# Patient Record
Sex: FEMALE | Race: WHITE | NOT HISPANIC OR LATINO | Employment: OTHER | URBAN - METROPOLITAN AREA
[De-identification: names, ages, dates, MRNs, and addresses within clinical notes are randomized per-mention and may not be internally consistent; named-entity substitution may affect disease eponyms.]

---

## 2017-01-11 ENCOUNTER — CARE COORDINATION (OUTPATIENT)
Dept: ANESTHESIOLOGY | Facility: CLINIC | Age: 47
End: 2017-01-11

## 2017-01-11 NOTE — PROGRESS NOTES
Called pt to notify her that Dr. Martinez had declined to fill out the disability forms that were faxed here. He stated that she should have them filled out by her PCP. Pt stated she was instructed to send them to all providers who are caring for her.    I told her I would all that company and notify them that he is declining. Pt then requested to be transferred to schedule a follow up appointment with Dr. Martinez.

## 2017-01-24 DIAGNOSIS — M96.1 POSTLAMINECTOMY SYNDROME OF LUMBAR REGION: Primary | ICD-10-CM

## 2017-01-24 RX ORDER — BUPRENORPHINE 10 UG/H
1 PATCH TRANSDERMAL
Qty: 4 PATCH | Refills: 0 | Status: SHIPPED | OUTPATIENT
Start: 2017-01-26 | End: 2017-02-20

## 2017-01-24 NOTE — TELEPHONE ENCOUNTER
Refill request    Medication: buprenorphine (BUTRANS) 10 MCG/HR WK patch      MNPMP Checked: Yes    buprenorphine (BUTRANS) 10 MCG/HR WK patch - Last refilled 12/29/16 for 4 patches    Refilled: YES, dated to be refilled 28 days since last refill    Last clinic appointment: 12/5/16  Next clinic appointment: 3/20/17    Patient requested to:     Sent to address on file    Left VM stating prescription was going in the mail today. Also sent [employer] message.

## 2017-02-20 DIAGNOSIS — M96.1 POSTLAMINECTOMY SYNDROME OF LUMBAR REGION: ICD-10-CM

## 2017-02-20 RX ORDER — BUPRENORPHINE 10 UG/H
1 PATCH TRANSDERMAL
Qty: 4 PATCH | Refills: 0 | Status: SHIPPED | OUTPATIENT
Start: 2017-02-23 | End: 2017-03-20

## 2017-02-20 NOTE — TELEPHONE ENCOUNTER
Refill request    Medication: buprenorphine (BUTRANS) 10 MCG/HR WK patch- Place 1 patch onto the skin every 7 days      MNPMP Checked:     Butrans - Last refilled 1/26/17 for 4 patches (28 day supply)    Refilled: YES, dated to be refilled 30 days since last refill    Last clinic appointment: 12/5/16 with Dr. Gilliam  Next clinic appointment: 03/20/17 with Dr. Martinez.     He would like her to follow up every month in the future.     Patient requested to:      Sent to address on file-   [address] [place] [address]

## 2017-02-23 ENCOUNTER — MYC REFILL (OUTPATIENT)
Dept: ANESTHESIOLOGY | Facility: CLINIC | Age: 47
End: 2017-02-23

## 2017-02-23 DIAGNOSIS — M96.1 POST LAMINECTOMY SYNDROME: ICD-10-CM

## 2017-02-24 NOTE — TELEPHONE ENCOUNTER
Message from MyChart:  Original authorizing provider: DO Ada Bauer would like a refill of the following medications:  tiZANidine (ZANAFLEX) 4 MG tablet [Higinio Gilliam DO]    Preferred pharmacy: [id number]  [place], [place] - [address] [place] [address]    Comment:

## 2017-03-20 ENCOUNTER — OFFICE VISIT (OUTPATIENT)
Dept: ANESTHESIOLOGY | Facility: CLINIC | Age: 47
End: 2017-03-20

## 2017-03-20 VITALS
DIASTOLIC BLOOD PRESSURE: 91 MMHG | HEART RATE: 106 BPM | HEIGHT: 67 IN | SYSTOLIC BLOOD PRESSURE: 140 MMHG | WEIGHT: 185 LBS | BODY MASS INDEX: 29.03 KG/M2 | OXYGEN SATURATION: 98 %

## 2017-03-20 DIAGNOSIS — M96.1 POST LAMINECTOMY SYNDROME: ICD-10-CM

## 2017-03-20 DIAGNOSIS — M96.1 POSTLAMINECTOMY SYNDROME OF LUMBAR REGION: ICD-10-CM

## 2017-03-20 RX ORDER — LIDOCAINE 50 MG/G
PATCH TOPICAL
Qty: 30 PATCH | Refills: 1 | Status: SHIPPED | OUTPATIENT
Start: 2017-03-20 | End: 2017-08-30

## 2017-03-20 RX ORDER — ONDANSETRON 4 MG/1
4 TABLET, ORALLY DISINTEGRATING ORAL EVERY 12 HOURS PRN
COMMUNITY
Start: 2017-03-07 | End: 2017-09-12

## 2017-03-20 RX ORDER — BUPRENORPHINE 10 UG/H
1 PATCH TRANSDERMAL
Qty: 4 PATCH | Refills: 0 | Status: SHIPPED | OUTPATIENT
Start: 2017-03-23 | End: 2017-04-17

## 2017-03-20 ASSESSMENT — ENCOUNTER SYMPTOMS
NECK MASS: 1
HYPERTENSION: 0
BRUISES/BLEEDS EASILY: 1
CLAUDICATION: 1
EYE REDNESS: 1
CONSTIPATION: 1
EYE PAIN: 0
BLOOD IN STOOL: 1
POLYDIPSIA: 1
EXERCISE INTOLERANCE: 1
NAUSEA: 1
TREMORS: 1
TINGLING: 1
BLOATING: 1
MYALGIAS: 1
JOINT SWELLING: 1
DIARRHEA: 0
ALTERED TEMPERATURE REGULATION: 0
DOUBLE VISION: 1
JAUNDICE: 0
MUSCLE WEAKNESS: 1
DIZZINESS: 1
LEG PAIN: 1
SLEEP DISTURBANCES DUE TO BREATHING: 0
FEVER: 0
ABDOMINAL PAIN: 1
WEIGHT GAIN: 0
POOR WOUND HEALING: 1
DIFFICULTY URINATING: 1
TROUBLE SWALLOWING: 1
NECK PAIN: 1
MUSCLE CRAMPS: 1
NUMBNESS: 1
DECREASED APPETITE: 1
CHILLS: 1
PALPITATIONS: 1
VOMITING: 1
SPEECH CHANGE: 0
WEAKNESS: 1
DISTURBANCES IN COORDINATION: 1
HEMATURIA: 0
NIGHT SWEATS: 0
SYNCOPE: 1
STIFFNESS: 1
BACK PAIN: 1
SEIZURES: 0
DYSURIA: 1
SINUS CONGESTION: 1
SORE THROAT: 0
ORTHOPNEA: 0
BOWEL INCONTINENCE: 0
RECTAL BLEEDING: 0
RECTAL PAIN: 0
NAIL CHANGES: 0
EYE IRRITATION: 1
LIGHT-HEADEDNESS: 1
HYPOTENSION: 0
INCREASED ENERGY: 0
LEG SWELLING: 1
HEARTBURN: 1
HOARSE VOICE: 1
HALLUCINATIONS: 0
FATIGUE: 1
HEADACHES: 1
WEIGHT LOSS: 0
LOSS OF CONSCIOUSNESS: 1
SKIN CHANGES: 0
SWOLLEN GLANDS: 0
FLANK PAIN: 0
TASTE DISTURBANCE: 0
EYE WATERING: 0
ARTHRALGIAS: 1
SMELL DISTURBANCE: 0
PARALYSIS: 0
TACHYCARDIA: 1
POLYPHAGIA: 0
MEMORY LOSS: 1
SINUS PAIN: 1

## 2017-03-20 ASSESSMENT — ANXIETY QUESTIONNAIRES
GAD7 TOTAL SCORE: 0
7. FEELING AFRAID AS IF SOMETHING AWFUL MIGHT HAPPEN: 0 = NOT AT ALL
GAD7 TOTAL SCORE: 0

## 2017-03-20 NOTE — NURSING NOTE
LPN reviewed AVS with Pt includin. Lidocaine patches.   On for 12 hours, off for 12 hours.   lidocaine (LIDODERM) 5 % Patch- Apply up to 3 patches to painful area at once for up to 12 h within a 24 h period.  Remove after 12 hours.    2. Butrans refill.       Follow up: 3 month  Pt verbalized an understanding of information, and was asked to contact clinic with questions.    Mya Cash LPN

## 2017-03-20 NOTE — MR AVS SNAPSHOT
After Visit Summary   3/20/2017    Ada Welch    MRN: [id number]           Patient Information     Date Of Birth          1970        Visit Information        Provider Department      3/20/2017 3:00 PM Faiza Martinez MD [facility]        Today's Diagnoses     Post laminectomy syndrome        Postlaminectomy syndrome of lumbar region          Care Instructions    1. Lidocaine patches.   On for 12 hours, off for 12 hours.   lidocaine (LIDODERM) 5 % Patch- Apply up to 3 patches to painful area at once for up to 12 h within a 24 h period.  Remove after 12 hours.    2. Butrans refill.       Follow up: 1 month      To speak with a nurse, schedule/reschedule/cancel a clinic appointment, or request a medication refill call: [phone]     You can also reach us by [employer]: [email or web address]    For refills, please call on Monday, 1 week before your medication runs out. The doctors are not always in clinic, so this gives us time to get your prescriptions ready.  Please let us know the name of the medication you are requesting a refill of.                                   Follow-ups after your visit        Who to contact     Please call your clinic at [phone] to:    Ask questions about your health    Make or cancel appointments    Discuss your medicines    Learn about your test results    Speak to your doctor   If you have compliments or concerns about an experience at your clinic, or if you wish to file a complaint, please contact [facility] Patient Relations at [phone] or email us at [email or web address]         Additional Information About Your Visit        MyChart Information     [employer] gives you secure access to your electronic health record. If you see a primary care provider, you can also send messages to your care team and make appointments. If you have questions, please call your  "primary care clinic.  If you do not have a primary care provider, please call [phone] and they will assist you.      [employer] is an electronic gateway that provides easy, online access to your medical records. With [employer], you can request a clinic appointment, read your test results, renew a prescription or communicate with your care team.     To access your existing account, please contact your [facility] or call [phone] for assistance.        Care EveryWhere ID     This is your Care EveryWhere ID. This could be used by other organizations to access your [place] medical records  [id number]        Your Vitals Were     Pulse Height Last Period Pulse Oximetry Breastfeeding? BMI (Body Mass Index)    106 1.702 m (5' 7\") 03/12/2017 98% No 28.98 kg/m2       Blood Pressure from Last 3 Encounters:   03/20/17 (!) 140/91   12/05/16 (!) 133/91   11/02/16 129/85    Weight from Last 3 Encounters:   03/20/17 83.9 kg (185 lb)   12/05/16 86.2 kg (190 lb)   11/02/16 88.5 kg (195 lb)              Today, you had the following     No orders found for display         Today's Medication Changes          These changes are accurate as of: 3/20/17  4:49 PM.  If you have any questions, ask your nurse or doctor.               Start taking these medicines.        Dose/Directions    lidocaine 5 % Patch   Commonly known as:  LIDODERM   Used for:  Post laminectomy syndrome   Started by:  Faiza Martinez MD        Apply up to 3 patches to painful area at once for up to 12 h within a 24 h period.  Remove after 12 hours.   Quantity:  30 patch   Refills:  1         Stop taking these medicines if you haven't already. Please contact your care team if you have questions.     albuterol 108 (90 BASE) MCG/ACT Inhaler   Generic drug:  albuterol   Stopped by:  Faiza Martinez MD           AMOXICILLIN PO   Stopped by:  Faiza Martinez MD           CITALOPRAM HYDROBROMIDE PO   Stopped by:  Faiza Martinez MD        "    FUROSEMIDE PO   Stopped by:  Faiza Martinez MD           Melatonin Gummies 2.5 MG Chew   Stopped by:  Faiza Martinez MD           MELOXICAM PO   Stopped by:  Faiza Martinez MD                Where to get your medicines      These medications were sent to [id number]  [place], [place] - [address]  [address] [facility], [place] [address]     Phone:  [phone]     lidocaine 5 % Patch         Some of these will need a paper prescription and others can be bought over the counter.  Ask your nurse if you have questions.     Bring a paper prescription for each of these medications     buprenorphine 10 MCG/HR WK patch                Primary Care Provider Office Phone # Fax #    Rigo Dotson [phone]       [facility]  [address] [address]        Thank you!     Thank you for choosing [facility]  for your care. Our goal is always to provide you with excellent care. Hearing back from our patients is one way we can continue to improve our services. Please take a few minutes to complete the written survey that you may receive in the mail after your visit with us. Thank you!             Your Updated Medication List - Protect others around you: Learn how to safely use, store and throw away your medicines at www.disposemymeds.org.          This list is accurate as of: 3/20/17  4:49 PM.  Always use your most recent med list.                   Brand Name Dispense Instructions for use    ADVAIR DISKUS 250-50 MCG/DOSE diskus inhaler   Generic drug:  fluticasone-salmeterol          buprenorphine 10 MCG/HR WK patch   Start taking on:  3/23/2017    BUTRANS    4 patch    Place 1 patch onto the skin every 7 days       cetirizine 10 MG tablet    zyrTEC         FLUTICASONE FUROATE NA      Spray 50 mcg in nostril At Bedtime       levothyroxine 25 mcg/mL Susp    SYNTHROID     25 mcg every morning       lidocaine 5 % Patch    LIDODERM     30 patch    Apply up to 3 patches to painful area at once for up to 12 h within a 24 h period.  Remove after 12 hours.       MULTIVITAMIN PO      Take by mouth daily       nabumetone 750 MG tablet    RELAFEN     750 mg 2 times daily       omeprazole 20 MG CR capsule    priLOSEC     20 mg 2 times daily       ondansetron 4 MG ODT tab    ZOFRAN-ODT     4 mg every 12 hours as needed       polyethylene glycol powder    MIRALAX/GLYCOLAX         PROBIOTIC DAILY PO      Take by mouth 2 times daily       tiZANidine 4 MG tablet    ZANAFLEX    90 tablet    Take 2 tablets (8 mg) by mouth 3 times daily as needed for muscle spasms

## 2017-03-20 NOTE — PATIENT INSTRUCTIONS
1. Lidocaine patches.   On for 12 hours, off for 12 hours.   lidocaine (LIDODERM) 5 % Patch- Apply up to 3 patches to painful area at once for up to 12 h within a 24 h period.  Remove after 12 hours.    2. Butrans refill.       Follow up: 3 month      To speak with a nurse, schedule/reschedule/cancel a clinic appointment, or request a medication refill call: [phone]     You can also reach us by [employer]: [email or web address]    For refills, please call on Monday, 1 week before your medication runs out. The doctors are not always in clinic, so this gives us time to get your prescriptions ready.  Please let us know the name of the medication you are requesting a refill of.

## 2017-03-20 NOTE — LETTER
3/20/2017       RE: Ada Welch  [address] [place] [address]     Dear Colleague,    Thank you for referring your patient, Ada Welch, to the [facility] at [facility]. Please see a copy of my visit note below.    Physician Attestation   I, Faiza Martinez, saw and evaluated Ada Welch as part of a shared visit.  I have reviewed and discussed with the advanced practice provider their history, physical and plan.    I personally reviewed the vital signs, medications, labs and imaging.    My key history or physical exam findings: Ada Welch is a 45 year old female with past medical history significant for Ehler Danlos syndrome presents with low back pain, which is mostly located at lower lumbar area. the patient has very complex history of low back pain for nearly 24 years for which she underwent L4-S1 fusion (TLIF) 12/09/2013.  Postoperatively her  back pain even became much worse than preop.  Mainly LBP, but also radiates down both legs S1 dermatome distribution.   She then had  removal of posterior instrumentation (screws and guadalupe) with exploration of fusion.  Fusion noted to already be solid.  NO significant relief afforded by this surgery. The pain has been progressively worsened, limiting the patient s activities, affecting mood and sleep quality, and causing a decrease in the patient s quality of life. She is currently on chronic pain meds: Dilaudid 4 mg, 3 tabs/day which are not doing much to relief her symptoms.  She underwent multiple SUNSHINE's which were not helpful, and even made things worse.  Recently started aquatic based physical therapy. Previous EMG showed some L5 radiculopathy on left. Her pain significantly improved after we started her butrans patch. She continues to be more functional than before the butrans was prescribed. She is able to walk longer distances and do activities with less pain that  before. She has occasional weakness when she is walking but denies constant, focal weakness. She has numbness in her left 4th toe that has been constant since her surgery. She denies any significant side effects from the medications. She also denies any new bowel or bladder issues, new weakness or numbness.    Key management decisions made by me: 1. Medications.      -Butrans patch 10 mcg/hr every week.  -Tizanidine 8 mg po q8 hours.  -Apply Lidocaine Patch 5% to intact skin to cover the most painful area. Apply the prescribed number of patches (maximum of 3), only once for up to 12 hours.   -Continue aquatic based physical therapy. The patient is encouraged to stay active and to perform exercise as tolerated.   -Opioid Safety. The patient received full information regarding chronic pain management guidelines, appropriate opioid and adjuvant pain medication usage, potential side effects, adverse reactions, and risks of possible withdrawal and dependency. The patient has signed opioid contract and agreed to follow the established guidelines and the goals of treatment plan.  -f/u 3 months.            Faiza Martinez  Date of Service (when I saw the patient): 03/20/17    Date of visit: 03/20/17     Chief complaint:   Chief Complaint   Patient presents with     Pain Management     Return visit- Follow up appointment related to        Interval history:  Ada Welch is a 46 year old female last seen by Dr. Gilliam on 12/20/16 for her chronic low back pain s/p L4-S1 fusion in the setting of [facility].        Since her last visit, Ada Welch reports:    She continues to be more functional than before the butrans was prescribed. She is able to walk longer distances and do activities with less pain that before. She has occasional weakness when she is walking but denies constant, focal weakness. She has numbness in her left 4th toe that has been constant since her surgery. She denies any significant side  "effects from the medications. She also denies any new bowel or bladder issues, new weakness or numbness.    Pain scores:  Pain intensity on average is 6 on a scale of 0-10 which is tolerable for her.     Current pain treatments:   Buprenorphine Transdermal 10mcg/hr q7days - very effective  Tizanidine 4mg TID - mildly effective  Ibuprofen  Acetaminophen    Past pain treatments:  Methadone  Fentanyl Patch  Hydromorphone  Lyrica  Meloxciam    Side Effects: no side effect    Medications:  Current Outpatient Prescriptions   Medication Sig Dispense Refill     tiZANidine (ZANAFLEX) 4 MG tablet Take 2 tablets (8 mg) by mouth 3 times daily as needed for muscle spasms 90 tablet 3     buprenorphine (BUTRANS) 10 MCG/HR WK patch Place 1 patch onto the skin every 7 days 4 patch 0     AMOXICILLIN PO Take by mouth 2 times daily Pt taking BID for 10 days.       MELOXICAM PO Take 15 mg by mouth daily       fluticasone-salmeterol (ADVAIR DISKUS) 250-50 MCG/DOSE diskus inhaler        cetirizine (ZYRTEC) 10 MG tablet        Melatonin Gummies 2.5 MG CHEW        polyethylene glycol (MIRALAX/GLYCOLAX) powder        albuterol (ALBUTEROL) 108 (90 BASE) MCG/ACT inhaler        CITALOPRAM HYDROBROMIDE PO Take 4 mg by mouth At Bedtime       FLUTICASONE FUROATE NA Spray 50 mcg in nostril At Bedtime       Multiple Vitamins-Minerals (MULTIVITAMIN PO) Take by mouth daily       Probiotic Product (PROBIOTIC DAILY PO) Take by mouth 2 times daily       FUROSEMIDE PO Take 40 mg by mouth daily         Medical History: any changes in medical history since they were last seen? No    Review of Systems:  The 14 system ROS was reviewed from the intake questionnaire, and is positive for: back pain, myalgias, depression, anxiety  Any bowel or bladder problems: No  Mood: stable    Physical Exam:  Blood pressure (!) 140/91, pulse 106, height 1.702 m (5' 7\"), weight 83.9 kg (185 lb), last menstrual period 03/12/2017, SpO2 98 %, not currently " breastfeeding.  General: NAD  Respiratory: breathing without difficulties  Gait:    Antalgic  MSK exam: moving all extremities.    Neuro: 5/5 in b/l LE throughout, 2/4 b/l patellar reflexes  Skin: no rashes or lesions noted on visible areas     MRI of the Lumbar Spine without contrast     History: Chronic pain syndrome, Arthrodesis status, Tom-Danlos  syndrome.  Comparison: 7/12/2016 and plain films from 10/13/2015, and CT  myelogram 2/18/2015, all from the outside hospital.     Technique: Sagittal T1-weighted, T2-weighted, STIR, and axial  T2-weighted spin echo and gradient echo images of the lumbar spine  were obtained without intravenous contrast.     Findings: There are 5 lumbar-type vertebrae assumed for the purposes  of this dictation.  The tip of the conus medullaris is at  L1.  The  prior plain films from October 2015 demonstrate anterior  intervertebral fusion devices at L4-5 and L5-S1, with normal-appearing  anatomic alignment, and likewise alignment appears as expected on this  examination.  There are postsurgical findings in the soft tissues of  L4-5 and L5-S1 slightly bright STIR and T2 signal, unchanged, likely  related to granulation tissue. There also appear to be  hemilaminectomies on the left at the levels of L4 and L5, better  visualized on the outside examination CT myelogram from 2/18/2015.  There is no significant disc height narrowing above the level of L4.    Regarding bone marrow signal intensity, there is a T1 hypointense  focus within L2 about 1 cm size there is bright on T2 and STIR images  and appear stable; on prior MRI that utilized intravenous contrast,  this focus did not enhance and had slightly increased surrounding  signal T1, suggesting a benign hemangioma with atypical appearance.  Similarly, T1 bright benign hemangiomas are visualized in L1 and T12,  both being less than 1 cm size.     On a level by level basis:     L1-2: No significant foraminal or spinal canal  "stenosis.     L2-3: No significant spinal canal or foraminal narrowing.     L3-4: Mild posterior bulge and mild to moderate bilateral facet  arthropathy and ligamentum flavum thickening, without spinal canal or  foraminal narrowing.     L4-5: No spinal canal or foraminal stenosis although there is moderate  degree of hypertrophy posteriorly bilaterally of the facets; this also  could be related to facet fusion.     L5-S1: No significant spinal canal or foraminal narrowing.     The visualized paraspinous tissues anteriorly are unremarkable.                                                                       Impression: Anterior and posterior fusion of L4-L5 and L5-S1, as well  as hemilaminectomies of L4 and L5, with preserved alignment. No  significant spinal canal or foraminal stenoses.       Assessment:   1.) Chronic low back pain  2.) Post Laminectomy Syndrome  3.) Bilateral Lumbar Radiculitis  4.) Tom-Danlos Syndrome  5.) Chronic, continuous use of Opioids  6.) Migraines, ?Greater occipital neuralgia    Ada Welch is a 46 year old female who is seen at the pain clinic for follow up for her chronic pain.    Plan:  1. Interventions: None at this time.  SUNSHINE's and SCS and other options have been discussed with her but this would be a last resort as she feels her body \"rejects foreign objects\".    2. Medications: Continue buprenorphine as prescribed, she is getting excellent benefit at this time and is on a stable dose.  Continue tizanidine 8mg to help with her muscle spasms.  Continue ibuprofen and ACETAMINOPHEN prn.  3. Physical Therapy:  Continue as scheduled - she also does aquatic therapy - she will continue her HEP  4. Psychology/Coping skills: continue therapy in [place]  5. Diagnostic Studies:  None today  6. Follow up: 3 months with Dr. Martinez    Total time spent was 30 minutes, and more than 50% of face to face time was spent in counseling and/or coordination of care regarding her pain management " plan.    Jolanta Martines DO  Pain Medicine Fellow    Patient seen with Dr. Martinez, staff.

## 2017-03-20 NOTE — PROGRESS NOTES
Date of visit: 03/20/17     Chief complaint:   Chief Complaint   Patient presents with     Pain Management     Return visit- Follow up appointment related to        Interval history:  Ada Welch is a 46 year old female last seen by Dr. Gilliam on 12/20/16 for her chronic low back pain s/p L4-S1 fusion in the setting of Pako Zuniga.        Since her last visit, Ada Welch reports:    She continues to be more functional than before the butrans was prescribed. She is able to walk longer distances and do activities with less pain that before. She has occasional weakness when she is walking but denies constant, focal weakness. She has numbness in her left 4th toe that has been constant since her surgery. She denies any significant side effects from the medications. She also denies any new bowel or bladder issues, new weakness or numbness.    Pain scores:  Pain intensity on average is 6 on a scale of 0-10 which is tolerable for her.     Current pain treatments:   Buprenorphine Transdermal 10mcg/hr q7days - very effective  Tizanidine 4mg TID - mildly effective  Ibuprofen  Acetaminophen    Past pain treatments:  Methadone  Fentanyl Patch  Hydromorphone  Lyrica  Meloxciam    Side Effects: no side effect    Medications:  Current Outpatient Prescriptions   Medication Sig Dispense Refill     tiZANidine (ZANAFLEX) 4 MG tablet Take 2 tablets (8 mg) by mouth 3 times daily as needed for muscle spasms 90 tablet 3     buprenorphine (BUTRANS) 10 MCG/HR WK patch Place 1 patch onto the skin every 7 days 4 patch 0     AMOXICILLIN PO Take by mouth 2 times daily Pt taking BID for 10 days.       MELOXICAM PO Take 15 mg by mouth daily       fluticasone-salmeterol (ADVAIR DISKUS) 250-50 MCG/DOSE diskus inhaler        cetirizine (ZYRTEC) 10 MG tablet        Melatonin Gummies 2.5 MG CHEW        polyethylene glycol (MIRALAX/GLYCOLAX) powder        albuterol (ALBUTEROL) 108 (90 BASE) MCG/ACT inhaler        CITALOPRAM HYDROBROMIDE  "PO Take 4 mg by mouth At Bedtime       FLUTICASONE FUROATE NA Spray 50 mcg in nostril At Bedtime       Multiple Vitamins-Minerals (MULTIVITAMIN PO) Take by mouth daily       Probiotic Product (PROBIOTIC DAILY PO) Take by mouth 2 times daily       FUROSEMIDE PO Take 40 mg by mouth daily         Medical History: any changes in medical history since they were last seen? No    Review of Systems:  The 14 system ROS was reviewed from the intake questionnaire, and is positive for: back pain, myalgias, depression, anxiety  Any bowel or bladder problems: No  Mood: stable    Physical Exam:  Blood pressure (!) 140/91, pulse 106, height 1.702 m (5' 7\"), weight 83.9 kg (185 lb), last menstrual period 03/12/2017, SpO2 98 %, not currently breastfeeding.  General: NAD  Respiratory: breathing without difficulties  Gait:    Antalgic  MSK exam: moving all extremities.    Neuro: 5/5 in b/l LE throughout, 2/4 b/l patellar reflexes  Skin: no rashes or lesions noted on visible areas     MRI of the Lumbar Spine without contrast     History: Chronic pain syndrome, Arthrodesis status, Tom-Danlos  syndrome.  Comparison: 7/12/2016 and plain films from 10/13/2015, and CT  myelogram 2/18/2015, all from the outside hospital.     Technique: Sagittal T1-weighted, T2-weighted, STIR, and axial  T2-weighted spin echo and gradient echo images of the lumbar spine  were obtained without intravenous contrast.     Findings: There are 5 lumbar-type vertebrae assumed for the purposes  of this dictation.  The tip of the conus medullaris is at  L1.  The  prior plain films from October 2015 demonstrate anterior  intervertebral fusion devices at L4-5 and L5-S1, with normal-appearing  anatomic alignment, and likewise alignment appears as expected on this  examination.  There are postsurgical findings in the soft tissues of  L4-5 and L5-S1 slightly bright STIR and T2 signal, unchanged, likely  related to granulation tissue. There also appear to " be  hemilaminectomies on the left at the levels of L4 and L5, better  visualized on the outside examination CT myelogram from 2/18/2015.  There is no significant disc height narrowing above the level of L4.    Regarding bone marrow signal intensity, there is a T1 hypointense  focus within L2 about 1 cm size there is bright on T2 and STIR images  and appear stable; on prior MRI that utilized intravenous contrast,  this focus did not enhance and had slightly increased surrounding  signal T1, suggesting a benign hemangioma with atypical appearance.  Similarly, T1 bright benign hemangiomas are visualized in L1 and T12,  both being less than 1 cm size.     On a level by level basis:     L1-2: No significant foraminal or spinal canal stenosis.     L2-3: No significant spinal canal or foraminal narrowing.     L3-4: Mild posterior bulge and mild to moderate bilateral facet  arthropathy and ligamentum flavum thickening, without spinal canal or  foraminal narrowing.     L4-5: No spinal canal or foraminal stenosis although there is moderate  degree of hypertrophy posteriorly bilaterally of the facets; this also  could be related to facet fusion.     L5-S1: No significant spinal canal or foraminal narrowing.     The visualized paraspinous tissues anteriorly are unremarkable.                                                                       Impression: Anterior and posterior fusion of L4-L5 and L5-S1, as well  as hemilaminectomies of L4 and L5, with preserved alignment. No  significant spinal canal or foraminal stenoses.       Assessment:   1.) Chronic low back pain  2.) Post Laminectomy Syndrome  3.) Bilateral Lumbar Radiculitis  4.) Tom-Danlos Syndrome  5.) Chronic, continuous use of Opioids  6.) Migraines, ?Greater occipital neuralgia    Ada Welch is a 46 year old female who is seen at the pain clinic for follow up for her chronic pain.    Plan:  1. Interventions: None at this time.  SUNSHINE's and SCS and other  "options have been discussed with her but this would be a last resort as she feels her body \"rejects foreign objects\".    2. Medications: Continue buprenorphine as prescribed, she is getting excellent benefit at this time and is on a stable dose.  Continue tizanidine 8mg to help with her muscle spasms.  Continue ibuprofen and ACETAMINOPHEN prn.  3. Physical Therapy:  Continue as scheduled - she also does aquatic therapy - she will continue her HEP  4. Psychology/Coping skills: continue therapy in [place]  5. Diagnostic Studies:  None today  6. Follow up: 3 months with Dr. Martinez    Total time spent was 30 minutes, and more than 50% of face to face time was spent in counseling and/or coordination of care regarding her pain management plan.    Jolanta Martines DO  Pain Medicine Fellow    Patient seen with Dr. Martinez, staff.       "

## 2017-03-21 ASSESSMENT — ANXIETY QUESTIONNAIRES: GAD7 TOTAL SCORE: 0

## 2017-03-21 NOTE — PROGRESS NOTES
Physician Attestation   I, Faiza Martinez, saw and evaluated Ada Welch as part of a shared visit.  I have reviewed and discussed with the advanced practice provider their history, physical and plan.    I personally reviewed the vital signs, medications, labs and imaging.    My key history or physical exam findings: Ada Welch is a 45 year old female with past medical history significant for Ehler Danlos syndrome presents with low back pain, which is mostly located at lower lumbar area. the patient has very complex history of low back pain for nearly 24 years for which she underwent L4-S1 fusion (TLIF) 12/09/2013.  Postoperatively her  back pain even became much worse than preop.  Mainly LBP, but also radiates down both legs S1 dermatome distribution.   She then had  removal of posterior instrumentation (screws and guadalupe) with exploration of fusion.  Fusion noted to already be solid.  NO significant relief afforded by this surgery. The pain has been progressively worsened, limiting the patient s activities, affecting mood and sleep quality, and causing a decrease in the patient s quality of life. She is currently on chronic pain meds: Dilaudid 4 mg, 3 tabs/day which are not doing much to relief her symptoms.  She underwent multiple SUNSHINE's which were not helpful, and even made things worse.  Recently started aquatic based physical therapy. Previous EMG showed some L5 radiculopathy on left. Her pain significantly improved after we started her butrans patch. She continues to be more functional than before the butrans was prescribed. She is able to walk longer distances and do activities with less pain that before. She has occasional weakness when she is walking but denies constant, focal weakness. She has numbness in her left 4th toe that has been constant since her surgery. She denies any significant side effects from the medications. She also denies any new bowel or bladder issues, new weakness or  numbness.    Key management decisions made by me: 1. Medications.      -Butrans patch 10 mcg/hr every week.  -Tizanidine 8 mg po q8 hours.  -Apply Lidocaine Patch 5% to intact skin to cover the most painful area. Apply the prescribed number of patches (maximum of 3), only once for up to 12 hours.   -Continue aquatic based physical therapy. The patient is encouraged to stay active and to perform exercise as tolerated.   -Opioid Safety. The patient received full information regarding chronic pain management guidelines, appropriate opioid and adjuvant pain medication usage, potential side effects, adverse reactions, and risks of possible withdrawal and dependency. The patient has signed opioid contract and agreed to follow the established guidelines and the goals of treatment plan.  -f/u 3 months.            Faiza Martinez  Date of Service (when I saw the patient): 03/20/17

## 2017-03-23 ENCOUNTER — TELEPHONE (OUTPATIENT)
Dept: ANESTHESIOLOGY | Facility: CLINIC | Age: 47
End: 2017-03-23

## 2017-03-23 NOTE — TELEPHONE ENCOUNTER
Prior Authorization Retail Medication Request  Medication/Dose: lidocaine (LIDODERM) 5 % Patch  Diagnosis and ICD code:   New/Renewal/Insurance Change PA:   Previously Tried and Failed Therapies:     Insurance ID (if provided):   Insurance Phone (if provided):     Any additional info from fax request:     If you received a fax notification from an outside Pharmacy:  Pharmacy Name:  Pharmacy #:  Pharmacy Fax:

## 2017-04-04 ENCOUNTER — TRANSFERRED RECORDS (OUTPATIENT)
Dept: HEALTH INFORMATION MANAGEMENT | Facility: CLINIC | Age: 47
End: 2017-04-04

## 2017-04-13 NOTE — TELEPHONE ENCOUNTER
[facility] Prior Authorization Team   Phone: [phone]  Fax: [phone]      PA Initiation    Medication: lidocaine (LIDODERM) 5 % Patch  Insurance Company: [employer] [place] - Phone [phone] Fax [phone]  Pharmacy Filling the Rx: [id number] - [place] - [address] [place] [address]  Filling Pharmacy Phone: [phone]  Filling Pharmacy Fax:    Start Date: 4/13/2017

## 2017-04-17 DIAGNOSIS — M96.1 POSTLAMINECTOMY SYNDROME OF LUMBAR REGION: ICD-10-CM

## 2017-04-17 RX ORDER — BUPRENORPHINE 10 UG/H
1 PATCH TRANSDERMAL
Qty: 4 PATCH | Refills: 0 | Status: SHIPPED | OUTPATIENT
Start: 2017-04-23 | End: 2017-05-17

## 2017-04-17 NOTE — TELEPHONE ENCOUNTER
Refill request    Medication: buprenorphine (BUTRANS) 10 MCG/HR WK patch-   Place 1 patch onto the skin every 7 days, Disp-4 patch    MNPMP Checked:     Butrans - Last refilled 3/23/17 for patches    Refilled: YES, dated to be refilled 28 days since last refill (4/20/17)    Last clinic appointment: 3/20/17  Next clinic appointment: Pt stated they will call and schedule after they have a date on when they are going to be moving.     Patient requested to:      Sent to pharmacy  [id number]  [place], [place] - [address] [place] [address] [phone] (Phone)  [phone] (Fax)

## 2017-04-19 NOTE — TELEPHONE ENCOUNTER
PRIOR AUTHORIZATION DENIED    Medication: lidocaine (LIDODERM) 5 % Patch - Denied    Denial Date: 4/14/2017    Denial Rational: : Program Criteria Not Met. The approved conditions are: (1) Pain due to post-herpetic neuralgia (a type of nerve pain); (2) Nerve pain due to cancer; or (3) another condition that is approved by the FDA for use of this drug.  If you feel there is additional information related to this case that might affect this decision and an appeal is desired, please submit a written letter of medical necessity to our PA Team.        Appeal Information:

## 2017-05-08 ENCOUNTER — TRANSFERRED RECORDS (OUTPATIENT)
Dept: HEALTH INFORMATION MANAGEMENT | Facility: CLINIC | Age: 47
End: 2017-05-08

## 2017-05-14 ENCOUNTER — TRANSFERRED RECORDS (OUTPATIENT)
Dept: HEALTH INFORMATION MANAGEMENT | Facility: CLINIC | Age: 47
End: 2017-05-14

## 2017-05-17 DIAGNOSIS — M96.1 POSTLAMINECTOMY SYNDROME OF LUMBAR REGION: ICD-10-CM

## 2017-05-17 RX ORDER — BUPRENORPHINE 10 UG/H
1 PATCH TRANSDERMAL
Qty: 4 PATCH | Refills: 0 | Status: SHIPPED | OUTPATIENT
Start: 2017-05-18 | End: 2017-06-13

## 2017-06-09 ENCOUNTER — TELEPHONE (OUTPATIENT)
Dept: ANESTHESIOLOGY | Facility: CLINIC | Age: 47
End: 2017-06-09

## 2017-06-09 DIAGNOSIS — M96.1 POST LAMINECTOMY SYNDROME: ICD-10-CM

## 2017-06-09 ASSESSMENT — ENCOUNTER SYMPTOMS
TROUBLE SWALLOWING: 1
SHORTNESS OF BREATH: 0
TINGLING: 1
SNORES LOUDLY: 1
HEADACHES: 1
BRUISES/BLEEDS EASILY: 1
DOUBLE VISION: 1
SYNCOPE: 0
SEIZURES: 0
WEIGHT GAIN: 0
TASTE DISTURBANCE: 0
RECTAL BLEEDING: 1
JOINT SWELLING: 1
DIFFICULTY URINATING: 1
ORTHOPNEA: 0
CHILLS: 1
SINUS PAIN: 1
INCREASED ENERGY: 0
LEG PAIN: 1
PALPITATIONS: 1
BACK PAIN: 1
NECK MASS: 1
ARTHRALGIAS: 1
POLYPHAGIA: 0
EXERCISE INTOLERANCE: 0
SPEECH CHANGE: 0
EYE PAIN: 0
NUMBNESS: 1
SKIN CHANGES: 0
NAIL CHANGES: 0
COUGH: 0
HEMATURIA: 0
TACHYCARDIA: 1
NAUSEA: 1
LEG SWELLING: 1
DIARRHEA: 1
MUSCLE WEAKNESS: 1
STIFFNESS: 1
HOARSE VOICE: 1
MYALGIAS: 1
EYE WATERING: 1
LIGHT-HEADEDNESS: 1
ALTERED TEMPERATURE REGULATION: 1
WEIGHT LOSS: 0
BLOATING: 1
HEARTBURN: 0
SPUTUM PRODUCTION: 1
WEAKNESS: 1
ABDOMINAL PAIN: 1
NIGHT SWEATS: 0
POLYDIPSIA: 1
BLOOD IN STOOL: 1
MUSCLE CRAMPS: 1
PARALYSIS: 0
FLANK PAIN: 1
RESPIRATORY PAIN: 0
DYSPNEA ON EXERTION: 0
TREMORS: 0
DISTURBANCES IN COORDINATION: 1
DIZZINESS: 1
HEMOPTYSIS: 0
EYE IRRITATION: 1
HYPERTENSION: 0
HALLUCINATIONS: 0
SLEEP DISTURBANCES DUE TO BREATHING: 0
BOWEL INCONTINENCE: 0
POOR WOUND HEALING: 1
RECTAL PAIN: 0
POSTURAL DYSPNEA: 0
DECREASED APPETITE: 0
DYSURIA: 0
HYPOTENSION: 1
SMELL DISTURBANCE: 0
CLAUDICATION: 0
FATIGUE: 1
FEVER: 0
JAUNDICE: 0
LOSS OF CONSCIOUSNESS: 0
MEMORY LOSS: 0
COUGH DISTURBING SLEEP: 0
SINUS CONGESTION: 1
CONSTIPATION: 1
SORE THROAT: 0
WHEEZING: 1
VOMITING: 0
EYE REDNESS: 1
SWOLLEN GLANDS: 1
NECK PAIN: 1

## 2017-06-09 ASSESSMENT — ANXIETY QUESTIONNAIRES
6. BECOMING EASILY ANNOYED OR IRRITABLE: NOT AT ALL
7. FEELING AFRAID AS IF SOMETHING AWFUL MIGHT HAPPEN: NOT AT ALL
2. NOT BEING ABLE TO STOP OR CONTROL WORRYING: NOT AT ALL
4. TROUBLE RELAXING: NOT AT ALL
5. BEING SO RESTLESS THAT IT IS HARD TO SIT STILL: NOT AT ALL
GAD7 TOTAL SCORE: 0
1. FEELING NERVOUS, ANXIOUS, OR ON EDGE: NOT AT ALL
3. WORRYING TOO MUCH ABOUT DIFFERENT THINGS: NOT AT ALL
GAD7 TOTAL SCORE: 0
GAD7 TOTAL SCORE: 0
7. FEELING AFRAID AS IF SOMETHING AWFUL MIGHT HAPPEN: NOT AT ALL

## 2017-06-09 NOTE — TELEPHONE ENCOUNTER
Patient's New pharmacy sent fax to have prescription renewed.Precrition renewed and  Pharmacy information updated in the patients chart.

## 2017-06-10 ASSESSMENT — ANXIETY QUESTIONNAIRES: GAD7 TOTAL SCORE: 0

## 2017-06-12 ENCOUNTER — OFFICE VISIT (OUTPATIENT)
Dept: ANESTHESIOLOGY | Facility: CLINIC | Age: 47
End: 2017-06-12

## 2017-06-12 ENCOUNTER — MYC MEDICAL ADVICE (OUTPATIENT)
Dept: ANESTHESIOLOGY | Facility: CLINIC | Age: 47
End: 2017-06-12

## 2017-06-12 DIAGNOSIS — M96.1 POSTLAMINECTOMY SYNDROME OF LUMBOSACRAL REGION: Primary | ICD-10-CM

## 2017-06-12 DIAGNOSIS — M96.1 POSTLAMINECTOMY SYNDROME OF LUMBAR REGION: ICD-10-CM

## 2017-06-12 ASSESSMENT — PAIN SCALES - GENERAL: PAINLEVEL: SEVERE PAIN (6)

## 2017-06-12 NOTE — LETTER
6/12/2017       RE: Ada Welch  [address] [facility] [address]     Dear Colleague,    Thank you for referring your patient, Ada Welch, to the [facility]NAGEMENT at [facility]. Please see a copy of my visit note below.    [facility] for Comprehensive Chronic Pain Management : Progress Note    Date of visit: 6/12/2017    Interval history:  Ada Welch is a 45 year old female with past medical history significant for Ehler Danlos syndrome is known to me for chronic  low back pain, which is mostly located at lower lumbar area. The patient has very complex history of low back pain for nearly 24 years for which she underwent L4-S1 fusion (TLIF) 12/09/2013.  Postoperatively her  back pain even became much worse than preop.  Mainly LBP, but also radiates down both legs S1 dermatome distribution.   She then had  removal of posterior instrumentation (screws and guadalupe) with exploration of fusion.  Fusion noted to already be solid.  NO significant relief afforded by this surgery. The pain has been progressively worsened, limiting the patient s activities, affecting mood and sleep quality, and causing a decrease in the patient s quality of life. She is currently on chronic pain meds: Dilaudid 4 mg, 3 tabs/day which are not doing much to relief her symptoms.  She underwent multiple SUNSHINE's which were not helpful, and even made things worse.  Recently started aquatic based physical therapy. Previous EMG showed some L5 radiculopathy on left. Her pain significantly improved after we started her butrans patch. She continues to be more functional than before the butrans was prescribed. She is able to walk longer distances and do activities with less pain that before. She has occasional weakness when she is walking but denies constant, focal weakness. She has numbness in her left 4th toe that has been constant since  her surgery. She denies any significant side effects from the medications. She also denies any new bowel or bladder issues, new weakness or numbness.     Recommendations/plan at the last visit included:  - Butrans patch 10 mcg/hr every week.  - Tizanidine 8 mg po q8 hours.  - Apply Lidocaine Patch 5% to intact skin to cover the most painful area. Apply the prescribed number of patches (maximum of 3), only once for up to 12 hours.   -Continue aquatic based physical therapy. The patient is encouraged to stay active and to perform exercise as tolerated.   -Opioid Safety. The patient received full information regarding chronic pain management guidelines, appropriate opioid and adjuvant pain medication usage, potential side effects, adverse reactions, and risks of possible withdrawal and dependency. The patient has signed opioid contract and agreed to follow the established guidelines and the goals of treatment plan.  -f/u 3 months.     Since the last visit, Adageovanna Welch reports:  - chronic low back pain: pain much improved although she occasionally reports breakthrough pain  - medication response: The patient had been taking butrans. The patient states that the medications are helpful. Her insurance didn't approve lidoderm patches, she has been using lidocaine cream, which has been helpful. In addition, she is using tizanidine/      Pain scores:  Pain intensity on average is 6 on a scale of 0-10 which is tolerable for her.      Current pain treatments:   Buprenorphine Transdermal 10mcg/hr q7days - very effective  Tizanidine 4mg TID - mildly effective  Ibuprofen  Acetaminophen     Past pain treatments:  Methadone  Fentanyl Patch  Hydromorphone  Lyrica  Meloxciam    Physical Exam:  General: Awake in no apparent distress. This patient is accompanied in the office by patient's  and kids.  Eyes: Sclerae are anicteric. PERRLA, EOMI   Neck: supple, no masses.   Lungs: unlabored.   Heart: regular rate and rhythm    Abdomen: soft non tender.  Extremities: Pulses are well palpable, no peripheral edema.   Musculoskeletal: All muscle groups are normal in bulk and tone. The patient changes position without pain behavior. The patient walks with a normal gait. Posture is normal. Muscle strength was rated at 5/5 in all groups in the extremities. Examination of the joints reveals preserved range of motion.  Neurologic exam:Strength 5/5 for bilateral grasp, finger abduction, wrist extension, elbow flexion, elbow extension, shoulder abduction.  Strength 5/5 for bilateral dorsiflexion, plantarflexion, great toe extension, knee extension, hip flexion. Sensation intact throughout all dermatomes bilateral upper extremities and lower extremities  Psychiatric; Normal affect.   Skin: Warm and Dry.    Medications:  Current Outpatient Prescriptions   Medication Sig Dispense Refill     tiZANidine (ZANAFLEX) 4 MG tablet Take 2 tablets (8 mg) by mouth 3 times daily as needed for muscle spasms 90 tablet 3     buprenorphine (BUTRANS) 10 MCG/HR WK patch Place 1 patch onto the skin every 7 days 4 patch 0     levothyroxine (SYNTHROID) 25 mcg/mL SUSP 25 mcg every morning       nabumetone (RELAFEN) 750 MG tablet 750 mg 2 times daily       omeprazole (PRILOSEC) 20 MG CR capsule 20 mg 2 times daily       ondansetron (ZOFRAN-ODT) 4 MG ODT tab 4 mg every 12 hours as needed       lidocaine (LIDODERM) 5 % Patch Apply up to 3 patches to painful area at once for up to 12 h within a 24 h period.  Remove after 12 hours. 30 patch 1     fluticasone-salmeterol (ADVAIR DISKUS) 250-50 MCG/DOSE diskus inhaler        cetirizine (ZYRTEC) 10 MG tablet        polyethylene glycol (MIRALAX/GLYCOLAX) powder        FLUTICASONE FUROATE NA Spray 50 mcg in nostril At Bedtime       Multiple Vitamins-Minerals (MULTIVITAMIN PO) Take by mouth daily       Probiotic Product (PROBIOTIC DAILY PO) Take by mouth 2 times daily       LABORATORY VALUES:   No results for input(s): NA,  "POTASSIUM, CHLORIDE, CO2, ANIONGAP, GLC, BUN, CR, JUAN in the last 29067 hours.    CBC RESULTS: No results for input(s): WBC, RBC, HGB, HCT, MCV, MCH, MCHC, RDW, PLT in the last 13843 hours.    Most Recent 3 INR's:No lab results found.    Diagnostic tests:      ASSESSMENT:  Assessment:   1.) Chronic low back pain  2.) Post Laminectomy Syndrome  3.) Bilateral Lumbar Radiculitis  4.) Tom-Danlos Syndrome  5.) Chronic, continuous use of Opioids  6.) Migraines, Greater occipital neuralgia     Ada Welch is a 46 year old female who is seen at the pain clinic for follow up for her chronic pain.     Plan:  1. Interventions: None at this time.  SUNSHINE's and SCS and other options have been discussed with her but this would be a last resort as she feels her body \"rejects foreign objects\".    2. Medications: Continue butrans patch as prescribed, she is getting excellent benefit at this time and is on a stable dose.  Continue tizanidine 8mg to help with her muscle spasms.  Continue ibuprofen and ACETAMINOPHEN prn.  3. Physical Therapy:  Continue as scheduled - she also does aquatic therapy - she will continue her HEP  4. Psychology/Coping skills: continue therapy in [place]  5. Diagnostic Studies:  None today  6. Follow up: 3 months       Assessment will be ongoing with changes in treatment as indicated.  Benefits/risks/alternatives to treatment have been reviewed and the patient has been instructed to contact this office if they have any questions or concerns.  This plan of care has been discussed with the patient and the patient is in agreement.     Faiza Martinez MD, PHD    "

## 2017-06-12 NOTE — MR AVS SNAPSHOT
After Visit Summary   6/12/2017    Ada Welch    MRN: [id number]           Patient Information     Date Of Birth          1970        Visit Information        Provider Department      6/12/2017 12:10 PM Faiza Martinez MD [facility]        Today's Diagnoses     Postlaminectomy syndrome of lumbosacral region    -  1       Follow-ups after your visit        Your next 10 appointments already scheduled     Jul 10, 2017 12:10 PM CDT   (Arrive by 11:55 AM)   New Patient Visit with Ellyn Rivera MD   [facility] ([facility])    [address] [place] [address]  [address] [facility] [address] [phone]              Who to contact     Please call your clinic at [phone] to:    Ask questions about your health    Make or cancel appointments    Discuss your medicines    Learn about your test results    Speak to your doctor   If you have compliments or concerns about an experience at your clinic, or if you wish to file a complaint, please contact [facility] Patient Relations at [phone] or email us at [email or web address]         Additional Information About Your Visit        MyChart Information     [employer] gives you secure access to your electronic health record. If you see a primary care provider, you can also send messages to your care team and make appointments. If you have questions, please call your primary care clinic.  If you do not have a primary care provider, please call [phone] and they will assist you.      [employer] is an electronic gateway that provides easy, online access to your medical records. With [employer], you can request a clinic appointment, read your test results, renew a prescription or communicate with your care team.     To access your existing account, please contact your [facility] or call [phone] for  assistance.        Care EveryWhere ID     This is your Care EveryWhere ID. This could be used by other organizations to access your [facility] medical records  [id number]         Blood Pressure from Last 3 Encounters:   03/20/17 (!) 140/91   12/05/16 (!) 133/91   11/02/16 129/85    Weight from Last 3 Encounters:   03/20/17 83.9 kg (185 lb)   12/05/16 86.2 kg (190 lb)   11/02/16 88.5 kg (195 lb)              Today, you had the following     No orders found for display       Primary Care Provider    None Specified       No primary provider on file.        Thank you!     Thank you for choosing [facility] COMPREHENSIVE PAIN MANAGEMENT  for your care. Our goal is always to provide you with excellent care. Hearing back from our patients is one way we can continue to improve our services. Please take a few minutes to complete the written survey that you may receive in the mail after your visit with us. Thank you!             Your Updated Medication List - Protect others around you: Learn how to safely use, store and throw away your medicines at www.disposemymeds.org.          This list is accurate as of: 6/12/17 11:39 PM.  Always use your most recent med list.                   Brand Name Dispense Instructions for use    ADVAIR DISKUS 250-50 MCG/DOSE diskus inhaler   Generic drug:  fluticasone-salmeterol          buprenorphine 10 MCG/HR WK patch    BUTRANS    4 patch    Place 1 patch onto the skin every 7 days       cetirizine 10 MG tablet    zyrTEC         FLUTICASONE FUROATE NA      Spray 50 mcg in nostril At Bedtime       levothyroxine 25 mcg/mL Susp    SYNTHROID     25 mcg every morning       lidocaine 5 % Patch    LIDODERM    30 patch    Apply up to 3 patches to painful area at once for up to 12 h within a 24 h period.  Remove after 12 hours.       MULTIVITAMIN PO      Take by mouth daily       nabumetone 750 MG tablet    RELAFEN     750 mg 2 times daily       omeprazole 20 MG CR capsule    priLOSEC     20 mg  2 times daily       ondansetron 4 MG ODT tab    ZOFRAN-ODT     4 mg every 12 hours as needed       polyethylene glycol powder    MIRALAX/GLYCOLAX         PROBIOTIC DAILY PO      Take by mouth 2 times daily       tiZANidine 4 MG tablet    ZANAFLEX    90 tablet    Take 2 tablets (8 mg) by mouth 3 times daily as needed for muscle spasms

## 2017-06-13 VITALS
OXYGEN SATURATION: 98 % | HEIGHT: 67 IN | HEART RATE: 86 BPM | BODY MASS INDEX: 29.03 KG/M2 | DIASTOLIC BLOOD PRESSURE: 86 MMHG | SYSTOLIC BLOOD PRESSURE: 129 MMHG | WEIGHT: 185 LBS

## 2017-06-13 RX ORDER — BUPRENORPHINE 10 UG/H
1 PATCH TRANSDERMAL
Qty: 4 PATCH | Refills: 3 | Status: SHIPPED | OUTPATIENT
Start: 2017-06-13 | End: 2017-07-07

## 2017-06-13 NOTE — PROGRESS NOTES
[facility] : Progress Note    Date of visit: 6/12/2017    Interval history:  Ada Welch is a 45 year old female with past medical history significant for Ehler Danlos syndrome is known to me for chronic  low back pain, which is mostly located at lower lumbar area. The patient has very complex history of low back pain for nearly 24 years for which she underwent L4-S1 fusion (TLIF) 12/09/2013.  Postoperatively her  back pain even became much worse than preop.  Mainly LBP, but also radiates down both legs S1 dermatome distribution.   She then had  removal of posterior instrumentation (screws and guadalupe) with exploration of fusion.  Fusion noted to already be solid.  NO significant relief afforded by this surgery. The pain has been progressively worsened, limiting the patient s activities, affecting mood and sleep quality, and causing a decrease in the patient s quality of life. She is currently on chronic pain meds: Dilaudid 4 mg, 3 tabs/day which are not doing much to relief her symptoms.  She underwent multiple SUNSHINE's which were not helpful, and even made things worse.  Recently started aquatic based physical therapy. Previous EMG showed some L5 radiculopathy on left. Her pain significantly improved after we started her butrans patch. She continues to be more functional than before the butrans was prescribed. She is able to walk longer distances and do activities with less pain that before. She has occasional weakness when she is walking but denies constant, focal weakness. She has numbness in her left 4th toe that has been constant since her surgery. She denies any significant side effects from the medications. She also denies any new bowel or bladder issues, new weakness or numbness.       Recommendations/plan at the last visit included:  - Butrans patch 10 mcg/hr every week.  - Tizanidine 8 mg po q8 hours.  - Apply Lidocaine Patch 5% to intact  skin to cover the most painful area. Apply the prescribed number of patches (maximum of 3), only once for up to 12 hours.   -Continue aquatic based physical therapy. The patient is encouraged to stay active and to perform exercise as tolerated.   -Opioid Safety. The patient received full information regarding chronic pain management guidelines, appropriate opioid and adjuvant pain medication usage, potential side effects, adverse reactions, and risks of possible withdrawal and dependency. The patient has signed opioid contract and agreed to follow the established guidelines and the goals of treatment plan.  -f/u 3 months.     Since the last visit, Ada Welch reports:  - chronic low back pain: pain much improved although she occasionally reports breakthrough pain  - medication response: The patient had been taking butrans. The patient states that the medications are helpful. Her insurance didn't approve lidoderm patches, she has been using lidocaine cream, which has been helpful. In addition, she is using tizanidine/      Pain scores:  Pain intensity on average is 6 on a scale of 0-10 which is tolerable for her.      Current pain treatments:   Buprenorphine Transdermal 10mcg/hr q7days - very effective  Tizanidine 4mg TID - mildly effective  Ibuprofen  Acetaminophen     Past pain treatments:  Methadone  Fentanyl Patch  Hydromorphone  Lyrica  Meloxciam    Physical Exam:    General: Awake in no apparent distress. This patient is accompanied in the office by patient's  and kids.  Eyes: Sclerae are anicteric. PERRLA, EOMI   Neck: supple, no masses.   Lungs: unlabored.   Heart: regular rate and rhythm   Abdomen: soft non tender.  Extremities: Pulses are well palpable, no peripheral edema.   Musculoskeletal: All muscle groups are normal in bulk and tone. The patient changes position without pain behavior. The patient walks with a normal gait. Posture is normal. Muscle strength was rated at 5/5 in all groups in  the extremities. Examination of the joints reveals preserved range of motion.  Neurologic exam:Strength 5/5 for bilateral grasp, finger abduction, wrist extension, elbow flexion, elbow extension, shoulder abduction.  Strength 5/5 for bilateral dorsiflexion, plantarflexion, great toe extension, knee extension, hip flexion. Sensation intact throughout all dermatomes bilateral upper extremities and lower extremities  Psychiatric; Normal affect.   Skin: Warm and Dry.    Medications:  Current Outpatient Prescriptions   Medication Sig Dispense Refill     tiZANidine (ZANAFLEX) 4 MG tablet Take 2 tablets (8 mg) by mouth 3 times daily as needed for muscle spasms 90 tablet 3     buprenorphine (BUTRANS) 10 MCG/HR WK patch Place 1 patch onto the skin every 7 days 4 patch 0     levothyroxine (SYNTHROID) 25 mcg/mL SUSP 25 mcg every morning       nabumetone (RELAFEN) 750 MG tablet 750 mg 2 times daily       omeprazole (PRILOSEC) 20 MG CR capsule 20 mg 2 times daily       ondansetron (ZOFRAN-ODT) 4 MG ODT tab 4 mg every 12 hours as needed       lidocaine (LIDODERM) 5 % Patch Apply up to 3 patches to painful area at once for up to 12 h within a 24 h period.  Remove after 12 hours. 30 patch 1     fluticasone-salmeterol (ADVAIR DISKUS) 250-50 MCG/DOSE diskus inhaler        cetirizine (ZYRTEC) 10 MG tablet        polyethylene glycol (MIRALAX/GLYCOLAX) powder        FLUTICASONE FUROATE NA Spray 50 mcg in nostril At Bedtime       Multiple Vitamins-Minerals (MULTIVITAMIN PO) Take by mouth daily       Probiotic Product (PROBIOTIC DAILY PO) Take by mouth 2 times daily           LABORATORY VALUES:   No results for input(s): NA, POTASSIUM, CHLORIDE, CO2, ANIONGAP, GLC, BUN, CR, JUAN in the last 17839 hours.    CBC RESULTS: No results for input(s): WBC, RBC, HGB, HCT, MCV, MCH, MCHC, RDW, PLT in the last 34863 hours.    Most Recent 3 INR's:No lab results found.    Diagnostic tests:            ASSESSMENT:    Assessment:   1.) Chronic low back  "pain  2.) Post Laminectomy Syndrome  3.) Bilateral Lumbar Radiculitis  4.) Tom-Danlos Syndrome  5.) Chronic, continuous use of Opioids  6.) Migraines, Greater occipital neuralgia     Ada Welch is a 46 year old female who is seen at the pain clinic for follow up for her chronic pain.     Plan:  1. Interventions: None at this time.  SUNSHINE's and SCS and other options have been discussed with her but this would be a last resort as she feels her body \"rejects foreign objects\".    2. Medications: Continue butrans patch as prescribed, she is getting excellent benefit at this time and is on a stable dose.  Continue tizanidine 8mg to help with her muscle spasms.  Continue ibuprofen and ACETAMINOPHEN prn.  3. Physical Therapy:  Continue as scheduled - she also does aquatic therapy - she will continue her HEP  4. Psychology/Coping skills: continue therapy in [place]  5. Diagnostic Studies:  None today  6. Follow up: 3 months       Assessment will be ongoing with changes in treatment as indicated.  Benefits/risks/alternatives to treatment have been reviewed and the patient has been instructed to contact this office if they have any questions or concerns.  This plan of care has been discussed with the patient and the patient is in agreement.     Faiza Martinez MD, PHD  "

## 2017-06-13 NOTE — NURSING NOTE
Patient was given a typed Microsoft word document with discharge instructions due to the EPIC computer system being nonfunctional. Patient verbalized understanding of discharge information that was given and gave this RN permission to send her AVS via My Chart when the EPIC system becomes available. Patient stated that she would call and make her return appointment in 12 weeks. The following instructions/AVS will be sent via My Chart to the patient, for further clarification and understanding of the discharge instructions.       1. Your doctor has ordered a physical therapy referral today for your neck/back pain. If the physical therapy department does not contact you 2 business days call [phone] to schedule your appointment.     2. Start using the compound cream. The pharmacy that can make the prescription cream it is listed below.  [facility]   [address] [place] [address]  Phone- [phone]    3. Continue to use your butrans patch.    Follow up:  In 12 weeks with Dr. Martinez.       To speak with a nurse, schedule/reschedule/cancel a clinic appointment, or request a medication refill call: [phone]     You can also reach us by [employer]: [email or web address]    For refills, please call on Monday, 1 week before your medication runs out. The doctors are not always in clinic, so this gives us time to get your prescriptions ready.  Please let us know the name of the medication you are requesting a refill of.

## 2017-06-13 NOTE — PATIENT INSTRUCTIONS
1. Your doctor has ordered a physical therapy referral today for your neck/back pain. If the physical therapy department does not contact you 2 business days call [phone] to schedule your appointment.     2. Start using the compound cream. The pharmacy that can make the prescription cream it is listed below.  [facility]   [address] [place] [address]  Phone- [phone]    3. Continue to use your butrans patch.    Follow up:  In 12 weeks with Dr. Martinez.       To speak with a nurse, schedule/reschedule/cancel a clinic appointment, or request a medication refill call: [phone]     You can also reach us by [place]: [email or web address]    For refills, please call on Monday, 1 week before your medication runs out. The doctors are not always in clinic, so this gives us time to get your prescriptions ready.  Please let us know the name of the medication you are requesting a refill of.

## 2017-06-14 ENCOUNTER — THERAPY VISIT (OUTPATIENT)
Dept: PHYSICAL THERAPY | Facility: CLINIC | Age: 47
End: 2017-06-14
Payer: COMMERCIAL

## 2017-06-14 DIAGNOSIS — M96.1 POSTLAMINECTOMY SYNDROME, LUMBAR REGION: Primary | ICD-10-CM

## 2017-06-14 PROCEDURE — 97530 THERAPEUTIC ACTIVITIES: CPT | Mod: GP | Performed by: PHYSICAL THERAPIST

## 2017-06-14 PROCEDURE — 97110 THERAPEUTIC EXERCISES: CPT | Mod: GP | Performed by: PHYSICAL THERAPIST

## 2017-06-14 PROCEDURE — 97161 PT EVAL LOW COMPLEX 20 MIN: CPT | Mod: GP | Performed by: PHYSICAL THERAPIST

## 2017-06-14 NOTE — MR AVS SNAPSHOT
After Visit Summary   6/14/2017    Ada Welch    MRN: [id number]           Patient Information     Date Of Birth          1970        Visit Information        Provider Department      6/14/2017 1:30 PM Herb Luevano [facility] [place] [facility]        Today's Diagnoses     Postlaminectomy syndrome, lumbar region    -  1       Follow-ups after your visit        Your next 10 appointments already scheduled     Jun 16, 2017 12:50 PM CDT   [facility] Spine with Herb Luevano [facility] [place] [facility] ([facility] [place])    [address] [place] [address]  [address]  [place] [address]   [phone]            Jun 19, 2017 10:20 AM CDT   [facility] Spine with Herb Luevano [facility] [place] [facility] ([facility] [place])    [address] [place] [address]  [address]  [place] [address]   [phone]            Jun 21, 2017  3:30 PM CDT   [facility] Spine with Herb Luevano [facility] [place] [facility] ([facility] [place])    [address]  [place] [address]   [phone]            Jun 26, 2017 10:20 AM CDT   [facility]AR[facility] Spine with Herb Luevano [facility] [place] [facility] ([facility] [place])    [address] [place], [place] [address]  [place] [address]   [phone]            Jun 28, 2017  3:30 PM CDT   DARIA Spine with Herb Luevano [facility] [place] [facility] ([facility] [place])    [address]  [place] [address]   [phone]            Jul 10, 2017 10:20 AM CDT   [facility] Spine with Herb Luevano [facility] [place] [facility] ([facility] [place])    [address] [place], [place] [address]  [place] [address]   [phone]            Jul 10, 2017 12:10 PM CDT   (Arrive by 11:55 AM)   New Patient Visit with Ellyn Rivera MD    [facility] ([facility])    [address]  4th Floor  [facility] [address]   [phone]            Jul 17, 2017 10:20 AM CDT   DARIA Spine with Herb Luevano PT   [facility] [place] [facility] ([facility] [place])    [address] [place] [address]  [address]  [place] [address] [phone]            Jul 19, 2017  3:30 PM CDT   DARIA Spine with Bratonja Luevano PT   [facility] [place] [facility] ([facility] [place])    [address] [place] [address]  [address]  [place] [address] [phone]              Who to contact     If you have questions or need follow up information about today's clinic visit or your schedule please contact [facility] directly at [phone].  Normal or non-critical lab and imaging results will be communicated to you by [employer], letter or phone within 4 business days after the clinic has received the results. If you do not hear from us within 7 days, please contact the clinic through [employer] or phone. If you have a critical or abnormal lab result, we will notify you by phone as soon as possible.  Submit refill requests through [employer] or call your pharmacy and they will forward the refill request to us. Please allow 3 business days for your refill to be completed.          Additional Information About Your Visit        [employer] Information     [employer] gives you secure access to your electronic health record. If you see a primary care provider, you can also send messages to your care team and make appointments. If you have questions, please call your primary care clinic.  If you do not have a primary care provider, please call [phone] and they will assist you.        Care EveryWhere ID     This is your Care EveryWhere ID. This could be used by other organizations to access your [place] medical records  [id number]         Blood  Pressure from Last 3 Encounters:   06/12/17 129/86   03/20/17 (!) 140/91   12/05/16 (!) 133/91    Weight from Last 3 Encounters:   06/12/17 83.9 kg (185 lb)   03/20/17 83.9 kg (185 lb)   12/05/16 86.2 kg (190 lb)              We Performed the Following     HC PT EVAL, LOW COMPLEXITY     DARIA INITIAL EVAL REPORT     THERAPEUTIC ACTIVITIES     THERAPEUTIC EXERCISES        Primary Care Provider    None Specified       No primary provider on file.        Thank you!     Thank you for [facility]  for your care. Our goal is always to provide you with excellent care. Hearing back from our patients is one way we can continue to improve our services. Please take a few minutes to complete the written survey that you may receive in the mail after your visit with us. Thank you!             Your Updated Medication List - Protect others around you: Learn how to safely use, store and throw away your medicines at www.disposemymeds.org.          This list is accurate as of: 6/14/17  4:04 PM.  Always use your most recent med list.                   Brand Name Dispense Instructions for use    ADVAIR DISKUS 250-50 MCG/DOSE diskus inhaler   Generic drug:  fluticasone-salmeterol          buprenorphine 10 MCG/HR WK patch    BUTRANS    4 patch    Place 1 patch onto the skin every 7 days       cetirizine 10 MG tablet    zyrTEC         FLUTICASONE FUROATE NA      Spray 50 mcg in nostril At Bedtime       ketamine (KETALAR) 5%-gabapentin (NEURONTIN) 8%-lidocaine 2.5% 5%-8% Gel topical PLO cream     30 g    Apply 1 g topically 3 times daily as needed       levothyroxine 25 mcg/mL Susp    SYNTHROID     25 mcg every morning       lidocaine 5 % Patch    LIDODERM    30 patch    Apply up to 3 patches to painful area at once for up to 12 h within a 24 h period.  Remove after 12 hours.       MULTIVITAMIN PO      Take by mouth daily       nabumetone 750 MG tablet    RELAFEN     750 mg 2 times  daily       omeprazole 20 MG CR capsule    priLOSEC     20 mg 2 times daily       ondansetron 4 MG ODT tab    ZOFRAN-ODT     4 mg every 12 hours as needed       polyethylene glycol powder    MIRALAX/GLYCOLAX         PROBIOTIC DAILY PO      Take by mouth 2 times daily       tiZANidine 4 MG tablet    ZANAFLEX    90 tablet    Take 2 tablets (8 mg) by mouth 3 times daily as needed for muscle spasms

## 2017-06-14 NOTE — PROGRESS NOTES
[facility]  LUMBAR SPINE EVALUATION  Date: 6/14/17      Precautions/Restrictions/MD instructions:   Per orders from:   6/13/17 Faiza Martinez MD (eval and treat chronic neck/back pain)  M96.1 Postlaminectomy syndrome of lumbosacral region    M96.1 Postlaminectomy syndrome of lumbar region    Therapist Impression:   Pt is a *** y/o ***, with a history of ***. Pt presents with exam finding of ***; these finding are consistent with  ***. These impairments limit the patient's ability to ***. This patient would benefit from skilled PT service to address their functional limitation in ADLs.      SUBJECTIVE    Injury/Condition Details:  Onset Timing/Date 6/13/17   Presenting History of Chief Complaint/Symptoms Pt reports have an onset of symptoms in 1999 with back issues. She noticed it during pregnancy.    She has had a series of severe pain fair-ups, and has been diagnosed with EDS in 2014.    As of recent, she hasn't saw a therapist for corrective exercise, which has limited her ability to perform her ADLs.     She has a history of a spinal fusion 2013 and a revision in 2014 (L4 L5 S1)   Mechanism EDS/ chronic      Symptom Behavior Details    Primary Symptoms Constant symptoms; worsen with activity   Worst Pain 8/10 (with various activity)   Best Pain 3-4/10 (with rest certain activate )   Time of day dependent? Worse in evening after activity   Recent symptom change? no change in symptoms     Prior Testing/Intervention for current condition:  Prior Tests  x-ray and MRI   Prior Treatment PT , Surgery(ies): fusions and medication     Lifestyle & General Medical History:  Employment Not employed    Usual physical activities  (within past year) Walking, ADLs,    General health status (self-report) fair   Orthopaedic history S/p fusion surgery in 2013/2014, EDS   Notable medical history Tachycardia, neural inflammatory issue, malabsorption issue, cystic changes of liver, history lymph  node increase, sinus    Current Functional Status:  Activity: Symptoms/Intensity tolerated/Distance Tolerated   walking    Sitting    standing    Lifting/carrying/reaching    Stairs       Previous Functional Status: limited for years  Patient's Goal(s): reduce pain with ADLs  Current HEP/exercise regimen: history of PT   Medications: ***  Hand/Leg Dominance: ***  Red Flags: Patient denies the following: Clicking, Popping, Catching (knee); Pain with Cough / Sneeze / Laughing ; Night Pain ; Fever/Chills ; Weakness ; Numbness/Tingling ; Saddle Anesthesia ; Change in Bowel or Bladder ; Chest Pain ; Weight Loss/Gain ; Rashes or Lesions of the Skin ; Non-Healing Wounds ; Edema of the Feet ; Vision Change   Other relevant comments:       OBJECTIVE    Posture: ***    Gait: ***    SL Balance:  R: *** sec   L: *** sec   - Excessive asymmetric trunk shift over stance leg: ***    Functional SCREEN:  - Overhead Squat: ***  -SL Squat: ***    Neurological:    Motor/MMT: (0-5 MMT Scale)  Myotomes/MMT L R   L4 (ankle DF)     L5 (g. toe ext)     S1 (ankle PF or knee flex)       Sensory Deficit:    Reflexes:  -Patellar (L3-4):  -Achilles (S1):       Dural Signs:   L R   Slump     SLR       Flexability Testing:   L R Angle   Prone Knee Bend (rectus Femoris):   Angle measured from terminal knee extension   90/90 SLR (hamstring):   Angled measured from terminal knee extension       AROM: (Major, Moderate, Minimal or Nil loss)  Movement Loss Neri Mod Min Nil   Flexion       Extension         Repeated movement testing: (During: produces, abolishes, increases, decreases, no effect, centralizing, peripheralizing; After: better, worse, no better, no worse, no effect, centralized, peripheralized)  Movement Result   Flexion    Extension      Seated Thoracic rotation:    Left    Right         Strength assessment:    Strength testing (0-5 MMT scale) L R   hip flexion     Hip Abduction     Hip Extension     Knee Extension     Knee Flexion     Upper  Abdominal crunch      Lower Abdominal BILAT SLR lower (angle measured from 90 degrees hip flexion)         Lumbar Mobility/Spring Testing: ***    Palpation: ***    Other Tests: ***    {REHAB NOTES:[id number]}

## 2017-06-14 NOTE — LETTER
[facility]  [address] [place] [address]  [address]  [facility] [id number] [phone]    2017    Re: Ada Welch   :  1970  MRN:  [id number]   REFERRING PHYSICIAN:   Faiza Martinez    [facility]    Date of Initial Evaluation:  17  Visits:  Rxs Used: 1  Reason for Referral:  Postlaminectomy syndrome, lumbar region    EVALUATION SUMMARY    Precautions/Restrictions/MD instructions:   Per orders from:   17 Faiza Martinez MD (eval and treat chronic neck/back pain)  M96.1 Postlaminectomy syndrome of lumbosacral region    M96.1 Postlaminectomy syndrome of lumbar region  Therapist Impression:   Pt is a 45 y/o F, with a significant medical history. She reports being diagnosed with Ehler Danlos syndrome in  after history of severe and recurrent back pain since . She had a failed spinal fusion in  (L4-S1) due to hardware loosening and a revision surgery in . Since then, she has been in therapy off/on with coordinated care form a pain clinic. She presents this date for help developing a HEP to better manage her chronic pain symptoms. She reports receiving some manual therapy intervention in the past, which has helped her manage her back pain. Pt presents with exam finding of increased BMI, medium risk STarT Back score, impaired functional hip/ trunk stability in SLS, gross LE weakness, and limited thoracic rotation, and a hypersensitive pain response to light stretching of the lumbar region; these finding are consistent with chronic back pain with a likely centrally mediated pain syndrome. These impairments limit the patient's ability to sit, walk, standing, or exercise without extreme pain. This patient would benefit from skilled PT service to address their functional limitation in ADLs.    NOTE: Pt has history of long-term pain medication use     Re: Ada Welch   :    1970    SUBJECTIVE  Injury/Condition Details:  Onset Timing/Date 17   Presenting History of Chief Complaint/Symptoms Pt reports have an onset of symptoms in  with back issues. She noticed it during pregnancy.    She has had a series of severe pain fair-ups, and has been diagnosed with EDS in .    As of recent, she hasn't saw a therapist for corrective exercise, which has limited her ability to perform her ADLs.     She has a history of a spinal fusion  and a revision in  (L4 L5 S1)   Mechanism EDS/ chronic      Symptom Behavior Details    Primary Symptoms Constant symptoms; worsen with activity   Worst Pain 8/10 (with various activity)   Best Pain 3-4/10 (with rest certain activate )   Time of day dependent? Worse in evening after activity   Recent symptom change? no change in symptoms     Prior Testing/Intervention for current condition:  Prior Tests  x-ray and MRI   Prior Treatment PT , Surgery(ies): fusions and medication     Lifestyle & General Medical History:  Employment Not employed    Usual physical activities  (within past year) Walking, ADLs,    General health status (self-report) fair   Orthopaedic history S/p fusion surgery in , EDS   Notable medical history Tachycardia, neural inflammatory issue, malabsorption issue, cystic changes of liver, history lymph node increase, sinus              Re: Ada Welch   :   1970  Current Functional Status:  Activity: Symptoms/Intensity tolerated/Distance Tolerated   walking 5/10   Sitting 5/10   standing    Lifting/carrying/reaching 5/10 8/10   Stairs       Previous Functional Status: limited for years  Patient's Goal(s): reduce pain with ADLs  Current HEP/exercise regimen: history of PT   Medications: thyroid, NSAIDs, opiods, muscle relaxants, GERD, allergy,  Hand/Leg Dominance: R  Red Flags: Patient denies the following: Clicking, Popping, Catching (knee); Pain with Cough / Sneeze / Laughing ; Night Pain ;  Fever/Chills ; Weakness ; Numbness/Tingling ; Saddle Anesthesia ; Change in Bowel or Bladder ; Chest Pain ; Weight Loss/Gain ; Rashes or Lesions of the Skin ; Non-Healing Wounds ; Edema of the Feet ; Vision Change   Other relevant comments:       OBJECTIVE  Posture: central trunk obesity   Gait: non-antalgic   SL Balance:  R: 20 sec   L: 20 sec   - Excessive asymmetric trunk shift over stance leg: pos. Compensated trendelenburg     AROM: (Major, Moderate, Minimal or Nil loss)  Movement Loss Neri Mod Min Nil   Flexion x   Tightness    Extension   x      Seated Thoracic rotation: 50% limited BILAT         Re: Ada Welch   :   1970    Strength assessment:    Strength testing (0-5 MMT scale) L R   hip flexion 3 3   Hip Abduction 3 3   Hip Extension 3 3   Knee Extension 3 3   Knee Flexion 3 3   Upper Abdominal crunch      Lower Abdominal BILAT SLR lower (angle measured from 90 degrees hip flexion)       Palpation: tender in lumbar region to light stretching, pt crying during light stretching into lumbar rotation (abnormal response for intensity)   Other Tests: 90/90 hip ER: 3/5 BILAT  Patient is a 46 year old female with lumbar, pelvic and both sides hip complaints.    Patient has the following significant findings with corresponding treatment plan.                Diagnosis 1:  Chronic back pain  Pain -  manual therapy, STS, splint/taping/bracing/orthotics, self management, education, directional preference exercise and home program  Decreased ROM/flexibility - manual therapy, therapeutic exercise and therapeutic activity  Decreased strength - therapeutic exercise, therapeutic activities and home program  Impaired muscle performance - neuro re-education and home program  Decreased function - therapeutic activities and home program    Therapy Evaluation Codes:   1) History comprised of:   Personal factors that impact the plan of care:      Age, Anxiety, Cognition, Living environment, Overall behavior  pattern, Past/current experiences, Profession, Social history/culture, Time since onset of symptoms and Work status.    Comorbidity factors that impact the plan of care are:      Asthma, Chemical Dependency, Depression, Dizziness, Fibromyalgia, Migraines/headaches, Numbness/tingling, Osteoarthritis, Overweight, Pain at night/rest, Smoking, Stroke and Weakness.     Medications impacting care: Anti-depressant, Anti-inflammatory, Muscle relaxant, Pain and Sleep.  2) Examination of Body Systems comprised of:   Body structures and functions that impact the plan of care:      Cervical spine, Hip, Knee, Lumbar spine, Pelvis, Sacral illiac joint and Thoracic Spine.   Activity limitations that impact the plan of care are:        Re: Ada Welch   :   1970     Bathing, Bending, Cooking, Driving, Dressing, Grasping, Jumping, Lifting,  Reading/Computer work, Running, Sitting, Sports, Squatting/kneeling, Stairs, Standing,  Walking, Working and Sleeping.  3) Clinical presentation characteristics are:   Stable/Uncomplicated.  4) Decision-Making    Low complexity using standardized patient assessment instrument and/or measureable assessment of functional outcome.  Cumulative Therapy Evaluation is: Low complexity.    Communication ability:  Patient appears to be able to clearly communicate and understand verbal and written communication and follow directions correctly.  Treatment Explanation - The following has been discussed with the patient:   RX ordered/plan of care  Anticipated outcomes  Possible risks and side effects  This patient would benefit from PT intervention to resume normal activities.   Rehab potential is fair.  Frequency:  2 X week for first month   Duration:  for 12-16 visits  Discharge Plan:  Achieve all LTG.  Independent in home treatment program.  Reach maximal therapeutic benefit.    Thank you for your referral.    INQUIRIES  Therapist: Herb Luevano, PT   [facility]  [facility]  [place] [facility]  [address] [place] [address]  [address]  [place] [address]  Phone: [phone]  Fax: [phone]

## 2017-06-14 NOTE — PROGRESS NOTES
[facility]  LUMBAR SPINE EVALUATION  Date: 6/14/17      Precautions/Restrictions/MD instructions:   Per orders from:   6/13/17 Faiza Martinez MD (eval and treat chronic neck/back pain)  M96.1 Postlaminectomy syndrome of lumbosacral region    M96.1 Postlaminectomy syndrome of lumbar region    Therapist Impression:   Pt is a 47 y/o F, with a significant medical history. She reports being diagnosed with Ehler Danlos syndrome in 2014 after history of severe and recurrent back pain since 1990. She had a failed spinal fusion in 2013 (L4-S1) due to hardware loosening and a revision surgery in 2014. Since then, she has been in therapy off/on with coordinated care form a pain clinic. She presents this date for help developing a HEP to better manage her chronic pain symptoms. She reports receiving some manual therapy intervention in the past, which has helped her manage her back pain. Pt presents with exam finding of increased BMI, medium risk STarT Back score, impaired functional hip/ trunk stability in SLS, gross LE weakness, and limited thoracic rotation, and a hypersensitive pain response to light stretching of the lumbar region; these finding are consistent with chronic back pain with a likely centrally mediated pain syndrome. These impairments limit the patient's ability to sit, walk, standing, or exercise without extreme pain. This patient would benefit from skilled PT service to address their functional limitation in ADLs.    NOTE: Pt has history of long-term pain medication use     SUBJECTIVE    Injury/Condition Details:  Onset Timing/Date 6/13/17   Presenting History of Chief Complaint/Symptoms Pt reports have an onset of symptoms in 1999 with back issues. She noticed it during pregnancy.    She has had a series of severe pain fair-ups, and has been diagnosed with EDS in 2014.    As of recent, she hasn't saw a therapist for corrective exercise, which has limited her ability to  perform her ADLs.     She has a history of a spinal fusion 2013 and a revision in 2014 (L4 L5 S1)   Mechanism EDS/ chronic      Symptom Behavior Details    Primary Symptoms Constant symptoms; worsen with activity   Worst Pain 8/10 (with various activity)   Best Pain 3-4/10 (with rest certain activate )   Time of day dependent? Worse in evening after activity   Recent symptom change? no change in symptoms     Prior Testing/Intervention for current condition:  Prior Tests  x-ray and MRI   Prior Treatment PT , Surgery(ies): fusions and medication     Lifestyle & General Medical History:  Employment Not employed    Usual physical activities  (within past year) Walking, ADLs,    General health status (self-report) fair   Orthopaedic history S/p fusion surgery in 2013/2014, EDS   Notable medical history Tachycardia, neural inflammatory issue, malabsorption issue, cystic changes of liver, history lymph node increase, sinus    Current Functional Status:  Activity: Symptoms/Intensity tolerated/Distance Tolerated   walking 5/10   Sitting 5/10   standing    Lifting/carrying/reaching 5/10 8/10   Stairs       Previous Functional Status: limited for years  Patient's Goal(s): reduce pain with ADLs  Current HEP/exercise regimen: history of PT   Medications: thyroid, NSAIDs, opiods, muscle relaxants, GERD, allergy,  Hand/Leg Dominance: R  Red Flags: Patient denies the following: Clicking, Popping, Catching (knee); Pain with Cough / Sneeze / Laughing ; Night Pain ; Fever/Chills ; Weakness ; Numbness/Tingling ; Saddle Anesthesia ; Change in Bowel or Bladder ; Chest Pain ; Weight Loss/Gain ; Rashes or Lesions of the Skin ; Non-Healing Wounds ; Edema of the Feet ; Vision Change   Other relevant comments:       OBJECTIVE    Posture: central trunk obesity     Gait: non-antalgic     SL Balance:  R: 20 sec   L: 20 sec   - Excessive asymmetric trunk shift over stance leg: pos. Compensated trendelenburg       AROM: (Major, Moderate, Minimal  or Nil loss)  Movement Loss Neri Mod Min Nil   Flexion x   Tightness    Extension   x        Seated Thoracic rotation: 50% limited BILAT       Strength assessment:    Strength testing (0-5 MMT scale) L R   hip flexion 3 3   Hip Abduction 3 3   Hip Extension 3 3   Knee Extension 3 3   Knee Flexion 3 3   Upper Abdominal crunch      Lower Abdominal BILAT SLR lower (angle measured from 90 degrees hip flexion)       Palpation: tender in lumbar region to light stretching, pt crying during light stretching into lumbar rotation (abnormal response for intensity)     Other Tests: 90/90 hip ER: 3/5 BILAT    Patient is a 46 year old female with lumbar, pelvic and both sides hip complaints.    Patient has the following significant findings with corresponding treatment plan.                Diagnosis 1:  Chronic back pain  Pain -  manual therapy, STS, splint/taping/bracing/orthotics, self management, education, directional preference exercise and home program  Decreased ROM/flexibility - manual therapy, therapeutic exercise and therapeutic activity  Decreased strength - therapeutic exercise, therapeutic activities and home program  Impaired muscle performance - neuro re-education and home program  Decreased function - therapeutic activities and home program    Therapy Evaluation Codes:   1) History comprised of:   Personal factors that impact the plan of care:      Age, Anxiety, Cognition, Living environment, Overall behavior pattern, Past/current experiences, Profession, Social history/culture, Time since onset of symptoms and Work status.    Comorbidity factors that impact the plan of care are:      Asthma, Chemical Dependency, Depression, Dizziness, Fibromyalgia, Migraines/headaches, Numbness/tingling, Osteoarthritis, Overweight, Pain at night/rest, Smoking, Stroke and Weakness.     Medications impacting care: Anti-depressant, Anti-inflammatory, Muscle relaxant, Pain and Sleep.  2) Examination of Body Systems comprised  of:   Body structures and functions that impact the plan of care:      Cervical spine, Hip, Knee, Lumbar spine, Pelvis, Sacral illiac joint and Thoracic Spine.   Activity limitations that impact the plan of care are:      Bathing, Bending, Cooking, Driving, Dressing, Grasping, Jumping, Lifting, Reading/Computer work, Running, Sitting, Sports, Squatting/kneeling, Stairs, Standing, Walking, Working and Sleeping.  3) Clinical presentation characteristics are:   Stable/Uncomplicated.  4) Decision-Making    Low complexity using standardized patient assessment instrument and/or measureable assessment of functional outcome.  Cumulative Therapy Evaluation is: Low complexity.    Previous and current functional limitations:  (See Goal Flow Sheet for this information)    Short term and Long term goals: (See Goal Flow Sheet for this information)     Communication ability:  Patient appears to be able to clearly communicate and understand verbal and written communication and follow directions correctly.  Treatment Explanation - The following has been discussed with the patient:   RX ordered/plan of care  Anticipated outcomes  Possible risks and side effects  This patient would benefit from PT intervention to resume normal activities.   Rehab potential is fair.    Frequency:  2 X week for first month   Duration:  for 12-16 visits  Discharge Plan:  Achieve all LTG.  Independent in home treatment program.  Reach maximal therapeutic benefit.    Please refer to the daily flowsheet for treatment today, total treatment time and time spent performing 1:1 timed codes.

## 2017-06-15 DIAGNOSIS — G89.4 PAIN SYNDROME, CHRONIC: ICD-10-CM

## 2017-06-15 DIAGNOSIS — Q79.60 EHLERS-DANLOS SYNDROME: Primary | ICD-10-CM

## 2017-06-15 NOTE — NURSING NOTE
Order being place for a neurology consult. When patient was in clinic this week she forgot to ask for this consult. Dr. Martinez agrees that this is a specialty that she will benefit from medically.

## 2017-06-16 ENCOUNTER — THERAPY VISIT (OUTPATIENT)
Dept: PHYSICAL THERAPY | Facility: CLINIC | Age: 47
End: 2017-06-16
Payer: COMMERCIAL

## 2017-06-16 DIAGNOSIS — M96.1 POSTLAMINECTOMY SYNDROME, LUMBAR REGION: ICD-10-CM

## 2017-06-16 PROCEDURE — 97110 THERAPEUTIC EXERCISES: CPT | Mod: GP | Performed by: PHYSICAL THERAPIST

## 2017-06-16 PROCEDURE — 97140 MANUAL THERAPY 1/> REGIONS: CPT | Mod: GP | Performed by: PHYSICAL THERAPIST

## 2017-06-19 ENCOUNTER — THERAPY VISIT (OUTPATIENT)
Dept: PHYSICAL THERAPY | Facility: CLINIC | Age: 47
End: 2017-06-19
Payer: COMMERCIAL

## 2017-06-19 DIAGNOSIS — M96.1 POSTLAMINECTOMY SYNDROME, LUMBAR REGION: ICD-10-CM

## 2017-06-19 PROCEDURE — 97530 THERAPEUTIC ACTIVITIES: CPT | Mod: GP | Performed by: PHYSICAL THERAPIST

## 2017-06-19 PROCEDURE — 97140 MANUAL THERAPY 1/> REGIONS: CPT | Mod: GP | Performed by: PHYSICAL THERAPIST

## 2017-06-19 PROCEDURE — 97112 NEUROMUSCULAR REEDUCATION: CPT | Mod: GP | Performed by: PHYSICAL THERAPIST

## 2017-06-21 ENCOUNTER — THERAPY VISIT (OUTPATIENT)
Dept: PHYSICAL THERAPY | Facility: CLINIC | Age: 47
End: 2017-06-21
Payer: COMMERCIAL

## 2017-06-21 ENCOUNTER — CARE COORDINATION (OUTPATIENT)
Dept: GASTROENTEROLOGY | Facility: CLINIC | Age: 47
End: 2017-06-21

## 2017-06-21 DIAGNOSIS — M96.1 POSTLAMINECTOMY SYNDROME, LUMBAR REGION: ICD-10-CM

## 2017-06-21 PROCEDURE — 97110 THERAPEUTIC EXERCISES: CPT | Mod: GP | Performed by: PHYSICAL THERAPIST

## 2017-06-21 PROCEDURE — 97530 THERAPEUTIC ACTIVITIES: CPT | Mod: GP | Performed by: PHYSICAL THERAPIST

## 2017-06-21 NOTE — PROGRESS NOTES
[facility] Referral       Referring provider: Dr. Sutton - [facility] Contact: Not provided    Phone: [phone]      Referred to: [facility] - Specific provider - Dr Harshal Hebert     Referral Received: 6/9/2017 - intake note delayed d/t provider on leave.     Records received: 6/19/17 - additional records available in Care Everywhere. Will update day of appointment.     Images received: FREDY REILLY review date: NA - Ok to schedule per scheduling protocol.     Evaluation for: Eosinophilic esophagitis       Clinical History (per RN review of records provided): Minimal records available at time of referral - will need updated Care Everywhere at time of visit     - Patient evaluated by GI at [facility] for dysphasia. Upper endoscopy completed in 03/2017 - esophagitis noted and concerns for possible EOE. Hiatal hernia noted.  Biopsies obtained, full reports available in care everywhere.     - Note of starting PPI mentioned but no PPI medication listed in Medical list.       MD Decision for clinic consultation/Orders:     - Ok to schedule patient for clinic consult per provider scheduling guidelines.         Referral updates/Patient contacted:     - Note sent to new patient scheduling to contact pt with appointment info.

## 2017-06-28 ENCOUNTER — THERAPY VISIT (OUTPATIENT)
Dept: PHYSICAL THERAPY | Facility: CLINIC | Age: 47
End: 2017-06-28
Payer: COMMERCIAL

## 2017-06-28 DIAGNOSIS — M96.1 POSTLAMINECTOMY SYNDROME, LUMBAR REGION: ICD-10-CM

## 2017-06-28 PROCEDURE — 97530 THERAPEUTIC ACTIVITIES: CPT | Mod: GP | Performed by: PHYSICAL THERAPIST

## 2017-06-28 PROCEDURE — 97140 MANUAL THERAPY 1/> REGIONS: CPT | Mod: GP | Performed by: PHYSICAL THERAPIST

## 2017-07-07 DIAGNOSIS — M96.1 POSTLAMINECTOMY SYNDROME OF LUMBAR REGION: ICD-10-CM

## 2017-07-07 RX ORDER — BUPRENORPHINE 10 UG/H
1 PATCH TRANSDERMAL
Qty: 4 PATCH | Refills: 3 | Status: SHIPPED | OUTPATIENT
Start: 2017-07-07 | End: 2017-08-07

## 2017-07-07 NOTE — TELEPHONE ENCOUNTER
Patient called and left message with call center that medication script was needed. Patient was called and message was left for her regarding this script and her questions regarding her neurosurgery appointment.

## 2017-07-10 ASSESSMENT — ENCOUNTER SYMPTOMS
HYPERTENSION: 0
HEARTBURN: 0
DIARRHEA: 1
TROUBLE SWALLOWING: 1
SINUS CONGESTION: 1
RECTAL BLEEDING: 1
EYE REDNESS: 1
EXERCISE INTOLERANCE: 1
LOSS OF CONSCIOUSNESS: 1
MYALGIAS: 1
TINGLING: 1
FLANK PAIN: 1
VOMITING: 1
EYE WATERING: 0
FEVER: 0
POSTURAL DYSPNEA: 1
DOUBLE VISION: 1
SHORTNESS OF BREATH: 1
POLYDIPSIA: 0
HALLUCINATIONS: 0
DYSURIA: 1
BACK PAIN: 1
NAIL CHANGES: 0
LIGHT-HEADEDNESS: 1
EYE IRRITATION: 1
FATIGUE: 1
BRUISES/BLEEDS EASILY: 1
NIGHT SWEATS: 0
INCREASED ENERGY: 0
DISTURBANCES IN COORDINATION: 1
BLOATING: 1
SORE THROAT: 0
SWOLLEN GLANDS: 1
CLAUDICATION: 1
WEIGHT LOSS: 0
NECK PAIN: 1
SLEEP DISTURBANCES DUE TO BREATHING: 0
SEIZURES: 0
NAUSEA: 1
LEG SWELLING: 1
COUGH DISTURBING SLEEP: 0
COUGH: 1
SINUS PAIN: 1
HEADACHES: 1
RESPIRATORY PAIN: 1
SPUTUM PRODUCTION: 1
ALTERED TEMPERATURE REGULATION: 1
PARALYSIS: 0
ARTHRALGIAS: 1
MUSCLE WEAKNESS: 1
WEAKNESS: 1
NUMBNESS: 1
ORTHOPNEA: 1
HOARSE VOICE: 1
STIFFNESS: 1
POLYPHAGIA: 0
SPEECH CHANGE: 0
MEMORY LOSS: 1
CHILLS: 1
DYSPNEA ON EXERTION: 1
ABDOMINAL PAIN: 1
LEG PAIN: 1
PALPITATIONS: 1
POOR WOUND HEALING: 1
HYPOTENSION: 0
SNORES LOUDLY: 1
SMELL DISTURBANCE: 0
TASTE DISTURBANCE: 0
RECTAL PAIN: 0
DECREASED APPETITE: 1
EYE PAIN: 1
BLOOD IN STOOL: 1
NECK MASS: 1
HEMOPTYSIS: 0
TACHYCARDIA: 1
WEIGHT GAIN: 0
JOINT SWELLING: 1
MUSCLE CRAMPS: 1
JAUNDICE: 0
BOWEL INCONTINENCE: 0
HEMATURIA: 0
SKIN CHANGES: 1
TREMORS: 0
DIFFICULTY URINATING: 1
SYNCOPE: 0
DIZZINESS: 1
WHEEZING: 1
CONSTIPATION: 1

## 2017-07-10 ASSESSMENT — ACTIVITIES OF DAILY LIVING (ADL)
ARE_THERE_FIREARMS_IN_YOUR_HOME?: N
DO_MEMBERS_OF_YOUR_HOUSEHOLD_WEAR_SEAT_BELTS?: Y
DO_MEMBERS_OF_YOUR_HOUSEHOLD_USE_SAFETY_HELMETS?: Y
ARE_THERE_SMOKE_DETECTORS_IN_YOUR_HOME?: Y
ARE_THERE_CARBON_MONOXIDE_DETECTORS_IN_YOUR_HOME?: Y
DO_MEMBERS_OF_YOUR_HOUSEHOLD_USE_SUNSCREEN?: Y

## 2017-07-11 ENCOUNTER — TELEPHONE (OUTPATIENT)
Dept: ANESTHESIOLOGY | Facility: CLINIC | Age: 47
End: 2017-07-11

## 2017-07-11 NOTE — TELEPHONE ENCOUNTER
"Patient requested to be called by RN after calling into the call center. Patient had questions about her neurologist that she sees on the 26th of July but the neurology nurse had called her and will be taking a look at her chart concerning the questions she has. Patient stated while on the phone that she has had a headache that has lasted for a week and they are making her feel mor \"weird\". The is having numbness and tingling in her face, and more confusion with these migraines. Patient stated that she is taking Tylenol without any comfort. This RN suggested that she go to the ED since she is having increased neurological symptoms. Patient stated that she dies not like the ED but would consider going there if things got worse. Patient verbalized understanding of plan, and remained calm and pleasurable to take to throughout the conversation.   "

## 2017-07-17 ENCOUNTER — OFFICE VISIT (OUTPATIENT)
Dept: INTERNAL MEDICINE | Facility: CLINIC | Age: 47
End: 2017-07-17

## 2017-07-17 ENCOUNTER — THERAPY VISIT (OUTPATIENT)
Dept: PHYSICAL THERAPY | Facility: CLINIC | Age: 47
End: 2017-07-17
Payer: COMMERCIAL

## 2017-07-17 VITALS
OXYGEN SATURATION: 96 % | HEART RATE: 100 BPM | BODY MASS INDEX: 28.98 KG/M2 | RESPIRATION RATE: 20 BRPM | DIASTOLIC BLOOD PRESSURE: 85 MMHG | SYSTOLIC BLOOD PRESSURE: 132 MMHG | WEIGHT: 185 LBS

## 2017-07-17 DIAGNOSIS — J32.9 CHRONIC SINUSITIS, UNSPECIFIED LOCATION: ICD-10-CM

## 2017-07-17 DIAGNOSIS — N39.41 URGENCY INCONTINENCE: ICD-10-CM

## 2017-07-17 DIAGNOSIS — G43.909 MIGRAINE WITHOUT STATUS MIGRAINOSUS, NOT INTRACTABLE, UNSPECIFIED MIGRAINE TYPE: ICD-10-CM

## 2017-07-17 DIAGNOSIS — M96.1 POSTLAMINECTOMY SYNDROME, LUMBAR REGION: ICD-10-CM

## 2017-07-17 DIAGNOSIS — Z12.4 SCREENING FOR MALIGNANT NEOPLASM OF CERVIX: Primary | ICD-10-CM

## 2017-07-17 DIAGNOSIS — Q79.60 EHLERS-DANLOS SYNDROME: ICD-10-CM

## 2017-07-17 DIAGNOSIS — R00.0 TACHYCARDIA: ICD-10-CM

## 2017-07-17 PROCEDURE — 97530 THERAPEUTIC ACTIVITIES: CPT | Mod: GP | Performed by: PHYSICAL THERAPIST

## 2017-07-17 PROCEDURE — 97140 MANUAL THERAPY 1/> REGIONS: CPT | Mod: GP | Performed by: PHYSICAL THERAPIST

## 2017-07-17 PROCEDURE — 97110 THERAPEUTIC EXERCISES: CPT | Mod: GP | Performed by: PHYSICAL THERAPIST

## 2017-07-17 RX ORDER — ETODOLAC 400 MG
400 TABLET ORAL 2 TIMES DAILY
Status: ON HOLD | COMMUNITY
End: 2017-07-26

## 2017-07-17 RX ORDER — ONDANSETRON 4 MG/1
4-8 TABLET, ORALLY DISINTEGRATING ORAL EVERY 12 HOURS PRN
Qty: 60 TABLET | Refills: 1 | Status: SHIPPED | OUTPATIENT
Start: 2017-07-17 | End: 2017-09-15

## 2017-07-17 RX ORDER — RIZATRIPTAN BENZOATE 5 MG/1
5-10 TABLET, ORALLY DISINTEGRATING ORAL
Qty: 20 TABLET | Refills: 0 | Status: ON HOLD | OUTPATIENT
Start: 2017-07-17 | End: 2017-07-27

## 2017-07-17 ASSESSMENT — PAIN SCALES - GENERAL: PAINLEVEL: SEVERE PAIN (7)

## 2017-07-17 NOTE — PROGRESS NOTES
Ms. Welch is a 46 year old female here to establish care.    History of Present Illness:  Ada is a pleasant 46-year-old woman with a past medical history notable for Ehler's Danlos syndrome type III, migraines chronic back pain, tachycardia, urinary incontinence who is here to establish care after recently moving to the [facility] from [facility]. She is here with her  today. Overall she has no acute health concerns, however we did repeat review several chronic health issues:    Ehler's Danlos syndrome: Type 3/hypermobility type. 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 He reports having a Beighton score of 7-8 out of 9. She reports having genetic testing done, which was questionable for the MYLK Gene. She follows with a rheumatologist who is an EDS specialist in [place]. He is apparently concerned about some sort of Chiari malformation in the context of her head and neck pain and will be flying to [facility] to visit a special neurosurgeon Dr. Jesus Alberto Sofia. She also struggled with low back pain for many years. She underwent L4 to S1 fusion in 2013 followed by removal of instrumentation, which did not improve her symptoms. She met with Dr. Martinez in the pain clinic and was taken off high doses of Dilaudid and started on Suboxone, which has dramatically improved her symptoms.  She continues to work with physical therapy twice a week as well as other complementary therapies.    Headaches:She does have a history of migraine headaches, as well as a headache which started last week and has remained fairly constant. She reports it started in the back of her head bilaterally approximately 10 days ago. It has waxed and waned in intensity since this time.She has used only over-the-counter medications thus far. She has some associated nausea. She has no other neurologic changes acutely. He denies any recent changes in medications sleep stress activity or caffeine.    Other issues that she  struggles with include chronic tachycardia, for which she has a loop recorder implanted. She describes asthma as well as pulmonary nodules. She has a hiatal hernia as well as chronic sinus issues. She reports some autonomic issues. She apparently has a enlarged lymph node in the cervical region, which was biopsied and found to be benign, under active surveillance. She has chronic urge incontinence as well as incomplete bladder emptying.    Her primary care doctor was Dr. Rigo Dotson [facility].      A full 10-pt Review of Systems was performed, verified and is negative except as documented in the HPI.  All health questionnaires were reviewed, verified and relevant information documented above.    Past Medical History:  See above    Past Surgical History:  Past Surgical History:   Procedure Laterality Date     AS REPAIR OF NASAL SEPTUM       TONSILLECTOMY       TUBAL LIGATION         Active Meds:  Current Outpatient Prescriptions   Medication     etodolac (LODINE) 400 MG tablet     rizatriptan (MAXALT-MLT) 5 MG ODT tab     ondansetron (ZOFRAN-ODT) 4 MG ODT tab     buprenorphine (BUTRANS) 10 MCG/HR WK patch     ketamine, KETALAR, 5%-gabapentin, NEURONTIN, 8%-lidocaine 2.5% 5%-8% GEL topical PLO cream     tiZANidine (ZANAFLEX) 4 MG tablet     levothyroxine (SYNTHROID) 25 mcg/mL SUSP     omeprazole (PRILOSEC) 20 MG CR capsule     ondansetron (ZOFRAN-ODT) 4 MG ODT tab     lidocaine (LIDODERM) 5 % Patch     fluticasone-salmeterol (ADVAIR DISKUS) 250-50 MCG/DOSE diskus inhaler     cetirizine (ZYRTEC) 10 MG tablet     polyethylene glycol (MIRALAX/GLYCOLAX) powder     FLUTICASONE FUROATE NA     Multiple Vitamins-Minerals (MULTIVITAMIN PO)     Probiotic Product (PROBIOTIC DAILY PO)     No current facility-administered medications for this visit.         Allergies:  Bee venom; Chicken-derived products (egg); Lactose; and Wheat    Family History:  family history includes Connective Tissue Disorder in her  mother and sister.    Social History:  Social History   Substance Use Topics     Smoking status: Former Smoker     Packs/day: 15.00     Years: 0.25     Types: Other     Quit date: 7/17/2014     Smokeless tobacco: Not on file     Alcohol use No   she has 5 children, all born by vaginal deliveries  She is ,  She is not currently working    Physical Exam:  Vitals: /85 (BP Location: Right arm, Patient Position: Chair, Cuff Size: Adult Regular)  Pulse 100  Resp 20  Wt 83.9 kg (185 lb)  SpO2 96%  BMI 28.98 kg/m2  Constitutional: Alert, oriented, pleasant, no acute distress  Head: Normocephalic, atraumatic  Eyes: Extra-ocular movements intact, pupils equally round and reactivebilaterally, no scleral icterus  ENT: Oropharynx clear, moist mucus membranes, good dentition  Neck: Supple, no lymphadenopathy  Cardiovascular: Regular rate and rhythm, no murmurs, rubs or gallops, peripheral pulses full/symmetric  Respiratory: Good air movement bilaterally, lungs clear, no wheezes/rales/rhonchi  GI: Abdomen soft, bowel sounds present, nondistended, nontender, no organomegaly or masses, no rebound/guarding  Musculoskeletal: No edema, normal muscle tone, normal gait, no head/neck  Tenderness, ROM neck intact  Neurologic: Alert and oriented, cranial nerves 2-12 intact, nonfocal exam  Skin: No rashes/lesions  Psychiatric: normal mentation, affect and mood          Assessment and Plan:    Ada was seen today for establish care, headache and refill request. Ada as seen to establish care today. We spent the majority of the visit reviewing her numerous chronic health conditions,and she requested various referrals to specialists which she had been previously seen at home. I told her I would need to review her outside records to have a more comprehensive understanding of her healthcare issues and needs. For today we will prescribe her a triptan for her headaches She was advised to follow-up in 2-3 months or as needed  for continuing care coordination.    Diagnoses and all orders for this visit:    Screening for malignant neoplasm of cervix    Tom-Danlos syndrome  -     CARDIOLOGY EVAL ADULT REFERRAL    Migraine without status migrainosus, not intractable, unspecified migraine type  -     rizatriptan (MAXALT-MLT) 5 MG ODT tab; Take 1-2 tablets (5-10 mg) by mouth at onset of headache for migraine (max 30 mg in 24 hours)  -     ondansetron (ZOFRAN-ODT) 4 MG ODT tab; Take 1-2 tablets (4-8 mg) by mouth every 12 hours as needed for nausea    Urgency incontinence  -     UROLOGY ADULT REFERRAL    Tachycardia  -     CARDIOLOGY EVAL ADULT REFERRAL    Chronic sinusitis, unspecified location  -     OTOLARYNGOLOGY REFERRAL        #Routine Health Maintenence:*deferred today  Immunizations (zoster, pneumovax, flu, Tdap, Hep A/B):   There is no immunization history on file for this patient.  Lipids: No results for input(s): CHOL, HDL, LDL, TRIG, CHOLHDLRATIO in the last 28350 hours.  PSA (50-75 yrs): No results found for: PSA   AAA Screening (65-75 yrs):  Lung Ca Screening (>30 pk age 55-79 or >20 py age 50-79 + RF):  Colonoscopy (50-75 yrs):  Dexa (>65W or 70M yrs):  Mammogram (40-75 yrs):  Pap (21-65 yrs):  Pelvic/Breast:  GC/Chlam (<25 yrs):  HIV/HCV if risk factors:  Safety/Lifestyle:  Tob/EtOH:  Depression:  Advanced Directive:    Return to clinic: 2-3 months p.r.n.    Ellyn Rivera MD  Internal Medicine    45 min spent face to face, of which >50% time spent on counseling/coordinating care exclusive of any procedure time      Answers for HPI/ROS submitted by the patient on 7/10/2017   Annual Exam:  General Symptoms: Yes  Skin Symptoms: Yes  HENT Symptoms: Yes  EYE SYMPTOMS: Yes  HEART SYMPTOMS: Yes  LUNG SYMPTOMS: Yes  INTESTINAL SYMPTOMS: Yes  URINARY SYMPTOMS: Yes  GYNECOLOGIC SYMPTOMS: No  BREAST SYMPTOMS: No  SKELETAL SYMPTOMS: Yes  BLOOD SYMPTOMS: Yes  NERVOUS SYSTEM SYMPTOMS: Yes  MENTAL HEALTH SYMPTOMS: No  Fever: No  Loss of  appetite: Yes  Weight loss: No  Weight gain: No  Fatigue: Yes  Night sweats: No  Chills: Yes  Increased stress: No  Excessive hunger: No  Excessive thirst: No  Feeling hot or cold when others believe the temperature is normal: Yes  Loss of height: No  Post-operative complications: Yes  Surgical site pain: Yes  Hallucinations: No  Change in or Loss of Energy: No  Hyperactivity: No  Confusion: Yes  Changes in hair: Yes  Changes in moles/birth marks: Yes  Itching: Yes  Rashes: No  Changes in nails: No  Acne: No  Hair in places you don't want it: No  Change in facial hair: No  Warts: No  Non-healing sores: Yes  Scarring: Yes  Flaking of skin: No  Color changes of hands/feet in cold : Yes  Sun sensitivity: No  Skin thickening: No  Ear pain: No  Ear discharge: No  Hearing loss: No  Tinnitus: Yes  Nosebleeds: No  Congestion: Yes  Sinus pain: Yes  Trouble swallowing: Yes   Voice hoarseness: Yes  Mouth sores: No  Sore throat: No  Tooth pain: Yes  Gum tenderness: No  Bleeding gums: No  Change in taste: No  Change in sense of smell: No  Dry mouth: Yes  Hearing aid used: No  Neck lump: Yes  Eye pain: Yes  Vision loss: No  Dry eyes: Yes  Watery eyes: No  Eye bulging: No  Double vision: Yes  Flashing of lights: Yes  Spots: Yes  Floaters: Yes  Redness: Yes  Crossed eyes: No  Tunnel Vision: Yes  Yellowing of eyes: No  Eye irritation: Yes  Cough: Yes  Sputum or phlegm: Yes  Coughing up blood: No  Difficulty breating or shortness of breath: Yes  Snoring: Yes  Wheezing: Yes  Difficulty breathing on exertion: Yes  Respiratory pain: Yes  Nighttime Cough: No  Difficulty breathing when lying flat: Yes  Chest pain or pressure: Yes  Fast or irregular heartbeat: Yes  Pain in legs with walking: Yes  Swelling in feet or ankles: Yes  Trouble breathing while lying down: Yes  Fingers or Toes appear blue: Yes  High blood pressure: No  Low blood pressure: No  Fainting: No  Murmurs: No  Chest pain on exertion: No  Chest pain at rest: No  Cramping  pain in leg during exercise: Yes  Pacemaker: No  Varicose veins: No  Edema or swelling: Yes  Fast heart beat: Yes  Wake up at night with shortness of breath: No  Heart flutters: No  Light-headedness: Yes  Exercise intolerance: Yes  Heart burn or indigestion: No  Nausea: Yes  Vomiting: Yes  Abdominal pain: Yes  Bloating: Yes  Constipation: Yes  Diarrhea: Yes  Blood in stool: Yes  Black stools: No  Rectal or Anal pain: No  Fecal incontinence: No  Rectal bleeding: Yes  Yellowing of skin or eyes: No  Vomit with blood: No  Change in stools: No  Hemorrhoids: Yes  Trouble holding urine or incontinence: Yes  Pain or burning: Yes  Trouble starting or stopping: Yes  Increased frequency of urination: Yes  Blood in urine: No  Decreased frequency of urination: No  Frequent nighttime urination: Yes  Flank pain: Yes  Difficulty emptying bladder: Yes  Back pain: Yes  Muscle aches: Yes  Neck pain: Yes  Swollen joints: Yes  Joint pain: Yes  Bone pain: Yes  Muscle cramps: Yes  Muscle weakness: Yes  Joint stiffness: Yes  Bone fracture: No  Anemia: No  Swollen glands: Yes  Easy bleeding or bruising: Yes  Trouble with coordination: Yes  Dizziness or trouble with balance: Yes  Fainting or black-out spells: Yes  Memory loss: Yes  Headache: Yes  Seizures: No  Speech problems: No  Tingling: Yes  Tremor: No  Weakness: Yes  Difficulty walking: Yes  Paralysis: No  Numbness: Yes  Frequency of exercise:: 4-5 days/week  Duration of exercise:: 15-30 minutes

## 2017-07-17 NOTE — NURSING NOTE
Chief Complaint   Patient presents with     Establish Care     establish care/provider-no physical     Headache     migraine headaches     Refill Request     regla Varner LPN 1:41 PM on 7/17/2017

## 2017-07-17 NOTE — PATIENT INSTRUCTIONS
[facility] Medication Refill Request Information:  * Please contact your pharmacy regarding ANY request for medication refills.  ** [facility] Prescription Fax = [phone]  * Please allow 3 business days for routine medication refills.  * Please allow 5 business days for controlled substance medication refills.     [facility] Test Result notification information:  *You will be notified with in 7-10 days of your appointment day regarding the results of your test.  If you are on MyChart you will be notified as soon as the provider has reviewed the results and signed off on them.    [facility] [phone]     Cardiovascular [phone] ([facility]rd [facility])   Urology [phone] ([facility] 4th [facility] N)  [phone] ([facility]th [facility])

## 2017-07-17 NOTE — MR AVS SNAPSHOT
After Visit Summary   7/17/2017    Ada Welch    MRN: [id number]           Patient Information     Date Of Birth          1970        Visit Information        Provider Department      7/17/2017 1:10 PM Ellyn Rivera MD [facility]        Today's Diagnoses     Screening for malignant neoplasm of cervix    -  1    Tom-Danlos syndrome        Migraine without status migrainosus, not intractable, unspecified migraine type        Urgency incontinence        Tachycardia        Chronic sinusitis, unspecified location          Care Instructions    Primary Care Center Medication Refill Request Information:  * Please contact your pharmacy regarding ANY request for medication refills.  ** [facility] Prescription Fax = [phone]  * Please allow 3 business days for routine medication refills.  * Please allow 5 business days for controlled substance medication refills.     Primary Care Center Test Result notification information:  *You will be notified with in 7-10 days of your appointment day regarding the results of your test.  If you are on MyChart you will be notified as soon as the provider has reviewed the results and signed off on them.    Primary Care Center [phone]     Cardiovascular [phone] ([facility], 3rd Floor S)   Urology [phone] ([facility], 4th Floor N)  [phone] ([facility], 4th Floor)                     Follow-ups after your visit        Additional Services     CARDIOLOGY EVAL ADULT REFERRAL       Your provider has referred you to:  P: [facility] [phone]   [email or web address]: [facility] [phone]   [email or web address]    Please be aware that coverage of these services is subject to the terms and limitations of  your health insurance plan.  Call member services at your health plan with any benefit or coverage questions.      Type of Referral:  New Cardiology Consult    Timeframe requested:  Within 1 month    Please bring the following to your appointment:  >>   Any x-rays, CTs or MRIs which have been performed.  Contact the facility where they were done to arrange for  prior to your scheduled appointment.    >>   List of current medications  >>   This referral request   >>   Any documents/labs given to you for this referral            OTOLARYNGOLOGY REFERRAL       Your provider has referred you to: [facility] (Otolaryngology) - [place] [phone]  [email or web address]    Please be aware that coverage of these services is subject to the terms and limitations of your health insurance plan.  Call member services at your health plan with any benefit or coverage questions.      Please bring the following with you to your appointment:    (1) Any X-Rays, CTs or MRIs which have been performed.  Contact the facility where they were done to arrange for  prior to your scheduled appointment.   (2) List of current medications  (3) This referral request   (4) Any documents/labs given to you for this referral            UROLOGY ADULT REFERRAL       Your provider has referred you to: [facility]: [place] [facility] - [place] [phone]   [email or web address]    Please be aware that coverage of these services is subject to the terms and limitations of your health insurance plan.  Call member services at your health plan with any benefit or coverage questions.      Please bring the following with you to your appointment:    (1) Any X-Rays, CTs or MRIs which have been performed.  Contact the facility where they were done to arrange for  prior to your scheduled  appointment.    (2) List of current medications  (3) This referral request   (4) Any documents/labs given to you for this referral                  Follow-up notes from your care team     Return in about 2 months (around 9/17/2017), or if symptoms worsen or fail to improve, for 60 min appt.      Your next 10 appointments already scheduled     Jul 19, 2017  3:30 PM CDT   [facility] with Herb Luevano PT   [facility] [place] [facility] ([facility] [place])    [address] [place] [address]  [address]  [place] [address]   [phone]            Jul 26, 2017  3:00 PM CDT   New Patient Visit with Higinio Landers MD   [facility] ([facility])    [address], [address]  [facility] [place] [facility] [id number] [phone]            Aug 07, 2017  3:40 PM CDT   (Arrive by 3:25 PM)   Return Visit with Faiza Martinez MD   [facility] ([facility] [place])    [address]  [address]th [facility] [id number] [phone]            Sep 12, 2017  9:05 AM CDT   (Arrive by 8:50 AM)   New Patient Visit with Jerad Hebert MD   [facility] ([facility] [place])    [address]  2nd Floor  [facility] [id number] [phone]            Sep 21, 2017 11:25 AM CDT   (Arrive by 11:10 AM)   Return Visit with Ellyn Rivera MD   [facility] ([facility] [place])    [address]  4th Floor  [facility] [id number] [phone]              Who to contact     Please call your clinic at [phone] to:    Ask questions about your health    Make or cancel appointments    Discuss your medicines    Learn about your test results    Speak to your doctor   If you have compliments or concerns about an experience at your clinic, or if you wish to file a complaint, please contact [facility] Patient Relations at [phone] or email us at [email or web address]         Additional Information About Your Visit        [employer] Information     [employer] gives you secure access to your electronic health record. If you see a primary care provider, you can also send messages to your care team and make appointments. If you have questions, please call your primary care clinic.  If you do not have a primary care provider, please call [phone] and they will assist you.      [employer] is an electronic gateway that provides easy, online access to your medical records. With [employer], you can request a clinic appointment, read your test results, renew a prescription or communicate with your care team.     To access your existing account, please contact your [facility] or call [phone] for assistance.        Care EveryWhere ID     This is your Care EveryWhere ID. This could be used by other organizations to access your [facility] medical records  [id number]        Your Vitals Were     Pulse Respirations Pulse Oximetry BMI (Body Mass Index)          100 20 96% 28.98 kg/m2         Blood Pressure from Last 3 Encounters:   07/17/17 132/85   06/12/17 129/86   03/20/17 (!) 140/91    Weight from Last 3 Encounters:   07/17/17 83.9 kg (185 lb)   06/12/17 83.9 kg (185 lb)   03/20/17 83.9 kg (185 lb)              We Performed the Following     CARDIOLOGY EVAL ADULT REFERRAL     OTOLARYNGOLOGY REFERRAL     UROLOGY ADULT REFERRAL          Today's Medication Changes          These changes are accurate as of: 7/17/17 11:59 PM.  If you have any questions, ask your nurse or doctor.               Start taking these medicines.        Dose/Directions    rizatriptan 5 MG ODT tab   Commonly known as:  MAXALT-MLT   Used for:  Migraine without status migrainosus, not intractable, unspecified migraine type   Started by:  Ellyn Rivera MD        Dose:  5-10 mg   Take 1-2 tablets  (5-10 mg) by mouth at onset of headache for migraine (max 30 mg in 24 hours)   Quantity:  20 tablet   Refills:  0         These medicines have changed or have updated prescriptions.        Dose/Directions    * ondansetron 4 MG ODT tab   Commonly known as:  ZOFRAN-ODT   This may have changed:  Another medication with the same name was added. Make sure you understand how and when to take each.   Changed by:  Faiza Martinez MD        Dose:  4 mg   4 mg every 12 hours as needed   Refills:  0       * ondansetron 4 MG ODT tab   Commonly known as:  ZOFRAN-ODT   This may have changed:  You were already taking a medication with the same name, and this prescription was added. Make sure you understand how and when to take each.   Used for:  Migraine without status migrainosus, not intractable, unspecified migraine type   Changed by:  Ellyn Rivera MD        Dose:  4-8 mg   Take 1-2 tablets (4-8 mg) by mouth every 12 hours as needed for nausea   Quantity:  60 tablet   Refills:  1       * Notice:  This list has 2 medication(s) that are the same as other medications prescribed for you. Read the directions carefully, and ask your doctor or other care provider to review them with you.      Stop taking these medicines if you haven't already. Please contact your care team if you have questions.     nabumetone 750 MG tablet   Commonly known as:  RELAFEN   Stopped by:  Ellyn Rivera MD                Where to get your medicines      These medications were sent to [place] [address] [facility] [address]  [address] [facility] [address]     Phone:  [phone]     ondansetron 4 MG ODT tab    rizatriptan 5 MG ODT tab                Primary Care Provider    None Specified       No primary provider on file.        Equal Access to Services     [facility]: Hadchantel Slater, warejida luagustín, qaybta kaalodette harrison . [facility] [phone].    ATENCIÓN:  Si habla jimmie, tiene a sweeney disposición servicios gratuitos de asistencia lingüística. Isaías carr [phone].    We comply with applicable federal civil rights laws and [place] laws. We do not discriminate on the basis of race, color, national origin, age, disability sex, sexual orientation or gender identity.            Thank you!     Thank you for choosing [facility]  for your care. Our goal is always to provide you with excellent care. Hearing back from our patients is one way we can continue to improve our services. Please take a few minutes to complete the written survey that you may receive in the mail after your visit with us. Thank you!             Your Updated Medication List - Protect others around you: Learn how to safely use, store and throw away your medicines at www.disposemymeds.org.          This list is accurate as of: 7/17/17 11:59 PM.  Always use your most recent med list.                   Brand Name Dispense Instructions for use Diagnosis    ADVAIR DISKUS 250-50 MCG/DOSE diskus inhaler   Generic drug:  fluticasone-salmeterol           buprenorphine 10 MCG/HR WK patch    BUTRANS    4 patch    Place 1 patch onto the skin every 7 days    Postlaminectomy syndrome of lumbar region       cetirizine 10 MG tablet    zyrTEC          etodolac 400 MG tablet    LODINE     Take 400 mg by mouth 2 times daily        FLUTICASONE FUROATE NA      Spray 50 mcg in nostril At Bedtime        ketamine (KETALAR) 5%-gabapentin (NEURONTIN) 8%-lidocaine 2.5% 5%-8% Gel topical PLO cream     30 g    Apply 1 g topically 3 times daily as needed    Postlaminectomy syndrome of lumbosacral region, Postlaminectomy syndrome of lumbar region       levothyroxine 25 mcg/mL Susp    SYNTHROID     25 mcg every morning        lidocaine 5 % Patch    LIDODERM    30 patch    Apply up to 3 patches to painful area at once for up to 12 h within a 24 h period.  Remove after 12 hours.    Post laminectomy syndrome        MULTIVITAMIN PO      Take by mouth daily        omeprazole 20 MG CR capsule    priLOSEC     20 mg 2 times daily        * ondansetron 4 MG ODT tab    ZOFRAN-ODT     4 mg every 12 hours as needed        * ondansetron 4 MG ODT tab    ZOFRAN-ODT    60 tablet    Take 1-2 tablets (4-8 mg) by mouth every 12 hours as needed for nausea    Migraine without status migrainosus, not intractable, unspecified migraine type       polyethylene glycol powder    MIRALAX/GLYCOLAX          PROBIOTIC DAILY PO      Take by mouth 2 times daily        rizatriptan 5 MG ODT tab    MAXALT-MLT    20 tablet    Take 1-2 tablets (5-10 mg) by mouth at onset of headache for migraine (max 30 mg in 24 hours)    Migraine without status migrainosus, not intractable, unspecified migraine type       tiZANidine 4 MG tablet    ZANAFLEX    90 tablet    Take 2 tablets (8 mg) by mouth 3 times daily as needed for muscle spasms    Post laminectomy syndrome       * Notice:  This list has 2 medication(s) that are the same as other medications prescribed for you. Read the directions carefully, and ask your doctor or other care provider to review them with you.

## 2017-07-19 ENCOUNTER — THERAPY VISIT (OUTPATIENT)
Dept: PHYSICAL THERAPY | Facility: CLINIC | Age: 47
End: 2017-07-19
Payer: COMMERCIAL

## 2017-07-19 ENCOUNTER — PRE VISIT (OUTPATIENT)
Dept: UROLOGY | Facility: CLINIC | Age: 47
End: 2017-07-19

## 2017-07-19 ENCOUNTER — MYC MEDICAL ADVICE (OUTPATIENT)
Dept: INTERNAL MEDICINE | Facility: CLINIC | Age: 47
End: 2017-07-19

## 2017-07-19 DIAGNOSIS — M96.1 POSTLAMINECTOMY SYNDROME, LUMBAR REGION: ICD-10-CM

## 2017-07-19 PROCEDURE — 97530 THERAPEUTIC ACTIVITIES: CPT | Mod: GP | Performed by: PHYSICAL THERAPIST

## 2017-07-19 NOTE — PROGRESS NOTES
PROGRESS  REPORT    Progress reporting period is from 6/14/17 to 7/19/17.     Orders: 6/13/17 Faiza Martinez MD (eval and treat chronic neck/back pain)  M96.1 Postlaminectomy syndrome of lumbosacral region    M96.1 Postlaminectomy syndrome of lumbar region    Therapist Impression:  Since starting therapy, Ada reports becoming significantly worse in her function and ability to compete her ADLs. Her Oswestry is worse at 68% disability as well as her NDI score at 82% disability. She feels her diagnosis of Tom Danlos Syndrome is causing of variety of neurologic symptoms (headaches, dizziness, fatigue, chronic pain). She reports she is unable to perform her HEP due to the exercises making her headache and pain symptoms significantly worse; any type of physical activity makes her pain and neurologic symptoms worse. Her pain patterns are non-organic in nature and do not follow an expected anatomical pattern. She has a 4/5 on Mindy's signs for non-organic pain. Ada reports she cannot tolerate any active treatments in therapy, and would like passive manual therapy techniques. Her affect is depressive (frequently becoming emotional in therapy) during therapy and she continually reports a variety of psychosocial stressors as well as fear-avoidance behavior about physical activity due to her EDS. 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 PT had a long discussion with Ada this date about her medical status. We are in agreement she doesn't have the capacity to participate in physical therapy at this point. PT recommends follow-up with neurology for further consultation on medical management. PT educated Ada to follow-up once she feels she is ready to participate in active physical therapy interventions.   SUBJECTIVE    Global Perceived Rating of Change: (unable to get  "number from patient reports she is worse since starting therapy  Oswestry: 68% disability   Neck Disability Index: 82% disability         OBJECTIVE    Strength assessment:    Strength testing (0-5 MMT scale) L R   hip flexion 3+ 3+   Hip Abduction 3+ 3+   Hip ABD/ER 4 4   Hip Extension 3+ 3+   Knee Extension     Knee Flexion     Upper Abdominal crunch      Lower Abdominal BILAT SLR lower (angle measured from 90 degrees hip flexion)       Cervical ROM: 80 degrees rotation with dizziness and blurry vision throughout motion    Affect: emotional and depressive     Behavior: displaying signification FoB behavior about exercise in physical activity due to her EDS diagnosis     Van's Signs of Non-Organic Pain: (P=postitive, N=negative)    + Tenderness tests: superficial and diffuse tenderness and/or nonanatomic tenderness  + Simulation tests: Axial loading of spine  NT Distraction tests: 90/90 SLR in sitting and supine SLR  +Regional disturbances: regional weakness or sensory changes which deviate from accepted neuroanatomy.  + Overreaction: subjective signs regarding the patient's demeanor and reaction to testing    Score: 4/5    Any individual sign marks its category as positive. When three or more categories were positive, the finding was considered clinically significant.     (Kenrick Guillen; Higinio Burger; José Miguel Kolb; Brendon Rosas (March-April 1980). \"Nonorganic Physical Signs in Low-Back Pain\". Spine. 5 (2): 117-125.)        ASSESSMENT/PLAN  Updated problem list and treatment plan: Diagnosis 1:  EDS pain syndrome  Pain -  manual therapy, splint/taping/bracing/orthotics, self management, education, directional preference exercise and home program  Decreased ROM/flexibility - manual therapy, therapeutic exercise, therapeutic activity and home program  Decreased strength - therapeutic exercise, therapeutic activities and home program  Impaired muscle performance - neuro re-education and home program  Decreased " function - therapeutic activities and home program  STG/LTGs have been met or progress has been made towards goals:  Yes (See Goal flow sheet completed today.)  Assessment of Progress: The patient is no longer making progress in all 3 of the following areas: subjectively, objectively and functionally.  The patient's progress has slowed.  The patient has had set backs in their progress.  The patient's condition has exacerbated.  Self Management Plans:  Patient has been instructed in a home treatment program.  Patient  has been instructed in self management of symptoms.  I have re-evaluated this patient and find that the nature, scope, duration and intensity of the therapy is appropriate for the medical condition of the patient.  Ada continues to require the following intervention to meet STG and LTG's:  PT    Recommendations:  This patient would benefit from continued therapy.     Frequency:  1-2 X week, once able to participate in active therapy (hold therapy for now)  Duration:  for 6-8 visits and reassessment of progress           Please refer to the daily flowsheet for treatment today, total treatment time and time spent performing 1:1 timed codes.

## 2017-07-19 NOTE — MR AVS SNAPSHOT
After Visit Summary   7/19/2017    Ada Welch    MRN: [id number]           Patient Information     Date Of Birth          1970        Visit Information        Provider Department      7/19/2017 2:50 PM Herb Luevano PT [place] [facility] Physical[facility]rapy        Today's Diagnoses     Postlaminectomy syndrome, lumbar region           Follow-ups after your visit        Your next 10 appointments already scheduled     Jul 26, 2017  3:00 PM CDT   New Patient Visit with Higinio Landers MD   [facility] ([facility])    [address], [address]  [facility] [address]   [phone]            Aug 07, 2017  3:40 PM CDT   (Arrive by 3:25 PM)   Return Visit with Faiza Martinez MD   [facility] ([facility])    [address] [place] [address]  4th [facility] [address]   [phone]            Aug 30, 2017 11:00 AM CDT   (Arrive by 10:45 AM)   NEW FEMALE PELVIC with Wade Singh MD   [facility] ([facility])    [address] [place] [address]  4th [facility] [address]   [phone]            Aug 30, 2017  1:30 PM CDT   (Arrive by 1:15 PM)   New Patient Visit with Rey Tello MD   [facility] ([facility])    [address] [place] [address]  3rd Floor  [facility] [address]   [phone]            Aug 30, 2017  3:00 PM CDT   (Arrive by 2:45 PM)   New Patient Visit with Zoe Barahona MD   [facility] Ear Nose and Throat ([facility])    [address] [place] [address]  4th [facility] [address]   [phone]            Sep 12, 2017  9:05 AM CDT   (Arrive by 8:50 AM)   New Patient Visit with Jerad Hebert MD   [facility] ([facility])    [address] [place] [address]  [facility] [address]   [phone]            Sep 21, 2017 11:25 AM CDT   (Arrive by 11:10 AM)   Return Visit with Ellyn Rivera MD   [facility] ([facility])    [address]  4th [facility] [address] [phone]              Who to contact     If you have questions or need follow up information about today's clinic visit or your schedule please contact [facility] directly at [phone].  Normal or non-critical lab and imaging results will be communicated to you by Apigeehart, letter or phone within 4 business days after the clinic has received the results. If you do not hear from us within 7 days, please contact the clinic through Apigeehart or phone. If you have a critical or abnormal lab result, we will notify you by phone as soon as possible.  Submit refill requests through Marblar or call your pharmacy and they will forward the refill request to us. Please allow 3 business days for your refill to be completed.          Additional Information About Your Visit        MyChart Information     Marblar gives you secure access to your electronic health record. If you see a primary care provider, you can also send messages to your care team and make appointments. If you have questions, please call your primary care clinic.  If you do not have a primary care provider, please call [phone] and they will assist you.        Care EveryWhere ID     This is your Care EveryWhere ID. This could be used by other organizations to access your [facility] medical records  [id number]         Blood Pressure from Last 3 Encounters:   07/17/17 132/85   06/12/17 129/86   03/20/17 (!) 140/91    Weight from Last 3 Encounters:   07/17/17 83.9 kg (185 lb)   06/12/17 83.9 kg (185 lb)   03/20/17 83.9 kg (185 lb)              We Performed the Following     DARIA PROGRESS NOTES REPORT     THERAPEUTIC ACTIVITIES         Primary Care Provider    None Specified       No primary provider on file.        Equal Access to Services     CHRISTINA GUY : Hadii aad ku hadkokochela Slater, warejidonnell oakes, evitaquincy brucemaodette devineisamargrant tang. So [facility] [phone].    ATENCIÓN: Si habla español, tiene a sweeney disposición servicios gratuitos de asistencia lingüística. [facility] [phone].    We comply with applicable federal civil rights laws and [place] laws. We do not discriminate on the basis of race, color, national origin, age, disability sex, sexual orientation or gender identity.            Thank you!     Thank you for choosing [facility]  for your care. Our goal is always to provide you with excellent care. Hearing back from our patients is one way we can continue to improve our services. Please take a few minutes to complete the written survey that you may receive in the mail after your visit with us. Thank you!             Your Updated Medication List - Protect others around you: Learn how to safely use, store and throw away your medicines at www.disposemymeds.org.          This list is accurate as of: 7/19/17 11:59 PM.  Always use your most recent med list.                   Brand Name Dispense Instructions for use Diagnosis    ADVAIR DISKUS 250-50 MCG/DOSE diskus inhaler   Generic drug:  fluticasone-salmeterol           buprenorphine 10 MCG/HR WK patch    BUTRANS    4 patch    Place 1 patch onto the skin every 7 days    Postlaminectomy syndrome of lumbar region       cetirizine 10 MG tablet    zyrTEC          etodolac 400 MG tablet    LODINE     Take 400 mg by mouth 2 times daily        FLUTICASONE FUROATE NA      Spray 50 mcg in nostril At Bedtime        ketamine (KETALAR) 5%-gabapentin (NEURONTIN) 8%-lidocaine 2.5% 5%-8% Gel topical PLO cream     30 g    Apply 1 g topically 3 times daily as needed    Postlaminectomy syndrome of lumbosacral  region, Postlaminectomy syndrome of lumbar region       levothyroxine 25 mcg/mL Susp    SYNTHROID     25 mcg every morning        lidocaine 5 % Patch    LIDODERM    30 patch    Apply up to 3 patches to painful area at once for up to 12 h within a 24 h period.  Remove after 12 hours.    Post laminectomy syndrome       MULTIVITAMIN PO      Take by mouth daily        omeprazole 20 MG CR capsule    priLOSEC     20 mg 2 times daily        * ondansetron 4 MG ODT tab    ZOFRAN-ODT     4 mg every 12 hours as needed        * ondansetron 4 MG ODT tab    ZOFRAN-ODT    60 tablet    Take 1-2 tablets (4-8 mg) by mouth every 12 hours as needed for nausea    Migraine without status migrainosus, not intractable, unspecified migraine type       polyethylene glycol powder    MIRALAX/GLYCOLAX          PROBIOTIC DAILY PO      Take by mouth 2 times daily        rizatriptan 5 MG ODT tab    MAXALT-MLT    20 tablet    Take 1-2 tablets (5-10 mg) by mouth at onset of headache for migraine (max 30 mg in 24 hours)    Migraine without status migrainosus, not intractable, unspecified migraine type       tiZANidine 4 MG tablet    ZANAFLEX    90 tablet    Take 2 tablets (8 mg) by mouth 3 times daily as needed for muscle spasms    Post laminectomy syndrome       * Notice:  This list has 2 medication(s) that are the same as other medications prescribed for you. Read the directions carefully, and ask your doctor or other care provider to review them with you.

## 2017-07-24 ENCOUNTER — APPOINTMENT (OUTPATIENT)
Dept: MRI IMAGING | Facility: CLINIC | Age: 47
End: 2017-07-24
Attending: FAMILY MEDICINE
Payer: COMMERCIAL

## 2017-07-24 ENCOUNTER — HOSPITAL ENCOUNTER (OUTPATIENT)
Facility: CLINIC | Age: 47
Setting detail: OBSERVATION
Discharge: HOME | End: 2017-07-27
Attending: FAMILY MEDICINE | Admitting: PSYCHIATRY & NEUROLOGY
Payer: COMMERCIAL

## 2017-07-24 DIAGNOSIS — Q79.60 EHLERS-DANLOS SYNDROME: ICD-10-CM

## 2017-07-24 DIAGNOSIS — M96.1 POSTLAMINECTOMY SYNDROME, LUMBAR REGION: ICD-10-CM

## 2017-07-24 DIAGNOSIS — R51.9 INTRACTABLE HEADACHE, UNSPECIFIED CHRONICITY PATTERN, UNSPECIFIED HEADACHE TYPE: ICD-10-CM

## 2017-07-24 DIAGNOSIS — R20.9 DISTURBANCE OF SKIN SENSATION: ICD-10-CM

## 2017-07-24 DIAGNOSIS — G89.4 PAIN SYNDROME, CHRONIC: ICD-10-CM

## 2017-07-24 DIAGNOSIS — G43.909 MIGRAINE WITHOUT STATUS MIGRAINOSUS, NOT INTRACTABLE, UNSPECIFIED MIGRAINE TYPE: ICD-10-CM

## 2017-07-24 LAB
ALBUMIN SERPL-MCNC: 4.1 G/DL (ref 3.4–5)
ALP SERPL-CCNC: 148 U/L (ref 40–150)
ALT SERPL W P-5'-P-CCNC: 56 U/L (ref 0–50)
ANION GAP SERPL CALCULATED.3IONS-SCNC: 5 MMOL/L (ref 3–14)
AST SERPL W P-5'-P-CCNC: 36 U/L (ref 0–45)
BASOPHILS # BLD AUTO: 0 10E9/L (ref 0–0.2)
BASOPHILS NFR BLD AUTO: 0 %
BILIRUB SERPL-MCNC: 0.6 MG/DL (ref 0.2–1.3)
BUN SERPL-MCNC: 8 MG/DL (ref 7–30)
CALCIUM SERPL-MCNC: 8.9 MG/DL (ref 8.5–10.1)
CHLORIDE SERPL-SCNC: 110 MMOL/L (ref 94–109)
CO2 SERPL-SCNC: 27 MMOL/L (ref 20–32)
CREAT SERPL-MCNC: 0.85 MG/DL (ref 0.52–1.04)
CRP SERPL-MCNC: <2.9 MG/L (ref 0–8)
DIFFERENTIAL METHOD BLD: NORMAL
EOSINOPHIL # BLD AUTO: 0.2 10E9/L (ref 0–0.7)
EOSINOPHIL NFR BLD AUTO: 4 %
ERYTHROCYTE [DISTWIDTH] IN BLOOD BY AUTOMATED COUNT: 12.6 % (ref 10–15)
ERYTHROCYTE [SEDIMENTATION RATE] IN BLOOD BY WESTERGREN METHOD: 10 MM/H (ref 0–20)
GFR SERPL CREATININE-BSD FRML MDRD: 72 ML/MIN/1.7M2
GLUCOSE SERPL-MCNC: 88 MG/DL (ref 70–99)
HCT VFR BLD AUTO: 39.5 % (ref 35–47)
HGB BLD-MCNC: 13.5 G/DL (ref 11.7–15.7)
IMM GRANULOCYTES # BLD: 0 10E9/L (ref 0–0.4)
IMM GRANULOCYTES NFR BLD: 0 %
LYMPHOCYTES # BLD AUTO: 1.7 10E9/L (ref 0.8–5.3)
LYMPHOCYTES NFR BLD AUTO: 29 %
MCH RBC QN AUTO: 30.6 PG (ref 26.5–33)
MCHC RBC AUTO-ENTMCNC: 34.2 G/DL (ref 31.5–36.5)
MCV RBC AUTO: 90 FL (ref 78–100)
MONOCYTES # BLD AUTO: 0.3 10E9/L (ref 0–1.3)
MONOCYTES NFR BLD AUTO: 5.1 %
NEUTROPHILS # BLD AUTO: 3.5 10E9/L (ref 1.6–8.3)
NEUTROPHILS NFR BLD AUTO: 61.9 %
NRBC # BLD AUTO: 0 10*3/UL
NRBC BLD AUTO-RTO: 0 /100
PLATELET # BLD AUTO: 238 10E9/L (ref 150–450)
POTASSIUM SERPL-SCNC: 4 MMOL/L (ref 3.4–5.3)
PROT SERPL-MCNC: 7.4 G/DL (ref 6.8–8.8)
RBC # BLD AUTO: 4.41 10E12/L (ref 3.8–5.2)
SODIUM SERPL-SCNC: 141 MMOL/L (ref 133–144)
WBC # BLD AUTO: 5.7 10E9/L (ref 4–11)

## 2017-07-24 PROCEDURE — 72156 MRI NECK SPINE W/O & W/DYE: CPT

## 2017-07-24 PROCEDURE — 25000128 H RX IP 250 OP 636: Performed by: FAMILY MEDICINE

## 2017-07-24 PROCEDURE — 99285 EMERGENCY DEPT VISIT HI MDM: CPT | Mod: 25 | Performed by: FAMILY MEDICINE

## 2017-07-24 PROCEDURE — 96375 TX/PRO/DX INJ NEW DRUG ADDON: CPT | Performed by: FAMILY MEDICINE

## 2017-07-24 PROCEDURE — 96361 HYDRATE IV INFUSION ADD-ON: CPT | Performed by: FAMILY MEDICINE

## 2017-07-24 PROCEDURE — 25000125 ZZHC RX 250: Performed by: FAMILY MEDICINE

## 2017-07-24 PROCEDURE — 96374 THER/PROPH/DIAG INJ IV PUSH: CPT | Performed by: FAMILY MEDICINE

## 2017-07-24 PROCEDURE — 85652 RBC SED RATE AUTOMATED: CPT | Performed by: EMERGENCY MEDICINE

## 2017-07-24 PROCEDURE — 70546 MR ANGIOGRAPH HEAD W/O&W/DYE: CPT

## 2017-07-24 PROCEDURE — 80053 COMPREHEN METABOLIC PANEL: CPT | Performed by: EMERGENCY MEDICINE

## 2017-07-24 PROCEDURE — 70549 MR ANGIOGRAPH NECK W/O&W/DYE: CPT

## 2017-07-24 PROCEDURE — 85652 RBC SED RATE AUTOMATED: CPT | Performed by: FAMILY MEDICINE

## 2017-07-24 PROCEDURE — 70553 MRI BRAIN STEM W/O & W/DYE: CPT

## 2017-07-24 PROCEDURE — 86140 C-REACTIVE PROTEIN: CPT | Performed by: EMERGENCY MEDICINE

## 2017-07-24 PROCEDURE — 85025 COMPLETE CBC W/AUTO DIFF WBC: CPT | Performed by: EMERGENCY MEDICINE

## 2017-07-24 PROCEDURE — 99284 EMERGENCY DEPT VISIT MOD MDM: CPT | Mod: Z6 | Performed by: FAMILY MEDICINE

## 2017-07-24 PROCEDURE — A9585 GADOBUTROL INJECTION: HCPCS | Performed by: FAMILY MEDICINE

## 2017-07-24 RX ORDER — HYDROMORPHONE HYDROCHLORIDE 1 MG/ML
0.5 INJECTION, SOLUTION INTRAMUSCULAR; INTRAVENOUS; SUBCUTANEOUS ONCE
Status: COMPLETED | OUTPATIENT
Start: 2017-07-24 | End: 2017-07-24

## 2017-07-24 RX ORDER — KETOROLAC TROMETHAMINE 30 MG/ML
30 INJECTION, SOLUTION INTRAMUSCULAR; INTRAVENOUS ONCE
Status: COMPLETED | OUTPATIENT
Start: 2017-07-24 | End: 2017-07-24

## 2017-07-24 RX ORDER — MAGNESIUM SULFATE HEPTAHYDRATE 40 MG/ML
4 INJECTION, SOLUTION INTRAVENOUS ONCE
Status: COMPLETED | OUTPATIENT
Start: 2017-07-25 | End: 2017-07-25

## 2017-07-24 RX ORDER — METOCLOPRAMIDE HYDROCHLORIDE 5 MG/ML
5 INJECTION INTRAMUSCULAR; INTRAVENOUS ONCE
Status: COMPLETED | OUTPATIENT
Start: 2017-07-24 | End: 2017-07-24

## 2017-07-24 RX ORDER — SODIUM CHLORIDE 9 MG/ML
1000 INJECTION, SOLUTION INTRAVENOUS CONTINUOUS
Status: DISCONTINUED | OUTPATIENT
Start: 2017-07-24 | End: 2017-07-27 | Stop reason: HOSPADM

## 2017-07-24 RX ORDER — GABAPENTIN 300 MG/1
300 CAPSULE ORAL ONCE
Status: DISCONTINUED | OUTPATIENT
Start: 2017-07-24 | End: 2017-07-27 | Stop reason: CLARIF

## 2017-07-24 RX ORDER — GADOBUTROL 604.72 MG/ML
7.5 INJECTION INTRAVENOUS ONCE
Status: COMPLETED | OUTPATIENT
Start: 2017-07-24 | End: 2017-07-24

## 2017-07-24 RX ORDER — GABAPENTIN 600 MG/1
300 TABLET ORAL ONCE
Status: DISCONTINUED | OUTPATIENT
Start: 2017-07-24 | End: 2017-07-24

## 2017-07-24 RX ORDER — PROMETHAZINE HYDROCHLORIDE 25 MG/ML
12.5 INJECTION, SOLUTION INTRAMUSCULAR; INTRAVENOUS ONCE
Status: COMPLETED | OUTPATIENT
Start: 2017-07-24 | End: 2017-07-24

## 2017-07-24 RX ORDER — DEXAMETHASONE SODIUM PHOSPHATE 4 MG/ML
6 INJECTION, SOLUTION INTRA-ARTICULAR; INTRALESIONAL; INTRAMUSCULAR; INTRAVENOUS; SOFT TISSUE ONCE
Status: COMPLETED | OUTPATIENT
Start: 2017-07-24 | End: 2017-07-24

## 2017-07-24 RX ORDER — DIPHENHYDRAMINE HYDROCHLORIDE 50 MG/ML
25 INJECTION INTRAMUSCULAR; INTRAVENOUS ONCE
Status: COMPLETED | OUTPATIENT
Start: 2017-07-24 | End: 2017-07-24

## 2017-07-24 RX ADMIN — KETOROLAC TROMETHAMINE 30 MG: 30 INJECTION, SOLUTION INTRAMUSCULAR; INTRAVENOUS at 15:57

## 2017-07-24 RX ADMIN — SODIUM CHLORIDE 1000 ML: 9 INJECTION, SOLUTION INTRAVENOUS at 15:57

## 2017-07-24 RX ADMIN — DIPHENHYDRAMINE HYDROCHLORIDE 25 MG: 50 INJECTION, SOLUTION INTRAMUSCULAR; INTRAVENOUS at 23:45

## 2017-07-24 RX ADMIN — GADOBUTROL 7.5 ML: 604.72 INJECTION INTRAVENOUS at 18:22

## 2017-07-24 RX ADMIN — SODIUM CHLORIDE 100 ML: 9 INJECTION, SOLUTION INTRAVENOUS at 18:24

## 2017-07-24 RX ADMIN — HYDROMORPHONE HYDROCHLORIDE 1 MG: 1 INJECTION, SOLUTION INTRAMUSCULAR; INTRAVENOUS; SUBCUTANEOUS at 18:39

## 2017-07-24 RX ADMIN — METOCLOPRAMIDE 5 MG: 5 INJECTION, SOLUTION INTRAMUSCULAR; INTRAVENOUS at 23:46

## 2017-07-24 RX ADMIN — HYDROMORPHONE HYDROCHLORIDE 0.5 MG: 1 INJECTION, SOLUTION INTRAMUSCULAR; INTRAVENOUS; SUBCUTANEOUS at 22:29

## 2017-07-24 RX ADMIN — VALPROATE SODIUM 1000 MG: 100 INJECTION, SOLUTION INTRAVENOUS at 21:07

## 2017-07-24 RX ADMIN — SODIUM CHLORIDE 1000 ML: 9 INJECTION, SOLUTION INTRAVENOUS at 22:29

## 2017-07-24 RX ADMIN — PROMETHAZINE HYDROCHLORIDE 12.5 MG: 25 INJECTION INTRAMUSCULAR; INTRAVENOUS at 15:57

## 2017-07-24 RX ADMIN — DEXAMETHASONE SODIUM PHOSPHATE 6 MG: 4 INJECTION, SOLUTION INTRA-ARTICULAR; INTRALESIONAL; INTRAMUSCULAR; INTRAVENOUS; SOFT TISSUE at 22:30

## 2017-07-24 ASSESSMENT — ENCOUNTER SYMPTOMS
DIARRHEA: 0
SHORTNESS OF BREATH: 0
VOMITING: 0
ABDOMINAL PAIN: 0
FEVER: 0
HEADACHES: 1
NUMBNESS: 1
COUGH: 0

## 2017-07-24 NOTE — ED PROVIDER NOTES
"  History     Chief Complaint   Patient presents with     Headache     since 7/4/17     Neurologic Problem     off and on face numbness, blurred vision at times     HPI  Ada Welch is a 46 year old female with a history of essential hypertension, Tom-Danlos syndrome, chronic migraines, chronic pain syndrome, chronic migraines, DDD of neck and back s/p L4 to S1 fusion and L1 and L2 laminectomies, opioid dependence, and anxiety who presents to the ER today for evaluation of headache. The patient reports a history of chronic migraines. She states that most recently she developed a headache on 7/4 which has persisted more or less constantly since then. The headache is a pulsating pain in the back of her head. She states that this headache is different from her typical migraines as she's had intermittent left-sided facial numbness, intermittent sensation of hearing a \"guitar amp sound,\" and intermittent shocking pain that starts in the back left occiput and radiates to her face. She states her headache is provoked with sitting upright or with tilting her head back. She reports associated photophobia and phonophobia. She also reports blurry vision and difficulty focusing, but no double vision. Her balance feels \"wonky\" and like how it felt after a previous sinus surgery. No fevers. The patient has tried her antimigraine medications that have worked previously, including sumatriptan, Tylenol, and Benadryl, but these have not provided relief. She reports that today she's only taken tizanidine and Relafen. As above, the patient has a history of chronic neck and back pain with known degenerative changes and foraminal narrowing, known syrinx and chiari malformation. She states that she has follow-up with Dr. Landers with Neurology next week Wednesday, and states that she has planned appointment with a planned MRI with a neurosurgeon in [facility] In November.    Current Facility-Administered Medications "   Medication     0.9% sodium chloride infusion     gabapentin (NEURONTIN) capsule 300 mg     Current Outpatient Prescriptions   Medication     etodolac (LODINE) 400 MG tablet     rizatriptan (MAXALT-MLT) 5 MG ODT tab     ondansetron (ZOFRAN-ODT) 4 MG ODT tab     buprenorphine (BUTRANS) 10 MCG/HR WK patch     ketamine, KETALAR, 5%-gabapentin, NEURONTIN, 8%-lidocaine 2.5% 5%-8% GEL topical PLO cream     tiZANidine (ZANAFLEX) 4 MG tablet     levothyroxine (SYNTHROID) 25 mcg/mL SUSP     omeprazole (PRILOSEC) 20 MG CR capsule     ondansetron (ZOFRAN-ODT) 4 MG ODT tab     fluticasone-salmeterol (ADVAIR DISKUS) 250-50 MCG/DOSE diskus inhaler     cetirizine (ZYRTEC) 10 MG tablet     FLUTICASONE FUROATE NA     Multiple Vitamins-Minerals (MULTIVITAMIN PO)     Probiotic Product (PROBIOTIC DAILY PO)     lidocaine (LIDODERM) 5 % Patch     polyethylene glycol (MIRALAX/GLYCOLAX) powder     History reviewed. No pertinent past medical history.    Past Surgical History:   Procedure Laterality Date     AS REPAIR OF NASAL SEPTUM       TONSILLECTOMY       TUBAL LIGATION         Family History   Problem Relation Age of Onset     Connective Tissue Disorder Mother      eds     Connective Tissue Disorder Sister      eds       Social History   Substance Use Topics     Smoking status: Former Smoker     Packs/day: 15.00     Years: 0.25     Types: Other     Quit date: 7/17/2014     Smokeless tobacco: Never Used     Alcohol use No        Allergies   Allergen Reactions     Bee Venom Anaphylaxis, Difficulty breathing, Palpitations, Shortness Of Breath and Visual Disturbance     Chicken-Derived Products (Egg) Diarrhea     Lactose Dermatitis and Diarrhea     Wheat Diarrhea       I have reviewed the Medications, Allergies, Past Medical and Surgical History, and Social History in the Epic system.    Review of Systems   Constitutional: Negative for fever.   Respiratory: Negative for cough and shortness of breath.    Cardiovascular: Negative for chest  "pain.   Gastrointestinal: Negative for abdominal pain, diarrhea and vomiting.   Musculoskeletal: Positive for gait problem.   Neurological: Positive for numbness (left face) and headaches.       Physical Exam   BP: (!) 144/94  Pulse: 100  Temp: 98.3  F (36.8  C)  Resp: 16  Height: 170.2 cm (5' 7\")  Weight: 88.6 kg (195 lb 6.4 oz)  SpO2: 98 %  Physical Exam   Constitutional: She is oriented to person, place, and time. She appears well-developed and well-nourished. She appears distressed.   HENT:   Head: Normocephalic and atraumatic.   Right Ear: External ear normal.   Left Ear: External ear normal.   Mouth/Throat: Oropharynx is clear and moist. No oropharyngeal exudate.   Eyes: Pupils are equal, round, and reactive to light. No scleral icterus.   Neck: Normal range of motion. Neck supple.   Cardiovascular: Normal rate, regular rhythm, normal heart sounds and intact distal pulses.    Pulmonary/Chest: Effort normal and breath sounds normal. No respiratory distress.   Abdominal: Soft. Bowel sounds are normal. There is no tenderness.   Musculoskeletal: Normal range of motion. She exhibits no edema or tenderness.   Neurological: She is alert and oriented to person, place, and time. She has normal reflexes. She displays normal reflexes. No cranial nerve deficit. She exhibits normal muscle tone. Coordination normal.   Skin: Skin is warm. No rash noted. She is not diaphoretic.   Psychiatric: She has a normal mood and affect.       ED Course     ED Course     Procedures             Critical Care time:  none               Labs Ordered and Resulted from Time of ED Arrival Up to the Time of Departure from the ED   COMPREHENSIVE METABOLIC PANEL - Abnormal; Notable for the following:        Result Value    Chloride 110 (*)     ALT 56 (*)     All other components within normal limits   CBC WITH PLATELETS DIFFERENTIAL   CRP INFLAMMATION   ERYTHROCYTE SEDIMENTATION RATE AUTO            Assessments & Plan (with Medical Decision " Making)  1. Migraine headache  2. Left facial numbness   3. Post laminectomy syndrome lumbar region with chronic pain syndrome   4. Tom Danlos syndrome    This patient is a 46 year old female who presents with history of intractable headache since 07/04/2017.  She presents today with some very severe pain to the point where it has been disabling, and she has not been able to go work or manage her day-to-day activities.  Upon arrival here,  vital signs were stable. She was afebrile.  Oximetry was normal.  Patient s neuro exam was fairly unremarkable for any neurofocal findings.  Patient was initially given 1 L of normal saline and Phenergan 12.5 milligrams IV and Toradol 30 mg IV with very limited improvement in her pain.  CRP inflammatory marker sed rate and white count were all normal.  Chemistry also normal.  This patient was then given gabapentin 300 mg, but patient declined. Because of concern about ongoing headache progressively worsening and with a left facial numbness, I elected to have neurology come down to see the patient.  The neuro consult recommended trying  also Depakote  in addition to Decadron.  Patient received both with the very minimal relief in her symptoms.  She was given Dilaudid 1 mg and 0.5 mg on 2 separate doses with some minimal improvement.  Patient s was given  doses of Reglan 5 mg IV along with Benadryl 25 mg and still we had no success in reducing her pain.  I did entertain the possibility of doing an LP but did not see any indication given that she had no infectious signs and this is a headache this been ongoing since July 4.  The magnesium sulfate 4g IV was also started the ER.  We did an MRI and MRA brain venogram and MR  of the cervical spine and all of these were negative for any significant findings.  There is no evidence of any venous sinus thrombosis or arterial dissection.  Given the context of her symptoms not improving, I spoke with neurology there were willing to admit  the patient for pain control and further evaluation of her migraines to see what else they can do.  Patient is agreeable to coming in.        This part of the medical record was transcribed by Raul Nichole, Medical Scribe, from a dictation done by Smooth Whitaker MD.          I have reviewed the nursing notes.    I have reviewed the findings, diagnosis, plan and need for follow up with the patient.    New Prescriptions    No medications on file       Final diagnoses:   Intractable headache, unspecified chronicity pattern, unspecified headache type   Tom-Danlos syndrome   Pain syndrome, chronic   Postlaminectomy syndrome, lumbar region     I, Shane Alnoso, am serving as a trained medical scribe to document services personally performed by Smooth Whitaker MD, based on the provider's statements to me.      I, Smooth Whitaker MD, was physically present and have reviewed and verified the accuracy of this note documented by Shane Alonso.    7/24/2017   [facility], [place], EMERGENCY DEPARTMENT     Smooth Whitaker MD  07/25/17 0122

## 2017-07-24 NOTE — IP AVS SNAPSHOT
[address] [address]    Phone:  [phone]                                       After Visit Summary   7/24/2017    Ada Welch    MRN: [id number]           After Visit Summary Signature Page     I have received my discharge instructions, and my questions have been answered. I have discussed any challenges I see with this plan with the nurse or doctor.    ..........................................................................................................................................  Patient/Patient Representative Signature      ..........................................................................................................................................  Patient Representative Print Name and Relationship to Patient    ..................................................               ................................................  Date                                            Time    ..........................................................................................................................................  Reviewed by Signature/Title    ...................................................              ..............................................  Date                                                            Time

## 2017-07-24 NOTE — IP AVS SNAPSHOT
MRN:[id number]                      After Visit Summary   7/24/2017    Ada Welch    MRN: [id number]           Thank you!     Thank you for choosing [facility] for your care. Our goal is always to provide you with excellent care. Hearing back from our patients is one way we can continue to improve our services. Please take a few minutes to complete the written survey that you may receive in the mail after you visit with us. Thank you!        Patient Information     Date Of Birth          1970        Designated Caregiver       Most Recent Value    Caregiver    Will someone help with your care after discharge? no      About your hospital stay     You were admitted on:  July 25, 2017 You last received care in the:  [address] [facility]    You were discharged on:  July 27, 2017        Reason for your hospital stay       Migraine, intractable.                  Who to Call     For medical emergencies, please call 911.  For non-urgent questions about your medical care, please call your primary care provider or clinic, [phone]          Attending Provider     Provider Specialty    Smooth Whitaker MD Emergency Medicine    Gilmer Jeffrey MD Neurology       Primary Care Provider Office Phone # Fax #    Ellyn Rivera [phone]      After Care Instructions     Activity       Your activity upon discharge: activity as tolerated            Diet       Follow this diet upon discharge: Orders Placed This Encounter      Combination Diet Regular Diet Adult            Discharge Instructions       Take medrol dosepak as prescribed.  Take zofran as needed.  Follow up as above.                  Follow-up Appointments     Follow Up and recommended labs and tests       Follow up with Fara Varner for headaches.  Follow up with your PCP within the next week for a post-hospital follow up.  Follow up with pain medicine as previously scheduled.  Follow up with neurosurgery as  previously scheduled.                  Your next 10 appointments already scheduled     Aug 07, 2017  3:40 PM CDT   (Arrive by 3:25 PM)   Return Visit with Faiza Martinez MD   [facility] ([facility] [place])    [address]  [address] [facility] [address] [phone]            Aug 14, 2017  8:30 AM CDT   (Arrive by 8:15 AM)   New Patient Visit with FIDELINA Ridley CNP   [facility] Neurology ([facility] [place])    [address]  [address] [facility] [address]   [phone]            Aug 30, 2017 11:00 AM CDT   (Arrive by 10:45 AM)   NEW FEMALE PELVIC with Wade Singh MD   [facility] ([facility] [place])    [address] [place] [address]  [address] [facility] [id number] [phone]            Aug 30, 2017  1:30 PM CDT   (Arrive by 1:15 PM)   New Patient Visit with Rey Tello MD   [facility] ([facility])    [address] [place] [address]  [address] [facility] [address]   [phone]            Aug 30, 2017  3:00 PM CDT   (Arrive by 2:45 PM)   New Patient Visit with Zoe Barahona MD   [facility]ro[facility] ([facility] [place])    [address] [place] [address]  [address] [facility] [address]   [phone]            Sep 12, 2017  9:05 AM CDT   (Arrive by 8:50 AM)   New Patient Visit with Jerad Hebert MD   [facility] ([facility] [place])    [address]  [address] [facility] [address]   [phone]            Sep 21, 2017 11:25 AM CDT   (Arrive by 11:10 AM)   Return Visit with Ellyn Rivera MD   [facility] ([facility])    [address] [place] [address]  [address] [facility] [address]   [phone]              Additional Services     Neurology Adult Referral        "Please schedule in next week.            Neurology Adult Referral       Please schedule with Fara Varner.                  Pending Results     No orders found from 7/22/2017 to 7/25/2017.            Statement of Approval     Ordered          07/27/17 1141  I have reviewed and agree with all the recommendations and orders detailed in this document.  EFFECTIVE NOW     Approved and electronically signed by:  Oli Shah MD             Admission Information     Date & Time Provider Department Dept. Phone    7/24/2017 Gilmer Jeffrey MD [address] [facility] [phone]      Your Vitals Were     Blood Pressure Pulse Temperature Respirations Height Weight    128/74 (BP Location: Right arm) 93 97.5  F (36.4  C) (Oral) 18 1.702 m (5' 7\") 88.7 kg (195 lb 8 oz)    Last Period Pulse Oximetry BMI (Body Mass Index)             07/10/2017 100% 30.62 kg/m2         MyChart Information     [employer] gives you secure access to your electronic health record. If you see a primary care provider, you can also send messages to your care team and make appointments. If you have questions, please call your primary care clinic.  If you do not have a primary care provider, please call [phone] and they will assist you.        Care EveryWhere ID     This is your Care EveryWhere ID. This could be used by other organizations to access your [place] medical records  [id number]        Equal Access to Services     CHRISTINA GUY : Ashutosh romoo Sonegrito, waaxda luqadaha, qaybta kaalmaodette devine. So [facility] [phone].    ATENCIÓN: Si habla español, tiene a sweeney disposición servicios gratuitos de asistencia lingüística. Llame al [phone].    We comply with applicable federal civil rights laws and [place] laws. We do not discriminate on the basis of race, color, national origin, age, disability sex, sexual orientation or gender identity.               Review of your medicines "      START taking        Dose / Directions    methylPREDNISolone 4 MG tablet   Commonly known as:  MEDROL DOSEPAK   Used for:  Intractable headache, unspecified chronicity pattern, unspecified headache type        Follow package instructions   Quantity:  21 tablet   Refills:  0         CONTINUE these medicines which may have CHANGED, or have new prescriptions. If we are uncertain of the size of tablets/capsules you have at home, strength may be listed as something that might have changed.        Dose / Directions    * ondansetron 4 MG ODT tab   Commonly known as:  ZOFRAN-ODT   This may have changed:  Another medication with the same name was added. Make sure you understand how and when to take each.        Dose:  4 mg   4 mg every 12 hours as needed   Refills:  0       * ondansetron 4 MG ODT tab   Commonly known as:  ZOFRAN-ODT   This may have changed:  Another medication with the same name was added. Make sure you understand how and when to take each.   Used for:  Migraine without status migrainosus, not intractable, unspecified migraine type        Dose:  4-8 mg   Take 1-2 tablets (4-8 mg) by mouth every 12 hours as needed for nausea   Quantity:  60 tablet   Refills:  1       * ondansetron 4 MG ODT tab   Commonly known as:  ZOFRAN ODT   This may have changed:  You were already taking a medication with the same name, and this prescription was added. Make sure you understand how and when to take each.   Used for:  Intractable headache, unspecified chronicity pattern, unspecified headache type        Dose:  4-8 mg   Take 1-2 tablets (4-8 mg) by mouth every 8 hours as needed for nausea   Quantity:  20 tablet   Refills:  1       * Notice:  This list has 3 medication(s) that are the same as other medications prescribed for you. Read the directions carefully, and ask your doctor or other care provider to review them with you.      CONTINUE these medicines which have NOT CHANGED        Dose / Directions    SAUL LEDEZMA  250-50 MCG/DOSE diskus inhaler   Generic drug:  fluticasone-salmeterol        Refills:  0       buprenorphine 10 MCG/HR WK patch   Commonly known as:  BUTRANS   Used for:  Postlaminectomy syndrome of lumbar region        Dose:  1 patch   Place 1 patch onto the skin every 7 days   Quantity:  4 patch   Refills:  3       cetirizine 10 MG tablet   Commonly known as:  zyrTEC        Refills:  0       FLUTICASONE FUROATE NA        Dose:  50 mcg   Spray 50 mcg in nostril At Bedtime   Refills:  0       ketamine (KETALAR) 5%-gabapentin (NEURONTIN) 8%-lidocaine 2.5% 5%-8% Gel topical PLO cream   Used for:  Postlaminectomy syndrome of lumbosacral region, Postlaminectomy syndrome of lumbar region        Dose:  1 g   Apply 1 g topically 3 times daily as needed   Quantity:  30 g   Refills:  3       levothyroxine 25 mcg/mL Susp   Commonly known as:  SYNTHROID        Dose:  25 mcg   25 mcg every morning   Refills:  0       lidocaine 5 % Patch   Commonly known as:  LIDODERM   Used for:  Post laminectomy syndrome        Apply up to 3 patches to painful area at once for up to 12 h within a 24 h period.  Remove after 12 hours.   Quantity:  30 patch   Refills:  1       MULTIVITAMIN PO        Take by mouth daily   Refills:  0       omeprazole 20 MG CR capsule   Commonly known as:  priLOSEC        Dose:  20 mg   20 mg 2 times daily   Refills:  0       polyethylene glycol powder   Commonly known as:  MIRALAX/GLYCOLAX        Refills:  0       PROBIOTIC DAILY PO        Take by mouth 2 times daily   Refills:  0       rizatriptan 5 MG ODT tab   Commonly known as:  MAXALT-MLT   Used for:  Migraine without status migrainosus, not intractable, unspecified migraine type        Dose:  5-10 mg   Take 1-2 tablets (5-10 mg) by mouth at onset of headache for migraine (max 30 mg in 24 hours)   Quantity:  20 tablet   Refills:  0       tiZANidine 4 MG tablet   Commonly known as:  ZANAFLEX   Used for:  Post laminectomy syndrome        Dose:  8 mg   Take 2  tablets (8 mg) by mouth 3 times daily as needed for muscle spasms   Quantity:  90 tablet   Refills:  3            Where to get your medicines      Some of these will need a paper prescription and others can be bought over the counter. Ask your nurse if you have questions.     Bring a paper prescription for each of these medications     methylPREDNISolone 4 MG tablet    ondansetron 4 MG ODT tab    rizatriptan 5 MG ODT tab                Protect others around you: Learn how to safely use, store and throw away your medicines at www.disposemymeds.org.             Medication List: This is a list of all your medications and when to take them. Check marks below indicate your daily home schedule. Keep this list as a reference.      Medications           Morning Afternoon Evening Bedtime As Needed    ADVAIR DISKUS 250-50 MCG/DOSE diskus inhaler   Generic drug:  fluticasone-salmeterol                                buprenorphine 10 MCG/HR WK patch   Commonly known as:  BUTRANS   Place 1 patch onto the skin every 7 days                                cetirizine 10 MG tablet   Commonly known as:  zyrTEC   Last time this was given:  10 mg on 7/27/2017  8:27 AM                                FLUTICASONE FUROATE NA   Spray 50 mcg in nostril At Bedtime                                ketamine (KETALAR) 5%-gabapentin (NEURONTIN) 8%-lidocaine 2.5% 5%-8% Gel topical PLO cream   Apply 1 g topically 3 times daily as needed                                levothyroxine 25 mcg/mL Susp   Commonly known as:  SYNTHROID   25 mcg every morning                                lidocaine 5 % Patch   Commonly known as:  LIDODERM   Apply up to 3 patches to painful area at once for up to 12 h within a 24 h period.  Remove after 12 hours.                                methylPREDNISolone 4 MG tablet   Commonly known as:  MEDROL DOSEPAK   Follow package instructions                                MULTIVITAMIN PO   Take by mouth daily                                 omeprazole 20 MG CR capsule   Commonly known as:  priLOSEC   20 mg 2 times daily   Last time this was given:  20 mg on 7/27/2017  8:27 AM                                * ondansetron 4 MG ODT tab   Commonly known as:  ZOFRAN-ODT   4 mg every 12 hours as needed   Last time this was given:  4 mg on 7/27/2017  5:35 AM                                * ondansetron 4 MG ODT tab   Commonly known as:  ZOFRAN-ODT   Take 1-2 tablets (4-8 mg) by mouth every 12 hours as needed for nausea   Last time this was given:  4 mg on 7/27/2017  5:35 AM                                * ondansetron 4 MG ODT tab   Commonly known as:  ZOFRAN ODT   Take 1-2 tablets (4-8 mg) by mouth every 8 hours as needed for nausea   Last time this was given:  4 mg on 7/27/2017  5:35 AM                                polyethylene glycol powder   Commonly known as:  MIRALAX/GLYCOLAX                                PROBIOTIC DAILY PO   Take by mouth 2 times daily                                rizatriptan 5 MG ODT tab   Commonly known as:  MAXALT-MLT   Take 1-2 tablets (5-10 mg) by mouth at onset of headache for migraine (max 30 mg in 24 hours)                                tiZANidine 4 MG tablet   Commonly known as:  ZANAFLEX   Take 2 tablets (8 mg) by mouth 3 times daily as needed for muscle spasms   Last time this was given:  8 mg on 7/27/2017  7:03 AM                                * Notice:  This list has 3 medication(s) that are the same as other medications prescribed for you. Read the directions carefully, and ask your doctor or other care provider to review them with you.

## 2017-07-24 NOTE — ED NOTES
Triage Assessment & Note:      Patient presents with:  Pt comes to triage with reports of a migraine since 07/04/17. Pt reports that due to this headache she is out of some of her medication. In addition, pt reports some intermittent  face numbness and blurred vision. No reports of fever, cough, or SOB.    Home Treatments/Remedies: Home medication    Febrile / Afebrile: afebrile    Duration of C/o: since 07/04/17    Olamide Landis RN  July 24, 2017

## 2017-07-24 NOTE — CONSULTS
Neurology Consultation    Adageovanna Welch    46 year old       Reason for consult:  headache investigation and management           HPI:   46 years old female with history of hypertension, migraines,  Ehler-DAnlos syndrome and C4 syrinx, as opioid dependence and anxiety presents today ED today complaining of  headache. Her headache started on July 4 has been daily waxing and waning but since yesterday it's much worse. The headache is pulsating,  with photophobia  and phonophobia. The difference from her regular migraines is that this headache is not in occipital region, as she has a left facial numbness, and she is listening loud noises in her ear. Since yesterday, her headache is so intense when she stands up that She is not tolerating this position and has to lay down all the time.   She refuses to denies double vision, change in her voice, dysarthria as difficulty on swallowing .   No confusion.     She refers that recently is being working with physical therapy and before this week she listened a pop in her neck. She refers that she is not doing any cervical manipulation .     In terms of her syrinx she refers that it is a stable. She does not have  pain but endorses some kind of numbness in her hands that is chronic. She also refers that she is investigation for  Chiari malformation due to her cervical's syrinx              Past Medical History:   History reviewed. No pertinent past medical history.           Past Surgical History:     Past Surgical History:   Procedure Laterality Date     AS REPAIR OF NASAL SEPTUM       TONSILLECTOMY       TUBAL LIGATION                Social History:     Social History   Substance Use Topics     Smoking status: Former Smoker     Packs/day: 15.00     Years: 0.25     Types: Other     Quit date: 7/17/2014     Smokeless tobacco: Never Used     Alcohol use No              Family History:     Family History   Problem Relation Age of Onset     Connective Tissue Disorder Mother       eds     Connective Tissue Disorder Sister      eds              Allergies:     Allergies   Allergen Reactions     Bee Venom Anaphylaxis, Difficulty breathing, Palpitations, Shortness Of Breath and Visual Disturbance     Chicken-Derived Products (Egg) Diarrhea     Lactose Dermatitis and Diarrhea     Wheat Diarrhea             Medications:     Current Facility-Administered Medications   Medication     0.9% sodium chloride infusion     gabapentin (NEURONTIN) capsule 300 mg     Current Outpatient Prescriptions   Medication Sig     etodolac (LODINE) 400 MG tablet Take 400 mg by mouth 2 times daily     rizatriptan (MAXALT-MLT) 5 MG ODT tab Take 1-2 tablets (5-10 mg) by mouth at onset of headache for migraine (max 30 mg in 24 hours)     ondansetron (ZOFRAN-ODT) 4 MG ODT tab Take 1-2 tablets (4-8 mg) by mouth every 12 hours as needed for nausea     buprenorphine (BUTRANS) 10 MCG/HR WK patch Place 1 patch onto the skin every 7 days     ketamine, KETALAR, 5%-gabapentin, NEURONTIN, 8%-lidocaine 2.5% 5%-8% GEL topical PLO cream Apply 1 g topically 3 times daily as needed     tiZANidine (ZANAFLEX) 4 MG tablet Take 2 tablets (8 mg) by mouth 3 times daily as needed for muscle spasms     levothyroxine (SYNTHROID) 25 mcg/mL SUSP 25 mcg every morning     omeprazole (PRILOSEC) 20 MG CR capsule 20 mg 2 times daily     ondansetron (ZOFRAN-ODT) 4 MG ODT tab 4 mg every 12 hours as needed     fluticasone-salmeterol (ADVAIR DISKUS) 250-50 MCG/DOSE diskus inhaler      cetirizine (ZYRTEC) 10 MG tablet      FLUTICASONE FUROATE NA Spray 50 mcg in nostril At Bedtime     Multiple Vitamins-Minerals (MULTIVITAMIN PO) Take by mouth daily     Probiotic Product (PROBIOTIC DAILY PO) Take by mouth 2 times daily     lidocaine (LIDODERM) 5 % Patch Apply up to 3 patches to painful area at once for up to 12 h within a 24 h period.  Remove after 12 hours.     polyethylene glycol (MIRALAX/GLYCOLAX) powder               Review of Systems:   The 10 point Review  "of Systems is negative other than noted in the HPI          Physical Exam:   Vital signs:  Temp: 98.3  F (36.8  C) Temp src: Oral BP: 127/68 Pulse: 100   Resp: 16 SpO2: 98 % O2 Device: None (Room air)   Height: 170.2 cm (5' 7\") Weight: 88.6 kg (195 lb 6.4 oz)  Estimated body mass index is 30.6 kg/(m^2) as calculated from the following:    Height as of this encounter: 1.702 m (5' 7\").    Weight as of this encounter: 88.6 kg (195 lb 6.4 oz).    Constitutional : well-built  Head: atraumatic, anicteric. See neuroexam  Eyes: see neuroexam  CVC: S1/S2 rhythmic no murmurs  LUng: Clear. On room air. No respiratory distress.   Adomen: soft, non tender, bowel movements heard  Extremities: Pulses preserved in four extremities. No edema. Well perfused    Neuroexam  Alert and oriented to place, date, self and reasons of hospitalization.    Follow commands  Face symmetric. No ptosis  Pupils are symmetric and reactive  Eyes: motility preserved, no nystagmus  Fundoscopy is preserved, no signs of papilledema  Tongue centered  Bilateral action tremor is noted  No dysmetria (arms and legs tested)  Strength preserved on arms and legs , proximally and distally  Reflexes preserved  Sensory exam to light touch: Decreased on left side of face  Sensory exam to pinprick preserved including her neck, face, hands, thorax,back and lower extremities  No aphasia  No dysarthria             Data:     Results for orders placed or performed during the hospital encounter of 07/24/17 (from the past 24 hour(s))   Comprehensive metabolic panel   Result Value Ref Range    Sodium 141 133 - 144 mmol/L    Potassium 4.0 3.4 - 5.3 mmol/L    Chloride 110 (H) 94 - 109 mmol/L    Carbon Dioxide 27 20 - 32 mmol/L    Anion Gap 5 3 - 14 mmol/L    Glucose 88 70 - 99 mg/dL    Urea Nitrogen 8 7 - 30 mg/dL    Creatinine 0.85 0.52 - 1.04 mg/dL    GFR Estimate 72 >60 mL/min/1.7m2    GFR Estimate If Black 87 >60 mL/min/1.7m2    Calcium 8.9 8.5 - 10.1 mg/dL    Bilirubin " Total 0.6 0.2 - 1.3 mg/dL    Albumin 4.1 3.4 - 5.0 g/dL    Protein Total 7.4 6.8 - 8.8 g/dL    Alkaline Phosphatase 148 40 - 150 U/L    ALT 56 (H) 0 - 50 U/L    AST 36 0 - 45 U/L   CBC with platelets differential   Result Value Ref Range    WBC 5.7 4.0 - 11.0 10e9/L    RBC Count 4.41 3.8 - 5.2 10e12/L    Hemoglobin 13.5 11.7 - 15.7 g/dL    Hematocrit 39.5 35.0 - 47.0 %    MCV 90 78 - 100 fl    MCH 30.6 26.5 - 33.0 pg    MCHC 34.2 31.5 - 36.5 g/dL    RDW 12.6 10.0 - 15.0 %    Platelet Count 238 150 - 450 10e9/L    Diff Method Automated Method     % Neutrophils 61.9 %    % Lymphocytes 29.0 %    % Monocytes 5.1 %    % Eosinophils 4.0 %    % Basophils 0.0 %    % Immature Granulocytes 0.0 %    Nucleated RBCs 0 0 /100    Absolute Neutrophil 3.5 1.6 - 8.3 10e9/L    Absolute Lymphocytes 1.7 0.8 - 5.3 10e9/L    Absolute Monocytes 0.3 0.0 - 1.3 10e9/L    Absolute Eosinophils 0.2 0.0 - 0.7 10e9/L    Absolute Basophils 0.0 0.0 - 0.2 10e9/L    Abs Immature Granulocytes 0.0 0 - 0.4 10e9/L    Absolute Nucleated RBC 0.0    CRP inflammation   Result Value Ref Range    CRP Inflammation <2.9 0.0 - 8.0 mg/L   Erythrocyte sedimentation rate auto   Result Value Ref Range    Sed Rate 10 0 - 20 mm/h            Assessment and Plan:   46 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 Due to occipital characteristic of headache, we would like to rule out increase of her syrinx to C2 level. Since her headache is since fourth of July we would like to rule out venous sinus thrombosis.  Neurology suggests   -MRI brain stroke protocol with MRV and cervical MRI with and without contrast   -For her pain management ED is free to give her any kind of medication for pain, including but not limited to Toradol, gabapentin, Depakote      Please page [phone] for an MRI is done for further  suggestions    Attestation:  Patient's discussed over the phone with Dr. Wojciech Pope  PGY4 Neurology  [phone]

## 2017-07-25 PROBLEM — R51.9 HEADACHE: Status: ACTIVE | Noted: 2017-07-25

## 2017-07-25 LAB — INR PPP: 1.01 (ref 0.86–1.14)

## 2017-07-25 PROCEDURE — 85610 PROTHROMBIN TIME: CPT | Performed by: STUDENT IN AN ORGANIZED HEALTH CARE EDUCATION/TRAINING PROGRAM

## 2017-07-25 PROCEDURE — G0378 HOSPITAL OBSERVATION PER HR: HCPCS

## 2017-07-25 PROCEDURE — 96375 TX/PRO/DX INJ NEW DRUG ADDON: CPT

## 2017-07-25 PROCEDURE — 25000128 H RX IP 250 OP 636: Performed by: STUDENT IN AN ORGANIZED HEALTH CARE EDUCATION/TRAINING PROGRAM

## 2017-07-25 PROCEDURE — 25000132 ZZH RX MED GY IP 250 OP 250 PS 637: Performed by: STUDENT IN AN ORGANIZED HEALTH CARE EDUCATION/TRAINING PROGRAM

## 2017-07-25 PROCEDURE — 96376 TX/PRO/DX INJ SAME DRUG ADON: CPT

## 2017-07-25 PROCEDURE — 25000128 H RX IP 250 OP 636: Performed by: FAMILY MEDICINE

## 2017-07-25 PROCEDURE — 36415 COLL VENOUS BLD VENIPUNCTURE: CPT | Performed by: STUDENT IN AN ORGANIZED HEALTH CARE EDUCATION/TRAINING PROGRAM

## 2017-07-25 RX ORDER — LEVOTHYROXINE SODIUM 25 UG/1
25 TABLET ORAL
Status: DISCONTINUED | OUTPATIENT
Start: 2017-07-25 | End: 2017-07-27 | Stop reason: HOSPADM

## 2017-07-25 RX ORDER — IBUPROFEN 600 MG/1
600 TABLET, FILM COATED ORAL 4 TIMES DAILY
Status: DISCONTINUED | OUTPATIENT
Start: 2017-07-25 | End: 2017-07-25

## 2017-07-25 RX ORDER — KETOROLAC TROMETHAMINE 30 MG/ML
30 INJECTION, SOLUTION INTRAMUSCULAR; INTRAVENOUS EVERY 6 HOURS PRN
Status: DISCONTINUED | OUTPATIENT
Start: 2017-07-25 | End: 2017-07-26

## 2017-07-25 RX ORDER — HYDROMORPHONE HYDROCHLORIDE 1 MG/ML
0.5 INJECTION, SOLUTION INTRAMUSCULAR; INTRAVENOUS; SUBCUTANEOUS
Status: DISCONTINUED | OUTPATIENT
Start: 2017-07-25 | End: 2017-07-26

## 2017-07-25 RX ORDER — ONDANSETRON 2 MG/ML
2 INJECTION INTRAMUSCULAR; INTRAVENOUS EVERY 4 HOURS PRN
Status: DISCONTINUED | OUTPATIENT
Start: 2017-07-25 | End: 2017-07-26

## 2017-07-25 RX ORDER — PROMETHAZINE HYDROCHLORIDE 25 MG/ML
25 INJECTION, SOLUTION INTRAMUSCULAR; INTRAVENOUS EVERY 6 HOURS PRN
Status: DISCONTINUED | OUTPATIENT
Start: 2017-07-25 | End: 2017-07-27 | Stop reason: HOSPADM

## 2017-07-25 RX ORDER — METOCLOPRAMIDE HYDROCHLORIDE 5 MG/ML
10 INJECTION INTRAMUSCULAR; INTRAVENOUS EVERY 6 HOURS PRN
Status: DISCONTINUED | OUTPATIENT
Start: 2017-07-25 | End: 2017-07-26

## 2017-07-25 RX ORDER — DIHYDROERGOTAMINE MESYLATE 1 MG/ML
0.5 INJECTION, SOLUTION INTRAMUSCULAR; INTRAVENOUS; SUBCUTANEOUS ONCE
Status: COMPLETED | OUTPATIENT
Start: 2017-07-25 | End: 2017-07-25

## 2017-07-25 RX ORDER — DIHYDROERGOTAMINE MESYLATE 1 MG/ML
.25-1 INJECTION, SOLUTION INTRAMUSCULAR; INTRAVENOUS; SUBCUTANEOUS EVERY 8 HOURS
Status: DISCONTINUED | OUTPATIENT
Start: 2017-07-26 | End: 2017-07-27

## 2017-07-25 RX ORDER — METOCLOPRAMIDE HYDROCHLORIDE 5 MG/ML
10 INJECTION INTRAMUSCULAR; INTRAVENOUS EVERY 6 HOURS
Status: DISCONTINUED | OUTPATIENT
Start: 2017-07-25 | End: 2017-07-25

## 2017-07-25 RX ORDER — NALOXONE HYDROCHLORIDE 0.4 MG/ML
.1-.4 INJECTION, SOLUTION INTRAMUSCULAR; INTRAVENOUS; SUBCUTANEOUS
Status: DISCONTINUED | OUTPATIENT
Start: 2017-07-25 | End: 2017-07-27 | Stop reason: HOSPADM

## 2017-07-25 RX ORDER — LORAZEPAM 2 MG/ML
1 INJECTION INTRAMUSCULAR ONCE
Status: COMPLETED | OUTPATIENT
Start: 2017-07-25 | End: 2017-07-25

## 2017-07-25 RX ORDER — LORAZEPAM 2 MG/ML
1 INJECTION INTRAMUSCULAR EVERY 4 HOURS PRN
Status: COMPLETED | OUTPATIENT
Start: 2017-07-25 | End: 2017-07-26

## 2017-07-25 RX ORDER — HYDROMORPHONE HYDROCHLORIDE 1 MG/ML
0.5 INJECTION, SOLUTION INTRAMUSCULAR; INTRAVENOUS; SUBCUTANEOUS ONCE
Status: COMPLETED | OUTPATIENT
Start: 2017-07-25 | End: 2017-07-25

## 2017-07-25 RX ADMIN — LEVOTHYROXINE SODIUM 25 MCG: 25 TABLET ORAL at 07:46

## 2017-07-25 RX ADMIN — METOCLOPRAMIDE 10 MG: 5 INJECTION, SOLUTION INTRAMUSCULAR; INTRAVENOUS at 05:29

## 2017-07-25 RX ADMIN — LORAZEPAM 1 MG: 2 INJECTION INTRAMUSCULAR; INTRAVENOUS at 02:54

## 2017-07-25 RX ADMIN — KETOROLAC TROMETHAMINE 30 MG: 30 INJECTION, SOLUTION INTRAMUSCULAR; INTRAVENOUS at 05:42

## 2017-07-25 RX ADMIN — KETOROLAC TROMETHAMINE 30 MG: 30 INJECTION, SOLUTION INTRAMUSCULAR; INTRAVENOUS at 14:31

## 2017-07-25 RX ADMIN — KETOROLAC TROMETHAMINE 30 MG: 30 INJECTION, SOLUTION INTRAMUSCULAR; INTRAVENOUS at 22:51

## 2017-07-25 RX ADMIN — LORAZEPAM 1 MG: 2 INJECTION INTRAMUSCULAR; INTRAVENOUS at 23:59

## 2017-07-25 RX ADMIN — METOCLOPRAMIDE 10 MG: 5 INJECTION, SOLUTION INTRAMUSCULAR; INTRAVENOUS at 13:31

## 2017-07-25 RX ADMIN — MAGNESIUM SULFATE IN WATER 4 G: 40 INJECTION, SOLUTION INTRAVENOUS at 00:32

## 2017-07-25 RX ADMIN — ONDANSETRON 2 MG: 2 INJECTION INTRAMUSCULAR; INTRAVENOUS at 13:53

## 2017-07-25 RX ADMIN — ONDANSETRON 2 MG: 2 INJECTION INTRAMUSCULAR; INTRAVENOUS at 19:24

## 2017-07-25 RX ADMIN — HYDROMORPHONE HYDROCHLORIDE 0.5 MG: 1 INJECTION, SOLUTION INTRAMUSCULAR; INTRAVENOUS; SUBCUTANEOUS at 09:45

## 2017-07-25 RX ADMIN — PROMETHAZINE HYDROCHLORIDE 25 MG: 25 INJECTION INTRAMUSCULAR; INTRAVENOUS at 17:32

## 2017-07-25 RX ADMIN — OMEPRAZOLE 20 MG: 20 CAPSULE, DELAYED RELEASE ORAL at 07:46

## 2017-07-25 RX ADMIN — SODIUM CHLORIDE 1000 ML: 9 INJECTION, SOLUTION INTRAVENOUS at 09:11

## 2017-07-25 RX ADMIN — HYDROMORPHONE HYDROCHLORIDE 0.5 MG: 1 INJECTION, SOLUTION INTRAMUSCULAR; INTRAVENOUS; SUBCUTANEOUS at 23:49

## 2017-07-25 RX ADMIN — DIHYDROERGOTAMINE MESYLATE 0.5 MG: 1 INJECTION, SOLUTION INTRAMUSCULAR; INTRAVENOUS; SUBCUTANEOUS at 18:17

## 2017-07-25 RX ADMIN — SODIUM CHLORIDE 1000 ML: 9 INJECTION, SOLUTION INTRAVENOUS at 18:21

## 2017-07-25 ASSESSMENT — PAIN DESCRIPTION - DESCRIPTORS
DESCRIPTORS: HEADACHE

## 2017-07-25 NOTE — H&P
Neurology H&P Note  07/25/17    CC: Headache    HPI: Ada Welch is a 46 year old female who presented to the ER yesterday with a 3 week history of headaches. She was seen by the general neurology service and the HPI is as follows.     46 years old female with history of hypertension, migraines,  Ehler-DAnlos syndrome and C4 syrinx, as opioid dependence and anxiety presents today ED today complaining of  headache. Her headache started on July 4 has been daily waxing and waning but since yesterday it's much worse. The headache is pulsating,  with photophobia  and phonophobia. The difference from her regular migraines is that this headache is not in occipital region, as she has a left facial numbness, and she is listening loud noises in her ear. Since yesterday, her headache is so intense when she stands up that She is not tolerating this position and has to lay down all the time.   She refuses to denies double vision, change in her voice, dysarthria as difficulty on swallowing .   No confusion.      She refers that recently is being working with physical therapy and before this week she listened a pop in her neck. She refers that she is not doing any cervical manipulation .      In terms of her syrinx she refers that it is a stable. She does not have  pain but endorses some kind of numbness in her hands that is chronic. She also refers that she is investigation for  Chiari malformation due to her cervical's syrinx.     Patient received several medications while in the ED. This includes depakote, toradol, gabapentin, dilaudid, decadron, benadryl, reglan, phenergan and magnesium. She continued to have persistent headache and the ER physician determined she was not ready to discharge.     PMH: Patient history is notable for migraine headaches, Ehler Danlos syndrome, opioid dependence and self reporte syrinx.     FH: Mother and sister have Ehler Danlos syndrome.     SH: Denies current tobacco or alcohol use.     EXAM:  Awake, alert, attentive. Verbal language is fluent. Follows commands. Attends to both sides of visual fields. Eye movements are conjugate. No ptosis noted. Reports decreased sensation on the left face. Facial expression is symmetric. No dysarthria. No drift. Strength is intact bilaterally. Reflexes are symmetric. Sensation to touch is preserved. Coordination is intact.     IMAGING: MRI brain did not show any evidence of acute infarction. MRV did not show any evidence of venous sinus thrombosis. MRA did not show any evidence of dissection.     ASSESSMENT:   This patient presents with a 3 week long history of unremitting headache which at this point seems refractor of medications. All her imaging has been reassuring. Suspicion for CNS infection is very low given the chronicity of symptoms. Most likely, this is now a prolonged migraine episode.     PLAN:   - Admit for observation  - PRN toradol  - IV fluids  - PRN reglan  - Avoid opioids   - Day team to consider DHE protocol or pain service consult     Patient was discussed with staff Dr. Jeffrey.   [facility]  Neurology G4    Addendum:    Patient seen and discussed with staff Dr. Jeffrey.    Further history includes a sudden onset of the headache on July 4th with a change of positions and continued difficulty with position changes. This calls into question the possibility of a low pressure headache. We will attempt an LP at bedside. Due to multiple spinal fusions, may require IR help. Will start patient on DHE protocol.     Oli Shah MD  Neurology resident, PGY-2  (P) [phone]

## 2017-07-25 NOTE — PROCEDURES
[facility] Procedure Note         Neurology Bedside Procedure:     PROCEDURE PERFORMED:  Lumbar Puncture    PAUSE FOR THE CAUSE: Right patient: Yes      Right body part: Yes      Right procedure Yes    ULTRASOUND GUIDANCE:  No    PRIMARY INDICATION:  WM lesions investigation    DIAGNOSTIC DATA REVIEW:  PT/INR and Platelets within normal limites    LOCAL ANESTHESIA:  Lidocaine 1%     NUMBER OF KITS USED:  1 plus 22G seringe    ESTIMATED BLOOD LOSS:  Minimal    SPECIMENS COLLECTED:  None    SPECIMENS SENT:  none    COMPLICATIONS:  none    PRIMARY PROCEDURALIST:   Niesha Pope      time-out was completed verifying correct patient, procedure, site, positioning, and special equipment if applicable. The patient was placed in the left lateral decubitus position in a semi-fetal position with help from the nursing staff. The area was cleansed and draped in usual sterile fashion. 1% lidocaine was used anesthetize the surrounding skin area. A 22-gauge 3.5-inch spinal needle was placed in the L4-L5 interspace. Unfortunately, it was a dry lumbar puncture, no CSF was collected. l    IR lumbar puncture ordered.     Niesha Pope   PGY4 - Neurology

## 2017-07-25 NOTE — PLAN OF CARE
Problem: Discharge Planning  Goal: Discharge Planning (Adult, OB, Behavioral, Peds)  Outcome: No Change  Goal: headache will be under control : No, she continues to c/o headaches. MD was paged.

## 2017-07-25 NOTE — PLAN OF CARE
Problem: Goal Outcome Summary  Goal: Goal Outcome Summary  Outcome: No Change  Goal: headache will be under control.  Pt c/o headache with photophobia and phonophobia.  General neuro paged for pain medications.

## 2017-07-25 NOTE — PLAN OF CARE
Problem: Discharge Planning  Goal: Discharge Planning (Adult, OB, Behavioral, Peds)  Outcome: Improving  Goal: headache will be under control : No, but better after the one time of dilaudid.

## 2017-07-26 ENCOUNTER — APPOINTMENT (OUTPATIENT)
Dept: GENERAL RADIOLOGY | Facility: CLINIC | Age: 47
End: 2017-07-26
Payer: COMMERCIAL

## 2017-07-26 LAB
APPEARANCE CSF: CLEAR
COLOR CSF: COLORLESS
GLUCOSE BLDC GLUCOMTR-MCNC: 98 MG/DL (ref 70–99)
GLUCOSE CSF-MCNC: 63 MG/DL (ref 40–70)
PROT CSF-MCNC: 36 MG/DL (ref 15–60)
RBC # CSF MANUAL: 0 /UL (ref 0–2)
TUBE # CSF: 4 #
WBC # CSF MANUAL: 0 /UL (ref 0–5)

## 2017-07-26 PROCEDURE — 77003 FLUOROGUIDE FOR SPINE INJECT: CPT

## 2017-07-26 PROCEDURE — 82945 GLUCOSE OTHER FLUID: CPT | Performed by: STUDENT IN AN ORGANIZED HEALTH CARE EDUCATION/TRAINING PROGRAM

## 2017-07-26 PROCEDURE — 00000146 ZZHCL STATISTIC GLUCOSE BY METER IP

## 2017-07-26 PROCEDURE — 84157 ASSAY OF PROTEIN OTHER: CPT | Performed by: STUDENT IN AN ORGANIZED HEALTH CARE EDUCATION/TRAINING PROGRAM

## 2017-07-26 PROCEDURE — 25000128 H RX IP 250 OP 636: Performed by: STUDENT IN AN ORGANIZED HEALTH CARE EDUCATION/TRAINING PROGRAM

## 2017-07-26 PROCEDURE — 89050 BODY FLUID CELL COUNT: CPT | Performed by: STUDENT IN AN ORGANIZED HEALTH CARE EDUCATION/TRAINING PROGRAM

## 2017-07-26 PROCEDURE — 96376 TX/PRO/DX INJ SAME DRUG ADON: CPT

## 2017-07-26 PROCEDURE — 25000125 ZZHC RX 250: Performed by: STUDENT IN AN ORGANIZED HEALTH CARE EDUCATION/TRAINING PROGRAM

## 2017-07-26 PROCEDURE — 25000125 ZZHC RX 250: Performed by: RADIOLOGY

## 2017-07-26 PROCEDURE — 96372 THER/PROPH/DIAG INJ SC/IM: CPT

## 2017-07-26 PROCEDURE — 25000128 H RX IP 250 OP 636: Performed by: FAMILY MEDICINE

## 2017-07-26 PROCEDURE — G0378 HOSPITAL OBSERVATION PER HR: HCPCS

## 2017-07-26 PROCEDURE — 25000132 ZZH RX MED GY IP 250 OP 250 PS 637: Performed by: STUDENT IN AN ORGANIZED HEALTH CARE EDUCATION/TRAINING PROGRAM

## 2017-07-26 RX ORDER — DEXAMETHASONE SODIUM PHOSPHATE 4 MG/ML
4 INJECTION, SOLUTION INTRA-ARTICULAR; INTRALESIONAL; INTRAMUSCULAR; INTRAVENOUS; SOFT TISSUE ONCE
Status: COMPLETED | OUTPATIENT
Start: 2017-07-26 | End: 2017-07-26

## 2017-07-26 RX ORDER — DIAZEPAM 2 MG
2 TABLET ORAL ONCE
Status: COMPLETED | OUTPATIENT
Start: 2017-07-26 | End: 2017-07-26

## 2017-07-26 RX ORDER — KETOROLAC TROMETHAMINE 10 MG/1
20 TABLET, FILM COATED ORAL EVERY 4 HOURS PRN
Status: DISCONTINUED | OUTPATIENT
Start: 2017-07-26 | End: 2017-07-27 | Stop reason: HOSPADM

## 2017-07-26 RX ORDER — ONDANSETRON 4 MG/1
4 TABLET, ORALLY DISINTEGRATING ORAL EVERY 6 HOURS PRN
Status: DISCONTINUED | OUTPATIENT
Start: 2017-07-26 | End: 2017-07-27 | Stop reason: HOSPADM

## 2017-07-26 RX ORDER — CETIRIZINE HYDROCHLORIDE 10 MG/1
10 TABLET ORAL DAILY
Status: DISCONTINUED | OUTPATIENT
Start: 2017-07-26 | End: 2017-07-27 | Stop reason: HOSPADM

## 2017-07-26 RX ORDER — DIPHENHYDRAMINE HCL 25 MG
25 CAPSULE ORAL EVERY 8 HOURS PRN
Status: DISCONTINUED | OUTPATIENT
Start: 2017-07-26 | End: 2017-07-27 | Stop reason: HOSPADM

## 2017-07-26 RX ORDER — LIDOCAINE HYDROCHLORIDE 10 MG/ML
30 INJECTION, SOLUTION EPIDURAL; INFILTRATION; INTRACAUDAL; PERINEURAL ONCE
Status: COMPLETED | OUTPATIENT
Start: 2017-07-26 | End: 2017-07-26

## 2017-07-26 RX ORDER — POLYETHYLENE GLYCOL 3350 17 G/17G
17 POWDER, FOR SOLUTION ORAL DAILY PRN
Status: DISCONTINUED | OUTPATIENT
Start: 2017-07-26 | End: 2017-07-27 | Stop reason: HOSPADM

## 2017-07-26 RX ORDER — METOCLOPRAMIDE HYDROCHLORIDE 5 MG/ML
10 INJECTION INTRAMUSCULAR; INTRAVENOUS EVERY 8 HOURS
Status: DISCONTINUED | OUTPATIENT
Start: 2017-07-26 | End: 2017-07-27 | Stop reason: HOSPADM

## 2017-07-26 RX ADMIN — OMEPRAZOLE 20 MG: 20 CAPSULE, DELAYED RELEASE ORAL at 09:20

## 2017-07-26 RX ADMIN — ONDANSETRON 2 MG: 2 INJECTION INTRAMUSCULAR; INTRAVENOUS at 02:47

## 2017-07-26 RX ADMIN — ONDANSETRON 4 MG: 4 TABLET, ORALLY DISINTEGRATING ORAL at 19:03

## 2017-07-26 RX ADMIN — KETOROLAC TROMETHAMINE 30 MG: 30 INJECTION, SOLUTION INTRAMUSCULAR; INTRAVENOUS at 13:22

## 2017-07-26 RX ADMIN — SODIUM CHLORIDE 1000 ML: 9 INJECTION, SOLUTION INTRAVENOUS at 11:13

## 2017-07-26 RX ADMIN — DIPHENHYDRAMINE HYDROCHLORIDE 25 MG: 25 CAPSULE ORAL at 21:56

## 2017-07-26 RX ADMIN — PROMETHAZINE HYDROCHLORIDE 25 MG: 25 INJECTION INTRAMUSCULAR; INTRAVENOUS at 09:34

## 2017-07-26 RX ADMIN — PROMETHAZINE HYDROCHLORIDE 25 MG: 25 INJECTION INTRAMUSCULAR; INTRAVENOUS at 21:56

## 2017-07-26 RX ADMIN — ONDANSETRON 2 MG: 2 INJECTION INTRAMUSCULAR; INTRAVENOUS at 07:13

## 2017-07-26 RX ADMIN — DIHYDROERGOTAMINE MESYLATE 0.75 MG: 1 INJECTION, SOLUTION INTRAMUSCULAR; INTRAVENOUS; SUBCUTANEOUS at 02:13

## 2017-07-26 RX ADMIN — KETOROLAC TROMETHAMINE 30 MG: 30 INJECTION, SOLUTION INTRAMUSCULAR; INTRAVENOUS at 04:17

## 2017-07-26 RX ADMIN — LEVOTHYROXINE SODIUM 25 MCG: 25 TABLET ORAL at 09:20

## 2017-07-26 RX ADMIN — CETIRIZINE HYDROCHLORIDE 10 MG: 10 TABLET, FILM COATED ORAL at 11:13

## 2017-07-26 RX ADMIN — DIHYDROERGOTAMINE MESYLATE 0.75 MG: 1 INJECTION, SOLUTION INTRAMUSCULAR; INTRAVENOUS; SUBCUTANEOUS at 09:46

## 2017-07-26 RX ADMIN — DIAZEPAM 2 MG: 2 TABLET ORAL at 09:20

## 2017-07-26 RX ADMIN — TIZANIDINE 8 MG: 4 TABLET ORAL at 14:49

## 2017-07-26 RX ADMIN — METOCLOPRAMIDE 10 MG: 5 INJECTION, SOLUTION INTRAMUSCULAR; INTRAVENOUS at 18:18

## 2017-07-26 RX ADMIN — LIDOCAINE HYDROCHLORIDE 100 MG: 10 INJECTION, SOLUTION EPIDURAL; INFILTRATION; INTRACAUDAL; PERINEURAL at 10:23

## 2017-07-26 RX ADMIN — KETOROLAC TROMETHAMINE 20 MG: 10 TABLET, FILM COATED ORAL at 21:56

## 2017-07-26 RX ADMIN — DEXAMETHASONE SODIUM PHOSPHATE 4 MG: 4 INJECTION, SOLUTION INTRA-ARTICULAR; INTRALESIONAL; INTRAMUSCULAR; INTRAVENOUS; SOFT TISSUE at 16:40

## 2017-07-26 RX ADMIN — TIZANIDINE 8 MG: 4 TABLET ORAL at 21:56

## 2017-07-26 RX ADMIN — SODIUM CHLORIDE 1000 ML: 9 INJECTION, SOLUTION INTRAVENOUS at 20:18

## 2017-07-26 RX ADMIN — POLYETHYLENE GLYCOL 3350 17 G: 17 POWDER, FOR SOLUTION ORAL at 13:21

## 2017-07-26 RX ADMIN — PROMETHAZINE HYDROCHLORIDE 25 MG: 25 INJECTION INTRAMUSCULAR; INTRAVENOUS at 01:54

## 2017-07-26 RX ADMIN — DIHYDROERGOTAMINE MESYLATE 0.5 MG: 1 INJECTION, SOLUTION INTRAMUSCULAR; INTRAVENOUS; SUBCUTANEOUS at 18:18

## 2017-07-26 RX ADMIN — SODIUM CHLORIDE 1000 ML: 9 INJECTION, SOLUTION INTRAVENOUS at 02:26

## 2017-07-26 RX ADMIN — HYDROMORPHONE HYDROCHLORIDE 0.5 MG: 1 INJECTION, SOLUTION INTRAMUSCULAR; INTRAVENOUS; SUBCUTANEOUS at 11:35

## 2017-07-26 RX ADMIN — METOCLOPRAMIDE 10 MG: 5 INJECTION, SOLUTION INTRAMUSCULAR; INTRAVENOUS at 03:19

## 2017-07-26 RX ADMIN — METOCLOPRAMIDE 10 MG: 5 INJECTION, SOLUTION INTRAMUSCULAR; INTRAVENOUS at 11:46

## 2017-07-26 RX ADMIN — LORAZEPAM 1 MG: 2 INJECTION INTRAMUSCULAR; INTRAVENOUS at 04:31

## 2017-07-26 RX ADMIN — OMEPRAZOLE 20 MG: 20 CAPSULE, DELAYED RELEASE ORAL at 16:41

## 2017-07-26 ASSESSMENT — PAIN DESCRIPTION - DESCRIPTORS
DESCRIPTORS: HEADACHE
DESCRIPTORS: POUNDING;CONSTANT
DESCRIPTORS: ACHING;CONSTANT
DESCRIPTORS: POUNDING

## 2017-07-26 ASSESSMENT — ACTIVITIES OF DAILY LIVING (ADL): FALL_HISTORY_WITHIN_LAST_SIX_MONTHS: NO

## 2017-07-26 NOTE — PROGRESS NOTES
"[facility] [place]   Neurology Daily Note  Ada Welch  [id number]  07/26/2017    Subjective: Today Ms. Welch says that her migraine is as good as it has been this admission, having received dilaudid as well as zofran just prior to interview. Still, she is tearfully anxious throughout the interview, trying to find a reason for her symptoms. She says that she feels the DHE has taken the edge off her headache but not all that much. She is agreeable for another try at an opening pressure by IR this morning but is anxious about uncertainty moving forward as to why her migraines are worse.    Objective   /66  Pulse 52  Temp 96.5  F (35.8  C) (Oral)  Resp 16  Ht 1.702 m (5' 7\")  Wt 88.5 kg (195 lb 3.2 oz)  LMP 07/10/2017  SpO2 97%  Breastfeeding? No  BMI 30.57 kg/m2  General: Anxious affect. Intermittently tearful throughout.   Neuro:  Mental status: Alert and oriented x4  Cranial nerves: II-XII intact bilaterally  Motor: 5/5 throughout  Reflexes: Normal patellar  Sensory: to light touch throughout  Coordination: Not assessed  Gait: not assessed    Investigations    LP attempted yesterday; Radiology performed LP under X-ray guidance; the opening pressure was 16.4 cm.    Assessment and Plan   This is a 46 yoF with hx of Tom Danlos, Chronic Pain and Migraines who is inpatient with migraine since July 4. She is currently being treated with DHE protocol with some improvement. Consideration for low pressure headache prompted an LP which had normal opening pressure at 16.4 cm. Also some concern for medication headache as she is on numerous medications chronically. Plan will be to complete DHE protocol, treat symptomatically and discharge for close outpatient followup.    #Migraine  -Currently on DHE protocol    #Chronic pain  Patient follows with pain management as an outpatient. Will continue home medications    #Hypothyroid  -Continue levothyroxine 25mcg qam    Patient was " seen and discussed with Dr. Wojciech Kaplan, MS-4  [facility]    Note created in collaboration with Robb Kaplan. I have personally reviewed the note and made any necessary changes; I have made necessary changes to the physical exam and assessment/plan.    Oli Shah MD  Neurology Resident, PGY-2  (P) [phone]

## 2017-07-26 NOTE — PLAN OF CARE
Problem: Pain, Acute (Adult)  Goal: Identify Related Risk Factors and Signs and Symptoms  Related risk factors and signs and symptoms are identified upon initiation of Human Response Clinical Practice Guideline (CPG)   Outcome: No Change  Had her DHE dose for her headache. She stated that she did not think she got it because she did not fell the side effects like she did when she go the first dose. She had an LP and the site is CDI. Reglan, Phenergan, and Zofran for nausea with some relief. Dilaudid, Reglan, and Tor tyler for her headache with some relief. Doctors would like to send her home tomorrow if her symptoms improve.

## 2017-07-26 NOTE — PLAN OF CARE
Problem: Goal Outcome Summary  Goal: Goal Outcome Summary  Outcome: No Change  Observation Goal:  Patient's headache will be under control: Headache persists but is less intense s/p DHE test dose per pt statement

## 2017-07-26 NOTE — PROGRESS NOTES
Continues with intermittent head pain and nausea. Given pain medications and anti nausea medications with some relief.

## 2017-07-26 NOTE — PLAN OF CARE
Problem: Goal Outcome Summary  Goal: Goal Outcome Summary  Outcome: No Change  Patient very slightly improved headache after DHE given. Float float assisted with administration. Heart rate declined into 40s briefly and bp slightly elevated but parameters not met for  discontinuing DHE -bp need to be greater than 165/95 for medication to be stopped . Continuous nausea continues- Ativan. Phenergan reglan and zofran given ATC- Not much relief. Up with sba twice thus far.

## 2017-07-26 NOTE — PLAN OF CARE
Problem: Goal Outcome Summary  Goal: Goal Outcome Summary  Outcome: No Change  VSS at this time, pt resting s/p DHE test dose this evening. After initiation of DHE dose, pt experienced rise in BP to max of 147/89 and dips in HR with lowest being 45. Use of q 15min VS and continuous pulse oximeter implemented with upper limits on BP being 165/95 and HR lower limit of 45 with frequent rounding. Use of anti-emetics as pre-med and rescue med in place and pt stated hat it helped. Appetite fair and UOP WDL, continue to have stand-by assistance. Continue to monitor with frequent rounding and q 4hr observation goal notation.

## 2017-07-26 NOTE — PLAN OF CARE
Problem: Goal Outcome Summary  Goal: Goal Outcome Summary  Outcome: No Change  Patient goal for control of headache pain has not been met. Second dose of dhe  Has not changed headache. Dilaudid iv make headache bearable but still present.

## 2017-07-26 NOTE — PROVIDER NOTIFICATION
Pt requesting that Neuro on-call come to assess. Pt stated to writer that headache has changed and increased significantly over past hour. Notified neuro team at [phone] with night nurse call-back number.

## 2017-07-27 ENCOUNTER — CARE COORDINATION (OUTPATIENT)
Dept: CARE COORDINATION | Facility: CLINIC | Age: 47
End: 2017-07-27

## 2017-07-27 ENCOUNTER — MYC MEDICAL ADVICE (OUTPATIENT)
Dept: INTERNAL MEDICINE | Facility: CLINIC | Age: 47
End: 2017-07-27

## 2017-07-27 VITALS
DIASTOLIC BLOOD PRESSURE: 74 MMHG | WEIGHT: 195.5 LBS | TEMPERATURE: 97.5 F | OXYGEN SATURATION: 100 % | RESPIRATION RATE: 18 BRPM | BODY MASS INDEX: 30.69 KG/M2 | SYSTOLIC BLOOD PRESSURE: 128 MMHG | HEIGHT: 67 IN | HEART RATE: 93 BPM

## 2017-07-27 PROCEDURE — G0378 HOSPITAL OBSERVATION PER HR: HCPCS

## 2017-07-27 PROCEDURE — 25000125 ZZHC RX 250: Performed by: STUDENT IN AN ORGANIZED HEALTH CARE EDUCATION/TRAINING PROGRAM

## 2017-07-27 PROCEDURE — 25000128 H RX IP 250 OP 636: Performed by: STUDENT IN AN ORGANIZED HEALTH CARE EDUCATION/TRAINING PROGRAM

## 2017-07-27 PROCEDURE — 25000125 ZZHC RX 250

## 2017-07-27 PROCEDURE — 25000132 ZZH RX MED GY IP 250 OP 250 PS 637: Performed by: STUDENT IN AN ORGANIZED HEALTH CARE EDUCATION/TRAINING PROGRAM

## 2017-07-27 PROCEDURE — 96376 TX/PRO/DX INJ SAME DRUG ADON: CPT

## 2017-07-27 PROCEDURE — 40000556 ZZH STATISTIC PERIPHERAL IV START W US GUIDANCE

## 2017-07-27 RX ORDER — ONDANSETRON 4 MG/1
4-8 TABLET, ORALLY DISINTEGRATING ORAL EVERY 8 HOURS PRN
Qty: 20 TABLET | Refills: 1 | Status: SHIPPED | OUTPATIENT
Start: 2017-07-27 | End: 2017-09-12

## 2017-07-27 RX ORDER — RIZATRIPTAN BENZOATE 5 MG/1
5-10 TABLET, ORALLY DISINTEGRATING ORAL
Qty: 20 TABLET | Refills: 0 | Status: SHIPPED | OUTPATIENT
Start: 2017-07-27 | End: 2017-08-15

## 2017-07-27 RX ORDER — METHYLPREDNISOLONE 4 MG
TABLET, DOSE PACK ORAL
Qty: 21 TABLET | Refills: 0 | Status: SHIPPED | OUTPATIENT
Start: 2017-07-27 | End: 2017-09-12

## 2017-07-27 RX ADMIN — PROMETHAZINE HYDROCHLORIDE 25 MG: 25 INJECTION INTRAMUSCULAR; INTRAVENOUS at 06:24

## 2017-07-27 RX ADMIN — TIZANIDINE 8 MG: 4 TABLET ORAL at 07:03

## 2017-07-27 RX ADMIN — KETOROLAC TROMETHAMINE 20 MG: 10 TABLET, FILM COATED ORAL at 05:35

## 2017-07-27 RX ADMIN — OMEPRAZOLE 20 MG: 20 CAPSULE, DELAYED RELEASE ORAL at 08:27

## 2017-07-27 RX ADMIN — CETIRIZINE HYDROCHLORIDE 10 MG: 10 TABLET, FILM COATED ORAL at 08:27

## 2017-07-27 RX ADMIN — KETOROLAC TROMETHAMINE 20 MG: 10 TABLET, FILM COATED ORAL at 10:16

## 2017-07-27 RX ADMIN — LEVOTHYROXINE SODIUM 25 MCG: 25 TABLET ORAL at 08:27

## 2017-07-27 RX ADMIN — DIPHENHYDRAMINE HYDROCHLORIDE 25 MG: 25 CAPSULE ORAL at 08:44

## 2017-07-27 RX ADMIN — MAGNESIUM SULFATE HEPTAHYDRATE 500 MG: 500 INJECTION, SOLUTION INTRAMUSCULAR; INTRAVENOUS at 08:28

## 2017-07-27 RX ADMIN — DIHYDROERGOTAMINE MESYLATE 0.75 MG: 1 INJECTION, SOLUTION INTRAMUSCULAR; INTRAVENOUS; SUBCUTANEOUS at 02:35

## 2017-07-27 RX ADMIN — MAGNESIUM SULFATE HEPTAHYDRATE 500 MG: 500 INJECTION, SOLUTION INTRAMUSCULAR; INTRAVENOUS at 00:28

## 2017-07-27 RX ADMIN — ONDANSETRON 4 MG: 4 TABLET, ORALLY DISINTEGRATING ORAL at 05:35

## 2017-07-27 RX ADMIN — METOCLOPRAMIDE 10 MG: 5 INJECTION, SOLUTION INTRAMUSCULAR; INTRAVENOUS at 10:21

## 2017-07-27 RX ADMIN — METOCLOPRAMIDE 10 MG: 5 INJECTION, SOLUTION INTRAMUSCULAR; INTRAVENOUS at 02:10

## 2017-07-27 ASSESSMENT — PAIN DESCRIPTION - DESCRIPTORS: DESCRIPTORS: HEADACHE

## 2017-07-27 NOTE — PLAN OF CARE
Problem: Goal Outcome Summary  Goal: Goal Outcome Summary  Outcome: Adequate for Discharge Date Met:  07/27/17     A&Ox4. VSS ex. Baseline bradycardia.

## 2017-07-27 NOTE — PLAN OF CARE
Problem: Goal Outcome Summary  Goal: Goal Outcome Summary  Outcome: No Change  Observation goals:       Patient's headache will be under control: Pt. still complaining of a throbbing headache. Pt. reports tightness in the back of head. Pt. reports intermittent nausea with the headache.   Patient will have a lumbar puncture: Lumbar puncture completed on 7/25/17.

## 2017-07-27 NOTE — PROGRESS NOTES
"[facility]  \"Hello, my name is Uma Pond , and I am calling from the [facility].  I want to check in and see how you are doing, after leaving the hospital.  You may also receive a call from your Care Coordinator (care team), but I want to make sure you don t have any urgent needs.  I have a couple questions to review with you:     Post-Discharge Outreach                                                    Ada Welch is a 46 year old female     Follow-up Appointments           Follow Up and recommended labs and tests         Follow up with Fara Varner for headaches.  Follow up with your PCP within the next week for a post-hospital follow up.  Follow up with pain medicine as previously scheduled.  Follow up with neurosurgery as previously scheduled.                        Your next 10 appointments already scheduled            Aug 07, 2017  3:40 PM CDT   (Arrive by 3:25 PM)   Return Visit with Faiza Martinez MD   [facility] ([facility])     [address] [place] [address]  [address]th [facility] [id number] [phone]                  Aug 14, 2017  8:30 AM CDT   (Arrive by 8:15 AM)   New Patient Visit with FIDELINA Ridley [facility] ([facility])     [address] [place] [address]  3rd [facility] [id number] [phone]                  Aug 30, 2017 11:00 AM CDT   (Arrive by 10:45 AM)   NEW FEMALE PELVIC with Wade Singh MD   [facility] ([facility])     [address] [place] [address]  4th [facility] [id number] [phone]                  Aug 30, 2017  1:30 PM CDT   (Arrive by 1:15 PM)   New Patient Visit with Rey Tello MD   [facility] ([facility])     [address] [place] [address]  3rd [facility] [address] "   [phone]                  Aug 30, 2017  3:00 PM CDT   (Arrive by 2:45 PM)   New Patient Visit with Zoe Barahona MD   [facility] ([facility])     [address] [facility]  4th Floor  [facility] [id number] [phone]                  Sep 12, 2017  9:05 AM CDT   (Arrive by 8:50 AM)   New Patient Visit with Jerad Hebert MD   [facility] ([facility])     [address]  2nd Floor  [facility] [id number] [phone]              Care Team:    Patient Care Team       Relationship Specialty Notifications Start End    BryoneaEllyn hernandez MD PCP - General Internal Medicine  7/24/17     Phone: [phone] Fax: [phone]         [facility] [address] [facility] [address] [facility] [address]    Darrell Enciso MD MD Orthopaedic Surgery  7/8/16     Phone: [phone] Fax: [phone]          [facility] [address] [facility] [address]    Faiza Martinez MD MD Anesthesiology  9/7/16     Phone: [phone] Pager: [phone] Fax: [phone]        [facility] [address] [facility] [address]    Higinio Landers MD MD Neurology  7/7/17     Phone: [phone] Fax: [phone]         [facility] NEUROLOGY [address] [place] [address]    Zoe Barahona MD MD Otolaryngology  7/18/17     Phone: [phone] Fax: [phone]          [facility] [address] [facility] [address] [facility] [address]    Wade Singh MD MD Urology  7/19/17     Phone: [phone] Fax: [phone]         [facility] [address] [facility] [address]    Marbella Stewart, RN Registered Nurse Urology  7/19/17     Phone: [phone] Pager: [phone]                Transition of Care Review                                                      Did you have a surgery or procedure during your hospital visit? No   If yes, do you have any of the following:  "    Signs of infection: NO    Pain:  Yes     Pain Scale (0-10) 7/10     Location: Migraine    Wound/incision concerns? NO    Do you have all of your medications/refills? NO- patient is wondering \"Dr. Shah wrote a prescription for more rizatriptan, but the insurance company wouldn't refill it yet, so he wrote a prescription for 4 50mg tablets of sumatriptan. What should I do if these do not last until I see my pain Dr. Martinez on August 7th like they're hoping they will?\"    Are you having any side effects or questions about your medication(s)? No    Do you have any new or worsening symptoms?  No    Do you have any future appointments scheduled?   Yes             Plan                                                      Thanks for your time.  Your Care Coordinator may follow-up within the next couple days.  In the meantime if you have questions, concerns or problems call your care team.        Uma Pond    "

## 2017-07-27 NOTE — PLAN OF CARE
Problem: Goal Outcome Summary  Goal: Goal Outcome Summary  Outcome: No Change  VSS, afebrile, pain persists in head with tension and nausea. Pt gets some relief with various treatments but cannot sustain this relief. She stated that she is very worried about leaving tomorrow if headache is still present. Continue with frequent monitoring and careful administration of DHE as pt can have a max of 2mg in 24 period.    Pt is c/o pain at site of IV insertion, putting in for vascular consult.

## 2017-07-27 NOTE — PLAN OF CARE
Problem: Goal Outcome Summary  Goal: Goal Outcome Summary  Outcome: Adequate for Discharge Date Met:  07/27/17     A&Ox4. VSS on RA ex. baseline bradycardia. Pt moves independendtly, ambulated to bathroom, voiding spontaneously. Pt c/o of headache and nausea, received toradol and benadryl. Pt status adequate for discharge. PIV removed. Discharge instructions reviewed with pt and spouse, full understanding verbalized. New medications to be picked up at discharge pharmacy by pt and spouse. All pt belonging with pt. Pt transporting to home with spouse. Pt left unit by wheelchair with spouse. Pt safely off the unit.

## 2017-07-27 NOTE — DISCHARGE SUMMARY
"[facility], [place]    Neurology Discharge Summary    Date of Admission: 7/24/2017  Date of Discharge: July 27, 2017    Disposition: Discharged to home  Primary Care Physician: Ellyn Rivera     Admission Diagnosis:   Ehler Danlos syndrome  Migraine headaches  Chronic pain disorder    Discharge Diagnosis:   Status migrainosus  Ehler Danlos  History of migraines  Chronic pain disorder      Problem Leading to Hospitalization (from [facility]):   Mrs. Welch is a pleasant 46 year old female with PMH significant for Ehler Danlos syndrome who presented to the hospital with 3 weeks of headaches. She     Please see H&P dated for further details about presentation.    Brief Hospital Course:   Mrs. Welch has a history of Ehler danlos syndrome and therefore, she was worked up thoroughly for dissection or soft tissue injury as the cause of her continued headaches. We completed a MRI/MRA of the brain, a venogram and an MRI of the cervical spine. These studies were only notable for mild to moderate degenerative changes from C4-C7. There was no syrinx visualized as was previously recognized on studies dating back several years. She received many medications while in the ED including depakote, toradol, gabapentin, dilaudid, decadron, benadryl, reglan, phenergan and magnesium. Her headaches continued to bother her and therefore she was admitted for further management.    The next morning she was started on the DHE protocol. More information was gathered and she noted a worsening of her headache with postural changes. We postulated that this migraine could be a low pressure headache and elected to undergo an LP. The LP opening pressure was 16 and within normal limits.     She stated that the DHE did seem to \"take the edge off\" for several hours, but the pain does return. She was given multiple doses of diphenhydramine, toradol, magnesium, reglan, zofran, phenergan, tizanidine and one dose of decadron " along with the DHE protocol. These medications all seemed to work for several hours but we were unable to achieve pain-free while inpatient.    She has follow up with headache specialist, Fara Varner on August 14 at 830AM. She sees neurosurgery in [facility] in August. She has follow up with her pain management team in about 2 weeks. She is to continue her other pain management medications. Since she is seeing pain management, we will elect not to start any other long term pain management medications at this time and defer to her outpatient team who knows her very well. To bridge her to these appointments, we will give her a medrol dosepak over the next 5 days and zofran for symptomatic treatment. She is encouraged to maintain proper sleep/wake cycles, proper hydration and reduce stress where possible.    Consideration should be given to an occipital nerve injection as an outpatient since her migraines seem to stem from this nerve.       PERTINENT INVESTIGATIONS    Labs  Lab Results   Component Value Date    WBC 5.7 07/24/2017     Lab Results   Component Value Date    RBC 4.41 07/24/2017     Lab Results   Component Value Date    HGB 13.5 07/24/2017     Lab Results   Component Value Date    HCT 39.5 07/24/2017     No components found for: MCT  Lab Results   Component Value Date    MCV 90 07/24/2017     Lab Results   Component Value Date    MCH 30.6 07/24/2017     Lab Results   Component Value Date    MCHC 34.2 07/24/2017     Lab Results   Component Value Date    RDW 12.6 07/24/2017     Lab Results   Component Value Date     07/24/2017     Last Basic Metabolic Panel:  Lab Results   Component Value Date     07/24/2017      Lab Results   Component Value Date    POTASSIUM 4.0 07/24/2017     Lab Results   Component Value Date    CHLORIDE 110 07/24/2017     Lab Results   Component Value Date    JUAN 8.9 07/24/2017     Lab Results   Component Value Date    CO2 27 07/24/2017     Lab Results   Component  Value Date    BUN 8 07/24/2017     Lab Results   Component Value Date    CR 0.85 07/24/2017     Lab Results   Component Value Date    GLC 88 07/24/2017         Imaging: Impression:   1. No abnormal enhancement in the spinal cord, thecal sac or cervical  vertebrae.  2. Unchanged mild to moderate degenerative changes, most prominent  C4-C7. Moderate right neuroforaminal narrowing at C5-C6.       PHYSICAL EXAMINATION  Constitutional : well-built  Head: atraumatic, anicteric. See neuroexam  Eyes: see neuroexam  CVC: S1/S2 sinusal no murmurs  LUng: Clear. On room air. No respiratory distress.   Adomen: soft, non tender, bowel movements heard  Extremities: Pulses preserved in four extremities. No edema. Well perfused  Psychiatry: in good mood. Collaborative  Neuroexam  Alert and oriented to place, date, self and reasons of hospitalization.  Follow commands  Face symmetric  Eyes: motility preserved, no nystagmus  Tongue centered  No tremors  No dysmetria (arms and legs tested)  Strength preserved on arms and legs , proximally and distally  Reflexes preserved  Sensory exam to light touch: preserved   No aphasia  No dysarthria    Additional recommendations and follow up:       Review of your medicines      START taking       Dose / Directions    methylPREDNISolone 4 MG tablet   Commonly known as:  MEDROL DOSEPAK   Used for:  Intractable headache, unspecified chronicity pattern, unspecified headache type        Follow package instructions   Quantity:  21 tablet   Refills:  0         CONTINUE these medicines which may have CHANGED, or have new prescriptions. If we are uncertain of the size of tablets/capsules you have at home, strength may be listed as something that might have changed.       Dose / Directions    * ondansetron 4 MG ODT tab   Commonly known as:  ZOFRAN-ODT   This may have changed:  Another medication with the same name was added. Make sure you understand how and when to take each.        Dose:  4 mg   4 mg  every 12 hours as needed   Refills:  0       * ondansetron 4 MG ODT tab   Commonly known as:  ZOFRAN-ODT   This may have changed:  Another medication with the same name was added. Make sure you understand how and when to take each.   Used for:  Migraine without status migrainosus, not intractable, unspecified migraine type        Dose:  4-8 mg   Take 1-2 tablets (4-8 mg) by mouth every 12 hours as needed for nausea   Quantity:  60 tablet   Refills:  1       * ondansetron 4 MG ODT tab   Commonly known as:  ZOFRAN ODT   This may have changed:  You were already taking a medication with the same name, and this prescription was added. Make sure you understand how and when to take each.   Used for:  Intractable headache, unspecified chronicity pattern, unspecified headache type        Dose:  4-8 mg   Take 1-2 tablets (4-8 mg) by mouth every 8 hours as needed for nausea   Quantity:  20 tablet   Refills:  1       * Notice:  This list has 3 medication(s) that are the same as other medications prescribed for you. Read the directions carefully, and ask your doctor or other care provider to review them with you.      CONTINUE these medicines which have NOT CHANGED       Dose / Directions    ADVAIR DISKUS 250-50 MCG/DOSE diskus inhaler   Generic drug:  fluticasone-salmeterol        Refills:  0       buprenorphine 10 MCG/HR WK patch   Commonly known as:  BUTRANS   Used for:  Postlaminectomy syndrome of lumbar region        Dose:  1 patch   Place 1 patch onto the skin every 7 days   Quantity:  4 patch   Refills:  3       cetirizine 10 MG tablet   Commonly known as:  zyrTEC        Refills:  0       FLUTICASONE FUROATE NA        Dose:  50 mcg   Spray 50 mcg in nostril At Bedtime   Refills:  0       ketamine (KETALAR) 5%-gabapentin (NEURONTIN) 8%-lidocaine 2.5% 5%-8% Gel topical PLO cream   Used for:  Postlaminectomy syndrome of lumbosacral region, Postlaminectomy syndrome of lumbar region        Dose:  1 g   Apply 1 g topically 3  times daily as needed   Quantity:  30 g   Refills:  3       levothyroxine 25 mcg/mL Susp   Commonly known as:  SYNTHROID        Dose:  25 mcg   25 mcg every morning   Refills:  0       lidocaine 5 % Patch   Commonly known as:  LIDODERM   Used for:  Post laminectomy syndrome        Apply up to 3 patches to painful area at once for up to 12 h within a 24 h period.  Remove after 12 hours.   Quantity:  30 patch   Refills:  1       MULTIVITAMIN PO        Take by mouth daily   Refills:  0       omeprazole 20 MG CR capsule   Commonly known as:  priLOSEC        Dose:  20 mg   20 mg 2 times daily   Refills:  0       polyethylene glycol powder   Commonly known as:  MIRALAX/GLYCOLAX        Refills:  0       PROBIOTIC DAILY PO        Take by mouth 2 times daily   Refills:  0       rizatriptan 5 MG ODT tab   Commonly known as:  MAXALT-MLT   Used for:  Migraine without status migrainosus, not intractable, unspecified migraine type        Dose:  5-10 mg   Take 1-2 tablets (5-10 mg) by mouth at onset of headache for migraine (max 30 mg in 24 hours)   Quantity:  20 tablet   Refills:  0       tiZANidine 4 MG tablet   Commonly known as:  ZANAFLEX   Used for:  Post laminectomy syndrome        Dose:  8 mg   Take 2 tablets (8 mg) by mouth 3 times daily as needed for muscle spasms   Quantity:  90 tablet   Refills:  3            Where to get your medicines      Some of these will need a paper prescription and others can be bought over the counter. Ask your nurse if you have questions.     Bring a paper prescription for each of these medications      methylPREDNISolone 4 MG tablet     ondansetron 4 MG ODT tab     rizatriptan 5 MG ODT tab             Neurology Adult Referral     Neurology Adult Referral     Reason for your hospital stay   Migraine, intractable.     Follow Up and recommended labs and tests   Follow up with Fara Varner for headaches.  Follow up with your PCP within the next week for a post-hospital follow up.  Follow up  with pain medicine as previously scheduled.  Follow up with neurosurgery as previously scheduled.     Activity   Your activity upon discharge: activity as tolerated     Discharge Instructions   Take medrol dosepak as prescribed.  Take zofran as needed.  Follow up as above.     Full Code     Diet   Follow this diet upon discharge: Orders Placed This Encounter     Combination Diet Regular Diet Adult         Follow up with Fara Varner scheduled on August 14 at 830AM.      Patient was seen and discussed with Dr. Wojciech Shah MD  Neurology resident, PGY-2  (P) [phone]

## 2017-07-27 NOTE — PLAN OF CARE
Problem: Goal Outcome Summary  Goal: Goal Outcome Summary  Outcome: No Change  .B/P: 133/79, T: 96.7, HR: 36, R: 18 After DHE was given. MD notifed about low HR. HR from 3063-0683 now in the 60's-70's. Pt. c/o persistent headache. Tordol given x1 and Zanaflex x1 for some relief. Pt. reported tightness in the back of her head. Ice packs applied. HOB flat for comfort. Scheduled Reglan, Zofran and Phenergan given for nausea. Up with SBA, pt. Reports dizziness when walking to the bathroom. No acute events. Pt. slept comfortably between cares. Will continue POC and notify MD with changes.

## 2017-07-28 ENCOUNTER — NURSE TRIAGE (OUTPATIENT)
Dept: NURSING | Facility: CLINIC | Age: 47
End: 2017-07-28

## 2017-07-28 ENCOUNTER — MYC MEDICAL ADVICE (OUTPATIENT)
Dept: INTERNAL MEDICINE | Facility: CLINIC | Age: 47
End: 2017-07-28

## 2017-07-28 DIAGNOSIS — G43.709 CHRONIC MIGRAINE WITHOUT AURA WITHOUT STATUS MIGRAINOSUS, NOT INTRACTABLE: Primary | ICD-10-CM

## 2017-07-28 DIAGNOSIS — G43.909 MIGRAINE WITHOUT STATUS MIGRAINOSUS, NOT INTRACTABLE, UNSPECIFIED MIGRAINE TYPE: ICD-10-CM

## 2017-07-28 NOTE — TELEPHONE ENCOUNTER
I spoke more with he patient regarding this.  She is having trouble getting rizatriptan covered because she has used the max monthly allowance. She will contact the pharmacy to see how much it would cost to pay out of pocket.  She is worried that she won't have enough sumatriptan to get through the weekend.  She has one tablet left.  She is on day 2 of medrol dosepak and is not having any adverse effects and also does not have any relief of her migraine.    Uma Watts LPN

## 2017-07-28 NOTE — TELEPHONE ENCOUNTER
"\"I cannot afford the new order for Maxalt.  I was able to pay cash for 2 tablets to get me through the weekend.  I am hoping the doctor will change the order to something else.  I believe I had some luck with Imitrex injections in the past.\"  Will send message to pcp.     Jeanne Dietz RN/FNA    "

## 2017-07-31 RX ORDER — CEFUROXIME AXETIL 250 MG/1
6 TABLET ORAL
Qty: 2 KIT | Refills: 1 | Status: SHIPPED | OUTPATIENT
Start: 2017-07-31 | End: 2018-07-09

## 2017-07-31 NOTE — TELEPHONE ENCOUNTER
Patient notified of the rx and will contact clinic if anything changes or worsens.  Uma Watts LPN

## 2017-08-02 RX ORDER — RIZATRIPTAN BENZOATE 5 MG/1
TABLET, ORALLY DISINTEGRATING ORAL
Qty: 20 TABLET | Refills: 0 | OUTPATIENT
Start: 2017-08-02

## 2017-08-02 ASSESSMENT — ENCOUNTER SYMPTOMS
SHORTNESS OF BREATH: 0
COUGH DISTURBING SLEEP: 0
TACHYCARDIA: 1
BACK PAIN: 1
MUSCLE WEAKNESS: 1
WEIGHT LOSS: 0
FATIGUE: 1
TACHYCARDIA: 1
EYE WATERING: 1
HALLUCINATIONS: 0
LOSS OF CONSCIOUSNESS: 1
LEG PAIN: 1
NIGHT SWEATS: 1
LIGHT-HEADEDNESS: 1
PALPITATIONS: 1
EYE WATERING: 1
WHEEZING: 0
ABDOMINAL PAIN: 0
NECK MASS: 1
NAIL CHANGES: 0
RESPIRATORY PAIN: 0
DIFFICULTY URINATING: 1
EYE IRRITATION: 1
EXERCISE INTOLERANCE: 1
NIGHT SWEATS: 1
DYSURIA: 0
FEVER: 1
INCREASED ENERGY: 1
CHILLS: 1
ORTHOPNEA: 0
JAUNDICE: 0
LEG PAIN: 1
DIARRHEA: 1
MEMORY LOSS: 0
HYPOTENSION: 1
SEIZURES: 0
HYPERTENSION: 1
NECK PAIN: 1
DIARRHEA: 1
SINUS CONGESTION: 1
WEIGHT LOSS: 0
CLAUDICATION: 0
ALTERED TEMPERATURE REGULATION: 1
DOUBLE VISION: 1
EXERCISE INTOLERANCE: 1
POOR WOUND HEALING: 1
WHEEZING: 0
SNORES LOUDLY: 1
DECREASED APPETITE: 1
SWOLLEN GLANDS: 1
RECTAL BLEEDING: 0
NAIL CHANGES: 0
TASTE DISTURBANCE: 0
SEIZURES: 0
SPUTUM PRODUCTION: 0
JOINT SWELLING: 1
HEADACHES: 1
WEAKNESS: 1
HOARSE VOICE: 1
HYPOTENSION: 1
ORTHOPNEA: 0
WEIGHT GAIN: 0
DYSPNEA ON EXERTION: 0
DIFFICULTY URINATING: 1
EYE IRRITATION: 1
COUGH: 1
CLAUDICATION: 0
NUMBNESS: 1
FATIGUE: 1
POLYPHAGIA: 0
NUMBNESS: 1
HEMOPTYSIS: 0
SPEECH CHANGE: 0
SNORES LOUDLY: 1
POLYDIPSIA: 1
COUGH: 1
SPEECH CHANGE: 0
SLEEP DISTURBANCES DUE TO BREATHING: 0
SLEEP DISTURBANCES DUE TO BREATHING: 0
RECTAL PAIN: 0
BLOOD IN STOOL: 0
MYALGIAS: 1
TREMORS: 0
EYE PAIN: 1
MEMORY LOSS: 0
BOWEL INCONTINENCE: 0
FEVER: 1
EYE PAIN: 1
SMELL DISTURBANCE: 0
JOINT SWELLING: 1
HEARTBURN: 1
CONSTIPATION: 1
COUGH DISTURBING SLEEP: 0
HEMATURIA: 0
HEARTBURN: 1
POSTURAL DYSPNEA: 0
TREMORS: 0
SKIN CHANGES: 0
MUSCLE CRAMPS: 0
VOMITING: 1
EYE REDNESS: 1
SINUS CONGESTION: 1
ARTHRALGIAS: 1
HEADACHES: 1
BLOATING: 1
STIFFNESS: 1
DISTURBANCES IN COORDINATION: 1
PARALYSIS: 0
BRUISES/BLEEDS EASILY: 1
SMELL DISTURBANCE: 0
SINUS PAIN: 1
INCREASED ENERGY: 1
POLYDIPSIA: 1
LEG SWELLING: 1
BACK PAIN: 1
RECTAL BLEEDING: 0
ARTHRALGIAS: 1
DECREASED APPETITE: 1
EYE REDNESS: 1
MYALGIAS: 1
HYPERTENSION: 1
TINGLING: 1
DYSPNEA ON EXERTION: 0
SYNCOPE: 1
BLOOD IN STOOL: 0
HOARSE VOICE: 1
MUSCLE WEAKNESS: 1
TROUBLE SWALLOWING: 1
HEMOPTYSIS: 0
PARALYSIS: 0
TINGLING: 1
POLYPHAGIA: 0
JAUNDICE: 0
SPUTUM PRODUCTION: 0
TROUBLE SWALLOWING: 1
HALLUCINATIONS: 0
NECK MASS: 1
LOSS OF CONSCIOUSNESS: 1
ALTERED TEMPERATURE REGULATION: 1
RECTAL PAIN: 0
NAUSEA: 1
LIGHT-HEADEDNESS: 1
ABDOMINAL PAIN: 0
WEIGHT GAIN: 0
POSTURAL DYSPNEA: 0
SKIN CHANGES: 0
SYNCOPE: 1
FLANK PAIN: 1
BOWEL INCONTINENCE: 0
CONSTIPATION: 1
WEAKNESS: 1
VOMITING: 1
SWOLLEN GLANDS: 1
NAUSEA: 1
BLOATING: 1
SORE THROAT: 1
DIZZINESS: 1
DISTURBANCES IN COORDINATION: 1
DIZZINESS: 1
CHILLS: 1
RESPIRATORY PAIN: 0
STIFFNESS: 1
PALPITATIONS: 1
SINUS PAIN: 1
SHORTNESS OF BREATH: 0
DOUBLE VISION: 1
TASTE DISTURBANCE: 0
SORE THROAT: 1
POOR WOUND HEALING: 1
NECK PAIN: 1
MUSCLE CRAMPS: 0
BRUISES/BLEEDS EASILY: 1
FLANK PAIN: 1
LEG SWELLING: 1
HEMATURIA: 0
DYSURIA: 0

## 2017-08-02 ASSESSMENT — ANXIETY QUESTIONNAIRES
GAD7 TOTAL SCORE: 0
1. FEELING NERVOUS, ANXIOUS, OR ON EDGE: NOT AT ALL
3. WORRYING TOO MUCH ABOUT DIFFERENT THINGS: NOT AT ALL
7. FEELING AFRAID AS IF SOMETHING AWFUL MIGHT HAPPEN: NOT AT ALL
GAD7 TOTAL SCORE: 0
7. FEELING AFRAID AS IF SOMETHING AWFUL MIGHT HAPPEN: NOT AT ALL
2. NOT BEING ABLE TO STOP OR CONTROL WORRYING: NOT AT ALL
6. BECOMING EASILY ANNOYED OR IRRITABLE: NOT AT ALL
5. BEING SO RESTLESS THAT IT IS HARD TO SIT STILL: NOT AT ALL
GAD7 TOTAL SCORE: 0
4. TROUBLE RELAXING: NOT AT ALL

## 2017-08-03 ASSESSMENT — ANXIETY QUESTIONNAIRES: GAD7 TOTAL SCORE: 0

## 2017-08-04 ENCOUNTER — PRE VISIT (OUTPATIENT)
Dept: NEUROLOGY | Facility: CLINIC | Age: 47
End: 2017-08-04

## 2017-08-04 ENCOUNTER — TELEPHONE (OUTPATIENT)
Dept: ANESTHESIOLOGY | Facility: CLINIC | Age: 47
End: 2017-08-04

## 2017-08-04 NOTE — TELEPHONE ENCOUNTER
1.  Date/reason for appt:8/14/17, Migraines   2.  Referring provider:HENRRY JONES  3.  Call to patient (Yes / No - short description):No, referred   4.  Previous care at / records requested from:   [facility]  5.  Other:

## 2017-08-04 NOTE — TELEPHONE ENCOUNTER
Reminder call placed to pt.   Pt reminded of Provider, date and time of the appointment.   Pt was asked to arrive 15 minutes early to fill out the questionnaires.   Clinic phone number provided if pt needed to reschedule.

## 2017-08-07 ENCOUNTER — OFFICE VISIT (OUTPATIENT)
Dept: ANESTHESIOLOGY | Facility: CLINIC | Age: 47
End: 2017-08-07

## 2017-08-07 VITALS
WEIGHT: 195 LBS | OXYGEN SATURATION: 97 % | HEART RATE: 91 BPM | BODY MASS INDEX: 31.34 KG/M2 | HEIGHT: 66 IN | SYSTOLIC BLOOD PRESSURE: 117 MMHG | DIASTOLIC BLOOD PRESSURE: 76 MMHG

## 2017-08-07 DIAGNOSIS — M96.1 POSTLAMINECTOMY SYNDROME OF LUMBAR REGION: ICD-10-CM

## 2017-08-07 DIAGNOSIS — G43.709 CHRONIC MIGRAINE WITHOUT AURA WITHOUT STATUS MIGRAINOSUS, NOT INTRACTABLE: ICD-10-CM

## 2017-08-07 RX ORDER — AMITRIPTYLINE HYDROCHLORIDE 10 MG/1
10 TABLET ORAL AT BEDTIME
Qty: 60 TABLET | Refills: 1 | Status: SHIPPED | OUTPATIENT
Start: 2017-08-07 | End: 2017-09-12

## 2017-08-07 RX ORDER — BUPRENORPHINE 10 UG/H
1 PATCH TRANSDERMAL
Qty: 4 PATCH | Refills: 0 | Status: SHIPPED | OUTPATIENT
Start: 2017-08-10 | End: 2017-09-06

## 2017-08-07 ASSESSMENT — PAIN SCALES - GENERAL: PAINLEVEL: SEVERE PAIN (7)

## 2017-08-07 NOTE — MR AVS SNAPSHOT
After Visit Summary   8/7/2017    Ada Welch    MRN: [id number]           Patient Information     Date Of Birth          1970        Visit Information        Provider Department      8/7/2017 3:40 PM Faiza Martinez MD [facility]        Today's Diagnoses     Chronic migraine without aura without status migrainosus, not intractable        Postlaminectomy syndrome of lumbar region          Care Instructions    1. Continue butrans patch    2. Start amitriptyline 10 tablet at night. After one week you can increase to 20 mg at night.    Follow up:    3 months      To speak with a nurse, schedule/reschedule/cancel a clinic appointment, or request a medication refill call: [phone]     You can also reach us by Deep-Secure: [email or web address]    For refills, please call on Monday, 1 week before your medication runs out. The doctors are not always in clinic, so this gives us time to get your prescriptions ready.  Please let us know the name of the medication you are requesting a refill of.                                   Follow-ups after your visit        Your next 10 appointments already scheduled     Aug 14, 2017  8:30 AM CDT   (Arrive by 8:15 AM)   [facility] with [facility] ([facility])    [address] [place] [id number] [phone]            Aug 30, 2017 11:00 AM CDT   (Arrive by 10:45 AM)   NEW FEMALE PELVIC with Wade Singh MD   [facility] ([facility])    [address] [place] [id number] [phone]            Aug 30, 2017  1:30 PM CDT   (Arrive by 1:15 PM)   New Patient Visit with Rey Tello MD   [facility] ([facility])    [address] [place] [address]  [id number]   [phone]            Aug 30, 2017  3:00 PM CDT   (Arrive by 2:45 PM)   New Patient Visit with Zoe Barahona MD   [facility] ([facility])    [address] [place] [address]  [address] [facility] [address]   [phone]            Sep 12, 2017  9:05 AM CDT   (Arrive by 8:50 AM)   New Patient Visit with Jerad Hebert MD   [facility] ([facility])    [address] [place] [address]  [address] [facility] [address]   [phone]            Sep 21, 2017 11:25 AM CDT   (Arrive by 11:10 AM)   Return Visit with Ellyn Rivera MD   [facility] ([facility])    [address] [place] [id number] [phone]              Who to contact     Please call your clinic at [phone] to:    Ask questions about your health    Make or cancel appointments    Discuss your medicines    Learn about your test results    Speak to your doctor   If you have compliments or concerns about an experience at your clinic, or if you wish to file a complaint, please contact [facility] Patient Relations at [phone] or email us at [email or web address]         Additional Information About Your Visit        [employer] Information     Stumpwiset gives you secure access to your electronic health record. If you see a primary care provider, you can also send messages to your care team and make appointments. If you have questions, please call your primary care clinic.  If you do not have a primary care provider, please call [phone] and they will assist you.      Loudie is an electronic gateway that provides easy, online access to your medical records. With Loudie, you can request a clinic appointment, read your test results, renew a prescription or communicate with your care team.     To access your existing account, please contact your  "[facility] or call [phone] for assistance.        Care EveryWhere ID     This is your Care EveryWhere ID. This could be used by other organizations to access your [facility] medical records  [id number]        Your Vitals Were     Pulse Height Last Period Pulse Oximetry BMI (Body Mass Index)       91 1.676 m (5' 6\") 07/10/2017 97% 31.47 kg/m2        Blood Pressure from Last 3 Encounters:   08/07/17 117/76   07/27/17 128/74   07/17/17 132/85    Weight from Last 3 Encounters:   08/07/17 88.5 kg (195 lb)   07/27/17 88.7 kg (195 lb 8 oz)   07/17/17 83.9 kg (185 lb)              Today, you had the following     No orders found for display         Today's Medication Changes          These changes are accurate as of: 8/7/17  4:44 PM.  If you have any questions, ask your nurse or doctor.               Start taking these medicines.        Dose/Directions    amitriptyline 10 MG tablet   Commonly known as:  ELAVIL   Used for:  Chronic migraine without aura without status migrainosus, not intractable   Started by:  Faiza Martinez MD        Dose:  10 mg   Take 1 tablet (10 mg) by mouth At Bedtime   Quantity:  60 tablet   Refills:  1            Where to get your medicines      These medications were sent to [facility] [place], [place] - [address] [facility] [phone]  [address] [facility] [address] [address]    Hours:  TRANSPLANT PHONE NUMBER [phone] Phone:  [phone]     amitriptyline 10 MG tablet         Some of these will need a paper prescription and others can be bought over the counter.  Ask your nurse if you have questions.     Bring a paper prescription for each of these medications     buprenorphine 10 MCG/HR WK patch                Primary Care Provider Office Phone # Fax #    Ellyn Rivera [phone]       [address] [place] [id number]        Equal Access to Services     CHRISTINA GUY AH: Hadii " kia Slater, warejida phyliciamagy, qashenata kakellen arellano, odette idiin hayjúniorjuanjose fergusonalan carylisamargrant rizzoTanyastanley clyde. So [facility] [phone].    ATENCIÓN: Si aroldola jimmie, tiene a sweeney disposición servicios gratuitos de asistencia lingüística. Isaías al [phone].    We comply with applicable federal civil rights laws and [place] laws. We do not discriminate on the basis of race, color, national origin, age, disability sex, sexual orientation or gender identity.            Thank you!     Thank you for choosing [facility]  for your care. Our goal is always to provide you with excellent care. Hearing back from our patients is one way we can continue to improve our services. Please take a few minutes to complete the written survey that you may receive in the mail after your visit with us. Thank you!             Your Updated Medication List - Protect others around you: Learn how to safely use, store and throw away your medicines at www.disposemymeds.org.          This list is accurate as of: 8/7/17  4:44 PM.  Always use your most recent med list.                   Brand Name Dispense Instructions for use Diagnosis    ADVAIR DISKUS 250-50 MCG/DOSE diskus inhaler   Generic drug:  fluticasone-salmeterol           amitriptyline 10 MG tablet    ELAVIL    60 tablet    Take 1 tablet (10 mg) by mouth At Bedtime    Chronic migraine without aura without status migrainosus, not intractable       buprenorphine 10 MCG/HR WK patch   Start taking on:  8/10/2017    BUTRANS    4 patch    Place 1 patch onto the skin every 7 days    Postlaminectomy syndrome of lumbar region       cetirizine 10 MG tablet    zyrTEC          FLUTICASONE FUROATE NA      Spray 50 mcg in nostril At Bedtime        ketamine (KETALAR) 5%-gabapentin (NEURONTIN) 8%-lidocaine 2.5% 5%-8% Gel topical PLO cream     30 g    Apply 1 g topically 3 times daily as needed    Postlaminectomy syndrome of lumbosacral region, Postlaminectomy  syndrome of lumbar region       levothyroxine 25 mcg/mL Susp    SYNTHROID     25 mcg every morning        lidocaine 5 % Patch    LIDODERM    30 patch    Apply up to 3 patches to painful area at once for up to 12 h within a 24 h period.  Remove after 12 hours.    Post laminectomy syndrome       methylPREDNISolone 4 MG tablet    MEDROL DOSEPAK    21 tablet    Follow package instructions    Intractable headache, unspecified chronicity pattern, unspecified headache type       MULTIVITAMIN PO      Take by mouth daily        omeprazole 20 MG CR capsule    priLOSEC     20 mg 2 times daily        * ondansetron 4 MG ODT tab    ZOFRAN-ODT     4 mg every 12 hours as needed        * ondansetron 4 MG ODT tab    ZOFRAN-ODT    60 tablet    Take 1-2 tablets (4-8 mg) by mouth every 12 hours as needed for nausea    Migraine without status migrainosus, not intractable, unspecified migraine type       * ondansetron 4 MG ODT tab    ZOFRAN ODT    20 tablet    Take 1-2 tablets (4-8 mg) by mouth every 8 hours as needed for nausea    Intractable headache, unspecified chronicity pattern, unspecified headache type       polyethylene glycol powder    MIRALAX/GLYCOLAX          PROBIOTIC DAILY PO      Take by mouth 2 times daily        rizatriptan 5 MG ODT tab    MAXALT-MLT    20 tablet    Take 1-2 tablets (5-10 mg) by mouth at onset of headache for migraine (max 30 mg in 24 hours)    Migraine without status migrainosus, not intractable, unspecified migraine type       SUMAtriptan 6 MG/0.5ML pen injector kit    IMITREX STATDOSE    2 kit    Inject 0.5 mLs (6 mg) Subcutaneous at onset of headache for migraine (may repeat once after 2 hrs) May repeat in 1 hour. Max 12 mg/24 hours.    Chronic migraine without aura without status migrainosus, not intractable       tiZANidine 4 MG tablet    ZANAFLEX    90 tablet    Take 2 tablets (8 mg) by mouth 3 times daily as needed for muscle spasms    Post laminectomy syndrome       * Notice:  This list has 3  medication(s) that are the same as other medications prescribed for you. Read the directions carefully, and ask your doctor or other care provider to review them with you.

## 2017-08-07 NOTE — NURSING NOTE
Patient received AVS discharge information. Information reviewed with patient, all patient questions answered and patient verbalized understanding of information received. Patient escorted to lobby.

## 2017-08-07 NOTE — PATIENT INSTRUCTIONS
1. Continue butrans patch    2. Start amitriptyline 10 tablet at night. After one week you can increase to 20 mg at night.    Follow up:    3 months      To speak with a nurse, schedule/reschedule/cancel a clinic appointment, or request a medication refill call: [phone]     You can also reach us by Contactual: [email or web address]    For refills, please call on Monday, 1 week before your medication runs out. The doctors are not always in clinic, so this gives us time to get your prescriptions ready.  Please let us know the name of the medication you are requesting a refill of.

## 2017-08-07 NOTE — PROGRESS NOTES
[facility] : Progress Note    Date of visit: 8/7/2017    Interval history:  Ada Welch is a 46 year old female with past medical history significant for Ehler Danlos syndrome is known to me for chronic  low back pain, which is mostly located at lower lumbar area. The patient has very complex history of low back pain for nearly 24 years for which she underwent L4-S1 fusion (TLIF) 12/09/2013.  Postoperatively her  back pain even became much worse than preop.  Mainly LBP, but also radiates down both legs S1 dermatome distribution.   She then had  removal of posterior instrumentation (screws and guadalupe) with exploration of fusion.  Fusion noted to already be solid.  NO significant relief afforded by this surgery. The pain has been progressively worsened, limiting the patient s activities, affecting mood and sleep quality, and causing a decrease in the patient s quality of life.  She underwent multiple SUNSHINE's which were not helpful, and even made things worse.  Recently started aquatic based physical therapy. Previous EMG showed some L5 radiculopathy on left. Her pain significantly improved after we started her butrans patch. She continues to be more functional than before the butrans was prescribed. She is able to walk longer distances and do activities with less pain that before. She has numbness in her left 4th toe that has been constant since her surgery. She denies any significant side effects from the medications. She also denies any new bowel or bladder issues, new weakness or numbness.     Today she came for f/u visit. She admitted recently with intractable migraine headache. At the time she had  MRI/MRA of the brain, a venogram and an MRI of the cervical spine. These studies were  notable for mild to moderate degenerative changes from C4-C7. She had Moderate loss of disc height is some loss of increased T2 signal of the disc at C5-6. Small  "broad-based disc bulge. Moderate right neuroforaminal narrowing noted at this level.  She has upcoming appointment with headache specialist Dr. Delilah Varner.       Recommendations/plan at the last visit included:    1. Interventions: None at this time. Discussed about botox injection.SUNSHINE's and SCS and other options have been discussed with her but this would be a last resort as she feels her body \"rejects foreign objects\".    2. Medications: Continue butrans patch as prescribed, she is getting excellent benefit at this time and is on a stable dose.  Continue tizanidine 8mg to help with her muscle spasms.  Continue ibuprofen and ACETAMINOPHEN prn.  3. Physical Therapy:  Continue as scheduled - she also does aquatic therapy - she will continue her HEP  4. Psychology/Coping skills: continue therapy in [place]  5. Diagnostic Studies:  None today  6. Follow up: 3 months          Since the last visit, Ada CASTANON Rebekah reports:  - headache pain: She developed intractable migraine headache recently. 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 The patient states that the medications are helpful        [place] Prescription Monitoring Program:   Reviewed. No concerns    Review of Systems:  The 14 system ROS was reviewed and was negative except what is documented above and as follows.  Any bowel or bladder problems: none  Mood: okay    Physical Exam:  Vitals:    08/07/17 1540   BP: 117/76   Pulse: 91   SpO2: 97%   Weight: 88.5 kg (195 lb)   Height: 1.676 m (5' 6\")       General: Awake in no apparent distress. This patient is accompanied in the office by patient's  and kids.  Eyes: Sclerae are anicteric. PERRLA, EOMI   Neck: supple, no masses.   Lungs: unlabored.   Heart: regular rate and rhythm   Abdomen: soft non tender.  Extremities: Pulses are well palpable, no peripheral edema.   Musculoskeletal: All muscle groups are normal in " bulk and tone. The patient changes position without pain behavior. The patient walks with a normal gait. Posture is normal. Muscle strength was rated at 5/5 in all groups in the extremities. Examination of the joints reveals preserved range of motion.  Neurologic exam:Strength 5/5 for bilateral grasp, finger abduction, wrist extension, elbow flexion, elbow extension, shoulder abduction.  Strength 5/5 for bilateral dorsiflexion, plantarflexion, great toe extension, knee extension, hip flexion. Sensation intact throughout all dermatomes bilateral upper extremities and lower extremities  Psychiatric; Normal affect.   Skin: Warm and Dry.      Medications:  Current Outpatient Prescriptions   Medication Sig Dispense Refill     SUMAtriptan (IMITREX STATDOSE) 6 MG/0.5ML pen injector kit Inject 0.5 mLs (6 mg) Subcutaneous at onset of headache for migraine (may repeat once after 2 hrs) May repeat in 1 hour. Max 12 mg/24 hours. 2 kit 1     rizatriptan (MAXALT-MLT) 5 MG ODT tab Take 1-2 tablets (5-10 mg) by mouth at onset of headache for migraine (max 30 mg in 24 hours) 20 tablet 0     methylPREDNISolone (MEDROL DOSEPAK) 4 MG tablet Follow package instructions 21 tablet 0     ondansetron (ZOFRAN ODT) 4 MG ODT tab Take 1-2 tablets (4-8 mg) by mouth every 8 hours as needed for nausea 20 tablet 1     ondansetron (ZOFRAN-ODT) 4 MG ODT tab Take 1-2 tablets (4-8 mg) by mouth every 12 hours as needed for nausea 60 tablet 1     buprenorphine (BUTRANS) 10 MCG/HR WK patch Place 1 patch onto the skin every 7 days 4 patch 3     ketamine, KETALAR, 5%-gabapentin, NEURONTIN, 8%-lidocaine 2.5% 5%-8% GEL topical PLO cream Apply 1 g topically 3 times daily as needed 30 g 3     tiZANidine (ZANAFLEX) 4 MG tablet Take 2 tablets (8 mg) by mouth 3 times daily as needed for muscle spasms 90 tablet 3     levothyroxine (SYNTHROID) 25 mcg/mL SUSP 25 mcg every morning       omeprazole (PRILOSEC) 20 MG CR capsule 20 mg 2 times daily       ondansetron  "(ZOFRAN-ODT) 4 MG ODT tab 4 mg every 12 hours as needed       lidocaine (LIDODERM) 5 % Patch Apply up to 3 patches to painful area at once for up to 12 h within a 24 h period.  Remove after 12 hours. 30 patch 1     fluticasone-salmeterol (ADVAIR DISKUS) 250-50 MCG/DOSE diskus inhaler        cetirizine (ZYRTEC) 10 MG tablet        polyethylene glycol (MIRALAX/GLYCOLAX) powder        FLUTICASONE FUROATE NA Spray 50 mcg in nostril At Bedtime       Multiple Vitamins-Minerals (MULTIVITAMIN PO) Take by mouth daily       Probiotic Product (PROBIOTIC DAILY PO) Take by mouth 2 times daily           LABORATORY VALUES:   Recent Labs   Lab Test  07/24/17   1518   NA  141   POTASSIUM  4.0   CHLORIDE  110*   CO2  27   ANIONGAP  5   GLC  88   BUN  8   CR  0.85   JUAN  8.9       CBC RESULTS:   Recent Labs   Lab Test  07/24/17   1518   WBC  5.7   RBC  4.41   HGB  13.5   HCT  39.5   MCV  90   MCH  30.6   MCHC  34.2   RDW  12.6   PLT  238       Most Recent 3 INR's:  Recent Labs   Lab Test  07/25/17   1249   INR  1.01       Diagnostic tests:    No images are attached to the encounter.      ASSESSMENT:       - Chronic migraine without aura without status migrainosus, not intractable  - Postlaminectomy syndrome of lumbar region         PLAN:  - Interventions: None at this time.  SUNSHINE's and SCS and other options have been discussed with her but this would be a last resort as she feels her body \"rejects foreign objects\". Discussed about Botox injection for migraine headache if conservative therapies fail.   - Medications:  getting excellent benefit with butrans at this time and is on a stable dose.    -  Continue tizanidine 8mg to help with her muscle spasms.  Continue ibuprofen and ACETAMINOPHEN prn.  - Take Amitriptyline 10 mg  at bedtime; the dose may be increased to 20 mg at bedtime.  - Physical Therapy:  Continue as scheduled - she also does aquatic therapy - she will continue her HEP  - Psychology/Coping skills: continue therapy in " [place]  - Diagnostic Studies:  None today    Follow up: 3 months         Assessment will be ongoing with changes in treatment as indicated.  Benefits/risks/alternatives to treatment have been reviewed and the patient has been instructed to contact this office if they have any questions or concerns.  This plan of care has been discussed with the patient and the patient is in agreement.     Faiza Martinez MD, PHD

## 2017-08-07 NOTE — LETTER
8/7/2017       RE: Ada Welch  [address] [facility] [facility] [address]     Dear Colleague,    Thank you for referring your patient, Ada Welch, to the [facility] at [facility]. Please see a copy of my visit note below.    [facility] : Progress Note    Date of visit: 8/7/2017    Interval history:  Ada Welch is a 46 year old female with past medical history significant for Ehler Danlos syndrome is known to me for chronic  low back pain, which is mostly located at lower lumbar area. The patient has very complex history of low back pain for nearly 24 years for which she underwent L4-S1 fusion (TLIF) 12/09/2013.  Postoperatively her  back pain even became much worse than preop.  Mainly LBP, but also radiates down both legs S1 dermatome distribution.   She then had  removal of posterior instrumentation (screws and guadalupe) with exploration of fusion.  Fusion noted to already be solid.  NO significant relief afforded by this surgery. The pain has been progressively worsened, limiting the patient s activities, affecting mood and sleep quality, and causing a decrease in the patient s quality of life.  She underwent multiple SUNSHINE's which were not helpful, and even made things worse.  Recently started aquatic based physical therapy. Previous EMG showed some L5 radiculopathy on left. Her pain significantly improved after we started her butrans patch. She continues to be more functional than before the butrans was prescribed. She is able to walk longer distances and do activities with less pain that before. She has numbness in her left 4th toe that has been constant since her surgery. She denies any significant side effects from the medications. She also denies any new bowel or bladder issues, new weakness or numbness.     Today she came for f/u visit. She admitted  "recently with intractable migraine headache. At the time she had  MRI/MRA of the brain, a venogram and an MRI of the cervical spine. These studies were  notable for mild to moderate degenerative changes from C4-C7. She had Moderate loss of disc height is some loss of increased T2 signal of the disc at C5-6. Small broad-based disc bulge. Moderate right neuroforaminal narrowing noted at this level.  She has upcoming appointment with headache specialist Dr. Delilah Varner.     Recommendations/plan at the last visit included:    1. Interventions: None at this time. Discussed about botox injection.SUNSHINE's and SCS and other options have been discussed with her but this would be a last resort as she feels her body \"rejects foreign objects\".    2. Medications: Continue butrans patch as prescribed, she is getting excellent benefit at this time and is on a stable dose.  Continue tizanidine 8mg to help with her muscle spasms.  Continue ibuprofen and ACETAMINOPHEN prn.  3. Physical Therapy:  Continue as scheduled - she also does aquatic therapy - she will continue her HEP  4. Psychology/Coping skills: continue therapy in [place]  5. Diagnostic Studies:  None today  6. Follow up: 3 months          Since the last visit, Ada Welch reports:  - headache pain: She developed intractable migraine headache recently. 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 The patient states that the medications are helpful      [place] Prescription Monitoring Program:   Reviewed. No concerns    Review of Systems:  The 14 system ROS was reviewed and was negative except what is documented above and as follows.  Any bowel or bladder problems: none  Mood: okay    Physical Exam:  Vitals:    08/07/17 1540   BP: 117/76   Pulse: 91   SpO2: 97%   Weight: 88.5 kg (195 lb)   Height: 1.676 m (5' 6\")     General: Awake in no apparent distress. This patient is accompanied in " the office by patient's  and kids.  Eyes: Sclerae are anicteric. PERRLA, EOMI   Neck: supple, no masses.   Lungs: unlabored.   Heart: regular rate and rhythm   Abdomen: soft non tender.  Extremities: Pulses are well palpable, no peripheral edema.   Musculoskeletal: All muscle groups are normal in bulk and tone. The patient changes position without pain behavior. The patient walks with a normal gait. Posture is normal. Muscle strength was rated at 5/5 in all groups in the extremities. Examination of the joints reveals preserved range of motion.  Neurologic exam:Strength 5/5 for bilateral grasp, finger abduction, wrist extension, elbow flexion, elbow extension, shoulder abduction.  Strength 5/5 for bilateral dorsiflexion, plantarflexion, great toe extension, knee extension, hip flexion. Sensation intact throughout all dermatomes bilateral upper extremities and lower extremities  Psychiatric; Normal affect.   Skin: Warm and Dry.    Medications:  Current Outpatient Prescriptions   Medication Sig Dispense Refill     SUMAtriptan (IMITREX STATDOSE) 6 MG/0.5ML pen injector kit Inject 0.5 mLs (6 mg) Subcutaneous at onset of headache for migraine (may repeat once after 2 hrs) May repeat in 1 hour. Max 12 mg/24 hours. 2 kit 1     rizatriptan (MAXALT-MLT) 5 MG ODT tab Take 1-2 tablets (5-10 mg) by mouth at onset of headache for migraine (max 30 mg in 24 hours) 20 tablet 0     methylPREDNISolone (MEDROL DOSEPAK) 4 MG tablet Follow package instructions 21 tablet 0     ondansetron (ZOFRAN ODT) 4 MG ODT tab Take 1-2 tablets (4-8 mg) by mouth every 8 hours as needed for nausea 20 tablet 1     ondansetron (ZOFRAN-ODT) 4 MG ODT tab Take 1-2 tablets (4-8 mg) by mouth every 12 hours as needed for nausea 60 tablet 1     buprenorphine (BUTRANS) 10 MCG/HR WK patch Place 1 patch onto the skin every 7 days 4 patch 3     ketamine, KETALAR, 5%-gabapentin, NEURONTIN, 8%-lidocaine 2.5% 5%-8% GEL topical PLO cream Apply 1 g topically 3  "times daily as needed 30 g 3     tiZANidine (ZANAFLEX) 4 MG tablet Take 2 tablets (8 mg) by mouth 3 times daily as needed for muscle spasms 90 tablet 3     levothyroxine (SYNTHROID) 25 mcg/mL SUSP 25 mcg every morning       omeprazole (PRILOSEC) 20 MG CR capsule 20 mg 2 times daily       ondansetron (ZOFRAN-ODT) 4 MG ODT tab 4 mg every 12 hours as needed       lidocaine (LIDODERM) 5 % Patch Apply up to 3 patches to painful area at once for up to 12 h within a 24 h period.  Remove after 12 hours. 30 patch 1     fluticasone-salmeterol (ADVAIR DISKUS) 250-50 MCG/DOSE diskus inhaler        cetirizine (ZYRTEC) 10 MG tablet        polyethylene glycol (MIRALAX/GLYCOLAX) powder        FLUTICASONE FUROATE NA Spray 50 mcg in nostril At Bedtime       Multiple Vitamins-Minerals (MULTIVITAMIN PO) Take by mouth daily       Probiotic Product (PROBIOTIC DAILY PO) Take by mouth 2 times daily       LABORATORY VALUES:   Recent Labs   Lab Test  07/24/17   1518   NA  141   POTASSIUM  4.0   CHLORIDE  110*   CO2  27   ANIONGAP  5   GLC  88   BUN  8   CR  0.85   JUAN  8.9   CBC RESULTS:   Recent Labs   Lab Test  07/24/17   1518   WBC  5.7   RBC  4.41   HGB  13.5   HCT  39.5   MCV  90   MCH  30.6   MCHC  34.2   RDW  12.6   PLT  238     Most Recent 3 INR's:  Recent Labs   Lab Test  07/25/17   1249   INR  1.01     Diagnostic tests:  No images are attached to the encounter.    ASSESSMENT:  - Chronic migraine without aura without status migrainosus, not intractable  - Postlaminectomy syndrome of lumbar region    PLAN:  - Interventions: None at this time.  SUNSHINE's and SCS and other options have been discussed with her but this would be a last resort as she feels her body \"rejects foreign objects\". Discussed about Botox injection for migraine headache if conservative therapies fail.   - Medications:  getting excellent benefit with butrans at this time and is on a stable dose.    -  Continue tizanidine 8mg to help with her muscle spasms.  Continue " ibuprofen and ACETAMINOPHEN prn.  - Take Amitriptyline 10 mg  at bedtime; the dose may be increased to 20 mg at bedtime.  - Physical Therapy:  Continue as scheduled - she also does aquatic therapy - she will continue her HEP  - Psychology/Coping skills: continue therapy in [place]  - Diagnostic Studies:  None today    Follow up: 3 months    Assessment will be ongoing with changes in treatment as indicated.  Benefits/risks/alternatives to treatment have been reviewed and the patient has been instructed to contact this office if they have any questions or concerns.  This plan of care has been discussed with the patient and the patient is in agreement.     Faiza Martinez MD, PHD

## 2017-08-09 ENCOUNTER — MEDICAL CORRESPONDENCE (OUTPATIENT)
Dept: HEALTH INFORMATION MANAGEMENT | Facility: CLINIC | Age: 47
End: 2017-08-09

## 2017-08-10 ENCOUNTER — MYC MEDICAL ADVICE (OUTPATIENT)
Dept: ANESTHESIOLOGY | Facility: CLINIC | Age: 47
End: 2017-08-10

## 2017-08-10 DIAGNOSIS — M54.2 NECK PAIN: Primary | ICD-10-CM

## 2017-08-14 ENCOUNTER — OFFICE VISIT (OUTPATIENT)
Dept: NEUROLOGY | Facility: CLINIC | Age: 47
End: 2017-08-14

## 2017-08-14 VITALS — HEART RATE: 102 BPM | DIASTOLIC BLOOD PRESSURE: 90 MMHG | SYSTOLIC BLOOD PRESSURE: 129 MMHG | HEIGHT: 66 IN

## 2017-08-14 DIAGNOSIS — G43.909 MIGRAINE WITHOUT STATUS MIGRAINOSUS, NOT INTRACTABLE, UNSPECIFIED MIGRAINE TYPE: ICD-10-CM

## 2017-08-14 DIAGNOSIS — M26.629 TMJ TENDERNESS: ICD-10-CM

## 2017-08-14 DIAGNOSIS — G44.89 CHRONIC MIXED HEADACHE SYNDROME: Primary | ICD-10-CM

## 2017-08-14 DIAGNOSIS — Z09 HOSPITAL DISCHARGE FOLLOW-UP: ICD-10-CM

## 2017-08-14 DIAGNOSIS — R51.9 OCCIPITAL PAIN: ICD-10-CM

## 2017-08-14 DIAGNOSIS — G43.711 INTRACTABLE CHRONIC MIGRAINE WITHOUT AURA AND WITH STATUS MIGRAINOSUS: ICD-10-CM

## 2017-08-14 ASSESSMENT — PAIN SCALES - GENERAL: PAINLEVEL: MODERATE PAIN (5)

## 2017-08-14 NOTE — PATIENT INSTRUCTIONS
Plan:  Continue amitriptyline, may try to increase the dose to 20 mg at bedtime  Occipital nerve block. Will ask Dr Martinez from [facility] referral  Myofascial release may be helpful  Acupuncture can be considered  Vitamin B2 (riboflavin) 400 mg daily OTC  Check with cardiologist about trying magnesium for headache  Drink water at least 8 glasses per day  Continue to follow up with Dr Martinez  May try eletriptan vs sumatriptan. Limit use to no more than than 2 days per week due to risk of rebound headache and serotonin syndrome. May try reglan for nausea as needed.     Syncope and reported black outs. Patient is scheduled to see a cardiologist.       Keep appointment with ENT and discuss your metallic taste and history of sinus surgeries.           Eletriptan tablets  What is this medicine?  ELETRIPTAN (el ih TRIP tan) is used to treat migraines with or without aura. An aura is a strange feeling or visual disturbance that warns you of an attack. It is not used to prevent migraines.  How should I use this medicine?  Take this medicine by mouth with a glass of water. Follow the directions on the prescription label. This medicine is taken at the first symptoms of a migraine. It is not for everyday use. If your migraine headache returns after one dose, you can take another dose as directed. You must leave at least 2 hours between doses, and do not take more than 40 mg as a single dose. Do not take more than 80 mg total in any 24 hour period. If there is no improvement at all after the first dose, do not take a second dose without talking to your doctor or health care professional. Do not take your medicine more often than directed.  Talk to your pediatrician regarding the use of this medicine in children. Special care may be needed.  What side effects may I notice from receiving this medicine?  Side effects that you should report to your doctor or health care professional as soon as possible:    allergic  reactions like skin rash, itching or hives, swelling of the face, lips, or tongue    fast, slow, or irregular heart beat    increased or decreased blood pressure    seizures    severe stomach pain and cramping, bloody diarrhea    signs and symptoms of a blood clot such as breathing problems; changes in vision; chest pain; severe, sudden headache; pain, swelling, warmth in the leg; trouble speaking; sudden numbness or weakness of the face, arm or leg    tingling, pain, or numbness in the face, hands, or feet  Side effects that usually do not require medical attention (report to your doctor or health care professional if they continue or are bothersome):    drowsiness    feeling warm, flushing, or redness of the face    headache    muscle cramps, pain    nausea, vomiting    unusually weak or tired  What may interact with this medicine?  Do not take this medicine with any of the following medications:    amiodarone    amphetamine, dextroamphetamine, or cocaine    aprepitant    certain antibiotics like clarithromycin, erythromycin, troleandomycin    cimetidine    conivaptan    dalfopristin; quinupristin    dihydroergotamine, ergotamine, ergoloid mesylates, methysergide, or ergot-type medication - do not take within 24 hours of taking eletriptan.    diltiazem    feverfew    imatinib    medicines for fungal infections like fluconazole, itraconazole, ketoconazole, and voriconazole    medicines for HIV, AIDS    medicines for mental depression like fluvoxamine and nefazodone    mifepristone    other migraine medicines like almotriptan, sumatriptan, naratriptan, rizatriptan, zolmitriptan - do not take within 24 hours of taking eletriptan.    tryptophan    verapamil  This medicine may also interact with the following medications:    medicines for mental depression, anxiety or mood problems  What if I miss a dose?  This does not apply; this medicine is not for regular use.  Where should I keep my medicine?  Keep out of the  reach of children.  Store at room temperature between 15 and 30 degrees C (59 and 86 degrees F). Throw away any unused medicine after the expiration date.  What should I tell my health care provider before I take this medicine?  They need to know if you have any of these conditions:    bowel disease or colitis    diabetes    family history of heart disease    fast or irregular heart beat    heart or blood vessel disease, angina (chest pain), or previous heart attack    high blood pressure    high cholesterol    history of stroke, transient ischemic attacks (TIAs or mini-strokes), or intracranial bleeding    kidney or liver disease    overweight    poor circulation    postmenopausal or surgical removal of uterus and ovaries    Raynaud's disease    seizure disorder    an unusual or allergic reaction to eletriptan, other medicines, foods, dyes, or preservatives    pregnant or trying to get pregnant    breast-feeding  What should I watch for while using this medicine?  Only take this medicine for a migraine headache. Take it if you get warning symptoms or at the start of a migraine attack. It is not for regular use to prevent migraine attacks.  You may get drowsy or dizzy. Do not drive, use machinery, or do anything that needs mental alertness until you know how this medicine affects you. To reduce dizzy or fainting spells, do not sit or stand up quickly, especially if you are an older patient. Alcohol can increase drowsiness, dizziness and flushing. Avoid alcoholic drinks.  Smoking cigarettes may increase the risk of heart-related side effects from using this medicine.  If you take migraine medicines for 10 or more days a month, your migraines may get worse. Keep a diary of headache days and medicine use. Contact your healthcare professional if your migraine attacks occur more frequently.  NOTE:This sheet is a summary. It may not cover all possible information. If you have questions about this medicine, talk to your  doctor, pharmacist, or health care provider. Copyright  2017 [employer]        Metoclopramide tablets  What is this medicine?  METOCLOPRAMIDE (met oh kloe PRA mide) is used to treat the symptoms of gastroesophageal reflux disease (GERD) like heartburn. It is also used to treat people with slow emptying of the stomach and intestinal tract.  How should I use this medicine?  Take this medicine by mouth with a glass of water. Follow the directions on the prescription label. Take this medicine on an empty stomach, about 30 minutes before eating. Take your doses at regular intervals. Do not take your medicine more often than directed. Do not stop taking except on the advice of your doctor or health care professional.  A special MedGuide will be given to you by the pharmacist with each prescription and refill. Be sure to read this information carefully each time.  Talk to your pediatrician regarding the use of this medicine in children. Special care may be needed.  What side effects may I notice from receiving this medicine?  Side effects that you should report to your doctor or health care professional as soon as possible:    allergic reactions like skin rash, itching or hives, swelling of the face, lips, or tongue    abnormal production of milk in females    breast enlargement in both males and females    change in the way you walk    difficulty moving, speaking or swallowing    drooling, lip smacking, or rapid movements of the tongue    excessive sweating    fever    involuntary or uncontrollable movements of the eyes, head, arms and legs    irregular heartbeat or palpitations    muscle twitches and spasms    unusually weak or tired  Side effects that usually do not require medical attention (report to your doctor or health care professional if they continue or are bothersome):    change in sex drive or performance    depressed mood    diarrhea    difficulty sleeping    headache    menstrual changes    restless or  nervous  What may interact with this medicine?    acetaminophen    cyclosporine    digoxin    medicines for blood pressure    medicines for diabetes, including insulin    medicines for hay fever and other allergies    medicines for depression, especially an Monoamine Oxidase Inhibitor (MAOI)    medicines for Parkinson's disease, like levodopa    medicines for sleep or for pain    tetracycline  What if I miss a dose?  If you miss a dose, take it as soon as you can. If it is almost time for your next dose, take only that dose. Do not take double or extra doses.  Where should I keep my medicine?  Keep out of the reach of children.  Store at room temperature between 20 and 25 degrees C (68 and 77 degrees F). Protect from light. Keep container tightly closed. Throw away any unused medicine after the expiration date.  What should I tell my health care provider before I take this medicine?  They need to know if you have any of these conditions:    breast cancer    depression    diabetes    heart failure    high blood pressure    kidney disease    liver disease    Parkinson's disease or a movement disorder    pheochromocytoma    seizures    stomach obstruction, bleeding, or perforation    an unusual or allergic reaction to metoclopramide, procainamide, sulfites, other medicines, foods, dyes, or preservatives    pregnant or trying to get pregnant    breast-feeding  What should I watch for while using this medicine?  It may take a few weeks for your stomach condition to start to get better. However, do not take this medicine for longer than 12 weeks. The longer you take this medicine, and the more you take it, the greater your chances are of developing serious side effects. If you are an elderly patient, a female patient, or you have diabetes, you may be at an increased risk for side effects from this medicine. Contact your doctor immediately if you start having movements you cannot control such as lip smacking, rapid  movements of the tongue, involuntary or uncontrollable movements of the eyes, head, arms and legs, or muscle twitches and spasms.  Patients and their families should watch out for worsening depression or thoughts of suicide. Also watch out for any sudden or severe changes in feelings such as feeling anxious, agitated, panicky, irritable, hostile, aggressive, impulsive, severely restless, overly excited and hyperactive, or not being able to sleep. If this happens, especially at the beginning of treatment or after a change in dose, call your doctor.  Do not treat yourself for high fever. Ask your doctor or health care professional for advice.  You may get drowsy or dizzy. Do not drive, use machinery, or do anything that needs mental alertness until you know how this drug affects you. Do not stand or sit up quickly, especially if you are an older patient. This reduces the risk of dizzy or fainting spells. Alcohol can make you more drowsy and dizzy. Avoid alcoholic drinks.  NOTE:This sheet is a summary. It may not cover all possible information. If you have questions about this medicine, talk to your doctor, pharmacist, or health care provider. Copyright  2017 [employer]      Serotonin syndrome symptoms usually occur within several hours of taking a new drug or increasing the dose of a drug you're already taking.   Signs and symptoms include:   Agitation or restlessness  Rapid heart rate and high blood pressure  Dilated pupils  Loss of muscle coordination or twitching muscles  Muscle rigidity  Heavy sweating  Diarrhea  Headache  Shivering, Goose bumps  Severe serotonin syndrome can be life-threatening. Signs and symptoms include:High fever, Seizures, Irregular heartbeat, Unconsciousness

## 2017-08-14 NOTE — PROGRESS NOTES
"Re: Ada Welch      MRN# [id number]  YOB: 1970  Date of Visit: 8/14/2017    OUTPATIENT NEUROLOGY VISIT NOTE    Chief Complaint:  Hospital discharge follow up and headache evaluation    History of Present Illness  Ada Welch is a 46-year-old female presents to the clinic today for headache evaluation and post hospital discharge for acute headache treatment.   History of s/p  removal of posterior instrumentation (screws and guadalupe) with exploration of fusion, chronic low back pain,  Ehler Danlos syndrome, chronic headache. Has seen Dr Martinez [facility] the [facility]. Patient is currently has a Butran patch. Patient is flying to [place] in August to a neurosurgereon who specializes in Ehler Danlos syndrome    Headache History:      Onset History: at the age of 30 but headaches before. Both sisters suffer of migraine headaches. Patient saw Dr Staton, a neurologist at [place]     Current Headache Pattern:      Frequency (How many headache days per month?):  Daily for 2 month steady and before that 6-8 per month since December until July 4th. Headaches became more infrequent from 6 years until last year. After last pregnancy 6 years headaches were better until last year.   Headache worsen with activity and Laying down helps and Facial numbness since May of 2017. Headache became \"different\" in May with feeling \"buzzing\" feeling in her head.      Duration of Headache: max 24 hours in the past . Reports that headaches vary in intensity until they are gone. Gets more headaches in the afternoon.      Aura: \"colors change\" and metallic taste in her moth in the first hour before the headache   Associated Symptoms:  nausea and vision changes \"wavy vision\", \"fuzzy vision\", metallic taste in her mouth, light or noise sensitivity, \"light flickers\" or \"bright shows\" will amplify her headaches, vertigo       Description of Headache Pain & Location: burning, throbbing at times, aching that  bilateral in the temporal area, " "bilateral in the occipital area, thru the jaw and all over her shoulders. Headaches are worse with \"cracking and popping \" in her neck      Level of Pain as headache is starting:  3-5/10      Level of Pain during usual headache:  7/10      Level of Pain during the worst headache:  10/10      Do headaches interfere with or prevent usual activities or diminish your productivity at home or work?  Yes    Treatments Tried:  Amitriptyline 10 mg at bedtime started just one week ago by Dr Martinez  Tylenol and Benadryl daily for 2 month  Physical therapy which made her headaches worse  Sumatriptan injectable helps but causing \"muscle tightness\"\"sickness\" and \"dizziness\"  Rizatriptan -would help for a short time  Tizanidine prescribed by Dr Martinez  Medrol Dose pack-may be some relief  DHE injection which helped with cluster headaches in the past. Tried at her last hospitalization but would reduce her \"BP\"  metoclopramide helped with nausea  Ketorolac -helped but for a short time  Promethazine  Gabapentin -\"does nothing\" for me  Lyrica -\"no benefit\" from that     Have you needed to utilize the Emergency Room to treat your headache symptoms?  If so, how often and when was the last time used:  7/24/2017  For headache and confusion and numbness in her face, was evaluated by Neurology in the ED.     What makes your headaches worse or triggers your headaches? Has a hard time sleeping      Black out onset in July of 2017. Has seen a cardiologist in [facility]. LOC and lasting 30 seconds.  was \"there\" and did not witnessed anything. Happened twice in July. Reports that she \"black out\" in the past due to \"certain allergic reaction\" to medications occurred at the age of 15. Reports that her father passed away from brain cancer when patient at the age of 15.   Has started having some body temperature fluctuation.   Denies history of head or neck trauma, seizure.   Neurodiagnostic Testing    MRI  MRI brain without and with " contrast  MRA of the head without contrast  Neck MRA without and with contrast     Provided History:  look for carotid dissection- hx of Tom Danlos-  hx of headache with visual problems and left facial numbness.     Comparison:  CT abdomen 16 2013       Technique:   Brain MRI:  Axial diffusion, FLAIR, T2-weighted, susceptibility, and  coronal T1-weighted images were obtained without intravenous contrast.  Following intravenous gadolinium-based contrast administration, axial  and coronal T1-weighted images were obtained.    Head MRA: 3D time-of-flight MRA of the [place] was performed  without intravenous contrast.  Neck MRA:  Limited non contrast 2DTOF images were obtained of the  mid-cervical region. Following intravenous gadolinium-based contrast  administration, a contrast enhanced MRA of the neck/cervical vessels  was performed.  Three-dimensional reconstructions of the neck and head MRA were  created, which were reviewed by the radiologist.  Head MRV: 2D time-of-flight MR venogram (MRV) of the head was  performed without intravenous contrast. Following intravenous  gadolinium-based contrast administration, a contrast enhanced MRV of  the intracranial vessels was performed.     Dose: 7.5 mL Gadavist     Findings:   Brain MRI: Axial diffusion weighted images demonstrate no definite  acute infarct. On the FLAIR images, there is no significant  abnormality. There is no definite intracranial hemorrhage on  susceptibility images. Ventricles are proportionate to the cerebral  sulci. Contrast-enhanced images of the brain demonstrate no abnormal  intra- or extra-axial enhancement. Ethmoid sinus mucosal thickening.  Maxillary sinus mucosal thickening. Fluid level in the left greater  than right maxillary sinus.     Head MRA demonstrates no definite aneurysm or stenosis of the major  intracranial arteries. The anterior communicating artery is patent.  Regarding the posterior communicating arteries, both are  patent.     Neck MRA demonstrates patent major cervical arteries. The normal  distal right internal carotid artery measures 6 mm. The normal distal  left internal carotid artery measures 6 mm. Antegrade flow in the  major cervical vasculature. On the T2 and T1 fat saturated neck  imaging there is no evidence for dissection. Bilateral prominent lymph  nodes, likely reactive     Head MRV demonstrates no definite thrombosis or stenosis of the major  intracranial dural sinuses or deep cerebral veins. Postcontrast MRV  images do not demonstrate any definite intraluminal filling defect.         Impression:  1. No evidence of acute infarction or intracranial hemorrhage.  2. No abnormal enhancing lesions intracranially.  3. Head MRA demonstrates no definite aneurysm or stenosis of the major  intracranial arteries.  4. Neck MRA demonstrates patent major cervical arteries. No evidence  for dissection.  5. Normal MRV of the brain.  6. Air-fluid level in the left greater than right maxillary sinus, may  represent sinusitis in the correct clinical context.     I have personally reviewed the examination and initial interpretation  and I agree with the findings.     KOREY OVERTON MD           MR CERVICAL SPINE W/O & W CONTRAST 7/24/2017 6:25 PM     Provided History: neck pain numbness     Comparison: MRI 2/17/2016     Technique: Sagittal T1-weighted, sagittal T2-weighted, sagittal  diffusion weighted, axial T2-weighted, and axial T2* gradient echo  images of the cervical spine were obtained without intravenous  contrast. Following intravenous administration of gadolinium, axial  and sagittal T1-weighted images with fat saturation were also  obtained.     Contrast: 7.5 mL Gadavist     Findings:  Mild loss of cervical lordosis with trace anterolisthesis of C4 over  C5.  There is mild to moderate disc height narrowing most prominent at  C5-C7.  There is normal signal within and normal contour of the  cervical spinal cord. Unchanged  focus of T2 hyperintensity at the  level of C4-C5, most consistent with a mildly focally dilated central  canal. There is no abnormal contrast enhancement within the cervical  spinal cord, thecal sac or vertebral column.  The findings on a level  by level basis are as follows:     C2-3: Preserved disc height and normal increased T2 signal of the  disc. No spinal canal or neural foraminal narrowing.     C3-4: Preserved disc height is normal increased T2 signal of the disc.  No spinal canal or neural foraminal narrowing.     C4-5:  Mild loss of disc height. Preserved increased T2 signal of the  disc. Tiny broad-based disc bulge. Mild bilateral neuroforaminal  narrowing. No spinal canal narrowing.     C5-6:  Moderate loss of disc height is some loss of increased T2  signal of the disc. Small broad-based disc bulge. Moderate right  neuroforaminal narrowing. Spinal canal is preserved.     C6-7:  Moderate loss of disc height loss of increased T2 signal in  this. Small broad-based disc bulge. Preserved spinal canal. Mild right  neuroforaminal narrowing.     C7-T1:  No spinal canal or neural foraminal narrowing. Preserved disc  height no increased T2 signal of the disc.      No abnormality of the paraspinous soft tissues.         Impression:   1. No abnormal enhancement in the spinal cord, thecal sac or cervical  vertebrae.  2. Unchanged mild to moderate degenerative changes, most prominent  C4-C7. Moderate right neuroforaminal narrowing at C5-C6.      I have personally reviewed the examination and initial interpretation  and I agree with the findings.     KOREY OVERTON MD  Lab  LP on 7/26/2017  Lumbar Puncture using Fluoroscopy     History:  check for low pressure headache. Please get Opening pressure  with legs extended.. Attempt at bedside.     Procedure note: Verbal and written consent for lumbar puncture was  obtained from the patient, and benefits and risk of the procedure were  explained, including but not limited  to worsening headache,  hemorrhage, infection, lower extremity pain, or nerve root injury. The  patient was sterilely prepped and draped with the patient in the  prone/decubitus position, over the lower back. Under fluoroscopic  guidance, the interlaminar spaces were noted. 1% lidocaine was  administered for local anesthetic over the L2-3 interlaminar space,  and a 22 gauge 3.5 inch needle was advanced into the thecal sac under  fluoroscopic guidance.  There was initial show of clear CSF. Opening  pressure was 16.4 cm CSF. Approximately 10 cc of CSF were collected.     The needle was removed with the stylet in place. There was no  immediate complication associated with the procedure. Samples were  sent for the requested laboratory testing.       Estimated blood loss: Less than 1 cc.     Fluoroscopic time: 15.1 seconds         Impression: Successful lumbar puncture without immediate complication.  Normal opening pressure.     I, CARMEN PARK MD, attest that I was present for all critical  portions of the procedure and was immediately available to provide  guidance and assistance during the remainder of the procedure.     I have personally reviewed the examination and initial interpretation  and I agree with the findings.     CARMEN PARK MD    Results for JULIANO PANCHO KINA (MRN [id number]) as of 8/14/2017 09:19   Ref. Range 7/26/2017 10:30   RBC CSF Latest Ref Range: 0 - 2 /uL 0   WBC CSF Latest Ref Range: 0 - 5 /uL 0   Glucose CSF Latest Ref Range: 40 - 70 mg/dL 63   Protein Total CSF Latest Ref Range: 15 - 60 mg/dL 36   Results for PANCHO HENAO (MRN [id number]) as of 8/14/2017 09:19   Ref. 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     Chronic sinusitis 00/2007    Diagnosed with chronic pansinusitis 2016     Chronic tonsillitis 1980    Had tonsils removed 02/1981, rheumatic fever     Gastroesophageal reflux disease 3/1/2017    I have large hiatal hernia     Hernia, abdominal 10/2016    Hiatal Hernia     Hiatal hernia 2016    Have adeline hiatel hernia     Hoarseness Unsure    It comes and goes     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     Migraines 1/1/2007    Unsure of exact date     Nasal polyps 2013    Were revoved 03/2017, benign     Pneumonia Unsure    Have had pneumonia several times     Swelling, mass, or lump in head and neck 08/2016    Lymph node has been growing for last year     Thyroid disease 01/01/2008       Past Surgical History reviewed and verified with the patient  Past Surgical History:   Procedure Laterality Date     AS REPAIR OF NASAL SEPTUM       TONSILLECTOMY       TUBAL LIGATION       Family History reviewed and verified with the patient  Father-mets to his brain,  headache  Migraines-both sister  Mother-EDS type 3  Niece-\"seizures\" as a child    Social History:  Has 5 children-ages from 24, 23, 19, 13, 6.  of 25 years. Used to work as a . Moved from [place], [place] 2 months ago.     Social History   Substance Use Topics     Smoking status: Former Smoker     Packs/day: 15.00     Years: 0.25     Types: Other     Quit date: 7/17/2014     Smokeless tobacco: Never Used     Alcohol use No    reviewed and verified with the patient     Allergies   Allergen Reactions     Bee Venom Anaphylaxis, Difficulty breathing, Palpitations, Shortness Of Breath and Visual Disturbance     Patient does carry Epi-Pen     Chicken-Derived Products (Egg) Diarrhea     Other reaction(s): Nausea  Patient will self monitor  Other reaction(s): GI intolerance     Gabapentin      " Gluten Meal      Lactose Dermatitis and Diarrhea     Milk Protein Extract      Other reaction(s): GI intolerance\  EGGS      Topiramate      Wheat Diarrhea     Wheat Bran      Other reaction(s): Nausea  Patient will self monitor       Current Outpatient Prescriptions   Medication Sig Dispense Refill     buprenorphine (BUTRANS) 10 MCG/HR WK patch Place 1 patch onto the skin every 7 days 4 patch 0     amitriptyline (ELAVIL) 10 MG tablet Take 1 tablet (10 mg) by mouth At Bedtime 60 tablet 1     SUMAtriptan (IMITREX STATDOSE) 6 MG/0.5ML pen injector kit Inject 0.5 mLs (6 mg) Subcutaneous at onset of headache for migraine (may repeat once after 2 hrs) May repeat in 1 hour. Max 12 mg/24 hours. 2 kit 1     rizatriptan (MAXALT-MLT) 5 MG ODT tab Take 1-2 tablets (5-10 mg) by mouth at onset of headache for migraine (max 30 mg in 24 hours) 20 tablet 0     methylPREDNISolone (MEDROL DOSEPAK) 4 MG tablet Follow package instructions 21 tablet 0     ondansetron (ZOFRAN ODT) 4 MG ODT tab Take 1-2 tablets (4-8 mg) by mouth every 8 hours as needed for nausea 20 tablet 1     ondansetron (ZOFRAN-ODT) 4 MG ODT tab Take 1-2 tablets (4-8 mg) by mouth every 12 hours as needed for nausea 60 tablet 1     ketamine, KETALAR, 5%-gabapentin, NEURONTIN, 8%-lidocaine 2.5% 5%-8% GEL topical PLO cream Apply 1 g topically 3 times daily as needed 30 g 3     tiZANidine (ZANAFLEX) 4 MG tablet Take 2 tablets (8 mg) by mouth 3 times daily as needed for muscle spasms 90 tablet 3     levothyroxine (SYNTHROID) 25 mcg/mL SUSP 25 mcg every morning       omeprazole (PRILOSEC) 20 MG CR capsule 20 mg 2 times daily       ondansetron (ZOFRAN-ODT) 4 MG ODT tab 4 mg every 12 hours as needed       lidocaine (LIDODERM) 5 % Patch Apply up to 3 patches to painful area at once for up to 12 h within a 24 h period.  Remove after 12 hours. 30 patch 1     fluticasone-salmeterol (ADVAIR DISKUS) 250-50 MCG/DOSE diskus inhaler        cetirizine (ZYRTEC) 10 MG tablet         "polyethylene glycol (MIRALAX/GLYCOLAX) powder        FLUTICASONE FUROATE NA Spray 50 mcg in nostril At Bedtime       Multiple Vitamins-Minerals (MULTIVITAMIN PO) Take by mouth daily       Probiotic Product (PROBIOTIC DAILY PO) Take by mouth 2 times daily     reviewed and verified with the patient    Review of Systems:  A 12-point ROS including constitutional, eyes, ENT, respiratory, cardiovascular, gastroenterology, genitourinary, integumentary, musculoskeletal, neurology, hematology and psychiatric were all reviewed with the patient and \"immune system does not work properly\" due to EDS, urological problems, has a loop recorderas mentioned in the HPI.     General Exam:   /90  Pulse 102  Ht 1.676 m (5' 6\")  LMP 07/10/2017  GEN: Awake, NAD; good eye contact, responses appropriately, headache is 7/10  HEENT: Head atraumatic/Normocephalic. Scalp normal. Occipital tenderness left >right, TMJ tenderness left >right.  Pupils equally round, 4 mm, reactive to light and accommodation, sclera and conjunctiva normal. Fundoscopic examination attempted not able to visualize right or left.   Neck: Easily moveable without resistance  Heart: S1/S2 appreciated, RRR, no m/r/g, no carotid bruits  Lungs:Lungs are clear to auscultation bilaterally, no wheezes or crackles.   Neurological Examination:  The patient is alert and oriented times four. Has good attention and concentration. Speech is fluent without dysarthria.   Cranial nerves:  CN I deferred.   CN II: Intact and full visual fields to confrontation bilaterally. CN III, IV, VI: EOM intact. There is no nystagmus. Has conjugated gaze. Intact direct and consensual pupillary light reflexes.   CN V: Intact and symmetrical to facial sensation in the V1 through V3 bilaterally.   CN VII: Intact and symmetrical eyebrow and lid raise and eyelid closure, smiles and frown.   CN VIII: Intact to finger rub bilaterally.   CN IX and X: The palates elevates symmetrical. The uvula is " "midline.   CN XII: The tongue protrudes midline with no atrophy or fasciculations.   Motor exam: The patient has a normal bulk and tone throughout. There is no atrophy, fasciculations, clonus, or abnormal movements appreciated.   Strength Exam:  5/5 strength at shoulder abduction, elbow flexion or extension, wrist flexion or extension, finger abduction, , hip flexion and extension, knee flexion and extension, and dorsiflexion and plantarflexion bilaterally.   Sensation is intact to light touch  throughout. Reflexes are symmetrical at biceps, triceps, brachioradialis, patellar, and Achilles.   Negative Babinski with downgoing toes bilaterally.   Coordination reveals finger-nose-finger, rapid alternating movements, finger tapping, and toe tapping with normal speed and accuracy.   Station and gait is normal. There is no ataxia. Can walk on the toes, heels, and tandem walk without difficulty. Has good postural reflexes.Has no drift and a negative Romberg. Marline's negative        Assessment and Plan:  Hospital follow up. Headaches worsening for 2 months. Headache pattern changes in May of 2016. Appears to be mixed etiology or multifactorial. History of sinus surgery in December of 2016. Seen in the ED for acute headache and had negative work up, [facility] admission with some partial relief, LP negative with normal findings on 7/26/2017.  Brain MRI on 7/24/2017 no intracranial causes of her headache worsening but some changes in her sinuses.    Occipital tenderness left >right, TMJ tenderness left >right.  Brain MRI some pansinusitis and metallic taste in her mouth, history of sinus surgery in December of 2016 -patient is already scheduled to see an ENT.   Patient is very happy with seeing Dr Martinez at the [facility]. Patient started on Amitriptyline just one week ago. Patient is on Butran patch in November of 2016 for lower back pain which helped with \"tightness\" feeling in her lower legs and reduces pain flare. Butran " may be causing worsening of her headache but patient feels that she benefits from Butran greatly.   Plan:  Continue amitriptyline, may try to increase the dose to 20 mg at bedtime. May consider other preventive medications if not effective.   Occipital nerve block. Will ask Dr [facility]  [facility] referral  Myofascial release may be helpful  Acupuncture can be considered  Vitamin B2 (riboflavin) 400 mg daily OTC  Check with cardiologist about trying magnesium for headache  Drink water at least 8 glasses per day  Continue to follow up with Dr Martinez from [facility]. Botox can be considered as well  May try eletriptan vs sumatriptan. Limit use to no more than than 2 days per week due to risk of rebound headache and serotonin syndrome. But will wait until she sees hematologist for bruising. May try reglan for nausea as needed.     Syncope and reported black outs. Patient is scheduled to see a cardiologist.     I discussed all my recommendation with Ada Welch. The patient verbalizes understanding and comfortable with the plan. The patient has our clinic phone number to call with any questions or concerns. All of the patient's questions were answered from the best of my current knowledge.     Thank you for letting me be a part of the treatment team for Ada Welch      Time spent with pt answering questions, discussing findings, counseling and coordinating care was more than 50% the appointment time, 70  minutes.         FIDELINA Carlisle, CNP  [facility]

## 2017-08-14 NOTE — MR AVS SNAPSHOT
After Visit Summary   8/14/2017    Ada Welch    MRN: [id number]           Patient Information     Date Of Birth          1970        Visit Information        Provider Department      8/14/2017 8:30 AM Fara Varner APRN CNP [facility] Neurology        Care Instructions    Plan:  Continue amitriptyline, may try to increase the dose to 20 mg at bedtime  Occipital nerve block. Will ask Dr Martinez from [facility] referral  Myofascial release may be helpful  Acupuncture can be considered  Vitamin B2 (riboflavin) 400 mg daily OTC  Check with cardiologist about trying magnesium for headache  Drink water at least 8 glasses per day  Continue to follow up with Dr Martinez  May try eletriptan vs sumatriptan. Limit use to no more than than 2 days per week due to risk of rebound headache and serotonin syndrome. May try reglan for nausea as needed.     Syncope and reported black outs. Patient is scheduled to see a cardiologist.       Keep appointment with ENT and discuss your metallic taste and history of sinus surgeries.           Eletriptan tablets  What is this medicine?  ELETRIPTAN (el ih TRIP tan) is used to treat migraines with or without aura. An aura is a strange feeling or visual disturbance that warns you of an attack. It is not used to prevent migraines.  How should I use this medicine?  Take this medicine by mouth with a glass of water. Follow the directions on the prescription label. This medicine is taken at the first symptoms of a migraine. It is not for everyday use. If your migraine headache returns after one dose, you can take another dose as directed. You must leave at least 2 hours between doses, and do not take more than 40 mg as a single dose. Do not take more than 80 mg total in any 24 hour period. If there is no improvement at all after the first dose, do not take a second dose without talking to your doctor or health care professional. Do not take  your medicine more often than directed.  Talk to your pediatrician regarding the use of this medicine in children. Special care may be needed.  What side effects may I notice from receiving this medicine?  Side effects that you should report to your doctor or health care professional as soon as possible:    allergic reactions like skin rash, itching or hives, swelling of the face, lips, or tongue    fast, slow, or irregular heart beat    increased or decreased blood pressure    seizures    severe stomach pain and cramping, bloody diarrhea    signs and symptoms of a blood clot such as breathing problems; changes in vision; chest pain; severe, sudden headache; pain, swelling, warmth in the leg; trouble speaking; sudden numbness or weakness of the face, arm or leg    tingling, pain, or numbness in the face, hands, or feet  Side effects that usually do not require medical attention (report to your doctor or health care professional if they continue or are bothersome):    drowsiness    feeling warm, flushing, or redness of the face    headache    muscle cramps, pain    nausea, vomiting    unusually weak or tired  What may interact with this medicine?  Do not take this medicine with any of the following medications:    amiodarone    amphetamine, dextroamphetamine, or cocaine    aprepitant    certain antibiotics like clarithromycin, erythromycin, troleandomycin    cimetidine    conivaptan    dalfopristin; quinupristin    dihydroergotamine, ergotamine, ergoloid mesylates, methysergide, or ergot-type medication - do not take within 24 hours of taking eletriptan.    diltiazem    feverfew    imatinib    medicines for fungal infections like fluconazole, itraconazole, ketoconazole, and voriconazole    medicines for HIV, AIDS    medicines for mental depression like fluvoxamine and nefazodone    mifepristone    other migraine medicines like almotriptan, sumatriptan, naratriptan, rizatriptan, zolmitriptan - do not take within 24  hours of taking eletriptan.    tryptophan    verapamil  This medicine may also interact with the following medications:    medicines for mental depression, anxiety or mood problems  What if I miss a dose?  This does not apply; this medicine is not for regular use.  Where should I keep my medicine?  Keep out of the reach of children.  Store at room temperature between 15 and 30 degrees C (59 and 86 degrees F). Throw away any unused medicine after the expiration date.  What should I tell my health care provider before I take this medicine?  They need to know if you have any of these conditions:    bowel disease or colitis    diabetes    family history of heart disease    fast or irregular heart beat    heart or blood vessel disease, angina (chest pain), or previous heart attack    high blood pressure    high cholesterol    history of stroke, transient ischemic attacks (TIAs or mini-strokes), or intracranial bleeding    kidney or liver disease    overweight    poor circulation    postmenopausal or surgical removal of uterus and ovaries    Raynaud's disease    seizure disorder    an unusual or allergic reaction to eletriptan, other medicines, foods, dyes, or preservatives    pregnant or trying to get pregnant    breast-feeding  What should I watch for while using this medicine?  Only take this medicine for a migraine headache. Take it if you get warning symptoms or at the start of a migraine attack. It is not for regular use to prevent migraine attacks.  You may get drowsy or dizzy. Do not drive, use machinery, or do anything that needs mental alertness until you know how this medicine affects you. To reduce dizzy or fainting spells, do not sit or stand up quickly, especially if you are an older patient. Alcohol can increase drowsiness, dizziness and flushing. Avoid alcoholic drinks.  Smoking cigarettes may increase the risk of heart-related side effects from using this medicine.  If you take migraine medicines for 10  or more days a month, your migraines may get worse. Keep a diary of headache days and medicine use. Contact your healthcare professional if your migraine attacks occur more frequently.  NOTE:This sheet is a summary. It may not cover all possible information. If you have questions about this medicine, talk to your doctor, pharmacist, or health care provider. Copyright  2017 Gold Standard        Metoclopramide tablets  What is this medicine?  METOCLOPRAMIDE (met oh kloe PRA mide) is used to treat the symptoms of gastroesophageal reflux disease (GERD) like heartburn. It is also used to treat people with slow emptying of the stomach and intestinal tract.  How should I use this medicine?  Take this medicine by mouth with a glass of water. Follow the directions on the prescription label. Take this medicine on an empty stomach, about 30 minutes before eating. Take your doses at regular intervals. Do not take your medicine more often than directed. Do not stop taking except on the advice of your doctor or health care professional.  A special MedGuide will be given to you by the pharmacist with each prescription and refill. Be sure to read this information carefully each time.  Talk to your pediatrician regarding the use of this medicine in children. Special care may be needed.  What side effects may I notice from receiving this medicine?  Side effects that you should report to your doctor or health care professional as soon as possible:    allergic reactions like skin rash, itching or hives, swelling of the face, lips, or tongue    abnormal production of milk in females    breast enlargement in both males and females    change in the way you walk    difficulty moving, speaking or swallowing    drooling, lip smacking, or rapid movements of the tongue    excessive sweating    fever    involuntary or uncontrollable movements of the eyes, head, arms and legs    irregular heartbeat or palpitations    muscle twitches and  spasms    unusually weak or tired  Side effects that usually do not require medical attention (report to your doctor or health care professional if they continue or are bothersome):    change in sex drive or performance    depressed mood    diarrhea    difficulty sleeping    headache    menstrual changes    restless or nervous  What may interact with this medicine?    acetaminophen    cyclosporine    digoxin    medicines for blood pressure    medicines for diabetes, including insulin    medicines for hay fever and other allergies    medicines for depression, especially an Monoamine Oxidase Inhibitor (MAOI)    medicines for Parkinson's disease, like levodopa    medicines for sleep or for pain    tetracycline  What if I miss a dose?  If you miss a dose, take it as soon as you can. If it is almost time for your next dose, take only that dose. Do not take double or extra doses.  Where should I keep my medicine?  Keep out of the reach of children.  Store at room temperature between 20 and 25 degrees C (68 and 77 degrees F). Protect from light. Keep container tightly closed. Throw away any unused medicine after the expiration date.  What should I tell my health care provider before I take this medicine?  They need to know if you have any of these conditions:    breast cancer    depression    diabetes    heart failure    high blood pressure    kidney disease    liver disease    Parkinson's disease or a movement disorder    pheochromocytoma    seizures    stomach obstruction, bleeding, or perforation    an unusual or allergic reaction to metoclopramide, procainamide, sulfites, other medicines, foods, dyes, or preservatives    pregnant or trying to get pregnant    breast-feeding  What should I watch for while using this medicine?  It may take a few weeks for your stomach condition to start to get better. However, do not take this medicine for longer than 12 weeks. The longer you take this medicine, and the more you take it,  the greater your chances are of developing serious side effects. If you are an elderly patient, a female patient, or you have diabetes, you may be at an increased risk for side effects from this medicine. Contact your doctor immediately if you start having movements you cannot control such as lip smacking, rapid movements of the tongue, involuntary or uncontrollable movements of the eyes, head, arms and legs, or muscle twitches and spasms.  Patients and their families should watch out for worsening depression or thoughts of suicide. Also watch out for any sudden or severe changes in feelings such as feeling anxious, agitated, panicky, irritable, hostile, aggressive, impulsive, severely restless, overly excited and hyperactive, or not being able to sleep. If this happens, especially at the beginning of treatment or after a change in dose, call your doctor.  Do not treat yourself for high fever. Ask your doctor or health care professional for advice.  You may get drowsy or dizzy. Do not drive, use machinery, or do anything that needs mental alertness until you know how this drug affects you. Do not stand or sit up quickly, especially if you are an older patient. This reduces the risk of dizzy or fainting spells. Alcohol can make you more drowsy and dizzy. Avoid alcoholic drinks.  NOTE:This sheet is a summary. It may not cover all possible information. If you have questions about this medicine, talk to your doctor, pharmacist, or health care provider. Copyright  2017 Gold Standard      Serotonin syndrome symptoms usually occur within several hours of taking a new drug or increasing the dose of a drug you're already taking.   Signs and symptoms include:   Agitation or restlessness  Rapid heart rate and high blood pressure  Dilated pupils  Loss of muscle coordination or twitching muscles  Muscle rigidity  Heavy sweating  Diarrhea  Headache  Shivering, Goose bumps  Severe serotonin syndrome can be life-threatening. Signs  and symptoms include:High fever, Seizures, Irregular heartbeat, Unconsciousness              Follow-ups after your visit        Your next 10 appointments already scheduled     Aug 29, 2017 12:30 PM CDT   (Arrive by 12:15 PM)   NEW BLEEDING DISORDER with Cynthia Leach MD   [facility] Bleeding and Clotting ([facility])    [address]  [address] [facility] [address]   [phone]            Aug 30, 2017 11:00 AM CDT   (Arrive by 10:45 AM)   NEW FEMALE PELVIC with Wade Singh MD   [facility]rology and [email or web address] for Prostate and Urologic Cancers ([facility])    [address]  4th Floor  [facility] [id number] [phone]            Aug 30, 2017  1:30 PM CDT   (Arrive by 1:15 PM)   New Patient Visit with Rey Tello MD   [facility] Heart Care ([facility])    [address]  [address] [facility] [id number] [phone]            Aug 30, 2017  3:00 PM CDT   (Arrive by 2:45 PM)   New Patient Visit with Zoe Barahona MD   [facility] Ear Nose and Throat ([facility])    [address]  [address] [facility] [id number] [phone]            Sep 12, 2017  9:05 AM CDT   (Arrive by 8:50 AM)   New Patient Visit with Jerad Hebert MD   [facility] ([facility])    [address]  2nd Floor  [facility] [id number] [phone]            Sep 21, 2017 11:25 AM CDT   (Arrive by 11:10 AM)   Return Visit with Ellyn Rivera MD   [facility] Primary Care Clinic ([facility])    [address]  4th Floor  [facility] [id number] [phone]            Sep 21, 2017  2:00 PM CDT   (Arrive by 1:45 PM)   Return Visit with FIDELINA Ridley [facility] Neurology ([facility])    [address]  [address] [facility] [address]  "  [phone]            Oct 04, 2017  8:00 AM CDT   (Arrive by 7:45 AM)   New Patient Visit with Seth Galeano MD   [facility] Neurology ([facility])    [address] [place] [address] [phone]              Who to contact     Please call your clinic at [phone] to:    Ask questions about your health    Make or cancel appointments    Discuss your medicines    Learn about your test results    Speak to your doctor   If you have compliments or concerns about an experience at your clinic, or if you wish to file a complaint, please contact [facility] Patient Relations at [phone] or email us at [email or web address]         Additional Information About Your Visit        [facility] Information     [employer] gives you secure access to your electronic health record. If you see a primary care provider, you can also send messages to your care team and make appointments. If you have questions, please call your primary care clinic.  If you do not have a primary care provider, please call [phone] and they will assist you.      [employer] is an electronic gateway that provides easy, online access to your medical records. With [employer], you can request a clinic appointment, read your test results, renew a prescription or communicate with your care team.     To access your existing account, please contact your [facility] or call [phone] for assistance.        Care EveryWhere ID     This is your Care EveryWhere ID. This could be used by other organizations to access your [place] medical records  [id number]        Your Vitals Were     Pulse Height Last Period             102 1.676 m (5' 6\") 07/10/2017          Blood Pressure from Last 3 Encounters:   08/14/17 129/90   08/07/17 117/76   07/27/17 128/74    Weight from Last 3 Encounters:   08/07/17 88.5 kg (195 lb)   07/27/17 88.7 kg (195 lb 8 " oz)   07/17/17 83.9 kg (185 lb)              Today, you had the following     No orders found for display       Primary Care Provider Office Phone # Fax #    Ellyn Rivera [phone]       [address] [place] [address]  [facility] [address]        Equal Access to Services     LETA GUY : Hadii aad ku hadasho Soomaali, waaxda luqadaha, qaybta kaalmada adeegyada, waxay jerryin hayaan adealan sharongrant laeva . So [facility] [phone].    ATENCIÓN: Si habla español, tiene a sweeney disposición servicios gratuitos de asistencia lingüística. [facility] [phone].    We comply with applicable federal civil rights laws and [place] laws. We do not discriminate on the basis of race, color, national origin, age, disability sex, sexual orientation or gender identity.            Thank you!     Thank you for choosing [facility] NEUROLOGY  for your care. Our goal is always to provide you with excellent care. Hearing back from our patients is one way we can continue to improve our services. Please take a few minutes to complete the written survey that you may receive in the mail after your visit with us. Thank you!             Your Updated Medication List - Protect others around you: Learn how to safely use, store and throw away your medicines at www.disposemymeds.org.          This list is accurate as of: 8/14/17 10:14 AM.  Always use your most recent med list.                   Brand Name Dispense Instructions for use Diagnosis    ADVAIR DISKUS 250-50 MCG/DOSE diskus inhaler   Generic drug:  fluticasone-salmeterol           amitriptyline 10 MG tablet    ELAVIL    60 tablet    Take 1 tablet (10 mg) by mouth At Bedtime    Chronic migraine without aura without status migrainosus, not intractable       buprenorphine 10 MCG/HR WK patch    BUTRANS    4 patch    Place 1 patch onto the skin every 7 days    Postlaminectomy syndrome of lumbar region       cetirizine 10 MG tablet    zyrTEC          FLUTICASONE FUROATE NA       Spray 50 mcg in nostril At Bedtime        ketamine (KETALAR) 5%-gabapentin (NEURONTIN) 8%-lidocaine 2.5% 5%-8% Gel topical PLO cream     30 g    Apply 1 g topically 3 times daily as needed    Postlaminectomy syndrome of lumbosacral region, Postlaminectomy syndrome of lumbar region       levothyroxine 25 mcg/mL Susp    SYNTHROID     25 mcg every morning        lidocaine 5 % Patch    LIDODERM    30 patch    Apply up to 3 patches to painful area at once for up to 12 h within a 24 h period.  Remove after 12 hours.    Post laminectomy syndrome       methylPREDNISolone 4 MG tablet    MEDROL DOSEPAK    21 tablet    Follow package instructions    Intractable headache, unspecified chronicity pattern, unspecified headache type       MULTIVITAMIN PO      Take by mouth daily        omeprazole 20 MG CR capsule    priLOSEC     20 mg 2 times daily        * ondansetron 4 MG ODT tab    ZOFRAN-ODT     4 mg every 12 hours as needed        * ondansetron 4 MG ODT tab    ZOFRAN-ODT    60 tablet    Take 1-2 tablets (4-8 mg) by mouth every 12 hours as needed for nausea    Migraine without status migrainosus, not intractable, unspecified migraine type       * ondansetron 4 MG ODT tab    ZOFRAN ODT    20 tablet    Take 1-2 tablets (4-8 mg) by mouth every 8 hours as needed for nausea    Intractable headache, unspecified chronicity pattern, unspecified headache type       polyethylene glycol powder    MIRALAX/GLYCOLAX          PROBIOTIC DAILY PO      Take by mouth 2 times daily        rizatriptan 5 MG ODT tab    MAXALT-MLT    20 tablet    Take 1-2 tablets (5-10 mg) by mouth at onset of headache for migraine (max 30 mg in 24 hours)    Migraine without status migrainosus, not intractable, unspecified migraine type       SUMAtriptan 6 MG/0.5ML pen injector kit    IMITREX STATDOSE    2 kit    Inject 0.5 mLs (6 mg) Subcutaneous at onset of headache for migraine (may repeat once after 2 hrs) May repeat in 1 hour. Max 12 mg/24 hours.    Chronic  migraine without aura without status migrainosus, not intractable       tiZANidine 4 MG tablet    ZANAFLEX    90 tablet    Take 2 tablets (8 mg) by mouth 3 times daily as needed for muscle spasms    Post laminectomy syndrome       * Notice:  This list has 3 medication(s) that are the same as other medications prescribed for you. Read the directions carefully, and ask your doctor or other care provider to review them with you.

## 2017-08-14 NOTE — LETTER
"8/14/2017       RE: Ada Welch  [address] [place] [address]     Dear Colleague,    Thank you for referring your patient, Ada Welch, to the [facility] NEUROLOGY at [facility]. Please see a copy of my visit note below.    Re: Ada Welch      MRN# [id number]  YOB: 1970  Date of Visit: 8/14/2017    OUTPATIENT NEUROLOGY VISIT NOTE    Chief Complaint:  Hospital discharge follow up and headache evaluation    History of Present Illness  Ada Welch is a 46-year-old female presents to the clinic today for headache evaluation and post hospital discharge for acute headache treatment.   History of s/p  removal of posterior instrumentation (screws and guadalupe) with exploration of fusion, chronic low back pain,  Ehler Danlos syndrome, chronic headache. Has seen Dr Martinez at the Pain [facility]. Patient is currently has a Butran patch. Patient is flying to [place] in [place] to a neurosurgereon who specializes in Ehler Danlos syndrome    Headache History:      Onset History: at the age of 30 but headaches before. Both sisters suffer of migraine headaches. Patient saw Dr Staton, a neurologist at [place]     Current Headache Pattern:      Frequency (How many headache days per month?):  Daily for 2 month steady and before that 6-8 per month since December until July 4th. Headaches became more infrequent from 6 years until last year. After last pregnancy 6 years headaches were better until last year.   Headache worsen with activity and Laying down helps and Facial numbness since May of 2017. Headache became \"different\" in May with feeling \"buzzing\" feeling in her head.      Duration of Headache: max 24 hours in the past . Reports that headaches vary in intensity until they are gone. Gets more headaches in the afternoon.      Aura: \"colors change\" and metallic taste in her moth in the first hour before the headache   Associated Symptoms:  nausea and vision changes " "\"wavy vision\", \"fuzzy vision\", metallic taste in her mouth, light or noise sensitivity, \"light flickers\" or \"bright shows\" will amplify her headaches, vertigo       Description of Headache Pain & Location: burning, throbbing at times, aching that  bilateral in the temporal area, bilateral in the occipital area, thru the jaw and all over her shoulders. Headaches are worse with \"cracking and popping \" in her neck      Level of Pain as headache is starting:  3-5/10      Level of Pain during usual headache:  7/10      Level of Pain during the worst headache:  10/10      Do headaches interfere with or prevent usual activities or diminish your productivity at home or work?  Yes    Treatments Tried:  Amitriptyline 10 mg at bedtime started just one week ago by Dr Martinez  Tylenol and Benadryl daily for 2 month  Physical therapy which made her headaches worse  Sumatriptan injectable helps but causing \"muscle tightness\"\"sickness\" and \"dizziness\"  Rizatriptan -would help for a short time  Tizanidine prescribed by Dr Martinez  Medrol Dose pack-may be some relief  DHE injection which helped with cluster headaches in the past. Tried at her last hospitalization but would reduce her \"BP\"  metoclopramide helped with nausea  Ketorolac -helped but for a short time  Promethazine  Gabapentin -\"does nothing\" for me  Lyrica -\"no benefit\" from that     Have you needed to utilize the Emergency Room to treat your headache symptoms?  If so, how often and when was the last time used:  7/24/2017  For headache and confusion and numbness in her face, was evaluated by Neurology in the ED.     What makes your headaches worse or triggers your headaches? Has a hard time sleeping      Black out onset in July of 2017. Has seen a cardiologist in [facility]. LOC and lasting 30 seconds.  was \"there\" and did not witnessed anything. Happened twice in July. Reports that she \"black out\" in the past due to \"certain allergic reaction\" to medications occurred at " the age of 15. Reports that her father passed away from brain cancer when patient at the age of 15.   Has started having some body temperature fluctuation.   Denies history of head or neck trauma, seizure.   Neurodiagnostic Testing    MRI  MRI brain without and with contrast  MRA of the head without contrast  Neck MRA without and with contrast     Provided History:  look for carotid dissection- hx of Tom Danlos-  hx of headache with visual problems and left facial numbness.     Comparison:  CT abdomen 16 2013       Technique:   Brain MRI:  Axial diffusion, FLAIR, T2-weighted, susceptibility, and  coronal T1-weighted images were obtained without intravenous contrast.  Following intravenous gadolinium-based contrast administration, axial  and coronal T1-weighted images were obtained.    Head MRA: 3D time-of-flight MRA of the [place] was performed  without intravenous contrast.  Neck MRA:  Limited non contrast 2DTOF images were obtained of the  mid-cervical region. Following intravenous gadolinium-based contrast  administration, a contrast enhanced MRA of the neck/cervical vessels  was performed.  Three-dimensional reconstructions of the neck and head MRA were  created, which were reviewed by the radiologist.  Head MRV: 2D time-of-flight MR venogram (MRV) of the head was  performed without intravenous contrast. Following intravenous  gadolinium-based contrast administration, a contrast enhanced MRV of  the intracranial vessels was performed.     Dose: 7.5 mL Gadavist     Findings:   Brain MRI: Axial diffusion weighted images demonstrate no definite  acute infarct. On the FLAIR images, there is no significant  abnormality. There is no definite intracranial hemorrhage on  susceptibility images. Ventricles are proportionate to the cerebral  sulci. Contrast-enhanced images of the brain demonstrate no abnormal  intra- or extra-axial enhancement. Ethmoid sinus mucosal thickening.  Maxillary sinus mucosal  thickening. Fluid level in the left greater  than right maxillary sinus.     Head MRA demonstrates no definite aneurysm or stenosis of the major  intracranial arteries. The anterior communicating artery is patent.  Regarding the posterior communicating arteries, both are patent.     Neck MRA demonstrates patent major cervical arteries. The normal  distal right internal carotid artery measures 6 mm. The normal distal  left internal carotid artery measures 6 mm. Antegrade flow in the  major cervical vasculature. On the T2 and T1 fat saturated neck  imaging there is no evidence for dissection. Bilateral prominent lymph  nodes, likely reactive     Head MRV demonstrates no definite thrombosis or stenosis of the major  intracranial dural sinuses or deep cerebral veins. Postcontrast MRV  images do not demonstrate any definite intraluminal filling defect.         Impression:  1. No evidence of acute infarction or intracranial hemorrhage.  2. No abnormal enhancing lesions intracranially.  3. Head MRA demonstrates no definite aneurysm or stenosis of the major  intracranial arteries.  4. Neck MRA demonstrates patent major cervical arteries. No evidence  for dissection.  5. Normal MRV of the brain.  6. Air-fluid level in the left greater than right maxillary sinus, may  represent sinusitis in the correct clinical context.     I have personally reviewed the examination and initial interpretation  and I agree with the findings.     KOREY OVERTON MD           MR CERVICAL SPINE W/O & W CONTRAST 7/24/2017 6:25 PM     Provided History: neck pain numbness     Comparison: MRI 2/17/2016     Technique: Sagittal T1-weighted, sagittal T2-weighted, sagittal  diffusion weighted, axial T2-weighted, and axial T2* gradient echo  images of the cervical spine were obtained without intravenous  contrast. Following intravenous administration of gadolinium, axial  and sagittal T1-weighted images with fat saturation were  also  obtained.     Contrast: 7.5 mL Gadavist     Findings:  Mild loss of cervical lordosis with trace anterolisthesis of C4 over  C5.  There is mild to moderate disc height narrowing most prominent at  C5-C7.  There is normal signal within and normal contour of the  cervical spinal cord. Unchanged focus of T2 hyperintensity at the  level of C4-C5, most consistent with a mildly focally dilated central  canal. There is no abnormal contrast enhancement within the cervical  spinal cord, thecal sac or vertebral column.  The findings on a level  by level basis are as follows:     C2-3: Preserved disc height and normal increased T2 signal of the  disc. No spinal canal or neural foraminal narrowing.     C3-4: Preserved disc height is normal increased T2 signal of the disc.  No spinal canal or neural foraminal narrowing.     C4-5:  Mild loss of disc height. Preserved increased T2 signal of the  disc. Tiny broad-based disc bulge. Mild bilateral neuroforaminal  narrowing. No spinal canal narrowing.     C5-6:  Moderate loss of disc height is some loss of increased T2  signal of the disc. Small broad-based disc bulge. Moderate right  neuroforaminal narrowing. Spinal canal is preserved.     C6-7:  Moderate loss of disc height loss of increased T2 signal in  this. Small broad-based disc bulge. Preserved spinal canal. Mild right  neuroforaminal narrowing.     C7-T1:  No spinal canal or neural foraminal narrowing. Preserved disc  height no increased T2 signal of the disc.      No abnormality of the paraspinous soft tissues.         Impression:   1. No abnormal enhancement in the spinal cord, thecal sac or cervical  vertebrae.  2. Unchanged mild to moderate degenerative changes, most prominent  C4-C7. Moderate right neuroforaminal narrowing at C5-C6.      I have personally reviewed the examination and initial interpretation  and I agree with the findings.     KOREY OVERTON MD  Lab  LP on 7/26/2017  Lumbar Puncture using  Fluoroscopy     History:  check for low pressure headache. Please get Opening pressure  with legs extended.. Attempt at bedside.     Procedure note: Verbal and written consent for lumbar puncture was  obtained from the patient, and benefits and risk of the procedure were  explained, including but not limited to worsening headache,  hemorrhage, infection, lower extremity pain, or nerve root injury. The  patient was sterilely prepped and draped with the patient in the  prone/decubitus position, over the lower back. Under fluoroscopic  guidance, the interlaminar spaces were noted. 1% lidocaine was  administered for local anesthetic over the L2-3 interlaminar space,  and a 22 gauge 3.5 inch needle was advanced into the thecal sac under  fluoroscopic guidance.  There was initial show of clear CSF. Opening  pressure was 16.4 cm CSF. Approximately 10 cc of CSF were collected.     The needle was removed with the stylet in place. There was no  immediate complication associated with the procedure. Samples were  sent for the requested laboratory testing.       Estimated blood loss: Less than 1 cc.     Fluoroscopic time: 15.1 seconds         Impression: Successful lumbar puncture without immediate complication.  Normal opening pressure.     I, CARMEN PARK MD, attest that I was present for all critical  portions of the procedure and was immediately available to provide  guidance and assistance during the remainder of the procedure.     I have personally reviewed the examination and initial interpretation  and I agree with the findings.     CARMEN PARK MD    Results for JULIANO PANCHO KINA (MRN [id number]) as of 8/14/2017 09:19   Ref. Range 7/26/2017 10:30   RBC CSF Latest Ref Range: 0 - 2 /uL 0   WBC CSF Latest Ref Range: 0 - 5 /uL 0   Glucose CSF Latest Ref Range: 40 - 70 mg/dL 63   Protein Total CSF Latest Ref Range: 15 - 60 mg/dL 36   Results for PANCHO HENAO (MRN [id number]) as of 8/14/2017 09:19   Ref. 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     Chronic sinusitis 00/2007    Diagnosed with chronic pansinusitis 2016     Chronic tonsillitis 1980    Had tonsils removed 02/1981, rheumatic fever     Gastroesophageal reflux disease 3/1/2017    I have large hiatal hernia     Hernia, abdominal 10/2016    Hiatal Hernia     Hiatal hernia 2016    Have adeline hiatel hernia     Hoarseness Unsure    It comes and goes     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     Migraines 1/1/2007    Unsure of exact date     Nasal polyps 2013    Were revoved 03/2017, benign     Pneumonia Unsure    Have had pneumonia several times     Swelling, mass, or lump in head and neck 08/2016    Lymph node has been growing for last year     Thyroid disease 01/01/2008       Past Surgical History reviewed and verified with the patient  Past Surgical History:   Procedure Laterality Date     AS REPAIR OF NASAL SEPTUM       TONSILLECTOMY       TUBAL LIGATION       Family History reviewed and verified with the patient  Father-mets to his brain,  headache  Migraines-both sister  Mother-EDS type 3  Niece-\"seizures\" as a child    Social History:  Has 5 children-ages from 24, 23, 19, 13, 6.  of 25 years. Used to work as a . Moved from [place], [place] 2 months ago.     Social History   Substance Use Topics     Smoking status: Former Smoker     Packs/day: 15.00     Years: 0.25     Types: Other     Quit date: 7/17/2014     Smokeless tobacco: Never Used     Alcohol use No    reviewed and verified with the patient     Allergies   Allergen " Reactions     Bee Venom Anaphylaxis, Difficulty breathing, Palpitations, Shortness Of Breath and Visual Disturbance     Patient does carry Epi-Pen     Chicken-Derived Products (Egg) Diarrhea     Other reaction(s): Nausea  Patient will self monitor  Other reaction(s): GI intolerance     Gabapentin      Gluten Meal      Lactose Dermatitis and Diarrhea     Milk Protein Extract      Other reaction(s): GI intolerance\  EGGS      Topiramate      Wheat Diarrhea     Wheat Bran      Other reaction(s): Nausea  Patient will self monitor       Current Outpatient Prescriptions   Medication Sig Dispense Refill     buprenorphine (BUTRANS) 10 MCG/HR WK patch Place 1 patch onto the skin every 7 days 4 patch 0     amitriptyline (ELAVIL) 10 MG tablet Take 1 tablet (10 mg) by mouth At Bedtime 60 tablet 1     SUMAtriptan (IMITREX STATDOSE) 6 MG/0.5ML pen injector kit Inject 0.5 mLs (6 mg) Subcutaneous at onset of headache for migraine (may repeat once after 2 hrs) May repeat in 1 hour. Max 12 mg/24 hours. 2 kit 1     rizatriptan (MAXALT-MLT) 5 MG ODT tab Take 1-2 tablets (5-10 mg) by mouth at onset of headache for migraine (max 30 mg in 24 hours) 20 tablet 0     methylPREDNISolone (MEDROL DOSEPAK) 4 MG tablet Follow package instructions 21 tablet 0     ondansetron (ZOFRAN ODT) 4 MG ODT tab Take 1-2 tablets (4-8 mg) by mouth every 8 hours as needed for nausea 20 tablet 1     ondansetron (ZOFRAN-ODT) 4 MG ODT tab Take 1-2 tablets (4-8 mg) by mouth every 12 hours as needed for nausea 60 tablet 1     ketamine, KETALAR, 5%-gabapentin, NEURONTIN, 8%-lidocaine 2.5% 5%-8% GEL topical PLO cream Apply 1 g topically 3 times daily as needed 30 g 3     tiZANidine (ZANAFLEX) 4 MG tablet Take 2 tablets (8 mg) by mouth 3 times daily as needed for muscle spasms 90 tablet 3     levothyroxine (SYNTHROID) 25 mcg/mL SUSP 25 mcg every morning       omeprazole (PRILOSEC) 20 MG CR capsule 20 mg 2 times daily       ondansetron (ZOFRAN-ODT) 4 MG ODT tab 4 mg  "every 12 hours as needed       lidocaine (LIDODERM) 5 % Patch Apply up to 3 patches to painful area at once for up to 12 h within a 24 h period.  Remove after 12 hours. 30 patch 1     fluticasone-salmeterol (ADVAIR DISKUS) 250-50 MCG/DOSE diskus inhaler        cetirizine (ZYRTEC) 10 MG tablet        polyethylene glycol (MIRALAX/GLYCOLAX) powder        FLUTICASONE FUROATE NA Spray 50 mcg in nostril At Bedtime       Multiple Vitamins-Minerals (MULTIVITAMIN PO) Take by mouth daily       Probiotic Product (PROBIOTIC DAILY PO) Take by mouth 2 times daily     reviewed and verified with the patient    Review of Systems:  A 12-point ROS including constitutional, eyes, ENT, respiratory, cardiovascular, gastroenterology, genitourinary, integumentary, musculoskeletal, neurology, hematology and psychiatric were all reviewed with the patient and \"immune system does not work properly\" due to EDS, urological problems, has a loop recorderas mentioned in the HPI.     General Exam:   /90  Pulse 102  Ht 1.676 m (5' 6\")  LMP 07/10/2017  GEN: Awake, NAD; good eye contact, responses appropriately, headache is 7/10  HEENT: Head atraumatic/Normocephalic. Scalp normal. Occipital tenderness left >right, TMJ tenderness left >right.  Pupils equally round, 4 mm, reactive to light and accommodation, sclera and conjunctiva normal. Fundoscopic examination attempted not able to visualize right or left.   Neck: Easily moveable without resistance  Heart: S1/S2 appreciated, RRR, no m/r/g, no carotid bruits  Lungs:Lungs are clear to auscultation bilaterally, no wheezes or crackles.   Neurological Examination:  The patient is alert and oriented times four. Has good attention and concentration. Speech is fluent without dysarthria.   Cranial nerves:  CN I deferred.   CN II: Intact and full visual fields to confrontation bilaterally. CN III, IV, VI: EOM intact. There is no nystagmus. Has conjugated gaze. Intact direct and consensual pupillary " light reflexes.   CN V: Intact and symmetrical to facial sensation in the V1 through V3 bilaterally.   CN VII: Intact and symmetrical eyebrow and lid raise and eyelid closure, smiles and frown.   CN VIII: Intact to finger rub bilaterally.   CN IX and X: The palates elevates symmetrical. The uvula is midline.   CN XII: The tongue protrudes midline with no atrophy or fasciculations.   Motor exam: The patient has a normal bulk and tone throughout. There is no atrophy, fasciculations, clonus, or abnormal movements appreciated.   Strength Exam:  5/5 strength at shoulder abduction, elbow flexion or extension, wrist flexion or extension, finger abduction, , hip flexion and extension, knee flexion and extension, and dorsiflexion and plantarflexion bilaterally.   Sensation is intact to light touch  throughout. Reflexes are symmetrical at biceps, triceps, brachioradialis, patellar, and Achilles.   Negative Babinski with downgoing toes bilaterally.   Coordination reveals finger-nose-finger, rapid alternating movements, finger tapping, and toe tapping with normal speed and accuracy.   Station and gait is normal. There is no ataxia. Can walk on the toes, heels, and tandem walk without difficulty. Has good postural reflexes.Has no drift and a negative Romberg. Marline's negative        Assessment and Plan:  Hospital follow up. Headaches worsening for 2 months. Headache pattern changes in May of 2016. Appears to be mixed etiology or multifactorial. History of sinus surgery in December of 2016. Seen in the ED for acute headache and had negative work up, DHE admission with some partial relief, LP negative with normal findings on 7/26/2017.  Brain MRI on 7/24/2017 no intracranial causes of her headache worsening but some changes in her sinuses.    Occipital tenderness left >right, TMJ tenderness left >right.  Brain MRI some pansinusitis and metallic taste in her mouth, history of sinus surgery in December of 2016 -patient is  "already scheduled to see an ENT.   Patient is very happy with seeing Dr Martinez at the Pain Clinic. Patient started on Amitriptyline just one week ago. Patient is on Butran patch in November of 2016 for lower back pain which helped with \"tightness\" feeling in her lower legs and reduces pain flare. Butran may be causing worsening of her headache but patient feels that she benefits from Butran greatly.   Plan:  Continue amitriptyline, may try to increase the dose to 20 mg at bedtime. May consider other preventive medications if not effective.   Occipital nerve block. Will ask Dr Martinez from [facility]  [facility] referral  Myofascial release may be helpful  Acupuncture can be considered  Vitamin B2 (riboflavin) 400 mg daily OTC  Check with cardiologist about trying magnesium for headache  Drink water at least 8 glasses per day  Continue to follow up with Dr Martinez from [facility] Management. Botox can be considered as well  May try eletriptan vs sumatriptan. Limit use to no more than than 2 days per week due to risk of rebound headache and serotonin syndrome. But will wait until she sees hematologist for bruising. May try reglan for nausea as needed.     Syncope and reported black outs. Patient is scheduled to see a cardiologist.     I discussed all my recommendation with Ada Welch. The patient verbalizes understanding and comfortable with the plan. The patient has our clinic phone number to call with any questions or concerns. All of the patient's questions were answered from the best of my current knowledge.     Thank you for letting me be a part of the treatment team for Ada Welch      Time spent with pt answering questions, discussing findings, counseling and coordinating care was more than 50% the appointment time, 70  minutes.         FIDELINA Carlisle, CNP  [facility]        "

## 2017-08-15 ENCOUNTER — PRE VISIT (OUTPATIENT)
Dept: OTOLARYNGOLOGY | Facility: CLINIC | Age: 47
End: 2017-08-15

## 2017-08-15 ENCOUNTER — CARE COORDINATION (OUTPATIENT)
Dept: NEUROLOGY | Facility: CLINIC | Age: 47
End: 2017-08-15

## 2017-08-15 RX ORDER — METOCLOPRAMIDE 5 MG/1
5 TABLET ORAL EVERY 6 HOURS PRN
Qty: 20 TABLET | Refills: 1 | Status: SHIPPED | OUTPATIENT
Start: 2017-08-15 | End: 2017-09-13

## 2017-08-15 RX ORDER — RIZATRIPTAN BENZOATE 5 MG/1
5-10 TABLET, ORALLY DISINTEGRATING ORAL
Qty: 18 TABLET | Refills: 0 | Status: SHIPPED | OUTPATIENT
Start: 2017-08-15 | End: 2017-09-12

## 2017-08-15 NOTE — TELEPHONE ENCOUNTER
Spoke to pt -- all her [place] records are scanned in chart.    Will email JORGE to her for [facility] -- [email or web address]

## 2017-08-15 NOTE — TELEPHONE ENCOUNTER
After speaking with the patient, a referral was placed in the patients chart for physical therapy per Dr. Martinez's request.

## 2017-08-15 NOTE — PROGRESS NOTES
Spoke to Ada about this and she understands.  She is requesting an order for maxalt.  She is flying tomorrow to [place] and she always gets a terrible headache when she flys.   She does not want to bring her stat pens and feels the maxalt would be good in a pinch.  She is now using [facility].  Please place order if in agreement.  Thank you!    ===View-only below this line===    ----- Message -----     From: Fara Varner APRN CNP     Sent: 8/15/2017   8:55 AM       To: Darlene Martínez RN  Subject: prescription                                     Please let her know that I will wait with eletriptan until she sees hematologist for bruising. Triptans may affect thrombocytes. So she should ask the hematologist if this is Ok to use.   Thank you

## 2017-08-16 ENCOUNTER — CARE COORDINATION (OUTPATIENT)
Dept: NEUROLOGY | Facility: CLINIC | Age: 47
End: 2017-08-16

## 2017-08-16 ASSESSMENT — ENCOUNTER SYMPTOMS
SORE THROAT: 1
DISTURBANCES IN COORDINATION: 1
HEADACHES: 1
HEARTBURN: 1
ABDOMINAL PAIN: 1
HALLUCINATIONS: 0
RECTAL PAIN: 0
MUSCLE CRAMPS: 1
DIARRHEA: 1
FEVER: 1
ORTHOPNEA: 0
EYE PAIN: 1
NAUSEA: 1
NAUSEA: 1
CHILLS: 1
HEMATURIA: 0
FATIGUE: 1
DOUBLE VISION: 1
NUMBNESS: 1
NECK MASS: 1
BLOATING: 1
DISTURBANCES IN COORDINATION: 1
ORTHOPNEA: 0
EYE PAIN: 1
ABDOMINAL PAIN: 1
TASTE DISTURBANCE: 1
PALPITATIONS: 1
BACK PAIN: 1
DOUBLE VISION: 1
BLOATING: 1
LEG SWELLING: 1
PALPITATIONS: 1
POLYDIPSIA: 1
JOINT SWELLING: 1
SEIZURES: 0
TREMORS: 1
MEMORY LOSS: 1
VOMITING: 1
CONSTIPATION: 1
MUSCLE CRAMPS: 1
VOMITING: 1
PARALYSIS: 0
BRUISES/BLEEDS EASILY: 1
SYNCOPE: 1
JAUNDICE: 0
TINGLING: 1
SEIZURES: 0
LEG SWELLING: 1
ALTERED TEMPERATURE REGULATION: 1
STIFFNESS: 1
DIARRHEA: 1
CLAUDICATION: 1
NAIL CHANGES: 0
EYE WATERING: 1
MEMORY LOSS: 1
FEVER: 1
BOWEL INCONTINENCE: 0
LOSS OF CONSCIOUSNESS: 1
TROUBLE SWALLOWING: 1
SWOLLEN GLANDS: 1
JAUNDICE: 0
MUSCLE WEAKNESS: 1
NAIL CHANGES: 0
BACK PAIN: 1
DIZZINESS: 1
LEG PAIN: 1
EXERCISE INTOLERANCE: 1
BOWEL INCONTINENCE: 0
SKIN CHANGES: 1
LIGHT-HEADEDNESS: 1
EYE IRRITATION: 1
HYPOTENSION: 1
ALTERED TEMPERATURE REGULATION: 1
POLYPHAGIA: 0
FATIGUE: 1
SINUS PAIN: 1
SLEEP DISTURBANCES DUE TO BREATHING: 0
ARTHRALGIAS: 1
TACHYCARDIA: 1
BLOOD IN STOOL: 0
WEIGHT GAIN: 1
SLEEP DISTURBANCES DUE TO BREATHING: 0
BRUISES/BLEEDS EASILY: 1
NUMBNESS: 1
LEG PAIN: 1
RECTAL BLEEDING: 0
NIGHT SWEATS: 1
TINGLING: 1
WEAKNESS: 1
LIGHT-HEADEDNESS: 1
WEIGHT GAIN: 1
INCREASED ENERGY: 1
SINUS CONGESTION: 1
SKIN CHANGES: 1
HEMATURIA: 0
FLANK PAIN: 1
STIFFNESS: 1
WEIGHT LOSS: 0
SYNCOPE: 1
POLYDIPSIA: 1
SPEECH CHANGE: 0
EYE WATERING: 1
WEAKNESS: 1
HALLUCINATIONS: 0
JOINT SWELLING: 1
TROUBLE SWALLOWING: 1
EYE REDNESS: 1
HOARSE VOICE: 1
DECREASED APPETITE: 1
HEARTBURN: 1
MUSCLE WEAKNESS: 1
DIZZINESS: 1
EXERCISE INTOLERANCE: 1
MYALGIAS: 1
HYPERTENSION: 1
HYPERTENSION: 1
DYSURIA: 0
DIFFICULTY URINATING: 1
RECTAL BLEEDING: 0
NIGHT SWEATS: 1
POLYPHAGIA: 0
ARTHRALGIAS: 1
EYE REDNESS: 1
BLOOD IN STOOL: 0
SORE THROAT: 1
DYSURIA: 0
TACHYCARDIA: 1
TASTE DISTURBANCE: 1
PARALYSIS: 0
LOSS OF CONSCIOUSNESS: 1
FLANK PAIN: 1
WEIGHT LOSS: 0
SMELL DISTURBANCE: 1
POOR WOUND HEALING: 1
SPEECH CHANGE: 0
CHILLS: 1
DECREASED APPETITE: 1
NECK PAIN: 1
SINUS PAIN: 1
NECK PAIN: 1
SMELL DISTURBANCE: 1
EYE IRRITATION: 1
HYPOTENSION: 1
INCREASED ENERGY: 1
NECK MASS: 1
MYALGIAS: 1
RECTAL PAIN: 0
HEADACHES: 1
POOR WOUND HEALING: 1
DIFFICULTY URINATING: 1
TREMORS: 1
SWOLLEN GLANDS: 1
HOARSE VOICE: 1
CONSTIPATION: 1
CLAUDICATION: 1
SINUS CONGESTION: 1

## 2017-08-16 NOTE — PROGRESS NOTES
----- Message -----      From: Fara Varner APRN CNP      Sent: 8/15/2017   3:34 PM        To: Darlene Martínez RN   Subject: RE: prescription                                 I refilled rizatriptan. Should not take it with sumatriptan. Please let her know. Thank you   ----- Message -----      From: Darlene Martínez RN      Sent: 8/15/2017   9:39 AM        To: FIDELINA Ridley CNP   Subject: RE: prescription                                 Spoke to Ada about this and she understands.  She is requesting an order for maxalt.  She is flying tomorrow to [place] and she always gets a terrible headache when she flys.   She does not want to bring her stat pens and feels the maxalt would be good in a pinch.  She is now using [facility].  Please place order if in agreement.   Thank you!     ----- Message -----      From: Fara Varner APRN CNP      Sent: 8/15/2017   8:55 AM        To: Darlene Martínez RN   Subject: prescription                                     Please let her know that I will wait with eletriptan until she sees hematologist for bruising. Triptans may affect thrombocytes. So she should ask the hematologist if this is Ok to use.   Thank you     =====================================================================    8/16/17: spoke with patient and explained that she is not to use both sumatriptan and maxalt. She should use one or the other. Patient verbalized understanding and agreement with plan.

## 2017-08-17 ENCOUNTER — TELEPHONE (OUTPATIENT)
Dept: ANESTHESIOLOGY | Facility: CLINIC | Age: 47
End: 2017-08-17

## 2017-08-17 DIAGNOSIS — Q79.60 EDS (EHLERS-DANLOS SYNDROME): Primary | ICD-10-CM

## 2017-08-17 RX ORDER — TRAMADOL HYDROCHLORIDE 50 MG/1
50 TABLET ORAL EVERY 6 HOURS PRN
Qty: 30 TABLET | Refills: 0 | Status: SHIPPED | OUTPATIENT
Start: 2017-08-17 | End: 2017-09-12

## 2017-08-17 NOTE — TELEPHONE ENCOUNTER
patient called due to her having increased burning pain while in [place] at a neurosurgeon second opinion. Patient is having to do may tests that she is having tolerating the pain. She is hoping to stay out of the ED while she is there. Patient is using all medicinal therapies that are available to her without any relief. Patient referring  Will not prescribe any medications until he sees her on Monday. This patient is wondering if there is anything that can help control her pain while traveling. Patient is also using heat and ice as needed for comfort. Patient understands that she is most likely experiencing nerve pain and many medications do not treat the kind of pain she is experiencing, she would like something to take the edge off. This nurse will contact Dr. Martinez and then discuss plan of care with the patient.

## 2017-08-18 NOTE — TELEPHONE ENCOUNTER
Patient was left a message on her phone and made aware that the prescriptions for tramadol was called into the pharmacy that she chose. While calling in the script the pharmacy staff did not feel comfortable filling the medication due to her other medications that she is on. Patient made aware of the situation.

## 2017-08-18 NOTE — TELEPHONE ENCOUNTER
patient returned call and gave the name of a different pharmacy in [place] that her prescription  For tramadol could be faxed. Patient called and updated that script was faxed. Patient and this RN also discussed the the option of an occipital nerve block. This RN explained to patient that this was an option for her and this could be scheduled when she returns from her appointments in [place].

## 2017-08-21 ENCOUNTER — TRANSFERRED RECORDS (OUTPATIENT)
Dept: HEALTH INFORMATION MANAGEMENT | Facility: CLINIC | Age: 47
End: 2017-08-21

## 2017-08-23 ENCOUNTER — PRE VISIT (OUTPATIENT)
Dept: UROLOGY | Facility: CLINIC | Age: 47
End: 2017-08-23

## 2017-08-23 NOTE — TELEPHONE ENCOUNTER
Patient with history of UUI coming in for consult with Dr. Singh. Patient chart reviewed, no need for call, all records available and ready for appointment.

## 2017-08-23 NOTE — TELEPHONE ENCOUNTER
LVM for pt to return call -- need JORGE from [facility].    Will fax request to [facility] in attempt for records

## 2017-08-24 ENCOUNTER — PRE VISIT (OUTPATIENT)
Dept: CARDIOLOGY | Facility: CLINIC | Age: 47
End: 2017-08-24

## 2017-08-24 ENCOUNTER — THERAPY VISIT (OUTPATIENT)
Dept: PHYSICAL THERAPY | Facility: CLINIC | Age: 47
End: 2017-08-24
Payer: COMMERCIAL

## 2017-08-24 DIAGNOSIS — M54.2 CERVICALGIA: Primary | ICD-10-CM

## 2017-08-24 DIAGNOSIS — R00.0 TACHYCARDIA: Primary | ICD-10-CM

## 2017-08-24 DIAGNOSIS — Q79.60 EHLERS-DANLOS SYNDROME: ICD-10-CM

## 2017-08-24 PROCEDURE — 97161 PT EVAL LOW COMPLEX 20 MIN: CPT | Mod: GP | Performed by: PHYSICAL THERAPIST

## 2017-08-24 PROCEDURE — 97110 THERAPEUTIC EXERCISES: CPT | Mod: GP | Performed by: PHYSICAL THERAPIST

## 2017-08-24 NOTE — TELEPHONE ENCOUNTER
Records received from [facility].   Included  Office notes: 9/16/16, 10/6/16, 10/17/16, 10/21/16, 11/7/16, 12/13/16, 12/14/16, 12/23/16, 1/9/17, 2/6/17, 3/29/17, 3/27/17, 4/4/17  ED notes: 10/15/16  Radiology reports: US soft tissue neck thyroid head 8/30/16, 3/14/17  CT maxio facial 9/22/16  CT neck soft tissue 9/29/16, 1/6/17  CT chest pelvis 9/29/16, 1/6/17  US abdomen 2/20/17  US needle BX 4/4/17  Op/Procedure notes: endoscopic sinus investigation 12/13/16  Pathology reports: 12/13/16, endoscopy 3/9/17  Other: PFT 11/15/16, labs     Missing/needed records: [place] records

## 2017-08-24 NOTE — MR AVS SNAPSHOT
After Visit Summary   8/24/2017    Ada Welch    MRN: [id number]           Patient Information     Date Of Birth          1970        Visit Information        Provider Department      8/24/2017 1:30 PM Armani Leon PT [facility]        Today's Diagnoses     Cervicalgia    -  1    Tom-Danlos syndrome           Follow-ups after your visit        Your next 10 appointments already scheduled     Aug 29, 2017 12:30 PM CDT   (Arrive by 12:15 PM)   NEW BLEEDING DISORDER with Cynthia Leach MD   [facility] Bleeding and Clotting ([facility])    [address] [place] [address]  3rd [facility] [address]   [phone]            Aug 30, 2017 11:00 AM CDT   (Arrive by 10:45 AM)   NEW FEMALE PELVIC with Wade Singh MD   [facility]rology [facility] for Prostate [facility] Urologic Cancers ([facility])    [address] [place] [address]  4th [facility] [address]   [phone]            Aug 30, 2017  1:30 PM CDT   (Arrive by 1:15 PM)   New Patient Visit with Rey Tello MD   [facility] ([facility])    [address] [place] [address]  3rd [facility] [address]   [phone]            Aug 30, 2017  3:00 PM CDT   (Arrive by 2:45 PM)   New Patient Visit with Zoe Barahona MD   [facility] Ear Nose and Throat ([facility])    [address] [place] [address]   [phone]            Sep 06, 2017 11:20 AM CDT   DARIA Spine with Armani Leon PT   [facility] ([facility] [place])    [address] [place] [address]  [address]  [place] [address]   [phone]            Sep 12, 2017  9:05 AM CDT   (Arrive by 8:50 AM)   New Patient Visit with Jerad Hebert MD   [facility] ([facility])    [address] [place] [address]  2nd [facility]  [address]   [phone]            Sep 13, 2017 11:20 AM CDT   DARIA Spine with Armani Leon PT   [facility] ([facility] [place])    [address] [place] [address]  [address]  [place] [address]   [phone]            Sep 20, 2017 10:40 AM CDT   DARIA Spine with Armani Leon PT   [facility] ([facility] [place])    [address] [place] [address]  [address]  [place] [address]   [phone]            Sep 21, 2017 11:25 AM CDT   (Arrive by 11:10 AM)   Return Visit with Ellyn Rivera MD   [facility] ([facility])    [address] [place] [address] [phone]            Sep 21, 2017  2:00 PM CDT   (Arrive by 1:45 PM)   Return Visit with FIDELINA Ridley [facility] ([facility])    [address] [place] [address] [phone]              Future tests that were ordered for you today     Open Future Orders        Priority Expected Expires Ordered    EKG 12-lead, tracing only (Future) Routine 8/30/2017 12/22/2017 8/24/2017            Who to contact     If you have questions or need follow up information about today's clinic visit or your schedule please contact [facility] directly at [phone].  Normal or non-critical lab and imaging results will be communicated to you by MyChart, letter or phone within 4 business days after the clinic has received the results. If you do not hear from us within 7 days, please contact the clinic through MyChart or phone. If you have a critical or abnormal lab result, we will notify you by phone as soon as possible.  Submit refill requests through [employer] or call your pharmacy and they will forward the refill request to us. Please allow 3 business days for your refill to be completed.           Additional Information About Your Visit        MyChart Information     [employer] gives you secure access to your electronic health record. If you see a primary care provider, you can also send messages to your care team and make appointments. If you have questions, please call your primary care clinic.  If you do not have a primary care provider, please call [phone] and they will assist you.        Care EveryWhere ID     This is your Care EveryWhere ID. This could be used by other organizations to access your [place] medical records  [id number]        Your Vitals Were     Last Period                   07/10/2017            Blood Pressure from Last 3 Encounters:   08/14/17 129/90   08/07/17 117/76   07/27/17 128/74    Weight from Last 3 Encounters:   08/07/17 88.5 kg (195 lb)   07/27/17 88.7 kg (195 lb 8 oz)   07/17/17 83.9 kg (185 lb)              We Performed the Following     HC PT EVAL, LOW COMPLEXITY     DARIA INITIAL EVAL REPORT     THERAPEUTIC EXERCISES        Primary Care Provider Office Phone # Fax #    SteeleDenise Rivera [phone]       [address] [place] [address] 741  [facility] [address]        Equal Access to Services     LETA GUY : Hadii aad ku hadasho Soomaali, waaxda luqadaha, qaybta kaalmada adeegyada, odette whitehead hayjúniorn pawan dykes ah. So [facility] [phone].    ATENCIÓN: Si habla español, tiene a sweeney disposición servicios gratuitos de asistencia lingüística. Isaías al [phone].    We comply with applicable federal civil rights laws and [place] laws. We do not discriminate on the basis of race, color, national origin, age, disability sex, sexual orientation or gender identity.            Thank you!     Thank you for choosing [facility]  for your care. Our goal is always to provide you with excellent care. Hearing back from our patients is one way we can continue to improve our services. Please take a few  minutes to complete the written survey that you may receive in the mail after your visit with us. Thank you!             Your Updated Medication List - Protect others around you: Learn how to safely use, store and throw away your medicines at www.disposemymeds.org.          This list is accurate as of: 8/24/17 11:59 PM.  Always use your most recent med list.                   Brand Name Dispense Instructions for use Diagnosis    ADVAIR DISKUS 250-50 MCG/DOSE diskus inhaler   Generic drug:  fluticasone-salmeterol           amitriptyline 10 MG tablet    ELAVIL    60 tablet    Take 1 tablet (10 mg) by mouth At Bedtime    Chronic migraine without aura without status migrainosus, not intractable       buprenorphine 10 MCG/HR WK patch    BUTRANS    4 patch    Place 1 patch onto the skin every 7 days    Postlaminectomy syndrome of lumbar region       cetirizine 10 MG tablet    zyrTEC          FLUTICASONE FUROATE NA      Spray 50 mcg in nostril At Bedtime        ketamine (KETALAR) 5%-gabapentin (NEURONTIN) 8%-lidocaine 2.5% 5%-8% Gel topical PLO cream     30 g    Apply 1 g topically 3 times daily as needed    Postlaminectomy syndrome of lumbosacral region, Postlaminectomy syndrome of lumbar region       levothyroxine 25 mcg/mL Susp    SYNTHROID     25 mcg every morning        lidocaine 5 % Patch    LIDODERM    30 patch    Apply up to 3 patches to painful area at once for up to 12 h within a 24 h period.  Remove after 12 hours.    Post laminectomy syndrome       methylPREDNISolone 4 MG tablet    MEDROL DOSEPAK    21 tablet    Follow package instructions    Intractable headache, unspecified chronicity pattern, unspecified headache type       metoclopramide 5 MG tablet    REGLAN    20 tablet    Take 1 tablet (5 mg) by mouth every 6 hours as needed    Intractable chronic migraine without aura and with status migrainosus       MULTIVITAMIN PO      Take by mouth daily        omeprazole 20 MG CR capsule    priLOSEC     20 mg 2  times daily        * ondansetron 4 MG ODT tab    ZOFRAN-ODT     4 mg every 12 hours as needed        * ondansetron 4 MG ODT tab    ZOFRAN-ODT    60 tablet    Take 1-2 tablets (4-8 mg) by mouth every 12 hours as needed for nausea    Migraine without status migrainosus, not intractable, unspecified migraine type       * ondansetron 4 MG ODT tab    ZOFRAN ODT    20 tablet    Take 1-2 tablets (4-8 mg) by mouth every 8 hours as needed for nausea    Intractable headache, unspecified chronicity pattern, unspecified headache type       polyethylene glycol powder    MIRALAX/GLYCOLAX          PROBIOTIC DAILY PO      Take by mouth 2 times daily        rizatriptan 5 MG ODT tab    MAXALT-MLT    18 tablet    Take 1-2 tablets (5-10 mg) by mouth at onset of headache for migraine (max 30 mg in 24 hours)    Migraine without status migrainosus, not intractable, unspecified migraine type       SUMAtriptan 6 MG/0.5ML pen injector kit    IMITREX STATDOSE    2 kit    Inject 0.5 mLs (6 mg) Subcutaneous at onset of headache for migraine (may repeat once after 2 hrs) May repeat in 1 hour. Max 12 mg/24 hours.    Chronic migraine without aura without status migrainosus, not intractable       tiZANidine 4 MG tablet    ZANAFLEX    90 tablet    Take 2 tablets (8 mg) by mouth 3 times daily as needed for muscle spasms    Post laminectomy syndrome       traMADol 50 MG tablet    ULTRAM    30 tablet    Take 1 tablet (50 mg) by mouth every 6 hours as needed for pain maximum 6 tablet(s) per day    EDS (Tom-Danlos syndrome)       * Notice:  This list has 3 medication(s) that are the same as other medications prescribed for you. Read the directions carefully, and ask your doctor or other care provider to review them with you.

## 2017-08-24 NOTE — LETTER
[facility] PHYSICALTHERAP[facility]  [address] [place] [address]  [address]  [facility] [id number] [phone]    2017    Re: Ada Welch   :   1970  MRN:  [id number]   REFERRING PHYSICIAN:   Faiza Martinez    [facility]    Date of Initial Evaluation:  2017  Visits:  Rxs Used: 1  Reason for Referral:     Cervicalgia  Tom-Danlos syndrome    EVALUATION SUMMARY    Subjective:    Patient is a 46 year old female presenting with rehab cervical spine hpi.   Ada Welch is a 46 year old female with a cervical spine condition.  Condition occurred with:  Insidious onset.  Condition occurred: for unknown reasons.  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 PMH includes lumbar fusion and revision, 2013 and 2014, chiari malformation, dysautonomia and possible neuropathy. Cervical instability is creating neck pain and headaches. Currently instructed to wear neck brace ..    Patient reports pain:  Cervical right side, cervical left side, central cervical spine, upper cervical spine, mid cervical spine and lower cervical spine.  Radiates to:  Upper arm right, upper arm left, lower arm right, lower arm left, hand right, hand left and head.  Pain is described as aching and is constant and reported as 6/10.  Associated symptoms:  Loss of motion/stiffness, numbness, headache, drop attack, dizziness, loss of balance, visual disturbances and tingling. Pain is worse during the day.  Symptoms are exacerbated by rotating head, looking up or down and change of position and relieved by rest.  Since onset symptoms are gradually worsening.  Special tests:  X-ray and MRI.  Previous treatment includes physical therapy and surgery.  There was mild improvement following previous treatment.  General health as reported by patient is fair.      Other surgeries include:   Orthopedic surgery. Barriers include:  None as reported by the patient. Red flags:  None as reported by the patient.    Objective:  Cervical/Thoracic Evaluation  AROM:  AROM Cervical:  Flexion:            WNL  Extension:       Moderate loss  Rotation:         Left: moderate loss     Right: minor loss  Side Bend:      Left: major loss     Right:  Major loss  Headaches: cervical  Cervical Myotomes:  normal  Cervical Dermatomes:  not assessed  Cervical Palpation:  not assessed  Cervical Stability/Joint Clearing:  Stability/joint clearing spine: Not assessed due to instability.  Spinal Segmental Conclusions:  Upper cervical instability related EDS.  Cord Sign:  not assessed    Assessment/Plan:    Patient is a 46 year old female with cervical complaints.    Patient has the following significant findings with corresponding treatment plan.                Diagnosis 1:  Cervical instability, neck pain  Pain -  manual therapy, splint/taping/bracing/orthotics, self management, education and home program  Impaired balance - neuro re-education, therapeutic activities and home program  Impaired muscle performance - neuro re-education and home program  Decreased function - therapeutic activities and home program  Instability -  Therapeutic Activity  Therapeutic Exercise  home program  Therapy Evaluation Codes:   1) History comprised of:   Personal factors that impact the plan of care:      Time since onset of symptoms.    Comorbidity factors that impact the plan of care are:      Numbness/tingling.     Medications impacting care: None.  2) Examination of Body Systems comprised of:   Body structures and functions that impact the plan of care:      Cervical spine.   Activity limitations that impact the plan of care are:      Reading/Computer work and Sitting.  3) Clinical presentation characteristics are:   Evolving/Changing.  4) Decision-Making    Moderate complexity using standardized patient assessment instrument and/or  measureable assessment of functional outcome.  Cumulative Therapy Evaluation is: Moderate complexity.  Previous and current functional limitations:  (See Goal Flow Sheet for this information)    Short term and Long term goals: (See Goal Flow Sheet for this information)   Communication ability:  Patient appears to be able to clearly communicate and understand verbal and written communication and follow directions correctly.  Treatment Explanation - The following has been discussed with the patient:   RX ordered/plan of care  Anticipated outcomes  Possible risks and side effects  This patient would benefit from PT intervention to resume normal activities.   Rehab potential is good.  Frequency:  1 X week, once daily  Duration:  for 6 weeks  Discharge Plan:  Achieve all LTG.  Independent in home treatment program.  Reach maximal therapeutic benefit.      Thank you for your referral.    INQUIRIES  Therapist: Armani Leon, PT    [facility]  [address] [place] [address]  [address]  [place] [address]  Phone: [phone]  Fax: [phone]

## 2017-08-24 NOTE — TELEPHONE ENCOUNTER
[place] records are scanned in pt's chart under MEDIA tab. Per pt, she does not have any recent [place] records.

## 2017-08-25 PROBLEM — M54.2 CERVICALGIA: Status: ACTIVE | Noted: 2017-08-25

## 2017-08-25 NOTE — PROGRESS NOTES
Subjective:    Patient is a 46 year old female presenting with rehab cervical spine hpi.   Ada Welch is a 46 year old female with a cervical spine condition.  Condition occurred with:  Insidious onset.  Condition occurred: for unknown reasons.  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 PMH includes lumbar fusion and revision, 2013 and 2014, chiari malformation, dysautonomia and possible neuropathy. Cervical instability is creating neck pain and headaches. Currently instructed to wear neck brace 24/7..    Patient reports pain:  Cervical right side, cervical left side, central cervical spine, upper cervical spine, mid cervical spine and lower cervical spine.  Radiates to:  Upper arm right, upper arm left, lower arm right, lower arm left, hand right, hand left and head.  Pain is described as aching and is constant and reported as 6/10.  Associated symptoms:  Loss of motion/stiffness, numbness, headache, drop attack, dizziness, loss of balance, visual disturbances and tingling. Pain is worse during the day.  Symptoms are exacerbated by rotating head, looking up or down and change of position and relieved by rest.  Since onset symptoms are gradually worsening.  Special tests:  X-ray and MRI.  Previous treatment includes physical therapy and surgery.  There was mild improvement following previous treatment.  General health as reported by patient is fair.      Other surgeries include:  Orthopedic surgery.            Barriers include:  None as reported by the patient.    Red flags:  None as reported by the patient.                        Objective:    System              Cervical/Thoracic Evaluation    AROM:  AROM Cervical:    Flexion:            WNL  Extension:       Moderate loss  Rotation:         Left: moderate loss     Right: minor loss  Side Bend:      Left: major loss     Right:  Major loss      Headaches: cervical  Cervical Myotomes:   normal                      Cervical Dermatomes:  not assessed                    Cervical Palpation:  not assessed        Cervical Stability/Joint Clearing:  Stability/joint clearing spine: Not assessed due to instability.      Spinal Segmental Conclusions:  Upper cervical instability related EDS.        Cord Sign:  not assessed                                            General     ROS    Assessment/Plan:      Patient is a 46 year old female with cervical complaints.    Patient has the following significant findings with corresponding treatment plan.                Diagnosis 1:  Cervical instability, neck pain  Pain -  manual therapy, splint/taping/bracing/orthotics, self management, education and home program  Impaired balance - neuro re-education, therapeutic activities and home program  Impaired muscle performance - neuro re-education and home program  Decreased function - therapeutic activities and home program  Instability -  Therapeutic Activity  Therapeutic Exercise  home program    Therapy Evaluation Codes:   1) History comprised of:   Personal factors that impact the plan of care:      Time since onset of symptoms.    Comorbidity factors that impact the plan of care are:      Numbness/tingling.     Medications impacting care: None.  2) Examination of Body Systems comprised of:   Body structures and functions that impact the plan of care:      Cervical spine.   Activity limitations that impact the plan of care are:      Reading/Computer work and Sitting.  3) Clinical presentation characteristics are:   Evolving/Changing.  4) Decision-Making    Moderate complexity using standardized patient assessment instrument and/or measureable assessment of functional outcome.  Cumulative Therapy Evaluation is: Moderate complexity.    Previous and current functional limitations:  (See Goal Flow Sheet for this information)    Short term and Long term goals: (See Goal Flow Sheet for this information)     Communication  ability:  Patient appears to be able to clearly communicate and understand verbal and written communication and follow directions correctly.  Treatment Explanation - The following has been discussed with the patient:   RX ordered/plan of care  Anticipated outcomes  Possible risks and side effects  This patient would benefit from PT intervention to resume normal activities.   Rehab potential is good.    Frequency:  1 X week, once daily  Duration:  for 6 weeks  Discharge Plan:  Achieve all LTG.  Independent in home treatment program.  Reach maximal therapeutic benefit.    Please refer to the daily flowsheet for treatment today, total treatment time and time spent performing 1:1 timed codes.

## 2017-08-30 ENCOUNTER — OFFICE VISIT (OUTPATIENT)
Dept: OTOLARYNGOLOGY | Facility: CLINIC | Age: 47
End: 2017-08-30

## 2017-08-30 ENCOUNTER — ALLIED HEALTH/NURSE VISIT (OUTPATIENT)
Dept: CARDIOLOGY | Facility: CLINIC | Age: 47
End: 2017-08-30
Attending: INTERNAL MEDICINE
Payer: COMMERCIAL

## 2017-08-30 ENCOUNTER — OFFICE VISIT (OUTPATIENT)
Dept: UROLOGY | Facility: CLINIC | Age: 47
End: 2017-08-30

## 2017-08-30 VITALS
WEIGHT: 204 LBS | BODY MASS INDEX: 32.02 KG/M2 | HEIGHT: 67 IN | DIASTOLIC BLOOD PRESSURE: 94 MMHG | SYSTOLIC BLOOD PRESSURE: 148 MMHG | HEART RATE: 106 BPM

## 2017-08-30 VITALS
HEART RATE: 103 BPM | WEIGHT: 204 LBS | OXYGEN SATURATION: 95 % | BODY MASS INDEX: 32.02 KG/M2 | HEIGHT: 67 IN | DIASTOLIC BLOOD PRESSURE: 106 MMHG | SYSTOLIC BLOOD PRESSURE: 145 MMHG

## 2017-08-30 VITALS — WEIGHT: 204 LBS | HEIGHT: 66 IN | BODY MASS INDEX: 32.78 KG/M2

## 2017-08-30 DIAGNOSIS — R00.0 TACHYCARDIA: ICD-10-CM

## 2017-08-30 DIAGNOSIS — J32.4 CHRONIC PANSINUSITIS: Primary | ICD-10-CM

## 2017-08-30 DIAGNOSIS — R39.15 URINARY URGENCY: ICD-10-CM

## 2017-08-30 DIAGNOSIS — R35.1 NOCTURIA: Primary | ICD-10-CM

## 2017-08-30 DIAGNOSIS — K59.01 SLOW TRANSIT CONSTIPATION: ICD-10-CM

## 2017-08-30 DIAGNOSIS — I95.1 ORTHOSTATIC HYPOTENSION: Primary | ICD-10-CM

## 2017-08-30 DIAGNOSIS — Q79.60 EHLERS-DANLOS SYNDROME: Primary | ICD-10-CM

## 2017-08-30 PROBLEM — Z95.9 CARDIAC DEVICE IN SITU: Status: ACTIVE | Noted: 2017-08-30

## 2017-08-30 LAB
GRAM STN SPEC: NORMAL
GRAM STN SPEC: NORMAL
SPECIMEN SOURCE: NORMAL

## 2017-08-30 PROCEDURE — 93291 INTERROG DEV EVAL SCRMS IP: CPT | Mod: 26 | Performed by: INTERNAL MEDICINE

## 2017-08-30 PROCEDURE — 93291 INTERROG DEV EVAL SCRMS IP: CPT | Mod: ZF

## 2017-08-30 PROCEDURE — 99213 OFFICE O/P EST LOW 20 MIN: CPT | Mod: ZF

## 2017-08-30 PROCEDURE — 93010 ELECTROCARDIOGRAM REPORT: CPT | Mod: ZP | Performed by: INTERNAL MEDICINE

## 2017-08-30 PROCEDURE — 99212 OFFICE O/P EST SF 10 MIN: CPT | Mod: ZF

## 2017-08-30 PROCEDURE — 93005 ELECTROCARDIOGRAM TRACING: CPT | Mod: ZF

## 2017-08-30 PROCEDURE — 99214 OFFICE O/P EST MOD 30 MIN: CPT

## 2017-08-30 PROCEDURE — 99203 OFFICE O/P NEW LOW 30 MIN: CPT | Mod: 25 | Performed by: INTERNAL MEDICINE

## 2017-08-30 RX ORDER — FLUTICASONE PROPIONATE 50 MCG
2 SPRAY, SUSPENSION (ML) NASAL
COMMUNITY
End: 2017-12-15

## 2017-08-30 RX ORDER — PHENTERMINE HYDROCHLORIDE 37.5 MG/1
37.5 TABLET ORAL
COMMUNITY
End: 2017-09-12

## 2017-08-30 RX ORDER — CYCLOBENZAPRINE HCL 10 MG
10 TABLET ORAL
COMMUNITY
Start: 2016-07-01 | End: 2017-09-12

## 2017-08-30 RX ORDER — ERGOCALCIFEROL 1.25 MG/1
CAPSULE, LIQUID FILLED ORAL
COMMUNITY
End: 2017-09-12

## 2017-08-30 RX ORDER — MIDODRINE HYDROCHLORIDE 5 MG/1
5 TABLET ORAL
COMMUNITY
Start: 2016-02-24 | End: 2017-09-12

## 2017-08-30 RX ORDER — METOPROLOL SUCCINATE 100 MG/1
100 TABLET, EXTENDED RELEASE ORAL
COMMUNITY
End: 2017-09-12

## 2017-08-30 RX ORDER — EPINEPHRINE 0.3 MG/.3ML
0.3 INJECTION SUBCUTANEOUS
COMMUNITY
Start: 2016-08-08 | End: 2019-09-25

## 2017-08-30 RX ORDER — CITALOPRAM HYDROBROMIDE 20 MG/1
20 TABLET ORAL
COMMUNITY
End: 2017-09-12

## 2017-08-30 RX ORDER — ONDANSETRON 4 MG/1
4 TABLET, FILM COATED ORAL
COMMUNITY
Start: 2015-11-25 | End: 2017-09-12

## 2017-08-30 RX ORDER — THYROID 15 MG/1
15 TABLET ORAL
COMMUNITY
End: 2017-09-12

## 2017-08-30 RX ORDER — FLUOCINONIDE 0.5 MG/G
CREAM TOPICAL
COMMUNITY
Start: 2017-04-10 | End: 2017-09-12

## 2017-08-30 RX ORDER — DIAZEPAM 5 MG
5 TABLET ORAL
COMMUNITY
Start: 2016-01-16 | End: 2017-09-12

## 2017-08-30 RX ORDER — DIPHENOXYLATE HYDROCHLORIDE AND ATROPINE SULFATE 2.5; .025 MG/1; MG/1
TABLET ORAL
COMMUNITY
End: 2017-09-12

## 2017-08-30 RX ORDER — ETODOLAC 400 MG
400 TABLET ORAL
COMMUNITY
Start: 2017-06-23 | End: 2018-02-09

## 2017-08-30 RX ORDER — CITALOPRAM HYDROBROMIDE 40 MG/1
40 TABLET ORAL
COMMUNITY
End: 2017-09-12

## 2017-08-30 RX ORDER — ALBUTEROL SULFATE 90 UG/1
2 AEROSOL, METERED RESPIRATORY (INHALATION) EVERY 6 HOURS PRN
COMMUNITY
Start: 2016-01-22 | End: 2021-05-21

## 2017-08-30 RX ORDER — HYDROMORPHONE HYDROCHLORIDE 4 MG/1
4 TABLET ORAL
COMMUNITY
End: 2017-09-12

## 2017-08-30 RX ORDER — FUROSEMIDE 40 MG
40 TABLET ORAL
COMMUNITY
Start: 2016-02-15 | End: 2017-09-12

## 2017-08-30 RX ORDER — MELATONIN 2.5 MG
TABLET,CHEWABLE ORAL
COMMUNITY
Start: 2016-08-05 | End: 2017-09-12

## 2017-08-30 RX ORDER — DOXYCYCLINE 100 MG/1
100 CAPSULE ORAL 2 TIMES DAILY
Qty: 42 CAPSULE | Refills: 0 | Status: SHIPPED | OUTPATIENT
Start: 2017-08-30 | End: 2017-09-21

## 2017-08-30 RX ORDER — SENNOSIDES A AND B 8.6 MG/1
17.2-34.4 TABLET, FILM COATED ORAL
COMMUNITY
End: 2017-09-26

## 2017-08-30 RX ORDER — MELOXICAM 15 MG/1
15 TABLET ORAL
COMMUNITY
Start: 2016-06-16 | End: 2017-09-12

## 2017-08-30 RX ORDER — ZONISAMIDE 100 MG/1
100 CAPSULE ORAL
COMMUNITY
End: 2017-09-12

## 2017-08-30 RX ORDER — THYROID 60 MG/1
60 TABLET ORAL
COMMUNITY
End: 2017-09-12

## 2017-08-30 RX ORDER — FENTANYL 25 UG/1
25 PATCH TRANSDERMAL
COMMUNITY
Start: 2016-07-29 | End: 2017-09-12

## 2017-08-30 RX ORDER — LAMOTRIGINE 25 MG/1
25 TABLET ORAL
COMMUNITY
Start: 2016-07-29 | End: 2017-09-12

## 2017-08-30 ASSESSMENT — PAIN SCALES - GENERAL
PAINLEVEL: MILD PAIN (3)
PAINLEVEL: NO PAIN (0)
PAINLEVEL: NO PAIN (0)

## 2017-08-30 NOTE — NURSING NOTE
"Chief Complaint   Patient presents with     Consult     mixed incontinence, worse when constipated       Blood pressure (!) 148/94, pulse 106, height 1.689 m (5' 6.5\"), weight 92.5 kg (204 lb), not currently breastfeeding. Body mass index is 32.43 kg/(m^2).    Patient Active Problem List   Diagnosis     Pain syndrome, chronic     s/p TLIF L4-5,L5-S1 with solid arthrodesis     Tom-Danlos syndrome     Postlaminectomy syndrome, lumbar region     Headache     Cervicalgia       Allergies   Allergen Reactions     Bee Venom Anaphylaxis, Difficulty breathing, Palpitations, Shortness Of Breath and Visual Disturbance     Patient does carry Epi-Pen     Chicken-Derived Products (Egg) Diarrhea     Other reaction(s): Nausea  Patient will self monitor  Other reaction(s): GI intolerance     Gabapentin      Gluten Meal      Lactose Dermatitis and Diarrhea     Milk Protein Extract      Other reaction(s): GI intolerance     Topiramate      Wheat Diarrhea     Wheat Bran      Other reaction(s): Nausea  Patient will self monitor       Current Outpatient Prescriptions   Medication Sig Dispense Refill     acetaminophen-codeine (TYLENOL #3) 300-30 MG per tablet        EPINEPHrine (EPIPEN/ADRENACLICK/OR ANY BX GENERIC EQUIV) 0.3 MG/0.3ML injection 2-pack Inject 0.3 mg into the muscle       albuterol (PROAIR HFA/PROVENTIL HFA/VENTOLIN HFA) 108 (90 BASE) MCG/ACT Inhaler Inhale 2 puffs into the lungs       cyclobenzaprine (FLEXERIL) 10 MG tablet Take 10 mg by mouth       diazepam (VALIUM) 5 MG tablet Take 5 mg by mouth       fentaNYL (DURAGESIC) 25 mcg/hr 72 hr patch 25 mcg       furosemide (LASIX) 40 MG tablet Take 40 mg by mouth       lamoTRIgine (LAMICTAL) 25 MG tablet Take 25 mg by mouth       Melatonin Gummies 2.5 MG CHEW        meloxicam (MOBIC) 15 MG tablet Take 15 mg by mouth       midodrine (PROAMATINE) 5 MG tablet Take 5 mg by mouth       Thyroid (NATURE-THROID) 81.25 MG TABS Take 1 tablet by mouth       ondansetron (ZOFRAN) 4 MG " tablet Take 4 mg by mouth       etodolac (LODINE) 400 MG tablet Take 400 mg by mouth       fluocinonide (LIDEX) 0.05 % cream        citalopram (CELEXA) 40 MG tablet Take 40 mg by mouth       fluticasone (FLONASE) 50 MCG/ACT spray 2 sprays       HYDROmorphone (DILAUDID) 4 MG tablet Take 4 mg by mouth       metoprolol (TOPROL-XL) 100 MG 24 hr tablet Take 100 mg by mouth       phentermine (ADIPEX-P) 37.5 MG tablet Take 37.5 mg by mouth       vitamin D (ERGOCALCIFEROL) 57096 UNIT capsule        Multiple Vitamin (MULTI-VITAMINS) TABS        senna (SENOKOT) 8.6 MG tablet Take 17.2-34.4 mg by mouth       thyroid 15 MG TABS tablet Take 15 mg by mouth       thyroid (ARMOUR) 60 MG tablet Take 60 mg by mouth       zonisamide (ZONEGRAN) 100 MG capsule Take 100 mg by mouth       citalopram (CELEXA) 20 MG tablet Take 20 mg by mouth       traMADol (ULTRAM) 50 MG tablet Take 1 tablet (50 mg) by mouth every 6 hours as needed for pain maximum 6 tablet(s) per day 30 tablet 0     metoclopramide (REGLAN) 5 MG tablet Take 1 tablet (5 mg) by mouth every 6 hours as needed 20 tablet 1     rizatriptan (MAXALT-MLT) 5 MG ODT tab Take 1-2 tablets (5-10 mg) by mouth at onset of headache for migraine (max 30 mg in 24 hours) 18 tablet 0     buprenorphine (BUTRANS) 10 MCG/HR WK patch Place 1 patch onto the skin every 7 days 4 patch 0     amitriptyline (ELAVIL) 10 MG tablet Take 1 tablet (10 mg) by mouth At Bedtime 60 tablet 1     SUMAtriptan (IMITREX STATDOSE) 6 MG/0.5ML pen injector kit Inject 0.5 mLs (6 mg) Subcutaneous at onset of headache for migraine (may repeat once after 2 hrs) May repeat in 1 hour. Max 12 mg/24 hours. 2 kit 1     methylPREDNISolone (MEDROL DOSEPAK) 4 MG tablet Follow package instructions 21 tablet 0     ondansetron (ZOFRAN ODT) 4 MG ODT tab Take 1-2 tablets (4-8 mg) by mouth every 8 hours as needed for nausea 20 tablet 1     ondansetron (ZOFRAN-ODT) 4 MG ODT tab Take 1-2 tablets (4-8 mg) by mouth every 12 hours as needed  for nausea 60 tablet 1     ketamine, KETALAR, 5%-gabapentin, NEURONTIN, 8%-lidocaine 2.5% 5%-8% GEL topical PLO cream Apply 1 g topically 3 times daily as needed 30 g 3     tiZANidine (ZANAFLEX) 4 MG tablet Take 2 tablets (8 mg) by mouth 3 times daily as needed for muscle spasms 90 tablet 3     levothyroxine (SYNTHROID) 25 mcg/mL SUSP 25 mcg every morning       omeprazole (PRILOSEC) 20 MG CR capsule 20 mg 2 times daily       ondansetron (ZOFRAN-ODT) 4 MG ODT tab 4 mg every 12 hours as needed       fluticasone-salmeterol (ADVAIR DISKUS) 250-50 MCG/DOSE diskus inhaler        cetirizine (ZYRTEC) 10 MG tablet        polyethylene glycol (MIRALAX/GLYCOLAX) powder        FLUTICASONE FUROATE NA Spray 50 mcg in nostril At Bedtime       Multiple Vitamins-Minerals (MULTIVITAMIN PO) Take by mouth daily       Probiotic Product (PROBIOTIC DAILY PO) Take by mouth 2 times daily         Social History   Substance Use Topics     Smoking status: Former Smoker     Packs/day: 0.20     Years: 10.00     Types: Cigarettes     Start date: 1/1/1991     Quit date: 12/1/2013     Smokeless tobacco: Never Used      Comment: I somked on and off during specified dates.     Alcohol use No       Jeri Granados LPN  8/30/2017  10:42 AM

## 2017-08-30 NOTE — PATIENT INSTRUCTIONS
You were seen today in the Cardiovascular Clinic at the [facility].      Cardiology Providers you saw during your visit:  Dr. Haywood    Recommendations:  Please increase your sodium intake, this will help you from becoming dehydrated. 2) Please have an ECHO. 3) Please wear compression stockings.     Follow-up:  With Dr. Russell.    Thank you for your visit today!       Please call if you have any questions or concerns.  Cardiology Care Coordinator  Daisy Irby RN    For scheduling needs [phone] option 1 and the option 1 again.  Nursing questions: [phone] option 1 then chose option 3 for the triage nurse.  For emergencies call [phone].

## 2017-08-30 NOTE — PATIENT INSTRUCTIONS
Sleep study.  Follow up in approximately 3 months after sleep study.      It was a pleasure meeting with you today.  Thank you for allowing me and my team the privilege of caring for you today.  YOU are the reason we are here, and I truly hope we provided you with the excellent service you deserve.  Please let us know if there is anything else we can do for you so that we can be sure you are leaving completely satisfied with your care experience.

## 2017-08-30 NOTE — PROGRESS NOTES
"2017            Ellyn Rivera MD   [address] , [facility] [address]   [place]  [address]       RE:  Ada Welch   MRN:  [id number]   :  1970      Dear Dr. Rivera:      It was a pleasure participating in the care of your patient, Ms. Ada Welch.  As you know, she is a 46-year-old lady who I see today for \"dysautonomia.\"      Her past medical history is significant for the followin.  Tom-Danlos syndrome.   2.  Low back pain.   3.  Chronic headaches.   4.  Mast cell disorder.   5.  TMJ.   6.  Gastroparesis.   7.  Diverticulosis.      Her cardiac history is significant for having some form of dysautonomia according to her for which she had a loop recorder placed on 2015.  Old cardiac records are not available for review.      In terms of her present symptoms, she says that any time she is standing for any period of time or gets up quickly, she will feel lightheaded.  She has been on a beta blocker and midodrine in the past, but is currently not taking it.  She can only walk a short distance due to her skeletal issues and has had occasional bouts of syncope, last one in July.      Her loop recorder reveals anything from normal sinus rhythm to sinus tachycardia without gross malignant ventricular arrhythmias.      She has other multiple issues concerning her Tom-Danlos syndrome and was recently diagnosed as having \"dysautonomia\" as well.      She otherwise denies gross chest pains or shortness of breath.  She denies PND, orthopnea, edema.      In terms of her cardiac risk factors, no history of diabetes.  She quit smoking 3 years ago, smoked 4 cigarettes for 15 years on and off.  Grandmother had heart trouble in her 50s.  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      CURRENT MEDICATIONS:  Include inhalers, Reglan, amitriptyline.      PHYSICAL EXAMINATION:     VITAL SIGNS:  Her orthostatic blood " "pressure lying 132/92 with a pulse of 95, sitting 139/100 with a pulse of 98, standing 133/94 with a pulse of 111.  Lightheadedness and dizziness were recreated with a change in position.   NECK:  Reveals no obvious jugular venous distention.   LUNGS:  Clear to auscultation.  Respiratory effort is normal.   CARDIAC:  Reveals a regular rate and rhythm, no obvious murmur or gallop appreciated.   ABDOMEN:  Belly soft, nontender.   EXTREMITIES:  Without gross edema.      EKG today reveals normal sinus rhythm at a rate of 94 beats per minute, no gross ST changes.      Echocardiogram 08/25/2015 reveals ejection fraction of 55-60% without gross valvular pathology.  On 07/24/2017, potassium 4.0, GFR and hemoglobin normal.        IMPRESSION:      Ada is a 46-year-old lady with Tom-Danlos syndrome and multiple secondary complications who presents with a presumptive new diagnosis of \"dysautonomia.\"  Her loop recorder interrogation today reveals sinus rhythm and sinus tachycardia without other gross ventricular arrhythmias identified.  She does get dizzy with any changes in position without gross change in her blood pressure, but her pulse does increase somewhat.        PLAN:     1.  We will start with lifestyle modification, specifically increased sodium intake and compression stockings, to help with her symptoms of positional lightheadedness.  Additionally, she requests to see Dr. Russell for her condition, and we will help facilitate this.     2.  Check echocardiogram in the context of her Tom-Danlos syndrome for current dimensions of aorta.  We can consider further imaging of the entire aorta at some point in the future as well.      Once again, it was a pleasure participating in the care of your patient, Ms. Ada Welch.  Please feel free to contact me at anytime if there are any questions regarding her care in the future.         Sincerely,      YUSRA PICKARD MD             D: 08/30/2017 14:09   T: " 2017 18:23   MT: BRIAN      Name:     PANCHO HENAO   MRN:      [id number]        Account:      [id number]   :      1970      Document: [id number]       cc: Ellyn Rivera MD

## 2017-08-30 NOTE — LETTER
8/30/2017      RE: Ada Welch  [address] [place] [address]       Dear Colleague,    Thank you for the opportunity to participate in the care of your patient, Ada Welch, at the [facility]. Please see a copy of my visit note below.    ? #[id number]    Please do not hesitate to contact me if you have any questions/concerns.     Sincerely,     Rey Tello MD

## 2017-08-30 NOTE — PROGRESS NOTES
Otolaryngology Adult Consultation    Patient: Ada Welch   : 1970     Patient presents with:  Consult:   Chief Complaint   Patient presents with     Consult     chronic sinusitis        Referring Provider: Ellyn Rivera   Consulting Physician:  Zoe Barahona MD       Assessment/Plan: ASSESSMENT AND PLAN:  Ada Welch has a very complex past medical history including a significant history of sinus disease.  I would like to follow her for a while to get to know her sinus issues.  I did examine her today and found purulent secretions.  I took a culture.  We will start her on doxycycline and give her a three week course.  It is unclear to me at this point in time whether or not further surgical therapy would be helpful.  She did have a CT scan that I reviewed that shows patchy sinus disease.  She also has a complex immunologic history.  She is concerned that even though her laboratory workup was fairly unremarkable that she may have immunodeficiency.  We may have her meet with Dr. Mendoza from [facility] for further evaluation as well.  She is comfortable with this plan.  I will see her back in several weeks after she completes a course of doxycycline.  We may consider additional imaging at that time to determine the degree of obstructive pathology in her sinuses.          HPI: Ada Welch is a 46 year old female seen today in the Otolaryngology Clinic in consultation from Dr. Rivera for a history of chronic rhinosinusitis and Tom-Danlos syndrome.  She has been following with Dr. Bain but has moved to the [facility] and is now transitioning her care.  She has been here since .  Initially, she was evaluated and treated at [place].  She had a septoplasty for a nasal injury.  Nothing was done with her sinuses, and she was having some sinus issues at that time.  She saw Dr. Bain and was diagnosed with recurrent acute sinusitis, underwent endoscopic sinus surgery, and has  been doing somewhat better but continues to be symptomatic.  She had surgery in December of 2016.  She has not been on any antibiotics since that time but she feels like in the last three weeks she has developed increasing sinus congestion.  She worries that she may have a sinus infection.  She has many complications related to her Tom-Danlos syndrome.  She has chronic pain.  I did review her past medical history.  She is on several medications and is taking Zyrtec and Flonase specifically for her nose.  She has a lymph node that she would like me to follow.  It is an enlarged lymph node in the neck that has undergone fine needle aspiration and shows reactivity.  She states that it occasionally swells and is very uncomfortable.  She has undergone an immune system workup which was relatively normal.  I reviewed those results today.  I do believe she has also had an allergy workup and multiple mild reactions were identified to trees, ragweed and dust mite.         Current Outpatient Prescriptions on File Prior to Visit:  traMADol (ULTRAM) 50 MG tablet Take 1 tablet (50 mg) by mouth every 6 hours as needed for pain maximum 6 tablet(s) per day   metoclopramide (REGLAN) 5 MG tablet Take 1 tablet (5 mg) by mouth every 6 hours as needed   rizatriptan (MAXALT-MLT) 5 MG ODT tab Take 1-2 tablets (5-10 mg) by mouth at onset of headache for migraine (max 30 mg in 24 hours)   buprenorphine (BUTRANS) 10 MCG/HR WK patch Place 1 patch onto the skin every 7 days   amitriptyline (ELAVIL) 10 MG tablet Take 1 tablet (10 mg) by mouth At Bedtime (Patient taking differently: Take 10 mg by mouth At Bedtime Take 2 tablets)   SUMAtriptan (IMITREX STATDOSE) 6 MG/0.5ML pen injector kit Inject 0.5 mLs (6 mg) Subcutaneous at onset of headache for migraine (may repeat once after 2 hrs) May repeat in 1 hour. Max 12 mg/24 hours.   methylPREDNISolone (MEDROL DOSEPAK) 4 MG tablet Follow package instructions   ondansetron (ZOFRAN ODT) 4 MG ODT tab  Take 1-2 tablets (4-8 mg) by mouth every 8 hours as needed for nausea   ondansetron (ZOFRAN-ODT) 4 MG ODT tab Take 1-2 tablets (4-8 mg) by mouth every 12 hours as needed for nausea   ketamine, KETALAR, 5%-gabapentin, NEURONTIN, 8%-lidocaine 2.5% 5%-8% GEL topical PLO cream Apply 1 g topically 3 times daily as needed   tiZANidine (ZANAFLEX) 4 MG tablet Take 2 tablets (8 mg) by mouth 3 times daily as needed for muscle spasms   levothyroxine (SYNTHROID) 25 mcg/mL SUSP 25 mcg every morning   omeprazole (PRILOSEC) 20 MG CR capsule 20 mg 2 times daily   ondansetron (ZOFRAN-ODT) 4 MG ODT tab 4 mg every 12 hours as needed   fluticasone-salmeterol (ADVAIR DISKUS) 250-50 MCG/DOSE diskus inhaler    cetirizine (ZYRTEC) 10 MG tablet    polyethylene glycol (MIRALAX/GLYCOLAX) powder    FLUTICASONE FUROATE NA Spray 50 mcg in nostril At Bedtime   Multiple Vitamins-Minerals (MULTIVITAMIN PO) Take by mouth daily   Probiotic Product (PROBIOTIC DAILY PO) Take by mouth 2 times daily     No current facility-administered medications on file prior to visit.        Allergies   Allergen Reactions     Bee Venom Anaphylaxis, Difficulty breathing, Palpitations, Shortness Of Breath and Visual Disturbance     Patient does carry Epi-Pen     Chicken-Derived Products (Egg) Diarrhea     Other reaction(s): Nausea  Patient will self monitor  Other reaction(s): GI intolerance     Gabapentin      Gluten Meal      Lactose Dermatitis and Diarrhea     Milk Protein Extract      Other reaction(s): GI intolerance     Topiramate      Wheat Diarrhea     Wheat Bran      Other reaction(s): Nausea  Patient will self monitor       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     Chronic sinusitis 00/2007     Diagnosed with chronic pansinusitis 2016     Chronic tonsillitis 1980    Had tonsils removed 02/1981, rheumatic fever     Gastroesophageal reflux disease 3/1/2017    I have large hiatal hernia     Hernia, abdominal 10/2016    Hiatal Hernia     Hiatal hernia 2016    Have adeline hiatel hernia     Hoarseness Unsure    It comes and goes     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     Migraines 1/1/2007    Unsure of exact date     Nasal polyps 2013    Were revoved 03/2017, benign     Pneumonia Unsure    Have had pneumonia several times     Swelling, mass, or lump in head and neck 08/2016    Lymph node has been growing for last year     Thyroid disease 01/01/2008         Review of Systems  [facility] ROS 8/16/2017   Constitutional Weight gain, Appetite change, Unexplained fatigue, Problems with sleep, Unexplained fever or night sweats   Neurology Dizzy spells, Numbness, Unexplained weakness, Headache   Eyes Double vision   Ears, Nose, Throat Ear pain, Ringing/noise in ears, Nasal congestion or drainage, Sore throat, Trouble swallowing, Hoarseness   Cardiopulmonary Breathing problems, Wheezing, Chest pain   Gastrointestinal/Genitourinary Heartburn/indigestion, Pain with urination, Constipation, Diarrhea   Musculoskeletal Sore or stiff joints, Swollen joints, Back pain, Neck pain, Swollen legs/feet   Allergy/Immunology Allergies or hay fever, Skin changes, Rash   Hematologic Bleeding problems, Easy bruising, Lymph node swelling   Endocrine Thirst, Heat or cold intolerance, Frequent urination   Skin Change in moles, Skin lesions        14 point ROS neg other than the symptoms noted above.    Physical Exam:    General Assessment   The patient is alert, oriented and in no acute distress. Wearing a c-collar  Head/Face/Scalp  Grossly normal.   Nose  External nose is straight, skin is normal. Intranasal exam (anterior rhinoscopy) reveals normal moist mucosa, turbinate tissue without edema, erythema or  crusting.  Septum mainly in the midline.    Mouth  Oral cavity shows healthy mucosa with out ulceration, masses or other lesions involving the tongue, palate, buccal mucosa, floor of mouth or gingiva.  Dentition in good repair.  Oropharynx with normal tonsils, posterior wall and base of tongue.  Neck  No significant adenopathy, thyroid or salivary gland abnormality.       Procedure:  Nasal endoscopy performed to examine the posterior nasal passages for pathology.    Verbal consent obtained. Topical anesthetic/decongestant solution applied per patient request.   A rigid endoscope was passed into each nasal cavity separately.  Findings are as follows:   Left middle meatus:  Normal healthy meatus, + purulence, no polyps.    Left posterior nasal cavity:  No masses, lesions or abnormal tissue.   Right middle meatus:  Normal healthy meatus, no purulence or polyps   Right posterior nasal cavity:  No masses, lesions or abnormal tissue   Nasopharynx:  Clear, no lesions, crusting or inflammation

## 2017-08-30 NOTE — MR AVS SNAPSHOT
After Visit Summary   2017    Ada Welch    MRN: [id number]           Patient Information     Date Of Birth          1970        Visit Information        Provider Department      2017 3:00 PM Zoe Barahona MD [facility]        Today's Diagnoses     Chronic pansinusitis    -  1      Care Instructions    Plan of care:  Doxycycline twice/day for 21 days  Follow up in 2-3 months  Clinic contact information:  1. To schedule an appointment call [phone], option 1  2. To talk to the Triage RN call [phone], option 3  3. If you need to speak to Ellyn or get a message to your doctor on a Friday, call the triage RN  4. EllynRN: [phone]  5. Surgery scheduling:      Susan Diaz: [phone]      Nikki Benedict: [phone]  6. Fax: [phone]  7. [phone]            Follow-ups after your visit        Your next 10 appointments already scheduled     Sep 06, 2017 11:20 AM CDT   DARIA Spine with Armani Leon PT   [facility] ([facility] [place])    [address] [place] [address]  [address]  [place] [address]   [phone]            Sep 12, 2017  9:05 AM CDT   (Arrive by 8:50 AM)   New Patient Visit with Jerad Hebert MD   [facility] ([facility])    [address] [place] [id number] [phone]            Sep 13, 2017 11:20 AM CDT   DARIA Spine with Armani Leon PT   [facility] ([facility] [place])    [address] [place] [address]  [place] [address]   [phone]            Sep 20, 2017 10:40 AM CDT   DARIA Spine with Armani Leon PT   [facility] ([facility] [place])    [address] [place] [address] [address]  [place] [address]   [phone]            Sep 21, 2017 11:25 AM CDT   (Arrive by 11:10 AM)   Return Visit with Ellyn  Denise Rivera MD   [facility] ([facility])    [address] [place] [address]  4[address] [facility] [id number] [phone]            Sep 21, 2017  2:00 PM CDT   (Arrive by 1:45 PM)   Return Visit with FIDELINA Ridley CNP   [facility] ([facility])    [address] [place] [id number] [phone]            Sep 26, 2017 12:30 PM CDT   (Arrive by 12:15 PM)   NEW BLEEDING DISORDER with Cynthia Leach MD   [facility] ([facility])    [address] [place] [id number] [phone]            Sep 27, 2017 11:20 AM CDT   DARIA Spine with Armani Loen PT   [facility] ([facility] [place])    [address] [place] [address]  [address]  [place] [id number] [phone]            Oct 04, 2017  8:00 AM CDT   (Arrive by 7:45 AM)   New Patient Visit with Seth Galeano MD   [facility] ([facility])    [address] [place] [id number] [phone]            Oct 05, 2017  4:00 PM CDT   (Arrive by 3:45 PM)   NEW ARRHYTHMIA with Kirk Russell MD   [facility] ([facility])    [address] [place] [id number] [phone]              Future tests that were ordered for you today     Open Future Orders        Priority Expected Expires Ordered    Echocardiogram Complete Routine  8/30/2018 8/30/2017    SLEEP EVALUATION & MANAGEMENT REFERRAL - ADULT Routine  8/30/2018 8/30/2017            Who to contact     Please call your clinic at [phone] to:    Ask questions about your health    Make or cancel appointments    Discuss your medicines    Learn about your test results    Speak to your doctor   If you have compliments or concerns about an experience at your clinic, or if  "you wish to file a complaint, please contact [facility] Patient Relations at [phone] or email us at [email or web address]         Additional Information About Your Visit        [facility] Information     [facility] gives you secure access to your electronic health record. If you see a primary care provider, you can also send messages to your care team and make appointments. If you have questions, please call your primary care clinic.  If you do not have a primary care provider, please call [phone] and they will assist you.      [facility] is an electronic gateway that provides easy, online access to your medical records. With [facility], you can request a clinic appointment, read your test results, renew a prescription or communicate with your care team.     To access your existing account, please contact your [facility] or call [phone] for assistance.        Care EveryWhere ID     This is your Care EveryWhere ID. This could be used by other organizations to access your [facility] medical records  [id number]        Your Vitals Were     Height BMI (Body Mass Index)                1.676 m (5' 6\") 32.93 kg/m2           Blood Pressure from Last 3 Encounters:   08/30/17 (!) 145/106   08/30/17 (!) 148/94   08/14/17 129/90    Weight from Last 3 Encounters:   08/30/17 92.5 kg (204 lb)   08/30/17 92.5 kg (204 lb)   08/30/17 92.5 kg (204 lb)              We Performed the Following     Gram stain     Sinus Culture Aerobic Bacterial          Today's Medication Changes          These changes are accurate as of: 8/30/17  4:07 PM.  If you have any questions, ask your nurse or doctor.               Start taking these medicines.        Dose/Directions    COMPRESSION STOCKINGS   Used for:  Orthostatic hypotension   Started by:  Rey Tello MD        Dose:  1 each   1 each daily 20-30 mmHg Thigh Length measure and fit, color and style per patient " preference. Doshar n jose per need.   Quantity:  3 each   Refills:  3       doxycycline 100 MG capsule   Commonly known as:  VIBRAMYCIN   Used for:  Chronic pansinusitis   Started by:  Zoe Barahona MD        Dose:  100 mg   Take 1 capsule (100 mg) by mouth 2 times daily for 21 days   Quantity:  42 capsule   Refills:  0         These medicines have changed or have updated prescriptions.        Dose/Directions    amitriptyline 10 MG tablet   Commonly known as:  ELAVIL   This may have changed:  additional instructions   Used for:  Chronic migraine without aura without status migrainosus, not intractable        Dose:  10 mg   Take 1 tablet (10 mg) by mouth At Bedtime   Quantity:  60 tablet   Refills:  1            Where to get your medicines      These medications were sent to [facility] [address] [facility] [address]  [address] [place] [address] [address], [facility] [address]    Hours:  TRANSPLANT PHONE NUMBER [phone] Phone:  [phone]     doxycycline 100 MG capsule         Some of these will need a paper prescription and others can be bought over the counter.  Ask your nurse if you have questions.     Bring a paper prescription for each of these medications     COMPRESSION STOCKINGS                Primary Care Provider Office Phone # Fax #    Ellyn Rivera [phone]       [address] [place] [address]  [facility] [address]        Equal Access to Services     CHRISTINA GUY AH: Ashutosh romoo Soomaali, waaxda luqadaha, qaybta kaalmada adeegyada, odette whitehead haystanley tang. So [facility] [phone].    ATENCIÓN: Si habla español, tiene a sweeney disposición servicios gratuitos de asistencia lingüística. Llame al [phone].    We comply with applicable federal civil rights laws and [place] laws. We do not discriminate on the basis of race, color, national origin, age, disability sex, sexual orientation or gender identity.            Thank you!      Thank you for choosing [facility] E[facility]  for your care. Our goal is always to provide you with excellent care. Hearing back from our patients is one way we can continue to improve our services. Please take a few minutes to complete the written survey that you may receive in the mail after your visit with us. Thank you!             Your Updated Medication List - Protect others around you: Learn how to safely use, store and throw away your medicines at www.disposemymeds.org.          This list is accurate as of: 8/30/17  4:07 PM.  Always use your most recent med list.                   Brand Name Dispense Instructions for use Diagnosis    acetaminophen-codeine 300-30 MG per tablet    TYLENOL #3          ADVAIR DISKUS 250-50 MCG/DOSE diskus inhaler   Generic drug:  fluticasone-salmeterol           albuterol 108 (90 BASE) MCG/ACT Inhaler    PROAIR HFA/PROVENTIL HFA/VENTOLIN HFA     Inhale 2 puffs into the lungs        amitriptyline 10 MG tablet    ELAVIL    60 tablet    Take 1 tablet (10 mg) by mouth At Bedtime    Chronic migraine without aura without status migrainosus, not intractable       buprenorphine 10 MCG/HR WK patch    BUTRANS    4 patch    Place 1 patch onto the skin every 7 days    Postlaminectomy syndrome of lumbar region       cetirizine 10 MG tablet    zyrTEC          * citalopram 40 MG tablet    celeXA     Take 40 mg by mouth        * citalopram 20 MG tablet    celeXA     Take 20 mg by mouth        COMPRESSION STOCKINGS     3 each    1 each daily 20-30 mmHg Thigh Length measure and fit, color and style per patient preference. Doshar n jose per need.    Orthostatic hypotension       cyclobenzaprine 10 MG tablet    FLEXERIL     Take 10 mg by mouth        diazepam 5 MG tablet    VALIUM     Take 5 mg by mouth        doxycycline 100 MG capsule    VIBRAMYCIN    42 capsule    Take 1 capsule (100 mg) by mouth 2 times daily for 21 days    Chronic pansinusitis       EPINEPHrine 0.3 MG/0.3ML injection  2-pack    EPIPEN/ADRENACLICK/or ANY BX GENERIC EQUIV     Inject 0.3 mg into the muscle        etodolac 400 MG tablet    LODINE     Take 400 mg by mouth        fentaNYL 25 mcg/hr 72 hr patch    DURAGESIC     25 mcg        fluocinonide 0.05 % cream    LIDEX          fluticasone 50 MCG/ACT spray    FLONASE     2 sprays        FLUTICASONE FUROATE NA      Spray 50 mcg in nostril At Bedtime        furosemide 40 MG tablet    LASIX     Take 40 mg by mouth        HYDROmorphone 4 MG tablet    DILAUDID     Take 4 mg by mouth        ketamine (KETALAR) 5%-gabapentin (NEURONTIN) 8%-lidocaine 2.5% 5%-8% Gel topical PLO cream     30 g    Apply 1 g topically 3 times daily as needed    Postlaminectomy syndrome of lumbosacral region, Postlaminectomy syndrome of lumbar region       lamoTRIgine 25 MG tablet    LaMICtal     Take 25 mg by mouth        levothyroxine 25 mcg/mL Susp    SYNTHROID     25 mcg every morning        Melatonin Gummies 2.5 MG Chew           meloxicam 15 MG tablet    MOBIC     Take 15 mg by mouth        methylPREDNISolone 4 MG tablet    MEDROL DOSEPAK    21 tablet    Follow package instructions    Intractable headache, unspecified chronicity pattern, unspecified headache type       metoclopramide 5 MG tablet    REGLAN    20 tablet    Take 1 tablet (5 mg) by mouth every 6 hours as needed    Intractable chronic migraine without aura and with status migrainosus       metoprolol 100 MG 24 hr tablet    TOPROL-XL     Take 100 mg by mouth        midodrine 5 MG tablet    PROAMATINE     Take 5 mg by mouth        MULTI-VITAMINS Tabs           MULTIVITAMIN PO      Take by mouth daily        * thyroid 15 MG Tabs tablet      Take 15 mg by mouth        * thyroid 60 MG tablet    ARMOUR     Take 60 mg by mouth        * NATURE-THROID 81.25 MG Tabs   Generic drug:  Thyroid      Take 1 tablet by mouth        omeprazole 20 MG CR capsule    priLOSEC     20 mg 2 times daily        * ondansetron 4 MG ODT tab    ZOFRAN-ODT     4 mg every 12  hours as needed        * ondansetron 4 MG ODT tab    ZOFRAN-ODT    60 tablet    Take 1-2 tablets (4-8 mg) by mouth every 12 hours as needed for nausea    Migraine without status migrainosus, not intractable, unspecified migraine type       * ondansetron 4 MG ODT tab    ZOFRAN ODT    20 tablet    Take 1-2 tablets (4-8 mg) by mouth every 8 hours as needed for nausea    Intractable headache, unspecified chronicity pattern, unspecified headache type       ondansetron 4 MG tablet    ZOFRAN     Take 4 mg by mouth        phentermine 37.5 MG tablet    ADIPEX-P     Take 37.5 mg by mouth        polyethylene glycol powder    MIRALAX/GLYCOLAX          PROBIOTIC DAILY PO      Take by mouth 2 times daily        rizatriptan 5 MG ODT tab    MAXALT-MLT    18 tablet    Take 1-2 tablets (5-10 mg) by mouth at onset of headache for migraine (max 30 mg in 24 hours)    Migraine without status migrainosus, not intractable, unspecified migraine type       senna 8.6 MG tablet    SENOKOT     Take 17.2-34.4 mg by mouth        SUMAtriptan 6 MG/0.5ML pen injector kit    IMITREX STATDOSE    2 kit    Inject 0.5 mLs (6 mg) Subcutaneous at onset of headache for migraine (may repeat once after 2 hrs) May repeat in 1 hour. Max 12 mg/24 hours.    Chronic migraine without aura without status migrainosus, not intractable       tiZANidine 4 MG tablet    ZANAFLEX    90 tablet    Take 2 tablets (8 mg) by mouth 3 times daily as needed for muscle spasms    Post laminectomy syndrome       traMADol 50 MG tablet    ULTRAM    30 tablet    Take 1 tablet (50 mg) by mouth every 6 hours as needed for pain maximum 6 tablet(s) per day    EDS (Tom-Danlos syndrome)       vitamin D 06312 UNIT capsule    ERGOCALCIFEROL          zonisamide 100 MG capsule    ZONEGRAN     Take 100 mg by mouth        * Notice:  This list has 8 medication(s) that are the same as other medications prescribed for you. Read the directions carefully, and ask your doctor or other care provider to  review them with you.

## 2017-08-30 NOTE — MR AVS SNAPSHOT
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 Please do not hesitate to call with any questions or concerns.    Carla Hou, RN  Electrophysiology Nurse Clinician  [facility]  During business hours call:  [phone]  After business hours please call: [phone]- select option #4 and ask for job code [phone].            Follow-ups after your visit        Follow-up notes from your care team     Return With MD appointments .      Your next 10 appointments already scheduled     Aug 30, 2017  3:00 PM CDT   (Arrive by 2:45 PM)   New Patient Visit with Zoe Barahona MD   [facility] ([facility] [place])    [address] [place] [address]  [address] [facility] [address]   [phone]            Sep 06, 2017 11:20 AM CDT   DARIA Spine with Armani Leon PT   [facility] [place] [facility] ([facility] [place])    [address] [place] [address]  [address]  [place] [address]   [phone]            Sep 12, 2017  9:05 AM CDT   (Arrive by 8:50 AM)   New Patient Visit with Jerad Hebert MD   [facility] ([facility] [place])    [address]  2nd Floor  [facility] [address]   [phone]            Sep 13, 2017 11:20 AM CDT   DARIA Spine with Armani Leon PT   [facility] [place] [facility] ([facility] [place])    [address] [place] [address] [address]  [place] [address]   [phone]            Sep 20, 2017 10:40 AM CDT   DARIA Spine with Armani Leon PT   [facility] [place] [facility]  ([facility] [place])    [address] [place] [address]  [address]  [place] [address]   [phone]            Sep 21, 2017 11:25 AM CDT   (Arrive by 11:10 AM)   Return Visit with Ellyn Rivera MD   [facility] ([facility])    [address] [place] [address]  [address] [facility] [address]   [phone]            Sep 21, 2017  2:00 PM CDT   (Arrive by 1:45 PM)   Return Visit with FIDELINA Ridley CNP   [facility] ([facility])    [address] [place] [address]  [address] [facility] [address]   [phone]            Sep 26, 2017 12:30 PM CDT   (Arrive by 12:15 PM)   NEW BLEEDING DISORDER with Cynthia Leach MD   [facility] Bleeding [facility] Clotting ([facility])    [address] [place] [address]  3[address] [facility] [address]   [phone]            Sep 27, 2017 11:20 AM CDT   DARIA Spine with Armani Leon, PT   [facility] [place] [facility] ([facility] [place])    [address] [place] [address]  [address]  [place] [address]   [phone]            Oct 04, 2017  8:00 AM CDT   (Arrive by 7:45 AM)   New Patient Visit with Seth Galeano MD   [facility] ([facility])    [address] [place] [address] [phone]              Future tests that were ordered for you today     Open Future Orders        Priority Expected Expires Ordered    Echocardiogram Complete Routine  8/30/2018 8/30/2017    SLEEP EVALUATION & MANAGEMENT REFERRAL - ADULT Routine  8/30/2018 8/30/2017            Who to contact     If you have questions or need follow up information about today's clinic visit or your schedule please contact [facility] directly at [phone].  Normal or non-critical lab and imaging results will be communicated to you by MyChart, letter or phone within 4 business days after the clinic has received the results. If you do  not hear from us within 7 days, please contact the clinic through [employer] or phone. If you have a critical or abnormal lab result, we will notify you by phone as soon as possible.  Submit refill requests through [employer] or call your pharmacy and they will forward the refill request to us. Please allow 3 business days for your refill to be completed.          Additional Information About Your Visit        [employer] Information     [employer] gives you secure access to your electronic health record. If you see a primary care provider, you can also send messages to your care team and make appointments. If you have questions, please call your primary care clinic.  If you do not have a primary care provider, please call [phone] and they will assist you.        Care EveryWhere ID     This is your Care EveryWhere ID. This could be used by other organizations to access your [facility] medical records  [id number]         Blood Pressure from Last 3 Encounters:   08/30/17 (!) 145/106   08/30/17 (!) 148/94   08/14/17 129/90    Weight from Last 3 Encounters:   08/30/17 92.5 kg (204 lb)   08/30/17 92.5 kg (204 lb)   08/07/17 88.5 kg (195 lb)              We Performed the Following     INTERR DEVICE EVAL IN PERSON, LOOP RECORDER          Today's Medication Changes          These changes are accurate as of: 8/30/17  2:11 PM.  If you have any questions, ask your nurse or doctor.               Start taking these medicines.        Dose/Directions    COMPRESSION STOCKINGS   Used for:  Orthostatic hypotension   Started by:  Rey Tello MD        Dose:  1 each   1 each daily 20-30 mmHg Thigh Length measure and fit, color and style per patient preference. Doff n jose per need.   Quantity:  3 each   Refills:  3         These medicines have changed or have updated prescriptions.        Dose/Directions    amitriptyline 10 MG tablet   Commonly known as:  ELAVIL   This may have changed:  additional instructions   Used for:  Chronic  migraine without aura without status migrainosus, not intractable        Dose:  10 mg   Take 1 tablet (10 mg) by mouth At Bedtime   Quantity:  60 tablet   Refills:  1            Where to get your medicines      Some of these will need a paper prescription and others can be bought over the counter.  Ask your nurse if you have questions.     Bring a paper prescription for each of these medications     COMPRESSION STOCKINGS                Primary Care Provider Office Phone # Fax #    [place] [phone]       [address] [place] [address] [address] [place] [address]        Equal Access to Services     [facility]: Hadii aad ku hadasho Soomaali, waaxda luqadaha, qaybta kaalmada adeegyada, waxbrian dykes . So [facility] [phone].    ATENCIÓN: Si habla español, tiene a sweeney disposición servicios gratuitos de asistencia lingüística. [facility] [phone].    We comply with applicable federal civil rights laws and [place] laws. We do not discriminate on the basis of race, color, national origin, age, disability sex, sexual orientation or gender identity.            Thank you!     Thank you for choosing [facility]  for your care. Our goal is always to provide you with excellent care. Hearing back from our patients is one way we can continue to improve our services. Please take a few minutes to complete the written survey that you may receive in the mail after your visit with us. Thank you!             Your Updated Medication List - Protect others around you: Learn how to safely use, store and throw away your medicines at www.disposemymeds.org.          This list is accurate as of: 8/30/17  2:11 PM.  Always use your most recent med list.                   Brand Name Dispense Instructions for use Diagnosis    acetaminophen-codeine 300-30 MG per tablet    TYLENOL #3          ADVAIR DISKUS 250-50 MCG/DOSE diskus inhaler   Generic drug:  fluticasone-salmeterol            albuterol 108 (90 BASE) MCG/ACT Inhaler    PROAIR HFA/PROVENTIL HFA/VENTOLIN HFA     Inhale 2 puffs into the lungs        amitriptyline 10 MG tablet    ELAVIL    60 tablet    Take 1 tablet (10 mg) by mouth At Bedtime    Chronic migraine without aura without status migrainosus, not intractable       buprenorphine 10 MCG/HR WK patch    BUTRANS    4 patch    Place 1 patch onto the skin every 7 days    Postlaminectomy syndrome of lumbar region       cetirizine 10 MG tablet    zyrTEC          * citalopram 40 MG tablet    celeXA     Take 40 mg by mouth        * citalopram 20 MG tablet    celeXA     Take 20 mg by mouth        COMPRESSION STOCKINGS     3 each    1 each daily 20-30 mmHg Thigh Length measure and fit, color and style per patient preference. Doshar n jose per need.    Orthostatic hypotension       cyclobenzaprine 10 MG tablet    FLEXERIL     Take 10 mg by mouth        diazepam 5 MG tablet    VALIUM     Take 5 mg by mouth        EPINEPHrine 0.3 MG/0.3ML injection 2-pack    EPIPEN/ADRENACLICK/or ANY BX GENERIC EQUIV     Inject 0.3 mg into the muscle        etodolac 400 MG tablet    LODINE     Take 400 mg by mouth        fentaNYL 25 mcg/hr 72 hr patch    DURAGESIC     25 mcg        fluocinonide 0.05 % cream    LIDEX          fluticasone 50 MCG/ACT spray    FLONASE     2 sprays        FLUTICASONE FUROATE NA      Spray 50 mcg in nostril At Bedtime        furosemide 40 MG tablet    LASIX     Take 40 mg by mouth        HYDROmorphone 4 MG tablet    DILAUDID     Take 4 mg by mouth        ketamine (KETALAR) 5%-gabapentin (NEURONTIN) 8%-lidocaine 2.5% 5%-8% Gel topical PLO cream     30 g    Apply 1 g topically 3 times daily as needed    Postlaminectomy syndrome of lumbosacral region, Postlaminectomy syndrome of lumbar region       lamoTRIgine 25 MG tablet    LaMICtal     Take 25 mg by mouth        levothyroxine 25 mcg/mL Susp    SYNTHROID     25 mcg every morning        Melatonin Gummies 2.5 MG Chew           meloxicam 15  MG tablet    MOBIC     Take 15 mg by mouth        methylPREDNISolone 4 MG tablet    MEDROL DOSEPAK    21 tablet    Follow package instructions    Intractable headache, unspecified chronicity pattern, unspecified headache type       metoclopramide 5 MG tablet    REGLAN    20 tablet    Take 1 tablet (5 mg) by mouth every 6 hours as needed    Intractable chronic migraine without aura and with status migrainosus       metoprolol 100 MG 24 hr tablet    TOPROL-XL     Take 100 mg by mouth        midodrine 5 MG tablet    PROAMATINE     Take 5 mg by mouth        MULTI-VITAMINS Tabs           MULTIVITAMIN PO      Take by mouth daily        * thyroid 15 MG Tabs tablet      Take 15 mg by mouth        * thyroid 60 MG tablet    ARMOUR     Take 60 mg by mouth        * NATURE-THROID 81.25 MG Tabs   Generic drug:  Thyroid      Take 1 tablet by mouth        omeprazole 20 MG CR capsule    priLOSEC     20 mg 2 times daily        * ondansetron 4 MG ODT tab    ZOFRAN-ODT     4 mg every 12 hours as needed        * ondansetron 4 MG ODT tab    ZOFRAN-ODT    60 tablet    Take 1-2 tablets (4-8 mg) by mouth every 12 hours as needed for nausea    Migraine without status migrainosus, not intractable, unspecified migraine type       * ondansetron 4 MG ODT tab    ZOFRAN ODT    20 tablet    Take 1-2 tablets (4-8 mg) by mouth every 8 hours as needed for nausea    Intractable headache, unspecified chronicity pattern, unspecified headache type       ondansetron 4 MG tablet    ZOFRAN     Take 4 mg by mouth        phentermine 37.5 MG tablet    ADIPEX-P     Take 37.5 mg by mouth        polyethylene glycol powder    MIRALAX/GLYCOLAX          PROBIOTIC DAILY PO      Take by mouth 2 times daily        rizatriptan 5 MG ODT tab    MAXALT-MLT    18 tablet    Take 1-2 tablets (5-10 mg) by mouth at onset of headache for migraine (max 30 mg in 24 hours)    Migraine without status migrainosus, not intractable, unspecified migraine type       senna 8.6 MG tablet     SENOKOT     Take 17.2-34.4 mg by mouth        SUMAtriptan 6 MG/0.5ML pen injector kit    IMITREX STATDOSE    2 kit    Inject 0.5 mLs (6 mg) Subcutaneous at onset of headache for migraine (may repeat once after 2 hrs) May repeat in 1 hour. Max 12 mg/24 hours.    Chronic migraine without aura without status migrainosus, not intractable       tiZANidine 4 MG tablet    ZANAFLEX    90 tablet    Take 2 tablets (8 mg) by mouth 3 times daily as needed for muscle spasms    Post laminectomy syndrome       traMADol 50 MG tablet    ULTRAM    30 tablet    Take 1 tablet (50 mg) by mouth every 6 hours as needed for pain maximum 6 tablet(s) per day    EDS (Tom-Danlos syndrome)       vitamin D 30710 UNIT capsule    ERGOCALCIFEROL          zonisamide 100 MG capsule    ZONEGRAN     Take 100 mg by mouth        * Notice:  This list has 8 medication(s) that are the same as other medications prescribed for you. Read the directions carefully, and ask your doctor or other care provider to review them with you.

## 2017-08-30 NOTE — PATIENT INSTRUCTIONS
Plan of care:  Doxycycline twice/day for 21 days  Follow up in 2-3 months  Clinic contact information:  1. To schedule an appointment call [phone], option 1  2. To talk to the Triage RN call [phone], option 3  3. If you need to speak to Ellyn or get a message to your doctor on a Friday, call the triage RN  4. EllynRN: [phone]  5. Surgery scheduling:      Susan Diaz: [phone]      Nikki Benedict: [phone]  6. Fax: [phone]  7. [phone]

## 2017-08-30 NOTE — LETTER
8/30/2017     RE: Ada Welch  [address] [place] [address]     Dear Colleague,    Thank you for referring your patient, Ada Welch, to the [facility]. Please see a copy of my visit note below.    ? #[id number]    Again, thank you for allowing me to participate in the care of your patient.      Sincerely,    Rey Tello MD

## 2017-08-30 NOTE — PATIENT INSTRUCTIONS
It was a pleasure to see you in clinic today. Please do not hesitate to call with any questions or concerns.    Carla Hou, RN  Electrophysiology Nurse Clinician  [facility]  During business hours call:  [phone]  After business hours please call: [phone]- select option #4 and ask for job code [phone].

## 2017-08-30 NOTE — MR AVS SNAPSHOT
After Visit Summary   8/30/2017    Ada Welch    MRN: [id number]           Patient Information     Date Of Birth          1970        Visit Information        Provider Department      8/30/2017 11:00 AM Wade Singh MD [facility] and [facility]        Today's Diagnoses     Nocturia    -  1    Urinary urgency        Slow transit constipation           Follow-ups after your visit        Follow-up notes from your care team     Return in about 3 months (around 11/30/2017).      Your next 10 appointments already scheduled     Aug 30, 2017  1:30 PM CDT   (Arrive by 1:15 PM)   New Patient Visit with Rey Tello MD   [facility] ([facility])    [address]  3rd Floor  [facility] [address] [phone]            Aug 30, 2017  3:00 PM CDT   (Arrive by 2:45 PM)   New Patient Visit with Zoe Barahona MD   [facility] Nose [facility] Throat ([facility])    [address]  4th Floor  [facility] [address] [phone]            Sep 06, 2017 11:20 AM CDT   DARIA Spine with Armani Leon PT   [facility] [place] [facility] ([facility] [place])    [address] [place] [address]  [address]  [place] [address]   [phone]            Sep 12, 2017  9:05 AM CDT   (Arrive by 8:50 AM)   New Patient Visit with Jerad Hebert MD   [facility] ([facility])    [address]  2nd Floor  [facility] [address]   [phone]            Sep 13, 2017 11:20 AM CDT   DARIA Spine with Armani Leon PT   [facility] [place] [facility] ([facility] [place])    [address] [place] [address] [address]  [place] [address]   [phone]            Sep 20, 2017 10:40 AM CDT   DARIA Spine with Armani Leon PT   [facility] [place] [facility] ([facility] [place])    [facility]  [facility]  [address]  [place] [address]   [phone]            Sep 21, 2017 11:25 AM CDT   (Arrive by 11:10 AM)   Return Visit with Ellyn Rivera MD   [facility] ([facility])    [address] [place] [address]  4th [facility] [address]   [phone]            Sep 21, 2017  2:00 PM CDT   (Arrive by 1:45 PM)   Return Visit with FIDELINA Ridley CNP   [facility] Neurology ([facility])    [address] [place] [address]  3rd [facility] [address]   [phone]            Sep 26, 2017 12:30 PM CDT   (Arrive by 12:15 PM)   NEW BLEEDING DISORDER with Cynthia Leach MD   [facility] Bleeding and Clotting ([facility])    [address] [place] [address]  3rd [facility] [address]   [phone]            Sep 27, 2017 11:20 AM CDT   DARIA Spine with Armani Leon, PT   [place] [facility] [place] [facility] ([facility] [place])    [address] [place] [address]  [address]  [place] [address]   [phone]              Who to contact     Please call your clinic at [phone] to:    Ask questions about your health    Make or cancel appointments    Discuss your medicines    Learn about your test results    Speak to your doctor   If you have compliments or concerns about an experience at your clinic, or if you wish to file a complaint, please contact [facility] Patient Relations at [phone] or email us at [email or web address]         Additional Information About Your Visit        eFlixhart Information     e[employer]hart gives you secure access to your electronic health record. If you see a primary care provider, you can also send messages to your care team and make appointments. If you have questions, please call your primary care clinic.  If you do not have a primary care provider, please call [phone] and they will assist you.      e[employer] is an  "electronic gateway that provides easy, online access to your medical records. With [employer], you can request a clinic appointment, read your test results, renew a prescription or communicate with your care team.     To access your existing account, please contact your [facility] or call [phone] for assistance.        Care EveryWhere ID     This is your Care EveryWhere ID. This could be used by other organizations to access your [facility] medical records  [id number]        Your Vitals Were     Pulse Height BMI (Body Mass Index)             106 1.689 m (5' 6.5\") 32.43 kg/m2          Blood Pressure from Last 3 Encounters:   08/30/17 (!) 148/94   08/14/17 129/90   08/07/17 117/76    Weight from Last 3 Encounters:   08/30/17 92.5 kg (204 lb)   08/07/17 88.5 kg (195 lb)   07/27/17 88.7 kg (195 lb 8 oz)              Today, you had the following     No orders found for display       Primary Care Provider Office Phone # Fax #    Ellyn Rivera [phone]       [address] [place] [address] [address]  [place] [address]        Equal Access to Services     LETA GUY : Hadii kia romoo Sonegrito, waaxda luqadaha, qaybta kaalmada adealanyada, odette tang. So [facility] [phone].    ATENCIÓN: Si habla español, tiene a sweeney disposición servicios gratuitos de asistencia lingüística. [facility] [phone].    We comply with applicable federal civil rights laws and [place] laws. We do not discriminate on the basis of race, color, national origin, age, disability sex, sexual orientation or gender identity.            Thank you!     Thank you for choosing [facility] UROLOGY AND [facility] PROST[facility] UROLOGIC CANCERS  for your care. Our goal is always to provide you with excellent care. Hearing back from our patients is one way we can continue to improve our services. Please take a few minutes to complete the written survey that you may receive in the mail after " your visit with us. Thank you!             Your Updated Medication List - Protect others around you: Learn how to safely use, store and throw away your medicines at www.disposemymeds.org.          This list is accurate as of: 8/30/17 11:34 AM.  Always use your most recent med list.                   Brand Name Dispense Instructions for use Diagnosis    acetaminophen-codeine 300-30 MG per tablet    TYLENOL #3          ADVAIR DISKUS 250-50 MCG/DOSE diskus inhaler   Generic drug:  fluticasone-salmeterol           albuterol 108 (90 BASE) MCG/ACT Inhaler    PROAIR HFA/PROVENTIL HFA/VENTOLIN HFA     Inhale 2 puffs into the lungs        amitriptyline 10 MG tablet    ELAVIL    60 tablet    Take 1 tablet (10 mg) by mouth At Bedtime    Chronic migraine without aura without status migrainosus, not intractable       buprenorphine 10 MCG/HR WK patch    BUTRANS    4 patch    Place 1 patch onto the skin every 7 days    Postlaminectomy syndrome of lumbar region       cetirizine 10 MG tablet    zyrTEC          * citalopram 40 MG tablet    celeXA     Take 40 mg by mouth        * citalopram 20 MG tablet    celeXA     Take 20 mg by mouth        cyclobenzaprine 10 MG tablet    FLEXERIL     Take 10 mg by mouth        diazepam 5 MG tablet    VALIUM     Take 5 mg by mouth        EPINEPHrine 0.3 MG/0.3ML injection 2-pack    EPIPEN/ADRENACLICK/or ANY BX GENERIC EQUIV     Inject 0.3 mg into the muscle        etodolac 400 MG tablet    LODINE     Take 400 mg by mouth        fentaNYL 25 mcg/hr 72 hr patch    DURAGESIC     25 mcg        fluocinonide 0.05 % cream    LIDEX          fluticasone 50 MCG/ACT spray    FLONASE     2 sprays        FLUTICASONE FUROATE NA      Spray 50 mcg in nostril At Bedtime        furosemide 40 MG tablet    LASIX     Take 40 mg by mouth        HYDROmorphone 4 MG tablet    DILAUDID     Take 4 mg by mouth        ketamine (KETALAR) 5%-gabapentin (NEURONTIN) 8%-lidocaine 2.5% 5%-8% Gel topical PLO cream     30 g    Apply 1  g topically 3 times daily as needed    Postlaminectomy syndrome of lumbosacral region, Postlaminectomy syndrome of lumbar region       lamoTRIgine 25 MG tablet    LaMICtal     Take 25 mg by mouth        levothyroxine 25 mcg/mL Susp    SYNTHROID     25 mcg every morning        Melatonin Gummies 2.5 MG Chew           meloxicam 15 MG tablet    MOBIC     Take 15 mg by mouth        methylPREDNISolone 4 MG tablet    MEDROL DOSEPAK    21 tablet    Follow package instructions    Intractable headache, unspecified chronicity pattern, unspecified headache type       metoclopramide 5 MG tablet    REGLAN    20 tablet    Take 1 tablet (5 mg) by mouth every 6 hours as needed    Intractable chronic migraine without aura and with status migrainosus       metoprolol 100 MG 24 hr tablet    TOPROL-XL     Take 100 mg by mouth        midodrine 5 MG tablet    PROAMATINE     Take 5 mg by mouth        MULTI-VITAMINS Tabs           MULTIVITAMIN PO      Take by mouth daily        * thyroid 15 MG Tabs tablet      Take 15 mg by mouth        * thyroid 60 MG tablet    ARMOUR     Take 60 mg by mouth        * NATURE-THROID 81.25 MG Tabs   Generic drug:  Thyroid      Take 1 tablet by mouth        omeprazole 20 MG CR capsule    priLOSEC     20 mg 2 times daily        * ondansetron 4 MG ODT tab    ZOFRAN-ODT     4 mg every 12 hours as needed        * ondansetron 4 MG ODT tab    ZOFRAN-ODT    60 tablet    Take 1-2 tablets (4-8 mg) by mouth every 12 hours as needed for nausea    Migraine without status migrainosus, not intractable, unspecified migraine type       * ondansetron 4 MG ODT tab    ZOFRAN ODT    20 tablet    Take 1-2 tablets (4-8 mg) by mouth every 8 hours as needed for nausea    Intractable headache, unspecified chronicity pattern, unspecified headache type       ondansetron 4 MG tablet    ZOFRAN     Take 4 mg by mouth        phentermine 37.5 MG tablet    ADIPEX-P     Take 37.5 mg by mouth        polyethylene glycol powder     MIRALAX/GLYCOLAX          PROBIOTIC DAILY PO      Take by mouth 2 times daily        rizatriptan 5 MG ODT tab    MAXALT-MLT    18 tablet    Take 1-2 tablets (5-10 mg) by mouth at onset of headache for migraine (max 30 mg in 24 hours)    Migraine without status migrainosus, not intractable, unspecified migraine type       senna 8.6 MG tablet    SENOKOT     Take 17.2-34.4 mg by mouth        SUMAtriptan 6 MG/0.5ML pen injector kit    IMITREX STATDOSE    2 kit    Inject 0.5 mLs (6 mg) Subcutaneous at onset of headache for migraine (may repeat once after 2 hrs) May repeat in 1 hour. Max 12 mg/24 hours.    Chronic migraine without aura without status migrainosus, not intractable       tiZANidine 4 MG tablet    ZANAFLEX    90 tablet    Take 2 tablets (8 mg) by mouth 3 times daily as needed for muscle spasms    Post laminectomy syndrome       traMADol 50 MG tablet    ULTRAM    30 tablet    Take 1 tablet (50 mg) by mouth every 6 hours as needed for pain maximum 6 tablet(s) per day    EDS (Tom-Danlos syndrome)       vitamin D 36401 UNIT capsule    ERGOCALCIFEROL          zonisamide 100 MG capsule    ZONEGRAN     Take 100 mg by mouth        * Notice:  This list has 8 medication(s) that are the same as other medications prescribed for you. Read the directions carefully, and ask your doctor or other care provider to review them with you.

## 2017-08-30 NOTE — PROGRESS NOTES
"CC: Urinary Urgency    HPI:  Ada Welch is a 46 year old female with gastroparesis, Ehler's Danlos Syndrome, Mast cell disease,  Chiari malformation, dysautonomia who presents today for evaluation of urinary urgency with associated with incontinence.This problem has been going on since she underwent a spinal fusion in 2013  and has been getting worse. She states that her night time symptoms are significantly worse than daytime.  Worsened when she has constipation.  She denies any dysuria, nocturia, hematuria, pyuria, hesitency, intermittency, feeling of incomplete emptying or stones. She reports a previous history of recurrent UTIs in the past but no recent infections.   The patient has a history of gastroparesis and recently started on a bowel regimen. But still reports constipation. She also believes that she may have sleep apnea but has not been formally tested.     Obstetric Hx:  She is G5    Past Surgical History:   Procedure Laterality Date     AS REPAIR OF NASAL SEPTUM       TONSILLECTOMY       TUBAL LIGATION     -     Meds, Allergies, FHx and SHx reviewed per nurse's intake note.    ROS positive for chronic pain and what is mentioned in the HPI and negative for the remainder of a 12 point ROS.    PEx:   Blood pressure (!) 148/94, pulse 106, height 1.689 m (5' 6.5\"), weight 92.5 kg (204 lb), not currently breastfeeding.  5' 6.5\", Body mass index is 32.43 kg/(m^2)., 204 lbs 0 oz  Gen appearance:  Well groomed  HEENT:  EOMI, AT NC  Psych:  Normal Affect  Neuro:  A/O X 3  Skin:  Warm to touch  Resp:  No increased respiratory effort  Vasc:  RRR  lymph: Bilateral mild lower extremity edema   Musk:  Limited ROM in extremities due to extensive back and cervical disease. Patient has neck brace on due to Chiari malformation         Admission on 07/24/2017, Discharged on 07/27/2017   Component Date Value Ref Range Status     Sodium 07/24/2017 141  133 - 144 mmol/L Final     Potassium 07/24/2017 4.0  3.4 - 5.3 " mmol/L Final     Chloride 07/24/2017 110* 94 - 109 mmol/L Final     Carbon Dioxide 07/24/2017 27  20 - 32 mmol/L Final     Anion Gap 07/24/2017 5  3 - 14 mmol/L Final     Glucose 07/24/2017 88  70 - 99 mg/dL Final     Urea Nitrogen 07/24/2017 8  7 - 30 mg/dL Final     Creatinine 07/24/2017 0.85  0.52 - 1.04 mg/dL Final     GFR Estimate 07/24/2017 72  >60 mL/min/1.7m2 Final    Non  GFR Calc     GFR Estimate If Black 07/24/2017 87  >60 mL/min/1.7m2 Final    African American GFR Calc     Calcium 07/24/2017 8.9  8.5 - 10.1 mg/dL Final     Bilirubin Total 07/24/2017 0.6  0.2 - 1.3 mg/dL Final     Albumin 07/24/2017 4.1  3.4 - 5.0 g/dL Final     Protein Total 07/24/2017 7.4  6.8 - 8.8 g/dL Final     Alkaline Phosphatase 07/24/2017 148  40 - 150 U/L Final     ALT 07/24/2017 56* 0 - 50 U/L Final     AST 07/24/2017 36  0 - 45 U/L Final     WBC 07/24/2017 5.7  4.0 - 11.0 10e9/L Final     RBC Count 07/24/2017 4.41  3.8 - 5.2 10e12/L Final     Hemoglobin 07/24/2017 13.5  11.7 - 15.7 g/dL Final     Hematocrit 07/24/2017 39.5  35.0 - 47.0 % Final     MCV 07/24/2017 90  78 - 100 fl Final     MCH 07/24/2017 30.6  26.5 - 33.0 pg Final     MCHC 07/24/2017 34.2  31.5 - 36.5 g/dL Final     RDW 07/24/2017 12.6  10.0 - 15.0 % Final     Platelet Count 07/24/2017 238  150 - 450 10e9/L Final     Diff Method 07/24/2017 Automated Method   Final     % Neutrophils 07/24/2017 61.9  % Final     % Lymphocytes 07/24/2017 29.0  % Final     % Monocytes 07/24/2017 5.1  % Final     % Eosinophils 07/24/2017 4.0  % Final     % Basophils 07/24/2017 0.0  % Final     % Immature Granulocytes 07/24/2017 0.0  % Final     Nucleated RBCs 07/24/2017 0  0 /100 Final     Absolute Neutrophil 07/24/2017 3.5  1.6 - 8.3 10e9/L Final     Absolute Lymphocytes 07/24/2017 1.7  0.8 - 5.3 10e9/L Final     Absolute Monocytes 07/24/2017 0.3  0.0 - 1.3 10e9/L Final     Absolute Eosinophils 07/24/2017 0.2  0.0 - 0.7 10e9/L Final     Absolute Basophils 07/24/2017  0.0  0.0 - 0.2 10e9/L Final     Abs Immature Granulocytes 07/24/2017 0.0  0 - 0.4 10e9/L Final     Absolute Nucleated RBC 07/24/2017 0.0   Final     CRP Inflammation 07/24/2017 <2.9  0.0 - 8.0 mg/L Final     Sed Rate 07/24/2017 10  0 - 20 mm/h Final     INR 07/25/2017 1.01  0.86 - 1.14 Final     WBC CSF 07/26/2017 0  0 - 5 /uL Final     RBC CSF 07/26/2017 0  0 - 2 /uL Final     Tube Number 07/26/2017 4  # Final     Color CSF 07/26/2017 Colorless  CLRL Final     Appearance CSF 07/26/2017 Clear  CLER Final     Glucose CSF 07/26/2017 63  40 - 70 mg/dL Final    CSF glucose concentrations are about 60 percent of normal plasma glucose.     Protein Total CSF 07/26/2017 36  15 - 60 mg/dL Final     Glucose 07/26/2017 98  70 - 99 mg/dL Final       IMAGING:    ASSESSMENT and PLAN:  This is a 46 year old female with a complex medical history presenting with  night time urgency with incontinence.  We discussed etiology of her symptoms including bowel bladder dysfunction and possible sleep apnea based on reported history. . Different management options were discussed with the patient including observation, lifestyle modification, and anticholinergics .   The patient has elected to proceed with lifestyle modification   - Will order sleep study to evaluate for sleep apnea   - Due to her history of constipation would recommend a optimizing her bowel regimen including daily Miralax prior to initiation of anticholinergics.   -f/u in 3 months to reassess      Thank you for allowing me to participate in Ms. Welch's care.  I will keep you updated on her progress.    Patient examined with Dr. Francisco Villalba MD    Patient was seen, evaluated and plan was formulated in conjunction with me and I agree with the above.  Wade Singh MD    Answers for HPI/ROS submitted by the patient on 8/16/2017   General Symptoms: Yes  Skin Symptoms: Yes  HENT Symptoms: Yes  EYE SYMPTOMS: Yes  HEART SYMPTOMS: Yes  LUNG SYMPTOMS:  No  INTESTINAL SYMPTOMS: Yes  URINARY SYMPTOMS: Yes  GYNECOLOGIC SYMPTOMS: No  BREAST SYMPTOMS: No  SKELETAL SYMPTOMS: Yes  BLOOD SYMPTOMS: Yes  NERVOUS SYSTEM SYMPTOMS: Yes  MENTAL HEALTH SYMPTOMS: No  Fever: Yes  Loss of appetite: Yes  Weight loss: No  Weight gain: Yes  Fatigue: Yes  Night sweats: Yes  Chills: Yes  Increased stress: No  Excessive hunger: No  Excessive thirst: Yes  Feeling hot or cold when others believe the temperature is normal: Yes  Loss of height: No  Post-operative complications: No  Surgical site pain: Yes  Hallucinations: No  Change in or Loss of Energy: Yes  Hyperactivity: No  Confusion: Yes  Changes in hair: No  Changes in moles/birth marks: Yes  Itching: Yes  Rashes: Yes  Changes in nails: No  Acne: No  Hair in places you don't want it: No  Change in facial hair: No  Warts: No  Non-healing sores: Yes  Scarring: Yes  Flaking of skin: No  Color changes of hands/feet in cold : Yes  Sun sensitivity: Yes  Skin thickening: No  Ear pain: Yes  Ear discharge: No  Hearing loss: No  Tinnitus: Yes  Nosebleeds: No  Congestion: Yes  Sinus pain: Yes  Trouble swallowing: Yes   Voice hoarseness: Yes  Mouth sores: No  Sore throat: Yes  Tooth pain: No  Gum tenderness: No  Bleeding gums: No  Change in taste: Yes  Change in sense of smell: Yes  Dry mouth: Yes  Hearing aid used: No  Neck lump: Yes  Eye pain: Yes  Vision loss: No  Dry eyes: Yes  Watery eyes: Yes  Eye bulging: No  Double vision: Yes  Flashing of lights: Yes  Spots: Yes  Floaters: Yes  Redness: Yes  Crossed eyes: No  Tunnel Vision: No  Yellowing of eyes: No  Eye irritation: Yes  Chest pain or pressure: No  Fast or irregular heartbeat: Yes  Pain in legs with walking: Yes  Swelling in feet or ankles: Yes  Trouble breathing while lying down: No  Fingers or Toes appear blue: Yes  High blood pressure: Yes  Low blood pressure: Yes  Fainting: Yes  Murmurs: No  Chest pain on exertion: No  Chest pain at rest: No  Cramping pain in leg during exercise:  Yes  Pacemaker: No  Varicose veins: No  Edema or swelling: Yes  Fast heart beat: Yes  Wake up at night with shortness of breath: No  Heart flutters: No  Light-headedness: Yes  Exercise intolerance: Yes  Heart burn or indigestion: Yes  Nausea: Yes  Vomiting: Yes  Abdominal pain: Yes  Bloating: Yes  Constipation: Yes  Diarrhea: Yes  Blood in stool: No  Black stools: Yes  Rectal or Anal pain: No  Fecal incontinence: No  Rectal bleeding: No  Yellowing of skin or eyes: No  Vomit with blood: No  Change in stools: Yes  Hemorrhoids: Yes  Trouble holding urine or incontinence: Yes  Pain or burning: No  Trouble starting or stopping: Yes  Increased frequency of urination: Yes  Blood in urine: No  Decreased frequency of urination: No  Frequent nighttime urination: Yes  Flank pain: Yes  Difficulty emptying bladder: Yes  Back pain: Yes  Muscle aches: Yes  Neck pain: Yes  Swollen joints: Yes  Joint pain: Yes  Bone pain: Yes  Muscle cramps: Yes  Muscle weakness: Yes  Joint stiffness: Yes  Bone fracture: No  Anemia: No  Swollen glands: Yes  Easy bleeding or bruising: Yes  Trouble with coordination: Yes  Dizziness or trouble with balance: Yes  Fainting or black-out spells: Yes  Memory loss: Yes  Headache: Yes  Seizures: No  Speech problems: No  Tingling: Yes  Tremor: Yes  Weakness: Yes  Difficulty walking: Yes  Paralysis: No  Numbness: Yes

## 2017-08-30 NOTE — NURSING NOTE
Chief Complaint   Patient presents with     Consult     chronic sinusitis     Charlotte Davis Medical Assistant

## 2017-08-30 NOTE — LETTER
"8/30/2017       RE: Ada Welch  [address] [place] [address]     Dear Colleague,    Thank you for referring your patient, Ada Welch, to the [facility] UROLOGY AND INST FOR PROSTATE AND UROLOGIC CANCERS at [facility]. Please see a copy of my visit note below.    CC: Urinary Urgency    HPI:  Ada Welch is a 46 year old female with gastroparesis, Ehler's Danlos Syndrome, Mast cell disease,  Chiari malformation, dysautonomia who presents today for evaluation of urinary urgency with associated with incontinence.This problem has been going on since she underwent a spinal fusion in 2013  and has been getting worse. She states that her night time symptoms are significantly worse than daytime.  Worsened when she has constipation.  She denies any dysuria, nocturia, hematuria, pyuria, hesitency, intermittency, feeling of incomplete emptying or stones. She reports a previous history of recurrent UTIs in the past but no recent infections.   The patient has a history of gastroparesis and recently started on a bowel regimen. But still reports constipation. She also believes that she may have sleep apnea but has not been formally tested.     Obstetric Hx:  She is G5    Past Surgical History:   Procedure Laterality Date     AS REPAIR OF NASAL SEPTUM       TONSILLECTOMY       TUBAL LIGATION     -     Meds, Allergies, FHx and SHx reviewed per nurse's intake note.    ROS positive for chronic pain and what is mentioned in the HPI and negative for the remainder of a 12 point ROS.    PEx:   Blood pressure (!) 148/94, pulse 106, height 1.689 m (5' 6.5\"), weight 92.5 kg (204 lb), not currently breastfeeding.  5' 6.5\", Body mass index is 32.43 kg/(m^2)., 204 lbs 0 oz  Gen appearance:  Well groomed  HEENT:  EOMI, AT NC  Psych:  Normal Affect  Neuro:  A/O X 3  Skin:  Warm to touch  Resp:  No increased respiratory effort  Vasc:  RRR  lymph: Bilateral mild lower extremity edema "   Musk:  Limited ROM in extremities due to extensive back and cervical disease. Patient has neck brace on due to Chiari malformation         Admission on 07/24/2017, Discharged on 07/27/2017   Component Date Value Ref Range Status     Sodium 07/24/2017 141  133 - 144 mmol/L Final     Potassium 07/24/2017 4.0  3.4 - 5.3 mmol/L Final     Chloride 07/24/2017 110* 94 - 109 mmol/L Final     Carbon Dioxide 07/24/2017 27  20 - 32 mmol/L Final     Anion Gap 07/24/2017 5  3 - 14 mmol/L Final     Glucose 07/24/2017 88  70 - 99 mg/dL Final     Urea Nitrogen 07/24/2017 8  7 - 30 mg/dL Final     Creatinine 07/24/2017 0.85  0.52 - 1.04 mg/dL Final     GFR Estimate 07/24/2017 72  >60 mL/min/1.7m2 Final    Non  GFR Calc     GFR Estimate If Black 07/24/2017 87  >60 mL/min/1.7m2 Final    African American GFR Calc     Calcium 07/24/2017 8.9  8.5 - 10.1 mg/dL Final     Bilirubin Total 07/24/2017 0.6  0.2 - 1.3 mg/dL Final     Albumin 07/24/2017 4.1  3.4 - 5.0 g/dL Final     Protein Total 07/24/2017 7.4  6.8 - 8.8 g/dL Final     Alkaline Phosphatase 07/24/2017 148  40 - 150 U/L Final     ALT 07/24/2017 56* 0 - 50 U/L Final     AST 07/24/2017 36  0 - 45 U/L Final     WBC 07/24/2017 5.7  4.0 - 11.0 10e9/L Final     RBC Count 07/24/2017 4.41  3.8 - 5.2 10e12/L Final     Hemoglobin 07/24/2017 13.5  11.7 - 15.7 g/dL Final     Hematocrit 07/24/2017 39.5  35.0 - 47.0 % Final     MCV 07/24/2017 90  78 - 100 fl Final     MCH 07/24/2017 30.6  26.5 - 33.0 pg Final     MCHC 07/24/2017 34.2  31.5 - 36.5 g/dL Final     RDW 07/24/2017 12.6  10.0 - 15.0 % Final     Platelet Count 07/24/2017 238  150 - 450 10e9/L Final     Diff Method 07/24/2017 Automated Method   Final     % Neutrophils 07/24/2017 61.9  % Final     % Lymphocytes 07/24/2017 29.0  % Final     % Monocytes 07/24/2017 5.1  % Final     % Eosinophils 07/24/2017 4.0  % Final     % Basophils 07/24/2017 0.0  % Final     % Immature Granulocytes 07/24/2017 0.0  % Final      Nucleated RBCs 07/24/2017 0  0 /100 Final     Absolute Neutrophil 07/24/2017 3.5  1.6 - 8.3 10e9/L Final     Absolute Lymphocytes 07/24/2017 1.7  0.8 - 5.3 10e9/L Final     Absolute Monocytes 07/24/2017 0.3  0.0 - 1.3 10e9/L Final     Absolute Eosinophils 07/24/2017 0.2  0.0 - 0.7 10e9/L Final     Absolute Basophils 07/24/2017 0.0  0.0 - 0.2 10e9/L Final     Abs Immature Granulocytes 07/24/2017 0.0  0 - 0.4 10e9/L Final     Absolute Nucleated RBC 07/24/2017 0.0   Final     CRP Inflammation 07/24/2017 <2.9  0.0 - 8.0 mg/L Final     Sed Rate 07/24/2017 10  0 - 20 mm/h Final     INR 07/25/2017 1.01  0.86 - 1.14 Final     WBC CSF 07/26/2017 0  0 - 5 /uL Final     RBC CSF 07/26/2017 0  0 - 2 /uL Final     Tube Number 07/26/2017 4  # Final     Color CSF 07/26/2017 Colorless  CLRL Final     Appearance CSF 07/26/2017 Clear  CLER Final     Glucose CSF 07/26/2017 63  40 - 70 mg/dL Final    CSF glucose concentrations are about 60 percent of normal plasma glucose.     Protein Total CSF 07/26/2017 36  15 - 60 mg/dL Final     Glucose 07/26/2017 98  70 - 99 mg/dL Final       IMAGING:    ASSESSMENT and PLAN:  This is a 46 year old female with a complex medical history presenting with  night time urgency with incontinence.  We discussed etiology of her symptoms including bowel bladder dysfunction and possible sleep apnea based on reported history. . Different management options were discussed with the patient including observation, lifestyle modification, and anticholinergics .   The patient has elected to proceed with lifestyle modification   - Will order sleep study to evaluate for sleep apnea   - Due to her history of constipation would recommend a optimizing her bowel regimen including daily Miralax prior to initiation of anticholinergics.   -f/u in 3 months to reassess      Thank you for allowing me to participate in Ms. Welch's care.  I will keep you updated on her progress.    Patient examined with Dr. Francisco Jung  Pratibha Villalba MD    Patient was seen, evaluated and plan was formulated in conjunction with me and I agree with the above.  Wade Singh MD

## 2017-08-30 NOTE — NURSING NOTE
Chief Complaint   Patient presents with     New Patient     Tachycardia; EKG     Vitals were taken and medications were reconciled. EKG was performed.    Dunia Zavala MA    12:33 PM

## 2017-08-30 NOTE — MR AVS SNAPSHOT
After Visit Summary   8/30/2017    Ada Welch    MRN: [id number]           Patient Information     Date Of Birth          1970        Visit Information        Provider Department      8/30/2017 1:30 PM Rey Haywood MD [facility]        Today's Diagnoses     Orthostatic hypotension    -  1    Tachycardia          Care Instructions    You were seen today in the Cardiovascular Clinic at the [facility].      Cardiology Providers you saw during your visit:  Dr. Haywood    Recommendations:  Please increase your sodium intake, this will help you from becoming dehydrated. 2) Please have an ECHO. 3) Please wear compression stockings.     Follow-up:  With Dr. Russell.    Thank you for your visit today!       Please call if you have any questions or concerns.  Cardiology Care Coordinator  Daisy Irby RN    For scheduling needs [phone] option 1 and the option 1 again.  Nursing questions: [phone] option 1 then chose option 3 for the triage nurse.  For emergencies call 911.                  Follow-ups after your visit        Follow-up notes from your care team     Return for Dr. Haywood Tachycardia.      Your next 10 appointments already scheduled     Aug 30, 2017  3:00 PM CDT   (Arrive by 2:45 PM)   New Patient Visit with Zoe Barahona MD   [facility] ([facility] [place])    [address]  4th Floor  [facility] [address] [phone]            Sep 06, 2017 11:20 AM CDT   DARIA Spine with Armani Leon PT   [facility] [place] [facility] ([facility] [place])    [address] [place] [address]  [address]  [place] [address]   [phone]            Sep 12, 2017  9:05 AM CDT   (Arrive by 8:50 AM)   New Patient Visit with Jerad Hebert MD   [facility] ([facility] [place])    [address]  2nd Floor  [facility] [address] [phone]             Sep 13, 2017 11:20 AM CDT   DARIA Spine with Armani Leon PT   [facility] [place] [place] [facility] ([facility] [place])    [address] [place] [address]  [address]  [place] [address]   [phone]            Sep 20, 2017 10:40 AM CDT   DARIA Spine with Armani Leon PT   [facility] [place] [place] [facility] ([facility] [place])    [address] [place] [address]  [address]  [place] [address]   [phone]            Sep 21, 2017 11:25 AM CDT   (Arrive by 11:10 AM)   Return Visit with Ellyn Rivera MD   [facility] ([facility])    [address] [place] [id number] [phone]            Sep 21, 2017  2:00 PM CDT   (Arrive by 1:45 PM)   Return Visit with FIDELINA Ridley [facility] ([facility])    [address] [place] [id number] [phone]            Sep 26, 2017 12:30 PM CDT   (Arrive by 12:15 PM)   NEW BLEEDING DISORDER with Cynthia Leach MD   [facility] ([facility])    [address] [place] [id number] [phone]            Sep 27, 2017 11:20 AM CDT   DARIA Spine with Armani Leon PT   [facility] [place] [facility] ([facility] [place])    [address] [place] [address]  [address]  [place] [address]   [phone]            Oct 04, 2017  8:00 AM CDT   (Arrive by 7:45 AM)   New Patient Visit with Seth Galeano MD   [facility] ([facility])    [address] [place] [id number] [phone]              Future tests that were ordered for you today     Open Future Orders        Priority Expected Expires Ordered    Echocardiogram Complete Routine  8/30/2018 8/30/2017    SLEEP EVALUATION & MANAGEMENT REFERRAL - ADULT Routine  8/30/2018 8/30/2017           "  Who to contact     If you have questions or need follow up information about today's clinic visit or your schedule please contact [facility] directly at [phone].  Normal or non-critical lab and imaging results will be communicated to you by MyChart, letter or phone within 4 business days after the clinic has received the results. If you do not hear from us within 7 days, please contact the clinic through [employer] or phone. If you have a critical or abnormal lab result, we will notify you by phone as soon as possible.  Submit refill requests through [employer] or call your pharmacy and they will forward the refill request to us. Please allow 3 business days for your refill to be completed.          Additional Information About Your Visit        [employer] Information     [employer] gives you secure access to your electronic health record. If you see a primary care provider, you can also send messages to your care team and make appointments. If you have questions, please call your primary care clinic.  If you do not have a primary care provider, please call [phone] and they will assist you.        Care EveryWhere ID     This is your Care EveryWhere ID. This could be used by other organizations to access your [facility] medical records  [id number]        Your Vitals Were     Pulse Height Pulse Oximetry BMI (Body Mass Index)          103 1.689 m (5' 6.5\") 95% 32.43 kg/m2         Blood Pressure from Last 3 Encounters:   08/30/17 (!) 145/106   08/30/17 (!) 148/94   08/14/17 129/90    Weight from Last 3 Encounters:   08/30/17 92.5 kg (204 lb)   08/30/17 92.5 kg (204 lb)   08/07/17 88.5 kg (195 lb)              We Performed the Following     EKG 12-lead, tracing only (Future)          Today's Medication Changes          These changes are accurate as of: 8/30/17  2:11 PM.  If you have any questions, ask your nurse or doctor.               Start taking these medicines.        Dose/Directions    COMPRESSION " STOCKINGS   Used for:  Orthostatic hypotension   Started by:  Rey Tello MD        Dose:  1 each   1 each daily 20-30 mmHg Thigh Length measure and fit, color and style per patient preference. Doshar n jose per need.   Quantity:  3 each   Refills:  3         These medicines have changed or have updated prescriptions.        Dose/Directions    amitriptyline 10 MG tablet   Commonly known as:  ELAVIL   This may have changed:  additional instructions   Used for:  Chronic migraine without aura without status migrainosus, not intractable        Dose:  10 mg   Take 1 tablet (10 mg) by mouth At Bedtime   Quantity:  60 tablet   Refills:  1            Where to get your medicines      Some of these will need a paper prescription and others can be bought over the counter.  Ask your nurse if you have questions.     Bring a paper prescription for each of these medications     COMPRESSION STOCKINGS                Primary Care Provider Office Phone # Fax #    Ellyn Rivera [phone]       [address] [place] [address] [address] [place] [id number]        Equal Access to Services     [facility] : Hadii kia ku hadasho Soomaali, waaxda luqadaha, qaybta kaalmada adeegyada, waxay jerryin haystanley dykes . So [facility] [phone].    ATENCIÓN: Si louis samuel, tiene a sweeney disposición servicios gratuitos de asistencia lingüística. Llame al [phone].    We comply with applicable federal civil rights laws and [place] laws. We do not discriminate on the basis of race, color, national origin, age, disability sex, sexual orientation or gender identity.            Thank you!     Thank you for choosing [facility]  for your care. Our goal is always to provide you with excellent care. Hearing back from our patients is one way we can continue to improve our services. Please take a few minutes to complete the written survey that you may receive in the mail after your visit with us. Thank you!              Your Updated Medication List - Protect others around you: Learn how to safely use, store and throw away your medicines at www.disposemymeds.org.          This list is accurate as of: 8/30/17  2:11 PM.  Always use your most recent med list.                   Brand Name Dispense Instructions for use Diagnosis    acetaminophen-codeine 300-30 MG per tablet    TYLENOL #3          ADVAIR DISKUS 250-50 MCG/DOSE diskus inhaler   Generic drug:  fluticasone-salmeterol           albuterol 108 (90 BASE) MCG/ACT Inhaler    PROAIR HFA/PROVENTIL HFA/VENTOLIN HFA     Inhale 2 puffs into the lungs        amitriptyline 10 MG tablet    ELAVIL    60 tablet    Take 1 tablet (10 mg) by mouth At Bedtime    Chronic migraine without aura without status migrainosus, not intractable       buprenorphine 10 MCG/HR WK patch    BUTRANS    4 patch    Place 1 patch onto the skin every 7 days    Postlaminectomy syndrome of lumbar region       cetirizine 10 MG tablet    zyrTEC          * citalopram 40 MG tablet    celeXA     Take 40 mg by mouth        * citalopram 20 MG tablet    celeXA     Take 20 mg by mouth        COMPRESSION STOCKINGS     3 each    1 each daily 20-30 mmHg Thigh Length measure and fit, color and style per patient preference. Doshar n jose per need.    Orthostatic hypotension       cyclobenzaprine 10 MG tablet    FLEXERIL     Take 10 mg by mouth        diazepam 5 MG tablet    VALIUM     Take 5 mg by mouth        EPINEPHrine 0.3 MG/0.3ML injection 2-pack    EPIPEN/ADRENACLICK/or ANY BX GENERIC EQUIV     Inject 0.3 mg into the muscle        etodolac 400 MG tablet    LODINE     Take 400 mg by mouth        fentaNYL 25 mcg/hr 72 hr patch    DURAGESIC     25 mcg        fluocinonide 0.05 % cream    LIDEX          fluticasone 50 MCG/ACT spray    FLONASE     2 sprays        FLUTICASONE FUROATE NA      Spray 50 mcg in nostril At Bedtime        furosemide 40 MG tablet    LASIX     Take 40 mg by mouth        HYDROmorphone 4 MG tablet     DILAUDID     Take 4 mg by mouth        ketamine (KETALAR) 5%-gabapentin (NEURONTIN) 8%-lidocaine 2.5% 5%-8% Gel topical PLO cream     30 g    Apply 1 g topically 3 times daily as needed    Postlaminectomy syndrome of lumbosacral region, Postlaminectomy syndrome of lumbar region       lamoTRIgine 25 MG tablet    LaMICtal     Take 25 mg by mouth        levothyroxine 25 mcg/mL Susp    SYNTHROID     25 mcg every morning        Melatonin Gummies 2.5 MG Chew           meloxicam 15 MG tablet    MOBIC     Take 15 mg by mouth        methylPREDNISolone 4 MG tablet    MEDROL DOSEPAK    21 tablet    Follow package instructions    Intractable headache, unspecified chronicity pattern, unspecified headache type       metoclopramide 5 MG tablet    REGLAN    20 tablet    Take 1 tablet (5 mg) by mouth every 6 hours as needed    Intractable chronic migraine without aura and with status migrainosus       metoprolol 100 MG 24 hr tablet    TOPROL-XL     Take 100 mg by mouth        midodrine 5 MG tablet    PROAMATINE     Take 5 mg by mouth        MULTI-VITAMINS Tabs           MULTIVITAMIN PO      Take by mouth daily        * thyroid 15 MG Tabs tablet      Take 15 mg by mouth        * thyroid 60 MG tablet    ARMOUR     Take 60 mg by mouth        * NATURE-THROID 81.25 MG Tabs   Generic drug:  Thyroid      Take 1 tablet by mouth        omeprazole 20 MG CR capsule    priLOSEC     20 mg 2 times daily        * ondansetron 4 MG ODT tab    ZOFRAN-ODT     4 mg every 12 hours as needed        * ondansetron 4 MG ODT tab    ZOFRAN-ODT    60 tablet    Take 1-2 tablets (4-8 mg) by mouth every 12 hours as needed for nausea    Migraine without status migrainosus, not intractable, unspecified migraine type       * ondansetron 4 MG ODT tab    ZOFRAN ODT    20 tablet    Take 1-2 tablets (4-8 mg) by mouth every 8 hours as needed for nausea    Intractable headache, unspecified chronicity pattern, unspecified headache type       ondansetron 4 MG tablet     ZOFRAN     Take 4 mg by mouth        phentermine 37.5 MG tablet    ADIPEX-P     Take 37.5 mg by mouth        polyethylene glycol powder    MIRALAX/GLYCOLAX          PROBIOTIC DAILY PO      Take by mouth 2 times daily        rizatriptan 5 MG ODT tab    MAXALT-MLT    18 tablet    Take 1-2 tablets (5-10 mg) by mouth at onset of headache for migraine (max 30 mg in 24 hours)    Migraine without status migrainosus, not intractable, unspecified migraine type       senna 8.6 MG tablet    SENOKOT     Take 17.2-34.4 mg by mouth        SUMAtriptan 6 MG/0.5ML pen injector kit    IMITREX STATDOSE    2 kit    Inject 0.5 mLs (6 mg) Subcutaneous at onset of headache for migraine (may repeat once after 2 hrs) May repeat in 1 hour. Max 12 mg/24 hours.    Chronic migraine without aura without status migrainosus, not intractable       tiZANidine 4 MG tablet    ZANAFLEX    90 tablet    Take 2 tablets (8 mg) by mouth 3 times daily as needed for muscle spasms    Post laminectomy syndrome       traMADol 50 MG tablet    ULTRAM    30 tablet    Take 1 tablet (50 mg) by mouth every 6 hours as needed for pain maximum 6 tablet(s) per day    EDS (Tom-Danlos syndrome)       vitamin D 24320 UNIT capsule    ERGOCALCIFEROL          zonisamide 100 MG capsule    ZONEGRAN     Take 100 mg by mouth        * Notice:  This list has 8 medication(s) that are the same as other medications prescribed for you. Read the directions carefully, and ask your doctor or other care provider to review them with you.

## 2017-08-30 NOTE — PROGRESS NOTES
Patient seen in clinic for evaluation and iterative programming of her [employer] loop recorder per MD orders.  Patient is scheduled to see Dr. Tello today. Normal loop recorder function.  49 patient activated episodes recorded - 1 stored EGM reveals NSR.  188 tachy episodes recorded - stored EGM's reveal ST-SVT @ ~ 168 bpm or noise.  5 episodes recorded as pauses and 2 episodes recorded as bradycardia - stored EGM's reveal undersensing of R waves.  4 AT/AF episodes recorded - total AT/AF time = 8 minutes, no stored EGM's for review.  Intrinsic rhythm = NSR @ 100 bpm.  Loop recorder battery status = good.  Patient reports that her last syncopal episode was in July.  No stored EGM for review.  Dr. Tello notified of interrogation results.  Patient has not had her device checked for > 6 months.  Patient reports that her clinic stopped monitoring her loop recorder.  Plan for patient to see Dr. Russell in near future per Dr. Tello.    Loop recorder interrogation

## 2017-08-30 NOTE — LETTER
2017       RE: Ada Welch  [address] [place] [address]     Dear Colleague,    Thank you for referring your patient, Ada Welch, to the [facility] EAR NOSE AND THROAT at [facility]. Please see a copy of my visit note below.    Otolaryngology Adult Consultation    Patient: Ada Welch   : 1970     Patient presents with:  Consult:   Chief Complaint   Patient presents with     Consult     chronic sinusitis        Referring Provider: Ellyn Rivera   Consulting Physician:  Zoe Barahona MD       Assessment/Plan: ASSESSMENT AND PLAN:  Ada Welch has a very complex past medical history including a significant history of sinus disease.  I would like to follow her for a while to get to know her sinus issues.  I did examine her today and found purulent secretions.  I took a culture.  We will start her on doxycycline and give her a three week course.  It is unclear to me at this point in time whether or not further surgical therapy would be helpful.  She did have a CT scan that I reviewed that shows patchy sinus disease.  She also has a complex immunologic history.  She is concerned that even though her laboratory workup was fairly unremarkable that she may have immunodeficiency.  We may have her meet with Dr. Mendoza from [facility] for further evaluation as well.  She is comfortable with this plan.  I will see her back in several weeks after she completes a course of doxycycline.  We may consider additional imaging at that time to determine the degree of obstructive pathology in her sinuses.          HPI: Ada Welch is a 46 year old female seen today in the Otolaryngology Clinic for a history of chronic rhinosinusitis and Tom-Danlos syndrome.  She has been following with Dr. Bain but has moved to the [facility] and is now transitioning her care.  She has been here since .  Initially, she was evaluated and treated  at [place].  She had a septoplasty for a nasal injury.  Nothing was done with her sinuses, and she was having some sinus issues at that time.  She saw Dr. Bain and was diagnosed with recurrent acute sinusitis, underwent endoscopic sinus surgery, and has been doing somewhat better but continues to be symptomatic.  She had surgery in December of 2016.  She has not been on any antibiotics since that time but she feels like in the last three weeks she has developed increasing sinus congestion.  She worries that she may have a sinus infection.  She has many complications related to her Tom-Danlos syndrome.  She has chronic pain.  I did review her past medical history.  She is on several medications and is taking Zyrtec and Flonase specifically for her nose.  She has a lymph node that she would like me to follow.  It is an enlarged lymph node in the neck that has undergone fine needle aspiration and shows reactivity.  She states that it occasionally swells and is very uncomfortable.  She has undergone an immune system workup which was relatively normal.  I reviewed those results today.  I do believe she has also had an allergy workup and multiple mild reactions were identified to trees, ragweed and dust mite.         Current Outpatient Prescriptions on File Prior to Visit:  traMADol (ULTRAM) 50 MG tablet Take 1 tablet (50 mg) by mouth every 6 hours as needed for pain maximum 6 tablet(s) per day   metoclopramide (REGLAN) 5 MG tablet Take 1 tablet (5 mg) by mouth every 6 hours as needed   rizatriptan (MAXALT-MLT) 5 MG ODT tab Take 1-2 tablets (5-10 mg) by mouth at onset of headache for migraine (max 30 mg in 24 hours)   buprenorphine (BUTRANS) 10 MCG/HR WK patch Place 1 patch onto the skin every 7 days   amitriptyline (ELAVIL) 10 MG tablet Take 1 tablet (10 mg) by mouth At Bedtime (Patient taking differently: Take 10 mg by mouth At Bedtime Take 2 tablets)   SUMAtriptan (IMITREX STATDOSE) 6 MG/0.5ML pen injector kit  Inject 0.5 mLs (6 mg) Subcutaneous at onset of headache for migraine (may repeat once after 2 hrs) May repeat in 1 hour. Max 12 mg/24 hours.   methylPREDNISolone (MEDROL DOSEPAK) 4 MG tablet Follow package instructions   ondansetron (ZOFRAN ODT) 4 MG ODT tab Take 1-2 tablets (4-8 mg) by mouth every 8 hours as needed for nausea   ondansetron (ZOFRAN-ODT) 4 MG ODT tab Take 1-2 tablets (4-8 mg) by mouth every 12 hours as needed for nausea   ketamine, KETALAR, 5%-gabapentin, NEURONTIN, 8%-lidocaine 2.5% 5%-8% GEL topical PLO cream Apply 1 g topically 3 times daily as needed   tiZANidine (ZANAFLEX) 4 MG tablet Take 2 tablets (8 mg) by mouth 3 times daily as needed for muscle spasms   levothyroxine (SYNTHROID) 25 mcg/mL SUSP 25 mcg every morning   omeprazole (PRILOSEC) 20 MG CR capsule 20 mg 2 times daily   ondansetron (ZOFRAN-ODT) 4 MG ODT tab 4 mg every 12 hours as needed   fluticasone-salmeterol (ADVAIR DISKUS) 250-50 MCG/DOSE diskus inhaler    cetirizine (ZYRTEC) 10 MG tablet    polyethylene glycol (MIRALAX/GLYCOLAX) powder    FLUTICASONE FUROATE NA Spray 50 mcg in nostril At Bedtime   Multiple Vitamins-Minerals (MULTIVITAMIN PO) Take by mouth daily   Probiotic Product (PROBIOTIC DAILY PO) Take by mouth 2 times daily     No current facility-administered medications on file prior to visit.        Allergies   Allergen Reactions     Bee Venom Anaphylaxis, Difficulty breathing, Palpitations, Shortness Of Breath and Visual Disturbance     Patient does carry Epi-Pen     Chicken-Derived Products (Egg) Diarrhea     Other reaction(s): Nausea  Patient will self monitor  Other reaction(s): GI intolerance     Gabapentin      Gluten Meal      Lactose Dermatitis and Diarrhea     Milk Protein Extract      Other reaction(s): GI intolerance     Topiramate      Wheat Diarrhea     Wheat Bran      Other reaction(s): Nausea  Patient will self monitor       Past Medical History:   Diagnosis Date     Allergic rhinitis 09/2007     Arthritis  11/01/2013    Found during search for cause of back pain     Autoimmune disease (H) 2016    Immunosuppresive     Bleeding disorder (H) 2016    Bruise easily     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     Chronic sinusitis 00/2007    Diagnosed with chronic pansinusitis 2016     Chronic tonsillitis 1980    Had tonsils removed 02/1981, rheumatic fever     Gastroesophageal reflux disease 3/1/2017    I have large hiatal hernia     Hernia, abdominal 10/2016    Hiatal Hernia     Hiatal hernia 2016    Have adeline hiatel hernia     Hoarseness Unsure    It comes and goes     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     Migraines 1/1/2007    Unsure of exact date     Nasal polyps 2013    Were revoved 03/2017, benign     Pneumonia Unsure    Have had pneumonia several times     Swelling, mass, or lump in head and neck 08/2016    Lymph node has been growing for last year     Thyroid disease 01/01/2008         Review of Systems  [facility] ROS 8/16/2017   Constitutional Weight gain, Appetite change, Unexplained fatigue, Problems with sleep, Unexplained fever or night sweats   Neurology Dizzy spells, Numbness, Unexplained weakness, Headache   Eyes Double vision   Ears, Nose, Throat Ear pain, Ringing/noise in ears, Nasal congestion or drainage, Sore throat, Trouble swallowing, Hoarseness   Cardiopulmonary Breathing problems, Wheezing, Chest pain   Gastrointestinal/Genitourinary Heartburn/indigestion, Pain with urination, Constipation, Diarrhea   Musculoskeletal Sore or stiff joints, Swollen joints, Back pain, Neck pain, Swollen legs/feet   Allergy/Immunology Allergies or hay fever, Skin changes, Rash   Hematologic Bleeding problems, Easy bruising, Lymph node swelling   Endocrine Thirst, Heat or cold intolerance, Frequent urination   Skin Change in moles, Skin lesions        14 point ROS neg other than the symptoms noted above.    Physical Exam:    General Assessment   The patient is  alert, oriented and in no acute distress. Wearing a c-collar  Head/Face/Scalp  Grossly normal.   Nose  External nose is straight, skin is normal. Intranasal exam (anterior rhinoscopy) reveals normal moist mucosa, turbinate tissue without edema, erythema or crusting.  Septum mainly in the midline.    Mouth  Oral cavity shows healthy mucosa with out ulceration, masses or other lesions involving the tongue, palate, buccal mucosa, floor of mouth or gingiva.  Dentition in good repair.  Oropharynx with normal tonsils, posterior wall and base of tongue.  Neck  No significant adenopathy, thyroid or salivary gland abnormality.       Procedure:  Nasal endoscopy performed to examine the posterior nasal passages for pathology.    Verbal consent obtained. Topical anesthetic/decongestant solution applied per patient request.   A rigid endoscope was passed into each nasal cavity separately.  Findings are as follows:   Left middle meatus:  Normal healthy meatus, + purulence, no polyps.    Left posterior nasal cavity:  No masses, lesions or abnormal tissue.   Right middle meatus:  Normal healthy meatus, no purulence or polyps   Right posterior nasal cavity:  No masses, lesions or abnormal tissue   Nasopharynx:  Clear, no lesions, crusting or inflammation      Again, thank you for allowing me to participate in the care of your patient.      Sincerely,    Zoe Barahona MD

## 2017-08-31 NOTE — NURSING NOTE
Chief Complaint   Patient presents with     New Patient     Tachycardia; EKG     Cardiology Providers you saw during your visit:  Dr. Haywood    Recommendations:  Please increase your sodium intake, this will help you from becoming dehydrated. 2) Please have an ECHO. 3) Please wear compression stockings.     Follow-up:  With Dr. Russell.    Cardiac Testing: Patient given instructions regarding  echocardiogram . Discussed purpose, preparation, procedure and when to expect results reported back to the patient. Patient demonstrated understanding of this information and agreed to call with further questions or concerns.  Med Reconcile: Reviewed and verified all current medications with the patient. The updated medication list was printed and given to the patient.  Return Appointment: Patient given instructions regarding scheduling next clinic visit. Patient demonstrated understanding of this information and agreed to call with further questions or concerns.  Patient stated she understood all health information given and agreed to call with further questions or concerns.

## 2017-09-01 LAB — INTERPRETATION ECG - MUSE: NORMAL

## 2017-09-02 LAB
BACTERIA SPEC CULT: ABNORMAL
SPECIMEN SOURCE: ABNORMAL

## 2017-09-04 DIAGNOSIS — G43.909 MIGRAINE WITHOUT STATUS MIGRAINOSUS, NOT INTRACTABLE, UNSPECIFIED MIGRAINE TYPE: ICD-10-CM

## 2017-09-06 DIAGNOSIS — G43.011 INTRACTABLE MIGRAINE WITHOUT AURA AND WITH STATUS MIGRAINOSUS: Primary | ICD-10-CM

## 2017-09-06 DIAGNOSIS — M96.1 POSTLAMINECTOMY SYNDROME OF LUMBAR REGION: ICD-10-CM

## 2017-09-06 RX ORDER — BUPRENORPHINE 10 UG/H
1 PATCH TRANSDERMAL
Qty: 4 PATCH | Refills: 0
Start: 2017-09-06 | End: 2017-10-03

## 2017-09-06 NOTE — TELEPHONE ENCOUNTER
Refill request    Medication:   buprenorphine (BUTRANS) 10 MCG/HR WK patch  Place 1 patch onto the skin every 7 days      MNPMP Checked: Yes    Last refilled 08/10/2017 for 4 patches      Refilled: YES, dated to be refilled 28 days since last refill    Last clinic appointment: 08/07/17  Next clinic appointment: Not scheduled yet    Patient requested to:    Called in to [facility] pharmacy as requested

## 2017-09-06 NOTE — TELEPHONE ENCOUNTER
rizatriptan    Last Written Prescription Date:  8/15/17  Last Fill Quantity: 18,   # refills: 0  Last Office Visit : 7/17/17  Future Office visit:  9/21/17  Creatinine   Date Value Ref Range Status   07/24/2017 0.85 0.52 - 1.04 mg/dL Final     BP Readings from Last 3 Encounters:   08/30/17 (!) 145/106   08/30/17 (!) 148/94   08/14/17 129/90     Routing refill request to provider for review/approval because:  If patient needing 10 treatments per month, refer patient to PCP

## 2017-09-07 RX ORDER — RIZATRIPTAN BENZOATE 5 MG/1
TABLET, ORALLY DISINTEGRATING ORAL
Qty: 20 TABLET | Refills: 1 | Status: SHIPPED | OUTPATIENT
Start: 2017-09-07 | End: 2017-09-11

## 2017-09-08 DIAGNOSIS — M96.1 POST LAMINECTOMY SYNDROME: ICD-10-CM

## 2017-09-11 DIAGNOSIS — G43.909 MIGRAINE WITHOUT STATUS MIGRAINOSUS, NOT INTRACTABLE, UNSPECIFIED MIGRAINE TYPE: ICD-10-CM

## 2017-09-11 RX ORDER — RIZATRIPTAN BENZOATE 5 MG/1
TABLET, ORALLY DISINTEGRATING ORAL
Qty: 20 TABLET | Refills: 6 | Status: SHIPPED | OUTPATIENT
Start: 2017-09-11 | End: 2017-10-02

## 2017-09-12 ENCOUNTER — OFFICE VISIT (OUTPATIENT)
Dept: GASTROENTEROLOGY | Facility: CLINIC | Age: 47
End: 2017-09-12
Attending: INTERNAL MEDICINE
Payer: COMMERCIAL

## 2017-09-12 VITALS
RESPIRATION RATE: 18 BRPM | TEMPERATURE: 98.1 F | SYSTOLIC BLOOD PRESSURE: 126 MMHG | DIASTOLIC BLOOD PRESSURE: 88 MMHG | BODY MASS INDEX: 32.99 KG/M2 | OXYGEN SATURATION: 90 % | HEART RATE: 87 BPM | WEIGHT: 204.37 LBS

## 2017-09-12 DIAGNOSIS — K58.9 IRRITABLE BOWEL SYNDROME: Primary | ICD-10-CM

## 2017-09-12 DIAGNOSIS — G43.709 CHRONIC MIGRAINE WITHOUT AURA WITHOUT STATUS MIGRAINOSUS, NOT INTRACTABLE: ICD-10-CM

## 2017-09-12 DIAGNOSIS — R13.10 DYSPHAGIA: ICD-10-CM

## 2017-09-12 PROCEDURE — 99213 OFFICE O/P EST LOW 20 MIN: CPT | Mod: ZF

## 2017-09-12 RX ORDER — AMITRIPTYLINE HYDROCHLORIDE 10 MG/1
10 TABLET ORAL AT BEDTIME
Qty: 60 TABLET | Refills: 1 | Status: SHIPPED | OUTPATIENT
Start: 2017-09-12 | End: 2017-09-21

## 2017-09-12 ASSESSMENT — PAIN SCALES - GENERAL: PAINLEVEL: SEVERE PAIN (7)

## 2017-09-12 NOTE — Clinical Note
Patient waiting to be scheduled in 29, needs speech path with video swallow and esophagram and gastric emptying study. We are sending a message for pt to see general GI so she will be contact with that appointment.   Thanks, FRANCHESCA

## 2017-09-12 NOTE — TELEPHONE ENCOUNTER
Electronic authorization for two refills of amitriptyline (ELAVIL) 10 MG tablet sent to pt's pharmacy.    Jessica De Souza RN, BSN

## 2017-09-12 NOTE — PATIENT INSTRUCTIONS
I've included a brief summary of our discussion and care plan from today's visit below.  Please review this information with your primary care provider.  _______________________________________________________________________    Regarding the symptoms that you are experiencing, as discussed, we will proceed with the following tests to further determine the etiology.    - Video swallow with esophagram    - Gastric emptying study      - Referral to general GI clinic      - Pending the results of the above, we will plan on an upper endoscopy under MAC vs general anesthesia, will need evaluation by PAC clinic due to her EDS    As we spoke, once we get results of the tests, we will be discussing them and making the necessary changes.   ______________________________________________________________________    It was a pleasure seeing you in clinic today - please be in touch if there are any further questions that arise following today's visit.  During business hours, you may reach my Clinic Coordinator at [phone].  For urgent/emergent questions after business hours, you may reach the on-call GI Fellow by contacting the [facility]  at [phone].    Any benign/non-urgent test results are usually communicated via letter or [employer] message within 1-2 weeks after completion.  Urgent results (those that require a change in the previously-discussed care plan) are usually communicated via a phone call once available from our clinic staff to discuss the results and the next steps in your evaluation.    I recommend signing up for [employer] access if you have not already done so and are comfortable with using a computer.  This allows for online access to your lab results and also helps you communicate efficiently with my clinic should any questions arise in your care.    To schedule a follow up appointment please call [phone].     Sincerely,    Jerad Lyons MD    [facility]   [place] - Department of Medicine  Division of Gastroenterology

## 2017-09-12 NOTE — MR AVS SNAPSHOT
After Visit Summary   9/12/2017    Ada Welch    MRN: [id number]           Patient Information     Date Of Birth          1970        Visit Information        Provider Department      9/12/2017 9:05 AM Jerad Garnett MD [facility]        Today's Diagnoses     Irritable bowel syndrome    -  1    Dysphagia          Care Instructions    I've included a brief summary of our discussion and care plan from today's visit below.  Please review this information with your primary care provider.  _______________________________________________________________________    Regarding the symptoms that you are experiencing, as discussed, we will proceed with the following tests to further determine the etiology.    - Video swallow with esophagram    - Gastric emptying study      - Referral to general GI clinic      - Pending the results of the above, we will plan on an upper endoscopy under MAC vs general anesthesia, will need evaluation by PAC clinic due to her EDS    As we spoke, once we get results of the tests, we will be discussing them and making the necessary changes.   ______________________________________________________________________    It was a pleasure seeing you in clinic today - please be in touch if there are any further questions that arise following today's visit.  During business hours, you may reach my Clinic Coordinator at [phone].  For urgent/emergent questions after business hours, you may reach the on-call GI Fellow by contacting the [facility]  at [phone].    Any benign/non-urgent test results are usually communicated via letter or MyChart message within 1-2 weeks after completion.  Urgent results (those that require a change in the previously-discussed care plan) are usually communicated via a phone call once available from our clinic staff to discuss the results and the next steps in your evaluation.    I recommend  signing up for Glycode access if you have not already done so and are comfortable with using a computer.  This allows for online access to your lab results and also helps you communicate efficiently with my clinic should any questions arise in your care.    To schedule a follow up appointment please call [phone].     Sincerely,    Jerad Lyons MD    [facility] - Department of Medicine  Division of Gastroenterology              Follow-ups after your visit        Additional Services     GASTROENTEROLOGY ADULT REF CONSULT ONLY       Preferred Location: [facility] [phone]      Please be aware that coverage of these services is subject to the terms and limitations of your health insurance plan.  Call member services at your health plan with any benefit or coverage questions.  Any procedures must be performed at a [facility] facility OR coordinated by your clinic's referral office.    Please bring the following with you to your appointment:    (1) Any X-Rays, CTs or MRIs which have been performed.  Contact the facility where they were done to arrange for  prior to your scheduled appointment.    (2) List of current medications   (3) This referral request   (4) Any documents/labs given to you for this referral            SPEECH THERAPY REFERRAL       *This order will print in the [facility] Central Scheduling Office*    [facility] provides Speech Therapy evaluation and treatment and many specialty services across the [facility] system.  If requesting a specialty program, please choose from the list below.    Call [phone] to schedule [facility] at all locations, with the exception of [facility], please call [phone].     Treatment: Evaluation & Treatment  Speech Treatment Diagnosis: evaluate and treat   Special Instructions: Video Swallow evaluate and treat.   Special Programs:  Videoswallow study    Please be aware that coverage of these services is subject to the terms and limitations of your health insurance plan.  Call member services at your health plan with any benefit or coverage questions.      **Note to Provider** To refer patients to therapy outside of the location list, change the order class to External Referral in the order composer.                  Your next 10 appointments already scheduled     Sep 20, 2017 10:40 AM CDT   DARIA Spine with Armani Leon PT   [facility] [place] [facility] ([facility] [place])    [address] [place] [address]  [address]  [place] [address]   [phone]            Sep 21, 2017 11:25 AM CDT   (Arrive by 11:10 AM)   Return Visit with Ellyn Rivera MD   [facility] ([facility])    [address] [place] [address]  [address] [facility] [address]   [phone]            Sep 21, 2017  2:00 PM CDT   (Arrive by 1:45 PM)   Return Visit with FIDELINA Ridley [facility] Neurology ([facility])    [address] [place] [address]  [address] [facility] [address]   [phone]            Sep 26, 2017 12:30 PM CDT   (Arrive by 12:15 PM)   NEW BLEEDING DISORDER with Cynthia Leach MD   [facility] Bleeding and Clotting ([facility])    [address] [place] [address]  [address] [facility] [address]   [phone]            Sep 27, 2017 11:20 AM CDT   DARIA Spine with Armani Leon PT   [facility] ([facility] [place])    [address] [place] [address]  [address]  [place] [address]   [phone]            Sep 28, 2017  8:30 AM CDT   New Sleep Patient with Bennett Ezra Goltz, PA-C   [facility] ([facility])    [address] [facility] [address]  [facility] [address]   [phone]            Oct 04, 2017  8:00 AM CDT   (Arrive by 7:45 AM)   New Patient Visit  with Seth Galeano MD   [facility] Neurology ([facility])    [address] [facility]  [address] [facility] [address] [phone]            Oct 05, 2017  3:30 PM CDT   (Arrive by 3:15 PM)   Implantable Loop Recorder with Uc Cv Device 1   [facility] ([facility])    [address]  [address] [facility] [address]   [phone]            Oct 05, 2017  4:00 PM CDT   (Arrive by 3:45 PM)   NEW ARRHYTHMIA with Kirk Russell MD   [facility] ([facility])    [address]  [address] [facility] [address]   [phone]            Oct 06, 2017 10:00 AM CDT   (Arrive by 9:45 AM)   New Patient Visit with Juana Griffith MD   [facility] Gastroenterology and IBD Clinic ([facility])    [address] [place] [address]  [address] [facility] [address] [phone]              Future tests that were ordered for you today     Open Future Orders        Priority Expected Expires Ordered    NM Gastric Emptying Routine 9/12/2017 9/12/2018 9/12/2017    SPEECH THERAPY REFERRAL Routine  5/20/2018 9/12/2017    XR Esophagram Routine 9/12/2017 9/12/2018 9/12/2017            Who to contact     If you have questions or need follow up information about today's clinic visit or your schedule please contact [facility] directly at [phone].  Normal or non-critical lab and imaging results will be communicated to you by MyChart, letter or phone within 4 business days after the clinic has received the results. If you do not hear from us within 7 days, please contact the clinic through [employer] or phone. If you have a critical or abnormal lab result, we will notify you by phone as soon as possible.  Submit refill requests through [employer] or call your pharmacy and they will forward the refill request to us. Please allow 3 business days for your refill to be completed.           Additional Information About Your Visit        [employer] Information     [employer] gives you secure access to your electronic health record. If you see a primary care provider, you can also send messages to your care team and make appointments. If you have questions, please call your primary care clinic.  If you do not have a primary care provider, please call [phone] and they will assist you.        Care EveryWhere ID     This is your Care EveryWhere ID. This could be used by other organizations to access your [facility] medical records  [id number]        Your Vitals Were     Pulse Temperature Respirations Pulse Oximetry BMI (Body Mass Index)       87 98.1  F (36.7  C) (Oral) 18 90% 32.99 kg/m2        Blood Pressure from Last 3 Encounters:   09/12/17 126/88   08/30/17 (!) 145/106   08/30/17 (!) 148/94    Weight from Last 3 Encounters:   09/12/17 92.7 kg (204 lb 5.9 oz)   08/30/17 92.5 kg (204 lb)   08/30/17 92.5 kg (204 lb)              We Performed the Following     GASTROENTEROLOGY ADULT REF CONSULT ONLY          Today's Medication Changes          These changes are accurate as of: 9/12/17  3:43 PM.  If you have any questions, ask your nurse or doctor.               These medicines have changed or have updated prescriptions.        Dose/Directions    amitriptyline 10 MG tablet   Commonly known as:  ELAVIL   This may have changed:  additional instructions   Used for:  Chronic migraine without aura without status migrainosus, not intractable   Changed by:  Faiza Martinez MD        Dose:  10 mg   Take 1 tablet (10 mg) by mouth At Bedtime Take 2 tablets   Quantity:  60 tablet   Refills:  1       ondansetron 4 MG ODT tab   Commonly known as:  ZOFRAN-ODT   This may have changed:  Another medication with the same name was removed. Continue taking this medication, and follow the directions you see here.   Used for:  Migraine without status migrainosus, not intractable, unspecified migraine type   Changed by:  Nicole  Ellyn Walker MD        Dose:  4-8 mg   Take 1-2 tablets (4-8 mg) by mouth every 12 hours as needed for nausea   Quantity:  60 tablet   Refills:  1       rizatriptan 5 MG ODT tab   Commonly known as:  MAXALT-MLT   This may have changed:  Another medication with the same name was removed. Continue taking this medication, and follow the directions you see here.   Used for:  Migraine without status migrainosus, not intractable, unspecified migraine type   Changed by:  Fara Varner APRN CNP        TAKE 1-2 TABLETS (5-10 MG) BY MOUTH AT ONSET OF HEADACHE FOR MIGRAINE (MAX 30 MG IN 24 HOURS)   Quantity:  20 tablet   Refills:  6         Stop taking these medicines if you haven't already. Please contact your care team if you have questions.     acetaminophen-codeine 300-30 MG per tablet   Commonly known as:  TYLENOL #3   Stopped by:  Jerad Garnett MD           citalopram 20 MG tablet   Commonly known as:  celeXA   Stopped by:  Jerad Garnett MD           citalopram 40 MG tablet   Commonly known as:  celeXA   Stopped by:  Jerad Garnett MD           cyclobenzaprine 10 MG tablet   Commonly known as:  FLEXERIL   Stopped by:  Jerad Garnett MD           diazepam 5 MG tablet   Commonly known as:  VALIUM   Stopped by:  Jerad Garnett MD           fentaNYL 25 mcg/hr 72 hr patch   Commonly known as:  DURAGESIC   Stopped by:  Jerad Granett MD           fluocinonide 0.05 % cream   Commonly known as:  LIDEX   Stopped by:  Jerad Garnett MD           FLUTICASONE FUROATE NA   Stopped by:  Jerad Garnett MD           furosemide 40 MG tablet   Commonly known as:  LASIX   Stopped by:  Jerad Garnett MD           HYDROmorphone 4 MG tablet   Commonly known as:  DILAUDID   Stopped by:  Jerad Garnett MD           lamoTRIgine 25 MG tablet   Commonly known as:  LaMICtal   Stopped by:  Jerad Garnett MD           Melatonin Gummies 2.5 MG Chew    Stopped by:  Jerad Garnett MD           meloxicam 15 MG tablet   Commonly known as:  MOBIC   Stopped by:  Jerad Garnett MD           methylPREDNISolone 4 MG tablet   Commonly known as:  MEDROL DOSEPAK   Stopped by:  Jerad Garnett MD           metoprolol 100 MG 24 hr tablet   Commonly known as:  TOPROL-XL   Stopped by:  Jerad Garnett MD           midodrine 5 MG tablet   Commonly known as:  PROAMATINE   Stopped by:  Jerad Garnett MD           MULTI-VITAMINS Tabs   Stopped by:  Jerad Garnett MD           NATURE-THROID 81.25 MG Tabs   Generic drug:  Thyroid   Stopped by:  Jerad Garnett MD           ondansetron 4 MG tablet   Commonly known as:  ZOFRAN   Stopped by:  Jerad Garnett MD           phentermine 37.5 MG tablet   Commonly known as:  ADIPEX-P   Stopped by:  Jerad Garnett MD           thyroid 15 MG Tabs tablet   Stopped by:  Jerad Garnett MD           thyroid 60 MG tablet   Commonly known as:  ARMOUR   Stopped by:  Jerad Garnett MD           traMADol 50 MG tablet   Commonly known as:  ULTRAM   Stopped by:  Jerad Garnett MD           vitamin D 29526 UNIT capsule   Commonly known as:  ERGOCALCIFEROL   Stopped by:  Jerad Garnett MD           zonisamide 100 MG capsule   Commonly known as:  ZONEGRAN   Stopped by:  Jerad Garnett MD                Where to get your medicines      These medications were sent to [place] [id number] [place] [facility] [place] - [address]  [address] [place] [address]     Phone:  [phone]     amitriptyline 10 MG tablet                Primary Care Provider Office Phone # Fax #    Ellyn Rivera [phone]       [address] [place] [address] [address] [place] [address]        Equal Access to Services     CHRISTINA GUY AH: Ashutosh romoo Sonegrito, waaxda luqadaha, qaybta kaalmada adeegyada, odette dykes . [facility]  [phone].    ATENCIÓN: Si louis samuel, tiene a sweeney disposición servicios gratuitos de asistencia lingüística. Isaías carr [phone].    We comply with applicable federal civil rights laws and [place] laws. We do not discriminate on the basis of race, color, national origin, age, disability sex, sexual orientation or gender identity.            Thank you!     Thank you for choosing [facility]  for your care. Our goal is always to provide you with excellent care. Hearing back from our patients is one way we can continue to improve our services. Please take a few minutes to complete the written survey that you may receive in the mail after your visit with us. Thank you!             Your Updated Medication List - Protect others around you: Learn how to safely use, store and throw away your medicines at www.disposemymeds.org.          This list is accurate as of: 9/12/17  3:43 PM.  Always use your most recent med list.                   Brand Name Dispense Instructions for use Diagnosis    ADVAIR DISKUS 250-50 MCG/DOSE diskus inhaler   Generic drug:  fluticasone-salmeterol           albuterol 108 (90 BASE) MCG/ACT Inhaler    PROAIR HFA/PROVENTIL HFA/VENTOLIN HFA     Inhale 2 puffs into the lungs        amitriptyline 10 MG tablet    ELAVIL    60 tablet    Take 1 tablet (10 mg) by mouth At Bedtime Take 2 tablets    Chronic migraine without aura without status migrainosus, not intractable       buprenorphine 10 MCG/HR WK patch    BUTRANS    4 patch    Place 1 patch onto the skin every 7 days    Postlaminectomy syndrome of lumbar region       cetirizine 10 MG tablet    zyrTEC          COMPRESSION STOCKINGS     3 each    1 each daily 20-30 mmHg Thigh Length measure and fit, color and style per patient preference. Doff n jose per need.    Orthostatic hypotension       doxycycline 100 MG capsule    VIBRAMYCIN    42 capsule    Take 1 capsule (100 mg) by mouth 2 times daily for 21 days    Chronic  pansinusitis       EPINEPHrine 0.3 MG/0.3ML injection 2-pack    EPIPEN/ADRENACLICK/or ANY BX GENERIC EQUIV     Inject 0.3 mg into the muscle        etodolac 400 MG tablet    LODINE     Take 400 mg by mouth        fluticasone 50 MCG/ACT spray    FLONASE     2 sprays        ketamine (KETALAR) 5%-gabapentin (NEURONTIN) 8%-lidocaine 2.5% 5%-8% Gel topical PLO cream     30 g    Apply 1 g topically 3 times daily as needed    Postlaminectomy syndrome of lumbosacral region, Postlaminectomy syndrome of lumbar region       levothyroxine 25 mcg/mL Susp    SYNTHROID     25 mcg every morning        metoclopramide 5 MG tablet    REGLAN    20 tablet    Take 1 tablet (5 mg) by mouth every 6 hours as needed    Intractable chronic migraine without aura and with status migrainosus       MULTIVITAMIN PO      Take by mouth daily        omeprazole 20 MG CR capsule    priLOSEC     20 mg 2 times daily        ondansetron 4 MG ODT tab    ZOFRAN-ODT    60 tablet    Take 1-2 tablets (4-8 mg) by mouth every 12 hours as needed for nausea    Migraine without status migrainosus, not intractable, unspecified migraine type       polyethylene glycol powder    MIRALAX/GLYCOLAX          PROBIOTIC DAILY PO      Take by mouth 2 times daily        rizatriptan 5 MG ODT tab    MAXALT-MLT    20 tablet    TAKE 1-2 TABLETS (5-10 MG) BY MOUTH AT ONSET OF HEADACHE FOR MIGRAINE (MAX 30 MG IN 24 HOURS)    Migraine without status migrainosus, not intractable, unspecified migraine type       senna 8.6 MG tablet    SENOKOT     Take 17.2-34.4 mg by mouth        SUMAtriptan 6 MG/0.5ML pen injector kit    IMITREX STATDOSE    2 kit    Inject 0.5 mLs (6 mg) Subcutaneous at onset of headache for migraine (may repeat once after 2 hrs) May repeat in 1 hour. Max 12 mg/24 hours.    Chronic migraine without aura without status migrainosus, not intractable       tiZANidine 4 MG tablet    ZANAFLEX    90 tablet    Take 2 tablets (8 mg) by mouth 3 times daily as needed for muscle  spasms    Post laminectomy syndrome

## 2017-09-12 NOTE — NURSING NOTE
"Oncology Rooming Note    September 12, 2017 9:16 AM   Ada Welch is a 46 year old female who presents for:    Chief Complaint   Patient presents with     Oncology Clinic Visit     New for Eosinophilic Esophagitis      Initial Vitals: /88  Pulse 87  Temp 98.1  F (36.7  C) (Oral)  Resp 18  Wt 92.7 kg (204 lb 5.9 oz)  SpO2 90%  BMI 32.99 kg/m2 Estimated body mass index is 32.99 kg/(m^2) as calculated from the following:    Height as of 8/30/17: 1.676 m (5' 6\").    Weight as of this encounter: 92.7 kg (204 lb 5.9 oz). Body surface area is 2.08 meters squared.  Severe Pain (7) Comment: Data Unavailable   No LMP recorded.  Allergies reviewed: Yes  Medications reviewed: Yes    Medications: Medication refills not needed today.  Pharmacy name entered into [facility]:    [facility] [address]  [place] [address] [place] [address]    Clinical concerns: discuss  Roland  was notified.    10 minutes for nursing intake (face to face time)     Zuleyka Santillan MA              "

## 2017-09-12 NOTE — LETTER
"9/12/2017       RE: Ada Welch  [address] [place] [address]     Dear Colleague,    Thank you for referring your patient, Ada Welch, to the [facility] at [facility]. Please see a copy of my visit note below.    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  Regarding the patient's possible eosinophilic esophagitis, my concern is that she may have significant reflux to hiatal hernia and due to the pattern of eosinophils in the biopsies therefore at this moment will plan on doing a gastric emptying study is seen says that she has prandial nausea and abdominal pain which could be related to gastroparesis which could make reflux worse leading to high eosinophils. We'll also obtain an esophagram to crudely evaluate the amount of reflux.  The patient does complain of \"IBS\" during which she has a lot of bloating and abdominal pain with diarrhea sometimes constipation and also gastroparesis pain which she attributes to her Tom-Danlos syndrome. We'll have the patient be further evaluated and general GI clinic for the symptoms.    PLAN:  - Gastric emptying study    - Esophagram with video swallow study    - Referral to general GI clinic    - Pending the results will plan repeating the upper endoscopy for further evaluation.    Thank you for this consultation.  It was a pleasure to participate in the care of this patient; please contact us with any further questions.  A " total of 30 minutes was spent with this patient, >50% of which was counseling regarding the above delineated issues.    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 The patient complains of intermittent symptoms of dysphagia, she has not found a consistent pattern of the third trouble swallowing sometimes it sporadic. She does not feel consistent reflux. But sometimes 2 filling she eats something supportive presenting of the food will come back up. She does complain of intermittent diarrhea and sometimes constipation and does not know her has identified any specific triggers. She does complain of significant nausea after eating as well as occasional pain in the epigastric area after eating.    ROS:  10pt ROS performed and otherwise negative.    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     Chronic sinusitis 00/2007    Diagnosed with chronic pansinusitis 2016     Chronic tonsillitis 1980    Had tonsils removed 02/1981, rheumatic fever     Gastroesophageal reflux disease 3/1/2017    I have  large hiatal hernia     Hernia, abdominal 10/2016    Hiatal Hernia     Hiatal hernia 2016    Have adeline hiatel hernia     Hoarseness Unsure    It comes and goes     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     Migraines 1/1/2007    Unsure of exact date     Nasal polyps 2013    Were revoved 03/2017, benign     Pneumonia Unsure    Have had pneumonia several times     Swelling, mass, or lump in head and neck 08/2016    Lymph node has been growing for last year     Thyroid disease 01/01/2008     Patient Active Problem List   Diagnosis     Pain syndrome, chronic     s/p TLIF L4-5,L5-S1 with solid arthrodesis     Tom-Danlos syndrome     Postlaminectomy syndrome, lumbar region     Headache     Cervicalgia     Slow transit constipation     Urinary urgency     Nocturia     Cardiac device in situ- [employer] Implanted Loop Recorder (ILR)     PREVIOUS ENDOSCOPY:  Endoscopy and colonoscopy done in March 2017 under Mac anesthesia, colonoscopy found one small hyperplastic polyp. An upper endoscopy showed ringed esophagus with distal biopsies showing a high number of eosinophils in the mid esophageal biopsies showed minimal eosinophils     PERTINENT MEDICATIONS:  Current Outpatient Prescriptions   Medication     amitriptyline (ELAVIL) 10 MG tablet     rizatriptan (MAXALT-MLT) 5 MG ODT tab     tiZANidine (ZANAFLEX) 4 MG tablet     buprenorphine (BUTRANS) 10 MCG/HR WK patch     EPINEPHrine (EPIPEN/ADRENACLICK/OR ANY BX GENERIC EQUIV) 0.3 MG/0.3ML injection 2-pack     albuterol (PROAIR HFA/PROVENTIL HFA/VENTOLIN HFA) 108 (90 BASE) MCG/ACT Inhaler     fluticasone (FLONASE) 50 MCG/ACT spray     etodolac (LODINE) 400 MG tablet     COMPRESSION STOCKINGS     doxycycline (VIBRAMYCIN) 100 MG capsule     SUMAtriptan (IMITREX STATDOSE) 6 MG/0.5ML pen injector kit     ketamine, KETALAR, 5%-gabapentin, NEURONTIN, 8%-lidocaine 2.5% 5%-8% GEL topical PLO cream     levothyroxine (SYNTHROID) 25 mcg/mL SUSP      omeprazole (PRILOSEC) 20 MG CR capsule     fluticasone-salmeterol (ADVAIR DISKUS) 250-50 MCG/DOSE diskus inhaler     cetirizine (ZYRTEC) 10 MG tablet     polyethylene glycol (MIRALAX/GLYCOLAX) powder     Multiple Vitamins-Minerals (MULTIVITAMIN PO)     Probiotic Product (PROBIOTIC DAILY PO)     senna (SENOKOT) 8.6 MG tablet     metoclopramide (REGLAN) 5 MG tablet     [DISCONTINUED] amitriptyline (ELAVIL) 10 MG tablet     ondansetron (ZOFRAN-ODT) 4 MG ODT tab     No current facility-administered medications for this visit.      Medications reviewed with patient today, see Medication List/Assessment for details.  No other NSAID/anticoagulation reported by patient.  No other OTC/herbal/supplements reported by patient.    SOCIAL HISTORY:  Social History     Social History     Marital status:      Spouse name: N/A     Number of children: N/A     Years of education: N/A     Occupational History     Not on file.     Social History Main Topics     Smoking status: Former Smoker     Packs/day: 0.20     Years: 10.00     Types: Cigarettes     Start date: 1/1/1991     Quit date: 12/1/2013     Smokeless tobacco: Never Used      Comment: I smoked on and off during specified dates.     Alcohol use No     Drug use: No     Sexual activity: Yes     Partners: Male     Birth control/ protection: Female Surgical     Other Topics Concern     Not on file     Social History Narrative    5 kids: 24, 23, 19, 13, 5 yo     works at [employer] for disability           FAMILY HISTORY:  Family History   Problem Relation Age of Onset     Connective Tissue Disorder Mother      eds     Connective Tissue Disorder Sister      eds     CANCER Father      Spinal tumor grew into spinal cord, went in brain     Migraines Father      DIABETES Maternal Grandmother      Elderly diabetes     HEART DISEASE Maternal Grandmother      Hypertension Maternal Grandmother      DIABETES Paternal Grandmother      Elderly diabetes      DIABETES Paternal Grandfather      Elderly diabetes     Asthma Sister      Pediatric Asthma     Migraines Sister      Asthma Son      Pediatric Asthma     Thyroid Disease Sister      ,     Migraines Sister      No colon/panc/other GI CA, no other HNPCC-related Alex.  No IBD/celiac, no AI/liver disease.    PHYSICAL EXAMINATION:  /88  Pulse 87  Temp 98.1  F (36.7  C) (Oral)  Resp 18  Wt 92.7 kg (204 lb 5.9 oz)  SpO2 90%  BMI 32.99 kg/m2  Wt:   Wt Readings from Last 2 Encounters:   09/12/17 92.7 kg (204 lb 5.9 oz)   08/30/17 92.5 kg (204 lb)      Body mass index is 32.99 kg/(m^2).   Constitutional: cooperative, pleasant, not dyspneic/diaphoretic, no acute distress  Eyes: Sclera anicteric/injected  Ears/nose/mouth/throat: Normal oropharynx without ulcers or exudate, mucus membranes moist, hearing intact  Neck: supple, thyroid normal size  CV: No edema  Respiratory: Unlabored breathing  Lymph: No axillary, submandibular, supraclavicular or inguinal lymphadenopathy  Abd: Nondistended  Skin: warm, perfused, no jaundice  Neuro: AAO x 3, No asterixis  Psych: Normal affect  MSK: Normal gait, currently with a neck collar    PERTINENT STUDIES:  reviewed and compared with the patient and the patient's     Again, thank you for allowing me to participate in the care of your patient.      Sincerely,    Jerad Lyons MD

## 2017-09-13 ENCOUNTER — TELEPHONE (OUTPATIENT)
Dept: ANESTHESIOLOGY | Facility: CLINIC | Age: 47
End: 2017-09-13

## 2017-09-13 DIAGNOSIS — G43.711 INTRACTABLE CHRONIC MIGRAINE WITHOUT AURA AND WITH STATUS MIGRAINOSUS: ICD-10-CM

## 2017-09-13 DIAGNOSIS — G43.109 MIGRAINE WITH AURA AND WITHOUT STATUS MIGRAINOSUS, NOT INTRACTABLE: Primary | ICD-10-CM

## 2017-09-13 RX ORDER — TRAMADOL HYDROCHLORIDE 50 MG/1
50 TABLET ORAL EVERY 6 HOURS PRN
Qty: 15 TABLET | Refills: 0 | Status: ON HOLD | OUTPATIENT
Start: 2017-09-13 | End: 2017-10-20

## 2017-09-13 RX ORDER — TRAMADOL HYDROCHLORIDE 50 MG/1
50 TABLET ORAL EVERY 6 HOURS PRN
Qty: 20 TABLET | Refills: 0 | Status: SHIPPED | OUTPATIENT
Start: 2017-09-13 | End: 2017-09-13

## 2017-09-13 NOTE — TELEPHONE ENCOUNTER
Phone call to pt regarding pain.  Verbal orders given by Dr. Martinez for tramadol 50mg q6h prn quantity 15 tablets.  Pt scheduled for follow up with Dr. Martinez 10/27/17. Prescription called into [facility], per pts request.      Jessica De Souza, RN, BSN

## 2017-09-14 ENCOUNTER — MYC MEDICAL ADVICE (OUTPATIENT)
Dept: INTERNAL MEDICINE | Facility: CLINIC | Age: 47
End: 2017-09-14

## 2017-09-14 RX ORDER — METOCLOPRAMIDE 5 MG/1
5 TABLET ORAL EVERY 6 HOURS PRN
Qty: 20 TABLET | Refills: 1 | Status: SHIPPED | OUTPATIENT
Start: 2017-09-14 | End: 2018-05-01

## 2017-09-15 DIAGNOSIS — G43.909 MIGRAINE WITHOUT STATUS MIGRAINOSUS, NOT INTRACTABLE, UNSPECIFIED MIGRAINE TYPE: ICD-10-CM

## 2017-09-15 RX ORDER — ONDANSETRON 4 MG/1
4-8 TABLET, ORALLY DISINTEGRATING ORAL EVERY 12 HOURS PRN
Qty: 60 TABLET | Refills: 1 | Status: SHIPPED | OUTPATIENT
Start: 2017-09-15 | End: 2021-10-15

## 2017-09-15 NOTE — TELEPHONE ENCOUNTER
Zofran  Last Written Prescription Date:  7/17/17  Last Fill Quantity: 60,   # refills: 1  Last Office Visit : 7/17/17  Future Office visit:  No future appt    Routing refill request to clinic RN for review/approval because: recent GI consult.   Unsure if med is to continue and/or prescribed by Dr. Rivera.

## 2017-09-19 NOTE — TELEPHONE ENCOUNTER
Morgan Eaton,  It looks like the refills were already processed and I do not see any records yet from Dr. Sofia, but as you know, there is sometimes a delay.  Thanks,  Ellyn Rivera MD  Internal Medicine

## 2017-09-21 ENCOUNTER — PRE VISIT (OUTPATIENT)
Dept: GASTROENTEROLOGY | Facility: CLINIC | Age: 47
End: 2017-09-21

## 2017-09-21 ENCOUNTER — OFFICE VISIT (OUTPATIENT)
Dept: INTERNAL MEDICINE | Facility: CLINIC | Age: 47
End: 2017-09-21

## 2017-09-21 VITALS
WEIGHT: 202.5 LBS | SYSTOLIC BLOOD PRESSURE: 133 MMHG | RESPIRATION RATE: 16 BRPM | DIASTOLIC BLOOD PRESSURE: 88 MMHG | BODY MASS INDEX: 32.68 KG/M2 | HEART RATE: 68 BPM

## 2017-09-21 DIAGNOSIS — G43.709 CHRONIC MIGRAINE WITHOUT AURA WITHOUT STATUS MIGRAINOSUS, NOT INTRACTABLE: ICD-10-CM

## 2017-09-21 DIAGNOSIS — K44.9 HIATAL HERNIA: Primary | ICD-10-CM

## 2017-09-21 DIAGNOSIS — D47.09 MAST CELL DISORDER: ICD-10-CM

## 2017-09-21 RX ORDER — FAMOTIDINE 20 MG/1
20 TABLET, FILM COATED ORAL AT BEDTIME
Qty: 90 TABLET | Refills: 1 | Status: SHIPPED | OUTPATIENT
Start: 2017-09-21 | End: 2019-09-25

## 2017-09-21 RX ORDER — AMITRIPTYLINE HYDROCHLORIDE 10 MG/1
30 TABLET ORAL AT BEDTIME
Qty: 90 TABLET | Refills: 2 | Status: SHIPPED | OUTPATIENT
Start: 2017-09-21 | End: 2018-01-09

## 2017-09-21 ASSESSMENT — ANXIETY QUESTIONNAIRES
GAD7 TOTAL SCORE: 0
3. WORRYING TOO MUCH ABOUT DIFFERENT THINGS: NOT AT ALL
7. FEELING AFRAID AS IF SOMETHING AWFUL MIGHT HAPPEN: NOT AT ALL
4. TROUBLE RELAXING: NOT AT ALL
7. FEELING AFRAID AS IF SOMETHING AWFUL MIGHT HAPPEN: NOT AT ALL
2. NOT BEING ABLE TO STOP OR CONTROL WORRYING: NOT AT ALL
5. BEING SO RESTLESS THAT IT IS HARD TO SIT STILL: NOT AT ALL
GAD7 TOTAL SCORE: 0
6. BECOMING EASILY ANNOYED OR IRRITABLE: NOT AT ALL
GAD7 TOTAL SCORE: 0
1. FEELING NERVOUS, ANXIOUS, OR ON EDGE: NOT AT ALL

## 2017-09-21 ASSESSMENT — PATIENT HEALTH QUESTIONNAIRE - PHQ9
SUM OF ALL RESPONSES TO PHQ QUESTIONS 1-9: 0
SUM OF ALL RESPONSES TO PHQ QUESTIONS 1-9: 0
10. IF YOU CHECKED OFF ANY PROBLEMS, HOW DIFFICULT HAVE THESE PROBLEMS MADE IT FOR YOU TO DO YOUR WORK, TAKE CARE OF THINGS AT HOME, OR GET ALONG WITH OTHER PEOPLE: NOT DIFFICULT AT ALL

## 2017-09-21 ASSESSMENT — PAIN SCALES - GENERAL: PAINLEVEL: MODERATE PAIN (5)

## 2017-09-21 NOTE — PROGRESS NOTES
Ms. Welch is a 46 year old female here for follow up.    History of Present Illness:  Ada is a pleasant 46-year-old woman with a past medical history notable for Ehler's Danlos syndrome type III, migraines chronic back pain, tachycardia, urinary incontinence who recently moved to the [facility] from [facility]. She has several chronic health issues and has seen several specialists in the interim.    She apparently traveled to [facility] to see a neurosurgical specialist  (Dr. 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  I have no records but she reports that she was found to have C3-7 disc degen, C5 syrinx, chairi R, stenosis, cranial cervical instability, C1-2 calcification.  He wanted her to wear a neck brace and see a TMJ specialist in [place]. She will f/u in hematology for an elevated Factor 8 level.  She was seen in GI clinic for hiatal hernia, eosinophils, esophageal rings.  They recommended GES, esophagram and f/u in regular GI clinic for IBS.  She still endorses. pain after eating with heavier foods, nauseated  In July she was hospitalized for intractable HA which responded only to DHA.  She had an LP and MRI which were normal.  She was seen in f/u in Neuro clinic.  The increased amitriptyline to 20 mg.  She started PT was  [facility], [place], craniosacral, myofacial release, isometrics.  She also saw cardiology and ENT for sinusitis.    Previous Documentation:  Ehler's Danlos syndrome: Type 3/hypermobility type. 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 He reports having a Beighton score of 7-8 out of 9. She reports having genetic testing done, which was questionable for the MYLK Gene. She follows with a rheumatologist who is an EDS specialist in [place]. He is apparently concerned about some sort of Chiari malformation in the context of her head and neck pain and will be flying to [place] to visit a special neurosurgeon Dr. Jesus Alberto Sofia. She also struggled with low back pain for many years. She underwent L4 to S1 fusion in 2013 followed by removal of instrumentation, which did not improve her symptoms. She met with Dr. Martinez in the pain clinic and was taken off high doses of Dilaudid and started on Suboxone, which has dramatically improved her symptoms.  She continues to work with physical therapy twice a week as well as other complementary therapies.    Headaches:She does have a history of migraine headaches, as well as a headache which started last week and has remained fairly constant. She reports it started in the back of her head bilaterally approximately 10 days ago. It has waxed and waned in intensity since this time.She has used only over-the-counter medications thus far. She has some associated nausea. She has no other neurologic changes acutely. He denies any recent changes in medications sleep stress activity or caffeine.    Other issues that she struggles with include chronic tachycardia, for which she has a loop recorder implanted. She describes asthma as well as pulmonary nodules. She has a hiatal hernia as well as chronic sinus issues. She reports some autonomic issues. She apparently has a enlarged lymph node in the cervical region, which was biopsied and found to be benign, under active surveillance. She has chronic urge incontinence as well as incomplete bladder emptying.      A full 10-pt Review of Systems was performed, verified and is negative except as documented in the HPI.  All health questionnaires were reviewed, verified and relevant information documented above.    Past Medical History:  See above    Past Surgical History:  Past Surgical History:   Procedure Laterality Date     ADENOIDECTOMY  1981     AS REPAIR OF NASAL SEPTUM       NASAL/SINUS POLYPECTOMY  2017    Polyps were benign     TONSILLECTOMY       TONSILLECTOMY  1981     TUBAL LIGATION         Active  Meds:  Current Outpatient Prescriptions   Medication     ondansetron (ZOFRAN-ODT) 4 MG ODT tab     metoclopramide (REGLAN) 5 MG tablet     traMADol (ULTRAM) 50 MG tablet     amitriptyline (ELAVIL) 10 MG tablet     rizatriptan (MAXALT-MLT) 5 MG ODT tab     tiZANidine (ZANAFLEX) 4 MG tablet     buprenorphine (BUTRANS) 10 MCG/HR WK patch     EPINEPHrine (EPIPEN/ADRENACLICK/OR ANY BX GENERIC EQUIV) 0.3 MG/0.3ML injection 2-pack     albuterol (PROAIR HFA/PROVENTIL HFA/VENTOLIN HFA) 108 (90 BASE) MCG/ACT Inhaler     fluticasone (FLONASE) 50 MCG/ACT spray     etodolac (LODINE) 400 MG tablet     senna (SENOKOT) 8.6 MG tablet     COMPRESSION STOCKINGS     SUMAtriptan (IMITREX STATDOSE) 6 MG/0.5ML pen injector kit     ketamine, KETALAR, 5%-gabapentin, NEURONTIN, 8%-lidocaine 2.5% 5%-8% GEL topical PLO cream     levothyroxine (SYNTHROID) 25 mcg/mL SUSP     omeprazole (PRILOSEC) 20 MG CR capsule     fluticasone-salmeterol (ADVAIR DISKUS) 250-50 MCG/DOSE diskus inhaler     cetirizine (ZYRTEC) 10 MG tablet     polyethylene glycol (MIRALAX/GLYCOLAX) powder     Multiple Vitamins-Minerals (MULTIVITAMIN PO)     Probiotic Product (PROBIOTIC DAILY PO)     No current facility-administered medications for this visit.         Allergies:  Bee venom; Chicken-derived products (egg); Gabapentin; Gluten meal; Lactose; Milk protein extract; Topiramate; Wheat; and Wheat bran    Family History:  family history includes Asthma in her sister and son; CANCER in her father; Connective Tissue Disorder in her mother and sister; DIABETES in her maternal grandmother, paternal grandfather, and paternal grandmother; HEART DISEASE in her maternal grandmother; Hypertension in her maternal grandmother; Migraines in her father, sister, and sister; Thyroid Disease in her sister.    Social History:  Social History   Substance Use Topics     Smoking status: Former Smoker     Packs/day: 0.20     Years: 10.00     Types: Cigarettes     Start date: 1/1/1991     Quit  date: 12/1/2013     Smokeless tobacco: Never Used      Comment: I smoked on and off during specified dates.     Alcohol use No   she has 5 children, all born by vaginal deliveries  She is ,  She is not currently working    Physical Exam:  Vitals: /88  Pulse 68  Resp 16  Wt 91.9 kg (202 lb 8 oz)  BMI 32.68 kg/m2  Constitutional: Alert, oriented, pleasant, no acute distress  Head: Normocephalic, atraumatic  Eyes: Extra-ocular movements intact, pupils equally round and reactivebilaterally, no scleral icterus  ENT: Oropharynx clear, moist mucus membranes, good dentition  Neck: Wearing cervical collar, supple, tender in occipital region bilat  Cardiovascular: Regular rate and rhythm, no murmurs, rubs or gallops, peripheral pulses full/symmetric  Respiratory: Good air movement bilaterally, lungs clear, no wheezes/rales/rhonchi  Musculoskeletal: No edema, normal muscle tone, normal gait, no head/neck  Tenderness, ROM neck intact  Neurologic: Alert and oriented, cranial nerves 2-12 intact, nonfocal exam  Psychiatric: normal mentation, affect and mood          Assessment and Plan:  Ada was seen today for headache.  She has a very complex history and we have not received all of her recent records. We discussed a few medicine changes today but that I will need to fully review outside records to fully understand history.    Diagnoses and all orders for this visit:    Hiatal hernia: Add H2 for potential mast cell do  -     famotidine (PEPCID) 20 MG tablet; Take 1 tablet (20 mg) by mouth At Bedtime  -     omeprazole (PRILOSEC) 20 MG CR capsule; Take 1 capsule (20 mg) by mouth daily    Chronic migraine without aura without status migrainosus, not intractable: Component of tension HA, recommended occipital blocks.  Not sure cervical collar is helping but was recommended by neurosurgery.    -     amitriptyline (ELAVIL) 10 MG tablet; Take 3 tablets (30 mg) by mouth At Bedtime  -     Could consider topamax--though  need to clarify allergy alert  -       consider coQ10   Consider medical cannabis    Melida@[employer],   PEG = 7+8+8    Mast cell disorder  -     famotidine (PEPCID) 20 MG tablet; Take 1 tablet (20 mg) by mouth At Bedtime        #Routine Health Maintenence:*deferred today  Immunizations (zoster, pneumovax, flu, Tdap, Hep A/B):   There is no immunization history on file for this patient.  Lipids: No results for input(s): CHOL, HDL, LDL, TRIG, CHOLHDLRATIO in the last [id number] hours.  PSA (50-75 yrs): No results found for: PSA   AAA Screening (65-75 yrs):  Lung Ca Screening (>30 pk age 55-79 or >20 py age 50-79 + RF):  Colonoscopy (50-75 yrs):  Dexa (>65W or 70M yrs):  Mammogram (40-75 yrs):  Pap (21-65 yrs):  Pelvic/Breast:  GC/Chlam (<25 yrs):  HIV/HCV if risk factors:  Safety/Lifestyle:  Tob/EtOH:  Depression:  Advanced Directive:    Return to clinic: 2-3 months p.r.n.    Ellyn Rivera MD  Internal Medicine    45 min spent face to face, of which >50% time spent on counseling/coordinating care exclusive of any procedure time      Answers for HPI/ROS submitted by the patient on 7/10/2017   Annual Exam:  General Symptoms: Yes  Skin Symptoms: Yes  HENT Symptoms: Yes  EYE SYMPTOMS: Yes  HEART SYMPTOMS: Yes  LUNG SYMPTOMS: Yes  INTESTINAL SYMPTOMS: Yes  URINARY SYMPTOMS: Yes  GYNECOLOGIC SYMPTOMS: No  BREAST SYMPTOMS: No  SKELETAL SYMPTOMS: Yes  BLOOD SYMPTOMS: Yes  NERVOUS SYSTEM SYMPTOMS: Yes  MENTAL HEALTH SYMPTOMS: No  Fever: No  Loss of appetite: Yes  Weight loss: No  Weight gain: No  Fatigue: Yes  Night sweats: No  Chills: Yes  Increased stress: No  Excessive hunger: No  Excessive thirst: No  Feeling hot or cold when others believe the temperature is normal: Yes  Loss of height: No  Post-operative complications: Yes  Surgical site pain: Yes  Hallucinations: No  Change in or Loss of Energy: No  Hyperactivity: No  Confusion: Yes  Changes in hair: Yes  Changes in moles/birth marks: Yes  Itching: Yes  Rashes:  No  Changes in nails: No  Acne: No  Hair in places you don't want it: No  Change in facial hair: No  Warts: No  Non-healing sores: Yes  Scarring: Yes  Flaking of skin: No  Color changes of hands/feet in cold : Yes  Sun sensitivity: No  Skin thickening: No  Ear pain: No  Ear discharge: No  Hearing loss: No  Tinnitus: Yes  Nosebleeds: No  Congestion: Yes  Sinus pain: Yes  Trouble swallowing: Yes   Voice hoarseness: Yes  Mouth sores: No  Sore throat: No  Tooth pain: Yes  Gum tenderness: No  Bleeding gums: No  Change in taste: No  Change in sense of smell: No  Dry mouth: Yes  Hearing aid used: No  Neck lump: Yes  Eye pain: Yes  Vision loss: No  Dry eyes: Yes  Watery eyes: No  Eye bulging: No  Double vision: Yes  Flashing of lights: Yes  Spots: Yes  Floaters: Yes  Redness: Yes  Crossed eyes: No  Tunnel Vision: Yes  Yellowing of eyes: No  Eye irritation: Yes  Cough: Yes  Sputum or phlegm: Yes  Coughing up blood: No  Difficulty breating or shortness of breath: Yes  Snoring: Yes  Wheezing: Yes  Difficulty breathing on exertion: Yes  Respiratory pain: Yes  Nighttime Cough: No  Difficulty breathing when lying flat: Yes  Chest pain or pressure: Yes  Fast or irregular heartbeat: Yes  Pain in legs with walking: Yes  Swelling in feet or ankles: Yes  Trouble breathing while lying down: Yes  Fingers or Toes appear blue: Yes  High blood pressure: No  Low blood pressure: No  Fainting: No  Murmurs: No  Chest pain on exertion: No  Chest pain at rest: No  Cramping pain in leg during exercise: Yes  Pacemaker: No  Varicose veins: No  Edema or swelling: Yes  Fast heart beat: Yes  Wake up at night with shortness of breath: No  Heart flutters: No  Light-headedness: Yes  Exercise intolerance: Yes  Heart burn or indigestion: No  Nausea: Yes  Vomiting: Yes  Abdominal pain: Yes  Bloating: Yes  Constipation: Yes  Diarrhea: Yes  Blood in stool: Yes  Black stools: No  Rectal or Anal pain: No  Fecal incontinence: No  Rectal bleeding: Yes  Yellowing  of skin or eyes: No  Vomit with blood: No  Change in stools: No  Hemorrhoids: Yes  Trouble holding urine or incontinence: Yes  Pain or burning: Yes  Trouble starting or stopping: Yes  Increased frequency of urination: Yes  Blood in urine: No  Decreased frequency of urination: No  Frequent nighttime urination: Yes  Flank pain: Yes  Difficulty emptying bladder: Yes  Back pain: Yes  Muscle aches: Yes  Neck pain: Yes  Swollen joints: Yes  Joint pain: Yes  Bone pain: Yes  Muscle cramps: Yes  Muscle weakness: Yes  Joint stiffness: Yes  Bone fracture: No  Anemia: No  Swollen glands: Yes  Easy bleeding or bruising: Yes  Trouble with coordination: Yes  Dizziness or trouble with balance: Yes  Fainting or black-out spells: Yes  Memory loss: Yes  Headache: Yes  Seizures: No  Speech problems: No  Tingling: Yes  Tremor: No  Weakness: Yes  Difficulty walking: Yes  Paralysis: No  Numbness: Yes  Frequency of exercise:: 4-5 days/week  Duration of exercise:: 15-30 minutes

## 2017-09-21 NOTE — TELEPHONE ENCOUNTER
1.  Date/reason for appt: 10/04/17 1PM IBS   2.  Referring provider: Luan REILLY   3.  Call to patient (Yes / No - short description): Yes, called and spoke with pt re. Outside recs. There are recs at [facility]   4.  Previous care at / records requested from:    [facility] Luan REILLY -- Recs are in Epic /Images in PACS  Email: ([email or web address])    [facility] REILLY -    [place]

## 2017-09-21 NOTE — NURSING NOTE
Chief Complaint   Patient presents with     Headache     patient states she is having on-going ha and is wondering about results     TIFFANIE STONE at 11:41 AM on 9/21/2017.

## 2017-09-21 NOTE — PATIENT INSTRUCTIONS
[facility]: [phone]     [facility] Medication Refill Request Information:  * Please contact your pharmacy regarding ANY request for medication refills.  ** [facility] Prescription Fax = [phone]  * Please allow 3 business days for routine medication refills.  * Please allow 5 business days for controlled substance medication refills.     [facility] Test Result notification information:  *You will be notified with in 7-10 days of your appointment day regarding the results of your test.  If you are on MyChart you will be notified as soon as the provider has reviewed the results and signed off on them.        Try increasing amitriptyline to 30 mg at bedtime.    Stop reglan one week prior to gastric emptying study.    We can try to start topamax for headache prevention.    Start prilosec one a day and we will add in an H2 blocker like pepcid at night for mast cells.    See Dr. Martinez to consider occipital nerve blocks.

## 2017-09-21 NOTE — MR AVS SNAPSHOT
After Visit Summary   9/21/2017    Ada Welch    MRN: [id number]           Patient Information     Date Of Birth          1970        Visit Information        Provider Department      9/21/2017 11:25 AM Ellyn Rivera MD [facility]        Care Instructions    [facility]: [phone]     [facility] Medication Refill Request Information:  * Please contact your pharmacy regarding ANY request for medication refills.  ** [facility] Prescription Fax = [phone]  * Please allow 3 business days for routine medication refills.  * Please allow 5 business days for controlled substance medication refills.     [facility] Test Result notification information:  *You will be notified with in 7-10 days of your appointment day regarding the results of your test.  If you are on MyChart you will be notified as soon as the provider has reviewed the results and signed off on them.        Try increasing amitriptyline to 30 mg at bedtime.    Stop reglan one week prior to gastric emptying study.    We can try to start topamax for headache prevention.    Start prilosec one a day and we will add in an H2 blocker like pepcid at night for mast cells.    See Dr. Martinez to consider occipital nerve blocks.              Follow-ups after your visit        Follow-up notes from your care team     Return in about 2 months (around 11/21/2017) for Routine Visit.      Your next 10 appointments already scheduled     Sep 26, 2017 12:30 PM CDT   (Arrive by 12:15 PM)   NEW BLEEDING DISORDER with Cynthia Leach MD   [facility] ([facility])    [address]  [address]rd [facility] [id number] [phone]            Sep 27, 2017 11:20 AM CDT   DARIA Spine with Armani Leon PT   [facility] ([facility] [place])    [address] [place] [address]  [id number]  [place] [id number] [phone]             Sep 28, 2017  8:30 AM CDT   New Sleep Patient with Bennett Ezra Goltz, PA-C   [facility] ([facility])    [address]  [address]  [facility] [id number] [phone]            Oct 02, 2017  8:00 AM CDT   NM GASTRIC EMPTYING UU UR MG with UUNM2   [facility] [place] ([facility] [place])    [address] [id number] [phone]           Please bring a list of your medicines to the exam. (Include vitamins, minerals and over-the-counter drugs.) You should wear comfortable clothes. Leave your valuables at home. Please bring related prior results and films.  Tell your doctor:   If you are breastfeeding or may be pregnant.   If you have had a barium test within the past few days. Barium may change the results of certain exams.  No food or drink (including water) for 4 hours prior to the exam.  Please call your Imaging Department at your exam site with any questions.            Oct 02, 2017 12:30 PM CDT   (Arrive by 12:15 PM)   Return Visit with FIDELINA Ridley [facility] ([facility])    [address] [place] [address]  3rd [facility] [id number] [phone]            Oct 04, 2017  8:00 AM CDT   (Arrive by 7:45 AM)   New Patient Visit with Seth Galeano MD   [facility] ([facility])    [address] [place] [address]  3rd [facility] [id number] [phone]            Oct 04, 2017  1:00 PM CDT   (Arrive by 12:45 PM)   New Patient Visit with Juana Griffith MD   [facility] ([facility])    [address] [place] [address]  4th [facility] [phone]            Oct 05, 2017  3:30 PM CDT   (Arrive by 3:15 PM)   Implantable Loop Recorder with Uc Cv Device 1   [facility] ([facility])    [address] [place] [address]  [address] [address]   [phone]            Oct 05, 2017  4:00 PM CDT   (Arrive by 3:45 PM)   NEW ARRHYTHMIA with Kirk Russell MD   [facility] ([facility])    [address] [place] [id number] [phone]            Oct 06, 2017  9:00 AM CDT   XR VIDEO SPEECH EVALUATION WITH ESOPHAGRAM with UCXR2   [facility] [facility] ([facility])    [address] [place] [id number] [phone]           Please bring a list of your current medicines to your exam. (Include vitamins, minerals and over-the-counter medicines.) Leave your valuables at home.  Tell the doctor if there is a chance you could be pregnant.  Do not eat for 4 hours before the exam. Keep drinking clear liquids until 2 hours before the exam.  You may take pain medicine (with a sip of water) up to 4 hours before the exam.  Do not swallow any other medicines unless your doctor tells you to. Talk to your doctor to be sure it s safe to stop your medicines.  Please call the Imaging Department at your exam site with any questions.              Who to contact     Please call your clinic at [phone] to:    Ask questions about your health    Make or cancel appointments    Discuss your medicines    Learn about your test results    Speak to your doctor   If you have compliments or concerns about an experience at your clinic, or if you wish to file a complaint, please contact [facility] Patient Relations at [phone] or email us at [email or web address]         Additional Information About Your Visit        MyChart Information     [employer] gives you secure access to your electronic health record. If you see a primary care provider, you can also send messages to your care team and make appointments. If you have questions, please call your primary care clinic.  If you do not  have a primary care provider, please call [phone] and they will assist you.      [employer] is an electronic gateway that provides easy, online access to your medical records. With [employer], you can request a clinic appointment, read your test results, renew a prescription or communicate with your care team.     To access your existing account, please contact your [facility] or call [phone] for assistance.        Care EveryWhere ID     This is your Care EveryWhere ID. This could be used by other organizations to access your [place] medical records  [id number]        Your Vitals Were     Pulse Respirations BMI (Body Mass Index)             68 16 32.68 kg/m2          Blood Pressure from Last 3 Encounters:   09/21/17 133/88   09/12/17 126/88   08/30/17 (!) 145/106    Weight from Last 3 Encounters:   09/21/17 91.9 kg (202 lb 8 oz)   09/12/17 92.7 kg (204 lb 5.9 oz)   08/30/17 92.5 kg (204 lb)              Today, you had the following     No orders found for display         Today's Medication Changes          These changes are accurate as of: 9/21/17 12:52 PM.  If you have any questions, ask your nurse or doctor.               Stop taking these medicines if you haven't already. Please contact your care team if you have questions.     doxycycline 100 MG capsule   Commonly known as:  VIBRAMYCIN   Stopped by:  Ellyn Rivera MD                    Primary Care Provider Office Phone # Fax #    Ellyn Rivera [phone]       [address] [place] [id number]        Equal Access to Services     [facility] : Hadii kia serrano Sonegrito, waaxda luqadaha, qaybta kaalmada adeegyada, waxay idialessandro tang. So [facility] [phone].    ATENCIÓN: Si habla español, tiene a sweeney disposición servicios gratuitos de asistencia lingüística. Llame al [phone].    We comply with applicable federal civil rights laws and [place] laws. We  do not discriminate on the basis of race, color, national origin, age, disability sex, sexual orientation or gender identity.            Thank you!     Thank you for choosing [facility]  for your care. Our goal is always to provide you with excellent care. Hearing back from our patients is one way we can continue to improve our services. Please take a few minutes to complete the written survey that you may receive in the mail after your visit with us. Thank you!             Your Updated Medication List - Protect others around you: Learn how to safely use, store and throw away your medicines at www.disposemymeds.org.          This list is accurate as of: 9/21/17 12:52 PM.  Always use your most recent med list.                   Brand Name Dispense Instructions for use Diagnosis    ADVAIR DISKUS 250-50 MCG/DOSE diskus inhaler   Generic drug:  fluticasone-salmeterol           albuterol 108 (90 BASE) MCG/ACT Inhaler    PROAIR HFA/PROVENTIL HFA/VENTOLIN HFA     Inhale 2 puffs into the lungs        amitriptyline 10 MG tablet    ELAVIL    60 tablet    Take 1 tablet (10 mg) by mouth At Bedtime Take 2 tablets    Chronic migraine without aura without status migrainosus, not intractable       buprenorphine 10 MCG/HR WK patch    BUTRANS    4 patch    Place 1 patch onto the skin every 7 days    Postlaminectomy syndrome of lumbar region       cetirizine 10 MG tablet    zyrTEC          COMPRESSION STOCKINGS     3 each    1 each daily 20-30 mmHg Thigh Length measure and fit, color and style per patient preference. Doff n jose per need.    Orthostatic hypotension       EPINEPHrine 0.3 MG/0.3ML injection 2-pack    EPIPEN/ADRENACLICK/or ANY BX GENERIC EQUIV     Inject 0.3 mg into the muscle        etodolac 400 MG tablet    LODINE     Take 400 mg by mouth        fluticasone 50 MCG/ACT spray    FLONASE     2 sprays        ketamine (KETALAR) 5%-gabapentin (NEURONTIN) 8%-lidocaine 2.5% 5%-8% Gel topical PLO cream      30 g    Apply 1 g topically 3 times daily as needed    Postlaminectomy syndrome of lumbosacral region, Postlaminectomy syndrome of lumbar region       levothyroxine 25 mcg/mL Susp    SYNTHROID     25 mcg every morning        metoclopramide 5 MG tablet    REGLAN    20 tablet    Take 1 tablet (5 mg) by mouth every 6 hours as needed    Intractable chronic migraine without aura and with status migrainosus       MULTIVITAMIN PO      Take by mouth daily        omeprazole 20 MG CR capsule    priLOSEC     20 mg 2 times daily        ondansetron 4 MG ODT tab    ZOFRAN-ODT    60 tablet    Take 1-2 tablets (4-8 mg) by mouth every 12 hours as needed for nausea    Migraine without status migrainosus, not intractable, unspecified migraine type       polyethylene glycol powder    MIRALAX/GLYCOLAX          PROBIOTIC DAILY PO      Take by mouth 2 times daily        rizatriptan 5 MG ODT tab    MAXALT-MLT    20 tablet    TAKE 1-2 TABLETS (5-10 MG) BY MOUTH AT ONSET OF HEADACHE FOR MIGRAINE (MAX 30 MG IN 24 HOURS)    Migraine without status migrainosus, not intractable, unspecified migraine type       senna 8.6 MG tablet    SENOKOT     Take 17.2-34.4 mg by mouth        SUMAtriptan 6 MG/0.5ML pen injector kit    IMITREX STATDOSE    2 kit    Inject 0.5 mLs (6 mg) Subcutaneous at onset of headache for migraine (may repeat once after 2 hrs) May repeat in 1 hour. Max 12 mg/24 hours.    Chronic migraine without aura without status migrainosus, not intractable       tiZANidine 4 MG tablet    ZANAFLEX    90 tablet    Take 2 tablets (8 mg) by mouth 3 times daily as needed for muscle spasms    Post laminectomy syndrome       traMADol 50 MG tablet    ULTRAM    15 tablet    Take 1 tablet (50 mg) by mouth every 6 hours as needed for pain maximum 4 tablet(s) per day    Migraine with aura and without status migrainosus, not intractable

## 2017-09-22 DIAGNOSIS — G43.709 CHRONIC MIGRAINE WITHOUT AURA WITHOUT STATUS MIGRAINOSUS, NOT INTRACTABLE: Primary | ICD-10-CM

## 2017-09-22 RX ORDER — MELOXICAM 7.5 MG/1
7.5 TABLET ORAL DAILY
Qty: 15 TABLET | Refills: 0 | Status: SHIPPED | OUTPATIENT
Start: 2017-09-22 | End: 2017-10-03

## 2017-09-22 ASSESSMENT — ANXIETY QUESTIONNAIRES: GAD7 TOTAL SCORE: 0

## 2017-09-22 ASSESSMENT — PATIENT HEALTH QUESTIONNAIRE - PHQ9: SUM OF ALL RESPONSES TO PHQ QUESTIONS 1-9: 0

## 2017-09-26 ENCOUNTER — OFFICE VISIT (OUTPATIENT)
Dept: HEMATOLOGY | Facility: CLINIC | Age: 47
End: 2017-09-26
Attending: INTERNAL MEDICINE
Payer: COMMERCIAL

## 2017-09-26 ENCOUNTER — PRE VISIT (OUTPATIENT)
Dept: NEUROLOGY | Facility: CLINIC | Age: 47
End: 2017-09-26

## 2017-09-26 VITALS
OXYGEN SATURATION: 99 % | DIASTOLIC BLOOD PRESSURE: 86 MMHG | RESPIRATION RATE: 18 BRPM | HEIGHT: 66 IN | HEART RATE: 118 BPM | BODY MASS INDEX: 32.3 KG/M2 | SYSTOLIC BLOOD PRESSURE: 122 MMHG | WEIGHT: 201 LBS | TEMPERATURE: 97.7 F

## 2017-09-26 DIAGNOSIS — R79.1 ELEVATED FACTOR VIII LEVEL: Primary | ICD-10-CM

## 2017-09-26 PROCEDURE — 99212 OFFICE O/P EST SF 10 MIN: CPT

## 2017-09-26 PROCEDURE — 99204 OFFICE O/P NEW MOD 45 MIN: CPT | Performed by: INTERNAL MEDICINE

## 2017-09-26 RX ORDER — LEVOTHYROXINE SODIUM 25 UG/1
25 TABLET ORAL DAILY
COMMUNITY
End: 2018-08-13

## 2017-09-26 ASSESSMENT — ENCOUNTER SYMPTOMS
POLYDIPSIA: 1
SPEECH CHANGE: 0
CONSTIPATION: 1
WEIGHT GAIN: 0
VOMITING: 0
DECREASED LIBIDO: 0
SLEEP DISTURBANCES DUE TO BREATHING: 0
DYSURIA: 0
ARTHRALGIAS: 1
HEADACHES: 1
SPUTUM PRODUCTION: 1
INCREASED ENERGY: 1
NECK PAIN: 1
STIFFNESS: 1
LOSS OF CONSCIOUSNESS: 1
DISTURBANCES IN COORDINATION: 1
PARALYSIS: 0
COUGH: 1
ARTHRALGIAS: 1
EXERCISE INTOLERANCE: 0
HEARTBURN: 1
NECK PAIN: 1
MEMORY LOSS: 1
WHEEZING: 1
FEVER: 0
MUSCLE WEAKNESS: 1
BRUISES/BLEEDS EASILY: 1
TREMORS: 0
NAUSEA: 1
HALLUCINATIONS: 0
STIFFNESS: 1
ALTERED TEMPERATURE REGULATION: 1
LOSS OF CONSCIOUSNESS: 1
CHILLS: 1
COUGH DISTURBING SLEEP: 0
BOWEL INCONTINENCE: 0
NECK MASS: 1
TASTE DISTURBANCE: 0
HEARTBURN: 1
HEMOPTYSIS: 0
EYE IRRITATION: 1
SYNCOPE: 1
INCREASED ENERGY: 1
PALPITATIONS: 1
NUMBNESS: 1
TASTE DISTURBANCE: 0
ARTHRALGIAS: 1
SEIZURES: 0
SINUS PAIN: 1
LEG SWELLING: 1
POLYPHAGIA: 0
FEVER: 0
BLOOD IN STOOL: 0
POLYDIPSIA: 1
WHEEZING: 1
HYPOTENSION: 1
TREMORS: 0
POSTURAL DYSPNEA: 1
SPUTUM PRODUCTION: 1
ORTHOPNEA: 0
WHEEZING: 1
HALLUCINATIONS: 0
PARALYSIS: 0
TROUBLE SWALLOWING: 1
BLOATING: 1
DECREASED APPETITE: 1
SWOLLEN GLANDS: 1
FEVER: 0
SNORES LOUDLY: 1
CONSTIPATION: 1
TINGLING: 1
CLAUDICATION: 0
EXERCISE INTOLERANCE: 0
SORE THROAT: 0
SHORTNESS OF BREATH: 0
ABDOMINAL PAIN: 1
RESPIRATORY PAIN: 0
POLYDIPSIA: 1
HYPOTENSION: 1
HOARSE VOICE: 1
HOARSE VOICE: 1
HYPERTENSION: 0
PARALYSIS: 0
EYE PAIN: 0
EYE PAIN: 0
ABDOMINAL PAIN: 1
WEIGHT GAIN: 0
EYE REDNESS: 1
MEMORY LOSS: 1
SEIZURES: 0
MEMORY LOSS: 1
ALTERED TEMPERATURE REGULATION: 1
FATIGUE: 1
FATIGUE: 1
EYE IRRITATION: 1
HEMOPTYSIS: 0
SHORTNESS OF BREATH: 0
SWOLLEN GLANDS: 1
NIGHT SWEATS: 0
SPUTUM PRODUCTION: 1
TACHYCARDIA: 1
NIGHT SWEATS: 0
DOUBLE VISION: 1
NECK PAIN: 1
MUSCLE CRAMPS: 1
HOT FLASHES: 0
NAUSEA: 1
EYE PAIN: 0
CLAUDICATION: 0
FLANK PAIN: 1
SNORES LOUDLY: 1
SINUS PAIN: 1
BRUISES/BLEEDS EASILY: 1
WEIGHT LOSS: 0
BOWEL INCONTINENCE: 0
SINUS CONGESTION: 1
BLOOD IN STOOL: 0
JOINT SWELLING: 1
ABDOMINAL PAIN: 1
MYALGIAS: 1
SLEEP DISTURBANCES DUE TO BREATHING: 0
NAUSEA: 1
ORTHOPNEA: 0
SEIZURES: 0
SEIZURES: 0
JOINT SWELLING: 1
HEMATURIA: 0
TASTE DISTURBANCE: 0
JAUNDICE: 0
HYPOTENSION: 1
DECREASED APPETITE: 1
INCREASED ENERGY: 1
PARALYSIS: 0
CLAUDICATION: 0
WEAKNESS: 1
EYE REDNESS: 1
POLYPHAGIA: 0
RECTAL BLEEDING: 0
EXERCISE INTOLERANCE: 0
SINUS CONGESTION: 1
DECREASED APPETITE: 1
WEIGHT LOSS: 0
HEMOPTYSIS: 0
HALLUCINATIONS: 0
DYSPNEA ON EXERTION: 0
HOT FLASHES: 0
LOSS OF CONSCIOUSNESS: 1
STIFFNESS: 1
MUSCLE CRAMPS: 1
HEADACHES: 1
ABDOMINAL PAIN: 1
PALPITATIONS: 1
ORTHOPNEA: 0
ORTHOPNEA: 0
HEMOPTYSIS: 0
DYSURIA: 0
LEG PAIN: 1
CLAUDICATION: 0
SPEECH CHANGE: 0
NAUSEA: 1
RECTAL PAIN: 0
EYE IRRITATION: 1
DECREASED LIBIDO: 0
SYNCOPE: 1
COUGH: 1
HOARSE VOICE: 1
DYSPNEA ON EXERTION: 0
NECK MASS: 1
STIFFNESS: 1
BOWEL INCONTINENCE: 0
BACK PAIN: 1
LEG PAIN: 1
TINGLING: 1
BLOATING: 1
JOINT SWELLING: 1
CONSTIPATION: 1
MUSCLE WEAKNESS: 1
DYSPNEA ON EXERTION: 0
DIFFICULTY URINATING: 1
WEAKNESS: 1
LEG SWELLING: 1
LOSS OF CONSCIOUSNESS: 1
FLANK PAIN: 1
POLYPHAGIA: 0
BRUISES/BLEEDS EASILY: 1
SYNCOPE: 1
VOMITING: 0
EYE WATERING: 1
SORE THROAT: 0
EYE IRRITATION: 1
HYPOTENSION: 1
HYPERTENSION: 0
DIARRHEA: 0
LIGHT-HEADEDNESS: 1
DOUBLE VISION: 1
SPUTUM PRODUCTION: 1
SMELL DISTURBANCE: 0
HYPERTENSION: 0
POSTURAL DYSPNEA: 1
TROUBLE SWALLOWING: 1
LIGHT-HEADEDNESS: 1
DIARRHEA: 0
SWOLLEN GLANDS: 1
WEIGHT LOSS: 0
DISTURBANCES IN COORDINATION: 1
NECK MASS: 1
NECK PAIN: 1
JOINT SWELLING: 1
WEIGHT GAIN: 0
DISTURBANCES IN COORDINATION: 1
POLYDIPSIA: 1
NECK MASS: 1
BACK PAIN: 1
CHILLS: 1
SNORES LOUDLY: 1
SORE THROAT: 0
BLOATING: 1
VOMITING: 0
VOMITING: 0
WEAKNESS: 1
COUGH DISTURBING SLEEP: 0
DYSURIA: 0
MUSCLE WEAKNESS: 1
FLANK PAIN: 1
TINGLING: 1
DECREASED LIBIDO: 0
HEARTBURN: 1
NIGHT SWEATS: 0
RESPIRATORY PAIN: 0
SWOLLEN GLANDS: 1
FATIGUE: 1
MYALGIAS: 1
RECTAL PAIN: 0
DIARRHEA: 0
LEG PAIN: 1
PALPITATIONS: 1
NUMBNESS: 1
LEG SWELLING: 1
HOT FLASHES: 0
RECTAL PAIN: 0
HEADACHES: 1
DOUBLE VISION: 1
JAUNDICE: 0
SINUS CONGESTION: 1
SINUS CONGESTION: 1
EYE WATERING: 1
BLOOD IN STOOL: 0
FATIGUE: 1
SLEEP DISTURBANCES DUE TO BREATHING: 0
TACHYCARDIA: 1
HEMATURIA: 0
TACHYCARDIA: 1
JAUNDICE: 0
MUSCLE CRAMPS: 1
DIFFICULTY URINATING: 1
BRUISES/BLEEDS EASILY: 1
TACHYCARDIA: 1
SORE THROAT: 0
BLOATING: 1
DIZZINESS: 1
TREMORS: 0
COUGH: 1
CHILLS: 1
TINGLING: 1
COUGH: 1
EXERCISE INTOLERANCE: 0
NUMBNESS: 1
EYE REDNESS: 1
TASTE DISTURBANCE: 0
HALLUCINATIONS: 0
PALPITATIONS: 1
EYE PAIN: 0
BACK PAIN: 1
FEVER: 0
RECTAL BLEEDING: 0
COUGH DISTURBING SLEEP: 0
EYE REDNESS: 1
DOUBLE VISION: 1
INCREASED ENERGY: 1
TROUBLE SWALLOWING: 1
DYSURIA: 0
POSTURAL DYSPNEA: 1
LEG SWELLING: 1
HEADACHES: 1
DISTURBANCES IN COORDINATION: 1
NIGHT SWEATS: 0
WHEEZING: 1
BLOOD IN STOOL: 0
SMELL DISTURBANCE: 0
HEMATURIA: 0
DECREASED LIBIDO: 0
LIGHT-HEADEDNESS: 1
HOT FLASHES: 0
SMELL DISTURBANCE: 0
HYPERTENSION: 0
LIGHT-HEADEDNESS: 1
ALTERED TEMPERATURE REGULATION: 1
JAUNDICE: 0
SINUS PAIN: 1
SYNCOPE: 1
ARTHRALGIAS: 1
LEG PAIN: 1
DIARRHEA: 0
RECTAL BLEEDING: 0
WEIGHT GAIN: 0
MEMORY LOSS: 1
RECTAL PAIN: 0
BACK PAIN: 1
DIZZINESS: 1
WEAKNESS: 1
DIZZINESS: 1
MYALGIAS: 1
SNORES LOUDLY: 1
MUSCLE WEAKNESS: 1
RESPIRATORY PAIN: 0
ALTERED TEMPERATURE REGULATION: 1
HEARTBURN: 1
DIFFICULTY URINATING: 1
RESPIRATORY PAIN: 0
HEMATURIA: 0
CHILLS: 1
DIZZINESS: 1
SHORTNESS OF BREATH: 0
COUGH DISTURBING SLEEP: 0
MYALGIAS: 1
WEIGHT LOSS: 0
FLANK PAIN: 1
SINUS PAIN: 1
CONSTIPATION: 1
SPEECH CHANGE: 0
POSTURAL DYSPNEA: 1
TREMORS: 0
POLYPHAGIA: 0
EYE WATERING: 1
SLEEP DISTURBANCES DUE TO BREATHING: 0
MUSCLE CRAMPS: 1
RECTAL BLEEDING: 0
SMELL DISTURBANCE: 0
NUMBNESS: 1
SPEECH CHANGE: 0
BOWEL INCONTINENCE: 0
DECREASED APPETITE: 1
DYSPNEA ON EXERTION: 0
TROUBLE SWALLOWING: 1
DIFFICULTY URINATING: 1
HOARSE VOICE: 1
SHORTNESS OF BREATH: 0
EYE WATERING: 1

## 2017-09-26 ASSESSMENT — PAIN SCALES - GENERAL: PAINLEVEL: MODERATE PAIN (5)

## 2017-09-26 NOTE — PATIENT INSTRUCTIONS
Get a follow up ultrasound through Dr. Rose of your lymph node in your neck.  Dr. Leach is happy to discuss this with you.  Please call our office in December to schedule a time with her for January. Our number to schedule is [phone].    If surgery is planned, call our office and we can discuss with Dr. Leach if she has any recommendations.    To pursue mast cell issues, the person at the  with the most experience is Dr. Stef Doshi.  You can call their clinic to schedule with him at [phone].    Please call our office if we can help you in any way.    It was a pleasure meeting with you today.  Thank you for allowing us the privilege of being involved in your care.  YOU are the reason we are here, and truly hope we provided you with the excellent service you deserve.  Please let us know if there is anything else we can do for you, so that we can be sure you are leaving completely satisfied with your care experience.    The Staff of [facility] - [phone]    Ilda Jensen RN - Nurse Clinician - [facility] - [phone]

## 2017-09-26 NOTE — PROGRESS NOTES
[facility] NEW PATIENT CONSULT NOTE         Ms. Welch is a 46 year old female here for new consultation regarding a diagnosis of EDS III and elevated factor 8. She also has concerns that she has MCAS and a LN in her neck which may be malignant    History of Present Illness:  Ada is a pleasant 46-year-old woman with a past medical history notable for Ehler's Danlos syndrome type III, migraines chronic back pain, tachycardia, urinary incontinence who recently moved to the [facility] from [facility]. She has several chronic health issues and has seen several specialists .    She apparently traveled to [facility] to see a neurosurgical specialist  (Dr. 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 She reports that she was found to have C3-7 disc degen, C5 syrinx, chairi R, stenosis, cranial cervical instability, C1-2 calcification.  He wanted her to wear a neck brace and see a TMJ specialist in [place].   She was seen in GI clinic for hiatal hernia, eosinophils, esophageal rings.  They recommended GES, esophagram and f/u in regular GI clinic for IBS.  She also saw cardiology and ENT for sinusitis.       Further information of her hematological issues :  Ehler's Danlos syndrome: Type 3/hypermobility type. 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 He reports having a Beighton score of 7-8 out of 9. She reports having genetic testing done, which was questionable for the MYLK Gene. She follows with a rheumatologist who is an EDS specialist in [place]. He is apparently concerned about some sort of Chiari malformation in the context of her head and neck pain and will be flying to [facility] to visit a special neurosurgeon Dr. Jesus Alberto Sofia. She also struggled with low back pain for many years. She underwent L4 to S1 fusion in 2013 followed by removal of instrumentation, which did not improve her symptoms. She met with  Dr. Martinez in the pain clinic and was taken off high doses of Dilaudid and started on Suboxone, which has dramatically improved her symptoms.She continues to work with physical therapy twice a week as well as other complementary therapies.    Elevated factor 8 and concern for thrombophilia- she has not had any thrombotic event arterial vs venous. She has had extensive evaluations for unclear reasons to me and had elevated factor 8 @ 280 % in 10/10/2016 and 352 % in jan 2017. She has also had VW eval that was negative. She also had factor 9 checked whoch was 93%.   Her PTT and INR have been normal. Also she has not had any bleeding complications from gums, nose,  or GI tract. She has had issues with Vaginal bleeding that have been fluctuant over her life span and she states that she was diagnosed with endometriosis at a early age, since then she has born 5 children - all vaginal deliveries and no need for blood transfusions.  She still has her periods and they again fluctuate but when bad they last 7 days and she changes pads every hour or so. She has needed blood transfusion only once in her life when she had her cervical spine surgery.         Cervical LAD: she has had CT and US evaluation and also had a FNA which were all unremarkable for concerning findings but is a constant source of angst for her.     Answers for HPI/ROS submitted by the patient on 9/26/2017   If you checked off any problems, how difficult have these problems made it for you to do your work, take care of things at home, or get along with other people?: Not difficult at all  PHQ9 TOTAL SCORE: 0  SAMANTHA 7 TOTAL SCORE: 0  EYE SYMPTOMS: Yes  INTESTINAL SYMPTOMS: Yes  NERVOUS SYSTEM SYMPTOMS: Yes  Fever: No  Loss of appetite: Yes  Weight loss: No  Weight gain: No  Fatigue: Yes  Night sweats: No  Chills: Yes  Increased stress: No  Excessive hunger: No  Excessive thirst: Yes  Feeling hot or cold when others believe the temperature is normal: Yes  Loss  of height: No  Post-operative complications: Yes  Surgical site pain: Yes  Hallucinations: No  Change in or Loss of Energy: Yes  Hyperactivity: No  Confusion: Yes  Ear pain: Yes  Ear discharge: No  Hearing loss: Yes  Tinnitus: Yes  Nosebleeds: No  Congestion: Yes  Sinus pain: Yes  Trouble swallowing: Yes   Voice hoarseness: Yes  Mouth sores: No  Sore throat: No  Tooth pain: No  Gum tenderness: No  Bleeding gums: No  Change in taste: No  Change in sense of smell: No  Dry mouth: Yes  Hearing aid used: No  Neck lump: Yes  Eye pain: No  Vision loss: Yes  Dry eyes: Yes  Watery eyes: Yes  Eye bulging: No  Double vision: Yes  Flashing of lights: Yes  Spots: Yes  Floaters: Yes  Redness: Yes  Crossed eyes: No  Tunnel Vision: Yes  Yellowing of eyes: No  Eye irritation: Yes  Cough: Yes  Sputum or phlegm: Yes  Coughing up blood: No  Difficulty breating or shortness of breath: No  Snoring: Yes  Wheezing: Yes  Difficulty breathing on exertion: No  Respiratory pain: No  Nighttime Cough: No  Difficulty breathing when lying flat: Yes  Chest pain or pressure: No  Fast or irregular heartbeat: Yes  Pain in legs with walking: Yes  Swelling in feet or ankles: Yes  Trouble breathing while lying down: No  Fingers or Toes appear blue: Yes  High blood pressure: No  Low blood pressure: Yes  Fainting: Yes  Murmurs: No  Chest pain on exertion: No  Chest pain at rest: No  Cramping pain in leg during exercise: No  Pacemaker: No  Varicose veins: No  Edema or swelling: Yes  Fast heart beat: Yes  Wake up at night with shortness of breath: No  Heart flutters: No  Light-headedness: Yes  Exercise intolerance: No  Heart burn or indigestion: Yes  Nausea: Yes  Vomiting: No  Abdominal pain: Yes  Bloating: Yes  Constipation: Yes  Diarrhea: No  Blood in stool: No  Black stools: No  Rectal or Anal pain: No  Fecal incontinence: No  Rectal bleeding: No  Yellowing of skin or eyes: No  Vomit with blood: No  Change in stools: No  Hemorrhoids: Yes  Trouble holding  urine or incontinence: Yes  Pain or burning: No  Trouble starting or stopping: Yes  Increased frequency of urination: Yes  Blood in urine: No  Decreased frequency of urination: No  Frequent nighttime urination: Yes  Flank pain: Yes  Difficulty emptying bladder: Yes  Back pain: Yes  Muscle aches: Yes  Neck pain: Yes  Swollen joints: Yes  Joint pain: Yes  Bone pain: Yes  Muscle cramps: Yes  Muscle weakness: Yes  Joint stiffness: Yes  Bone fracture: No  Anemia: No  Swollen glands: Yes  Easy bleeding or bruising: Yes  Trouble with coordination: Yes  Dizziness or trouble with balance: Yes  Fainting or black-out spells: Yes  Memory loss: Yes  Headache: Yes  Seizures: No  Speech problems: No  Tingling: Yes  Tremor: No  Weakness: Yes  Difficulty walking: Yes  Paralysis: No  Numbness: Yes  Bleeding or spotting between periods: No  Heavy or painful periods: Yes  Irregular periods: No  Vaginal discharge: No  Hot flashes: No  Vaginal dryness: Yes  Genital ulcers: No  Reduced libido: No  Painful intercourse: No  Difficulty with sexual arousal: No  Post-menopausal bleeding: No    Conflicting answers have been found for some questions. Please document the patient's answers manually.     Past Medical History:  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     Chronic sinusitis 00/2007    Diagnosed with chronic pansinusitis 2016     Chronic tonsillitis 1980    Had tonsils removed 02/1981, rheumatic fever     Gastroesophageal reflux disease 3/1/2017    I have large hiatal hernia     Hernia, abdominal 10/2016    Hiatal Hernia     Hiatal hernia 2016    Have adeline hiatel hernia     Hoarseness Unsure    It comes and goes     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/2015    Common with Tom  Danlos Syndrome     Migraines 1/1/2007    Unsure of exact date     Nasal polyps 2013    Were revoved 03/2017, benign     Pneumonia Unsure    Have had pneumonia several times     Swelling, mass, or lump in head and neck 08/2016    Lymph node has been growing for last year     Thyroid disease 01/01/2008         Past Surgical History:  Past Surgical History:   Procedure Laterality Date     ADENOIDECTOMY  1981     AS REPAIR OF NASAL SEPTUM       NASAL/SINUS POLYPECTOMY  2017    Polyps were benign     TONSILLECTOMY       TONSILLECTOMY  1981     TUBAL LIGATION         Active Meds:  Current Outpatient Prescriptions   Medication     levothyroxine (SYNTHROID) 25 MCG tablet     meloxicam (MOBIC) 7.5 MG tablet     amitriptyline (ELAVIL) 10 MG tablet     famotidine (PEPCID) 20 MG tablet     omeprazole (PRILOSEC) 20 MG CR capsule     ondansetron (ZOFRAN-ODT) 4 MG ODT tab     metoclopramide (REGLAN) 5 MG tablet     rizatriptan (MAXALT-MLT) 5 MG ODT tab     tiZANidine (ZANAFLEX) 4 MG tablet     buprenorphine (BUTRANS) 10 MCG/HR WK patch     EPINEPHrine (EPIPEN/ADRENACLICK/OR ANY BX GENERIC EQUIV) 0.3 MG/0.3ML injection 2-pack     albuterol (PROAIR HFA/PROVENTIL HFA/VENTOLIN HFA) 108 (90 BASE) MCG/ACT Inhaler     fluticasone (FLONASE) 50 MCG/ACT spray     COMPRESSION STOCKINGS     SUMAtriptan (IMITREX STATDOSE) 6 MG/0.5ML pen injector kit     ketamine, KETALAR, 5%-gabapentin, NEURONTIN, 8%-lidocaine 2.5% 5%-8% GEL topical PLO cream     fluticasone-salmeterol (ADVAIR DISKUS) 250-50 MCG/DOSE diskus inhaler     cetirizine (ZYRTEC) 10 MG tablet     polyethylene glycol (MIRALAX/GLYCOLAX) powder     Multiple Vitamins-Minerals (MULTIVITAMIN PO)     Probiotic Product (PROBIOTIC DAILY PO)     traMADol (ULTRAM) 50 MG tablet     etodolac (LODINE) 400 MG tablet     [DISCONTINUED] levothyroxine (SYNTHROID) 25 mcg/mL SUSP     No current facility-administered medications for this visit.         Allergies:  Bee venom; Chicken-derived products  "(egg); Gabapentin; Gluten meal; Lactose; Milk protein extract; Topiramate; Wheat; and Wheat bran    Family History:  family history includes Asthma in her sister and son; CANCER in her father; Connective Tissue Disorder in her mother and sister; DIABETES in her maternal grandmother, paternal grandfather, and paternal grandmother; HEART DISEASE in her maternal grandmother; Hypertension in her maternal grandmother; Migraines in her father, sister, and sister; Thyroid Disease in her sister.    Social History:  Social History   Substance Use Topics     Smoking status: Former Smoker     Packs/day: 0.20     Years: 10.00     Types: Cigarettes     Start date: 1/1/1991     Quit date: 12/1/2013     Smokeless tobacco: Never Used      Comment: I smoked on and off during specified dates.     Alcohol use Yes      Comment: couple per year   she has 5 children, all born by vaginal deliveries  She is ,  She is not currently working    Physical Exam:  Vitals: /86 (BP Location: Right arm, Patient Position: Sitting, Cuff Size: Adult Regular)  Pulse 118  Temp 97.7  F (36.5  C) (Oral)  Resp 18  Ht 1.676 m (5' 5.98\")  Wt 91.2 kg (201 lb)  SpO2 99%  BMI 32.46 kg/m2  Constitutional: Alert, oriented, pleasant, no acute distress  Head: Normocephalic, atraumatic  Eyes: Extra-ocular movements intact, pupils equally round and reactivebilaterally, no scleral icterus  ENT: Oropharynx clear, moist mucus membranes, good dentition  Neck: Wearing cervical collar, supple, No clear LAD was appreciated on clinical exam  Musculoskeletal: No edema, normal muscle tone, normal gait, no head/neck  Tenderness, ROM neck intact  Neurologic: Alert and oriented, cranial nerves 2-12 intact, nonfocal exam  Psychiatric: normal mentation, affect and mood        Labs :   FNA LN   Ordering Location:     [facility]           Received:            04/05/2017 0755                                     " OTOLARYNGOLOGY                                                               Pathologist:           Ronal Lopez MD                                                         Specimen:    FNA neck                                                                                   Diagnosis Lymph node, neck, aspirate: No immunophenotypic evidence of leukemia or lymphoma. See comment.     Comment Correlation with complete tissue morphology is also recommended.     Morphology Review of the neck lymph node aspirate demonstrates predominantly blood elements with occasional variably-sized, polymorphous-appearing lymphocytes. Cells diagnostic of lymphoma are not identified. There is no increase in blasts.     Panel CD45, CD8, CD56, CD10, CD5, CD33, CD34, CD3, CD4, CD19, CD20, Kappa and Lambda.        Analyte Specific Reagents.  This test was developed and its performance characteristics determined by [facility].  It has not been cleared or approved by the U.S. Food and Drug Administration.     Panel Interpretation                    Result     Lymphocytes      84%   Blasts (CD45 and light scatter)      N/A   CD34 (% of blasts      N/A   CD19 (% of lymphocytes      29%   CD20 (% of lymphs)      30%   CD3 (% of lymphs)      68%   CD5 (% of lymphs)      69%   CD19/CD5 (% of lymphs)      N/A   Kappa (% of B lymphs)      55%   Lambda (% of B lymphs)      45%   Kappa/Lambda Ratio      1.2:1      There is a discrete population of lymphocytes characterized by bright CD45 staining and low light side scatter numbering 84%.  The majority of lymphocytes are T-cells which demonstrate coexpression of the T-cell markers CD3 and CD5 normally with a CD4:CD8 ratio of 3.5:1.  B-cells appear polyclonal by surface light chain analysis and are positive for B-cell markers CD19 and CD20.  A monoclonal B-lymphoid population is not identified.  There is a small population of CD3 negative cells with variable  coexpression of CD56 identified numbering 3%, consistent with natural killer cells.  There is no increase in blasts.           Medical Cytology Report                           Case: [id number]                                  Authorizing Provider:  Favian Siddiqi MD          Collected:           04/04/2017 0839              [facility]           Received:            04/04/2017 0846                                     OTOLARYNGOLOGY                                                               Pathologist:           Beti Shaver MD                                                       Specimen:    Neck                                                                                      Non-Gynecologic Interpretation A Neck:  Negative for malignant cells.  - Consistent with lymph node sampling.     Comments The flow cytometric analysis of this specimen ([id number]) shows no immunophenotypic evidence of leukemia or lymphoma. The findings could be consistent with a reactive lymph node.  Clinically correlate.       Coag work up  as mentioned in HPI       Assessment and plan :  1. EDS III- she [place] experts [place] and is very satisfied with the cares and really not looking for my input on this condition. Bruising likely an issue compounded by her ongoing NSAID use.     2. Elevated factor 8: she has not had any thrombotic complications so far. We discussed that factor 8 is a very labile factor and it seems that she had something or the other when she had the levels drawn in the past. Due to lack of thrombotic complications and ongoing concerns for bruising from her EDS with ongoing NSAID use no medication management will be recommended based on the current information. Of course if this were to change the recommendation will have to be re-visited.    3. CX LAD: No B symptoms per se - she has several findings on ROS that are self conflicting so hard to understand her concerns,  in any case, I reviewed her prior imaging and work up with her and expressed my opinion that this seems a very benign finding and can be monitored clinically without further imaging. The patient states that this is so deep seated and is a constant source of concern for her and she will prefer to monitor this with imaging findings. She will continue to follow up with her PCP with imaging and if there was any concern on the imaging she will get a repeat FNA work up based on the imaging findings and will return to discuss the results with me.     I spent 55 minutes in the care of this patient >50% of which was spent in coordinating and counseling.    Cynthia Leach   of Medicine   Hematology and medical Oncology   [facility]

## 2017-09-26 NOTE — Clinical Note
9/26/2017       RE: Ada Welch  [address] [place] [id number]     Dear Colleague,    Thank you for referring your patient, Ada Welch, to the [facility] at [facility]. Please see a copy of my visit note below.    No notes on file    Again, thank you for allowing me to participate in the care of your patient.      Sincerely,    Cynthia Leach MD

## 2017-09-26 NOTE — TELEPHONE ENCOUNTER
Faxed cover sheets to adrián echevarria from [facility] - 2nd fax sent

## 2017-09-26 NOTE — TELEPHONE ENCOUNTER
1.  Date/reason for appt:  10/14/17   Headaches    2.  Referring provider:  Dr Ortega, Internal    3.  Call to patient (Yes / No - short description):  No established pt    Records reviewed.  All records are in Epic

## 2017-09-26 NOTE — LETTER
9/26/2017      RE: Ada Welch  [address] [place] [address]         Ms. Welch is a 46 year old female here for follow up.    History of Present Illness:  Ada is a pleasant 46-year-old woman with a past medical history notable for Ehler's Danlos syndrome type III, migraines chronic back pain, tachycardia, urinary incontinence who recently moved to the [facility] from [facility]. She has several chronic health issues and has seen several specialists in the interim.    She apparently traveled to [facility] to see a neurosurgical specialist  (Dr. 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  I have no records but she reports that she was found to have C3-7 disc degen, C5 syrinx, chairi R, stenosis, cranial cervical instability, C1-2 calcification.  He wanted her to wear a neck brace and see a TMJ specialist in [place]. She will f/u in hematology for an elevated Factor 8 level.  She was seen in GI clinic for hiatal hernia, eosinophils, esophageal rings.  They recommended GES, esophagram and f/u in regular GI clinic for IBS.  She still endorses. pain after eating with heavier foods, nauseated  In July she was hospitalized for intractable HA which responded only to DHA.  She had an LP and MRI which were normal.  She was seen in f/u in Neuro clinic.  The increased amitriptyline to 20 mg.  She started PT was  [facility], [place], craniosacral, myofacial release, isometrics.  She also saw cardiology and ENT for sinusitis.    Previous Documentation:  Ehler's Danlos syndrome: Type 3/hypermobility type. 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 He reports having a Beighton score of 7-8 out of 9. She reports having genetic testing done, which was questionable for the MYLK Gene. She follows with a rheumatologist who is an EDS specialist in [place]. He is apparently concerned about some sort of Chiari  malformation in the context of her head and neck pain and will be flying to [facility] to visit a special neurosurgeon Dr. Jesus Alberto Sofia. She also struggled with low back pain for many years. She underwent L4 to S1 fusion in 2013 followed by removal of instrumentation, which did not improve her symptoms. She met with Dr. Martinez in the pain clinic and was taken off high doses of Dilaudid and started on Suboxone, which has dramatically improved her symptoms.  She continues to work with physical therapy twice a week as well as other complementary therapies.    Headaches:She does have a history of migraine headaches, as well as a headache which started last week and has remained fairly constant. She reports it started in the back of her head bilaterally approximately 10 days ago. It has waxed and waned in intensity since this time.She has used only over-the-counter medications thus far. She has some associated nausea. She has no other neurologic changes acutely. He denies any recent changes in medications sleep stress activity or caffeine.    Other issues that she struggles with include chronic tachycardia, for which she has a loop recorder implanted. She describes asthma as well as pulmonary nodules. She has a hiatal hernia as well as chronic sinus issues. She reports some autonomic issues. She apparently has a enlarged lymph node in the cervical region, which was biopsied and found to be benign, under active surveillance. She has chronic urge incontinence as well as incomplete bladder emptying.      A full 10-pt Review of Systems was performed, verified and is negative except as documented in the HPI.  All health questionnaires were reviewed, verified and relevant information documented above.    Past Medical History:  See above    Past Surgical History:  Past Surgical History:   Procedure Laterality Date     ADENOIDECTOMY  1981     AS REPAIR OF NASAL SEPTUM       NASAL/SINUS POLYPECTOMY  2017    Polyps were  benign     TONSILLECTOMY       TONSILLECTOMY  1981     TUBAL LIGATION         Active Meds:  Current Outpatient Prescriptions   Medication     levothyroxine (SYNTHROID) 25 MCG tablet     meloxicam (MOBIC) 7.5 MG tablet     amitriptyline (ELAVIL) 10 MG tablet     famotidine (PEPCID) 20 MG tablet     omeprazole (PRILOSEC) 20 MG CR capsule     ondansetron (ZOFRAN-ODT) 4 MG ODT tab     metoclopramide (REGLAN) 5 MG tablet     rizatriptan (MAXALT-MLT) 5 MG ODT tab     tiZANidine (ZANAFLEX) 4 MG tablet     buprenorphine (BUTRANS) 10 MCG/HR WK patch     EPINEPHrine (EPIPEN/ADRENACLICK/OR ANY BX GENERIC EQUIV) 0.3 MG/0.3ML injection 2-pack     albuterol (PROAIR HFA/PROVENTIL HFA/VENTOLIN HFA) 108 (90 BASE) MCG/ACT Inhaler     fluticasone (FLONASE) 50 MCG/ACT spray     COMPRESSION STOCKINGS     SUMAtriptan (IMITREX STATDOSE) 6 MG/0.5ML pen injector kit     ketamine, KETALAR, 5%-gabapentin, NEURONTIN, 8%-lidocaine 2.5% 5%-8% GEL topical PLO cream     fluticasone-salmeterol (ADVAIR DISKUS) 250-50 MCG/DOSE diskus inhaler     cetirizine (ZYRTEC) 10 MG tablet     polyethylene glycol (MIRALAX/GLYCOLAX) powder     Multiple Vitamins-Minerals (MULTIVITAMIN PO)     Probiotic Product (PROBIOTIC DAILY PO)     traMADol (ULTRAM) 50 MG tablet     etodolac (LODINE) 400 MG tablet     [DISCONTINUED] levothyroxine (SYNTHROID) 25 mcg/mL SUSP     No current facility-administered medications for this visit.         Allergies:  Bee venom; Chicken-derived products (egg); Gabapentin; Gluten meal; Lactose; Milk protein extract; Topiramate; Wheat; and Wheat bran    Family History:  family history includes Asthma in her sister and son; CANCER in her father; Connective Tissue Disorder in her mother and sister; DIABETES in her maternal grandmother, paternal grandfather, and paternal grandmother; HEART DISEASE in her maternal grandmother; Hypertension in her maternal grandmother; Migraines in her father, sister, and sister; Thyroid Disease in her  "sister.    Social History:  Social History   Substance Use Topics     Smoking status: Former Smoker     Packs/day: 0.20     Years: 10.00     Types: Cigarettes     Start date: 1/1/1991     Quit date: 12/1/2013     Smokeless tobacco: Never Used      Comment: I smoked on and off during specified dates.     Alcohol use Yes      Comment: couple per year   she has 5 children, all born by vaginal deliveries  She is ,  She is not currently working    Physical Exam:  Vitals: /86 (BP Location: Right arm, Patient Position: Sitting, Cuff Size: Adult Regular)  Pulse 118  Temp 97.7  F (36.5  C) (Oral)  Resp 18  Ht 1.676 m (5' 5.98\")  Wt 91.2 kg (201 lb)  SpO2 99%  BMI 32.46 kg/m2  Constitutional: Alert, oriented, pleasant, no acute distress  Head: Normocephalic, atraumatic  Eyes: Extra-ocular movements intact, pupils equally round and reactivebilaterally, no scleral icterus  ENT: Oropharynx clear, moist mucus membranes, good dentition  Neck: Wearing cervical collar, supple, tender in occipital region bilat  Cardiovascular: Regular rate and rhythm, no murmurs, rubs or gallops, peripheral pulses full/symmetric  Respiratory: Good air movement bilaterally, lungs clear, no wheezes/rales/rhonchi  Musculoskeletal: No edema, normal muscle tone, normal gait, no head/neck  Tenderness, ROM neck intact  Neurologic: Alert and oriented, cranial nerves 2-12 intact, nonfocal exam  Psychiatric: normal mentation, affect and mood      Conflicting answers have been found for some questions. Please document the patient's answers manually.     Cynthia Leach MD    "

## 2017-09-26 NOTE — NURSING NOTE
"Chief Complaint   Patient presents with     Consult     Tom-Danlos syndrome        Initial /86 (BP Location: Right arm, Patient Position: Sitting, Cuff Size: Adult Regular)  Pulse 118  Temp 97.7  F (36.5  C) (Oral)  Resp 18  Ht 1.676 m (5' 5.98\")  Wt 91.2 kg (201 lb)  SpO2 99%  BMI 32.46 kg/m2 Estimated body mass index is 32.46 kg/(m^2) as calculated from the following:    Height as of this encounter: 1.676 m (5' 5.98\").    Weight as of this encounter: 91.2 kg (201 lb).  Medication Reconciliation: complete     Elise Hills CMA  9/26/2017 12:37 PM        "

## 2017-09-28 ENCOUNTER — OFFICE VISIT (OUTPATIENT)
Dept: SLEEP MEDICINE | Facility: CLINIC | Age: 47
End: 2017-09-28
Attending: UROLOGY
Payer: COMMERCIAL

## 2017-09-28 VITALS
BODY MASS INDEX: 31.45 KG/M2 | DIASTOLIC BLOOD PRESSURE: 60 MMHG | OXYGEN SATURATION: 98 % | HEART RATE: 100 BPM | HEIGHT: 67 IN | WEIGHT: 200.4 LBS | SYSTOLIC BLOOD PRESSURE: 95 MMHG | RESPIRATION RATE: 18 BRPM

## 2017-09-28 DIAGNOSIS — Q07.00 ARNOLD-CHIARI MALFORMATION (H): ICD-10-CM

## 2017-09-28 DIAGNOSIS — R35.1 NOCTURIA: ICD-10-CM

## 2017-09-28 DIAGNOSIS — R06.83 SNORING: ICD-10-CM

## 2017-09-28 DIAGNOSIS — F51.04 CHRONIC INSOMNIA: ICD-10-CM

## 2017-09-28 DIAGNOSIS — G47.30 OBSERVED SLEEP APNEA: Primary | ICD-10-CM

## 2017-09-28 PROCEDURE — 99244 OFF/OP CNSLTJ NEW/EST MOD 40: CPT | Performed by: PHYSICIAN ASSISTANT

## 2017-09-28 NOTE — NURSING NOTE
"Chief Complaint   Patient presents with     Sleep Problem     consult, Nocturia       Initial BP 95/60  Pulse 100  Resp 18  Ht 1.702 m (5' 7\")  Wt 90.9 kg (200 lb 6.4 oz)  SpO2 98%  BMI 31.39 kg/m2 Estimated body mass index is 31.39 kg/(m^2) as calculated from the following:    Height as of this encounter: 1.702 m (5' 7\").    Weight as of this encounter: 90.9 kg (200 lb 6.4 oz).  Medication Reconciliation: complete     ESS 13  Vilma Holguin    "

## 2017-09-28 NOTE — MR AVS SNAPSHOT
"              After Visit Summary   9/28/2017    Ada Welch    MRN: [id number]           Patient Information     Date Of Birth          1970        Visit Information        Provider Department      9/28/2017 8:30 AM Goltz, Bennett Ezra, PA-C [facility]        Today's Diagnoses     Observed sleep apnea    -  1    Nocturia        Snoring        Arnold-Chiari malformation (H)        Chronic insomnia          Care Instructions    MY TREATMENT INFORMATION FOR SLEEP APNEA-  Ada Welch    DOCTOR : Bennett Ezra Goltz, PA-C  SLEEP CENTER : [facility]     MY CONTACT NUMBER: [phone]    Am I having a sleep study at a sleep center?  Make sure you have an appointment for the study before you leave!    Am I having a home sleep study?  Watch this video:  [email or web address].com/watch?v=[email or web address]_[email or web address]    Frequently asked questions:  1. What is Obstructive Sleep Apnea (ABHINAV)? ABHINAV is the most common type of sleep apnea. Apnea means, \"without breath.\"  Apnea is most often caused by narrowing or collapse of the upper airway as muscles relax during sleep.   Almost everyone has occasional apneas. Most people with sleep apnea have had brief interruptions at night frequently for many years.  The severity of sleep apnea is related to how frequent and severe the events are.   2. What are the consequences of ABHINAV? Symptoms include: feeling sleepy during the day, snoring loudly, gasping or stopping of breathing, trouble sleeping, and occasionally morning headaches or heartburn at night.  Sleepiness can be serious and even increase the risk of falling asleep while driving. Other health consequences may include development of high blood pressure and other cardiovascular disease in persons who are susceptible. Untreated ABHINAV can contribute to heart disease, stroke and diabetes.   3. What are the treatment options? In most situations, sleep apnea is a lifelong disease that must " be managed with daily therapy. Medications are not effective for sleep apnea and surgery is generally not considered until other therapies have been tried. Your treatment is your choice . Continuous Positive Airway (CPAP) works right away and is the therapy that is effective in nearly everyone. An oral device to hold your jaw forward is usually the next most reliable option. Other options include postioning devices (to keep you off your back), weight loss, and surgery including a tongue pacing device. There is more detail about some of these options below.    Important tips for using CPAP and similar devices   Know your equipment:  CPAP is continuous positive airway pressure that prevents obstructive sleep apnea by keeping the throat from collapsing while you are sleeping. In most cases, the device is  smart  and can slowly self-adjusts if your throat collapses and keeps a record every day of how well you are treated-this information is available to you and your care team.  BPAP is bilevel positive airway pressure that keeps your throat open and also assists each breath with a pressure boost to maintain adequate breathing.  Special kinds of BPAP are used in patients who have inadequate breathing from lung or heart disease. In most cases, the device is  smart  and can slowly self-adjusts to assist breathing. Like CPAP, the device keeps a record of how well you are treated.  Your mask is your connection to the device. You get to choose what feels most comfortable and the staff will help to make sure if fits. Here: are some examples of the different masks that are available:       Key points to remember on your journey with sleep apnea:  1. Sleep study.  PAP devices often need to be adjusted during a sleep study to show that they are effective and adjusted right.  2. Good tips to remember: Try wearing just the mask during a quiet time during the day so your body adapts to wearing it. A humidifier is recommended for  comfort in most cases to prevent drying of your nose and throat. Allergy medication from your provider may help you if you are having nasal congestion.  3. Getting settled-in. It takes more than one night for most of us to get used to wearing a mask. Try wearing just the mask during a quiet time during the day so your body adapts to wearing it. A humidifier is recommended for comfort in most cases. Our team will work with you carefully on the first day and will be in contact within 4 days and again at 2 and 4 weeks for advice and remote device adjustments. Your therapy is evaluated by the device each day.   4. Use it every night. The more you are able to sleep naturally for 7-8 hours, the more likely you will have good sleep and to prevent health risks or symptoms from sleep apnea. Even if you use it 4 hours it helps. Occasionally all of us are unable to use a medical therapy, in sleep apnea, it is not dangerous to miss one night.   5. Communicate. Call our skilled team on the number provided on the first day if your visit for problems that make it difficult to wear the device. Over 2 out of 3 patients can learn to wear the device long-term with help from our team. Remember to call our team or your sleep providers if you are unable to wear the device as we may have other solutions for those who cannot adapt to mask CPAP therapy. It is recommended that you sleep your sleep provider within the first 3 months and yearly after that if you are not having problems.   Take care of your equipment. Make sure you clean your mask and tubing using directions every day and that your filter and mask are replaced as recommended or if they are not working.     BESIDES CPAP, WHAT OTHER THERAPIES ARE THERE?    Positioning Device  Positioning devices are generally used when sleep apnea is mild and only occurs on your back.This example shows a pillow that straps around the waist. It may be appropriate for those whose sleep study shows  milder sleep apnea that occurs primarily when lying flat on one's back. Preliminary studies have shown benefit but effectiveness at home may need to be verified by a home sleep test. These devices are generally not covered by medical insurance.    Oral Appliance  What is oral appliance therapy?  An oral appliance device fits on your teeth at night like a retainer used after having braces. The device is made by a specialized dentist and requires several visits over 1-2 months before a manufactured device is made to fit your teeth and is adjusted to prevent your sleep apnea. Once an oral device is working properly, snoring should be improved. A home sleep test may be recommended at that time if to determine whether the sleep apnea is adequately treated.       Some things to remember:  -Oral devices are often, but not always, covered by your medical insurance. Be sure to check with your insurance provider.   -If you are referred for oral therapy, you will be given a list of specialized dentists to consider or you may choose to visit the Web site of the American Academy of Dental Sleep Medicine  -Oral devices are less likely to work if you have severe sleep apnea or are extremely overweight.     More detailed information  An oral appliance is a small acrylic device that fits over the upper and lower teeth  (similar to a retainer or a mouth guard). This device slightly moves jaw forward, which moves the base of the tongue forward, opens the airway, improves breathing for effective treat snoring and obstructive sleep apnea in perhaps 7 out of 10 people .  The best working devices are custom-made by a dental device  after a mold is made of the teeth 1, 2, 3.  When is an oral appliance indicated?  Oral appliance therapy is recommended as a first-line treatment for patients with primary snoring, mild sleep apnea, and for patients with moderate sleep apnea who prefer appliance therapy to use of CPAP4, 5. Severity  of sleep apnea is determined by sleep testing and is based on the number of respiratory events per hour of sleep.   How successful is oral appliance therapy?  The success rate of oral appliance therapy in patients with mild sleep apnea is 75-80% while in patients with moderate sleep apnea it is 50-70%. The chance of success in patients with severe sleep apnea is 40-50%. The research also shows that oral appliances have a beneficial effect on the cardiovascular health of ABHINAV patients at the same magnitude as CPAP therapy7.  Oral appliances should be a second-line treatment in cases of severe sleep apnea, but if not completely successful then a combination therapy utilizing CPAP plus oral appliance therapy may be effective. Oral appliances tend to be effective in a broad range of patients although studies show that the patients who have the highest success are females, younger patients, those with milder disease, and less severe obesity. 3, 6.   Finding a dentist that practices dental sleep medicine  Specific training is available through the American Academy of Dental Sleep Medicine for dentists interested in working in the field of sleep. To find a dentist who is educated in the field of sleep and the use of oral appliances, near you, visit the Web site of the American Academy of Dental Sleep Medicine.    References  1. Flaco, et al. Objectively measured vs self-reported compliance during oral appliance therapy for sleep-disordered breathing. Chest 2013; 144(5): 9380-5732.  2. Abdi et al. Objective measurement of compliance during oral appliance therapy for sleep-disordered breathing. Thorax 2013; 68(1): 91-96.  3. Jesse, et al. Mandibular advancement devices in 620 men and women with ABHINAV and snoring: tolerability and predictors of treatment success. Chest 2004; 125: 3072-8281.  4. Toney et al. Oral appliances for snoring and ABHINAV: a review. Sleep 2006; 29: 244-262.  5. Remi et al. Oral  appliance treatment for ABHINAV: an update. J Clin Sleep Med 2014; 10(2): 215-227.  6. Kayce et al. Predictors of OSAH treatment outcome. J Dent Res 2007; 86: 4091-0596.    Weight Loss:    Weight loss is a long-term strategy that may improve sleep apnea in some patients.    Weight management is a personal decision.  If you are interested in exploring weight loss strategies, the following discussion covers the impact on weight loss on sleep apnea and the approaches that may be successful.    Weight loss decreases severity of sleep apnea in most people with obesity. For those with mild obesity who have developed snoring with weight gain, even 15-30 pound weight loss can improve and occasionally eliminate sleep apnea.  Structured and life-long dietary and health habits are necessary to lose weight and keep healthier weight levels.     Though there may be significant health benefits from weight loss, long-term weight loss is very difficult to achieve- studies show success with dietary management in less than 10% of people. In addition, substantial weight loss may require years of dietary control and may be difficult if patients have severe obesity. In these cases, surgical management may be considered.  Finally, older individuals who have tolerated obesity without health complications may be less likely to benefit from weight loss strategies.      Your BMI is Body mass index is 31.39 kg/(m^2).  Weight management is a personal decision.  If you are interested in exploring weight loss strategies, the following discussion covers the approaches that may be successful. Body mass index (BMI) is one way to tell whether you are at a healthy weight, overweight, or obese. It measures your weight in relation to your height.  A BMI of 18.5 to 24.9 is in the healthy range. A person with a BMI of 25 to 29.9 is considered overweight, and someone with a BMI of 30 or greater is considered obese. More than two-thirds of American adults  are considered overweight or obese.  Being overweight or obese increases the risk for further weight gain. Excess weight may lead to heart disease and diabetes.  Creating and following plans for healthy eating and physical activity may help you improve your health.  Weight control is part of healthy lifestyle and includes exercise, emotional health, and healthy eating habits. Careful eating habits lifelong are the mainstay of weight control. Though there are significant health benefits from weight loss, long-term weight loss with diet alone may be very difficult to achieve- studies show long-term success with dietary management in less than 10% of people. Attaining a healthy weight may be especially difficult to achieve in those with severe obesity. In some cases, medications, devices and surgical management might be considered.  What can you do?  If you are overweight or obese and are interested in methods for weight loss, you should discuss this with your provider.     Consider reducing daily calorie intake by 500 calories.     Keep a food journal.     Avoiding skipping meals, consider cutting portions instead.    Diet combined with exercise helps maintain muscle while optimizing fat loss. Strength training is particularly important for building and maintaining muscle mass. Exercise helps reduce stress, increase energy, and improves fitness. Increasing exercise without diet control, however, may not burn enough calories to loose weight.       Start walking three days a week 10-20 minutes at a time    Work towards walking thirty minutes five days a week     Eventually, increase the speed of your walking for 1-2 minutes at time    In addition, we recommend that you review healthy lifestyles and methods for weight loss available through the National Institutes of Health patient information sites:  http://win.niddk.nih.gov/publications/index.htm    And look into health and wellness programs that may be available  through your health insurance provider, employer, local community center, or aamir club.    Weight management plan: Patient was referred to their PCP to discuss a diet and exercise plan.    Surgery:    Surgery for obstructive sleep apnea is considered generally only when other therapies fail to work. Surgery may be discussed with you if you are having a difficult time tolerating CPAP and or when there is an abnormal structure that requires surgical correction.  Nose and throat surgeries often enlarge the airway to prevent collapse.  Most of these surgeries create pain for 1-2 weeks and up to half of the most common surgeries are not effective throughout life.  You should carefully discuss the benefits and drawbacks to surgery with your sleep provider and surgeon to determine if it is the best solution for you.   More information  Surgery for ABHINAV is directed at areas that are responsible for narrowing or complete obstruction of the airway during sleep.  There are a wide range of procedures available to enlarge and/or stabilize the airway to prevent blockage of breathing in the three major areas where it can occur: the palate, tongue, and nasal regions.  Successful surgical treatment depends on the accurate identification of the factors responsible for obstructive sleep apnea in each person.  A personalized approach is required because there is no single treatment that works well for everyone.  Because of anatomic variation, consultation with an examination by a sleep surgeon is a critical first step in determining what surgical options are best for each patient.  In some cases, examination during sedation may be recommended in order to guide the selection of procedures.  Patients will be counseled about risks and benefits as well as the typical recovery course after surgery. Surgery is typically not a cure for a person s ABHINAV.  However, surgery will often significantly improve one s ABHINAV severity (termed  success  rate ).  Even in the absence of a cure, surgery will decrease the cardiovascular risk associated with OSA7; improve overall quality of life8 (sleepiness, functionality, sleep quality, etc).  Palate Procedures:  Patients with ABHINAV often have narrowing of their airway in the region of their tonsils and uvula.  The goals of palate procedures are to widen the airway in this region as well as to help the tissues resist collapse.  Modern palate procedure techniques focus on tissue conservation and soft tissue rearrangement, rather than tissue removal.  Often the uvula is preserved in this procedure. Residual sleep apnea is common in patient after pharyngoplasty with an average reduction in sleep apnea events of 33%2.    Tongue Procedures:  ExamWhile patients are awake, the muscles that surround the throat are active and keep this region open for breathing. These muscles relax during sleep, allowing the tongue and other structures to collapse and block breathing.  There are several different tongue procedures available.  Selection of a tongue base procedure depends on characteristics seen on physical exam.  Generally, procedures are aimed at removing bulky tissues in this area or preventing the back of the tongue from falling back during sleep.  Success rates for tongue surgery range from 50-62%3.  Hypoglossal Nerve Stimulation:  Hypoglossal nerve stimulation has recently received approval from the [place] Food and Drug Administration for the treatment of obstructive sleep apnea.  This is based on research showing that the system was safe and effective in treating sleep apnea6.  Results showed that the median AHI score decreased 68%, from 29.3 to 9.0. This therapy uses an implant system that senses breathing patterns and delivers mild stimulation to airway muscles, which keeps the airway open during sleep.  The system consists of three fully implanted components: a small generator (similar in size to a pacemaker), a  breathing sensor, and a stimulation lead.  Using a small handheld remote, a patient turns the therapy on before bed and off upon awakening.    Candidates for this device must be greater than 22 years of age, have moderate to severe ABHINAV (AHI between 20-65), BMI less than 32, have tried CPAP/oral appliance without success, and have appropriate upper airway anatomy (determined by a sleep endoscopy performed by Dr. Amato).    Hypoglossal Nerve Stimulation Pathway:    The sleep surgeon s office will work with the patient through the insurance prior-authorization process (including communications and appeals).    Nasal Procedures:  Nasal obstruction can interfere with nasal breathing during the day and night.  Studies have shown that relief of nasal obstruction can improve the ability of some patients to tolerate positive airway pressure therapy for obstructive sleep apnea1.  Treatment options include medications such as nasal saline, topical corticosteroid and antihistamine sprays, and oral medications such as antihistamines or decongestants. Non-surgical treatments can include external nasal dilators for selected patients. If these are not successful by themselves, surgery can improve the nasal airway either alone or in combination with these other options.  Combination Procedures:  Combination of surgical procedures and other treatments may be recommended, particularly if patients have more than one area of narrowing or persistent positional disease.  The success rate of combination surgery ranges from 66-80%2,3.    References  1. Flakito HERRMANN. The Role of the Nose in Snoring and Obstructive Sleep Apnoea: An Update.  Eur Arch Otorhinolaryngol. 2011; 268: 1365-73.  2.  Kenyatta SM; Jenn JA; Carrillo JR; Pallanch JF; Asya MB; Gavin SG; Gene RUBIN. Surgical modifications of the upper airway for obstructive sleep apnea in adults: a systematic review and meta-analysis. SLEEP 2010;33(10):3307-7933. Yvon SUTTON. Hypopharyngeal  surgery in obstructive sleep apnea: an evidence-based medicine review.  Arch Otolaryngol Head Neck Surg. 2006 Feb;132(2):206-13.  3. Kin YH1, Nitin Y, German JOCELINE. The efficacy of anatomically based multilevel surgery for obstructive sleep apnea. Otolaryngol Head Neck Surg. 2003 Oct;129(4):327-35.  4. Yvon SUTTON, Goldberg A. Hypopharyngeal Surgery in Obstructive Sleep Apnea: An Evidence-Based Medicine Review. Arch Otolaryngol Head Neck Surg. 2006 Feb;132(2):206-13.  5. Calos PALAFOX et al. Upper-Airway Stimulation for Obstructive Sleep Apnea.  N Engl J Med. 2014 Jan 9;370(2):139-49.  6. Venkat Y et al. Increased Incidence of Cardiovascular Disease in Middle-aged Men with Obstructive Sleep Apnea. Am J Respir Crit Care Med; 2002 166: 159-165  7. Kourtney IVERSON et al. Studying Life Effects and Effectiveness of Palatopharyngoplasty (SLEEP) study: Subjective Outcomes of Isolated Uvulopalatopharyngoplasty. Otolaryngol Head Neck Surg. 2011; 144: 623-631.  General recommendations for sleep problems (Insomnia)  Allow 2-4 weeks to see results     Establish a regular sleep schedule    Most people only need 7-8 hours of sleep.  Don't be in bed longer than you need     to sleep or you will end up spending more time awake in bed. This trains your    brain to think of the bed as a place to not sleep.  Go to bed at same time each night   Get up at same time each day - Set an alarm everyday (even weekends). This is one of    the most important tips. It prevents you from relying on your insomnia to get you    up on time for your day. That actually reinforces insomnia. It also will help your    body get into a pattern where you start feeling tired at a consistent time each    night.  The body functions best when you keep a consistent routine.  Avoid sleeping-in and napping. Anytime you sleep during the day, you will be less tired at    night. You may be tired enough to fall asleep, but you will wake more in the    middle of the night because you  will have met your sleep need before the night is    done.   Cut down time in bed (if not asleep, get up)- Use your bed only for sleep and sex    Anytime you spend time in bed doing activities other than sleep (reading,    watching TV, working, playing on the computer or phone, or even just laying in    bed trying to sleep), you are training  your brain to think of the bed as a place to    do activities other than sleep. If you are not falling asleep within 20-30 minutes,    get out of bed. While out of bed, avoid bright lights. Avoid work or chores. Being    productive in the middle of the night reinforces waking up at night. Find relaxing,    not particularly entertaining activities like reading, listening to music, or relaxation    exercises. Go back to bed if you start feeling groggy, or after about 30 minutes,    even if not feeling very tired. Sometimes, just getting out of bed stops the pattern    of getting frustrated about laying in bed not sleeping, and that can help you fall    asleep.   Avoid trying to force yourself to sleep- sleep is not like everything else. The harder you    work at most things, the more you can accomplish. The harder you work at    sleep, the less you will sleep.     Make the bedroom comfortable - quiet, dark and cool are better. Consider ear plugs    (silicon). Use dark blinds or wear an eye mask if needed     Make a relaxing routine prior to bedtime  Relaxation exercises:   Progressive muscle relaxation: Relax each muscle group individually    Begin with your feet, flex, then relax. Try to imagine your feet feeling heavy and sinking into the bed. Move to your calves, do the same thing. Work through each muscle group toward your head.    Relaxing Mental Imagery: Try to imagine a trip that you took and found relaxing, or imagine a day at the beach. Try to walk yourself through the day in your mind as if you were dreaming it. Try to imagine sensing the different experiences, such as  feeling sand between your toes, the heat of the sun on your skin, seeing the waves crashing the shore, the smell of the salt water, etc.     Deal with your worries before bedtime    Set aside a worry time around dinner time for 10-15 minutes. Write down the    things that are on your mind. Plan time in the coming days to address those    issues. Brainstorm ideas on how you will deal with them. Try to identify issues    that are out of your control, and try to let those issues go.  Listen to relaxation tapes   Classical Music or Nature sounds   Back Massage   Get regular exercise each day (at least 1-2 hours before bedtime)   Take medications only as directed   Eat a light bedtime snack or warm drink   Warm milk   Warm herbal tea (non-caffeinated)       Things to avoid   No overstimulating activities just before bed   No competitive games before bedtime   No exciting television programs before bedtime   Avoid caffeine after lunchtime   Avoid chocolate   Do not use alcohol to induce sleep (worsens Insomnia)   Do not take someone else's sleeping pills   Do not look at the clock when awakening   Do not turn on light when getting up to use bathroom, use a nightlight     Online Programs     www.Umthunzi (pronounced shut eye). There is a fee for this program. Enter the code  Marana  if you decide to enroll in this program.      www.sleepIO.com (pronounced sleep ee oh). There is a fee for this program. Enter the code  Marana  if you decide to enroll in this program.     Suggested Resources  Insomnia Treatment Books     Overcoming Insomnia by Clinton Wilson and Seema Kiser (2008)    No More Sleepless Nights by Soy Lema and Chacha Harden (1996)    Say Shaneka to Insomnia by Corey Abernathy (2009)    The Insomnia Workbook by Seema Odom and Maverick Whitten (2009)    The Insomnia Answer by Franklyn Vargas and Austin Montgomery (2006)      Stress Management and Relaxation Books    The Relaxation and Stress  Reduction Workbook by Dara Romeo and Anthony Mijares (2008)    Stress Management Workbook: Techniques and Self-Assessment Procedures by Lauren Cassidy and Daniel Adamson (1997)    A Mindfulness-Based Stress Reduction Workbook by Fernando Agosto and Eleni Alberto (2010)    The Complete Stress Management Workbook by Keven Lopes, Benjamin Dumas and Luis E Tirado (1996)    Assert Yourself by Lety Soria and Higinio Soria (1977)    Relaxation Resources for Computer Download   These websites offer resources to help you relax. This list is for information only. [facility] is not responsible for the quality of services or the actions of any person or organization.  Progressive Muscle Relaxation (PMR):     http://www.OpenSpark/progressive-muscle-relaxation-exercise.html     http://studentsupport.[facility]/counseling/resources/self-help/relaxation-and-stress-management/   Deep Breathing Exercises:    http://www.OpenSpark/breathing-awareness.html     Meditation:     wwwMedia Machines    www.theoragenicsguidedoragenicsmeditation-site.com You may have to pay for some of these resources.    Guided Imagery:    http://www.OpenSpark/guided-imagery-scripts.html     http://Traka/library/cxzxxcoibw-apdaax-gkxjszt/     Counseling / Behavioral Health  [facility]  Visit www.[email or web address].org or call [phone] to find a clinic close to you.  Or call [phone] for [facility].            Follow-ups after your visit        Follow-up notes from your care team     Return in about 2 weeks (around 10/12/2017) for Sleep Study Review.      Your next 10 appointments already scheduled     Oct 02, 2017  8:00 AM CDT   NM GASTRIC EMPTYING UU UR MG with UUNM2   [facility], [place], Nuclear Medicine ([facility])    [address] [facility] [id number] [phone]            Please bring a list of your medicines to the exam. (Include vitamins, minerals and over-the-counter drugs.) You should wear comfortable clothes. Leave your valuables at home. Please bring related prior results and films.  Tell your doctor:   If you are breastfeeding or may be pregnant.   If you have had a barium test within the past few days. Barium may change the results of certain exams.  No food or drink (including water) for 4 hours prior to the exam.  Please call your Imaging Department at your exam site with any questions.            Oct 02, 2017 12:30 PM CDT   (Arrive by 12:15 PM)   Return Visit with FIDELINA Ridley [facility] Neurology ([facility])    [address] [place] [id number]   [phone]            Oct 04, 2017  8:00 AM CDT   (Arrive by 7:45 AM)   New Patient Visit with Seth Galeano MD   [facility] Neurology ([facility])    [address] [place] [id number]   [phone]            Oct 04, 2017  1:00 PM CDT   (Arrive by 12:45 PM)   New Patient Visit with Juana Griffith MD   [facility]enterology [facility] ([facility])    [address] [place] [address]   [phone]            Oct 05, 2017  3:30 PM CDT   (Arrive by 3:15 PM)   Implantable Loop Recorder with  Cv Device 1   [facility] ([facility])    [address] [place] [id number] [phone]            Oct 05, 2017  4:00 PM CDT   (Arrive by 3:45 PM)   NEW ARRHYTHMIA with Kirk Russell MD   [facility])    [address] [place] [id number] [phone]            Oct 06, 2017  9:00 AM CDT   XR VIDEO SPEECH EVALUATION WITH ESOPHAGRAM with UCXR2, UC GIGU RAD   [facility] Xray ([facility])    [address] [place] [id number] [phone]           Please bring a list of your current medicines to your exam. (Include vitamins, minerals and over-the-counter medicines.) Leave your valuables at home.  Tell the doctor if there is a chance you could be pregnant.  Do not eat for 4 hours before the exam. Keep drinking clear liquids until 2 hours before the exam.  You may take pain medicine (with a sip of water) up to 4 hours before the exam.  Do not swallow any other medicines unless your doctor tells you to. Talk to your doctor to be sure it s safe to stop your medicines.  Please call the Imaging Department at your exam site with any questions.            Oct 06, 2017  9:00 AM CDT   Video Swallow with JAYANT Shaw   [facility] ([facility])    [address] [place] [id number] [phone]           Please check in for this visit in Imaging on the 1st floor.            Oct 27, 2017  9:20 AM CDT   (Arrive by 9:05 AM)   Return Visit with Faiza Martinez MD   [facility] ([facility])    [address] [place] [id number] [phone]              Future tests that were ordered for you today     Open Future Orders        Priority Expected Expires Ordered    Comprehensive Sleep Study Routine  3/27/2018 9/28/2017            Who to contact     If you have questions or need follow up information about today's clinic visit or your schedule please contact [facility] directly at [phone].  Normal or non-critical lab and imaging results will be communicated to you by MyChart, letter or phone within 4 business days after the clinic has received the results. If you do not hear from us within 7 days, please contact the clinic through MyChart or phone. If you have a critical or abnormal lab result, we will  "notify you by phone as soon as possible.  Submit refill requests through Correlsense or call your pharmacy and they will forward the refill request to us. Please allow 3 business days for your refill to be completed.          Additional Information About Your Visit        SourceYourCityhart Information     Correlsense gives you secure access to your electronic health record. If you see a primary care provider, you can also send messages to your care team and make appointments. If you have questions, please call your primary care clinic.  If you do not have a primary care provider, please call [phone] and they will assist you.        Care EveryWhere ID     This is your Care EveryWhere ID. This could be used by other organizations to access your [facility] medical records  [id number]        Your Vitals Were     Pulse Respirations Height Pulse Oximetry BMI (Body Mass Index)       100 18 1.702 m (5' 7\") 98% 31.39 kg/m2        Blood Pressure from Last 3 Encounters:   09/28/17 95/60   09/26/17 122/86   09/21/17 133/88    Weight from Last 3 Encounters:   09/28/17 90.9 kg (200 lb 6.4 oz)   09/26/17 91.2 kg (201 lb)   09/21/17 91.9 kg (202 lb 8 oz)              We Performed the Following     SLEEP EVALUATION & MANAGEMENT REFERRAL - ADULT        Primary Care Provider Office Phone # Fax #    Ellyn Rivera [phone]       [address] [place] [phone]  [facility] [address]        Equal Access to Services     CHRISTINA GUY AH: Hadii kia romoo Bryon, waaxda luqadaha, qaybta kaalmada pawanyada, odette tang. So [facility] [phone].    ATENCIÓN: Si habla español, tiene a sweeney disposición servicios gratuitos de asistencia lingüística. Llame al [phone].    We comply with applicable federal civil rights laws and [place] laws. We do not discriminate on the basis of race, color, national origin, age, disability sex, sexual orientation or gender identity.            Thank you!     Thank you for " choosing [facility]  for your care. Our goal is always to provide you with excellent care. Hearing back from our patients is one way we can continue to improve our services. Please take a few minutes to complete the written survey that you may receive in the mail after your visit with us. Thank you!             Your Updated Medication List - Protect others around you: Learn how to safely use, store and throw away your medicines at www.disposemymeds.org.          This list is accurate as of: 9/28/17 10:05 AM.  Always use your most recent med list.                   Brand Name Dispense Instructions for use Diagnosis    ADVAIR DISKUS 250-50 MCG/DOSE diskus inhaler   Generic drug:  fluticasone-salmeterol      Inhale 1 puff into the lungs 2 times daily as needed        albuterol 108 (90 BASE) MCG/ACT Inhaler    PROAIR HFA/PROVENTIL HFA/VENTOLIN HFA     Inhale 2 puffs into the lungs        amitriptyline 10 MG tablet    ELAVIL    90 tablet    Take 3 tablets (30 mg) by mouth At Bedtime    Chronic migraine without aura without status migrainosus, not intractable       buprenorphine 10 MCG/HR WK patch    BUTRANS    4 patch    Place 1 patch onto the skin every 7 days    Postlaminectomy syndrome of lumbar region       cetirizine 10 MG tablet    zyrTEC     Take 10 mg by mouth daily        COMPRESSION STOCKINGS     3 each    1 each daily 20-30 mmHg Thigh Length measure and fit, color and style per patient preference. Doff n jose per need.    Orthostatic hypotension       EPINEPHrine 0.3 MG/0.3ML injection 2-pack    EPIPEN/ADRENACLICK/or ANY BX GENERIC EQUIV     Inject 0.3 mg into the muscle        etodolac 400 MG tablet    LODINE     Take 400 mg by mouth        famotidine 20 MG tablet    PEPCID    90 tablet    Take 1 tablet (20 mg) by mouth At Bedtime    Hiatal hernia, Mast cell disorder       fluticasone 50 MCG/ACT spray    FLONASE     2 sprays        ketamine (KETALAR) 5%-gabapentin (NEURONTIN) 8%-lidocaine  2.5% 5%-8% Gel topical PLO cream     30 g    Apply 1 g topically 3 times daily as needed    Postlaminectomy syndrome of lumbosacral region, Postlaminectomy syndrome of lumbar region       meloxicam 7.5 MG tablet    MOBIC    15 tablet    Take 1 tablet (7.5 mg) by mouth daily    Chronic migraine without aura without status migrainosus, not intractable       metoclopramide 5 MG tablet    REGLAN    20 tablet    Take 1 tablet (5 mg) by mouth every 6 hours as needed    Intractable chronic migraine without aura and with status migrainosus       MULTIVITAMIN PO      Take by mouth daily        omeprazole 20 MG CR capsule    priLOSEC    90 capsule    Take 1 capsule (20 mg) by mouth daily    Hiatal hernia       ondansetron 4 MG ODT tab    ZOFRAN-ODT    60 tablet    Take 1-2 tablets (4-8 mg) by mouth every 12 hours as needed for nausea    Migraine without status migrainosus, not intractable, unspecified migraine type       polyethylene glycol powder    MIRALAX/GLYCOLAX          PROBIOTIC DAILY PO      Take by mouth 2 times daily        rizatriptan 5 MG ODT tab    MAXALT-MLT    20 tablet    TAKE 1-2 TABLETS (5-10 MG) BY MOUTH AT ONSET OF HEADACHE FOR MIGRAINE (MAX 30 MG IN 24 HOURS)    Migraine without status migrainosus, not intractable, unspecified migraine type       SUMAtriptan 6 MG/0.5ML pen injector kit    IMITREX STATDOSE    2 kit    Inject 0.5 mLs (6 mg) Subcutaneous at onset of headache for migraine (may repeat once after 2 hrs) May repeat in 1 hour. Max 12 mg/24 hours.    Chronic migraine without aura without status migrainosus, not intractable       SYNTHROID 25 MCG tablet   Generic drug:  levothyroxine      Take 25 mcg by mouth daily        tiZANidine 4 MG tablet    ZANAFLEX    90 tablet    Take 2 tablets (8 mg) by mouth 3 times daily as needed for muscle spasms    Post laminectomy syndrome       traMADol 50 MG tablet    ULTRAM    15 tablet    Take 1 tablet (50 mg) by mouth every 6 hours as needed for pain maximum 4  tablet(s) per day    Migraine with aura and without status migrainosus, not intractable

## 2017-09-28 NOTE — PATIENT INSTRUCTIONS
"MY TREATMENT INFORMATION FOR SLEEP APNEA-  Ada Welch    DOCTOR : Bennett Ezra Goltz, PA-C  SLEEP CENTER : [facility]     MY CONTACT NUMBER: [phone]    Am I having a sleep study at a sleep center?  Make sure you have an appointment for the study before you leave!    Am I having a home sleep study?  Watch this video:  https://www.CurTran.com/watch?v=CteI_GhyP9g&[email or web address]    Frequently asked questions:  1. What is Obstructive Sleep Apnea (ABHINAV)? ABHINAV is the most common type of sleep apnea. Apnea means, \"without breath.\"  Apnea is most often caused by narrowing or collapse of the upper airway as muscles relax during sleep.   Almost everyone has occasional apneas. Most people with sleep apnea have had brief interruptions at night frequently for many years.  The severity of sleep apnea is related to how frequent and severe the events are.   2. What are the consequences of ABHINAV? Symptoms include: feeling sleepy during the day, snoring loudly, gasping or stopping of breathing, trouble sleeping, and occasionally morning headaches or heartburn at night.  Sleepiness can be serious and even increase the risk of falling asleep while driving. Other health consequences may include development of high blood pressure and other cardiovascular disease in persons who are susceptible. Untreated ABHINAV can contribute to heart disease, stroke and diabetes.   3. What are the treatment options? In most situations, sleep apnea is a lifelong disease that must be managed with daily therapy. Medications are not effective for sleep apnea and surgery is generally not considered until other therapies have been tried. Your treatment is your choice . Continuous Positive Airway (CPAP) works right away and is the therapy that is effective in nearly everyone. An oral device to hold your jaw forward is usually the next most reliable option. Other options include postioning devices (to keep you off your back), weight loss, " and surgery including a tongue pacing device. There is more detail about some of these options below.    Important tips for using CPAP and similar devices   Know your equipment:  CPAP is continuous positive airway pressure that prevents obstructive sleep apnea by keeping the throat from collapsing while you are sleeping. In most cases, the device is  smart  and can slowly self-adjusts if your throat collapses and keeps a record every day of how well you are treated-this information is available to you and your care team.  BPAP is bilevel positive airway pressure that keeps your throat open and also assists each breath with a pressure boost to maintain adequate breathing.  Special kinds of BPAP are used in patients who have inadequate breathing from lung or heart disease. In most cases, the device is  smart  and can slowly self-adjusts to assist breathing. Like CPAP, the device keeps a record of how well you are treated.  Your mask is your connection to the device. You get to choose what feels most comfortable and the staff will help to make sure if fits. Here: are some examples of the different masks that are available:       Key points to remember on your journey with sleep apnea:  1. Sleep study.  PAP devices often need to be adjusted during a sleep study to show that they are effective and adjusted right.  2. Good tips to remember: Try wearing just the mask during a quiet time during the day so your body adapts to wearing it. A humidifier is recommended for comfort in most cases to prevent drying of your nose and throat. Allergy medication from your provider may help you if you are having nasal congestion.  3. Getting settled-in. It takes more than one night for most of us to get used to wearing a mask. Try wearing just the mask during a quiet time during the day so your body adapts to wearing it. A humidifier is recommended for comfort in most cases. Our team will work with you carefully on the first day and  will be in contact within 4 days and again at 2 and 4 weeks for advice and remote device adjustments. Your therapy is evaluated by the device each day.   4. Use it every night. The more you are able to sleep naturally for 7-8 hours, the more likely you will have good sleep and to prevent health risks or symptoms from sleep apnea. Even if you use it 4 hours it helps. Occasionally all of us are unable to use a medical therapy, in sleep apnea, it is not dangerous to miss one night.   5. Communicate. Call our skilled team on the number provided on the first day if your visit for problems that make it difficult to wear the device. Over 2 out of 3 patients can learn to wear the device long-term with help from our team. Remember to call our team or your sleep providers if you are unable to wear the device as we may have other solutions for those who cannot adapt to mask CPAP therapy. It is recommended that you sleep your sleep provider within the first 3 months and yearly after that if you are not having problems.   Take care of your equipment. Make sure you clean your mask and tubing using directions every day and that your filter and mask are replaced as recommended or if they are not working.     BESIDES CPAP, WHAT OTHER THERAPIES ARE THERE?    Positioning Device  Positioning devices are generally used when sleep apnea is mild and only occurs on your back.This example shows a pillow that straps around the waist. It may be appropriate for those whose sleep study shows milder sleep apnea that occurs primarily when lying flat on one's back. Preliminary studies have shown benefit but effectiveness at home may need to be verified by a home sleep test. These devices are generally not covered by medical insurance.    Oral Appliance  What is oral appliance therapy?  An oral appliance device fits on your teeth at night like a retainer used after having braces. The device is made by a specialized dentist and requires several  visits over 1-2 months before a manufactured device is made to fit your teeth and is adjusted to prevent your sleep apnea. Once an oral device is working properly, snoring should be improved. A home sleep test may be recommended at that time if to determine whether the sleep apnea is adequately treated.       Some things to remember:  -Oral devices are often, but not always, covered by your medical insurance. Be sure to check with your insurance provider.   -If you are referred for oral therapy, you will be given a list of specialized dentists to consider or you may choose to visit the Web site of the American Academy of Dental Sleep Medicine  -Oral devices are less likely to work if you have severe sleep apnea or are extremely overweight.     More detailed information  An oral appliance is a small acrylic device that fits over the upper and lower teeth  (similar to a retainer or a mouth guard). This device slightly moves jaw forward, which moves the base of the tongue forward, opens the airway, improves breathing for effective treat snoring and obstructive sleep apnea in perhaps 7 out of 10 people .  The best working devices are custom-made by a dental device  after a mold is made of the teeth 1, 2, 3.  When is an oral appliance indicated?  Oral appliance therapy is recommended as a first-line treatment for patients with primary snoring, mild sleep apnea, and for patients with moderate sleep apnea who prefer appliance therapy to use of CPAP4, 5. Severity of sleep apnea is determined by sleep testing and is based on the number of respiratory events per hour of sleep.   How successful is oral appliance therapy?  The success rate of oral appliance therapy in patients with mild sleep apnea is 75-80% while in patients with moderate sleep apnea it is 50-70%. The chance of success in patients with severe sleep apnea is 40-50%. The research also shows that oral appliances have a beneficial effect on the  cardiovascular health of ABHINAV patients at the same magnitude as CPAP therapy7.  Oral appliances should be a second-line treatment in cases of severe sleep apnea, but if not completely successful then a combination therapy utilizing CPAP plus oral appliance therapy may be effective. Oral appliances tend to be effective in a broad range of patients although studies show that the patients who have the highest success are females, younger patients, those with milder disease, and less severe obesity. 3, 6.   Finding a dentist that practices dental sleep medicine  Specific training is available through the American Academy of Dental Sleep Medicine for dentists interested in working in the field of sleep. To find a dentist who is educated in the field of sleep and the use of oral appliances, near you, visit the Web site of the American Academy of Dental Sleep Medicine.    References  1. Flaco et al. Objectively measured vs self-reported compliance during oral appliance therapy for sleep-disordered breathing. Chest 2013; 144(5): 7727-7888.  2. Abdi et al. Objective measurement of compliance during oral appliance therapy for sleep-disordered breathing. Thorax 2013; 68(1): 91-96.  3. Jesse, et al. Mandibular advancement devices in 620 men and women with ABHINAV and snoring: tolerability and predictors of treatment success. Chest 2004; 125: 7955-1457.  4. Toney, et al. Oral appliances for snoring and ABHINAV: a review. Sleep 2006; 29: 244-262.  5. Remi et al. Oral appliance treatment for ABHINAV: an update. J Clin Sleep Med 2014; 10(2): 215-227.  6. Kayce et al. Predictors of OSAH treatment outcome. J Dent Res 2007; 86: 6602-6570.    Weight Loss:    Weight loss is a long-term strategy that may improve sleep apnea in some patients.    Weight management is a personal decision.  If you are interested in exploring weight loss strategies, the following discussion covers the impact on weight loss on sleep apnea and  the approaches that may be successful.    Weight loss decreases severity of sleep apnea in most people with obesity. For those with mild obesity who have developed snoring with weight gain, even 15-30 pound weight loss can improve and occasionally eliminate sleep apnea.  Structured and life-long dietary and health habits are necessary to lose weight and keep healthier weight levels.     Though there may be significant health benefits from weight loss, long-term weight loss is very difficult to achieve- studies show success with dietary management in less than 10% of people. In addition, substantial weight loss may require years of dietary control and may be difficult if patients have severe obesity. In these cases, surgical management may be considered.  Finally, older individuals who have tolerated obesity without health complications may be less likely to benefit from weight loss strategies.      Your BMI is Body mass index is 31.39 kg/(m^2).  Weight management is a personal decision.  If you are interested in exploring weight loss strategies, the following discussion covers the approaches that may be successful. Body mass index (BMI) is one way to tell whether you are at a healthy weight, overweight, or obese. It measures your weight in relation to your height.  A BMI of 18.5 to 24.9 is in the healthy range. A person with a BMI of 25 to 29.9 is considered overweight, and someone with a BMI of 30 or greater is considered obese. More than two-thirds of American adults are considered overweight or obese.  Being overweight or obese increases the risk for further weight gain. Excess weight may lead to heart disease and diabetes.  Creating and following plans for healthy eating and physical activity may help you improve your health.  Weight control is part of healthy lifestyle and includes exercise, emotional health, and healthy eating habits. Careful eating habits lifelong are the mainstay of weight control. Though  there are significant health benefits from weight loss, long-term weight loss with diet alone may be very difficult to achieve- studies show long-term success with dietary management in less than 10% of people. Attaining a healthy weight may be especially difficult to achieve in those with severe obesity. In some cases, medications, devices and surgical management might be considered.  What can you do?  If you are overweight or obese and are interested in methods for weight loss, you should discuss this with your provider.     Consider reducing daily calorie intake by 500 calories.     Keep a food journal.     Avoiding skipping meals, consider cutting portions instead.    Diet combined with exercise helps maintain muscle while optimizing fat loss. Strength training is particularly important for building and maintaining muscle mass. Exercise helps reduce stress, increase energy, and improves fitness. Increasing exercise without diet control, however, may not burn enough calories to loose weight.       Start walking three days a week 10-20 minutes at a time    Work towards walking thirty minutes five days a week     Eventually, increase the speed of your walking for 1-2 minutes at time    In addition, we recommend that you review healthy lifestyles and methods for weight loss available through the National Institutes of Health patient information sites:  http://win.niddk.nih.gov/publications/index.htm    And look into health and wellness programs that may be available through your health insurance provider, employer, local community center, or aamir club.    Weight management plan: Patient was referred to their PCP to discuss a diet and exercise plan.    Surgery:    Surgery for obstructive sleep apnea is considered generally only when other therapies fail to work. Surgery may be discussed with you if you are having a difficult time tolerating CPAP and or when there is an abnormal structure that requires surgical  correction.  Nose and throat surgeries often enlarge the airway to prevent collapse.  Most of these surgeries create pain for 1-2 weeks and up to half of the most common surgeries are not effective throughout life.  You should carefully discuss the benefits and drawbacks to surgery with your sleep provider and surgeon to determine if it is the best solution for you.   More information  Surgery for ABHINAV is directed at areas that are responsible for narrowing or complete obstruction of the airway during sleep.  There are a wide range of procedures available to enlarge and/or stabilize the airway to prevent blockage of breathing in the three major areas where it can occur: the palate, tongue, and nasal regions.  Successful surgical treatment depends on the accurate identification of the factors responsible for obstructive sleep apnea in each person.  A personalized approach is required because there is no single treatment that works well for everyone.  Because of anatomic variation, consultation with an examination by a sleep surgeon is a critical first step in determining what surgical options are best for each patient.  In some cases, examination during sedation may be recommended in order to guide the selection of procedures.  Patients will be counseled about risks and benefits as well as the typical recovery course after surgery. Surgery is typically not a cure for a person s ABHINAV.  However, surgery will often significantly improve one s ABHINAV severity (termed  success rate ).  Even in the absence of a cure, surgery will decrease the cardiovascular risk associated with OSA7; improve overall quality of life8 (sleepiness, functionality, sleep quality, etc).  Palate Procedures:  Patients with ABHINAV often have narrowing of their airway in the region of their tonsils and uvula.  The goals of palate procedures are to widen the airway in this region as well as to help the tissues resist collapse.  Modern palate procedure  techniques focus on tissue conservation and soft tissue rearrangement, rather than tissue removal.  Often the uvula is preserved in this procedure. Residual sleep apnea is common in patient after pharyngoplasty with an average reduction in sleep apnea events of 33%2.    Tongue Procedures:  ExamWhile patients are awake, the muscles that surround the throat are active and keep this region open for breathing. These muscles relax during sleep, allowing the tongue and other structures to collapse and block breathing.  There are several different tongue procedures available.  Selection of a tongue base procedure depends on characteristics seen on physical exam.  Generally, procedures are aimed at removing bulky tissues in this area or preventing the back of the tongue from falling back during sleep.  Success rates for tongue surgery range from 50-62%3.  Hypoglossal Nerve Stimulation:  Hypoglossal nerve stimulation has recently received approval from the [place] Food and Drug Administration for the treatment of obstructive sleep apnea.  This is based on research showing that the system was safe and effective in treating sleep apnea6.  Results showed that the median AHI score decreased 68%, from 29.3 to 9.0. This therapy uses an implant system that senses breathing patterns and delivers mild stimulation to airway muscles, which keeps the airway open during sleep.  The system consists of three fully implanted components: a small generator (similar in size to a pacemaker), a breathing sensor, and a stimulation lead.  Using a small handheld remote, a patient turns the therapy on before bed and off upon awakening.    Candidates for this device must be greater than 22 years of age, have moderate to severe ABHINAV (AHI between 20-65), BMI less than 32, have tried CPAP/oral appliance without success, and have appropriate upper airway anatomy (determined by a sleep endoscopy performed by Dr. Amato).    Hypoglossal Nerve  Stimulation Pathway:    The sleep surgeon s office will work with the patient through the insurance prior-authorization process (including communications and appeals).    Nasal Procedures:  Nasal obstruction can interfere with nasal breathing during the day and night.  Studies have shown that relief of nasal obstruction can improve the ability of some patients to tolerate positive airway pressure therapy for obstructive sleep apnea1.  Treatment options include medications such as nasal saline, topical corticosteroid and antihistamine sprays, and oral medications such as antihistamines or decongestants. Non-surgical treatments can include external nasal dilators for selected patients. If these are not successful by themselves, surgery can improve the nasal airway either alone or in combination with these other options.  Combination Procedures:  Combination of surgical procedures and other treatments may be recommended, particularly if patients have more than one area of narrowing or persistent positional disease.  The success rate of combination surgery ranges from 66-80%2,3.    References  1. Flakito HERRMANN. The Role of the Nose in Snoring and Obstructive Sleep Apnoea: An Update.  Eur Arch Otorhinolaryngol. 2011; 268: 1365-73.  2.  Kenyatta SM; Jenn JA; Carrillo JR; Pallanch JF; Asya MB; Gavin SG; Gene RUBIN. Surgical modifications of the upper airway for obstructive sleep apnea in adults: a systematic review and meta-analysis. SLEEP 2010;33(10):1737-0525. Yvon SUTTON. Hypopharyngeal surgery in obstructive sleep apnea: an evidence-based medicine review.  Arch Otolaryngol Head Neck Surg. 2006 Feb;132(2):206-13.  3. Kin YH1, Nitin Y, German JOCELINE. The efficacy of anatomically based multilevel surgery for obstructive sleep apnea. Otolaryngol Head Neck Surg. 2003 Oct;129(4):327-35.  4. Yvon SUTTON, Goldberg A. Hypopharyngeal Surgery in Obstructive Sleep Apnea: An Evidence-Based Medicine Review. Arch Otolaryngol Head Neck Surg.  2006 Feb;132(2):206-13.  5. Calos PALAFOX et al. Upper-Airway Stimulation for Obstructive Sleep Apnea.  N Engl J Med. 2014 Jan 9;370(2):139-49.  6. Venkat Y et al. Increased Incidence of Cardiovascular Disease in Middle-aged Men with Obstructive Sleep Apnea. Am J Respir Crit Care Med; 2002 166: 159-165  7. Kourtney EM et al. Studying Life Effects and Effectiveness of Palatopharyngoplasty (SLEEP) study: Subjective Outcomes of Isolated Uvulopalatopharyngoplasty. Otolaryngol Head Neck Surg. 2011; 144: 623-631.  General recommendations for sleep problems (Insomnia)  Allow 2-4 weeks to see results     Establish a regular sleep schedule    Most people only need 7-8 hours of sleep.  Don't be in bed longer than you need     to sleep or you will end up spending more time awake in bed. This trains your    brain to think of the bed as a place to not sleep.  Go to bed at same time each night   Get up at same time each day - Set an alarm everyday (even weekends). This is one of    the most important tips. It prevents you from relying on your insomnia to get you    up on time for your day. That actually reinforces insomnia. It also will help your    body get into a pattern where you start feeling tired at a consistent time each    night.  The body functions best when you keep a consistent routine.  Avoid sleeping-in and napping. Anytime you sleep during the day, you will be less tired at    night. You may be tired enough to fall asleep, but you will wake more in the    middle of the night because you will have met your sleep need before the night is    done.   Cut down time in bed (if not asleep, get up)- Use your bed only for sleep and sex    Anytime you spend time in bed doing activities other than sleep (reading,    watching TV, working, playing on the computer or phone, or even just laying in    bed trying to sleep), you are training  your brain to think of the bed as a place to    do activities other than sleep. If you are not  falling asleep within 20-30 minutes,    get out of bed. While out of bed, avoid bright lights. Avoid work or chores. Being    productive in the middle of the night reinforces waking up at night. Find relaxing,    not particularly entertaining activities like reading, listening to music, or relaxation    exercises. Go back to bed if you start feeling groggy, or after about 30 minutes,    even if not feeling very tired. Sometimes, just getting out of bed stops the pattern    of getting frustrated about laying in bed not sleeping, and that can help you fall    asleep.   Avoid trying to force yourself to sleep- sleep is not like everything else. The harder you    work at most things, the more you can accomplish. The harder you work at    sleep, the less you will sleep.     Make the bedroom comfortable - quiet, dark and cool are better. Consider ear plugs    (silicon). Use dark blinds or wear an eye mask if needed     Make a relaxing routine prior to bedtime  Relaxation exercises:   Progressive muscle relaxation: Relax each muscle group individually    Begin with your feet, flex, then relax. Try to imagine your feet feeling heavy and sinking into the bed. Move to your calves, do the same thing. Work through each muscle group toward your head.    Relaxing Mental Imagery: Try to imagine a trip that you took and found relaxing, or imagine a day at the beach. Try to walk yourself through the day in your mind as if you were dreaming it. Try to imagine sensing the different experiences, such as feeling sand between your toes, the heat of the sun on your skin, seeing the waves crashing the shore, the smell of the salt water, etc.     Deal with your worries before bedtime    Set aside a worry time around dinner time for 10-15 minutes. Write down the    things that are on your mind. Plan time in the coming days to address those    issues. Brainstorm ideas on how you will deal with them. Try to identify issues    that are out of  your control, and try to let those issues go.  Listen to relaxation tapes   Classical Music or Nature sounds   Back Massage   Get regular exercise each day (at least 1-2 hours before bedtime)   Take medications only as directed   Eat a light bedtime snack or warm drink   Warm milk   Warm herbal tea (non-caffeinated)       Things to avoid   No overstimulating activities just before bed   No competitive games before bedtime   No exciting television programs before bedtime   Avoid caffeine after lunchtime   Avoid chocolate   Do not use alcohol to induce sleep (worsens Insomnia)   Do not take someone else's sleeping pills   Do not look at the clock when awakening   Do not turn on light when getting up to use bathroom, use a nightlight     Online Programs     www.[employer] (pronounced shut eye). There is a fee for this program. Enter the code  [place]  if you decide to enroll in this program.      www.OrphazymeIO.com (pronounced sleep ee oh). There is a fee for this program. Enter the code  [place]  if you decide to enroll in this program.     Suggested Resources  Insomnia Treatment Books     Overcoming Insomnia by Clinton Wilson and Seema Kiser (2008)    No More Sleepless Nights by Soy Lema and Chacha Harden (1996)    Say Shaneka to Insomnia by Corey Abernathy (2009)    The Insomnia Workbook by Seema Odom and Maverick Whitten (2009)    The Insomnia Answer by Franklyn Vargas and Austin Montgomery (2006)      Stress Management and Relaxation Books    The Relaxation and Stress Reduction Workbook by Martina Taylor, Dara Galindo and Anthony Mijares (2008)    Stress Management Workbook: Techniques and Self-Assessment Procedures by Lauren Cassidy and Daniel Adamson (1997)    A Mindfulness-Based Stress Reduction Workbook by Fernando Agosto and Eleni Alberto (2010)    The Complete Stress Management Workbook by Keven Lopes, Benjamin Dumas and Luis E Tirado (1996)    Assert Yourself by Lety Soria  and Higinio Soria (1977)    Relaxation Resources for Computer Download   These websites offer resources to help you relax. This list is for information only. [facility] is not responsible for the quality of services or the actions of any person or organization.  Progressive Muscle Relaxation (PMR):     http://www.Scrap Connection/progressive-muscle-relaxation-exercise.html     http://studentsupport.[facility]/counseling/resources/self-help/relaxation-and-stress-management/   Deep Breathing Exercises:    http://www.Sc[employer] Connection/breathing-awareness.html     Meditation:     wwwCityzenith    www.Lola PirindolaguidedJoyme.commeditation-site.com You may have to pay for some of these resources.    Guided Imagery:    http://www.[employer] Connection/guided-imagery-scripts.html     http://Zyante/library/euhvmojqjd-eqezgt-aycruuv/     Counseling / Behavioral Health  [facility]  Visit www.[email or web address].org or call [phone] to find a clinic close to you.  Or call [phone] for [facility].

## 2017-09-28 NOTE — TELEPHONE ENCOUNTER
Records received from [facility].   Included  Office notes: 1/30/17  Op/Procedure notes: upper gi endoscopy 3/9/17  Colonoscopy 3/9/17  Pathology reports: esophageus 3/9/17, fine needle aspiration neck,lymph node 4/4/17  Other: labs     Missing/needed records: CT 9/29/16

## 2017-09-28 NOTE — PROGRESS NOTES
Sleep Consultation:    Date on this visit: 9/28/2017    Ada Welch is sent by Wade Singh for a sleep consultation regarding ABHINAV.    Primary Physician: Ellyn Rivera     Ada Welch reports nocturia, chronic fatigue and migraines for 8+ years. Her medical history is significant for gastroparesis, Ehler's Danlos Syndrome, Mast cell disease, Chiari malformation (thinks type 1), dysautonomia and migraines.     Ada goes to bed at 9:00-9:30 PM during the week. She watches TV for 30-60 minutes. She wakes up at 6:30-7:00 AM often before her alarm. She falls asleep in 10-15 minutes.  She uses amitriptyline 30 mg at 8:30 PM for sleep and migraines. She also sometimes takes melatonin. Ada denies difficulty falling asleep.  Her sleep goes through cycles of binge sleeping (16 hours per day) and only sleeping a few hours per night (sleeps for an hour or two with awakenings in the middle). She will watch TV if not sleeping. She does not want to get up because she doesn't want to bother people. She used to get up and clean the house. They are now in a smaller apartment. When binge sleeping, she will still wake 2-3 times for the restroom, but she falls right back to sleep. She does have a reasonably normal sleep for periods as well. She can get 7 hours on those nights. She can't estimate how frequently her sleep cycles. On weekends, her sleep schedule is not much different. It may be shifted a little later.  She says she takes sleep where she can get it because she feels exhausted all of the time. Her sleep is very much disrupted by pain. Her pain will flare when she is getting sick and then she struggles to sleep.    Patient does watch TV in bed and does not worry or read in bed. She does not worry about being able to sleep, but it does bother her. She rejected hardware from a spinal fusion and has been battling pain for over 3.5 years. She saw a pain psychologist to help her make peace.      Ada is not currently working. She has worked as a ,   and payroll . Currently, she spends a lot of time in [facility] support groups. She lives with her , 6 and 13 year old kids.       Ada does snore at times. She thinks it may depend on her congestion. She does not know if her sleeping position influences it. She thinks she snores less since changing her pillows. Patient does have a regular bed partner. There is not report of gasping and snorting.  She has been observed to have pauses in her breathing. Her  last mentioned that a couple of years ago. She does not know if she is still doing that. It was better when she weighed less. She gained 60 pounds last summer when she was on gabapentin and Lyrica. They never sleep separately due to her snoring.  Patient sleeps on her back and side (left mostly). She has occasional morning dry mouth, denies snort arousals, morning headaches (the morning is the only time she doesn't have headaches), morning confusion and restless legs. Ada denies any bruxism, sleep walking, sleep talking, dream enactment, sleep paralysis, cataplexy and hypnogogic/hypnopompic hallucinations.    She has a neurologist in [facility], Dr. Mukul Sofia. She will be seeing him to discuss management of Chiari malformation.    Ada has difficulty breathing through her nose, reflux at night and heartburn (has hiatal hernia, well controlled with omeprazole in the morning and Pepcid at night), denies claustrophobia and depression.  She has chronic sinus issues. She has had a septoplasty. She uses Flonase and Zyrtec.    Patient describes themself as a night person.  She would prefer to go to sleep at 10:00-10:30 PM and wake up at 6:00-6:30 AM.  Patient's [place] Sleepiness score 13/24 consistent with mild daytime sleepiness.      Ada naps 1-2 times per week for  minutes, feels refreshed after naps. Napping is one of the only  ways to get rid of her migraines. She has been having migraines daily since July 4th. She takes no inadvertant naps. She lays down a lot in the daytime because she gets migraines and other symptoms of hypotension if she is upright for too long.  She denies falling asleep and she will try to get up periodically so that she isn't laying down all day. She denies dozing while driving. Patient was counseled on the importance of driving while alert, to pull over if drowsy, or nap before getting into the vehicle if sleepy.  She uses 4 sodas/day. Last caffeine intake is usually with dinner.    Allergies:    Allergies   Allergen Reactions     Bee Venom Anaphylaxis, Difficulty breathing, Palpitations, Shortness Of Breath and Visual Disturbance     Patient does carry Epi-Pen     Chicken-Derived Products (Egg) Diarrhea     Other reaction(s): Nausea  Patient will self monitor  Other reaction(s): GI intolerance     Gabapentin      Gluten Meal      Lactose Dermatitis and Diarrhea     Milk Protein Extract      Other reaction(s): GI intolerance\  EGGS      Topiramate      Wheat Diarrhea     Wheat Bran      Other reaction(s): Nausea  Patient will self monitor       Medications:    Current Outpatient Prescriptions   Medication Sig Dispense Refill     levothyroxine (SYNTHROID) 25 MCG tablet Take 25 mcg by mouth daily       meloxicam (MOBIC) 7.5 MG tablet Take 1 tablet (7.5 mg) by mouth daily 15 tablet 0     amitriptyline (ELAVIL) 10 MG tablet Take 3 tablets (30 mg) by mouth At Bedtime 90 tablet 2     famotidine (PEPCID) 20 MG tablet Take 1 tablet (20 mg) by mouth At Bedtime 90 tablet 1     omeprazole (PRILOSEC) 20 MG CR capsule Take 1 capsule (20 mg) by mouth daily 90 capsule 1     ondansetron (ZOFRAN-ODT) 4 MG ODT tab Take 1-2 tablets (4-8 mg) by mouth every 12 hours as needed for nausea 60 tablet 1     metoclopramide (REGLAN) 5 MG tablet Take 1 tablet (5 mg) by mouth every 6 hours as needed 20 tablet 1     traMADol (ULTRAM) 50 MG  tablet Take 1 tablet (50 mg) by mouth every 6 hours as needed for pain maximum 4 tablet(s) per day (Patient not taking: Reported on 9/26/2017) 15 tablet 0     rizatriptan (MAXALT-MLT) 5 MG ODT tab TAKE 1-2 TABLETS (5-10 MG) BY MOUTH AT ONSET OF HEADACHE FOR MIGRAINE (MAX 30 MG IN 24 HOURS) 20 tablet 6     tiZANidine (ZANAFLEX) 4 MG tablet Take 2 tablets (8 mg) by mouth 3 times daily as needed for muscle spasms 90 tablet 3     buprenorphine (BUTRANS) 10 MCG/HR WK patch Place 1 patch onto the skin every 7 days 4 patch 0     EPINEPHrine (EPIPEN/ADRENACLICK/OR ANY BX GENERIC EQUIV) 0.3 MG/0.3ML injection 2-pack Inject 0.3 mg into the muscle       albuterol (PROAIR HFA/PROVENTIL HFA/VENTOLIN HFA) 108 (90 BASE) MCG/ACT Inhaler Inhale 2 puffs into the lungs       fluticasone (FLONASE) 50 MCG/ACT spray 2 sprays       etodolac (LODINE) 400 MG tablet Take 400 mg by mouth       COMPRESSION STOCKINGS 1 each daily 20-30 mmHg Thigh Length measure and fit, color and style per patient preference. Doff n jose per need. 3 each 3     SUMAtriptan (IMITREX STATDOSE) 6 MG/0.5ML pen injector kit Inject 0.5 mLs (6 mg) Subcutaneous at onset of headache for migraine (may repeat once after 2 hrs) May repeat in 1 hour. Max 12 mg/24 hours. 2 kit 1     ketamine, KETALAR, 5%-gabapentin, NEURONTIN, 8%-lidocaine 2.5% 5%-8% GEL topical PLO cream Apply 1 g topically 3 times daily as needed 30 g 3     fluticasone-salmeterol (ADVAIR DISKUS) 250-50 MCG/DOSE diskus inhaler Inhale 1 puff into the lungs 2 times daily as needed        cetirizine (ZYRTEC) 10 MG tablet Take 10 mg by mouth daily        polyethylene glycol (MIRALAX/GLYCOLAX) powder        Multiple Vitamins-Minerals (MULTIVITAMIN PO) Take by mouth daily       Probiotic Product (PROBIOTIC DAILY PO) Take by mouth 2 times daily         Problem List:  Patient Active Problem List    Diagnosis Date Noted     Slow transit constipation 08/30/2017     Priority: Medium     Urinary urgency 08/30/2017      Priority: Medium     Nocturia 08/30/2017     Priority: Medium     Cardiac device in situ- Medtronic Implanted Loop Recorder (ILR) 08/30/2017     Priority: Medium     Cervicalgia 08/25/2017     Priority: Medium     Headache 07/25/2017     Priority: Medium     Postlaminectomy syndrome, lumbar region 06/14/2017     Priority: Medium     Pain syndrome, chronic 09/05/2016     Priority: Medium     s/p TLIF L4-5,L5-S1 with solid arthrodesis 09/05/2016     Priority: Medium     Tom-Danlos syndrome 09/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     Priority: Medium        Past Medical/Surgical History:  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     Chronic sinusitis 00/2007    Diagnosed with chronic pansinusitis 2016     Chronic tonsillitis 1980    Had tonsils removed 02/1981, rheumatic fever     Gastroesophageal reflux disease 3/1/2017    I have large hiatal hernia     Hernia, abdominal 10/2016    Hiatal Hernia     Hiatal hernia 2016    Have adeline hiatel hernia     Hoarseness Unsure    It comes and goes     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     Migraines 1/1/2007    Unsure of exact date     Nasal polyps 2013    Were revoved 03/2017, benign     Pneumonia Unsure    Have had pneumonia several times     Swelling, mass, or lump in head and neck 08/2016    Lymph node has been growing for last year     Thyroid disease 01/01/2008     Past Surgical History:   Procedure Laterality Date     ADENOIDECTOMY  1981     AS REPAIR OF NASAL SEPTUM       NASAL/SINUS POLYPECTOMY  2017    Polyps were benign     TONSILLECTOMY       TONSILLECTOMY  1981     TUBAL LIGATION         Social History:  Social History     Social History     Marital status:      Spouse name: Carry      Number of children: 5     Years of education: N/A     Occupational History     Not on file.     Social History Main Topics     Smoking status: Former Smoker     Packs/day: 0.20     Years: 10.00     Types: Cigarettes     Start date: 1/1/1991     Quit date: 12/1/2013     Smokeless tobacco: Never Used      Comment: I smoked on and off during specified dates.     Alcohol use Yes      Comment: couple per year     Drug use: No     Sexual activity: Yes     Partners: Male     Birth control/ protection: Female Surgical     Other Topics Concern     Not on file     Social History Narrative    5 kids: 24, 23, 19, 13, 5 yo     works at [employer]           Family History:  Family History   Problem Relation Age of Onset     Connective Tissue Disorder Mother      eds     Connective Tissue Disorder Sister      eds     CANCER Father      Spinal tumor grew into spinal cord, went in brain     Migraines Father      DIABETES Maternal Grandmother      Elderly diabetes     HEART DISEASE Maternal Grandmother      Hypertension Maternal Grandmother      DIABETES Paternal Grandmother      Elderly diabetes     DIABETES Paternal Grandfather      Elderly diabetes     Asthma Sister      Pediatric Asthma     Migraines Sister      Asthma Son      Pediatric Asthma     Thyroid Disease Sister      ,     Migraines Sister        Review of Systems:  A complete review of systems reviewed by me is negative with the exeption of what has been mentioned in the history of present illness.  CONSTITUTIONAL: NEGATIVE for weight gain/loss, fever, chills, drug allergies.  CONSTITUTIONAL:  POSITIVE for  sweats and night sweats  EYES: NEGATIVE for blind spots.  EYES:  POSITIVE for changes in vision, double vision and floaters  ENT: NEGATIVE for ear pain, sore throat, sinus pain, runny nose, bloody nose  ENT:  POSITIVE for  post-nasal drip and sinus congestion  CARDIAC: NEGATIVE for fluttering in chest, chest pain or pressure,  "breathlessness when lying flat.  CARDIAC:  POSITIVE for  fast heart beats, swollen legs and swollen feet  NEUROLOGIC: POSITIVE headaches, weakness or numbness in the arms or legs.  DERMATOLOGIC: NEGATIVE for rashes, new moles or change in mole(s)  PULMONARY: NEGATIVE SOB at rest, dry cough, coughing up blood, whistling when breathing.    PULMONARY:  POSITIVE for  SOB with activity, productive cough and wheezing   GASTROINTESTINAL: NEGATIVE for vomitting, loose or watery stools, fat or grease in stools, abdominal pain, bowel movements black in color or blood noted.  GASTROINTESTINAL:  POSITIVE for  nausea and constipation  GENITOURINARY: NEGATIVE for pain during urination, blood in urine, irregular menstrual periods.  GENITOURINARY:  POSITIVE for  urinating more frequently than usual  MUSCULOSKELETAL: POSITIVE for muscle pain, bone or joint pain, swollen joints.  ENDOCRINE: NEGATIVE for increased thirst, diabetes.  ENDOCRINE:  POSITIVE for  increased urination  LYMPHATIC: NEGATIVE for lumps or bumps in the breasts or nipple discharge.  LYMPHATIC:  POSITIVE for  swollen lymph nodes    Physical Examination:  Vitals: BP 95/60  Pulse 100  Resp 18  Ht 1.702 m (5' 7\")  Wt 90.9 kg (200 lb 6.4 oz)  SpO2 98%  BMI 31.39 kg/m2  Neck Circumference: not taken, she has a neck brace on.     [place] Total Score 9/28/2017   Total score - [place] 13       GENERAL APPEARANCE: healthy, alert, no distress and cooperative  EYES: Eyes grossly normal to inspection, PERRL, conjunctivae and sclerae normal and lids and lashes normal  HENT: nose and mouth without ulcers or lesions, oropharynx small and tongue base sits slightly above occlusal plane  NECK: wearing brace  RESP: lungs clear to auscultation - no rales, rhonchi or wheezes  CV: regular rates and rhythm, normal S1 S2, no S3 or S4, no murmur, click or rub and no irregular beats  MS: extremities normal- no gross deformities noted  NEURO: Normal strength and tone, mentation intact, " speech normal and cranial nerves 2-12 intact  Mallampati Class: III.  Tonsillar Stage: 0  surgically removed.    Impression/Plan:    (G47.30) Observed sleep apnea  (primary encounter diagnosis), (R06.83) Snoring, (R35.1) Nocturia, (Q07.00) Arnold-Chiari malformation (H)  Comment: Ada presents with concerns of sleep apnea. She is told she snores, but not all of the time. She does not know what determines whether or not she will snore. She has been told that she seemed to have pauses in breathing in her sleep, but not for a few years. She was about 55 pounds lighter at the time when that was mentioned, but she has also had sinus surgery since then. She has some risk for central apnea due to Chiari Malformation (unsure of type, but suspects type I). She has nocturia (2-3 times per night). She denies napping or inadvertent dozing, but her ESS is 13/24. She has Ehler's Danlos Syndrome which could certainly contribute to chronic fatigue. She does not have HTN, but has dysautonomia. Her BMI is 31. Her neck circumference was not assessed because she was wearing a neck brace. Her age (46) and gender are not positive risk factors for ABHINAV.  Plan: Comprehensive Sleep Study        Split night PSG with CPAP/BiPAP titration if indicated. She was advised to take her amitriptyline as usual for her PSG.      (F51.04) Chronic insomnia  Comment: She has cycles of over-sleeping and under-sleeping. It is influenced by pain and illness. It is also influenced by being less physically active in the daytime as a result of her orthostasic symptoms.  Plan: We discussed circadian and homeostatic sleep drives. She was advised to try to keep a consistent sleep schedule, aiming for no more than 8 hours in bed per night. Avoid naps and sleeping in. Try to get bright light and as much physical activity as she can tolerate in the daytime.       Literature provided regarding sleep apnea and insomnia.      She will follow up with me in  approximately two weeks after her sleep study has been competed to review the results and discuss plan of care.       Polysomnography reviewed.  Obstructive sleep apnea reviewed.  Complications of untreated sleep apnea were reviewed.  45 minutes was spent during this visit, over 50% in counseling and coordination of care.   Bennett Goltz, PA-C    CC: Wade Singh

## 2017-09-29 NOTE — TELEPHONE ENCOUNTER
Additional records received from [facility].   Included  Office notes: 2/21/17, 3/9/17, 3/21/17, 3/29/17, 3/31/17, 5/8/17  ED notes: 2/20/17  Radiology reports: US abdomen 2/20/17  Other: labs

## 2017-10-02 ENCOUNTER — HOSPITAL ENCOUNTER (OUTPATIENT)
Dept: NUCLEAR MEDICINE | Facility: CLINIC | Age: 47
Setting detail: NUCLEAR MEDICINE
Discharge: HOME | End: 2017-10-02
Attending: INTERNAL MEDICINE | Admitting: INTERNAL MEDICINE
Payer: COMMERCIAL

## 2017-10-02 ENCOUNTER — OFFICE VISIT (OUTPATIENT)
Dept: NEUROLOGY | Facility: CLINIC | Age: 47
End: 2017-10-02

## 2017-10-02 VITALS
HEIGHT: 67 IN | RESPIRATION RATE: 20 BRPM | HEART RATE: 106 BPM | DIASTOLIC BLOOD PRESSURE: 79 MMHG | TEMPERATURE: 98.3 F | BODY MASS INDEX: 31.23 KG/M2 | SYSTOLIC BLOOD PRESSURE: 130 MMHG | OXYGEN SATURATION: 99 % | WEIGHT: 199 LBS

## 2017-10-02 DIAGNOSIS — R13.10 DYSPHAGIA: ICD-10-CM

## 2017-10-02 DIAGNOSIS — K58.9 IRRITABLE BOWEL SYNDROME: ICD-10-CM

## 2017-10-02 DIAGNOSIS — G43.011 INTRACTABLE MIGRAINE WITHOUT AURA AND WITH STATUS MIGRAINOSUS: ICD-10-CM

## 2017-10-02 DIAGNOSIS — R51.9 CHRONIC DAILY HEADACHE: Primary | ICD-10-CM

## 2017-10-02 PROCEDURE — 78264 GASTRIC EMPTYING IMG STUDY: CPT

## 2017-10-02 PROCEDURE — A9541 TC99M SULFUR COLLOID: HCPCS | Performed by: INTERNAL MEDICINE

## 2017-10-02 PROCEDURE — 34300033 ZZH RX 343: Performed by: INTERNAL MEDICINE

## 2017-10-02 RX ORDER — ELETRIPTAN HYDROBROMIDE 20 MG/1
20 TABLET, FILM COATED ORAL
Qty: 18 TABLET | Refills: 1 | Status: SHIPPED | OUTPATIENT
Start: 2017-10-02 | End: 2019-10-23

## 2017-10-02 RX ORDER — ZONISAMIDE 25 MG/1
CAPSULE ORAL
Qty: 60 CAPSULE | Refills: 3 | Status: SHIPPED | OUTPATIENT
Start: 2017-10-02 | End: 2017-10-05

## 2017-10-02 RX ADMIN — Medication 2 MCI.: at 08:15

## 2017-10-02 ASSESSMENT — PAIN SCALES - GENERAL: PAINLEVEL: EXTREME PAIN (8)

## 2017-10-02 NOTE — NURSING NOTE
Present with provider for the majority of the clinic visit to hear Ada's complex medical story and to establish a connection with Ada.  Provided her with a contact card for our center an invited her to call me with questions or concerns and told her I could easily triage with Dr. Leach.  I did also talk with her about the availability of our  should Ada have unmet psychosocial needs or insurance questions.    Reviewed and discussed the patient instructions point by point and patient verbalized understanding. Copy of the After Visit Summary (AVS) given to patient for future reference. Patient given the contact card for the [facility] and has the appropriate numbers to call with any questions.    Ilda Jensen RN - Nurse Clinician - [facility] - [phone]

## 2017-10-02 NOTE — NURSING NOTE
Chief Complaint   Patient presents with     RECHECK     [facility]- HEADACHES, 1 MONTH F/U     Jose Eason, CMA

## 2017-10-02 NOTE — PROGRESS NOTES
"Re: Ada Welch      MRN# [id number]  YOB: 1970  Date of Visit:11/3/2017     OUTPATIENT NEUROLOGY VISIT NOTE    Reason for Visit:  Headache follow up    Interval History:  Ada Welch is a 46-year-old female presents to the clinic today for headache follow up. History of s/p  removal of posterior instrumentation (screws and guadalupe) with exploration of fusion, chronic low back pain,  Ehler Danlos syndrome, chronic headache, disequilibrium, mast cell disorder, Chiari malformation. Has seen Dr Martinez at the [facility], please refer to his notes for details. Patient is currently has a Butran patch for his lower legs/back but reports that the patch does not help with pain flares and does not do anything for controlling her headaches.   Today patient reports that she was seen at [place] neurology/neurosurgery specialist with [facility]. Patient reports that Jesus Alberto Sofia MD with [facility]. Patient is going back to see Dr Puri in November 2018. No notes from Dr Sofia available for review. Patient is planning to fly to [place] for TMJ specialist who works with [facility].   Patient has been seeing Dr Martinez from Pain Clinic.   Patient reports that she gets dizzy, facial numbness with vertical position. Patient reports that she takes rizatriptan 9 days per months (can get only 18 tablets) and sumatriptan injectable 4 doses. Patient reports that she run out rizatriptan a week ago.   Reports that Botox is not an option. Patient is seeing Dr Martinez on 10/27 to discuss her recent visit with Dr Holland a neurologist/neurosurgery at [facility], no outside notes from Dr Jasso available for our review today.   Treatments Tried for headache:  Amitriptyline 10 mg at bedtime started just one week ago by Dr Martinez  Tylenol and Benadryl daily for 2 month  Physical therapy which made her headaches worse  Sumatriptan injectable helps but causing \"muscle tightness\"\"sickness\" and " "\"dizziness\"  Rizatriptan -would help for a short time  Tizanidine prescribed by Dr Juan Morales Dose pack-may be some relief  DHE injection which helped with cluster headaches in the past. Tried at her last hospitalization but would reduce her \"BP\"  metoclopramide helped with nausea  Ketorolac -helped but for a short time  Promethazine  Gabapentin -\"does nothing\" for me  Lyrica -\"no benefit\" from that   Topiramate-\"allergic\" to it, patient does not remember what happen.   Zonegran-remember the name but \"does not remember\" any severe reaction.     Neurodiagnostic Testing  MRI  MRI brain without and with contrast  MRA of the head without contrast  Neck MRA without and with contrast     Provided History:  look for carotid dissection- hx of Tom Danlos-  hx of headache with visual problems and left facial numbness.     Comparison:  CT abdomen 16 2013       Technique:   Brain MRI:  Axial diffusion, FLAIR, T2-weighted, susceptibility, and  coronal T1-weighted images were obtained without intravenous contrast.  Following intravenous gadolinium-based contrast administration, axial  and coronal T1-weighted images were obtained.    Head MRA: 3D time-of-flight MRA of the [place] was performed  without intravenous contrast.  Neck MRA:  Limited non contrast 2DTOF images were obtained of the  mid-cervical region. Following intravenous gadolinium-based contrast  administration, a contrast enhanced MRA of the neck/cervical vessels  was performed.  Three-dimensional reconstructions of the neck and head MRA were  created, which were reviewed by the radiologist.  Head MRV: 2D time-of-flight MR venogram (MRV) of the head was  performed without intravenous contrast. Following intravenous  gadolinium-based contrast administration, a contrast enhanced MRV of  the intracranial vessels was performed.     Dose: 7.5 mL Gadavist     Findings:   Brain MRI: Axial diffusion weighted images demonstrate no definite  acute infarct. On " the FLAIR images, there is no significant  abnormality. There is no definite intracranial hemorrhage on  susceptibility images. Ventricles are proportionate to the cerebral  sulci. Contrast-enhanced images of the brain demonstrate no abnormal  intra- or extra-axial enhancement. Ethmoid sinus mucosal thickening.  Maxillary sinus mucosal thickening. Fluid level in the left greater  than right maxillary sinus.     Head MRA demonstrates no definite aneurysm or stenosis of the major  intracranial arteries. The anterior communicating artery is patent.  Regarding the posterior communicating arteries, both are patent.     Neck MRA demonstrates patent major cervical arteries. The normal  distal right internal carotid artery measures 6 mm. The normal distal  left internal carotid artery measures 6 mm. Antegrade flow in the  major cervical vasculature. On the T2 and T1 fat saturated neck  imaging there is no evidence for dissection. Bilateral prominent lymph  nodes, likely reactive     Head MRV demonstrates no definite thrombosis or stenosis of the major  intracranial dural sinuses or deep cerebral veins. Postcontrast MRV  images do not demonstrate any definite intraluminal filling defect.         Impression:  1. No evidence of acute infarction or intracranial hemorrhage.  2. No abnormal enhancing lesions intracranially.  3. Head MRA demonstrates no definite aneurysm or stenosis of the major  intracranial arteries.  4. Neck MRA demonstrates patent major cervical arteries. No evidence  for dissection.  5. Normal MRV of the brain.  6. Air-fluid level in the left greater than right maxillary sinus, may  represent sinusitis in the correct clinical context.     I have personally reviewed the examination and initial interpretation  and I agree with the findings.     KOREY OVERTON MD    Past Medical History reviewed   Social History   Substance Use Topics     Smoking status: Former Smoker     Packs/day: 0.20     Years: 10.00      Types: Cigarettes     Start date: 1/1/1991     Quit date: 12/1/2013     Smokeless tobacco: Never Used      Comment: I smoked on and off during specified dates.     Alcohol use Yes      Comment: couple per year    reviewed and verified with the patient     Allergies   Allergen Reactions     Bee Venom Anaphylaxis, Difficulty breathing, Palpitations, Shortness Of Breath and Visual Disturbance     Patient does carry Epi-Pen     Chicken-Derived Products (Egg) Diarrhea     Other reaction(s): Nausea  Patient will self monitor  Other reaction(s): GI intolerance     Gabapentin      Gluten Meal      Lactose Dermatitis and Diarrhea     Milk Protein Extract      Other reaction(s): GI intolerance\  EGGS      Topiramate      Wheat Diarrhea     Wheat Bran      Other reaction(s): Nausea  Patient will self monitor       Current Outpatient Prescriptions   Medication Sig Dispense Refill     levothyroxine (SYNTHROID) 25 MCG tablet Take 25 mcg by mouth daily       meloxicam (MOBIC) 7.5 MG tablet Take 1 tablet (7.5 mg) by mouth daily 15 tablet 0     amitriptyline (ELAVIL) 10 MG tablet Take 3 tablets (30 mg) by mouth At Bedtime 90 tablet 2     famotidine (PEPCID) 20 MG tablet Take 1 tablet (20 mg) by mouth At Bedtime 90 tablet 1     omeprazole (PRILOSEC) 20 MG CR capsule Take 1 capsule (20 mg) by mouth daily 90 capsule 1     ondansetron (ZOFRAN-ODT) 4 MG ODT tab Take 1-2 tablets (4-8 mg) by mouth every 12 hours as needed for nausea 60 tablet 1     metoclopramide (REGLAN) 5 MG tablet Take 1 tablet (5 mg) by mouth every 6 hours as needed 20 tablet 1     traMADol (ULTRAM) 50 MG tablet Take 1 tablet (50 mg) by mouth every 6 hours as needed for pain maximum 4 tablet(s) per day 15 tablet 0     rizatriptan (MAXALT-MLT) 5 MG ODT tab TAKE 1-2 TABLETS (5-10 MG) BY MOUTH AT ONSET OF HEADACHE FOR MIGRAINE (MAX 30 MG IN 24 HOURS) 20 tablet 6     tiZANidine (ZANAFLEX) 4 MG tablet Take 2 tablets (8 mg) by mouth 3 times daily as needed for muscle spasms  "90 tablet 3     buprenorphine (BUTRANS) 10 MCG/HR WK patch Place 1 patch onto the skin every 7 days 4 patch 0     EPINEPHrine (EPIPEN/ADRENACLICK/OR ANY BX GENERIC EQUIV) 0.3 MG/0.3ML injection 2-pack Inject 0.3 mg into the muscle       albuterol (PROAIR HFA/PROVENTIL HFA/VENTOLIN HFA) 108 (90 BASE) MCG/ACT Inhaler Inhale 2 puffs into the lungs       fluticasone (FLONASE) 50 MCG/ACT spray 2 sprays       etodolac (LODINE) 400 MG tablet Take 400 mg by mouth       COMPRESSION STOCKINGS 1 each daily 20-30 mmHg Thigh Length measure and fit, color and style per patient preference. Doff n jose per need. 3 each 3     SUMAtriptan (IMITREX STATDOSE) 6 MG/0.5ML pen injector kit Inject 0.5 mLs (6 mg) Subcutaneous at onset of headache for migraine (may repeat once after 2 hrs) May repeat in 1 hour. Max 12 mg/24 hours. 2 kit 1     ketamine, KETALAR, 5%-gabapentin, NEURONTIN, 8%-lidocaine 2.5% 5%-8% GEL topical PLO cream Apply 1 g topically 3 times daily as needed 30 g 3     fluticasone-salmeterol (ADVAIR DISKUS) 250-50 MCG/DOSE diskus inhaler Inhale 1 puff into the lungs 2 times daily as needed        cetirizine (ZYRTEC) 10 MG tablet Take 10 mg by mouth daily        polyethylene glycol (MIRALAX/GLYCOLAX) powder        Multiple Vitamins-Minerals (MULTIVITAMIN PO) Take by mouth daily       Probiotic Product (PROBIOTIC DAILY PO) Take by mouth 2 times daily     reviewed and verified with the patient    Review of Systems:   A 10-point ROS including constitutional, eyes, respiratory, cardiovascular, gastroenterology, genitourinary, integumentary, musculoskeletal, neurology and psychiatric were all negative except as mentioned in the interval history.    General Exam:   /79  Pulse 106  Temp 98.3  F (36.8  C)  Resp 20  Ht 1.702 m (5' 7\")  Wt 90.3 kg (199 lb)  SpO2 99%  Breastfeeding? No  BMI 31.17 kg/m2  GEN: Awake, NAD; good eye contact, responses appropriately, patient headache today 8.5/10   Speech is fluent without " dysarthria. EOM intact. There is no nystagmus. Has conjugated gaze. Face is symmetrical. Intact and symmetrical eyebrow and lid raise and eyelid closure, smiles. Hearing Intact to conversation speech. The palates elevates symmetrical.   Normal casual gait.  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  Patient's headache worsen with being in the upright position since July 2017. History of back surgery with complications and long recovery in October of 2014. Headaches were sporadic before but now consist. Cervical and brain MRI on 7/24/2017 and no acute findings reported at that time.     Patient had a comprehensive evaluation in [place] with a neurologist who specializes with [facility]. Patient is seeing Dr Márquez a neurologist on 10/4/2017-will wait for his recommendations.   In meanwhile, will start zonisimide  25 mg daily for 3 days then  25 mg twice daily. Will start slow and increase the dose per patient's tolerance. Drink at least 6-8 glasses of water. No history of nephrolithiasis. Patient had tubal ligation.   May try eletriptan (Relpax) as instructed for acute headache    Prescription eletriptan and zonisimide provided. Correct use and course provided. Expected benefits and typical side effects reviewed. Safety of concomitant medications and interactions reviewed. Patient taught signs and symptoms of adverse reactions and allergies. Patient understands teaching and accepts risks of prescribed medication regimen.    Serotonin syndrome symptoms usually occur within several hours of taking a new drug or increasing the dose of a drug you're already taking.   Signs and symptoms include:   Agitation or restlessness  Rapid heart rate and high blood pressure  Dilated pupils  Loss of muscle coordination or twitching muscles  Muscle rigidity  Heavy sweating  Diarrhea  Headache  Shivering, Goose bumps  Severe serotonin syndrome can be  life-threatening. Signs and symptoms include:High fever, Seizures, Irregular heartbeat, Unconsciousness      I discussed all my recommendation with Ada Welch. The patient verbalizes understanding and comfortable with the plan. The patient has our clinic phone number to call with any questions or concerns. All of the patient's questions were answered from the best of my current knowledge.     Time spent with pt answering questions, discussing findings, counseling and coordinating care was more than 50% the appointment time,  26 minutes.         FIDELINA Carlisle, CNP  [facility]

## 2017-10-02 NOTE — PATIENT INSTRUCTIONS
will try zonisimide  25 mg daily for 3 days then 25 mg twice daily. Will start slow and increase the dose per patient's tolerance. Drink at least 6-8 glasses of water.   May try eletriptan (Relpax) as instructed for acute headache    erotonin syndrome symptoms usually occur within several hours of taking a new drug or increasing the dose of a drug you're already taking.   Signs and symptoms include:   Agitation or restlessness  Rapid heart rate and high blood pressure  Dilated pupils  Loss of muscle coordination or twitching muscles  Muscle rigidity  Heavy sweating  Diarrhea  Headache  Shivering, Goose bumps  Severe serotonin syndrome can be life-threatening. Signs and symptoms include:High fever, Seizures, Irregular heartbeat, Unconsciousness      Zonisamide capsules  What is this medicine?  ZONISAMIDE (lisset NIS a mide) is used to control partial seizures in adults with epilepsy.  How should I use this medicine?  Take this medicine by mouth with a glass of water. Follow the directions on the prescription label. Swallow whole. Do not break open the capsule. This medicine may be taken with or without food. Take your doses at regular intervals. Do not take your medicine more often than directed. Do not stop taking this medicine unless instructed by your doctor or health care professional.  A special MedGuide will be given to you by the pharmacist with each prescription and refill. Be sure to read this information carefully each time.  Talk to your pediatrician regarding the use of this medicine in children. While this drug may be prescribed for children as young as 16 years of age for selected conditions, precautions do apply.  What side effects may I notice from receiving this medicine?  Side effects that you should report to your doctor or health care professional immediately:    allergic reactions like skin rash, itching or hives, swelling of the face, lips, or tongue    decreased sweating or a rise in body  temperature, especially in patients under 17 years old    difficulty breathing or tightening of the throat    feeling faint or lightheaded, falls    fever, sore throat, sores in your mouth, or bruising easily    hallucination, loss of contact with reality    irregular heartbeat    loss of appetite    redness, blistering, peeling or loosening of the skin, including inside the mouth    severe drowsiness, difficulty concentrating, or coordination problems    speech or language problems    sudden back pain, abdominal pain, pain when urinating, bloody or dark urine    suicidal thoughts or depression    unusual changes in behavior or mood    unusually weak or tired    vomiting  Side effects that usually do not require medical attention (report to your doctor or health care professional if they continue or are bothersome):    headache    nausea  What may interact with this medicine?  This medicine may interact with the following medications    alcohol    antihistamines for allergy, cough and cold    antiviral medicines for HIV or AIDS    certain antibiotics like rifabutin, rifampin    certain medicines for anxiety or sleep    certain medicines for depression like amitriptyline, fluoxetine, sertraline    certain medicines for seizures like carbamazepine, phenobarbital, phenytoin, topiramate    digoxin    diuretics like acetazolamide, dichlorphenamide    general anesthetics like halothane, isoflurane, methoxyflurane, propofol    local anesthetics like lidocaine, pramoxine, tetracaine    medicines that relax muscles for surgery    narcotic medicines for pain    phenothiazines like chlorpromazine, mesoridazine, prochlorperazine, thioridazine    quinidine  What if I miss a dose?  If you miss a dose, take it as soon as you can. If it is almost time for your next dose, take only that dose. Do not take double or extra doses.  Where should I keep my medicine?  Keep out of reach of children.  Store at room temperature between 15 and  30 degrees C (59 and 86 degrees F). Keep in a dry place protected from light. Throw away any unused medicine after the expiration date.  What should I tell my health care provider before I take this medicine?  They need to know if you have any of these conditions:    dehydrated    diarrhea    history of metabolic acidosis (too much acid in your blood)    ketogenic diet    kidney disease    liver disease    lung disease    osteoporosis    suicidal thoughts, plans, or attempt; a previous suicide attempt by you or a family member    an unusual or allergic reaction to zonisamide, sulfa drugs, other medicines, foods, dyes, or preservatives    pregnant or trying to get pregnant    breast-feeding  What should I watch for while using this medicine?  Visit your doctor or health care professional for regular checks on your progress. Wear a medical identification bracelet or chain to say you have epilepsy, and carry a card that lists all your medications.  It is important to take this medicine exactly as directed. When first starting treatment, your dose will need to be adjusted slowly. It may take weeks or months before your dose is stable. You should contact your doctor or health care professional if your seizures get worse or if you have any new types of seizures. Do not stop taking except on your doctor's advice. You may develop a severe reaction. Your doctor will tell you how much medicine to take.  You may get drowsy, dizzy, or have blurred vision. Do not drive, use machinery, or do anything that needs mental alertness until you know how this medicine affects you. To reduce dizzy or fainting spells, do not sit or stand up quickly, especially if you are an older patient. Alcohol can increase drowsiness and dizziness. Avoid alcoholic drinks.  Avoid extreme heat. This medicine can cause you to sweat less than normal. Your body temperature could increase to dangerous levels, which may lead to heat stroke.  This medicine may  increase the chance of developing metabolic acidosis. If left untreated, this can cause kidney stones, bone disease, or slowed growth in children. Symptoms include breathing fast, fatigue, loss of appetite, irregular heartbeat, or loss of consciousness. Call your doctor immediately if you experience any of these side effects. Also, tell your doctor about any surgery you plan on having while taking this medicine since this may increase your risk for metabolic acidosis.  This medicines may increase the risk of kidney stones. Drinking 6 to 8 glasses of water a day may help prevent the formation of kidney stones.  The use of this medicine may increase the chance of suicidal thoughts or actions. Pay special attention to how you are responding while on this medicine. Any worsening of mood, or thoughts of suicide or dying should be reported to your health care professional right away.  Women who become pregnant while using this medicine may enroll in the [place] Antiepileptic Drug Pregnancy Registry by calling 1-162.104.4136. This registry collects information about the safety of antiepileptic drug use during pregnancy.  NOTE:This sheet is a summary. It may not cover all possible information. If you have questions about this medicine, talk to your doctor, pharmacist, or health care provider. Copyright  2017 [employer]        Eletriptan tablets  What is this medicine?  ELETRIPTAN (el ih TRIP tan) is used to treat migraines with or without aura. An aura is a strange feeling or visual disturbance that warns you of an attack. It is not used to prevent migraines.  How should I use this medicine?  Take this medicine by mouth with a glass of water. Follow the directions on the prescription label. This medicine is taken at the first symptoms of a migraine. It is not for everyday use. If your migraine headache returns after one dose, you can take another dose as directed. You must leave at least 2 hours between doses, and  do not take more than 40 mg as a single dose. Do not take more than 80 mg total in any 24 hour period. If there is no improvement at all after the first dose, do not take a second dose without talking to your doctor or health care professional. Do not take your medicine more often than directed.  Talk to your pediatrician regarding the use of this medicine in children. Special care may be needed.  What side effects may I notice from receiving this medicine?  Side effects that you should report to your doctor or health care professional as soon as possible:    allergic reactions like skin rash, itching or hives, swelling of the face, lips, or tongue    fast, slow, or irregular heart beat    increased or decreased blood pressure    seizures    severe stomach pain and cramping, bloody diarrhea    signs and symptoms of a blood clot such as breathing problems; changes in vision; chest pain; severe, sudden headache; pain, swelling, warmth in the leg; trouble speaking; sudden numbness or weakness of the face, arm or leg    tingling, pain, or numbness in the face, hands, or feet  Side effects that usually do not require medical attention (report to your doctor or health care professional if they continue or are bothersome):    drowsiness    feeling warm, flushing, or redness of the face    headache    muscle cramps, pain    nausea, vomiting    unusually weak or tired  What may interact with this medicine?  Do not take this medicine with any of the following medications:    amiodarone    amphetamine, dextroamphetamine, or cocaine    aprepitant    certain antibiotics like clarithromycin, erythromycin, troleandomycin    cimetidine    conivaptan    dalfopristin; quinupristin    dihydroergotamine, ergotamine, ergoloid mesylates, methysergide, or ergot-type medication - do not take within 24 hours of taking eletriptan.    diltiazem    feverfew    imatinib    medicines for fungal infections like fluconazole, itraconazole,  ketoconazole, and voriconazole    medicines for HIV, AIDS    medicines for mental depression like fluvoxamine and nefazodone    mifepristone    other migraine medicines like almotriptan, sumatriptan, naratriptan, rizatriptan, zolmitriptan - do not take within 24 hours of taking eletriptan.    tryptophan    verapamil  This medicine may also interact with the following medications:    medicines for mental depression, anxiety or mood problems  What if I miss a dose?  This does not apply; this medicine is not for regular use.  Where should I keep my medicine?  Keep out of the reach of children.  Store at room temperature between 15 and 30 degrees C (59 and 86 degrees F). Throw away any unused medicine after the expiration date.  What should I tell my health care provider before I take this medicine?  They need to know if you have any of these conditions:    bowel disease or colitis    diabetes    family history of heart disease    fast or irregular heart beat    heart or blood vessel disease, angina (chest pain), or previous heart attack    high blood pressure    high cholesterol    history of stroke, transient ischemic attacks (TIAs or mini-strokes), or intracranial bleeding    kidney or liver disease    overweight    poor circulation    postmenopausal or surgical removal of uterus and ovaries    Raynaud's disease    seizure disorder    an unusual or allergic reaction to eletriptan, other medicines, foods, dyes, or preservatives    pregnant or trying to get pregnant    breast-feeding  What should I watch for while using this medicine?  Only take this medicine for a migraine headache. Take it if you get warning symptoms or at the start of a migraine attack. It is not for regular use to prevent migraine attacks.  You may get drowsy or dizzy. Do not drive, use machinery, or do anything that needs mental alertness until you know how this medicine affects you. To reduce dizzy or fainting spells, do not sit or stand up  quickly, especially if you are an older patient. Alcohol can increase drowsiness, dizziness and flushing. Avoid alcoholic drinks.  Smoking cigarettes may increase the risk of heart-related side effects from using this medicine.  If you take migraine medicines for 10 or more days a month, your migraines may get worse. Keep a diary of headache days and medicine use. Contact your healthcare professional if your migraine attacks occur more frequently.  NOTE:This sheet is a summary. It may not cover all possible information. If you have questions about this medicine, talk to your doctor, pharmacist, or health care provider. Copyright  2017 [employer]

## 2017-10-02 NOTE — MR AVS SNAPSHOT
After Visit Summary   10/2/2017    Ada Welch    MRN: [id number]           Patient Information     Date Of Birth          1970        Visit Information        Provider Department      10/2/2017 12:30 PM Fara Varner APRN [facility]        Today's Diagnoses     Chronic daily headache    -  1    Intractable migraine without aura and with status migrainosus          Care Instructions    will try zonisimide  25 mg daily for 3 days then 25 mg twice daily. Will start slow and increase the dose per patient's tolerance. Drink at least 6-8 glasses of water.   May try eletriptan (Relpax) as instructed for acute headache    erotonin syndrome symptoms usually occur within several hours of taking a new drug or increasing the dose of a drug you're already taking.   Signs and symptoms include:   Agitation or restlessness  Rapid heart rate and high blood pressure  Dilated pupils  Loss of muscle coordination or twitching muscles  Muscle rigidity  Heavy sweating  Diarrhea  Headache  Shivering, Goose bumps  Severe serotonin syndrome can be life-threatening. Signs and symptoms include:High fever, Seizures, Irregular heartbeat, Unconsciousness      Zonisamide capsules  What is this medicine?  ZONISAMIDE (lisset NIS a mide) is used to control partial seizures in adults with epilepsy.  How should I use this medicine?  Take this medicine by mouth with a glass of water. Follow the directions on the prescription label. Swallow whole. Do not break open the capsule. This medicine may be taken with or without food. Take your doses at regular intervals. Do not take your medicine more often than directed. Do not stop taking this medicine unless instructed by your doctor or health care professional.  A special MedGuide will be given to you by the pharmacist with each prescription and refill. Be sure to read this information carefully each time.  Talk to your pediatrician regarding the use of  this medicine in children. While this drug may be prescribed for children as young as 16 years of age for selected conditions, precautions do apply.  What side effects may I notice from receiving this medicine?  Side effects that you should report to your doctor or health care professional immediately:    allergic reactions like skin rash, itching or hives, swelling of the face, lips, or tongue    decreased sweating or a rise in body temperature, especially in patients under 17 years old    difficulty breathing or tightening of the throat    feeling faint or lightheaded, falls    fever, sore throat, sores in your mouth, or bruising easily    hallucination, loss of contact with reality    irregular heartbeat    loss of appetite    redness, blistering, peeling or loosening of the skin, including inside the mouth    severe drowsiness, difficulty concentrating, or coordination problems    speech or language problems    sudden back pain, abdominal pain, pain when urinating, bloody or dark urine    suicidal thoughts or depression    unusual changes in behavior or mood    unusually weak or tired    vomiting  Side effects that usually do not require medical attention (report to your doctor or health care professional if they continue or are bothersome):    headache    nausea  What may interact with this medicine?  This medicine may interact with the following medications    alcohol    antihistamines for allergy, cough and cold    antiviral medicines for HIV or AIDS    certain antibiotics like rifabutin, rifampin    certain medicines for anxiety or sleep    certain medicines for depression like amitriptyline, fluoxetine, sertraline    certain medicines for seizures like carbamazepine, phenobarbital, phenytoin, topiramate    digoxin    diuretics like acetazolamide, dichlorphenamide    general anesthetics like halothane, isoflurane, methoxyflurane, propofol    local anesthetics like lidocaine, pramoxine,  tetracaine    medicines that relax muscles for surgery    narcotic medicines for pain    phenothiazines like chlorpromazine, mesoridazine, prochlorperazine, thioridazine    quinidine  What if I miss a dose?  If you miss a dose, take it as soon as you can. If it is almost time for your next dose, take only that dose. Do not take double or extra doses.  Where should I keep my medicine?  Keep out of reach of children.  Store at room temperature between 15 and 30 degrees C (59 and 86 degrees F). Keep in a dry place protected from light. Throw away any unused medicine after the expiration date.  What should I tell my health care provider before I take this medicine?  They need to know if you have any of these conditions:    dehydrated    diarrhea    history of metabolic acidosis (too much acid in your blood)    ketogenic diet    kidney disease    liver disease    lung disease    osteoporosis    suicidal thoughts, plans, or attempt; a previous suicide attempt by you or a family member    an unusual or allergic reaction to zonisamide, sulfa drugs, other medicines, foods, dyes, or preservatives    pregnant or trying to get pregnant    breast-feeding  What should I watch for while using this medicine?  Visit your doctor or health care professional for regular checks on your progress. Wear a medical identification bracelet or chain to say you have epilepsy, and carry a card that lists all your medications.  It is important to take this medicine exactly as directed. When first starting treatment, your dose will need to be adjusted slowly. It may take weeks or months before your dose is stable. You should contact your doctor or health care professional if your seizures get worse or if you have any new types of seizures. Do not stop taking except on your doctor's advice. You may develop a severe reaction. Your doctor will tell you how much medicine to take.  You may get drowsy, dizzy, or have blurred vision. Do not drive, use  machinery, or do anything that needs mental alertness until you know how this medicine affects you. To reduce dizzy or fainting spells, do not sit or stand up quickly, especially if you are an older patient. Alcohol can increase drowsiness and dizziness. Avoid alcoholic drinks.  Avoid extreme heat. This medicine can cause you to sweat less than normal. Your body temperature could increase to dangerous levels, which may lead to heat stroke.  This medicine may increase the chance of developing metabolic acidosis. If left untreated, this can cause kidney stones, bone disease, or slowed growth in children. Symptoms include breathing fast, fatigue, loss of appetite, irregular heartbeat, or loss of consciousness. Call your doctor immediately if you experience any of these side effects. Also, tell your doctor about any surgery you plan on having while taking this medicine since this may increase your risk for metabolic acidosis.  This medicines may increase the risk of kidney stones. Drinking 6 to 8 glasses of water a day may help prevent the formation of kidney stones.  The use of this medicine may increase the chance of suicidal thoughts or actions. Pay special attention to how you are responding while on this medicine. Any worsening of mood, or thoughts of suicide or dying should be reported to your health care professional right away.  Women who become pregnant while using this medicine may enroll in the [place] Antiepileptic Drug Pregnancy Registry by calling 1-740.808.2461. This registry collects information about the safety of antiepileptic drug use during pregnancy.  NOTE:This sheet is a summary. It may not cover all possible information. If you have questions about this medicine, talk to your doctor, pharmacist, or health care provider. Copyright  2017 Gold Standard        Eletriptan tablets  What is this medicine?  ELETRIPTAN (el ih TRIP tan) is used to treat migraines with or without aura. An aura is a  strange feeling or visual disturbance that warns you of an attack. It is not used to prevent migraines.  How should I use this medicine?  Take this medicine by mouth with a glass of water. Follow the directions on the prescription label. This medicine is taken at the first symptoms of a migraine. It is not for everyday use. If your migraine headache returns after one dose, you can take another dose as directed. You must leave at least 2 hours between doses, and do not take more than 40 mg as a single dose. Do not take more than 80 mg total in any 24 hour period. If there is no improvement at all after the first dose, do not take a second dose without talking to your doctor or health care professional. Do not take your medicine more often than directed.  Talk to your pediatrician regarding the use of this medicine in children. Special care may be needed.  What side effects may I notice from receiving this medicine?  Side effects that you should report to your doctor or health care professional as soon as possible:    allergic reactions like skin rash, itching or hives, swelling of the face, lips, or tongue    fast, slow, or irregular heart beat    increased or decreased blood pressure    seizures    severe stomach pain and cramping, bloody diarrhea    signs and symptoms of a blood clot such as breathing problems; changes in vision; chest pain; severe, sudden headache; pain, swelling, warmth in the leg; trouble speaking; sudden numbness or weakness of the face, arm or leg    tingling, pain, or numbness in the face, hands, or feet  Side effects that usually do not require medical attention (report to your doctor or health care professional if they continue or are bothersome):    drowsiness    feeling warm, flushing, or redness of the face    headache    muscle cramps, pain    nausea, vomiting    unusually weak or tired  What may interact with this medicine?  Do not take this medicine with any of the following  medications:    amiodarone    amphetamine, dextroamphetamine, or cocaine    aprepitant    certain antibiotics like clarithromycin, erythromycin, troleandomycin    cimetidine    conivaptan    dalfopristin; quinupristin    dihydroergotamine, ergotamine, ergoloid mesylates, methysergide, or ergot-type medication - do not take within 24 hours of taking eletriptan.    diltiazem    feverfew    imatinib    medicines for fungal infections like fluconazole, itraconazole, ketoconazole, and voriconazole    medicines for HIV, AIDS    medicines for mental depression like fluvoxamine and nefazodone    mifepristone    other migraine medicines like almotriptan, sumatriptan, naratriptan, rizatriptan, zolmitriptan - do not take within 24 hours of taking eletriptan.    tryptophan    verapamil  This medicine may also interact with the following medications:    medicines for mental depression, anxiety or mood problems  What if I miss a dose?  This does not apply; this medicine is not for regular use.  Where should I keep my medicine?  Keep out of the reach of children.  Store at room temperature between 15 and 30 degrees C (59 and 86 degrees F). Throw away any unused medicine after the expiration date.  What should I tell my health care provider before I take this medicine?  They need to know if you have any of these conditions:    bowel disease or colitis    diabetes    family history of heart disease    fast or irregular heart beat    heart or blood vessel disease, angina (chest pain), or previous heart attack    high blood pressure    high cholesterol    history of stroke, transient ischemic attacks (TIAs or mini-strokes), or intracranial bleeding    kidney or liver disease    overweight    poor circulation    postmenopausal or surgical removal of uterus and ovaries    Raynaud's disease    seizure disorder    an unusual or allergic reaction to eletriptan, other medicines, foods, dyes, or preservatives    pregnant or trying to get  pregnant    breast-feeding  What should I watch for while using this medicine?  Only take this medicine for a migraine headache. Take it if you get warning symptoms or at the start of a migraine attack. It is not for regular use to prevent migraine attacks.  You may get drowsy or dizzy. Do not drive, use machinery, or do anything that needs mental alertness until you know how this medicine affects you. To reduce dizzy or fainting spells, do not sit or stand up quickly, especially if you are an older patient. Alcohol can increase drowsiness, dizziness and flushing. Avoid alcoholic drinks.  Smoking cigarettes may increase the risk of heart-related side effects from using this medicine.  If you take migraine medicines for 10 or more days a month, your migraines may get worse. Keep a diary of headache days and medicine use. Contact your healthcare professional if your migraine attacks occur more frequently.  NOTE:This sheet is a summary. It may not cover all possible information. If you have questions about this medicine, talk to your doctor, pharmacist, or health care provider. Copyright  2017 Gold Standard                Follow-ups after your visit        Your next 10 appointments already scheduled     Oct 04, 2017  8:00 AM CDT   (Arrive by 7:45 AM)   New Patient Visit with Seth Galeano MD   [facility] ([facility])    [address] [place] [address] [phone]            Oct 04, 2017  1:00 PM CDT   (Arrive by 12:45 PM)   New Patient Visit with Juana Griffith MD   [facility] ([facility])    [address] [place] [address] [phone]            Oct 05, 2017  3:30 PM CDT   (Arrive by 3:15 PM)   Implantable Loop Recorder with Uc Cv Device 1   [facility] ([facility])    [address] [place] [id number]    [phone]            Oct 05, 2017  4:00 PM CDT   (Arrive by 3:45 PM)   NEW ARRHYTHMIA with Kirk Russell MD   [facility] ([facility])    [address] [place] [address]  [address] [facility] [address]   [phone]            Oct 06, 2017  9:00 AM CDT   XR VIDEO SPEECH EVALUATION WITH ESOPHAGRAM with UCXR2, UC GIGU RAD   [facility] ([facility])    [address] [place] [address]  [address] [facility] [id number] [phone]           Please bring a list of your current medicines to your exam. (Include vitamins, minerals and over-the-counter medicines.) Leave your valuables at home.  Tell the doctor if there is a chance you could be pregnant.  Do not eat for 4 hours before the exam. Keep drinking clear liquids until 2 hours before the exam.  You may take pain medicine (with a sip of water) up to 4 hours before the exam.  Do not swallow any other medicines unless your doctor tells you to. Talk to your doctor to be sure it s safe to stop your medicines.  Please call the Imaging Department at your exam site with any questions.            Oct 06, 2017  9:00 AM CDT   Video Swallow with JAYANT Shaw   [facility] ([facility])    [address] [place] [id number] [phone]           Please check in for this visit in Imaging on the 1st floor.            Oct 27, 2017  9:20 AM CDT   (Arrive by 9:05 AM)   Return Visit with Faiza Martinez MD   [facility] ([facility])    [address] [place] [id number] [phone]            Nov 13, 2017 10:30 AM CST   (Arrive by 10:15 AM)   Return Visit with Ellyn Rivera MD   [facility] ([facility])    [address] [place] [id number] [phone]            Dec 06, 2017 11:15 AM  "CST   (Arrive by 11:00 AM)   Return Visit with Wade Singh MD   [facility] ([facility])    [address] [address] [phone]              Who to contact     Please call your clinic at [phone] to:    Ask questions about your health    Make or cancel appointments    Discuss your medicines    Learn about your test results    Speak to your doctor   If you have compliments or concerns about an experience at your clinic, or if you wish to file a complaint, please contact [facility] Patient Relations at [phone] or email us at [email or web address]         Additional Information About Your Visit        [email or web address] Information     [employer] gives you secure access to your electronic health record. If you see a primary care provider, you can also send messages to your care team and make appointments. If you have questions, please call your primary care clinic.  If you do not have a primary care provider, please call [phone] and they will assist you.      [employer] is an electronic gateway that provides easy, online access to your medical records. With [employer], you can request a clinic appointment, read your test results, renew a prescription or communicate with your care team.     To access your existing account, please contact your [facility] or call [phone] for assistance.        Care EveryWhere ID     This is your Care EveryWhere ID. This could be used by other organizations to access your [place] medical records  [id number]        Your Vitals Were     Pulse Temperature Respirations Height Pulse Oximetry Breastfeeding?    106 98.3  F (36.8  C) 20 1.702 m (5' 7\") 99% No    BMI (Body Mass Index)                   31.17 kg/m2            Blood Pressure from Last 3 Encounters:   10/02/17 130/79   09/28/17 95/60   09/26/17 122/86 "    Weight from Last 3 Encounters:   10/02/17 90.3 kg (199 lb)   09/28/17 90.9 kg (200 lb 6.4 oz)   09/26/17 91.2 kg (201 lb)              Today, you had the following     No orders found for display         Today's Medication Changes          These changes are accurate as of: 10/2/17  1:52 PM.  If you have any questions, ask your nurse or doctor.               Start taking these medicines.        Dose/Directions    eletriptan 20 MG tablet   Commonly known as:  RELPAX   Used for:  Chronic daily headache, Intractable migraine without aura and with status migrainosus   Started by:  Fara Varner APRN CNP        Dose:  20 mg   Take 1 tablet (20 mg) by mouth at onset of headache for migraine May repeat in 2 hours. Max 4 tablets/24 hours.   Quantity:  18 tablet   Refills:  1       zonisamide 25 MG capsule   Commonly known as:  Zonegran   Used for:  Chronic daily headache   Started by:  Fara Varner APRN CNP        Take 25 mg daily for 3 days then 25 mg twice daily   Quantity:  60 capsule   Refills:  3         Stop taking these medicines if you haven't already. Please contact your care team if you have questions.     rizatriptan 5 MG ODT tab   Commonly known as:  MAXALT-MLT   Stopped by:  Fara Varner APRN CNP                Where to get your medicines      These medications were sent to [place] [address] [place], [place] - [address]  [address] [facility] [address]     Phone:  [phone]     eletriptan 20 MG tablet    zonisamide 25 MG capsule                Primary Care Provider Office Phone # Fax #    Ellyn Rivera [phone]       [address] [facility] [place] [address]  [facility] [address]        Equal Access to Services     CHRISTINA GUY : Ashutosh Slater, herve oakes, qaybta kaalmadonnell arellano, odette tang. [facility] [phone].    ATENCIÓN: Si habla esptim, tiene a sweeney disposición servicios  jerome de asistencia lingüística. Isaías carr [phone].    We comply with applicable federal civil rights laws and [place] laws. We do not discriminate on the basis of race, color, national origin, age, disability, sex, sexual orientation, or gender identity.            Thank you!     Thank you for choosing [facility]  for your care. Our goal is always to provide you with excellent care. Hearing back from our patients is one way we can continue to improve our services. Please take a few minutes to complete the written survey that you may receive in the mail after your visit with us. Thank you!             Your Updated Medication List - Protect others around you: Learn how to safely use, store and throw away your medicines at www.disposemymeds.org.          This list is accurate as of: 10/2/17  1:52 PM.  Always use your most recent med list.                   Brand Name Dispense Instructions for use Diagnosis    ADVAIR DISKUS 250-50 MCG/DOSE diskus inhaler   Generic drug:  fluticasone-salmeterol      Inhale 1 puff into the lungs 2 times daily as needed        albuterol 108 (90 BASE) MCG/ACT Inhaler    PROAIR HFA/PROVENTIL HFA/VENTOLIN HFA     Inhale 2 puffs into the lungs        amitriptyline 10 MG tablet    ELAVIL    90 tablet    Take 3 tablets (30 mg) by mouth At Bedtime    Chronic migraine without aura without status migrainosus, not intractable       buprenorphine 10 MCG/HR WK patch    BUTRANS    4 patch    Place 1 patch onto the skin every 7 days    Postlaminectomy syndrome of lumbar region       cetirizine 10 MG tablet    zyrTEC     Take 10 mg by mouth daily        COMPRESSION STOCKINGS     3 each    1 each daily 20-30 mmHg Thigh Length measure and fit, color and style per patient preference. Doff n jose per need.    Orthostatic hypotension       eletriptan 20 MG tablet    RELPAX    18 tablet    Take 1 tablet (20 mg) by mouth at onset of headache for migraine May repeat in 2 hours. Max 4  tablets/24 hours.    Chronic daily headache, Intractable migraine without aura and with status migrainosus       EPINEPHrine 0.3 MG/0.3ML injection 2-pack    EPIPEN/ADRENACLICK/or ANY BX GENERIC EQUIV     Inject 0.3 mg into the muscle        etodolac 400 MG tablet    LODINE     Take 400 mg by mouth        famotidine 20 MG tablet    PEPCID    90 tablet    Take 1 tablet (20 mg) by mouth At Bedtime    Hiatal hernia, Mast cell disorder       fluticasone 50 MCG/ACT spray    FLONASE     2 sprays        ketamine (KETALAR) 5%-gabapentin (NEURONTIN) 8%-lidocaine 2.5% 5%-8% Gel topical PLO cream     30 g    Apply 1 g topically 3 times daily as needed    Postlaminectomy syndrome of lumbosacral region, Postlaminectomy syndrome of lumbar region       meloxicam 7.5 MG tablet    MOBIC    15 tablet    Take 1 tablet (7.5 mg) by mouth daily    Chronic migraine without aura without status migrainosus, not intractable       metoclopramide 5 MG tablet    REGLAN    20 tablet    Take 1 tablet (5 mg) by mouth every 6 hours as needed    Intractable chronic migraine without aura and with status migrainosus       MULTIVITAMIN PO      Take by mouth daily        omeprazole 20 MG CR capsule    priLOSEC    90 capsule    Take 1 capsule (20 mg) by mouth daily    Hiatal hernia       ondansetron 4 MG ODT tab    ZOFRAN-ODT    60 tablet    Take 1-2 tablets (4-8 mg) by mouth every 12 hours as needed for nausea    Migraine without status migrainosus, not intractable, unspecified migraine type       polyethylene glycol powder    MIRALAX/GLYCOLAX          PROBIOTIC DAILY PO      Take by mouth 2 times daily        SUMAtriptan 6 MG/0.5ML pen injector kit    IMITREX STATDOSE    2 kit    Inject 0.5 mLs (6 mg) Subcutaneous at onset of headache for migraine (may repeat once after 2 hrs) May repeat in 1 hour. Max 12 mg/24 hours.    Chronic migraine without aura without status migrainosus, not intractable       SYNTHROID 25 MCG tablet   Generic drug:   levothyroxine      Take 25 mcg by mouth daily        tiZANidine 4 MG tablet    ZANAFLEX    90 tablet    Take 2 tablets (8 mg) by mouth 3 times daily as needed for muscle spasms    Post laminectomy syndrome       traMADol 50 MG tablet    ULTRAM    15 tablet    Take 1 tablet (50 mg) by mouth every 6 hours as needed for pain maximum 4 tablet(s) per day    Migraine with aura and without status migrainosus, not intractable       zonisamide 25 MG capsule    Zonegran    60 capsule    Take 25 mg daily for 3 days then 25 mg twice daily    Chronic daily headache

## 2017-10-02 NOTE — LETTER
10/2/2017       RE: Ada Welch  [address] [place] [address]     Dear Colleague,    Thank you for referring your patient, Ada Welch, to the [facility] NEUROLOGY at [facility]. Please see a copy of my visit note below.    Re: Ada Welch      MRN# [id number]  YOB: 1970  Date of Visit:     OUTPATIENT NEUROLOGY VISIT NOTE    Reason for Visit/Chief Complaint:      Interval History/History of Present Illness  Ada Welch is a -year-old right(left)-handed presents to the clinic today for History of s/p  removal of posterior instrumentation (screws and guadalupe) with exploration of fusion, chronic low back pain,  Ehler Danlos syndrome, chronic headache. Has seen Dr Martinez at the [facility]. Patient is currently has a Butran patch for his lower legs/back but it does not help with pain flares and does not do anything for headaches.     Patient reports that she was seen at [facility]urosurgery specialist with [facility]. Patient reports that Jesus Alberto Sofia MD with [facility]. Patient is going back to see Dr Puri in November 2018. No notes from Dr Sofia available for review. Patient is flying to [place] for TMJ specialist who works with [facility].   Patient has been seeing Dr Martinez from Pain Clinic.   Patient was seen for daily headache for 3 months. Reports that she gets dizzy, facial numbness with vertical position. Patient reports that she takes rizatriptan 9 days per months (can get only 18 tablets) and sumatriptan injectable 4 doses. Patient reports that she run out rizatriptan a week ago.   Reports that Botox is not an option. Patient is seeing Dr Martinez on 10/27 to discuss her recent visit with Dr Holland. Patient wears cervical collar.   Patient had tubal ligation. Patient   Patient reports history of Chiari malformation and EDS.   Treatments Tried:  Amitriptyline 10 mg at bedtime started just one week ago by   "Juan  Tylenol and Benadryl daily for 2 month  Physical therapy which made her headaches worse  Sumatriptan injectable helps but causing \"muscle tightness\"\"sickness\" and \"dizziness\"  Rizatriptan -would help for a short time  Tizanidine prescribed by Dr Martinez  Medjackelin Dose pack-may be some relief  DHE injection which helped with cluster headaches in the past. Tried at her last hospitalization but would reduce her \"BP\"  metoclopramide helped with nausea  Ketorolac -helped but for a short time  Promethazine  Gabapentin -\"does nothing\" for me  Lyrica -\"no benefit\" from that   Topiramate-\"allergic\" to it, patient does not remember what happen.   Zonegran-remember the name but \"does not remember\" any severe reaction.     Neurodiagnostic Testing  CT  MRI  MRI brain without and with contrast  MRA of the head without contrast  Neck MRA without and with contrast     Provided History:  look for carotid dissection- hx of Tom Danlos-  hx of headache with visual problems and left facial numbness.     Comparison:  CT abdomen 16 2013       Technique:   Brain MRI:  Axial diffusion, FLAIR, T2-weighted, susceptibility, and  coronal T1-weighted images were obtained without intravenous contrast.  Following intravenous gadolinium-based contrast administration, axial  and coronal T1-weighted images were obtained.    Head MRA: 3D time-of-flight MRA of the [place] was performed  without intravenous contrast.  Neck MRA:  Limited non contrast 2DTOF images were obtained of the  mid-cervical region. Following intravenous gadolinium-based contrast  administration, a contrast enhanced MRA of the neck/cervical vessels  was performed.  Three-dimensional reconstructions of the neck and head MRA were  created, which were reviewed by the radiologist.  Head MRV: 2D time-of-flight MR venogram (MRV) of the head was  performed without intravenous contrast. Following intravenous  gadolinium-based contrast administration, a contrast enhanced MRV " of  the intracranial vessels was performed.     Dose: 7.5 mL Gadavist     Findings:   Brain MRI: Axial diffusion weighted images demonstrate no definite  acute infarct. On the FLAIR images, there is no significant  abnormality. There is no definite intracranial hemorrhage on  susceptibility images. Ventricles are proportionate to the cerebral  sulci. Contrast-enhanced images of the brain demonstrate no abnormal  intra- or extra-axial enhancement. Ethmoid sinus mucosal thickening.  Maxillary sinus mucosal thickening. Fluid level in the left greater  than right maxillary sinus.     Head MRA demonstrates no definite aneurysm or stenosis of the major  intracranial arteries. The anterior communicating artery is patent.  Regarding the posterior communicating arteries, both are patent.     Neck MRA demonstrates patent major cervical arteries. The normal  distal right internal carotid artery measures 6 mm. The normal distal  left internal carotid artery measures 6 mm. Antegrade flow in the  major cervical vasculature. On the T2 and T1 fat saturated neck  imaging there is no evidence for dissection. Bilateral prominent lymph  nodes, likely reactive     Head MRV demonstrates no definite thrombosis or stenosis of the major  intracranial dural sinuses or deep cerebral veins. Postcontrast MRV  images do not demonstrate any definite intraluminal filling defect.         Impression:  1. No evidence of acute infarction or intracranial hemorrhage.  2. No abnormal enhancing lesions intracranially.  3. Head MRA demonstrates no definite aneurysm or stenosis of the major  intracranial arteries.  4. Neck MRA demonstrates patent major cervical arteries. No evidence  for dissection.  5. Normal MRV of the brain.  6. Air-fluid level in the left greater than right maxillary sinus, may  represent sinusitis in the correct clinical context.     I have personally reviewed the examination and initial interpretation  and I agree with the  findings.     KOREY OVERTON MD  Lab    Past Medical History reviewed and verified with the patient  Past Surgical History reviewed and verified with the patient  Family History reviewed and verified with the patient  Social History   Substance Use Topics     Smoking status: Former Smoker     Packs/day: 0.20     Years: 10.00     Types: Cigarettes     Start date: 1/1/1991     Quit date: 12/1/2013     Smokeless tobacco: Never Used      Comment: I smoked on and off during specified dates.     Alcohol use Yes      Comment: couple per year    reviewed and verified with the patient    Current Outpatient Prescriptions   Medication Sig Dispense Refill     levothyroxine (SYNTHROID) 25 MCG tablet Take 25 mcg by mouth daily       meloxicam (MOBIC) 7.5 MG tablet Take 1 tablet (7.5 mg) by mouth daily 15 tablet 0     amitriptyline (ELAVIL) 10 MG tablet Take 3 tablets (30 mg) by mouth At Bedtime 90 tablet 2     famotidine (PEPCID) 20 MG tablet Take 1 tablet (20 mg) by mouth At Bedtime 90 tablet 1     omeprazole (PRILOSEC) 20 MG CR capsule Take 1 capsule (20 mg) by mouth daily 90 capsule 1     ondansetron (ZOFRAN-ODT) 4 MG ODT tab Take 1-2 tablets (4-8 mg) by mouth every 12 hours as needed for nausea 60 tablet 1     metoclopramide (REGLAN) 5 MG tablet Take 1 tablet (5 mg) by mouth every 6 hours as needed 20 tablet 1     traMADol (ULTRAM) 50 MG tablet Take 1 tablet (50 mg) by mouth every 6 hours as needed for pain maximum 4 tablet(s) per day 15 tablet 0     rizatriptan (MAXALT-MLT) 5 MG ODT tab TAKE 1-2 TABLETS (5-10 MG) BY MOUTH AT ONSET OF HEADACHE FOR MIGRAINE (MAX 30 MG IN 24 HOURS) 20 tablet 6     tiZANidine (ZANAFLEX) 4 MG tablet Take 2 tablets (8 mg) by mouth 3 times daily as needed for muscle spasms 90 tablet 3     buprenorphine (BUTRANS) 10 MCG/HR WK patch Place 1 patch onto the skin every 7 days 4 patch 0     EPINEPHrine (EPIPEN/ADRENACLICK/OR ANY BX GENERIC EQUIV) 0.3 MG/0.3ML injection 2-pack Inject 0.3 mg into the  "muscle       albuterol (PROAIR HFA/PROVENTIL HFA/VENTOLIN HFA) 108 (90 BASE) MCG/ACT Inhaler Inhale 2 puffs into the lungs       fluticasone (FLONASE) 50 MCG/ACT spray 2 sprays       etodolac (LODINE) 400 MG tablet Take 400 mg by mouth       COMPRESSION STOCKINGS 1 each daily 20-30 mmHg Thigh Length measure and fit, color and style per patient preference. Doff n jose per need. 3 each 3     SUMAtriptan (IMITREX STATDOSE) 6 MG/0.5ML pen injector kit Inject 0.5 mLs (6 mg) Subcutaneous at onset of headache for migraine (may repeat once after 2 hrs) May repeat in 1 hour. Max 12 mg/24 hours. 2 kit 1     ketamine, KETALAR, 5%-gabapentin, NEURONTIN, 8%-lidocaine 2.5% 5%-8% GEL topical PLO cream Apply 1 g topically 3 times daily as needed 30 g 3     fluticasone-salmeterol (ADVAIR DISKUS) 250-50 MCG/DOSE diskus inhaler Inhale 1 puff into the lungs 2 times daily as needed        cetirizine (ZYRTEC) 10 MG tablet Take 10 mg by mouth daily        polyethylene glycol (MIRALAX/GLYCOLAX) powder        Multiple Vitamins-Minerals (MULTIVITAMIN PO) Take by mouth daily       Probiotic Product (PROBIOTIC DAILY PO) Take by mouth 2 times daily     reviewed and verified with the patient    Review of Systems:   A 10-point ROS including constitutional, eyes, respiratory, cardiovascular, gastroenterology, genitourinary, integumentary, musculoskeletal, neurology and psychiatric were all negative except as mentioned in the interval history.    General Exam:   /79  Pulse 106  Temp 98.3  F (36.8  C)  Resp 20  Ht 1.702 m (5' 7\")  Wt 90.3 kg (199 lb)  SpO2 99%  Breastfeeding? No  BMI 31.17 kg/m2  GEN: Awake, NAD; good eye contact, responses appropriately, patient headache today 8.5/10   HEENT: Head atraumatic/Normocephalic. Scalp normal. Pupils equally round, 4 mm, reactive to light and accommodation, sclera and conjunctiva normal. Fundoscopic examination reveals normal vessels no papilledema.   Neck: Easily moveable without " resistance  Heart: S1/S2 appreciated, RRR, no m/r/g, no carotid bruits  Lungs:Lungs are clear to auscultation bilaterally, no wheezes or crackles.   Neurological Examination:  The patient is alert and oriented times four. Has good attention and concentration. Speech is fluent without dysarthria. EOM intact. There is no nystagmus. Has conjugated gaze. Face is symmetrical. Intact and symmetrical eyebrow and lid raise and eyelid closure, smiles. Hearing Intact to conversation speech. The palates elevates symmetrical. The tongue protrudes midline with no atrophy or fasciculations. Strength  5/5 in the upper and lower extremities bilaterally. Sensation is intact to  touch throughout.  Reflexes symmetrical at biceps, triceps, brachioradialis, patellar, and Achilles. Negative Babinski with downgoing toes bilaterally. Coordination reveals finger-nose-finger, rapid alternating movements with normal speed and accuracy. Normal casual gait.    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  Patient's headache worsen with being in the upright position since July 2017. History of back surgery with complications and long recovery in October of 2014. Headaches were sporadic before but now consist. Cervical and brain MRI on 7/24/2017 and no acute findings.     Patient had a comprehensive evaluation in [place] with a neurologist who specializes with [facility]. Patient is seeing Dr Márquez a neurologist on 10/4/2017-will wait for his recommendations.   In meanwhile, will try zonisimide  25 mg daily for 3 days then 25 mg twice daily. Will start slow and increase the dose per patient's tolerance. Drink at least 6-8 glasses of water. No history of nephrolithiasis. Patient had tubal ligation.   May try eletriptan (Relpax) as instructed for acute headache    Prescription eletriptan and zonisimide provided. Correct use and course provided. Expected benefits and typical  side effects reviewed. Safety of concomitant medications and interactions reviewed. Patient taught signs and symptoms of adverse reactions and allergies. Patient understands teaching and accepts risks of prescribed medication regimen.    Serotonin syndrome symptoms usually occur within several hours of taking a new drug or increasing the dose of a drug you're already taking.   Signs and symptoms include:   Agitation or restlessness  Rapid heart rate and high blood pressure  Dilated pupils  Loss of muscle coordination or twitching muscles  Muscle rigidity  Heavy sweating  Diarrhea  Headache  Shivering, Goose bumps  Severe serotonin syndrome can be life-threatening. Signs and symptoms include:High fever, Seizures, Irregular heartbeat, Unconsciousness        I discussed all my recommendation with Ada Welch. The patient verbalizes understanding and comfortable with the plan. The patient has our clinic phone number to call with any questions or concerns. All of the patient's questions were answered from the best of my current knowledge. Follow up in    weeks or sooner if needed.      Thank you for letting me be a part of the treatment team for Ada Welch      Time spent with pt answering questions, discussing findings, counseling and coordinating care was more than 50% the appointment time,   minutes.         FIDELINA Carlisle, CNP  [facility]      Again, thank you for allowing me to participate in the care of your patient.      Sincerely,    FIDELINA Ridley CNP

## 2017-10-03 DIAGNOSIS — M96.1 POSTLAMINECTOMY SYNDROME OF LUMBAR REGION: ICD-10-CM

## 2017-10-03 DIAGNOSIS — G43.709 CHRONIC MIGRAINE WITHOUT AURA WITHOUT STATUS MIGRAINOSUS, NOT INTRACTABLE: ICD-10-CM

## 2017-10-03 RX ORDER — BUPRENORPHINE 10 UG/H
1 PATCH TRANSDERMAL
Qty: 4 PATCH | Refills: 0 | Status: SHIPPED | OUTPATIENT
Start: 2017-10-03 | End: 2017-10-31

## 2017-10-03 RX ORDER — MELOXICAM 7.5 MG/1
7.5 TABLET ORAL DAILY
Qty: 7 TABLET | Refills: 0 | Status: SHIPPED | OUTPATIENT
Start: 2017-10-03 | End: 2017-10-10

## 2017-10-03 NOTE — PROGRESS NOTES
"GI CLINIC VISIT - NEW PATIENT    CC/REFERRING MD:  [facility]  REASON FOR CONSULTATION:  Eosinophilic esophagitis    ASSESSMENT/PLAN:  46-year-old female with Tom-Danlos syndrome who had an upper endoscopy where she was found to have a hiatal hernia, rings in the esophagus and biopsies and high eosinophils in the distal biopsies and mid biopsies with minimal eosinophils who has been having mostly nausea postprandially as well as bloating and changing his stool consistency between constipation and diarrhea.  Regarding the patient's possible eosinophilic esophagitis, my concern is that she may have significant reflux to hiatal hernia and due to the pattern of eosinophils in the biopsies therefore at this moment will plan on doing a gastric emptying study is seen says that she has prandial nausea and abdominal pain which could be related to gastroparesis which could make reflux worse leading to high eosinophils. We'll also obtain an esophagram to crudely evaluate the amount of reflux.  The patient does complain of \"IBS\" during which she has a lot of bloating and abdominal pain with diarrhea sometimes constipation and also gastroparesis pain which she attributes to her Tom-Danlos syndrome. We'll have the patient be further evaluated and general GI clinic for the symptoms.    PLAN:  - Gastric emptying study    - Esophagram with video swallow study    - Referral to general GI clinic    - Pending the results will plan repeating the upper endoscopy for further evaluation.    Thank you for this consultation.  It was a pleasure to participate in the care of this patient; please contact us with any further questions.  A total of 30 minutes was spent with this patient, >50% of which was counseling regarding the above delineated issues.    Jerad Lyons MD    [facility] - Department of Medicine  Division of Gastroenterology    Note created using Dragon transcription " - howard and abdominal drain management per primary team   software  -------------------------------------------------------------------------------------------------------------------  HPI:  46-year-old female with Tom-Danlos syndrome who had an upper endoscopy where she was found to have a hiatal hernia, rings in the esophagus and biopsies and high eosinophils in the distal biopsies and mid biopsies with minimal eosinophils who has been having mostly nausea postprandially as well as bloating and changing his stool consistency between constipation and diarrhea. The patient complains of intermittent symptoms of dysphagia, she has not found a consistent pattern of the third trouble swallowing sometimes it sporadic. She does not feel consistent reflux. But sometimes 2 filling she eats something supportive presenting of the food will come back up. She does complain of intermittent diarrhea and sometimes constipation and does not know her has identified any specific triggers. She does complain of significant nausea after eating as well as occasional pain in the epigastric area after eating.    ROS:  10pt ROS performed and otherwise negative.    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     Chronic sinusitis 00/2007    Diagnosed with chronic pansinusitis 2016     Chronic tonsillitis 1980    Had tonsils removed 02/1981, rheumatic fever     Gastroesophageal reflux disease 3/1/2017    I have large hiatal hernia     Hernia, abdominal 10/2016    Hiatal Hernia     Hiatal hernia 2016    Have adeline hiatel hernia     Hoarseness Unsure    It comes and goes     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     Migraines 1/1/2007    Unsure of exact date     Nasal  polyps 2013    Were revoved 03/2017, benign     Pneumonia Unsure    Have had pneumonia several times     Swelling, mass, or lump in head and neck 08/2016    Lymph node has been growing for last year     Thyroid disease 01/01/2008     Patient Active Problem List   Diagnosis     Pain syndrome, chronic     s/p TLIF L4-5,L5-S1 with solid arthrodesis     Tom-Danlos syndrome     Postlaminectomy syndrome, lumbar region     Headache     Cervicalgia     Slow transit constipation     Urinary urgency     Nocturia     Cardiac device in situ- [employer] Implanted Loop Recorder (ILR)     PREVIOUS ENDOSCOPY:  Endoscopy and colonoscopy done in March 2017 under Mac anesthesia, colonoscopy found one small hyperplastic polyp. An upper endoscopy showed ringed esophagus with distal biopsies showing a high number of eosinophils in the mid esophageal biopsies showed minimal eosinophils     PERTINENT MEDICATIONS:  Current Outpatient Prescriptions   Medication     amitriptyline (ELAVIL) 10 MG tablet     rizatriptan (MAXALT-MLT) 5 MG ODT tab     tiZANidine (ZANAFLEX) 4 MG tablet     buprenorphine (BUTRANS) 10 MCG/HR WK patch     EPINEPHrine (EPIPEN/ADRENACLICK/OR ANY BX GENERIC EQUIV) 0.3 MG/0.3ML injection 2-pack     albuterol (PROAIR HFA/PROVENTIL HFA/VENTOLIN HFA) 108 (90 BASE) MCG/ACT Inhaler     fluticasone (FLONASE) 50 MCG/ACT spray     etodolac (LODINE) 400 MG tablet     COMPRESSION STOCKINGS     doxycycline (VIBRAMYCIN) 100 MG capsule     SUMAtriptan (IMITREX STATDOSE) 6 MG/0.5ML pen injector kit     ketamine, KETALAR, 5%-gabapentin, NEURONTIN, 8%-lidocaine 2.5% 5%-8% GEL topical PLO cream     levothyroxine (SYNTHROID) 25 mcg/mL SUSP     omeprazole (PRILOSEC) 20 MG CR capsule     fluticasone-salmeterol (ADVAIR DISKUS) 250-50 MCG/DOSE diskus inhaler     cetirizine (ZYRTEC) 10 MG tablet     polyethylene glycol (MIRALAX/GLYCOLAX) powder     Multiple Vitamins-Minerals (MULTIVITAMIN PO)     Probiotic Product (PROBIOTIC DAILY PO)      senna (SENOKOT) 8.6 MG tablet     metoclopramide (REGLAN) 5 MG tablet     [DISCONTINUED] amitriptyline (ELAVIL) 10 MG tablet     ondansetron (ZOFRAN-ODT) 4 MG ODT tab     No current facility-administered medications for this visit.      Medications reviewed with patient today, see Medication List/Assessment for details.  No other NSAID/anticoagulation reported by patient.  No other OTC/herbal/supplements reported by patient.    SOCIAL HISTORY:  Social History     Social History     Marital status:      Spouse name: N/A     Number of children: N/A     Years of education: N/A     Occupational History     Not on file.     Social History Main Topics     Smoking status: Former Smoker     Packs/day: 0.20     Years: 10.00     Types: Cigarettes     Start date: 1/1/1991     Quit date: 12/1/2013     Smokeless tobacco: Never Used      Comment: I smoked on and off during specified dates.     Alcohol use No     Drug use: No     Sexual activity: Yes     Partners: Male     Birth control/ protection: Female Surgical     Other Topics Concern     Not on file     Social History Narrative    5 kids: 24, 23, 19, 13, 7 yo     works at [employer]           FAMILY HISTORY:  Family History   Problem Relation Age of Onset     Connective Tissue Disorder Mother      eds     Connective Tissue Disorder Sister      eds     CANCER Father      Spinal tumor grew into spinal cord, went in brain     Migraines Father      DIABETES Maternal Grandmother      Elderly diabetes     HEART DISEASE Maternal Grandmother      Hypertension Maternal Grandmother      DIABETES Paternal Grandmother      Elderly diabetes     DIABETES Paternal Grandfather      Elderly diabetes     Asthma Sister      Pediatric Asthma     Migraines Sister      Asthma Son      Pediatric Asthma     Thyroid Disease Sister      ,     Migraines Sister      No colon/panc/other GI CA, no other [facility]-related Alex.  No IBD/celiac, no AI/liver  disease.    PHYSICAL EXAMINATION:  /88  Pulse 87  Temp 98.1  F (36.7  C) (Oral)  Resp 18  Wt 92.7 kg (204 lb 5.9 oz)  SpO2 90%  BMI 32.99 kg/m2  Wt:   Wt Readings from Last 2 Encounters:   09/12/17 92.7 kg (204 lb 5.9 oz)   08/30/17 92.5 kg (204 lb)      Body mass index is 32.99 kg/(m^2).   Constitutional: cooperative, pleasant, not dyspneic/diaphoretic, no acute distress  Eyes: Sclera anicteric/injected  Ears/nose/mouth/throat: Normal oropharynx without ulcers or exudate, mucus membranes moist, hearing intact  Neck: supple, thyroid normal size  CV: No edema  Respiratory: Unlabored breathing  Lymph: No axillary, submandibular, supraclavicular or inguinal lymphadenopathy  Abd: Nondistended  Skin: warm, perfused, no jaundice  Neuro: AAO x 3, No asterixis  Psych: Normal affect  MSK: Normal gait, currently with a neck collar    PERTINENT STUDIES:  reviewed and compared with the patient and the patient's

## 2017-10-03 NOTE — TELEPHONE ENCOUNTER
FIDELINA Saravia, gave verbal orders to refill meloxicam 7.5mg QD for pain to cover pt until Dr. Martinez returns to clinic the week of 10/9/17.  RN[facility] will follow up with Dr. Martinez on 10/10/17.

## 2017-10-04 ENCOUNTER — OFFICE VISIT (OUTPATIENT)
Dept: NEUROLOGY | Facility: CLINIC | Age: 47
End: 2017-10-04

## 2017-10-04 VITALS
SYSTOLIC BLOOD PRESSURE: 113 MMHG | BODY MASS INDEX: 31.42 KG/M2 | HEART RATE: 121 BPM | WEIGHT: 200.2 LBS | DIASTOLIC BLOOD PRESSURE: 76 MMHG | HEIGHT: 67 IN

## 2017-10-04 DIAGNOSIS — R20.8 SYNESTHESIA: ICD-10-CM

## 2017-10-04 DIAGNOSIS — M54.81 OCCIPITAL NEURITIS: ICD-10-CM

## 2017-10-04 DIAGNOSIS — K59.01 SLOW TRANSIT CONSTIPATION: ICD-10-CM

## 2017-10-04 DIAGNOSIS — M79.601 PAIN IN BOTH UPPER EXTREMITIES: ICD-10-CM

## 2017-10-04 DIAGNOSIS — M79.602 PAIN IN BOTH UPPER EXTREMITIES: ICD-10-CM

## 2017-10-04 DIAGNOSIS — M54.2 CERVICALGIA: Primary | ICD-10-CM

## 2017-10-04 DIAGNOSIS — H53.123 SCINTILLATING SCOTOMA OF BOTH EYES: ICD-10-CM

## 2017-10-04 DIAGNOSIS — G90.1 DYSAUTONOMIA (H): ICD-10-CM

## 2017-10-04 DIAGNOSIS — R55 VASOVAGAL SYNCOPE: ICD-10-CM

## 2017-10-04 RX ORDER — ZONISAMIDE 25 MG/1
CAPSULE ORAL
Qty: 70 CAPSULE | Refills: 3 | Status: SHIPPED | OUTPATIENT
Start: 2017-10-04 | End: 2017-12-09

## 2017-10-04 ASSESSMENT — PAIN SCALES - GENERAL: PAINLEVEL: SEVERE PAIN (6)

## 2017-10-04 NOTE — MR AVS SNAPSHOT
After Visit Summary   10/4/2017    Ada Welch    MRN: [id number]           Patient Information     Date Of Birth          1970        Visit Information        Provider Department      10/4/2017 8:00 AM Seth Galeano MD [facility] Neurology        Today's Diagnoses     Cervicalgia    -  1    Pain in both upper extremities        Vasovagal syncope        Slow transit constipation        Dysautonomia        Occipital neuritis           Follow-ups after your visit        Additional Services     NEUROLOGY ADULT REFERRAL       Your provider has referred you for the following:   [facility]  Please be aware that coverage of these services is subject to the terms and limitations of your health insurance plan.  Call member services at your health plan with any benefit or coverage questions.      Please bring the following with you to your appointment:    (1) Any X-Rays, CTs or MRIs which have been performed.  Contact the facility where they were done to arrange for  prior to your scheduled appointment.    (2) List of current medications  (3) This referral request   (4) Any documents/labs given to you for this referral            NEUROSURGERY REFERRAL       Your provider has referred you to: [facility]: Neurosurgery Clinic [facility] [phone]   [email or web address]    Please be aware that coverage of these services is subject to the terms and limitations of your health insurance plan.  Call member services at your health plan with any benefit or coverage questions.      Please bring the following with you to your appointment:    (1) Any X-Rays, CTs or MRIs which have been performed.  Contact the facility where they were done to arrange for  prior to your scheduled appointment.   (2) List of current medications  (3) This referral request   (4) Any documents/labs given to you for this referral            PHYSIATRY REFERRAL        Your provider has referred you to: [facility]: Physical Medicine and Rehabilitation Clinic - [place] [phone]   [email or web address]     Please be aware that coverage of these services is subject to the terms and limitations of your health insurance plan.  Call member services at your health plan with any benefit or coverage questions.      Please bring the following to your appointment:  >>   Any x-rays, CTs or MRIs which have been performed.  Contact the facility where they were done to arrange for  prior to your scheduled appointment.    >>   List of current medications   >>   This referral request   >>   Any documents/labs given to you for this referral                  Follow-up notes from your care team     Return in about 3 months (around 1/4/2018).      Your next 10 appointments already scheduled     Oct 04, 2017  1:00 PM CDT   (Arrive by 12:45 PM)   New Patient Visit with Juana Griffith MD   [facility] ([facility])    [address] [place] [address]  4th [facility] [id number] [phone]            Oct 05, 2017  3:30 PM CDT   (Arrive by 3:15 PM)   Implantable Loop Recorder with Uc Cv Device 1   [facility] ([facility] [place])    [address] [place] [address] [id number] [phone]            Oct 05, 2017  4:00 PM CDT   (Arrive by 3:45 PM)   NEW ARRHYTHMIA with Kirk Russell MD   [facility] ([facility])    [address] [place] [address] [id number] [phone]            Oct 06, 2017  9:00 AM CDT   XR VIDEO SPEECH EVALUATION WITH ESOPHAGRAM with UCXR2, UC GIGU RAD   [facility] [place] [facility] ([facility])    [address] [place] [address]  1st [facility] [address] [phone]           Please bring a list of your current medicines to your exam. (Include vitamins, minerals and over-the-counter  medicines.) Leave your valuables at home.  Tell the doctor if there is a chance you could be pregnant.  Do not eat for 4 hours before the exam. Keep drinking clear liquids until 2 hours before the exam.  You may take pain medicine (with a sip of water) up to 4 hours before the exam.  Do not swallow any other medicines unless your doctor tells you to. Talk to your doctor to be sure it s safe to stop your medicines.  Please call the Imaging Department at your exam site with any questions.            Oct 06, 2017  9:00 AM CDT   Video Swallow with JAYANT Shaw   [facility] ([facility])    [address] [place] [address]  4th [place] [address] [phone]           Please check in for this visit in Imaging on the 1st floor.            Oct 27, 2017  9:20 AM CDT   (Arrive by 9:05 AM)   Return Visit with Faiza Martinez MD   [facility] ([facility])    [address] [place] [address] [id number] [phone]            Nov 07, 2017  9:30 AM CST   (Arrive by 9:15 AM)   EMG with Jorge Pickard MD   [facility] ([facility])    [address] [place] [address]  3rd [facility] [id number] [phone]           Do not use lotions or creams on the area to be tested. If you are on blood thinners (Warfarin, Coumadin, Lovenox, etc), please contact your primary care physician to check if it is safe to stop them 3 days prior to testing. If you have anxiety, please check with your referring physician to obtain anti-anxiety medication for the procedure.            Nov 13, 2017 10:30 AM CST   (Arrive by 10:15 AM)   Return Visit with Ellyn Rivera MD   [facility] ([facility])    [address] [place] [address] [id number] [phone]            Nov 30, 2017  9:30 AM CST   (Arrive by 9:15 AM)   New Patient  "Visit with Bertha Moncada MD   [facility] ([facility])    [address] [facility]  3rd Floor  [facility] [address] [phone]              Future tests that were ordered for you today     Open Future Orders        Priority Expected Expires Ordered    EMG - Needle EMG 2 Extremities (12661) Routine  10/4/2018 10/4/2017            Who to contact     Please call your clinic at [phone] to:    Ask questions about your health    Make or cancel appointments    Discuss your medicines    Learn about your test results    Speak to your doctor   If you have compliments or concerns about an experience at your clinic, or if you wish to file a complaint, please contact [facility] Patient Relations at [phone] or email us at [email or web address]         Additional Information About Your Visit        MyChart Information     [employer] gives you secure access to your electronic health record. If you see a primary care provider, you can also send messages to your care team and make appointments. If you have questions, please call your primary care clinic.  If you do not have a primary care provider, please call [phone] and they will assist you.      [employer] is an electronic gateway that provides easy, online access to your medical records. With [employer], you can request a clinic appointment, read your test results, renew a prescription or communicate with your care team.     To access your existing account, please contact your [facility] or call [phone] for assistance.        Care EveryWhere ID     This is your Care EveryWhere ID. This could be used by other organizations to access your [facility] medical records  [id number]        Your Vitals Were     Pulse Height BMI (Body Mass Index)             121 1.702 m (5' 7\") 31.36 kg/m2          Blood Pressure from Last 3 Encounters:   10/04/17 113/76   10/02/17 " 130/79   09/28/17 95/60    Weight from Last 3 Encounters:   10/04/17 90.8 kg (200 lb 3.2 oz)   10/02/17 90.3 kg (199 lb)   09/28/17 90.9 kg (200 lb 6.4 oz)              We Performed the Following     NEUROLOGY ADULT REFERRAL     NEUROSURGERY REFERRAL     PHYSIATRY REFERRAL          Today's Medication Changes          These changes are accurate as of: 10/4/17  9:37 AM.  If you have any questions, ask your nurse or doctor.               These medicines have changed or have updated prescriptions.        Dose/Directions    * zonisamide 25 MG capsule   Commonly known as:  Zonegran   This may have changed:  Another medication with the same name was added. Make sure you understand how and when to take each.   Used for:  Chronic daily headache   Changed by:  Fara Varner APRN CNP        Take 25 mg daily for 3 days then 25 mg twice daily   Quantity:  60 capsule   Refills:  3       * zonisamide 25 MG capsule   Commonly known as:  Zonegran   This may have changed:  You were already taking a medication with the same name, and this prescription was added. Make sure you understand how and when to take each.   Used for:  Occipital neuritis   Changed by:  Seth Galeano MD        Take 1 tablet (25 mg) once daily for 1 week, then 2 tablets once daily for 1 week, then 3 tablets once daily for 1 week, then 4 tablets once daily for 1 week.   Quantity:  70 capsule   Refills:  3       * Notice:  This list has 2 medication(s) that are the same as other medications prescribed for you. Read the directions carefully, and ask your doctor or other care provider to review them with you.         Where to get your medicines      Some of these will need a paper prescription and others can be bought over the counter.  Ask your nurse if you have questions.     Bring a paper prescription for each of these medications     zonisamide 25 MG capsule                Primary Care Provider Office Phone # Fax #    Ellyn Walker  MD Nicole [phone]       [address] [place] [address] [phone]  [facility] [address]        Equal Access to Services     CHRISTINA GUY : Hadchantel aad ku hadkokochela Poppynegrito, venuda phyliciaroxannaha, evitata kabessyda eileen, odette wallacejuanjose tang. So [facility] [phone].    ATENCIÓN: Si habla español, tiene a sweeney disposición servicios gratuitos de asistencia lingüística. Llame al [phone].    We comply with applicable federal civil rights laws and [place] laws. We do not discriminate on the basis of race, color, national origin, age, disability, sex, sexual orientation, or gender identity.            Thank you!     Thank you for choosing [facility]  for your care. Our goal is always to provide you with excellent care. Hearing back from our patients is one way we can continue to improve our services. Please take a few minutes to complete the written survey that you may receive in the mail after your visit with us. Thank you!             Your Updated Medication List - Protect others around you: Learn how to safely use, store and throw away your medicines at www.disposemymeds.org.          This list is accurate as of: 10/4/17  9:37 AM.  Always use your most recent med list.                   Brand Name Dispense Instructions for use Diagnosis    ADVAIR DISKUS 250-50 MCG/DOSE diskus inhaler   Generic drug:  fluticasone-salmeterol      Inhale 1 puff into the lungs 2 times daily as needed        albuterol 108 (90 BASE) MCG/ACT Inhaler    PROAIR HFA/PROVENTIL HFA/VENTOLIN HFA     Inhale 2 puffs into the lungs        amitriptyline 10 MG tablet    ELAVIL    90 tablet    Take 3 tablets (30 mg) by mouth At Bedtime    Chronic migraine without aura without status migrainosus, not intractable       buprenorphine 10 MCG/HR WK patch    BUTRANS    4 patch    Place 1 patch onto the skin every 7 days    Postlaminectomy syndrome of lumbar region       cetirizine 10 MG tablet    zyrTEC     Take 10 mg by mouth daily         COMPRESSION STOCKINGS     3 each    1 each daily 20-30 mmHg Thigh Length measure and fit, color and style per patient preference. Antolin garcia per need.    Orthostatic hypotension       eletriptan 20 MG tablet    RELPAX    18 tablet    Take 1 tablet (20 mg) by mouth at onset of headache for migraine May repeat in 2 hours. Max 4 tablets/24 hours.    Chronic daily headache, Intractable migraine without aura and with status migrainosus       EPINEPHrine 0.3 MG/0.3ML injection 2-pack    EPIPEN/ADRENACLICK/or ANY BX GENERIC EQUIV     Inject 0.3 mg into the muscle        etodolac 400 MG tablet    LODINE     Take 400 mg by mouth        famotidine 20 MG tablet    PEPCID    90 tablet    Take 1 tablet (20 mg) by mouth At Bedtime    Hiatal hernia, Mast cell disorder       fluticasone 50 MCG/ACT spray    FLONASE     2 sprays        ketamine (KETALAR) 5%-gabapentin (NEURONTIN) 8%-lidocaine 2.5% 5%-8% Gel topical PLO cream     30 g    Apply 1 g topically 3 times daily as needed    Postlaminectomy syndrome of lumbosacral region, Postlaminectomy syndrome of lumbar region       meloxicam 7.5 MG tablet    MOBIC    7 tablet    Take 1 tablet (7.5 mg) by mouth daily    Chronic migraine without aura without status migrainosus, not intractable       metoclopramide 5 MG tablet    REGLAN    20 tablet    Take 1 tablet (5 mg) by mouth every 6 hours as needed    Intractable chronic migraine without aura and with status migrainosus       MULTIVITAMIN PO      Take by mouth daily        omeprazole 20 MG CR capsule    priLOSEC    90 capsule    Take 1 capsule (20 mg) by mouth daily    Hiatal hernia       ondansetron 4 MG ODT tab    ZOFRAN-ODT    60 tablet    Take 1-2 tablets (4-8 mg) by mouth every 12 hours as needed for nausea    Migraine without status migrainosus, not intractable, unspecified migraine type       polyethylene glycol powder    MIRALAX/GLYCOLAX          PROBIOTIC DAILY PO      Take by mouth 2 times daily        SUMAtriptan  6 MG/0.5ML pen injector kit    IMITREX STATDOSE    2 kit    Inject 0.5 mLs (6 mg) Subcutaneous at onset of headache for migraine (may repeat once after 2 hrs) May repeat in 1 hour. Max 12 mg/24 hours.    Chronic migraine without aura without status migrainosus, not intractable       SYNTHROID 25 MCG tablet   Generic drug:  levothyroxine      Take 25 mcg by mouth daily        tiZANidine 4 MG tablet    ZANAFLEX    90 tablet    Take 2 tablets (8 mg) by mouth 3 times daily as needed for muscle spasms    Post laminectomy syndrome       traMADol 50 MG tablet    ULTRAM    15 tablet    Take 1 tablet (50 mg) by mouth every 6 hours as needed for pain maximum 4 tablet(s) per day    Migraine with aura and without status migrainosus, not intractable       * zonisamide 25 MG capsule    Zonegran    60 capsule    Take 25 mg daily for 3 days then 25 mg twice daily    Chronic daily headache       * zonisamide 25 MG capsule    Zonegran    70 capsule    Take 1 tablet (25 mg) once daily for 1 week, then 2 tablets once daily for 1 week, then 3 tablets once daily for 1 week, then 4 tablets once daily for 1 week.    Occipital neuritis       * Notice:  This list has 2 medication(s) that are the same as other medications prescribed for you. Read the directions carefully, and ask your doctor or other care provider to review them with you.

## 2017-10-04 NOTE — LETTER
10/4/2017       RE: Ada Welch  [address] [place] [address]     Dear Colleague,    Thank you for referring your patient, Ada Welch, to the [facility] at [facility]. Please see a copy of my visit note below.    2017      Ellyn Rivera MD   [facility]    [address]   [place] [address]      RE: Ada Wlech   MRN: [id number]   : 1970      Dear Dr. Rivera:      Your patient, Ada Welch, requested comprehensive neurologic consultation today on 10/04/2017.  Her chief complaint is that of chronic headache, neck pain and fatigue.      The patient has had chronic problems for years.  She said that she finally did see a specialist away from the [facility].  That specialist said that she had gastric paresis, disequilibrium, cervical instability, mast cell disorder, Chiari malformation, TMJ, and hearing loss.  Despite that no tests were done and no records are forthcoming.  I stressed the importance of the patient to have those.  It was apparent that the patient already sought neurologic consultation earlier in our department.  She did tell me that she is applying for disability.  She went on to state that she has been rejected 5 times from being seen in [place] at the [facility].  She described having vertigo.  This started in July.  She also said that she has had low blood pressure and she is seeing Dr. Russell for it.  She has not suffered any hearing loss.  She has noted intermittent ringing and buzzing in her ears for years.  The patient has had chronic nausea for the last 5 years.  This is worse.  She has had occasional constipation.  She said it hurts for her to eat.  Despite that she has gained weight.  She now weighs 200 pounds and is 5 feet 7 inches tall.  The patient did tell me that she has had chronic spine pain and she did have 4 epidural injections which actually made her worse.  She  has been using gabapentin and Lyrica on separate occasions and did gain weight and did not have benefit from the medications.  The patient more recently has been on zonisamide.  She did tell me that she has had daily headaches that started in 1999 and escalated in 2009.  She had them for a full month.  She said she was treated with DHE through a neurologist in [place].  She then was much better and started to suffer headaches again in April.  She was admitted to the [facility] and she did not tolerate DHE.  The patient was told that she could not use it again because of potential risk of it.  She continues to have paresthesias involving her face.  She has noted that she has had twitchiness involving the right side of her mouth at times.  She notes a hot feeling involving the base of her skull.  She said that she has had pain in the mastoid areas that radiate into the biparietal area then into the frontal area where she has pressure.  She has had postnasal discharge for at least a year.  The patient has had nasal surgery which helped by Dr. Zoe Barahona.  The patient did have 3 episodes of syncope in July.  She noted dizziness starting then when she would turn her head to either side.  She said that she basically has pain involving her spine for 24 hours.  She has found that a down pillow that is flat may work better than other pillows but really does not give her any significant relief.  She has tried trigger point injections without any real benefit.  She has noted pain that has traveled from her neck into both arms.  The patient has had further evaluations here including sleep consult.  She was just recently seen on 09/28/2017.  The patient has not been able to work because of her issues.  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  She currently has been spending a lot of time in [employer] support groups.  She does snore at times.  The patient has had  difficulty breathing through her nose.  Her neurologist who did tell her she had multiple problems was recorded as Dr. Mukul Sofia in [facility], but again those records were not available to practitioners here.She described visual blurring and also discussed colored vision and her ability for synesthesia and also electrokinesis.        ALLERGIES:   The patient has had allergies to bee venom, chicken derived products, gabapentin, gluten, lactulose, milk protein, topiramate, wheat and wheat brand.      CURRENT MEDICATIONS:   Her medicines now include:   1.  Synthroid.     2.  Mobic.    3.  Elavil 30 mg at bedtime.     4.  Pepcid.     5.  Prilosec.   6.  p.r.n. Zofran.   7.  p.r.n. Reglan 5 mg.     8.  Ultram 50 mg up to every 6 hours for pain.     9.  Maxalt 5 mg OTD tabs.   10.  Tizanidine 8 mg by mouth t.i.d. for muscle spasms.     11.  Butrans patch.   12.  Carrying an EpiPen.     13.  Albuterol.     14.  Flonase.     15.  Lodine.    16.  Sumatriptan stat dose 6 mg with a pen ejector kit.     17.  Topical ketamine-gabapentin-lidocaine with 8% gel topical PLO cream.     18.  Advair.     19.  Zyrtec.     20.  MiraLax.     21.  Multivitamin.    22.  Probiotic.        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  Her other diagnoses include autoimmune disease with immunosuppression, easy bruising, chronic sinusitis, chronic tonsillitis with prior history of rheumatic fever and tonsillectomies being removed in 1981, GERD with a hiatal hernia, hoarseness which comes and goes, nasal polyps, and prior history of pneumonia.  She also has had prior history of lymph node swelling and she was diagnosed with thyroid disease in 2008.  She has had adenoidectomy, repair of nasal septum, nasal-sinus polypectomy,  tonsillectomy and tubal ligation.  She drinks very little.  She does not use any illicit drugs.  The patient quit smoking in 2013.  She has a strong family history of Tom-Danlos syndrome with connective tissue disease issues.      I reviewed the records of Dr. Fara Varner, APRN-CNP in the Department of Neurology.  The patient in Dr. Varner's records do indicate that she had been seeing Dr. Martinez and she reported Botox was not an option.  She noted physical therapy had made her headaches worse.  She did note that rizatriptan would help for a short time.  Medrol Dosepak possibly had given her some relief.  Her intolerance to DHE was reduction in her blood pressure.  The patient did note that ketorolac helped for a short time.  She did end up having MRA and MRV study that was normal.  This was read by Dr. Miller.        The patient did review with me her history of Tom-Danlos and being diagnosed with type 3.      The patient did review with me her past history that was recorded through [facility] emergency room before she was admitted and she had received many medicines while in the ED including Depakote.  She did go on to tell me she had tried this many years ago but did not recall if it helped, Toradol, gabapentin, Dilaudid, Decadron, Benadryl, Reglan, Phenergan and magnesium.  Despite this, her headaches have continued to bother her.  She had stated before she was admitted that DHE seemed to take the edge off for several hours but pain would recur.  She had been given multiple doses in the emergency room before she was admitted.  She had multiple doses of diphenhydramine, Toradol, magnesium, Reglan, Zofran, Phenergan, tizanidine and 1 dose of Decadron along with the DHE protocol.  When the patient was admitted on 07/24, she was discharged by 07/27 and she was told that she did not tolerate the DHE because of hypotension.        The patient also had a normal MRI of the brain on 07/24/2017.      The patient did  have an MRI scan of the cervical spine on 07/24/2017 and it was compared to an MRI scan from 02/17/2016.  She had no abnormal enhancement of the spinal cord, thecal sac or cervical vertebrae.  She had unchanged mild to moderate degenerative changes, most prominent at C4 through C7, and moderate right neural foraminal narrowing at C5-C6.  I reviewed these scans with her.      The patient did tell me that she had been in a wheelchair last fall but had improved to crutches and now was able walk without them.  She did complain about her chronic lower back pain.  She went on to tell me that she does have a sleep study pending.      Neurologic examination revealed a woman who did not appear to be in distress.  Gait, station, cerebellar testing, muscle stretch reflexes which were present all to reinforcement, symmetrically, plantar stimulation, strength testing, cranial nerve examination except for decreased sensation to pinprick involving the midline upper cervical spine below the skull base, as well as decreased sensation to pinprick involving the left foot, are otherwise unremarkable.  She was tender involving the left occipital notch.  She had mild reduction in extension and lateral rotation of her neck.  I could not auscultate cervical bruits.  She had a regular cardiac rhythm without gallops or murmurs.      IMPRESSION:     1.  Chronic migraine.   2.  Cervical spondylosis.   3.  Chronic neck and arm pain.   4.  Chronic low back pain.   5.  History of Tom-Danlos syndrome.   6.  Incomplete medical records.      I explained to the patient I could not really comment on her diagnosis made by a neurologist in the past in an outside institution.  I do not have those records and strongly urged that she get those.  She does have other outside records pending, too.  I told her the importance of having these available to us.  I did discuss with her not driving because of her syncopal episodes.  The patient does need to  review this with Dr. Russell, who she is going to be seeing tomorrow.  I did talk with her about having EMG testing of the arms.  The patient has noted decreased ability to grasp with decreased strength.  I did talk with her about gradually increase zonisamide.  I went over potential side effects with her and the need for hydration and to watch for skin rash and renal lithiasis as well as irritability.I also recommended neurosurgery consultation and formal neuro opthalmology exam.      I have asked that the patient have neurologic followup with me.      I did go over with the patient issues regarding analgesic rebound and I did talk with her about importance of day to day activities which may be helpful in terms of treating chronic migraine.      The patient did have complement me and said that I had spent more time with her and done a better examination than the [place] neurologist [place] done.        Sincerely yours,      Bernadette Galeano MD       I spent 75 minutes with the patient today.  Over 50% of the time this involved counseling and coordination of care.  A complete review of medical systems was done and a positive review is listed in the report above.         BERNADETTE GALEANO MD             D: 10/30/2017 12:08   T: 10/30/2017 14:11   MT: ADAMS      Name:     PANCHO HENAO   MRN:      [id number]        Account:      [id number]   :      1970           Service Date: 10/04/2017      Document: [id number]        Again, thank you for allowing me to participate in the care of your patient.      Sincerely,    Bernadette Galeano MD

## 2017-10-04 NOTE — NURSING NOTE
Chief Complaint   Patient presents with     Consult     [facility] NEW - CONSULT     Neelima Covarrubias MA

## 2017-10-05 ENCOUNTER — ALLIED HEALTH/NURSE VISIT (OUTPATIENT)
Dept: CARDIOLOGY | Facility: CLINIC | Age: 47
End: 2017-10-05
Attending: INTERNAL MEDICINE
Payer: COMMERCIAL

## 2017-10-05 ENCOUNTER — PRE VISIT (OUTPATIENT)
Dept: CARDIOLOGY | Facility: CLINIC | Age: 47
End: 2017-10-05

## 2017-10-05 VITALS
BODY MASS INDEX: 31.45 KG/M2 | WEIGHT: 200.4 LBS | HEART RATE: 139 BPM | OXYGEN SATURATION: 97 % | DIASTOLIC BLOOD PRESSURE: 93 MMHG | HEIGHT: 67 IN | SYSTOLIC BLOOD PRESSURE: 137 MMHG

## 2017-10-05 DIAGNOSIS — Q79.60 EDS (EHLERS-DANLOS SYNDROME): Primary | ICD-10-CM

## 2017-10-05 DIAGNOSIS — G90.A POTS (POSTURAL ORTHOSTATIC TACHYCARDIA SYNDROME): ICD-10-CM

## 2017-10-05 DIAGNOSIS — Q79.60 EDS (EHLERS-DANLOS SYNDROME): ICD-10-CM

## 2017-10-05 DIAGNOSIS — Q79.60 EHLERS-DANLOS SYNDROME: Primary | ICD-10-CM

## 2017-10-05 LAB
CRP SERPL-MCNC: <2.9 MG/L (ref 0–8)
ERYTHROCYTE [SEDIMENTATION RATE] IN BLOOD BY WESTERGREN METHOD: 8 MM/H (ref 0–20)
TSH SERPL DL<=0.005 MIU/L-ACNC: 2.08 MU/L (ref 0.4–4)

## 2017-10-05 PROCEDURE — 83835 ASSAY OF METANEPHRINES: CPT | Performed by: INTERNAL MEDICINE

## 2017-10-05 PROCEDURE — 99213 OFFICE O/P EST LOW 20 MIN: CPT | Mod: 25,ZF

## 2017-10-05 PROCEDURE — 93228 REMOTE 30 DAY ECG REV/REPORT: CPT | Performed by: INTERNAL MEDICINE

## 2017-10-05 PROCEDURE — 36415 COLL VENOUS BLD VENIPUNCTURE: CPT | Performed by: INTERNAL MEDICINE

## 2017-10-05 PROCEDURE — 99203 OFFICE O/P NEW LOW 30 MIN: CPT | Mod: ZP | Performed by: INTERNAL MEDICINE

## 2017-10-05 PROCEDURE — 84443 ASSAY THYROID STIM HORMONE: CPT | Performed by: INTERNAL MEDICINE

## 2017-10-05 PROCEDURE — 93291 INTERROG DEV EVAL SCRMS IP: CPT | Mod: 26 | Performed by: INTERNAL MEDICINE

## 2017-10-05 PROCEDURE — 99213 OFFICE O/P EST LOW 20 MIN: CPT

## 2017-10-05 PROCEDURE — 85652 RBC SED RATE AUTOMATED: CPT | Performed by: INTERNAL MEDICINE

## 2017-10-05 PROCEDURE — 93291 INTERROG DEV EVAL SCRMS IP: CPT | Mod: ZF

## 2017-10-05 PROCEDURE — 86140 C-REACTIVE PROTEIN: CPT | Performed by: INTERNAL MEDICINE

## 2017-10-05 ASSESSMENT — PAIN SCALES - GENERAL: PAINLEVEL: NO PAIN (0)

## 2017-10-05 NOTE — PATIENT INSTRUCTIONS
You will be scheduled for a tilt/autonomic study with Dr Russell. Arlyn will contact you to schedule this.   Her number is [phone]     Testing Scheduled: 7 day heart monitor today and labs.    A follow up visit will be scheduled with Dr Russell's NP: Pia Agarwal NP in about 4 months.     We encourage you to use My Chart as your primary form of communication if possible. If you need assistance in setting this up, please contact our office or ask at your follow up visit.     If you need a medication refill please contact your pharmacy. Please allow at least 3 business days for your refill to be completed.       Cardiology  Telephone Number    Ginny Bernabe -[phone]   Or send a message to your provider via my chart.   For scheduling procedures:    Arlyn Amezcua    Clinic appointments       [phone]   For the Device Clinic (Pacemakers and ICD's)   RN's :   Carla Wade  During business hours: [phone]    After business hours:   [phone]- select option 4 and ask for job code [id number].          As always, Thank you for trusting us with your health care needs!

## 2017-10-05 NOTE — NURSING NOTE
Chief Complaint   Patient presents with     New Patient     ortho b/p, ILR check-implanted 2015-Alfa Hinojosa/Niko: echo 2016- hx tachycardia, EDS     Vitals were taken and medications were reconciled.     Dunia Zavala MA    4:47 PM      Per Kirk Conde patient to have 7 days cardionet monitor placed.  Diagnosis: EDS  Monitor placed: Yes  Patient Instructed: Yes  Patient verbalized understanding: Yes  Kit ID: [id number]  [id number]

## 2017-10-05 NOTE — MR AVS SNAPSHOT
After Visit Summary   10/5/2017    Ada Welch    MRN: [id number]           Patient Information     Date Of Birth          1970        Visit Information        Provider Department      10/5/2017 3:30 PM 1, Jorge L Cv Device [facility]        Today's Diagnoses     Tom-Danlos syndrome    -  1      Care Instructions    It was a pleasure to see you in clinic today. Please do not hesitate to call with any questions or concerns. We look forward to seeing you in clinic at your next device check in 3 months.    Carla Hou, RN  Electrophysiology Nurse Clinician  [facility]  During business hours call:  [phone]  After business hours please call: [phone]- select option #4 and ask for job code [id number].            Follow-ups after your visit        Follow-up notes from your care team     Return in about 3 months (around 1/5/2018) for [employer], ILR check.      Your next 10 appointments already scheduled     Oct 05, 2017  4:00 PM CDT   (Arrive by 3:45 PM)   NEW ARRHYTHMIA with Kirk Russell MD   [facility] ([facility] [place])    [address] [place] [address] [phone]            Oct 06, 2017  9:00 AM CDT   XR VIDEO SPEECH EVALUATION WITH ESOPHAGRAM with JORGE LXR2, JORGE L GIGU RAD   [facility] [place] [facility] ([facility] [place])    [address] [place] [id number] [phone]           Please bring a list of your current medicines to your exam. (Include vitamins, minerals and over-the-counter medicines.) Leave your valuables at home.  Tell the doctor if there is a chance you could be pregnant.  Do not eat for 4 hours before the exam. Keep drinking clear liquids until 2 hours before the exam.  You may take pain medicine (with a sip of water) up to 4 hours before the exam.  Do not swallow any other medicines unless your doctor tells you to. Talk to your  doctor to be sure it s safe to stop your medicines.  Please call the Imaging Department at your exam site with any questions.            Oct 06, 2017  9:00 AM CDT   Video Swallow with JAYANT Shaw   [facility] ([facility])    [address] [place] [address] [phone]           Please check in for this visit in Imaging on the 1st floor.            Oct 27, 2017  9:20 AM CDT   (Arrive by 9:05 AM)   Return Visit with Faiza Martinez MD   [facility] ([facility])    [address] [place], [place] [address] [phone]            Oct 30, 2017 10:00 AM CDT   (Arrive by 9:45 AM)   New Patient Visit with Jay Guajardo MD   [facility] ([facility])    [address] [place], [place] [id number] [phone]            Nov 07, 2017  9:30 AM CST   (Arrive by 9:15 AM)   EMG with Jorge Pickard MD   [facility] EMG ([facility])    [address] [place], [place] [id number] [phone]           Do not use lotions or creams on the area to be tested. If you are on blood thinners (Warfarin, Coumadin, Lovenox, etc), please contact your primary care physician to check if it is safe to stop them 3 days prior to testing. If you have anxiety, please check with your referring physician to obtain anti-anxiety medication for the procedure.            Nov 13, 2017 10:30 AM CST   (Arrive by 10:15 AM)   Return Visit with Ellyn Rivera MD   [facility] ([facility])    [address] [place] [id number] [phone]            Nov 30, 2017  9:30 AM CST   (Arrive by 9:15 AM)   New Patient Visit with Bertha Moncada MD   [facility] Neurosurgery ([facility])    [address] [place] [address]  [address]  3rd Floor  [facility] [address]   [phone]            Dec 06, 2017 11:15 AM CST   (Arrive by 11:00 AM)   Return Visit with Wade Singh MD   [facility] Prostate [facility] Urologic Cancers ([facility])    [address] [facility]  4th Floor  [facility] [address] [phone]              Future tests that were ordered for you today     Open Future Orders        Priority Expected Expires Ordered    EMG - Needle EMG 2 Extremities (32963) Routine  10/4/2018 10/4/2017            Who to contact     If you have questions or need follow up information about today's clinic visit or your schedule please contact [facility] directly at [phone].  Normal or non-critical lab and imaging results will be communicated to you by MyChart, letter or phone within 4 business days after the clinic has received the results. If you do not hear from us within 7 days, please contact the clinic through [employer] or phone. If you have a critical or abnormal lab result, we will notify you by phone as soon as possible.  Submit refill requests through [employer] or call your pharmacy and they will forward the refill request to us. Please allow 3 business days for your refill to be completed.          Additional Information About Your Visit        [employer] Information     [employer] gives you secure access to your electronic health record. If you see a primary care provider, you can also send messages to your care team and make appointments. If you have questions, please call your primary care clinic.  If you do not have a primary care provider, please call [phone] and they will assist you.        Care EveryWhere ID     This is your Care EveryWhere ID. This could be used by other organizations to access your [place] medical records  [id number]         Blood Pressure from Last 3 Encounters:   10/04/17 113/76   10/02/17 130/79   09/28/17 95/60    Weight from Last 3  Encounters:   10/04/17 90.8 kg (200 lb 3.2 oz)   10/02/17 90.3 kg (199 lb)   09/28/17 90.9 kg (200 lb 6.4 oz)              We Performed the Following     INTERR DEVICE EVAL IN PERSON, LOOP RECORDER        Primary Care Provider Office Phone # Fax #    HubbardDenise Rivera [phone]       [address] [place] [phone]  [facility] [id number]        Equal Access to Services     CHRISTINA GUY : Hadii aad ku hadasho Soomaali, waaxda luqadaha, qaybta kaalmada adeegyada, waxay idiin hayaan adeeg kharash laeva tang. So [facility] [phone].    ATENCIÓN: Si habla español, tiene a sweeney disposición servicios gratuitos de asistencia lingüística. [facility] [phone].    We comply with applicable federal civil rights laws and [place] laws. We do not discriminate on the basis of race, color, national origin, age, disability, sex, sexual orientation, or gender identity.            Thank you!     Thank you for choosing [facility]  for your care. Our goal is always to provide you with excellent care. Hearing back from our patients is one way we can continue to improve our services. Please take a few minutes to complete the written survey that you may receive in the mail after your visit with us. Thank you!             Your Updated Medication List - Protect others around you: Learn how to safely use, store and throw away your medicines at www.disposemymeds.org.          This list is accurate as of: 10/5/17  3:38 PM.  Always use your most recent med list.                   Brand Name Dispense Instructions for use Diagnosis    ADVAIR DISKUS 250-50 MCG/DOSE diskus inhaler   Generic drug:  fluticasone-salmeterol      Inhale 1 puff into the lungs 2 times daily as needed        albuterol 108 (90 BASE) MCG/ACT Inhaler    PROAIR HFA/PROVENTIL HFA/VENTOLIN HFA     Inhale 2 puffs into the lungs        amitriptyline 10 MG tablet    ELAVIL    90 tablet    Take 3 tablets (30 mg) by mouth At Bedtime    Chronic migraine without aura  without status migrainosus, not intractable       buprenorphine 10 MCG/HR WK patch    BUTRANS    4 patch    Place 1 patch onto the skin every 7 days    Postlaminectomy syndrome of lumbar region       cetirizine 10 MG tablet    zyrTEC     Take 10 mg by mouth daily        COMPRESSION STOCKINGS     3 each    1 each daily 20-30 mmHg Thigh Length measure and fit, color and style per patient preference. Doff n jose per need.    Orthostatic hypotension       eletriptan 20 MG tablet    RELPAX    18 tablet    Take 1 tablet (20 mg) by mouth at onset of headache for migraine May repeat in 2 hours. Max 4 tablets/24 hours.    Chronic daily headache, Intractable migraine without aura and with status migrainosus       EPINEPHrine 0.3 MG/0.3ML injection 2-pack    EPIPEN/ADRENACLICK/or ANY BX GENERIC EQUIV     Inject 0.3 mg into the muscle        etodolac 400 MG tablet    LODINE     Take 400 mg by mouth        famotidine 20 MG tablet    PEPCID    90 tablet    Take 1 tablet (20 mg) by mouth At Bedtime    Hiatal hernia, Mast cell disorder       fluticasone 50 MCG/ACT spray    FLONASE     2 sprays        ketamine (KETALAR) 5%-gabapentin (NEURONTIN) 8%-lidocaine 2.5% 5%-8% Gel topical PLO cream     30 g    Apply 1 g topically 3 times daily as needed    Postlaminectomy syndrome of lumbosacral region, Postlaminectomy syndrome of lumbar region       meloxicam 7.5 MG tablet    MOBIC    7 tablet    Take 1 tablet (7.5 mg) by mouth daily    Chronic migraine without aura without status migrainosus, not intractable       metoclopramide 5 MG tablet    REGLAN    20 tablet    Take 1 tablet (5 mg) by mouth every 6 hours as needed    Intractable chronic migraine without aura and with status migrainosus       MULTIVITAMIN PO      Take by mouth daily        omeprazole 20 MG CR capsule    priLOSEC    90 capsule    Take 1 capsule (20 mg) by mouth daily    Hiatal hernia       ondansetron 4 MG ODT tab    ZOFRAN-ODT    60 tablet    Take 1-2 tablets (4-8  mg) by mouth every 12 hours as needed for nausea    Migraine without status migrainosus, not intractable, unspecified migraine type       polyethylene glycol powder    MIRALAX/GLYCOLAX     1 capful Take 1 capful by mouth daily        PROBIOTIC DAILY PO      Take by mouth 2 times daily 2 packets daily        SUMAtriptan 6 MG/0.5ML pen injector kit    IMITREX STATDOSE    2 kit    Inject 0.5 mLs (6 mg) Subcutaneous at onset of headache for migraine (may repeat once after 2 hrs) May repeat in 1 hour. Max 12 mg/24 hours.    Chronic migraine without aura without status migrainosus, not intractable       SYNTHROID 25 MCG tablet   Generic drug:  levothyroxine      Take 25 mcg by mouth daily        tiZANidine 4 MG tablet    ZANAFLEX    90 tablet    Take 2 tablets (8 mg) by mouth 3 times daily as needed for muscle spasms    Post laminectomy syndrome       traMADol 50 MG tablet    ULTRAM    15 tablet    Take 1 tablet (50 mg) by mouth every 6 hours as needed for pain maximum 4 tablet(s) per day    Migraine with aura and without status migrainosus, not intractable       zonisamide 25 MG capsule    Zonegran    70 capsule    Take 1 tablet (25 mg) once daily for 1 week, then 2 tablets once daily for 1 week, then 3 tablets once daily for 1 week, then 4 tablets once daily for 1 week.    Occipital neuritis

## 2017-10-05 NOTE — PATIENT INSTRUCTIONS
It was a pleasure to see you in clinic today. Please do not hesitate to call with any questions or concerns. We look forward to seeing you in clinic at your next device check in 3 months.    Carla Hou RN  Electrophysiology Nurse Clinician  [facility]  During business hours call:  [phone]  After business hours please call: [phone]- select option #4 and ask for job code [id number].

## 2017-10-05 NOTE — PROGRESS NOTES
HPI: 46 year old with a PMHx of Tom-Danlos, Chiari malformation, eosinophilic esophagitis, gastroparesis who was recently diagnosed with dysautonomia.  She is referred by Dr Tello.     Patient used to run multiple miles/week around  without any symptoms. During that time, she noted that her HR would be around 40-50. Given her EDS, she underwent spinal surgery and over the past 3 years her heart rates have fluctuated tremendously. Her rates could range from as low as 40 to as high as 150. There are no factors that cause her HR's to fluctuate. This could happen when she is supine, sitting, standing, walking, etc. In addition to fluctuating HR, she noted that her BP would also fluctuate at the same time. She would have symptoms of LH and dizziness with both low and high heart rates.   In addition, she had two episodes of fainting. Patient had a prodrome of LH, dizziness, nausea, diaphoresis, black spots in vision before the episodes. She would black out for a few seconds and was noted to be pale by her .     She saw Dr. Tello with these complaints and patient had a ILR placed. ILR showed poor R wave sensing but did show tachycardia with rates in the 150's. In addition, she underwent tilt table testing at [facility]; however, the results were unclear.     Patient's Orthostatic Vitals today showed:   Lyin/93    Sitting 133/98    Standing 130/95    Standing (1 Minute) 133/97    Standing (3 Minute) 137/93      Of note, patient has a history of allergic reactions. She states she often has skin rashes that sometimes improve with anti-histamines.       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      Chronic sinusitis 00/2007    Diagnosed with chronic pansinusitis 2016     Chronic tonsillitis 1980    Had tonsils removed 02/1981, rheumatic fever     Gastroesophageal reflux disease 3/1/2017    I have large hiatal hernia     Hernia, abdominal 10/2016    Hiatal Hernia     Hiatal hernia 2016    Have adeline hiatel hernia     Hoarseness Unsure    It comes and goes     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 70 capsule 3     buprenorphine (BUTRANS) 10 MCG/HR WK patch Place 1 patch onto the skin every 7 days 4 patch 0     meloxicam (MOBIC) 7.5 MG tablet Take 1 tablet (7.5 mg) by mouth daily 7 tablet 0     eletriptan (RELPAX) 20 MG tablet Take 1 tablet (20 mg) by mouth at onset of headache for migraine May repeat in 2 hours. Max 4 tablets/24 hours. 18 tablet 1     levothyroxine (SYNTHROID) 25 MCG tablet Take 25 mcg by mouth daily       amitriptyline (ELAVIL) 10 MG tablet Take 3 tablets (30 mg) by mouth At Bedtime 90 tablet 2     famotidine (PEPCID) 20 MG tablet Take 1 tablet (20 mg) by mouth At Bedtime 90 tablet 1     omeprazole (PRILOSEC) 20 MG CR capsule Take 1 capsule (20 mg) by mouth daily 90 capsule 1     ondansetron (ZOFRAN-ODT) 4 MG ODT tab Take 1-2 tablets (4-8 mg) by mouth every 12 hours as needed for nausea 60 tablet 1     metoclopramide (REGLAN) 5 MG tablet Take 1 tablet (5 mg) by mouth every 6 hours as needed 20  tablet 1     tiZANidine (ZANAFLEX) 4 MG tablet Take 2 tablets (8 mg) by mouth 3 times daily as needed for muscle spasms 90 tablet 3     EPINEPHrine (EPIPEN/ADRENACLICK/OR ANY BX GENERIC EQUIV) 0.3 MG/0.3ML injection 2-pack Inject 0.3 mg into the muscle       albuterol (PROAIR HFA/PROVENTIL HFA/VENTOLIN HFA) 108 (90 BASE) MCG/ACT Inhaler Inhale 2 puffs into the lungs       fluticasone (FLONASE) 50 MCG/ACT spray 2 sprays       COMPRESSION STOCKINGS 1 each daily 20-30 mmHg Thigh Length measure and fit, color and style per patient preference. Doff n jose per need. 3 each 3     SUMAtriptan (IMITREX STATDOSE) 6 MG/0.5ML pen injector kit Inject 0.5 mLs (6 mg) Subcutaneous at onset of headache for migraine (may repeat once after 2 hrs) May repeat in 1 hour. Max 12 mg/24 hours. 2 kit 1     ketamine, KETALAR, 5%-gabapentin, NEURONTIN, 8%-lidocaine 2.5% 5%-8% GEL topical PLO cream Apply 1 g topically 3 times daily as needed 30 g 3     fluticasone-salmeterol (ADVAIR DISKUS) 250-50 MCG/DOSE diskus inhaler Inhale 1 puff into the lungs 2 times daily as needed        cetirizine (ZYRTEC) 10 MG tablet Take 10 mg by mouth daily        polyethylene glycol (MIRALAX/GLYCOLAX) powder 1 capful Take 1 capful by mouth daily       Multiple Vitamins-Minerals (MULTIVITAMIN PO) Take by mouth daily       Probiotic Product (PROBIOTIC DAILY PO) Take by mouth 2 times daily 2 packets daily       [DISCONTINUED] zonisamide (ZONEGRAN) 25 MG capsule Take 25 mg daily for 3 days then 25 mg twice daily 60 capsule 3     traMADol (ULTRAM) 50 MG tablet Take 1 tablet (50 mg) by mouth every 6 hours as needed for pain maximum 4 tablet(s) per day (Patient not taking: Reported on 10/5/2017) 15 tablet 0     etodolac (LODINE) 400 MG tablet Take 400 mg by mouth         PAST SURGICAL HISTORY:  Past Surgical History:   Procedure Laterality Date     ADENOIDECTOMY  1981     AS REPAIR OF NASAL SEPTUM       NASAL/SINUS POLYPECTOMY  2017    Polyps were benign      TONSILLECTOMY       TONSILLECTOMY  1981     TUBAL LIGATION         ALLERGIES:     Allergies   Allergen Reactions     Bee Venom Anaphylaxis, Difficulty breathing, Palpitations, Shortness Of Breath and Visual Disturbance     Patient does carry Epi-Pen     Chicken-Derived Products (Egg) Diarrhea     Other reaction(s): Nausea  Patient will self monitor  Other reaction(s): GI intolerance     Gabapentin      Gluten Meal      Lactose Dermatitis and Diarrhea     Milk Protein Extract      Other reaction(s): GI intolerance\  EGGS      Topiramate      Wheat Diarrhea     Wheat Bran      Other reaction(s): Nausea  Patient will self monitor       FAMILY HISTORY:  Family History   Problem Relation Age of Onset     Connective Tissue Disorder Mother      eds     Connective Tissue Disorder Sister      eds     CANCER Father      Spinal tumor grew into spinal cord, went in brain     Migraines Father      DIABETES Maternal Grandmother      Elderly diabetes     HEART DISEASE Maternal Grandmother      Hypertension Maternal Grandmother      DIABETES Paternal Grandmother      Elderly diabetes     DIABETES Paternal Grandfather      Elderly diabetes     Asthma Sister      Pediatric Asthma     Migraines Sister      Asthma Son      Pediatric Asthma     Thyroid Disease Sister      ,     Migraines Sister      SOCIAL HISTORY:  Social History   Substance Use Topics     Smoking status: Former Smoker     Packs/day: 0.20     Years: 10.00     Types: Cigarettes     Start date: 1/1/1991     Quit date: 12/1/2013     Smokeless tobacco: Never Used      Comment: I smoked on and off during specified dates.     Alcohol use Yes      Comment: couple per year       ROS:   Constitutional: No fever, chills, or sweats. Weight stable.   ENT: No visual disturbance, ear ache, epistaxis, sore throat.   Cardiovascular: As per HPI.   Respiratory: No cough, hemoptysis.    GI: No nausea, vomiting, hematemesis, melena, or hematochezia.   : No hematuria.   Integument:  "Negative.   Psychiatric: Negative.   Hematologic:  Easy bruising, no easy bleeding.  Neuro: Negative.   Endocrinology: No significant heat or cold intolerance   Musculoskeletal: No myalgia.    Exam:  Ht 1.702 m (5' 7\")  Wt 90.9 kg (200 lb 6.4 oz)  BMI 31.39 kg/m2  Gen: NAD   HEENT: NC. C -Collar in place.   CV: RRR  Pulm: CTAB   GI: s/nt/nd   Ext: No edema   Neuro: No focal defects   Labs:  CBC RESULTS:   Lab Results   Component Value Date    WBC 5.7 07/24/2017    RBC 4.41 07/24/2017    HGB 13.5 07/24/2017    HCT 39.5 07/24/2017    MCV 90 07/24/2017    MCH 30.6 07/24/2017    MCHC 34.2 07/24/2017    RDW 12.6 07/24/2017     07/24/2017       BMP RESULTS:  Lab Results   Component Value Date     07/24/2017    POTASSIUM 4.0 07/24/2017    CHLORIDE 110 (H) 07/24/2017    CO2 27 07/24/2017    ANIONGAP 5 07/24/2017    GLC 88 07/24/2017    BUN 8 07/24/2017    CR 0.85 07/24/2017    GFRESTIMATED 72 07/24/2017    GFRESTBLACK 87 07/24/2017    JUAN 8.9 07/24/2017        INR RESULTS:  Lab Results   Component Value Date    INR 1.01 07/25/2017       Procedures:    [address] [place] [address]  [phone]  Echocardiography  3/11/2016      Summary:  Left ventricle: Size was normal. Systolic function was normal by visual  assessment. Ejection fraction was estimated to be 55 %. There were no regional  wall motion abnormalities. Wall thickness was normal. Doppler parameters were  consistent with abnormal left ventricular relaxation (mild diastolic  dysfunction).  Aortic valve, LVOT: The valve was tricuspid. Aortic cusps demonstrated normal in  thickness and normal cuspal separation. There was no stenosis. There was no  regurgitation.  Mitral valve: Valve structure was normal. There was no evidence for stenosis.  There was no regurgitation.  Left atrium: Size was normal.  Right ventricle: The size was normal. Systolic function was normal. Wall  thickness was normal.  Tricuspid valve: There was " no significant regurgitation.  Right atrium: Size was normal.  Pericardium, pleura: There was no pericardial effusion.    Comparisons:  There has been no significant interval change. Comparison was made with the  previous study of 05-Aug-2008.    Left ventricle: Size was normal. Systolic function was normal by visual  assessment. Ejection fraction was estimated to be 55 %. There were no regional  wall motion abnormalities. Wall thickness was normal. Doppler: Doppler  parameters were consistent with abnormal left ventricular relaxation (mild  diastolic dysfunction).    Aortic valve: The valve was tricuspid. Aortic cusps demonstrated normal in  thickness and normal cuspal separation. Doppler: Transaortic velocity was  within the normal range. There was no stenosis. There was no regurgitation.    Aorta: The root exhibited normal size. The ascending aorta was normal in size.    Mitral valve: Valve structure was normal. There was normal leaflet separation.  Doppler: The transmitral velocity was within the normal range. There was no  evidence for stenosis. There was no regurgitation.    Left atrium: Size was normal.    Atrial septum: There was no shunting across the atrial septum.    Right ventricle: The size was normal. Systolic function was normal. Wall  thickness was normal.    Pulmonic valve: The valve structure was normal. Doppler: There was no  significant regurgitation.    Tricuspid valve: Doppler: There was no significant regurgitation.    Right atrium: Size was normal.    Systemic veins: IVC: The inferior vena cava was normal in size and course.  Respirophasic changes were normal.    Pericardium: There was no pericardial effusion. The pericardium was normal in  appearance.    Measurement tables    2D measurements  Left ventricle   (Reference normals)  LVID ed, PLAX   39 mm   (35-60)  LVID es, PLAX   23 mm   (21-40)  FS, PLAX   41.03 %   (25-46)  LVPW thickness ed   11 mm   (6-11)  Ratio, IVS/LVPW    0.91    (<1.3)  Ventricular septum   (Reference normals)  IVS thickness ed   10 mm   (6-11)  LVOT   (Reference normals)  Diam   24 mm   (--)  Left atrium   (Reference normals)  Vol index    13.5 cc/m squared   (11-38)    M-mode measurements  Aorta   (Reference normals)  Root diam ed   37 mm   (20-37)    Doppler measurements  LVOT   (Reference normals)  Peak britni   83 cm/s   (--)  VTI   14.6 cm   (--)  Stroke vol   66.05 ml   (--)  Aortic valve   (Reference normals)  Peak britni   103 cm/s   (--)  VTI   19.33 cm   (--)  DSI, VTI   0.76    (--)  Valve area, VTI   3.44 cm squared   (--)  Obstr index, Vmax   0.81    (--)  Valve area, Vmax   3.66 cm squared   (--)  Mitral valve   (Reference normals)  Peak E britni   57.6 cm/s   (--)  Peak A britni   67.2 cm/s   (--)  Peak E/A ratio   0.86    (--)    Prepared and electronically signed by    ALEX LOPEZ MD    Patient's Orthostatic Vitals today showed:   Lyin/93    Sitting 133/98    Standing 130/95    Standing (1 Minute) 133/97    Standing (3 Minute) 137/93      ILR showed HR in the 160-170's. Appeared to be sinus rhythm.    Assessment and Plan:   46 year old with a PMHx of Tom-Danlos, Chiari malformation, eosinophilic esophagitis, gastroparesis who was recently diagnosed with dysautonomia.    #Autonomic Dysfunction   -Patient has several signs of autonomic dysfunction including HR elevation with positional changes found today in clinic and HR elevation to the 170's on ILR. However, there are some atypical features such as HR elevation at home in any position (supine or standing).   -With regards to etiology, patient has a history of skin rashes with improvement with antihistamines. Thus, there is a possibility of mast cell activation. Will order serum and urine tests to further assess (ESR, CRP, histamine, metanephrines, prostaglandins)   -Given atypical features, will order full autonomic testing with tilt table to further characterize  autonomic disturbance.   -Given poor sensing of ILR, will order Cardionet to get a better understanding of patient's HR throughout the day.   -Educated patient on lifestyle changes (H20/Pedialyte combination, compression shorts, liberalizing salt intake).   -Based on results of autonomic testing, will likely initiate therapy with midodrine.     Sofia Leach PGY-4   Cardiology Fellow   Pager: [phone]    I very much appreciated the opportunity to see and assess Mrs Ada Welch in the clinic with CV Fellow Dr Leach.    Today I spent 25 min with patient reviewing her history, proposed lab evaluation and potential future Rx options. She voiced understanding and agreement. We will arrange autonomic testing based on her schedule.     Please do not hesitate to contact my office if you have any questions or concerns.      Kirk Russell MD  Cardiac Arrhythmia Service  [facility]  [phone]    Cc Morgan Tello MD ( [facility] )

## 2017-10-05 NOTE — PROGRESS NOTES
Pt seen in the [facility] [place] for [place] and iterative programming of a [employer] ILR, per MD orders. She is here to establish care with Dr. Russell. Today her intrinsic rhythm is a regular ventricular rhythm @ 110 bpm. Normal device function. 1 pause and 1 lucero episode recorded. The EGMs show undersensing. 14 tachy episodes recorded. The EGMs show tachycardias with the same morphology as her sinus beats, with rates 167-171 bpm. Pt states she no longer has her remote monitor. We will order her another one. Pt reports she is feeling well. Plan for pt to RTC in 3 months.  Implanted Loop Recorder

## 2017-10-05 NOTE — LETTER
10/5/2017      RE: Ada Welch  [address] [place] [address]       Dear Colleague,    Thank you for the opportunity to participate in the care of your patient, Ada Welch, at the [facility] HEART [facility] at [facility]. Please see a copy of my visit note below.    HPI: 46 year old with a PMHx of Tom-Danlos, Chiari malformation, eosinophilic esophagitis, gastroparesis who was recently diagnosed with dysautonomia.  She is referred by Dr Tello.     Patient used to run multiple miles/week around  without any symptoms. During that time, she noted that her HR would be around 40-50. Given her EDS, she underwent spinal surgery and over the past 3 years her heart rates have fluctuated tremendously. Her rates could range from as low as 40 to as high as 150. There are no factors that cause her HR's to fluctuate. This could happen when she is supine, sitting, standing, walking, etc. In addition to fluctuating HR, she noted that her BP would also fluctuate at the same time. She would have symptoms of LH and dizziness with both low and high heart rates.   In addition, she had two episodes of fainting. Patient had a prodrome of LH, dizziness, nausea, diaphoresis, black spots in vision before the episodes. She would black out for a few seconds and was noted to be pale by her .     She saw Dr. Tello with these complaints and patient had a ILR placed. ILR showed poor R wave sensing but did show tachycardia with rates in the 150's. In addition, she underwent tilt table testing at [place]; however, the results were unclear.     Patient's Orthostatic Vitals today showed:   Lyin/93    Sitting 133/98    Standing 130/95    Standing (1 Minute) 133/97    Standing (3 Minute) 137/93      Of note, patient has a history of allergic reactions. She states she often has skin rashes that sometimes improve with anti-histamines.       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     Chronic sinusitis 00/2007    Diagnosed with chronic pansinusitis 2016     Chronic tonsillitis 1980    Had tonsils removed 02/1981, rheumatic fever     Gastroesophageal reflux disease 3/1/2017    I have large hiatal hernia     Hernia, abdominal 10/2016    Hiatal Hernia     Hiatal hernia 2016    Have adeline hiatel hernia     Hoarseness Unsure    It comes and goes     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 70 capsule 3     buprenorphine (BUTRANS) 10 MCG/HR WK patch Place 1 patch onto the skin every 7 days 4 patch 0     meloxicam (MOBIC) 7.5 MG tablet Take 1 tablet (7.5 mg) by mouth daily 7 tablet 0     eletriptan (RELPAX) 20 MG tablet Take 1 tablet (20 mg) by mouth at onset of headache for migraine May repeat in 2 hours. Max 4 tablets/24 hours. 18 tablet 1     levothyroxine (SYNTHROID) 25 MCG tablet Take 25 mcg by mouth daily       amitriptyline (ELAVIL) 10 MG tablet Take 3 tablets (30 mg) by mouth At Bedtime 90 tablet 2     famotidine (PEPCID) 20 MG tablet Take 1 tablet (20 mg) by mouth At Bedtime 90  tablet 1     omeprazole (PRILOSEC) 20 MG CR capsule Take 1 capsule (20 mg) by mouth daily 90 capsule 1     ondansetron (ZOFRAN-ODT) 4 MG ODT tab Take 1-2 tablets (4-8 mg) by mouth every 12 hours as needed for nausea 60 tablet 1     metoclopramide (REGLAN) 5 MG tablet Take 1 tablet (5 mg) by mouth every 6 hours as needed 20 tablet 1     tiZANidine (ZANAFLEX) 4 MG tablet Take 2 tablets (8 mg) by mouth 3 times daily as needed for muscle spasms 90 tablet 3     EPINEPHrine (EPIPEN/ADRENACLICK/OR ANY BX GENERIC EQUIV) 0.3 MG/0.3ML injection 2-pack Inject 0.3 mg into the muscle       albuterol (PROAIR HFA/PROVENTIL HFA/VENTOLIN HFA) 108 (90 BASE) MCG/ACT Inhaler Inhale 2 puffs into the lungs       fluticasone (FLONASE) 50 MCG/ACT spray 2 sprays       COMPRESSION STOCKINGS 1 each daily 20-30 mmHg Thigh Length measure and fit, color and style per patient preference. Doff n jose per need. 3 each 3     SUMAtriptan (IMITREX STATDOSE) 6 MG/0.5ML pen injector kit Inject 0.5 mLs (6 mg) Subcutaneous at onset of headache for migraine (may repeat once after 2 hrs) May repeat in 1 hour. Max 12 mg/24 hours. 2 kit 1     ketamine, KETALAR, 5%-gabapentin, NEURONTIN, 8%-lidocaine 2.5% 5%-8% GEL topical PLO cream Apply 1 g topically 3 times daily as needed 30 g 3     fluticasone-salmeterol (ADVAIR DISKUS) 250-50 MCG/DOSE diskus inhaler Inhale 1 puff into the lungs 2 times daily as needed        cetirizine (ZYRTEC) 10 MG tablet Take 10 mg by mouth daily        polyethylene glycol (MIRALAX/GLYCOLAX) powder 1 capful Take 1 capful by mouth daily       Multiple Vitamins-Minerals (MULTIVITAMIN PO) Take by mouth daily       Probiotic Product (PROBIOTIC DAILY PO) Take by mouth 2 times daily 2 packets daily       [DISCONTINUED] zonisamide (ZONEGRAN) 25 MG capsule Take 25 mg daily for 3 days then 25 mg twice daily 60 capsule 3     traMADol (ULTRAM) 50 MG tablet Take 1 tablet (50 mg) by mouth every 6 hours as needed for pain maximum 4 tablet(s) per  day (Patient not taking: Reported on 10/5/2017) 15 tablet 0     etodolac (LODINE) 400 MG tablet Take 400 mg by mouth         PAST SURGICAL HISTORY:  Past Surgical History:   Procedure Laterality Date     ADENOIDECTOMY  1981     AS REPAIR OF NASAL SEPTUM       NASAL/SINUS POLYPECTOMY  2017    Polyps were benign     TONSILLECTOMY       TONSILLECTOMY  1981     TUBAL LIGATION         ALLERGIES:     Allergies   Allergen Reactions     Bee Venom Anaphylaxis, Difficulty breathing, Palpitations, Shortness Of Breath and Visual Disturbance     Patient does carry Epi-Pen     Chicken-Derived Products (Egg) Diarrhea     Other reaction(s): Nausea  Patient will self monitor  Other reaction(s): GI intolerance     Gabapentin      Gluten Meal      Lactose Dermatitis and Diarrhea     Milk Protein Extract      Other reaction(s): GI intolerance\  EGGS      Topiramate      Wheat Diarrhea     Wheat Bran      Other reaction(s): Nausea  Patient will self monitor       FAMILY HISTORY:  Family History   Problem Relation Age of Onset     Connective Tissue Disorder Mother      eds     Connective Tissue Disorder Sister      eds     CANCER Father      Spinal tumor grew into spinal cord, went in brain     Migraines Father      DIABETES Maternal Grandmother      Elderly diabetes     HEART DISEASE Maternal Grandmother      Hypertension Maternal Grandmother      DIABETES Paternal Grandmother      Elderly diabetes     DIABETES Paternal Grandfather      Elderly diabetes     Asthma Sister      Pediatric Asthma     Migraines Sister      Asthma Son      Pediatric Asthma     Thyroid Disease Sister      ,     Migraines Sister      SOCIAL HISTORY:  Social History   Substance Use Topics     Smoking status: Former Smoker     Packs/day: 0.20     Years: 10.00     Types: Cigarettes     Start date: 1/1/1991     Quit date: 12/1/2013     Smokeless tobacco: Never Used      Comment: I smoked on and off during specified dates.     Alcohol use Yes      Comment: couple  "per year       ROS:   Constitutional: No fever, chills, or sweats. Weight stable.   ENT: No visual disturbance, ear ache, epistaxis, sore throat.   Cardiovascular: As per HPI.   Respiratory: No cough, hemoptysis.    GI: No nausea, vomiting, hematemesis, melena, or hematochezia.   : No hematuria.   Integument: Negative.   Psychiatric: Negative.   Hematologic:  Easy bruising, no easy bleeding.  Neuro: Negative.   Endocrinology: No significant heat or cold intolerance   Musculoskeletal: No myalgia.    Exam:  Ht 1.702 m (5' 7\")  Wt 90.9 kg (200 lb 6.4 oz)  BMI 31.39 kg/m2  Gen: NAD   HEENT: NC. C -Collar in place.   CV: RRR  Pulm: CTAB   GI: s/nt/nd   Ext: No edema   Neuro: No focal defects   Labs:  CBC RESULTS:   Lab Results   Component Value Date    WBC 5.7 07/24/2017    RBC 4.41 07/24/2017    HGB 13.5 07/24/2017    HCT 39.5 07/24/2017    MCV 90 07/24/2017    MCH 30.6 07/24/2017    MCHC 34.2 07/24/2017    RDW 12.6 07/24/2017     07/24/2017       BMP RESULTS:  Lab Results   Component Value Date     07/24/2017    POTASSIUM 4.0 07/24/2017    CHLORIDE 110 (H) 07/24/2017    CO2 27 07/24/2017    ANIONGAP 5 07/24/2017    GLC 88 07/24/2017    BUN 8 07/24/2017    CR 0.85 07/24/2017    GFRESTIMATED 72 07/24/2017    GFRESTBLACK 87 07/24/2017    JUAN 8.9 07/24/2017        INR RESULTS:  Lab Results   Component Value Date    INR 1.01 07/25/2017       Procedures:    [address] [place] [address]  [phone]  Echocardiography  3/11/2016      Summary:  Left ventricle: Size was normal. Systolic function was normal by visual  assessment. Ejection fraction was estimated to be 55 %. There were no regional  wall motion abnormalities. Wall thickness was normal. Doppler parameters were  consistent with abnormal left ventricular relaxation (mild diastolic  dysfunction).  Aortic valve, LVOT: The valve was tricuspid. Aortic cusps demonstrated normal in  thickness and normal cuspal separation. " There was no stenosis. There was no  regurgitation.  Mitral valve: Valve structure was normal. There was no evidence for stenosis.  There was no regurgitation.  Left atrium: Size was normal.  Right ventricle: The size was normal. Systolic function was normal. Wall  thickness was normal.  Tricuspid valve: There was no significant regurgitation.  Right atrium: Size was normal.  Pericardium, pleura: There was no pericardial effusion.    Comparisons:  There has been no significant interval change. Comparison was made with the  previous study of 05-Aug-2008.    Left ventricle: Size was normal. Systolic function was normal by visual  assessment. Ejection fraction was estimated to be 55 %. There were no regional  wall motion abnormalities. Wall thickness was normal. Doppler: Doppler  parameters were consistent with abnormal left ventricular relaxation (mild  diastolic dysfunction).    Aortic valve: The valve was tricuspid. Aortic cusps demonstrated normal in  thickness and normal cuspal separation. Doppler: Transaortic velocity was  within the normal range. There was no stenosis. There was no regurgitation.    Aorta: The root exhibited normal size. The ascending aorta was normal in size.    Mitral valve: Valve structure was normal. There was normal leaflet separation.  Doppler: The transmitral velocity was within the normal range. There was no  evidence for stenosis. There was no regurgitation.    Left atrium: Size was normal.    Atrial septum: There was no shunting across the atrial septum.    Right ventricle: The size was normal. Systolic function was normal. Wall  thickness was normal.    Pulmonic valve: The valve structure was normal. Doppler: There was no  significant regurgitation.    Tricuspid valve: Doppler: There was no significant regurgitation.    Right atrium: Size was normal.    Systemic veins: IVC: The inferior vena cava was normal in size and course.  Respirophasic changes were normal.    Pericardium: There  was no pericardial effusion. The pericardium was normal in  appearance.    Measurement tables    2D measurements  Left ventricle   (Reference normals)  LVID ed, PLAX   39 mm   (35-60)  LVID es, PLAX   23 mm   (21-40)  FS, PLAX   41.03 %   (25-46)  LVPW thickness ed   11 mm   (6-11)  Ratio, IVS/LVPW   0.91    (<1.3)  Ventricular septum   (Reference normals)  IVS thickness ed   10 mm   (6-11)  LVOT   (Reference normals)  Diam   24 mm   (--)  Left atrium   (Reference normals)  Vol index    13.5 cc/m squared   (11-38)    M-mode measurements  Aorta   (Reference normals)  Root diam ed   37 mm   (20-37)    Doppler measurements  LVOT   (Reference normals)  Peak britni   83 cm/s   (--)  VTI   14.6 cm   (--)  Stroke vol   66.05 ml   (--)  Aortic valve   (Reference normals)  Peak britni   103 cm/s   (--)  VTI   19.33 cm   (--)  DSI, VTI   0.76    (--)  Valve area, VTI   3.44 cm squared   (--)  Obstr index, Vmax   0.81    (--)  Valve area, Vmax   3.66 cm squared   (--)  Mitral valve   (Reference normals)  Peak E britni   57.6 cm/s   (--)  Peak A britni   67.2 cm/s   (--)  Peak E/A ratio   0.86    (--)    Prepared and electronically signed by    ALEX LOPEZ MD    Patient's Orthostatic Vitals today showed:   Lyin/93    Sitting 133/98    Standing 130/95    Standing (1 Minute) 133/97    Standing (3 Minute) 137/93      ILR showed HR in the 160-170's. Appeared to be sinus rhythm.    Assessment and Plan:   46 year old with a PMHx of Tom-Danlos, Chiari malformation, eosinophilic esophagitis, gastroparesis who was recently diagnosed with dysautonomia.    #Autonomic Dysfunction   -Patient has several signs of autonomic dysfunction including HR elevation with positional changes found today in clinic and HR elevation to the 170's on ILR. However, there are some atypical features such as HR elevation at home in any position (supine or standing).   -With regards to etiology, patient has a history of skin  rashes with improvement with antihistamines. Thus, there is a possibility of mast cell activation. Will order serum and urine tests to further assess (ESR, CRP, histamine, metanephrines, prostaglandins)   -Given atypical features, will order full autonomic testing with tilt table to further characterize autonomic disturbance.   -Given poor sensing of ILR, will order Cardionet to get a better understanding of patient's HR throughout the day.   -Educated patient on lifestyle changes (H20/Pedialyte combination, compression shorts, liberalizing salt intake).   -Based on results of autonomic testing, will likely initiate therapy with midodrine.     Sofia Leach PGY-4   Cardiology Fellow   Pager: [phone]    I very much appreciated the opportunity to see and assess Mrs Ada Welch in the clinic with CV Fellow Dr Leach.    Today I spent 25 min with patient reviewing her history, proposed lab evaluation and potential future Rx options. She voiced understanding and agreement. We will arrange autonomic testing based on her schedule.     Please do not hesitate to contact my office if you have any questions or concerns.      Kirk Russell MD  Cardiac Arrhythmia Service  [facility]  [phone]    Cc Morgan Tello MD ( [facility] )

## 2017-10-05 NOTE — TELEPHONE ENCOUNTER
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     Chronic sinusitis 00/2007    Diagnosed with chronic pansinusitis 2016     Chronic tonsillitis 1980    Had tonsils removed 02/1981, rheumatic fever     Gastroesophageal reflux disease 3/1/2017    I have large hiatal hernia     Hernia, abdominal 10/2016    Hiatal Hernia     Hiatal hernia 2016    Have adeline hiatel hernia     Hoarseness Unsure    It comes and goes     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 70 capsule 3     buprenorphine (BUTRANS) 10 MCG/HR WK patch Place 1 patch onto the skin every 7 days 4 patch 0     meloxicam (MOBIC) 7.5 MG tablet Take 1 tablet (7.5 mg) by mouth daily 7 tablet 0     zonisamide (ZONEGRAN) 25 MG capsule Take 25 mg daily for 3 days then 25 mg twice daily 60 capsule 3     eletriptan (RELPAX) 20 MG tablet Take 1 tablet (20 mg) by mouth at onset of headache for migraine May repeat in 2 hours. Max 4 tablets/24 hours. 18 tablet 1     levothyroxine (SYNTHROID) 25 MCG tablet Take 25 mcg by mouth daily       amitriptyline (ELAVIL) 10 MG tablet Take 3 tablets  (30 mg) by mouth At Bedtime 90 tablet 2     famotidine (PEPCID) 20 MG tablet Take 1 tablet (20 mg) by mouth At Bedtime 90 tablet 1     omeprazole (PRILOSEC) 20 MG CR capsule Take 1 capsule (20 mg) by mouth daily 90 capsule 1     ondansetron (ZOFRAN-ODT) 4 MG ODT tab Take 1-2 tablets (4-8 mg) by mouth every 12 hours as needed for nausea 60 tablet 1     metoclopramide (REGLAN) 5 MG tablet Take 1 tablet (5 mg) by mouth every 6 hours as needed 20 tablet 1     traMADol (ULTRAM) 50 MG tablet Take 1 tablet (50 mg) by mouth every 6 hours as needed for pain maximum 4 tablet(s) per day 15 tablet 0     tiZANidine (ZANAFLEX) 4 MG tablet Take 2 tablets (8 mg) by mouth 3 times daily as needed for muscle spasms 90 tablet 3     EPINEPHrine (EPIPEN/ADRENACLICK/OR ANY BX GENERIC EQUIV) 0.3 MG/0.3ML injection 2-pack Inject 0.3 mg into the muscle       albuterol (PROAIR HFA/PROVENTIL HFA/VENTOLIN HFA) 108 (90 BASE) MCG/ACT Inhaler Inhale 2 puffs into the lungs       fluticasone (FLONASE) 50 MCG/ACT spray 2 sprays       etodolac (LODINE) 400 MG tablet Take 400 mg by mouth       COMPRESSION STOCKINGS 1 each daily 20-30 mmHg Thigh Length measure and fit, color and style per patient preference. Doff n jose per need. 3 each 3     SUMAtriptan (IMITREX STATDOSE) 6 MG/0.5ML pen injector kit Inject 0.5 mLs (6 mg) Subcutaneous at onset of headache for migraine (may repeat once after 2 hrs) May repeat in 1 hour. Max 12 mg/24 hours. 2 kit 1     ketamine, KETALAR, 5%-gabapentin, NEURONTIN, 8%-lidocaine 2.5% 5%-8% GEL topical PLO cream Apply 1 g topically 3 times daily as needed 30 g 3     fluticasone-salmeterol (ADVAIR DISKUS) 250-50 MCG/DOSE diskus inhaler Inhale 1 puff into the lungs 2 times daily as needed        cetirizine (ZYRTEC) 10 MG tablet Take 10 mg by mouth daily        polyethylene glycol (MIRALAX/GLYCOLAX) powder        Multiple Vitamins-Minerals (MULTIVITAMIN PO) Take by mouth daily       Probiotic Product (PROBIOTIC DAILY PO) Take  by mouth 2 times daily         PAST SURGICAL HISTORY:  Past Surgical History:   Procedure Laterality Date     ADENOIDECTOMY  1981     AS REPAIR OF NASAL SEPTUM       NASAL/SINUS POLYPECTOMY  2017    Polyps were benign     TONSILLECTOMY       TONSILLECTOMY  1981     TUBAL LIGATION         ALLERGIES:     Allergies   Allergen Reactions     Bee Venom Anaphylaxis, Difficulty breathing, Palpitations, Shortness Of Breath and Visual Disturbance     Patient does carry Epi-Pen     Chicken-Derived Products (Egg) Diarrhea     Other reaction(s): Nausea  Patient will self monitor  Other reaction(s): GI intolerance     Gabapentin      Gluten Meal      Lactose Dermatitis and Diarrhea     Milk Protein Extract      Other reaction(s): GI intolerance\  EGGS      Topiramate      Wheat Diarrhea     Wheat Bran      Other reaction(s): Nausea  Patient will self monitor       FAMILY HISTORY:  Family History   Problem Relation Age of Onset     Connective Tissue Disorder Mother      eds     Connective Tissue Disorder Sister      eds     CANCER Father      Spinal tumor grew into spinal cord, went in brain     Migraines Father      DIABETES Maternal Grandmother      Elderly diabetes     HEART DISEASE Maternal Grandmother      Hypertension Maternal Grandmother      DIABETES Paternal Grandmother      Elderly diabetes     DIABETES Paternal Grandfather      Elderly diabetes     Asthma Sister      Pediatric Asthma     Migraines Sister      Asthma Son      Pediatric Asthma     Thyroid Disease Sister      ,     Migraines Sister      SOCIAL HISTORY:  Social History   Substance Use Topics     Smoking status: Former Smoker     Packs/day: 0.20     Years: 10.00     Types: Cigarettes     Start date: 1/1/1991     Quit date: 12/1/2013     Smokeless tobacco: Never Used      Comment: I smoked on and off during specified dates.     Alcohol use Yes      Comment: couple per year       ROS:   Constitutional: No fever, chills, or sweats. Weight stable.   ENT: No  visual disturbance, ear ache, epistaxis, sore throat.   Cardiovascular: As per HPI.   Respiratory: No cough, hemoptysis.    GI: No nausea, vomiting, hematemesis, melena, or hematochezia.   : No hematuria.   Integument: Negative.   Psychiatric: Negative.   Hematologic:  Easy bruising, no easy bleeding.  Neuro: Negative.   Endocrinology: No significant heat or cold intolerance   Musculoskeletal: No myalgia.    Exam:  There were no vitals taken for this visit.  GENERAL APPEARANCE: healthy, alert and no distress  HEENT: no icterus, no xanthelasmas, normal pupil size and reaction, normal palate, mucosa moist, no central cyanosis  NECK: no adenopathy, no asymmetry, masses, or scars, thyroid normal to palpation and no bruits, JVP not elevated  RESPIRATORY: lungs clear to auscultation - no rales, rhonchi or wheezes, no use of accessory muscles, no retractions, respirations are unlabored, normal respiratory rate  CARDIOVASCULAR: regular rhythm, normal S1 with physiologic split S2, no S3 or S4 and no murmur, click or rub, precordium quiet with normal PMI.  ABDOMEN: soft, non tender, without hepatosplenomegaly, no masses palpable, bowel sounds normal, aorta not enlarged by palpation, no abdominal bruits  EXTREMITIES: peripheral pulses normal, no edema, no bruits  NEURO: alert and oriented to person/place/time, normal speech, gait and affect  VASC: Radial, femoral, dorsalis pedis and posterior tibialis pulses are normal in volumes and symmetric bilaterally. No bruits are heard.  SKIN: no ecchymoses, no rashes    Labs:  CBC RESULTS:   Lab Results   Component Value Date    WBC 5.7 07/24/2017    RBC 4.41 07/24/2017    HGB 13.5 07/24/2017    HCT 39.5 07/24/2017    MCV 90 07/24/2017    MCH 30.6 07/24/2017    MCHC 34.2 07/24/2017    RDW 12.6 07/24/2017     07/24/2017       BMP RESULTS:  Lab Results   Component Value Date     07/24/2017    POTASSIUM 4.0 07/24/2017    CHLORIDE 110 (H) 07/24/2017    CO2 27 07/24/2017     ANIONGAP 5 07/24/2017    GLC 88 07/24/2017    BUN 8 07/24/2017    CR 0.85 07/24/2017    GFRESTIMATED 72 07/24/2017    GFRESTBLACK 87 07/24/2017    JUAN 8.9 07/24/2017        INR RESULTS:  Lab Results   Component Value Date    INR 1.01 07/25/2017       Procedures:    [facility]  [address] [place] [address]  [phone]  Echocardiography  3/11/2016      Summary:  Left ventricle: Size was normal. Systolic function was normal by visual  assessment. Ejection fraction was estimated to be 55 %. There were no regional  wall motion abnormalities. Wall thickness was normal. Doppler parameters were  consistent with abnormal left ventricular relaxation (mild diastolic  dysfunction).  Aortic valve, LVOT: The valve was tricuspid. Aortic cusps demonstrated normal in  thickness and normal cuspal separation. There was no stenosis. There was no  regurgitation.  Mitral valve: Valve structure was normal. There was no evidence for stenosis.  There was no regurgitation.  Left atrium: Size was normal.  Right ventricle: The size was normal. Systolic function was normal. Wall  thickness was normal.  Tricuspid valve: There was no significant regurgitation.  Right atrium: Size was normal.  Pericardium, pleura: There was no pericardial effusion.    Comparisons:  There has been no significant interval change. Comparison was made with the  previous study of 05-Aug-2008.    Left ventricle: Size was normal. Systolic function was normal by visual  assessment. Ejection fraction was estimated to be 55 %. There were no regional  wall motion abnormalities. Wall thickness was normal. Doppler: Doppler  parameters were consistent with abnormal left ventricular relaxation (mild  diastolic dysfunction).    Aortic valve: The valve was tricuspid. Aortic cusps demonstrated normal in  thickness and normal cuspal separation. Doppler: Transaortic velocity was  within the normal range. There was no stenosis. There was no  regurgitation.    Aorta: The root exhibited normal size. The ascending aorta was normal in size.    Mitral valve: Valve structure was normal. There was normal leaflet separation.  Doppler: The transmitral velocity was within the normal range. There was no  evidence for stenosis. There was no regurgitation.    Left atrium: Size was normal.    Atrial septum: There was no shunting across the atrial septum.    Right ventricle: The size was normal. Systolic function was normal. Wall  thickness was normal.    Pulmonic valve: The valve structure was normal. Doppler: There was no  significant regurgitation.    Tricuspid valve: Doppler: There was no significant regurgitation.    Right atrium: Size was normal.    Systemic veins: IVC: The inferior vena cava was normal in size and course.  Respirophasic changes were normal.    Pericardium: There was no pericardial effusion. The pericardium was normal in  appearance.    Measurement tables    2D measurements  Left ventricle   (Reference normals)  LVID ed, PLAX   39 mm   (35-60)  LVID es, PLAX   23 mm   (21-40)  FS, PLAX   41.03 %   (25-46)  LVPW thickness ed   11 mm   (6-11)  Ratio, IVS/LVPW   0.91    (<1.3)  Ventricular septum   (Reference normals)  IVS thickness ed   10 mm   (6-11)  LVOT   (Reference normals)  Diam   24 mm   (--)  Left atrium   (Reference normals)  Vol index    13.5 cc/m squared   (11-38)    M-mode measurements  Aorta   (Reference normals)  Root diam ed   37 mm   (20-37)    Doppler measurements  LVOT   (Reference normals)  Peak britni   83 cm/s   (--)  VTI   14.6 cm   (--)  Stroke vol   66.05 ml   (--)  Aortic valve   (Reference normals)  Peak britni   103 cm/s   (--)  VTI   19.33 cm   (--)  DSI, VTI   0.76    (--)  Valve area, VTI   3.44 cm squared   (--)  Obstr index, Vmax   0.81    (--)  Valve area, Vmax   3.66 cm squared   (--)  Mitral valve   (Reference normals)  Peak E britni   57.6 cm/s   (--)  Peak A britni   67.2 cm/s   (--)  Peak E/A ratio    0.86    (--)    Prepared and electronically signed by    ALEX LOPEZ MD        Assessment and Plan:       CC

## 2017-10-06 ENCOUNTER — THERAPY VISIT (OUTPATIENT)
Dept: SPEECH THERAPY | Facility: CLINIC | Age: 47
End: 2017-10-06
Attending: INTERNAL MEDICINE

## 2017-10-06 DIAGNOSIS — R13.12 OROPHARYNGEAL DYSPHAGIA: ICD-10-CM

## 2017-10-06 NOTE — MR AVS SNAPSHOT
After Visit Summary   10/6/2017    Ada Welch    MRN: [id number]           Patient Information     Date Of Birth          1970        Visit Information        Provider Department      10/6/2017 9:00 AM Lovely Coronado SLP [facility]        Today's Diagnoses     Oropharyngeal dysphagia           Follow-ups after your visit        Your next 10 appointments already scheduled     Oct 27, 2017  9:20 AM CDT   (Arrive by 9:05 AM)   Return Visit with Faiza Martinez MD   [facility] ([facility])    [address] [place], [place] [id number]   [phone]            Oct 30, 2017 10:00 AM CDT   (Arrive by 9:45 AM)   New Patient Visit with Jay Guajardo MD   [facility] ([facility])    [address] [place] [id number] [phone]            Nov 07, 2017  9:30 AM CST   (Arrive by 9:15 AM)   EMG with Jorge Pickard MD   [facility] ([facility])    [address] [place] [id number] [phone]           Do not use lotions or creams on the area to be tested. If you are on blood thinners (Warfarin, Coumadin, Lovenox, etc), please contact your primary care physician to check if it is safe to stop them 3 days prior to testing. If you have anxiety, please check with your referring physician to obtain anti-anxiety medication for the procedure.            Nov 13, 2017 10:30 AM CST   (Arrive by 10:15 AM)   Return Visit with Ellyn Rivera MD   [facility] ([facility])    [address] [place] [id number] [phone]            Nov 30, 2017  9:30 AM CST   (Arrive by 9:15 AM)   New Patient Visit with Bertha Moncada MD   [facility] Neurosurgery ([facility])    [address] [place] [address]  [address] [facility] [address]   [phone]            Dec 06, 2017 11:15 AM CST   (Arrive by 11:00 AM)   Return Visit with Wade Singh MD   [facility] ([facility])    [address] [facility]  [address] [facility] [address]   [phone]            Dec 09, 2017  9:10 AM CST   (Arrive by 8:55 AM)   Return Visit with Seth Galeano MD   [facility] Neurology ([facility])    [address] [facility]  [address] [facility] [address]   [phone]            Feb 05, 2018 11:00 AM CST   (Arrive by 10:45 AM)   Implantable Loop Recorder with Uc Cv Device 1   [facility] ([facility])    [address] [address]   [phone]            Feb 05, 2018 11:30 AM CST   (Arrive by 11:15 AM)   RETURN ARRHYTHMIA with FIDELINA Blanc [facility] ([facility])    [address] [address]   [phone]              Future tests that were ordered for you today     Open Future Orders        Priority Expected Expires Ordered    Catecholamines fractioned free urine Routine 10/5/2017 12/21/2017 10/5/2017    Histamine urine Routine 10/5/2017 12/21/2017 10/5/2017    N-Methylhistamine Urine 24 hour Routine 10/5/2017 12/21/2017 10/5/2017    Prostaglandins D2 Urine: 24 hour Routine 10/5/2017 12/21/2017 10/5/2017            Who to contact     Please call your clinic at [phone] to:    Ask questions about your health    Make or cancel appointments    Discuss your medicines    Learn about your test results    Speak to your doctor   If you have compliments or concerns about an experience at your clinic, or if you wish to file a complaint, please contact [facility] Patient Relations at [phone] or email us at  [email or web address]         Additional Information About Your Visit        [employer] Information     [employer] gives you secure access to your electronic health record. If you see a primary care provider, you can also send messages to your care team and make appointments. If you have questions, please call your primary care clinic.  If you do not have a primary care provider, please call [phone] and they will assist you.      [email or web address] is an electronic gateway that provides easy, online access to your medical records. With [email or web address], you can request a clinic appointment, read your test results, renew a prescription or communicate with your care team.     To access your existing account, please contact your [facility] or call [phone] for assistance.        Care EveryWhere ID     This is your Care EveryWhere ID. This could be used by other organizations to access your [facility] medical records  [id number]         Blood Pressure from Last 3 Encounters:   10/05/17 (!) 137/93   10/04/17 113/76   10/02/17 130/79    Weight from Last 3 Encounters:   10/05/17 90.9 kg (200 lb 6.4 oz)   10/04/17 90.8 kg (200 lb 3.2 oz)   10/02/17 90.3 kg (199 lb)              We Performed the Following     SPEECH THERAPY REFERRAL        Primary Care Provider Office Phone # Fax #    Ellyn Rivera [phone]       [address] [place] [address] [address] [place] [address]        Equal Access to Services     CHRISTINA GUY AH: Hadii kia romoo Sonegrito, waaxda luqadaha, qaybta kaalmada adeegyada, wax charley tang. So [facility] [phone].    ATENCIÓN: Si habla español, tiene a sweeney disposición servicios gratuitos de asistencia lingüística. Llame al [phone].    We comply with applicable federal civil rights laws and [place] laws. We do not discriminate on the basis of race, color, national origin, age, disability, sex, sexual orientation, or gender  identity.            Thank you!     Thank you for choosing [facility]  for your care. Our goal is always to provide you with excellent care. Hearing back from our patients is one way we can continue to improve our services. Please take a few minutes to complete the written survey that you may receive in the mail after your visit with us. Thank you!             Your Updated Medication List - Protect others around you: Learn how to safely use, store and throw away your medicines at www.disposemymeds.org.          This list is accurate as of: 10/6/17 10:25 AM.  Always use your most recent med list.                   Brand Name Dispense Instructions for use Diagnosis    ADVAIR DISKUS 250-50 MCG/DOSE diskus inhaler   Generic drug:  fluticasone-salmeterol      Inhale 1 puff into the lungs 2 times daily as needed        albuterol 108 (90 BASE) MCG/ACT Inhaler    PROAIR HFA/PROVENTIL HFA/VENTOLIN HFA     Inhale 2 puffs into the lungs        amitriptyline 10 MG tablet    ELAVIL    90 tablet    Take 3 tablets (30 mg) by mouth At Bedtime    Chronic migraine without aura without status migrainosus, not intractable       buprenorphine 10 MCG/HR WK patch    BUTRANS    4 patch    Place 1 patch onto the skin every 7 days    Postlaminectomy syndrome of lumbar region       cetirizine 10 MG tablet    zyrTEC     Take 10 mg by mouth daily        COMPRESSION STOCKINGS     3 each    1 each daily 20-30 mmHg Thigh Length measure and fit, color and style per patient preference. Doff n jose per need.    Orthostatic hypotension       eletriptan 20 MG tablet    RELPAX    18 tablet    Take 1 tablet (20 mg) by mouth at onset of headache for migraine May repeat in 2 hours. Max 4 tablets/24 hours.    Chronic daily headache, Intractable migraine without aura and with status migrainosus       EPINEPHrine 0.3 MG/0.3ML injection 2-pack    EPIPEN/ADRENACLICK/or ANY BX GENERIC EQUIV     Inject 0.3 mg into the muscle        etodolac 400 MG tablet     LODINE     Take 400 mg by mouth        famotidine 20 MG tablet    PEPCID    90 tablet    Take 1 tablet (20 mg) by mouth At Bedtime    Hiatal hernia, Mast cell disorder       fluticasone 50 MCG/ACT spray    FLONASE     2 sprays        ketamine (KETALAR) 5%-gabapentin (NEURONTIN) 8%-lidocaine 2.5% 5%-8% Gel topical PLO cream     30 g    Apply 1 g topically 3 times daily as needed    Postlaminectomy syndrome of lumbosacral region, Postlaminectomy syndrome of lumbar region       meloxicam 7.5 MG tablet    MOBIC    7 tablet    Take 1 tablet (7.5 mg) by mouth daily    Chronic migraine without aura without status migrainosus, not intractable       metoclopramide 5 MG tablet    REGLAN    20 tablet    Take 1 tablet (5 mg) by mouth every 6 hours as needed    Intractable chronic migraine without aura and with status migrainosus       MULTIVITAMIN PO      Take by mouth daily        omeprazole 20 MG CR capsule    priLOSEC    90 capsule    Take 1 capsule (20 mg) by mouth daily    Hiatal hernia       ondansetron 4 MG ODT tab    ZOFRAN-ODT    60 tablet    Take 1-2 tablets (4-8 mg) by mouth every 12 hours as needed for nausea    Migraine without status migrainosus, not intractable, unspecified migraine type       polyethylene glycol powder    MIRALAX/GLYCOLAX     1 capful Take 1 capful by mouth daily        PROBIOTIC DAILY PO      Take by mouth 2 times daily 2 packets daily        SUMAtriptan 6 MG/0.5ML pen injector kit    IMITREX STATDOSE    2 kit    Inject 0.5 mLs (6 mg) Subcutaneous at onset of headache for migraine (may repeat once after 2 hrs) May repeat in 1 hour. Max 12 mg/24 hours.    Chronic migraine without aura without status migrainosus, not intractable       SYNTHROID 25 MCG tablet   Generic drug:  levothyroxine      Take 25 mcg by mouth daily        tiZANidine 4 MG tablet    ZANAFLEX    90 tablet    Take 2 tablets (8 mg) by mouth 3 times daily as needed for muscle spasms    Post laminectomy syndrome       traMADol 50  MG tablet    ULTRAM    15 tablet    Take 1 tablet (50 mg) by mouth every 6 hours as needed for pain maximum 4 tablet(s) per day    Migraine with aura and without status migrainosus, not intractable       zonisamide 25 MG capsule    Zonegran    70 capsule    Take 1 tablet (25 mg) once daily for 1 week, then 2 tablets once daily for 1 week, then 3 tablets once daily for 1 week, then 4 tablets once daily for 1 week.    Occipital neuritis

## 2017-10-06 NOTE — PROGRESS NOTES
10/06/17 0900   General Information   Type Of Visit Initial   Start Of Care Date 10/06/17   Referring Physician Dr Jerad Hebert   Orders Evaluate And Treat   Orders Comment Video Swallow Study   Medical Diagnosis Dysphagia, Irritable Bowel syndrome   Onset Of Illness/injury Or Date Of Surgery 09/12/17   Hearing WFL   Pertinent History of Current Problem/OT: Additional Occupational Profile Info Ms Welch is a 46 year old woman who has a complex past medical history including Tom-Danlos syndrome, hiatal hernia, chiari malformation, eosinophilic esophagitis, gastroparesis, dysautonomia. She presents today with reported feeling of foods/liquid getting stuck and causing her to vomit. Sometimes they get stuck in her throat. She reports significant difficulty with meats. She has been avoiding meats lately due to challenge.    Respiratory Status Room air   Prior Level Of Function Swallowing   Prior Level Of Function Comment Regular diet with thin liquids   General Observations Pt pleasant and cooperative   Patient/family Goals Pt wants to eat more easily   Clinical Swallow Evaluation   Oral Musculature generally intact   Structural Abnormalities none present   Dentition present and adequate   Mucosal Quality good   Mandibular Strength and Mobility intact   Oral Labial Strength and Mobility WFL   Lingual Strength and Mobility WFL   Velar Elevation intact   Buccal Strength and Mobility intact   Laryngeal Function Cough;Swallow;Voicing initiated   VFSS Eval: Radiology   Radiologist Resident   Views Taken left lateral;A/P   Physical Location of Procedure [facility]   VFSS Eval: Thin Liquid Texture Trial   Mode of Presentation, Thin Liquid cup;self-fed   Order of Presentation 1,2,6,7   Preparatory Phase WFL   Oral Phase, Thin Liquid WFL   Pharyngeal Phase, Thin Liquid WFL   Rosenbek's Penetration Aspiration Scale: Thin Liquid Trial Results 4 - contrast contacts vocal cords, no residue remains  (penetration)   Diagnostic Statement Deep penetration to the vocal folds noted however it spotnaneously resolved.    VFSS Eval: Nectar Thick Liquid Texture Trial   Mode of Presentation, Nectar cup;self-fed   Order of Presentation 3   Preparatory Phase WFL   Oral Phase, Nectar WFL   Pharyngeal Phase, [place] WFL   Rosenbek's Penetration Aspiration Scale: Nectar-Thick Liquid Trial Results 1 - no aspiration, contrast does not enter airway   Diagnostic Statement No aspiration/penetration noted on nectar thick liquid.    VFSS Eval: Puree Solid Texture Trial   Mode of Presentation, Puree spoon;self-fed   Order of Presentation 4   Preparatory Phase WFL   Oral Phase, Puree WFL   Pharyngeal Phase, Puree WFL   Rosenbek's Penetration Aspiration Scale: Puree Food Trial Results 1 - no aspiration, contrast does not enter airway   Diagnostic Statement No aspiration/penetration noted on puree consistency.   VFSS Eval: Solid Food Texture Trial   Mode of Presentation, Solid self-fed   Order of Presentation 5   Preparatory Phase WFL   Oral Phase, Solid WFL   Pharyngeal Phase, Solid WFL   Rosenbek's Penetration Aspiration Scale: Solid Food Trial Results 1 - no aspiration, contrast does not enter airway   Diagnostic Statement No aspiration/penetration noted on solid consistency trials.    Swallow Compensations   Swallow Compensations No compensations were used   Educational Assessment   Barriers to Learning No barriers   Esophageal Phase of Swallow   Patient reports or presents with symptoms of esophageal dysphagia Yes   Esophageal comments Esophagram completed. Please refer to radiologist report for further details.    Swallow Eval: Clinical Impressions   Skilled Criteria for Therapy Intervention No problems identified which require skilled intervention   Dysphagia Outcome Severity Scale (ANGELICA) Level 6 - ANGELICA   Treatment Diagnosis Minimal oropharyngeal dysphagia   Diet texture recommendations Regular diet;Thin liquids   Recommended  Feeding/Eating Techniques alternate between small bites and sips of food/liquid;maintain upright posture during/after eating for 30 mins;small sips/bites   Predicted Duration of Therapy Intervention (days/wks) Evaluation only   Anticipated Discharge Disposition home   Risks and Benefits of Treatment have been explained. Yes   Patient, family and/or staff in agreement with Plan of Care Yes   Clinical Impression Comments Ms Welch demonstrates safe functional swallow at this time. She is noted to have intermittent deep penetration to the vocal folds on thin liquid trials which spontaneously resolves. No aspiration noted on any trials presented. Pt demonstrates timely swallow and minimal pharyngeal residue which is cleared with spontaneous second swallow. Adequate oral manipulation of each bolus. Pt removed her Aspen C Collar during study however reports occasionally eating/drinking with C Collar inplace. Esophagram completed following VFSS. Please refer to Radiologist report for further details. Recommend pt continue regular consistency diet with thin liquids. Encouraged softer meat and increased moisture to her foods to make them easier to swallow. Pt to sit upright for all po intake. Encouraged small bites/sips and slow rate. No further SLP services indicated at this time. Thank you kindly for this referral.    Total Session Time   Total Session Time 45   Total Evaluation Time 45

## 2017-10-09 LAB
ANNOTATION COMMENT IMP: ABNORMAL
METANEPHS SERPL-SCNC: 0.31 NMOL/L (ref 0–0.49)
NORMETANEPHRINE SERPL-SCNC: 0.92 NMOL/L (ref 0–0.89)

## 2017-10-10 ENCOUNTER — CARE COORDINATION (OUTPATIENT)
Dept: CARDIOLOGY | Facility: CLINIC | Age: 47
End: 2017-10-10

## 2017-10-10 DIAGNOSIS — G90.1 DYSAUTONOMIA (H): Primary | ICD-10-CM

## 2017-10-10 DIAGNOSIS — Q79.60 EDS (EHLERS-DANLOS SYNDROME): ICD-10-CM

## 2017-10-10 RX ORDER — MELOXICAM 7.5 MG/1
7.5 TABLET ORAL DAILY
Qty: 30 TABLET | Refills: 1 | Status: SHIPPED | OUTPATIENT
Start: 2017-10-10 | End: 2017-10-27 | Stop reason: ALTCHOICE

## 2017-10-10 RX ORDER — LIDOCAINE 40 MG/G
CREAM TOPICAL
Status: CANCELLED | OUTPATIENT
Start: 2017-10-10

## 2017-10-10 RX ORDER — SODIUM CHLORIDE 9 MG/ML
INJECTION, SOLUTION INTRAVENOUS CONTINUOUS
Status: CANCELLED | OUTPATIENT
Start: 2017-10-10

## 2017-10-10 NOTE — TELEPHONE ENCOUNTER
Refill request    Medication: buprenorphine (BUTRANS) 10 MCG/HR WK patch  Place 1 patch onto the skin every 7 days      MNPMP Checked: Yes     buprenorphine (BUTRANS) 10 MCG/HR WK patch- Last refilled 09/07/17 for 4 patches       Refilled: YES    Last clinic appointment:9/14/17   Next clinic appointment:10/27/17    Patient requested to:     prescription in clinic  Sent to pharmacy  Sent to address on file

## 2017-10-20 ENCOUNTER — HOSPITAL ENCOUNTER (OUTPATIENT)
Facility: CLINIC | Age: 47
Discharge: HOME | End: 2017-10-20
Attending: INTERNAL MEDICINE | Admitting: INTERNAL MEDICINE
Payer: COMMERCIAL

## 2017-10-20 ENCOUNTER — MYC MEDICAL ADVICE (OUTPATIENT)
Dept: INTERNAL MEDICINE | Facility: CLINIC | Age: 47
End: 2017-10-20

## 2017-10-20 ENCOUNTER — APPOINTMENT (OUTPATIENT)
Dept: CARDIOLOGY | Facility: CLINIC | Age: 47
End: 2017-10-20
Attending: INTERNAL MEDICINE
Payer: COMMERCIAL

## 2017-10-20 ENCOUNTER — APPOINTMENT (OUTPATIENT)
Dept: MEDSURG UNIT | Facility: CLINIC | Age: 47
End: 2017-10-20
Attending: INTERNAL MEDICINE
Payer: COMMERCIAL

## 2017-10-20 VITALS
HEIGHT: 67 IN | BODY MASS INDEX: 31.45 KG/M2 | WEIGHT: 200.4 LBS | OXYGEN SATURATION: 98 % | SYSTOLIC BLOOD PRESSURE: 102 MMHG | HEART RATE: 94 BPM | DIASTOLIC BLOOD PRESSURE: 68 MMHG | TEMPERATURE: 97.7 F

## 2017-10-20 DIAGNOSIS — G90.A POTS (POSTURAL ORTHOSTATIC TACHYCARDIA SYNDROME): ICD-10-CM

## 2017-10-20 DIAGNOSIS — Q79.60 EDS (EHLERS-DANLOS SYNDROME): ICD-10-CM

## 2017-10-20 DIAGNOSIS — G90.1 DYSAUTONOMIA (H): ICD-10-CM

## 2017-10-20 LAB
ANION GAP SERPL CALCULATED.3IONS-SCNC: 8 MMOL/L (ref 3–14)
B-HCG SERPL-ACNC: <1 IU/L (ref 0–5)
BUN SERPL-MCNC: 10 MG/DL (ref 7–30)
CALCIUM SERPL-MCNC: 8.4 MG/DL (ref 8.5–10.1)
CHLORIDE SERPL-SCNC: 107 MMOL/L (ref 94–109)
CO2 SERPL-SCNC: 23 MMOL/L (ref 20–32)
COLLECT DURATION TIME UR: 24 H
CREAT SERPL-MCNC: 0.78 MG/DL (ref 0.52–1.04)
ERYTHROCYTE [DISTWIDTH] IN BLOOD BY AUTOMATED COUNT: 12.5 % (ref 10–15)
GFR SERPL CREATININE-BSD FRML MDRD: 80 ML/MIN/1.7M2
GLUCOSE SERPL-MCNC: 108 MG/DL (ref 70–99)
HCT VFR BLD AUTO: 38.8 % (ref 35–47)
HGB BLD-MCNC: 13.4 G/DL (ref 11.7–15.7)
MCH RBC QN AUTO: 31.2 PG (ref 26.5–33)
MCHC RBC AUTO-ENTMCNC: 34.5 G/DL (ref 31.5–36.5)
MCV RBC AUTO: 90 FL (ref 78–100)
PLATELET # BLD AUTO: 216 10E9/L (ref 150–450)
POTASSIUM SERPL-SCNC: 3.7 MMOL/L (ref 3.4–5.3)
RBC # BLD AUTO: 4.29 10E12/L (ref 3.8–5.2)
SODIUM SERPL-SCNC: 138 MMOL/L (ref 133–144)
SPECIMEN VOL UR: 1040 ML
WBC # BLD AUTO: 6.1 10E9/L (ref 4–11)

## 2017-10-20 PROCEDURE — 4A03XB1 MEASUREMENT OF ARTERIAL PRESSURE, PERIPHERAL, EXTERNAL APPROACH: ICD-10-PCS | Performed by: INTERNAL MEDICINE

## 2017-10-20 PROCEDURE — 4A02XFZ MEASUREMENT OF CARDIAC RHYTHM, EXTERNAL APPROACH: ICD-10-PCS | Performed by: INTERNAL MEDICINE

## 2017-10-20 PROCEDURE — 93660 TILT TABLE EVALUATION: CPT | Mod: 26 | Performed by: INTERNAL MEDICINE

## 2017-10-20 PROCEDURE — 93660 TILT TABLE EVALUATION: CPT

## 2017-10-20 PROCEDURE — 85027 COMPLETE CBC AUTOMATED: CPT | Performed by: INTERNAL MEDICINE

## 2017-10-20 PROCEDURE — 80048 BASIC METABOLIC PNL TOTAL CA: CPT | Performed by: INTERNAL MEDICINE

## 2017-10-20 PROCEDURE — 4A00X2Z MEASUREMENT OF CENTRAL NERVOUS CONDUCTIVITY, EXTERNAL APPROACH: ICD-10-PCS | Performed by: INTERNAL MEDICINE

## 2017-10-20 PROCEDURE — 25000128 H RX IP 250 OP 636: Performed by: INTERNAL MEDICINE

## 2017-10-20 PROCEDURE — 95921 AUTONOMIC NRV PARASYM INERVJ: CPT | Mod: 26 | Performed by: INTERNAL MEDICINE

## 2017-10-20 PROCEDURE — 95921 AUTONOMIC NRV PARASYM INERVJ: CPT

## 2017-10-20 PROCEDURE — 40000166 ZZH STATISTIC PP CARE STAGE 1

## 2017-10-20 PROCEDURE — 25000132 ZZH RX MED GY IP 250 OP 250 PS 637: Performed by: INTERNAL MEDICINE

## 2017-10-20 PROCEDURE — 84702 CHORIONIC GONADOTROPIN TEST: CPT | Performed by: INTERNAL MEDICINE

## 2017-10-20 RX ORDER — OXYCODONE AND ACETAMINOPHEN 5; 325 MG/1; MG/1
1 TABLET ORAL ONCE
Status: COMPLETED | OUTPATIENT
Start: 2017-10-20 | End: 2017-10-20

## 2017-10-20 RX ORDER — NITROGLYCERIN 0.4 MG/1
0.4 TABLET SUBLINGUAL ONCE
Status: COMPLETED | OUTPATIENT
Start: 2017-10-20 | End: 2017-10-20

## 2017-10-20 RX ORDER — SODIUM CHLORIDE 9 MG/ML
INJECTION, SOLUTION INTRAVENOUS CONTINUOUS
Status: DISCONTINUED | OUTPATIENT
Start: 2017-10-20 | End: 2017-10-20 | Stop reason: HOSPADM

## 2017-10-20 RX ORDER — LIDOCAINE 40 MG/G
CREAM TOPICAL
Status: DISCONTINUED | OUTPATIENT
Start: 2017-10-20 | End: 2017-10-20 | Stop reason: HOSPADM

## 2017-10-20 RX ADMIN — NITROGLYCERIN 0.4 MG: 0.4 TABLET SUBLINGUAL at 13:52

## 2017-10-20 RX ADMIN — OXYCODONE HYDROCHLORIDE AND ACETAMINOPHEN 1 TABLET: 5; 325 TABLET ORAL at 14:37

## 2017-10-20 RX ADMIN — SODIUM CHLORIDE 600 ML: 9 INJECTION, SOLUTION INTRAVENOUS at 11:56

## 2017-10-20 NOTE — IP AVS SNAPSHOT
MRN:[id number]                      After Visit Summary   10/20/2017    Ada Welch    MRN: [id number]           Visit Information        Department      10/20/2017 11:03 AM [address] [facility]          Review of your medicines      UNREVIEWED medicines. Ask your doctor about these medicines        Dose / Directions    ADVAIR DISKUS 250-50 MCG/DOSE diskus inhaler   Generic drug:  fluticasone-salmeterol        Dose:  1 puff   Inhale 1 puff into the lungs 2 times daily as needed   Refills:  0       albuterol 108 (90 BASE) MCG/ACT Inhaler   Commonly known as:  PROAIR HFA/PROVENTIL HFA/VENTOLIN HFA        Dose:  2 puff   Inhale 2 puffs into the lungs   Refills:  0       amitriptyline 10 MG tablet   Commonly known as:  ELAVIL   Used for:  Chronic migraine without aura without status migrainosus, not intractable        Dose:  30 mg   Take 3 tablets (30 mg) by mouth At Bedtime   Quantity:  90 tablet   Refills:  2       buprenorphine 10 MCG/HR WK patch   Commonly known as:  BUTRANS   Used for:  Postlaminectomy syndrome of lumbar region        Dose:  1 patch   Place 1 patch onto the skin every 7 days   Quantity:  4 patch   Refills:  0       cetirizine 10 MG tablet   Commonly known as:  zyrTEC        Dose:  10 mg   Take 10 mg by mouth daily   Refills:  0       eletriptan 20 MG tablet   Commonly known as:  RELPAX   Used for:  Chronic daily headache, Intractable migraine without aura and with status migrainosus        Dose:  20 mg   Take 1 tablet (20 mg) by mouth at onset of headache for migraine May repeat in 2 hours. Max 4 tablets/24 hours.   Quantity:  18 tablet   Refills:  1       EPINEPHrine 0.3 MG/0.3ML injection 2-pack   Commonly known as:  EPIPEN/ADRENACLICK/or ANY BX GENERIC EQUIV        Dose:  0.3 mg   Inject 0.3 mg into the muscle   Refills:  0       etodolac 400 MG tablet   Commonly known as:  LODINE        Dose:  400 mg   Take 400 mg by mouth   Refills:  0       famotidine 20 MG tablet    Commonly known as:  PEPCID   Used for:  Hiatal hernia, Mast cell disorder        Dose:  20 mg   Take 1 tablet (20 mg) by mouth At Bedtime   Quantity:  90 tablet   Refills:  1       fluticasone 50 MCG/ACT spray   Commonly known as:  FLONASE        Dose:  2 spray   2 sprays   Refills:  0       ketamine (KETALAR) 5%-gabapentin (NEURONTIN) 8%-lidocaine 2.5% 5%-8% Gel topical PLO cream   Used for:  Postlaminectomy syndrome of lumbosacral region, Postlaminectomy syndrome of lumbar region        Dose:  1 g   Apply 1 g topically 3 times daily as needed   Quantity:  30 g   Refills:  3       meloxicam 7.5 MG tablet   Commonly known as:  MOBIC   Used for:  Chronic migraine without aura without status migrainosus, not intractable        Dose:  7.5 mg   Take 1 tablet (7.5 mg) by mouth daily   Quantity:  30 tablet   Refills:  1       metoclopramide 5 MG tablet   Commonly known as:  REGLAN   Used for:  Intractable chronic migraine without aura and with status migrainosus        Dose:  5 mg   Take 1 tablet (5 mg) by mouth every 6 hours as needed   Quantity:  20 tablet   Refills:  1       MULTIVITAMIN PO        Take by mouth daily   Refills:  0       omeprazole 20 MG CR capsule   Commonly known as:  priLOSEC   Used for:  Hiatal hernia        Dose:  20 mg   Take 1 capsule (20 mg) by mouth daily   Quantity:  90 capsule   Refills:  1       ondansetron 4 MG ODT tab   Commonly known as:  ZOFRAN-ODT   Used for:  Migraine without status migrainosus, not intractable, unspecified migraine type        Dose:  4-8 mg   Take 1-2 tablets (4-8 mg) by mouth every 12 hours as needed for nausea   Quantity:  60 tablet   Refills:  1       polyethylene glycol powder   Commonly known as:  MIRALAX/GLYCOLAX        Dose:  1 capful   1 capful Take 1 capful by mouth daily   Refills:  0       PROBIOTIC DAILY PO        Take by mouth 2 times daily 2 packets daily   Refills:  0       SUMAtriptan 6 MG/0.5ML pen injector kit   Commonly known as:  IMITREX STATDOSE    Used for:  Chronic migraine without aura without status migrainosus, not intractable        Dose:  6 mg   Inject 0.5 mLs (6 mg) Subcutaneous at onset of headache for migraine (may repeat once after 2 hrs) May repeat in 1 hour. Max 12 mg/24 hours.   Quantity:  2 kit   Refills:  1       SYNTHROID 25 MCG tablet   Generic drug:  levothyroxine        Dose:  25 mcg   Take 25 mcg by mouth daily   Refills:  0       tiZANidine 4 MG tablet   Commonly known as:  ZANAFLEX   Used for:  Post laminectomy syndrome        Dose:  8 mg   Take 2 tablets (8 mg) by mouth 3 times daily as needed for muscle spasms   Quantity:  90 tablet   Refills:  3       zonisamide 25 MG capsule   Commonly known as:  Zonegran   Used for:  Occipital neuritis        Take 1 tablet (25 mg) once daily for 1 week, then 2 tablets once daily for 1 week, then 3 tablets once daily for 1 week, then 4 tablets once daily for 1 week.   Quantity:  70 capsule   Refills:  3         CONTINUE these medicines which have NOT CHANGED        Dose / Directions    COMPRESSION STOCKINGS   Used for:  Orthostatic hypotension        Dose:  1 each   1 each daily 20-30 mmHg Thigh Length measure and fit, color and style per patient preference. Doff n jose per need.   Quantity:  3 each   Refills:  3                Protect others around you: Learn how to safely use, store and throw away your medicines at www.disposemymeds.org.         Follow-ups after your visit        Your next 10 appointments already scheduled     Oct 27, 2017  9:20 AM CDT   (Arrive by 9:05 AM)   Return Visit with Faiza Martinez MD   [facility] ([facility])    [address] [place] [address]   [phone]            Oct 30, 2017 10:00 AM CDT   (Arrive by 9:45 AM)   New Patient Visit with Jay Guajardo MD   [facility] ([place] [facility])    [address] [place] [address]  [facility] [address]   [phone]            Nov 01, 2017 10:30 AM CDT   NEW NEURO with Norm Batista MD   [facility] ([facility])    [address]  [address]  [address]  [facility] [address]   [phone]            Nov 02, 2017 11:00 AM CDT   (Arrive by 10:45 AM)   New Patient Visit with Pastora Fraga MD   [facility] Medicine [facility] Rehabilitation ([facility])    [address] [place] [address]  [address] [facility] [address]   [phone]            Nov 07, 2017  9:30 AM CST   (Arrive by 9:15 AM)   EMG with Jorge Pickard MD   [facility] EMG ([facility])    [address] [place] [address]   [phone]           Do not use lotions or creams on the area to be tested. If you are on blood thinners (Warfarin, Coumadin, Lovenox, etc), please contact your primary care physician to check if it is safe to stop them 3 days prior to testing. If you have anxiety, please check with your referring physician to obtain anti-anxiety medication for the procedure.            Nov 13, 2017 10:30 AM CST   (Arrive by 10:15 AM)   Return Visit with Ellyn Rivera MD   [facility] ([facility])    [address] [place] [address]  [address] [facility] [address]   [phone]            Nov 30, 2017  9:30 AM CST   (Arrive by 9:15 AM)   New Patient Visit with Bertha Moncada MD   [facility] Neurosurgery ([facility])    [address] [place] [address]  [address] [facility] [address]   [phone]            Dec 06, 2017 11:15 AM CST   (Arrive by 11:00 AM)   Return Visit with Wade Singh MD   [facility] Inst for Prostate and Urologic Cancers ([facility])    [address] [place] [address]  [address] [facility] [address]   [phone]            Dec 09, 2017  9:10 AM CST   (Arrive  "by 8:55 AM)   Return Visit with Seth Galeano MD   [facility] Neurology ([facility])    [address] [facility]  [address] [facility] [address] [phone]            Feb 05, 2018 11:30 AM CST   (Arrive by 11:15 AM)   RETURN ARRHYTHMIA with FIDELINA Blanc CNP   [facility] Care ([facility])    [address] [place] [address] [phone]               Care Instructions        Further instructions from your care team               After you go home:    Have an adult stay with you for 24 hours.    Drink plenty of fluids.        You may eat your normal diet.    [facility] [place]:  [phone] (7 days a week)                       Additional Information About Your Visit        MyChart Information     [facility] gives you secure access to your electronic health record. If you see a primary care provider, you can also send messages to your care team and make appointments. If you have questions, please call your primary care clinic.  If you do not have a primary care provider, please call [phone] and they will assist you.        Care EveryWhere ID     This is your Care EveryWhere ID. This could be used by other organizations to access your [facility] medical records  [id number]        Your Vitals Were     Blood Pressure Pulse Temperature Height Weight Pulse Oximetry    102/68 (BP Location: Right arm) 94 97.7  F (36.5  C) (Oral) 1.702 m (5' 7\") 90.9 kg (200 lb 6.4 oz) 98%    BMI (Body Mass Index)                   31.39 kg/m2            Primary Care Provider Office Phone # Fax #    Ellyn Rivera [phone]      Equal Access to Services     [facility]: Hadii kia Slater, waaxda luqadaha, qaybta kaalmada eileen, odette tang. So [facility] [phone].    ATENCIÓN: Si habla español, tiene a sweeney disposición " servicios gratuitos de asistencia lingüística. Isaías carr [phone].    We comply with applicable federal civil rights laws and [place] laws. We do not discriminate on the basis of race, color, national origin, age, disability, sex, sexual orientation, or gender identity.            Thank you!     Thank you for choosing [facility] for your care. Our goal is always to provide you with excellent care. Hearing back from our patients is one way we can continue to improve our services. Please take a few minutes to complete the written survey that you may receive in the mail after you visit with us. Thank you!             Medication List: This is a list of all your medications and when to take them. Check marks below indicate your daily home schedule. Keep this list as a reference.      Medications           Morning Afternoon Evening Bedtime As Needed    ADVAIR DISKUS 250-50 MCG/DOSE diskus inhaler   Inhale 1 puff into the lungs 2 times daily as needed   Generic drug:  fluticasone-salmeterol                                albuterol 108 (90 BASE) MCG/ACT Inhaler   Commonly known as:  PROAIR HFA/PROVENTIL HFA/VENTOLIN HFA   Inhale 2 puffs into the lungs                                amitriptyline 10 MG tablet   Commonly known as:  ELAVIL   Take 3 tablets (30 mg) by mouth At Bedtime                                buprenorphine 10 MCG/HR WK patch   Commonly known as:  BUTRANS   Place 1 patch onto the skin every 7 days                                cetirizine 10 MG tablet   Commonly known as:  zyrTEC   Take 10 mg by mouth daily                                COMPRESSION STOCKINGS   1 each daily 20-30 mmHg Thigh Length measure and fit, color and style per patient preference. Doshar n jose per need.                                eletriptan 20 MG tablet   Commonly known as:  RELPAX   Take 1 tablet (20 mg) by mouth at onset of headache for migraine May repeat in 2 hours. Max 4 tablets/24 hours.                                 EPINEPHrine 0.3 MG/0.3ML injection 2-pack   Commonly known as:  EPIPEN/ADRENACLICK/or ANY BX GENERIC EQUIV   Inject 0.3 mg into the muscle                                etodolac 400 MG tablet   Commonly known as:  LODINE   Take 400 mg by mouth                                famotidine 20 MG tablet   Commonly known as:  PEPCID   Take 1 tablet (20 mg) by mouth At Bedtime                                fluticasone 50 MCG/ACT spray   Commonly known as:  FLONASE   2 sprays                                ketamine (KETALAR) 5%-gabapentin (NEURONTIN) 8%-lidocaine 2.5% 5%-8% Gel topical PLO cream   Apply 1 g topically 3 times daily as needed                                meloxicam 7.5 MG tablet   Commonly known as:  MOBIC   Take 1 tablet (7.5 mg) by mouth daily                                metoclopramide 5 MG tablet   Commonly known as:  REGLAN   Take 1 tablet (5 mg) by mouth every 6 hours as needed                                MULTIVITAMIN PO   Take by mouth daily                                omeprazole 20 MG CR capsule   Commonly known as:  priLOSEC   Take 1 capsule (20 mg) by mouth daily                                ondansetron 4 MG ODT tab   Commonly known as:  ZOFRAN-ODT   Take 1-2 tablets (4-8 mg) by mouth every 12 hours as needed for nausea                                polyethylene glycol powder   Commonly known as:  MIRALAX/GLYCOLAX   1 capful Take 1 capful by mouth daily                                PROBIOTIC DAILY PO   Take by mouth 2 times daily 2 packets daily                                SUMAtriptan 6 MG/0.5ML pen injector kit   Commonly known as:  IMITREX STATDOSE   Inject 0.5 mLs (6 mg) Subcutaneous at onset of headache for migraine (may repeat once after 2 hrs) May repeat in 1 hour. Max 12 mg/24 hours.                                SYNTHROID 25 MCG tablet   Take 25 mcg by mouth daily   Generic drug:  levothyroxine                                tiZANidine 4 MG tablet   Commonly known as:   ZANAFLEX   Take 2 tablets (8 mg) by mouth 3 times daily as needed for muscle spasms                                zonisamide 25 MG capsule   Commonly known as:  Zonegran   Take 1 tablet (25 mg) once daily for 1 week, then 2 tablets once daily for 1 week, then 3 tablets once daily for 1 week, then 4 tablets once daily for 1 week.

## 2017-10-20 NOTE — IP AVS SNAPSHOT
[address] [address]                                       After Visit Summary   10/20/2017    Ada Welch    MRN: [id number]           After Visit Summary Signature Page     I have received my discharge instructions, and my questions have been answered. I have discussed any challenges I see with this plan with the nurse or doctor.    ..........................................................................................................................................  Patient/Patient Representative Signature      ..........................................................................................................................................  Patient Representative Print Name and Relationship to Patient    ..................................................               ................................................  Date                                            Time    ..........................................................................................................................................  Reviewed by Signature/Title    ...................................................              ..............................................  Date                                                            Time

## 2017-10-20 NOTE — DISCHARGE INSTRUCTIONS
After you go home:    Have an adult stay with you for 24 hours.    Drink plenty of fluids.        You may eat your normal diet.    [facility] [place]:  [phone] (7 days a week)

## 2017-10-20 NOTE — PROGRESS NOTES
Discharge instructions given and pt voiced understanding. No scripts needed from pharmacy. Up walking in room. Complaining of back and head discomfort. Per patient this is baseline. Belongings packed by family. Adequate for discharge. Discharged to home with family.

## 2017-10-20 NOTE — PROGRESS NOTES
Pt has returned to unit 2a s/p tilt table study. VSS Pt c/o chronic back pain and also headache. Dr Russell at bedside. Pt medicated with 1 tab percocet. MD to check in with pt in 30-45 minutes. Pt tolerating PO.

## 2017-10-20 NOTE — OP NOTE
EP PROCEDURE NOTE    *Procedures:*    1. TILT table test.  2. Autonomic function test.       *Cardiologist:*  Kirk Russell    *EP Fellow:*  Anthony Patino MD    *Referring MD: *  Dr Morgan Tello    *Pre-operative Diagnosis:*  Syncope.    *Complications:*  None.     *Medications:*  NTG 0.4mg SL.     *Clinical Profile:*  45 yo female referred by Dr Tello with suspected autonomic dysfunction. Ada has a complex history including Chiari malformation, E-D syndrome. Lightheadedness and syncope with premonitory symptoms suggesting VVS.    The patient is here for autonomic testing including tilt testing.     Please see Cardiol clinic visit 10/05/17 note  for details.      PROCEDURE    The risks and benefits of the procedure were explained to the patient in   full. Written informed consent was obtained. The patient was brought to the   EP lab in a fasting and hemodynamically stable condition. Physical   examination of the patient did not reveal any carotid bruits, and review of   the chart did not reveal the presence of stroke or TIA within the past   three months.     The following maneuvers were done sequentially:    1- Sitting for 5 minutes:   End of 5 min:  HR 83 , /74    2- Standing for 10 minutes:   30 sec: HR 94 , /75  1 min: HR 97 , /74  2 min: HR 94 , /73  3 min:  , .79  4 min:  , /75  5 min:  , /75  6 min:  , /74  7 min:  , /76  8 min: HR 97 , /80  9 min:  , /78  10 min:  , /83  No sy,ptoms    2- Maneuvers in seated position:    A. Carotid sinus massage:  Not done due to neck collar    Symptoms: N/A    B. Valsalva:   Baseline: HR 87 , /60  Phase 1: HR 84 , Max /94  Phase 2: HR 89 , Min /93  Phase 3: Present  Phase 4 (Overshoot): HR 70 , Max .89    Symptoms: None    C. Cough:   Baseline HR 85 /75  1) Cough x 2: HR 84 BP 94/53  Symptoms: None    Baseline  HR 84  /80  2)  Cough x multiple:  , BP 78/45  Symptoms: None    3- Supine rest for 5 minutes:   HR 74 , /71    4- TILT at 70 degrees for 20 minutes: (1 liter fluid was given before tilt)  30 sec HR 85  /65  1 min: HR 92 , /66  2 min: HR 89 , /68  3 min: HR 99 , /69  4 min: HR 95 , /69  5 min: HR 97 , /67  6 min: HR 99 , /75  7 min:  , /68  8 min:  , /69  9 min:  , /71  10 min:  , /72  12 min: HR 97 , /68  14 min: HR 97 , /75  16 min:  , /68  18 min:  , /69  20 min:  , /82  No symptoms    5- NTG 0.4mg SL and monitoring for 5 minutes:   5min  Eliud BP 94/64 mmHg,   bpm.   Symptoms: None, but later developed Headache      DISCUSSION:  Ada is a pleasant 45 yo woman with multiple co-morbidities as noted above.  The study today was essentially normal and I discussed this with Ada and her . For now, we will treat her pallor/lightheaded spells with increase salt in diet and electrolyte - volume . I will follow in clinic and adjust as needed. She voiced understanding and agreement.    I appreciated the opportunity to see and assess Mrs Ada Welch and direct the procedure described above with EP Fellow Dr Patino.     I agree with the above note which summarizes my findings and current recommendations. Please do not hesitate to contact me if you have any questions or concerns.    Kirk Russell MD [facility]   Pager [phone]  Office [phone]

## 2017-10-23 DIAGNOSIS — R39.15 URINARY URGENCY: Primary | ICD-10-CM

## 2017-10-24 ENCOUNTER — CARE COORDINATION (OUTPATIENT)
Dept: CARDIOLOGY | Facility: CLINIC | Age: 47
End: 2017-10-24

## 2017-10-25 LAB
COLLECT DURATION TIME UR: 24 H
CREAT SERPL-MCNC: 81 MG/DL
CREAT UR-MCNC: 87 MG/DL
HISTAMINE UR-SCNC: 1031 NMOL/L
HISTAMINE/CREAT 24H UR-SRTO: 1273 NMOL/G CRT (ref 0–450)
ME-HISTAMINE/CREAT 24H UR: 189 MCG/G CR (ref 30–200)
SPECIMEN VOL 24H UR: 1040 ML

## 2017-10-25 ASSESSMENT — ENCOUNTER SYMPTOMS
WEAKNESS: 1
EYE IRRITATION: 1
SEIZURES: 0
EXERCISE INTOLERANCE: 1
MYALGIAS: 1
SNORES LOUDLY: 0
STIFFNESS: 1
NECK MASS: 1
HYPERTENSION: 0
EYE REDNESS: 1
DYSPNEA ON EXERTION: 1
CHILLS: 1
SLEEP DISTURBANCES DUE TO BREATHING: 1
ARTHRALGIAS: 1
TASTE DISTURBANCE: 1
NAIL CHANGES: 0
SINUS PAIN: 0
HALLUCINATIONS: 0
ALTERED TEMPERATURE REGULATION: 1
WEIGHT LOSS: 0
RESPIRATORY PAIN: 0
MEMORY LOSS: 1
NUMBNESS: 1
HEMOPTYSIS: 0
FLANK PAIN: 1
SPUTUM PRODUCTION: 1
DIFFICULTY URINATING: 1
TINGLING: 1
SMELL DISTURBANCE: 0
VOMITING: 0
LIGHT-HEADEDNESS: 1
MUSCLE CRAMPS: 1
NECK PAIN: 1
HYPOTENSION: 1
FEVER: 0
BLOOD IN STOOL: 1
PARALYSIS: 0
NAUSEA: 1
JOINT SWELLING: 1
POSTURAL DYSPNEA: 1
TREMORS: 0
WHEEZING: 1
DISTURBANCES IN COORDINATION: 0
DOUBLE VISION: 1
SORE THROAT: 0
LEG SWELLING: 1
HEADACHES: 1
TROUBLE SWALLOWING: 1
INCREASED ENERGY: 1
HOARSE VOICE: 1
SWOLLEN GLANDS: 1
BLOATING: 1
POOR WOUND HEALING: 0
SPEECH CHANGE: 0
CONSTIPATION: 1
HEMATURIA: 0
POLYDIPSIA: 1
BRUISES/BLEEDS EASILY: 1
TACHYCARDIA: 1
RECTAL PAIN: 0
COUGH DISTURBING SLEEP: 0
SKIN CHANGES: 0
DYSURIA: 1
SINUS CONGESTION: 1
DIARRHEA: 1
NIGHT SWEATS: 0
SYNCOPE: 1
WEIGHT GAIN: 0
LOSS OF CONSCIOUSNESS: 1
POLYPHAGIA: 0
HEARTBURN: 1
DIZZINESS: 1
EYE WATERING: 1
ABDOMINAL PAIN: 1
LEG PAIN: 1
BOWEL INCONTINENCE: 0
COUGH: 0
PALPITATIONS: 1
ORTHOPNEA: 1
RECTAL BLEEDING: 1
MUSCLE WEAKNESS: 1
DECREASED APPETITE: 1
CLAUDICATION: 1
EYE PAIN: 0
BACK PAIN: 1
SHORTNESS OF BREATH: 1
FATIGUE: 1
JAUNDICE: 0

## 2017-10-25 ASSESSMENT — ANXIETY QUESTIONNAIRES
7. FEELING AFRAID AS IF SOMETHING AWFUL MIGHT HAPPEN: NOT AT ALL
1. FEELING NERVOUS, ANXIOUS, OR ON EDGE: NOT AT ALL
GAD7 TOTAL SCORE: 0
2. NOT BEING ABLE TO STOP OR CONTROL WORRYING: NOT AT ALL
GAD7 TOTAL SCORE: 0
3. WORRYING TOO MUCH ABOUT DIFFERENT THINGS: NOT AT ALL
6. BECOMING EASILY ANNOYED OR IRRITABLE: NOT AT ALL
4. TROUBLE RELAXING: NOT AT ALL
7. FEELING AFRAID AS IF SOMETHING AWFUL MIGHT HAPPEN: NOT AT ALL
5. BEING SO RESTLESS THAT IT IS HARD TO SIT STILL: NOT AT ALL
GAD7 TOTAL SCORE: 0

## 2017-10-26 ASSESSMENT — ANXIETY QUESTIONNAIRES: GAD7 TOTAL SCORE: 0

## 2017-10-26 NOTE — PROGRESS NOTES
Follow up from tilt study. Normal study.   Follow up scheduled  Labs reviewed, released.   Will discuss with dr Russell when he returns.

## 2017-10-27 ENCOUNTER — OFFICE VISIT (OUTPATIENT)
Dept: ANESTHESIOLOGY | Facility: CLINIC | Age: 47
End: 2017-10-27

## 2017-10-27 VITALS
HEIGHT: 67 IN | BODY MASS INDEX: 31.45 KG/M2 | HEART RATE: 10 BPM | WEIGHT: 200.4 LBS | SYSTOLIC BLOOD PRESSURE: 108 MMHG | DIASTOLIC BLOOD PRESSURE: 71 MMHG | OXYGEN SATURATION: 95 %

## 2017-10-27 DIAGNOSIS — M96.1 LUMBAR POST-LAMINECTOMY SYNDROME: Primary | ICD-10-CM

## 2017-10-27 DIAGNOSIS — R39.15 URINARY URGENCY: ICD-10-CM

## 2017-10-27 LAB
ALBUMIN UR-MCNC: NEGATIVE MG/DL
APPEARANCE UR: ABNORMAL
BILIRUB UR QL STRIP: NEGATIVE
COLOR UR AUTO: ABNORMAL
GLUCOSE UR STRIP-MCNC: NEGATIVE MG/DL
HGB UR QL STRIP: NEGATIVE
KETONES UR STRIP-MCNC: NEGATIVE MG/DL
LEUKOCYTE ESTERASE UR QL STRIP: NEGATIVE
MUCOUS THREADS #/AREA URNS LPF: PRESENT /LPF
NITRATE UR QL: NEGATIVE
PH UR STRIP: 5 PH (ref 5–7)
RBC #/AREA URNS AUTO: 3 /HPF (ref 0–2)
SOURCE: ABNORMAL
SP GR UR STRIP: 1.02 (ref 1–1.03)
SQUAMOUS #/AREA URNS AUTO: 15 /HPF (ref 0–1)
TRANS CELLS #/AREA URNS HPF: 1 /HPF
UROBILINOGEN UR STRIP-MCNC: 2 MG/DL (ref 0–2)
WBC #/AREA URNS AUTO: 32 /HPF (ref 0–2)

## 2017-10-27 RX ORDER — CELECOXIB 100 MG/1
100 CAPSULE ORAL DAILY
Qty: 30 CAPSULE | Refills: 1 | Status: SHIPPED | OUTPATIENT
Start: 2017-10-27 | End: 2017-11-01

## 2017-10-27 RX ORDER — TRAMADOL HYDROCHLORIDE 50 MG/1
50 TABLET ORAL EVERY 6 HOURS PRN
Qty: 30 TABLET | Refills: 0 | Status: SHIPPED | OUTPATIENT
Start: 2017-10-27 | End: 2018-02-07

## 2017-10-27 ASSESSMENT — PAIN SCALES - GENERAL: PAINLEVEL: SEVERE PAIN (7)

## 2017-10-27 NOTE — PATIENT INSTRUCTIONS
1. Stop taking meloxicam.     2. Start taking Celebrex 100mg daily. Do not take other non-steroidal anti-inflammatory medications while taking Celebrex.     3. Consider occipital nerve block in 2-3 months.    4. Tramadol refill given today in clinic.     Follow up: 2-3 months or as needed.       To speak with a nurse, schedule/reschedule/cancel a clinic appointment, or request a medication refill call: [phone]     You can also reach us by [employer]: [email or web address]    For refills, please call on Monday, 1 week before your medication runs out. The doctors are not always in clinic, so this gives us time to get your prescriptions ready.  Please let us know the name of the medication you are requesting a refill of.

## 2017-10-27 NOTE — LETTER
10/27/2017       RE: Ada Welch  [address] [place] [address]     Dear Colleague,    Thank you for referring your patient, Ada Welch, to the [facility] at [facility]. Please see a copy of my visit note below.     [facility] : Progress Note    Date of visit: 10/27/2017    Interval history:  Ada Welch is a 46 year old female  is known to me for Data Unavailable.Ada Welch is a 46 year old female with past medical history significant for Ehler Danlos syndrome is known to me for chronic  low back pain, which is mostly located at lower lumbar area. The patient has very complex history of low back pain for nearly 24 years for which she underwent L4-S1 fusion (TLIF) 12/09/2013.  Postoperatively her  back pain even became much worse than preop.  Mainly LBP, but also radiates down both legs S1 dermatome distribution.   She then had  removal of posterior instrumentation (screws and guadalupe) with exploration of fusion.  Fusion noted to already be solid.  NO significant relief afforded by this surgery. The pain has been progressively worsened, limiting the patient s activities, affecting mood and sleep quality, and causing a decrease in the patient s quality of life.  She underwent multiple SUNSHINE's which were not helpful, and even made things worse.  Recently started aquatic based physical therapy. Previous EMG showed some L5 radiculopathy on left. Her pain significantly improved after we started her butrans patch. She continues to be more functional than before the butrans was prescribed. She is able to walk longer distances and do activities with less pain that before. She has numbness in her left 4th toe that has been constant since her surgery. She denies any significant side effects from the medications. She also denies any new bowel or bladder issues,  "new weakness or numbness.      Today she came for f/u visit. She admitted in the hospital recently with intractable migraine headache. At the time she had  MRI/MRA of the brain, a venogram and an MRI of the cervical spine. These studies were  notable for mild to moderate degenerative changes from C4-C7. She had Moderate loss of disc height is some loss of increased T2 signal of the disc at C5-6. Small broad-based disc bulge. Moderate right neuroforaminal narrowing noted at this level.     Recommendations/plan at the last visit included:  - Interventions: None at this time.  SUNSHINE's and SCS and other options have been discussed with her but this would be a last resort as she feels her body \"rejects foreign objects\". Discussed about Botox injection for migraine headache if conservative therapies fail.   - Medications:  getting excellent benefit with butrans at this time and is on a stable dose.    -  Continue tizanidine 8mg to help with her muscle spasms.  Continue ibuprofen and ACETAMINOPHEN prn.  - Take Amitriptyline 10 mg  at bedtime; the dose may be increased to 20 mg at bedtime.  - Physical Therapy:  Continue as scheduled - she also does aquatic therapy - she will continue her HEP  - Psychology/Coping skills: continue therapy in [place]  - Diagnostic Studies:  None today     Follow up: 3 months        Since the last visit, Ada Welch reports:  - she tried medical marijuana but didn't get any benefit  - She went to [facility] to see a neurosurgeon familiar of EDS. She had standing MRI performed. The reports are not available    -  Major issues are: intractable headache. Taking multiple medications and but not sure if they are working. Also taking butrans patch, tramadol prn, tizanidine, and meloxicam.     - SUNSHINE procedure response: The patient reported 'bad reaction' with SUNSHINE. Not interested in further procedures.       [place] Prescription Monitoring Program:   Reviewed. No concerns    Review of " "Systems:  The 14 system ROS was reviewed and was negative except what is documented above and as follows.  Any bowel or bladder problems: none  Mood: okay    Physical Exam:  Vitals:    10/27/17 0933   BP: 108/71   Pulse: (!) 10   SpO2: 95%   Weight: 90.9 kg (200 lb 6.4 oz)   Height: 1.702 m (5' 7\")       General: Awake in no apparent distress. This patient is accompanied in the office by patient's mom.  Eyes: Sclerae are anicteric. PERRLA, EOMI   Neck: soft C-collar in place  Head: tender to palpation in the occipital region  Lungs: unlabored.   Heart: regular rate and rhythm   Abdomen: soft non tender.  Extremities: Pulses are well palpable, no peripheral edema.   Musculoskeletal: All muscle groups are normal in bulk and tone. The patient changes position without pain behavior. The patient walks with a normal gait. Posture is normal. Muscle strength was rated at 5/5 in all groups in the extremities. Examination of the joints reveals preserved range of motion.  Neurologic exam:Strength 5/5 for bilateral grasp, finger abduction, wrist extension, elbow flexion, elbow extension, shoulder abduction.  Strength 5/5 for bilateral dorsiflexion, plantarflexion, great toe extension, knee extension, hip flexion. Sensation intact throughout all dermatomes bilateral upper extremities and lower extremities  Psychiatric; Normal affect.   Skin: Warm and Dry.    Medications:  Current Outpatient Prescriptions   Medication Sig Dispense Refill     meloxicam (MOBIC) 7.5 MG tablet Take 1 tablet (7.5 mg) by mouth daily 30 tablet 1     zonisamide (ZONEGRAN) 25 MG capsule Take 1 tablet (25 mg) once daily for 1 week, then 2 tablets once daily for 1 week, then 3 tablets once daily for 1 week, then 4 tablets once daily for 1 week. 70 capsule 3     buprenorphine (BUTRANS) 10 MCG/HR WK patch Place 1 patch onto the skin every 7 days 4 patch 0     eletriptan (RELPAX) 20 MG tablet Take 1 tablet (20 mg) by mouth at onset of headache for migraine May " repeat in 2 hours. Max 4 tablets/24 hours. 18 tablet 1     levothyroxine (SYNTHROID) 25 MCG tablet Take 25 mcg by mouth daily       amitriptyline (ELAVIL) 10 MG tablet Take 3 tablets (30 mg) by mouth At Bedtime 90 tablet 2     famotidine (PEPCID) 20 MG tablet Take 1 tablet (20 mg) by mouth At Bedtime 90 tablet 1     omeprazole (PRILOSEC) 20 MG CR capsule Take 1 capsule (20 mg) by mouth daily 90 capsule 1     ondansetron (ZOFRAN-ODT) 4 MG ODT tab Take 1-2 tablets (4-8 mg) by mouth every 12 hours as needed for nausea 60 tablet 1     metoclopramide (REGLAN) 5 MG tablet Take 1 tablet (5 mg) by mouth every 6 hours as needed 20 tablet 1     tiZANidine (ZANAFLEX) 4 MG tablet Take 2 tablets (8 mg) by mouth 3 times daily as needed for muscle spasms 90 tablet 3     EPINEPHrine (EPIPEN/ADRENACLICK/OR ANY BX GENERIC EQUIV) 0.3 MG/0.3ML injection 2-pack Inject 0.3 mg into the muscle       albuterol (PROAIR HFA/PROVENTIL HFA/VENTOLIN HFA) 108 (90 BASE) MCG/ACT Inhaler Inhale 2 puffs into the lungs       fluticasone (FLONASE) 50 MCG/ACT spray 2 sprays       etodolac (LODINE) 400 MG tablet Take 400 mg by mouth       COMPRESSION STOCKINGS 1 each daily 20-30 mmHg Thigh Length measure and fit, color and style per patient preference. Doff n jose per need. 3 each 3     SUMAtriptan (IMITREX STATDOSE) 6 MG/0.5ML pen injector kit Inject 0.5 mLs (6 mg) Subcutaneous at onset of headache for migraine (may repeat once after 2 hrs) May repeat in 1 hour. Max 12 mg/24 hours. 2 kit 1     ketamine, KETALAR, 5%-gabapentin, NEURONTIN, 8%-lidocaine 2.5% 5%-8% GEL topical PLO cream Apply 1 g topically 3 times daily as needed 30 g 3     fluticasone-salmeterol (ADVAIR DISKUS) 250-50 MCG/DOSE diskus inhaler Inhale 1 puff into the lungs 2 times daily as needed        cetirizine (ZYRTEC) 10 MG tablet Take 10 mg by mouth daily        polyethylene glycol (MIRALAX/GLYCOLAX) powder 1 capful Take 1 capful by mouth daily       Multiple Vitamins-Minerals  "(MULTIVITAMIN PO) Take by mouth daily       Probiotic Product (PROBIOTIC DAILY PO) Take by mouth 2 times daily 2 packets daily           LABORATORY VALUES:   Recent Labs   Lab Test  10/20/17   1150  07/24/17   1518   NA  138  141   POTASSIUM  3.7  4.0   CHLORIDE  107  110*   CO2  23  27   ANIONGAP  8  5   GLC  108*  88   BUN  10  8   CR  0.78  0.85   JUAN  8.4*  8.9       CBC RESULTS:   Recent Labs   Lab Test  10/20/17   1150   WBC  6.1   RBC  4.29   HGB  13.4   HCT  38.8   MCV  90   MCH  31.2   MCHC  34.5   RDW  12.5   PLT  216       Most Recent 3 INR's:  Recent Labs   Lab Test  07/25/17   1249   INR  1.01       Diagnostic tests:            ASSESSMENT:    - Chronic migraine without aura without status migrainosus, not intractable  - Postlaminectomy syndrome of lumbar region       PLAN:    - Interventions: Will schedule her for occipital blocks.  SUNSHINE's and SCS and other options have been discussed with her but this would be a last resort as she feels her body \"rejects foreign objects\". Discussed about Botox injection for migraine headache if conservative therapies fail.   - Medications:  getting excellent benefit with butrans at this time and is on a stable dose.    -  Continue tizanidine 8mg to help with her muscle spasms.  Continue ibuprofen and ACETAMINOPHEN prn.  - Take Amitriptyline 30 mg at bedtime.  - Physical Therapy:  Continue as scheduled - she also does aquatic therapy - she will continue her HEP  - Psychology/Coping skills: continue therapy in [place]  - Diagnostic Studies:  None today     Follow up: 3 months          Again, thank you for allowing me to participate in the care of your patient.      Sincerely,    Faiza Martinez MD      "

## 2017-10-27 NOTE — NURSING NOTE
AVS given and reviewed with pt.  Pt verbalized understanding and declined any questions.     Jessica De Souza, RN, BSN

## 2017-10-27 NOTE — MR AVS SNAPSHOT
After Visit Summary   10/27/2017    Ada Welch    MRN: [id number]           Patient Information     Date Of Birth          1970        Visit Information        Provider Department      10/27/2017 9:20 AM Faiza Martinez MD [facility]        Care Instructions    1. Stop taking meloxicam.     2. Start taking Celebrex 100mg daily. Do not take other non-steroidal anti-inflammatory medications while taking Celebrex.     3. Repeat occipital nerve block in 2-3 months.    4. Tramadol refill given today in clinic.     Follow up: 2-3 months or as needed.       To speak with a nurse, schedule/reschedule/cancel a clinic appointment, or request a medication refill call: [phone]     You can also reach us by [employer]: [email or web address]    For refills, please call on Monday, 1 week before your medication runs out. The doctors are not always in clinic, so this gives us time to get your prescriptions ready.  Please let us know the name of the medication you are requesting a refill of.                                     Follow-ups after your visit        Your next 10 appointments already scheduled     Oct 30, 2017 10:00 AM CDT   (Arrive by 9:45 AM)   New Patient Visit with Jay Guajardo MD   [facility] ([facility])    [address]  4th [facility] [address] [phone]            Nov 01, 2017 10:30 AM CDT   NEW NEURO with Norm Batista MD   [facility] ([facility])    Darnell Tracy [address]  [address] [address]  [facility] [address]   [phone]            Nov 02, 2017 11:00 AM CDT   (Arrive by 10:45 AM)   New Patient Visit with Pastora Fraga MD   [facility] ([facility])    [address]  3rd Floor  [facility] [address] [phone]             Nov 07, 2017  9:30 AM CST   (Arrive by 9:15 AM)   EMG with Jorge Pickard MD   [facility] ([facility])    [address] [place] [id number] [phone]           Do not use lotions or creams on the area to be tested. If you are on blood thinners (Warfarin, Coumadin, Lovenox, etc), please contact your primary care physician to check if it is safe to stop them 3 days prior to testing. If you have anxiety, please check with your referring physician to obtain anti-anxiety medication for the procedure.            Nov 13, 2017 10:30 AM CST   (Arrive by 10:15 AM)   Return Visit with Ellyn Rivera MD   [facility] ([facility])    [address] [place] [address] [phone]            Nov 30, 2017  9:30 AM CST   (Arrive by 9:15 AM)   New Patient Visit with Bertha Moncada MD   [facility] Neuro[facility] ([facility])    [address] [place] [address] [phone]            Dec 06, 2017 11:15 AM CST   (Arrive by 11:00 AM)   Return Visit with Wade Singh MD   [facility] Prostate [facility] Urologic C[facility] ([facility])    [address] [place] [id number] [phone]            Dec 09, 2017  9:10 AM CST   (Arrive by 8:55 AM)   Return Visit with Seth Galeano MD   [facility] ([facility])    [address] [place] [id number] [phone]            Feb 05, 2018 11:00 AM CST   (Arrive by 10:45 AM)   Implantable Loop Recorder with Uc Cv Device 1   [facility] ([facility])    [address] [place] [address]   [phone]            Feb 05, 2018 11:30 AM CST   (Arrive by 11:15 AM)   RETURN ARRHYTHMIA with FIDELINA Blanc  "[facility] ([facility])    [address] [facility]  3rd [facility] [address] [phone]              Who to contact     Please call your clinic at [phone] to:    Ask questions about your health    Make or cancel appointments    Discuss your medicines    Learn about your test results    Speak to your doctor   If you have compliments or concerns about an experience at your clinic, or if you wish to file a complaint, please contact [facility] Patient Relations at [phone] or email us at [email or web address]         Additional Information About Your Visit        [facility] Information     [employer] gives you secure access to your electronic health record. If you see a primary care provider, you can also send messages to your care team and make appointments. If you have questions, please call your primary care clinic.  If you do not have a primary care provider, please call [phone] and they will assist you.      [employer] is an electronic gateway that provides easy, online access to your medical records. With [employer], you can request a clinic appointment, read your test results, renew a prescription or communicate with your care team.     To access your existing account, please contact your [facility] Clinic or call [phone] for assistance.        Care EveryWhere ID     This is your Care EveryWhere ID. This could be used by other organizations to access your [facility] medical records  [id number]        Your Vitals Were     Pulse Height Pulse Oximetry BMI (Body Mass Index)          10 1.702 m (5' 7\") 95% 31.39 kg/m2         Blood Pressure from Last 3 Encounters:   10/27/17 108/71   10/20/17 102/68   10/05/17 (!) 137/93    Weight from Last 3 Encounters:   10/27/17 90.9 kg (200 lb 6.4 oz)   10/20/17 90.9 kg (200 lb 6.4 oz)   10/05/17 90.9 kg (200 lb 6.4 oz)              Today, you had the following  "    No orders found for display       Primary Care Provider Office Phone # Fax #    [facility] [phone]       [address] [place] [address] [address] [place] [id number]        Equal Access to Services     CHRISTINA GUY : Hadchantel kia gamboa demetriuso Sonegrito, waaxda luqadaha, qaybta kaalmada adeegyada, odette chino donis tang. So [facility] [phone].    ATENCIÓN: Si habla español, tiene a sweeney disposición servicios gratuitos de asistencia lingüística. [facility] [phone].    We comply with applicable federal civil rights laws and [place] laws. We do not discriminate on the basis of race, color, national origin, age, disability, sex, sexual orientation, or gender identity.            Thank you!     Thank you for choosing [facility]  for your care. Our goal is always to provide you with excellent care. Hearing back from our patients is one way we can continue to improve our services. Please take a few minutes to complete the written survey that you may receive in the mail after your visit with us. Thank you!             Your Updated Medication List - Protect others around you: Learn how to safely use, store and throw away your medicines at www.disposemymeds.org.          This list is accurate as of: 10/27/17 10:27 AM.  Always use your most recent med list.                   Brand Name Dispense Instructions for use Diagnosis    ADVAIR DISKUS 250-50 MCG/DOSE diskus inhaler   Generic drug:  fluticasone-salmeterol      Inhale 1 puff into the lungs 2 times daily as needed        albuterol 108 (90 BASE) MCG/ACT Inhaler    PROAIR HFA/PROVENTIL HFA/VENTOLIN HFA     Inhale 2 puffs into the lungs        amitriptyline 10 MG tablet    ELAVIL    90 tablet    Take 3 tablets (30 mg) by mouth At Bedtime    Chronic migraine without aura without status migrainosus, not intractable       buprenorphine 10 MCG/HR WK patch    BUTRANS    4 patch    Place 1 patch onto the skin  every 7 days    Postlaminectomy syndrome of lumbar region       cetirizine 10 MG tablet    zyrTEC     Take 10 mg by mouth daily        COMPRESSION STOCKINGS     3 each    1 each daily 20-30 mmHg Thigh Length measure and fit, color and style per patient preference. Antolin garcia per need.    Orthostatic hypotension       eletriptan 20 MG tablet    RELPAX    18 tablet    Take 1 tablet (20 mg) by mouth at onset of headache for migraine May repeat in 2 hours. Max 4 tablets/24 hours.    Chronic daily headache, Intractable migraine without aura and with status migrainosus       EPINEPHrine 0.3 MG/0.3ML injection 2-pack    EPIPEN/ADRENACLICK/or ANY BX GENERIC EQUIV     Inject 0.3 mg into the muscle        etodolac 400 MG tablet    LODINE     Take 400 mg by mouth        famotidine 20 MG tablet    PEPCID    90 tablet    Take 1 tablet (20 mg) by mouth At Bedtime    Hiatal hernia, Mast cell disorder       fluticasone 50 MCG/ACT spray    FLONASE     2 sprays        ketamine (KETALAR) 5%-gabapentin (NEURONTIN) 8%-lidocaine 2.5% 5%-8% Gel topical PLO cream     30 g    Apply 1 g topically 3 times daily as needed    Postlaminectomy syndrome of lumbosacral region, Postlaminectomy syndrome of lumbar region       meloxicam 7.5 MG tablet    MOBIC    30 tablet    Take 1 tablet (7.5 mg) by mouth daily    Chronic migraine without aura without status migrainosus, not intractable       metoclopramide 5 MG tablet    REGLAN    20 tablet    Take 1 tablet (5 mg) by mouth every 6 hours as needed    Intractable chronic migraine without aura and with status migrainosus       MULTIVITAMIN PO      Take by mouth daily        omeprazole 20 MG CR capsule    priLOSEC    90 capsule    Take 1 capsule (20 mg) by mouth daily    Hiatal hernia       ondansetron 4 MG ODT tab    ZOFRAN-ODT    60 tablet    Take 1-2 tablets (4-8 mg) by mouth every 12 hours as needed for nausea    Migraine without status migrainosus, not intractable, unspecified migraine type        polyethylene glycol powder    MIRALAX/GLYCOLAX     1 capful Take 1 capful by mouth daily        PROBIOTIC DAILY PO      Take by mouth 2 times daily 2 packets daily        SUMAtriptan 6 MG/0.5ML pen injector kit    IMITREX STATDOSE    2 kit    Inject 0.5 mLs (6 mg) Subcutaneous at onset of headache for migraine (may repeat once after 2 hrs) May repeat in 1 hour. Max 12 mg/24 hours.    Chronic migraine without aura without status migrainosus, not intractable       SYNTHROID 25 MCG tablet   Generic drug:  levothyroxine      Take 25 mcg by mouth daily        tiZANidine 4 MG tablet    ZANAFLEX    90 tablet    Take 2 tablets (8 mg) by mouth 3 times daily as needed for muscle spasms    Post laminectomy syndrome       zonisamide 25 MG capsule    Zonegran    70 capsule    Take 1 tablet (25 mg) once daily for 1 week, then 2 tablets once daily for 1 week, then 3 tablets once daily for 1 week, then 4 tablets once daily for 1 week.    Occipital neuritis

## 2017-10-27 NOTE — PROGRESS NOTES
[facility] : Progress Note    Date of visit: 10/27/2017    Interval history:  Ada Welch is a 46 year old female  is known to me for Data Unavailable.Ada eWlch is a 46 year old female with past medical history significant for Ehler Danlos syndrome is known to me for chronic  low back pain, which is mostly located at lower lumbar area. The patient has very complex history of low back pain for nearly 24 years for which she underwent L4-S1 fusion (TLIF) 12/09/2013.  Postoperatively her  back pain even became much worse than preop.  Mainly LBP, but also radiates down both legs S1 dermatome distribution.   She then had  removal of posterior instrumentation (screws and guadalupe) with exploration of fusion.  Fusion noted to already be solid.  NO significant relief afforded by this surgery. The pain has been progressively worsened, limiting the patient s activities, affecting mood and sleep quality, and causing a decrease in the patient s quality of life.  She underwent multiple SUNSHINE's which were not helpful, and even made things worse.  Recently started aquatic based physical therapy. Previous EMG showed some L5 radiculopathy on left. Her pain significantly improved after we started her butrans patch. She continues to be more functional than before the butrans was prescribed. She is able to walk longer distances and do activities with less pain that before. She has numbness in her left 4th toe that has been constant since her surgery. She denies any significant side effects from the medications. She also denies any new bowel or bladder issues, new weakness or numbness.      Today she came for f/u visit. She admitted in the hospital recently with intractable migraine headache. At the time she had  MRI/MRA of the brain, a venogram and an MRI of the cervical spine. These studies were  notable for mild to moderate degenerative changes from C4-C7. She  "had Moderate loss of disc height is some loss of increased T2 signal of the disc at C5-6. Small broad-based disc bulge. Moderate right neuroforaminal narrowing noted at this level.     Recommendations/plan at the last visit included:  - Interventions: None at this time.  SUNSHINE's and SCS and other options have been discussed with her but this would be a last resort as she feels her body \"rejects foreign objects\". Discussed about Botox injection for migraine headache if conservative therapies fail.   - Medications:  getting excellent benefit with butrans at this time and is on a stable dose.    -  Continue tizanidine 8mg to help with her muscle spasms.  Continue ibuprofen and ACETAMINOPHEN prn.  - Take Amitriptyline 10 mg  at bedtime; the dose may be increased to 20 mg at bedtime.  - Physical Therapy:  Continue as scheduled - she also does aquatic therapy - she will continue her HEP  - Psychology/Coping skills: continue therapy in [place]  - Diagnostic Studies:  None today     Follow up: 3 months        Since the last visit, Ada Welch reports:  - she tried medical marijuana but didn't get any benefit  - She went to [facility] to see a neurosurgeon familiar of EDS. She had standing MRI performed. The reports are not available    -  Major issues are: intractable headache. Taking multiple medications and but not sure if they are working. Also taking butrans patch, tramadol prn, tizanidine, and meloxicam.     - SUNSHINE procedure response: The patient reported 'bad reaction' with SUNSHINE. Not interested in further procedures.       [place] Prescription Monitoring Program:   Reviewed. No concerns    Review of Systems:  The 14 system ROS was reviewed and was negative except what is documented above and as follows.  Any bowel or bladder problems: none  Mood: okay    Physical Exam:  Vitals:    10/27/17 0933   BP: 108/71   Pulse: (!) 10   SpO2: 95%   Weight: 90.9 kg (200 lb 6.4 oz)   Height: 1.702 m (5' 7\")       General: Awake " in no apparent distress. This patient is accompanied in the office by patient's mom.  Eyes: Sclerae are anicteric. PERRLA, EOMI   Neck: soft C-collar in place  Head: tender to palpation in the occipital region  Lungs: unlabored.   Heart: regular rate and rhythm   Abdomen: soft non tender.  Extremities: Pulses are well palpable, no peripheral edema.   Musculoskeletal: All muscle groups are normal in bulk and tone. The patient changes position without pain behavior. The patient walks with a normal gait. Posture is normal. Muscle strength was rated at 5/5 in all groups in the extremities. Examination of the joints reveals preserved range of motion.  Neurologic exam:Strength 5/5 for bilateral grasp, finger abduction, wrist extension, elbow flexion, elbow extension, shoulder abduction.  Strength 5/5 for bilateral dorsiflexion, plantarflexion, great toe extension, knee extension, hip flexion. Sensation intact throughout all dermatomes bilateral upper extremities and lower extremities  Psychiatric; Normal affect.   Skin: Warm and Dry.    Medications:  Current Outpatient Prescriptions   Medication Sig Dispense Refill     meloxicam (MOBIC) 7.5 MG tablet Take 1 tablet (7.5 mg) by mouth daily 30 tablet 1     zonisamide (ZONEGRAN) 25 MG capsule Take 1 tablet (25 mg) once daily for 1 week, then 2 tablets once daily for 1 week, then 3 tablets once daily for 1 week, then 4 tablets once daily for 1 week. 70 capsule 3     buprenorphine (BUTRANS) 10 MCG/HR WK patch Place 1 patch onto the skin every 7 days 4 patch 0     eletriptan (RELPAX) 20 MG tablet Take 1 tablet (20 mg) by mouth at onset of headache for migraine May repeat in 2 hours. Max 4 tablets/24 hours. 18 tablet 1     levothyroxine (SYNTHROID) 25 MCG tablet Take 25 mcg by mouth daily       amitriptyline (ELAVIL) 10 MG tablet Take 3 tablets (30 mg) by mouth At Bedtime 90 tablet 2     famotidine (PEPCID) 20 MG tablet Take 1 tablet (20 mg) by mouth At Bedtime 90 tablet 1      omeprazole (PRILOSEC) 20 MG CR capsule Take 1 capsule (20 mg) by mouth daily 90 capsule 1     ondansetron (ZOFRAN-ODT) 4 MG ODT tab Take 1-2 tablets (4-8 mg) by mouth every 12 hours as needed for nausea 60 tablet 1     metoclopramide (REGLAN) 5 MG tablet Take 1 tablet (5 mg) by mouth every 6 hours as needed 20 tablet 1     tiZANidine (ZANAFLEX) 4 MG tablet Take 2 tablets (8 mg) by mouth 3 times daily as needed for muscle spasms 90 tablet 3     EPINEPHrine (EPIPEN/ADRENACLICK/OR ANY BX GENERIC EQUIV) 0.3 MG/0.3ML injection 2-pack Inject 0.3 mg into the muscle       albuterol (PROAIR HFA/PROVENTIL HFA/VENTOLIN HFA) 108 (90 BASE) MCG/ACT Inhaler Inhale 2 puffs into the lungs       fluticasone (FLONASE) 50 MCG/ACT spray 2 sprays       etodolac (LODINE) 400 MG tablet Take 400 mg by mouth       COMPRESSION STOCKINGS 1 each daily 20-30 mmHg Thigh Length measure and fit, color and style per patient preference. Doff n jose per need. 3 each 3     SUMAtriptan (IMITREX STATDOSE) 6 MG/0.5ML pen injector kit Inject 0.5 mLs (6 mg) Subcutaneous at onset of headache for migraine (may repeat once after 2 hrs) May repeat in 1 hour. Max 12 mg/24 hours. 2 kit 1     ketamine, KETALAR, 5%-gabapentin, NEURONTIN, 8%-lidocaine 2.5% 5%-8% GEL topical PLO cream Apply 1 g topically 3 times daily as needed 30 g 3     fluticasone-salmeterol (ADVAIR DISKUS) 250-50 MCG/DOSE diskus inhaler Inhale 1 puff into the lungs 2 times daily as needed        cetirizine (ZYRTEC) 10 MG tablet Take 10 mg by mouth daily        polyethylene glycol (MIRALAX/GLYCOLAX) powder 1 capful Take 1 capful by mouth daily       Multiple Vitamins-Minerals (MULTIVITAMIN PO) Take by mouth daily       Probiotic Product (PROBIOTIC DAILY PO) Take by mouth 2 times daily 2 packets daily           LABORATORY VALUES:   Recent Labs   Lab Test  10/20/17   1150  07/24/17   1518   NA  138  141   POTASSIUM  3.7  4.0   CHLORIDE  107  110*   CO2  23  27   ANIONGAP  8  5   GLC  108*  88  "  BUN  10  8   CR  0.78  0.85   JUAN  8.4*  8.9       CBC RESULTS:   Recent Labs   Lab Test  10/20/17   1150   WBC  6.1   RBC  4.29   HGB  13.4   HCT  38.8   MCV  90   MCH  31.2   MCHC  34.5   RDW  12.5   PLT  216       Most Recent 3 INR's:  Recent Labs   Lab Test  07/25/17   1249   INR  1.01       Diagnostic tests:            ASSESSMENT:    - Chronic migraine without aura without status migrainosus, not intractable  - Postlaminectomy syndrome of lumbar region       PLAN:    - Interventions: Will schedule her for occipital blocks.  SUNSHINE's and SCS and other options have been discussed with her but this would be a last resort as she feels her body \"rejects foreign objects\". Discussed about Botox injection for migraine headache if conservative therapies fail.   - Medications:  getting excellent benefit with butrans at this time and is on a stable dose.    -  Continue tizanidine 8mg to help with her muscle spasms.  Continue ibuprofen and ACETAMINOPHEN prn.  - Take Amitriptyline 30 mg at bedtime.  - Physical Therapy:  Continue as scheduled - she also does aquatic therapy - she will continue her HEP  - Psychology/Coping skills: continue therapy in [place]  - Diagnostic Studies:  None today     Follow up: 3 months        Answers for HPI/ROS submitted by the patient on 10/25/2017   SAMANTHA 7 TOTAL SCORE: 0  PHQ-2 Score: 0  General Symptoms: Yes  Skin Symptoms: Yes  HENT Symptoms: Yes  EYE SYMPTOMS: Yes  HEART SYMPTOMS: Yes  LUNG SYMPTOMS: Yes  INTESTINAL SYMPTOMS: Yes  URINARY SYMPTOMS: Yes  GYNECOLOGIC SYMPTOMS: No  BREAST SYMPTOMS: No  SKELETAL SYMPTOMS: Yes  BLOOD SYMPTOMS: Yes  NERVOUS SYSTEM SYMPTOMS: Yes  MENTAL HEALTH SYMPTOMS: No  Fever: No  Loss of appetite: Yes  Weight loss: No  Weight gain: No  Fatigue: Yes  Night sweats: No  Chills: Yes  Increased stress: No  Excessive hunger: No  Excessive thirst: Yes  Feeling hot or cold when others believe the temperature is normal: Yes  Loss of height: No  Post-operative " complications: Yes  Surgical site pain: Yes  Hallucinations: No  Change in or Loss of Energy: Yes  Hyperactivity: No  Confusion: Yes  Changes in hair: Yes  Changes in moles/birth marks: No  Itching: No  Rashes: No  Changes in nails: No  Acne: No  Hair in places you don't want it: No  Change in facial hair: No  Warts: No  Non-healing sores: No  Scarring: Yes  Flaking of skin: No  Color changes of hands/feet in cold : Yes  Sun sensitivity: Yes  Skin thickening: No  Ear pain: Yes  Ear discharge: No  Hearing loss: Yes  Tinnitus: Yes  Nosebleeds: No  Congestion: Yes  Sinus pain: No  Trouble swallowing: Yes   Voice hoarseness: Yes  Mouth sores: No  Sore throat: No  Tooth pain: No  Gum tenderness: No  Bleeding gums: No  Change in taste: Yes  Change in sense of smell: No  Dry mouth: Yes  Hearing aid used: No  Neck lump: Yes  Eye pain: No  Vision loss: Yes  Dry eyes: Yes  Watery eyes: Yes  Eye bulging: No  Double vision: Yes  Flashing of lights: Yes  Spots: Yes  Floaters: Yes  Redness: Yes  Crossed eyes: No  Tunnel Vision: Yes  Yellowing of eyes: No  Eye irritation: Yes  Cough: No  Sputum or phlegm: Yes  Coughing up blood: No  Difficulty breating or shortness of breath: Yes  Snoring: No  Wheezing: Yes  Difficulty breathing on exertion: Yes  Respiratory pain: No  Nighttime Cough: No  Difficulty breathing when lying flat: Yes  Chest pain or pressure: Yes  Fast or irregular heartbeat: Yes  Pain in legs with walking: Yes  Swelling in feet or ankles: Yes  Trouble breathing while lying down: Yes  Fingers or Toes appear blue: Yes  High blood pressure: No  Low blood pressure: Yes  Fainting: Yes  Murmurs: No  Chest pain on exertion: No  Chest pain at rest: No  Cramping pain in leg during exercise: Yes  Pacemaker: No  Varicose veins: No  Edema or swelling: Yes  Fast heart beat: Yes  Wake up at night with shortness of breath: Yes  Heart flutters: Yes  Light-headedness: Yes  Exercise intolerance: Yes  Heart burn or indigestion:  Yes  Nausea: Yes  Vomiting: No  Abdominal pain: Yes  Bloating: Yes  Constipation: Yes  Diarrhea: Yes  Blood in stool: Yes  Black stools: No  Rectal or Anal pain: No  Fecal incontinence: No  Rectal bleeding: Yes  Yellowing of skin or eyes: No  Vomit with blood: No  Change in stools: Yes  Hemorrhoids: Yes  Trouble holding urine or incontinence: Yes  Pain or burning: Yes  Trouble starting or stopping: Yes  Increased frequency of urination: Yes  Blood in urine: No  Decreased frequency of urination: No  Frequent nighttime urination: Yes  Flank pain: Yes  Difficulty emptying bladder: Yes  Back pain: Yes  Muscle aches: Yes  Neck pain: Yes  Swollen joints: Yes  Joint pain: Yes  Bone pain: Yes  Muscle cramps: Yes  Muscle weakness: Yes  Joint stiffness: Yes  Bone fracture: No  Anemia: No  Swollen glands: Yes  Easy bleeding or bruising: Yes  Trouble with coordination: No  Dizziness or trouble with balance: Yes  Fainting or black-out spells: Yes  Memory loss: Yes  Headache: Yes  Seizures: No  Speech problems: No  Tingling: Yes  Tremor: No  Weakness: Yes  Difficulty walking: No  Paralysis: No  Numbness: Yes

## 2017-10-28 LAB
BACTERIA SPEC CULT: ABNORMAL
Lab: ABNORMAL
SPECIMEN SOURCE: ABNORMAL

## 2017-10-30 DIAGNOSIS — G43.711 INTRACTABLE CHRONIC MIGRAINE WITHOUT AURA AND WITH STATUS MIGRAINOSUS: ICD-10-CM

## 2017-10-30 DIAGNOSIS — N39.0 URINARY TRACT INFECTION: Primary | ICD-10-CM

## 2017-10-30 RX ORDER — METOCLOPRAMIDE 5 MG/1
5 TABLET ORAL EVERY 6 HOURS PRN
Qty: 20 TABLET | Refills: 1 | OUTPATIENT
Start: 2017-10-30

## 2017-10-30 RX ORDER — NITROFURANTOIN 25; 75 MG/1; MG/1
100 CAPSULE ORAL 2 TIMES DAILY
Qty: 10 CAPSULE | Refills: 0 | Status: SHIPPED | OUTPATIENT
Start: 2017-10-30 | End: 2017-12-15

## 2017-10-30 RX ORDER — NITROFURANTOIN 25; 75 MG/1; MG/1
100 CAPSULE ORAL 2 TIMES DAILY
Qty: 10 CAPSULE | Refills: 0 | Status: SHIPPED | OUTPATIENT
Start: 2017-10-30 | End: 2017-11-04

## 2017-10-30 NOTE — PROGRESS NOTES
2017      Ellyn Rivera MD   [address]   [place] [address]      RE: Ada Welch   MRN: [id number]   : 1970      Dear Dr. Rivera:      Your patient, Ada Welch, requested comprehensive neurologic consultation today on 10/04/2017.  Her chief complaint is that of chronic headache, neck pain and fatigue.      The patient has had chronic problems for years.  She said that she finally did see a specialist away from the [facility].  That specialist said that she had gastric paresis, disequilibrium, cervical instability, mast cell disorder, Chiari malformation, TMJ, and hearing loss.  Despite that no tests were done and no records are forthcoming.  I stressed the importance of the patient to have those.  It was apparent that the patient already sought neurologic consultation earlier in our department.  She did tell me that she is applying for disability.  She went on to state that she has been rejected 5 times from being seen in [place] at the [facility].  She described having vertigo.  This started in July.  She also said that she has had low blood pressure and she is seeing Dr. Russell for it.  She has not suffered any hearing loss.  She has noted intermittent ringing and buzzing in her ears for years.  The patient has had chronic nausea for the last 5 years.  This is worse.  She has had occasional constipation.  She said it hurts for her to eat.  Despite that she has gained weight.  She now weighs 200 pounds and is 5 feet 7 inches tall.  The patient did tell me that she has had chronic spine pain and she did have 4 epidural injections which actually made her worse.  She has been using gabapentin and Lyrica on separate occasions and did gain weight and did not have benefit from the medications.  The patient more recently has been on zonisamide.  She did tell me that she has had daily headaches that started in  and escalated in .  She had  them for a full month.  She said she was treated with DHE through a neurologist in [place].  She then was much better and started to suffer headaches again in April.  She was admitted to the [facility] and she did not tolerate DHE.  The patient was told that she could not use it again because of potential risk of it.  She continues to have paresthesias involving her face.  She has noted that she has had twitchiness involving the right side of her mouth at times.  She notes a hot feeling involving the base of her skull.  She said that she has had pain in the mastoid areas that radiate into the biparietal area then into the frontal area where she has pressure.  She has had postnasal discharge for at least a year.  The patient has had nasal surgery which helped by Dr. Zoe Barahona.  The patient did have 3 episodes of syncope in July.  She noted dizziness starting then when she would turn her head to either side.  She said that she basically has pain involving her spine for 24 hours.  She has found that a down pillow that is flat may work better than other pillows but really does not give her any significant relief.  She has tried trigger point injections without any real benefit.  She has noted pain that has traveled from her neck into both arms.  The patient has had further evaluations here including sleep consult.  She was just recently seen on 09/28/2017.  The patient has not been able to work because of her issues.  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  She currently has been spending a lot of time in [employer] support groups.  She does snore at times.  The patient has had difficulty breathing through her nose.  Her neurologist who did tell her she had multiple problems was recorded as Dr. Mukul Sofia in [facility], but again those records were not available to practitioners here.She described visual blurring and also discussed colored vision  and her ability for synesthesia and also electrokinesis.        ALLERGIES:   The patient has had allergies to bee venom, chicken derived products, gabapentin, gluten, lactulose, milk protein, topiramate, wheat and wheat brand.      CURRENT MEDICATIONS:   Her medicines now include:   1.  Synthroid.     2.  Mobic.    3.  Elavil 30 mg at bedtime.     4.  Pepcid.     5.  Prilosec.   6.  p.r.n. Zofran.   7.  p.r.n. Reglan 5 mg.     8.  Ultram 50 mg up to every 6 hours for pain.     9.  Maxalt 5 mg OTD tabs.   10.  Tizanidine 8 mg by mouth t.i.d. for muscle spasms.     11.  Butrans patch.   12.  Carrying an EpiPen.     13.  Albuterol.     14.  Flonase.     15.  Lodine.    16.  Sumatriptan stat dose 6 mg with a pen ejector kit.     17.  Topical ketamine-gabapentin-lidocaine with 8% gel topical PLO cream.     18.  Advair.     19.  Zyrtec.     20.  MiraLax.     21.  Multivitamin.    22.  Probiotic.        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  Her other diagnoses include autoimmune disease with immunosuppression, easy bruising, chronic sinusitis, chronic tonsillitis with prior history of rheumatic fever and tonsillectomies being removed in 1981, GERD with a hiatal hernia, hoarseness which comes and goes, nasal polyps, and prior history of pneumonia.  She also has had prior history of lymph node swelling and she was diagnosed with thyroid disease in 2008.  She has had adenoidectomy, repair of nasal septum, nasal-sinus polypectomy, tonsillectomy and tubal ligation.  She drinks very little.  She does not use any illicit drugs.  The patient quit smoking in 2013.  She has a strong family history of Tom-Danlos syndrome with connective tissue disease issues.      I reviewed the records of Dr. Fara Varner,  APRN-CNP in the Department of Neurology.  The patient in Dr. Varner's records do indicate that she had been seeing Dr. Martinez and she reported Botox was not an option.  She noted physical therapy had made her headaches worse.  She did note that rizatriptan would help for a short time.  Medrol Dosepak possibly had given her some relief.  Her intolerance to DHE was reduction in her blood pressure.  The patient did note that ketorolac helped for a short time.  She did end up having MRA and MRV study that was normal.  This was read by Dr. Miller.        The patient did review with me her history of Tom-Danlos and being diagnosed with type 3.      The patient did review with me her past history that was recorded through [facility] emergency room before she was admitted and she had received many medicines while in the ED including Depakote.  She did go on to tell me she had tried this many years ago but did not recall if it helped, Toradol, gabapentin, Dilaudid, Decadron, Benadryl, Reglan, Phenergan and magnesium.  Despite this, her headaches have continued to bother her.  She had stated before she was admitted that DHE seemed to take the edge off for several hours but pain would recur.  She had been given multiple doses in the emergency room before she was admitted.  She had multiple doses of diphenhydramine, Toradol, magnesium, Reglan, Zofran, Phenergan, tizanidine and 1 dose of Decadron along with the DHE protocol.  When the patient was admitted on 07/24, she was discharged by 07/27 and she was told that she did not tolerate the DHE because of hypotension.        The patient also had a normal MRI of the brain on 07/24/2017.      The patient did have an MRI scan of the cervical spine on 07/24/2017 and it was compared to an MRI scan from 02/17/2016.  She had no abnormal enhancement of the spinal cord, thecal sac or cervical vertebrae.  She had unchanged mild to moderate degenerative changes, most prominent at C4 through C7,  and moderate right neural foraminal narrowing at C5-C6.  I reviewed these scans with her.      The patient did tell me that she had been in a wheelchair last fall but had improved to crutches and now was able walk without them.  She did complain about her chronic lower back pain.  She went on to tell me that she does have a sleep study pending.      Neurologic examination revealed a woman who did not appear to be in distress.  Gait, station, cerebellar testing, muscle stretch reflexes which were present all to reinforcement, symmetrically, plantar stimulation, strength testing, cranial nerve examination except for decreased sensation to pinprick involving the midline upper cervical spine below the skull base, as well as decreased sensation to pinprick involving the left foot, are otherwise unremarkable.  She was tender involving the left occipital notch.  She had mild reduction in extension and lateral rotation of her neck.  I could not auscultate cervical bruits.  She had a regular cardiac rhythm without gallops or murmurs.      IMPRESSION:     1.  Chronic migraine.   2.  Cervical spondylosis.   3.  Chronic neck and arm pain.   4.  Chronic low back pain.   5.  History of Tom-Danlos syndrome.   6.  Incomplete medical records.      I explained to the patient I could not really comment on her diagnosis made by a neurologist in the past in an outside institution.  I do not have those records and strongly urged that she get those.  She does have other outside records pending, too.  I told her the importance of having these available to us.  I did discuss with her not driving because of her syncopal episodes.  The patient does need to review this with Dr. Russell, who she is going to be seeing tomorrow.  I did talk with her about having EMG testing of the arms.  The patient has noted decreased ability to grasp with decreased strength.  I did talk with her about gradually increase zonisamide.  I went over potential  side effects with her and the need for hydration and to watch for skin rash and renal lithiasis as well as irritability.I also recommended neurosurgery consultation and formal neuro opthalmology exam.      I have asked that the patient have neurologic followup with me.      I did go over with the patient issues regarding analgesic rebound and I did talk with her about importance of day to day activities which may be helpful in terms of treating chronic migraine.      The patient did have complement me and said that I had spent more time with her and done a better examination than the [place] neurologist [place] of [place] had done.        Sincerely yours,      Bernadette Galeano MD       I spent 75 minutes with the patient today.  Over 50% of the time this involved counseling and coordination of care.  A complete review of medical systems was done and a positive review is listed in the report above.         BERNADETTE GALEANO MD             D: 10/30/2017 12:08   T: 10/30/2017 14:11   MT: ADAMS      Name:     PANCHO HENAO   MRN:      [id number]        Account:      [id number]   :      1970           Service Date: 10/04/2017      Document: [id number]

## 2017-10-31 ENCOUNTER — TELEPHONE (OUTPATIENT)
Dept: ANESTHESIOLOGY | Facility: CLINIC | Age: 47
End: 2017-10-31

## 2017-10-31 DIAGNOSIS — M96.1 POSTLAMINECTOMY SYNDROME OF LUMBAR REGION: ICD-10-CM

## 2017-10-31 DIAGNOSIS — H53.40 VISUAL FIELD DEFECT: Primary | ICD-10-CM

## 2017-10-31 DIAGNOSIS — M96.1 LUMBAR POST-LAMINECTOMY SYNDROME: ICD-10-CM

## 2017-10-31 LAB
COLLECT DURATION TIME UR: 24 H
PROSTAGLANDIN D2 24H UR-MRATE: 85 NG/24 HRS (ref 100–280)
SPECIMEN VOL 24H UR: 1040 ML

## 2017-10-31 RX ORDER — BUPRENORPHINE 10 UG/H
1 PATCH TRANSDERMAL
Qty: 4 PATCH | Refills: 0 | Status: SHIPPED | OUTPATIENT
Start: 2017-10-31 | End: 2018-01-25

## 2017-10-31 NOTE — TELEPHONE ENCOUNTER
Call back to pt regarding pain flare.  Pt reported BUE numbness and tingling, pain radiating into collar bone, shoulder, head, neck, and upper back.  Pt reports pain 9/10.  Dr. Martinez updated. Dr. Martinez recommends pt take celebrex 100mg BID as needed for pain on a full stomach.     Jessica De Souza, RN, BSN

## 2017-10-31 NOTE — TELEPHONE ENCOUNTER
----- Message from Jessica De Souza RN sent at 10/31/2017 10:19 AM CDT -----  Regarding: FW: Pt of Dr. Martinez calling requesting call back  Contact: [phone]      ----- Message -----     From: Ronal Jackson     Sent: 10/31/2017   9:52 AM       To: Adult Chronic Pain Nurses-[facility]  Subject: Pt of Dr. Martinez calling requesting call back     Pt of Dr. Martinez called.    Requesting call back. Second call in two days.    Please call her at [phone].    Thank You    Sam S  Call Center    Please DO NOT send this message and/or reply back to sender. Call Center Representatives DO NOT respond to messages.

## 2017-10-31 NOTE — TELEPHONE ENCOUNTER
Refill request    Medication: buprenorphine (BUTRANS) 10 MCG/HR WK patch  Place 1 patch onto the skin every 7 days    MNPMP Checked: Yes    buprenorphine (BUTRANS) 10 MCG/HR WK patch - Last refilled 10/3/17 for 4 patches      Refilled: YES, dated to be refilled 30 days since last refill    Last clinic appointment: 10/27/17  Next clinic appointment: TBD    Patient requested to:    Called in to pharmacy

## 2017-11-01 ENCOUNTER — OFFICE VISIT (OUTPATIENT)
Dept: OPHTHALMOLOGY | Facility: CLINIC | Age: 47
End: 2017-11-01
Attending: OPHTHALMOLOGY
Payer: COMMERCIAL

## 2017-11-01 DIAGNOSIS — H04.123 INSUFFICIENCY OF TEAR FILM OF BOTH EYES: Primary | ICD-10-CM

## 2017-11-01 DIAGNOSIS — H01.00B BLEPHARITIS OF UPPER AND LOWER EYELIDS OF BOTH EYES, UNSPECIFIED TYPE: ICD-10-CM

## 2017-11-01 DIAGNOSIS — H01.00A BLEPHARITIS OF UPPER AND LOWER EYELIDS OF BOTH EYES, UNSPECIFIED TYPE: ICD-10-CM

## 2017-11-01 DIAGNOSIS — H53.10 SUBJECTIVE VISUAL DISTURBANCE OF BOTH EYES: ICD-10-CM

## 2017-11-01 PROCEDURE — 99213 OFFICE O/P EST LOW 20 MIN: CPT | Mod: ZF | Performed by: TECHNICIAN/TECHNOLOGIST

## 2017-11-01 PROCEDURE — 92083 EXTENDED VISUAL FIELD XM: CPT | Mod: ZF | Performed by: OPHTHALMOLOGY

## 2017-11-01 RX ORDER — CELECOXIB 100 MG/1
100 CAPSULE ORAL 2 TIMES DAILY
Qty: 60 CAPSULE | Refills: 1 | Status: SHIPPED | OUTPATIENT
Start: 2017-11-01 | End: 2017-12-22

## 2017-11-01 ASSESSMENT — CONF VISUAL FIELD
OD_NORMAL: 1
METHOD: COUNTING FINGERS
OS_NORMAL: 1

## 2017-11-01 ASSESSMENT — EXTERNAL EXAM - RIGHT EYE: OD_EXAM: NORMAL

## 2017-11-01 ASSESSMENT — VISUAL ACUITY
OS_SC: 20/20
OS_SC+: -1
METHOD: SNELLEN - LINEAR
OD_SC: 20/20

## 2017-11-01 ASSESSMENT — ENCOUNTER SYMPTOMS: JOINT SWELLING: 1

## 2017-11-01 ASSESSMENT — TONOMETRY
OD_IOP_MMHG: 15
IOP_METHOD: ICARE
OS_IOP_MMHG: 13

## 2017-11-01 ASSESSMENT — CUP TO DISC RATIO
OS_RATIO: 0.4
OD_RATIO: 0.4

## 2017-11-01 ASSESSMENT — EXTERNAL EXAM - LEFT EYE: OS_EXAM: NORMAL

## 2017-11-01 NOTE — MR AVS SNAPSHOT
After Visit Summary   11/1/2017    Ada Welch    MRN: [id number]           Patient Information     Date Of Birth          1970        Visit Information        Provider Department      11/1/2017 10:30 AM [facility]        Today's Diagnoses     Insufficiency of tear film of both eyes    -  1    Subjective visual disturbance of both eyes        Blepharitis of upper and lower eyelids of both eyes, unspecified type           Follow-ups after your visit        Your next 10 appointments already scheduled     Nov 07, 2017  9:30 AM CST   (Arrive by 9:15 AM)   EMG with Jorge Pickard MD   [facility] EMG ([facility])    [address] [place] [id number] [phone]           Do not use lotions or creams on the area to be tested. If you are on blood thinners (Warfarin, Coumadin, Lovenox, etc), please contact your primary care physician to check if it is safe to stop them 3 days prior to testing. If you have anxiety, please check with your referring physician to obtain anti-anxiety medication for the procedure.            Nov 13, 2017 10:30 AM CST   (Arrive by 10:15 AM)   Return Visit with Ellyn Rivrea MD   [facility] Primary Care Clinic ([facility])    [address] [place] [id number] [phone]            Nov 22, 2017 11:00 AM CST   (Arrive by 10:45 AM)   New Patient Visit with Juana Griffith MD   [facility] Gastroenterology and IBD Clinic ([facility])    [address] [place] [id number]   [phone]            Nov 30, 2017  9:30 AM CST   (Arrive by 9:15 AM)   New Patient Visit with Bertha Moncada MD   [facility] Neurosurgery ([facility])    [address] [place] [address]   [phone]            Dec 06, 2017 11:15 AM CST   (Arrive by 11:00  AM)   Return Visit with Wade Singh MD   [facility] Urology and Inst for Prostate and Urologic Cancers ([facility] [place])    [address]  [address] [facility] [id number] [phone]            Dec 09, 2017  9:10 AM CST   (Arrive by 8:55 AM)   Return Visit with Seth Galeano MD   [facility] Neurology ([facility])    [address]  [address] [facility] [id number] [phone]            Feb 05, 2018 11:00 AM CST   (Arrive by 10:45 AM)   Implantable Loop Recorder with Uc Cv Device 1   [facility] ([facility])    [address]  [address] [facility] [id number] [phone]            Feb 05, 2018 11:30 AM CST   (Arrive by 11:15 AM)   RETURN ARRHYTHMIA with FIDELINA Blanc [facility] ([facility])    [address]  [address] [facility] [address] [phone]              Who to contact     Please call your clinic at [phone] to:    Ask questions about your health    Make or cancel appointments    Discuss your medicines    Learn about your test results    Speak to your doctor   If you have compliments or concerns about an experience at your clinic, or if you wish to file a complaint, please contact [facility] Patient Relations at [phone] or email us at [email or web address]         Additional Information About Your Visit        [employer]art Information     [employer] gives you secure access to your electronic health record. If you see a primary care provider, you can also send messages to your care team and make appointments. If you have questions, please call your primary care clinic.  If you do not have a primary care provider, please call [phone] and they will assist you.      [employer] is an electronic gateway that provides easy, online access to your medical records. With  Gil, you can request a clinic appointment, read your test results, renew a prescription or communicate with your care team.     To access your existing account, please contact your [facility] or call [phone] for assistance.        Care EveryWhere ID     This is your Care EveryWhere ID. This could be used by other organizations to access your [facility] medical records  [id number]         Blood Pressure from Last 3 Encounters:   11/02/17 120/86   10/27/17 108/71   10/20/17 102/68    Weight from Last 3 Encounters:   11/02/17 92.8 kg (204 lb 8 oz)   10/27/17 90.9 kg (200 lb 6.4 oz)   10/20/17 90.9 kg (200 lb 6.4 oz)              We Performed the Following     Glaucoma Top OU     IOP Measurement        Primary Care Provider Office Phone # Fax #    PocatelloDenise Rivera [phone]       [address] [place] [address]  [facility] [address]        Equal Access to Services     CHRISTINA GUY : Hadii aad ku hadasho Soomaali, waaxda luqadaha, qaybta kaalmada adeegyada, waxay idiin hayaan pawan dykes . So [facility] [phone].    ATENCIÓN: Si habla español, tiene a sweeney disposición servicios gratuitos de asistencia lingüística. Matthieuame al [phone].    We comply with applicable federal civil rights laws and [place] laws. We do not discriminate on the basis of race, color, national origin, age, disability, sex, sexual orientation, or gender identity.            Thank you!     Thank you for choosing [facility]  for your care. Our goal is always to provide you with excellent care. Hearing back from our patients is one way we can continue to improve our services. Please take a few minutes to complete the written survey that you may receive in the mail after your visit with us. Thank you!             Your Updated Medication List - Protect others around you: Learn how to safely use, store and throw away your medicines at www.disposemymeds.org.          This list is accurate as  of: 11/1/17 11:59 PM.  Always use your most recent med list.                   Brand Name Dispense Instructions for use Diagnosis    ADVAIR DISKUS 250-50 MCG/DOSE diskus inhaler   Generic drug:  fluticasone-salmeterol      Inhale 1 puff into the lungs 2 times daily as needed        albuterol 108 (90 BASE) MCG/ACT Inhaler    PROAIR HFA/PROVENTIL HFA/VENTOLIN HFA     Inhale 2 puffs into the lungs        amitriptyline 10 MG tablet    ELAVIL    90 tablet    Take 3 tablets (30 mg) by mouth At Bedtime    Chronic migraine without aura without status migrainosus, not intractable       buprenorphine 10 MCG/HR WK patch    BUTRANS    4 patch    Place 1 patch onto the skin every 7 days    Postlaminectomy syndrome of lumbar region       celecoxib 100 MG capsule    celeBREX    60 capsule    Take 1 capsule (100 mg) by mouth 2 times daily    Lumbar post-laminectomy syndrome       cetirizine 10 MG tablet    zyrTEC     Take 10 mg by mouth daily        COMPRESSION STOCKINGS     3 each    1 each daily 20-30 mmHg Thigh Length measure and fit, color and style per patient preference. Doshar n jose per need.    Orthostatic hypotension       eletriptan 20 MG tablet    RELPAX    18 tablet    Take 1 tablet (20 mg) by mouth at onset of headache for migraine May repeat in 2 hours. Max 4 tablets/24 hours.    Chronic daily headache, Intractable migraine without aura and with status migrainosus       EPINEPHrine 0.3 MG/0.3ML injection 2-pack    EPIPEN/ADRENACLICK/or ANY BX GENERIC EQUIV     Inject 0.3 mg into the muscle        etodolac 400 MG tablet    LODINE     Take 400 mg by mouth        famotidine 20 MG tablet    PEPCID    90 tablet    Take 1 tablet (20 mg) by mouth At Bedtime    Hiatal hernia, Mast cell disorder       fluticasone 50 MCG/ACT spray    FLONASE     2 sprays        ketamine (KETALAR) 5%-gabapentin (NEURONTIN) 8%-lidocaine 2.5% 5%-8% Gel topical PLO cream     30 g    Apply 1 g topically 3 times daily as needed    Postlaminectomy  syndrome of lumbosacral region, Postlaminectomy syndrome of lumbar region       metoclopramide 5 MG tablet    REGLAN    20 tablet    Take 1 tablet (5 mg) by mouth every 6 hours as needed    Intractable chronic migraine without aura and with status migrainosus       MULTIVITAMIN PO      Take by mouth daily        * nitroFURantoin (macrocrystal-monohydrate) 100 MG capsule    MACROBID    10 capsule    Take 1 capsule (100 mg) by mouth 2 times daily for 5 days    Urinary tract infection       * nitroFURantoin (macrocrystal-monohydrate) 100 MG capsule    MACROBID    10 capsule    Take 1 capsule (100 mg) by mouth 2 times daily    Urinary tract infection       omeprazole 20 MG CR capsule    priLOSEC    90 capsule    Take 1 capsule (20 mg) by mouth daily    Hiatal hernia       ondansetron 4 MG ODT tab    ZOFRAN-ODT    60 tablet    Take 1-2 tablets (4-8 mg) by mouth every 12 hours as needed for nausea    Migraine without status migrainosus, not intractable, unspecified migraine type       polyethylene glycol powder    MIRALAX/GLYCOLAX     1 capful Take 1 capful by mouth daily        PROBIOTIC DAILY PO      Take by mouth 2 times daily 2 packets daily        SUMAtriptan 6 MG/0.5ML pen injector kit    IMITREX STATDOSE    2 kit    Inject 0.5 mLs (6 mg) Subcutaneous at onset of headache for migraine (may repeat once after 2 hrs) May repeat in 1 hour. Max 12 mg/24 hours.    Chronic migraine without aura without status migrainosus, not intractable       SYNTHROID 25 MCG tablet   Generic drug:  levothyroxine      Take 25 mcg by mouth daily        tiZANidine 4 MG tablet    ZANAFLEX    90 tablet    Take 2 tablets (8 mg) by mouth 3 times daily as needed for muscle spasms    Post laminectomy syndrome       traMADol 50 MG tablet    ULTRAM    30 tablet    Take 1 tablet (50 mg) by mouth every 6 hours as needed for pain maximum 4 tablet(s) per day    Lumbar post-laminectomy syndrome       zonisamide 25 MG capsule    Zonegran    70 capsule     Take 1 tablet (25 mg) once daily for 1 week, then 2 tablets once daily for 1 week, then 3 tablets once daily for 1 week, then 4 tablets once daily for 1 week.    Occipital neuritis       * Notice:  This list has 2 medication(s) that are the same as other medications prescribed for you. Read the directions carefully, and ask your doctor or other care provider to review them with you.

## 2017-11-01 NOTE — PROGRESS NOTES
"ATTENDING ASSESSMENT AND PLAN:       1. 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  2. Aside from mild ocular surface disease, patient had otherwise normal neuro-ophthalmologic exam  3. Slowly progressive increase in \"floaters\" in both eyes- history and exam indicate this is due to physiologic vitreous syneresis- no evidence of retinal tear/ detachment, vitritis, or other pathologic etiology.  4. 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 Galeano for evaluation of vision changes in both eyes.    She has a complex medical and neurologic history and recently saw Dr. Galeano on 10/4/17 for chronic headache, neck pain, and fatigue. She reports recently seeing a neurologist who specializes in Tom-Danlos syndrome in [facility] in 8/2017. She reportedly had an upright MRI performed. That specialist told her that she had gastric paresis, disequilibrium, cervical instability, mast cell disorder, Chiari malformation, TMJ, and hearing loss. She is scheduled for follow up with that specialist in 1 month.     She describes pain originating at the base of the spine and radiating up into her head and down into her arms. She has tried gabapentin and Lyrica which did not help.     She has a history of TLIF L4-L5 and L5-S1 with solid arthrodesis.     She reports history of migraines since 1999. Her first cluster migraine was in 2008 and was treated with DHE. Then for several years (after around 2011), she did not have migraines until they came back in spring 2017. She reports that the new headaches are different than before and reports having associated confusion. When they first returned, they occurred once a month, then increased frequency to once a " "week, then since 7/4/17 she reports having daily migraines. She has photophobia, nausea, vomiting. She went into the ED on was admitted with migraine in 7/24/2017 which was associated with numbness radiating into her face. MRI brain at that time was normal.     She reports having \"shaking\" of the vision prior to onset of a migraine. She sometimes gets flashing lights in her vision and sometimes gets a bad metallic taste in her mouth prior to the onset of her headaches.      She reports blurring of the vision in both eyes at distance and at near during the past 1 year. She also feels that she sees her floaters more frequently within the past 1 year, although she has seen them her whole life.    She denies history of eye problems or eye surgeries. She had glasses but lost them.  She uses artificial tears intermittently- helps her vision momentarily.  She uses a preservative containing tear perhaps 3-4 times per week.  Improves momentarily with blinking.     The patient has normal afferent visual function in both eyes including normal best corrected visual acuity, color vision, confrontation visual fields, and pupillary light responses in both eyes.  Efferent ocular motor and alignment exam was unremarkable.  Slit lamp exam showed mild blepharitis, mild tear film break up time, and mild superficial punctate epithelial erosions in both eyes all indicative of mild ocular surface disease.  Dilated fundus exam was unremarkable in both eyes. 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  Cranial nerves V1-3, 7,8,9,11, and 12 were normal bilaterally.    OCT of the optic nerve head revealed normal retinal nerve fiber layer in both eyes.  OCT macula was normal in both eyes.  Octopus automated 30 degree visual field was essentially full in both eyes.    In conclusion, this patient's exam showed no neurologic etiology for her visual disturbance but did show signs of mild ocular surface disease that probably explains her intermittent " visual blurring.  She sees 20/20 at distance without correction- thus she does not require glasses.  She may benefit from over the counter low power reading glasses.    For her ocular surface disease I'd recommend:    Warm compresses twice a day   Fish oil supplement daily  Preservative free artificial tears four times a day      Follow-up with comprehensive ophthalmology in 4 months to follow-up on ocular surface disease           Complete documentation of historical and exam elements from today's encounter can be found in the full encounter summary report (not reduplicated in this progress note).  I personally obtained the chief complaint(s) and history of present illness.  I confirmed and edited as necessary the review of systems, past medical/surgical history, family history, social history, and examination findings as documented by others; and I examined the patient myself.  I personally reviewed the relevant tests, images, and reports as documented above.  I formulated and edited as necessary the assessment and plan and discussed the findings and management plan with the patient and family   Norm Batista MD

## 2017-11-01 NOTE — LETTER
"2017    RE: Ada Welch  : 1970  MRN: [id number]    Dear Dr. Galeano    Thank you for referring your patient, Ada Welch, to my neuro-ophthalmology clinic recently.  After a thorough neuro-ophthalmic history and examination, I came to the following conclusions:     1. 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  2. Aside from mild ocular surface disease, patient had otherwise normal neuro-ophthalmologic exam  3. Slowly progressive increase in \"floaters\" in both eyes- history and exam indicate this is due to physiologic vitreous syneresis- no evidence of retinal tear/ detachment, vitritis, or other pathologic etiology.  4. 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 Galeano for evaluation of vision changes in both eyes.    She has a complex medical and neurologic history and recently saw Dr. Galeano on 10/4/17 for chronic headache, neck pain, and fatigue. She reports recently seeing a neurologist who specializes in Tom-Danlos syndrome in [facility] in 2017. She reportedly had an upright MRI performed. That specialist told her that she had gastric paresis, disequilibrium, cervical instability, mast cell disorder, Chiari malformation, TMJ, and hearing loss. She is scheduled for follow up with that specialist in 1 month.     She describes pain originating at the base of the spine and radiating up into her head and down into her arms. She has tried gabapentin and Lyrica which did not help.     She has a history of TLIF L4-L5 and L5-S1 with solid arthrodesis.     She reports history of migraines since . Her first cluster migraine was in  and was treated with DHE. Then for several years (after " "around 2011), she did not have migraines until they came back in spring 2017. She reports that the new headaches are different than before and reports having associated confusion. When they first returned, they occurred once a month, then increased frequency to once a week, then since 7/4/17 she reports having daily migraines. She has photophobia, nausea, vomiting. She went into the ED on was admitted with migraine in 7/24/2017 which was associated with numbness radiating into her face. MRI brain at that time was normal.     She reports having \"shaking\" of the vision prior to onset of a migraine. She sometimes gets flashing lights in her vision and sometimes gets a bad metallic taste in her mouth prior to the onset of her headaches.      She reports blurring of the vision in both eyes at distance and at near during the past 1 year. She also feels that she sees her floaters more frequently within the past 1 year, although she has seen them her whole life.    She denies history of eye problems or eye surgeries. She had glasses but lost them.  She uses artificial tears intermittently- helps her vision momentarily.  She uses a preservative containing tear perhaps 3-4 times per week.  Improves momentarily with blinking.     The patient has normal afferent visual function in both eyes including normal best corrected visual acuity, color vision, confrontation visual fields, and pupillary light responses in both eyes.  Efferent ocular motor and alignment exam was unremarkable.  Slit lamp exam showed mild blepharitis, mild tear film break up time, and mild superficial punctate epithelial erosions in both eyes all indicative of mild ocular surface disease.  Dilated fundus exam was unremarkable in both eyes. 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  Cranial nerves V1-3, 7,8,9,11, and 12 were normal bilaterally.    OCT of the optic nerve head revealed normal retinal nerve fiber layer in both eyes.  OCT macula was normal in both " eyes.  Octopus automated 30 degree visual field was essentially full in both eyes.    In conclusion, this patient's exam showed no neurologic etiology for her visual disturbance but did show signs of mild ocular surface disease that probably explains her intermittent visual blurring.  She sees 20/20 at distance without correction- thus she does not require glasses.  She may benefit from over the counter low power reading glasses.    For her ocular surface disease I'd recommend:    Warm compresses twice a day   Fish oil supplement daily  Preservative free artificial tears four times a day      Follow-up with comprehensive ophthalmology in 4 months to follow-up on ocular surface disease      Again, thank you for trusting me with the care of your patient.  For further exam details, please feel free to contact our office for additional records.  If you wish to contact me regarding this patient please email me at [email or web address] [email or web address] or give my clinic a call to arrange a phone conversation.    Sincerely,    Norm Batista MD  , Neuro-Ophthalmology and Adult Strabismus  Department of Ophthalmology and Visual Neurosciences  [facility]

## 2017-11-02 ENCOUNTER — OFFICE VISIT (OUTPATIENT)
Dept: PHYSICAL MEDICINE AND REHAB | Facility: CLINIC | Age: 47
End: 2017-11-02

## 2017-11-02 ENCOUNTER — CARE COORDINATION (OUTPATIENT)
Dept: PHYSICAL MEDICINE AND REHAB | Facility: CLINIC | Age: 47
End: 2017-11-02

## 2017-11-02 VITALS
BODY MASS INDEX: 32.1 KG/M2 | SYSTOLIC BLOOD PRESSURE: 120 MMHG | WEIGHT: 204.5 LBS | TEMPERATURE: 98.1 F | RESPIRATION RATE: 20 BRPM | OXYGEN SATURATION: 97 % | HEART RATE: 98 BPM | HEIGHT: 67 IN | DIASTOLIC BLOOD PRESSURE: 86 MMHG

## 2017-11-02 DIAGNOSIS — R53.81 PHYSICAL DEBILITY: Primary | ICD-10-CM

## 2017-11-02 ASSESSMENT — PAIN SCALES - GENERAL: PAINLEVEL: EXTREME PAIN (8)

## 2017-11-02 NOTE — LETTER
11/2/2017       RE: Ada Welch  [address] [place] [address]     Dear Colleague,    Thank you for referring your patient, Ada Welch, to the [facility] PHYSICAL MEDICINE [facility]. Please see a copy of my visit note below.    CHRONIC MIGRAINE EVALUATION:        REQUESTING PROVIDER: Dr Galeano    CHIEF COMPLAINT:  Chronic migraine headache     HEADACHE PATTERN, HISTORY & PHYSICAL:  Ada Welch is a 46 year old female who presents for Migraine Botox injections evaluation.   Patient has had low back pain for 24 plus years, and history of Tom Danlos syndrome, gastric paresis, disequilibrium, cervical instability, mast cell disorder, Chiari malformation, TMJ, and hearing loss.   She was seen by Dr Chatman and referred here for possible Migraine Botox injections.   Per chart review and discussion with the patient, she was seen by Dr Enciso who had referred her to Rheumatology for evaluation of hypermobility and she reportedly received the formal diagnosis of Tom-Danlos syndrome after a genetic analysis.   She presents to the clinic today and notes that she has pain in the back of the neck, and has increased significantly without any injury in the last few weeks.  Denies any changes bowel and bladder since the Sunday. She has had incontinence of bladder for many years now. No change is noted.   She describes the pain being severe, she wears a neck brace almost all day. She notes that she has c1-c2 instability. Review of the MRI in July of 2017 shows No abnormal enhancement in the spinal cord, thecal sac or cervical Vertebrae, unchanged mild to moderate degenerative changes, most prominent in C4-C7. Moderate right neuroforaminal narrowing at C5-C6.   She notes that epidural and steroids do not help her. The steroids cause more pain.   She reports that she has 'cluster migraines', since 1999.  She notes dizziness when standing    Denies any recent falls. One in July is noted   She reports that she gets migraine headaches daily. Triggered by standing. Grades the pain at 4/10, and can get worsen to 10/10 with tightening. She notes photophobia and nausea. They last for 3-4 hrs if she takes medications and sleep. If she does not take the medications and not rest it can last for 2-3 days.   She notes a metallic taste in her mouth as auro.   She takes Zomig and maxalt and Imitrex. They cause some relief.   She is applying for disability, started one and half year ago.     The patient did tell me that she has had chronic spine pain and she did have 4 epidural injections which actually made her worse.  She has been using gabapentin and Lyrica on separate occasions and did gain weight and did not have benefit from the medications.  Low back pain for nearly 24 years for which she underwent a L4-S1 fusion (TLIF) on 12/9/2013.  Functionally, she is ambulating without a walker, or a cane. She has crutches and wheel chair. She unable to walk long distances.       Headache Onset History:  Ada Welch has a history of headaches over the last April this year.  She is seeking evaluation now because worsening headaches. Previous evaluation/s include:  consult with neurology, ENT consult and MRI scan.    Headache Family History:  Dad and sister both had history of headaches.    Current Headache Pattern:      Frequency ; every day     Duration of Headache:  3-4 hours to 48 hrs      Aura: no      Associated Symptoms:  nausea, light sensitivity and sound sensitivity    Migraine Complications:  numbness and weakness in fingers and hands        Description of Headache Pain & Location:  sharp pain  posterior neck, back of the head and left side >right side      Level of Pain during usual or 'typical' headache:  5/10      Level of Pain during the worst headache:  10/10    Do headaches interfere with or prevent usual activities or diminish patient's productivity  at home or work?  Yes - she is unable to walk and lies on the bed     Non-Pharmacologic Treatments Tried:  (such as food trigger elimination diets, biofeedback, relaxation, physical therapy, chiropractic, etc.)  Neck exercise program, Physical therapy and myofascial release     Medications Tried:    Rescue Meds:  Decadron (Dexamethasone) Injection    Epidural injections   NSAIDs: Ibuprofen - Advil  Triptan's: Maxalt / Maxalt MLT (Rizatriptan) PO  Imitrex (Sumatriptan) PO, Nasal Spray, SQ  Zomig / Zomig ZMT (Zolmitriptan) PO, Nasal Spray, SQ  Adjunctive Therapy: tizanidine 8 mg TID prn   Prophylactic Drug Treatment: Antiepileptics - amitriptyline   Muscle Relaxants - Zanaflex (Tizanidine)    Emergency Room utilization to treat headache symptoms:  (If so, how often and when was the last time used):  No    Acute Migraine Plan in Place?:  Yes - stays in the dark room and takes medications     Are Headaches worsening over time?  No    What makes headaches better?  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     Chronic sinusitis 00/2007    Diagnosed with chronic pansinusitis 2016     Chronic tonsillitis 1980    Had tonsils removed 02/1981, rheumatic fever     Tom-Danlos disease      Gastroesophageal reflux disease 3/1/2017    I have large hiatal hernia     Hernia, abdominal 10/2016    Hiatal Hernia     Hiatal hernia 2016    Have adeline hiatel hernia     Hoarseness Unsure    It comes and goes     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     Migraines 1/1/2007    Unsure of exact date     Nasal  polyps 2013    Were revoved 03/2017, benign     Pneumonia Unsure    Have had pneumonia several times     Swelling, mass, or lump in head and neck 08/2016    Lymph node has been growing for last year     Thyroid disease 01/01/2008              Past Surgical History:     Past Surgical History:   Procedure Laterality Date     ADENOIDECTOMY  1981     AS REPAIR OF NASAL SEPTUM       NASAL/SINUS POLYPECTOMY  2017    Polyps were benign     TONSILLECTOMY       TONSILLECTOMY  1981     TUBAL LIGATION              Social History:     Social History     Social History     Marital status:      Spouse name: Carry     Number of children: 5     Years of education: N/A     Occupational History     Not on file.     Social History Main Topics     Smoking status: Former Smoker     Packs/day: 0.20     Years: 10.00     Types: Cigarettes     Start date: 1/1/1991     Quit date: 12/1/2013     Smokeless tobacco: Never Used      Comment: I smoked on and off during specified dates.     Alcohol use Yes      Comment: couple per year     Drug use: No     Sexual activity: Yes     Partners: Male     Birth control/ protection: Female Surgical     Other Topics Concern     Not on file     Social History Narrative    5 kids: 24, 23, 19, 13, 5 yo     works at [employer]                Family History:     Family History   Problem Relation Age of Onset     Connective Tissue Disorder Mother      eds     Connective Tissue Disorder Sister      eds     CANCER Father      Spinal tumor grew into spinal cord, went in brain     Migraines Father      DIABETES Maternal Grandmother      Elderly diabetes     HEART DISEASE Maternal Grandmother      Hypertension Maternal Grandmother      DIABETES Paternal Grandmother      Elderly diabetes     DIABETES Paternal Grandfather      Elderly diabetes     Asthma Sister      Pediatric Asthma     Migraines Sister      Asthma Son      Pediatric Asthma     Thyroid Disease Sister       ,     Migraines Sister              Immunizations:     There is no immunization history on file for this patient.          Allergies:      Allergies   Allergen Reactions     Bee Venom Anaphylaxis, Difficulty breathing, Palpitations, Shortness Of Breath and Visual Disturbance     Patient does carry Epi-Pen     Chicken-Derived Products (Egg) Diarrhea     Other reaction(s): Nausea  Patient will self monitor  Other reaction(s): GI intolerance     Gabapentin      Gluten Meal      Lactose Dermatitis and Diarrhea     Milk Protein Extract      Other reaction(s): GI intolerance\  EGGS      Topiramate      Wheat Diarrhea     Wheat Bran      Other reaction(s): Nausea  Patient will self monitor            Medications:     Current Outpatient Prescriptions   Medication     celecoxib (CELEBREX) 100 MG capsule     buprenorphine (BUTRANS) 10 MCG/HR WK patch     nitroFURantoin, macrocrystal-monohydrate, (MACROBID) 100 MG capsule     nitroFURantoin, macrocrystal-monohydrate, (MACROBID) 100 MG capsule     traMADol (ULTRAM) 50 MG tablet     zonisamide (ZONEGRAN) 25 MG capsule     eletriptan (RELPAX) 20 MG tablet     levothyroxine (SYNTHROID) 25 MCG tablet     amitriptyline (ELAVIL) 10 MG tablet     famotidine (PEPCID) 20 MG tablet     omeprazole (PRILOSEC) 20 MG CR capsule     ondansetron (ZOFRAN-ODT) 4 MG ODT tab     metoclopramide (REGLAN) 5 MG tablet     tiZANidine (ZANAFLEX) 4 MG tablet     EPINEPHrine (EPIPEN/ADRENACLICK/OR ANY BX GENERIC EQUIV) 0.3 MG/0.3ML injection 2-pack     albuterol (PROAIR HFA/PROVENTIL HFA/VENTOLIN HFA) 108 (90 BASE) MCG/ACT Inhaler     fluticasone (FLONASE) 50 MCG/ACT spray     etodolac (LODINE) 400 MG tablet     COMPRESSION STOCKINGS     SUMAtriptan (IMITREX STATDOSE) 6 MG/0.5ML pen injector kit     ketamine, KETALAR, 5%-gabapentin, NEURONTIN, 8%-lidocaine 2.5% 5%-8% GEL topical PLO cream     fluticasone-salmeterol (ADVAIR DISKUS) 250-50 MCG/DOSE diskus inhaler     cetirizine (ZYRTEC) 10 MG tablet      "polyethylene glycol (MIRALAX/GLYCOLAX) powder     Multiple Vitamins-Minerals (MULTIVITAMIN PO)     Probiotic Product (PROBIOTIC DAILY PO)     No current facility-administered medications for this visit.          VITALS:   /86  Pulse 98  Temp 98.1  F (36.7  C) (Oral)  Resp 20  Ht 5' 7\" (1.702 m)  Wt 204 lb 8 oz (92.8 kg)  SpO2 97%  Breastfeeding? No  BMI 32.03 kg/m2  On exanimation, she is sitting in the chair, comfortable.   AROM of the neck is severely restricted, as she reports pain on ROM.   She notes pain on palpation of the back of the neck/head where she states is the triggering point to produce pain.   Examination of the muscles is not consistent with any taut muscles.   Muscle strength is non focal though with poor effort.     ASSESSMENT/PLAN:    Ada Welch is a 46 year old female with a history of Ehler Danlos Syndrome, Armold chiari malformation, gastric paresis, disequilibrium, cervical instability, mast cell disorder, Chiari malformation, TMJ, and hearing loss, who presents with neck pain.   She does not have the characteristic components of Migraine headaches.   We discussed the EDS and consequent hypermobility and decrease in the tensile strength of the joints and need/importance of muscle strengthening and conditioning. recommend pool therapy for general core strengthening   With regards to the headaches/neck pain, she has an appointment with Dr Moncada for possible C1-2 instability. At this time, Botox injections would further weaken the muscles and cause instability. She notes poor response to steroid injections in the past.    Recommend isometric exercises 5 times a day, in the neck muscles, to condition the neck muscles, weakened by the use of brace.   We discussed various medications aimed at neuropathic pain, she has had adverse responses to these medications and thus could not be pursued.   She is agreeable to the plan of care     Total of 60 min sopent in this encounter, more " than 50% in counseling   Pastora Fraga MD [facility]     Again, thank you for allowing me to participate in the care of your patient.      Sincerely,    Pastora Fraga MD

## 2017-11-02 NOTE — PROGRESS NOTES
CHRONIC MIGRAINE EVALUATION:        REQUESTING PROVIDER: Dr Galeano    CHIEF COMPLAINT:  Chronic migraine headache     HEADACHE PATTERN, HISTORY & PHYSICAL:  Ada Welch is a 46 year old female who presents for Migraine Botox injections evaluation.   Patient has had low back pain for 24 plus years, and history of Tom Danlos syndrome, gastric paresis, disequilibrium, cervical instability, mast cell disorder, Chiari malformation, TMJ, and hearing loss.   She was seen by Dr Chatman and referred here for possible Migraine Botox injections.   Per chart review and discussion with the patient, she was seen by Dr Enciso who had referred her to Rheumatology for evaluation of hypermobility and she reportedly received the formal diagnosis of Tom-Danlos syndrome after a genetic analysis.   She presents to the clinic today and notes that she has pain in the back of the neck, and has increased significantly without any injury in the last few weeks.  Denies any changes bowel and bladder since the Sunday. She has had incontinence of bladder for many years now. No change is noted.   She describes the pain being severe, she wears a neck brace almost all day. She notes that she has c1-c2 instability. Review of the MRI in July of 2017 shows No abnormal enhancement in the spinal cord, thecal sac or cervical Vertebrae, unchanged mild to moderate degenerative changes, most prominent in C4-C7. Moderate right neuroforaminal narrowing at C5-C6.   She notes that epidural and steroids do not help her. The steroids cause more pain.   She reports that she has 'cluster migraines', since 1999.  She notes dizziness when standing   Denies any recent falls. One in July is noted   She reports that she gets migraine headaches daily. Triggered by standing. Grades the pain at 4/10, and can get worsen to 10/10 with tightening. She notes photophobia and nausea. They last for 3-4 hrs if she takes medications and sleep. If she does not take  the medications and not rest it can last for 2-3 days.   She notes a metallic taste in her mouth as auro.   She takes Zomig and maxalt and Imitrex. They cause some relief.   She is applying for disability, started one and half year ago.     The patient did tell me that she has had chronic spine pain and she did have 4 epidural injections which actually made her worse.  She has been using gabapentin and Lyrica on separate occasions and did gain weight and did not have benefit from the medications.  Low back pain for nearly 24 years for which she underwent a L4-S1 fusion (TLIF) on 12/9/2013.  Functionally, she is ambulating without a walker, or a cane. She has crutches and wheel chair. She unable to walk long distances.       Headache Onset History:  Ada Welch has a history of headaches over the last April this year.  She is seeking evaluation now because worsening headaches. Previous evaluation/s include:  consult with neurology, ENT consult and MRI scan.    Headache Family History:  Dad and sister both had history of headaches.    Current Headache Pattern:      Frequency ; every day     Duration of Headache:  3-4 hours to 48 hrs      Aura: no      Associated Symptoms:  nausea, light sensitivity and sound sensitivity    Migraine Complications:  numbness and weakness in fingers and hands        Description of Headache Pain & Location:  sharp pain  posterior neck, back of the head and left side >right side      Level of Pain during usual or 'typical' headache:  5/10      Level of Pain during the worst headache:  10/10    Do headaches interfere with or prevent usual activities or diminish patient's productivity at home or work?  Yes - she is unable to walk and lies on the bed     Non-Pharmacologic Treatments Tried:  (such as food trigger elimination diets, biofeedback, relaxation, physical therapy, chiropractic, etc.)  Neck exercise program, Physical therapy and myofascial release     Medications Tried:    Rescue  Meds:  Decadron (Dexamethasone) Injection    Epidural injections   NSAIDs: Ibuprofen - Advil  Triptan's: Maxalt / Maxalt MLT (Rizatriptan) PO  Imitrex (Sumatriptan) PO, Nasal Spray, SQ  Zomig / Zomig ZMT (Zolmitriptan) PO, Nasal Spray, SQ  Adjunctive Therapy: tizanidine 8 mg TID prn   Prophylactic Drug Treatment: Antiepileptics - amitriptyline   Muscle Relaxants - Zanaflex (Tizanidine)    Emergency Room utilization to treat headache symptoms:  (If so, how often and when was the last time used):  No    Acute Migraine Plan in Place?:  Yes - stays in the dark room and takes medications     Are Headaches worsening over time?  No    What makes headaches better?  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     Chronic sinusitis 00/2007    Diagnosed with chronic pansinusitis 2016     Chronic tonsillitis 1980    Had tonsils removed 02/1981, rheumatic fever     Tom-Danlos disease      Gastroesophageal reflux disease 3/1/2017    I have large hiatal hernia     Hernia, abdominal 10/2016    Hiatal Hernia     Hiatal hernia 2016    Have adeline hiatel hernia     Hoarseness Unsure    It comes and goes     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/2015    Common with Tmo Danlos Syndrome     Migraines 1/1/2007    Unsure of exact date     Nasal polyps 2013    Were revoved 03/2017, benign     Pneumonia Unsure    Have had pneumonia several times     Swelling, mass, or lump in head and neck 08/2016    Lymph node has been growing for last year     Thyroid disease 01/01/2008              Past Surgical History:     Past Surgical History:   Procedure  Laterality Date     ADENOIDECTOMY  1981     AS REPAIR OF NASAL SEPTUM       NASAL/SINUS POLYPECTOMY  2017    Polyps were benign     TONSILLECTOMY       TONSILLECTOMY  1981     TUBAL LIGATION              Social History:     Social History     Social History     Marital status:      Spouse name: Carry     Number of children: 5     Years of education: N/A     Occupational History     Not on file.     Social History Main Topics     Smoking status: Former Smoker     Packs/day: 0.20     Years: 10.00     Types: Cigarettes     Start date: 1/1/1991     Quit date: 12/1/2013     Smokeless tobacco: Never Used      Comment: I smoked on and off during specified dates.     Alcohol use Yes      Comment: couple per year     Drug use: No     Sexual activity: Yes     Partners: Male     Birth control/ protection: Female Surgical     Other Topics Concern     Not on file     Social History Narrative    5 kids: 24, 23, 19, 13, 7 yo     works at [employer]                Family History:     Family History   Problem Relation Age of Onset     Connective Tissue Disorder Mother      eds     Connective Tissue Disorder Sister      eds     CANCER Father      Spinal tumor grew into spinal cord, went in brain     Migraines Father      DIABETES Maternal Grandmother      Elderly diabetes     HEART DISEASE Maternal Grandmother      Hypertension Maternal Grandmother      DIABETES Paternal Grandmother      Elderly diabetes     DIABETES Paternal Grandfather      Elderly diabetes     Asthma Sister      Pediatric Asthma     Migraines Sister      Asthma Son      Pediatric Asthma     Thyroid Disease Sister      ,     Migraines Sister              Immunizations:     There is no immunization history on file for this patient.          Allergies:      Allergies   Allergen Reactions     Bee Venom Anaphylaxis, Difficulty breathing, Palpitations, Shortness Of Breath and Visual Disturbance     Patient does carry  "Epi-Pen     Chicken-Derived Products (Egg) Diarrhea     Other reaction(s): Nausea  Patient will self monitor  Other reaction(s): GI intolerance     Gabapentin      Gluten Meal      Lactose Dermatitis and Diarrhea     Milk Protein Extract      Other reaction(s): GI intolerance\  EGGS      Topiramate      Wheat Diarrhea     Wheat Bran      Other reaction(s): Nausea  Patient will self monitor            Medications:     Current Outpatient Prescriptions   Medication     celecoxib (CELEBREX) 100 MG capsule     buprenorphine (BUTRANS) 10 MCG/HR WK patch     nitroFURantoin, macrocrystal-monohydrate, (MACROBID) 100 MG capsule     nitroFURantoin, macrocrystal-monohydrate, (MACROBID) 100 MG capsule     traMADol (ULTRAM) 50 MG tablet     zonisamide (ZONEGRAN) 25 MG capsule     eletriptan (RELPAX) 20 MG tablet     levothyroxine (SYNTHROID) 25 MCG tablet     amitriptyline (ELAVIL) 10 MG tablet     famotidine (PEPCID) 20 MG tablet     omeprazole (PRILOSEC) 20 MG CR capsule     ondansetron (ZOFRAN-ODT) 4 MG ODT tab     metoclopramide (REGLAN) 5 MG tablet     tiZANidine (ZANAFLEX) 4 MG tablet     EPINEPHrine (EPIPEN/ADRENACLICK/OR ANY BX GENERIC EQUIV) 0.3 MG/0.3ML injection 2-pack     albuterol (PROAIR HFA/PROVENTIL HFA/VENTOLIN HFA) 108 (90 BASE) MCG/ACT Inhaler     fluticasone (FLONASE) 50 MCG/ACT spray     etodolac (LODINE) 400 MG tablet     COMPRESSION STOCKINGS     SUMAtriptan (IMITREX STATDOSE) 6 MG/0.5ML pen injector kit     ketamine, KETALAR, 5%-gabapentin, NEURONTIN, 8%-lidocaine 2.5% 5%-8% GEL topical PLO cream     fluticasone-salmeterol (ADVAIR DISKUS) 250-50 MCG/DOSE diskus inhaler     cetirizine (ZYRTEC) 10 MG tablet     polyethylene glycol (MIRALAX/GLYCOLAX) powder     Multiple Vitamins-Minerals (MULTIVITAMIN PO)     Probiotic Product (PROBIOTIC DAILY PO)     No current facility-administered medications for this visit.          VITALS:   /86  Pulse 98  Temp 98.1  F (36.7  C) (Oral)  Resp 20  Ht 5' 7\" (1.702 " m)  Wt 204 lb 8 oz (92.8 kg)  SpO2 97%  Breastfeeding? No  BMI 32.03 kg/m2  On exanimation, she is sitting in the chair, comfortable.   AROM of the neck is severely restricted, as she reports pain on ROM.   She notes pain on palpation of the back of the neck/head where she states is the triggering point to produce pain.   Examination of the muscles is not consistent with any taut muscles.   Muscle strength is non focal though with poor effort.     ASSESSMENT/PLAN:    Aad Welch is a 46 year old female with a history of Ehler Danlos Syndrome, Armold chiari malformation, gastric paresis, disequilibrium, cervical instability, mast cell disorder, Chiari malformation, TMJ, and hearing loss, who presents with neck pain.   She does not have the characteristic components of Migraine headaches.   We discussed the EDS and consequent hypermobility and decrease in the tensile strength of the joints and need/importance of muscle strengthening and conditioning. recommend pool therapy for general core strengthening   With regards to the headaches/neck pain, she has an appointment with Dr Moncada for possible C1-2 instability. At this time, Botox injections would further weaken the muscles and cause instability. She notes poor response to steroid injections in the past.    Recommend isometric exercises 5 times a day, in the neck muscles, to condition the neck muscles, weakened by the use of brace.   We discussed various medications aimed at neuropathic pain, she has had adverse responses to these medications and thus could not be pursued.   She is agreeable to the plan of care     Total of 60 min sopent in this encounter, more than 50% in counseling   Pastora Fraga MD A

## 2017-11-02 NOTE — PROGRESS NOTES
PT orders for pool therapy was faxed to [facility] [phone]. Patient has a copy of the orders.

## 2017-11-02 NOTE — MR AVS SNAPSHOT
After Visit Summary   11/2/2017    Ada Welch    MRN: [id number]           Patient Information     Date Of Birth          1970        Visit Information        Provider Department      11/2/2017 11:00 AM Pastora Fraga MD [facility] Physical Medicine and Rehabilitation        Today's Diagnoses     Physical debility    -  1       Follow-ups after your visit        Additional Services     PHYSICAL THERAPY REFERRAL       Treatment: Evaluation & Treatment  Special Instructions/Modalities: core strengthening   She has history of Ehler Donlas syndrome   Special Programs: POOL THERAPY   Please be aware that coverage of these services is subject to the terms and limitations of your health insurance plan.  Call member services at your health plan with any benefit or coverage questions.                  Follow-up notes from your care team     Return if symptoms worsen or fail to improve.      Your next 10 appointments already scheduled     Nov 07, 2017  9:30 AM CST   (Arrive by 9:15 AM)   EMG with Jorge Pickard MD   [facility] EMG ([facility])    [address] [place] [id number] [phone]           Do not use lotions or creams on the area to be tested. If you are on blood thinners (Warfarin, Coumadin, Lovenox, etc), please contact your primary care physician to check if it is safe to stop them 3 days prior to testing. If you have anxiety, please check with your referring physician to obtain anti-anxiety medication for the procedure.            Nov 13, 2017 10:30 AM CST   (Arrive by 10:15 AM)   Return Visit with Ellyn Rivera MD   [facility] ([facility])    [address] [place] [address] [phone]            Nov 22, 2017 11:00 AM CST   (Arrive by 10:45 AM)   New Patient Visit with Juana Griffith MD   [facility]ology [facility]  ([facility])    [address] [address]   [phone]            Nov 30, 2017  9:30 AM CST   (Arrive by 9:15 AM)   New Patient Visit with Bertha Moncada MD   [facility] Neurosurgery ([facility])    [address] [place] [id number]   [phone]            Dec 06, 2017 11:15 AM CST   (Arrive by 11:00 AM)   Return Visit with Wade Singh MD   [facility] ([facility])    [address] [place] [id number] [phone]            Dec 09, 2017  9:10 AM CST   (Arrive by 8:55 AM)   Return Visit with Seth Galeano MD   [facility] Neurology ([facility])    [address] [place] [id number] [phone]            Feb 05, 2018 11:00 AM CST   (Arrive by 10:45 AM)   Implantable Loop Recorder with Uc Cv Device 1   [facility] ([facility])    [address] [place] [id number] [phone]            Feb 05, 2018 11:30 AM CST   (Arrive by 11:15 AM)   RETURN ARRHYTHMIA with FIDELINA Blanc [facility] ([facility])    [address] [place] [id number] [phone]              Who to contact     Please call your clinic at [phone] to:    Ask questions about your health    Make or cancel appointments    Discuss your medicines    Learn about your test results    Speak to your doctor   If you have compliments or concerns about an experience at your clinic, or if you wish to file a complaint, please contact [facility] Patient Relations at [phone] or email us at [email or web address]         Additional Information About Your Visit        MyChart Information     MyChart gives you  "secure access to your electronic health record. If you see a primary care provider, you can also send messages to your care team and make appointments. If you have questions, please call your primary care clinic.  If you do not have a primary care provider, please call [phone] and they will assist you.      [employer] is an electronic gateway that provides easy, online access to your medical records. With [employer], you can request a clinic appointment, read your test results, renew a prescription or communicate with your care team.     To access your existing account, please contact your [facility] or call [phone] for assistance.        Care EveryWhere ID     This is your Care EveryWhere ID. This could be used by other organizations to access your [facility] medical records  [id number]        Your Vitals Were     Pulse Temperature Respirations Height Pulse Oximetry Breastfeeding?    98 98.1  F (36.7  C) (Oral) 20 5' 7\" (1.702 m) 97% No    BMI (Body Mass Index)                   32.03 kg/m2            Blood Pressure from Last 3 Encounters:   11/02/17 120/86   10/27/17 108/71   10/20/17 102/68    Weight from Last 3 Encounters:   11/02/17 204 lb 8 oz (92.8 kg)   10/27/17 200 lb 6.4 oz (90.9 kg)   10/20/17 200 lb 6.4 oz (90.9 kg)              We Performed the Following     PHYSICAL THERAPY REFERRAL        Primary Care Provider Office Phone # Fax #    Ellyn Rivera [phone]       [address] [place] [address] [address] [place], [place] [id number]        Equal Access to Services     LETA [facility] : Hadii aad ku hadasho Soomaali, waaxda luqadaha, qaybta kaalmada odette arellano. So [facility] [phone].    ATENCIÓN: Si habla español, tiene a sweeney disposición servicios gratuitos de asistencia lingüística. Llame al [phone].    We comply with applicable federal civil rights laws and [place] laws. We do not discriminate on the basis of race, color, " national origin, age, disability, sex, sexual orientation, or gender identity.            Thank you!     Thank you for choosing [facility]YSICAL [facility]ICINE [facility]ABILITATION  for your care. Our goal is always to provide you with excellent care. Hearing back from our patients is one way we can continue to improve our services. Please take a few minutes to complete the written survey that you may receive in the mail after your visit with us. Thank you!             Your Updated Medication List - Protect others around you: Learn how to safely use, store and throw away your medicines at www.disposemymeds.org.          This list is accurate as of: 11/2/17 12:37 PM.  Always use your most recent med list.                   Brand Name Dispense Instructions for use Diagnosis    ADVAIR DISKUS 250-50 MCG/DOSE diskus inhaler   Generic drug:  fluticasone-salmeterol      Inhale 1 puff into the lungs 2 times daily as needed        albuterol 108 (90 BASE) MCG/ACT Inhaler    PROAIR HFA/PROVENTIL HFA/VENTOLIN HFA     Inhale 2 puffs into the lungs        amitriptyline 10 MG tablet    ELAVIL    90 tablet    Take 3 tablets (30 mg) by mouth At Bedtime    Chronic migraine without aura without status migrainosus, not intractable       buprenorphine 10 MCG/HR WK patch    BUTRANS    4 patch    Place 1 patch onto the skin every 7 days    Postlaminectomy syndrome of lumbar region       celecoxib 100 MG capsule    celeBREX    60 capsule    Take 1 capsule (100 mg) by mouth 2 times daily    Lumbar post-laminectomy syndrome       cetirizine 10 MG tablet    zyrTEC     Take 10 mg by mouth daily        COMPRESSION STOCKINGS     3 each    1 each daily 20-30 mmHg Thigh Length measure and fit, color and style per patient preference. Doshar n jose per need.    Orthostatic hypotension       eletriptan 20 MG tablet    RELPAX    18 tablet    Take 1 tablet (20 mg) by mouth at onset of headache for migraine May repeat in 2 hours. Max 4 tablets/24 hours.     Chronic daily headache, Intractable migraine without aura and with status migrainosus       EPINEPHrine 0.3 MG/0.3ML injection 2-pack    EPIPEN/ADRENACLICK/or ANY BX GENERIC EQUIV     Inject 0.3 mg into the muscle        etodolac 400 MG tablet    LODINE     Take 400 mg by mouth        famotidine 20 MG tablet    PEPCID    90 tablet    Take 1 tablet (20 mg) by mouth At Bedtime    Hiatal hernia, Mast cell disorder       fluticasone 50 MCG/ACT spray    FLONASE     2 sprays        ketamine (KETALAR) 5%-gabapentin (NEURONTIN) 8%-lidocaine 2.5% 5%-8% Gel topical PLO cream     30 g    Apply 1 g topically 3 times daily as needed    Postlaminectomy syndrome of lumbosacral region, Postlaminectomy syndrome of lumbar region       metoclopramide 5 MG tablet    REGLAN    20 tablet    Take 1 tablet (5 mg) by mouth every 6 hours as needed    Intractable chronic migraine without aura and with status migrainosus       MULTIVITAMIN PO      Take by mouth daily        * nitroFURantoin (macrocrystal-monohydrate) 100 MG capsule    MACROBID    10 capsule    Take 1 capsule (100 mg) by mouth 2 times daily for 5 days    Urinary tract infection       * nitroFURantoin (macrocrystal-monohydrate) 100 MG capsule    MACROBID    10 capsule    Take 1 capsule (100 mg) by mouth 2 times daily    Urinary tract infection       omeprazole 20 MG CR capsule    priLOSEC    90 capsule    Take 1 capsule (20 mg) by mouth daily    Hiatal hernia       ondansetron 4 MG ODT tab    ZOFRAN-ODT    60 tablet    Take 1-2 tablets (4-8 mg) by mouth every 12 hours as needed for nausea    Migraine without status migrainosus, not intractable, unspecified migraine type       polyethylene glycol powder    MIRALAX/GLYCOLAX     1 capful Take 1 capful by mouth daily        PROBIOTIC DAILY PO      Take by mouth 2 times daily 2 packets daily        SUMAtriptan 6 MG/0.5ML pen injector kit    IMITREX STATDOSE    2 kit    Inject 0.5 mLs (6 mg) Subcutaneous at onset of headache for  migraine (may repeat once after 2 hrs) May repeat in 1 hour. Max 12 mg/24 hours.    Chronic migraine without aura without status migrainosus, not intractable       SYNTHROID 25 MCG tablet   Generic drug:  levothyroxine      Take 25 mcg by mouth daily        tiZANidine 4 MG tablet    ZANAFLEX    90 tablet    Take 2 tablets (8 mg) by mouth 3 times daily as needed for muscle spasms    Post laminectomy syndrome       traMADol 50 MG tablet    ULTRAM    30 tablet    Take 1 tablet (50 mg) by mouth every 6 hours as needed for pain maximum 4 tablet(s) per day    Lumbar post-laminectomy syndrome       zonisamide 25 MG capsule    Zonegran    70 capsule    Take 1 tablet (25 mg) once daily for 1 week, then 2 tablets once daily for 1 week, then 3 tablets once daily for 1 week, then 4 tablets once daily for 1 week.    Occipital neuritis       * Notice:  This list has 2 medication(s) that are the same as other medications prescribed for you. Read the directions carefully, and ask your doctor or other care provider to review them with you.

## 2017-11-03 NOTE — NURSING NOTE
Chief Complaints and History of Present Illnesses   Patient presents with     Neurologic Evaluation     New patient for scintillating scotoma and synesthesia     HPI    Symptoms:              Comments:  New patient referred by Dr. Márquez for scintillating scotoma both eyes and synesthesia.    Patient reports vision is poor in both eyes. Patient lost glasses when moved from June 2017. Patient states glasses do help vision.   Patient states floaters, wavy lines and diplopia and dry eye in both eyes. Patient also states she is photophobic.   Patient also notes tic in both upper eyelid.   Patient states chronic migraines.   MRI August 2017: patient states was diagnosed with Chiari Malformation.   LUIS Rascon 11/1/2017 10:37 AM

## 2017-11-07 ENCOUNTER — OFFICE VISIT (OUTPATIENT)
Dept: NEUROLOGY | Facility: CLINIC | Age: 47
End: 2017-11-07

## 2017-11-07 DIAGNOSIS — R94.131: ICD-10-CM

## 2017-11-07 DIAGNOSIS — M54.2 CERVICALGIA: Primary | ICD-10-CM

## 2017-11-07 NOTE — MR AVS SNAPSHOT
After Visit Summary   11/7/2017    Ada Welch    MRN: [id number]           Patient Information     Date Of Birth          1970        Visit Information        Provider Department      11/7/2017 9:30 AM Jorge Pickard MD [facility] EMG        Today's Diagnoses     Cervicalgia    -  1    Abnormal spontaneous activity on electromyography           Follow-ups after your visit        Your next 10 appointments already scheduled     Nov 13, 2017 10:30 AM CST   (Arrive by 10:15 AM)   Return Visit with Ellyn Rivera MD   [facility] Primary Care Clinic ([facility])    [address] [place] [address]  [address] [facility] [address]   [phone]            Nov 22, 2017 11:00 AM CST   (Arrive by 10:45 AM)   New Patient Visit with Juana Griffith MD   [facility] Gastroenterology and IBD Clinic ([facility])    [address] [place] [address]  [address] [facility] [address]   [phone]            Nov 30, 2017  9:30 AM CST   (Arrive by 9:15 AM)   New Patient Visit with Bertha Moncada MD   [facility] Neurosurgery ([facility])    [address] [place] [address]  [address] [facility] [address]   [phone]            Dec 06, 2017 11:15 AM CST   (Arrive by 11:00 AM)   Return Visit with Wade Singh MD   [facility] Urology and Inst for Prostate and Urologic Cancers ([facility])    [address] [place] [address]  [address] [facility] [address]   [phone]            Dec 09, 2017  9:10 AM CST   (Arrive by 8:55 AM)   Return Visit with Seth Galeano MD   [facility] Neurology ([facility])    [address] [place] [address]  [address] [facility] [address]   [phone]            Feb 05, 2018 11:00 AM CST   (Arrive by 10:45 AM)   Implantable Loop Recorder with Uc Cv Device 1   [facility] Heart Care ([facility])    [address] [place] [address]  Floor  [facility] [id number] [phone]            Feb 05, 2018 11:30 AM CST   (Arrive by 11:15 AM)   RETURN ARRHYTHMIA with FIDELINA Blanc [facility] ([facility])    [address]  3rd Floor  [facility] [id number] [phone]              Future tests that were ordered for you today     Open Future Orders        Priority Expected Expires Ordered    Needle EMG Each Extremity w/Paraspinal Area Complete (41974) Routine  11/7/2018 11/7/2017            Who to contact     Please call your clinic at [phone] to:    Ask questions about your health    Make or cancel appointments    Discuss your medicines    Learn about your test results    Speak to your doctor   If you have compliments or concerns about an experience at your clinic, or if you wish to file a complaint, please contact [facility] Patient Relations at [phone] or email us at [email or web address]         Additional Information About Your Visit        [employer] Information     Miner gives you secure access to your electronic health record. If you see a primary care provider, you can also send messages to your care team and make appointments. If you have questions, please call your primary care clinic.  If you do not have a primary care provider, please call [phone] and they will assist you.      Miner is an electronic gateway that provides easy, online access to your medical records. With [employer]er, you can request a clinic appointment, read your test results, renew a prescription or communicate with your care team.     To access your existing account, please contact your [facility] or call [phone] for assistance.        Care EveryWhere ID     This is your Care EveryWhere ID. This could be used by other organizations to access your [facility] medical records  [id number]         Blood Pressure from  Last 3 Encounters:   11/02/17 120/86   10/27/17 108/71   10/20/17 102/68    Weight from Last 3 Encounters:   11/02/17 92.8 kg (204 lb 8 oz)   10/27/17 90.9 kg (200 lb 6.4 oz)   10/20/17 90.9 kg (200 lb 6.4 oz)              We Performed the Following     HC NCS MOTOR W OR W/O F-WAVE, 7 OR 8        Primary Care Provider Office Phone # Fax #    [place] [phone]       [address] [place] [address]  [facility] [address]        Equal Access to Services     [facility]: Hadii kia Slater, waamie oakes, analilia brucemadonnell arellano, odette tang. So [facility] [phone].    ATENCIÓN: Si habla español, tiene a sweeney disposición servicios gratuitos de asistencia lingüística. [facility] [phone].    We comply with applicable federal civil rights laws and [place] laws. We do not discriminate on the basis of race, color, national origin, age, disability, sex, sexual orientation, or gender identity.            Thank you!     Thank you for choosing [facility]  for your care. Our goal is always to provide you with excellent care. Hearing back from our patients is one way we can continue to improve our services. Please take a few minutes to complete the written survey that you may receive in the mail after your visit with us. Thank you!             Your Updated Medication List - Protect others around you: Learn how to safely use, store and throw away your medicines at www.disposemymeds.org.          This list is accurate as of: 11/7/17 10:08 AM.  Always use your most recent med list.                   Brand Name Dispense Instructions for use Diagnosis    ADVAIR DISKUS 250-50 MCG/DOSE diskus inhaler   Generic drug:  fluticasone-salmeterol      Inhale 1 puff into the lungs 2 times daily as needed        albuterol 108 (90 BASE) MCG/ACT Inhaler    PROAIR HFA/PROVENTIL HFA/VENTOLIN HFA     Inhale 2 puffs into the lungs        amitriptyline 10 MG tablet    ELAVIL    90  tablet    Take 3 tablets (30 mg) by mouth At Bedtime    Chronic migraine without aura without status migrainosus, not intractable       buprenorphine 10 MCG/HR WK patch    BUTRANS    4 patch    Place 1 patch onto the skin every 7 days    Postlaminectomy syndrome of lumbar region       celecoxib 100 MG capsule    celeBREX    60 capsule    Take 1 capsule (100 mg) by mouth 2 times daily    Lumbar post-laminectomy syndrome       cetirizine 10 MG tablet    zyrTEC     Take 10 mg by mouth daily        COMPRESSION STOCKINGS     3 each    1 each daily 20-30 mmHg Thigh Length measure and fit, color and style per patient preference. Doff n jose per need.    Orthostatic hypotension       eletriptan 20 MG tablet    RELPAX    18 tablet    Take 1 tablet (20 mg) by mouth at onset of headache for migraine May repeat in 2 hours. Max 4 tablets/24 hours.    Chronic daily headache, Intractable migraine without aura and with status migrainosus       EPINEPHrine 0.3 MG/0.3ML injection 2-pack    EPIPEN/ADRENACLICK/or ANY BX GENERIC EQUIV     Inject 0.3 mg into the muscle        etodolac 400 MG tablet    LODINE     Take 400 mg by mouth        famotidine 20 MG tablet    PEPCID    90 tablet    Take 1 tablet (20 mg) by mouth At Bedtime    Hiatal hernia, Mast cell disorder       fluticasone 50 MCG/ACT spray    FLONASE     2 sprays        ketamine (KETALAR) 5%-gabapentin (NEURONTIN) 8%-lidocaine 2.5% 5%-8% Gel topical PLO cream     30 g    Apply 1 g topically 3 times daily as needed    Postlaminectomy syndrome of lumbosacral region, Postlaminectomy syndrome of lumbar region       metoclopramide 5 MG tablet    REGLAN    20 tablet    Take 1 tablet (5 mg) by mouth every 6 hours as needed    Intractable chronic migraine without aura and with status migrainosus       MULTIVITAMIN PO      Take by mouth daily        nitroFURantoin (macrocrystal-monohydrate) 100 MG capsule    MACROBID    10 capsule    Take 1 capsule (100 mg) by mouth 2 times daily     Urinary tract infection       omeprazole 20 MG CR capsule    priLOSEC    90 capsule    Take 1 capsule (20 mg) by mouth daily    Hiatal hernia       ondansetron 4 MG ODT tab    ZOFRAN-ODT    60 tablet    Take 1-2 tablets (4-8 mg) by mouth every 12 hours as needed for nausea    Migraine without status migrainosus, not intractable, unspecified migraine type       polyethylene glycol powder    MIRALAX/GLYCOLAX     1 capful Take 1 capful by mouth daily        PROBIOTIC DAILY PO      Take by mouth 2 times daily 2 packets daily        SUMAtriptan 6 MG/0.5ML pen injector kit    IMITREX STATDOSE    2 kit    Inject 0.5 mLs (6 mg) Subcutaneous at onset of headache for migraine (may repeat once after 2 hrs) May repeat in 1 hour. Max 12 mg/24 hours.    Chronic migraine without aura without status migrainosus, not intractable       SYNTHROID 25 MCG tablet   Generic drug:  levothyroxine      Take 25 mcg by mouth daily        tiZANidine 4 MG tablet    ZANAFLEX    90 tablet    Take 2 tablets (8 mg) by mouth 3 times daily as needed for muscle spasms    Post laminectomy syndrome       traMADol 50 MG tablet    ULTRAM    30 tablet    Take 1 tablet (50 mg) by mouth every 6 hours as needed for pain maximum 4 tablet(s) per day    Lumbar post-laminectomy syndrome       zonisamide 25 MG capsule    Zonegran    70 capsule    Take 1 tablet (25 mg) once daily for 1 week, then 2 tablets once daily for 1 week, then 3 tablets once daily for 1 week, then 4 tablets once daily for 1 week.    Occipital neuritis

## 2017-11-07 NOTE — PROGRESS NOTES
"    [facility]  Electrodiagnostic Laboratory    Nerve Conduction & EMG Report          Patient: Ada Welch  YOB: 1970  Patient ID: [id number]  Age: 46 Years 10 Months  Gender: Female      Referring Physician: Seth Galeano MD    History & Examination:    46 year old woman with cervicalgia, bilateral shoulder pain, and pain occasionally radiating from the neck to the hands on both sides, with intermittent paresthesias in all digits. Query cervical radiculopathies vs entrapment neuropathies of the upper extremities.    Techniques: Motor and sensory conduction studies were done with surface recording electrodes. Temperature was monitored and recorded throughout the study. Upper extremities were maintained at a temperature of 32 degrees Centigrade or higher.EMG was done with a concentric needle electrode.     Results:    Bilateral median and ulnar antidromic sensory and motor NCSs were normal. Right median and left ulnar F-wave latencies were normal. EMG of left cervical paraspinal muscles (approximately at C7) showed 1+ positive waves/fibs and a couple of trains of high frequency \"pseudomyotonic\" discharges. EMG of selected muscles of bilateral upper extremities, and the right cervical paraspinals, was normal, as tabulated.    Interpretation:    Abnormal spontaneous activity restricted to the left cervical paraspinal muscles. This finding may be due to left cervical radiculopathy, focal muscle injury or axial myopathy, but, when isolated, it is not diagnostic for any of the above entities. Clinical and imaging correlation is required. Otherwise, the study was normal and showed no evidence of carpal tunnel syndrome, ulnar neuropathy, or cervical radiculopathy on either side.    EMG Physician:    Jorge Pickard MD           Sensory NCS      Nerve / Sites Rec. Site Onset Peak NP Amp Ref. PP Amp Dist Donnell Ref. Temp     ms ms  V  V  V cm m/s m/s  C   R MEDIAN - Dig II Anti      Wrist " Dig II 2.50 3.28 40.5 10.0 59.8 14 56.0 48.0 32.4   L MEDIAN - Dig II Anti      Wrist Dig II 2.66 3.54 40.7 10.0 59.5 14 52.7 48.0 32.2   R ULNAR - Dig V Anti      Wrist Dig V 2.19 2.71 30.2 8.0 71.0 12.5 57.1 48.0 32.7   L ULNAR - Dig V Anti      Wrist Dig V 2.08 2.86 44.0 8.0 80.8 12.5 60.0 48.0 32.2       Motor NCS      Nerve / Sites Rec. Site Lat Ref. Amp Ref. Rel Amp Dist Donnell Ref. Dur. Area Temp.     ms ms mV mV % cm m/s m/s ms %  C   R MEDIAN - APB      Wrist APB 3.18 4.40 11.7 5.0 100 8   5.10 100 32.7      Elbow APB 7.29  11.0  94.2 22 53.5 48.0 5.57 97.8 32.7   L MEDIAN - APB      Wrist APB 3.39 4.40 8.7 5.0 100 8   5.73 100 32.2      Elbow APB 7.24  8.2  94.5 24 62.3 48.0 6.04 95.4 32.2   R ULNAR - ADM      Wrist ADM 2.34 3.50 11.7 5.0 100 8   4.95 100 32.6      B.Elbow ADM 5.89  11.4  97.1 20 56.5 48.0 5.21 101 32.6      A.Elbow ADM 7.34  11.5  97.8 9 61.7 48.0 5.26 96.1 32.5   L ULNAR - ADM      Wrist ADM 2.08 3.50 11.9 5.0 100 8   5.89 100 32.1      B.Elbow ADM 5.63  11.4  95.7 20 56.5 48.0 7.24 102 32.2      A.Elbow ADM 7.14  11.3  94.7 9 59.6 48.0 7.14 98 32.2       F  Wave      Nerve Min F Lat Max F Lat Mean FLat Temp.    ms ms ms  C   R MEDIAN 26.88 27.92 27.49 32.7   L ULNAR 25.94 29.06 27.01 32       EMG Summary Table     Spontaneous MUAP Recruitment    IA Fib Fasc H.F. Amp Dur. PPP Pattern   L. FIRST D INTEROSS Normal None None None N N None Normal   L. TRICEPS Normal None None None N N None Normal   L. DELTOID Normal None None None N N None Normal   L. BICEPS Normal None None None N N None Normal   L. PRON TERES Normal None None None N N None Normal   L. C7 PSP Increased 1+ None Pseudomyotonia N N None Normal   R. FIRST D INTEROSS Normal None None None N N None Normal   R. TRICEPS Normal None None None N N None Normal   R. DELTOID Normal None None None N N None Normal   R. BICEPS Normal None None None N N None Normal   R. PRON TERES Normal None None None N N None Normal   R. C7 PSP Normal None  None None N N None Normal

## 2017-11-07 NOTE — LETTER
"11/7/2017     RE: Ada Welch  [address] [place] [address]     Dear Colleague,    Thank you for referring your patient, Ada Welch, to the [facility] EMG at [facility]. Please see a copy of my visit note below.        [facility]  Electrodiagnostic Laboratory    Nerve Conduction & EMG Report          Patient: Ada Welch  YOB: 1970  Patient ID: [id number]  Age: 46 Years 10 Months  Gender: Female      Referring Physician: Seth Galeano MD    History & Examination:    46 year old woman with cervicalgia, bilateral shoulder pain, and pain occasionally radiating from the neck to the hands on both sides, with intermittent paresthesias in all digits. Query cervical radiculopathies vs entrapment neuropathies of the upper extremities.    Techniques: Motor and sensory conduction studies were done with surface recording electrodes. Temperature was monitored and recorded throughout the study. Upper extremities were maintained at a temperature of 32 degrees Centigrade or higher.EMG was done with a concentric needle electrode.     Results:    Bilateral median and ulnar antidromic sensory and motor NCSs were normal. Right median and left ulnar F-wave latencies were normal. EMG of left cervical paraspinal muscles (approximately at C7) showed 1+ positive waves/fibs and a couple of trains of high frequency \"pseudomyotonic\" discharges. EMG of selected muscles of bilateral upper extremities, and the right cervical paraspinals, was normal, as tabulated.    Interpretation:    Abnormal spontaneous activity restricted to the left cervical paraspinal muscles. This finding may be due to left cervical radiculopathy, focal muscle injury or axial myopathy, but, when isolated, it is not diagnostic for any of the above entities. Clinical and imaging correlation is required. Otherwise, the study was normal and showed no evidence of carpal tunnel syndrome, ulnar " neuropathy, or cervical radiculopathy on either side.    EMG Physician:    Jorge Pickard MD           Sensory NCS      Nerve / Sites Rec. Site Onset Peak NP Amp Ref. PP Amp Dist Donnell Ref. Temp     ms ms  V  V  V cm m/s m/s  C   R MEDIAN - Dig II Anti      Wrist Dig II 2.50 3.28 40.5 10.0 59.8 14 56.0 48.0 32.4   L MEDIAN - Dig II Anti      Wrist Dig II 2.66 3.54 40.7 10.0 59.5 14 52.7 48.0 32.2   R ULNAR - Dig V Anti      Wrist Dig V 2.19 2.71 30.2 8.0 71.0 12.5 57.1 48.0 32.7   L ULNAR - Dig V Anti      Wrist Dig V 2.08 2.86 44.0 8.0 80.8 12.5 60.0 48.0 32.2       Motor NCS      Nerve / Sites Rec. Site Lat Ref. Amp Ref. Rel Amp Dist Donnell Ref. Dur. Area Temp.     ms ms mV mV % cm m/s m/s ms %  C   R MEDIAN - APB      Wrist APB 3.18 4.40 11.7 5.0 100 8   5.10 100 32.7      Elbow APB 7.29  11.0  94.2 22 53.5 48.0 5.57 97.8 32.7   L MEDIAN - APB      Wrist APB 3.39 4.40 8.7 5.0 100 8   5.73 100 32.2      Elbow APB 7.24  8.2  94.5 24 62.3 48.0 6.04 95.4 32.2   R ULNAR - ADM      Wrist ADM 2.34 3.50 11.7 5.0 100 8   4.95 100 32.6      B.Elbow ADM 5.89  11.4  97.1 20 56.5 48.0 5.21 101 32.6      A.Elbow ADM 7.34  11.5  97.8 9 61.7 48.0 5.26 96.1 32.5   L ULNAR - ADM      Wrist ADM 2.08 3.50 11.9 5.0 100 8   5.89 100 32.1      B.Elbow ADM 5.63  11.4  95.7 20 56.5 48.0 7.24 102 32.2      A.Elbow ADM 7.14  11.3  94.7 9 59.6 48.0 7.14 98 32.2       F  Wave      Nerve Min F Lat Max F Lat Mean FLat Temp.    ms ms ms  C   R MEDIAN 26.88 27.92 27.49 32.7   L ULNAR 25.94 29.06 27.01 32       EMG Summary Table     Spontaneous MUAP Recruitment    IA Fib Fasc H.F. Amp Dur. PPP Pattern   L. FIRST D INTEROSS Normal None None None N N None Normal   L. TRICEPS Normal None None None N N None Normal   L. DELTOID Normal None None None N N None Normal   L. BICEPS Normal None None None N N None Normal   L. PRON TERES Normal None None None N N None Normal   L. C7 PSP Increased 1+ None Pseudomyotonia N N None Normal   R. FIRST D INTEROSS  Normal None None None N N None Normal   R. TRICEPS Normal None None None N N None Normal   R. DELTOID Normal None None None N N None Normal   R. BICEPS Normal None None None N N None Normal   R. PRON TERES Normal None None None N N None Normal   R. C7 PSP Normal None None None N N None Normal                            Again, thank you for allowing me to participate in the care of your patient.      Sincerely,    Jorge Pickard MD

## 2017-11-15 ENCOUNTER — TELEPHONE (OUTPATIENT)
Dept: SLEEP MEDICINE | Facility: CLINIC | Age: 47
End: 2017-11-15

## 2017-11-15 NOTE — TELEPHONE ENCOUNTER
Left a message for patient to contact sleep center to schedule sleep study ordered by Bennett Goltz, outstanding sleep study order mailed to patient.

## 2017-11-22 ENCOUNTER — OFFICE VISIT (OUTPATIENT)
Dept: GASTROENTEROLOGY | Facility: CLINIC | Age: 47
End: 2017-11-22

## 2017-11-22 VITALS
HEART RATE: 90 BPM | TEMPERATURE: 98.3 F | BODY MASS INDEX: 32.35 KG/M2 | HEIGHT: 67 IN | SYSTOLIC BLOOD PRESSURE: 131 MMHG | OXYGEN SATURATION: 95 % | WEIGHT: 206.1 LBS | DIASTOLIC BLOOD PRESSURE: 83 MMHG

## 2017-11-22 DIAGNOSIS — R14.3 FLATULENCE, ERUCTATION, AND GAS PAIN: ICD-10-CM

## 2017-11-22 DIAGNOSIS — R14.2 FLATULENCE, ERUCTATION, AND GAS PAIN: ICD-10-CM

## 2017-11-22 DIAGNOSIS — K59.04 CHRONIC IDIOPATHIC CONSTIPATION: ICD-10-CM

## 2017-11-22 DIAGNOSIS — R14.1 FLATULENCE, ERUCTATION, AND GAS PAIN: ICD-10-CM

## 2017-11-22 DIAGNOSIS — Q79.60 EHLERS-DANLOS SYNDROME: Primary | ICD-10-CM

## 2017-11-22 RX ORDER — THYROID 60 MG/1
60 TABLET ORAL
COMMUNITY
End: 2017-12-09

## 2017-11-22 ASSESSMENT — ENCOUNTER SYMPTOMS
TINGLING: 1
POOR WOUND HEALING: 1
FATIGUE: 1
EYE WATERING: 0
ALTERED TEMPERATURE REGULATION: 1
PALPITATIONS: 1
NECK PAIN: 1
SINUS PAIN: 1
BLOATING: 1
ARTHRALGIAS: 1
HEMATURIA: 1
MUSCLE WEAKNESS: 1
CHILLS: 1
NAUSEA: 1
LEG PAIN: 1
NECK MASS: 1
WEIGHT GAIN: 1
HYPOTENSION: 1
NUMBNESS: 1
DIFFICULTY URINATING: 1
NIGHT SWEATS: 0
VOMITING: 1
BACK PAIN: 1
BRUISES/BLEEDS EASILY: 1
LIGHT-HEADEDNESS: 1
FEVER: 0
EYE PAIN: 0
NUMBNESS: 1
ARTHRALGIAS: 1
SKIN CHANGES: 0
DISTURBANCES IN COORDINATION: 1
POSTURAL DYSPNEA: 1
DIZZINESS: 1
STIFFNESS: 1
SLEEP DISTURBANCES DUE TO BREATHING: 0
HEARTBURN: 0
SINUS CONGESTION: 1
BLOOD IN STOOL: 1
LIGHT-HEADEDNESS: 1
SWOLLEN GLANDS: 1
LEG PAIN: 1
EYE REDNESS: 1
NIGHT SWEATS: 0
DIARRHEA: 1
SPEECH CHANGE: 0
SPUTUM PRODUCTION: 1
DIFFICULTY URINATING: 1
EYE IRRITATION: 1
HEMATURIA: 1
HALLUCINATIONS: 0
NAIL CHANGES: 0
MUSCLE WEAKNESS: 1
POLYDIPSIA: 1
SNORES LOUDLY: 0
SNORES LOUDLY: 0
WEAKNESS: 1
TROUBLE SWALLOWING: 1
HEMOPTYSIS: 0
TASTE DISTURBANCE: 0
PARALYSIS: 0
SLEEP DISTURBANCES DUE TO BREATHING: 0
DYSPNEA ON EXERTION: 1
HEADACHES: 1
SINUS PAIN: 1
EYE WATERING: 0
SPUTUM PRODUCTION: 1
DIARRHEA: 1
ABDOMINAL PAIN: 1
NAIL CHANGES: 0
DECREASED APPETITE: 1
TINGLING: 1
HYPERTENSION: 0
LOSS OF CONSCIOUSNESS: 1
SYNCOPE: 0
FATIGUE: 1
MEMORY LOSS: 1
HYPERTENSION: 0
INCREASED ENERGY: 0
PALPITATIONS: 1
CONSTIPATION: 1
NECK PAIN: 1
SWOLLEN GLANDS: 1
HEMOPTYSIS: 0
HALLUCINATIONS: 0
COUGH DISTURBING SLEEP: 1
WEAKNESS: 1
FLANK PAIN: 1
FEVER: 0
POSTURAL DYSPNEA: 1
SHORTNESS OF BREATH: 1
HOARSE VOICE: 1
WEIGHT LOSS: 0
BOWEL INCONTINENCE: 0
DYSURIA: 1
SYNCOPE: 0
EXERCISE INTOLERANCE: 1
EXERCISE INTOLERANCE: 1
HEARTBURN: 0
ALTERED TEMPERATURE REGULATION: 1
HEADACHES: 1
FLANK PAIN: 1
RECTAL PAIN: 0
EYE PAIN: 0
HYPOTENSION: 1
SEIZURES: 0
TREMORS: 1
COUGH DISTURBING SLEEP: 1
JOINT SWELLING: 1
ORTHOPNEA: 1
MYALGIAS: 1
MUSCLE CRAMPS: 1
POLYPHAGIA: 0
TROUBLE SWALLOWING: 1
POOR WOUND HEALING: 1
SMELL DISTURBANCE: 0
SORE THROAT: 0
WEIGHT GAIN: 1
CHILLS: 1
NECK MASS: 1
SKIN CHANGES: 0
POLYPHAGIA: 0
MEMORY LOSS: 1
BLOATING: 1
WHEEZING: 1
HOARSE VOICE: 1
COUGH: 1
BACK PAIN: 1
ABDOMINAL PAIN: 1
DYSPNEA ON EXERTION: 1
NAUSEA: 1
DOUBLE VISION: 1
INCREASED ENERGY: 0
PARALYSIS: 0
MUSCLE CRAMPS: 1
EYE REDNESS: 1
DECREASED APPETITE: 1
SORE THROAT: 0
VOMITING: 1
TREMORS: 1
WEIGHT LOSS: 0
LOSS OF CONSCIOUSNESS: 1
CONSTIPATION: 1
DISTURBANCES IN COORDINATION: 1
EYE IRRITATION: 1
JOINT SWELLING: 1
SPEECH CHANGE: 0
SHORTNESS OF BREATH: 1
STIFFNESS: 1
BRUISES/BLEEDS EASILY: 1
POLYDIPSIA: 1
JAUNDICE: 0
JAUNDICE: 0
COUGH: 1
WHEEZING: 1
MYALGIAS: 1
SINUS CONGESTION: 1
DOUBLE VISION: 1
TASTE DISTURBANCE: 0
DYSURIA: 1
ORTHOPNEA: 1
SEIZURES: 0
BOWEL INCONTINENCE: 0
SMELL DISTURBANCE: 0
BLOOD IN STOOL: 1
RECTAL PAIN: 0
DIZZINESS: 1

## 2017-11-22 ASSESSMENT — PAIN SCALES - GENERAL: PAINLEVEL: MILD PAIN (2)

## 2017-11-22 NOTE — NURSING NOTE
"No chief complaint on file.      Vitals:    11/22/17 1054   BP: 131/83   BP Location: Left arm   Patient Position: Chair   Cuff Size: Adult Regular   Pulse: 90   Temp: 98.3  F (36.8  C)   SpO2: 95%   Weight: 206 lb 1.6 oz   Height: 5' 7\"       Body mass index is 32.28 kg/(m^2).      Ella Velez                          "

## 2017-11-22 NOTE — LETTER
11/22/2017     RE: Ada Welch  [address] [place] [address]     Dear Colleague,    Thank you for referring your patient, Ada Welch, to the [facility] GASTROENTEROLOGY AND IBD CLINIC at [facility]. Please see a copy of my visit note below.    GI CLINIC VISIT    CC/REFERRING MD:  Jerad Hebert  REASON FOR CONSULTATION:   Jerad Hebert for No chief complaint on file.      ASSESSMENT/PLAN:  Ms. 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  I believe Ms. Welch's major bowel issue is constipation.   Periods of diarrhea are likely in the setting of stool build up over the course of a month.  Ms. Welch's presentation is consistent with pelvic floor dysfunction (given paradoxical squeeze on PE and 5 vaginal deliveries) with possible rectocele (especially given splinting maneuver and EDS diagnosis).     # Idiopathic constipation -- possible outlet obstruction  # Lower abdominal pain  # Bloating, distension   # Borborygmi  # Occasional BRBPR, likely 2/2 internal hemorrhoids seen on cscope   # Tom-Danlos syndroe  --Pelvic floor center referral for diagnostic studies: rectocele rule out, anorectal manometry and biofeedback if needed  --Start Linzess. Start with 145 mcg daily, OK to increase to 290 mcg if needed. Patient previously failed  Miralax, senna, dulcolax, fiber, suppositories.   --Avoid fiber supplementation since this will further bulk up stool and contribute to worsening constipation    RTC: Return in about 3 months (around 2/22/2018).     A total of 60 minutes, face to face, was spent with this patient, >50% of which was counseling regarding the above delineated issues.    Juana Griffith MD   of Medicine  Division of  "Gastroenterology, Hepatology and Nutrition  [facility]      HPI  Ms. 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  She is in the process of work up for mastocytosis (urine showed elevated histamine levels, but patient was on antacid therapy and Mobic at that time). PMHx also includes 5 vaginal deliveries, migraines, cervicalgia, chronic pain syndrome, degenerative joint disease, chronic pan sinusitis.  She is referred to GI clinic for evaluation of possible IBS with mixed stools. Ms. Welch presents with her daughters (Daniel and Magda) today.     Ms. Welch reports lifelong symptoms of abdominal distension, bloating and borborygmi shortly after eating.  Stools alternates between loose stools and constipation. Usually, she has constipation in the form of hard stools and  incomplete evacuation. Performs splinting (applies posterior vaginal wall pressure with fingers) to help evacuate stool. Had \"pelvic floor releases\" with PT in [place], [place] and \"[facility]\" in [place], [place] with some help. Period of constipation will last for 1 month; during this time, will have 1 BM weekly with Miralax. Endorses lower abdominal pain that worsens with constipation.  Previously failed Miralax, senna, dulcolax, fiber, suppositories.  Period of constipation is then followed by several days of loose stools. Ms. Welch endorses occasional BRB with straining in the toilet and TP x 20 years. Cscope 3/2017 showed small mouthed diverticulosis in the sigmoid and 1 hyperplastic polyp; repeat cscope in 10 years was the recommendation. Uses buprenorphine patch (opioid) x 1 year.     In addition to constipation, there are some trigger foods for abdominal pain: eggs, milk, fatty foods.   Gall bladder is still in place. Has had weight gain since summer 2017. Endorses hair loss. TSH was normal 10/5/2017.     Take prilosec during the day and pepcid at night with adequate heartburn " control.     ROS:    No fevers or chills  No weight loss  No blurry vision, double vision or change in vision  No sore throat  No lymphadenopathy  No headache, paraesthesias, or weakness in a limb  No shortness of breath or wheezing  No chest pain or pressure  No rashes or skin changes  No odynophagia or dysphagia  No melena  No dysuria, frequency or urgency  No hot/cold intolerance or polyria  No anxiety or depression    PROBLEM LIST  Patient Active Problem List    Diagnosis Date Noted     Slow transit constipation 08/30/2017     Priority: Medium     Urinary urgency 08/30/2017     Priority: Medium     Nocturia 08/30/2017     Priority: Medium     Cardiac device in situ- [employer] Implanted Loop Recorder (ILR) 08/30/2017     Priority: Medium     Cervicalgia 08/25/2017     Priority: Medium     Headache 07/25/2017     Priority: Medium     Postlaminectomy syndrome, lumbar region 06/14/2017     Priority: Medium     Pain syndrome, chronic 09/05/2016     Priority: Medium     s/p TLIF L4-5,L5-S1 with solid arthrodesis 09/05/2016     Priority: Medium     Tom-Danlos syndrome 09/05/2016     Priority: Medium       PERTINENT 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     Chronic sinusitis 00/2007    Diagnosed with chronic pansinusitis 2016     Chronic tonsillitis 1980    Had tonsils removed 02/1981, rheumatic fever     Tom-Danlos disease      Gastroesophageal reflux disease 3/1/2017    I have large hiatal hernia     Hernia, abdominal 10/2016    Hiatal Hernia     Hiatal hernia 2016    Have adeline hiatel hernia     Hoarseness Unsure    It comes and goes     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     Migraines  1/1/2007    Unsure of exact date     Nasal polyps 2013    Were revoved 03/2017, benign     Pneumonia Unsure    Have had pneumonia several times     Swelling, mass, or lump in head and neck 08/2016    Lymph node has been growing for last year     Thyroid disease 01/01/2008   Was told she had an ulcer at age 15 in the setting of life stress. Not sure if she had an EGD at that time.       PREVIOUS SURGERIES:  Past Surgical History:   Procedure Laterality Date     ADENOIDECTOMY  1981     AS REPAIR OF NASAL SEPTUM       NASAL/SINUS POLYPECTOMY  2017    Polyps were benign     TONSILLECTOMY       TONSILLECTOMY  1981     TUBAL LIGATION         PREVIOUS ENDOSCOPY:  EGD 3/2017: EGD with ringed esophagus with distal bx showing increased eos, min eos in mid esophagus.   cscope 3/2017: small-mouthed, mild diverticulosis, 1 small hyperplastic polyp. Recommended to repeat cscope in 10 years (given path).     ALLERGIES:     Allergies   Allergen Reactions     Bee Venom Anaphylaxis, Difficulty breathing, Palpitations, Shortness Of Breath and Visual Disturbance     Patient does carry Epi-Pen     Chicken-Derived Products (Egg) Diarrhea     Other reaction(s): Nausea  Patient will self monitor  Other reaction(s): GI intolerance     Gabapentin      Gluten Meal      Lactose Dermatitis and Diarrhea     Milk Protein Extract      Other reaction(s): GI intolerance\  EGGS      Topiramate      Wheat Diarrhea     Wheat Bran      Other reaction(s): Nausea  Patient will self monitor       PERTINENT MEDICATIONS:    Current Outpatient Prescriptions:      thyroid (ARMOUR THYROID) 60 MG tablet, Take 60 mg by mouth, Disp: , Rfl:      celecoxib (CELEBREX) 100 MG capsule, Take 1 capsule (100 mg) by mouth 2 times daily, Disp: 60 capsule, Rfl: 1     buprenorphine (BUTRANS) 10 MCG/HR WK patch, Place 1 patch onto the skin every 7 days, Disp: 4 patch, Rfl: 0     nitroFURantoin, macrocrystal-monohydrate, (MACROBID) 100 MG capsule, Take 1 capsule (100 mg) by  mouth 2 times daily, Disp: 10 capsule, Rfl: 0     traMADol (ULTRAM) 50 MG tablet, Take 1 tablet (50 mg) by mouth every 6 hours as needed for pain maximum 4 tablet(s) per day, Disp: 30 tablet, Rfl: 0     zonisamide (ZONEGRAN) 25 MG capsule, Take 1 tablet (25 mg) once daily for 1 week, then 2 tablets once daily for 1 week, then 3 tablets once daily for 1 week, then 4 tablets once daily for 1 week., Disp: 70 capsule, Rfl: 3     eletriptan (RELPAX) 20 MG tablet, Take 1 tablet (20 mg) by mouth at onset of headache for migraine May repeat in 2 hours. Max 4 tablets/24 hours., Disp: 18 tablet, Rfl: 1     levothyroxine (SYNTHROID) 25 MCG tablet, Take 25 mcg by mouth daily, Disp: , Rfl:      amitriptyline (ELAVIL) 10 MG tablet, Take 3 tablets (30 mg) by mouth At Bedtime, Disp: 90 tablet, Rfl: 2     famotidine (PEPCID) 20 MG tablet, Take 1 tablet (20 mg) by mouth At Bedtime, Disp: 90 tablet, Rfl: 1     omeprazole (PRILOSEC) 20 MG CR capsule, Take 1 capsule (20 mg) by mouth daily, Disp: 90 capsule, Rfl: 1     ondansetron (ZOFRAN-ODT) 4 MG ODT tab, Take 1-2 tablets (4-8 mg) by mouth every 12 hours as needed for nausea, Disp: 60 tablet, Rfl: 1     metoclopramide (REGLAN) 5 MG tablet, Take 1 tablet (5 mg) by mouth every 6 hours as needed, Disp: 20 tablet, Rfl: 1     tiZANidine (ZANAFLEX) 4 MG tablet, Take 2 tablets (8 mg) by mouth 3 times daily as needed for muscle spasms, Disp: 90 tablet, Rfl: 3     EPINEPHrine (EPIPEN/ADRENACLICK/OR ANY BX GENERIC EQUIV) 0.3 MG/0.3ML injection 2-pack, Inject 0.3 mg into the muscle, Disp: , Rfl:      albuterol (PROAIR HFA/PROVENTIL HFA/VENTOLIN HFA) 108 (90 BASE) MCG/ACT Inhaler, Inhale 2 puffs into the lungs, Disp: , Rfl:      fluticasone (FLONASE) 50 MCG/ACT spray, 2 sprays, Disp: , Rfl:      etodolac (LODINE) 400 MG tablet, Take 400 mg by mouth, Disp: , Rfl:      COMPRESSION STOCKINGS, 1 each daily 20-30 mmHg Thigh Length measure and fit, color and style per patient preference. Antolin garcia  per need., Disp: 3 each, Rfl: 3     SUMAtriptan (IMITREX STATDOSE) 6 MG/0.5ML pen injector kit, Inject 0.5 mLs (6 mg) Subcutaneous at onset of headache for migraine (may repeat once after 2 hrs) May repeat in 1 hour. Max 12 mg/24 hours., Disp: 2 kit, Rfl: 1     ketamine, KETALAR, 5%-gabapentin, NEURONTIN, 8%-lidocaine 2.5% 5%-8% GEL topical PLO cream, Apply 1 g topically 3 times daily as needed, Disp: 30 g, Rfl: 3     fluticasone-salmeterol (ADVAIR DISKUS) 250-50 MCG/DOSE diskus inhaler, Inhale 1 puff into the lungs 2 times daily as needed , Disp: , Rfl:      cetirizine (ZYRTEC) 10 MG tablet, Take 10 mg by mouth daily , Disp: , Rfl:      polyethylene glycol (MIRALAX/GLYCOLAX) powder, 1 capful Take 1 capful by mouth daily, Disp: , Rfl:      Multiple Vitamins-Minerals (MULTIVITAMIN PO), Take by mouth daily, Disp: , Rfl:      Probiotic Product (PROBIOTIC DAILY PO), Take by mouth 2 times daily 2 packets daily, Disp: , Rfl:     SOCIAL HISTORY:  Social History     Social History     Marital status:      Spouse name: Carry     Number of children: 5     Years of education: N/A     Occupational History     Not on file.     Social History Main Topics     Smoking status: Former Smoker     Packs/day: 0.20     Years: 10.00     Types: Cigarettes     Start date: 1/1/1991     Quit date: 12/1/2013     Smokeless tobacco: Never Used      Comment: I smoked on and off during specified dates.     Alcohol use Yes      Comment: couple per year     Drug use: No     Sexual activity: Yes     Partners: Male     Birth control/ protection: Female Surgical     Other Topics Concern     Not on file     Social History Narrative    5 kids: 24, 23, 19, 13, 5 yo     works at [employer] for disability           FAMILY HISTORY:  Family History   Problem Relation Age of Onset     Connective Tissue Disorder Mother      eds     Connective Tissue Disorder Sister      eds     CANCER Father      Spinal tumor grew into spinal  "cord, went in brain     Migraines Father      DIABETES Maternal Grandmother      Elderly diabetes     HEART DISEASE Maternal Grandmother      Hypertension Maternal Grandmother      DIABETES Paternal Grandmother      Elderly diabetes     DIABETES Paternal Grandfather      Elderly diabetes     Asthma Sister      Pediatric Asthma     Migraines Sister      Asthma Son      Pediatric Asthma     Thyroid Disease Sister      ,     Migraines Sister        Past/family/social history reviewed and no changes    PHYSICAL EXAMINATION:  Constitutional: aaox3, cooperative, pleasant, not dyspneic/diaphoretic, no acute distress  Vitals reviewed: /83 (BP Location: Left arm, Patient Position: Chair, Cuff Size: Adult Regular)  Pulse 90  Temp 98.3  F (36.8  C)  Ht 1.702 m (5' 7\")  Wt 93.5 kg (206 lb 1.6 oz)  SpO2 95%  BMI 32.28 kg/m2  Wt:   Wt Readings from Last 2 Encounters:   11/22/17 93.5 kg (206 lb 1.6 oz)   11/02/17 92.8 kg (204 lb 8 oz)      Eyes: Sclera anicteric/injected  Ears/nose/mouth/throat: Normal oropharynx without ulcers or exudate, mucus membranes moist, hearing intact  Neck: supple, thyroid normal size  CV: RRR. No edema  Respiratory: CTA bl. Unlabored breathing  Lymph: No axillary, submandibular, supraclavicular or inguinal lymphadenopathy  Abd: soft, nondistended, +bs, no hepatosplenomegaly, mild \"pressure like\" discomfort with deep palpation of LLQ. No peritoneal signs  Skin: warm, perfused, no jaundice  Psych: Normal affect  MSK: Normal gait  Rectal: no perianal lesions. Normal sphincter tone. Normal squeeze and relaxation on command. On one of three commands to expel finger, there was a paradoxical squeeze. Perineal descent present.      PERTINENT STUDIES:  Labs 10/2017: normal CBC, TSH, ESR, CRP   GES 10/2017: rapid gastric emptying  Video swallow: mod HH (mixed type), deep penetration without aspiration.     Orders Only on 10/27/2017   Component Date Value Ref Range Status     Specimen Description " 10/27/2017 Midstream Urine   Final     Special Requests 10/27/2017 Specimen received in preservative   Final     Culture Micro 10/27/2017 *  Final                    Value:10,000 to 50,000 colonies/mL  Escherichia coli       Color Urine 10/27/2017 [place]   Final     Appearance Urine 10/27/2017 Slightly Cloudy   Final     Glucose Urine 10/27/2017 Negative  NEG^Negative mg/dL Final     Bilirubin Urine 10/27/2017 Negative  NEG^Negative Final     Ketones Urine 10/27/2017 Negative  NEG^Negative mg/dL Final     Specific Gravity Urine 10/27/2017 1.021  1.003 - 1.035 Final     Blood Urine 10/27/2017 Negative  NEG^Negative Final     pH Urine 10/27/2017 5.0  5.0 - 7.0 pH Final     Protein Albumin Urine 10/27/2017 Negative  NEG^Negative mg/dL Final     Urobilinogen mg/dL 10/27/2017 2.0  0.0 - 2.0 mg/dL Final     Nitrite Urine 10/27/2017 Negative  NEG^Negative Final     Leukocyte Esterase Urine 10/27/2017 Negative  NEG^Negative Final     Source 10/27/2017 Midstream Urine   Final     WBC Urine 10/27/2017 32* 0 - 2 /HPF Final     RBC Urine 10/27/2017 3* 0 - 2 /HPF Final     Squamous Epithelial /HPF Urine 10/27/2017 15* 0 - 1 /HPF Final     Transitional Epi 10/27/2017 1* FEW^Few /HPF Final     Mucous Urine 10/27/2017 Present* NEG^Negative /LPF Final     Again, thank you for allowing me to participate in the care of your patient.      Sincerely,    Juana Griffith MD

## 2017-11-22 NOTE — MR AVS SNAPSHOT
After Visit Summary   11/22/2017    Ada Welch    MRN: [id number]           Patient Information     Date Of Birth          1970        Visit Information        Provider Department      11/22/2017 11:00 AM Juana Griffith MD [facility]        Today's Diagnoses     Tom-Danlos syndrome    -  1    Chronic idiopathic constipation        Flatulence, eructation, and gas pain          Care Instructions    Dear Ms. Welch,     Thank you for coming; it was a pleasure meeting you.     As discussed, I think you may have pelvic floor dysfunction and we need to rule out a rectocele. The plan is:  ---Pelvic floor center referral  ---Start Linzess daily. Start at 145 mcg (1 tab) per day. If ineffective, increase to 290 mcg (2 tabs) per day.    Return for follow up in 3 months.      Thank you,  Dr. Griffith          Follow-ups after your visit        Follow-up notes from your care team     Return in about 3 months (around 2/22/2018).      Your next 10 appointments already scheduled     Nov 30, 2017  9:30 AM CST   (Arrive by 9:15 AM)   New Patient Visit with Bertha Moncada MD   [facility] ([facility])    [address] [place] [id number] [phone]            Dec 06, 2017 11:15 AM CST   (Arrive by 11:00 AM)   Return Visit with Wade Singh MD   [facility] ([facility])    [address] [place] [address]  [address] [facility] [address] [phone]            Dec 09, 2017  9:10 AM CST   (Arrive by 8:55 AM)   Return Visit with Seth Galeano MD   [facility] ([facility])    [address] [place] [id number] [phone]            Dec 15, 2017  7:55 AM CST   (Arrive by 7:40 AM)   Return Visit with Ellyn Rivera MD   [facility] ([facility]  [facility] access hospital [facility] [place])    [address]  [address] [facility] [address]   [phone]            Feb 05, 2018 11:00 AM CST   (Arrive by 10:45 AM)   Implantable Loop Recorder with Uc Cv Device 1   [facility] ([facility])    [address]  [address] [facility] [address] [phone]            Feb 05, 2018 11:30 AM CST   (Arrive by 11:15 AM)   RETURN ARRHYTHMIA with FIDELINA Blanc CNP   [facility] ([facility])    [address]  [address] [facility] [address] [phone]              Who to contact     Please call your clinic at [phone] to:    Ask questions about your health    Make or cancel appointments    Discuss your medicines    Learn about your test results    Speak to your doctor   If you have compliments or concerns about an experience at your clinic, or if you wish to file a complaint, please contact [facility] Patient Relations at [phone] or email us at [email or web address]         Additional Information About Your Visit        [employer] Information     [employer] gives you secure access to your electronic health record. If you see a primary care provider, you can also send messages to your care team and make appointments. If you have questions, please call your primary care clinic.  If you do not have a primary care provider, please call [phone] and they will assist you.      [employer] is an electronic gateway that provides easy, online access to your medical records. With [employer], you can request a clinic appointment, read your test results, renew a prescription or communicate with your care team.     To access your existing account, please contact your [facility] or call [phone] for assistance.        Care EveryWhere ID     This is your Care EveryWhere ID. This could be used by other organizations to  "access your [facility] medical records  [id number]        Your Vitals Were     Pulse Temperature Height Pulse Oximetry BMI (Body Mass Index)       90 98.3  F (36.8  C) 1.702 m (5' 7\") 95% 32.28 kg/m2        Blood Pressure from Last 3 Encounters:   11/22/17 131/83   11/02/17 120/86   10/27/17 108/71    Weight from Last 3 Encounters:   11/22/17 93.5 kg (206 lb 1.6 oz)   11/02/17 92.8 kg (204 lb 8 oz)   10/27/17 90.9 kg (200 lb 6.4 oz)              Today, you had the following     No orders found for display       Primary Care Provider Office Phone # Fax #    Ellyn Rivera [phone]       [address] [place] [address] [address]  [facility] [address]        Equal Access to Services     [facility]: Hadii kia serrano Sonegrito, waaxda luqadaha, qaybta kaalmada eileen, odette dykes . So [facility] [phone].    ATENCIÓN: Si habla español, tiene a sweeney disposición servicios gratuitos de asistencia lingüística. Isaías al [phone].    We comply with applicable federal civil rights laws and [place] laws. We do not discriminate on the basis of race, color, national origin, age, disability, sex, sexual orientation, or gender identity.            Thank you!     Thank you for choosing [facility]  for your care. Our goal is always to provide you with excellent care. Hearing back from our patients is one way we can continue to improve our services. Please take a few minutes to complete the written survey that you may receive in the mail after your visit with us. Thank you!             Your Updated Medication List - Protect others around you: Learn how to safely use, store and throw away your medicines at www.disposemymeds.org.          This list is accurate as of: 11/22/17 12:32 PM.  Always use your most recent med list.                   Brand Name Dispense Instructions for use Diagnosis    ADVAIR DISKUS 250-50 MCG/DOSE diskus inhaler   Generic drug:  " fluticasone-salmeterol      Inhale 1 puff into the lungs 2 times daily as needed        albuterol 108 (90 BASE) MCG/ACT Inhaler    PROAIR HFA/PROVENTIL HFA/VENTOLIN HFA     Inhale 2 puffs into the lungs        amitriptyline 10 MG tablet    ELAVIL    90 tablet    Take 3 tablets (30 mg) by mouth At Bedtime    Chronic migraine without aura without status migrainosus, not intractable       ARMOUR THYROID 60 MG tablet   Generic drug:  thyroid      Take 60 mg by mouth        buprenorphine 10 MCG/HR WK patch    BUTRANS    4 patch    Place 1 patch onto the skin every 7 days    Postlaminectomy syndrome of lumbar region       celecoxib 100 MG capsule    celeBREX    60 capsule    Take 1 capsule (100 mg) by mouth 2 times daily    Lumbar post-laminectomy syndrome       cetirizine 10 MG tablet    zyrTEC     Take 10 mg by mouth daily        COMPRESSION STOCKINGS     3 each    1 each daily 20-30 mmHg Thigh Length measure and fit, color and style per patient preference. Doff n jose per need.    Orthostatic hypotension       eletriptan 20 MG tablet    RELPAX    18 tablet    Take 1 tablet (20 mg) by mouth at onset of headache for migraine May repeat in 2 hours. Max 4 tablets/24 hours.    Chronic daily headache, Intractable migraine without aura and with status migrainosus       EPINEPHrine 0.3 MG/0.3ML injection 2-pack    EPIPEN/ADRENACLICK/or ANY BX GENERIC EQUIV     Inject 0.3 mg into the muscle        etodolac 400 MG tablet    LODINE     Take 400 mg by mouth        famotidine 20 MG tablet    PEPCID    90 tablet    Take 1 tablet (20 mg) by mouth At Bedtime    Hiatal hernia, Mast cell disorder       fluticasone 50 MCG/ACT spray    FLONASE     2 sprays        ketamine (KETALAR) 5%-gabapentin (NEURONTIN) 8%-lidocaine 2.5% 5%-8% Gel topical PLO cream     30 g    Apply 1 g topically 3 times daily as needed    Postlaminectomy syndrome of lumbosacral region, Postlaminectomy syndrome of lumbar region       metoclopramide 5 MG tablet     REGLAN    20 tablet    Take 1 tablet (5 mg) by mouth every 6 hours as needed    Intractable chronic migraine without aura and with status migrainosus       MULTIVITAMIN PO      Take by mouth daily        nitroFURantoin (macrocrystal-monohydrate) 100 MG capsule    MACROBID    10 capsule    Take 1 capsule (100 mg) by mouth 2 times daily    Urinary tract infection       omeprazole 20 MG CR capsule    priLOSEC    90 capsule    Take 1 capsule (20 mg) by mouth daily    Hiatal hernia       ondansetron 4 MG ODT tab    ZOFRAN-ODT    60 tablet    Take 1-2 tablets (4-8 mg) by mouth every 12 hours as needed for nausea    Migraine without status migrainosus, not intractable, unspecified migraine type       polyethylene glycol powder    MIRALAX/GLYCOLAX     1 capful Take 1 capful by mouth daily        PROBIOTIC DAILY PO      Take by mouth 2 times daily 2 packets daily        SUMAtriptan 6 MG/0.5ML pen injector kit    IMITREX STATDOSE    2 kit    Inject 0.5 mLs (6 mg) Subcutaneous at onset of headache for migraine (may repeat once after 2 hrs) May repeat in 1 hour. Max 12 mg/24 hours.    Chronic migraine without aura without status migrainosus, not intractable       SYNTHROID 25 MCG tablet   Generic drug:  levothyroxine      Take 25 mcg by mouth daily        tiZANidine 4 MG tablet    ZANAFLEX    90 tablet    Take 2 tablets (8 mg) by mouth 3 times daily as needed for muscle spasms    Post laminectomy syndrome       traMADol 50 MG tablet    ULTRAM    30 tablet    Take 1 tablet (50 mg) by mouth every 6 hours as needed for pain maximum 4 tablet(s) per day    Lumbar post-laminectomy syndrome       zonisamide 25 MG capsule    Zonegran    70 capsule    Take 1 tablet (25 mg) once daily for 1 week, then 2 tablets once daily for 1 week, then 3 tablets once daily for 1 week, then 4 tablets once daily for 1 week.    Occipital neuritis

## 2017-11-22 NOTE — PATIENT INSTRUCTIONS
Dear Ms. Welch,     Thank you for coming; it was a pleasure meeting you.     As discussed, I think you may have pelvic floor dysfunction and we need to rule out a rectocele. The plan is:  ---Pelvic floor center referral  ---Start Linzess daily. Start at 145 mcg (1 tab) per day. If ineffective, increase to 290 mcg (2 tabs) per day.    Return for follow up in 3 months.      Thank you,  Dr. Griffith

## 2017-11-22 NOTE — PROGRESS NOTES
"GI CLINIC VISIT    CC/REFERRING MD:  Jerad Hebert  REASON FOR CONSULTATION:   Jerad Hebert for No chief complaint on file.      ASSESSMENT/PLAN:  Ms. 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  I believe Ms. Welch's major bowel issue is constipation.   Periods of diarrhea are likely in the setting of stool build up over the course of a month.  Ms. Welch's presentation is consistent with pelvic floor dysfunction (given paradoxical squeeze on PE and 5 vaginal deliveries) with possible rectocele (especially given splinting maneuver and EDS diagnosis).     # Idiopathic constipation -- possible outlet obstruction  # Lower abdominal pain  # Bloating, distension   # Borborygmi  # Occasional BRBPR, likely 2/2 internal hemorrhoids seen on cscope   # Tom-Danlos syndroe  --Pelvic floor center referral for diagnostic studies: rectocele rule out, anorectal manometry and biofeedback if needed  --Start Linzess. Start with 145 mcg daily, OK to increase to 290 mcg if needed. Patient previously failed  Miralax, senna, dulcolax, fiber, suppositories.   --Avoid fiber supplementation since this will further bulk up stool and contribute to worsening constipation    RTC: Return in about 3 months (around 2/22/2018).     A total of 60 minutes, face to face, was spent with this patient, >50% of which was counseling regarding the above delineated issues.    Juana Griffith MD   of Medicine  Division of Gastroenterology, Hepatology and Nutrition  [facility]      HPI  Ms. Welch is a 47 yo woman with Tom Danlos syndrome presenting for evaluation of \"stomach issues\".  She is in the process of work up for mastocytosis (urine showed elevated histamine levels, but patient was on antacid therapy " "and Mobic at that time). PMHx also includes 5 vaginal deliveries, migraines, cervicalgia, chronic pain syndrome, degenerative joint disease, chronic pan sinusitis.  She is referred to GI clinic for evaluation of possible IBS with mixed stools. Ms. Welch presents with her daughters (Daniel and Magda) today.     Ms. Welch reports lifelong symptoms of abdominal distension, bloating and borborygmi shortly after eating.  Stools alternates between loose stools and constipation. Usually, she has constipation in the form of hard stools and  incomplete evacuation. Performs splinting (applies posterior vaginal wall pressure with fingers) to help evacuate stool. Had \"pelvic floor releases\" with PT in [place], [place] and [facility]\" in [place], [place] with some help. Period of constipation will last for 1 month; during this time, will have 1 BM weekly with Miralax. Endorses lower abdominal pain that worsens with constipation.  Previously failed Miralax, senna, dulcolax, fiber, suppositories.  Period of constipation is then followed by several days of loose stools. Ms. Welch endorses occasional BRB with straining in the toilet and TP x 20 years. Cscope 3/2017 showed small mouthed diverticulosis in the sigmoid and 1 hyperplastic polyp; repeat cscope in 10 years was the recommendation. Uses buprenorphine patch (opioid) x 1 year.     In addition to constipation, there are some trigger foods for abdominal pain: eggs, milk, fatty foods.   Gall bladder is still in place. Has had weight gain since summer 2017. Endorses hair loss. TSH was normal 10/5/2017.     Take prilosec during the day and pepcid at night with adequate heartburn control.     ROS:    No fevers or chills  No weight loss  No blurry vision, double vision or change in vision  No sore throat  No lymphadenopathy  No headache, paraesthesias, or weakness in a limb  No shortness of breath or wheezing  No chest pain or pressure  No rashes or skin changes  No odynophagia or " dysphagia  No melena  No dysuria, frequency or urgency  No hot/cold intolerance or polyria  No anxiety or depression    PROBLEM LIST  Patient Active Problem List    Diagnosis Date Noted     Slow transit constipation 08/30/2017     Priority: Medium     Urinary urgency 08/30/2017     Priority: Medium     Nocturia 08/30/2017     Priority: Medium     Cardiac device in situ- [employer] Implanted Loop Recorder (ILR) 08/30/2017     Priority: Medium     Cervicalgia 08/25/2017     Priority: Medium     Headache 07/25/2017     Priority: Medium     Postlaminectomy syndrome, lumbar region 06/14/2017     Priority: Medium     Pain syndrome, chronic 09/05/2016     Priority: Medium     s/p TLIF L4-5,L5-S1 with solid arthrodesis 09/05/2016     Priority: Medium     Tom-Danlos syndrome 09/05/2016     Priority: Medium       PERTINENT 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     Chronic sinusitis 00/2007    Diagnosed with chronic pansinusitis 2016     Chronic tonsillitis 1980    Had tonsils removed 02/1981, rheumatic fever     Tom-Danlos disease      Gastroesophageal reflux disease 3/1/2017    I have large hiatal hernia     Hernia, abdominal 10/2016    Hiatal Hernia     Hiatal hernia 2016    Have adeline hiatel hernia     Hoarseness Unsure    It comes and goes     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     Migraines 1/1/2007    Unsure of exact date     Nasal polyps 2013    Were revoved 03/2017, benign     Pneumonia Unsure    Have had pneumonia several times     Swelling, mass, or lump in head and neck 08/2016    Lymph node has been growing for last year     Thyroid disease 01/01/2008   Was told she had an ulcer at age 15  in the setting of life stress. Not sure if she had an EGD at that time.       PREVIOUS SURGERIES:  Past Surgical History:   Procedure Laterality Date     ADENOIDECTOMY  1981     AS REPAIR OF NASAL SEPTUM       NASAL/SINUS POLYPECTOMY  2017    Polyps were benign     TONSILLECTOMY       TONSILLECTOMY  1981     TUBAL LIGATION         PREVIOUS ENDOSCOPY:  EGD 3/2017: EGD with ringed esophagus with distal bx showing increased eos, min eos in mid esophagus.   cscope 3/2017: small-mouthed, mild diverticulosis, 1 small hyperplastic polyp. Recommended to repeat cscope in 10 years (given path).     ALLERGIES:     Allergies   Allergen Reactions     Bee Venom Anaphylaxis, Difficulty breathing, Palpitations, Shortness Of Breath and Visual Disturbance     Patient does carry Epi-Pen     Chicken-Derived Products (Egg) Diarrhea     Other reaction(s): Nausea  Patient will self monitor  Other reaction(s): GI intolerance     Gabapentin      Gluten Meal      Lactose Dermatitis and Diarrhea     Milk Protein Extract      Other reaction(s): GI intolerance\  EGGS      Topiramate      Wheat Diarrhea     Wheat Bran      Other reaction(s): Nausea  Patient will self monitor       PERTINENT MEDICATIONS:    Current Outpatient Prescriptions:      thyroid (ARMOUR THYROID) 60 MG tablet, Take 60 mg by mouth, Disp: , Rfl:      celecoxib (CELEBREX) 100 MG capsule, Take 1 capsule (100 mg) by mouth 2 times daily, Disp: 60 capsule, Rfl: 1     buprenorphine (BUTRANS) 10 MCG/HR WK patch, Place 1 patch onto the skin every 7 days, Disp: 4 patch, Rfl: 0     nitroFURantoin, macrocrystal-monohydrate, (MACROBID) 100 MG capsule, Take 1 capsule (100 mg) by mouth 2 times daily, Disp: 10 capsule, Rfl: 0     traMADol (ULTRAM) 50 MG tablet, Take 1 tablet (50 mg) by mouth every 6 hours as needed for pain maximum 4 tablet(s) per day, Disp: 30 tablet, Rfl: 0     zonisamide (ZONEGRAN) 25 MG capsule, Take 1 tablet (25 mg) once daily for 1 week, then 2 tablets once daily for  1 week, then 3 tablets once daily for 1 week, then 4 tablets once daily for 1 week., Disp: 70 capsule, Rfl: 3     eletriptan (RELPAX) 20 MG tablet, Take 1 tablet (20 mg) by mouth at onset of headache for migraine May repeat in 2 hours. Max 4 tablets/24 hours., Disp: 18 tablet, Rfl: 1     levothyroxine (SYNTHROID) 25 MCG tablet, Take 25 mcg by mouth daily, Disp: , Rfl:      amitriptyline (ELAVIL) 10 MG tablet, Take 3 tablets (30 mg) by mouth At Bedtime, Disp: 90 tablet, Rfl: 2     famotidine (PEPCID) 20 MG tablet, Take 1 tablet (20 mg) by mouth At Bedtime, Disp: 90 tablet, Rfl: 1     omeprazole (PRILOSEC) 20 MG CR capsule, Take 1 capsule (20 mg) by mouth daily, Disp: 90 capsule, Rfl: 1     ondansetron (ZOFRAN-ODT) 4 MG ODT tab, Take 1-2 tablets (4-8 mg) by mouth every 12 hours as needed for nausea, Disp: 60 tablet, Rfl: 1     metoclopramide (REGLAN) 5 MG tablet, Take 1 tablet (5 mg) by mouth every 6 hours as needed, Disp: 20 tablet, Rfl: 1     tiZANidine (ZANAFLEX) 4 MG tablet, Take 2 tablets (8 mg) by mouth 3 times daily as needed for muscle spasms, Disp: 90 tablet, Rfl: 3     EPINEPHrine (EPIPEN/ADRENACLICK/OR ANY BX GENERIC EQUIV) 0.3 MG/0.3ML injection 2-pack, Inject 0.3 mg into the muscle, Disp: , Rfl:      albuterol (PROAIR HFA/PROVENTIL HFA/VENTOLIN HFA) 108 (90 BASE) MCG/ACT Inhaler, Inhale 2 puffs into the lungs, Disp: , Rfl:      fluticasone (FLONASE) 50 MCG/ACT spray, 2 sprays, Disp: , Rfl:      etodolac (LODINE) 400 MG tablet, Take 400 mg by mouth, Disp: , Rfl:      COMPRESSION STOCKINGS, 1 each daily 20-30 mmHg Thigh Length measure and fit, color and style per patient preference. Antolin garcia per need., Disp: 3 each, Rfl: 3     SUMAtriptan (IMITREX STATDOSE) 6 MG/0.5ML pen injector kit, Inject 0.5 mLs (6 mg) Subcutaneous at onset of headache for migraine (may repeat once after 2 hrs) May repeat in 1 hour. Max 12 mg/24 hours., Disp: 2 kit, Rfl: 1     ketamine, KETALAR, 5%-gabapentin, NEURONTIN,  8%-lidocaine 2.5% 5%-8% GEL topical PLO cream, Apply 1 g topically 3 times daily as needed, Disp: 30 g, Rfl: 3     fluticasone-salmeterol (ADVAIR DISKUS) 250-50 MCG/DOSE diskus inhaler, Inhale 1 puff into the lungs 2 times daily as needed , Disp: , Rfl:      cetirizine (ZYRTEC) 10 MG tablet, Take 10 mg by mouth daily , Disp: , Rfl:      polyethylene glycol (MIRALAX/GLYCOLAX) powder, 1 capful Take 1 capful by mouth daily, Disp: , Rfl:      Multiple Vitamins-Minerals (MULTIVITAMIN PO), Take by mouth daily, Disp: , Rfl:      Probiotic Product (PROBIOTIC DAILY PO), Take by mouth 2 times daily 2 packets daily, Disp: , Rfl:     SOCIAL HISTORY:  Social History     Social History     Marital status:      Spouse name: Carry     Number of children: 5     Years of education: N/A     Occupational History     Not on file.     Social History Main Topics     Smoking status: Former Smoker     Packs/day: 0.20     Years: 10.00     Types: Cigarettes     Start date: 1/1/1991     Quit date: 12/1/2013     Smokeless tobacco: Never Used      Comment: I smoked on and off during specified dates.     Alcohol use Yes      Comment: couple per year     Drug use: No     Sexual activity: Yes     Partners: Male     Birth control/ protection: Female Surgical     Other Topics Concern     Not on file     Social History Narrative    5 kids: 24, 23, 19, 13, 5 yo     works at [employer] for disability           FAMILY HISTORY:  Family History   Problem Relation Age of Onset     Connective Tissue Disorder Mother      eds     Connective Tissue Disorder Sister      eds     CANCER Father      Spinal tumor grew into spinal cord, went in brain     Migraines Father      DIABETES Maternal Grandmother      Elderly diabetes     HEART DISEASE Maternal Grandmother      Hypertension Maternal Grandmother      DIABETES Paternal Grandmother      Elderly diabetes     DIABETES Paternal Grandfather      Elderly diabetes     Asthma Sister      " Pediatric Asthma     Migraines Sister      Asthma Son      Pediatric Asthma     Thyroid Disease Sister      ,     Migraines Sister        Past/family/social history reviewed and no changes    PHYSICAL EXAMINATION:  Constitutional: aaox3, cooperative, pleasant, not dyspneic/diaphoretic, no acute distress  Vitals reviewed: /83 (BP Location: Left arm, Patient Position: Chair, Cuff Size: Adult Regular)  Pulse 90  Temp 98.3  F (36.8  C)  Ht 1.702 m (5' 7\")  Wt 93.5 kg (206 lb 1.6 oz)  SpO2 95%  BMI 32.28 kg/m2  Wt:   Wt Readings from Last 2 Encounters:   11/22/17 93.5 kg (206 lb 1.6 oz)   11/02/17 92.8 kg (204 lb 8 oz)      Eyes: Sclera anicteric/injected  Ears/nose/mouth/throat: Normal oropharynx without ulcers or exudate, mucus membranes moist, hearing intact  Neck: supple, thyroid normal size  CV: RRR. No edema  Respiratory: CTA bl. Unlabored breathing  Lymph: No axillary, submandibular, supraclavicular or inguinal lymphadenopathy  Abd: soft, nondistended, +bs, no hepatosplenomegaly, mild \"pressure like\" discomfort with deep palpation of LLQ. No peritoneal signs  Skin: warm, perfused, no jaundice  Psych: Normal affect  MSK: Normal gait  Rectal: no perianal lesions. Normal sphincter tone. Normal squeeze and relaxation on command. On one of three commands to expel finger, there was a paradoxical squeeze. Perineal descent present.      PERTINENT STUDIES:  Labs 10/2017: normal CBC, TSH, ESR, CRP   GES 10/2017: rapid gastric emptying  Video swallow: mod HH (mixed type), deep penetration without aspiration.     Orders Only on 10/27/2017   Component Date Value Ref Range Status     Specimen Description 10/27/2017 Midstream Urine   Final     Special Requests 10/27/2017 Specimen received in preservative   Final     Culture Micro 10/27/2017 *  Final                    Value:10,000 to 50,000 colonies/mL  Escherichia coli       Color Urine 10/27/2017 [place]   Final     Appearance Urine 10/27/2017 Slightly Cloudy   " Final     Glucose Urine 10/27/2017 Negative  NEG^Negative mg/dL Final     Bilirubin Urine 10/27/2017 Negative  NEG^Negative Final     Ketones Urine 10/27/2017 Negative  NEG^Negative mg/dL Final     Specific Gravity Urine 10/27/2017 1.021  1.003 - 1.035 Final     Blood Urine 10/27/2017 Negative  NEG^Negative Final     pH Urine 10/27/2017 5.0  5.0 - 7.0 pH Final     Protein Albumin Urine 10/27/2017 Negative  NEG^Negative mg/dL Final     Urobilinogen mg/dL 10/27/2017 2.0  0.0 - 2.0 mg/dL Final     Nitrite Urine 10/27/2017 Negative  NEG^Negative Final     Leukocyte Esterase Urine 10/27/2017 Negative  NEG^Negative Final     Source 10/27/2017 Midstream Urine   Final     WBC Urine 10/27/2017 32* 0 - 2 /HPF Final     RBC Urine 10/27/2017 3* 0 - 2 /HPF Final     Squamous Epithelial /HPF Urine 10/27/2017 15* 0 - 1 /HPF Final     Transitional Epi 10/27/2017 1* FEW^Few /HPF Final     Mucous Urine 10/27/2017 Present* NEG^Negative /LPF Final

## 2017-11-27 ENCOUNTER — PRE VISIT (OUTPATIENT)
Dept: UROLOGY | Facility: CLINIC | Age: 47
End: 2017-11-27

## 2017-11-27 ENCOUNTER — TELEPHONE (OUTPATIENT)
Dept: GASTROENTEROLOGY | Facility: CLINIC | Age: 47
End: 2017-11-27

## 2017-11-27 NOTE — TELEPHONE ENCOUNTER
[facility] Prior Authorization Team   Phone: [phone]  Fax: [phone]      [place] Initiation    Medication: linaclotide (LINZESS) 145 MCG capsule  Insurance Company: [employer] [place] - Phone [phone] Fax [phone]  Pharmacy Filling the Rx: [id number] [place] [facility] [place] - [address] [place] [address] TRAIL  Filling Pharmacy Phone: [phone]  Filling Pharmacy Fax: [phone]  Start Date: 11/27/2017

## 2017-11-27 NOTE — TELEPHONE ENCOUNTER
Patient with history of night time urgency with incontinence coming in for symptom recheck. Patient chart reviewed, no need for call, all records available and ready for appointment.

## 2017-11-27 NOTE — TELEPHONE ENCOUNTER
Received a specialized P/A form from [employer]. Filled out, attached supporting info. Faxed back to [phone].

## 2017-11-27 NOTE — TELEPHONE ENCOUNTER
Prior Authorization Specialty Medication Request    Medication/Dose: Linzess. Start with 145 mcg daily, OK to increase to 290 mcg if needed  Diagnosis and ICD: Idiopathic constipation  New/Renewal/Insurance Change [place]: New    Important Lab Values:     Previously Tried and Failed Therapies: Patient previously failed  Miralax, senna, dulcolax, fiber, suppositories.   Rationale:Idiopathic constipation -- possible outlet obstruction  # Lower abdominal pain  # Bloating, distension   # Borborygmi  # Occasional BRBPR, likely 2/2 internal hemorrhoids seen on cscope   # Tom-Danlos syndroe  --Pelvic floor center referral for diagnostic studies: rectocele rule out, anorectal manometry and biofeedback if needed  --Start Linzess. Start with 145 mcg daily, OK to increase to 290 mcg if needed. Patient previously failed  Miralax, senna, dulcolax, fiber, suppositories.   --Avoid fiber supplementation since this will further bulk up stool and contribute to worsening constipation    Would you like to include any research articles?    If yes please include the hyperlink(s) below or fax @ [phone].    (Include Name and MRN)    If you received a fax notification from an outside Pharmacy;  Pharmacy Name:[facility]  Pharmacy #:[phone]  Pharmacy Fax:

## 2017-11-28 NOTE — TELEPHONE ENCOUNTER
Prior Authorization Approval    Authorization Effective Date: 11/22/2017  Authorization Expiration Date: 11/22/2018  Medication: linaclotide (LINZESS) 145 MCG capsule  Approved Dose/Quantity: 60  Reference #: [id number]   [facility] - Phone [phone] Fax [phone]  Expected CoPay: No Copay     CoPay Card Available:      Foundation Assistance Needed:    Which Pharmacy is filling the prescription (Not needed for infusion/clinic administered): [id number] [address]  Pharmacy Notified: Yes  Patient Notified: Yes

## 2017-11-30 ENCOUNTER — OFFICE VISIT (OUTPATIENT)
Dept: NEUROSURGERY | Facility: CLINIC | Age: 47
End: 2017-11-30

## 2017-11-30 VITALS
OXYGEN SATURATION: 96 % | SYSTOLIC BLOOD PRESSURE: 135 MMHG | BODY MASS INDEX: 31.97 KG/M2 | WEIGHT: 203.7 LBS | HEIGHT: 67 IN | TEMPERATURE: 96.7 F | DIASTOLIC BLOOD PRESSURE: 89 MMHG | HEART RATE: 130 BPM

## 2017-11-30 DIAGNOSIS — M48.061 LUMBAR STENOSIS: ICD-10-CM

## 2017-11-30 DIAGNOSIS — M48.02 SPINAL STENOSIS IN CERVICAL REGION: Primary | ICD-10-CM

## 2017-11-30 ASSESSMENT — PAIN SCALES - GENERAL: PAINLEVEL: EXTREME PAIN (9)

## 2017-11-30 NOTE — MR AVS SNAPSHOT
After Visit Summary   11/30/2017    Ada Welch    MRN: [id number]           Patient Information     Date Of Birth          1970        Visit Information        Provider Department      11/30/2017 9:30 AM Bertha Moncada MD [facility] Neurosurgery        Today's Diagnoses     Spinal stenosis in cervical region    -  1    Lumbar stenosis           Follow-ups after your visit        Your next 10 appointments already scheduled     Nov 30, 2017 11:00 AM CST   (Arrive by 10:45 AM)   XR LUMBAR SPINE G/E 4 VIEWS with UCXR1   [facility] ([facility])    [address] [place] [address]  [address] [facility] [id number] [phone]           Please bring a list of your current medicines to your exam. (Include vitamins, minerals and over-thecounter medicines.) Leave your valuables at home.  Tell your doctor if there is a chance you may be pregnant.  You do not need to do anything special for this exam.            Nov 30, 2017 11:30 AM CST   XR CERVICAL SPINE G/E 4 VIEWS with UCXR1   [facility] ([facility])    [address] [place] [id number] [phone]           Please bring a list of your current medicines to your exam. (Include vitamins, minerals and over-thecounter medicines.) Leave your valuables at home.  Tell your doctor if there is a chance you may be pregnant.  You do not need to do anything special for this exam.            Dec 06, 2017 11:15 AM CST   (Arrive by 11:00 AM)   Return Visit with Wade Singh MD   [facility] ([facility])    [address] [place] [address]  [address] [facility] [id number] [phone]            Dec 09, 2017  9:10 AM CST   (Arrive by 8:55 AM)   Return Visit with Seth Galeano MD   [facility] ([facility])    [address] [place] [address]  [facility] [address]   [phone]            Dec 15, 2017  7:55 AM CST   (Arrive by 7:40 AM)   Return Visit with Ellyn Rivera MD   [facility] ([facility])    [address] [place] [address]  4th [facility] [address]   [phone]            Feb 05, 2018 11:00 AM CST   (Arrive by 10:45 AM)   Implantable Loop Recorder with Uc Cv Device 1   [facility] ([facility])    [address] [place] [address]  3rd [facility] [address]   [phone]            Feb 05, 2018 11:30 AM CST   (Arrive by 11:15 AM)   RETURN ARRHYTHMIA with FIDELINA Blanc [facility])    [address] [place] [address]  3rd [facility] [address]   [phone]              Future tests that were ordered for you today     Open Future Orders        Priority Expected Expires Ordered    X-ray Cervical spine 4 views (AP, lateral, flexion, extension)  [[id number]] Routine 11/30/2017 11/30/2018 11/30/2017    X-ray Lumbar spine G/E 4 views (AP, lateral, flexion, extension - standing views preferred) [[id number]] Routine 11/30/2017 11/30/2018 11/30/2017            Who to contact     Please call your clinic at [phone] to:    Ask questions about your health    Make or cancel appointments    Discuss your medicines    Learn about your test results    Speak to your doctor   If you have compliments or concerns about an experience at your clinic, or if you wish to file a complaint, please contact [facility] Patient Relations at [phone] or email us at [email or web address]         Additional Information About Your Visit        MyChart Information     MyChart gives you secure access to your electronic health record. If you see a primary care provider, you can also send messages to your care team and make appointments. If you have questions, please call your primary  "care clinic.  If you do not have a primary care provider, please call [phone] and they will assist you.      [employer] is an electronic gateway that provides easy, online access to your medical records. With [employer], you can request a clinic appointment, read your test results, renew a prescription or communicate with your care team.     To access your existing account, please contact your [facility] or call [phone] for assistance.        Care EveryWhere ID     This is your [facility] EveryWhere ID. This could be used by other organizations to access your [place] medical records  [id number]        Your Vitals Were     Pulse Temperature Height Pulse Oximetry BMI (Body Mass Index)       130 96.7  F (35.9  C) (Oral) 1.702 m (5' 7\") 96% 31.9 kg/m2        Blood Pressure from Last 3 Encounters:   11/30/17 135/89   11/22/17 131/83   11/02/17 120/86    Weight from Last 3 Encounters:   11/30/17 92.4 kg (203 lb 11.2 oz)   11/22/17 93.5 kg (206 lb 1.6 oz)   11/02/17 92.8 kg (204 lb 8 oz)               Primary Care Provider Office Phone # Fax #    Ellyn Rivera [phone]       [address] [place] [address]        Equal Access to Services     CHRISTINA GUY AH: Hadii kia gamboa hadasho Sonegrito, waaxda luqadaha, qaybta kaalmada eileen, odette tang. So [facility] [phone].    ATENCIÓN: Si habla español, tiene a sweeney disposición servicios gratuitos de asistencia lingüística. Llame al [phone].    We comply with applicable federal civil rights laws and [place] laws. We do not discriminate on the basis of race, color, national origin, age, disability, sex, sexual orientation, or gender identity.            Thank you!     Thank you for choosing [facility]  for your care. Our goal is always to provide you with excellent care. Hearing back from our patients is one way we can continue to improve our services. Please take a " few minutes to complete the written survey that you may receive in the mail after your visit with us. Thank you!             Your Updated Medication List - Protect others around you: Learn how to safely use, store and throw away your medicines at www.disposemymeds.org.          This list is accurate as of: 11/30/17 10:43 AM.  Always use your most recent med list.                   Brand Name Dispense Instructions for use Diagnosis    ADVAIR DISKUS 250-50 MCG/DOSE diskus inhaler   Generic drug:  fluticasone-salmeterol      Inhale 1 puff into the lungs 2 times daily as needed        albuterol 108 (90 BASE) MCG/ACT Inhaler    PROAIR HFA/PROVENTIL HFA/VENTOLIN HFA     Inhale 2 puffs into the lungs        amitriptyline 10 MG tablet    ELAVIL    90 tablet    Take 3 tablets (30 mg) by mouth At Bedtime    Chronic migraine without aura without status migrainosus, not intractable       ARMOUR THYROID 60 MG tablet   Generic drug:  thyroid      Take 60 mg by mouth        buprenorphine 10 MCG/HR WK patch    BUTRANS    4 patch    Place 1 patch onto the skin every 7 days    Postlaminectomy syndrome of lumbar region       celecoxib 100 MG capsule    celeBREX    60 capsule    Take 1 capsule (100 mg) by mouth 2 times daily    Lumbar post-laminectomy syndrome       cetirizine 10 MG tablet    zyrTEC     Take 10 mg by mouth daily        COMPRESSION STOCKINGS     3 each    1 each daily 20-30 mmHg Thigh Length measure and fit, color and style per patient preference. Doff n jose per need.    Orthostatic hypotension       eletriptan 20 MG tablet    RELPAX    18 tablet    Take 1 tablet (20 mg) by mouth at onset of headache for migraine May repeat in 2 hours. Max 4 tablets/24 hours.    Chronic daily headache, Intractable migraine without aura and with status migrainosus       EPINEPHrine 0.3 MG/0.3ML injection 2-pack    EPIPEN/ADRENACLICK/or ANY BX GENERIC EQUIV     Inject 0.3 mg into the muscle        etodolac 400 MG tablet    LODINE      Take 400 mg by mouth        famotidine 20 MG tablet    PEPCID    90 tablet    Take 1 tablet (20 mg) by mouth At Bedtime    Hiatal hernia, Mast cell disorder       fluticasone 50 MCG/ACT spray    FLONASE     2 sprays        ketamine (KETALAR) 5%-gabapentin (NEURONTIN) 8%-lidocaine 2.5% 5%-8% Gel topical PLO cream     30 g    Apply 1 g topically 3 times daily as needed    Postlaminectomy syndrome of lumbosacral region, Postlaminectomy syndrome of lumbar region       linaclotide 145 MCG capsule    LINZESS    30 capsule    Take 1 capsule (145 mcg) by mouth every morning (before breakfast)    Chronic idiopathic constipation       metoclopramide 5 MG tablet    REGLAN    20 tablet    Take 1 tablet (5 mg) by mouth every 6 hours as needed    Intractable chronic migraine without aura and with status migrainosus       MULTIVITAMIN PO      Take by mouth daily        nitroFURantoin (macrocrystal-monohydrate) 100 MG capsule    MACROBID    10 capsule    Take 1 capsule (100 mg) by mouth 2 times daily    Urinary tract infection       omeprazole 20 MG CR capsule    priLOSEC    90 capsule    Take 1 capsule (20 mg) by mouth daily    Hiatal hernia       ondansetron 4 MG ODT tab    ZOFRAN-ODT    60 tablet    Take 1-2 tablets (4-8 mg) by mouth every 12 hours as needed for nausea    Migraine without status migrainosus, not intractable, unspecified migraine type       polyethylene glycol powder    MIRALAX/GLYCOLAX     1 capful Take 1 capful by mouth daily        PROBIOTIC DAILY PO      Take by mouth 2 times daily 2 packets daily        SUMAtriptan 6 MG/0.5ML pen injector kit    IMITREX STATDOSE    2 kit    Inject 0.5 mLs (6 mg) Subcutaneous at onset of headache for migraine (may repeat once after 2 hrs) May repeat in 1 hour. Max 12 mg/24 hours.    Chronic migraine without aura without status migrainosus, not intractable       SYNTHROID 25 MCG tablet   Generic drug:  levothyroxine      Take 25 mcg by mouth daily        tiZANidine 4 MG tablet     ZANAFLEX    90 tablet    Take 2 tablets (8 mg) by mouth 3 times daily as needed for muscle spasms    Post laminectomy syndrome       traMADol 50 MG tablet    ULTRAM    30 tablet    Take 1 tablet (50 mg) by mouth every 6 hours as needed for pain maximum 4 tablet(s) per day    Lumbar post-laminectomy syndrome       zonisamide 25 MG capsule    Zonegran    70 capsule    Take 1 tablet (25 mg) once daily for 1 week, then 2 tablets once daily for 1 week, then 3 tablets once daily for 1 week, then 4 tablets once daily for 1 week.    Occipital neuritis

## 2017-11-30 NOTE — LETTER
11/30/2017       RE: Ada Welch  [address] [place] [address]     Dear Colleague,    Thank you for referring your patient, Ada Welch, to the [facility] NEUROSURGERY at [facility]. Please see a copy of my visit note below.    HISTORY OF PRESENT ILLNESS:  Ms. Welch is a 46-year-old female presenting to Neurosurgery Clinic today to establish care with a neurosurgeon for several ongoing spinal related problems.  Ms. Welch's past medical history is fairly complicated and consists of a type 3 Tom-Danlos, cervicalgia, cervical instability, mast cell disorder, reported Chiari malformation, TMJ, lumbar stenosis status post L4-L5 and L5-S1 TLIF.  Ms. Welch's primary complaint to be addressed today is approximately 1 year of tingling which extends down her bilateral arms in a C8 distribution, as well as associated bilateral hand  weakness.  She feels that this has been getting worse over the interval time.  She states that movement of her neck exacerbates this pain.  Specifically, extension of the neck results in pain that will radiate up towards the base of her skull.  With her neck in flexion, she can feel pain that radiates into her shoulders and collar bones bilaterally.      Due to the patient's history of Tom-Danlos, she has been working within the ED community to find someone who may specialize in her condition.  She was referred to a Dr. Puri in [facility] and saw him in August of this year.  Dr. Puri was concerned with the hypermobility of her cervical spine and placed her in a cervical collar.  She has been wearing her cervical collar p.r.n.  She currently is working with Dr. Galeano in Neurology, as well as several other physicians here within the [place] system, so the patient decided to establish care with a neurosurgeon within the [place] system as well.      The patient underwent an EMG which was not diagnostic for  radiculopathy.  It was notable for abnormal spontaneous activity restricted to the left cervical paraspinal muscles.       PHYSICAL EXAMINATION:  The patient is alert and appropriately communicative.  She is soft spoken and tearful.  She has decreased sensation along the bilateral C8 distribution.  Finger abduction and  strength 4/5 in bilateral upper extremities.  Otherwise, her strength is 5/5 in bilateral upper and lower extremities.  DTRs were normal and symmetric throughout.  Positive Santiago's sign bilaterally.      IMAGING:  Cervical MRI dated 08/18/2017 was reviewed today.  This study demonstrates fairly patent cervical spine without any areas of spinal canal stenosis.  No obvious neural foraminal stenosis.  This study includes flexion and extension views within the MRI studies.  It does appear that the patient is hypermobile and there may be a degree of anterolisthesis; however, it is difficult to tell due to extensive motion artifact.  There are degenerative changes throughout the cervical spine that are most pronounced at the C4-C5, C5-C6 and C6-C7 levels with disk herniations at those associated levels.  There is abutment with the spinal cord; however, there is plenty of residual area within the spinal canal.  No encephalomalacia is noted within the cord.  Cervical CT dated 08/17/2017 was also reviewed.  Again, these studies included flexion and extension sequences.  Again, there is hypermobility of the cervical spine with apparent grade I anterolisthesis at the C5-C6 level.  ZULEYKA appear stable in flexion and extension.      ASSESSMENT:   1.  Generalized cervicalgia.   2.  Bilateral C8 radiculopathy without radiographic evidence  3.  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      PLAN:   1.  Flexion and extension x-rays of cervical and lumbar spine.   2.  The patient was told she could continue to use her collar p.r.n.; however, that she should attempt to wean herself out of the collar as best as  possible.  It is likely that there is resultant atrophy which is contributing to the patient's generalized cervicalgia.   3.  The patient is referred back to her pain clinic where an occipital nerve block can be considered.  At this time, we are recommending against a Botox injection due to her likely atrophied muscles, as well as possible cervical instability.   4.  No surgery was offered at this time.  Though the patient does seem to complain of bilateral C8 radiculopathies, there is no clear radiographic evidence of nerve impingement to be the cause of her symptoms.  She does have hypermobility on her scans that would be consistent with Tom-Danlos.  This was also corroborated with an EMG that she had performed that was nonspecific for any particular radiculopathy.          Dictated by Jony Pearson MD, Resident          DESTIN SANDOVAL MD       As dictated by JOYN PEARSON MD            D: 2017 17:13   T: 2017 07:03   MT: ELSA      Name:     PANCHO HENAO   MRN:      [id number]        Account:      [id number]   :      1970           Service Date: 2017      Document: [id number]        Again, thank you for allowing me to participate in the care of your patient.      Sincerely,    Destin Sandoval MD    cc: MD Faiza Ruiz MD

## 2017-12-04 NOTE — PROGRESS NOTES
HISTORY OF PRESENT ILLNESS:  Ms. Welch is a 46-year-old female presenting to Neurosurgery Clinic today to establish care with a neurosurgeon for several ongoing spinal related problems.  Ms. Welch's past medical history is fairly complicated and consists of a type 3 Tom-Danlos, cervicalgia, cervical instability, mast cell disorder, reported Chiari malformation, TMJ, lumbar stenosis status post L4-L5 and L5-S1 TLIF.  Ms. Welch's primary complaint to be addressed today is approximately 1 year of tingling which extends down her bilateral arms in a C8 distribution, as well as associated bilateral hand  weakness.  She feels that this has been getting worse over the interval time.  She states that movement of her neck exacerbates this pain.  Specifically, extension of the neck results in pain that will radiate up towards the base of her skull.  With her neck in flexion, she can feel pain that radiates into her shoulders and collar bones bilaterally.      Due to the patient's history of Tom-Danlos, she has been working within the ED community to find someone who may specialize in her condition.  She was referred to a Dr. Puri in [facility] and saw him in August of this year.  Dr. Puri was concerned with the hypermobility of her cervical spine and placed her in a cervical collar.  She has been wearing her cervical collar p.r.n.  She currently is working with Dr. Galeano in Neurology, as well as several other physicians here within the [place] system, so the patient decided to establish care with a neurosurgeon within the [place] system as well.      The patient underwent an EMG which was not diagnostic for radiculopathy.  It was notable for abnormal spontaneous activity restricted to the left cervical paraspinal muscles.       PHYSICAL EXAMINATION:  The patient is alert and appropriately communicative.  She is soft spoken and tearful.  She has decreased sensation along the bilateral C8  distribution.  Finger abduction and  strength 4/5 in bilateral upper extremities.  Otherwise, her strength is 5/5 in bilateral upper and lower extremities.  DTRs were normal and symmetric throughout.  Positive Santiago's sign bilaterally.      IMAGING:  Cervical MRI dated 08/18/2017 was reviewed today.  This study demonstrates fairly patent cervical spine without any areas of spinal canal stenosis.  No obvious neural foraminal stenosis.  This study includes flexion and extension views within the MRI studies.  It does appear that the patient is hypermobile and there may be a degree of anterolisthesis; however, it is difficult to tell due to extensive motion artifact.  There are degenerative changes throughout the cervical spine that are most pronounced at the C4-C5, C5-C6 and C6-C7 levels with disk herniations at those associated levels.  There is abutment with the spinal cord; however, there is plenty of residual area within the spinal canal.  No encephalomalacia is noted within the cord.  Cervical CT dated 08/17/2017 was also reviewed.  Again, these studies included flexion and extension sequences.  Again, there is hypermobility of the cervical spine with apparent grade I anterolisthesis at the C5-C6 level.  ZULEYKA appear stable in flexion and extension.      ASSESSMENT:   1.  Generalized cervicalgia.   2.  Bilateral C8 radiculopathy without radiographic evidence  3.  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      PLAN:   1.  Flexion and extension x-rays of cervical and lumbar spine.   2.  The patient was told she could continue to use her collar p.r.n.; however, that she should attempt to wean herself out of the collar as best as possible.  It may be that there is resultant atrophy which is contributing to the patient's generalized cervicalgia.   3.  The patient is referred back to her pain clinic where an occipital nerve block can be considered.  At this time, we are recommending against a Botox injection due to  her likely atrophied muscles, as well as possible cervical instability.   4.  No surgery was offered at this time.  Though the patient does seem to complain of bilateral C8 radiculopathies, there is no clear radiographic evidence of nerve impingement to be the cause of her symptoms.  She does have hypermobility on her scans that would be consistent with Tom-Danlos.  This was also corroborated with an EMG that she had performed that was nonspecific for any particular radiculopathy. Surgery may be an option in the future, but we are not convinced that this will significantly ameliorate any of her various complaints.       Dictated by Jony Pearson MD, Resident       cc: MD Faiza Ruiz MD       Addendum:  I have seen this patient and agree with this note. CORETTA SANDOVAL MD       As dictated by JONY PEARSON MD            D: 2017 17:13   T: 2017 07:03   MT: ELSA      Name:     PANCHO HENAO   MRN:      [id number]        Account:      [id number]   :      1970           Service Date: 2017      Document: [id number]

## 2017-12-09 ENCOUNTER — OFFICE VISIT (OUTPATIENT)
Dept: NEUROLOGY | Facility: CLINIC | Age: 47
End: 2017-12-09

## 2017-12-09 VITALS
SYSTOLIC BLOOD PRESSURE: 127 MMHG | BODY MASS INDEX: 30.98 KG/M2 | HEIGHT: 67 IN | TEMPERATURE: 98.1 F | HEART RATE: 97 BPM | RESPIRATION RATE: 20 BRPM | WEIGHT: 197.4 LBS | OXYGEN SATURATION: 97 % | DIASTOLIC BLOOD PRESSURE: 79 MMHG

## 2017-12-09 DIAGNOSIS — M54.81 OCCIPITAL NEURITIS: ICD-10-CM

## 2017-12-09 PROBLEM — E66.9 OBESE: Status: ACTIVE | Noted: 2017-12-09

## 2017-12-09 PROBLEM — R59.0 LYMPHADENOPATHY OF LEFT CERVICAL REGION: Status: ACTIVE | Noted: 2017-03-27

## 2017-12-09 PROBLEM — Z76.5 DRUG-SEEKING BEHAVIOR: Status: ACTIVE | Noted: 2017-12-09

## 2017-12-09 PROBLEM — F11.10 NARCOTIC ABUSE, EPISODIC (H): Status: ACTIVE | Noted: 2017-12-09

## 2017-12-09 PROBLEM — M54.40: Status: ACTIVE | Noted: 2017-12-09

## 2017-12-09 PROBLEM — G43.009 MIGRAINE WITHOUT AURA: Status: ACTIVE | Noted: 2017-12-09

## 2017-12-09 PROBLEM — Z91.199 NON COMPLIANCE WITH MEDICAL TREATMENT: Status: ACTIVE | Noted: 2017-12-09

## 2017-12-09 RX ORDER — ZONISAMIDE 25 MG/1
CAPSULE ORAL
Qty: 360 CAPSULE | Refills: 3 | Status: SHIPPED | OUTPATIENT
Start: 2017-12-09 | End: 2018-07-07

## 2017-12-09 ASSESSMENT — ENCOUNTER SYMPTOMS
HYPERTENSION: 0
DIZZINESS: 1
SPEECH CHANGE: 0
FATIGUE: 1
DYSPNEA ON EXERTION: 0
ABDOMINAL PAIN: 1
SWOLLEN GLANDS: 0
LEG PAIN: 1
COUGH: 0
EYE WATERING: 0
POSTURAL DYSPNEA: 0
MEMORY LOSS: 0
JAUNDICE: 0
HEMOPTYSIS: 0
PARALYSIS: 0
BLOATING: 1
POOR WOUND HEALING: 1
ARTHRALGIAS: 1
HYPOTENSION: 1
MUSCLE WEAKNESS: 1
FEVER: 0
EYE IRRITATION: 1
FLANK PAIN: 1
WEAKNESS: 1
BRUISES/BLEEDS EASILY: 1
CONSTIPATION: 1
TINGLING: 1
TASTE DISTURBANCE: 1
WHEEZING: 1
MYALGIAS: 1
SORE THROAT: 0
SINUS PAIN: 1
NUMBNESS: 1
PALPITATIONS: 1
EYE REDNESS: 1
NIGHT SWEATS: 0
TROUBLE SWALLOWING: 1
CHILLS: 1
SINUS CONGESTION: 1
SNORES LOUDLY: 1
BLOOD IN STOOL: 1
DISTURBANCES IN COORDINATION: 0
VOMITING: 0
INCREASED ENERGY: 1
LOSS OF CONSCIOUSNESS: 0
SMELL DISTURBANCE: 0
BACK PAIN: 1
NECK MASS: 1
RECTAL PAIN: 0
SLEEP DISTURBANCES DUE TO BREATHING: 0
WEIGHT LOSS: 0
SYNCOPE: 0
POLYPHAGIA: 0
SKIN CHANGES: 0
MUSCLE CRAMPS: 1
SEIZURES: 0
WEIGHT GAIN: 0
SHORTNESS OF BREATH: 0
BOWEL INCONTINENCE: 0
EXERCISE INTOLERANCE: 1
HEADACHES: 1
EYE PAIN: 0
DIFFICULTY URINATING: 1
DOUBLE VISION: 1
TREMORS: 1
NECK PAIN: 1
JOINT SWELLING: 1
SPUTUM PRODUCTION: 1
POLYDIPSIA: 1
HOARSE VOICE: 1
DYSURIA: 1
COUGH DISTURBING SLEEP: 0
STIFFNESS: 1
HEMATURIA: 0
DIARRHEA: 1
ALTERED TEMPERATURE REGULATION: 1
HALLUCINATIONS: 0
HEARTBURN: 1
NAIL CHANGES: 0
NAUSEA: 1
ORTHOPNEA: 1
DECREASED APPETITE: 1
LIGHT-HEADEDNESS: 1

## 2017-12-09 ASSESSMENT — PAIN SCALES - GENERAL: PAINLEVEL: SEVERE PAIN (7)

## 2017-12-09 NOTE — LETTER
2017       RE: Ada Welch  [address] [place] [address]     Dear Colleague,    Thank you for referring your patient, Ada Welch, to the [facility]. Please see a copy of my visit note below.    2017         Ellyn Rivera MD   [facility]   [address] [facility] [address]   [place]  [address]      RE: Ada Welch   MRN: [id number]   : 1970      Dear Dr. Rivera:      This is in regard to followup on Ada Welch.  The patient returned today with multiple chief complaints including chronic headaches, neck pain, orthostasis, chronic gastrointestinal complaints and fatigue.      The patient has a very complicated history.  She has had numerous evaluations since I saw her on 10/04/2017.  She was accompanied by her , Flakita, today.  The patient has been seen by Dr. Russell.  His assessment was that she had a past history of Tom-Danlos, Chiari malformation, eosinophilic esophagitis, gastroparesis and recently diagnosed with dysautonomia.  He did indicate she had several signs of autonomic dysfunction.  He did recommend a number of tests and some of these are pending, but she fortunately did have a normal or negative tilt table.  I was able to review that with her and her .  She is going to have followup with the cardiologist in February.  She is wearing a monitor now evidently.  The patient did also have an evaluation through the [facility].  The patient was told to consider tizanidine 8 mg for muscle spasms, ibuprofen and acetaminophen.  Botox was discussed.  The patient is scheduled to have followup in that office.   She was noted to probably have seen a neurosurgeon familiar with [facility] in [place]  I did talk to her about this, but it did appear that evidently this physician was a neurosurgeon who did do neurology.  We have not gotten those  "records yet.      The patient did have assessment through Dr. Norm Batista, the neuro-ophthalmologist.  The patient did not share with him her symptoms of electrokinesis or synesthesia.  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  Aside from mild ocular surface disease, the patient otherwise had normal neuro-ophthalmologic exam.  She had slowly progressive increase in \"floaters\" in both eyes-history and exam indicate this is due to physiologic vitreous syneresis with no evidence of retinal tear-detachment, vitreitis or pathologic etiology.  She also had early presbyopia, and a trial was suggested with over-the-counter readers.        The patient did have also evaluation by the gastroenterologist and I did review with them the assessment with Dr. Griffith.  She had presentation consistent with pelvic floor dysfunction and possible rectocele.  This is all being addressed.      The patient did undergo EMG testing on 11/07/2017 and this through [facility] showed that she had abnormal spontaneous activity restricted left cervical paraspinal muscles.  As Dr. Pickard indicated, this finding may be due to left cervical radiculopathy, focal muscle injury or actual myopathy but when isolated is not diagnostic for any of the above entities.  The patient did assure me she has had injections in the lower back but not in the neck.      The patient was seen by Dr. Moncada.  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  She recommended flexion/extension x-rays of the cervical spine and I did review these studies with her.  There were no obvious abnormalities severe enough to warrant any surgical approach.  The patient was told to wean off her collar as best possible.  She was referred back to the Pain " Clinic where an occipital nerve block could be considered and this evidently was discussed.  Dr. Moncada recommended against Botox, as I did also.  No surgery was offered.  The patient did discuss with me further exercises that could be done for her neck and I did refer her to Aga Ang, nurse practitioner who works with Dr. Moncada, to help set her up with exercises that would be beneficial.      I also reviewed with the patient and her  her previous MRI/MRA studies that were done here and went over the actual films with her.      The patient has had no trouble taking zonisamide.  She said that this has made a large difference in the burning that she has had at the base of her neck and her head and her chronic headaches.  She denied any side effects.  She has had no new skin rash.  She feels her mood is good.  She is drinking at least 7-8 equivalence to 12 ounces of fluid per day.  She describes to me she is probably drinking at least 90 ounces total or more.  The patient has had no trouble whatsoever on the medicine.  She has probably had some weight loss up to 10 pounds but has not documented it.      Her blood pressure today was 127/79 with a pulse 118 seated.  Upon standing, it was 109/70 with a pulse of 125.      I have talked to the patient about squeezing her hands together before getting up and also about getting up slowly.  I encouraged her to continue to follow up with Dr. Russell and her other specialists.  She wants to go higher on her dose of zonesimide, and I gave her now a dosage schedule to get to 150 mg over the next few weeks.  I went over side effects of the drug again with her and her .  I did warn her about the need to have adequate fluid replacement to avoid renal lithiasis and also to watch for skin rash as well as dysphoria.  She will be seen by me in February and on a p.r.n. basis.      I spent 50 minutes with the patient today and over 50% of the time did involve counseling and  coordination of care.      Thank you for allowing me to see this patient.      Sincerely yours,        BERNADETTE WILKINS MD             D: 12/10/2017 13:14   T: 2017 08:57   MT: smitha      Name:     PANCHO HENAO   MRN:      [id number]        Account:      [id number]   :      1970           Service Date: 2017      Document: [id number]

## 2017-12-09 NOTE — MR AVS SNAPSHOT
After Visit Summary   12/9/2017    Ada Welch    MRN: [id number]           Patient Information     Date Of Birth          1970        Visit Information        Provider Department      12/9/2017 9:10 AM Seth Galeano MD [facility] Neurology        Today's Diagnoses     Occipital neuritis           Follow-ups after your visit        Follow-up notes from your care team     Return in about 2 months (around 2/16/2018).      Your next 10 appointments already scheduled     Dec 15, 2017  7:55 AM CST   (Arrive by 7:40 AM)   Return Visit with Ellyn Rivera MD   [facility] ([facility])    [address] [place] [address] [id number] [phone]            Jan 03, 2018 10:45 AM CST   (Arrive by 10:30 AM)   Return Visit with Wade Singh MD   [facility] ([facility])    [address] [place] [address] [id number] [phone]            Feb 05, 2018 11:00 AM CST   (Arrive by 10:45 AM)   Implantable Loop Recorder with Uc Cv Device 1   [facility] ([facility])    [address] [place] [address]  [phone]               Feb 05, 2018 11:30 AM CST   (Arrive by 11:15 AM)   RETURN ARRHYTHMIA with FIDELINA Blanc [facility] ([facility])    [address] [place] [address] [id number] [phone]            Feb 09, 2018 11:00 AM CST   (Arrive by 10:45 AM)   Return Visit with Seth Galeano MD   [facility] ([facility])    [address] [place] [address] [phone] [phone]              Who to contact     Please call your clinic at [phone] to:    Ask questions about your health    Make or cancel appointments    Discuss your medicines    Learn about your test results    Speak  "to your doctor   If you have compliments or concerns about an experience at your clinic, or if you wish to file a complaint, please contact [facility] Patient Relations at [phone] or email us at [email or web address]         Additional Information About Your Visit        [employer] Information     [employer] gives you secure access to your electronic health record. If you see a primary care provider, you can also send messages to your care team and make appointments. If you have questions, please call your primary care clinic.  If you do not have a primary care provider, please call [phone] and they will assist you.      [employer] is an electronic gateway that provides easy, online access to your medical records. With [employer], you can request a clinic appointment, read your test results, renew a prescription or communicate with your care team.     To access your existing account, please contact your [facility] Clinic or call [phone] for assistance.        Care EveryWhere ID     This is your Care EveryWhere ID. This could be used by other organizations to access your [facility] medical records  [id number]        Your Vitals Were     Pulse Temperature Respirations Height Pulse Oximetry Breastfeeding?    97 98.1  F (36.7  C) 20 1.702 m (5' 7\") 97% No    BMI (Body Mass Index)                   30.92 kg/m2            Blood Pressure from Last 3 Encounters:   12/09/17 127/79   11/30/17 135/89   11/22/17 131/83    Weight from Last 3 Encounters:   12/09/17 89.5 kg (197 lb 6.4 oz)   11/30/17 92.4 kg (203 lb 11.2 oz)   11/22/17 93.5 kg (206 lb 1.6 oz)              Today, you had the following     No orders found for display         Today's Medication Changes          These changes are accurate as of: 12/9/17 11:59 PM.  If you have any questions, ask your nurse or doctor.               These medicines have changed or have updated prescriptions.        " Dose/Directions    zonisamide 25 MG capsule   Commonly known as:  Zonegran   This may have changed:  additional instructions   Used for:  Occipital neuritis   Changed by:  Seth Galeano MD        Take 125mg daily for 2 weeks, then 150mg   Quantity:  360 capsule   Refills:  3            Where to get your medicines      These medications were sent to [place] [facility] [address] [facility] - [place], [place] - [address]  [address], [place] [address]     Phone:  [phone]     zonisamide 25 MG capsule                Primary Care Provider Office Phone # Fax #    [place]wey[place]Denise Rivera [phone]       [address]  [facility] [address]        Equal Access to Services     [facility]TA [facility] : Hadchantel Slater, waamie oakes, qaybta kaalmada eileen, odette dykes . So [facility] [phone].    ATENCIÓN: Si habla español, tiene a sweeney disposición servicios gratuitos de asistencia lingüística. Llame al [phone].    We comply with applicable federal civil rights laws and [place] laws. We do not discriminate on the basis of race, color, national origin, age, disability, sex, sexual orientation, or gender identity.            Thank you!     Thank you for choosing [facility] NEUROLOGY  for your care. Our goal is always to provide you with excellent care. Hearing back from our patients is one way we can continue to improve our services. Please take a few minutes to complete the written survey that you may receive in the mail after your visit with us. Thank you!             Your Updated Medication List - Protect others around you: Learn how to safely use, store and throw away your medicines at www.disposemymeds.org.          This list is accurate as of: 12/9/17 11:59 PM.  Always use your most recent med list.                   Brand Name Dispense Instructions for use Diagnosis    ADVAIR DISKUS 250-50 MCG/DOSE diskus inhaler   Generic drug:  fluticasone-salmeterol       Inhale 1 puff into the lungs 2 times daily as needed        albuterol 108 (90 BASE) MCG/ACT Inhaler    PROAIR HFA/PROVENTIL HFA/VENTOLIN HFA     Inhale 2 puffs into the lungs        amitriptyline 10 MG tablet    ELAVIL    90 tablet    Take 3 tablets (30 mg) by mouth At Bedtime    Chronic migraine without aura without status migrainosus, not intractable       buprenorphine 10 MCG/HR WK patch    BUTRANS    4 patch    Place 1 patch onto the skin every 7 days    Postlaminectomy syndrome of lumbar region       celecoxib 100 MG capsule    celeBREX    60 capsule    Take 1 capsule (100 mg) by mouth 2 times daily    Lumbar post-laminectomy syndrome       cetirizine 10 MG tablet    zyrTEC     Take 10 mg by mouth daily        COMPRESSION STOCKINGS     3 each    1 each daily 20-30 mmHg Thigh Length measure and fit, color and style per patient preference. Doff n jose per need.    Orthostatic hypotension       eletriptan 20 MG tablet    RELPAX    18 tablet    Take 1 tablet (20 mg) by mouth at onset of headache for migraine May repeat in 2 hours. Max 4 tablets/24 hours.    Chronic daily headache, Intractable migraine without aura and with status migrainosus       EPINEPHrine 0.3 MG/0.3ML injection 2-pack    EPIPEN/ADRENACLICK/or ANY BX GENERIC EQUIV     Inject 0.3 mg into the muscle        etodolac 400 MG tablet    LODINE     Take 400 mg by mouth        famotidine 20 MG tablet    PEPCID    90 tablet    Take 1 tablet (20 mg) by mouth At Bedtime    Hiatal hernia, Mast cell disorder       fluticasone 50 MCG/ACT spray    FLONASE     2 sprays        ketamine (KETALAR) 5%-gabapentin (NEURONTIN) 8%-lidocaine 2.5% 5%-8% Gel topical PLO cream     30 g    Apply 1 g topically 3 times daily as needed    Postlaminectomy syndrome of lumbosacral region, Postlaminectomy syndrome of lumbar region       linaclotide 145 MCG capsule    LINZESS    30 capsule    Take 1 capsule (145 mcg) by mouth every morning (before breakfast)    Chronic idiopathic  constipation       metoclopramide 5 MG tablet    REGLAN    20 tablet    Take 1 tablet (5 mg) by mouth every 6 hours as needed    Intractable chronic migraine without aura and with status migrainosus       MULTIVITAMIN PO      Take by mouth daily        nitroFURantoin (macrocrystal-monohydrate) 100 MG capsule    MACROBID    10 capsule    Take 1 capsule (100 mg) by mouth 2 times daily    Urinary tract infection       omeprazole 20 MG CR capsule    priLOSEC    90 capsule    Take 1 capsule (20 mg) by mouth daily    Hiatal hernia       ondansetron 4 MG ODT tab    ZOFRAN-ODT    60 tablet    Take 1-2 tablets (4-8 mg) by mouth every 12 hours as needed for nausea    Migraine without status migrainosus, not intractable, unspecified migraine type       polyethylene glycol powder    MIRALAX/GLYCOLAX     1 capful Take 1 capful by mouth daily        PROBIOTIC DAILY PO      Take by mouth 2 times daily 2 packets daily        SUMAtriptan 6 MG/0.5ML pen injector kit    IMITREX STATDOSE    2 kit    Inject 0.5 mLs (6 mg) Subcutaneous at onset of headache for migraine (may repeat once after 2 hrs) May repeat in 1 hour. Max 12 mg/24 hours.    Chronic migraine without aura without status migrainosus, not intractable       SYNTHROID 25 MCG tablet   Generic drug:  levothyroxine      Take 25 mcg by mouth daily        traMADol 50 MG tablet    ULTRAM    30 tablet    Take 1 tablet (50 mg) by mouth every 6 hours as needed for pain maximum 4 tablet(s) per day    Lumbar post-laminectomy syndrome       zonisamide 25 MG capsule    Zonegran    360 capsule    Take 125mg daily for 2 weeks, then 150mg    Occipital neuritis

## 2017-12-11 DIAGNOSIS — M96.1 POST LAMINECTOMY SYNDROME: ICD-10-CM

## 2017-12-11 NOTE — PROGRESS NOTES
2017         Ellyn Rivera MD   [facility]   [address] [facility] [address]   [place]  [address]      RE: Ada Welch   MRN: [id number]   : 1970      Dear Dr. Rivera:      This is in regard to followup on Ada Welch.  The patient returned today with multiple chief complaints including chronic headaches, neck pain, orthostasis, chronic gastrointestinal complaints and fatigue.      The patient has a very complicated history.  She has had numerous evaluations since I saw her on 10/04/2017.  She was accompanied by her , Flakita, today.  The patient has been seen by Dr. Russell.  His assessment was that she had a past history of Tom-Danlos, Chiari malformation, eosinophilic esophagitis, gastroparesis and recently diagnosed with dysautonomia.  He did indicate she had several signs of autonomic dysfunction.  He did recommend a number of tests and some of these are pending, but she fortunately did have a normal or negative tilt table.  I was able to review that with her and her .  She is going to have followup with the cardiologist in February.  She is wearing a monitor now evidently.  The patient did also have an evaluation through the [facility].  The patient was told to consider tizanidine 8 mg for muscle spasms, ibuprofen and acetaminophen.  Botox was discussed.  The patient is scheduled to have followup in that office.   She was noted to probably have seen a neurosurgeon familiar with [facility] in [place].  I did talk to her about this, but it did appear that evidently this physician was a neurosurgeon who did do neurology.  We have not gotten those records yet.      The patient did have assessment through Dr. Norm Batista, the neuro-ophthalmologist.  The patient did not share with him her symptoms of electrokinesis or synesthesia.  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  Aside from mild ocular surface disease, the patient otherwise had normal neuro-ophthalmologic exam.  She had slowly progressive increase in \"floaters\" in both eyes-history and exam indicate this is due to physiologic vitreous syneresis with no evidence of retinal tear-detachment, vitreitis or pathologic etiology.  She also had early presbyopia, and a trial was suggested with over-the-counter readers.        The patient did have also evaluation by the gastroenterologist and I did review with them the assessment with Dr. Griffith.  She had presentation consistent with pelvic floor dysfunction and possible rectocele.  This is all being addressed.      The patient did undergo EMG testing on 11/07/2017 and this through [facility] showed that she had abnormal spontaneous activity restricted left cervical paraspinal muscles.  As Dr. Pickard indicated, this finding may be due to left cervical radiculopathy, focal muscle injury or actual myopathy but when isolated is not diagnostic for any of the above entities.  The patient did assure me she has had injections in the lower back but not in the neck.      The patient was seen by Dr. Moncada.  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  She recommended flexion/extension x-rays of the cervical spine and I did review these studies with her.  There were no obvious abnormalities severe enough to warrant any surgical approach.  The patient was told to wean off her collar as best possible.  She was referred back to the Pain Clinic where an occipital nerve block could be considered and this evidently was discussed.  Dr. Moncada recommended against Botox, as I did also.  No surgery was offered.  The patient did discuss with me further exercises that could be done for her neck and I did refer her to " Aga Ang, nurse practitioner who works with Dr. Moncada, to help set her up with exercises that would be beneficial.      I also reviewed with the patient and her  her previous MRI/MRA studies that were done here and went over the actual films with her.      The patient has had no trouble taking zonisamide.  She said that this has made a large difference in the burning that she has had at the base of her neck and her head and her chronic headaches.  She denied any side effects.  She has had no new skin rash.  She feels her mood is good.  She is drinking at least 7-8 equivalence to 12 ounces of fluid per day.  She describes to me she is probably drinking at least 90 ounces total or more.  The patient has had no trouble whatsoever on the medicine.  She has probably had some weight loss up to 10 pounds but has not documented it.      Her blood pressure today was 127/79 with a pulse 118 seated.  Upon standing, it was 109/70 with a pulse of 125.      I have talked to the patient about squeezing her hands together before getting up and also about getting up slowly.  I encouraged her to continue to follow up with Dr. Russell and her other specialists.  She wants to go higher on her dose of zonesimide, and I gave her now a dosage schedule to get to 150 mg over the next few weeks.  I went over side effects of the drug again with her and her .  I did warn her about the need to have adequate fluid replacement to avoid renal lithiasis and also to watch for skin rash as well as dysphoria.  She will be seen by me in February and on a p.r.n. basis.      I spent 50 minutes with the patient today and over 50% of the time did involve counseling and coordination of care.      Thank you for allowing me to see this patient.      Sincerely yours,      MD BERNADETTE Spence MD             D: 12/10/2017 13:14   T: 12/11/2017 08:57   MT: smitha      Name:     PANCHO HENAO   MRN:      [id number]         Account:      [id number]   :      1970           Service Date: 2017      Document: [id number]

## 2017-12-15 ENCOUNTER — OFFICE VISIT (OUTPATIENT)
Dept: INTERNAL MEDICINE | Facility: CLINIC | Age: 47
End: 2017-12-15
Payer: MEDICARE

## 2017-12-15 VITALS
OXYGEN SATURATION: 97 % | BODY MASS INDEX: 30.67 KG/M2 | DIASTOLIC BLOOD PRESSURE: 82 MMHG | SYSTOLIC BLOOD PRESSURE: 114 MMHG | WEIGHT: 195.8 LBS | HEART RATE: 115 BPM | RESPIRATION RATE: 20 BRPM

## 2017-12-15 DIAGNOSIS — Z12.4 SCREENING FOR CERVICAL CANCER: ICD-10-CM

## 2017-12-15 DIAGNOSIS — J31.0 CHRONIC RHINITIS, UNSPECIFIED TYPE: Primary | ICD-10-CM

## 2017-12-15 RX ORDER — FLUTICASONE PROPIONATE 50 MCG
2 SPRAY, SUSPENSION (ML) NASAL DAILY
Qty: 1 BOTTLE | Refills: 3 | Status: SHIPPED | OUTPATIENT
Start: 2017-12-15 | End: 2018-05-09

## 2017-12-15 ASSESSMENT — PAIN SCALES - GENERAL: PAINLEVEL: MODERATE PAIN (5)

## 2017-12-15 NOTE — PROGRESS NOTES
[facility]  Primary Care Center   Established Patient Encounter   Ellyn Rivera MD  12/15/2017     Chief Complaint:   Results (Discuss results after seeing specialists)  Forms (Needs handicap parking renewed)  Medication Request (Needs to have flonase renewed)       History of Present Illness:   Ada Welch is a 47 year old female with a past medical history notable for Ehler's Danlos syndrome type III, migraines, chronic back pain, possible chiari malformation, autonomic dysfunction, tachycardia, urinary incontinence, pelvic floor dysfunction, IBS, mast cell disorder who returns to clinic for follow up. She has several chronic health issues and has seen several specialists in the interim. Her last visit with me in primary care was in 9/21/17.     Again, we reviewed the symptoms that are associated with her chronic headaches and spine issues. She describes that her pain flares include a pressure-type pain in her occipital scalp and base of her neck with a burning sensation that radiates from her neck into her jaw bilaterally. When this gets really bad, the burning and pain can radiate into her shoulders, upper back, and head. She sometimes feels a limitation with the range of motion in her neck, feeling an internal clicking and catching. She is currently using medical cannibus capsules (x4/day), which she feels is helping both her ongoing pain as well as her sleep difficulties. She is still feeling constant clicking in her neck, but the burning pain sensation has decreased. She describes seeing a specialist for TMJ who said that her right side was dislocated. She has been using a brace for this in the evenings which she also feels is helping. She notes that it is hard to have long days with increased exertion and continues to have difficulties with her chronic issues. Overall, however, she has an increased quality of life than previous and is overall happier with her health.     She has seen multiple  providers in the interim since our last visit, as below:   1. Cardiology, Dr. Rusesll (10/5/17) - she was worked up for mast cell disease with cardiology. She is due for a 24 hour urine.     2. Gastroenterology. Dr. Griffith (11/22/17) - Work-up showed no gastroparesis. They believe that her presentation was consistent with pelvic floor dysfunction with the possibility of rectocele. She was referred to the pelvic floor center for diagnostic studies to evaluate. This will include rectocele rule out, anorectal manometry and biofeedback if needed. She has not yet followed up there.     3. Neurosurgery, Dr. Moncada (12/1/17) - Discussed treatment options including an occipital nerve block through the pain clinic. Although they discussed surgical intervention with fusion, they do not recommend pursuing at this time.       Previous Documentation:  Ehler's Danlos syndrome: Type 3/hypermobility type. 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 He reports having a Beighton score of 7-8 out of 9. She reports having genetic testing done, which was questionable for the MYLK Gene. She follows with a rheumatologist who is an EDS specialist in [place]. He is apparently concerned about some sort of Chiari malformation in the context of her head and neck pain and will be flying to [facility] to visit a special neurosurgeon Dr. Jesus Alberto Sofia. She also struggled with low back pain for many years. She underwent L4 to S1 fusion in 2013 followed by removal of instrumentation, which did not improve her symptoms. She met with Dr. Martinez in the pain clinic and was taken off high doses of Dilaudid and started on Suboxone, which has dramatically improved her symptoms.  She continues to work with physical therapy twice a week as well as other complementary therapies.     Headaches: She does have a history of migraine headaches, as well as a headache which started last week and has remained fairly constant. She reports it started in the back of her  head bilaterally approximately 10 days ago. It has waxed and waned in intensity since this time.She has used only over-the-counter medications thus far. She has some associated nausea. She has no other neurologic changes acutely. He denies any recent changes in medications sleep stress activity or caffeine.     Other issues that she struggles with include chronic tachycardia, for which she has a loop recorder implanted. She describes asthma as well as pulmonary nodules. She has a hiatal hernia as well as chronic sinus issues. She reports some autonomic issues. She apparently has a enlarged lymph node in the cervical region, which was biopsied and found to be benign, under active surveillance. She has chronic urge incontinence as well as incomplete bladder emptying.       Review of Systems:   A full 10-pt Review of Systems was performed, verified and is negative except as documented in the HPI.  All health questionnaires were reviewed, verified and relevant information documented above.     Active Medications:     Current Outpatient Prescriptions:      fluticasone (FLONASE) 50 MCG/ACT spray, Spray 2 sprays into both nostrils daily, Disp: 1 Bottle, Rfl: 3     tiZANidine (ZANAFLEX) 4 MG tablet, TAKE 2 TABLETS BY MOUTH 3 TIMES DAILY AS NEEDED FOR MUSCLE SPASMS, Disp: 90 tablet, Rfl: 3     zonisamide (ZONEGRAN) 25 MG capsule, Take 125mg daily for 2 weeks, then 150mg, Disp: 360 capsule, Rfl: 3     linaclotide (LINZESS) 145 MCG capsule, Take 1 capsule (145 mcg) by mouth every morning (before breakfast), Disp: 30 capsule, Rfl: 3     celecoxib (CELEBREX) 100 MG capsule, Take 1 capsule (100 mg) by mouth 2 times daily, Disp: 60 capsule, Rfl: 1     buprenorphine (BUTRANS) 10 MCG/HR WK patch, Place 1 patch onto the skin every 7 days, Disp: 4 patch, Rfl: 0     traMADol (ULTRAM) 50 MG tablet, Take 1 tablet (50 mg) by mouth every 6 hours as needed for pain maximum 4 tablet(s) per day, Disp: 30 tablet, Rfl: 0     eletriptan (RELPAX)  20 MG tablet, Take 1 tablet (20 mg) by mouth at onset of headache for migraine May repeat in 2 hours. Max 4 tablets/24 hours., Disp: 18 tablet, Rfl: 1     levothyroxine (SYNTHROID) 25 MCG tablet, Take 25 mcg by mouth daily, Disp: , Rfl:      amitriptyline (ELAVIL) 10 MG tablet, Take 3 tablets (30 mg) by mouth At Bedtime, Disp: 90 tablet, Rfl: 2     famotidine (PEPCID) 20 MG tablet, Take 1 tablet (20 mg) by mouth At Bedtime, Disp: 90 tablet, Rfl: 1     omeprazole (PRILOSEC) 20 MG CR capsule, Take 1 capsule (20 mg) by mouth daily, Disp: 90 capsule, Rfl: 1     ondansetron (ZOFRAN-ODT) 4 MG ODT tab, Take 1-2 tablets (4-8 mg) by mouth every 12 hours as needed for nausea, Disp: 60 tablet, Rfl: 1     metoclopramide (REGLAN) 5 MG tablet, Take 1 tablet (5 mg) by mouth every 6 hours as needed, Disp: 20 tablet, Rfl: 1     EPINEPHrine (EPIPEN/ADRENACLICK/OR ANY BX GENERIC EQUIV) 0.3 MG/0.3ML injection 2-pack, Inject 0.3 mg into the muscle, Disp: , Rfl:      albuterol (PROAIR HFA/PROVENTIL HFA/VENTOLIN HFA) 108 (90 BASE) MCG/ACT Inhaler, Inhale 2 puffs into the lungs, Disp: , Rfl:      etodolac (LODINE) 400 MG tablet, Take 400 mg by mouth, Disp: , Rfl:      COMPRESSION STOCKINGS, 1 each daily 20-30 mmHg Thigh Length measure and fit, color and style per patient preference. Doff n jose per need., Disp: 3 each, Rfl: 3     SUMAtriptan (IMITREX STATDOSE) 6 MG/0.5ML pen injector kit, Inject 0.5 mLs (6 mg) Subcutaneous at onset of headache for migraine (may repeat once after 2 hrs) May repeat in 1 hour. Max 12 mg/24 hours., Disp: 2 kit, Rfl: 1     ketamine, KETALAR, 5%-gabapentin, NEURONTIN, 8%-lidocaine 2.5% 5%-8% GEL topical PLO cream, Apply 1 g topically 3 times daily as needed, Disp: 30 g, Rfl: 3     fluticasone-salmeterol (ADVAIR DISKUS) 250-50 MCG/DOSE diskus inhaler, Inhale 1 puff into the lungs 2 times daily as needed , Disp: , Rfl:      cetirizine (ZYRTEC) 10 MG tablet, Take 10 mg by mouth daily , Disp: , Rfl:       polyethylene glycol (MIRALAX/GLYCOLAX) powder, 1 capful Take 1 capful by mouth daily, Disp: , Rfl:      Multiple Vitamins-Minerals (MULTIVITAMIN PO), Take by mouth daily, Disp: , Rfl:      Probiotic Product (PROBIOTIC DAILY PO), Take by mouth 2 times daily 2 packets daily, Disp: , Rfl:       Allergies:   Bee venom; Chicken-derived products (egg); Gabapentin; Gluten meal; Lactose; Milk protein extract; Topiramate; Wheat; and Wheat bran      Past Medical History:  Immunosuppressed status   Hypothyroidism   Hepatomegaly  Arthritis   Clotting disorder   Chronic sinusitis   Tom' danlos disease   GERD  Hiatal hernia   Migraines    Past Surgical History:  Tubal ligation  Nasal/sinus polypectomy   Adenoidectomy   Tonsillectomy     Family History:   Ehler's-Danlos syndrome - mother, sister   Spinal tumor - father  Migraines - father, sister   Thyroid disease - sister   Also significant for heart disease, hypertension, and diabetes mellitus in her grandparents.       Social History:   Former smoker, quit 12/2013.  Rare alcohol use.       Physical Exam:   /82 (BP Location: Right arm, Patient Position: Sitting, Cuff Size: Adult Regular)  Pulse 115  Resp 20  Wt 88.8 kg (195 lb 12.8 oz)  SpO2 97%  BMI 30.67 kg/m2   Constitutional: Alert, oriented, pleasant, no acute distress  Head: Normocephalic, atraumatic  Eyes: Extra-ocular movements intact, no scleral icterus  ENT: Oropharynx clear, moist mucus membranes, good dentition  Neurologic: Alert and oriented  Skin: No rashes/lesions  Psychiatric: normal mentation, affect and mood  Full exam not completed    Assessment and Plan:  1. Reviewed care visits   Recommended that she continue to be diligent with following up with specialists and diagnostic testing discussed in those visits.      2. Chronic rhinitis  Stable. Provided refills, as below.  - fluticasone (FLONASE) 50 MCG/ACT spray  Dispense: 1 Bottle; Refill: 3    3. Routine Healthcare Maintenance   Asked to  schedule a physical with me to review.    4. Screening for cervical cancer  - OB/GYN REFERRAL     5. Updated parking disability       Follow-up: Return in about 4 months (around 4/15/2018) for Physical Exam, come fasting.         Scribe Disclosure:   I, Neela Israel, am serving as a scribe to document services personally performed by Ellyn Rivera MD at this visit, based upon the provider's statements to me. All documentation has been reviewed by the aforementioned provider prior to being entered into the official medical record.     Portions of this medical record were completed by a scribe. UPON MY REVIEW AND AUTHENTICATION BY ELECTRONIC SIGNATURE, this confirms (a) I performed the applicable clinical services, and (b) the record is accurate.    Ellyn Rivera MD  Internal Medicine    40 min spent face to face, of which >50% time spent on counseling/coordinating care exclusive of any procedure time

## 2017-12-15 NOTE — NURSING NOTE
Chief Complaint   Patient presents with     Results     Discuss results after seeing specialists     Forms     Needs handicap parking renewed     Medication Request     Needs to have flonase renewed   Coreen Varner LPN 7:44 AM on 12/15/2017    Rooming Note  Health Maintenance   Health Maintenance Due   Topic Date Due     URINE DRUG SCREEN Q1 YR  12/11/1986     DEPRESSION ACTION PLAN Q1 YR  12/11/1989     PAP SCREENING Q3 YR (SYSTEM ASSIGNED)  12/11/1992     LIPID SCREEN Q5 YR FEMALE (SYSTEM ASSIGNED)  12/11/2016    All health maintenance items discussed and pended.  Coreen Varner LPN 7:47 AM on 12/15/2017

## 2017-12-15 NOTE — PATIENT INSTRUCTIONS
[facility]: [phone]     [facility] Medication Refill Request Information:  * Please contact your pharmacy regarding ANY request for medication refills.  ** [facility] Prescription Fax = [phone]  * Please allow 3 business days for routine medication refills.  * Please allow 5 business days for controlled substance medication refills.     [facility] Test Result notification information:  *You will be notified with in 7-10 days of your appointment day regarding the results of your test.  If you are on MyChart you will be notified as soon as the provider has reviewed the results and signed off on them.

## 2017-12-15 NOTE — MR AVS SNAPSHOT
After Visit Summary   12/15/2017    Ada Welch    MRN: [id number]           Patient Information     Date Of Birth          1970        Visit Information        Provider Department      12/15/2017 7:55 AM Ellyn Rivera MD [facility]        Today's Diagnoses     Chronic rhinitis, unspecified type    -  1    Screening for cervical cancer          Care Instructions    Primary Care Center: [phone]     Primary Care Center Medication Refill Request Information:  * Please contact your pharmacy regarding ANY request for medication refills.  ** [facility] Prescription Fax = [phone]  * Please allow 3 business days for routine medication refills.  * Please allow 5 business days for controlled substance medication refills.     Primary Care Center Test Result notification information:  *You will be notified with in 7-10 days of your appointment day regarding the results of your test.  If you are on MyChart you will be notified as soon as the provider has reviewed the results and signed off on them.          Follow-ups after your visit        Additional Services     OB/GYN REFERRAL       Your provider has referred you to:  [facility]: [facility] [phone]   [email or web address]    Please be aware that coverage of these services is subject to the terms and limitations of your health insurance plan.  Call member services at your health plan with any benefit or coverage questions.      Please bring the following with you to your appointment:    (1) Any X-Rays, CTs or MRIs which have been performed.  Contact the facility where they were done to arrange for  prior to your scheduled appointment.   (2) List of current medications   (3) This referral request   (4) Any documents/labs given to you for this referral                  Follow-up notes from your care team     Return in about 4 months (around  4/15/2018) for Physical Exam, come fasting.      Your next 10 appointments already scheduled     Jan 03, 2018 10:45 AM CST   (Arrive by 10:30 AM)   Return Visit with Waed Singh MD   [facility] ([facility])    [address] [place] [id number] [phone]            Feb 05, 2018 11:00 AM CST   (Arrive by 10:45 AM)   Implantable Loop Recorder with Uc Cv Device 1   [facility] ([facility])    [address] [place] [address]  [id number]               Feb 05, 2018 11:30 AM CST   (Arrive by 11:15 AM)   RETURN ARRHYTHMIA with FIDELINA [facility] ([facility])    [address] [place] [id number] [phone]            Feb 09, 2018 11:00 AM CST   (Arrive by 10:45 AM)   Return Visit with Seth Galeano MD   [facility] ([facility])    [address] [place] [id number] [phone]              Who to contact     Please call your clinic at [phone] to:    Ask questions about your health    Make or cancel appointments    Discuss your medicines    Learn about your test results    Speak to your doctor   If you have compliments or concerns about an experience at your clinic, or if you wish to file a complaint, please contact [facility] Patient Relations at [phone] or email us at [email or web address]         Additional Information About Your Visit        MyChart Information     [employer] gives you secure access to your electronic health record. If you see a primary care provider, you can also send messages to your care team and make appointments. If you have questions, please call your primary care clinic.  If you do not have a primary care provider, please call [phone] and they will  assist you.      [employer] is an electronic gateway that provides easy, online access to your medical records. With [employer], you can request a clinic appointment, read your test results, renew a prescription or communicate with your care team.     To access your existing account, please contact your [facility] or call [phone] for assistance.        Care EveryWhere ID     This is your Care EveryWhere ID. This could be used by other organizations to access your [place] medical records  [id number]        Your Vitals Were     Pulse Respirations Pulse Oximetry BMI (Body Mass Index)          115 20 97% 30.67 kg/m2         Blood Pressure from Last 3 Encounters:   12/15/17 114/82   12/09/17 127/79   11/30/17 135/89    Weight from Last 3 Encounters:   12/15/17 88.8 kg (195 lb 12.8 oz)   12/09/17 89.5 kg (197 lb 6.4 oz)   11/30/17 92.4 kg (203 lb 11.2 oz)              We Performed the Following     OB/GYN REFERRAL          Today's Medication Changes          These changes are accurate as of: 12/15/17  8:42 AM.  If you have any questions, ask your nurse or doctor.               These medicines have changed or have updated prescriptions.        Dose/Directions    fluticasone 50 MCG/ACT spray   Commonly known as:  FLONASE   This may have changed:    - how to take this  - when to take this   Used for:  Chronic rhinitis, unspecified type   Changed by:  Ellyn Rivera MD        Dose:  2 spray   Spray 2 sprays into both nostrils daily   Quantity:  1 Bottle   Refills:  3            Where to get your medicines      These medications were sent to [place] [id number] - [place] - [address]  [address] [address]     Phone:  [phone]     fluticasone 50 MCG/ACT spray                Primary Care Provider Office Phone # Fax #    Ellyn Rivera [phone]       [address] [place] [address]  [facility] [address]        Equal Access to Services     CHRISTINA  GAAR : Hadii aad ku zach Slater, waaxda luqadaha, qaybta kaalmada adeclement, odette jerryin hayaajuanjose fergusonalan chino donis . So [facility] [phone].    ATENCIÓN: Si habla esptim, tiene a sweeney disposición servicios gratuitos de asistencia lingüística. Llame al [phone].    We comply with applicable federal civil rights laws and [place] laws. We do not discriminate on the basis of race, color, national origin, age, disability, sex, sexual orientation, or gender identity.            Thank you!     Thank you for choosing [facility]  for your care. Our goal is always to provide you with excellent care. Hearing back from our patients is one way we can continue to improve our services. Please take a few minutes to complete the written survey that you may receive in the mail after your visit with us. Thank you!             Your Updated Medication List - Protect others around you: Learn how to safely use, store and throw away your medicines at www.disposemymeds.org.          This list is accurate as of: 12/15/17  8:42 AM.  Always use your most recent med list.                   Brand Name Dispense Instructions for use Diagnosis    ADVAIR DISKUS 250-50 MCG/DOSE diskus inhaler   Generic drug:  fluticasone-salmeterol      Inhale 1 puff into the lungs 2 times daily as needed        albuterol 108 (90 BASE) MCG/ACT Inhaler    PROAIR HFA/PROVENTIL HFA/VENTOLIN HFA     Inhale 2 puffs into the lungs        amitriptyline 10 MG tablet    ELAVIL    90 tablet    Take 3 tablets (30 mg) by mouth At Bedtime    Chronic migraine without aura without status migrainosus, not intractable       buprenorphine 10 MCG/HR WK patch    BUTRANS    4 patch    Place 1 patch onto the skin every 7 days    Postlaminectomy syndrome of lumbar region       celecoxib 100 MG capsule    celeBREX    60 capsule    Take 1 capsule (100 mg) by mouth 2 times daily    Lumbar post-laminectomy syndrome       cetirizine 10 MG tablet    zyrTEC     Take  10 mg by mouth daily        COMPRESSION STOCKINGS     3 each    1 each daily 20-30 mmHg Thigh Length measure and fit, color and style per patient preference. Antolin garcia per need.    Orthostatic hypotension       eletriptan 20 MG tablet    RELPAX    18 tablet    Take 1 tablet (20 mg) by mouth at onset of headache for migraine May repeat in 2 hours. Max 4 tablets/24 hours.    Chronic daily headache, Intractable migraine without aura and with status migrainosus       EPINEPHrine 0.3 MG/0.3ML injection 2-pack    EPIPEN/ADRENACLICK/or ANY BX GENERIC EQUIV     Inject 0.3 mg into the muscle        etodolac 400 MG tablet    LODINE     Take 400 mg by mouth        famotidine 20 MG tablet    PEPCID    90 tablet    Take 1 tablet (20 mg) by mouth At Bedtime    Hiatal hernia, Mast cell disorder       fluticasone 50 MCG/ACT spray    FLONASE    1 Bottle    Spray 2 sprays into both nostrils daily    Chronic rhinitis, unspecified type       ketamine (KETALAR) 5%-gabapentin (NEURONTIN) 8%-lidocaine 2.5% 5%-8% Gel topical PLO cream     30 g    Apply 1 g topically 3 times daily as needed    Postlaminectomy syndrome of lumbosacral region, Postlaminectomy syndrome of lumbar region       linaclotide 145 MCG capsule    LINZESS    30 capsule    Take 1 capsule (145 mcg) by mouth every morning (before breakfast)    Chronic idiopathic constipation       metoclopramide 5 MG tablet    REGLAN    20 tablet    Take 1 tablet (5 mg) by mouth every 6 hours as needed    Intractable chronic migraine without aura and with status migrainosus       MULTIVITAMIN PO      Take by mouth daily        omeprazole 20 MG CR capsule    priLOSEC    90 capsule    Take 1 capsule (20 mg) by mouth daily    Hiatal hernia       ondansetron 4 MG ODT tab    ZOFRAN-ODT    60 tablet    Take 1-2 tablets (4-8 mg) by mouth every 12 hours as needed for nausea    Migraine without status migrainosus, not intractable, unspecified migraine type       polyethylene glycol powder     MIRALAX/GLYCOLAX     1 capful Take 1 capful by mouth daily        PROBIOTIC DAILY PO      Take by mouth 2 times daily 2 packets daily        SUMAtriptan 6 MG/0.5ML pen injector kit    IMITREX STATDOSE    2 kit    Inject 0.5 mLs (6 mg) Subcutaneous at onset of headache for migraine (may repeat once after 2 hrs) May repeat in 1 hour. Max 12 mg/24 hours.    Chronic migraine without aura without status migrainosus, not intractable       SYNTHROID 25 MCG tablet   Generic drug:  levothyroxine      Take 25 mcg by mouth daily        tiZANidine 4 MG tablet    ZANAFLEX    90 tablet    TAKE 2 TABLETS BY MOUTH 3 TIMES DAILY AS NEEDED FOR MUSCLE SPASMS    Post laminectomy syndrome       traMADol 50 MG tablet    ULTRAM    30 tablet    Take 1 tablet (50 mg) by mouth every 6 hours as needed for pain maximum 4 tablet(s) per day    Lumbar post-laminectomy syndrome       zonisamide 25 MG capsule    Zonegran    360 capsule    Take 125mg daily for 2 weeks, then 150mg    Occipital neuritis

## 2017-12-22 DIAGNOSIS — M96.1 LUMBAR POST-LAMINECTOMY SYNDROME: ICD-10-CM

## 2017-12-22 RX ORDER — CELECOXIB 100 MG/1
100 CAPSULE ORAL 2 TIMES DAILY
Qty: 60 CAPSULE | Refills: 1 | Status: SHIPPED | OUTPATIENT
Start: 2017-12-22 | End: 2018-02-09

## 2017-12-28 ENCOUNTER — TELEPHONE (OUTPATIENT)
Dept: ANESTHESIOLOGY | Facility: CLINIC | Age: 47
End: 2017-12-28

## 2017-12-28 ENCOUNTER — PRE VISIT (OUTPATIENT)
Dept: UROLOGY | Facility: CLINIC | Age: 47
End: 2017-12-28

## 2017-12-28 NOTE — TELEPHONE ENCOUNTER
----- Message from Lex Roach RN sent at 12/28/2017 11:30 AM CST -----  Regarding: FW: Pt rescheduled her appt with Dr. Martinez tomorrow to 01/08/18, will she still get the Occipital nerve block done at the appt on 01/08/18?  Contact: [phone]   Dunia, can you call Ada, let her know she can still do the block in clinic when she comes in.    Thank you, Lex    ----- Message -----     From: Ashlie Reed     Sent: 12/28/2017  11:08 AM       To: Adult Chronic Pain [facility]  Subject: Pt rescheduled her appt with Dr. Juan lantigua#    Pt rescheduled her appt with Dr. Martinez tomorrow to 01/08/18, will she still get the Occipital nerve block done at the appt on 01/08/18?  Please call the pt back at the number listed above    Thank you,    Jacqueline COSTELLO  Call Ctr    Please DO NOT send this message and/or reply back to sender.  Call Center Representatives DO NOT respond to messages.

## 2017-12-28 NOTE — TELEPHONE ENCOUNTER
LPN called and told pt that they should still be able to have their injection with Dr. Martinez at their clinic appointment on 1/8/18.    Mya Cash LPN

## 2017-12-31 ENCOUNTER — HEALTH MAINTENANCE LETTER (OUTPATIENT)
Age: 47
End: 2017-12-31

## 2018-01-03 ENCOUNTER — OFFICE VISIT (OUTPATIENT)
Dept: UROLOGY | Facility: CLINIC | Age: 48
End: 2018-01-03
Payer: MEDICARE

## 2018-01-03 VITALS
HEART RATE: 99 BPM | WEIGHT: 195 LBS | HEIGHT: 67 IN | SYSTOLIC BLOOD PRESSURE: 111 MMHG | DIASTOLIC BLOOD PRESSURE: 75 MMHG | BODY MASS INDEX: 30.61 KG/M2

## 2018-01-03 DIAGNOSIS — N39.41 URGE INCONTINENCE OF URINE: ICD-10-CM

## 2018-01-03 DIAGNOSIS — R39.15 URINARY URGENCY: Primary | ICD-10-CM

## 2018-01-03 ASSESSMENT — PAIN SCALES - GENERAL: PAINLEVEL: NO PAIN (0)

## 2018-01-03 NOTE — MR AVS SNAPSHOT
After Visit Summary   1/3/2018    Ada Welch    MRN: [id number]           Patient Information     Date Of Birth          1970        Visit Information        Provider Department      1/3/2018 10:45 AM Wade Singh MD [facility] Urologic Cancers        Today's Diagnoses     Urinary urgency    -  1    Urge incontinence of urine           Follow-ups after your visit        Follow-up notes from your care team     Return in about 6 months (around 7/3/2018).      Your next 10 appointments already scheduled     Jan 08, 2018 12:30 PM CST   (Arrive by 12:15 PM)   Return Visit with Faiza Martinez MD   [facility] ([facility])    [address] [place] [address]  4th [facility] [id number] [phone]            Guero 10, 2018 11:00 AM CST   New Patient Visit with FIDELINA Hdz CNP   [facility] ([facility])    [facility] [address]  [address]  [address] [facility] [address]   [phone]            Feb 05, 2018 11:00 AM CST   (Arrive by 10:45 AM)   Implantable Loop Recorder with Uc Cv Device 1   [facility] ([facility])    [address] [place] [id number] [phone]            Feb 05, 2018 11:30 AM CST   (Arrive by 11:15 AM)   RETURN ARRHYTHMIA with FIDELINA Blanc [facility] ([facility])    [address] [place] [address] [phone]            Feb 09, 2018 11:00 AM CST   (Arrive by 10:45 AM)   Return Visit with Seth Galeano MD   [facility] ([facility])    [address] [place] [address] [phone]            Apr 19, 2018 10:55 AM CDT   (Arrive by 10:40 AM)   Return Visit with Ellyn Rivera MD   [facility]  "Primary Care Clinic ([facility])    [address] [id number] [phone]            Jul 11, 2018 11:15 AM CDT   (Arrive by 11:00 AM)   Return Visit with Wade Singh MD   [facility] ([facility])    [address] [place] [id number] [phone]              Who to contact     Please call your clinic at [phone] to:    Ask questions about your health    Make or cancel appointments    Discuss your medicines    Learn about your test results    Speak to your doctor   If you have compliments or concerns about an experience at your clinic, or if you wish to file a complaint, please contact [facility] Patient Relations at [phone] or email us at [email or web address]         Additional Information About Your Visit        [email or web address] Information     [employer] gives you secure access to your electronic health record. If you see a primary care provider, you can also send messages to your care team and make appointments. If you have questions, please call your primary care clinic.  If you do not have a primary care provider, please call [phone] and they will assist you.      [employer] is an electronic gateway that provides easy, online access to your medical records. With [employer], you can request a clinic appointment, read your test results, renew a prescription or communicate with your care team.     To access your existing account, please contact your [facility] or call [phone] for assistance.        Care EveryWhere ID     This is your Care EveryWhere ID. This could be used by other organizations to access your [place] medical records  [id number]        Your Vitals Were     Pulse Height BMI (Body Mass Index)             99 1.702 m (5' 7\") 30.54 kg/m2          Blood Pressure from " Last 3 Encounters:   01/03/18 111/75   12/15/17 114/82   12/09/17 127/79    Weight from Last 3 Encounters:   01/03/18 88.5 kg (195 lb)   12/15/17 88.8 kg (195 lb 12.8 oz)   12/09/17 89.5 kg (197 lb 6.4 oz)              Today, you had the following     No orders found for display       Primary Care Provider Office Phone # Fax #    NesbitDenise Rivera [phone]       [address] [place] [address] [address] [place] [id number]        Equal Access to Services     [facility]: Hadii kia gamboa hadoskar Sonegrito, waaxda luagustín, qaybta kaalmada eileen, odette dykes . So [facility] [phone].    ATENCIÓN: Si habla español, tiene a sweeney disposición servicios gratuitos de asistencia lingüística. [facility] [phone].    We comply with applicable federal civil rights laws and [place] laws. We do not discriminate on the basis of race, color, national origin, age, disability, sex, sexual orientation, or gender identity.            Thank you!     Thank you for choosing [facility]  for your care. Our goal is always to provide you with excellent care. Hearing back from our patients is one way we can continue to improve our services. Please take a few minutes to complete the written survey that you may receive in the mail after your visit with us. Thank you!             Your Updated Medication List - Protect others around you: Learn how to safely use, store and throw away your medicines at www.disposemymeds.org.          This list is accurate as of: 1/3/18 11:15 AM.  Always use your most recent med list.                   Brand Name Dispense Instructions for use Diagnosis    ADVAIR DISKUS 250-50 MCG/DOSE diskus inhaler   Generic drug:  fluticasone-salmeterol      Inhale 1 puff into the lungs 2 times daily as needed        albuterol 108 (90 BASE) MCG/ACT Inhaler    PROAIR HFA/PROVENTIL HFA/VENTOLIN HFA     Inhale 2 puffs into the lungs         amitriptyline 10 MG tablet    ELAVIL    90 tablet    Take 3 tablets (30 mg) by mouth At Bedtime    Chronic migraine without aura without status migrainosus, not intractable       buprenorphine 10 MCG/HR WK patch    BUTRANS    4 patch    Place 1 patch onto the skin every 7 days    Postlaminectomy syndrome of lumbar region       celecoxib 100 MG capsule    celeBREX    60 capsule    Take 1 capsule (100 mg) by mouth 2 times daily    Lumbar post-laminectomy syndrome       cetirizine 10 MG tablet    zyrTEC     Take 10 mg by mouth daily        COMPRESSION STOCKINGS     3 each    1 each daily 20-30 mmHg Thigh Length measure and fit, color and style per patient preference. Doff n jose per need.    Orthostatic hypotension       eletriptan 20 MG tablet    RELPAX    18 tablet    Take 1 tablet (20 mg) by mouth at onset of headache for migraine May repeat in 2 hours. Max 4 tablets/24 hours.    Chronic daily headache, Intractable migraine without aura and with status migrainosus       EPINEPHrine 0.3 MG/0.3ML injection 2-pack    EPIPEN/ADRENACLICK/or ANY BX GENERIC EQUIV     Inject 0.3 mg into the muscle        etodolac 400 MG tablet    LODINE     Take 400 mg by mouth        famotidine 20 MG tablet    PEPCID    90 tablet    Take 1 tablet (20 mg) by mouth At Bedtime    Hiatal hernia, Mast cell disorder       fluticasone 50 MCG/ACT spray    FLONASE    1 Bottle    Spray 2 sprays into both nostrils daily    Chronic rhinitis, unspecified type       ketamine (KETALAR) 5%-gabapentin (NEURONTIN) 8%-lidocaine 2.5% 5%-8% Gel topical PLO cream     30 g    Apply 1 g topically 3 times daily as needed    Postlaminectomy syndrome of lumbosacral region, Postlaminectomy syndrome of lumbar region       linaclotide 145 MCG capsule    LINZESS    30 capsule    Take 1 capsule (145 mcg) by mouth every morning (before breakfast)    Chronic idiopathic constipation       metoclopramide 5 MG tablet    REGLAN    20 tablet    Take 1 tablet (5 mg) by mouth  every 6 hours as needed    Intractable chronic migraine without aura and with status migrainosus       MULTIVITAMIN PO      Take by mouth daily        omeprazole 20 MG CR capsule    priLOSEC    90 capsule    Take 1 capsule (20 mg) by mouth daily    Hiatal hernia       ondansetron 4 MG ODT tab    ZOFRAN-ODT    60 tablet    Take 1-2 tablets (4-8 mg) by mouth every 12 hours as needed for nausea    Migraine without status migrainosus, not intractable, unspecified migraine type       polyethylene glycol powder    MIRALAX/GLYCOLAX     1 capful Take 1 capful by mouth daily        PROBIOTIC DAILY PO      Take by mouth 2 times daily 2 packets daily        SUMAtriptan 6 MG/0.5ML pen injector kit    IMITREX STATDOSE    2 kit    Inject 0.5 mLs (6 mg) Subcutaneous at onset of headache for migraine (may repeat once after 2 hrs) May repeat in 1 hour. Max 12 mg/24 hours.    Chronic migraine without aura without status migrainosus, not intractable       SYNTHROID 25 MCG tablet   Generic drug:  levothyroxine      Take 25 mcg by mouth daily        tiZANidine 4 MG tablet    ZANAFLEX    90 tablet    TAKE 2 TABLETS BY MOUTH 3 TIMES DAILY AS NEEDED FOR MUSCLE SPASMS    Post laminectomy syndrome       traMADol 50 MG tablet    ULTRAM    30 tablet    Take 1 tablet (50 mg) by mouth every 6 hours as needed for pain maximum 4 tablet(s) per day    Lumbar post-laminectomy syndrome       zonisamide 25 MG capsule    Zonegran    360 capsule    Take 125mg daily for 2 weeks, then 150mg    Occipital neuritis

## 2018-01-03 NOTE — NURSING NOTE
Chief Complaint   Patient presents with     RECHECK     history of night time urgency with incontinence coming in for symptom recheck     Patient Active Problem List   Diagnosis     Pain syndrome, chronic     s/p TLIF L4-5,L5-S1 with solid arthrodesis     Tom-Danlos syndrome     Postlaminectomy syndrome, lumbar region     Headache     Cervicalgia     Slow transit constipation     Urinary urgency     Nocturia     Cardiac device in situ- [employer] Implanted Loop Recorder (ILR)     Acquired hypothyroidism     Allergic rhinitis     Anxiety     Anxiety disorder     Astigmatism     Chronic sinusitis with recurrent bronchitis     Arthritis of facet joints at multiple vertebral levels (H)     Arthritis involving multiple sites     Opioid dependence (H)     Cyst of fallopian tube     Depression     DNS (deviated nasal septum)     Drug-seeking behavior     Dysuria     Easy bruising     Encounter for genetic counseling and testing     Essential hypertension with goal blood pressure less than 140/90     Head and neck lymphadenopathy     Hepatomegaly     Keratoconjunctivitis sicca (H)     Low back pain radiating to both legs     Liver lesion     Low back pain     Low back pain of multiple sites of spine with sciatica     Lumbar degenerative disc disease     Lumbar radiculopathy     Lymphadenopathy of left cervical region     Migraine without aura     Narcotic abuse, episodic     Nasal turbinate hypertrophy     Non compliance with medical treatment     Obese     Opioid type dependence, continuous (H)     Other acute postoperative pain     Pain disorder associated with psychological and physical factors     Presbyopia     Spondylolisthesis of lumbar region     Tachycardia     Uterine endometriosis       Allergies   Allergen Reactions     Bee Venom Anaphylaxis, Difficulty breathing, Palpitations, Shortness Of Breath and Visual Disturbance     Patient does carry Epi-Pen     Chicken-Derived Products (Egg) Diarrhea     Other  reaction(s): Nausea  Patient will self monitor  Other reaction(s): GI intolerance     Gabapentin      Gluten Meal      Lactose Dermatitis and Diarrhea     Milk Protein Extract      Other reaction(s): GI intolerance\  EGGS      Topiramate      Wheat Diarrhea     Wheat Bran      Other reaction(s): Nausea  Patient will self monitor       Current Outpatient Prescriptions   Medication Sig Dispense Refill     celecoxib (CELEBREX) 100 MG capsule Take 1 capsule (100 mg) by mouth 2 times daily 60 capsule 1     fluticasone (FLONASE) 50 MCG/ACT spray Spray 2 sprays into both nostrils daily 1 Bottle 3     tiZANidine (ZANAFLEX) 4 MG tablet TAKE 2 TABLETS BY MOUTH 3 TIMES DAILY AS NEEDED FOR MUSCLE SPASMS 90 tablet 3     zonisamide (ZONEGRAN) 25 MG capsule Take 125mg daily for 2 weeks, then 150mg 360 capsule 3     linaclotide (LINZESS) 145 MCG capsule Take 1 capsule (145 mcg) by mouth every morning (before breakfast) 30 capsule 3     buprenorphine (BUTRANS) 10 MCG/HR WK patch Place 1 patch onto the skin every 7 days 4 patch 0     traMADol (ULTRAM) 50 MG tablet Take 1 tablet (50 mg) by mouth every 6 hours as needed for pain maximum 4 tablet(s) per day 30 tablet 0     eletriptan (RELPAX) 20 MG tablet Take 1 tablet (20 mg) by mouth at onset of headache for migraine May repeat in 2 hours. Max 4 tablets/24 hours. 18 tablet 1     levothyroxine (SYNTHROID) 25 MCG tablet Take 25 mcg by mouth daily       amitriptyline (ELAVIL) 10 MG tablet Take 3 tablets (30 mg) by mouth At Bedtime 90 tablet 2     famotidine (PEPCID) 20 MG tablet Take 1 tablet (20 mg) by mouth At Bedtime 90 tablet 1     omeprazole (PRILOSEC) 20 MG CR capsule Take 1 capsule (20 mg) by mouth daily 90 capsule 1     ondansetron (ZOFRAN-ODT) 4 MG ODT tab Take 1-2 tablets (4-8 mg) by mouth every 12 hours as needed for nausea 60 tablet 1     metoclopramide (REGLAN) 5 MG tablet Take 1 tablet (5 mg) by mouth every 6 hours as needed 20 tablet 1     EPINEPHrine  (EPIPEN/ADRENACLICK/OR ANY BX GENERIC EQUIV) 0.3 MG/0.3ML injection 2-pack Inject 0.3 mg into the muscle       albuterol (PROAIR HFA/PROVENTIL HFA/VENTOLIN HFA) 108 (90 BASE) MCG/ACT Inhaler Inhale 2 puffs into the lungs       etodolac (LODINE) 400 MG tablet Take 400 mg by mouth       COMPRESSION STOCKINGS 1 each daily 20-30 mmHg Thigh Length measure and fit, color and style per patient preference. Doff n jose per need. 3 each 3     SUMAtriptan (IMITREX STATDOSE) 6 MG/0.5ML pen injector kit Inject 0.5 mLs (6 mg) Subcutaneous at onset of headache for migraine (may repeat once after 2 hrs) May repeat in 1 hour. Max 12 mg/24 hours. 2 kit 1     ketamine, KETALAR, 5%-gabapentin, NEURONTIN, 8%-lidocaine 2.5% 5%-8% GEL topical PLO cream Apply 1 g topically 3 times daily as needed 30 g 3     fluticasone-salmeterol (ADVAIR DISKUS) 250-50 MCG/DOSE diskus inhaler Inhale 1 puff into the lungs 2 times daily as needed        cetirizine (ZYRTEC) 10 MG tablet Take 10 mg by mouth daily        polyethylene glycol (MIRALAX/GLYCOLAX) powder 1 capful Take 1 capful by mouth daily       Multiple Vitamins-Minerals (MULTIVITAMIN PO) Take by mouth daily       Probiotic Product (PROBIOTIC DAILY PO) Take by mouth 2 times daily 2 packets daily         Social History   Substance Use Topics     Smoking status: Former Smoker     Packs/day: 0.20     Years: 10.00     Types: Cigarettes     Start date: 1/1/1991     Quit date: 12/1/2013     Smokeless tobacco: Never Used      Comment: I smoked on and off during specified dates.     Alcohol use Yes      Comment: couple per year       Jeri Granados LPN  1/3/2018  10:47 AM

## 2018-01-03 NOTE — PROGRESS NOTES
"Reason for Visit: Urinary Urgency    Clinical Data:  Ms. Welch is a 47 year old female with gastroparesis, Ehler's Danlos Syndrome, Mast cell disease,  Chiari malformation, dysautonomia who also experiences urinary urgency with associated incontinence.This problem has been going on since she underwent a spinal fusion in 2013 and has been getting worse. She states that her night time symptoms are significantly worse than daytime.  Worsened when she has constipation.  She was supposed to get a sleep study and see GI.  She was seen by GI and put her on linzess and gave her a referral to pelvic floor center.  This has helped her urgency somewhat.  But she dd not have time to get the sleep study.    She has also been getting intermittent dysuria and dry mouth.  She thinks it may be related to her medication.  She is trying to stay well hydrated.    PEx:   Blood pressure 111/75, pulse 99, height 1.702 m (5' 7\"), weight 88.5 kg (195 lb), not currently breastfeeding.  Data Unavailable, Body mass index is 30.54 kg/(m^2)., 195 lbs 0 oz  Gen appearance:  Well groomed  HEENT:  EOMI, AT NC    UA is completely normal.    Admission on 07/24/2017, Discharged on 07/27/2017   Component Date Value Ref Range Status     Sodium 07/24/2017 141  133 - 144 mmol/L Final     Potassium 07/24/2017 4.0  3.4 - 5.3 mmol/L Final     Chloride 07/24/2017 110* 94 - 109 mmol/L Final     Carbon Dioxide 07/24/2017 27  20 - 32 mmol/L Final     Anion Gap 07/24/2017 5  3 - 14 mmol/L Final     Glucose 07/24/2017 88  70 - 99 mg/dL Final     Urea Nitrogen 07/24/2017 8  7 - 30 mg/dL Final     Creatinine 07/24/2017 0.85  0.52 - 1.04 mg/dL Final     GFR Estimate 07/24/2017 72  >60 mL/min/1.7m2 Final    Non  GFR Calc     GFR Estimate If Black 07/24/2017 87  >60 mL/min/1.7m2 Final    African American GFR Calc     Calcium 07/24/2017 8.9  8.5 - 10.1 mg/dL Final     Bilirubin Total 07/24/2017 0.6  0.2 - 1.3 mg/dL Final     Albumin 07/24/2017 4.1  3.4 " - 5.0 g/dL Final     Protein Total 07/24/2017 7.4  6.8 - 8.8 g/dL Final     Alkaline Phosphatase 07/24/2017 148  40 - 150 U/L Final     ALT 07/24/2017 56* 0 - 50 U/L Final     AST 07/24/2017 36  0 - 45 U/L Final     WBC 07/24/2017 5.7  4.0 - 11.0 10e9/L Final     RBC Count 07/24/2017 4.41  3.8 - 5.2 10e12/L Final     Hemoglobin 07/24/2017 13.5  11.7 - 15.7 g/dL Final     Hematocrit 07/24/2017 39.5  35.0 - 47.0 % Final     MCV 07/24/2017 90  78 - 100 fl Final     MCH 07/24/2017 30.6  26.5 - 33.0 pg Final     MCHC 07/24/2017 34.2  31.5 - 36.5 g/dL Final     RDW 07/24/2017 12.6  10.0 - 15.0 % Final     Platelet Count 07/24/2017 238  150 - 450 10e9/L Final     Diff Method 07/24/2017 Automated Method   Final     % Neutrophils 07/24/2017 61.9  % Final     % Lymphocytes 07/24/2017 29.0  % Final     % Monocytes 07/24/2017 5.1  % Final     % Eosinophils 07/24/2017 4.0  % Final     % Basophils 07/24/2017 0.0  % Final     % Immature Granulocytes 07/24/2017 0.0  % Final     Nucleated RBCs 07/24/2017 0  0 /100 Final     Absolute Neutrophil 07/24/2017 3.5  1.6 - 8.3 10e9/L Final     Absolute Lymphocytes 07/24/2017 1.7  0.8 - 5.3 10e9/L Final     Absolute Monocytes 07/24/2017 0.3  0.0 - 1.3 10e9/L Final     Absolute Eosinophils 07/24/2017 0.2  0.0 - 0.7 10e9/L Final     Absolute Basophils 07/24/2017 0.0  0.0 - 0.2 10e9/L Final     Abs Immature Granulocytes 07/24/2017 0.0  0 - 0.4 10e9/L Final     Absolute Nucleated RBC 07/24/2017 0.0   Final     CRP Inflammation 07/24/2017 <2.9  0.0 - 8.0 mg/L Final     Sed Rate 07/24/2017 10  0 - 20 mm/h Final     INR 07/25/2017 1.01  0.86 - 1.14 Final     WBC CSF 07/26/2017 0  0 - 5 /uL Final     RBC CSF 07/26/2017 0  0 - 2 /uL Final     Tube Number 07/26/2017 4  # Final     Color CSF 07/26/2017 Colorless  CLRL Final     Appearance CSF 07/26/2017 Clear  CLER Final     Glucose CSF 07/26/2017 63  40 - 70 mg/dL Final    CSF glucose concentrations are about 60 percent of normal plasma glucose.      Protein Total CSF 07/26/2017 36  15 - 60 mg/dL Final     Glucose 07/26/2017 98  70 - 99 mg/dL Final       ASSESSMENT and PLAN:  This is a 47 year old female with a complex medical history presenting with  night time urgency with incontinence.  We discussed etiology of her symptoms including bowel bladder dysfunction and possible sleep apnea based on reported history.  Different management options were discussed with the patient including observation, lifestyle modification, and anticholinergics.   The patient has elected to proceed with lifestyle modification   - pt. Will schedule the sleep study to evaluate for sleep apnea   -continue to stay well hydrated  - continue to work on optimizing her bowel regimen.    -if changing her medication does not help with her urinary dysuria then consider a CT scan to evaluate for stones.  -f/u in 6 months to reassess      Thank you for allowing me to participate in Ms. Welch's care.  I will keep you updated on her progress.    Wade Singh MD

## 2018-01-03 NOTE — LETTER
"1/3/2018       RE: Ada Welch  [address] [place] [address]     Dear Colleague,    Thank you for referring your patient, Ada Welch, to the [facility] UROLOGY AND INST FOR PROSTATE AND UROLOGIC CANCERS at [facility]. Please see a copy of my visit note below.    Reason for Visit: Urinary Urgency    Clinical Data:  Ms. Welch is a 47 year old female with gastroparesis, Ehler's Danlos Syndrome, Mast cell disease,  Chiari malformation, dysautonomia who also experiences urinary urgency with associated incontinence.This problem has been going on since she underwent a spinal fusion in 2013 and has been getting worse. She states that her night time symptoms are significantly worse than daytime.  Worsened when she has constipation.  She was supposed to get a sleep study and see GI.  She was seen by GI and put her on linzess and gave her a referral to pelvic floor center.  This has helped her urgency somewhat.  But she dd not have time to get the sleep study.    She has also been getting intermittent dysuria and dry mouth.  She thinks it may be related to her medication.  She is trying to stay well hydrated.    PEx:   Blood pressure 111/75, pulse 99, height 1.702 m (5' 7\"), weight 88.5 kg (195 lb), not currently breastfeeding.  Data Unavailable, Body mass index is 30.54 kg/(m^2)., 195 lbs 0 oz  Gen appearance:  Well groomed  HEENT:  EOMI, AT NC    UA is completely normal.    Admission on 07/24/2017, Discharged on 07/27/2017   Component Date Value Ref Range Status     Sodium 07/24/2017 141  133 - 144 mmol/L Final     Potassium 07/24/2017 4.0  3.4 - 5.3 mmol/L Final     Chloride 07/24/2017 110* 94 - 109 mmol/L Final     Carbon Dioxide 07/24/2017 27  20 - 32 mmol/L Final     Anion Gap 07/24/2017 5  3 - 14 mmol/L Final     Glucose 07/24/2017 88  70 - 99 mg/dL Final     Urea Nitrogen 07/24/2017 8  7 - 30 mg/dL Final     Creatinine 07/24/2017 0.85  0.52 - 1.04 mg/dL Final     " GFR Estimate 07/24/2017 72  >60 mL/min/1.7m2 Final    Non  GFR Calc     GFR Estimate If Black 07/24/2017 87  >60 mL/min/1.7m2 Final    African American GFR Calc     Calcium 07/24/2017 8.9  8.5 - 10.1 mg/dL Final     Bilirubin Total 07/24/2017 0.6  0.2 - 1.3 mg/dL Final     Albumin 07/24/2017 4.1  3.4 - 5.0 g/dL Final     Protein Total 07/24/2017 7.4  6.8 - 8.8 g/dL Final     Alkaline Phosphatase 07/24/2017 148  40 - 150 U/L Final     ALT 07/24/2017 56* 0 - 50 U/L Final     AST 07/24/2017 36  0 - 45 U/L Final     WBC 07/24/2017 5.7  4.0 - 11.0 10e9/L Final     RBC Count 07/24/2017 4.41  3.8 - 5.2 10e12/L Final     Hemoglobin 07/24/2017 13.5  11.7 - 15.7 g/dL Final     Hematocrit 07/24/2017 39.5  35.0 - 47.0 % Final     MCV 07/24/2017 90  78 - 100 fl Final     MCH 07/24/2017 30.6  26.5 - 33.0 pg Final     MCHC 07/24/2017 34.2  31.5 - 36.5 g/dL Final     RDW 07/24/2017 12.6  10.0 - 15.0 % Final     Platelet Count 07/24/2017 238  150 - 450 10e9/L Final     Diff Method 07/24/2017 Automated Method   Final     % Neutrophils 07/24/2017 61.9  % Final     % Lymphocytes 07/24/2017 29.0  % Final     % Monocytes 07/24/2017 5.1  % Final     % Eosinophils 07/24/2017 4.0  % Final     % Basophils 07/24/2017 0.0  % Final     % Immature Granulocytes 07/24/2017 0.0  % Final     Nucleated RBCs 07/24/2017 0  0 /100 Final     Absolute Neutrophil 07/24/2017 3.5  1.6 - 8.3 10e9/L Final     Absolute Lymphocytes 07/24/2017 1.7  0.8 - 5.3 10e9/L Final     Absolute Monocytes 07/24/2017 0.3  0.0 - 1.3 10e9/L Final     Absolute Eosinophils 07/24/2017 0.2  0.0 - 0.7 10e9/L Final     Absolute Basophils 07/24/2017 0.0  0.0 - 0.2 10e9/L Final     Abs Immature Granulocytes 07/24/2017 0.0  0 - 0.4 10e9/L Final     Absolute Nucleated RBC 07/24/2017 0.0   Final     CRP Inflammation 07/24/2017 <2.9  0.0 - 8.0 mg/L Final     Sed Rate 07/24/2017 10  0 - 20 mm/h Final     INR 07/25/2017 1.01  0.86 - 1.14 Final     WBC CSF 07/26/2017 0  0 - 5  /uL Final     RBC CSF 07/26/2017 0  0 - 2 /uL Final     Tube Number 07/26/2017 4  # Final     Color CSF 07/26/2017 Colorless  CLRL Final     Appearance CSF 07/26/2017 Clear  CLER Final     Glucose CSF 07/26/2017 63  40 - 70 mg/dL Final    CSF glucose concentrations are about 60 percent of normal plasma glucose.     Protein Total CSF 07/26/2017 36  15 - 60 mg/dL Final     Glucose 07/26/2017 98  70 - 99 mg/dL Final       ASSESSMENT and PLAN:  This is a 47 year old female with a complex medical history presenting with  night time urgency with incontinence.  We discussed etiology of her symptoms including bowel bladder dysfunction and possible sleep apnea based on reported history.  Different management options were discussed with the patient including observation, lifestyle modification, and anticholinergics.   The patient has elected to proceed with lifestyle modification   - pt. Will schedule the sleep study to evaluate for sleep apnea   -continue to stay well hydrated  - continue to work on optimizing her bowel regimen.    -if changing her medication does not help with her urinary dysuria then consider a CT scan to evaluate for stones.  -f/u in 6 months to reassess      Thank you for allowing me to participate in Ms. Welch's care.  I will keep you updated on her progress.    Wade Singh MD

## 2018-01-03 NOTE — NURSING NOTE
Chief Complaint   Patient presents with     RECHECK     history of night time urgency with incontinence coming in for symptom recheck.     Sharon Pearson

## 2018-01-08 ENCOUNTER — OFFICE VISIT (OUTPATIENT)
Dept: ANESTHESIOLOGY | Facility: CLINIC | Age: 48
End: 2018-01-08
Payer: MEDICARE

## 2018-01-08 VITALS — SYSTOLIC BLOOD PRESSURE: 123 MMHG | HEART RATE: 87 BPM | DIASTOLIC BLOOD PRESSURE: 84 MMHG

## 2018-01-08 DIAGNOSIS — M54.81 BILATERAL OCCIPITAL NEURALGIA: Primary | ICD-10-CM

## 2018-01-08 DIAGNOSIS — M96.1 POSTLAMINECTOMY SYNDROME: ICD-10-CM

## 2018-01-08 RX ORDER — MELOXICAM 7.5 MG/1
7.5 TABLET ORAL
Qty: 60 TABLET | Refills: 1 | Status: SHIPPED | OUTPATIENT
Start: 2018-01-08 | End: 2018-03-09

## 2018-01-08 ASSESSMENT — PAIN SCALES - GENERAL: PAINLEVEL: SEVERE PAIN (6)

## 2018-01-08 NOTE — LETTER
1/8/2018       RE: Ada Welch  [address] [place] [address]     Dear Colleague,    Thank you for referring your patient, Ada Welch, to the [facility] FOR COMPREHENSIVE PAIN MANAGEMENT at [facility]. Please see a copy of my visit note below.    Patient: Ada Welch Age: 47 year old   MRN: [id number] Attending: Dr. Martinez     Date of Visit: January 8, 2018      PAIN MEDICINE CLINIC PROCEDURE NOTE    ATTENDING CLINICIAN:    Faiza Martinez MD    PROCEDURE(S) PERFORMED:  Bilateral greater and lesser occipital nerve block     INDICATIONS:  Ada Welch is a 47 year old female with a history of chronic headache possibly secondary to occipital neuralgia.  The patient states that the patient was in their usual state of health and denied recent anticoagulant use or recent infections.  Therefore, the plan is to perform above mentioned procedure.     Procedure Details:  The patient was met in the exam room, where the patient was identified by name, medical record number and date of birth.  All of the patient s last minute questions were answered. Written informed consent was obtained and saved in the electronic medical record, after the risks, benefits, and alternatives were discussed with the patient.      A formal time-out procedure was performed, as per protocol, including patient name, title of procedure, and site of procedure, and all in the room concurred.  Routine monitors were applied.      The patient was placed in the sitting position on the exam room table. A alcohol prep was completed on area just inferior to insertion of the right and left superior trapezius followed by sterile draping per standard procedure. Using palpation, the area was localized one third the distance between the occipital protuberance and the right mastoid process. After negative aspiration for heme, a 25G 1.5 inch needle was used to inject 1.5 mL of a treatment mixture  containing 1 mL of dexamethasone 4 mg  and 2 mL of bupivacaine 0.25% in a fan like distribution.  The needle was then removed.  The same procedure was performed on the left side. I then performed lesser occipital nerve block by infiltration of 0.5 ml of above solution in the midway between the greater occipital nerve and the mastoid process. The same procedure was performed on the left side.    Light pressure was held at the puncture site(s) to prevent ecchymosis and oozing.  The patient's skin was cleansed, and hemostasis was confirmed.      Condition:    The patient remained awake and alert throughout the procedure.  The patient tolerated the procedure well and was monitored for approximately 15 minutes afterward in the post procedure area.  There were no immediate post procedure complications noted.  The patient was then discharged to home as per protocol.      Pre-procedure pain score: 6/10  Post-procedure pain score: 4/10    Again, thank you for allowing me to participate in the care of your patient.      Sincerely,    Faiza Martinez MD

## 2018-01-08 NOTE — MR AVS SNAPSHOT
After Visit Summary   1/8/2018    Ada Welch    MRN: [id number]           Patient Information     Date Of Birth          1970        Visit Information        Provider Department      1/8/2018 12:30 PM Faiza Martinez MD [facility] for Comprehensive Pain Management        Today's Diagnoses     Postlaminectomy syndrome    -  1      Care Instructions    1. Occipital nerve blocks done today in clinic.      Call us with any concerns for infection: redness and drainage at the injection site, fever, chills, sweats    2. Schedule procedure.     Please call Anne at [phone] to schedule your procedure. She is available Monday - Friday from 9-5:30pm, if she is unavailable to take your call, please leave her a voice message with your name, birth date, and phone number and she will call you back.    Your procedure: Bilateral lumbar medial branch nerve blocks    On the day of the procedure  1. Arrive 1 hour earlier than your scheduled time, to the [facility]  Address: [address] [place] [address]  2. Check in on the 5th floor for your procedure    A nurse will call you within 24 hours after your procedure to follow up.    If you must reschedule your procedure more than two times, you must follow up in clinic before rescheduling again.      Patient Pre-Procedure Instructions    CAUTION - FAILURE TO FOLLOW THESE PRE-PROCEDURE INSTRUCTIONS WILL RESULT IN YOUR PROCEDURE BEING RESCHEDULED.      Pregnancy  If you are pregnant, or think that you may be pregnant, please notify our staff. This may or may not affect the ability to perform the procedure.     You MUST have a  TO TAKE YOU HOME after your procedure. Transportation by Taxi or Para-transit must have a responsible adult accompany you home (other than the ). Travel by bus or light rail is not acceptable transportation. You must provide your 's full name and contact number at time of  check in.   Fasting Protocol You may have NOTHING SOLID TO EAT FOR 8 HOURS prior to arrival at the procedure area.   Broth and candy are considered solid food and require an eight hour fast.   You may have CLEAR LIQUIDS UP TO 2 HOURS prior to arrival at the clinic.   Clear liquids include water, clear fruit juice (no pulp) carbonated beverages, ice, black coffee, black tea (no milk or cream), chewing gum (un-swallowed), and/or clear jello (no fruit or milk). No alcohol containing beverages.   Medications If you take any medications,  DO NOT STOP. Take your medications as usual the morning of your procedure with a sip of water AT LEAST 2 HOURS PRIOR TO ARRIVAL.    Antibiotics If you are currently taking antibiotics, you must complete the entire dose 7 days prior to your scheduled procedure. You must be clear of any signs or symptoms of infection. If you begin antibiotics, please contact our clinic for instructions.   Fever, Chills, or Rash If you experience a fever of higher than 100 degrees, chills, rash, or open wounds during the one week prior to your procedure, please call the clinic.         Medication Hold List  **Patients under Cardiology/Neurology care should consult their provider prior to the pain procedure to verify pre-procedure medication instructions. The information below contains general guidelines.**    Blood Thinners If you are taking daily ASPIRIN, PLAVIX, OR OTHER BLOOD THINNERS SUCH AS COUMADIN/WARFARIN, we will need your prescribing doctor to sign a release permitting you to stop these medications. Once approved by your prescribing doctor - STOP ALL BLOOD THINNERS BASED ON THE TIME TABLE BELOW PRIOR TO YOUR PROCEDURE. If you have been instructed to stop WARFARIN(COUMADIN), you must have an INR lab drawn the day before your procedure. . Your INR must be within normal limits before we can perform your injection. MEDICATIONS CAN BE RESTARTED AFTER YOUR PROCEDURE.    14 DAY HOLD  Ticlid  (ticlopidine)    10 DAY HOLD  Effient (Prasugel)    3 DAY HOLD  Xarelto (rivaroxaban) 7 DAY HOLD  Anacin, Bufferin, Ecotrin, Excedrin, Aggrenox (Aspirin)  Brilinta (ticagrelor)  Coumadin (Warfarin)  Pradexa (Dabigatran)  Elmiron (Pentosan)  Plavix (Clopidogrel Bisulfate)  Pletal (Cilostazol)    24 DAY HOLD  Lovenox (enoxaparin)  Agrylin (Anagrelide)        Non-steroidal Anti-inflammatories (NSAIDs) DO NOT TAKE any non-steroidal anti-inflammatory medications (NSAIDs) listed on the table below. MEDICATIONS CAN BE RESTARTED AFTER YOUR PROCEDURE. Celebrex is OK to take and does not need to be discontinued.     Medications to stop:  3 DAY HOLD  Advil, Motrin (Ibuprofen)  Arthrotec (diciofenac sodium/misoprostol)  Clinoril (Sulindac)  Indocin (Indomethacin)  Lodine (Etodolac)  Toradol (Ketorolac)  Vicoprofen (Hydrocodone and Ibuprofen)  Voltaren (Diclotenac)    14 DAY HOLD  Daypro (Oxaprozin)  Feldene (Piroxicam)   7 DAY HOLD  Aleve (Naproxen sodium)  Darvon compound (contains aspirin)  Naprosyn (Naproxen)  Norgesic Forte (contains aspirin)  Mobic (Meloxicam)  Oruvall (Ketoprofen)  Percodan (contains aspirin)  Relafen (Nabumetone)  Salsalate  Trilisate  Vitamin E (more than 400 mg per day)  Any medication containing aspirin                To speak with a nurse, schedule/reschedule/cancel a clinic appointment, or request a medication refill call: [phone]     You can also reach us by [employer] miniDaTt: [email or web address]            Follow-ups after your visit        Your next 10 appointments already scheduled     Guero 10, 2018 11:00 AM CST   New Patient Visit with FIDELINA Hdz [facility]   [facility] ([facility])    [facility] [address]  [facility] [address]   [phone]            Feb 05, 2018 11:00 AM CST   (Arrive by 10:45 AM)   Implantable Loop Recorder with Uc Cv Device 1   [facility])    [address]  [address]  [facility] [address]   [phone]            Feb 05, 2018 11:30 AM CST   (Arrive by 11:15 AM)   RETURN ARRHYTHMIA with FIDELINA Blanc CNP   [facility] ([facility] [place])    [address]  [address]  [facility] [address]   [phone]            Feb 09, 2018 11:00 AM CST   (Arrive by 10:45 AM)   Return Visit with Seth Galeano MD   [facility] Neurology ([facility] [place])    [address] [place] [address]  3rd Floor  [facility] [address]   [phone]            Apr 19, 2018 10:55 AM CDT   (Arrive by 10:40 AM)   Return Visit with Ellyn Rivera MD   [facility] ([facility])    [address] [place] [address]  [address] [facility] [address] [phone]            Jul 11, 2018 11:15 AM CDT   (Arrive by 11:00 AM)   Return Visit with Wade Singh MD   [facility] and Inst for Prostate and Urologic Cancers ([facility])    [address] [place] [address]  [address] [facility] [address] [phone]              Who to contact     Please call your clinic at [phone] to:    Ask questions about your health    Make or cancel appointments    Discuss your medicines    Learn about your test results    Speak to your doctor   If you have compliments or concerns about an experience at your clinic, or if you wish to file a complaint, please contact [facility] Patient Relations at [phone] or email us at [email or web address]         Additional Information About Your Visit        MyChart Information     FitStarhart gives you secure access to your electronic health record. If you see a primary care provider, you can also send messages to your care team and make appointments. If you have questions, please call your primary care clinic.  If you do not have a primary care provider,  please call [phone] and they will assist you.      Cardley is an electronic gateway that provides easy, online access to your medical records. With Cardley, you can request a clinic appointment, read your test results, renew a prescription or communicate with your care team.     To access your existing account, please contact your [facility] or call [phone] for assistance.        Care EveryWhere ID     This is your Care EveryWhere ID. This could be used by other organizations to access your [place] medical records  [id number]        Your Vitals Were     Pulse                   87            Blood Pressure from Last 3 Encounters:   01/08/18 123/84   01/03/18 111/75   12/15/17 114/82    Weight from Last 3 Encounters:   01/03/18 88.5 kg (195 lb)   12/15/17 88.8 kg (195 lb 12.8 oz)   12/09/17 89.5 kg (197 lb 6.4 oz)              Today, you had the following     No orders found for display         Today's Medication Changes          These changes are accurate as of: 1/8/18  1:34 PM.  If you have any questions, ask your nurse or doctor.               Start taking these medicines.        Dose/Directions    meloxicam 7.5 MG tablet   Commonly known as:  MOBIC   Used for:  Postlaminectomy syndrome   Started by:  Faiza Martinez MD        Dose:  7.5 mg   Take 1 tablet (7.5 mg) by mouth 2 times daily   Quantity:  60 tablet   Refills:  1            Where to get your medicines      These medications were sent to [place] [address] [place] [facility] [address]  [address] [facility] [address]     Phone:  [phone]     meloxicam 7.5 MG tablet                Primary Care Provider Office Phone # Fax #    Ellyn Rivera [phone]       [address]  [facility] [address]        Equal Access to Services     CHRISTINA GUY AH: Ashutosh Slater, waaxda luqadaha, qaybta kaalmada eileen, odette tang. So [facility]  [phone].    ATENCIÓN: Si louis samuel, tiene a sweeney disposición servicios gratuitos de asistencia lingüística. Isaías carr [phone].    We comply with applicable federal civil rights laws and [place] laws. We do not discriminate on the basis of race, color, national origin, age, disability, sex, sexual orientation, or gender identity.            Thank you!     Thank you for choosing [facility] FOR COMPREHENSIVE PAIN MANAGEMENT  for your care. Our goal is always to provide you with excellent care. Hearing back from our patients is one way we can continue to improve our services. Please take a few minutes to complete the written survey that you may receive in the mail after your visit with us. Thank you!             Your Updated Medication List - Protect others around you: Learn how to safely use, store and throw away your medicines at www.disposemymeds.org.          This list is accurate as of: 1/8/18  1:34 PM.  Always use your most recent med list.                   Brand Name Dispense Instructions for use Diagnosis    ADVAIR DISKUS 250-50 MCG/DOSE diskus inhaler   Generic drug:  fluticasone-salmeterol      Inhale 1 puff into the lungs 2 times daily as needed        albuterol 108 (90 BASE) MCG/ACT Inhaler    PROAIR HFA/PROVENTIL HFA/VENTOLIN HFA     Inhale 2 puffs into the lungs        amitriptyline 10 MG tablet    ELAVIL    90 tablet    Take 3 tablets (30 mg) by mouth At Bedtime    Chronic migraine without aura without status migrainosus, not intractable       buprenorphine 10 MCG/HR WK patch    BUTRANS    4 patch    Place 1 patch onto the skin every 7 days    Postlaminectomy syndrome of lumbar region       celecoxib 100 MG capsule    celeBREX    60 capsule    Take 1 capsule (100 mg) by mouth 2 times daily    Lumbar post-laminectomy syndrome       cetirizine 10 MG tablet    zyrTEC     Take 10 mg by mouth daily        COMPRESSION STOCKINGS     3 each    1 each daily 20-30 mmHg Thigh Length measure and  fit, color and style per patient preference. Antolin garcia per need.    Orthostatic hypotension       eletriptan 20 MG tablet    RELPAX    18 tablet    Take 1 tablet (20 mg) by mouth at onset of headache for migraine May repeat in 2 hours. Max 4 tablets/24 hours.    Chronic daily headache, Intractable migraine without aura and with status migrainosus       EPINEPHrine 0.3 MG/0.3ML injection 2-pack    EPIPEN/ADRENACLICK/or ANY BX GENERIC EQUIV     Inject 0.3 mg into the muscle        etodolac 400 MG tablet    LODINE     Take 400 mg by mouth        famotidine 20 MG tablet    PEPCID    90 tablet    Take 1 tablet (20 mg) by mouth At Bedtime    Hiatal hernia, Mast cell disorder       fluticasone 50 MCG/ACT spray    FLONASE    1 Bottle    Spray 2 sprays into both nostrils daily    Chronic rhinitis, unspecified type       ketamine (KETALAR) 5%-gabapentin (NEURONTIN) 8%-lidocaine 2.5% 5%-8% Gel topical PLO cream     30 g    Apply 1 g topically 3 times daily as needed    Postlaminectomy syndrome of lumbosacral region, Postlaminectomy syndrome of lumbar region       linaclotide 145 MCG capsule    LINZESS    30 capsule    Take 1 capsule (145 mcg) by mouth every morning (before breakfast)    Chronic idiopathic constipation       meloxicam 7.5 MG tablet    MOBIC    60 tablet    Take 1 tablet (7.5 mg) by mouth 2 times daily    Postlaminectomy syndrome       metoclopramide 5 MG tablet    REGLAN    20 tablet    Take 1 tablet (5 mg) by mouth every 6 hours as needed    Intractable chronic migraine without aura and with status migrainosus       MULTIVITAMIN PO      Take by mouth daily        omeprazole 20 MG CR capsule    priLOSEC    90 capsule    Take 1 capsule (20 mg) by mouth daily    Hiatal hernia       ondansetron 4 MG ODT tab    ZOFRAN-ODT    60 tablet    Take 1-2 tablets (4-8 mg) by mouth every 12 hours as needed for nausea    Migraine without status migrainosus, not intractable, unspecified migraine type       polyethylene  glycol powder    MIRALAX/GLYCOLAX     1 capful Take 1 capful by mouth daily        PROBIOTIC DAILY PO      Take by mouth 2 times daily 2 packets daily        SUMAtriptan 6 MG/0.5ML pen injector kit    IMITREX STATDOSE    2 kit    Inject 0.5 mLs (6 mg) Subcutaneous at onset of headache for migraine (may repeat once after 2 hrs) May repeat in 1 hour. Max 12 mg/24 hours.    Chronic migraine without aura without status migrainosus, not intractable       SYNTHROID 25 MCG tablet   Generic drug:  levothyroxine      Take 25 mcg by mouth daily        tiZANidine 4 MG tablet    ZANAFLEX    90 tablet    TAKE 2 TABLETS BY MOUTH 3 TIMES DAILY AS NEEDED FOR MUSCLE SPASMS    Post laminectomy syndrome       traMADol 50 MG tablet    ULTRAM    30 tablet    Take 1 tablet (50 mg) by mouth every 6 hours as needed for pain maximum 4 tablet(s) per day    Lumbar post-laminectomy syndrome       zonisamide 25 MG capsule    Zonegran    360 capsule    Take 125mg daily for 2 weeks, then 150mg    Occipital neuritis

## 2018-01-08 NOTE — NURSING NOTE
AVS given and reviewed with pt.  Pre-procedure instructions reviewed with pt, including NPO orders,  needed, and medications to hold. Pt verbalized understanding and declined any questions.     Jessica De Souza, RN, BSN

## 2018-01-08 NOTE — PATIENT INSTRUCTIONS
1. Occipital nerve blocks done today in clinic.      Call us with any concerns for infection: redness and drainage at the injection site, fever, chills, sweats    2. Schedule procedure.     Please call Anne at [phone] to schedule your procedure. She is available Monday - Friday from 9-5:30pm, if she is unavailable to take your call, please leave her a voice message with your name, birth date, and phone number and she will call you back.    Your procedure: Bilateral lumbar medial branch nerve blocks    On the day of the procedure  1. Arrive 1 hour earlier than your scheduled time, to the [facility]  Address: [address] [place] [id number]  2. Check in on the 5th floor for your procedure    A nurse will call you within 24 hours after your procedure to follow up.    If you must reschedule your procedure more than two times, you must follow up in clinic before rescheduling again.      Patient Pre-Procedure Instructions    CAUTION - FAILURE TO FOLLOW THESE PRE-PROCEDURE INSTRUCTIONS WILL RESULT IN YOUR PROCEDURE BEING RESCHEDULED.      Pregnancy  If you are pregnant, or think that you may be pregnant, please notify our staff. This may or may not affect the ability to perform the procedure.     You MUST have a  TO TAKE YOU HOME after your procedure. Transportation by Taxi or Para-transit must have a responsible adult accompany you home (other than the ). Travel by bus or light rail is not acceptable transportation. You must provide your 's full name and contact number at time of check in.   Fasting Protocol You may have NOTHING SOLID TO EAT FOR 8 HOURS prior to arrival at the procedure area.   Broth and candy are considered solid food and require an eight hour fast.   You may have CLEAR LIQUIDS UP TO 2 HOURS prior to arrival at the clinic.   Clear liquids include water, clear fruit juice (no pulp) carbonated beverages, ice, black coffee, black tea (no milk  or cream), chewing gum (un-swallowed), and/or clear jello (no fruit or milk). No alcohol containing beverages.   Medications If you take any medications,  DO NOT STOP. Take your medications as usual the morning of your procedure with a sip of water AT LEAST 2 HOURS PRIOR TO ARRIVAL.    Antibiotics If you are currently taking antibiotics, you must complete the entire dose 7 days prior to your scheduled procedure. You must be clear of any signs or symptoms of infection. If you begin antibiotics, please contact our clinic for instructions.   Fever, Chills, or Rash If you experience a fever of higher than 100 degrees, chills, rash, or open wounds during the one week prior to your procedure, please call the clinic.         Medication Hold List  **Patients under Cardiology/Neurology care should consult their provider prior to the pain procedure to verify pre-procedure medication instructions. The information below contains general guidelines.**    Blood Thinners If you are taking daily ASPIRIN, PLAVIX, OR OTHER BLOOD THINNERS SUCH AS COUMADIN/WARFARIN, we will need your prescribing doctor to sign a release permitting you to stop these medications. Once approved by your prescribing doctor - STOP ALL BLOOD THINNERS BASED ON THE TIME TABLE BELOW PRIOR TO YOUR PROCEDURE. If you have been instructed to stop WARFARIN(COUMADIN), you must have an INR lab drawn the day before your procedure. . Your INR must be within normal limits before we can perform your injection. MEDICATIONS CAN BE RESTARTED AFTER YOUR PROCEDURE.    14 DAY HOLD  Ticlid (ticlopidine)    10 DAY HOLD  Effient (Prasugel)    3 DAY HOLD  Xarelto (rivaroxaban) 7 DAY HOLD  Anacin, Bufferin, Ecotrin, Excedrin, Aggrenox (Aspirin)  Brilinta (ticagrelor)  Coumadin (Warfarin)  Pradexa (Dabigatran)  Elmiron (Pentosan)  Plavix (Clopidogrel Bisulfate)  Pletal (Cilostazol)    24 DAY HOLD  Lovenox (enoxaparin)  Agrylin (Anagrelide)        Non-steroidal Anti-inflammatories  (NSAIDs) DO NOT TAKE any non-steroidal anti-inflammatory medications (NSAIDs) listed on the table below. MEDICATIONS CAN BE RESTARTED AFTER YOUR PROCEDURE. Celebrex is OK to take and does not need to be discontinued.     Medications to stop:  3 DAY HOLD  Advil, Motrin (Ibuprofen)  Arthrotec (diciofenac sodium/misoprostol)  Clinoril (Sulindac)  Indocin (Indomethacin)  Lodine (Etodolac)  Toradol (Ketorolac)  Vicoprofen (Hydrocodone and Ibuprofen)  Voltaren (Diclotenac)    14 DAY HOLD  Daypro (Oxaprozin)  Feldene (Piroxicam)   7 DAY HOLD  Aleve (Naproxen sodium)  Darvon compound (contains aspirin)  Naprosyn (Naproxen)  Norgesic Forte (contains aspirin)  Mobic (Meloxicam)  Oruvall (Ketoprofen)  Percodan (contains aspirin)  Relafen (Nabumetone)  Salsalate  Trilisate  Vitamin E (more than 400 mg per day)  Any medication containing aspirin                To speak with a nurse, schedule/reschedule/cancel a clinic appointment, or request a medication refill call: [phone]     You can also reach us by 2C2P: [email or web address]

## 2018-01-08 NOTE — PROGRESS NOTES
Patient: Ada Welch Age: 47 year old   MRN: [id number] Attending: Dr. Martinez     Date of Visit: January 8, 2018      PAIN MEDICINE CLINIC PROCEDURE NOTE    ATTENDING CLINICIAN:    Faiza Martinez MD    PROCEDURE(S) PERFORMED:  Bilateral greater and lesser occipital nerve block     INDICATIONS:  Ada Welch is a 47 year old female with a history of chronic headache possibly secondary to occipital neuralgia.  The patient states that the patient was in their usual state of health and denied recent anticoagulant use or recent infections.  Therefore, the plan is to perform above mentioned procedure.     Procedure Details:  The patient was met in the exam room, where the patient was identified by name, medical record number and date of birth.  All of the patient s last minute questions were answered. Written informed consent was obtained and saved in the electronic medical record, after the risks, benefits, and alternatives were discussed with the patient.      A formal time-out procedure was performed, as per protocol, including patient name, title of procedure, and site of procedure, and all in the room concurred.  Routine monitors were applied.      The patient was placed in the sitting position on the exam room table. A alcohol prep was completed on area just inferior to insertion of the right and left superior trapezius followed by sterile draping per standard procedure. Using palpation, the area was localized one third the distance between the occipital protuberance and the right mastoid process. After negative aspiration for heme, a 25G 1.5 inch needle was used to inject 1.5 mL of a treatment mixture containing 1 mL of dexamethasone 4 mg  and 2 mL of bupivacaine 0.25% in a fan like distribution.  The needle was then removed.  The same procedure was performed on the left side. I then performed lesser occipital nerve block by infiltration of 0.5 ml of above solution in the midway between the greater occipital  nerve and the mastoid process. The same procedure was performed on the left side.    Light pressure was held at the puncture site(s) to prevent ecchymosis and oozing.  The patient's skin was cleansed, and hemostasis was confirmed.      Condition:    The patient remained awake and alert throughout the procedure.  The patient tolerated the procedure well and was monitored for approximately 15 minutes afterward in the post procedure area.  There were no immediate post procedure complications noted.  The patient was then discharged to home as per protocol.      Pre-procedure pain score: 6/10  Post-procedure pain score: 4/10

## 2018-01-09 DIAGNOSIS — G43.709 CHRONIC MIGRAINE WITHOUT AURA WITHOUT STATUS MIGRAINOSUS, NOT INTRACTABLE: ICD-10-CM

## 2018-01-10 NOTE — TELEPHONE ENCOUNTER
amitriptyline (ELAVIL) 10 MG     Last Written Prescription Date:  9/21/17  Last Fill Quantity: 90,   # refills: 2  Last Office Visit : 12/15/17  Future Office visit:  4/19/18      Routing because: medium drug alert

## 2018-01-11 RX ORDER — AMITRIPTYLINE HYDROCHLORIDE 10 MG/1
30 TABLET ORAL AT BEDTIME
Qty: 270 TABLET | Refills: 3 | Status: SHIPPED | OUTPATIENT
Start: 2018-01-11 | End: 2018-07-07

## 2018-01-17 ENCOUNTER — ALLIED HEALTH/NURSE VISIT (OUTPATIENT)
Dept: CARDIOLOGY | Facility: CLINIC | Age: 48
End: 2018-01-17
Attending: INTERNAL MEDICINE
Payer: COMMERCIAL

## 2018-01-17 DIAGNOSIS — M54.81 BILATERAL OCCIPITAL NEURALGIA: Primary | ICD-10-CM

## 2018-01-17 DIAGNOSIS — R00.0 TACHYCARDIA: Primary | ICD-10-CM

## 2018-01-17 PROCEDURE — 93296 REM INTERROG EVL PM/IDS: CPT | Mod: ZF

## 2018-01-17 RX ORDER — BUTALBITAL, ACETAMINOPHEN AND CAFFEINE 50; 325; 40 MG/1; MG/1; MG/1
1 TABLET ORAL EVERY 4 HOURS PRN
Qty: 10 TABLET | Refills: 0 | Status: SHIPPED | OUTPATIENT
Start: 2018-01-17 | End: 2018-02-09

## 2018-01-17 NOTE — MR AVS SNAPSHOT
After Visit Summary   1/17/2018    Ada Welch    MRN: [id number]           Patient Information     Date Of Birth          1970        Visit Information        Provider Department      1/17/2018 6:00 AM UC ICD REMOTE [facility]        Today's Diagnoses     Tachycardia    -  1       Follow-ups after your visit        Your next 10 appointments already scheduled     Feb 07, 2018 10:45 AM CST   (Arrive by 10:30 AM)   MA SCREENING DIGITAL BILATERAL with URBCMA1   [facility] ([facility])    [address] [place] [address], [address]  [facility] [id number] [phone]           Do not use any powder, lotion or deodorant under your arms or on your breast. If you do, we will ask you to remove it before your exam.  Wear comfortable, two-piece clothing.  If you have any allergies, tell your care team.  Bring any previous mammograms from other facilities or have them mailed to the breast center.            Feb 09, 2018 11:00 AM CST   (Arrive by 10:45 AM)   Return Visit with Seth Galeano MD   [facility] ([facility] [place] [facility])    [address] [place] [address]  3rd [facility] [id number] [phone]            Mar 02, 2018 11:30 AM CST   (Arrive by 11:15 AM)   Implantable Loop Recorder with  Cv Device 1   [facility] ([facility] [place] [facility])    [address] [place] [address]  [address] [place] [id number] [phone]            Mar 02, 2018 12:00 PM CST   (Arrive by 11:45 AM)   RETURN ARRHYTHMIA with FIDELINA Mora CNP   [facility] [place] [facility])    [address] [place] [address]  [address]  [facility] [id number] [phone]            Apr 19, 2018 10:55 AM CDT   (Arrive by 10:40 AM)   Return Visit with Ellyn Rivera MD   [facility] ([facility])    [address] [place] [address]  [address] [facility] [id number] [phone]             Jul 11, 2018 11:15 AM CDT   (Arrive by 11:00 AM)   Return Visit with Wade Singh MD   [facility] ([facility])    [address] [address] [phone]              Who to contact     If you have questions or need follow up information about today's clinic visit or your schedule please contact [facility] directly at [phone].  Normal or non-critical lab and imaging results will be communicated to you by Xinronghart, letter or phone within 4 business days after the clinic has received the results. If you do not hear from us within 7 days, please contact the clinic through "[employer]"t or phone. If you have a critical or abnormal lab result, we will notify you by phone as soon as possible.  Submit refill requests through Klip or call your pharmacy and they will forward the refill request to us. Please allow 3 business days for your refill to be completed.          Additional Information About Your Visit        [email or web address] Information     Klip gives you secure access to your electronic health record. If you see a primary care provider, you can also send messages to your care team and make appointments. If you have questions, please call your primary care clinic.  If you do not have a primary care provider, please call [phone] and they will assist you.        Care EveryWhere ID     This is your Care EveryWhere ID. This could be used by other organizations to access your [facility] medical records  [id number]        Your Vitals Were     Last Period                   01/09/2018 (Exact Date)            Blood Pressure from Last 3 Encounters:   01/08/18 123/84   01/03/18 111/75   12/15/17 114/82    Weight from Last 3 Encounters:   01/19/18 88.5 kg (195 lb 3.2 oz)   01/03/18 88.5 kg (195 lb)   12/15/17 88.8 kg (195 lb 12.8 oz)              We Performed the Following     INTERROGATION DEVICE EVAL  REMOTE, PACER/ICD          Today's Medication Changes          These changes are accurate as of 1/17/18 11:59 PM.  If you have any questions, ask your nurse or doctor.               Start taking these medicines.        Dose/Directions    butalbital-acetaminophen-caffeine -40 MG per tablet   Commonly known as:  FIORICET/ESGIC   Used for:  Bilateral occipital neuralgia   Started by:  Faiza Martinez MD        Dose:  1 tablet   Take 1 tablet by mouth every 4 hours as needed   Quantity:  10 tablet   Refills:  0            Where to get your medicines      Some of these will need a paper prescription and others can be bought over the counter.  Ask your nurse if you have questions.     Bring a paper prescription for each of these medications     butalbital-acetaminophen-caffeine -40 MG per tablet                Primary Care Provider Office Phone # Fax #    HamlerDenise Rivera [phone]       [address] [place] [address] [address] [place] [address]        Equal Access to Services     LETA [facility] Hospital[facility] : Hadii aad ku hadasho Sonegrito, waaxda luqadaha, qaybta kaalmada adeegyada, odette dykes . So [facility] [phone].    ATENCIÓN: Si habla español, tiene a sweeney disposición servicios gratuitos de asistencia lingüística. Llame al [phone].    We comply with applicable federal civil rights laws and [place] laws. We do not discriminate on the basis of race, color, national origin, age, disability, sex, sexual orientation, or gender identity.            Thank you!     Thank you for choosing [facility]  for your care. Our goal is always to provide you with excellent care. Hearing back from our patients is one way we can continue to improve our services. Please take a few minutes to complete the written survey that you may receive in the mail after your visit with us. Thank you!             Your Updated Medication List - Protect others around you: Learn how to safely use, store  and throw away your medicines at www.disposemymeds.org.          This list is accurate as of 1/17/18 11:59 PM.  Always use your most recent med list.                   Brand Name Dispense Instructions for use Diagnosis    ADVAIR DISKUS 250-50 MCG/DOSE diskus inhaler   Generic drug:  fluticasone-salmeterol      Inhale 1 puff into the lungs 2 times daily as needed        albuterol 108 (90 BASE) MCG/ACT Inhaler    PROAIR HFA/PROVENTIL HFA/VENTOLIN HFA     Inhale 2 puffs into the lungs        amitriptyline 10 MG tablet    ELAVIL    270 tablet    Take 3 tablets (30 mg) by mouth At Bedtime    Chronic migraine without aura without status migrainosus, not intractable       butalbital-acetaminophen-caffeine -40 MG per tablet    FIORICET/ESGIC    10 tablet    Take 1 tablet by mouth every 4 hours as needed    Bilateral occipital neuralgia       celecoxib 100 MG capsule    celeBREX    60 capsule    Take 1 capsule (100 mg) by mouth 2 times daily    Lumbar post-laminectomy syndrome       cetirizine 10 MG tablet    zyrTEC     Take 10 mg by mouth daily        COMPRESSION STOCKINGS     3 each    1 each daily 20-30 mmHg Thigh Length measure and fit, color and style per patient preference. Doff n jose per need.    Orthostatic hypotension       eletriptan 20 MG tablet    RELPAX    18 tablet    Take 1 tablet (20 mg) by mouth at onset of headache for migraine May repeat in 2 hours. Max 4 tablets/24 hours.    Chronic daily headache, Intractable migraine without aura and with status migrainosus       EPINEPHrine 0.3 MG/0.3ML injection 2-pack    EPIPEN/ADRENACLICK/or ANY BX GENERIC EQUIV     Inject 0.3 mg into the muscle        etodolac 400 MG tablet    LODINE     Take 400 mg by mouth        famotidine 20 MG tablet    PEPCID    90 tablet    Take 1 tablet (20 mg) by mouth At Bedtime    Hiatal hernia, Mast cell disorder       fluticasone 50 MCG/ACT spray    FLONASE    1 Bottle    Spray 2 sprays into both nostrils daily    Chronic  rhinitis, unspecified type       ketamine (KETALAR) 5%-gabapentin (NEURONTIN) 8%-lidocaine 2.5% 5%-8% Gel topical PLO cream     30 g    Apply 1 g topically 3 times daily as needed    Postlaminectomy syndrome of lumbosacral region, Postlaminectomy syndrome of lumbar region       linaclotide 145 MCG capsule    LINZESS    30 capsule    Take 1 capsule (145 mcg) by mouth every morning (before breakfast)    Chronic idiopathic constipation       meloxicam 7.5 MG tablet    MOBIC    60 tablet    Take 1 tablet (7.5 mg) by mouth 2 times daily    Postlaminectomy syndrome       metoclopramide 5 MG tablet    REGLAN    20 tablet    Take 1 tablet (5 mg) by mouth every 6 hours as needed    Intractable chronic migraine without aura and with status migrainosus       MULTIVITAMIN PO      Take by mouth daily        omeprazole 20 MG CR capsule    priLOSEC    90 capsule    Take 1 capsule (20 mg) by mouth daily    Hiatal hernia       ondansetron 4 MG ODT tab    ZOFRAN-ODT    60 tablet    Take 1-2 tablets (4-8 mg) by mouth every 12 hours as needed for nausea    Migraine without status migrainosus, not intractable, unspecified migraine type       polyethylene glycol powder    MIRALAX/GLYCOLAX     1 capful Take 1 capful by mouth daily        PROBIOTIC DAILY PO      Take by mouth 2 times daily 2 packets daily        SUMAtriptan 6 MG/0.5ML pen injector kit    IMITREX STATDOSE    2 kit    Inject 0.5 mLs (6 mg) Subcutaneous at onset of headache for migraine (may repeat once after 2 hrs) May repeat in 1 hour. Max 12 mg/24 hours.    Chronic migraine without aura without status migrainosus, not intractable       SYNTHROID 25 MCG tablet   Generic drug:  levothyroxine      Take 25 mcg by mouth daily        tiZANidine 4 MG tablet    ZANAFLEX    90 tablet    TAKE 2 TABLETS BY MOUTH 3 TIMES DAILY AS NEEDED FOR MUSCLE SPASMS    Post laminectomy syndrome       traMADol 50 MG tablet    ULTRAM    30 tablet    Take 1 tablet (50 mg) by mouth every 6 hours as  needed for pain maximum 4 tablet(s) per day    Lumbar post-laminectomy syndrome       zonisamide 25 MG capsule    Zonegran    360 capsule    Take 125mg daily for 2 weeks, then 150mg    Occipital neuritis

## 2018-01-19 ENCOUNTER — OFFICE VISIT (OUTPATIENT)
Dept: OBGYN | Facility: CLINIC | Age: 48
End: 2018-01-19
Attending: NURSE PRACTITIONER
Payer: COMMERCIAL

## 2018-01-19 VITALS — WEIGHT: 195.2 LBS | BODY MASS INDEX: 30.64 KG/M2 | HEIGHT: 67 IN

## 2018-01-19 DIAGNOSIS — Z12.4 SCREENING FOR MALIGNANT NEOPLASM OF CERVIX: ICD-10-CM

## 2018-01-19 DIAGNOSIS — Z01.419 ENCOUNTER FOR GYNECOLOGICAL EXAMINATION WITHOUT ABNORMAL FINDING: ICD-10-CM

## 2018-01-19 DIAGNOSIS — Z00.00 VISIT FOR PREVENTIVE HEALTH EXAMINATION: Primary | ICD-10-CM

## 2018-01-19 DIAGNOSIS — Z12.39 BREAST CANCER SCREENING: ICD-10-CM

## 2018-01-19 PROCEDURE — G0145 SCR C/V CYTO,THINLAYER,RESCR: HCPCS | Performed by: NURSE PRACTITIONER

## 2018-01-19 PROCEDURE — 87624 HPV HI-RISK TYP POOLED RSLT: CPT | Performed by: NURSE PRACTITIONER

## 2018-01-19 NOTE — PATIENT INSTRUCTIONS
A pap smear was done today  Please schedule a mammogram at the  before leaving    Continue care with your primary care provider (due for lipid screening at next preventative health visit)    PREVENTIVE HEALTH RECOMMENDATIONS:   Most women need a yearly breast and pelvic exam.     A PAP screen, a test done DURING a pelvic exam, is NO longer recommended yearly.    March 2013, screening guidelines recommended by ACOG for PAP screen are:    1) First pap at age 21.    2) Pap every 3 years until age 30.    3) After age 30, pap every 3 years or Pap with HR HPV screen every 5 years until age 65.  4) Women do NOT need a vaginal Pap screen after a hysterectomy (surgical removal of the uterus) when they have not had cancer.    Exceptions:  1) Yearly pap if HIV+ or immunosuppressed secondary to organ transplant  2) OMAR II-III continue routine screening for 20 years.    I encourage you continue looking for opportunities to choose a healthy lifestyle:       * Choose to eat a heart healthy diet. Check out the FOOD PLATE guidelines at: http://www.PlateJoyplate.gov/ for helpful hints on weight and cholesterol management.  Balance your caloric intake with exercise to maintain a BMI in the 22 to 26 range. For bone health: Eat calcium-rich foods like yogurt, broccoli or take chewable calcium pills (500 to 600 mg) twice a day with food.       * Exercise for at least an average of 30 minutes a day, 5 days of the week. This will help you control your weight, release stress, and help prevent disease.      * Take a Vitamin D3 supplement daily fall through spring and during summer unless you cett33-70' full body sun exposure to skin without sunscreen.      * DO wear sunscreen to prevent skin cancer after the first 15-30 minutes.      * Identify stressors in your life, find ways to release the stress, and, make time for yourself. PLEASE ask for help if mood changes last longer than two weeks.     * Limit alcohol to one drink per  day.  No smoking.  Avoid second hand smoke. If you smoke, ask for help to stop.       *  If you are in a sexual relationship, talk with your partner about possible infection risks and take action to protect yourself from exposure to a sexual infection.    Please request an infection screen for STIs (sexually transmitted infections) if you are less than age 26 OR believe that you may be at risk.     Get a flu shot each year. Get a tetanus shot every 10 years. EVERYONE needs a pertussis (Whooping cough) booster.    See your dentist twice a year for an exam and preventive care cleaning.     Consider the following screen tests:    1) cholesterol test every 5 years.     2) yearly mammogram after age 40 unless you have identified risks.    3) colonoscopy every 10 years after age 50 unless you have identified risks.    4) diabetes blood test screening if you are at risk for diabetes.      Additional information that you may also find helpful:  The Internet now gives us access to LOTS of information -- some of it helpful, research documented and also plenty of harmful, anecdotal information that may not pertain to your situtaion. Consider visiting the following websites for accurate health information:    www.vitamindcouncil.org/ : Info and ongoing research re Vitamin D    www.fairview.org : Up to date and easily searchable information on multiple topics.    www.medlineplus.gov : medication info, interactive tutorials, watch real surgeries online    www.cdc.gov : public health info, travel advisories, epidemics (H1N1)    www.lazaro/std.org: current research re diagnosis, treatment and prevention of sexually contacted infections.    www.health.[facility].[place].us : [place] dept of heatl, public health issues in [place], N1N1    www.familydoctor.org : good info from the Academy of Family Physicians

## 2018-01-19 NOTE — PROGRESS NOTES
Progress Note    SUBJECTIVE:  Ada Welch is an 47 year old, , who requests an Annual Gyn Preventive Exam.   PCP: Dr. Rivera  Patient's medical history is significant for: Ehler's Danlos syndrome type III, migraines, arthritis, hypothyroidism, cervicalgia, chronic back pain, tachycardia (has implanted Loop Recorder in place), possible chiari malformation, blood clotting disorder (possible factor VIII), autonomic dysfunction, tachycardia, urinary incontinence, pelvic floor dysfunction, anxiety, endometriosis, IBS, mast cell disorder.     Concerns today include: none    Contraception: female surgical, tubal ligation    Sexual Hx:  - Sexually active with her ; they met and have been in a monogamous relationship since age 17  - Declines STD testing today  - Denies sexual concerns or dyspareunia.    Menstrual History:  Menstrual History 2017   LAST MENSTRUAL PERIOD 7/10/2017 2018 -   Period Cycle (Days) - - 28   Period Duration (Days) - - 4-7   Method of Contraception - - Tubal ligation   Period Pattern - - Regular   Menstrual Flow - - Moderate   Reviewed Today - - Yes     Menses with mild to moderate cramping, tolerable.  Bleeding is heavy for the first 2-4 days and then light to moderate. Denies intermenstrual bleeding.     Last pap smear: 6 yrs ago, NIL  History of abnormal Pap smear: NO - age 30-65 PAP every 5 years with negative HPV co-testing recommended    Mammogram current: no (patient will schedule)  Last Mammogram: never had a mammogram    Last Colonoscopy: n/a    HISTORY:    Current Outpatient Prescriptions on File Prior to Visit:  butalbital-acetaminophen-caffeine (FIORICET/ESGIC) -40 MG per tablet Take 1 tablet by mouth every 4 hours as needed   amitriptyline (ELAVIL) 10 MG tablet Take 3 tablets (30 mg) by mouth At Bedtime   meloxicam (MOBIC) 7.5 MG tablet Take 1 tablet (7.5 mg) by mouth 2 times daily   celecoxib (CELEBREX) 100 MG capsule Take 1 capsule  (100 mg) by mouth 2 times daily   fluticasone (FLONASE) 50 MCG/ACT spray Spray 2 sprays into both nostrils daily   tiZANidine (ZANAFLEX) 4 MG tablet TAKE 2 TABLETS BY MOUTH 3 TIMES DAILY AS NEEDED FOR MUSCLE SPASMS   zonisamide (ZONEGRAN) 25 MG capsule Take 125mg daily for 2 weeks, then 150mg   linaclotide (LINZESS) 145 MCG capsule Take 1 capsule (145 mcg) by mouth every morning (before breakfast)   traMADol (ULTRAM) 50 MG tablet Take 1 tablet (50 mg) by mouth every 6 hours as needed for pain maximum 4 tablet(s) per day   eletriptan (RELPAX) 20 MG tablet Take 1 tablet (20 mg) by mouth at onset of headache for migraine May repeat in 2 hours. Max 4 tablets/24 hours.   levothyroxine (SYNTHROID) 25 MCG tablet Take 25 mcg by mouth daily   famotidine (PEPCID) 20 MG tablet Take 1 tablet (20 mg) by mouth At Bedtime   omeprazole (PRILOSEC) 20 MG CR capsule Take 1 capsule (20 mg) by mouth daily   ondansetron (ZOFRAN-ODT) 4 MG ODT tab Take 1-2 tablets (4-8 mg) by mouth every 12 hours as needed for nausea   metoclopramide (REGLAN) 5 MG tablet Take 1 tablet (5 mg) by mouth every 6 hours as needed   EPINEPHrine (EPIPEN/ADRENACLICK/OR ANY BX GENERIC EQUIV) 0.3 MG/0.3ML injection 2-pack Inject 0.3 mg into the muscle   albuterol (PROAIR HFA/PROVENTIL HFA/VENTOLIN HFA) 108 (90 BASE) MCG/ACT Inhaler Inhale 2 puffs into the lungs   etodolac (LODINE) 400 MG tablet Take 400 mg by mouth   COMPRESSION STOCKINGS 1 each daily 20-30 mmHg Thigh Length measure and fit, color and style per patient preference. Doff n ojse per need.   SUMAtriptan (IMITREX STATDOSE) 6 MG/0.5ML pen injector kit Inject 0.5 mLs (6 mg) Subcutaneous at onset of headache for migraine (may repeat once after 2 hrs) May repeat in 1 hour. Max 12 mg/24 hours.   ketamine, KETALAR, 5%-gabapentin, NEURONTIN, 8%-lidocaine 2.5% 5%-8% GEL topical PLO cream Apply 1 g topically 3 times daily as needed   fluticasone-salmeterol (ADVAIR DISKUS) 250-50 MCG/DOSE diskus inhaler Inhale 1  puff into the lungs 2 times daily as needed    cetirizine (ZYRTEC) 10 MG tablet Take 10 mg by mouth daily    polyethylene glycol (MIRALAX/GLYCOLAX) powder 1 capful Take 1 capful by mouth daily   Multiple Vitamins-Minerals (MULTIVITAMIN PO) Take by mouth daily   Probiotic Product (PROBIOTIC DAILY PO) Take by mouth 2 times daily 2 packets daily     No current facility-administered medications on file prior to visit.   Allergies   Allergen Reactions     Bee Venom Anaphylaxis, Difficulty breathing, Palpitations, Shortness Of Breath and Visual Disturbance     Patient does carry Epi-Pen     No Clinical Screening - See Comments Anaphylaxis     Chicken-Derived Products (Egg) Diarrhea and Nausea     Patient will self monitor  Other reaction(s): Nausea  Patient will self monitor  Other reaction(s): GI intolerance     Gabapentin      Gluten Meal      Lactose Dermatitis and Diarrhea     Milk Digestant Nausea     Patient is aware of milk sensitivity, will manage own diet     Milk Protein Extract      Other reaction(s): GI intolerance\  EGGS      Topiramate      Wheat Diarrhea     Wheat Bran Nausea     Patient will self monitor  Other reaction(s): Nausea  Patient will self monitor       There is no immunization history on file for this patient.    Obstetric History       T5      L5     SAB1   TAB0   Ectopic0   Multiple0   Live Births0      Past Medical History:   Diagnosis Date     Allergic rhinitis 2007     Arthritis 2013    Found during search for cause of back pain     Autoimmune disease (H) 2016    Immunosuppresive     Bleeding disorder (H) 2016    Bruise easily     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     Chronic sinusitis     Diagnosed with chronic pansinusitis      Chronic tonsillitis     Had tonsils removed 1981, rheumatic fever     Tom-Danlos disease      Gastroesophageal reflux disease 3/1/2017    I have large hiatal hernia     Hernia, abdominal  10/2016    Hiatal Hernia     Hiatal hernia 2016    Have adeline hiatel hernia     Hoarseness Unsure    It comes and goes     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     Migraines 1/1/2007    Unsure of exact date     Nasal polyps 2013    Were revoved 03/2017, benign     Pneumonia Unsure    Have had pneumonia several times     Swelling, mass, or lump in head and neck 08/2016    Lymph node has been growing for last year     Thyroid disease 01/01/2008     Past Surgical History:   Procedure Laterality Date     ADENOIDECTOMY  1981     AS REPAIR OF NASAL SEPTUM       NASAL/SINUS POLYPECTOMY  2017    Polyps were benign     TONSILLECTOMY       TONSILLECTOMY  1981     TUBAL LIGATION       Family History   Problem Relation Age of Onset     Connective Tissue Disorder Mother      eds     Connective Tissue Disorder Sister      eds     CANCER Father      Spinal tumor grew into spinal cord, went in brain     Migraines Father      DIABETES Maternal Grandmother      Elderly diabetes     HEART DISEASE Maternal Grandmother      Hypertension Maternal Grandmother      DIABETES Paternal Grandmother      Elderly diabetes     DIABETES Paternal Grandfather      Elderly diabetes     Asthma Sister      Pediatric Asthma     Migraines Sister      Asthma Son      Pediatric Asthma     Thyroid Disease Sister      ,     Migraines Sister      Social History     Social History     Marital status:      Spouse name: Carry     Number of children: 5     Years of education: N/A     Social History Main Topics     Smoking status: Former Smoker     Packs/day: 0.20     Years: 10.00     Types: Cigarettes     Start date: 1/1/1991     Quit date: 12/1/2013     Smokeless tobacco: Never Used      Comment: I smoked on and off during specified dates.     Alcohol use Yes      Comment: couple per year     Drug use: No     Sexual activity: Yes     Partners: Male     Birth control/ protection: Female Surgical     Other Topics Concern      "None     Social History Narrative    5 kids: 24, 23, 19, 13, 5 yo     works at [employer] for disability           ROS  PHQ-9 SCORE 11/1/2016 8/15/2017 9/21/2017   Total Score MyChart 20 (Severe depression) 0 0   Total Score - 0 0     SAMANTHA-7 SCORE 8/15/2017 9/21/2017 10/25/2017   Total Score 0 (minimal anxiety) 0 (minimal anxiety) 0 (minimal anxiety)   Total Score 0 0 0     Denies IPV. Feels safe. Denies suicidal thoughts.     EXAM:  Height 1.702 m (5' 7\"), weight 88.5 kg (195 lb 3.2 oz), last menstrual period 01/09/2018, not currently breastfeeding. Body mass index is 30.57 kg/(m^2).  General - pleasant female in no acute distress.  Skin - no suspicious lesions or rashes  EENT-  PERRLA, euthyroid with out palpable nodules  Neck - supple without lymphadenopathy.  Lungs - clear to auscultation bilaterally.  Heart - regular rate and rhythm without murmur.  Abdomen - soft, nontender, nondistended, no masses or organomegaly noted.  Musculoskeletal - no gross deformities.  Neurological - normal strength, sensation, and mental status.    Breast Exam:  Breast: Without visible skin changes. No dimpling or lesions seen.   Breasts supple, non-tender with palpation, no dominant mass, nodularity, or nipple discharge noted bilaterally. Axillary nodes negative.      Pelvic Exam:  EG/BUS: Normal genital architecture without lesions, erythema or abnormal secretions; Bartholin's, Urethra, [place]'s normal   Urethral meatus: normal   Urethra: no masses, tenderness, or scarring   Bladder: no masses or tenderness   Vagina: moist, pink, rugae with creamy, white and odorless secretions  Cervix: Multiparous, and pink, moist, closed, without lesion or CMT  Uterus: anteverted, and small, smooth, firm, mobile w/o pain  Adnexa: Within normal limits and No masses, nodularity, tenderness  Rectum: anus normal     ASSESSMENT:  Encounter Diagnoses   Name Primary?     Visit for preventive health examination Yes     Breast " cancer screening      Screening for malignant neoplasm of cervix      Encounter for gynecological examination without abnormal finding         PLAN:   Orders Placed This Encounter   Procedures     Pelvic and Breast Exam Procedure []     Pap Smear Exam [] Do Not Remove     Mammogram Screening Bilateral Digital [DTN187]     Pap imaged thin layer screen with HPV - recommended age 30 - 65 years (select HPV order below)     HPV High Risk Types DNA Cervical     Preventative Health:  - Declines Flu vaccine today   - Pap smear performed today  - Patient to schedule a mammogram  - Continue care with PCP (due for lipid screening, but will leave this for PCP to order so that she can manage results) and other specialty providers    Additional teaching done at this visit regarding calcium (1200 mg per day), self breast exam, exercise, mental health and weight/diet.    Return to clinic in one year.  Follow-up as needed.    Seema Clarke, DNP, APRN, WHNP

## 2018-01-19 NOTE — LETTER
2018       RE: Ada Welch  [address] [place] [address]     Dear Colleague,    Thank you for referring your patient, Ada Welch, to the [facility] at [facility]. Please see a copy of my visit note below.      Progress Note    SUBJECTIVE:  Ada Welch is an 47 year old, , who requests an Annual Gyn Preventive Exam.   PCP: Dr. Rivera  Patient's medical history is significant for: Ehler's Danlos syndrome type III, migraines,  arthritis, hypothyroidism, cervicalgia, chronic back pain, tachycardia (has implanted Loop Recorder in place), possible chiari malformation, blood clotting disorder (possible factor VIII), autonomic dysfunction, tachycardia, urinary incontinence, pelvic floor dysfunction, anxiety, endometriosis, IBS, mast cell disorder.     Concerns today include: none    Contraception: female surgical, tubal ligation    Sexual Hx:  - Sexually active with her ; they met and have been in a monogamous relationship since age 17  - Declines STD testing today  - Denies sexual concerns or dyspareunia.    Menstrual History:  Menstrual History 2017   LAST MENSTRUAL PERIOD 7/10/2017 2018 -   Period Cycle (Days) - - 28   Period Duration (Days) - - 4-7   Method of Contraception - - Tubal ligation   Period Pattern - - Regular   Menstrual Flow - - Moderate   Reviewed Today - - Yes     Menses with mild to moderate cramping, tolerable.  Bleeding is heavy for the first 2-4 days and then light to moderate. Denies intermenstrual bleeding.     Last pap smear: 6 yrs ago, NIL  History of abnormal Pap smear: NO - age 30-65 PAP every 5 years with negative HPV co-testing recommended    Mammogram current: no (patient will schedule)  Last Mammogram: never had a mammogram    Last Colonoscopy: n/a    HISTORY:    Current Outpatient Prescriptions on File Prior to Visit:  butalbital-acetaminophen-caffeine  (FIORICET/ESGIC) -40 MG per tablet Take 1 tablet by mouth every 4 hours as needed   amitriptyline (ELAVIL) 10 MG tablet Take 3 tablets (30 mg) by mouth At Bedtime   meloxicam (MOBIC) 7.5 MG tablet Take 1 tablet (7.5 mg) by mouth 2 times daily   celecoxib (CELEBREX) 100 MG capsule Take 1 capsule (100 mg) by mouth 2 times daily   fluticasone (FLONASE) 50 MCG/ACT spray Spray 2 sprays into both nostrils daily   tiZANidine (ZANAFLEX) 4 MG tablet TAKE 2 TABLETS BY MOUTH 3 TIMES DAILY AS NEEDED FOR MUSCLE SPASMS   zonisamide (ZONEGRAN) 25 MG capsule Take 125mg daily for 2 weeks, then 150mg   linaclotide (LINZESS) 145 MCG capsule Take 1 capsule (145 mcg) by mouth every morning (before breakfast)   traMADol (ULTRAM) 50 MG tablet Take 1 tablet (50 mg) by mouth every 6 hours as needed for pain maximum 4 tablet(s) per day   eletriptan (RELPAX) 20 MG tablet Take 1 tablet (20 mg) by mouth at onset of headache for migraine May repeat in 2 hours. Max 4 tablets/24 hours.   levothyroxine (SYNTHROID) 25 MCG tablet Take 25 mcg by mouth daily   famotidine (PEPCID) 20 MG tablet Take 1 tablet (20 mg) by mouth At Bedtime   omeprazole (PRILOSEC) 20 MG CR capsule Take 1 capsule (20 mg) by mouth daily   ondansetron (ZOFRAN-ODT) 4 MG ODT tab Take 1-2 tablets (4-8 mg) by mouth every 12 hours as needed for nausea   metoclopramide (REGLAN) 5 MG tablet Take 1 tablet (5 mg) by mouth every 6 hours as needed   EPINEPHrine (EPIPEN/ADRENACLICK/OR ANY BX GENERIC EQUIV) 0.3 MG/0.3ML injection 2-pack Inject 0.3 mg into the muscle   albuterol (PROAIR HFA/PROVENTIL HFA/VENTOLIN HFA) 108 (90 BASE) MCG/ACT Inhaler Inhale 2 puffs into the lungs   etodolac (LODINE) 400 MG tablet Take 400 mg by mouth   COMPRESSION STOCKINGS 1 each daily 20-30 mmHg Thigh Length measure and fit, color and style per patient preference. Doff n jose per need.   SUMAtriptan (IMITREX STATDOSE) 6 MG/0.5ML pen injector kit Inject 0.5 mLs (6 mg) Subcutaneous at onset of headache  for migraine (may repeat once after 2 hrs) May repeat in 1 hour. Max 12 mg/24 hours.   ketamine, KETALAR, 5%-gabapentin, NEURONTIN, 8%-lidocaine 2.5% 5%-8% GEL topical PLO cream Apply 1 g topically 3 times daily as needed   fluticasone-salmeterol (ADVAIR DISKUS) 250-50 MCG/DOSE diskus inhaler Inhale 1 puff into the lungs 2 times daily as needed    cetirizine (ZYRTEC) 10 MG tablet Take 10 mg by mouth daily    polyethylene glycol (MIRALAX/GLYCOLAX) powder 1 capful Take 1 capful by mouth daily   Multiple Vitamins-Minerals (MULTIVITAMIN PO) Take by mouth daily   Probiotic Product (PROBIOTIC DAILY PO) Take by mouth 2 times daily 2 packets daily     No current facility-administered medications on file prior to visit.   Allergies   Allergen Reactions     Bee Venom Anaphylaxis, Difficulty breathing, Palpitations, Shortness Of Breath and Visual Disturbance     Patient does carry Epi-Pen     No Clinical Screening - See Comments Anaphylaxis     Chicken-Derived Products (Egg) Diarrhea and Nausea     Patient will self monitor  Other reaction(s): Nausea  Patient will self monitor  Other reaction(s): GI intolerance     Gabapentin      Gluten Meal      Lactose Dermatitis and Diarrhea     Milk Digestant Nausea     Patient is aware of milk sensitivity, will manage own diet     Milk Protein Extract      Other reaction(s): GI intolerance\  EGGS      Topiramate      Wheat Diarrhea     Wheat Bran Nausea     Patient will self monitor  Other reaction(s): Nausea  Patient will self monitor       There is no immunization history on file for this patient.    Obstetric History       T5      L5     SAB1   TAB0   Ectopic0   Multiple0   Live Births0      Past Medical History:   Diagnosis Date     Allergic rhinitis 2007     Arthritis 2013    Found during search for cause of back pain     Autoimmune disease (H) 2016    Immunosuppresive     Bleeding disorder (H) 2016    Bruise easily     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     Chronic sinusitis 00/2007    Diagnosed with chronic pansinusitis 2016     Chronic tonsillitis 1980    Had tonsils removed 02/1981, rheumatic fever     Tom-Danlos disease      Gastroesophageal reflux disease 3/1/2017    I have large hiatal hernia     Hernia, abdominal 10/2016    Hiatal Hernia     Hiatal hernia 2016    Have adeline hiatel hernia     Hoarseness Unsure    It comes and goes     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     Migraines 1/1/2007    Unsure of exact date     Nasal polyps 2013    Were revoved 03/2017, benign     Pneumonia Unsure    Have had pneumonia several times     Swelling, mass, or lump in head and neck 08/2016    Lymph node has been growing for last year     Thyroid disease 01/01/2008     Past Surgical History:   Procedure Laterality Date     ADENOIDECTOMY  1981     AS REPAIR OF NASAL SEPTUM       NASAL/SINUS POLYPECTOMY  2017    Polyps were benign     TONSILLECTOMY       TONSILLECTOMY  1981     TUBAL LIGATION       Family History   Problem Relation Age of Onset     Connective Tissue Disorder Mother      eds     Connective Tissue Disorder Sister      eds     CANCER Father      Spinal tumor grew into spinal cord, went in brain     Migraines Father      DIABETES Maternal Grandmother      Elderly diabetes     HEART DISEASE Maternal Grandmother      Hypertension Maternal Grandmother      DIABETES Paternal Grandmother      Elderly diabetes     DIABETES Paternal Grandfather      Elderly diabetes     Asthma Sister      Pediatric Asthma     Migraines Sister      Asthma Son      Pediatric Asthma     Thyroid Disease Sister      ,     Migraines Sister      Social History     Social History     Marital status:      Spouse name: Carry     Number of children: 5     Years of education: N/A     Social History Main Topics     Smoking status: Former Smoker     Packs/day: 0.20     Years: 10.00     Types: Cigarettes     Start date: 1/1/1991     " Quit date: 12/1/2013     Smokeless tobacco: Never Used      Comment: I smoked on and off during specified dates.     Alcohol use Yes      Comment: couple per year     Drug use: No     Sexual activity: Yes     Partners: Male     Birth control/ protection: Female Surgical     Other Topics Concern     None     Social History Narrative    5 kids: 24, 23, 19, 13, 7 yo     works at [employer] for disability           ROS  PHQ-9 SCORE 11/1/2016 8/15/2017 9/21/2017   Total Score MyChart 20 (Severe depression) 0 0   Total Score - 0 0     SAMANTHA-7 SCORE 8/15/2017 9/21/2017 10/25/2017   Total Score 0 (minimal anxiety) 0 (minimal anxiety) 0 (minimal anxiety)   Total Score 0 0 0     Denies IPV. Feels safe. Denies suicidal thoughts.     EXAM:  Height 1.702 m (5' 7\"), weight 88.5 kg (195 lb 3.2 oz), last menstrual period 01/09/2018, not currently breastfeeding. Body mass index is 30.57 kg/(m^2).  General - pleasant female in no acute distress.  Skin - no suspicious lesions or rashes  EENT-  PERRLA, euthyroid with out palpable nodules  Neck - supple without lymphadenopathy.  Lungs - clear to auscultation bilaterally.  Heart - regular rate and rhythm without murmur.  Abdomen - soft, nontender, nondistended, no masses or organomegaly noted.  Musculoskeletal - no gross deformities.  Neurological - normal strength, sensation, and mental status.    Breast Exam:  Breast: Without visible skin changes. No dimpling or lesions seen.   Breasts supple, non-tender with palpation, no dominant mass, nodularity, or nipple discharge noted bilaterally. Axillary nodes negative.      Pelvic Exam:  EG/BUS: Normal genital architecture without lesions, erythema or abnormal secretions; Bartholin's, Urethra, [place]'s normal   Urethral meatus: normal   Urethra: no masses, tenderness, or scarring   Bladder: no masses or tenderness   Vagina: moist, pink, rugae with creamy, white and odorless secretions  Cervix: Multiparous, and pink, " moist, closed, without lesion or CMT  Uterus: anteverted, and small, smooth, firm, mobile w/o pain  Adnexa: Within normal limits and No masses, nodularity, tenderness  Rectum: anus normal     ASSESSMENT:  Encounter Diagnoses   Name Primary?     Visit for preventive health examination Yes     Breast cancer screening      Screening for malignant neoplasm of cervix      Encounter for gynecological examination without abnormal finding         PLAN:   Orders Placed This Encounter   Procedures     Pelvic and Breast Exam Procedure []     Pap Smear Exam [] Do Not Remove     Mammogram Screening Bilateral Digital [HPV215]     Pap imaged thin layer screen with HPV - recommended age 30 - 65 years (select HPV order below)     HPV High Risk Types DNA Cervical     Preventative Health:  - Declines Flu vaccine today   - Pap smear performed today  - Patient to schedule a mammogram  - Continue care with PCP (due for lipid screening, but will leave this for PCP to order so that she can manage results) and other specialty providers    Additional teaching done at this visit regarding calcium (1200 mg per day), self breast exam, exercise, mental health and weight/diet.    Return to clinic in one year.  Follow-up as needed.    Seema Clarke, DNP, APRN, WHNP

## 2018-01-19 NOTE — MR AVS SNAPSHOT
After Visit Summary   1/19/2018    Ada Welch    MRN: [id number]           Patient Information     Date Of Birth          1970        Visit Information        Provider Department      1/19/2018 11:00 AM Seema Clarke APRN CNP [facility]        Today's Diagnoses     Visit for preventive health examination        Breast cancer screening        Screening for malignant neoplasm of cervix        Encounter for gynecological examination without abnormal finding          Care Instructions    A pap smear was done today  Please schedule a mammogram at the  before leaving    Continue care with your primary care provider (due for lipid screening at next preventative health visit)    PREVENTIVE HEALTH RECOMMENDATIONS:   Most women need a yearly breast and pelvic exam.    A PAP screen, a test done DURING a pelvic exam, is NO longer recommended yearly.    March 2013, screening guidelines recommended by ACOG for PAP screen are:    1) First pap at age 21.    2) Pap every 3 years until age 30.    3) After age 30, pap every 3 years or Pap with HR HPV screen every 5 years until age 65.  4) Women do NOT need a vaginal Pap screen after a hysterectomy (surgical removal of the uterus) when they have not had cancer.    Exceptions:  1) Yearly pap if HIV+ or immunosuppressed secondary to organ transplant  2) OMAR II-III continue routine screening for 20 years.    I encourage you continue looking for opportunities to choose a healthy lifestyle:       * Choose to eat a heart healthy diet. Check out the FOOD PLATE guidelines at: http://www.choosemyplate.gov/ for helpful hints on weight and cholesterol management.  Balance your caloric intake with exercise to maintain a BMI in the 22 to 26 range. For bone health: Eat calcium-rich foods like yogurt, broccoli or take chewable calcium pills (500 to 600 mg) twice a day with food.       * Exercise for at least an average of 30 minutes a  day, 5 days of the week. This will help you control your weight, release stress, and help prevent disease.      * Take a Vitamin D3 supplement daily fall through spring and during summer unless you [id number]' full body sun exposure to skin without sunscreen.      * DO wear sunscreen to prevent skin cancer after the first 15-30 minutes.      * Identify stressors in your life, find ways to release the stress, and, make time for yourself. PLEASE ask for help if mood changes last longer than two weeks.     * Limit alcohol to one drink per day.  No smoking.  Avoid second hand smoke. If you smoke, ask for help to stop.       *  If you are in a sexual relationship, talk with your partner about possible infection risks and take action to protect yourself from exposure to a sexual infection.    Please request an infection screen for STIs (sexually transmitted infections) if you are less than age 26 OR believe that you may be at risk.     Get a flu shot each year. Get a tetanus shot every 10 years. EVERYONE needs a pertussis (Whooping cough) booster.    See your dentist twice a year for an exam and preventive care cleaning.     Consider the following screen tests:    1) cholesterol test every 5 years.     2) yearly mammogram after age 40 unless you have identified risks.    3) colonoscopy every 10 years after age 50 unless you have identified risks.    4) diabetes blood test screening if you are at risk for diabetes.      Additional information that you may also find helpful:  The Internet now gives us access to LOTS of information -- some of it helpful, research documented and also plenty of harmful, anecdotal information that may not pertain to your situtaion. Consider visiting the following websites for accurate health information:    www.vitamindcouncil.org/ : Info and ongoing research re Vitamin D    www.fairview.org : Up to date and easily searchable information on multiple topics.    www.medlineplus.gov : medication  info, interactive tutorials, watch real surgeries online    www.cdc.gov : public health info, travel advisories, epidemics (H1N1)    www.lazaro/std.org: current research re diagnosis, treatment and prevention of sexually contacted infections.    www.health.state.[place] : [place] dept of heat, public health issues in [place], N1N1    www.familydoctor.org : good info from the Academy of Family Physicians                Follow-ups after your visit        Follow-up notes from your care team     Return in 1 year (on 1/19/2019) for Preventative Health Visit.      Your next 10 appointments already scheduled     Feb 09, 2018 11:00 AM CST   (Arrive by 10:45 AM)   Return Visit with Seth Galeano MD   [facility] ([facility])    [address] [place] [id number]   [phone]            Mar 02, 2018 11:30 AM CST   (Arrive by 11:15 AM)   Implantable Loop Recorder with Uc Cv Device 1   [facility] ([facility])    [address] [place] [address]  [address] [place] [id number]   [phone]            Mar 02, 2018 12:00 PM CST   (Arrive by 11:45 AM)   RETURN ARRHYTHMIA with FIDELINA [facility] ([facility])    [address] [place] [id number]   [phone]            Apr 19, 2018 10:55 AM CDT   (Arrive by 10:40 AM)   Return Visit with Ellyn Rivera MD   [facility] ([facility])    [address] [place] [id number] [phone]            Jul 11, 2018 11:15 AM CDT   (Arrive by 11:00 AM)   Return Visit with Wade Singh MD   [facility] ([facility])    [address] [place] [id number] [phone]              Future tests that were ordered for you today     Open Future Orders         "Priority Expected Expires Ordered    Mammogram Screening Bilateral Digital [CVX855] Routine  1/19/2019 1/19/2018            Who to contact     Please call your clinic at [phone] to:    Ask questions about your health    Make or cancel appointments    Discuss your medicines    Learn about your test results    Speak to your doctor   If you have compliments or concerns about an experience at your clinic, or if you wish to file a complaint, please contact [facility] Patient Relations at [phone] or email us at [email or web address]         Additional Information About Your Visit        [employer] Information     [employer] gives you secure access to your electronic health record. If you see a primary care provider, you can also send messages to your care team and make appointments. If you have questions, please call your primary care clinic.  If you do not have a primary care provider, please call [phone] and they will assist you.      [employer] is an electronic gateway that provides easy, online access to your medical records. With [employer], you can request a clinic appointment, read your test results, renew a prescription or communicate with your care team.     To access your existing account, please contact your [facility] or call [phone] for assistance.        Care EveryWhere ID     This is your Care EveryWhere ID. This could be used by other organizations to access your [place] medical records  [id number]        Your Vitals Were     Height Last Period BMI (Body Mass Index)             1.702 m (5' 7\") 01/09/2018 (Exact Date) 30.57 kg/m2          Blood Pressure from Last 3 Encounters:   01/08/18 123/84   01/03/18 111/75   12/15/17 114/82    Weight from Last 3 Encounters:   01/19/18 88.5 kg (195 lb 3.2 oz)   01/03/18 88.5 kg (195 lb)   12/15/17 88.8 kg (195 lb 12.8 oz)              We Performed the Following     HPV High Risk Types DNA " Cervical     Pap imaged thin layer screen with HPV - recommended age 30 - 65 years (select HPV order below)     Pap Smear Exam [] Do Not Remove     Pelvic and Breast Exam Procedure []        Primary Care Provider Office Phone # Fax #    Ellyn Rivera [phone]       [address] [place] [address]  [facility] [address]        Equal Access to Services     [facility] Center[facility]EL : Hadii aad ku hadasho Soomaali, waaxda luqadaha, qaybta kaalmada adeegyada, waxay idiin hayaan adeeg khisamarsh la'júniorn ah. So [facility] [phone].    ATENCIÓN: Si habla español, tiene a sweeney disposición servicios gratuitos de asistencia lingüística. [facility] [phone].    We comply with applicable federal civil rights laws and [place] laws. We do not discriminate on the basis of race, color, national origin, age, disability, sex, sexual orientation, or gender identity.            Thank you!     Thank you for choosing [facility]  for your care. Our goal is always to provide you with excellent care. Hearing back from our patients is one way we can continue to improve our services. Please take a few minutes to complete the written survey that you may receive in the mail after your visit with us. Thank you!             Your Updated Medication List - Protect others around you: Learn how to safely use, store and throw away your medicines at www.disposemymeds.org.          This list is accurate as of: 1/19/18 12:05 PM.  Always use your most recent med list.                   Brand Name Dispense Instructions for use Diagnosis    ADVAIR DISKUS 250-50 MCG/DOSE diskus inhaler   Generic drug:  fluticasone-salmeterol      Inhale 1 puff into the lungs 2 times daily as needed        albuterol 108 (90 BASE) MCG/ACT Inhaler    PROAIR HFA/PROVENTIL HFA/VENTOLIN HFA     Inhale 2 puffs into the lungs        amitriptyline 10 MG tablet    ELAVIL    270 tablet    Take 3 tablets (30 mg) by mouth At Bedtime    Chronic migraine  without aura without status migrainosus, not intractable       buprenorphine 10 MCG/HR WK patch    BUTRANS    4 patch    Place 1 patch onto the skin every 7 days    Postlaminectomy syndrome of lumbar region       butalbital-acetaminophen-caffeine -40 MG per tablet    FIORICET/ESGIC    10 tablet    Take 1 tablet by mouth every 4 hours as needed    Bilateral occipital neuralgia       celecoxib 100 MG capsule    celeBREX    60 capsule    Take 1 capsule (100 mg) by mouth 2 times daily    Lumbar post-laminectomy syndrome       cetirizine 10 MG tablet    zyrTEC     Take 10 mg by mouth daily        COMPRESSION STOCKINGS     3 each    1 each daily 20-30 mmHg Thigh Length measure and fit, color and style per patient preference. Doff n jose per need.    Orthostatic hypotension       eletriptan 20 MG tablet    RELPAX    18 tablet    Take 1 tablet (20 mg) by mouth at onset of headache for migraine May repeat in 2 hours. Max 4 tablets/24 hours.    Chronic daily headache, Intractable migraine without aura and with status migrainosus       EPINEPHrine 0.3 MG/0.3ML injection 2-pack    EPIPEN/ADRENACLICK/or ANY BX GENERIC EQUIV     Inject 0.3 mg into the muscle        etodolac 400 MG tablet    LODINE     Take 400 mg by mouth        famotidine 20 MG tablet    PEPCID    90 tablet    Take 1 tablet (20 mg) by mouth At Bedtime    Hiatal hernia, Mast cell disorder       fluticasone 50 MCG/ACT spray    FLONASE    1 Bottle    Spray 2 sprays into both nostrils daily    Chronic rhinitis, unspecified type       ketamine (KETALAR) 5%-gabapentin (NEURONTIN) 8%-lidocaine 2.5% 5%-8% Gel topical PLO cream     30 g    Apply 1 g topically 3 times daily as needed    Postlaminectomy syndrome of lumbosacral region, Postlaminectomy syndrome of lumbar region       linaclotide 145 MCG capsule    LINZESS    30 capsule    Take 1 capsule (145 mcg) by mouth every morning (before breakfast)    Chronic idiopathic constipation       meloxicam 7.5 MG  tablet    MOBIC    60 tablet    Take 1 tablet (7.5 mg) by mouth 2 times daily    Postlaminectomy syndrome       metoclopramide 5 MG tablet    REGLAN    20 tablet    Take 1 tablet (5 mg) by mouth every 6 hours as needed    Intractable chronic migraine without aura and with status migrainosus       MULTIVITAMIN PO      Take by mouth daily        omeprazole 20 MG CR capsule    priLOSEC    90 capsule    Take 1 capsule (20 mg) by mouth daily    Hiatal hernia       ondansetron 4 MG ODT tab    ZOFRAN-ODT    60 tablet    Take 1-2 tablets (4-8 mg) by mouth every 12 hours as needed for nausea    Migraine without status migrainosus, not intractable, unspecified migraine type       polyethylene glycol powder    MIRALAX/GLYCOLAX     1 capful Take 1 capful by mouth daily        PROBIOTIC DAILY PO      Take by mouth 2 times daily 2 packets daily        SUMAtriptan 6 MG/0.5ML pen injector kit    IMITREX STATDOSE    2 kit    Inject 0.5 mLs (6 mg) Subcutaneous at onset of headache for migraine (may repeat once after 2 hrs) May repeat in 1 hour. Max 12 mg/24 hours.    Chronic migraine without aura without status migrainosus, not intractable       SYNTHROID 25 MCG tablet   Generic drug:  levothyroxine      Take 25 mcg by mouth daily        tiZANidine 4 MG tablet    ZANAFLEX    90 tablet    TAKE 2 TABLETS BY MOUTH 3 TIMES DAILY AS NEEDED FOR MUSCLE SPASMS    Post laminectomy syndrome       traMADol 50 MG tablet    ULTRAM    30 tablet    Take 1 tablet (50 mg) by mouth every 6 hours as needed for pain maximum 4 tablet(s) per day    Lumbar post-laminectomy syndrome       zonisamide 25 MG capsule    Zonegran    360 capsule    Take 125mg daily for 2 weeks, then 150mg    Occipital neuritis

## 2018-01-23 LAB
COPATH REPORT: NORMAL
PAP: NORMAL

## 2018-01-25 DIAGNOSIS — M96.1 POSTLAMINECTOMY SYNDROME OF LUMBAR REGION: ICD-10-CM

## 2018-01-25 LAB
FINAL DIAGNOSIS: NORMAL
HPV HR 12 DNA CVX QL NAA+PROBE: NEGATIVE
HPV16 DNA SPEC QL NAA+PROBE: NEGATIVE
HPV18 DNA SPEC QL NAA+PROBE: NEGATIVE
SPECIMEN DESCRIPTION: NORMAL
SPECIMEN SOURCE CVX/VAG CYTO: NORMAL

## 2018-01-25 RX ORDER — BUPRENORPHINE 10 UG/H
1 PATCH TRANSDERMAL
Qty: 4 PATCH | Refills: 1 | Status: SHIPPED | OUTPATIENT
Start: 2018-01-25 | End: 2018-03-20

## 2018-01-25 NOTE — TELEPHONE ENCOUNTER
Refill request    Medication: buprenorphine (BUTRANS) 10 MCG/HR WK patch  Place 1 patch onto the skin every 7 days          MNPMP Checked:     buprenorphine (BUTRANS) 10 MCG/HR WK patc - Last refilled 12/28/17 for 4 patches       Refilled: YES, dated to be refilled 30 days since last refill    Last clinic appointment:1/8/18  Next clinic appointment:TBD    Patient requested to:      Sent to pharmacy

## 2018-01-29 NOTE — PROGRESS NOTES
Pt sent in a routine remote loop recorder check for evaluation per MD order.  Her loop recorder check shows 1 tachy episode 171 bpm, 36 sec, narrow complex and pt was active at the time.  No symptoms were recorded and pt did not call in symptomatic.  Presenting rhythm is -107 bpm, heart rate histograms are not available and the battery is good.  Pt was called with the results and a message was left for her to call us back with any questions or concerns.  We will plan to see her back in clinic on 3/2/18 when she returns to see Tiara Fletcher NP.    Remote loop recorder

## 2018-02-07 DIAGNOSIS — M96.1 LUMBAR POST-LAMINECTOMY SYNDROME: ICD-10-CM

## 2018-02-07 RX ORDER — TRAMADOL HYDROCHLORIDE 50 MG/1
50 TABLET ORAL EVERY 6 HOURS PRN
Qty: 15 TABLET | Refills: 0 | Status: SHIPPED | OUTPATIENT
Start: 2018-02-07 | End: 2018-02-27

## 2018-02-07 NOTE — TELEPHONE ENCOUNTER
Verbal orders given by Dr. Martinez for tramadol 50mg q6h PRN, dispense quantity 15 tablets.   checked. Prescription called into pt's pharmacy.     Jessica De Souza, RN, BSN

## 2018-02-09 ENCOUNTER — OFFICE VISIT (OUTPATIENT)
Dept: NEUROLOGY | Facility: CLINIC | Age: 48
End: 2018-02-09
Payer: MEDICARE

## 2018-02-09 VITALS
SYSTOLIC BLOOD PRESSURE: 132 MMHG | HEART RATE: 90 BPM | BODY MASS INDEX: 31.33 KG/M2 | WEIGHT: 199.6 LBS | DIASTOLIC BLOOD PRESSURE: 88 MMHG | HEIGHT: 67 IN

## 2018-02-09 DIAGNOSIS — R51.9 HEADACHE DISORDER: Primary | ICD-10-CM

## 2018-02-09 DIAGNOSIS — M54.16 LUMBAR RADICULOPATHY: ICD-10-CM

## 2018-02-09 ASSESSMENT — ENCOUNTER SYMPTOMS
WEIGHT GAIN: 0
NECK PAIN: 1
SWOLLEN GLANDS: 1
ARTHRALGIAS: 1
MUSCLE WEAKNESS: 1
NIGHT SWEATS: 1
HEMOPTYSIS: 0
BACK PAIN: 1
WHEEZING: 0
POOR WOUND HEALING: 1
WEIGHT LOSS: 1
HEARTBURN: 1
FATIGUE: 1
BLOOD IN STOOL: 0
TROUBLE SWALLOWING: 1
DECREASED APPETITE: 1
POLYPHAGIA: 0
HALLUCINATIONS: 0
CHILLS: 1
SEIZURES: 0
COUGH DISTURBING SLEEP: 0
HEMATURIA: 0
LEG PAIN: 1
SLEEP DISTURBANCES DUE TO BREATHING: 0
SMELL DISTURBANCE: 0
EYE REDNESS: 1
BOWEL INCONTINENCE: 0
NAIL CHANGES: 0
SINUS PAIN: 1
VOMITING: 0
EYE IRRITATION: 1
RECTAL PAIN: 0
EXERCISE INTOLERANCE: 1
MYALGIAS: 1
FEVER: 1
DYSURIA: 1
DYSPNEA ON EXERTION: 1
PALPITATIONS: 1
BLOATING: 1
SNORES LOUDLY: 1
ABDOMINAL PAIN: 1
NAUSEA: 1
JOINT SWELLING: 1
FLANK PAIN: 1
TASTE DISTURBANCE: 1
NUMBNESS: 1
BRUISES/BLEEDS EASILY: 1
MEMORY LOSS: 1
DIARRHEA: 1
TINGLING: 1
SINUS CONGESTION: 1
SKIN CHANGES: 0
INCREASED ENERGY: 1
HYPERTENSION: 0
POLYDIPSIA: 1
DIFFICULTY URINATING: 1
SHORTNESS OF BREATH: 1
DIZZINESS: 1
SYNCOPE: 0
EYE PAIN: 0
NECK MASS: 1
DISTURBANCES IN COORDINATION: 1
COUGH: 0
ALTERED TEMPERATURE REGULATION: 1
TREMORS: 1
SPUTUM PRODUCTION: 1
HEADACHES: 1
MUSCLE CRAMPS: 1
HOARSE VOICE: 1
PARALYSIS: 0
LOSS OF CONSCIOUSNESS: 0
STIFFNESS: 1
JAUNDICE: 0
LIGHT-HEADEDNESS: 1
SPEECH CHANGE: 0
HYPOTENSION: 1
CONSTIPATION: 1
WEAKNESS: 1
EYE WATERING: 0
POSTURAL DYSPNEA: 1
ORTHOPNEA: 1
SORE THROAT: 0
DOUBLE VISION: 1

## 2018-02-09 ASSESSMENT — PAIN SCALES - GENERAL: PAINLEVEL: EXTREME PAIN (8)

## 2018-02-09 NOTE — MR AVS SNAPSHOT
After Visit Summary   2/9/2018    Ada Welch    MRN: [id number]           Patient Information     Date Of Birth          1970        Visit Information        Provider Department      2/9/2018 11:00 AM Seth Galeano MD [facility] Neurology        Today's Diagnoses     Headache disorder    -  1    Lumbar radiculopathy           Follow-ups after your visit        Additional Services     NEUROLOGY ADULT REFERRAL       Your provider has referred you for the following:   Consult at Noemy Nguyen MD    Please be aware that coverage of these services is subject to the terms and limitations of your health insurance plan.  Call member services at your health plan with any benefit or coverage questions.      Please bring the following with you to your appointment:    (1) Any X-Rays, CTs or MRIs which have been performed.  Contact the facility where they were done to arrange for  prior to your scheduled appointment.    (2) List of current medications  (3) This referral request   (4) Any documents/labs given to you for this referral            NEUROSURGERY REFERRAL       Your provider has referred you to: [facility]    Please be aware that coverage of these services is subject to the terms and limitations of your health insurance plan.  Call member services at your health plan with any benefit or coverage questions.      Please bring the following with you to your appointment:    (1) Any X-Rays, CTs or MRIs which have been performed.  Contact the facility where they were done to arrange for  prior to your scheduled appointment.   (2) List of current medications  (3) This referral request   (4) Any documents/labs given to you for this referral                  Your next 10 appointments already scheduled     Feb 15, 2018 12:15 PM CST   (Arrive by 12:00 PM)   New Patient Visit with Noemy Nguyen MD   [facility] Neurology ([facility])    [address] [place] [address]    3rd Floor  [facility] [address]   [phone]            Feb 20, 2018 11:30 AM CST   (Arrive by 11:15 AM)   MA SCREENING DIGITAL BILATERAL with URBCMA1   [facility] ([facility])    [address] [place] [address], [address]  [facility] [address] [phone]           Do not use any powder, lotion or deodorant under your arms or on your breast. If you do, we will ask you to remove it before your exam.  Wear comfortable, two-piece clothing.  If you have any allergies, tell your care team.  Bring any previous mammograms from other facilities or have them mailed to the breast center.            Mar 02, 2018 11:30 AM CST   (Arrive by 11:15 AM)   Implantable Loop Recorder with Uc Cv Device 1   [facility] ([facility])    [address] [place] [address]  [address]  [facility] [address]   [phone]            Mar 02, 2018 12:00 PM CST   (Arrive by 11:45 AM)   RETURN ARRHYTHMIA with FIDELINA Mora [facility] ([facility])    [address] [place] [address]  [address]  [facility] [address]   [phone]            Mar 06, 2018  9:30 AM CST   (Arrive by 9:15 AM)   EMG with Jorge Pickard MD   [facility]G ([facility])    [address] [place] [address]   [phone]           Do not use lotions or creams on the area to be tested. If you are on blood thinners (Warfarin, Coumadin, Lovenox, etc), please contact your primary care physician to check if it is safe to stop them 3 days prior to testing. If you have anxiety, please check with your referring physician to obtain anti-anxiety medication for the procedure.            Apr 05, 2018  4:00 PM CDT   (Arrive by 3:45 PM)   Return Visit with Bertha Moncada MD   [facility] Neurosurgery ([facility])    [address] [place] [address] [phone]             Apr 19, 2018 10:55 AM CDT   (Arrive by 10:40 AM)   Return Visit with Ellyn Rivera MD   [facility] ([facility])    [address] [place] [address] [phone]            Jul 11, 2018 11:15 AM CDT   (Arrive by 11:00 AM)   Return Visit with Wade Singh MD   [facility]rology and [facility] ([facility])    [address] [place] [id number] [phone]              Future tests that were ordered for you today     Open Future Orders        Priority Expected Expires Ordered    EMG - Needle EMG 2 Extremities ([address]) Routine  2/9/2019 2/9/2018            Who to contact     Please call your clinic at [phone] to:    Ask questions about your health    Make or cancel appointments    Discuss your medicines    Learn about your test results    Speak to your doctor            Additional Information About Your Visit        [employer] Information     [employer] gives you secure access to your electronic health record. If you see a primary care provider, you can also send messages to your care team and make appointments. If you have questions, please call your primary care clinic.  If you do not have a primary care provider, please call [phone] and they will assist you.      [employer] is an electronic gateway that provides easy, online access to your medical records. With [employer], you can request a clinic appointment, read your test results, renew a prescription or communicate with your care team.     To access your existing account, please contact your [facility] or call [phone] for assistance.        Care EveryWhere ID     This is your Care EveryWhere ID. This could be used by other organizations to access your [place] medical records  [id number]        Your Vitals Were     Pulse Height BMI (Body Mass Index)             90 1.702  "m (5' 7\") 31.26 kg/m2          Blood Pressure from Last 3 Encounters:   02/09/18 132/88   01/08/18 123/84   01/03/18 111/75    Weight from Last 3 Encounters:   02/09/18 90.5 kg (199 lb 9.6 oz)   01/19/18 88.5 kg (195 lb 3.2 oz)   01/03/18 88.5 kg (195 lb)              We Performed the Following     NEUROLOGY ADULT REFERRAL     NEUROSURGERY REFERRAL        Primary Care Provider Office Phone # Fax #    Ellyn Rivera [phone] [address] [address]        Equal Access to Services     [facility] : Ashutosh Slater, herve oakes, analilia brucemadonnell arellano, odette tang. So [facility] [phone].    ATENCIÓN: Si habla español, tiene a sweeney disposición servicios gratuitos de asistencia lingüística. Llame al [phone].    We comply with applicable federal civil rights laws and [place] laws. We do not discriminate on the basis of race, color, national origin, age, disability, sex, sexual orientation, or gender identity.            Thank you!     Thank you for choosing [facility] NEUROLOGY  for your care. Our goal is always to provide you with excellent care. Hearing back from our patients is one way we can continue to improve our services. Please take a few minutes to complete the written survey that you may receive in the mail after your visit with us. Thank you!             Your Updated Medication List - Protect others around you: Learn how to safely use, store and throw away your medicines at www.disposemymeds.org.          This list is accurate as of 2/9/18 12:18 PM.  Always use your most recent med list.                   Brand Name Dispense Instructions for use Diagnosis    ADVAIR DISKUS 250-50 MCG/DOSE diskus inhaler   Generic drug:  fluticasone-salmeterol      Inhale 1 puff into the lungs 2 times daily as needed        albuterol 108 (90 BASE) MCG/ACT Inhaler    PROAIR HFA/PROVENTIL HFA/VENTOLIN HFA     Inhale 2 puffs " into the lungs        amitriptyline 10 MG tablet    ELAVIL    270 tablet    Take 3 tablets (30 mg) by mouth At Bedtime    Chronic migraine without aura without status migrainosus, not intractable       buprenorphine 10 MCG/HR WK patch    BUTRANS    4 patch    Place 1 patch onto the skin every 7 days    Postlaminectomy syndrome of lumbar region       cetirizine 10 MG tablet    zyrTEC     Take 10 mg by mouth daily        COMPRESSION STOCKINGS     3 each    1 each daily 20-30 mmHg Thigh Length measure and fit, color and style per patient preference. Doff n jose per need.    Orthostatic hypotension       eletriptan 20 MG tablet    RELPAX    18 tablet    Take 1 tablet (20 mg) by mouth at onset of headache for migraine May repeat in 2 hours. Max 4 tablets/24 hours.    Chronic daily headache, Intractable migraine without aura and with status migrainosus       EPINEPHrine 0.3 MG/0.3ML injection 2-pack    EPIPEN/ADRENACLICK/or ANY BX GENERIC EQUIV     Inject 0.3 mg into the muscle        famotidine 20 MG tablet    PEPCID    90 tablet    Take 1 tablet (20 mg) by mouth At Bedtime    Hiatal hernia, Mast cell disorder       fluticasone 50 MCG/ACT spray    FLONASE    1 Bottle    Spray 2 sprays into both nostrils daily    Chronic rhinitis, unspecified type       ketamine (KETALAR) 5%-gabapentin (NEURONTIN) 8%-lidocaine 2.5% 5%-8% Gel topical PLO cream     30 g    Apply 1 g topically 3 times daily as needed    Postlaminectomy syndrome of lumbosacral region, Postlaminectomy syndrome of lumbar region       linaclotide 145 MCG capsule    LINZESS    30 capsule    Take 1 capsule (145 mcg) by mouth every morning (before breakfast)    Chronic idiopathic constipation       meloxicam 7.5 MG tablet    MOBIC    60 tablet    Take 1 tablet (7.5 mg) by mouth 2 times daily    Postlaminectomy syndrome       metoclopramide 5 MG tablet    REGLAN    20 tablet    Take 1 tablet (5 mg) by mouth every 6 hours as needed    Intractable chronic migraine  without aura and with status migrainosus       MULTIVITAMIN PO      Take by mouth daily        omeprazole 20 MG CR capsule    priLOSEC    90 capsule    Take 1 capsule (20 mg) by mouth daily    Hiatal hernia       ondansetron 4 MG ODT tab    ZOFRAN-ODT    60 tablet    Take 1-2 tablets (4-8 mg) by mouth every 12 hours as needed for nausea    Migraine without status migrainosus, not intractable, unspecified migraine type       PROBIOTIC DAILY PO      Take by mouth 2 times daily 2 packets daily        SUMAtriptan 6 MG/0.5ML pen injector kit    IMITREX STATDOSE    2 kit    Inject 0.5 mLs (6 mg) Subcutaneous at onset of headache for migraine (may repeat once after 2 hrs) May repeat in 1 hour. Max 12 mg/24 hours.    Chronic migraine without aura without status migrainosus, not intractable       SYNTHROID 25 MCG tablet   Generic drug:  levothyroxine      Take 25 mcg by mouth daily        tiZANidine 4 MG tablet    ZANAFLEX    90 tablet    TAKE 2 TABLETS BY MOUTH 3 TIMES DAILY AS NEEDED FOR MUSCLE SPASMS    Post laminectomy syndrome       traMADol 50 MG tablet    ULTRAM    15 tablet    Take 1 tablet (50 mg) by mouth every 6 hours as needed for pain maximum 4 tablet(s) per day    Lumbar post-laminectomy syndrome       zonisamide 25 MG capsule    Zonegran    360 capsule    Take 125mg daily for 2 weeks, then 150mg    Occipital neuritis

## 2018-02-09 NOTE — TELEPHONE ENCOUNTER
APPT INFO    Date /Time: 2/15/18, 2:15pm   Reason for Appt: Headache   Ref Provider/Clinic: BERNADETTE WILKINS   Are there internal records? Yes/No?  IF YES, list clinic names: [facility] Neurology   Are there outside records? Yes/No? No    Patient Contact (Y/N) & Call Details: No, referred    Action:

## 2018-02-09 NOTE — LETTER
"2018       RE: Ada Welch  [address] [place] [address]     Dear Colleague,    Thank you for referring your patient, Ada Welch, to the [facility] NEUROLOGY at [facility]. Please see a copy of my visit note below.    Service Date: 2018      RE: Ada Welch   MRN: [id number]   : 1970      Dear Dr. Rivera:      This is in regard to followup on Ada Welch.  The patient returned today with chief complaint of chronic headache, neck pain, arm and leg pain as well as low back discomfort.      The patient unfortunately continues to do poorly.  I did try her on zonisamide.  She only tolerated up to the 100 mg dose.  Dosage beyond that caused her to have flank discomfort which she described as \"kidney spasms.\"  Despite that, she wants to continue on zonisamide, as she feels that it has lessened her headaches.  She does continue to drink copious amounts of fluid per day and is aware she needs to do so if she takes zonisamide.  She has had no skin rash and no worsening depression on the medication.  She is on 100 mg dose without side effects.  She did tell me that she did have occipital notch injections done by Dr. Martinez.  She did not tolerate this, and it actually made her pain much worse.  She was told by him that he would not do any further injections.  She went over her prior history of having 4 epidural injections which made her worse and problems with 2 epidural injections during delivery.  She said she had to have then 2 saddle injections.  She found that Butrans skin patch is really the only thing that helps her chronic pain.  She does tell me she \"sparingly\" uses tramadol up to 50 mg 4 times a day.  She continues on amitriptyline 30 mg.  She uses Ketalar cream to the base of her neck and low back.  She is on albuterol.  She uses sumatriptan injections 0.5 mg as a last resort and said she used these up to 8 " "times in 2017.  She feels awful when she uses it.  Otherwise, she does tell me she continues using Relpax 20 mg and estimates she uses 18 per month.  She continues on MOBIC.  Dr. Martinez recently gave her Fioricet.  She said she took 10 during the month of January.  She is aware this could be an addictive drug, too.  She did recall when she was pregnant and had a headache, she took Tylenol and Benadryl.  When her headaches restarted this last summer, she restarted the use of the medication.  She does continue on tizanidine 8 mg up to 3 times per day and on Linzess for constipation.  She did tell me that she continues to suffer with chronic low back pain.  She said her migraines may be better, but she has the pressure type of headaches in the back of her head that she relates to her neck issues and to her Tom-Danlos syndrome.  She went over again with me her consultation with Dr. Moncada and her recommendations.  She has not been back to Dr. Moncada.  She did talk with me about the 2 lumbar surgeries she had including having instrumentation removed on her second surgery here from the first being done in [place].  She said she was actually made worse and she has continued pain that goes down the side of the left leg into the side of her foot.  She notes at times both legs are numb.  She said that any hard surface or prolonged position for seating causes pain, and she did wonder if she \"could ever take another trip again\" because of the discomfort.  She reviewed a recent trip that was attempted with her family which caused her to be basically miserable.  I did go over with her her last MRI scan that was done here.  The patient did have a disk herniation that was seen, actually through the [facility] in [place] on 01/21/2016, and showed that she had a small amount of soft tissue along the left anterolateral aspect of the thecal sac at L4-L5 and L5-S1 levels.  There was mild impingement of the exiting left L5 nerve " root and the L5-S1 neural foramina.  She also had postoperative changes.  She did evidently briefly discuss her previous surgery with Dr. Moncada but has not been back regarding her leg complaints.  She did have a number of questions about the EMG study that Dr. Pickard performed on her regarding her neck and arm symptoms.  She had questions about why her hands do not work well, and I explained to her I really could not come up with an answer but suspect that some of it related to Tom-Danlos syndrome and some could relate to cervical radicular problems, although her EMG was basically normal in her arms but did show some abnormality involving the paraspinal muscles on one side.  The patient has not had an EMG of her legs here.  She did tell me she would be willing to consider that and would go back to Dr. Moncada regarding suggestions regarding her chronic spine issues.  She did tell me the use of a TENS unit, sometimes has made her neck pain worse.  She has not looked into a Cefaly yet, and I did talk with her about purchasing this machine.  She has been told that she should not have Botox injections involving her neck, but although there could be a contraindication with Tom-Danlos syndrome, possibly it could be helpful involving facial muscular structures.  I have asked that she consider referral to Dr. Noemy Nguyen MD, our headache specialist who does do Botox through our department.      PHYSICAL EXAMINATION:  Neurologic examination revealed a tearful woman who did brighten and smiled at the end of the examination.  Otherwise, gait, station, cerebellar testing, muscle stretch reflexes that showed a slightly reduced left biceps reflex which was normal with reinforcement and markedly reduced left internal hamstring reflex with absent ankle jerks, plantar stimulation, strength testing with encouragement, as well as cranial nerve examination are unremarkable.  She had decreased sensation to pinprick and light  touch involving the dorsum of her foot to the side of her leg below the knee.  The patient did have some paraspinal muscle spasm involving the lumbar paraspinal muscles, but her neck actually was quite supple today.      IMPRESSION:   1.  Chronic pain syndrome.   2.  Chronic mixed headache disorder.   3.  Chronic neck and arm pain.   4.  Chronic low back pain with radiculopathy.      The patient is going to go ahead and have an emg of the legs.  I have recommended that she go back to Dr. Moncada concerning her chronic spine complaints.  I have talked with her about seeing Dr. Nguyen.  I explained to her I really could not think of anything else to offer.  The patient did spend 40 minutes with me today, over 50% of the time of this involved counseling and coordination of care.  I reviewed with her again analgesic rebound.  I did talk with her about purchasing a Cefaly unit.      Thank you again for allowing me to see this patient.           BERNADETTE GALEANO MD             D: 02/10/2018   T: 2018   MT: SUE      Name:     PANCHO HENAO   MRN:      [id number]        Account:      [id number]   :      1970           Service Date: 2018      Document: [id number]        Again, thank you for allowing me to participate in the care of your patient.      Sincerely,    Bernadette Galeano MD    CC:  Ellyn Rivera MD    [address]  [address]

## 2018-02-12 NOTE — PROGRESS NOTES
"Service Date: 2018      Ellyn Rivera MD    [facility]   [address] [place] [address]       RE: Ada Welch   MRN: [id number]   : 1970      Dear Dr. Rivera:      This is in regard to followup on Ada Welch.  The patient returned today with chief complaint of chronic headache, neck pain, arm and leg pain as well as low back discomfort.      The patient unfortunately continues to do poorly.  I did try her on zonisamide.  She only tolerated up to the 100 mg dose.  Dosage beyond that caused her to have flank discomfort which she described as \"kidney spasms.\"  Despite that, she wants to continue on zonisamide, as she feels that it has lessened her headaches.  She does continue to drink copious amounts of fluid per day and is aware she needs to do so if she takes zonisamide.  She has had no skin rash and no worsening depression on the medication.  She is on 100 mg dose without side effects.  She did tell me that she did have occipital notch injections done by Dr. Martinez.  She did not tolerate this, and it actually made her pain much worse.  She was told by him that he would not do any further injections.  She went over her prior history of having 4 epidural injections which made her worse and problems with 2 epidural injections during delivery.  She said she had to have then 2 saddle injections.  She found that Butrans skin patch is really the only thing that helps her chronic pain.  She does tell me she \"sparingly\" uses tramadol up to 50 mg 4 times a day.  She continues on amitriptyline 30 mg.  She uses Ketalar cream to the base of her neck and low back.  She is on albuterol.  She uses sumatriptan injections 0.5 mg as a last resort and said she used these up to 8 times in 2017.  She feels awful when she uses it.  Otherwise, she does tell me she continues using Relpax 20 mg and estimates she uses 18 per month.  She continues on MOBIC.  Dr. Martniez " "recently gave her Fioricet.  She said she took 10 during the month of January.  She is aware this could be an addictive drug, too.  She did recall when she was pregnant and had a headache, she took Tylenol and Benadryl.  When her headaches restarted this last summer, she restarted the use of the medication.  She does continue on tizanidine 8 mg up to 3 times per day and on Linzess for constipation.  She did tell me that she continues to suffer with chronic low back pain.  She said her migraines may be better, but she has the pressure type of headaches in the back of her head that she relates to her neck issues and to her Tom-Danlos syndrome.  She went over again with me her consultation with Dr. Moncada and her recommendations.  She has not been back to Dr. Moncada.  She did talk with me about the 2 lumbar surgeries she had including having instrumentation removed on her second surgery here from the first being done in [place].  She said she was actually made worse and she has continued pain that goes down the side of the left leg into the side of her foot.  She notes at times both legs are numb.  She said that any hard surface or prolonged position for seating causes pain, and she did wonder if she \"could ever take another trip again\" because of the discomfort.  She reviewed a recent trip that was attempted with her family which caused her to be basically miserable.  I did go over with her her last MRI scan that was done here.  The patient did have a disk herniation that was seen, actually through the [facility] in [place] on 01/21/2016, and showed that she had a small amount of soft tissue along the left anterolateral aspect of the thecal sac at L4-L5 and L5-S1 levels.  There was mild impingement of the exiting left L5 nerve root and the L5-S1 neural foramina.  She also had postoperative changes.  She did evidently briefly discuss her previous surgery with Dr. Moncada but has not been back regarding her leg " complaints.  She did have a number of questions about the EMG study that Dr. Pickard performed on her regarding her neck and arm symptoms.  She had questions about why her hands do not work well, and I explained to her I really could not come up with an answer but suspect that some of it related to Tom-Danlos syndrome and some could relate to cervical radicular problems, although her EMG was basically normal in her arms but did show some abnormality involving the paraspinal muscles on one side.  The patient has not had an EMG of her legs here.  She did tell me she would be willing to consider that and would go back to Dr. Moncada regarding suggestions regarding her chronic spine issues.  She did tell me the use of a TENS unit, sometimes has made her neck pain worse.  She has not looked into a Cefaly yet, and I did talk with her about purchasing this machine.  She has been told that she should not have Botox injections involving her neck, but although there could be a contraindication with Tom-Danlos syndrome, possibly it could be helpful involving facial muscular structures.  I have asked that she consider referral to Dr. Noemy Nguyen MD, our headache specialist who does do Botox through our department.      PHYSICAL EXAMINATION:  Neurologic examination revealed a tearful woman who did brighten and smiled at the end of the examination.  Otherwise, gait, station, cerebellar testing, muscle stretch reflexes that showed a slightly reduced left biceps reflex which was normal with reinforcement and markedly reduced left internal hamstring reflex with absent ankle jerks, plantar stimulation, strength testing with encouragement, as well as cranial nerve examination are unremarkable.  She had decreased sensation to pinprick and light touch involving the dorsum of her foot to the side of her leg below the knee.  The patient did have some paraspinal muscle spasm involving the lumbar paraspinal muscles, but her neck  actually was quite supple today.      IMPRESSION:   1.  Chronic pain syndrome.   2.  Chronic mixed headache disorder.   3.  Chronic neck and arm pain.   4.  Chronic low back pain with radiculopathy.      The patient is going to go ahead and have an emg of the legs.  I have recommended that she go back to Dr. Moncada concerning her chronic spine complaints.  I have talked with her about seeing Dr. Nguyen.  I explained to her I really could not think of anything else to offer.  The patient did spend 40 minutes with me today, over 50% of the time of this involved counseling and coordination of care.  I reviewed with her again analgesic rebound.  I did talk with her about purchasing a Cefaly unit.      Thank you again for allowing me to see this patient.      Sincerely yours,            MD BERNADETTE Spence MD             D: 02/10/2018   T: 2018   MT: SUE      Name:     PANCHO HENAO   MRN:      [id number]        Account:      [id number]   :      1970           Service Date: 2018      Document: [id number]

## 2018-02-15 ENCOUNTER — PRE VISIT (OUTPATIENT)
Dept: NEUROLOGY | Facility: CLINIC | Age: 48
End: 2018-02-15

## 2018-02-27 DIAGNOSIS — M96.1 LUMBAR POST-LAMINECTOMY SYNDROME: ICD-10-CM

## 2018-02-27 RX ORDER — TRAMADOL HYDROCHLORIDE 50 MG/1
50 TABLET ORAL EVERY 6 HOURS PRN
Qty: 15 TABLET | Refills: 0 | Status: SHIPPED | OUTPATIENT
Start: 2018-02-27 | End: 2018-03-07

## 2018-02-27 NOTE — TELEPHONE ENCOUNTER
Verbal orders given by Dr. Martinez to refill pt's tramadol prescription for 15 tablets.  Prescription called into pt's pharmacy.     Jessica De Souza, RN, BSN

## 2018-03-01 ENCOUNTER — PRE VISIT (OUTPATIENT)
Dept: CARDIOLOGY | Facility: CLINIC | Age: 48
End: 2018-03-01

## 2018-03-01 LAB
CATECHOLS UR-IMP: NORMAL
COLLECT DURATION TIME UR: 24 HR
CREAT 24H UR-MRATE: 874 MG/D (ref 700–1600)
CREAT UR-MCNC: 84 MG/DL
DOPAMINE 24H UR-MRATE: 185 UG/D (ref 77–324)
DOPAMINE UR-MCNC: 2 UG/L
DOPAMINE/CREAT UR: 212 UG/G CRT (ref 0–250)
EPINEPH 24H UR-MRATE: 2 UG/D (ref 1–7)
EPINEPH UR-MCNC: 178 UG/L
EPINEPH/CREAT UR: 2 UG/G CRT (ref 0–20)
NOREPINEPH 24H UR-MRATE: 21 UG/D (ref 16–71)
NOREPINEPH UR-MCNC: 20 UG/L
NOREPINEPH/CREAT UR: 24 UG/G CRT (ref 0–45)
SPECIMEN VOL ?TM UR: 1040 ML

## 2018-03-02 ENCOUNTER — ALLIED HEALTH/NURSE VISIT (OUTPATIENT)
Dept: CARDIOLOGY | Facility: CLINIC | Age: 48
End: 2018-03-02
Attending: NURSE PRACTITIONER
Payer: COMMERCIAL

## 2018-03-02 ENCOUNTER — MYC REFILL (OUTPATIENT)
Dept: GASTROENTEROLOGY | Facility: CLINIC | Age: 48
End: 2018-03-02

## 2018-03-02 VITALS
HEART RATE: 112 BPM | WEIGHT: 196 LBS | DIASTOLIC BLOOD PRESSURE: 84 MMHG | HEIGHT: 67 IN | BODY MASS INDEX: 30.76 KG/M2 | OXYGEN SATURATION: 96 % | SYSTOLIC BLOOD PRESSURE: 124 MMHG

## 2018-03-02 DIAGNOSIS — G90.9 AUTONOMIC DYSFUNCTION: Primary | ICD-10-CM

## 2018-03-02 DIAGNOSIS — R00.0 TACHYCARDIA: Primary | ICD-10-CM

## 2018-03-02 DIAGNOSIS — I95.1 ORTHOSTATIC HYPOTENSION: ICD-10-CM

## 2018-03-02 DIAGNOSIS — K59.04 CHRONIC IDIOPATHIC CONSTIPATION: ICD-10-CM

## 2018-03-02 DIAGNOSIS — G90.9 AUTONOMIC DYSFUNCTION: ICD-10-CM

## 2018-03-02 LAB
ALBUMIN SERPL-MCNC: 4.4 G/DL (ref 3.4–5)
ALP SERPL-CCNC: 132 U/L (ref 40–150)
ALT SERPL W P-5'-P-CCNC: 38 U/L (ref 0–50)
ANION GAP SERPL CALCULATED.3IONS-SCNC: 9 MMOL/L (ref 3–14)
AST SERPL W P-5'-P-CCNC: 35 U/L (ref 0–45)
BILIRUB SERPL-MCNC: 1.2 MG/DL (ref 0.2–1.3)
BUN SERPL-MCNC: 9 MG/DL (ref 7–30)
CALCIUM SERPL-MCNC: 8.9 MG/DL (ref 8.5–10.1)
CHLORIDE SERPL-SCNC: 105 MMOL/L (ref 94–109)
CO2 SERPL-SCNC: 24 MMOL/L (ref 20–32)
CREAT SERPL-MCNC: 0.88 MG/DL (ref 0.52–1.04)
GFR SERPL CREATININE-BSD FRML MDRD: 69 ML/MIN/1.7M2
GLUCOSE SERPL-MCNC: 89 MG/DL (ref 70–99)
POTASSIUM SERPL-SCNC: 3.8 MMOL/L (ref 3.4–5.3)
PROT SERPL-MCNC: 7.8 G/DL (ref 6.8–8.8)
SODIUM SERPL-SCNC: 138 MMOL/L (ref 133–144)

## 2018-03-02 PROCEDURE — 93005 ELECTROCARDIOGRAM TRACING: CPT | Mod: ZF

## 2018-03-02 PROCEDURE — 93291 INTERROG DEV EVAL SCRMS IP: CPT | Mod: 26 | Performed by: INTERNAL MEDICINE

## 2018-03-02 PROCEDURE — 99213 OFFICE O/P EST LOW 20 MIN: CPT | Mod: 25 | Performed by: NURSE PRACTITIONER

## 2018-03-02 PROCEDURE — G0463 HOSPITAL OUTPT CLINIC VISIT: HCPCS | Mod: 25,ZF

## 2018-03-02 PROCEDURE — 93010 ELECTROCARDIOGRAM REPORT: CPT | Mod: ZP | Performed by: INTERNAL MEDICINE

## 2018-03-02 PROCEDURE — 93291 INTERROG DEV EVAL SCRMS IP: CPT | Mod: ZF

## 2018-03-02 RX ORDER — MIDODRINE HYDROCHLORIDE 5 MG/1
5 TABLET ORAL 2 TIMES DAILY
Qty: 180 TABLET | Refills: 3 | Status: SHIPPED | OUTPATIENT
Start: 2018-03-02 | End: 2018-05-24

## 2018-03-02 ASSESSMENT — ENCOUNTER SYMPTOMS
ARTHRALGIAS: 1
EYE IRRITATION: 1
TINGLING: 1
PALPITATIONS: 1
POSTURAL DYSPNEA: 1
DECREASED LIBIDO: 0
SINUS CONGESTION: 1
SHORTNESS OF BREATH: 1
DECREASED APPETITE: 1
HOARSE VOICE: 1
HEADACHES: 1
NECK PAIN: 1
TROUBLE SWALLOWING: 1
LIGHT-HEADEDNESS: 1
ABDOMINAL PAIN: 1
SPEECH CHANGE: 0
INCREASED ENERGY: 1
SLEEP DISTURBANCES DUE TO BREATHING: 1
LOSS OF CONSCIOUSNESS: 1
DIARRHEA: 1
EXERCISE INTOLERANCE: 1
MUSCLE WEAKNESS: 1
DIZZINESS: 1
FEVER: 1
POLYPHAGIA: 0
DYSPNEA ON EXERTION: 1
SNORES LOUDLY: 1
VOMITING: 0
BLOOD IN STOOL: 0
HYPOTENSION: 1
NECK MASS: 1
NUMBNESS: 1
SWOLLEN GLANDS: 1
MYALGIAS: 1
BACK PAIN: 1
HEMOPTYSIS: 0
SORE THROAT: 1
HEMATURIA: 0
SINUS PAIN: 1
HALLUCINATIONS: 0
DYSURIA: 1
JOINT SWELLING: 1
MEMORY LOSS: 1
TASTE DISTURBANCE: 1
FLANK PAIN: 1
POLYDIPSIA: 1
NAUSEA: 1
STIFFNESS: 1
RECTAL PAIN: 0
DISTURBANCES IN COORDINATION: 1
SEIZURES: 0
SMELL DISTURBANCE: 1
CONSTIPATION: 1
NAIL CHANGES: 1
ORTHOPNEA: 1
WEIGHT GAIN: 1
WEAKNESS: 1
COUGH: 1
JAUNDICE: 0
ALTERED TEMPERATURE REGULATION: 1
CHILLS: 1
WEIGHT LOSS: 1
EYE REDNESS: 1
HOT FLASHES: 0
FATIGUE: 1
DOUBLE VISION: 1
BRUISES/BLEEDS EASILY: 1
COUGH DISTURBING SLEEP: 1
SKIN CHANGES: 1
NIGHT SWEATS: 1
HEARTBURN: 1
DIFFICULTY URINATING: 1
MUSCLE CRAMPS: 1
SPUTUM PRODUCTION: 1
BOWEL INCONTINENCE: 0
POOR WOUND HEALING: 1
BLOATING: 1
TREMORS: 1
SYNCOPE: 0
HYPERTENSION: 0
EYE WATERING: 1
WHEEZING: 1
PARALYSIS: 0
EYE PAIN: 1
LEG PAIN: 1

## 2018-03-02 ASSESSMENT — PAIN SCALES - GENERAL: PAINLEVEL: MODERATE PAIN (5)

## 2018-03-02 NOTE — PATIENT INSTRUCTIONS
It was a pleasure to see you in clinic today. Please do not hesitate to call with any questions or concerns. You are scheduled for a remote ILR transmission on 6/4/18. We will call in 1-2 business days to discuss the results with you. We look forward to seeing you in clinic at your next device check in 6 months.    Carla Hou, RN  Electrophysiology Nurse Clinician  [facility]  During business hours call:  [phone]  After business hours please call: [phone]- select option #4 and ask for job code [id number].

## 2018-03-02 NOTE — LETTER
3/2/2018      RE: Ada Welch  [address] [place] [address]       Dear Colleague,    Thank you for the opportunity to participate in the care of your patient, Ada Welch, at the [facility] at [facility]. Please see a copy of my visit note below.      Clinical Cardiac Electrophysiology    HPI: Ms. Scar Welch is a 47 year-old female who presents with her daughter for follow up of autonomic dysfuction.  The patient has a past medical history significant for dysautonomia, Tom-Danlos, Chiari malformation, eosinophilic esophagitis, and gastroparesis. Since 2014, she notes that her heart rates have fluctuated from as low as 40 to as high as 150 bpm and her BP would also fluctuate at the same time without known aggravating factors. Fluctuations of HR happen when she is supine, sitting, standing, walking, etc and symptoms associated with the fluctuations include LH and dizziness. She had two episodes of blacking out for a few seconds with a prodrome of LH, dizziness, nausea, diaphoresis, black spots in vision before the episodes and was noted to be pale by her . She saw Dr. Tello with these complaints and patient had a ILR placed 8/2017. An echocardiogram was ordered but never completed.  ILR showed poor R wave sensing but did show tachycardia with rates in the 150's. In addition, she underwent tilt table testing at [facility]; however, the results were unclear. Patient has several signs of autonomic dysfunction including HR elevation with positional changes found today in clinic and HR elevation to the 170's on ILR. However, there are some atypical features such as HR elevation at home in any position (supine or standing). Patient has several signs of autonomic dysfunction including HR elevation with positional changes found with orthostats in clinic 10/5/2017 and HR elevation to the 170's on ILR. However, there are some atypical features such as HR  elevation at home in any position (supine or standing). Given poor sensing of ILR, a Cardionet was done 10/5/2017-10/14/2017 and showed sinus rhythm and frequent sinus tachycardia correlating with symptoms of dyspnea and racing heart.  A repeat tilt table was done 10/20/2017 and was normal. Of note, patient has a history of allergic reactions. She states she often has skin rashes that sometimes improve with anti-histamines so to address the possibility of mast cell activation the following tests were done 10/2017: ESR (normal 8), CRP(normal <2.9), urine histamine(elevated at 1273, ref range 0-450), Urine catecholamines(normal), prostaglandins(normal).     She states that she has 3-4 episodes of presyncope a week without any aggravating factors noted. Patient states that never misses doses of medication. Patient denies tobacco use, quit smoking 3 years ago. Drinking 100 ounces water daily, not drinking Pedialyte or propel.  Denies drinking alcohol.  Drinks 4 cans soda caffeinated beverages daily. States that activity level is light, limited by neck and back pain.  She has chest wall pain a few times a week, lasts for a couple of minutes to half an hour, resolves with change in position, muscle relaxer, or water, can happen during rest or when up and moving.  States that she has pedal edema if she doesn't wear compression stockings. Denies syncope, angina, dyspnea at rest or with exertion, dry cough, palpitations, orthopnea, PND, abdominal pain, abdominal edema, or claudication.     Orthostatic BP: supine 126/90 HR 94, sitting 127/87 , standing 120/79 , standing 1 minute 128/89 , standing 3 minute 124/84   Presenting EKG: NSR, Vent rate 90, NE interval 152 ms, QRS duration 88 ms, QTc 455 ms.  Current Outpatient Prescriptions   Medication Sig Dispense Refill     traMADol (ULTRAM) 50 MG tablet Take 1 tablet (50 mg) by mouth every 6 hours as needed for pain maximum 4 tablet(s) per day 15 tablet 0      buprenorphine (BUTRANS) 10 MCG/HR WK patch Place 1 patch onto the skin every 7 days 4 patch 1     amitriptyline (ELAVIL) 10 MG tablet Take 3 tablets (30 mg) by mouth At Bedtime 270 tablet 3     meloxicam (MOBIC) 7.5 MG tablet Take 1 tablet (7.5 mg) by mouth 2 times daily 60 tablet 1     fluticasone (FLONASE) 50 MCG/ACT spray Spray 2 sprays into both nostrils daily 1 Bottle 3     tiZANidine (ZANAFLEX) 4 MG tablet TAKE 2 TABLETS BY MOUTH 3 TIMES DAILY AS NEEDED FOR MUSCLE SPASMS 90 tablet 3     zonisamide (ZONEGRAN) 25 MG capsule Take 125mg daily for 2 weeks, then 150mg 360 capsule 3     linaclotide (LINZESS) 145 MCG capsule Take 1 capsule (145 mcg) by mouth every morning (before breakfast) 30 capsule 3     eletriptan (RELPAX) 20 MG tablet Take 1 tablet (20 mg) by mouth at onset of headache for migraine May repeat in 2 hours. Max 4 tablets/24 hours. 18 tablet 1     levothyroxine (SYNTHROID) 25 MCG tablet Take 25 mcg by mouth daily       famotidine (PEPCID) 20 MG tablet Take 1 tablet (20 mg) by mouth At Bedtime 90 tablet 1     omeprazole (PRILOSEC) 20 MG CR capsule Take 1 capsule (20 mg) by mouth daily 90 capsule 1     ondansetron (ZOFRAN-ODT) 4 MG ODT tab Take 1-2 tablets (4-8 mg) by mouth every 12 hours as needed for nausea 60 tablet 1     metoclopramide (REGLAN) 5 MG tablet Take 1 tablet (5 mg) by mouth every 6 hours as needed 20 tablet 1     EPINEPHrine (EPIPEN/ADRENACLICK/OR ANY BX GENERIC EQUIV) 0.3 MG/0.3ML injection 2-pack Inject 0.3 mg into the muscle       albuterol (PROAIR HFA/PROVENTIL HFA/VENTOLIN HFA) 108 (90 BASE) MCG/ACT Inhaler Inhale 2 puffs into the lungs       COMPRESSION STOCKINGS 1 each daily 20-30 mmHg Thigh Length measure and fit, color and style per patient preference. Doff n jose per need. 3 each 3     SUMAtriptan (IMITREX STATDOSE) 6 MG/0.5ML pen injector kit Inject 0.5 mLs (6 mg) Subcutaneous at onset of headache for migraine (may repeat once after 2 hrs) May repeat in 1 hour. Max 12  mg/24 hours. 2 kit 1     ketamine, KETALAR, 5%-gabapentin, NEURONTIN, 8%-lidocaine 2.5% 5%-8% GEL topical PLO cream Apply 1 g topically 3 times daily as needed 30 g 3     fluticasone-salmeterol (ADVAIR DISKUS) 250-50 MCG/DOSE diskus inhaler Inhale 1 puff into the lungs 2 times daily as needed        cetirizine (ZYRTEC) 10 MG tablet Take 10 mg by mouth daily        Multiple Vitamins-Minerals (MULTIVITAMIN PO) Take by mouth daily       Probiotic Product (PROBIOTIC DAILY PO) Take by mouth 2 times daily 2 packets daily         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     Chronic sinusitis 00/2007    Diagnosed with chronic pansinusitis 2016     Chronic tonsillitis 1980    Had tonsils removed 02/1981, rheumatic fever     Tom-Danlos disease      Gastroesophageal reflux disease 3/1/2017    I have large hiatal hernia     Hernia, abdominal 10/2016    Hiatal Hernia     Hiatal hernia 2016    Have adeline hiatel hernia     Hoarseness Unsure    It comes and goes     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     Migraines 1/1/2007    Unsure of exact date     Nasal polyps 2013    Were revoved 03/2017, benign     Pneumonia Unsure    Have had pneumonia several times     Swelling, mass, or lump in head and neck 08/2016    Lymph node has been growing for last year     Thyroid disease 01/01/2008       Past Surgical History:   Procedure Laterality Date     ADENOIDECTOMY  1981     AS REPAIR OF NASAL SEPTUM       NASAL/SINUS POLYPECTOMY  2017    Polyps were benign     TONSILLECTOMY       TONSILLECTOMY  1981     TUBAL LIGATION         Family History   Problem Relation Age of Onset     Connective Tissue Disorder Mother      eds     Connective Tissue  Disorder Sister      eds     CANCER Father      Spinal tumor grew into spinal cord, went in brain     Migraines Father      DIABETES Maternal Grandmother      Elderly diabetes     HEART DISEASE Maternal Grandmother      Hypertension Maternal Grandmother      DIABETES Paternal Grandmother      Elderly diabetes     DIABETES Paternal Grandfather      Elderly diabetes     Asthma Sister      Pediatric Asthma     Migraines Sister      Asthma Son      Pediatric Asthma     Thyroid Disease Sister      ,     Migraines Sister      Breast Cancer No family hx of        Social History   Substance Use Topics     Smoking status: Former Smoker     Packs/day: 0.20     Years: 10.00     Types: Cigarettes     Start date: 1/1/1991     Quit date: 12/1/2013     Smokeless tobacco: Never Used      Comment: I smoked on and off during specified dates.     Alcohol use Yes      Comment: couple per year       Allergies   Allergen Reactions     Bee Venom Anaphylaxis, Difficulty breathing, Palpitations, Shortness Of Breath and Visual Disturbance     Patient does carry Epi-Pen     No Clinical Screening - See Comments Anaphylaxis     Chicken-Derived Products (Egg) Diarrhea and Nausea     Patient will self monitor  Other reaction(s): Nausea  Patient will self monitor  Other reaction(s): GI intolerance     Gabapentin      Gluten Meal      Lactose Dermatitis and Diarrhea     Milk Digestant Nausea     Patient is aware of milk sensitivity, will manage own diet     Milk Protein Extract      Other reaction(s): GI intolerance\  EGGS      Topiramate      Wheat Diarrhea     Wheat Bran Nausea     Patient will self monitor  Other reaction(s): Nausea  Patient will self monitor     ECHOCARDIOGRAM 2016:  Location: [facility]  Room:  Status:OutPatient  Study time: 8 42    Allergies: NO KNOWN ALLRGY, NO LATEX ALGY    Reading Physician:  ALEX LOPEZ MD  Referring Physician:  ALEX LOPEZ MD  Adult Cardiac Sonographer:  SONY SOLIS  [facility]    Indications: Tachycardia.    Procedure: The transthoracic approach was used. The study included complete 2D  imaging, M-mode, complete spectral Doppler, and color Doppler.    Summary:  Left ventricle: Size was normal. Systolic function was normal by visual  assessment. Ejection fraction was estimated to be 55 %. There were no regional  wall motion abnormalities. Wall thickness was normal. Doppler parameters were  consistent with abnormal left ventricular relaxation (mild diastolic  dysfunction).  Aortic valve, LVOT: The valve was tricuspid. Aortic cusps demonstrated normal in  thickness and normal cuspal separation. There was no stenosis. There was no  regurgitation.  Mitral valve: Valve structure was normal. There was no evidence for stenosis.  There was no regurgitation.  Left atrium: Size was normal.  Right ventricle: The size was normal. Systolic function was normal. Wall  thickness was normal.  Tricuspid valve: There was no significant regurgitation.  Right atrium: Size was normal.  Pericardium, pleura: There was no pericardial effusion.    Comparisons:  There has been no significant interval change. Comparison was made with the  previous study of 05-Aug-2008.    Left ventricle: Size was normal. Systolic function was normal by visual  assessment. Ejection fraction was estimated to be 55 %. There were no regional  wall motion abnormalities. Wall thickness was normal. Doppler: Doppler  parameters were consistent with abnormal left ventricular relaxation (mild  diastolic dysfunction).    Aortic valve: The valve was tricuspid. Aortic cusps demonstrated normal in  thickness and normal cuspal separation. Doppler: Transaortic velocity was  within the normal range. There was no stenosis. There was no regurgitation.    Aorta: The root exhibited normal size. The ascending aorta was normal in size.    Mitral valve: Valve structure was normal. There was normal leaflet separation.  Doppler: The transmitral velocity  was within the normal range. There was no  evidence for stenosis. There was no regurgitation.    Left atrium: Size was normal.    Atrial septum: There was no shunting across the atrial septum.    Right ventricle: The size was normal. Systolic function was normal. Wall  thickness was normal.    Pulmonic valve: The valve structure was normal. Doppler: There was no  significant regurgitation.    Tricuspid valve: Doppler: There was no significant regurgitation.    Right atrium: Size was normal.    Systemic veins: IVC: The inferior vena cava was normal in size and course.  Respirophasic changes were normal.    Pericardium: There was no pericardial effusion. The pericardium was normal in  appearance.    Measurement tables    2D measurements  Left ventricle   (Reference normals)  LVID ed, PLAX   39 mm   (35-60)  LVID es, PLAX   23 mm   (21-40)  FS, PLAX   41.03 %   (25-46)  LVPW thickness ed   11 mm   (6-11)  Ratio, IVS/LVPW   0.91    (<1.3)  Ventricular septum   (Reference normals)  IVS thickness ed   10 mm   (6-11)  LVOT   (Reference normals)  Diam   24 mm   (--)  Left atrium   (Reference normals)  Vol index    13.5 cc/m squared   (11-38)    M-mode measurements  Aorta   (Reference normals)  Root diam ed   37 mm   (20-37)    Doppler measurements  LVOT   (Reference normals)  Peak britni   83 cm/s   (--)  VTI   14.6 cm   (--)  Stroke vol   66.05 ml   (--)  Aortic valve   (Reference normals)  Peak britni   103 cm/s   (--)  VTI   19.33 cm   (--)  DSI, VTI   0.76    (--)  Valve area, VTI   3.44 cm squared   (--)  Obstr index, Vmax   0.81    (--)  Valve area, Vmax   3.66 cm squared   (--)  Mitral valve   (Reference normals)  Peak E britni   57.6 cm/s   (--)  Peak A britni   67.2 cm/s   (--)  Peak E/A ratio   0.86    (--)    Prepared and electronically signed by    ALEX LOPEZ MD  Signed    ROS:   Negative except for as indicated in HPI.    Physical Examination:  Vitals: /84 (BP Location: Left arm, Patient Position: Standing,  "Cuff Size: Adult Regular)  Pulse 112  Ht 1.702 m (5' 7\")  Wt 88.9 kg (196 lb)  SpO2 96%  BMI 30.7 kg/m2  BMI= Body mass index is 30.7 kg/(m^2).    GENERAL APPEARANCE: healthy, alert, and no acute distress  HEENT: no icterus, no xanthelasmas, normal pupil size and reaction, no cyanosis.  NECK: no asymmetry, no JVD   CHEST: lungs clear to auscultation - no rales, rhonchi or wheezes, no use of accessory muscles, no retractions, respirations are unlabored, normal respiratory rate  CARDIOVASCULAR: regular rhythm, normal S1 with physiologic split S2, no S3 or S4 and no murmur, click or rub, precordium quiet with normal PMI.  ABDOMEN: soft, non-tender, without hepatosplenomegaly, no masses palpable, bowel sounds normal, aorta not enlarged by palpation, no abdominal bruits  EXTREMITIES: no clubbing, cyanosis, or edema  NEURO: alert and oriented to person/place/time, normal speech, gait and affect. Wearing a C-spine collar.  VASC: Radial  and posterior tibialis pulses +2 and symmetric bilaterally.   SKIN: no ecchymoses, no rashes      Assessment and recommendations:    1. AUTONOMIC DYSFUNCTION:    1. Try to drink 50-60 ounces of 50/50 electrolyte fluids/water mix per day.    Examples: Propel or Pedialyte. Gatorade and Powerade are higher in sugar/calories and are okay in moderation or if you are an athlete.    2.  Avoid salt tablets. Use salt liberally on foods.    3. Take medications as prescribed.  Midodrine: Keep the am dose of midodrine at the bedside along with 20 oz of fluid (Propel/water), as long as the medication can be kept safely away from children or pets. The am dose should be taken with 20 oz of fluid 15-20 minutes before rising. The dose of midodrine can be repeat at noon as long as patient will be up and out of bed. Midodrine should not be taken within 5 hours of going to bed. Midodrine should not be taken if the patient will be staying in bed. Midodrine can cause severe hypertension if the patient " remains supine. Other effects include shivering, 'goose bumps' and occasionally difficulty urinating or increase frequency of urnination. Midodrine should not be taken with ergot derivatives typically given for migrane headaches. Use of psuedoephedrine and ephedrine should be avoid while taking midodrine.  Will get a CMP today as initiating midodrine.    4. Isometric exercises.  These exercises will assist in increasing intravascular pressure.  They also include swimming, rowing, recumbent bike.    5. Try to eat six small meals per day. Try to keep these meals low in carbohydrates and low in gluten.     6. Bike compression shorts or Spanx- use as much as possible, especially when standing for longer periods of time.     7.  EKG done today.    8.  Complete echocardiogram ordered by Dr. Tello to evaluate function of the heart.    9. Her work up for mast cell activation was normal except for  urine histamine (elevated at 1273, ref range 0-450). As this was an outlier but was very elevated, will repeat the test.  If positive and since she is already currently taking cetirizine, will consider adding ranitidine as a treatment option.     FOLLOW UP:  Follow up in three months or follow up sooner as needed for symptoms.      Patient expresses understanding and agreement with the plan.    I appreciate the chance to help with Ada hernandez. Please let me know if you have any questions or concerns.    Tiara KITCHEN, NP-C

## 2018-03-02 NOTE — MR AVS SNAPSHOT
After Visit Summary   3/2/2018    Ada Welch    MRN: [id number]           Patient Information     Date Of Birth          1970        Visit Information        Provider Department      3/2/2018 11:30 AM 1, Milind Cv Device [facility]        Today's Diagnoses     Tachycardia    -  1      Care Instructions    It was a pleasure to see you in clinic today. Please do not hesitate to call with any questions or concerns. You are scheduled for a remote ILR transmission on 6/4/18. We will call in 1-2 business days to discuss the results with you. We look forward to seeing you in clinic at your next device check in 6 months.    Carla Hou, RN  Electrophysiology Nurse Clinician  [facility]  During business hours call:  [phone]  After business hours please call: [phone]- select option #4 and ask for job code [phone].            Follow-ups after your visit        Additional Services     Follow-Up with Device Clinic-6 months                 Your next 10 appointments already scheduled     Mar 02, 2018 12:00 PM CST   (Arrive by 11:45 AM)   RETURN ARRHYTHMIA with FIDELINA Mora [facility] ([facility])    [address]  [address]  [facility] [address] [phone]            Mar 07, 2018  8:40 AM CST   (Arrive by 8:25 AM)   Return Visit with Faiza Martinez MD   [facility] ([facility])    [address]  4th Floor  [facility] [address] [phone]            Mar 08, 2018  9:00 AM CST   (Arrive by 8:45 AM)   EMG with Rajan Snowden MD   [facility] EMG ([facility])    [address] [place] [address]  [address] [facility] [address]   [phone]            Mar 08, 2018 12:15 PM CST   (Arrive by 12:00 PM)   New Patient Visit with Noemy Nguyen MD   [facility] Neurology ([facility])    [address] [place] [address]   [phone]            Apr 05, 2018  4:00 PM CDT   (Arrive by 3:45 PM)   Return Visit with Bertha Moncada MD   [facility] Neurosurgery ([facility])    [address] [place] [address]   [phone]            Apr 19, 2018 10:55 AM CDT   (Arrive by 10:40 AM)   Return Visit with Ellyn Rivera MD   [facility] ([facility])    [address] [place] [address]   [phone]            Jul 11, 2018 11:15 AM CDT   (Arrive by 11:00 AM)   Return Visit with Wade Singh MD   [facility]rology and [facility] for Prostate and Urologic Cancers ([facility])    [address] [place] [address]   [phone]              Future tests that were ordered for you today     Open Future Orders        Priority Expected Expires Ordered    Follow-Up with Device Clinic-6 months Routine 8/29/2018 11/27/2018 3/2/2018            Who to contact     If you have questions or need follow up information about today's clinic visit or your schedule please contact [facility] directly at [phone].  Normal or non-critical lab and imaging results will be communicated to you by Neighborlandhart, letter or phone within 4 business days after the clinic has received the results. If you do not hear from us within 7 days, please contact the clinic through Neighborlandhart or phone. If you have a critical or abnormal lab result, we will notify you by phone as soon as possible.  Submit refill requests through ELVPHD or call your pharmacy and they will forward the refill request to us. Please allow 3 business days for your refill to be completed.          Additional Information About Your Visit        NeighborlandharROCKI Information     ELVPHD gives you secure access to your electronic health record. If you see a primary  care provider, you can also send messages to your care team and make appointments. If you have questions, please call your primary care clinic.  If you do not have a primary care provider, please call [phone] and they will assist you.        Care EveryWhere ID     This is your Care EveryWhere ID. This could be used by other organizations to access your [place] medical records  [id number]         Blood Pressure from Last 3 Encounters:   02/09/18 132/88   01/08/18 123/84   01/03/18 111/75    Weight from Last 3 Encounters:   02/09/18 90.5 kg (199 lb 9.6 oz)   01/19/18 88.5 kg (195 lb 3.2 oz)   01/03/18 88.5 kg (195 lb)              We Performed the Following     (57143) INTERR DEVICE EVAL IN PERSON, LOOP RECORDER        Primary Care Provider Office Phone # Fax #    Ellyn Rivera [phone]       5 [address] [address]        Equal Access to Services     [facility]: Hadii aad ku hadasho Soomaali, waaxda luqadaha, qaybta kaalmada adeegyada, waxbrian edwardsin haystanley dykes . So [facility] [phone].    ATENCIÓN: Si habla español, tiene a sweeney disposición servicios gratuitos de asistencia lingüística. [facility] [phone].    We comply with applicable federal civil rights laws and [place] laws. We do not discriminate on the basis of race, color, national origin, age, disability, sex, sexual orientation, or gender identity.            Thank you!     Thank you for choosing [facility]  for your care. Our goal is always to provide you with excellent care. Hearing back from our patients is one way we can continue to improve our services. Please take a few minutes to complete the written survey that you may receive in the mail after your visit with us. Thank you!             Your Updated Medication List - Protect others around you: Learn how to safely use, store and throw away your medicines at www.disposemymeds.org.          This list is accurate as of  3/2/18 11:52 AM.  Always use your most recent med list.                   Brand Name Dispense Instructions for use Diagnosis    ADVAIR DISKUS 250-50 MCG/DOSE diskus inhaler   Generic drug:  fluticasone-salmeterol      Inhale 1 puff into the lungs 2 times daily as needed        albuterol 108 (90 BASE) MCG/ACT Inhaler    PROAIR HFA/PROVENTIL HFA/VENTOLIN HFA     Inhale 2 puffs into the lungs        amitriptyline 10 MG tablet    ELAVIL    270 tablet    Take 3 tablets (30 mg) by mouth At Bedtime    Chronic migraine without aura without status migrainosus, not intractable       buprenorphine 10 MCG/HR WK patch    BUTRANS    4 patch    Place 1 patch onto the skin every 7 days    Postlaminectomy syndrome of lumbar region       cetirizine 10 MG tablet    zyrTEC     Take 10 mg by mouth daily        COMPRESSION STOCKINGS     3 each    1 each daily 20-30 mmHg Thigh Length measure and fit, color and style per patient preference. Doff n jose per need.    Orthostatic hypotension       eletriptan 20 MG tablet    RELPAX    18 tablet    Take 1 tablet (20 mg) by mouth at onset of headache for migraine May repeat in 2 hours. Max 4 tablets/24 hours.    Chronic daily headache, Intractable migraine without aura and with status migrainosus       EPINEPHrine 0.3 MG/0.3ML injection 2-pack    EPIPEN/ADRENACLICK/or ANY BX GENERIC EQUIV     Inject 0.3 mg into the muscle        famotidine 20 MG tablet    PEPCID    90 tablet    Take 1 tablet (20 mg) by mouth At Bedtime    Hiatal hernia, Mast cell disorder       fluticasone 50 MCG/ACT spray    FLONASE    1 Bottle    Spray 2 sprays into both nostrils daily    Chronic rhinitis, unspecified type       ketamine (KETALAR) 5%-gabapentin (NEURONTIN) 8%-lidocaine 2.5% 5%-8% Gel topical PLO cream     30 g    Apply 1 g topically 3 times daily as needed    Postlaminectomy syndrome of lumbosacral region, Postlaminectomy syndrome of lumbar region       linaclotide 145 MCG capsule    LINZESS    30 capsule     Take 1 capsule (145 mcg) by mouth every morning (before breakfast)    Chronic idiopathic constipation       meloxicam 7.5 MG tablet    MOBIC    60 tablet    Take 1 tablet (7.5 mg) by mouth 2 times daily    Postlaminectomy syndrome       metoclopramide 5 MG tablet    REGLAN    20 tablet    Take 1 tablet (5 mg) by mouth every 6 hours as needed    Intractable chronic migraine without aura and with status migrainosus       MULTIVITAMIN PO      Take by mouth daily        omeprazole 20 MG CR capsule    priLOSEC    90 capsule    Take 1 capsule (20 mg) by mouth daily    Hiatal hernia       ondansetron 4 MG ODT tab    ZOFRAN-ODT    60 tablet    Take 1-2 tablets (4-8 mg) by mouth every 12 hours as needed for nausea    Migraine without status migrainosus, not intractable, unspecified migraine type       PROBIOTIC DAILY PO      Take by mouth 2 times daily 2 packets daily        SUMAtriptan 6 MG/0.5ML pen injector kit    IMITREX STATDOSE    2 kit    Inject 0.5 mLs (6 mg) Subcutaneous at onset of headache for migraine (may repeat once after 2 hrs) May repeat in 1 hour. Max 12 mg/24 hours.    Chronic migraine without aura without status migrainosus, not intractable       SYNTHROID 25 MCG tablet   Generic drug:  levothyroxine      Take 25 mcg by mouth daily        tiZANidine 4 MG tablet    ZANAFLEX    90 tablet    TAKE 2 TABLETS BY MOUTH 3 TIMES DAILY AS NEEDED FOR MUSCLE SPASMS    Post laminectomy syndrome       traMADol 50 MG tablet    ULTRAM    15 tablet    Take 1 tablet (50 mg) by mouth every 6 hours as needed for pain maximum 4 tablet(s) per day    Lumbar post-laminectomy syndrome       zonisamide 25 MG capsule    Zonegran    360 capsule    Take 125mg daily for 2 weeks, then 150mg    Occipital neuritis

## 2018-03-02 NOTE — MR AVS SNAPSHOT
After Visit Summary   3/2/2018    Ada Welch    MRN: [id number]           Patient Information     Date Of Birth          1970        Visit Information        Provider Department      3/2/2018 12:00 PM Tiara Fletcher APRN CNP [facility]        Today's Diagnoses     Autonomic dysfunction    -  1    Orthostatic hypotension          Care Instructions    Cardiology Provider you saw in clinic today: Tiara KITCHEN, NP-C    Medication Changes:  Start midodrine 5 mg first thing in the morning with 20 ounces of water/propel mix and another 5 mg 4-6 hours later (about noon). Do not take within 5 hours of going to bed.    Labs/Tests needed:  Urine histamine.  Echocardiogram.     Follow-up Visit:  3 months    Further Instructions:  60 ounces water/propel mix daily. Isometric exercises. Eat small meals. Bike compression shorts.    You will receive all normal lab and testing results via imbookin (Pogby)t or mail if not reviewed in clinic today. Please contact our office if you need assistance with setting up ECOhart.    If you need a medication refill please contact your pharmacy. Please allow 3 business days for your refill to be completed.     As always, thank you for trusting us with your health care needs!    If you have any questions regarding your visit please contact your care team:   Cardiology  Telephone Number    EP RN  Electrophysiology Nurse Coordinator [phone]     Call for EP procedure scheduling concerns  BRIAN Lira  [phone]           Device Clinic (Pacemakers, ICDs, Loop)   RN's : Sierra Aguirre Connie, Dawn During business hours: [phone]    After business hours:   [phone]- select option 4 and ask for job code [phone].                  Follow-ups after your visit        Additional Services     Follow-Up with EP Cardiac  Advanced Practice Provider- 3 Months                 Follow-up notes from your care team     Return in about 3 months (around  6/2/2018) for Tiara Fletcher.      Your next 10 appointments already scheduled     Mar 02, 2018  1:15 PM CST   Lab with  LAB   [facility] ([facility])    [address] [place] [address]   [phone]            Mar 07, 2018  8:40 AM CST   (Arrive by 8:25 AM)   Return Visit with Faiza Martinez MD   [facility] ([facility])    [address] [place] [address]   [phone]            Mar 08, 2018  9:00 AM CST   (Arrive by 8:45 AM)   EMG with Rajan Snowden MD   [facility] EMG ([facility])    [address] [place] [address]   [phone]            Mar 08, 2018 12:15 PM CST   (Arrive by 12:00 PM)   New Patient Visit with Noemy Nguyen MD   [facility] Neurology ([facility])    [address] [place] [address]   [phone]            Apr 05, 2018  4:00 PM CDT   (Arrive by 3:45 PM)   Return Visit with Bertha Moncada MD   [facility] Neurosurgery ([facility])    [address] [place] [address] [phone]            Apr 19, 2018 10:55 AM CDT   (Arrive by 10:40 AM)   Return Visit with Ellyn Rivera MD   [facility] ([facility])    [address] [place] [address] [phone]            Jun 07, 2018  9:30 AM CDT   Ech Complete with [id number]   [facility] Echo ([facility])    [address] [place] [address] [phone]           1. Please bring or wear a comfortable two-piece outfit. 2. You may eat, drink and take your normal medicines. 3. For any questions that cannot be answered, please contact the ordering physician            Jun 07, 2018 11:00 AM CDT   (Arrive by 10:45 AM)    RETURN ARRHYTHMIA with FIDELINA Mora CNP   [facility] ([facility])    [address]  [address]  [facility] [address] [phone]            Jul 11, 2018 11:15 AM CDT   (Arrive by 11:00 AM)   Return Visit with Wade Singh MD   [facility] and [facility] for Prostate and Urologic Cancers ([facility])    [address]  4th Floor  [facility] [address] [phone]              Future tests that were ordered for you today     Open Future Orders        Priority Expected Expires Ordered    Follow-Up with EP Cardiac  Advanced Practice Provider- 3 Months Routine 5/31/2018 8/29/2018 3/2/2018    Follow-Up with Device Clinic-6 months Routine 8/29/2018 11/27/2018 3/2/2018            Who to contact     If you have questions or need follow up information about today's clinic visit or your schedule please contact [facility] directly at [phone].  Normal or non-critical lab and imaging results will be communicated to you by SmartEquiphart, letter or phone within 4 business days after the clinic has received the results. If you do not hear from us within 7 days, please contact the clinic through SmartEquiphart or phone. If you have a critical or abnormal lab result, we will notify you by phone as soon as possible.  Submit refill requests through Transinsight or call your pharmacy and they will forward the refill request to us. Please allow 3 business days for your refill to be completed.          Additional Information About Your Visit        Transinsight Information     Transinsight gives you secure access to your electronic health record. If you see a primary care provider, you can also send messages to your care team and make appointments. If you have questions, please call your primary care clinic.  If you do not have a primary care provider, please call [phone] and they will assist you.        Care EveryWhere ID     This is your Care  "EveryWhere ID. This could be used by other organizations to access your [facility] medical records  [id number]        Your Vitals Were     Pulse Height Pulse Oximetry BMI (Body Mass Index)          112 1.702 m (5' 7\") 96% 30.7 kg/m2         Blood Pressure from Last 3 Encounters:   03/02/18 124/84   02/09/18 132/88   01/08/18 123/84    Weight from Last 3 Encounters:   03/02/18 88.9 kg (196 lb)   02/09/18 90.5 kg (199 lb 9.6 oz)   01/19/18 88.5 kg (195 lb 3.2 oz)              We Performed the Following     EKG 12-lead, tracing only (Same Day)     Histamine urine          Today's Medication Changes          These changes are accurate as of 3/2/18  1:05 PM.  If you have any questions, ask your nurse or doctor.               Start taking these medicines.        Dose/Directions    midodrine 5 MG tablet   Commonly known as:  PROAMATINE   Used for:  Orthostatic hypotension   Started by:  Tiara Fletcher APRN CNP        Dose:  5 mg   Take 1 tablet (5 mg) by mouth 2 times daily Take 5mg in the morning and 5 mg 4-6 hours later. Do not take within 5 hours of laying down.   Quantity:  180 tablet   Refills:  3            Where to get your medicines      These medications were sent to [facility] [id number] [place] [address] [address]     Phone:  [phone]     midodrine 5 MG tablet                Primary Care Provider Office Phone # Fax #    Ellyn Rivera [phone]       [address] [address]        Equal Access to Services     LETA GUY AH: Ashutosh Slater, herve oakes, odette garcia. So [facility] [phone].    ATENCIÓN: Si habla español, tiene a sweeney disposición servicios gratuitos de asistencia lingüística. Isaías al [phone].    We comply with applicable federal civil rights laws and [place] laws. We do not discriminate on the basis of race, color, national " origin, age, disability, sex, sexual orientation, or gender identity.            Thank you!     Thank you for choosing [facility]  for your care. Our goal is always to provide you with excellent care. Hearing back from our patients is one way we can continue to improve our services. Please take a few minutes to complete the written survey that you may receive in the mail after your visit with us. Thank you!             Your Updated Medication List - Protect others around you: Learn how to safely use, store and throw away your medicines at www.disposemymeds.org.          This list is accurate as of 3/2/18  1:05 PM.  Always use your most recent med list.                   Brand Name Dispense Instructions for use Diagnosis    ADVAIR DISKUS 250-50 MCG/DOSE diskus inhaler   Generic drug:  fluticasone-salmeterol      Inhale 1 puff into the lungs 2 times daily as needed        albuterol 108 (90 BASE) MCG/ACT Inhaler    PROAIR HFA/PROVENTIL HFA/VENTOLIN HFA     Inhale 2 puffs into the lungs        amitriptyline 10 MG tablet    ELAVIL    270 tablet    Take 3 tablets (30 mg) by mouth At Bedtime    Chronic migraine without aura without status migrainosus, not intractable       buprenorphine 10 MCG/HR WK patch    BUTRANS    4 patch    Place 1 patch onto the skin every 7 days    Postlaminectomy syndrome of lumbar region       cetirizine 10 MG tablet    zyrTEC     Take 10 mg by mouth daily        COMPRESSION STOCKINGS     3 each    1 each daily 20-30 mmHg Thigh Length measure and fit, color and style per patient preference. Doff n jose per need.    Orthostatic hypotension       eletriptan 20 MG tablet    RELPAX    18 tablet    Take 1 tablet (20 mg) by mouth at onset of headache for migraine May repeat in 2 hours. Max 4 tablets/24 hours.    Chronic daily headache, Intractable migraine without aura and with status migrainosus       EPINEPHrine 0.3 MG/0.3ML injection 2-pack    EPIPEN/ADRENACLICK/or ANY BX GENERIC EQUIV      Inject 0.3 mg into the muscle        famotidine 20 MG tablet    PEPCID    90 tablet    Take 1 tablet (20 mg) by mouth At Bedtime    Hiatal hernia, Mast cell disorder       fluticasone 50 MCG/ACT spray    FLONASE    1 Bottle    Spray 2 sprays into both nostrils daily    Chronic rhinitis, unspecified type       ketamine (KETALAR) 5%-gabapentin (NEURONTIN) 8%-lidocaine 2.5% 5%-8% Gel topical PLO cream     30 g    Apply 1 g topically 3 times daily as needed    Postlaminectomy syndrome of lumbosacral region, Postlaminectomy syndrome of lumbar region       linaclotide 145 MCG capsule    LINZESS    30 capsule    Take 1 capsule (145 mcg) by mouth every morning (before breakfast)    Chronic idiopathic constipation       meloxicam 7.5 MG tablet    MOBIC    60 tablet    Take 1 tablet (7.5 mg) by mouth 2 times daily    Postlaminectomy syndrome       metoclopramide 5 MG tablet    REGLAN    20 tablet    Take 1 tablet (5 mg) by mouth every 6 hours as needed    Intractable chronic migraine without aura and with status migrainosus       midodrine 5 MG tablet    PROAMATINE    180 tablet    Take 1 tablet (5 mg) by mouth 2 times daily Take 5mg in the morning and 5 mg 4-6 hours later. Do not take within 5 hours of laying down.    Orthostatic hypotension       MULTIVITAMIN PO      Take by mouth daily        omeprazole 20 MG CR capsule    priLOSEC    90 capsule    Take 1 capsule (20 mg) by mouth daily    Hiatal hernia       ondansetron 4 MG ODT tab    ZOFRAN-ODT    60 tablet    Take 1-2 tablets (4-8 mg) by mouth every 12 hours as needed for nausea    Migraine without status migrainosus, not intractable, unspecified migraine type       PROBIOTIC DAILY PO      Take by mouth 2 times daily 2 packets daily        SUMAtriptan 6 MG/0.5ML pen injector kit    IMITREX STATDOSE    2 kit    Inject 0.5 mLs (6 mg) Subcutaneous at onset of headache for migraine (may repeat once after 2 hrs) May repeat in 1 hour. Max 12 mg/24 hours.    Chronic migraine  without aura without status migrainosus, not intractable       SYNTHROID 25 MCG tablet   Generic drug:  levothyroxine      Take 25 mcg by mouth daily        tiZANidine 4 MG tablet    ZANAFLEX    90 tablet    TAKE 2 TABLETS BY MOUTH 3 TIMES DAILY AS NEEDED FOR MUSCLE SPASMS    Post laminectomy syndrome       traMADol 50 MG tablet    ULTRAM    15 tablet    Take 1 tablet (50 mg) by mouth every 6 hours as needed for pain maximum 4 tablet(s) per day    Lumbar post-laminectomy syndrome       zonisamide 25 MG capsule    Zonegran    360 capsule    Take 125mg daily for 2 weeks, then 150mg    Occipital neuritis

## 2018-03-02 NOTE — PROGRESS NOTES
Clinical Cardiac Electrophysiology    HPI: Ms. Scar Welch is a 47 year-old female who presents with her daughter for follow up of autonomic dysfuction.  The patient has a past medical history significant for dysautonomia, Tom-Danlos, Chiari malformation, eosinophilic esophagitis, and gastroparesis. Since 2014, she notes that her heart rates have fluctuated from as low as 40 to as high as 150 bpm and her BP would also fluctuate at the same time without known aggravating factors. Fluctuations of HR happen when she is supine, sitting, standing, walking, etc and symptoms associated with the fluctuations include LH and dizziness. She had two episodes of blacking out for a few seconds with a prodrome of LH, dizziness, nausea, diaphoresis, black spots in vision before the episodes and was noted to be pale by her . She saw Dr. Tello with these complaints and patient had a ILR placed 8/2017. An echocardiogram was ordered but never completed.  ILR showed poor R wave sensing but did show tachycardia with rates in the 150's. In addition, she underwent tilt table testing at [place]; however, the results were unclear. Patient has several signs of autonomic dysfunction including HR elevation with positional changes found today in clinic and HR elevation to the 170's on ILR. However, there are some atypical features such as HR elevation at home in any position (supine or standing). Patient has several signs of autonomic dysfunction including HR elevation with positional changes found with orthostats in clinic 10/5/2017 and HR elevation to the 170's on ILR. However, there are some atypical features such as HR elevation at home in any position (supine or standing). Given poor sensing of ILR, a Cardionet was done 10/5/2017-10/14/2017 and showed sinus rhythm and frequent sinus tachycardia correlating with symptoms of dyspnea and racing heart.  A repeat tilt table was done 10/20/2017 and was normal. Of note, patient  has a history of allergic reactions. She states she often has skin rashes that sometimes improve with anti-histamines so to address the possibility of mast cell activation the following tests were done 10/2017: ESR (normal 8), CRP(normal <2.9), urine histamine(elevated at 1273, ref range 0-450), Urine catecholamines(normal), prostaglandins(normal).     She states that she has 3-4 episodes of presyncope a week without any aggravating factors noted. Patient states that never misses doses of medication. Patient denies tobacco use, quit smoking 3 years ago. Drinking 100 ounces water daily, not drinking Pedialyte or propel.  Denies drinking alcohol.  Drinks 4 cans soda caffeinated beverages daily. States that activity level is light, limited by neck and back pain.  She has chest wall pain a few times a week, lasts for a couple of minutes to half an hour, resolves with change in position, muscle relaxer, or water, can happen during rest or when up and moving.  States that she has pedal edema if she doesn't wear compression stockings. Denies syncope, angina, dyspnea at rest or with exertion, dry cough, palpitations, orthopnea, PND, abdominal pain, abdominal edema, or claudication.     Orthostatic BP: supine 126/90 HR 94, sitting 127/87 , standing 120/79 , standing 1 minute 128/89 , standing 3 minute 124/84   Presenting EKG: NSR, Vent rate 90, WV interval 152 ms, QRS duration 88 ms, QTc 455 ms.  Current Outpatient Prescriptions   Medication Sig Dispense Refill     traMADol (ULTRAM) 50 MG tablet Take 1 tablet (50 mg) by mouth every 6 hours as needed for pain maximum 4 tablet(s) per day 15 tablet 0     buprenorphine (BUTRANS) 10 MCG/HR WK patch Place 1 patch onto the skin every 7 days 4 patch 1     amitriptyline (ELAVIL) 10 MG tablet Take 3 tablets (30 mg) by mouth At Bedtime 270 tablet 3     meloxicam (MOBIC) 7.5 MG tablet Take 1 tablet (7.5 mg) by mouth 2 times daily 60 tablet 1     fluticasone  (FLONASE) 50 MCG/ACT spray Spray 2 sprays into both nostrils daily 1 Bottle 3     tiZANidine (ZANAFLEX) 4 MG tablet TAKE 2 TABLETS BY MOUTH 3 TIMES DAILY AS NEEDED FOR MUSCLE SPASMS 90 tablet 3     zonisamide (ZONEGRAN) 25 MG capsule Take 125mg daily for 2 weeks, then 150mg 360 capsule 3     linaclotide (LINZESS) 145 MCG capsule Take 1 capsule (145 mcg) by mouth every morning (before breakfast) 30 capsule 3     eletriptan (RELPAX) 20 MG tablet Take 1 tablet (20 mg) by mouth at onset of headache for migraine May repeat in 2 hours. Max 4 tablets/24 hours. 18 tablet 1     levothyroxine (SYNTHROID) 25 MCG tablet Take 25 mcg by mouth daily       famotidine (PEPCID) 20 MG tablet Take 1 tablet (20 mg) by mouth At Bedtime 90 tablet 1     omeprazole (PRILOSEC) 20 MG CR capsule Take 1 capsule (20 mg) by mouth daily 90 capsule 1     ondansetron (ZOFRAN-ODT) 4 MG ODT tab Take 1-2 tablets (4-8 mg) by mouth every 12 hours as needed for nausea 60 tablet 1     metoclopramide (REGLAN) 5 MG tablet Take 1 tablet (5 mg) by mouth every 6 hours as needed 20 tablet 1     EPINEPHrine (EPIPEN/ADRENACLICK/OR ANY BX GENERIC EQUIV) 0.3 MG/0.3ML injection 2-pack Inject 0.3 mg into the muscle       albuterol (PROAIR HFA/PROVENTIL HFA/VENTOLIN HFA) 108 (90 BASE) MCG/ACT Inhaler Inhale 2 puffs into the lungs       COMPRESSION STOCKINGS 1 each daily 20-30 mmHg Thigh Length measure and fit, color and style per patient preference. Doff n jose per need. 3 each 3     SUMAtriptan (IMITREX STATDOSE) 6 MG/0.5ML pen injector kit Inject 0.5 mLs (6 mg) Subcutaneous at onset of headache for migraine (may repeat once after 2 hrs) May repeat in 1 hour. Max 12 mg/24 hours. 2 kit 1     ketamine, KETALAR, 5%-gabapentin, NEURONTIN, 8%-lidocaine 2.5% 5%-8% GEL topical PLO cream Apply 1 g topically 3 times daily as needed 30 g 3     fluticasone-salmeterol (ADVAIR DISKUS) 250-50 MCG/DOSE diskus inhaler Inhale 1 puff into the lungs 2 times daily as needed         cetirizine (ZYRTEC) 10 MG tablet Take 10 mg by mouth daily        Multiple Vitamins-Minerals (MULTIVITAMIN PO) Take by mouth daily       Probiotic Product (PROBIOTIC DAILY PO) Take by mouth 2 times daily 2 packets daily         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     Chronic sinusitis 00/2007    Diagnosed with chronic pansinusitis 2016     Chronic tonsillitis 1980    Had tonsils removed 02/1981, rheumatic fever     Tom-Danlos disease      Gastroesophageal reflux disease 3/1/2017    I have large hiatal hernia     Hernia, abdominal 10/2016    Hiatal Hernia     Hiatal hernia 2016    Have adeline hiatel hernia     Hoarseness Unsure    It comes and goes     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     Migraines 1/1/2007    Unsure of exact date     Nasal polyps 2013    Were revoved 03/2017, benign     Pneumonia Unsure    Have had pneumonia several times     Swelling, mass, or lump in head and neck 08/2016    Lymph node has been growing for last year     Thyroid disease 01/01/2008       Past Surgical History:   Procedure Laterality Date     ADENOIDECTOMY  1981     AS REPAIR OF NASAL SEPTUM       NASAL/SINUS POLYPECTOMY  2017    Polyps were benign     TONSILLECTOMY       TONSILLECTOMY  1981     TUBAL LIGATION         Family History   Problem Relation Age of Onset     Connective Tissue Disorder Mother      eds     Connective Tissue Disorder Sister      eds     CANCER Father      Spinal tumor grew into spinal cord, went in brain     Migraines Father      DIABETES Maternal Grandmother      Elderly diabetes     HEART DISEASE Maternal Grandmother      Hypertension Maternal Grandmother      DIABETES Paternal Grandmother      Elderly  diabetes     DIABETES Paternal Grandfather      Elderly diabetes     Asthma Sister      Pediatric Asthma     Migraines Sister      Asthma Son      Pediatric Asthma     Thyroid Disease Sister      ,     Migraines Sister      Breast Cancer No family hx of        Social History   Substance Use Topics     Smoking status: Former Smoker     Packs/day: 0.20     Years: 10.00     Types: Cigarettes     Start date: 1/1/1991     Quit date: 12/1/2013     Smokeless tobacco: Never Used      Comment: I smoked on and off during specified dates.     Alcohol use Yes      Comment: couple per year       Allergies   Allergen Reactions     Bee Venom Anaphylaxis, Difficulty breathing, Palpitations, Shortness Of Breath and Visual Disturbance     Patient does carry Epi-Pen     No Clinical Screening - See Comments Anaphylaxis     Chicken-Derived Products (Egg) Diarrhea and Nausea     Patient will self monitor  Other reaction(s): Nausea  Patient will self monitor  Other reaction(s): GI intolerance     Gabapentin      Gluten Meal      Lactose Dermatitis and Diarrhea     Milk Digestant Nausea     Patient is aware of milk sensitivity, will manage own diet     Milk Protein Extract      Other reaction(s): GI intolerance\  EGGS      Topiramate      Wheat Diarrhea     Wheat Bran Nausea     Patient will self monitor  Other reaction(s): Nausea  Patient will self monitor     ECHOCARDIOGRAM 2016:  Location: [facility]  Room:  Status:OutPatient  Study time: 8 42    Allergies: NO KNOWN ALLRGY, NO LATEX ALGY    Reading Physician:  ALEX LOPEZ MD  Referring Physician:  ALEX LOPEZ MD  Adult Cardiac Sonographer:  SONY SOLIS CHRISTIANO    Indications: Tachycardia.    Procedure: The transthoracic approach was used. The study included complete 2D  imaging, M-mode, complete spectral Doppler, and color Doppler.    Summary:  Left ventricle: Size was normal. Systolic function was normal by visual  assessment. Ejection fraction was  estimated to be 55 %. There were no regional  wall motion abnormalities. Wall thickness was normal. Doppler parameters were  consistent with abnormal left ventricular relaxation (mild diastolic  dysfunction).  Aortic valve, LVOT: The valve was tricuspid. Aortic cusps demonstrated normal in  thickness and normal cuspal separation. There was no stenosis. There was no  regurgitation.  Mitral valve: Valve structure was normal. There was no evidence for stenosis.  There was no regurgitation.  Left atrium: Size was normal.  Right ventricle: The size was normal. Systolic function was normal. Wall  thickness was normal.  Tricuspid valve: There was no significant regurgitation.  Right atrium: Size was normal.  Pericardium, pleura: There was no pericardial effusion.    Comparisons:  There has been no significant interval change. Comparison was made with the  previous study of 05-Aug-2008.    Left ventricle: Size was normal. Systolic function was normal by visual  assessment. Ejection fraction was estimated to be 55 %. There were no regional  wall motion abnormalities. Wall thickness was normal. Doppler: Doppler  parameters were consistent with abnormal left ventricular relaxation (mild  diastolic dysfunction).    Aortic valve: The valve was tricuspid. Aortic cusps demonstrated normal in  thickness and normal cuspal separation. Doppler: Transaortic velocity was  within the normal range. There was no stenosis. There was no regurgitation.    Aorta: The root exhibited normal size. The ascending aorta was normal in size.    Mitral valve: Valve structure was normal. There was normal leaflet separation.  Doppler: The transmitral velocity was within the normal range. There was no  evidence for stenosis. There was no regurgitation.    Left atrium: Size was normal.    Atrial septum: There was no shunting across the atrial septum.    Right ventricle: The size was normal. Systolic function was normal. Wall  thickness was  "normal.    Pulmonic valve: The valve structure was normal. Doppler: There was no  significant regurgitation.    Tricuspid valve: Doppler: There was no significant regurgitation.    Right atrium: Size was normal.    Systemic veins: IVC: The inferior vena cava was normal in size and course.  Respirophasic changes were normal.    Pericardium: There was no pericardial effusion. The pericardium was normal in  appearance.    Measurement tables    2D measurements  Left ventricle   (Reference normals)  LVID ed, PLAX   39 mm   (35-60)  LVID es, PLAX   23 mm   (21-40)  FS, PLAX   41.03 %   (25-46)  LVPW thickness ed   11 mm   (6-11)  Ratio, IVS/LVPW   0.91    (<1.3)  Ventricular septum   (Reference normals)  IVS thickness ed   10 mm   (6-11)  LVOT   (Reference normals)  Diam   24 mm   (--)  Left atrium   (Reference normals)  Vol index    13.5 cc/m squared   (11-38)    M-mode measurements  Aorta   (Reference normals)  Root diam ed   37 mm   (20-37)    Doppler measurements  LVOT   (Reference normals)  Peak britni   83 cm/s   (--)  VTI   14.6 cm   (--)  Stroke vol   66.05 ml   (--)  Aortic valve   (Reference normals)  Peak britni   103 cm/s   (--)  VTI   19.33 cm   (--)  DSI, VTI   0.76    (--)  Valve area, VTI   3.44 cm squared   (--)  Obstr index, Vmax   0.81    (--)  Valve area, Vmax   3.66 cm squared   (--)  Mitral valve   (Reference normals)  Peak E britni   57.6 cm/s   (--)  Peak A britni   67.2 cm/s   (--)  Peak E/A ratio   0.86    (--)    Prepared and electronically signed by    ALEX LOPEZ MD  Signed    ROS:   Negative except for as indicated in HPI.    Physical Examination:  Vitals: /84 (BP Location: Left arm, Patient Position: Standing, Cuff Size: Adult Regular)  Pulse 112  Ht 1.702 m (5' 7\")  Wt 88.9 kg (196 lb)  SpO2 96%  BMI 30.7 kg/m2  BMI= Body mass index is 30.7 kg/(m^2).    GENERAL APPEARANCE: healthy, alert, and no acute distress  HEENT: no icterus, no xanthelasmas, normal pupil size and reaction, no " cyanosis.  NECK: no asymmetry, no JVD   CHEST: lungs clear to auscultation - no rales, rhonchi or wheezes, no use of accessory muscles, no retractions, respirations are unlabored, normal respiratory rate  CARDIOVASCULAR: regular rhythm, normal S1 with physiologic split S2, no S3 or S4 and no murmur, click or rub, precordium quiet with normal PMI.  ABDOMEN: soft, non-tender, without hepatosplenomegaly, no masses palpable, bowel sounds normal, aorta not enlarged by palpation, no abdominal bruits  EXTREMITIES: no clubbing, cyanosis, or edema  NEURO: alert and oriented to person/place/time, normal speech, gait and affect. Wearing a C-spine collar.  VASC: Radial  and posterior tibialis pulses +2 and symmetric bilaterally.   SKIN: no ecchymoses, no rashes      Assessment and recommendations:    1. AUTONOMIC DYSFUNCTION:    1. Try to drink 50-60 ounces of 50/50 electrolyte fluids/water mix per day.    Examples: Propel or Pedialyte. Gatorade and Powerade are higher in sugar/calories and are okay in moderation or if you are an athlete.    2.  Avoid salt tablets. Use salt liberally on foods.    3. Take medications as prescribed.  Midodrine: Keep the am dose of midodrine at the bedside along with 20 oz of fluid (Propel/water), as long as the medication can be kept safely away from children or pets. The am dose should be taken with 20 oz of fluid 15-20 minutes before rising. The dose of midodrine can be repeat at noon as long as patient will be up and out of bed. Midodrine should not be taken within 5 hours of going to bed. Midodrine should not be taken if the patient will be staying in bed. Midodrine can cause severe hypertension if the patient remains supine. Other effects include shivering, 'goose bumps' and occasionally difficulty urinating or increase frequency of urnination. Midodrine should not be taken with ergot derivatives typically given for migrane headaches. Use of psuedoephedrine and ephedrine should be avoid  while taking midodrine.  Will get a CMP today as initiating midodrine.    4. Isometric exercises.  These exercises will assist in increasing intravascular pressure.  They also include swimming, rowing, recumbent bike.    5. Try to eat six small meals per day. Try to keep these meals low in carbohydrates and low in gluten.     6. Bike compression shorts or Spanx- use as much as possible, especially when standing for longer periods of time.     7.  EKG done today.    8.  Complete echocardiogram ordered by Dr. Tello to evaluate function of the heart.    9. Her work up for mast cell activation was normal except for  urine histamine (elevated at 1273, ref range 0-450). As this was an outlier but was very elevated, will repeat the test.  If positive and since she is already currently taking cetirizine, will consider adding ranitidine as a treatment option.     FOLLOW UP:  Follow up in three months or follow up sooner as needed for symptoms.      Patient expresses understanding and agreement with the plan.    I appreciate the chance to help with Ada hernandez. Please let me know if you have any questions or concerns.    Tiara KITCHEN NP-C  Answers for HPI/ROS submitted by the patient on 3/2/2018   General Symptoms: Yes  Skin Symptoms: Yes  HENT Symptoms: Yes  EYE SYMPTOMS: Yes  HEART SYMPTOMS: Yes  LUNG SYMPTOMS: Yes  INTESTINAL SYMPTOMS: Yes  URINARY SYMPTOMS: Yes  GYNECOLOGIC SYMPTOMS: Yes  BREAST SYMPTOMS: No  SKELETAL SYMPTOMS: Yes  BLOOD SYMPTOMS: Yes  NERVOUS SYSTEM SYMPTOMS: Yes  MENTAL HEALTH SYMPTOMS: No  Fever: Yes  Loss of appetite: Yes  Weight loss: Yes  Weight gain: Yes  Fatigue: Yes  Night sweats: Yes  Chills: Yes  Increased stress: No  Excessive hunger: No  Excessive thirst: Yes  Feeling hot or cold when others believe the temperature is normal: Yes  Loss of height: No  Post-operative complications: Yes  Surgical site pain: Yes  Hallucinations: No  Change in or Loss of Energy:  Yes  Hyperactivity: No  Confusion: Yes  Changes in hair: Yes  Changes in moles/birth marks: Yes  Itching: Yes  Rashes: Yes  Changes in nails: Yes  Acne: No  Hair in places you don't want it: No  Change in facial hair: No  Warts: No  Non-healing sores: Yes  Scarring: Yes  Flaking of skin: No  Color changes of hands/feet in cold : Yes  Sun sensitivity: Yes  Skin thickening: No  Ear pain: Yes  Ear discharge: Yes  Hearing loss: No  Tinnitus: Yes  Nosebleeds: No  Congestion: Yes  Sinus pain: Yes  Trouble swallowing: Yes   Voice hoarseness: Yes  Mouth sores: No  Sore throat: Yes  Tooth pain: No  Gum tenderness: No  Bleeding gums: No  Change in taste: Yes  Change in sense of smell: Yes  Dry mouth: Yes  Hearing aid used: No  Neck lump: Yes  Eye pain: Yes  Vision loss: Yes  Dry eyes: Yes  Watery eyes: Yes  Eye bulging: No  Double vision: Yes  Flashing of lights: Yes  Spots: Yes  Floaters: Yes  Redness: Yes  Crossed eyes: No  Tunnel Vision: Yes  Yellowing of eyes: No  Eye irritation: Yes  Cough: Yes  Sputum or phlegm: Yes  Coughing up blood: No  Difficulty breating or shortness of breath: Yes  Snoring: Yes  Wheezing: Yes  Difficulty breathing on exertion: Yes  Nighttime Cough: Yes  Difficulty breathing when lying flat: Yes  Chest pain or pressure: Yes  Fast or irregular heartbeat: Yes  Pain in legs with walking: Yes  Trouble breathing while lying down: Yes  Fingers or toes appear blue: Yes  High blood pressure: No  Low blood pressure: Yes  Fainting: No  Murmurs: No  Pacemaker: No  Varicose veins: No  Edema or swelling: Yes  Wake up at night with shortness of breath: Yes  Light-headedness: Yes  Exercise intolerance: Yes  Heart burn or indigestion: Yes  Nausea: Yes  Vomiting: No  Abdominal pain: Yes  Bloating: Yes  Constipation: Yes  Diarrhea: Yes  Blood in stool: No  Black stools: No  Rectal or Anal pain: No  Fecal incontinence: No  Yellowing of skin or eyes: No  Vomit with blood: No  Change in stools: No  Trouble holding  urine or incontinence: Yes  Pain or burning: Yes  Trouble starting or stopping: Yes  Increased frequency of urination: Yes  Blood in urine: No  Decreased frequency of urination: No  Frequent nighttime urination: Yes  Flank pain: Yes  Difficulty emptying bladder: Yes  Back pain: Yes  Muscle aches: Yes  Neck pain: Yes  Swollen joints: Yes  Joint pain: Yes  Bone pain: Yes  Muscle cramps: Yes  Muscle weakness: Yes  Joint stiffness: Yes  Bone fracture: No  Anemia: No  Swollen glands: Yes  Easy bleeding or bruising: Yes  Trouble with coordination: Yes  Dizziness or trouble with balance: Yes  Fainting or black-out spells: Yes  Memory loss: Yes  Headache: Yes  Seizures: No  Speech problems: No  Tingling: Yes  Tremor: Yes  Weakness: Yes  Difficulty walking: Yes  Paralysis: No  Numbness: Yes  Bleeding or spotting between periods: No  Heavy or painful periods: Yes  Irregular periods: No  Vaginal discharge: No  Hot flashes: No  Vaginal dryness: Yes  Genital ulcers: No  Reduced libido: No  Painful intercourse: No  Difficulty with sexual arousal: No  Post-menopausal bleeding: No

## 2018-03-02 NOTE — PROGRESS NOTES
"Preliminary Device Interrogation Results.  Final physician signed paceart report to be scanned and attached.    [place] seen in the [facility] [place] and iterative programming of a Medtronic ILR, per MD orders. She also has an appointment with Tiara Fletcher NP. Today her intrinsic rhythm is  bpm. She states her Tom-Danlos syndrome has been \"flaring a lot more and more intensely\" lately. Normal device function. No symptom activations have been recorded. 43 Tachy episodes recorded. The available EGMs show narrow-complex regular ventricular rhythms with rates up to 170 bpm. 110 lucero episodes recorded. The EGMs show bradycardia with rates 30-40 bpm lasting up to 3 minutes. These episodes occur at all times of the day and night. Plan for pt to send a [employer] remote transmission on 6/4/18 and RTC in 6 months.  Implanted Loop Recorder    "

## 2018-03-02 NOTE — TELEPHONE ENCOUNTER
Message from miloghart:  Original authorizing provider: MD Ada Warren would like a refill of the following medications:  linaclotide (LINZESS) 145 MCG capsule [Juana Griffith MD]    Preferred pharmacy: [facility] [id number] [place] [address]    Comment:

## 2018-03-02 NOTE — PATIENT INSTRUCTIONS
Cardiology Provider you saw in clinic today: Tiara KITCHEN, NP-C    Medication Changes:  Start midodrine 5 mg first thing in the morning with 20 ounces of water/propel mix and another 5 mg 4-6 hours later (about noon). Do not take within 5 hours of going to bed.    Labs/Tests needed:  Urine histamine.  Echocardiogram.     Follow-up Visit:  3 months    Further Instructions:  60 ounces water/propel mix daily. Isometric exercises. Eat small meals. Bike compression shorts.    You will receive all normal lab and testing results via [facility] or mail if not reviewed in clinic today. Please contact our office if you need assistance with setting up MyChart.    If you need a medication refill please contact your pharmacy. Please allow 3 business days for your refill to be completed.     As always, thank you for trusting us with your health care needs!    If you have any questions regarding your visit please contact your care team:   Cardiology  Telephone Number    EP RN  Electrophysiology Nurse Coordinator [phone]     Call for EP procedure scheduling concerns  BRIAN Lira  [phone]           Device Clinic (Pacemakers, ICDs, Loop)   RN's : Sierra Aguirre Connie, Dawn During business hours: [phone]    After business hours:   [phone]- select option 4 and ask for job code [phone].

## 2018-03-05 LAB — INTERPRETATION ECG - MUSE: NORMAL

## 2018-03-07 ENCOUNTER — OFFICE VISIT (OUTPATIENT)
Dept: ANESTHESIOLOGY | Facility: CLINIC | Age: 48
End: 2018-03-07
Payer: MEDICARE

## 2018-03-07 ENCOUNTER — RADIANT APPOINTMENT (OUTPATIENT)
Dept: GENERAL RADIOLOGY | Facility: CLINIC | Age: 48
End: 2018-03-07
Payer: MEDICARE

## 2018-03-07 VITALS
BODY MASS INDEX: 30.92 KG/M2 | DIASTOLIC BLOOD PRESSURE: 71 MMHG | WEIGHT: 197 LBS | SYSTOLIC BLOOD PRESSURE: 117 MMHG | HEART RATE: 132 BPM | RESPIRATION RATE: 16 BRPM | HEIGHT: 67 IN

## 2018-03-07 DIAGNOSIS — M96.1 LUMBAR POST-LAMINECTOMY SYNDROME: ICD-10-CM

## 2018-03-07 RX ORDER — TRAMADOL HYDROCHLORIDE 50 MG/1
50 TABLET ORAL EVERY 6 HOURS PRN
Qty: 15 TABLET | Refills: 0 | Status: SHIPPED | OUTPATIENT
Start: 2018-03-07 | End: 2018-05-14

## 2018-03-07 RX ORDER — DULOXETIN HYDROCHLORIDE 30 MG/1
30 CAPSULE, DELAYED RELEASE ORAL DAILY
Qty: 60 CAPSULE | Refills: 1 | Status: SHIPPED | OUTPATIENT
Start: 2018-03-07 | End: 2018-06-08

## 2018-03-07 ASSESSMENT — ANXIETY QUESTIONNAIRES
6. BECOMING EASILY ANNOYED OR IRRITABLE: NOT AT ALL
4. TROUBLE RELAXING: NOT AT ALL
GAD7 TOTAL SCORE: 1
2. NOT BEING ABLE TO STOP OR CONTROL WORRYING: NOT AT ALL
5. BEING SO RESTLESS THAT IT IS HARD TO SIT STILL: NOT AT ALL
7. FEELING AFRAID AS IF SOMETHING AWFUL MIGHT HAPPEN: NOT AT ALL
3. WORRYING TOO MUCH ABOUT DIFFERENT THINGS: NOT AT ALL
7. FEELING AFRAID AS IF SOMETHING AWFUL MIGHT HAPPEN: NOT AT ALL
GAD7 TOTAL SCORE: 1
1. FEELING NERVOUS, ANXIOUS, OR ON EDGE: SEVERAL DAYS

## 2018-03-07 ASSESSMENT — PAIN SCALES - GENERAL: PAINLEVEL: EXTREME PAIN (8)

## 2018-03-07 NOTE — LETTER
3/7/2018       RE: Ada Welch  [address] [place] [address]     Dear Colleague,    Thank you for referring your patient, Ada Welch, to the [facility] COM[facility] MANAGEMENT at [facility]. Please see a copy of my visit note below.    [facility] Comprehensive Chronic Pain Management : Progress Note    Date of visit: 3/8/2018    Interval history:  Ada Welch is a 47 year old female with past medical history significant for Ehler Danlos syndrome is known to me for chronic  low back pain, which is mostly located at lower lumbar area. The patient has very complex history of low back pain for nearly 24 years for which she underwent L4-S1 fusion (TLIF) 12/09/2013.  Postoperatively her  back pain even became much worse than preop.  Mainly LBP, but also radiates down both legs S1 dermatome distribution.   She then had  removal of posterior instrumentation (screws and guadalupe) with exploration of fusion.  Fusion noted to already be solid.  NO significant relief afforded by this surgery. The pain has been progressively worsened, limiting the patient s activities, affecting mood euvolemic from a refill of the small very long for this to go to the [employer] drive and should be used to emergency and sleep quality, and causing a decrease in the patient s quality of life.  She underwent multiple SUNSHINE's which were not helpful, and even made things worse.  Recently started aquatic based physical therapy. Previous EMG showed some L5 radiculopathy on left. Her pain significantly improved after we started her butrans patch. She continues to be more functional than before the butrans was prescribed. She is able to walk longer distances and do activities with less pain that before. She has numbness in her left 4th toe that has been constant since her surgery. She denies any significant side effects from the medications. She also denies  "any new bowel or bladder issues, new weakness or numbness.      Today she came for f/u visit.  The patient continues to have headache, neck pain, back pain, bilateral lower extremity pain.  Patient has seen neurologist Dr. Galeano.  She had an EMG study performed by Dr. Pickard, which showed an L5 radiculopathy on the left.  A repeat EMG ordered by Dr. Galeano.  Dr. Galeano referred her to Dr. Noemy Nguyen, headache specialist.  During the last clinic visit we performed bilateral occipital nerve block, however she did not get  adequate relief.    She has also been seeing Dr. Singh  for persistent UTI.  She has been seeing Dr. Moncada for chronic bilateral lower extremity pain.  Per Dr. 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  No surgery required for her current symptomatology.  She has been on the stable dose of Butrans patch 10 mics per hour.  She reports that recently her pain symptoms increased for which she has been taking intermittently tramadol  50 mg, approximately 15-30 tablets a month.  She has been also on amitriptyline 30 mg nightly, acetaminophen as needed, and tizanidine 8 mg as needed.  Overall her pain symptoms are stable.  She has an upcoming trip to [place] and she is excited about that.        Recommendations/plan at the last visit included:  - Interventions: Will schedule her for occipital blocks.  SUNSHINE's and SCS and other options have been discussed with her but this would be a last resort as she feels her body \"rejects foreign objects\". Discussed about Botox injection for migraine headache if conservative therapies fail.   - Medications:  getting excellent benefit with butrans at this time and is on a stable dose.    -  Continue tizanidine 8mg to help with her muscle spasms.  Continue ibuprofen and ACETAMINOPHEN prn.  - Take Amitriptyline 30 mg at bedtime.  - Physical Therapy:  Continue as scheduled - she also does aquatic " "therapy - she will continue her HEP  - Psychology/Coping skills: continue therapy in [place]  - Diagnostic Studies:  None today     Follow up: 3 months      [place] Prescription Monitoring Program:   Reviewed. No concerns    Review of Systems:  The 14 system ROS was reviewed and was negative except what is documented above and as follows.  Any bowel or bladder problems: none  Mood: okay    Physical Exam:  Vitals:    03/07/18 0905   BP: 117/71   Pulse: 132   Resp: 16   Weight: 89.4 kg (197 lb)   Height: 1.702 m (5' 7\")       General: Awake in no apparent distress.   Eyes: Sclerae are anicteric. PERRLA, EOMI   Neck: supple, no masses.   Lungs: unlabored.   Heart: regular rate and rhythm   Abdomen: soft non tender.  Extremities: Pulses are well palpable, no peripheral edema.   Musculoskeletal: 5/5 muscle strength in all extremities.   Neurologic exam:Sensation intact throughout all dermatomes bilateral upper extremities and lower extremities  Psychiatric; Normal affect.   Skin: Warm and Dry.    Medications:  Current Outpatient Prescriptions   Medication Sig Dispense Refill     traMADol (ULTRAM) 50 MG tablet Take 1 tablet (50 mg) by mouth every 6 hours as needed for pain maximum 4 tablet(s) per day 15 tablet 0     DULoxetine (CYMBALTA) 30 MG EC capsule Take 1 capsule (30 mg) by mouth daily 60 capsule 1     linaclotide (LINZESS) 145 MCG capsule Take 1 capsule (145 mcg) by mouth every morning (before breakfast) Last refill until GI clinic follow up. 30 capsule 1     midodrine (PROAMATINE) 5 MG tablet Take 1 tablet (5 mg) by mouth 2 times daily Take 5mg in the morning and 5 mg 4-6 hours later. Do not take within 5 hours of laying down. 180 tablet 3     buprenorphine (BUTRANS) 10 MCG/HR WK patch Place 1 patch onto the skin every 7 days 4 patch 1     amitriptyline (ELAVIL) 10 MG tablet Take 3 tablets (30 mg) by mouth At Bedtime 270 tablet 3     meloxicam (MOBIC) 7.5 MG tablet Take 1 tablet (7.5 mg) by mouth 2 times daily 60 " tablet 1     fluticasone (FLONASE) 50 MCG/ACT spray Spray 2 sprays into both nostrils daily 1 Bottle 3     tiZANidine (ZANAFLEX) 4 MG tablet TAKE 2 TABLETS BY MOUTH 3 TIMES DAILY AS NEEDED FOR MUSCLE SPASMS 90 tablet 3     zonisamide (ZONEGRAN) 25 MG capsule Take 125mg daily for 2 weeks, then 150mg 360 capsule 3     eletriptan (RELPAX) 20 MG tablet Take 1 tablet (20 mg) by mouth at onset of headache for migraine May repeat in 2 hours. Max 4 tablets/24 hours. 18 tablet 1     levothyroxine (SYNTHROID) 25 MCG tablet Take 25 mcg by mouth daily       famotidine (PEPCID) 20 MG tablet Take 1 tablet (20 mg) by mouth At Bedtime 90 tablet 1     omeprazole (PRILOSEC) 20 MG CR capsule Take 1 capsule (20 mg) by mouth daily 90 capsule 1     ondansetron (ZOFRAN-ODT) 4 MG ODT tab Take 1-2 tablets (4-8 mg) by mouth every 12 hours as needed for nausea 60 tablet 1     metoclopramide (REGLAN) 5 MG tablet Take 1 tablet (5 mg) by mouth every 6 hours as needed 20 tablet 1     EPINEPHrine (EPIPEN/ADRENACLICK/OR ANY BX GENERIC EQUIV) 0.3 MG/0.3ML injection 2-pack Inject 0.3 mg into the muscle       albuterol (PROAIR HFA/PROVENTIL HFA/VENTOLIN HFA) 108 (90 BASE) MCG/ACT Inhaler Inhale 2 puffs into the lungs       COMPRESSION STOCKINGS 1 each daily 20-30 mmHg Thigh Length measure and fit, color and style per patient preference. Doff n jose per need. 3 each 3     SUMAtriptan (IMITREX STATDOSE) 6 MG/0.5ML pen injector kit Inject 0.5 mLs (6 mg) Subcutaneous at onset of headache for migraine (may repeat once after 2 hrs) May repeat in 1 hour. Max 12 mg/24 hours. 2 kit 1     fluticasone-salmeterol (ADVAIR DISKUS) 250-50 MCG/DOSE diskus inhaler Inhale 1 puff into the lungs 2 times daily as needed        cetirizine (ZYRTEC) 10 MG tablet Take 10 mg by mouth daily        Multiple Vitamins-Minerals (MULTIVITAMIN PO) Take by mouth daily       Probiotic Product (PROBIOTIC DAILY PO) Take by mouth 2 times daily 2 packets daily           LABORATORY  "VALUES:   Recent Labs   Lab Test  03/02/18   1340  10/20/17   1150   NA  138  138   POTASSIUM  3.8  3.7   CHLORIDE  105  107   CO2  24  23   ANIONGAP  9  8   GLC  89  108*   BUN  9  10   CR  0.88  0.78   JUAN  8.9  8.4*       CBC RESULTS:   Recent Labs   Lab Test  10/20/17   1150   WBC  6.1   RBC  4.29   HGB  13.4   HCT  38.8   MCV  90   MCH  31.2   MCHC  34.5   RDW  12.5   PLT  216       Most Recent 3 INR's:  Recent Labs   Lab Test  07/25/17   1249   INR  1.01       Diagnostic tests:            ASSESSMENT:  Chronic low back pain radiating to bilateral lower extremity  Persistent headache  Chronic neck pain  Chronic opioid therapy  Clinical diagnosis of Tom-Danlos syndrome  Chronic UTI  EMG finding of lower extremity radiculopathy  Bilateral lumbar facet arthritis  Anxiety disorder  Chronic pain syndrome  Post laminectomy pain syndrome      PLAN:    -  Interventions: None at this time. SUNSHINE's and SCS  options have been discussed with her but this would be a last resort as she feels her body \"rejects foreign objects\".   -  Medications:  getting excellent benefit with butrans at this time and is on a stable dose.    -  Continue tizanidine 8mg to help with her muscle spasms.  Continue ibuprofen and ACETAMINOPHEN prn.  - Take Amitriptyline 30 mg at bedtime.  - Take Butrans patch 10 mics per hour.  Change patch every 7 days.  Discussed about increasing the patch to 15 mics per hour.  - Meloxicam 7.5 mg twice daily  -Continue to take tramadol 50 mg every 6 hours as needed.  15 tablets dispensed today.  -Start taking Cymbalta 30 mg daily.  The dose may be increased to 60 mg nightly and 30 mg in the morning.  - Physical Therapy:  Continue as scheduled - she also does aquatic therapy - she will continue her HEP  - Psychology/Coping skills: continue therapy.   - Diagnostic Studies:  Will order  flexion-extension lumbar spine x-rays to diagnose the instability of the spine.     Follow up: 6 months        Assessment will be " ongoing with changes in treatment as indicated.  Benefits/risks/alternatives to treatment have been reviewed and the patient has been instructed to contact this office if they have any questions or concerns.  This plan of care has been discussed with the patient and the patient is in agreement.     Again, thank you for allowing me to participate in the care of your patient.      Sincerely,    Faiza Martinez MD

## 2018-03-07 NOTE — PATIENT INSTRUCTIONS
1. Tramadol refilled for 15 tablets.      2. Start taking cymbalta 30mg daily at bedtime.     3. Xray of lumbar spine ordered.     IMAGING SERVICES HOURS:    All imaging modalities are available from 7 a.m. - 9 p.m. Monday through Friday  X-ray, CT, MRI, and General Ultrasound appointments are available from 7 a.m. -3:30 p.m. on Saturdays  X-ray, CT and MRI appointments are available from 8 a.m. - 4:30 p.m. on Sundays  Please call [phone] to schedule imaging exams      Follow up: 6 months or sooner if needed       To speak with a nurse, schedule/reschedule/cancel a clinic appointment, or request a medication refill call: [phone]     You can also reach us by [employer]: [email or web address]    For refills, please call on Monday, 1 week before your medication runs out. The doctors are not always in clinic, so this gives us time to get your prescriptions ready.  Please let us know the name of the medication you are requesting a refill of.

## 2018-03-07 NOTE — MR AVS SNAPSHOT
After Visit Summary   3/7/2018    Ada Welch    MRN: [id number]           Patient Information     Date Of Birth          1970        Visit Information        Provider Department      3/7/2018 8:40 AM Faiza Martinez MD [facility]        Today's Diagnoses     Lumbar post-laminectomy syndrome          Care Instructions    1. Tramadol refilled for 15 tablets.      2. Start taking cymbalta 30mg daily at bedtime.     3. Xray of lumbar spine ordered.     IMAGING SERVICES HOURS:    All imaging modalities are available from 7 a.m. - 9 p.m. Monday through Friday  X-ray, CT, MRI, and General Ultrasound appointments are available from 7 a.m. -3:30 p.m. on Saturdays  X-ray, CT and MRI appointments are available from 8 a.m. - 4:30 p.m. on Sundays  Please call [phone] to schedule imaging exams      Follow up: 6 months or sooner if needed       To speak with a nurse, schedule/reschedule/cancel a clinic appointment, or request a medication refill call: [phone]     You can also reach us by [employer]: [email or web address]    For refills, please call on Monday, 1 week before your medication runs out. The doctors are not always in clinic, so this gives us time to get your prescriptions ready.  Please let us know the name of the medication you are requesting a refill of.                                     Follow-ups after your visit        Your next 10 appointments already scheduled     Mar 08, 2018  9:00 AM CST   (Arrive by 8:45 AM)   EMG with Rajan Snowden MD   [facility]G ([facility])    [address] [place] [id number] [phone]            Mar 08, 2018 12:15 PM CST   (Arrive by 12:00 PM)   New Patient Visit with Noemy Nguyen MD   [facility] Neurology ([facility])    [address] [place] [id number] [phone]             Apr 05, 2018  4:00 PM CDT   (Arrive by 3:45 PM)   Return Visit with Bertha Moncada MD   [facility] Neurosurgery ([facility])    [address] [facility] [id number] [phone]            Apr 19, 2018 10:55 AM CDT   (Arrive by 10:40 AM)   Return Visit with Ellyn Rivera MD   [facility] ([facility])    [address] [facility] [id number] [phone]            Jun 07, 2018  9:30 AM CDT   Ech Complete with UCECHCR1   [facility] Echo ([facility])    [address]  [address] [facility] [id number] [phone]           1. Please bring or wear a comfortable two-piece outfit. 2. You may eat, drink and take your normal medicines. 3. For any questions that cannot be answered, please contact the ordering physician            Jun 07, 2018 10:30 AM CDT   (Arrive by 10:15 AM)   Implantable Loop Recorder with Uc Cv Device 1   [facility] ([facility])    [address]  [address]  [facility] [id number] [phone]            Jun 07, 2018 11:00 AM CDT   (Arrive by 10:45 AM)   RETURN ARRHYTHMIA with FDIELINA Mora [facility] ([facility])    [address] [place] [address]  [address]  [facility] [id number] [phone]            Jul 11, 2018 11:15 AM CDT   (Arrive by 11:00 AM)   Return Visit with aWde Singh MD   [facility] for Prostate and Urologic Cancers ([facility])    [address] [facility] [id number] [phone]              Future tests that were ordered for you today     Open Future Orders        Priority Expected Expires Ordered    XR Lumbar Spine G/E 4 Views Routine 3/7/2018 3/7/2019 3/7/2018            Who to contact     Please call your clinic at [phone] to:    Ask questions about your  "health    Make or cancel appointments    Discuss your medicines    Learn about your test results    Speak to your doctor            Additional Information About Your Visit        [employer] Information     [employer] gives you secure access to your electronic health record. If you see a primary care provider, you can also send messages to your care team and make appointments. If you have questions, please call your primary care clinic.  If you do not have a primary care provider, please call [phone] and they will assist you.      [employer] is an electronic gateway that provides easy, online access to your medical records. With [employer], you can request a clinic appointment, read your test results, renew a prescription or communicate with your care team.     To access your existing account, please contact your [facility] or call [phone] for assistance.        Care EveryWhere ID     This is your Care EveryWhere ID. This could be used by other organizations to access your [place] medical records  [id number]        Your Vitals Were     Pulse Respirations Height BMI (Body Mass Index)          132 16 1.702 m (5' 7\") 30.85 kg/m2         Blood Pressure from Last 3 Encounters:   03/07/18 117/71   03/02/18 124/84   02/09/18 132/88    Weight from Last 3 Encounters:   03/07/18 89.4 kg (197 lb)   03/02/18 88.9 kg (196 lb)   02/09/18 90.5 kg (199 lb 9.6 oz)                 Today's Medication Changes          These changes are accurate as of 3/7/18  9:49 AM.  If you have any questions, ask your nurse or doctor.               Start taking these medicines.        Dose/Directions    DULoxetine 30 MG EC capsule   Commonly known as:  CYMBALTA   Used for:  Lumbar post-laminectomy syndrome   Started by:  Faiza Martinez MD        Dose:  30 mg   Take 1 capsule (30 mg) by mouth daily   Quantity:  60 capsule   Refills:  1            Where to get your medicines      These medications were sent to [facility] [address] " IN [facility] - [place] - [address]  [address] [place] [address]     Phone:  [phone]     DULoxetine 30 MG EC capsule         Some of these will need a paper prescription and others can be bought over the counter.  Ask your nurse if you have questions.     Bring a paper prescription for each of these medications     traMADol 50 MG tablet                Primary Care Provider Office Phone # Fax #    Ellyn Rivera [phone]       [address] [address]        Equal Access to Services     : Hadii aad ku hadasho Soomaali, waaxda luqadaha, qaybta kaalmada adeegyadonnell, odette dykes . So [facility] [phone].    ATENCIÓN: Si habla español, tiene a sweeney disposición servicios gratuitos de asistencia lingüística. [facility] [phone].    We comply with applicable federal civil rights laws and [place] laws. We do not discriminate on the basis of race, color, national origin, age, disability, sex, sexual orientation, or gender identity.            Thank you!     Thank you for choosing [facility] COMPREHENSIVE [facility] MANAGEMENT  for your care. Our goal is always to provide you with excellent care. Hearing back from our patients is one way we can continue to improve our services. Please take a few minutes to complete the written survey that you may receive in the mail after your visit with us. Thank you!             Your Updated Medication List - Protect others around you: Learn how to safely use, store and throw away your medicines at www.disposemymeds.org.          This list is accurate as of 3/7/18  9:49 AM.  Always use your most recent med list.                   Brand Name Dispense Instructions for use Diagnosis    ADVAIR DISKUS 250-50 MCG/DOSE diskus inhaler   Generic drug:  fluticasone-salmeterol      Inhale 1 puff into the lungs 2 times daily as needed        albuterol 108 (90 BASE) MCG/ACT Inhaler    PROAIR  HFA/PROVENTIL HFA/VENTOLIN HFA     Inhale 2 puffs into the lungs        amitriptyline 10 MG tablet    ELAVIL    270 tablet    Take 3 tablets (30 mg) by mouth At Bedtime    Chronic migraine without aura without status migrainosus, not intractable       buprenorphine 10 MCG/HR WK patch    BUTRANS    4 patch    Place 1 patch onto the skin every 7 days    Postlaminectomy syndrome of lumbar region       cetirizine 10 MG tablet    zyrTEC     Take 10 mg by mouth daily        COMPRESSION STOCKINGS     3 each    1 each daily 20-30 mmHg Thigh Length measure and fit, color and style per patient preference. Doff n jose per need.    Orthostatic hypotension       DULoxetine 30 MG EC capsule    CYMBALTA    60 capsule    Take 1 capsule (30 mg) by mouth daily    Lumbar post-laminectomy syndrome       eletriptan 20 MG tablet    RELPAX    18 tablet    Take 1 tablet (20 mg) by mouth at onset of headache for migraine May repeat in 2 hours. Max 4 tablets/24 hours.    Chronic daily headache, Intractable migraine without aura and with status migrainosus       EPINEPHrine 0.3 MG/0.3ML injection 2-pack    EPIPEN/ADRENACLICK/or ANY BX GENERIC EQUIV     Inject 0.3 mg into the muscle        famotidine 20 MG tablet    PEPCID    90 tablet    Take 1 tablet (20 mg) by mouth At Bedtime    Hiatal hernia, Mast cell disorder       fluticasone 50 MCG/ACT spray    FLONASE    1 Bottle    Spray 2 sprays into both nostrils daily    Chronic rhinitis, unspecified type       linaclotide 145 MCG capsule    LINZESS    30 capsule    Take 1 capsule (145 mcg) by mouth every morning (before breakfast) Last refill until GI clinic follow up.    Chronic idiopathic constipation       meloxicam 7.5 MG tablet    MOBIC    60 tablet    Take 1 tablet (7.5 mg) by mouth 2 times daily    Postlaminectomy syndrome       metoclopramide 5 MG tablet    REGLAN    20 tablet    Take 1 tablet (5 mg) by mouth every 6 hours as needed    Intractable chronic migraine without aura and with  status migrainosus       midodrine 5 MG tablet    PROAMATINE    180 tablet    Take 1 tablet (5 mg) by mouth 2 times daily Take 5mg in the morning and 5 mg 4-6 hours later. Do not take within 5 hours of laying down.    Orthostatic hypotension       MULTIVITAMIN PO      Take by mouth daily        omeprazole 20 MG CR capsule    priLOSEC    90 capsule    Take 1 capsule (20 mg) by mouth daily    Hiatal hernia       ondansetron 4 MG ODT tab    ZOFRAN-ODT    60 tablet    Take 1-2 tablets (4-8 mg) by mouth every 12 hours as needed for nausea    Migraine without status migrainosus, not intractable, unspecified migraine type       PROBIOTIC DAILY PO      Take by mouth 2 times daily 2 packets daily        SUMAtriptan 6 MG/0.5ML pen injector kit    IMITREX STATDOSE    2 kit    Inject 0.5 mLs (6 mg) Subcutaneous at onset of headache for migraine (may repeat once after 2 hrs) May repeat in 1 hour. Max 12 mg/24 hours.    Chronic migraine without aura without status migrainosus, not intractable       SYNTHROID 25 MCG tablet   Generic drug:  levothyroxine      Take 25 mcg by mouth daily        tiZANidine 4 MG tablet    ZANAFLEX    90 tablet    TAKE 2 TABLETS BY MOUTH 3 TIMES DAILY AS NEEDED FOR MUSCLE SPASMS    Post laminectomy syndrome       traMADol 50 MG tablet    ULTRAM    15 tablet    Take 1 tablet (50 mg) by mouth every 6 hours as needed for pain maximum 4 tablet(s) per day    Lumbar post-laminectomy syndrome       zonisamide 25 MG capsule    Zonegran    360 capsule    Take 125mg daily for 2 weeks, then 150mg    Occipital neuritis

## 2018-03-08 ASSESSMENT — ANXIETY QUESTIONNAIRES: GAD7 TOTAL SCORE: 1

## 2018-03-08 NOTE — PROGRESS NOTES
[facility] : Progress Note    Date of visit: 3/8/2018    Interval history:  Ada Welch is a 47 year old female with past medical history significant for Ehler Danlos syndrome is known to me for chronic  low back pain, which is mostly located at lower lumbar area. The patient has very complex history of low back pain for nearly 24 years for which she underwent L4-S1 fusion (TLIF) 12/09/2013.  Postoperatively her  back pain even became much worse than preop.  Mainly LBP, but also radiates down both legs S1 dermatome distribution.   She then had  removal of posterior instrumentation (screws and guadalupe) with exploration of fusion.  Fusion noted to already be solid.  NO significant relief afforded by this surgery. The pain has been progressively worsened, limiting the patient s activities, affecting mood euvolemic from a refill of the small very long for this to go to the share drive and should be used to emergency and sleep quality, and causing a decrease in the patient s quality of life.  She underwent multiple SUNSHINE's which were not helpful, and even made things worse.  Recently started aquatic based physical therapy. Previous EMG showed some L5 radiculopathy on left. Her pain significantly improved after we started her butrans patch. She continues to be more functional than before the butrans was prescribed. She is able to walk longer distances and do activities with less pain that before. She has numbness in her left 4th toe that has been constant since her surgery. She denies any significant side effects from the medications. She also denies any new bowel or bladder issues, new weakness or numbness.      Today she came for f/u visit.  The patient continues to have headache, neck pain, back pain, bilateral lower extremity pain.  Patient has seen neurologist Dr. Galeano.  She had an EMG study performed by Dr. Pickard, which showed an L5  "radiculopathy on the left.  A repeat EMG ordered by Dr. Galeano.  Dr. Galeano referred her to Dr. Noemy Nguyen, headache specialist.  During the last clinic visit we performed bilateral occipital nerve block, however she did not get  adequate relief.    She has also been seeing Dr. Singh  for persistent UTI.  She has been seeing Dr. Moncada for chronic bilateral lower extremity pain.  Per Dr. 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  No surgery required for her current symptomatology.  She has been on the stable dose of Butrans patch 10 mics per hour.  She reports that recently her pain symptoms increased for which she has been taking intermittently tramadol  50 mg, approximately 15-30 tablets a month.  She has been also on amitriptyline 30 mg nightly, acetaminophen as needed, and tizanidine 8 mg as needed.  Overall her pain symptoms are stable.  She has an upcoming trip to [place] and she is excited about that.        Recommendations/plan at the last visit included:  - Interventions: Will schedule her for occipital blocks.  SUNSHINE's and SCS and other options have been discussed with her but this would be a last resort as she feels her body \"rejects foreign objects\". Discussed about Botox injection for migraine headache if conservative therapies fail.   - Medications:  getting excellent benefit with butrans at this time and is on a stable dose.    -  Continue tizanidine 8mg to help with her muscle spasms.  Continue ibuprofen and ACETAMINOPHEN prn.  - Take Amitriptyline 30 mg at bedtime.  - Physical Therapy:  Continue as scheduled - she also does aquatic therapy - she will continue her HEP  - Psychology/Coping skills: continue therapy in [place]  - Diagnostic Studies:  None today     Follow up: 3 months      [place] Prescription Monitoring Program:   Reviewed. No concerns    Review of Systems:  The 14 system ROS was reviewed and was negative except what " "is documented above and as follows.  Any bowel or bladder problems: none  Mood: okay    Physical Exam:  Vitals:    03/07/18 0905   BP: 117/71   Pulse: 132   Resp: 16   Weight: 89.4 kg (197 lb)   Height: 1.702 m (5' 7\")       General: Awake in no apparent distress.   Eyes: Sclerae are anicteric. PERRLA, EOMI   Neck: supple, no masses.   Lungs: unlabored.   Heart: regular rate and rhythm   Abdomen: soft non tender.  Extremities: Pulses are well palpable, no peripheral edema.   Musculoskeletal: 5/5 muscle strength in all extremities.   Neurologic exam:Sensation intact throughout all dermatomes bilateral upper extremities and lower extremities  Psychiatric; Normal affect.   Skin: Warm and Dry.    Medications:  Current Outpatient Prescriptions   Medication Sig Dispense Refill     traMADol (ULTRAM) 50 MG tablet Take 1 tablet (50 mg) by mouth every 6 hours as needed for pain maximum 4 tablet(s) per day 15 tablet 0     DULoxetine (CYMBALTA) 30 MG EC capsule Take 1 capsule (30 mg) by mouth daily 60 capsule 1     linaclotide (LINZESS) 145 MCG capsule Take 1 capsule (145 mcg) by mouth every morning (before breakfast) Last refill until GI clinic follow up. 30 capsule 1     midodrine (PROAMATINE) 5 MG tablet Take 1 tablet (5 mg) by mouth 2 times daily Take 5mg in the morning and 5 mg 4-6 hours later. Do not take within 5 hours of laying down. 180 tablet 3     buprenorphine (BUTRANS) 10 MCG/HR WK patch Place 1 patch onto the skin every 7 days 4 patch 1     amitriptyline (ELAVIL) 10 MG tablet Take 3 tablets (30 mg) by mouth At Bedtime 270 tablet 3     meloxicam (MOBIC) 7.5 MG tablet Take 1 tablet (7.5 mg) by mouth 2 times daily 60 tablet 1     fluticasone (FLONASE) 50 MCG/ACT spray Spray 2 sprays into both nostrils daily 1 Bottle 3     tiZANidine (ZANAFLEX) 4 MG tablet TAKE 2 TABLETS BY MOUTH 3 TIMES DAILY AS NEEDED FOR MUSCLE SPASMS 90 tablet 3     zonisamide (ZONEGRAN) 25 MG capsule Take 125mg daily for 2 weeks, then 150mg 360 " capsule 3     eletriptan (RELPAX) 20 MG tablet Take 1 tablet (20 mg) by mouth at onset of headache for migraine May repeat in 2 hours. Max 4 tablets/24 hours. 18 tablet 1     levothyroxine (SYNTHROID) 25 MCG tablet Take 25 mcg by mouth daily       famotidine (PEPCID) 20 MG tablet Take 1 tablet (20 mg) by mouth At Bedtime 90 tablet 1     omeprazole (PRILOSEC) 20 MG CR capsule Take 1 capsule (20 mg) by mouth daily 90 capsule 1     ondansetron (ZOFRAN-ODT) 4 MG ODT tab Take 1-2 tablets (4-8 mg) by mouth every 12 hours as needed for nausea 60 tablet 1     metoclopramide (REGLAN) 5 MG tablet Take 1 tablet (5 mg) by mouth every 6 hours as needed 20 tablet 1     EPINEPHrine (EPIPEN/ADRENACLICK/OR ANY BX GENERIC EQUIV) 0.3 MG/0.3ML injection 2-pack Inject 0.3 mg into the muscle       albuterol (PROAIR HFA/PROVENTIL HFA/VENTOLIN HFA) 108 (90 BASE) MCG/ACT Inhaler Inhale 2 puffs into the lungs       COMPRESSION STOCKINGS 1 each daily 20-30 mmHg Thigh Length measure and fit, color and style per patient preference. Doff n jose per need. 3 each 3     SUMAtriptan (IMITREX STATDOSE) 6 MG/0.5ML pen injector kit Inject 0.5 mLs (6 mg) Subcutaneous at onset of headache for migraine (may repeat once after 2 hrs) May repeat in 1 hour. Max 12 mg/24 hours. 2 kit 1     fluticasone-salmeterol (ADVAIR DISKUS) 250-50 MCG/DOSE diskus inhaler Inhale 1 puff into the lungs 2 times daily as needed        cetirizine (ZYRTEC) 10 MG tablet Take 10 mg by mouth daily        Multiple Vitamins-Minerals (MULTIVITAMIN PO) Take by mouth daily       Probiotic Product (PROBIOTIC DAILY PO) Take by mouth 2 times daily 2 packets daily           LABORATORY VALUES:   Recent Labs   Lab Test  03/02/18   1340  10/20/17   1150   NA  138  138   POTASSIUM  3.8  3.7   CHLORIDE  105  107   CO2  24  23   ANIONGAP  9  8   GLC  89  108*   BUN  9  10   CR  0.88  0.78   JUAN  8.9  8.4*       CBC RESULTS:   Recent Labs   Lab Test  10/20/17   1150   WBC  6.1   RBC  4.29   HGB   "13.4   HCT  38.8   MCV  90   MCH  31.2   MCHC  34.5   RDW  12.5   PLT  216       Most Recent 3 INR's:  Recent Labs   Lab Test  07/25/17   1249   INR  1.01       Diagnostic tests:            ASSESSMENT:  Chronic low back pain radiating to bilateral lower extremity  Persistent headache  Chronic neck pain  Chronic opioid therapy  Clinical diagnosis of Tom-Danlos syndrome  Chronic UTI  EMG finding of lower extremity radiculopathy  Bilateral lumbar facet arthritis  Anxiety disorder  Chronic pain syndrome  Post laminectomy pain syndrome      PLAN:    -  Interventions: None at this time. SUNSHINE's and SCS  options have been discussed with her but this would be a last resort as she feels her body \"rejects foreign objects\".   -  Medications:  getting excellent benefit with butrans at this time and is on a stable dose.    -  Continue tizanidine 8mg to help with her muscle spasms.  Continue ibuprofen and ACETAMINOPHEN prn.  - Take Amitriptyline 30 mg at bedtime.  - Take Butrans patch 10 mics per hour.  Change patch every 7 days.  Discussed about increasing the patch to 15 mics per hour.  - Meloxicam 7.5 mg twice daily  -Continue to take tramadol 50 mg every 6 hours as needed.  15 tablets dispensed today.  -Start taking Cymbalta 30 mg daily.  The dose may be increased to 60 mg nightly and 30 mg in the morning.  - Physical Therapy:  Continue as scheduled - she also does aquatic therapy - she will continue her HEP  - Psychology/Coping skills: continue therapy.   - Diagnostic Studies:  Will order  flexion-extension lumbar spine x-rays to diagnose the instability of the spine.     Follow up: 6 months        Assessment will be ongoing with changes in treatment as indicated.  Benefits/risks/alternatives to treatment have been reviewed and the patient has been instructed to contact this office if they have any questions or concerns.  This plan of care has been discussed with the patient and the patient is in agreement.     Faiza Martinez, " MD, PHD

## 2018-03-09 DIAGNOSIS — M96.1 POSTLAMINECTOMY SYNDROME: ICD-10-CM

## 2018-03-09 RX ORDER — MELOXICAM 7.5 MG/1
7.5 TABLET ORAL
Qty: 60 TABLET | Refills: 1 | Status: SHIPPED | OUTPATIENT
Start: 2018-03-09 | End: 2018-04-19

## 2018-03-11 DIAGNOSIS — M96.1 POST LAMINECTOMY SYNDROME: ICD-10-CM

## 2018-03-12 DIAGNOSIS — M96.1 LUMBAR POST-LAMINECTOMY SYNDROME: ICD-10-CM

## 2018-03-20 DIAGNOSIS — M96.1 POSTLAMINECTOMY SYNDROME OF LUMBAR REGION: ICD-10-CM

## 2018-03-20 DIAGNOSIS — M96.1 POST LAMINECTOMY SYNDROME: ICD-10-CM

## 2018-03-20 RX ORDER — BUPRENORPHINE 10 UG/H
1 PATCH TRANSDERMAL
Qty: 4 PATCH | Refills: 1 | Status: SHIPPED | OUTPATIENT
Start: 2018-03-20 | End: 2018-03-20

## 2018-03-20 RX ORDER — BUPRENORPHINE 10 UG/H
1 PATCH TRANSDERMAL
Qty: 4 PATCH | Refills: 0 | Status: SHIPPED | OUTPATIENT
Start: 2018-03-20 | End: 2018-04-16

## 2018-03-20 NOTE — TELEPHONE ENCOUNTER
Verbal orders given by Dr. Martinez for 1 refill of butrans patch.  Dr. Juan aguillon for prescription to be called into pharmacy in [place] due to pt being out of town.     Jessica De Souza, RN, BSN

## 2018-04-01 DIAGNOSIS — K44.9 HIATAL HERNIA: ICD-10-CM

## 2018-04-02 NOTE — TELEPHONE ENCOUNTER
Last Clinic Visit: 12-15-17    HCG Quantitative Serum <1  0 - 5 IU/L Final 10/20/2017      Test is not positive.

## 2018-04-05 ENCOUNTER — OFFICE VISIT (OUTPATIENT)
Dept: NEUROLOGY | Facility: CLINIC | Age: 48
End: 2018-04-05
Payer: MEDICARE

## 2018-04-05 DIAGNOSIS — M79.604 LEG PAIN, BILATERAL: Primary | ICD-10-CM

## 2018-04-05 DIAGNOSIS — M79.605 LEG PAIN, BILATERAL: Primary | ICD-10-CM

## 2018-04-05 NOTE — LETTER
4/5/2018       RE: Ada Welch  [address] [place] [id number]     Dear Colleague,    Thank you for referring your patient, Ada Welch, to the [facility] EMG at [facility]. Please see a copy of my visit note below.        [facility]  Electrodiagnostic Laboratory      Nerve Conduction & EMG Report          Patient:       Ada Welch  Patient ID:    [id number]  Gender:        Female  YOB: 1970  Age:           47 Years 3 Months      History & Examination: 47-year-old female with chronic low back pain and leg pain.  Status post prior lumbar procedures.  Dr. Galeano requests bilateral leg examinations looking for lumbar radiculopathy.  The patient also had a history of an abnormal cervical EMG with an indurated discharge termed pseudo-myotonia.    Techniques:  Motor conduction studies were done with surface recording electrodes. Sensory conduction studies were done with subdermal needle recording electrodes. EMG was done with a concentric needle electrode.     Results: Sensory nerve conduction studies performed on both legs, including medial plantar conductions, were normal.  Motor nerve conduction studies performed on both legs were also normal.  Extensive needle exam of both legs was normal.  The patient could not activate the right medial gastrocnemius but no signs of denervation were observed.  We did examine the thoracic paraspinal muscle and no myotonia was seen in this muscle.    Interpretation: The EMG is normal.  There is no EMG evidence for a left or right lumbar/sacral radiculopathy.  There is no evidence for a generalized myotonic disorder.    EMG Physician: Rajan Snowden MD         Sensory NCS      Nerve / Sites Rec. Site Onset Peak NP Amp Ref. PP Amp Dist Donnell Ref. Temp     ms ms  V  V  V cm m/s m/s  C   L SURAL - Lat Mall      Calf Ankle 2.55 3.33 8.4 8.0 8.4 14 54.9 38.0 36.3   R SURAL - Lat Mall      Calf Ankle 3.02  3.80 8.1 8.0 6.7 14 46.3 38.0 30.6   L SUP PERONEAL      Lat Leg Carrillo 2.45 3.44 6.6  6.7 12.5 51.1 38.0 32.5   L MEDIAL PLANTAR      Sole Med Mall 1.98 2.76 5.9  8.6 13 65.7  31.1   R MEDIAL PLANTAR      Sole Med Mall 2.29 3.13 8.6  14.1 14 61.1  31.1       Motor NCS      Nerve / Sites Rec. Site Lat Ref. Amp Ref. Rel Amp Dist Donenll Ref. Dur. Area Temp.     ms ms mV mV % cm m/s m/s ms %  C   L DEEP PERONEAL - EDB      Ankle EDB 5.10 6.00 4.9 2.5 100 8   6.41 100 31.1      FibHead EDB 11.41  4.5  90.4 31 49.2 38.0 7.34 90.3 31.1      Pop Fos EDB 13.13  4.2  86.1 8 46.5 38.0 7.45 87.3 31.1   R DEEP PERONEAL - EDB      Ankle EDB 4.90 6.00 5.5 2.5 100 8   6.09 100 31.1   L TIBIAL - AH      Ankle AH 3.18 6.00 15.0 4.0 100 8   8.07 100 31.1      Pop Fos AH 11.61  8.9  59.7 41 48.6 38.0 9.58 93.9 31.1   R TIBIAL - AH      Ankle AH 3.80 6.00 11.3 4.0 100 8   6.88 100 31.1       F  Wave      Nerve Min F Lat Max F Lat Mean FLat Temp.    ms ms ms  C   L TIBIAL 51.25 54.06 52.73 28.5       Needle EMG (W)      EMG Summary Table     Spontaneous MUAP Recruitment    IA Fib PSW Fasc H.F. Amp Dur. PPP Pattern   R. TIB ANTERIOR N None None None None N N N N   L. TIB ANTERIOR N None None None None N N N N   R. GASTROCN (MED) N None None None None    No Units-can't activate   R. GASTROCN (LAT) N None None None None N N N N   L. GASTROCN (LAT) N None None None None N N N N   R. TIB POSTERIOR N None None None None N N N N   L. TIB POSTERIOR N None None None None N N N N   R. VAST LATERALIS N None None None None N N N N   L. VAST LATERALIS N None None None None N N N N   R. T FASCIA KATHIA N None None None None N N N N   R. GLUTEUS MAX N None None None None N N N N   L. GLUTEUS MAX N None None None None N N N N   R. THOR PSP (M) N None None None None    No myotonia                                Again, thank you for allowing me to participate in the care of your patient.      Sincerely,    Rajan Snowden MD

## 2018-04-05 NOTE — PROGRESS NOTES
[facility]  Electrodiagnostic Laboratory      Nerve Conduction & EMG Report          Patient:       Ada Welch  Patient ID:    [id number]  Gender:        Female  YOB: 1970  Age:           47 Years 3 Months      History & Examination: 47-year-old female with chronic low back pain and leg pain.  Status post prior lumbar procedures.  Dr. Galeano requests bilateral leg examinations looking for lumbar radiculopathy.  The patient also had a history of an abnormal cervical EMG with aniterative discharge termed pseudo-myotonia.    Techniques:  Motor conduction studies were done with surface recording electrodes. Sensory conduction studies were done with subdermal needle recording electrodes. EMG was done with a concentric needle electrode.     Results: Sensory nerve conduction studies performed on both legs, including medial plantar conductions, were normal.  Motor nerve conduction studies performed on both legs were also normal.  Extensive needle exam of both legs was normal.  The patient could not activate the right medial gastrocnemius but no signs of denervation were observed.  We did examine the thoracic paraspinal muscle and no myotonia was seen in this muscle.    Interpretation: The EMG is normal.  There is no EMG evidence for a left or right lumbar/sacral radiculopathy.  There is no evidence for a generalized myotonic disorder.    EMG Physician: Rajan Snowden MD         Sensory NCS      Nerve / Sites Rec. Site Onset Peak NP Amp Ref. PP Amp Dist Donnell Ref. Temp     ms ms  V  V  V cm m/s m/s  C   L SURAL - Lat Mall      Calf Ankle 2.55 3.33 8.4 8.0 8.4 14 54.9 38.0 36.3   R SURAL - Lat Mall      Calf Ankle 3.02 3.80 8.1 8.0 6.7 14 46.3 38.0 30.6   L SUP PERONEAL      Lat Leg Carrillo 2.45 3.44 6.6  6.7 12.5 51.1 38.0 32.5   L MEDIAL PLANTAR      Sole Med Mall 1.98 2.76 5.9  8.6 13 65.7  31.1   R MEDIAL PLANTAR      Sole Med Mall 2.29 3.13 8.6  14.1 14 61.1  31.1       Motor NCS       Nerve / Sites Rec. Site Lat Ref. Amp Ref. Rel Amp Dist Donnell Ref. Dur. Area Temp.     ms ms mV mV % cm m/s m/s ms %  C   L DEEP PERONEAL - EDB      Ankle EDB 5.10 6.00 4.9 2.5 100 8   6.41 100 31.1      FibHead EDB 11.41  4.5  90.4 31 49.2 38.0 7.34 90.3 31.1      Pop Fos EDB 13.13  4.2  86.1 8 46.5 38.0 7.45 87.3 31.1   R DEEP PERONEAL - EDB      Ankle EDB 4.90 6.00 5.5 2.5 100 8   6.09 100 31.1   L TIBIAL - AH      Ankle AH 3.18 6.00 15.0 4.0 100 8   8.07 100 31.1      Pop Fos AH 11.61  8.9  59.7 41 48.6 38.0 9.58 93.9 31.1   R TIBIAL - AH      Ankle AH 3.80 6.00 11.3 4.0 100 8   6.88 100 31.1       F  Wave      Nerve Min F Lat Max F Lat Mean FLat Temp.    ms ms ms  C   L TIBIAL 51.25 54.06 52.73 28.5       Needle EMG (W)      EMG Summary Table     Spontaneous MUAP Recruitment    IA Fib PSW Fasc H.F. Amp Dur. PPP Pattern   R. TIB ANTERIOR N None None None None N N N N   L. TIB ANTERIOR N None None None None N N N N   R. GASTROCN (MED) N None None None None    No Units-can't activate   R. GASTROCN (LAT) N None None None None N N N N   L. GASTROCN (LAT) N None None None None N N N N   R. TIB POSTERIOR N None None None None N N N N   L. TIB POSTERIOR N None None None None N N N N   R. VAST LATERALIS N None None None None N N N N   L. VAST LATERALIS N None None None None N N N N   R. T FASCIA KATHIA N None None None None N N N N   R. GLUTEUS MAX N None None None None N N N N   L. GLUTEUS MAX N None None None None N N N N   R. THOR PSP (M) N None None None None    No myotonia

## 2018-04-05 NOTE — MR AVS SNAPSHOT
After Visit Summary   4/5/2018    Ada Welch    MRN: [id number]           Patient Information     Date Of Birth          1970        Visit Information        Provider Department      4/5/2018 10:00 AM Rajan Snowden MD [facility]        Today's Diagnoses     Leg pain, bilateral    -  1       Follow-ups after your visit        Your next 10 appointments already scheduled     Apr 19, 2018 10:55 AM CDT   (Arrive by 10:40 AM)   Return Visit with Ellyn Rivera MD   [facility] ([facility])    [address] [place] [address]  4th [facility] [address]   [phone]            Apr 19, 2018  4:30 PM CDT   (Arrive by 4:15 PM)   Return Visit with Bertha Moncada MD   [facility] ([facility])    [address] [place] [id number]   [phone]            May 01, 2018 10:00 AM CDT   (Arrive by 9:45 AM)   New Patient Visit with Noemy Nguyen MD   [facility] ([facility])    [address] [place] [address]   [phone]            Jun 07, 2018  9:30 AM CDT   Ech Complete with [id number]   [facility] ([facility])    [address] [place] [id number] [phone]           1. Please bring or wear a comfortable two-piece outfit. 2. You may eat, drink and take your normal medicines. 3. For any questions that cannot be answered, please contact the ordering physician            Jun 07, 2018 10:30 AM CDT   (Arrive by 10:15 AM)   Implantable Loop Recorder with Uc Cv Device 1   [facility] ([facility])    [address] [place] [id number] [phone]            Jun 07, 2018 11:00 AM CDT   (Arrive by 10:45 AM)   RETURN ARRHYTHMIA with FIDELINA Mora [facility] ([facility])    [address]  [address]  [facility] [id number] [phone]            Jul 11, 2018 11:15 AM CDT   (Arrive by 11:00 AM)   Return Visit with Wade Singh MD   [facility] ([facility])    [address]  4th Floor  [facility] [id number] [phone]              Future tests that were ordered for you today     Open Future Orders        Priority Expected Expires Ordered    HC NCS MOTOR W OR W/O F-WAVE, 9 OR 10 Routine  4/5/2019 4/5/2018    Needle EMG Each Extremity w/Paraspinal Area Complete (90343) Routine  4/5/2019 4/5/2018    EMG - Needle EMG of Cranial Nerve Unilateral (90464) Routine  4/5/2019 4/5/2018            Who to contact     Please call your clinic at [phone] to:    Ask questions about your health    Make or cancel appointments    Discuss your medicines    Learn about your test results    Speak to your doctor            Additional Information About Your Visit        Gu[employer] Information     [employer] gives you secure access to your electronic health record. If you see a primary care provider, you can also send messages to your care team and make appointments. If you have questions, please call your primary care clinic.  If you do not have a primary care provider, please call [phone] and they will assist you.      [employer]us is an electronic gateway that provides easy, online access to your medical records. With [employer], you can request a clinic appointment, read your test results, renew a prescription or communicate with your care team.     To access your existing account, please contact your [facility] or call [phone] for assistance.        Care EveryWhere ID     This is your Care EveryWhere ID. This could be used by other organizations to access your [facility] medical records  [id number]         Blood Pressure from Last 3 Encounters:   03/07/18  117/71   03/02/18 124/84   02/09/18 132/88    Weight from Last 3 Encounters:   03/07/18 89.4 kg (197 lb)   03/02/18 88.9 kg (196 lb)   02/09/18 90.5 kg (199 lb 9.6 oz)               Primary Care Provider Office Phone # Fax #    LenoxDenise Rivera [phone] [address] [address]        Equal Access to Services     CHRISTINA GUY : Hadii aad ku hadasho Soomaali, waaxda luqadaha, qaybta kaalmada adeegyada, waxay idiin hayaan pawan dykes . So [facility] [phone].    ATENCIÓN: Si habla español, tiene a sweeney disposición servicios gratuitos de asistencia lingüística. [facility] [phone].    We comply with applicable federal civil rights laws and [place] laws. We do not discriminate on the basis of race, color, national origin, age, disability, sex, sexual orientation, or gender identity.            Thank you!     Thank you for choosing [facility]  for your care. Our goal is always to provide you with excellent care. Hearing back from our patients is one way we can continue to improve our services. Please take a few minutes to complete the written survey that you may receive in the mail after your visit with us. Thank you!             Your Updated Medication List - Protect others around you: Learn how to safely use, store and throw away your medicines at www.disposemymeds.org.          This list is accurate as of 4/5/18 11:08 AM.  Always use your most recent med list.                   Brand Name Dispense Instructions for use Diagnosis    ADVAIR DISKUS 250-50 MCG/DOSE diskus inhaler   Generic drug:  fluticasone-salmeterol      Inhale 1 puff into the lungs 2 times daily as needed        albuterol 108 (90 BASE) MCG/ACT Inhaler    PROAIR HFA/PROVENTIL HFA/VENTOLIN HFA     Inhale 2 puffs into the lungs        amitriptyline 10 MG tablet    ELAVIL    270 tablet    Take 3 tablets (30 mg) by mouth At Bedtime    Chronic migraine without aura without status migrainosus, not  intractable       buprenorphine 10 MCG/HR WK patch    BUTRANS    4 patch    Place 1 patch onto the skin every 7 days    Postlaminectomy syndrome of lumbar region       cetirizine 10 MG tablet    zyrTEC     Take 10 mg by mouth daily        COMPRESSION STOCKINGS     3 each    1 each daily 20-30 mmHg Thigh Length measure and fit, color and style per patient preference. Antolin garcia per need.    Orthostatic hypotension       DULoxetine 30 MG EC capsule    CYMBALTA    60 capsule    Take 1 capsule (30 mg) by mouth daily    Lumbar post-laminectomy syndrome       eletriptan 20 MG tablet    RELPAX    18 tablet    Take 1 tablet (20 mg) by mouth at onset of headache for migraine May repeat in 2 hours. Max 4 tablets/24 hours.    Chronic daily headache, Intractable migraine without aura and with status migrainosus       EPINEPHrine 0.3 MG/0.3ML injection 2-pack    EPIPEN/ADRENACLICK/or ANY BX GENERIC EQUIV     Inject 0.3 mg into the muscle        famotidine 20 MG tablet    PEPCID    90 tablet    Take 1 tablet (20 mg) by mouth At Bedtime    Hiatal hernia, Mast cell disorder       fluticasone 50 MCG/ACT spray    FLONASE    1 Bottle    Spray 2 sprays into both nostrils daily    Chronic rhinitis, unspecified type       linaclotide 145 MCG capsule    LINZESS    30 capsule    Take 1 capsule (145 mcg) by mouth every morning (before breakfast) Last refill until GI clinic follow up.    Chronic idiopathic constipation       meloxicam 7.5 MG tablet    MOBIC    60 tablet    Take 1 tablet (7.5 mg) by mouth 2 times daily    Postlaminectomy syndrome       metoclopramide 5 MG tablet    REGLAN    20 tablet    Take 1 tablet (5 mg) by mouth every 6 hours as needed    Intractable chronic migraine without aura and with status migrainosus       midodrine 5 MG tablet    PROAMATINE    180 tablet    Take 1 tablet (5 mg) by mouth 2 times daily Take 5mg in the morning and 5 mg 4-6 hours later. Do not take within 5 hours of laying down.    Orthostatic  hypotension       MULTIVITAMIN PO      Take by mouth daily        omeprazole 20 MG CR capsule    priLOSEC    90 capsule    Take 1 capsule (20 mg) by mouth daily    Hiatal hernia       ondansetron 4 MG ODT tab    ZOFRAN-ODT    60 tablet    Take 1-2 tablets (4-8 mg) by mouth every 12 hours as needed for nausea    Migraine without status migrainosus, not intractable, unspecified migraine type       PROBIOTIC DAILY PO      Take by mouth 2 times daily 2 packets daily        SUMAtriptan 6 MG/0.5ML pen injector kit    IMITREX STATDOSE    2 kit    Inject 0.5 mLs (6 mg) Subcutaneous at onset of headache for migraine (may repeat once after 2 hrs) May repeat in 1 hour. Max 12 mg/24 hours.    Chronic migraine without aura without status migrainosus, not intractable       SYNTHROID 25 MCG tablet   Generic drug:  levothyroxine      Take 25 mcg by mouth daily        tiZANidine 4 MG tablet    ZANAFLEX    90 tablet    Take 1 tablet (4 mg) by mouth 3 times daily as needed for muscle spasms    Post laminectomy syndrome       traMADol 50 MG tablet    ULTRAM    15 tablet    Take 1 tablet (50 mg) by mouth every 6 hours as needed for pain maximum 4 tablet(s) per day    Lumbar post-laminectomy syndrome       zonisamide 25 MG capsule    Zonegran    360 capsule    Take 125mg daily for 2 weeks, then 150mg    Occipital neuritis

## 2018-04-16 DIAGNOSIS — M54.16 LUMBAR RADICULOPATHY: Primary | ICD-10-CM

## 2018-04-16 DIAGNOSIS — M96.1 POSTLAMINECTOMY SYNDROME OF LUMBAR REGION: ICD-10-CM

## 2018-04-16 DIAGNOSIS — M96.1 POST LAMINECTOMY SYNDROME: ICD-10-CM

## 2018-04-16 RX ORDER — BUPRENORPHINE 10 UG/H
1 PATCH TRANSDERMAL
Qty: 4 PATCH | Refills: 1 | Status: SHIPPED | OUTPATIENT
Start: 2018-04-16 | End: 2018-05-14

## 2018-04-16 NOTE — TELEPHONE ENCOUNTER
Refill request    Medication: buprenorphine (BUTRANS) 10 MCG/HR WK patch  Place 1 patch onto the skin every 7 days      MNPMP Checked: Yes    buprenorphine (BUTRANS) 10 MCG/HR WK patch - Last refilled in [place] 3/20/18.      Refilled: YES, dated to be refilled 30 days since last refill    Last clinic appointment:3/7/18  Next clinic appointment:TBD    Patient requested to:      Sent to pharmacy

## 2018-04-18 NOTE — PROGRESS NOTES
[facility]  Primary Care Center   Ellyn Rivera MD  04/18/2018      Chief Complaint:   No chief complaint on file.       History of Present Illness:   Ada Welch is a 47 year old female with a complicated history including Ehler's Danlos syndrome type III, migraines, chronic back pain, tachycardia, autonomic dysfunction, urinary incontinence who presents for a 4 month follow up visit.    I last evaluated her on 12/15/17, at which time she was having chronic headaches and spine issues. She described a pressure-type pain in her occipital scalp and base of her neck, with a burning sensation that radiates from her neck into her jaw, and at times, burning and pain radiation into her shoulders, upper back, and head, with limited range of motion in her neck. She was taking medical cannabis capsules 4X/day, which she found helpful for her pain and sleep difficulties. In the interim, she has seen Neurology who referred her to an outside HA specialist. She also had a trial of occipital blocks.  She continues to follow in Pain Clinic. She saw Cardiology for autonomic instability.    She has ups and downs, some days are better than others. Lately she reports worsening of baseline symptoms including feeling nauseated, dizzy, weak, sweats through hair and gets the chills while putting make up on or with a little activity. She has had sweating though her hair on and off in her 30's, but it is happening more often now. After that happens, she gets very dehydrated. For this reason, she always has water with her. With the dizziness, nausea, and weakness, she has had more lows in her BP. They put her on Mododrine which she must take 20 minutes before she knows she will be up for 5 hours. She does not know when this is, because she must nap occasionally throughout the day. She has chronic fatigue and never feels rested. She does not eat due to a poor appetite, and has watery diarrhea, but denies changes to her diet. She eats  hamburgers, potatoes, and carrots for dinner. She eats a sweet and salty bar for breakfast, and peanut butter and jelly toast. Her temperatures range from , but 1.5 week ago she had a fever of 102.  Her heart rate ranges from . She denies sick contacts.     She was supposed to see a pulmonologist at [facility] for pulmonary nodules, wheezing, and difficulty inhaling. She was recommended to follow with them once per year to do tests, because her oxygen saturation was going low.    Other concerns discussed:  She has 2 lymph nodes, one larger than the other. There did a biopsy which was reportedly benign. They do not want to take it out, because it may put more stress on the rest of the lymphatic system.    Her menstrual cycles have been regular.   For 1 month, she has been on Cymbalta, as other NSAIDS did not help her arthritis    Previous Documentation:  Ehler's Danlos syndrome: Type 3/hypermobility type. 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 He reports having a Beighton score of 7-8 out of 9. She reports having genetic testing done, which was questionable for the MYLK Gene. She follows with a rheumatologist who is an EDS specialist in [place]. He is apparently concerned about some sort of Chiari malformation in the context of her head and neck pain and will be flying to [facility] to visit a special neurosurgeon Dr. Jesus Alberto Sofia. She also struggled with low back pain for many years. She underwent L4 to S1 fusion in 2013 followed by removal of instrumentation, which did not improve her symptoms. She met with Dr. Martinez in the pain clinic and was taken off high doses of Dilaudid and started on Suboxone, which has dramatically improved her symptoms.  She continues to work with physical therapy twice a week as well as other complementary therapies.      Headaches: She does have a history of migraine headaches, as well as a headache which started last week and has remained fairly constant. She reports  it started in the back of her head bilaterally approximately 10 days ago. It has waxed and waned in intensity since this time.She has used only over-the-counter medications thus far. She has some associated nausea. She has no other neurologic changes acutely. He denies any recent changes in medications sleep stress activity or caffeine.      Other issues that she struggles with include chronic tachycardia, for which she has a loop recorder implanted. She describes asthma as well as pulmonary nodules. She has a hiatal hernia as well as chronic sinus issues. She reports some autonomic issues. She apparently has a enlarged lymph node in the cervical region, which was biopsied and found to be benign, under active surveillance. She has chronic urge incontinence as well as incomplete bladder emptying.         Review of Systems:   Pertinent items are noted in HPI, remainder of complete ROS is negative.      Active Medications:     Current Outpatient Prescriptions:      albuterol (PROAIR HFA/PROVENTIL HFA/VENTOLIN HFA) 108 (90 BASE) MCG/ACT Inhaler, Inhale 2 puffs into the lungs, Disp: , Rfl:      amitriptyline (ELAVIL) 10 MG tablet, Take 3 tablets (30 mg) by mouth At Bedtime, Disp: 270 tablet, Rfl: 3     buprenorphine (BUTRANS) 10 MCG/HR WK patch, Place 1 patch onto the skin every 7 days, Disp: 4 patch, Rfl: 1     cetirizine (ZYRTEC) 10 MG tablet, Take 10 mg by mouth daily , Disp: , Rfl:      COMPRESSION STOCKINGS, 1 each daily 20-30 mmHg Thigh Length measure and fit, color and style per patient preference. Antolin garcia per need., Disp: 3 each, Rfl: 3     DULoxetine (CYMBALTA) 30 MG EC capsule, Take 1 capsule (30 mg) by mouth daily, Disp: 60 capsule, Rfl: 1     eletriptan (RELPAX) 20 MG tablet, Take 1 tablet (20 mg) by mouth at onset of headache for migraine May repeat in 2 hours. Max 4 tablets/24 hours., Disp: 18 tablet, Rfl: 1     EPINEPHrine (EPIPEN/ADRENACLICK/OR ANY BX GENERIC EQUIV) 0.3 MG/0.3ML injection 2-pack,  Inject 0.3 mg into the muscle, Disp: , Rfl:      famotidine (PEPCID) 20 MG tablet, Take 1 tablet (20 mg) by mouth At Bedtime, Disp: 90 tablet, Rfl: 1     fluticasone (FLONASE) 50 MCG/ACT spray, Spray 2 sprays into both nostrils daily, Disp: 1 Bottle, Rfl: 3     fluticasone-salmeterol (ADVAIR DISKUS) 250-50 MCG/DOSE diskus inhaler, Inhale 1 puff into the lungs 2 times daily as needed , Disp: , Rfl:      levothyroxine (SYNTHROID) 25 MCG tablet, Take 25 mcg by mouth daily, Disp: , Rfl:      linaclotide (LINZESS) 145 MCG capsule, Take 1 capsule (145 mcg) by mouth every morning (before breakfast) Last refill until GI clinic follow up., Disp: 30 capsule, Rfl: 1     meloxicam (MOBIC) 7.5 MG tablet, Take 1 tablet (7.5 mg) by mouth 2 times daily, Disp: 60 tablet, Rfl: 1     metoclopramide (REGLAN) 5 MG tablet, Take 1 tablet (5 mg) by mouth every 6 hours as needed, Disp: 20 tablet, Rfl: 1     midodrine (PROAMATINE) 5 MG tablet, Take 1 tablet (5 mg) by mouth 2 times daily Take 5mg in the morning and 5 mg 4-6 hours later. Do not take within 5 hours of laying down., Disp: 180 tablet, Rfl: 3     Multiple Vitamins-Minerals (MULTIVITAMIN PO), Take by mouth daily, Disp: , Rfl:      omeprazole (PRILOSEC) 20 MG CR capsule, Take 1 capsule (20 mg) by mouth daily, Disp: 90 capsule, Rfl: 2     ondansetron (ZOFRAN-ODT) 4 MG ODT tab, Take 1-2 tablets (4-8 mg) by mouth every 12 hours as needed for nausea, Disp: 60 tablet, Rfl: 1     Probiotic Product (PROBIOTIC DAILY PO), Take by mouth 2 times daily 2 packets daily, Disp: , Rfl:      SUMAtriptan (IMITREX STATDOSE) 6 MG/0.5ML pen injector kit, Inject 0.5 mLs (6 mg) Subcutaneous at onset of headache for migraine (may repeat once after 2 hrs) May repeat in 1 hour. Max 12 mg/24 hours., Disp: 2 kit, Rfl: 1     tiZANidine (ZANAFLEX) 4 MG tablet, Take 1 tablet (4 mg) by mouth 3 times daily as needed for muscle spasms, Disp: 90 tablet, Rfl: 1     traMADol (ULTRAM) 50 MG tablet, Take 1 tablet (50 mg)  by mouth every 6 hours as needed for pain maximum 4 tablet(s) per day, Disp: 15 tablet, Rfl: 0     zonisamide (ZONEGRAN) 25 MG capsule, Take 125mg daily for 2 weeks, then 150mg, Disp: 360 capsule, Rfl: 3      Allergies:   Bee venom; No clinical screening - see comments; Chicken-derived products (egg); Gabapentin; Gluten meal; Lactose; Milk digestant; Milk protein extract; Topiramate; Wheat; and Wheat bran      Past Medical History:    Allergic rhinitis 09/2007  Arthritis (Found during search for cause of back pain), Arthritis of facet joints at multiple vertebral levels (H) 11/01/2013  Autoimmune disease (H) (Immunosuppresive) 2016  Bleeding disorder (H) (Bruise easily), Easy bruising 2016  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  Chronic sinusitis (Diagnosed with chronic pansinusitis 2016), Chronic sinusitis with recurrent bronchitis  00/2007  Chronic tonsillitis (Had tonsils removed 02/1981, rheumatic fever) 1980  Tom-Danlos disease  Gastroesophageal reflux disease (I have large hiatal hernia) 3/1/2017  Hiatal hernia 10/2016  Hoarseness (It comes and goes)  Immunosuppression (H) (Common with Tom Danlos Syndrome) 3/1/2015  Migraines 1/1/2007  Nasal polyps (Were revoved 03/2017, benign) 2013  Pneumonia (Have had pneumonia several times)  Swelling, mass, or lump in head and neck (Lymph node has been growing for last year) 08/2016  Thyroid disease 01/01/2008  Pain syndrome, chronic  s/p TLIF L4-5,L5-S1 with solid arthrodesis  Postlaminectomy syndrome, lumbar region  Cervicalgia  Slow transit constipation  Urinary urgency  Nocturia  Cardiac device in situ- [employer] Implanted Loop Recorder (ILR)  Acquired hypothyroidism  Anxiety disorder  Astigmatism  Opioid dependence (H)  Cyst of fallopian tube  Depression  DNS (deviated nasal septum)  Drug-seeking behavior  Dysuria  Essential hypertension with goal blood pressure less than 140/90  Head and neck  "lymphadenopathy  Hepatomegaly  Keratoconjunctivitis sicca (H)  Low back pain radiating to both legs, Low back pain of multiple sites of spine with sciatica  Liver lesion  Lumbar degenerative disc disease, Lumbar radiculopathy  Lymphadenopathy of left cervical region   Migraine without aura  Narcotic abuse, episodic  Nasal turbinate hypertrophy  Obese  Opioid type dependence, continuous (H)  Other acute postoperative pain  Pain disorder associated with psychological and physical factors  Presbyopia  Spondylolisthesis of lumbar region  Tachycardia  Uterine endometriosis       Past Surgical History:  Adenoidectomy 1981  AS repair of nasal septum  Nasal/ sinus polypectomy (polyps were benign) 2017  Tonsillectomy X2 1981  Tubal ligation    Family History:     Mother-Connective Tissue Disorder (eds)  Sister-Connective Tissue Disorder (eds), Asthma (Pediatric Asthma), Migraines, Thyroid Disease  Father- Cancer (Spinal tumor grew into spinal cord, went in brain), Migraines  Son- Asthma  Maternal Grandmother- Diabetes (Elderly diabetes), heart disease, Hypertension  Paternal Grandmother- Diabetes (Elderly diabetes)  Paternal Grandfather- Diabetes (Elderly diabetes)  No family history of Breast Cancer.     Social History:   Former cigarette smoker of 0.2 packs per day for 10 years. She started 1/1/1991, and quit 12/1/2013. She smoked on and off during specific dates. She never used smokeless tobacco. She has a couple drinks per year.     Physical Exam:   /80  Pulse 125  Ht 1.711 m (5' 7.36\")  Wt 87.6 kg (193 lb 3.2 oz)  BMI 29.94 kg/m2   Constitutional: Alert, oriented, pleasant, no acute distress  Head: Normocephalic, atraumatic  Eyes: Extra-ocular movements intact, no scleral icterus  ENT: Oropharynx clear, moist mucus membranes, good dentition  Neck: Supple, no lymphadenopathy, no thyromegaly   Cardiovascular: Tachycardic, regular rhythm, no murmurs, rubs or gallops, peripheral pulses full/symmetric  Respiratory: " Good air movement bilaterally, lungs clear, no wheezes/rales/rhonchi  GI: Abdomen soft, bowel sounds present, nondistended, nontender, no organomegaly or masses, no rebound/guarding  Musculoskeletal: No edema, normal muscle tone, normal gait  Neurologic: Alert and oriented, cranial nerves 2-12 intact.  Psychiatric: normal mentation, affect and mood      Assessment and Plan:  Allergic state, sequela  - ALLERGY/ASTHMA ADULT REFERRAL    EDS (Tom-Danlos syndrome)  - SPORTS MEDICINE REFERRAL  - IgA  - IgM  - IgG    Multiple joint pain  - SPORTS MEDICINE REFERRAL    Screening for hyperlipidemia  - Lipid panel reflex to direct LDL Fasting    Pulmonary nodules  - CT Chest Low Dose Non Contrast    Screening for HIV without presence of risk factors  - HIV Antigen Antibody Combo    Generalized hyperhidrosis  - TSH with free T4 reflex  - Follicle stimulating hormone        #Routine Health Maintenence:  Depression Screening:   PHQ-2 ( 1999 Pfizer) 3/7/2018 10/25/2017   Q1: Little interest or pleasure in doing things 0 0   Q2: Feeling down, depressed or hopeless 0 0   PHQ-2 Score 0 0   Q1: Little interest or pleasure in doing things Not at all Not at all   Q2: Feeling down, depressed or hopeless Not at all Not at all   PHQ-2 Score 0 0     Immunizations (zoster, pneumovax, flu, Tdap, Hep A/B):   There is no immunization history on file for this patient.  Lipids: ordered  Lung Ca Screening (>30 pk age 55-79 or >20 py age 50-79 + RF): ordered to f/u nodules  Colonoscopy (50-75 yrs): n/a  Dexa (>65W or 70M yrs): n/a  Mammogram (40-75 yrs): ordered  Pap (21-65 yrs): HPV neg  Lab Results   Component Value Date    PAP NIL 01/19/2018     GC/Chlam (<25 yrs): n/a  HIV/HCV if risk factors: ordered  Tob/EtOH: n/a  Lifestyle factors: reviewed  Advanced Directive: deferred      Follow-up: prn        Scribe Disclosure:   Diana ROBBINS, am serving as a scribe to document services personally performed by Ellyn Rivera MD at this  visit, based upon the provider's statements to me. All documentation has been reviewed by the aforementioned provider prior to being entered into the official medical record.     Portions of this medical record were completed by a scribe. UPON MY REVIEW AND AUTHENTICATION BY ELECTRONIC SIGNATURE, this confirms (a) I performed the applicable clinical services, and (b) the record is accurate.   Ellyn Rivera MD  Internal Medicine

## 2018-04-19 ENCOUNTER — OFFICE VISIT (OUTPATIENT)
Dept: INTERNAL MEDICINE | Facility: CLINIC | Age: 48
End: 2018-04-19
Payer: MEDICARE

## 2018-04-19 VITALS
SYSTOLIC BLOOD PRESSURE: 119 MMHG | HEART RATE: 125 BPM | BODY MASS INDEX: 30.32 KG/M2 | HEIGHT: 67 IN | WEIGHT: 193.2 LBS | DIASTOLIC BLOOD PRESSURE: 80 MMHG

## 2018-04-19 DIAGNOSIS — M25.50 MULTIPLE JOINT PAIN: ICD-10-CM

## 2018-04-19 DIAGNOSIS — Q79.60 EDS (EHLERS-DANLOS SYNDROME): ICD-10-CM

## 2018-04-19 DIAGNOSIS — R91.8 PULMONARY NODULES: ICD-10-CM

## 2018-04-19 DIAGNOSIS — Z13.220 SCREENING FOR HYPERLIPIDEMIA: ICD-10-CM

## 2018-04-19 DIAGNOSIS — Z11.4 SCREENING FOR HIV WITHOUT PRESENCE OF RISK FACTORS: ICD-10-CM

## 2018-04-19 DIAGNOSIS — T78.40XS ALLERGIC STATE, SEQUELA: Primary | ICD-10-CM

## 2018-04-19 DIAGNOSIS — R61 GENERALIZED HYPERHIDROSIS: ICD-10-CM

## 2018-04-19 PROBLEM — Z76.5 DRUG-SEEKING BEHAVIOR: Status: RESOLVED | Noted: 2017-12-09 | Resolved: 2018-04-19

## 2018-04-19 PROBLEM — F11.10 NARCOTIC ABUSE, EPISODIC (H): Status: RESOLVED | Noted: 2017-12-09 | Resolved: 2018-04-19

## 2018-04-19 RX ORDER — ERGOCALCIFEROL 1.25 MG/1
CAPSULE, LIQUID FILLED ORAL
COMMUNITY
End: 2018-07-07

## 2018-04-19 RX ORDER — RIZATRIPTAN BENZOATE 5 MG/1
TABLET, ORALLY DISINTEGRATING ORAL
Refills: 6 | COMMUNITY
Start: 2018-03-10 | End: 2019-01-15

## 2018-04-19 RX ORDER — BUTALBITAL, ACETAMINOPHEN AND CAFFEINE 50; 325; 40 MG/1; MG/1; MG/1
TABLET ORAL
Refills: 0 | COMMUNITY
Start: 2018-01-17 | End: 2019-01-10

## 2018-04-19 ASSESSMENT — PAIN SCALES - GENERAL: PAINLEVEL: MODERATE PAIN (5)

## 2018-04-19 NOTE — MR AVS SNAPSHOT
After Visit Summary   4/19/2018    Ada Welch    MRN: [id number]           Patient Information     Date Of Birth          1970        Visit Information        Provider Department      4/19/2018 10:55 AM Ellyn Rivera MD [facility]        Today's Diagnoses     Allergic state, sequela    -  1    EDS (Tom-Danlos syndrome)        Multiple joint pain        Screening for hyperlipidemia        Pulmonary nodules        Screening for HIV without presence of risk factors        Generalized hyperhidrosis           Follow-ups after your visit        Additional Services     ALLERGY/ASTHMA ADULT REFERRAL       Your provider has referred you to: [facility] - [phone]  [email or web address]    Please be aware that coverage of these services is subject to the terms and limitations of your health insurance plan.  Call member services at your health plan with any benefit or coverage questions.      Please bring the following with you to your appointment:    (1) Any X-Rays, CTs or MRIs which have been performed.  Contact the facility where they were done to arrange for  prior to your scheduled appointment.    (2) List of current medications  (3) This referral request   (4) Any documents/labs given to you for this referral            SPORTS MEDICINE REFERRAL       Your provider has referred you to:  SHASHA Mahmood    Please be aware that coverage of these services is subject to the terms and limitations of your health insurance plan.  Call member services at your health plan with any benefit or coverage questions.      Please bring the following to your appointment:    >>   Any x-rays, CTs or MRIs which have been performed.  Contact the facility where they were done to arrange for  prior to your scheduled appointment.    >>   List of current medications   >>   This referral request   >>    Any documents/labs given to you for this referral                  Follow-up notes from your care team     Return in about 6 months (around 10/19/2018), or if symptoms worsen or fail to improve.      Your next 10 appointments already scheduled     Apr 20, 2018  1:00 PM CDT   New Visit with Adolfo Mahmood MD   [facility] [place] ([facility] [place])    [address]  [place] [address] [phone]            Apr 20, 2018  7:45 PM CDT   (Arrive by 7:30 PM)   MR LUMBAR SPINE W/O & W CONTRAST with [address] [facility] [place] [facility] ([facility] [place])    [address] [facility]  1st Floor  [facility] [address] [phone]           Take your medicines as usual, unless your doctor tells you not to. Bring a list of your current medicines to your exam (including vitamins, minerals and over-the-counter drugs).  You may or may not receive intravenous (IV) contrast for this exam pending the discretion of the Radiologist.  You do not need to do anything special to prepare.  The MRI machine uses a strong magnet. Please wear clothes without metal (snaps, zippers). A sweatsuit works well, or we may give you a hospital gown.  Please remove any body piercings and hair extensions before you arrive. You will also remove watches, jewelry, hairpins, wallets, dentures, partial dental plates and hearing aids. You may wear contact lenses, and you may be able to wear your rings. We have a safe place to keep your personal items, but it is safer to leave them at home.  **IMPORTANT** THE INSTRUCTIONS BELOW ARE ONLY FOR THOSE PATIENTS WHO HAVE BEEN PRESCRIBED SEDATION OR GENERAL ANESTHESIA DURING THEIR MRI PROCEDURE:  IF YOUR DOCTOR PRESCRIBED ORAL SEDATION (take medicine to help you relax during your exam):   You must get the medicine from your doctor (oral medication) before you arrive. Bring the medicine to the exam. Do not take it at home.  You ll be told when to take it upon arriving for your exam.   Arrive one hour early. Bring someone who can take you home after the test. Your medicine will make you sleepy. After the exam, you may not drive, take a bus or take a taxi by yourself.  IF YOUR DOCTOR PRESCRIBED IV SEDATION:   Arrive one hour early. Bring someone who can take you home after the test. Your medicine will make you sleepy. After the exam, you may not drive, take a bus or take a taxi by yourself.   No eating 6 hours before your exam. You may have clear liquids up until 4 hours before your exam. (Clear liquids include water, clear tea, black coffee and fruit juice without pulp.)  IF YOUR DOCTOR PRESCRIBED ANESTHESIA (be asleep for your exam):   Arrive 1 1/2 hours early. Bring someone who can take you home after the test. You may not drive, take a bus or take a taxi by yourself.   No eating 8 hours before your exam. You may have clear liquids up until 4 hours before your exam. (Clear liquids include water, clear tea, black coffee and fruit juice without pulp.)   You will spend four to five hours in the recovery room.  Please call the Imaging Department at your exam site with any questions.            Apr 26, 2018 10:00 AM CDT   LAB with  LAB   [facility] ([facility])    [address] [place] [id number] [phone]           Please do not eat 10-12 hours before your appointment if you are coming in fasting for labs on lipids, cholesterol, or glucose (sugar). This does not apply to pregnant women. Water, hot tea and black coffee (with nothing added) are okay. Do not drink other fluids, diet soda or chew gum.            Apr 26, 2018 11:00 AM CDT   (Arrive by 10:45 AM)   New Patient Visit with SIOMNE Russell [facility] ([facility])    [address] [place] [id number] [phone]            Apr 26, 2018 12:20 PM CDT    (Arrive by 12:05 PM)   CT CHEST LOW DOSE NON CONTRAST with UCCT1   [facility] [place] [facility] ([facility])    [address] [phone] [phone]           Please bring any scans or X-rays taken at other hospitals, if similar tests were done. Also bring a list of your medicines, including vitamins, minerals and over-the-counter drugs. It is safest to leave personal items at home.  Be sure to tell your doctor:   If you have any allergies.   If there s any chance you are pregnant.   If you are breastfeeding.  You do not need to do anything special to prepare for this exam.  Please wear loose clothing, such as a sweat suit or jogging clothes. Avoid snaps, zippers and other metal. We may ask you to undress and put on a hospital gown.            May 01, 2018 10:00 AM CDT   (Arrive by 9:45 AM)   New Patient Visit with Noemy Nguyen MD   [facility] Neurology ([facility])    [address] [place] [id number] [phone]            Jun 07, 2018  9:30 AM CDT   Ech Complete with UCECHCR1   [facility] Echo ([facility])    [address] [place] [address] [phone]           1. Please bring or wear a comfortable two-piece outfit. 2. You may eat, drink and take your normal medicines. 3. For any questions that cannot be answered, please contact the ordering physician            Jun 07, 2018 10:30 AM CDT   (Arrive by 10:15 AM)   Implantable Loop Recorder with  Cv Device 1   [facility] ([facility])    [address] [place] [address]  [address] [place] [id number] [phone]            Jun 07, 2018 11:00 AM CDT   (Arrive by 10:45 AM)   RETURN ARRHYTHMIA with FIDELINA Mora [facility])    [address] [place] [address]  [address] [place] [phone]    [phone]            Jul 11, 2018 11:15 AM CDT   (Arrive by 11:00 AM)   Return Visit with Wade Singh MD   [facility] ([facility])    [address] [facility]  4th [facility] [id number] [phone]              Future tests that were ordered for you today     Open Future Orders        Priority Expected Expires Ordered    Lipid panel reflex to direct LDL Fasting Routine 4/19/2018 4/19/2019 4/19/2018    IgA Routine 4/19/2018 4/19/2019 4/19/2018    IgM Routine 4/19/2018 4/19/2019 4/19/2018    IgG Routine 4/19/2018 4/19/2019 4/19/2018    CT Chest Low Dose Non Contrast Routine  4/19/2019 4/19/2018    TSH with free T4 reflex Routine 4/19/2018 5/3/2018 4/19/2018    Follicle stimulating hormone Routine 4/19/2018 4/19/2019 4/19/2018    HIV Antigen Antibody Combo Routine 4/19/2018 4/19/2019 4/19/2018            Who to contact     Please call your clinic at [phone] to:    Ask questions about your health    Make or cancel appointments    Discuss your medicines    Learn about your test results    Speak to your doctor            Additional Information About Your Visit        [employer] Information     [employer] gives you secure access to your electronic health record. If you see a primary care provider, you can also send messages to your care team and make appointments. If you have questions, please call your primary care clinic.  If you do not have a primary care provider, please call [phone] and they will assist you.      [employer] is an electronic gateway that provides easy, online access to your medical records. With [employer], you can request a clinic appointment, read your test results, renew a prescription or communicate with your care team.     To access your existing account, please contact your [facility] or call [phone] for assistance.        Care EveryWhere ID     This is your Care  "EveryWhere ID. This could be used by other organizations to access your [facility] medical records  [id number]        Your Vitals Were     Pulse Height BMI (Body Mass Index)             125 1.711 m (5' 7.36\") 29.94 kg/m2          Blood Pressure from Last 3 Encounters:   04/19/18 119/80   03/07/18 117/71   03/02/18 124/84    Weight from Last 3 Encounters:   04/19/18 87.6 kg (193 lb 3.2 oz)   03/07/18 89.4 kg (197 lb)   03/02/18 88.9 kg (196 lb)              We Performed the Following     ALLERGY/ASTHMA ADULT REFERRAL     SPORTS MEDICINE REFERRAL          Today's Medication Changes          These changes are accurate as of 4/19/18 12:09 PM.  If you have any questions, ask your nurse or doctor.               Stop taking these medicines if you haven't already. Please contact your care team if you have questions.     meloxicam 7.5 MG tablet   Commonly known as:  MOBIC                    Primary Care Provider Office Phone # Fax #    Ellyn Rivera [phone] [address] [address]        Equal Access to Services     [facility]: Hadii kia romoo Sonegrito, waaxda luqadaha, qaybta kaalmada adealanyada, odette tang. So [facility] [phone].    ATENCIÓN: Si habla español, tiene a sweeney disposición servicios gratuitos de asistencia lingüística. Llame al [phone].    We comply with applicable federal civil rights laws and [place] laws. We do not discriminate on the basis of race, color, national origin, age, disability, sex, sexual orientation, or gender identity.            Thank you!     Thank you for choosing [facility]  for your care. Our goal is always to provide you with excellent care. Hearing back from our patients is one way we can continue to improve our services. Please take a few minutes to complete the written survey that you may receive in the mail after your visit with us. Thank you!             Your Updated " Medication List - Protect others around you: Learn how to safely use, store and throw away your medicines at www.disposemymeds.org.          This list is accurate as of 4/19/18 12:09 PM.  Always use your most recent med list.                   Brand Name Dispense Instructions for use Diagnosis    ADVAIR DISKUS 250-50 MCG/DOSE diskus inhaler   Generic drug:  fluticasone-salmeterol      Inhale 1 puff into the lungs 2 times daily as needed        albuterol 108 (90 Base) MCG/ACT Inhaler    PROAIR HFA/PROVENTIL HFA/VENTOLIN HFA     Inhale 2 puffs into the lungs        amitriptyline 10 MG tablet    ELAVIL    270 tablet    Take 3 tablets (30 mg) by mouth At Bedtime    Chronic migraine without aura without status migrainosus, not intractable       buprenorphine 10 MCG/HR WK patch    BUTRANS    4 patch    Place 1 patch onto the skin every 7 days    Postlaminectomy syndrome of lumbar region       butalbital-acetaminophen-caffeine -40 MG per tablet    FIORICET/ESGIC     TAKE ONE TABLET BY MOUTH EVERY 4 HOURS AS NEEDED        cetirizine 10 MG tablet    zyrTEC     Take 10 mg by mouth daily        COMPRESSION STOCKINGS     3 each    1 each daily 20-30 mmHg Thigh Length measure and fit, color and style per patient preference. Doshar n jose per need.    Orthostatic hypotension       DULoxetine 30 MG EC capsule    CYMBALTA    60 capsule    Take 1 capsule (30 mg) by mouth daily    Lumbar post-laminectomy syndrome       eletriptan 20 MG tablet    RELPAX    18 tablet    Take 1 tablet (20 mg) by mouth at onset of headache for migraine May repeat in 2 hours. Max 4 tablets/24 hours.    Chronic daily headache, Intractable migraine without aura and with status migrainosus       EPINEPHrine 0.3 MG/0.3ML injection 2-pack    EPIPEN/ADRENACLICK/or ANY BX GENERIC EQUIV     Inject 0.3 mg into the muscle        famotidine 20 MG tablet    PEPCID    90 tablet    Take 1 tablet (20 mg) by mouth At Bedtime    Hiatal hernia, Mast cell disorder        fluticasone 50 MCG/ACT spray    FLONASE    1 Bottle    Spray 2 sprays into both nostrils daily    Chronic rhinitis, unspecified type       linaclotide 145 MCG capsule    LINZESS    30 capsule    Take 1 capsule (145 mcg) by mouth every morning (before breakfast) Last refill until GI clinic follow up.    Chronic idiopathic constipation       metoclopramide 5 MG tablet    REGLAN    20 tablet    Take 1 tablet (5 mg) by mouth every 6 hours as needed    Intractable chronic migraine without aura and with status migrainosus       midodrine 5 MG tablet    PROAMATINE    180 tablet    Take 1 tablet (5 mg) by mouth 2 times daily Take 5mg in the morning and 5 mg 4-6 hours later. Do not take within 5 hours of laying down.    Orthostatic hypotension       MULTIVITAMIN PO      Take by mouth daily        omeprazole 20 MG CR capsule    priLOSEC    90 capsule    Take 1 capsule (20 mg) by mouth daily    Hiatal hernia       ondansetron 4 MG ODT tab    ZOFRAN-ODT    60 tablet    Take 1-2 tablets (4-8 mg) by mouth every 12 hours as needed for nausea    Migraine without status migrainosus, not intractable, unspecified migraine type       ONE DAILY MULTIPLE VITAMIN OR           PROBIOTIC DAILY PO      Take by mouth 2 times daily 2 packets daily        rizatriptan 5 MG ODT tab    MAXALT-MLT     TAKE 1-2 TABLETS (5-10 MG) BY MOUTH AT ONSET OF HEADACHE FOR MIGRAINE (MAX 30 MG IN 24 HOURS)        SUMAtriptan 6 MG/0.5ML pen injector kit    IMITREX STATDOSE    2 kit    Inject 0.5 mLs (6 mg) Subcutaneous at onset of headache for migraine (may repeat once after 2 hrs) May repeat in 1 hour. Max 12 mg/24 hours.    Chronic migraine without aura without status migrainosus, not intractable       SYNTHROID 25 MCG tablet   Generic drug:  levothyroxine      Take 25 mcg by mouth daily        tiZANidine 4 MG tablet    ZANAFLEX    90 tablet    Take 1 tablet (4 mg) by mouth 3 times daily as needed for muscle spasms    Post laminectomy syndrome        traMADol 50 MG tablet    ULTRAM    15 tablet    Take 1 tablet (50 mg) by mouth every 6 hours as needed for pain maximum 4 tablet(s) per day    Lumbar post-laminectomy syndrome       vitamin D 71313 UNIT capsule    ERGOCALCIFEROL          zonisamide 25 MG capsule    Zonegran    360 capsule    Take 125mg daily for 2 weeks, then 150mg    Occipital neuritis

## 2018-04-19 NOTE — NURSING NOTE
Chief Complaint   Patient presents with     Physical     pt here for physical     Mey He CMA at 11:08 AM on 4/19/2018.

## 2018-04-19 NOTE — PROGRESS NOTES
#Routine Health Maintenence:  Depression Screening:   PHQ-2 ( 1999 [employer]) 3/7/2018 10/25/2017   Q1: Little interest or pleasure in doing things 0 0   Q2: Feeling down, depressed or hopeless 0 0   PHQ-2 Score 0 0   Q1: Little interest or pleasure in doing things Not at all Not at all   Q2: Feeling down, depressed or hopeless Not at all Not at all   PHQ-2 Score 0 0     Immunizations (zoster, pneumovax, flu, Tdap, Hep A/B):   There is no immunization history on file for this patient.  Lipids: No results for input(s): CHOL, HDL, LDL, TRIG, CHOLHDLRATIO in the last 96899 hours.  PSA (50-75 yrs): No results found for: PSA   AAA Screening (65-75 yrs):  Lung Ca Screening (>30 pk age 55-79 or >20 py age 50-79 + RF):  Colonoscopy (50-75 yrs):  Dexa (>65W or 70M yrs):  Mammogram (40-75 yrs):  Pap (21-65 yrs):  GC/Chlam (<25 yrs):  HIV/HCV if risk factors:  Tob/EtOH:  Lifestyle factors:  Advanced Directive:    pulm/allergy

## 2018-04-20 ENCOUNTER — RADIANT APPOINTMENT (OUTPATIENT)
Dept: MRI IMAGING | Facility: CLINIC | Age: 48
End: 2018-04-20
Attending: PSYCHIATRY & NEUROLOGY
Payer: MEDICARE

## 2018-04-20 ENCOUNTER — MYC MEDICAL ADVICE (OUTPATIENT)
Dept: INTERNAL MEDICINE | Facility: CLINIC | Age: 48
End: 2018-04-20

## 2018-04-20 DIAGNOSIS — M54.16 LUMBAR RADICULOPATHY: ICD-10-CM

## 2018-04-20 DIAGNOSIS — R59.1 LYMPHADENOPATHY: Primary | ICD-10-CM

## 2018-04-20 RX ORDER — GADOBUTROL 604.72 MG/ML
10 INJECTION INTRAVENOUS ONCE
Status: COMPLETED | OUTPATIENT
Start: 2018-04-20 | End: 2018-04-20

## 2018-04-20 RX ADMIN — GADOBUTROL 9 ML: 604.72 INJECTION INTRAVENOUS at 20:20

## 2018-04-24 ENCOUNTER — OFFICE VISIT (OUTPATIENT)
Dept: ORTHOPEDICS | Facility: CLINIC | Age: 48
End: 2018-04-24
Payer: COMMERCIAL

## 2018-04-24 VITALS
SYSTOLIC BLOOD PRESSURE: 110 MMHG | WEIGHT: 193 LBS | BODY MASS INDEX: 30.29 KG/M2 | HEIGHT: 67 IN | DIASTOLIC BLOOD PRESSURE: 86 MMHG

## 2018-04-24 DIAGNOSIS — M70.62 TROCHANTERIC BURSITIS OF BOTH HIPS: ICD-10-CM

## 2018-04-24 DIAGNOSIS — G89.4 PAIN SYNDROME, CHRONIC: ICD-10-CM

## 2018-04-24 DIAGNOSIS — Q79.60 EHLERS-DANLOS SYNDROME: Primary | ICD-10-CM

## 2018-04-24 DIAGNOSIS — M12.9 ARTHRITIS INVOLVING MULTIPLE SITES: ICD-10-CM

## 2018-04-24 DIAGNOSIS — M70.61 TROCHANTERIC BURSITIS OF BOTH HIPS: ICD-10-CM

## 2018-04-24 PROCEDURE — 99203 OFFICE O/P NEW LOW 30 MIN: CPT | Performed by: FAMILY MEDICINE

## 2018-04-24 ASSESSMENT — ENCOUNTER SYMPTOMS: MYALGIAS: 1

## 2018-04-24 NOTE — MR AVS SNAPSHOT
After Visit Summary   4/24/2018    Ada Welch    MRN: [id number]           Patient Information     Date Of Birth          1970        Visit Information        Provider Department      4/24/2018 11:20 AM Adolfo Mahmood MD [facility] [place]        Today's Diagnoses     Tom-Danlos syndrome    -  1    Arthritis involving multiple sites        Pain syndrome, chronic        Trochanteric bursitis of both hips           Follow-ups after your visit        Additional Services     PAIN PT EVAL AND TREAT                 Your next 10 appointments already scheduled     Apr 26, 2018 10:00 AM CDT   LAB with  LAB   [facility] ([facility])    [address] [place] [id number] [phone]           Please do not eat 10-12 hours before your appointment if you are coming in fasting for labs on lipids, cholesterol, or glucose (sugar). This does not apply to pregnant women. Water, hot tea and black coffee (with nothing added) are okay. Do not drink other fluids, diet soda or chew gum.            Apr 26, 2018 11:00 AM CDT   (Arrive by 10:45 AM)   New Patient Visit with Aga Ang PA-C   [facility] Neurosurgery ([facility])    [address] [place] [address] [id number] [phone]            Apr 26, 2018 12:20 PM CDT   (Arrive by 12:05 PM)   CT CHEST LOW DOSE NON CONTRAST with UCCT1   [facility] ([facility])    [address] [place] [address] [id number] [phone]           Please bring any scans or X-rays taken at other hospitals, if similar tests were done. Also bring a list of your medicines, including vitamins, minerals and over-the-counter drugs. It is safest to leave personal items at home.  Be sure to tell your doctor:   If you have any allergies.   If there s any chance you are pregnant.   If you are  breastfeeding.  You do not need to do anything special to prepare for this exam.  Please wear loose clothing, such as a sweat suit or jogging clothes. Avoid snaps, zippers and other metal. We may ask you to undress and put on a hospital gown.            May 01, 2018 10:00 AM CDT   (Arrive by 9:45 AM)   New Patient Visit with Noemy Nguyen MD   [facility] ([facility])    [address]  3rd Floor  [facility] [id number] [phone]            Jun 07, 2018  9:30 AM CDT   Ech Complete with UCECHCR1   [facility] ([facility])    [address]  3rd Floor  [facility] [id number] [phone]           1. Please bring or wear a comfortable two-piece outfit. 2. You may eat, drink and take your normal medicines. 3. For any questions that cannot be answered, please contact the ordering physician            Jun 07, 2018 10:30 AM CDT   (Arrive by 10:15 AM)   Implantable Loop Recorder with Uc Cv Device 1   [facility] ([facility])    [address]  [address]  [facility] [id number] [phone]            Jun 07, 2018 11:00 AM CDT   (Arrive by 10:45 AM)   RETURN ARRHYTHMIA with FIDELINA Mora [facility] ([facility])    [address]  [address]  [facility] [id number] [phone]            Jul 11, 2018 11:15 AM CDT   (Arrive by 11:00 AM)   Return Visit with Wade Singh MD   [facility]rolog[facility] ([facility])    [address]  4th Floor  [facility] [id number] [phone]            Jul 23, 2018 12:15 PM CDT   (Arrive by 12:00 PM)   New Allergy with Vahe Rodriguez MD   [facility] ([facility])    [address]  [address]  [facility] [id number] [phone]            "Do not take anti-histamines or Zantac for seven day prior to your appointment.            Oct 25, 2018  9:25 AM CDT   (Arrive by 9:10 AM)   Return Visit with Ellyn Rivera MD   [facility] ([facility])    [address] [address] [phone]              Who to contact     If you have questions or need follow up information about today's clinic visit or your schedule please contact [facility] directly at [phone].  Normal or non-critical lab and imaging results will be communicated to you by Neediumhart, letter or phone within 4 business days after the clinic has received the results. If you do not hear from us within 7 days, please contact the clinic through Neediumhart or phone. If you have a critical or abnormal lab result, we will notify you by phone as soon as possible.  Submit refill requests through 365looks [email or web address]) or call your pharmacy and they will forward the refill request to us. Please allow 3 business days for your refill to be completed.          Additional Information About Your Visit        MyChart Information     [email or web address] ([email or web address]) gives you secure access to your electronic health record. If you see a primary care provider, you can also send messages to your care team and make appointments. If you have questions, please call your primary care clinic.  If you do not have a primary care provider, please call [phone] and they will assist you.        Care EveryWhere ID     This is your Care EveryWhere ID. This could be used by other organizations to access your [facility] medical records  [id number]        Your Vitals Were     Height BMI (Body Mass Index)                5' 7.36\" (1.711 m) 29.91 kg/m2           Blood Pressure from Last 3 Encounters:   04/24/18 110/86   04/19/18 119/80   03/07/18 117/71    Weight from Last 3 Encounters:   04/24/18 193 lb (87.5 kg)   04/19/18 193 lb 3.2 oz (87.6 kg) "   03/07/18 197 lb (89.4 kg)              We Performed the Following     PAIN PT EVAL AND TREAT        Primary Care Provider Office Phone # Fax #    Ellyn Rivera [phone] [address] [address]        Equal Access to Services     CHRISTINA GUY : Hadii aad ku hadasho Soomaali, waaxda luqadaha, qaybta kaalmada adeegyada, waxay idiin hayaan adeeg kheileen la'aan ah. So [facility] [phone].    ATENCIÓN: Si habla español, tiene a sweeney disposición servicios gratuitos de asistencia lingüística. [facility] [phone].    We comply with applicable federal civil rights laws and [place] laws. We do not discriminate on the basis of race, color, national origin, age, disability, sex, sexual orientation, or gender identity.            Thank you!     Thank you for choosing [facility]  for your care. Our goal is always to provide you with excellent care. Hearing back from our patients is one way we can continue to improve our services. Please take a few minutes to complete the written survey that you may receive in the mail after your visit with us. Thank you!             Your Updated Medication List - Protect others around you: Learn how to safely use, store and throw away your medicines at www.disposemymeds.org.          This list is accurate as of 4/24/18  1:04 PM.  Always use your most recent med list.                   Brand Name Dispense Instructions for use Diagnosis    ADVAIR DISKUS 250-50 MCG/DOSE diskus inhaler   Generic drug:  fluticasone-salmeterol      Inhale 1 puff into the lungs 2 times daily as needed        albuterol 108 (90 Base) MCG/ACT Inhaler    PROAIR HFA/PROVENTIL HFA/VENTOLIN HFA     Inhale 2 puffs into the lungs        amitriptyline 10 MG tablet    ELAVIL    270 tablet    Take 3 tablets (30 mg) by mouth At Bedtime    Chronic migraine without aura without status migrainosus, not intractable       buprenorphine 10 MCG/HR  WK patch    BUTRANS    4 patch    Place 1 patch onto the skin every 7 days    Postlaminectomy syndrome of lumbar region       butalbital-acetaminophen-caffeine -40 MG per tablet    FIORICET/ESGIC     TAKE ONE TABLET BY MOUTH EVERY 4 HOURS AS NEEDED        cetirizine 10 MG tablet    zyrTEC     Take 10 mg by mouth daily        COMPRESSION STOCKINGS     3 each    1 each daily 20-30 mmHg Thigh Length measure and fit, color and style per patient preference. Doshar n jose per need.    Orthostatic hypotension       DULoxetine 30 MG EC capsule    CYMBALTA    60 capsule    Take 1 capsule (30 mg) by mouth daily    Lumbar post-laminectomy syndrome       eletriptan 20 MG tablet    RELPAX    18 tablet    Take 1 tablet (20 mg) by mouth at onset of headache for migraine May repeat in 2 hours. Max 4 tablets/24 hours.    Chronic daily headache, Intractable migraine without aura and with status migrainosus       EPINEPHrine 0.3 MG/0.3ML injection 2-pack    EPIPEN/ADRENACLICK/or ANY BX GENERIC EQUIV     Inject 0.3 mg into the muscle        famotidine 20 MG tablet    PEPCID    90 tablet    Take 1 tablet (20 mg) by mouth At Bedtime    Hiatal hernia, Mast cell disorder       fluticasone 50 MCG/ACT spray    FLONASE    1 Bottle    Spray 2 sprays into both nostrils daily    Chronic rhinitis, unspecified type       linaclotide 145 MCG capsule    LINZESS    30 capsule    Take 1 capsule (145 mcg) by mouth every morning (before breakfast) Last refill until GI clinic follow up.    Chronic idiopathic constipation       metoclopramide 5 MG tablet    REGLAN    20 tablet    Take 1 tablet (5 mg) by mouth every 6 hours as needed    Intractable chronic migraine without aura and with status migrainosus       midodrine 5 MG tablet    PROAMATINE    180 tablet    Take 1 tablet (5 mg) by mouth 2 times daily Take 5mg in the morning and 5 mg 4-6 hours later. Do not take within 5 hours of laying down.    Orthostatic hypotension       MULTIVITAMIN PO       Take by mouth daily        omeprazole 20 MG CR capsule    priLOSEC    90 capsule    Take 1 capsule (20 mg) by mouth daily    Hiatal hernia       ondansetron 4 MG ODT tab    ZOFRAN-ODT    60 tablet    Take 1-2 tablets (4-8 mg) by mouth every 12 hours as needed for nausea    Migraine without status migrainosus, not intractable, unspecified migraine type       ONE DAILY MULTIPLE VITAMIN OR           PROBIOTIC DAILY PO      Take by mouth 2 times daily 2 packets daily        rizatriptan 5 MG ODT tab    MAXALT-MLT     TAKE 1-2 TABLETS (5-10 MG) BY MOUTH AT ONSET OF HEADACHE FOR MIGRAINE (MAX 30 MG IN 24 HOURS)        SUMAtriptan 6 MG/0.5ML pen injector kit    IMITREX STATDOSE    2 kit    Inject 0.5 mLs (6 mg) Subcutaneous at onset of headache for migraine (may repeat once after 2 hrs) May repeat in 1 hour. Max 12 mg/24 hours.    Chronic migraine without aura without status migrainosus, not intractable       SYNTHROID 25 MCG tablet   Generic drug:  levothyroxine      Take 25 mcg by mouth daily        tiZANidine 4 MG tablet    ZANAFLEX    90 tablet    Take 1 tablet (4 mg) by mouth 3 times daily as needed for muscle spasms    Post laminectomy syndrome       traMADol 50 MG tablet    ULTRAM    15 tablet    Take 1 tablet (50 mg) by mouth every 6 hours as needed for pain maximum 4 tablet(s) per day    Lumbar post-laminectomy syndrome       vitamin D 79520 UNIT capsule    ERGOCALCIFEROL          zonisamide 25 MG capsule    Zonegran    360 capsule    Take 125mg daily for 2 weeks, then 150mg    Occipital neuritis

## 2018-04-24 NOTE — PROGRESS NOTES
[facility]   Clinic Visit s Apr 24, 2018    PCP: Ellyn Rivera      Ada is a 47 year old female who is seen in consultation at the request of Dr. Rivera for   Chief Complaint   Patient presents with     Shoulder Pain     Knee Pain     Hip Pain       Injury: Patient describes injury as EDS complications.      right-hand dominant    Location of Pain: bilateral hip, knee and shoulder, nonradiating   Duration of Pain:  Chronic since childhood  Rating of Pain at worst: 10/10,   Rating of Pain Currently: 7/10,   Pain is better with: pain medication   Pain is worse with: ADLs  Treatment so far consists of: Pain medication, [facility]  Associated symptoms: no distal numbness or tingling; denies swelling or warmth  Recent imaging completed: No recent imaging completed.  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     Chronic sinusitis 00/2007    Diagnosed with chronic pansinusitis 2016     Chronic tonsillitis 1980    Had tonsils removed 02/1981, rheumatic fever     Tom-Danlos disease      Gastroesophageal reflux disease 3/1/2017    I have large hiatal hernia     Hernia, abdominal 10/2016    Hiatal Hernia     Hiatal hernia 2016    Have adeline hiatel hernia     Hoarseness Unsure    It comes and goes     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     Migraines 1/1/2007    Unsure of exact date     Nasal polyps 2013    Were revoved 03/2017, benign     Pneumonia Unsure    Have had pneumonia  several times     Swelling, mass, or lump in head and neck 08/2016    Lymph node has been growing for last year     Thyroid disease 01/01/2008       Patient Active Problem List    Diagnosis Date Noted     Low back pain of multiple sites of spine with sciatica 12/09/2017     Priority: Medium     Migraine without aura 12/09/2017     Priority: Medium     Non compliance with medical treatment 12/09/2017     Priority: Medium     Obese 12/09/2017     Priority: Medium     Slow transit constipation 08/30/2017     Priority: Medium     Urinary urgency 08/30/2017     Priority: Medium     Nocturia 08/30/2017     Priority: Medium     Cardiac device in situ- [employer] Implanted Loop Recorder (ILR) 08/30/2017     Priority: Medium     Cervicalgia 08/25/2017     Priority: Medium     Headache 07/25/2017     Priority: Medium     Postlaminectomy syndrome, lumbar region 06/14/2017     Priority: Medium     Lymphadenopathy of left cervical region 03/27/2017     Priority: Medium     Easy bruising 10/13/2016     Priority: Medium     Head and neck lymphadenopathy 10/13/2016     Priority: Medium     Hepatomegaly 10/13/2016     Priority: Medium     Liver lesion 10/13/2016     Priority: Medium     Pain syndrome, chronic 09/05/2016     Priority: Medium     s/p TLIF L4-5,L5-S1 with solid arthrodesis 09/05/2016     Priority: Medium     Tom-Danlos syndrome 09/05/2016     Priority: Medium     Acquired hypothyroidism 07/22/2016     Priority: Medium     Anxiety 07/22/2016     Priority: Medium     Opioid dependence (H) 07/22/2016     Priority: Medium     Overview:   Treatment Agreement       Essential hypertension with goal blood pressure less than 140/90 07/22/2016     Priority: Medium     Low back pain 07/12/2016     Priority: Medium     Lumbar radiculopathy 02/18/2016     Priority: Medium     Encounter for genetic counseling and testing 01/05/2016     Priority: Medium     Overview:     [facility] Aortopathy Comprehensive Panel ordered 1/5/2016.        Tachycardia 09/29/2015     Priority: Medium     Astigmatism 12/12/2014     Priority: Medium     Keratoconjunctivitis sicca (H) 12/12/2014     Priority: Medium     Presbyopia 12/12/2014     Priority: Medium     Opioid type dependence, continuous (H) 10/09/2014     Priority: Medium     Other acute postoperative pain 10/09/2014     Priority: Medium     Anxiety disorder 08/21/2014     Priority: Medium     Depression 08/21/2014     Priority: Medium     Pain disorder associated with psychological and physical factors 08/21/2014     Priority: Medium     Low back pain radiating to both legs 07/08/2014     Priority: Medium     Arthritis of facet joints at multiple vertebral levels (H) 01/01/2014     Priority: Medium     Spondylolisthesis of lumbar region 12/10/2013     Priority: Medium     Lumbar degenerative disc disease 11/21/2013     Priority: Medium     DNS (deviated nasal septum) 10/09/2012     Priority: Medium     Nasal turbinate hypertrophy 10/09/2012     Priority: Medium     Dysuria 07/12/2011     Priority: Medium     Chronic sinusitis with recurrent bronchitis 02/01/2007     Priority: Medium     Allergic rhinitis 01/09/2007     Priority: Medium     Cyst of fallopian tube 12/28/1996     Priority: Medium     Arthritis involving multiple sites 01/01/1986     Priority: Medium     Uterine endometriosis 01/01/1986     Priority: Medium       Family History   Problem Relation Age of Onset     Connective Tissue Disorder Mother      eds     Connective Tissue Disorder Sister      eds     CANCER Father      Spinal tumor grew into spinal cord, went in brain     Migraines Father      DIABETES Maternal Grandmother      Elderly diabetes     HEART DISEASE Maternal Grandmother      Hypertension Maternal Grandmother      DIABETES Paternal Grandmother      Elderly diabetes     DIABETES Paternal Grandfather      Elderly diabetes     Asthma Sister      Pediatric Asthma     Migraines Sister      Asthma Son      Pediatric Asthma      "Thyroid Disease Sister      ,     Migraines Sister      Breast Cancer No family hx of        Social History     Social History     Marital status:      Spouse name: Carry     Number of children: 5     Years of education: N/A     Social History Main Topics     Smoking status: Former Smoker     Packs/day: 0.20     Years: 10.00     Types: Cigarettes     Start date: 1/1/1991     Quit date: 12/1/2013     Smokeless tobacco: Never Used      Comment: I smoked on and off during specified dates.     Alcohol use Yes      Comment: couple per year     Drug use: No     Past Surgical History:   Procedure Laterality Date     ADENOIDECTOMY  1981     AS REPAIR OF NASAL SEPTUM       NASAL/SINUS POLYPECTOMY  2017    Polyps were benign     TONSILLECTOMY       TONSILLECTOMY  1981     TUBAL LIGATION         Review of Systems   Musculoskeletal: Positive for joint pain and myalgias.         Physical Exam  /86  Ht 5' 7.36\" (1.711 m)  Wt 193 lb (87.5 kg)  BMI 29.91 kg/m2  Constitutional:well-developed, well-nourished, and in no distress.   Cardiovascular: Intact distal pulses.    Neurological: alert. Gait Normal:   Gait, station, stance, and balance appear normal for age  Skin: Skin is warm and dry.   Psychiatric: Mood and affect depressed, frowning, tearful during exam  Respiratory: unlabored, speaks in full sentences  Lymph: no LAD, no lymphangitis          Left Hip Exam   Gait: Normal.    Tenderness   The patient is experiencing tenderness in the greater trochanter.    Range of Motion   Extension:            Normal  Flexion:                 Normal  Internal Rotation:  Normal  External Rotation: Normal  Abduction:            Normal  Adduction:            Normal    Muscle Strength   Abduction:  5/5  Adduction:  5/5  Flexion:      5/5    Tests   Magdaleno: Negative  Bello:  Negative    Right Hip Exam   Gait: Normal.    Tenderness   The patient is experiencing tenderness in the greater trochanter.    Range of Motion   Extension:     "        Normal  Flexion:                 Normal  Internal Rotation:  Normal  External Rotation: Normal  Abduction:            Normal  Adduction:            Normal    Muscle Strength   Abduction:  5/5  Adduction:  5/5  Flexion:      5/5    Tests   Magdaleno: Negative  Bello:  Negative    Comments:  ROM is actually greater than normal in all directions    Left Shoulder Exam     Tenderness   None    Range of Motion   Active Abduction:                       Normal  Passive Abduction:                    Normal  Extension:                                  Normal  Forward Flexion:                        180+  External Rotation:                      90+  Internal Rotation 0 degrees:      Normal  Internal Rotation 90 degrees:    Normal    Muscle Strength   Abduction:            5/5  Internal Rotation:  5/5  External Rotation: 5/5  Supraspinatus:     5/5  Subscapularis:     5/5  Biceps:                 5/5    Tests   Impingement:   Positive  Potter:          Positive  Cross Arm:      Negative  Drop Arm:        Negative  Apprehension: Negative  Sulcus:            Negative    Right Shoulder Exam     Tenderness   None    Range of Motion   Active Abduction:                       Normal  Passive Abduction:                    Normal  Extension:                                  Normal  Forward Flexion:                        180+  External Rotation:                      90+  Internal Rotation 0 degrees:      Normal  Internal Rotation 90 degrees:    Normal    Muscle Strength   Abduction:            5/5  Internal Rotation:  5/5  External Rotation: 5/5  Supraspinatus:     5/5  Subscapularis:     5/5  Biceps:                 5/5    Tests   Impingement:   Positive  Potter:          Positive  Cross Arm:      Negative  Drop Arm:        Negative  Apprehension: Negative  Sulcus:            Negative        ASSESSMENT/PLAN    ICD-10-CM    1. 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 Arthritis involving multiple sites M12.9 PAIN PT  EVAL AND TREAT   3. Pain syndrome, chronic G89.4 PAIN PT EVAL AND TREAT   4. Trochanteric bursitis of both hips M70.61     M70.62      On exam today patient exhibits signs of bilateral shoulder impingement and bilateral trochanteric bursitis.  Normally would treat these with judicious use of cortisone injections and physical therapy focused on stabilization.  Patient tells me she does not respond to traditional physical therapy through [facility] due to her EDS.  She reports being taught isometric exercises and given myofascial treatments but continues to do yoga on her own.  I cautioned against yoga due to the excessive stretching which could impair her joints given her natural instability due to the EDS.  Her pain complaints however seem to exceed those normally seen with EDS.  I note that she has a number of other issues and [facility]  This appears to be more of a chronic pain picture overall.  I would be very hesitant to use cortisone injections in somebody who exhibits this type of hyperlaxity.  Comprehensive pain management seems in her best interest overall.  Showed specific isolated orthopedic issues develop, then reevaluation would be recommended.  Otherwise, long-term pain management is recommended.  In conjunction with this, I feel she might benefit from a global therapy approach and I have referred her to pain management physical therapy.  She is eager to obtain their perspective on her syndrome.

## 2018-04-24 NOTE — LETTER
4/24/2018         RE: Ada Welch  [address] [place] [address]        Dear Colleague,    Thank you for referring your patient, Ada Welch, to the [facility]. Please see a copy of my visit note below.    HPI   [facility]   Clinic Visit s Apr 24, 2018    PCP: Ellyn Rivera      Ada is a 47 year old female who is seen in consultation at the request of Dr. Rivera for   Chief Complaint   Patient presents with     Shoulder Pain     Knee Pain     Hip Pain       Injury: Patient describes injury as EDS complications.      right-hand dominant    Location of Pain: bilateral hip, knee and shoulder, nonradiating   Duration of Pain:  Chronic since childhood  Rating of Pain at worst: 10/10,   Rating of Pain Currently: 7/10,   Pain is better with: pain medication   Pain is worse with: ADLs  Treatment so far consists of: Pain medication, physical therapy at [facility]  Associated symptoms: no distal numbness or tingling; denies swelling or warmth  Recent imaging completed: No recent imaging completed.  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     Chronic sinusitis 00/2007    Diagnosed with chronic pansinusitis 2016     Chronic tonsillitis 1980    Had tonsils removed 02/1981, rheumatic fever     Tom-Danlos disease      Gastroesophageal reflux disease 3/1/2017    I have large hiatal hernia     Hernia, abdominal 10/2016    Hiatal Hernia     Hiatal hernia 2016    Have adeline hiatel  hernia     Hoarseness Unsure    It comes and goes     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     Migraines 1/1/2007    Unsure of exact date     Nasal polyps 2013    Were revoved 03/2017, benign     Pneumonia Unsure    Have had pneumonia several times     Swelling, mass, or lump in head and neck 08/2016    Lymph node has been growing for last year     Thyroid disease 01/01/2008       Patient Active Problem List    Diagnosis Date Noted     Low back pain of multiple sites of spine with sciatica 12/09/2017     Priority: Medium     Migraine without aura 12/09/2017     Priority: Medium     Non compliance with medical treatment 12/09/2017     Priority: Medium     Obese 12/09/2017     Priority: Medium     Slow transit constipation 08/30/2017     Priority: Medium     Urinary urgency 08/30/2017     Priority: Medium     Nocturia 08/30/2017     Priority: Medium     Cardiac device in situ- [employer] Implanted Loop Recorder (ILR) 08/30/2017     Priority: Medium     Cervicalgia 08/25/2017     Priority: Medium     Headache 07/25/2017     Priority: Medium     Postlaminectomy syndrome, lumbar region 06/14/2017     Priority: Medium     Lymphadenopathy of left cervical region 03/27/2017     Priority: Medium     Easy bruising 10/13/2016     Priority: Medium     Head and neck lymphadenopathy 10/13/2016     Priority: Medium     Hepatomegaly 10/13/2016     Priority: Medium     Liver lesion 10/13/2016     Priority: Medium     Pain syndrome, chronic 09/05/2016     Priority: Medium     s/p TLIF L4-5,L5-S1 with solid arthrodesis 09/05/2016     Priority: Medium     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       Tachycardia 09/29/2015     Priority: Medium     Astigmatism 12/12/2014     Priority: Medium     Keratoconjunctivitis sicca (H) 12/12/2014     Priority: Medium     Presbyopia 12/12/2014     Priority: Medium     Opioid type dependence, continuous (H) 10/09/2014     Priority: Medium     Other acute postoperative pain 10/09/2014     Priority: Medium     Anxiety disorder 08/21/2014     Priority: Medium     Depression 08/21/2014     Priority: Medium     Pain disorder associated with psychological and physical factors 08/21/2014     Priority: Medium     Low back pain radiating to both legs 07/08/2014     Priority: Medium     Arthritis of facet joints at multiple vertebral levels (H) 01/01/2014     Priority: Medium     Spondylolisthesis of lumbar region 12/10/2013     Priority: Medium     Lumbar degenerative disc disease 11/21/2013     Priority: Medium     DNS (deviated nasal septum) 10/09/2012     Priority: Medium     Nasal turbinate hypertrophy 10/09/2012     Priority: Medium     Dysuria 07/12/2011     Priority: Medium     Chronic sinusitis with recurrent bronchitis 02/01/2007     Priority: Medium     Allergic rhinitis 01/09/2007     Priority: Medium     Cyst of fallopian tube 12/28/1996     Priority: Medium     Arthritis involving multiple sites 01/01/1986     Priority: Medium     Uterine endometriosis 01/01/1986     Priority: Medium       Family History   Problem Relation Age of Onset     Connective Tissue Disorder Mother      eds     Connective Tissue Disorder Sister      eds     CANCER Father      Spinal tumor grew into spinal cord, went in brain     Migraines Father      DIABETES Maternal Grandmother      Elderly diabetes     HEART DISEASE Maternal  "Grandmother      Hypertension Maternal Grandmother      DIABETES Paternal Grandmother      Elderly diabetes     DIABETES Paternal Grandfather      Elderly diabetes     Asthma Sister      Pediatric Asthma     Migraines Sister      Asthma Son      Pediatric Asthma     Thyroid Disease Sister      ,     Migraines Sister      Breast Cancer No family hx of        Social History     Social History     Marital status:      Spouse name: Carry     Number of children: 5     Years of education: N/A     Social History Main Topics     Smoking status: Former Smoker     Packs/day: 0.20     Years: 10.00     Types: Cigarettes     Start date: 1/1/1991     Quit date: 12/1/2013     Smokeless tobacco: Never Used      Comment: I smoked on and off during specified dates.     Alcohol use Yes      Comment: couple per year     Drug use: No     Past Surgical History:   Procedure Laterality Date     ADENOIDECTOMY  1981     AS REPAIR OF NASAL SEPTUM       NASAL/SINUS POLYPECTOMY  2017    Polyps were benign     TONSILLECTOMY       TONSILLECTOMY  1981     TUBAL LIGATION         Review of Systems   Musculoskeletal: Positive for joint pain and myalgias.         Physical Exam  /86  Ht 5' 7.36\" (1.711 m)  Wt 193 lb (87.5 kg)  BMI 29.91 kg/m2  Constitutional:well-developed, well-nourished, and in no distress.   Cardiovascular: Intact distal pulses.    Neurological: alert. Gait Normal:   Gait, station, stance, and balance appear normal for age  Skin: Skin is warm and dry.   Psychiatric: Mood and affect depressed, frowning, tearful during exam  Respiratory: unlabored, speaks in full sentences  Lymph: no LAD, no lymphangitis          Left Hip Exam   Gait: Normal.    Tenderness   The patient is experiencing tenderness in the greater trochanter.    Range of Motion   Extension:            Normal  Flexion:                 Normal  Internal Rotation:  Normal  External Rotation: Normal  Abduction:            Normal  Adduction:            " Normal    Muscle Strength   Abduction:  5/5  Adduction:  5/5  Flexion:      5/5    Tests   Magdaleno: Negative  Bello:  Negative    Right Hip Exam   Gait: Normal.    Tenderness   The patient is experiencing tenderness in the greater trochanter.    Range of Motion   Extension:            Normal  Flexion:                 Normal  Internal Rotation:  Normal  External Rotation: Normal  Abduction:            Normal  Adduction:            Normal    Muscle Strength   Abduction:  5/5  Adduction:  5/5  Flexion:      5/5    Tests   Magdaleno: Negative  Bello:  Negative    Comments:  ROM is actually greater than normal in all directions    Left Shoulder Exam     Tenderness   None    Range of Motion   Active Abduction:                       Normal  Passive Abduction:                    Normal  Extension:                                  Normal  Forward Flexion:                        180+  External Rotation:                      90+  Internal Rotation 0 degrees:      Normal  Internal Rotation 90 degrees:    Normal    Muscle Strength   Abduction:            5/5  Internal Rotation:  5/5  External Rotation: 5/5  Supraspinatus:     5/5  Subscapularis:     5/5  Biceps:                 5/5    Tests   Impingement:   Positive  Potter:          Positive  Cross Arm:      Negative  Drop Arm:        Negative  Apprehension: Negative  Sulcus:            Negative    Right Shoulder Exam     Tenderness   None    Range of Motion   Active Abduction:                       Normal  Passive Abduction:                    Normal  Extension:                                  Normal  Forward Flexion:                        180+  External Rotation:                      90+  Internal Rotation 0 degrees:      Normal  Internal Rotation 90 degrees:    Normal    Muscle Strength   Abduction:            5/5  Internal Rotation:  5/5  External Rotation: 5/5  Supraspinatus:     5/5  Subscapularis:     5/5  Biceps:                 5/5    Tests   Impingement:    Positive  Potter:          Positive  Cross Arm:      Negative  Drop Arm:        Negative  Apprehension: Negative  Sulcus:            Negative        ASSESSMENT/PLAN    ICD-10-CM    1. 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 Arthritis involving multiple sites M12.9 PAIN PT EVAL AND TREAT   3. Pain syndrome, chronic G89.4 PAIN PT EVAL AND TREAT   4. Trochanteric bursitis of both hips M70.61     M70.62      On exam today patient exhibits signs of bilateral shoulder impingement and bilateral trochanteric bursitis.  Normally would treat these with judicious use of cortisone injections and physical therapy focused on stabilization.  Patient tells me she does not respond to traditional physical therapy through [place] [facility] due to her EDS.  She reports being taught isometric exercises and given myofascial treatments but continues to do yoga on her own.  I cautioned against yoga due to the excessive stretching which could impair her joints given her natural instability due to the EDS.  Her pain complaints however seem to exceed those normally seen with EDS.  I note that she has a number of other issues and sees a pain physician at the  of .  This appears to be more of a chronic pain picture overall.  I would be very hesitant to use cortisone injections in somebody who exhibits this type of hyperlaxity.  Comprehensive pain management seems in her best interest overall.  Showed specific isolated orthopedic issues develop, then reevaluation would be recommended.  Otherwise, long-term pain management is recommended.  In conjunction with this, I feel she might benefit from a global therapy approach and I have referred her to pain management physical therapy.  She is eager to obtain their perspective on her syndrome.    Again, thank you for allowing me to participate in the care of your patient.        Sincerely,        Adolfo Mahmood MD

## 2018-04-25 NOTE — PROGRESS NOTES
Neurosurgery Clinic   Date of Visit: 4/26/2018    Dear Dr. Galeano    We were happy to see Ada Welch, a pleasant 47 year old year old female for back pain.    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 She suffers chronic headaches, neck pain and back pain. She was seen in neurosurgery clinic in November by Dr. Moncada to establish with neurosurgery, in that case for her cervical spine. She had undergone an EMG which was not diagnostic for radiculopathy but was notable for abnormal spontaneous activity restricted to the left cervical paraspinal muscles.On physical exam she was noted to have decreased finger abduction and  in the bilateral upper extremities. She had bilaterally positive Moe's.. Otherwise her exam was normal.  Cervical MRI was reasonably normal. No obvious stenoses at any level.  There were flexion-extension views with the MRI and there was hypermobility noted although there was extensive motion artifact. There were extensive degenerative changes most pronounced at C4/5, C5/6, and C6/7.. There was abutment of the cord but there was residual space within the canal. In general it was felt she had generalized cervicalgia, bilateral C8 radiculopathy without radiographic evidence and hypermobility due to her Tom Danlos. The plan was for flexion-extension views of the cervical and lumbar spine, to wear her collar p.r.n. But to wean out of it to avoid a resultant atrophy which was possibly contributing to her generalized cervicalgia, and refer her back to the pain clinic where occipital nerve blocks might be considered. Recommending against Botox injections.. Surgery was not offered at that time. P.r.n. Follow-up    Since then you have seen her again, she spoke with you about the surgeries on her lumbar spine she had had in [place] and the more recent one here at the hands of Dr. Lucero. She has had ongoing numbness in the legs, specifically the left  leg and a recent MRI showing some mild impingement of the exiting left L5 nerve root. She would like to have an opinion about that and so she has had another set of EMGs,this time on her lower extremities and done by . She presents for an opinion today.    EMGs bilateral lower extremities performed 4/5/18  Interpretation the EMG is normal. There is no EMG evidence for left or right lumbar/sacral radiculopathy. There is no evidence for generalized myotonic disorder.     She describes her pain as present in the low lumbar spine radiating to the bilateral upper buttocks and the bilateral lateral thighs to just above the level of the knees.it is constant achy and sharp stiff sore to the touch and pressure-like. It is a little better on medication since October. But any motion aggravates it. At worst it is 10/10 screaming out loud pain. At best it drops to 4/10 and that is when she is lying still on her right side.    She has numbness present constantly in the left lateral anterior shin, described as tingling. She also has numbness from the waist down occasionally when she sits on the toilet.  It is not clear what particular instances bring that on. When this numbness appears she also experiences weakness of the bilateral legs. Again, it is randomly appearing, not related to any particular activity, not related to for example straining at stool or any other incident. This is also associated with difficulty with coordination and balance during most times.However she denies change in bowel or bladder control.  Patient Supplied Answers To the [facility] Pain Questionnaire  [facility] -  Patient Entered Questionnaire/Answers 4/26/2018   What number best describes your pain right now:  0 = No pain  to  10 = Worst pain imaginable 6   How would you describe the pain? burning, sharp, numbness, cutting, dull, aching, throbbing, pressure   Which of the following worsen your pain? standing, sitting, walking, exercise, coughing /  sneezing   Which of the following improve or reduce your pain?  lying down, medication   What number best describes your average pain for the past week:  0 = No pain  to  10 = Worst pain imaginable 6   What number best describes your LOWEST pain in past 24 hours:  0 = No pain  to  10 = Worst pain imaginable 4   What number best describes your WORST pain in past 24 hours:  0 = No pain  to  10 = Worst pain imaginable 7   When is your pain worst? Constant   What non-medicine treatments have you already had for your pain? pain clinic, physical therapy, relaxation training, acupuncture, chiropractic care, TENS (electrical stimulator), spine injections (shots), other nerve blocks, counseling, surgery, exercise, other   Have you tried treating your pain with medication?  Yes   Are you currently taking medications for your pain? Yes       Current Outpatient Prescriptions:      albuterol (PROAIR HFA/PROVENTIL HFA/VENTOLIN HFA) 108 (90 BASE) MCG/ACT Inhaler, Inhale 2 puffs into the lungs, Disp: , Rfl:      amitriptyline (ELAVIL) 10 MG tablet, Take 3 tablets (30 mg) by mouth At Bedtime, Disp: 270 tablet, Rfl: 3     buprenorphine (BUTRANS) 10 MCG/HR WK patch, Place 1 patch onto the skin every 7 days, Disp: 4 patch, Rfl: 1     butalbital-acetaminophen-caffeine (FIORICET/ESGIC) -40 MG per tablet, TAKE ONE TABLET BY MOUTH EVERY 4 HOURS AS NEEDED, Disp: , Rfl: 0     cetirizine (ZYRTEC) 10 MG tablet, Take 10 mg by mouth daily , Disp: , Rfl:      COMPRESSION STOCKINGS, 1 each daily 20-30 mmHg Thigh Length measure and fit, color and style per patient preference. Antolin garcia per need., Disp: 3 each, Rfl: 3     DULoxetine (CYMBALTA) 30 MG EC capsule, Take 1 capsule (30 mg) by mouth daily, Disp: 60 capsule, Rfl: 1     eletriptan (RELPAX) 20 MG tablet, Take 1 tablet (20 mg) by mouth at onset of headache for migraine May repeat in 2 hours. Max 4 tablets/24 hours., Disp: 18 tablet, Rfl: 1     EPINEPHrine (EPIPEN/ADRENACLICK/OR ANY  BX GENERIC EQUIV) 0.3 MG/0.3ML injection 2-pack, Inject 0.3 mg into the muscle, Disp: , Rfl:      famotidine (PEPCID) 20 MG tablet, Take 1 tablet (20 mg) by mouth At Bedtime, Disp: 90 tablet, Rfl: 1     fluticasone (FLONASE) 50 MCG/ACT spray, Spray 2 sprays into both nostrils daily, Disp: 1 Bottle, Rfl: 3     fluticasone-salmeterol (ADVAIR DISKUS) 250-50 MCG/DOSE diskus inhaler, Inhale 1 puff into the lungs 2 times daily as needed , Disp: , Rfl:      levothyroxine (SYNTHROID) 25 MCG tablet, Take 25 mcg by mouth daily, Disp: , Rfl:      linaclotide (LINZESS) 145 MCG capsule, Take 1 capsule (145 mcg) by mouth every morning (before breakfast) Last refill until GI clinic follow up., Disp: 30 capsule, Rfl: 1     metoclopramide (REGLAN) 5 MG tablet, Take 1 tablet (5 mg) by mouth every 6 hours as needed, Disp: 20 tablet, Rfl: 1     midodrine (PROAMATINE) 5 MG tablet, Take 1 tablet (5 mg) by mouth 2 times daily Take 5mg in the morning and 5 mg 4-6 hours later. Do not take within 5 hours of laying down., Disp: 180 tablet, Rfl: 3     Multiple Vitamins-Minerals (MULTIVITAMIN PO), Take by mouth daily, Disp: , Rfl:      omeprazole (PRILOSEC) 20 MG CR capsule, Take 1 capsule (20 mg) by mouth daily, Disp: 90 capsule, Rfl: 2     ondansetron (ZOFRAN-ODT) 4 MG ODT tab, Take 1-2 tablets (4-8 mg) by mouth every 12 hours as needed for nausea, Disp: 60 tablet, Rfl: 1     ONE DAILY MULTIPLE VITAMIN OR, , Disp: , Rfl:      Probiotic Product (PROBIOTIC DAILY PO), Take by mouth 2 times daily 2 packets daily, Disp: , Rfl:      rizatriptan (MAXALT-MLT) 5 MG ODT tab, TAKE 1-2 TABLETS (5-10 MG) BY MOUTH AT ONSET OF HEADACHE FOR MIGRAINE (MAX 30 MG IN 24 HOURS), Disp: , Rfl: 6     SUMAtriptan (IMITREX STATDOSE) 6 MG/0.5ML pen injector kit, Inject 0.5 mLs (6 mg) Subcutaneous at onset of headache for migraine (may repeat once after 2 hrs) May repeat in 1 hour. Max 12 mg/24 hours., Disp: 2 kit, Rfl: 1     tiZANidine (ZANAFLEX) 4 MG tablet, Take 1  tablet (4 mg) by mouth 3 times daily as needed for muscle spasms, Disp: 90 tablet, Rfl: 1     traMADol (ULTRAM) 50 MG tablet, Take 1 tablet (50 mg) by mouth every 6 hours as needed for pain maximum 4 tablet(s) per day, Disp: 15 tablet, Rfl: 0     vitamin D (ERGOCALCIFEROL) 18909 UNIT capsule, , Disp: , Rfl:      zonisamide (ZONEGRAN) 25 MG capsule, Take 125mg daily for 2 weeks, then 150mg, Disp: 360 capsule, Rfl: 3    Allergies   Allergen Reactions     Bee Venom Anaphylaxis, Difficulty breathing, Palpitations, Shortness Of Breath and Visual Disturbance     Patient does carry Epi-Pen     No Clinical Screening - See Comments Anaphylaxis     Chicken-Derived Products (Egg) Diarrhea and Nausea     Patient will self monitor  Other reaction(s): Nausea  Patient will self monitor  Other reaction(s): GI intolerance     Gabapentin      Gluten Meal      Lactose Dermatitis and Diarrhea     Milk Digestant Nausea     Patient is aware of milk sensitivity, will manage own diet     Milk Protein Extract      Other reaction(s): GI intolerance\  EGGS      Topiramate      Wheat Diarrhea     Wheat Bran Nausea     Patient will self monitor  Other reaction(s): Nausea  Patient will self monitor       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     Chronic sinusitis 00/2007    Diagnosed with chronic pansinusitis 2016     Chronic tonsillitis 1980    Had tonsils removed 02/1981, rheumatic fever     Tom-Danlos disease      Gastroesophageal reflux disease 3/1/2017    I have large hiatal hernia     Hernia, abdominal 10/2016    Hiatal Hernia     Hiatal hernia 2016    Have adeline hiatel hernia     Hoarseness Unsure    It comes and goes     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/2015    Common with  Tom Danlos Syndrome     Migraines 1/1/2007    Unsure of exact date     Nasal polyps 2013    Were revoved 03/2017, benign     Other nervous system complications 1/2014    Autonomic nervous system disorder, neuralgia, neuropathy     Pneumonia Unsure    Have had pneumonia several times     Swelling, mass, or lump in head and neck 08/2016    Lymph node has been growing for last year     Thyroid disease 01/01/2008       Past Surgical History:   Procedure Laterality Date     ADENOIDECTOMY  1981     AS REPAIR OF NASAL SEPTUM       BACK SURGERY  12/09/2013  10/01/2014    Spinal fusion L4-S1, L5 moving 5/8ths in. Remove screws/rods     BIOPSY  01/01/2008 & 3/2017    mole biopsy, lymph node biopsy     COLONOSCOPY  3/2017    Colonoscopy and GI     NASAL/SINUS POLYPECTOMY  2017    Polyps were benign     TONSILLECTOMY       TONSILLECTOMY  1981     TUBAL LIGATION         Family History   Problem Relation Age of Onset     Connective Tissue Disorder Mother      eds     Connective Tissue Disorder Sister      eds     CANCER Father      Spinal tumor grew into spinal cord, went in brain     Migraines Father      DIABETES Maternal Grandmother      Elderly diabetes     HEART DISEASE Maternal Grandmother      Hypertension Maternal Grandmother      DIABETES Paternal Grandmother      Elderly diabetes     DIABETES Paternal Grandfather      Elderly diabetes     Asthma Sister      Pediatric Asthma     Migraines Sister      Asthma Son      Pediatric Asthma     Thyroid Disease Sister      ,     Migraines Sister      Migraines Sister      Breast Cancer No family hx of        Social History     Social History     Marital status:      Spouse name: Carry     Number of children: 5     Years of education: N/A     Occupational History     Not on file.     Social History Main Topics     Smoking status: Former Smoker     Packs/day: 0.20     Years: 10.00     Types: Cigarettes     Start date: 1/1/1991     Quit date: 12/1/2013     Smokeless  "tobacco: Never Used      Comment: I somked on and off during specified dates.     Alcohol use Yes      Comment: Less than 5 drinks per year     Drug use: No     Sexual activity: Yes     Partners: Male     Birth control/ protection: Female Surgical     Other Topics Concern     Parent/Sibling W/ Cabg, Mi Or Angioplasty Before 65f 55m? No     Social History Narrative    5 kids: 24, 23, 19, 13, 5 yo     works at [employer] for disability         Problem list and 13 point review of systems: is reviewed in Epic and is negative with the exception of those symptoms associated with HPI and PMH.      OBJECTIVE:   /83  Pulse 83  Temp 98.2  F (36.8  C) (Oral)  Resp 24  Ht 1.715 m (5' 7.5\")  Wt 88 kg (193 lb 14.4 oz)  SpO2 98%  Breastfeeding? No  BMI 29.92 kg/m2    Imaging:  These are the pertinent radiologist's findings from:  3/7/18 lumbar flexion-extension views  Postsurgical changes of interbody fusion L4-L5 and L5-S1. Mild disc  height loss at L3-4. No evidence of abnormal lumbar motion in  flexion/extension. Lower lumbar facet arthropathy.   MRI lumbar spine 4/20/18  With/without  L1-2: Small posterior disc bulge. No spinal canal or neural foraminal  stenosis..  L2-3: Small posterior disc bulge. No spinal canal or neural foraminal  stenosis..  L3-4: Small left eccentric posterior disc bulge partially narrows the  left lateral recess. Bilateral facet and ligamentum flavum thickening.  Mild spinal canal narrowing. Mild left neural foraminal narrowing. No  right neural foraminal stenosis.  L4-5: Postsurgical changes of interbody spinal fusion and left  hemilaminectomy. Left asymmetric posterior osteophyte mildly narrows  the left lateral recess. No spinal canal or neural foraminal stenosis.  L5-S1: Postsurgical changes of interbody fusion and left  hemilaminectomy. Facet hypertrophy bilaterally. Left asymmetric  posterior osteophyte mildly narrows the left lateral recess. No " spinal  canal or neural foraminal stenosis.  See the full report in EPIC/Media tab.  I personally reviewed the images with the patient.      Exam:  *   Well developed, well nourished female found seated comfortably in exam room chair.  Is able to sit and rise independently.    Unaccompanied  *  Mental Status  A&O X3.  Bright, alert, affable, interactive. Language fluid, fund of knowledge intact. Good historian.  Mood and affect congruent and WNL.  A little irritable at first presentation but then she relaxed and was very pleasant    *  Cranial Nerves  II: Able to read printed forms, VF full to gross confrontation.  III: IV, VI:  PERRLA, EOMI, No nystagmus, no ptosis.  V: Sensation intact in bilateral V1, V2, and V3. Jaw clench symmetric.    VII:  Intact to voice bilaterally.  IX:  Pushes tongue against bilateral cheeks.  X:  Palate elevates, uvula midline, phonation intact.  XI: Elevates shoulders, head turn intact.  XII: Tongue midline. No fasciculations.  *  Cerebellar:  Finger nose accurate. Heel shin intact.   Rapid alternating movements smooth.  *  Douglas-cranial:    No drift.  *  Cervical Spine:  DTR's at Biceps, Triceps, and Brachioradialis 2/4 and symmetric.  Sensation intact.    No Santiago's. No Phalen's.  No Tinel's. No fasciculations.   Muscle bulk and tone WNL.  Upper Extremity Strength.              RIGHT                LEFT     Deltoid              5/5                   5/5       Biceps              5/5                   5/5        Triceps              5/5                   5/5       Wrist Extensor              5/5                   5/5                     5/5                    5/5       Interossei              5/5                   5/5       EPL              5/5                    5/5       Pinch              5/5                  5/5          *  Lumbar Spine:    Able to heel and toe stand/walk.   DTR's Patellar and Achilles 1/4 and symmetric.   No fasciculations.  Muscle bulk and tone WNL.  No  Clonus.  Sensation intact.    Lower Extremity Strength                   RIGHT                   LEFT     Iliopsoas                    5/5                      5/5       Quad                    5/5                        5/5       Hamstring                      5/5                        5/5         Gastrocs                    5/5                        5/5       Tib. Anterior                     5/5                        5/5       EHL                      5/5                        5/5       Babinski                 Down                                      Down                   *  Structural Exam  Inspection of the spine reveals no scoliosis, exaggerated kyphosis or lordosis. Prior surgical scars are well healed. Head is balanced over the pelvis in the coronal and sagittal plane.    Cervical spine ROM full. No spasming. No tenderness to palpation.  No Spurling's or L'Hermitte's.   Lumbar ROM full.  Able to doff and don socks without difficulty. No spasming, no tenderness, no step offs. No SLR.  No SI tenderness. No trochanteric bursal tenderness. Negative Magdaleno's.   Feet are warm, intact dorsalis pedis pulses, pink, brisk capillary refill.  Gait narrow, smooth, no scissoring. No antalgia. Tandem gait intact.  Negative Romberg.   *  Sensory level intact.     ASSESSMENT/PLAN:  1. Spondylosis of lumbar region without myelopathy or radiculopathy      She has multiple areas of pain, numbness greater on the left than the right, and back pain radiating to both  Bilateral lateral thighs. Her MRI is fairly clean  But with the Tom Zuniga there is concerned about abnormal motion. Her prior standing dynamic views were unremarkable for motion. I will get full-length spine films to look at her overall balance Given the prior fusion I'm going to get a CT scan to assess the status.once that is complete I will have her come back.  With Tom Zuniga I am extremely hesitant to recommend surgical intervention. It is very much  it to adjust sword. So we will see what we've got when she comes back. I discussed all this frankly with her and I believe she is in general agreement with the philosophical take on this.    I answered all of his/her questions, which indicated good understanding of the situation.  He/She is willing to move forward with the recommendations. I supplied him/her with business cards and telephone numbers for ease of contact.   It's been a pleasure participating in the care of this nice patient. We thank you for your confidence in the [facility], Department of Neurosurgery.      Best Regards,    Aga Ang PA-C  [facility]  Department of Neurosurgery  Phone: [phone]  Fax: [phone]        Total time: 60 minutes with more than 30 minutes spent in direct face to face contact reviewing films, providing education, counseling, non-operative therapies,and indications for surgery as well as further follow up.    This note was generated using voice recognition software. While edited for content some inaccurate phrasing may be found.

## 2018-04-26 ENCOUNTER — RADIANT APPOINTMENT (OUTPATIENT)
Dept: CT IMAGING | Facility: CLINIC | Age: 48
End: 2018-04-26
Payer: MEDICARE

## 2018-04-26 ENCOUNTER — RADIANT APPOINTMENT (OUTPATIENT)
Dept: CT IMAGING | Facility: CLINIC | Age: 48
End: 2018-04-26
Attending: INTERNAL MEDICINE
Payer: MEDICARE

## 2018-04-26 ENCOUNTER — RADIANT APPOINTMENT (OUTPATIENT)
Dept: GENERAL RADIOLOGY | Facility: CLINIC | Age: 48
End: 2018-04-26
Attending: PHYSICIAN ASSISTANT
Payer: MEDICARE

## 2018-04-26 ENCOUNTER — OFFICE VISIT (OUTPATIENT)
Dept: NEUROSURGERY | Facility: CLINIC | Age: 48
End: 2018-04-26
Payer: MEDICARE

## 2018-04-26 VITALS
WEIGHT: 193.9 LBS | TEMPERATURE: 98.2 F | RESPIRATION RATE: 24 BRPM | HEIGHT: 68 IN | SYSTOLIC BLOOD PRESSURE: 122 MMHG | HEART RATE: 83 BPM | BODY MASS INDEX: 29.39 KG/M2 | OXYGEN SATURATION: 98 % | DIASTOLIC BLOOD PRESSURE: 83 MMHG

## 2018-04-26 DIAGNOSIS — Q79.60 EDS (EHLERS-DANLOS SYNDROME): ICD-10-CM

## 2018-04-26 DIAGNOSIS — Z13.220 SCREENING FOR HYPERLIPIDEMIA: ICD-10-CM

## 2018-04-26 DIAGNOSIS — M47.816 SPONDYLOSIS OF LUMBAR REGION WITHOUT MYELOPATHY OR RADICULOPATHY: ICD-10-CM

## 2018-04-26 DIAGNOSIS — Z11.4 SCREENING FOR HIV WITHOUT PRESENCE OF RISK FACTORS: ICD-10-CM

## 2018-04-26 DIAGNOSIS — R61 GENERALIZED HYPERHIDROSIS: ICD-10-CM

## 2018-04-26 DIAGNOSIS — R91.8 PULMONARY NODULES: ICD-10-CM

## 2018-04-26 DIAGNOSIS — M47.816 SPONDYLOSIS OF LUMBAR REGION WITHOUT MYELOPATHY OR RADICULOPATHY: Primary | ICD-10-CM

## 2018-04-26 LAB
CHOLEST SERPL-MCNC: 244 MG/DL
FSH SERPL-ACNC: 11 IU/L
HDLC SERPL-MCNC: 48 MG/DL
HIV 1+2 AB+HIV1 P24 AG SERPL QL IA: NONREACTIVE
IGA SERPL-MCNC: 113 MG/DL (ref 70–380)
IGG SERPL-MCNC: 845 MG/DL (ref 695–1620)
IGM SERPL-MCNC: 76 MG/DL (ref 60–265)
LDLC SERPL CALC-MCNC: 164 MG/DL
NONHDLC SERPL-MCNC: 196 MG/DL
TRIGL SERPL-MCNC: 156 MG/DL
TSH SERPL DL<=0.005 MIU/L-ACNC: 1.84 MU/L (ref 0.4–4)

## 2018-04-26 ASSESSMENT — ENCOUNTER SYMPTOMS
BOWEL INCONTINENCE: 0
COUGH DISTURBING SLEEP: 0
FLANK PAIN: 1
SEIZURES: 0
HYPERTENSION: 0
SYNCOPE: 1
HYPOTENSION: 1
POOR WOUND HEALING: 1
MYALGIAS: 1
BLOATING: 1
STIFFNESS: 1
TROUBLE SWALLOWING: 1
CHILLS: 1
HEMATURIA: 0
HEMOPTYSIS: 0
DYSURIA: 1
INCREASED ENERGY: 0
DIFFICULTY URINATING: 1
DIZZINESS: 1
COUGH: 1
SNORES LOUDLY: 1
DISTURBANCES IN COORDINATION: 0
MUSCLE CRAMPS: 1
SLEEP DISTURBANCES DUE TO BREATHING: 0
MEMORY LOSS: 1
DYSPNEA ON EXERTION: 1
VOMITING: 0
SINUS PAIN: 1
JOINT SWELLING: 1
LIGHT-HEADEDNESS: 1
DECREASED APPETITE: 1
DOUBLE VISION: 1
WEIGHT LOSS: 0
HALLUCINATIONS: 0
ALTERED TEMPERATURE REGULATION: 1
JAUNDICE: 0
MUSCLE WEAKNESS: 1
BLOOD IN STOOL: 0
EYE REDNESS: 1
TINGLING: 1
FEVER: 1
PARALYSIS: 0
RECTAL PAIN: 0
WHEEZING: 1
FATIGUE: 1
BACK PAIN: 1
HOARSE VOICE: 1
HEARTBURN: 0
NIGHT SWEATS: 1
SPUTUM PRODUCTION: 1
LOSS OF CONSCIOUSNESS: 1
SWOLLEN GLANDS: 1
WEAKNESS: 1
EYE PAIN: 0
DIARRHEA: 1
TREMORS: 1
NUMBNESS: 1
PALPITATIONS: 1
POSTURAL DYSPNEA: 1
ABDOMINAL PAIN: 1
ORTHOPNEA: 1
HEADACHES: 1
POLYDIPSIA: 1
NECK PAIN: 1
SMELL DISTURBANCE: 0
WEIGHT GAIN: 0
BRUISES/BLEEDS EASILY: 1
SKIN CHANGES: 0
ARTHRALGIAS: 1
CONSTIPATION: 0
POLYPHAGIA: 0
EXERCISE INTOLERANCE: 1
LEG PAIN: 1
TASTE DISTURBANCE: 1
SORE THROAT: 0
NECK MASS: 1
SHORTNESS OF BREATH: 1
NAUSEA: 1
SINUS CONGESTION: 1
EYE IRRITATION: 1
EYE WATERING: 1
NAIL CHANGES: 0
SPEECH CHANGE: 0

## 2018-04-26 ASSESSMENT — PAIN SCALES - GENERAL: PAINLEVEL: SEVERE PAIN (7)

## 2018-04-26 NOTE — TELEPHONE ENCOUNTER
Please call patient:  I reviewed her [facility] records from last spring.  She had a normal US and normal biopsy of the lymph node.  I don't think we necessarily need another imaging test, unless she feels the lymph node has grown or is changing. In this case, I believe we could start with an US to compare size with last year.  Let me know if she wants to do US and I can order.  Thanks,  Ellyn Rivera MD  Internal Medicine      CT 1/17  FINDINGS: There is no mucosal space lesion identified. Normal thyroid. There is a 2.3 x 1 cm left cervical level II lymph node that previously measured 1.4 x 0.8 cm. The mildly prominent bilateral cervical lymph nodes are otherwise unchanged.    4/4/17  A Neck:  Negative for malignant cells.  - Consistent with lymph node sampling.     Comments The flow cytometric analysis of this specimen ([id number]) shows no immunophenotypic evidence of leukemia or lymphoma. The findings could be consistent with a reactive lymph node.  Clinically correlate.

## 2018-04-26 NOTE — LETTER
4/26/2018       RE: Ada Welhc  [address] [place] [address]     Dear Colleague,    Thank you for referring your patient, Ada Welch, to the [facility] NEUROSURGERY at [facility]. Please see a copy of my visit note below.      Neurosurgery Clinic   Date of Visit: 4/26/2018    Dear Dr. Galeano    We were happy to see Ada Welch, a pleasant 47 year old year old female for back pain.    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 She suffers chronic headaches, neck pain and back pain. She was seen in neurosurgery clinic in November by Dr. Moncada to establish with neurosurgery, in that case for her cervical spine. She had undergone an EMG which was not diagnostic for radiculopathy but was notable for abnormal spontaneous activity restricted to the left cervical paraspinal muscles.On physical exam she was noted to have decreased finger abduction and  in the bilateral upper extremities. She had bilaterally positive Moe's.. Otherwise her exam was normal.  Cervical MRI was reasonably normal. No obvious stenoses at any level.  There were flexion-extension views with the MRI and there was hypermobility noted although there was extensive motion artifact. There were extensive degenerative changes most pronounced at C4/5, C5/6, and C6/7.. There was abutment of the cord but there was residual space within the canal. In general it was felt she had generalized cervicalgia, bilateral C8 radiculopathy without radiographic evidence and hypermobility due to her Tom Danlos. The plan was for flexion-extension views of the cervical and lumbar spine, to wear her collar p.r.n. But to wean out of it to avoid a resultant atrophy which was possibly contributing to her generalized cervicalgia, and refer her back to the pain clinic where occipital nerve blocks might be considered. Recommending against Botox injections.. Surgery was  not offered at that time. P.r.n. Follow-up    Since then you have seen her again, she spoke with you about the surgeries on her lumbar spine she had had in [place] and the more recent one here at the hands of Dr. Lucero. She has had ongoing numbness in the legs, specifically the left leg and a recent MRI showing some mild impingement of the exiting left L5 nerve root. She would like to have an opinion about that and so she has had another set of EMGs,this time on her lower extremities and done by . She presents for an opinion today.    EMGs bilateral lower extremities performed 4/5/18  Interpretation the EMG is normal. There is no EMG evidence for left or right lumbar/sacral radiculopathy. There is no evidence for generalized myotonic disorder.     She describes her pain as present in the low lumbar spine radiating to the bilateral upper buttocks and the bilateral lateral thighs to just above the level of the knees.it is constant achy and sharp stiff sore to the touch and pressure-like. It is a little better on medication since October. But any motion aggravates it. At worst it is 10/10 screaming out loud pain. At best it drops to 4/10 and that is when she is lying still on her right side.    She has numbness present constantly in the left lateral anterior shin, described as tingling. She also has numbness from the waist down occasionally when she sits on the toilet.  It is not clear what particular instances bring that on. When this numbness appears she also experiences weakness of the bilateral legs. Again, it is randomly appearing, not related to any particular activity, not related to for example straining at stool or any other incident. This is also associated with difficulty with coordination and balance during most times.However she denies change in bowel or bladder control.  Patient Supplied Answers To the [facility] Pain Questionnaire  [facility] Pain -  Patient Entered Questionnaire/Answers 4/26/2018   What  number best describes your pain right now:  0 = No pain  to  10 = Worst pain imaginable 6   How would you describe the pain? burning, sharp, numbness, cutting, dull, aching, throbbing, pressure   Which of the following worsen your pain? standing, sitting, walking, exercise, coughing / sneezing   Which of the following improve or reduce your pain?  lying down, medication   What number best describes your average pain for the past week:  0 = No pain  to  10 = Worst pain imaginable 6   What number best describes your LOWEST pain in past 24 hours:  0 = No pain  to  10 = Worst pain imaginable 4   What number best describes your WORST pain in past 24 hours:  0 = No pain  to  10 = Worst pain imaginable 7   When is your pain worst? Constant   What non-medicine treatments have you already had for your pain? pain clinic, physical therapy, relaxation training, acupuncture, chiropractic care, TENS (electrical stimulator), spine injections (shots), other nerve blocks, counseling, surgery, exercise, other   Have you tried treating your pain with medication?  Yes   Are you currently taking medications for your pain? Yes       Current Outpatient Prescriptions:      albuterol (PROAIR HFA/PROVENTIL HFA/VENTOLIN HFA) 108 (90 BASE) MCG/ACT Inhaler, Inhale 2 puffs into the lungs, Disp: , Rfl:      amitriptyline (ELAVIL) 10 MG tablet, Take 3 tablets (30 mg) by mouth At Bedtime, Disp: 270 tablet, Rfl: 3     buprenorphine (BUTRANS) 10 MCG/HR WK patch, Place 1 patch onto the skin every 7 days, Disp: 4 patch, Rfl: 1     butalbital-acetaminophen-caffeine (FIORICET/ESGIC) -40 MG per tablet, TAKE ONE TABLET BY MOUTH EVERY 4 HOURS AS NEEDED, Disp: , Rfl: 0     cetirizine (ZYRTEC) 10 MG tablet, Take 10 mg by mouth daily , Disp: , Rfl:      COMPRESSION STOCKINGS, 1 each daily 20-30 mmHg Thigh Length measure and fit, color and style per patient preference. Antolin garcia per need., Disp: 3 each, Rfl: 3     DULoxetine (CYMBALTA) 30 MG EC  capsule, Take 1 capsule (30 mg) by mouth daily, Disp: 60 capsule, Rfl: 1     eletriptan (RELPAX) 20 MG tablet, Take 1 tablet (20 mg) by mouth at onset of headache for migraine May repeat in 2 hours. Max 4 tablets/24 hours., Disp: 18 tablet, Rfl: 1     EPINEPHrine (EPIPEN/ADRENACLICK/OR ANY BX GENERIC EQUIV) 0.3 MG/0.3ML injection 2-pack, Inject 0.3 mg into the muscle, Disp: , Rfl:      famotidine (PEPCID) 20 MG tablet, Take 1 tablet (20 mg) by mouth At Bedtime, Disp: 90 tablet, Rfl: 1     fluticasone (FLONASE) 50 MCG/ACT spray, Spray 2 sprays into both nostrils daily, Disp: 1 Bottle, Rfl: 3     fluticasone-salmeterol (ADVAIR DISKUS) 250-50 MCG/DOSE diskus inhaler, Inhale 1 puff into the lungs 2 times daily as needed , Disp: , Rfl:      levothyroxine (SYNTHROID) 25 MCG tablet, Take 25 mcg by mouth daily, Disp: , Rfl:      linaclotide (LINZESS) 145 MCG capsule, Take 1 capsule (145 mcg) by mouth every morning (before breakfast) Last refill until GI clinic follow up., Disp: 30 capsule, Rfl: 1     metoclopramide (REGLAN) 5 MG tablet, Take 1 tablet (5 mg) by mouth every 6 hours as needed, Disp: 20 tablet, Rfl: 1     midodrine (PROAMATINE) 5 MG tablet, Take 1 tablet (5 mg) by mouth 2 times daily Take 5mg in the morning and 5 mg 4-6 hours later. Do not take within 5 hours of laying down., Disp: 180 tablet, Rfl: 3     Multiple Vitamins-Minerals (MULTIVITAMIN PO), Take by mouth daily, Disp: , Rfl:      omeprazole (PRILOSEC) 20 MG CR capsule, Take 1 capsule (20 mg) by mouth daily, Disp: 90 capsule, Rfl: 2     ondansetron (ZOFRAN-ODT) 4 MG ODT tab, Take 1-2 tablets (4-8 mg) by mouth every 12 hours as needed for nausea, Disp: 60 tablet, Rfl: 1     ONE DAILY MULTIPLE VITAMIN OR, , Disp: , Rfl:      Probiotic Product (PROBIOTIC DAILY PO), Take by mouth 2 times daily 2 packets daily, Disp: , Rfl:      rizatriptan (MAXALT-MLT) 5 MG ODT tab, TAKE 1-2 TABLETS (5-10 MG) BY MOUTH AT ONSET OF HEADACHE FOR MIGRAINE (MAX 30 MG IN 24  HOURS), Disp: , Rfl: 6     SUMAtriptan (IMITREX STATDOSE) 6 MG/0.5ML pen injector kit, Inject 0.5 mLs (6 mg) Subcutaneous at onset of headache for migraine (may repeat once after 2 hrs) May repeat in 1 hour. Max 12 mg/24 hours., Disp: 2 kit, Rfl: 1     tiZANidine (ZANAFLEX) 4 MG tablet, Take 1 tablet (4 mg) by mouth 3 times daily as needed for muscle spasms, Disp: 90 tablet, Rfl: 1     traMADol (ULTRAM) 50 MG tablet, Take 1 tablet (50 mg) by mouth every 6 hours as needed for pain maximum 4 tablet(s) per day, Disp: 15 tablet, Rfl: 0     vitamin D (ERGOCALCIFEROL) 11498 UNIT capsule, , Disp: , Rfl:      zonisamide (ZONEGRAN) 25 MG capsule, Take 125mg daily for 2 weeks, then 150mg, Disp: 360 capsule, Rfl: 3    Allergies   Allergen Reactions     Bee Venom Anaphylaxis, Difficulty breathing, Palpitations, Shortness Of Breath and Visual Disturbance     Patient does carry Epi-Pen     No Clinical Screening - See Comments Anaphylaxis     Chicken-Derived Products (Egg) Diarrhea and Nausea     Patient will self monitor  Other reaction(s): Nausea  Patient will self monitor  Other reaction(s): GI intolerance     Gabapentin      Gluten Meal      Lactose Dermatitis and Diarrhea     Milk Digestant Nausea     Patient is aware of milk sensitivity, will manage own diet     Milk Protein Extract      Other reaction(s): GI intolerance\  EGGS      Topiramate      Wheat Diarrhea     Wheat Bran Nausea     Patient will self monitor  Other reaction(s): Nausea  Patient will self monitor       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     Chronic sinusitis 00/2007    Diagnosed with chronic pansinusitis 2016     Chronic tonsillitis 1980    Had tonsils removed 02/1981, rheumatic fever      Tom-Danlos disease      Gastroesophageal reflux disease 3/1/2017    I have large hiatal hernia     Hernia, abdominal 10/2016    Hiatal Hernia     Hiatal hernia 2016    Have adeline hiatel hernia     Hoarseness Unsure    It comes and goes     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 Remove screws/rods     BIOPSY  01/01/2008 & 3/2017    mole biopsy, lymph node biopsy     COLONOSCOPY  3/2017    Colonoscopy and GI     NASAL/SINUS POLYPECTOMY  2017    Polyps were benign     TONSILLECTOMY       TONSILLECTOMY  1981     TUBAL LIGATION         Family History   Problem Relation Age of Onset     Connective Tissue Disorder Mother      eds     Connective Tissue Disorder Sister      eds     CANCER Father      Spinal tumor grew into spinal cord, went in brain     Migraines Father      DIABETES Maternal Grandmother      Elderly diabetes     HEART DISEASE Maternal Grandmother      Hypertension Maternal Grandmother      DIABETES Paternal Grandmother      Elderly diabetes     DIABETES Paternal Grandfather      Elderly diabetes     Asthma Sister      Pediatric Asthma     Migraines Sister      Asthma Son      Pediatric Asthma     Thyroid Disease Sister      ,     Migraines Sister      Migraines Sister      Breast Cancer No family hx of        Social History     Social History     Marital status:      Spouse name:  "Carry     Number of children: 5     Years of education: N/A     Occupational History     Not on file.     Social History Main Topics     Smoking status: Former Smoker     Packs/day: 0.20     Years: 10.00     Types: Cigarettes     Start date: 1/1/1991     Quit date: 12/1/2013     Smokeless tobacco: Never Used      Comment: I somked on and off during specified dates.     Alcohol use Yes      Comment: Less than 5 drinks per year     Drug use: No     Sexual activity: Yes     Partners: Male     Birth control/ protection: Female Surgical     Other Topics Concern     Parent/Sibling W/ Cabg, Mi Or Angioplasty Before 65f 55m? No     Social History Narrative    5 kids: 24, 23, 19, 13, 7 yo     works at [employer] for disability         Problem list and 13 point review of systems: is reviewed in Epic and is negative with the exception of those symptoms associated with HPI and PMH.      OBJECTIVE:   /83  Pulse 83  Temp 98.2  F (36.8  C) (Oral)  Resp 24  Ht 1.715 m (5' 7.5\")  Wt 88 kg (193 lb 14.4 oz)  SpO2 98%  Breastfeeding? No  BMI 29.92 kg/m2    Imaging:  These are the pertinent radiologist's findings from:  3/7/18 lumbar flexion-extension views  Postsurgical changes of interbody fusion L4-L5 and L5-S1. Mild disc  height loss at L3-4. No evidence of abnormal lumbar motion in  flexion/extension. Lower lumbar facet arthropathy.   MRI lumbar spine 4/20/18  With/without  L1-2: Small posterior disc bulge. No spinal canal or neural foraminal  stenosis..  L2-3: Small posterior disc bulge. No spinal canal or neural foraminal  stenosis..  L3-4: Small left eccentric posterior disc bulge partially narrows the  left lateral recess. Bilateral facet and ligamentum flavum thickening.  Mild spinal canal narrowing. Mild left neural foraminal narrowing. No  right neural foraminal stenosis.  L4-5: Postsurgical changes of interbody spinal fusion and left  hemilaminectomy. Left asymmetric posterior " osteophyte mildly narrows  the left lateral recess. No spinal canal or neural foraminal stenosis.  L5-S1: Postsurgical changes of interbody fusion and left  hemilaminectomy. Facet hypertrophy bilaterally. Left asymmetric  posterior osteophyte mildly narrows the left lateral recess. No spinal  canal or neural foraminal stenosis.  See the full report in EPIC/Media tab.  I personally reviewed the images with the patient.      Exam:  *   Well developed, well nourished female found seated comfortably in exam room chair.  Is able to sit and rise independently.    Unaccompanied  *  Mental Status  A&O X3.  Bright, alert, affable, interactive. Language fluid, fund of knowledge intact. Good historian.  Mood and affect congruent and WNL.  A little irritable at first presentation but then she relaxed and was very pleasant    *  Cranial Nerves  II: Able to read printed forms, VF full to gross confrontation.  III: IV, VI:  PERRLA, EOMI, No nystagmus, no ptosis.  V: Sensation intact in bilateral V1, V2, and V3. Jaw clench symmetric.    VII:  Intact to voice bilaterally.  IX:  Pushes tongue against bilateral cheeks.  X:  Palate elevates, uvula midline, phonation intact.  XI: Elevates shoulders, head turn intact.  XII: Tongue midline. No fasciculations.  *  Cerebellar:  Finger nose accurate. Heel shin intact.   Rapid alternating movements smooth.  *  Dougals-cranial:    No drift.  *  Cervical Spine:  DTR's at Biceps, Triceps, and Brachioradialis 2/4 and symmetric.  Sensation intact.    No Santiago's. No Phalen's.  No Tinel's. No fasciculations.   Muscle bulk and tone WNL.  Upper Extremity Strength.              RIGHT                LEFT     Deltoid              5/5                   5/5       Biceps              5/5                   5/5        Triceps              5/5                   5/5       Wrist Extensor              5/5                   5/5                     5/5                    5/5       Interossei              5/5                    5/5       EPL              5/5                    5/5       Pinch              5/5                  5/5          *  Lumbar Spine:    Able to heel and toe stand/walk.   DTR's Patellar and Achilles 1/4 and symmetric.   No fasciculations.  Muscle bulk and tone WNL.  No Clonus.  Sensation intact.    Lower Extremity Strength                   RIGHT                   LEFT     Iliopsoas                    5/5                      5/5       Quad                    5/5                        5/5       Hamstring                      5/5                        5/5         Gastrocs                    5/5                        5/5       Tib. Anterior                     5/5                        5/5       EHL                      5/5                        5/5       Babinski                 Down                                      Down                   *  Structural Exam  Inspection of the spine reveals no scoliosis, exaggerated kyphosis or lordosis. Prior surgical scars are well healed. Head is balanced over the pelvis in the coronal and sagittal plane.    Cervical spine ROM full. No spasming. No tenderness to palpation.  No Spurling's or L'Hermitte's.   Lumbar ROM full.  Able to doff and don socks without difficulty. No spasming, no tenderness, no step offs. No SLR.  No SI tenderness. No trochanteric bursal tenderness. Negative Magdaleno's.   Feet are warm, intact dorsalis pedis pulses, pink, brisk capillary refill.  Gait narrow, smooth, no scissoring. No antalgia. Tandem gait intact.  Negative Romberg.   *  Sensory level intact.     ASSESSMENT/PLAN:  1. Spondylosis of lumbar region without myelopathy or radiculopathy      She has multiple areas of pain, numbness greater on the left than the right, and back pain radiating to both  Bilateral lateral thighs. Her MRI is fairly clean  But with the Tom Danlos there is concerned about abnormal motion. Her prior standing dynamic views were unremarkable for motion. I  will get full-length spine films to look at her overall balance Given the prior fusion I'm going to get a CT scan to assess the status.once that is complete I will have her come back.  With Tom Zuniga I am extremely hesitant to recommend surgical intervention. It is very much it to adjust sword. So we will see what we've got when she comes back. I discussed all this frankly with her and I believe she is in general agreement with the philosophical take on this.    I answered all of his/her questions, which indicated good understanding of the situation.  He/She is willing to move forward with the recommendations. I supplied him/her with business cards and telephone numbers for ease of contact.   It's been a pleasure participating in the care of this nice patient. We thank you for your confidence in the [facility], Department of Neurosurgery.      Total time: 60 minutes with more than 30 minutes spent in direct face to face contact reviewing films, providing education, counseling, non-operative therapies,and indications for surgery as well as further follow up.    This note was generated using voice recognition software. While edited for content some inaccurate phrasing may be found.      Again, thank you for allowing me to participate in the care of your patient.      Sincerely,    Aga Ang PA-C

## 2018-04-26 NOTE — MR AVS SNAPSHOT
After Visit Summary   4/26/2018    Ada Welch    MRN: [id number]           Patient Information     Date Of Birth          1970        Visit Information        Provider Department      4/26/2018 11:00 AM Aga Ang PA-C [facility]        Today's Diagnoses     Spondylosis of lumbar region without myelopathy or radiculopathy    -  1       Follow-ups after your visit        Your next 10 appointments already scheduled     May 01, 2018 10:00 AM CDT   (Arrive by 9:45 AM)   New Patient Visit with Noemy Nguyen MD   [facility] ([facility] [place])    [address] [place] [id number]   [phone]            May 03, 2018 10:30 AM CDT   New Visit with Ruth Esparza PT   [facility] ([facility])    [address] [address] [address]   [phone]            May 03, 2018  6:00 PM CDT   (Arrive by 5:45 PM)   Return Visit with Aga Ang PA-C   [facility] ([facility] [place])    [address] [place] [id number] [phone]            May 18, 2018  5:00 PM CDT   (Arrive by 4:45 PM)   Return Visit with Seth Galeano MD   [facility] ([facility] [place])    [address] [place] [id number] [phone]            Jun 07, 2018  9:30 AM CDT   Ech Complete with [email or web address]   [facility] ([facility] [place])    [address] [place] [id number] [phone]           1. Please bring or wear a comfortable two-piece outfit. 2. You may eat, drink and take your normal medicines. 3. For any questions that cannot be answered, please contact the ordering physician            Jun 07, 2018 10:30 AM CDT   (Arrive by 10:15 AM)   Implantable Loop Recorder with Uc Cv Device 1   [facility] ([facility]  [facility] [place])    [address] [place] [address]  [address] [place] [address]   [phone]            Jun 07, 2018 11:00 AM CDT   (Arrive by 10:45 AM)   RETURN ARRHYTHMIA with FIDELINA Mora CNP   [facility] ([facility])    [address] [place] [address]  [address] [place] [id number]   [phone]            Jul 11, 2018 11:15 AM CDT   (Arrive by 11:00 AM)   Return Visit with Wade Singh MD   [facility] ([facility])    [address] [place] [address]  [address] [facility] [id number] [phone]            Jul 23, 2018 12:15 PM CDT   (Arrive by 12:00 PM)   New Allergy with Vahe Rodriguez MD   [facility] ([facility])    [address] [place] [id number] [phone]           Do not take anti-histamines or Zantac for seven day prior to your appointment.            Oct 25, 2018  9:25 AM CDT   (Arrive by 9:10 AM)   Return Visit with Ellyn Rivera MD   [facility] ([facility])    [address] [place] [id number] [phone]              Who to contact     Please call your clinic at [phone] to:    Ask questions about your health    Make or cancel appointments    Discuss your medicines    Learn about your test results    Speak to your doctor            Additional Information About Your Visit        Psonar Information     P[employer]ar gives you secure access to your electronic health record. If you see a primary care provider, you can also send messages to your care team and make appointments. If you have questions, please call your primary care clinic.  If you do not have a primary care provider, please call [phone] and they will assist you.      P[employer]ar is an electronic gateway that provides easy, online access to your  "medical records. With [employer], you can request a clinic appointment, read your test results, renew a prescription or communicate with your care team.     To access your existing account, please contact your [facility] or call [phone] for assistance.        Care EveryWhere ID     This is your Care EveryWhere ID. This could be used by other organizations to access your [facility] medical records  [id number]        Your Vitals Were     Pulse Temperature Respirations Height Pulse Oximetry Breastfeeding?    83 98.2  F (36.8  C) (Oral) 24 1.715 m (5' 7.5\") 98% No    BMI (Body Mass Index)                   29.92 kg/m2            Blood Pressure from Last 3 Encounters:   04/26/18 122/83   04/24/18 110/86   04/19/18 119/80    Weight from Last 3 Encounters:   04/26/18 88 kg (193 lb 14.4 oz)   04/24/18 87.5 kg (193 lb)   04/19/18 87.6 kg (193 lb 3.2 oz)              We Performed the Following     X-ray Spine complete (Cervicothoracolumbar AP and lateral - standing views preferred) [[id number]]        Primary Care Provider Office Phone # Fax #    Ellyn Rivera [phone]       [address] [address]        Equal Access to Services     CHRISTINA GUY : Hadii aad ku hadasho Soomaali, waaxda luqadaha, qaybta kaalmada adeegyada, odette tang. So [facility] [phone].    ATENCIÓN: Si habla español, tiene a sweeney disposición servicios gratuitos de asistencia lingüística. Llame al [phone].    We comply with applicable federal civil rights laws and [place] laws. We do not discriminate on the basis of race, color, national origin, age, disability, sex, sexual orientation, or gender identity.            Thank you!     Thank you for choosing [facility]  for your care. Our goal is always to provide you with excellent care. Hearing back from our patients is one way we can continue to improve our services. Please take a few " minutes to complete the written survey that you may receive in the mail after your visit with us. Thank you!             Your Updated Medication List - Protect others around you: Learn how to safely use, store and throw away your medicines at www.disposemymeds.org.          This list is accurate as of 4/26/18 10:01 PM.  Always use your most recent med list.                   Brand Name Dispense Instructions for use Diagnosis    ADVAIR DISKUS 250-50 MCG/DOSE diskus inhaler   Generic drug:  fluticasone-salmeterol      Inhale 1 puff into the lungs 2 times daily as needed        albuterol 108 (90 Base) MCG/ACT Inhaler    PROAIR HFA/PROVENTIL HFA/VENTOLIN HFA     Inhale 2 puffs into the lungs        amitriptyline 10 MG tablet    ELAVIL    270 tablet    Take 3 tablets (30 mg) by mouth At Bedtime    Chronic migraine without aura without status migrainosus, not intractable       buprenorphine 10 MCG/HR WK patch    BUTRANS    4 patch    Place 1 patch onto the skin every 7 days    Postlaminectomy syndrome of lumbar region       butalbital-acetaminophen-caffeine -40 MG per tablet    FIORICET/ESGIC     TAKE ONE TABLET BY MOUTH EVERY 4 HOURS AS NEEDED        cetirizine 10 MG tablet    zyrTEC     Take 10 mg by mouth daily        COMPRESSION STOCKINGS     3 each    1 each daily 20-30 mmHg Thigh Length measure and fit, color and style per patient preference. Doshar n jose per need.    Orthostatic hypotension       DULoxetine 30 MG EC capsule    CYMBALTA    60 capsule    Take 1 capsule (30 mg) by mouth daily    Lumbar post-laminectomy syndrome       eletriptan 20 MG tablet    RELPAX    18 tablet    Take 1 tablet (20 mg) by mouth at onset of headache for migraine May repeat in 2 hours. Max 4 tablets/24 hours.    Chronic daily headache, Intractable migraine without aura and with status migrainosus       EPINEPHrine 0.3 MG/0.3ML injection 2-pack    EPIPEN/ADRENACLICK/or ANY BX GENERIC EQUIV     Inject 0.3 mg into the muscle         famotidine 20 MG tablet    PEPCID    90 tablet    Take 1 tablet (20 mg) by mouth At Bedtime    Hiatal hernia, Mast cell disorder       fluticasone 50 MCG/ACT spray    FLONASE    1 Bottle    Spray 2 sprays into both nostrils daily    Chronic rhinitis, unspecified type       linaclotide 145 MCG capsule    LINZESS    30 capsule    Take 1 capsule (145 mcg) by mouth every morning (before breakfast) Last refill until GI clinic follow up.    Chronic idiopathic constipation       metoclopramide 5 MG tablet    REGLAN    20 tablet    Take 1 tablet (5 mg) by mouth every 6 hours as needed    Intractable chronic migraine without aura and with status migrainosus       midodrine 5 MG tablet    PROAMATINE    180 tablet    Take 1 tablet (5 mg) by mouth 2 times daily Take 5mg in the morning and 5 mg 4-6 hours later. Do not take within 5 hours of laying down.    Orthostatic hypotension       MULTIVITAMIN PO      Take by mouth daily        omeprazole 20 MG CR capsule    priLOSEC    90 capsule    Take 1 capsule (20 mg) by mouth daily    Hiatal hernia       ondansetron 4 MG ODT tab    ZOFRAN-ODT    60 tablet    Take 1-2 tablets (4-8 mg) by mouth every 12 hours as needed for nausea    Migraine without status migrainosus, not intractable, unspecified migraine type       ONE DAILY MULTIPLE VITAMIN OR           PROBIOTIC DAILY PO      Take by mouth 2 times daily 2 packets daily        rizatriptan 5 MG ODT tab    MAXALT-MLT     TAKE 1-2 TABLETS (5-10 MG) BY MOUTH AT ONSET OF HEADACHE FOR MIGRAINE (MAX 30 MG IN 24 HOURS)        SUMAtriptan 6 MG/0.5ML pen injector kit    IMITREX STATDOSE    2 kit    Inject 0.5 mLs (6 mg) Subcutaneous at onset of headache for migraine (may repeat once after 2 hrs) May repeat in 1 hour. Max 12 mg/24 hours.    Chronic migraine without aura without status migrainosus, not intractable       SYNTHROID 25 MCG tablet   Generic drug:  levothyroxine      Take 25 mcg by mouth daily        tiZANidine 4 MG tablet     ZANAFLEX    90 tablet    Take 1 tablet (4 mg) by mouth 3 times daily as needed for muscle spasms    Post laminectomy syndrome       traMADol 50 MG tablet    ULTRAM    15 tablet    Take 1 tablet (50 mg) by mouth every 6 hours as needed for pain maximum 4 tablet(s) per day    Lumbar post-laminectomy syndrome       vitamin D 52116 UNIT capsule    ERGOCALCIFEROL          zonisamide 25 MG capsule    Zonegran    360 capsule    Take 125mg daily for 2 weeks, then 150mg    Occipital neuritis

## 2018-04-30 ASSESSMENT — ENCOUNTER SYMPTOMS
TREMORS: 1
SPEECH CHANGE: 0
POSTURAL DYSPNEA: 1
WEIGHT LOSS: 1
MUSCLE CRAMPS: 1
WEAKNESS: 1
PALPITATIONS: 1
NAUSEA: 1
SNORES LOUDLY: 1
SWOLLEN GLANDS: 1
BACK PAIN: 1
COUGH: 1
MYALGIAS: 1
LEG PAIN: 1
BRUISES/BLEEDS EASILY: 1
FLANK PAIN: 1
DECREASED APPETITE: 1
HALLUCINATIONS: 0
DIZZINESS: 1
JOINT SWELLING: 1
SHORTNESS OF BREATH: 1
POLYDIPSIA: 1
LOSS OF CONSCIOUSNESS: 1
SKIN CHANGES: 1
BOWEL INCONTINENCE: 0
DISTURBANCES IN COORDINATION: 1
HEADACHES: 1
SEIZURES: 0
JAUNDICE: 0
POOR WOUND HEALING: 1
CONSTIPATION: 1
MUSCLE WEAKNESS: 1
HEMOPTYSIS: 0
SPUTUM PRODUCTION: 1
WHEEZING: 1
DIFFICULTY URINATING: 1
HYPERTENSION: 0
ALTERED TEMPERATURE REGULATION: 1
TINGLING: 1
LIGHT-HEADEDNESS: 1
BLOATING: 1
DOUBLE VISION: 1
HYPOTENSION: 1
HEARTBURN: 1
SLEEP DISTURBANCES DUE TO BREATHING: 1
EYE IRRITATION: 1
NIGHT SWEATS: 1
EYE WATERING: 1
MEMORY LOSS: 1
EYE PAIN: 1
ABDOMINAL PAIN: 1
COUGH DISTURBING SLEEP: 1
HOT FLASHES: 0
CHILLS: 1
SINUS PAIN: 1
WEIGHT GAIN: 1
ORTHOPNEA: 1
TASTE DISTURBANCE: 1
NUMBNESS: 1
DECREASED LIBIDO: 0
EYE REDNESS: 1
PARALYSIS: 0
EXERCISE INTOLERANCE: 1
VOMITING: 0
DIARRHEA: 1
FATIGUE: 1
STIFFNESS: 1
INCREASED ENERGY: 1
DYSURIA: 1
ARTHRALGIAS: 1
TROUBLE SWALLOWING: 1
FEVER: 1
SORE THROAT: 1
NECK MASS: 1
RECTAL PAIN: 0
NECK PAIN: 1
BLOOD IN STOOL: 0
NAIL CHANGES: 1
HOARSE VOICE: 1
SMELL DISTURBANCE: 1
DYSPNEA ON EXERTION: 1
POLYPHAGIA: 0
HEMATURIA: 0
SINUS CONGESTION: 1
SYNCOPE: 0

## 2018-05-01 ENCOUNTER — OFFICE VISIT (OUTPATIENT)
Dept: NEUROLOGY | Facility: CLINIC | Age: 48
End: 2018-05-01
Attending: PSYCHIATRY & NEUROLOGY
Payer: MEDICARE

## 2018-05-01 VITALS
DIASTOLIC BLOOD PRESSURE: 89 MMHG | HEART RATE: 97 BPM | HEIGHT: 68 IN | SYSTOLIC BLOOD PRESSURE: 151 MMHG | BODY MASS INDEX: 29.1 KG/M2 | WEIGHT: 192 LBS

## 2018-05-01 DIAGNOSIS — G43.719 INTRACTABLE CHRONIC MIGRAINE WITHOUT AURA AND WITHOUT STATUS MIGRAINOSUS: Primary | ICD-10-CM

## 2018-05-01 RX ORDER — METOCLOPRAMIDE 5 MG/1
TABLET ORAL
Qty: 60 TABLET | Refills: 3 | Status: SHIPPED | OUTPATIENT
Start: 2018-05-01 | End: 2018-07-07

## 2018-05-01 ASSESSMENT — PAIN SCALES - GENERAL: PAINLEVEL: SEVERE PAIN (7)

## 2018-05-01 NOTE — PROGRESS NOTES
[facility]    Ada Welch MRN# [id number]   Age: 47 year old YOB: 1970     Requesting physician: Ellyn Fonseca     Chief Complaint:    Referred by Dr Galeano for headache management.    History of Present Illness:  Ada Welch is a 47-year-old woman with a diagnosis of the Tom Danlos syndrome.  In that setting she has chronic pain and chronic headaches.  She has been referred by Dr. Galeano to discuss her different headaches in the management.    She describes 3 types of headaches:  1. Migraines: she has sensitivity to light, sound. She has wavy vision. Pain in the back of the head and shoots to front of the of the head on right or left side.  She was getting these daily, zonisamide has helped and now 3-4 times a week. Treating them about 1-2 times a week with medicine rest of time she sleeps. Can last several days if not successfully treated right away or resolved by sleep.    2. Pressure back of head  All the time there is some level of pain and it comes from the neck being upright makes it worse.  She tried occipital nerve blocks and it made worse and she had dizziness and nausea.  She has tried PT. She goes to [facility] once a week. She has myofascial release and craniosacral treatments    3. Sinus headaches  She reports some level of pain all the time. It is a pressure sensation. She uses Flonase and rinses. Sometimes symptoms get worse. She has nasal drainage. She is allergic to wheat, milk and eggs. She avoids eggs, milk and wheat.     She is on amitriptyline and zonisamide or prevention. She has tried higher doses of zonisamide but doesn't tolerate it. She also takes duloxetine for pain management. She is also on medical cannabis.    She only uses tramadol during pain flares every 4-6 months.    She alternates use of rizatriptan and eletriptan. She will also use Sumatriptan injectable as a rescue but does not like. She averages about  8-9 a month.    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 She has midodrine but does not frequently take it because she has to stay upright for 5 hours after taking it.  All of her headaches become worse when upright.    The patient has previously tried occipital nerve blocks and that made her symptoms worse.  She is not interested in trying Botox because she is concerned it would increase risk of subluxation in her neck.  In the past she tried gabapentin and Lyrica which caused substantial weight gain.  The patient has topiramate on her allergy list but does not remember taking it.       Allergies   Allergen Reactions     Bee Venom Anaphylaxis, Difficulty breathing, Palpitations, Shortness Of Breath and Visual Disturbance     Patient does carry Epi-Pen     No Clinical Screening - See Comments Anaphylaxis     Chicken-Derived Products (Egg) Diarrhea and Nausea     Patient will self monitor  Other reaction(s): Nausea  Patient will self monitor  Other reaction(s): GI intolerance     Gabapentin      Gluten Meal      Lactose Dermatitis and Diarrhea     Milk Digestant Nausea     Patient is aware of milk sensitivity, will manage own diet     Milk Protein Extract      Other reaction(s): GI intolerance\  EGGS      Topiramate      Wheat Diarrhea     Wheat Bran Nausea     Patient will self monitor  Other reaction(s): Nausea  Patient will self monitor     Not sure what reaction to topiramate was, doesn't remember taking it  She reports lots of weight gain from gabapentin and Lyrica.    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     Chronic sinusitis 00/2007    Diagnosed with chronic pansinusitis 2016     Chronic tonsillitis 1980    Had tonsils removed 02/1981, rheumatic fever      Tom-Danlos disease      Gastroesophageal reflux disease 3/1/2017    I have large hiatal hernia     Hernia, abdominal 10/2016    Hiatal Hernia     Hiatal hernia 2016    Have adeline hiatel hernia     Hoarseness Unsure    It comes and goes     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     Migraines 1/1/2007    Unsure of exact date     Nasal polyps 2013    Were revoved 03/2017, benign     Other nervous system complications 1/2014    Autonomic nervous system disorder, neuralgia, neuropathy     Pneumonia Unsure    Have had pneumonia several times     Swelling, mass, or lump in head and neck 08/2016    Lymph node has been growing for last year     Thyroid disease 01/01/2008       Patient Active Problem List   Diagnosis     Pain syndrome, chronic     s/p TLIF L4-5,L5-S1 with solid arthrodesis     Tom-Danlos syndrome     Postlaminectomy syndrome, lumbar region     Headache     Cervicalgia     Slow transit constipation     Urinary urgency     Nocturia     Cardiac device in situ- [employer] Implanted Loop Recorder (ILR)     Acquired hypothyroidism     Allergic rhinitis     Anxiety     Anxiety disorder     Astigmatism     Chronic sinusitis with recurrent bronchitis     Arthritis of facet joints at multiple vertebral levels (H)     Arthritis involving multiple sites     Opioid dependence (H)     Cyst of fallopian tube     Depression     DNS (deviated nasal septum)     Dysuria     Easy bruising     Encounter for genetic counseling and testing     Essential hypertension with goal blood pressure less than 140/90     Head and neck lymphadenopathy     Hepatomegaly     Keratoconjunctivitis sicca (H)     Low back pain radiating to both legs     Liver lesion     Low back pain     Low back pain of multiple sites of spine with sciatica     Lumbar degenerative disc disease     Lumbar radiculopathy     Lymphadenopathy of left cervical region     Migraine without aura     Nasal turbinate hypertrophy     Non  compliance with medical treatment     Obese     Opioid type dependence, continuous (H)     Other acute postoperative pain     Pain disorder associated with psychological and physical factors     Presbyopia     Spondylolisthesis of lumbar region     Tachycardia     Uterine endometriosis       Past Surgical History:   Procedure Laterality Date     ADENOIDECTOMY  1981     AS REPAIR OF NASAL SEPTUM       BACK SURGERY  12/09/2013  10/01/2014    Spinal fusion L4-S1, L5 moving 5/8ths in. Remove screws/rods     BIOPSY  01/01/2008 & 3/2017    mole biopsy, lymph node biopsy     COLONOSCOPY  3/2017    Colonoscopy and GI     NASAL/SINUS POLYPECTOMY  2017    Polyps were benign     TONSILLECTOMY       TONSILLECTOMY  1981     TUBAL LIGATION         Social History     Social History     Marital status:      Spouse name: Carry     Number of children: 5     Years of education: N/A     Occupational History     Not on file.     Social History Main Topics     Smoking status: Former Smoker     Packs/day: 0.20     Years: 10.00     Types: Cigarettes     Start date: 1/1/1991     Quit date: 12/1/2013     Smokeless tobacco: Never Used      Comment: I somked on and off during specified dates.     Alcohol use Yes      Comment: Less than 5 drinks per year     Drug use: No     Sexual activity: Yes     Partners: Male     Birth control/ protection: Female Surgical     Other Topics Concern     Parent/Sibling W/ Cabg, Mi Or Angioplasty Before 65f 55m? No     Social History Narrative    5 kids: 24, 23, 19, 13, 5 yo     works at [employer] for disability           Family History   Problem Relation Age of Onset     Connective Tissue Disorder Mother      eds     Connective Tissue Disorder Sister      eds     CANCER Father      Spinal tumor grew into spinal cord, went in brain     Migraines Father      DIABETES Maternal Grandmother      Elderly diabetes     HEART DISEASE Maternal Grandmother      Hypertension Maternal  Grandmother      DIABETES Paternal Grandmother      Elderly diabetes     DIABETES Paternal Grandfather      Elderly diabetes     Asthma Sister      Pediatric Asthma     Migraines Sister      Asthma Son      Pediatric Asthma     Thyroid Disease Sister      ,     Migraines Sister      Migraines Sister      Breast Cancer No family hx of        Current Outpatient Prescriptions   Medication Sig     albuterol (PROAIR HFA/PROVENTIL HFA/VENTOLIN HFA) 108 (90 BASE) MCG/ACT Inhaler Inhale 2 puffs into the lungs     amitriptyline (ELAVIL) 10 MG tablet Take 3 tablets (30 mg) by mouth At Bedtime     buprenorphine (BUTRANS) 10 MCG/HR WK patch Place 1 patch onto the skin every 7 days     butalbital-acetaminophen-caffeine (FIORICET/ESGIC) -40 MG per tablet TAKE ONE TABLET BY MOUTH EVERY 4 HOURS AS NEEDED     cetirizine (ZYRTEC) 10 MG tablet Take 10 mg by mouth daily      COMPRESSION STOCKINGS 1 each daily 20-30 mmHg Thigh Length measure and fit, color and style per patient preference. Doff n jose per need.     DULoxetine (CYMBALTA) 30 MG EC capsule Take 1 capsule (30 mg) by mouth daily     eletriptan (RELPAX) 20 MG tablet Take 1 tablet (20 mg) by mouth at onset of headache for migraine May repeat in 2 hours. Max 4 tablets/24 hours.     EPINEPHrine (EPIPEN/ADRENACLICK/OR ANY BX GENERIC EQUIV) 0.3 MG/0.3ML injection 2-pack Inject 0.3 mg into the muscle     famotidine (PEPCID) 20 MG tablet Take 1 tablet (20 mg) by mouth At Bedtime     fluticasone (FLONASE) 50 MCG/ACT spray Spray 2 sprays into both nostrils daily     fluticasone-salmeterol (ADVAIR DISKUS) 250-50 MCG/DOSE diskus inhaler Inhale 1 puff into the lungs 2 times daily as needed      levothyroxine (SYNTHROID) 25 MCG tablet Take 25 mcg by mouth daily     linaclotide (LINZESS) 145 MCG capsule Take 1 capsule (145 mcg) by mouth every morning (before breakfast) Last refill until GI clinic follow up.     metoclopramide (REGLAN) 5 MG tablet Take 1 tablet (5 mg) by mouth every  "6 hours as needed     midodrine (PROAMATINE) 5 MG tablet Take 1 tablet (5 mg) by mouth 2 times daily Take 5mg in the morning and 5 mg 4-6 hours later. Do not take within 5 hours of laying down.     Multiple Vitamins-Minerals (MULTIVITAMIN PO) Take by mouth daily     omeprazole (PRILOSEC) 20 MG CR capsule Take 1 capsule (20 mg) by mouth daily     ondansetron (ZOFRAN-ODT) 4 MG ODT tab Take 1-2 tablets (4-8 mg) by mouth every 12 hours as needed for nausea     ONE DAILY MULTIPLE VITAMIN OR      Probiotic Product (PROBIOTIC DAILY PO) Take by mouth 2 times daily 2 packets daily     rizatriptan (MAXALT-MLT) 5 MG ODT tab TAKE 1-2 TABLETS (5-10 MG) BY MOUTH AT ONSET OF HEADACHE FOR MIGRAINE (MAX 30 MG IN 24 HOURS)     SUMAtriptan (IMITREX STATDOSE) 6 MG/0.5ML pen injector kit Inject 0.5 mLs (6 mg) Subcutaneous at onset of headache for migraine (may repeat once after 2 hrs) May repeat in 1 hour. Max 12 mg/24 hours.     tiZANidine (ZANAFLEX) 4 MG tablet Take 1 tablet (4 mg) by mouth 3 times daily as needed for muscle spasms     traMADol (ULTRAM) 50 MG tablet Take 1 tablet (50 mg) by mouth every 6 hours as needed for pain maximum 4 tablet(s) per day (Patient not taking: Reported on 5/1/2018)     vitamin D (ERGOCALCIFEROL) 71977 UNIT capsule      zonisamide (ZONEGRAN) 25 MG capsule Take 125mg daily for 2 weeks, then 150mg     No current facility-administered medications for this visit.             ROS: Please see HPI all other systems review and negative.    Physical Examination:  /89  Pulse 97  Ht 1.715 m (5' 7.5\")  Wt 87.1 kg (192 lb)  BMI 29.63 kg/m2  General Appearance:  The patient is awake and alert.  She is oriented in no acute distress  Neurological Examination  Cognition: oriented x3, attention and recall intact. No aphasia or dysarthria  Cranial Nerves: 2-12 intact. Funduscopic examination is normal with sharp disc margins bilaterally.   General Motor Survey: Normal muscle bulk, tone and strength in all four " ext. No tremor  Coordination: Finger to nose and heel knee shin normal bilaterally. Normal alternating movements.  Reflexes: Upper and lower extremity reflexes are within normal limits (+2) and bilaterally symmetric.     Cardiovascular Examination:  Heart is regular in rate and rhythm to auscultation. No significant murmurs. No carotid bruits. No significant peripheral edema. Pedal pulses are palpable bilaterally.     Musculoskeletal Examination:  Neck is supple with full range of motion. No tenderness to palpitations.        Impression/Recommendations:  1.  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  She does not wish to change anything from her combination of amitriptyline, duloxetine and zonisamide.  I did discuss with her that in the future if she wanted to try Botox injections we could inject and avoid the cervical paraspinals and therefore decrease the risk of increased subluxation.  I also discussed with her that sometime this year though be some new migraine treatments that are monoclonal antibodies against CG RP.  At that time she might be a good candidate to try those.  I have asked her to contact me at the beginning of September to touch base and see if we can get her started on those treatments.    I met with the patient for 45 minutes over 50% counseling regarding migraine management.  At this time she does ask for a Reglan prescription to help with some nausea she is having.  The patient was advised to use Benadryl coupled with the Reglan to treat migraines as needed.    MD ISACC CortesN  Department of Neurology            Answers for HPI/ROS submitted by the patient on 4/30/2018   General Symptoms: Yes  Skin Symptoms: Yes  HENT Symptoms: Yes  EYE SYMPTOMS: Yes  HEART SYMPTOMS: Yes  LUNG SYMPTOMS: Yes  INTESTINAL SYMPTOMS: Yes  URINARY SYMPTOMS: Yes  GYNECOLOGIC  SYMPTOMS: Yes  BREAST SYMPTOMS: No  SKELETAL SYMPTOMS: Yes  BLOOD SYMPTOMS: Yes  NERVOUS SYSTEM SYMPTOMS: Yes  MENTAL HEALTH SYMPTOMS: No  Fever: Yes  Loss of appetite: Yes  Weight loss: Yes  Weight gain: Yes  Fatigue: Yes  Night sweats: Yes  Chills: Yes  Increased stress: No  Excessive hunger: No  Excessive thirst: Yes  Feeling hot or cold when others believe the temperature is normal: Yes  Loss of height: No  Post-operative complications: Yes  Surgical site pain: Yes  Hallucinations: No  Change in or Loss of Energy: Yes  Hyperactivity: No  Confusion: Yes  Changes in hair: Yes  Changes in moles/birth marks: Yes  Itching: Yes  Rashes: Yes  Changes in nails: Yes  Acne: No  Hair in places you don't want it: No  Change in facial hair: No  Warts: No  Non-healing sores: Yes  Scarring: Yes  Flaking of skin: No  Color changes of hands/feet in cold : Yes  Sun sensitivity: Yes  Skin thickening: No  Ear pain: Yes  Ear discharge: Yes  Hearing loss: No  Tinnitus: Yes  Nosebleeds: No  Congestion: Yes  Sinus pain: Yes  Trouble swallowing: Yes   Voice hoarseness: Yes  Mouth sores: No  Sore throat: Yes  Tooth pain: No  Gum tenderness: No  Bleeding gums: No  Change in taste: Yes  Change in sense of smell: Yes  Dry mouth: Yes  Hearing aid used: No  Neck lump: Yes  Eye pain: Yes  Vision loss: Yes  Dry eyes: Yes  Watery eyes: Yes  Eye bulging: No  Double vision: Yes  Flashing of lights: Yes  Spots: Yes  Floaters: Yes  Redness: Yes  Crossed eyes: No  Tunnel Vision: Yes  Yellowing of eyes: No  Eye irritation: Yes  Cough: Yes  Sputum or phlegm: Yes  Coughing up blood: No  Difficulty breating or shortness of breath: Yes  Snoring: Yes  Wheezing: Yes  Difficulty breathing on exertion: Yes  Nighttime Cough: Yes  Difficulty breathing when lying flat: Yes  Chest pain or pressure: Yes  Fast or irregular heartbeat: Yes  Pain in legs with walking: Yes  Trouble breathing while lying down: Yes  Fingers or toes appear blue: Yes  High blood pressure:  No  Low blood pressure: Yes  Fainting: No  Murmurs: No  Pacemaker: No  Varicose veins: No  Edema or swelling: Yes  Wake up at night with shortness of breath: Yes  Light-headedness: Yes  Exercise intolerance: Yes  Heart burn or indigestion: Yes  Nausea: Yes  Vomiting: No  Abdominal pain: Yes  Bloating: Yes  Constipation: Yes  Diarrhea: Yes  Blood in stool: No  Black stools: No  Rectal or Anal pain: No  Fecal incontinence: No  Yellowing of skin or eyes: No  Vomit with blood: No  Change in stools: No  Trouble holding urine or incontinence: Yes  Pain or burning: Yes  Trouble starting or stopping: Yes  Increased frequency of urination: Yes  Blood in urine: No  Decreased frequency of urination: No  Frequent nighttime urination: Yes  Flank pain: Yes  Difficulty emptying bladder: Yes  Back pain: Yes  Muscle aches: Yes  Neck pain: Yes  Swollen joints: Yes  Joint pain: Yes  Bone pain: Yes  Muscle cramps: Yes  Muscle weakness: Yes  Joint stiffness: Yes  Bone fracture: No  Anemia: No  Swollen glands: Yes  Easy bleeding or bruising: Yes  Trouble with coordination: Yes  Dizziness or trouble with balance: Yes  Fainting or black-out spells: Yes  Memory loss: Yes  Headache: Yes  Seizures: No  Speech problems: No  Tingling: Yes  Tremor: Yes  Weakness: Yes  Difficulty walking: Yes  Paralysis: No  Numbness: Yes  Bleeding or spotting between periods: No  Heavy or painful periods: Yes  Irregular periods: No  Vaginal discharge: No  Hot flashes: No  Vaginal dryness: Yes  Genital ulcers: No  Reduced libido: No  Painful intercourse: No  Difficulty with sexual arousal: No  Post-menopausal bleeding: No

## 2018-05-01 NOTE — PATIENT INSTRUCTIONS
Hold on buying Cefaly.  Check out eNeura- wait and see how it does on the market for now    Touch base with Dr Nguyen about the new migraine medicine in the fall. Monoclonal antibodies against CGRP. First should be approved in May.     Try Reglan and Benadryl together to treat migraine

## 2018-05-01 NOTE — LETTER
5/1/2018       RE: Ada Welch  [address] [place] [address]     Dear Colleague,    Thank you for referring your patient, Ada Welch, to the [facility] NEUROLOGY at [facility]. Please see a copy of my visit note below.        [facility]    Ada Welch MRN# [id number]   Age: 47 year old YOB: 1970     Requesting physician: Ellyn Fonseca     Chief Complaint:    Referred by Dr Galeano for headache management.    History of Present Illness:  Ada Welch is a 47-year-old woman with a diagnosis of the lower Stamler syndrome.  In that setting she has chronic pain and chronic headaches.  She has been referred by Dr. Galeano to discuss her different headaches in the management.    She describes 3 types of headaches:  1. Migraines: she has sensitivity to light, sound. She has wavy vision. Pain in the back of the head and shoots to front of the of the head on right or left side.  She was getting these daily, zonisamide has helped and now 3-4 times a week. Treating them about 1-2 times a week with medicine rest of time she sleeps. Can last several days if not successfully treated right away or resolved by sleep.    2. Pressure back of head  All the time there is some level of pain and it comes from the neck being upright makes it worse.  She tried occipital nerve blocks and it made worse and she had dizziness and nausea.  She has tried PT. She goes to [facility] once a week. She has myofascial release and craniosacral treatments    3. Sinus headaches  She reports some level of pain all the time. It is a pressure sensation. She uses Flonase and rinses. Sometimes symptoms get worse. She has nasal drainage. She is allergic to wheat, milk and eggs. She avoids eggs, milk and wheat.     She is on amitriptyline and zonisamide or prevention. She has tried higher doses of zonisamide but doesn't tolerate it. She also  takes duloxetine for pain management. She is also on medical cannabis.    She only uses tramadol during pain flares every 4-6 months.    She alternates use of rizatriptan and eletriptan. She will also use Sumatriptan injectable as a rescue but does not like. She averages about 8-9 a month.    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 She has midodrine but does not frequently take it because she has to stay upright for 5 hours after taking it.  All of her headaches become worse when upright.    The patient has previously tried occipital nerve blocks and that made her symptoms worse.  She is not interested in trying Botox because she is concerned it would increase risk of subluxation in her neck.  In the past she tried gabapentin and Lyrica which caused substantial weight gain.  The patient has topiramate on her allergy list but does not remember taking it.       Allergies   Allergen Reactions     Bee Venom Anaphylaxis, Difficulty breathing, Palpitations, Shortness Of Breath and Visual Disturbance     Patient does carry Epi-Pen     No Clinical Screening - See Comments Anaphylaxis     Chicken-Derived Products (Egg) Diarrhea and Nausea     Patient will self monitor  Other reaction(s): Nausea  Patient will self monitor  Other reaction(s): GI intolerance     Gabapentin      Gluten Meal      Lactose Dermatitis and Diarrhea     Milk Digestant Nausea     Patient is aware of milk sensitivity, will manage own diet     Milk Protein Extract      Other reaction(s): GI intolerance\  EGGS      Topiramate      Wheat Diarrhea     Wheat Bran Nausea     Patient will self monitor  Other reaction(s): Nausea  Patient will self monitor     Not sure what reaction to topiramate was, doesn't remember taking it  She reports lots of weight gain from gabapentin and Lyrica.    Past Medical History:   Diagnosis Date     Allergic rhinitis 09/2007     Arthritis 11/01/2013    Found during search for cause of back pain     Autoimmune disease (H) 2016     Immunosuppresive     Bleeding disorder (H) 2016    Bruise easily     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     Chronic sinusitis 00/2007    Diagnosed with chronic pansinusitis 2016     Chronic tonsillitis 1980    Had tonsils removed 02/1981, rheumatic fever     Tom-Danlos disease      Gastroesophageal reflux disease 3/1/2017    I have large hiatal hernia     Hernia, abdominal 10/2016    Hiatal Hernia     Hiatal hernia 2016    Have adeline hiatel hernia     Hoarseness Unsure    It comes and goes     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     Migraines 1/1/2007    Unsure of exact date     Nasal polyps 2013    Were revoved 03/2017, benign     Other nervous system complications 1/2014    Autonomic nervous system disorder, neuralgia, neuropathy     Pneumonia Unsure    Have had pneumonia several times     Swelling, mass, or lump in head and neck 08/2016    Lymph node has been growing for last year     Thyroid disease 01/01/2008       Patient Active Problem List   Diagnosis     Pain syndrome, chronic     s/p TLIF L4-5,L5-S1 with solid arthrodesis     Tom-Danlos syndrome     Postlaminectomy syndrome, lumbar region     Headache     Cervicalgia     Slow transit constipation     Urinary urgency     Nocturia     Cardiac device in situ- [employer] Implanted Loop Recorder (ILR)     Acquired hypothyroidism     Allergic rhinitis     Anxiety     Anxiety disorder     Astigmatism     Chronic sinusitis with recurrent bronchitis     Arthritis of facet joints at multiple vertebral levels (H)     Arthritis involving multiple sites     Opioid dependence (H)     Cyst of fallopian tube     Depression     DNS (deviated nasal septum)     Dysuria     Easy bruising     Encounter for genetic counseling and testing     Essential hypertension with goal blood pressure less than 140/90     Head and neck lymphadenopathy     Hepatomegaly     Keratoconjunctivitis sicca (H)     Low back pain  radiating to both legs     Liver lesion     Low back pain     Low back pain of multiple sites of spine with sciatica     Lumbar degenerative disc disease     Lumbar radiculopathy     Lymphadenopathy of left cervical region     Migraine without aura     Nasal turbinate hypertrophy     Non compliance with medical treatment     Obese     Opioid type dependence, continuous (H)     Other acute postoperative pain     Pain disorder associated with psychological and physical factors     Presbyopia     Spondylolisthesis of lumbar region     Tachycardia     Uterine endometriosis       Past Surgical History:   Procedure Laterality Date     ADENOIDECTOMY  1981     AS REPAIR OF NASAL SEPTUM       BACK SURGERY  12/09/2013  10/01/2014    Spinal fusion L4-S1, L5 moving 5/8ths in. Remove screws/rods     BIOPSY  01/01/2008 & 3/2017    mole biopsy, lymph node biopsy     COLONOSCOPY  3/2017    Colonoscopy and GI     NASAL/SINUS POLYPECTOMY  2017    Polyps were benign     TONSILLECTOMY       TONSILLECTOMY  1981     TUBAL LIGATION         Social History     Social History     Marital status:      Spouse name: Carry     Number of children: 5     Years of education: N/A     Occupational History     Not on file.     Social History Main Topics     Smoking status: Former Smoker     Packs/day: 0.20     Years: 10.00     Types: Cigarettes     Start date: 1/1/1991     Quit date: 12/1/2013     Smokeless tobacco: Never Used      Comment: I somked on and off during specified dates.     Alcohol use Yes      Comment: Less than 5 drinks per year     Drug use: No     Sexual activity: Yes     Partners: Male     Birth control/ protection: Female Surgical     Other Topics Concern     Parent/Sibling W/ Cabg, Mi Or Angioplasty Before 65f 55m? No     Social History Narrative    5 kids: 24, 23, 19, 13, 7 yo     works at [employer] for disability           Family History   Problem Relation Age of Onset     Connective Tissue  Disorder Mother      eds     Connective Tissue Disorder Sister      eds     CANCER Father      Spinal tumor grew into spinal cord, went in brain     Migraines Father      DIABETES Maternal Grandmother      Elderly diabetes     HEART DISEASE Maternal Grandmother      Hypertension Maternal Grandmother      DIABETES Paternal Grandmother      Elderly diabetes     DIABETES Paternal Grandfather      Elderly diabetes     Asthma Sister      Pediatric Asthma     Migraines Sister      Asthma Son      Pediatric Asthma     Thyroid Disease Sister      ,     Migraines Sister      Migraines Sister      Breast Cancer No family hx of        Current Outpatient Prescriptions   Medication Sig     albuterol (PROAIR HFA/PROVENTIL HFA/VENTOLIN HFA) 108 (90 BASE) MCG/ACT Inhaler Inhale 2 puffs into the lungs     amitriptyline (ELAVIL) 10 MG tablet Take 3 tablets (30 mg) by mouth At Bedtime     buprenorphine (BUTRANS) 10 MCG/HR WK patch Place 1 patch onto the skin every 7 days     butalbital-acetaminophen-caffeine (FIORICET/ESGIC) -40 MG per tablet TAKE ONE TABLET BY MOUTH EVERY 4 HOURS AS NEEDED     cetirizine (ZYRTEC) 10 MG tablet Take 10 mg by mouth daily      COMPRESSION STOCKINGS 1 each daily 20-30 mmHg Thigh Length measure and fit, color and style per patient preference. Antolin garcia per need.     DULoxetine (CYMBALTA) 30 MG EC capsule Take 1 capsule (30 mg) by mouth daily     eletriptan (RELPAX) 20 MG tablet Take 1 tablet (20 mg) by mouth at onset of headache for migraine May repeat in 2 hours. Max 4 tablets/24 hours.     EPINEPHrine (EPIPEN/ADRENACLICK/OR ANY BX GENERIC EQUIV) 0.3 MG/0.3ML injection 2-pack Inject 0.3 mg into the muscle     famotidine (PEPCID) 20 MG tablet Take 1 tablet (20 mg) by mouth At Bedtime     fluticasone (FLONASE) 50 MCG/ACT spray Spray 2 sprays into both nostrils daily     fluticasone-salmeterol (ADVAIR DISKUS) 250-50 MCG/DOSE diskus inhaler Inhale 1 puff into the lungs 2 times daily as needed       "levothyroxine (SYNTHROID) 25 MCG tablet Take 25 mcg by mouth daily     linaclotide (LINZESS) 145 MCG capsule Take 1 capsule (145 mcg) by mouth every morning (before breakfast) Last refill until GI clinic follow up.     metoclopramide (REGLAN) 5 MG tablet Take 1 tablet (5 mg) by mouth every 6 hours as needed     midodrine (PROAMATINE) 5 MG tablet Take 1 tablet (5 mg) by mouth 2 times daily Take 5mg in the morning and 5 mg 4-6 hours later. Do not take within 5 hours of laying down.     Multiple Vitamins-Minerals (MULTIVITAMIN PO) Take by mouth daily     omeprazole (PRILOSEC) 20 MG CR capsule Take 1 capsule (20 mg) by mouth daily     ondansetron (ZOFRAN-ODT) 4 MG ODT tab Take 1-2 tablets (4-8 mg) by mouth every 12 hours as needed for nausea     ONE DAILY MULTIPLE VITAMIN OR      Probiotic Product (PROBIOTIC DAILY PO) Take by mouth 2 times daily 2 packets daily     rizatriptan (MAXALT-MLT) 5 MG ODT tab TAKE 1-2 TABLETS (5-10 MG) BY MOUTH AT ONSET OF HEADACHE FOR MIGRAINE (MAX 30 MG IN 24 HOURS)     SUMAtriptan (IMITREX STATDOSE) 6 MG/0.5ML pen injector kit Inject 0.5 mLs (6 mg) Subcutaneous at onset of headache for migraine (may repeat once after 2 hrs) May repeat in 1 hour. Max 12 mg/24 hours.     tiZANidine (ZANAFLEX) 4 MG tablet Take 1 tablet (4 mg) by mouth 3 times daily as needed for muscle spasms     traMADol (ULTRAM) 50 MG tablet Take 1 tablet (50 mg) by mouth every 6 hours as needed for pain maximum 4 tablet(s) per day (Patient not taking: Reported on 5/1/2018)     vitamin D (ERGOCALCIFEROL) 05878 UNIT capsule      zonisamide (ZONEGRAN) 25 MG capsule Take 125mg daily for 2 weeks, then 150mg     No current facility-administered medications for this visit.             ROS: Please see HPI all other systems review and negative.    Physical Examination:  /89  Pulse 97  Ht 1.715 m (5' 7.5\")  Wt 87.1 kg (192 lb)  BMI 29.63 kg/m2  General Appearance:  The patient is awake and alert.  She is oriented in no " acute distress  Neurological Examination  Cognition: oriented x3, attention and recall intact. No aphasia or dysarthria  Cranial Nerves: 2-12 intact. Funduscopic examination is normal with sharp disc margins bilaterally.   General Motor Survey: Normal muscle bulk, tone and strength in all four ext. No tremor  Coordination: Finger to nose and heel knee shin normal bilaterally. Normal alternating movements.  Reflexes: Upper and lower extremity reflexes are within normal limits (+2) and bilaterally symmetric.     Cardiovascular Examination:  Heart is regular in rate and rhythm to auscultation. No significant murmurs. No carotid bruits. No significant peripheral edema. Pedal pulses are palpable bilaterally.     Musculoskeletal Examination:  Neck is supple with full range of motion. No tenderness to palpitations.        Impression/Recommendations:  1.  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  She does not wish to change anything from her combination of amitriptyline, duloxetine and zonisamide.  I did discuss with her that in the future if she wanted to try Botox injections we could inject and avoid the cervical paraspinals and therefore decrease the risk of increased subluxation.  I also discussed with her that sometime this year though be some new migraine treatments that are monoclonal antibodies against CG RP.  At that time she might be a good candidate to try those.  I have asked her to contact me at the beginning of September to touch base and see if we can get her started on those treatments.    I met with the patient for 45 minutes over 50% counseling regarding migraine management.  At this time she does ask for a Reglan prescription to help with some nausea she is having.  The patient was advised to use Benadryl coupled with the Reglan to treat migraines as needed.    Noemy MCKEON  MD Wendy FAAN  Department of Neurology

## 2018-05-01 NOTE — MR AVS SNAPSHOT
After Visit Summary   5/1/2018    Ada Welch    MRN: [id number]           Patient Information     Date Of Birth          1970        Visit Information        Provider Department      5/1/2018 10:00 AM Noemy Nguyen MD [facility]        Today's Diagnoses     Intractable chronic migraine without aura and without status migrainosus    -  1    Intractable chronic migraine without aura and with status migrainosus          Care Instructions      Hold on buying Cefaly.  Check out eNeura- wait and see how it does on the market for now    Touch base with Dr Nguyen about the new migraine medicine in the fall. Monoclonal antibodies against CGRP. First should be approved in May.     Try Reglan and Benadryl together to treat migraine          Follow-ups after your visit        Follow-up notes from your care team     Return if symptoms worsen or fail to improve.      Your next 10 appointments already scheduled     May 03, 2018 10:30 AM CDT   New Visit with Ruth Esparza, PT   [facility] ([facility])    [address] [address] [address]   [phone]            May 03, 2018  6:00 PM CDT   (Arrive by 5:45 PM)   Return Visit with Aga Ang PA-C   [facility] ([facility])    [address] [place] [address] [phone]            May 18, 2018  5:00 PM CDT   (Arrive by 4:45 PM)   Return Visit with Seth Galeano MD   [facility] ([facility])    [address] [place] [id number] [phone]            Jun 07, 2018  9:30 AM CDT   Ech Complete with [address] [facility] ([facility])    [address] [place] [id number] [phone]           1. Please bring or wear a comfortable two-piece outfit. 2. You may eat, drink and take your  normal medicines. 3. For any questions that cannot be answered, please contact the ordering physician            Jun 07, 2018 10:30 AM CDT   (Arrive by 10:15 AM)   Implantable Loop Recorder with Uc Cv Device 1   [facility] ([facility])    [address] [place] [address]  [address] [place] [id number] [phone]            Jun 07, 2018 11:00 AM CDT   (Arrive by 10:45 AM)   RETURN ARRHYTHMIA with FIDELINA Mora CNP   [facility] ([facility])    [address] [place] [address]  [address] [place] [id number] [phone]            Jul 11, 2018 11:15 AM CDT   (Arrive by 11:00 AM)   Return Visit with Wade Singh MD   [facility] ([facility])    [address] [place] [address]  [facility] [address] [phone]            Jul 23, 2018 12:15 PM CDT   (Arrive by 12:00 PM)   New Allergy with Vahe Rodriguez MD   [facility] ([facility])    [address] [place] [id number] [phone]           Do not take anti-histamines or Zantac for seven day prior to your appointment.            Oct 25, 2018  9:25 AM CDT   (Arrive by 9:10 AM)   Return Visit with Ellyn Rivera MD   [facility] ([facility])    [address] [place] [address]  [facility] [id number] [phone]              Who to contact     Please call your clinic at [phone] to:    Ask questions about your health    Make or cancel appointments    Discuss your medicines    Learn about your test results    Speak to your doctor            Additional Information About Your Visit        MyChart Information     [employer] gives you secure access to your electronic health record. If you see a primary care provider, you can also send messages to your care team and make  "appointments. If you have questions, please call your primary care clinic.  If you do not have a primary care provider, please call [phone] and they will assist you.      [employer] is an electronic gateway that provides easy, online access to your medical records. With [employer], you can request a clinic appointment, read your test results, renew a prescription or communicate with your care team.     To access your existing account, please contact your [facility] or call [phone] for assistance.        Care EveryWhere ID     This is your Care EveryWhere ID. This could be used by other organizations to access your [place] medical records  [id number]        Your Vitals Were     Pulse Height BMI (Body Mass Index)             97 1.715 m (5' 7.5\") 29.63 kg/m2          Blood Pressure from Last 3 Encounters:   05/01/18 151/89   04/26/18 122/83   04/24/18 110/86    Weight from Last 3 Encounters:   05/01/18 87.1 kg (192 lb)   04/26/18 88 kg (193 lb 14.4 oz)   04/24/18 87.5 kg (193 lb)              Today, you had the following     No orders found for display         Today's Medication Changes          These changes are accurate as of 5/1/18 10:56 AM.  If you have any questions, ask your nurse or doctor.               These medicines have changed or have updated prescriptions.        Dose/Directions    metoclopramide 5 MG tablet   Commonly known as:  REGLAN   This may have changed:    - how much to take  - how to take this  - when to take this  - reasons to take this  - additional instructions   Used for:  Intractable chronic migraine without aura and with status migrainosus   Changed by:  Noemy Nguyen MD        Take 1-2 tabs po Q 6 hrs as needed for nausea   Quantity:  60 tablet   Refills:  3            Where to get your medicines      These medications were sent to [facility] [address] [place] [facility] [place] - [address]  [address] [address]     Phone:  " [phone]     metoclopramide 5 MG tablet                Primary Care Provider Office Phone # Fax #    NahantDenise Rivera [phone] [address] [address]        Equal Access to Services     KEYONALETA BROOKS : Hadii aad ku hadkokoo Soomaali, waaxda luqadaha, qaybta kaalmada adeegyada, waxay idiin hayjúniorn adealan chino laelhamjuanjose clyde. So [facility] [phone].    ATENCIÓN: Si habla español, tiene a sweeney disposición servicios gratuitos de asistencia lingüística. Llame al [phone].    We comply with applicable federal civil rights laws and [place] laws. We do not discriminate on the basis of race, color, national origin, age, disability, sex, sexual orientation, or gender identity.            Thank you!     Thank you for choosing [facility]  for your care. Our goal is always to provide you with excellent care. Hearing back from our patients is one way we can continue to improve our services. Please take a few minutes to complete the written survey that you may receive in the mail after your visit with us. Thank you!             Your Updated Medication List - Protect others around you: Learn how to safely use, store and throw away your medicines at www.disposemymeds.org.          This list is accurate as of 5/1/18 10:56 AM.  Always use your most recent med list.                   Brand Name Dispense Instructions for use Diagnosis    ADVAIR DISKUS 250-50 MCG/DOSE diskus inhaler   Generic drug:  fluticasone-salmeterol      Inhale 1 puff into the lungs 2 times daily as needed        albuterol 108 (90 Base) MCG/ACT Inhaler    PROAIR HFA/PROVENTIL HFA/VENTOLIN HFA     Inhale 2 puffs into the lungs        amitriptyline 10 MG tablet    ELAVIL    270 tablet    Take 3 tablets (30 mg) by mouth At Bedtime    Chronic migraine without aura without status migrainosus, not intractable       buprenorphine 10 MCG/HR WK patch    BUTRANS    4 patch    Place 1 patch onto the skin every 7 days     Postlaminectomy syndrome of lumbar region       butalbital-acetaminophen-caffeine -40 MG per tablet    FIORICET/ESGIC     TAKE ONE TABLET BY MOUTH EVERY 4 HOURS AS NEEDED        cetirizine 10 MG tablet    zyrTEC     Take 10 mg by mouth daily        COMPRESSION STOCKINGS     3 each    1 each daily 20-30 mmHg Thigh Length measure and fit, color and style per patient preference. Doshar n jose per need.    Orthostatic hypotension       DULoxetine 30 MG EC capsule    CYMBALTA    60 capsule    Take 1 capsule (30 mg) by mouth daily    Lumbar post-laminectomy syndrome       eletriptan 20 MG tablet    RELPAX    18 tablet    Take 1 tablet (20 mg) by mouth at onset of headache for migraine May repeat in 2 hours. Max 4 tablets/24 hours.    Chronic daily headache, Intractable migraine without aura and with status migrainosus       EPINEPHrine 0.3 MG/0.3ML injection 2-pack    EPIPEN/ADRENACLICK/or ANY BX GENERIC EQUIV     Inject 0.3 mg into the muscle        famotidine 20 MG tablet    PEPCID    90 tablet    Take 1 tablet (20 mg) by mouth At Bedtime    Hiatal hernia, Mast cell disorder       fluticasone 50 MCG/ACT spray    FLONASE    1 Bottle    Spray 2 sprays into both nostrils daily    Chronic rhinitis, unspecified type       linaclotide 145 MCG capsule    LINZESS    30 capsule    Take 1 capsule (145 mcg) by mouth every morning (before breakfast) Last refill until GI clinic follow up.    Chronic idiopathic constipation       metoclopramide 5 MG tablet    REGLAN    60 tablet    Take 1-2 tabs po Q 6 hrs as needed for nausea    Intractable chronic migraine without aura and with status migrainosus       midodrine 5 MG tablet    PROAMATINE    180 tablet    Take 1 tablet (5 mg) by mouth 2 times daily Take 5mg in the morning and 5 mg 4-6 hours later. Do not take within 5 hours of laying down.    Orthostatic hypotension       MULTIVITAMIN PO      Take by mouth daily        omeprazole 20 MG CR capsule    priLOSEC    90 capsule     Take 1 capsule (20 mg) by mouth daily    Hiatal hernia       ondansetron 4 MG ODT tab    ZOFRAN-ODT    60 tablet    Take 1-2 tablets (4-8 mg) by mouth every 12 hours as needed for nausea    Migraine without status migrainosus, not intractable, unspecified migraine type       ONE DAILY MULTIPLE VITAMIN OR           PROBIOTIC DAILY PO      Take by mouth 2 times daily 2 packets daily        rizatriptan 5 MG ODT tab    MAXALT-MLT     TAKE 1-2 TABLETS (5-10 MG) BY MOUTH AT ONSET OF HEADACHE FOR MIGRAINE (MAX 30 MG IN 24 HOURS)        SUMAtriptan 6 MG/0.5ML pen injector kit    IMITREX STATDOSE    2 kit    Inject 0.5 mLs (6 mg) Subcutaneous at onset of headache for migraine (may repeat once after 2 hrs) May repeat in 1 hour. Max 12 mg/24 hours.    Chronic migraine without aura without status migrainosus, not intractable       SYNTHROID 25 MCG tablet   Generic drug:  levothyroxine      Take 25 mcg by mouth daily        tiZANidine 4 MG tablet    ZANAFLEX    90 tablet    Take 1 tablet (4 mg) by mouth 3 times daily as needed for muscle spasms    Post laminectomy syndrome       traMADol 50 MG tablet    ULTRAM    15 tablet    Take 1 tablet (50 mg) by mouth every 6 hours as needed for pain maximum 4 tablet(s) per day    Lumbar post-laminectomy syndrome       vitamin D 68308 UNIT capsule    ERGOCALCIFEROL          zonisamide 25 MG capsule    Zonegran    360 capsule    Take 125mg daily for 2 weeks, then 150mg    Occipital neuritis

## 2018-05-03 ENCOUNTER — OFFICE VISIT (OUTPATIENT)
Dept: NEUROSURGERY | Facility: CLINIC | Age: 48
End: 2018-05-03
Payer: MEDICARE

## 2018-05-03 ENCOUNTER — OFFICE VISIT (OUTPATIENT)
Dept: PALLIATIVE MEDICINE | Facility: CLINIC | Age: 48
End: 2018-05-03
Attending: FAMILY MEDICINE
Payer: COMMERCIAL

## 2018-05-03 VITALS
TEMPERATURE: 97.8 F | RESPIRATION RATE: 18 BRPM | WEIGHT: 194.3 LBS | DIASTOLIC BLOOD PRESSURE: 82 MMHG | BODY MASS INDEX: 29.45 KG/M2 | SYSTOLIC BLOOD PRESSURE: 126 MMHG | HEIGHT: 68 IN | OXYGEN SATURATION: 95 % | HEART RATE: 123 BPM

## 2018-05-03 DIAGNOSIS — G89.4 PAIN SYNDROME, CHRONIC: Primary | ICD-10-CM

## 2018-05-03 DIAGNOSIS — M47.816 SPONDYLOSIS OF LUMBAR REGION WITHOUT MYELOPATHY OR RADICULOPATHY: Primary | ICD-10-CM

## 2018-05-03 DIAGNOSIS — Q79.60 EHLERS-DANLOS SYNDROME: ICD-10-CM

## 2018-05-03 PROCEDURE — 97530 THERAPEUTIC ACTIVITIES: CPT | Mod: GP | Performed by: PHYSICAL THERAPIST

## 2018-05-03 PROCEDURE — 97162 PT EVAL MOD COMPLEX 30 MIN: CPT | Mod: GP | Performed by: PHYSICAL THERAPIST

## 2018-05-03 ASSESSMENT — PAIN SCALES - GENERAL: PAINLEVEL: MODERATE PAIN (5)

## 2018-05-03 NOTE — LETTER
5/3/2018       RE: Ada Welch  [address] [place] [address]     Dear Colleague,    Thank you for referring your patient, Ada Welch, to the [facility] NEUROSURGERY at [facility]. Please see a copy of my visit note below.      Neurosurgery Clinic   Date of Visit: 4/26/2018    Dear Dr. Galeano    We were happy to see Ada Welch, a pleasant 47 year old year old female for back pain.    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 She suffers chronic headaches, neck pain and back pain. She was seen in neurosurgery clinic in November by Dr. Moncada to establish with neurosurgery, in that case for her cervical spine. She had undergone an EMG which was not diagnostic for radiculopathy but was notable for abnormal spontaneous activity restricted to the left cervical paraspinal muscles.On physical exam she was noted to have decreased finger abduction and  in the bilateral upper extremities. She had bilaterally positive Moe's.. Otherwise her exam was normal.  Cervical MRI was reasonably normal. No obvious stenoses at any level.  There were flexion-extension views with the MRI and there was hypermobility noted although there was extensive motion artifact. There were extensive degenerative changes most pronounced at C4/5, C5/6, and C6/7.. There was abutment of the cord but there was residual space within the canal. In general it was felt she had generalized cervicalgia, bilateral C8 radiculopathy without radiographic evidence and hypermobility due to her Tom Danlos. The plan was for flexion-extension views of the cervical and lumbar spine, to wear her collar p.r.n. But to wean out of it to avoid a resultant atrophy which was possibly contributing to her generalized cervicalgia, and refer her back to the pain clinic where occipital nerve blocks might be considered. Recommending against Botox injections.. Surgery was  not offered at that time. P.r.n. Follow-up    Since then you have seen her again, she spoke with you about the surgeries on her lumbar spine she had had in [place] and the more recent one here at the hands of Dr. Lucero. She has had ongoing numbness in the legs, specifically the left leg and a recent MRI showing some mild impingement of the exiting left L5 nerve root. She would like to have an opinion about that and so she has had another set of EMGs,this time on her lower extremities and done by . She presents for an opinion today.    EMGs bilateral lower extremities performed 4/5/18  Interpretation the EMG is normal. There is no EMG evidence for left or right lumbar/sacral radiculopathy. There is no evidence for generalized myotonic disorder.     She describes her pain as present in the low lumbar spine radiating to the bilateral upper buttocks and the bilateral lateral thighs to just above the level of the knees.it is constant achy and sharp stiff sore to the touch and pressure-like. It is a little better on medication since October. But any motion aggravates it. At worst it is 10/10 screaming out loud pain. At best it drops to 4/10 and that is when she is lying still on her right side.    She has numbness present constantly in the left lateral anterior shin, described as tingling. She also has numbness from the waist down occasionally when she sits on the toilet.  It is not clear what particular instances bring that on. When this numbness appears she also experiences weakness of the bilateral legs. Again, it is randomly appearing, not related to any particular activity, not related to for example straining at stool or any other incident. This is also associated with difficulty with coordination and balance during most times.However she denies change in bowel or bladder control.    At last visit she had a essentially intact neurologic exam, a fairly clean MRI, a fusion that looked solid on MRI, but with  multiple areas of pain, numbness greater on the left than the right, and back pain radiating to both bilateral lateral thighs I could not identify the source.  She had had prior dynamic views of the lumbar spine, these were unremarkable. I requested full length spine films to look at overall balance; these have been done and reveal no coronal or sagittal imbalance.  Given the prior fusion I'm requested a CT scan to assess the status of the fusion.  The CT scan shows a nicely maturing fusion without evidence of pseudoarthrosis. She checks in today to discuss these results.    Patient Supplied Answers To the [facility] Pain Questionnaire  [facility] Pain -  Patient Entered Questionnaire/Answers 5/3/2018   What number best describes your pain right now:  0 = No pain  to  10 = Worst pain imaginable 6   How would you describe the pain? burning, sharp, numbness, cutting, dull, aching, pressure   Which of the following worsen your pain? standing, sitting, walking, exercise, coughing / sneezing, urination, bowel movements   Which of the following improve or reduce your pain?  lying down, medication, relaxation   What number best describes your average pain for the past week:  0 = No pain  to  10 = Worst pain imaginable 6   What number best describes your LOWEST pain in past 24 hours:  0 = No pain  to  10 = Worst pain imaginable 4   What number best describes your WORST pain in past 24 hours:  0 = No pain  to  10 = Worst pain imaginable 8   When is your pain worst? Constant   What non-medicine treatments have you already had for your pain? pain clinic, physical therapy, relaxation training, acupuncture, chiropractic care, TENS (electrical stimulator), spine injections (shots), other nerve blocks, counseling, surgery, exercise, other   Have you tried treating your pain with medication?  Yes   Are you currently taking medications for your pain? Yes       Current Outpatient Prescriptions:      albuterol (PROAIR HFA/PROVENTIL HFA/VENTOLIN HFA)  108 (90 BASE) MCG/ACT Inhaler, Inhale 2 puffs into the lungs, Disp: , Rfl:      amitriptyline (ELAVIL) 10 MG tablet, Take 3 tablets (30 mg) by mouth At Bedtime, Disp: 270 tablet, Rfl: 3     buprenorphine (BUTRANS) 10 MCG/HR WK patch, Place 1 patch onto the skin every 7 days, Disp: 4 patch, Rfl: 1     butalbital-acetaminophen-caffeine (FIORICET/ESGIC) -40 MG per tablet, TAKE ONE TABLET BY MOUTH EVERY 4 HOURS AS NEEDED, Disp: , Rfl: 0     cetirizine (ZYRTEC) 10 MG tablet, Take 10 mg by mouth daily , Disp: , Rfl:      COMPRESSION STOCKINGS, 1 each daily 20-30 mmHg Thigh Length measure and fit, color and style per patient preference. Doff n jose per need., Disp: 3 each, Rfl: 3     DULoxetine (CYMBALTA) 30 MG EC capsule, Take 1 capsule (30 mg) by mouth daily, Disp: 60 capsule, Rfl: 1     eletriptan (RELPAX) 20 MG tablet, Take 1 tablet (20 mg) by mouth at onset of headache for migraine May repeat in 2 hours. Max 4 tablets/24 hours., Disp: 18 tablet, Rfl: 1     EPINEPHrine (EPIPEN/ADRENACLICK/OR ANY BX GENERIC EQUIV) 0.3 MG/0.3ML injection 2-pack, Inject 0.3 mg into the muscle, Disp: , Rfl:      famotidine (PEPCID) 20 MG tablet, Take 1 tablet (20 mg) by mouth At Bedtime, Disp: 90 tablet, Rfl: 1     fluticasone (FLONASE) 50 MCG/ACT spray, Spray 2 sprays into both nostrils daily, Disp: 1 Bottle, Rfl: 3     fluticasone-salmeterol (ADVAIR DISKUS) 250-50 MCG/DOSE diskus inhaler, Inhale 1 puff into the lungs 2 times daily as needed , Disp: , Rfl:      levothyroxine (SYNTHROID) 25 MCG tablet, Take 25 mcg by mouth daily, Disp: , Rfl:      linaclotide (LINZESS) 145 MCG capsule, Take 1 capsule (145 mcg) by mouth every morning (before breakfast) Last refill until GI clinic follow up., Disp: 30 capsule, Rfl: 1     metoclopramide (REGLAN) 5 MG tablet, Take 1-2 tabs po Q 6 hrs as needed for nausea, Disp: 60 tablet, Rfl: 3     midodrine (PROAMATINE) 5 MG tablet, Take 1 tablet (5 mg) by mouth 2 times daily Take 5mg in the morning  and 5 mg 4-6 hours later. Do not take within 5 hours of laying down., Disp: 180 tablet, Rfl: 3     Multiple Vitamins-Minerals (MULTIVITAMIN PO), Take by mouth daily, Disp: , Rfl:      omeprazole (PRILOSEC) 20 MG CR capsule, Take 1 capsule (20 mg) by mouth daily, Disp: 90 capsule, Rfl: 2     ondansetron (ZOFRAN-ODT) 4 MG ODT tab, Take 1-2 tablets (4-8 mg) by mouth every 12 hours as needed for nausea, Disp: 60 tablet, Rfl: 1     ONE DAILY MULTIPLE VITAMIN OR, , Disp: , Rfl:      Probiotic Product (PROBIOTIC DAILY PO), Take by mouth 2 times daily 2 packets daily, Disp: , Rfl:      rizatriptan (MAXALT-MLT) 5 MG ODT tab, TAKE 1-2 TABLETS (5-10 MG) BY MOUTH AT ONSET OF HEADACHE FOR MIGRAINE (MAX 30 MG IN 24 HOURS), Disp: , Rfl: 6     SUMAtriptan (IMITREX STATDOSE) 6 MG/0.5ML pen injector kit, Inject 0.5 mLs (6 mg) Subcutaneous at onset of headache for migraine (may repeat once after 2 hrs) May repeat in 1 hour. Max 12 mg/24 hours., Disp: 2 kit, Rfl: 1     tiZANidine (ZANAFLEX) 4 MG tablet, Take 1 tablet (4 mg) by mouth 3 times daily as needed for muscle spasms, Disp: 90 tablet, Rfl: 1     traMADol (ULTRAM) 50 MG tablet, Take 1 tablet (50 mg) by mouth every 6 hours as needed for pain maximum 4 tablet(s) per day, Disp: 15 tablet, Rfl: 0     vitamin D (ERGOCALCIFEROL) 59520 UNIT capsule, , Disp: , Rfl:      zonisamide (ZONEGRAN) 25 MG capsule, Take 125mg daily for 2 weeks, then 150mg, Disp: 360 capsule, Rfl: 3    Allergies   Allergen Reactions     Bee Venom Anaphylaxis, Difficulty breathing, Palpitations, Shortness Of Breath and Visual Disturbance     Patient does carry Epi-Pen     No Clinical Screening - See Comments Anaphylaxis     Chicken-Derived Products (Egg) Diarrhea and Nausea     Patient will self monitor  Other reaction(s): Nausea  Patient will self monitor  Other reaction(s): GI intolerance     Gabapentin      Gluten Meal      Lactose Dermatitis and Diarrhea     Milk Digestant Nausea     Patient is aware of milk  "sensitivity, will manage own diet     Milk Protein Extract      Other reaction(s): GI intolerance\  EGGS      Topiramate      Wheat Diarrhea     Wheat Bran Nausea     Patient will self monitor  Other reaction(s): Nausea  Patient will self monitor       Problem list, Past medical history, family, social:  reviewed in Epic.     OBJECTIVE:   /82 (BP Location: Right arm, Patient Position: Sitting, Cuff Size: Adult Regular)  Pulse 123  Temp 97.8  F (36.6  C) (Oral)  Resp 18  Ht 1.715 m (5' 7.5\")  Wt 88.1 kg (194 lb 4.8 oz)  SpO2 95%  BMI 29.98 kg/m2    Imaging:  These are the pertinent radiologist's findings from:  3/7/18 lumbar flexion-extension views  Postsurgical changes of interbody fusion L4-L5 and L5-S1. Mild disc  height loss at L3-4. No evidence of abnormal lumbar motion in  flexion/extension. Lower lumbar facet arthropathy.   MRI lumbar spine 4/20/18  With/without  L1-2: Small posterior disc bulge. No spinal canal or neural foraminal  stenosis..  L2-3: Small posterior disc bulge. No spinal canal or neural foraminal  stenosis..  L3-4: Small left eccentric posterior disc bulge partially narrows the  left lateral recess. Bilateral facet and ligamentum flavum thickening.  Mild spinal canal narrowing. Mild left neural foraminal narrowing. No  right neural foraminal stenosis.  L4-5: Postsurgical changes of interbody spinal fusion and left  hemilaminectomy. Left asymmetric posterior osteophyte mildly narrows  the left lateral recess. No spinal canal or neural foraminal stenosis.  L5-S1: Postsurgical changes of interbody fusion and left  hemilaminectomy. Facet hypertrophy bilaterally. Left asymmetric  posterior osteophyte mildly narrows the left lateral recess. No spinal  canal or neural foraminal stenosis.  See the full report in EPIC/Media tab.  I personally reviewed the images with the patient.      Exam:  *   Well developed, well nourished female found seated comfortably in exam room chair.  Is able to " sit and rise independently.    Accompanied by male spouse and 2 children  *  Mental Status  A&O X3.  Bright, alert, affable, interactive. Language fluid, fund of knowledge intact. Good historian.  Mood and affect congruent and WNL.      ASSESSMENT/PLAN:  1. Spondylosis of lumbar region without myelopathy or radiculopathy      She has multiple areas of pain, numbness greater on the left than the right, and back pain radiating to both  Bilateral lateral thighs. Her MRI is fairly clean.  She has unremarkable dynamic imaging of the lumbar spine, no global coronal or sagittal imbalance, although a reverse cervical lordosis. She has a solid fusion on CT scan. Overall I do not see a source of her lumbar symptoms, and even if I could identify a pain source with Tom Zuniga I am extremely hesitant to recommend surgical intervention. It is very much a 2-edged sword. So we discussed all this frankly and I believe she is in general agreement with the philosophical take on this. I believe that her best options are nonoperative therapy, at-home management, and good health practices, weight control, gentle strengthening and stretching, healthy eating, sensible living. She is in general agreement. And so I have not made a specific return appointment but will be happy to see her again should the need arise.    I answered all of her questions, which indicated good understanding of the situation.  She is willing to move forward with the recommendations. I supplied her with business cards and telephone numbers for ease of contact.   It's been a pleasure participating in the care of this nice patient. We thank you for your confidence in the [facility], Department of Neurosurgery.      Total time: 60 minutes with more than 30 minutes spent in direct face to face contact reviewing films, providing education, counseling, non-operative therapies,and indications for surgery as well as further follow up.    This note was  generated using voice recognition software. While edited for content some inaccurate phrasing may be found.      Again, thank you for allowing me to participate in the care of your patient.      Sincerely,    Aga Ang PA-C

## 2018-05-03 NOTE — NURSING NOTE
Chief Complaint   Patient presents with     RECHECK     [facility] RETURN - F/U     Neelima Covarrubias MA

## 2018-05-03 NOTE — PROGRESS NOTES
"PHYSICAL THERAPY INITIAL EVALUATION and PLAN OF CARE    Patient Name: Ada Welch     : 1970    MRN: [id number]   Pain Management Provider:  Dr. Adolfo Mahmood    Diagnosis:    Pain syndrome, chronic  Tom-Danlos syndrome    SUBJECTIVE:    PRESENTATION AND ETIOLOGY    Chief Complaint: Neck, lower back, bilateral shoulder and hip pain related to EDS; referred by Dr. Mahmood to learn pain management strategies; moved from [facility] to [place] last summer; has attended pain psychology in [facility] prior to moving to [place] and found this very beneficial; currently working towards \"putting together a team of specialists\" to help manage EDS    Onset / Etiology: lumbar fusion in 2013, diagnosed with EDS 2014    Pattern Since Onset: Worsened    Pain is described as ache, nauseated, sharp, burning, pressure      Frequency: Constant     Intensity: Best 4/10, Worst 10/10;  Current 5-6/10 ; Average 6-7/10    Fatigue Level: (scale 0-10)  8/10 average    LEVEL OF FUNCTION AT START OF CARE    Walking tolerance: 5-10 minutes  Sitting tolerance: 5-10 minutes  Standing tolerance: 5-10 minutes  Housework tolerance: 30 minutes \"pushes through\"  Sleep: wakes 2-3 x/night since starting medical cannabis    Current Aggrevating Activities / Functional Limitations: all daily activities      CURRENT / PREVIOUS INTERVENTION(S):     Relieving Activities / Self Care: rest, lying on right side, biofeedback/breathing, heat, TENS, elevate knees, bath,     Previous / Current therapies for current chief complaint: PT: [facility] MFR, CST, SCS      DEMOGRAPHICS  Employment Status: not working, applying for disability    Social Support: lives with  and 2 children in [place]    Home or Community Barriers: Stairs: difficulty with stairs, shaky legs going down stairs    Pertinent Medical  / Surgical History: Epic Snapshot Reviewed, See provider's note; migraines, EDS, dysautonomia     Patient's goals for physical therapy: learn " how to move safely; learn pain management strategies to reverse pain flare    ===============================================================  OBJECTIVE:  POSTURE:  Observation: Patient demonstrates forward head, protracted shoulders and thoracic kyphosis in standing and sitting posture.  Noted bilateral knee hyperextension in standing.       GAIT, LOCOMOTION, and BALANCE:  Gait and Locomotion: slow, possible antalgic gait on the left, difficulty with dysautonomia walking from lobby to treatment area  Balance: unsteady at times, next    MUSCLE PERFORMANCE:   Strength: demonstrates core and scapular instability      Flexibility: lower lumbar paraspinals, next    FUNCTIONAL TESTING/OBSERVATION: independent chair and bed mobility; moving slowly with transitional movements    Pain behaviors: None    ===============================================================  Today's Treatment:  Initial evaluation  Therapeutic Activity:   For 30 minutes including Pt educated on the concept of the nervous system as a hypersensitive and hyperactive alarm system and the role of physical therapy in desensitizing the nerves and reducing pain. Patient was educated about nerve sensitivity caused by central sensitization and driven by both biological and psychosocial factors.  The patient was encouraged to identify personal stressors which contribute to pain and to discuss these with PT for guidance and plan to move ahead.  Issued pain flare booklet.  Instructed in supine 90/90 postural realignment and beginning body scan.  Focus next session:  Self cares, body scan, consider side lying shoulder/hip glides, frog leg hip mobility, consider cardio    ===============================================================  ASSESSMENT:  Physical Therapy Diagnosis:Impaired Posture and Impaired Muscle Performance    Patient requires PT intervention for the following impairments: Limited knowledge of condition and / or self care - inability to control  symptoms, Impaired functional mobility, Impaired gait / weight bearing tolerance, Impaired posture / muscle imbalance, Joint hypermobility, Muscle strength and endurance limitations, Pain and Deconditioning    Anticipated Goals and Expected Outcomes:  8 weeks  Patient will report the use of 2 self care practices during their day.  Patient will report the participation in 20 minutes of aerobic activity daily and practicing stretching daily.  Patient will demonstrate the ability to find core strength in neutral posture.  Patient will demonstrate the ability to relax muscle group before stretching.  16 weeks  Patient will be independent with a home exercise program.  Patient will be independent with posture correction.  Patient will report independence with a self care/flare management program.  Patient will demonstrate improved functional strength and endurance as reports by increased tolerance for IADLs and more consistent participation in daily activity.     Rehab potential for achieving goals: excellent.    ===============================================================  PLAN:   Patient will benefit from skilled physical therapy consisting of:  neuromuscular reeducation of: kinesthetic sense and posture for sitting and/or standing activities, education in self care / home management training to include instruction in: symptom control techniques, therapeutic activities to achieve improved functional performance in: daily actvities and therapeutic exercise to develop: strength and endurance, flexibility and core stability    Assessment will be ongoing with changes in treatment as indicated.  Benefits/risks/alternatives to treatment have been reviewed and the patient has been instructed to contact this office if they have any questions or concerns.  This plan of care has been discussed with the patient and the patient is in agreement.     Frequency / Duration:  Patient will be seen for a total of 13 visits; 16  weeks    Total Visit Time: 50  minutes            Ruth Esparza, PT                                      Date:  5/3/2018      =====================================================  **  Referring Provider Certification: Referring Provider reviewing certifies that the above treatment / plan of care is required and authorized, and that the patient's plan will be reviewed every thirty (30) days **.   ======================================================     PRESENT:  NA    MULTIDISCIPLINARY PATIENT / FAMILY EDUCATION RECORD  Department:  Physical Therapy    Readiness to Learn: Ability to understand verbal instructions, Ability to understand written instructions, Knowledge of educational needs / treatment plan  Specific Barriers to Learning: None  Referrals: None  Learning Needs: Rehabilitation techniques to improve functional independence Pain management education to improve daily activity tolerance.  Who: Patient  How: Demonstration, Verbal instructions, Written instructions  Response: Appropriate verbal response, Asked questions, Demonstrated ability, Verbalized recall / understanding

## 2018-05-03 NOTE — MR AVS SNAPSHOT
After Visit Summary   5/3/2018    Ada Welch    MRN: [id number]           Patient Information     Date Of Birth          1970        Visit Information        Provider Department      5/3/2018 11:15 AM Ruth Esparza, JESS[facility]        Today's Diagnoses     Pain syndrome, chronic    -  1    Tom-Danlos syndrome           Follow-ups after your visit        Your next 10 appointments already scheduled     May 03, 2018  6:00 PM CDT   (Arrive by 5:45 PM)   Return Visit with Aga Ang PA-C   [facility] ([facility])    [address] [place] [id number] [phone]            May 15, 2018 10:30 AM CDT   Return Visit with Ruth Esparza PT   [facility] ([facility])    [address]  [facility] [address]   [phone]            May 18, 2018  5:00 PM CDT   (Arrive by 4:45 PM)   Return Visit with Seth Galeano MD   [facility] ([facility])    [address] [place] [id number] [phone]            Jun 07, 2018  9:30 AM CDT   Ech Complete with UCECHCR1   [facility] ([facility])    [address] [place] [id number] [phone]           1. Please bring or wear a comfortable two-piece outfit. 2. You may eat, drink and take your normal medicines. 3. For any questions that cannot be answered, please contact the ordering physician            Jun 07, 2018 10:30 AM CDT   (Arrive by 10:15 AM)   Implantable Loop Recorder with Uc Cv Device 1   [facility] ([facility])    [address] [place] [address] [phone]            Jun 07, 2018 11:00 AM CDT   (Arrive by 10:45 AM)   RETURN ARRHYTHMIA with FIDELINA Mora [facility] ([facility])    [address] [facility]  [address]  [facility] [address]   [phone]            Jul 11, 2018 11:15 AM CDT   (Arrive by 11:00 AM)   Return Visit with Wade Singh MD   [facility] ([facility])    [address] [place] [address]  [address]th [facility] [address] [phone]            Jul 23, 2018 12:15 PM CDT   (Arrive by 12:00 PM)   New Allergy with Vahe Rodriguez MD   [facility] ([facility])    [address] [place] [address]  [address] [place] [id number] [phone]           Do not take anti-histamines or Zantac for seven day prior to your appointment.            Oct 25, 2018  9:25 AM CDT   (Arrive by 9:10 AM)   Return Visit with Ellyn Rivera MD   [facility] ([facility])    [address] [place] [id number] [phone]              Who to contact     If you have questions or need follow up information about today's clinic visit or your schedule please contact [facility] directly at [phone].  Normal or non-critical lab and imaging results will be communicated to you by MyChart, letter or phone within 4 business days after the clinic has received the results. If you do not hear from us within 7 days, please contact the clinic through MyChart or phone. If you have a critical or abnormal lab result, we will notify you by phone as soon as possible.  Submit refill requests through [employer] or call your pharmacy and they will forward the refill request to us. Please allow 3 business days for your refill to be completed.          Additional Information About Your Visit        [employer] Information     [employer] gives you secure access to your electronic health record. If you see a primary care provider, you can also send messages to your care team and make  appointments. If you have questions, please call your primary care clinic.  If you do not have a primary care provider, please call [phone] and they will assist you.        Care EveryWhere ID     This is your Care EveryWhere ID. This could be used by other organizations to access your [place] medical records  [id number]         Blood Pressure from Last 3 Encounters:   05/01/18 151/89   04/26/18 122/83   04/24/18 110/86    Weight from Last 3 Encounters:   05/01/18 87.1 kg (192 lb)   04/26/18 88 kg (193 lb 14.4 oz)   04/24/18 87.5 kg (193 lb)              We Performed the Following     HC PT EVAL, MODERATE COMPLEXITY     THERAPEUTIC ACTIVITIES        Primary Care Provider Office Phone # Fax #    Ellyn Rivera [phone] [address] [address]        Equal Access to Services     [facility] : Hadii aad ku hadasho Sonoyali, waaxda luqadaha, qaybta kaalmada adeegyada, waxay jerryin hayjúniorn pawan dykes . So [facility] [phone].    ATENCIÓN: Si habla español, tiene a sweeney disposición servicios gratuitos de asistencia lingüística. Isaías al [phone].    We comply with applicable federal civil rights laws and [place] laws. We do not discriminate on the basis of race, color, national origin, age, disability, sex, sexual orientation, or gender identity.            Thank you!     Thank you for choosing [facility]  for your care. Our goal is always to provide you with excellent care. Hearing back from our patients is one way we can continue to improve our services. Please take a few minutes to complete the written survey that you may receive in the mail after your visit with us. Thank you!             Your Updated Medication List - Protect others around you: Learn how to safely use, store and throw away your medicines at www.disposemymeds.org.          This list is accurate as of 5/3/18 12:47 PM.  Always use your most recent med list.                    Brand Name Dispense Instructions for use Diagnosis    ADVAIR DISKUS 250-50 MCG/DOSE diskus inhaler   Generic drug:  fluticasone-salmeterol      Inhale 1 puff into the lungs 2 times daily as needed        albuterol 108 (90 Base) MCG/ACT Inhaler    PROAIR HFA/PROVENTIL HFA/VENTOLIN HFA     Inhale 2 puffs into the lungs        amitriptyline 10 MG tablet    ELAVIL    270 tablet    Take 3 tablets (30 mg) by mouth At Bedtime    Chronic migraine without aura without status migrainosus, not intractable       buprenorphine 10 MCG/HR WK patch    BUTRANS    4 patch    Place 1 patch onto the skin every 7 days    Postlaminectomy syndrome of lumbar region       butalbital-acetaminophen-caffeine -40 MG per tablet    FIORICET/ESGIC     TAKE ONE TABLET BY MOUTH EVERY 4 HOURS AS NEEDED        cetirizine 10 MG tablet    zyrTEC     Take 10 mg by mouth daily        COMPRESSION STOCKINGS     3 each    1 each daily 20-30 mmHg Thigh Length measure and fit, color and style per patient preference. Doff n jose per need.    Orthostatic hypotension       DULoxetine 30 MG EC capsule    CYMBALTA    60 capsule    Take 1 capsule (30 mg) by mouth daily    Lumbar post-laminectomy syndrome       eletriptan 20 MG tablet    RELPAX    18 tablet    Take 1 tablet (20 mg) by mouth at onset of headache for migraine May repeat in 2 hours. Max 4 tablets/24 hours.    Chronic daily headache, Intractable migraine without aura and with status migrainosus       EPINEPHrine 0.3 MG/0.3ML injection 2-pack    EPIPEN/ADRENACLICK/or ANY BX GENERIC EQUIV     Inject 0.3 mg into the muscle        famotidine 20 MG tablet    PEPCID    90 tablet    Take 1 tablet (20 mg) by mouth At Bedtime    Hiatal hernia, Mast cell disorder       fluticasone 50 MCG/ACT spray    FLONASE    1 Bottle    Spray 2 sprays into both nostrils daily    Chronic rhinitis, unspecified type       linaclotide 145 MCG capsule    LINZESS    30 capsule    Take 1 capsule (145 mcg) by mouth  every morning (before breakfast) Last refill until GI clinic follow up.    Chronic idiopathic constipation       metoclopramide 5 MG tablet    REGLAN    60 tablet    Take 1-2 tabs po Q 6 hrs as needed for nausea        midodrine 5 MG tablet    PROAMATINE    180 tablet    Take 1 tablet (5 mg) by mouth 2 times daily Take 5mg in the morning and 5 mg 4-6 hours later. Do not take within 5 hours of laying down.    Orthostatic hypotension       MULTIVITAMIN PO      Take by mouth daily        omeprazole 20 MG CR capsule    priLOSEC    90 capsule    Take 1 capsule (20 mg) by mouth daily    Hiatal hernia       ondansetron 4 MG ODT tab    ZOFRAN-ODT    60 tablet    Take 1-2 tablets (4-8 mg) by mouth every 12 hours as needed for nausea    Migraine without status migrainosus, not intractable, unspecified migraine type       ONE DAILY MULTIPLE VITAMIN OR           PROBIOTIC DAILY PO      Take by mouth 2 times daily 2 packets daily        rizatriptan 5 MG ODT tab    MAXALT-MLT     TAKE 1-2 TABLETS (5-10 MG) BY MOUTH AT ONSET OF HEADACHE FOR MIGRAINE (MAX 30 MG IN 24 HOURS)        SUMAtriptan 6 MG/0.5ML pen injector kit    IMITREX STATDOSE    2 kit    Inject 0.5 mLs (6 mg) Subcutaneous at onset of headache for migraine (may repeat once after 2 hrs) May repeat in 1 hour. Max 12 mg/24 hours.    Chronic migraine without aura without status migrainosus, not intractable       SYNTHROID 25 MCG tablet   Generic drug:  levothyroxine      Take 25 mcg by mouth daily        tiZANidine 4 MG tablet    ZANAFLEX    90 tablet    Take 1 tablet (4 mg) by mouth 3 times daily as needed for muscle spasms    Post laminectomy syndrome       traMADol 50 MG tablet    ULTRAM    15 tablet    Take 1 tablet (50 mg) by mouth every 6 hours as needed for pain maximum 4 tablet(s) per day    Lumbar post-laminectomy syndrome       vitamin D 70459 UNIT capsule    ERGOCALCIFEROL          zonisamide 25 MG capsule    Zonegran    360 capsule    Take 125mg daily for 2  weeks, then 150mg    Occipital neuritis

## 2018-05-03 NOTE — MR AVS SNAPSHOT
After Visit Summary   5/3/2018    Ada Welch    MRN: [id number]           Patient Information     Date Of Birth          1970        Visit Information        Provider Department      5/3/2018 6:00 PM Aga Ang PA-C [facility]        Today's Diagnoses     Spondylosis of lumbar region without myelopathy or radiculopathy    -  1       Follow-ups after your visit        Your next 10 appointments already scheduled     May 15, 2018 10:30 AM CDT   Return Visit with Ruth Esparza PT   [facility] ([facility])    [address]  [facility] [address]   [phone]            May 18, 2018  5:00 PM CDT   (Arrive by 4:45 PM)   Return Visit with Seth Galeano MD   [facility] ([facility])    [address] [place] [id number] [phone]            Jun 07, 2018  9:30 AM CDT   Ech Complete with UCECHCR1   [facility] ([facility])    [address] [place] [id number] [phone]           1. Please bring or wear a comfortable two-piece outfit. 2. You may eat, drink and take your normal medicines. 3. For any questions that cannot be answered, please contact the ordering physician            Jun 07, 2018 10:30 AM CDT   (Arrive by 10:15 AM)   Implantable Loop Recorder with Uc Cv Device 1   [facility] ([facility])    [address] [place] [address]  [address] [place] [id number] [phone]            Jun 07, 2018 11:00 AM CDT   (Arrive by 10:45 AM)   RETURN ARRHYTHMIA with FIDELINA Mora [facility] ([facility])    [address] [place] [address]  [address] [place] [id number] [phone]            Jul 11, 2018 11:15 AM CDT   (Arrive by 11:00 AM)   Return Visit with Wade Singh MD   [facility] for Prostate  and Urologic Cancers ([facility])    [address]  4th Floor  [facility] [phone] [phone]            Jul 23, 2018 12:15 PM CDT   (Arrive by 12:00 PM)   New Allergy with Vahe Rodriguez MD   [facility] ([facility])    [address]  [address]  [facility] [address] [phone]           Do not take anti-histamines or Zantac for seven day prior to your appointment.            Oct 25, 2018  9:25 AM CDT   (Arrive by 9:10 AM)   Return Visit with Ellyn Rivera MD   [facility] ([facility])    [address]  4th [facility] [address] [phone]              Who to contact     Please call your clinic at [phone] to:    Ask questions about your health    Make or cancel appointments    Discuss your medicines    Learn about your test results    Speak to your doctor            Additional Information About Your Visit        [employer] Information     [employer] gives you secure access to your electronic health record. If you see a primary care provider, you can also send messages to your care team and make appointments. If you have questions, please call your primary care clinic.  If you do not have a primary care provider, please call [phone] and they will assist you.      [employer] is an electronic gateway that provides easy, online access to your medical records. With [employer], you can request a clinic appointment, read your test results, renew a prescription or communicate with your care team.     To access your existing account, please contact your [facility] or call [phone] for assistance.        Care EveryWhere ID     This is your Care EveryWhere ID. This could be used by other organizations to access your [place] medical records  [id number]        Your Vitals Were     Pulse Temperature  "Respirations Height Pulse Oximetry BMI (Body Mass Index)    123 97.8  F (36.6  C) (Oral) 18 1.715 m (5' 7.5\") 95% 29.98 kg/m2       Blood Pressure from Last 3 Encounters:   05/03/18 126/82   05/01/18 151/89   04/26/18 122/83    Weight from Last 3 Encounters:   05/03/18 88.1 kg (194 lb 4.8 oz)   05/01/18 87.1 kg (192 lb)   04/26/18 88 kg (193 lb 14.4 oz)              Today, you had the following     No orders found for display       Primary Care Provider Office Phone # Fax #    Ellyn Rivera [phone]       [address] [address]        Equal Access to Services     [facility] BROOKS : Ashutosh Slater, waamie oakes, qaybquincy kaalmadonnell arellano, odette dykes . So [facility] [phone].    ATENCIÓN: Si habla español, tiene a sweeney disposición servicios gratuitos de asistencia lingüística. [facility] [phone].    We comply with applicable federal civil rights laws and [place] laws. We do not discriminate on the basis of race, color, national origin, age, disability, sex, sexual orientation, or gender identity.            Thank you!     Thank you for choosing [facility]  for your care. Our goal is always to provide you with excellent care. Hearing back from our patients is one way we can continue to improve our services. Please take a few minutes to complete the written survey that you may receive in the mail after your visit with us. Thank you!             Your Updated Medication List - Protect others around you: Learn how to safely use, store and throw away your medicines at www.disposemymeds.org.          This list is accurate as of 5/3/18  8:43 PM.  Always use your most recent med list.                   Brand Name Dispense Instructions for use Diagnosis    ADVAIR DISKUS 250-50 MCG/DOSE diskus inhaler   Generic drug:  fluticasone-salmeterol      Inhale 1 puff into the lungs 2 times daily as needed        albuterol 108 (90 " Base) MCG/ACT Inhaler    PROAIR HFA/PROVENTIL HFA/VENTOLIN HFA     Inhale 2 puffs into the lungs        amitriptyline 10 MG tablet    ELAVIL    270 tablet    Take 3 tablets (30 mg) by mouth At Bedtime    Chronic migraine without aura without status migrainosus, not intractable       buprenorphine 10 MCG/HR WK patch    BUTRANS    4 patch    Place 1 patch onto the skin every 7 days    Postlaminectomy syndrome of lumbar region       butalbital-acetaminophen-caffeine -40 MG per tablet    FIORICET/ESGIC     TAKE ONE TABLET BY MOUTH EVERY 4 HOURS AS NEEDED        cetirizine 10 MG tablet    zyrTEC     Take 10 mg by mouth daily        COMPRESSION STOCKINGS     3 each    1 each daily 20-30 mmHg Thigh Length measure and fit, color and style per patient preference. Doshar n jose per need.    Orthostatic hypotension       DULoxetine 30 MG EC capsule    CYMBALTA    60 capsule    Take 1 capsule (30 mg) by mouth daily    Lumbar post-laminectomy syndrome       eletriptan 20 MG tablet    RELPAX    18 tablet    Take 1 tablet (20 mg) by mouth at onset of headache for migraine May repeat in 2 hours. Max 4 tablets/24 hours.    Chronic daily headache, Intractable migraine without aura and with status migrainosus       EPINEPHrine 0.3 MG/0.3ML injection 2-pack    EPIPEN/ADRENACLICK/or ANY BX GENERIC EQUIV     Inject 0.3 mg into the muscle        famotidine 20 MG tablet    PEPCID    90 tablet    Take 1 tablet (20 mg) by mouth At Bedtime    Hiatal hernia, Mast cell disorder       fluticasone 50 MCG/ACT spray    FLONASE    1 Bottle    Spray 2 sprays into both nostrils daily    Chronic rhinitis, unspecified type       linaclotide 145 MCG capsule    LINZESS    30 capsule    Take 1 capsule (145 mcg) by mouth every morning (before breakfast) Last refill until GI clinic follow up.    Chronic idiopathic constipation       metoclopramide 5 MG tablet    REGLAN    60 tablet    Take 1-2 tabs po Q 6 hrs as needed for nausea        midodrine 5 MG  tablet    PROAMATINE    180 tablet    Take 1 tablet (5 mg) by mouth 2 times daily Take 5mg in the morning and 5 mg 4-6 hours later. Do not take within 5 hours of laying down.    Orthostatic hypotension       MULTIVITAMIN PO      Take by mouth daily        omeprazole 20 MG CR capsule    priLOSEC    90 capsule    Take 1 capsule (20 mg) by mouth daily    Hiatal hernia       ondansetron 4 MG ODT tab    ZOFRAN-ODT    60 tablet    Take 1-2 tablets (4-8 mg) by mouth every 12 hours as needed for nausea    Migraine without status migrainosus, not intractable, unspecified migraine type       ONE DAILY MULTIPLE VITAMIN OR           PROBIOTIC DAILY PO      Take by mouth 2 times daily 2 packets daily        rizatriptan 5 MG ODT tab    MAXALT-MLT     TAKE 1-2 TABLETS (5-10 MG) BY MOUTH AT ONSET OF HEADACHE FOR MIGRAINE (MAX 30 MG IN 24 HOURS)        SUMAtriptan 6 MG/0.5ML pen injector kit    IMITREX STATDOSE    2 kit    Inject 0.5 mLs (6 mg) Subcutaneous at onset of headache for migraine (may repeat once after 2 hrs) May repeat in 1 hour. Max 12 mg/24 hours.    Chronic migraine without aura without status migrainosus, not intractable       SYNTHROID 25 MCG tablet   Generic drug:  levothyroxine      Take 25 mcg by mouth daily        tiZANidine 4 MG tablet    ZANAFLEX    90 tablet    Take 1 tablet (4 mg) by mouth 3 times daily as needed for muscle spasms    Post laminectomy syndrome       traMADol 50 MG tablet    ULTRAM    15 tablet    Take 1 tablet (50 mg) by mouth every 6 hours as needed for pain maximum 4 tablet(s) per day    Lumbar post-laminectomy syndrome       vitamin D 16382 UNIT capsule    ERGOCALCIFEROL          zonisamide 25 MG capsule    Zonegran    360 capsule    Take 125mg daily for 2 weeks, then 150mg    Occipital neuritis

## 2018-05-03 NOTE — PROGRESS NOTES
Neurosurgery Clinic   Date of Visit: 4/26/2018    Dear Dr. Galeano    We were happy to see Ada Welch, a pleasant 47 year old year old female for back pain.    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 She suffers chronic headaches, neck pain and back pain. She was seen in neurosurgery clinic in November by Dr. Moncada to establish with neurosurgery, in that case for her cervical spine. She had undergone an EMG which was not diagnostic for radiculopathy but was notable for abnormal spontaneous activity restricted to the left cervical paraspinal muscles.On physical exam she was noted to have decreased finger abduction and  in the bilateral upper extremities. She had bilaterally positive Moe's.. Otherwise her exam was normal.  Cervical MRI was reasonably normal. No obvious stenoses at any level.  There were flexion-extension views with the MRI and there was hypermobility noted although there was extensive motion artifact. There were extensive degenerative changes most pronounced at C4/5, C5/6, and C6/7.. There was abutment of the cord but there was residual space within the canal. In general it was felt she had generalized cervicalgia, bilateral C8 radiculopathy without radiographic evidence and hypermobility due to her Tom Danlos. The plan was for flexion-extension views of the cervical and lumbar spine, to wear her collar p.r.n. But to wean out of it to avoid a resultant atrophy which was possibly contributing to her generalized cervicalgia, and refer her back to the pain clinic where occipital nerve blocks might be considered. Recommending against Botox injections.. Surgery was not offered at that time. P.r.n. Follow-up    Since then you have seen her again, she spoke with you about the surgeries on her lumbar spine she had had in [place] and the more recent one here at the hands of Dr. Lucero. She has had ongoing numbness in the legs, specifically the left  leg and a recent MRI showing some mild impingement of the exiting left L5 nerve root. She would like to have an opinion about that and so she has had another set of EMGs,this time on her lower extremities and done by . She presents for an opinion today.    EMGs bilateral lower extremities performed 4/5/18  Interpretation the EMG is normal. There is no EMG evidence for left or right lumbar/sacral radiculopathy. There is no evidence for generalized myotonic disorder.     She describes her pain as present in the low lumbar spine radiating to the bilateral upper buttocks and the bilateral lateral thighs to just above the level of the knees.it is constant achy and sharp stiff sore to the touch and pressure-like. It is a little better on medication since October. But any motion aggravates it. At worst it is 10/10 screaming out loud pain. At best it drops to 4/10 and that is when she is lying still on her right side.    She has numbness present constantly in the left lateral anterior shin, described as tingling. She also has numbness from the waist down occasionally when she sits on the toilet.  It is not clear what particular instances bring that on. When this numbness appears she also experiences weakness of the bilateral legs. Again, it is randomly appearing, not related to any particular activity, not related to for example straining at stool or any other incident. This is also associated with difficulty with coordination and balance during most times.However she denies change in bowel or bladder control.    At last visit she had a essentially intact neurologic exam, a fairly clean MRI, a fusion that looked solid on MRI, but with multiple areas of pain, numbness greater on the left than the right, and back pain radiating to both bilateral lateral thighs I could not identify the source.  She had had prior dynamic views of the lumbar spine, these were unremarkable. I requested full length spine films to look  at overall balance; these have been done and reveal no coronal or sagittal imbalance.  Given the prior fusion I'm requested a CT scan to assess the status of the fusion.  The CT scan shows a nicely maturing fusion without evidence of pseudoarthrosis. She checks in today to discuss these results.    Patient Supplied Answers To the [facility] Pain Questionnaire  [facility] -  Patient Entered Questionnaire/Answers 5/3/2018   What number best describes your pain right now:  0 = No pain  to  10 = Worst pain imaginable 6   How would you describe the pain? burning, sharp, numbness, cutting, dull, aching, pressure   Which of the following worsen your pain? standing, sitting, walking, exercise, coughing / sneezing, urination, bowel movements   Which of the following improve or reduce your pain?  lying down, medication, relaxation   What number best describes your average pain for the past week:  0 = No pain  to  10 = Worst pain imaginable 6   What number best describes your LOWEST pain in past 24 hours:  0 = No pain  to  10 = Worst pain imaginable 4   What number best describes your WORST pain in past 24 hours:  0 = No pain  to  10 = Worst pain imaginable 8   When is your pain worst? Constant   What non-medicine treatments have you already had for your pain? pain clinic, physical therapy, relaxation training, acupuncture, chiropractic care, TENS (electrical stimulator), spine injections (shots), other nerve blocks, counseling, surgery, exercise, other   Have you tried treating your pain with medication?  Yes   Are you currently taking medications for your pain? Yes       Current Outpatient Prescriptions:      albuterol (PROAIR HFA/PROVENTIL HFA/VENTOLIN HFA) 108 (90 BASE) MCG/ACT Inhaler, Inhale 2 puffs into the lungs, Disp: , Rfl:      amitriptyline (ELAVIL) 10 MG tablet, Take 3 tablets (30 mg) by mouth At Bedtime, Disp: 270 tablet, Rfl: 3     buprenorphine (BUTRANS) 10 MCG/HR WK patch, Place 1 patch onto the skin every 7 days, Disp:  4 patch, Rfl: 1     butalbital-acetaminophen-caffeine (FIORICET/ESGIC) -40 MG per tablet, TAKE ONE TABLET BY MOUTH EVERY 4 HOURS AS NEEDED, Disp: , Rfl: 0     cetirizine (ZYRTEC) 10 MG tablet, Take 10 mg by mouth daily , Disp: , Rfl:      COMPRESSION STOCKINGS, 1 each daily 20-30 mmHg Thigh Length measure and fit, color and style per patient preference. Doff n jose per need., Disp: 3 each, Rfl: 3     DULoxetine (CYMBALTA) 30 MG EC capsule, Take 1 capsule (30 mg) by mouth daily, Disp: 60 capsule, Rfl: 1     eletriptan (RELPAX) 20 MG tablet, Take 1 tablet (20 mg) by mouth at onset of headache for migraine May repeat in 2 hours. Max 4 tablets/24 hours., Disp: 18 tablet, Rfl: 1     EPINEPHrine (EPIPEN/ADRENACLICK/OR ANY BX GENERIC EQUIV) 0.3 MG/0.3ML injection 2-pack, Inject 0.3 mg into the muscle, Disp: , Rfl:      famotidine (PEPCID) 20 MG tablet, Take 1 tablet (20 mg) by mouth At Bedtime, Disp: 90 tablet, Rfl: 1     fluticasone (FLONASE) 50 MCG/ACT spray, Spray 2 sprays into both nostrils daily, Disp: 1 Bottle, Rfl: 3     fluticasone-salmeterol (ADVAIR DISKUS) 250-50 MCG/DOSE diskus inhaler, Inhale 1 puff into the lungs 2 times daily as needed , Disp: , Rfl:      levothyroxine (SYNTHROID) 25 MCG tablet, Take 25 mcg by mouth daily, Disp: , Rfl:      linaclotide (LINZESS) 145 MCG capsule, Take 1 capsule (145 mcg) by mouth every morning (before breakfast) Last refill until GI clinic follow up., Disp: 30 capsule, Rfl: 1     metoclopramide (REGLAN) 5 MG tablet, Take 1-2 tabs po Q 6 hrs as needed for nausea, Disp: 60 tablet, Rfl: 3     midodrine (PROAMATINE) 5 MG tablet, Take 1 tablet (5 mg) by mouth 2 times daily Take 5mg in the morning and 5 mg 4-6 hours later. Do not take within 5 hours of laying down., Disp: 180 tablet, Rfl: 3     Multiple Vitamins-Minerals (MULTIVITAMIN PO), Take by mouth daily, Disp: , Rfl:      omeprazole (PRILOSEC) 20 MG CR capsule, Take 1 capsule (20 mg) by mouth daily, Disp: 90 capsule,  Rfl: 2     ondansetron (ZOFRAN-ODT) 4 MG ODT tab, Take 1-2 tablets (4-8 mg) by mouth every 12 hours as needed for nausea, Disp: 60 tablet, Rfl: 1     ONE DAILY MULTIPLE VITAMIN OR, , Disp: , Rfl:      Probiotic Product (PROBIOTIC DAILY PO), Take by mouth 2 times daily 2 packets daily, Disp: , Rfl:      rizatriptan (MAXALT-MLT) 5 MG ODT tab, TAKE 1-2 TABLETS (5-10 MG) BY MOUTH AT ONSET OF HEADACHE FOR MIGRAINE (MAX 30 MG IN 24 HOURS), Disp: , Rfl: 6     SUMAtriptan (IMITREX STATDOSE) 6 MG/0.5ML pen injector kit, Inject 0.5 mLs (6 mg) Subcutaneous at onset of headache for migraine (may repeat once after 2 hrs) May repeat in 1 hour. Max 12 mg/24 hours., Disp: 2 kit, Rfl: 1     tiZANidine (ZANAFLEX) 4 MG tablet, Take 1 tablet (4 mg) by mouth 3 times daily as needed for muscle spasms, Disp: 90 tablet, Rfl: 1     traMADol (ULTRAM) 50 MG tablet, Take 1 tablet (50 mg) by mouth every 6 hours as needed for pain maximum 4 tablet(s) per day, Disp: 15 tablet, Rfl: 0     vitamin D (ERGOCALCIFEROL) 96289 UNIT capsule, , Disp: , Rfl:      zonisamide (ZONEGRAN) 25 MG capsule, Take 125mg daily for 2 weeks, then 150mg, Disp: 360 capsule, Rfl: 3    Allergies   Allergen Reactions     Bee Venom Anaphylaxis, Difficulty breathing, Palpitations, Shortness Of Breath and Visual Disturbance     Patient does carry Epi-Pen     No Clinical Screening - See Comments Anaphylaxis     Chicken-Derived Products (Egg) Diarrhea and Nausea     Patient will self monitor  Other reaction(s): Nausea  Patient will self monitor  Other reaction(s): GI intolerance     Gabapentin      Gluten Meal      Lactose Dermatitis and Diarrhea     Milk Digestant Nausea     Patient is aware of milk sensitivity, will manage own diet     Milk Protein Extract      Other reaction(s): GI intolerance\  EGGS      Topiramate      Wheat Diarrhea     Wheat Bran Nausea     Patient will self monitor  Other reaction(s): Nausea  Patient will self monitor       Problem list, Past medical  "history, family, social:  reviewed in Epic.     OBJECTIVE:   /82 (BP Location: Right arm, Patient Position: Sitting, Cuff Size: Adult Regular)  Pulse 123  Temp 97.8  F (36.6  C) (Oral)  Resp 18  Ht 1.715 m (5' 7.5\")  Wt 88.1 kg (194 lb 4.8 oz)  SpO2 95%  BMI 29.98 kg/m2    Imaging:  These are the pertinent radiologist's findings from:  3/7/18 lumbar flexion-extension views  Postsurgical changes of interbody fusion L4-L5 and L5-S1. Mild disc  height loss at L3-4. No evidence of abnormal lumbar motion in  flexion/extension. Lower lumbar facet arthropathy.   MRI lumbar spine 4/20/18  With/without  L1-2: Small posterior disc bulge. No spinal canal or neural foraminal  stenosis..  L2-3: Small posterior disc bulge. No spinal canal or neural foraminal  stenosis..  L3-4: Small left eccentric posterior disc bulge partially narrows the  left lateral recess. Bilateral facet and ligamentum flavum thickening.  Mild spinal canal narrowing. Mild left neural foraminal narrowing. No  right neural foraminal stenosis.  L4-5: Postsurgical changes of interbody spinal fusion and left  hemilaminectomy. Left asymmetric posterior osteophyte mildly narrows  the left lateral recess. No spinal canal or neural foraminal stenosis.  L5-S1: Postsurgical changes of interbody fusion and left  hemilaminectomy. Facet hypertrophy bilaterally. Left asymmetric  posterior osteophyte mildly narrows the left lateral recess. No spinal  canal or neural foraminal stenosis.  See the full report in EPIC/Media tab.  I personally reviewed the images with the patient.      Exam:  *   Well developed, well nourished female found seated comfortably in exam room chair.  Is able to sit and rise independently.    Accompanied by male spouse and 2 children  *  Mental Status  A&O X3.  Bright, alert, affable, interactive. Language fluid, fund of knowledge intact. Good historian.  Mood and affect congruent and WNL.      ASSESSMENT/PLAN:  1. 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 Her MRI is fairly clean.  She has unremarkable dynamic imaging of the lumbar spine, no global coronal or sagittal imbalance, although a reverse cervical lordosis. She has a solid fusion on CT scan. Overall I do not see a source of her lumbar symptoms, and even if I could identify a pain source with Tom Zuniga I am extremely hesitant to recommend surgical intervention. It is very much a 2-edged sword. So we discussed all this frankly and I believe she is in general agreement with the philosophical take on this. I believe that her best options are nonoperative therapy, at-home management, and good health practices, weight control, gentle strengthening and stretching, healthy eating, sensible living. She is in general agreement. And so I have not made a specific return appointment but will be happy to see her again should the need arise.    I answered all of her questions, which indicated good understanding of the situation.  She is willing to move forward with the recommendations. I supplied her with business cards and telephone numbers for ease of contact.   It's been a pleasure participating in the care of this nice patient. We thank you for your confidence in the [facility], Department of Neurosurgery.      Best Regards,    Aga Ang PA-C  [facility]  Department of Neurosurgery  Phone: [phone]  Fax: [phone]        Total time: 60 minutes with more than 30 minutes spent in direct face to face contact reviewing films, providing education, counseling, non-operative therapies,and indications for surgery as well as further follow up.    This note was generated using voice recognition software. While edited for content some inaccurate phrasing may be found.

## 2018-05-04 ENCOUNTER — TELEPHONE (OUTPATIENT)
Dept: INTERNAL MEDICINE | Facility: CLINIC | Age: 48
End: 2018-05-04

## 2018-05-04 NOTE — TELEPHONE ENCOUNTER
Morgan Eaton,  Can you please call patient. I received a request for disability paperwork.  The documentation is quite extensive. I am not trained, nor do we have the appropriate facilities, to accurately fill this out for her.  To complete this paperwork, I suggest she seek out a Disability Doctor trained to do these sorts of evaluations. I am happy to provide documentation of her recent medical issues, but I cannot document her physical limitations without an objective evaluation.  Thanks,  Ellyn Rivera MD  Internal Medicine

## 2018-05-07 NOTE — TELEPHONE ENCOUNTER
[facility] Call Center    Phone Message    May a detailed message be left on voicemail: yes    Reason for Call: Other: Attn: Uma.  Pt called to return Uma's call.  Please have Shannon call pt back.     Action Taken: Other: P [facility]

## 2018-05-08 NOTE — TELEPHONE ENCOUNTER
Discussed recommendations with patient.  Given numbers for occupational medicine.  Patient verbalizes understanding.  Uma Watts LPN

## 2018-05-09 ENCOUNTER — ALLIED HEALTH/NURSE VISIT (OUTPATIENT)
Dept: CARDIOLOGY | Facility: CLINIC | Age: 48
End: 2018-05-09
Attending: INTERNAL MEDICINE
Payer: COMMERCIAL

## 2018-05-09 DIAGNOSIS — J31.0 CHRONIC RHINITIS, UNSPECIFIED TYPE: ICD-10-CM

## 2018-05-09 DIAGNOSIS — R00.0 TACHYCARDIA: Primary | ICD-10-CM

## 2018-05-09 PROCEDURE — 93298 REM INTERROG DEV EVAL SCRMS: CPT | Performed by: INTERNAL MEDICINE

## 2018-05-09 PROCEDURE — 93299 HC INTERROGATION DEVICE EVAL REMOTE, LOOP RECORDER: CPT | Mod: ZF

## 2018-05-09 NOTE — MR AVS SNAPSHOT
After Visit Summary   5/9/2018    Ada Welch    MRN: [id number]           Patient Information     Date Of Birth          1970        Visit Information        Provider Department      5/9/2018 6:00 AM UC ICD REMOTE [facility]        Today's Diagnoses     Tachycardia    -  1       Follow-ups after your visit        Your next 10 appointments already scheduled     May 15, 2018 10:30 AM CDT   Return Visit with Ruth Esparza PT   [facility] ([facility])    [address]  [address]  [facility] [address]   [phone]            May 18, 2018  5:00 PM CDT   (Arrive by 4:45 PM)   Return Visit with Seth Galeano MD   [facility]rology ([facility])    [address] [place] [id number] [phone]            Jun 07, 2018  9:30 AM CDT   Ech Complete with JORGE LECHCR1   [facility] Echo ([facility])    [address] [place] [id number] [phone]           1. Please bring or wear a comfortable two-piece outfit. 2. You may eat, drink and take your normal medicines. 3. For any questions that cannot be answered, please contact the ordering physician            Jun 07, 2018 10:30 AM CDT   (Arrive by 10:15 AM)   Implantable Loop Recorder with  Cv Device 1   [facility] ([facility])    [address] [place] [address]  [address] [place] [id number] [phone]            Jun 07, 2018 11:00 AM CDT   (Arrive by 10:45 AM)   RETURN ARRHYTHMIA with FIDELINA [facility] ([facility])    [address] [place] [address]  [address] [place] [id number] [phone]            Jul 11, 2018 11:15 AM CDT   (Arrive by 11:00 AM)   Return Visit with Wade Singh MD   [facility] ([facility])     [address]  4[address]  [facility] [id number] [phone]            Jul 23, 2018 12:15 PM CDT   (Arrive by 12:00 PM)   New Allergy with Vahe Rodriguez MD   [facility] ([facility] [place] [facility])    [address]  [address]  [facility] [id number] [phone]           Do not take anti-histamines or Zantac for seven day prior to your appointment.            Oct 25, 2018  9:25 AM CDT   (Arrive by 9:10 AM)   Return Visit with Ellyn Rivera MD   [facility] ([facility])    [address]  [address] [facility] [phone] [phone]              Who to contact     If you have questions or need follow up information about today's clinic visit or your schedule please contact [facility] directly at [phone].  Normal or non-critical lab and imaging results will be communicated to you by Beabloot, letter or phone within 4 business days after the clinic has received the results. If you do not hear from us within 7 days, please contact the clinic through Beabloot or phone. If you have a critical or abnormal lab result, we will notify you by phone as soon as possible.  Submit refill requests through [employer] or call your pharmacy and they will forward the refill request to us. Please allow 3 business days for your refill to be completed.          Additional Information About Your Visit        [employer] Information     [employer] gives you secure access to your electronic health record. If you see a primary care provider, you can also send messages to your care team and make appointments. If you have questions, please call your primary care clinic.  If you do not have a primary care provider, please call [phone] and they will assist you.        Care EveryWhere ID     This is your Care EveryWhere ID. This could be used by other organizations to access your [facility]  records  [id number]         Blood Pressure from Last 3 Encounters:   05/03/18 126/82   05/01/18 151/89   04/26/18 122/83    Weight from Last 3 Encounters:   05/03/18 88.1 kg (194 lb 4.8 oz)   05/01/18 87.1 kg (192 lb)   04/26/18 88 kg (193 lb 14.4 oz)              We Performed the Following     REMOTE LOOP/OPTIVOL INTERROGATION        Primary Care Provider Office Phone # Fax #    CoilaDenise Rivera [phone] [address] [address]        Equal Access to Services     [facility]: Hadii aad ku hadasho Sonegrito, waaxda luqadaha, qaybta kaalmada adealanyada, odette dykes . So [facility] [phone].    ATENCIÓN: Si habla español, tiene a sweeney disposición servicios gratuitos de asistencia lingüística. [facility] [phone].    We comply with applicable federal civil rights laws and [place] laws. We do not discriminate on the basis of race, color, national origin, age, disability, sex, sexual orientation, or gender identity.            Thank you!     Thank you for choosing [facility]  for your care. Our goal is always to provide you with excellent care. Hearing back from our patients is one way we can continue to improve our services. Please take a few minutes to complete the written survey that you may receive in the mail after your visit with us. Thank you!             Your Updated Medication List - Protect others around you: Learn how to safely use, store and throw away your medicines at www.disposemymeds.org.          This list is accurate as of 5/9/18 11:59 PM.  Always use your most recent med list.                   Brand Name Dispense Instructions for use Diagnosis    ADVAIR DISKUS 250-50 MCG/DOSE diskus inhaler   Generic drug:  fluticasone-salmeterol      Inhale 1 puff into the lungs 2 times daily as needed        albuterol 108 (90 Base) MCG/ACT Inhaler    PROAIR HFA/PROVENTIL HFA/VENTOLIN HFA     Inhale 2 puffs into the lungs         amitriptyline 10 MG tablet    ELAVIL    270 tablet    Take 3 tablets (30 mg) by mouth At Bedtime    Chronic migraine without aura without status migrainosus, not intractable       buprenorphine 10 MCG/HR WK patch    BUTRANS    4 patch    Place 1 patch onto the skin every 7 days    Postlaminectomy syndrome of lumbar region       butalbital-acetaminophen-caffeine -40 MG per tablet    FIORICET/ESGIC     TAKE ONE TABLET BY MOUTH EVERY 4 HOURS AS NEEDED        cetirizine 10 MG tablet    zyrTEC     Take 10 mg by mouth daily        COMPRESSION STOCKINGS     3 each    1 each daily 20-30 mmHg Thigh Length measure and fit, color and style per patient preference. Doff n jose per need.    Orthostatic hypotension       DULoxetine 30 MG EC capsule    CYMBALTA    60 capsule    Take 1 capsule (30 mg) by mouth daily    Lumbar post-laminectomy syndrome       eletriptan 20 MG tablet    RELPAX    18 tablet    Take 1 tablet (20 mg) by mouth at onset of headache for migraine May repeat in 2 hours. Max 4 tablets/24 hours.    Chronic daily headache, Intractable migraine without aura and with status migrainosus       EPINEPHrine 0.3 MG/0.3ML injection 2-pack    EPIPEN/ADRENACLICK/or ANY BX GENERIC EQUIV     Inject 0.3 mg into the muscle        famotidine 20 MG tablet    PEPCID    90 tablet    Take 1 tablet (20 mg) by mouth At Bedtime    Hiatal hernia, Mast cell disorder       fluticasone 50 MCG/ACT spray    FLONASE    1 Bottle    Spray 2 sprays into both nostrils daily    Chronic rhinitis, unspecified type       linaclotide 145 MCG capsule    LINZESS    30 capsule    Take 1 capsule (145 mcg) by mouth every morning (before breakfast) Last refill until GI clinic follow up.    Chronic idiopathic constipation       metoclopramide 5 MG tablet    REGLAN    60 tablet    Take 1-2 tabs po Q 6 hrs as needed for nausea        midodrine 5 MG tablet    PROAMATINE    180 tablet    Take 1 tablet (5 mg) by mouth 2 times daily Take 5mg in the  morning and 5 mg 4-6 hours later. Do not take within 5 hours of laying down.    Orthostatic hypotension       MULTIVITAMIN PO      Take by mouth daily        omeprazole 20 MG CR capsule    priLOSEC    90 capsule    Take 1 capsule (20 mg) by mouth daily    Hiatal hernia       ondansetron 4 MG ODT tab    ZOFRAN-ODT    60 tablet    Take 1-2 tablets (4-8 mg) by mouth every 12 hours as needed for nausea    Migraine without status migrainosus, not intractable, unspecified migraine type       ONE DAILY MULTIPLE VITAMIN OR           PROBIOTIC DAILY PO      Take by mouth 2 times daily 2 packets daily        rizatriptan 5 MG ODT tab    MAXALT-MLT     TAKE 1-2 TABLETS (5-10 MG) BY MOUTH AT ONSET OF HEADACHE FOR MIGRAINE (MAX 30 MG IN 24 HOURS)        SUMAtriptan 6 MG/0.5ML pen injector kit    IMITREX STATDOSE    2 kit    Inject 0.5 mLs (6 mg) Subcutaneous at onset of headache for migraine (may repeat once after 2 hrs) May repeat in 1 hour. Max 12 mg/24 hours.    Chronic migraine without aura without status migrainosus, not intractable       SYNTHROID 25 MCG tablet   Generic drug:  levothyroxine      Take 25 mcg by mouth daily        tiZANidine 4 MG tablet    ZANAFLEX    90 tablet    Take 1 tablet (4 mg) by mouth 3 times daily as needed for muscle spasms    Post laminectomy syndrome       traMADol 50 MG tablet    ULTRAM    15 tablet    Take 1 tablet (50 mg) by mouth every 6 hours as needed for pain maximum 4 tablet(s) per day    Lumbar post-laminectomy syndrome       vitamin D 00876 UNIT capsule    ERGOCALCIFEROL          zonisamide 25 MG capsule    Zonegran    360 capsule    Take 125mg daily for 2 weeks, then 150mg    Occipital neuritis

## 2018-05-10 ENCOUNTER — TRANSFERRED RECORDS (OUTPATIENT)
Dept: HEALTH INFORMATION MANAGEMENT | Facility: CLINIC | Age: 48
End: 2018-05-10

## 2018-05-10 DIAGNOSIS — K59.04 CHRONIC IDIOPATHIC CONSTIPATION: ICD-10-CM

## 2018-05-10 NOTE — PROGRESS NOTES
Preliminary Device Interrogation Results.  Final physician signed paceart report to be scanned and attached.    "[employer] remote ILR transmission received and reviewed. Device transmission sent per MD orders. Device reached RRT on 4/15/18. Her presenting rhythm is  bpm. Since 3/8/18: 1 pause and 63 tachy episodes recorded. The pause episode shows undersensing. The tachy episodes show narrow-complex tachycardia with rates 167- 171 bpm. Normal device function. Plan for pt to RTC in 1 month as scheduled.  Remote ILR transmission

## 2018-05-11 RX ORDER — FLUTICASONE PROPIONATE 50 MCG
SPRAY, SUSPENSION (ML) NASAL
Qty: 16 ML | Refills: 3 | Status: SHIPPED | OUTPATIENT
Start: 2018-05-11 | End: 2018-07-19

## 2018-05-12 NOTE — TELEPHONE ENCOUNTER
linaclotide (LINZESS) 145 MCG capsule  Last Written Prescription Date:  3/2/181  Last Fill Quantity: 30,   # refills: 1  Last Office Visit : 11/22/17  Future Office visit: 10/25/18    Routing  Because: per last rf must be seen in GI clinic.

## 2018-05-14 ENCOUNTER — MEDICAL CORRESPONDENCE (OUTPATIENT)
Dept: HEALTH INFORMATION MANAGEMENT | Facility: CLINIC | Age: 48
End: 2018-05-14

## 2018-05-14 ENCOUNTER — TELEPHONE (OUTPATIENT)
Dept: GASTROENTEROLOGY | Facility: CLINIC | Age: 48
End: 2018-05-14

## 2018-05-14 DIAGNOSIS — K59.04 CHRONIC IDIOPATHIC CONSTIPATION: ICD-10-CM

## 2018-05-14 NOTE — TELEPHONE ENCOUNTER
[facility] Call Center    Phone Message    May a detailed message be left on voicemail: yes    Reason for Call: Medication Refill Request    Has the patient contacted the pharmacy for the refill? Yes   Name of medication being requested: Linzess  Provider who prescribed the medication: PETERSON Griffith   Pharmacy: Same Pharmacy   Date medication is needed: Asap         Action Taken: Message routed to:  [facility] GI

## 2018-05-14 NOTE — TELEPHONE ENCOUNTER
Spoke to patient. Scheduled for GI visit with Stephen soares.     1 month refill sent to preferred pharmacy.

## 2018-05-17 ENCOUNTER — OFFICE VISIT (OUTPATIENT)
Dept: PALLIATIVE MEDICINE | Facility: CLINIC | Age: 48
End: 2018-05-17
Payer: COMMERCIAL

## 2018-05-17 ENCOUNTER — TELEPHONE (OUTPATIENT)
Dept: CARDIOLOGY | Facility: CLINIC | Age: 48
End: 2018-05-17

## 2018-05-17 DIAGNOSIS — G89.4 PAIN SYNDROME, CHRONIC: Primary | ICD-10-CM

## 2018-05-17 DIAGNOSIS — Q79.60 EHLERS-DANLOS SYNDROME: ICD-10-CM

## 2018-05-17 PROCEDURE — 97530 THERAPEUTIC ACTIVITIES: CPT | Mod: GP | Performed by: PHYSICAL THERAPIST

## 2018-05-17 NOTE — TELEPHONE ENCOUNTER
[facility] Call Center    Phone Message    May a detailed message be left on voicemail: yes    Reason for Call: Symptoms or Concerns     If patient has red-flag symptoms, warm transfer to triage line    Current symptom or concern: Pt's blood pressure has been going all over the place and she doesn't know why. She hasn't been able to take her new medication as she does not know when she will be awake for at least 5 hours. Please give her a call back to discuss.     Symptoms have been present for:  2 month(s)    Has patient previously been seen for this? Yes    By : Tiara Fletcher    Date: 3/2/18    Are there any new or worsening symptoms? No      Action Taken: Message routed to:  [facility] Cardiology

## 2018-05-17 NOTE — PROGRESS NOTES
PAIN PHYSICAL THERAPY PROGRESS NOTE  Patient Name: Ada Welch      YOB: 1970     Medical Record Number: [id number]  Diagnosis:    Pain syndrome, chronic  Tom-Danlos syndrome    Visit: 2/13    Subjective: Patient reports having difficulty with low blood pressure/nausea and increased neck, bilateral shoulders, left anterior shoulder and left scapular pain since getting hit by a beach ball at the back of her head on Saturday. Feels she may have dislocated her neck and is wearing neck brace today.  Able to attend [facility] yesterday which was helpful.  Bought a bicycle and able to ride x 15 minutes, but noted maybe overdoing activity.  Uses self care strategies including heat, laying flat without pillow and using cervical roll to reduce pain.  Rates pain 6-7/10 and fatigue 9/10 level today    Self Care  Walking/Aerobic Activity: indep with bike/pool  Breathing/Relaxation: issued CD  Heat/Ice: issued rice sock  Mini Breaks: issued handout  Pacing: issued handout      Objective Findings:  OBSERVATION: Wearing neck brace and monitoring blood pressure throughout session.  Pt seems knowledgeable regarding current self care strategies.  Patient was educated about the relationship between tissue injury and pain, and that pain is an output by the brain.  This includes the principle that tissue injury does not have to hurt, and that you can have pain without having tissue injury, especially with hypersensitivity in the nervous system. Patient was educated about the importance of setting both short and long-term goals and instructed in how to break tasks down in order to make them more achievable. Mini breaks and Pacing principles discussed.  Patient was instructed in the benefits of aerobic exercise and encouraged to start a cardio program within guidelines of cardiologist and PCP.  They were given a tracking sheet to help monitor their progress and instructed to increase their time within their  tolerance.  The patient was provided with specific parameters to safely increase exercise duration. Patient instructed in diaphragmatic breathing in order to help calm the nervous system and reduce stress.  This will also help to decrease use of accessory muscles, promote control of the diaphragm muscle, increase trunk mobility, and to increase oxygen/gas intake.    Treatment Interventions:  Therapeutic Activity:   For 45 minutes including TNE and self cares as above.  __________________________________________________________________    Assessment:  Ongoing Functional Limitations Include:  Patient tolerated/responded well to treatment    Intensity Level: 3 (1=low intensity; 5=high intensity)  Demonstrates/Verbalizes Technique: 5 (1= poor technique-difficulty performing exercises,significant cues required; 5= good technique-performs exercises without cues)  Body Awareness: 4 (1=low awareness; 5=high awareness)  Posture/Stability: 4 (1= poor posture, stability; 5= good posture, stability)  Motivational Level: Ask questions, Eager to learn and Cooperative  Response to Teaching: cooperative  Factors that affect learning: None    _______________________________________________________________________  Plan of Care  Continue PT to support reactivation and integration of self regulation pain management skills;  Continue with prescribed plan of care - progress as tolerated.  Focus next session will be on: Pt has completed chronic pain management education and instruction in self cares strategies.  Pt has multiple medical issues that she will be following up on at this time.  Will hold on Pain PT for now as patient is independent with self care strategies.  Could consider to add kinesthetic body scan and gentle functional movement exercises in the future as able.  No further appointments scheduled at this time.     Present:  FREDY     Total Visit Time:  45 minutes    Therapist: Ruth Esparza PT             Date:  5/17/2018

## 2018-05-17 NOTE — MR AVS SNAPSHOT
After Visit Summary   5/17/2018    Ada Welch    MRN: [id number]           Patient Information     Date Of Birth          1970        Visit Information        Provider Department      5/17/2018 1:15 PM Ruth Esparza, PT [facility]        Today's Diagnoses     Pain syndrome, chronic    -  1    Tom-Danlos syndrome           Follow-ups after your visit        Your next 10 appointments already scheduled     May 18, 2018  3:00 PM CDT   US HEAD NECK SOFT TISSUE with US3   [facility] ([facility])    [address] [place] [address] [id number] [phone]           Please bring a list of your medicines (including vitamins, minerals and over-the-counter drugs). Also, tell your doctor about any allergies you may have. Wear comfortable clothes and leave your valuables at home.  You do not need to do anything special to prepare for your exam.  Please call the Imaging Department at your exam site with any questions.            May 18, 2018  5:00 PM CDT   (Arrive by 4:45 PM)   Return Visit with Seth Galeano MD   [facility] ([facility])    [address] [place] [address] [id number] [phone]            Jun 07, 2018  9:30 AM CDT   Ech Complete with UCECHCR1   [facility])    [address] [place] [address] [phone]           1. Please bring or wear a comfortable two-piece outfit. 2. You may eat, drink and take your normal medicines. 3. For any questions that cannot be answered, please contact the ordering physician            Jun 07, 2018 10:30 AM CDT   (Arrive by 10:15 AM)   Implantable Loop Recorder with  Cv Device 1   [facility] ([facility])    [address] [place] [address] [id number] [phone]            Jun 07, 2018 11:00 AM CDT    (Arrive by 10:45 AM)   RETURN ARRHYTHMIA with FIDELINA Mora CNP   [facility] ([facility])    [address] [place] [address]  [address] [place] [address] [phone] [phone]            Jun 12, 2018  2:40 PM CDT   (Arrive by 2:25 PM)   Return Visit with Stephen Xiong PA-C   [facility] ([facility])    [address] [place] [id number] [phone]            Jul 11, 2018 11:15 AM CDT   (Arrive by 11:00 AM)   Return Visit with Wade Singh MD   [facility] ([facility])    [address] [place] [address] [phone]            Jul 23, 2018 12:15 PM CDT   (Arrive by 12:00 PM)   New Allergy with Vahe Rodriguez MD   [facility] ([facility])    [address] [place] [address]  [address] [place] [address] [id number] [phone]           Do not take anti-histamines or Zantac for seven day prior to your appointment.            Aug 08, 2018  4:20 PM CDT   (Arrive by 4:05 PM)   Return Visit with Juana Griffith MD   [facility] ([facility])    [address] [place] [phone]            Oct 25, 2018  9:25 AM CDT   (Arrive by 9:10 AM)   Return Visit with Ellyn Rivera MD   [facility] ([facility])    [address] [place] [phone]              Who to contact     If you have questions or need follow up information about today's clinic visit or your schedule please contact [facility] directly at [phone].  Normal or non-critical lab and imaging results will be communicated to you by MyChart, letter or phone within 4 business days after the  clinic has received the results. If you do not hear from us within 7 days, please contact the clinic through [facility] or phone. If you have a critical or abnormal lab result, we will notify you by phone as soon as possible.  Submit refill requests through [facility] or call your pharmacy and they will forward the refill request to us. Please allow 3 business days for your refill to be completed.          Additional Information About Your Visit        [employer] Information     [facility] gives you secure access to your electronic health record. If you see a primary care provider, you can also send messages to your care team and make appointments. If you have questions, please call your primary care clinic.  If you do not have a primary care provider, please call [phone] and they will assist you.        Care EveryWhere ID     This is your Care EveryWhere ID. This could be used by other organizations to access your [facility] medical records  [id number]         Blood Pressure from Last 3 Encounters:   05/03/18 126/82   05/01/18 151/89   04/26/18 122/83    Weight from Last 3 Encounters:   05/03/18 88.1 kg (194 lb 4.8 oz)   05/01/18 87.1 kg (192 lb)   04/26/18 88 kg (193 lb 14.4 oz)              We Performed the Following     THERAPEUTIC ACTIVITIES        Primary Care Provider Office Phone # Fax #    Ellyn Rivera [phone] [address] [address]        Equal Access to Services     CHRISTINA GUY AH: Hadii kia ku hadasho Sonoyali, waaxda luqadaha, qaybta kaalmada adeegyada, odette tang. So [facility] [phone].    ATENCIÓN: Si habla español, tiene a sweeney disposición servicios gratuitos de asistencia lingüística. Llame al [phone].    We comply with applicable federal civil rights laws and [place] laws. We do not discriminate on the basis of race, color, national origin, age, disability, sex, sexual orientation, or gender identity.             Thank you!     Thank you for choosing [facility]  for your care. Our goal is always to provide you with excellent care. Hearing back from our patients is one way we can continue to improve our services. Please take a few minutes to complete the written survey that you may receive in the mail after your visit with us. Thank you!             Your Updated Medication List - Protect others around you: Learn how to safely use, store and throw away your medicines at www.disposemymeds.org.          This list is accurate as of 5/17/18  2:38 PM.  Always use your most recent med list.                   Brand Name Dispense Instructions for use Diagnosis    ADVAIR DISKUS 250-50 MCG/DOSE diskus inhaler   Generic drug:  fluticasone-salmeterol      Inhale 1 puff into the lungs 2 times daily as needed        albuterol 108 (90 Base) MCG/ACT Inhaler    PROAIR HFA/PROVENTIL HFA/VENTOLIN HFA     Inhale 2 puffs into the lungs        amitriptyline 10 MG tablet    ELAVIL    270 tablet    Take 3 tablets (30 mg) by mouth At Bedtime    Chronic migraine without aura without status migrainosus, not intractable       buprenorphine 10 MCG/HR WK patch    BUTRANS    4 patch    Place 1 patch onto the skin every 7 days    Postlaminectomy syndrome of lumbar region       butalbital-acetaminophen-caffeine -40 MG per tablet    FIORICET/ESGIC     TAKE ONE TABLET BY MOUTH EVERY 4 HOURS AS NEEDED        cetirizine 10 MG tablet    zyrTEC     Take 10 mg by mouth daily        COMPRESSION STOCKINGS     3 each    1 each daily 20-30 mmHg Thigh Length measure and fit, color and style per patient preference. Doshar n jose per need.    Orthostatic hypotension       DULoxetine 30 MG EC capsule    CYMBALTA    60 capsule    Take 1 capsule (30 mg) by mouth daily    Lumbar post-laminectomy syndrome       eletriptan 20 MG tablet    RELPAX    18 tablet    Take 1 tablet (20 mg) by mouth at onset of headache for migraine May repeat in 2 hours. Max 4  tablets/24 hours.    Chronic daily headache, Intractable migraine without aura and with status migrainosus       EPINEPHrine 0.3 MG/0.3ML injection 2-pack    EPIPEN/ADRENACLICK/or ANY BX GENERIC EQUIV     Inject 0.3 mg into the muscle        famotidine 20 MG tablet    PEPCID    90 tablet    Take 1 tablet (20 mg) by mouth At Bedtime    Hiatal hernia, Mast cell disorder       fluticasone 50 MCG/ACT spray    FLONASE    16 mL    INSTILL 2 SPRAYS INTO BOTH NOSTRILS DAILY    Chronic rhinitis, unspecified type       linaclotide 145 MCG capsule    LINZESS    30 capsule    Take 1 capsule (145 mcg) by mouth every morning (before breakfast) Last refill until GI clinic follow up.    Chronic idiopathic constipation       metoclopramide 5 MG tablet    REGLAN    60 tablet    Take 1-2 tabs po Q 6 hrs as needed for nausea        midodrine 5 MG tablet    PROAMATINE    180 tablet    Take 1 tablet (5 mg) by mouth 2 times daily Take 5mg in the morning and 5 mg 4-6 hours later. Do not take within 5 hours of laying down.    Orthostatic hypotension       MULTIVITAMIN PO      Take by mouth daily        omeprazole 20 MG CR capsule    priLOSEC    90 capsule    Take 1 capsule (20 mg) by mouth daily    Hiatal hernia       ondansetron 4 MG ODT tab    ZOFRAN-ODT    60 tablet    Take 1-2 tablets (4-8 mg) by mouth every 12 hours as needed for nausea    Migraine without status migrainosus, not intractable, unspecified migraine type       ONE DAILY MULTIPLE VITAMIN OR           PROBIOTIC DAILY PO      Take by mouth 2 times daily 2 packets daily        rizatriptan 5 MG ODT tab    MAXALT-MLT     TAKE 1-2 TABLETS (5-10 MG) BY MOUTH AT ONSET OF HEADACHE FOR MIGRAINE (MAX 30 MG IN 24 HOURS)        SUMAtriptan 6 MG/0.5ML pen injector kit    IMITREX STATDOSE    2 kit    Inject 0.5 mLs (6 mg) Subcutaneous at onset of headache for migraine (may repeat once after 2 hrs) May repeat in 1 hour. Max 12 mg/24 hours.    Chronic migraine without aura without status  migrainosus, not intractable       SYNTHROID 25 MCG tablet   Generic drug:  levothyroxine      Take 25 mcg by mouth daily        tiZANidine 4 MG tablet    ZANAFLEX    90 tablet    Take 1 tablet (4 mg) by mouth 3 times daily as needed for muscle spasms    Post laminectomy syndrome       traMADol 50 MG tablet    ULTRAM    15 tablet    Take 1 tablet (50 mg) by mouth every 6 hours as needed for pain maximum 4 tablet(s) per day    Lumbar post-laminectomy syndrome       vitamin D 01374 UNIT capsule    ERGOCALCIFEROL          zonisamide 25 MG capsule    Zonegran    360 capsule    Take 125mg daily for 2 weeks, then 150mg    Occipital neuritis

## 2018-05-18 ENCOUNTER — OFFICE VISIT (OUTPATIENT)
Dept: NEUROLOGY | Facility: CLINIC | Age: 48
End: 2018-05-18
Payer: MEDICARE

## 2018-05-18 ENCOUNTER — RADIANT APPOINTMENT (OUTPATIENT)
Dept: ULTRASOUND IMAGING | Facility: CLINIC | Age: 48
End: 2018-05-18
Attending: INTERNAL MEDICINE
Payer: MEDICARE

## 2018-05-18 VITALS
BODY MASS INDEX: 30.07 KG/M2 | OXYGEN SATURATION: 98 % | RESPIRATION RATE: 24 BRPM | TEMPERATURE: 97.8 F | HEART RATE: 76 BPM | WEIGHT: 198.4 LBS | HEIGHT: 68 IN

## 2018-05-18 DIAGNOSIS — Q79.60 EHLERS-DANLOS SYNDROME: Primary | ICD-10-CM

## 2018-05-18 DIAGNOSIS — R93.0 ABNORMAL MAGNETIC RESONANCE IMAGING OF HEAD: ICD-10-CM

## 2018-05-18 DIAGNOSIS — R59.1 LYMPHADENOPATHY: ICD-10-CM

## 2018-05-18 DIAGNOSIS — M54.2 CERVICALGIA: ICD-10-CM

## 2018-05-18 ASSESSMENT — ENCOUNTER SYMPTOMS
DECREASED LIBIDO: 0
SHORTNESS OF BREATH: 1
ABDOMINAL PAIN: 1
MEMORY LOSS: 1
BRUISES/BLEEDS EASILY: 1
TASTE DISTURBANCE: 1
VOMITING: 1
LOSS OF CONSCIOUSNESS: 0
NUMBNESS: 1
RECTAL PAIN: 0
ORTHOPNEA: 1
HEMOPTYSIS: 0
NECK PAIN: 1
PARALYSIS: 0
NAIL CHANGES: 0
SPEECH CHANGE: 0
EYE REDNESS: 1
INCREASED ENERGY: 1
DOUBLE VISION: 1
SEIZURES: 0
POOR WOUND HEALING: 1
SNORES LOUDLY: 1
SKIN CHANGES: 1
SLEEP DISTURBANCES DUE TO BREATHING: 1
DECREASED APPETITE: 1
JAUNDICE: 0
WEIGHT LOSS: 0
TROUBLE SWALLOWING: 1
WEIGHT GAIN: 0
SINUS PAIN: 1
CONSTIPATION: 1
DYSPNEA ON EXERTION: 1
CHILLS: 1
PALPITATIONS: 1
FLANK PAIN: 1
HYPOTENSION: 1
HALLUCINATIONS: 0
HEARTBURN: 1
SINUS CONGESTION: 1
MYALGIAS: 1
LIGHT-HEADEDNESS: 1
FATIGUE: 1
WEAKNESS: 1
NAUSEA: 1
EYE IRRITATION: 1
EXERCISE INTOLERANCE: 1
NIGHT SWEATS: 1
BLOATING: 1
SMELL DISTURBANCE: 0
DIZZINESS: 1
BOWEL INCONTINENCE: 0
BACK PAIN: 1
NECK MASS: 1
EYE WATERING: 1
LEG PAIN: 1
TINGLING: 1
JOINT SWELLING: 1
POSTURAL DYSPNEA: 1
HEADACHES: 1
SPUTUM PRODUCTION: 1
HEMATURIA: 0
DIARRHEA: 1
DIFFICULTY URINATING: 1
COUGH DISTURBING SLEEP: 1
DYSURIA: 1
FEVER: 1
ARTHRALGIAS: 1
POLYDIPSIA: 1
MUSCLE CRAMPS: 1
DISTURBANCES IN COORDINATION: 1
POLYPHAGIA: 0
SORE THROAT: 0
WHEEZING: 1
SWOLLEN GLANDS: 1
COUGH: 1
HOT FLASHES: 0
TREMORS: 1
STIFFNESS: 1
MUSCLE WEAKNESS: 1
HOARSE VOICE: 1
HYPERTENSION: 1
EYE PAIN: 0
SYNCOPE: 0
BLOOD IN STOOL: 0
ALTERED TEMPERATURE REGULATION: 1

## 2018-05-18 ASSESSMENT — PAIN SCALES - GENERAL: PAINLEVEL: SEVERE PAIN (6)

## 2018-05-18 NOTE — LETTER
2018       RE: Ada Welch  [address] [place] [address]     Dear Colleague,    Thank you for referring your patient, Ada Welch, to the [facility] at [facility]. Please see a copy of my visit note below.    Service Date: 2018      Ellyn Rivera MD   [facility]    [address]   [place] [address]      RE: Ada Welch   MRN: [id number]   : 1970      Dear Dr. Rivera:      This is in regard to followup on Ada Welch.  The patient returned today with worsening neck and head discomfort.      The patient said that this last  one of her children was playing with a large beach ball.  It ended up going on a roof.  The patient was seated and it did strike either her head or her neck as it fell off.  Her , Flakita, estimated it was about a 6 feet drop.  She said this was a glancing type of blow and probably it was just a mild strike.  With it, though, she did not flinch her neck and she has had continued neck pain since.  She said it was extremely severe for the first 2 days and Wednesday she was better.  Then she started to struggle with her blood pressure.  She said it was as low as 60/40, but then later in the day it would go higher.  She did discuss this with her cardiology CNP, La Fletcher, and she was told to continue on midodrine for at least 2 more weeks.  She is going to have followup with Dr. Russell.  She had been referred to Dr. Rip Isbell regarding her Tom-Danlos syndrome and dysautonomia at [facility] but has not gone ahead with the consult pending whether I thought it was appropriate or not.  She and her  did review with me her pain physical therapy progress note.  She did see that provider this week.  The patient did not get any hands on therapy evidently that day from her, but has continued to get physical therapy including myofascial release through [facility].   "She feels that has been helpful and she feels that the breathing exercises are beneficial too.  She described what she felt was \"subluxations\" causing \"CSF blockage.\"  She did go over these concerns with me.  I did review with her and her  previous records of Dr. Moncada, her neurosurgeon, and in particular did review her findings regarding the MRI scans and CT scans that were done in the past.  I also reviewed with her her last MRI scan of the brain and she does have a mild degree of cerebellar ectopia.  The patient had been made aware of major concerns [place] I saw her last year.  The patient has never had a CSF flow study.  I did discuss this with her and her  and she did consent now to having an MRI scan with a cine study to evaluate CSF flow.  I think this would help settle whether she has any significant CSF blockage.  I did discuss with her her blood pressure again and the need to have careful followup with Dr. Russell.  She decided that she would go ahead with consultation with Dr. Isbell at [facility].      Neurologic examination today showed her blood pressure was 110/70 with a pulse of 66.  She had a regular without gallops or murmurs.  She had reduced range of motion of her neck in all directions including forward flexion.  Forward flexion actually was less from a voluntary standpoint with pain inhibition then extension.  She had moderately reduced lateral rotation of her neck.  She was extremely tender over the occipital notches.  She had some very mild increased tone in her cervical paraspinals, but no katerin spasm.  I could not auscultate cervical bruits.      IMPRESSION:     1.  Chronic pain syndrome with Tom-Danlos syndrome and cervical spondylosis.   2.  Mild cerebellar ectopia.      The patient is going to go ahead then with the special CSF flow cine study.  I have asked that she discuss this result with me next week.  She " will go ahead with her other consultation recommended.  Depending on the CSF flow study, will make further recommendations.  I encouraged her to continue physical therapy.  I really cannot think of anything else to offer her from a physical therapy standpoint and did suggest that she see Dr. Bob Porter, our physiatrist.  She is aware that there are new medications for chronic migraine available and is going to follow up with Dr. Nguyen concerning these medications.      The patient did have other questions about hemangioma involving her lumbar spine.  I showed her the images and felt that these were probably benign findings and did try to reassure her.  She did discuss with me her father having bone cancer in the past.      I hope this information is of use to you.       Sincerely,       Seth Galeano MD      I spent 35 minutes with the patient and her  today.  Over 50% of the time this involved counseling and coordination of care.      D: 2018   T: 2018   MT: ADAMS      Name:     PANCHO HENAO   MRN:      [id number]        Account:      [id number]   :      1970           Service Date: 2018      Document: [id number]

## 2018-05-18 NOTE — TELEPHONE ENCOUNTER
Returned patient's call and questions about Midodrine.  She states that she is having BPs in the 70s/40s in the morning and higher pressures 150s/100s occasionally later in the day or evening. The am dose should be taken with 20 oz of fluid 15-20 minutes before rising. Midodrine should not be taken within 5 hours of going to bed. Midodrine should not be taken if the patient will be staying in bed.  Reassured patient that if she is sitting up in a recliner and takes a nap she should be fine. She should just not be supine. She will record her BPs and time of day taken and bring them to her follow up in a couple of weeks.     Tiara Fletcher CNP

## 2018-05-18 NOTE — NURSING NOTE
Chief Complaint   Patient presents with     RECHECK     [facility]- MRI/ EMG RESULTS F/U     Jose Eason, MEIR

## 2018-05-18 NOTE — MR AVS SNAPSHOT
After Visit Summary   5/18/2018    Ada Welch    MRN: [id number]           Patient Information     Date Of Birth          1970        Visit Information        Provider Department      5/18/2018 5:00 PM Seth Galeano MD [facility] Neurology        Today's Diagnoses     Tom-Danlos syndrome    -  1    Cervicalgia        Abnormal magnetic resonance imaging of head           Follow-ups after your visit        Additional Services     PHYSIATRY REFERRAL       Your provider has referred you to: [facility]: Physical Medicine and Rehabilitation Clinic [facility] [phone]   [email or web address] Bob Porter MD    Please note these locations do not prescribe narcotics or manage chronic pain.    Please be aware that coverage of these services is subject to the terms and limitations of your health insurance plan.  Call member services at your health plan with any benefit or coverage questions.      Please bring the following to your appointment:  >>   Any x-rays, CTs or MRIs which have been performed.  Contact the facility where they were done to arrange for  prior to your scheduled appointment.    >>   List of current medications   >>   This referral request   >>   Any documents/labs given to you for this referral                  Your next 10 appointments already scheduled     May 24, 2018 10:40 AM CDT   (Arrive by 10:25 AM)   Return Visit with Faiza Martinez MD   [facility] ([facility])    [address] [place] [address]  4th Floor  [facility] [id number] [phone]            Jun 02, 2018  4:45 PM CDT   MR BRAIN W/O CONTRAST with UCMR1   [facility] ([facility])    [address] [place] [address]  1st [facility] [id number] [phone]           Take your medicines as usual, unless your doctor tells you not to. Bring a list of your current medicines to your exam (including  vitamins, minerals and over-the-counter drugs). Also bring the results of similar scans you may have had.  Please remove any body piercings and hair extensions before you arrive.  Follow your doctor s orders. If you do not, we may have to postpone your exam.  You may or may not receive IV contrast for this exam pending the discretion of the Radiologist.  You do not need to do anything special to prepare.  The MRI machine uses a strong magnet. Please wear clothes without metal (snaps, zippers). A sweatsuit works well, or we may give you a hospital gown.   **IMPORTANT** THE INSTRUCTIONS BELOW ARE ONLY FOR THOSE PATIENTS WHO HAVE BEEN PRESCRIBED SEDATION OR GENERAL ANESTHESIA DURING THEIR MRI PROCEDURE:  IF YOUR DOCTOR PRESCRIBED ORAL SEDATION (take medicine to help you relax during your exam):   You must get the medicine from your doctor (oral medication) before you arrive. Bring the medicine to the exam. Do not take it at home. You ll be told when to take it upon arriving for your exam.   Arrive one hour early. Bring someone who can take you home after the test. Your medicine will make you sleepy. After the exam, you may not drive, take a bus or take a taxi by yourself.  IF YOUR DOCTOR PRESCRIBED IV SEDATION:   Arrive one hour early. Bring someone who can take you home after the test. Your medicine will make you sleepy. After the exam, you may not drive, take a bus or take a taxi by yourself.   No eating 6 hours before your exam. You may have clear liquids up until 4 hours before your exam. (Clear liquids include water, clear tea, black coffee and fruit juice without pulp.)  IF YOUR DOCTOR PRESCRIBED ANESTHESIA (be asleep for your exam):   Arrive 1 1/2 hours early. Bring someone who can take you home after the test. You may not drive, take a bus or take a taxi by yourself.   No eating 8 hours before your exam. You may have clear liquids up until 4 hours before your exam. (Clear liquids include water, clear tea, black  coffee and fruit juice without pulp.)   You will spend four to five hours in the recovery room.  Please call the Imaging Department at your exam site with any questions.            Jun 04, 2018 11:00 AM CDT   (Arrive by 10:45 AM)   New Patient Visit with Bob Porter DO   [facility] Physical Medicine and Rehabilitation ([facility])    [address] [place] [address]  [address] [facility] [id number] [phone]            Jun 07, 2018  9:30 AM CDT   Ech Complete with UCECHCR1   [facility] Echo ([facility])    [address] [place] [id number] [phone]           1. Please bring or wear a comfortable two-piece outfit. 2. You may eat, drink and take your normal medicines. 3. For any questions that cannot be answered, please contact the ordering physician            Jun 07, 2018 10:30 AM CDT   (Arrive by 10:15 AM)   Implantable Loop Recorder with  Cv Device 1   [facility] ([facility])    [address] [place] [address]  [address] [place] [address] [phone]            Jun 07, 2018 11:00 AM CDT   (Arrive by 10:45 AM)   RETURN ARRHYTHMIA with FIDELINA Mora [facility] ([facility])    [address] [place] [address]  [address] [place] [id number] [phone]            Jun 12, 2018  2:40 PM CDT   (Arrive by 2:25 PM)   Return Visit with Stephen Xiong PA-C   [facility] ([facility])    [address] [place] [id number]   [phone]            Jul 11, 2018 11:15 AM CDT   (Arrive by 11:00 AM)   Return Visit with Wade Singh MD   [facility]rology [facility] Inst for Prostate and Urologic Cancers ([facility])    [address] [place] [id number] [phone]            Jul 23, 2018 12:15 PM CDT   (Arrive by 12:00 PM)   New Allergy  "with Vahe Rodriguez MD   [facility] ([facility])    [address]  [address]  [facility] [address] [phone]           Do not take anti-histamines or Zantac for seven day prior to your appointment.            Aug 08, 2018  4:20 PM CDT   (Arrive by 4:05 PM)   Return Visit with Juana Griffith MD   [facility] ([facility])    [address]  4th Floor  [facility] [address] [phone]              Who to contact     Please call your clinic at [phone] to:    Ask questions about your health    Make or cancel appointments    Discuss your medicines    Learn about your test results    Speak to your doctor            Additional Information About Your Visit        [employer] Information     [employer] gives you secure access to your electronic health record. If you see a primary care provider, you can also send messages to your care team and make appointments. If you have questions, please call your primary care clinic.  If you do not have a primary care provider, please call [phone] and they will assist you.      [employer] is an electronic gateway that provides easy, online access to your medical records. With [employer], you can request a clinic appointment, read your test results, renew a prescription or communicate with your care team.     To access your existing account, please contact your [facility] or call [phone] for assistance.        Care EveryWhere ID     This is your Care EveryWhere ID. This could be used by other organizations to access your [facility] medical records  [id number]        Your Vitals Were     Pulse Temperature Respirations Height Pulse Oximetry Breastfeeding?    76 97.8  F (36.6  C) (Oral) 24 1.715 m (5' 7.5\") 98% No    BMI (Body Mass Index)                   30.62 kg/m2            Blood Pressure from Last 3 " Encounters:   05/03/18 126/82   05/01/18 151/89   04/26/18 122/83    Weight from Last 3 Encounters:   05/18/18 90 kg (198 lb 6.4 oz)   05/03/18 88.1 kg (194 lb 4.8 oz)   05/01/18 87.1 kg (192 lb)              We Performed the Following     PHYSIATRY REFERRAL        Primary Care Provider Office Phone # Fax #    Ellyn Rivera [phone] [address] [address]        Equal Access to Services     [facility]: Hadii aad ku hadasho Soomaali, waaxda luqadaha, qaybta kaalmada adealanyadonnell, odette dykes . So [facility] [phone].    ATENCIÓN: Si habla español, tiene a sweeney disposición servicios gratuitos de asistencia lingüística. [facility] [phone].    We comply with applicable federal civil rights laws and [place] laws. We do not discriminate on the basis of race, color, national origin, age, disability, sex, sexual orientation, or gender identity.            Thank you!     Thank you for choosing [facility] NEUROLOGY  for your care. Our goal is always to provide you with excellent care. Hearing back from our patients is one way we can continue to improve our services. Please take a few minutes to complete the written survey that you may receive in the mail after your visit with us. Thank you!             Your Updated Medication List - Protect others around you: Learn how to safely use, store and throw away your medicines at www.disposemymeds.org.          This list is accurate as of 5/18/18 11:59 PM.  Always use your most recent med list.                   Brand Name Dispense Instructions for use Diagnosis    ADVAIR DISKUS 250-50 MCG/DOSE diskus inhaler   Generic drug:  fluticasone-salmeterol      Inhale 1 puff into the lungs 2 times daily as needed        albuterol 108 (90 Base) MCG/ACT Inhaler    PROAIR HFA/PROVENTIL HFA/VENTOLIN HFA     Inhale 2 puffs into the lungs        amitriptyline 10 MG tablet    ELAVIL    270 tablet    Take 3 tablets  (30 mg) by mouth At Bedtime    Chronic migraine without aura without status migrainosus, not intractable       buprenorphine 10 MCG/HR WK patch    BUTRANS    4 patch    Place 1 patch onto the skin every 7 days    Postlaminectomy syndrome of lumbar region       butalbital-acetaminophen-caffeine -40 MG per tablet    FIORICET/ESGIC     TAKE ONE TABLET BY MOUTH EVERY 4 HOURS AS NEEDED        cetirizine 10 MG tablet    zyrTEC     Take 10 mg by mouth daily        COMPRESSION STOCKINGS     3 each    1 each daily 20-30 mmHg Thigh Length measure and fit, color and style per patient preference. Doff n jose per need.    Orthostatic hypotension       DULoxetine 30 MG EC capsule    CYMBALTA    60 capsule    Take 1 capsule (30 mg) by mouth daily    Lumbar post-laminectomy syndrome       eletriptan 20 MG tablet    RELPAX    18 tablet    Take 1 tablet (20 mg) by mouth at onset of headache for migraine May repeat in 2 hours. Max 4 tablets/24 hours.    Chronic daily headache, Intractable migraine without aura and with status migrainosus       EPINEPHrine 0.3 MG/0.3ML injection 2-pack    EPIPEN/ADRENACLICK/or ANY BX GENERIC EQUIV     Inject 0.3 mg into the muscle        famotidine 20 MG tablet    PEPCID    90 tablet    Take 1 tablet (20 mg) by mouth At Bedtime    Hiatal hernia, Mast cell disorder       fluticasone 50 MCG/ACT spray    FLONASE    16 mL    INSTILL 2 SPRAYS INTO BOTH NOSTRILS DAILY    Chronic rhinitis, unspecified type       linaclotide 145 MCG capsule    LINZESS    30 capsule    Take 1 capsule (145 mcg) by mouth every morning (before breakfast) Last refill until GI clinic follow up.    Chronic idiopathic constipation       metoclopramide 5 MG tablet    REGLAN    60 tablet    Take 1-2 tabs po Q 6 hrs as needed for nausea        midodrine 5 MG tablet    PROAMATINE    180 tablet    Take 1 tablet (5 mg) by mouth 2 times daily Take 5mg in the morning and 5 mg 4-6 hours later. Do not take within 5 hours of laying down.     Orthostatic hypotension       MULTIVITAMIN PO      Take by mouth daily        omeprazole 20 MG CR capsule    priLOSEC    90 capsule    Take 1 capsule (20 mg) by mouth daily    Hiatal hernia       ondansetron 4 MG ODT tab    ZOFRAN-ODT    60 tablet    Take 1-2 tablets (4-8 mg) by mouth every 12 hours as needed for nausea    Migraine without status migrainosus, not intractable, unspecified migraine type       ONE DAILY MULTIPLE VITAMIN OR           PROBIOTIC DAILY PO      Take by mouth 2 times daily 2 packets daily        rizatriptan 5 MG ODT tab    MAXALT-MLT     TAKE 1-2 TABLETS (5-10 MG) BY MOUTH AT ONSET OF HEADACHE FOR MIGRAINE (MAX 30 MG IN 24 HOURS)        SUMAtriptan 6 MG/0.5ML pen injector kit    IMITREX STATDOSE    2 kit    Inject 0.5 mLs (6 mg) Subcutaneous at onset of headache for migraine (may repeat once after 2 hrs) May repeat in 1 hour. Max 12 mg/24 hours.    Chronic migraine without aura without status migrainosus, not intractable       SYNTHROID 25 MCG tablet   Generic drug:  levothyroxine      Take 25 mcg by mouth daily        tiZANidine 4 MG tablet    ZANAFLEX    90 tablet    Take 1 tablet (4 mg) by mouth 3 times daily as needed for muscle spasms    Post laminectomy syndrome       traMADol 50 MG tablet    ULTRAM    15 tablet    Take 1 tablet (50 mg) by mouth every 6 hours as needed for pain maximum 4 tablet(s) per day    Lumbar post-laminectomy syndrome       vitamin D 73306 UNIT capsule    ERGOCALCIFEROL          zonisamide 25 MG capsule    Zonegran    360 capsule    Take 125mg daily for 2 weeks, then 150mg    Occipital neuritis

## 2018-05-19 NOTE — PROGRESS NOTES
"Service Date: 2018      Ellyn Rivera MD   [address]   [place] [address]      RE: Ada Welch   MRN: [id number]   : 1970      Dear Dr. Rivera:      This is in regard to followup on Ada Welch.  The patient returned today with worsening neck and head discomfort.      The patient said that this last  one of her children was playing with a large beach ball.  It ended up going on a roof.  The patient was seated and it did strike either her head or her neck as it fell off.  Her , Flakita, estimated it was about a 6 feet drop.  She said this was a glancing type of blow and probably it was just a mild strike.  With it, though, she did not flinch her neck and she has had continued neck pain since.  She said it was extremely severe for the first 2 days and Wednesday she was better.  Then she started to struggle with her blood pressure.  She said it was as low as 60/40, but then later in the day it would go higher.  She did discuss this with her cardiology CNP, La Fletcher, and she was told to continue on midodrine for at least 2 more weeks.  She is going to have followup with Dr. Russell.  She had been referred to Dr. Rip Isbell regarding her Tom-Danlos syndrome and dysautonomia at [facility] but has not gone ahead with the consult pending whether I thought it was appropriate or not.  She and her  did review with me her pain physical therapy progress note.  She did see that provider this week.  The patient did not get any hands on therapy evidently that day from her, but has continued to get physical therapy including myofascial release through [facility].  She feels that has been helpful and she feels that the breathing exercises are beneficial too.  She described what she felt was \"subluxations\" causing \"CSF blockage.\"  She did go over these concerns with me.  I did review with her and her  previous records of Dr. Moncada, her " neurosurgeon, and in particular did review her findings regarding the MRI scans and CT scans that were done in the past.  I also reviewed with her her last MRI scan of the brain and she does have a mild degree of cerebellar ectopia.  The patient had been made aware of major concerns [place] I saw her last year.  The patient has never had a CSF flow study.  I did discuss this with her and her  and she did consent now to having an MRI scan with a cine study to evaluate CSF flow.  I think this would help settle whether she has any significant CSF blockage.  I did discuss with her her blood pressure again and the need to have careful followup with Dr. Russell.  She decided that she would go ahead with consultation with Dr. Isbell at [facility].      Neurologic examination today showed her blood pressure was 110/70 with a pulse of 66.  She had a regular without gallops or murmurs.  She had reduced range of motion of her neck in all directions including forward flexion.  Forward flexion actually was less from a voluntary standpoint with pain inhibition then extension.  She had moderately reduced lateral rotation of her neck.  She was extremely tender over the occipital notches.  She had some very mild increased tone in her cervical paraspinals, but no katerin spasm.  I could not auscultate cervical bruits.      IMPRESSION:     1.  Chronic pain syndrome with Tom-Danlos syndrome and cervical spondylosis.   2.  Mild cerebellar ectopia.      The patient is going to go ahead then with the special CSF flow cine study.  I have asked that she discuss this result with me next week.  She will go ahead with her other consultation recommended.  Depending on the CSF flow study, will make further recommendations.  I encouraged her to continue physical therapy.  I really cannot think of anything else to offer her from a physical therapy standpoint and did suggest that  she see Dr. Bob Porter, our physiatrist.  She is aware that there are new medications for chronic migraine available and is going to follow up with Dr. Nguyen concerning these medications.      The patient did have other questions about hemangioma involving her lumbar spine.  I showed her the images and felt that these were probably benign findings and did try to reassure her.  She did discuss with me her father having bone cancer in the past.      I hope this information is of use to you.       Sincerely,       Bernadette Galeano MD      I spent 35 minutes with the patient and her  today.  Over 50% of the time this involved counseling and coordination of care.         BERNADETTE GALEANO MD             D: 2018   T: 2018   MT: ADAMS      Name:     PANCHO HENAO   MRN:      [id number]        Account:      [id number]   :      1970           Service Date: 2018      Document: [id number]

## 2018-05-21 ENCOUNTER — TELEPHONE (OUTPATIENT)
Dept: ANESTHESIOLOGY | Facility: CLINIC | Age: 48
End: 2018-05-21

## 2018-05-21 ENCOUNTER — TELEPHONE (OUTPATIENT)
Dept: CALL CENTER | Age: 48
End: 2018-05-21

## 2018-05-21 ASSESSMENT — ANXIETY QUESTIONNAIRES
5. BEING SO RESTLESS THAT IT IS HARD TO SIT STILL: NOT AT ALL
3. WORRYING TOO MUCH ABOUT DIFFERENT THINGS: NOT AT ALL
7. FEELING AFRAID AS IF SOMETHING AWFUL MIGHT HAPPEN: NOT AT ALL
2. NOT BEING ABLE TO STOP OR CONTROL WORRYING: NOT AT ALL
GAD7 TOTAL SCORE: 2
4. TROUBLE RELAXING: NOT AT ALL
6. BECOMING EASILY ANNOYED OR IRRITABLE: SEVERAL DAYS
7. FEELING AFRAID AS IF SOMETHING AWFUL MIGHT HAPPEN: NOT AT ALL
1. FEELING NERVOUS, ANXIOUS, OR ON EDGE: SEVERAL DAYS

## 2018-05-21 NOTE — TELEPHONE ENCOUNTER
[facility] Call Center    Phone Message    May a detailed message be left on voicemail: no    Reason for Call: Other: Pt of Dr. Martinez calling, she is having her pain flare up.  I scheduled her with Dr. Martinez on 05/31/18 and pt started crying wanting to speak with Jessica because she doesnt know what to do from now til appt.  Please give pt a call back     Action Taken: Message routed to:  [facility] ([facility] Pain

## 2018-05-21 NOTE — TELEPHONE ENCOUNTER
"[facility]: Nurse Triage Note  SITUATION/BACKGROUND                                                      Ada Welch is a 47 year old female who calls with persistent low BP's, Tiara Fletcher in Cardiology helping her with this last week.  Issue not resolving.    Description:  BP's 82/44 pulse 76 now 60's/40's earlier today.  Later today expect 135/110.    Onset/duration:  Last Monday 66/40's 130's/110 later in the day.  Has been on Midodrine for 3 days to try to keep her BP up.  Not helping. \"Working the opposite.\"  Precip. factors:  Nothing specific she can think of.   Associated symptoms:  She states in addition Saturday was \"literally tapped on the back on my head with a beach ball.\"  Seems like dysnomia worse too.  Has sublux vertebrae. Denies \"getting smacked\".  Improves/worsens symptoms:  Nothing helping now.  In the past increasing fluids, propel and salt help.  Usually rebounds within a couple of hours.  Has been going on since last Monday. She's concerned something else is going on.  Reviewed 5/17 note advise and she states she is doing those things.  Pain scale (0-10)      MEDICATIONS:   Taking medication(s) as prescribed? Yes  Taking over the counter medication(s?) N/A  Any barriers to taking medication(s) as prescribed?  N/A  Medication(s) improving/managing symptoms?  No    Allergies:   Allergies   Allergen Reactions     Bee Venom Anaphylaxis, Difficulty breathing, Palpitations, Shortness Of Breath and Visual Disturbance     Patient does carry Epi-Pen     No Clinical Screening - See Comments Anaphylaxis     Chicken-Derived Products (Egg) Diarrhea and Nausea     Patient will self monitor  Other reaction(s): Nausea  Patient will self monitor  Other reaction(s): GI intolerance     Gabapentin      Gluten Meal      Lactose Dermatitis and Diarrhea     Milk Digestant Nausea     Patient is aware of milk sensitivity, will manage own diet     Milk Protein Extract      Other " reaction(s): GI intolerance\  EGGS      Topiramate      Wheat Diarrhea     Wheat Bran Nausea     Patient will self monitor  Other reaction(s): Nausea  Patient will self monitor       ASSESSMENT    Since not new symptoms she does not feel she needs ED evaluation or to seek medical care at this time.  States she knows how to deal with it.  Requesting further advice, since what she's been trying is not helping, and it's lasting longer than usual    Needs further advice from Cardiology.    RECOMMENDATION/PLAN                                                      RECOMMENDED DISPOSITION:  Will alert Cardiology team and request urgent call back.    Will comply with recommendation: Yes  Call back if doesn't hear from her team.    If further questions/concerns or if symptoms do not improve, worsen or new symptoms develop, call your PCP or [phone] to talk with the Resident on call, as soon as possible.    Guideline used: [employer] page 353  Telephone Triage Protocols for Nurses, Fifth Edition, Carla Triana RN

## 2018-05-21 NOTE — TELEPHONE ENCOUNTER
"Spoke with pt.   Stated she felt the midodrine was not helping. Stated she has taken it as instructed and it is making her blood pressures lower.  Pt stated \"I do not react to medications the way most people do- Im usually an anomaly\"   Pt stated she was thinking the low and high blood pressures are related to sublux of neck when she was hit by a beach ball.. See neuro consult.     Pt stated something must be happening. Her blood pressures are low nad then high. It is worse in am.   Most recent blood pressure 91/52- 121. Asked if her heart rates are always fast- pt stated \"no, many times is slow when her blood pressure is low, but not necessarily\"     Pt stated she is unable to sleep, very tired. Will call and schd appt with EDS/dyautonomia specialist at [facility] (neurology) as recommended last week.    Will route message to Tiara Fletcher and see if she has another other suggestions.     Pt did confirm she is drinking 1/2 water, 1/2 electrolyte fluids  oz per day. Drinks at least 20 ounces before getting out of bed with midodrine and no improvement.       "

## 2018-05-22 ASSESSMENT — ANXIETY QUESTIONNAIRES: GAD7 TOTAL SCORE: 2

## 2018-05-22 NOTE — TELEPHONE ENCOUNTER
Discussed with Tiara Fletcher NP:    Encourage pt to use midodrine as ordered until follow up visit.   Alternate option is to dc midodrine and try fludrocortisone 0.1 mg daily.     Will contact pt

## 2018-05-22 NOTE — TELEPHONE ENCOUNTER
Call back to pt. Pt advised to try taking 1000mg of tylenol along with 50mg of tramadol to increase analgesic effects. Pt reported having no liver concerns.  Pt advised to limit tylenol to 4000mg in a 24 hour period.  Dr. Martinez updated. Pt assisted with scheduling sooner appointment with Dr. Martinez 5/24/18.      Jessica De Souza, RN, BSN

## 2018-05-23 ENCOUNTER — CARE COORDINATION (OUTPATIENT)
Dept: NEUROLOGY | Facility: CLINIC | Age: 48
End: 2018-05-23

## 2018-05-23 DIAGNOSIS — I95.1 DYSAUTONOMIA ORTHOSTATIC HYPOTENSION SYNDROME: ICD-10-CM

## 2018-05-23 DIAGNOSIS — Q79.60 EHLERS-DANLOS SYNDROME: Primary | ICD-10-CM

## 2018-05-23 NOTE — PROGRESS NOTES
Faxed over the Referral for patient to see Dr. Isbell at [facility] (Neurology), Fax # is [phone]. At 1039 on 5/23/2018

## 2018-05-24 ENCOUNTER — CARE COORDINATION (OUTPATIENT)
Dept: NEUROLOGY | Facility: CLINIC | Age: 48
End: 2018-05-24

## 2018-05-24 ENCOUNTER — OFFICE VISIT (OUTPATIENT)
Dept: ANESTHESIOLOGY | Facility: CLINIC | Age: 48
End: 2018-05-24
Payer: MEDICARE

## 2018-05-24 ENCOUNTER — CARE COORDINATION (OUTPATIENT)
Dept: CARDIOLOGY | Facility: CLINIC | Age: 48
End: 2018-05-24

## 2018-05-24 VITALS — DIASTOLIC BLOOD PRESSURE: 70 MMHG | OXYGEN SATURATION: 99 % | HEART RATE: 95 BPM | SYSTOLIC BLOOD PRESSURE: 105 MMHG

## 2018-05-24 DIAGNOSIS — G90.9 AUTONOMIC DYSFUNCTION: Primary | ICD-10-CM

## 2018-05-24 DIAGNOSIS — M96.1 LUMBAR POST-LAMINECTOMY SYNDROME: ICD-10-CM

## 2018-05-24 DIAGNOSIS — G90.9 AUTONOMIC DYSFUNCTION: ICD-10-CM

## 2018-05-24 DIAGNOSIS — M96.1 POSTLAMINECTOMY SYNDROME: Primary | ICD-10-CM

## 2018-05-24 LAB
COLLECT DURATION TIME UR: 24 H
SPECIMEN VOL UR: 1040 ML

## 2018-05-24 RX ORDER — TRAMADOL HYDROCHLORIDE 50 MG/1
50 TABLET ORAL EVERY 6 HOURS PRN
Qty: 15 TABLET | Refills: 1 | Status: SHIPPED | OUTPATIENT
Start: 2018-05-24 | End: 2018-07-07

## 2018-05-24 RX ORDER — OXYCODONE HYDROCHLORIDE 5 MG/1
2.5 TABLET ORAL EVERY 6 HOURS PRN
Qty: 15 TABLET | Refills: 0 | Status: SHIPPED | OUTPATIENT
Start: 2018-05-24 | End: 2018-06-05

## 2018-05-24 RX ORDER — FLUDROCORTISONE ACETATE 0.1 MG/1
0.1 TABLET ORAL DAILY
Qty: 30 TABLET | Refills: 3 | Status: SHIPPED | OUTPATIENT
Start: 2018-05-24 | End: 2018-06-07

## 2018-05-24 NOTE — PROGRESS NOTES
[facility] : Progress Note    Date of visit: 5/24/2018    Interval history:  Ada Welch is a 47 year old female with past medical history significant for Ehler Danlos syndrome is known to me for chronic  low back pain, which is mostly located at lower lumbar area. The patient has very complex history of low back pain for nearly 24 years for which she underwent L4-S1 fusion (TLIF) 12/09/2013.  Postoperatively her  back pain even became much worse than preop.  Mainly LBP, but also radiates down both legs S1 dermatome distribution.   She then had  removal of posterior instrumentation (screws and guadalupe) with exploration of fusion.  Fusion noted to already be solid.  NO significant relief afforded by this surgery. The pain has been progressively worsened, limiting the patient s activities, affecting mood and sleep quality, and causing a decrease in the patient s quality of life.  She underwent multiple SUNSHINE's which were not helpful, and even made things worse.  Recently started aquatic based physical therapy. Previous EMG showed some L5 radiculopathy on left. Her pain significantly improved after we started her butrans patch. She continues to be more functional .       Today for a follow-up visit.  She continues to have headache, neck pain, back pain and bilateral lower extremity pain.  She reports that 2 weeks ago, her children was playing with a large beach ball, which accidentally strike her head.  She did not lose consciousness and no imaging studies was performed.  She said that her headache pain and neck pain or extremely severe the first 2 days after the incident.  She needed to take extra dose of tramadol and Tylenol together..  She has seen her neurologist Dr. Galeano who ordered a brain MRI.  Meanwhile she reports that her pain currently poorly controlled with tramadol, she is takes only as a last resort.  She also has been taking Butrans  patch 10 mics per hour.  Butrans dose has not been increased for a long time.  In addition she has been taking amitriptyline, tizanidine and Cymbalta.    [place] Prescription Monitoring Program:   Reviewed. No concerns    Review of Systems:  The 14 system ROS was reviewed and was negative except what is documented above and as follows.  Any bowel or bladder problems: none  Mood: okay    Physical Exam:  Vitals:    05/24/18 1215   BP: 105/70   Pulse: 95   SpO2: 99%       General: Awake in no apparent distress.   Eyes: Sclerae are anicteric. PERRLA, EOMI   Neck: Cervical collar in place  Lungs: unlabored.   Heart: regular rate and rhythm   Abdomen: soft non tender.  Extremities: Pulses are well palpable, no peripheral edema.   Musculoskeletal: 5/5 muscle strength in all extremities.   Neurologic exam:Sensation intact throughout all dermatomes bilateral upper extremities and lower extremities  Psychiatric; Normal affect.   Skin: Warm and Dry.    Medications:  Current Outpatient Prescriptions   Medication Sig Dispense Refill     oxyCODONE IR (ROXICODONE) 5 MG tablet Take 0.5 tablets (2.5 mg) by mouth every 6 hours as needed for severe pain 15 tablet 0     traMADol (ULTRAM) 50 MG tablet Take 1 tablet (50 mg) by mouth every 6 hours as needed for pain maximum 4 tablet(s) per day 15 tablet 1     albuterol (PROAIR HFA/PROVENTIL HFA/VENTOLIN HFA) 108 (90 BASE) MCG/ACT Inhaler Inhale 2 puffs into the lungs       amitriptyline (ELAVIL) 10 MG tablet Take 3 tablets (30 mg) by mouth At Bedtime 270 tablet 3     buprenorphine (BUTRANS) 10 MCG/HR WK patch Place 1 patch onto the skin every 7 days 4 patch 2     butalbital-acetaminophen-caffeine (FIORICET/ESGIC) -40 MG per tablet TAKE ONE TABLET BY MOUTH EVERY 4 HOURS AS NEEDED  0     cetirizine (ZYRTEC) 10 MG tablet Take 10 mg by mouth daily        COMPRESSION STOCKINGS 1 each daily 20-30 mmHg Thigh Length measure and fit, color and style per patient preference. Antolin garcia  per need. 3 each 3     DULoxetine (CYMBALTA) 30 MG EC capsule Take 1 capsule (30 mg) by mouth daily 60 capsule 1     eletriptan (RELPAX) 20 MG tablet Take 1 tablet (20 mg) by mouth at onset of headache for migraine May repeat in 2 hours. Max 4 tablets/24 hours. 18 tablet 1     EPINEPHrine (EPIPEN/ADRENACLICK/OR ANY BX GENERIC EQUIV) 0.3 MG/0.3ML injection 2-pack Inject 0.3 mg into the muscle       famotidine (PEPCID) 20 MG tablet Take 1 tablet (20 mg) by mouth At Bedtime 90 tablet 1     fluticasone (FLONASE) 50 MCG/ACT spray INSTILL 2 SPRAYS INTO BOTH NOSTRILS DAILY 16 mL 3     fluticasone-salmeterol (ADVAIR DISKUS) 250-50 MCG/DOSE diskus inhaler Inhale 1 puff into the lungs 2 times daily as needed        levothyroxine (SYNTHROID) 25 MCG tablet Take 25 mcg by mouth daily       linaclotide (LINZESS) 145 MCG capsule Take 1 capsule (145 mcg) by mouth every morning (before breakfast) Last refill until GI clinic follow up. 30 capsule 0     metoclopramide (REGLAN) 5 MG tablet Take 1-2 tabs po Q 6 hrs as needed for nausea 60 tablet 3     midodrine (PROAMATINE) 5 MG tablet Take 1 tablet (5 mg) by mouth 2 times daily Take 5mg in the morning and 5 mg 4-6 hours later. Do not take within 5 hours of laying down. 180 tablet 3     Multiple Vitamins-Minerals (MULTIVITAMIN PO) Take by mouth daily       omeprazole (PRILOSEC) 20 MG CR capsule Take 1 capsule (20 mg) by mouth daily 90 capsule 2     ondansetron (ZOFRAN-ODT) 4 MG ODT tab Take 1-2 tablets (4-8 mg) by mouth every 12 hours as needed for nausea 60 tablet 1     ONE DAILY MULTIPLE VITAMIN OR        Probiotic Product (PROBIOTIC DAILY PO) Take by mouth 2 times daily 2 packets daily       rizatriptan (MAXALT-MLT) 5 MG ODT tab TAKE 1-2 TABLETS (5-10 MG) BY MOUTH AT ONSET OF HEADACHE FOR MIGRAINE (MAX 30 MG IN 24 HOURS)  6     SUMAtriptan (IMITREX STATDOSE) 6 MG/0.5ML pen injector kit Inject 0.5 mLs (6 mg) Subcutaneous at onset of headache for migraine (may repeat once after 2 hrs)  "May repeat in 1 hour. Max 12 mg/24 hours. 2 kit 1     tiZANidine (ZANAFLEX) 4 MG tablet Take 1 tablet (4 mg) by mouth 3 times daily as needed for muscle spasms 90 tablet 1     vitamin D (ERGOCALCIFEROL) 02351 UNIT capsule        zonisamide (ZONEGRAN) 25 MG capsule Take 125mg daily for 2 weeks, then 150mg 360 capsule 3         LABORATORY VALUES:   Recent Labs   Lab Test  03/02/18   1340  10/20/17   1150   NA  138  138   POTASSIUM  3.8  3.7   CHLORIDE  105  107   CO2  24  23   ANIONGAP  9  8   GLC  89  108*   BUN  9  10   CR  0.88  0.78   JUAN  8.9  8.4*       CBC RESULTS:   Recent Labs   Lab Test  10/20/17   1150   WBC  6.1   RBC  4.29   HGB  13.4   HCT  38.8   MCV  90   MCH  31.2   MCHC  34.5   RDW  12.5   PLT  216       Most Recent 3 INR's:  Recent Labs   Lab Test  07/25/17   1249   INR  1.01           ASSESSMENT:    Chronic low back pain radiating to bilateral lower extremity  Persistent headache  Chronic neck pain  Chronic opioid therapy  Clinical diagnosis of Tom-Danlos syndrome  Chronic UTI  EMG finding of lower extremity radiculopathy  Bilateral lumbar facet arthritis  Anxiety disorder  Chronic pain syndrome  Post laminectomy pain syndrome        PLAN:     -  Interventions: None at this time. SUNSHINE's and SCS  options have been discussed with her but this would be a last resort as she feels her body \"rejects foreign objects\".   -  Medications:  getting excellent benefit with butrans at this time and is on a stable dose.    -  Continue tizanidine 8mg to help with her muscle spasms.  Continue naproxen and ACETAMINOPHEN prn.  - Take Amitriptyline 30 mg at bedtime.  - Take Butrans patch 10 mics per hour.  Change patch every 7 days.  Discussed about increasing the patch to 15 mics per hour.  -Continue to take tramadol 50 mg every 6 hours as needed.  15 tablets dispensed today.  -Start taking Cymbalta 30 mg daily.  The dose may be increased to 60 mg nightly and 30 mg in the morning.  -start taking oxycodone 2.5 mg " daily as needed.  15 tablets dispensed today.  - Physical Therapy:  Continue as scheduled - she also does aquatic therapy - she will continue her HEP  - Psychology/Coping skills: continue therapy.   - Diagnostic Studies:  Will follow-up her brain MRI ordered by Dr. Galeano.      Follow up: 2 months          Assessment will be ongoing with changes in treatment as indicated.  Benefits/risks/alternatives to treatment have been reviewed and the patient has been instructed to contact this office if they have any questions or concerns.  This plan of care has been discussed with the patient and the patient is in agreement.      Faiza Martinez MD, PHD

## 2018-05-24 NOTE — PROGRESS NOTES
Faxed today ([phone] on 5/24/2018) Office Notes from Dr. Gaelano and the referral reason of Dysautonomia to Dr. Isbell's office per his Nurse's request. Fax #[phone]

## 2018-05-24 NOTE — MR AVS SNAPSHOT
After Visit Summary   5/24/2018    Ada Welch    MRN: [id number]           Patient Information     Date Of Birth          1970        Visit Information        Provider Department      5/24/2018 10:40 AM Faiza Martinez MD [facility]        Today's Diagnoses     Postlaminectomy syndrome    -  1    Lumbar post-laminectomy syndrome          Care Instructions    1. Continue taking tramadol as prescribed.      2. Start taking oxycodone 2.5mg as prescribed for severe pain not responsive to tramadol.     Follow up: 2 months       To speak with a nurse, schedule/reschedule/cancel a clinic appointment, or request a medication refill call: [phone]     You can also reach us by PowerPot: [email or web address]    For refills, please call on Monday, 1 week before your medication runs out. The doctors are not always in clinic, so this gives us time to get your prescriptions ready.  Please let us know the name of the medication you are requesting a refill of.                                     Follow-ups after your visit        Your next 10 appointments already scheduled     Jun 02, 2018  4:45 PM CDT   MR BRAIN W/O CONTRAST with [address] [facility] ([facility])    [address] [address] [phone]           Take your medicines as usual, unless your doctor tells you not to. Bring a list of your current medicines to your exam (including vitamins, minerals and over-the-counter drugs). Also bring the results of similar scans you may have had.  Please remove any body piercings and hair extensions before you arrive.  Follow your doctor s orders. If you do not, we may have to postpone your exam.  You may or may not receive IV contrast for this exam pending the discretion of the Radiologist.  You do not need to do anything special to prepare.  The MRI machine uses a strong  magnet. Please wear clothes without metal (snaps, zippers). A sweatsuit works well, or we may give you a hospital gown.   **IMPORTANT** THE INSTRUCTIONS BELOW ARE ONLY FOR THOSE PATIENTS WHO HAVE BEEN PRESCRIBED SEDATION OR GENERAL ANESTHESIA DURING THEIR MRI PROCEDURE:  IF YOUR DOCTOR PRESCRIBED ORAL SEDATION (take medicine to help you relax during your exam):   You must get the medicine from your doctor (oral medication) before you arrive. Bring the medicine to the exam. Do not take it at home. You ll be told when to take it upon arriving for your exam.   Arrive one hour early. Bring someone who can take you home after the test. Your medicine will make you sleepy. After the exam, you may not drive, take a bus or take a taxi by yourself.  IF YOUR DOCTOR PRESCRIBED IV SEDATION:   Arrive one hour early. Bring someone who can take you home after the test. Your medicine will make you sleepy. After the exam, you may not drive, take a bus or take a taxi by yourself.   No eating 6 hours before your exam. You may have clear liquids up until 4 hours before your exam. (Clear liquids include water, clear tea, black coffee and fruit juice without pulp.)  IF YOUR DOCTOR PRESCRIBED ANESTHESIA (be asleep for your exam):   Arrive 1 1/2 hours early. Bring someone who can take you home after the test. You may not drive, take a bus or take a taxi by yourself.   No eating 8 hours before your exam. You may have clear liquids up until 4 hours before your exam. (Clear liquids include water, clear tea, black coffee and fruit juice without pulp.)   You will spend four to five hours in the recovery room.  Please call the Imaging Department at your exam site with any questions.            Jun 04, 2018 11:00 AM CDT   (Arrive by 10:45 AM)   New Patient Visit with Bob Porter DO   [facility] ([facility])    [address] [place] [address] [phone]             Jun 07, 2018  9:30 AM CDT   Ech Complete with [id number]   [facility] Echo ([facility])    [address] [facility]  3rd Floor  [facility] [id number] [phone]           1. Please bring or wear a comfortable two-piece outfit. 2. You may eat, drink and take your normal medicines. 3. For any questions that cannot be answered, please contact the ordering physician            Jun 07, 2018 10:30 AM CDT   (Arrive by 10:15 AM)   Implantable Loop Recorder with  Cv Device 1   [facility] ([facility])    [address] [place] [address]  [address] [place] [id number] [phone]            Jun 07, 2018 11:00 AM CDT   (Arrive by 10:45 AM)   RETURN ARRHYTHMIA with FIDELINA [facility] ([facility])    [address] [place] [address]  [address] [place] [id number] [phone]            Jun 12, 2018  2:40 PM CDT   (Arrive by 2:25 PM)   Return Visit with Stephen Xiong PA-C   [facility] ([facility])    [address] [place] [address]  [address] [facility] [id number] [phone]            Jul 11, 2018 11:15 AM CDT   (Arrive by 11:00 AM)   Return Visit with Wade Singh MD   [facility] Prostate [facility] Urologic Cancers ([facility])    [address] [place] [address]  [address] [facility] [id number] [phone]            Jul 23, 2018 12:15 PM CDT   (Arrive by 12:00 PM)   New Allergy with Vahe Rodriguez MD   [facility] ([facility])    [address] [place] [address]  [address] [place] [id number] [phone]           Do not take anti-histamines or Zantac for seven day prior to your appointment.            Aug 08, 2018  4:20 PM CDT   (Arrive by 4:05 PM)   Return Visit with Juana Griffith MD   [facility] ([facility]  [facility])    [address] [address] [phone]            Oct 25, 2018  9:25 AM CDT   (Arrive by 9:10 AM)   Return Visit with Ellyn Rivera MD   [facility] ([facility])    [address] [place] [id number] [phone]              Who to contact     Please call your clinic at [phone] to:    Ask questions about your health    Make or cancel appointments    Discuss your medicines    Learn about your test results    Speak to your doctor            Additional Information About Your Visit        Virtusize Information     Virtusize gives you secure access to your electronic health record. If you see a primary care provider, you can also send messages to your care team and make appointments. If you have questions, please call your primary care clinic.  If you do not have a primary care provider, please call [phone] and they will assist you.      Virtusize is an electronic gateway that provides easy, online access to your medical records. With Virtusize, you can request a clinic appointment, read your test results, renew a prescription or communicate with your care team.     To access your existing account, please contact your [facility] or call [phone] for assistance.        Care EveryWhere ID     This is your Care EveryWhere ID. This could be used by other organizations to access your [facility] medical records  [id number]         Blood Pressure from Last 3 Encounters:   05/03/18 126/82   05/01/18 151/89   04/26/18 122/83    Weight from Last 3 Encounters:   05/18/18 90 kg (198 lb 6.4 oz)   05/03/18 88.1 kg (194 lb 4.8 oz)   05/01/18 87.1 kg (192 lb)              Today, you had the following     No orders found for display         Today's Medication Changes          These changes are accurate as of 5/24/18 11:58 AM.  If you have any questions, ask your  nurse or doctor.               Start taking these medicines.        Dose/Directions    oxyCODONE IR 5 MG tablet   Commonly known as:  ROXICODONE   Used for:  Postlaminectomy syndrome   Started by:  Faiza Martinez MD        Dose:  2.5 mg   Take 0.5 tablets (2.5 mg) by mouth every 6 hours as needed for severe pain   Quantity:  15 tablet   Refills:  0            Where to get your medicines      Some of these will need a paper prescription and others can be bought over the counter.  Ask your nurse if you have questions.     Bring a paper prescription for each of these medications     oxyCODONE IR 5 MG tablet    traMADol 50 MG tablet               Information about OPIOIDS     PRESCRIPTION OPIOIDS: WHAT YOU NEED TO KNOW   You have a prescription for an opioid (narcotic) pain medicine. Opioids can cause addiction. If you have a history of chemical dependency of any type, you are at a higher risk of becoming addicted to opioids. Only take this medicine after all other options have been tried. Take it for as short a time and as few doses as possible.     Do not:    Drive. If you drive while taking these medicines, you could be arrested for driving under the influence (DUI).    Operate heavy machinery    Do any other dangerous activities while taking these medicines.     Drink any alcohol while taking these medicines.      Take with any other medicines that contain acetaminophen. Read all labels carefully. Look for the word  acetaminophen  or  Tylenol.  Ask your pharmacist if you have questions or are unsure.    Store your pills in a secure place, locked if possible. We will not replace any lost or stolen medicine. If you don t finish your medicine, please throw away (dispose) as directed by your pharmacist. The [place] Pollution Control Agency has more information about safe disposal: https://www.pca.[facility].[place].us/living-green/managing-unwanted-medications    All opioids tend to cause constipation. Drink plenty of water  and eat foods that have a lot of fiber, such as fruits, vegetables, prune juice, apple juice and high-fiber cereal. Take a laxative (Miralax, milk of magnesia, Colace, Senna) if you don t move your bowels at least every other day.          Primary Care Provider Office Phone # Fax #    Ellyn Rivera [phone] [address] [address]        Equal Access to Services     CHRISTINA GUY : Hadii aad ku hadasho Soomaali, waaxda luqadaha, qaybta kaalmada adeegyada, waxay idiin hayaan adealan kharagrant tang. So [facility] [phone].    ATENCIÓN: Si habla español, tiene a sweeney disposición servicios gratuitos de asistencia lingüística. [facility] [phone].    We comply with applicable federal civil rights laws and [place] laws. We do not discriminate on the basis of race, color, national origin, age, disability, sex, sexual orientation, or gender identity.            Thank you!     Thank you for choosing [facility]  for your care. Our goal is always to provide you with excellent care. Hearing back from our patients is one way we can continue to improve our services. Please take a few minutes to complete the written survey that you may receive in the mail after your visit with us. Thank you!             Your Updated Medication List - Protect others around you: Learn how to safely use, store and throw away your medicines at www.disposemymeds.org.          This list is accurate as of 5/24/18 11:58 AM.  Always use your most recent med list.                   Brand Name Dispense Instructions for use Diagnosis    ADVAIR DISKUS 250-50 MCG/DOSE diskus inhaler   Generic drug:  fluticasone-salmeterol      Inhale 1 puff into the lungs 2 times daily as needed        albuterol 108 (90 Base) MCG/ACT Inhaler    PROAIR HFA/PROVENTIL HFA/VENTOLIN HFA     Inhale 2 puffs into the lungs        amitriptyline 10 MG tablet    ELAVIL    270 tablet    Take 3  tablets (30 mg) by mouth At Bedtime    Chronic migraine without aura without status migrainosus, not intractable       buprenorphine 10 MCG/HR WK patch    BUTRANS    4 patch    Place 1 patch onto the skin every 7 days    Postlaminectomy syndrome of lumbar region       butalbital-acetaminophen-caffeine -40 MG per tablet    FIORICET/ESGIC     TAKE ONE TABLET BY MOUTH EVERY 4 HOURS AS NEEDED        cetirizine 10 MG tablet    zyrTEC     Take 10 mg by mouth daily        COMPRESSION STOCKINGS     3 each    1 each daily 20-30 mmHg Thigh Length measure and fit, color and style per patient preference. Doshar n jose per need.    Orthostatic hypotension       DULoxetine 30 MG EC capsule    CYMBALTA    60 capsule    Take 1 capsule (30 mg) by mouth daily    Lumbar post-laminectomy syndrome       eletriptan 20 MG tablet    RELPAX    18 tablet    Take 1 tablet (20 mg) by mouth at onset of headache for migraine May repeat in 2 hours. Max 4 tablets/24 hours.    Chronic daily headache, Intractable migraine without aura and with status migrainosus       EPINEPHrine 0.3 MG/0.3ML injection 2-pack    EPIPEN/ADRENACLICK/or ANY BX GENERIC EQUIV     Inject 0.3 mg into the muscle        famotidine 20 MG tablet    PEPCID    90 tablet    Take 1 tablet (20 mg) by mouth At Bedtime    Hiatal hernia, Mast cell disorder       fluticasone 50 MCG/ACT spray    FLONASE    16 mL    INSTILL 2 SPRAYS INTO BOTH NOSTRILS DAILY    Chronic rhinitis, unspecified type       linaclotide 145 MCG capsule    LINZESS    30 capsule    Take 1 capsule (145 mcg) by mouth every morning (before breakfast) Last refill until GI clinic follow up.    Chronic idiopathic constipation       metoclopramide 5 MG tablet    REGLAN    60 tablet    Take 1-2 tabs po Q 6 hrs as needed for nausea        midodrine 5 MG tablet    PROAMATINE    180 tablet    Take 1 tablet (5 mg) by mouth 2 times daily Take 5mg in the morning and 5 mg 4-6 hours later. Do not take within 5 hours of  laying down.    Orthostatic hypotension       MULTIVITAMIN PO      Take by mouth daily        omeprazole 20 MG CR capsule    priLOSEC    90 capsule    Take 1 capsule (20 mg) by mouth daily    Hiatal hernia       ondansetron 4 MG ODT tab    ZOFRAN-ODT    60 tablet    Take 1-2 tablets (4-8 mg) by mouth every 12 hours as needed for nausea    Migraine without status migrainosus, not intractable, unspecified migraine type       ONE DAILY MULTIPLE VITAMIN OR           oxyCODONE IR 5 MG tablet    ROXICODONE    15 tablet    Take 0.5 tablets (2.5 mg) by mouth every 6 hours as needed for severe pain    Postlaminectomy syndrome       PROBIOTIC DAILY PO      Take by mouth 2 times daily 2 packets daily        rizatriptan 5 MG ODT tab    MAXALT-MLT     TAKE 1-2 TABLETS (5-10 MG) BY MOUTH AT ONSET OF HEADACHE FOR MIGRAINE (MAX 30 MG IN 24 HOURS)        SUMAtriptan 6 MG/0.5ML pen injector kit    IMITREX STATDOSE    2 kit    Inject 0.5 mLs (6 mg) Subcutaneous at onset of headache for migraine (may repeat once after 2 hrs) May repeat in 1 hour. Max 12 mg/24 hours.    Chronic migraine without aura without status migrainosus, not intractable       SYNTHROID 25 MCG tablet   Generic drug:  levothyroxine      Take 25 mcg by mouth daily        tiZANidine 4 MG tablet    ZANAFLEX    90 tablet    Take 1 tablet (4 mg) by mouth 3 times daily as needed for muscle spasms    Post laminectomy syndrome       traMADol 50 MG tablet    ULTRAM    15 tablet    Take 1 tablet (50 mg) by mouth every 6 hours as needed for pain maximum 4 tablet(s) per day    Lumbar post-laminectomy syndrome       vitamin D 06287 UNIT capsule    ERGOCALCIFEROL          zonisamide 25 MG capsule    Zonegran    360 capsule    Take 125mg daily for 2 weeks, then 150mg    Occipital neuritis

## 2018-05-24 NOTE — PATIENT INSTRUCTIONS
1. Continue taking tramadol as prescribed.      2. Start taking oxycodone 2.5mg as prescribed for severe pain not responsive to tramadol.     Follow up: 2 months       To speak with a nurse, schedule/reschedule/cancel a clinic appointment, or request a medication refill call: [phone]     You can also reach us by [employer]: [email or web address]    For refills, please call on Monday, 1 week before your medication runs out. The doctors are not always in clinic, so this gives us time to get your prescriptions ready.  Please let us know the name of the medication you are requesting a refill of.

## 2018-05-24 NOTE — LETTER
5/24/2018       RE: Ada Wlech  [address] [place] [address]     Dear Colleague,    Thank you for referring your patient, Ada Welch, to the [facility] FOR COMPREHENSIVE PAIN MANAGEMENT at [facility]. Please see a copy of my visit note below.    [facility] for Comprehensive Chronic Pain Management : Progress Note    Date of visit: 5/24/2018    Interval history:  Ada Welch is a 47 year old female with past medical history significant for Ehler Danlos syndrome is known to me for chronic  low back pain, which is mostly located at lower lumbar area. The patient has very complex history of low back pain for nearly 24 years for which she underwent L4-S1 fusion (TLIF) 12/09/2013.  Postoperatively her  back pain even became much worse than preop.  Mainly LBP, but also radiates down both legs S1 dermatome distribution.   She then had  removal of posterior instrumentation (screws and guadalupe) with exploration of fusion.  Fusion noted to already be solid.  NO significant relief afforded by this surgery. The pain has been progressively worsened, limiting the patient s activities, affecting mood and sleep quality, and causing a decrease in the patient s quality of life.  She underwent multiple SUNSHINE's which were not helpful, and even made things worse.  Recently started aquatic based physical therapy. Previous EMG showed some L5 radiculopathy on left. Her pain significantly improved after we started her butrans patch. She continues to be more functional .       Today for a follow-up visit.  She continues to have headache, neck pain, back pain and bilateral lower extremity pain.  She reports that 2 weeks ago, her children was playing with a large beach ball, which accidentally strike her head.  She did not lose consciousness and no imaging studies was performed.  She said that her headache pain and neck pain or extremely severe the first 2  days after the incident.  She needed to take extra dose of tramadol and Tylenol together..  She has seen her neurologist Dr. Galeano who ordered a brain MRI.  Meanwhile she reports that her pain currently poorly controlled with tramadol, she is takes only as a last resort.  She also has been taking Butrans patch 10 mics per hour.  Butrans dose has not been increased for a long time.  In addition she has been taking amitriptyline, tizanidine and Cymbalta.    [place] Prescription Monitoring Program:   Reviewed. No concerns    Review of Systems:  The 14 system ROS was reviewed and was negative except what is documented above and as follows.  Any bowel or bladder problems: none  Mood: okay    Physical Exam:  Vitals:    05/24/18 1215   BP: 105/70   Pulse: 95   SpO2: 99%       General: Awake in no apparent distress.   Eyes: Sclerae are anicteric. PERRLA, EOMI   Neck: Cervical collar in place  Lungs: unlabored.   Heart: regular rate and rhythm   Abdomen: soft non tender.  Extremities: Pulses are well palpable, no peripheral edema.   Musculoskeletal: 5/5 muscle strength in all extremities.   Neurologic exam:Sensation intact throughout all dermatomes bilateral upper extremities and lower extremities  Psychiatric; Normal affect.   Skin: Warm and Dry.    Medications:  Current Outpatient Prescriptions   Medication Sig Dispense Refill     oxyCODONE IR (ROXICODONE) 5 MG tablet Take 0.5 tablets (2.5 mg) by mouth every 6 hours as needed for severe pain 15 tablet 0     traMADol (ULTRAM) 50 MG tablet Take 1 tablet (50 mg) by mouth every 6 hours as needed for pain maximum 4 tablet(s) per day 15 tablet 1     albuterol (PROAIR HFA/PROVENTIL HFA/VENTOLIN HFA) 108 (90 BASE) MCG/ACT Inhaler Inhale 2 puffs into the lungs       amitriptyline (ELAVIL) 10 MG tablet Take 3 tablets (30 mg) by mouth At Bedtime 270 tablet 3     buprenorphine (BUTRANS) 10 MCG/HR WK patch Place 1 patch onto the skin every 7 days 4 patch 2      butalbital-acetaminophen-caffeine (FIORICET/ESGIC) -40 MG per tablet TAKE ONE TABLET BY MOUTH EVERY 4 HOURS AS NEEDED  0     cetirizine (ZYRTEC) 10 MG tablet Take 10 mg by mouth daily        COMPRESSION STOCKINGS 1 each daily 20-30 mmHg Thigh Length measure and fit, color and style per patient preference. Doff n jose per need. 3 each 3     DULoxetine (CYMBALTA) 30 MG EC capsule Take 1 capsule (30 mg) by mouth daily 60 capsule 1     eletriptan (RELPAX) 20 MG tablet Take 1 tablet (20 mg) by mouth at onset of headache for migraine May repeat in 2 hours. Max 4 tablets/24 hours. 18 tablet 1     EPINEPHrine (EPIPEN/ADRENACLICK/OR ANY BX GENERIC EQUIV) 0.3 MG/0.3ML injection 2-pack Inject 0.3 mg into the muscle       famotidine (PEPCID) 20 MG tablet Take 1 tablet (20 mg) by mouth At Bedtime 90 tablet 1     fluticasone (FLONASE) 50 MCG/ACT spray INSTILL 2 SPRAYS INTO BOTH NOSTRILS DAILY 16 mL 3     fluticasone-salmeterol (ADVAIR DISKUS) 250-50 MCG/DOSE diskus inhaler Inhale 1 puff into the lungs 2 times daily as needed        levothyroxine (SYNTHROID) 25 MCG tablet Take 25 mcg by mouth daily       linaclotide (LINZESS) 145 MCG capsule Take 1 capsule (145 mcg) by mouth every morning (before breakfast) Last refill until GI clinic follow up. 30 capsule 0     metoclopramide (REGLAN) 5 MG tablet Take 1-2 tabs po Q 6 hrs as needed for nausea 60 tablet 3     midodrine (PROAMATINE) 5 MG tablet Take 1 tablet (5 mg) by mouth 2 times daily Take 5mg in the morning and 5 mg 4-6 hours later. Do not take within 5 hours of laying down. 180 tablet 3     Multiple Vitamins-Minerals (MULTIVITAMIN PO) Take by mouth daily       omeprazole (PRILOSEC) 20 MG CR capsule Take 1 capsule (20 mg) by mouth daily 90 capsule 2     ondansetron (ZOFRAN-ODT) 4 MG ODT tab Take 1-2 tablets (4-8 mg) by mouth every 12 hours as needed for nausea 60 tablet 1     ONE DAILY MULTIPLE VITAMIN OR        Probiotic Product (PROBIOTIC DAILY PO) Take by mouth 2  "times daily 2 packets daily       rizatriptan (MAXALT-MLT) 5 MG ODT tab TAKE 1-2 TABLETS (5-10 MG) BY MOUTH AT ONSET OF HEADACHE FOR MIGRAINE (MAX 30 MG IN 24 HOURS)  6     SUMAtriptan (IMITREX STATDOSE) 6 MG/0.5ML pen injector kit Inject 0.5 mLs (6 mg) Subcutaneous at onset of headache for migraine (may repeat once after 2 hrs) May repeat in 1 hour. Max 12 mg/24 hours. 2 kit 1     tiZANidine (ZANAFLEX) 4 MG tablet Take 1 tablet (4 mg) by mouth 3 times daily as needed for muscle spasms 90 tablet 1     vitamin D (ERGOCALCIFEROL) 59043 UNIT capsule        zonisamide (ZONEGRAN) 25 MG capsule Take 125mg daily for 2 weeks, then 150mg 360 capsule 3         LABORATORY VALUES:   Recent Labs   Lab Test  03/02/18   1340  10/20/17   1150   NA  138  138   POTASSIUM  3.8  3.7   CHLORIDE  105  107   CO2  24  23   ANIONGAP  9  8   GLC  89  108*   BUN  9  10   CR  0.88  0.78   JUAN  8.9  8.4*       CBC RESULTS:   Recent Labs   Lab Test  10/20/17   1150   WBC  6.1   RBC  4.29   HGB  13.4   HCT  38.8   MCV  90   MCH  31.2   MCHC  34.5   RDW  12.5   PLT  216       Most Recent 3 INR's:  Recent Labs   Lab Test  07/25/17   1249   INR  1.01           ASSESSMENT:    Chronic low back pain radiating to bilateral lower extremity  Persistent headache  Chronic neck pain  Chronic opioid therapy  Clinical diagnosis of Tom-Danlos syndrome  Chronic UTI  EMG finding of lower extremity radiculopathy  Bilateral lumbar facet arthritis  Anxiety disorder  Chronic pain syndrome  Post laminectomy pain syndrome        PLAN:     -  Interventions: None at this time. SUNSHINE's and SCS  options have been discussed with her but this would be a last resort as she feels her body \"rejects foreign objects\".   -  Medications:  getting excellent benefit with butrans at this time and is on a stable dose.    -  Continue tizanidine 8mg to help with her muscle spasms.  Continue naproxen and ACETAMINOPHEN prn.  - Take Amitriptyline 30 mg at bedtime.  - Take Butrans patch 10 " mics per hour.  Change patch every 7 days.  Discussed about increasing the patch to 15 mics per hour.  -Continue to take tramadol 50 mg every 6 hours as needed.  15 tablets dispensed today.  -Start taking Cymbalta 30 mg daily.  The dose may be increased to 60 mg nightly and 30 mg in the morning.  -start taking oxycodone 2.5 mg daily as needed.  15 tablets dispensed today.  - Physical Therapy:  Continue as scheduled - she also does aquatic therapy - she will continue her HEP  - Psychology/Coping skills: continue therapy.   - Diagnostic Studies:  Will follow-up her brain MRI ordered by Dr. Galeano.      Follow up: 2 months          Assessment will be ongoing with changes in treatment as indicated.  Benefits/risks/alternatives to treatment have been reviewed and the patient has been instructed to contact this office if they have any questions or concerns.  This plan of care has been discussed with the patient and the patient is in agreement.      Faiza Martinez MD, PHD

## 2018-05-24 NOTE — PROGRESS NOTES
Date: 5/24/2018    Time of Call: 1:43 PM     Diagnosis:  Dysautonomia, hypotension     [ TORB ] Ordering provider: Tiara Fletcher NP  Order:   Dc midodrine  Fludrocortisone 0.1 mg daily     Order received by: richard brantley rn      Follow-up/additional notes:    Pt agreed to plan, script sent.

## 2018-05-25 ENCOUNTER — MYC MEDICAL ADVICE (OUTPATIENT)
Dept: ORTHOPEDICS | Facility: CLINIC | Age: 48
End: 2018-05-25

## 2018-05-25 ENCOUNTER — MYC MEDICAL ADVICE (OUTPATIENT)
Dept: INTERNAL MEDICINE | Facility: CLINIC | Age: 48
End: 2018-05-25

## 2018-05-25 DIAGNOSIS — M54.2 CERVICALGIA: ICD-10-CM

## 2018-05-25 DIAGNOSIS — Q79.60 EHLERS-DANLOS DISEASE: Primary | ICD-10-CM

## 2018-05-28 LAB
CREAT SERPL-MCNC: 69 MG/DL
HISTAMINE UR-SCNC: 256 NMOL/L
HISTAMINE/CREAT 24H UR-SRTO: 371 NMOL/G CRT (ref 0–450)

## 2018-05-31 DIAGNOSIS — Q79.60 EHLERS-DANLOS SYNDROME: Primary | ICD-10-CM

## 2018-05-31 DIAGNOSIS — R51.9 HEADACHE DISORDER: ICD-10-CM

## 2018-06-01 ENCOUNTER — CARE COORDINATION (OUTPATIENT)
Dept: NEUROLOGY | Facility: CLINIC | Age: 48
End: 2018-06-01

## 2018-06-02 ENCOUNTER — RADIANT APPOINTMENT (OUTPATIENT)
Dept: MRI IMAGING | Facility: CLINIC | Age: 48
End: 2018-06-02
Attending: PSYCHIATRY & NEUROLOGY
Payer: MEDICARE

## 2018-06-02 DIAGNOSIS — R51.9 HEADACHE DISORDER: ICD-10-CM

## 2018-06-02 DIAGNOSIS — R93.0 ABNORMAL MAGNETIC RESONANCE IMAGING OF HEAD: ICD-10-CM

## 2018-06-02 DIAGNOSIS — Q79.60 EHLERS-DANLOS SYNDROME: ICD-10-CM

## 2018-06-02 DIAGNOSIS — M54.2 CERVICALGIA: ICD-10-CM

## 2018-06-04 ENCOUNTER — OFFICE VISIT (OUTPATIENT)
Dept: PHYSICAL MEDICINE AND REHAB | Facility: CLINIC | Age: 48
End: 2018-06-04
Payer: MEDICARE

## 2018-06-04 ENCOUNTER — TELEPHONE (OUTPATIENT)
Dept: ANESTHESIOLOGY | Facility: CLINIC | Age: 48
End: 2018-06-04

## 2018-06-04 ENCOUNTER — PRE VISIT (OUTPATIENT)
Dept: CARDIOLOGY | Facility: CLINIC | Age: 48
End: 2018-06-04

## 2018-06-04 VITALS
DIASTOLIC BLOOD PRESSURE: 90 MMHG | SYSTOLIC BLOOD PRESSURE: 127 MMHG | HEIGHT: 68 IN | WEIGHT: 198 LBS | HEART RATE: 81 BPM | BODY MASS INDEX: 30.01 KG/M2

## 2018-06-04 DIAGNOSIS — Q79.60 EHLERS-DANLOS SYNDROME: ICD-10-CM

## 2018-06-04 DIAGNOSIS — M54.2 NECK PAIN: Primary | ICD-10-CM

## 2018-06-04 DIAGNOSIS — M50.30 DEGENERATIVE DISC DISEASE, CERVICAL: ICD-10-CM

## 2018-06-04 DIAGNOSIS — M96.1 POSTLAMINECTOMY SYNDROME: ICD-10-CM

## 2018-06-04 DIAGNOSIS — M48.02 CERVICAL SPINAL STENOSIS: ICD-10-CM

## 2018-06-04 NOTE — PATIENT INSTRUCTIONS
We addressed the following today:    1. Tom Danlos syndrome  2. Neck pain  3. Neck arthritis    Home exercise program as instructed  Physical therapy: External  Topical Treatments: Ice or Heat  Over the counter medication: Acetaminophen (Tylenol) 1000mg every 6 hours with food (Maximum of 3000mg/day)  Ibuprofen (Advil) maximum of 800mg four times a day with food  Other specific instructions: Referral to Dr. Saleem Araujo - [phone] for consideration of prolotherapy  Follow-up with Dr. Moncada as discussed  Continue with neck brace use as needed  Follow up as needed for further evaluation/medical care (sooner if needed; call direct clinic number [[phone]] at any time with questions/concerns)

## 2018-06-04 NOTE — LETTER
6/4/2018     RE: Ada Welch  [address] [place] [address]     Dear Colleague,    Thank you for referring your patient, Ada Welch, to the [facility] PHYSICAL MEDICINE AND REHABILITATION at [facility]. Please see a copy of my visit note below.    [facility] Physical Medicine & Rehabilitation Clinic Note  Clinic Visit s Jun 4, 2018    Subjective:  Ada Welch is a 47 year old female who is seen in consultation at the request of Dr. Galeano for evaluation of neck pain with radiation.    Symptoms began years ago.  Reports insidious onset without acute precipitating event.  Reports neck pain that is located bilateral with radiation present (bilateral, left greater than right).  Symptoms are generally worse with bending backwards.  Treatment has consisted of a neck brace, medical cannabis, Zanaflex, occipital nerve block (no improvement), and physical therapy (5 years).  Denies any consistent numbness/tingling of the upper extremities.  Notes weakness of the upper extremities.  Denies any previous neck surgeries.    Of note, was previously seen by Dr. Moncada from neurosurgery in November 2017 with recommendations for use of a neck brace on an as-needed basis with surgical intervention thought to be unpredictable at that time with no surgical intervention recommended then.    Patient's past medical, surgical, social, and family histories are reviewed today.  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     Chronic sinusitis 00/2007    Diagnosed with chronic pansinusitis 2016     Chronic tonsillitis 1980    Had tonsils removed  "02/1981, rheumatic fever     Tom-Danlos disease      Gastroesophageal reflux disease 3/1/2017    I have large hiatal hernia     Hernia, abdominal 10/2016    Hiatal Hernia     Hiatal hernia 2016    Have adeline hiatel hernia     Hoarseness Unsure    It comes and goes     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     Migraines 1/1/2007    Unsure of exact date     Nasal polyps 2013    Were revoved 03/2017, benign     Other nervous system complications 1/2014    Autonomic nervous system disorder, neuralgia, neuropathy     Pneumonia Unsure    Have had pneumonia several times     Swelling, mass, or lump in head and neck 08/2016    Lymph node has been growing for last year     Thyroid disease 01/01/2008       Review of Systems:  Neuro: POSITIVE for weakness of the upper extremities  MSK: see HPI    Objective:  /90  Pulse 81  Ht 1.715 m (5' 7.5\")  Wt 89.8 kg (198 lb)  BMI 30.55 kg/m2  General: healthy, alert, and in mild distress  Skin: no suspicious lesions or rashes  Psych: mentation appears normal and affect normal/bright  HEENT: no scleral icterus  CV: no pedal edema  Resp: normal respiratory effort without conversational dyspnea   Neuro: motor strength as noted below  Lymph: no palpable lymphadenopathy    MSK:    CERVICAL SPINE  Inspection:    No gross deformity/asymmetry present  Palpation:    Tender about the occiput, cervical spinous processes, paracervical musculature (left), upper trapezius (left), and rhomboids (bilateral)    No tenderness over the paracervical musculature (right) or upper trapezius (right)  Range of Motion:     Flexion within normal limits    Extension limited by pain    Right side bend limited by tightness    Left side bend limited by tightness/pain    Right rotation limited by tightness    Left rotation limited by pain/tightness  Strength:    Deltoid (C5) 5-/5, biceps (C6) 5/5, wrist extension (C6) 5/5, triceps (C7) 5-/5, wrist flexion (C7) 5/5, and finger " flexion (C8) 5-/5  Special Tests:    Positive: Spurling's (left)    Negative: Spurling's (right)    Imaging:  No x-rays indicated during today's visit  Previous films/report were reviewed today, independent visualization of images was performed, and results were discussed with the patient  EMG/NCV of the Bilateral Upper Extremities - 11/7/2017  Intrepretation:  Abnormal spontaneous activity restricted to the left cervical paraspinal muscles. This finding may be due to left cervical radiculopathy, focal muscle injury or axial myopathy, but, when isolated, it is not diagnostic for any of the above entities. Clinical and imaging correlation is required. Otherwise, the study was normal and showed no evidence of carpal tunnel syndrome, ulnar neuropathy, or cervical radiculopathy on either side.    MR Cervical Spine with and without Contrast - 7/24/2017  Impression:   1. No abnormal enhancement in the spinal cord, thecal sac or cervical vertebrae.  2. Unchanged mild to moderate degenerative changes, most prominent C4-C7. Moderate right neuroforaminal narrowing at C5-C6.     ASSESSMENT:  1.  Neck pain  2.  Cervical spine instability  3.  Cervical degenerative disc disease  4.  Cervical spinal stenosis  5.  Tom-Danlos syndrome    PLAN:  1.  Referral to Dr. Saleem Araujo as requesting for consideration of prolotherapy for further treatment purposes.  2.  Follow-up with Dr. Moncada from neurosurgery for consideration of surgical intervention for further treatment purposes.  3.  Activity modification as discussed, including limitation of activities that cause pain/discomfort.  4.  Acetaminophen/Ibuprofen/ice/heat as needed for improved pain control.  Unable to use additional oral pain medication because of intolerances previously noted per the patient.  5.  Follow-up with pain management for further treatment purposes as discussed.  6.  Continue with use of a neck brace on an as needed basis as this time as would not recommend  continued use because of weakness that could be noted with continuation/prolonged use.  7.  Continue with formal physical therapy/home exercise program for further treatment purposes.  8.  Follow-up as needed for further evaluation/medical care.      Bob Porter DO, KAREY    Department of Rehabilitation Medicine    I spent a total of 30 minutes face-to-face with the patient during today's office visit.  Over 50% of the time was spent counseling the patient and/or coordinating care.  See office note for details.

## 2018-06-04 NOTE — PROGRESS NOTES
[facility] Physical Medicine & Rehabilitation Clinic Note  Clinic Visit s Jun 4, 2018    Subjective:  Ada Welch is a 47 year old female who is seen in consultation at the request of Dr. Galeano for evaluation of neck pain with radiation.    Symptoms began years ago.  Reports insidious onset without acute precipitating event.  Reports neck pain that is located bilateral with radiation present (bilateral, left greater than right).  Symptoms are generally worse with bending backwards.  Treatment has consisted of a neck brace, medical cannabis, Zanaflex, occipital nerve block (no improvement), and physical therapy (5 years).  Denies any consistent numbness/tingling of the upper extremities.  Notes weakness of the upper extremities.  Denies any previous neck surgeries.    Of note, was previously seen by Dr. Moncada from neurosurgery in November 2017 with recommendations for use of a neck brace on an as-needed basis with surgical intervention thought to be unpredictable at that time with no surgical intervention recommended then.    Patient's past medical, surgical, social, and family histories are reviewed today.  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     Chronic sinusitis 00/2007    Diagnosed with chronic pansinusitis 2016     Chronic tonsillitis 1980    Had tonsils removed 02/1981, rheumatic fever     Tom-Danlos disease      Gastroesophageal reflux disease 3/1/2017    I have large hiatal hernia     Hernia, abdominal 10/2016    Hiatal Hernia     Hiatal hernia 2016    Have adeline hiatel hernia     Hoarseness Unsure    It comes and goes     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/2015     "Common with Tom Danlos Syndrome     Migraines 1/1/2007    Unsure of exact date     Nasal polyps 2013    Were revoved 03/2017, benign     Other nervous system complications 1/2014    Autonomic nervous system disorder, neuralgia, neuropathy     Pneumonia Unsure    Have had pneumonia several times     Swelling, mass, or lump in head and neck 08/2016    Lymph node has been growing for last year     Thyroid disease 01/01/2008       Review of Systems:  Neuro: POSITIVE for weakness of the upper extremities  MSK: see HPI    Objective:  /90  Pulse 81  Ht 1.715 m (5' 7.5\")  Wt 89.8 kg (198 lb)  BMI 30.55 kg/m2  General: healthy, alert, and in mild distress  Skin: no suspicious lesions or rashes  Psych: mentation appears normal and affect normal/bright  HEENT: no scleral icterus  CV: no pedal edema  Resp: normal respiratory effort without conversational dyspnea   Neuro: motor strength as noted below  Lymph: no palpable lymphadenopathy    MSK:    CERVICAL SPINE  Inspection:    No gross deformity/asymmetry present  Palpation:    Tender about the occiput, cervical spinous processes, paracervical musculature (left), upper trapezius (left), and rhomboids (bilateral)    No tenderness over the paracervical musculature (right) or upper trapezius (right)  Range of Motion:     Flexion within normal limits    Extension limited by pain    Right side bend limited by tightness    Left side bend limited by tightness/pain    Right rotation limited by tightness    Left rotation limited by pain/tightness  Strength:    Deltoid (C5) 5-/5, biceps (C6) 5/5, wrist extension (C6) 5/5, triceps (C7) 5-/5, wrist flexion (C7) 5/5, and finger flexion (C8) 5-/5  Special Tests:    Positive: Spurling's (left)    Negative: Spurling's (right)    Imaging:  No x-rays indicated during today's visit  Previous films/report were reviewed today, independent visualization of images was performed, and results were discussed with the patient  EMG/NCV of the " Bilateral Upper Extremities - 11/7/2017  Intrepretation:  Abnormal spontaneous activity restricted to the left cervical paraspinal muscles. This finding may be due to left cervical radiculopathy, focal muscle injury or axial myopathy, but, when isolated, it is not diagnostic for any of the above entities. Clinical and imaging correlation is required. Otherwise, the study was normal and showed no evidence of carpal tunnel syndrome, ulnar neuropathy, or cervical radiculopathy on either side.    MR Cervical Spine with and without Contrast - 7/24/2017  Impression:   1. No abnormal enhancement in the spinal cord, thecal sac or cervical vertebrae.  2. Unchanged mild to moderate degenerative changes, most prominent C4-C7. Moderate right neuroforaminal narrowing at C5-C6.     ASSESSMENT:  1.  Neck pain  2.  Cervical spine instability  3.  Cervical degenerative disc disease  4.  Cervical spinal stenosis  5.  Tom-Danlos syndrome    PLAN:  1.  Referral to Dr. Saleem Araujo as requesting for consideration of prolotherapy for further treatment purposes.  2.  Follow-up with Dr. Moncada from neurosurgery for consideration of surgical intervention for further treatment purposes.  3.  Activity modification as discussed, including limitation of activities that cause pain/discomfort.  4.  Acetaminophen/Ibuprofen/ice/heat as needed for improved pain control.  Unable to use additional oral pain medication because of intolerances previously noted per the patient.  5.  Follow-up with pain management for further treatment purposes as discussed.  6.  Continue with use of a neck brace on an as needed basis as this time as would not recommend continued use because of weakness that could be noted with continuation/prolonged use.  7.  Continue with formal physical therapy/home exercise program for further treatment purposes.  8.  Follow-up as needed for further evaluation/medical care.      Bob Porter DO, CAM    Department  of Rehabilitation Medicine    I spent a total of 30 minutes face-to-face with the patient during today's office visit.  Over 50% of the time was spent counseling the patient and/or coordinating care.  See office note for details.

## 2018-06-04 NOTE — MR AVS SNAPSHOT
After Visit Summary   6/4/2018    Ada Welch    MRN: [id number]           Patient Information     Date Of Birth          1970        Visit Information        Provider Department      6/4/2018 11:00 AM Bob Porter [facility] Physical Medicine and Rehabilitation        Today's Diagnoses     Neck pain    -  1    Degenerative disc disease, cervical        Cervical spinal stenosis        Tom-Danlos syndrome          Care Instructions    We addressed the following today:    1. Tom Danlos syndrome  2. Neck pain  3. Neck arthritis    Home exercise program as instructed  Physical therapy: External  Topical Treatments: Ice or Heat  Over the counter medication: Acetaminophen (Tylenol) 1000mg every 6 hours with food (Maximum of 3000mg/day)  Ibuprofen (Advil) maximum of 800mg four times a day with food  Other specific instructions: Referral to Dr. Araujo - [phone] for consideration of prolotherapy  Follow-up with Dr. Moncada as discussed  Continue with neck pain use as needed  Follow up as needed for further evaluation/medical care (sooner if needed; call direct clinic number [[phone]] at any time with questions/concerns)            Follow-ups after your visit        Additional Services     SPORTS MEDICINE REFERRAL       Your provider has referred you to:  Dr. Saleem Stacy [phone]    Please be aware that coverage of these services is subject to the terms and limitations of your health insurance plan.  Call member services at your health plan with any benefit or coverage questions.      Please bring the following to your appointment:    >>   Any x-rays, CTs or MRIs which have been performed.  Contact the facility where they were done to arrange for  prior to your scheduled appointment.    >>   List of current medications   >>   This referral request   >>   Any documents/labs given to you for this referral                  Your next 10 appointments already scheduled      Jun 07, 2018  9:30 AM CDT   Ech Complete with UCECHCR1   [facility] ([facility])    [address]  3rd Floor  [facility] [id number] [phone]           1. Please bring or wear a comfortable two-piece outfit. 2. You may eat, drink and take your normal medicines. 3. For any questions that cannot be answered, please contact the ordering physician            Jun 07, 2018 10:30 AM CDT   (Arrive by 10:15 AM)   Implantable Loop Recorder with Uc Cv Device 1   [facility] ([facility])    [address] [place] [address]  [address] [place] [id number] [phone]            Jun 07, 2018 11:00 AM CDT   (Arrive by 10:45 AM)   RETURN ARRHYTHMIA with FIDELINA Mora [facility] ([facility])    [address] [place] [address]  [address] [place] [id number] [phone]            Jun 12, 2018  2:40 PM CDT   (Arrive by 2:25 PM)   Return Visit with Stephen Xiong PA-C   [facility] ([facility])    [address] [place] [address]  [address] [facility] [id number] [phone]            Jun 29, 2018 10:00 AM CDT   (Arrive by 9:45 AM)   Return Visit with Faiza Martinez MD   [facility] ([facility])    [address] [place] [address]  [address] [facility] [id number] [phone]            Jul 11, 2018 11:15 AM CDT   (Arrive by 11:00 AM)   Return Visit with Wade Singh MD   [facility]rolog[facility] ([facility])    [address] [place] [address]  4th Floor  [facility] [id number] [phone]            Jul 23, 2018 12:15 PM CDT   (Arrive by 12:00 PM)   New Allergy with Vahe Rodriguez MD   [facility] ([facility])    [address] [place] [address]  [address] [place] [phone]  "  [phone]           Do not take anti-histamines or Zantac for seven day prior to your appointment.            Aug 08, 2018  4:20 PM CDT   (Arrive by 4:05 PM)   Return Visit with Juana Griffith MD   [facility] ([facility])    [address] [place] [id number] [phone]            Oct 25, 2018  9:25 AM CDT   (Arrive by 9:10 AM)   Return Visit with Ellyn Rivera MD   [facility] ([facility])    [address] [place] [id number] [phone]              Who to contact     Please call your clinic at [phone] to:    Ask questions about your health    Make or cancel appointments    Discuss your medicines    Learn about your test results    Speak to your doctor            Additional Information About Your Visit        [employer] Information     [employer] gives you secure access to your electronic health record. If you see a primary care provider, you can also send messages to your care team and make appointments. If you have questions, please call your primary care clinic.  If you do not have a primary care provider, please call [phone] and they will assist you.      [employer] is an electronic gateway that provides easy, online access to your medical records. With [employer], you can request a clinic appointment, read your test results, renew a prescription or communicate with your care team.     To access your existing account, please contact your [facility] or call [phone] for assistance.        Care EveryWhere ID     This is your Care EveryWhere ID. This could be used by other organizations to access your [facility] medical records  [id number]        Your Vitals Were     Pulse Height BMI (Body Mass Index)             81 1.715 m (5' 7.5\") 30.55 kg/m2          Blood Pressure from Last 3 Encounters:   06/04/18 127/90 "   05/24/18 105/70   05/03/18 126/82    Weight from Last 3 Encounters:   06/04/18 89.8 kg (198 lb)   05/18/18 90 kg (198 lb 6.4 oz)   05/03/18 88.1 kg (194 lb 4.8 oz)              We Performed the Following     SPORTS MEDICINE REFERRAL        Primary Care Provider Office Phone # Fax #    Ellyn Rivera [phone]       [address] [address]        Equal Access to Services     [facility]: Hadii aad ku hadasho Soomaali, waaxda luqadaha, qaybta kaalmada adeegyada, waxbrian whitehead haystanley dykes . So [facility] [phone].    ATENCIÓN: Si habla español, tiene a sweeney disposición servicios gratuitos de asistencia lingüística. [facility] [phone].    We comply with applicable federal civil rights laws and [place] laws. We do not discriminate on the basis of race, color, national origin, age, disability, sex, sexual orientation, or gender identity.            Thank you!     Thank you for choosing [facility] PHYSICAL MEDICINE AND REHABILITATION  for your care. Our goal is always to provide you with excellent care. Hearing back from our patients is one way we can continue to improve our services. Please take a few minutes to complete the written survey that you may receive in the mail after your visit with us. Thank you!             Your Updated Medication List - Protect others around you: Learn how to safely use, store and throw away your medicines at www.disposemymeds.org.          This list is accurate as of 6/4/18 12:03 PM.  Always use your most recent med list.                   Brand Name Dispense Instructions for use Diagnosis    ADVAIR DISKUS 250-50 MCG/DOSE diskus inhaler   Generic drug:  fluticasone-salmeterol      Inhale 1 puff into the lungs 2 times daily as needed        albuterol 108 (90 Base) MCG/ACT Inhaler    PROAIR HFA/PROVENTIL HFA/VENTOLIN HFA     Inhale 2 puffs into the lungs        amitriptyline 10 MG tablet    ELAVIL    270 tablet    Take 3 tablets  (30 mg) by mouth At Bedtime    Chronic migraine without aura without status migrainosus, not intractable       buprenorphine 10 MCG/HR WK patch    BUTRANS    4 patch    Place 1 patch onto the skin every 7 days    Postlaminectomy syndrome of lumbar region       butalbital-acetaminophen-caffeine -40 MG per tablet    FIORICET/ESGIC     TAKE ONE TABLET BY MOUTH EVERY 4 HOURS AS NEEDED        cetirizine 10 MG tablet    zyrTEC     Take 10 mg by mouth daily        COMPRESSION STOCKINGS     3 each    1 each daily 20-30 mmHg Thigh Length measure and fit, color and style per patient preference. Doff n jose per need.    Orthostatic hypotension       DULoxetine 30 MG EC capsule    CYMBALTA    60 capsule    Take 1 capsule (30 mg) by mouth daily    Lumbar post-laminectomy syndrome       eletriptan 20 MG tablet    RELPAX    18 tablet    Take 1 tablet (20 mg) by mouth at onset of headache for migraine May repeat in 2 hours. Max 4 tablets/24 hours.    Chronic daily headache, Intractable migraine without aura and with status migrainosus       EPINEPHrine 0.3 MG/0.3ML injection 2-pack    EPIPEN/ADRENACLICK/or ANY BX GENERIC EQUIV     Inject 0.3 mg into the muscle        famotidine 20 MG tablet    PEPCID    90 tablet    Take 1 tablet (20 mg) by mouth At Bedtime    Hiatal hernia, Mast cell disorder       fludrocortisone 0.1 MG tablet    FLORINEF    30 tablet    Take 1 tablet (0.1 mg) by mouth daily    Autonomic dysfunction       fluticasone 50 MCG/ACT spray    FLONASE    16 mL    INSTILL 2 SPRAYS INTO BOTH NOSTRILS DAILY    Chronic rhinitis, unspecified type       linaclotide 145 MCG capsule    LINZESS    30 capsule    Take 1 capsule (145 mcg) by mouth every morning (before breakfast) Last refill until GI clinic follow up.    Chronic idiopathic constipation       metoclopramide 5 MG tablet    REGLAN    60 tablet    Take 1-2 tabs po Q 6 hrs as needed for nausea        MULTIVITAMIN PO      Take by mouth daily        omeprazole 20  MG CR capsule    priLOSEC    90 capsule    Take 1 capsule (20 mg) by mouth daily    Hiatal hernia       ondansetron 4 MG ODT tab    ZOFRAN-ODT    60 tablet    Take 1-2 tablets (4-8 mg) by mouth every 12 hours as needed for nausea    Migraine without status migrainosus, not intractable, unspecified migraine type       ONE DAILY MULTIPLE VITAMIN OR           oxyCODONE IR 5 MG tablet    ROXICODONE    15 tablet    Take 0.5 tablets (2.5 mg) by mouth every 6 hours as needed for severe pain    Postlaminectomy syndrome       PROBIOTIC DAILY PO      Take by mouth 2 times daily 2 packets daily        rizatriptan 5 MG ODT tab    MAXALT-MLT     TAKE 1-2 TABLETS (5-10 MG) BY MOUTH AT ONSET OF HEADACHE FOR MIGRAINE (MAX 30 MG IN 24 HOURS)        SUMAtriptan 6 MG/0.5ML pen injector kit    IMITREX STATDOSE    2 kit    Inject 0.5 mLs (6 mg) Subcutaneous at onset of headache for migraine (may repeat once after 2 hrs) May repeat in 1 hour. Max 12 mg/24 hours.    Chronic migraine without aura without status migrainosus, not intractable       SYNTHROID 25 MCG tablet   Generic drug:  levothyroxine      Take 25 mcg by mouth daily        tiZANidine 4 MG tablet    ZANAFLEX    90 tablet    Take 1 tablet (4 mg) by mouth 3 times daily as needed for muscle spasms    Post laminectomy syndrome       traMADol 50 MG tablet    ULTRAM    15 tablet    Take 1 tablet (50 mg) by mouth every 6 hours as needed for pain maximum 4 tablet(s) per day    Lumbar post-laminectomy syndrome       vitamin D 21353 UNIT capsule    ERGOCALCIFEROL          zonisamide 25 MG capsule    Zonegran    360 capsule    Take 125mg daily for 2 weeks, then 150mg    Occipital neuritis

## 2018-06-04 NOTE — TELEPHONE ENCOUNTER
[facility] Call Center    Phone Message    May a detailed message be left on voicemail: yes    Reason for Call: Other: Pt would like to discuss a sooner appt with Dr. Martinez, and also has some questions for Jessica.     Action Taken: Message routed to:  [facility] ([facility] Pain

## 2018-06-05 ENCOUNTER — MYC REFILL (OUTPATIENT)
Dept: ANESTHESIOLOGY | Facility: CLINIC | Age: 48
End: 2018-06-05

## 2018-06-05 DIAGNOSIS — M96.1 POST LAMINECTOMY SYNDROME: ICD-10-CM

## 2018-06-05 RX ORDER — OXYCODONE HYDROCHLORIDE 5 MG/1
2.5 TABLET ORAL EVERY 6 HOURS PRN
Qty: 15 TABLET | Refills: 0 | Status: SHIPPED | OUTPATIENT
Start: 2018-06-05 | End: 2018-06-29

## 2018-06-05 NOTE — TELEPHONE ENCOUNTER
Verbal orders given by Dr. Martinez to refill oxycodone.   checked and is appropriate.  Prescription mailed to pt.     Jessica De Souza, RN, BSN

## 2018-06-06 NOTE — TELEPHONE ENCOUNTER
Message from Xiimohart:  Original authorizing provider: MD Ada Salazar would like a refill of the following medications:  tiZANidine (ZANAFLEX) 4 MG tablet [Faiza Martinez MD]    Preferred pharmacy: [facility] [address]    Comment:  Can we do every 6 hours instead of every 8? Thanks, Tiff

## 2018-06-07 ENCOUNTER — TELEPHONE (OUTPATIENT)
Dept: NEUROLOGY | Facility: CLINIC | Age: 48
End: 2018-06-07

## 2018-06-07 ENCOUNTER — OFFICE VISIT (OUTPATIENT)
Dept: CARDIOLOGY | Facility: CLINIC | Age: 48
End: 2018-06-07
Attending: NURSE PRACTITIONER
Payer: COMMERCIAL

## 2018-06-07 ENCOUNTER — TELEPHONE (OUTPATIENT)
Dept: INTERNAL MEDICINE | Facility: CLINIC | Age: 48
End: 2018-06-07

## 2018-06-07 ENCOUNTER — RADIANT APPOINTMENT (OUTPATIENT)
Dept: CARDIOLOGY | Facility: CLINIC | Age: 48
End: 2018-06-07
Payer: MEDICARE

## 2018-06-07 VITALS
OXYGEN SATURATION: 100 % | DIASTOLIC BLOOD PRESSURE: 77 MMHG | SYSTOLIC BLOOD PRESSURE: 105 MMHG | HEIGHT: 67 IN | WEIGHT: 200.6 LBS | BODY MASS INDEX: 31.48 KG/M2 | HEART RATE: 79 BPM

## 2018-06-07 DIAGNOSIS — R00.0 SINUS TACHYCARDIA: ICD-10-CM

## 2018-06-07 DIAGNOSIS — R55 PRE-SYNCOPE: Primary | ICD-10-CM

## 2018-06-07 DIAGNOSIS — G90.9 AUTONOMIC DYSFUNCTION: ICD-10-CM

## 2018-06-07 DIAGNOSIS — R00.0 TACHYCARDIA: ICD-10-CM

## 2018-06-07 PROCEDURE — G0463 HOSPITAL OUTPT CLINIC VISIT: HCPCS | Mod: ZF

## 2018-06-07 PROCEDURE — 99214 OFFICE O/P EST MOD 30 MIN: CPT | Mod: ZP | Performed by: NURSE PRACTITIONER

## 2018-06-07 RX ORDER — LIDOCAINE 40 MG/G
CREAM TOPICAL
Status: CANCELLED | OUTPATIENT
Start: 2018-06-07

## 2018-06-07 RX ORDER — FLUDROCORTISONE ACETATE 0.1 MG/1
0.2 TABLET ORAL DAILY
Qty: 180 TABLET | Refills: 3 | Status: SHIPPED | OUTPATIENT
Start: 2018-06-07 | End: 2018-10-25

## 2018-06-07 ASSESSMENT — PAIN SCALES - GENERAL: PAINLEVEL: NO PAIN (0)

## 2018-06-07 NOTE — LETTER
"6/7/2018      RE: Ada Welch  [address] [place] [address]       Dear Colleague,    Thank you for the opportunity to participate in the care of your patient, Ada Welch, at the [facility] HEART [facility]. Please see a copy of my visit note below.        Clinical Cardiac Electrophysiology  [facility] [place]    HPI: Ms. Scar Welch is a 47 year-old female who presents with her daughter for follow up of autonomic dysfuction.  The patient has a past medical history significant for dysautonomia, Tom-Danlos, Chiari malformation, eosinophilic esophagitis, and gastroparesis. Since 2014, she notes that her heart rates have fluctuated from as low as 40 to as high as 150 bpm and her BP would also fluctuate at the same time without known aggravating factors. Fluctuations of HR happen when she is supine, sitting, standing, walking, etc and symptoms associated with the fluctuations include LH and dizziness. She had two episodes of blacking out for a few seconds with a prodrome of LH, dizziness, nausea, diaphoresis, black spots in vision before the episodes and was noted to be pale by her . She saw Dr. Tello with these complaints and patient had a ILR placed 8/2017. Patient has several signs of autonomic dysfunction including HR elevation with positional changes found with orthostats in clinic 10/5/2017 and HR elevation to the 170's on ILR. However, there are some atypical features such as HR elevation at home in any position (supine or standing). Given poor sensing of ILR, a Cardionet was done 10/5/2017-10/14/2017 and showed sinus rhythm and frequent sinus tachycardia correlating with symptoms of dyspnea and racing heart.  A repeat tilt table was done 10/20/2017 and was normal.  She recently failed midodrine as the patient stated that it caused her blood pressures to drop \"very low\" in the mornings after taking it. She was subsequently started on fludrocortisone 0.1 " "mg tablet daily. Her ILR is at RRT.     Patient states that never misses doses of medication. Continues to have low BPs (systolic 90s) and \"brain fog\" which is worse in the mornings. Has occasional higher BP (systolic 150-170s) later in the day or the evening.  She states that she is symptomatic with palpitations during sinus tachycardia, notes difficulty sleeping when HRs are in the 160s-170s at night.  Has palpitations off and on most days of the week.  States that activity level is light, she is trying to be more active.  She is drinking water/propol mix 60 oz daily.  Denies chest pain or pressure, syncope, dyspnea at rest or with exertion, orthopnea, PND, abdominal edema, pedal edema, or claudication.     Orthostatic BP: supine 103/74 HR 73 tired, sitting immediate 103/79 HR 77 tired, standing immediate 14/83 tired, standing 1 minute 105/84 HR 88 weak, standing 2 minute 105/77 HR 79 exhausted and weak.     Remote transmission 5/9/2018:     Current Outpatient Prescriptions   Medication Sig Dispense Refill     albuterol (PROAIR HFA/PROVENTIL HFA/VENTOLIN HFA) 108 (90 BASE) MCG/ACT Inhaler Inhale 2 puffs into the lungs       amitriptyline (ELAVIL) 10 MG tablet Take 3 tablets (30 mg) by mouth At Bedtime 270 tablet 3     buprenorphine (BUTRANS) 10 MCG/HR WK patch Place 1 patch onto the skin every 7 days 4 patch 2     butalbital-acetaminophen-caffeine (FIORICET/ESGIC) -40 MG per tablet TAKE ONE TABLET BY MOUTH EVERY 4 HOURS AS NEEDED  0     cetirizine (ZYRTEC) 10 MG tablet Take 10 mg by mouth daily        COMPRESSION STOCKINGS 1 each daily 20-30 mmHg Thigh Length measure and fit, color and style per patient preference. Doff n jose per need. 3 each 3     DULoxetine (CYMBALTA) 30 MG EC capsule Take 1 capsule (30 mg) by mouth daily 60 capsule 1     eletriptan (RELPAX) 20 MG tablet Take 1 tablet (20 mg) by mouth at onset of headache for migraine May repeat in 2 hours. Max 4 tablets/24 hours. 18 tablet 1     " EPINEPHrine (EPIPEN/ADRENACLICK/OR ANY BX GENERIC EQUIV) 0.3 MG/0.3ML injection 2-pack Inject 0.3 mg into the muscle       famotidine (PEPCID) 20 MG tablet Take 1 tablet (20 mg) by mouth At Bedtime 90 tablet 1     fludrocortisone (FLORINEF) 0.1 MG tablet Take 1 tablet (0.1 mg) by mouth daily 30 tablet 3     fluticasone (FLONASE) 50 MCG/ACT spray INSTILL 2 SPRAYS INTO BOTH NOSTRILS DAILY 16 mL 3     fluticasone-salmeterol (ADVAIR DISKUS) 250-50 MCG/DOSE diskus inhaler Inhale 1 puff into the lungs 2 times daily as needed        levothyroxine (SYNTHROID) 25 MCG tablet Take 25 mcg by mouth daily       linaclotide (LINZESS) 145 MCG capsule Take 1 capsule (145 mcg) by mouth every morning (before breakfast) Last refill until GI clinic follow up. 30 capsule 0     metoclopramide (REGLAN) 5 MG tablet Take 1-2 tabs po Q 6 hrs as needed for nausea 60 tablet 3     Multiple Vitamins-Minerals (MULTIVITAMIN PO) Take by mouth daily       omeprazole (PRILOSEC) 20 MG CR capsule Take 1 capsule (20 mg) by mouth daily 90 capsule 2     ondansetron (ZOFRAN-ODT) 4 MG ODT tab Take 1-2 tablets (4-8 mg) by mouth every 12 hours as needed for nausea 60 tablet 1     ONE DAILY MULTIPLE VITAMIN OR        oxyCODONE IR (ROXICODONE) 5 MG tablet Take 0.5 tablets (2.5 mg) by mouth every 6 hours as needed for severe pain 15 tablet 0     Probiotic Product (PROBIOTIC DAILY PO) Take by mouth 2 times daily 2 packets daily       rizatriptan (MAXALT-MLT) 5 MG ODT tab TAKE 1-2 TABLETS (5-10 MG) BY MOUTH AT ONSET OF HEADACHE FOR MIGRAINE (MAX 30 MG IN 24 HOURS)  6     SUMAtriptan (IMITREX STATDOSE) 6 MG/0.5ML pen injector kit Inject 0.5 mLs (6 mg) Subcutaneous at onset of headache for migraine (may repeat once after 2 hrs) May repeat in 1 hour. Max 12 mg/24 hours. 2 kit 1     tiZANidine (ZANAFLEX) 4 MG tablet Take 1 tablet (4 mg) by mouth 3 times daily as needed for muscle spasms 90 tablet 3     traMADol (ULTRAM) 50 MG tablet Take 1 tablet (50 mg) by mouth every  6 hours as needed for pain maximum 4 tablet(s) per day 15 tablet 1     vitamin D (ERGOCALCIFEROL) 42645 UNIT capsule        zonisamide (ZONEGRAN) 25 MG capsule Take 125mg daily for 2 weeks, then 150mg 360 capsule 3       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     Chronic sinusitis 00/2007    Diagnosed with chronic pansinusitis 2016     Chronic tonsillitis 1980    Had tonsils removed 02/1981, rheumatic fever     Tom-Danlos disease      Gastroesophageal reflux disease 3/1/2017    I have large hiatal hernia     Hernia, abdominal 10/2016    Hiatal Hernia     Hiatal hernia 2016    Have adeline hiatel hernia     Hoarseness Unsure    It comes and goes     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 Remove screws/rods     BIOPSY  01/01/2008 & 3/2017    mole biopsy, lymph node biopsy     COLONOSCOPY  3/2017    Colonoscopy and GI     NASAL/SINUS POLYPECTOMY  2017    Polyps were benign     TONSILLECTOMY       TONSILLECTOMY  1981     TUBAL LIGATION         Family History   Problem Relation Age of Onset     Connective Tissue Disorder Mother      eds      "Connective Tissue Disorder Sister      eds     CANCER Father      Spinal tumor grew into spinal cord, went in brain     Migraines Father      DIABETES Maternal Grandmother      Elderly diabetes     HEART DISEASE Maternal Grandmother      Hypertension Maternal Grandmother      DIABETES Paternal Grandmother      Elderly diabetes     DIABETES Paternal Grandfather      Elderly diabetes     Asthma Sister      Pediatric Asthma     Migraines Sister      Asthma Son      Pediatric Asthma     Thyroid Disease Sister      ,     Migraines Sister      Migraines Sister      Breast Cancer No family hx of        Social History   Substance Use Topics     Smoking status: Former Smoker     Packs/day: 0.20     Years: 10.00     Types: Cigarettes     Start date: 1/1/1991     Quit date: 12/1/2013     Smokeless tobacco: Never Used      Comment: I somked on and off during specified dates.     Alcohol use Yes      Comment: Less than 5 drinks per year       Allergies   Allergen Reactions     Bee Venom Anaphylaxis, Difficulty breathing, Palpitations, Shortness Of Breath and Visual Disturbance     Patient does carry Epi-Pen     No Clinical Screening - See Comments Anaphylaxis     Chicken-Derived Products (Egg) Diarrhea and Nausea     Patient will self monitor  Other reaction(s): Nausea  Patient will self monitor  Other reaction(s): GI intolerance     Gabapentin      Gluten Meal      Lactose Dermatitis and Diarrhea     Milk Digestant Nausea     Patient is aware of milk sensitivity, will manage own diet     Milk Protein Extract      Other reaction(s): GI intolerance\  EGGS      Topiramate      Wheat Diarrhea     Wheat Bran Nausea     Patient will self monitor  Other reaction(s): Nausea  Patient will self monitor         ROS:   Negative except for as indicated in HPI.    Physical Examination:  Vitals: Ht 1.702 m (5' 7\")  Wt 91 kg (200 lb 9.6 oz)  BMI 31.42 kg/m2  BMI= Body mass index is 31.42 kg/(m^2).    GENERAL APPEARANCE: healthy, alert, and " no acute distress  HEENT: no icterus, no xanthelasmas, normal pupil size and reaction, no cyanosis.  NECK: no asymmetry, no cervical bruits, no JVD   CHEST: lungs clear to auscultation - no rales, rhonchi or wheezes, no use of accessory muscles, no retractions, respirations are unlabored, normal respiratory rate  CARDIOVASCULAR: regular rhythm, normal S1 with physiologic split S2, no S3 or S4 and no murmur, click or rub  ABDOMEN:  no abdominal bruits, soft, non-tender  EXTREMITIES: no clubbing, cyanosis, or edema  NEURO: alert and oriented to person/place/time, normal speech, gait and affect. Wearing a C collar.   VASC: Radial and posterior tibialis pulses +2 and symmetric bilaterally.   SKIN: no ecchymoses, no rashes    Laboratory:  Lab Results   Component Value Date    WBC 6.1 10/20/2017    RBC 4.29 10/20/2017    HGB 13.4 10/20/2017    HCT 38.8 10/20/2017    MCV 90 10/20/2017    MCH 31.2 10/20/2017    MCHC 34.5 10/20/2017    RDW 12.5 10/20/2017     10/20/2017     Lab Results   Component Value Date     03/02/2018    POTASSIUM 3.8 03/02/2018    CHLORIDE 105 03/02/2018    CO2 24 03/02/2018    ANIONGAP 9 03/02/2018    GLC 89 03/02/2018    BUN 9 03/02/2018    CR 0.88 03/02/2018    GFRESTIMATED 69 03/02/2018    GFRESTBLACK 84 03/02/2018    JUAN 8.9 03/02/2018      Lab Results   Component Value Date    INR 1.01 07/25/2017     No results found for: CKTOTAL, CKMB, TROPN  Cholesterol (mg/dL)   Date Value   04/26/2018 244 (H)     HDL Cholesterol (mg/dL)   Date Value   04/26/2018 48 (L)     LDL Cholesterol Calculated (mg/dL)   Date Value   04/26/2018 164 (H)       ECHO:   ECHOCARDIOGRAM 2016:   Location: [facility]   Room:   Status:OutPatient   Study time: 8 42     Allergies: NO KNOWN ALLRGY, NO LATEX ALGY     Reading Physician:  ALEX LOPEZ MD   Referring Physician:  ALEX LOPEZ MD   Adult Cardiac Sonographer:  SONY SOLIS RDCS     Indications: Tachycardia.     Procedure: The  transthoracic approach was used. The study included complete 2D   imaging, M-mode, complete spectral Doppler, and color Doppler.     Summary:   Left ventricle: Size was normal. Systolic function was normal by visual   assessment. Ejection fraction was estimated to be 55 %. There were no regional   wall motion abnormalities. Wall thickness was normal. Doppler parameters were   consistent with abnormal left ventricular relaxation (mild diastolic   dysfunction).   Aortic valve, LVOT: The valve was tricuspid. Aortic cusps demonstrated normal in   thickness and normal cuspal separation. There was no stenosis. There was no   regurgitation.   Mitral valve: Valve structure was normal. There was no evidence for stenosis.   There was no regurgitation.   Left atrium: Size was normal.   Right ventricle: The size was normal. Systolic function was normal. Wall   thickness was normal.   Tricuspid valve: There was no significant regurgitation.   Right atrium: Size was normal.   Pericardium, pleura: There was no pericardial effusion.     Comparisons:   There has been no significant interval change. Comparison was made with the   previous study of 05-Aug-2008.     Left ventricle: Size was normal. Systolic function was normal by visual   assessment. Ejection fraction was estimated to be 55 %. There were no regional   wall motion abnormalities. Wall thickness was normal. Doppler: Doppler   parameters were consistent with abnormal left ventricular relaxation (mild   diastolic dysfunction).     Aortic valve: The valve was tricuspid. Aortic cusps demonstrated normal in   thickness and normal cuspal separation. Doppler: Transaortic velocity was   within the normal range. There was no stenosis. There was no regurgitation.     Aorta: The root exhibited normal size. The ascending aorta was normal in size.     Mitral valve: Valve structure was normal. There was normal leaflet separation.   Doppler: The transmitral velocity was within the  normal range. There was no   evidence for stenosis. There was no regurgitation.     Left atrium: Size was normal.     Atrial septum: There was no shunting across the atrial septum.     Right ventricle: The size was normal. Systolic function was normal. Wall   thickness was normal.     Pulmonic valve: The valve structure was normal. Doppler: There was no   significant regurgitation.     Tricuspid valve: Doppler: There was no significant regurgitation.     Right atrium: Size was normal.     Systemic veins: IVC: The inferior vena cava was normal in size and course.   Respirophasic changes were normal.     Pericardium: There was no pericardial effusion. The pericardium was normal in   appearance.     Measurement tables     2D measurements   Left ventricle   (Reference normals)   LVID ed, PLAX   39 mm   (35-60)   LVID es, PLAX   23 mm   (21-40)   FS, PLAX   41.03 %   (25-46)   LVPW thickness ed   11 mm   (6-11)   Ratio, IVS/LVPW   0.91    (<1.3)   Ventricular septum   (Reference normals)   IVS thickness ed   10 mm   (6-11)   LVOT   (Reference normals)   Diam   24 mm   (--)   Left atrium   (Reference normals)   Vol index    13.5 cc/m squared   (11-38)     M-mode measurements   Aorta   (Reference normals)   Root diam ed   37 mm   (20-37)     Doppler measurements   LVOT   (Reference normals)   Peak britni   83 cm/s   (--)   VTI   14.6 cm   (--)   Stroke vol   66.05 ml   (--)   Aortic valve   (Reference normals)   Peak britni   103 cm/s   (--)   VTI   19.33 cm   (--)   DSI, VTI   0.76    (--)   Valve area, VTI   3.44 cm squared   (--)   Obstr index, Vmax   0.81    (--)   Valve area, Vmax   3.66 cm squared   (--)   Mitral valve   (Reference normals)   Peak E britni   57.6 cm/s   (--)   Peak A britni   67.2 cm/s   (--)   Peak E/A ratio   0.86    (--)     Prepared and electronically signed by     ALEX LOPEZ MD   Signed                  Assessment and recommendations:    #1. Autonomic dysfunction: Continues to have low BPs  "(systolic 90s) and \"brain fog\" which is worse in the mornings. Has occasional higher BP (systolic 150-170s) later in the day or the evening.   1. Try to drink 50-60 ounces of 50/50 electrolyte fluids/water mix per day.  Examples: Propel or Pedialyte. Gatorade and Powerade are higher in sugar/calories and are okay in moderation or if you are an athlete.   2. Increase Fludrocortisone to 0.2 mg daily: Take daily in conjunction with a high-salt diet and adequate fluid intake. Doses exceeding 0.3 mg daily may predispose patient to unwanted side effects (ie HTN, hypokalemia). Use caution in patients with HF, MI, DM. Taper if wish discontinuation of therapy.   4. Isometric exercises.  These exercises will assist in increasing intravascular pressure.   They also include swimming, rowing, recumbent bike.   5. Try to eat six small meals per day. Try to keep these meals low in carbohydrates and low in gluten.   6. Bike compression shorts or Spanx- use as much as possible, especially when standing for longer periods of time.   7. During hot summer hours, try to stay in cool -air conditioned climates.   8. Echocardiogram to evaluate function of the heart.   9. Her work up for mast cell activation was normal.     #2. ILR reached RRT on 4/15/2018  1. Implantable loop recorder explantation discussed with patient. The risks and benefits of the procedure were explained to the patient in full.  The risks of the procedure include, but are not limited to: pain, bleeding, blood transfusion reactions and infection. The patient would like to proceed with explantation and is cleared for this procedure.     #3. Sinus tachycardia/palpitations: symptomatic with palpitations during sinus tachycardia. Has occasional hypertension later in the day/evening.    1. Propranolol XL 80 mg once daily       FOLLOW UP:  Follow up in 3 months with Dr. Russell or follow up sooner as needed for symptoms    Patient expresses understanding and agreement with " the plan.    I appreciate the chance to help with Ada hernandez. Please let me know if you have any questions or concerns.    Tiara KITCHEN, KEN-C

## 2018-06-07 NOTE — NURSING NOTE
Chief Complaint   Patient presents with     Follow Up For     orthostats     Vitals were taken and medications were reconciled.     Socorro Houston RMA  10:58 AM

## 2018-06-07 NOTE — PROGRESS NOTES
"    Clinical Cardiac Electrophysiology  [facility] [place]    HPI: Ms. Scar Welch is a 47 year-old female who presents with her daughter for follow up of autonomic dysfuction.  The patient has a past medical history significant for dysautonomia, Tom-Danlos, Chiari malformation, eosinophilic esophagitis, and gastroparesis. Since 2014, she notes that her heart rates have fluctuated from as low as 40 to as high as 150 bpm and her BP would also fluctuate at the same time without known aggravating factors. Fluctuations of HR happen when she is supine, sitting, standing, walking, etc and symptoms associated with the fluctuations include LH and dizziness. She had two episodes of blacking out for a few seconds with a prodrome of LH, dizziness, nausea, diaphoresis, black spots in vision before the episodes and was noted to be pale by her . She saw Dr. Tello with these complaints and patient had a ILR placed 8/2017. Patient has several signs of autonomic dysfunction including HR elevation with positional changes found with orthostats in clinic 10/5/2017 and HR elevation to the 170's on ILR. However, there are some atypical features such as HR elevation at home in any position (supine or standing). Given poor sensing of ILR, a Cardionet was done 10/5/2017-10/14/2017 and showed sinus rhythm and frequent sinus tachycardia correlating with symptoms of dyspnea and racing heart.  A repeat tilt table was done 10/20/2017 and was normal.  She recently failed midodrine as the patient stated that it caused her blood pressures to drop \"very low\" in the mornings after taking it. She was subsequently started on fludrocortisone 0.1 mg tablet daily. Her ILR is at RRT.     Patient states that never misses doses of medication. Continues to have low BPs (systolic 90s) and \"brain fog\" which is worse in the mornings. Has occasional higher BP (systolic 150-170s) later in the day or the evening.  She states that she is symptomatic with palpitations " during sinus tachycardia, notes difficulty sleeping when HRs are in the 160s-170s at night.  Has palpitations off and on most days of the week.  States that activity level is light, she is trying to be more active.  She is drinking water/propol mix 60 oz daily.  Denies chest pain or pressure, syncope, dyspnea at rest or with exertion, orthopnea, PND, abdominal edema, pedal edema, or claudication.     Orthostatic BP: supine 103/74 HR 73 tired, sitting immediate 103/79 HR 77 tired, standing immediate 14/83 tired, standing 1 minute 105/84 HR 88 weak, standing 2 minute 105/77 HR 79 exhausted and weak.     Remote transmission 5/9/2018:     Current Outpatient Prescriptions   Medication Sig Dispense Refill     albuterol (PROAIR HFA/PROVENTIL HFA/VENTOLIN HFA) 108 (90 BASE) MCG/ACT Inhaler Inhale 2 puffs into the lungs       amitriptyline (ELAVIL) 10 MG tablet Take 3 tablets (30 mg) by mouth At Bedtime 270 tablet 3     buprenorphine (BUTRANS) 10 MCG/HR WK patch Place 1 patch onto the skin every 7 days 4 patch 2     butalbital-acetaminophen-caffeine (FIORICET/ESGIC) -40 MG per tablet TAKE ONE TABLET BY MOUTH EVERY 4 HOURS AS NEEDED  0     cetirizine (ZYRTEC) 10 MG tablet Take 10 mg by mouth daily        COMPRESSION STOCKINGS 1 each daily 20-30 mmHg Thigh Length measure and fit, color and style per patient preference. Doff n jose per need. 3 each 3     DULoxetine (CYMBALTA) 30 MG EC capsule Take 1 capsule (30 mg) by mouth daily 60 capsule 1     eletriptan (RELPAX) 20 MG tablet Take 1 tablet (20 mg) by mouth at onset of headache for migraine May repeat in 2 hours. Max 4 tablets/24 hours. 18 tablet 1     EPINEPHrine (EPIPEN/ADRENACLICK/OR ANY BX GENERIC EQUIV) 0.3 MG/0.3ML injection 2-pack Inject 0.3 mg into the muscle       famotidine (PEPCID) 20 MG tablet Take 1 tablet (20 mg) by mouth At Bedtime 90 tablet 1     fludrocortisone (FLORINEF) 0.1 MG tablet Take 1 tablet (0.1 mg) by mouth daily 30 tablet 3     fluticasone  (FLONASE) 50 MCG/ACT spray INSTILL 2 SPRAYS INTO BOTH NOSTRILS DAILY 16 mL 3     fluticasone-salmeterol (ADVAIR DISKUS) 250-50 MCG/DOSE diskus inhaler Inhale 1 puff into the lungs 2 times daily as needed        levothyroxine (SYNTHROID) 25 MCG tablet Take 25 mcg by mouth daily       linaclotide (LINZESS) 145 MCG capsule Take 1 capsule (145 mcg) by mouth every morning (before breakfast) Last refill until GI clinic follow up. 30 capsule 0     metoclopramide (REGLAN) 5 MG tablet Take 1-2 tabs po Q 6 hrs as needed for nausea 60 tablet 3     Multiple Vitamins-Minerals (MULTIVITAMIN PO) Take by mouth daily       omeprazole (PRILOSEC) 20 MG CR capsule Take 1 capsule (20 mg) by mouth daily 90 capsule 2     ondansetron (ZOFRAN-ODT) 4 MG ODT tab Take 1-2 tablets (4-8 mg) by mouth every 12 hours as needed for nausea 60 tablet 1     ONE DAILY MULTIPLE VITAMIN OR        oxyCODONE IR (ROXICODONE) 5 MG tablet Take 0.5 tablets (2.5 mg) by mouth every 6 hours as needed for severe pain 15 tablet 0     Probiotic Product (PROBIOTIC DAILY PO) Take by mouth 2 times daily 2 packets daily       rizatriptan (MAXALT-MLT) 5 MG ODT tab TAKE 1-2 TABLETS (5-10 MG) BY MOUTH AT ONSET OF HEADACHE FOR MIGRAINE (MAX 30 MG IN 24 HOURS)  6     SUMAtriptan (IMITREX STATDOSE) 6 MG/0.5ML pen injector kit Inject 0.5 mLs (6 mg) Subcutaneous at onset of headache for migraine (may repeat once after 2 hrs) May repeat in 1 hour. Max 12 mg/24 hours. 2 kit 1     tiZANidine (ZANAFLEX) 4 MG tablet Take 1 tablet (4 mg) by mouth 3 times daily as needed for muscle spasms 90 tablet 3     traMADol (ULTRAM) 50 MG tablet Take 1 tablet (50 mg) by mouth every 6 hours as needed for pain maximum 4 tablet(s) per day 15 tablet 1     vitamin D (ERGOCALCIFEROL) 34097 UNIT capsule        zonisamide (ZONEGRAN) 25 MG capsule Take 125mg daily for 2 weeks, then 150mg 360 capsule 3       Past Medical History:   Diagnosis Date     Allergic rhinitis 09/2007     Arthritis 11/01/2013     Found during search for cause of back pain     Autoimmune disease (H) 2016    Immunosuppresive     Bleeding disorder (H) 2016    Bruise easily     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     Chronic sinusitis 00/2007    Diagnosed with chronic pansinusitis 2016     Chronic tonsillitis 1980    Had tonsils removed 02/1981, rheumatic fever     Tom-Danlos disease      Gastroesophageal reflux disease 3/1/2017    I have large hiatal hernia     Hernia, abdominal 10/2016    Hiatal Hernia     Hiatal hernia 2016    Have adeline hiatel hernia     Hoarseness Unsure    It comes and goes     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 Remove screws/rods     BIOPSY  01/01/2008 & 3/2017    mole biopsy, lymph node biopsy     COLONOSCOPY  3/2017    Colonoscopy and GI     NASAL/SINUS POLYPECTOMY  2017    Polyps were benign     TONSILLECTOMY       TONSILLECTOMY  1981     TUBAL LIGATION         Family History   Problem Relation Age of Onset     Connective Tissue Disorder Mother      eds     Connective Tissue Disorder Sister      eds     CANCER Father      Spinal tumor grew into spinal cord, went in brain     Migraines Father      DIABETES Maternal Grandmother      Elderly diabetes     HEART DISEASE Maternal Grandmother      Hypertension Maternal Grandmother      DIABETES Paternal Grandmother      Elderly  "diabetes     DIABETES Paternal Grandfather      Elderly diabetes     Asthma Sister      Pediatric Asthma     Migraines Sister      Asthma Son      Pediatric Asthma     Thyroid Disease Sister      ,     Migraines Sister      Migraines Sister      Breast Cancer No family hx of        Social History   Substance Use Topics     Smoking status: Former Smoker     Packs/day: 0.20     Years: 10.00     Types: Cigarettes     Start date: 1/1/1991     Quit date: 12/1/2013     Smokeless tobacco: Never Used      Comment: I somked on and off during specified dates.     Alcohol use Yes      Comment: Less than 5 drinks per year       Allergies   Allergen Reactions     Bee Venom Anaphylaxis, Difficulty breathing, Palpitations, Shortness Of Breath and Visual Disturbance     Patient does carry Epi-Pen     No Clinical Screening - See Comments Anaphylaxis     Chicken-Derived Products (Egg) Diarrhea and Nausea     Patient will self monitor  Other reaction(s): Nausea  Patient will self monitor  Other reaction(s): GI intolerance     Gabapentin      Gluten Meal      Lactose Dermatitis and Diarrhea     Milk Digestant Nausea     Patient is aware of milk sensitivity, will manage own diet     Milk Protein Extract      Other reaction(s): GI intolerance\  EGGS      Topiramate      Wheat Diarrhea     Wheat Bran Nausea     Patient will self monitor  Other reaction(s): Nausea  Patient will self monitor         ROS:   Negative except for as indicated in HPI.    Physical Examination:  Vitals: Ht 1.702 m (5' 7\")  Wt 91 kg (200 lb 9.6 oz)  BMI 31.42 kg/m2  BMI= Body mass index is 31.42 kg/(m^2).    GENERAL APPEARANCE: healthy, alert, and no acute distress  HEENT: no icterus, no xanthelasmas, normal pupil size and reaction, no cyanosis.  NECK: no asymmetry, no cervical bruits, no JVD   CHEST: lungs clear to auscultation - no rales, rhonchi or wheezes, no use of accessory muscles, no retractions, respirations are unlabored, normal respiratory " rate  CARDIOVASCULAR: regular rhythm, normal S1 with physiologic split S2, no S3 or S4 and no murmur, click or rub  ABDOMEN:  no abdominal bruits, soft, non-tender  EXTREMITIES: no clubbing, cyanosis, or edema  NEURO: alert and oriented to person/place/time, normal speech, gait and affect. Wearing a C collar.   VASC: Radial and posterior tibialis pulses +2 and symmetric bilaterally.   SKIN: no ecchymoses, no rashes    Laboratory:  Lab Results   Component Value Date    WBC 6.1 10/20/2017    RBC 4.29 10/20/2017    HGB 13.4 10/20/2017    HCT 38.8 10/20/2017    MCV 90 10/20/2017    MCH 31.2 10/20/2017    MCHC 34.5 10/20/2017    RDW 12.5 10/20/2017     10/20/2017     Lab Results   Component Value Date     03/02/2018    POTASSIUM 3.8 03/02/2018    CHLORIDE 105 03/02/2018    CO2 24 03/02/2018    ANIONGAP 9 03/02/2018    GLC 89 03/02/2018    BUN 9 03/02/2018    CR 0.88 03/02/2018    GFRESTIMATED 69 03/02/2018    GFRESTBLACK 84 03/02/2018    JUAN 8.9 03/02/2018      Lab Results   Component Value Date    INR 1.01 07/25/2017     No results found for: CKTOTAL, CKMB, TROPN  Cholesterol (mg/dL)   Date Value   04/26/2018 244 (H)     HDL Cholesterol (mg/dL)   Date Value   04/26/2018 48 (L)     LDL Cholesterol Calculated (mg/dL)   Date Value   04/26/2018 164 (H)       ECHO:   ECHOCARDIOGRAM 2016:   Location: [facility]   Room:   Status:OutPatient   Study time: 8 42     Allergies: NO KNOWN ALLRGY, NO LATEX ALGY     Reading Physician:  ALEX LOPEZ MD   Referring Physician:  ALEX LOPEZ MD   Adult Cardiac Sonographer:  SONY SOLIS RDCS     Indications: Tachycardia.     Procedure: The transthoracic approach was used. The study included complete 2D   imaging, M-mode, complete spectral Doppler, and color Doppler.     Summary:   Left ventricle: Size was normal. Systolic function was normal by visual   assessment. Ejection fraction was estimated to be 55 %. There were no regional   wall motion  abnormalities. Wall thickness was normal. Doppler parameters were   consistent with abnormal left ventricular relaxation (mild diastolic   dysfunction).   Aortic valve, LVOT: The valve was tricuspid. Aortic cusps demonstrated normal in   thickness and normal cuspal separation. There was no stenosis. There was no   regurgitation.   Mitral valve: Valve structure was normal. There was no evidence for stenosis.   There was no regurgitation.   Left atrium: Size was normal.   Right ventricle: The size was normal. Systolic function was normal. Wall   thickness was normal.   Tricuspid valve: There was no significant regurgitation.   Right atrium: Size was normal.   Pericardium, pleura: There was no pericardial effusion.     Comparisons:   There has been no significant interval change. Comparison was made with the   previous study of 05-Aug-2008.     Left ventricle: Size was normal. Systolic function was normal by visual   assessment. Ejection fraction was estimated to be 55 %. There were no regional   wall motion abnormalities. Wall thickness was normal. Doppler: Doppler   parameters were consistent with abnormal left ventricular relaxation (mild   diastolic dysfunction).     Aortic valve: The valve was tricuspid. Aortic cusps demonstrated normal in   thickness and normal cuspal separation. Doppler: Transaortic velocity was   within the normal range. There was no stenosis. There was no regurgitation.     Aorta: The root exhibited normal size. The ascending aorta was normal in size.     Mitral valve: Valve structure was normal. There was normal leaflet separation.   Doppler: The transmitral velocity was within the normal range. There was no   evidence for stenosis. There was no regurgitation.     Left atrium: Size was normal.     Atrial septum: There was no shunting across the atrial septum.     Right ventricle: The size was normal. Systolic function was normal. Wall   thickness was normal.     Pulmonic valve: The valve  "structure was normal. Doppler: There was no   significant regurgitation.     Tricuspid valve: Doppler: There was no significant regurgitation.     Right atrium: Size was normal.     Systemic veins: IVC: The inferior vena cava was normal in size and course.   Respirophasic changes were normal.     Pericardium: There was no pericardial effusion. The pericardium was normal in   appearance.     Measurement tables     2D measurements   Left ventricle   (Reference normals)   LVID ed, PLAX   39 mm   (35-60)   LVID es, PLAX   23 mm   (21-40)   FS, PLAX   41.03 %   (25-46)   LVPW thickness ed   11 mm   (6-11)   Ratio, IVS/LVPW   0.91    (<1.3)   Ventricular septum   (Reference normals)   IVS thickness ed   10 mm   (6-11)   LVOT   (Reference normals)   Diam   24 mm   (--)   Left atrium   (Reference normals)   Vol index    13.5 cc/m squared   (11-38)     M-mode measurements   Aorta   (Reference normals)   Root diam ed   37 mm   (20-37)     Doppler measurements   LVOT   (Reference normals)   Peak britni   83 cm/s   (--)   VTI   14.6 cm   (--)   Stroke vol   66.05 ml   (--)   Aortic valve   (Reference normals)   Peak britni   103 cm/s   (--)   VTI   19.33 cm   (--)   DSI, VTI   0.76    (--)   Valve area, VTI   3.44 cm squared   (--)   Obstr index, Vmax   0.81    (--)   Valve area, Vmax   3.66 cm squared   (--)   Mitral valve   (Reference normals)   Peak E britni   57.6 cm/s   (--)   Peak A britni   67.2 cm/s   (--)   Peak E/A ratio   0.86    (--)     Prepared and electronically signed by     ALEX LOPEZ MD   Signed                  Assessment and recommendations:    #1. Autonomic dysfunction: Continues to have low BPs (systolic 90s) and \"brain fog\" which is worse in the mornings. Has occasional higher BP (systolic 150-170s) later in the day or the evening.   1. Try to drink 50-60 ounces of 50/50 electrolyte fluids/water mix per day.  Examples: Propel or Pedialyte. Gatorade and Powerade are higher in sugar/calories and are okay in " moderation or if you are an athlete.   2. Increase Fludrocortisone to 0.2 mg daily: Take daily in conjunction with a high-salt diet and adequate fluid intake. Doses exceeding 0.3 mg daily may predispose patient to unwanted side effects (ie HTN, hypokalemia). Use caution in patients with HF, MI, DM. Taper if wish discontinuation of therapy.   4. Isometric exercises.  These exercises will assist in increasing intravascular pressure.   They also include swimming, rowing, recumbent bike.   5. Try to eat six small meals per day. Try to keep these meals low in carbohydrates and low in gluten.   6. Bike compression shorts or Spanx- use as much as possible, especially when standing for longer periods of time.   7. During hot summer hours, try to stay in cool -air conditioned climates.   8. Echocardiogram to evaluate function of the heart.   9. Her work up for mast cell activation was normal.     #2. ILR reached RRT on 4/15/2018  1. Implantable loop recorder explantation discussed with patient. The risks and benefits of the procedure were explained to the patient in full.  The risks of the procedure include, but are not limited to: pain, bleeding, blood transfusion reactions and infection. The patient would like to proceed with explantation and is cleared for this procedure.     #3. Sinus tachycardia/palpitations: symptomatic with palpitations during sinus tachycardia. Has occasional hypertension later in the day/evening.    1. Propranolol XL 80 mg once daily       FOLLOW UP:  Follow up in 3 months with Dr. Russell or follow up sooner as needed for symptoms    Patient expresses understanding and agreement with the plan.    I appreciate the chance to help with [place] [facility] Rebek[facility] care. Please let me know if you have any questions or concerns.    Tiara KITCHEN, NP-C

## 2018-06-07 NOTE — MR AVS SNAPSHOT
After Visit Summary   6/7/2018    Ada Welch    MRN: [id number]           Patient Information     Date Of Birth          1970        Visit Information        Provider Department      6/7/2018 11:00 AM Tiara Fletcher APRN CNP [facility]        Today's Diagnoses     Pre-syncope    -  1    Autonomic dysfunction        Sinus tachycardia          Care Instructions    Cardiology Provider you saw in clinic today: Tiara KITCHEN, NP-C    Medication Changes:    1.  Increase Fludrocortisone to 0.2 mg daily: Take daily in conjunction with a high-salt diet and adequate fluid intake.  2.  Start Inderal (propranolol)  XL 80 mg once daily for sinus tachycardia.     Labs/Tests needed:    1. Echocardiogram to evaluate the pumping function of your heart.   2. Explant of loop recorder - we will call to set up this procedure.     Follow-up Visit:    1. Follow up with Dr. Russell in 3 months    Further Instructions:    Continue  to drink 50-60 ounces of 50/50 electrolyte fluids/water mix per day.  Examples: Propel or Pedialyte.  sometric exercises.  These exercises will assist in increasing intravascular pressure.   They also include swimming, rowing, recumbent bike.   Try to eat six small meals per day. Try to keep these meals low in carbohydrates and low in gluten.   Bike compression shorts or Spanx- use as much as possible, especially when standing for longer periods of time.   During hot summer hours, try to stay in cool -air conditioned climates.     You will receive all normal lab and testing results via HomeMe.ru or mail if not reviewed in clinic today. Please contact our office if you need assistance with setting up Enviviohart.    If you need a medication refill please contact your pharmacy. Please allow 3 business days for your refill to be completed.     As always, thank you for trusting us with your health care needs!    If you have any questions regarding your visit please contact  your care team:   Cardiology  Telephone Number    EP RN  Electrophysiology Nurse Coordinator [phone]     Call for EP procedure scheduling concerns  BRIAN Lira  [phone]           Device Clinic (Pacemakers, ICDs, Loop)   RN's : Sierra Aguirre Connie, Dawn During business hours: [phone]    After business hours:   [phone]- select option 4 and ask for job code [id number].                  Follow-ups after your visit        Additional Services     Follow-Up with Electrophysiologist - 3 Months                 Your next 10 appointments already scheduled     Jun 12, 2018  2:40 PM CDT   (Arrive by 2:25 PM)   Return Visit with Stephen Xiong PA-C   [facility] ([facility])    [address] [place] [id number] [phone]            Jun 29, 2018 10:00 AM CDT   (Arrive by 9:45 AM)   Return Visit with Faiza Martinez MD   [facility] ([facility])    [address] [place] [id number] [phone]            Jul 11, 2018 11:15 AM CDT   (Arrive by 11:00 AM)   Return Visit with Wdae Singh MD   [facility] ([facility])    [address] [place] [id number] [phone]            Jul 23, 2018 12:15 PM CDT   (Arrive by 12:00 PM)   New Allergy with Vahe Rodriguez MD   [facility] ([facility])    [address] [place] [id number] [phone]           Do not take anti-histamines or Zantac for seven day prior to your appointment.            Aug 08, 2018  4:20 PM CDT   (Arrive by 4:05 PM)   Return Visit with Juana Griffith MD   [facility] ([facility])    [address] [place] [address]  Floor  [facility] [address]   [phone]            Sep 13, 2018  1:50 PM CDT   (Arrive by 1:35 PM)   RETURN ARRHYTHMIA with Kirk Russell MD   [facility] ([facility])    [address]  [address]  [facility] [address]   [phone]            Oct 25, 2018  9:25 AM CDT   (Arrive by 9:10 AM)   Return Visit with Ellyn Rivera MD   [facility] ([facility])    [address]  4th Floor  [facility] [address]   [phone]              Future tests that were ordered for you today     Open Future Orders        Priority Expected Expires Ordered    Follow-Up with Electrophysiologist - 3 Months Routine 9/5/2018 12/4/2018 6/7/2018    Echocardiogram Complete Routine 6/7/2018 6/7/2019 6/7/2018    EP ICD/Pacemaker/Loop Recorder Routine  6/7/2019 6/7/2018            Who to contact     If you have questions or need follow up information about today's clinic visit or your schedule please contact [facility] directly at [phone].  Normal or non-critical lab and imaging results will be communicated to you by MyChart, letter or phone within 4 business days after the clinic has received the results. If you do not hear from us within 7 days, please contact the clinic through Retrofithart or phone. If you have a critical or abnormal lab result, we will notify you by phone as soon as possible.  Submit refill requests through WeMontage or call your pharmacy and they will forward the refill request to us. Please allow 3 business days for your refill to be completed.          Additional Information About Your Visit        Retrofithart Information     WeMontage gives you secure access to your electronic health record. If you see a primary care provider, you can also send messages to your care team and make appointments. If you have questions, please call your primary care clinic.  If you do not have a primary care provider,  "please call [phone] and they will assist you.        Care EveryWhere ID     This is your Care EveryWhere ID. This could be used by other organizations to access your [place] medical records  [id number]        Your Vitals Were     Pulse Height Pulse Oximetry BMI (Body Mass Index)          79 1.702 m (5' 7\") 100% 31.42 kg/m2         Blood Pressure from Last 3 Encounters:   06/07/18 105/77   06/04/18 127/90   05/24/18 105/70    Weight from Last 3 Encounters:   06/07/18 91 kg (200 lb 9.6 oz)   06/04/18 89.8 kg (198 lb)   05/18/18 90 kg (198 lb 6.4 oz)              We Performed the Following     Follow-Up with EP Cardiac  Advanced Practice Provider- 3 Months          Today's Medication Changes          These changes are accurate as of 6/7/18 12:14 PM.  If you have any questions, ask your nurse or doctor.               Start taking these medicines.        Dose/Directions    Propranolol HCl SR Beads 80 MG Cp24   Used for:  Sinus tachycardia   Started by:  Tiara Fletcher APRN CNP        Dose:  80 mg   Take 80 mg by mouth daily   Quantity:  90 capsule   Refills:  3         These medicines have changed or have updated prescriptions.        Dose/Directions    fludrocortisone 0.1 MG tablet   Commonly known as:  FLORINEF   This may have changed:  how much to take   Used for:  Autonomic dysfunction, Pre-syncope   Changed by:  Tiara Fletcher APRN CNP        Dose:  0.2 mg   Take 2 tablets (0.2 mg) by mouth daily   Quantity:  180 tablet   Refills:  3            Where to get your medicines      These medications were sent to [place] [address] [place] [facility] [place] - [address]  [address] [place] [address]     Phone:  [phone]     fludrocortisone 0.1 MG tablet    Propranolol HCl SR Beads 80 MG Cp24                Primary Care Provider Office Phone # Fax #    Ellyn Rivera [phone] [address] [address]        Equal Access to Services     [facility] " GAAR : Hadii aad ku hadoskar Garzonali, waaxda luqadaha, qaybta kaalmada adeclement, odette jerryin hayaan martyalan chino donis . So [facility] [phone].    ATENCIÓN: Si habla español, tiene a sweeney disposición servicios gratuitos de asistencia lingüística. Llame al [phone].    We comply with applicable federal civil rights laws and [place] laws. We do not discriminate on the basis of race, color, national origin, age, disability, sex, sexual orientation, or gender identity.            Thank you!     Thank you for choosing [facility]  for your care. Our goal is always to provide you with excellent care. Hearing back from our patients is one way we can continue to improve our services. Please take a few minutes to complete the written survey that you may receive in the mail after your visit with us. Thank you!             Your Updated Medication List - Protect others around you: Learn how to safely use, store and throw away your medicines at www.disposemymeds.org.          This list is accurate as of 6/7/18 12:14 PM.  Always use your most recent med list.                   Brand Name Dispense Instructions for use Diagnosis    ADVAIR DISKUS 250-50 MCG/DOSE diskus inhaler   Generic drug:  fluticasone-salmeterol      Inhale 1 puff into the lungs 2 times daily as needed        albuterol 108 (90 Base) MCG/ACT Inhaler    PROAIR HFA/PROVENTIL HFA/VENTOLIN HFA     Inhale 2 puffs into the lungs        amitriptyline 10 MG tablet    ELAVIL    270 tablet    Take 3 tablets (30 mg) by mouth At Bedtime    Chronic migraine without aura without status migrainosus, not intractable       buprenorphine 10 MCG/HR WK patch    BUTRANS    4 patch    Place 1 patch onto the skin every 7 days    Postlaminectomy syndrome of lumbar region       butalbital-acetaminophen-caffeine -40 MG per tablet    FIORICET/ESGIC     TAKE ONE TABLET BY MOUTH EVERY 4 HOURS AS NEEDED        cetirizine 10 MG tablet    zyrTEC     Take 10 mg by mouth  daily        COMPRESSION STOCKINGS     3 each    1 each daily 20-30 mmHg Thigh Length measure and fit, color and style per patient preference. Antolin garcia per need.    Orthostatic hypotension       DULoxetine 30 MG EC capsule    CYMBALTA    60 capsule    Take 1 capsule (30 mg) by mouth daily    Lumbar post-laminectomy syndrome       eletriptan 20 MG tablet    RELPAX    18 tablet    Take 1 tablet (20 mg) by mouth at onset of headache for migraine May repeat in 2 hours. Max 4 tablets/24 hours.    Chronic daily headache, Intractable migraine without aura and with status migrainosus       EPINEPHrine 0.3 MG/0.3ML injection 2-pack    EPIPEN/ADRENACLICK/or ANY BX GENERIC EQUIV     Inject 0.3 mg into the muscle        famotidine 20 MG tablet    PEPCID    90 tablet    Take 1 tablet (20 mg) by mouth At Bedtime    Hiatal hernia, Mast cell disorder       fludrocortisone 0.1 MG tablet    FLORINEF    180 tablet    Take 2 tablets (0.2 mg) by mouth daily    Autonomic dysfunction, Pre-syncope       fluticasone 50 MCG/ACT spray    FLONASE    16 mL    INSTILL 2 SPRAYS INTO BOTH NOSTRILS DAILY    Chronic rhinitis, unspecified type       linaclotide 145 MCG capsule    LINZESS    30 capsule    Take 1 capsule (145 mcg) by mouth every morning (before breakfast) Last refill until GI clinic follow up.    Chronic idiopathic constipation       metoclopramide 5 MG tablet    REGLAN    60 tablet    Take 1-2 tabs po Q 6 hrs as needed for nausea        MULTIVITAMIN PO      Take by mouth daily        omeprazole 20 MG CR capsule    priLOSEC    90 capsule    Take 1 capsule (20 mg) by mouth daily    Hiatal hernia       ondansetron 4 MG ODT tab    ZOFRAN-ODT    60 tablet    Take 1-2 tablets (4-8 mg) by mouth every 12 hours as needed for nausea    Migraine without status migrainosus, not intractable, unspecified migraine type       ONE DAILY MULTIPLE VITAMIN OR           oxyCODONE IR 5 MG tablet    ROXICODONE    15 tablet    Take 0.5 tablets (2.5 mg)  by mouth every 6 hours as needed for severe pain    Postlaminectomy syndrome       PROBIOTIC DAILY PO      Take by mouth 2 times daily 2 packets daily        Propranolol HCl SR Beads 80 MG Cp24     90 capsule    Take 80 mg by mouth daily    Sinus tachycardia       rizatriptan 5 MG ODT tab    MAXALT-MLT     TAKE 1-2 TABLETS (5-10 MG) BY MOUTH AT ONSET OF HEADACHE FOR MIGRAINE (MAX 30 MG IN 24 HOURS)        SUMAtriptan 6 MG/0.5ML pen injector kit    IMITREX STATDOSE    2 kit    Inject 0.5 mLs (6 mg) Subcutaneous at onset of headache for migraine (may repeat once after 2 hrs) May repeat in 1 hour. Max 12 mg/24 hours.    Chronic migraine without aura without status migrainosus, not intractable       SYNTHROID 25 MCG tablet   Generic drug:  levothyroxine      Take 25 mcg by mouth daily        tiZANidine 4 MG tablet    ZANAFLEX    90 tablet    Take 1 tablet (4 mg) by mouth 3 times daily as needed for muscle spasms    Post laminectomy syndrome       traMADol 50 MG tablet    ULTRAM    15 tablet    Take 1 tablet (50 mg) by mouth every 6 hours as needed for pain maximum 4 tablet(s) per day    Lumbar post-laminectomy syndrome       vitamin D 61803 UNIT capsule    ERGOCALCIFEROL          zonisamide 25 MG capsule    Zonegran    360 capsule    Take 125mg daily for 2 weeks, then 150mg    Occipital neuritis

## 2018-06-07 NOTE — TELEPHONE ENCOUNTER
Discussed MRI cine findings and earlier reviewed with Balbina; no issue across the foramen  Magnum . Balbina doesn't feel she has surgical issur; now seeing [facility]

## 2018-06-07 NOTE — TELEPHONE ENCOUNTER
[facility] Call Center    Phone Message    May a detailed message be left on voicemail: no    Reason for Call: Other: Please call back     Action Taken: Message routed to:  [facility] fracisco

## 2018-06-07 NOTE — PATIENT INSTRUCTIONS
Cardiology Provider you saw in clinic today: Tiara KITCHEN, NP-C    Medication Changes:    1.  Increase Fludrocortisone to 0.2 mg daily: Take daily in conjunction with a high-salt diet and adequate fluid intake.  2.  Start Inderal (propranolol)  XL 80 mg once daily for sinus tachycardia.     Labs/Tests needed:    1. Echocardiogram to evaluate the pumping function of your heart.   2. Explant of loop recorder - we will call to set up this procedure.     Follow-up Visit:    1. Follow up with Dr. Russell in 3 months    Further Instructions:    Continue  to drink 50-60 ounces of 50/50 electrolyte fluids/water mix per day.  Examples: Propel or Pedialyte.  sometric exercises.  These exercises will assist in increasing intravascular pressure.   They also include swimming, rowing, recumbent bike.   Try to eat six small meals per day. Try to keep these meals low in carbohydrates and low in gluten.   Bike compression shorts or Spanx- use as much as possible, especially when standing for longer periods of time.   During hot summer hours, try to stay in cool -air conditioned climates.     You will receive all normal lab and testing results via Fliplifehart or mail if not reviewed in clinic today. Please contact our office if you need assistance with setting up MyChart.    If you need a medication refill please contact your pharmacy. Please allow 3 business days for your refill to be completed.     As always, thank you for trusting us with your health care needs!    If you have any questions regarding your visit please contact your care team:   Cardiology  Telephone Number    EP RN  Electrophysiology Nurse Coordinator [phone]     Call for EP procedure scheduling concerns  BRIAN Lira  [phone]           Device Clinic (Pacemakers, ICDs, Loop)   RN's : Sierra Aguirre Connie, Dawn During business hours: [phone]    After business hours:   [phone]- select option 4 and ask for job code [id number].

## 2018-06-08 ENCOUNTER — TELEPHONE (OUTPATIENT)
Dept: CARDIOLOGY | Facility: CLINIC | Age: 48
End: 2018-06-08

## 2018-06-08 DIAGNOSIS — M96.1 POST LAMINECTOMY SYNDROME: ICD-10-CM

## 2018-06-08 NOTE — TELEPHONE ENCOUNTER
Writer attempted to contact patient to schedule a loop recorder explant. A message was left on the voicemail with the callback number.    Arlyn Amezcua  Periop Electrophysiology   [phone]

## 2018-06-12 NOTE — TELEPHONE ENCOUNTER
Yes we have the form, will discussed with Dr. Rivera about the form and the next step. Called Law firm to informed them we have received the form.     Claudia 15, 2018 2:37 PM  Discussed with Dr. Rivera about the form. We are not able to complete this form because we are not able to do the functional testing in clinic. Patient needs to see a disability doctor to get this done.     Called Shana back to update her about this.

## 2018-06-27 ENCOUNTER — TELEPHONE (OUTPATIENT)
Dept: ANESTHESIOLOGY | Facility: CLINIC | Age: 48
End: 2018-06-27

## 2018-06-27 NOTE — TELEPHONE ENCOUNTER
[facility] Call Center    Phone Message    May a detailed message be left on voicemail: yes    Reason for Call: Other: Disability paperwork that was sent to the clinic in April and needs to be submitted to the  by Friday or pt's disability case will thrown out. The law office has been tryng to get this paper work for weeks. Please call her back ASAP!!!!!     Action Taken: Message routed to:  [facility] Pain Clinic

## 2018-06-28 NOTE — TELEPHONE ENCOUNTER
Call back to pt.  Pt reported that she is unaware of request for disability paperwork, stating that it might have been her 's office.      Pt is scheduled for f/u tomorrow and was advised to consult with Dr. Martinez regarding headache flare at visit.  Pt agreeable to plan.    Jessica De Souza RN, BSN

## 2018-06-29 ENCOUNTER — APPOINTMENT (OUTPATIENT)
Dept: MEDSURG UNIT | Facility: CLINIC | Age: 48
End: 2018-06-29
Attending: INTERNAL MEDICINE
Payer: COMMERCIAL

## 2018-06-29 ENCOUNTER — OFFICE VISIT (OUTPATIENT)
Dept: ANESTHESIOLOGY | Facility: CLINIC | Age: 48
End: 2018-06-29
Payer: MEDICARE

## 2018-06-29 ENCOUNTER — APPOINTMENT (OUTPATIENT)
Dept: CARDIOLOGY | Facility: CLINIC | Age: 48
End: 2018-06-29
Attending: NURSE PRACTITIONER
Payer: COMMERCIAL

## 2018-06-29 ENCOUNTER — HOSPITAL ENCOUNTER (OUTPATIENT)
Facility: CLINIC | Age: 48
Discharge: HOME | End: 2018-06-29
Attending: INTERNAL MEDICINE | Admitting: INTERNAL MEDICINE
Payer: COMMERCIAL

## 2018-06-29 VITALS
BODY MASS INDEX: 31.56 KG/M2 | HEART RATE: 69 BPM | WEIGHT: 201.06 LBS | RESPIRATION RATE: 18 BRPM | HEIGHT: 67 IN | OXYGEN SATURATION: 97 % | SYSTOLIC BLOOD PRESSURE: 115 MMHG | TEMPERATURE: 98.4 F | DIASTOLIC BLOOD PRESSURE: 76 MMHG

## 2018-06-29 VITALS
HEIGHT: 67 IN | HEART RATE: 74 BPM | RESPIRATION RATE: 16 BRPM | BODY MASS INDEX: 31.55 KG/M2 | WEIGHT: 201 LBS | DIASTOLIC BLOOD PRESSURE: 77 MMHG | SYSTOLIC BLOOD PRESSURE: 117 MMHG

## 2018-06-29 DIAGNOSIS — M96.1 POSTLAMINECTOMY SYNDROME: Primary | ICD-10-CM

## 2018-06-29 DIAGNOSIS — G90.9 AUTONOMIC DYSFUNCTION: ICD-10-CM

## 2018-06-29 PROCEDURE — 27210957 ZZH ACCESS EP PROC CR5

## 2018-06-29 PROCEDURE — 88300 SURGICAL PATH GROSS: CPT | Performed by: INTERNAL MEDICINE

## 2018-06-29 PROCEDURE — 40000166 ZZH STATISTIC PP CARE STAGE 1

## 2018-06-29 PROCEDURE — 33284 ZZHC LOOP RECORDER EXPLANT: CPT

## 2018-06-29 PROCEDURE — 93010 ELECTROCARDIOGRAM REPORT: CPT | Performed by: INTERNAL MEDICINE

## 2018-06-29 PROCEDURE — 25000128 H RX IP 250 OP 636: Performed by: NURSE PRACTITIONER

## 2018-06-29 PROCEDURE — 40000065 ZZH STATISTIC EKG NON-CHARGEABLE

## 2018-06-29 PROCEDURE — 33284 ZZHC LOOP RECORDER EXPLANT: CPT | Mod: GC | Performed by: INTERNAL MEDICINE

## 2018-06-29 PROCEDURE — 93005 ELECTROCARDIOGRAM TRACING: CPT

## 2018-06-29 RX ORDER — CEFAZOLIN SODIUM 2 G/100ML
2 INJECTION, SOLUTION INTRAVENOUS
Status: COMPLETED | OUTPATIENT
Start: 2018-06-29 | End: 2018-06-29

## 2018-06-29 RX ORDER — OXYCODONE HYDROCHLORIDE 5 MG/1
2.5 TABLET ORAL EVERY 6 HOURS PRN
Qty: 15 TABLET | Refills: 0 | Status: SHIPPED | OUTPATIENT
Start: 2018-06-29 | End: 2019-08-29

## 2018-06-29 RX ORDER — LIDOCAINE 40 MG/G
CREAM TOPICAL
Status: CANCELLED | OUTPATIENT
Start: 2018-06-29

## 2018-06-29 RX ORDER — LIDOCAINE 40 MG/G
CREAM TOPICAL
Status: DISCONTINUED | OUTPATIENT
Start: 2018-06-29 | End: 2018-06-29 | Stop reason: HOSPADM

## 2018-06-29 RX ORDER — TRAMADOL HYDROCHLORIDE 50 MG/1
50 TABLET ORAL EVERY 6 HOURS PRN
Qty: 15 TABLET | Refills: 0 | Status: SHIPPED | OUTPATIENT
Start: 2018-06-29 | End: 2018-07-12

## 2018-06-29 RX ADMIN — PROCHLORPERAZINE EDISYLATE 10 MG: 5 INJECTION INTRAMUSCULAR; INTRAVENOUS at 13:13

## 2018-06-29 RX ADMIN — CEFAZOLIN SODIUM 2 G: 2 INJECTION, SOLUTION INTRAVENOUS at 13:18

## 2018-06-29 ASSESSMENT — ANXIETY QUESTIONNAIRES
GAD7 TOTAL SCORE: 0
7. FEELING AFRAID AS IF SOMETHING AWFUL MIGHT HAPPEN: NOT AT ALL
3. WORRYING TOO MUCH ABOUT DIFFERENT THINGS: NOT AT ALL
GAD7 TOTAL SCORE: 0
2. NOT BEING ABLE TO STOP OR CONTROL WORRYING: NOT AT ALL
5. BEING SO RESTLESS THAT IT IS HARD TO SIT STILL: NOT AT ALL
6. BECOMING EASILY ANNOYED OR IRRITABLE: NOT AT ALL
7. FEELING AFRAID AS IF SOMETHING AWFUL MIGHT HAPPEN: NOT AT ALL
1. FEELING NERVOUS, ANXIOUS, OR ON EDGE: NOT AT ALL
4. TROUBLE RELAXING: NOT AT ALL

## 2018-06-29 ASSESSMENT — PAIN SCALES - GENERAL: PAINLEVEL: SEVERE PAIN (7)

## 2018-06-29 NOTE — PATIENT INSTRUCTIONS
1. Increase amitriptyline to 40mg at bedtime for two weeks, then increase to 50mg at bedtime.     2. Start taking 30mg of cymbalta in the morning, and continue taking 60mg at bedtime.     3. Tramadol and oxycodone refilled today.     Follow up: 3 months or sooner if needed       To speak with a nurse, schedule/reschedule/cancel a clinic appointment, or request a medication refill call: [phone]     You can also reach us by [employer]: [email or web address]    For refills, please call on Monday, 1 week before your medication runs out. The doctors are not always in clinic, so this gives us time to get your prescriptions ready.  Please let us know the name of the medication you are requesting a refill of.

## 2018-06-29 NOTE — PROGRESS NOTES
[facility] : Progress Note    Date of visit: 6/29/2018    Interval history:  Ada Welch is a 47 year old female with past medical history significant for Ehler Danlos syndrome is known to me for chronic  low back pain, which is mostly located at lower lumbar area. The patient has very complex history of low back pain for nearly 24 years for which she underwent L4-S1 fusion (TLIF) 12/09/2013.  Postoperatively her  back pain even became much worse than preop.  Mainly LBP, but also radiates down both legs S1 dermatome distribution.   She then had  removal of posterior instrumentation (screws and guadalupe) with exploration of fusion.  Fusion noted to already be solid.  NO significant relief afforded by this surgery. The pain has been progressively worsened, limiting the patient s activities, affecting mood  and sleep quality, and causing a decrease in the patient s quality of life.  She underwent multiple SUNSHINE's which were not helpful, and even made things worse.  Recently started aquatic based physical therapy. Previous EMG showed some L5 radiculopathy on left. Her pain significantly improved after we started her butrans patch. She continues to be more functional than before the butrans was prescribed. She is able to walk longer distances and do activities with less pain that before. She has numbness in her left 4th toe that has been constant since her surgery. She denies any significant side effects from the medications. She also denies any new bowel or bladder issues, new weakness or numbness.       The patient also reports headache, neck pain, back pain, bilateral lower extremity pain.  Patient has seen neurologist Dr. Galeano.  She had an EMG study performed by Dr. Pickard, which showed an L5 radiculopathy on the left.  A repeat EMG ordered by Dr. Galeano.  Dr. Galeano referred her to Dr. Noemy Nguyen, headache specialist.  During the  "last clinic visit we performed bilateral occipital nerve block, however she had no relief.    She has also been seeing Dr. Singh  for persistent UTI.  She has been seeing Dr. Moncada for chronic bilateral lower extremity pain.  Per Dr. 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  No surgery required for her current symptomatology.     She reports that recently her headache and neck pain  symptoms have been increased for which she has been taking intermittently tramadol  50 mg, approximately 15-30 tablets a month.  She has been also on amitriptyline 30 mg nightly, acetaminophen as needed, and tizanidine 8 mg as needed.           Recommendations/plan at the last visit included:  - Interventions: Will schedule her for occipital blocks.  SUNSHINE's and SCS and other options have been discussed with her but this would be a last resort as she feels her body \"rejects foreign objects\". Discussed about Botox injection for migraine headache if conservative therapies fail.   - Medications:  getting excellent benefit with butrans at this time and is on a stable dose.    -  Continue tizanidine 8mg to help with her muscle spasms.  Continue ibuprofen and ACETAMINOPHEN prn.  - Take Amitriptyline 30 mg at bedtime.  - Physical Therapy:  Continue as scheduled - she also does aquatic therapy - she will continue her HEP  - Psychology/Coping skills: continue therapy in [place]  - Diagnostic Studies:  None today          [place] Prescription Monitoring Program:   Reviewed. No concerns    Review of Systems:  The 14 system ROS was reviewed and was negative except what is documented above and as follows.  Any bowel or bladder problems: none  Mood: okay    Physical Exam:  Vitals:    06/29/18 0954   BP: 117/77   Pulse: 74   Resp: 16   Weight: 91.2 kg (201 lb)   Height: 1.702 m (5' 7\")       General: Awake in no apparent distress.   Eyes: Sclerae are anicteric. PERRLA, EOMI   Neck: supple, " no masses.   Lungs: unlabored.   Heart: regular rate and rhythm   Abdomen: soft non tender.  Extremities: Pulses are well palpable, no peripheral edema.   Musculoskeletal: 5/5 muscle strength in all extremities.   Neurologic exam:Sensation intact throughout all dermatomes bilateral upper extremities and lower extremities  Psychiatric; Normal affect.   Skin: Warm and Dry.    Medications:  Current Outpatient Prescriptions   Medication Sig Dispense Refill     albuterol (PROAIR HFA/PROVENTIL HFA/VENTOLIN HFA) 108 (90 BASE) MCG/ACT Inhaler Inhale 2 puffs into the lungs       amitriptyline (ELAVIL) 10 MG tablet Take 3 tablets (30 mg) by mouth At Bedtime 270 tablet 3     buprenorphine (BUTRANS) 10 MCG/HR WK patch Place 1 patch onto the skin every 7 days 4 patch 2     butalbital-acetaminophen-caffeine (FIORICET/ESGIC) -40 MG per tablet TAKE ONE TABLET BY MOUTH EVERY 4 HOURS AS NEEDED  0     cetirizine (ZYRTEC) 10 MG tablet Take 10 mg by mouth daily        COMPRESSION STOCKINGS 1 each daily 20-30 mmHg Thigh Length measure and fit, color and style per patient preference. Doshar n jose per need. 3 each 3     DULoxetine (CYMBALTA) 30 MG EC capsule Take 2 capsules (60 mg) by mouth daily 60 capsule 1     eletriptan (RELPAX) 20 MG tablet Take 1 tablet (20 mg) by mouth at onset of headache for migraine May repeat in 2 hours. Max 4 tablets/24 hours. 18 tablet 1     EPINEPHrine (EPIPEN/ADRENACLICK/OR ANY BX GENERIC EQUIV) 0.3 MG/0.3ML injection 2-pack Inject 0.3 mg into the muscle       famotidine (PEPCID) 20 MG tablet Take 1 tablet (20 mg) by mouth At Bedtime 90 tablet 1     fludrocortisone (FLORINEF) 0.1 MG tablet Take 2 tablets (0.2 mg) by mouth daily 180 tablet 3     fluticasone (FLONASE) 50 MCG/ACT spray INSTILL 2 SPRAYS INTO BOTH NOSTRILS DAILY 16 mL 3     fluticasone-salmeterol (ADVAIR DISKUS) 250-50 MCG/DOSE diskus inhaler Inhale 1 puff into the lungs 2 times daily as needed        levothyroxine (SYNTHROID) 25 MCG  tablet Take 25 mcg by mouth daily       linaclotide (LINZESS) 145 MCG capsule Take 1 capsule (145 mcg) by mouth every morning (before breakfast) Last refill until GI clinic follow up. 30 capsule 0     metoclopramide (REGLAN) 5 MG tablet Take 1-2 tabs po Q 6 hrs as needed for nausea 60 tablet 3     Multiple Vitamins-Minerals (MULTIVITAMIN PO) Take by mouth daily       omeprazole (PRILOSEC) 20 MG CR capsule Take 1 capsule (20 mg) by mouth daily 90 capsule 2     ondansetron (ZOFRAN-ODT) 4 MG ODT tab Take 1-2 tablets (4-8 mg) by mouth every 12 hours as needed for nausea 60 tablet 1     ONE DAILY MULTIPLE VITAMIN OR        oxyCODONE IR (ROXICODONE) 5 MG tablet Take 0.5 tablets (2.5 mg) by mouth every 6 hours as needed for severe pain 15 tablet 0     Probiotic Product (PROBIOTIC DAILY PO) Take by mouth 2 times daily 2 packets daily       Propranolol HCl SR Beads 80 MG CP24 Take 80 mg by mouth daily 90 capsule 3     rizatriptan (MAXALT-MLT) 5 MG ODT tab TAKE 1-2 TABLETS (5-10 MG) BY MOUTH AT ONSET OF HEADACHE FOR MIGRAINE (MAX 30 MG IN 24 HOURS)  6     SUMAtriptan (IMITREX STATDOSE) 6 MG/0.5ML pen injector kit Inject 0.5 mLs (6 mg) Subcutaneous at onset of headache for migraine (may repeat once after 2 hrs) May repeat in 1 hour. Max 12 mg/24 hours. 2 kit 1     tiZANidine (ZANAFLEX) 4 MG tablet Take 1 tablet (4 mg) by mouth 3 times daily as needed for muscle spasms 90 tablet 3     traMADol (ULTRAM) 50 MG tablet Take 1 tablet (50 mg) by mouth every 6 hours as needed for pain maximum 4 tablet(s) per day 15 tablet 1     vitamin D (ERGOCALCIFEROL) 02591 UNIT capsule        zonisamide (ZONEGRAN) 25 MG capsule Take 125mg daily for 2 weeks, then 150mg 360 capsule 3         LABORATORY VALUES:   Recent Labs   Lab Test  03/02/18   1340  10/20/17   1150   NA  138  138   POTASSIUM  3.8  3.7   CHLORIDE  105  107   CO2  24  23   ANIONGAP  9  8   GLC  89  108*   BUN  9  10   CR  0.88  0.78   JUAN  8.9  8.4*       CBC RESULTS:   Recent Labs  "  Lab Test  10/20/17   1150   WBC  6.1   RBC  4.29   HGB  13.4   HCT  38.8   MCV  90   MCH  31.2   MCHC  34.5   RDW  12.5   PLT  216       Most Recent 3 INR's:  Recent Labs   Lab Test  07/25/17   1249   INR  1.01           ASSESSMENT:    Chronic low back pain radiating to bilateral lower extremity  Persistent headache  Chronic neck pain  Chronic opioid therapy  Clinical diagnosis of Tom-Danlos syndrome  Chronic UTI  EMG finding of lower extremity radiculopathy  Bilateral lumbar facet arthritis  Anxiety disorder  Chronic pain syndrome  Post laminectomy pain syndrome          PLAN:      -  Interventions: None at this time. SUNSHINE's and SCS  options have been discussed with her but this would be a last resort as she feels her body \"rejects foreign objects\".   -  Medications:  getting excellent benefit with butrans at this time and is on a stable dose.    -  Continue tizanidine 8mg to help with her muscle spasms.  - Continue naproxen and ACETAMINOPHEN prn.  - Take Amitriptyline 40 mg at bedtime for 2 weeks and then increase to 50 mg.  - Take Butrans patch 10 mics per hour.  Change patch every 7 days.  Discussed about increasing the patch to 15 mics per hour.  -Continue to take tramadol 50 mg every 6 hours as needed.  15 tablets dispensed today.  -Start taking Cymbalta 30 mg daily.  The dose may be increased to 60 mg nightly and 30 mg in the morning.  -start taking oxycodone 2.5 mg daily as needed.  15 tablets dispensed today.  -apply menthol cream 5% tid prn  - Physical Therapy:  Will start land based PT  - Psychology/Coping skills: continue therapy.   - integrated medicine: will refer to acupuncture therapy  - Diagnostic Studies:  NONE        Follow up: 2 months          Assessment will be ongoing with changes in treatment as indicated.  Benefits/risks/alternatives to treatment have been reviewed and the patient has been instructed to contact this office if they have any questions or concerns.  This plan of care has " been discussed with the patient and the patient is in agreement.     Faiza Martinez MD, PHD    Answers for HPI/ROS submitted by the patient on 6/29/2018   SAMANTHA 7 TOTAL SCORE: 0  PHQ-2 Score: 0

## 2018-06-29 NOTE — LETTER
6/29/2018       RE: Ada Welch  [address] [place] [address]     Dear Colleague,    Thank you for referring your patient, Ada Welch, to the [facility] at [facility]. Please see a copy of my visit note below.    [facility] Comprehensive Chronic Pain Management : Progress Note    Date of visit: 6/29/2018    Interval history:  Ada Welch is a 47 year old female with past medical history significant for Ehler Danlos syndrome is known to me for chronic  low back pain, which is mostly located at lower lumbar area. The patient has very complex history of low back pain for nearly 24 years for which she underwent L4-S1 fusion (TLIF) 12/09/2013.  Postoperatively her  back pain even became much worse than preop.  Mainly LBP, but also radiates down both legs S1 dermatome distribution.   She then had  removal of posterior instrumentation (screws and guadalupe) with exploration of fusion.  Fusion noted to already be solid.  NO significant relief afforded by this surgery. The pain has been progressively worsened, limiting the patient s activities, affecting mood  and sleep quality, and causing a decrease in the patient s quality of life.  She underwent multiple SUNSHINE's which were not helpful, and even made things worse.  Recently started aquatic based physical therapy. Previous EMG showed some L5 radiculopathy on left. Her pain significantly improved after we started her butrans patch. She continues to be more functional than before the butrans was prescribed. She is able to walk longer distances and do activities with less pain that before. She has numbness in her left 4th toe that has been constant since her surgery. She denies any significant side effects from the medications. She also denies any new bowel or bladder issues, new weakness or numbness.       The patient also reports headache, neck  "pain, back pain, bilateral lower extremity pain.  Patient has seen neurologist Dr. Galeano.  She had an EMG study performed by Dr. Pickard, which showed an L5 radiculopathy on the left.  A repeat EMG ordered by Dr. Galeano.  Dr. Galeano referred her to Dr. Noemy Nguyen, headache specialist.  During the last clinic visit we performed bilateral occipital nerve block, however she had no relief.    She has also been seeing Dr. Singh  for persistent UTI.  She has been seeing Dr. Moncada for chronic bilateral lower extremity pain.  Per Dr. 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  No surgery required for her current symptomatology.     She reports that recently her headache and neck pain  symptoms have been increased for which she has been taking intermittently tramadol  50 mg, approximately 15-30 tablets a month.  She has been also on amitriptyline 30 mg nightly, acetaminophen as needed, and tizanidine 8 mg as needed.           Recommendations/plan at the last visit included:  - Interventions: Will schedule her for occipital blocks.  SUNSHINE's and SCS and other options have been discussed with her but this would be a last resort as she feels her body \"rejects foreign objects\". Discussed about Botox injection for migraine headache if conservative therapies fail.   - Medications:  getting excellent benefit with butrans at this time and is on a stable dose.    -  Continue tizanidine 8mg to help with her muscle spasms.  Continue ibuprofen and ACETAMINOPHEN prn.  - Take Amitriptyline 30 mg at bedtime.  - Physical Therapy:  Continue as scheduled - she also does aquatic therapy - she will continue her HEP  - Psychology/Coping skills: continue therapy in [place]  - Diagnostic Studies:  None today          [place] Prescription Monitoring Program:   Reviewed. No concerns    Review of Systems:  The 14 system ROS was reviewed and was negative except what is " "documented above and as follows.  Any bowel or bladder problems: none  Mood: okay    Physical Exam:  Vitals:    06/29/18 0954   BP: 117/77   Pulse: 74   Resp: 16   Weight: 91.2 kg (201 lb)   Height: 1.702 m (5' 7\")       General: Awake in no apparent distress.   Eyes: Sclerae are anicteric. PERRLA, EOMI   Neck: supple, no masses.   Lungs: unlabored.   Heart: regular rate and rhythm   Abdomen: soft non tender.  Extremities: Pulses are well palpable, no peripheral edema.   Musculoskeletal: 5/5 muscle strength in all extremities.   Neurologic exam:Sensation intact throughout all dermatomes bilateral upper extremities and lower extremities  Psychiatric; Normal affect.   Skin: Warm and Dry.    Medications:  Current Outpatient Prescriptions   Medication Sig Dispense Refill     albuterol (PROAIR HFA/PROVENTIL HFA/VENTOLIN HFA) 108 (90 BASE) MCG/ACT Inhaler Inhale 2 puffs into the lungs       amitriptyline (ELAVIL) 10 MG tablet Take 3 tablets (30 mg) by mouth At Bedtime 270 tablet 3     buprenorphine (BUTRANS) 10 MCG/HR WK patch Place 1 patch onto the skin every 7 days 4 patch 2     butalbital-acetaminophen-caffeine (FIORICET/ESGIC) -40 MG per tablet TAKE ONE TABLET BY MOUTH EVERY 4 HOURS AS NEEDED  0     cetirizine (ZYRTEC) 10 MG tablet Take 10 mg by mouth daily        COMPRESSION STOCKINGS 1 each daily 20-30 mmHg Thigh Length measure and fit, color and style per patient preference. Doff n jose per need. 3 each 3     DULoxetine (CYMBALTA) 30 MG EC capsule Take 2 capsules (60 mg) by mouth daily 60 capsule 1     eletriptan (RELPAX) 20 MG tablet Take 1 tablet (20 mg) by mouth at onset of headache for migraine May repeat in 2 hours. Max 4 tablets/24 hours. 18 tablet 1     EPINEPHrine (EPIPEN/ADRENACLICK/OR ANY BX GENERIC EQUIV) 0.3 MG/0.3ML injection 2-pack Inject 0.3 mg into the muscle       famotidine (PEPCID) 20 MG tablet Take 1 tablet (20 mg) by mouth At Bedtime 90 tablet 1     fludrocortisone (FLORINEF) 0.1 MG " tablet Take 2 tablets (0.2 mg) by mouth daily 180 tablet 3     fluticasone (FLONASE) 50 MCG/ACT spray INSTILL 2 SPRAYS INTO BOTH NOSTRILS DAILY 16 mL 3     fluticasone-salmeterol (ADVAIR DISKUS) 250-50 MCG/DOSE diskus inhaler Inhale 1 puff into the lungs 2 times daily as needed        levothyroxine (SYNTHROID) 25 MCG tablet Take 25 mcg by mouth daily       linaclotide (LINZESS) 145 MCG capsule Take 1 capsule (145 mcg) by mouth every morning (before breakfast) Last refill until GI clinic follow up. 30 capsule 0     metoclopramide (REGLAN) 5 MG tablet Take 1-2 tabs po Q 6 hrs as needed for nausea 60 tablet 3     Multiple Vitamins-Minerals (MULTIVITAMIN PO) Take by mouth daily       omeprazole (PRILOSEC) 20 MG CR capsule Take 1 capsule (20 mg) by mouth daily 90 capsule 2     ondansetron (ZOFRAN-ODT) 4 MG ODT tab Take 1-2 tablets (4-8 mg) by mouth every 12 hours as needed for nausea 60 tablet 1     ONE DAILY MULTIPLE VITAMIN OR        oxyCODONE IR (ROXICODONE) 5 MG tablet Take 0.5 tablets (2.5 mg) by mouth every 6 hours as needed for severe pain 15 tablet 0     Probiotic Product (PROBIOTIC DAILY PO) Take by mouth 2 times daily 2 packets daily       Propranolol HCl SR Beads 80 MG CP24 Take 80 mg by mouth daily 90 capsule 3     rizatriptan (MAXALT-MLT) 5 MG ODT tab TAKE 1-2 TABLETS (5-10 MG) BY MOUTH AT ONSET OF HEADACHE FOR MIGRAINE (MAX 30 MG IN 24 HOURS)  6     SUMAtriptan (IMITREX STATDOSE) 6 MG/0.5ML pen injector kit Inject 0.5 mLs (6 mg) Subcutaneous at onset of headache for migraine (may repeat once after 2 hrs) May repeat in 1 hour. Max 12 mg/24 hours. 2 kit 1     tiZANidine (ZANAFLEX) 4 MG tablet Take 1 tablet (4 mg) by mouth 3 times daily as needed for muscle spasms 90 tablet 3     traMADol (ULTRAM) 50 MG tablet Take 1 tablet (50 mg) by mouth every 6 hours as needed for pain maximum 4 tablet(s) per day 15 tablet 1     vitamin D (ERGOCALCIFEROL) 15958 UNIT capsule        zonisamide (ZONEGRAN) 25 MG capsule Take  "125mg daily for 2 weeks, then 150mg 360 capsule 3         LABORATORY VALUES:   Recent Labs   Lab Test  03/02/18   1340  10/20/17   1150   NA  138  138   POTASSIUM  3.8  3.7   CHLORIDE  105  107   CO2  24  23   ANIONGAP  9  8   GLC  89  108*   BUN  9  10   CR  0.88  0.78   JUAN  8.9  8.4*       CBC RESULTS:   Recent Labs   Lab Test  10/20/17   1150   WBC  6.1   RBC  4.29   HGB  13.4   HCT  38.8   MCV  90   MCH  31.2   MCHC  34.5   RDW  12.5   PLT  216       Most Recent 3 INR's:  Recent Labs   Lab Test  07/25/17   1249   INR  1.01           ASSESSMENT:    Chronic low back pain radiating to bilateral lower extremity  Persistent headache  Chronic neck pain  Chronic opioid therapy  Clinical diagnosis of Tom-Danlos syndrome  Chronic UTI  EMG finding of lower extremity radiculopathy  Bilateral lumbar facet arthritis  Anxiety disorder  Chronic pain syndrome  Post laminectomy pain syndrome          PLAN:      -  Interventions: None at this time. SUNSHINE's and SCS  options have been discussed with her but this would be a last resort as she feels her body \"rejects foreign objects\".   -  Medications:  getting excellent benefit with butrans at this time and is on a stable dose.    -  Continue tizanidine 8mg to help with her muscle spasms.  - Continue naproxen and ACETAMINOPHEN prn.  - Take Amitriptyline 40 mg at bedtime for 2 weeks and then increase to 50 mg.  - Take Butrans patch 10 mics per hour.  Change patch every 7 days.  Discussed about increasing the patch to 15 mics per hour.  -Continue to take tramadol 50 mg every 6 hours as needed.  15 tablets dispensed today.  -Start taking Cymbalta 30 mg daily.  The dose may be increased to 60 mg nightly and 30 mg in the morning.  -start taking oxycodone 2.5 mg daily as needed.  15 tablets dispensed today.  -apply menthol cream 5% tid prn  - Physical Therapy:  Will start land based PT  - Psychology/Coping skills: continue therapy.   - integrated medicine: will refer to acupuncture " therapy  - Diagnostic Studies:  NONE        Follow up: 2 months          Assessment will be ongoing with changes in treatment as indicated.  Benefits/risks/alternatives to treatment have been reviewed and the patient has been instructed to contact this office if they have any questions or concerns.  This plan of care has been discussed with the patient and the patient is in agreement.     Faiza Martinez MD, PHD

## 2018-06-29 NOTE — IP AVS SNAPSHOT
MRN:[id number]                      After Visit Summary   6/29/2018    Ada Welch    MRN: [id number]           Visit Information        Department      6/29/2018 12:19 PM [address] [facility]          Review of your medicines      UNREVIEWED medicines. Ask your doctor about these medicines        Dose / Directions    ADVAIR DISKUS 250-50 MCG/DOSE diskus inhaler   Generic drug:  fluticasone-salmeterol        Dose:  1 puff   Inhale 1 puff into the lungs 2 times daily as needed   Refills:  0       albuterol 108 (90 Base) MCG/ACT Inhaler   Commonly known as:  PROAIR HFA/PROVENTIL HFA/VENTOLIN HFA        Dose:  2 puff   Inhale 2 puffs into the lungs   Refills:  0       amitriptyline 10 MG tablet   Commonly known as:  ELAVIL   Used for:  Chronic migraine without aura without status migrainosus, not intractable        Dose:  30 mg   Take 3 tablets (30 mg) by mouth At Bedtime   Quantity:  270 tablet   Refills:  3       buprenorphine 10 MCG/HR WK patch   Commonly known as:  BUTRANS   Used for:  Postlaminectomy syndrome of lumbar region        Dose:  1 patch   Place 1 patch onto the skin every 7 days   Quantity:  4 patch   Refills:  2       butalbital-acetaminophen-caffeine -40 MG per tablet   Commonly known as:  FIORICET/ESGIC        TAKE ONE TABLET BY MOUTH EVERY 4 HOURS AS NEEDED   Refills:  0       cetirizine 10 MG tablet   Commonly known as:  zyrTEC        Dose:  10 mg   Take 10 mg by mouth daily   Refills:  0       DULoxetine 30 MG EC capsule   Commonly known as:  CYMBALTA   Used for:  Lumbar post-laminectomy syndrome        Dose:  60 mg   Take 2 capsules (60 mg) by mouth daily   Quantity:  60 capsule   Refills:  1       eletriptan 20 MG tablet   Commonly known as:  RELPAX   Used for:  Chronic daily headache, Intractable migraine without aura and with status migrainosus        Dose:  20 mg   Take 1 tablet (20 mg) by mouth at onset of headache for migraine May repeat in 2 hours. Max 4  tablets/24 hours.   Quantity:  18 tablet   Refills:  1       EPINEPHrine 0.3 MG/0.3ML injection 2-pack   Commonly known as:  EPIPEN/ADRENACLICK/or ANY BX GENERIC EQUIV        Dose:  0.3 mg   Inject 0.3 mg into the muscle   Refills:  0       famotidine 20 MG tablet   Commonly known as:  PEPCID   Used for:  Hiatal hernia, Mast cell disorder        Dose:  20 mg   Take 1 tablet (20 mg) by mouth At Bedtime   Quantity:  90 tablet   Refills:  1       fludrocortisone 0.1 MG tablet   Commonly known as:  FLORINEF   Used for:  Autonomic dysfunction, Pre-syncope        Dose:  0.2 mg   Take 2 tablets (0.2 mg) by mouth daily   Quantity:  180 tablet   Refills:  3       fluticasone 50 MCG/ACT spray   Commonly known as:  FLONASE   Used for:  Chronic rhinitis, unspecified type        INSTILL 2 SPRAYS INTO BOTH NOSTRILS DAILY   Quantity:  16 mL   Refills:  3       linaclotide 145 MCG capsule   Commonly known as:  LINZESS   Used for:  Chronic idiopathic constipation        Dose:  145 mcg   Take 1 capsule (145 mcg) by mouth every morning (before breakfast) Last refill until GI clinic follow up.   Quantity:  30 capsule   Refills:  0       metoclopramide 5 MG tablet   Commonly known as:  REGLAN        Take 1-2 tabs po Q 6 hrs as needed for nausea   Quantity:  60 tablet   Refills:  3       MULTIVITAMIN PO        Take by mouth daily   Refills:  0       omeprazole 20 MG CR capsule   Commonly known as:  priLOSEC   Used for:  Hiatal hernia        Dose:  20 mg   Take 1 capsule (20 mg) by mouth daily   Quantity:  90 capsule   Refills:  2       ondansetron 4 MG ODT tab   Commonly known as:  ZOFRAN-ODT   Used for:  Migraine without status migrainosus, not intractable, unspecified migraine type        Dose:  4-8 mg   Take 1-2 tablets (4-8 mg) by mouth every 12 hours as needed for nausea   Quantity:  60 tablet   Refills:  1       ONE DAILY MULTIPLE VITAMIN OR        Refills:  0       oxyCODONE IR 5 MG tablet   Commonly known as:  ROXICODONE   Used  for:  Postlaminectomy syndrome        Dose:  2.5 mg   Take 0.5 tablets (2.5 mg) by mouth every 6 hours as needed for severe pain   Quantity:  15 tablet   Refills:  0       PROBIOTIC DAILY PO        Take by mouth 2 times daily 2 packets daily   Refills:  0       Propranolol HCl SR Beads 80 MG Cp24   Used for:  Sinus tachycardia        Dose:  80 mg   Take 80 mg by mouth daily   Quantity:  90 capsule   Refills:  3       rizatriptan 5 MG ODT tab   Commonly known as:  MAXALT-MLT        TAKE 1-2 TABLETS (5-10 MG) BY MOUTH AT ONSET OF HEADACHE FOR MIGRAINE (MAX 30 MG IN 24 HOURS)   Refills:  6       SUMAtriptan 6 MG/0.5ML pen injector kit   Commonly known as:  IMITREX STATDOSE   Used for:  Chronic migraine without aura without status migrainosus, not intractable        Dose:  6 mg   Inject 0.5 mLs (6 mg) Subcutaneous at onset of headache for migraine (may repeat once after 2 hrs) May repeat in 1 hour. Max 12 mg/24 hours.   Quantity:  2 kit   Refills:  1       SYNTHROID 25 MCG tablet   Generic drug:  levothyroxine        Dose:  25 mcg   Take 25 mcg by mouth daily   Refills:  0       tiZANidine 4 MG tablet   Commonly known as:  ZANAFLEX   Used for:  Post laminectomy syndrome        Dose:  4 mg   Take 1 tablet (4 mg) by mouth 4 times daily as needed for muscle spasms   Quantity:  120 tablet   Refills:  3       * traMADol 50 MG tablet   Commonly known as:  ULTRAM   Used for:  Lumbar post-laminectomy syndrome        Dose:  50 mg   Take 1 tablet (50 mg) by mouth every 6 hours as needed for pain maximum 4 tablet(s) per day   Quantity:  15 tablet   Refills:  1       * traMADol 50 MG tablet   Commonly known as:  ULTRAM   Used for:  Postlaminectomy syndrome        Dose:  50 mg   Take 1 tablet (50 mg) by mouth every 6 hours as needed for severe pain   Quantity:  15 tablet   Refills:  0       vitamin D 69228 UNIT capsule   Commonly known as:  ERGOCALCIFEROL        Refills:  0       zonisamide 25 MG capsule   Commonly known as:   Zonegran   Used for:  Occipital neuritis        Take 125mg daily for 2 weeks, then 150mg   Quantity:  360 capsule   Refills:  3       * Notice:  This list has 2 medication(s) that are the same as other medications prescribed for you. Read the directions carefully, and ask your doctor or other care provider to review them with you.      CONTINUE these medicines which have NOT CHANGED        Dose / Directions    COMPRESSION STOCKINGS   Used for:  Orthostatic hypotension        Dose:  1 each   1 each daily 20-30 mmHg Thigh Length measure and fit, color and style per patient preference. Doff n jose per need.   Quantity:  3 each   Refills:  3                Protect others around you: Learn how to safely use, store and throw away your medicines at www.disposemymeds.org.         Follow-ups after your visit        Your next 10 appointments already scheduled     Jul 23, 2018 12:15 PM CDT   (Arrive by 12:00 PM)   New Allergy with Vahe Rodriguez MD   [facility] ([facility])    [address] [place] [id number] [phone]           Do not take anti-histamines or Zantac for seven day prior to your appointment.            Aug 08, 2018  2:15 PM CDT   (Arrive by 2:00 PM)   Return Visit with Wade Singh MD   [facility] ([facility])    [address] [place] [id number] [phone]            Aug 08, 2018  4:20 PM CDT   (Arrive by 4:05 PM)   Return Visit with Juana Griffith MD   [facility] ([facility])    [address] [place] [id number] [phone]            Aug 22, 2018  3:40 PM CDT   (Arrive by 3:25 PM)   Return Visit with Juana Griffith MD   [facility] ([facility])    [address] [place] [address]  [address] [facility] [address]   [phone]            Sep 13, 2018  1:50 PM CDT   (Arrive by 1:35 PM)   RETURN ARRHYTHMIA with Kirk Russell MD   [facility] ([facility])    [address] [place] [address]  [address]  [facility] [address]   [phone]            Oct 25, 2018  9:25 AM CDT   (Arrive by 9:10 AM)   Return Visit with Ellyn Rivera MD   [facility] ([facility])    [address] [place] [id number]   [phone]               Care Instructions        Further instructions from your care team        Loop Recorder Explant    Physician:  Dr. Russell      AFTER YOU GO HOME:   DO    Relax and take it easy for 24 hours.    Drink plenty of fluids, unless otherwise instructed by the physician.    Resume your regular diet, unless otherwise instructed by the physician.    Continue you medications, unless otherwise instructed by the physician.    Take dressing off the following morning.    No Lotions, powders or creams over Dermabond skin glue.    No submersion of incision for 10 days.         DON'T      NO strenuous exercise of lifting above the shoulder level for the next 14 days.    NO swimming until incision is completely healed (approximately 10days).    NO lotion or powder to the incision site until completely healed.      CALL YOUR PRIMARY PHYSICIAN, OR THE CARDIOLOGY FELLOW IF:    YOU develop a fever or any redness, swelling, or drainage from your incision        Telephone: [phone]  Ask for the Cardiology Fellow on-call.  Someone is on-call 24hrs/day.    Device Nurse: [phone]  Available 7:00 AM - 3:30 PM  Monday-Friday.           Additional Information About Your Visit        MyChart Information     [employer] gives you secure access to your electronic health record. If you see a primary care provider, you can also send messages to your care team and make appointments. If you have  "questions, please call your primary care clinic.  If you do not have a primary care provider, please call [phone] and they will assist you.        Care EveryWhere ID     This is your Care EveryWhere ID. This could be used by other organizations to access your [facility] medical records  [id number]        Your Vitals Were     Blood Pressure Pulse Temperature Respirations Height Weight    115/76 69 98.4  F (36.9  C) (Oral) 18 1.702 m (5' 7\") 91.2 kg (201 lb 1 oz)    Pulse Oximetry BMI (Body Mass Index)                97% 31.49 kg/m2           Primary Care Provider Office Phone # Fax #    Ellyn Rivera [phone]      Equal Access to Services     CHRISTINA GUY : Hadii kia romoo Sonoyali, waaxda luqadaha, qaybta kaalmada adeegyada, odette dykes . So [facility] [phone].    ATENCIÓN: Si habla español, tiene a sweenye disposición servicios gratuitos de asistencia lingüística. [facility] [phone].    We comply with applicable federal civil rights laws and [place] laws. We do not discriminate on the basis of race, color, national origin, age, disability, sex, sexual orientation, or gender identity.            Thank you!     Thank you for choosing [facility] for your care. Our goal is always to provide you with excellent care. Hearing back from our patients is one way we can continue to improve our services. Please take a few minutes to complete the written survey that you may receive in the mail after you visit with us. Thank you!             Medication List: This is a list of all your medications and when to take them. Check marks below indicate your daily home schedule. Keep this list as a reference.      Medications           Morning Afternoon Evening Bedtime As Needed    ADVAIR DISKUS 250-50 MCG/DOSE diskus inhaler   Inhale 1 puff into the lungs 2 times daily as needed   Generic drug:  fluticasone-salmeterol                                albuterol 108 (90 Base) " MCG/ACT Inhaler   Commonly known as:  PROAIR HFA/PROVENTIL HFA/VENTOLIN HFA   Inhale 2 puffs into the lungs                                amitriptyline 10 MG tablet   Commonly known as:  ELAVIL   Take 3 tablets (30 mg) by mouth At Bedtime                                buprenorphine 10 MCG/HR WK patch   Commonly known as:  BUTRANS   Place 1 patch onto the skin every 7 days                                butalbital-acetaminophen-caffeine -40 MG per tablet   Commonly known as:  FIORICET/ESGIC   TAKE ONE TABLET BY MOUTH EVERY 4 HOURS AS NEEDED                                cetirizine 10 MG tablet   Commonly known as:  zyrTEC   Take 10 mg by mouth daily                                COMPRESSION STOCKINGS   1 each daily 20-30 mmHg Thigh Length measure and fit, color and style per patient preference. Doshar n jose per need.                                DULoxetine 30 MG EC capsule   Commonly known as:  CYMBALTA   Take 2 capsules (60 mg) by mouth daily                                eletriptan 20 MG tablet   Commonly known as:  RELPAX   Take 1 tablet (20 mg) by mouth at onset of headache for migraine May repeat in 2 hours. Max 4 tablets/24 hours.                                EPINEPHrine 0.3 MG/0.3ML injection 2-pack   Commonly known as:  EPIPEN/ADRENACLICK/or ANY BX GENERIC EQUIV   Inject 0.3 mg into the muscle                                famotidine 20 MG tablet   Commonly known as:  PEPCID   Take 1 tablet (20 mg) by mouth At Bedtime                                fludrocortisone 0.1 MG tablet   Commonly known as:  FLORINEF   Take 2 tablets (0.2 mg) by mouth daily                                fluticasone 50 MCG/ACT spray   Commonly known as:  FLONASE   INSTILL 2 SPRAYS INTO BOTH NOSTRILS DAILY                                linaclotide 145 MCG capsule   Commonly known as:  LINZESS   Take 1 capsule (145 mcg) by mouth every morning (before breakfast) Last refill until GI clinic follow up.                                 metoclopramide 5 MG tablet   Commonly known as:  REGLAN   Take 1-2 tabs po Q 6 hrs as needed for nausea                                MULTIVITAMIN PO   Take by mouth daily                                omeprazole 20 MG CR capsule   Commonly known as:  priLOSEC   Take 1 capsule (20 mg) by mouth daily                                ondansetron 4 MG ODT tab   Commonly known as:  ZOFRAN-ODT   Take 1-2 tablets (4-8 mg) by mouth every 12 hours as needed for nausea                                ONE DAILY MULTIPLE VITAMIN OR                                oxyCODONE IR 5 MG tablet   Commonly known as:  ROXICODONE   Take 0.5 tablets (2.5 mg) by mouth every 6 hours as needed for severe pain                                PROBIOTIC DAILY PO   Take by mouth 2 times daily 2 packets daily                                Propranolol HCl SR Beads 80 MG Cp24   Take 80 mg by mouth daily                                rizatriptan 5 MG ODT tab   Commonly known as:  MAXALT-MLT   TAKE 1-2 TABLETS (5-10 MG) BY MOUTH AT ONSET OF HEADACHE FOR MIGRAINE (MAX 30 MG IN 24 HOURS)                                SUMAtriptan 6 MG/0.5ML pen injector kit   Commonly known as:  IMITREX STATDOSE   Inject 0.5 mLs (6 mg) Subcutaneous at onset of headache for migraine (may repeat once after 2 hrs) May repeat in 1 hour. Max 12 mg/24 hours.                                SYNTHROID 25 MCG tablet   Take 25 mcg by mouth daily   Generic drug:  levothyroxine                                tiZANidine 4 MG tablet   Commonly known as:  ZANAFLEX   Take 1 tablet (4 mg) by mouth 4 times daily as needed for muscle spasms                                * traMADol 50 MG tablet   Commonly known as:  ULTRAM   Take 1 tablet (50 mg) by mouth every 6 hours as needed for pain maximum 4 tablet(s) per day                                * traMADol 50 MG tablet   Commonly known as:  ULTRAM   Take 1 tablet (50 mg) by mouth every 6 hours as needed for severe pain                                 vitamin D 77473 UNIT capsule   Commonly known as:  ERGOCALCIFEROL                                zonisamide 25 MG capsule   Commonly known as:  Zonegran   Take 125mg daily for 2 weeks, then 150mg                                * Notice:  This list has 2 medication(s) that are the same as other medications prescribed for you. Read the directions carefully, and ask your doctor or other care provider to review them with you.

## 2018-06-29 NOTE — IP AVS SNAPSHOT
[address] [address]                                       After Visit Summary   6/29/2018    Ada Welch    MRN: [id number]           After Visit Summary Signature Page     I have received my discharge instructions, and my questions have been answered. I have discussed any challenges I see with this plan with the nurse or doctor.    ..........................................................................................................................................  Patient/Patient Representative Signature      ..........................................................................................................................................  Patient Representative Print Name and Relationship to Patient    ..................................................               ................................................  Date                                            Time    ..........................................................................................................................................  Reviewed by Signature/Title    ...................................................              ..............................................  Date                                                            Time

## 2018-06-29 NOTE — PROGRESS NOTES
Returned from CCL s/p Loop explant.  VSS.  Denies pain.  Left sternal explant site CDI with dermabond.  Reviewed discharge instructions with patient.  Discharged to home with .

## 2018-06-29 NOTE — OP NOTE
EP PROCEDURE NOTE    Procedures:  1. Implantable loop recorder explantation.    Attending: TRANG Russell  EP Fellow: N/A  Procedure Date: 6/29/2018    Pre-operative Diagnosis:  ILR at end of Op life  Post-operative diagnosis:   Successful explantation of ILR.  Complications: None  Fluoroscopy time/dose:None    Clinical Profile:  Ada Welch is a 46 yo female who has had an ILR in place but now at end of useful life. Patient also followed by Neurology for Headaches and requires MR scans. Here for ILR removal    PROCEDURE  The risks and benefits of the procedure were explained to the patient in full.  The risks of the procedure include, but are not limited to: pain, bleeding, blood transfusion reactions and infection. Informed Consent was obtained. The patient was brought to the EP lab in a fasting and hemodynamically stable condition. The patient was prepped and draped in a sterile fashion.     Sedation: This procedure was performed under local anesthetic only  under the supervision of the the Staff Physician.Heart rate, blood pressure, oxygen saturation, and patient responses were monitored throughout the procedure with the assistance of the RN.     The left chest wall in region of ILR was anesthetized with 2% Lidocaine. An incision was made with the scalpel over the old incision site. The subcutaneous tissue was dissected down until the ILR was exposed. The ILR was then removed. The pocket was then irrigated with antibiotic solution.    The pocket was closed using Dermabond adhesive    Dr. Russell was present throughout the entire procedure.    EXPLANTED ILR; [employer] LINQ Serial number [id number]  Sent to pathology    I appreciated the opportunity to see and assess Mrs Ada Welch and direct the procedure described above with EP Fellow. The above note summarizes my findings and current recommendations. Please do not hesitate to contact me if you have any questions or concerns.    Kirk Russell MD [facility]    Pager [phone]  Office [phone]

## 2018-06-29 NOTE — MR AVS SNAPSHOT
After Visit Summary   6/29/2018    Ada Welch    MRN: [id number]           Patient Information     Date Of Birth          1970        Visit Information        Provider Department      6/29/2018 10:00 AM Faiza Martinez MD [facility]        Today's Diagnoses     Postlaminectomy syndrome    -  1      Care Instructions    1. Increase amitriptyline to 40mg at bedtime for two weeks, then increase to 50mg at bedtime.     2. Start taking 30mg of cymbalta in the morning, and continue taking 60mg at bedtime.     3. Tramadol and oxycodone refilled today.     Follow up: 3 months or sooner if needed       To speak with a nurse, schedule/reschedule/cancel a clinic appointment, or request a medication refill call: [phone]     You can also reach us by Replise: [email or web address]    For refills, please call on Monday, 1 week before your medication runs out. The doctors are not always in clinic, so this gives us time to get your prescriptions ready.  Please let us know the name of the medication you are requesting a refill of.                                     Follow-ups after your visit        Your next 10 appointments already scheduled     Jun 29, 2018 12:30 PM CDT   Procedure 3 hour with U2A ROOM 1   [address] [facility] [place] ([facility])    [address] [address]               Jun 29, 2018  2:00 PM CDT   Ep 90 Minute with UUHCVR2   [facility] ([facility])    [address] [address] [phone]            Jul 23, 2018 12:15 PM CDT   (Arrive by 12:00 PM)   New Allergy with Vahe Rodriguez MD   [facility] ([facility])    [address] [place] [address]  [address] [place] [id number] [phone]           Do not take anti-histamines or Zantac  for seven day prior to your appointment.            Aug 08, 2018  2:15 PM CDT   (Arrive by 2:00 PM)   Return Visit with Wade Singh MD   [facility] ([facility])    [address] [place] [address]   [phone]            Aug 08, 2018  4:20 PM CDT   (Arrive by 4:05 PM)   Return Visit with Juana Griffith MD   [facility] ([facility])    [address] [place] [address]   [phone]            Aug 22, 2018  3:40 PM CDT   (Arrive by 3:25 PM)   Return Visit with Juana Griffith MD   [facility] ([facility])    [address] [place] [address]   [phone]            Sep 13, 2018  1:50 PM CDT   (Arrive by 1:35 PM)   RETURN ARRHYTHMIA with Kirk Russell MD   [facility] ([facility])    [address] [place] [address]   [phone]            Oct 25, 2018  9:25 AM CDT   (Arrive by 9:10 AM)   Return Visit with Ellyn Rivera MD   [facility] ([facility])    [address] [place] [address]   [phone]              Who to contact     Please call your clinic at [phone] to:    Ask questions about your health    Make or cancel appointments    Discuss your medicines    Learn about your test results    Speak to your doctor            Additional Information About Your Visit        MyChart Information     Allakoshart gives you secure access to your electronic health record. If you see a primary care provider, you can also send messages to your care team and make appointments. If you have questions, please call your primary care clinic.  If you do not have a primary care provider, please call [phone] and they will  "assist you.      NeoChord is an electronic gateway that provides easy, online access to your medical records. With Neo[employer], you can request a clinic appointment, read your test results, renew a prescription or communicate with your care team.     To access your existing account, please contact your [facility] or call [phone] for assistance.        Care EveryWhere ID     This is your Care EveryWhere ID. This could be used by other organizations to access your [facility] medical records  [id number]        Your Vitals Were     Pulse Respirations Height BMI (Body Mass Index)          74 16 1.702 m (5' 7\") 31.48 kg/m2         Blood Pressure from Last 3 Encounters:   06/29/18 117/77   06/07/18 105/77   06/04/18 127/90    Weight from Last 3 Encounters:   06/29/18 91.2 kg (201 lb)   06/07/18 91 kg (200 lb 9.6 oz)   06/04/18 89.8 kg (198 lb)              Today, you had the following     No orders found for display         Today's Medication Changes          These changes are accurate as of 6/29/18 10:27 AM.  If you have any questions, ask your nurse or doctor.               These medicines have changed or have updated prescriptions.        Dose/Directions    * traMADol 50 MG tablet   Commonly known as:  ULTRAM   This may have changed:  Another medication with the same name was added. Make sure you understand how and when to take each.   Used for:  Lumbar post-laminectomy syndrome   Changed by:  Faiza Martinez MD        Dose:  50 mg   Take 1 tablet (50 mg) by mouth every 6 hours as needed for pain maximum 4 tablet(s) per day   Quantity:  15 tablet   Refills:  1       * traMADol 50 MG tablet   Commonly known as:  ULTRAM   This may have changed:  You were already taking a medication with the same name, and this prescription was added. Make sure you understand how and when to take each.   Used for:  Postlaminectomy syndrome   Changed by:  Faiza Martinez MD        Dose:  50 mg   Take 1 tablet " (50 mg) by mouth every 6 hours as needed for severe pain   Quantity:  15 tablet   Refills:  0       * Notice:  This list has 2 medication(s) that are the same as other medications prescribed for you. Read the directions carefully, and ask your doctor or other care provider to review them with you.         Where to get your medicines      Some of these will need a paper prescription and others can be bought over the counter.  Ask your nurse if you have questions.     Bring a paper prescription for each of these medications     oxyCODONE IR 5 MG tablet    traMADol 50 MG tablet               Information about OPIOIDS     PRESCRIPTION OPIOIDS: WHAT YOU NEED TO KNOW   We gave you an opioid (narcotic) pain medicine. It is important to manage your pain, but opioids are not always the best choice. You should first try all the other options your care team gave you. Take this medicine for as short a time (and as few doses) as possible.     These medicines have risks:    DO NOT drive when on new or higher doses of pain medicine. These medicines can affect your alertness and reaction times, and you could be arrested for driving under the influence (DUI). If you need to use opioids long-term, talk to your care team about driving.    DO NOT operate heave machinery    DO NOT do any other dangerous activities while taking these medicines.     DO NOT drink any alcohol while taking these medicines.      If the opioid prescribed includes acetaminophen, DO NOT take with any other medicines that contain acetaminophen. Read all labels carefully. Look for the word  acetaminophen  or  Tylenol.  Ask your pharmacist if you have questions or are unsure.    You can get addicted to pain medicines, especially if you have a history of addiction (chemical, alcohol or substance dependence). Talk to your care team about ways to reduce this risk.    Store your pills in a secure place, locked if possible. We will not replace any lost or stolen  medicine. If you don t finish your medicine, please throw away (dispose) as directed by your pharmacist. The [place] [employer]ution [employer] has more information about safe disposal: https://www.pca.[facility].[place].us/living-green/managing-unwanted-medications.     All opioids tend to cause constipation. Drink plenty of water and eat foods that have a lot of fiber, such as fruits, vegetables, prune juice, apple juice and high-fiber cereal. Take a laxative (Miralax, milk of magnesia, Colace, Senna) if you don t move your bowels at least every other day.          Primary Care Provider Office Phone # Fax #    Ellyn Rivera [phone]       [address] [address]        Equal Access to Services     CHRISTINA GUY : Ashutosh serrano Sonegrito, waaxda luqadaha, qaybta kaalmada adealanyadonnell, odette dykes . So [facility] [phone].    ATENCIÓN: Si habla español, tiene a sweeney disposición servicios gratuitos de asistencia lingüística. Llame al [phone].    We comply with applicable federal civil rights laws and [place] laws. We do not discriminate on the basis of race, color, national origin, age, disability, sex, sexual orientation, or gender identity.            Thank you!     Thank you for choosing [facility]  for your care. Our goal is always to provide you with excellent care. Hearing back from our patients is one way we can continue to improve our services. Please take a few minutes to complete the written survey that you may receive in the mail after your visit with us. Thank you!             Your Updated Medication List - Protect others around you: Learn how to safely use, store and throw away your medicines at www.disposemymeds.org.          This list is accurate as of 6/29/18 10:27 AM.  Always use your most recent med list.                   Brand Name Dispense Instructions for use Diagnosis    ADVAIR DISKUS  250-50 MCG/DOSE diskus inhaler   Generic drug:  fluticasone-salmeterol      Inhale 1 puff into the lungs 2 times daily as needed        albuterol 108 (90 Base) MCG/ACT Inhaler    PROAIR HFA/PROVENTIL HFA/VENTOLIN HFA     Inhale 2 puffs into the lungs        amitriptyline 10 MG tablet    ELAVIL    270 tablet    Take 3 tablets (30 mg) by mouth At Bedtime    Chronic migraine without aura without status migrainosus, not intractable       buprenorphine 10 MCG/HR WK patch    BUTRANS    4 patch    Place 1 patch onto the skin every 7 days    Postlaminectomy syndrome of lumbar region       butalbital-acetaminophen-caffeine -40 MG per tablet    FIORICET/ESGIC     TAKE ONE TABLET BY MOUTH EVERY 4 HOURS AS NEEDED        cetirizine 10 MG tablet    zyrTEC     Take 10 mg by mouth daily        COMPRESSION STOCKINGS     3 each    1 each daily 20-30 mmHg Thigh Length measure and fit, color and style per patient preference. Doff n jose per need.    Orthostatic hypotension       DULoxetine 30 MG EC capsule    CYMBALTA    60 capsule    Take 2 capsules (60 mg) by mouth daily    Lumbar post-laminectomy syndrome       eletriptan 20 MG tablet    RELPAX    18 tablet    Take 1 tablet (20 mg) by mouth at onset of headache for migraine May repeat in 2 hours. Max 4 tablets/24 hours.    Chronic daily headache, Intractable migraine without aura and with status migrainosus       EPINEPHrine 0.3 MG/0.3ML injection 2-pack    EPIPEN/ADRENACLICK/or ANY BX GENERIC EQUIV     Inject 0.3 mg into the muscle        famotidine 20 MG tablet    PEPCID    90 tablet    Take 1 tablet (20 mg) by mouth At Bedtime    Hiatal hernia, Mast cell disorder       fludrocortisone 0.1 MG tablet    FLORINEF    180 tablet    Take 2 tablets (0.2 mg) by mouth daily    Autonomic dysfunction, Pre-syncope       fluticasone 50 MCG/ACT spray    FLONASE    16 mL    INSTILL 2 SPRAYS INTO BOTH NOSTRILS DAILY    Chronic rhinitis, unspecified type       linaclotide 145 MCG capsule     LINZESS    30 capsule    Take 1 capsule (145 mcg) by mouth every morning (before breakfast) Last refill until GI clinic follow up.    Chronic idiopathic constipation       metoclopramide 5 MG tablet    REGLAN    60 tablet    Take 1-2 tabs po Q 6 hrs as needed for nausea        MULTIVITAMIN PO      Take by mouth daily        omeprazole 20 MG CR capsule    priLOSEC    90 capsule    Take 1 capsule (20 mg) by mouth daily    Hiatal hernia       ondansetron 4 MG ODT tab    ZOFRAN-ODT    60 tablet    Take 1-2 tablets (4-8 mg) by mouth every 12 hours as needed for nausea    Migraine without status migrainosus, not intractable, unspecified migraine type       ONE DAILY MULTIPLE VITAMIN OR           oxyCODONE IR 5 MG tablet    ROXICODONE    15 tablet    Take 0.5 tablets (2.5 mg) by mouth every 6 hours as needed for severe pain    Postlaminectomy syndrome       PROBIOTIC DAILY PO      Take by mouth 2 times daily 2 packets daily        Propranolol HCl SR Beads 80 MG Cp24     90 capsule    Take 80 mg by mouth daily    Sinus tachycardia       rizatriptan 5 MG ODT tab    MAXALT-MLT     TAKE 1-2 TABLETS (5-10 MG) BY MOUTH AT ONSET OF HEADACHE FOR MIGRAINE (MAX 30 MG IN 24 HOURS)        SUMAtriptan 6 MG/0.5ML pen injector kit    IMITREX STATDOSE    2 kit    Inject 0.5 mLs (6 mg) Subcutaneous at onset of headache for migraine (may repeat once after 2 hrs) May repeat in 1 hour. Max 12 mg/24 hours.    Chronic migraine without aura without status migrainosus, not intractable       SYNTHROID 25 MCG tablet   Generic drug:  levothyroxine      Take 25 mcg by mouth daily        tiZANidine 4 MG tablet    ZANAFLEX    90 tablet    Take 1 tablet (4 mg) by mouth 3 times daily as needed for muscle spasms    Post laminectomy syndrome       * traMADol 50 MG tablet    ULTRAM    15 tablet    Take 1 tablet (50 mg) by mouth every 6 hours as needed for pain maximum 4 tablet(s) per day    Lumbar post-laminectomy syndrome       * traMADol 50 MG tablet     ULTRAM    15 tablet    Take 1 tablet (50 mg) by mouth every 6 hours as needed for severe pain    Postlaminectomy syndrome       vitamin D 12740 UNIT capsule    ERGOCALCIFEROL          zonisamide 25 MG capsule    Zonegran    360 capsule    Take 125mg daily for 2 weeks, then 150mg    Occipital neuritis       * Notice:  This list has 2 medication(s) that are the same as other medications prescribed for you. Read the directions carefully, and ask your doctor or other care provider to review them with you.

## 2018-06-29 NOTE — DISCHARGE INSTRUCTIONS
Loop Recorder Explant    Physician:  Dr. Russell      AFTER YOU GO HOME:   DO    Relax and take it easy for 24 hours.    Drink plenty of fluids, unless otherwise instructed by the physician.    Resume your regular diet, unless otherwise instructed by the physician.    Continue you medications, unless otherwise instructed by the physician.    Take dressing off the following morning.    No Lotions, powders or creams over Dermabond skin glue.    No submersion of incision for 10 days.         DON'T      NO strenuous exercise of lifting above the shoulder level for the next 14 days.    NO swimming until incision is completely healed (approximately 10days).    NO lotion or powder to the incision site until completely healed.      CALL YOUR PRIMARY PHYSICIAN, OR THE CARDIOLOGY FELLOW IF:    YOU develop a fever or any redness, swelling, or drainage from your incision        Telephone: [phone]  Ask for the Cardiology Fellow on-call.  Someone is on-call 24hrs/day.    Device Nurse: [phone]  Available 7:00 AM - 3:30 PM  Monday-Friday.

## 2018-06-30 ASSESSMENT — ANXIETY QUESTIONNAIRES: GAD7 TOTAL SCORE: 0

## 2018-07-02 LAB — INTERPRETATION ECG - MUSE: NORMAL

## 2018-07-03 ENCOUNTER — MYC MEDICAL ADVICE (OUTPATIENT)
Dept: ANESTHESIOLOGY | Facility: CLINIC | Age: 48
End: 2018-07-03

## 2018-07-03 DIAGNOSIS — K59.04 CHRONIC IDIOPATHIC CONSTIPATION: ICD-10-CM

## 2018-07-03 DIAGNOSIS — M96.1 LUMBAR POST-LAMINECTOMY SYNDROME: ICD-10-CM

## 2018-07-05 NOTE — TELEPHONE ENCOUNTER
linaclotide (LINZESS) 145 MCG capsule  Last Written Prescription Date:  5/14/18  Last Fill Quantity: 30,   # refills: 0  Last Office Visit : 11/22/17  Future Office visit:  8/8/18    Routing  Because: per previous rf must be seen before rf.

## 2018-07-06 ENCOUNTER — MEDICAL CORRESPONDENCE (OUTPATIENT)
Dept: HEALTH INFORMATION MANAGEMENT | Facility: CLINIC | Age: 48
End: 2018-07-06

## 2018-07-06 ENCOUNTER — HOSPITAL ENCOUNTER (INPATIENT)
Facility: CLINIC | Age: 48
LOS: 2 days | Discharge: HOME | End: 2018-07-09
Attending: EMERGENCY MEDICINE | Admitting: PSYCHIATRY & NEUROLOGY
Payer: COMMERCIAL

## 2018-07-06 DIAGNOSIS — J31.0 CHRONIC RHINITIS, UNSPECIFIED TYPE: ICD-10-CM

## 2018-07-06 DIAGNOSIS — G43.811 OTHER MIGRAINE WITH STATUS MIGRAINOSUS, INTRACTABLE: ICD-10-CM

## 2018-07-06 LAB — COPATH REPORT: NORMAL

## 2018-07-06 PROCEDURE — 85610 PROTHROMBIN TIME: CPT | Performed by: EMERGENCY MEDICINE

## 2018-07-06 PROCEDURE — 96361 HYDRATE IV INFUSION ADD-ON: CPT | Performed by: EMERGENCY MEDICINE

## 2018-07-06 PROCEDURE — 80053 COMPREHEN METABOLIC PANEL: CPT | Performed by: EMERGENCY MEDICINE

## 2018-07-06 PROCEDURE — 96372 THER/PROPH/DIAG INJ SC/IM: CPT | Performed by: EMERGENCY MEDICINE

## 2018-07-06 PROCEDURE — 96376 TX/PRO/DX INJ SAME DRUG ADON: CPT | Performed by: EMERGENCY MEDICINE

## 2018-07-06 PROCEDURE — 99285 EMERGENCY DEPT VISIT HI MDM: CPT | Mod: Z6 | Performed by: EMERGENCY MEDICINE

## 2018-07-06 PROCEDURE — 25000128 H RX IP 250 OP 636: Performed by: EMERGENCY MEDICINE

## 2018-07-06 PROCEDURE — 99285 EMERGENCY DEPT VISIT HI MDM: CPT | Mod: 25 | Performed by: EMERGENCY MEDICINE

## 2018-07-06 PROCEDURE — 85025 COMPLETE CBC W/AUTO DIFF WBC: CPT | Performed by: EMERGENCY MEDICINE

## 2018-07-06 PROCEDURE — 96374 THER/PROPH/DIAG INJ IV PUSH: CPT | Performed by: EMERGENCY MEDICINE

## 2018-07-06 PROCEDURE — 96375 TX/PRO/DX INJ NEW DRUG ADDON: CPT | Performed by: EMERGENCY MEDICINE

## 2018-07-06 PROCEDURE — 25000125 ZZHC RX 250: Performed by: EMERGENCY MEDICINE

## 2018-07-06 RX ORDER — DULOXETIN HYDROCHLORIDE 30 MG/1
60 CAPSULE, DELAYED RELEASE ORAL DAILY
Qty: 60 CAPSULE | Refills: 5 | Status: SHIPPED | OUTPATIENT
Start: 2018-07-06 | End: 2018-07-07

## 2018-07-06 RX ORDER — KETOROLAC TROMETHAMINE 30 MG/ML
30 INJECTION, SOLUTION INTRAMUSCULAR; INTRAVENOUS ONCE
Status: COMPLETED | OUTPATIENT
Start: 2018-07-06 | End: 2018-07-06

## 2018-07-06 RX ORDER — OLANZAPINE 10 MG/2ML
10 INJECTION, POWDER, FOR SOLUTION INTRAMUSCULAR ONCE
Status: COMPLETED | OUTPATIENT
Start: 2018-07-06 | End: 2018-07-06

## 2018-07-06 RX ORDER — SODIUM CHLORIDE 9 MG/ML
1000 INJECTION, SOLUTION INTRAVENOUS CONTINUOUS
Status: DISCONTINUED | OUTPATIENT
Start: 2018-07-06 | End: 2018-07-09

## 2018-07-06 RX ADMIN — SODIUM CHLORIDE 1000 ML: 9 INJECTION, SOLUTION INTRAVENOUS at 22:19

## 2018-07-06 RX ADMIN — OLANZAPINE 10 MG: 10 INJECTION, POWDER, LYOPHILIZED, FOR SOLUTION INTRAMUSCULAR at 22:25

## 2018-07-06 RX ADMIN — KETOROLAC TROMETHAMINE 30 MG: 30 INJECTION, SOLUTION INTRAMUSCULAR at 22:20

## 2018-07-06 ASSESSMENT — ENCOUNTER SYMPTOMS
DIZZINESS: 1
NAUSEA: 1
NECK STIFFNESS: 1
VOMITING: 1
CONFUSION: 1
SPEECH DIFFICULTY: 1
CHILLS: 1
HEADACHES: 1
NUMBNESS: 1
FEVER: 0

## 2018-07-06 NOTE — IP AVS SNAPSHOT
[address] [address]    Phone:  [phone]                                       After Visit Summary   7/6/2018    Ada Welch    MRN: [id number]           After Visit Summary Signature Page     I have received my discharge instructions, and my questions have been answered. I have discussed any challenges I see with this plan with the nurse or doctor.    ..........................................................................................................................................  Patient/Patient Representative Signature      ..........................................................................................................................................  Patient Representative Print Name and Relationship to Patient    ..................................................               ................................................  Date                                            Time    ..........................................................................................................................................  Reviewed by Signature/Title    ...................................................              ..............................................  Date                                                            Time

## 2018-07-06 NOTE — IP AVS SNAPSHOT
MRN:[id number]                      After Visit Summary   7/6/2018    Ada Welch    MRN: [id number]           Thank you!     Thank you for choosing [facility] for your care. Our goal is always to provide you with excellent care. Hearing back from our patients is one way we can continue to improve our services. Please take a few minutes to complete the written survey that you may receive in the mail after you visit with us. Thank you!        Patient Information     Date Of Birth          1970        About your hospital stay     You were admitted on:  July 7, 2018 You last received care in the:  [address] [facility]    You were discharged on:  July 9, 2018        Reason for your hospital stay       You were admitted to the hospital for your migraine, nausea and vomiting.                  Who to Call     For medical emergencies, please call 911.  For non-urgent questions about your medical care, please call your primary care provider or clinic, [phone]          Attending Provider     Provider Specialty    Tad Mijares MD Emergency Medicine    [facility], Laurie Stone MD Emergency Medicine    [facility], Mikael Ballesteros MD Neurology       Primary Care Provider Office Phone # Fax #    Ellyn Rivera [phone]      After Care Instructions     Activity       Your activity upon discharge: activity as tolerated            Diet       Follow this diet upon discharge: Orders Placed This Encounter      Regular Diet Adult            Discharge Instructions       - Take Reglan with Bendaryl for nausea.  - Avoid taking Reglan and Zyprexa together.                  Follow-up Appointments     Adult [facility] Follow-up and recommended labs and tests       Follow up with primary care provider, Ellyn Rivera, within 14 days for hospital follow- up.  No follow up labs or test are needed.      Follow up with your scheduled appointments with Neurology and with the Pain Clinic.      Appointments on [facility] and/or [facility] (with [facility] or [facility] provider or service). Call [phone] if you haven't heard regarding these appointments within 7 days of discharge.                  Your next 10 appointments already scheduled     Jul 16, 2018  9:20 AM CDT   (Arrive by 9:05 AM)   NEW TUMOR VISIT with Karla Killian MD   [facility] ([facility] [place])    [address]  4th Floor  [facility] [address]   [phone]            Jul 17, 2018 10:30 AM CDT   (Arrive by 10:15 AM)   Return Visit with Noemy Nguyen MD   [facility] Neurology ([facility])    [address]  3rd Floor  [facility] [address]   [phone]            Jul 17, 2018 12:30 PM CDT   RETURN CLOTTING DISORDER with Cynthia Leach MD   [facility] ([facility])    [address]  [address]  [facility] [address] [phone]            Jul 23, 2018 12:15 PM CDT   (Arrive by 12:00 PM)   New Allergy with Vahe Rodriguez MD   [facility] ([facility])    [address] [place] [address]  [address]  [facility] [id number] [phone]           Do not take anti-histamines or Zantac for seven day prior to your appointment.            Aug 08, 2018  2:15 PM CDT   (Arrive by 2:00 PM)   Return Visit with Wade Singh MD   [facility] ([facility])    [address] [place] [address]  [address]th [facility] [address] [phone]            Aug 08, 2018  4:20 PM CDT   (Arrive by 4:05 PM)   Return Visit with Juana Griffith MD   [facility] ([facility])    [address] [place] [address]  [address]th [facility] [address]   [phone]            Aug 13, 2018  1:40 PM CDT   (Arrive by 1:25 PM)    NEW THROAT with Seema Eubanks MD   [facility] ([facility] [place])    [address]  4th Floor  [facility] [address] [phone]           This is a multi-disciplinary care team visit as patients with your type of problem are usually seen by a team of an MD and a Speech-Language Pathologist (who is a specialist in disorders of the voice, throat, and breathing).  Please plan about 2 hours for your visit, which will likely include Laryngeal Function Studies, a Voice/Swallow/Breathing Evaluation, and an Endoscopic Laryngeal Examination to provide a comprehensive evaluation.  Please check with your insurance company to ensure you are covered for these services. - It is important to know that if you are evaluated and/or treated by both a physician and a speech pathologist during your visit, your billing will reflect the input that you receive from both providers as separate professionals. Although most insurance plans do cover these services, we encourage you to contact your insurance company prior to your visit to determine whether there are any coverage limitations that might affect you financially. - Billing/procedure codes that are frequently associated with visits to our clinic include (but are not limited to) the ones listed below. Most patients will not need all of these items, but it may be useful to ask your insurance company's patient . 54639: Flexible fiberoptic laryngoscopy, 72102: Laryngoscopy; flexible or rigid fiberoptic, with stroboscopy, 74003: Flexible endoscopic evaluation of swallowing, 33774: Laryngeal function aerodynamic evaluation, 71521: Evaluation of Voice and Resonance, 93106: Speech pathology treatment for voice, speech, communication, 11192: Speech pathology treatment for swallowing/oral function for feeding - If you have any concerns or questions, or if you would prefer not to have a speech pathologist  "involved in your visit, please contact our Clinic Coordinator at [phone], at least 24 hours prior to your appointment.            Aug 13, 2018  1:40 PM CDT   (Arrive by 1:25 PM)   [facility] Speech Pathology and ENT Evaluation with  Ent Dysphonia Slp Provider   [facility] ([facility])    [address] [place] [address]   [phone]            Aug 15, 2018  2:45 PM CDT   (Arrive by 2:30 PM)   Return Visit with Zoe Barahona MD   [facility] ([facility])    [address] [place] [address]   [phone]            Aug 22, 2018  3:40 PM CDT   (Arrive by 3:25 PM)   Return Visit with Juana Griffith MD   [facility] ([facility])    [address] [place] [address]   [phone]              Pending Results     No orders found from 7/4/2018 to 7/7/2018.            Statement of Approval     Ordered          07/09/18 1044  I have reviewed and agree with all the recommendations and orders detailed in this document.  EFFECTIVE NOW     Approved and electronically signed by:  Holly Henderson MD             Admission Information     Date & Time Provider Department Dept. Phone    7/6/2018 Mikael Rodrigez MD [address] [facility] [phone]      Your Vitals Were     Blood Pressure Pulse Temperature Respirations Height Weight    138/98 (BP Location: Right arm) 61 98.8  F (37.1  C) (Oral) 18 1.689 m (5' 6.5\") 94.9 kg (209 lb 3.5 oz)    Last Period Pulse Oximetry BMI (Body Mass Index)             06/10/2018 (Approximate) 99% 33.26 kg/m2         MyChart Information     [employer] gives you secure access to your electronic health record. If you see a primary care provider, you can also send messages to your care team and make appointments. If you have questions, please call your primary care clinic.  If you do not have a " primary care provider, please call [phone] and they will assist you.        Care EveryWhere ID     This is your Care EveryWhere ID. This could be used by other organizations to access your [facility] medical records  [id number]        Equal Access to Services     CHRISTINA GUY : Hadii aad ku hadkokochela Duranali, warejida luqadaha, qashenata kaalmada martyclement, odette charley krisjuanjose hutsonisamargrant tang. So [facility] [phone].    ATENCIÓN: Si habla español, tiene a sweeney disposición servicios gratuitos de asistencia lingüística. Llame al [phone].    We comply with applicable federal civil rights laws and [place] laws. We do not discriminate on the basis of race, color, national origin, age, disability, sex, sexual orientation, or gender identity.               Review of your medicines      START taking        Dose / Directions    OLANZapine 5 MG tablet   Commonly known as:  zyPREXA        Dose:  5 mg   Take 1 tablet (5 mg) by mouth daily as needed (headache pain, nausea)   Quantity:  10 tablet   Refills:  0         CONTINUE these medicines which have NOT CHANGED        Dose / Directions    ACETAMINOPHEN PO        Dose:  1000 mg   Take 1,000 mg by mouth every 6 hours as needed for pain Take with tramadol   Refills:  0       ADVAIR DISKUS 250-50 MCG/DOSE diskus inhaler   Generic drug:  fluticasone-salmeterol        Dose:  1 puff   Inhale 1 puff into the lungs 2 times daily as needed   Refills:  0       albuterol 108 (90 Base) MCG/ACT Inhaler   Commonly known as:  PROAIR HFA/PROVENTIL HFA/VENTOLIN HFA        Dose:  2 puff   Inhale 2 puffs into the lungs every 6 hours as needed   Refills:  0       AMITRIPTYLINE HCL PO        Take 40 mg by mouth daily for two weeks, then increase to 50 mg daily.   Refills:  0       buprenorphine 10 MCG/HR WK patch   Commonly known as:  BUTRANS   Used for:  Postlaminectomy syndrome of lumbar region        Dose:  1 patch   Place 1 patch onto the skin every 7 days   Quantity:  4 patch    Refills:  2       butalbital-acetaminophen-caffeine -40 MG per tablet   Commonly known as:  FIORICET/ESGIC        TAKE ONE TABLET BY MOUTH EVERY 4 HOURS AS NEEDED   Refills:  0       cetirizine 10 MG tablet   Commonly known as:  zyrTEC        Dose:  10 mg   Take 10 mg by mouth daily   Refills:  0       COMPRESSION STOCKINGS   Used for:  Orthostatic hypotension        Dose:  1 each   1 each daily 20-30 mmHg Thigh Length measure and fit, color and style per patient preference. Doff n jose per need.   Quantity:  3 each   Refills:  3       DULOXETINE HCL PO        Take 60 mg by mouth in the morning and 30 mg in the evening.   Refills:  0       eletriptan 20 MG tablet   Commonly known as:  RELPAX   Used for:  Chronic daily headache, Intractable migraine without aura and with status migrainosus        Dose:  20 mg   Take 1 tablet (20 mg) by mouth at onset of headache for migraine May repeat in 2 hours. Max 4 tablets/24 hours.   Quantity:  18 tablet   Refills:  1       EPINEPHrine 0.3 MG/0.3ML injection 2-pack   Commonly known as:  EPIPEN/ADRENACLICK/or ANY BX GENERIC EQUIV        Dose:  0.3 mg   Inject 0.3 mg into the muscle   Refills:  0       famotidine 20 MG tablet   Commonly known as:  PEPCID   Used for:  Hiatal hernia, Mast cell disorder        Dose:  20 mg   Take 1 tablet (20 mg) by mouth At Bedtime   Quantity:  90 tablet   Refills:  1       fludrocortisone 0.1 MG tablet   Commonly known as:  FLORINEF   Used for:  Autonomic dysfunction, Pre-syncope        Dose:  0.2 mg   Take 2 tablets (0.2 mg) by mouth daily   Quantity:  180 tablet   Refills:  3       fluticasone 50 MCG/ACT spray   Commonly known as:  FLONASE   Used for:  Chronic rhinitis, unspecified type        INSTILL 2 SPRAYS INTO BOTH NOSTRILS DAILY   Quantity:  16 mL   Refills:  3       linaclotide 145 MCG capsule   Commonly known as:  LINZESS   Used for:  Chronic idiopathic constipation        Dose:  145 mcg   Take 1 capsule (145 mcg) by mouth  every morning (before breakfast) Last refill until GI clinic follow up.   Quantity:  30 capsule   Refills:  0       medical cannabis (Patient's own supply.  Not a prescription)        Dose:  1 Dose   Take 1 Dose by mouth See Admin Instructions (This is NOT a prescription, and does not certify that the patient has a qualifying medical condition for medical cannabis.  The purpose of this order is  to document that the patient reports taking medical cannabis.)   Refills:  0       METOCLOPRAMIDE HCL PO        Dose:  5-10 mg   Take 5-10 mg by mouth every 6 hours as needed   Refills:  0       MULTIVITAMIN PO        Dose:  1 tablet   Take 1 tablet by mouth daily   Refills:  0       omeprazole 20 MG CR capsule   Commonly known as:  priLOSEC   Used for:  Hiatal hernia        Dose:  20 mg   Take 1 capsule (20 mg) by mouth daily   Quantity:  90 capsule   Refills:  2       ondansetron 4 MG ODT tab   Commonly known as:  ZOFRAN-ODT   Used for:  Migraine without status migrainosus, not intractable, unspecified migraine type        Dose:  4-8 mg   Take 1-2 tablets (4-8 mg) by mouth every 12 hours as needed for nausea   Quantity:  60 tablet   Refills:  1       oxyCODONE IR 5 MG tablet   Commonly known as:  ROXICODONE   Used for:  Postlaminectomy syndrome        Dose:  2.5 mg   Take 0.5 tablets (2.5 mg) by mouth every 6 hours as needed for severe pain   Quantity:  15 tablet   Refills:  0       PROBIOTIC DAILY PO        Dose:  2 packet   Take 2 packets by mouth every morning   Refills:  0       Propranolol HCl SR Beads 80 MG Cp24   Used for:  Sinus tachycardia        Dose:  80 mg   Take 80 mg by mouth daily   Quantity:  90 capsule   Refills:  3       rizatriptan 5 MG ODT tab   Commonly known as:  MAXALT-MLT        TAKE 1-2 TABLETS (5-10 MG) BY MOUTH AT ONSET OF HEADACHE FOR MIGRAINE (MAX 30 MG IN 24 HOURS)   Refills:  6       SUMAtriptan 6 MG/0.5ML pen injector kit   Commonly known as:  IMITREX STATDOSE   Used for:  Chronic migraine  without aura without status migrainosus, not intractable        Dose:  6 mg   Inject 0.5 mLs (6 mg) Subcutaneous at onset of headache for migraine (may repeat once after 2 hrs) May repeat in 1 hour. Max 12 mg/24 hours.   Quantity:  2 kit   Refills:  1       SYNTHROID 25 MCG tablet   Generic drug:  levothyroxine        Dose:  25 mcg   Take 25 mcg by mouth daily   Refills:  0       tiZANidine 4 MG tablet   Commonly known as:  ZANAFLEX        Dose:  4 mg   Take 1 tablet (4 mg) by mouth 4 times daily as needed for muscle spasms   Quantity:  120 tablet   Refills:  3       traMADol 50 MG tablet   Commonly known as:  ULTRAM   Used for:  Postlaminectomy syndrome        Dose:  50 mg   Take 1 tablet (50 mg) by mouth every 6 hours as needed for severe pain   Quantity:  15 tablet   Refills:  0       VITAMIN D (CHOLECALCIFEROL) PO        Dose:  2000 Units   Take 2,000 Units by mouth daily   Refills:  0       ZONISAMIDE PO        Dose:  75 mg   Take 75 mg by mouth daily   Refills:  0            Where to get your medicines      Some of these will need a paper prescription and others can be bought over the counter. Ask your nurse if you have questions.     Bring a paper prescription for each of these medications     OLANZapine 5 MG tablet                Protect others around you: Learn how to safely use, store and throw away your medicines at www.disposemymeds.org.             Medication List: This is a list of all your medications and when to take them. Check marks below indicate your daily home schedule. Keep this list as a reference.      Medications           Morning Afternoon Evening Bedtime As Needed    ACETAMINOPHEN PO   Take 1,000 mg by mouth every 6 hours as needed for pain Take with tramadol   Last time this was given:  975 mg on 7/9/2018  8:27 AM                                ADVAIR DISKUS 250-50 MCG/DOSE diskus inhaler   Inhale 1 puff into the lungs 2 times daily as needed   Generic drug:  fluticasone-salmeterol                                 albuterol 108 (90 Base) MCG/ACT Inhaler   Commonly known as:  PROAIR HFA/PROVENTIL HFA/VENTOLIN HFA   Inhale 2 puffs into the lungs every 6 hours as needed                                AMITRIPTYLINE HCL PO   Take 40 mg by mouth daily for two weeks, then increase to 50 mg daily.   Last time this was given:  40 mg on 7/8/2018  9:36 PM                                buprenorphine 10 MCG/HR WK patch   Commonly known as:  BUTRANS   Place 1 patch onto the skin every 7 days   Last time this was given:  1 patch on 7/8/2018  1:54 AM                                butalbital-acetaminophen-caffeine -40 MG per tablet   Commonly known as:  FIORICET/ESGIC   TAKE ONE TABLET BY MOUTH EVERY 4 HOURS AS NEEDED                                cetirizine 10 MG tablet   Commonly known as:  zyrTEC   Take 10 mg by mouth daily   Last time this was given:  10 mg on 7/9/2018  8:28 AM                                COMPRESSION STOCKINGS   1 each daily 20-30 mmHg Thigh Length measure and fit, color and style per patient preference. Doshar n jose per need.                                DULOXETINE HCL PO   Take 60 mg by mouth in the morning and 30 mg in the evening.   Last time this was given:  60 mg on 7/9/2018  8:28 AM                                eletriptan 20 MG tablet   Commonly known as:  RELPAX   Take 1 tablet (20 mg) by mouth at onset of headache for migraine May repeat in 2 hours. Max 4 tablets/24 hours.                                EPINEPHrine 0.3 MG/0.3ML injection 2-pack   Commonly known as:  EPIPEN/ADRENACLICK/or ANY BX GENERIC EQUIV   Inject 0.3 mg into the muscle                                famotidine 20 MG tablet   Commonly known as:  PEPCID   Take 1 tablet (20 mg) by mouth At Bedtime   Last time this was given:  20 mg on 7/8/2018  9:36 PM                                fludrocortisone 0.1 MG tablet   Commonly known as:  FLORINEF   Take 2 tablets (0.2 mg) by mouth daily                                 fluticasone 50 MCG/ACT spray   Commonly known as:  FLONASE   INSTILL 2 SPRAYS INTO BOTH NOSTRILS DAILY   Last time this was given:  1 spray on 7/9/2018  8:28 AM                                linaclotide 145 MCG capsule   Commonly known as:  LINZESS   Take 1 capsule (145 mcg) by mouth every morning (before breakfast) Last refill until GI clinic follow up.                                medical cannabis (Patient's own supply.  Not a prescription)   Take 1 Dose by mouth See Admin Instructions (This is NOT a prescription, and does not certify that the patient has a qualifying medical condition for medical cannabis.  The purpose of this order is  to document that the patient reports taking medical cannabis.)                                METOCLOPRAMIDE HCL PO   Take 5-10 mg by mouth every 6 hours as needed                                MULTIVITAMIN PO   Take 1 tablet by mouth daily                                OLANZapine 5 MG tablet   Commonly known as:  zyPREXA   Take 1 tablet (5 mg) by mouth daily as needed (headache pain, nausea)   Last time this was given:  5 mg on 7/8/2018 10:23 PM                                omeprazole 20 MG CR capsule   Commonly known as:  priLOSEC   Take 1 capsule (20 mg) by mouth daily   Last time this was given:  20 mg on 7/9/2018  8:28 AM                                ondansetron 4 MG ODT tab   Commonly known as:  ZOFRAN-ODT   Take 1-2 tablets (4-8 mg) by mouth every 12 hours as needed for nausea                                oxyCODONE IR 5 MG tablet   Commonly known as:  ROXICODONE   Take 0.5 tablets (2.5 mg) by mouth every 6 hours as needed for severe pain                                PROBIOTIC DAILY PO   Take 2 packets by mouth every morning                                Propranolol HCl SR Beads 80 MG Cp24   Take 80 mg by mouth daily                                rizatriptan 5 MG ODT tab   Commonly known as:  MAXALT-MLT   TAKE 1-2 TABLETS (5-10 MG) BY MOUTH AT ONSET  OF HEADACHE FOR MIGRAINE (MAX 30 MG IN 24 HOURS)                                SUMAtriptan 6 MG/0.5ML pen injector kit   Commonly known as:  IMITREX STATDOSE   Inject 0.5 mLs (6 mg) Subcutaneous at onset of headache for migraine (may repeat once after 2 hrs) May repeat in 1 hour. Max 12 mg/24 hours.                                SYNTHROID 25 MCG tablet   Take 25 mcg by mouth daily   Last time this was given:  25 mcg on 7/9/2018  8:28 AM   Generic drug:  levothyroxine                                tiZANidine 4 MG tablet   Commonly known as:  ZANAFLEX   Take 1 tablet (4 mg) by mouth 4 times daily as needed for muscle spasms                                traMADol 50 MG tablet   Commonly known as:  ULTRAM   Take 1 tablet (50 mg) by mouth every 6 hours as needed for severe pain   Last time this was given:  50 mg on 7/9/2018 10:20 AM                                VITAMIN D (CHOLECALCIFEROL) PO   Take 2,000 Units by mouth daily                                ZONISAMIDE PO   Take 75 mg by mouth daily   Last time this was given:  150 mg on 7/7/2018 10:20 AM

## 2018-07-07 ENCOUNTER — APPOINTMENT (OUTPATIENT)
Dept: CT IMAGING | Facility: CLINIC | Age: 48
End: 2018-07-07
Attending: EMERGENCY MEDICINE
Payer: COMMERCIAL

## 2018-07-07 PROBLEM — G43.909 MIGRAINE: Status: ACTIVE | Noted: 2018-07-07

## 2018-07-07 LAB
ALBUMIN SERPL-MCNC: 3.8 G/DL (ref 3.4–5)
ALP SERPL-CCNC: 144 U/L (ref 40–150)
ALT SERPL W P-5'-P-CCNC: 30 U/L (ref 0–50)
ANION GAP SERPL CALCULATED.3IONS-SCNC: 10 MMOL/L (ref 3–14)
AST SERPL W P-5'-P-CCNC: 26 U/L (ref 0–45)
BASOPHILS # BLD AUTO: 0 10E9/L (ref 0–0.2)
BASOPHILS NFR BLD AUTO: 0.1 %
BILIRUB SERPL-MCNC: 0.4 MG/DL (ref 0.2–1.3)
BUN SERPL-MCNC: 10 MG/DL (ref 7–30)
CALCIUM SERPL-MCNC: 8 MG/DL (ref 8.5–10.1)
CHLORIDE SERPL-SCNC: 104 MMOL/L (ref 94–109)
CO2 SERPL-SCNC: 24 MMOL/L (ref 20–32)
CREAT SERPL-MCNC: 0.83 MG/DL (ref 0.52–1.04)
DIFFERENTIAL METHOD BLD: NORMAL
EOSINOPHIL # BLD AUTO: 0.4 10E9/L (ref 0–0.7)
EOSINOPHIL NFR BLD AUTO: 4.8 %
ERYTHROCYTE [DISTWIDTH] IN BLOOD BY AUTOMATED COUNT: 12.7 % (ref 10–15)
GFR SERPL CREATININE-BSD FRML MDRD: 74 ML/MIN/1.7M2
GLUCOSE SERPL-MCNC: 92 MG/DL (ref 70–99)
HCG UR QL: NEGATIVE
HCT VFR BLD AUTO: 37.1 % (ref 35–47)
HGB BLD-MCNC: 12.8 G/DL (ref 11.7–15.7)
IMM GRANULOCYTES # BLD: 0 10E9/L (ref 0–0.4)
IMM GRANULOCYTES NFR BLD: 0.3 %
INR PPP: 0.96 (ref 0.86–1.14)
LYMPHOCYTES # BLD AUTO: 3.3 10E9/L (ref 0.8–5.3)
LYMPHOCYTES NFR BLD AUTO: 41.6 %
MCH RBC QN AUTO: 30 PG (ref 26.5–33)
MCHC RBC AUTO-ENTMCNC: 34.5 G/DL (ref 31.5–36.5)
MCV RBC AUTO: 87 FL (ref 78–100)
MONOCYTES # BLD AUTO: 0.5 10E9/L (ref 0–1.3)
MONOCYTES NFR BLD AUTO: 6.7 %
NEUTROPHILS # BLD AUTO: 3.7 10E9/L (ref 1.6–8.3)
NEUTROPHILS NFR BLD AUTO: 46.5 %
NRBC # BLD AUTO: 0 10*3/UL
NRBC BLD AUTO-RTO: 0 /100
PLATELET # BLD AUTO: 233 10E9/L (ref 150–450)
POTASSIUM SERPL-SCNC: 3.8 MMOL/L (ref 3.4–5.3)
PROT SERPL-MCNC: 7.4 G/DL (ref 6.8–8.8)
RBC # BLD AUTO: 4.27 10E12/L (ref 3.8–5.2)
SODIUM SERPL-SCNC: 138 MMOL/L (ref 133–144)
WBC # BLD AUTO: 8 10E9/L (ref 4–11)

## 2018-07-07 PROCEDURE — 70450 CT HEAD/BRAIN W/O DYE: CPT

## 2018-07-07 PROCEDURE — 81025 URINE PREGNANCY TEST: CPT | Performed by: EMERGENCY MEDICINE

## 2018-07-07 PROCEDURE — 25000128 H RX IP 250 OP 636: Performed by: STUDENT IN AN ORGANIZED HEALTH CARE EDUCATION/TRAINING PROGRAM

## 2018-07-07 PROCEDURE — 12000008 ZZH R&B INTERMEDIATE UMMC

## 2018-07-07 PROCEDURE — 25000125 ZZHC RX 250: Performed by: INTERNAL MEDICINE

## 2018-07-07 PROCEDURE — 25000132 ZZH RX MED GY IP 250 OP 250 PS 637: Performed by: STUDENT IN AN ORGANIZED HEALTH CARE EDUCATION/TRAINING PROGRAM

## 2018-07-07 PROCEDURE — 25000132 ZZH RX MED GY IP 250 OP 250 PS 637: Performed by: INTERNAL MEDICINE

## 2018-07-07 PROCEDURE — 25000128 H RX IP 250 OP 636: Performed by: EMERGENCY MEDICINE

## 2018-07-07 RX ORDER — ACETAMINOPHEN 325 MG/1
975 TABLET ORAL EVERY 8 HOURS PRN
Status: DISCONTINUED | OUTPATIENT
Start: 2018-07-07 | End: 2018-07-09 | Stop reason: HOSPADM

## 2018-07-07 RX ORDER — METOCLOPRAMIDE HYDROCHLORIDE 5 MG/ML
10 INJECTION INTRAMUSCULAR; INTRAVENOUS ONCE
Status: COMPLETED | OUTPATIENT
Start: 2018-07-07 | End: 2018-07-07

## 2018-07-07 RX ORDER — DIHYDROERGOTAMINE MESYLATE 1 MG/ML
.25-1 INJECTION, SOLUTION INTRAMUSCULAR; INTRAVENOUS; SUBCUTANEOUS EVERY 8 HOURS
Status: DISCONTINUED | OUTPATIENT
Start: 2018-07-07 | End: 2018-07-09 | Stop reason: HOSPADM

## 2018-07-07 RX ORDER — OLANZAPINE 10 MG/2ML
10 INJECTION, POWDER, FOR SOLUTION INTRAMUSCULAR ONCE
Status: COMPLETED | OUTPATIENT
Start: 2018-07-07 | End: 2018-07-07

## 2018-07-07 RX ORDER — POTASSIUM CHLORIDE 7.45 MG/ML
10 INJECTION INTRAVENOUS
Status: DISCONTINUED | OUTPATIENT
Start: 2018-07-07 | End: 2018-07-09 | Stop reason: HOSPADM

## 2018-07-07 RX ORDER — DIHYDROERGOTAMINE MESYLATE 1 MG/ML
0.5 INJECTION, SOLUTION INTRAMUSCULAR; INTRAVENOUS; SUBCUTANEOUS
Status: DISCONTINUED | OUTPATIENT
Start: 2018-07-07 | End: 2018-07-09 | Stop reason: HOSPADM

## 2018-07-07 RX ORDER — NALOXONE HYDROCHLORIDE 0.4 MG/ML
.1-.4 INJECTION, SOLUTION INTRAMUSCULAR; INTRAVENOUS; SUBCUTANEOUS
Status: DISCONTINUED | OUTPATIENT
Start: 2018-07-07 | End: 2018-07-09 | Stop reason: HOSPADM

## 2018-07-07 RX ORDER — AMOXICILLIN 250 MG
2 CAPSULE ORAL 2 TIMES DAILY PRN
Status: DISCONTINUED | OUTPATIENT
Start: 2018-07-07 | End: 2018-07-09 | Stop reason: HOSPADM

## 2018-07-07 RX ORDER — CETIRIZINE HYDROCHLORIDE 10 MG/1
10 TABLET ORAL DAILY
Status: DISCONTINUED | OUTPATIENT
Start: 2018-07-07 | End: 2018-07-09 | Stop reason: HOSPADM

## 2018-07-07 RX ORDER — LEVOTHYROXINE SODIUM 25 UG/1
25 TABLET ORAL DAILY
Status: DISCONTINUED | OUTPATIENT
Start: 2018-07-07 | End: 2018-07-09 | Stop reason: HOSPADM

## 2018-07-07 RX ORDER — POTASSIUM CL/LIDO/0.9 % NACL 10MEQ/0.1L
10 INTRAVENOUS SOLUTION, PIGGYBACK (ML) INTRAVENOUS
Status: DISCONTINUED | OUTPATIENT
Start: 2018-07-07 | End: 2018-07-09 | Stop reason: HOSPADM

## 2018-07-07 RX ORDER — POTASSIUM CHLORIDE 1.5 G/1.58G
20-40 POWDER, FOR SOLUTION ORAL
Status: DISCONTINUED | OUTPATIENT
Start: 2018-07-07 | End: 2018-07-09 | Stop reason: HOSPADM

## 2018-07-07 RX ORDER — AMOXICILLIN 250 MG
1 CAPSULE ORAL 2 TIMES DAILY PRN
Status: DISCONTINUED | OUTPATIENT
Start: 2018-07-07 | End: 2018-07-09 | Stop reason: HOSPADM

## 2018-07-07 RX ORDER — OXYCODONE HYDROCHLORIDE 5 MG/1
5 TABLET ORAL EVERY 6 HOURS PRN
Status: DISCONTINUED | OUTPATIENT
Start: 2018-07-07 | End: 2018-07-07

## 2018-07-07 RX ORDER — METOCLOPRAMIDE HYDROCHLORIDE 5 MG/ML
10 INJECTION INTRAMUSCULAR; INTRAVENOUS EVERY 8 HOURS
Status: DISCONTINUED | OUTPATIENT
Start: 2018-07-07 | End: 2018-07-09 | Stop reason: HOSPADM

## 2018-07-07 RX ORDER — ALBUTEROL SULFATE 90 UG/1
2 AEROSOL, METERED RESPIRATORY (INHALATION) EVERY 4 HOURS PRN
Status: DISCONTINUED | OUTPATIENT
Start: 2018-07-07 | End: 2018-07-09 | Stop reason: HOSPADM

## 2018-07-07 RX ORDER — DULOXETIN HYDROCHLORIDE 60 MG/1
60 CAPSULE, DELAYED RELEASE ORAL DAILY
Status: DISCONTINUED | OUTPATIENT
Start: 2018-07-07 | End: 2018-07-09 | Stop reason: HOSPADM

## 2018-07-07 RX ORDER — POTASSIUM CHLORIDE 750 MG/1
20-40 TABLET, EXTENDED RELEASE ORAL
Status: DISCONTINUED | OUTPATIENT
Start: 2018-07-07 | End: 2018-07-09 | Stop reason: HOSPADM

## 2018-07-07 RX ORDER — SODIUM CHLORIDE 9 MG/ML
INJECTION, SOLUTION INTRAVENOUS CONTINUOUS
Status: DISCONTINUED | OUTPATIENT
Start: 2018-07-07 | End: 2018-07-09

## 2018-07-07 RX ORDER — FAMOTIDINE 20 MG/1
20 TABLET, FILM COATED ORAL AT BEDTIME
Status: DISCONTINUED | OUTPATIENT
Start: 2018-07-07 | End: 2018-07-09 | Stop reason: HOSPADM

## 2018-07-07 RX ORDER — BUPRENORPHINE 10 UG/H
1 PATCH TRANSDERMAL
Status: DISCONTINUED | OUTPATIENT
Start: 2018-07-07 | End: 2018-07-09 | Stop reason: HOSPADM

## 2018-07-07 RX ORDER — POTASSIUM CHLORIDE 29.8 MG/ML
20 INJECTION INTRAVENOUS
Status: DISCONTINUED | OUTPATIENT
Start: 2018-07-07 | End: 2018-07-09 | Stop reason: HOSPADM

## 2018-07-07 RX ORDER — ONDANSETRON 4 MG/1
4 TABLET, ORALLY DISINTEGRATING ORAL EVERY 6 HOURS PRN
Status: DISCONTINUED | OUTPATIENT
Start: 2018-07-07 | End: 2018-07-09 | Stop reason: HOSPADM

## 2018-07-07 RX ORDER — TRAMADOL HYDROCHLORIDE 50 MG/1
50 TABLET ORAL EVERY 6 HOURS PRN
Status: DISCONTINUED | OUTPATIENT
Start: 2018-07-07 | End: 2018-07-09 | Stop reason: HOSPADM

## 2018-07-07 RX ORDER — FLUTICASONE PROPIONATE 50 MCG
SPRAY, SUSPENSION (ML) NASAL
Qty: 16 ML | Refills: 3 | OUTPATIENT
Start: 2018-07-07

## 2018-07-07 RX ORDER — DIHYDROERGOTAMINE MESYLATE 1 MG/ML
0.5 INJECTION, SOLUTION INTRAMUSCULAR; INTRAVENOUS; SUBCUTANEOUS ONCE
Status: COMPLETED | OUTPATIENT
Start: 2018-07-07 | End: 2018-07-07

## 2018-07-07 RX ORDER — ONDANSETRON 2 MG/ML
4 INJECTION INTRAMUSCULAR; INTRAVENOUS EVERY 6 HOURS PRN
Status: DISCONTINUED | OUTPATIENT
Start: 2018-07-07 | End: 2018-07-09 | Stop reason: HOSPADM

## 2018-07-07 RX ORDER — LIDOCAINE 4 G/G
1 PATCH TOPICAL
Status: DISCONTINUED | OUTPATIENT
Start: 2018-07-07 | End: 2018-07-09 | Stop reason: HOSPADM

## 2018-07-07 RX ADMIN — METOCLOPRAMIDE HYDROCHLORIDE 10 MG: 5 INJECTION INTRAMUSCULAR; INTRAVENOUS at 03:17

## 2018-07-07 RX ADMIN — SODIUM CHLORIDE: 9 INJECTION, SOLUTION INTRAVENOUS at 09:55

## 2018-07-07 RX ADMIN — METOCLOPRAMIDE 10 MG: 5 INJECTION, SOLUTION INTRAMUSCULAR; INTRAVENOUS at 08:37

## 2018-07-07 RX ADMIN — SODIUM CHLORIDE 1000 ML: 900 INJECTION, SOLUTION INTRAVENOUS at 03:17

## 2018-07-07 RX ADMIN — TRAMADOL HYDROCHLORIDE 50 MG: 50 TABLET, COATED ORAL at 13:04

## 2018-07-07 RX ADMIN — CETIRIZINE HYDROCHLORIDE 10 MG: 10 TABLET, FILM COATED ORAL at 10:20

## 2018-07-07 RX ADMIN — DULOXETINE HYDROCHLORIDE 60 MG: 60 CAPSULE, DELAYED RELEASE ORAL at 10:21

## 2018-07-07 RX ADMIN — TRAMADOL HYDROCHLORIDE 50 MG: 50 TABLET, COATED ORAL at 21:19

## 2018-07-07 RX ADMIN — Medication 30 MG: at 21:19

## 2018-07-07 RX ADMIN — ZONISAMIDE 150 MG: 100 CAPSULE ORAL at 10:20

## 2018-07-07 RX ADMIN — DIHYDROERGOTAMINE MESYLATE 0.5 MG: 1 INJECTION, SOLUTION INTRAMUSCULAR; INTRAVENOUS; SUBCUTANEOUS at 10:22

## 2018-07-07 RX ADMIN — METOCLOPRAMIDE 10 MG: 5 INJECTION, SOLUTION INTRAMUSCULAR; INTRAVENOUS at 19:57

## 2018-07-07 RX ADMIN — FAMOTIDINE 20 MG: 20 TABLET ORAL at 21:19

## 2018-07-07 RX ADMIN — DIHYDROERGOTAMINE MESYLATE 0.75 MG: 1 INJECTION, SOLUTION INTRAMUSCULAR; INTRAVENOUS; SUBCUTANEOUS at 20:32

## 2018-07-07 RX ADMIN — OLANZAPINE 10 MG: 10 INJECTION, POWDER, LYOPHILIZED, FOR SOLUTION INTRAMUSCULAR at 16:23

## 2018-07-07 RX ADMIN — ACETAMINOPHEN 975 MG: 325 TABLET, FILM COATED ORAL at 13:04

## 2018-07-07 RX ADMIN — LIDOCAINE 1 PATCH: 560 PATCH PERCUTANEOUS; TOPICAL; TRANSDERMAL at 21:19

## 2018-07-07 RX ADMIN — LEVOTHYROXINE SODIUM 25 MCG: 25 TABLET ORAL at 10:20

## 2018-07-07 ASSESSMENT — PAIN DESCRIPTION - DESCRIPTORS: DESCRIPTORS: ACHING;POUNDING

## 2018-07-07 NOTE — ED NOTES
[facility], [place]   ED Nurse to Floor Handoff     Ada Welch is a 47 year old female who speaks English and lives with a spouse,  in a home  They arrived in the ED by car from home    ED Chief Complaint: Headache    ED Dx;   Final diagnoses:   Other migraine with status migrainosus, intractable         Needed?: No    Allergies:   Allergies   Allergen Reactions     Bee Venom Anaphylaxis, Difficulty breathing, Palpitations, Shortness Of Breath and Visual Disturbance     Patient does carry Epi-Pen     No Clinical Screening - See Comments Anaphylaxis     Chicken-Derived Products (Egg) Diarrhea and Nausea     Patient will self monitor  Other reaction(s): Nausea  Patient will self monitor  Other reaction(s): GI intolerance     Compazine [Prochlorperazine] Other (See Comments)     Extreme jittery     Gabapentin      Gluten Meal      Lactose Dermatitis and Diarrhea     Milk Digestant Nausea     Patient is aware of milk sensitivity, will manage own diet     Milk Protein Extract      Other reaction(s): GI intolerance\  EGGS      Topiramate      Wheat Diarrhea     Wheat Bran Nausea     Patient will self monitor  Other reaction(s): Nausea  Patient will self monitor   .  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     Chronic sinusitis 00/2007    Diagnosed with chronic pansinusitis 2016     Chronic tonsillitis 1980    Had tonsils removed 02/1981, rheumatic fever     Tom-Danlos disease      Gastroesophageal reflux disease 3/1/2017    I have large hiatal hernia     Hernia, abdominal 10/2016    Hiatal Hernia     Hiatal hernia 2016    Have adeline hiatel hernia     Hoarseness Unsure    It comes and goes      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     Migraines 1/1/2007    Unsure of exact date     Nasal polyps 2013    Were revoved 03/2017, benign     Other nervous system complications 1/2014    Autonomic nervous system disorder, neuralgia, neuropathy     Pneumonia Unsure    Have had pneumonia several times     Swelling, mass, or lump in head and neck 08/2016    Lymph node has been growing for last year     Thyroid disease 01/01/2008      Baseline Mental status: WDL  Current Mental Status changes: at basesline    Infection present or suspected this encounter: no  Sepsis suspected: No  Isolation type: No active isolations     Activity level - Baseline/Home:  Independent  Activity Level - Current:   Stand with Assist    Bariatric equipment needed?: No    In the ED these meds were given:   Medications   0.9% sodium chloride BOLUS (0 mLs Intravenous Stopped 7/6/18 2300)     Followed by   sodium chloride 0.9% infusion (1,000 mLs Intravenous Not Given 7/7/18 1137)   albuterol (PROAIR HFA/PROVENTIL HFA/VENTOLIN HFA) Inhaler 2 puff (not administered)   amitriptyline (ELAVIL) half-tab 30 mg (not administered)   DULoxetine (CYMBALTA) EC capsule 60 mg (60 mg Oral Given 7/7/18 1021)   levothyroxine (SYNTHROID/LEVOTHROID) tablet 25 mcg (25 mcg Oral Given 7/7/18 1020)   famotidine (PEPCID) tablet 20 mg (not administered)   traMADol (ULTRAM) tablet 50 mg (50 mg Oral Given 7/7/18 1304)   dihydroergotamine (DHE) injection 0.25-1 mg (not administered)   ondansetron (ZOFRAN) injection 4 mg (not administered)   naloxone (NARCAN) injection 0.1-0.4 mg (not administered)   melatonin tablet 1 mg (not administered)   sodium chloride 0.9% infusion ( Intravenous Rate/Dose Verify 7/7/18 1405)   potassium chloride SA (K-DUR/KLOR-CON M) CR tablet 20-40 mEq (not administered)   potassium chloride (KLOR-CON) Packet 20-40 mEq (not administered)   potassium chloride 10 mEq in 100 mL sterile water intermittent infusion  (premix) (not administered)   potassium chloride 10 mEq in 100 mL intermittent infusion with 10 mg lidocaine (not administered)   potassium chloride 20 mEq in 50 mL intermittent infusion (not administered)   acetaminophen (TYLENOL) tablet 975 mg (975 mg Oral Given 7/7/18 1304)   senna-docusate (SENOKOT-S;PERICOLACE) 8.6-50 MG per tablet 1 tablet (not administered)     Or   senna-docusate (SENOKOT-S;PERICOLACE) 8.6-50 MG per tablet 2 tablet (not administered)   ondansetron (ZOFRAN-ODT) ODT tab 4 mg (not administered)     Or   ondansetron (ZOFRAN) injection 4 mg (not administered)   cetirizine (zyrTEC) tablet 10 mg (10 mg Oral Given 7/7/18 1020)   dihydroergotamine (DHE) injection 0.5 mg (not administered)   metoclopramide (REGLAN) injection 10 mg (10 mg Intravenous Given 7/7/18 0837)   Lidocaine (LIDOCARE) 4 % Patch 1 patch (not administered)     And   lidocaine patch REMOVAL (not administered)     And   lidocaine patch in PLACE (not administered)   buprenorphine (BUTRANS) 10 MCG/HR WK patch 1 patch (not administered)   ketorolac (TORADOL) injection 30 mg (30 mg Intravenous Given 7/6/18 2220)   OLANZapine (zyPREXA) injection 10 mg (10 mg Intramuscular Given 7/6/18 2225)   metoclopramide (REGLAN) injection 10 mg (10 mg Intravenous Given 7/7/18 0317)   0.9% sodium chloride BOLUS (0 mLs Intravenous Stopped 7/7/18 0415)   dihydroergotamine (DHE) injection 0.5 mg (0.5 mg Intravenous Given 7/7/18 1022)   zonisamide (ZONEGRAN) capsule 150 mg (150 mg Oral Given 7/7/18 1020)   OLANZapine (zyPREXA) injection 10 mg (10 mg Intramuscular Given 7/7/18 1623)       Drips running?  Yes. 100mL/hr NS    Home pump  No    Current LDAs  Peripheral IV 07/06/18 Right Lower forearm (Active)   Site Assessment WDL 7/6/2018 10:23 PM   Number of days:1       Labs results:   Labs Ordered and Resulted from Time of ED Arrival Up to the Time of Departure from the ED   COMPREHENSIVE METABOLIC PANEL - Abnormal; Notable for the following:        Result  "Value    Calcium 8.0 (*)     All other components within normal limits   HCG QUALITATIVE URINE   CBC WITH PLATELETS DIFFERENTIAL   INR   BLOOD PRESSURE   IP ASSIGN PROVIDER TEAM TO TREATMENT TEAM   VITAL SIGNS   NO INDWELLING URINARY CATHETER (MOORE) PRESENT OR NEEDED   BLADDER SCAN   NEURO CHECKS   APPLY PNEUMATIC COMPRESSION DEVICE (PCD)   CARDIAC CONTINUOUS MONITORING       Imaging Studies:   Recent Results (from the past 24 hour(s))   Head CT w/o contrast    Narrative    CT HEAD W/O CONTRAST 7/7/2018 1:38 AM    History: headache;     Comparison: MRI on 6/2/2018.    Technique: Using multidetector thin collimation helical acquisition  technique, axial, coronal and sagittal CT images from the skull base  to the vertex were obtained without intravenous contrast.     Findings:    No intracranial hemorrhage, mass effect, or midline shift. The  ventricles are proportionate to the cerebral sulci. The gray to white  matter differentiation of the cerebral hemispheres is preserved. The  basal cisterns are patent.    Bilateral maxillary and ethmoid sinus mucosal thickening with frothy  debris within the left maxillary sinus. The mastoid air cells are  clear.       Impression    Impression:  1. No acute intracranial pathology.  2. Bilateral maxillary and ethmoid sinus mucosal thickening with  frothy debris within the left maxillary sinus, suggestive of acute  sinusitis.    I have personally reviewed the examination and initial interpretation  and I agree with the findings.    WILLIAM BARBOSA MD       Recent vital signs:   /84  Pulse 61  Temp 98.9  F (37.2  C) (Oral)  Resp 16  Ht 1.676 m (5' 6\")  Wt 94.5 kg (208 lb 4.8 oz)  LMP 06/10/2018 (Approximate)  SpO2 100%  Breastfeeding? No  BMI 33.62 kg/m2    Cardiac Rhythm: Normal Sinus, occasional bradycardia 50-60s  Pt needs tele? Yes  Skin/wound Issues: None    Code Status: Full Code    Pain control: fair    Nausea control: fair    Abnormal labs/tests/findings " requiring intervention: None    Family present during ED course? Yes .  [place]  Family Comments/Social Situation comments:  attentive and supportive    Tasks needing completion: DHE due at 1830    Gege Gray RN  [facility]-- [phone] [place] ED  [phone] [place] ED

## 2018-07-07 NOTE — PROGRESS NOTES
"  /75 (BP Location: Left arm)  Pulse 61  Temp 97.8  F (36.6  C) (Oral)  Resp 14  Ht 1.689 m (5' 6.5\")  Wt 94.9 kg (209 lb 3.5 oz)  LMP 06/10/2018 (Approximate)  SpO2 96%  Breastfeeding? No  BMI 33.26 kg/m2    Pt arrived on the unit from ED after 1800. A&Ox4. A/VSS on RA. Tele in place, intermittent bradycardia. Pt to receive DHE medication for migraine later this lydia. Follow administration protocol, monitor cardiac status and pre-medicate with Reglan. Pt requested regular diet, diet advanced. Continue with POC.   "

## 2018-07-07 NOTE — ED PROVIDER NOTES
The patient was accepted at shift change signout with a plan for reassessment of pain status after the Zyprexa.  She states her pain was slightly better, though ongoing.  Per plan at the time of signout, CT was obtained, neurology was consulted.  CT showed possible sinusitis, though no other acute findings.  Neurology was consulted, recommended a trial of a, which was done with only mild improvement.  They will plan admit now, for treatment of status migrainosus, with their DHE protocol.    Dictation Disclaimer: Some of this Note has been completed with voice-recognition dictation software. Although errors are generally corrected real-time, there is the potential for a rare error to be present in the completed chart.       Laurie Montes MD  07/07/18 [id number]

## 2018-07-07 NOTE — ED TRIAGE NOTES
Patient presents to triage with migraine x 3 weeks. Migraine is accompanied by photophobia, nausea, dizziness. Pt has been taking Maxalt, Tylenol, tramadol, oxycodone, and eletriptan at home without relief. Pt reports that the last time this happened she was hospitalized for a week.

## 2018-07-07 NOTE — ED NOTES
Paged neurology to request order for Butrans patch and to notify that patient does not want to take oxycodone but would prefer IV analgesic d/t poor appetite (reports she does not absorb oral meds well without food).

## 2018-07-07 NOTE — ED NOTES
[facility], [place]   ED Nurse to Floor Handoff     Ada Welch is a 47 year old female who speaks English and lives with a spouse,  in a home  They arrived in the ED by car from home    ED Chief Complaint: Headache    ED Dx;   Final diagnoses:   Other migraine with status migrainosus, intractable         Needed?: No    Allergies:   Allergies   Allergen Reactions     Bee Venom Anaphylaxis, Difficulty breathing, Palpitations, Shortness Of Breath and Visual Disturbance     Patient does carry Epi-Pen     No Clinical Screening - See Comments Anaphylaxis     Chicken-Derived Products (Egg) Diarrhea and Nausea     Patient will self monitor  Other reaction(s): Nausea  Patient will self monitor  Other reaction(s): GI intolerance     Compazine [Prochlorperazine] Other (See Comments)     Extreme jittery     Gabapentin      Gluten Meal      Lactose Dermatitis and Diarrhea     Milk Digestant Nausea     Patient is aware of milk sensitivity, will manage own diet     Milk Protein Extract      Other reaction(s): GI intolerance\  EGGS      Topiramate      Wheat Diarrhea     Wheat Bran Nausea     Patient will self monitor  Other reaction(s): Nausea  Patient will self monitor   .  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     Chronic sinusitis 00/2007    Diagnosed with chronic pansinusitis 2016     Chronic tonsillitis 1980    Had tonsils removed 02/1981, rheumatic fever     Tom-Danlos disease      Gastroesophageal reflux disease 3/1/2017    I have large hiatal hernia     Hernia, abdominal 10/2016    Hiatal Hernia     Hiatal hernia 2016    Have adeline hiatel hernia     Hoarseness Unsure    It comes and goes      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     Migraines 1/1/2007    Unsure of exact date     Nasal polyps 2013    Were revoved 03/2017, benign     Other nervous system complications 1/2014    Autonomic nervous system disorder, neuralgia, neuropathy     Pneumonia Unsure    Have had pneumonia several times     Swelling, mass, or lump in head and neck 08/2016    Lymph node has been growing for last year     Thyroid disease 01/01/2008      Baseline Mental status: WDL  Current Mental Status changes: at basesline    Infection present or suspected this encounter: no  Sepsis suspected: No  Isolation type: No active isolations     Activity level - Baseline/Home:  Independent  Activity Level - Current:   Stand with Assist    Bariatric equipment needed?: No    In the ED these meds were given:   Medications   0.9% sodium chloride BOLUS (0 mLs Intravenous Stopped 7/6/18 2300)     Followed by   sodium chloride 0.9% infusion (not administered)   ketorolac (TORADOL) injection 30 mg (30 mg Intravenous Given 7/6/18 2220)   OLANZapine (zyPREXA) injection 10 mg (10 mg Intramuscular Given 7/6/18 2225)   metoclopramide (REGLAN) injection 10 mg (10 mg Intravenous Given 7/7/18 0317)   0.9% sodium chloride BOLUS (0 mLs Intravenous Stopped 7/7/18 0415)       Drips running?  No    Home pump  No    Current LDAs  Peripheral IV 07/06/18 Right Lower forearm (Active)   Site Assessment WDL 7/6/2018 10:23 PM   Number of days:1       Labs results:   Labs Ordered and Resulted from Time of ED Arrival Up to the Time of Departure from the ED   CBC WITH PLATELETS DIFFERENTIAL   COMPREHENSIVE METABOLIC PANEL   INR   HCG QUALITATIVE URINE       Imaging Studies:   Recent Results (from the past 24 hour(s))   Head CT w/o contrast    Narrative    Preliminary report:  This is a preliminary resident interpretation. Full report to follow.         Impression    Impression:  1. No acute intracranial pathology.  2. Bilateral maxillary sinus  "mucosal thickening with frothy debris  within the left maxillary sinus, suggestive of acute sinusitis.       Recent vital signs:   /81  Pulse 64  Temp 97.8  F (36.6  C) (Oral)  Resp 16  Ht 1.676 m (5' 6\")  Wt 94.5 kg (208 lb 4.8 oz)  LMP 06/10/2018 (Approximate)  SpO2 99%  Breastfeeding? No  BMI 33.62 kg/m2    Cardiac Rhythm: Normal Sinus  Pt needs tele? No  Skin/wound Issues: None    Code Status: Full Code    Pain control: poor    Nausea control: good    Abnormal labs/tests/findings requiring intervention: See Epic    Family present during ED course? Yes   Family Comments/Social Situation comments:     Tasks needing completion: None    Matias Vernon RN  [facility]-- [phone] [place] ED  [phone] [facility] ED      "

## 2018-07-07 NOTE — PHARMACY-ADMISSION MEDICATION HISTORY
"Admission medication history interview status for the 7/6/2018 admission is complete. See Epic admission navigator for allergy information, pharmacy, prior to admission medications and immunization status.     Medication history interview sources:  Patient, Surescripts, Chart Review      Changes made to PTA medication list (reason)    Added:   -Tylenol  -vitamin D3    Deleted:   -vitamin D2    Changed:   -amitriptyline dosing changed from 30 mg to 40 mg daily   -duloxetine dosing changed from 60 mg daily to 60 mg in the AM and 30 mg in the PM  -zonisamide dosing changed from 125 mg to 75 mg daily (changed per provider)      Additional medication history information (including reliability of information, actions taken by pharmacist):  -Patient knew her medications very well and was a reliable historian.   -Patient currently wearing Butrans patch that should be changed today (applied on 6/30).  -Patient reported that she prefers not to take fludrocortisone because it causes her to \"swell up\". Patient stated that propranolol is used for the same indication and works better without the same side effect.  -Patient currently waiting on refill of at home supply of Linzess and has not been taking for about 3 weeks, but this is a scheduled daily medication.  -Patient currently taking 40 mg amitriptyline daily for 2 weeks, to be increased to 50 mg daily on 7/13.   -Patient reported that she can only handle 75 mg of zonisamide daily, and that higher doses cause her to have kidney spasms. This dose change was directed by her provider.  -Patient using medical cannabis at home in capsule formulation. Patient reported that this medication helps her sleep and does help with some pain management. Patient did not bring at home supply with her.        Prior to Admission medications    Medication Sig Last Dose Taking? Auth Provider   ACETAMINOPHEN PO Take 1,000 mg by mouth every 6 hours as needed for pain Take with tramadol 7/5/2018 " Yes Unknown, Entered By History   AMITRIPTYLINE HCL PO Take 40 mg by mouth daily for two weeks, then increase to 50 mg daily. 7/5/2018 Yes Unknown, Entered By History   cetirizine (ZYRTEC) 10 MG tablet Take 10 mg by mouth daily  7/7/2018 at AM Yes Reported, Patient   DULOXETINE HCL PO Take 60 mg by mouth in the morning and 30 mg in the evening. 7/7/2018 at AM Yes Unknown, Entered By History   eletriptan (RELPAX) 20 MG tablet Take 1 tablet (20 mg) by mouth at onset of headache for migraine May repeat in 2 hours. Max 4 tablets/24 hours. Past Week at Unknown time Yes Fara Varner APRN CNP   fluticasone (FLONASE) 50 MCG/ACT spray INSTILL 2 SPRAYS INTO BOTH NOSTRILS DAILY 7/6/2018 at Unknown time Yes Ellyn Rivera MD   levothyroxine (SYNTHROID) 25 MCG tablet Take 25 mcg by mouth daily 7/7/2018 at AM Yes Reported, Patient   linaclotide (LINZESS) 145 MCG capsule Take 1 capsule (145 mcg) by mouth every morning (before breakfast) Last refill until GI clinic follow up. Past Month at Unknown time Yes Stephen Xiong PA-C   METOCLOPRAMIDE HCL PO Take 5-10 mg by mouth every 6 hours as needed 7/7/2018 at AM Yes Unknown, Entered By History   oxyCODONE IR (ROXICODONE) 5 MG tablet Take 0.5 tablets (2.5 mg) by mouth every 6 hours as needed for severe pain 7/6/2018 at Unknown time Yes Faiza Martinez MD   Probiotic Product (PROBIOTIC DAILY PO) Take 2 packets by mouth every morning  7/6/2018 at Unknown time Yes Reported, Patient   Propranolol HCl SR Beads 80 MG CP24 Take 80 mg by mouth daily 7/7/2018 at AM Yes Tiara Fletcher APRN CNP   rizatriptan (MAXALT-MLT) 5 MG ODT tab TAKE 1-2 TABLETS (5-10 MG) BY MOUTH AT ONSET OF HEADACHE FOR MIGRAINE (MAX 30 MG IN 24 HOURS) Past Week at Unknown time Yes Reported, Patient   tiZANidine (ZANAFLEX) 4 MG tablet Take 1 tablet (4 mg) by mouth 4 times daily as needed for muscle spasms 7/6/2018 at Unknown time Yes Faiza Martinez MD   VITAMIN D, CHOLECALCIFEROL, PO  Take 2,000 Units by mouth daily 7/5/2018 at Unknown time Yes Unknown, Entered By History   ZONISAMIDE PO Take 75 mg by mouth daily 7/6/2018 at Unknown time Yes Unknown, Entered By History   albuterol (PROAIR HFA/PROVENTIL HFA/VENTOLIN HFA) 108 (90 BASE) MCG/ACT Inhaler Inhale 2 puffs into the lungs every 6 hours as needed  More than a month at Unknown time  Reported, Patient   buprenorphine (BUTRANS) 10 MCG/HR WK patch Place 1 patch onto the skin every 7 days 6/30/2018  Faiza Martinez MD   butalbital-acetaminophen-caffeine (FIORICET/ESGIC) -40 MG per tablet TAKE ONE TABLET BY MOUTH EVERY 4 HOURS AS NEEDED More than a month at Unknown time  Reported, Patient   COMPRESSION STOCKINGS 1 each daily 20-30 mmHg Thigh Length measure and fit, color and style per patient preference. Doshar n jose per need.   Rey Tello MD   EPINEPHrine (EPIPEN/ADRENACLICK/OR ANY BX GENERIC EQUIV) 0.3 MG/0.3ML injection 2-pack Inject 0.3 mg into the muscle More than a month at Unknown time  Reported, Patient   famotidine (PEPCID) 20 MG tablet Take 1 tablet (20 mg) by mouth At Bedtime 7/5/2018  Ellyn Rivera MD   fludrocortisone (FLORINEF) 0.1 MG tablet Take 2 tablets (0.2 mg) by mouth daily 7/5/2018  Tiara Fletcher APRN CNP   fluticasone-salmeterol (ADVAIR DISKUS) 250-50 MCG/DOSE diskus inhaler Inhale 1 puff into the lungs 2 times daily as needed  More than a month at Unknown time  Reported, Patient   Multiple Vitamins-Minerals (MULTIVITAMIN PO) Take 1 tablet by mouth daily  7/5/2018  Reported, Patient   omeprazole (PRILOSEC) 20 MG CR capsule Take 1 capsule (20 mg) by mouth daily 7/6/2018 at AM  Ellyn Rivera MD   ondansetron (ZOFRAN-ODT) 4 MG ODT tab Take 1-2 tablets (4-8 mg) by mouth every 12 hours as needed for nausea More than a month at Unknown time  Ellyn Rivera MD   SUMAtriptan (IMITREX STATDOSE) 6 MG/0.5ML pen injector kit Inject 0.5 mLs (6 mg) Subcutaneous at onset of headache for  migraine (may repeat once after 2 hrs) May repeat in 1 hour. Max 12 mg/24 hours. More than a month at Unknown time  Ellyn Rivera MD   traMADol (ULTRAM) 50 MG tablet Take 1 tablet (50 mg) by mouth every 6 hours as needed for severe pain 7/5/2018  Faiza Martinez MD           Medication history completed by: Tiara Zelaya, Pharmacy student

## 2018-07-07 NOTE — ED PROVIDER NOTES
"    [place] EMERGENCY DEPARTMENT ([facility])  7/06/18   History     Chief Complaint   Patient presents with     Headache     HPI  Ada Welch is a 47 year old female with a medical history significant for EDS, complex history of chronic low back pain for nearly 24 years.  Patient underwent a L4-S1 fusion (TLIF) on 12/9/2013.  Postoperatively her back pain became much worse.  Patient then had removal of posterior instrumentation.  Patient also has a history of hypertension, migraines and anxiety.  Patient presents to the Emergency Department today for evaluation of a three-week history of a headache.  Patient has used her normal regimen for headache management, but has had no relief of her headache.  Patient states that the last time she had a headache this severe was 1 year ago in July 2017.  Patient reports that she was hospitalized at that time.  Patient reports that her headache has gotten severe to the point that she is having associated symptoms including numbness in her face, double vision, confusion, difficulty talking, dizziness, nausea, cottonmouth, chills,  and \"smelling pizza\".  Patient states that the majority of these symptoms are typical for when her headaches become this severe, except she has never had the sensation of \"smelling pizza\" in the past with her migraines.  The patient's  notes that she was slurring her speech today due to the severity of her headache, this has also occurred with previous headaches.  Patient also notes that she has had nausea over the past few days and states she has had a few episodes where she vomited into her mouth.  Patient has been using Reglan and Zofran at home with no significant improvement of her nausea.  She denies any recent trauma to the head and denies any recent dental, nasal procedures or other surgical procedures to her back or neck.  She denies any fevers.  She also denies being on any medications that suppress her immune " system.    I have reviewed the Medications, Allergies, Past Medical and Surgical History, and Social History in the [employer] system.    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     Chronic sinusitis 00/2007    Diagnosed with chronic pansinusitis 2016     Chronic tonsillitis 1980    Had tonsils removed 02/1981, rheumatic fever     Tom-Danlos disease      Gastroesophageal reflux disease 3/1/2017    I have large hiatal hernia     Hernia, abdominal 10/2016    Hiatal Hernia     Hiatal hernia 2016    Have adeline hiatel hernia     Hoarseness Unsure    It comes and goes     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 Remove screws/rods     BIOPSY  01/01/2008 & 3/2017    mole biopsy, lymph node biopsy     COLONOSCOPY  3/2017    Colonoscopy and GI     NASAL/SINUS POLYPECTOMY  2017    Polyps were benign     TONSILLECTOMY       TONSILLECTOMY  1981     TUBAL LIGATION         Family History   Problem Relation Age of Onset     Connective Tissue Disorder Mother      eds     Connective Tissue Disorder Sister      eds     Cancer Father      Spinal tumor grew into spinal  cord, went in brain     Migraines Father      Diabetes Maternal Grandmother      Elderly diabetes     HEART DISEASE Maternal Grandmother      Hypertension Maternal Grandmother      Diabetes Paternal Grandmother      Elderly diabetes     Diabetes Paternal Grandfather      Elderly diabetes     Asthma Sister      Pediatric Asthma     Migraines Sister      Asthma Son      Pediatric Asthma     Thyroid Disease Sister      ,     Migraines Sister      Migraines Sister      Breast Cancer No family hx of        Social History   Substance Use Topics     Smoking status: Former Smoker     Packs/day: 0.20     Years: 10.00     Types: Cigarettes     Start date: 1/1/1991     Quit date: 12/1/2013     Smokeless tobacco: Never Used      Comment: I somked on and off during specified dates.     Alcohol use Yes      Comment: Less than 5 drinks per year       Current Facility-Administered Medications   Medication     sodium chloride 0.9% infusion     Current Outpatient Prescriptions   Medication     albuterol (PROAIR HFA/PROVENTIL HFA/VENTOLIN HFA) 108 (90 BASE) MCG/ACT Inhaler     amitriptyline (ELAVIL) 10 MG tablet     buprenorphine (BUTRANS) 10 MCG/HR WK patch     butalbital-acetaminophen-caffeine (FIORICET/ESGIC) -40 MG per tablet     cetirizine (ZYRTEC) 10 MG tablet     COMPRESSION STOCKINGS     DULoxetine (CYMBALTA) 30 MG EC capsule     eletriptan (RELPAX) 20 MG tablet     EPINEPHrine (EPIPEN/ADRENACLICK/OR ANY BX GENERIC EQUIV) 0.3 MG/0.3ML injection 2-pack     famotidine (PEPCID) 20 MG tablet     fludrocortisone (FLORINEF) 0.1 MG tablet     fluticasone (FLONASE) 50 MCG/ACT spray     fluticasone-salmeterol (ADVAIR DISKUS) 250-50 MCG/DOSE diskus inhaler     levothyroxine (SYNTHROID) 25 MCG tablet     linaclotide (LINZESS) 145 MCG capsule     metoclopramide (REGLAN) 5 MG tablet     Multiple Vitamins-Minerals (MULTIVITAMIN PO)     omeprazole (PRILOSEC) 20 MG CR capsule     ondansetron (ZOFRAN-ODT) 4 MG ODT tab     ONE DAILY  "MULTIPLE VITAMIN OR     oxyCODONE IR (ROXICODONE) 5 MG tablet     Probiotic Product (PROBIOTIC DAILY PO)     Propranolol HCl SR Beads 80 MG CP24     rizatriptan (MAXALT-MLT) 5 MG ODT tab     SUMAtriptan (IMITREX STATDOSE) 6 MG/0.5ML pen injector kit     tiZANidine (ZANAFLEX) 4 MG tablet     traMADol (ULTRAM) 50 MG tablet     traMADol (ULTRAM) 50 MG tablet     vitamin D (ERGOCALCIFEROL) 80516 UNIT capsule     zonisamide (ZONEGRAN) 25 MG capsule        Allergies   Allergen Reactions     Bee Venom Anaphylaxis, Difficulty breathing, Palpitations, Shortness Of Breath and Visual Disturbance     Patient does carry Epi-Pen     No Clinical Screening - See Comments Anaphylaxis     Chicken-Derived Products (Egg) Diarrhea and Nausea     Patient will self monitor  Other reaction(s): Nausea  Patient will self monitor  Other reaction(s): GI intolerance     Compazine [Prochlorperazine] Other (See Comments)     Extreme jittery     Gabapentin      Gluten Meal      Lactose Dermatitis and Diarrhea     Milk Digestant Nausea     Patient is aware of milk sensitivity, will manage own diet     Milk Protein Extract      Other reaction(s): GI intolerance\  EGGS      Topiramate      Wheat Diarrhea     Wheat Bran Nausea     Patient will self monitor  Other reaction(s): Nausea  Patient will self monitor         Review of Systems   Constitutional: Positive for chills. Negative for fever.   HENT:        Positive for cottonmouth  Positive for abnormal smells   Eyes: Positive for visual disturbance (double).   Gastrointestinal: Positive for nausea and vomiting.   Musculoskeletal: Positive for neck stiffness.   Skin: Positive for rash (under chin).   Neurological: Positive for dizziness, speech difficulty, numbness (facial) and headaches.   Psychiatric/Behavioral: Positive for confusion.   All other systems reviewed and are negative.      Physical Exam   BP: 126/81  Pulse: 64  Temp: 97.8  F (36.6  C)  Resp: 16  Height: 167.6 cm (5' 6\")  Weight: 94.5 " kg (208 lb 4.8 oz)  SpO2: 100 %      Physical Exam   General: awake, alert, NAD  Head: normal cephalic  HEENT: pupils equal, conjugate gaze in tact, extraocular motions intact.  Neck: Supple  CV: regular rate and rhythm without murmur  Lungs: clear to ascultation  Abd: soft, non-tender, no guarding, no peritoneal signs  EXT: lower extremities without swelling or edema  Neuro: awake, answers questions appropriately. No focal deficits noted. CN II-XII intact, 5/5 strength in bilateral upper and lower extremities.  Normal fluent speech.  No word finding difficulty.  No slurred speech.  Normal thought processes.        ED Course   9:15 PM  The patient was seen and examined by Tad Mijares MD in Room ED03.     ED Course     Medications   0.9% sodium chloride BOLUS (1,000 mLs Intravenous New Bag 7/6/18 2219)     Followed by   sodium chloride 0.9% infusion (not administered)   ketorolac (TORADOL) injection 30 mg (30 mg Intravenous Given 7/6/18 2220)   OLANZapine (zyPREXA) injection 10 mg (10 mg Intramuscular Given 7/6/18 2225)       No results found for this or any previous visit (from the past 24 hour(s)).    Procedures    Labs Ordered and Resulted from Time of ED Arrival Up to the Time of Departure from the ED - No data to display         Assessments & Plan (with Medical Decision Making)   47-year-old female with very complicated past medical history including history of complex migraines who presents with ongoing migraine.  Patient states that she has had 3 weeks of symptoms without adequate relief with her home medications.Patient does report that she has had migraines similar to this in the past, she was actually admitted a year ago for similar presentation.      Given her history of chronic complex migraines and status migrainous in addition to the fact that she follows very closely and has had a lot of imaging done of her brain, I do not feel that any emergent imaging is warranted at this time.  Additionally, I do  not suspect that she has an infectious etiology as patient has had 3 weeks of symptoms.  She has no neck stiffness on exam, she is afebrile here in the Emergency Department.  She is not reporting fevers at home.  At this point, we will attempt symptomatic management and reassessment.  If unable to control her migraines while in the Emergency Department, we will consider imaging at that point and neurology consult.    Initial management included Zyprexa, Toradol and IV fluids.  On reassessment, patient was snoring soundly.  I discussed the case with her , he reports that she has not been sleeping well secondary to her ongoing headaches.  I feel that sleep probably would most benefit her; at this point, we will allow her to sleep and to reassess after couple hours.  If patient still has ongoing symptoms, at that point would recommend imaging and neurology consult.    She will be signed out to Dr. Montes who will follow up on symptomatic improvement.  Will image and consult neurology if no improvement of headache.    I have reviewed the nursing notes.    I have reviewed the findings, diagnosis, plan and need for follow up with the patient.    New Prescriptions    No medications on file       Final diagnoses:   Migraine with status migrainosus, not intractable, unspecified migraine type     I, Felix Edgar, am serving as a trained medical scribe to document services personally performed by Tad Mijares MD, based on the provider's statements to me.   I, Tad Mijares MD, was physically present and have reviewed and verified the accuracy of this note documented by Felix Edgar.    7/6/2018   [facility], [place], EMERGENCY DEPARTMENT     Tad Mijares MD  07/07/18 0050

## 2018-07-07 NOTE — H&P
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 She has had severe migraine for ~3 weeks and has 'run out of options' at home, thus prompting her to present to ED on evening of 7/6. Headaches is bifrontal, very severe and debilitating, associated w/ photophobia and nausea, has aura of 'tingling sensation over cheeks.' There is a postural quality- headache feels better while sitting up (previous w/u for low-P headache was negative). Her headache did not improve much with abortive therapies at home; last dose of rizatriptan was >24 hours ago (overnight 6/5/early morning 6/6). In ED, patient received IVF boluses, reglan, zyprexa and toradol, which helped with headache temporarily, but did not completely abort the headache. Patient has had DHE protocol in the setting of severe migraine in the past & she would like to try this again.    Patient has long-standing hx of chronic migraines; she follows in Neurology clinic regarding this, most recently Dr. Nguyen. She has had w/u for secondary causes of headache in the past including MRI/MRV, CSF analysis/opening pressures. She is on prophylactic meds including amitriptyline, duloxetine, and zonisamide. She also has abortive meds including triptans (multiple types listed on med list, but she reports taking rizatriptan most recently), tizanidine, reglan, fioracet, oxycodone. In the past, when she has severe migraine for several weeks (such as today), DHE therapy has been helpful. Last time she had DHE was 07/2017 at [facility]; she had SE of bradycardia during this treatment.    ROS: Negative except as stated above.    PMHx/PSHx:  Past Medical History:   Diagnosis Date     Allergic rhinitis 09/2007     Arthritis 11/01/2013    Found during search for cause of back pain     Autoimmune disease (H) 2016    Immunosuppresive     Bleeding disorder (H) 2016    Bruise easily      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     Chronic sinusitis 00/2007    Diagnosed with chronic pansinusitis 2016     Chronic tonsillitis 1980    Had tonsils removed 02/1981, rheumatic fever     Tom-Danlos disease      Gastroesophageal reflux disease 3/1/2017    I have large hiatal hernia     Hernia, abdominal 10/2016    Hiatal Hernia     Hiatal hernia 2016    Have adeline hiatel hernia     Hoarseness Unsure    It comes and goes     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 Remove screws/rods     BIOPSY  01/01/2008 & 3/2017    mole biopsy, lymph node biopsy     COLONOSCOPY  3/2017    Colonoscopy and GI     NASAL/SINUS POLYPECTOMY  2017    Polyps were benign     TONSILLECTOMY       TONSILLECTOMY  1981     TUBAL LIGATION         Medications:       No current facility-administered medications on file prior to encounter.   Current Outpatient Prescriptions on File Prior to Encounter:  albuterol (PROAIR HFA/PROVENTIL HFA/VENTOLIN HFA) 108 (90 BASE) MCG/ACT Inhaler Inhale 2 puffs into the lungs   amitriptyline (ELAVIL) 10 MG tablet Take 3 tablets (30 mg) by mouth At Bedtime   buprenorphine (BUTRANS) 10 MCG/HR WK patch Place 1 patch onto the skin every 7 days   butalbital-acetaminophen-caffeine (FIORICET/ESGIC) -40 MG per tablet TAKE ONE TABLET BY MOUTH EVERY 4 HOURS AS NEEDED    cetirizine (ZYRTEC) 10 MG tablet Take 10 mg by mouth daily    COMPRESSION STOCKINGS 1 each daily 20-30 mmHg Thigh Length measure and fit, color and style per patient preference. Antolin garcia per need.   DULoxetine (CYMBALTA) 30 MG EC capsule Take 2 capsules (60 mg) by mouth daily   eletriptan (RELPAX) 20 MG tablet Take 1 tablet (20 mg) by mouth at onset of headache for migraine May repeat in 2 hours. Max 4 tablets/24 hours.   EPINEPHrine (EPIPEN/ADRENACLICK/OR ANY BX GENERIC EQUIV) 0.3 MG/0.3ML injection 2-pack Inject 0.3 mg into the muscle   famotidine (PEPCID) 20 MG tablet Take 1 tablet (20 mg) by mouth At Bedtime   fludrocortisone (FLORINEF) 0.1 MG tablet Take 2 tablets (0.2 mg) by mouth daily   fluticasone (FLONASE) 50 MCG/ACT spray INSTILL 2 SPRAYS INTO BOTH NOSTRILS DAILY   fluticasone-salmeterol (ADVAIR DISKUS) 250-50 MCG/DOSE diskus inhaler Inhale 1 puff into the lungs 2 times daily as needed    levothyroxine (SYNTHROID) 25 MCG tablet Take 25 mcg by mouth daily   linaclotide (LINZESS) 145 MCG capsule Take 1 capsule (145 mcg) by mouth every morning (before breakfast) Last refill until GI clinic follow up.   metoclopramide (REGLAN) 5 MG tablet Take 1-2 tabs po Q 6 hrs as needed for nausea   Multiple Vitamins-Minerals (MULTIVITAMIN PO) Take by mouth daily   omeprazole (PRILOSEC) 20 MG CR capsule Take 1 capsule (20 mg) by mouth daily   ondansetron (ZOFRAN-ODT) 4 MG ODT tab Take 1-2 tablets (4-8 mg) by mouth every 12 hours as needed for nausea   ONE DAILY MULTIPLE VITAMIN OR    oxyCODONE IR (ROXICODONE) 5 MG tablet Take 0.5 tablets (2.5 mg) by mouth every 6 hours as needed for severe pain   Probiotic Product (PROBIOTIC DAILY PO) Take by mouth 2 times daily 2 packets daily   Propranolol HCl SR Beads 80 MG CP24 Take 80 mg by mouth daily   rizatriptan (MAXALT-MLT) 5 MG ODT tab TAKE 1-2 TABLETS (5-10 MG) BY MOUTH AT ONSET OF HEADACHE FOR MIGRAINE (MAX 30 MG IN 24 HOURS)   SUMAtriptan (IMITREX STATDOSE) 6 MG/0.5ML  "pen injector kit Inject 0.5 mLs (6 mg) Subcutaneous at onset of headache for migraine (may repeat once after 2 hrs) May repeat in 1 hour. Max 12 mg/24 hours.   tiZANidine (ZANAFLEX) 4 MG tablet Take 1 tablet (4 mg) by mouth 4 times daily as needed for muscle spasms   traMADol (ULTRAM) 50 MG tablet Take 1 tablet (50 mg) by mouth every 6 hours as needed for severe pain   traMADol (ULTRAM) 50 MG tablet Take 1 tablet (50 mg) by mouth every 6 hours as needed for pain maximum 4 tablet(s) per day   vitamin D (ERGOCALCIFEROL) 78970 UNIT capsule    zonisamide (ZONEGRAN) 25 MG capsule Take 125mg daily for 2 weeks, then 150mg         Allergies:  Bee venom; No clinical screening - see comments; Chicken-derived products (egg); Compazine [prochlorperazine]; Gabapentin; Gluten meal; Lactose; Milk digestant; Milk protein extract; Topiramate; Wheat; and Wheat bran    Social Hx: reviewed & non-contributory.    Family Hx: reviewed & non-contributory.    Exam:  /81  Pulse 64  Temp 97.8  F (36.6  C) (Oral)  Resp 16  Ht 1.676 m (5' 6\")  Wt 94.5 kg (208 lb 4.8 oz)  LMP 06/10/2018 (Approximate)  SpO2 99%  Breastfeeding? No  BMI 33.62 kg/m2    Gen: NAD, wearing sunglasses & lying in bed with lights off.  Neck: no meningismus  CV: RRR  Lungs: CTAB  Abd: soft  Ext: no pedal edema    Neuro:   Mental status: awake, fluent speech, follows commands.  Cranial nerves: PERRL, EOMI, no nystagmus, VF intact, sensation to LT intact in V1-3 distribution, symmetric facial expressions, no dysarthria, tongue protrusion midline, symmetric shoulder shrug.  Motor: normal tone, 5/5 strength throughout, no pronator drift.  Sensory: intact & symmetric to LT throughout.  Reflexes: 2+ symmetric throughout, down-going toes, no clonus.  Coordination: FNF without dysmetria.  Gait: did not assess.    I was not able to perform fundoscopic exam due to patient's photophobia.      Pertinent Data:  CHT- no abnormalities seen.    Assessment & Plan:    # " Status migrainosus.   With severe migraine-type headache for ~3 weeks. Not responding to abortive therapies at home. No signs on exam to suggest secondary headache syndrome & prior w/u for this has been negative. In the past, she has responded to DHE in this setting. Of note, last time she had DHE tx 07/2017, she developed side effect of bradycardia, so she needs to be monitored on telemetry. Last triptan dose was >24 hours ago.  - admit to neurology for treatment of migraine. Will initiate DHE protocol.  - continue home ppx meds: amitriptyline, zonisamide, duloxetine.  - pharmacy to perform med rec.  - reglan prn.    # Asthma & allergies- albuterol inh prn, zyrtec.  # reflux- famotidine.  # hypothyroid- levothyroxine.      FEN: regular diet.  DVT ppx: mechanical  Code: full    Disposition Plan   Expected discharge in 1-2 days to prior living arrangement once above w/u complete.     Entered: Asuncion Franks 07/07/2018, 5:51 AM         Patient seen & discussed with Staff Dr. Elia Franks  G[facility] Neurology    ATTESTATION  Patient seen and examined by me on July 7, 2018  Funduscopic exam reveals sharp discs bilaterally  Elissa with note above dated July 7, 2018 July 7, 2018

## 2018-07-08 LAB — INTERPRETATION ECG - MUSE: NORMAL

## 2018-07-08 PROCEDURE — 40000556 ZZH STATISTIC PERIPHERAL IV START W US GUIDANCE

## 2018-07-08 PROCEDURE — 25000132 ZZH RX MED GY IP 250 OP 250 PS 637: Performed by: PSYCHIATRY & NEUROLOGY

## 2018-07-08 PROCEDURE — 25000128 H RX IP 250 OP 636: Performed by: STUDENT IN AN ORGANIZED HEALTH CARE EDUCATION/TRAINING PROGRAM

## 2018-07-08 PROCEDURE — 12000008 ZZH R&B INTERMEDIATE UMMC

## 2018-07-08 PROCEDURE — 25000132 ZZH RX MED GY IP 250 OP 250 PS 637: Performed by: INTERNAL MEDICINE

## 2018-07-08 PROCEDURE — 25000128 H RX IP 250 OP 636: Performed by: PSYCHIATRY & NEUROLOGY

## 2018-07-08 PROCEDURE — 25000132 ZZH RX MED GY IP 250 OP 250 PS 637: Performed by: STUDENT IN AN ORGANIZED HEALTH CARE EDUCATION/TRAINING PROGRAM

## 2018-07-08 RX ORDER — PROPRANOLOL HCL 60 MG
60 CAPSULE, EXTENDED RELEASE 24HR ORAL DAILY
Status: DISCONTINUED | OUTPATIENT
Start: 2018-07-08 | End: 2018-07-09 | Stop reason: HOSPADM

## 2018-07-08 RX ORDER — OLANZAPINE 5 MG/1
5 TABLET ORAL DAILY PRN
Status: DISCONTINUED | OUTPATIENT
Start: 2018-07-08 | End: 2018-07-09 | Stop reason: HOSPADM

## 2018-07-08 RX ORDER — PROPRANOLOL HYDROCHLORIDE 80 MG/1
80 CAPSULE, EXTENDED RELEASE ORAL DAILY
Status: DISCONTINUED | OUTPATIENT
Start: 2018-07-08 | End: 2018-07-08

## 2018-07-08 RX ORDER — AMITRIPTYLINE HYDROCHLORIDE 10 MG/1
40 TABLET ORAL AT BEDTIME
Status: DISCONTINUED | OUTPATIENT
Start: 2018-07-08 | End: 2018-07-09 | Stop reason: HOSPADM

## 2018-07-08 RX ORDER — DULOXETIN HYDROCHLORIDE 30 MG/1
30 CAPSULE, DELAYED RELEASE ORAL AT BEDTIME
Status: DISCONTINUED | OUTPATIENT
Start: 2018-07-08 | End: 2018-07-09 | Stop reason: HOSPADM

## 2018-07-08 RX ORDER — FLUTICASONE PROPIONATE 50 MCG
1 SPRAY, SUSPENSION (ML) NASAL DAILY
Status: DISCONTINUED | OUTPATIENT
Start: 2018-07-08 | End: 2018-07-09 | Stop reason: HOSPADM

## 2018-07-08 RX ADMIN — METOCLOPRAMIDE 10 MG: 5 INJECTION, SOLUTION INTRAMUSCULAR; INTRAVENOUS at 03:39

## 2018-07-08 RX ADMIN — LEVOTHYROXINE SODIUM 25 MCG: 25 TABLET ORAL at 07:53

## 2018-07-08 RX ADMIN — FLUTICASONE PROPIONATE 1 SPRAY: 50 SPRAY, METERED NASAL at 11:13

## 2018-07-08 RX ADMIN — DIHYDROERGOTAMINE MESYLATE 0.75 MG: 1 INJECTION, SOLUTION INTRAMUSCULAR; INTRAVENOUS; SUBCUTANEOUS at 04:33

## 2018-07-08 RX ADMIN — TRAMADOL HYDROCHLORIDE 50 MG: 50 TABLET, COATED ORAL at 16:30

## 2018-07-08 RX ADMIN — DULOXETINE HYDROCHLORIDE 60 MG: 60 CAPSULE, DELAYED RELEASE ORAL at 07:53

## 2018-07-08 RX ADMIN — PROPRANOLOL HYDROCHLORIDE 60 MG: 60 CAPSULE, EXTENDED RELEASE ORAL at 11:14

## 2018-07-08 RX ADMIN — ONDANSETRON 4 MG: 2 INJECTION INTRAMUSCULAR; INTRAVENOUS at 08:11

## 2018-07-08 RX ADMIN — AMITRIPTYLINE HYDROCHLORIDE 40 MG: 10 TABLET, FILM COATED ORAL at 21:36

## 2018-07-08 RX ADMIN — OMEPRAZOLE 20 MG: 20 CAPSULE, DELAYED RELEASE ORAL at 11:14

## 2018-07-08 RX ADMIN — TRAMADOL HYDROCHLORIDE 50 MG: 50 TABLET, COATED ORAL at 09:05

## 2018-07-08 RX ADMIN — ONDANSETRON 4 MG: 2 INJECTION INTRAMUSCULAR; INTRAVENOUS at 01:11

## 2018-07-08 RX ADMIN — CETIRIZINE HYDROCHLORIDE 10 MG: 10 TABLET, FILM COATED ORAL at 07:53

## 2018-07-08 RX ADMIN — ONDANSETRON 4 MG: 2 INJECTION INTRAMUSCULAR; INTRAVENOUS at 22:33

## 2018-07-08 RX ADMIN — DIHYDROERGOTAMINE MESYLATE 0.5 MG: 1 INJECTION, SOLUTION INTRAMUSCULAR; INTRAVENOUS; SUBCUTANEOUS at 21:36

## 2018-07-08 RX ADMIN — METOCLOPRAMIDE 10 MG: 5 INJECTION, SOLUTION INTRAMUSCULAR; INTRAVENOUS at 12:41

## 2018-07-08 RX ADMIN — ACETAMINOPHEN 975 MG: 325 TABLET, FILM COATED ORAL at 19:54

## 2018-07-08 RX ADMIN — BUPRENORPHINE 1 PATCH: 10 PATCH, EXTENDED RELEASE TRANSDERMAL at 01:54

## 2018-07-08 RX ADMIN — SODIUM CHLORIDE: 9 INJECTION, SOLUTION INTRAVENOUS at 20:53

## 2018-07-08 RX ADMIN — FAMOTIDINE 20 MG: 20 TABLET ORAL at 21:36

## 2018-07-08 RX ADMIN — OLANZAPINE 5 MG: 5 TABLET, FILM COATED ORAL at 22:23

## 2018-07-08 RX ADMIN — METOCLOPRAMIDE 10 MG: 5 INJECTION, SOLUTION INTRAMUSCULAR; INTRAVENOUS at 19:54

## 2018-07-08 RX ADMIN — DIHYDROERGOTAMINE MESYLATE 0.75 MG: 1 INJECTION, SOLUTION INTRAMUSCULAR; INTRAVENOUS; SUBCUTANEOUS at 13:36

## 2018-07-08 RX ADMIN — LIDOCAINE 1 PATCH: 560 PATCH PERCUTANEOUS; TOPICAL; TRANSDERMAL at 19:54

## 2018-07-08 RX ADMIN — DULOXETINE HYDROCHLORIDE 30 MG: 30 CAPSULE, DELAYED RELEASE ORAL at 21:36

## 2018-07-08 RX ADMIN — ACETAMINOPHEN 975 MG: 325 TABLET, FILM COATED ORAL at 01:25

## 2018-07-08 ASSESSMENT — ACTIVITIES OF DAILY LIVING (ADL)
BATHING: 0-->INDEPENDENT
AMBULATION: 0-->INDEPENDENT
DRESS: 0-->INDEPENDENT
SWALLOWING: 0-->SWALLOWS FOODS/LIQUIDS WITHOUT DIFFICULTY
TRANSFERRING: 0-->INDEPENDENT
TOILETING: 0-->INDEPENDENT
FALL_HISTORY_WITHIN_LAST_SIX_MONTHS: NO
RETIRED_EATING: 0-->INDEPENDENT
RETIRED_COMMUNICATION: 0-->UNDERSTANDS/COMMUNICATES WITHOUT DIFFICULTY
COGNITION: 0 - NO COGNITION ISSUES REPORTED

## 2018-07-08 ASSESSMENT — PAIN DESCRIPTION - DESCRIPTORS: DESCRIPTORS: ACHING;POUNDING

## 2018-07-08 NOTE — PLAN OF CARE
Problem: Patient Care Overview  Goal: Plan of Care/Patient Progress Review  Outcome: No Change  Status: Pt admitted d/t headache with photophobia, nausea, and tingling sensation over cheeks.   VS: VSS. HTN for afternoon vitals, within parameters upon retake. On cardiac monitoring and HR range between  today. Pt reports she can has previously been bradycardic.   Neuros: A&Ox4. Photophobia. Intermittently sees flashing lights. Dizziness with tracking finger. BLE N/T  from fingers to wrist (baseline). LLE N/T from hip to knee (baseline). RLE N/T from toes to ankle (baseline). Tingling sensation over cheeks which was worse in the morning but better after PRN tramadol.   GI: Nausea partially controlled with scheduled reglan and PRN zofran. Voids spont multiple times during the day.   : No BM this shift. Tolerating regular diet and eating periodically. ?  IV: NS @ 50 mL/hr via PIV.   Activity: SBA with GB.  Pain: Headache partially controlled with and DHE protocol. 0.75 mg given for midday DHE protocol. PRN tramadol given this morning with some relief. Pt states Zyprexa helps with side effects of DHE protocol. States she would like to take the Zyprexa at bedtime. Pt discussed that she uses medical cannabis capsules at home for pain relief and sleep. Ordered security lock box which pt currently has at bedside.   Respiratory/Trach: LS clear.   Skin: Intact.   Plan of care: Will continue to monitor and follow POC.

## 2018-07-08 NOTE — PLAN OF CARE
Problem: Patient Care Overview  Goal: Plan of Care/Patient Progress Review  Outcome: No Change  Status: Pt admitted d/t headache with photophobia, nausea, and tingling sensation over cheeks.   VS: VSS. Denies any chest pain.     Neuros: A&Ox4. Photophobia. Intermittently sees flashing lights.  Double/blurry vision, per pt getting better. BLE N/T  from fingers to wrist (baseline). LLE N/T from toes to knee (baseline). RLE N/T from toes to ankle (baseline). Tingling sensation over cheeks.   GI: Regular diet. Nausea partially controlled with scheduled reglan and PRN zofran. Voids spont multiple times during overnight.   : No BM this shift.  ?  IV: NS @ 100mL/hr via PIV.   Activity: SBA  Pain: Headache partially controlled with PRN tylenol and DHE protocol. 0.75mg given for 0400 DHE protocol.   Respiratory/Trach: LS clear   Skin: intact    Plan of care: Will continue to monitor and follow POC.

## 2018-07-08 NOTE — PLAN OF CARE
Problem: Patient Care Overview  Goal: Plan of Care/Patient Progress Review  3040-9201: diastolic BP 90s, AOVSS on RA. Received tramadol x1 for pain, but pt stated it was inadequate. Team was paged for stronger medication, potentially IV toradol that pt received in ED and was effective. Lidocaine patch was also applied for pt. Regular diet, though pt's appetite is poor d/t a bit of nausea and pain. NS running at 100ml/hr through a R lower peripheral. Pt received reglan before DHE, as scheduled, and pt's DHE dose at 2030 was 0.75 per protocol guidelines. Voids adequate amounts, no BM reported. Up with line management. Will continue to monitor and follow POC.

## 2018-07-08 NOTE — PROGRESS NOTES
"[facility]  General Neurology Inpatient Progress Note  Patient Name:  Ada Welch  :  1970    MRN:  [id number]      Date of Admission: 2018        Today's Date: 2018                                                                              24 HOUR EVENTS:  - No acute events overnight  - Pain remains between 6-7 this am    SUBJECTIVE:  No acute events overnight. Ongoing headache as above. Does feel she has had some improvement on DHE protocol. No other new change. She is asking about her PTA meds and would like them restarted as possible.     OBJECTIVE:   VITAL SIGNS:   /76  Pulse 61  Temp 98.8  F (37.1  C) (Oral)  Resp 16  Ht 1.689 m (5' 6.5\")  Wt 94.9 kg (209 lb 3.5 oz)  LMP 06/10/2018 (Approximate)  SpO2 98%  Breastfeeding? No  BMI 33.26 kg/m2    PHYSICAL EXAMINATION:  General: adult in NAD, cooperative.   HEENT: NC/AT  Neck: supple.  Lungs: no audible wheezing  Heart: S1 S2 present  Abdomen: soft, non-tender.  Extremities: Trace edema in hand and feet, no cyanosis.  -----------  Neurological:  Mental Status: Awake and alert, cooperative and interacting appropriately. No difficulty following commands. Oriented. Speech fluent, clear and coherent. Naming, registration intact.   Cranial nerves: Visual fields intact. PERRL, EOMI, Facial sensation intact in all distributions. Facial movements symmetric. Uvula midline, palate elevation symmetric. Tongue protrusion midline with full lateral motion.   Motor: Tone normal. Muscle bulk symmetric and appropriate. No tremors or other involuntary movements observed. No pronator Drift.       Biceps Triceps Deltoid Hip flexor Knee extension Knee flexion Ankle extension Ankle flexion   Right 5 5 5 5 5 5 5 5 5   Left 5 5 5 5 5 5 5 5 5     Reflexes:     Brachioradialis Biceps Patellar Achilles   Right 2+ 2+ 2+ 2+   Left 2+ 2+ 2+ 2+     Reflexes: 2+ and symmetric throughout upper and lower extremities. No " clonus. Toes mute.  Sensory:  Normal to light touch throughout upper and lower extremities.   Coordination:  FNF and HS intact without dysmetria.   Station and Gait: Deferred        PERTINENT INVESTIGATIONS: All labs and imaging since admission reviewed.      ASSESSMENT/PLAN:  47 year old female, history of chronic pain, chronic migraine, and Ehler-Danlos syndrome, who was admitted on 7/7 with status migrainosus. Plan for DHE protocol with partial response to therapies thus far. No new change this am.     # Chronic migraine disorder  # Status migrainosus:  RFA. Severe migraine-type headache for ~3 weeks prior to admission. Was not responding to abortive therapies at home. Has responded to DHE in the past.   - Continue DHE protocol  - PTA amitriptyline, ordered at 40mg HS  - PTA duloxetine, ordered at EC 60mg daily and 30mg HS  - PTA propranolol, ordered at 60mg daily (decreased dose)  - Zyprexa PRN    Chronic Medical Issues:  ## Hypothyroidism: PTA levothyroxine  ## Reflux: Famotidine  ## Ashtma, Allergies: Albuterol PRN, Zyrtec,       FEN:   -Fluids: NS@125mL/hr after boluses   -Electrolytes: Replacement PRN  -Nutrition: Regular diet  Lines:   - IV access: Present  - Loera: none  - NG tube: none  - A-Line: none  - Peg tube: none  Consults: Pain management   Activity: Up ad steve   Prophylaxis: SCDs  Code: Full  Dispo: 6A    Patient seen and discussed with neurology attending, Dr. Elia Painter MD  Neurology PGY4  P: [id number]    ATTESTATION  Patient seen and examined by me on July 8, 2018  Agree with note above dated July 8, 2018 July 8, 2018

## 2018-07-09 ENCOUNTER — MYC REFILL (OUTPATIENT)
Dept: INTERNAL MEDICINE | Facility: CLINIC | Age: 48
End: 2018-07-09

## 2018-07-09 ENCOUNTER — MEDICAL CORRESPONDENCE (OUTPATIENT)
Dept: HEALTH INFORMATION MANAGEMENT | Facility: CLINIC | Age: 48
End: 2018-07-09

## 2018-07-09 VITALS
WEIGHT: 209.22 LBS | HEART RATE: 61 BPM | TEMPERATURE: 98.8 F | HEIGHT: 67 IN | DIASTOLIC BLOOD PRESSURE: 88 MMHG | SYSTOLIC BLOOD PRESSURE: 132 MMHG | OXYGEN SATURATION: 99 % | RESPIRATION RATE: 18 BRPM | BODY MASS INDEX: 32.84 KG/M2

## 2018-07-09 DIAGNOSIS — G43.709 CHRONIC MIGRAINE WITHOUT AURA WITHOUT STATUS MIGRAINOSUS, NOT INTRACTABLE: ICD-10-CM

## 2018-07-09 PROCEDURE — 25000132 ZZH RX MED GY IP 250 OP 250 PS 637: Performed by: STUDENT IN AN ORGANIZED HEALTH CARE EDUCATION/TRAINING PROGRAM

## 2018-07-09 PROCEDURE — 99207 ZZC NO CHARGE COORDINATED CARE PS: CPT | Performed by: PHYSICIAN ASSISTANT

## 2018-07-09 PROCEDURE — 25000132 ZZH RX MED GY IP 250 OP 250 PS 637: Performed by: PSYCHIATRY & NEUROLOGY

## 2018-07-09 PROCEDURE — 25000128 H RX IP 250 OP 636: Performed by: STUDENT IN AN ORGANIZED HEALTH CARE EDUCATION/TRAINING PROGRAM

## 2018-07-09 RX ORDER — OLANZAPINE 5 MG/1
5 TABLET ORAL DAILY PRN
Qty: 10 TABLET | Refills: 0 | Status: SHIPPED | OUTPATIENT
Start: 2018-07-09 | End: 2018-07-17

## 2018-07-09 RX ADMIN — TRAMADOL HYDROCHLORIDE 50 MG: 50 TABLET, COATED ORAL at 04:20

## 2018-07-09 RX ADMIN — DIHYDROERGOTAMINE MESYLATE 0.75 MG: 1 INJECTION, SOLUTION INTRAMUSCULAR; INTRAVENOUS; SUBCUTANEOUS at 12:34

## 2018-07-09 RX ADMIN — CETIRIZINE HYDROCHLORIDE 10 MG: 10 TABLET, FILM COATED ORAL at 08:28

## 2018-07-09 RX ADMIN — LEVOTHYROXINE SODIUM 25 MCG: 25 TABLET ORAL at 08:28

## 2018-07-09 RX ADMIN — TRAMADOL HYDROCHLORIDE 50 MG: 50 TABLET, COATED ORAL at 10:20

## 2018-07-09 RX ADMIN — ACETAMINOPHEN 975 MG: 325 TABLET, FILM COATED ORAL at 08:27

## 2018-07-09 RX ADMIN — METOCLOPRAMIDE 10 MG: 5 INJECTION, SOLUTION INTRAMUSCULAR; INTRAVENOUS at 03:57

## 2018-07-09 RX ADMIN — DULOXETINE HYDROCHLORIDE 60 MG: 60 CAPSULE, DELAYED RELEASE ORAL at 08:28

## 2018-07-09 RX ADMIN — DIHYDROERGOTAMINE MESYLATE 0.75 MG: 1 INJECTION, SOLUTION INTRAMUSCULAR; INTRAVENOUS; SUBCUTANEOUS at 04:21

## 2018-07-09 RX ADMIN — OMEPRAZOLE 20 MG: 20 CAPSULE, DELAYED RELEASE ORAL at 08:28

## 2018-07-09 RX ADMIN — PROPRANOLOL HYDROCHLORIDE 60 MG: 60 CAPSULE, EXTENDED RELEASE ORAL at 08:28

## 2018-07-09 RX ADMIN — METOCLOPRAMIDE 10 MG: 5 INJECTION, SOLUTION INTRAMUSCULAR; INTRAVENOUS at 11:56

## 2018-07-09 RX ADMIN — ONDANSETRON 4 MG: 2 INJECTION INTRAMUSCULAR; INTRAVENOUS at 08:27

## 2018-07-09 RX ADMIN — FLUTICASONE PROPIONATE 1 SPRAY: 50 SPRAY, METERED NASAL at 08:28

## 2018-07-09 NOTE — PLAN OF CARE
Problem: Patient Care Overview  Goal: Plan of Care/Patient Progress Review  Outcome: No Change  Status: Pt admitted d/t headache with photophobia, nausea, and tingling sensation over cheeks.   VS: VSS. Denies any chest pain. BP stable.     Neuros: A&Ox4. Photophobia. Intermittently sees flashing lights.  Double/blurry vision, per pt getting better. BLE N/T  from fingers to wrist (baseline). LLE N/T from toes to knee (baseline). RLE N/T from toes to ankle (baseline). Tingling sensation over cheeks.   GI: Regular diet. Nausea partially controlled with scheduled reglan and PRN zofran. Voids spont.  : No BM this shift, but passing flatus.   ?  IV: NS @ 50mL/hr.   Activity: SBA  Pain: Headache partially controlled with PRN tramadol and DHE protocol. 0.75mg given for 0430 DHE protocol.   Respiratory/Trach: LS clear   Skin: Bruises from previous PIV.   Plan of care: Will continue to monitor and follow POC.

## 2018-07-09 NOTE — DISCHARGE SUMMARY
[facility], [place]    Neurology Discharge Summary    Date of Admission: 7/6/2018  Date of Discharge: July 9, 2018    Disposition: Discharged to home  Primary Care Physician: Ellyn Rivera     Admission Diagnosis:   Chronic Migraine  Status Migrainosus  Medication-overuse Headache    Discharge Diagnosis:   Same    Problem Leading to Hospitalization (from [facility]):   Ada is a 47 year old woman with a history of Tom-Danlos complicated by chronic pain and chronic migraine who presented in status migrainosus. Prior to admission, she had a severe migraine for 3 weeks. She had used all of her home medications without relief. The headache was bifrontal with photophobia, nausea, and positional in nature. Her medication regimen prior to admission included amitriptyline, acetaminophen, duloxetine, medical cannabis, metoclopramide, oxycodone, rizatriptan, tizanidine, zondisamide, sumatriptan, ondansetron, and tramadol. She was admitted here last year and put on the DHE protocol which was complicated by bradycardia. She has had extensive workup for secondary causes of headache in the past including MRI/MRV and LP with CSF studies.    In ED, patient received IVF boluses, reglan, zyprexa and toradol, which helped but did not completely abort the headache. A head CT without contrast was performed which found no acute intracranial pathology and maxillary/ethmoid sinus thickening with frothy debris suggestive of acute sinusitis.    Please see H&P dated for further details about presentation.    Brief Hospital Course:   She was admitted the night of 7/6 and started on the DHE protocol. She received 2mg per day on 7/7 and 7/8 with some improvement but no resolution. She was monitored on telemetry and did not have any athymias or bradycardia. Her home medications were restarted on 7/8 with the addition of as-needed olanzapine, which she found helpful, especially for sleep. She was able to maintain  adequate hydration and caloric intake by mouth for the duration of her stay. Her nausea was controlled, but had not remitted. Dr. Nguyen discussed a trial of Aimovig (CGRP receptor antagonist), including risks and benefits. She was agreeable to this and filled out paperwork for a 2 month trial. She has a follow up appointment with Dr. Nguyen on 7/17.    At the time of discharge, she still had a headache and was advised to stay in a cool, dark place, and maintain adequate hydration and food intake.    PERTINENT INVESTIGATIONS    Labs  CBC, BMP, and INR at time of admission were within normal limits other than a total calcium of 8 (ionized calcium levels were not obtained).    Head CT without contrast in the ED revealed no acute intracranial pathology.    PHYSICAL EXAMINATION  General: adult in NAD, cooperative.   HEENT: oropharynx clear without drainage, erythema, or exudate  CV: regular rate and rhytym  Lungs: no audible wheezing  Extremities: trace edema in hand and feet, no cyanosis, warm, well perfused  -----------  Neurological:  Mental Status: Awake and alert, cooperative and interacting appropriately. No difficulty following commands. Oriented. Speech fluent, clear and coherent. Naming, registration intact.   Cranial nerves: Visual fields intact. PERRL, EOMI, Facial sensation reduced in left V2. Facial movements symmetric. Uvula midline, palate elevation symmetric. Tongue protrusion midline with full lateral motion.   Motor: Tone normal. Muscle bulk symmetric and appropriate. No tremors or other involuntary movements observed. No pronator Drift. Headache exacerbated during strength testing of lower extremities.       Dorsal IO  Biceps Triceps Deltoid Hip flexor Knee extension Knee flexion Ankle extension Ankle flexion   Right 5 5 5 5 5 5 5 5 5 5   Left 4 5 5 5 5 5 5 5 5 5      Reflexes:      Brachioradialis Biceps Patellar Achilles   Right 2+ 2+ 2+ 1+   Left 2+ 2+ 2+ 1+      Reflexes: 2+ and symmetric  throughout upper and lower extremities. No clonus. Toes mute.  Sensory:  Intact to light touch throughout.  Coordination:  FNF and HS intact without dysmetria.   Station and Gait: Deferred     Additional recommendations and follow up:       Review of your medicines      START taking       Dose / Directions    OLANZapine 5 MG tablet   Commonly known as:  zyPREXA        Dose:  5 mg   Take 1 tablet (5 mg) by mouth daily as needed (headache pain, nausea)   Quantity:  10 tablet   Refills:  0         CONTINUE these medicines which have NOT CHANGED       Dose / Directions    ACETAMINOPHEN PO        Dose:  1000 mg   Take 1,000 mg by mouth every 6 hours as needed for pain Take with tramadol   Refills:  0       ADVAIR DISKUS 250-50 MCG/DOSE diskus inhaler   Generic drug:  fluticasone-salmeterol        Dose:  1 puff   Inhale 1 puff into the lungs 2 times daily as needed   Refills:  0       albuterol 108 (90 Base) MCG/ACT Inhaler   Commonly known as:  PROAIR HFA/PROVENTIL HFA/VENTOLIN HFA        Dose:  2 puff   Inhale 2 puffs into the lungs every 6 hours as needed   Refills:  0       AMITRIPTYLINE HCL PO        Take 40 mg by mouth daily for two weeks, then increase to 50 mg daily.   Refills:  0       buprenorphine 10 MCG/HR WK patch   Commonly known as:  BUTRANS   Used for:  Postlaminectomy syndrome of lumbar region        Dose:  1 patch   Place 1 patch onto the skin every 7 days   Quantity:  4 patch   Refills:  2       butalbital-acetaminophen-caffeine -40 MG per tablet   Commonly known as:  FIORICET/ESGIC        TAKE ONE TABLET BY MOUTH EVERY 4 HOURS AS NEEDED   Refills:  0       cetirizine 10 MG tablet   Commonly known as:  zyrTEC        Dose:  10 mg   Take 10 mg by mouth daily   Refills:  0       COMPRESSION STOCKINGS   Used for:  Orthostatic hypotension        Dose:  1 each   1 each daily 20-30 mmHg Thigh Length measure and fit, color and style per patient preference. Doff n jose per need.   Quantity:  3 each    Refills:  3       DULOXETINE HCL PO        Take 60 mg by mouth in the morning and 30 mg in the evening.   Refills:  0       eletriptan 20 MG tablet   Commonly known as:  RELPAX   Used for:  Chronic daily headache, Intractable migraine without aura and with status migrainosus        Dose:  20 mg   Take 1 tablet (20 mg) by mouth at onset of headache for migraine May repeat in 2 hours. Max 4 tablets/24 hours.   Quantity:  18 tablet   Refills:  1       EPINEPHrine 0.3 MG/0.3ML injection 2-pack   Commonly known as:  EPIPEN/ADRENACLICK/or ANY BX GENERIC EQUIV        Dose:  0.3 mg   Inject 0.3 mg into the muscle   Refills:  0       famotidine 20 MG tablet   Commonly known as:  PEPCID   Used for:  Hiatal hernia, Mast cell disorder        Dose:  20 mg   Take 1 tablet (20 mg) by mouth At Bedtime   Quantity:  90 tablet   Refills:  1       fludrocortisone 0.1 MG tablet   Commonly known as:  FLORINEF   Used for:  Autonomic dysfunction, Pre-syncope        Dose:  0.2 mg   Take 2 tablets (0.2 mg) by mouth daily   Quantity:  180 tablet   Refills:  3       fluticasone 50 MCG/ACT spray   Commonly known as:  FLONASE   Used for:  Chronic rhinitis, unspecified type        INSTILL 2 SPRAYS INTO BOTH NOSTRILS DAILY   Quantity:  16 mL   Refills:  3       linaclotide 145 MCG capsule   Commonly known as:  LINZESS   Used for:  Chronic idiopathic constipation        Dose:  145 mcg   Take 1 capsule (145 mcg) by mouth every morning (before breakfast) Last refill until GI clinic follow up.   Quantity:  30 capsule   Refills:  0       medical cannabis (Patient's own supply.  Not a prescription)        Dose:  1 Dose   Take 1 Dose by mouth See Admin Instructions (This is NOT a prescription, and does not certify that the patient has a qualifying medical condition for medical cannabis.  The purpose of this order is  to document that the patient reports taking medical cannabis.)   Refills:  0       METOCLOPRAMIDE HCL PO        Dose:  5-10 mg   Take  5-10 mg by mouth every 6 hours as needed   Refills:  0       MULTIVITAMIN PO        Dose:  1 tablet   Take 1 tablet by mouth daily   Refills:  0       omeprazole 20 MG CR capsule   Commonly known as:  priLOSEC   Used for:  Hiatal hernia        Dose:  20 mg   Take 1 capsule (20 mg) by mouth daily   Quantity:  90 capsule   Refills:  2       ondansetron 4 MG ODT tab   Commonly known as:  ZOFRAN-ODT   Used for:  Migraine without status migrainosus, not intractable, unspecified migraine type        Dose:  4-8 mg   Take 1-2 tablets (4-8 mg) by mouth every 12 hours as needed for nausea   Quantity:  60 tablet   Refills:  1       oxyCODONE IR 5 MG tablet   Commonly known as:  ROXICODONE   Used for:  Postlaminectomy syndrome        Dose:  2.5 mg   Take 0.5 tablets (2.5 mg) by mouth every 6 hours as needed for severe pain   Quantity:  15 tablet   Refills:  0       PROBIOTIC DAILY PO        Dose:  2 packet   Take 2 packets by mouth every morning   Refills:  0       Propranolol HCl SR Beads 80 MG Cp24   Used for:  Sinus tachycardia        Dose:  80 mg   Take 80 mg by mouth daily   Quantity:  90 capsule   Refills:  3       rizatriptan 5 MG ODT tab   Commonly known as:  MAXALT-MLT        TAKE 1-2 TABLETS (5-10 MG) BY MOUTH AT ONSET OF HEADACHE FOR MIGRAINE (MAX 30 MG IN 24 HOURS)   Refills:  6       SUMAtriptan 6 MG/0.5ML pen injector kit   Commonly known as:  IMITREX STATDOSE   Used for:  Chronic migraine without aura without status migrainosus, not intractable        Dose:  6 mg   Inject 0.5 mLs (6 mg) Subcutaneous at onset of headache for migraine (may repeat once after 2 hrs) May repeat in 1 hour. Max 12 mg/24 hours.   Quantity:  2 kit   Refills:  1       SYNTHROID 25 MCG tablet   Generic drug:  levothyroxine        Dose:  25 mcg   Take 25 mcg by mouth daily   Refills:  0       tiZANidine 4 MG tablet   Commonly known as:  ZANAFLEX        Dose:  4 mg   Take 1 tablet (4 mg) by mouth 4 times daily as needed for muscle spasms    Quantity:  120 tablet   Refills:  3       traMADol 50 MG tablet   Commonly known as:  ULTRAM   Used for:  Postlaminectomy syndrome        Dose:  50 mg   Take 1 tablet (50 mg) by mouth every 6 hours as needed for severe pain   Quantity:  15 tablet   Refills:  0       VITAMIN D (CHOLECALCIFEROL) PO        Dose:  2000 Units   Take 2,000 Units by mouth daily   Refills:  0       ZONISAMIDE PO        Dose:  75 mg   Take 75 mg by mouth daily   Refills:  0            Where to get your medicines      Some of these will need a paper prescription and others can be bought over the counter. Ask your nurse if you have questions.     Bring a paper prescription for each of these medications      OLANZapine 5 MG tablet             Reason for your hospital stay   You were admitted to the hospital for your migraine, nausea and vomiting.     Adult [facility] Follow-up and recommended labs and tests   Follow up with primary care provider, Ellyn Rivera, within 14 days for hospital follow- up.  No follow up labs or test are needed.      Follow up with your scheduled appointments with Neurology and with the Pain Clinic.     Appointments on [place] and/or [facility] (with [facility] or [facility] provider or service). Call [phone] if you haven't heard regarding these appointments within 7 days of discharge.     Activity   Your activity upon discharge: activity as tolerated     Discharge Instructions   - Take Reglan with Bendaryl for nausea.  - Avoid taking Reglan and Zyprexa together.     Full Code     Diet   Follow this diet upon discharge: Orders Placed This Encounter     Regular Diet Adult       Patient was seen and discussed with Dr. Nguyen, who agrees with the assessment and plan.     Note created in collaboration withJojo Gonzalez, MS4 . I have personally reviewed the note and made any necessary changes; I have made necessary changes to the physical exam and assessment/plan.    ANDREW Riggins [facility] BELLE, MSc(Res)  Neurology  Resident, PGY-2  Pager: [phone]

## 2018-07-09 NOTE — PLAN OF CARE
Problem: Nausea/Vomiting (Adult)  Goal: Identify Related Risk Factors and Signs and Symptoms  Related risk factors and signs and symptoms are identified upon initiation of [employer] Clinical Practice Guideline (CPG).   Outcome: Adequate for Discharge Date Met: 07/09/18 07/09/18 0800   Nausea/Vomiting   Related Risk Factors (Nausea/Vomiting) gastrointestinal dysfunction;female gender   Signs and Symptoms (Nausea/Vomiting) report of queasy sensation       Problem: Patient Care Overview  Goal: Plan of Care/Patient Progress Review  Outcome: Adequate for Discharge Date Met: 07/09/18  DISCHARGE                           ----------------------------------------------------------------------------  Discharged to: Home  Via: private transportation  Accompanied by: Family  Discharge Instructions: Reg diet, activity as tolerated, medications, follow up appointments, when to call the MD, aftercare instructions all discussed with pt.  Prescriptions: To be filled by [facility] per pt request; medication list and script reviewed & sent with pt  Follow Up Appointments: arranged; information given  Belongings: All sent with pt  IV: d/c'd  Telemetry: d/c'd  Pt exhibits understanding of above discharge instructions; all questions answered. Pt will have  transport her down to front lobby in wheelchair.     Discharge Paperwork: Signed, copied, and sent home with patient.

## 2018-07-10 ENCOUNTER — TELEPHONE (OUTPATIENT)
Dept: ANESTHESIOLOGY | Facility: CLINIC | Age: 48
End: 2018-07-10

## 2018-07-10 ENCOUNTER — CARE COORDINATION (OUTPATIENT)
Dept: CARE COORDINATION | Facility: CLINIC | Age: 48
End: 2018-07-10

## 2018-07-10 DIAGNOSIS — M96.1 POSTLAMINECTOMY SYNDROME: ICD-10-CM

## 2018-07-10 RX ORDER — CEFUROXIME AXETIL 250 MG/1
6 TABLET ORAL
Qty: 2 KIT | Refills: 1 | Status: SHIPPED | OUTPATIENT
Start: 2018-07-10 | End: 2018-08-15

## 2018-07-10 ASSESSMENT — ENCOUNTER SYMPTOMS
WHEEZING: 1
CHILLS: 1
NUMBNESS: 1
STIFFNESS: 1
VOMITING: 1
BLOATING: 1
SORE THROAT: 1
JOINT SWELLING: 1
SPEECH CHANGE: 1
DECREASED APPETITE: 1
SPUTUM PRODUCTION: 1
DIZZINESS: 1
BLOOD IN STOOL: 0
EYE PAIN: 1
HOARSE VOICE: 1
POLYPHAGIA: 0
NAIL CHANGES: 1
HALLUCINATIONS: 0
LIGHT-HEADEDNESS: 1
FEVER: 1
RECTAL PAIN: 0
DECREASED LIBIDO: 0
EXERCISE INTOLERANCE: 1
BACK PAIN: 1
ALTERED TEMPERATURE REGULATION: 1
HEMATURIA: 0
WEAKNESS: 1
DYSPNEA ON EXERTION: 1
FLANK PAIN: 1
HYPOTENSION: 1
TASTE DISTURBANCE: 1
SINUS CONGESTION: 1
WEIGHT GAIN: 1
MYALGIAS: 1
SWOLLEN GLANDS: 1
NAUSEA: 1
PARALYSIS: 0
BOWEL INCONTINENCE: 0
SHORTNESS OF BREATH: 1
FATIGUE: 1
MUSCLE CRAMPS: 1
PALPITATIONS: 1
SLEEP DISTURBANCES DUE TO BREATHING: 1
SINUS PAIN: 1
JAUNDICE: 0
TROUBLE SWALLOWING: 1
HEMOPTYSIS: 1
SMELL DISTURBANCE: 1
SNORES LOUDLY: 1
HOT FLASHES: 0
DISTURBANCES IN COORDINATION: 1
HEADACHES: 1
DOUBLE VISION: 1
DIFFICULTY URINATING: 1
WEIGHT LOSS: 0
ORTHOPNEA: 1
LEG PAIN: 1
TREMORS: 1
MEMORY LOSS: 1
COUGH DISTURBING SLEEP: 1
SKIN CHANGES: 1
COUGH: 1
EYE IRRITATION: 1
POSTURAL DYSPNEA: 1
HYPERTENSION: 1
SYNCOPE: 1
SEIZURES: 0
MUSCLE WEAKNESS: 1
LOSS OF CONSCIOUSNESS: 1
NECK PAIN: 1
HEARTBURN: 1
ARTHRALGIAS: 1
POLYDIPSIA: 1
TINGLING: 1
INCREASED ENERGY: 1
CONSTIPATION: 1
DYSURIA: 1
NIGHT SWEATS: 1
ABDOMINAL PAIN: 1
BRUISES/BLEEDS EASILY: 1
NECK MASS: 1
EYE WATERING: 1
POOR WOUND HEALING: 1
DIARRHEA: 1
EYE REDNESS: 1

## 2018-07-10 NOTE — TELEPHONE ENCOUNTER
SUMAtriptan (IMITREX STATDOSE) 6 MG/0.5ML pen injector kit  Last Written Prescription Date:  7/31/17  Last Fill Quantity: 2 kit,   # refills: 1  Last Office Visit : 4/19/18  Future Office visit: 10/25/18

## 2018-07-10 NOTE — TELEPHONE ENCOUNTER
Pt reported being hospitalized over the weekend for intractable migraine.  Pt reported that upon discharge, she was instructed to take tramadol 50mg q6h for the next week to keep pain under control.  Pt needs tramadol refill.      Dr. Martinez updated.  Verbal orders given by MD for tramadol 50mg q6h for one week, as instructed at hospital discharge.  Pt can have 60 tablets every two weeks.       Pt updated with Dr. Martinez's recommendations.      Jessica De Souza, RN, BSN

## 2018-07-10 NOTE — TELEPHONE ENCOUNTER
[facility] Call Center    Phone Message    May a detailed message be left on voicemail: yes    Reason for Call: Other: Pt was just released from hospital. Would like to talk to her care team about the medication regimen they put her on post discharge. Please contact pt.     Action Taken: Message routed to:  [facility] ([facility]): Pain

## 2018-07-12 ENCOUNTER — MYC MEDICAL ADVICE (OUTPATIENT)
Dept: CARDIOLOGY | Facility: CLINIC | Age: 48
End: 2018-07-12

## 2018-07-12 RX ORDER — TRAMADOL HYDROCHLORIDE 50 MG/1
50 TABLET ORAL EVERY 6 HOURS PRN
Qty: 60 TABLET | Refills: 0 | Status: SHIPPED | OUTPATIENT
Start: 2018-07-12 | End: 2018-08-16

## 2018-07-13 ENCOUNTER — CARE COORDINATION (OUTPATIENT)
Dept: NEUROLOGY | Facility: CLINIC | Age: 48
End: 2018-07-13

## 2018-07-13 NOTE — PROGRESS NOTES
Sent aimovig to the fax number to receive the trial today at 0927 on 7/13/2018, and sent it to [facility].

## 2018-07-13 NOTE — TELEPHONE ENCOUNTER
FUTURE VISIT INFORMATION      FUTURE VISIT INFORMATION:    Date: 7/16/18    Time: 9:20AM    Location: [facility]  REFERRAL INFORMATION:    Referring provider:  Self referred     Referring providers clinic:  self    Reason for visit/diagnosis  lymph node    RECORDS REQUESTED FROM:       Clinic name Comments Records Status Imaging Status   [facility] 6/2/18 MRI Cranio-Cervical & Brain   5/18/18 US Head Neck  EPIC PACS   [facility] 3/14/17 US NEck Thyroid HEad  1/6/17 CT Neck   **4/4/17 US Needle Bx (PENDING) Care Everywhere PACS   [facility]  3/27/17, 2/6/17, 12/23/16, etc..  notes with Dr Rich Bain Care Everywhere                        RECORDS STATUS      7/13/18 11:47AM sent a fax to [facility] for 4/4/17 Pathology from US Needle Bx via [employer]. [facility] called and stated path is at [facility], 2nd request sent  - Amay     7/17/18 2:36PM received path slides from [facility] and sent to surgical path - Amay

## 2018-07-16 ENCOUNTER — OFFICE VISIT (OUTPATIENT)
Dept: OTOLARYNGOLOGY | Facility: CLINIC | Age: 48
End: 2018-07-16
Payer: MEDICARE

## 2018-07-16 ENCOUNTER — PRE VISIT (OUTPATIENT)
Dept: OTOLARYNGOLOGY | Facility: CLINIC | Age: 48
End: 2018-07-16

## 2018-07-16 VITALS — BODY MASS INDEX: 32.95 KG/M2 | WEIGHT: 205 LBS | HEIGHT: 66 IN

## 2018-07-16 DIAGNOSIS — R59.1 LYMPHADENOPATHY: ICD-10-CM

## 2018-07-16 DIAGNOSIS — Q79.60 EHLERS-DANLOS SYNDROME: Primary | ICD-10-CM

## 2018-07-16 RX ORDER — PROPRANOLOL HYDROCHLORIDE 80 MG/1
CAPSULE, EXTENDED RELEASE ORAL
Refills: 2 | Status: ON HOLD | COMMUNITY
Start: 2018-06-07 | End: 2018-07-25

## 2018-07-16 ASSESSMENT — PAIN SCALES - GENERAL: PAINLEVEL: NO PAIN (0)

## 2018-07-16 NOTE — NURSING NOTE
Chief Complaint   Patient presents with     Consult     non-cancerous lymph node.     Tien Guy, EMT

## 2018-07-16 NOTE — LETTER
7/16/2018       RE: Ada Welch  [address] [place] [address]     Dear Colleague,    Thank you for referring your patient, Ada Welch, to the [facility] EAR NOSE AND THROAT at [facility]. Please see a copy of my visit note below.    I had the pleasure of meeting Ada Welch in consultation today at the [facility].     History of Present Illness:   Patient is a 47-year-old woman who is referred for evaluation of lymphadenopathy.  She says that she did not ever noticed this personally but it was found locally on imaging. 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  This was about 2 years ago at [facility].  She has been followed by an oncologist locally and had CT scans every 6 months as well as ultrasounds every couple of months to monitor the nodes.  She says that they notice changes in them but she has not had any issues.  She has thought that she has had some ear pain related to an ear infection.  She did have a biopsy on 1 of the nodes last spring at [facility] and her understanding was this was benign.  She has now transferred all of her care to the [facility] as she moved to [place].  Separately she has issues with choking on food regularly particularly meats.  She says she had a swallow study in spring which we do not have.  She says she is followed by GI for these issues.  She has gained about 15 pounds in the last 1.5 weeks secondary to steroid use for dysautonomia.      Social history: Quit smoking about 3 years ago and previously a 3-4 cigarette per day smoker for 15 years.  No chewing tobacco use.  No regular alcohol use.    Past surgical history: Tonsillectomy, septoplasty ×2, tubal ligation, lumbar fusion and revision     Past medical history: Largely notable for Tom-Danlos, otherwise see below    Family history: Negative for  lymphoma    MEDICATIONS:     Current Outpatient Prescriptions   Medication Sig Dispense Refill     ACETAMINOPHEN PO Take 1,000 mg by mouth every 6 hours as needed for pain Take with tramadol       albuterol (PROAIR HFA/PROVENTIL HFA/VENTOLIN HFA) 108 (90 BASE) MCG/ACT Inhaler Inhale 2 puffs into the lungs every 6 hours as needed        AMITRIPTYLINE HCL PO Take 40 mg by mouth daily for two weeks, then increase to 50 mg daily.       buprenorphine (BUTRANS) 10 MCG/HR WK patch Place 1 patch onto the skin every 7 days 4 patch 2     butalbital-acetaminophen-caffeine (FIORICET/ESGIC) -40 MG per tablet TAKE ONE TABLET BY MOUTH EVERY 4 HOURS AS NEEDED  0     cetirizine (ZYRTEC) 10 MG tablet Take 10 mg by mouth daily        COMPRESSION STOCKINGS 1 each daily 20-30 mmHg Thigh Length measure and fit, color and style per patient preference. Doff n jose per need. 3 each 3     DULOXETINE HCL PO Take 60 mg by mouth in the morning and 30 mg in the evening.       eletriptan (RELPAX) 20 MG tablet Take 1 tablet (20 mg) by mouth at onset of headache for migraine May repeat in 2 hours. Max 4 tablets/24 hours. 18 tablet 1     EPINEPHrine (EPIPEN/ADRENACLICK/OR ANY BX GENERIC EQUIV) 0.3 MG/0.3ML injection 2-pack Inject 0.3 mg into the muscle       famotidine (PEPCID) 20 MG tablet Take 1 tablet (20 mg) by mouth At Bedtime 90 tablet 1     fludrocortisone (FLORINEF) 0.1 MG tablet Take 2 tablets (0.2 mg) by mouth daily 180 tablet 3     fluticasone (FLONASE) 50 MCG/ACT spray INSTILL 2 SPRAYS INTO BOTH NOSTRILS DAILY 16 mL 3     fluticasone-salmeterol (ADVAIR DISKUS) 250-50 MCG/DOSE diskus inhaler Inhale 1 puff into the lungs 2 times daily as needed        levothyroxine (SYNTHROID) 25 MCG tablet Take 25 mcg by mouth daily       linaclotide (LINZESS) 145 MCG capsule Take 1 capsule (145 mcg) by mouth every morning (before breakfast) Last refill until GI clinic follow up. 30 capsule 0     medical cannabis (Patient's own supply.  Not a  prescription) Take 1 Dose by mouth See Admin Instructions (This is NOT a prescription, and does not certify that the patient has a qualifying medical condition for medical cannabis.  The purpose of this order is  to document that the patient reports taking medical cannabis.)       METOCLOPRAMIDE HCL PO Take 5-10 mg by mouth every 6 hours as needed       Multiple Vitamins-Minerals (MULTIVITAMIN PO) Take 1 tablet by mouth daily        OLANZapine (ZYPREXA) 5 MG tablet Take 1 tablet (5 mg) by mouth daily as needed (headache pain, nausea) 10 tablet 0     omeprazole (PRILOSEC) 20 MG CR capsule Take 1 capsule (20 mg) by mouth daily 90 capsule 2     ondansetron (ZOFRAN-ODT) 4 MG ODT tab Take 1-2 tablets (4-8 mg) by mouth every 12 hours as needed for nausea 60 tablet 1     oxyCODONE IR (ROXICODONE) 5 MG tablet Take 0.5 tablets (2.5 mg) by mouth every 6 hours as needed for severe pain 15 tablet 0     Probiotic Product (PROBIOTIC DAILY PO) Take 2 packets by mouth every morning        propranolol (INDERAL LA) 80 MG 24 hr capsule   2     Propranolol HCl SR Beads 80 MG CP24 Take 80 mg by mouth daily 90 capsule 3     rizatriptan (MAXALT-MLT) 5 MG ODT tab TAKE 1-2 TABLETS (5-10 MG) BY MOUTH AT ONSET OF HEADACHE FOR MIGRAINE (MAX 30 MG IN 24 HOURS)  6     SUMAtriptan (IMITREX STATDOSE) 6 MG/0.5ML pen injector kit Inject 0.5 mLs (6 mg) Subcutaneous at onset of headache for migraine (may repeat once after 2 hrs) May repeat in 1 hour. Max 12 mg/24 hours. 2 kit 1     tiZANidine (ZANAFLEX) 4 MG tablet Take 1 tablet (4 mg) by mouth 4 times daily as needed for muscle spasms 120 tablet 3     traMADol (ULTRAM) 50 MG tablet Take 1 tablet (50 mg) by mouth every 6 hours as needed for severe pain (Take scheduled every 6 hours for one week, then resume taking as needed) 60 tablet 0     VITAMIN D, CHOLECALCIFEROL, PO Take 2,000 Units by mouth daily       ZONISAMIDE PO Take 75 mg by mouth daily         ALLERGIES:    Allergies   Allergen Reactions      Bee Venom Anaphylaxis, Difficulty breathing, Palpitations, Shortness Of Breath and Visual Disturbance     Patient does carry Epi-Pen     No Clinical Screening - See Comments Anaphylaxis     Chicken-Derived Products (Egg) Diarrhea and Nausea     Patient will self monitor  Other reaction(s): Nausea  Patient will self monitor  Other reaction(s): GI intolerance     Compazine [Prochlorperazine] Other (See Comments)     Extreme jittery     Gabapentin      Gluten Meal Other (See Comments)     Topiramate      Wheat Diarrhea     Wheat Bran Nausea     Patient will self monitor  Other reaction(s): Nausea  Patient will self monitor       HABITS/SOCIAL HISTORY:   Quit smoking about 3 years ago and previously a 3-4 cigarette per day smoker for 15 years.  No chewing tobacco use.  No regular alcohol use.    Social History     Social History     Marital status:      Spouse name: Carry     Number of children: 5     Years of education: N/A     Occupational History     Not on file.     Social History Main Topics     Smoking status: Former Smoker     Packs/day: 0.20     Years: 10.00     Types: Cigarettes     Start date: 1/1/1991     Quit date: 12/1/2013     Smokeless tobacco: Never Used      Comment: I somked on and off during specified dates.     Alcohol use Yes      Comment: Less than 5 drinks per year     Drug use: No     Sexual activity: Yes     Partners: Male     Birth control/ protection: Female Surgical     Other Topics Concern     Parent/Sibling W/ Cabg, Mi Or Angioplasty Before 65f 55m? 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     Chronic sinusitis 00/2007    Diagnosed with chronic pansinusitis 2016     Chronic tonsillitis 1980    Had tonsils removed 02/1981, rheumatic fever     Tom-Danlos disease      Gastroesophageal reflux disease 3/1/2017    I have large hiatal hernia     Hernia, abdominal 10/2016    Hiatal Hernia     Hiatal hernia 2016    Have adeline hiatel hernia     Hoarseness Unsure    It comes and goes     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     Migraines 1/1/2007    Unsure of exact date     Nasal polyps 2013    Were revoved 03/2017, benign     Other nervous system complications 1/2014    Autonomic nervous system disorder, neuralgia, neuropathy     Pneumonia Unsure    Have had pneumonia several times     Swelling, mass, or lump in head and neck 08/2016    Lymph node has been growing for last year     Thyroid disease 01/01/2008        PAST SURGICAL HISTORY:   Past Surgical History:   Procedure Laterality Date     ADENOIDECTOMY  1981     AS REPAIR OF NASAL SEPTUM       BACK SURGERY  12/09/2013  10/01/2014    Spinal fusion L4-S1, L5 moving 5/8ths in. Remove screws/rods     BIOPSY  01/01/2008 & 3/2017    mole biopsy, lymph node biopsy     COLONOSCOPY  3/2017    Colonoscopy and GI     NASAL/SINUS POLYPECTOMY  2017    Polyps were benign     TONSILLECTOMY       TONSILLECTOMY  1981     TUBAL LIGATION         FAMILY HISTORY:    Family History   Problem Relation Age of Onset     Connective Tissue Disorder Mother      eds     Connective Tissue Disorder Sister      eds     Cancer Father      Spinal tumor grew into spinal cord, went in brain     Migraines Father      Diabetes Maternal Grandmother      Elderly diabetes     HEART DISEASE Maternal Grandmother      Hypertension Maternal Grandmother      Diabetes Paternal Grandmother      Elderly diabetes     Diabetes Paternal Grandfather      Elderly diabetes     Asthma Sister      Pediatric Asthma     Migraines Sister      Asthma  "Son      Pediatric Asthma     Thyroid Disease Sister      ,     Migraines Sister      Migraines Sister      Breast Cancer No family hx of        REVIEW OF SYSTEMS:  12 point ROS was negative other than the symptoms noted above in the HPI.  Patient Supplied Answers to Review of Systems  [facility] ENT ROS 7/10/2018   Constitutional Weight gain, Appetite change, Unexplained fatigue, Problems with sleep, Unexplained fever or night sweats   Neurology Dizzy spells, Numbness, Unexplained weakness, Headache   Eyes Double vision   Ears, Nose, Throat Hearing loss, Ear pain, Ringing/noise in ears, Nasal congestion or drainage, Sore throat, Trouble swallowing, Hoarseness, Coughing up blood   Cardiopulmonary Breathing problems, Chest pain   Gastrointestinal/Genitourinary Heartburn/indigestion, Unexplained nausea/vomiting, Pain with urination, Constipation, Diarrhea   Musculoskeletal Sore or stiff joints, Swollen joints, Back pain, Neck pain, Swollen legs/feet   Allergy/Immunology Allergies or hay fever, Skin changes, Rash   Hematologic Bleeding problems, Easy bruising, Lymph node swelling   Endocrine Thirst, Heat or cold intolerance, Frequent urination   Skin Change in moles, Skin lesions   Other Rash, Problems with anesthesia in surgery         PHYSICAL EXAMINATION:   Ht 1.676 m (5' 6\")  Wt 93 kg (205 lb)  BMI 33.09 kg/m2   Appearance:   normal; NAD, age-appropriate appearance, well-developed, obese   Communication:   normal; communicates verbally, normal voice quality   Head/Face:   inspection -  Normal; no scars or visible lesions   Salivary glands -  Normal size, no tenderness, swelling, or palpable masses   Facial strength -  Normal and symmetric bilateral; H/B I/VI   Skin:  normal, no rash   Ocular Motility:  normal occular movements   Ears:  auricle (AD) -  normal  EAC (AD) -  normal  TM (AD) -  Normal, no effusion  auricle (AS) -  normal  EAC (AS) -  normal  TM (AS) -  Normal, no effusion  Normal clinical speech reception "   Nose:  Ext. inspection -  Normal   Oral Cavity:  lips -  Normal mucosa, oral competence, and stoma size   Age-appropriate dentition, healthy gingival mucosa   Hard palate, buccal, floor of mouth mucosa normal   Tongue - normal movement, no lesions   Oropharynx:  mucosa -  Normal, no lesions  soft palate -  Normal, no lesions, no asymmetry, normal elevation  tonsils -  absent   Neck: No visible mass or asymmetry   Normal palpation, no tenderness, no tracheal deviation  Normal range of motion   Lymphatic:  no abnormal nodes   Cardiovascular:  warm, pink, well-perfused extremities without swelling, tenderness, or edema   Respiratory:  Normal respiratory effort, no stridor   Neuro/Psych.:  mood/affect -  normal  mental status -  normal  cranial nerves -  normal            RESULTS REVIEWED:   I reviewed the ultrasound report as well as a images from a recent CT scan in 2016 from [facility].  This shows small lymph nodes, with normal morphology on U/S    U/S:  FINDINGS:     Lymph nodes are measured bilaterally with measurements given in  craniocaudal, transverse and AP dimensions as follows:     Right:  Level 1: None.  Level 2: 1.7 x 0.6 x 1.9 cm with a preserved fatty hilum.  Level 3: 0.7 x 0.2 x 1.0 cm with replaced fatty hilum but no increased  vascularization.  Level 4: 0.4 x 0.3 x 0.5 cm with replaced fatty hilum but no increased  vascularization.  Level 5: None.  Level 6: None.  Level 7: None.     Left:  Level 1: None.  Level 2: There are 2 lymph nodes measuring 1.8 x 0.7 x 2.3 cm with a  preserved fatty hilum and measuring 1.0 x 0.6 x 1.4 cm with a  preserved fatty hilum.  Level 3: None.  Level 4: None.  Level 5: None.  Level 6: None.  Level 7: None.     Partially visualized thyroid gland with no significant sonographic  findings.         IMPRESSION:  1.  Soft tissue neck ultrasound with lymph node measurements as  described above. Mildly prominent right and left level 2 lymph nodes  were thought to reflect  reactive changes.      IMPRESSION AND PLAN:   Patient is a 47-year-old woman with a history of Tom-Danlos syndrome.  She comes in for evaluation of lymphadenopathy.  Her recent ultrasound here shows some reactive lymph nodes.  Her CT scan from September 2016 show some small lymph nodes that are not overly concerning.  She reportedly had a biopsy locally.  I had like to obtain the outside path slides and have them reviewed here. Based on these we could always consider doing a repeat FNA under U/S guidance. I do not think I would plan on regular CT scans of her neck for evaluation of this.  She can f/u PRN.    Thank you very much for the opportunity to participate in the care of your patient.      Karla Killian M.D.  Otolaryngology- Head & Neck Surgery        CC:  Ellyn Rivera MD  Department of Internal Medicine  [facility]

## 2018-07-16 NOTE — MR AVS SNAPSHOT
After Visit Summary   7/16/2018    Ada Welch    MRN: [id number]           Patient Information     Date Of Birth          1970        Visit Information        Provider Department      7/16/2018 9:20 AM Karla Killian MD [facility]        Care Instructions    1. We will have your pathology slides reviewed with our pathologist and call you with any further steps.   2. Please call the ENT clinic with any questions,concerns, new or worsening symptoms.    -Clinic number is [phone]   - Daisy's direct line (Dr. Killian's nurse) [phone]              Follow-ups after your visit        Your next 10 appointments already scheduled     Jul 17, 2018 10:30 AM CDT   (Arrive by 10:15 AM)   Return Visit with Noemy Nguyen MD   [facility] ([facility] [place])    [address]  3rd Floor  [facility] [address]   [phone]            Jul 17, 2018 12:30 PM CDT   RETURN CLOTTING DISORDER with Cynthia Leach MD   [facility] ([facility])    [facility]  [address]  [facility] [address]   [phone]            Jul 23, 2018 11:25 AM CDT   (Arrive by 11:10 AM)   Return Visit with Shilpa Galicia MD   [facility] ([facility] [place] [facility])    [address]  4th Floor  [facility] [address]   [phone]            Jul 23, 2018 12:15 PM CDT   (Arrive by 12:00 PM)   New Allergy with Vahe Rodriguez MD   [facility] ([facility] [place])    [address] [place] [address]  [address]  [facility] [id number] [phone]           Do not take anti-histamines or Zantac for seven day prior to your appointment.            Aug 08, 2018  2:15 PM CDT   (Arrive by 2:00 PM)   Return Visit with Wade Singh MD   [facility]  ([facility])    [address] [address]   [phone]            Aug 08, 2018  4:20 PM CDT   (Arrive by 4:05 PM)   Return Visit with Juana Griffith MD   [facility] ([facility])    [address] [place] [address]   [phone]            Aug 13, 2018  1:40 PM CDT   (Arrive by 1:25 PM)   NEW THROAT with Seema Eubanks MD   [facility] ([facility])    [address] [place] [address]   [phone]           This is a multi-disciplinary care team visit as patients with your type of problem are usually seen by a team of an MD and a Speech-Language Pathologist (who is a specialist in disorders of the voice, throat, and breathing).  Please plan about 2 hours for your visit, which will likely include Laryngeal Function Studies, a Voice/Swallow/Breathing Evaluation, and an Endoscopic Laryngeal Examination to provide a comprehensive evaluation.  Please check with your insurance company to ensure you are covered for these services. - It is important to know that if you are evaluated and/or treated by both a physician and a speech pathologist during your visit, your billing will reflect the input that you receive from both providers as separate professionals. Although most insurance plans do cover these services, we encourage you to contact your insurance company prior to your visit to determine whether there are any coverage limitations that might affect you financially. - Billing/procedure codes that are frequently associated with visits to our clinic include (but are not limited to) the ones listed below. Most patients will not need all of these items, but it may be useful to ask your insurance company's patient . 21556: Flexible fiberoptic laryngoscopy, 20524: Laryngoscopy;  flexible or rigid fiberoptic, with stroboscopy, 42342: Flexible endoscopic evaluation of swallowing, 34414: Laryngeal function aerodynamic evaluation, 95857: Evaluation of Voice and Resonance, 91742: Speech pathology treatment for voice, speech, communication, 53646: Speech pathology treatment for swallowing/oral function for feeding - If you have any concerns or questions, or if you would prefer not to have a speech pathologist involved in your visit, please contact our Clinic Coordinator at [phone], at least 24 hours prior to your appointment.            Aug 13, 2018  1:40 PM CDT   (Arrive by 1:25 PM)   [facility] Speech Pathology and ENT Evaluation with  Ent Dysphonia Slp Provider   [facility] ([facility])    [address] [place] [address] [phone]            Aug 15, 2018  2:45 PM CDT   (Arrive by 2:30 PM)   Return Visit with Zoe Barahona MD   [facility] ([facility])    [address] [place] [address] [phone]            Aug 22, 2018  3:40 PM CDT   (Arrive by 3:25 PM)   Return Visit with Juana Griffith MD   [facility] ([facility])    [address] [place] [address] [phone]              Who to contact     Please call your clinic at [phone] to:    Ask questions about your health    Make or cancel appointments    Discuss your medicines    Learn about your test results    Speak to your doctor            Additional Information About Your Visit        MyChart Information     [employer] gives you secure access to your electronic health record. If you see a primary care provider, you can also send messages to your care team and make appointments. If you have questions, please call your primary care clinic.  If you do not have a primary care provider, please call [phone] and they will  "assist you.      [employer] is an electronic gateway that provides easy, online access to your medical records. With [employer], you can request a clinic appointment, read your test results, renew a prescription or communicate with your care team.     To access your existing account, please contact your [facility] or call [phone] for assistance.        Care EveryWhere ID     This is your Care EveryWhere ID. This could be used by other organizations to access your [place] medical records  [id number]        Your Vitals Were     Height BMI (Body Mass Index)                1.676 m (5' 6\") 33.09 kg/m2           Blood Pressure from Last 3 Encounters:   07/09/18 132/88   06/29/18 115/76   06/29/18 117/77    Weight from Last 3 Encounters:   07/16/18 93 kg (205 lb)   07/07/18 94.9 kg (209 lb 3.5 oz)   06/29/18 91.2 kg (201 lb 1 oz)              Today, you had the following     No orders found for display       Primary Care Provider Office Phone # Fax #    Ellyn Rivera [phone]        [address] [address]        Equal Access to Services     CHRISTINA GUY : Hadii kia romoo Sonegrito, waaxda luqadaha, qaybta kaalmada adeegyada, odette tang. So [facility] [phone].    ATENCIÓN: Si habla español, tiene a sweeney disposición servicios gratuitos de asistencia lingüística. Llame al [phone].    We comply with applicable federal civil rights laws and [place] laws. We do not discriminate on the basis of race, color, national origin, age, disability, sex, sexual orientation, or gender identity.            Thank you!     Thank you for choosing [facility]  for your care. Our goal is always to provide you with excellent care. Hearing back from our patients is one way we can continue to improve our services. Please take a few minutes to complete the written survey that you may receive in the mail after your " visit with us. Thank you!             Your Updated Medication List - Protect others around you: Learn how to safely use, store and throw away your medicines at www.disposemymeds.org.          This list is accurate as of 7/16/18  9:53 AM.  Always use your most recent med list.                   Brand Name Dispense Instructions for use Diagnosis    ACETAMINOPHEN PO      Take 1,000 mg by mouth every 6 hours as needed for pain Take with tramadol        ADVAIR DISKUS 250-50 MCG/DOSE diskus inhaler   Generic drug:  fluticasone-salmeterol      Inhale 1 puff into the lungs 2 times daily as needed        albuterol 108 (90 Base) MCG/ACT Inhaler    PROAIR HFA/PROVENTIL HFA/VENTOLIN HFA     Inhale 2 puffs into the lungs every 6 hours as needed        AMITRIPTYLINE HCL PO      Take 40 mg by mouth daily for two weeks, then increase to 50 mg daily.        buprenorphine 10 MCG/HR WK patch    BUTRANS    4 patch    Place 1 patch onto the skin every 7 days    Postlaminectomy syndrome of lumbar region       butalbital-acetaminophen-caffeine -40 MG per tablet    FIORICET/ESGIC     TAKE ONE TABLET BY MOUTH EVERY 4 HOURS AS NEEDED        cetirizine 10 MG tablet    zyrTEC     Take 10 mg by mouth daily        COMPRESSION STOCKINGS     3 each    1 each daily 20-30 mmHg Thigh Length measure and fit, color and style per patient preference. Doff n jose per need.    Orthostatic hypotension       DULOXETINE HCL PO      Take 60 mg by mouth in the morning and 30 mg in the evening.        eletriptan 20 MG tablet    RELPAX    18 tablet    Take 1 tablet (20 mg) by mouth at onset of headache for migraine May repeat in 2 hours. Max 4 tablets/24 hours.    Chronic daily headache, Intractable migraine without aura and with status migrainosus       EPINEPHrine 0.3 MG/0.3ML injection 2-pack    EPIPEN/ADRENACLICK/or ANY BX GENERIC EQUIV     Inject 0.3 mg into the muscle        famotidine 20 MG tablet    PEPCID    90 tablet    Take 1 tablet (20 mg) by  mouth At Bedtime    Hiatal hernia, Mast cell disorder       fludrocortisone 0.1 MG tablet    FLORINEF    180 tablet    Take 2 tablets (0.2 mg) by mouth daily    Autonomic dysfunction, Pre-syncope       fluticasone 50 MCG/ACT spray    FLONASE    16 mL    INSTILL 2 SPRAYS INTO BOTH NOSTRILS DAILY    Chronic rhinitis, unspecified type       linaclotide 145 MCG capsule    LINZESS    30 capsule    Take 1 capsule (145 mcg) by mouth every morning (before breakfast) Last refill until GI clinic follow up.    Chronic idiopathic constipation       medical cannabis (Patient's own supply.  Not a prescription)      Take 1 Dose by mouth See Admin Instructions (This is NOT a prescription, and does not certify that the patient has a qualifying medical condition for medical cannabis.  The purpose of this order is  to document that the patient reports taking medical cannabis.)        METOCLOPRAMIDE HCL PO      Take 5-10 mg by mouth every 6 hours as needed        MULTIVITAMIN PO      Take 1 tablet by mouth daily        OLANZapine 5 MG tablet    zyPREXA    10 tablet    Take 1 tablet (5 mg) by mouth daily as needed (headache pain, nausea)    Other migraine with status migrainosus, intractable       omeprazole 20 MG CR capsule    priLOSEC    90 capsule    Take 1 capsule (20 mg) by mouth daily    Hiatal hernia       ondansetron 4 MG ODT tab    ZOFRAN-ODT    60 tablet    Take 1-2 tablets (4-8 mg) by mouth every 12 hours as needed for nausea    Migraine without status migrainosus, not intractable, unspecified migraine type       oxyCODONE IR 5 MG tablet    ROXICODONE    15 tablet    Take 0.5 tablets (2.5 mg) by mouth every 6 hours as needed for severe pain    Postlaminectomy syndrome       PROBIOTIC DAILY PO      Take 2 packets by mouth every morning        propranolol 80 MG 24 hr capsule    INDERAL LA          Propranolol HCl SR Beads 80 MG Cp24     90 capsule    Take 80 mg by mouth daily    Sinus tachycardia       rizatriptan 5 MG ODT tab     MAXALT-MLT     TAKE 1-2 TABLETS (5-10 MG) BY MOUTH AT ONSET OF HEADACHE FOR MIGRAINE (MAX 30 MG IN 24 HOURS)        SUMAtriptan 6 MG/0.5ML pen injector kit    IMITREX STATDOSE    2 kit    Inject 0.5 mLs (6 mg) Subcutaneous at onset of headache for migraine (may repeat once after 2 hrs) May repeat in 1 hour. Max 12 mg/24 hours.    Chronic migraine without aura without status migrainosus, not intractable       SYNTHROID 25 MCG tablet   Generic drug:  levothyroxine      Take 25 mcg by mouth daily        tiZANidine 4 MG tablet    ZANAFLEX    120 tablet    Take 1 tablet (4 mg) by mouth 4 times daily as needed for muscle spasms        traMADol 50 MG tablet    ULTRAM    60 tablet    Take 1 tablet (50 mg) by mouth every 6 hours as needed for severe pain (Take scheduled every 6 hours for one week, then resume taking as needed)    Postlaminectomy syndrome       VITAMIN D (CHOLECALCIFEROL) PO      Take 2,000 Units by mouth daily        ZONISAMIDE PO      Take 75 mg by mouth daily

## 2018-07-16 NOTE — PROGRESS NOTES
I had the pleasure of meeting Ada Welch in consultation today at the [facility].     History of Present Illness:   Patient is a 47-year-old woman who is referred for evaluation of lymphadenopathy.  She says that she did not ever noticed this personally but it was found locally on imaging. 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  This was about 2 years ago at .  She has been followed by an oncologist locally and had CT scans every 6 months as well as ultrasounds every couple of months to monitor the nodes.  She says that they notice changes in them but she has not had any issues.  She has thought that she has had some ear pain related to an ear infection.  She did have a biopsy on 1 of the nodes last spring at  and her understanding was this was benign.  She has now transferred all of her care to the [place] as she moved to [place].  Separately she has issues with choking on food regularly particularly meats.  She says she had a swallow study in spring which we do not have.  She says she is followed by GI for these issues.  She has gained about 15 pounds in the last 1.5 weeks secondary to steroid use for dysautonomia.      Social history: Quit smoking about 3 years ago and previously a 3-4 cigarette per day smoker for 15 years.  No chewing tobacco use.  No regular alcohol use.    Past surgical history: Tonsillectomy, septoplasty ×2, tubal ligation, lumbar fusion and revision     Past medical history: Largely notable for Tom-Danlos, otherwise see below    Family history: Negative for lymphoma    MEDICATIONS:     Current Outpatient Prescriptions   Medication Sig Dispense Refill     ACETAMINOPHEN PO Take 1,000 mg by mouth every 6 hours as needed for pain Take with tramadol       albuterol (PROAIR HFA/PROVENTIL HFA/VENTOLIN HFA) 108 (90 BASE) MCG/ACT Inhaler Inhale 2 puffs into the lungs  every 6 hours as needed        AMITRIPTYLINE HCL PO Take 40 mg by mouth daily for two weeks, then increase to 50 mg daily.       buprenorphine (BUTRANS) 10 MCG/HR WK patch Place 1 patch onto the skin every 7 days 4 patch 2     butalbital-acetaminophen-caffeine (FIORICET/ESGIC) -40 MG per tablet TAKE ONE TABLET BY MOUTH EVERY 4 HOURS AS NEEDED  0     cetirizine (ZYRTEC) 10 MG tablet Take 10 mg by mouth daily        COMPRESSION STOCKINGS 1 each daily 20-30 mmHg Thigh Length measure and fit, color and style per patient preference. Doff n jose per need. 3 each 3     DULOXETINE HCL PO Take 60 mg by mouth in the morning and 30 mg in the evening.       eletriptan (RELPAX) 20 MG tablet Take 1 tablet (20 mg) by mouth at onset of headache for migraine May repeat in 2 hours. Max 4 tablets/24 hours. 18 tablet 1     EPINEPHrine (EPIPEN/ADRENACLICK/OR ANY BX GENERIC EQUIV) 0.3 MG/0.3ML injection 2-pack Inject 0.3 mg into the muscle       famotidine (PEPCID) 20 MG tablet Take 1 tablet (20 mg) by mouth At Bedtime 90 tablet 1     fludrocortisone (FLORINEF) 0.1 MG tablet Take 2 tablets (0.2 mg) by mouth daily 180 tablet 3     fluticasone (FLONASE) 50 MCG/ACT spray INSTILL 2 SPRAYS INTO BOTH NOSTRILS DAILY 16 mL 3     fluticasone-salmeterol (ADVAIR DISKUS) 250-50 MCG/DOSE diskus inhaler Inhale 1 puff into the lungs 2 times daily as needed        levothyroxine (SYNTHROID) 25 MCG tablet Take 25 mcg by mouth daily       linaclotide (LINZESS) 145 MCG capsule Take 1 capsule (145 mcg) by mouth every morning (before breakfast) Last refill until GI clinic follow up. 30 capsule 0     medical cannabis (Patient's own supply.  Not a prescription) Take 1 Dose by mouth See Admin Instructions (This is NOT a prescription, and does not certify that the patient has a qualifying medical condition for medical cannabis.  The purpose of this order is  to document that the patient reports taking medical cannabis.)       METOCLOPRAMIDE HCL PO Take  5-10 mg by mouth every 6 hours as needed       Multiple Vitamins-Minerals (MULTIVITAMIN PO) Take 1 tablet by mouth daily        OLANZapine (ZYPREXA) 5 MG tablet Take 1 tablet (5 mg) by mouth daily as needed (headache pain, nausea) 10 tablet 0     omeprazole (PRILOSEC) 20 MG CR capsule Take 1 capsule (20 mg) by mouth daily 90 capsule 2     ondansetron (ZOFRAN-ODT) 4 MG ODT tab Take 1-2 tablets (4-8 mg) by mouth every 12 hours as needed for nausea 60 tablet 1     oxyCODONE IR (ROXICODONE) 5 MG tablet Take 0.5 tablets (2.5 mg) by mouth every 6 hours as needed for severe pain 15 tablet 0     Probiotic Product (PROBIOTIC DAILY PO) Take 2 packets by mouth every morning        propranolol (INDERAL LA) 80 MG 24 hr capsule   2     Propranolol HCl SR Beads 80 MG CP24 Take 80 mg by mouth daily 90 capsule 3     rizatriptan (MAXALT-MLT) 5 MG ODT tab TAKE 1-2 TABLETS (5-10 MG) BY MOUTH AT ONSET OF HEADACHE FOR MIGRAINE (MAX 30 MG IN 24 HOURS)  6     SUMAtriptan (IMITREX STATDOSE) 6 MG/0.5ML pen injector kit Inject 0.5 mLs (6 mg) Subcutaneous at onset of headache for migraine (may repeat once after 2 hrs) May repeat in 1 hour. Max 12 mg/24 hours. 2 kit 1     tiZANidine (ZANAFLEX) 4 MG tablet Take 1 tablet (4 mg) by mouth 4 times daily as needed for muscle spasms 120 tablet 3     traMADol (ULTRAM) 50 MG tablet Take 1 tablet (50 mg) by mouth every 6 hours as needed for severe pain (Take scheduled every 6 hours for one week, then resume taking as needed) 60 tablet 0     VITAMIN D, CHOLECALCIFEROL, PO Take 2,000 Units by mouth daily       ZONISAMIDE PO Take 75 mg by mouth daily         ALLERGIES:    Allergies   Allergen Reactions     Bee Venom Anaphylaxis, Difficulty breathing, Palpitations, Shortness Of Breath and Visual Disturbance     Patient does carry Epi-Pen     No Clinical Screening - See Comments Anaphylaxis     Chicken-Derived Products (Egg) Diarrhea and Nausea     Patient will self monitor  Other reaction(s): Nausea  Patient  will self monitor  Other reaction(s): GI intolerance     Compazine [Prochlorperazine] Other (See Comments)     Extreme jittery     Gabapentin      Gluten Meal Other (See Comments)     Topiramate      Wheat Diarrhea     Wheat Bran Nausea     Patient will self monitor  Other reaction(s): Nausea  Patient will self monitor       HABITS/SOCIAL HISTORY:   Quit smoking about 3 years ago and previously a 3-4 cigarette per day smoker for 15 years.  No chewing tobacco use.  No regular alcohol use.    Social History     Social History     Marital status:      Spouse name: Carry     Number of children: 5     Years of education: N/A     Occupational History     Not on file.     Social History Main Topics     Smoking status: Former Smoker     Packs/day: 0.20     Years: 10.00     Types: Cigarettes     Start date: 1/1/1991     Quit date: 12/1/2013     Smokeless tobacco: Never Used      Comment: I somked on and off during specified dates.     Alcohol use Yes      Comment: Less than 5 drinks per year     Drug use: No     Sexual activity: Yes     Partners: Male     Birth control/ protection: Female Surgical     Other Topics Concern     Parent/Sibling W/ Cabg, Mi Or Angioplasty Before 65f 55m? 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     Chronic sinusitis 00/2007    Diagnosed with chronic pansinusitis 2016     Chronic tonsillitis 1980    Had tonsils removed 02/1981, rheumatic fever     Tom-Danlos disease      Gastroesophageal reflux disease 3/1/2017    I have large hiatal hernia     Hernia, abdominal  10/2016    Hiatal Hernia     Hiatal hernia 2016    Have adeline hiatel hernia     Hoarseness Unsure    It comes and goes     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     Migraines 1/1/2007    Unsure of exact date     Nasal polyps 2013    Were revoved 03/2017, benign     Other nervous system complications 1/2014    Autonomic nervous system disorder, neuralgia, neuropathy     Pneumonia Unsure    Have had pneumonia several times     Swelling, mass, or lump in head and neck 08/2016    Lymph node has been growing for last year     Thyroid disease 01/01/2008        PAST SURGICAL HISTORY:   Past Surgical History:   Procedure Laterality Date     ADENOIDECTOMY  1981     AS REPAIR OF NASAL SEPTUM       BACK SURGERY  12/09/2013  10/01/2014    Spinal fusion L4-S1, L5 moving 5/8ths in. Remove screws/rods     BIOPSY  01/01/2008 & 3/2017    mole biopsy, lymph node biopsy     COLONOSCOPY  3/2017    Colonoscopy and GI     NASAL/SINUS POLYPECTOMY  2017    Polyps were benign     TONSILLECTOMY       TONSILLECTOMY  1981     TUBAL LIGATION         FAMILY HISTORY:    Family History   Problem Relation Age of Onset     Connective Tissue Disorder Mother      eds     Connective Tissue Disorder Sister      eds     Cancer Father      Spinal tumor grew into spinal cord, went in brain     Migraines Father      Diabetes Maternal Grandmother      Elderly diabetes     HEART DISEASE Maternal Grandmother      Hypertension Maternal Grandmother      Diabetes Paternal Grandmother      Elderly diabetes     Diabetes Paternal Grandfather      Elderly diabetes     Asthma Sister      Pediatric Asthma     Migraines Sister      Asthma Son      Pediatric Asthma     Thyroid Disease Sister      ,     Migraines Sister      Migraines Sister      Breast Cancer No family hx of        REVIEW OF SYSTEMS:  12 point ROS was negative other than the symptoms noted above in the HPI.  Patient Supplied Answers to Review of Systems   ENT ROS 7/10/2018  "  Constitutional Weight gain, Appetite change, Unexplained fatigue, Problems with sleep, Unexplained fever or night sweats   Neurology Dizzy spells, Numbness, Unexplained weakness, Headache   Eyes Double vision   Ears, Nose, Throat Hearing loss, Ear pain, Ringing/noise in ears, Nasal congestion or drainage, Sore throat, Trouble swallowing, Hoarseness, Coughing up blood   Cardiopulmonary Breathing problems, Chest pain   Gastrointestinal/Genitourinary Heartburn/indigestion, Unexplained nausea/vomiting, Pain with urination, Constipation, Diarrhea   Musculoskeletal Sore or stiff joints, Swollen joints, Back pain, Neck pain, Swollen legs/feet   Allergy/Immunology Allergies or hay fever, Skin changes, Rash   Hematologic Bleeding problems, Easy bruising, Lymph node swelling   Endocrine Thirst, Heat or cold intolerance, Frequent urination   Skin Change in moles, Skin lesions   Other Rash, Problems with anesthesia in surgery         PHYSICAL EXAMINATION:   Ht 1.676 m (5' 6\")  Wt 93 kg (205 lb)  BMI 33.09 kg/m2   Appearance:   normal; NAD, age-appropriate appearance, well-developed, obese   Communication:   normal; communicates verbally, normal voice quality   Head/Face:   inspection -  Normal; no scars or visible lesions   Salivary glands -  Normal size, no tenderness, swelling, or palpable masses   Facial strength -  Normal and symmetric bilateral; H/B I/VI   Skin:  normal, no rash   Ocular Motility:  normal occular movements   Ears:  auricle (AD) -  normal  EAC (AD) -  normal  TM (AD) -  Normal, no effusion  auricle (AS) -  normal  EAC (AS) -  normal  TM (AS) -  Normal, no effusion  Normal clinical speech reception   Nose:  Ext. inspection -  Normal   Oral Cavity:  lips -  Normal mucosa, oral competence, and stoma size   Age-appropriate dentition, healthy gingival mucosa   Hard palate, buccal, floor of mouth mucosa normal   Tongue - normal movement, no lesions   Oropharynx:  mucosa -  Normal, no lesions  soft palate -  " Normal, no lesions, no asymmetry, normal elevation  tonsils -  absent   Neck: No visible mass or asymmetry   Normal palpation, no tenderness, no tracheal deviation  Normal range of motion   Lymphatic:  no abnormal nodes   Cardiovascular:  warm, pink, well-perfused extremities without swelling, tenderness, or edema   Respiratory:  Normal respiratory effort, no stridor   Neuro/Psych.:  mood/affect -  normal  mental status -  normal  cranial nerves -  normal            RESULTS REVIEWED:   I reviewed the ultrasound report as well as a images from a recent CT scan in 2016 from [facility].  This shows small lymph nodes, with normal morphology on U/S    U/S:  FINDINGS:     Lymph nodes are measured bilaterally with measurements given in  craniocaudal, transverse and AP dimensions as follows:     Right:  Level 1: None.  Level 2: 1.7 x 0.6 x 1.9 cm with a preserved fatty hilum.  Level 3: 0.7 x 0.2 x 1.0 cm with replaced fatty hilum but no increased  vascularization.  Level 4: 0.4 x 0.3 x 0.5 cm with replaced fatty hilum but no increased  vascularization.  Level 5: None.  Level 6: None.  Level 7: None.     Left:  Level 1: None.  Level 2: There are 2 lymph nodes measuring 1.8 x 0.7 x 2.3 cm with a  preserved fatty hilum and measuring 1.0 x 0.6 x 1.4 cm with a  preserved fatty hilum.  Level 3: None.  Level 4: None.  Level 5: None.  Level 6: None.  Level 7: None.     Partially visualized thyroid gland with no significant sonographic  findings.         IMPRESSION:  1.  Soft tissue neck ultrasound with lymph node measurements as  described above. Mildly prominent right and left level 2 lymph nodes  were thought to reflect reactive changes.      IMPRESSION AND PLAN:   Patient is a 47-year-old woman with a history of Tom-Danlos syndrome.  She comes in for evaluation of lymphadenopathy.  Her recent ultrasound here shows some reactive lymph nodes.  Her CT scan from September 2016 show some small lymph nodes that are not overly  concerning.  She reportedly had a biopsy locally.  I had like to obtain the outside path slides and have them reviewed here. Based on these we could always consider doing a repeat FNA under U/S guidance. I do not think I would plan on regular CT scans of her neck for evaluation of this.  She can f/u PRN.    Thank you very much for the opportunity to participate in the care of your patient.      Karla Killian M.D.  Otolaryngology- Head & Neck Surgery        CC:  Ellyn Rivera MD  Department of Internal Medicine  [facility]

## 2018-07-16 NOTE — PATIENT INSTRUCTIONS
1. We will have your pathology slides reviewed with our pathologist and call you with any further steps.   2. Please call the ENT clinic with any questions,concerns, new or worsening symptoms.    -Clinic number is [phone]   - Daisy's direct line (Dr. Killian's nurse) [phone]

## 2018-07-17 ENCOUNTER — OFFICE VISIT (OUTPATIENT)
Dept: NEUROLOGY | Facility: CLINIC | Age: 48
End: 2018-07-17
Payer: MEDICARE

## 2018-07-17 ENCOUNTER — OFFICE VISIT (OUTPATIENT)
Dept: HEMATOLOGY | Facility: CLINIC | Age: 48
End: 2018-07-17
Attending: INTERNAL MEDICINE
Payer: COMMERCIAL

## 2018-07-17 VITALS
HEART RATE: 71 BPM | HEIGHT: 66 IN | WEIGHT: 206.3 LBS | DIASTOLIC BLOOD PRESSURE: 85 MMHG | BODY MASS INDEX: 33.16 KG/M2 | SYSTOLIC BLOOD PRESSURE: 124 MMHG

## 2018-07-17 VITALS
BODY MASS INDEX: 31.86 KG/M2 | RESPIRATION RATE: 12 BRPM | WEIGHT: 203 LBS | DIASTOLIC BLOOD PRESSURE: 78 MMHG | TEMPERATURE: 97.9 F | HEART RATE: 67 BPM | HEIGHT: 67 IN | OXYGEN SATURATION: 98 % | SYSTOLIC BLOOD PRESSURE: 121 MMHG

## 2018-07-17 DIAGNOSIS — G43.719 INTRACTABLE CHRONIC MIGRAINE WITHOUT AURA AND WITHOUT STATUS MIGRAINOSUS: ICD-10-CM

## 2018-07-17 DIAGNOSIS — R47.81 SLURRED SPEECH: ICD-10-CM

## 2018-07-17 DIAGNOSIS — R59.0 LYMPHADENOPATHY OF LEFT CERVICAL REGION: ICD-10-CM

## 2018-07-17 DIAGNOSIS — H53.2 DIPLOPIA: ICD-10-CM

## 2018-07-17 DIAGNOSIS — Z09 HOSPITAL DISCHARGE FOLLOW-UP: Primary | ICD-10-CM

## 2018-07-17 DIAGNOSIS — Q79.60 EHLERS-DANLOS SYNDROME: Primary | ICD-10-CM

## 2018-07-17 DIAGNOSIS — G43.811 OTHER MIGRAINE WITH STATUS MIGRAINOSUS, INTRACTABLE: ICD-10-CM

## 2018-07-17 DIAGNOSIS — Q79.60 EHLERS-DANLOS SYNDROME: ICD-10-CM

## 2018-07-17 PROCEDURE — G0463 HOSPITAL OUTPT CLINIC VISIT: HCPCS

## 2018-07-17 PROCEDURE — 99214 OFFICE O/P EST MOD 30 MIN: CPT | Performed by: INTERNAL MEDICINE

## 2018-07-17 RX ORDER — OLANZAPINE 5 MG/1
5 TABLET ORAL DAILY PRN
Qty: 30 TABLET | Refills: 3 | Status: SHIPPED | OUTPATIENT
Start: 2018-07-17 | End: 2018-11-08

## 2018-07-17 RX ORDER — METOCLOPRAMIDE 5 MG/1
5-10 TABLET ORAL EVERY 6 HOURS PRN
Qty: 180 TABLET | Refills: 1 | Status: SHIPPED | OUTPATIENT
Start: 2018-07-17 | End: 2019-01-17

## 2018-07-17 ASSESSMENT — PAIN SCALES - GENERAL
PAINLEVEL: SEVERE PAIN (6)
PAINLEVEL: SEVERE PAIN (7)

## 2018-07-17 NOTE — PROGRESS NOTES
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  She is here to review her clinical course over the course of almost the past 1 year and get recommendations in the perioperative setting for a potentially planned pelvic floor surgery.      BACKGROUND HISTORY:  Please refer to my note from her original visit for details of her presentation.      INTERVAL HISTORY:  Ada reports that she has been doing reasonably well living with the EDS .  Of lately, she has noticed that her lymph nodes are getting bigger and actually not going down.  She has had further evaluation with ENT and was found to have 5 lymph nodes.  I actually saw an ENT physician yesterday at the [facility], who is obtaining her prior records from the biopsy that she had done last year to plan for further interventions including excisional biopsy.  She states that she continues to choke on food, and around the Memorial Day weekend she had an episode where she choked on a piece of meat and when that came back up she noticed a small blood clot associated with the mucus that came out.  She has been seen by our Gastroenterology colleagues and already has a followup setup to see them to have further discussion regarding this.  Also, she brings to my attention that she has pelvic floor issues with prolapse and has been seen in [facility] where they have recommended pelvic floor physical therapy and if that does not improve, then she would be getting pelvic floor surgery.  The timing of that has not been decided yet.  Otherwise, she denies any new complaints.  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      MEDICAL SURGICAL HISTORY:  Updated in the chart.      SOCIAL HISTORY:  Unchanged.     "  FAMILY HISTORY:  She is expecting to have another grandchild this February.  No other new information beyond this.      MEDICATIONS, ALLERGIES:      PHYSICAL EXAMINATION:     VITAL SIGNS: /78 (BP Location: Left arm, Patient Position: Sitting, Cuff Size: Adult Regular)  Pulse 67  Temp 97.9  F (36.6  C) (Oral)  Resp 12  Ht 1.689 m (5' 6.5\")  Wt 92.1 kg (203 lb)  SpO2 98%  BMI 32.27 kg/m2  GENERAL:  Alert and oriented x3, no apparent distress.   HEENT:  Moist mucous membrane.   EXTREMITIES:  Lower extremities with no pedal edema.   NEUROLOGIC:  Appropriate affect.  Cranial nerves II-XII intact.  She is wearing a neck brace.      ASSESSMENT AND PLAN:     1.  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  At this point, if the patient undergoes any surgical intervention given that she has had documented high factor VIII levels in physiological settings, I would recommend putting her on anticoagulation postop for at least 10-14 days depending on the kind of surgery.  I had a detailed discussion with the patient regarding the nature of anticoagulation using direct oral anticoagulant agents, like  rivaroxaban or apixaban versus using parenteral anticoagulant agents like Lovenox at a prophylactic-intensity 40 mg and the patient chooses to go with Lovenox as she states that in past oral absorption for her for different medications has been patchy.  She will call our center once the surgery is scheduled, so that we can send a prescription for Lovenox.   2.  Lymphadenopathy.  She has had an evaluation with an FNA last year where they did leukemia lymphoma evaluation and did not find anything on the aspirate.  She is seeing ENT and they are doing further workup and management.  I will defer to the ENT physicians.   3.  Multiple issues related to EDS, joint aches and pain and lymphadenopathy.  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  I had a discussion " with her that one of the hematological manifestations of Churg-Analilia syndrome is eosinophilia which has not been documented in her case ever, but the patient strongly feels that she needs to see a rheumatologist.  Her prior rheumatologist has retired, so I will send a referral.  I did caution her that the wait time for our Rheumatology colleagues is a bit long, but she is willing to wait for that.  Also, we will help her setup an appointment.     I spent 35 minutes in the care of this patient >50% of which was spent in coordinating and counseling.      Cynthia Leach   of Medicine   Hematology and medical Oncology   [facility]

## 2018-07-17 NOTE — MR AVS SNAPSHOT
After Visit Summary   7/17/2018    Ada Welch    MRN: [id number]           Patient Information     Date Of Birth          1970        Visit Information        Provider Department      7/17/2018 10:30 AM Noemy Nguyen MD [facility]        Today's Diagnoses     Intractable chronic migraine without aura and without status migrainosus    -  1    Diplopia        Slurred speech        Tom-Danlos syndrome        Other migraine with status migrainosus, intractable           Follow-ups after your visit        Follow-up notes from your care team     Call patient with results Return if symptoms worsen or fail to improve.      Your next 10 appointments already scheduled     Jul 17, 2018 12:30 PM CDT   RETURN CLOTTING DISORDER with Cynthia Leach MD   [facility] ([facility])    [address]   [address]  [facility] [address] [phone]            Jul 23, 2018 11:25 AM CDT   (Arrive by 11:10 AM)   Return Visit with Shilpa Galicia MD   [facility] ([facility])    [address] [place] [address]  4th Floor  [facility] [id number] [phone]            Jul 23, 2018 12:15 PM CDT   (Arrive by 12:00 PM)   New Allergy with Vahe Rodriguez MD   [facility] ([facility])    [address] [place] [address]  [address] [place] [id number] [phone]           Do not take anti-histamines or Zantac for seven day prior to your appointment.            Jul 25, 2018  1:00 PM CDT   MR BRAIN W/O & W CONTRAST with UUMR2   [facility] [place], MRI ([facility], [facility])    [address] [facility] [id number] [phone]           Take your medicines as usual, unless your doctor tells you not to. Bring a list of your current medicines to your exam (including  vitamins, minerals and over-the-counter drugs).  You may or may not receive intravenous (IV) contrast for this exam pending the discretion of the Radiologist.  You do not need to do anything special to prepare.  The MRI machine uses a strong magnet. Please wear clothes without metal (snaps, zippers). A sweatsuit works well, or we may give you a hospital gown.  Please remove any body piercings and hair extensions before you arrive. You will also remove watches, jewelry, hairpins, wallets, dentures, partial dental plates and hearing aids. You may wear contact lenses, and you may be able to wear your rings. We have a safe place to keep your personal items, but it is safer to leave them at home.  **IMPORTANT** THE INSTRUCTIONS BELOW ARE ONLY FOR THOSE PATIENTS WHO HAVE BEEN PRESCRIBED SEDATION OR GENERAL ANESTHESIA DURING THEIR MRI PROCEDURE:  IF YOUR DOCTOR PRESCRIBED ORAL SEDATION (take medicine to help you relax during your exam):   You must get the medicine from your doctor (oral medication) before you arrive. Bring the medicine to the exam. Do not take it at home. You ll be told when to take it upon arriving for your exam.   Arrive one hour early. Bring someone who can take you home after the test. Your medicine will make you sleepy. After the exam, you may not drive, take a bus or take a taxi by yourself.  IF YOUR DOCTOR PRESCRIBED IV SEDATION:   Arrive one hour early. Bring someone who can take you home after the test. Your medicine will make you sleepy. After the exam, you may not drive, take a bus or take a taxi by yourself.   No eating 6 hours before your exam. You may have clear liquids up until 4 hours before your exam. (Clear liquids include water, clear tea, black coffee and fruit juice without pulp.)  IF YOUR DOCTOR PRESCRIBED ANESTHESIA (be asleep for your exam):   Arrive 1 1/2 hours early. Bring someone who can take you home after the test. You may not drive, take a bus or take a taxi by yourself.   No  eating 8 hours before your exam. You may have clear liquids up until 4 hours before your exam. (Clear liquids include water, clear tea, black coffee and fruit juice without pulp.)   You will spend four to five hours in the recovery room.  Please call the Imaging Department at your exam site with any questions.            Jul 25, 2018  2:00 PM CDT   MR HEAD W/O & W CONTRAST ANGIOGRAM with UUMR2   [facility] [place], [facility] ([facility], [facility])    [address] [facility] [address] [phone]           Take your medicines as usual, unless your doctor tells you not to. Bring a list of your current medicines to your exam (including vitamins, minerals and over-the-counter drugs).  You may or may not receive intravenous (IV) contrast for this exam pending the discretion of the Radiologist.  You do not need to do anything special to prepare.  The MRI machine uses a strong magnet. Please wear clothes without metal (snaps, zippers). A sweatsuit works well, or we may give you a hospital gown.  Please remove any body piercings and hair extensions before you arrive. You will also remove watches, jewelry, hairpins, wallets, dentures, partial dental plates and hearing aids. You may wear contact lenses, and you may be able to wear your rings. We have a safe place to keep your personal items, but it is safer to leave them at home.  **IMPORTANT** THE INSTRUCTIONS BELOW ARE ONLY FOR THOSE PATIENTS WHO HAVE BEEN PRESCRIBED SEDATION OR GENERAL ANESTHESIA DURING THEIR MRI PROCEDURE:  IF YOUR DOCTOR PRESCRIBED ORAL SEDATION (take medicine to help you relax during your exam):   You must get the medicine from your doctor (oral medication) before you arrive. Bring the medicine to the exam. Do not take it at home. You ll be told when to take it upon arriving for your exam.   Arrive one hour early. Bring someone who can take you home after the test. Your medicine will make you sleepy.  After the exam, you may not drive, take a bus or take a taxi by yourself.  IF YOUR DOCTOR PRESCRIBED IV SEDATION:   Arrive one hour early. Bring someone who can take you home after the test. Your medicine will make you sleepy. After the exam, you may not drive, take a bus or take a taxi by yourself.   No eating 6 hours before your exam. You may have clear liquids up until 4 hours before your exam. (Clear liquids include water, clear tea, black coffee and fruit juice without pulp.)  IF YOUR DOCTOR PRESCRIBED ANESTHESIA (be asleep for your exam):   Arrive 1 1/2 hours early. Bring someone who can take you home after the test. You may not drive, take a bus or take a taxi by yourself.   No eating 8 hours before your exam. You may have clear liquids up until 4 hours before your exam. (Clear liquids include water, clear tea, black coffee and fruit juice without pulp.)   You will spend four to five hours in the recovery room.  Please call the Imaging Department at your exam site with any questions.            Jul 25, 2018  3:00 PM CDT   MR NECK W CONTRAST ANGIOGRAM with UUMR2   [facility] [place], [facility] ([facility], [facility])    [address] [facility] [address] [phone]           Take your medicines as usual, unless your doctor tells you not to. Bring a list of your current medicines to your exam (including vitamins, minerals and over-the-counter drugs).  You may or may not receive intravenous (IV) contrast for this exam pending the discretion of the Radiologist.  You do not need to do anything special to prepare.  The MRI machine uses a strong magnet. Please wear clothes without metal (snaps, zippers). A sweatsuit works well, or we may give you a hospital gown.  Please remove any body piercings and hair extensions before you arrive. You will also remove watches, jewelry, hairpins, wallets, dentures, partial dental plates and hearing aids. You may wear contact lenses,  and you may be able to wear your rings. We have a safe place to keep your personal items, but it is safer to leave them at home.  **IMPORTANT** THE INSTRUCTIONS BELOW ARE ONLY FOR THOSE PATIENTS WHO HAVE BEEN PRESCRIBED SEDATION OR GENERAL ANESTHESIA DURING THEIR MRI PROCEDURE:  IF YOUR DOCTOR PRESCRIBED ORAL SEDATION (take medicine to help you relax during your exam):   You must get the medicine from your doctor (oral medication) before you arrive. Bring the medicine to the exam. Do not take it at home. You ll be told when to take it upon arriving for your exam.   Arrive one hour early. Bring someone who can take you home after the test. Your medicine will make you sleepy. After the exam, you may not drive, take a bus or take a taxi by yourself.  IF YOUR DOCTOR PRESCRIBED IV SEDATION:   Arrive one hour early. Bring someone who can take you home after the test. Your medicine will make you sleepy. After the exam, you may not drive, take a bus or take a taxi by yourself.   No eating 6 hours before your exam. You may have clear liquids up until 4 hours before your exam. (Clear liquids include water, clear tea, black coffee and fruit juice without pulp.)  IF YOUR DOCTOR PRESCRIBED ANESTHESIA (be asleep for your exam):   Arrive 1 1/2 hours early. Bring someone who can take you home after the test. You may not drive, take a bus or take a taxi by yourself.   No eating 8 hours before your exam. You may have clear liquids up until 4 hours before your exam. (Clear liquids include water, clear tea, black coffee and fruit juice without pulp.)   You will spend four to five hours in the recovery room.  Please call the Imaging Department at your exam site with any questions.            Aug 08, 2018  2:15 PM CDT   (Arrive by 2:00 PM)   Return Visit with Wade Singh MD   [facility] ([facility])    [address]  [address]   [phone]            Aug 08, 2018  4:20 PM CDT   (Arrive by 4:05 PM)   Return Visit with Juana Griffith MD   [facility] ([facility])    [address] [place] [address]   [phone]            Aug 13, 2018  1:40 PM CDT   (Arrive by 1:25 PM)   NEW THROAT with Seema Eubanks MD   [facility] ([facility])    [address] [place] [address]   [phone]           This is a multi-disciplinary care team visit as patients with your type of problem are usually seen by a team of an MD and a Speech-Language Pathologist (who is a specialist in disorders of the voice, throat, and breathing).  Please plan about 2 hours for your visit, which will likely include Laryngeal Function Studies, a Voice/Swallow/Breathing Evaluation, and an Endoscopic Laryngeal Examination to provide a comprehensive evaluation.  Please check with your insurance company to ensure you are covered for these services. - It is important to know that if you are evaluated and/or treated by both a physician and a speech pathologist during your visit, your billing will reflect the input that you receive from both providers as separate professionals. Although most insurance plans do cover these services, we encourage you to contact your insurance company prior to your visit to determine whether there are any coverage limitations that might affect you financially. - Billing/procedure codes that are frequently associated with visits to our clinic include (but are not limited to) the ones listed below. Most patients will not need all of these items, but it may be useful to ask your insurance company's patient . 02555: Flexible fiberoptic laryngoscopy, 61396: Laryngoscopy; flexible or rigid fiberoptic, with stroboscopy, 68923: Flexible endoscopic evaluation of  swallowing, 97628: Laryngeal function aerodynamic evaluation, 43790: Evaluation of Voice and Resonance, 76448: Speech pathology treatment for voice, speech, communication, 39889: Speech pathology treatment for swallowing/oral function for feeding - If you have any concerns or questions, or if you would prefer not to have a speech pathologist involved in your visit, please contact our Clinic Coordinator at [phone], at least 24 hours prior to your appointment.            Aug 13, 2018  1:40 PM CDT   (Arrive by 1:25 PM)   [facility] Speech Pathology and ENT Evaluation with  Ent Dysphonia Slp Provider   [facility] ([facility])    [address] [facility]  [address] [facility] [address] [phone]              Future tests that were ordered for you today     Open Future Orders        Priority Expected Expires Ordered    MRI Brain w & w/o contrast Routine  7/17/2019 7/17/2018    MRI Angiogram head w & w/o contrast Routine  7/17/2019 7/17/2018    MRI Angiogram neck w contrast Routine  7/17/2019 7/17/2018            Who to contact     Please call your clinic at [phone] to:    Ask questions about your health    Make or cancel appointments    Discuss your medicines    Learn about your test results    Speak to your doctor            Additional Information About Your Visit        [employer] Information     [employer] gives you secure access to your electronic health record. If you see a primary care provider, you can also send messages to your care team and make appointments. If you have questions, please call your primary care clinic.  If you do not have a primary care provider, please call [phone] and they will assist you.      [employer] is an electronic gateway that provides easy, online access to your medical records. With [employer], you can request a clinic appointment, read your test results, renew a prescription or communicate with your care team.     To access your existing account,  "please contact your [facility] or call [phone] for assistance.        Care EveryWhere ID     This is your Care EveryWhere ID. This could be used by other organizations to access your [facility] medical records  [id number]        Your Vitals Were     Pulse Height BMI (Body Mass Index)             71 1.676 m (5' 6\") 33.3 kg/m2          Blood Pressure from Last 3 Encounters:   07/17/18 124/85   07/09/18 132/88   06/29/18 115/76    Weight from Last 3 Encounters:   07/17/18 93.6 kg (206 lb 4.8 oz)   07/16/18 93 kg (205 lb)   07/07/18 94.9 kg (209 lb 3.5 oz)                 Today's Medication Changes          These changes are accurate as of 7/17/18 11:05 AM.  If you have any questions, ask your nurse or doctor.               These medicines have changed or have updated prescriptions.        Dose/Directions    metoclopramide 5 MG tablet   Commonly known as:  REGLAN   This may have changed:  medication strength   Used for:  Intractable chronic migraine without aura and without status migrainosus   Changed by:  Noemy Nguyen MD        Dose:  5-10 mg   Take 1-2 tablets (5-10 mg) by mouth every 6 hours as needed   Quantity:  180 tablet   Refills:  1            Where to get your medicines      These medications were sent to [facility] [address] [place] [address] [address]     Phone:  [phone]     metoclopramide 5 MG tablet    OLANZapine 5 MG tablet                Primary Care Provider Office Phone # Fax #    Ellyn Rivera [phone] [address] [address]        Equal Access to Services     [facility] BROOKS : Hadchantel Slater, herve luagustín, qaybta kaalmaodette devine. So [facility] [phone].    ATENCIÓN: Si habla español, tiene a sweeney disposición servicios gratuitos de asistencia lingüística. Llame al [phone].    We comply with " applicable federal civil rights laws and [place] laws. We do not discriminate on the basis of race, color, national origin, age, disability, sex, sexual orientation, or gender identity.            Thank you!     Thank you for choosing [facility]  for your care. Our goal is always to provide you with excellent care. Hearing back from our patients is one way we can continue to improve our services. Please take a few minutes to complete the written survey that you may receive in the mail after your visit with us. Thank you!             Your Updated Medication List - Protect others around you: Learn how to safely use, store and throw away your medicines at www.disposemymeds.org.          This list is accurate as of 7/17/18 11:05 AM.  Always use your most recent med list.                   Brand Name Dispense Instructions for use Diagnosis    ACETAMINOPHEN PO      Take 1,000 mg by mouth every 6 hours as needed for pain Take with tramadol        ADVAIR DISKUS 250-50 MCG/DOSE diskus inhaler   Generic drug:  fluticasone-salmeterol      Inhale 1 puff into the lungs 2 times daily as needed        albuterol 108 (90 Base) MCG/ACT Inhaler    PROAIR HFA/PROVENTIL HFA/VENTOLIN HFA     Inhale 2 puffs into the lungs every 6 hours as needed        AMITRIPTYLINE HCL PO      Take 40 mg by mouth daily for two weeks, then increase to 50 mg daily.        buprenorphine 10 MCG/HR WK patch    BUTRANS    4 patch    Place 1 patch onto the skin every 7 days    Postlaminectomy syndrome of lumbar region       butalbital-acetaminophen-caffeine -40 MG per tablet    FIORICET/ESGIC     TAKE ONE TABLET BY MOUTH EVERY 4 HOURS AS NEEDED        cetirizine 10 MG tablet    zyrTEC     Take 10 mg by mouth daily        COMPRESSION STOCKINGS     3 each    1 each daily 20-30 mmHg Thigh Length measure and fit, color and style per patient preference. Doff n jose per need.    Orthostatic hypotension       DULOXETINE HCL PO      Take 60 mg by  mouth in the morning and 30 mg in the evening.        eletriptan 20 MG tablet    RELPAX    18 tablet    Take 1 tablet (20 mg) by mouth at onset of headache for migraine May repeat in 2 hours. Max 4 tablets/24 hours.    Chronic daily headache, Intractable migraine without aura and with status migrainosus       EPINEPHrine 0.3 MG/0.3ML injection 2-pack    EPIPEN/ADRENACLICK/or ANY BX GENERIC EQUIV     Inject 0.3 mg into the muscle        famotidine 20 MG tablet    PEPCID    90 tablet    Take 1 tablet (20 mg) by mouth At Bedtime    Hiatal hernia, Mast cell disorder       fludrocortisone 0.1 MG tablet    FLORINEF    180 tablet    Take 2 tablets (0.2 mg) by mouth daily    Autonomic dysfunction, Pre-syncope       fluticasone 50 MCG/ACT spray    FLONASE    16 mL    INSTILL 2 SPRAYS INTO BOTH NOSTRILS DAILY    Chronic rhinitis, unspecified type       linaclotide 145 MCG capsule    LINZESS    30 capsule    Take 1 capsule (145 mcg) by mouth every morning (before breakfast) Last refill until GI clinic follow up.    Chronic idiopathic constipation       medical cannabis (Patient's own supply.  Not a prescription)      Take 1 Dose by mouth See Admin Instructions (This is NOT a prescription, and does not certify that the patient has a qualifying medical condition for medical cannabis.  The purpose of this order is  to document that the patient reports taking medical cannabis.)        metoclopramide 5 MG tablet    REGLAN    180 tablet    Take 1-2 tablets (5-10 mg) by mouth every 6 hours as needed    Intractable chronic migraine without aura and without status migrainosus       MULTIVITAMIN PO      Take 1 tablet by mouth daily        OLANZapine 5 MG tablet    zyPREXA    30 tablet    Take 1 tablet (5 mg) by mouth daily as needed (headache pain, nausea)    Other migraine with status migrainosus, intractable, Intractable chronic migraine without aura and without status migrainosus       omeprazole 20 MG CR capsule    priLOSEC    90  capsule    Take 1 capsule (20 mg) by mouth daily    Hiatal hernia       ondansetron 4 MG ODT tab    ZOFRAN-ODT    60 tablet    Take 1-2 tablets (4-8 mg) by mouth every 12 hours as needed for nausea    Migraine without status migrainosus, not intractable, unspecified migraine type       oxyCODONE IR 5 MG tablet    ROXICODONE    15 tablet    Take 0.5 tablets (2.5 mg) by mouth every 6 hours as needed for severe pain    Postlaminectomy syndrome       PROBIOTIC DAILY PO      Take 2 packets by mouth every morning        propranolol 80 MG 24 hr capsule    INDERAL LA          Propranolol HCl SR Beads 80 MG Cp24     90 capsule    Take 80 mg by mouth daily    Sinus tachycardia       rizatriptan 5 MG ODT tab    MAXALT-MLT     TAKE 1-2 TABLETS (5-10 MG) BY MOUTH AT ONSET OF HEADACHE FOR MIGRAINE (MAX 30 MG IN 24 HOURS)        SUMAtriptan 6 MG/0.5ML pen injector kit    IMITREX STATDOSE    2 kit    Inject 0.5 mLs (6 mg) Subcutaneous at onset of headache for migraine (may repeat once after 2 hrs) May repeat in 1 hour. Max 12 mg/24 hours.    Chronic migraine without aura without status migrainosus, not intractable       SYNTHROID 25 MCG tablet   Generic drug:  levothyroxine      Take 25 mcg by mouth daily        tiZANidine 4 MG tablet    ZANAFLEX    120 tablet    Take 1 tablet (4 mg) by mouth 4 times daily as needed for muscle spasms        traMADol 50 MG tablet    ULTRAM    60 tablet    Take 1 tablet (50 mg) by mouth every 6 hours as needed for severe pain (Take scheduled every 6 hours for one week, then resume taking as needed)    Postlaminectomy syndrome       VITAMIN D (CHOLECALCIFEROL) PO      Take 2,000 Units by mouth daily        ZONISAMIDE PO      Take 75 mg by mouth daily

## 2018-07-17 NOTE — MR AVS SNAPSHOT
After Visit Summary   7/17/2018    Ada Welch    MRN: [id number]           Patient Information     Date Of Birth          1970        Visit Information        Provider Department      7/17/2018 12:30 PM Cynthia Leach MD [facility]        Today's Diagnoses     Tom-Danlos syndrome    -  1    Lymphadenopathy of left cervical region          Care Instructions    1.  Rheumatology referral.  [facility]   [address]oor 3, [address]  [address] [place] [address]   Appointments: [phone], option 2    2.  Call Ana Rosa with surgery dates & details.  We will come up with anticoagulation plan.  3.  See back in 1 year.          Follow-ups after your visit        Additional Services     Rheumatology IP Consult                 Follow-up notes from your care team     Return in about 1 year (around 7/17/2019).      Your next 10 appointments already scheduled     Jul 23, 2018 11:25 AM CDT   (Arrive by 11:10 AM)   Return Visit with Shilpa Galicia MD   [facility] ([facility])    [address] [place] [address]  4th Floor  [facility] [id number] [phone]            Jul 23, 2018 12:15 PM CDT   (Arrive by 12:00 PM)   New Allergy with Vahe Rodriguez MD   [facility] ([facility])    [address] [place] [address]  [address]  [facility] [id number] [phone]           Do not take anti-histamines or Zantac for seven day prior to your appointment.            Jul 25, 2018  1:00 PM CDT   MR BRAIN W/O & W CONTRAST with UUMR2   [facility] [place], MRI ([facility])    [address] [facility] [id number] [phone]           Take your medicines as usual, unless your doctor tells you not to. Bring a list of your current medicines to your exam (including vitamins,  minerals and over-the-counter drugs).  You may or may not receive intravenous (IV) contrast for this exam pending the discretion of the Radiologist.  You do not need to do anything special to prepare.  The MRI machine uses a strong magnet. Please wear clothes without metal (snaps, zippers). A sweatsuit works well, or we may give you a hospital gown.  Please remove any body piercings and hair extensions before you arrive. You will also remove watches, jewelry, hairpins, wallets, dentures, partial dental plates and hearing aids. You may wear contact lenses, and you may be able to wear your rings. We have a safe place to keep your personal items, but it is safer to leave them at home.  **IMPORTANT** THE INSTRUCTIONS BELOW ARE ONLY FOR THOSE PATIENTS WHO HAVE BEEN PRESCRIBED SEDATION OR GENERAL ANESTHESIA DURING THEIR MRI PROCEDURE:  IF YOUR DOCTOR PRESCRIBED ORAL SEDATION (take medicine to help you relax during your exam):   You must get the medicine from your doctor (oral medication) before you arrive. Bring the medicine to the exam. Do not take it at home. You ll be told when to take it upon arriving for your exam.   Arrive one hour early. Bring someone who can take you home after the test. Your medicine will make you sleepy. After the exam, you may not drive, take a bus or take a taxi by yourself.  IF YOUR DOCTOR PRESCRIBED IV SEDATION:   Arrive one hour early. Bring someone who can take you home after the test. Your medicine will make you sleepy. After the exam, you may not drive, take a bus or take a taxi by yourself.   No eating 6 hours before your exam. You may have clear liquids up until 4 hours before your exam. (Clear liquids include water, clear tea, black coffee and fruit juice without pulp.)  IF YOUR DOCTOR PRESCRIBED ANESTHESIA (be asleep for your exam):   Arrive 1 1/2 hours early. Bring someone who can take you home after the test. You may not drive, take a bus or take a taxi by yourself.   No eating 8  hours before your exam. You may have clear liquids up until 4 hours before your exam. (Clear liquids include water, clear tea, black coffee and fruit juice without pulp.)   You will spend four to five hours in the recovery room.  Please call the Imaging Department at your exam site with any questions.            Jul 25, 2018  2:00 PM CDT   MR HEAD W/O & W CONTRAST ANGIOGRAM with UUMR2   [facility] [place])    [address] [facility] [id number] [phone]           Take your medicines as usual, unless your doctor tells you not to. Bring a list of your current medicines to your exam (including vitamins, minerals and over-the-counter drugs).  You may or may not receive intravenous (IV) contrast for this exam pending the discretion of the Radiologist.  You do not need to do anything special to prepare.  The MRI machine uses a strong magnet. Please wear clothes without metal (snaps, zippers). A sweatsuit works well, or we may give you a hospital gown.  Please remove any body piercings and hair extensions before you arrive. You will also remove watches, jewelry, hairpins, wallets, dentures, partial dental plates and hearing aids. You may wear contact lenses, and you may be able to wear your rings. We have a safe place to keep your personal items, but it is safer to leave them at home.  **IMPORTANT** THE INSTRUCTIONS BELOW ARE ONLY FOR THOSE PATIENTS WHO HAVE BEEN PRESCRIBED SEDATION OR GENERAL ANESTHESIA DURING THEIR MRI PROCEDURE:  IF YOUR DOCTOR PRESCRIBED ORAL SEDATION (take medicine to help you relax during your exam):   You must get the medicine from your doctor (oral medication) before you arrive. Bring the medicine to the exam. Do not take it at home. You ll be told when to take it upon arriving for your exam.   Arrive one hour early. Bring someone who can take you home after the test. Your medicine will make you sleepy. After the  exam, you may not drive, take a bus or take a taxi by yourself.  IF YOUR DOCTOR PRESCRIBED IV SEDATION:   Arrive one hour early. Bring someone who can take you home after the test. Your medicine will make you sleepy. After the exam, you may not drive, take a bus or take a taxi by yourself.   No eating 6 hours before your exam. You may have clear liquids up until 4 hours before your exam. (Clear liquids include water, clear tea, black coffee and fruit juice without pulp.)  IF YOUR DOCTOR PRESCRIBED ANESTHESIA (be asleep for your exam):   Arrive 1 1/2 hours early. Bring someone who can take you home after the test. You may not drive, take a bus or take a taxi by yourself.   No eating 8 hours before your exam. You may have clear liquids up until 4 hours before your exam. (Clear liquids include water, clear tea, black coffee and fruit juice without pulp.)   You will spend four to five hours in the recovery room.  Please call the Imaging Department at your exam site with any questions.            Jul 25, 2018  3:00 PM CDT   MR NECK W CONTRAST ANGIOGRAM with UUMR2   [facility] [place], [facility] ([facility], [facility])    [address] [facility] [address] [phone]           Take your medicines as usual, unless your doctor tells you not to. Bring a list of your current medicines to your exam (including vitamins, minerals and over-the-counter drugs).  You may or may not receive intravenous (IV) contrast for this exam pending the discretion of the Radiologist.  You do not need to do anything special to prepare.  The MRI machine uses a strong magnet. Please wear clothes without metal (snaps, zippers). A sweatsuit works well, or we may give you a hospital gown.  Please remove any body piercings and hair extensions before you arrive. You will also remove watches, jewelry, hairpins, wallets, dentures, partial dental plates and hearing aids. You may wear contact lenses, and you  may be able to wear your rings. We have a safe place to keep your personal items, but it is safer to leave them at home.  **IMPORTANT** THE INSTRUCTIONS BELOW ARE ONLY FOR THOSE PATIENTS WHO HAVE BEEN PRESCRIBED SEDATION OR GENERAL ANESTHESIA DURING THEIR MRI PROCEDURE:  IF YOUR DOCTOR PRESCRIBED ORAL SEDATION (take medicine to help you relax during your exam):   You must get the medicine from your doctor (oral medication) before you arrive. Bring the medicine to the exam. Do not take it at home. You ll be told when to take it upon arriving for your exam.   Arrive one hour early. Bring someone who can take you home after the test. Your medicine will make you sleepy. After the exam, you may not drive, take a bus or take a taxi by yourself.  IF YOUR DOCTOR PRESCRIBED IV SEDATION:   Arrive one hour early. Bring someone who can take you home after the test. Your medicine will make you sleepy. After the exam, you may not drive, take a bus or take a taxi by yourself.   No eating 6 hours before your exam. You may have clear liquids up until 4 hours before your exam. (Clear liquids include water, clear tea, black coffee and fruit juice without pulp.)  IF YOUR DOCTOR PRESCRIBED ANESTHESIA (be asleep for your exam):   Arrive 1 1/2 hours early. Bring someone who can take you home after the test. You may not drive, take a bus or take a taxi by yourself.   No eating 8 hours before your exam. You may have clear liquids up until 4 hours before your exam. (Clear liquids include water, clear tea, black coffee and fruit juice without pulp.)   You will spend four to five hours in the recovery room.  Please call the Imaging Department at your exam site with any questions.            Aug 08, 2018  2:15 PM CDT   (Arrive by 2:00 PM)   Return Visit with Wade Singh MD   [facility] ([facility])    [address] [address]    [phone]            Aug 08, 2018  4:20 PM CDT   (Arrive by 4:05 PM)   Return Visit with Juana Griffith MD   [facility] ([facility])    [address] [place] [phone]   [phone]            Aug 13, 2018  1:40 PM CDT   (Arrive by 1:25 PM)   NEW THROAT with Seema Eubanks MD   [facility] ([facility])    [address] [place] [id number] [phone]           This is a multi-disciplinary care team visit as patients with your type of problem are usually seen by a team of an MD and a Speech-Language Pathologist (who is a specialist in disorders of the voice, throat, and breathing).  Please plan about 2 hours for your visit, which will likely include Laryngeal Function Studies, a Voice/Swallow/Breathing Evaluation, and an Endoscopic Laryngeal Examination to provide a comprehensive evaluation.  Please check with your insurance company to ensure you are covered for these services. - It is important to know that if you are evaluated and/or treated by both a physician and a speech pathologist during your visit, your billing will reflect the input that you receive from both providers as separate professionals. Although most insurance plans do cover these services, we encourage you to contact your insurance company prior to your visit to determine whether there are any coverage limitations that might affect you financially. - Billing/procedure codes that are frequently associated with visits to our clinic include (but are not limited to) the ones listed below. Most patients will not need all of these items, but it may be useful to ask your insurance company's patient . 25334: Flexible fiberoptic laryngoscopy, 93908: Laryngoscopy; flexible or rigid fiberoptic, with stroboscopy, 38085: Flexible endoscopic evaluation of swallowing, 55556:  Laryngeal function aerodynamic evaluation, 55761: Evaluation of Voice and Resonance, 68168: Speech pathology treatment for voice, speech, communication, 20212: Speech pathology treatment for swallowing/oral function for feeding - If you have any concerns or questions, or if you would prefer not to have a speech pathologist involved in your visit, please contact our Clinic Coordinator at [phone], at least 24 hours prior to your appointment.            Aug 13, 2018  1:40 PM CDT   (Arrive by 1:25 PM)   [facility] Speech Pathology and ENT Evaluation with  Ent Dysphonia Slp Provider   [facility] Voice ([facility])    [address] [place] [address] [phone]            Aug 15, 2018  2:45 PM CDT   (Arrive by 2:30 PM)   Return Visit with Zoe Barahona MD   [facility] ([facility])    [address] [place] [address] [phone]              Future tests that were ordered for you today     Open Future Orders        Priority Expected Expires Ordered    MRI Brain w & w/o contrast Routine  7/17/2019 7/17/2018    MRI Angiogram head w & w/o contrast Routine  7/17/2019 7/17/2018    MRI Angiogram neck w contrast Routine  7/17/2019 7/17/2018            Who to contact     If you have questions or need follow up information about today's clinic visit or your schedule please contact [facility] directly at [phone].  Normal or non-critical lab and imaging results will be communicated to you by MyChart, letter or phone within 4 business days after the clinic has received the results. If you do not hear from us within 7 days, please contact the clinic through [employer] or phone. If you have a critical or abnormal lab result, we will notify you by phone as soon as possible.  Submit refill requests through [email or web address] or call your pharmacy and they will forward the refill request to  "us. Please allow 3 business days for your refill to be completed.          Additional Information About Your Visit        [employer] Information     [employer] gives you secure access to your electronic health record. If you see a primary care provider, you can also send messages to your care team and make appointments. If you have questions, please call your primary care clinic.  If you do not have a primary care provider, please call [phone] and they will assist you.        Care EveryWhere ID     This is your Care EveryWhere ID. This could be used by other organizations to access your [place] medical records  [id number]        Your Vitals Were     Pulse Temperature Respirations Height Pulse Oximetry BMI (Body Mass Index)    67 97.9  F (36.6  C) (Oral) 12 1.689 m (5' 6.5\") 98% 32.27 kg/m2       Blood Pressure from Last 3 Encounters:   07/17/18 121/78   07/17/18 124/85   07/09/18 132/88    Weight from Last 3 Encounters:   07/17/18 92.1 kg (203 lb)   07/17/18 93.6 kg (206 lb 4.8 oz)   07/16/18 93 kg (205 lb)              We Performed the Following     Rheumatology IP Consult          Today's Medication Changes          These changes are accurate as of 7/17/18  1:25 PM.  If you have any questions, ask your nurse or doctor.               These medicines have changed or have updated prescriptions.        Dose/Directions    metoclopramide 5 MG tablet   Commonly known as:  REGLAN   This may have changed:  medication strength   Used for:  Intractable chronic migraine without aura and without status migrainosus   Changed by:  Noemy Nguyen MD        Dose:  5-10 mg   Take 1-2 tablets (5-10 mg) by mouth every 6 hours as needed   Quantity:  180 tablet   Refills:  1            Where to get your medicines      These medications were sent to [facility] [address] [facility] [place] - [address]  [address] [place] [address]     Phone:  [phone]     metoclopramide 5 MG tablet    OLANZapine 5 MG tablet    "             Primary Care Provider Office Phone # Fax #    Ellyn Rivera [phone] [address] [address]        Equal Access to Services     CHRISTINA GUY : Ashutosh kia gamboa demetriuso Sonegrito, warejida luqadaha, qaybta kaalmada eileen, odette chino laTanyastanley tang. So [facility] [phone].    ATENCIÓN: Si habla español, tiene a sweeney disposición servicios gratuitos de asistencia lingüística. [facility] [phone].    We comply with applicable federal civil rights laws and [place] laws. We do not discriminate on the basis of race, color, national origin, age, disability, sex, sexual orientation, or gender identity.            Thank you!     Thank you for choosing [facility]  for your care. Our goal is always to provide you with excellent care. Hearing back from our patients is one way we can continue to improve our services. Please take a few minutes to complete the written survey that you may receive in the mail after your visit with us. Thank you!             Your Updated Medication List - Protect others around you: Learn how to safely use, store and throw away your medicines at www.disposemymeds.org.          This list is accurate as of 7/17/18  1:25 PM.  Always use your most recent med list.                   Brand Name Dispense Instructions for use Diagnosis    ACETAMINOPHEN PO      Take 1,000 mg by mouth every 6 hours as needed for pain Take with tramadol        ADVAIR DISKUS 250-50 MCG/DOSE diskus inhaler   Generic drug:  fluticasone-salmeterol      Inhale 1 puff into the lungs 2 times daily as needed        albuterol 108 (90 Base) MCG/ACT Inhaler    PROAIR HFA/PROVENTIL HFA/VENTOLIN HFA     Inhale 2 puffs into the lungs every 6 hours as needed        AMITRIPTYLINE HCL PO      Take 40 mg by mouth daily for two weeks, then increase to 50 mg daily.        buprenorphine 10 MCG/HR WK patch    BUTRANS    4 patch    Place 1  patch onto the skin every 7 days    Postlaminectomy syndrome of lumbar region       butalbital-acetaminophen-caffeine -40 MG per tablet    FIORICET/ESGIC     TAKE ONE TABLET BY MOUTH EVERY 4 HOURS AS NEEDED        cetirizine 10 MG tablet    zyrTEC     Take 10 mg by mouth daily        COMPRESSION STOCKINGS     3 each    1 each daily 20-30 mmHg Thigh Length measure and fit, color and style per patient preference. Doff n jose per need.    Orthostatic hypotension       DULOXETINE HCL PO      Take 60 mg by mouth in the morning and 30 mg in the evening.        eletriptan 20 MG tablet    RELPAX    18 tablet    Take 1 tablet (20 mg) by mouth at onset of headache for migraine May repeat in 2 hours. Max 4 tablets/24 hours.    Chronic daily headache, Intractable migraine without aura and with status migrainosus       EPINEPHrine 0.3 MG/0.3ML injection 2-pack    EPIPEN/ADRENACLICK/or ANY BX GENERIC EQUIV     Inject 0.3 mg into the muscle        famotidine 20 MG tablet    PEPCID    90 tablet    Take 1 tablet (20 mg) by mouth At Bedtime    Hiatal hernia, Mast cell disorder       fludrocortisone 0.1 MG tablet    FLORINEF    180 tablet    Take 2 tablets (0.2 mg) by mouth daily    Autonomic dysfunction, Pre-syncope       fluticasone 50 MCG/ACT spray    FLONASE    16 mL    INSTILL 2 SPRAYS INTO BOTH NOSTRILS DAILY    Chronic rhinitis, unspecified type       linaclotide 145 MCG capsule    LINZESS    30 capsule    Take 1 capsule (145 mcg) by mouth every morning (before breakfast) Last refill until GI clinic follow up.    Chronic idiopathic constipation       medical cannabis (Patient's own supply.  Not a prescription)      Take 1 Dose by mouth See Admin Instructions (This is NOT a prescription, and does not certify that the patient has a qualifying medical condition for medical cannabis.  The purpose of this order is  to document that the patient reports taking medical cannabis.)        metoclopramide 5 MG tablet    REGLAN     180 tablet    Take 1-2 tablets (5-10 mg) by mouth every 6 hours as needed    Intractable chronic migraine without aura and without status migrainosus       MULTIVITAMIN PO      Take 1 tablet by mouth daily        OLANZapine 5 MG tablet    zyPREXA    30 tablet    Take 1 tablet (5 mg) by mouth daily as needed (headache pain, nausea)    Other migraine with status migrainosus, intractable, Intractable chronic migraine without aura and without status migrainosus       omeprazole 20 MG CR capsule    priLOSEC    90 capsule    Take 1 capsule (20 mg) by mouth daily    Hiatal hernia       ondansetron 4 MG ODT tab    ZOFRAN-ODT    60 tablet    Take 1-2 tablets (4-8 mg) by mouth every 12 hours as needed for nausea    Migraine without status migrainosus, not intractable, unspecified migraine type       oxyCODONE IR 5 MG tablet    ROXICODONE    15 tablet    Take 0.5 tablets (2.5 mg) by mouth every 6 hours as needed for severe pain    Postlaminectomy syndrome       PROBIOTIC DAILY PO      Take 2 packets by mouth every morning        propranolol 80 MG 24 hr capsule    INDERAL LA          Propranolol HCl SR Beads 80 MG Cp24     90 capsule    Take 80 mg by mouth daily    Sinus tachycardia       rizatriptan 5 MG ODT tab    MAXALT-MLT     TAKE 1-2 TABLETS (5-10 MG) BY MOUTH AT ONSET OF HEADACHE FOR MIGRAINE (MAX 30 MG IN 24 HOURS)        SUMAtriptan 6 MG/0.5ML pen injector kit    IMITREX STATDOSE    2 kit    Inject 0.5 mLs (6 mg) Subcutaneous at onset of headache for migraine (may repeat once after 2 hrs) May repeat in 1 hour. Max 12 mg/24 hours.    Chronic migraine without aura without status migrainosus, not intractable       SYNTHROID 25 MCG tablet   Generic drug:  levothyroxine      Take 25 mcg by mouth daily        tiZANidine 4 MG tablet    ZANAFLEX    120 tablet    Take 1 tablet (4 mg) by mouth 4 times daily as needed for muscle spasms        traMADol 50 MG tablet    ULTRAM    60 tablet    Take 1 tablet (50 mg) by mouth every 6  hours as needed for severe pain (Take scheduled every 6 hours for one week, then resume taking as needed)    Postlaminectomy syndrome       VITAMIN D (CHOLECALCIFEROL) PO      Take 2,000 Units by mouth daily        ZONISAMIDE PO      Take 75 mg by mouth daily

## 2018-07-17 NOTE — TELEPHONE ENCOUNTER
FUTURE VISIT INFORMATION      FUTURE VISIT INFORMATION:    Date: 7.23.18    Time: 12:15 Pm    Location: [facility]  REFERRAL INFORMATION:    Referring provider:  Dr. Rivera    Referring providers clinic:  [facility]    Reason for visit/diagnosis  Allergies    RECORDS REQUESTED FROM:       Clinic name Comments Records Status Imaging Status   [facility] Referral placed on 4.19.18                                     RECORDS STATUS

## 2018-07-17 NOTE — LETTER
7/17/2018       RE: Ada Welch  [address] [place] [address]     Dear Colleague,    Thank you for referring your patient, Ada Welch, to the [facility] at [facility]. Please see a copy of my visit note below.    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    She was admitted to the hospital 7/6/18-7/9/18 for an intractable migraine. Prior to admission she had been fighting an intractable headache or at least 3-4 weeks. She had severe headache, light and sound sensitivity and nausea. She also reports diplopia and slurred speech. She went to hospital wanting the DHE protocol. Migraine did not break but she was consuming oral intake adequately. She had found Zyprexa to be of benefit and she was discharged home.    She reports continued migraine pain. She also reports improved but not resolved diplopia. Pain in the base of her skull and neck and she is wearing a hard collar to help with the pain, previously advised by Dr Hernandez.    She had a had CT while in the hospital. At the beginning of June she had MRI brain and CSF flow studies. The brain was normal there was some crowding in the posterior fossa and reduced flow in the cerebral aqueduct but no hydrocephalus. No MRI was repeated in hospital.    The plan is to commence Aimovig. We are awaiting the medication delivery.    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     Chronic sinusitis 00/2007    Diagnosed with chronic pansinusitis 2016     Chronic  tonsillitis 1980    Had tonsils removed 02/1981, rheumatic fever     Tom-Danlos disease      Gastroesophageal reflux disease 3/1/2017    I have large hiatal hernia     Hernia, abdominal 10/2016    Hiatal Hernia     Hiatal hernia 2016    Have adeline hiatel hernia     Hoarseness Unsure    It comes and goes     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     Migraines 1/1/2007    Unsure of exact date     Nasal polyps 2013    Were revoved 03/2017, benign     Other nervous system complications 1/2014    Autonomic nervous system disorder, neuralgia, neuropathy     Pneumonia Unsure    Have had pneumonia several times     Swelling, mass, or lump in head and neck 08/2016    Lymph node has been growing for last year     Thyroid disease 01/01/2008     Patient Active Problem List   Diagnosis     Pain syndrome, chronic     s/p TLIF L4-5,L5-S1 with solid arthrodesis     Tom-Danlos syndrome     Postlaminectomy syndrome, lumbar region     Headache     Cervicalgia     Slow transit constipation     Urinary urgency     Nocturia     Cardiac device in situ- Medtronic Implanted Loop Recorder (ILR)     Acquired hypothyroidism     Allergic rhinitis     Anxiety     Anxiety disorder     Astigmatism     Chronic sinusitis with recurrent bronchitis     Arthritis of facet joints at multiple vertebral levels (H)     Arthritis involving multiple sites     Opioid dependence (H)     Cyst of fallopian tube     Depression     DNS (deviated nasal septum)     Dysuria     Easy bruising     Encounter for genetic counseling and testing     Essential hypertension with goal blood pressure less than 140/90     Head and neck lymphadenopathy     Hepatomegaly     Keratoconjunctivitis sicca (H)     Low back pain radiating to both legs     Liver lesion     Low back pain     Low back pain of multiple sites of spine with sciatica     Lumbar degenerative disc disease     Lumbar radiculopathy     Lymphadenopathy of left cervical region      Intractable chronic migraine without aura and without status migrainosus     Nasal turbinate hypertrophy     Non compliance with medical treatment     Obese     Opioid type dependence, continuous (H)     Other acute postoperative pain     Pain disorder associated with psychological and physical factors     Presbyopia     Spondylolisthesis of lumbar region     Sinus tachycardia     Uterine endometriosis     Autonomic dysfunction     Migraine     Past Surgical History:   Procedure Laterality Date     ADENOIDECTOMY  1981     AS REPAIR OF NASAL SEPTUM       BACK SURGERY  12/09/2013  10/01/2014    Spinal fusion L4-S1, L5 moving 5/8ths in. Remove screws/rods     BIOPSY  01/01/2008 & 3/2017    mole biopsy, lymph node biopsy     COLONOSCOPY  3/2017    Colonoscopy and GI     NASAL/SINUS POLYPECTOMY  2017    Polyps were benign     TONSILLECTOMY       TONSILLECTOMY  1981     TUBAL LIGATION       Family History   Problem Relation Age of Onset     Connective Tissue Disorder Mother      eds     Connective Tissue Disorder Sister      eds     Cancer Father      Spinal tumor grew into spinal cord, went in brain     Migraines Father      Diabetes Maternal Grandmother      Elderly diabetes     HEART DISEASE Maternal Grandmother      Hypertension Maternal Grandmother      Diabetes Paternal Grandmother      Elderly diabetes     Diabetes Paternal Grandfather      Elderly diabetes     Asthma Sister      Pediatric Asthma     Migraines Sister      Asthma Son      Pediatric Asthma     Thyroid Disease Sister      ,     Migraines Sister      Migraines Sister      Breast Cancer No family hx of      Social History     Social History     Marital status:      Spouse name: Carry     Number of children: 5     Years of education: N/A     Occupational History     Not on file.     Social History Main Topics     Smoking status: Former Smoker     Packs/day: 0.20     Years: 10.00     Types: Cigarettes     Start date: 1/1/1991     Quit date:  "12/1/2013     Smokeless tobacco: Never Used      Comment: I somked on and off during specified dates.     Alcohol use Yes      Comment: Less than 5 drinks per year     Drug use: No     Sexual activity: Yes     Partners: Male     Birth control/ protection: Female Surgical     Other Topics Concern     Parent/Sibling W/ Cabg, Mi Or Angioplasty Before 65f 55m? No     Social History Narrative    5 kids: 24, 23, 19, 13, 5 yo     works at [employer] for disability         The patient reports she received approval for disability this past week.    ROS:     Physical Examination:  /85 (BP Location: Left arm, Patient Position: Chair, Cuff Size: Adult Regular)  Pulse 71  Ht 1.676 m (5' 6\")  Wt 93.6 kg (206 lb 4.8 oz)  BMI 33.3 kg/m2  Gen: awake, alert, NAD  Neck; wearing hard collar  Neuro: No aphasia or dysarthria. Attention and recall intact. CN 2-12 intact with no nystagmus. Moving all four extremities with good coordination and strength. Normal muscle bulk, tone in all four ext. Gait is stable. No tremor.  CV: RRR  Chest: CTA B  Ext: No edema      Impression/Recommendations:  1. Chronic migraine with intractable migraine  Awaiting Aimovig.  Honest discussion with patient that her chronic use of narcotics likely makes headaches worse. She may need to choose which pain is the worst.  As long as she is eating and drinking she does not need hospitalization for headache. DHE won't stop this headache.  Will ask Dr lindsay to consider trigger point injections.    2. Neck pain  Use collar as instructed by neurosurgery. She shouldn't use too much otherwise her neck will become weaker and less stable.    3. Diplopia, slurrred speech  Likely due to migraine but will recheck MRI brain and MR angio of head and neck to rule out stroke and dissection. I will call with results.    40 minutes with the patient over 50% counseling.    Again, thank you for allowing me to participate in the care of your patient.  "     Sincerely,    Noemy Nguyen MD

## 2018-07-17 NOTE — PATIENT INSTRUCTIONS
1.  Rheumatology referral.  [facility]   [address], [address]  [address] [place] [address]   Appointments: [phone], option 2    2.  Call Ana Rosa with surgery dates & details.  We will come up with anticoagulation plan.  3.  See back in 1 year.

## 2018-07-18 PROCEDURE — 00000346 ZZHCL STATISTIC REVIEW OUTSIDE SLIDES TC 88321: Performed by: OTOLARYNGOLOGY

## 2018-07-18 NOTE — PROGRESS NOTES
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    She was admitted to the hospital 7/6/18-7/9/18 for an intractable migraine. Prior to admission she had been fighting an intractable headache or at least 3-4 weeks. She had severe headache, light and sound sensitivity and nausea. She also reports diplopia and slurred speech. She went to hospital wanting the DHE protocol. Migraine did not break but she was consuming oral intake adequately. She had found Zyprexa to be of benefit and she was discharged home.    She reports continued migraine pain. She also reports improved but not resolved diplopia. Pain in the base of her skull and neck and she is wearing a hard collar to help with the pain, previously advised by Dr Hernandez.    She had a had CT while in the hospital. At the beginning of June she had MRI brain and CSF flow studies. The brain was normal there was some crowding in the posterior fossa and reduced flow in the cerebral aqueduct but no hydrocephalus. No MRI was repeated in hospital.    The plan is to commence Aimovig. We are awaiting the medication delivery.    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     Chronic sinusitis 00/2007    Diagnosed with chronic pansinusitis 2016     Chronic tonsillitis 1980    Had tonsils removed 02/1981, rheumatic fever     Tom-Danlos disease      Gastroesophageal reflux disease 3/1/2017    I have large hiatal hernia     Hernia, abdominal 10/2016    Hiatal Hernia     Hiatal hernia 2016    Have adeline hiatel hernia     Hoarseness Unsure    It  comes and goes     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     Migraines 1/1/2007    Unsure of exact date     Nasal polyps 2013    Were revoved 03/2017, benign     Other nervous system complications 1/2014    Autonomic nervous system disorder, neuralgia, neuropathy     Pneumonia Unsure    Have had pneumonia several times     Swelling, mass, or lump in head and neck 08/2016    Lymph node has been growing for last year     Thyroid disease 01/01/2008     Patient Active Problem List   Diagnosis     Pain syndrome, chronic     s/p TLIF L4-5,L5-S1 with solid arthrodesis     Tom-Danlos syndrome     Postlaminectomy syndrome, lumbar region     Headache     Cervicalgia     Slow transit constipation     Urinary urgency     Nocturia     Cardiac device in situ- Medtr[employer] Implanted Loop Recorder (ILR)     Acquired hypothyroidism     Allergic rhinitis     Anxiety     Anxiety disorder     Astigmatism     Chronic sinusitis with recurrent bronchitis     Arthritis of facet joints at multiple vertebral levels (H)     Arthritis involving multiple sites     Opioid dependence (H)     Cyst of fallopian tube     Depression     DNS (deviated nasal septum)     Dysuria     Easy bruising     Encounter for genetic counseling and testing     Essential hypertension with goal blood pressure less than 140/90     Head and neck lymphadenopathy     Hepatomegaly     Keratoconjunctivitis sicca (H)     Low back pain radiating to both legs     Liver lesion     Low back pain     Low back pain of multiple sites of spine with sciatica     Lumbar degenerative disc disease     Lumbar radiculopathy     Lymphadenopathy of left cervical region     Intractable chronic migraine without aura and without status migrainosus     Nasal turbinate hypertrophy     Non compliance with medical treatment     Obese     Opioid type dependence, continuous (H)     Other acute postoperative pain     Pain disorder associated with psychological and  physical factors     Presbyopia     Spondylolisthesis of lumbar region     Sinus tachycardia     Uterine endometriosis     Autonomic dysfunction     Migraine     Past Surgical History:   Procedure Laterality Date     ADENOIDECTOMY  1981     AS REPAIR OF NASAL SEPTUM       BACK SURGERY  12/09/2013  10/01/2014    Spinal fusion L4-S1, L5 moving 5/8ths in. Remove screws/rods     BIOPSY  01/01/2008 & 3/2017    mole biopsy, lymph node biopsy     COLONOSCOPY  3/2017    Colonoscopy and GI     NASAL/SINUS POLYPECTOMY  2017    Polyps were benign     TONSILLECTOMY       TONSILLECTOMY  1981     TUBAL LIGATION       Family History   Problem Relation Age of Onset     Connective Tissue Disorder Mother      eds     Connective Tissue Disorder Sister      eds     Cancer Father      Spinal tumor grew into spinal cord, went in brain     Migraines Father      Diabetes Maternal Grandmother      Elderly diabetes     HEART DISEASE Maternal Grandmother      Hypertension Maternal Grandmother      Diabetes Paternal Grandmother      Elderly diabetes     Diabetes Paternal Grandfather      Elderly diabetes     Asthma Sister      Pediatric Asthma     Migraines Sister      Asthma Son      Pediatric Asthma     Thyroid Disease Sister      ,     Migraines Sister      Migraines Sister      Breast Cancer No family hx of      Social History     Social History     Marital status:      Spouse name: Carry     Number of children: 5     Years of education: N/A     Occupational History     Not on file.     Social History Main Topics     Smoking status: Former Smoker     Packs/day: 0.20     Years: 10.00     Types: Cigarettes     Start date: 1/1/1991     Quit date: 12/1/2013     Smokeless tobacco: Never Used      Comment: I somked on and off during specified dates.     Alcohol use Yes      Comment: Less than 5 drinks per year     Drug use: No     Sexual activity: Yes     Partners: Male     Birth control/ protection: Female Surgical     Other Topics  Concern     Parent/Sibling W/ Cabg, Mi Or Angioplasty Before 65f 55m? No     Social History Narrative    5 kids: 24, 23, 19, 13, 7 yo     works at [employer] for disability         The patient reports she received approval for disability this past week.    ROS: Answers for HPI/ROS submitted by the patient on 7/10/2018   General Symptoms: Yes  Skin Symptoms: Yes  HENT Symptoms: Yes  EYE SYMPTOMS: Yes  HEART SYMPTOMS: Yes  LUNG SYMPTOMS: Yes  INTESTINAL SYMPTOMS: Yes  URINARY SYMPTOMS: Yes  GYNECOLOGIC SYMPTOMS: Yes  BREAST SYMPTOMS: No  SKELETAL SYMPTOMS: Yes  BLOOD SYMPTOMS: Yes  NERVOUS SYSTEM SYMPTOMS: Yes  MENTAL HEALTH SYMPTOMS: No  Fever: Yes  Loss of appetite: Yes  Weight loss: No  Weight gain: Yes  Fatigue: Yes  Night sweats: Yes  Chills: Yes  Increased stress: No  Excessive hunger: No  Excessive thirst: Yes  Feeling hot or cold when others believe the temperature is normal: Yes  Loss of height: No  Post-operative complications: Yes  Surgical site pain: Yes  Hallucinations: No  Change in or Loss of Energy: Yes  Hyperactivity: No  Confusion: Yes  Changes in hair: Yes  Changes in moles/birth marks: Yes  Itching: Yes  Rashes: Yes  Changes in nails: Yes  Acne: No  Hair in places you don't want it: No  Change in facial hair: No  Warts: No  Non-healing sores: Yes  Scarring: Yes  Flaking of skin: No  Color changes of hands/feet in cold : Yes  Sun sensitivity: Yes  Skin thickening: No  Ear pain: Yes  Ear discharge: No  Hearing loss: Yes  Tinnitus: Yes  Nosebleeds: Yes  Congestion: Yes  Sinus pain: Yes  Trouble swallowing: Yes   Voice hoarseness: Yes  Mouth sores: No  Sore throat: Yes  Tooth pain: No  Gum tenderness: Yes  Bleeding gums: No  Change in taste: Yes  Change in sense of smell: Yes  Dry mouth: Yes  Hearing aid used: No  Neck lump: Yes  Eye pain: Yes  Vision loss: Yes  Dry eyes: Yes  Watery eyes: Yes  Eye bulging: No  Double vision: Yes  Flashing of lights: Yes  Spots: Yes  Floaters:  Yes  Redness: Yes  Crossed eyes: No  Tunnel Vision: Yes  Yellowing of eyes: No  Eye irritation: Yes  Cough: Yes  Sputum or phlegm: Yes  Coughing up blood: Yes  Difficulty breating or shortness of breath: Yes  Snoring: Yes  Wheezing: Yes  Difficulty breathing on exertion: Yes  Nighttime Cough: Yes  Difficulty breathing when lying flat: Yes  Chest pain or pressure: Yes  Fast or irregular heartbeat: Yes  Pain in legs with walking: Yes  Trouble breathing while lying down: Yes  Fingers or toes appear blue: Yes  High blood pressure: Yes  Low blood pressure: Yes  Fainting: Yes  Murmurs: No  Pacemaker: No  Varicose veins: No  Edema or swelling: Yes  Wake up at night with shortness of breath: Yes  Light-headedness: Yes  Exercise intolerance: Yes  Heart burn or indigestion: Yes  Nausea: Yes  Vomiting: Yes  Abdominal pain: Yes  Bloating: Yes  Constipation: Yes  Diarrhea: Yes  Blood in stool: No  Black stools: No  Rectal or Anal pain: No  Fecal incontinence: No  Yellowing of skin or eyes: No  Vomit with blood: Yes  Change in stools: Yes  Trouble holding urine or incontinence: Yes  Pain or burning: Yes  Trouble starting or stopping: Yes  Increased frequency of urination: Yes  Blood in urine: No  Decreased frequency of urination: No  Frequent nighttime urination: Yes  Flank pain: Yes  Difficulty emptying bladder: Yes  Back pain: Yes  Muscle aches: Yes  Neck pain: Yes  Swollen joints: Yes  Joint pain: Yes  Bone pain: Yes  Muscle cramps: Yes  Muscle weakness: Yes  Joint stiffness: Yes  Bone fracture: No  Anemia: No  Swollen glands: Yes  Easy bleeding or bruising: Yes  Trouble with coordination: Yes  Dizziness or trouble with balance: Yes  Fainting or black-out spells: Yes  Memory loss: Yes  Headache: Yes  Seizures: No  Speech problems: Yes  Tingling: Yes  Tremor: Yes  Weakness: Yes  Difficulty walking: Yes  Paralysis: No  Numbness: Yes  Bleeding or spotting between periods: No  Heavy or painful periods: Yes  Irregular periods:  "No  Vaginal discharge: No  Hot flashes: No  Vaginal dryness: Yes  Genital ulcers: No  Reduced libido: No  Painful intercourse: No  Difficulty with sexual arousal: No  Post-menopausal bleeding: No    Physical Examination:  /85 (BP Location: Left arm, Patient Position: Chair, Cuff Size: Adult Regular)  Pulse 71  Ht 1.676 m (5' 6\")  Wt 93.6 kg (206 lb 4.8 oz)  BMI 33.3 kg/m2  Gen: awake, alert, NAD  Neck; wearing hard collar  Neuro: No aphasia or dysarthria. Attention and recall intact. CN 2-12 intact with no nystagmus. Moving all four extremities with good coordination and strength. Normal muscle bulk, tone in all four ext. Gait is stable. No tremor.  CV: RRR  Chest: CTA B  Ext: No edema      Impression/Recommendations:  1. Chronic migraine with intractable migraine  Awaiting Aimovig.  Honest discussion with patient that her chronic use of narcotics likely makes headaches worse. She may need to choose which pain is the worst.  As long as she is eating and drinking she does not need hospitalization for headache. DHE won't stop this headache.  Will ask Dr lindsay to consider trigger point injections.    2. Neck pain  Use collar as instructed by neurosurgery. She shouldn't use too much otherwise her neck will become weaker and less stable.    3. Diplopia, slurrred speech  Likely due to migraine but will recheck MRI brain and MR angio of head and neck to rule out stroke and dissection. I will call with results.    40 minutes with the patient over 50% counseling.    Noemy Nguyen MD FAAN  Department of Neurology    "

## 2018-07-19 DIAGNOSIS — J31.0 CHRONIC RHINITIS, UNSPECIFIED TYPE: ICD-10-CM

## 2018-07-19 LAB — COPATH REPORT: NORMAL

## 2018-07-21 RX ORDER — FLUTICASONE PROPIONATE 50 MCG
SPRAY, SUSPENSION (ML) NASAL
Qty: 48 ML | Refills: 3 | Status: SHIPPED | OUTPATIENT
Start: 2018-07-21 | End: 2020-01-28

## 2018-07-23 ENCOUNTER — PRE VISIT (OUTPATIENT)
Dept: PULMONOLOGY | Facility: CLINIC | Age: 48
End: 2018-07-23

## 2018-07-23 ENCOUNTER — OFFICE VISIT (OUTPATIENT)
Dept: INTERNAL MEDICINE | Facility: CLINIC | Age: 48
End: 2018-07-23
Payer: MEDICARE

## 2018-07-23 ENCOUNTER — HOSPITAL ENCOUNTER (INPATIENT)
Facility: CLINIC | Age: 48
LOS: 1 days | Discharge: HOME | End: 2018-07-25
Attending: EMERGENCY MEDICINE | Admitting: PSYCHIATRY & NEUROLOGY
Payer: COMMERCIAL

## 2018-07-23 VITALS — DIASTOLIC BLOOD PRESSURE: 78 MMHG | HEART RATE: 59 BPM | SYSTOLIC BLOOD PRESSURE: 115 MMHG

## 2018-07-23 DIAGNOSIS — G43.811 OTHER MIGRAINE WITH STATUS MIGRAINOSUS, INTRACTABLE: ICD-10-CM

## 2018-07-23 DIAGNOSIS — K59.04 CHRONIC IDIOPATHIC CONSTIPATION: ICD-10-CM

## 2018-07-23 DIAGNOSIS — G43.911: ICD-10-CM

## 2018-07-23 DIAGNOSIS — R13.19 ESOPHAGEAL DYSPHAGIA: Primary | ICD-10-CM

## 2018-07-23 PROCEDURE — 99285 EMERGENCY DEPT VISIT HI MDM: CPT | Mod: 25 | Performed by: EMERGENCY MEDICINE

## 2018-07-23 PROCEDURE — 96361 HYDRATE IV INFUSION ADD-ON: CPT | Performed by: EMERGENCY MEDICINE

## 2018-07-23 PROCEDURE — 25000128 H RX IP 250 OP 636: Performed by: EMERGENCY MEDICINE

## 2018-07-23 PROCEDURE — 99285 EMERGENCY DEPT VISIT HI MDM: CPT | Mod: Z6 | Performed by: EMERGENCY MEDICINE

## 2018-07-23 PROCEDURE — 96375 TX/PRO/DX INJ NEW DRUG ADDON: CPT | Performed by: EMERGENCY MEDICINE

## 2018-07-23 PROCEDURE — 96374 THER/PROPH/DIAG INJ IV PUSH: CPT | Performed by: EMERGENCY MEDICINE

## 2018-07-23 RX ORDER — DIPHENHYDRAMINE HYDROCHLORIDE 50 MG/ML
50 INJECTION INTRAMUSCULAR; INTRAVENOUS ONCE
Status: COMPLETED | OUTPATIENT
Start: 2018-07-23 | End: 2018-07-23

## 2018-07-23 RX ORDER — METOCLOPRAMIDE HYDROCHLORIDE 5 MG/ML
10 INJECTION INTRAMUSCULAR; INTRAVENOUS ONCE
Status: COMPLETED | OUTPATIENT
Start: 2018-07-23 | End: 2018-07-23

## 2018-07-23 RX ADMIN — METOCLOPRAMIDE 10 MG: 5 INJECTION, SOLUTION INTRAMUSCULAR; INTRAVENOUS at 23:33

## 2018-07-23 RX ADMIN — SODIUM CHLORIDE 1000 ML: 9 INJECTION, SOLUTION INTRAVENOUS at 23:35

## 2018-07-23 RX ADMIN — DIPHENHYDRAMINE HYDROCHLORIDE 50 MG: 50 INJECTION, SOLUTION INTRAMUSCULAR; INTRAVENOUS at 23:35

## 2018-07-23 ASSESSMENT — ENCOUNTER SYMPTOMS: HEADACHES: 1

## 2018-07-23 ASSESSMENT — PAIN SCALES - GENERAL: PAINLEVEL: SEVERE PAIN (7)

## 2018-07-23 NOTE — IP AVS SNAPSHOT
` ` Patient Information     Patient Name Sex     Ada Welch ([id number]) Female 1970       Room Bed    [facility] 62-      Patient Demographics     Address Phone E-mail Address    [address] [place] [address] [address]OV[address] MENDEZ [id number] [phone] (Home)  [phone] (Mobile) *Preferred* [email or web address]      Patient Ethnicity & Race     Ethnic Group Patient Race    American White      Emergency Contact(s)     Name Relation Home Work Mobile    Flakita Welch Spouse [phone]        Documents on File        Status Date Received Description       Documents for the Patient    Consent for Services - Hospital/Clinic Received () 11/12/15     Consent for EHR Access Received 11/12/15     Privacy Notice - Arverne Received 11/12/15     Insurance Card Received 18 [place] [id number]    External Medication Information Consent       Patient ID Received 17 ON FILE    [facility] Specified Other       Consent for Services - Hospital/Clinic  11/12/15 CONSENT FOR SERVICE    Consent for EHR Access  11/12/15 CONSENT FOR USE OF [facility] EHR WITH NON[facility] PROVIDERS    Privacy Notice - Arverne  11/12/15 ACKNOWLEDGMENT OF RECEIPT OF NOTICE OF PRIVACY PRACTICE    HIM JORGE Authorization - File Only  11/13/15 [facility] TO , 11/12/15    HIM JORGE Authorization  12/18/15 [facility] and rheumatology    HIM JORGE Authorization  16 DDS    Consent for Services - [facility]       Consent for Services/Privacy Notice - Hospital/Clinic-Esign Received () 16     HIM JORGE Authorization - File Only  16 [facility]    Patient Entered Drawing       Patient Entered Drawing       HIM JORGE Authorization - File Only  16 [facility]    HIM JORGE Authorization - File Only  16 [facility]    Patient Entered Drawing       Patient Entered Drawing       Patient Entered Drawing       Patient Entered Drawing       Patient Entered  Drawing       Patient Entered Drawing       HIM JORGE Authorization  16 [facility],/[facility]    Consent for Services/Privacy Notice - Hospital/Clinic       Business/Insurance/Care Coordination/Health Form - Patient  17 PHYSICAL MEDICAL SOURCE STATEMENT 16    Insurance Card Received () 17 Blue Cross Blue Shield    Patient Entered Drawing       HIM JORGE Authorization  17 [facility],/ [facility]    Business/Insurance/Care Coordination/Health Form - Patient   BLUE CROSS BLUE SHIELD - BOTOX - 17 - 3/6/18    Consent for Services/Privacy Notice - Hospital/Clinic Received 17     Patient Entered Drawing       External Medication Information Consent Accepted 17     HIM JORGE Authorization - File Only  10/03/17 [facility]    Business/Insurance/Care Coordination/Health Form - Patient   BLUE CROSS BLUE SHIELD - BOTOX - 17 - 3/6/18    Care Everywhere Prospective Auth Received 10/20/17     HIM JORGE Authorization - File Only  18 [facility]    Consent for Services - Hospital and Clinic Received 18     HIE Auth Received 18     Patient ID Received 18 [place] EXP 2018    HIM JORGE Authorization  18 [employer] LAW OFFICE / [facility]    Patient Entered Drawing       HIM JORGE Authorization - File Only  18 [facility]    Business/Insurance/Care Coordination/Health Form - Patient  18 ENROLLMENT FORM 18 ANGEL [employer]MA[employer] Prospective Auth Received () 16 Authorization Document from Social Security Administration    System-Retrieved CE Auth Form Received () 16 External Authorization Form from Social Security Administration    CE Persistent Point of Care Auth Received 17 CE Persistent Point of Care Authorization    E-Signed CE Auth Form Received 17 CE Persistent Point of Care Auth for [facility] (E-Signed)    CE Persistent Point of Care Auth Received 17 CE Persistent Point of Care  Authorization    E-Signed CE Auth Form Received 07/24/17 CE Persistent Point of Care Auth for [facility] (E-Signed)       Documents for the Encounter    CMS IM for Patient Signature         Admission Information     Attending Provider Admitting Provider Admission Type Admission Date/Time    Jorge Pickard MD Manousakis, Georgios Emmanuel, MD Elective 07/23/18  2130    Discharge Date Hospital Service Auth/Cert Status Service Area     Neurology Incomplete [facility]    Unit Room/Bed Admission Status       UU U6A 6210/6210-02 Admission (Confirmed)       Admission     Complaint    Intractable migraine with status migrainosus      Hospital Account     Name Acct ID Class Status Primary Coverage    Ada Welch [id number] Inpatient Open [place]            Guarantor Account (for Hospital Account #[id number])     Name Relation to Pt Service Area Active? Acct Type    Ada Welch Self FCS Yes Personal/Family    Address Phone          [address] [place] [address] [phone](H)  [phone](O)              Coverage Information (for Hospital Account #[id number])     F/O Payor/Plan Precert #    [place]     Subscriber Subscriber #    Flakita Welch [id number]    Address Phone    [address] [address]  [place] [address] [phone]

## 2018-07-23 NOTE — PATIENT INSTRUCTIONS
[facility] Phone Number [phone]  [facility] Medication Refill Request Information:  * Please contact your pharmacy regarding ANY request for medication refills.  ** [facility] Prescription Fax = [phone]  * Please allow 3 business days for routine medication refills.  * Please allow 5 business days for controlled substance medication refills.     [facility] Test Result notification information:  *You will be notified with in 7-10 days of your appointment day regarding the results of your test.  If you are on MyChart you will be notified as soon as the provider has reviewed the results and signed off on them.      Gastroenterology [phone] ([facility] [place] 4th Floor S)

## 2018-07-23 NOTE — MR AVS SNAPSHOT
After Visit Summary   7/23/2018    Ada Welch    MRN: [id number]           Patient Information     Date Of Birth          1970        Visit Information        Provider Department      7/23/2018 11:25 AM Shilpa Galicia MD [facility]        Today's Diagnoses     Esophageal dysphagia    -  1      Care Instructions    Primary Care Center Phone Number [phone]  Primary Care Center Medication Refill Request Information:  * Please contact your pharmacy regarding ANY request for medication refills.  ** [facility] Prescription Fax = [phone]  * Please allow 3 business days for routine medication refills.  * Please allow 5 business days for controlled substance medication refills.     Primary Care Center Test Result notification information:  *You will be notified with in 7-10 days of your appointment day regarding the results of your test.  If you are on MyChart you will be notified as soon as the provider has reviewed the results and signed off on them.      Gastroenterology [phone] [place] [place]             Follow-ups after your visit        Additional Services     GASTROENTEROLOGY ADULT REF PROCEDURE ONLY       Last Lab Result: Creatinine (mg/dL)       Date                     Value                 07/06/2018               0.83             ----------  There is no height or weight on file to calculate BMI.     Needed:  No  Language:  English    Patient will be contacted to schedule procedure.     Please be aware that coverage of these services is subject to the terms and limitations of your health insurance plan.  Call member services at your health plan with any benefit or coverage questions.  Any procedures must be performed at a [facility] facility OR coordinated by your clinic's referral office.    Please bring the following with you to your appointment:    (1) Any X-Rays, CTs or MRIs which have been performed.  Contact the facility where they were  done to arrange for  prior to your scheduled appointment.    (2) List of current medications   (3) This referral request   (4) Any documents/labs given to you for this referral                  Your next 10 appointments already scheduled     Jul 25, 2018  1:00 PM CDT   MR BRAIN W/O & W CONTRAST with UUMR2   [facility], [place], MRI ([facility])    [address] [facility] [id number] [phone]           Take your medicines as usual, unless your doctor tells you not to. Bring a list of your current medicines to your exam (including vitamins, minerals and over-the-counter drugs).  You may or may not receive intravenous (IV) contrast for this exam pending the discretion of the Radiologist.  You do not need to do anything special to prepare.  The MRI machine uses a strong magnet. Please wear clothes without metal (snaps, zippers). A sweatsuit works well, or we may give you a hospital gown.  Please remove any body piercings and hair extensions before you arrive. You will also remove watches, jewelry, hairpins, wallets, dentures, partial dental plates and hearing aids. You may wear contact lenses, and you may be able to wear your rings. We have a safe place to keep your personal items, but it is safer to leave them at home.  **IMPORTANT** THE INSTRUCTIONS BELOW ARE ONLY FOR THOSE PATIENTS WHO HAVE BEEN PRESCRIBED SEDATION OR GENERAL ANESTHESIA DURING THEIR MRI PROCEDURE:  IF YOUR DOCTOR PRESCRIBED ORAL SEDATION (take medicine to help you relax during your exam):   You must get the medicine from your doctor (oral medication) before you arrive. Bring the medicine to the exam. Do not take it at home. You ll be told when to take it upon arriving for your exam.   Arrive one hour early. Bring someone who can take you home after the test. Your medicine will make you sleepy. After the exam, you may not drive, take a bus or take a taxi by yourself.  IF YOUR  DOCTOR PRESCRIBED IV SEDATION:   Arrive one hour early. Bring someone who can take you home after the test. Your medicine will make you sleepy. After the exam, you may not drive, take a bus or take a taxi by yourself.   No eating 6 hours before your exam. You may have clear liquids up until 4 hours before your exam. (Clear liquids include water, clear tea, black coffee and fruit juice without pulp.)  IF YOUR DOCTOR PRESCRIBED ANESTHESIA (be asleep for your exam):   Arrive 1 1/2 hours early. Bring someone who can take you home after the test. You may not drive, take a bus or take a taxi by yourself.   No eating 8 hours before your exam. You may have clear liquids up until 4 hours before your exam. (Clear liquids include water, clear tea, black coffee and fruit juice without pulp.)   You will spend four to five hours in the recovery room.  Please call the Imaging Department at your exam site with any questions.            Jul 25, 2018  2:00 PM CDT   MRA BRAIN ([place]) WWO CONTRAST with UUMR2   [facility], [place], MRI ([facility])    [address] [facility] [address] [phone]           Take your medicines as usual, unless your doctor tells you not to. Bring a list of your current medicines to your exam (including vitamins, minerals and over-the-counter drugs).  You may or may not receive intravenous (IV) contrast for this exam pending the discretion of the Radiologist.  You do not need to do anything special to prepare.  The MRI machine uses a strong magnet. Please wear clothes without metal (snaps, zippers). A sweatsuit works well, or we may give you a hospital gown.  Please remove any body piercings and hair extensions before you arrive. You will also remove watches, jewelry, hairpins, wallets, dentures, partial dental plates and hearing aids. You may wear contact lenses, and you may be able to wear your rings. We have a safe place to keep  your personal items, but it is safer to leave them at home.  **IMPORTANT** THE INSTRUCTIONS BELOW ARE ONLY FOR THOSE PATIENTS WHO HAVE BEEN PRESCRIBED SEDATION OR GENERAL ANESTHESIA DURING THEIR MRI PROCEDURE:  IF YOUR DOCTOR PRESCRIBED ORAL SEDATION (take medicine to help you relax during your exam):   You must get the medicine from your doctor (oral medication) before you arrive. Bring the medicine to the exam. Do not take it at home. You ll be told when to take it upon arriving for your exam.   Arrive one hour early. Bring someone who can take you home after the test. Your medicine will make you sleepy. After the exam, you may not drive, take a bus or take a taxi by yourself.  IF YOUR DOCTOR PRESCRIBED IV SEDATION:   Arrive one hour early. Bring someone who can take you home after the test. Your medicine will make you sleepy. After the exam, you may not drive, take a bus or take a taxi by yourself.   No eating 6 hours before your exam. You may have clear liquids up until 4 hours before your exam. (Clear liquids include water, clear tea, black coffee and fruit juice without pulp.)  IF YOUR DOCTOR PRESCRIBED ANESTHESIA (be asleep for your exam):   Arrive 1 1/2 hours early. Bring someone who can take you home after the test. You may not drive, take a bus or take a taxi by yourself.   No eating 8 hours before your exam. You may have clear liquids up until 4 hours before your exam. (Clear liquids include water, clear tea, black coffee and fruit juice without pulp.)   You will spend four to five hours in the recovery room.  Please call the Imaging Department at your exam site with any questions.            Jul 25, 2018  3:00 PM CDT   MRA NECK (CAROTIDS) W CONTRAST with UUMR2   [facility], [place], MRI ([facility])    [address] [facility] [address] [phone]           Take your medicines as usual, unless your doctor tells you not to. Bring a list of  your current medicines to your exam (including vitamins, minerals and over-the-counter drugs).  You may or may not receive intravenous (IV) contrast for this exam pending the discretion of the Radiologist.  You do not need to do anything special to prepare.  The MRI machine uses a strong magnet. Please wear clothes without metal (snaps, zippers). A sweatsuit works well, or we may give you a hospital gown.  Please remove any body piercings and hair extensions before you arrive. You will also remove watches, jewelry, hairpins, wallets, dentures, partial dental plates and hearing aids. You may wear contact lenses, and you may be able to wear your rings. We have a safe place to keep your personal items, but it is safer to leave them at home.  **IMPORTANT** THE INSTRUCTIONS BELOW ARE ONLY FOR THOSE PATIENTS WHO HAVE BEEN PRESCRIBED SEDATION OR GENERAL ANESTHESIA DURING THEIR MRI PROCEDURE:  IF YOUR DOCTOR PRESCRIBED ORAL SEDATION (take medicine to help you relax during your exam):   You must get the medicine from your doctor (oral medication) before you arrive. Bring the medicine to the exam. Do not take it at home. You ll be told when to take it upon arriving for your exam.   Arrive one hour early. Bring someone who can take you home after the test. Your medicine will make you sleepy. After the exam, you may not drive, take a bus or take a taxi by yourself.  IF YOUR DOCTOR PRESCRIBED IV SEDATION:   Arrive one hour early. Bring someone who can take you home after the test. Your medicine will make you sleepy. After the exam, you may not drive, take a bus or take a taxi by yourself.   No eating 6 hours before your exam. You may have clear liquids up until 4 hours before your exam. (Clear liquids include water, clear tea, black coffee and fruit juice without pulp.)  IF YOUR DOCTOR PRESCRIBED ANESTHESIA (be asleep for your exam):   Arrive 1 1/2 hours early. Bring someone who can take you home after the test. You may not  drive, take a bus or take a taxi by yourself.   No eating 8 hours before your exam. You may have clear liquids up until 4 hours before your exam. (Clear liquids include water, clear tea, black coffee and fruit juice without pulp.)   You will spend four to five hours in the recovery room.  Please call the Imaging Department at your exam site with any questions.            Aug 08, 2018  2:15 PM CDT   (Arrive by 2:00 PM)   Return Visit with Wade Singh MD   [facility] Urology and Inst for Prostate and Urologic Cancers ([facility])    [address] [place] [id number]   [phone]            Aug 08, 2018  4:20 PM CDT   (Arrive by 4:05 PM)   Return Visit with Juana Griffith MD   [facility] Gastroenterology and IBD Clinic ([facility])    [address] [place] [id number]   [phone]            Aug 13, 2018  1:40 PM CDT   (Arrive by 1:25 PM)   NEW THROAT with Seema Eubanks MD   [facility] Ear Nose and Throat ([facility])    [address] [place] [address]   [phone]           This is a multi-disciplinary care team visit as patients with your type of problem are usually seen by a team of an MD and a Speech-Language Pathologist (who is a specialist in disorders of the voice, throat, and breathing).  Please plan about 2 hours for your visit, which will likely include Laryngeal Function Studies, a Voice/Swallow/Breathing Evaluation, and an Endoscopic Laryngeal Examination to provide a comprehensive evaluation.  Please check with your insurance company to ensure you are covered for these services. - It is important to know that if you are evaluated and/or treated by both a physician and a speech pathologist during your visit, your billing will reflect the input that you receive from both providers as separate professionals. Although most insurance plans do  cover these services, we encourage you to contact your insurance company prior to your visit to determine whether there are any coverage limitations that might affect you financially. - Billing/procedure codes that are frequently associated with visits to our clinic include (but are not limited to) the ones listed below. Most patients will not need all of these items, but it may be useful to ask your insurance company's patient . 80982: Flexible fiberoptic laryngoscopy, 53922: Laryngoscopy; flexible or rigid fiberoptic, with stroboscopy, 15759: Flexible endoscopic evaluation of swallowing, 91726: Laryngeal function aerodynamic evaluation, 17586: Evaluation of Voice and Resonance, 40703: Speech pathology treatment for voice, speech, communication, 08105: Speech pathology treatment for swallowing/oral function for feeding - If you have any concerns or questions, or if you would prefer not to have a speech pathologist involved in your visit, please contact our Clinic Coordinator at [phone], at least 24 hours prior to your appointment.            Aug 13, 2018  1:40 PM CDT   (Arrive by 1:25 PM)   [facility] Speech Pathology and ENT Evaluation with  Ent Dysphonia Slp Provider   [facility] Voice ([facility])    [address] [place] [id number] [phone]            Aug 15, 2018  2:45 PM CDT   (Arrive by 2:30 PM)   Return Visit with Zoe Barahona MD   [facility] Ear Nose and Throat ([facility])    [address] [place] [id number] [phone]            Aug 22, 2018  3:40 PM CDT   (Arrive by 3:25 PM)   Return Visit with Juana Griffith MD   [facility] Gastroenterology and IBD Clinic ([facility])    [address] [place] [id number] [phone]            Sep 13, 2018  1:50 PM CDT   (Arrive by 1:35 PM)   RETURN ARRHYTHMIA with Kirk Russell  MD   [facility] ([facility])    [address] [facility]  [address]  [facility] [address] [phone]              Who to contact     Please call your clinic at [phone] to:    Ask questions about your health    Make or cancel appointments    Discuss your medicines    Learn about your test results    Speak to your doctor            Additional Information About Your Visit        [employer] Information     [employer] gives you secure access to your electronic health record. If you see a primary care provider, you can also send messages to your care team and make appointments. If you have questions, please call your primary care clinic.  If you do not have a primary care provider, please call [phone] and they will assist you.      [employer] is an electronic gateway that provides easy, online access to your medical records. With [employer], you can request a clinic appointment, read your test results, renew a prescription or communicate with your care team.     To access your existing account, please contact your [facility] or call [phone] for assistance.        Care EveryWhere ID     This is your Care EveryWhere ID. This could be used by other organizations to access your [facility] medical records  [id number]        Your Vitals Were     Pulse                   59            Blood Pressure from Last 3 Encounters:   07/23/18 115/78   07/23/18 115/78   07/17/18 121/78    Weight from Last 3 Encounters:   07/23/18 92.1 kg (203 lb 0.7 oz)   07/17/18 92.1 kg (203 lb)   07/17/18 93.6 kg (206 lb 4.8 oz)              We Performed the Following     GASTROENTEROLOGY ADULT REF PROCEDURE ONLY        Primary Care Provider Office Phone # Fax #    Ellyn Rivera [phone]       [address] [address]        Equal Access to Services     CHRISTINA GUY : herve Resendez qaybta kaalmada  odette arellanoalan enamoradoaan ah. Poppy [facility] [phone].    ATENCIÓN: Si aroldola jimmie, tiene a sweeney disposición servicios gratuitos de asistencia lingüística. Isaías al [phone].    We comply with applicable federal civil rights laws and [place] laws. We do not discriminate on the basis of race, color, national origin, age, disability, sex, sexual orientation, or gender identity.            Thank you!     Thank you for choosing [facility]  for your care. Our goal is always to provide you with excellent care. Hearing back from our patients is one way we can continue to improve our services. Please take a few minutes to complete the written survey that you may receive in the mail after your visit with us. Thank you!             Your Updated Medication List - Protect others around you: Learn how to safely use, store and throw away your medicines at www.disposemymeds.org.          This list is accurate as of 7/23/18 12:21 PM.  Always use your most recent med list.                   Brand Name Dispense Instructions for use Diagnosis    ACETAMINOPHEN PO      Take 1,000 mg by mouth every 6 hours as needed for pain Take with tramadol        ADVAIR DISKUS 250-50 MCG/DOSE diskus inhaler   Generic drug:  fluticasone-salmeterol      Inhale 1 puff into the lungs 2 times daily as needed        albuterol 108 (90 Base) MCG/ACT Inhaler    PROAIR HFA/PROVENTIL HFA/VENTOLIN HFA     Inhale 2 puffs into the lungs every 6 hours as needed        AMITRIPTYLINE HCL PO      Take 40 mg by mouth daily for two weeks, then increase to 50 mg daily.        buprenorphine 10 MCG/HR WK patch    BUTRANS    4 patch    Place 1 patch onto the skin every 7 days    Postlaminectomy syndrome of lumbar region       butalbital-acetaminophen-caffeine -40 MG per tablet    FIORICET/ESGIC     TAKE ONE TABLET BY MOUTH EVERY 4 HOURS AS NEEDED        cetirizine 10 MG tablet    zyrTEC     Take 10 mg by mouth daily         COMPRESSION STOCKINGS     3 each    1 each daily 20-30 mmHg Thigh Length measure and fit, color and style per patient preference. Doff n jose per need.    Orthostatic hypotension       DULOXETINE HCL PO      Take 60 mg by mouth in the morning and 30 mg in the evening.        eletriptan 20 MG tablet    RELPAX    18 tablet    Take 1 tablet (20 mg) by mouth at onset of headache for migraine May repeat in 2 hours. Max 4 tablets/24 hours.    Chronic daily headache, Intractable migraine without aura and with status migrainosus       EPINEPHrine 0.3 MG/0.3ML injection 2-pack    EPIPEN/ADRENACLICK/or ANY BX GENERIC EQUIV     Inject 0.3 mg into the muscle        famotidine 20 MG tablet    PEPCID    90 tablet    Take 1 tablet (20 mg) by mouth At Bedtime    Hiatal hernia, Mast cell disorder       fludrocortisone 0.1 MG tablet    FLORINEF    180 tablet    Take 2 tablets (0.2 mg) by mouth daily    Autonomic dysfunction, Pre-syncope       fluticasone 50 MCG/ACT spray    FLONASE    48 mL    INSTILL 2 SPRAYS INTO BOTH NOSTRILS DAILY    Chronic rhinitis, unspecified type       linaclotide 145 MCG capsule    LINZESS    30 capsule    Take 1 capsule (145 mcg) by mouth every morning (before breakfast) Last refill until GI clinic follow up.    Chronic idiopathic constipation       medical cannabis (Patient's own supply.  Not a prescription)      Take 1 Dose by mouth See Admin Instructions (This is NOT a prescription, and does not certify that the patient has a qualifying medical condition for medical cannabis.  The purpose of this order is  to document that the patient reports taking medical cannabis.)        metoclopramide 5 MG tablet    REGLAN    180 tablet    Take 1-2 tablets (5-10 mg) by mouth every 6 hours as needed    Intractable chronic migraine without aura and without status migrainosus       MULTIVITAMIN PO      Take 1 tablet by mouth daily        OLANZapine 5 MG tablet    zyPREXA    30 tablet    Take 1 tablet (5 mg) by mouth  daily as needed (headache pain, nausea)    Other migraine with status migrainosus, intractable, Intractable chronic migraine without aura and without status migrainosus       omeprazole 20 MG CR capsule    priLOSEC    90 capsule    Take 1 capsule (20 mg) by mouth daily    Hiatal hernia       ondansetron 4 MG ODT tab    ZOFRAN-ODT    60 tablet    Take 1-2 tablets (4-8 mg) by mouth every 12 hours as needed for nausea    Migraine without status migrainosus, not intractable, unspecified migraine type       oxyCODONE IR 5 MG tablet    ROXICODONE    15 tablet    Take 0.5 tablets (2.5 mg) by mouth every 6 hours as needed for severe pain    Postlaminectomy syndrome       PROBIOTIC DAILY PO      Take 2 packets by mouth every morning        propranolol 80 MG 24 hr capsule    INDERAL LA          Propranolol HCl SR Beads 80 MG Cp24     90 capsule    Take 80 mg by mouth daily    Sinus tachycardia       rizatriptan 5 MG ODT tab    MAXALT-MLT     TAKE 1-2 TABLETS (5-10 MG) BY MOUTH AT ONSET OF HEADACHE FOR MIGRAINE (MAX 30 MG IN 24 HOURS)        SUMAtriptan 6 MG/0.5ML pen injector kit    IMITREX STATDOSE    2 kit    Inject 0.5 mLs (6 mg) Subcutaneous at onset of headache for migraine (may repeat once after 2 hrs) May repeat in 1 hour. Max 12 mg/24 hours.    Chronic migraine without aura without status migrainosus, not intractable       SYNTHROID 25 MCG tablet   Generic drug:  levothyroxine      Take 25 mcg by mouth daily        tiZANidine 4 MG tablet    ZANAFLEX    120 tablet    Take 1 tablet (4 mg) by mouth 4 times daily as needed for muscle spasms        traMADol 50 MG tablet    ULTRAM    60 tablet    Take 1 tablet (50 mg) by mouth every 6 hours as needed for severe pain (Take scheduled every 6 hours for one week, then resume taking as needed)    Postlaminectomy syndrome       VITAMIN D (CHOLECALCIFEROL) PO      Take 2,000 Units by mouth daily        ZONISAMIDE PO      Take 75 mg by mouth daily

## 2018-07-23 NOTE — TELEPHONE ENCOUNTER
linaclotide (LINZESS) 145 MCG capsule   Last Written Prescription Date:  5/14/18  Last Fill Quantity: 30,   # refills: 0  Last Office Visit : 11/22/17  Future Office visit: none    Scheduling has been notified to contact the pt for appointment.      routed because: per last rf fill to next appt, not scheduled.  Visit Disposition   Disposition   Return in about 3 months (around 2/22/2018).     Refuse?

## 2018-07-23 NOTE — IP AVS SNAPSHOT
MRN:[id number]                      After Visit Summary   7/23/2018    Ada Welch    MRN: [id number]           Thank you!     Thank you for choosing [facility] for your care. Our goal is always to provide you with excellent care. Hearing back from our patients is one way we can continue to improve our services. Please take a few minutes to complete the written survey that you may receive in the mail after you visit with us. Thank you!        Patient Information     Date Of Birth          1970        About your hospital stay     You were admitted on:  July 24, 2018 You last received care in the:  [address] [facility]    You were discharged on:  July 25, 2018        Reason for your hospital stay       You were hospitalized for intractable migraine headache.                  Who to Call     For medical emergencies, please call 911.  For non-urgent questions about your medical care, please call your primary care provider or clinic, [phone]          Attending Provider     Provider Specialty    Bob Matthew MD Emergency Medicine    Jorge Pickard MD Neurology       Primary Care Provider Office Phone # Fax #    Ellyn Rivera [phone]      After Care Instructions     Activity       Your activity upon discharge: activity as tolerated            Diet       Follow this diet upon discharge: Orders Placed This Encounter      Snacks/Supplements Adult: Magic Cup; Between Meals      Combination Diet Regular Diet Adult            Discharge Instructions       You were hospitalized for intractable migraine headache and received DHE while here for 4 total doses with improvement in headache. Please note, do NOT take any triptan medication within 24 hrs of the last dose because this can increase risk for vasospasm/stroke. Limit triptan medications to 1 x weekly.  - Try to limit narcotic medications as this will likely make headache resolution  incomplete.   - Follow-up with Dr. Nguyen outpatient                  Follow-up Appointments     Adult [facility] Follow-up and recommended labs and tests       Follow-up with Dr. Nguyen of neurology     Appointments on [place] and/or [facility] (with [facility] or [facility] provider or service). Call [phone] if you haven't heard regarding these appointments within 7 days of discharge.                  Your next 10 appointments already scheduled     Aug 03, 2018   Procedure with Juan Woodson MD   [facility], [place], Endoscopy ([facility])    [address] [address] [phone]           The [facility] is located on the corner of [facility] [address] [facility] on the [facility]. It is easily accessible from virtually any point in the [place] [facility] area, via [address] and [address].            Aug 08, 2018  2:15 PM CDT   (Arrive by 2:00 PM)   Return Visit with Wade Singh MD   [facility] Urology and Inst for Prostate and Urologic Cancers ([facility])    [address] [place] [id number] [phone]            Aug 08, 2018  4:20 PM CDT   (Arrive by 4:05 PM)   Return Visit with Juana Griffith MD   [facility] Gastroenterology and IBD Clinic ([facility])    [address] [place] [address] [phone]            Aug 13, 2018  1:40 PM CDT   (Arrive by 1:25 PM)   NEW THROAT with Seema Eubanks MD   [facility] Ear Nose and Throat ([facility])    [address] [place] [id number] [phone]           This is a multi-disciplinary care team visit as patients with your type of problem are usually seen by a team of an MD and a Speech-Language Pathologist (who is a specialist in disorders of the voice, throat, and breathing).  Please plan about 2 hours for your visit, which  will likely include Laryngeal Function Studies, a Voice/Swallow/Breathing Evaluation, and an Endoscopic Laryngeal Examination to provide a comprehensive evaluation.  Please check with your insurance company to ensure you are covered for these services. - It is important to know that if you are evaluated and/or treated by both a physician and a speech pathologist during your visit, your billing will reflect the input that you receive from both providers as separate professionals. Although most insurance plans do cover these services, we encourage you to contact your insurance company prior to your visit to determine whether there are any coverage limitations that might affect you financially. - Billing/procedure codes that are frequently associated with visits to our clinic include (but are not limited to) the ones listed below. Most patients will not need all of these items, but it may be useful to ask your insurance company's patient . 93661: Flexible fiberoptic laryngoscopy, 70847: Laryngoscopy; flexible or rigid fiberoptic, with stroboscopy, 15129: Flexible endoscopic evaluation of swallowing, 29082: Laryngeal function aerodynamic evaluation, 34156: Evaluation of Voice and Resonance, 27363: Speech pathology treatment for voice, speech, communication, 65390: Speech pathology treatment for swallowing/oral function for feeding - If you have any concerns or questions, or if you would prefer not to have a speech pathologist involved in your visit, please contact our Clinic Coordinator at [phone], at least 24 hours prior to your appointment.            Aug 13, 2018  1:40 PM CDT   (Arrive by 1:25 PM)   [facility] Speech Pathology and ENT Evaluation with  Ent Dysphonia Slp Provider    [facility] ([facility] [place])    [address] [address]   [phone]            Aug 15, 2018  2:45 PM CDT   (Arrive by 2:30 PM)   Return Visit with Zoe HERRMANN  MD Jun   [facility] Ear Nose and Throat ([facility])    [address]  4th [address]  [facility] [address]   [phone]            Aug 22, 2018  3:40 PM CDT   (Arrive by 3:25 PM)   Return Visit with Juana Griffith MD   [facility] Gastroenterology and IBD Clinic ([facility])    [address]  4th Floor  [facility] [address]   [phone]            Sep 13, 2018  1:50 PM CDT   (Arrive by 1:35 PM)   RETURN ARRHYTHMIA with Kirk Russell MD   [facility] Heart Care ([facility])    [address]  [address]  [place] [place] [address]   [phone]            Oct 25, 2018  9:25 AM CDT   (Arrive by 9:10 AM)   Return Visit with Ellyn Rivera MD   [facility] Primary Care Clinic ([facility])    [address]  [address]th [facility] [address]   [phone]              Pending Results     Date and Time Order Name Status Description    7/24/2018 0217 EKG 12-lead, tracing only Preliminary             Statement of Approval     Ordered          07/25/18 1420  I have reviewed and agree with all the recommendations and orders detailed in this document.  EFFECTIVE NOW     Comments:  Monitor for 1 hr after completion of DHE.   Approved and electronically signed by:  Seema Chowdary MD             Admission Information     Date & Time Provider Department Dept. Phone    7/23/2018 Jorge Pickard MD [address] [facility] [phone]      Your Vitals Were     Blood Pressure Pulse Temperature Respirations Weight Last Period    118/70 (BP Location: Right arm) 55 98.1  F (36.7  C) (Oral) 20 93.4 kg (206 lb) 06/30/2018    Pulse Oximetry BMI (Body Mass Index)                98% 32.75 kg/m2          MyChart Information     [employer] gives you secure access to your electronic health record. If you see a primary care provider, you can also send messages to your care team and make  appointments. If you have questions, please call your primary care clinic.  If you do not have a primary care provider, please call [phone] and they will assist you.        Care EveryWhere ID     This is your Care EveryWhere ID. This could be used by other organizations to access your [facility] medical records  [id number]        Equal Access to Services     CHRISTINA GUY : Hadchantel serrano Sonegrito, waaxda luqadaha, qaybta kaalmadonnell arellano, odette hutsonisamargrant dykes . So [facility] [phone].    ATENCIÓN: Si habla español, tiene a sweeney disposición servicios gratuitos de asistencia lingüística. Isaías al [phone].    We comply with applicable federal civil rights laws and [place] laws. We do not discriminate on the basis of race, color, national origin, age, disability, sex, sexual orientation, or gender identity.               Review of your medicines      CONTINUE these medicines which have NOT CHANGED        Dose / Directions    ACETAMINOPHEN PO        Dose:  1000 mg   Take 1,000 mg by mouth every 6 hours as needed for pain Take with tramadol   Refills:  0       ADVAIR DISKUS 250-50 MCG/DOSE diskus inhaler   Generic drug:  fluticasone-salmeterol        Dose:  1 puff   Inhale 1 puff into the lungs 2 times daily as needed   Refills:  0       albuterol 108 (90 Base) MCG/ACT Inhaler   Commonly known as:  PROAIR HFA/PROVENTIL HFA/VENTOLIN HFA        Dose:  2 puff   Inhale 2 puffs into the lungs every 6 hours as needed   Refills:  0       AMITRIPTYLINE HCL PO        Take 40 mg by mouth daily for two weeks, then increase to 50 mg daily.   Refills:  0       buprenorphine 10 MCG/HR WK patch   Commonly known as:  BUTRANS   Used for:  Postlaminectomy syndrome of lumbar region        Dose:  1 patch   Place 1 patch onto the skin every 7 days   Quantity:  4 patch   Refills:  2       butalbital-acetaminophen-caffeine -40 MG per tablet   Commonly known as:  FIORICET/ESGIC        TAKE ONE TABLET BY  MOUTH EVERY 4 HOURS AS NEEDED   Refills:  0       cetirizine 10 MG tablet   Commonly known as:  zyrTEC        Dose:  10 mg   Take 10 mg by mouth daily   Refills:  0       COMPRESSION STOCKINGS   Used for:  Orthostatic hypotension        Dose:  1 each   1 each daily 20-30 mmHg Thigh Length measure and fit, color and style per patient preference. Doff n jose per need.   Quantity:  3 each   Refills:  3       DULOXETINE HCL PO        Take 60 mg by mouth in the morning and 30 mg in the evening.   Refills:  0       eletriptan 20 MG tablet   Commonly known as:  RELPAX   Used for:  Chronic daily headache, Intractable migraine without aura and with status migrainosus        Dose:  20 mg   Take 1 tablet (20 mg) by mouth at onset of headache for migraine May repeat in 2 hours. Max 4 tablets/24 hours.   Quantity:  18 tablet   Refills:  1       EPINEPHrine 0.3 MG/0.3ML injection 2-pack   Commonly known as:  EPIPEN/ADRENACLICK/or ANY BX GENERIC EQUIV        Dose:  0.3 mg   Inject 0.3 mg into the muscle   Refills:  0       famotidine 20 MG tablet   Commonly known as:  PEPCID   Used for:  Hiatal hernia, Mast cell disorder        Dose:  20 mg   Take 1 tablet (20 mg) by mouth At Bedtime   Quantity:  90 tablet   Refills:  1       fludrocortisone 0.1 MG tablet   Commonly known as:  FLORINEF   Used for:  Autonomic dysfunction, Pre-syncope        Dose:  0.2 mg   Take 2 tablets (0.2 mg) by mouth daily   Quantity:  180 tablet   Refills:  3       fluticasone 50 MCG/ACT spray   Commonly known as:  FLONASE   Used for:  Chronic rhinitis, unspecified type        INSTILL 2 SPRAYS INTO BOTH NOSTRILS DAILY   Quantity:  48 mL   Refills:  3       linaclotide 145 MCG capsule   Commonly known as:  LINZESS   Used for:  Chronic idiopathic constipation        Dose:  145 mcg   Take 1 capsule (145 mcg) by mouth every morning (before breakfast) Last refill until GI clinic follow up.   Quantity:  30 capsule   Refills:  0       medical cannabis (Patient's  own supply.  Not a prescription)        Dose:  1 Dose   Take 1 Dose by mouth See Admin Instructions (This is NOT a prescription, and does not certify that the patient has a qualifying medical condition for medical cannabis.  The purpose of this order is  to document that the patient reports taking medical cannabis.)   Refills:  0       metoclopramide 5 MG tablet   Commonly known as:  REGLAN   Used for:  Intractable chronic migraine without aura and without status migrainosus        Dose:  5-10 mg   Take 1-2 tablets (5-10 mg) by mouth every 6 hours as needed   Quantity:  180 tablet   Refills:  1       MULTIVITAMIN PO        Dose:  1 tablet   Take 1 tablet by mouth daily   Refills:  0       OLANZapine 5 MG tablet   Commonly known as:  zyPREXA   Used for:  Other migraine with status migrainosus, intractable, Intractable chronic migraine without aura and without status migrainosus        Dose:  5 mg   Take 1 tablet (5 mg) by mouth daily as needed (headache pain, nausea)   Quantity:  30 tablet   Refills:  3       omeprazole 20 MG CR capsule   Commonly known as:  priLOSEC   Used for:  Hiatal hernia        Dose:  20 mg   Take 1 capsule (20 mg) by mouth daily   Quantity:  90 capsule   Refills:  2       ondansetron 4 MG ODT tab   Commonly known as:  ZOFRAN-ODT   Used for:  Migraine without status migrainosus, not intractable, unspecified migraine type        Dose:  4-8 mg   Take 1-2 tablets (4-8 mg) by mouth every 12 hours as needed for nausea   Quantity:  60 tablet   Refills:  1       oxyCODONE IR 5 MG tablet   Commonly known as:  ROXICODONE   Used for:  Postlaminectomy syndrome        Dose:  2.5 mg   Take 0.5 tablets (2.5 mg) by mouth every 6 hours as needed for severe pain   Quantity:  15 tablet   Refills:  0       PROBIOTIC DAILY PO        Dose:  2 packet   Take 2 packets by mouth every morning   Refills:  0       Propranolol HCl SR Beads 80 MG Cp24   Used for:  Sinus tachycardia        Dose:  80 mg   Take 80 mg by mouth  daily   Quantity:  90 capsule   Refills:  3       rizatriptan 5 MG ODT tab   Commonly known as:  MAXALT-MLT        TAKE 1-2 TABLETS (5-10 MG) BY MOUTH AT ONSET OF HEADACHE FOR MIGRAINE (MAX 30 MG IN 24 HOURS)   Refills:  6       SUMAtriptan 6 MG/0.5ML pen injector kit   Commonly known as:  IMITREX STATDOSE   Used for:  Chronic migraine without aura without status migrainosus, not intractable        Dose:  6 mg   Inject 0.5 mLs (6 mg) Subcutaneous at onset of headache for migraine (may repeat once after 2 hrs) May repeat in 1 hour. Max 12 mg/24 hours.   Quantity:  2 kit   Refills:  1       SYNTHROID 25 MCG tablet   Generic drug:  levothyroxine        Dose:  25 mcg   Take 25 mcg by mouth daily   Refills:  0       tiZANidine 4 MG tablet   Commonly known as:  ZANAFLEX        Dose:  4 mg   Take 1 tablet (4 mg) by mouth 4 times daily as needed for muscle spasms   Quantity:  120 tablet   Refills:  3       traMADol 50 MG tablet   Commonly known as:  ULTRAM   Used for:  Postlaminectomy syndrome        Dose:  50 mg   Take 1 tablet (50 mg) by mouth every 6 hours as needed for severe pain (Take scheduled every 6 hours for one week, then resume taking as needed)   Quantity:  60 tablet   Refills:  0       VITAMIN D (CHOLECALCIFEROL) PO        Dose:  2000 Units   Take 2,000 Units by mouth daily   Refills:  0       ZONISAMIDE PO        Dose:  75 mg   Take 75 mg by mouth daily   Refills:  0         STOP taking     propranolol 80 MG 24 hr capsule   Commonly known as:  INDERAL LA                Where to get your medicines      These medications were sent to [facility] [address] [place] [facility] [place] - [address]  [address] [place] [address]     Phone:  [phone]     linaclotide 145 MCG capsule                Protect others around you: Learn how to safely use, store and throw away your medicines at www.disposemymeds.org.             Medication List: This is a list of all your medications and when to take them. Check marks below  indicate your daily home schedule. Keep this list as a reference.      Medications           Morning Afternoon Evening Bedtime As Needed    ACETAMINOPHEN PO   Take 1,000 mg by mouth every 6 hours as needed for pain Take with tramadol   Last time this was given:  1,000 mg on 7/25/2018 12:37 PM                                ADVAIR DISKUS 250-50 MCG/DOSE diskus inhaler   Inhale 1 puff into the lungs 2 times daily as needed   Generic drug:  fluticasone-salmeterol                                albuterol 108 (90 Base) MCG/ACT Inhaler   Commonly known as:  PROAIR HFA/PROVENTIL HFA/VENTOLIN HFA   Inhale 2 puffs into the lungs every 6 hours as needed                                AMITRIPTYLINE HCL PO   Take 40 mg by mouth daily for two weeks, then increase to 50 mg daily.   Last time this was given:  50 mg on 7/24/2018  9:35 PM                                buprenorphine 10 MCG/HR WK patch   Commonly known as:  BUTRANS   Place 1 patch onto the skin every 7 days                                butalbital-acetaminophen-caffeine -40 MG per tablet   Commonly known as:  FIORICET/ESGIC   TAKE ONE TABLET BY MOUTH EVERY 4 HOURS AS NEEDED                                cetirizine 10 MG tablet   Commonly known as:  zyrTEC   Take 10 mg by mouth daily                                COMPRESSION STOCKINGS   1 each daily 20-30 mmHg Thigh Length measure and fit, color and style per patient preference. Antolin garcia per need.                                DULOXETINE HCL PO   Take 60 mg by mouth in the morning and 30 mg in the evening.   Last time this was given:  60 mg on 7/25/2018  8:36 AM                                eletriptan 20 MG tablet   Commonly known as:  RELPAX   Take 1 tablet (20 mg) by mouth at onset of headache for migraine May repeat in 2 hours. Max 4 tablets/24 hours.                                EPINEPHrine 0.3 MG/0.3ML injection 2-pack   Commonly known as:  EPIPEN/ADRENACLICK/or ANY BX GENERIC EQUIV   Inject  0.3 mg into the muscle                                famotidine 20 MG tablet   Commonly known as:  PEPCID   Take 1 tablet (20 mg) by mouth At Bedtime                                fludrocortisone 0.1 MG tablet   Commonly known as:  FLORINEF   Take 2 tablets (0.2 mg) by mouth daily   Last time this was given:  0.2 mg on 7/24/2018  8:53 AM                                fluticasone 50 MCG/ACT spray   Commonly known as:  FLONASE   INSTILL 2 SPRAYS INTO BOTH NOSTRILS DAILY                                linaclotide 145 MCG capsule   Commonly known as:  LINZESS   Take 1 capsule (145 mcg) by mouth every morning (before breakfast) Last refill until GI clinic follow up.                                medical cannabis (Patient's own supply.  Not a prescription)   Take 1 Dose by mouth See Admin Instructions (This is NOT a prescription, and does not certify that the patient has a qualifying medical condition for medical cannabis.  The purpose of this order is  to document that the patient reports taking medical cannabis.)                                metoclopramide 5 MG tablet   Commonly known as:  REGLAN   Take 1-2 tablets (5-10 mg) by mouth every 6 hours as needed                                MULTIVITAMIN PO   Take 1 tablet by mouth daily                                OLANZapine 5 MG tablet   Commonly known as:  zyPREXA   Take 1 tablet (5 mg) by mouth daily as needed (headache pain, nausea)   Last time this was given:  5 mg on 7/24/2018  9:35 PM                                omeprazole 20 MG CR capsule   Commonly known as:  priLOSEC   Take 1 capsule (20 mg) by mouth daily                                ondansetron 4 MG ODT tab   Commonly known as:  ZOFRAN-ODT   Take 1-2 tablets (4-8 mg) by mouth every 12 hours as needed for nausea                                oxyCODONE IR 5 MG tablet   Commonly known as:  ROXICODONE   Take 0.5 tablets (2.5 mg) by mouth every 6 hours as needed for severe pain                                 PROBIOTIC DAILY PO   Take 2 packets by mouth every morning                                Propranolol HCl SR Beads 80 MG Cp24   Take 80 mg by mouth daily                                rizatriptan 5 MG ODT tab   Commonly known as:  MAXALT-MLT   TAKE 1-2 TABLETS (5-10 MG) BY MOUTH AT ONSET OF HEADACHE FOR MIGRAINE (MAX 30 MG IN 24 HOURS)                                SUMAtriptan 6 MG/0.5ML pen injector kit   Commonly known as:  IMITREX STATDOSE   Inject 0.5 mLs (6 mg) Subcutaneous at onset of headache for migraine (may repeat once after 2 hrs) May repeat in 1 hour. Max 12 mg/24 hours.                                SYNTHROID 25 MCG tablet   Take 25 mcg by mouth daily   Last time this was given:  25 mcg on 7/25/2018  8:41 AM   Generic drug:  levothyroxine                                tiZANidine 4 MG tablet   Commonly known as:  ZANAFLEX   Take 1 tablet (4 mg) by mouth 4 times daily as needed for muscle spasms   Last time this was given:  4 mg on 7/25/2018 12:38 PM                                traMADol 50 MG tablet   Commonly known as:  ULTRAM   Take 1 tablet (50 mg) by mouth every 6 hours as needed for severe pain (Take scheduled every 6 hours for one week, then resume taking as needed)   Last time this was given:  50 mg on 7/25/2018 12:37 PM                                VITAMIN D (CHOLECALCIFEROL) PO   Take 2,000 Units by mouth daily                                ZONISAMIDE PO   Take 75 mg by mouth daily   Last time this was given:  75 mg on 7/25/2018  8:35 AM

## 2018-07-23 NOTE — PROGRESS NOTES
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     Migraine headaches: In about mid-June, the patient began experiencing a severe bifrontal migraine with associated nausea and photophobia which seemed to be positional in nature. Over about a three week period, she attempted to manage her symptoms with her at home interventions including amitriptyline, acetaminophen, duloxetine medical cannabis, metoclopramide, oxycodone, rizatriptan, tizanidine, zonisamide, sumatriptan, ondansetron, and tramadol. Unfortunately, she received little relief, therefore she presented to the emergency department on 07/06 for evaluation of her symptoms. While in the ED, she received IVF boluses as well as Reglan, Zyprexa, and Toradol as well as DHE treatments. Head CT was negative for intracranial pathology, however did reveal maxillary/ethmoid sinus thickening with frothy debris suggestive of acute sinusitis. As her symptoms were not managed with the provided interventions, she was admitted for further pain control. The patient was eventually discharged from the hospital on 07/09 with diagnosis of intractable migraine and slight improvement of her discomfort.     She has been attempting to manager her persist migraine with Maxalt and Imitrex. The patient then followed up with her neurologist, Dr. Nguyen on 07/17. She was recently prescribed a CRPG receptor antagonist, however she has not received this medication yet.  Today, she is experiencing a migraine headache that starts at the base of the back of her head that wraps around to the parietal and temporal areas of her head. She is sensitive to light, sound, and smells right now. She has been  managing nausea with Zofran. Yesterday she took Maxalt twice, and has been taking Toradol which is not effective.  Late last night she took a stat Imitrex dose.  She is taken nothing today except for her nonmigraine medications because she is concerned about overdoing her medications.    Difficulty swallowing: She has been experiencing difficulty swallowing solid foods for over a year. She underwent an upper GI endoscopy as well as combined colonoscopy in March 2017, revealing esophagitis characterized by linear furrowing and trachealization of the entire esophagus as well as a hiatal hernia.  I am unable to find the pathology report in care everywhere however the patient tells me there was eosinophilia.      She was started on omeprazole which provided her relief for a short period of time, however her symptoms returned recently. Solid foods such as chicken and steak are exacerbating these symptoms. She also experiences postprandial abdominal pain in the area of her hiatal hernia. On one occasion, the patient did force herself to vomit up food that was stuck for about an hour. When she induced vomiting, the food came up with what appeared to be blood clots. Her primary care provider recommended a swallow study, however this has not been completed. She is not currently on aspirin or other anticoagulants.  She has not had melena or hematochezia.    Of note, her last colonoscopy did reveal 2 polyps.     Review of Systems:   Pertinent items are noted in HPI, remainder of complete ROS is negative.      Active Medications:      ACETAMINOPHEN PO, Take 1,000 mg by mouth every 6 hours as needed for pain Take with tramadol, Disp: , Rfl:      albuterol (PROAIR HFA/PROVENTIL HFA/VENTOLIN HFA) 108 (90 BASE) MCG/ACT Inhaler, Inhale 2 puffs into the lungs every 6 hours as needed , Disp: , Rfl:      AMITRIPTYLINE HCL PO, Take 40 mg by mouth daily for two weeks, then increase to 50 mg daily., Disp: , Rfl:      buprenorphine  (BUTRANS) 10 MCG/HR WK patch, Place 1 patch onto the skin every 7 days, Disp: 4 patch, Rfl: 2     butalbital-acetaminophen-caffeine (FIORICET/ESGIC) -40 MG per tablet, TAKE ONE TABLET BY MOUTH EVERY 4 HOURS AS NEEDED, Disp: , Rfl: 0     cetirizine (ZYRTEC) 10 MG tablet, Take 10 mg by mouth daily , Disp: , Rfl:      DULOXETINE HCL PO, Take 60 mg by mouth in the morning and 30 mg in the evening., Disp: , Rfl:      eletriptan (RELPAX) 20 MG tablet, Take 1 tablet (20 mg) by mouth at onset of headache for migraine May repeat in 2 hours. Max 4 tablets/24 hours., Disp: 18 tablet, Rfl: 1     EPINEPHrine (EPIPEN/ADRENACLICK/OR ANY BX GENERIC EQUIV) 0.3 MG/0.3ML injection 2-pack, Inject 0.3 mg into the muscle, Disp: , Rfl:      famotidine (PEPCID) 20 MG tablet, Take 1 tablet (20 mg) by mouth At Bedtime, Disp: 90 tablet, Rfl: 1     fludrocortisone (FLORINEF) 0.1 MG tablet, Take 2 tablets (0.2 mg) by mouth daily, Disp: 180 tablet, Rfl: 3     fluticasone (FLONASE) 50 MCG/ACT spray, INSTILL 2 SPRAYS INTO BOTH NOSTRILS DAILY, Disp: 48 mL, Rfl: 3     fluticasone-salmeterol (ADVAIR DISKUS) 250-50 MCG/DOSE diskus inhaler, Inhale 1 puff into the lungs 2 times daily as needed , Disp: , Rfl:      levothyroxine (SYNTHROID) 25 MCG tablet, Take 25 mcg by mouth daily, Disp: , Rfl:      linaclotide (LINZESS) 145 MCG capsule, Take 1 capsule (145 mcg) by mouth every morning (before breakfast) Last refill until GI clinic follow up., Disp: 30 capsule, Rfl: 0     medical cannabis (Patient's own supply.  Not a prescription), Take 1 Dose by mouth See Admin Instructions (This is NOT a prescription, and does not certify that the patient has a qualifying medical condition for medical cannabis.  The purpose of this order is  to document that the patient reports taking medical cannabis.), Disp: , Rfl:      metoclopramide (REGLAN) 5 MG tablet, Take 1-2 tablets (5-10 mg) by mouth every 6 hours as needed, Disp: 180 tablet, Rfl: 1     Multiple  Vitamins-Minerals (MULTIVITAMIN PO), Take 1 tablet by mouth daily , Disp: , Rfl:      OLANZapine (ZYPREXA) 5 MG tablet, Take 1 tablet (5 mg) by mouth daily as needed (headache pain, nausea), Disp: 30 tablet, Rfl: 3     omeprazole (PRILOSEC) 20 MG CR capsule, Take 1 capsule (20 mg) by mouth daily, Disp: 90 capsule, Rfl: 2     ondansetron (ZOFRAN-ODT) 4 MG ODT tab, Take 1-2 tablets (4-8 mg) by mouth every 12 hours as needed for nausea, Disp: 60 tablet, Rfl: 1     oxyCODONE IR (ROXICODONE) 5 MG tablet, Take 0.5 tablets (2.5 mg) by mouth every 6 hours as needed for severe pain, Disp: 15 tablet, Rfl: 0     Probiotic Product (PROBIOTIC DAILY PO), Take 2 packets by mouth every morning , Disp: , Rfl:      propranolol (INDERAL LA) 80 MG 24 hr capsule, , Disp: , Rfl: 2     rizatriptan (MAXALT-MLT) 5 MG ODT tab, TAKE 1-2 TABLETS (5-10 MG) BY MOUTH AT ONSET OF HEADACHE FOR MIGRAINE (MAX 30 MG IN 24 HOURS), Disp: , Rfl: 6     SUMAtriptan (IMITREX STATDOSE) 6 MG/0.5ML pen injector kit, Inject 0.5 mLs (6 mg) Subcutaneous at onset of headache for migraine (may repeat once after 2 hrs) May repeat in 1 hour. Max 12 mg/24 hours., Disp: 2 kit, Rfl: 1     tiZANidine (ZANAFLEX) 4 MG tablet, Take 1 tablet (4 mg) by mouth 4 times daily as needed for muscle spasms, Disp: 120 tablet, Rfl: 3     traMADol (ULTRAM) 50 MG tablet, Take 1 tablet (50 mg) by mouth every 6 hours as needed for severe pain (Take scheduled every 6 hours for one week, then resume taking as needed), Disp: 60 tablet, Rfl: 0     VITAMIN D, CHOLECALCIFEROL, PO, Take 2,000 Units by mouth daily, Disp: , Rfl:      ZONISAMIDE PO, Take 75 mg by mouth daily, Disp: , Rfl:       Allergies:   Bee venom  No clinical screening - see comments  Chicken-derived products (egg)  Compazine [prochlorperazine]  Gabapentin  Gluten meal  Topiramate  Wheat  Wheat bran      Past Medical History:  Allergic rhinitis   Immunosuppressive autoimmune disease   Blood clotting disorder   Chronic  tonsillitis   Tom-Danlos disease   Gastroesophageal reflux disease (GERD)    Hiatal hernia   Hoarseness     Nasal polyps   Neuralgia  Neuropathy  Pneumonia   Thyroid disease   Chronic pain syndrome  Cervicalgia  Slow transit constipation  Urinary urgency  Cardiac device in situ- [employer] Implanted Loop Recorder (ILR)  Acquired hypothyroidism  Anxiety disorder  Astigmatism  Chronic sinusitis with recurrent bronchitis  Arthritis involving multiple sites  Cyst of fallopian tube  Depression  Deviated nasal septum (DNS)  Essential hypertension with goal blood pressure less than 140/90  Head and neck lymphadenopathy  Hepatomegaly  Keratoconjunctivitis sicca  Low back pain radiating to both legs  Liver lesion  Lumbar degenerative disc disease (DDD) with radiculopathy  Intractable chronic migraine without aura and without status migrainosus  Nasal turbinate hypertrophy  Non compliance with medical treatment  Obese  Opioid type dependence, continuous  Other acute postoperative pain  Pain disorder associated with psychological and physical factors  Presbyopia  Spondylolisthesis of lumbar region  Sinus tachycardia  Uterine endometriosis  Autonomic dysfunction  Temporomandibular joint (TMJ) disorder      Past Surgical History:  Adenoidectomy   Nasal septum repair   Spinal fusion   Mole biopsy   Lymph node biopsy   Nasal/sinus polypectomy   Tonsillectomy   Tubal ligation     Family History:   Tom-Danlos syndrome  Spinal tumor   Migraines   Diabetes    Heart disease   Hypertension   Asthma   Thyroid disease     Social History:   The patient is  with 5 children, a former smoker (quit date: 12/2013), and does consume alcohol.      Physical Exam:   /78  Pulse 59   Constitutional: Pleasant, quiet, cooperative young woman lying on the exam table holding her head between her hands in a dark room.  Head: Tenderness at the occipital knob's bilaterally, temporalis muscle bilaterally.    Assessment and Plan:    1.   Migraine headaches, chronic, status migrainosus  Headache present since at least July 6, and worsening over the last 3 days.  I am unable to reach her neurologist today by pager [phone].  Plan to transfer her to the emergency department today for continued evaluation and probable admission to the neurology service for treatment with DHE or some other medication.    2.  Dysphagia to solids.  There is evidence of lower esophageal obstruction whether it is mechanical or muscular I cannot say.  I cannot find the biopsy report from a year and a half ago.  She is now coughing up bloody meat.  Plan EGD.  CBC with  Diff and plts       Follow-up: After 25 minutes in the clinic waiting for [facility] to take her to the ED, the patient expressed concern that if she were admitted to the hospital she would be unable to care for her 2 younger children.  Her  will not be home until Wednesday night.  She feels that she can manage the headache for now at home as it has been relatively stable for the last 3 days.  We will send her home after scheduling EGD and getting CBC with differential and platelets today.  I will contact her neurologist to update her on this event.  I called the ED to inform them that she will not be coming to the ED today.      I spent a total of 15 minutes face-to-face with Ada Welch during today's office visit.  Over 50% of this time was spent counseling the patient and/or coordinating care regarding status migrainosus and dysphagia to solids.  See note for details.       Scribe Disclosure:  I, Мария Goldstein, am serving as a scribe to document services personally performed by Shilpa Galicia MD at this visit, based upon the provider's statements to me. All documentation has been reviewed by the aforementioned provider prior to being entered into the official medical record.     Portions of this medical record were completed by a scribe. UPON MY REVIEW AND AUTHENTICATION BY ELECTRONIC  SIGNATURE, this confirms (a) I performed the applicable clinical services, and (b) the record is accurate.

## 2018-07-23 NOTE — IP AVS SNAPSHOT
[address] [address]    Phone:  [phone]                                       After Visit Summary   7/23/2018    Ada Welch    MRN: [id number]           After Visit Summary Signature Page     I have received my discharge instructions, and my questions have been answered. I have discussed any challenges I see with this plan with the nurse or doctor.    ..........................................................................................................................................  Patient/Patient Representative Signature      ..........................................................................................................................................  Patient Representative Print Name and Relationship to Patient    ..................................................               ................................................  Date                                            Time    ..........................................................................................................................................  Reviewed by Signature/Title    ...................................................              ..............................................  Date                                                            Time

## 2018-07-23 NOTE — NURSING NOTE
Chief Complaint   Patient presents with     Hospital F/U     Re check on medications and migraines.       Brittni Francis LPN at 11:43 AM on 7/23/2018

## 2018-07-24 ENCOUNTER — PRE VISIT (OUTPATIENT)
Dept: UROLOGY | Facility: CLINIC | Age: 48
End: 2018-07-24

## 2018-07-24 ENCOUNTER — CARE COORDINATION (OUTPATIENT)
Dept: GASTROENTEROLOGY | Facility: CLINIC | Age: 48
End: 2018-07-24

## 2018-07-24 PROBLEM — G43.911 INTRACTABLE MIGRAINE WITH STATUS MIGRAINOSUS: Status: ACTIVE | Noted: 2018-07-24

## 2018-07-24 LAB
ALBUMIN UR-MCNC: NEGATIVE MG/DL
ANION GAP SERPL CALCULATED.3IONS-SCNC: 7 MMOL/L (ref 3–14)
APPEARANCE UR: ABNORMAL
BILIRUB UR QL STRIP: NEGATIVE
BUN SERPL-MCNC: 9 MG/DL (ref 7–30)
CALCIUM SERPL-MCNC: 7.8 MG/DL (ref 8.5–10.1)
CHLORIDE SERPL-SCNC: 109 MMOL/L (ref 94–109)
CO2 SERPL-SCNC: 23 MMOL/L (ref 20–32)
COLOR UR AUTO: YELLOW
CREAT SERPL-MCNC: 0.85 MG/DL (ref 0.52–1.04)
ERYTHROCYTE [DISTWIDTH] IN BLOOD BY AUTOMATED COUNT: 13.1 % (ref 10–15)
GFR SERPL CREATININE-BSD FRML MDRD: 72 ML/MIN/1.7M2
GLUCOSE SERPL-MCNC: 97 MG/DL (ref 70–99)
GLUCOSE UR STRIP-MCNC: NEGATIVE MG/DL
HCT VFR BLD AUTO: 35.9 % (ref 35–47)
HGB BLD-MCNC: 12.1 G/DL (ref 11.7–15.7)
HGB UR QL STRIP: ABNORMAL
KETONES UR STRIP-MCNC: NEGATIVE MG/DL
LEUKOCYTE ESTERASE UR QL STRIP: NEGATIVE
MCH RBC QN AUTO: 29.2 PG (ref 26.5–33)
MCHC RBC AUTO-ENTMCNC: 33.7 G/DL (ref 31.5–36.5)
MCV RBC AUTO: 87 FL (ref 78–100)
MUCOUS THREADS #/AREA URNS LPF: PRESENT /LPF
NITRATE UR QL: NEGATIVE
PH UR STRIP: 5.5 PH (ref 5–7)
PLATELET # BLD AUTO: 231 10E9/L (ref 150–450)
POTASSIUM SERPL-SCNC: 3.8 MMOL/L (ref 3.4–5.3)
RBC # BLD AUTO: 4.14 10E12/L (ref 3.8–5.2)
RBC #/AREA URNS AUTO: 1 /HPF (ref 0–2)
SODIUM SERPL-SCNC: 139 MMOL/L (ref 133–144)
SOURCE: ABNORMAL
SP GR UR STRIP: 1.01 (ref 1–1.03)
SQUAMOUS #/AREA URNS AUTO: 2 /HPF (ref 0–1)
TRANS CELLS #/AREA URNS HPF: <1 /HPF (ref 0–1)
UROBILINOGEN UR STRIP-MCNC: NORMAL MG/DL (ref 0–2)
WBC # BLD AUTO: 5.5 10E9/L (ref 4–11)
WBC #/AREA URNS AUTO: 1 /HPF (ref 0–5)

## 2018-07-24 PROCEDURE — 25000132 ZZH RX MED GY IP 250 OP 250 PS 637: Performed by: STUDENT IN AN ORGANIZED HEALTH CARE EDUCATION/TRAINING PROGRAM

## 2018-07-24 PROCEDURE — 40000556 ZZH STATISTIC PERIPHERAL IV START W US GUIDANCE

## 2018-07-24 PROCEDURE — 93010 ELECTROCARDIOGRAM REPORT: CPT | Performed by: INTERNAL MEDICINE

## 2018-07-24 PROCEDURE — 85027 COMPLETE CBC AUTOMATED: CPT | Performed by: STUDENT IN AN ORGANIZED HEALTH CARE EDUCATION/TRAINING PROGRAM

## 2018-07-24 PROCEDURE — 93005 ELECTROCARDIOGRAM TRACING: CPT

## 2018-07-24 PROCEDURE — 81001 URINALYSIS AUTO W/SCOPE: CPT | Performed by: STUDENT IN AN ORGANIZED HEALTH CARE EDUCATION/TRAINING PROGRAM

## 2018-07-24 PROCEDURE — 80048 BASIC METABOLIC PNL TOTAL CA: CPT | Performed by: STUDENT IN AN ORGANIZED HEALTH CARE EDUCATION/TRAINING PROGRAM

## 2018-07-24 PROCEDURE — 25000128 H RX IP 250 OP 636: Performed by: STUDENT IN AN ORGANIZED HEALTH CARE EDUCATION/TRAINING PROGRAM

## 2018-07-24 PROCEDURE — 36415 COLL VENOUS BLD VENIPUNCTURE: CPT | Performed by: STUDENT IN AN ORGANIZED HEALTH CARE EDUCATION/TRAINING PROGRAM

## 2018-07-24 PROCEDURE — 12000008 ZZH R&B INTERMEDIATE UMMC

## 2018-07-24 PROCEDURE — 25000132 ZZH RX MED GY IP 250 OP 250 PS 637: Performed by: PSYCHIATRY & NEUROLOGY

## 2018-07-24 RX ORDER — PROPRANOLOL HYDROCHLORIDE 80 MG/1
80 CAPSULE, EXTENDED RELEASE ORAL DAILY
Status: DISCONTINUED | OUTPATIENT
Start: 2018-07-24 | End: 2018-07-25 | Stop reason: HOSPADM

## 2018-07-24 RX ORDER — ALBUTEROL SULFATE 90 UG/1
2 AEROSOL, METERED RESPIRATORY (INHALATION) EVERY 6 HOURS PRN
Status: DISCONTINUED | OUTPATIENT
Start: 2018-07-24 | End: 2018-07-25 | Stop reason: HOSPADM

## 2018-07-24 RX ORDER — POLYETHYLENE GLYCOL 3350 17 G/17G
17 POWDER, FOR SOLUTION ORAL DAILY PRN
Status: DISCONTINUED | OUTPATIENT
Start: 2018-07-24 | End: 2018-07-25 | Stop reason: HOSPADM

## 2018-07-24 RX ORDER — SODIUM CHLORIDE 9 MG/ML
INJECTION, SOLUTION INTRAVENOUS CONTINUOUS
Status: DISCONTINUED | OUTPATIENT
Start: 2018-07-24 | End: 2018-07-25

## 2018-07-24 RX ORDER — AMITRIPTYLINE HYDROCHLORIDE 50 MG/1
50 TABLET ORAL AT BEDTIME
Status: DISCONTINUED | OUTPATIENT
Start: 2018-07-24 | End: 2018-07-25 | Stop reason: HOSPADM

## 2018-07-24 RX ORDER — ACETAMINOPHEN 500 MG
1000 TABLET ORAL EVERY 6 HOURS PRN
Status: DISCONTINUED | OUTPATIENT
Start: 2018-07-24 | End: 2018-07-25 | Stop reason: HOSPADM

## 2018-07-24 RX ORDER — TRAMADOL HYDROCHLORIDE 50 MG/1
50 TABLET ORAL EVERY 6 HOURS PRN
Status: DISCONTINUED | OUTPATIENT
Start: 2018-07-24 | End: 2018-07-24

## 2018-07-24 RX ORDER — MAGNESIUM SULFATE HEPTAHYDRATE 40 MG/ML
4 INJECTION, SOLUTION INTRAVENOUS EVERY 4 HOURS PRN
Status: DISCONTINUED | OUTPATIENT
Start: 2018-07-24 | End: 2018-07-25 | Stop reason: HOSPADM

## 2018-07-24 RX ORDER — METOCLOPRAMIDE HYDROCHLORIDE 5 MG/ML
10 INJECTION INTRAMUSCULAR; INTRAVENOUS EVERY 8 HOURS PRN
Status: DISCONTINUED | OUTPATIENT
Start: 2018-07-24 | End: 2018-07-25 | Stop reason: HOSPADM

## 2018-07-24 RX ORDER — SODIUM CHLORIDE 9 MG/ML
INJECTION, SOLUTION INTRAVENOUS CONTINUOUS
Status: DISCONTINUED | OUTPATIENT
Start: 2018-07-24 | End: 2018-07-25 | Stop reason: HOSPADM

## 2018-07-24 RX ORDER — AMOXICILLIN 250 MG
2 CAPSULE ORAL 2 TIMES DAILY PRN
Status: DISCONTINUED | OUTPATIENT
Start: 2018-07-24 | End: 2018-07-25 | Stop reason: HOSPADM

## 2018-07-24 RX ORDER — POTASSIUM CHLORIDE 1.5 G/1.58G
20-40 POWDER, FOR SOLUTION ORAL
Status: DISCONTINUED | OUTPATIENT
Start: 2018-07-24 | End: 2018-07-25 | Stop reason: HOSPADM

## 2018-07-24 RX ORDER — DIHYDROERGOTAMINE MESYLATE 1 MG/ML
0.5 INJECTION, SOLUTION INTRAMUSCULAR; INTRAVENOUS; SUBCUTANEOUS ONCE
Status: DISCONTINUED | OUTPATIENT
Start: 2018-07-25 | End: 2018-07-24

## 2018-07-24 RX ORDER — DULOXETIN HYDROCHLORIDE 60 MG/1
60 CAPSULE, DELAYED RELEASE ORAL EVERY MORNING
Status: DISCONTINUED | OUTPATIENT
Start: 2018-07-24 | End: 2018-07-25 | Stop reason: HOSPADM

## 2018-07-24 RX ORDER — POTASSIUM CHLORIDE 7.45 MG/ML
10 INJECTION INTRAVENOUS
Status: DISCONTINUED | OUTPATIENT
Start: 2018-07-24 | End: 2018-07-25 | Stop reason: HOSPADM

## 2018-07-24 RX ORDER — OLANZAPINE 5 MG/1
5 TABLET ORAL DAILY PRN
Status: DISCONTINUED | OUTPATIENT
Start: 2018-07-24 | End: 2018-07-25 | Stop reason: HOSPADM

## 2018-07-24 RX ORDER — LEVOTHYROXINE SODIUM 25 UG/1
25 TABLET ORAL
Status: DISCONTINUED | OUTPATIENT
Start: 2018-07-24 | End: 2018-07-25 | Stop reason: HOSPADM

## 2018-07-24 RX ORDER — FLUDROCORTISONE ACETATE 0.1 MG/1
0.2 TABLET ORAL DAILY
Status: DISCONTINUED | OUTPATIENT
Start: 2018-07-24 | End: 2018-07-25 | Stop reason: HOSPADM

## 2018-07-24 RX ORDER — FAMOTIDINE 20 MG/1
20 TABLET, FILM COATED ORAL AT BEDTIME
Status: DISCONTINUED | OUTPATIENT
Start: 2018-07-24 | End: 2018-07-25 | Stop reason: HOSPADM

## 2018-07-24 RX ORDER — NALOXONE HYDROCHLORIDE 0.4 MG/ML
.1-.4 INJECTION, SOLUTION INTRAMUSCULAR; INTRAVENOUS; SUBCUTANEOUS
Status: DISCONTINUED | OUTPATIENT
Start: 2018-07-24 | End: 2018-07-25 | Stop reason: HOSPADM

## 2018-07-24 RX ORDER — POTASSIUM CHLORIDE 750 MG/1
20-40 TABLET, EXTENDED RELEASE ORAL
Status: DISCONTINUED | OUTPATIENT
Start: 2018-07-24 | End: 2018-07-25 | Stop reason: HOSPADM

## 2018-07-24 RX ORDER — POTASSIUM CHLORIDE 29.8 MG/ML
20 INJECTION INTRAVENOUS
Status: DISCONTINUED | OUTPATIENT
Start: 2018-07-24 | End: 2018-07-25 | Stop reason: HOSPADM

## 2018-07-24 RX ORDER — DIHYDROERGOTAMINE MESYLATE 1 MG/ML
1 INJECTION, SOLUTION INTRAMUSCULAR; INTRAVENOUS; SUBCUTANEOUS EVERY 8 HOURS
Status: DISCONTINUED | OUTPATIENT
Start: 2018-07-24 | End: 2018-07-25 | Stop reason: HOSPADM

## 2018-07-24 RX ORDER — TRAMADOL HYDROCHLORIDE 50 MG/1
50 TABLET ORAL EVERY 6 HOURS PRN
Status: DISCONTINUED | OUTPATIENT
Start: 2018-07-24 | End: 2018-07-25 | Stop reason: HOSPADM

## 2018-07-24 RX ORDER — MULTIPLE VITAMINS W/ MINERALS TAB 9MG-400MCG
1 TAB ORAL DAILY
Status: DISCONTINUED | OUTPATIENT
Start: 2018-07-24 | End: 2018-07-25 | Stop reason: HOSPADM

## 2018-07-24 RX ORDER — DULOXETIN HYDROCHLORIDE 30 MG/1
30 CAPSULE, DELAYED RELEASE ORAL AT BEDTIME
Status: DISCONTINUED | OUTPATIENT
Start: 2018-07-24 | End: 2018-07-25 | Stop reason: HOSPADM

## 2018-07-24 RX ORDER — POTASSIUM CL/LIDO/0.9 % NACL 10MEQ/0.1L
10 INTRAVENOUS SOLUTION, PIGGYBACK (ML) INTRAVENOUS
Status: DISCONTINUED | OUTPATIENT
Start: 2018-07-24 | End: 2018-07-25 | Stop reason: HOSPADM

## 2018-07-24 RX ORDER — ONDANSETRON 2 MG/ML
4 INJECTION INTRAMUSCULAR; INTRAVENOUS EVERY 8 HOURS PRN
Status: DISCONTINUED | OUTPATIENT
Start: 2018-07-24 | End: 2018-07-24

## 2018-07-24 RX ORDER — MAGNESIUM SULFATE HEPTAHYDRATE 40 MG/ML
2 INJECTION, SOLUTION INTRAVENOUS ONCE
Status: COMPLETED | OUTPATIENT
Start: 2018-07-24 | End: 2018-07-24

## 2018-07-24 RX ORDER — AMOXICILLIN 250 MG
1 CAPSULE ORAL 2 TIMES DAILY PRN
Status: DISCONTINUED | OUTPATIENT
Start: 2018-07-24 | End: 2018-07-25 | Stop reason: HOSPADM

## 2018-07-24 RX ORDER — BISACODYL 10 MG
10 SUPPOSITORY, RECTAL RECTAL DAILY PRN
Status: DISCONTINUED | OUTPATIENT
Start: 2018-07-24 | End: 2018-07-25 | Stop reason: HOSPADM

## 2018-07-24 RX ADMIN — LEVOTHYROXINE SODIUM 25 MCG: 25 TABLET ORAL at 08:54

## 2018-07-24 RX ADMIN — SODIUM CHLORIDE: 9 INJECTION, SOLUTION INTRAVENOUS at 16:42

## 2018-07-24 RX ADMIN — TRAMADOL HYDROCHLORIDE 50 MG: 50 TABLET, COATED ORAL at 19:55

## 2018-07-24 RX ADMIN — DULOXETINE HYDROCHLORIDE 60 MG: 60 CAPSULE, DELAYED RELEASE ORAL at 09:00

## 2018-07-24 RX ADMIN — ZONISAMIDE 75 MG: 50 CAPSULE ORAL at 08:53

## 2018-07-24 RX ADMIN — OLANZAPINE 5 MG: 5 TABLET, FILM COATED ORAL at 21:35

## 2018-07-24 RX ADMIN — TIZANIDINE 4 MG: 4 TABLET ORAL at 03:20

## 2018-07-24 RX ADMIN — TRAMADOL HYDROCHLORIDE 50 MG: 50 TABLET, COATED ORAL at 03:21

## 2018-07-24 RX ADMIN — PROPRANOLOL HYDROCHLORIDE 80 MG: 80 CAPSULE, EXTENDED RELEASE ORAL at 08:53

## 2018-07-24 RX ADMIN — DIHYDROERGOTAMINE MESYLATE 0.5 MG: 1 INJECTION, SOLUTION INTRAMUSCULAR; INTRAVENOUS; SUBCUTANEOUS at 21:35

## 2018-07-24 RX ADMIN — MULTIPLE VITAMINS W/ MINERALS TAB 1 TABLET: TAB at 13:28

## 2018-07-24 RX ADMIN — SODIUM CHLORIDE: 9 INJECTION, SOLUTION INTRAVENOUS at 02:00

## 2018-07-24 RX ADMIN — OLANZAPINE 5 MG: 5 TABLET, FILM COATED ORAL at 03:29

## 2018-07-24 RX ADMIN — TIZANIDINE 4 MG: 4 TABLET ORAL at 13:28

## 2018-07-24 RX ADMIN — DULOXETINE HYDROCHLORIDE 30 MG: 30 CAPSULE, DELAYED RELEASE ORAL at 21:35

## 2018-07-24 RX ADMIN — DULOXETINE HYDROCHLORIDE 30 MG: 30 CAPSULE, DELAYED RELEASE ORAL at 03:21

## 2018-07-24 RX ADMIN — TIZANIDINE 4 MG: 4 TABLET ORAL at 19:55

## 2018-07-24 RX ADMIN — METOCLOPRAMIDE 10 MG: 5 INJECTION, SOLUTION INTRAMUSCULAR; INTRAVENOUS at 20:55

## 2018-07-24 RX ADMIN — DIHYDROERGOTAMINE MESYLATE 0.5 MG: 1 INJECTION INTRAMUSCULAR; INTRAVENOUS; SUBCUTANEOUS at 09:38

## 2018-07-24 RX ADMIN — FLUDROCORTISONE ACETATE 0.2 MG: 0.1 TABLET ORAL at 08:53

## 2018-07-24 RX ADMIN — ONDANSETRON 4 MG: 2 INJECTION INTRAMUSCULAR; INTRAVENOUS at 08:57

## 2018-07-24 RX ADMIN — TRAMADOL HYDROCHLORIDE 50 MG: 50 TABLET, COATED ORAL at 13:30

## 2018-07-24 RX ADMIN — ACETAMINOPHEN 1000 MG: 500 TABLET, FILM COATED ORAL at 13:28

## 2018-07-24 RX ADMIN — AMITRIPTYLINE HYDROCHLORIDE 50 MG: 50 TABLET, FILM COATED ORAL at 21:35

## 2018-07-24 RX ADMIN — MAGNESIUM SULFATE HEPTAHYDRATE 2 G: 40 INJECTION, SOLUTION INTRAVENOUS at 02:00

## 2018-07-24 ASSESSMENT — ACTIVITIES OF DAILY LIVING (ADL)
ADLS_ACUITY_SCORE: 10
ADLS_ACUITY_SCORE: 9
NUMBER_OF_TIMES_PATIENT_HAS_FALLEN_WITHIN_LAST_SIX_MONTHS: 1
AMBULATION: 0-->INDEPENDENT
ADLS_ACUITY_SCORE: 10
TOILETING: 0-->INDEPENDENT
BATHING: 0-->INDEPENDENT
FALL_HISTORY_WITHIN_LAST_SIX_MONTHS: YES
RETIRED_EATING: 0-->INDEPENDENT
ADLS_ACUITY_SCORE: 9
SWALLOWING: 0-->SWALLOWS FOODS/LIQUIDS WITHOUT DIFFICULTY
ADLS_ACUITY_SCORE: 9
COGNITION: 0 - NO COGNITION ISSUES REPORTED
TRANSFERRING: 0-->INDEPENDENT
WHICH_OF_THE_ABOVE_FUNCTIONAL_RISKS_HAD_A_RECENT_ONSET_OR_CHANGE?: FALL HISTORY
RETIRED_COMMUNICATION: 0-->UNDERSTANDS/COMMUNICATES WITHOUT DIFFICULTY
DRESS: 0-->INDEPENDENT

## 2018-07-24 ASSESSMENT — VISUAL ACUITY
OU: DOUBLE VISION/DIPLOPIA;BLURRED VISION
OU: BLURRED VISION;DOUBLE VISION/DIPLOPIA
OU: DOUBLE VISION/DIPLOPIA;BLURRED VISION
OU: DOUBLE VISION/DIPLOPIA;BLURRED VISION

## 2018-07-24 NOTE — ED PROVIDER NOTES
History     Chief Complaint   Patient presents with     Headache     HPI  Ada Welch is an immunosppressed 47 year old female with a history of Tom-Danlos disease c/b chronic pain/migraines/TMJ, chronic pansinusitis, allergic rhinitis, and GERD who was sent in by her clinic for evaluation of a headache. Patient reports she had planned to come in earlier but had been unable to find a  for her children. She states she has been experiencing a constant headache for the past 5 weeks that let up once when she was admitted on 7/6/18. On that visit, patient was seen by Neurology and underwent DHE protocol. Patient reports she is scheduled for an MRI tomorrow.       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     Chronic sinusitis 00/2007    Diagnosed with chronic pansinusitis 2016     Chronic tonsillitis 1980    Had tonsils removed 02/1981, rheumatic fever     Tom-Danlos disease      Gastroesophageal reflux disease 3/1/2017    I have large hiatal hernia     Hernia, abdominal 10/2016    Hiatal Hernia     Hiatal hernia 2016    Have adeline hiatel hernia     Hoarseness Unsure    It comes and goes     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     Migraines 1/1/2007    Unsure of exact date     Nasal polyps 2013    Were revoved 03/2017, benign     Other nervous system complications 1/2014    Autonomic nervous system disorder, neuralgia, neuropathy     Pneumonia Unsure    Have had pneumonia several times     Swelling, mass, or lump in head and neck 08/2016    Lymph node has been growing for last year     Thyroid disease 01/01/2008       Past Surgical History:   Procedure Laterality Date     ADENOIDECTOMY  1981     AS REPAIR  OF NASAL SEPTUM       BACK SURGERY  12/09/2013  10/01/2014    Spinal fusion L4-S1, L5 moving 5/8ths in. Remove screws/rods     BIOPSY  01/01/2008 & 3/2017    mole biopsy, lymph node biopsy     COLONOSCOPY  3/2017    Colonoscopy and GI     NASAL/SINUS POLYPECTOMY  2017    Polyps were benign     TONSILLECTOMY       TONSILLECTOMY  1981     TUBAL LIGATION         Family History   Problem Relation Age of Onset     Connective Tissue Disorder Mother      eds     Connective Tissue Disorder Sister      eds     Cancer Father      Spinal tumor grew into spinal cord, went in brain     Migraines Father      Diabetes Maternal Grandmother      Elderly diabetes     HEART DISEASE Maternal Grandmother      Hypertension Maternal Grandmother      Diabetes Paternal Grandmother      Elderly diabetes     Diabetes Paternal Grandfather      Elderly diabetes     Asthma Sister      Pediatric Asthma     Migraines Sister      Asthma Son      Pediatric Asthma     Thyroid Disease Sister      ,     Migraines Sister      Migraines Sister      Breast Cancer No family hx of        Social History   Substance Use Topics     Smoking status: Former Smoker     Packs/day: 0.20     Years: 10.00     Types: Cigarettes     Start date: 1/1/1991     Quit date: 12/1/2013     Smokeless tobacco: Never Used      Comment: I somked on and off during specified dates.     Alcohol use Yes      Comment: Less than 5 drinks per year       Current Facility-Administered Medications   Medication     acetaminophen (TYLENOL) tablet 1,000 mg     albuterol (PROAIR HFA/PROVENTIL HFA/VENTOLIN HFA) Inhaler 2 puff     amitriptyline (ELAVIL) tablet 50 mg     bisacodyl (DULCOLAX) Suppository 10 mg     dihydroergotamine (DHE) 0.5 mg in sodium chloride 0.9 % 100 mL     DULoxetine (CYMBALTA) EC capsule 30 mg     DULoxetine (CYMBALTA) EC capsule 60 mg     famotidine (PEPCID) tablet 20 mg     fludrocortisone (FLORINEF) tablet 0.2 mg     fluticasone-vilanterol (BREO ELLIPTA) 200-25 MCG/INH  oral inhaler 1 puff     levothyroxine (SYNTHROID/LEVOTHROID) tablet 25 mcg     magnesium sulfate 4 g in 100 mL sterile water (premade)     medical cannabis self-directed use allowed by Medical Cannabis Policy     melatonin tablet 1 mg     naloxone (NARCAN) injection 0.1-0.4 mg     OLANZapine (zyPREXA) tablet 5 mg     ondansetron (ZOFRAN) injection 4 mg     oxyCODONE IR (ROXICODONE) half-tab 2.5 mg     polyethylene glycol (MIRALAX/GLYCOLAX) Packet 17 g     potassium chloride (KLOR-CON) Packet 20-40 mEq     potassium chloride 10 mEq in 100 mL intermittent infusion with 10 mg lidocaine     potassium chloride 10 mEq in 100 mL sterile water intermittent infusion (premix)     potassium chloride 20 mEq in 50 mL intermittent infusion     potassium chloride SA (K-DUR/KLOR-CON M) CR tablet 20-40 mEq     propranolol (INDERAL LA) 24 hr capsule 80 mg     senna-docusate (SENOKOT-S;PERICOLACE) 8.6-50 MG per tablet 1 tablet    Or     senna-docusate (SENOKOT-S;PERICOLACE) 8.6-50 MG per tablet 2 tablet     sodium chloride 0.9% infusion     sodium chloride 0.9% infusion     tiZANidine (ZANAFLEX) tablet 4 mg     traMADol (ULTRAM) tablet 50 mg     zonisamide (Zonegran) capsule 75 mg        Allergies   Allergen Reactions     Bee Venom Anaphylaxis, Difficulty breathing, Palpitations, Shortness Of Breath and Visual Disturbance     Patient does carry Epi-Pen     No Clinical Screening - See Comments Anaphylaxis     Chicken-Derived Products (Egg) Diarrhea and Nausea     Patient will self monitor  Other reaction(s): Nausea  Patient will self monitor  Other reaction(s): GI intolerance     Compazine [Prochlorperazine] Other (See Comments)     Extreme jittery     Gabapentin      Gluten Meal Other (See Comments)     Topiramate      Wheat Diarrhea     Wheat Bran Nausea     Patient will self monitor  Other reaction(s): Nausea  Patient will self monitor       I have reviewed the Medications, Allergies, Past Medical and Surgical History, and Social  History in the Epic system.    Review of Systems   Neurological: Positive for headaches.   All other systems reviewed and are negative.      Physical Exam   BP: (!) 141/96  Pulse: 78  Temp: 98.2  F (36.8  C)  Resp: 16  Weight: 90.7 kg (200 lb)  SpO2: 96 %      Physical Exam   Constitutional: She is oriented to person, place, and time. She appears well-developed and well-nourished.  Non-toxic appearance. She does not appear ill. No distress.   Patient appears uncomfortable, lying in darkened room wearing sunglasses.   HENT:   Head: Normocephalic and atraumatic.   Eyes: EOM are normal. Pupils are equal, round, and reactive to light. No scleral icterus.   Neck: Normal range of motion. Neck supple.   No meningeal signs noted.   Cardiovascular: Normal rate.    Pulmonary/Chest: Effort normal. No respiratory distress.   Neurological: She is alert and oriented to person, place, and time. She has normal strength. She is not disoriented. No cranial nerve deficit or sensory deficit. Coordination normal. GCS eye subscore is 4. GCS verbal subscore is 5. GCS motor subscore is 6.   No aphasia or dysarthria noted.   Skin: Skin is warm and dry. No rash noted. No pallor.   Psychiatric: She has a normal mood and affect. Her behavior is normal.   Nursing note and vitals reviewed.      ED Course     ED Course     Procedures       9:35 PM  The patient was seen and examined by Dr. Matthew in Room 18.       Consults  Neurology: Responded (07/23/18 3985)    Assessments & Plan (with Medical Decision Making)     This patient presented to the emergency department with complaints of typical but constant and prolonged migraine. There is nothing in her history or on her physical exam to suggest more serious causes of headache such as subarachnoid hemorrhage, subdural or epidural hematoma, intracranial mass, increased intracranial pressure, meningitis or encephalitis, temporal arteritis, acute angle-closure glaucoma or venous sinus thrombosis. She  was treated with reglan and benadryl after consultation with neurology and had no resolution of her pain.  At this point time, plan is to admit to neurology for DHE protocol.    I have reviewed the nursing notes.    I have reviewed the findings, diagnosis, plan and need for follow up with the patient.    Current Discharge Medication List          Final diagnoses:   Migraine with intractable migraine, so stated, with status migrainosus   I, David Benito, am serving as a trained medical scribe to document services personally performed by Stef Matthew MD, based on the provider's statements to me.   I, Stef Matthew MD, was physically present and have reviewed and verified the accuracy of this note documented by David Benito.      7/23/2018   [facility], [place], EMERGENCY DEPARTMENT     Bob Matthew MD  07/24/18 1037

## 2018-07-24 NOTE — PLAN OF CARE
Problem: Patient Care Overview  Goal: Plan of Care/Patient Progress Review  Outcome: No Change  Pt is on 6A for migraines secondary to Tom Danlos syndrome. Pt is on DHE protocol for migraine pain management. VSS on RA. A&Ox4. Pt has photophobia, intermittent blurry vision, N/T to LLE, and neuropathy in feet. Pt received DHE infusion at 0930 this AM, after receiving Zofran; pt still felt nauseous from DHE treatment, but DHE did help dim the pain. Waiting for next dose to come up from pharmacy at this time. PRN tylenol, tramadol, and zanaflex given for headache as well. Pt on cardiac monitoring with normal sinus rhythm. PIV infusing NS 100mL/hr. Pt voids spont; no BM this shift. Up with SBA for line assistance. Continue to monitor and follow POC.

## 2018-07-24 NOTE — H&P
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  She was recently hospitalized for status migrainosus for DHE treatment, which reportedly helped her a little bit although did not completely get rid of her migraine.  She reports that since being seen in clinic on July 17 she has progressively worsened.  She has been persistently nauseous with vomiting and anorexia; states that she last ate a muffin this morning, and has not eaten anything else since.  Home interventions include amitriptyline, Tylenol, duloxetine, medical cannabis, metoclopramide, oxycodone, rizatriptan , tizanidine, zonisamide, sumatriptan, Zofran, tramadol.  These medications have given her any relief.  In the past she has received Depakote, occipital nerve block which did not show any benefit.  Will admit for DHE treatment of status migrainosus.    #Status migrainosus:  -IV fluids, Reglan as needed, Benadryl as needed  -Took a dose of Imitrex overnight on 7/22-7/23 we will plan to initiate DHE protocol in the morning  -Continue home amitriptyline, duloxetine, zonisamide  --I have discussed using her home medical cannabis and she has signed and agreed to the hospital's policies. She can continue this self-directed therapy.     #Chronic pain:  -Continue home pain regimen    #Hypothyroidism:  -Continue home levothyroxine    #GERD:  -Continue home famotidine    #Asthma:  -Continue home inhalers    Code: Full code  FEN: Normal saline at 100 mL/h, regular adult diet  Ppx: SCDs  Dispo: Likely will need to be inpatient for a few days    Patient discussed with   "Neeraj, attending.    Dandre Serra  PGY-3 Neurology             History of Present Illness:       CC: Migraine headache    History is obtained from the patient and/or family and medical record.      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  She was recently hospitalized from July 6 - July 9 for the same problem.  At that time she underwent DHE protocol and reports that she had some mild relief in her headache.  She states that \"I never knew how bad my headache was until I received DHE.\"  She reports that her headache has been constant since that time.  She followed up with Dr. Nguyen in clinic on July 17, where reportedly she was told that her headache would either break or get worse.  She states that since that time her headache has progressively gotten worse.    She describes her headache as a pounding stating \"it is like my heart is in my head\" there is localized largely in the occipital region.  States that it does radiate down to below her jawline.  She reports photophobia, phonophobia, nausea, vomiting, anorexia.  States that her headache is worse in bright lights and with any activity.  She has used her home medication regimen with limited success over the past few weeks.  She did take a stat Imitrex late at night on 7/22 which she states helped a little bit.  She notes that she is nauseous with vomiting and anorexia.  She last ate this morning when she ate a muffin, has been avoiding food since then.    She also has a number of other pain syndrome issues, including cervicalgia.  She is on chronic narcotic medications for these issues.           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     Chronic sinusitis 00/2007    Diagnosed with chronic pansinusitis 2016     Chronic tonsillitis 1980    Had tonsils removed 02/1981, rheumatic fever     Tom-Danlos disease      Gastroesophageal reflux disease 3/1/2017    I have large hiatal hernia     Hernia, abdominal 10/2016    Hiatal Hernia     Hiatal hernia 2016    Have adeline hiatel hernia     Hoarseness Unsure    It comes and goes     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     Migraines 1/1/2007    Unsure of exact date     Nasal polyps 2013    Were revoved 03/2017, benign     Other nervous system complications 1/2014    Autonomic nervous system disorder, neuralgia, neuropathy     Pneumonia Unsure    Have had pneumonia several times     Swelling, mass, or lump in head and neck 08/2016    Lymph node has been growing for last year     Thyroid disease 01/01/2008               Social History:     Social History     Social History     Marital status:      Spouse name: Carry     Number of children: 5     Years of education: N/A     Occupational History     Not on file.     Social History Main Topics     Smoking status: Former Smoker     Packs/day: 0.20     Years: 10.00     Types: Cigarettes     Start date: 1/1/1991     Quit date: 12/1/2013     Smokeless tobacco: Never Used      Comment: I somked on and off during specified dates.     Alcohol use Yes      Comment: Less than 5 drinks per year     Drug use: No     Sexual activity: Yes     Partners: Male     Birth control/ protection: Female Surgical     Other Topics Concern     Parent/Sibling W/ Cabg, Mi Or Angioplasty Before 65f 55m? No     Social History Narrative    5 kids: 24, 23, 19, 13, 5 yo     works at [employer] for disability                 Family History:     Family History   Problem Relation Age of Onset     Connective Tissue Disorder Mother       eds     Connective Tissue Disorder Sister      eds     Cancer Father      Spinal tumor grew into spinal cord, went in brain     Migraines Father      Diabetes Maternal Grandmother      Elderly diabetes     HEART DISEASE Maternal Grandmother      Hypertension Maternal Grandmother      Diabetes Paternal Grandmother      Elderly diabetes     Diabetes Paternal Grandfather      Elderly diabetes     Asthma Sister      Pediatric Asthma     Migraines Sister      Asthma Son      Pediatric Asthma     Thyroid Disease Sister      ,     Migraines Sister      Migraines Sister      Breast Cancer No family hx of             Allergies:      Allergies   Allergen Reactions     Bee Venom Anaphylaxis, Difficulty breathing, Palpitations, Shortness Of Breath and Visual Disturbance     Patient does carry Epi-Pen     No Clinical Screening - See Comments Anaphylaxis     Chicken-Derived Products (Egg) Diarrhea and Nausea     Patient will self monitor  Other reaction(s): Nausea  Patient will self monitor  Other reaction(s): GI intolerance     Compazine [Prochlorperazine] Other (See Comments)     Extreme jittery     Gabapentin      Gluten Meal Other (See Comments)     Topiramate      Wheat Diarrhea     Wheat Bran Nausea     Patient will self monitor  Other reaction(s): Nausea  Patient will self monitor             Medications:     Prescription Medications as of 7/23/2018             ACETAMINOPHEN PO Take 1,000 mg by mouth every 6 hours as needed for pain Take with tramadol    albuterol (PROAIR HFA/PROVENTIL HFA/VENTOLIN HFA) 108 (90 BASE) MCG/ACT Inhaler Inhale 2 puffs into the lungs every 6 hours as needed     AMITRIPTYLINE HCL PO Take 40 mg by mouth daily for two weeks, then increase to 50 mg daily.    buprenorphine (BUTRANS) 10 MCG/HR WK patch Place 1 patch onto the skin every 7 days    butalbital-acetaminophen-caffeine (FIORICET/ESGIC) -40 MG per tablet TAKE ONE TABLET BY MOUTH EVERY 4 HOURS AS NEEDED    cetirizine (ZYRTEC) 10 MG  tablet Take 10 mg by mouth daily     COMPRESSION STOCKINGS 1 each daily 20-30 mmHg Thigh Length measure and fit, color and style per patient preference. Antolin garcia per need.    DULOXETINE HCL PO Take 60 mg by mouth in the morning and 30 mg in the evening.    eletriptan (RELPAX) 20 MG tablet Take 1 tablet (20 mg) by mouth at onset of headache for migraine May repeat in 2 hours. Max 4 tablets/24 hours.    EPINEPHrine (EPIPEN/ADRENACLICK/OR ANY BX GENERIC EQUIV) 0.3 MG/0.3ML injection 2-pack Inject 0.3 mg into the muscle    famotidine (PEPCID) 20 MG tablet Take 1 tablet (20 mg) by mouth At Bedtime    fludrocortisone (FLORINEF) 0.1 MG tablet Take 2 tablets (0.2 mg) by mouth daily    fluticasone (FLONASE) 50 MCG/ACT spray INSTILL 2 SPRAYS INTO BOTH NOSTRILS DAILY    fluticasone-salmeterol (ADVAIR DISKUS) 250-50 MCG/DOSE diskus inhaler Inhale 1 puff into the lungs 2 times daily as needed     levothyroxine (SYNTHROID) 25 MCG tablet Take 25 mcg by mouth daily    linaclotide (LINZESS) 145 MCG capsule Take 1 capsule (145 mcg) by mouth every morning (before breakfast) Last refill until GI clinic follow up.    medical cannabis (Patient's own supply.  Not a prescription) Take 1 Dose by mouth See Admin Instructions (This is NOT a prescription, and does not certify that the patient has a qualifying medical condition for medical cannabis.  The purpose of this order is  to document that the patient reports taking medical cannabis.)    metoclopramide (REGLAN) 5 MG tablet Take 1-2 tablets (5-10 mg) by mouth every 6 hours as needed    Multiple Vitamins-Minerals (MULTIVITAMIN PO) Take 1 tablet by mouth daily     OLANZapine (ZYPREXA) 5 MG tablet Take 1 tablet (5 mg) by mouth daily as needed (headache pain, nausea)    omeprazole (PRILOSEC) 20 MG CR capsule Take 1 capsule (20 mg) by mouth daily    ondansetron (ZOFRAN-ODT) 4 MG ODT tab Take 1-2 tablets (4-8 mg) by mouth every 12 hours as needed for nausea    oxyCODONE IR (ROXICODONE) 5  MG tablet Take 0.5 tablets (2.5 mg) by mouth every 6 hours as needed for severe pain    Probiotic Product (PROBIOTIC DAILY PO) Take 2 packets by mouth every morning     propranolol (INDERAL LA) 80 MG 24 hr capsule     Propranolol HCl SR Beads 80 MG CP24 Take 80 mg by mouth daily    rizatriptan (MAXALT-MLT) 5 MG ODT tab TAKE 1-2 TABLETS (5-10 MG) BY MOUTH AT ONSET OF HEADACHE FOR MIGRAINE (MAX 30 MG IN 24 HOURS)    SUMAtriptan (IMITREX STATDOSE) 6 MG/0.5ML pen injector kit Inject 0.5 mLs (6 mg) Subcutaneous at onset of headache for migraine (may repeat once after 2 hrs) May repeat in 1 hour. Max 12 mg/24 hours.    tiZANidine (ZANAFLEX) 4 MG tablet Take 1 tablet (4 mg) by mouth 4 times daily as needed for muscle spasms    traMADol (ULTRAM) 50 MG tablet Take 1 tablet (50 mg) by mouth every 6 hours as needed for severe pain (Take scheduled every 6 hours for one week, then resume taking as needed)    VITAMIN D, CHOLECALCIFEROL, PO Take 2,000 Units by mouth daily    ZONISAMIDE PO Take 75 mg by mouth daily      Facility Administered Medications as of 7/23/2018             0.9% sodium chloride BOLUS Inject 1,000 mLs into the vein once    diphenhydrAMINE (BENADRYL) injection 50 mg Inject 1 mL (50 mg) into the vein once    metoclopramide (REGLAN) injection 10 mg Inject 2 mLs (10 mg) into the vein once                Review of Systems:   The Review of Systems is negative other than noted in the HPI         Physical Exam:   BP (!) 130/91  Pulse 80  Temp 98.2  F (36.8  C) (Oral)  Resp 16  Wt 90.7 kg (200 lb)  LMP 06/30/2018  SpO2 97%  BMI 31.8 kg/m2     General: Lying in fetal position on the bed in a dark room with sunglasses on  Resp: Breathing comfortably, no respiratory distress  CVS: Regular rate  Skin: warm and dry  Extremities: No edema    Neurologic:  Mental Status: Fully alert, attentive and oriented. Speech fluent without errors.   Cranial Nerves: Visual fields intact. PERRL. EOMI with normal smooth pursuit.  Facial movements symmetric. Hearing intact to conversation. Palate elevation symmetric, uvula midline. No dysarthria. Shoulder shrug strong bilaterally. Tongue protrusion midline.  Motor: No abnormal movements. Normal tone throughout. No pronator drift. Strength 5/5 throughout upper and lower extremities.  Reflexes: 2+ and symmetric throughout. No clonus. Toes mute.  Sensory: Intact/symmetric to light touch throughout upper and lower extremities.   Coordination: FNF intact without ataxia or dysmetria.   Station/Gait: Deferred            Data:   CBC:  Lab Results   Component Value Date    WBC 8.0 07/06/2018     Lab Results   Component Value Date    HGB 12.8 07/06/2018     Lab Results   Component Value Date    HCT 37.1 07/06/2018     Lab Results   Component Value Date     07/06/2018       Last Basic Metabolic Panel:  Lab Results   Component Value Date     07/06/2018      Lab Results   Component Value Date    POTASSIUM 3.8 07/06/2018     Lab Results   Component Value Date    CHLORIDE 104 07/06/2018     Lab Results   Component Value Date    JUAN 8.0 07/06/2018     Lab Results   Component Value Date    CO2 24 07/06/2018     Lab Results   Component Value Date    BUN 10 07/06/2018     Lab Results   Component Value Date    CR 0.83 07/06/2018     Lab Results   Component Value Date    GLC 92 07/06/2018       Head CT: Performed on 7/6 no acute intracranial pathology.    ATTENDING ADDENDUM: Pt seen and examined with resident Dr Chowdary during morning rounds. Discussed on the phone with resident Dr Serra last night and I agree with his assessment and plan as above except as amended herein. TT spent for patient care 15 minutes; more than half was counseling. Mrs Welch has intractable chronic migraine headache. She was referred by her PCP to ED last night for worsening headache, nausea, and episode of vomiting.I agreed with the resident on call to admit her last night and was thinking of DHE IV administration.  According to patient and previous notes, she was admitted 2 weeks ago for same purpose and had partial improvement after 2 days of DHE. However, when I spoke to Dr Ngueyn, her treating outpatient Neurologist, this morning, she did not feel that this treatment produced a meaningful benefit to the patient a few weeks ago, and recommended discharging patient unless she is vomiting/unable to hydrate herself. Later during the day we spoke to the patient again and she felt subjectively improved after the first dose of IV DHE was given at 8 am, 7/24. I think the most reasonable course of action, since she feels better, is to offer her 2 more doses and re-assess tomorrow morning. We all understand that DHE IV is not going to be the long term solution to her intractable headache problem. Jorge Pickard MD

## 2018-07-24 NOTE — ED NOTES
[facility], [place]   ED Nurse to Floor Handoff     Ada Welch is a 47 year old female who speaks English and lives with a spouse,  in a home  They arrived in the ED by car from home    ED Chief Complaint: Headache    ED Dx;   Final diagnoses:   Migraine with intractable migraine, so stated, with status migrainosus         Needed?: No    Allergies:   Allergies   Allergen Reactions     Bee Venom Anaphylaxis, Difficulty breathing, Palpitations, Shortness Of Breath and Visual Disturbance     Patient does carry Epi-Pen     No Clinical Screening - See Comments Anaphylaxis     Chicken-Derived Products (Egg) Diarrhea and Nausea     Patient will self monitor  Other reaction(s): Nausea  Patient will self monitor  Other reaction(s): GI intolerance     Compazine [Prochlorperazine] Other (See Comments)     Extreme jittery     Gabapentin      Gluten Meal Other (See Comments)     Topiramate      Wheat Diarrhea     Wheat Bran Nausea     Patient will self monitor  Other reaction(s): Nausea  Patient will self monitor   .  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     Chronic sinusitis 00/2007    Diagnosed with chronic pansinusitis 2016     Chronic tonsillitis 1980    Had tonsils removed 02/1981, rheumatic fever     Tom-Danlos disease      Gastroesophageal reflux disease 3/1/2017    I have large hiatal hernia     Hernia, abdominal 10/2016    Hiatal Hernia     Hiatal hernia 2016    Have adeline hiatel hernia     Hoarseness Unsure    It comes and goes     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     Migraines 1/1/2007    Unsure of exact date     Nasal polyps 2013    Were revoved  03/2017, benign     Other nervous system complications 1/2014    Autonomic nervous system disorder, neuralgia, neuropathy     Pneumonia Unsure    Have had pneumonia several times     Swelling, mass, or lump in head and neck 08/2016    Lymph node has been growing for last year     Thyroid disease 01/01/2008      Baseline Mental status: WDL  Current Mental Status changes: at basesline    Infection present or suspected this encounter: no  Sepsis suspected: No  Isolation type: No active isolations     Activity level - Baseline/Home:  Independent  Activity Level - Current:   Independent    Bariatric equipment needed?: No    In the ED these meds were given:   Medications   0.9% sodium chloride BOLUS (1,000 mLs Intravenous New Bag 7/23/18 2915)   metoclopramide (REGLAN) injection 10 mg (10 mg Intravenous Given 7/23/18 7103)   diphenhydrAMINE (BENADRYL) injection 50 mg (50 mg Intravenous Given 7/23/18 2335)       Drips running?  Yes- magnesium sulfate 2 g for her headache, sodium chloride 100 mL /hr    Home pump  No    Current LDAs  Peripheral IV 07/23/18 Right Lower forearm (Active)   Site Assessment WDL 7/23/2018 10:03 PM   Line Status Saline locked 7/23/2018 10:03 PM   Phlebitis Scale 0-->no symptoms 7/23/2018 10:03 PM   Infiltration Scale 0 7/23/2018 10:03 PM   Extravasation? No 7/23/2018 10:03 PM   Dressing Intervention New dressing  7/23/2018 10:03 PM   Number of days:0       Labs results: Labs Ordered and Resulted from Time of ED Arrival Up to the Time of Departure from the ED - No data to display    Imaging Studies: No results found for this or any previous visit (from the past 24 hour(s)).    Recent vital signs:   BP (!) 130/91  Pulse 80  Temp 98.2  F (36.8  C) (Oral)  Resp 16  Wt 90.7 kg (200 lb)  LMP 06/30/2018  SpO2 97%  BMI 31.8 kg/m2    Cardiac Rhythm: Other- sinus lucero on 7/7 is last EKG on file  Pt needs tele? Cardiac continuous monitoring  Skin/wound Issues: None reported    Code Status: Full  Code    Pain control: poor    Nausea control: fair    Abnormal labs/tests/findings requiring intervention: none    Family present during ED course? Yes   Family Comments/Social Situation comments: pt has had a headache for the past five weeks. She has been admitted to the hospital recently for a migraine and was sent home with the diagnosis of intractable migraine. She has an MRI scheduled for tomorrow (7/24), but was feeling worse today than ever. She is alert, oriented, reports numbness in her face (which is NOT new with her migraines), as well as tingling down her arms. She also has neck stiffness (both of these are not new). She has an 18G iv in the LLF. She wears dark glasses in the room and prefers a more quiet area if possible.    Tasks needing completion: None    Ana Joiner RN  [facility]-- [phone]  [phone] [facility] ED

## 2018-07-24 NOTE — PROGRESS NOTES
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  She was recently hospitalized for status migrainosus for DHE treatment, which reportedly helped her a little bit although did not completely get rid of her migraine. Her headache worsened again, prompting her presentation ot the ED. Home interventions include amitriptyline, Tylenol, duloxetine, medical cannabis, metoclopramide, oxycodone, rizatriptan , tizanidine, zonisamide, sumatriptan, Zofran, tramadol, and olanzapine. She has been able to maintain adequate hydration and caloric intake here without vomiting although appetite is reduced.     #Status migrainosus  Some relief with DHE x 1 per protocol (HA 8.5-9 dropped to 7.5 -8 in pain level) and would like to receive additional doses. Based on response may be able to discharge tomorrow. Discussed the likelihood that the DHE will completely resolve her headache, a sinemet held by Ms. Welch's primary neurologist, Dr. Nguyen. Re-evaluate tomorrow.   - Continue IV fluids 100 L/hr   - Zofran PRN   - Continue DHE protocol  - Continue home amitriptyline, duloxetine, zonisamide 75 mg daily, olanzapine PRN, propranolol extended release 80 mg daily     #Chronic Problems: continue home regimen  - Chronic pain: Continue tizanidine 4 mg QID PRN, tramadol 50 mg Q6h PRN,   -Continue roxicodone for severe pain   - Medical cannabis, per policy     - Hypothyroidism: levothyroxine 25 mcg daily   - GERD: famotidine  - Asthma: Continue fluticasone-vilanterol PRN   - Autonomic dysfunction: Continue TA fludrocortisone 0.2 mg daily        # Ppx: Ambulate  # FEN: Regular diet  # Code: Full    Written with the assistance of Jojo Gonzalez (Mac) MS4    Seema Chowdary MD  Neurology Resident PGY 2   [phone]     ATTENDING  ADDENDUM: Pt seen and examined with resident Dr Chowdary on 7/24/2018. Agree with her assessment and plan as above. Please see my addendum to Dr Serra's H&P for details. Jorge Pickard MD      Overnight:   No acute events overnight. Did not start DHE until this morning due to Imitrex use in the 24 hours prior to admission. Continue nausea without vomiting and poor oral intake. Was able to get some sleep.             Medications:     Current Facility-Administered Medications   Medication     acetaminophen (TYLENOL) tablet 1,000 mg     albuterol (PROAIR HFA/PROVENTIL HFA/VENTOLIN HFA) Inhaler 2 puff     amitriptyline (ELAVIL) tablet 50 mg     bisacodyl (DULCOLAX) Suppository 10 mg     DULoxetine (CYMBALTA) EC capsule 30 mg     DULoxetine (CYMBALTA) EC capsule 60 mg     famotidine (PEPCID) tablet 20 mg     fludrocortisone (FLORINEF) tablet 0.2 mg     fluticasone-vilanterol (BREO ELLIPTA) 200-25 MCG/INH oral inhaler 1 puff     levothyroxine (SYNTHROID/LEVOTHROID) tablet 25 mcg     magnesium sulfate 4 g in 100 mL sterile water (premade)     medical cannabis self-directed use allowed by Medical Cannabis Policy     melatonin tablet 1 mg     multivitamin, therapeutic with minerals (THERA-VIT-M) tablet 1 tablet     naloxone (NARCAN) injection 0.1-0.4 mg     OLANZapine (zyPREXA) tablet 5 mg     ondansetron (ZOFRAN) injection 4 mg     oxyCODONE IR (ROXICODONE) half-tab 2.5 mg     polyethylene glycol (MIRALAX/GLYCOLAX) Packet 17 g     potassium chloride (KLOR-CON) Packet 20-40 mEq     potassium chloride 10 mEq in 100 mL intermittent infusion with 10 mg lidocaine     potassium chloride 10 mEq in 100 mL sterile water intermittent infusion (premix)     potassium chloride 20 mEq in 50 mL intermittent infusion     potassium chloride SA (K-DUR/KLOR-CON M) CR tablet 20-40 mEq     propranolol (INDERAL LA) 24 hr capsule 80 mg     senna-docusate (SENOKOT-S;PERICOLACE) 8.6-50 MG per tablet 1 tablet    Or     senna-docusate  (SENOKOT-S;PERICOLACE) 8.6-50 MG per tablet 2 tablet     sodium chloride 0.9% infusion     sodium chloride 0.9% infusion     tiZANidine (ZANAFLEX) tablet 4 mg     traMADol (ULTRAM) tablet 50 mg     zonisamide (Zonegran) capsule 75 mg     Exam     /73  Pulse 64  Temp 98.4  F (36.9  C) (Oral)  Resp 16  Wt 93.4 kg (206 lb)  LMP 06/30/2018  SpO2 98%  BMI 32.75 kg/m2    General: Lying in fetal position on the bed in a dark room with sunglasses on; improved later in day with glasses off, talking on phone  Resp: Breathing comfortably, no respiratory distress  CVS: Regular rate  Skin: warm and dry  Extremities: No edema     Neurologic:  Mental Status: Fully alert, attentive and oriented. Speech fluent without errors.   Cranial Nerves: Visual fields intact. PERRL. EOMI with normal smooth pursuit. Facial movements symmetric. Hearing intact to conversation. Palate elevation symmetric, uvula midline. No dysarthria. Shoulder shrug strong bilaterally. Tongue protrusion midline.  Motor: No abnormal movements. Normal tone throughout. No pronator drift. Strength 5/5 throughout upper and lower extremities.  Reflexes: 2+ and symmetric throughout. No clonus. Toes mute.  Sensory: Intact/symmetric to light touch throughout upper and lower extremities.   Coordination: FNF intact without ataxia or dysmetria.   Station/Gait: Deferred          Data:     Results for orders placed or performed during the hospital encounter of 07/23/18 (from the past 24 hour(s))   UA with Microscopic reflex to Culture   Result Value Ref Range    Color Urine Yellow     Appearance Urine Slightly Cloudy     Glucose Urine Negative NEG^Negative mg/dL    Bilirubin Urine Negative NEG^Negative    Ketones Urine Negative NEG^Negative mg/dL    Specific Gravity Urine 1.011 1.003 - 1.035    Blood Urine Trace (A) NEG^Negative    pH Urine 5.5 5.0 - 7.0 pH    Protein Albumin Urine Negative NEG^Negative mg/dL    Urobilinogen mg/dL Normal 0.0 - 2.0 mg/dL    Nitrite  Urine Negative NEG^Negative    Leukocyte Esterase Urine Negative NEG^Negative    Source Midstream Urine     WBC Urine 1 0 - 5 /HPF    RBC Urine 1 0 - 2 /HPF    Squamous Epithelial /HPF Urine 2 (H) 0 - 1 /HPF    Transitional Epi <1 0 - 1 /HPF    Mucous Urine Present (A) NEG^Negative /LPF   Basic metabolic panel   Result Value Ref Range    Sodium 139 133 - 144 mmol/L    Potassium 3.8 3.4 - 5.3 mmol/L    Chloride 109 94 - 109 mmol/L    Carbon Dioxide 23 20 - 32 mmol/L    Anion Gap 7 3 - 14 mmol/L    Glucose 97 70 - 99 mg/dL    Urea Nitrogen 9 7 - 30 mg/dL    Creatinine 0.85 0.52 - 1.04 mg/dL    GFR Estimate 72 >60 mL/min/1.7m2    GFR Estimate If Black 87 >60 mL/min/1.7m2    Calcium 7.8 (L) 8.5 - 10.1 mg/dL   CBC with platelets   Result Value Ref Range    WBC 5.5 4.0 - 11.0 10e9/L    RBC Count 4.14 3.8 - 5.2 10e12/L    Hemoglobin 12.1 11.7 - 15.7 g/dL    Hematocrit 35.9 35.0 - 47.0 %    MCV 87 78 - 100 fl    MCH 29.2 26.5 - 33.0 pg    MCHC 33.7 31.5 - 36.5 g/dL    RDW 13.1 10.0 - 15.0 %    Platelet Count 231 150 - 450 10e9/L   EKG 12-lead, tracing only   Result Value Ref Range    Interpretation ECG Click View Image link to view waveform and result

## 2018-07-24 NOTE — TELEPHONE ENCOUNTER
Patient with history of night time urgency with incontinence coming in for 6 month follow up. Patient chart reviewed, no need for call, all records available and ready for appointment.

## 2018-07-24 NOTE — PHARMACY
Medical Cannabis: Registration in the [facility] Medical Cannabis Registry is confirmed.     Medical Cannabis visually inspected and does NOT appear obviously adulterated.    Oanh Camacho, pharmD, BCPS

## 2018-07-24 NOTE — ED TRIAGE NOTES
Patient is here with a migraine headache that has lasted about 5 weeks but has worsened recently. Was seen today by her doctor and told to seek treatment in the ER. Is having blurry vision, nausea and neck pain.

## 2018-07-24 NOTE — PROGRESS NOTES
"CLINICAL NUTRITION SERVICES - ASSESSMENT NOTE     Nutrition Prescription    RECOMMENDATIONS FOR MDs/PROVIDERS TO ORDER:  - Continue anti-nausea medications to help alleviate symptoms associated with decreased PO. Adjust as indicated.     Malnutrition Status:    - Unable to determine due to unable to determine all parameters of NFPA.     Recommendations already ordered by Registered Dietitian (RD):  - Ordered MVI per staff recommendations  - Ordered Orange Magic Cups between meals TID per pt preference.     Future/Additional Recommendations:  - PO/supplement tolerance      REASON FOR ASSESSMENT  Ada Welch is a/an 47 year old female assessed by the dietitian for Admission Nutrition Risk Screen for reduced oral intake over the last month    Medical history: Pt immunosppressed with a history of Tom-Danlos disease c/b chronic pain/migraines/TMJ, chronic pansinusitis, allergic rhinitis, and GERD who was sent in by her clinic for evaluation of a headache.    NUTRITION HISTORY  Ate muffin yesterday am and nothing much thereafter. Pt has persistent N/V. Per pt, tolerated lepe and cream of rice this am. Usually intake and appetite is usually stable and pt loosely follows \"keto diet\" at home with \"cheat days.\"  Pt takes MVI, vitamin D, magnesium, and a probiotic. Per pt, no recent weight loss despite prolonged decreased intake. Pt eats small, frequent meals at baseline and reports also doing while nauseated. Denies current vomiting, nausea still present.     CURRENT NUTRITION ORDERS  Diet: Regular  Intake/Tolerance: tolerating this am. Small intake.     LABS  Labs reviewed    MEDICATIONS  Medications reviewed  Reglan  IVF @ 100 ml/hr     ANTHROPOMETRICS  Height: 0 cm (Data Unavailable)   Ht Readings from Last 2 Encounters:   07/17/18 1.689 m (5' 6.5\")   07/17/18 1.676 m (5' 6\")   66.5\"  Most Recent Weight: 93.4 kg (206 lb)    IBW: 60 kg  BMI: Obesity Grade I BMI 30-34.9  Weight History:   Wt Readings from Last 9 " Encounters:   07/24/18 93.4 kg (206 lb)   07/17/18 92.1 kg (203 lb)   07/17/18 93.6 kg (206 lb 4.8 oz)   07/16/18 93 kg (205 lb)   07/07/18 94.9 kg (209 lb 3.5 oz)   06/29/18 91.2 kg (201 lb 1 oz)   06/29/18 91.2 kg (201 lb)   06/07/18 91 kg (200 lb 9.6 oz)   06/04/18 89.8 kg (198 lb)     Dosing Weight: 68 kg (adjusted weight based on current weight of 93 kg and IBW of 60 kg)    ASSESSED NUTRITION NEEDS  Estimated Energy Needs: 6306-5856 kcals/day (25 - 30 kcals/kg)  Justification: Maintenance  Estimated Protein Needs: 68-82 grams protein/day (1 - 1.2 grams of pro/kg)  Justification: Maintenance  Estimated Fluid Needs: 0344-6480 mL/day (1 mL/kcal)   Justification: Maintenance and Per provider pending fluid status    PHYSICAL FINDINGS  See malnutrition section below.     MALNUTRITION  % Intake: < 75% for >/= 1 month (non-severe)  % Weight Loss: None noted  Subcutaneous Fat Loss: Unable to assess  Muscle Loss: Unable to assess  Fluid Accumulation/Edema: Unable to assess  Malnutrition Diagnosis: Unable to determine due to unable to determine all parameters of NFPA.     Pt resting upon visit and preferred to continue to rest, thus detailed assessment deferred. Pt did not appear to have any obvious s/s of malnutrition upon superficial observation.     NUTRITION DIAGNOSIS  Inadequate oral intake related to decreased appetite, N/V, HA as evidenced by decreased PO x 1 + month PTA and at present.       INTERVENTIONS  Implementation  Nutrition Education: Provided education on role of RD, supplements, tips for N/V--small, frequent meals.    Medical food supplement therapy  Multi-trace element supplement therapy     Goals  Patient to consume % of nutritionally adequate meal trays TID, or the equivalent with supplements/snacks.     Monitoring/Evaluation  Progress toward goals will be monitored and evaluated per protocol.     Gertrude Shirley RD, LD, [facility]  Neuro ICU  Pager: [phone]

## 2018-07-24 NOTE — PLAN OF CARE
Problem: Patient Care Overview  Goal: Plan of Care/Patient Progress Review  Outcome: No Change  Status: Pt new admit overnight for migraine. Pt was recently here on 7/6-7/9 migraine and DHE protocol    VS: VSS on RA. On CCM showing NSR   Neuros: Migraine/HA with photophobia and intermittent nausea. N/T to face, more significant on L side. Blurry vision. Neuropathy in feet.   GI: Regular diet. Denied nausea (intermittent).   : Voiding spontaneously, UA/UC sent ?  IV: PIV infusing NS at 100 ml/hr  Activity: Up with SBA  Pain: Pt receiving multiple medications for chronic pain and migraine.   Social: Home medications sent to security. Medical cannabis protocol completed. Lock box at bedside.     Plan of care: Plan for DHE administration this AM. Continue to monitor and follow POC

## 2018-07-24 NOTE — ED NOTES
Pt reports facial numbness on both sides of her cheeks when she has migraines. She has tingling down her arms and into her hands also. She has neck stiffness also

## 2018-07-25 ENCOUNTER — HOSPITAL ENCOUNTER (INPATIENT)
Dept: MRI IMAGING | Facility: CLINIC | Age: 48
End: 2018-07-25
Attending: PSYCHIATRY & NEUROLOGY
Payer: COMMERCIAL

## 2018-07-25 VITALS
DIASTOLIC BLOOD PRESSURE: 70 MMHG | RESPIRATION RATE: 20 BRPM | HEART RATE: 55 BPM | TEMPERATURE: 98.1 F | WEIGHT: 206 LBS | SYSTOLIC BLOOD PRESSURE: 118 MMHG | OXYGEN SATURATION: 98 % | BODY MASS INDEX: 32.75 KG/M2

## 2018-07-25 DIAGNOSIS — R47.81 SLURRED SPEECH: ICD-10-CM

## 2018-07-25 DIAGNOSIS — H53.2 DIPLOPIA: ICD-10-CM

## 2018-07-25 DIAGNOSIS — Q79.60 EHLERS-DANLOS SYNDROME: ICD-10-CM

## 2018-07-25 DIAGNOSIS — G43.719 INTRACTABLE CHRONIC MIGRAINE WITHOUT AURA AND WITHOUT STATUS MIGRAINOSUS: ICD-10-CM

## 2018-07-25 LAB — INTERPRETATION ECG - MUSE: NORMAL

## 2018-07-25 PROCEDURE — 25000132 ZZH RX MED GY IP 250 OP 250 PS 637: Performed by: PSYCHIATRY & NEUROLOGY

## 2018-07-25 PROCEDURE — A9585 GADOBUTROL INJECTION: HCPCS | Performed by: PSYCHIATRY & NEUROLOGY

## 2018-07-25 PROCEDURE — 25000128 H RX IP 250 OP 636: Performed by: STUDENT IN AN ORGANIZED HEALTH CARE EDUCATION/TRAINING PROGRAM

## 2018-07-25 PROCEDURE — 25000132 ZZH RX MED GY IP 250 OP 250 PS 637: Performed by: STUDENT IN AN ORGANIZED HEALTH CARE EDUCATION/TRAINING PROGRAM

## 2018-07-25 PROCEDURE — 70548 MR ANGIOGRAPHY NECK W/DYE: CPT

## 2018-07-25 PROCEDURE — 70553 MRI BRAIN STEM W/O & W/DYE: CPT

## 2018-07-25 PROCEDURE — 40000556 ZZH STATISTIC PERIPHERAL IV START W US GUIDANCE

## 2018-07-25 PROCEDURE — 70544 MR ANGIOGRAPHY HEAD W/O DYE: CPT | Mod: XS

## 2018-07-25 PROCEDURE — 25000128 H RX IP 250 OP 636: Performed by: PSYCHIATRY & NEUROLOGY

## 2018-07-25 RX ORDER — GADOBUTROL 604.72 MG/ML
10 INJECTION INTRAVENOUS ONCE
Status: COMPLETED | OUTPATIENT
Start: 2018-07-25 | End: 2018-07-25

## 2018-07-25 RX ADMIN — TIZANIDINE 4 MG: 4 TABLET ORAL at 08:41

## 2018-07-25 RX ADMIN — GADOBUTROL 10 ML: 604.72 INJECTION INTRAVENOUS at 12:54

## 2018-07-25 RX ADMIN — ACETAMINOPHEN 1000 MG: 500 TABLET, FILM COATED ORAL at 12:37

## 2018-07-25 RX ADMIN — LEVOTHYROXINE SODIUM 25 MCG: 25 TABLET ORAL at 08:41

## 2018-07-25 RX ADMIN — METOCLOPRAMIDE 10 MG: 5 INJECTION, SOLUTION INTRAMUSCULAR; INTRAVENOUS at 14:45

## 2018-07-25 RX ADMIN — TIZANIDINE 4 MG: 4 TABLET ORAL at 12:38

## 2018-07-25 RX ADMIN — PROPRANOLOL HYDROCHLORIDE 80 MG: 80 CAPSULE, EXTENDED RELEASE ORAL at 08:36

## 2018-07-25 RX ADMIN — DIHYDROERGOTAMINE MESYLATE 1 MG: 1 INJECTION, SOLUTION INTRAMUSCULAR; INTRAVENOUS; SUBCUTANEOUS at 15:19

## 2018-07-25 RX ADMIN — TRAMADOL HYDROCHLORIDE 50 MG: 50 TABLET, COATED ORAL at 12:37

## 2018-07-25 RX ADMIN — DIHYDROERGOTAMINE MESYLATE 0.5 MG: 1 INJECTION, SOLUTION INTRAMUSCULAR; INTRAVENOUS; SUBCUTANEOUS at 05:33

## 2018-07-25 RX ADMIN — DULOXETINE HYDROCHLORIDE 60 MG: 60 CAPSULE, DELAYED RELEASE ORAL at 08:36

## 2018-07-25 RX ADMIN — ZONISAMIDE 75 MG: 50 CAPSULE ORAL at 08:35

## 2018-07-25 RX ADMIN — MULTIPLE VITAMINS W/ MINERALS TAB 1 TABLET: TAB at 08:35

## 2018-07-25 RX ADMIN — TRAMADOL HYDROCHLORIDE 50 MG: 50 TABLET, COATED ORAL at 02:10

## 2018-07-25 RX ADMIN — TIZANIDINE 4 MG: 4 TABLET ORAL at 02:10

## 2018-07-25 RX ADMIN — METOCLOPRAMIDE 10 MG: 5 INJECTION, SOLUTION INTRAMUSCULAR; INTRAVENOUS at 05:07

## 2018-07-25 ASSESSMENT — VISUAL ACUITY
OU: DOUBLE VISION/DIPLOPIA
OU: BLURRED VISION;DOUBLE VISION/DIPLOPIA

## 2018-07-25 ASSESSMENT — ACTIVITIES OF DAILY LIVING (ADL)
ADLS_ACUITY_SCORE: 9

## 2018-07-25 NOTE — PLAN OF CARE
Problem: Patient Care Overview  Goal: Plan of Care/Patient Progress Review  Outcome: No Change    Status: Pt on 6A with migraine, receiving DHE treatment.   VS: VSS except lucero at times, which is baseline for the pt. On CCM, NSR-sinus lucero.    Neuros: Intact except intermittent photophobia and blurred vision, depending on migraine.  GI: C/o nausea with DHE which she gets Reglan for as a DHE pre-med, with reported improvement. On regular diet with fair tolerance.    : Voiding spontaneously in good amounts.   IV: PIV SL.   Activity: Up independently, just needs help getting unhooked from CCM.   Pain: C/o migraine pain, which has improved a lot since starting DHE.    Skin: Intact.       Plan of care: Continue to monitor and follow current POC. Plan for MRI possibly today. Continue wit DHE protocol.

## 2018-07-25 NOTE — PLAN OF CARE
Problem: Patient Care Overview  Goal: Plan of Care/Patient Progress Review  Outcome: Improving  HR lucero at times otherwise VSS. Alert and oriented x 4. C/O double vision and neuropathy otherwise neuros intact. Pt states her migraines has gotten better since starting DHE. Pain also managed with tramadol, tizanidine, and tylenol. MRI completed. Waiting for new PIV to be placed then will administer last dose of DHE and pt should DC to home. Cont cardiac monitoring shows sinus lucero. Up independently in rooms and halls. Voiding spont.

## 2018-07-25 NOTE — DISCHARGE SUMMARY
[facility], [place]    Neurology Discharge Summary    Date of Admission: 7/23/2018  Date of Discharge: July 25, 2018    Disposition: Home  Primary Care Physician: Ellyn Rivera     Admission Diagnosis:   Status migrainosus  Chronic pain   Hypothyroidism  GERD  Asthma      Discharge Diagnosis:   Status migrainosus  Chronic pain   Hypothyroidism  GERD  Asthma      Consults:  None     Problem Leading to Hospitalization (from hospitals):   Ms. Welch is a 47 y.o. 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 At time of current presentation, she has been experiencing a constant 8-9/10 pain rated headache for approximately 1.5 months. On last admission she received 1 dose DHE with some mild subjective improvement and followed up with Dr. Nguyen in outpatient clinic in 7/17/18. She returned to ED with worsening headache since visit with Dr. Nguyen with symptoms of nausea, vomiting and anorexia.     Please see H&P dated 7/23/2018 for further details about presentation.    Brief Hospital Course:   Ms. Welch received IV fluids, reglan, benadryl and 4 doses of DHE in addition to home medications of amitriptyline, zonisamide, propranolol, and self directed medical marijuana. She reported subjective improvement after DHE with headache 5.5-6 on a scale of 1/10. She was able to undergo her MRI imaging ordered by Dr. Nguyen while inpatient. On review of imaging, there was evidence of stable cerebellar tonsillar ectopia. The neurology team and discussed the possibility of low pressure headache to CSF as she endorsed a positional component to headache and Tom Danlos increase predisposition to CSF leak. Imaging was reviewed with radiology who felt that there was not significant evidence of low pressure on the repeat imaging. Otherwise no significant findings on MRA neck, MRI  brain angiogram and MRI brain. On discharge she will follow-up with Dr. Nguyen. She was informed that her medication overuse and narcotic medications with compromise complete resolution of her headache. No changes to her medications were made at time of discharge.     PERTINENT INVESTIGATIONS    Labs  Lab Results   Component Value Date    WBC 5.5 07/24/2018     Lab Results   Component Value Date    RBC 4.14 07/24/2018     Lab Results   Component Value Date    HGB 12.1 07/24/2018     Lab Results   Component Value Date    HCT 35.9 07/24/2018     No components found for: MCT  Lab Results   Component Value Date    MCV 87 07/24/2018     Lab Results   Component Value Date    MCH 29.2 07/24/2018     Lab Results   Component Value Date    MCHC 33.7 07/24/2018     Lab Results   Component Value Date    RDW 13.1 07/24/2018     Lab Results   Component Value Date     07/24/2018     Last Basic Metabolic Panel:  Lab Results   Component Value Date     07/24/2018      Lab Results   Component Value Date    POTASSIUM 3.8 07/24/2018     Lab Results   Component Value Date    CHLORIDE 109 07/24/2018     Lab Results   Component Value Date    JUAN 7.8 07/24/2018     Lab Results   Component Value Date    CO2 23 07/24/2018     Lab Results   Component Value Date    BUN 9 07/24/2018     Lab Results   Component Value Date    CR 0.85 07/24/2018     Lab Results   Component Value Date    GLC 97 07/24/2018       PHYSICAL EXAMINATION  Constitutional: Some discomfort, but no longer wearing sunglasses.   Head: atraumatic, anicteric. See neuroexam  Eyes: see neuroexam  Lung: No respiratory distress.   Extremities: Well perfused.  Psychiatric: Pleasant. Mood and affect congruent. Cooperative. Thought processes linear.     Neurologic:   Mental Status: Fully alert, attentive and oriented. Speech fluent without asphasia and comprehension intact. Recent and remote memory intact.   Cranial Nerves: Visual fields intact. PERRL with ongoing light  sensitivity, improved from presentation. EOMI with normal smooth pursuit. Facial movements symmetric. Hearing intact to conversation. Palate elevation symmetric, uvula midline. No dysarthria. Shoulder shrug strong bilaterally. Tongue protrusion midline.  Motor: No abnormal movements. Normal tone throughout. Strength intact.   Reflexes: 2+ and symmetric throughout. No clonus. Toes mute.  Sensory: Intact/symmetric to light touch throughout upper and lower extremities.   Station/Gait: Deferred    Additional recommendations and follow up:       Review of your medicines      CONTINUE these medicines which have NOT CHANGED       Dose / Directions    ACETAMINOPHEN PO        Dose:  1000 mg   Take 1,000 mg by mouth every 6 hours as needed for pain Take with tramadol   Refills:  0       ADVAIR DISKUS 250-50 MCG/DOSE diskus inhaler   Generic drug:  fluticasone-salmeterol        Dose:  1 puff   Inhale 1 puff into the lungs 2 times daily as needed   Refills:  0       albuterol 108 (90 Base) MCG/ACT Inhaler   Commonly known as:  PROAIR HFA/PROVENTIL HFA/VENTOLIN HFA        Dose:  2 puff   Inhale 2 puffs into the lungs every 6 hours as needed   Refills:  0       AMITRIPTYLINE HCL PO        Take 40 mg by mouth daily for two weeks, then increase to 50 mg daily.   Refills:  0       buprenorphine 10 MCG/HR WK patch   Commonly known as:  BUTRANS   Used for:  Postlaminectomy syndrome of lumbar region        Dose:  1 patch   Place 1 patch onto the skin every 7 days   Quantity:  4 patch   Refills:  2       butalbital-acetaminophen-caffeine -40 MG per tablet   Commonly known as:  FIORICET/ESGIC        TAKE ONE TABLET BY MOUTH EVERY 4 HOURS AS NEEDED   Refills:  0       cetirizine 10 MG tablet   Commonly known as:  zyrTEC        Dose:  10 mg   Take 10 mg by mouth daily   Refills:  0       COMPRESSION STOCKINGS   Used for:  Orthostatic hypotension        Dose:  1 each   1 each daily 20-30 mmHg Thigh Length measure and fit, color and  style per patient preference. Antolin garcia per need.   Quantity:  3 each   Refills:  3       DULOXETINE HCL PO        Take 60 mg by mouth in the morning and 30 mg in the evening.   Refills:  0       eletriptan 20 MG tablet   Commonly known as:  RELPAX   Used for:  Chronic daily headache, Intractable migraine without aura and with status migrainosus        Dose:  20 mg   Take 1 tablet (20 mg) by mouth at onset of headache for migraine May repeat in 2 hours. Max 4 tablets/24 hours.   Quantity:  18 tablet   Refills:  1       EPINEPHrine 0.3 MG/0.3ML injection 2-pack   Commonly known as:  EPIPEN/ADRENACLICK/or ANY BX GENERIC EQUIV        Dose:  0.3 mg   Inject 0.3 mg into the muscle   Refills:  0       famotidine 20 MG tablet   Commonly known as:  PEPCID   Used for:  Hiatal hernia, Mast cell disorder        Dose:  20 mg   Take 1 tablet (20 mg) by mouth At Bedtime   Quantity:  90 tablet   Refills:  1       fludrocortisone 0.1 MG tablet   Commonly known as:  FLORINEF   Used for:  Autonomic dysfunction, Pre-syncope        Dose:  0.2 mg   Take 2 tablets (0.2 mg) by mouth daily   Quantity:  180 tablet   Refills:  3       fluticasone 50 MCG/ACT spray   Commonly known as:  FLONASE   Used for:  Chronic rhinitis, unspecified type        INSTILL 2 SPRAYS INTO BOTH NOSTRILS DAILY   Quantity:  48 mL   Refills:  3       linaclotide 145 MCG capsule   Commonly known as:  LINZESS   Used for:  Chronic idiopathic constipation        Dose:  145 mcg   Take 1 capsule (145 mcg) by mouth every morning (before breakfast) Last refill until GI clinic follow up.   Quantity:  30 capsule   Refills:  0       medical cannabis (Patient's own supply.  Not a prescription)        Dose:  1 Dose   Take 1 Dose by mouth See Admin Instructions (This is NOT a prescription, and does not certify that the patient has a qualifying medical condition for medical cannabis.  The purpose of this order is  to document that the patient reports taking medical cannabis.)    Refills:  0       metoclopramide 5 MG tablet   Commonly known as:  REGLAN   Used for:  Intractable chronic migraine without aura and without status migrainosus        Dose:  5-10 mg   Take 1-2 tablets (5-10 mg) by mouth every 6 hours as needed   Quantity:  180 tablet   Refills:  1       MULTIVITAMIN PO        Dose:  1 tablet   Take 1 tablet by mouth daily   Refills:  0       OLANZapine 5 MG tablet   Commonly known as:  zyPREXA   Used for:  Other migraine with status migrainosus, intractable, Intractable chronic migraine without aura and without status migrainosus        Dose:  5 mg   Take 1 tablet (5 mg) by mouth daily as needed (headache pain, nausea)   Quantity:  30 tablet   Refills:  3       omeprazole 20 MG CR capsule   Commonly known as:  priLOSEC   Used for:  Hiatal hernia        Dose:  20 mg   Take 1 capsule (20 mg) by mouth daily   Quantity:  90 capsule   Refills:  2       ondansetron 4 MG ODT tab   Commonly known as:  ZOFRAN-ODT   Used for:  Migraine without status migrainosus, not intractable, unspecified migraine type        Dose:  4-8 mg   Take 1-2 tablets (4-8 mg) by mouth every 12 hours as needed for nausea   Quantity:  60 tablet   Refills:  1       oxyCODONE IR 5 MG tablet   Commonly known as:  ROXICODONE   Used for:  Postlaminectomy syndrome        Dose:  2.5 mg   Take 0.5 tablets (2.5 mg) by mouth every 6 hours as needed for severe pain   Quantity:  15 tablet   Refills:  0       PROBIOTIC DAILY PO        Dose:  2 packet   Take 2 packets by mouth every morning   Refills:  0       Propranolol HCl SR Beads 80 MG Cp24   Used for:  Sinus tachycardia        Dose:  80 mg   Take 80 mg by mouth daily   Quantity:  90 capsule   Refills:  3       rizatriptan 5 MG ODT tab   Commonly known as:  MAXALT-MLT        TAKE 1-2 TABLETS (5-10 MG) BY MOUTH AT ONSET OF HEADACHE FOR MIGRAINE (MAX 30 MG IN 24 HOURS)   Refills:  6       SUMAtriptan 6 MG/0.5ML pen injector kit   Commonly known as:  IMITREX STATDOSE   Used  for:  Chronic migraine without aura without status migrainosus, not intractable        Dose:  6 mg   Inject 0.5 mLs (6 mg) Subcutaneous at onset of headache for migraine (may repeat once after 2 hrs) May repeat in 1 hour. Max 12 mg/24 hours.   Quantity:  2 kit   Refills:  1       SYNTHROID 25 MCG tablet   Generic drug:  levothyroxine        Dose:  25 mcg   Take 25 mcg by mouth daily   Refills:  0       tiZANidine 4 MG tablet   Commonly known as:  ZANAFLEX        Dose:  4 mg   Take 1 tablet (4 mg) by mouth 4 times daily as needed for muscle spasms   Quantity:  120 tablet   Refills:  3       traMADol 50 MG tablet   Commonly known as:  ULTRAM   Used for:  Postlaminectomy syndrome        Dose:  50 mg   Take 1 tablet (50 mg) by mouth every 6 hours as needed for severe pain (Take scheduled every 6 hours for one week, then resume taking as needed)   Quantity:  60 tablet   Refills:  0       VITAMIN D (CHOLECALCIFEROL) PO        Dose:  2000 Units   Take 2,000 Units by mouth daily   Refills:  0       ZONISAMIDE PO        Dose:  75 mg   Take 75 mg by mouth daily   Refills:  0         STOP taking          propranolol 80 MG 24 hr capsule   Commonly known as:  INDERAL LA                Where to get your medicines      These medications were sent to [place] [address] [place] [facility] [place] [facility] [address] [facility]  [address] [place] [address]     Phone:  [phone]      linaclotide 145 MCG capsule             Reason for your hospital stay   You were hospitalized for intractable migraine headache.     Adult [facility] Follow-up and recommended labs and tests   Follow-up with Dr. Nguyen of neurology     Appointments on [place] and/or [facility] (with [facility] or [facility] provider or service). Call [phone] if you haven't heard regarding these appointments within 7 days of discharge.     Activity   Your activity upon discharge: activity as tolerated     Discharge Instructions   You were hospitalized for intractable migraine headache  and received DHE while here for 4 total doses with improvement in headache. Please note, do NOT take any triptan medication within 24 hrs of the last dose because this can increase risk for vasospasm/stroke. Limit triptan medications to 1 x weekly.  - Try to limit narcotic medications as this will likely make headache resolution incomplete.   - Follow-up with Dr. Nguyen outpatient     Full Code     Diet   Follow this diet upon discharge: Orders Placed This Encounter     Snacks/Supplements Adult: Magic Cup; Between Meals     Combination Diet Regular Diet Adult       Patient was seen and discussed with attending physician Dr. Pickard.    Seema Chowdary  PGY2 Neurology  [phone]    ATTENDING ADDENDUM: Pt seen and examined with Dr Chowdary on the day of discharge. Agree with above discharge summary. DC planning <30 minutes. Diagnosis is intractable migraine headache. Patient showed partial improvement after DHE. MRI/A did not show a lesion that would suggest an alternative diagnosis for her headaches; there was stable tonsillar ectopia but no aneurysm, dissection, vascular malformation or definite signs of intracranial hypotension. She will follow with Dr Nguyen in Clinic. She understands that in the future admissions should be avoided unless she is vomiting and cannot hydrate herself during a headache. Jorge Pickard MD

## 2018-07-26 ENCOUNTER — TELEPHONE (OUTPATIENT)
Dept: NEUROLOGY | Facility: CLINIC | Age: 48
End: 2018-07-26

## 2018-07-26 ENCOUNTER — CARE COORDINATION (OUTPATIENT)
Dept: OTOLARYNGOLOGY | Facility: CLINIC | Age: 48
End: 2018-07-26

## 2018-07-26 ENCOUNTER — CARE COORDINATION (OUTPATIENT)
Dept: CARE COORDINATION | Facility: CLINIC | Age: 48
End: 2018-07-26

## 2018-07-26 DIAGNOSIS — R59.1 LYMPHADENOPATHY: Primary | ICD-10-CM

## 2018-07-26 NOTE — PLAN OF CARE
Problem: Pain, Acute (Adult)  Goal: Identify Related Risk Factors and Signs and Symptoms  Related risk factors and signs and symptoms are identified upon initiation of Human Response Clinical Practice Guideline (CPG).   Outcome: Improving  Discharged to home 7/25/2018 at 1800.  Reviewed all discharge instructions including meds and follow-up using teach-back.

## 2018-07-26 NOTE — PROGRESS NOTES
Called patient with outside pathology review results:  FINAL DIAGNOSIS:   Neck, left, fine needle aspiration: ([id number], 04/04/2017):     No   definitive evidence of lymphoma.        Outside flow cytometric immunophenotyping ([id number]) reported no   immunophenotypic evidence of leukemia   or lymphoma.     COMMENT:   The cytologic preparations show a polymorphous mix of lymphoid cells. Most    of the cells appear mature and   small with some scattered larger cells seen throughout.     She will follow-up with Dr. Killian as needed. Dr. Killian did state that we could offer patient a repeat US guided FNA. Patient would like to proceed with repeat biopsy. Orders entered and she will be called by schedulers to arrange biopsy.Patient encouraged to call with further questions or concerns.       Daisy Rivera, RN, BSN

## 2018-07-26 NOTE — TELEPHONE ENCOUNTER
Health Call Center    Phone Message    May a detailed message be left on voicemail: yes    Reason for Call: Other: Patient is supposed to schedule hospital follow up with Dr. Nguyen within one-two weeks of discharge. Please call back to discuss. Thank you.     Action Taken: Message routed to:  [facility] Neurology

## 2018-07-27 NOTE — TELEPHONE ENCOUNTER
Lauren George   You 21 hours ago (3:24 PM)                 Left pt a message. (Routing comment)                        You   Clinic Tnmcximciuut-Oqixtnyl-8u&T-Uc Yesterday (11:26 AM)                   Please call and schedule patient to come in and see Dr. Nguyen for a f/u.     Thank you.     Michelle MONSON LPN (Routing comment)                        Skye Luke, RN routed conversation to You Yesterday (10:42 AM)                       Sarita Schuster routed conversation to [facility] Neurology Adult [facility]; Clinic Ilemiufhhclt-Banxhqlr-4r&T-Uc Yesterday (10:24 AM)                       Sarita Schuster Yesterday (10:24 AM)                 [facility]     Phone Message     May a detailed message be left on voicemail: yes     Reason for Call: Other: Patient is supposed to schedule hospital follow up with Dr. Nguyen within one-two weeks of discharge. Please call back to discuss. Thank you.      Action Taken: Message routed to:  [facility] Neurology                     Documentation                          Ada Welch [phone]  Sarita Schuster Yesterday (10:23 AM)

## 2018-07-27 NOTE — NURSING NOTE
Ada Welch  MRN: [id number]  Female, 47 year old, 1970  Hx of EDS & elevated F8     Reviewed & updated allergy & med list.  She is here to review her clinical course over the course of almost the past 1 year and get recommendations in the perioperative setting for a potentially planned pelvic floor surgery.  She is trying PT first (for pelvic floor issues) & will let us know if surgery is planned & how extensive.  We will finalize plan, but most likely will use prophy Lovenox around above surgery. Also referred to Rheumatology.  See AVS for further details.  Gladis Forman, RN, MSN -Nurse Clinician, [facility] [phone]

## 2018-07-30 ENCOUNTER — TELEPHONE (OUTPATIENT)
Dept: GASTROENTEROLOGY | Facility: CLINIC | Age: 48
End: 2018-07-30

## 2018-07-30 ASSESSMENT — ENCOUNTER SYMPTOMS
COUGH: 1
DYSPNEA ON EXERTION: 1
EYE REDNESS: 1
HYPERTENSION: 0
ORTHOPNEA: 1
HEMATURIA: 0
DIFFICULTY URINATING: 1
TROUBLE SWALLOWING: 1
TASTE DISTURBANCE: 1
SNORES LOUDLY: 1
TREMORS: 1
RECTAL PAIN: 0
HEMOPTYSIS: 0
SMELL DISTURBANCE: 1
SPUTUM PRODUCTION: 1
NECK MASS: 1
POLYPHAGIA: 0
BACK PAIN: 1
MYALGIAS: 1
HEARTBURN: 1
BOWEL INCONTINENCE: 0
SORE THROAT: 0
FEVER: 0
FLANK PAIN: 1
POLYDIPSIA: 1
SHORTNESS OF BREATH: 1
MUSCLE WEAKNESS: 1
DIARRHEA: 1
TINGLING: 1
POOR WOUND HEALING: 1
DIZZINESS: 1
WEIGHT LOSS: 0
WHEEZING: 1
INCREASED ENERGY: 1
LEG PAIN: 1
PARALYSIS: 0
BLOOD IN STOOL: 0
NAUSEA: 1
PALPITATIONS: 1
CONSTIPATION: 1
DECREASED APPETITE: 1
JAUNDICE: 0
SEIZURES: 0
ABDOMINAL PAIN: 0
SINUS PAIN: 1
HEADACHES: 1
MEMORY LOSS: 1
SLEEP DISTURBANCES DUE TO BREATHING: 0
WEAKNESS: 1
SPEECH CHANGE: 1
JOINT SWELLING: 1
FATIGUE: 1
NUMBNESS: 1
STIFFNESS: 1
BLOATING: 1
HYPOTENSION: 1
LIGHT-HEADEDNESS: 1
NIGHT SWEATS: 1
POSTURAL DYSPNEA: 1
NECK PAIN: 1
BRUISES/BLEEDS EASILY: 1
EYE PAIN: 0
COUGH DISTURBING SLEEP: 0
DYSURIA: 1
NAIL CHANGES: 0
EXERCISE INTOLERANCE: 1
WEIGHT GAIN: 1
EYE WATERING: 1
SYNCOPE: 0
MUSCLE CRAMPS: 1
SINUS CONGESTION: 1
ALTERED TEMPERATURE REGULATION: 1
DOUBLE VISION: 1
VOMITING: 1
ARTHRALGIAS: 1
HOARSE VOICE: 1
HALLUCINATIONS: 0
SWOLLEN GLANDS: 1
CHILLS: 1
LOSS OF CONSCIOUSNESS: 1
SKIN CHANGES: 1
DISTURBANCES IN COORDINATION: 1
EYE IRRITATION: 1

## 2018-07-30 NOTE — TELEPHONE ENCOUNTER
Patient scheduled for EGD     Indication for procedure. Esophageal dysphagia     Referring Provider. Shilpa Galicia MD    ? No     Arrival time verified? Patient to arrive at 945 am     Facility location verified? [address]     Instructions given regarding prep and procedure. Transportation policy reviewed and verbalized understanding.     Prep Type NPO     Are you taking any anticoagulants or blood thinners? Denies     Instructions given? Yes     Electronic implanted devices? Denies     Pre procedure teaching completed? Yes    Transportation from procedure? Yes     H&P / Pre op physical completed? N/A    Adan Kiser RN

## 2018-08-02 ENCOUNTER — CARE COORDINATION (OUTPATIENT)
Dept: NEUROLOGY | Facility: CLINIC | Age: 48
End: 2018-08-02

## 2018-08-02 ENCOUNTER — TELEPHONE (OUTPATIENT)
Dept: GASTROENTEROLOGY | Facility: CLINIC | Age: 48
End: 2018-08-02

## 2018-08-02 ENCOUNTER — TELEPHONE (OUTPATIENT)
Dept: RHEUMATOLOGY | Facility: CLINIC | Age: 48
End: 2018-08-02

## 2018-08-02 DIAGNOSIS — G43.719 INTRACTABLE CHRONIC MIGRAINE WITHOUT AURA AND WITHOUT STATUS MIGRAINOSUS: Primary | ICD-10-CM

## 2018-08-02 DIAGNOSIS — R00.0 SINUS TACHYCARDIA: ICD-10-CM

## 2018-08-02 RX ORDER — TENS UNITS AND TENS ELECTRODES
1 COMBINATION PACKAGE (EA) MISCELLANEOUS DAILY PRN
Qty: 1 DEVICE | Refills: 0 | Status: SHIPPED | OUTPATIENT
Start: 2018-08-02 | End: 2019-10-23

## 2018-08-02 NOTE — TELEPHONE ENCOUNTER
FUTURE VISIT INFORMATION      FUTURE VISIT INFORMATION:    Date: 08/13/2018    Time: 1:40    [place] [facility]  REFERRAL INFORMATION:    Referring provider:  SELF    Referring providers clinic:  N/A    Reason for visit/diagnosis  Difficultly Swallowing    RECORDS REQUESTED FROM:       Clinic name Comments Records Status Imaging Status   [facility] CANCER OFFICE VISIT: 09/12/2017  IMAGE:  XR SWALLOW STUDY INTERNAL YES                                   RECORDS STATUS

## 2018-08-02 NOTE — TELEPHONE ENCOUNTER
Called pt to confirm colonoscopy appointment on 8/2/18.  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  Pt offered to contact Dr. Leach, who manages this condition.  RN encouraged pt to contact this MD and pt stated she would call RN back after talking to Dr. eLach if any issues should come up.

## 2018-08-02 NOTE — PROGRESS NOTES
Dr. Nguyen brought me a signed rx today to put in folder for patient to  today since her daughter has an appt [place] the [facility] [place]. I cancelled her appt per her approval since she discussed with patient. I am letting the 's know where the sealed envelope is, so it is easy for them to access.

## 2018-08-02 NOTE — TELEPHONE ENCOUNTER
[facility] Call Center    Phone Message    May a detailed message be left on voicemail: yes    Reason for Call: Other: Patient has DX: Tom-Danlos Syndrome and is being referred by Dr. Rollins in Hematology at the [facility]. Patient really needs to be seen for her arthritis and degeneration. She's OK with not seeing and ES specialists but would like to be seen for the issues that come from her diagnosis. Patient used to work with Rajan Pascual with [facility]. Records are all in Care Everywhere and are all at the [facility] now too.   Patient is hoping the clinic will make an exception to treat her.     Action Taken: Message routed to:  [facility] Rheum

## 2018-08-02 NOTE — PROGRESS NOTES
Patient scheduled to see me today. The appointment is not needed.    Spoke with he sophia phone. We are still trying to get Aimovig. We will order cefaly device and increase propranolol.    Reviewed MRI and MRA results.    She will stop by and  rx    She will contact ENT about suspected sinus infection    Noemy Nguyen MD FAAN  Department of Neurology  Pager [phone]

## 2018-08-03 ENCOUNTER — HOSPITAL ENCOUNTER (OUTPATIENT)
Facility: CLINIC | Age: 48
Discharge: HOME | End: 2018-08-03
Attending: INTERNAL MEDICINE | Admitting: INTERNAL MEDICINE
Payer: COMMERCIAL

## 2018-08-03 ENCOUNTER — SURGERY (OUTPATIENT)
Age: 48
End: 2018-08-03

## 2018-08-03 VITALS
SYSTOLIC BLOOD PRESSURE: 102 MMHG | OXYGEN SATURATION: 95 % | DIASTOLIC BLOOD PRESSURE: 73 MMHG | RESPIRATION RATE: 19 BRPM

## 2018-08-03 LAB — UPPER GI ENDOSCOPY: NORMAL

## 2018-08-03 PROCEDURE — 88305 TISSUE EXAM BY PATHOLOGIST: CPT | Performed by: INTERNAL MEDICINE

## 2018-08-03 PROCEDURE — 25000128 H RX IP 250 OP 636: Performed by: INTERNAL MEDICINE

## 2018-08-03 PROCEDURE — 25000132 ZZH RX MED GY IP 250 OP 250 PS 637: Performed by: INTERNAL MEDICINE

## 2018-08-03 PROCEDURE — 25000125 ZZHC RX 250: Performed by: INTERNAL MEDICINE

## 2018-08-03 PROCEDURE — 99152 MOD SED SAME PHYS/QHP 5/>YRS: CPT | Performed by: INTERNAL MEDICINE

## 2018-08-03 PROCEDURE — 43239 EGD BIOPSY SINGLE/MULTIPLE: CPT | Performed by: INTERNAL MEDICINE

## 2018-08-03 RX ORDER — FENTANYL CITRATE 50 UG/ML
INJECTION, SOLUTION INTRAMUSCULAR; INTRAVENOUS PRN
Status: DISCONTINUED | OUTPATIENT
Start: 2018-08-03 | End: 2018-08-03 | Stop reason: HOSPADM

## 2018-08-03 RX ORDER — SIMETHICONE
LIQUID (ML) MISCELLANEOUS PRN
Status: DISCONTINUED | OUTPATIENT
Start: 2018-08-03 | End: 2018-08-03 | Stop reason: HOSPADM

## 2018-08-03 RX ADMIN — MIDAZOLAM 1 MG: 1 INJECTION INTRAMUSCULAR; INTRAVENOUS at 09:16

## 2018-08-03 RX ADMIN — FENTANYL CITRATE 50 MCG: 50 INJECTION, SOLUTION INTRAMUSCULAR; INTRAVENOUS at 09:11

## 2018-08-03 RX ADMIN — MIDAZOLAM 2 MG: 1 INJECTION INTRAMUSCULAR; INTRAVENOUS at 09:11

## 2018-08-03 RX ADMIN — TOPICAL ANESTHETIC 1 SPRAY: 200 SPRAY DENTAL; PERIODONTAL at 09:09

## 2018-08-03 RX ADMIN — FENTANYL CITRATE 25 MCG: 50 INJECTION, SOLUTION INTRAMUSCULAR; INTRAVENOUS at 09:16

## 2018-08-03 RX ADMIN — Medication 2 ML: at 09:00

## 2018-08-03 NOTE — OR NURSING
After obtaining consent adminstered sedation and oxygen via nasal Cannula @ 2 Litters. Tolerated procedure well.

## 2018-08-03 NOTE — IP AVS SNAPSHOT
[facility] [place], Endoscopy    [address] [address]    Phone:  [phone]                                       After Visit Summary   8/3/2018    Ada Welch    MRN: [id number]           After Visit Summary Signature Page     I have received my discharge instructions, and my questions have been answered. I have discussed any challenges I see with this plan with the nurse or doctor.    ..........................................................................................................................................  Patient/Patient Representative Signature      ..........................................................................................................................................  Patient Representative Print Name and Relationship to Patient    ..................................................               ................................................  Date                                            Time    ..........................................................................................................................................  Reviewed by Signature/Title    ...................................................              ..............................................  Date                                                            Time

## 2018-08-03 NOTE — IP AVS SNAPSHOT
MRN:[id number]                      After Visit Summary   8/3/2018    Ada Welch    MRN: [id number]           Thank you!     Thank you for choosing [facility] for your care. Our goal is always to provide you with excellent care. Hearing back from our patients is one way we can continue to improve our services. Please take a few minutes to complete the written survey that you may receive in the mail after you visit with us. Thank you!        Patient Information     Date Of Birth          1970        About your hospital stay     You were admitted on:  August 3, 2018 You last received care in the:  [facility], Endoscopy    You were discharged on:  August 3, 2018       Who to Call     For medical emergencies, please call 911.  For non-urgent questions about your medical care, please call your primary care provider or clinic, [phone]  For questions related to your surgery, please call your surgery clinic        Attending Provider     Provider Specialty    Juan Woodson MD Gastroenterology       Primary Care Provider Office Phone # Fax #    MohawkDenise Rivera [phone]      Your next 10 appointments already scheduled     Aug 08, 2018  2:15 PM CDT   (Arrive by 2:00 PM)   Return Visit with Wade Singh MD   [facility]rologic C[facility] ([facility])    [address] [place] [id number] [phone]            Aug 08, 2018  4:20 PM CDT   (Arrive by 4:05 PM)   Return Visit with Juana Griffith MD   [facility] ([facility])    [address] [place] [address] [phone]            Aug 13, 2018  1:40 PM CDT   (Arrive by 1:25 PM)   NEW THROAT with Seema Eubanks MD   [facility] Nose [facility] Throat ([facility])    [address] [place] [id number]    [phone]           This is a multi-disciplinary care team visit as patients with your type of problem are usually seen by a team of an MD and a Speech-Language Pathologist (who is a specialist in disorders of the voice, throat, and breathing).  Please plan about 2 hours for your visit, which will likely include Laryngeal Function Studies, a Voice/Swallow/Breathing Evaluation, and an Endoscopic Laryngeal Examination to provide a comprehensive evaluation.  Please check with your insurance company to ensure you are covered for these services. - It is important to know that if you are evaluated and/or treated by both a physician and a speech pathologist during your visit, your billing will reflect the input that you receive from both providers as separate professionals. Although most insurance plans do cover these services, we encourage you to contact your insurance company prior to your visit to determine whether there are any coverage limitations that might affect you financially. - Billing/procedure codes that are frequently associated with visits to our clinic include (but are not limited to) the ones listed below. Most patients will not need all of these items, but it may be useful to ask your insurance company's patient . 64780: Flexible fiberoptic laryngoscopy, 93335: Laryngoscopy; flexible or rigid fiberoptic, with stroboscopy, 60242: Flexible endoscopic evaluation of swallowing, 23630: Laryngeal function aerodynamic evaluation, 67741: Evaluation of Voice and Resonance, 63221: Speech pathology treatment for voice, speech, communication, 11797: Speech pathology treatment for swallowing/oral function for feeding - If you have any concerns or questions, or if you would prefer not to have a speech pathologist involved in your visit, please contact our Clinic Coordinator at [phone], at least 24 hours prior to your appointment.            Aug 13, 2018  1:40 PM CDT   (Arrive by 1:25  PM)   P Speech Pathology and ENT Evaluation with  Ent Dysphonia Slp Provider   [facility] Voice ([facility])    [address] [place] [address]  [address] [facility] [address]   [phone]            Aug 13, 2018  3:25 PM CDT   (Arrive by 3:10 PM)   Return Visit with Shilpa Galicia MD   [facility] Primary [facility] ([facility])    [address] [place] [address] [phone]            Aug 15, 2018  2:45 PM CDT   (Arrive by 2:30 PM)   Return Visit with Zoe Barahona MD   [facility] Ear Nose and Throat ([facility])    [address] [place] [address]   [phone]            Aug 22, 2018  3:40 PM CDT   (Arrive by 3:25 PM)   Return Visit with Juana Griffith MD   [facility] Gastroenterology and IBD Clinic ([facility])    [address] [place] [address]   [phone]            Sep 13, 2018  1:50 PM CDT   (Arrive by 1:35 PM)   RETURN ARRHYTHMIA with Kirk Russell MD   [facility] ([facility])    [address] [place] [address]  [address]  [facility] [address]   [phone]            Oct 22, 2018  7:35 AM CDT   (Arrive by 7:20 AM)   New Allergy with Vahe Rodriguez MD   [facility] ([facility])    [address] [place] [address]  [address]  [facility] [address] [phone]           Do not take anti-histamines or Zantac for seven day prior to your appointment.            Oct 25, 2018  9:25 AM CDT   (Arrive by 9:10 AM)   Return Visit with Ellyn Rivera MD   [facility] Primary [facility] ([facility])    [address] [place] [address] [phone]              Pending Results     No orders found from 8/1/2018 to 8/4/2018.            Admission Information     Date & Time Provider  Department Dept. Phone    8/3/2018 Juan Woodson MD [facility] [place], Endoscopy [phone]      Your Vitals Were     Blood Pressure Respirations Pulse Oximetry             109/75 15 97%         MyChart Information     [employer] gives you secure access to your electronic health record. If you see a primary care provider, you can also send messages to your care team and make appointments. If you have questions, please call your primary care clinic.  If you do not have a primary care provider, please call [phone] and they will assist you.        Care EveryWhere ID     This is your Care EveryWhere ID. This could be used by other organizations to access your [place] medical records  [id number]        Equal Access to Services     CHRISTINA GUY : Ashutosh Slater, herve oakes, analilia arellano, odette tang. So [facility] [phone].    ATENCIÓN: Si habla español, tiene a sweeney disposición servicios gratuitos de asistencia lingüística. Llame al [phone].    We comply with applicable federal civil rights laws and [place] laws. We do not discriminate on the basis of race, color, national origin, age, disability, sex, sexual orientation, or gender identity.               Review of your medicines      UNREVIEWED medicines. Ask your doctor about these medicines        Dose / Directions    ACETAMINOPHEN PO        Dose:  1000 mg   Take 1,000 mg by mouth every 6 hours as needed for pain Take with tramadol   Refills:  0       ADVAIR DISKUS 250-50 MCG/DOSE diskus inhaler   Generic drug:  fluticasone-salmeterol        Dose:  1 puff   Inhale 1 puff into the lungs 2 times daily as needed   Refills:  0       albuterol 108 (90 Base) MCG/ACT Inhaler   Commonly known as:  PROAIR HFA/PROVENTIL HFA/VENTOLIN HFA        Dose:  2 puff   Inhale 2 puffs into the lungs every 6 hours as needed   Refills:  0       AMITRIPTYLINE HCL PO        Take 40 mg by mouth daily for two weeks, then  increase to 50 mg daily.   Refills:  0       buprenorphine 10 MCG/HR WK patch   Commonly known as:  BUTRANS   Used for:  Postlaminectomy syndrome of lumbar region        Dose:  1 patch   Place 1 patch onto the skin every 7 days   Quantity:  4 patch   Refills:  2       butalbital-acetaminophen-caffeine -40 MG per tablet   Commonly known as:  FIORICET/ESGIC        TAKE ONE TABLET BY MOUTH EVERY 4 HOURS AS NEEDED   Refills:  0       cetirizine 10 MG tablet   Commonly known as:  zyrTEC        Dose:  10 mg   Take 10 mg by mouth daily   Refills:  0       DULOXETINE HCL PO        Take 60 mg by mouth in the morning and 30 mg in the evening.   Refills:  0       eletriptan 20 MG tablet   Commonly known as:  RELPAX   Used for:  Chronic daily headache, Intractable migraine without aura and with status migrainosus        Dose:  20 mg   Take 1 tablet (20 mg) by mouth at onset of headache for migraine May repeat in 2 hours. Max 4 tablets/24 hours.   Quantity:  18 tablet   Refills:  1       EPINEPHrine 0.3 MG/0.3ML injection 2-pack   Commonly known as:  EPIPEN/ADRENACLICK/or ANY BX GENERIC EQUIV        Dose:  0.3 mg   Inject 0.3 mg into the muscle   Refills:  0       famotidine 20 MG tablet   Commonly known as:  PEPCID   Used for:  Hiatal hernia, Mast cell disorder        Dose:  20 mg   Take 1 tablet (20 mg) by mouth At Bedtime   Quantity:  90 tablet   Refills:  1       fludrocortisone 0.1 MG tablet   Commonly known as:  FLORINEF   Used for:  Autonomic dysfunction, Pre-syncope        Dose:  0.2 mg   Take 2 tablets (0.2 mg) by mouth daily   Quantity:  180 tablet   Refills:  3       fluticasone 50 MCG/ACT spray   Commonly known as:  FLONASE   Used for:  Chronic rhinitis, unspecified type        INSTILL 2 SPRAYS INTO BOTH NOSTRILS DAILY   Quantity:  48 mL   Refills:  3       linaclotide 145 MCG capsule   Commonly known as:  LINZESS   Used for:  Chronic idiopathic constipation        Dose:  145 mcg   Take 1 capsule (145 mcg) by  mouth every morning (before breakfast) Last refill until GI clinic follow up.   Quantity:  30 capsule   Refills:  0       metoclopramide 5 MG tablet   Commonly known as:  REGLAN   Used for:  Intractable chronic migraine without aura and without status migrainosus        Dose:  5-10 mg   Take 1-2 tablets (5-10 mg) by mouth every 6 hours as needed   Quantity:  180 tablet   Refills:  1       MULTIVITAMIN PO        Dose:  1 tablet   Take 1 tablet by mouth daily   Refills:  0       OLANZapine 5 MG tablet   Commonly known as:  zyPREXA   Used for:  Other migraine with status migrainosus, intractable, Intractable chronic migraine without aura and without status migrainosus        Dose:  5 mg   Take 1 tablet (5 mg) by mouth daily as needed (headache pain, nausea)   Quantity:  30 tablet   Refills:  3       omeprazole 20 MG CR capsule   Commonly known as:  priLOSEC   Used for:  Hiatal hernia        Dose:  20 mg   Take 1 capsule (20 mg) by mouth daily   Quantity:  90 capsule   Refills:  2       ondansetron 4 MG ODT tab   Commonly known as:  ZOFRAN-ODT   Used for:  Migraine without status migrainosus, not intractable, unspecified migraine type        Dose:  4-8 mg   Take 1-2 tablets (4-8 mg) by mouth every 12 hours as needed for nausea   Quantity:  60 tablet   Refills:  1       oxyCODONE IR 5 MG tablet   Commonly known as:  ROXICODONE   Used for:  Postlaminectomy syndrome        Dose:  2.5 mg   Take 0.5 tablets (2.5 mg) by mouth every 6 hours as needed for severe pain   Quantity:  15 tablet   Refills:  0       PROBIOTIC DAILY PO        Dose:  2 packet   Take 2 packets by mouth every morning   Refills:  0       Propranolol HCl SR Beads 120 MG Cp24   Used for:  Sinus tachycardia        Dose:  120 mg   Take 120 mg by mouth daily   Quantity:  30 capsule   Refills:  11       rizatriptan 5 MG ODT tab   Commonly known as:  MAXALT-MLT        TAKE 1-2 TABLETS (5-10 MG) BY MOUTH AT ONSET OF HEADACHE FOR MIGRAINE (MAX 30 MG IN 24 HOURS)    Refills:  6       SUMAtriptan 6 MG/0.5ML pen injector kit   Commonly known as:  IMITREX STATDOSE   Used for:  Chronic migraine without aura without status migrainosus, not intractable        Dose:  6 mg   Inject 0.5 mLs (6 mg) Subcutaneous at onset of headache for migraine (may repeat once after 2 hrs) May repeat in 1 hour. Max 12 mg/24 hours.   Quantity:  2 kit   Refills:  1       SYNTHROID 25 MCG tablet   Generic drug:  levothyroxine        Dose:  25 mcg   Take 25 mcg by mouth daily   Refills:  0       tiZANidine 4 MG tablet   Commonly known as:  ZANAFLEX        Dose:  4 mg   Take 1 tablet (4 mg) by mouth 4 times daily as needed for muscle spasms   Quantity:  120 tablet   Refills:  3       traMADol 50 MG tablet   Commonly known as:  ULTRAM   Used for:  Postlaminectomy syndrome        Dose:  50 mg   Take 1 tablet (50 mg) by mouth every 6 hours as needed for severe pain (Take scheduled every 6 hours for one week, then resume taking as needed)   Quantity:  60 tablet   Refills:  0       VITAMIN D (CHOLECALCIFEROL) PO        Dose:  2000 Units   Take 2,000 Units by mouth daily   Refills:  0       ZONISAMIDE PO        Dose:  75 mg   Take 75 mg by mouth daily   Refills:  0         CONTINUE these medicines which have NOT CHANGED        Dose / Directions    CEFALY KIT Pauly   Used for:  Intractable chronic migraine without aura and without status migrainosus        Dose:  1 Device   1 Device daily as needed   Quantity:  1 Device   Refills:  0       COMPRESSION STOCKINGS   Used for:  Orthostatic hypotension        Dose:  1 each   1 each daily 20-30 mmHg Thigh Length measure and fit, color and style per patient preference. Doshar n jose per need.   Quantity:  3 each   Refills:  3       medical cannabis (Patient's own supply.  Not a prescription)        Dose:  1 Dose   Take 1 Dose by mouth See Admin Instructions (This is NOT a prescription, and does not certify that the patient has a qualifying medical condition for medical  cannabis.  The purpose of this order is  to document that the patient reports taking medical cannabis.)   Refills:  0                Protect others around you: Learn how to safely use, store and throw away your medicines at www.disposemymeds.org.             Medication List: This is a list of all your medications and when to take them. Check marks below indicate your daily home schedule. Keep this list as a reference.      Medications           Morning Afternoon Evening Bedtime As Needed    ACETAMINOPHEN PO   Take 1,000 mg by mouth every 6 hours as needed for pain Take with tramadol                                ADVAIR DISKUS 250-50 MCG/DOSE diskus inhaler   Inhale 1 puff into the lungs 2 times daily as needed   Generic drug:  fluticasone-salmeterol                                albuterol 108 (90 Base) MCG/ACT Inhaler   Commonly known as:  PROAIR HFA/PROVENTIL HFA/VENTOLIN HFA   Inhale 2 puffs into the lungs every 6 hours as needed                                AMITRIPTYLINE HCL PO   Take 40 mg by mouth daily for two weeks, then increase to 50 mg daily.                                buprenorphine 10 MCG/HR WK patch   Commonly known as:  BUTRANS   Place 1 patch onto the skin every 7 days                                butalbital-acetaminophen-caffeine -40 MG per tablet   Commonly known as:  FIORICET/ESGIC   TAKE ONE TABLET BY MOUTH EVERY 4 HOURS AS NEEDED                                CEFALY KIT Pauly   1 Device daily as needed                                cetirizine 10 MG tablet   Commonly known as:  zyrTEC   Take 10 mg by mouth daily                                COMPRESSION STOCKINGS   1 each daily 20-30 mmHg Thigh Length measure and fit, color and style per patient preference. Doff n jose per need.                                DULOXETINE HCL PO   Take 60 mg by mouth in the morning and 30 mg in the evening.                                eletriptan 20 MG tablet   Commonly known as:  RELPAX   Take  1 tablet (20 mg) by mouth at onset of headache for migraine May repeat in 2 hours. Max 4 tablets/24 hours.                                EPINEPHrine 0.3 MG/0.3ML injection 2-pack   Commonly known as:  EPIPEN/ADRENACLICK/or ANY BX GENERIC EQUIV   Inject 0.3 mg into the muscle                                famotidine 20 MG tablet   Commonly known as:  PEPCID   Take 1 tablet (20 mg) by mouth At Bedtime                                fludrocortisone 0.1 MG tablet   Commonly known as:  FLORINEF   Take 2 tablets (0.2 mg) by mouth daily                                fluticasone 50 MCG/ACT spray   Commonly known as:  FLONASE   INSTILL 2 SPRAYS INTO BOTH NOSTRILS DAILY                                linaclotide 145 MCG capsule   Commonly known as:  LINZESS   Take 1 capsule (145 mcg) by mouth every morning (before breakfast) Last refill until GI clinic follow up.                                medical cannabis (Patient's own supply.  Not a prescription)   Take 1 Dose by mouth See Admin Instructions (This is NOT a prescription, and does not certify that the patient has a qualifying medical condition for medical cannabis.  The purpose of this order is  to document that the patient reports taking medical cannabis.)                                metoclopramide 5 MG tablet   Commonly known as:  REGLAN   Take 1-2 tablets (5-10 mg) by mouth every 6 hours as needed                                MULTIVITAMIN PO   Take 1 tablet by mouth daily                                OLANZapine 5 MG tablet   Commonly known as:  zyPREXA   Take 1 tablet (5 mg) by mouth daily as needed (headache pain, nausea)                                omeprazole 20 MG CR capsule   Commonly known as:  priLOSEC   Take 1 capsule (20 mg) by mouth daily                                ondansetron 4 MG ODT tab   Commonly known as:  ZOFRAN-ODT   Take 1-2 tablets (4-8 mg) by mouth every 12 hours as needed for nausea                                oxyCODONE IR 5 MG  tablet   Commonly known as:  ROXICODONE   Take 0.5 tablets (2.5 mg) by mouth every 6 hours as needed for severe pain                                PROBIOTIC DAILY PO   Take 2 packets by mouth every morning                                Propranolol HCl SR Beads 120 MG Cp24   Take 120 mg by mouth daily                                rizatriptan 5 MG ODT tab   Commonly known as:  MAXALT-MLT   TAKE 1-2 TABLETS (5-10 MG) BY MOUTH AT ONSET OF HEADACHE FOR MIGRAINE (MAX 30 MG IN 24 HOURS)                                SUMAtriptan 6 MG/0.5ML pen injector kit   Commonly known as:  IMITREX STATDOSE   Inject 0.5 mLs (6 mg) Subcutaneous at onset of headache for migraine (may repeat once after 2 hrs) May repeat in 1 hour. Max 12 mg/24 hours.                                SYNTHROID 25 MCG tablet   Take 25 mcg by mouth daily   Generic drug:  levothyroxine                                tiZANidine 4 MG tablet   Commonly known as:  ZANAFLEX   Take 1 tablet (4 mg) by mouth 4 times daily as needed for muscle spasms                                traMADol 50 MG tablet   Commonly known as:  ULTRAM   Take 1 tablet (50 mg) by mouth every 6 hours as needed for severe pain (Take scheduled every 6 hours for one week, then resume taking as needed)                                VITAMIN D (CHOLECALCIFEROL) PO   Take 2,000 Units by mouth daily                                ZONISAMIDE PO   Take 75 mg by mouth daily

## 2018-08-06 LAB — COPATH REPORT: NORMAL

## 2018-08-07 ENCOUNTER — TELEPHONE (OUTPATIENT)
Dept: GASTROENTEROLOGY | Facility: CLINIC | Age: 48
End: 2018-08-07

## 2018-08-08 ENCOUNTER — OFFICE VISIT (OUTPATIENT)
Dept: GASTROENTEROLOGY | Facility: CLINIC | Age: 48
End: 2018-08-08
Payer: MEDICARE

## 2018-08-08 ENCOUNTER — OFFICE VISIT (OUTPATIENT)
Dept: UROLOGY | Facility: CLINIC | Age: 48
End: 2018-08-08
Payer: MEDICARE

## 2018-08-08 ENCOUNTER — TRANSFERRED RECORDS (OUTPATIENT)
Dept: HEALTH INFORMATION MANAGEMENT | Facility: CLINIC | Age: 48
End: 2018-08-08

## 2018-08-08 VITALS
DIASTOLIC BLOOD PRESSURE: 70 MMHG | BODY MASS INDEX: 32.47 KG/M2 | RESPIRATION RATE: 16 BRPM | TEMPERATURE: 98.2 F | WEIGHT: 206.9 LBS | HEART RATE: 72 BPM | OXYGEN SATURATION: 97 % | HEIGHT: 67 IN | SYSTOLIC BLOOD PRESSURE: 105 MMHG

## 2018-08-08 VITALS
DIASTOLIC BLOOD PRESSURE: 70 MMHG | HEIGHT: 67 IN | SYSTOLIC BLOOD PRESSURE: 105 MMHG | WEIGHT: 206.13 LBS | HEART RATE: 72 BPM | BODY MASS INDEX: 32.35 KG/M2

## 2018-08-08 DIAGNOSIS — K20.0 EOSINOPHILIC ESOPHAGITIS: Primary | ICD-10-CM

## 2018-08-08 DIAGNOSIS — M62.89 PELVIC FLOOR DYSFUNCTION: Primary | ICD-10-CM

## 2018-08-08 RX ORDER — OMEPRAZOLE 40 MG/1
40 CAPSULE, DELAYED RELEASE ORAL 2 TIMES DAILY PRN
Qty: 60 CAPSULE | Refills: 3 | Status: SHIPPED | OUTPATIENT
Start: 2018-08-08 | End: 2018-11-14

## 2018-08-08 RX ORDER — OMEPRAZOLE 40 MG/1
40 CAPSULE, DELAYED RELEASE ORAL 2 TIMES DAILY PRN
Qty: 60 CAPSULE | Refills: 1 | Status: SHIPPED | OUTPATIENT
Start: 2018-08-08 | End: 2018-08-08

## 2018-08-08 ASSESSMENT — PAIN SCALES - GENERAL
PAINLEVEL: NO PAIN (0)
PAINLEVEL: NO PAIN (0)

## 2018-08-08 NOTE — LETTER
"8/8/2018       RE: Ada Welch  [address] [place] [address]     Dear Colleague,    Thank you for referring your patient, Ada Welch, to the [facility] FOR PROSTATE AND UROLOGIC CANCERS at [facility]. Please see a copy of my visit note below.    UROLOGY CLINIC NOTE  Ada Welch  [id number]    Reason for visit:   Follow-up of urinary incontinence, nocturia     HPI:   Ada Welch is a 47 year old woman with a complex past medical history including urinary urgency and associated incontinence.       Reason for Visit: Urinary Urgency    Clinical Data:  Ms. Welch is a 47 year old female with gastroparesis, Ehler's Danlos Syndrome, Mast cell disease,  Chiari malformation, dysautonomia who also experiences urinary urgency with associated incontinence.This problem has been going on since she underwent a spinal fusion in 2013 and has been getting worse. She was last seen 6 months ago in clinic. At her last visit, she was referred to pelvic floor physical therapy and recommended to optimize her bowel movements.  Patient is scheduled to undergo biofeedback in October. She has since been started on Linzess by GI and has increased her fiber intake. She reports significant improvement in symptoms since optimizing her bowel regimen and changing her diet. She is now having daily bowel movements.     PEx:   Blood pressure 105/70, pulse 72, height 1.71 m (5' 7.32\"), weight 93.5 kg (206 lb 2.1 oz), not currently breastfeeding.  5' 7.32\", Body mass index is 31.98 kg/(m^2)., 206 lbs 2.08 oz  Gen appearance:  Well groomed  HEENT:  EOMI, AT NC      Admission on 07/24/2017, Discharged on 07/27/2017   Component Date Value Ref Range Status     Sodium 07/24/2017 141  133 - 144 mmol/L Final     Potassium 07/24/2017 4.0  3.4 - 5.3 mmol/L Final     Chloride 07/24/2017 110* 94 - 109 mmol/L Final     Carbon Dioxide 07/24/2017 27  20 - 32 mmol/L Final     Anion Gap " 07/24/2017 5  3 - 14 mmol/L Final     Glucose 07/24/2017 88  70 - 99 mg/dL Final     Urea Nitrogen 07/24/2017 8  7 - 30 mg/dL Final     Creatinine 07/24/2017 0.85  0.52 - 1.04 mg/dL Final     GFR Estimate 07/24/2017 72  >60 mL/min/1.7m2 Final    Non  GFR Calc     GFR Estimate If Black 07/24/2017 87  >60 mL/min/1.7m2 Final    African American GFR Calc     Calcium 07/24/2017 8.9  8.5 - 10.1 mg/dL Final     Bilirubin Total 07/24/2017 0.6  0.2 - 1.3 mg/dL Final     Albumin 07/24/2017 4.1  3.4 - 5.0 g/dL Final     Protein Total 07/24/2017 7.4  6.8 - 8.8 g/dL Final     Alkaline Phosphatase 07/24/2017 148  40 - 150 U/L Final     ALT 07/24/2017 56* 0 - 50 U/L Final     AST 07/24/2017 36  0 - 45 U/L Final     WBC 07/24/2017 5.7  4.0 - 11.0 10e9/L Final     RBC Count 07/24/2017 4.41  3.8 - 5.2 10e12/L Final     Hemoglobin 07/24/2017 13.5  11.7 - 15.7 g/dL Final     Hematocrit 07/24/2017 39.5  35.0 - 47.0 % Final     MCV 07/24/2017 90  78 - 100 fl Final     MCH 07/24/2017 30.6  26.5 - 33.0 pg Final     MCHC 07/24/2017 34.2  31.5 - 36.5 g/dL Final     RDW 07/24/2017 12.6  10.0 - 15.0 % Final     Platelet Count 07/24/2017 238  150 - 450 10e9/L Final     Diff Method 07/24/2017 Automated Method   Final     % Neutrophils 07/24/2017 61.9  % Final     % Lymphocytes 07/24/2017 29.0  % Final     % Monocytes 07/24/2017 5.1  % Final     % Eosinophils 07/24/2017 4.0  % Final     % Basophils 07/24/2017 0.0  % Final     % Immature Granulocytes 07/24/2017 0.0  % Final     Nucleated RBCs 07/24/2017 0  0 /100 Final     Absolute Neutrophil 07/24/2017 3.5  1.6 - 8.3 10e9/L Final     Absolute Lymphocytes 07/24/2017 1.7  0.8 - 5.3 10e9/L Final     Absolute Monocytes 07/24/2017 0.3  0.0 - 1.3 10e9/L Final     Absolute Eosinophils 07/24/2017 0.2  0.0 - 0.7 10e9/L Final     Absolute Basophils 07/24/2017 0.0  0.0 - 0.2 10e9/L Final     Abs Immature Granulocytes 07/24/2017 0.0  0 - 0.4 10e9/L Final     Absolute Nucleated RBC 07/24/2017  0.0   Final     CRP Inflammation 07/24/2017 <2.9  0.0 - 8.0 mg/L Final     Sed Rate 07/24/2017 10  0 - 20 mm/h Final     INR 07/25/2017 1.01  0.86 - 1.14 Final     WBC CSF 07/26/2017 0  0 - 5 /uL Final     RBC CSF 07/26/2017 0  0 - 2 /uL Final     Tube Number 07/26/2017 4  # Final     Color CSF 07/26/2017 Colorless  CLRL Final     Appearance CSF 07/26/2017 Clear  CLER Final     Glucose CSF 07/26/2017 63  40 - 70 mg/dL Final    CSF glucose concentrations are about 60 percent of normal plasma glucose.     Protein Total CSF 07/26/2017 36  15 - 60 mg/dL Final     Glucose 07/26/2017 98  70 - 99 mg/dL Final       ASSESSMENT and PLAN:  This is a 47 year old female with a complex medical history presentingn for follow up of night time urgency with incontinence. Patient symptoms have improved with lifestyle and dietary modification and optimizing her bowel regimen. She is to start pelvic floor physical therapy in October.     - Continue lifestyle modifications - avoid bladder irritants, increase fiber intake   - Continue current bowel regimen   - Continue pelvic physical therapy   - Follow up in 6 months or sooner if symptoms worsen     Fabiana Villalba MD   Urology Resident PGY-4    Patient was seen, evaluated and plan was formulated in conjunction with me and I agree with the above.    Again, thank you for allowing me to participate in the care of your patient.      Sincerely,    Wade Singh MD

## 2018-08-08 NOTE — PATIENT INSTRUCTIONS
Follow up in 6 months.      It was a pleasure meeting with you today.  Thank you for allowing me and my team the privilege of caring for you today.  YOU are the reason we are here, and I truly hope we provided you with the excellent service you deserve.  Please let us know if there is anything else we can do for you so that we can be sure you are leaving completely satisfied with your care experience.

## 2018-08-08 NOTE — PROGRESS NOTES
GI CLINIC VISIT    CC/REFERRING MD:  Jerad Hebert  REASON FOR CONSULTATION:   Jerad Hebert for   Chief Complaint   Patient presents with     Gastrointestinal Problem     F/U for IBS and pelvic floor results       ASSESSMENT/PLAN:  Ms. 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 Of note, she was previously seen for dysphagia and esophageal eosinophilia by Dr. Roland, who thought that the findings may be reflux-related in the setting of a hiatal hernia.     Regarding dysphagia, this may be EoE or PPI-REE. She has not tried high dose PPI therapy. Will plan for this x 8 weeks with repeat EGD. In the meantime, Mrs. Welch will follow a mostly puree diet and will avoid meat.     Regarding her constipation, the Linzess is working very well and she has regular, daily BMs now. Her [facility] evaluation was s/f rectal intussusception. She plans to undergo biofeedback therapy in October and may need surgical correction in the future.     # Esophageal eosinophilia  --Omeprazole 40 mg BID x 8 weeks with repeat EGD in 8 weeks to check for PPI-REE. If there is persistent eosinophilia, will plan for swallowed budesonide with possible modified elimination diet.     # Rectal intussusception   # Pelvic floor dysfunction  # Tom-Danlos syndroe  --Biofeedback therapy in October through the [facility]. Continue to work with [facility] regarding possible need for surgical repair.   --Continue Linzess    RTC: Return in about 3 months (around 11/8/2018).     A total of 40 minutes, face to face, was spent with this patient, >50% of which was counseling regarding the above delineated issues.    Juana Griffith MD   of Medicine  Division of Gastroenterology, Hepatology and Nutrition  [facility]      HPI  Ms. Welch is a 45 yo woman with Tom  "Danlos syndrome presenting for evaluation of \"stomach issues\".  She is in the process of work up for mastocytosis (urine showed elevated histamine levels, but patient was on antacid therapy and Mobic at that time). PMHx also includes 5 vaginal deliveries, migraines, cervicalgia, chronic pain syndrome, degenerative joint disease, chronic pan sinusitis.  She is referred to GI clinic for evaluation of possible IBS with mixed stools. Ms. Welch presents with her daughters (Daniel and Magda) today.     Ms. Welch reports lifelong symptoms of abdominal distension, bloating and borborygmi shortly after eating.  Stools alternates between loose stools and constipation. Usually, she has constipation in the form of hard stools and  incomplete evacuation. Performs splinting (applies posterior vaginal wall pressure with fingers) to help evacuate stool. Had \"pelvic floor releases\" with PT in [place], [place] and [facility]\" in [place], [place] with some help. Period of constipation will last for 1 month; during this time, will have 1 BM weekly with Miralax. Endorses lower abdominal pain that worsens with constipation.  Previously failed Miralax, senna, dulcolax, fiber, suppositories.  Period of constipation is then followed by several days of loose stools. Ms. Welch endorses occasional BRB with straining in the toilet and TP x 20 years. Cscope 3/2017 showed small mouthed diverticulosis in the sigmoid and 1 hyperplastic polyp; repeat cscope in 10 years was the recommendation. Uses buprenorphine patch (opioid) x 1 year.     In addition to constipation, there are some trigger foods for abdominal pain: eggs, milk, fatty foods.   Gall bladder is still in place. Has had weight gain since summer 2017. Endorses hair loss. TSH was normal 10/5/2017.     Takes prilosec during the day and pepcid at night with adequate heartburn control.     Interval history  During the spring time, developed dysphagia. This was assessed with EGD that was " concerning for EoE grossly. She had been on prilosec 20mg + Pepcid prior to the scope. Had not olga on twice daily PPI prior to scope. Per Dr. Roland's assessment in 2017, the thought was that the eosinophilia (previously seen on prior endoscopy) may be 2/2 reflux in the setting of a hiatal hernia.     Currently, endorses continued dysphagia. This is especially with dry meat. Avoids this by eating minced, moist meat. Liquid, purees is completely OK.     Saw [facility] with plans to undergo biofeedback therapy in October. Rectal intussusception was noted and will likely need surgical repair in the future. Linzess has been very helpful with constipation and bloating. Having a daily BM.     ROS:    No fevers or chills  No weight loss  No blurry vision, double vision or change in vision  No sore throat  No lymphadenopathy  No headache, paraesthesias, or weakness in a limb  No shortness of breath or wheezing  No chest pain or pressure  No rashes or skin changes  No odynophagia or dysphagia  No melena  No dysuria, frequency or urgency  No hot/cold intolerance or polyria  No anxiety or depression    PROBLEM LIST  Patient Active Problem List    Diagnosis Date Noted     Intractable migraine with status migrainosus 07/24/2018     Priority: Medium     Migraine 07/07/2018     Priority: Medium     Autonomic dysfunction 06/07/2018     Priority: Medium     Low back pain of multiple sites of spine with sciatica 12/09/2017     Priority: Medium     Intractable chronic migraine without aura and without status migrainosus 12/09/2017     Priority: Medium     Non compliance with medical treatment 12/09/2017     Priority: Medium     Obese 12/09/2017     Priority: Medium     Slow transit constipation 08/30/2017     Priority: Medium     Urinary urgency 08/30/2017     Priority: Medium     Nocturia 08/30/2017     Priority: Medium     Cardiac device in situ- [employer] Implanted Loop Recorder (ILR) 08/30/2017     Priority: Medium     Cervicalgia  08/25/2017     Priority: Medium     Headache 07/25/2017     Priority: Medium     Postlaminectomy syndrome, lumbar region 06/14/2017     Priority: Medium     Lymphadenopathy of left cervical region 03/27/2017     Priority: Medium     Easy bruising 10/13/2016     Priority: Medium     Head and neck lymphadenopathy 10/13/2016     Priority: Medium     Hepatomegaly 10/13/2016     Priority: Medium     Liver lesion 10/13/2016     Priority: Medium     Pain syndrome, chronic 09/05/2016     Priority: Medium     s/p TLIF L4-5,L5-S1 with solid arthrodesis 09/05/2016     Priority: Medium     Tom-Danlos syndrome 09/05/2016     Priority: Medium     Acquired hypothyroidism 07/22/2016     Priority: Medium     Anxiety 07/22/2016     Priority: Medium     Opioid dependence (H) 07/22/2016     Priority: Medium     Overview:   Treatment Agreement       Essential hypertension with goal blood pressure less than 140/90 07/22/2016     Priority: Medium     Low back pain 07/12/2016     Priority: Medium     Lumbar radiculopathy 02/18/2016     Priority: Medium     Encounter for genetic counseling and testing 01/05/2016     Priority: Medium     Overview:     [facility] Aortopathy Comprehensive Panel ordered 1/5/2016.       Sinus tachycardia 09/29/2015     Priority: Medium     Astigmatism 12/12/2014     Priority: Medium     Keratoconjunctivitis sicca (H) 12/12/2014     Priority: Medium     Presbyopia 12/12/2014     Priority: Medium     Opioid type dependence, continuous (H) 10/09/2014     Priority: Medium     Other acute postoperative pain 10/09/2014     Priority: Medium     Anxiety disorder 08/21/2014     Priority: Medium     Depression 08/21/2014     Priority: Medium     Pain disorder associated with psychological and physical factors 08/21/2014     Priority: Medium     Low back pain radiating to both legs 07/08/2014     Priority: Medium     Arthritis of facet joints at multiple vertebral levels (H) 01/01/2014     Priority: Medium      Spondylolisthesis of lumbar region 12/10/2013     Priority: Medium     Lumbar degenerative disc disease 11/21/2013     Priority: Medium     DNS (deviated nasal septum) 10/09/2012     Priority: Medium     Nasal turbinate hypertrophy 10/09/2012     Priority: Medium     Dysuria 07/12/2011     Priority: Medium     Chronic sinusitis with recurrent bronchitis 02/01/2007     Priority: Medium     Allergic rhinitis 01/09/2007     Priority: Medium     Cyst of fallopian tube 12/28/1996     Priority: Medium     Arthritis involving multiple sites 01/01/1986     Priority: Medium     Uterine endometriosis 01/01/1986     Priority: Medium       PERTINENT 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     Chronic sinusitis 00/2007    Diagnosed with chronic pansinusitis 2016     Chronic tonsillitis 1980    Had tonsils removed 02/1981, rheumatic fever     Tom-Danlos disease      Gastroesophageal reflux disease 3/1/2017    I have large hiatal hernia     Hernia, abdominal 10/2016    Hiatal Hernia     Hiatal hernia 2016    Have adeline hiatel hernia     Hoarseness Unsure    It comes and goes     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     Migraines 1/1/2007    Unsure of exact date     Nasal polyps 2013    Were revoved 03/2017, benign     Other nervous system complications 1/2014    Autonomic nervous system disorder, neuralgia, neuropathy     Pneumonia Unsure    Have had pneumonia several times     Swelling, mass, or lump in head and neck 08/2016    Lymph node has been growing for last year     Thyroid disease 01/01/2008   Was told she had an ulcer at age 15 in the setting of life stress. Not sure if she had an EGD at that time.        PREVIOUS SURGERIES:  Past Surgical History:   Procedure Laterality Date     ADENOIDECTOMY  1981     AS REPAIR OF NASAL SEPTUM       BACK SURGERY  12/09/2013  10/01/2014    Spinal fusion L4-S1, L5 moving 5/8ths in. Remove screws/rods     BIOPSY  01/01/2008 & 3/2017    mole biopsy, lymph node biopsy     COLONOSCOPY  3/2017    Colonoscopy and GI     ESOPHAGOSCOPY, GASTROSCOPY, DUODENOSCOPY (EGD), COMBINED N/A 8/3/2018    Procedure: COMBINED ESOPHAGOSCOPY, GASTROSCOPY, DUODENOSCOPY (EGD), BIOPSY SINGLE OR MULTIPLE;;  Surgeon: Juan Woodson MD;  Location:  GI     NASAL/SINUS POLYPECTOMY  2017    Polyps were benign     TONSILLECTOMY       TONSILLECTOMY  1981     TUBAL LIGATION         PREVIOUS ENDOSCOPY:  EGD 8/2017: findings s/f EoE, 3 cm HH. Path showed 40 eos/HPF in distal and mid esophagus. Mild acute inflammation and occasional eos.   EGD 3/2017: EGD with ringed esophagus with distal bx showing increased eos, min eos in mid esophagus.   cscope 3/2017: small-mouthed, mild diverticulosis, 1 small hyperplastic polyp. Recommended to repeat cscope in 10 years (given path).     ALLERGIES:     Allergies   Allergen Reactions     Bee Venom Anaphylaxis, Difficulty breathing, Palpitations, Shortness Of Breath and Visual Disturbance     Patient does carry Epi-Pen     No Clinical Screening - See Comments Anaphylaxis     Chicken-Derived Products (Egg) Diarrhea and Nausea     Patient will self monitor  Other reaction(s): Nausea  Patient will self monitor  Other reaction(s): GI intolerance     Compazine [Prochlorperazine] Other (See Comments)     Extreme jittery     Gabapentin      Gluten Meal Other (See Comments)     Topiramate      Wheat Diarrhea     Wheat Bran Nausea     Patient will self monitor  Other reaction(s): Nausea  Patient will self monitor       PERTINENT MEDICATIONS:    Current Outpatient Prescriptions:      albuterol (PROAIR HFA/PROVENTIL HFA/VENTOLIN HFA) 108 (90 BASE) MCG/ACT Inhaler, Inhale 2 puffs into  the lungs every 6 hours as needed , Disp: , Rfl:      AMITRIPTYLINE HCL PO, Take 40 mg by mouth daily for two weeks, then increase to 50 mg daily., Disp: , Rfl:      buprenorphine (BUTRANS) 10 MCG/HR WK patch, Place 1 patch onto the skin every 7 days, Disp: 4 patch, Rfl: 2     butalbital-acetaminophen-caffeine (FIORICET/ESGIC) -40 MG per tablet, TAKE ONE TABLET BY MOUTH EVERY 4 HOURS AS NEEDED, Disp: , Rfl: 0     cetirizine (ZYRTEC) 10 MG tablet, Take 10 mg by mouth daily , Disp: , Rfl:      COMPRESSION STOCKINGS, 1 each daily 20-30 mmHg Thigh Length measure and fit, color and style per patient preference. Doff n jose per need., Disp: 3 each, Rfl: 3     DULOXETINE HCL PO, Take 60 mg by mouth in the morning and 30 mg in the evening., Disp: , Rfl:      eletriptan (RELPAX) 20 MG tablet, Take 1 tablet (20 mg) by mouth at onset of headache for migraine May repeat in 2 hours. Max 4 tablets/24 hours., Disp: 18 tablet, Rfl: 1     famotidine (PEPCID) 20 MG tablet, Take 1 tablet (20 mg) by mouth At Bedtime, Disp: 90 tablet, Rfl: 1     fludrocortisone (FLORINEF) 0.1 MG tablet, Take 2 tablets (0.2 mg) by mouth daily, Disp: 180 tablet, Rfl: 3     fluticasone (FLONASE) 50 MCG/ACT spray, INSTILL 2 SPRAYS INTO BOTH NOSTRILS DAILY, Disp: 48 mL, Rfl: 3     fluticasone-salmeterol (ADVAIR DISKUS) 250-50 MCG/DOSE diskus inhaler, Inhale 1 puff into the lungs 2 times daily as needed , Disp: , Rfl:      levothyroxine (SYNTHROID) 25 MCG tablet, Take 25 mcg by mouth daily, Disp: , Rfl:      linaclotide (LINZESS) 145 MCG capsule, Take 1 capsule (145 mcg) by mouth every morning (before breakfast) Last refill until GI clinic follow up., Disp: 30 capsule, Rfl: 0     medical cannabis (Patient's own supply.  Not a prescription), Take 1 Dose by mouth See Admin Instructions (This is NOT a prescription, and does not certify that the patient has a qualifying medical condition for medical cannabis.  The purpose of this order is  to document  that the patient reports taking medical cannabis.), Disp: , Rfl:      metoclopramide (REGLAN) 5 MG tablet, Take 1-2 tablets (5-10 mg) by mouth every 6 hours as needed, Disp: 180 tablet, Rfl: 1     Multiple Vitamins-Minerals (MULTIVITAMIN PO), Take 1 tablet by mouth daily , Disp: , Rfl:      Nerve Stimulator (CEFALY KIT) KAROLINE, 1 Device daily as needed, Disp: 1 Device, Rfl: 0     OLANZapine (ZYPREXA) 5 MG tablet, Take 1 tablet (5 mg) by mouth daily as needed (headache pain, nausea), Disp: 30 tablet, Rfl: 3     omeprazole (PRILOSEC) 20 MG CR capsule, Take 1 capsule (20 mg) by mouth daily, Disp: 90 capsule, Rfl: 2     omeprazole (PRILOSEC) 40 MG capsule, Take 1 capsule (40 mg) by mouth 2 times daily as needed, Disp: 60 capsule, Rfl: 1     ondansetron (ZOFRAN-ODT) 4 MG ODT tab, Take 1-2 tablets (4-8 mg) by mouth every 12 hours as needed for nausea, Disp: 60 tablet, Rfl: 1     oxyCODONE IR (ROXICODONE) 5 MG tablet, Take 0.5 tablets (2.5 mg) by mouth every 6 hours as needed for severe pain, Disp: 15 tablet, Rfl: 0     Probiotic Product (PROBIOTIC DAILY PO), Take 2 packets by mouth every morning , Disp: , Rfl:      Propranolol HCl SR Beads 120 MG CP24, Take 120 mg by mouth daily, Disp: 30 capsule, Rfl: 11     rizatriptan (MAXALT-MLT) 5 MG ODT tab, TAKE 1-2 TABLETS (5-10 MG) BY MOUTH AT ONSET OF HEADACHE FOR MIGRAINE (MAX 30 MG IN 24 HOURS), Disp: , Rfl: 6     SUMAtriptan (IMITREX STATDOSE) 6 MG/0.5ML pen injector kit, Inject 0.5 mLs (6 mg) Subcutaneous at onset of headache for migraine (may repeat once after 2 hrs) May repeat in 1 hour. Max 12 mg/24 hours., Disp: 2 kit, Rfl: 1     tiZANidine (ZANAFLEX) 4 MG tablet, Take 1 tablet (4 mg) by mouth 4 times daily as needed for muscle spasms, Disp: 120 tablet, Rfl: 3     traMADol (ULTRAM) 50 MG tablet, Take 1 tablet (50 mg) by mouth every 6 hours as needed for severe pain (Take scheduled every 6 hours for one week, then resume taking as needed), Disp: 60 tablet, Rfl: 0      VITAMIN D, CHOLECALCIFEROL, PO, Take 2,000 Units by mouth daily, Disp: , Rfl:      ZONISAMIDE PO, Take 75 mg by mouth daily, Disp: , Rfl:      ACETAMINOPHEN PO, Take 1,000 mg by mouth every 6 hours as needed for pain Take with tramadol, Disp: , Rfl:      EPINEPHrine (EPIPEN/ADRENACLICK/OR ANY BX GENERIC EQUIV) 0.3 MG/0.3ML injection 2-pack, Inject 0.3 mg into the muscle, Disp: , Rfl:     SOCIAL HISTORY:  Social History     Social History     Marital status:      Spouse name: Carry     Number of children: 5     Years of education: N/A     Occupational History     Not on file.     Social History Main Topics     Smoking status: Former Smoker     Packs/day: 0.20     Years: 10.00     Types: Cigarettes     Start date: 1/1/1991     Quit date: 12/1/2013     Smokeless tobacco: Never Used      Comment: I somked on and off during specified dates.     Alcohol use Yes      Comment: occasional     Drug use: No     Sexual activity: Yes     Partners: Male     Birth control/ protection: Female Surgical     Other Topics Concern     Parent/Sibling W/ Cabg, Mi Or Angioplasty Before 65f 55m? No     Social History Narrative    5 kids: 24, 23, 19, 13, 7 yo     works at [employer] for disability           FAMILY HISTORY:  Family History   Problem Relation Age of Onset     Connective Tissue Disorder Mother      eds     Connective Tissue Disorder Sister      eds     Cancer Father      Spinal tumor grew into spinal cord, went in brain     Migraines Father      Diabetes Maternal Grandmother      Elderly diabetes     HEART DISEASE Maternal Grandmother      Hypertension Maternal Grandmother      Diabetes Paternal Grandmother      Elderly diabetes     Diabetes Paternal Grandfather      Elderly diabetes     Asthma Sister      Pediatric Asthma     Migraines Sister      Asthma Son      Pediatric Asthma     Thyroid Disease Sister      ,     Migraines Sister      Migraines Sister      Breast Cancer No family hx of   "      Past/family/social history reviewed and no changes    PHYSICAL EXAMINATION:  Constitutional: aaox3, cooperative, pleasant, not dyspneic/diaphoretic, no acute distress  Vitals reviewed: /70 (BP Location: Left arm, Patient Position: Sitting, Cuff Size: Adult Large)  Pulse 72  Temp 98.2  F (36.8  C) (Oral)  Resp 16  Ht 1.71 m (5' 7.32\")  Wt 93.8 kg (206 lb 14.4 oz)  SpO2 97%  BMI 32.09 kg/m2  Wt:   Wt Readings from Last 2 Encounters:   08/08/18 93.8 kg (206 lb 14.4 oz)   07/24/18 93.4 kg (206 lb)      Eyes: Sclera anicteric/injected  Ears/nose/mouth/throat: Normal oropharynx without ulcers or exudate, mucus membranes moist, hearing intact  Neck: supple, thyroid normal size  CV: RRR. No edema  Respiratory: CTA bl. Unlabored breathing  Lymph: No axillary, submandibular, supraclavicular or inguinal lymphadenopathy  Abd: soft, nondistended, +bs, no hepatosplenomegaly, mild TTP with deep palpation of central abdomen. LLQ. No peritoneal signs  Skin: warm, perfused, no jaundice  Psych: Normal affect  MSK: Normal gait    PERTINENT STUDIES:  Labs 10/2017: normal CBC, TSH, ESR, CRP   GES 10/2017: rapid gastric emptying  Video swallow: mod HH (mixed type), deep penetration without aspiration.     Orders Only on 10/27/2017   Component Date Value Ref Range Status     Specimen Description 10/27/2017 Midstream Urine   Final     Special Requests 10/27/2017 Specimen received in preservative   Final     Culture Micro 10/27/2017 *  Final                    Value:10,000 to 50,000 colonies/mL  Escherichia coli       Color Urine 10/27/2017 [place]   Final     Appearance Urine 10/27/2017 Slightly Cloudy   Final     Glucose Urine 10/27/2017 Negative  NEG^Negative mg/dL Final     Bilirubin Urine 10/27/2017 Negative  NEG^Negative Final     Ketones Urine 10/27/2017 Negative  NEG^Negative mg/dL Final     Specific Gravity Urine 10/27/2017 1.021  1.003 - 1.035 Final     Blood Urine 10/27/2017 Negative  NEG^Negative Final     pH " Urine 10/27/2017 5.0  5.0 - 7.0 pH Final     Protein Albumin Urine 10/27/2017 Negative  NEG^Negative mg/dL Final     Urobilinogen mg/dL 10/27/2017 2.0  0.0 - 2.0 mg/dL Final     Nitrite Urine 10/27/2017 Negative  NEG^Negative Final     Leukocyte Esterase Urine 10/27/2017 Negative  NEG^Negative Final     Source 10/27/2017 Midstream Urine   Final     WBC Urine 10/27/2017 32* 0 - 2 /HPF Final     RBC Urine 10/27/2017 3* 0 - 2 /HPF Final     Squamous Epithelial /HPF Urine 10/27/2017 15* 0 - 1 /HPF Final     Transitional Epi 10/27/2017 1* FEW^Few /HPF Final     Mucous Urine 10/27/2017 Present* NEG^Negative /LPF Final

## 2018-08-08 NOTE — NURSING NOTE
Called to notify patient that Dr. Griffith would like for her to be on omeprazole until she is able to scope her in October.  Left message for patient. She is to call back with any questions.

## 2018-08-08 NOTE — PROGRESS NOTES
UROLOGY CLINIC NOTE  Ada Welch  [id number]    Reason for visit:   Follow-up of urinary incontinence, nocturia     HPI:   Ada Welch is a 47 year old woman with a complex past medical history including urinary urgency and associated incontinence.

## 2018-08-08 NOTE — MR AVS SNAPSHOT
After Visit Summary   8/8/2018    Ada Welch    MRN: [id number]           Patient Information     Date Of Birth          1970        Visit Information        Provider Department      8/8/2018 1:00 PM Juana Griffith MD [facility]        Today's Diagnoses     Eosinophilic esophagitis    -  1      Care Instructions    --Omeprazole 40 mg twice daily (take on an empty stomach 30 minutes before food) x 8 weeks. We will work on the pre-auth.   --Repeat EGD in 8 weeks   --Stick to a soft/puree diet    Return for follow up in 3 months           Follow-ups after your visit        Additional Services     GASTROENTEROLOGY ADULT REF PROCEDURE ONLY                 Follow-up notes from your care team     Return in about 3 months (around 11/8/2018).      Your next 10 appointments already scheduled     Aug 08, 2018  2:15 PM CDT   (Arrive by 2:00 PM)   Return Visit with Wade Singh MD   [facility] ([facility])    [address] [place] [id number] [phone]            Aug 13, 2018  1:40 PM CDT   (Arrive by 1:25 PM)   NEW THROAT with Seema Eubanks MD   [facility] ([facility])    [address] [place] [id number] [phone]           This is a multi-disciplinary care team visit as patients with your type of problem are usually seen by a team of an MD and a Speech-Language Pathologist (who is a specialist in disorders of the voice, throat, and breathing).  Please plan about 2 hours for your visit, which will likely include Laryngeal Function Studies, a Voice/Swallow/Breathing Evaluation, and an Endoscopic Laryngeal Examination to provide a comprehensive evaluation.  Please check with your insurance company to ensure you are covered for these services. - It is important to know that if you  are evaluated and/or treated by both a physician and a speech pathologist during your visit, your billing will reflect the input that you receive from both providers as separate professionals. Although most insurance plans do cover these services, we encourage you to contact your insurance company prior to your visit to determine whether there are any coverage limitations that might affect you financially. - Billing/procedure codes that are frequently associated with visits to our clinic include (but are not limited to) the ones listed below. Most patients will not need all of these items, but it may be useful to ask your insurance company's patient . 82689: Flexible fiberoptic laryngoscopy, 86590: Laryngoscopy; flexible or rigid fiberoptic, with stroboscopy, 19244: Flexible endoscopic evaluation of swallowing, 42498: Laryngeal function aerodynamic evaluation, 50370: Evaluation of Voice and Resonance, 76895: Speech pathology treatment for voice, speech, communication, 82282: Speech pathology treatment for swallowing/oral function for feeding - If you have any concerns or questions, or if you would prefer not to have a speech pathologist involved in your visit, please contact our Clinic Coordinator at [phone], at least 24 hours prior to your appointment.            Aug 13, 2018  1:40 PM CDT   (Arrive by 1:25 PM)   [facility] Speech Pathology and ENT Evaluation with  Ent Dysphonia Slp Provider   [facility] ([facility])    [address] [place] [id number] [phone]            Aug 13, 2018  3:25 PM CDT   (Arrive by 3:10 PM)   Return Visit with Shilpa Galicia MD   [facility] ([facility])    [address] [place] [id number] [phone]            Aug 15, 2018  2:45 PM CDT   (Arrive by 2:30 PM)   Return Visit with Zoe Barahona MD   [facility] Ear Nose [facility] Throat (  [facility])    [address] [place] [phone]   [phone]            Aug 22, 2018  3:40 PM CDT   (Arrive by 3:25 PM)   Return Visit with Juana Griffith MD   [facility] ([facility])    [address] [place] [address]   [phone]            Sep 13, 2018  1:50 PM CDT   (Arrive by 1:35 PM)   RETURN ARRHYTHMIA with Kirk Russell MD   [facility] ([facility])    [address] [place] [address]  [address] [place] [id number] [phone]            Oct 22, 2018  7:35 AM CDT   (Arrive by 7:20 AM)   New Allergy with Vahe Rodriguez MD   [facility] ([facility])    [address] [place] [phone]           Do not take anti-histamines or Zantac for seven day prior to your appointment.            Oct 25, 2018  9:25 AM CDT   (Arrive by 9:10 AM)   Return Visit with Ellyn Rivera MD   [facility] ([facility])    [address] [place] [id number] [phone]              Who to contact     Please call your clinic at [phone] to:    Ask questions about your health    Make or cancel appointments    Discuss your medicines    Learn about your test results    Speak to your doctor            Additional Information About Your Visit        [email or web address] Information     [email or web address] gives you secure access to your electronic health record. If you see a primary care provider, you can also send messages to your care team and make appointments. If you have questions, please call your primary care clinic.  If you do not have a primary care provider, please call [phone] and they will assist you.      [email or web address] is an electronic gateway that provides easy, online access to your medical records.  "With [facility], you can request a clinic appointment, read your test results, renew a prescription or communicate with your care team.     To access your existing account, please contact your [facility] or call [phone] for assistance.        Care EveryWhere ID     This is your Care EveryWhere ID. This could be used by other organizations to access your [facility] medical records  [id number]        Your Vitals Were     Pulse Temperature Respirations Height Pulse Oximetry BMI (Body Mass Index)    72 98.2  F (36.8  C) (Oral) 16 1.71 m (5' 7.32\") 97% 32.09 kg/m2       Blood Pressure from Last 3 Encounters:   08/08/18 105/70   08/03/18 102/73   07/25/18 118/70    Weight from Last 3 Encounters:   08/08/18 93.8 kg (206 lb 14.4 oz)   07/24/18 93.4 kg (206 lb)   07/17/18 92.1 kg (203 lb)              We Performed the Following     GASTROENTEROLOGY ADULT REF PROCEDURE ONLY          Today's Medication Changes          These changes are accurate as of 8/8/18  1:59 PM.  If you have any questions, ask your nurse or doctor.               These medicines have changed or have updated prescriptions.        Dose/Directions    * omeprazole 20 MG CR capsule   Commonly known as:  priLOSEC   This may have changed:  Another medication with the same name was added. Make sure you understand how and when to take each.   Used for:  Hiatal hernia   Changed by:  Juana Griffith MD        Dose:  20 mg   Take 1 capsule (20 mg) by mouth daily   Quantity:  90 capsule   Refills:  2       * omeprazole 40 MG capsule   Commonly known as:  priLOSEC   This may have changed:  You were already taking a medication with the same name, and this prescription was added. Make sure you understand how and when to take each.   Used for:  Eosinophilic esophagitis   Changed by:  Juana Griffith MD        Dose:  40 mg   Take 1 capsule (40 mg) by mouth 2 times daily as needed   Quantity:  60 capsule   Refills:  1       * Notice:  This " list has 2 medication(s) that are the same as other medications prescribed for you. Read the directions carefully, and ask your doctor or other care provider to review them with you.         Where to get your medicines      These medications were sent to [facility] [id number] [facility] [place] - [address]  [address] [place] [address]     Phone:  [phone]     omeprazole 40 MG capsule                Primary Care Provider Office Phone # Fax #    Ellyn Rivera [phone]       [address] [address]        Equal Access to Services     [facility]: Hadii aad ku hadasho Soomaali, waaxda luqadaha, qaybta kaalmada eileen, odette dykes . So [facility] [phone].    ATENCIÓN: Si habla español, tiene a sweeney disposición servicios gratuitos de asistencia lingüística. [facility] [phone].    We comply with applicable federal civil rights laws and [place] laws. We do not discriminate on the basis of race, color, national origin, age, disability, sex, sexual orientation, or gender identity.            Thank you!     Thank you for choosing [facility]  for your care. Our goal is always to provide you with excellent care. Hearing back from our patients is one way we can continue to improve our services. Please take a few minutes to complete the written survey that you may receive in the mail after your visit with us. Thank you!             Your Updated Medication List - Protect others around you: Learn how to safely use, store and throw away your medicines at www.disposemymeds.org.          This list is accurate as of 8/8/18  1:59 PM.  Always use your most recent med list.                   Brand Name Dispense Instructions for use Diagnosis    ACETAMINOPHEN PO      Take 1,000 mg by mouth every 6 hours as needed for pain Take with tramadol        ADVAIR DISKUS 250-50 MCG/DOSE diskus inhaler   Generic drug:   fluticasone-salmeterol      Inhale 1 puff into the lungs 2 times daily as needed        albuterol 108 (90 Base) MCG/ACT Inhaler    PROAIR HFA/PROVENTIL HFA/VENTOLIN HFA     Inhale 2 puffs into the lungs every 6 hours as needed        AMITRIPTYLINE HCL PO      Take 40 mg by mouth daily for two weeks, then increase to 50 mg daily.        buprenorphine 10 MCG/HR WK patch    BUTRANS    4 patch    Place 1 patch onto the skin every 7 days    Postlaminectomy syndrome of lumbar region       butalbital-acetaminophen-caffeine -40 MG per tablet    FIORICET/ESGIC     TAKE ONE TABLET BY MOUTH EVERY 4 HOURS AS NEEDED        CEFALY KIT Pauly     1 Device    1 Device daily as needed    Intractable chronic migraine without aura and without status migrainosus       cetirizine 10 MG tablet    zyrTEC     Take 10 mg by mouth daily        COMPRESSION STOCKINGS     3 each    1 each daily 20-30 mmHg Thigh Length measure and fit, color and style per patient preference. Doff n jose per need.    Orthostatic hypotension       DULOXETINE HCL PO      Take 60 mg by mouth in the morning and 30 mg in the evening.        eletriptan 20 MG tablet    RELPAX    18 tablet    Take 1 tablet (20 mg) by mouth at onset of headache for migraine May repeat in 2 hours. Max 4 tablets/24 hours.    Chronic daily headache, Intractable migraine without aura and with status migrainosus       EPINEPHrine 0.3 MG/0.3ML injection 2-pack    EPIPEN/ADRENACLICK/or ANY BX GENERIC EQUIV     Inject 0.3 mg into the muscle        famotidine 20 MG tablet    PEPCID    90 tablet    Take 1 tablet (20 mg) by mouth At Bedtime    Hiatal hernia, Mast cell disorder       fludrocortisone 0.1 MG tablet    FLORINEF    180 tablet    Take 2 tablets (0.2 mg) by mouth daily    Autonomic dysfunction, Pre-syncope       fluticasone 50 MCG/ACT spray    FLONASE    48 mL    INSTILL 2 SPRAYS INTO BOTH NOSTRILS DAILY    Chronic rhinitis, unspecified type       linaclotide 145 MCG capsule     LINZESS    30 capsule    Take 1 capsule (145 mcg) by mouth every morning (before breakfast) Last refill until GI clinic follow up.    Chronic idiopathic constipation       medical cannabis (Patient's own supply.  Not a prescription)      Take 1 Dose by mouth See Admin Instructions (This is NOT a prescription, and does not certify that the patient has a qualifying medical condition for medical cannabis.  The purpose of this order is  to document that the patient reports taking medical cannabis.)        metoclopramide 5 MG tablet    REGLAN    180 tablet    Take 1-2 tablets (5-10 mg) by mouth every 6 hours as needed    Intractable chronic migraine without aura and without status migrainosus       MULTIVITAMIN PO      Take 1 tablet by mouth daily        OLANZapine 5 MG tablet    zyPREXA    30 tablet    Take 1 tablet (5 mg) by mouth daily as needed (headache pain, nausea)    Other migraine with status migrainosus, intractable, Intractable chronic migraine without aura and without status migrainosus       * omeprazole 20 MG CR capsule    priLOSEC    90 capsule    Take 1 capsule (20 mg) by mouth daily    Hiatal hernia       * omeprazole 40 MG capsule    priLOSEC    60 capsule    Take 1 capsule (40 mg) by mouth 2 times daily as needed    Eosinophilic esophagitis       ondansetron 4 MG ODT tab    ZOFRAN-ODT    60 tablet    Take 1-2 tablets (4-8 mg) by mouth every 12 hours as needed for nausea    Migraine without status migrainosus, not intractable, unspecified migraine type       oxyCODONE IR 5 MG tablet    ROXICODONE    15 tablet    Take 0.5 tablets (2.5 mg) by mouth every 6 hours as needed for severe pain    Postlaminectomy syndrome       PROBIOTIC DAILY PO      Take 2 packets by mouth every morning        Propranolol HCl SR Beads 120 MG Cp24     30 capsule    Take 120 mg by mouth daily    Sinus tachycardia       rizatriptan 5 MG ODT tab    MAXALT-MLT     TAKE 1-2 TABLETS (5-10 MG) BY MOUTH AT ONSET OF HEADACHE FOR  MIGRAINE (MAX 30 MG IN 24 HOURS)        SUMAtriptan 6 MG/0.5ML pen injector kit    IMITREX STATDOSE    2 kit    Inject 0.5 mLs (6 mg) Subcutaneous at onset of headache for migraine (may repeat once after 2 hrs) May repeat in 1 hour. Max 12 mg/24 hours.    Chronic migraine without aura without status migrainosus, not intractable       SYNTHROID 25 MCG tablet   Generic drug:  levothyroxine      Take 25 mcg by mouth daily        tiZANidine 4 MG tablet    ZANAFLEX    120 tablet    Take 1 tablet (4 mg) by mouth 4 times daily as needed for muscle spasms        traMADol 50 MG tablet    ULTRAM    60 tablet    Take 1 tablet (50 mg) by mouth every 6 hours as needed for severe pain (Take scheduled every 6 hours for one week, then resume taking as needed)    Postlaminectomy syndrome       VITAMIN D (CHOLECALCIFEROL) PO      Take 2,000 Units by mouth daily        ZONISAMIDE PO      Take 75 mg by mouth daily        * Notice:  This list has 2 medication(s) that are the same as other medications prescribed for you. Read the directions carefully, and ask your doctor or other care provider to review them with you.

## 2018-08-08 NOTE — NURSING NOTE
"Chief Complaint   Patient presents with     Follow Up For     history of night time urgency with incontinence coming in for 6 month follow up       Blood pressure 105/70, pulse 72, height 1.71 m (5' 7.32\"), weight 93.5 kg (206 lb 2.1 oz), not currently breastfeeding. Body mass index is 31.98 kg/(m^2).    Patient Active Problem List   Diagnosis     Pain syndrome, chronic     s/p TLIF L4-5,L5-S1 with solid arthrodesis     Tom-Danlos syndrome     Postlaminectomy syndrome, lumbar region     Headache     Cervicalgia     Slow transit constipation     Urinary urgency     Nocturia     Cardiac device in situ- [employer] Implanted Loop Recorder (ILR)     Acquired hypothyroidism     Allergic rhinitis     Anxiety     Anxiety disorder     Astigmatism     Chronic sinusitis with recurrent bronchitis     Arthritis of facet joints at multiple vertebral levels (H)     Arthritis involving multiple sites     Opioid dependence (H)     Cyst of fallopian tube     Depression     DNS (deviated nasal septum)     Dysuria     Easy bruising     Encounter for genetic counseling and testing     Essential hypertension with goal blood pressure less than 140/90     Head and neck lymphadenopathy     Hepatomegaly     Keratoconjunctivitis sicca (H)     Low back pain radiating to both legs     Liver lesion     Low back pain     Low back pain of multiple sites of spine with sciatica     Lumbar degenerative disc disease     Lumbar radiculopathy     Lymphadenopathy of left cervical region     Intractable chronic migraine without aura and without status migrainosus     Nasal turbinate hypertrophy     Non compliance with medical treatment     Obese     Opioid type dependence, continuous (H)     Other acute postoperative pain     Pain disorder associated with psychological and physical factors     Presbyopia     Spondylolisthesis of lumbar region     Sinus tachycardia     Uterine endometriosis     Autonomic dysfunction     Migraine     Intractable migraine " with status migrainosus       Allergies   Allergen Reactions     Bee Venom Anaphylaxis, Difficulty breathing, Palpitations, Shortness Of Breath and Visual Disturbance     Patient does carry Epi-Pen     No Clinical Screening - See Comments Anaphylaxis     Chicken-Derived Products (Egg) Diarrhea and Nausea     Patient will self monitor  Other reaction(s): Nausea  Patient will self monitor  Other reaction(s): GI intolerance     Compazine [Prochlorperazine] Other (See Comments)     Extreme jittery     Gabapentin      Gluten Meal Other (See Comments)     Topiramate      Wheat Diarrhea     Wheat Bran Nausea     Patient will self monitor  Other reaction(s): Nausea  Patient will self monitor       Current Outpatient Prescriptions   Medication Sig Dispense Refill     ACETAMINOPHEN PO Take 1,000 mg by mouth every 6 hours as needed for pain Take with tramadol       albuterol (PROAIR HFA/PROVENTIL HFA/VENTOLIN HFA) 108 (90 BASE) MCG/ACT Inhaler Inhale 2 puffs into the lungs every 6 hours as needed        AMITRIPTYLINE HCL PO Take 40 mg by mouth daily for two weeks, then increase to 50 mg daily.       buprenorphine (BUTRANS) 10 MCG/HR WK patch Place 1 patch onto the skin every 7 days 4 patch 2     butalbital-acetaminophen-caffeine (FIORICET/ESGIC) -40 MG per tablet TAKE ONE TABLET BY MOUTH EVERY 4 HOURS AS NEEDED  0     cetirizine (ZYRTEC) 10 MG tablet Take 10 mg by mouth daily        COMPRESSION STOCKINGS 1 each daily 20-30 mmHg Thigh Length measure and fit, color and style per patient preference. Doshar garcia per need. 3 each 3     DULOXETINE HCL PO Take 60 mg by mouth in the morning and 30 mg in the evening.       eletriptan (RELPAX) 20 MG tablet Take 1 tablet (20 mg) by mouth at onset of headache for migraine May repeat in 2 hours. Max 4 tablets/24 hours. 18 tablet 1     EPINEPHrine (EPIPEN/ADRENACLICK/OR ANY BX GENERIC EQUIV) 0.3 MG/0.3ML injection 2-pack Inject 0.3 mg into the muscle       famotidine (PEPCID) 20 MG  tablet Take 1 tablet (20 mg) by mouth At Bedtime 90 tablet 1     fludrocortisone (FLORINEF) 0.1 MG tablet Take 2 tablets (0.2 mg) by mouth daily 180 tablet 3     fluticasone (FLONASE) 50 MCG/ACT spray INSTILL 2 SPRAYS INTO BOTH NOSTRILS DAILY 48 mL 3     fluticasone-salmeterol (ADVAIR DISKUS) 250-50 MCG/DOSE diskus inhaler Inhale 1 puff into the lungs 2 times daily as needed        levothyroxine (SYNTHROID) 25 MCG tablet Take 25 mcg by mouth daily       linaclotide (LINZESS) 145 MCG capsule Take 1 capsule (145 mcg) by mouth every morning (before breakfast) Last refill until GI clinic follow up. 30 capsule 0     medical cannabis (Patient's own supply.  Not a prescription) Take 1 Dose by mouth See Admin Instructions (This is NOT a prescription, and does not certify that the patient has a qualifying medical condition for medical cannabis.  The purpose of this order is  to document that the patient reports taking medical cannabis.)       metoclopramide (REGLAN) 5 MG tablet Take 1-2 tablets (5-10 mg) by mouth every 6 hours as needed 180 tablet 1     Multiple Vitamins-Minerals (MULTIVITAMIN PO) Take 1 tablet by mouth daily        Nerve Stimulator (CEFALY KIT) KAROLINE 1 Device daily as needed 1 Device 0     OLANZapine (ZYPREXA) 5 MG tablet Take 1 tablet (5 mg) by mouth daily as needed (headache pain, nausea) 30 tablet 3     omeprazole (PRILOSEC) 20 MG CR capsule Take 1 capsule (20 mg) by mouth daily 90 capsule 2     omeprazole (PRILOSEC) 40 MG capsule Take 1 capsule (40 mg) by mouth 2 times daily as needed 60 capsule 3     ondansetron (ZOFRAN-ODT) 4 MG ODT tab Take 1-2 tablets (4-8 mg) by mouth every 12 hours as needed for nausea 60 tablet 1     oxyCODONE IR (ROXICODONE) 5 MG tablet Take 0.5 tablets (2.5 mg) by mouth every 6 hours as needed for severe pain 15 tablet 0     Probiotic Product (PROBIOTIC DAILY PO) Take 2 packets by mouth every morning        Propranolol HCl SR Beads 120 MG CP24 Take 120 mg by mouth daily 30  capsule 11     rizatriptan (MAXALT-MLT) 5 MG ODT tab TAKE 1-2 TABLETS (5-10 MG) BY MOUTH AT ONSET OF HEADACHE FOR MIGRAINE (MAX 30 MG IN 24 HOURS)  6     SUMAtriptan (IMITREX STATDOSE) 6 MG/0.5ML pen injector kit Inject 0.5 mLs (6 mg) Subcutaneous at onset of headache for migraine (may repeat once after 2 hrs) May repeat in 1 hour. Max 12 mg/24 hours. 2 kit 1     tiZANidine (ZANAFLEX) 4 MG tablet Take 1 tablet (4 mg) by mouth 4 times daily as needed for muscle spasms 120 tablet 3     traMADol (ULTRAM) 50 MG tablet Take 1 tablet (50 mg) by mouth every 6 hours as needed for severe pain (Take scheduled every 6 hours for one week, then resume taking as needed) 60 tablet 0     VITAMIN D, CHOLECALCIFEROL, PO Take 2,000 Units by mouth daily       ZONISAMIDE PO Take 75 mg by mouth daily         Social History   Substance Use Topics     Smoking status: Former Smoker     Packs/day: 0.20     Years: 10.00     Types: Cigarettes     Start date: 1/1/1991     Quit date: 12/1/2013     Smokeless tobacco: Never Used      Comment: I somked on and off during specified dates.     Alcohol use Yes      Comment: occasional       Jeri Granados LPN  8/8/2018  2:13 PM

## 2018-08-08 NOTE — PATIENT INSTRUCTIONS
--Omeprazole 40 mg twice daily (take on an empty stomach 30 minutes before food) x 8 weeks. We will work on the pre-auth.   --Repeat EGD in 8 weeks   --Stick to a soft/puree diet    Return for follow up in 3 months

## 2018-08-08 NOTE — LETTER
8/8/2018       RE: Ada Welch  [address] [place] [address]     Dear Colleague,    Thank you for referring your patient, Ada Welch, to the [facility] GASTROENTEROLOG[facility] IBD [facility]INIC at [facility]. Please see a copy of my visit note below.    GI CLINIC VISIT    CC/REFERRING MD:  Jerad Hebert  REASON FOR CONSULTATION:   Jerad Hebert for   Chief Complaint   Patient presents with     Gastrointestinal Problem     F/U for IBS and pelvic floor results       ASSESSMENT/PLAN:  Ms. 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 Of note, she was previously seen for dysphagia and esophageal eosinophilia by Dr. Roland, who thought that the findings may be reflux-related in the setting of a hiatal hernia.     Regarding dysphagia, this may be EoE or PPI-REE. She has not tried high dose PPI therapy. Will plan for this x 8 weeks with repeat EGD. In the meantime, Mrs. Welch will follow a mostly puree diet and will avoid meat.     Regarding her constipation, the Linzess is working very well and she has regular, daily BMs now. Her PFC evaluation was s/f rectal intussusception. She plans to undergo biofeedback therapy in October and may need surgical correction in the future.     # Esophageal eosinophilia  --Omeprazole 40 mg BID x 8 weeks with repeat EGD in 8 weeks to check for PPI-REE. If there is persistent eosinophilia, will plan for swallowed budesonide with possible modified elimination diet.     # Rectal intussusception   # Pelvic floor dysfunction  # Tom-Danlos syndroe  --Biofeedback therapy in October through the [facility]. Continue to work with [facility] regarding possible need for surgical repair.   --Continue Linzess    RTC: Return in about 3 months (around 11/8/2018).     A total of 40  "minutes, face to face, was spent with this patient, >50% of which was counseling regarding the above delineated issues.    Juana Griffith MD   of Medicine  Division of Gastroenterology, Hepatology and Nutrition  [facility]      HPI  Ms. Welch is a 47 yo woman with Tom Danlos syndrome presenting for evaluation of \"stomach issues\".  She is in the process of work up for mastocytosis (urine showed elevated histamine levels, but patient was on antacid therapy and Mobic at that time). PMHx also includes 5 vaginal deliveries, migraines, cervicalgia, chronic pain syndrome, degenerative joint disease, chronic pan sinusitis.  She is referred to GI clinic for evaluation of possible IBS with mixed stools. Ms. Welch presents with her daughters (Daniel and Magda) today.     Ms. Welch reports lifelong symptoms of abdominal distension, bloating and borborygmi shortly after eating.  Stools alternates between loose stools and constipation. Usually, she has constipation in the form of hard stools and  incomplete evacuation. Performs splinting (applies posterior vaginal wall pressure with fingers) to help evacuate stool. Had \"pelvic floor releases\" with PT in [place], [place] and \"[facility]\" in [place], [place] with some help. Period of constipation will last for 1 month; during this time, will have 1 BM weekly with Miralax. Endorses lower abdominal pain that worsens with constipation.  Previously failed Miralax, senna, dulcolax, fiber, suppositories.  Period of constipation is then followed by several days of loose stools. Ms. Welch endorses occasional BRB with straining in the toilet and TP x 20 years. Cscope 3/2017 showed small mouthed diverticulosis in the sigmoid and 1 hyperplastic polyp; repeat cscope in 10 years was the recommendation. Uses buprenorphine patch (opioid) x 1 year.     In addition to constipation, there are some trigger foods for abdominal pain: eggs, milk, fatty foods.   Gall " bladder is still in place. Has had weight gain since summer 2017. Endorses hair loss. TSH was normal 10/5/2017.     Takes prilosec during the day and pepcid at night with adequate heartburn control.     Interval history  During the spring time, developed dysphagia. This was assessed with EGD that was concerning for EoE grossly. She had been on prilosec 20mg + Pepcid prior to the scope. Had not olga on twice daily PPI prior to scope. Per Dr. Roland's assessment in 2017, the thought was that the eosinophilia (previously seen on prior endoscopy) may be 2/2 reflux in the setting of a hiatal hernia.     Currently, endorses continued dysphagia. This is especially with dry meat. Avoids this by eating minced, moist meat. Liquid, purees is completely OK.     Saw PFC with plans to undergo biofeedback therapy in October. Rectal intussusception was noted and will likely need surgical repair in the future. Linzess has been very helpful with constipation and bloating. Having a daily BM.     ROS:    No fevers or chills  No weight loss  No blurry vision, double vision or change in vision  No sore throat  No lymphadenopathy  No headache, paraesthesias, or weakness in a limb  No shortness of breath or wheezing  No chest pain or pressure  No rashes or skin changes  No odynophagia or dysphagia  No melena  No dysuria, frequency or urgency  No hot/cold intolerance or polyria  No anxiety or depression    PROBLEM LIST  Patient Active Problem List    Diagnosis Date Noted     Intractable migraine with status migrainosus 07/24/2018     Priority: Medium     Migraine 07/07/2018     Priority: Medium     Autonomic dysfunction 06/07/2018     Priority: Medium     Low back pain of multiple sites of spine with sciatica 12/09/2017     Priority: Medium     Intractable chronic migraine without aura and without status migrainosus 12/09/2017     Priority: Medium     Non compliance with medical treatment 12/09/2017     Priority: Medium     Obese 12/09/2017      Priority: Medium     Slow transit constipation 08/30/2017     Priority: Medium     Urinary urgency 08/30/2017     Priority: Medium     Nocturia 08/30/2017     Priority: Medium     Cardiac device in situ- [employer] Implanted Loop Recorder (ILR) 08/30/2017     Priority: Medium     Cervicalgia 08/25/2017     Priority: Medium     Headache 07/25/2017     Priority: Medium     Postlaminectomy syndrome, lumbar region 06/14/2017     Priority: Medium     Lymphadenopathy of left cervical region 03/27/2017     Priority: Medium     Easy bruising 10/13/2016     Priority: Medium     Head and neck lymphadenopathy 10/13/2016     Priority: Medium     Hepatomegaly 10/13/2016     Priority: Medium     Liver lesion 10/13/2016     Priority: Medium     Pain syndrome, chronic 09/05/2016     Priority: Medium     s/p TLIF L4-5,L5-S1 with solid arthrodesis 09/05/2016     Priority: Medium     Tom-Danlos syndrome 09/05/2016     Priority: Medium     Acquired hypothyroidism 07/22/2016     Priority: Medium     Anxiety 07/22/2016     Priority: Medium     Opioid dependence (H) 07/22/2016     Priority: Medium     Overview:   Treatment Agreement       Essential hypertension with goal blood pressure less than 140/90 07/22/2016     Priority: Medium     Low back pain 07/12/2016     Priority: Medium     Lumbar radiculopathy 02/18/2016     Priority: Medium     Encounter for genetic counseling and testing 01/05/2016     Priority: Medium     Overview:     [employer] Aortopathy Comprehensive Panel ordered 1/5/2016.       Sinus tachycardia 09/29/2015     Priority: Medium     Astigmatism 12/12/2014     Priority: Medium     Keratoconjunctivitis sicca (H) 12/12/2014     Priority: Medium     Presbyopia 12/12/2014     Priority: Medium     Opioid type dependence, continuous (H) 10/09/2014     Priority: Medium     Other acute postoperative pain 10/09/2014     Priority: Medium     Anxiety disorder 08/21/2014     Priority: Medium     Depression 08/21/2014     Priority:  Medium     Pain disorder associated with psychological and physical factors 08/21/2014     Priority: Medium     Low back pain radiating to both legs 07/08/2014     Priority: Medium     Arthritis of facet joints at multiple vertebral levels (H) 01/01/2014     Priority: Medium     Spondylolisthesis of lumbar region 12/10/2013     Priority: Medium     Lumbar degenerative disc disease 11/21/2013     Priority: Medium     DNS (deviated nasal septum) 10/09/2012     Priority: Medium     Nasal turbinate hypertrophy 10/09/2012     Priority: Medium     Dysuria 07/12/2011     Priority: Medium     Chronic sinusitis with recurrent bronchitis 02/01/2007     Priority: Medium     Allergic rhinitis 01/09/2007     Priority: Medium     Cyst of fallopian tube 12/28/1996     Priority: Medium     Arthritis involving multiple sites 01/01/1986     Priority: Medium     Uterine endometriosis 01/01/1986     Priority: Medium       PERTINENT 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     Chronic sinusitis 00/2007    Diagnosed with chronic pansinusitis 2016     Chronic tonsillitis 1980    Had tonsils removed 02/1981, rheumatic fever     Tom-Danlos disease      Gastroesophageal reflux disease 3/1/2017    I have large hiatal hernia     Hernia, abdominal 10/2016    Hiatal Hernia     Hiatal hernia 2016    Have adeline hiatel hernia     Hoarseness Unsure    It comes and goes     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     Migraines 1/1/2007    Unsure of exact date     Nasal polyps 2013    Were revoved 03/2017, benign     Other nervous system complications 1/2014    Autonomic nervous system disorder, neuralgia, neuropathy     Pneumonia  Unsure    Have had pneumonia several times     Swelling, mass, or lump in head and neck 08/2016    Lymph node has been growing for last year     Thyroid disease 01/01/2008   Was told she had an ulcer at age 15 in the setting of life stress. Not sure if she had an EGD at that time.       PREVIOUS SURGERIES:  Past Surgical History:   Procedure Laterality Date     ADENOIDECTOMY  1981     AS REPAIR OF NASAL SEPTUM       BACK SURGERY  12/09/2013  10/01/2014    Spinal fusion L4-S1, L5 moving 5/8ths in. Remove screws/rods     BIOPSY  01/01/2008 & 3/2017    mole biopsy, lymph node biopsy     COLONOSCOPY  3/2017    Colonoscopy and GI     ESOPHAGOSCOPY, GASTROSCOPY, DUODENOSCOPY (EGD), COMBINED N/A 8/3/2018    Procedure: COMBINED ESOPHAGOSCOPY, GASTROSCOPY, DUODENOSCOPY (EGD), BIOPSY SINGLE OR MULTIPLE;;  Surgeon: Juan Woodson MD;  Location:  GI     NASAL/SINUS POLYPECTOMY  2017    Polyps were benign     TONSILLECTOMY       TONSILLECTOMY  1981     TUBAL LIGATION         PREVIOUS ENDOSCOPY:  EGD 8/2017: findings s/f EoE, 3 cm HH. Path showed 40 eos/HPF in distal and mid esophagus. Mild acute inflammation and occasional eos.   EGD 3/2017: EGD with ringed esophagus with distal bx showing increased eos, min eos in mid esophagus.   cscope 3/2017: small-mouthed, mild diverticulosis, 1 small hyperplastic polyp. Recommended to repeat cscope in 10 years (given path).     ALLERGIES:     Allergies   Allergen Reactions     Bee Venom Anaphylaxis, Difficulty breathing, Palpitations, Shortness Of Breath and Visual Disturbance     Patient does carry Epi-Pen     No Clinical Screening - See Comments Anaphylaxis     Chicken-Derived Products (Egg) Diarrhea and Nausea     Patient will self monitor  Other reaction(s): Nausea  Patient will self monitor  Other reaction(s): GI intolerance     Compazine [Prochlorperazine] Other (See Comments)     Extreme jittery     Gabapentin      Gluten Meal Other (See Comments)     Topiramate      Wheat  Diarrhea     Wheat Bran Nausea     Patient will self monitor  Other reaction(s): Nausea  Patient will self monitor       PERTINENT MEDICATIONS:    Current Outpatient Prescriptions:      albuterol (PROAIR HFA/PROVENTIL HFA/VENTOLIN HFA) 108 (90 BASE) MCG/ACT Inhaler, Inhale 2 puffs into the lungs every 6 hours as needed , Disp: , Rfl:      AMITRIPTYLINE HCL PO, Take 40 mg by mouth daily for two weeks, then increase to 50 mg daily., Disp: , Rfl:      buprenorphine (BUTRANS) 10 MCG/HR WK patch, Place 1 patch onto the skin every 7 days, Disp: 4 patch, Rfl: 2     butalbital-acetaminophen-caffeine (FIORICET/ESGIC) -40 MG per tablet, TAKE ONE TABLET BY MOUTH EVERY 4 HOURS AS NEEDED, Disp: , Rfl: 0     cetirizine (ZYRTEC) 10 MG tablet, Take 10 mg by mouth daily , Disp: , Rfl:      COMPRESSION STOCKINGS, 1 each daily 20-30 mmHg Thigh Length measure and fit, color and style per patient preference. Doff juanjose garcia per need., Disp: 3 each, Rfl: 3     DULOXETINE HCL PO, Take 60 mg by mouth in the morning and 30 mg in the evening., Disp: , Rfl:      eletriptan (RELPAX) 20 MG tablet, Take 1 tablet (20 mg) by mouth at onset of headache for migraine May repeat in 2 hours. Max 4 tablets/24 hours., Disp: 18 tablet, Rfl: 1     famotidine (PEPCID) 20 MG tablet, Take 1 tablet (20 mg) by mouth At Bedtime, Disp: 90 tablet, Rfl: 1     fludrocortisone (FLORINEF) 0.1 MG tablet, Take 2 tablets (0.2 mg) by mouth daily, Disp: 180 tablet, Rfl: 3     fluticasone (FLONASE) 50 MCG/ACT spray, INSTILL 2 SPRAYS INTO BOTH NOSTRILS DAILY, Disp: 48 mL, Rfl: 3     fluticasone-salmeterol (ADVAIR DISKUS) 250-50 MCG/DOSE diskus inhaler, Inhale 1 puff into the lungs 2 times daily as needed , Disp: , Rfl:      levothyroxine (SYNTHROID) 25 MCG tablet, Take 25 mcg by mouth daily, Disp: , Rfl:      linaclotide (LINZESS) 145 MCG capsule, Take 1 capsule (145 mcg) by mouth every morning (before breakfast) Last refill until GI clinic follow up., Disp: 30 capsule,  Rfl: 0     medical cannabis (Patient's own supply.  Not a prescription), Take 1 Dose by mouth See Admin Instructions (This is NOT a prescription, and does not certify that the patient has a qualifying medical condition for medical cannabis.  The purpose of this order is  to document that the patient reports taking medical cannabis.), Disp: , Rfl:      metoclopramide (REGLAN) 5 MG tablet, Take 1-2 tablets (5-10 mg) by mouth every 6 hours as needed, Disp: 180 tablet, Rfl: 1     Multiple Vitamins-Minerals (MULTIVITAMIN PO), Take 1 tablet by mouth daily , Disp: , Rfl:      Nerve Stimulator (CEFALY KIT) KAROLINE, 1 Device daily as needed, Disp: 1 Device, Rfl: 0     OLANZapine (ZYPREXA) 5 MG tablet, Take 1 tablet (5 mg) by mouth daily as needed (headache pain, nausea), Disp: 30 tablet, Rfl: 3     omeprazole (PRILOSEC) 20 MG CR capsule, Take 1 capsule (20 mg) by mouth daily, Disp: 90 capsule, Rfl: 2     omeprazole (PRILOSEC) 40 MG capsule, Take 1 capsule (40 mg) by mouth 2 times daily as needed, Disp: 60 capsule, Rfl: 1     ondansetron (ZOFRAN-ODT) 4 MG ODT tab, Take 1-2 tablets (4-8 mg) by mouth every 12 hours as needed for nausea, Disp: 60 tablet, Rfl: 1     oxyCODONE IR (ROXICODONE) 5 MG tablet, Take 0.5 tablets (2.5 mg) by mouth every 6 hours as needed for severe pain, Disp: 15 tablet, Rfl: 0     Probiotic Product (PROBIOTIC DAILY PO), Take 2 packets by mouth every morning , Disp: , Rfl:      Propranolol HCl SR Beads 120 MG CP24, Take 120 mg by mouth daily, Disp: 30 capsule, Rfl: 11     rizatriptan (MAXALT-MLT) 5 MG ODT tab, TAKE 1-2 TABLETS (5-10 MG) BY MOUTH AT ONSET OF HEADACHE FOR MIGRAINE (MAX 30 MG IN 24 HOURS), Disp: , Rfl: 6     SUMAtriptan (IMITREX STATDOSE) 6 MG/0.5ML pen injector kit, Inject 0.5 mLs (6 mg) Subcutaneous at onset of headache for migraine (may repeat once after 2 hrs) May repeat in 1 hour. Max 12 mg/24 hours., Disp: 2 kit, Rfl: 1     tiZANidine (ZANAFLEX) 4 MG tablet, Take 1 tablet (4 mg) by mouth  4 times daily as needed for muscle spasms, Disp: 120 tablet, Rfl: 3     traMADol (ULTRAM) 50 MG tablet, Take 1 tablet (50 mg) by mouth every 6 hours as needed for severe pain (Take scheduled every 6 hours for one week, then resume taking as needed), Disp: 60 tablet, Rfl: 0     VITAMIN D, CHOLECALCIFEROL, PO, Take 2,000 Units by mouth daily, Disp: , Rfl:      ZONISAMIDE PO, Take 75 mg by mouth daily, Disp: , Rfl:      ACETAMINOPHEN PO, Take 1,000 mg by mouth every 6 hours as needed for pain Take with tramadol, Disp: , Rfl:      EPINEPHrine (EPIPEN/ADRENACLICK/OR ANY BX GENERIC EQUIV) 0.3 MG/0.3ML injection 2-pack, Inject 0.3 mg into the muscle, Disp: , Rfl:     SOCIAL HISTORY:  Social History     Social History     Marital status:      Spouse name: Carry     Number of children: 5     Years of education: N/A     Occupational History     Not on file.     Social History Main Topics     Smoking status: Former Smoker     Packs/day: 0.20     Years: 10.00     Types: Cigarettes     Start date: 1/1/1991     Quit date: 12/1/2013     Smokeless tobacco: Never Used      Comment: I somked on and off during specified dates.     Alcohol use Yes      Comment: occasional     Drug use: No     Sexual activity: Yes     Partners: Male     Birth control/ protection: Female Surgical     Other Topics Concern     Parent/Sibling W/ Cabg, Mi Or Angioplasty Before 65f 55m? No     Social History Narrative    5 kids: 24, 23, 19, 13, 7 yo     works at [employer] for disability           FAMILY HISTORY:  Family History   Problem Relation Age of Onset     Connective Tissue Disorder Mother      eds     Connective Tissue Disorder Sister      eds     Cancer Father      Spinal tumor grew into spinal cord, went in brain     Migraines Father      Diabetes Maternal Grandmother      Elderly diabetes     HEART DISEASE Maternal Grandmother      Hypertension Maternal Grandmother      Diabetes Paternal Grandmother      Elderly  "diabetes     Diabetes Paternal Grandfather      Elderly diabetes     Asthma Sister      Pediatric Asthma     Migraines Sister      Asthma Son      Pediatric Asthma     Thyroid Disease Sister      ,     Migraines Sister      Migraines Sister      Breast Cancer No family hx of        Past/family/social history reviewed and no changes    PHYSICAL EXAMINATION:  Constitutional: aaox3, cooperative, pleasant, not dyspneic/diaphoretic, no acute distress  Vitals reviewed: /70 (BP Location: Left arm, Patient Position: Sitting, Cuff Size: Adult Large)  Pulse 72  Temp 98.2  F (36.8  C) (Oral)  Resp 16  Ht 1.71 m (5' 7.32\")  Wt 93.8 kg (206 lb 14.4 oz)  SpO2 97%  BMI 32.09 kg/m2  Wt:   Wt Readings from Last 2 Encounters:   08/08/18 93.8 kg (206 lb 14.4 oz)   07/24/18 93.4 kg (206 lb)      Eyes: Sclera anicteric/injected  Ears/nose/mouth/throat: Normal oropharynx without ulcers or exudate, mucus membranes moist, hearing intact  Neck: supple, thyroid normal size  CV: RRR. No edema  Respiratory: CTA bl. Unlabored breathing  Lymph: No axillary, submandibular, supraclavicular or inguinal lymphadenopathy  Abd: soft, nondistended, +bs, no hepatosplenomegaly, mild TTP with deep palpation of central abdomen. LLQ. No peritoneal signs  Skin: warm, perfused, no jaundice  Psych: Normal affect  MSK: Normal gait    PERTINENT STUDIES:  Labs 10/2017: normal CBC, TSH, ESR, CRP   GES 10/2017: rapid gastric emptying  Video swallow: mod HH (mixed type), deep penetration without aspiration.     Orders Only on 10/27/2017   Component Date Value Ref Range Status     Specimen Description 10/27/2017 Midstream Urine   Final     Special Requests 10/27/2017 Specimen received in preservative   Final     Culture Micro 10/27/2017 *  Final                    Value:10,000 to 50,000 colonies/mL  Escherichia coli       Color Urine 10/27/2017 [place]   Final     Appearance Urine 10/27/2017 Slightly Cloudy   Final     Glucose Urine 10/27/2017 Negative  " NEG^Negative mg/dL Final     Bilirubin Urine 10/27/2017 Negative  NEG^Negative Final     Ketones Urine 10/27/2017 Negative  NEG^Negative mg/dL Final     Specific Gravity Urine 10/27/2017 1.021  1.003 - 1.035 Final     Blood Urine 10/27/2017 Negative  NEG^Negative Final     pH Urine 10/27/2017 5.0  5.0 - 7.0 pH Final     Protein Albumin Urine 10/27/2017 Negative  NEG^Negative mg/dL Final     Urobilinogen mg/dL 10/27/2017 2.0  0.0 - 2.0 mg/dL Final     Nitrite Urine 10/27/2017 Negative  NEG^Negative Final     Leukocyte Esterase Urine 10/27/2017 Negative  NEG^Negative Final     Source 10/27/2017 Midstream Urine   Final     WBC Urine 10/27/2017 32* 0 - 2 /HPF Final     RBC Urine 10/27/2017 3* 0 - 2 /HPF Final     Squamous Epithelial /HPF Urine 10/27/2017 15* 0 - 1 /HPF Final     Transitional Epi 10/27/2017 1* FEW^Few /HPF Final     Mucous Urine 10/27/2017 Present* NEG^Negative /LPF Final       Again, thank you for allowing me to participate in the care of your patient.      Sincerely,    Juana Griffith MD

## 2018-08-08 NOTE — MR AVS SNAPSHOT
After Visit Summary   8/8/2018    Ada Welch    MRN: [id number]           Patient Information     Date Of Birth          1970        Visit Information        Provider Department      8/8/2018 2:15 PM Wade Singh MD [facility] and [facility] for Prostate and Urologic Cancers        Care Instructions    Follow up in 6 months.      It was a pleasure meeting with you today.  Thank you for allowing me and my team the privilege of caring for you today.  YOU are the reason we are here, and I truly hope we provided you with the excellent service you deserve.  Please let us know if there is anything else we can do for you so that we can be sure you are leaving completely satisfied with your care experience.                  Follow-ups after your visit        Your next 10 appointments already scheduled     Aug 13, 2018  1:40 PM CDT   (Arrive by 1:25 PM)   NEW THROAT with Seema Eubanks MD   [facility] Ear Nose and Throat ([facility] [place])    [address] [place] [id number] [phone]           This is a multi-disciplinary care team visit as patients with your type of problem are usually seen by a team of an MD and a Speech-Language Pathologist (who is a specialist in disorders of the voice, throat, and breathing).  Please plan about 2 hours for your visit, which will likely include Laryngeal Function Studies, a Voice/Swallow/Breathing Evaluation, and an Endoscopic Laryngeal Examination to provide a comprehensive evaluation.  Please check with your insurance company to ensure you are covered for these services. - It is important to know that if you are evaluated and/or treated by both a physician and a speech pathologist during your visit, your billing will reflect the input that you receive from both providers as separate professionals. Although most insurance plans do cover these services, we encourage you to contact your insurance  company prior to your visit to determine whether there are any coverage limitations that might affect you financially. - Billing/procedure codes that are frequently associated with visits to our clinic include (but are not limited to) the ones listed below. Most patients will not need all of these items, but it may be useful to ask your insurance company's patient . 91179: Flexible fiberoptic laryngoscopy, 81324: Laryngoscopy; flexible or rigid fiberoptic, with stroboscopy, 26375: Flexible endoscopic evaluation of swallowing, 66265: Laryngeal function aerodynamic evaluation, 53035: Evaluation of Voice and Resonance, 82855: Speech pathology treatment for voice, speech, communication, 32056: Speech pathology treatment for swallowing/oral function for feeding - If you have any concerns or questions, or if you would prefer not to have a speech pathologist involved in your visit, please contact our Clinic Coordinator at [phone], at least 24 hours prior to your appointment.            Aug 13, 2018  1:40 PM CDT   (Arrive by 1:25 PM)   [facility] Speech Pathology and ENT Evaluation with  Ent Dysphonia Slp Provider   [facility] Voice ([facility])    [address] [place] [id number] [phone]            Aug 13, 2018  3:25 PM CDT   (Arrive by 3:10 PM)   Return Visit with Shilpa Galicia MD   [facility] Primary Care Clinic ([facility])    [address] [place] [id number] [phone]            Aug 15, 2018  2:45 PM CDT   (Arrive by 2:30 PM)   Return Visit with Zoe Barahona MD   [facility] Ear Nose and Throat ([facility])    [address] [place] [id number] [phone]            Aug 22, 2018  3:40 PM CDT   (Arrive by 3:25 PM)   Return Visit with Juana Griffith MD   [facility] Gastroenterology and IBD Clinic ([facility])     [address] [place] [address]  [address] [facility] [address]   [phone]            Sep 13, 2018  1:50 PM CDT   (Arrive by 1:35 PM)   RETURN ARRHYTHMIA with Kirk Russell MD   [facility] Heart Care ([facility])    [address] [place] [address]  [address] [place] [address]   [phone]            Oct 22, 2018  7:35 AM CDT   (Arrive by 7:20 AM)   New Allergy with Vahe Rodriguez MD   [facility] ([facility])    [address] [place] [address]  [address] [place] [id number] [phone]           Do not take anti-histamines or Zantac for seven day prior to your appointment.            Oct 25, 2018  9:25 AM CDT   (Arrive by 9:10 AM)   Return Visit with Ellyn Rivera MD   [facility] ([facility])    [address] [place] [id number] [phone]              Who to contact     Please call your clinic at [phone] to:    Ask questions about your health    Make or cancel appointments    Discuss your medicines    Learn about your test results    Speak to your doctor            Additional Information About Your Visit        Inkvite Information     Inkvite gives you secure access to your electronic health record. If you see a primary care provider, you can also send messages to your care team and make appointments. If you have questions, please call your primary care clinic.  If you do not have a primary care provider, please call [phone] and they will assist you.      Inkvite is an electronic gateway that provides easy, online access to your medical records. With Inkvite, you can request a clinic appointment, read your test results, renew a prescription or communicate with your care team.     To access your existing account, please contact your [facility] or call [phone] for assistance.        Care EveryWhere  "ID     This is your Care EveryWhere ID. This could be used by other organizations to access your [facility] medical records  [id number]        Your Vitals Were     Pulse Height BMI (Body Mass Index)             72 1.71 m (5' 7.32\") 31.98 kg/m2          Blood Pressure from Last 3 Encounters:   08/08/18 105/70   08/08/18 105/70   08/03/18 102/73    Weight from Last 3 Encounters:   08/08/18 93.5 kg (206 lb 2.1 oz)   08/08/18 93.8 kg (206 lb 14.4 oz)   07/24/18 93.4 kg (206 lb)              Today, you had the following     No orders found for display         Today's Medication Changes          These changes are accurate as of 8/8/18  2:50 PM.  If you have any questions, ask your nurse or doctor.               These medicines have changed or have updated prescriptions.        Dose/Directions    * omeprazole 20 MG CR capsule   Commonly known as:  priLOSEC   This may have changed:  Another medication with the same name was added. Make sure you understand how and when to take each.   Used for:  Hiatal hernia   Changed by:  Juana Griffith MD        Dose:  20 mg   Take 1 capsule (20 mg) by mouth daily   Quantity:  90 capsule   Refills:  2       * omeprazole 40 MG capsule   Commonly known as:  priLOSEC   This may have changed:  You were already taking a medication with the same name, and this prescription was added. Make sure you understand how and when to take each.   Used for:  Eosinophilic esophagitis   Changed by:  Juana Griffith MD        Dose:  40 mg   Take 1 capsule (40 mg) by mouth 2 times daily as needed   Quantity:  60 capsule   Refills:  3       * Notice:  This list has 2 medication(s) that are the same as other medications prescribed for you. Read the directions carefully, and ask your doctor or other care provider to review them with you.         Where to get your medicines      These medications were sent to [facility] [address] [place] [facility]  [address]  [address] [place] [address]     Phone:  " [phone]     omeprazole 40 MG capsule                Primary Care Provider Office Phone # Fax #    Ellyn Rivera [phone]       [address] [address]        Equal Access to Services     KEYONALETA BROOKS : Hadii kia gamboa zach Sonegrito, waaxda luqadaha, qaybta kaalmada adeegyada, odette jerryin hayaajuanjose fergusonalan herreragrant tang. So [facility] [phone].    ATENCIÓN: Si habla español, tiene a sweeney disposición servicios gratuitos de asistencia lingüística. Llame al [phone].    We comply with applicable federal civil rights laws and [place] laws. We do not discriminate on the basis of race, color, national origin, age, disability, sex, sexual orientation, or gender identity.            Thank you!     Thank you for choosing [facility] UROLOGY AND [facility] [[email or web address]] FOR PROSTATE AND UROLOGIC CANCERS  for your care. Our goal is always to provide you with excellent care. Hearing back from our patients is one way we can continue to improve our services. Please take a few minutes to complete the written survey that you may receive in the mail after your visit with us. Thank you!             Your Updated Medication List - Protect others around you: Learn how to safely use, store and throw away your medicines at www.disposemymeds.org.          This list is accurate as of 8/8/18  2:50 PM.  Always use your most recent med list.                   Brand Name Dispense Instructions for use Diagnosis    ACETAMINOPHEN PO      Take 1,000 mg by mouth every 6 hours as needed for pain Take with tramadol        ADVAIR DISKUS 250-50 MCG/DOSE diskus inhaler   Generic drug:  fluticasone-salmeterol      Inhale 1 puff into the lungs 2 times daily as needed        albuterol 108 (90 Base) MCG/ACT Inhaler    PROAIR HFA/PROVENTIL HFA/VENTOLIN HFA     Inhale 2 puffs into the lungs every 6 hours as needed        AMITRIPTYLINE HCL PO      Take 40 mg by mouth daily for two weeks, then increase to 50 mg daily.         buprenorphine 10 MCG/HR WK patch    BUTRANS    4 patch    Place 1 patch onto the skin every 7 days    Postlaminectomy syndrome of lumbar region       butalbital-acetaminophen-caffeine -40 MG per tablet    FIORICET/ESGIC     TAKE ONE TABLET BY MOUTH EVERY 4 HOURS AS NEEDED        CEFALY KIT Pauly     1 Device    1 Device daily as needed    Intractable chronic migraine without aura and without status migrainosus       cetirizine 10 MG tablet    zyrTEC     Take 10 mg by mouth daily        COMPRESSION STOCKINGS     3 each    1 each daily 20-30 mmHg Thigh Length measure and fit, color and style per patient preference. Doff n jose per need.    Orthostatic hypotension       DULOXETINE HCL PO      Take 60 mg by mouth in the morning and 30 mg in the evening.        eletriptan 20 MG tablet    RELPAX    18 tablet    Take 1 tablet (20 mg) by mouth at onset of headache for migraine May repeat in 2 hours. Max 4 tablets/24 hours.    Chronic daily headache, Intractable migraine without aura and with status migrainosus       EPINEPHrine 0.3 MG/0.3ML injection 2-pack    EPIPEN/ADRENACLICK/or ANY BX GENERIC EQUIV     Inject 0.3 mg into the muscle        famotidine 20 MG tablet    PEPCID    90 tablet    Take 1 tablet (20 mg) by mouth At Bedtime    Hiatal hernia, Mast cell disorder       fludrocortisone 0.1 MG tablet    FLORINEF    180 tablet    Take 2 tablets (0.2 mg) by mouth daily    Autonomic dysfunction, Pre-syncope       fluticasone 50 MCG/ACT spray    FLONASE    48 mL    INSTILL 2 SPRAYS INTO BOTH NOSTRILS DAILY    Chronic rhinitis, unspecified type       linaclotide 145 MCG capsule    LINZESS    30 capsule    Take 1 capsule (145 mcg) by mouth every morning (before breakfast) Last refill until GI clinic follow up.    Chronic idiopathic constipation       medical cannabis (Patient's own supply.  Not a prescription)      Take 1 Dose by mouth See Admin Instructions (This is NOT a prescription, and does not certify that the  patient has a qualifying medical condition for medical cannabis.  The purpose of this order is  to document that the patient reports taking medical cannabis.)        metoclopramide 5 MG tablet    REGLAN    180 tablet    Take 1-2 tablets (5-10 mg) by mouth every 6 hours as needed    Intractable chronic migraine without aura and without status migrainosus       MULTIVITAMIN PO      Take 1 tablet by mouth daily        OLANZapine 5 MG tablet    zyPREXA    30 tablet    Take 1 tablet (5 mg) by mouth daily as needed (headache pain, nausea)    Other migraine with status migrainosus, intractable, Intractable chronic migraine without aura and without status migrainosus       * omeprazole 20 MG CR capsule    priLOSEC    90 capsule    Take 1 capsule (20 mg) by mouth daily    Hiatal hernia       * omeprazole 40 MG capsule    priLOSEC    60 capsule    Take 1 capsule (40 mg) by mouth 2 times daily as needed    Eosinophilic esophagitis       ondansetron 4 MG ODT tab    ZOFRAN-ODT    60 tablet    Take 1-2 tablets (4-8 mg) by mouth every 12 hours as needed for nausea    Migraine without status migrainosus, not intractable, unspecified migraine type       oxyCODONE IR 5 MG tablet    ROXICODONE    15 tablet    Take 0.5 tablets (2.5 mg) by mouth every 6 hours as needed for severe pain    Postlaminectomy syndrome       PROBIOTIC DAILY PO      Take 2 packets by mouth every morning        Propranolol HCl SR Beads 120 MG Cp24     30 capsule    Take 120 mg by mouth daily    Sinus tachycardia       rizatriptan 5 MG ODT tab    MAXALT-MLT     TAKE 1-2 TABLETS (5-10 MG) BY MOUTH AT ONSET OF HEADACHE FOR MIGRAINE (MAX 30 MG IN 24 HOURS)        SUMAtriptan 6 MG/0.5ML pen injector kit    IMITREX STATDOSE    2 kit    Inject 0.5 mLs (6 mg) Subcutaneous at onset of headache for migraine (may repeat once after 2 hrs) May repeat in 1 hour. Max 12 mg/24 hours.    Chronic migraine without aura without status migrainosus, not intractable       SYNTHROID 25  MCG tablet   Generic drug:  levothyroxine      Take 25 mcg by mouth daily        tiZANidine 4 MG tablet    ZANAFLEX    120 tablet    Take 1 tablet (4 mg) by mouth 4 times daily as needed for muscle spasms        traMADol 50 MG tablet    ULTRAM    60 tablet    Take 1 tablet (50 mg) by mouth every 6 hours as needed for severe pain (Take scheduled every 6 hours for one week, then resume taking as needed)    Postlaminectomy syndrome       VITAMIN D (CHOLECALCIFEROL) PO      Take 2,000 Units by mouth daily        ZONISAMIDE PO      Take 75 mg by mouth daily        * Notice:  This list has 2 medication(s) that are the same as other medications prescribed for you. Read the directions carefully, and ask your doctor or other care provider to review them with you.

## 2018-08-08 NOTE — PROGRESS NOTES
"Reason for Visit: Urinary Urgency    Clinical Data:  Ms. Welch is a 47 year old female with gastroparesis, Ehler's Danlos Syndrome, Mast cell disease,  Chiari malformation, dysautonomia who also experiences urinary urgency with associated incontinence.This problem has been going on since she underwent a spinal fusion in 2013 and has been getting worse. She was last seen 6 months ago in clinic. At her last visit, she was referred to pelvic floor physical therapy and recommended to optimize her bowel movements.  Patient is scheduled to undergo biofeedback in October. She has since been started on Linzess by GI and has increased her fiber intake. She reports significant improvement in symptoms since optimizing her bowel regimen and changing her diet. She is now having daily bowel movements.     PEx:   Blood pressure 105/70, pulse 72, height 1.71 m (5' 7.32\"), weight 93.5 kg (206 lb 2.1 oz), not currently breastfeeding.  5' 7.32\", Body mass index is 31.98 kg/(m^2)., 206 lbs 2.08 oz  Gen appearance:  Well groomed  HEENT:  EOMI, AT NC      Admission on 07/24/2017, Discharged on 07/27/2017   Component Date Value Ref Range Status     Sodium 07/24/2017 141  133 - 144 mmol/L Final     Potassium 07/24/2017 4.0  3.4 - 5.3 mmol/L Final     Chloride 07/24/2017 110* 94 - 109 mmol/L Final     Carbon Dioxide 07/24/2017 27  20 - 32 mmol/L Final     Anion Gap 07/24/2017 5  3 - 14 mmol/L Final     Glucose 07/24/2017 88  70 - 99 mg/dL Final     Urea Nitrogen 07/24/2017 8  7 - 30 mg/dL Final     Creatinine 07/24/2017 0.85  0.52 - 1.04 mg/dL Final     GFR Estimate 07/24/2017 72  >60 mL/min/1.7m2 Final    Non  GFR Calc     GFR Estimate If Black 07/24/2017 87  >60 mL/min/1.7m2 Final    African American GFR Calc     Calcium 07/24/2017 8.9  8.5 - 10.1 mg/dL Final     Bilirubin Total 07/24/2017 0.6  0.2 - 1.3 mg/dL Final     Albumin 07/24/2017 4.1  3.4 - 5.0 g/dL Final     Protein Total 07/24/2017 7.4  6.8 - 8.8 g/dL Final     " Alkaline Phosphatase 07/24/2017 148  40 - 150 U/L Final     ALT 07/24/2017 56* 0 - 50 U/L Final     AST 07/24/2017 36  0 - 45 U/L Final     WBC 07/24/2017 5.7  4.0 - 11.0 10e9/L Final     RBC Count 07/24/2017 4.41  3.8 - 5.2 10e12/L Final     Hemoglobin 07/24/2017 13.5  11.7 - 15.7 g/dL Final     Hematocrit 07/24/2017 39.5  35.0 - 47.0 % Final     MCV 07/24/2017 90  78 - 100 fl Final     MCH 07/24/2017 30.6  26.5 - 33.0 pg Final     MCHC 07/24/2017 34.2  31.5 - 36.5 g/dL Final     RDW 07/24/2017 12.6  10.0 - 15.0 % Final     Platelet Count 07/24/2017 238  150 - 450 10e9/L Final     Diff Method 07/24/2017 Automated Method   Final     % Neutrophils 07/24/2017 61.9  % Final     % Lymphocytes 07/24/2017 29.0  % Final     % Monocytes 07/24/2017 5.1  % Final     % Eosinophils 07/24/2017 4.0  % Final     % Basophils 07/24/2017 0.0  % Final     % Immature Granulocytes 07/24/2017 0.0  % Final     Nucleated RBCs 07/24/2017 0  0 /100 Final     Absolute Neutrophil 07/24/2017 3.5  1.6 - 8.3 10e9/L Final     Absolute Lymphocytes 07/24/2017 1.7  0.8 - 5.3 10e9/L Final     Absolute Monocytes 07/24/2017 0.3  0.0 - 1.3 10e9/L Final     Absolute Eosinophils 07/24/2017 0.2  0.0 - 0.7 10e9/L Final     Absolute Basophils 07/24/2017 0.0  0.0 - 0.2 10e9/L Final     Abs Immature Granulocytes 07/24/2017 0.0  0 - 0.4 10e9/L Final     Absolute Nucleated RBC 07/24/2017 0.0   Final     CRP Inflammation 07/24/2017 <2.9  0.0 - 8.0 mg/L Final     Sed Rate 07/24/2017 10  0 - 20 mm/h Final     INR 07/25/2017 1.01  0.86 - 1.14 Final     WBC CSF 07/26/2017 0  0 - 5 /uL Final     RBC CSF 07/26/2017 0  0 - 2 /uL Final     Tube Number 07/26/2017 4  # Final     Color CSF 07/26/2017 Colorless  CLRL Final     Appearance CSF 07/26/2017 Clear  CLER Final     Glucose CSF 07/26/2017 63  40 - 70 mg/dL Final    CSF glucose concentrations are about 60 percent of normal plasma glucose.     Protein Total CSF 07/26/2017 36  15 - 60 mg/dL Final     Glucose 07/26/2017  98  70 - 99 mg/dL Final       ASSESSMENT and PLAN:  This is a 47 year old female with a complex medical history presentingn for follow up of night time urgency with incontinence. Patient symptoms have improved with lifestyle and dietary modification and optimizing her bowel regimen. She is to start pelvic floor physical therapy in October.     - Continue lifestyle modifications - avoid bladder irritants, increase fiber intake   - Continue current bowel regimen   - Continue pelvic physical therapy   - Follow up in 6 months or sooner if symptoms worsen     Fabiana Villalba MD   Urology Resident PGY-4    Patient was seen, evaluated and plan was formulated in conjunction with me and I agree with the above.  Wade Singh MD

## 2018-08-09 ENCOUNTER — CARE COORDINATION (OUTPATIENT)
Dept: NEUROLOGY | Facility: CLINIC | Age: 48
End: 2018-08-09

## 2018-08-09 NOTE — PROGRESS NOTES
Received aimovig today at 1708 on 08/09/2018, put it in the refrigerator. Sending a message to Britt to call patient to come in and do patient teaching for first injection.

## 2018-08-13 ENCOUNTER — MYC MEDICAL ADVICE (OUTPATIENT)
Dept: NEUROLOGY | Facility: CLINIC | Age: 48
End: 2018-08-13

## 2018-08-13 ENCOUNTER — OFFICE VISIT (OUTPATIENT)
Dept: OTOLARYNGOLOGY | Facility: CLINIC | Age: 48
End: 2018-08-13
Payer: MEDICARE

## 2018-08-13 ENCOUNTER — OFFICE VISIT (OUTPATIENT)
Dept: INTERNAL MEDICINE | Facility: CLINIC | Age: 48
End: 2018-08-13
Payer: MEDICARE

## 2018-08-13 ENCOUNTER — PRE VISIT (OUTPATIENT)
Dept: OTOLARYNGOLOGY | Facility: CLINIC | Age: 48
End: 2018-08-13

## 2018-08-13 VITALS
OXYGEN SATURATION: 97 % | SYSTOLIC BLOOD PRESSURE: 120 MMHG | WEIGHT: 202 LBS | DIASTOLIC BLOOD PRESSURE: 85 MMHG | BODY MASS INDEX: 31.64 KG/M2 | HEART RATE: 69 BPM | RESPIRATION RATE: 20 BRPM

## 2018-08-13 VITALS — HEIGHT: 67 IN | WEIGHT: 202 LBS | BODY MASS INDEX: 31.71 KG/M2

## 2018-08-13 DIAGNOSIS — J38.7 IRRITABLE LARYNX SYNDROME: ICD-10-CM

## 2018-08-13 DIAGNOSIS — E03.9 HYPOTHYROIDISM, UNSPECIFIED TYPE: ICD-10-CM

## 2018-08-13 DIAGNOSIS — R49.0 DYSPHONIA: Primary | ICD-10-CM

## 2018-08-13 DIAGNOSIS — Q79.60 EHLERS-DANLOS SYNDROME: ICD-10-CM

## 2018-08-13 DIAGNOSIS — G43.011 INTRACTABLE MIGRAINE WITHOUT AURA AND WITH STATUS MIGRAINOSUS: Primary | ICD-10-CM

## 2018-08-13 DIAGNOSIS — G43.711 INTRACTABLE CHRONIC MIGRAINE WITHOUT AURA AND WITH STATUS MIGRAINOSUS: Primary | ICD-10-CM

## 2018-08-13 DIAGNOSIS — R05.3 CHRONIC COUGH: ICD-10-CM

## 2018-08-13 DIAGNOSIS — R49.0 DYSPHONIA: ICD-10-CM

## 2018-08-13 DIAGNOSIS — R13.19 ESOPHAGEAL DYSPHAGIA: Primary | ICD-10-CM

## 2018-08-13 RX ORDER — LEVOTHYROXINE SODIUM 25 UG/1
25 TABLET ORAL DAILY
Qty: 90 TABLET | Refills: 3 | Status: SHIPPED | OUTPATIENT
Start: 2018-08-13 | End: 2019-09-07

## 2018-08-13 ASSESSMENT — PAIN SCALES - GENERAL
PAINLEVEL: SEVERE PAIN (7)
PAINLEVEL: SEVERE PAIN (7)

## 2018-08-13 NOTE — PATIENT INSTRUCTIONS
1.  You were seen in the ENT Clinic today by Dr. Eubanks.  If you have any questions or concerns after your appointment, please call [phone].  Press option #1 for scheduling related needs.  Press option #3 for Nurse advice.  2.  Plan is to return to clinic as needed    LINDA Covington  [facility] ENT

## 2018-08-13 NOTE — PROGRESS NOTES
[facility]   Shilpa Galicia MD  08/13/2018      Chief Complaint:   Recheck Medication and Results       History of Present Illness:   Ada Welch is an immunosuppressed 47 year old female with a history of intractable chronic migraine, acquired hypothyroidism, eosinophilic esophagitis, and allergic rhinitis, who presents alone for follow-up after multiple consultations, evaluation of medication as well as refills.    Migraine: The patient has a longstanding history of migraine headaches and has been working closely with Dr. Noemy Nguyen of neurology. Recently, she has noticed worsening tension type headaches. The patient was evaluated in the emergency department on 07/23 for a migraine. At that time, she received more relief using DHE than she had in the past. She has been taking Maxalt for further control. This morning, the patient started her first Aimovig dose. At the time of evaluation today, she was experiencing a headache and was photophobic    She has also been evaluated by Dr. Isbell of [facility] neurology who specializes in dysautonomia. Her headaches are typically located at the base of her skull and the top of her spine. This pain is exacerbated with staying in an upright position for extended periods of time.    Eosinophilic esophagitis: The patient underwent an upper endoscopy on 08/03 with the below findings:     - Z-line regular, 32 cm from the incisors.    - Esophageal mucosal changes suggestive of eosinophilic esophagitis. Biopsied proximal, mid and distal.    - 3 cm hiatal hernia without erosions or ulceration.    - Normal cardia, gastric fundus, gastric body, incisura, antrum and pylorus.    - Normal duodenal bulb and second portion of the duodenum.    - History and endoscopic findings are suggestive of PPI non-responsive EoE.     Her Prilosec dosage was recently increased to treat the esophagitis. After the patient completes this trial, she will undergo repeat upper  endoscopy with possible throat stretching. Of note, the patient does have an autoimmune disease as well as seasonal allergies with nasal polyps and recent diagnosis of eosinophilic esophagitis. She has a possible history of mast cell disorder with notoriously high levels of histamines levels.     Breathing concerns: The patient has experienced continued exertional shortness of breath as well as shortness of breath onset with dust exposure and lying supine. On her most recent chest CT which was completed on 04/26, the patient was found to have lung nodules.  She has a follow-up appointment with pulmonary this week.    Enlarged lymph nodes: She has been dealing with enlarged lymph nodes with occasional pain and associated ear throbbing. Her cervical lymph nodes were biopsied about 2 years ago.  She tells me that this showed a reactive tissue and nonmalignant.  There was no evidence for lymphoma.  She appreciated increased swelling again in May 2018.     Health Care Maintenance/Other:  1. History of seasonal allergies   2. TSH- therapeutic in April 2018     Review of Systems:   Pertinent items are noted in HPI, remainder of complete ROS is negative.      Active Medications:      ACETAMINOPHEN PO, Take 1,000 mg by mouth every 6 hours as needed for pain Take with tramadol, Disp: , Rfl:      albuterol (PROAIR HFA/PROVENTIL HFA/VENTOLIN HFA) 108 (90 BASE) MCG/ACT Inhaler, Inhale 2 puffs into the lungs every 6 hours as needed , Disp: , Rfl:      AMITRIPTYLINE HCL PO, Take 40 mg by mouth daily for two weeks, then increase to 50 mg daily., Disp: , Rfl:      buprenorphine (BUTRANS) 10 MCG/HR WK patch, Place 1 patch onto the skin every 7 days, Disp: 4 patch, Rfl: 2     butalbital-acetaminophen-caffeine (FIORICET/ESGIC) -40 MG per tablet, TAKE ONE TABLET BY MOUTH EVERY 4 HOURS AS NEEDED, Disp: , Rfl: 0     cetirizine (ZYRTEC) 10 MG tablet, Take 10 mg by mouth daily , Disp: , Rfl:      DULOXETINE HCL PO, Take 60 mg by mouth  in the morning and 30 mg in the evening., Disp: , Rfl:      eletriptan (RELPAX) 20 MG tablet, Take 1 tablet (20 mg) by mouth at onset of headache for migraine May repeat in 2 hours. Max 4 tablets/24 hours., Disp: 18 tablet, Rfl: 1     EPINEPHrine (EPIPEN/ADRENACLICK/OR ANY BX GENERIC EQUIV) 0.3 MG/0.3ML injection 2-pack, Inject 0.3 mg into the muscle, Disp: , Rfl:      famotidine (PEPCID) 20 MG tablet, Take 1 tablet (20 mg) by mouth At Bedtime, Disp: 90 tablet, Rfl: 1     fludrocortisone (FLORINEF) 0.1 MG tablet, Take 2 tablets (0.2 mg) by mouth daily, Disp: 180 tablet, Rfl: 3     fluticasone (FLONASE) 50 MCG/ACT spray, INSTILL 2 SPRAYS INTO BOTH NOSTRILS DAILY, Disp: 48 mL, Rfl: 3     fluticasone-salmeterol (ADVAIR DISKUS) 250-50 MCG/DOSE diskus inhaler, Inhale 1 puff into the lungs 2 times daily as needed , Disp: , Rfl:      levothyroxine (SYNTHROID) 25 MCG tablet, Take 25 mcg by mouth daily, Disp: , Rfl:      linaclotide (LINZESS) 145 MCG capsule, Take 1 capsule (145 mcg) by mouth every morning (before breakfast) Last refill until GI clinic follow up., Disp: 30 capsule, Rfl: 0     medical cannabis (Patient's own supply.  Not a prescription), Take 1 Dose by mouth See Admin Instructions (This is NOT a prescription, and does not certify that the patient has a qualifying medical condition for medical cannabis.  The purpose of this order is  to document that the patient reports taking medical cannabis.), Disp: , Rfl:      metoclopramide (REGLAN) 5 MG tablet, Take 1-2 tablets (5-10 mg) by mouth every 6 hours as needed, Disp: 180 tablet, Rfl: 1     Multiple Vitamins-Minerals (MULTIVITAMIN PO), Take 1 tablet by mouth daily , Disp: , Rfl:      OLANZapine (ZYPREXA) 5 MG tablet, Take 1 tablet (5 mg) by mouth daily as needed (headache pain, nausea), Disp: 30 tablet, Rfl: 3     omeprazole (PRILOSEC) 20 MG CR capsule, Take 1 capsule (20 mg) by mouth daily, Disp: 90 capsule, Rfl: 2     omeprazole (PRILOSEC) 40 MG capsule, Take  1 capsule (40 mg) by mouth 2 times daily as needed, Disp: 60 capsule, Rfl: 3     ondansetron (ZOFRAN-ODT) 4 MG ODT tab, Take 1-2 tablets (4-8 mg) by mouth every 12 hours as needed for nausea, Disp: 60 tablet, Rfl: 1     oxyCODONE IR (ROXICODONE) 5 MG tablet, Take 0.5 tablets (2.5 mg) by mouth every 6 hours as needed for severe pain, Disp: 15 tablet, Rfl: 0     Probiotic Product (PROBIOTIC DAILY PO), Take 2 packets by mouth every morning , Disp: , Rfl:      Propranolol HCl SR Beads 120 MG CP24, Take 120 mg by mouth daily, Disp: 30 capsule, Rfl: 11     rizatriptan (MAXALT-MLT) 5 MG ODT tab, TAKE 1-2 TABLETS (5-10 MG) BY MOUTH AT ONSET OF HEADACHE FOR MIGRAINE (MAX 30 MG IN 24 HOURS), Disp: , Rfl: 6     SUMAtriptan (IMITREX STATDOSE) 6 MG/0.5ML pen injector kit, Inject 0.5 mLs (6 mg) Subcutaneous at onset of headache for migraine (may repeat once after 2 hrs) May repeat in 1 hour. Max 12 mg/24 hours., Disp: 2 kit, Rfl: 1     tiZANidine (ZANAFLEX) 4 MG tablet, Take 1 tablet (4 mg) by mouth 4 times daily as needed for muscle spasms, Disp: 120 tablet, Rfl: 3     traMADol (ULTRAM) 50 MG tablet, Take 1 tablet (50 mg) by mouth every 6 hours as needed for severe pain (Take scheduled every 6 hours for one week, then resume taking as needed), Disp: 60 tablet, Rfl: 0     VITAMIN D, CHOLECALCIFEROL, PO, Take 2,000 Units by mouth daily, Disp: , Rfl:      ZONISAMIDE PO, Take 75 mg by mouth daily, Disp: , Rfl:       Allergies:   Bee venom  No clinical screening - see comments  Chicken-derived products (egg)  Compazine [prochlorperazine]  Gabapentin  Gluten meal  Topiramate  Wheat  Wheat bran      Past Medical History:  Allergic rhinitis    Autoimmune disease  Bleeding disorder  Blood clotting disorder    Chronic tonsillitis   Tom-Danlos disease   Gastroesophageal reflux disease (GERD)    Hiatal hernia   Hoarseness   Immunosuppression    Nasal polyps   Other nervous system complications , neuralgia and  neuropathy  Pneumonia     Chronic pain syndrome   Postlaminectomy syndrome, lumbar region  Cervicalgia  Slow transit constipation  Urinary urgency  Nocturia  Cardiac device in situ- [employer] Implanted Loop Recorder (ILR)  Acquired hypothyroidism  Anxiety disorder  Astigmatism  Chronic sinusitis with recurrent bronchitis  Arthritis involving multiple sites  Cyst of fallopian tube  Depression  Deviated nasal septum  Essential hypertension with goal blood pressure less than 140/90  Head and neck lymphadenopathy  Keratoconjunctivitis sicca  Liver lesion  Low back pain of multiple sites of spine with sciatica  Degenerative disc disease (DDD), lumbar   Lumbar radiculopathy  Intractable chronic migraine without aura and without status migrainosus  Nasal turbinate hypertrophy  Non-compliance with medical treatment  Obese  Opioid type dependence, continuous  Other acute postoperative pain  Pain disorder associated with psychological and physical factors  Presbyopia  Spondylolisthesis of lumbar region  Sinus tachycardia   Uterine endometriosis  Autonomic dysfunction     Past Surgical History:  Adenoidectomy   Nasal septum repair   Spinal fusion   Mole biopsy   Lymph node biopsy   Colonoscopy   Nasal/sinus polypectomy   Tonsillectomy   Tubal ligation     Family History:   Tom-Danlos syndrome   Spinal tumor   Migraines  Diabetes    Heart disease   Hypertension   Asthma   Thyroid disease       Social History:   The patient is  with 5 children, a former smoker (quit date: 12/2013), and does consume alcohol.      Vitals:   /85 (BP Location: Right arm, Patient Position: Sitting, Cuff Size: Adult Regular)  Pulse 69  Resp 20  Wt 91.6 kg (202 lb)  SpO2 97%  BMI 31.64 kg/m2      Assessment and Plan:  1. Intractable chronic migraine without aura and with status migrainosus  Patient has been closely following with Dr. Martínez of neurology as well as Dr. Isbell of [facility] neurology. Patient received Aimovig dosage  this morning. She continues to use Maxalt for control.     2. Eosinophilic esophagitis  Recent diagnosis after completed upper endoscopy. Prilosec dosage increased for better control. After trial, patient is to repeat upper endoscopy. Patient should follow up with Dr. Griffith of gastro at her current scheduled appointment on 08/22.     3. Breathing concerns  Persistent shortness of breath with exertion, exposure to dust, and laying flat. Patient has follow up appointment scheduled with Dr. Rodriguez of pulmonology scheduled on 10/22.     4. Enlarged lymph nodes  Reactive in the past.  Has a follow-up appointment on 815 with ENT.    5. Hypothyroidism, unspecified type  TSH was therapeutic in April 2018. Prescription refill provided at this time.   - levothyroxine (SYNTHROID) 25 MCG tablet  Dispense: 90 tablet; Refill: 3        Follow-up: Return in about 3 months (around 11/13/2018).       I spent a total of 25 minutes face-to-face with Ada Welch during today's office visit.  Over 50% of this time was spent counseling the patient and/or coordinating care regarding multiple medical issues.  See note for details.  Scribe Disclosure:  We, Мария Goldstein and Elizabeth Dennis, are serving as the scribes to document services personally performed by Shilpa Galicia MD at this visit, based upon the provider's statements to me. All documentation has been reviewed by the aforementioned provider prior to being entered into the official medical record.     Portions of this medical record were completed by a scribe. UPON MY REVIEW AND AUTHENTICATION BY ELECTRONIC SIGNATURE, this confirms (a) I performed the applicable clinical services, and (b) the record is accurate.     Shilpa Galicia

## 2018-08-13 NOTE — NURSING NOTE
Ada Welch comes into clinic today at the request of Dr. Nguyen for the education and administration of Aimovig injection.   Possible Aimovig side effects such as injection site irritation and constipation were discussed as well as the demonstration of the injection with our demo injection pen.  Ada chose to deliver the first dose herself via her abdomen.  Ada states that she is very familiar with giving herself injections and proceeded without any difficulty.  No questions or concerns were voiced and she verbalized excitement and is hopeful that this medication will improve her headaches.  I asked that she contact me in four weeks to let me know how she is doing.      This service provided today was under the supervising provider of the day Dr. Noemy Nguyen, who was available if needed.    Darlene Martínez

## 2018-08-13 NOTE — MR AVS SNAPSHOT
After Visit Summary   8/13/2018    Ada Welch    MRN: [id number]           Patient Information     Date Of Birth          1970        Visit Information        Provider Department      8/13/2018 1:40 PM Seema Eubanks MD [facility] Ear Nose [facility] Th[facility]at        Today's Diagnoses     Esophageal dysphagia    -  1    Dysphonia        Chronic cough        Irritable larynx syndrome        Tom-Danlos syndrome          Care Instructions    1.  You were seen in the ENT Clinic today by Dr. Eubanks.  If you have any questions or concerns after your appointment, please call [phone].  Press option #1 for scheduling related needs.  Press option #3 for Nurse advice.  2.  Plan is to return to clinic as needed    LINDA Covington  [facility]                  Follow-ups after your visit        Additional Services     [facility] REFERRAL       SPEECH-LANGUAGE PATHOLOGY SERVICE(S) REQUESTED:  Evaluate and treat  Laryngoscopy; add Stroboscopy as needed for assessment of vibratory characteristics of vocal fold mucosa in evaluation of dysphonia; add Flexible Endoscopic Evaluation of Swallowing as needed for evaluation of swallow when dysphagia is suspected; if patient is unable to tolerate laryngoscopy, may use 3% lidocaine/0.25% phenylephrine or 20% oral anesthetic benzocaine as needed, but not for evaluation of swallow.    Meghan Brito, Ph.D., CCC/SLP  Speech-Language Pathologist  Director, [facility]  [phone]    Sierra López M.M., M.A., CCC/SLP  Speech-Language Pathologist  [facility]  [phone]    Allison Alpers, M.A., CCC/SLP  Speech-Language Pathologist  [facility]    David Doss M.M., M.A., CCC/SLP  Speech-Language Pathologist  [facility]                  Your next 10 appointments already scheduled     Sep 11, 2018 11:00 AM CDT   US BIOPSY FINE NEEDLE ASPIRATION LYMPH NODE with UCUS3,  IMAGING NURSE, UC PROCEDURAL  RAD   [facility] ([facility])    [address]  1st Floor  [facility] [id number] [phone]           Bring a list of your medicines to the exam. Include vitamins, minerals and over-the-counter drugs.  Tell your doctor in advance:   If you are or may be pregnant.   If you are taking Coumadin (or any other blood thinners) 5 days prior to the exam for any special instructions.   If you are diabetic to determine if your insulin needs have to be adjusted for the exam.  IF YOUR DOCTOR ALSO PRESCRIBED SEDATION DURING THE EXAM (medicine to help you relax): You will receive separate instructions about driving, eating, and additional tests that may be necessary prior to your exam day.  Please call the Imaging Department at your exam site with any questions.            Sep 20, 2018  3:00 PM CDT   (Arrive by 2:45 PM)   RETURN ARRHYTHMIA with Kirk Russell MD   [facility] ([facility])    [address] [place] [address]  [address]  [facility] [id number] [phone]            Sep 21, 2018  2:00 PM CDT   (Arrive by 1:45 PM)   CONSULT with Adalberto Shipley MD   [facility] ([facility])    [address]  [address]  [facility] [id number] [phone]            Oct 15, 2018  6:45 PM CDT   (Arrive by 6:30 PM)   Return Visit with Zoe Barahona MD   [facility] ([facility])    [address]  4th Floor  [facility] [id number] [phone]            Oct 22, 2018  7:35 AM CDT   (Arrive by 7:20 AM)   New Allergy with Vahe Rodriguez MD   [facility] ([facility])    [address] [place] [address]  [address]  [facility] [id number] [phone]           Do not take anti-histamines or Zantac for seven day prior to your appointment.            Oct 22, 2018   Procedure with Sadia Carolina,  "MD   [facility] [place], Endoscopy ([facility])    [address] [place] [id number] [phone]           The [facility] is located on the corner of [address] and [facility] [address] [address] on the [facility]. It is easily accessible from virtually any point in the [facility] area, via [address] and [address].              Who to contact     Please call your clinic at [phone] to:    Ask questions about your health    Make or cancel appointments    Discuss your medicines    Learn about your test results    Speak to your doctor            Additional Information About Your Visit        [employer] Information     [facility] gives you secure access to your electronic health record. If you see a primary care provider, you can also send messages to your care team and make appointments. If you have questions, please call your primary care clinic.  If you do not have a primary care provider, please call [phone] and they will assist you.      [facility] is an electronic gateway that provides easy, online access to your medical records. With [facility], you can request a clinic appointment, read your test results, renew a prescription or communicate with your care team.     To access your existing account, please contact your [facility] or call [phone] for assistance.        Care EveryWhere ID     This is your Care EveryWhere ID. This could be used by other organizations to access your [facility] medical records  [id number]        Your Vitals Were     Height BMI (Body Mass Index)                1.702 m (5' 7\") 31.64 kg/m2           Blood Pressure from Last 3 Encounters:   08/13/18 120/85   08/08/18 105/70   08/08/18 105/70    Weight from Last 3 Encounters:   08/13/18 91.6 kg (202 lb)   08/13/18 91.6 kg (202 lb)   08/08/18 93.5 kg (206 lb 2.1 oz)              We Performed the Following     IMAGESTREAM RECORDING ORDER     " LARYNGOSCOPY FLEX/RIGID W STROBOSCOPY     [facility] VOICE CLINIC REFERRAL          Today's Medication Changes          These changes are accurate as of 8/13/18 11:59 PM.  If you have any questions, ask your nurse or doctor.               These medicines have changed or have updated prescriptions.        Dose/Directions    omeprazole 40 MG capsule   Commonly known as:  priLOSEC   This may have changed:  Another medication with the same name was removed. Continue taking this medication, and follow the directions you see here.   Used for:  Eosinophilic esophagitis   Changed by:  Seema Eubanks MD        Dose:  40 mg   Take 1 capsule (40 mg) by mouth 2 times daily as needed   Quantity:  60 capsule   Refills:  3            Where to get your medicines      These medications were sent to [place] [address] [place] [facility] [place] - [address]  [address] [place] [address]     Phone:  [phone]     levothyroxine 25 MCG tablet                Primary Care Provider Office Phone # Fax #    Ellyn Rivera [phone]       [address] [address]        Equal Access to Services     [facility]: Hadii aad ku hadasho Soomaali, waaxda luqadaha, qaybta kaalmada adeegyada, waxbrian dykes . So [facility] [phone].    ATENCIÓN: Si habla español, tiene a sweeney disposición servicios gratuitos de asistencia lingüística. Llame al [phone].    We comply with applicable federal civil rights laws and [place] laws. We do not discriminate on the basis of race, color, national origin, age, disability, sex, sexual orientation, or gender identity.            Thank you!     Thank you for choosing [facility] EAR NOSE AND THROAT  for your care. Our goal is always to provide you with excellent care. Hearing back from our patients is one way we can continue to improve our services. Please take a few minutes to complete the written survey that you may receive in the  mail after your visit with us. Thank you!             Your Updated Medication List - Protect others around you: Learn how to safely use, store and throw away your medicines at www.disposemymeds.org.          This list is accurate as of 8/13/18 11:59 PM.  Always use your most recent med list.                   Brand Name Dispense Instructions for use Diagnosis    ACETAMINOPHEN PO      Take 1,000 mg by mouth every 6 hours as needed for pain Take with tramadol        ADVAIR DISKUS 250-50 MCG/DOSE diskus inhaler   Generic drug:  fluticasone-salmeterol      Inhale 1 puff into the lungs 2 times daily as needed        albuterol 108 (90 Base) MCG/ACT inhaler    PROAIR HFA/PROVENTIL HFA/VENTOLIN HFA     Inhale 2 puffs into the lungs every 6 hours as needed        AMITRIPTYLINE HCL PO      Take 40 mg by mouth daily for two weeks, then increase to 50 mg daily.        buprenorphine 10 MCG/HR WK patch    BUTRANS    4 patch    Place 1 patch onto the skin every 7 days    Postlaminectomy syndrome of lumbar region       butalbital-acetaminophen-caffeine -40 MG per tablet    FIORICET/ESGIC     TAKE ONE TABLET BY MOUTH EVERY 4 HOURS AS NEEDED        CEFALY KIT Pauly     1 Device    1 Device daily as needed    Intractable chronic migraine without aura and without status migrainosus       cetirizine 10 MG tablet    zyrTEC     Take 10 mg by mouth daily        COMPRESSION STOCKINGS     3 each    1 each daily 20-30 mmHg Thigh Length measure and fit, color and style per patient preference. Doff n jose per need.    Orthostatic hypotension       DULOXETINE HCL PO      Take 60 mg by mouth in the morning and 30 mg in the evening.        eletriptan 20 MG tablet    RELPAX    18 tablet    Take 1 tablet (20 mg) by mouth at onset of headache for migraine May repeat in 2 hours. Max 4 tablets/24 hours.    Chronic daily headache, Intractable migraine without aura and with status migrainosus       EPINEPHrine 0.3 MG/0.3ML injection 2-pack     EPIPEN/ADRENACLICK/or ANY BX GENERIC EQUIV     Inject 0.3 mg into the muscle        famotidine 20 MG tablet    PEPCID    90 tablet    Take 1 tablet (20 mg) by mouth At Bedtime    Hiatal hernia, Mast cell disorder       fludrocortisone 0.1 MG tablet    FLORINEF    180 tablet    Take 2 tablets (0.2 mg) by mouth daily    Autonomic dysfunction, Pre-syncope       fluticasone 50 MCG/ACT spray    FLONASE    48 mL    INSTILL 2 SPRAYS INTO BOTH NOSTRILS DAILY    Chronic rhinitis, unspecified type       levothyroxine 25 MCG tablet    SYNTHROID    90 tablet    Take 1 tablet (25 mcg) by mouth daily    Hypothyroidism, unspecified type       medical cannabis (Patient's own supply.  Not a prescription)      Take 1 Dose by mouth See Admin Instructions (This is NOT a prescription, and does not certify that the patient has a qualifying medical condition for medical cannabis.  The purpose of this order is  to document that the patient reports taking medical cannabis.)        metoclopramide 5 MG tablet    REGLAN    180 tablet    Take 1-2 tablets (5-10 mg) by mouth every 6 hours as needed    Intractable chronic migraine without aura and without status migrainosus       MULTIVITAMIN PO      Take 1 tablet by mouth daily        OLANZapine 5 MG tablet    zyPREXA    30 tablet    Take 1 tablet (5 mg) by mouth daily as needed (headache pain, nausea)    Other migraine with status migrainosus, intractable, Intractable chronic migraine without aura and without status migrainosus       omeprazole 40 MG capsule    priLOSEC    60 capsule    Take 1 capsule (40 mg) by mouth 2 times daily as needed    Eosinophilic esophagitis       ondansetron 4 MG ODT tab    ZOFRAN-ODT    60 tablet    Take 1-2 tablets (4-8 mg) by mouth every 12 hours as needed for nausea    Migraine without status migrainosus, not intractable, unspecified migraine type       oxyCODONE IR 5 MG tablet    ROXICODONE    15 tablet    Take 0.5 tablets (2.5 mg) by mouth every 6 hours as  needed for severe pain    Postlaminectomy syndrome       PROBIOTIC DAILY PO      Take 2 packets by mouth every morning        Propranolol HCl SR Beads 120 MG Cp24     30 capsule    Take 120 mg by mouth daily    Sinus tachycardia       rizatriptan 5 MG ODT tab    MAXALT-MLT     TAKE 1-2 TABLETS (5-10 MG) BY MOUTH AT ONSET OF HEADACHE FOR MIGRAINE (MAX 30 MG IN 24 HOURS)        tiZANidine 4 MG tablet    ZANAFLEX    120 tablet    Take 1 tablet (4 mg) by mouth 4 times daily as needed for muscle spasms        VITAMIN D (CHOLECALCIFEROL) PO      Take 2,000 Units by mouth daily        ZONISAMIDE PO      Take 75 mg by mouth daily

## 2018-08-13 NOTE — LETTER
8/13/2018       RE: Ada Welch  [address] [place] [address]     Dear Colleague,    Thank you for referring your patient, Ada Welch, to the [facility]. Please see a copy of my visit note below.      [facility]  Evaluation report    Clinician: David Doss M.M., M.A., CCC/SLP  Seen in conjunction with: Dr. Eubanks  Patient: Ada Welch  Date of Visit: 8/13/2018    HISTORY  Chief complaint: Ada Welch is a 47 year old woman presenting today for evaluation of dysphonia, dysphagia,  Cough, breathing difficulties, and throat discomfort.    Onset / inciting incident / course: varies by symptoms see below  Salient history: She has a history significant for Tom-Danlos syndrome, IBS, GERD, eosinophilic esophagitis.    CURRENT SYMPTOMS INCLUDE  VOICE    Lifelong, gradually worsening    Sensation of throat tightness with use    Significant vocal demands as a mom    She associates this with her Tom Danlos    Poor voice quality    Increased effort to use voice    Voice breaks    Changes in pitch, volume, and range    Describes voice as shaky / unsteady, raspy, scratchy    Raspier than it used to be a couple of years ago    COUGH/THROAT CLEARING    Began 10-15 years ago and worsening over time    Frequent cough and throat clearing     Worse in the AM, after heavy voice use, with stress, at meals, and with exertion    Sensation of persistent mucus in the back of her throat   o Not feeling drainage currently    her cough/ throat clearing triggers include:  o Cold air  o Talking  o Laughing  o Eating  o Perfumes and strong smells  o heartburb  o PND  o Stress  o Lying down    SWALLOWING    10 year ago with gradual onset and worsening over time  o Over memorial day weekend she choked on a piece of steak  o States that it took her 45 minutes to bring it back up during which time she couldn't breathe very easily (worse out than  "in)  o Felt stuck at the level of the sternum    Patient feels it is associated with cervical degeneration and reflux     Solids are most difficult  o Meat, any dry texture  o Has to alternate between solids and liquids  o Feels \"slow\" near the level of the sternum    Has adjusted her diet to soft food only at the request of GI    Has tried PPI    Most recent swallow study was 1 year ago    Feels symptoms are worsened, but similiarly characterized since that time    She describes a pattern of choking on foods (solids) for \"30 minutes or more\" sometimes the food will come back up with \"blood clots and mucus\"    BREATHING    Hx of asthma    Takes inhalers daily    Worse in the winter    Does not feel related to her swallowing concerns    Makes a rattling sensation (points to sternal notch) when she feels symptoms    ADDITIONAL    Throat discomfort    Tightness - \"I have to yawn to stretch it out\"    Worse with voice use, stress, and meals    At times it feel sharp at the back of her throat    Persistent globus    OTHER PERTINENT HISTORY    Complex medical history: please also refer to Dr. Eubanks's dictation.     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     Chronic sinusitis 00/2007    Diagnosed with chronic pansinusitis 2016     Chronic tonsillitis 1980    Had tonsils removed 02/1981, rheumatic fever     Tom-Danlos disease      Eosinophilic esophagitis 08/2018     Gastroesophageal reflux disease 3/1/2017    I have large hiatal hernia     Hernia, abdominal 10/2016    Hiatal Hernia     Hiatal hernia 2016    Have adeline hiatel hernia     Hoarseness Unsure    It comes and goes     IBS (irritable bowel syndrome) with constipation     improved on Linzess     " "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     Migraines 2007    Unsure of exact date     Nasal polyps 2013    Were revoved 2017, benign     Other nervous system complications 2014    Autonomic nervous system disorder, neuralgia, neuropathy     Pneumonia Unsure    Have had pneumonia several times     Swelling, mass, or lump in head and neck 2016    Lymph node has been growing for last year     Thyroid disease 2008     Past Surgical History:   Procedure Laterality Date     ADENOIDECTOMY       AS REPAIR OF NASAL SEPTUM       BACK SURGERY  2013  10/01/2014    Spinal fusion L4-S1, L5 moving  in. Remove screws/rods     BIOPSY  2008 & 3/2017    mole biopsy, lymph node biopsy     COLONOSCOPY  3/2017    Colonoscopy and GI     ESOPHAGOSCOPY, GASTROSCOPY, DUODENOSCOPY (EGD), COMBINED N/A 8/3/2018    Procedure: COMBINED ESOPHAGOSCOPY, GASTROSCOPY, DUODENOSCOPY (EGD), BIOPSY SINGLE OR MULTIPLE;;  Surgeon: Juan Woodson MD;  Location:  GI     NASAL/SINUS POLYPECTOMY      Polyps were benign     TONSILLECTOMY       TONSILLECTOMY  1981     TUBAL LIGATION       OBJECTIVE  PATIENT REPORTED MEASURES    Effort to talk: 3 / 10 (0-10 in which 10 represents maximal effort)    Effort to sin / 10 (0-10 in which 10 represents maximal effort)    Voice quality: 5 / 10 (0-10 in which 10 represents best possible voice)  Patient Supplied Answers To VHI Questionnaire  Voice Handicap Index (VHI-10) 2018   My voice makes it difficult for people to hear me 3   People have difficulty understanding me in a noisy room 3   My voice difficulties restrict my personal and social life.  0   I feel left out of conversations because of my voice 0   My voice problem causes me to lose income 0   I feel as though I have to strain to produce voice 3   The clarity of my voice is unpredictable 3   My voice problem upsets me 0   My voice makes me feel handicapped 0   People ask, \"What's " "wrong with your voice?\" 3   VHI-10 15     Patient Supplied Answers To CSI Questionnaire  Cough Severity Index (CSI) 8/13/2018   My cough is worse when I lie down 3   My coughing problem causes me to restrict my personal and social life 0   I tend to avoid places because of my cough problem 2   I feel embarrassed because of my coughing problem 0   People ask, ''What's wrong?'' because I cough a lot 1   I run out of air when I cough 2   My coughing problem affects my voice 2   My coughing problem limits my physical activity 2   My coughing problem upsets me 0   People ask me if I am sick because I cough a lot 2   CSI Score 14     Patient Supplied Answers To EAT Questionnaire  Eating Assessment Tool (EAT-10) 8/13/2018   My swallowing problem has caused me to lose weight 0   My swallowing problem interferes with my ability to go out for meals 4   Swallowing liquids takes extra effort 2   Swallowing solids takes extra effort 4   Swallowing pills takes extra effort 3   Swallowing is painful 2   The pleasure of eating is affected by my swallowing 3   When I swallow food sticks in my throat 4   I cough when I eat 2   Swallowing is stressful 2   EAT-10 26     PERCEPTUAL EVALUATION (CPT 71476)  POSTURE / TENSION:     neck and shoulders    BREATHING:     shallow    phonation is not coordinated with respiration    LARYNGEAL PALPATION:     tenderness of the thyrohyoid area    reduced thyrohyoid space    VOICE:    Roughness: Mild Intermittent    Breathiness: Mild Consistent    Strain: Mild to moderate Consistent    Loudness    Conversational speech:  Mildly reduced    Pitch:    Conversational speech:  WNL    Pitch glide:     Adequate access to loft register    Increased breathiness with elevated F0    Resonance:    Conversational speech:  laryngeal pharyngeal resonance    CAPE-V Overall Severity:  36/100    COUGH/THROAT CLEARING:    Occasional    Dry    THERAPY PROBES: Improvement was elicited with use of forward resonant stimuli " and coordination of respiration and phonation    **Due to timing constraints laryngeal function studies were unable to be completed today; however, they will be completed prior to the patient's first scheduled session**    LARYNGEAL EXAMINATION  Procedure: Flexible endoscopy with chip-tip technology with stroboscopy, left nostril; topical anesthesia with 3% Lidocaine and 0.25% phenylephrine was applied.   Performed by: Dr. Eubanks   The laryngeal and pharyngeal structures were evaluated for gross appearance, mobility, function, and focal lesions / abnormalities of the associated mucosa.  Stroboscopy was warranted to evaluate closure, symmetry, and vibratory characteristics of the vocal folds.  All findings were within normal limits with the exception of the following salient features:     Mildly and diffusely thickened secretions sporadically throughout the supraglottis    Subtle mid-membranous irregularity of the vocal fold vibratory margins (right moreso than left)    Mild to moderate supraglottic hyperfunction during running speech with R>L ventricular recruitment    Reduced hyperfunction with forward resonant stimuli and coordination of respiration and phonation    Stroboscopy demonstrates:    Open phase predominate closure    Mildly reduced mucosal wave bilaterally    Intermittent phase asymmetry         The laryngeal exam was reviewed with Ms. Welch, and I provided pertinent explanations, as well as written and oral information.    ASSESSMENT / PLAN  IMPRESSIONS: Ada Welch is presenting today with R49.0 (Dysphonia) and a sensation of food sticking at the mid-sternum in the context of  Bilateral vocal fold stiffness, J38.7 (Irritable Larynx Syndrome) and a complex medical history. Laryngeal evaluation demonstrates subtle irregularity of the vocal fold mucosa with corresponding stiffness, consistent with patterns of phonotrauma and laryngeal hyperfunction.  Although not directly correlated to her immediate  swallowing concerns, patient's globus sensation and throat discomfort are related to patterns of laryngeal and perilaryngeal tension seen on exam during phonatory tasks.  There was no behavioral or physiologic cause visible at the level of the larynx for patient's swallowing concerns.     STIMULABILITY: results of therapy probes during perceptual and laryngeal evaluation demonstrate improvement with use of forward resonant stimuli and coordination of respiration and phonation    RECOMMENDATIONS:     A course of speech therapy is recommended to optimize vocal technique, promote reduced discomfort, effort and fatigue and help reduce cough / throat clearing, and pattens of hyperfunction which contribute to globus sensation.    With regards to the patient's primary swallowing complaints, we recommend a repeat swallow study given worsening symptoms since her last evaluation in October of last year. As well as ongoing management with GI for possible esophageal involvement    She demonstrates a Good prognosis for improvement given adherence to therapeutic recommendations.     Positive indicators: positive response to therapy probes diagnosis is known to respond to treatment    Negative indicators: concomitant health problems.    DURATION / FREQUENCY: 4 bi weekly and 2 monthly one-hour sessions    GOALS:  Patient goal:   1. To improve and maintain a healthy voice quality  2. To understand the problem and fix it as much as possible    Short-term goal(s): Within the first 4 sessions, Ms. Welch:  1. will demonstrate assigned laryngeal massage techniques with 80% accuracy or better with no clinician support  2. will be able to demonstrate provided cough suppression and substitution strategies from memory independently with 90% accuracy  3. will be able to independently list key factors in maintenance of good vocal hygiene with 80% accuracy, and report on their use outside the therapy room.  4. will utilize silent inhalation  with good low-respiratory engagement 75% of the time during therapy tasks with minimal clinician support  5. will demonstrate semi-occluded vocal tract (SOVT) exercises with at least 80% accuracy with no clinician support  6. will demonstrate the ability to alternate between target and habitual voice quality given clinician cue 75% of the time during therapy tasks    Long-term goal(s): In 6 months, Ms. Welch will:  1. Report a week of typical vocal activities, in which dysphonia and throat discomfort do not exceed a level of 2 out of 10, 80% of the time     This treatment plan was developed with the patient who agreed with the recommendations.      TOTAL SERVICE TIME: 60 minutes  EVALUATION OF VOICE AND RESONANCE (97429)  NO CHARGE FACILITY FEE (41309)    David Doss M.M., M.A., CCC-SLP  Speech-Language Pathologist  Certificate of Vocology  [phone]    *this report was created in part through the use of computerized dictation software, and though reviewed following completion, some typographic errors may persist.  If there is confusion regarding any of this notes contents, please contact me for clarification.*

## 2018-08-13 NOTE — PROGRESS NOTES
"[facility]  Evaluation report    Clinician: David Doss M.M., M.A., CCC/SLP  Seen in conjunction with: Dr. Eubanks  Patient: Ada Welch  Date of Visit: 8/13/2018    HISTORY  Chief complaint: Ada Welch is a 47 year old woman presenting today for evaluation of dysphonia, dysphagia,  Cough, breathing difficulties, and throat discomfort.    Onset / inciting incident / course: varies by symptoms see below  Salient history: She has a history significant for Tom-Danlos syndrome, IBS, GERD, eosinophilic esophagitis.    CURRENT SYMPTOMS INCLUDE  VOICE    Lifelong, gradually worsening    Sensation of throat tightness with use    Significant vocal demands as a mom    She associates this with her Tom Danlos    Poor voice quality    Increased effort to use voice    Voice breaks    Changes in pitch, volume, and range    Describes voice as shaky / unsteady, raspy, scratchy    Raspier than it used to be a couple of years ago    COUGH/THROAT CLEARING    Began 10-15 years ago and worsening over time    Frequent cough and throat clearing     Worse in the AM, after heavy voice use, with stress, at meals, and with exertion    Sensation of persistent mucus in the back of her throat   o Not feeling drainage currently    her cough/ throat clearing triggers include:  o Cold air  o Talking  o Laughing  o Eating  o Perfumes and strong smells  o heartburb  o PND  o Stress  o Lying down    SWALLOWING    10 year ago with gradual onset and worsening over time  o Over memorial day weekend she choked on a piece of steak  o States that it took her 45 minutes to bring it back up during which time she couldn't breathe very easily (worse out than in)  o [place] stuck at the level of the sternum    Patient feels it is associated with cervical degeneration and reflux     Solids are most difficult  o Meat, any dry texture  o Has to alternate between solids and liquids  o Feels \"slow\" near the level of the sternum    Has adjusted " "her diet to soft food only at the request of GI    Has tried PPI    Most recent swallow study was 1 year ago    Feels symptoms are worsened, but similiarly characterized since that time    She describes a pattern of choking on foods (solids) for \"30 minutes or more\" sometimes the food will come back up with \"blood clots and mucus\"    BREATHING    Hx of asthma    Takes inhalers daily    Worse in the winter    Does not feel related to her swallowing concerns    Makes a rattling sensation (points to sternal notch) when she feels symptoms    ADDITIONAL    Throat discomfort    Tightness - \"I have to yawn to stretch it out\"    Worse with voice use, stress, and meals    At times it feel sharp at the back of her throat    Persistent globus    OTHER PERTINENT HISTORY    Complex medical history: please also refer to Dr. Eubanks's dictation.     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     Chronic sinusitis 00/2007    Diagnosed with chronic pansinusitis 2016     Chronic tonsillitis 1980    Had tonsils removed 02/1981, rheumatic fever     Tom-Danlos disease      Eosinophilic esophagitis 08/2018     Gastroesophageal reflux disease 3/1/2017    I have large hiatal hernia     Hernia, abdominal 10/2016    Hiatal Hernia     Hiatal hernia 2016    Have adeline hiatel hernia     Hoarseness Unsure    It comes and goes     IBS (irritable bowel syndrome) with constipation     improved on Linzess     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     Migraines 1/1/2007    Unsure of exact date     Nasal polyps 2013    Were revoved 03/2017, benign     Other nervous system complications 1/2014    Autonomic nervous system disorder, neuralgia, neuropathy     " "Pneumonia Unsure    Have had pneumonia several times     Swelling, mass, or lump in head and neck 2016    Lymph node has been growing for last year     Thyroid disease 2008     Past Surgical History:   Procedure Laterality Date     ADENOIDECTOMY       AS REPAIR OF NASAL SEPTUM       BACK SURGERY  2013  10/01/2014    Spinal fusion L4-S1, L5 moving 58ths in. Remove screws/rods     BIOPSY  2008 & 3/2017    mole biopsy, lymph node biopsy     COLONOSCOPY  3/2017    Colonoscopy and GI     ESOPHAGOSCOPY, GASTROSCOPY, DUODENOSCOPY (EGD), COMBINED N/A 8/3/2018    Procedure: COMBINED ESOPHAGOSCOPY, GASTROSCOPY, DUODENOSCOPY (EGD), BIOPSY SINGLE OR MULTIPLE;;  Surgeon: Juan Woodson MD;  Location:  GI     NASAL/SINUS POLYPECTOMY      Polyps were benign     TONSILLECTOMY       TONSILLECTOMY       TUBAL LIGATION       OBJECTIVE  PATIENT REPORTED MEASURES    Effort to talk: 3 / 10 (0-10 in which 10 represents maximal effort)    Effort to sin / 10 (0-10 in which 10 represents maximal effort)    Voice quality: 5 / 10 (0-10 in which 10 represents best possible voice)  Patient Supplied Answers To VHI Questionnaire  Voice Handicap Index (VHI-10) 2018   My voice makes it difficult for people to hear me 3   People have difficulty understanding me in a noisy room 3   My voice difficulties restrict my personal and social life.  0   I feel left out of conversations because of my voice 0   My voice problem causes me to lose income 0   I feel as though I have to strain to produce voice 3   The clarity of my voice is unpredictable 3   My voice problem upsets me 0   My voice makes me feel handicapped 0   People ask, \"What's wrong with your voice?\" 3   VHI-10 15     Patient Supplied Answers To CSI Questionnaire  Cough Severity Index (CSI) 2018   My cough is worse when I lie down 3   My coughing problem causes me to restrict my personal and social life 0   I tend to avoid places because of my " cough problem 2   I feel embarrassed because of my coughing problem 0   People ask, ''What's wrong?'' because I cough a lot 1   I run out of air when I cough 2   My coughing problem affects my voice 2   My coughing problem limits my physical activity 2   My coughing problem upsets me 0   People ask me if I am sick because I cough a lot 2   CSI Score 14     Patient Supplied Answers To EAT Questionnaire  Eating Assessment Tool (EAT-10) 8/13/2018   My swallowing problem has caused me to lose weight 0   My swallowing problem interferes with my ability to go out for meals 4   Swallowing liquids takes extra effort 2   Swallowing solids takes extra effort 4   Swallowing pills takes extra effort 3   Swallowing is painful 2   The pleasure of eating is affected by my swallowing 3   When I swallow food sticks in my throat 4   I cough when I eat 2   Swallowing is stressful 2   EAT-10 26     PERCEPTUAL EVALUATION (CPT 58009)  POSTURE / TENSION:     neck and shoulders    BREATHING:     shallow    phonation is not coordinated with respiration    LARYNGEAL PALPATION:     tenderness of the thyrohyoid area    reduced thyrohyoid space    VOICE:    Roughness: Mild Intermittent    Breathiness: Mild Consistent    Strain: Mild to moderate Consistent    Loudness    Conversational speech:  Mildly reduced    Pitch:    Conversational speech:  WNL    Pitch glide:     Adequate access to loft register    Increased breathiness with elevated F0    Resonance:    Conversational speech:  laryngeal pharyngeal resonance    CAPE-V Overall Severity:  36/100    COUGH/THROAT CLEARING:    Occasional    Dry    THERAPY PROBES: Improvement was elicited with use of forward resonant stimuli and coordination of respiration and phonation    **Due to timing constraints laryngeal function studies were unable to be completed today; however, they will be completed prior to the patient's first scheduled session**    LARYNGEAL EXAMINATION  Procedure: Flexible endoscopy  with chip-tip technology with stroboscopy, left nostril; topical anesthesia with 3% Lidocaine and 0.25% phenylephrine was applied.   Performed by: Dr. Eubanks   The laryngeal and pharyngeal structures were evaluated for gross appearance, mobility, function, and focal lesions / abnormalities of the associated mucosa.  Stroboscopy was warranted to evaluate closure, symmetry, and vibratory characteristics of the vocal folds.  All findings were within normal limits with the exception of the following salient features:     Mildly and diffusely thickened secretions sporadically throughout the supraglottis    Subtle mid-membranous irregularity of the vocal fold vibratory margins (right moreso than left)    Mild to moderate supraglottic hyperfunction during running speech with R>L ventricular recruitment    Reduced hyperfunction with forward resonant stimuli and coordination of respiration and phonation    Stroboscopy demonstrates:    Open phase predominate closure    Mildly reduced mucosal wave bilaterally    Intermittent phase asymmetry         The laryngeal exam was reviewed with Ms. Welch, and I provided pertinent explanations, as well as written and oral information.    ASSESSMENT / PLAN  IMPRESSIONS: Ada Welch is presenting today with R49.0 (Dysphonia) and a sensation of food sticking at the mid-sternum in the context of  Bilateral vocal fold stiffness, J38.7 (Irritable Larynx Syndrome) and a complex medical history. Laryngeal evaluation demonstrates subtle irregularity of the vocal fold mucosa with corresponding stiffness, consistent with patterns of phonotrauma and laryngeal hyperfunction.  Although not directly correlated to her immediate swallowing concerns, patient's globus sensation and throat discomfort are related to patterns of laryngeal and perilaryngeal tension seen on exam during phonatory tasks.  There was no behavioral or physiologic cause visible at the level of the larynx for patient's swallowing  concerns.     STIMULABILITY: results of therapy probes during perceptual and laryngeal evaluation demonstrate improvement with use of forward resonant stimuli and coordination of respiration and phonation    RECOMMENDATIONS:     A course of speech therapy is recommended to optimize vocal technique, promote reduced discomfort, effort and fatigue and help reduce cough / throat clearing, and pattens of hyperfunction which contribute to globus sensation.    With regards to the patient's primary swallowing complaints, we recommend a repeat swallow study given worsening symptoms since her last evaluation in October of last year. As well as ongoing management with GI for possible esophageal involvement    She demonstrates a Good prognosis for improvement given adherence to therapeutic recommendations.     Positive indicators: positive response to therapy probes diagnosis is known to respond to treatment    Negative indicators: concomitant health problems.    DURATION / FREQUENCY: 4 bi weekly and 2 monthly one-hour sessions    GOALS:  Patient goal:   1. To improve and maintain a healthy voice quality  2. To understand the problem and fix it as much as possible    Short-term goal(s): Within the first 4 sessions, Ms. Welch:  1. will demonstrate assigned laryngeal massage techniques with 80% accuracy or better with no clinician support  2. will be able to demonstrate provided cough suppression and substitution strategies from memory independently with 90% accuracy  3. will be able to independently list key factors in maintenance of good vocal hygiene with 80% accuracy, and report on their use outside the therapy room.  4. will utilize silent inhalation with good low-respiratory engagement 75% of the time during therapy tasks with minimal clinician support  5. will demonstrate semi-occluded vocal tract (SOVT) exercises with at least 80% accuracy with no clinician support  6. will demonstrate the ability to alternate between  target and habitual voice quality given clinician cue 75% of the time during therapy tasks    Long-term goal(s): In 6 months, Ms. Welch will:  1. Report a week of typical vocal activities, in which dysphonia and throat discomfort do not exceed a level of 2 out of 10, 80% of the time     This treatment plan was developed with the patient who agreed with the recommendations.      TOTAL SERVICE TIME: 60 minutes  EVALUATION OF VOICE AND RESONANCE (82747)  NO CHARGE FACILITY FEE (84537)    David Doss M.M., M.A., CCC-SLP  Speech-Language Pathologist  Certificate of Vocology  [phone]    *this report was created in part through the use of computerized dictation software, and though reviewed following completion, some typographic errors may persist.  If there is confusion regarding any of this notes contents, please contact me for clarification.*

## 2018-08-13 NOTE — NURSING NOTE
Chief Complaint   Patient presents with     Consult     dysphagia, food stuck in throat when eating.       Kole Lynne RN

## 2018-08-13 NOTE — PROGRESS NOTES
Dear Dr. Galicia:    I had the pleasure of meeting Ada Welch in consultation at the [facility] at your request, for evaluation of dysphagia. The note from our visit follows. Speech recognition software may have been used in the documentation below; input is reviewed before signature to the best of my ability. I appreciate the opportunity to participate in the care of this pleasant patient.    Please feel free to contact me with any questions.    Sincerely yours,    Seema Eubanks M.D., M.P.H.  , Laryngology  Director, [facility]  Otolaryngology- Head & Neck Surgery  [phone]          =====    History of Present Illness:   Ada Welch is a pleasant 47-year-old female with immunosuppression, Tom-Danlos syndrome, hiatal hernia, eosinophilic esophagitis, dysautonomia, who presents with a history of dysphagia. Symptoms include increased effort to talk/sing, throat irritation, throat tightness, pain/ache in throat, dry throat, lump in throat, mucus in throat, frequent throat-clearing, frequent cough, shortness of breath, poor voice quality, voice tires easily, voice breaks, change in vocal pitch, change in vocal volume, change in range, shaky/unsteady voice, raspy voice and scratchy voice.    Voice  She reports a nearly lifelong history of rapid vocal fatigue, which she associates possibly with her Tom Danlos. She feels like she needs to drink water frequently when she has voice use. Her voice is worse after heavy voice use but also variable and unpredictable. The problem is worsening over time. Typical vocal demand is intermittent and includes parenting and participating in her medical care. She considers the voice to be a big problem. Singing vocal effort is particularly increased.      Swallowing  She reports a 10 year history of swallow problems which came on around the time she was  noted to have cervical degeneration and chronic pansinusitis. She wonders if reflux may be a contributing factor. Her swallowing is also worse with heavy voice use and with stress. Things feel like they go down the wrong tube on a weekly basis.    She had a prior upper GI in 2017 that showed esophagitis, trachealization of esophagus, hiatal hernia, and eosinophilia.    She had exacerbation of dysphagia three months ago.  Solid bolus (steak) got stuck in her throat.  She was ultimately able to bring it back up but it was accompanied with blood clots at that time.  She feels that things get stuck at the mid-sternal level.  She does experience increased swallowing effort with all textures.  She currently takes only soft/pureed foods.    Her most recent swallow study was in October 2017.  She states that her understanding is that Dr. Galicia had recommended that she be seen in ENT for her throat concerns.      Cough/Throat-clearing  She reports chronic cough/throat clearing associated with the sensation of mucus in her throat. The symptoms are variable and unpredictable at times but also worse with voice use, stress, meals, and exertion. This problem is also worsening over time. There is typically a preceding tickle and the urge to cough is controllable about 50% of the time. She notes multiple triggers for the cough including cold air, voice use, and strong smells. Steroid inhaler has been used but the cough as persisted.    Breathing  She reports a 30 year history of breathing problems. She has asthma that she feels is well managed.  She feels a rattle in her chest when her breathing is bad.  No stridor (this was confirmed with the patient given potential inconsistency with intake forms).      Throat discomfort  She also reports throat discomfort over the past 30+ years; this is worse with heavy voice use, stress, meals, and exertion as well. She also has sharp throat pain and the feeling that something is stuck. She  also notes a chronic globus sensation.      Reflux-type symptoms  She experiences heartburn/indigestion daily. She is taking reflux medications.  She is now following with Dr. Griffith in Gastroenterology, and the current plan is a higher dose of proton pump inhibitors with a possible plan for empiric dilation if the mid-sternal food sticking dysphagia persists.      Prior [facility] were reviewed for this visit. She recently saw Dr. Killian for evaluation of lymphadenopathy. She has also seen Dr. Barahona for sinusitis.      MEDICATIONS:     Current Outpatient Prescriptions   Medication Sig Dispense Refill     ACETAMINOPHEN PO Take 1,000 mg by mouth every 6 hours as needed for pain Take with tramadol       albuterol (PROAIR HFA/PROVENTIL HFA/VENTOLIN HFA) 108 (90 BASE) MCG/ACT Inhaler Inhale 2 puffs into the lungs every 6 hours as needed        AMITRIPTYLINE HCL PO Take 40 mg by mouth daily for two weeks, then increase to 50 mg daily.       buprenorphine (BUTRANS) 10 MCG/HR WK patch Place 1 patch onto the skin every 7 days 4 patch 2     butalbital-acetaminophen-caffeine (FIORICET/ESGIC) -40 MG per tablet TAKE ONE TABLET BY MOUTH EVERY 4 HOURS AS NEEDED  0     cetirizine (ZYRTEC) 10 MG tablet Take 10 mg by mouth daily        COMPRESSION STOCKINGS 1 each daily 20-30 mmHg Thigh Length measure and fit, color and style per patient preference. Doff n jose per need. 3 each 3     DULOXETINE HCL PO Take 60 mg by mouth in the morning and 30 mg in the evening.       eletriptan (RELPAX) 20 MG tablet Take 1 tablet (20 mg) by mouth at onset of headache for migraine May repeat in 2 hours. Max 4 tablets/24 hours. 18 tablet 1     EPINEPHrine (EPIPEN/ADRENACLICK/OR ANY BX GENERIC EQUIV) 0.3 MG/0.3ML injection 2-pack Inject 0.3 mg into the muscle       famotidine (PEPCID) 20 MG tablet Take 1 tablet (20 mg) by mouth At Bedtime 90 tablet 1     fludrocortisone (FLORINEF) 0.1 MG tablet Take 2 tablets (0.2 mg) by mouth daily 180 tablet  3     fluticasone (FLONASE) 50 MCG/ACT spray INSTILL 2 SPRAYS INTO BOTH NOSTRILS DAILY 48 mL 3     fluticasone-salmeterol (ADVAIR DISKUS) 250-50 MCG/DOSE diskus inhaler Inhale 1 puff into the lungs 2 times daily as needed        levothyroxine (SYNTHROID) 25 MCG tablet Take 25 mcg by mouth daily       linaclotide (LINZESS) 145 MCG capsule Take 1 capsule (145 mcg) by mouth every morning (before breakfast) Last refill until GI clinic follow up. 30 capsule 0     medical cannabis (Patient's own supply.  Not a prescription) Take 1 Dose by mouth See Admin Instructions (This is NOT a prescription, and does not certify that the patient has a qualifying medical condition for medical cannabis.  The purpose of this order is  to document that the patient reports taking medical cannabis.)       metoclopramide (REGLAN) 5 MG tablet Take 1-2 tablets (5-10 mg) by mouth every 6 hours as needed 180 tablet 1     Multiple Vitamins-Minerals (MULTIVITAMIN PO) Take 1 tablet by mouth daily        Nerve Stimulator (CEFALY KIT) KAROLINE 1 Device daily as needed 1 Device 0     OLANZapine (ZYPREXA) 5 MG tablet Take 1 tablet (5 mg) by mouth daily as needed (headache pain, nausea) 30 tablet 3     omeprazole (PRILOSEC) 40 MG capsule Take 1 capsule (40 mg) by mouth 2 times daily as needed 60 capsule 3     ondansetron (ZOFRAN-ODT) 4 MG ODT tab Take 1-2 tablets (4-8 mg) by mouth every 12 hours as needed for nausea 60 tablet 1     oxyCODONE IR (ROXICODONE) 5 MG tablet Take 0.5 tablets (2.5 mg) by mouth every 6 hours as needed for severe pain 15 tablet 0     Probiotic Product (PROBIOTIC DAILY PO) Take 2 packets by mouth every morning        Propranolol HCl SR Beads 120 MG CP24 Take 120 mg by mouth daily 30 capsule 11     rizatriptan (MAXALT-MLT) 5 MG ODT tab TAKE 1-2 TABLETS (5-10 MG) BY MOUTH AT ONSET OF HEADACHE FOR MIGRAINE (MAX 30 MG IN 24 HOURS)  6     SUMAtriptan (IMITREX STATDOSE) 6 MG/0.5ML pen injector kit Inject 0.5 mLs (6 mg) Subcutaneous at  onset of headache for migraine (may repeat once after 2 hrs) May repeat in 1 hour. Max 12 mg/24 hours. 2 kit 1     tiZANidine (ZANAFLEX) 4 MG tablet Take 1 tablet (4 mg) by mouth 4 times daily as needed for muscle spasms 120 tablet 3     traMADol (ULTRAM) 50 MG tablet Take 1 tablet (50 mg) by mouth every 6 hours as needed for severe pain (Take scheduled every 6 hours for one week, then resume taking as needed) 60 tablet 0     VITAMIN D, CHOLECALCIFEROL, PO Take 2,000 Units by mouth daily       ZONISAMIDE PO Take 75 mg by mouth daily         ALLERGIES:    Allergies   Allergen Reactions     Bee Venom Anaphylaxis, Difficulty breathing, Palpitations, Shortness Of Breath and Visual Disturbance     Patient does carry Epi-Pen     No Clinical Screening - See Comments Anaphylaxis     Chicken-Derived Products (Egg) Diarrhea and Nausea     Patient will self monitor  Other reaction(s): Nausea  Patient will self monitor  Other reaction(s): GI intolerance     Compazine [Prochlorperazine] Other (See Comments)     Extreme jittery     Gabapentin      Gluten Meal Other (See Comments)     Topiramate      Wheat Diarrhea     Wheat Bran Nausea     Patient will self monitor  Other reaction(s): Nausea  Patient will self monitor       PAST MEDICAL HISTORY:   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     Chronic sinusitis 00/2007    Diagnosed with chronic pansinusitis 2016     Chronic tonsillitis 1980    Had tonsils removed 02/1981, rheumatic fever     Tom-Danlos disease      Eosinophilic esophagitis 08/2018     Gastroesophageal reflux disease 3/1/2017    I have large hiatal hernia     Hernia, abdominal 10/2016    Hiatal Hernia     Hiatal hernia 2016    Have adeline hiatel hernia      Hoarseness Unsure    It comes and goes     IBS (irritable bowel syndrome) with constipation     improved on Linzess     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     Migraines 1/1/2007    Unsure of exact date     Nasal polyps 2013    Were revoved 03/2017, benign     Other nervous system complications 1/2014    Autonomic nervous system disorder, neuralgia, neuropathy     Pneumonia Unsure    Have had pneumonia several times     Swelling, mass, or lump in head and neck 08/2016    Lymph node has been growing for last year     Thyroid disease 01/01/2008    TMJ  Pain disorder      PAST SURGICAL HISTORY:   Past Surgical History:   Procedure Laterality Date     ADENOIDECTOMY  1981     AS REPAIR OF NASAL SEPTUM       BACK SURGERY  12/09/2013  10/01/2014    Spinal fusion L4-S1, L5 moving 5/8ths in. Remove screws/rods     BIOPSY  01/01/2008 & 3/2017    mole biopsy, lymph node biopsy     COLONOSCOPY  3/2017    Colonoscopy and GI     ESOPHAGOSCOPY, GASTROSCOPY, DUODENOSCOPY (EGD), COMBINED N/A 8/3/2018    Procedure: COMBINED ESOPHAGOSCOPY, GASTROSCOPY, DUODENOSCOPY (EGD), BIOPSY SINGLE OR MULTIPLE;;  Surgeon: Juan Woodson MD;  Location:  GI     NASAL/SINUS POLYPECTOMY  2017    Polyps were benign     TONSILLECTOMY       TONSILLECTOMY  1981     TUBAL LIGATION     Cardiac device in situ- [employer] Implanted Loop Recorder (ILR)    HABITS/SOCIAL HISTORY:    Social History   Substance Use Topics     Smoking status: Former Smoker     Packs/day: 0.20     Years: 10.00     Types: Cigarettes     Start date: 1/1/1991     Quit date: 12/1/2013     Smokeless tobacco: Never Used      Comment: I somked on and off during specified dates.     Alcohol use Yes      Comment: occasional         FAMILY HISTORY:    Family History   Problem Relation Age of Onset     Connective Tissue Disorder Mother      eds     Connective Tissue Disorder Sister      eds     Cancer Father      Spinal tumor grew into spinal cord, went in  brain     Migraines Father      Diabetes Maternal Grandmother      Elderly diabetes     HEART DISEASE Maternal Grandmother      Hypertension Maternal Grandmother      Diabetes Paternal Grandmother      Elderly diabetes     Diabetes Paternal Grandfather      Elderly diabetes     Asthma Sister      Pediatric Asthma     Migraines Sister      Asthma Son      Pediatric Asthma     Thyroid Disease Sister      ,     Migraines Sister      Migraines Sister      Breast Cancer No family hx of        REVIEW OF SYSTEMS:  The patient completed a comprehensive 11 point review of systems (below), which was reviewed. Positives are as noted below; pertinent findings are as noted in the HPI.     Patient Supplied Answers to Review of Systems   ENT ROS 8/2/2018   Constitutional Weight gain, Unexplained fatigue, Problems with sleep, Unexplained fever or night sweats   Neurology Dizzy spells, Numbness, Unexplained weakness, Headache   Eyes Double vision   Ears, Nose, Throat Ear pain, Ringing/noise in ears, Nasal congestion or drainage, Trouble swallowing, Hoarseness   Cardiopulmonary Cough, Breathing problems, Wheezing, Chest pain   Gastrointestinal/Genitourinary Heartburn/indigestion, Unexplained nausea/vomiting, Constipation, Diarrhea   Musculoskeletal Sore or stiff joints, Swollen joints, Back pain, Neck pain, Swollen legs/feet   Allergy/Immunology Allergies or hay fever, Rash   Hematologic Bleeding problems, Easy bruising, Lymph node swelling   Endocrine Thirst, Heat or cold intolerance, Frequent urination   Skin Change in moles, Skin lesions   Other -       PHYSICAL EXAMINATION:  General: The patient was alert and conversant, and in no acute distress.    Eyes: PERRL, conjunctiva and lids normal, sclera nonicteric.  Nose: Anterior rhinoscopy: no gross abnormalities. no  bleeding; no  mucopurulence; septum grossly normal, mild mucoid drainage and/or crusting.  Oral cavity/oropharynx: No masses or lesions. Dentition in good condition.  Floor of mouth and oral tongue soft to palpation. Tongue mobility and palate elevation intact and symmetric.  Ears: Normal auricles, external auditory canals bilaterally. Visualized portions of tympanic membranes normal bilaterally.   Neck: No palpable cervical lymphadenopathy. There was moderate  tenderness to palpation of the thyrohyoid space, which was narrow. No obvious thyroid abnormality. Landmarks palpable.  Resp: Breathing comfortably, no stridor or stertor.  Neuro: Symmetric facial function. Other cranial nerves as documented above.  Psych: Normal affect, pleasant and cooperative.  Voice/speech: Mild to moderate dysphonia characterized by breathiness, roughness, strain and delayed onsets.  Extremities: No cyanosis, clubbing, or edema of the upper extremities.    Intake scores  Total Score for Last Patient-Answered VHI Questionnaire  VHI Total Score 8/13/2018   VHI Total Score 12     Total Score for Last Patient-Answered EAT Questionnaire  EAT Total Score 8/13/2018   EAT Total Score 14     Total Score for Last Patient-Answered CSI Questionnaire  CSI Total Score 8/13/2018   CSI Total Score 10       PROCEDURE:   Flexible fiberoptic laryngoscopy and laryngovideostroboscopy  Indications: This procedure was warranted to evaluate the patient's laryngeal anatomy and function. Risks, benefits, and alternatives were discussed.  Description: After written informed consent was obtained, a time-out was performed to confirm patient identity, procedure, and procedure site. Topical 3% lidocaine with 0.25% phenylephrine was applied to the nasal cavities. I performed the endoscopy and no complications were apparent. Continuous and stroboscopic light were utilized to assess routine phonation and variable frequency phonation.  Performed by: Seema Eubanks MD MPH  SLP: David Doss MM, MA, CCC-SLP   Findings: Normal nasopharynx. No significant mucus. Normal base of tongue, valleculae, and epiglottis. Vocal fold  "mobility: right: normal; left: normal. Medial edges of the vocal folds: smooth with mild prominence of the mid vocal fold. No focal mucosal lesions were observed on the true vocal folds. Glissade produced appropriate elongation. There was mild to moderate supraglottic recruitment with connected speech. Mucosa of false vocal folds, aryepiglottic folds, piriform sinuses, and posterior glottis unremarkable. Airway was patent. Response to the therapy probes was good. No focal mucosal lesions on NBI.    The addition of stroboscopy allowed evaluation of the mucosal wave.   Amplitude: right: mildly decreased; left: mildly decreased. Symmetry: intermittent symmetry. Right>left true vocal fold with mild infraglottic irregular prominence, no mucosal irregularity. Closure pattern: complete and irregular. Closure plane: at glottic level. Phase distribution: normal.             IMAGING/RESULTS reviewed, with pertinent report excerpts below:  MBSS 10/6/17  Impression:   1. Deep penetration with thin liquid barium. No aspiration. Please see  the speech pathologist report for further details regarding the  swallow study.  2. Redemonstration of moderate hiatal hernia which appears to be mixed  sliding and paraesophageal type  SLP: \"Ms Welch demonstrates safe functional swallow at this time. She is noted to have intermittent deep penetration to the vocal folds on thin liquid trials which spontaneously resolves. No aspiration noted on any trials presented. Pt demonstrates timely swallow and minimal pharyngeal residue which is cleared with spontaneous second swallow. Adequate oral manipulation of each bolus. Pt removed her Aspen C Collar during study however reports occasionally eating/drinking with C Collar inplace. Esophagram completed following VFSS. Please refer to Radiologist report for further details. Recommend pt continue regular consistency diet with thin liquids. Encouraged softer meat and increased moisture to her foods to " "make them easier to swallow. Pt to sit upright for all po intake. Encouraged small bites/sips and slow rate. No further SLP services indicated at this time.\"        IMPRESSION AND PLAN:   Ada Welch presents with mild right infraglottic mid vocal fold prominence and bilateral vocal fold stiffness as well as irritable larynx and mid-sternal food-sticking dysphagia, in the context of a complex past medical history including Tom-Danlos and immunosuppression. She is working with Gastroenterology (Dr. Griffith) for her esophageal dysphagia, with a current plan for increased proton pump inhibitor dosing and possible subsequent dilation.    Recommendations:  1) I recommended speech therapy as primary management for the laryngeal components of her symptoms, with goals including reducing laryngeal irritability, improving laryngeal efficiency, decreasing vocal fold trauma and improving respiratory/phonatory coordination.    2) Repeat swallow study given deep penetration noted on prior swallow study in October 2017. I recommended that she schedule this after she has had a few sessions of speech therapy, so that her swallow can be assessed when her laryngeal efficiency is more optimized.    The timing of her return will depend on outcomes of the steps noted above. I appreciate the opportunity to participate in the care of this pleasant patient.          "

## 2018-08-13 NOTE — MR AVS SNAPSHOT
After Visit Summary   8/13/2018    Ada Welch    MRN: [id number]           Patient Information     Date Of Birth          1970        Visit Information        Provider Department      8/13/2018 1:40 PM Shmuel Doss SLP [facility] Voice        Today's Diagnoses     Dysphonia    -  1    Irritable larynx syndrome           Follow-ups after your visit        Your next 10 appointments already scheduled     Aug 15, 2018  2:45 PM CDT   (Arrive by 2:30 PM)   Return Visit with Zoe Barahona MD   [facility] ([facility])    [address]  4th [address]  [place] [facility] [address]   [phone]            Sep 13, 2018  1:50 PM CDT   (Arrive by 1:35 PM)   RETURN ARRHYTHMIA with Kirk Russell MD   [facility] ([facility])    [address]  [address]  [place] [address] [phone]            Sep 21, 2018  2:00 PM CDT   (Arrive by 1:45 PM)   CONSULT with Adalberto Shipley MD   [facility] Rheumatology ([facility])    [address]  [address]  [place] [address]   [phone]            Oct 22, 2018  7:35 AM CDT   (Arrive by 7:20 AM)   New Allergy with Vahe Rodriguez MD   [facility] ([facility])    [address]  [address]  [place] [address] [phone]           Do not take anti-histamines or Zantac for seven day prior to your appointment.            Oct 22, 2018   Procedure with Sadia Carolina MD   [facility] [place] Endoscopy ([facility], [facility])    [address] [address]   [phone]           The [facility] is located on the corner of [address] and [address] on the [address] It is easily accessible from virtually any point [place], via [address].               Future tests that were ordered for you today     Open Future Orders        Priority Expected Expires Ordered    XR Video Swallow w Esophagram Routine 9/13/2018 8/13/2019 8/13/2018            Who to contact     Please call your clinic at [phone] to:    Ask questions about your health    Make or cancel appointments    Discuss your medicines    Learn about your test results    Speak to your doctor            Additional Information About Your Visit        My Health Direct[employer] Information     [employer] gives you secure access to your electronic health record. If you see a primary care provider, you can also send messages to your care team and make appointments. If you have questions, please call your primary care clinic.  If you do not have a primary care provider, please call [phone] and they will assist you.      [employer] is an electronic gateway that provides easy, online access to your medical records. With [employer], you can request a clinic appointment, read your test results, renew a prescription or communicate with your care team.     To access your existing account, please contact your [facility] or call [phone] for assistance.        Care EveryWhere ID     This is your Care EveryWhere ID. This could be used by other organizations to access your [facility] medical records  [id number]         Blood Pressure from Last 3 Encounters:   08/13/18 120/85   08/08/18 105/70   08/08/18 105/70    Weight from Last 3 Encounters:   08/13/18 91.6 kg (202 lb)   08/13/18 91.6 kg (202 lb)   08/08/18 93.5 kg (206 lb 2.1 oz)              We Performed the Following     [facility] BEHAVIORAL & QUALITATIVE ANALYSIS VOICE AND RESONANCE          Today's Medication Changes          These changes are accurate as of 8/13/18 11:59 PM.  If you have any questions, ask your nurse or doctor.               These medicines have changed or have updated prescriptions.        Dose/Directions    omeprazole 40 MG capsule    Commonly known as:  priLOSEC   This may have changed:  Another medication with the same name was removed. Continue taking this medication, and follow the directions you see here.   Used for:  Eosinophilic esophagitis   Changed by:  Seema Eubanks MD        Dose:  40 mg   Take 1 capsule (40 mg) by mouth 2 times daily as needed   Quantity:  60 capsule   Refills:  3            Where to get your medicines      These medications were sent to [place] [address] [facility] [place] - [address]  [address] [facility] [place] [address]     Phone:  [phone]     levothyroxine 25 MCG tablet                Primary Care Provider Office Phone # Fax #    Ellyn Rivera [phone]       [address] [address]        Equal Access to Services     CHRISTINA GUY : Ashutosh romoo Sonegrito, waaxda luqadaha, qaybta kaalmada adeegyada, odette dykes . So [facility] [phone].    ATENCIÓN: Si habla español, tiene a sweeney disposición servicios gratuitos de asistencia lingüística. [facility] [phone].    We comply with applicable federal civil rights laws and [place] laws. We do not discriminate on the basis of race, color, national origin, age, disability, sex, sexual orientation, or gender identity.            Thank you!     Thank you for choosing [facility]  for your care. Our goal is always to provide you with excellent care. Hearing back from our patients is one way we can continue to improve our services. Please take a few minutes to complete the written survey that you may receive in the mail after your visit with us. Thank you!             Your Updated Medication List - Protect others around you: Learn how to safely use, store and throw away your medicines at www.disposemymeds.org.          This list is accurate as of 8/13/18 11:59 PM.  Always use your most recent med list.                   Brand Name Dispense Instructions for use Diagnosis     ACETAMINOPHEN PO      Take 1,000 mg by mouth every 6 hours as needed for pain Take with tramadol        ADVAIR DISKUS 250-50 MCG/DOSE diskus inhaler   Generic drug:  fluticasone-salmeterol      Inhale 1 puff into the lungs 2 times daily as needed        albuterol 108 (90 Base) MCG/ACT inhaler    PROAIR HFA/PROVENTIL HFA/VENTOLIN HFA     Inhale 2 puffs into the lungs every 6 hours as needed        AMITRIPTYLINE HCL PO      Take 40 mg by mouth daily for two weeks, then increase to 50 mg daily.        buprenorphine 10 MCG/HR WK patch    BUTRANS    4 patch    Place 1 patch onto the skin every 7 days    Postlaminectomy syndrome of lumbar region       butalbital-acetaminophen-caffeine -40 MG per tablet    FIORICET/ESGIC     TAKE ONE TABLET BY MOUTH EVERY 4 HOURS AS NEEDED        CEFALY KIT Pauly     1 Device    1 Device daily as needed    Intractable chronic migraine without aura and without status migrainosus       cetirizine 10 MG tablet    zyrTEC     Take 10 mg by mouth daily        COMPRESSION STOCKINGS     3 each    1 each daily 20-30 mmHg Thigh Length measure and fit, color and style per patient preference. Doff n jose per need.    Orthostatic hypotension       DULOXETINE HCL PO      Take 60 mg by mouth in the morning and 30 mg in the evening.        eletriptan 20 MG tablet    RELPAX    18 tablet    Take 1 tablet (20 mg) by mouth at onset of headache for migraine May repeat in 2 hours. Max 4 tablets/24 hours.    Chronic daily headache, Intractable migraine without aura and with status migrainosus       EPINEPHrine 0.3 MG/0.3ML injection 2-pack    EPIPEN/ADRENACLICK/or ANY BX GENERIC EQUIV     Inject 0.3 mg into the muscle        famotidine 20 MG tablet    PEPCID    90 tablet    Take 1 tablet (20 mg) by mouth At Bedtime    Hiatal hernia, Mast cell disorder       fludrocortisone 0.1 MG tablet    FLORINEF    180 tablet    Take 2 tablets (0.2 mg) by mouth daily    Autonomic dysfunction, Pre-syncope        fluticasone 50 MCG/ACT spray    FLONASE    48 mL    INSTILL 2 SPRAYS INTO BOTH NOSTRILS DAILY    Chronic rhinitis, unspecified type       levothyroxine 25 MCG tablet    SYNTHROID    90 tablet    Take 1 tablet (25 mcg) by mouth daily    Hypothyroidism, unspecified type       linaclotide 145 MCG capsule    LINZESS    30 capsule    Take 1 capsule (145 mcg) by mouth every morning (before breakfast) Last refill until GI clinic follow up.    Chronic idiopathic constipation       medical cannabis (Patient's own supply.  Not a prescription)      Take 1 Dose by mouth See Admin Instructions (This is NOT a prescription, and does not certify that the patient has a qualifying medical condition for medical cannabis.  The purpose of this order is  to document that the patient reports taking medical cannabis.)        metoclopramide 5 MG tablet    REGLAN    180 tablet    Take 1-2 tablets (5-10 mg) by mouth every 6 hours as needed    Intractable chronic migraine without aura and without status migrainosus       MULTIVITAMIN PO      Take 1 tablet by mouth daily        OLANZapine 5 MG tablet    zyPREXA    30 tablet    Take 1 tablet (5 mg) by mouth daily as needed (headache pain, nausea)    Other migraine with status migrainosus, intractable, Intractable chronic migraine without aura and without status migrainosus       omeprazole 40 MG capsule    priLOSEC    60 capsule    Take 1 capsule (40 mg) by mouth 2 times daily as needed    Eosinophilic esophagitis       ondansetron 4 MG ODT tab    ZOFRAN-ODT    60 tablet    Take 1-2 tablets (4-8 mg) by mouth every 12 hours as needed for nausea    Migraine without status migrainosus, not intractable, unspecified migraine type       oxyCODONE IR 5 MG tablet    ROXICODONE    15 tablet    Take 0.5 tablets (2.5 mg) by mouth every 6 hours as needed for severe pain    Postlaminectomy syndrome       PROBIOTIC DAILY PO      Take 2 packets by mouth every morning        Propranolol HCl SR Beads 120 MG Cp24      30 capsule    Take 120 mg by mouth daily    Sinus tachycardia       rizatriptan 5 MG ODT tab    MAXALT-MLT     TAKE 1-2 TABLETS (5-10 MG) BY MOUTH AT ONSET OF HEADACHE FOR MIGRAINE (MAX 30 MG IN 24 HOURS)        SUMAtriptan 6 MG/0.5ML pen injector kit    IMITREX STATDOSE    2 kit    Inject 0.5 mLs (6 mg) Subcutaneous at onset of headache for migraine (may repeat once after 2 hrs) May repeat in 1 hour. Max 12 mg/24 hours.    Chronic migraine without aura without status migrainosus, not intractable       tiZANidine 4 MG tablet    ZANAFLEX    120 tablet    Take 1 tablet (4 mg) by mouth 4 times daily as needed for muscle spasms        traMADol 50 MG tablet    ULTRAM    60 tablet    Take 1 tablet (50 mg) by mouth every 6 hours as needed for severe pain (Take scheduled every 6 hours for one week, then resume taking as needed)    Postlaminectomy syndrome       VITAMIN D (CHOLECALCIFEROL) PO      Take 2,000 Units by mouth daily        ZONISAMIDE PO      Take 75 mg by mouth daily

## 2018-08-13 NOTE — PATIENT INSTRUCTIONS
[facility] Medication Refill Request Information:  * Please contact your pharmacy regarding ANY request for medication refills.  ** [facility] Prescription Fax = [phone]  * Please allow 3 business days for routine medication refills.  * Please allow 5 business days for controlled substance medication refills.     [facility] Test Result notification information:  *You will be notified with in 7-10 days of your appointment day regarding the results of your test.  If you are on MyChart you will be notified as soon as the provider has reviewed the results and signed off on them.    [facility]: [phone]       1. Please schedule f/u with Dr. Nguyen in Neuro  2.  cx Oct RTC with Dr. Rivera, and add RTC with Dr. Galicia  3,  Cancel August GI appointment

## 2018-08-13 NOTE — NURSING NOTE
Chief Complaint   Patient presents with     Recheck Medication     following up on new migraine medication     Results     go over results of upper endoscopy   Coreen Varner LPN 3:46 PM on 8/13/2018

## 2018-08-13 NOTE — MR AVS SNAPSHOT
After Visit Summary   8/13/2018    Ada Welch    MRN: [id number]           Patient Information     Date Of Birth          1970        Visit Information        Provider Department      8/13/2018 3:25 PM Shilpa Galicia MD [facility]        Today's Diagnoses     Intractable chronic migraine without aura and with status migrainosus    -  1    Hypothyroidism, unspecified type          Care Instructions    Primary Care Center Medication Refill Request Information:  * Please contact your pharmacy regarding ANY request for medication refills.  ** [facility] Prescription Fax = [phone]  * Please allow 3 business days for routine medication refills.  * Please allow 5 business days for controlled substance medication refills.     Primary Care Center Test Result notification information:  *You will be notified with in 7-10 days of your appointment day regarding the results of your test.  If you are on MyChart you will be notified as soon as the provider has reviewed the results and signed off on them.    [facility]: [phone]       1. Please schedule f/u with Dr. Nguyen in Neuro  2.  cx Oct RTC with Dr. Rivera, and add RTC with Dr. Galicia  3,  Cancel August GI appointment          Follow-ups after your visit        Follow-up notes from your care team     Return in about 3 months (around 11/13/2018).      Your next 10 appointments already scheduled     Aug 15, 2018  2:45 PM CDT   (Arrive by 2:30 PM)   Return Visit with Zoe Barahona MD   [facility] Ear Nose and Throat ([facility] [place])    [address] [place] [address] [id number] [phone]            Aug 22, 2018  3:40 PM CDT   (Arrive by 3:25 PM)   Return Visit with Juana Griffith MD   [facility]ology [facility] ([facility])    [address] [place] [id number] [phone]            Sep 13, 2018  1:50 PM CDT    (Arrive by 1:35 PM)   RETURN ARRHYTHMIA with Kirk Russell MD   [facility] ([facility] [place])    [address]  [address]  [facility] [id number] [phone]            Oct 22, 2018  7:35 AM CDT   (Arrive by 7:20 AM)   New Allergy with Vahe Rodriguez MD   [facility] ([facility] [place])    [address]  [address]  [place] [facility] [address] [phone]           Do not take anti-histamines or Zantac for seven day prior to your appointment.            Oct 22, 2018   Procedure with Sadia Carolina MD   [facility] [place] ([facility])    [address] [place] [id number] [phone]           [facility] is located on the corner of [address] on the [facility]. It is easily accessible from virtually any point in the [place] area, via [address] and [address].            Oct 25, 2018  9:25 AM CDT   (Arrive by 9:10 AM)   Return Visit with Ellyn Rivera MD   [facility] ([facility])    [address]  4th Floor  [place] [facility] [address] [phone]              Future tests that were ordered for you today     Open Future Orders        Priority Expected Expires Ordered    XR Video Swallow w Esophagram Routine 9/13/2018 8/13/2019 8/13/2018            Who to contact     Please call your clinic at [phone] to:    Ask questions about your health    Make or cancel appointments    Discuss your medicines    Learn about your test results    Speak to your doctor            Additional Information About Your Visit        MyChart Information     [employer] gives you secure access to your electronic health record. If you see a primary care provider, you can also send messages to your care team and make appointments. If you have questions, please  call your primary care clinic.  If you do not have a primary care provider, please call [phone] and they will assist you.      [employer] is an electronic gateway that provides easy, online access to your medical records. With [employer], you can request a clinic appointment, read your test results, renew a prescription or communicate with your care team.     To access your existing account, please contact your [facility] or call [phone] for assistance.        Care EveryWhere ID     This is your Care EveryWhere ID. This could be used by other organizations to access your [place] medical records  [id number]        Your Vitals Were     Pulse Respirations Pulse Oximetry BMI (Body Mass Index)          69 20 97% 31.64 kg/m2         Blood Pressure from Last 3 Encounters:   08/13/18 120/85   08/08/18 105/70   08/08/18 105/70    Weight from Last 3 Encounters:   08/13/18 91.6 kg (202 lb)   08/13/18 91.6 kg (202 lb)   08/08/18 93.5 kg (206 lb 2.1 oz)              Today, you had the following     No orders found for display         Today's Medication Changes          These changes are accurate as of 8/13/18  4:13 PM.  If you have any questions, ask your nurse or doctor.               These medicines have changed or have updated prescriptions.        Dose/Directions    omeprazole 40 MG capsule   Commonly known as:  priLOSEC   This may have changed:  Another medication with the same name was removed. Continue taking this medication, and follow the directions you see here.   Used for:  Eosinophilic esophagitis   Changed by:  Seema Eubanks MD        Dose:  40 mg   Take 1 capsule (40 mg) by mouth 2 times daily as needed   Quantity:  60 capsule   Refills:  3            Where to get your medicines      These medications were sent to [place] [address] [place] [facility] [place] - [address]  [address] [place] [address]     Phone:  [phone]     levothyroxine 25 MCG tablet                 Primary Care Provider Office Phone # Fax #    Ellyn Rivera [phone] [address] [address]        Equal Access to Services     CHRISTINA GUY : Ashutosh kia gamboa demetriuso Sonoyali, waaxda luqadaha, qaybta kaalmada adeclement, odette chino laTanyastanley tang. So [facility] [phone].    ATENCIÓN: Si habla español, tiene a sweeney disposición servicios gratuitos de asistencia lingüística. Llame al [phone].    We comply with applicable federal civil rights laws and [place] laws. We do not discriminate on the basis of race, color, national origin, age, disability, sex, sexual orientation, or gender identity.            Thank you!     Thank you for choosing [facility]  for your care. Our goal is always to provide you with excellent care. Hearing back from our patients is one way we can continue to improve our services. Please take a few minutes to complete the written survey that you may receive in the mail after your visit with us. Thank you!             Your Updated Medication List - Protect others around you: Learn how to safely use, store and throw away your medicines at www.disposemymeds.org.          This list is accurate as of 8/13/18  4:13 PM.  Always use your most recent med list.                   Brand Name Dispense Instructions for use Diagnosis    ACETAMINOPHEN PO      Take 1,000 mg by mouth every 6 hours as needed for pain Take with tramadol        ADVAIR DISKUS 250-50 MCG/DOSE diskus inhaler   Generic drug:  fluticasone-salmeterol      Inhale 1 puff into the lungs 2 times daily as needed        albuterol 108 (90 Base) MCG/ACT inhaler    PROAIR HFA/PROVENTIL HFA/VENTOLIN HFA     Inhale 2 puffs into the lungs every 6 hours as needed        AMITRIPTYLINE HCL PO      Take 40 mg by mouth daily for two weeks, then increase to 50 mg daily.        buprenorphine 10 MCG/HR WK patch    BUTRANS    4 patch    Place 1 patch onto the skin  every 7 days    Postlaminectomy syndrome of lumbar region       butalbital-acetaminophen-caffeine -40 MG per tablet    FIORICET/ESGIC     TAKE ONE TABLET BY MOUTH EVERY 4 HOURS AS NEEDED        CEFALY KIT Pauly     1 Device    1 Device daily as needed    Intractable chronic migraine without aura and without status migrainosus       cetirizine 10 MG tablet    zyrTEC     Take 10 mg by mouth daily        COMPRESSION STOCKINGS     3 each    1 each daily 20-30 mmHg Thigh Length measure and fit, color and style per patient preference. Doff n jose per need.    Orthostatic hypotension       DULOXETINE HCL PO      Take 60 mg by mouth in the morning and 30 mg in the evening.        eletriptan 20 MG tablet    RELPAX    18 tablet    Take 1 tablet (20 mg) by mouth at onset of headache for migraine May repeat in 2 hours. Max 4 tablets/24 hours.    Chronic daily headache, Intractable migraine without aura and with status migrainosus       EPINEPHrine 0.3 MG/0.3ML injection 2-pack    EPIPEN/ADRENACLICK/or ANY BX GENERIC EQUIV     Inject 0.3 mg into the muscle        famotidine 20 MG tablet    PEPCID    90 tablet    Take 1 tablet (20 mg) by mouth At Bedtime    Hiatal hernia, Mast cell disorder       fludrocortisone 0.1 MG tablet    FLORINEF    180 tablet    Take 2 tablets (0.2 mg) by mouth daily    Autonomic dysfunction, Pre-syncope       fluticasone 50 MCG/ACT spray    FLONASE    48 mL    INSTILL 2 SPRAYS INTO BOTH NOSTRILS DAILY    Chronic rhinitis, unspecified type       levothyroxine 25 MCG tablet    SYNTHROID    90 tablet    Take 1 tablet (25 mcg) by mouth daily    Hypothyroidism, unspecified type       linaclotide 145 MCG capsule    LINZESS    30 capsule    Take 1 capsule (145 mcg) by mouth every morning (before breakfast) Last refill until GI clinic follow up.    Chronic idiopathic constipation       medical cannabis (Patient's own supply.  Not a prescription)      Take 1 Dose by mouth See Admin Instructions (This is  NOT a prescription, and does not certify that the patient has a qualifying medical condition for medical cannabis.  The purpose of this order is  to document that the patient reports taking medical cannabis.)        metoclopramide 5 MG tablet    REGLAN    180 tablet    Take 1-2 tablets (5-10 mg) by mouth every 6 hours as needed    Intractable chronic migraine without aura and without status migrainosus       MULTIVITAMIN PO      Take 1 tablet by mouth daily        OLANZapine 5 MG tablet    zyPREXA    30 tablet    Take 1 tablet (5 mg) by mouth daily as needed (headache pain, nausea)    Other migraine with status migrainosus, intractable, Intractable chronic migraine without aura and without status migrainosus       omeprazole 40 MG capsule    priLOSEC    60 capsule    Take 1 capsule (40 mg) by mouth 2 times daily as needed    Eosinophilic esophagitis       ondansetron 4 MG ODT tab    ZOFRAN-ODT    60 tablet    Take 1-2 tablets (4-8 mg) by mouth every 12 hours as needed for nausea    Migraine without status migrainosus, not intractable, unspecified migraine type       oxyCODONE IR 5 MG tablet    ROXICODONE    15 tablet    Take 0.5 tablets (2.5 mg) by mouth every 6 hours as needed for severe pain    Postlaminectomy syndrome       PROBIOTIC DAILY PO      Take 2 packets by mouth every morning        Propranolol HCl SR Beads 120 MG Cp24     30 capsule    Take 120 mg by mouth daily    Sinus tachycardia       rizatriptan 5 MG ODT tab    MAXALT-MLT     TAKE 1-2 TABLETS (5-10 MG) BY MOUTH AT ONSET OF HEADACHE FOR MIGRAINE (MAX 30 MG IN 24 HOURS)        SUMAtriptan 6 MG/0.5ML pen injector kit    IMITREX STATDOSE    2 kit    Inject 0.5 mLs (6 mg) Subcutaneous at onset of headache for migraine (may repeat once after 2 hrs) May repeat in 1 hour. Max 12 mg/24 hours.    Chronic migraine without aura without status migrainosus, not intractable       tiZANidine 4 MG tablet    ZANAFLEX    120 tablet    Take 1 tablet (4 mg) by mouth 4  times daily as needed for muscle spasms        traMADol 50 MG tablet    ULTRAM    60 tablet    Take 1 tablet (50 mg) by mouth every 6 hours as needed for severe pain (Take scheduled every 6 hours for one week, then resume taking as needed)    Postlaminectomy syndrome       VITAMIN D (CHOLECALCIFEROL) PO      Take 2,000 Units by mouth daily        ZONISAMIDE PO      Take 75 mg by mouth daily

## 2018-08-14 NOTE — TELEPHONE ENCOUNTER
General Rheumatology Intake Form    Reason for referral: arthritis  Referring provider: Cynthia Leach MD    Past Rheumatologist: Yes  Clinic/Provider name: [facility]    Is this a second opinion or transfer of care? Transfer of care to have all care in one place Are you wanting to establish care here? Yes       Have you been diagnosed with Fibromyalgia? no    Who manages your care for this issue now? none     What is your most urgent concern at this time? Managing disease, degeneration    Have you seen any specialist related to the reason you are coming here? yes    Where are we expecting records (labs, imaging or pathology) from? Care Everywhere for [facility]    Offered an appointment on 9/21/2018 with Dr. Shipley, patient accepted and was instructed to arrive 15 minutes prior to appointment and asked to bring a current medication list. Patient verbalized understanding.  Charlotte Fragoso [facility]  8/14/2018 10:01 AM            Charlotte Fragoso [facility]  8/14/2018 9:48 AM

## 2018-08-15 ENCOUNTER — MYC MEDICAL ADVICE (OUTPATIENT)
Dept: ANESTHESIOLOGY | Facility: CLINIC | Age: 48
End: 2018-08-15

## 2018-08-15 DIAGNOSIS — G43.709 CHRONIC MIGRAINE WITHOUT AURA WITHOUT STATUS MIGRAINOSUS, NOT INTRACTABLE: ICD-10-CM

## 2018-08-15 DIAGNOSIS — M96.1 POSTLAMINECTOMY SYNDROME: ICD-10-CM

## 2018-08-16 ENCOUNTER — MYC REFILL (OUTPATIENT)
Dept: GASTROENTEROLOGY | Facility: CLINIC | Age: 48
End: 2018-08-16

## 2018-08-16 DIAGNOSIS — K59.04 CHRONIC IDIOPATHIC CONSTIPATION: ICD-10-CM

## 2018-08-16 RX ORDER — TRAMADOL HYDROCHLORIDE 50 MG/1
50 TABLET ORAL EVERY 6 HOURS PRN
Qty: 60 TABLET | Refills: 1 | Status: SHIPPED | OUTPATIENT
Start: 2018-08-16 | End: 2018-11-01

## 2018-08-16 RX ORDER — CEFUROXIME AXETIL 250 MG/1
TABLET ORAL
Qty: 2 KIT | Refills: 1 | Status: SHIPPED | OUTPATIENT
Start: 2018-08-16 | End: 2019-10-23

## 2018-08-16 NOTE — TELEPHONE ENCOUNTER
Sumatriptan      Last Written Prescription Date:  7-10-18  Last Fill Quantity: 2,   # refills: 1  Last Office Visit : 8-13-18  Future Office visit:  none    Routing refill request to RN  for review/approval because:  8 treatments in the last month.

## 2018-08-24 ENCOUNTER — MYC MEDICAL ADVICE (OUTPATIENT)
Dept: GASTROENTEROLOGY | Facility: CLINIC | Age: 48
End: 2018-08-24

## 2018-08-24 DIAGNOSIS — K59.04 CHRONIC IDIOPATHIC CONSTIPATION: ICD-10-CM

## 2018-08-24 NOTE — TELEPHONE ENCOUNTER
Message from [employer]:  Original authorizing provider: MD Ada Warren would like a refill of the following medications:  linaclotide (LINZESS) 145 MCG capsule [Juana Griffith MD]    Preferred pharmacy: [facility] [address] [place] [address] [address]    Comment:

## 2018-08-24 NOTE — TELEPHONE ENCOUNTER
Patient medication refilled for two months. Patient will need to call to schedule follow up appointment in clinic.

## 2018-09-06 ENCOUNTER — CARE COORDINATION (OUTPATIENT)
Dept: OTOLARYNGOLOGY | Facility: CLINIC | Age: 48
End: 2018-09-06

## 2018-09-06 NOTE — PROGRESS NOTES
Called and left message for patient to let her know that she is scheduled for US guided biopsy on Tuesday 9/11 at 11:00am located on the 1st floor of [facility]. Patient was advised to return call if this date and time do not work for her. Left direct line for return call.     Daisy Rivera, RN, BSN

## 2018-09-13 ENCOUNTER — MYC REFILL (OUTPATIENT)
Dept: ANESTHESIOLOGY | Facility: CLINIC | Age: 48
End: 2018-09-13

## 2018-09-13 DIAGNOSIS — M96.1 POSTLAMINECTOMY SYNDROME OF LUMBAR REGION: ICD-10-CM

## 2018-09-13 NOTE — TELEPHONE ENCOUNTER
Message from [employer]:  Original authorizing provider: MD Ada Salazar would like a refill of the following medications:  buprenorphine (BUTRANS) 10 MCG/HR WK patch [Faiza Martinez MD]    Preferred pharmacy: [facility] [address] [place] [address] [address]    Comment:

## 2018-09-14 RX ORDER — BUPRENORPHINE 10 UG/H
1 PATCH TRANSDERMAL
Qty: 4 PATCH | Refills: 1 | Status: SHIPPED | OUTPATIENT
Start: 2018-09-14 | End: 2018-11-08

## 2018-09-14 NOTE — TELEPHONE ENCOUNTER
Refill request    Medication: buprenorphine (BUTRANS) 10 MCG/HR WK patch  Place 1 patch onto the skin every 7 days      MNPMP Checked: Yes    buprenorphine (BUTRANS) 10 MCG/HR WK patch - Last refilled 8/14/18 for 4 patches       Refilled: YES, dated to be refilled 30 days since last refill    Last clinic appointment:6/29/18  Next clinic appointment:TBD    Patient requested to:      Sent to pharmacy

## 2018-09-18 ASSESSMENT — ENCOUNTER SYMPTOMS
SEIZURES: 0
PARALYSIS: 0
ARTHRALGIAS: 1
POLYPHAGIA: 0
BLOOD IN STOOL: 0
NUMBNESS: 1
SHORTNESS OF BREATH: 1
EYE PAIN: 0
PALPITATIONS: 1
VOMITING: 0
NIGHT SWEATS: 1
SMELL DISTURBANCE: 0
MUSCLE WEAKNESS: 1
SKIN CHANGES: 1
HEADACHES: 1
SWOLLEN GLANDS: 1
ABDOMINAL PAIN: 1
SLEEP DISTURBANCES DUE TO BREATHING: 1
HEMOPTYSIS: 0
POLYDIPSIA: 1
ORTHOPNEA: 1
DIARRHEA: 1
HALLUCINATIONS: 0
SINUS CONGESTION: 1
MUSCLE CRAMPS: 1
HYPERTENSION: 0
NECK MASS: 1
FEVER: 1
HEARTBURN: 1
TROUBLE SWALLOWING: 1
SPEECH CHANGE: 1
FLANK PAIN: 1
NAIL CHANGES: 1
DOUBLE VISION: 1
WEAKNESS: 1
STIFFNESS: 1
BRUISES/BLEEDS EASILY: 1
INCREASED ENERGY: 1
HYPOTENSION: 1
SYNCOPE: 0
TINGLING: 1
WEIGHT GAIN: 1
COUGH: 1
BLOATING: 1
SORE THROAT: 1
RECTAL PAIN: 0
SPUTUM PRODUCTION: 1
DYSPNEA ON EXERTION: 1
COUGH DISTURBING SLEEP: 0
FATIGUE: 1
WEIGHT LOSS: 0
HEMATURIA: 0
EXERCISE INTOLERANCE: 1
LEG PAIN: 1
LIGHT-HEADEDNESS: 1
EYE REDNESS: 1
NECK PAIN: 1
JOINT SWELLING: 1
WHEEZING: 1
MEMORY LOSS: 1
CHILLS: 1
HOARSE VOICE: 1
JAUNDICE: 0
EYE WATERING: 1
SNORES LOUDLY: 1
CONSTIPATION: 1
ALTERED TEMPERATURE REGULATION: 1
BOWEL INCONTINENCE: 0
DIFFICULTY URINATING: 1
TASTE DISTURBANCE: 1
POOR WOUND HEALING: 1
DECREASED APPETITE: 1
TREMORS: 1
LOSS OF CONSCIOUSNESS: 1
DYSURIA: 0
SINUS PAIN: 1
EYE IRRITATION: 1
POSTURAL DYSPNEA: 1
NAUSEA: 1
BACK PAIN: 1
DIZZINESS: 1
DISTURBANCES IN COORDINATION: 1
MYALGIAS: 1

## 2018-09-20 ENCOUNTER — OFFICE VISIT (OUTPATIENT)
Dept: CARDIOLOGY | Facility: CLINIC | Age: 48
End: 2018-09-20
Attending: INTERNAL MEDICINE
Payer: MEDICARE

## 2018-09-20 VITALS
DIASTOLIC BLOOD PRESSURE: 80 MMHG | WEIGHT: 214 LBS | BODY MASS INDEX: 33.59 KG/M2 | OXYGEN SATURATION: 95 % | HEART RATE: 67 BPM | HEIGHT: 67 IN | SYSTOLIC BLOOD PRESSURE: 120 MMHG

## 2018-09-20 DIAGNOSIS — R00.0 SINUS TACHYCARDIA: ICD-10-CM

## 2018-09-20 DIAGNOSIS — R55 PRE-SYNCOPE: ICD-10-CM

## 2018-09-20 DIAGNOSIS — G90.9 AUTONOMIC DYSFUNCTION: ICD-10-CM

## 2018-09-20 PROCEDURE — G0463 HOSPITAL OUTPT CLINIC VISIT: HCPCS | Mod: ZF

## 2018-09-20 PROCEDURE — 99214 OFFICE O/P EST MOD 30 MIN: CPT | Mod: ZP | Performed by: INTERNAL MEDICINE

## 2018-09-20 ASSESSMENT — PAIN SCALES - GENERAL: PAINLEVEL: SEVERE PAIN (6)

## 2018-09-20 NOTE — MR AVS SNAPSHOT
After Visit Summary   9/20/2018    Ada Welch    MRN: [id number]           Patient Information     Date Of Birth          1970        Visit Information        Provider Department      9/20/2018 3:00 PM Kirk Russell MD [facility]        Today's Diagnoses     Autonomic dysfunction        Pre-syncope        Sinus tachycardia          Care Instructions    You will be scheduled for a follow up visit: 1 year    We encourage you to use My Chart as your primary form of communication if possible. If you need assistance in setting this up, please contact our office or ask at your follow up visit.     If you need a medication refill please contact your pharmacy. Please allow at least 3 business days for your refill to be completed.       Cardiology  Telephone Number    Ginny Bernabe [phone]   Or send a message to your provider via my chart.   For scheduling procedures:    [facility]    Clinic appointments       [phone]   For the Device Clinic (Pacemakers and ICD's)   RN's :   Carla Wade  During business hours: [phone]    After business hours:   [phone]- select option 4 and ask for job code [id number].          As always, Thank you for trusting us with your health care needs!          Follow-ups after your visit        Additional Services     Follow-Up with Electrophysiologist - 1 Year                 Your next 10 appointments already scheduled     Sep 21, 2018  2:00 PM CDT   (Arrive by 1:45 PM)   CONSULT with Adalberto Shipley MD   [facility] Rheumatology ([facility] [place])    [address]  [address]  [place] [address] [phone]            Oct 02, 2018 10:00 AM CDT   US BIOPSY FINE NEEDLE ASPIRATION LYMPH NODE with JORGE LUS3,  IMAGING NURSE, UC PROCEDURAL RAD   [facility] US ([facility] [place])    [address]  1st Floor  [place] [address]    [phone]           How do I prepare for my exam? (Food and drink instructions) No Food and Drink Restrictions.  How do I prepare for my exam? (Other instructions) IF YOUR DOCTOR ALSO PRESCRIBED SEDATION DURING THE EXAM (medicine to help you relax): You will receive separate instructions about driving, eating, and additional tests that may be necessary prior to your exam day.  What should I wear: Wear comfortable clothes.  How long does the exam take: Aspiration (no sedation treatment takes about an hour.  For paracentesis, thoracentesis or sedation plan to spend at least three hours at the hospital.  What should I bring: Bring a list of your medicines, including vitamins, minerals and over-the-counter drugs. It is safest to leave personal items at home.  Do I need a :  No  is needed.  What do I need to tell my doctor: Tell your doctor in advance: * If you are or may be pregnant. * If you are taking Coumadin (or any other blood thinners) 5 days prior to the exam for any special instructions. * If you are diabetic to determine if your insulin needs have to be adjusted for the exam.  What should I do after the exam: Take it easy the rest of the day. You can return to normal activities the next day.  What is this test: This test uses a long, thin needle or tube to remove tissue, fluid, or other cells from your body. Pictures from an ultrasound will guide the needle to the right place. (Ultrasound uses sound waves to create pictures of the body on a video screen. You will not feel the sound waves.)  * A biopsy removes tissue or other cells from the body; we send the tissue or cells to a lab for testing. * Paracentesis removes fluid from the belly (abdomen) to relieve pressure, to test the fluid or both. * Thoracentesis removes fluid from the sac around the lungs to relieve pressure, to test the fluid or both. * Aspiration removes fluid from any part of the body, then the fluid is tested for disease or  infection.  Who should I call with questions: If you have any questions, please call the Imaging Department where you will have your exam. Directions, parking instructions, and other information is available on our website, [email or web address].            Oct 15, 2018  6:45 PM CDT   (Arrive by 6:30 PM)   Return Visit with Zoe Barahona MD   [facility] Nose [facility] ([facility] [place])    [address]  4th Floor  [place] [address] [phone]            Oct 22, 2018  7:35 AM CDT   (Arrive by 7:20 AM)   New Allergy with Vahe Rodriguez MD   [facility] ([facility] [place])    [address]  [address]  [place] [address] [phone]           Do not take anti-histamines or Zantac for seven day prior to your appointment.            Oct 22, 2018   Procedure with Sadia Carolina MD   [facility] [place] Endoscopy ([facility])    [address]  [place] [address] [phone]           The [facility] is located on the corner of [address] and [address] on the [address]. It is easily accessible from virtually any point in the [place] area, via [address] and [address].            Nov 08, 2018  9:00 AM CST   (Arrive by 8:45 AM)   Return Visit with Faiza Martinez MD   [facility] ([facility] [place])    [address]  4th Floor  [place] [address] [phone]              Future tests that were ordered for you today     Open Future Orders        Priority Expected Expires Ordered    Follow-Up with Electrophysiologist - 1 Year Routine 9/20/2019 9/21/2019 9/20/2018            Who to contact     If you have questions or need follow up information about today's clinic visit or your schedule please contact [facility] directly at  "[phone].  Normal or non-critical lab and imaging results will be communicated to you by MyChart, letter or phone within 4 business days after the clinic has received the results. If you do not hear from us within 7 days, please contact the clinic through [employer] or phone. If you have a critical or abnormal lab result, we will notify you by phone as soon as possible.  Submit refill requests through [employer] or call your pharmacy and they will forward the refill request to us. Please allow 3 business days for your refill to be completed.          Additional Information About Your Visit        [employer] Information     [employer] gives you secure access to your electronic health record. If you see a primary care provider, you can also send messages to your care team and make appointments. If you have questions, please call your primary care clinic.  If you do not have a primary care provider, please call [phone] and they will assist you.        Care EveryWhere ID     This is your Care EveryWhere ID. This could be used by other organizations to access your [facility] medical records  [id number]        Your Vitals Were     Pulse Height Pulse Oximetry BMI (Body Mass Index)          67 1.702 m (5' 7\") 95% 33.52 kg/m2         Blood Pressure from Last 3 Encounters:   09/20/18 120/80   08/13/18 120/85   08/08/18 105/70    Weight from Last 3 Encounters:   09/20/18 97.1 kg (214 lb)   08/13/18 91.6 kg (202 lb)   08/13/18 91.6 kg (202 lb)              We Performed the Following     Follow-Up with Electrophysiologist - 3 Months        Primary Care Provider Office Phone # Fax #    Ellyn Rivera [phone]       [address] [address]        Equal Access to Services     CHRISTINA GUY : Ashutosh Slater, herve oakes, odette garcia. So [facility] [phone].    ATENCIÓN: Si habla español, tiene a sweeney disposición " servicios gratuitos de asistencia lingüística. Isaías carr [phone].    We comply with applicable federal civil rights laws and [place] laws. We do not discriminate on the basis of race, color, national origin, age, disability, sex, sexual orientation, or gender identity.            Thank you!     Thank you for choosing [facility]  for your care. Our goal is always to provide you with excellent care. Hearing back from our patients is one way we can continue to improve our services. Please take a few minutes to complete the written survey that you may receive in the mail after your visit with us. Thank you!             Your Updated Medication List - Protect others around you: Learn how to safely use, store and throw away your medicines at www.disposemymeds.org.          This list is accurate as of 9/20/18 11:59 PM.  Always use your most recent med list.                   Brand Name Dispense Instructions for use Diagnosis    ACETAMINOPHEN PO      Take 1,000 mg by mouth every 6 hours as needed for pain Take with tramadol        ADVAIR DISKUS 250-50 MCG/DOSE diskus inhaler   Generic drug:  fluticasone-salmeterol      Inhale 1 puff into the lungs 2 times daily as needed        albuterol 108 (90 Base) MCG/ACT inhaler    PROAIR HFA/PROVENTIL HFA/VENTOLIN HFA     Inhale 2 puffs into the lungs every 6 hours as needed        AMITRIPTYLINE HCL PO      Take 40 mg by mouth daily for two weeks, then increase to 50 mg daily.        buprenorphine 10 MCG/HR WK patch    BUTRANS    4 patch    Place 1 patch onto the skin every 7 days    Postlaminectomy syndrome of lumbar region       butalbital-acetaminophen-caffeine -40 MG per tablet    FIORICET/ESGIC     TAKE ONE TABLET BY MOUTH EVERY 4 HOURS AS NEEDED        CEFALY KIT Pauly     1 Device    1 Device daily as needed    Intractable chronic migraine without aura and without status migrainosus       cetirizine 10 MG tablet    zyrTEC     Take 10 mg by mouth daily         COMPRESSION STOCKINGS     3 each    1 each daily 20-30 mmHg Thigh Length measure and fit, color and style per patient preference. Doff n jose per need.    Orthostatic hypotension       DULOXETINE HCL PO      Take 60 mg by mouth in the morning and 30 mg in the evening.        eletriptan 20 MG tablet    RELPAX    18 tablet    Take 1 tablet (20 mg) by mouth at onset of headache for migraine May repeat in 2 hours. Max 4 tablets/24 hours.    Chronic daily headache, Intractable migraine without aura and with status migrainosus       EPINEPHrine 0.3 MG/0.3ML injection 2-pack    EPIPEN/ADRENACLICK/or ANY BX GENERIC EQUIV     Inject 0.3 mg into the muscle        famotidine 20 MG tablet    PEPCID    90 tablet    Take 1 tablet (20 mg) by mouth At Bedtime    Hiatal hernia, Mast cell disorder       fludrocortisone 0.1 MG tablet    FLORINEF    180 tablet    Take 2 tablets (0.2 mg) by mouth daily    Autonomic dysfunction, Pre-syncope       fluticasone 50 MCG/ACT spray    FLONASE    48 mL    INSTILL 2 SPRAYS INTO BOTH NOSTRILS DAILY    Chronic rhinitis, unspecified type       levothyroxine 25 MCG tablet    SYNTHROID    90 tablet    Take 1 tablet (25 mcg) by mouth daily    Hypothyroidism, unspecified type       linaclotide 145 MCG capsule    LINZESS    30 capsule    Take 1 capsule (145 mcg) by mouth every morning (before breakfast) Last refill until GI clinic follow up.    Chronic idiopathic constipation       medical cannabis (Patient's own supply.  Not a prescription)      Take 1 Dose by mouth See Admin Instructions (This is NOT a prescription, and does not certify that the patient has a qualifying medical condition for medical cannabis.  The purpose of this order is  to document that the patient reports taking medical cannabis.)        metoclopramide 5 MG tablet    REGLAN    180 tablet    Take 1-2 tablets (5-10 mg) by mouth every 6 hours as needed    Intractable chronic migraine without aura and without status migrainosus        MULTIVITAMIN PO      Take 1 tablet by mouth daily        OLANZapine 5 MG tablet    zyPREXA    30 tablet    Take 1 tablet (5 mg) by mouth daily as needed (headache pain, nausea)    Other migraine with status migrainosus, intractable, Intractable chronic migraine without aura and without status migrainosus       omeprazole 40 MG capsule    priLOSEC    60 capsule    Take 1 capsule (40 mg) by mouth 2 times daily as needed    Eosinophilic esophagitis       ondansetron 4 MG ODT tab    ZOFRAN-ODT    60 tablet    Take 1-2 tablets (4-8 mg) by mouth every 12 hours as needed for nausea    Migraine without status migrainosus, not intractable, unspecified migraine type       oxyCODONE IR 5 MG tablet    ROXICODONE    15 tablet    Take 0.5 tablets (2.5 mg) by mouth every 6 hours as needed for severe pain    Postlaminectomy syndrome       PROBIOTIC DAILY PO      Take 2 packets by mouth every morning        Propranolol HCl SR Beads 120 MG Cp24     30 capsule    Take 120 mg by mouth daily    Sinus tachycardia       rizatriptan 5 MG ODT tab    MAXALT-MLT     TAKE 1-2 TABLETS (5-10 MG) BY MOUTH AT ONSET OF HEADACHE FOR MIGRAINE (MAX 30 MG IN 24 HOURS)        SUMAtriptan 6 MG/0.5ML pen injector kit    IMITREX STATDOSE    2 kit    Inject 0.5 ml (6 mg) subcutaneously at onset of migraine, May repeat in 1 hour , Max 12 mg/24 hrs    Chronic migraine without aura without status migrainosus, not intractable       tiZANidine 4 MG tablet    ZANAFLEX    120 tablet    Take 1 tablet (4 mg) by mouth 4 times daily as needed for muscle spasms        traMADol 50 MG tablet    ULTRAM    60 tablet    Take 1 tablet (50 mg) by mouth every 6 hours as needed for severe pain    Postlaminectomy syndrome       VITAMIN D (CHOLECALCIFEROL) PO      Take 2,000 Units by mouth daily        ZONISAMIDE PO      Take 75 mg by mouth daily

## 2018-09-20 NOTE — PATIENT INSTRUCTIONS
You will be scheduled for a follow up visit: 1 year    We encourage you to use My Chart as your primary form of communication if possible. If you need assistance in setting this up, please contact our office or ask at your follow up visit.     If you need a medication refill please contact your pharmacy. Please allow at least 3 business days for your refill to be completed.       Cardiology  Telephone Number    Ginny Bernabe [phone]   Or send a message to your provider via my chart.   For scheduling procedures:    [facility]    Clinic appointments       [phone]   For the Device Clinic (Pacemakers and ICD's)   RN's :   Carla Wade  During business hours: [phone]    After business hours:   [phone]- select option 4 and ask for job code [id number].          As always, Thank you for trusting us with your health care needs!

## 2018-09-20 NOTE — LETTER
"9/20/2018      RE: Ada Welch  [address] [place] [address]       Dear Colleague,    Thank you for the opportunity to participate in the care of your patient, Ada Welch, at the [facility] HEART [facility]. Please see a copy of my visit note below.    HPI: Ms. Scar Welch is a 47 year-old female who presents with her daughter for follow up of autonomic dysfuction.  The patient has a past medical history significant for dysautonomia, Tom-Danlos, Chiari malformation, eosinophilic esophagitis, and gastroparesis. Since 2014, she notes that her heart rates have fluctuated from as low as 40 to as high as 150 bpm and her BP would also fluctuate at the same time without known aggravating factors. Fluctuations of HR happen when she is supine, sitting, standing, walking, etc and symptoms associated with the fluctuations include LH and dizziness. She had two episodes of blacking out for a few seconds with a prodrome of LH, dizziness, nausea, diaphoresis, black spots in vision before the episodes and was noted to be pale by her . She saw Dr. Tello with these complaints and patient had a ILR placed 8/2017. Patient has several signs of autonomic dysfunction including HR elevation with positional changes found with orthostats in clinic 10/5/2017 and HR elevation to the 170's on ILR. However, there are some atypical features such as HR elevation at home in any position (supine or standing). Given poor sensing of ILR, a Cardionet was done 10/5/2017-10/14/2017 and showed sinus rhythm and frequent sinus tachycardia correlating with symptoms of dyspnea and racing heart. A repeat tilt table was done 10/20/2017 and was normal.  She recently failed midodrine as the patient stated that it caused her blood pressures to drop \"very low\" in the mornings after taking it. She was subsequently started on fludrocortisone 0.1 mg tablet daily.      Patient states that " she feels better from the last visit.  She states that she is able to do normal activities of daily living but still has symptoms during exercise.  She goes to the local gym, and has symptoms when she goes into the pool.  She states that she had some symptoms when she was standing and doing her makeup this morning.  She states that her symptoms are very periodic and intermittent, and usually are associated with heavy sweating and diaphoresis.  She states that she overall feels better than her last visit.  Orthostatics today are relatively normal as her systolic blood pressure stayed in the 120s.  Her heart rates as well as stayed between 59-69.  She was symptomatic even while at rest at this visit.    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     Chronic sinusitis 00/2007    Diagnosed with chronic pansinusitis 2016     Chronic tonsillitis 1980    Had tonsils removed 02/1981, rheumatic fever     Tom-Danlos disease      Eosinophilic esophagitis 08/2018     Gastroesophageal reflux disease 3/1/2017    I have large hiatal hernia     Hernia, abdominal 10/2016    Hiatal Hernia     Hiatal hernia 2016    Have adeline hiatel hernia     Hoarseness Unsure    It comes and goes     IBS (irritable bowel syndrome) with constipation     improved on Linzess     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     Migraines 1/1/2007    Unsure of exact date     Nasal polyps 2013    Were revoved 03/2017, benign     Other nervous system complications 1/2014    Autonomic nervous system disorder, neuralgia, neuropathy     Pneumonia Unsure    Have had pneumonia several times     Swelling, mass, or lump in head and neck 08/2016    Lymph  node has been growing for last year     Thyroid disease 01/01/2008       CURRENT MEDICATIONS:  Current Outpatient Prescriptions   Medication Sig Dispense Refill     ACETAMINOPHEN PO Take 1,000 mg by mouth every 6 hours as needed for pain Take with tramadol       albuterol (PROAIR HFA/PROVENTIL HFA/VENTOLIN HFA) 108 (90 BASE) MCG/ACT Inhaler Inhale 2 puffs into the lungs every 6 hours as needed        AMITRIPTYLINE HCL PO Take 40 mg by mouth daily for two weeks, then increase to 50 mg daily.       buprenorphine (BUTRANS) 10 MCG/HR WK patch Place 1 patch onto the skin every 7 days 4 patch 1     butalbital-acetaminophen-caffeine (FIORICET/ESGIC) -40 MG per tablet TAKE ONE TABLET BY MOUTH EVERY 4 HOURS AS NEEDED  0     cetirizine (ZYRTEC) 10 MG tablet Take 10 mg by mouth daily        COMPRESSION STOCKINGS 1 each daily 20-30 mmHg Thigh Length measure and fit, color and style per patient preference. Doff n jose per need. 3 each 3     DULOXETINE HCL PO Take 60 mg by mouth in the morning and 30 mg in the evening.       eletriptan (RELPAX) 20 MG tablet Take 1 tablet (20 mg) by mouth at onset of headache for migraine May repeat in 2 hours. Max 4 tablets/24 hours. 18 tablet 1     EPINEPHrine (EPIPEN/ADRENACLICK/OR ANY BX GENERIC EQUIV) 0.3 MG/0.3ML injection 2-pack Inject 0.3 mg into the muscle       famotidine (PEPCID) 20 MG tablet Take 1 tablet (20 mg) by mouth At Bedtime 90 tablet 1     fluticasone (FLONASE) 50 MCG/ACT spray INSTILL 2 SPRAYS INTO BOTH NOSTRILS DAILY 48 mL 3     fluticasone-salmeterol (ADVAIR DISKUS) 250-50 MCG/DOSE diskus inhaler Inhale 1 puff into the lungs 2 times daily as needed        levothyroxine (SYNTHROID) 25 MCG tablet Take 1 tablet (25 mcg) by mouth daily 90 tablet 3     linaclotide (LINZESS) 145 MCG capsule Take 1 capsule (145 mcg) by mouth every morning (before breakfast) Last refill until GI clinic follow up. 30 capsule 2     medical cannabis (Patient's own supply.  Not a prescription)  Take 1 Dose by mouth See Admin Instructions (This is NOT a prescription, and does not certify that the patient has a qualifying medical condition for medical cannabis.  The purpose of this order is  to document that the patient reports taking medical cannabis.)       metoclopramide (REGLAN) 5 MG tablet Take 1-2 tablets (5-10 mg) by mouth every 6 hours as needed 180 tablet 1     Multiple Vitamins-Minerals (MULTIVITAMIN PO) Take 1 tablet by mouth daily        Nerve Stimulator (CEFALY KIT) KAROLINE 1 Device daily as needed 1 Device 0     OLANZapine (ZYPREXA) 5 MG tablet Take 1 tablet (5 mg) by mouth daily as needed (headache pain, nausea) 30 tablet 3     omeprazole (PRILOSEC) 40 MG capsule Take 1 capsule (40 mg) by mouth 2 times daily as needed 60 capsule 3     ondansetron (ZOFRAN-ODT) 4 MG ODT tab Take 1-2 tablets (4-8 mg) by mouth every 12 hours as needed for nausea 60 tablet 1     oxyCODONE IR (ROXICODONE) 5 MG tablet Take 0.5 tablets (2.5 mg) by mouth every 6 hours as needed for severe pain 15 tablet 0     Probiotic Product (PROBIOTIC DAILY PO) Take 2 packets by mouth every morning        Propranolol HCl SR Beads 120 MG CP24 Take 120 mg by mouth daily 30 capsule 11     rizatriptan (MAXALT-MLT) 5 MG ODT tab TAKE 1-2 TABLETS (5-10 MG) BY MOUTH AT ONSET OF HEADACHE FOR MIGRAINE (MAX 30 MG IN 24 HOURS)  6     SUMAtriptan (IMITREX STATDOSE) 6 MG/0.5ML pen injector kit Inject 0.5 ml (6 mg) subcutaneously at onset of migraine, May repeat in 1 hour , Max 12 mg/24 hrs 2 kit 1     tiZANidine (ZANAFLEX) 4 MG tablet Take 1 tablet (4 mg) by mouth 4 times daily as needed for muscle spasms 120 tablet 3     traMADol (ULTRAM) 50 MG tablet Take 1 tablet (50 mg) by mouth every 6 hours as needed for severe pain 60 tablet 1     VITAMIN D, CHOLECALCIFEROL, PO Take 2,000 Units by mouth daily       ZONISAMIDE PO Take 75 mg by mouth daily       fludrocortisone (FLORINEF) 0.1 MG tablet Take 2 tablets (0.2 mg) by mouth daily (Patient not taking:  Reported on 9/20/2018) 180 tablet 3       PAST SURGICAL HISTORY:  Past Surgical History:   Procedure Laterality Date     ADENOIDECTOMY  1981     AS REPAIR OF NASAL SEPTUM       BACK SURGERY  12/09/2013  10/01/2014    Spinal fusion L4-S1, L5 moving 5/8ths in. Remove screws/rods     BIOPSY  01/01/2008 & 3/2017    mole biopsy, lymph node biopsy     COLONOSCOPY  3/2017    Colonoscopy and GI     ESOPHAGOSCOPY, GASTROSCOPY, DUODENOSCOPY (EGD), COMBINED N/A 8/3/2018    Procedure: COMBINED ESOPHAGOSCOPY, GASTROSCOPY, DUODENOSCOPY (EGD), BIOPSY SINGLE OR MULTIPLE;;  Surgeon: Juan Woodson MD;  Location:  GI     NASAL/SINUS POLYPECTOMY  2017    Polyps were benign     TONSILLECTOMY       TONSILLECTOMY  1981     TUBAL LIGATION         ALLERGIES:     Allergies   Allergen Reactions     Bee Venom Anaphylaxis, Difficulty breathing, Palpitations, Shortness Of Breath and Visual Disturbance     Patient does carry Epi-Pen     No Clinical Screening - See Comments Anaphylaxis     Chicken-Derived Products (Egg) Diarrhea and Nausea     Patient will self monitor  Other reaction(s): Nausea  Patient will self monitor  Other reaction(s): GI intolerance     Compazine [Prochlorperazine] Other (See Comments)     Extreme jittery     Gabapentin      Gluten Meal Other (See Comments)     Pregabalin Other (See Comments)     Topiramate      Wheat Diarrhea     Wheat Bran Nausea     Patient will self monitor  Other reaction(s): Nausea  Patient will self monitor       FAMILY HISTORY:  Family History   Problem Relation Age of Onset     Connective Tissue Disorder Mother      eds     Connective Tissue Disorder Sister      eds     Cancer Father      Spinal tumor grew into spinal cord, went in brain     Migraines Father      Diabetes Maternal Grandmother      Elderly diabetes     HEART DISEASE Maternal Grandmother      Hypertension Maternal Grandmother      Diabetes Paternal Grandmother      Elderly diabetes     Diabetes Paternal Grandfather      Elderly  "diabetes     Asthma Sister      Pediatric Asthma     Migraines Sister      Asthma Son      Pediatric Asthma     Thyroid Disease Sister      ,     Migraines Sister      Migraines Sister      Breast Cancer No family hx of      - Premature coronary artery disease  - Atrial fibrillation  - Sudden cardiac death     SOCIAL HISTORY:  Social History   Substance Use Topics     Smoking status: Former Smoker     Packs/day: 0.20     Years: 10.00     Types: Cigarettes     Start date: 1/1/1991     Quit date: 12/1/2013     Smokeless tobacco: Never Used      Comment: I somked on and off during specified dates.     Alcohol use Yes      Comment: occasional       ROS:   Constitutional: No fever, chills, or sweats. Weight stable.   ENT: No visual disturbance, ear ache, epistaxis, sore throat.   Cardiovascular: As per HPI.   Respiratory: No cough, hemoptysis.    GI: No nausea, vomiting, hematemesis, melena, or hematochezia.   : No hematuria.   Integument: Negative.   Psychiatric: Negative.   Hematologic:  Easy bruising, no easy bleeding.  Neuro: Negative.   Endocrinology: No significant heat or cold intolerance   Musculoskeletal: No myalgia.    Exam:  /80 (BP Location: Right arm, Patient Position: Standing, Cuff Size: Adult Large)  Pulse 67  Ht 1.702 m (5' 7\")  Wt 97.1 kg (214 lb)  SpO2 95%  BMI 33.52 kg/m2  GENERAL APPEARANCE: healthy, alert and no distress  HEENT: no icterus, no xanthelasmas, normal pupil size and reaction, normal palate, mucosa moist, no central cyanosis  NECK: no adenopathy, no asymmetry, masses, or scars, thyroid normal to palpation and no bruits, JVP not elevated  RESPIRATORY: lungs clear to auscultation - no rales, rhonchi or wheezes, no use of accessory muscles, no retractions, respirations are unlabored, normal respiratory rate  CARDIOVASCULAR: regular rhythm, normal S1 with physiologic split S2, no S3 or S4 and no murmur, click or rub, precordium quiet with normal PMI.  ABDOMEN: soft, non tender, " without hepatosplenomegaly, no masses palpable, bowel sounds normal, aorta not enlarged by palpation, no abdominal bruits  EXTREMITIES: peripheral pulses normal, no edema, no bruits  NEURO: alert and oriented to person/place/time, normal speech, gait and affect  VASC: Radial, femoral, dorsalis pedis and posterior tibialis pulses are normal in volumes and symmetric bilaterally. No bruits are heard.  SKIN: no ecchymoses, no rashes    Labs:  CBC RESULTS:   Lab Results   Component Value Date    WBC 5.5 2018    RBC 4.14 2018    HGB 12.1 2018    HCT 35.9 2018    MCV 87 2018    MCH 29.2 2018    MCHC 33.7 2018    RDW 13.1 2018     2018       BMP RESULTS:  Lab Results   Component Value Date     2018    POTASSIUM 3.8 2018    CHLORIDE 109 2018    CO2 23 2018    ANIONGAP 7 2018    GLC 97 2018    BUN 9 2018    CR 0.85 2018    GFRESTIMATED 72 2018    GFRESTBLACK 87 2018    JUAN 7.8 (L) 2018        INR RESULTS:  Lab Results   Component Value Date    INR 0.96 2018    INR 1.01 2017       Procedures:  PULMONARY FUNCTION TESTS:   No flowsheet data found.      ECHOCARDIOGRAM:   Recent Results (from the past 8760 hour(s))   Echocardiogram Complete    Narrative    [id number]  [id number]  [id number]  [id number]^MELLY^YUSRA^GILBERTO           [facility]  Diagnostic and Treamtent-3rd Floor  [address] [place] [id number]     Name: PANCHO HENAO  MRN: [id number]  : 1970  Study Date: 2018 09:41 AM  Age: 47 yrs  Gender: Female  Patient Location: [facility]  Reason For Study: Tachycardia  Ordering Physician: YUSRA PICKARD  Referring Physician: YUSRA PICKARD  Performed By: Madeline Hicks RDCS     BSA: 2.0 m2  Height: 68 in  Weight: 198 lb  HR: 76  BP: 94/63 mmHg  _____________________________________________________________________________  __         Procedure  Echocardiogram with two-dimensional, color and spectral Doppler performed.  _____________________________________________________________________________  __        Interpretation Summary  Left ventricular function, chamber size, wall motion, and wall thickness are  normal.The EF is 60-65%.  Normal right ventricular size and systolic function.  No significant valve disease.  _____________________________________________________________________________  __        Left Ventricle  Left ventricular function, chamber size, wall motion, and wall thickness are  normal.The EF is 60-65%. Left ventricular diastolic function is normal.     Right Ventricle  The right ventricle is normal size. Global right ventricular function is  normal.     Atria  Both atria appear normal.     Mitral Valve  Trace mitral insufficiency is present.        Aortic Valve  Aortic valve is normal in structure and function. The aortic valve is  tricuspid. No aortic regurgitation is present.     Tricuspid Valve  Trace tricuspid insufficiency is present. The peak velocity of the tricuspid  regurgitant jet is not obtainable.     Pulmonic Valve  The pulmonic valve is normal.     Vessels  The aorta root is normal. The thoracic aorta is normal. The inferior vena cava  was normal in size with preserved respiratory variability. Estimated right  atrial pressure is < 5 mmHg.     Pericardium  No pericardial effusion is present.        Compared to Previous Study  Previous study not available for comparison.  _____________________________________________________________________________  __  MMode/2D Measurements & Calculations  IVSd: 0.69 cm     LVIDd: 4.4 cm  LVIDs: 2.3 cm  LVPWd: 0.74 cm  FS: 46.9 %  LV mass(C)d: 93.7 grams  LV mass(C)dI: 46.0 grams/m2  Ao root diam: 3.5 cm  LA dimension: 3.6 cm  asc Aorta Diam: 2.9 cm  Ao Arch Diam (Proximal trans.): 2.3 cm  LA/Ao: 1.0  LVOT diam: 2.2 cm  LVOT area: 3.7 cm2  LA Volume (BP): 47.7 ml  LA Volume  Index (BP): 23.4 ml/m2  RWT: 0.33           Doppler Measurements & Calculations  MV E max donnell: 67.9 cm/sec  MV A max donnell: 45.2 cm/sec  MV E/A: 1.5  MV dec time: 0.14 sec  Ao V2 max: 111.3 cm/sec  Ao max P.0 mmHg  ERNIE(V,D): 2.4 cm2  LV V1 max P.1 mmHg  LV V1 max: 72.1 cm/sec  LV V1 VTI: 15.9 cm  CO(LVOT): 4.2 l/min  CI(LVOT): 2.1 l/min/m2  SV(LVOT): 59.5 ml  SI(LVOT): 29.2 ml/m2  PA V2 max: 73.4 cm/sec  PA max P.2 mmHg  TR max donnell: 171.1 cm/sec  TR max P.7 mmHg  Pulm Sys Donnell: 32.0 cm/sec  Pulm Soriano Donnell: 44.0 cm/sec  Pulm S/D: 0.73  AV Donnell Ratio (DI): 0.65  E/E' av.2  Lateral E/e': 6.4  Medial E/e': 8.1        _____________________________________________________________________________  __        Report approved by: Maru Herrera 2018 12:33 PM            Assessment and Plan:     #1. Autonomic dysfunction: Feels much better, orthostatic numbers look great.  1. Try to drink 50-60 ounces of 50/50 electrolyte fluids/water mix per day.  Examples: Propel or Pedialyte. Gatorade and Powerade are higher in sugar/calories and are okay in moderation or if you are an athlete.   2. Cont Fludrocortisone to 0.2 mg daily: Take daily in conjunction with a high-salt diet and adequate fluid intake. Doses exceeding 0.3 mg daily may predispose patient to unwanted side effects (ie HTN, hypokalemia). Use caution in patients with HF, MI, DM. Taper if wish discontinuation of therapy.   4. Isometric exercises.  These exercises will assist in increasing intravascular pressure.   They also include swimming, rowing, recumbent bike.   5. Try to eat six small meals per day. Try to keep these meals low in carbohydrates and low in gluten.   6. Bike compression shorts or Spanx- use as much as possible, especially when standing for longer periods of time.   7. During hot summer hours, try to stay in cool -air conditioned climates.   8. Her work up for mast cell activation was normal.      #2. Sinus  tachycardia/palpitations: symptomatic with palpitations during sinus tachycardia. Has occasional hypertension later in the day/evening.    1. Propranolol XL 80 mg once daily       FOLLOW UP:  Follow up in 1 year with Dr. Russell or follow up sooner as needed for symptoms     Patient expresses understanding and agreement with the plan.    Thank you for allowing me to care for this patient. This patient was seen and discussed with Dr. Kirk Russell, who agrees with the plan above. If you have any questions, please do not hesitate to contact me.    Thank you,    Vicky Escamilla  Cardiology Fellow, PGY-5    I very much appreciated the opportunity to see and assess Ms Ada Welch in the clinic with CV Fellow  Andi.     I agree with the note above which summarizes my findings and current recommendations    Please do not hesitate to contact my office if you have any questions or concerns.      Kirk Russell MD  Cardiac Arrhythmia Service  [facility]  [phone]     CC  YUSRA PICKARD

## 2018-09-20 NOTE — PROGRESS NOTES
"HPI: Ms. Scar Welch is a 47 year-old female who presents with her daughter for follow up of autonomic dysfuction.  The patient has a past medical history significant for dysautonomia, Tom-Danlos, Chiari malformation, eosinophilic esophagitis, and gastroparesis. Since 2014, she notes that her heart rates have fluctuated from as low as 40 to as high as 150 bpm and her BP would also fluctuate at the same time without known aggravating factors. Fluctuations of HR happen when she is supine, sitting, standing, walking, etc and symptoms associated with the fluctuations include LH and dizziness. She had two episodes of blacking out for a few seconds with a prodrome of LH, dizziness, nausea, diaphoresis, black spots in vision before the episodes and was noted to be pale by her . She saw Dr. Tello with these complaints and patient had a ILR placed 8/2017. Patient has several signs of autonomic dysfunction including HR elevation with positional changes found with orthostats in clinic 10/5/2017 and HR elevation to the 170's on ILR. However, there are some atypical features such as HR elevation at home in any position (supine or standing). Given poor sensing of ILR, a Cardionet was done 10/5/2017-10/14/2017 and showed sinus rhythm and frequent sinus tachycardia correlating with symptoms of dyspnea and racing heart. A repeat tilt table was done 10/20/2017 and was normal.  She recently failed midodrine as the patient stated that it caused her blood pressures to drop \"very low\" in the mornings after taking it. She was subsequently started on fludrocortisone 0.1 mg tablet daily.      Patient states that she feels better from the last visit.  She states that she is able to do normal activities of daily living but still has symptoms during exercise.  She goes to the local gym, and has symptoms when she goes into the pool.  She states that she had some symptoms when she was standing and doing her makeup this morning.  She " states that her symptoms are very periodic and intermittent, and usually are associated with heavy sweating and diaphoresis.  She states that she overall feels better than her last visit.  Orthostatics today are relatively normal as her systolic blood pressure stayed in the 120s.  Her heart rates as well as stayed between 59-69.  She was symptomatic even while at rest at this visit.    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     Chronic sinusitis 00/2007    Diagnosed with chronic pansinusitis 2016     Chronic tonsillitis 1980    Had tonsils removed 02/1981, rheumatic fever     Tom-Danlos disease      Eosinophilic esophagitis 08/2018     Gastroesophageal reflux disease 3/1/2017    I have large hiatal hernia     Hernia, abdominal 10/2016    Hiatal Hernia     Hiatal hernia 2016    Have adeline hiatel hernia     Hoarseness Unsure    It comes and goes     IBS (irritable bowel syndrome) with constipation     improved on Linzess     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     Migraines 1/1/2007    Unsure of exact date     Nasal polyps 2013    Were revoved 03/2017, benign     Other nervous system complications 1/2014    Autonomic nervous system disorder, neuralgia, neuropathy     Pneumonia Unsure    Have had pneumonia several times     Swelling, mass, or lump in head and neck 08/2016    Lymph node has been growing for last year     Thyroid disease 01/01/2008       CURRENT MEDICATIONS:  Current Outpatient Prescriptions   Medication Sig Dispense Refill     ACETAMINOPHEN PO Take 1,000 mg by mouth every 6 hours as needed for pain Take with tramadol       albuterol (PROAIR HFA/PROVENTIL HFA/VENTOLIN HFA) 108 (90  BASE) MCG/ACT Inhaler Inhale 2 puffs into the lungs every 6 hours as needed        AMITRIPTYLINE HCL PO Take 40 mg by mouth daily for two weeks, then increase to 50 mg daily.       buprenorphine (BUTRANS) 10 MCG/HR WK patch Place 1 patch onto the skin every 7 days 4 patch 1     butalbital-acetaminophen-caffeine (FIORICET/ESGIC) -40 MG per tablet TAKE ONE TABLET BY MOUTH EVERY 4 HOURS AS NEEDED  0     cetirizine (ZYRTEC) 10 MG tablet Take 10 mg by mouth daily        COMPRESSION STOCKINGS 1 each daily 20-30 mmHg Thigh Length measure and fit, color and style per patient preference. Doff n jose per need. 3 each 3     DULOXETINE HCL PO Take 60 mg by mouth in the morning and 30 mg in the evening.       eletriptan (RELPAX) 20 MG tablet Take 1 tablet (20 mg) by mouth at onset of headache for migraine May repeat in 2 hours. Max 4 tablets/24 hours. 18 tablet 1     EPINEPHrine (EPIPEN/ADRENACLICK/OR ANY BX GENERIC EQUIV) 0.3 MG/0.3ML injection 2-pack Inject 0.3 mg into the muscle       famotidine (PEPCID) 20 MG tablet Take 1 tablet (20 mg) by mouth At Bedtime 90 tablet 1     fluticasone (FLONASE) 50 MCG/ACT spray INSTILL 2 SPRAYS INTO BOTH NOSTRILS DAILY 48 mL 3     fluticasone-salmeterol (ADVAIR DISKUS) 250-50 MCG/DOSE diskus inhaler Inhale 1 puff into the lungs 2 times daily as needed        levothyroxine (SYNTHROID) 25 MCG tablet Take 1 tablet (25 mcg) by mouth daily 90 tablet 3     linaclotide (LINZESS) 145 MCG capsule Take 1 capsule (145 mcg) by mouth every morning (before breakfast) Last refill until GI clinic follow up. 30 capsule 2     medical cannabis (Patient's own supply.  Not a prescription) Take 1 Dose by mouth See Admin Instructions (This is NOT a prescription, and does not certify that the patient has a qualifying medical condition for medical cannabis.  The purpose of this order is  to document that the patient reports taking medical cannabis.)       metoclopramide (REGLAN) 5 MG tablet Take 1-2 tablets  (5-10 mg) by mouth every 6 hours as needed 180 tablet 1     Multiple Vitamins-Minerals (MULTIVITAMIN PO) Take 1 tablet by mouth daily        Nerve Stimulator (CEFALY KIT) KAROLINE 1 Device daily as needed 1 Device 0     OLANZapine (ZYPREXA) 5 MG tablet Take 1 tablet (5 mg) by mouth daily as needed (headache pain, nausea) 30 tablet 3     omeprazole (PRILOSEC) 40 MG capsule Take 1 capsule (40 mg) by mouth 2 times daily as needed 60 capsule 3     ondansetron (ZOFRAN-ODT) 4 MG ODT tab Take 1-2 tablets (4-8 mg) by mouth every 12 hours as needed for nausea 60 tablet 1     oxyCODONE IR (ROXICODONE) 5 MG tablet Take 0.5 tablets (2.5 mg) by mouth every 6 hours as needed for severe pain 15 tablet 0     Probiotic Product (PROBIOTIC DAILY PO) Take 2 packets by mouth every morning        Propranolol HCl SR Beads 120 MG CP24 Take 120 mg by mouth daily 30 capsule 11     rizatriptan (MAXALT-MLT) 5 MG ODT tab TAKE 1-2 TABLETS (5-10 MG) BY MOUTH AT ONSET OF HEADACHE FOR MIGRAINE (MAX 30 MG IN 24 HOURS)  6     SUMAtriptan (IMITREX STATDOSE) 6 MG/0.5ML pen injector kit Inject 0.5 ml (6 mg) subcutaneously at onset of migraine, May repeat in 1 hour , Max 12 mg/24 hrs 2 kit 1     tiZANidine (ZANAFLEX) 4 MG tablet Take 1 tablet (4 mg) by mouth 4 times daily as needed for muscle spasms 120 tablet 3     traMADol (ULTRAM) 50 MG tablet Take 1 tablet (50 mg) by mouth every 6 hours as needed for severe pain 60 tablet 1     VITAMIN D, CHOLECALCIFEROL, PO Take 2,000 Units by mouth daily       ZONISAMIDE PO Take 75 mg by mouth daily       fludrocortisone (FLORINEF) 0.1 MG tablet Take 2 tablets (0.2 mg) by mouth daily (Patient not taking: Reported on 9/20/2018) 180 tablet 3       PAST SURGICAL HISTORY:  Past Surgical History:   Procedure Laterality Date     ADENOIDECTOMY  1981     AS REPAIR OF NASAL SEPTUM       BACK SURGERY  12/09/2013  10/01/2014    Spinal fusion L4-S1, L5 moving 5/8ths in. Remove screws/rods     BIOPSY  01/01/2008 & 3/2017    mole  biopsy, lymph node biopsy     COLONOSCOPY  3/2017    Colonoscopy and GI     ESOPHAGOSCOPY, GASTROSCOPY, DUODENOSCOPY (EGD), COMBINED N/A 8/3/2018    Procedure: COMBINED ESOPHAGOSCOPY, GASTROSCOPY, DUODENOSCOPY (EGD), BIOPSY SINGLE OR MULTIPLE;;  Surgeon: Juan Woodson MD;  Location:  GI     NASAL/SINUS POLYPECTOMY  2017    Polyps were benign     TONSILLECTOMY       TONSILLECTOMY  1981     TUBAL LIGATION         ALLERGIES:     Allergies   Allergen Reactions     Bee Venom Anaphylaxis, Difficulty breathing, Palpitations, Shortness Of Breath and Visual Disturbance     Patient does carry Epi-Pen     No Clinical Screening - See Comments Anaphylaxis     Chicken-Derived Products (Egg) Diarrhea and Nausea     Patient will self monitor  Other reaction(s): Nausea  Patient will self monitor  Other reaction(s): GI intolerance     Compazine [Prochlorperazine] Other (See Comments)     Extreme jittery     Gabapentin      Gluten Meal Other (See Comments)     Pregabalin Other (See Comments)     Topiramate      Wheat Diarrhea     Wheat Bran Nausea     Patient will self monitor  Other reaction(s): Nausea  Patient will self monitor       FAMILY HISTORY:  Family History   Problem Relation Age of Onset     Connective Tissue Disorder Mother      eds     Connective Tissue Disorder Sister      eds     Cancer Father      Spinal tumor grew into spinal cord, went in brain     Migraines Father      Diabetes Maternal Grandmother      Elderly diabetes     HEART DISEASE Maternal Grandmother      Hypertension Maternal Grandmother      Diabetes Paternal Grandmother      Elderly diabetes     Diabetes Paternal Grandfather      Elderly diabetes     Asthma Sister      Pediatric Asthma     Migraines Sister      Asthma Son      Pediatric Asthma     Thyroid Disease Sister      ,     Migraines Sister      Migraines Sister      Breast Cancer No family hx of      - Premature coronary artery disease  - Atrial fibrillation  - Sudden cardiac death     SOCIAL  "HISTORY:  Social History   Substance Use Topics     Smoking status: Former Smoker     Packs/day: 0.20     Years: 10.00     Types: Cigarettes     Start date: 1/1/1991     Quit date: 12/1/2013     Smokeless tobacco: Never Used      Comment: I somked on and off during specified dates.     Alcohol use Yes      Comment: occasional       ROS:   Constitutional: No fever, chills, or sweats. Weight stable.   ENT: No visual disturbance, ear ache, epistaxis, sore throat.   Cardiovascular: As per HPI.   Respiratory: No cough, hemoptysis.    GI: No nausea, vomiting, hematemesis, melena, or hematochezia.   : No hematuria.   Integument: Negative.   Psychiatric: Negative.   Hematologic:  Easy bruising, no easy bleeding.  Neuro: Negative.   Endocrinology: No significant heat or cold intolerance   Musculoskeletal: No myalgia.    Exam:  /80 (BP Location: Right arm, Patient Position: Standing, Cuff Size: Adult Large)  Pulse 67  Ht 1.702 m (5' 7\")  Wt 97.1 kg (214 lb)  SpO2 95%  BMI 33.52 kg/m2  GENERAL APPEARANCE: healthy, alert and no distress  HEENT: no icterus, no xanthelasmas, normal pupil size and reaction, normal palate, mucosa moist, no central cyanosis  NECK: no adenopathy, no asymmetry, masses, or scars, thyroid normal to palpation and no bruits, JVP not elevated  RESPIRATORY: lungs clear to auscultation - no rales, rhonchi or wheezes, no use of accessory muscles, no retractions, respirations are unlabored, normal respiratory rate  CARDIOVASCULAR: regular rhythm, normal S1 with physiologic split S2, no S3 or S4 and no murmur, click or rub, precordium quiet with normal PMI.  ABDOMEN: soft, non tender, without hepatosplenomegaly, no masses palpable, bowel sounds normal, aorta not enlarged by palpation, no abdominal bruits  EXTREMITIES: peripheral pulses normal, no edema, no bruits  NEURO: alert and oriented to person/place/time, normal speech, gait and affect  VASC: Radial, femoral, dorsalis pedis and posterior " tibialis pulses are normal in volumes and symmetric bilaterally. No bruits are heard.  SKIN: no ecchymoses, no rashes    Labs:  CBC RESULTS:   Lab Results   Component Value Date    WBC 5.5 2018    RBC 4.14 2018    HGB 12.1 2018    HCT 35.9 2018    MCV 87 2018    MCH 29.2 2018    MCHC 33.7 2018    RDW 13.1 2018     2018       BMP RESULTS:  Lab Results   Component Value Date     2018    POTASSIUM 3.8 2018    CHLORIDE 109 2018    CO2 23 2018    ANIONGAP 7 2018    GLC 97 2018    BUN 9 2018    CR 0.85 2018    GFRESTIMATED 72 2018    GFRESTBLACK 87 2018    JUAN 7.8 (L) 2018        INR RESULTS:  Lab Results   Component Value Date    INR 0.96 2018    INR 1.01 2017       Procedures:  PULMONARY FUNCTION TESTS:   No flowsheet data found.      ECHOCARDIOGRAM:   Recent Results (from the past 8760 hour(s))   Echocardiogram Complete    Narrative    [id number]  [id number]  [id number]  [id number]^MELLY^YUSRA^GILBERTO           [facility]  Diagnostic and [facility] Floor  [address] [place] [id number]     Name: PANCHO HENAO  MRN: [id number]  : 1970  Study Date: 2018 09:41 AM  Age: 47 yrs  Gender: Female  [place] [facility] [place] For Study: Tachycardia  Ordering Physician: YUSRA PICKARD  Referring Physician: YUSRA PICKARD  Performed By: Madeline Hicks RDCS     BSA: 2.0 m2  Height: 68 in  Weight: 198 lb  HR: 76  BP: 94/63 mmHg  _____________________________________________________________________________  __        Procedure  Echocardiogram with two-dimensional, color and spectral Doppler performed.  _____________________________________________________________________________  __        Interpretation Summary  Left ventricular function, chamber size, wall motion, and wall thickness are  normal.The EF is 60-65%.  Normal  right ventricular size and systolic function.  No significant valve disease.  _____________________________________________________________________________  __        Left Ventricle  Left ventricular function, chamber size, wall motion, and wall thickness are  normal.The EF is 60-65%. Left ventricular diastolic function is normal.     Right Ventricle  The right ventricle is normal size. Global right ventricular function is  normal.     Atria  Both atria appear normal.     Mitral Valve  Trace mitral insufficiency is present.        Aortic Valve  Aortic valve is normal in structure and function. The aortic valve is  tricuspid. No aortic regurgitation is present.     Tricuspid Valve  Trace tricuspid insufficiency is present. The peak velocity of the tricuspid  regurgitant jet is not obtainable.     Pulmonic Valve  The pulmonic valve is normal.     Vessels  The aorta root is normal. The thoracic aorta is normal. The inferior vena cava  was normal in size with preserved respiratory variability. Estimated right  atrial pressure is < 5 mmHg.     Pericardium  No pericardial effusion is present.        Compared to Previous Study  Previous study not available for comparison.  _____________________________________________________________________________  __  MMode/2D Measurements & Calculations  IVSd: 0.69 cm     LVIDd: 4.4 cm  LVIDs: 2.3 cm  LVPWd: 0.74 cm  FS: 46.9 %  LV mass(C)d: 93.7 grams  LV mass(C)dI: 46.0 grams/m2  Ao root diam: 3.5 cm  LA dimension: 3.6 cm  asc Aorta Diam: 2.9 cm  Ao Arch Diam (Proximal trans.): 2.3 cm  LA/Ao: 1.0  LVOT diam: 2.2 cm  LVOT area: 3.7 cm2  LA Volume (BP): 47.7 ml  LA Volume Index (BP): 23.4 ml/m2  RWT: 0.33           Doppler Measurements & Calculations  MV E max britni: 67.9 cm/sec  MV A max britni: 45.2 cm/sec  MV E/A: 1.5  MV dec time: 0.14 sec  Ao V2 max: 111.3 cm/sec  Ao max P.0 mmHg  ERNIE(V,D): 2.4 cm2  LV V1 max P.1 mmHg  LV V1 max: 72.1 cm/sec  LV V1 VTI: 15.9 cm  CO(LVOT): 4.2  l/min  CI(LVOT): 2.1 l/min/m2  SV(LVOT): 59.5 ml  SI(LVOT): 29.2 ml/m2  PA V2 max: 73.4 cm/sec  PA max P.2 mmHg  TR max donnell: 171.1 cm/sec  TR max P.7 mmHg  Pulm Sys Donnell: 32.0 cm/sec  Pulm Soriano Donnell: 44.0 cm/sec  Pulm S/D: 0.73  AV Donnell Ratio (DI): 0.65  E/E' av.2  Lateral E/e': 6.4  Medial E/e': 8.1        _____________________________________________________________________________  __        Report approved by: Maru Herrera 2018 12:33 PM            Assessment and Plan:     #1. Autonomic dysfunction: Feels much better, orthostatic numbers look great.  1. Try to drink 50-60 ounces of 50/50 electrolyte fluids/water mix per day.  Examples: Propel or Pedialyte. Gatorade and Powerade are higher in sugar/calories and are okay in moderation or if you are an athlete.   2. Cont Fludrocortisone to 0.2 mg daily: Take daily in conjunction with a high-salt diet and adequate fluid intake. Doses exceeding 0.3 mg daily may predispose patient to unwanted side effects (ie HTN, hypokalemia). Use caution in patients with HF, MI, DM. Taper if wish discontinuation of therapy.   4. Isometric exercises.  These exercises will assist in increasing intravascular pressure.   They also include swimming, rowing, recumbent bike.   5. Try to eat six small meals per day. Try to keep these meals low in carbohydrates and low in gluten.   6. Bike compression shorts or Spanx- use as much as possible, especially when standing for longer periods of time.   7. During hot summer hours, try to stay in cool -air conditioned climates.   8. Her work up for mast cell activation was normal.      #2. Sinus tachycardia/palpitations: symptomatic with palpitations during sinus tachycardia. Has occasional hypertension later in the day/evening.    1. Propranolol XL 80 mg once daily       FOLLOW UP:  Follow up in 1 year with Dr. Russell or follow up sooner as needed for symptoms     Patient expresses understanding and agreement with the  plan.    Thank you for allowing me to care for this patient. This patient was seen and discussed with Dr. Kirk Russell, who agrees with the plan above. If you have any questions, please do not hesitate to contact me.    Thank you,    Vicky Escamilla  Cardiology Fellow, PGY-5    I very much appreciated the opportunity to see and assess Ms Ada Welch in the clinic with CV Fellow  Andi.     I agree with the note above which summarizes my findings and current recommendations    Please do not hesitate to contact my office if you have any questions or concerns.      Kirk Russell MD  Cardiac Arrhythmia Service  [facility]  [phone]     [facility], YUSRA MACIAS  Answers for HPI/ROS submitted by the patient on 9/18/2018   General Symptoms: Yes  Skin Symptoms: Yes  HENT Symptoms: Yes  EYE SYMPTOMS: Yes  HEART SYMPTOMS: Yes  LUNG SYMPTOMS: Yes  INTESTINAL SYMPTOMS: Yes  URINARY SYMPTOMS: Yes  GYNECOLOGIC SYMPTOMS: No  BREAST SYMPTOMS: No  SKELETAL SYMPTOMS: Yes  BLOOD SYMPTOMS: Yes  NERVOUS SYSTEM SYMPTOMS: Yes  MENTAL HEALTH SYMPTOMS: No  Fever: Yes  Loss of appetite: Yes  Weight loss: No  Weight gain: Yes  Fatigue: Yes  Night sweats: Yes  Chills: Yes  Increased stress: No  Excessive hunger: No  Excessive thirst: Yes  Feeling hot or cold when others believe the temperature is normal: Yes  Loss of height: No  Post-operative complications: Yes  Surgical site pain: Yes  Hallucinations: No  Change in or Loss of Energy: Yes  Hyperactivity: No  Confusion: Yes  Changes in hair: Yes  Changes in moles/birth marks: Yes  Itching: Yes  Rashes: Yes  Changes in nails: Yes  Acne: No  Hair in places you don't want it: No  Change in facial hair: No  Warts: No  Non-healing sores: Yes  Scarring: No  Flaking of skin: Yes  Color changes of hands/feet in cold : Yes  Sun sensitivity: No  Skin thickening: No  Ear pain: Yes  Ear discharge: No  Hearing loss: Yes  Tinnitus: Yes  Nosebleeds: No  Congestion: Yes  Sinus pain:  Yes  Trouble swallowing: Yes   Voice hoarseness: Yes  Mouth sores: No  Sore throat: Yes  Tooth pain: No  Gum tenderness: No  Bleeding gums: No  Change in taste: Yes  Change in sense of smell: No  Dry mouth: Yes  Hearing aid used: No  Neck lump: Yes  Eye pain: No  Vision loss: Yes  Dry eyes: Yes  Watery eyes: Yes  Eye bulging: No  Double vision: Yes  Flashing of lights: Yes  Spots: Yes  Floaters: Yes  Redness: Yes  Crossed eyes: No  Tunnel Vision: Yes  Yellowing of eyes: No  Eye irritation: Yes  Cough: Yes  Sputum or phlegm: Yes  Coughing up blood: No  Difficulty breating or shortness of breath: Yes  Snoring: Yes  Wheezing: Yes  Difficulty breathing on exertion: Yes  Nighttime Cough: No  Difficulty breathing when lying flat: Yes  Chest pain or pressure: Yes  Fast or irregular heartbeat: Yes  Pain in legs with walking: Yes  Trouble breathing while lying down: Yes  Fingers or toes appear blue: Yes  High blood pressure: No  Low blood pressure: Yes  Fainting: No  Murmurs: No  Pacemaker: No  Varicose veins: No  Edema or swelling: Yes  Wake up at night with shortness of breath: Yes  Light-headedness: Yes  Exercise intolerance: Yes  Heart burn or indigestion: Yes  Nausea: Yes  Vomiting: No  Abdominal pain: Yes  Bloating: Yes  Constipation: Yes  Diarrhea: Yes  Blood in stool: No  Black stools: No  Rectal or Anal pain: No  Fecal incontinence: No  Yellowing of skin or eyes: No  Vomit with blood: No  Change in stools: Yes  Trouble holding urine or incontinence: Yes  Pain or burning: No  Trouble starting or stopping: Yes  Increased frequency of urination: Yes  Blood in urine: No  Decreased frequency of urination: No  Frequent nighttime urination: Yes  Flank pain: Yes  Difficulty emptying bladder: Yes  Back pain: Yes  Muscle aches: Yes  Neck pain: Yes  Swollen joints: Yes  Joint pain: Yes  Bone pain: Yes  Muscle cramps: Yes  Muscle weakness: Yes  Joint stiffness: Yes  Bone fracture: No  Anemia: No  Swollen glands: Yes  Easy  bleeding or bruising: Yes  Trouble with coordination: Yes  Dizziness or trouble with balance: Yes  Fainting or black-out spells: Yes  Memory loss: Yes  Headache: Yes  Seizures: No  Speech problems: Yes  Tingling: Yes  Tremor: Yes  Weakness: Yes  Difficulty walking: Yes  Paralysis: No  Numbness: Yes

## 2018-09-20 NOTE — NURSING NOTE
Chief Complaint   Patient presents with     Follow Up For      [facility]- annual Follow-up     Medications reviewed and vitals and ortho BP performed.   Abida Metz CMA

## 2018-10-04 ENCOUNTER — RADIANT APPOINTMENT (OUTPATIENT)
Dept: ULTRASOUND IMAGING | Facility: CLINIC | Age: 48
End: 2018-10-04
Attending: OTOLARYNGOLOGY
Payer: COMMERCIAL

## 2018-10-04 DIAGNOSIS — R59.1 LYMPHADENOPATHY: ICD-10-CM

## 2018-10-04 PROCEDURE — 88173 CYTOPATH EVAL FNA REPORT: CPT | Performed by: OTOLARYNGOLOGY

## 2018-10-04 PROCEDURE — 88172 CYTP DX EVAL FNA 1ST EA SITE: CPT | Performed by: OTOLARYNGOLOGY

## 2018-10-04 NOTE — MR AVS SNAPSHOT
MRN:[id number]                      After Visit Summary   10/4/2018    Ada Welch    MRN: [id number]           Visit Information        Provider Department      10/4/2018 10:00 AM  PROCEDURAL RAD;  IMAGING NURSE; [address] [place] [address] [facility]           Review of your medicines          These changes are accurate as of 10/4/18 10:25 AM.  If you have any questions, ask your nurse or doctor.               CONTINUE these medicines which have NOT CHANGED        Dose / Directions    ACETAMINOPHEN PO        Dose:  1000 mg   Take 1,000 mg by mouth every 6 hours as needed for pain Take with tramadol   Refills:  0       ADVAIR DISKUS 250-50 MCG/DOSE diskus inhaler   Generic drug:  fluticasone-salmeterol        Dose:  1 puff   Inhale 1 puff into the lungs 2 times daily as needed   Refills:  0       albuterol 108 (90 Base) MCG/ACT inhaler   Commonly known as:  PROAIR HFA/PROVENTIL HFA/VENTOLIN HFA        Dose:  2 puff   Inhale 2 puffs into the lungs every 6 hours as needed   Refills:  0       AMITRIPTYLINE HCL PO        Take 40 mg by mouth daily for two weeks, then increase to 50 mg daily.   Refills:  0       buprenorphine 10 MCG/HR WK patch   Commonly known as:  BUTRANS   Used for:  Postlaminectomy syndrome of lumbar region        Dose:  1 patch   Place 1 patch onto the skin every 7 days   Quantity:  4 patch   Refills:  1       butalbital-acetaminophen-caffeine -40 MG per tablet   Commonly known as:  FIORICET/ESGIC        TAKE ONE TABLET BY MOUTH EVERY 4 HOURS AS NEEDED   Refills:  0       CEFALY KIT Pauly   Used for:  Intractable chronic migraine without aura and without status migrainosus        Dose:  1 Device   1 Device daily as needed   Quantity:  1 Device   Refills:  0       cetirizine 10 MG tablet   Commonly known as:  zyrTEC        Dose:  10 mg   Take 10 mg by mouth daily   Refills:  0       COMPRESSION STOCKINGS   Used for:  Orthostatic hypotension        Dose:  1 each   1 each  daily 20-30 mmHg Thigh Length measure and fit, color and style per patient preference. Antolin garcia per need.   Quantity:  3 each   Refills:  3       DULOXETINE HCL PO        Take 60 mg by mouth in the morning and 30 mg in the evening.   Refills:  0       eletriptan 20 MG tablet   Commonly known as:  RELPAX   Used for:  Chronic daily headache, Intractable migraine without aura and with status migrainosus        Dose:  20 mg   Take 1 tablet (20 mg) by mouth at onset of headache for migraine May repeat in 2 hours. Max 4 tablets/24 hours.   Quantity:  18 tablet   Refills:  1       EPINEPHrine 0.3 MG/0.3ML injection 2-pack   Commonly known as:  EPIPEN/ADRENACLICK/or ANY BX GENERIC EQUIV        Dose:  0.3 mg   Inject 0.3 mg into the muscle   Refills:  0       famotidine 20 MG tablet   Commonly known as:  PEPCID   Used for:  Hiatal hernia, Mast cell disorder        Dose:  20 mg   Take 1 tablet (20 mg) by mouth At Bedtime   Quantity:  90 tablet   Refills:  1       fludrocortisone 0.1 MG tablet   Commonly known as:  FLORINEF   Used for:  Autonomic dysfunction, Pre-syncope        Dose:  0.2 mg   Take 2 tablets (0.2 mg) by mouth daily   Quantity:  180 tablet   Refills:  3       fluticasone 50 MCG/ACT spray   Commonly known as:  FLONASE   Used for:  Chronic rhinitis, unspecified type        INSTILL 2 SPRAYS INTO BOTH NOSTRILS DAILY   Quantity:  48 mL   Refills:  3       levothyroxine 25 MCG tablet   Commonly known as:  SYNTHROID   Used for:  Hypothyroidism, unspecified type        Dose:  25 mcg   Take 1 tablet (25 mcg) by mouth daily   Quantity:  90 tablet   Refills:  3       linaclotide 145 MCG capsule   Commonly known as:  LINZESS   Used for:  Chronic idiopathic constipation        Dose:  145 mcg   Take 1 capsule (145 mcg) by mouth every morning (before breakfast) Last refill until GI clinic follow up.   Quantity:  30 capsule   Refills:  2       medical cannabis (Patient's own supply.  Not a prescription)        Dose:  1  Dose   Take 1 Dose by mouth See Admin Instructions (This is NOT a prescription, and does not certify that the patient has a qualifying medical condition for medical cannabis.  The purpose of this order is  to document that the patient reports taking medical cannabis.)   Refills:  0       metoclopramide 5 MG tablet   Commonly known as:  REGLAN   Used for:  Intractable chronic migraine without aura and without status migrainosus        Dose:  5-10 mg   Take 1-2 tablets (5-10 mg) by mouth every 6 hours as needed   Quantity:  180 tablet   Refills:  1       MULTIVITAMIN PO        Dose:  1 tablet   Take 1 tablet by mouth daily   Refills:  0       OLANZapine 5 MG tablet   Commonly known as:  zyPREXA   Used for:  Other migraine with status migrainosus, intractable, Intractable chronic migraine without aura and without status migrainosus        Dose:  5 mg   Take 1 tablet (5 mg) by mouth daily as needed (headache pain, nausea)   Quantity:  30 tablet   Refills:  3       omeprazole 40 MG capsule   Commonly known as:  priLOSEC   Used for:  Eosinophilic esophagitis        Dose:  40 mg   Take 1 capsule (40 mg) by mouth 2 times daily as needed   Quantity:  60 capsule   Refills:  3       ondansetron 4 MG ODT tab   Commonly known as:  ZOFRAN-ODT   Used for:  Migraine without status migrainosus, not intractable, unspecified migraine type        Dose:  4-8 mg   Take 1-2 tablets (4-8 mg) by mouth every 12 hours as needed for nausea   Quantity:  60 tablet   Refills:  1       oxyCODONE IR 5 MG tablet   Commonly known as:  ROXICODONE   Used for:  Postlaminectomy syndrome        Dose:  2.5 mg   Take 0.5 tablets (2.5 mg) by mouth every 6 hours as needed for severe pain   Quantity:  15 tablet   Refills:  0       PROBIOTIC DAILY PO        Dose:  2 packet   Take 2 packets by mouth every morning   Refills:  0       Propranolol HCl SR Beads 120 MG Cp24   Used for:  Sinus tachycardia        Dose:  120 mg   Take 120 mg by mouth daily   Quantity:   30 capsule   Refills:  11       rizatriptan 5 MG ODT tab   Commonly known as:  MAXALT-MLT        TAKE 1-2 TABLETS (5-10 MG) BY MOUTH AT ONSET OF HEADACHE FOR MIGRAINE (MAX 30 MG IN 24 HOURS)   Refills:  6       SUMAtriptan 6 MG/0.5ML pen injector kit   Commonly known as:  IMITREX STATDOSE   Used for:  Chronic migraine without aura without status migrainosus, not intractable        Inject 0.5 ml (6 mg) subcutaneously at onset of migraine, May repeat in 1 hour , Max 12 mg/24 hrs   Quantity:  2 kit   Refills:  1       tiZANidine 4 MG tablet   Commonly known as:  ZANAFLEX        Dose:  4 mg   Take 1 tablet (4 mg) by mouth 4 times daily as needed for muscle spasms   Quantity:  120 tablet   Refills:  3       traMADol 50 MG tablet   Commonly known as:  ULTRAM   Used for:  Postlaminectomy syndrome        Dose:  50 mg   Take 1 tablet (50 mg) by mouth every 6 hours as needed for severe pain   Quantity:  60 tablet   Refills:  1       VITAMIN D (CHOLECALCIFEROL) PO        Dose:  2000 Units   Take 2,000 Units by mouth daily   Refills:  0       ZONISAMIDE PO        Dose:  75 mg   Take 75 mg by mouth daily   Refills:  0                Protect others around you: Learn how to safely use, store and throw away your medicines at www.disposemymeds.org.         Follow-ups after your visit        Your next 10 appointments already scheduled     Oct 15, 2018  6:45 PM CDT   (Arrive by 6:30 PM)   Return Visit with Zoe Barahona MD   [facility]ose [facility] Th[facility] ([facility])    [address]  4th [facility] [id number] [phone]            Oct 22, 2018   Procedure with Sadia Carolina MD   [facility], [place], Endoscopy ([facility])    [address] [id number] [phone]           The [facility] is located on the corner of [address] and [address] on the [facility]. It is easily  accessible from virtually any point in the [facility] [place] area, via [address] and [address].            Oct 25, 2018  5:30 PM CDT   (Arrive by 5:15 PM)   CONSULT with Adalberto Shipley MD   [facility] Rheumatology ([facility])    [address]  [address]  [facility] [address]   [phone]            Nov 08, 2018  9:00 AM CST   (Arrive by 8:45 AM)   Return Visit with Faiza Martinez MD   [facility] [[email or web address] for Comprehensive Pain Management ([facility])    [address] [place] [address]  4th Floor  [facility] [address]   [phone]            Dec 31, 2018  2:15 PM CST   (Arrive by 2:00 PM)   New Allergy with Vahe Rodriguez MD   [facility] ([facility])    [address] [place] [address]  [address]  [facility] [address]   [phone]           Do not take anti-histamines or Zantac for seven day prior to your appointment.               Care Instructions        Further instructions from your care team       Directions for after your Thyroid Fine Needle Aspiration:    You can remove your bandage within a few hours.  The site of the biopsy may be sore for a day or two after the procedure. You can take over-the-counter pain medicine if needed.  Notify your doctor if you have any of the following:    Fever above 101 degrees    Swelling in the area of the biopsy    Redness or leaking from the biopsy site  Your doctor will notify you of the results in 2-3 days.     Additional Information About Your Visit        [employer] Information     [employer] gives you secure access to your electronic health record. If you see a primary care provider, you can also send messages to your care team and make appointments. If you have questions, please call your primary care clinic.  If you do not have a primary care provider, please call [phone] and they will assist you.      [employer] is an electronic gateway that provides easy, online access to  your medical records. With [employer], you can request a clinic appointment, read your test results, renew a prescription or communicate with your care team.     To access your existing account, please contact your [facility] or call [phone] for assistance.        Care EveryWhere ID     This is your Care EveryWhere ID. This could be used by other organizations to access your [facility] medical records  [id number]         Primary Care Provider Office Phone # Fax #    Ellyn Rivera [phone]      Equal Access to Services     [facility]: Hadii aad cooper hadasho Soomaali, waaxda luqadaha, qaybta kaalmada adealanyadonnell, odette dykes . So [facility] [phone].    ATENCIÓN: Si habla español, tiene a sweeney disposición servicios gratuitos de asistencia lingüística. [facility] [phone].    We comply with applicable federal civil rights laws and [place] laws. We do not discriminate on the basis of race, color, national origin, age, disability, sex, sexual orientation, or gender identity.            Thank you!     Thank you for choosing [facility] for your care. Our goal is always to provide you with excellent care. Hearing back from our patients is one way we can continue to improve our services. Please take a few minutes to complete the written survey that you may receive in the mail after you visit with us. Thank you!             Medication List: This is a list of all your medications and when to take them. Check marks below indicate your daily home schedule. Keep this list as a reference.          This list is accurate as of 10/4/18 10:25 AM.  Always use your most recent med list.               Medications           Morning Afternoon Evening Bedtime As Needed    ACETAMINOPHEN PO   Take 1,000 mg by mouth every 6 hours as needed for pain Take with tramadol                                ADVAIR DISKUS 250-50 MCG/DOSE diskus inhaler   Inhale 1 puff  into the lungs 2 times daily as needed   Generic drug:  fluticasone-salmeterol                                albuterol 108 (90 Base) MCG/ACT inhaler   Commonly known as:  PROAIR HFA/PROVENTIL HFA/VENTOLIN HFA   Inhale 2 puffs into the lungs every 6 hours as needed                                AMITRIPTYLINE HCL PO   Take 40 mg by mouth daily for two weeks, then increase to 50 mg daily.                                buprenorphine 10 MCG/HR WK patch   Commonly known as:  BUTRANS   Place 1 patch onto the skin every 7 days                                butalbital-acetaminophen-caffeine -40 MG per tablet   Commonly known as:  FIORICET/ESGIC   TAKE ONE TABLET BY MOUTH EVERY 4 HOURS AS NEEDED                                CEFALY KIT Pauly   1 Device daily as needed                                cetirizine 10 MG tablet   Commonly known as:  zyrTEC   Take 10 mg by mouth daily                                COMPRESSION STOCKINGS   1 each daily 20-30 mmHg Thigh Length measure and fit, color and style per patient preference. Doff n jose per need.                                DULOXETINE HCL PO   Take 60 mg by mouth in the morning and 30 mg in the evening.                                eletriptan 20 MG tablet   Commonly known as:  RELPAX   Take 1 tablet (20 mg) by mouth at onset of headache for migraine May repeat in 2 hours. Max 4 tablets/24 hours.                                EPINEPHrine 0.3 MG/0.3ML injection 2-pack   Commonly known as:  EPIPEN/ADRENACLICK/or ANY BX GENERIC EQUIV   Inject 0.3 mg into the muscle                                famotidine 20 MG tablet   Commonly known as:  PEPCID   Take 1 tablet (20 mg) by mouth At Bedtime                                fludrocortisone 0.1 MG tablet   Commonly known as:  FLORINEF   Take 2 tablets (0.2 mg) by mouth daily                                fluticasone 50 MCG/ACT spray   Commonly known as:  FLONASE   INSTILL 2 SPRAYS INTO BOTH NOSTRILS DAILY                                 levothyroxine 25 MCG tablet   Commonly known as:  SYNTHROID   Take 1 tablet (25 mcg) by mouth daily                                linaclotide 145 MCG capsule   Commonly known as:  LINZESS   Take 1 capsule (145 mcg) by mouth every morning (before breakfast) Last refill until GI clinic follow up.                                medical cannabis (Patient's own supply.  Not a prescription)   Take 1 Dose by mouth See Admin Instructions (This is NOT a prescription, and does not certify that the patient has a qualifying medical condition for medical cannabis.  The purpose of this order is  to document that the patient reports taking medical cannabis.)                                metoclopramide 5 MG tablet   Commonly known as:  REGLAN   Take 1-2 tablets (5-10 mg) by mouth every 6 hours as needed                                MULTIVITAMIN PO   Take 1 tablet by mouth daily                                OLANZapine 5 MG tablet   Commonly known as:  zyPREXA   Take 1 tablet (5 mg) by mouth daily as needed (headache pain, nausea)                                omeprazole 40 MG capsule   Commonly known as:  priLOSEC   Take 1 capsule (40 mg) by mouth 2 times daily as needed                                ondansetron 4 MG ODT tab   Commonly known as:  ZOFRAN-ODT   Take 1-2 tablets (4-8 mg) by mouth every 12 hours as needed for nausea                                oxyCODONE IR 5 MG tablet   Commonly known as:  ROXICODONE   Take 0.5 tablets (2.5 mg) by mouth every 6 hours as needed for severe pain                                PROBIOTIC DAILY PO   Take 2 packets by mouth every morning                                Propranolol HCl SR Beads 120 MG Cp24   Take 120 mg by mouth daily                                rizatriptan 5 MG ODT tab   Commonly known as:  MAXALT-MLT   TAKE 1-2 TABLETS (5-10 MG) BY MOUTH AT ONSET OF HEADACHE FOR MIGRAINE (MAX 30 MG IN 24 HOURS)                                SUMAtriptan 6  MG/0.5ML pen injector kit   Commonly known as:  IMITREX STATDOSE   Inject 0.5 ml (6 mg) subcutaneously at onset of migraine, May repeat in 1 hour , Max 12 mg/24 hrs                                tiZANidine 4 MG tablet   Commonly known as:  ZANAFLEX   Take 1 tablet (4 mg) by mouth 4 times daily as needed for muscle spasms                                traMADol 50 MG tablet   Commonly known as:  ULTRAM   Take 1 tablet (50 mg) by mouth every 6 hours as needed for severe pain                                VITAMIN D (CHOLECALCIFEROL) PO   Take 2,000 Units by mouth daily                                ZONISAMIDE PO   Take 75 mg by mouth daily

## 2018-10-05 LAB — COPATH REPORT: NORMAL

## 2018-10-08 ENCOUNTER — CARE COORDINATION (OUTPATIENT)
Dept: OTOLARYNGOLOGY | Facility: CLINIC | Age: 48
End: 2018-10-08

## 2018-10-08 NOTE — PROGRESS NOTES
Called patient with the following pathology results:  CYTOLOGIC INTERPRETATION:      Lymph node, Left Neck Level 2 #1, ultrasound guided fine needle   aspiration:   Negative for malignancy   Scant population of mature appearing lymphocytes   Specimen Adequacy: Satisfactory for evaluation but limited by:   -scant cellularity.     Patient will continue to follow with her PCP and will call with any new or worsening changes. She will follow-up with Dr. Killian as needed. Patient agreeable to plan and was encouraged to call with further questions or concerns.     Daisy Rivera, RN, BSN

## 2018-10-11 DIAGNOSIS — G43.719 INTRACTABLE CHRONIC MIGRAINE WITHOUT AURA AND WITHOUT STATUS MIGRAINOSUS: Primary | ICD-10-CM

## 2018-10-15 ENCOUNTER — TELEPHONE (OUTPATIENT)
Dept: GASTROENTEROLOGY | Facility: CLINIC | Age: 48
End: 2018-10-15

## 2018-10-15 NOTE — TELEPHONE ENCOUNTER
Patient scheduled for EGD     Indication for procedure. Eosinophilic esophagitis    Referring Provider. Jerad Hebert MD    ? No     Arrival time verified? 12 pm     Facility location verified? [address]     Instructions given regarding prep and procedure    Prep Type NPO     Are you taking any anticoagulants or blood thinners? Denies     Instructions given? Yes     Electronic implanted devices? Denies     Pre procedure teaching completed? Yes    Transportation from procedure? Yes     H&P / Pre op physical completed? N/A    Adan Kiser RN

## 2018-10-19 DIAGNOSIS — K44.9 HIATAL HERNIA: ICD-10-CM

## 2018-10-22 ENCOUNTER — SURGERY (OUTPATIENT)
Age: 48
End: 2018-10-22

## 2018-10-22 ENCOUNTER — HOSPITAL ENCOUNTER (OUTPATIENT)
Facility: CLINIC | Age: 48
Discharge: HOME | End: 2018-10-22
Attending: INTERNAL MEDICINE | Admitting: INTERNAL MEDICINE
Payer: COMMERCIAL

## 2018-10-22 VITALS
OXYGEN SATURATION: 98 % | SYSTOLIC BLOOD PRESSURE: 112 MMHG | HEART RATE: 71 BPM | DIASTOLIC BLOOD PRESSURE: 85 MMHG | RESPIRATION RATE: 16 BRPM

## 2018-10-22 LAB — UPPER GI ENDOSCOPY: NORMAL

## 2018-10-22 PROCEDURE — 25000132 ZZH RX MED GY IP 250 OP 250 PS 637: Mod: GY | Performed by: INTERNAL MEDICINE

## 2018-10-22 PROCEDURE — 43239 EGD BIOPSY SINGLE/MULTIPLE: CPT | Performed by: INTERNAL MEDICINE

## 2018-10-22 PROCEDURE — 25000125 ZZHC RX 250: Performed by: INTERNAL MEDICINE

## 2018-10-22 PROCEDURE — 40000141 ZZH STATISTIC PERIPHERAL IV START W/O US GUIDANCE

## 2018-10-22 PROCEDURE — 88305 TISSUE EXAM BY PATHOLOGIST: CPT | Performed by: INTERNAL MEDICINE

## 2018-10-22 PROCEDURE — G0500 MOD SEDAT ENDO SERVICE >5YRS: HCPCS | Performed by: INTERNAL MEDICINE

## 2018-10-22 PROCEDURE — 25000128 H RX IP 250 OP 636: Performed by: INTERNAL MEDICINE

## 2018-10-22 RX ORDER — ONDANSETRON 2 MG/ML
4 INJECTION INTRAMUSCULAR; INTRAVENOUS
Status: DISCONTINUED | OUTPATIENT
Start: 2018-10-22 | End: 2018-10-22 | Stop reason: HOSPADM

## 2018-10-22 RX ORDER — LIDOCAINE 40 MG/G
CREAM TOPICAL
Status: DISCONTINUED | OUTPATIENT
Start: 2018-10-22 | End: 2018-10-22 | Stop reason: HOSPADM

## 2018-10-22 RX ORDER — SIMETHICONE
LIQUID (ML) MISCELLANEOUS PRN
Status: DISCONTINUED | OUTPATIENT
Start: 2018-10-22 | End: 2018-10-22 | Stop reason: HOSPADM

## 2018-10-22 RX ORDER — ONDANSETRON 4 MG/1
4 TABLET, ORALLY DISINTEGRATING ORAL EVERY 6 HOURS PRN
Status: DISCONTINUED | OUTPATIENT
Start: 2018-10-22 | End: 2018-10-22 | Stop reason: HOSPADM

## 2018-10-22 RX ORDER — NALOXONE HYDROCHLORIDE 0.4 MG/ML
.1-.4 INJECTION, SOLUTION INTRAMUSCULAR; INTRAVENOUS; SUBCUTANEOUS
Status: DISCONTINUED | OUTPATIENT
Start: 2018-10-22 | End: 2018-10-22 | Stop reason: HOSPADM

## 2018-10-22 RX ORDER — FLUMAZENIL 0.1 MG/ML
0.2 INJECTION, SOLUTION INTRAVENOUS
Status: DISCONTINUED | OUTPATIENT
Start: 2018-10-22 | End: 2018-10-22 | Stop reason: HOSPADM

## 2018-10-22 RX ORDER — ONDANSETRON 2 MG/ML
4 INJECTION INTRAMUSCULAR; INTRAVENOUS EVERY 6 HOURS PRN
Status: DISCONTINUED | OUTPATIENT
Start: 2018-10-22 | End: 2018-10-22 | Stop reason: HOSPADM

## 2018-10-22 RX ORDER — FENTANYL CITRATE 50 UG/ML
INJECTION, SOLUTION INTRAMUSCULAR; INTRAVENOUS PRN
Status: DISCONTINUED | OUTPATIENT
Start: 2018-10-22 | End: 2018-10-22 | Stop reason: HOSPADM

## 2018-10-22 RX ADMIN — MIDAZOLAM 3 MG: 1 INJECTION INTRAMUSCULAR; INTRAVENOUS at 13:05

## 2018-10-22 RX ADMIN — MIDAZOLAM 1 MG: 1 INJECTION INTRAMUSCULAR; INTRAVENOUS at 13:15

## 2018-10-22 RX ADMIN — MIDAZOLAM 2 MG: 1 INJECTION INTRAMUSCULAR; INTRAVENOUS at 13:11

## 2018-10-22 RX ADMIN — MIDAZOLAM 1 MG: 1 INJECTION INTRAMUSCULAR; INTRAVENOUS at 13:09

## 2018-10-22 RX ADMIN — FENTANYL CITRATE 50 MCG: 50 INJECTION, SOLUTION INTRAMUSCULAR; INTRAVENOUS at 13:05

## 2018-10-22 RX ADMIN — FENTANYL CITRATE 25 MCG: 50 INJECTION, SOLUTION INTRAMUSCULAR; INTRAVENOUS at 13:14

## 2018-10-22 RX ADMIN — Medication 2 ML: at 12:17

## 2018-10-22 RX ADMIN — TOPICAL ANESTHETIC 1 SPRAY: 200 SPRAY DENTAL; PERIODONTAL at 12:56

## 2018-10-22 NOTE — IP AVS SNAPSHOT
[facility], [place], Endoscopy    [address] [address]    Phone:  [phone]                                       After Visit Summary   10/22/2018    Ada Welch    MRN: [id number]           After Visit Summary Signature Page     I have received my discharge instructions, and my questions have been answered. I have discussed any challenges I see with this plan with the nurse or doctor.    ..........................................................................................................................................  Patient/Patient Representative Signature      ..........................................................................................................................................  Patient Representative Print Name and Relationship to Patient    ..................................................               ................................................  Date                                   Time    ..........................................................................................................................................  Reviewed by Signature/Title    ...................................................              ..............................................  Date                                               Time          22EPIC Rev 08/18

## 2018-10-22 NOTE — DISCHARGE INSTRUCTIONS
Discharge Instructions after  Upper Endoscopy (EGD)    Activity and Diet  You were given medicine for pain. You may be dizzy or sleepy.  For 24 hours:    Do not drive or use heavy equipment.    Do not make important decisions.    Do not drink any alcohol.  _x__ You may return to your regular diet.    Discomfort  You may have a sore throat for 2 to 3 days. It may help to:    Avoid hot liquids for 24 hours.    Use sore throat lozenges.    Gargle as needed with salt water up to 4 times a day. Mix 1 cup of warm water  with 1 teaspoon of salt. Do not swallow.  ___    You may take Tylenol (acetaminophen) for pain unless your doctor has told you not to.    Do not take aspirin or ibuprofen (Advil, Motrin) or other NSAIDS  (anti-inflammatory drugs) for _3__ days.    Follow-up  _x__ We took small tissue samples for study. If you do not have a follow-up visit scheduled,  call your provider s office in 2 weeks for the results.    Other instructions________________________________________________________    When to call us:  Problems are rare. Call right away if you have:    Unusual throat pain or trouble swallowing    Unusual pain in belly or chest that is not relieved by belching or passing air    Black stools (tar-like looking bowel movement)    Temperature above 100.6  F. (37.5  C).    If you vomit blood or have severe pain, go to an emergency room.    If you have questions, call:  Monday to Friday, 7 a.m. to 4:30 p.m.: Endoscopy: [phone] (We may have to call you back)    After hours: Hospital: [phone] (Ask for the GI fellow on call)

## 2018-10-22 NOTE — IP AVS SNAPSHOT
MRN:[id number]                      After Visit Summary   10/22/2018    Ada Welch    MRN: [id number]           Thank you!     Thank you for choosing [facility] for your care. Our goal is always to provide you with excellent care. Hearing back from our patients is one way we can continue to improve our services. Please take a few minutes to complete the written survey that you may receive in the mail after you visit with us. Thank you!        Patient Information     Date Of Birth          1970        About your hospital stay     You were admitted on:  October 22, 2018 You last received care in the:  [facility], Endoscopy    You were discharged on:  October 22, 2018       Who to Call     For medical emergencies, please call 911.  For non-urgent questions about your medical care, please call your primary care provider or clinic, [phone]  For questions related to your surgery, please call your surgery clinic        Attending Provider     Provider Specialty    Sadia Carolina MD Gastroenterology       Primary Care Provider Office Phone # Fax #    Ellyn Rivera [phone]      Your next 10 appointments already scheduled     Oct 25, 2018  5:30 PM CDT   (Arrive by 5:15 PM)   CONSULT with Adalberto Shipley MD   [facility] Rheumatology ([facility] [place])    [address] [place] [id number] [phone]            Oct 30, 2018  3:45 PM CDT   (Arrive by 3:30 PM)   Return Visit with Shilpa RAMIREZ MD   [facility] ([facility] [place])    [address] [place] [id number] [phone]            Nov 08, 2018  9:00 AM CST   (Arrive by 8:45 AM)   Return Visit with Faiza Martinez MD   [facility] ([facility] [place])    [address] [place] [address] [id number] [phone]            Dec  03, 2018  6:30 PM CST   (Arrive by 6:15 PM)   Return Visit with Zoe Barahona MD   [facility] ([facility])    [address]  4th [address]  [facility] [id number] [phone]            Dec 31, 2018  2:15 PM CST   (Arrive by 2:00 PM)   New Allergy with Vahe Rodriguez MD   [facility] ([facility])    [address]  [address]  [facility] [id number] [phone]           Do not take anti-histamines or Zantac for seven day prior to your appointment.              Further instructions from your care team       Discharge Instructions after  Upper Endoscopy (EGD)    Activity and Diet  You were given medicine for pain. You may be dizzy or sleepy.  For 24 hours:    Do not drive or use heavy equipment.    Do not make important decisions.    Do not drink any alcohol.  _x__ You may return to your regular diet.    Discomfort  You may have a sore throat for 2 to 3 days. It may help to:    Avoid hot liquids for 24 hours.    Use sore throat lozenges.    Gargle as needed with salt water up to 4 times a day. Mix 1 cup of warm water  with 1 teaspoon of salt. Do not swallow.  ___    You may take Tylenol (acetaminophen) for pain unless your doctor has told you not to.    Do not take aspirin or ibuprofen (Advil, Motrin) or other NSAIDS  (anti-inflammatory drugs) for _3__ days.    Follow-up  _x__ We took small tissue samples for study. If you do not have a follow-up visit scheduled,  call your provider s office in 2 weeks for the results.    Other instructions________________________________________________________    When to call us:  Problems are rare. Call right away if you have:    Unusual throat pain or trouble swallowing    Unusual pain in belly or chest that is not relieved by belching or passing air    Black stools (tar-like looking bowel movement)    Temperature above 100.6  F. (37.5  C).    If you  vomit blood or have severe pain, go to an emergency room.    If you have questions, call:  Monday to Friday, 7 a.m. to 4:30 p.m.: Endoscopy: [phone] (We may have to call you back)    After hours: Hospital: [phone] (Ask for the GI fellow on call)    Pending Results     No orders found from 10/20/2018 to 10/23/2018.            Admission Information     Date & Time Provider Department Dept. Phone    10/22/2018 Sadia Carolina MD [facility] [place], Endoscopy [phone]      Your Vitals Were     Blood Pressure Pulse Respirations Last Period Pulse Oximetry       104/75 71 9 08/28/2018 (Approximate) 98%       MyChart Information     [employer] gives you secure access to your electronic health record. If you see a primary care provider, you can also send messages to your care team and make appointments. If you have questions, please call your primary care clinic.  If you do not have a primary care provider, please call [phone] and they will assist you.        Care EveryWhere ID     This is your Care EveryWhere ID. This could be used by other organizations to access your [facility] medical records  [id number]        Equal Access to Services     CHRISTINA GUY AH: Hadchantel serrano Sonegrito, waaxda luqadaha, qaybta kaalmada adealanyada, odette tang. So [facility] [phone].    ATENCIÓN: Si habla español, tiene a sweeney disposición servicios gratuitos de asistencia lingüística. Llame al [phone].    We comply with applicable federal civil rights laws and [place] laws. We do not discriminate on the basis of race, color, national origin, age, disability, sex, sexual orientation, or gender identity.               Review of your medicines      UNREVIEWED medicines. Ask your doctor about these medicines        Dose / Directions    ACETAMINOPHEN PO        Dose:  1000 mg   Take 1,000 mg by mouth every 6 hours as needed for pain Take with tramadol   Refills:  0       ADVAIR DISKUS 250-50 MCG/DOSE  diskus inhaler   Generic drug:  fluticasone-salmeterol        Dose:  1 puff   Inhale 1 puff into the lungs 2 times daily as needed   Refills:  0       albuterol 108 (90 Base) MCG/ACT inhaler   Commonly known as:  PROAIR HFA/PROVENTIL HFA/VENTOLIN HFA        Dose:  2 puff   Inhale 2 puffs into the lungs every 6 hours as needed   Refills:  0       AMITRIPTYLINE HCL PO        Take 40 mg by mouth daily for two weeks, then increase to 50 mg daily.   Refills:  0       buprenorphine 10 MCG/HR WK patch   Commonly known as:  BUTRANS   Used for:  Postlaminectomy syndrome of lumbar region        Dose:  1 patch   Place 1 patch onto the skin every 7 days   Quantity:  4 patch   Refills:  1       butalbital-acetaminophen-caffeine -40 MG per tablet   Commonly known as:  FIORICET/ESGIC        TAKE ONE TABLET BY MOUTH EVERY 4 HOURS AS NEEDED   Refills:  0       cetirizine 10 MG tablet   Commonly known as:  zyrTEC        Dose:  10 mg   Take 10 mg by mouth daily   Refills:  0       DULOXETINE HCL PO        Take 60 mg by mouth in the morning and 30 mg in the evening.   Refills:  0       eletriptan 20 MG tablet   Commonly known as:  RELPAX   Used for:  Chronic daily headache, Intractable migraine without aura and with status migrainosus        Dose:  20 mg   Take 1 tablet (20 mg) by mouth at onset of headache for migraine May repeat in 2 hours. Max 4 tablets/24 hours.   Quantity:  18 tablet   Refills:  1       EPINEPHrine 0.3 MG/0.3ML injection 2-pack   Commonly known as:  EPIPEN/ADRENACLICK/or ANY BX GENERIC EQUIV        Dose:  0.3 mg   Inject 0.3 mg into the muscle   Refills:  0       erenumab-aooe 70 MG/ML injection   Commonly known as:  AIMOVIG 140 DOSE   Used for:  Intractable chronic migraine without aura and without status migrainosus        Dose:  140 mg   Inject 2 mLs (140 mg) Subcutaneous every 30 days   Quantity:  2 pen   Refills:  11       famotidine 20 MG tablet   Commonly known as:  PEPCID   Used for:  Hiatal hernia,  Mast cell disorder        Dose:  20 mg   Take 1 tablet (20 mg) by mouth At Bedtime   Quantity:  90 tablet   Refills:  1       fludrocortisone 0.1 MG tablet   Commonly known as:  FLORINEF   Used for:  Autonomic dysfunction, Pre-syncope        Dose:  0.2 mg   Take 2 tablets (0.2 mg) by mouth daily   Quantity:  180 tablet   Refills:  3       fluticasone 50 MCG/ACT spray   Commonly known as:  FLONASE   Used for:  Chronic rhinitis, unspecified type        INSTILL 2 SPRAYS INTO BOTH NOSTRILS DAILY   Quantity:  48 mL   Refills:  3       levothyroxine 25 MCG tablet   Commonly known as:  SYNTHROID   Used for:  Hypothyroidism, unspecified type        Dose:  25 mcg   Take 1 tablet (25 mcg) by mouth daily   Quantity:  90 tablet   Refills:  3       linaclotide 145 MCG capsule   Commonly known as:  LINZESS   Used for:  Chronic idiopathic constipation        Dose:  145 mcg   Take 1 capsule (145 mcg) by mouth every morning (before breakfast) Last refill until GI clinic follow up.   Quantity:  30 capsule   Refills:  2       metoclopramide 5 MG tablet   Commonly known as:  REGLAN   Used for:  Intractable chronic migraine without aura and without status migrainosus        Dose:  5-10 mg   Take 1-2 tablets (5-10 mg) by mouth every 6 hours as needed   Quantity:  180 tablet   Refills:  1       MULTIVITAMIN PO        Dose:  1 tablet   Take 1 tablet by mouth daily   Refills:  0       OLANZapine 5 MG tablet   Commonly known as:  zyPREXA   Used for:  Other migraine with status migrainosus, intractable, Intractable chronic migraine without aura and without status migrainosus        Dose:  5 mg   Take 1 tablet (5 mg) by mouth daily as needed (headache pain, nausea)   Quantity:  30 tablet   Refills:  3       omeprazole 40 MG capsule   Commonly known as:  priLOSEC   Used for:  Eosinophilic esophagitis        Dose:  40 mg   Take 1 capsule (40 mg) by mouth 2 times daily as needed   Quantity:  60 capsule   Refills:  3       ondansetron 4 MG ODT tab    Commonly known as:  ZOFRAN-ODT   Used for:  Migraine without status migrainosus, not intractable, unspecified migraine type        Dose:  4-8 mg   Take 1-2 tablets (4-8 mg) by mouth every 12 hours as needed for nausea   Quantity:  60 tablet   Refills:  1       oxyCODONE IR 5 MG tablet   Commonly known as:  ROXICODONE   Used for:  Postlaminectomy syndrome        Dose:  2.5 mg   Take 0.5 tablets (2.5 mg) by mouth every 6 hours as needed for severe pain   Quantity:  15 tablet   Refills:  0       PROBIOTIC DAILY PO        Dose:  2 packet   Take 2 packets by mouth every morning   Refills:  0       Propranolol HCl SR Beads 120 MG Cp24   Used for:  Sinus tachycardia        Dose:  120 mg   Take 120 mg by mouth daily   Quantity:  30 capsule   Refills:  11       rizatriptan 5 MG ODT tab   Commonly known as:  MAXALT-MLT        TAKE 1-2 TABLETS (5-10 MG) BY MOUTH AT ONSET OF HEADACHE FOR MIGRAINE (MAX 30 MG IN 24 HOURS)   Refills:  6       SUMAtriptan 6 MG/0.5ML pen injector kit   Commonly known as:  IMITREX STATDOSE   Used for:  Chronic migraine without aura without status migrainosus, not intractable        Inject 0.5 ml (6 mg) subcutaneously at onset of migraine, May repeat in 1 hour , Max 12 mg/24 hrs   Quantity:  2 kit   Refills:  1       tiZANidine 4 MG tablet   Commonly known as:  ZANAFLEX        Dose:  4 mg   Take 1 tablet (4 mg) by mouth 4 times daily as needed for muscle spasms   Quantity:  120 tablet   Refills:  3       traMADol 50 MG tablet   Commonly known as:  ULTRAM   Used for:  Postlaminectomy syndrome        Dose:  50 mg   Take 1 tablet (50 mg) by mouth every 6 hours as needed for severe pain   Quantity:  60 tablet   Refills:  1       vitamin B complex with vitamin C Tabs tablet        Dose:  1 tablet   Take 1 tablet by mouth daily   Refills:  0       VITAMIN D (CHOLECALCIFEROL) PO        Dose:  2000 Units   Take 2,000 Units by mouth daily   Refills:  0       ZONISAMIDE PO        Dose:  75 mg   Take 75 mg by  mouth daily   Refills:  0         CONTINUE these medicines which have NOT CHANGED        Dose / Directions    CEFALY KIT Pauly   Used for:  Intractable chronic migraine without aura and without status migrainosus        Dose:  1 Device   1 Device daily as needed   Quantity:  1 Device   Refills:  0       COMPRESSION STOCKINGS   Used for:  Orthostatic hypotension        Dose:  1 each   1 each daily 20-30 mmHg Thigh Length measure and fit, color and style per patient preference. Doff n jose per need.   Quantity:  3 each   Refills:  3       medical cannabis (Patient's own supply.  Not a prescription)        Dose:  1 Dose   Take 1 Dose by mouth See Admin Instructions (This is NOT a prescription, and does not certify that the patient has a qualifying medical condition for medical cannabis.  The purpose of this order is  to document that the patient reports taking medical cannabis.)   Refills:  0                Protect others around you: Learn how to safely use, store and throw away your medicines at www.disposemymeds.org.             Medication List: This is a list of all your medications and when to take them. Check marks below indicate your daily home schedule. Keep this list as a reference.      Medications           Morning Afternoon Evening Bedtime As Needed    ACETAMINOPHEN PO   Take 1,000 mg by mouth every 6 hours as needed for pain Take with tramadol                                ADVAIR DISKUS 250-50 MCG/DOSE diskus inhaler   Inhale 1 puff into the lungs 2 times daily as needed   Generic drug:  fluticasone-salmeterol                                albuterol 108 (90 Base) MCG/ACT inhaler   Commonly known as:  PROAIR HFA/PROVENTIL HFA/VENTOLIN HFA   Inhale 2 puffs into the lungs every 6 hours as needed                                AMITRIPTYLINE HCL PO   Take 40 mg by mouth daily for two weeks, then increase to 50 mg daily.                                buprenorphine 10 MCG/HR WK patch   Commonly known as:   BUTRANS   Place 1 patch onto the skin every 7 days                                butalbital-acetaminophen-caffeine -40 MG per tablet   Commonly known as:  FIORICET/ESGIC   TAKE ONE TABLET BY MOUTH EVERY 4 HOURS AS NEEDED                                CEFALY KIT Pauly   1 Device daily as needed                                cetirizine 10 MG tablet   Commonly known as:  zyrTEC   Take 10 mg by mouth daily                                COMPRESSION STOCKINGS   1 each daily 20-30 mmHg Thigh Length measure and fit, color and style per patient preference. Doff n jose per need.                                DULOXETINE HCL PO   Take 60 mg by mouth in the morning and 30 mg in the evening.                                eletriptan 20 MG tablet   Commonly known as:  RELPAX   Take 1 tablet (20 mg) by mouth at onset of headache for migraine May repeat in 2 hours. Max 4 tablets/24 hours.                                EPINEPHrine 0.3 MG/0.3ML injection 2-pack   Commonly known as:  EPIPEN/ADRENACLICK/or ANY BX GENERIC EQUIV   Inject 0.3 mg into the muscle                                erenumab-aooe 70 MG/ML injection   Commonly known as:  AIMOVIG 140 DOSE   Inject 2 mLs (140 mg) Subcutaneous every 30 days                                famotidine 20 MG tablet   Commonly known as:  PEPCID   Take 1 tablet (20 mg) by mouth At Bedtime                                fludrocortisone 0.1 MG tablet   Commonly known as:  FLORINEF   Take 2 tablets (0.2 mg) by mouth daily                                fluticasone 50 MCG/ACT spray   Commonly known as:  FLONASE   INSTILL 2 SPRAYS INTO BOTH NOSTRILS DAILY                                levothyroxine 25 MCG tablet   Commonly known as:  SYNTHROID   Take 1 tablet (25 mcg) by mouth daily                                linaclotide 145 MCG capsule   Commonly known as:  LINZESS   Take 1 capsule (145 mcg) by mouth every morning (before breakfast) Last refill until GI clinic follow up.                                 medical cannabis (Patient's own supply.  Not a prescription)   Take 1 Dose by mouth See Admin Instructions (This is NOT a prescription, and does not certify that the patient has a qualifying medical condition for medical cannabis.  The purpose of this order is  to document that the patient reports taking medical cannabis.)                                metoclopramide 5 MG tablet   Commonly known as:  REGLAN   Take 1-2 tablets (5-10 mg) by mouth every 6 hours as needed                                MULTIVITAMIN PO   Take 1 tablet by mouth daily                                OLANZapine 5 MG tablet   Commonly known as:  zyPREXA   Take 1 tablet (5 mg) by mouth daily as needed (headache pain, nausea)                                omeprazole 40 MG capsule   Commonly known as:  priLOSEC   Take 1 capsule (40 mg) by mouth 2 times daily as needed                                ondansetron 4 MG ODT tab   Commonly known as:  ZOFRAN-ODT   Take 1-2 tablets (4-8 mg) by mouth every 12 hours as needed for nausea                                oxyCODONE IR 5 MG tablet   Commonly known as:  ROXICODONE   Take 0.5 tablets (2.5 mg) by mouth every 6 hours as needed for severe pain                                PROBIOTIC DAILY PO   Take 2 packets by mouth every morning                                Propranolol HCl SR Beads 120 MG Cp24   Take 120 mg by mouth daily                                rizatriptan 5 MG ODT tab   Commonly known as:  MAXALT-MLT   TAKE 1-2 TABLETS (5-10 MG) BY MOUTH AT ONSET OF HEADACHE FOR MIGRAINE (MAX 30 MG IN 24 HOURS)                                SUMAtriptan 6 MG/0.5ML pen injector kit   Commonly known as:  IMITREX STATDOSE   Inject 0.5 ml (6 mg) subcutaneously at onset of migraine, May repeat in 1 hour , Max 12 mg/24 hrs                                tiZANidine 4 MG tablet   Commonly known as:  ZANAFLEX   Take 1 tablet (4 mg) by mouth 4 times daily as needed for muscle spasms                                 traMADol 50 MG tablet   Commonly known as:  ULTRAM   Take 1 tablet (50 mg) by mouth every 6 hours as needed for severe pain                                vitamin B complex with vitamin C Tabs tablet   Take 1 tablet by mouth daily                                VITAMIN D (CHOLECALCIFEROL) PO   Take 2,000 Units by mouth daily                                ZONISAMIDE PO   Take 75 mg by mouth daily

## 2018-10-25 ENCOUNTER — RADIANT APPOINTMENT (OUTPATIENT)
Dept: GENERAL RADIOLOGY | Facility: CLINIC | Age: 48
End: 2018-10-25
Payer: COMMERCIAL

## 2018-10-25 ENCOUNTER — OFFICE VISIT (OUTPATIENT)
Dept: RHEUMATOLOGY | Facility: CLINIC | Age: 48
End: 2018-10-25
Attending: INTERNAL MEDICINE
Payer: COMMERCIAL

## 2018-10-25 ENCOUNTER — TELEPHONE (OUTPATIENT)
Dept: INTERNAL MEDICINE | Facility: CLINIC | Age: 48
End: 2018-10-25

## 2018-10-25 VITALS
RESPIRATION RATE: 16 BRPM | TEMPERATURE: 98.1 F | HEART RATE: 75 BPM | OXYGEN SATURATION: 98 % | DIASTOLIC BLOOD PRESSURE: 77 MMHG | BODY MASS INDEX: 35 KG/M2 | SYSTOLIC BLOOD PRESSURE: 110 MMHG | WEIGHT: 223 LBS | HEIGHT: 67 IN

## 2018-10-25 DIAGNOSIS — G89.4 PAIN SYNDROME, CHRONIC: ICD-10-CM

## 2018-10-25 DIAGNOSIS — M12.9 ARTHRITIS INVOLVING MULTIPLE SITES: ICD-10-CM

## 2018-10-25 DIAGNOSIS — Q79.60 EHLERS-DANLOS SYNDROME: ICD-10-CM

## 2018-10-25 DIAGNOSIS — Q79.60 EHLERS-DANLOS SYNDROME: Primary | ICD-10-CM

## 2018-10-25 PROCEDURE — G0463 HOSPITAL OUTPT CLINIC VISIT: HCPCS | Mod: ZF

## 2018-10-25 ASSESSMENT — PAIN SCALES - GENERAL: PAINLEVEL: SEVERE PAIN (7)

## 2018-10-25 NOTE — NURSING NOTE
"Chief Complaint   Patient presents with     Consult     Arthritis, Tom-Danlos syndrome       /77 (BP Location: Right arm, Patient Position: Sitting, Cuff Size: Adult Large)  Pulse 75  Temp 98.1  F (36.7  C) (Oral)  Resp 16  Ht 1.702 m (5' 7\")  Wt 101.2 kg (223 lb)  SpO2 98%  BMI 34.93 kg/m2    Elise Hills [facility]  10/25/2018 5:38 PM      "

## 2018-10-25 NOTE — PROGRESS NOTES
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    She has multiple musculoskeletal complaints and findings on radiography of degenerative changes in the spine particularly, and lumbar disc disease. She has had extensive lumbar spine surgery.     She has a chronic pain syndrome per above.    2. Multiple medical problems as noted in the medical record.    PLAN: I had a long discussion with the patient about her known ED hypermobility type, but with multiple symptomatic musculoskeletal problems leading to chronic pain.  She is aware there is no specific therapy for ED.    1.  In order to confirm prior assessments that she does not have a concomitant inflammatory arthritis or autoimmune disease, testing is ordered today per EPIC orders.  2.  Write with results and recommendations  3.  Unless she has a new diagnosis of a systemic rheumatic disease requiring immunomodulatory therapy, I will not plan to see her in followup as I have nothing else to offer from a therapeutic option perspective and she is seeing multiple other providers.    Adalberto Shipley MD MACR      60 minute appointment face to face, 35 minutes counseling and coordination of care, with 30 minutes additional record review.          History of Present Illness:   Ada Welch is a 47 year old female who presents for evaluation of musculoskeletal complaints in setting of known diagnosis of Tom Danlos syndrome.  EPIC and Care Everywhere reviewed. Referred by Dr. Leach, Hematology.     She has previously been followed in [place], including by her rheumatologist Dr. Pascual, but wishes to consolidate all needed specialist care here.     She has documented Tom-Danlos syndrome of the  hypermobility type, as do several relatives.    She has associated or concomitant musculoskeletal diagnoses of spine facet joint arthritis at multiple levels, lumbar degenerative disc disease, spondylolisthesis of lumbar area, peripheral joint and periarticular symptoms, and chronic pain. She is s/p spine surgery as noted in record.    She has daily pain in multiple areas that impact her ADLs.  She is on a complex medical regimen for pain and managed in the Pain Clinic by Dr. Martinez, and PCC with her PCP Dr. Rivera.  She is on medical cannabis as well.     Just within the past 60 days she has seen Internal Medicine, Pain Clinic, GI, ENT, Urology, Neurology, Allergy, Hematology, Cardiology, PM&R.    She denies history of joint dislocations. No history of vascular emergency, intraabdominal issues requiring surgery.    Her rheumatologic ROS is negative for photosensitivity, kidney disease, seizures.        Review of Systems:   Review Of Systems  Skin: negative  Eyes: negative  Ears/Nose/Throat: negative  Respiratory: No shortness of breath, dyspnea on exertion, cough, or hemoptysis  Cardiovascular: negative  Gastrointestinal: negative  Genitourinary: negative  Musculoskeletal: see HPI  Neurologic: negative  Psychiatric: negative  Hematologic/Lymphatic/Immunologic: negative  Endocrine: negative          Past Medical History:     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     Chronic sinusitis 00/2007    Diagnosed with chronic pansinusitis 2016     Chronic tonsillitis 1980    Had tonsils removed 02/1981, rheumatic fever     Tom-Danlos disease      Eosinophilic esophagitis 08/2018     Gastroesophageal reflux disease 3/1/2017    I have large hiatal hernia      Hernia, abdominal 10/2016    Hiatal Hernia     Hiatal hernia 2016    Have adeline hiatel hernia     Hoarseness Unsure    It comes and goes     IBS (irritable bowel syndrome) with constipation     improved on Linzess     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     Migraines 1/1/2007    Unsure of exact date     Nasal polyps 2013    Were revoved 03/2017, benign     Other nervous system complications 1/2014    Autonomic nervous system disorder, neuralgia, neuropathy     Pneumonia Unsure    Have had pneumonia several times     Swelling, mass, or lump in head and neck 08/2016    Lymph node has been growing for last year     Thyroid disease 01/01/2008       Patient Active Problem List    Diagnosis Date Noted     Intractable migraine with status migrainosus 07/24/2018     Priority: Medium     Migraine 07/07/2018     Priority: Medium     Autonomic dysfunction 06/07/2018     Priority: Medium     Low back pain of multiple sites of spine with sciatica 12/09/2017     Priority: Medium     Intractable chronic migraine without aura and without status migrainosus 12/09/2017     Priority: Medium     Non compliance with medical treatment 12/09/2017     Priority: Medium     Obese 12/09/2017     Priority: Medium     Slow transit constipation 08/30/2017     Priority: Medium     Urinary urgency 08/30/2017     Priority: Medium     Nocturia 08/30/2017     Priority: Medium     Cardiac device in situ- [employer] Implanted Loop Recorder (ILR) 08/30/2017     Priority: Medium     Cervicalgia 08/25/2017     Priority: Medium     Headache 07/25/2017     Priority: Medium     Postlaminectomy syndrome, lumbar region 06/14/2017     Priority: Medium     Lymphadenopathy of left cervical region 03/27/2017     Priority: Medium     Easy bruising 10/13/2016     Priority: Medium     Head and neck lymphadenopathy 10/13/2016     Priority: Medium     Hepatomegaly 10/13/2016     Priority: Medium     Liver lesion 10/13/2016     Priority:  Medium     Pain syndrome, chronic 09/05/2016     Priority: Medium     s/p TLIF L4-5,L5-S1 with solid arthrodesis 09/05/2016     Priority: Medium     Tom-Danlos syndrome 09/05/2016     Priority: Medium     Acquired hypothyroidism 07/22/2016     Priority: Medium     Anxiety 07/22/2016     Priority: Medium     Opioid dependence (H) 07/22/2016     Priority: Medium     Overview:   Treatment Agreement       Essential hypertension with goal blood pressure less than 140/90 07/22/2016     Priority: Medium     Low back pain 07/12/2016     Priority: Medium     Lumbar radiculopathy 02/18/2016     Priority: Medium     Encounter for genetic counseling and testing 01/05/2016     Priority: Medium     Overview:     [employer] Aortopathy Comprehensive Panel ordered 1/5/2016.       Sinus tachycardia 09/29/2015     Priority: Medium     Astigmatism 12/12/2014     Priority: Medium     Keratoconjunctivitis sicca (H) 12/12/2014     Priority: Medium     Presbyopia 12/12/2014     Priority: Medium     Opioid type dependence, continuous (H) 10/09/2014     Priority: Medium     Other acute postoperative pain 10/09/2014     Priority: Medium     Anxiety disorder 08/21/2014     Priority: Medium     Depression 08/21/2014     Priority: Medium     Pain disorder associated with psychological and physical factors 08/21/2014     Priority: Medium     Low back pain radiating to both legs 07/08/2014     Priority: Medium     Arthritis of facet joints at multiple vertebral levels (H) 01/01/2014     Priority: Medium     Spondylolisthesis of lumbar region 12/10/2013     Priority: Medium     Lumbar degenerative disc disease 11/21/2013     Priority: Medium     DNS (deviated nasal septum) 10/09/2012     Priority: Medium     Nasal turbinate hypertrophy 10/09/2012     Priority: Medium     Dysuria 07/12/2011     Priority: Medium     Chronic sinusitis with recurrent bronchitis 02/01/2007     Priority: Medium     Allergic rhinitis 01/09/2007     Priority: Medium      Cyst of fallopian tube 12/28/1996     Priority: Medium     Arthritis involving multiple sites 01/01/1986     Priority: Medium     Uterine endometriosis 01/01/1986     Priority: Medium             Past Surgical History:     Past Surgical History:   Procedure Laterality Date     ADENOIDECTOMY  1981     AS REPAIR OF NASAL SEPTUM       BACK SURGERY  12/09/2013  10/01/2014    Spinal fusion L4-S1, L5 moving 5/8ths in. Remove screws/rods     BIOPSY  01/01/2008 & 3/2017    mole biopsy, lymph node biopsy     COLONOSCOPY  3/2017    Colonoscopy and GI     ESOPHAGOSCOPY, GASTROSCOPY, DUODENOSCOPY (EGD), COMBINED N/A 8/3/2018    Procedure: COMBINED ESOPHAGOSCOPY, GASTROSCOPY, DUODENOSCOPY (EGD), BIOPSY SINGLE OR MULTIPLE;;  Surgeon: Juan Woodson MD;  Location:  GI     ESOPHAGOSCOPY, GASTROSCOPY, DUODENOSCOPY (EGD), COMBINED N/A 10/22/2018    Procedure: COMBINED ESOPHAGOSCOPY, GASTROSCOPY, DUODENOSCOPY (EGD), BIOPSY SINGLE OR MULTIPLE;  Surgeon: Sadia Carolina MD;  Location:  GI     NASAL/SINUS POLYPECTOMY  2017    Polyps were benign     TONSILLECTOMY       TONSILLECTOMY  1981     TUBAL LIGATION               Social History:     Social History   Substance Use Topics     Smoking status: Former Smoker     Packs/day: 0.20     Years: 10.00     Types: Cigarettes     Start date: 1/1/1991     Quit date: 12/1/2013     Smokeless tobacco: Never Used      Comment: I somked on and off during specified dates.     Alcohol use Yes      Comment: occasional             Family History:     Family History   Problem Relation Age of Onset     Connective Tissue Disorder Mother      eds     Connective Tissue Disorder Sister      eds     Cancer Father      Spinal tumor grew into spinal cord, went in brain     Migraines Father      Diabetes Maternal Grandmother      Elderly diabetes     HEART DISEASE Maternal Grandmother      Hypertension Maternal Grandmother      Diabetes Paternal Grandmother      Elderly diabetes     Diabetes Paternal  Grandfather      Elderly diabetes     Asthma Sister      Pediatric Asthma     Migraines Sister      Asthma Son      Pediatric Asthma     Thyroid Disease Sister      ,     Migraines Sister      Migraines Sister      Breast Cancer No family hx of      EDS per above.  No FH of autoimmune disease       Allergies:     Allergies   Allergen Reactions     Bee Venom Anaphylaxis, Difficulty breathing, Palpitations, Shortness Of Breath and Visual Disturbance     Patient does carry Epi-Pen     No Clinical Screening - See Comments Anaphylaxis     Chicken-Derived Products (Egg) Diarrhea and Nausea     Patient will self monitor  Other reaction(s): Nausea  Patient will self monitor  Other reaction(s): GI intolerance     Compazine [Prochlorperazine] Other (See Comments)     Extreme jittery     Food      Milk     Gabapentin      Gluten Meal Other (See Comments)     Pregabalin Other (See Comments)     Topiramate      Wheat Diarrhea     Wheat Bran Nausea     Patient will self monitor  Other reaction(s): Nausea  Patient will self monitor             Medications:     Current Outpatient Prescriptions   Medication Sig Dispense Refill     ACETAMINOPHEN PO Take 1,000 mg by mouth every 6 hours as needed for pain Take with tramadol       albuterol (PROAIR HFA/PROVENTIL HFA/VENTOLIN HFA) 108 (90 BASE) MCG/ACT Inhaler Inhale 2 puffs into the lungs every 6 hours as needed        AMITRIPTYLINE HCL PO Take 40 mg by mouth daily for two weeks, then increase to 50 mg daily.       buprenorphine (BUTRANS) 10 MCG/HR WK patch Place 1 patch onto the skin every 7 days 4 patch 1     butalbital-acetaminophen-caffeine (FIORICET/ESGIC) -40 MG per tablet TAKE ONE TABLET BY MOUTH EVERY 4 HOURS AS NEEDED  0     cetirizine (ZYRTEC) 10 MG tablet Take 10 mg by mouth daily        COMPRESSION STOCKINGS 1 each daily 20-30 mmHg Thigh Length measure and fit, color and style per patient preference. Antolin garcia per need. 3 each 3     DULOXETINE HCL PO Take 60 mg by  mouth in the morning and 30 mg in the evening.       eletriptan (RELPAX) 20 MG tablet Take 1 tablet (20 mg) by mouth at onset of headache for migraine May repeat in 2 hours. Max 4 tablets/24 hours. 18 tablet 1     EPINEPHrine (EPIPEN/ADRENACLICK/OR ANY BX GENERIC EQUIV) 0.3 MG/0.3ML injection 2-pack Inject 0.3 mg into the muscle       erenumab-aooe (AIMOVIG 140 DOSE) 70 MG/ML injection Inject 2 mLs (140 mg) Subcutaneous every 30 days 2 pen 11     famotidine (PEPCID) 20 MG tablet Take 1 tablet (20 mg) by mouth At Bedtime 90 tablet 1     fludrocortisone (FLORINEF) 0.1 MG tablet Take 2 tablets (0.2 mg) by mouth daily (Patient not taking: Reported on 9/20/2018) 180 tablet 3     fluticasone (FLONASE) 50 MCG/ACT spray INSTILL 2 SPRAYS INTO BOTH NOSTRILS DAILY 48 mL 3     fluticasone-salmeterol (ADVAIR DISKUS) 250-50 MCG/DOSE diskus inhaler Inhale 1 puff into the lungs 2 times daily as needed        levothyroxine (SYNTHROID) 25 MCG tablet Take 1 tablet (25 mcg) by mouth daily 90 tablet 3     linaclotide (LINZESS) 145 MCG capsule Take 1 capsule (145 mcg) by mouth every morning (before breakfast) Last refill until GI clinic follow up. 30 capsule 2     medical cannabis (Patient's own supply.  Not a prescription) Take 1 Dose by mouth See Admin Instructions (This is NOT a prescription, and does not certify that the patient has a qualifying medical condition for medical cannabis.  The purpose of this order is  to document that the patient reports taking medical cannabis.)       metoclopramide (REGLAN) 5 MG tablet Take 1-2 tablets (5-10 mg) by mouth every 6 hours as needed 180 tablet 1     Multiple Vitamins-Minerals (MULTIVITAMIN PO) Take 1 tablet by mouth daily        Nerve Stimulator (CEFALY KIT) KAROLINE 1 Device daily as needed 1 Device 0     OLANZapine (ZYPREXA) 5 MG tablet Take 1 tablet (5 mg) by mouth daily as needed (headache pain, nausea) 30 tablet 3     omeprazole (PRILOSEC) 40 MG capsule Take 1 capsule (40 mg) by mouth 2  "times daily as needed 60 capsule 3     ondansetron (ZOFRAN-ODT) 4 MG ODT tab Take 1-2 tablets (4-8 mg) by mouth every 12 hours as needed for nausea 60 tablet 1     oxyCODONE IR (ROXICODONE) 5 MG tablet Take 0.5 tablets (2.5 mg) by mouth every 6 hours as needed for severe pain 15 tablet 0     Probiotic Product (PROBIOTIC DAILY PO) Take 2 packets by mouth every morning        Propranolol HCl SR Beads 120 MG CP24 Take 120 mg by mouth daily 30 capsule 11     rizatriptan (MAXALT-MLT) 5 MG ODT tab TAKE 1-2 TABLETS (5-10 MG) BY MOUTH AT ONSET OF HEADACHE FOR MIGRAINE (MAX 30 MG IN 24 HOURS)  6     SUMAtriptan (IMITREX STATDOSE) 6 MG/0.5ML pen injector kit Inject 0.5 ml (6 mg) subcutaneously at onset of migraine, May repeat in 1 hour , Max 12 mg/24 hrs 2 kit 1     tiZANidine (ZANAFLEX) 4 MG tablet Take 1 tablet (4 mg) by mouth 4 times daily as needed for muscle spasms 120 tablet 3     traMADol (ULTRAM) 50 MG tablet Take 1 tablet (50 mg) by mouth every 6 hours as needed for severe pain 60 tablet 1     vitamin B complex with vitamin C (VITAMIN  B COMPLEX) TABS tablet Take 1 tablet by mouth daily       VITAMIN D, CHOLECALCIFEROL, PO Take 2,000 Units by mouth daily       ZONISAMIDE PO Take 75 mg by mouth daily              Physical Exam:   /77 (BP Location: Right arm, Patient Position: Sitting, Cuff Size: Adult Large)  Pulse 75  Temp 98.1  F (36.7  C) (Oral)  Resp 16  Ht 1.702 m (5' 7\")  Wt 101.2 kg (223 lb)  SpO2 98%  BMI 34.93 kg/m2    Constitutional: WD-WN-WG cooperative  Eyes: nl EOM, conjunctiva, sclera  ENT: nl external ears, nose, throat  No mucous membrane lesions, normal saliva pool  Neck: no mass or thyroid enlargement  Resp: lungs clear to auscultation, nl to palpation  CV: RRR, no murmurs, rubs or gallops, no edema  GI: no ABD mass or tenderness, no HSM  : not tested  Lymph: no cervical, supraclavicular, inguinal or epitrochlear nodes  MS: All TMJ, neck, shoulder, elbow, wrist, MCP/PIP/DIP, spine, " hip, knee, ankle, and foot MTP/IP joints were examined and  found without active synovitis or deformity. She has mild hypermobility with extension at elbows and knees, but Beighton score is low. No dactylitis,  tenosynovitis, enthesopathy   Skin: no nail pitting, alopecia, rash, nodules or lesions  Neuro: nl cranial nerves, strength, sensation, DTRs.   Psych: nl orientation, affect.         Data:     Lab Results   Component Value Date    WBC 5.5 07/24/2018    WBC 8.0 07/06/2018    WBC 6.1 10/20/2017    HGB 12.1 07/24/2018    HGB 12.8 07/06/2018    HGB 13.4 10/20/2017    HCT 35.9 07/24/2018    HCT 37.1 07/06/2018    HCT 38.8 10/20/2017    MCV 87 07/24/2018    MCV 87 07/06/2018    MCV 90 10/20/2017     07/24/2018     07/06/2018     10/20/2017     Lab Results   Component Value Date    BUN 9 07/24/2018    BUN 10 07/06/2018    BUN 9 03/02/2018     No components found for: SEDRATE  Lab Results   Component Value Date    TSH 1.84 04/26/2018    TSH 2.08 10/05/2017     Lab Results   Component Value Date    AST 26 07/06/2018    AST 35 03/02/2018    AST 36 07/24/2017    ALT 30 07/06/2018    ALT 38 03/02/2018    ALT 56 (H) 07/24/2017    ALKPHOS 144 07/06/2018    ALKPHOS 132 03/02/2018    ALKPHOS 148 07/24/2017     Reviewed Rheumatology lab flowsheet    Adalberto Shipley

## 2018-10-25 NOTE — MR AVS SNAPSHOT
After Visit Summary   10/25/2018    Ada Welch    MRN: [id number]           Patient Information     Date Of Birth          1970        Visit Information        Provider Department      10/25/2018 5:30 PM Adalberto Shipley MD [facility] Rheumatology        Today's Diagnoses     Tom-Danlos syndrome    -  1    Arthritis involving multiple sites        Pain syndrome, chronic           Follow-ups after your visit        Follow-up notes from your care team     Return test results.      Your next 10 appointments already scheduled     Nov 30, 2018 12:20 PM CST   (Arrive by 12:05 PM)   New Weight Management Visit with Sury Grossman MD   [facility] ([facility])    [address] [place] [id number]   [phone]            Nov 30, 2018  1:30 PM CST   (Arrive by 1:15 PM)   New Weight Management Visit with Jolene España RD   [facility] ([facility])    [address] [place] [address]   [phone]            Dec 03, 2018 10:25 AM CST   (Arrive by 10:10 AM)   Return Visit with Shilpa RAMIREZ MD   [facility] ([facility])    [address] [place] [address]   [phone]            Dec 03, 2018  1:15 PM CST   (Arrive by 1:00 PM)   New Allergy with Vahe Rodriguez MD   [facility] ([facility])    [address] [place] [address]  [address] [place] [address]   [phone]           Do not take anti-histamines or Zantac for seven day prior to your appointment.            Dec 03, 2018  6:30 PM CST   (Arrive by 6:15 PM)   Return Visit with Zoe Barahona MD   [facility] ([facility])    [address] [place] [address]   [phone]             "Dec 06, 2018 11:40 AM CST   (Arrive by 11:25 AM)   DARIA For Women Only with Maravilla Do Daxa, PT   [facility] ([facility]  )    [address] [place] [address] [id number]  [place] [address] [phone]            Dec 13, 2018  9:40 AM CST   DARIA For Women Only with Maravilla Do Daxa, PT   [facility] ([facility] [place]  )    [address] [place] [address] [id number]  [place] [id number] [phone]              Who to contact     If you have questions or need follow up information about today's clinic visit or your schedule please contact [facility] RHEUMATOLOGY directly at [phone].  Normal or non-critical lab and imaging results will be communicated to you by MyGoodPointshart, letter or phone within 4 business days after the clinic has received the results. If you do not hear from us within 7 days, please contact the clinic through spigitt or phone. If you have a critical or abnormal lab result, we will notify you by phone as soon as possible.  Submit refill requests through Service[employer]ems or call your pharmacy and they will forward the refill request to us. Please allow 3 business days for your refill to be completed.          Additional Information About Your Visit        ServiceGems Information     [employer] gives you secure access to your electronic health record. If you see a primary care provider, you can also send messages to your care team and make appointments. If you have questions, please call your primary care clinic.  If you do not have a primary care provider, please call [phone] and they will assist you.        Care EveryWhere ID     This is your Care EveryWhere ID. This could be used by other organizations to access your [place] medical records  [id number]        Your Vitals Were     Pulse Temperature Respirations Height Pulse Oximetry BMI (Body Mass Index)    75 98.1  F (36.7  C) (Oral) 16 1.702 m (5' 7\") 98% 34.93 kg/m2       Blood Pressure from " Last 3 Encounters:   11/08/18 119/69   10/29/18 108/73   10/25/18 110/77    Weight from Last 3 Encounters:   10/29/18 101.2 kg (223 lb 1.7 oz)   10/25/18 101.2 kg (223 lb)   09/20/18 97.1 kg (214 lb)                 Today's Medication Changes          These changes are accurate as of 10/25/18 11:59 PM.  If you have any questions, ask your nurse or doctor.               These medicines have changed or have updated prescriptions.        Dose/Directions    OLANZapine 5 MG tablet   Commonly known as:  zyPREXA   This may have changed:  when to take this   Used for:  Other migraine with status migrainosus, intractable, Intractable chronic migraine without aura and without status migrainosus        Dose:  5 mg   Take 1 tablet (5 mg) by mouth daily as needed (headache pain, nausea)   Quantity:  30 tablet   Refills:  3                Primary Care Provider Office Phone # Fax #    Ellyn Rivera [phone]       [address] [address]        Equal Access to Services     [facility]: Hadii kia romoo Sonegrito, waaxda luqadaha, qaybta kaalmada eileen, odette dykes . So [facility] [phone].    ATENCIÓN: Si habla español, tiene a sweeney disposición servicios gratuitos de asistencia lingüística. Llame al [phone].    We comply with applicable federal civil rights laws and [place] laws. We do not discriminate on the basis of race, color, national origin, age, disability, sex, sexual orientation, or gender identity.            Thank you!     Thank you for choosing [facility] RHEUMATOLOG[facility]  for your care. Our goal is always to provide you with excellent care. Hearing back from our patients is one way we can continue to improve our services. Please take a few minutes to complete the written survey that you may receive in the mail after your visit with us. Thank you!             Your Updated Medication List - Protect others around you: Learn how to safely  use, store and throw away your medicines at www.disposemymeds.org.          This list is accurate as of 10/25/18 11:59 PM.  Always use your most recent med list.                   Brand Name Dispense Instructions for use Diagnosis    ACETAMINOPHEN PO      Take 1,000 mg by mouth every 6 hours as needed for pain Take with tramadol        ADVAIR DISKUS 250-50 MCG/DOSE diskus inhaler   Generic drug:  fluticasone-salmeterol      Inhale 1 puff into the lungs 2 times daily as needed        albuterol 108 (90 Base) MCG/ACT inhaler    PROAIR HFA/PROVENTIL HFA/VENTOLIN HFA     Inhale 2 puffs into the lungs every 6 hours as needed        AMITRIPTYLINE HCL PO      Take 50 mg by mouth At Bedtime        butalbital-acetaminophen-caffeine -40 MG per tablet    FIORICET/ESGIC     TAKE ONE TABLET BY MOUTH EVERY 4 HOURS AS NEEDED        CEFALY KIT Pauly     1 Device    1 Device daily as needed    Intractable chronic migraine without aura and without status migrainosus       cetirizine 10 MG tablet    zyrTEC     Take 10 mg by mouth daily        COMPRESSION STOCKINGS     3 each    1 each daily 20-30 mmHg Thigh Length measure and fit, color and style per patient preference. Doff n jose per need.    Orthostatic hypotension       DULOXETINE HCL PO      Take 30 mg by mouth 2 times daily .        eletriptan 20 MG tablet    RELPAX    18 tablet    Take 1 tablet (20 mg) by mouth at onset of headache for migraine May repeat in 2 hours. Max 4 tablets/24 hours.    Chronic daily headache, Intractable migraine without aura and with status migrainosus       EPINEPHrine 0.3 MG/0.3ML injection 2-pack    EPIPEN/ADRENACLICK/or ANY BX GENERIC EQUIV     Inject 0.3 mg into the muscle        erenumab-aooe 70 MG/ML injection    AIMOVIG 140 DOSE    2 pen    Inject 2 mLs (140 mg) Subcutaneous every 30 days    Intractable chronic migraine without aura and without status migrainosus       famotidine 20 MG tablet    PEPCID    90 tablet    Take 1 tablet (20  mg) by mouth At Bedtime    Hiatal hernia, Mast cell disorder       fluticasone 50 MCG/ACT spray    FLONASE    48 mL    INSTILL 2 SPRAYS INTO BOTH NOSTRILS DAILY    Chronic rhinitis, unspecified type       levothyroxine 25 MCG tablet    SYNTHROID    90 tablet    Take 1 tablet (25 mcg) by mouth daily    Hypothyroidism, unspecified type       linaclotide 145 MCG capsule    LINZESS    30 capsule    Take 1 capsule (145 mcg) by mouth every morning (before breakfast) Last refill until GI clinic follow up.    Chronic idiopathic constipation       medical cannabis (Patient's own supply.  Not a prescription)      Take 1 Dose by mouth See Admin Instructions (This is NOT a prescription, and does not certify that the patient has a qualifying medical condition for medical cannabis.  The purpose of this order is  to document that the patient reports taking medical cannabis.)        metoclopramide 5 MG tablet    REGLAN    180 tablet    Take 1-2 tablets (5-10 mg) by mouth every 6 hours as needed    Intractable chronic migraine without aura and without status migrainosus       MULTIVITAMIN PO      Take 1 tablet by mouth daily        OLANZapine 5 MG tablet    zyPREXA    30 tablet    Take 1 tablet (5 mg) by mouth daily as needed (headache pain, nausea)    Other migraine with status migrainosus, intractable, Intractable chronic migraine without aura and without status migrainosus       ondansetron 4 MG ODT tab    ZOFRAN-ODT    60 tablet    Take 1-2 tablets (4-8 mg) by mouth every 12 hours as needed for nausea    Migraine without status migrainosus, not intractable, unspecified migraine type       oxyCODONE IR 5 MG tablet    ROXICODONE    15 tablet    Take 0.5 tablets (2.5 mg) by mouth every 6 hours as needed for severe pain    Postlaminectomy syndrome       PROBIOTIC DAILY PO      Take 2 packets by mouth every morning        propranolol HCl SR Beads 120 MG 24 HR ER capsule    INDREAL XL    30 capsule    Take 120 mg by mouth daily     Sinus tachycardia       rizatriptan 5 MG ODT tab    MAXALT-MLT     TAKE 1-2 TABLETS (5-10 MG) BY MOUTH AT ONSET OF HEADACHE FOR MIGRAINE (MAX 30 MG IN 24 HOURS)        SUMAtriptan 6 MG/0.5ML pen injector kit    IMITREX STATDOSE    2 kit    Inject 0.5 ml (6 mg) subcutaneously at onset of migraine, May repeat in 1 hour , Max 12 mg/24 hrs    Chronic migraine without aura without status migrainosus, not intractable       vitamin B complex with vitamin C Tabs tablet      Take 1 tablet by mouth daily        VITAMIN D (CHOLECALCIFEROL) PO      Take 2,000 Units by mouth daily        ZONISAMIDE PO      Take 75 mg by mouth daily

## 2018-10-25 NOTE — LETTER
10/25/2018       RE: Ada Welch  [address]  [place] [address]     Dear Colleague,    Thank you for referring your patient, Ada Welch, to the [facility] RHEUMATOLOGY at [facility] Please see a copy of my visit note below.    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    She has multiple musculoskeletal complaints and findings on radiography of degenerative changes in the spine particularly, and lumbar disc disease. She has had extensive lumbar spine surgery.     She has a chronic pain syndrome per above.    2. Multiple medical problems as noted in the medical record.    PLAN: I had a long discussion with the patient about her known ED hypermobility type, but with multiple symptomatic musculoskeletal problems leading to chronic pain.  She is aware there is no specific therapy for ED.    1.  In order to confirm prior assessments that she does not have a concomitant inflammatory arthritis or autoimmune disease, testing is ordered today per EPIC orders.  2.  Write with results and recommendations  3.  Unless she has a new diagnosis of a systemic rheumatic disease requiring immunomodulatory therapy, I will not plan to see her in followup as I have nothing else to offer from a therapeutic option perspective and she is seeing multiple other providers.    Adalberto Shipley MD MACR      60 minute appointment face to face, 35 minutes counseling and coordination of care, with 30 minutes additional record review.          History of Present Illness:   Ada Welch is a 47 year old female who presents for evaluation of musculoskeletal complaints in setting of known diagnosis of Tom Danlos syndrome.   EPIC and Care Everywhere reviewed. Referred by Dr. Leach, Hematology.     She has previously been followed in [place], including by her rheumatologist Dr. Pascual, but wishes to consolidate all needed specialist care here.     She has documented Tom-Danlos syndrome of the hypermobility type, as do several relatives.    She has associated or concomitant musculoskeletal diagnoses of spine facet joint arthritis at multiple levels, lumbar degenerative disc disease, spondylolisthesis of lumbar area, peripheral joint and periarticular symptoms, and chronic pain. She is s/p spine surgery as noted in record.    She has daily pain in multiple areas that impact her ADLs.  She is on a complex medical regimen for pain and managed in the Pain Clinic by Dr. Martinez, and PCC with her PCP Dr. Rivera.  She is on medical cannabis as well.     Just within the past 60 days she has seen Internal Medicine, Pain Clinic, GI, ENT, Urology, Neurology, Allergy, Hematology, Cardiology, PM&R.    She denies history of joint dislocations. No history of vascular emergency, intraabdominal issues requiring surgery.    Her rheumatologic ROS is negative for photosensitivity, kidney disease, seizures.        Review of Systems:   Review Of Systems  Skin: negative  Eyes: negative  Ears/Nose/Throat: negative  Respiratory: No shortness of breath, dyspnea on exertion, cough, or hemoptysis  Cardiovascular: negative  Gastrointestinal: negative  Genitourinary: negative  Musculoskeletal: see HPI  Neurologic: negative  Psychiatric: negative  Hematologic/Lymphatic/Immunologic: negative  Endocrine: negative          Past Medical History:     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     Chronic  sinusitis 00/2007    Diagnosed with chronic pansinusitis 2016     Chronic tonsillitis 1980    Had tonsils removed 02/1981, rheumatic fever     Tom-Danlos disease      Eosinophilic esophagitis 08/2018     Gastroesophageal reflux disease 3/1/2017    I have large hiatal hernia     Hernia, abdominal 10/2016    Hiatal Hernia     Hiatal hernia 2016    Have adeline hiatel hernia     Hoarseness Unsure    It comes and goes     IBS (irritable bowel syndrome) with constipation     improved on Linzess     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     Migraines 1/1/2007    Unsure of exact date     Nasal polyps 2013    Were revoved 03/2017, benign     Other nervous system complications 1/2014    Autonomic nervous system disorder, neuralgia, neuropathy     Pneumonia Unsure    Have had pneumonia several times     Swelling, mass, or lump in head and neck 08/2016    Lymph node has been growing for last year     Thyroid disease 01/01/2008       Patient Active Problem List    Diagnosis Date Noted     Intractable migraine with status migrainosus 07/24/2018     Priority: Medium     Migraine 07/07/2018     Priority: Medium     Autonomic dysfunction 06/07/2018     Priority: Medium     Low back pain of multiple sites of spine with sciatica 12/09/2017     Priority: Medium     Intractable chronic migraine without aura and without status migrainosus 12/09/2017     Priority: Medium     Non compliance with medical treatment 12/09/2017     Priority: Medium     Obese 12/09/2017     Priority: Medium     Slow transit constipation 08/30/2017     Priority: Medium     Urinary urgency 08/30/2017     Priority: Medium     Nocturia 08/30/2017     Priority: Medium     Cardiac device in situ- [employer] Implanted Loop Recorder (ILR) 08/30/2017     Priority: Medium     Cervicalgia 08/25/2017     Priority: Medium     Headache 07/25/2017     Priority: Medium     Postlaminectomy syndrome, lumbar region 06/14/2017     Priority: Medium      Lymphadenopathy of left cervical region 03/27/2017     Priority: Medium     Easy bruising 10/13/2016     Priority: Medium     Head and neck lymphadenopathy 10/13/2016     Priority: Medium     Hepatomegaly 10/13/2016     Priority: Medium     Liver lesion 10/13/2016     Priority: Medium     Pain syndrome, chronic 09/05/2016     Priority: Medium     s/p TLIF L4-5,L5-S1 with solid arthrodesis 09/05/2016     Priority: Medium     Tom-Danlos syndrome 09/05/2016     Priority: Medium     Acquired hypothyroidism 07/22/2016     Priority: Medium     Anxiety 07/22/2016     Priority: Medium     Opioid dependence (H) 07/22/2016     Priority: Medium     Overview:   Treatment Agreement       Essential hypertension with goal blood pressure less than 140/90 07/22/2016     Priority: Medium     Low back pain 07/12/2016     Priority: Medium     Lumbar radiculopathy 02/18/2016     Priority: Medium     Encounter for genetic counseling and testing 01/05/2016     Priority: Medium     Overview:     [employer] Aortopathy Comprehensive Panel ordered 1/5/2016.       Sinus tachycardia 09/29/2015     Priority: Medium     Astigmatism 12/12/2014     Priority: Medium     Keratoconjunctivitis sicca (H) 12/12/2014     Priority: Medium     Presbyopia 12/12/2014     Priority: Medium     Opioid type dependence, continuous (H) 10/09/2014     Priority: Medium     Other acute postoperative pain 10/09/2014     Priority: Medium     Anxiety disorder 08/21/2014     Priority: Medium     Depression 08/21/2014     Priority: Medium     Pain disorder associated with psychological and physical factors 08/21/2014     Priority: Medium     Low back pain radiating to both legs 07/08/2014     Priority: Medium     Arthritis of facet joints at multiple vertebral levels (H) 01/01/2014     Priority: Medium     Spondylolisthesis of lumbar region 12/10/2013     Priority: Medium     Lumbar degenerative disc disease 11/21/2013     Priority: Medium     DNS (deviated nasal  septum) 10/09/2012     Priority: Medium     Nasal turbinate hypertrophy 10/09/2012     Priority: Medium     Dysuria 07/12/2011     Priority: Medium     Chronic sinusitis with recurrent bronchitis 02/01/2007     Priority: Medium     Allergic rhinitis 01/09/2007     Priority: Medium     Cyst of fallopian tube 12/28/1996     Priority: Medium     Arthritis involving multiple sites 01/01/1986     Priority: Medium     Uterine endometriosis 01/01/1986     Priority: Medium             Past Surgical History:     Past Surgical History:   Procedure Laterality Date     ADENOIDECTOMY  1981     AS REPAIR OF NASAL SEPTUM       BACK SURGERY  12/09/2013  10/01/2014    Spinal fusion L4-S1, L5 moving 5/8ths in. Remove screws/rods     BIOPSY  01/01/2008 & 3/2017    mole biopsy, lymph node biopsy     COLONOSCOPY  3/2017    Colonoscopy and GI     ESOPHAGOSCOPY, GASTROSCOPY, DUODENOSCOPY (EGD), COMBINED N/A 8/3/2018    Procedure: COMBINED ESOPHAGOSCOPY, GASTROSCOPY, DUODENOSCOPY (EGD), BIOPSY SINGLE OR MULTIPLE;;  Surgeon: Juan Woodson MD;  Location:  GI     ESOPHAGOSCOPY, GASTROSCOPY, DUODENOSCOPY (EGD), COMBINED N/A 10/22/2018    Procedure: COMBINED ESOPHAGOSCOPY, GASTROSCOPY, DUODENOSCOPY (EGD), BIOPSY SINGLE OR MULTIPLE;  Surgeon: Sadia Carolina MD;  Location:  GI     NASAL/SINUS POLYPECTOMY  2017    Polyps were benign     TONSILLECTOMY       TONSILLECTOMY  1981     TUBAL LIGATION               Social History:     Social History   Substance Use Topics     Smoking status: Former Smoker     Packs/day: 0.20     Years: 10.00     Types: Cigarettes     Start date: 1/1/1991     Quit date: 12/1/2013     Smokeless tobacco: Never Used      Comment: I somked on and off during specified dates.     Alcohol use Yes      Comment: occasional             Family History:     Family History   Problem Relation Age of Onset     Connective Tissue Disorder Mother      eds     Connective Tissue Disorder Sister      eds     Cancer Father      Spinal  tumor grew into spinal cord, went in brain     Migraines Father      Diabetes Maternal Grandmother      Elderly diabetes     HEART DISEASE Maternal Grandmother      Hypertension Maternal Grandmother      Diabetes Paternal Grandmother      Elderly diabetes     Diabetes Paternal Grandfather      Elderly diabetes     Asthma Sister      Pediatric Asthma     Migraines Sister      Asthma Son      Pediatric Asthma     Thyroid Disease Sister      ,     Migraines Sister      Migraines Sister      Breast Cancer No family hx of      EDS per above.  No FH of autoimmune disease       Allergies:     Allergies   Allergen Reactions     Bee Venom Anaphylaxis, Difficulty breathing, Palpitations, Shortness Of Breath and Visual Disturbance     Patient does carry Epi-Pen     No Clinical Screening - See Comments Anaphylaxis     Chicken-Derived Products (Egg) Diarrhea and Nausea     Patient will self monitor  Other reaction(s): Nausea  Patient will self monitor  Other reaction(s): GI intolerance     Compazine [Prochlorperazine] Other (See Comments)     Extreme jittery     Food      Milk     Gabapentin      Gluten Meal Other (See Comments)     Pregabalin Other (See Comments)     Topiramate      Wheat Diarrhea     Wheat Bran Nausea     Patient will self monitor  Other reaction(s): Nausea  Patient will self monitor             Medications:     Current Outpatient Prescriptions   Medication Sig Dispense Refill     ACETAMINOPHEN PO Take 1,000 mg by mouth every 6 hours as needed for pain Take with tramadol       albuterol (PROAIR HFA/PROVENTIL HFA/VENTOLIN HFA) 108 (90 BASE) MCG/ACT Inhaler Inhale 2 puffs into the lungs every 6 hours as needed        AMITRIPTYLINE HCL PO Take 40 mg by mouth daily for two weeks, then increase to 50 mg daily.       buprenorphine (BUTRANS) 10 MCG/HR WK patch Place 1 patch onto the skin every 7 days 4 patch 1     butalbital-acetaminophen-caffeine (FIORICET/ESGIC) -40 MG per tablet TAKE ONE TABLET BY MOUTH  EVERY 4 HOURS AS NEEDED  0     cetirizine (ZYRTEC) 10 MG tablet Take 10 mg by mouth daily        COMPRESSION STOCKINGS 1 each daily 20-30 mmHg Thigh Length measure and fit, color and style per patient preference. Doshar garcia per need. 3 each 3     DULOXETINE HCL PO Take 60 mg by mouth in the morning and 30 mg in the evening.       eletriptan (RELPAX) 20 MG tablet Take 1 tablet (20 mg) by mouth at onset of headache for migraine May repeat in 2 hours. Max 4 tablets/24 hours. 18 tablet 1     EPINEPHrine (EPIPEN/ADRENACLICK/OR ANY BX GENERIC EQUIV) 0.3 MG/0.3ML injection 2-pack Inject 0.3 mg into the muscle       erenumab-aooe (AIMOVIG 140 DOSE) 70 MG/ML injection Inject 2 mLs (140 mg) Subcutaneous every 30 days 2 pen 11     famotidine (PEPCID) 20 MG tablet Take 1 tablet (20 mg) by mouth At Bedtime 90 tablet 1     fludrocortisone (FLORINEF) 0.1 MG tablet Take 2 tablets (0.2 mg) by mouth daily (Patient not taking: Reported on 9/20/2018) 180 tablet 3     fluticasone (FLONASE) 50 MCG/ACT spray INSTILL 2 SPRAYS INTO BOTH NOSTRILS DAILY 48 mL 3     fluticasone-salmeterol (ADVAIR DISKUS) 250-50 MCG/DOSE diskus inhaler Inhale 1 puff into the lungs 2 times daily as needed        levothyroxine (SYNTHROID) 25 MCG tablet Take 1 tablet (25 mcg) by mouth daily 90 tablet 3     linaclotide (LINZESS) 145 MCG capsule Take 1 capsule (145 mcg) by mouth every morning (before breakfast) Last refill until GI clinic follow up. 30 capsule 2     medical cannabis (Patient's own supply.  Not a prescription) Take 1 Dose by mouth See Admin Instructions (This is NOT a prescription, and does not certify that the patient has a qualifying medical condition for medical cannabis.  The purpose of this order is  to document that the patient reports taking medical cannabis.)       metoclopramide (REGLAN) 5 MG tablet Take 1-2 tablets (5-10 mg) by mouth every 6 hours as needed 180 tablet 1     Multiple Vitamins-Minerals (MULTIVITAMIN PO) Take 1 tablet by  "mouth daily        Nerve Stimulator (CEFALY KIT) KAROLINE 1 Device daily as needed 1 Device 0     OLANZapine (ZYPREXA) 5 MG tablet Take 1 tablet (5 mg) by mouth daily as needed (headache pain, nausea) 30 tablet 3     omeprazole (PRILOSEC) 40 MG capsule Take 1 capsule (40 mg) by mouth 2 times daily as needed 60 capsule 3     ondansetron (ZOFRAN-ODT) 4 MG ODT tab Take 1-2 tablets (4-8 mg) by mouth every 12 hours as needed for nausea 60 tablet 1     oxyCODONE IR (ROXICODONE) 5 MG tablet Take 0.5 tablets (2.5 mg) by mouth every 6 hours as needed for severe pain 15 tablet 0     Probiotic Product (PROBIOTIC DAILY PO) Take 2 packets by mouth every morning        Propranolol HCl SR Beads 120 MG CP24 Take 120 mg by mouth daily 30 capsule 11     rizatriptan (MAXALT-MLT) 5 MG ODT tab TAKE 1-2 TABLETS (5-10 MG) BY MOUTH AT ONSET OF HEADACHE FOR MIGRAINE (MAX 30 MG IN 24 HOURS)  6     SUMAtriptan (IMITREX STATDOSE) 6 MG/0.5ML pen injector kit Inject 0.5 ml (6 mg) subcutaneously at onset of migraine, May repeat in 1 hour , Max 12 mg/24 hrs 2 kit 1     tiZANidine (ZANAFLEX) 4 MG tablet Take 1 tablet (4 mg) by mouth 4 times daily as needed for muscle spasms 120 tablet 3     traMADol (ULTRAM) 50 MG tablet Take 1 tablet (50 mg) by mouth every 6 hours as needed for severe pain 60 tablet 1     vitamin B complex with vitamin C (VITAMIN  B COMPLEX) TABS tablet Take 1 tablet by mouth daily       VITAMIN D, CHOLECALCIFEROL, PO Take 2,000 Units by mouth daily       ZONISAMIDE PO Take 75 mg by mouth daily              Physical Exam:   /77 (BP Location: Right arm, Patient Position: Sitting, Cuff Size: Adult Large)  Pulse 75  Temp 98.1  F (36.7  C) (Oral)  Resp 16  Ht 1.702 m (5' 7\")  Wt 101.2 kg (223 lb)  SpO2 98%  BMI 34.93 kg/m2    Constitutional: WD-WN-WG cooperative  Eyes: nl EOM, conjunctiva, sclera  ENT: nl external ears, nose, throat  No mucous membrane lesions, normal saliva pool  Neck: no mass or thyroid enlargement  Resp: " lungs clear to auscultation, nl to palpation  CV: RRR, no murmurs, rubs or gallops, no edema  GI: no ABD mass or tenderness, no HSM  : not tested  Lymph: no cervical, supraclavicular, inguinal or epitrochlear nodes  MS: All TMJ, neck, shoulder, elbow, wrist, MCP/PIP/DIP, spine, hip, knee, ankle, and foot MTP/IP joints were examined and  found without active synovitis or deformity. She has mild hypermobility with extension at elbows and knees, but Beighton score is low. No dactylitis,  tenosynovitis, enthesopathy   Skin: no nail pitting, alopecia, rash, nodules or lesions  Neuro: nl cranial nerves, strength, sensation, DTRs.   Psych: nl orientation, affect.         Data:     Lab Results   Component Value Date    WBC 5.5 07/24/2018    WBC 8.0 07/06/2018    WBC 6.1 10/20/2017    HGB 12.1 07/24/2018    HGB 12.8 07/06/2018    HGB 13.4 10/20/2017    HCT 35.9 07/24/2018    HCT 37.1 07/06/2018    HCT 38.8 10/20/2017    MCV 87 07/24/2018    MCV 87 07/06/2018    MCV 90 10/20/2017     07/24/2018     07/06/2018     10/20/2017     Lab Results   Component Value Date    BUN 9 07/24/2018    BUN 10 07/06/2018    BUN 9 03/02/2018     No components found for: SEDRATE  Lab Results   Component Value Date    TSH 1.84 04/26/2018    TSH 2.08 10/05/2017     Lab Results   Component Value Date    AST 26 07/06/2018    AST 35 03/02/2018    AST 36 07/24/2017    ALT 30 07/06/2018    ALT 38 03/02/2018    ALT 56 (H) 07/24/2017    ALKPHOS 144 07/06/2018    ALKPHOS 132 03/02/2018    ALKPHOS 148 07/24/2017     Reviewed Rheumatology lab flowsheet      Adalberto Shipley MD

## 2018-10-25 NOTE — TELEPHONE ENCOUNTER
[facility] Call Center    Phone Message    May a detailed message be left on voicemail: yes    Reason for Call: Other: Pt called and would like to discuss about her soon-to-be  Medical candidate registry card. Pt states Dr. Rivera helped her set up the card. Please call back pt.      Action Taken: Message routed to:  [facility] ([facility] SHAUNA

## 2018-10-25 NOTE — LETTER
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    She has multiple musculoskeletal complaints and findings on radiography of degenerative changes in the spine particularly, and lumbar disc disease. She has had extensive lumbar spine surgery.     She has a chronic pain syndrome per above.    2. Multiple medical problems as noted in the medical record.    PLAN: I had a long discussion with the patient about her known ED hypermobility type, but with multiple symptomatic musculoskeletal problems leading to chronic pain.  She is aware there is no specific therapy for ED.    1.  In order to confirm prior assessments that she does not have a concomitant inflammatory arthritis or autoimmune disease, testing is ordered today per EPIC orders.  2.  Write with results and recommendations  3.  Unless she has a new diagnosis of a systemic rheumatic disease requiring immunomodulatory therapy, I will not plan to see her in followup as I have nothing else to offer from a therapeutic option perspective and she is seeing multiple other providers.    Adalberto Shipley MD MACR      60 minute appointment face to face, 35 minutes counseling and coordination of care, with 30 minutes additional record review.          History of Present Illness:   Ada Welch is a 47 year old female who presents for evaluation of musculoskeletal complaints in setting of known diagnosis of Tom Danlos syndrome.  EPIC and Care Everywhere reviewed. Referred by Dr. Leach, Hematology.     She has previously been followed in [place], including by her rheumatologist Dr. Pascual, but wishes to consolidate all  needed specialist care here.     She has documented Tom-Danlos syndrome of the hypermobility type, as do several relatives.    She has associated or concomitant musculoskeletal diagnoses of spine facet joint arthritis at multiple levels, lumbar degenerative disc disease, spondylolisthesis of lumbar area, peripheral joint and periarticular symptoms, and chronic pain. She is s/p spine surgery as noted in record.    She has daily pain in multiple areas that impact her ADLs.  She is on a complex medical regimen for pain and managed in the Pain Clinic by Dr. Martinez, and PCC with her PCP Dr. Rivera.  She is on medical cannabis as well.     Just within the past 60 days she has seen Internal Medicine, Pain Clinic, GI, ENT, Urology, Neurology, Allergy, Hematology, Cardiology, PM&R.    She denies history of joint dislocations. No history of vascular emergency, intraabdominal issues requiring surgery.    Her rheumatologic ROS is negative for photosensitivity, kidney disease, seizures.        Review of Systems:   Review Of Systems  Skin: negative  Eyes: negative  Ears/Nose/Throat: negative  Respiratory: No shortness of breath, dyspnea on exertion, cough, or hemoptysis  Cardiovascular: negative  Gastrointestinal: negative  Genitourinary: negative  Musculoskeletal: see HPI  Neurologic: negative  Psychiatric: negative  Hematologic/Lymphatic/Immunologic: negative  Endocrine: negative          Past Medical History:     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     Chronic sinusitis 00/2007    Diagnosed with chronic pansinusitis 2016     Chronic tonsillitis 1980    Had tonsils removed 02/1981, rheumatic fever     Tom-Danlos disease      Eosinophilic esophagitis  08/2018     Gastroesophageal reflux disease 3/1/2017    I have large hiatal hernia     Hernia, abdominal 10/2016    Hiatal Hernia     Hiatal hernia 2016    Have adeline hiatel hernia     Hoarseness Unsure    It comes and goes     IBS (irritable bowel syndrome) with constipation     improved on Linzess     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     Migraines 1/1/2007    Unsure of exact date     Nasal polyps 2013    Were revoved 03/2017, benign     Other nervous system complications 1/2014    Autonomic nervous system disorder, neuralgia, neuropathy     Pneumonia Unsure    Have had pneumonia several times     Swelling, mass, or lump in head and neck 08/2016    Lymph node has been growing for last year     Thyroid disease 01/01/2008       Patient Active Problem List    Diagnosis Date Noted     Intractable migraine with status migrainosus 07/24/2018     Priority: Medium     Migraine 07/07/2018     Priority: Medium     Autonomic dysfunction 06/07/2018     Priority: Medium     Low back pain of multiple sites of spine with sciatica 12/09/2017     Priority: Medium     Intractable chronic migraine without aura and without status migrainosus 12/09/2017     Priority: Medium     Non compliance with medical treatment 12/09/2017     Priority: Medium     Obese 12/09/2017     Priority: Medium     Slow transit constipation 08/30/2017     Priority: Medium     Urinary urgency 08/30/2017     Priority: Medium     Nocturia 08/30/2017     Priority: Medium     Cardiac device in situ- [employer] Implanted Loop Recorder (ILR) 08/30/2017     Priority: Medium     Cervicalgia 08/25/2017     Priority: Medium     Headache 07/25/2017     Priority: Medium     Postlaminectomy syndrome, lumbar region 06/14/2017     Priority: Medium     Lymphadenopathy of left cervical region 03/27/2017     Priority: Medium     Easy bruising 10/13/2016     Priority: Medium     Head and neck lymphadenopathy 10/13/2016     Priority: Medium      Hepatomegaly 10/13/2016     Priority: Medium     Liver lesion 10/13/2016     Priority: Medium     Pain syndrome, chronic 09/05/2016     Priority: Medium     s/p TLIF L4-5,L5-S1 with solid arthrodesis 09/05/2016     Priority: Medium     Tom-Danlos syndrome 09/05/2016     Priority: Medium     Acquired hypothyroidism 07/22/2016     Priority: Medium     Anxiety 07/22/2016     Priority: Medium     Opioid dependence (H) 07/22/2016     Priority: Medium     Overview:   Treatment Agreement       Essential hypertension with goal blood pressure less than 140/90 07/22/2016     Priority: Medium     Low back pain 07/12/2016     Priority: Medium     Lumbar radiculopathy 02/18/2016     Priority: Medium     Encounter for genetic counseling and testing 01/05/2016     Priority: Medium     Overview:     [employer] Aortopathy Comprehensive Panel ordered 1/5/2016.       Sinus tachycardia 09/29/2015     Priority: Medium     Astigmatism 12/12/2014     Priority: Medium     Keratoconjunctivitis sicca (H) 12/12/2014     Priority: Medium     Presbyopia 12/12/2014     Priority: Medium     Opioid type dependence, continuous (H) 10/09/2014     Priority: Medium     Other acute postoperative pain 10/09/2014     Priority: Medium     Anxiety disorder 08/21/2014     Priority: Medium     Depression 08/21/2014     Priority: Medium     Pain disorder associated with psychological and physical factors 08/21/2014     Priority: Medium     Low back pain radiating to both legs 07/08/2014     Priority: Medium     Arthritis of facet joints at multiple vertebral levels (H) 01/01/2014     Priority: Medium     Spondylolisthesis of lumbar region 12/10/2013     Priority: Medium     Lumbar degenerative disc disease 11/21/2013     Priority: Medium     DNS (deviated nasal septum) 10/09/2012     Priority: Medium     Nasal turbinate hypertrophy 10/09/2012     Priority: Medium     Dysuria 07/12/2011     Priority: Medium     Chronic sinusitis with recurrent bronchitis  02/01/2007     Priority: Medium     Allergic rhinitis 01/09/2007     Priority: Medium     Cyst of fallopian tube 12/28/1996     Priority: Medium     Arthritis involving multiple sites 01/01/1986     Priority: Medium     Uterine endometriosis 01/01/1986     Priority: Medium             Past Surgical History:     Past Surgical History:   Procedure Laterality Date     ADENOIDECTOMY  1981     AS REPAIR OF NASAL SEPTUM       BACK SURGERY  12/09/2013  10/01/2014    Spinal fusion L4-S1, L5 moving 5/8ths in. Remove screws/rods     BIOPSY  01/01/2008 & 3/2017    mole biopsy, lymph node biopsy     COLONOSCOPY  3/2017    Colonoscopy and GI     ESOPHAGOSCOPY, GASTROSCOPY, DUODENOSCOPY (EGD), COMBINED N/A 8/3/2018    Procedure: COMBINED ESOPHAGOSCOPY, GASTROSCOPY, DUODENOSCOPY (EGD), BIOPSY SINGLE OR MULTIPLE;;  Surgeon: Juan Woodson MD;  Location:  GI     ESOPHAGOSCOPY, GASTROSCOPY, DUODENOSCOPY (EGD), COMBINED N/A 10/22/2018    Procedure: COMBINED ESOPHAGOSCOPY, GASTROSCOPY, DUODENOSCOPY (EGD), BIOPSY SINGLE OR MULTIPLE;  Surgeon: Sadia Carolina MD;  Location:  GI     NASAL/SINUS POLYPECTOMY  2017    Polyps were benign     TONSILLECTOMY       TONSILLECTOMY  1981     TUBAL LIGATION               Social History:     Social History   Substance Use Topics     Smoking status: Former Smoker     Packs/day: 0.20     Years: 10.00     Types: Cigarettes     Start date: 1/1/1991     Quit date: 12/1/2013     Smokeless tobacco: Never Used      Comment: I somked on and off during specified dates.     Alcohol use Yes      Comment: occasional             Family History:     Family History   Problem Relation Age of Onset     Connective Tissue Disorder Mother      eds     Connective Tissue Disorder Sister      eds     Cancer Father      Spinal tumor grew into spinal cord, went in brain     Migraines Father      Diabetes Maternal Grandmother      Elderly diabetes     HEART DISEASE Maternal Grandmother      Hypertension Maternal  Grandmother      Diabetes Paternal Grandmother      Elderly diabetes     Diabetes Paternal Grandfather      Elderly diabetes     Asthma Sister      Pediatric Asthma     Migraines Sister      Asthma Son      Pediatric Asthma     Thyroid Disease Sister      ,     Migraines Sister      Migraines Sister      Breast Cancer No family hx of      EDS per above.  No FH of autoimmune disease       Allergies:     Allergies   Allergen Reactions     Bee Venom Anaphylaxis, Difficulty breathing, Palpitations, Shortness Of Breath and Visual Disturbance     Patient does carry Epi-Pen     No Clinical Screening - See Comments Anaphylaxis     Chicken-Derived Products (Egg) Diarrhea and Nausea     Patient will self monitor  Other reaction(s): Nausea  Patient will self monitor  Other reaction(s): GI intolerance     Compazine [Prochlorperazine] Other (See Comments)     Extreme jittery     Food      Milk     Gabapentin      Gluten Meal Other (See Comments)     Pregabalin Other (See Comments)     Topiramate      Wheat Diarrhea     Wheat Bran Nausea     Patient will self monitor  Other reaction(s): Nausea  Patient will self monitor             Medications:     Current Outpatient Prescriptions   Medication Sig Dispense Refill     ACETAMINOPHEN PO Take 1,000 mg by mouth every 6 hours as needed for pain Take with tramadol       albuterol (PROAIR HFA/PROVENTIL HFA/VENTOLIN HFA) 108 (90 BASE) MCG/ACT Inhaler Inhale 2 puffs into the lungs every 6 hours as needed        AMITRIPTYLINE HCL PO Take 40 mg by mouth daily for two weeks, then increase to 50 mg daily.       buprenorphine (BUTRANS) 10 MCG/HR WK patch Place 1 patch onto the skin every 7 days 4 patch 1     butalbital-acetaminophen-caffeine (FIORICET/ESGIC) -40 MG per tablet TAKE ONE TABLET BY MOUTH EVERY 4 HOURS AS NEEDED  0     cetirizine (ZYRTEC) 10 MG tablet Take 10 mg by mouth daily        COMPRESSION STOCKINGS 1 each daily 20-30 mmHg Thigh Length measure and fit, color and style  per patient preference. Antolin garcia per need. 3 each 3     DULOXETINE HCL PO Take 60 mg by mouth in the morning and 30 mg in the evening.       eletriptan (RELPAX) 20 MG tablet Take 1 tablet (20 mg) by mouth at onset of headache for migraine May repeat in 2 hours. Max 4 tablets/24 hours. 18 tablet 1     EPINEPHrine (EPIPEN/ADRENACLICK/OR ANY BX GENERIC EQUIV) 0.3 MG/0.3ML injection 2-pack Inject 0.3 mg into the muscle       erenumab-aooe (AIMOVIG 140 DOSE) 70 MG/ML injection Inject 2 mLs (140 mg) Subcutaneous every 30 days 2 pen 11     famotidine (PEPCID) 20 MG tablet Take 1 tablet (20 mg) by mouth At Bedtime 90 tablet 1     fludrocortisone (FLORINEF) 0.1 MG tablet Take 2 tablets (0.2 mg) by mouth daily (Patient not taking: Reported on 9/20/2018) 180 tablet 3     fluticasone (FLONASE) 50 MCG/ACT spray INSTILL 2 SPRAYS INTO BOTH NOSTRILS DAILY 48 mL 3     fluticasone-salmeterol (ADVAIR DISKUS) 250-50 MCG/DOSE diskus inhaler Inhale 1 puff into the lungs 2 times daily as needed        levothyroxine (SYNTHROID) 25 MCG tablet Take 1 tablet (25 mcg) by mouth daily 90 tablet 3     linaclotide (LINZESS) 145 MCG capsule Take 1 capsule (145 mcg) by mouth every morning (before breakfast) Last refill until GI clinic follow up. 30 capsule 2     medical cannabis (Patient's own supply.  Not a prescription) Take 1 Dose by mouth See Admin Instructions (This is NOT a prescription, and does not certify that the patient has a qualifying medical condition for medical cannabis.  The purpose of this order is  to document that the patient reports taking medical cannabis.)       metoclopramide (REGLAN) 5 MG tablet Take 1-2 tablets (5-10 mg) by mouth every 6 hours as needed 180 tablet 1     Multiple Vitamins-Minerals (MULTIVITAMIN PO) Take 1 tablet by mouth daily        Nerve Stimulator (CEFALY KIT) KAROLINE 1 Device daily as needed 1 Device 0     OLANZapine (ZYPREXA) 5 MG tablet Take 1 tablet (5 mg) by mouth daily as needed (headache pain,  "nausea) 30 tablet 3     omeprazole (PRILOSEC) 40 MG capsule Take 1 capsule (40 mg) by mouth 2 times daily as needed 60 capsule 3     ondansetron (ZOFRAN-ODT) 4 MG ODT tab Take 1-2 tablets (4-8 mg) by mouth every 12 hours as needed for nausea 60 tablet 1     oxyCODONE IR (ROXICODONE) 5 MG tablet Take 0.5 tablets (2.5 mg) by mouth every 6 hours as needed for severe pain 15 tablet 0     Probiotic Product (PROBIOTIC DAILY PO) Take 2 packets by mouth every morning        Propranolol HCl SR Beads 120 MG CP24 Take 120 mg by mouth daily 30 capsule 11     rizatriptan (MAXALT-MLT) 5 MG ODT tab TAKE 1-2 TABLETS (5-10 MG) BY MOUTH AT ONSET OF HEADACHE FOR MIGRAINE (MAX 30 MG IN 24 HOURS)  6     SUMAtriptan (IMITREX STATDOSE) 6 MG/0.5ML pen injector kit Inject 0.5 ml (6 mg) subcutaneously at onset of migraine, May repeat in 1 hour , Max 12 mg/24 hrs 2 kit 1     tiZANidine (ZANAFLEX) 4 MG tablet Take 1 tablet (4 mg) by mouth 4 times daily as needed for muscle spasms 120 tablet 3     traMADol (ULTRAM) 50 MG tablet Take 1 tablet (50 mg) by mouth every 6 hours as needed for severe pain 60 tablet 1     vitamin B complex with vitamin C (VITAMIN  B COMPLEX) TABS tablet Take 1 tablet by mouth daily       VITAMIN D, CHOLECALCIFEROL, PO Take 2,000 Units by mouth daily       ZONISAMIDE PO Take 75 mg by mouth daily              Physical Exam:   /77 (BP Location: Right arm, Patient Position: Sitting, Cuff Size: Adult Large)  Pulse 75  Temp 98.1  F (36.7  C) (Oral)  Resp 16  Ht 1.702 m (5' 7\")  Wt 101.2 kg (223 lb)  SpO2 98%  BMI 34.93 kg/m2    Constitutional: WD-WN-WG cooperative  Eyes: nl EOM, conjunctiva, sclera  ENT: nl external ears, nose, throat  No mucous membrane lesions, normal saliva pool  Neck: no mass or thyroid enlargement  Resp: lungs clear to auscultation, nl to palpation  CV: RRR, no murmurs, rubs or gallops, no edema  GI: no ABD mass or tenderness, no HSM  : not tested  Lymph: no cervical, supraclavicular, " inguinal or epitrochlear nodes  MS: All TMJ, neck, shoulder, elbow, wrist, MCP/PIP/DIP, spine, hip, knee, ankle, and foot MTP/IP joints were examined and  found without active synovitis or deformity. She has mild hypermobility with extension at elbows and knees, but Beighton score is low. No dactylitis,  tenosynovitis, enthesopathy   Skin: no nail pitting, alopecia, rash, nodules or lesions  Neuro: nl cranial nerves, strength, sensation, DTRs.   Psych: nl orientation, affect.         Data:     Lab Results   Component Value Date    WBC 5.5 07/24/2018    WBC 8.0 07/06/2018    WBC 6.1 10/20/2017    HGB 12.1 07/24/2018    HGB 12.8 07/06/2018    HGB 13.4 10/20/2017    HCT 35.9 07/24/2018    HCT 37.1 07/06/2018    HCT 38.8 10/20/2017    MCV 87 07/24/2018    MCV 87 07/06/2018    MCV 90 10/20/2017     07/24/2018     07/06/2018     10/20/2017     Lab Results   Component Value Date    BUN 9 07/24/2018    BUN 10 07/06/2018    BUN 9 03/02/2018     No components found for: SEDRATE  Lab Results   Component Value Date    TSH 1.84 04/26/2018    TSH 2.08 10/05/2017     Lab Results   Component Value Date    AST 26 07/06/2018    AST 35 03/02/2018    AST 36 07/24/2017    ALT 30 07/06/2018    ALT 38 03/02/2018    ALT 56 (H) 07/24/2017    ALKPHOS 144 07/06/2018    ALKPHOS 132 03/02/2018    ALKPHOS 148 07/24/2017     Reviewed Rheumatology lab flowsheet      Adalberto Shipley MD

## 2018-10-26 LAB — COPATH REPORT: NORMAL

## 2018-10-26 NOTE — TELEPHONE ENCOUNTER
Contacted patient and she is concerned as her medical cannabis registry card has .  She would like to see Dr. Rivera to have it reinstated.  She will see Dr. Galicia Tuesday,  to discuss this.  Alexy Pitts LPN at 11:36 AM on 10/26/2018

## 2018-10-29 ENCOUNTER — OFFICE VISIT (OUTPATIENT)
Dept: INTERNAL MEDICINE | Facility: CLINIC | Age: 48
End: 2018-10-29
Payer: COMMERCIAL

## 2018-10-29 VITALS
RESPIRATION RATE: 18 BRPM | HEART RATE: 65 BPM | DIASTOLIC BLOOD PRESSURE: 73 MMHG | BODY MASS INDEX: 34.94 KG/M2 | SYSTOLIC BLOOD PRESSURE: 108 MMHG | WEIGHT: 223.11 LBS | TEMPERATURE: 97.8 F

## 2018-10-29 DIAGNOSIS — G43.919 INTRACTABLE MIGRAINE WITHOUT STATUS MIGRAINOSUS, UNSPECIFIED MIGRAINE TYPE: ICD-10-CM

## 2018-10-29 DIAGNOSIS — G89.29 OTHER CHRONIC PAIN: ICD-10-CM

## 2018-10-29 DIAGNOSIS — E66.3 OVERWEIGHT: Primary | ICD-10-CM

## 2018-10-29 ASSESSMENT — PAIN SCALES - GENERAL: PAINLEVEL: EXTREME PAIN (8)

## 2018-10-29 NOTE — TELEPHONE ENCOUNTER
Spoke to patient on the phone, patient will take that appointment and keep appointment with Dr. Galicia to follow up on other concerns and follow up with Dr. Rivera again at another date to catch her up on remaining health concerns. Appointment was booked. Sondra Kohli LPN 10/29/2018 8:04 AM

## 2018-10-29 NOTE — PROGRESS NOTES
"[facility]  Primary Care Center   Ellyn Rivera MD  10/29/2018      Chief Complaint:   Medication refill      History of Present Illness:   Ada Welch is a 47 year old female with a history of Tom-Danlos disease, chronic pain syndrome, and numerous medical problems who presents alone for evaluation of medication refill.     Chronic pain: The patient reports that this time of year seems to exacerbate her joint pain. She has recently followed up with rheumatology and completed imaging in regard to her joint symptoms. She is looking to follow up with rheumatology to further review these results.     She is currently using medical cannabis capsules as well as topical balm as needed to help reduce her joint changes and discomfort. She uses 2 \"regular red\" capsules in the morning, 1 \"regular red\" and 1 extra strength capsule at noon, and 2 extra strength capsules at bedtime. She feels that this is now her standard regimen which she is tolerating well. Overall, she feels that she can be more productive at home with projects and cleaning. The patient denies any recent visits to the emergency department for pain control. She has not had any recent pain flares since July 2018.     Occasionally she is increasing hungry, therefore she has been experiencing increased weight gain due to increased PO intake. She does not feel that she could tolerate physical activity at this time due to her intolerance. Overall, she reports eating a conscious diet at home, noting that her family attempts consume foods that where only around in the 1800s. She does have a history of a slow metabolism and has taken phentermine to help combat weight gain.     The patient has experienced difficulty regulating her heart rate and pulse with using an effective dosage of medical cannabis. She was started on propranolol has seemed to help control the patient's heart rate and pulse.      Migraines: The patient was recently started on Aimovig in " addition to her other migraines medications. She notes that these has helped to dampen her symptoms though she does not feel that it completely resolves her discomfort. The patient is happy to be receiving some relief.      Vaginal prolapse: She is looking to complete pelvic floor strengthening due to her history of vaginal prolapse. At this time, she is considering moving forward with surgical interventions.     Review of Systems:   Pertinent items are noted in HPI, remainder of complete ROS is negative.      Active Medications:      ACETAMINOPHEN PO, Take 1,000 mg by mouth every 6 hours as needed for pain Take with tramadol, Disp: , Rfl:      albuterol (PROAIR HFA/PROVENTIL HFA/VENTOLIN HFA) 108 (90 BASE) MCG/ACT Inhaler, Inhale 2 puffs into the lungs every 6 hours as needed , Disp: , Rfl:      AMITRIPTYLINE HCL PO, Take 50 mg by mouth At Bedtime , Disp: , Rfl:      buprenorphine (BUTRANS) 10 MCG/HR WK patch, Place 1 patch onto the skin every 7 days, Disp: 4 patch, Rfl: 1     butalbital-acetaminophen-caffeine (FIORICET/ESGIC) -40 MG per tablet, TAKE ONE TABLET BY MOUTH EVERY 4 HOURS AS NEEDED, Disp: , Rfl: 0     cetirizine (ZYRTEC) 10 MG tablet, Take 10 mg by mouth daily , Disp: , Rfl:      DULOXETINE HCL PO, Take 30 mg by mouth 2 times daily ., Disp: , Rfl:      eletriptan (RELPAX) 20 MG tablet, Take 1 tablet (20 mg) by mouth at onset of headache for migraine May repeat in 2 hours. Max 4 tablets/24 hours., Disp: 18 tablet, Rfl: 1     EPINEPHrine (EPIPEN/ADRENACLICK/OR ANY BX GENERIC EQUIV) 0.3 MG/0.3ML injection 2-pack, Inject 0.3 mg into the muscle, Disp: , Rfl:      erenumab-aooe (AIMOVIG 140 DOSE) 70 MG/ML injection, Inject 2 mLs (140 mg) Subcutaneous every 30 days, Disp: 2 pen, Rfl: 11     fluticasone (FLONASE) 50 MCG/ACT spray, INSTILL 2 SPRAYS INTO BOTH NOSTRILS DAILY, Disp: 48 mL, Rfl: 3     fluticasone-salmeterol (ADVAIR DISKUS) 250-50 MCG/DOSE diskus inhaler, Inhale 1 puff into the lungs 2 times  daily as needed , Disp: , Rfl:      levothyroxine (SYNTHROID) 25 MCG tablet, Take 1 tablet (25 mcg) by mouth daily, Disp: 90 tablet, Rfl: 3     linaclotide (LINZESS) 145 MCG capsule, Take 1 capsule (145 mcg) by mouth every morning (before breakfast) Last refill until GI clinic follow up., Disp: 30 capsule, Rfl: 2     medical cannabis (Patient's own supply.  Not a prescription), Take 1 Dose by mouth See Admin Instructions (This is NOT a prescription, and does not certify that the patient has a qualifying medical condition for medical cannabis.  The purpose of this order is  to document that the patient reports taking medical cannabis.), Disp: , Rfl:      metoclopramide (REGLAN) 5 MG tablet, Take 1-2 tablets (5-10 mg) by mouth every 6 hours as needed, Disp: 180 tablet, Rfl: 1     Multiple Vitamins-Minerals (MULTIVITAMIN PO), Take 1 tablet by mouth daily , Disp: , Rfl:      OLANZapine (ZYPREXA) 5 MG tablet, Take 1 tablet (5 mg) by mouth daily as needed (headache pain, nausea) (Patient taking differently: Take 5 mg by mouth At Bedtime ), Disp: 30 tablet, Rfl: 3     omeprazole (PRILOSEC) 40 MG capsule, Take 1 capsule (40 mg) by mouth 2 times daily as needed, Disp: 60 capsule, Rfl: 3     ondansetron (ZOFRAN-ODT) 4 MG ODT tab, Take 1-2 tablets (4-8 mg) by mouth every 12 hours as needed for nausea, Disp: 60 tablet, Rfl: 1     oxyCODONE IR (ROXICODONE) 5 MG tablet, Take 0.5 tablets (2.5 mg) by mouth every 6 hours as needed for severe pain, Disp: 15 tablet, Rfl: 0     Probiotic Product (PROBIOTIC DAILY PO), Take 2 packets by mouth every morning , Disp: , Rfl:      Propranolol HCl SR Beads 120 MG CP24, Take 120 mg by mouth daily, Disp: 30 capsule, Rfl: 11     rizatriptan (MAXALT-MLT) 5 MG ODT tab, TAKE 1-2 TABLETS (5-10 MG) BY MOUTH AT ONSET OF HEADACHE FOR MIGRAINE (MAX 30 MG IN 24 HOURS), Disp: , Rfl: 6     SUMAtriptan (IMITREX STATDOSE) 6 MG/0.5ML pen injector kit, Inject 0.5 ml (6 mg) subcutaneously at onset of migraine,  May repeat in 1 hour , Max 12 mg/24 hrs, Disp: 2 kit, Rfl: 1     tiZANidine (ZANAFLEX) 4 MG tablet, Take 1 tablet (4 mg) by mouth 4 times daily as needed for muscle spasms (Patient taking differently: Take 4 mg by mouth 4 times daily ), Disp: 120 tablet, Rfl: 3     traMADol (ULTRAM) 50 MG tablet, Take 1 tablet (50 mg) by mouth every 6 hours as needed for severe pain, Disp: 60 tablet, Rfl: 1     vitamin B complex with vitamin C (VITAMIN  B COMPLEX) TABS tablet, Take 1 tablet by mouth daily, Disp: , Rfl:      VITAMIN D, CHOLECALCIFEROL, PO, Take 2,000 Units by mouth daily, Disp: , Rfl:      ZONISAMIDE PO, Take 75 mg by mouth daily, Disp: , Rfl:      Allergies:   Bee venom; No clinical screening - see comments; Chicken-derived products (egg); Compazine [prochlorperazine]; Food; Gabapentin; Gluten meal; Pregabalin; Topiramate; Wheat; and Wheat bran      Past Medical History:  Allergic rhinitis   Blood clotting disorder   Chronic tonsillitis   Tom-Danlos disease   Eosinophilic esophagitis   Gastroesophageal reflux disease (GERD)   Hiatal hernia   Hoarseness   Irritable bowel syndrome (IBS) with constipation   Nasal polyps   Autonomic nervous system disorder   Pneumonia   Acquired hypothyroidism   Chronic pain syndrome   Postlaminectomy syndrome, lumbar region   Cervicalgia   Slow transit constipation   Cardiac device in situ   Anxiety disorder  Astigmatism  Chronic sinusitis with recurrent bronchitis  Arthritis, multiple sites  Cyst of fallopian tube  Depression  Deviated nasal septum  Essential hypertension with goal blood pressure less than 140/90  Head and neck lymphadenopathy  Hepatomegaly  Keratoconjunctivitis sicca   Liver lesion  Lumbar degenerative disc disease  Lumbar radiculopathy  Intractable chronic migraine without aura and without status migrainosus  Nasal turbinate hypertrophy  Non compliance with medical treatment  Obese  Opioid type dependence, continuous   Other acute postoperative pain  Pain  disorder associated with psychological and physical factors  Presbyopia  Spondylolisthesis of lumbar region  Uterine endometriosis     Past Surgical History:  Adenoidectomy   Nasal septum repair   Spinal fusion L4-S1  Mole biopsy   Lymph node biopsy   Nasal/sinus polypectomy   Tonsillectomy  Tubal ligation     Family History:   Tom-Danlos disease   Spinal tumor   Migraines   Diabetes    Heart disease   Hypertension   Asthma   Thyroid disease       Social History:   The patient is  with 5 children, a former smoker (quit date: 12/2013), and does consume alcohol.      Physical Exam:   /73 (BP Location: Right arm, Patient Position: Sitting, Cuff Size: Adult Large)  Pulse 65  Temp 97.8  F (36.6  C) (Oral)  Resp 18  Wt 101.2 kg (223 lb 1.7 oz)  LMP 10/28/2018 (Exact Date)  Breastfeeding? No  BMI 34.94 kg/m2      Constitutional: Alert, oriented, pleasant, no acute distress  Head: Normocephalic, atraumatic  Eyes: Extra-ocular movements intact, no scleral icterus  Musculoskeletal: No edema, normal muscle tone, antalgic gait, ambulates with a cane    Neurologic: Alert and oriented, cranial nerves 2-12 intact.  Psychiatric: normal mentation, affect and mood     Assessment and Plan:  1. Other chronic pain  Patient started medical cannabis last year and has noted improvement in functionality and quality of life. She meets criteria for her chronic pain diagnosis.   PEG = 6  [email or web address]    2. Intractable migraine without status migrainosus, unspecified migraine type  Recently started Aimovig. Suggested that if she has remission of mirgraines, she may conider tapering off of some of her other medications that may be contributing to weight gain.     3. Overweight  - Bariatric Adult Referral    She will schedule a f/u visit in 2 months to review chronic issues.      Follow-up: Return in about 2 weeks (around 11/12/2018) for Routine Visit, f/u medical issues.         Scribe Disclosure:  Мария ROBBINS  Triston, am serving as a scribe to document services personally performed by Ellyn Rivera MD at this visit, based upon the provider's statements to me. All documentation has been reviewed by the aforementioned provider prior to being entered into the official medical record.     Portions of this medical record were completed by a scribe. UPON MY REVIEW AND AUTHENTICATION BY ELECTRONIC SIGNATURE, this confirms (a) I performed the applicable clinical services, and (b) the record is accurate.   Ellyn Rivera MD  Internal Medicine    25 min spent face to face, of which >50% time spent on counseling/coordinating care exclusive of any procedure time

## 2018-10-29 NOTE — MR AVS SNAPSHOT
After Visit Summary   10/29/2018    Ada Welch    MRN: [id number]           Patient Information     Date Of Birth          1970        Visit Information        Provider Department      10/29/2018 3:40 PM Ellyn Rivera MD [facility]        Today's Diagnoses     Overweight    -  1    Other chronic pain        Intractable migraine without status migrainosus, unspecified migraine type           Follow-ups after your visit        Additional Services     Bariatric Adult Referral       Your provider has referred you to: [facility]: [facility] [place] [phone]  [email or web address]    Please be aware that coverage of these services is subject to the terms and limitations of your health insurance plan.  Call member services at your health plan with any benefit or coverage questions.      Please bring the following with you to your appointment:      (1) List of current medications   (2) This referral request   (3) Any documents/labs given to you for this referral                  Follow-up notes from your care team     Return in about 2 weeks (around 11/12/2018) for Routine Visit, f/u medical issues.      Your next 10 appointments already scheduled     Oct 30, 2018  3:45 PM CDT   (Arrive by 3:30 PM)   Return Visit with Shilpa RAMIREZ MD   [facility] ([facility])    [address] [place] [id number] [phone]            Nov 08, 2018  9:00 AM CST   (Arrive by 8:45 AM)   Return Visit with Faiza Martinez MD   [facility] ([facility])    [address] [place] [id number] [phone]            Nov 30, 2018 12:20 PM CST   (Arrive by 12:05 PM)   New Weight Management Visit with Sury Grossman MD     [facility] ([facility])    [address]  [address] [facility] [address]   [phone]            Nov 30, 2018  1:30 PM CST   (Arrive by 1:15 PM)   New Weight Management Visit with Jolene España RD   [facility] ([facility])    [address]  [address] [facility] [address]   [phone]            Dec 03, 2018  1:15 PM CST   (Arrive by 1:00 PM)   New Allergy with Vahe Rodriguez MD   [facility] ([facility])    [address]  [address] [place] [id number] [phone]           Do not take anti-histamines or Zantac for seven day prior to your appointment.            Dec 03, 2018  6:30 PM CST   (Arrive by 6:15 PM)   Return Visit with Zoe Barahona MD   [facility] ([facility])    [address] [id number] [phone]              Who to contact     Please call your clinic at [phone] to:    Ask questions about your health    Make or cancel appointments    Discuss your medicines    Learn about your test results    Speak to your doctor            Additional Information About Your Visit        Zeus Information     Zeus gives you secure access to your electronic health record. If you see a primary care provider, you can also send messages to your care team and make appointments. If you have questions, please call your primary care clinic.  If you do not have a primary care provider, please call [phone] and they will assist you.      Zeus is an electronic gateway that provides easy, online access to your medical records. With Zeus, you can request a clinic appointment, read your test results, renew a prescription or communicate with your care team.     To access your existing account, please contact your [facility] or call [phone] for assistance.        Care EveryWhere ID     This is your Care EveryWhere ID. This could be used by other organizations to access your [place] medical records  [id number]        Your Vitals Were     Pulse Temperature Respirations Last Period Breastfeeding? BMI (Body Mass Index)    65 97.8  F (36.6  C) (Oral) 18 10/28/2018 (Exact Date) No 34.94 kg/m2       Blood Pressure from Last 3 Encounters:   10/29/18 108/73   10/25/18 110/77   10/22/18 112/85    Weight from Last 3 Encounters:   10/29/18 101.2 kg (223 lb 1.7 oz)   10/25/18 101.2 kg (223 lb)   09/20/18 97.1 kg (214 lb)              We Performed the Following     Bariatric Adult Referral          Today's Medication Changes          These changes are accurate as of 10/29/18 11:59 PM.  If you have any questions, ask your nurse or doctor.               These medicines have changed or have updated prescriptions.        Dose/Directions    OLANZapine 5 MG tablet   Commonly known as:  zyPREXA   This may have changed:  when to take this   Used for:  Other migraine with status migrainosus, intractable, Intractable chronic migraine without aura and without status migrainosus        Dose:  5 mg   Take 1 tablet (5 mg) by mouth daily as needed (headache pain, nausea)   Quantity:  30 tablet   Refills:  3       tiZANidine 4 MG tablet   Commonly known as:  ZANAFLEX   This may have changed:  when to take this        Dose:  4 mg   Take 1 tablet (4 mg) by mouth 4 times daily as needed for muscle spasms   Quantity:  120 tablet   Refills:  3                Primary Care Provider Office Phone # Fax #    Ellyn Rivera [phone]       [address] [address]        Equal Access to Services     CHRISTINA GUY : Ashutosh Slater, herve oakes, analilia arellano, odette tang. So [facility] [phone].    ATENCIÓN: Si louis samuel, sheyla ramos sweeney  disposición servicios gratuitos de asistencia lingüística. Isaías carr [phone].    We comply with applicable federal civil rights laws and [place] laws. We do not discriminate on the basis of race, color, national origin, age, disability, sex, sexual orientation, or gender identity.            Thank you!     Thank you for choosing [facility]  for your care. Our goal is always to provide you with excellent care. Hearing back from our patients is one way we can continue to improve our services. Please take a few minutes to complete the written survey that you may receive in the mail after your visit with us. Thank you!             Your Updated Medication List - Protect others around you: Learn how to safely use, store and throw away your medicines at www.disposemymeds.org.          This list is accurate as of 10/29/18 11:59 PM.  Always use your most recent med list.                   Brand Name Dispense Instructions for use Diagnosis    ACETAMINOPHEN PO      Take 1,000 mg by mouth every 6 hours as needed for pain Take with tramadol        ADVAIR DISKUS 250-50 MCG/DOSE diskus inhaler   Generic drug:  fluticasone-salmeterol      Inhale 1 puff into the lungs 2 times daily as needed        albuterol 108 (90 Base) MCG/ACT inhaler    PROAIR HFA/PROVENTIL HFA/VENTOLIN HFA     Inhale 2 puffs into the lungs every 6 hours as needed        AMITRIPTYLINE HCL PO      Take 50 mg by mouth At Bedtime        buprenorphine 10 MCG/HR WK patch    BUTRANS    4 patch    Place 1 patch onto the skin every 7 days    Postlaminectomy syndrome of lumbar region       butalbital-acetaminophen-caffeine -40 MG per tablet    FIORICET/ESGIC     TAKE ONE TABLET BY MOUTH EVERY 4 HOURS AS NEEDED        CEFALY KIT Pauly     1 Device    1 Device daily as needed    Intractable chronic migraine without aura and without status migrainosus       cetirizine 10 MG tablet    zyrTEC     Take 10 mg by mouth daily        COMPRESSION  STOCKINGS     3 each    1 each daily 20-30 mmHg Thigh Length measure and fit, color and style per patient preference. Doff n jose per need.    Orthostatic hypotension       DULOXETINE HCL PO      Take 30 mg by mouth 2 times daily .        eletriptan 20 MG tablet    RELPAX    18 tablet    Take 1 tablet (20 mg) by mouth at onset of headache for migraine May repeat in 2 hours. Max 4 tablets/24 hours.    Chronic daily headache, Intractable migraine without aura and with status migrainosus       EPINEPHrine 0.3 MG/0.3ML injection 2-pack    EPIPEN/ADRENACLICK/or ANY BX GENERIC EQUIV     Inject 0.3 mg into the muscle        erenumab-aooe 70 MG/ML injection    AIMOVIG 140 DOSE    2 pen    Inject 2 mLs (140 mg) Subcutaneous every 30 days    Intractable chronic migraine without aura and without status migrainosus       famotidine 20 MG tablet    PEPCID    90 tablet    Take 1 tablet (20 mg) by mouth At Bedtime    Hiatal hernia, Mast cell disorder       fluticasone 50 MCG/ACT spray    FLONASE    48 mL    INSTILL 2 SPRAYS INTO BOTH NOSTRILS DAILY    Chronic rhinitis, unspecified type       levothyroxine 25 MCG tablet    SYNTHROID    90 tablet    Take 1 tablet (25 mcg) by mouth daily    Hypothyroidism, unspecified type       linaclotide 145 MCG capsule    LINZESS    30 capsule    Take 1 capsule (145 mcg) by mouth every morning (before breakfast) Last refill until GI clinic follow up.    Chronic idiopathic constipation       medical cannabis (Patient's own supply.  Not a prescription)      Take 1 Dose by mouth See Admin Instructions (This is NOT a prescription, and does not certify that the patient has a qualifying medical condition for medical cannabis.  The purpose of this order is  to document that the patient reports taking medical cannabis.)        metoclopramide 5 MG tablet    REGLAN    180 tablet    Take 1-2 tablets (5-10 mg) by mouth every 6 hours as needed    Intractable chronic migraine without aura and without status  migrainosus       MULTIVITAMIN PO      Take 1 tablet by mouth daily        OLANZapine 5 MG tablet    zyPREXA    30 tablet    Take 1 tablet (5 mg) by mouth daily as needed (headache pain, nausea)    Other migraine with status migrainosus, intractable, Intractable chronic migraine without aura and without status migrainosus       omeprazole 40 MG capsule    priLOSEC    60 capsule    Take 1 capsule (40 mg) by mouth 2 times daily as needed    Eosinophilic esophagitis       ondansetron 4 MG ODT tab    ZOFRAN-ODT    60 tablet    Take 1-2 tablets (4-8 mg) by mouth every 12 hours as needed for nausea    Migraine without status migrainosus, not intractable, unspecified migraine type       oxyCODONE IR 5 MG tablet    ROXICODONE    15 tablet    Take 0.5 tablets (2.5 mg) by mouth every 6 hours as needed for severe pain    Postlaminectomy syndrome       PROBIOTIC DAILY PO      Take 2 packets by mouth every morning        Propranolol HCl SR Beads 120 MG Cp24     30 capsule    Take 120 mg by mouth daily    Sinus tachycardia       rizatriptan 5 MG ODT tab    MAXALT-MLT     TAKE 1-2 TABLETS (5-10 MG) BY MOUTH AT ONSET OF HEADACHE FOR MIGRAINE (MAX 30 MG IN 24 HOURS)        SUMAtriptan 6 MG/0.5ML pen injector kit    IMITREX STATDOSE    2 kit    Inject 0.5 ml (6 mg) subcutaneously at onset of migraine, May repeat in 1 hour , Max 12 mg/24 hrs    Chronic migraine without aura without status migrainosus, not intractable       tiZANidine 4 MG tablet    ZANAFLEX    120 tablet    Take 1 tablet (4 mg) by mouth 4 times daily as needed for muscle spasms        traMADol 50 MG tablet    ULTRAM    60 tablet    Take 1 tablet (50 mg) by mouth every 6 hours as needed for severe pain    Postlaminectomy syndrome       vitamin B complex with vitamin C Tabs tablet      Take 1 tablet by mouth daily        VITAMIN D (CHOLECALCIFEROL) PO      Take 2,000 Units by mouth daily        ZONISAMIDE PO      Take 75 mg by mouth daily

## 2018-10-31 ENCOUNTER — TELEPHONE (OUTPATIENT)
Dept: ANESTHESIOLOGY | Facility: CLINIC | Age: 48
End: 2018-10-31

## 2018-10-31 DIAGNOSIS — M96.1 POSTLAMINECTOMY SYNDROME: ICD-10-CM

## 2018-10-31 DIAGNOSIS — M96.1 POST LAMINECTOMY SYNDROME: Primary | ICD-10-CM

## 2018-10-31 NOTE — TELEPHONE ENCOUNTER
[facility] Call Center    Phone Message    May a detailed message be left on voicemail: yes    Reason for Call: Other: Pt returned Jessica's call. Please call her back.     Action Taken: Message routed to:  [facility] ([facility] Pain

## 2018-10-31 NOTE — TELEPHONE ENCOUNTER
Pt is having a lot of pain and would like a call back at the number provided to discuss with a nurse

## 2018-10-31 NOTE — TELEPHONE ENCOUNTER
LPN returned pt's phone call. Pt states that they are currently having a pain flare. Pt stated this is not a new pain, just an increase in their existing pain. Pt thought it could be from the change in the weather.   Pt states that in the past she has called to report pain flares and Dr. Martinez has offered Tramadol.   LPN informed pt that [facility] will update Dr. Martinez as they are in clinic together tomorrow. [facility] stated they could call the pt to follow up on 11/1/18.    Pt was agreeable to this.     LPN informed the pt that if they felt as though their pain is uncontrolled, they should go to the ED or Urgent care for evaluation. Pt declined but verbalized understanding that she was aware this was an option if she needed it.     Mya Cash LPN

## 2018-11-01 RX ORDER — TRAMADOL HYDROCHLORIDE 50 MG/1
50 TABLET ORAL EVERY 6 HOURS PRN
Qty: 60 TABLET | Refills: 0 | Status: SHIPPED | OUTPATIENT
Start: 2018-11-01 | End: 2018-11-08

## 2018-11-01 NOTE — TELEPHONE ENCOUNTER
Refill request    Medication: traMADol (ULTRAM) 50 MG tablet  Take 1 tablet (50 mg) by mouth every 6 hours as needed for severe pain      MNPMP Checked: Yes    traMADol (ULTRAM) 50 MG tablet - Last refilled 8/16/18 for 60 tablets      Refilled: YES, dated to be refilled 30 days since last refill    Last clinic appointment:6/29/18  Next clinic appointment:11/8/18    Patient requested to:      Sent to pharmacy

## 2018-11-08 ENCOUNTER — OFFICE VISIT (OUTPATIENT)
Dept: ANESTHESIOLOGY | Facility: CLINIC | Age: 48
End: 2018-11-08
Payer: COMMERCIAL

## 2018-11-08 VITALS — SYSTOLIC BLOOD PRESSURE: 119 MMHG | OXYGEN SATURATION: 99 % | HEART RATE: 68 BPM | DIASTOLIC BLOOD PRESSURE: 69 MMHG

## 2018-11-08 DIAGNOSIS — M96.1 POSTLAMINECTOMY SYNDROME OF LUMBAR REGION: ICD-10-CM

## 2018-11-08 DIAGNOSIS — M96.1 POSTLAMINECTOMY SYNDROME: ICD-10-CM

## 2018-11-08 RX ORDER — BUPRENORPHINE 10 UG/H
1 PATCH TRANSDERMAL
Qty: 4 PATCH | Refills: 2 | Status: SHIPPED | OUTPATIENT
Start: 2018-11-10 | End: 2019-02-11

## 2018-11-08 RX ORDER — TRAMADOL HYDROCHLORIDE 50 MG/1
50 TABLET ORAL EVERY 6 HOURS PRN
Qty: 60 TABLET | Refills: 1 | Status: SHIPPED | OUTPATIENT
Start: 2018-12-01 | End: 2019-02-07

## 2018-11-08 ASSESSMENT — ANXIETY QUESTIONNAIRES
3. WORRYING TOO MUCH ABOUT DIFFERENT THINGS: NOT AT ALL
4. TROUBLE RELAXING: NOT AT ALL
GAD7 TOTAL SCORE: 0
2. NOT BEING ABLE TO STOP OR CONTROL WORRYING: NOT AT ALL
GAD7 TOTAL SCORE: 0
6. BECOMING EASILY ANNOYED OR IRRITABLE: NOT AT ALL
5. BEING SO RESTLESS THAT IT IS HARD TO SIT STILL: NOT AT ALL
7. FEELING AFRAID AS IF SOMETHING AWFUL MIGHT HAPPEN: NOT AT ALL
7. FEELING AFRAID AS IF SOMETHING AWFUL MIGHT HAPPEN: NOT AT ALL
GAD7 TOTAL SCORE: 0
1. FEELING NERVOUS, ANXIOUS, OR ON EDGE: NOT AT ALL

## 2018-11-08 ASSESSMENT — PAIN SCALES - GENERAL: PAINLEVEL: EXTREME PAIN (8)

## 2018-11-08 NOTE — PROGRESS NOTES
[facility] for Comprehensive Chronic Pain Management : Progress Note    Date of visit: 11/8/2018    Interval history:    Ada Welch is a 47 year old female  is known to me for  chronic  low back pain. The patient has very complex history of low back pain for nearly 24 years for which she underwent L4-S1 fusion (TLIF) 12/09/2013.  Postoperatively her  back pain even became much worse than preop.  Mainly LBP, but also radiates down both legs S1 dermatome distribution.   She then had  removal of posterior instrumentation (screws and guadalupe) .  After the surgery her pain continued.  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  She was admitted in the hospital 7/6/2018 to 7/9/2018 for intractable migraine.  Prior to the admission she has had on and off severe headache, photophobia, diplopia and nausea.    Today she reports lower back pain, which has been significantly worse in the recent days.  She has been seeing Dr. Moncada for chronic bilateral lower extremity pain.  Per Dr. Moncada's note,  no surgery required for her current symptomatology.    She also reports autonomic dysfunction.  Has significant history of dysautonomia.  Since 2014 she reports that her heart rate has fluctuated from as low as  beats per minutes and her blood pressure also fluctuates at the same time without any aggravating factors.  The fluctuation of the blood pressure and heart rate happens when she is supine, sitting, standing, walking etc.  She had 2 episodes of blacking out for a few seconds.  She has seen cardiologist Dr. Russell who advised her to continue fludrocortisone, isometric exercise,  low carbohydrate and low gluten diet.    She has been seeing Dr. Singh for urinary urgency and incontinence.  She is advised for improving lifestyle, dietary modification, optimizing bowel regimen and to start pelvic physical therapy.     She has been managing her pain with stable dose of Butrans 10 mics per  hour q. 7 days, tramadol 50 mg every 6 hours as needed which she refills every 2-3 months.  In addition she takes tizanidine 4 mg 4 times daily as needed.  She takes sumatriptan for breakthrough migraine.  Occasionally she takes oxycodone 5 mg when her pain gets significantly worse.  Overall she has been doing well.  She came today for a follow-up visit.  No new complaints.      [place] Prescription Monitoring Program:   Reviewed. No concerns     Review of Systems:  The 14 system ROS was reviewed and was negative except what is documented above and as follows.  Any bowel or bladder problems: none  Mood: okay    Physical Exam:  Vitals:    11/08/18 0855   BP: 119/69   Pulse: 68   SpO2: 99%       General: Awake in no apparent distress.   Eyes: Sclerae are anicteric. PERRLA, EOMI   Neck: supple, no masses.   Lungs: unlabored.   Heart: regular rate and rhythm   Abdomen: soft non tender.  Extremities: Pulses are well palpable, no peripheral edema.   Musculoskeletal: 5/5 muscle strength in all extremities.   Neurologic exam:Sensation intact throughout all dermatomes bilateral upper extremities and lower extremities  Psychiatric; Normal affect.   Skin: Warm and Dry.    Medications:  Current Outpatient Prescriptions   Medication Sig Dispense Refill     ACETAMINOPHEN PO Take 1,000 mg by mouth every 6 hours as needed for pain Take with tramadol       albuterol (PROAIR HFA/PROVENTIL HFA/VENTOLIN HFA) 108 (90 BASE) MCG/ACT Inhaler Inhale 2 puffs into the lungs every 6 hours as needed        AMITRIPTYLINE HCL PO Take 50 mg by mouth At Bedtime        buprenorphine (BUTRANS) 10 MCG/HR WK patch Place 1 patch onto the skin every 7 days 4 patch 1     butalbital-acetaminophen-caffeine (FIORICET/ESGIC) -40 MG per tablet TAKE ONE TABLET BY MOUTH EVERY 4 HOURS AS NEEDED  0     cetirizine (ZYRTEC) 10 MG tablet Take 10 mg by mouth daily        COMPRESSION STOCKINGS 1 each daily 20-30 mmHg Thigh Length measure and fit, color and  style per patient preference. Antolin garcia per need. 3 each 3     DULOXETINE HCL PO Take 30 mg by mouth 2 times daily .       eletriptan (RELPAX) 20 MG tablet Take 1 tablet (20 mg) by mouth at onset of headache for migraine May repeat in 2 hours. Max 4 tablets/24 hours. 18 tablet 1     EPINEPHrine (EPIPEN/ADRENACLICK/OR ANY BX GENERIC EQUIV) 0.3 MG/0.3ML injection 2-pack Inject 0.3 mg into the muscle       erenumab-aooe (AIMOVIG 140 DOSE) 70 MG/ML injection Inject 2 mLs (140 mg) Subcutaneous every 30 days 2 pen 11     famotidine (PEPCID) 20 MG tablet Take 1 tablet (20 mg) by mouth At Bedtime 90 tablet 1     fluticasone (FLONASE) 50 MCG/ACT spray INSTILL 2 SPRAYS INTO BOTH NOSTRILS DAILY 48 mL 3     fluticasone-salmeterol (ADVAIR DISKUS) 250-50 MCG/DOSE diskus inhaler Inhale 1 puff into the lungs 2 times daily as needed        levothyroxine (SYNTHROID) 25 MCG tablet Take 1 tablet (25 mcg) by mouth daily 90 tablet 3     linaclotide (LINZESS) 145 MCG capsule Take 1 capsule (145 mcg) by mouth every morning (before breakfast) Last refill until GI clinic follow up. 30 capsule 2     medical cannabis (Patient's own supply.  Not a prescription) Take 1 Dose by mouth See Admin Instructions (This is NOT a prescription, and does not certify that the patient has a qualifying medical condition for medical cannabis.  The purpose of this order is  to document that the patient reports taking medical cannabis.)       metoclopramide (REGLAN) 5 MG tablet Take 1-2 tablets (5-10 mg) by mouth every 6 hours as needed 180 tablet 1     Multiple Vitamins-Minerals (MULTIVITAMIN PO) Take 1 tablet by mouth daily        Nerve Stimulator (CEFALY KIT) KAROLINE 1 Device daily as needed 1 Device 0     omeprazole (PRILOSEC) 40 MG capsule Take 1 capsule (40 mg) by mouth 2 times daily as needed 60 capsule 3     ondansetron (ZOFRAN-ODT) 4 MG ODT tab Take 1-2 tablets (4-8 mg) by mouth every 12 hours as needed for nausea 60 tablet 1     oxyCODONE IR  (ROXICODONE) 5 MG tablet Take 0.5 tablets (2.5 mg) by mouth every 6 hours as needed for severe pain 15 tablet 0     Probiotic Product (PROBIOTIC DAILY PO) Take 2 packets by mouth every morning        Propranolol HCl SR Beads 120 MG CP24 Take 120 mg by mouth daily 30 capsule 11     rizatriptan (MAXALT-MLT) 5 MG ODT tab TAKE 1-2 TABLETS (5-10 MG) BY MOUTH AT ONSET OF HEADACHE FOR MIGRAINE (MAX 30 MG IN 24 HOURS)  6     SUMAtriptan (IMITREX STATDOSE) 6 MG/0.5ML pen injector kit Inject 0.5 ml (6 mg) subcutaneously at onset of migraine, May repeat in 1 hour , Max 12 mg/24 hrs 2 kit 1     tiZANidine (ZANAFLEX) 4 MG tablet Take 1 tablet (4 mg) by mouth 4 times daily as needed for muscle spasms 120 tablet 1     traMADol (ULTRAM) 50 MG tablet Take 1 tablet (50 mg) by mouth every 6 hours as needed for severe pain 60 tablet 0     vitamin B complex with vitamin C (VITAMIN  B COMPLEX) TABS tablet Take 1 tablet by mouth daily       VITAMIN D, CHOLECALCIFEROL, PO Take 2,000 Units by mouth daily       ZONISAMIDE PO Take 75 mg by mouth daily         Analgesic Medications:   Medications related to Pain Management     None             LABORATORY VALUES:   Recent Labs   Lab Test  07/24/18   0648  07/06/18 2229   NA  139  138   POTASSIUM  3.8  3.8   CHLORIDE  109  104   CO2  23  24   ANIONGAP  7  10   GLC  97  92   BUN  9  10   CR  0.85  0.83   JUAN  7.8*  8.0*       CBC RESULTS:   Recent Labs   Lab Test  07/24/18   0648   WBC  5.5   RBC  4.14   HGB  12.1   HCT  35.9   MCV  87   MCH  29.2   MCHC  33.7   RDW  13.1   PLT  231       Most Recent 3 INR's:  Recent Labs   Lab Test  07/06/18   2229  07/25/17   1249   INR  0.96  1.01           ASSESSMENT:    Failed back surgery syndrome  Intractable migraine  Tom-Danlos syndrome  Dysautonomia  Gastroparesis  Urinary urgency and incontinence  Chronic continuous use of opioid  Intractable chronic migraine without aura and without status migrainosus        PLAN:    1. Medications.   Continue  transdermal Butrans 10 mics per hour q. 7 days.  4 patches dispensed today.  Continue propranolol 120 mg p.o. daily  Continued Zanaflex 4 mg 4 times daily as needed  Continue tramadol 50 mg p.o. every 6 hours as needed.  Post dated prescription of 60 tablets given today with early refill date on 12/11/2018  Amitriptyline 50 mg nightly  Cymbalta 30 mg twice daily  Maxalt 5-10 mg p.o. at onset of the headache for migraine (max 30 mg in 24 hours)    2. Interventional procedures:    None at this time.     3. Labs and imaging: None needed for pain management. The patient will provide a urine/drug screen at this time to monitor and adhere to the plan of care (as drawn out in our pain contract) as well as to screen for the presence of illicit drugs/substances.      4. Rehab:  Advised to perform home exercise using [employer] videos  [email or web address].  The patient is also encouraged to stay active as tolerated.     5. Psychology: No current needs.     6. Disposition:  The patient will follow up in our outpatient clinic in 12 weeks or earlier if clinically indicated.       Assessment will be ongoing with changes in treatment as indicated.  Benefits/risks/alternatives to treatment have been reviewed and the patient has been instructed to contact this office if they have any questions or concerns.  This plan of care has been discussed with the patient and the patient is in agreement.     Faiza Martinez MD, PHD

## 2018-11-08 NOTE — MR AVS SNAPSHOT
After Visit Summary   11/8/2018    Ada Welch    MRN: [id number]           Patient Information     Date Of Birth          1970        Visit Information        Provider Department      11/8/2018 9:00 AM Faiza Martinez MD [facility] for [facility] Management        Care Instructions    1. Continue taking medications as prescribed.      Follow up: 3 months or sooner if needed       To speak with a nurse, schedule/reschedule/cancel a clinic appointment, or request a medication refill call: [phone]     You can also reach us by [employer]: [email or web address]    For refills, please call on Monday, 1 week before your medication runs out. The doctors are not always in clinic, so this gives us time to get your prescriptions ready.  Please let us know the name of the medication you are requesting a refill of.                                     Follow-ups after your visit        Your next 10 appointments already scheduled     Nov 13, 2018  4:15 PM CST   (Arrive by 4:00 PM)   Return Visit with Shilpa RAMIREZ MD   [facility] ([facility])    [address] [place] [address]   [phone]            Nov 30, 2018 12:20 PM CST   (Arrive by 12:05 PM)   New Weight Management Visit with Sury Grossman MD   [facility] ([facility])    [address] [place] [address]   [phone]            Nov 30, 2018  1:30 PM CST   (Arrive by 1:15 PM)   New Weight Management Visit with Jolene España RD   [facility] ([facility])    [address] [place] [address]   [phone]            Dec 03, 2018  1:15 PM CST   (Arrive by 1:00 PM)   New Allergy with Vahe Rodriguez MD   [facility] ([facility])    [address]  [address]  [facility] [address] [phone]           Do not take anti-histamines or Zantac for seven day prior to your appointment.            Dec 03, 2018  6:30 PM CST   (Arrive by 6:15 PM)   Return Visit with Zoe Barahona MD   [facility] ([facility])    [address]  [address]  [facility] [address] [phone]              Who to contact     Please call your clinic at [phone] to:    Ask questions about your health    Make or cancel appointments    Discuss your medicines    Learn about your test results    Speak to your doctor            Additional Information About Your Visit        [employer] Information     [employer] gives you secure access to your electronic health record. If you see a primary care provider, you can also send messages to your care team and make appointments. If you have questions, please call your primary care clinic.  If you do not have a primary care provider, please call [phone] and they will assist you.      [employer] is an electronic gateway that provides easy, online access to your medical records. With [employer], you can request a clinic appointment, read your test results, renew a prescription or communicate with your care team.     To access your existing account, please contact your [facility] or call [phone] for assistance.        Care EveryWhere ID     This is your Care EveryWhere ID. This could be used by other organizations to access your [place] medical records  [id number]        Your Vitals Were     Pulse Last Period Pulse Oximetry             68 10/28/2018 (Exact Date) 99%          Blood Pressure from Last 3 Encounters:   11/08/18 119/69   10/29/18 108/73   10/25/18 110/77    Weight from Last 3 Encounters:   10/29/18 101.2 kg (223 lb 1.7 oz)   10/25/18 101.2 kg (223 lb)   09/20/18 97.1 kg (214 lb)              Today, you had the  following     No orders found for display         Today's Medication Changes          These changes are accurate as of 11/8/18  9:46 AM.  If you have any questions, ask your nurse or doctor.               Stop taking these medicines if you haven't already. Please contact your care team if you have questions.     OLANZapine 5 MG tablet   Commonly known as:  zyPREXA   Stopped by:  Faiza Martinez MD                    Primary Care Provider Office Phone # Fax #    [place] [phone] [address] [address]        Equal Access to Services     [facility]: Hadii aad ku hadasho Soomaali, waaxda luqadaha, qaybta kaalmada adeclement, odette dykes . So [facility] [phone].    ATENCIÓN: Si habla español, tiene a sweeney disposición servicios gratuitos de asistencia lingüística. [facility] [phone].    We comply with applicable federal civil rights laws and [place] laws. We do not discriminate on the basis of race, color, national origin, age, disability, sex, sexual orientation, or gender identity.            Thank you!     Thank you for choosing [facility]  for your care. Our goal is always to provide you with excellent care. Hearing back from our patients is one way we can continue to improve our services. Please take a few minutes to complete the written survey that you may receive in the mail after your visit with us. Thank you!             Your Updated Medication List - Protect others around you: Learn how to safely use, store and throw away your medicines at www.disposemymeds.org.          This list is accurate as of 11/8/18  9:46 AM.  Always use your most recent med list.                   Brand Name Dispense Instructions for use Diagnosis    ACETAMINOPHEN PO      Take 1,000 mg by mouth every 6 hours as needed for pain Take with tramadol        ADVAIR DISKUS 250-50 MCG/DOSE diskus inhaler    Generic drug:  fluticasone-salmeterol      Inhale 1 puff into the lungs 2 times daily as needed        albuterol 108 (90 Base) MCG/ACT inhaler    PROAIR HFA/PROVENTIL HFA/VENTOLIN HFA     Inhale 2 puffs into the lungs every 6 hours as needed        AMITRIPTYLINE HCL PO      Take 50 mg by mouth At Bedtime        buprenorphine 10 MCG/HR WK patch    BUTRANS    4 patch    Place 1 patch onto the skin every 7 days    Postlaminectomy syndrome of lumbar region       butalbital-acetaminophen-caffeine -40 MG per tablet    FIORICET/ESGIC     TAKE ONE TABLET BY MOUTH EVERY 4 HOURS AS NEEDED        CEFALY KIT Pauly     1 Device    1 Device daily as needed    Intractable chronic migraine without aura and without status migrainosus       cetirizine 10 MG tablet    zyrTEC     Take 10 mg by mouth daily        COMPRESSION STOCKINGS     3 each    1 each daily 20-30 mmHg Thigh Length measure and fit, color and style per patient preference. Doff n jose per need.    Orthostatic hypotension       DULOXETINE HCL PO      Take 30 mg by mouth 2 times daily .        eletriptan 20 MG tablet    RELPAX    18 tablet    Take 1 tablet (20 mg) by mouth at onset of headache for migraine May repeat in 2 hours. Max 4 tablets/24 hours.    Chronic daily headache, Intractable migraine without aura and with status migrainosus       EPINEPHrine 0.3 MG/0.3ML injection 2-pack    EPIPEN/ADRENACLICK/or ANY BX GENERIC EQUIV     Inject 0.3 mg into the muscle        erenumab-aooe 70 MG/ML injection    AIMOVIG 140 DOSE    2 pen    Inject 2 mLs (140 mg) Subcutaneous every 30 days    Intractable chronic migraine without aura and without status migrainosus       famotidine 20 MG tablet    PEPCID    90 tablet    Take 1 tablet (20 mg) by mouth At Bedtime    Hiatal hernia, Mast cell disorder       fluticasone 50 MCG/ACT spray    FLONASE    48 mL    INSTILL 2 SPRAYS INTO BOTH NOSTRILS DAILY    Chronic rhinitis, unspecified type       levothyroxine 25 MCG tablet     SYNTHROID    90 tablet    Take 1 tablet (25 mcg) by mouth daily    Hypothyroidism, unspecified type       linaclotide 145 MCG capsule    LINZESS    30 capsule    Take 1 capsule (145 mcg) by mouth every morning (before breakfast) Last refill until GI clinic follow up.    Chronic idiopathic constipation       medical cannabis (Patient's own supply.  Not a prescription)      Take 1 Dose by mouth See Admin Instructions (This is NOT a prescription, and does not certify that the patient has a qualifying medical condition for medical cannabis.  The purpose of this order is  to document that the patient reports taking medical cannabis.)        metoclopramide 5 MG tablet    REGLAN    180 tablet    Take 1-2 tablets (5-10 mg) by mouth every 6 hours as needed    Intractable chronic migraine without aura and without status migrainosus       MULTIVITAMIN PO      Take 1 tablet by mouth daily        omeprazole 40 MG capsule    priLOSEC    60 capsule    Take 1 capsule (40 mg) by mouth 2 times daily as needed    Eosinophilic esophagitis       ondansetron 4 MG ODT tab    ZOFRAN-ODT    60 tablet    Take 1-2 tablets (4-8 mg) by mouth every 12 hours as needed for nausea    Migraine without status migrainosus, not intractable, unspecified migraine type       oxyCODONE IR 5 MG tablet    ROXICODONE    15 tablet    Take 0.5 tablets (2.5 mg) by mouth every 6 hours as needed for severe pain    Postlaminectomy syndrome       PROBIOTIC DAILY PO      Take 2 packets by mouth every morning        Propranolol HCl SR Beads 120 MG Cp24     30 capsule    Take 120 mg by mouth daily    Sinus tachycardia       rizatriptan 5 MG ODT tab    MAXALT-MLT     TAKE 1-2 TABLETS (5-10 MG) BY MOUTH AT ONSET OF HEADACHE FOR MIGRAINE (MAX 30 MG IN 24 HOURS)        SUMAtriptan 6 MG/0.5ML pen injector kit    IMITREX STATDOSE    2 kit    Inject 0.5 ml (6 mg) subcutaneously at onset of migraine, May repeat in 1 hour , Max 12 mg/24 hrs    Chronic migraine without aura  without status migrainosus, not intractable       tiZANidine 4 MG tablet    ZANAFLEX    120 tablet    Take 1 tablet (4 mg) by mouth 4 times daily as needed for muscle spasms    Post laminectomy syndrome       traMADol 50 MG tablet    ULTRAM    60 tablet    Take 1 tablet (50 mg) by mouth every 6 hours as needed for severe pain    Postlaminectomy syndrome       vitamin B complex with vitamin C Tabs tablet      Take 1 tablet by mouth daily        VITAMIN D (CHOLECALCIFEROL) PO      Take 2,000 Units by mouth daily        ZONISAMIDE PO      Take 75 mg by mouth daily

## 2018-11-08 NOTE — PATIENT INSTRUCTIONS
1. Continue taking medications as prescribed.      Follow up: 3 months or sooner if needed       To speak with a nurse, schedule/reschedule/cancel a clinic appointment, or request a medication refill call: [phone]     You can also reach us by [employer]: [email or web address]    For refills, please call on Monday, 1 week before your medication runs out. The doctors are not always in clinic, so this gives us time to get your prescriptions ready.  Please let us know the name of the medication you are requesting a refill of.

## 2018-11-09 ASSESSMENT — ANXIETY QUESTIONNAIRES: GAD7 TOTAL SCORE: 0

## 2018-11-14 DIAGNOSIS — K20.0 EOSINOPHILIC ESOPHAGITIS: ICD-10-CM

## 2018-11-15 RX ORDER — OMEPRAZOLE 40 MG/1
40 CAPSULE, DELAYED RELEASE ORAL 2 TIMES DAILY PRN
Qty: 180 CAPSULE | Refills: 2 | Status: SHIPPED | OUTPATIENT
Start: 2018-11-15 | End: 2019-12-13

## 2018-11-15 NOTE — TELEPHONE ENCOUNTER
omeprazole (PRILOSEC) 40 MG capsule  Last Written Prescription Date:  8/8/18  Last Fill Quantity: 60,   # refills: 3  Last Office Visit :  8/8/18  Future Office visit:  None

## 2018-11-19 NOTE — TELEPHONE ENCOUNTER
FUTURE VISIT INFORMATION      FUTURE VISIT INFORMATION:    Date: 12.3.18    Time: 1:15 Pm    Location: [facility]  REFERRAL INFORMATION:    Referring provider:  Ellyn Rivera    Referring providers clinic:  [facility]    Reason for visit/diagnosis  Allergic State    RECORDS REQUESTED FROM:       Clinic name Comments Records Status Imaging Status   [facility] Referral placed 4.19.18 EPIC

## 2018-11-20 ENCOUNTER — MYC MEDICAL ADVICE (OUTPATIENT)
Dept: ANESTHESIOLOGY | Facility: CLINIC | Age: 48
End: 2018-11-20

## 2018-11-20 DIAGNOSIS — M96.1 LUMBAR POST-LAMINECTOMY SYNDROME: Primary | ICD-10-CM

## 2018-11-21 RX ORDER — DULOXETIN HYDROCHLORIDE 30 MG/1
CAPSULE, DELAYED RELEASE ORAL
Qty: 90 CAPSULE | Refills: 1 | Status: SHIPPED | OUTPATIENT
Start: 2018-11-21 | End: 2018-12-04

## 2018-11-30 NOTE — PROGRESS NOTES
"[facility]   Shilpa Galicia MD  12/03/2018      Chief Complaint:   Refill Request     History of Present Illness:   Ada Welch is a 47 year old female with a history of Tom-Danlos disease, chronic pain syndrome, and numerous medical problems who presents for a refill request.    Chronic pain  The patient started medical cannabis last year with noted improvement in functionality and quality of life, per her reporting at last office visit on 10/29.  Her PEG was 6. Today, she states that moving into the winter, she has had more joint pain with increased redness and swelling, per patient. The patient also has increased back pain. She expresses concerns for rheumatoid arthritis. The patient last saw Dr. Shipley rheumatology on 10/25 but has not heard from them since. She is wondering about follow up with them on her imaging (see below). Lab work was ordered by rheumatology, but the patient was unaware of this and so has not done it yet. She will plan on doing this today.     Migraines/other headaches  The patient has a longstanding history of migraine headaches and has been working closely with Dr. Noemy Nguyen of neurology. In October, she discussed with her PCP Dr. Rivera that she had recently started on Aimovig. This was helping notably, although did not offer complete resolution of her symptoms. Dr. Rivera recommended that she consider tapering off some of her other medications that could be contributing to weight gain if the Aimovig continued to help her migraines.     Today, she states that the Aimovig has gotten rid of the \"shooting pain\" migraines but have not gotten rid of the pressure from her migraines in the posterior aspect of her head. The pressure is described as \"feeling like something will pop.\" She has not seen Dr. Nguyen since last summer, as she was unsure how often she was supposed to see her. The patient participates in PT at [facility], as they specialize in EDS " patients, but states that this does not help the pressure in the back of her head.      Hair Loss; Thyroid concerns   She notes increased hair loss during showering, brushing her hair, and simply throughout the day. She adds that her scalp is itching. She is wondering if this is related to her thyroid, as she is also experiencing increasing fatigue.     Other concerns discussed:  1) Cotton mouth - The patient is still using Amitriptyline and notes that she is waking with cotton mouth in the mornings. Dr. Martinez originally gave her this for sleep.     2) Multiple organ prolapse - She states that she may need to undergo surgery for vaginal and rectal prolapse in the future.     3) Skin concerns - She has a bump on her bra line on the left side than changes size and itches, per patient. This has been there for one year and a half.     Review of Systems:   Pertinent items are noted in HPI, remainder of complete ROS is negative.      Active Medications:      ACETAMINOPHEN PO, Take 1,000 mg by mouth every 6 hours as needed for pain Take with tramadol, Disp: , Rfl:      albuterol (PROAIR HFA/PROVENTIL HFA/VENTOLIN HFA) 108 (90 BASE) MCG/ACT Inhaler, Inhale 2 puffs into the lungs every 6 hours as needed , Disp: , Rfl:      AMITRIPTYLINE HCL PO, Take 50 mg by mouth At Bedtime , Disp: , Rfl:      buprenorphine (BUTRANS) 10 MCG/HR WK patch, Place 1 patch onto the skin every 7 days, Disp: 4 patch, Rfl: 2     butalbital-acetaminophen-caffeine (FIORICET/ESGIC) -40 MG per tablet, TAKE ONE TABLET BY MOUTH EVERY 4 HOURS AS NEEDED, Disp: , Rfl: 0     cetirizine (ZYRTEC) 10 MG tablet, Take 10 mg by mouth daily , Disp: , Rfl:      COMPRESSION STOCKINGS, 1 each daily 20-30 mmHg Thigh Length measure and fit, color and style per patient preference. Antolin garcia per need., Disp: 3 each, Rfl: 3     DULoxetine (CYMBALTA) 30 MG EC capsule, Take 60mg in the morning and 30mg at bedtime, Disp: 90 capsule, Rfl: 1     eletriptan (RELPAX) 20  MG tablet, Take 1 tablet (20 mg) by mouth at onset of headache for migraine May repeat in 2 hours. Max 4 tablets/24 hours., Disp: 18 tablet, Rfl: 1     EPINEPHrine (EPIPEN/ADRENACLICK/OR ANY BX GENERIC EQUIV) 0.3 MG/0.3ML injection 2-pack, Inject 0.3 mg into the muscle, Disp: , Rfl:      erenumab-aooe (AIMOVIG 140 DOSE) 70 MG/ML injection, Inject 2 mLs (140 mg) Subcutaneous every 30 days, Disp: 2 pen, Rfl: 11     fluticasone (FLONASE) 50 MCG/ACT spray, INSTILL 2 SPRAYS INTO BOTH NOSTRILS DAILY, Disp: 48 mL, Rfl: 3     fluticasone-salmeterol (ADVAIR DISKUS) 250-50 MCG/DOSE diskus inhaler, Inhale 1 puff into the lungs 2 times daily as needed , Disp: , Rfl:      levothyroxine (SYNTHROID) 25 MCG tablet, Take 1 tablet (25 mcg) by mouth daily, Disp: 90 tablet, Rfl: 3     linaclotide (LINZESS) 145 MCG capsule, Take 1 capsule (145 mcg) by mouth every morning (before breakfast) Last refill until GI clinic follow up., Disp: 30 capsule, Rfl: 2     medical cannabis (Patient's own supply.  Not a prescription), Take 1 Dose by mouth See Admin Instructions (This is NOT a prescription, and does not certify that the patient has a qualifying medical condition for medical cannabis.  The purpose of this order is  to document that the patient reports taking medical cannabis.), Disp: , Rfl:      metoclopramide (REGLAN) 5 MG tablet, Take 1-2 tablets (5-10 mg) by mouth every 6 hours as needed, Disp: 180 tablet, Rfl: 1     Multiple Vitamins-Minerals (MULTIVITAMIN PO), Take 1 tablet by mouth daily , Disp: , Rfl:      Nerve Stimulator (CEFALY KIT) KAROLINE, 1 Device daily as needed, Disp: 1 Device, Rfl: 0     omeprazole (PRILOSEC) 40 MG capsule, Take 1 capsule (40 mg) by mouth 2 times daily as needed (prn), Disp: 180 capsule, Rfl: 2     ondansetron (ZOFRAN-ODT) 4 MG ODT tab, Take 1-2 tablets (4-8 mg) by mouth every 12 hours as needed for nausea, Disp: 60 tablet, Rfl: 1     oxyCODONE IR (ROXICODONE) 5 MG tablet, Take 0.5 tablets (2.5 mg) by mouth  every 6 hours as needed for severe pain, Disp: 15 tablet, Rfl: 0     Probiotic Product (PROBIOTIC DAILY PO), Take 2 packets by mouth every morning , Disp: , Rfl:      Propranolol HCl SR Beads 120 MG CP24, Take 120 mg by mouth daily, Disp: 30 capsule, Rfl: 11     rizatriptan (MAXALT-MLT) 5 MG ODT tab, TAKE 1-2 TABLETS (5-10 MG) BY MOUTH AT ONSET OF HEADACHE FOR MIGRAINE (MAX 30 MG IN 24 HOURS), Disp: , Rfl: 6     SUMAtriptan (IMITREX STATDOSE) 6 MG/0.5ML pen injector kit, Inject 0.5 ml (6 mg) subcutaneously at onset of migraine, May repeat in 1 hour , Max 12 mg/24 hrs, Disp: 2 kit, Rfl: 1     tiZANidine (ZANAFLEX) 4 MG tablet, Take 1 tablet (4 mg) by mouth 4 times daily as needed for muscle spasms, Disp: 120 tablet, Rfl: 1     traMADol (ULTRAM) 50 MG tablet, Take 1 tablet (50 mg) by mouth every 6 hours as needed for severe pain, Disp: 60 tablet, Rfl: 1     VITAMIN D, CHOLECALCIFEROL, PO, Take 2,000 Units by mouth daily, Disp: , Rfl:      ZONISAMIDE PO, Take 75 mg by mouth daily, Disp: , Rfl:      vitamin B complex with vitamin C (VITAMIN  B COMPLEX) TABS tablet, Take 1 tablet by mouth daily, Disp: , Rfl:       Allergies:   Bee venom; No clinical screening - see comments; Chicken-derived products (egg); Compazine [prochlorperazine]; Food; Gabapentin; Gluten meal; Pregabalin; Topiramate; Wheat; and Wheat bran      Past Medical History:  Allergic rhinitis   Blood clotting disorder   Chronic tonsillitis   Tom-Danlos disease   Eosinophilic esophagitis   Gastroesophageal reflux disease (GERD)   Hiatal hernia   Hoarseness   Irritable bowel syndrome (IBS) with constipation   Nasal polyps   Autonomic nervous system disorder   Pneumonia   Acquired hypothyroidism   Chronic pain syndrome   Postlaminectomy syndrome, lumbar region   Cervicalgia   Slow transit constipation   Cardiac device in situ   Anxiety disorder  Astigmatism  Chronic sinusitis with recurrent bronchitis  Arthritis, multiple sites  Cyst of fallopian  "tube  Depression  Deviated nasal septum  Essential hypertension with goal blood pressure less than 140/90  Head and neck lymphadenopathy  Hepatomegaly  Keratoconjunctivitis sicca   Liver lesion  Lumbar degenerative disc disease  Lumbar radiculopathy  Intractable chronic migraine without aura and without status migrainosus  Nasal turbinate hypertrophy  Non compliance with medical treatment  Obese  Opioid type dependence, continuous   Other acute postoperative pain  Pain disorder associated with psychological and physical factors  Presbyopia  Spondylolisthesis of lumbar region  Uterine endometriosis     Past Surgical History:  Adenoidectomy   Nasal septum repair   Spinal fusion L4-S1  Mole biopsy   Lymph node biopsy   Nasal/sinus polypectomy   Tonsillectomy  Tubal ligation     Family History:   EDS - Mother, Sister  Spinal cancer - Father   Migraines - Father, Sister   Diabetes - Maternal grandmother, Paternal grandmother   Heart disease - Maternal grandmother   Hypertension - Maternal grandmother   Asthma - Sister, Son   Thyroid disease - Sister       Social History:   The patient was alone.  Smoking Status: Former 0.2 PPD for 10 years   Smokeless Tobacco: Never   Alcohol Use: Yes       Physical Exam:   /81 (BP Location: Right arm, Patient Position: Sitting)  Pulse 64  Ht 1.702 m (5' 7.01\")  Wt 101.2 kg (223 lb)  LMP 11/18/2018 (Exact Date)  SpO2 98%  Breastfeeding? No  BMI 34.92 kg/m2     Constitutional: Healthy, alert, no distress and cooperative.   Head: Normocephalic. No masses, lesions, tenderness or abnormalities. No dandruff on examination of scalp. There are no patches of hair loss or areas with redness. No alopecia areata on scalp, and no ulcers or lesions. Pull test with only one hair pulled.  Skin: Left side: There is a 5x4mm oval macular papular lesion that is slightly pink, and appears slightly scarred. It appears whitish pink and there is no drainage.   Joints: She has pain in the third " right and left hands over the distal metacarpal bone but not in metacarpophalangeal joint.  There is no effusion or warmth.  There is left first carpometacarpal joint tenderness without effusion or warmth.    Recent Imaging:  3 views bilateral hand radiographs 10/25/2018 8:04 PM  Impression:  1. No acute osseous abnormality.  2. No substantial degenerative change.     DAVE MARTELASHI    2 views bilateral hip/pelvis radiographs 10/25/2018 8:03 PM  Impression:  1. No acute osseous abnormality.  2. No substantial degenerative change.     DAVE STACEY    2 views bilateral knee(s) radiographs 10/25/2018 8:01 PM  Impression:  1. No acute osseous abnormality.  2. No substantial degenerative change.     DAVE IBARRA    Exam: XR CHEST 2 VW, 10/25/2018 7:39 PM  Impression: Clear chest.     I have personally reviewed the examination and initial interpretation  and I agree with the findings.     RUBI MOLINA MD     Assessment and Plan:  Acquired hypothyroidism  Scalp looks healthy on exam and I discussed with her that I saw no evidence for a scalp problem that would cause hair loss, or an autoimmune disease. Her TSH was therapeutic in April 2018, but with her fatigue we will recheck this today. She deferred dermatologist referral today.    - TSH with free T4 reflex    Arthralgias  I have asked the patient to have the labs drawn as suggested per rheumatology in October:  - Complement C3  - Complement C4  - CRP inflammation  - Cyclic Citrullinated Peptide Antibody IgG  - Rheumatoid factor  - Protein Immunofixation Serum  - Protein electrophoresis  - ANCA Vasculitis panel  - Anti Nuclear Lis IgG by IFA with Reflex  - Immunoglobulin G subclasses  - IgG    Arthritis involving multiple sites  I reviewed with her the imaging done with rheumatology, and we discovered that labs had been ordered as well that were not completed. The patient will complete these today and then contact Dr. Shipley in Rheumatology for follow up  on results. I will notify Dr. Shipley of the fact that she had been unaware that she had labs.       Migraines   While her painful migraine-type headaches have subsided with Aimovig, her headaches at the posterior aspect of her head, causing the sensation of increased pressure to that area, have not.  She has a complex headache history and I have requested that she follow up with her neurologist, Dr. Nguyen, for further evaluation and cares for this. She states that she will make an appointment as soon as possible.     Skin lesion on left upper back  Does not look premalignant, it appears to be a scar.  Will monitor.  She will continue to monitor her skin spot on her left side. Referral to dermatology not necessary at this time.     Follow-up: With Dr. Her as needed      I spent a total of 25 minutes face-to-face with Ada Welch during today's office visit.  Over 50% of this time was spent counseling the patient and/or coordinating care regarding Multiple issues.  See note for details.    Scribe Disclosure:   I, Lisandra Guillen, am serving as a scribe to document services personally performed by Shilpa Galicia MD at this visit, based upon the provider's statements to me. All documentation has been reviewed by the aforementioned provider prior to being entered into the official medical record.     Portions of this medical record were completed by a scribe. UPON MY REVIEW AND AUTHENTICATION BY ELECTRONIC SIGNATURE, this confirms (a) I performed the applicable clinical services, and (b) the record is accurate.     Shilpa Galicia

## 2018-12-01 DIAGNOSIS — K59.04 CHRONIC IDIOPATHIC CONSTIPATION: ICD-10-CM

## 2018-12-01 NOTE — TELEPHONE ENCOUNTER
" linaclotide (LINZESS) 145 MCG capsule    Last Written Prescription Date:  8/24/18  Last Fill Quantity: 30,   # refills: 2  Last Office Visit : 8/8/18  Future Office visit: none      8/8/18--Continue Linzess    Routed because:  Per last rf. \".Last refill until GI clinic follow up.\" Last visit 8/8/18. rf?  "

## 2018-12-03 ENCOUNTER — OFFICE VISIT (OUTPATIENT)
Dept: PULMONOLOGY | Facility: CLINIC | Age: 48
End: 2018-12-03
Attending: ALLERGY & IMMUNOLOGY
Payer: COMMERCIAL

## 2018-12-03 ENCOUNTER — OFFICE VISIT (OUTPATIENT)
Dept: INTERNAL MEDICINE | Facility: CLINIC | Age: 48
End: 2018-12-03
Payer: COMMERCIAL

## 2018-12-03 ENCOUNTER — PRE VISIT (OUTPATIENT)
Dept: PULMONOLOGY | Facility: CLINIC | Age: 48
End: 2018-12-03

## 2018-12-03 VITALS
BODY MASS INDEX: 35 KG/M2 | HEART RATE: 64 BPM | HEIGHT: 67 IN | WEIGHT: 223 LBS | DIASTOLIC BLOOD PRESSURE: 81 MMHG | OXYGEN SATURATION: 98 % | SYSTOLIC BLOOD PRESSURE: 113 MMHG

## 2018-12-03 VITALS
SYSTOLIC BLOOD PRESSURE: 113 MMHG | BODY MASS INDEX: 35 KG/M2 | HEIGHT: 67 IN | OXYGEN SATURATION: 98 % | HEART RATE: 64 BPM | DIASTOLIC BLOOD PRESSURE: 81 MMHG | WEIGHT: 223 LBS

## 2018-12-03 DIAGNOSIS — G89.4 PAIN SYNDROME, CHRONIC: ICD-10-CM

## 2018-12-03 DIAGNOSIS — E03.9 ACQUIRED HYPOTHYROIDISM: Primary | ICD-10-CM

## 2018-12-03 DIAGNOSIS — J32.0 CHRONIC MAXILLARY SINUSITIS: Primary | ICD-10-CM

## 2018-12-03 DIAGNOSIS — E03.9 ACQUIRED HYPOTHYROIDISM: ICD-10-CM

## 2018-12-03 DIAGNOSIS — Q79.60 EHLERS-DANLOS SYNDROME: ICD-10-CM

## 2018-12-03 DIAGNOSIS — M12.9 ARTHRITIS INVOLVING MULTIPLE SITES: ICD-10-CM

## 2018-12-03 LAB
CRP SERPL-MCNC: 4.1 MG/L (ref 0–8)
TSH SERPL DL<=0.005 MIU/L-ACNC: 3.73 MU/L (ref 0.4–4)

## 2018-12-03 PROCEDURE — G0463 HOSPITAL OUTPT CLINIC VISIT: HCPCS | Mod: ZF

## 2018-12-03 RX ORDER — AZITHROMYCIN 250 MG/1
TABLET, FILM COATED ORAL
Qty: 6 TABLET | Refills: 0 | Status: SHIPPED | OUTPATIENT
Start: 2018-12-03 | End: 2019-01-15

## 2018-12-03 RX ORDER — PREDNISONE 20 MG/1
20 TABLET ORAL 2 TIMES DAILY
Qty: 10 TABLET | Refills: 0 | Status: SHIPPED | OUTPATIENT
Start: 2018-12-03 | End: 2019-01-15

## 2018-12-03 ASSESSMENT — ENCOUNTER SYMPTOMS
DIFFICULTY URINATING: 1
HEADACHES: 1
RECTAL PAIN: 0
POOR WOUND HEALING: 1
HEMATURIA: 0
BLOATING: 1
COUGH: 1
DYSURIA: 1
MUSCLE CRAMPS: 1
WEIGHT LOSS: 0
ALTERED TEMPERATURE REGULATION: 1
JAUNDICE: 0
STIFFNESS: 1
EYE IRRITATION: 1
COUGH DISTURBING SLEEP: 1
SMELL DISTURBANCE: 1
EYE REDNESS: 1
WEAKNESS: 1
HYPERTENSION: 0
NAUSEA: 1
MYALGIAS: 1
ORTHOPNEA: 1
DYSPNEA ON EXERTION: 1
CHILLS: 1
SHORTNESS OF BREATH: 1
FEVER: 1
JOINT SWELLING: 1
FLANK PAIN: 1
POSTURAL DYSPNEA: 1
DISTURBANCES IN COORDINATION: 1
INCREASED ENERGY: 1
HOARSE VOICE: 1
PARALYSIS: 0
DECREASED APPETITE: 1
VOMITING: 1
POLYPHAGIA: 1
DOUBLE VISION: 1
HEARTBURN: 1
PALPITATIONS: 1
SYNCOPE: 0
SINUS PAIN: 1
TASTE DISTURBANCE: 1
BLOOD IN STOOL: 0
TINGLING: 1
SPEECH CHANGE: 1
SORE THROAT: 1
LIGHT-HEADEDNESS: 1
BACK PAIN: 1
DIZZINESS: 1
HYPOTENSION: 1
POLYDIPSIA: 1
DIARRHEA: 1
MUSCLE WEAKNESS: 1
WEIGHT GAIN: 0
BRUISES/BLEEDS EASILY: 1
LOSS OF CONSCIOUSNESS: 0
EYE PAIN: 0
MEMORY LOSS: 1
TREMORS: 1
SLEEP DISTURBANCES DUE TO BREATHING: 1
SPUTUM PRODUCTION: 1
LEG PAIN: 1
TROUBLE SWALLOWING: 1
WHEEZING: 1
SWOLLEN GLANDS: 1
SINUS CONGESTION: 1
NECK PAIN: 1
EYE WATERING: 0
HEMOPTYSIS: 0
NAIL CHANGES: 0
SEIZURES: 0
CONSTIPATION: 1
SNORES LOUDLY: 1
HALLUCINATIONS: 0
NECK MASS: 1
ARTHRALGIAS: 1
NIGHT SWEATS: 1
SKIN CHANGES: 0
EXERCISE INTOLERANCE: 1
NUMBNESS: 1
ABDOMINAL PAIN: 1
FATIGUE: 1
BOWEL INCONTINENCE: 0

## 2018-12-03 ASSESSMENT — PAIN SCALES - GENERAL: PAINLEVEL: SEVERE PAIN (7)

## 2018-12-03 NOTE — PROGRESS NOTES
Reason for Visit  Ada Welch is a 47 year old female with a h/o Tom Danlos syndrome, eosinophilic esophagitis, nasal polyps, IBS, chronic pain syndrome, and seasonal allergies who is here for evaluation of allergies.  Allergy HPI    The patient presents wondering if there is a unifying diagnosis that is allergic in etiology to a variety of symptoms and lab/imaging findings.  Specifically she wondered about a relationship between her pulmonary nodules, nasal polyps, chronic sinusitis, and reactive lymphadenopathy.      She reports that she has had chronic sinus drainage ever since her septoplasty in 2016.  She has chronic sinus congestion with headache and rhinitis.  No associated cough.  She takes zyrtec daily and flonase daily with limited benefit.  Has been on steroids in the past (overall doesn't tolerate) and did not note much improvement.      She also reports SOB with wheezing several times per week.  Rarely (few times a month) takes her albuterol.  Previously was on Advair but after having normal spirometry was taken off and has it available as a PRN medication.  Of note, she reports being on ICS when spirometry was performed.      She has biopsy proven eosinophilic esophagitis and has been on omeprazole.  She also has a hiatal hernia.  Last bx on 10/22 was negative for Eos.      She does have seasonal allergies that flare in both the spring and fall.  Symptoms include SOB/wheezing, rhinitis, itchy eyes.  She has had allergies test previously in [place] and was allergic to wheat, milk, eggs.  She stopped dairy ~6 months ago.  Reacts to dust.        She moved to [place] ~1.5 years ago from [place].  Lives in [place].  Previous intermittent light smoker (0.2PPD 10 years).   with 5 children.  Recently moved to a new rental home and symptoms have improved some.     Current Outpatient Prescriptions   Medication     ACETAMINOPHEN PO     albuterol (PROAIR HFA/PROVENTIL HFA/VENTOLIN HFA) 108 (90 BASE) MCG/ACT  Inhaler     AMITRIPTYLINE HCL PO     buprenorphine (BUTRANS) 10 MCG/HR WK patch     butalbital-acetaminophen-caffeine (FIORICET/ESGIC) -40 MG per tablet     cetirizine (ZYRTEC) 10 MG tablet     COMPRESSION STOCKINGS     DULoxetine (CYMBALTA) 30 MG EC capsule     eletriptan (RELPAX) 20 MG tablet     EPINEPHrine (EPIPEN/ADRENACLICK/OR ANY BX GENERIC EQUIV) 0.3 MG/0.3ML injection 2-pack     erenumab-aooe (AIMOVIG 140 DOSE) 70 MG/ML injection     famotidine (PEPCID) 20 MG tablet     fluticasone (FLONASE) 50 MCG/ACT spray     fluticasone-salmeterol (ADVAIR DISKUS) 250-50 MCG/DOSE diskus inhaler     levothyroxine (SYNTHROID) 25 MCG tablet     linaclotide (LINZESS) 145 MCG capsule     medical cannabis (Patient's own supply.  Not a prescription)     metoclopramide (REGLAN) 5 MG tablet     Multiple Vitamins-Minerals (MULTIVITAMIN PO)     Nerve Stimulator (CEFALY KIT) KAROLINE     omeprazole (PRILOSEC) 40 MG capsule     ondansetron (ZOFRAN-ODT) 4 MG ODT tab     oxyCODONE IR (ROXICODONE) 5 MG tablet     Probiotic Product (PROBIOTIC DAILY PO)     Propranolol HCl SR Beads 120 MG CP24     rizatriptan (MAXALT-MLT) 5 MG ODT tab     SUMAtriptan (IMITREX STATDOSE) 6 MG/0.5ML pen injector kit     tiZANidine (ZANAFLEX) 4 MG tablet     traMADol (ULTRAM) 50 MG tablet     vitamin B complex with vitamin C (VITAMIN  B COMPLEX) TABS tablet     VITAMIN D, CHOLECALCIFEROL, PO     ZONISAMIDE PO     No current facility-administered medications for this visit.      Allergies   Allergen Reactions     Bee Venom Anaphylaxis, Difficulty breathing, Palpitations, Shortness Of Breath and Visual Disturbance     Patient does carry Epi-Pen     No Clinical Screening - See Comments Anaphylaxis     Chicken-Derived Products (Egg) Diarrhea and Nausea     Patient will self monitor  Other reaction(s): Nausea  Patient will self monitor  Other reaction(s): GI intolerance     Compazine [Prochlorperazine] Other (See Comments)     Extreme jittery     Food      Milk      Gabapentin      Gluten Meal Other (See Comments)     Pregabalin Other (See Comments)     Topiramate      Wheat Diarrhea     Wheat Bran Nausea     Patient will self monitor  Other reaction(s): Nausea  Patient will self monitor     Social History     Social History     Marital status:      Spouse name: Carry     Number of children: 5     Years of education: N/A     Occupational History     Not on file.     Social History Main Topics     Smoking status: Former Smoker     Packs/day: 0.20     Years: 10.00     Types: Cigarettes     Start date: 1/1/1991     Quit date: 12/1/2013     Smokeless tobacco: Never Used      Comment: I somked on and off during specified dates.     Alcohol use Yes      Comment: occasional     Drug use: Yes     Special: Marijuana      Comment: medical marijuana     Sexual activity: Yes     Partners: Male     Birth control/ protection: Female Surgical     Other Topics Concern     Parent/Sibling W/ Cabg, Mi Or Angioplasty Before 65f 55m? 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     Chronic sinusitis 00/2007    Diagnosed with chronic pansinusitis 2016     Chronic tonsillitis 1980    Had tonsils removed 02/1981, rheumatic fever     Tom-Danlos disease      Eosinophilic esophagitis 08/2018     Gastroesophageal reflux disease 3/1/2017    I have large hiatal hernia     Hernia, abdominal 10/2016    Hiatal Hernia     Hiatal hernia 2016    Have adeline hiatel hernia     Hoarseness Unsure    It comes and goes     IBS (irritable bowel syndrome) with constipation     improved on Linzess     Immunosuppression  (H) 3/1/2015    Common with Tom Danlos Syndrome     Migraines 1/1/2007    Unsure of exact date     Nasal polyps 2013    Were revoved 03/2017, benign     Other nervous system complications 1/2014    Autonomic nervous system disorder, neuralgia, neuropathy     Pneumonia Unsure    Have had pneumonia several times     Swelling, mass, or lump in head and neck 08/2016    Lymph node has been growing for last year     Thyroid disease 01/01/2008     Past Surgical History:   Procedure Laterality Date     ADENOIDECTOMY  1981     AS REPAIR OF NASAL SEPTUM       BACK SURGERY  12/09/2013  10/01/2014    Spinal fusion L4-S1, L5 moving 5/8ths in. Remove screws/rods     BIOPSY  01/01/2008 & 3/2017    mole biopsy, lymph node biopsy     COLONOSCOPY  3/2017    Colonoscopy and GI     ESOPHAGOSCOPY, GASTROSCOPY, DUODENOSCOPY (EGD), COMBINED N/A 8/3/2018    Procedure: COMBINED ESOPHAGOSCOPY, GASTROSCOPY, DUODENOSCOPY (EGD), BIOPSY SINGLE OR MULTIPLE;;  Surgeon: Juan Woodson MD;  Location:  GI     ESOPHAGOSCOPY, GASTROSCOPY, DUODENOSCOPY (EGD), COMBINED N/A 10/22/2018    Procedure: COMBINED ESOPHAGOSCOPY, GASTROSCOPY, DUODENOSCOPY (EGD), BIOPSY SINGLE OR MULTIPLE;  Surgeon: Sadia Carolina MD;  Location: [facility]     NASAL/SINUS POLYPECTOMY  2017    Polyps were benign     TONSILLECTOMY       TONSILLECTOMY  1981     TUBAL LIGATION       Family History   Problem Relation Age of Onset     Connective Tissue Disorder Mother      eds     Connective Tissue Disorder Sister      eds     Cancer Father      Spinal tumor grew into spinal cord, went in brain     Migraines Father      Diabetes Maternal Grandmother      Elderly diabetes     Heart Disease Maternal Grandmother      Hypertension Maternal Grandmother      Diabetes Paternal Grandmother      Elderly diabetes     Diabetes Paternal Grandfather      Elderly diabetes     Asthma Sister      Pediatric Asthma     Migraines Sister      Asthma Son      Pediatric Asthma     Thyroid Disease Sister   "    ,     Migraines Sister      Migraines Sister      Breast Cancer No family hx of          ROS   A complete ROS was otherwise negative except as noted in the HPI and found at the end of the note.  /81  Pulse 64  Ht 1.702 m (5' 7\")  Wt 101.2 kg (223 lb)  LMP 11/18/2018 (Exact Date)  SpO2 98%  BMI 34.93 kg/m2  Exam:   GENERAL APPEARANCE: Well developed, well nourished, alert, and in no apparent distress.  EYES: EOMI, conjunctiva clear non-injected  HENT: Nasal mucosa with no edema and no discharge. No nasal polyps.  Mildly inflamed on the left.    EARS: Canals clear, TMs normal.  MOUTH: Oral mucosa is moist, without any lesions, no tonsillar enlargement, no oropharyngeal exudate.  Cervical LNs are very mildly enlarged bilaterally.   RESP: Good air flow throughout.  No crackles. No rhonchi. No wheezes.  CV: Normal S1, S2, regular rhythm, normal rate. No murmur.  No rub. No gallop. No LE edema.   MS: Extremities normal. No clubbing. No cyanosis.  SKIN: No rashes noted.  NEURO: Speech normal, normal strength and tone, normal gait and stance  PSYCH: Normal mentation, orientation to person, place, and time.  Results:  Ab Eos 7/6/18 - 0.4    CT Head/neck 7/6/18  Impression:  1. No acute intracranial pathology.  2. Bilateral maxillary and ethmoid sinus mucosal thickening with  frothy debris within the left maxillary sinus, suggestive of acute  Sinusitis.    CT chest 4/26/18      IMPRESSION: Stable pulmonary nodules since 1/6/2017. These are  statistically benign.    Assessment and plan:   # Chronic sinusitis  Chronic sinus symptoms are unlikely related to a greater syndromic unifying diagnosis.  Unlikely to be Ch[place]-Ang[place]lica given rather mild respiratory symptoms.  Previous septal surgery with polyps noted in 2016.  Most recent CT head/neck shows sinusitis with debris but does not appear to have polyps.  The presence of chronic sinusitis may explain her reactive lymphadenopathy since she likely has post nasal " drip which may lead to inflammation of the cervical/submadibular LNs.  It is unclear if the sinus symptoms are associated with allergies.    # Eosinophil esophagitis  Biopsy proven and follows with GI.  Also has large hiatal hernia.  Improvement of eosinophilic esophagitis after initiation of aggressive PPI suggests that etiology is reflux related and unlikely associated with an allergy.    # Lung nodule  Given stability since at least 2016 are likely benign (old infection/scar)  Recommendations:  - Obtain old allergy testing from [facility], if inadequate may need to repeat testing at future visit   - Obtained and borderline positive for several allergens  - Prednisone and azithromycin to treat sinusitis  - GI to manage Eosinophilic esophagitis  RTC:  4 weeks with SPT (patient to hold antihistamines for week b/f test)  Patient discussed with Dr. Rodriguez who agrees with the plan.  Sam Owens MD, MPH  Pulmonary & Critical Care, [facility]  [phone]        Physician Attestation   I, Vahe Rodriguez, saw this patient with the resident and agree with the resident/fellow's findings and plan of care as documented in the note.  Of note I reviewed her visit with an outside allergy/immunology specialist and they looked at her immune response to vaccines which was appropriate per their note and they did an HIV test and lymphocyte proliferation to mitogen study and it was normal.         Vahe Rodriguez MD  Date of Service (when I saw the patient): Dec 3, 2018        Answers for HPI/ROS submitted by the patient on 12/3/2018   General Symptoms: Yes  Skin Symptoms: Yes  HENT Symptoms: Yes  EYE SYMPTOMS: Yes  HEART SYMPTOMS: Yes  LUNG SYMPTOMS: Yes  INTESTINAL SYMPTOMS: Yes  URINARY SYMPTOMS: Yes  GYNECOLOGIC SYMPTOMS: No  BREAST SYMPTOMS: No  SKELETAL SYMPTOMS: Yes  BLOOD SYMPTOMS: Yes  NERVOUS SYSTEM SYMPTOMS: Yes  MENTAL HEALTH SYMPTOMS: No  Fever: Yes  Loss of appetite: Yes  Weight loss: No  Weight gain:  No  Fatigue: Yes  Night sweats: Yes  Chills: Yes  Increased stress: No  Excessive hunger: Yes  Excessive thirst: Yes  Feeling hot or cold when others believe the temperature is normal: Yes  Loss of height: No  Post-operative complications: Yes  Surgical site pain: Yes  Hallucinations: No  Change in or Loss of Energy: Yes  Hyperactivity: No  Confusion: Yes  Changes in hair: Yes  Changes in moles/birth marks: No  Itching: Yes  Rashes: No  Changes in nails: No  Acne: No  Hair in places you don't want it: No  Change in facial hair: No  Warts: No  Non-healing sores: Yes  Scarring: Yes  Flaking of skin: No  Color changes of hands/feet in cold : Yes  Sun sensitivity: Yes  Skin thickening: No  Ear pain: Yes  Ear discharge: No  Hearing loss: Yes  Tinnitus: Yes  Nosebleeds: No  Congestion: Yes  Sinus pain: Yes  Trouble swallowing: Yes   Voice hoarseness: Yes  Mouth sores: No  Sore throat: Yes  Tooth pain: No  Gum tenderness: No  Bleeding gums: No  Change in taste: Yes  Change in sense of smell: Yes  Dry mouth: Yes  Hearing aid used: No  Neck lump: Yes  Eye pain: No  Vision loss: Yes  Dry eyes: Yes  Watery eyes: No  Eye bulging: No  Double vision: Yes  Flashing of lights: Yes  Spots: Yes  Floaters: Yes  Redness: Yes  Crossed eyes: No  Tunnel Vision: Yes  Yellowing of eyes: No  Eye irritation: Yes  Cough: Yes  Sputum or phlegm: Yes  Coughing up blood: No  Difficulty breating or shortness of breath: Yes  Snoring: Yes  Wheezing: Yes  Difficulty breathing on exertion: Yes  Nighttime Cough: Yes  Difficulty breathing when lying flat: Yes  Chest pain or pressure: Yes  Fast or irregular heartbeat: Yes  Pain in legs with walking: Yes  Trouble breathing while lying down: Yes  Fingers or toes appear blue: Yes  High blood pressure: No  Low blood pressure: Yes  Fainting: No  Murmurs: No  Pacemaker: No  Varicose veins: No  Edema or swelling: Yes  Wake up at night with shortness of breath: Yes  Light-headedness: Yes  Exercise intolerance:  Yes  Heart burn or indigestion: Yes  Nausea: Yes  Vomiting: Yes  Abdominal pain: Yes  Bloating: Yes  Constipation: Yes  Diarrhea: Yes  Blood in stool: No  Black stools: No  Rectal or Anal pain: No  Fecal incontinence: No  Yellowing of skin or eyes: No  Vomit with blood: No  Change in stools: Yes  Trouble holding urine or incontinence: Yes  Pain or burning: Yes  Trouble starting or stopping: Yes  Increased frequency of urination: Yes  Blood in urine: No  Decreased frequency of urination: No  Frequent nighttime urination: Yes  Flank pain: Yes  Difficulty emptying bladder: Yes  Back pain: Yes  Muscle aches: Yes  Neck pain: Yes  Swollen joints: Yes  Joint pain: Yes  Bone pain: Yes  Muscle cramps: Yes  Muscle weakness: Yes  Joint stiffness: Yes  Bone fracture: No  Anemia: No  Swollen glands: Yes  Easy bleeding or bruising: Yes  Trouble with coordination: Yes  Dizziness or trouble with balance: Yes  Fainting or black-out spells: No  Memory loss: Yes  Headache: Yes  Seizures: No  Speech problems: Yes  Tingling: Yes  Tremor: Yes  Weakness: Yes  Difficulty walking: Yes  Paralysis: No  Numbness: Yes

## 2018-12-03 NOTE — MR AVS SNAPSHOT
After Visit Summary   12/3/2018    Ada Welch    MRN: [id number]           Patient Information     Date Of Birth          1970        Visit Information        Provider Department      12/3/2018 10:25 AM Shilpa Galicia MD [facility]        Today's Diagnoses     Acquired hypothyroidism    -  1    Tom-Danlos syndrome        Arthritis involving multiple sites        Pain syndrome, chronic          Care Instructions    Primary Care Center Medication Refill Request Information:  * Please contact your pharmacy regarding ANY request for medication refills.  ** [facility] Prescription Fax = [phone]  * Please allow 3 business days for routine medication refills.  * Please allow 5 business days for controlled substance medication refills.     Primary Care Center Test Result notification information:  *You will be notified with in 7-10 days of your appointment day regarding the results of your test.  If you are on MyChart you will be notified as soon as the provider has reviewed the results and signed off on them.    [facility]: [phone]       1.  Please have blood drawn today for Rheumatology orders from October 2018              Follow-ups after your visit        Your next 10 appointments already scheduled     Dec 03, 2018 11:45 AM CST   LAB with  LAB   [facility] ([facility])    [address] [place] [id number] [phone]           Please do not eat 10-12 hours before your appointment if you are coming in fasting for labs on lipids, cholesterol, or glucose (sugar). This does not apply to pregnant women. Water, hot tea and black coffee (with nothing added) are okay. Do not drink other fluids, diet soda or chew gum.            Dec 03, 2018  1:15 PM CST   (Arrive by 1:00 PM)   New Allergy with Vahe Rodriguez MD   [facility] ([facility])     [address]  [address]  [facility] [id number] [phone]           Do not take anti-histamines or Zantac for seven day prior to your appointment.            Dec 03, 2018  6:30 PM CST   (Arrive by 6:15 PM)   Return Visit with Zoe Barahona MD   [facility] ([facility] [place])    [address] [place] [id number] [phone]            Dec 06, 2018 11:40 AM CST   (Arrive by 11:25 AM)   DARIA For Women Only with Maravilla Do Scheralchip, PT   [facility] ([facility] [place]  )    [address]  [place] [address]   [phone]            Dec 13, 2018  9:40 AM CST   DARIA For Women Only with Maravilla Do Scheralchip, PT   [facility] [place] [facility] ([facility] [place]  )    [address]  [place] [address]   [phone]            Dec 20, 2018 12:00 PM CST   (Arrive by 11:45 AM)   New Weight Management Visit with Laxmi Haider PA-C   [facility] ([facility] [place] [facility])    [address] [place] [id number] [phone]            Dec 20, 2018  1:00 PM CST   (Arrive by 12:45 PM)   New Weight Management Visit with Daylin Adams RD   [facility] ([facility])    [address] [place] [id number] [phone]              Future tests that were ordered for you today     Open Future Orders        Priority Expected Expires Ordered    TSH with free T4 reflex Routine 12/3/2018 12/17/2018 12/3/2018            Who to contact     Please call your clinic at [phone] to:    Ask questions about your health    Make or cancel appointments    Discuss your medicines    Learn about your test results    Speak to your doctor            Additional Information About Your Visit        MyChart Information     [employer] gives you secure  "access to your electronic health record. If you see a primary care provider, you can also send messages to your care team and make appointments. If you have questions, please call your primary care clinic.  If you do not have a primary care provider, please call [phone] and they will assist you.      [facility] [employer] is an electronic gateway that provides easy, online access to your medical records. With [facility] [employer], you can request a clinic appointment, read your test results, renew a prescription or communicate with your care team.     To access your existing account, please contact your [facility] or call [phone] for assistance.        Care EveryWhere ID     This is your Care EveryWhere ID. This could be used by other organizations to access your [place] medical records  [id number]        Your Vitals Were     Pulse Height Last Period Pulse Oximetry Breastfeeding? BMI (Body Mass Index)    64 1.702 m (5' 7.01\") 11/18/2018 (Exact Date) 98% No 34.92 kg/m2       Blood Pressure from Last 3 Encounters:   12/03/18 113/81   11/08/18 119/69   10/29/18 108/73    Weight from Last 3 Encounters:   12/03/18 101.2 kg (223 lb)   10/29/18 101.2 kg (223 lb 1.7 oz)   10/25/18 101.2 kg (223 lb)              We Performed the Following     ANCA Vasculitis panel     Anti Nuclear Lis IgG by IFA with Reflex     Complement C3     Complement C4     CRP inflammation     Cyclic Citrullinated Peptide Antibody IgG     IgA     IgM     Immunofixation ELP     Immunoglobulin G subclasses     Protein electrophoresis     Rheumatoid factor        Primary Care Provider Office Phone # Fax #    Ellyn Rivera [phone]       [address] [address]        Equal Access to Services     CHRISTINA GUY : Ashutosh Slater, herve oakes, odette garcia. So [facility] [phone].    ATENCIÓN: Si sheyla browning a sweeney " disposición servicios gratuitos de asistencia lingüística. Isaías carr [phone].    We comply with applicable federal civil rights laws and [place] laws. We do not discriminate on the basis of race, color, national origin, age, disability, sex, sexual orientation, or gender identity.            Thank you!     Thank you for choosing [facility]  for your care. Our goal is always to provide you with excellent care. Hearing back from our patients is one way we can continue to improve our services. Please take a few minutes to complete the written survey that you may receive in the mail after your visit with us. Thank you!             Your Updated Medication List - Protect others around you: Learn how to safely use, store and throw away your medicines at www.disposemymeds.org.          This list is accurate as of 12/3/18 11:02 AM.  Always use your most recent med list.                   Brand Name Dispense Instructions for use Diagnosis    ACETAMINOPHEN PO      Take 1,000 mg by mouth every 6 hours as needed for pain Take with tramadol        ADVAIR DISKUS 250-50 MCG/DOSE inhaler   Generic drug:  fluticasone-salmeterol      Inhale 1 puff into the lungs 2 times daily as needed        albuterol 108 (90 Base) MCG/ACT inhaler    PROAIR HFA/PROVENTIL HFA/VENTOLIN HFA     Inhale 2 puffs into the lungs every 6 hours as needed        AMITRIPTYLINE HCL PO      Take 50 mg by mouth At Bedtime        buprenorphine 10 MCG/HR WK patch    BUTRANS    4 patch    Place 1 patch onto the skin every 7 days    Postlaminectomy syndrome of lumbar region       butalbital-acetaminophen-caffeine -40 MG tablet    FIORICET/ESGIC     TAKE ONE TABLET BY MOUTH EVERY 4 HOURS AS NEEDED        CEFALY KIT Pauly     1 Device    1 Device daily as needed    Intractable chronic migraine without aura and without status migrainosus       cetirizine 10 MG tablet    zyrTEC     Take 10 mg by mouth daily        COMPRESSION STOCKINGS     3  each    1 each daily 20-30 mmHg Thigh Length measure and fit, color and style per patient preference. Doshar n jose per need.    Orthostatic hypotension       DULoxetine 30 MG capsule    CYMBALTA    90 capsule    Take 60mg in the morning and 30mg at bedtime    Lumbar post-laminectomy syndrome       eletriptan 20 MG tablet    RELPAX    18 tablet    Take 1 tablet (20 mg) by mouth at onset of headache for migraine May repeat in 2 hours. Max 4 tablets/24 hours.    Chronic daily headache, Intractable migraine without aura and with status migrainosus       EPINEPHrine 0.3 MG/0.3ML injection 2-pack    EPIPEN/ADRENACLICK/or ANY BX GENERIC EQUIV     Inject 0.3 mg into the muscle        erenumab-aooe 70 MG/ML injection    AIMOVIG 140 DOSE    2 pen    Inject 2 mLs (140 mg) Subcutaneous every 30 days    Intractable chronic migraine without aura and without status migrainosus       famotidine 20 MG tablet    PEPCID    90 tablet    Take 1 tablet (20 mg) by mouth At Bedtime    Hiatal hernia, Mast cell disorder       fluticasone 50 MCG/ACT nasal spray    FLONASE    48 mL    INSTILL 2 SPRAYS INTO BOTH NOSTRILS DAILY    Chronic rhinitis, unspecified type       levothyroxine 25 MCG tablet    SYNTHROID    90 tablet    Take 1 tablet (25 mcg) by mouth daily    Hypothyroidism, unspecified type       linaclotide 145 MCG capsule    LINZESS    30 capsule    Take 1 capsule (145 mcg) by mouth every morning (before breakfast) Last refill until GI clinic follow up.    Chronic idiopathic constipation       medical cannabis (Patient's own supply.  Not a prescription)      Take 1 Dose by mouth See Admin Instructions (This is NOT a prescription, and does not certify that the patient has a qualifying medical condition for medical cannabis.  The purpose of this order is  to document that the patient reports taking medical cannabis.)        metoclopramide 5 MG tablet    REGLAN    180 tablet    Take 1-2 tablets (5-10 mg) by mouth every 6 hours as needed     Intractable chronic migraine without aura and without status migrainosus       MULTIVITAMIN PO      Take 1 tablet by mouth daily        omeprazole 40 MG DR capsule    priLOSEC    180 capsule    Take 1 capsule (40 mg) by mouth 2 times daily as needed (prn)    Eosinophilic esophagitis       ondansetron 4 MG ODT tab    ZOFRAN-ODT    60 tablet    Take 1-2 tablets (4-8 mg) by mouth every 12 hours as needed for nausea    Migraine without status migrainosus, not intractable, unspecified migraine type       oxyCODONE 5 MG tablet    ROXICODONE    15 tablet    Take 0.5 tablets (2.5 mg) by mouth every 6 hours as needed for severe pain    Postlaminectomy syndrome       PROBIOTIC DAILY PO      Take 2 packets by mouth every morning        propranolol SR BEADS 120 MG 24 hr capsule    INDREAL XL    30 capsule    Take 120 mg by mouth daily    Sinus tachycardia       rizatriptan 5 MG ODT    MAXALT-MLT     TAKE 1-2 TABLETS (5-10 MG) BY MOUTH AT ONSET OF HEADACHE FOR MIGRAINE (MAX 30 MG IN 24 HOURS)        SUMAtriptan 6 MG/0.5ML pen injector kit    IMITREX STATDOSE    2 kit    Inject 0.5 ml (6 mg) subcutaneously at onset of migraine, May repeat in 1 hour , Max 12 mg/24 hrs    Chronic migraine without aura without status migrainosus, not intractable       tiZANidine 4 MG tablet    ZANAFLEX    120 tablet    Take 1 tablet (4 mg) by mouth 4 times daily as needed for muscle spasms    Post laminectomy syndrome       traMADol 50 MG tablet    ULTRAM    60 tablet    Take 1 tablet (50 mg) by mouth every 6 hours as needed for severe pain    Postlaminectomy syndrome       vitamin B complex with vitamin C tablet      Take 1 tablet by mouth daily        VITAMIN D (CHOLECALCIFEROL) PO      Take 2,000 Units by mouth daily        ZONISAMIDE PO      Take 75 mg by mouth daily

## 2018-12-03 NOTE — LETTER
12/3/2018       RE: Ada Welch  [address]  [place] [address]     Dear Colleague,    Thank you for referring your patient, Ada Welch, to the [facility] at [facility] Please see a copy of my visit note below.        Again,Reason for Visit  Ada Welch is a 47 year old female with a h/o Tom Danlos syndrome, eosinophilic esophagitis, nasal polyps, IBS, chronic pain syndrome, and seasonal allergies who is here for evaluation of allergies.  Allergy HPI    The patient presents wondering if there is a unifying diagnosis that is allergic in etiology to a variety of symptoms and lab/imaging findings.  Specifically she wondered about a relationship between her pulmonary nodules, nasal polyps, chronic sinusitis, and reactive lymphadenopathy.      She reports that she has had chronic sinus drainage ever since her septoplasty in 2016.  She has chronic sinus congestion with headache and rhinitis.  No associated cough.  She takes zyrtec daily and flonase daily with limited benefit.  Has been on steroids in the past (overall doesn't tolerate) and did not note much improvement.      She also reports SOB with wheezing several times per week.  Rarely (few times a month) takes her albuterol.  Previously was on Advair but after having normal spirometry was taken off and has it available as a PRN medication.  Of note, she reports being on ICS when spirometry was performed.      She has biopsy proven eosinophilic esophagitis and has been on omeprazole.  She also has a hiatal hernia.  Last bx on 10/22 was negative for Eos.      She does have seasonal allergies that flare in both the spring and fall.  Symptoms include SOB/wheezing, rhinitis, itchy eyes.  She has had allergies test previously in [place] and was allergic to wheat, milk, eggs.  She stopped dairy ~6 months ago.  Reacts to dust.        She moved to [place] ~1.5 years ago from [place].  Lives  in [place].  Previous intermittent light smoker (0.2PPD 10 years).   with 5 children.  Recently moved to a new rental home and symptoms have improved some.     Current Outpatient Prescriptions   Medication     ACETAMINOPHEN PO     albuterol (PROAIR HFA/PROVENTIL HFA/VENTOLIN HFA) 108 (90 BASE) MCG/ACT Inhaler     AMITRIPTYLINE HCL PO     buprenorphine (BUTRANS) 10 MCG/HR WK patch     butalbital-acetaminophen-caffeine (FIORICET/ESGIC) -40 MG per tablet     cetirizine (ZYRTEC) 10 MG tablet     COMPRESSION STOCKINGS     DULoxetine (CYMBALTA) 30 MG EC capsule     eletriptan (RELPAX) 20 MG tablet     EPINEPHrine (EPIPEN/ADRENACLICK/OR ANY BX GENERIC EQUIV) 0.3 MG/0.3ML injection 2-pack     erenumab-aooe (AIMOVIG 140 DOSE) 70 MG/ML injection     famotidine (PEPCID) 20 MG tablet     fluticasone (FLONASE) 50 MCG/ACT spray     fluticasone-salmeterol (ADVAIR DISKUS) 250-50 MCG/DOSE diskus inhaler     levothyroxine (SYNTHROID) 25 MCG tablet     linaclotide (LINZESS) 145 MCG capsule     medical cannabis (Patient's own supply.  Not a prescription)     metoclopramide (REGLAN) 5 MG tablet     Multiple Vitamins-Minerals (MULTIVITAMIN PO)     Nerve Stimulator (CEFALY KIT) KAROLINE     omeprazole (PRILOSEC) 40 MG capsule     ondansetron (ZOFRAN-ODT) 4 MG ODT tab     oxyCODONE IR (ROXICODONE) 5 MG tablet     Probiotic Product (PROBIOTIC DAILY PO)     Propranolol HCl SR Beads 120 MG CP24     rizatriptan (MAXALT-MLT) 5 MG ODT tab     SUMAtriptan (IMITREX STATDOSE) 6 MG/0.5ML pen injector kit     tiZANidine (ZANAFLEX) 4 MG tablet     traMADol (ULTRAM) 50 MG tablet     vitamin B complex with vitamin C (VITAMIN  B COMPLEX) TABS tablet     VITAMIN D, CHOLECALCIFEROL, PO     ZONISAMIDE PO     No current facility-administered medications for this visit.      Allergies   Allergen Reactions     Bee Venom Anaphylaxis, Difficulty breathing, Palpitations, Shortness Of Breath and Visual Disturbance     Patient does carry Epi-Pen     No  Clinical Screening - See Comments Anaphylaxis     Chicken-Derived Products (Egg) Diarrhea and Nausea     Patient will self monitor  Other reaction(s): Nausea  Patient will self monitor  Other reaction(s): GI intolerance     Compazine [Prochlorperazine] Other (See Comments)     Extreme jittery     Food      Milk     Gabapentin      Gluten Meal Other (See Comments)     Pregabalin Other (See Comments)     Topiramate      Wheat Diarrhea     Wheat Bran Nausea     Patient will self monitor  Other reaction(s): Nausea  Patient will self monitor     Social History     Social History     Marital status:      Spouse name: Carry     Number of children: 5     Years of education: N/A     Occupational History     Not on file.     Social History Main Topics     Smoking status: Former Smoker     Packs/day: 0.20     Years: 10.00     Types: Cigarettes     Start date: 1/1/1991     Quit date: 12/1/2013     Smokeless tobacco: Never Used      Comment: I somked on and off during specified dates.     Alcohol use Yes      Comment: occasional     Drug use: Yes     Special: Marijuana      Comment: medical marijuana     Sexual activity: Yes     Partners: Male     Birth control/ protection: Female Surgical     Other Topics Concern     Parent/Sibling W/ Cabg, Mi Or Angioplasty Before 65f 55m? 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     Chronic sinusitis 00/2007    Diagnosed with chronic pansinusitis 2016     Chronic tonsillitis 1980    Had tonsils removed 02/1981, rheumatic fever     Tom-Danlos disease      Eosinophilic esophagitis 08/2018      Gastroesophageal reflux disease 3/1/2017    I have large hiatal hernia     Hernia, abdominal 10/2016    Hiatal Hernia     Hiatal hernia 2016    Have adeline hiatel hernia     Hoarseness Unsure    It comes and goes     IBS (irritable bowel syndrome) with constipation     improved on Linzess     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 Remove screws/rods     BIOPSY  01/01/2008 & 3/2017    mole biopsy, lymph node biopsy     COLONOSCOPY  3/2017    Colonoscopy and GI     ESOPHAGOSCOPY, GASTROSCOPY, DUODENOSCOPY (EGD), COMBINED N/A 8/3/2018    Procedure: COMBINED ESOPHAGOSCOPY, GASTROSCOPY, DUODENOSCOPY (EGD), BIOPSY SINGLE OR MULTIPLE;;  Surgeon: Juan Woodson MD;  Location: [facility]     ESOPHAGOSCOPY, GASTROSCOPY, DUODENOSCOPY (EGD), COMBINED N/A 10/22/2018    Procedure: COMBINED ESOPHAGOSCOPY, GASTROSCOPY, DUODENOSCOPY (EGD), BIOPSY SINGLE OR MULTIPLE;  Surgeon: Sadia Carolina MD;  Location: [facility]     NASAL/SINUS POLYPECTOMY  2017    Polyps were benign     TONSILLECTOMY       TONSILLECTOMY  1981     TUBAL LIGATION       Family History   Problem Relation Age of Onset     Connective Tissue Disorder Mother      eds     Connective Tissue Disorder Sister      eds     Cancer Father      Spinal tumor grew into spinal cord, went in brain     Migraines Father      Diabetes Maternal Grandmother      Elderly diabetes      "Heart Disease Maternal Grandmother      Hypertension Maternal Grandmother      Diabetes Paternal Grandmother      Elderly diabetes     Diabetes Paternal Grandfather      Elderly diabetes     Asthma Sister      Pediatric Asthma     Migraines Sister      Asthma Son      Pediatric Asthma     Thyroid Disease Sister      ,     Migraines Sister      Migraines Sister      Breast Cancer No family hx of          ROS   A complete ROS was otherwise negative except as noted in the HPI and found at the end of the note.  /81  Pulse 64  Ht 1.702 m (5' 7\")  Wt 101.2 kg (223 lb)  LMP 11/18/2018 (Exact Date)  SpO2 98%  BMI 34.93 kg/m2  Exam:   GENERAL APPEARANCE: Well developed, well nourished, alert, and in no apparent distress.  EYES: EOMI, conjunctiva clear non-injected  HENT: Nasal mucosa with no edema and no discharge. No nasal polyps.  Mildly inflamed on the left.    EARS: Canals clear, TMs normal.  MOUTH: Oral mucosa is moist, without any lesions, no tonsillar enlargement, no oropharyngeal exudate.  Cervical LNs are very mildly enlarged bilaterally.   RESP: Good air flow throughout.  No crackles. No rhonchi. No wheezes.  CV: Normal S1, S2, regular rhythm, normal rate. No murmur.  No rub. No gallop. No LE edema.   MS: Extremities normal. No clubbing. No cyanosis.  SKIN: No rashes noted.  NEURO: Speech normal, normal strength and tone, normal gait and stance  PSYCH: Normal mentation, orientation to person, place, and time.  Results:  Ab Eos 7/6/18 - 0.4    CT Head/neck 7/6/18  Impression:  1. No acute intracranial pathology.  2. Bilateral maxillary and ethmoid sinus mucosal thickening with  frothy debris within the left maxillary sinus, suggestive of acute  Sinusitis.    CT chest 4/26/18      IMPRESSION: Stable pulmonary nodules since 1/6/2017. These are  statistically benign.    Assessment and plan:   # Chronic sinusitis  Chronic sinus symptoms are unlikely related to a greater syndromic unifying diagnosis.  " Unlikely to be Churg-Angélica given rather mild respiratory symptoms.  Previous septal surgery with polyps noted in 2016.  Most recent CT head/neck shows sinusitis with debris but does not appear to have polyps.  The presence of chronic sinusitis may explain her reactive lymphadenopathy since she likely has post nasal drip which may lead to inflammation of the cervical/submadibular LNs.  It is unclear if the sinus symptoms are associated with allergies.    # Eosinophil esophagitis  Biopsy proven and follows with GI.  Also has large hiatal hernia.  Improvement of eosinophilic esophagitis after initiation of aggressive PPI suggests that etiology is reflux related and unlikely associated with an allergy.    # Lung nodule  Given stability since at least 2016 are likely benign (old infection/scar)  Recommendations:  - Obtain old allergy testing from [facility], if inadequate may need to repeat testing at future visit   - Obtained and borderline positive for several allergens  - Prednisone and azithromycin to treat sinusitis  - GI to manage Eosinophilic esophagitis  RTC:  4 weeks with SPT (patient to hold antihistamines for week b/f test)  Patient discussed with Dr. Rodriguez who agrees with the plan.  Sam Owens MD, MPH  P[facility] & [facility], [facility]  [phone]        Physician Attestation   I, Vahe Rodriguez, saw this patient with the resident and agree with the resident/fellow's findings and plan of care as documented in the note.  Of note I reviewed her visit with an outside allergy/immunology specialist and they looked at her immune response to vaccines which was appropriate per their note and they did an HIV test and lymphocyte proliferation to mitogen study and it was normal.         Vahe Rodriguez MD  Date of Service (when I saw the patient): Dec 3, 2018        Answers for HPI/ROS submitted by the patient on 12/3/2018   General Symptoms: Yes  Skin Symptoms: Yes  HENT Symptoms: Yes  EYE SYMPTOMS:  Yes  HEART SYMPTOMS: Yes  LUNG SYMPTOMS: Yes  INTESTINAL SYMPTOMS: Yes  URINARY SYMPTOMS: Yes  GYNECOLOGIC SYMPTOMS: No  BREAST SYMPTOMS: No  SKELETAL SYMPTOMS: Yes  BLOOD SYMPTOMS: Yes  NERVOUS SYSTEM SYMPTOMS: Yes  MENTAL HEALTH SYMPTOMS: No  Fever: Yes  Loss of appetite: Yes  Weight loss: No  Weight gain: No  Fatigue: Yes  Night sweats: Yes  Chills: Yes  Increased stress: No  Excessive hunger: Yes  Excessive thirst: Yes  Feeling hot or cold when others believe the temperature is normal: Yes  Loss of height: No  Post-operative complications: Yes  Surgical site pain: Yes  Hallucinations: No  Change in or Loss of Energy: Yes  Hyperactivity: No  Confusion: Yes  Changes in hair: Yes  Changes in moles/birth marks: No  Itching: Yes  Rashes: No  Changes in nails: No  Acne: No  Hair in places you don't want it: No  Change in facial hair: No  Warts: No  Non-healing sores: Yes  Scarring: Yes  Flaking of skin: No  Color changes of hands/feet in cold : Yes  Sun sensitivity: Yes  Skin thickening: No  Ear pain: Yes  Ear discharge: No  Hearing loss: Yes  Tinnitus: Yes  Nosebleeds: No  Congestion: Yes  Sinus pain: Yes  Trouble swallowing: Yes   Voice hoarseness: Yes  Mouth sores: No  Sore throat: Yes  Tooth pain: No  Gum tenderness: No  Bleeding gums: No  Change in taste: Yes  Change in sense of smell: Yes  Dry mouth: Yes  Hearing aid used: No  Neck lump: Yes  Eye pain: No  Vision loss: Yes  Dry eyes: Yes  Watery eyes: No  Eye bulging: No  Double vision: Yes  Flashing of lights: Yes  Spots: Yes  Floaters: Yes  Redness: Yes  Crossed eyes: No  Tunnel Vision: Yes  Yellowing of eyes: No  Eye irritation: Yes  Cough: Yes  Sputum or phlegm: Yes  Coughing up blood: No  Difficulty breating or shortness of breath: Yes  Snoring: Yes  Wheezing: Yes  Difficulty breathing on exertion: Yes  Nighttime Cough: Yes  Difficulty breathing when lying flat: Yes  Chest pain or pressure: Yes  Fast or irregular heartbeat: Yes  Pain in legs with walking:  Yes  Trouble breathing while lying down: Yes  Fingers or toes appear blue: Yes  High blood pressure: No  Low blood pressure: Yes  Fainting: No  Murmurs: No  Pacemaker: No  Varicose veins: No  Edema or swelling: Yes  Wake up at night with shortness of breath: Yes  Light-headedness: Yes  Exercise intolerance: Yes  Heart burn or indigestion: Yes  Nausea: Yes  Vomiting: Yes  Abdominal pain: Yes  Bloating: Yes  Constipation: Yes  Diarrhea: Yes  Blood in stool: No  Black stools: No  Rectal or Anal pain: No  Fecal incontinence: No  Yellowing of skin or eyes: No  Vomit with blood: No  Change in stools: Yes  Trouble holding urine or incontinence: Yes  Pain or burning: Yes  Trouble starting or stopping: Yes  Increased frequency of urination: Yes  Blood in urine: No  Decreased frequency of urination: No  Frequent nighttime urination: Yes  Flank pain: Yes  Difficulty emptying bladder: Yes  Back pain: Yes  Muscle aches: Yes  Neck pain: Yes  Swollen joints: Yes  Joint pain: Yes  Bone pain: Yes  Muscle cramps: Yes  Muscle weakness: Yes  Joint stiffness: Yes  Bone fracture: No  Anemia: No  Swollen glands: Yes  Easy bleeding or bruising: Yes  Trouble with coordination: Yes  Dizziness or trouble with balance: Yes  Fainting or black-out spells: No  Memory loss: Yes  Headache: Yes  Seizures: No  Speech problems: Yes  Tingling: Yes  Tremor: Yes  Weakness: Yes  Difficulty walking: Yes  Paralysis: No  Numbness: Yes   thank you for allowing me to participate in the care of your patient.      Sincerely,    Vahe Álvarez MD

## 2018-12-03 NOTE — PATIENT INSTRUCTIONS
[facility] Medication Refill Request Information:  * Please contact your pharmacy regarding ANY request for medication refills.  ** [facility] Prescription Fax = [phone]  * Please allow 3 business days for routine medication refills.  * Please allow 5 business days for controlled substance medication refills.     [facility] Test Result notification information:  *You will be notified with in 7-10 days of your appointment day regarding the results of your test.  If you are on MyChart you will be notified as soon as the provider has reviewed the results and signed off on them.    [facility]: [phone]       1.  Please have blood drawn today for Rheumatology orders from October 2018

## 2018-12-04 DIAGNOSIS — M96.1 POST LAMINECTOMY SYNDROME: ICD-10-CM

## 2018-12-04 DIAGNOSIS — M96.1 LUMBAR POST-LAMINECTOMY SYNDROME: ICD-10-CM

## 2018-12-04 LAB
ALBUMIN SERPL ELPH-MCNC: 4.1 G/DL (ref 3.7–5.1)
ALPHA1 GLOB SERPL ELPH-MCNC: 0.3 G/DL (ref 0.2–0.4)
ALPHA2 GLOB SERPL ELPH-MCNC: 0.7 G/DL (ref 0.5–0.9)
ANA PAT SER IF-IMP: ABNORMAL
ANA SER QL IF: ABNORMAL
ANA TITR SER IF: ABNORMAL {TITER}
B-GLOBULIN SERPL ELPH-MCNC: 0.8 G/DL (ref 0.6–1)
C3 SERPL-MCNC: 120 MG/DL (ref 76–169)
C4 SERPL-MCNC: 24 MG/DL (ref 15–50)
CCP AB SER IA-ACNC: 1 U/ML
GAMMA GLOB SERPL ELPH-MCNC: 0.8 G/DL (ref 0.7–1.6)
IGA SERPL-MCNC: 83 MG/DL (ref 70–380)
IGG SERPL-MCNC: 830 MG/DL (ref 695–1620)
IGG1 SER-MCNC: 324 MG/DL (ref 300–856)
IGG2 SER-MCNC: 304 MG/DL (ref 158–761)
IGG3 SER-MCNC: 92 MG/DL (ref 24–192)
IGG4 SER-MCNC: 33 MG/DL (ref 11–86)
IGM SERPL-MCNC: 59 MG/DL (ref 60–265)
M PROTEIN SERPL ELPH-MCNC: 0 G/DL
MYELOPEROXIDASE AB SER-ACNC: <0.2 AI (ref 0–0.9)
PROT PATTERN SERPL ELPH-IMP: NORMAL
PROT PATTERN SERPL IFE-IMP: NORMAL
PROTEINASE3 IGG SER-ACNC: <0.2 AI (ref 0–0.9)
RHEUMATOID FACT SER NEPH-ACNC: <20 IU/ML (ref 0–20)

## 2018-12-04 RX ORDER — DULOXETIN HYDROCHLORIDE 30 MG/1
CAPSULE, DELAYED RELEASE ORAL
Qty: 270 CAPSULE | Refills: 1 | Status: SHIPPED | OUTPATIENT
Start: 2018-12-04 | End: 2019-06-28

## 2018-12-10 DIAGNOSIS — M96.1 POSTLAMINECTOMY SYNDROME: ICD-10-CM

## 2018-12-17 ENCOUNTER — APPOINTMENT (OUTPATIENT)
Dept: GENERAL RADIOLOGY | Facility: CLINIC | Age: 48
End: 2018-12-17
Attending: FAMILY MEDICINE
Payer: COMMERCIAL

## 2018-12-17 ENCOUNTER — HOSPITAL ENCOUNTER (EMERGENCY)
Facility: CLINIC | Age: 48
Discharge: HOME | End: 2018-12-17
Attending: FAMILY MEDICINE | Admitting: FAMILY MEDICINE
Payer: COMMERCIAL

## 2018-12-17 VITALS
DIASTOLIC BLOOD PRESSURE: 79 MMHG | SYSTOLIC BLOOD PRESSURE: 118 MMHG | HEIGHT: 67 IN | HEART RATE: 61 BPM | BODY MASS INDEX: 34.06 KG/M2 | OXYGEN SATURATION: 94 % | WEIGHT: 217 LBS | RESPIRATION RATE: 16 BRPM | TEMPERATURE: 97.3 F

## 2018-12-17 DIAGNOSIS — T18.108A ESOPHAGEAL FOREIGN BODY, INITIAL ENCOUNTER: ICD-10-CM

## 2018-12-17 LAB
ALBUMIN SERPL-MCNC: 4.2 G/DL (ref 3.4–5)
ALP SERPL-CCNC: 169 U/L (ref 40–150)
ALT SERPL W P-5'-P-CCNC: 24 U/L (ref 0–50)
ANION GAP SERPL CALCULATED.3IONS-SCNC: 10 MMOL/L (ref 3–14)
APTT PPP: 20 SEC (ref 22–37)
AST SERPL W P-5'-P-CCNC: 21 U/L (ref 0–45)
BASOPHILS # BLD AUTO: 0 10E9/L (ref 0–0.2)
BASOPHILS NFR BLD AUTO: 0 %
BILIRUB SERPL-MCNC: 0.6 MG/DL (ref 0.2–1.3)
BUN SERPL-MCNC: 7 MG/DL (ref 7–30)
CALCIUM SERPL-MCNC: 8.9 MG/DL (ref 8.5–10.1)
CHLORIDE SERPL-SCNC: 107 MMOL/L (ref 94–109)
CO2 SERPL-SCNC: 21 MMOL/L (ref 20–32)
CREAT SERPL-MCNC: 0.71 MG/DL (ref 0.52–1.04)
DIFFERENTIAL METHOD BLD: NORMAL
EOSINOPHIL # BLD AUTO: 0 10E9/L (ref 0–0.7)
EOSINOPHIL NFR BLD AUTO: 0 %
ERYTHROCYTE [DISTWIDTH] IN BLOOD BY AUTOMATED COUNT: 14 % (ref 10–15)
GFR SERPL CREATININE-BSD FRML MDRD: 89 ML/MIN/1.7M2
GLUCOSE SERPL-MCNC: 124 MG/DL (ref 70–99)
HCT VFR BLD AUTO: 43.5 % (ref 35–47)
HGB BLD-MCNC: 14 G/DL (ref 11.7–15.7)
IMM GRANULOCYTES # BLD: 0 10E9/L (ref 0–0.4)
IMM GRANULOCYTES NFR BLD: 0.4 %
INR PPP: 0.9 (ref 0.86–1.14)
INTERPRETATION ECG - MUSE: NORMAL
LIPASE SERPL-CCNC: 86 U/L (ref 73–393)
LYMPHOCYTES # BLD AUTO: 1.9 10E9/L (ref 0.8–5.3)
LYMPHOCYTES NFR BLD AUTO: 17.8 %
MCH RBC QN AUTO: 28 PG (ref 26.5–33)
MCHC RBC AUTO-ENTMCNC: 32.2 G/DL (ref 31.5–36.5)
MCV RBC AUTO: 87 FL (ref 78–100)
MONOCYTES # BLD AUTO: 0.5 10E9/L (ref 0–1.3)
MONOCYTES NFR BLD AUTO: 5.2 %
NEUTROPHILS # BLD AUTO: 8 10E9/L (ref 1.6–8.3)
NEUTROPHILS NFR BLD AUTO: 76.6 %
NRBC # BLD AUTO: 0 10*3/UL
NRBC BLD AUTO-RTO: 0 /100
PLATELET # BLD AUTO: 322 10E9/L (ref 150–450)
POTASSIUM SERPL-SCNC: 4.2 MMOL/L (ref 3.4–5.3)
PROT SERPL-MCNC: 8.3 G/DL (ref 6.8–8.8)
RBC # BLD AUTO: 5 10E12/L (ref 3.8–5.2)
SODIUM SERPL-SCNC: 138 MMOL/L (ref 133–144)
UPPER GI ENDOSCOPY: NORMAL
WBC # BLD AUTO: 10.4 10E9/L (ref 4–11)

## 2018-12-17 PROCEDURE — 99284 EMERGENCY DEPT VISIT MOD MDM: CPT | Mod: 25 | Performed by: FAMILY MEDICINE

## 2018-12-17 PROCEDURE — 96374 THER/PROPH/DIAG INJ IV PUSH: CPT | Mod: 59 | Performed by: FAMILY MEDICINE

## 2018-12-17 PROCEDURE — 96376 TX/PRO/DX INJ SAME DRUG ADON: CPT | Performed by: FAMILY MEDICINE

## 2018-12-17 PROCEDURE — 25000132 ZZH RX MED GY IP 250 OP 250 PS 637: Mod: GY | Performed by: INTERNAL MEDICINE

## 2018-12-17 PROCEDURE — 25000125 ZZHC RX 250: Performed by: INTERNAL MEDICINE

## 2018-12-17 PROCEDURE — C9113 INJ PANTOPRAZOLE SODIUM, VIA: HCPCS | Performed by: FAMILY MEDICINE

## 2018-12-17 PROCEDURE — 80053 COMPREHEN METABOLIC PANEL: CPT | Performed by: FAMILY MEDICINE

## 2018-12-17 PROCEDURE — 93010 ELECTROCARDIOGRAM REPORT: CPT | Mod: Z6 | Performed by: FAMILY MEDICINE

## 2018-12-17 PROCEDURE — 25000128 H RX IP 250 OP 636: Performed by: INTERNAL MEDICINE

## 2018-12-17 PROCEDURE — 25000128 H RX IP 250 OP 636: Performed by: FAMILY MEDICINE

## 2018-12-17 PROCEDURE — 85610 PROTHROMBIN TIME: CPT | Performed by: FAMILY MEDICINE

## 2018-12-17 PROCEDURE — 40000104 ZZH STATISTIC MODERATE SEDATION < 10 MIN: Performed by: INTERNAL MEDICINE

## 2018-12-17 PROCEDURE — 93005 ELECTROCARDIOGRAM TRACING: CPT | Performed by: FAMILY MEDICINE

## 2018-12-17 PROCEDURE — 83690 ASSAY OF LIPASE: CPT | Performed by: FAMILY MEDICINE

## 2018-12-17 PROCEDURE — 43235 EGD DIAGNOSTIC BRUSH WASH: CPT | Performed by: INTERNAL MEDICINE

## 2018-12-17 PROCEDURE — 85025 COMPLETE CBC W/AUTO DIFF WBC: CPT | Performed by: FAMILY MEDICINE

## 2018-12-17 PROCEDURE — 96375 TX/PRO/DX INJ NEW DRUG ADDON: CPT | Mod: 59 | Performed by: FAMILY MEDICINE

## 2018-12-17 PROCEDURE — 96361 HYDRATE IV INFUSION ADD-ON: CPT | Performed by: FAMILY MEDICINE

## 2018-12-17 PROCEDURE — 85730 THROMBOPLASTIN TIME PARTIAL: CPT | Performed by: FAMILY MEDICINE

## 2018-12-17 PROCEDURE — 99285 EMERGENCY DEPT VISIT HI MDM: CPT | Mod: 25 | Performed by: FAMILY MEDICINE

## 2018-12-17 PROCEDURE — 71045 X-RAY EXAM CHEST 1 VIEW: CPT

## 2018-12-17 RX ORDER — LORAZEPAM 2 MG/ML
0.5 INJECTION INTRAMUSCULAR ONCE
Status: COMPLETED | OUTPATIENT
Start: 2018-12-17 | End: 2018-12-17

## 2018-12-17 RX ORDER — SIMETHICONE
LIQUID (ML) MISCELLANEOUS PRN
Status: DISCONTINUED | OUTPATIENT
Start: 2018-12-17 | End: 2018-12-17 | Stop reason: HOSPADM

## 2018-12-17 RX ORDER — SODIUM CHLORIDE 9 MG/ML
1000 INJECTION, SOLUTION INTRAVENOUS CONTINUOUS
Status: DISCONTINUED | OUTPATIENT
Start: 2018-12-17 | End: 2018-12-17 | Stop reason: HOSPADM

## 2018-12-17 RX ORDER — LIDOCAINE 40 MG/G
CREAM TOPICAL
Status: DISCONTINUED | OUTPATIENT
Start: 2018-12-17 | End: 2018-12-17 | Stop reason: HOSPADM

## 2018-12-17 RX ORDER — FENTANYL CITRATE 50 UG/ML
INJECTION, SOLUTION INTRAMUSCULAR; INTRAVENOUS PRN
Status: DISCONTINUED | OUTPATIENT
Start: 2018-12-17 | End: 2018-12-17 | Stop reason: HOSPADM

## 2018-12-17 RX ORDER — ONDANSETRON 2 MG/ML
4 INJECTION INTRAMUSCULAR; INTRAVENOUS EVERY 30 MIN PRN
Status: DISCONTINUED | OUTPATIENT
Start: 2018-12-17 | End: 2018-12-17 | Stop reason: HOSPADM

## 2018-12-17 RX ADMIN — LORAZEPAM 0.5 MG: 2 INJECTION, SOLUTION INTRAMUSCULAR; INTRAVENOUS at 10:28

## 2018-12-17 RX ADMIN — PANTOPRAZOLE SODIUM 40 MG: 40 INJECTION, POWDER, FOR SOLUTION INTRAVENOUS at 09:30

## 2018-12-17 RX ADMIN — ONDANSETRON 4 MG: 2 INJECTION INTRAMUSCULAR; INTRAVENOUS at 12:40

## 2018-12-17 RX ADMIN — SODIUM CHLORIDE 1000 ML: 9 INJECTION, SOLUTION INTRAVENOUS at 09:29

## 2018-12-17 RX ADMIN — ONDANSETRON 4 MG: 2 INJECTION INTRAMUSCULAR; INTRAVENOUS at 09:30

## 2018-12-17 RX ADMIN — GLUCAGON HYDROCHLORIDE 1 MG: 1 INJECTION, POWDER, FOR SOLUTION INTRAMUSCULAR; INTRAVENOUS; SUBCUTANEOUS at 09:53

## 2018-12-17 ASSESSMENT — ENCOUNTER SYMPTOMS
HEADACHES: 1
APPETITE CHANGE: 1
VOMITING: 1
FEVER: 0
WEAKNESS: 1
FATIGUE: 1
DECREASED CONCENTRATION: 1
DYSPHORIC MOOD: 1
BRUISES/BLEEDS EASILY: 0
VOICE CHANGE: 0
SHORTNESS OF BREATH: 0
BACK PAIN: 0
TROUBLE SWALLOWING: 1
NERVOUS/ANXIOUS: 1
DYSURIA: 0
NAUSEA: 1

## 2018-12-17 ASSESSMENT — MIFFLIN-ST. JEOR: SCORE: 1651.94

## 2018-12-17 NOTE — ED PROVIDER NOTES
History     Chief Complaint   Patient presents with     Nausea & Vomiting     HPI  Ada Welch is a 48 year old female with a history of Tom Danlos syndrome, eosinophilic esophagitis, nasal polyps, IBS, chronic pain syndrome who presents to the Emergency Department with complaints of nausea and vomiting. She states she ate steak for supper about 14 hours ago. She has since been unable to swallow or keep fluids down. She states it feels like the piece of steak is stuck and causing her nausea. She notes she was feeling okay prior, and states she chewed the steak over 30x. She has had problems with steak in the past. The patient notes she was on a soft food diet for a couple month and has recently started introducing regular foods again. She also reports a headache.  She notes she has been unable to take omeprazole today, she also states she has a hiatal hernia.     On 10/22/18 she had an upper GI endoscopy,  Findings:        A 6 cm hiatal hernia was present.        The Z-line was regular and was found 31 cm from the incisors.        Diaphragmatic pinch is located at 37 cm. Most of the fundus is above the        diaphragm.        The lower third of the esophagus was significantly tortuous. This may be        the cause of dysphagia.        GE junction is widely patent. There was no indication for dilation.        Biopsies were obtained from the proximal and distal esophagus with cold        forceps to rule out eosinophilic esophagitis.        The entire examined stomach was otherwise normal.        The duodenal bulb, first portion of the duodenum and second portion of        the duodenum were normal.    I have reviewed the Medications, Allergies, Past Medical and Surgical History, and Social History in the [employer] system.  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     Chronic sinusitis 00/2007    Diagnosed with chronic pansinusitis 2016     Chronic tonsillitis 1980    Had tonsils removed 02/1981, rheumatic fever     Tom-Danlos disease      Eosinophilic esophagitis 08/2018     Gastroesophageal reflux disease 3/1/2017    I have large hiatal hernia     Hernia, abdominal 10/2016    Hiatal Hernia     Hiatal hernia 2016    Have adeline hiatel hernia     Hoarseness Unsure    It comes and goes     IBS (irritable bowel syndrome) with constipation     improved on Linzess     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 Remove screws/rods     BIOPSY  01/01/2008 & 3/2017    mole biopsy, lymph node biopsy     COLONOSCOPY  3/2017    Colonoscopy and GI     ESOPHAGOSCOPY, GASTROSCOPY, DUODENOSCOPY (EGD), COMBINED N/A 8/3/2018    Procedure: COMBINED ESOPHAGOSCOPY, GASTROSCOPY, DUODENOSCOPY (EGD), BIOPSY SINGLE OR MULTIPLE;;  Surgeon: Juan Woodson MD;  Location:  GI     ESOPHAGOSCOPY, GASTROSCOPY, DUODENOSCOPY (EGD), COMBINED N/A 10/22/2018    Procedure: COMBINED ESOPHAGOSCOPY, GASTROSCOPY, DUODENOSCOPY (EGD), BIOPSY SINGLE OR MULTIPLE;  Surgeon: Sadia [place] MD;  Location:  GI     NASAL/SINUS POLYPECTOMY  2017    Polyps were benign     TONSILLECTOMY        TONSILLECTOMY  1981     TUBAL LIGATION         Family History   Problem Relation Age of Onset     Connective Tissue Disorder Mother         eds     Connective Tissue Disorder Sister         eds     Cancer Father         Spinal tumor grew into spinal cord, went in brain     Migraines Father      Diabetes Maternal Grandmother         Elderly diabetes     Heart Disease Maternal Grandmother      Hypertension Maternal Grandmother      Diabetes Paternal Grandmother         Elderly diabetes     Diabetes Paternal Grandfather         Elderly diabetes     Asthma Sister         Pediatric Asthma     Migraines Sister      Asthma Son         Pediatric Asthma     Thyroid Disease Sister         ,     Migraines Sister      Migraines Sister      Breast Cancer No family hx of        Social History     Tobacco Use     Smoking status: Former Smoker     Packs/day: 0.20     Years: 10.00     Pack years: 2.00     Types: Cigarettes     Start date: 1991     Last attempt to quit: 2013     Years since quittin.0     Smokeless tobacco: Never Used     Tobacco comment: I somked on and off during specified dates.   Substance Use Topics     Alcohol use: Yes     Comment: occasional       No current facility-administered medications for this encounter.      Current Outpatient Medications   Medication     ACETAMINOPHEN PO     albuterol (PROAIR HFA/PROVENTIL HFA/VENTOLIN HFA) 108 (90 BASE) MCG/ACT Inhaler     AMITRIPTYLINE HCL PO     azithromycin (ZITHROMAX) 250 MG tablet     buprenorphine (BUTRANS) 10 MCG/HR WK patch     butalbital-acetaminophen-caffeine (FIORICET/ESGIC) -40 MG per tablet     cetirizine (ZYRTEC) 10 MG tablet     COMPRESSION STOCKINGS     DULoxetine (CYMBALTA) 30 MG capsule     eletriptan (RELPAX) 20 MG tablet     EPINEPHrine (EPIPEN/ADRENACLICK/OR ANY BX GENERIC EQUIV) 0.3 MG/0.3ML injection 2-pack     erenumab-aooe (AIMOVIG 140 DOSE) 70 MG/ML injection     famotidine (PEPCID) 20 MG tablet     fluticasone (FLONASE) 50  MCG/ACT spray     fluticasone-salmeterol (ADVAIR DISKUS) 250-50 MCG/DOSE diskus inhaler     levothyroxine (SYNTHROID) 25 MCG tablet     linaclotide (LINZESS) 145 MCG capsule     medical cannabis (Patient's own supply.  Not a prescription)     metoclopramide (REGLAN) 5 MG tablet     Multiple Vitamins-Minerals (MULTIVITAMIN PO)     Nerve Stimulator (CEFALY KIT) KAROLINE     omeprazole (PRILOSEC) 40 MG capsule     ondansetron (ZOFRAN-ODT) 4 MG ODT tab     oxyCODONE IR (ROXICODONE) 5 MG tablet     predniSONE (DELTASONE) 20 MG tablet     Probiotic Product (PROBIOTIC DAILY PO)     Propranolol HCl SR Beads 120 MG CP24     rizatriptan (MAXALT-MLT) 5 MG ODT tab     SUMAtriptan (IMITREX STATDOSE) 6 MG/0.5ML pen injector kit     tiZANidine (ZANAFLEX) 4 MG tablet     traMADol (ULTRAM) 50 MG tablet     vitamin B complex with vitamin C (VITAMIN  B COMPLEX) TABS tablet     VITAMIN D, CHOLECALCIFEROL, PO     ZONISAMIDE PO        Allergies   Allergen Reactions     Bee Venom Anaphylaxis, Difficulty breathing, Palpitations, Shortness Of Breath and Visual Disturbance     Patient does carry Epi-Pen     No Clinical Screening - See Comments Anaphylaxis     Chicken-Derived Products (Egg) Diarrhea and Nausea     Patient will self monitor  Other reaction(s): Nausea  Patient will self monitor  Other reaction(s): GI intolerance     Compazine [Prochlorperazine] Other (See Comments)     Extreme jittery     Food      Milk     Gabapentin      Gluten Meal Other (See Comments)     Pregabalin Other (See Comments)     Topiramate      Wheat Diarrhea     Wheat Bran Nausea     Patient will self monitor  Other reaction(s): Nausea  Patient will self monitor       Review of Systems   Constitutional: Positive for appetite change and fatigue. Negative for fever.   HENT: Positive for trouble swallowing. Negative for voice change.    Respiratory: Negative for shortness of breath.    Cardiovascular: Negative for chest pain and leg swelling.   Gastrointestinal:  "Positive for nausea and vomiting.   Genitourinary: Negative for dysuria.   Musculoskeletal: Negative for back pain.   Skin: Negative for rash.   Allergic/Immunologic: Negative for immunocompromised state.   Neurological: Positive for weakness (general) and headaches.   Hematological: Does not bruise/bleed easily.   Psychiatric/Behavioral: Positive for decreased concentration and dysphoric mood. The patient is nervous/anxious.    All other systems reviewed and are negative.      Physical Exam   BP: (!) 149/93  Pulse: 79  Heart Rate: 93  Temp: 97.3  F (36.3  C)  Resp: 14  Height: 170.2 cm (5' 7\")  Weight: 98.4 kg (217 lb)  SpO2: 97 %      Physical Exam   Constitutional: She is oriented to person, place, and time. She appears well-developed and well-nourished. She appears distressed.   General patient is uncomfortable continues to rash unable to even handle secretions causing some nausea vomiting airway intact  present   HENT:   Head: Normocephalic and atraumatic.   Eyes: EOM are normal. Pupils are equal, round, and reactive to light. No scleral icterus.   Neck: Normal range of motion. Neck supple. No JVD present.   Cardiovascular: Normal rate and regular rhythm.   Pulmonary/Chest: No stridor. No respiratory distress.   Abdominal: She exhibits no distension. There is no tenderness.   Musculoskeletal: She exhibits no edema or deformity.   Neurological: She is alert and oriented to person, place, and time.   Nonfocal   Skin: Skin is warm and dry. Capillary refill takes less than 2 seconds. No rash noted. She is not diaphoretic. No erythema. No pallor.   Psychiatric:   Flat affect and comfort because of ongoing retching but otherwise appropriate.   Nursing note and vitals reviewed.      ED Course        Procedures        Patient was evaluated here.  Chest x-ray done revealing no obvious infiltrate EKG did not show ischemic changes.  IV established fluids given.  Discussed with GI also.  We did give a dose of " glucagon 1 mg IV without change.  Patient given Zofran also and Protonix IV.  Patient still very uncomfortable discussed with GI patient then taken to endoscopy suite where GI performed upper endoscopy for further evaluation.    Findings reported later without meat bolus foreign body lower esophagus which was pushed through.  No other consultation noted patient was reevaluated in the ER seemed appropriate now after conscious sedation.  Evaluated.    Patient otherwise stable here is alert and oriented appropriate has been comfortable taking him home we will continue medications will go from clear liquid to soft diet.         EKG Interpretation:      Interpreted by Bonilla Montoya  Time reviewed:938   Symptoms at time of EKG: nausea and vomiting   Rhythm: normal sinus   Rate: normal  Axis: NORMAL  Ectopy: none  Conduction: normal  ST Segments/ T Waves: No ST-T wave changes  Q Waves: none  Comparison to prior: No old EKG available    Clinical Impression: normal EKG      Critical Care time:  none             Labs Ordered and Resulted from Time of ED Arrival Up to the Time of Departure from the ED   PARTIAL THROMBOPLASTIN TIME - Abnormal; Notable for the following components:       Result Value    PTT 20 (*)     All other components within normal limits   COMPREHENSIVE METABOLIC PANEL - Abnormal; Notable for the following components:    Glucose 124 (*)     Alkaline Phosphatase 169 (*)     All other components within normal limits   CBC WITH PLATELETS DIFFERENTIAL   INR   LIPASE   PULSE OXIMETRY NURSING   PERIPHERAL IV CATHETER     Results for orders placed or performed during the hospital encounter of 12/17/18   UPPER GI ENDOSCOPY   Result Value Ref Range    Upper GI Endoscopy       [address] [place] [address] [phone]     Endoscopy Department  _______________________________________________________________________________  Patient Name: Ada Welch          Procedure Date:  12/17/2018 11:26 AM  MRN: [id number]                       Account Number: [id number]  YOB: 1970             Admit Type: Emergency Department  Age: 48                                Gender: Female  Note Status: Finalized                Attending MD: Juan Woodson MD  Pause for the Cause: Pause for cause completed. Total Sedation Time:   _______________________________________________________________________________     Procedure:           Upper GI endoscopy  Indications:         Foreign body in the esophagus  Providers:           Juan Woodson MD, Jeri Fajardo RN  Referring MD:        Juana Griffith MD  Medicines:           Fentanyl 75 micrograms IV, Midazolam 3 mg IV  Complications:       No immediate complications.  ____________ ___________________________________________________________________  Procedure:           Pre-Anesthesia Assessment:                       - Prior to the procedure, a History and Physical was                        performed, and patient medications and allergies were                        reviewed. The patient is competent. The risks and                        benefits of the procedure and the sedation options and                        risks were discussed with the patient. All questions                        were answered and informed consent was obtained. Patient                        identification and proposed procedure were verified by                        the physician and the nurse in the pre-procedure area.                        Mental Status Examination: alert and oriented. Airway                        Examination: normal oropharyngeal airway and neck                        mobility. Respiratory Examination: clear to                        auscultation. CV Examination : normal. Prophylactic                        Antibiotics: The patient does not require prophylactic                        antibiotics. Prior Anticoagulants: The patient has taken                         no previous anticoagulant or antiplatelet agents. ASA                        Grade Assessment: III - A patient with severe systemic                        disease. After reviewing the risks and benefits, the                        patient was deemed in satisfactory condition to undergo                        the procedure. The anesthesia plan was to use moderate                        sedation / analgesia (conscious sedation). Immediately                        prior to administration of medications, the patient was                        re-assessed for adequacy to receive sedatives. The heart                        rate, respiratory rate, oxygen saturations, blood                        pressure, adequacy of pulmonary ventilation, and                        response to care were cecilia tored throughout the                        procedure. The physical status of the patient was                        re-assessed after the procedure.                       After obtaining informed consent, the endoscope was                        passed under direct vision. Throughout the procedure,                        the patient's blood pressure, pulse, and oxygen                        saturations were monitored continuously. The Endoscope                        was introduced through the mouth, and advanced to the                        second part of duodenum. The upper GI endoscopy was                        accomplished without difficulty. The patient tolerated                        the procedure well.                                                                                   Findings:       Impacted food bolus was found in the lower third of the esophagus.        Gently pushed in to the stomach with CO2 insufflation.       A non-obstructing ring was found at the zoran roesophageal junction which        is resposible for impaction. However, the ring was >12 mm in diameter        and met with no  resistance with passage of adult EGD scope. No dilation        attempts were made in a setting of inflammation associated with food        stasis.       Mucosal changes including ringed esophagus, longitudinal furrows and        congestion (edema) were found in the entire esophagus. This is        consistent with known diagnosis of esophageal eosinophilia.       A large 5 cm hiatal hernia was present. No erosions or ulcers seen.       The gastric body, incisura, gastric antrum and pylorus were normal.       The duodenal bulb and second portion of the duodenum were normal.                                                                                   Moderate Sedation:       Moderate (conscious) sedation was administered by the endoscopy nurse        and supervised by the endoscopist. The following parameters were        monitored: oxygen saturation, heart r ate, blood pressure, and response        to care. Total physician intraservice time was 7 minutes.  Impression:          - Impacted food bolus in the lower third of the                        esophagus. Dislodged in to the stomach with CO2                        insufflation.                       - Non-obstructing ring at the GEJ. This ring was >12 mm                        in diameter and met with no resistance with passage of                        adult EGD scope.                       - Esophageal mucosal changes consistent with esophageal                        eosinophilia as above.                       - A 5 cm hiatal hernia. No erosions or ulcers seen.                       - Normal gastric body, incisura, antrum and pylorus.                       - Normal duodenal bulb and second portion of the                        duodenum.                       - No specimens collected.  Recommendation:      - Return patient to ED for possible discharge same day.                       -  Advance diet as tolerated today to full liquids only.                         Recommended advancing ONLY to mechanical soft diet.                       - Continue present medications including PPI twice daily.                       - Patient to schedule follwo up with Dr Griffith in the                        next few weeks.                       - Findings and recommendations were discussed with the                        patient soon after the procedure.                                                                                     Electronically signed by: Juan Woodson  ________________  Juan Woodson MD  12/17/2018 1:19:32 PM  I was physically present for the entire viewing portion of the exam.  __________________________  Signature of teaching physician  Dunia/Aisha Woodson MD  Number of Addenda: 0    Note Initiated On: 12/17/2018 11:26 AM  Scope In:  Scope Out:     XR Chest Port 1 View    Narrative    EXAMINATION: XR CHEST PORT 1 VW on 12/17/2018 9:54 AM.    INDICATION: Nausea, vomiting and chest pain. History of Tom-Danlos  syndrome and eosinophilic esophagitis.    COMPARISON: Chest x-ray dated 10/25/2018. CT chest dated 4/26/2018.    FINDINGS: AP 70 degrees upright view of the chest.     Trachea is midline. Cardiomediastinal silhouette is within normal  limits. The pulmonary vasculature is within normal limits. No focal  airspace opacification. No appreciable pleural effusions or  pneumothorax. Unremarkable appearance of soft tissues and bones.      Impression    IMPRESSION:  1. No acute cardiac or pulmonary pathology.    I have personally reviewed the examination and initial interpretation  and I agree with the findings.    ALDO SORENSON MD   CBC with platelets differential   Result Value Ref Range    WBC 10.4 4.0 - 11.0 10e9/L    RBC Count 5.00 3.8 - 5.2 10e12/L    Hemoglobin 14.0 11.7 - 15.7 g/dL    Hematocrit 43.5 35.0 - 47.0 %    MCV 87 78 - 100 fl    MCH 28.0 26.5 - 33.0 pg    MCHC 32.2 31.5 - 36.5 g/dL    RDW 14.0 10.0 - 15.0 %    Platelet Count 322 150 - 450 10e9/L     Diff Method Automated Method     % Neutrophils 76.6 %    % Lymphocytes 17.8 %    % Monocytes 5.2 %    % Eosinophils 0.0 %    % Basophils 0.0 %    % Immature Granulocytes 0.4 %    Nucleated RBCs 0 0 /100    Absolute Neutrophil 8.0 1.6 - 8.3 10e9/L    Absolute Lymphocytes 1.9 0.8 - 5.3 10e9/L    Absolute Monocytes 0.5 0.0 - 1.3 10e9/L    Absolute Eosinophils 0.0 0.0 - 0.7 10e9/L    Absolute Basophils 0.0 0.0 - 0.2 10e9/L    Abs Immature Granulocytes 0.0 0 - 0.4 10e9/L    Absolute Nucleated RBC 0.0    INR   Result Value Ref Range    INR 0.90 0.86 - 1.14   Partial thromboplastin time   Result Value Ref Range    PTT 20 (L) 22 - 37 sec   Comprehensive metabolic panel   Result Value Ref Range    Sodium 138 133 - 144 mmol/L    Potassium 4.2 3.4 - 5.3 mmol/L    Chloride 107 94 - 109 mmol/L    Carbon Dioxide 21 20 - 32 mmol/L    Anion Gap 10 3 - 14 mmol/L    Glucose 124 (H) 70 - 99 mg/dL    Urea Nitrogen 7 7 - 30 mg/dL    Creatinine 0.71 0.52 - 1.04 mg/dL    GFR Estimate 89 >60 mL/min/1.7m2    GFR Estimate If Black >90 >60 mL/min/1.7m2    Calcium 8.9 8.5 - 10.1 mg/dL    Bilirubin Total 0.6 0.2 - 1.3 mg/dL    Albumin 4.2 3.4 - 5.0 g/dL    Protein Total 8.3 6.8 - 8.8 g/dL    Alkaline Phosphatase 169 (H) 40 - 150 U/L    ALT 24 0 - 50 U/L    AST 21 0 - 45 U/L   Lipase   Result Value Ref Range    Lipase 86 73 - 393 U/L   EKG 12-lead, tracing only   Result Value Ref Range    Interpretation ECG Click View Image link to view waveform and result        Consults  GI: Responded (12/17/18 5862)    Assessments & Plan (with Medical Decision Making)  41-year-old female history of hiatal hernia said some problems with dysphasia.  Patient presented after eating meat last evening.  Is 12 hours ongoing difficulty handling secretions but no airway issues.  Patient with nausea vomiting retching.  Cardiac workup otherwise negative.  Patient here in the ER evaluated chest x-ray EKG etc.  Given IV fluids no change with Reglan given Protonix and  Zofran IV patient seen by GI procedure as he did pass a meat bolus that was causing her symptoms via endoscopy no other complications noted patient further evaluated then in the ER after sent from endoscopy suite.  Patient is alert oriented appropriate discharge with  for follow-up with GI as noted.         I have reviewed the nursing notes.    I have reviewed the findings, diagnosis, plan and need for follow up with the patient.       Medication List      There are no discharge medications for this visit.         Final diagnoses:   Esophageal foreign body, initial encounter     IOlinda, am serving as a trained medical scribe to document services personally performed by Bonilla Montoya MD, based on the provider's statements to me.   IBonilla MD, was physically present and have reviewed and verified the accuracy of this note documented by Olinda Champion.    12/17/2018   [facility], [place], EMERGENCY DEPARTMENT     Bonilla Montoya MD  12/17/18 [id number]

## 2018-12-17 NOTE — DISCHARGE INSTRUCTIONS
Home with .  Your labs look good.  GI passed the steak through.  Continue omeprazole as directed and clear to soft diet.  Follow up with GI as planned.  Return if any concerns.

## 2018-12-17 NOTE — ED AVS SNAPSHOT
[facility], [place], Emergency Department  [address] [place] [id number]  Phone:  [phone]                                    Ada Welch   MRN: [id number]    Department:  [facility], Emergency Department   Date of Visit:  12/17/2018           After Visit Summary Signature Page    I have received my discharge instructions, and my questions have been answered. I have discussed any challenges I see with this plan with the nurse or doctor.    ..........................................................................................................................................  Patient/Patient Representative Signature      ..........................................................................................................................................  Patient Representative Print Name and Relationship to Patient    ..................................................               ................................................  Date                                   Time    ..........................................................................................................................................  Reviewed by Signature/Title    ...................................................              ..............................................  Date                                               Time          22EPIC Rev 08/18

## 2018-12-17 NOTE — ED TRIAGE NOTES
Last night had steak and since then has sensation that something is in throat. + N/V. Also, is experiencing a headache.

## 2019-01-03 ENCOUNTER — THERAPY VISIT (OUTPATIENT)
Dept: PHYSICAL THERAPY | Facility: CLINIC | Age: 49
End: 2019-01-03
Payer: COMMERCIAL

## 2019-01-03 DIAGNOSIS — M99.05 PELVIC SOMATIC DYSFUNCTION: ICD-10-CM

## 2019-01-03 DIAGNOSIS — R32 URINARY INCONTINENCE: ICD-10-CM

## 2019-01-03 PROBLEM — K59.00 CONSTIPATION: Status: ACTIVE | Noted: 2017-08-30

## 2019-01-03 PROCEDURE — 97112 NEUROMUSCULAR REEDUCATION: CPT | Mod: GP | Performed by: PHYSICAL THERAPIST

## 2019-01-03 PROCEDURE — 97161 PT EVAL LOW COMPLEX 20 MIN: CPT | Mod: GP | Performed by: PHYSICAL THERAPIST

## 2019-01-03 PROCEDURE — 97530 THERAPEUTIC ACTIVITIES: CPT | Mod: GP | Performed by: PHYSICAL THERAPIST

## 2019-01-03 PROCEDURE — 97140 MANUAL THERAPY 1/> REGIONS: CPT | Mod: GP | Performed by: PHYSICAL THERAPIST

## 2019-01-03 PROCEDURE — G8988 SELF CARE GOAL STATUS: HCPCS | Mod: GP | Performed by: PHYSICAL THERAPIST

## 2019-01-03 PROCEDURE — G8987 SELF CARE CURRENT STATUS: HCPCS | Mod: GP | Performed by: PHYSICAL THERAPIST

## 2019-01-03 NOTE — PROGRESS NOTES
"[facility] Initial Evaluation  Subjective:  HPI  SUBJECTIVE:  Patient reports onset of symptoms of urinary incontinence and constipation issues ongoing x 5 years.  Pt has Tom Danlos Syndrome, dysautonomia, and has colitis as well as surgery for Lumbar L4-S1 fusion in 2013.  Pt was in a wheelchair from 9402-5353, started walking more last 2 years.  She complains of urinary urge incontinence, sometimes leaks when she is standing up and with stress/cough/sneeze.  She also complains of constipation and takes Linzess to help her bowels.  There are times that she feels she is constipated but will sit down and when she stands up, notices that she did have a BM even though she did not feel herself eliminate.  Pt recently started soft food diet due to about 2 weeks ago had \"choking incident\" and was in ER.  Was told she had hiatal hernia.  Since onset symptoms have been getting better, worse or staying the same? Same to worse.  Ref[facility] on 11/16/2018.  Urination:  Do you leak on the way to the bathroom or with a strong urge to void? Yes    Do you leak with cough,sneeze, jumping, running?Yes   Any other activities that cause leaking? Standing up  Do you have triggers that make you feel you can't wait to go to the bathroom? Yes   what are they: dysautonomia flare.  Type of pad and number used per day? 1-2 medium absorbancy  When you leak what is the amount? small    How long can you delay the need to urinate? Not at all.   How many times do you get up to urinate at night? 4-5  Can you stop the flow of urine when on the toilet? Yes  Is the volume of urine passed usually: small. (8sec rule=  250ml with average bladder storing  400-600ml)    Do you strain to pass urine? Yes  Do you have a slow or hesitant urinary stream? Yes  Do you have difficulty initiating the urine stream? Yes    How many bladder infections have you had in last 12 months?none    Fluid intake(one glass is " 8oz or one cup) 100 oz glasses/day, 36 oz (3 cans of soda) caffinated glasses/day  0 alcohol glasses/day.    Bowel habits:  Frequency of bowel movements?4-5 times a week  Consistancy of stool? soft, soft formed, hard, [place] Stool Scale ranges from 1-7  Do you ignore the urge to defecate? No  Do you strain to pass stool? Yes    Pelvic Pain:  When do you have pelvic pain? Regularly feels like stomach cramping  Is initial penetration during intercourse painful? Yes  Is deeper penetration painful? Yes  Do you use lubricant? yes What kind? Coconut oil      Given birth? Yes Any complications?no, # of vaginal delieveries?5, # of episiotomies? 4.  Are you sexually active?Yes  Have you ever been worried for your physical safety? No  Any abdominal or pelvic surgeries? Yes- tubal ligation  Are you having any regular exercise? No - exercise intolerance  Have you practiced the PF(kegel) exercises for 4 or more weeks?yes    Marinoff Scale:Level 2  (Level 3: Abstinence from intercourse because of severe pain. Level 2: Painful intercourse which limites frequency of activity. Level 1: Painful intercourse not severe enough to prevent activity.)                                    Objective:  System                                 Pelvic Dysfunction Evaluation:        Flexibility:    Tightness present at:Adductors; Piriformis and Gluteals    Abdominal Wall:  NA        Pelvic Clock Exam:          Levator ANI:  ++        External Assessment:    Skin Condition:  Normal  Scars:  Well healed  Bearing Down/Coughing:  Rectocele  Tissue Symmetry:  Normal  Introitus:  Normal  Muscle Contraction/Perineal Mobility:  Slight lift, no urogential triangle descent  Internal Assessment:      Contraction/Grade:  Fair squeeze, definite lift (3)          SEMG Biofeedback:    Equipment:  MR20    [facility]--Perianal:  Yes  Baseline EMG PM:  0.7 uV    Peak pelvic muscle contraction:  Endurance hold 15.7 uV, fast twitch holds 10.2 uV.   Did have PFM increased activity when asked to push like she was having BM, showing poor coordination of muscle for relaxation.    EMG interpretation to fatigue:  5-8 seconds                         General     ROS    Assessment/Plan:    Patient is a 48 year old female with pelvic complaints.    Patient has the following significant findings with corresponding treatment plan.                Diagnosis 1:  Pelvic floor dysfunction  Pain -  manual therapy, self management, education and home program  Decreased ROM/flexibility - manual therapy, therapeutic exercise, therapeutic activity and home program  Decreased strength - therapeutic exercise, therapeutic activities and home program  Impaired muscle performance - biofeedback, neuro re-education and home program  Decreased function - therapeutic activities and home program    Therapy Evaluation Codes:   1) History comprised of:   Personal factors that impact the plan of care:      Past/current experiences and Time since onset of symptoms.    Comorbidity factors that impact the plan of care are:      Bowel/bladder changes, Concussion, Migraines/headaches, Numbness/tingling, Overweight, Weakness and Tom Danlos Syndrome, Dysautonomia, mast cell.     Medications impacting care: Anti-inflammatory and reflux, constipation meds, migranes.  2) Examination of Body Systems comprised of:   Body structures and functions that impact the plan of care:      Pelvis.   Activity limitations that impact the plan of care are:      [place], Stress incontinence, Urinary incontinence and constipation.  3) Clinical presentation characteristics are:   Stable/Uncomplicated.  4) Decision-Making    Moderate complexity using standardized patient assessment instrument and/or measureable assessment of functional outcome.  Cumulative Therapy Evaluation is: Moderate complexity.    Previous and current functional limitations:  (See Goal Flow Sheet for this information)    Short term and Long  term goals: (See Goal Flow Sheet for this information)     Communication ability:  Patient appears to be able to clearly communicate and understand verbal and written communication and follow directions correctly.  Treatment Explanation - The following has been discussed with the patient:   RX ordered/plan of care  Anticipated outcomes  Possible risks and side effects  This patient would benefit from PT intervention to resume normal activities.   Rehab potential is good.    Frequency:  1 X week, once daily  Duration:  for 6 weeks tapering to 2 X a month over 2 months   Discharge Plan:  Achieve all LTG.  Independent in home treatment program.  Reach maximal therapeutic benefit.    Please refer to the daily flowsheet for treatment today, total treatment time and time spent performing 1:1 timed codes.

## 2019-01-03 NOTE — LETTER
"DEPARTMENT OF HEALTH AND HUMAN SERVICES  CENTERS FOR MEDICARE & MEDICAID SERVICES    PLAN/UPDATED PLAN OF PROGRESS FOR OUTPATIENT REHABILITATION    PATIENTS NAME:  Ada Welch   : 1970    PROVIDER NUMBER:    [id number]    HICN:  [id number]     PROVIDER NAME: [facility] PHYSICAL [facility]ERAP[facility]    MEDICAL RECORD NUMBER: [id number]     START OF CARE DATE:  SOC Date: 19   TYPE:  PT    PRIMARY/TREATMENT DIAGNOSIS: (Pertinent Medical Diagnosis)     Pelvic somatic dysfunction  Urinary incontinence    VISITS FROM START OF CARE:  Rxs Used: 1     [place] [facility] Initial Evaluation  Subjective:  HPI  SUBJECTIVE:  Patient reports onset of symptoms of urinary incontinence and constipation issues ongoing x 5 years.  Pt has Tom Danlos Syndrome, dysautonomia, and has colitis as well as surgery for Lumbar L4-S1 fusion in .  Pt was in a wheelchair from 4498-2139, started walking more last 2 years.  She complains of urinary urge incontinence, sometimes leaks when she is standing up and with stress/cough/sneeze.  She also complains of constipation and takes Linzess to help her bowels.  There are times that she feels she is constipated but will sit down and when she stands up, notices that she did have a BM even though she did not feel herself eliminate.  Pt recently started soft food diet due to about 2 weeks ago had \"choking incident\" and was in ER.  Was told she had hiatal hernia.  Since onset symptoms have been getting better, worse or staying the same? Same to worse.  [facility] on 2018.  Pertinent medical history includes:  Concussions/dizziness, incontinence,   migraines/headaches, overweight and thyroid problems (Numbness/tingling, changes in bowel/bladder, cold/hot extremity, non-healing wounds numbness in perianal region, pain at night/.rest, persistent fever/chills, progressive neurological deficits, severe headaches, significant " weakness, dysauticinomia, colette).  Medical allergies: no.  Other surgeries include:  Orthopedic surgery (Fusion L4 SI, tonsilectomy, tubal ligation, sinus surgery).  Current medications:  Anti-inflammatory, cardiac medication, muscle relaxants, pain medication, sleep medication and thyroid medication (Consitipation,   reflux, migraines).  Employment status: Disabled.  Primary job tasks include:  Repetitive tasks (My home tasks are limited due to disability).  Urination:  Do you leak on the way to the bathroom or with a strong urge to void? Yes    Do you leak with cough,sneeze, jumping, running?Yes   Any other activities that cause leaking? Standing up  Do you have triggers that make you feel you can't wait to go to the bathroom? Yes   what are they: dysautonomia flare.  Type of pad and number used per day? 1-2 medium absorbancy  When you leak what is the amount? small  How long can you delay the need to urinate? Not at all.   How many times do you get up to urinate at night? 4-5  Can you stop the flow of urine when on the toilet? Yes  Is the volume of urine passed usually: small. (8sec rule=  250ml with average bladder storing  400-600ml)  Do you strain to pass urine? Yes  Do you have a slow or hesitant urinary stream? Yes  Do you have difficulty initiating the urine stream? Yes  How many bladder infections have you had in last 12 months?none  Fluid intake(one glass is 8oz or one cup) 100 oz glasses/day, 36 oz (3 cans of soda) caffinated glasses/day  0 alcohol glasses/day.  Bowel habits:  Frequency of bowel movements?4-5 times a week  Consistancy of stool? soft, soft formed, hard, [place] Stool Scale ranges from 1-7  Do you ignore the urge to defecate? No  Do you strain to pass stool? Yes  Pelvic Pain:  When do you have pelvic pain? Regularly feels like stomach cramping  Is initial penetration during intercourse painful? Yes  Is deeper penetration painful? Yes  Do you use lubricant? yes What kind? Coconut  oil  Given birth? Yes Any complications?no, # of vaginal delieveries?5, # of episiotomies? 4.  Are you sexually active?Yes  Have you ever been worried for your physical safety? No  Any abdominal or pelvic surgeries? Yes- tubal ligation  Are you having any regular exercise? No - exercise intolerance  Have you practiced the PF(kegel) exercises for 4 or more weeks?yes  Marinoff Scale:Level 2  (Level 3: Abstinence from intercourse because of severe pain. Level 2: Painful intercourse which limites frequency of activity. Level 1: Painful intercourse not severe enough to prevent activity.)  Objective:  System  Pelvic Dysfunction Evaluation:    Flexibility:    Tightness present at:Adductors; Piriformis and Gluteals  Abdominal Wall:  NA  Pelvic Clock Exam:    PATIENTS NAME:  Ada Welch   : 1970    Levator ANI:  ++  External Assessment:    Skin Condition:  Normal  Scars:  Well healed  Bearing Down/Coughing:  Rectocele  Tissue Symmetry:  Normal  Introitus:  Normal  Muscle Contraction/Perineal Mobility:  Slight lift, no urogential triangle descent  Internal Assessment:    Contraction/Grade:  Fair squeeze, definite lift (3)  SEMG Biofeedback:    Equipment:  MR20  [employer] electrode placement--Perianal:  Yes  Baseline EMG PM:  0.7 uV  Peak pelvic muscle contraction:  Endurance hold 15.7 uV, fast twitch holds 10.2 uV.  Did have PFM increased activity when asked to push like she was having BM, showing poor coordination of muscle for relaxation.  EMG interpretation to fatigue:  5-8 seconds  General   ROS  Assessment/Plan:    Patient is a 48 year old female with pelvic complaints.    Patient has the following significant findings with corresponding treatment plan.                Diagnosis 1:  Pelvic floor dysfunction  Pain -  manual therapy, self management, education and home program  Decreased ROM/flexibility - manual therapy, therapeutic exercise, therapeutic activity and home program  Decreased strength -  therapeutic exercise, therapeutic activities and home program  Impaired muscle performance - biofeedback, neuro re-education and home program  Decreased function - therapeutic activities and home program    Therapy Evaluation Codes:   1) History comprised of:   Personal factors that impact the plan of care:      Past/current experiences and Time since onset of symptoms.    Comorbidity factors that impact the plan of care are:      Bowel/bladder changes, Concussion, Migraines/headaches, Numbness/tingling,               Overweight, Weakness and Tom Danlos Syndrome, Dysautonomia, mast cell.     Medications impacting care: Anti-inflammatory and reflux, constipation meds, migranes.      2) Examination of Body Systems comprised of:   Body structures and functions that impact the plan of care:      Pelvis.   Activity limitations that impact the plan of care are:      [place], Stress incontinence, Urinary incontinence and constipation.  3) Clinical presentation characteristics are:   Stable/Uncomplicated.      PATIENTS NAME:  Ada Welch   : 1970    4) Decision-Making    Moderate complexity using standardized patient assessment instrument and/or measureable assessment of functional outcome.  Cumulative Therapy Evaluation is: Moderate complexity.     Communication ability:  Patient appears to be able to clearly communicate and understand verbal and written communication and follow directions correctly.  Treatment Explanation - The following has been discussed with the patient:   RX ordered/plan of care  Anticipated outcomes  Possible risks and side effects  This patient would benefit from PT intervention to resume normal activities.   Rehab potential is good.  Frequency:  1 X week, once daily  Duration:  for 6 weeks tapering to 2 X a month over 2 months   Discharge Plan:  Achieve all LTG.  Independent in home treatment program.  Reach maximal therapeutic benefit.      Caregiver Signature/Credentials  "_____________________________ Date ________       Treating Provider: Taiwo Mittal DPT     I have reviewed and certified the need for these services and plan of treatment while under my care.        PHYSICIAN'S SIGNATURE:   __________________________________  Date___________                        Skye Gutierrez MD    Certification period:  Beginning of Cert date period: 01/03/19 to  End of Cert period date: 04/02/19     Functional Level Progress Report: Please see attached \"Goal Flow sheet for Functional level.\"    ____X____Continue Services or________ DC Services                Service dates: From  SOC Date: 01/03/19 date to present                         "

## 2019-01-07 ENCOUNTER — CARE COORDINATION (OUTPATIENT)
Dept: NEUROLOGY | Facility: CLINIC | Age: 49
End: 2019-01-07

## 2019-01-07 NOTE — PROGRESS NOTES
Mailed out letter that was sitting waiting for patient to  since 08/02/2018. Sent it to be mailed out today at patients address in demographics today at [address] on 01/07/2019.

## 2019-01-08 ENCOUNTER — TELEPHONE (OUTPATIENT)
Dept: GASTROENTEROLOGY | Facility: CLINIC | Age: 49
End: 2019-01-08

## 2019-01-08 ENCOUNTER — TELEPHONE (OUTPATIENT)
Dept: INTERNAL MEDICINE | Facility: CLINIC | Age: 49
End: 2019-01-08

## 2019-01-08 NOTE — TELEPHONE ENCOUNTER
Health Call Center    Phone Message    May a detailed message be left on voicemail: yes    Reason for Call: Symptoms or Concerns     If patient has red-flag symptoms, warm transfer to triage line    Current symptom or concern: Patient is concerned about a recent admittance to the ER on 12/17. She stated she was admitted due to choking on food. The ER did an emergency scope and found that she has a hiatial hernia and stated she would need surgery. Patient is very concerned over recent information provided and stated she is also having neck problems. She would like advise and assistance in what to do next. I did schedule the patient with the first available with PCP at her request but she is concerned that will be too far out still and would like a call back.      Action Taken: Message routed to:  [facility] ([facility] SHAUNA

## 2019-01-08 NOTE — TELEPHONE ENCOUNTER
Pt called to schedule an appt, for hosptial follow up, with Dr. Griffith; however, first available is 2/27/19.  Pt states she was in hospital as her food got stuck in her hiatal hernia again.  As this is the 2nd time, they told her to talk to GI doctor about surgery.  Therefore, pt cannot wait until 2/27/19 and would like to be seen sooner.  Please follow up with pt.  Thank you!

## 2019-01-08 NOTE — PROGRESS NOTES
[facility] Initial Evaluation  Subjective:                                       Pertinent medical history includes:  Concussions/dizziness, incontinence, migraines/headaches, overweight and thyroid problems (Numbness/tingling, changes in bowel/bladder, cold/hot extremity, non-healing wounds numbness in perianal region, pain at night/.rest, persistent fever/chills, progressive neurological deficits, severe headaches, significant weakness, dysauticinomia, colette).  Medical allergies: no.  Other surgeries include:  Orthopedic surgery (Fusion L4 SI, tonsilectomy, tubal ligation, sinus surgery).  Current medications:  Anti-inflammatory, cardiac medication, muscle relaxants, pain medication, sleep medication and thyroid medication (Consitipation, reflux, migraines).    Employment status: Disabled.  Primary job tasks include:  Repetitive tasks (My home tasks are limited due to disability).                                Objective:  System    Physical Exam    General     ROS    Assessment/Plan:

## 2019-01-09 DIAGNOSIS — G43.909 MIGRAINE WITHOUT STATUS MIGRAINOSUS, NOT INTRACTABLE, UNSPECIFIED MIGRAINE TYPE: ICD-10-CM

## 2019-01-09 NOTE — TELEPHONE ENCOUNTER
Pt choked on food the week before the holidays, ended up going to ED. Per GI, recommended getting hiatal hernia surgery, but not able to get an appointment until February. Pt also concerned about headache and neck pain. Wants to be seen earlier. Appt offered with Dr Rivera for 1/10 at 11:55am, pt agrees and appt made. Jeri Rosado CMA at 9:22 AM on 1/9/2019

## 2019-01-10 ENCOUNTER — THERAPY VISIT (OUTPATIENT)
Dept: PHYSICAL THERAPY | Facility: CLINIC | Age: 49
End: 2019-01-10
Payer: COMMERCIAL

## 2019-01-10 ENCOUNTER — OFFICE VISIT (OUTPATIENT)
Dept: INTERNAL MEDICINE | Facility: CLINIC | Age: 49
End: 2019-01-10
Payer: COMMERCIAL

## 2019-01-10 ENCOUNTER — TELEPHONE (OUTPATIENT)
Dept: SURGERY | Facility: CLINIC | Age: 49
End: 2019-01-10

## 2019-01-10 VITALS
WEIGHT: 217 LBS | SYSTOLIC BLOOD PRESSURE: 107 MMHG | DIASTOLIC BLOOD PRESSURE: 72 MMHG | TEMPERATURE: 98.7 F | HEART RATE: 59 BPM | RESPIRATION RATE: 16 BRPM | OXYGEN SATURATION: 97 % | BODY MASS INDEX: 33.99 KG/M2

## 2019-01-10 DIAGNOSIS — K59.00 CONSTIPATION: ICD-10-CM

## 2019-01-10 DIAGNOSIS — R32 URINARY INCONTINENCE: ICD-10-CM

## 2019-01-10 DIAGNOSIS — G44.219 EPISODIC TENSION-TYPE HEADACHE, NOT INTRACTABLE: ICD-10-CM
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
## 2019-01-10 DIAGNOSIS — M47.812 CERVICAL SPONDYLOSIS WITHOUT MYELOPATHY: ICD-10-CM

## 2019-01-10 DIAGNOSIS — T18.128S FOOD IMPACTION OF ESOPHAGUS, SEQUELA: Primary | ICD-10-CM

## 2019-01-10 DIAGNOSIS — K44.9 HIATAL HERNIA: ICD-10-CM

## 2019-01-10 DIAGNOSIS — K44.9 HIATAL HERNIA: Primary | ICD-10-CM

## 2019-01-10 DIAGNOSIS — Q79.60 EHLERS-DANLOS SYNDROME: ICD-10-CM

## 2019-01-10 DIAGNOSIS — M99.05 PELVIC SOMATIC DYSFUNCTION: ICD-10-CM

## 2019-01-10 PROCEDURE — 97140 MANUAL THERAPY 1/> REGIONS: CPT | Mod: GP | Performed by: PHYSICAL THERAPIST

## 2019-01-10 PROCEDURE — 97110 THERAPEUTIC EXERCISES: CPT | Mod: GP | Performed by: PHYSICAL THERAPIST

## 2019-01-10 RX ORDER — BUTALBITAL, ACETAMINOPHEN AND CAFFEINE 50; 325; 40 MG/1; MG/1; MG/1
TABLET ORAL
Qty: 10 TABLET | Refills: 0 | Status: SHIPPED | OUTPATIENT
Start: 2019-01-10 | End: 2019-02-05

## 2019-01-10 ASSESSMENT — PAIN SCALES - GENERAL: PAINLEVEL: SEVERE PAIN (7)

## 2019-01-10 NOTE — TELEPHONE ENCOUNTER
ONCOLOGY INTAKE: Records Information      APPT INFORMATION:  Referring provider:  Nicole  Referring provider s clinic:  [facility] MEDICINE  Reason for visit/diagnosis:  Hiatal Hernia    Were the records received with the referral (via [employer])? No, internal referral    Has patient been seen for any external appt for this diagnosis (enter clinic/location)? no

## 2019-01-10 NOTE — PATIENT INSTRUCTIONS
Primary Care Center Medication Refill Request Information:  * Please contact your pharmacy regarding ANY request for medication refills.  ** [facility] Prescription Fax = [phone]  * Please allow 3 business days for routine medication refills.  * Please allow 5 business days for controlled substance medication refills.     [facility] Test Result notification information:  *You will be notified with in 7-10 days of your appointment day regarding the results of your test.  If you are on MyChart you will be notified as soon as the provider has reviewed the results and signed off on them.    Primary Care Center: [phone]     Surgery Clinic [phone] ([facility], 4th Floor)    Psychiatry [phone] ([facility]) Please put Pt. Label at

## 2019-01-10 NOTE — PROGRESS NOTES
"[facility]   Ellyn Rivera MD  01/10/2019      Chief Complaint:   Hospital F/U     History of Present Illness:   Ada Welch is a 48 year old female with a history of Tom Danlos syndrome, eosinophilic esophagitis, nasal polyps, IBS, chronic migraines, chronic pain syndrome who presented to the Emergency Department on December 17, 2018 with complaints of nausea and vomiting. Endoscopy revealed a hiatal hernia and she was advised she will need surgery by the gastroenterologist who performed the endoscopy. She presents in clinic today for follow up.    ED Follow Up  At presentation to the ED she was unable to swallow or keep fluids down after having steak for supper 14 hours before. She felt a piece of steak was stuck and notes that she has had similar problems with steak in the past. She had been introducing regular foods again after being on a soft food diet \"for a couple months.\" A hiatal hernia was confirmed by upper GI endoscopy on October 22, 2018. She presented with weakness, headache, decreased concentration and dysphoric mood. Her blood pressure at presentation was 149/93 and she was described as \"nervous/anxious.\" Cardiac workup was negative. She was evaluated with chest x-ray and EKG, which were unremarkable. The patient was given IV fluids. Her symptoms were not changed with Reglan. She was given Protonix and Zofran via IV. Endoscopy revealed a meat bolus, which was causing her symptoms. Patient was discharged to home with instructions to follow up.     The patient reports that this is the second occurrence within the year of a bolus being stuck in the esophagus. She reports that she is unable to eat solid food. Moreover, she confirms that she has a history of heartburn and acid reflux for which she takes 40mg Omeprazole BID. She states that EOE was found on her August endoscopy, which made \"the reflux was so bad\" but believes that this has been ruled out by biopsy in " "October.    Headache  The patient complains of an ongoing headache for three weeks which feels like \"pushing\" on the side of her head. The pain runs down the side of her neck, which she describes as \"different.\" The headache is accompanied by ear popping, chills, pressure in the back of her head, clamminess, and nausea. The ear popping and pressure are new for her, but the remainder of her symptoms are like her previous headaches; the ringing in her ears and numbness on the left side of her face are consistent with her previous. In particular, she feels this is similar to a the headache she had this summer during which she experienced fatigue and chills. She recalls that the headache in the summer was unresponsive to her medications, and she was not able to find an effective treatment. Her headache is similar in that nature too--the prescribed Maxalt, Tizanidine, and injections are not effective. Patient will be following up with neurologist Dr. Nguyen in one week.     The patient reports that she has tried \"everything in her toolbox\" to alleviate the symptoms, including ice, heat, stretching, and medications. She reports that her Aimovig injections have eased symptoms of sharp side of head pain but not the pressure in the back of her head. Medical cannabis provides her with an increased ability to sleep and a relief of back pain but not headache. Prednisone also has not helped the headache. She reports negative reactions to Botox and similar \"toxin\" injections.  She reports she had rheumatic fever when she was younger.    Review of Systems:   Pertinent items are noted in HPI, remainder of complete ROS is negative.      Active Medications:      ACETAMINOPHEN PO, Take 1,000 mg by mouth every 6 hours as needed for pain Take with tramadol, Disp: , Rfl:      albuterol (PROAIR HFA/PROVENTIL HFA/VENTOLIN HFA) 108 (90 BASE) MCG/ACT Inhaler, Inhale 2 puffs into the lungs every 6 hours as needed , Disp: , Rfl:      " AMITRIPTYLINE HCL PO, Take 50 mg by mouth At Bedtime , Disp: , Rfl:      azithromycin (ZITHROMAX) 250 MG tablet, Two tablets first day, then one tablet daily for four days., Disp: 6 tablet, Rfl: 0     buprenorphine (BUTRANS) 10 MCG/HR WK patch, Place 1 patch onto the skin every 7 days, Disp: 4 patch, Rfl: 2     butalbital-acetaminophen-caffeine (FIORICET/ESGIC) -40 MG tablet, TAKE ONE TABLET BY MOUTH EVERY 4 HOURS AS NEEDED, Disp: 10 tablet, Rfl: 0     cetirizine (ZYRTEC) 10 MG tablet, Take 10 mg by mouth daily , Disp: , Rfl:      COMPRESSION STOCKINGS, 1 each daily 20-30 mmHg Thigh Length measure and fit, color and style per patient preference. Doff n jose per need., Disp: 3 each, Rfl: 3     DULoxetine (CYMBALTA) 30 MG capsule, Take 60mg in the morning and 30mg at bedtime, Disp: 270 capsule, Rfl: 1     eletriptan (RELPAX) 20 MG tablet, Take 1 tablet (20 mg) by mouth at onset of headache for migraine May repeat in 2 hours. Max 4 tablets/24 hours., Disp: 18 tablet, Rfl: 1     EPINEPHrine (EPIPEN/ADRENACLICK/OR ANY BX GENERIC EQUIV) 0.3 MG/0.3ML injection 2-pack, Inject 0.3 mg into the muscle, Disp: , Rfl:      erenumab-aooe (AIMOVIG 140 DOSE) 70 MG/ML injection, Inject 2 mLs (140 mg) Subcutaneous every 30 days, Disp: 2 pen, Rfl: 11     famotidine (PEPCID) 20 MG tablet, Take 1 tablet (20 mg) by mouth At Bedtime, Disp: 90 tablet, Rfl: 1     fluticasone (FLONASE) 50 MCG/ACT spray, INSTILL 2 SPRAYS INTO BOTH NOSTRILS DAILY, Disp: 48 mL, Rfl: 3     fluticasone-salmeterol (ADVAIR DISKUS) 250-50 MCG/DOSE diskus inhaler, Inhale 1 puff into the lungs 2 times daily as needed , Disp: , Rfl:      levothyroxine (SYNTHROID) 25 MCG tablet, Take 1 tablet (25 mcg) by mouth daily, Disp: 90 tablet, Rfl: 3     linaclotide (LINZESS) 145 MCG capsule, Take 1 capsule (145 mcg) by mouth every morning (before breakfast) Last refill until GI clinic follow up., Disp: 90 capsule, Rfl: 2     medical cannabis (Patient's own supply.  Not a  prescription), Take 1 Dose by mouth See Admin Instructions (This is NOT a prescription, and does not certify that the patient has a qualifying medical condition for medical cannabis.  The purpose of this order is  to document that the patient reports taking medical cannabis.), Disp: , Rfl:      metoclopramide (REGLAN) 5 MG tablet, Take 1-2 tablets (5-10 mg) by mouth every 6 hours as needed, Disp: 180 tablet, Rfl: 1     Multiple Vitamins-Minerals (MULTIVITAMIN PO), Take 1 tablet by mouth daily , Disp: , Rfl:      Nerve Stimulator (CEFALY KIT) KAROLINE, 1 Device daily as needed, Disp: 1 Device, Rfl: 0     omeprazole (PRILOSEC) 40 MG capsule, Take 1 capsule (40 mg) by mouth 2 times daily as needed (prn), Disp: 180 capsule, Rfl: 2     ondansetron (ZOFRAN-ODT) 4 MG ODT tab, Take 1-2 tablets (4-8 mg) by mouth every 12 hours as needed for nausea, Disp: 60 tablet, Rfl: 1     oxyCODONE IR (ROXICODONE) 5 MG tablet, Take 0.5 tablets (2.5 mg) by mouth every 6 hours as needed for severe pain, Disp: 15 tablet, Rfl: 0     predniSONE (DELTASONE) 20 MG tablet, Take 1 tablet (20 mg) by mouth 2 times daily, Disp: 10 tablet, Rfl: 0     Probiotic Product (PROBIOTIC DAILY PO), Take 2 packets by mouth every morning , Disp: , Rfl:      Propranolol HCl SR Beads 120 MG CP24, Take 120 mg by mouth daily, Disp: 30 capsule, Rfl: 11     rizatriptan (MAXALT-MLT) 5 MG ODT tab, TAKE 1-2 TABLETS (5-10 MG) BY MOUTH AT ONSET OF HEADACHE FOR MIGRAINE (MAX 30 MG IN 24 HOURS), Disp: , Rfl: 6     SUMAtriptan (IMITREX STATDOSE) 6 MG/0.5ML pen injector kit, Inject 0.5 ml (6 mg) subcutaneously at onset of migraine, May repeat in 1 hour , Max 12 mg/24 hrs, Disp: 2 kit, Rfl: 1     tiZANidine (ZANAFLEX) 4 MG tablet, Take 1 tablet (4 mg) by mouth 4 times daily as needed for muscle spasms, Disp: 360 tablet, Rfl: 1     traMADol (ULTRAM) 50 MG tablet, Take 1 tablet (50 mg) by mouth every 6 hours as needed for severe pain, Disp: 60 tablet, Rfl: 1     vitamin B complex  with vitamin C (VITAMIN  B COMPLEX) TABS tablet, Take 1 tablet by mouth daily, Disp: , Rfl:      VITAMIN D, CHOLECALCIFEROL, PO, Take 2,000 Units by mouth daily, Disp: , Rfl:      ZONISAMIDE PO, Take 75 mg by mouth daily, Disp: , Rfl:       Allergies:   Bee venom; No clinical screening - see comments; Chicken-derived products (egg); Compazine [prochlorperazine]; Food; Gabapentin; Gluten meal; Pregabalin; Topiramate; Wheat; and Wheat bran      Past Medical History:  Allergic rhinitis   Blood clotting disorder   Chronic tonsillitis   Tom-Danlos disease   Eosinophilic esophagitis   Gastroesophageal reflux disease (GERD)   Hiatal hernia   Hoarseness   Irritable bowel syndrome (IBS) with constipation   Nasal polyps   Autonomic nervous system disorder   Pneumonia   Acquired hypothyroidism   Chronic pain syndrome   Postlaminectomy syndrome, lumbar region   Cervicalgia   Slow transit constipation   Cardiac device in situ   Anxiety disorder  Astigmatism  Chronic sinusitis with recurrent bronchitis  Arthritis, multiple sites  Cyst of fallopian tube  Depression  Deviated nasal septum  Essential hypertension with goal blood pressure less than 140/90  Head and neck lymphadenopathy  Hepatomegaly  Keratoconjunctivitis sicca   Liver lesion  Lumbar degenerative disc disease  Lumbar radiculopathy  Intractable chronic migraine without aura and without status migrainosus  Nasal turbinate hypertrophy  Non compliance with medical treatment  Obese  Opioid type dependence, continuous   Other acute postoperative pain  Pain disorder associated with psychological and physical factors  Presbyopia  Spondylolisthesis of lumbar region  Uterine endometriosis     Past Surgical History:  Adenoidectomy   Nasal septum repair   Spinal fusion L4-S1  Mole biopsy   Lymph node biopsy   Nasal/sinus polypectomy   Tonsillectomy  Tubal ligation     Family History:   EDS - Mother, Sister  Spinal cancer - Father   Migraines - Father, Sister   Diabetes -  Maternal grandmother, Paternal grandmother   Heart disease - Maternal grandmother   Hypertension - Maternal grandmother   Asthma - Sister, Son   Thyroid disease - Sister       Social History:   The patient was alone.  Smoking Status: Former smoker 0.2 PPD for 10 years   Smokeless Tobacco: Never   Alcohol Use: yes  Other drug use: Yes, marijuana, medical       Physical Exam:   /72 (BP Location: Right arm, Patient Position: Sitting, Cuff Size: Adult Large)   Pulse 59   Temp 98.7  F (37.1  C) (Oral)   Resp 16   Wt 98.4 kg (217 lb)   SpO2 97%   BMI 33.99 kg/m       Constitutional: Alert, oriented, pleasant, no acute distress. Mild distress  Head: Normocephalic, atraumatic  Eyes: Extra-ocular movements intact, no scleral icterus  ENT: Oropharynx clear, moist mucus membranes, good dentition.left TM clear. No mastoid tenderness.   Neck: Supple, no lymphadenopathy, limited active range of motion with rotation and extension   Cardiovascular: Regular rate and rhythm, no murmurs, rubs or gallops, peripheral pulses full/symmetric  Respiratory: Good air movement bilaterally, lungs clear, no wheezes/rales/rhonchi  GI: Abdomen soft, bowel sounds present, nondistended, nontender, no organomegaly or masses, no rebound/guarding  Musculoskeletal: No edema, normal muscle tone, ambulates with a cane. Posterior cervical muscles tender to palpation without abnormalities  Neurologic: Alert and oriented, cranial nerves 2-12 intact.  Skin: No rashes/lesions  Psychiatric: normal mentation, affect and mood      Assessment and Plan:  Food impaction of esophagus, sequela  Seen in ED recently for food impaction, endoscopy revealed tortuous esophagus, hiatal hernia and non-obstructing ring. She was advised she might need to consider surgery, though  I see no documentation of this. She plans to follow up with GI as soon as possible.   - THORACIC SURGERY REFERRAL    Hiatal hernia  - THORACIC SURGERY REFERRAL    Episodic tension-type  headache, not intractable  Intractable posterior headache not dissimilar to previous headaches. I am somewhat at a loss as to what to suggest as she seems to have tried numerous medications, conservative therapies and injections in the past. She has follow up with neurology next week. Although I don't favor long term use, she requested to try fioricet to break the headache.  - butalbital-acetaminophen-caffeine (FIORICET/ESGIC) -40 MG tablet  Dispense: 10 tablet; Refill: 0    Cervical spondylosis without myelopathy  - PHYSIATRY (PM&R) REFERRAL    Tom-Danlos syndrome  - PHYSIATRY (PM&R) REFERRAL       Follow-up: prn        Scribe Disclosure:   I, Lisandra Guillen, am serving as a scribe to document services personally performed by Ellyn Rivera MD at this visit, based upon the provider's statements to me. All documentation has been reviewed by the aforementioned provider prior to being entered into the official medical record.     Portions of this medical record were completed by a scribe. UPON MY REVIEW AND AUTHENTICATION BY ELECTRONIC SIGNATURE, this confirms (a) I performed the applicable clinical services, and (b) the record is accurate.   Ellyn Rivera MD  Internal Medicine    25 min spent face to face, of which >50% time spent on counseling/coordinating care exclusive of any procedure time

## 2019-01-10 NOTE — NURSING NOTE
Chief Complaint   Patient presents with     Hospital F/U     Pt is here for follow up from the ED. Complains of headache.         Emigdio Siddiqi, EMT on 1/10/2019 at 11:54 AM

## 2019-01-11 RX ORDER — RIZATRIPTAN BENZOATE 5 MG/1
TABLET, ORALLY DISINTEGRATING ORAL
Qty: 20 TABLET | Refills: 5 | OUTPATIENT
Start: 2019-01-11

## 2019-01-15 ENCOUNTER — OFFICE VISIT (OUTPATIENT)
Dept: NEUROLOGY | Facility: CLINIC | Age: 49
End: 2019-01-15
Payer: COMMERCIAL

## 2019-01-15 VITALS
BODY MASS INDEX: 34.06 KG/M2 | WEIGHT: 217 LBS | HEIGHT: 67 IN | OXYGEN SATURATION: 97 % | SYSTOLIC BLOOD PRESSURE: 126 MMHG | DIASTOLIC BLOOD PRESSURE: 75 MMHG | HEART RATE: 98 BPM

## 2019-01-15 DIAGNOSIS — G43.719 INTRACTABLE CHRONIC MIGRAINE WITHOUT AURA AND WITHOUT STATUS MIGRAINOSUS: Primary | ICD-10-CM

## 2019-01-15 DIAGNOSIS — M54.2 NECK PAIN: ICD-10-CM

## 2019-01-15 DIAGNOSIS — G89.29 OTHER CHRONIC PAIN: ICD-10-CM

## 2019-01-15 RX ORDER — RIZATRIPTAN BENZOATE 5 MG/1
TABLET, ORALLY DISINTEGRATING ORAL
Qty: 9 TABLET | Refills: 11 | Status: SHIPPED | OUTPATIENT
Start: 2019-01-15 | End: 2019-10-23

## 2019-01-15 RX ORDER — BUPRENORPHINE 10 UG/H
1 PATCH TRANSDERMAL
COMMUNITY
Start: 2017-01-26 | End: 2019-01-17

## 2019-01-15 RX ORDER — PROPRANOLOL HYDROCHLORIDE 120 MG/1
120 CAPSULE, EXTENDED RELEASE ORAL DAILY
Refills: 11 | COMMUNITY
Start: 2018-11-28 | End: 2019-08-23

## 2019-01-15 RX ORDER — ONDANSETRON 4 MG/1
4 TABLET, FILM COATED ORAL
COMMUNITY
Start: 2015-11-25 | End: 2019-09-19

## 2019-01-15 RX ORDER — SENNOSIDES A AND B 8.6 MG/1
17.2-34.4 TABLET, FILM COATED ORAL
COMMUNITY
End: 2020-01-23

## 2019-01-15 RX ORDER — METOCLOPRAMIDE 10 MG/1
10 TABLET ORAL
Qty: 40 TABLET | Refills: 3 | Status: SHIPPED | OUTPATIENT
Start: 2019-01-15 | End: 2019-05-24

## 2019-01-15 ASSESSMENT — ENCOUNTER SYMPTOMS
SINUS CONGESTION: 1
SEIZURES: 0
MUSCLE CRAMPS: 1
PARALYSIS: 0
DIFFICULTY URINATING: 1
LOSS OF CONSCIOUSNESS: 1
POSTURAL DYSPNEA: 1
PARALYSIS: 0
DYSPNEA ON EXERTION: 1
BOWEL INCONTINENCE: 0
STIFFNESS: 1
DISTURBANCES IN COORDINATION: 1
POSTURAL DYSPNEA: 1
SORE THROAT: 1
POLYDIPSIA: 1
SKIN CHANGES: 0
SNORES LOUDLY: 1
MEMORY LOSS: 1
SYNCOPE: 1
MYALGIAS: 1
BLOATING: 1
NIGHT SWEATS: 1
COUGH DISTURBING SLEEP: 0
CHILLS: 1
DECREASED APPETITE: 1
DECREASED APPETITE: 1
NECK PAIN: 1
DISTURBANCES IN COORDINATION: 1
SKIN CHANGES: 0
TINGLING: 1
EYE IRRITATION: 1
BOWEL INCONTINENCE: 0
HYPOTENSION: 1
MUSCLE WEAKNESS: 1
HYPERTENSION: 0
TROUBLE SWALLOWING: 1
DOUBLE VISION: 1
ALTERED TEMPERATURE REGULATION: 1
BLOATING: 1
HEADACHES: 1
WEAKNESS: 1
WEIGHT GAIN: 1
HYPOTENSION: 1
NECK MASS: 1
FLANK PAIN: 1
HEADACHES: 1
SMELL DISTURBANCE: 1
EYE REDNESS: 1
DIARRHEA: 1
MUSCLE WEAKNESS: 1
FATIGUE: 1
HEMOPTYSIS: 0
DIZZINESS: 1
MEMORY LOSS: 1
ALTERED TEMPERATURE REGULATION: 1
SYNCOPE: 1
JAUNDICE: 0
SMELL DISTURBANCE: 1
HYPERTENSION: 0
NAUSEA: 1
BACK PAIN: 1
SWOLLEN GLANDS: 1
PALPITATIONS: 1
VOMITING: 1
HOARSE VOICE: 1
WHEEZING: 1
DYSURIA: 1
TASTE DISTURBANCE: 1
HEARTBURN: 1
POOR WOUND HEALING: 1
BRUISES/BLEEDS EASILY: 1
EYE PAIN: 1
DIARRHEA: 1
SHORTNESS OF BREATH: 1
SPEECH CHANGE: 1
SEIZURES: 0
CONSTIPATION: 1
MUSCLE CRAMPS: 1
WEIGHT GAIN: 1
COUGH: 1
STIFFNESS: 1
MYALGIAS: 1
LIGHT-HEADEDNESS: 1
SPEECH CHANGE: 1
RECTAL PAIN: 0
SHORTNESS OF BREATH: 1
ARTHRALGIAS: 1
POOR WOUND HEALING: 1
HEMATURIA: 0
INCREASED ENERGY: 1
LEG PAIN: 1
HEMOPTYSIS: 0
FEVER: 1
DIFFICULTY URINATING: 1
SINUS CONGESTION: 1
VOMITING: 1
NUMBNESS: 1
NAIL CHANGES: 0
ABDOMINAL PAIN: 1
ORTHOPNEA: 1
COUGH DISTURBING SLEEP: 0
DIZZINESS: 1
SLEEP DISTURBANCES DUE TO BREATHING: 0
BLOOD IN STOOL: 0
FATIGUE: 1
ARTHRALGIAS: 1
BACK PAIN: 1
NECK MASS: 1
EYE WATERING: 1
NUMBNESS: 1
SPUTUM PRODUCTION: 1
SORE THROAT: 1
SPUTUM PRODUCTION: 1
NAIL CHANGES: 0
SWOLLEN GLANDS: 1
EYE PAIN: 1
EYE IRRITATION: 1
HEMATURIA: 0
JOINT SWELLING: 1
CONSTIPATION: 1
WEAKNESS: 1
SINUS PAIN: 1
TREMORS: 1
SNORES LOUDLY: 1
EXERCISE INTOLERANCE: 1
HOARSE VOICE: 1
SLEEP DISTURBANCES DUE TO BREATHING: 0
CHILLS: 1
NECK PAIN: 1
EXERCISE INTOLERANCE: 1
TROUBLE SWALLOWING: 1
NAUSEA: 1
POLYPHAGIA: 0
EYE WATERING: 1
JOINT SWELLING: 1
WEIGHT LOSS: 0
TASTE DISTURBANCE: 1
BLOOD IN STOOL: 0
DOUBLE VISION: 1
DYSPNEA ON EXERTION: 1
HALLUCINATIONS: 0
EYE REDNESS: 1
POLYPHAGIA: 0
FLANK PAIN: 1
WEIGHT LOSS: 0
TREMORS: 1
LEG PAIN: 1
COUGH: 1
POLYDIPSIA: 1
LOSS OF CONSCIOUSNESS: 1
TINGLING: 1
ABDOMINAL PAIN: 1
WHEEZING: 1
NIGHT SWEATS: 1
HALLUCINATIONS: 0
FEVER: 1
PALPITATIONS: 1
BRUISES/BLEEDS EASILY: 1
INCREASED ENERGY: 1
LIGHT-HEADEDNESS: 1
ORTHOPNEA: 1
DYSURIA: 1
SINUS PAIN: 1
JAUNDICE: 0
HEARTBURN: 1
RECTAL PAIN: 0

## 2019-01-15 ASSESSMENT — MIFFLIN-ST. JEOR: SCORE: 1651.94

## 2019-01-15 ASSESSMENT — PAIN SCALES - GENERAL: PAINLEVEL: EXTREME PAIN (8)

## 2019-01-15 NOTE — PROGRESS NOTES
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  In that setting she has been noted to have a small Chiari malformation that neurosurgery has elected not to intervene on.  She also has chronic headache pains.  She is failed previous preventive medication treatments.  She has declined trying Botox because she feels with her diagnosis of Tom Danlos it would be unwise to weaken muscles.  In September 2018 she was initiated on Aimovig.  This was after hospital stay for an intractable migraine.  It should be noted that the patient reports intractable pain that there is never any evidence on presentation of dehydration or intractable vomiting with these migraines.    The patient reports that the Aimovig has helped only one type of headache pain that she was experiencing.  She describes that as a shooting pain going from the back of her head to the front of her head.  That went from occurring several times a week to several times a month only now.  She is having no untoward side effects from the Aimovig otherwise.    The patient presents today having been lost to follow-up because of complaints of increased pain in the back of her head.  She reports that pressure-like pain right at the base of the skull that is constantly present to varying degrees.  It has become more severe and spread in diameter over the last few weeks.  She reports that it sometimes is alleviated by wearing the neck brace but oftentimes that makes other pains worse.  She feels as though holding her head upright makes the pain worse.  It is generally a tense pressure pain there but she reports that it is affecting her ability to think clearly such as typing a text message out to her family.  There is no nausea or vomiting with  these types of headaches.  She reports that she had received Fioricet from her primary care provider and has used 10 tablets in the past few weeks.  She does think this helps but that is the only thing that helps.  This patient is taking chronic narcotics, medical cannabis as well as tizanidine at this time.  She works with the pain clinic on pain management.    She notices that moving the head makes the pain worse she also feels as though coughing and sneezing makes the pain worse.    The patient does demonstrate some maladaptive pain behavior such as a disrupted sleep schedule when she is in worse pain.  She is reporting that symptoms she is sleeping as many as 18 hours a day and other times only 2 hours a day.  The emphasis on routine sleep and routine exercise has been once again discussed with the patient.  She reports that she is unable to exercise due to discomfort.  She also reports she is unable to exercise due to symptoms of POTS.    ROS:See end of note    Current Outpatient Medications   Medication     ACETAMINOPHEN PO     albuterol (PROAIR HFA/PROVENTIL HFA/VENTOLIN HFA) 108 (90 BASE) MCG/ACT Inhaler     AMITRIPTYLINE HCL PO     buprenorphine (BUTRANS) 10 MCG/HR WK patch     buprenorphine (BUTRANS) 10 MCG/HR WK patch     butalbital-acetaminophen-caffeine (FIORICET/ESGIC) -40 MG tablet     cetirizine (ZYRTEC) 10 MG tablet     COMPRESSION STOCKINGS     DULoxetine (CYMBALTA) 30 MG capsule     eletriptan (RELPAX) 20 MG tablet     EPINEPHrine (EPIPEN/ADRENACLICK/OR ANY BX GENERIC EQUIV) 0.3 MG/0.3ML injection 2-pack     famotidine (PEPCID) 20 MG tablet     fluticasone (FLONASE) 50 MCG/ACT spray     fluticasone-salmeterol (ADVAIR DISKUS) 250-50 MCG/DOSE diskus inhaler     levothyroxine (SYNTHROID) 25 MCG tablet     linaclotide (LINZESS) 145 MCG capsule     medical cannabis (Patient's own supply.  Not a prescription)     metoclopramide (REGLAN) 10 MG tablet     metoclopramide (REGLAN) 5 MG tablet      "Multiple Vitamins-Minerals (MULTIVITAMIN PO)     Nerve Stimulator (CEFALY KIT) KAROLINE     omeprazole (PRILOSEC) 40 MG capsule     ondansetron (ZOFRAN) 4 MG tablet     ondansetron (ZOFRAN-ODT) 4 MG ODT tab     oxyCODONE IR (ROXICODONE) 5 MG tablet     Probiotic Product (PROBIOTIC DAILY PO)     Propranolol HCl SR Beads 120 MG CP24     rizatriptan (MAXALT-MLT) 5 MG ODT     SUMAtriptan (IMITREX STATDOSE) 6 MG/0.5ML pen injector kit     tiZANidine (ZANAFLEX) 4 MG tablet     traMADol (ULTRAM) 50 MG tablet     vitamin B complex with vitamin C (VITAMIN  B COMPLEX) TABS tablet     VITAMIN D, CHOLECALCIFEROL, PO     ZONISAMIDE PO     propranolol ER (INDERAL LA) 120 MG 24 hr capsule     senna (SENOKOT) 8.6 MG tablet     No current facility-administered medications for this visit.      PE:  /75 (BP Location: Right arm)   Pulse 98   Ht 1.702 m (5' 7\")   Wt 98.4 kg (217 lb)   LMP 12/28/2018   SpO2 97%   BMI 33.99 kg/m    General appearance: The patient is well-groomed and cooperative with examination to the best of her ability.  She does spend most of the appointment leaning over in her chair because she reports sitting upright makes her head hurt worse    Neurological examination: The patient is awake and alert.  There is no aphasia or dysarthria on examination and her attention and recall are intact.  She voices concern over a mistyped text message and having some difficulty finding words but examination reveals no problems.  On observation the patient has normal neck range of motion.  She has no evidence of nuchal rigidity.  Cranial nerves II through XII intact.  Reflexes in the upper and lower extremities are normal    Impression/Recommendations  1.  Chronic pain  The patient is reporting a flare in her chronic pain syndrome.  Now it is specifically located at the neck/base of the skull.  This is not consistent with a migraine flare and I have discussed this with the patient.No need for hospitalization and if she " presents to ED I would recommend avoiding hospitalization unless there is evidence of dehydration/failure to thrive.     She will work with her pain provider and seeing if any further neck treatments could help such as possible radiofrequency ablation.  I do not believe she would benefit from any surgical decompression from Chiari malformation but if we are not able to achieve pain relief she can revisit with neurosurgery to discuss again.    The patient also needs to reinstitute myofascial release with a physical therapist.  She has not done that for 3 months due to lack of insurance coverage. This may be why her neck pain is worse.    I explained to the patient that I think her difficulty with typing a text message her focusing on her thoughts is not a migraine aura phenomenon but rather just a reaction of her brain to being in pain.    2.  Migraines, chronic migraines    The patient has chronic migraine history that actually has responded well to Aimovig.  She seems a little disgruntled that these pain symptoms that are present are not responding to Aimovig but they really would not be expected to.  I am certainly happy to switch Aimovig to Emgality to see if she gets any additional benefit but I am skeptical that it will provide relief.  The patient has requested the switch and I will order that to see if it makes a difference.    At this time I am not sure there is anything further neurology can offer the patient.  I actually think her migraine headaches are under good control.  This will require primarily management by pain clinic. She will see Ms Telly panchal in 3 months in the neurology department to report in on Emgality effectiveness.    35 minutes with the patient over 50% counseling.    Noemy Nguyen MD [facility]  Department of Neurology  Pager [phone]        Answers for HPI/ROS submitted by the patient on 1/15/2019   General Symptoms: Yes  Skin Symptoms: Yes  HENT Symptoms: Yes  EYE SYMPTOMS: Yes  HEART  SYMPTOMS: Yes  LUNG SYMPTOMS: Yes  INTESTINAL SYMPTOMS: Yes  URINARY SYMPTOMS: Yes  GYNECOLOGIC SYMPTOMS: No  BREAST SYMPTOMS: No  SKELETAL SYMPTOMS: Yes  BLOOD SYMPTOMS: Yes  NERVOUS SYSTEM SYMPTOMS: Yes  MENTAL HEALTH SYMPTOMS: No  Fever: Yes  Loss of appetite: Yes  Weight loss: No  Weight gain: Yes  Fatigue: Yes  Night sweats: Yes  Chills: Yes  Increased stress: No  Excessive hunger: No  Excessive thirst: Yes  Feeling hot or cold when others believe the temperature is normal: Yes  Loss of height: No  Post-operative complications: Yes  Surgical site pain: Yes  Hallucinations: No  Change in or Loss of Energy: Yes  Hyperactivity: No  Confusion: Yes  Changes in hair: No  Changes in moles/birth marks: No  Itching: Yes  Rashes: Yes  Changes in nails: No  Acne: No  Hair in places you don't want it: No  Change in facial hair: No  Warts: No  Non-healing sores: Yes  Scarring: Yes  Flaking of skin: No  Color changes of hands/feet in cold : Yes  Sun sensitivity: Yes  Skin thickening: No  Ear pain: Yes  Ear discharge: Yes  Hearing loss: Yes  Tinnitus: Yes  Nosebleeds: No  Congestion: Yes  Sinus pain: Yes  Trouble swallowing: Yes   Voice hoarseness: Yes  Mouth sores: No  Sore throat: Yes  Tooth pain: No  Gum tenderness: No  Bleeding gums: No  Change in taste: Yes  Change in sense of smell: Yes  Dry mouth: Yes  Hearing aid used: No  Neck lump: Yes  Eye pain: Yes  Vision loss: Yes  Dry eyes: Yes  Watery eyes: Yes  Eye bulging: No  Double vision: Yes  Flashing of lights: Yes  Spots: Yes  Floaters: Yes  Redness: Yes  Crossed eyes: No  Tunnel Vision: Yes  Yellowing of eyes: No  Eye irritation: Yes  Cough: Yes  Sputum or phlegm: Yes  Coughing up blood: No  Difficulty breating or shortness of breath: Yes  Snoring: Yes  Wheezing: Yes  Difficulty breathing on exertion: Yes  Nighttime Cough: No  Difficulty breathing when lying flat: Yes  Chest pain or pressure: Yes  Fast or irregular heartbeat: Yes  Pain in legs with walking:  Yes  Trouble breathing while lying down: Yes  Fingers or toes appear blue: Yes  High blood pressure: No  Low blood pressure: Yes  Fainting: Yes  Murmurs: No  Pacemaker: No  Varicose veins: No  Edema or swelling: Yes  Wake up at night with shortness of breath: No  Light-headedness: Yes  Exercise intolerance: Yes  Heart burn or indigestion: Yes  Nausea: Yes  Vomiting: Yes  Abdominal pain: Yes  Bloating: Yes  Constipation: Yes  Diarrhea: Yes  Blood in stool: No  Black stools: No  Rectal or Anal pain: No  Fecal incontinence: No  Yellowing of skin or eyes: No  Vomit with blood: No  Change in stools: Yes  Trouble holding urine or incontinence: Yes  Pain or burning: Yes  Trouble starting or stopping: Yes  Increased frequency of urination: Yes  Blood in urine: No  Decreased frequency of urination: No  Frequent nighttime urination: Yes  Flank pain: Yes  Difficulty emptying bladder: Yes  Back pain: Yes  Muscle aches: Yes  Neck pain: Yes  Swollen joints: Yes  Joint pain: Yes  Bone pain: Yes  Muscle cramps: Yes  Muscle weakness: Yes  Joint stiffness: Yes  Bone fracture: No  Anemia: No  Swollen glands: Yes  Easy bleeding or bruising: Yes  Trouble with coordination: Yes  Dizziness or trouble with balance: Yes  Fainting or black-out spells: Yes  Memory loss: Yes  Headache: Yes  Seizures: No  Speech problems: Yes  Tingling: Yes  Tremor: Yes  Weakness: Yes  Difficulty walking: Yes  Paralysis: No  Numbness: Yes

## 2019-01-15 NOTE — NURSING NOTE
Chief Complaint   Patient presents with     RECHECK     return   MIGRAINE SINCE CHRISTMAS, NOTING SLURRED SPEAK WHEN AT IT'S WORST AND HARD TIME SEEING STRAIGHT. ALSO NOTES SHE'S DEVELOPED SPONTANEOUS BODY JERKING. nONE OF THE MEDICATIONS ARE HELPING THIS TIME.    So Huynh MA

## 2019-01-15 NOTE — LETTER
1/15/2019       RE: Ada Welch  [address]  [place] [address]     Dear Colleague,    Thank you for referring your patient, Ada Welch, to the [facility] NEUROLOGY at [facility]. Please see a copy of my visit note below.    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  In that setting she has been noted to have a small Chiari malformation that neurosurgery has elected not to intervene on.  She also has chronic headache pains.  She is failed previous preventive medication treatments.  She has declined trying Botox because she feels with her diagnosis of Tom Danlos it would be unwise to weaken muscles.  In September 2018 she was initiated on Aimovig.  This was after hospital stay for an intractable migraine.  It should be noted that the patient reports intractable pain that there is never any evidence on presentation of dehydration or intractable vomiting with these migraines.    The patient reports that the Aimovig has helped only one type of headache pain that she was experiencing.  She describes that as a shooting pain going from the back of her head to the front of her head.  That went from occurring several times a week to several times a month only now.  She is having no untoward side effects from the Aimovig otherwise.    The patient presents today having been lost to follow-up because of complaints of increased pain in the back of her head.  She reports that pressure-like pain right at the base of the skull that is constantly present to varying degrees.  It has become more severe and spread in diameter over the last few weeks.  She reports that it sometimes is alleviated by wearing the neck brace but oftentimes that makes other pains  worse.  She feels as though holding her head upright makes the pain worse.  It is generally a tense pressure pain there but she reports that it is affecting her ability to think clearly such as typing a text message out to her family.  There is no nausea or vomiting with these types of headaches.  She reports that she had received Fioricet from her primary care provider and has used 10 tablets in the past few weeks.  She does think this helps but that is the only thing that helps.  This patient is taking chronic narcotics, medical cannabis as well as tizanidine at this time.  She works with the pain clinic on pain management.    She notices that moving the head makes the pain worse she also feels as though coughing and sneezing makes the pain worse.    The patient does demonstrate some maladaptive pain behavior such as a disrupted sleep schedule when she is in worse pain.  She is reporting that symptoms she is sleeping as many as 18 hours a day and other times only 2 hours a day.  The emphasis on routine sleep and routine exercise has been once again discussed with the patient.  She reports that she is unable to exercise due to discomfort.  She also reports she is unable to exercise due to symptoms of POTS.    ROS:See end of note    Current Outpatient Medications   Medication     ACETAMINOPHEN PO     albuterol (PROAIR HFA/PROVENTIL HFA/VENTOLIN HFA) 108 (90 BASE) MCG/ACT Inhaler     AMITRIPTYLINE HCL PO     buprenorphine (BUTRANS) 10 MCG/HR WK patch     buprenorphine (BUTRANS) 10 MCG/HR WK patch     butalbital-acetaminophen-caffeine (FIORICET/ESGIC) -40 MG tablet     cetirizine (ZYRTEC) 10 MG tablet     COMPRESSION STOCKINGS     DULoxetine (CYMBALTA) 30 MG capsule     eletriptan (RELPAX) 20 MG tablet     EPINEPHrine (EPIPEN/ADRENACLICK/OR ANY BX GENERIC EQUIV) 0.3 MG/0.3ML injection 2-pack     famotidine (PEPCID) 20 MG tablet     fluticasone (FLONASE) 50 MCG/ACT spray     fluticasone-salmeterol (ADVAIR DISKUS)  "250-50 MCG/DOSE diskus inhaler     levothyroxine (SYNTHROID) 25 MCG tablet     linaclotide (LINZESS) 145 MCG capsule     medical cannabis (Patient's own supply.  Not a prescription)     metoclopramide (REGLAN) 10 MG tablet     metoclopramide (REGLAN) 5 MG tablet     Multiple Vitamins-Minerals (MULTIVITAMIN PO)     Nerve Stimulator (CEFALY KIT) KAROLINE     omeprazole (PRILOSEC) 40 MG capsule     ondansetron (ZOFRAN) 4 MG tablet     ondansetron (ZOFRAN-ODT) 4 MG ODT tab     oxyCODONE IR (ROXICODONE) 5 MG tablet     Probiotic Product (PROBIOTIC DAILY PO)     Propranolol HCl SR Beads 120 MG CP24     rizatriptan (MAXALT-MLT) 5 MG ODT     SUMAtriptan (IMITREX STATDOSE) 6 MG/0.5ML pen injector kit     tiZANidine (ZANAFLEX) 4 MG tablet     traMADol (ULTRAM) 50 MG tablet     vitamin B complex with vitamin C (VITAMIN  B COMPLEX) TABS tablet     VITAMIN D, CHOLECALCIFEROL, PO     ZONISAMIDE PO     propranolol ER (INDERAL LA) 120 MG 24 hr capsule     senna (SENOKOT) 8.6 MG tablet     No current facility-administered medications for this visit.      PE:  /75 (BP Location: Right arm)   Pulse 98   Ht 1.702 m (5' 7\")   Wt 98.4 kg (217 lb)   LMP 12/28/2018   SpO2 97%   BMI 33.99 kg/m     General appearance: The patient is well-groomed and cooperative with examination to the best of her ability.  She does spend most of the appointment leaning over in her chair because she reports sitting upright makes her head hurt worse    Neurological examination: The patient is awake and alert.  There is no aphasia or dysarthria on examination and her attention and recall are intact.  She voices concern over a mistyped text message and having some difficulty finding words but examination reveals no problems.  On observation the patient has normal neck range of motion.  She has no evidence of nuchal rigidity.  Cranial nerves II through XII intact.  Reflexes in the upper and lower extremities are normal    Impression/Recommendations  1.  " Chronic pain  The patient is reporting a flare in her chronic pain syndrome.  Now it is specifically located at the neck/base of the skull.  This is not consistent with a migraine flare and I have discussed this with the patient.No need for hospitalization and if she presents to ED I would recommend avoiding hospitalization unless there is evidence of dehydration/failure to thrive.     She will work with her pain provider and seeing if any further neck treatments could help such as possible radiofrequency ablation.  I do not believe she would benefit from any surgical decompression from Chiari malformation but if we are not able to achieve pain relief she can revisit with neurosurgery to discuss again.    The patient also needs to reinstitute myofascial release with a physical therapist.  She has not done that for 3 months due to lack of insurance coverage. This may be why her neck pain is worse.    I explained to the patient that I think her difficulty with typing a text message her focusing on her thoughts is not a migraine aura phenomenon but rather just a reaction of her brain to being in pain.    2.  Migraines, chronic migraines    The patient has chronic migraine history that actually has responded well to Aimovig.  She seems a little disgruntled that these pain symptoms that are present are not responding to Aimovig but they really would not be expected to.  I am certainly happy to switch Aimovig to Emgality to see if she gets any additional benefit but I am skeptical that it will provide relief.  The patient has requested the switch and I will order that to see if it makes a difference.    At this time I am not sure there is anything further neurology can offer the patient.  I actually think her migraine headaches are under good control.  This will require primarily management by pain clinic. She will see Ms Telly panchal in 3 months in the neurology department to report in on Emgality effectiveness.    35  minutes with the patient over 50% counseling.    Noemy Nguyen MD [facility]  Department of Neurology  Pager [phone]

## 2019-01-15 NOTE — PATIENT INSTRUCTIONS
See Dr Martinez to discuss whether radiofrequency ablation will help.    If that is not a possibility or encinas not help ain we will then send you to Ms Ang in Neurosurgery again.    Re-establish care with a therapist for myofascial release    Stop Aimovig.  Start Emgality. First month you give yourself two injections and then one injection each month after.

## 2019-01-16 ENCOUNTER — OFFICE VISIT (OUTPATIENT)
Dept: PALLIATIVE MEDICINE | Facility: CLINIC | Age: 49
End: 2019-01-16
Payer: MEDICARE

## 2019-01-16 ENCOUNTER — TELEPHONE (OUTPATIENT)
Dept: INTERNAL MEDICINE | Facility: CLINIC | Age: 49
End: 2019-01-16

## 2019-01-16 DIAGNOSIS — Q79.60 EHLERS-DANLOS SYNDROME: ICD-10-CM

## 2019-01-16 DIAGNOSIS — G89.4 PAIN SYNDROME, CHRONIC: Primary | ICD-10-CM

## 2019-01-16 PROCEDURE — 99207 ZZC NO BILLABLE SERVICE THIS VISIT: CPT | Performed by: PHYSICAL THERAPIST

## 2019-01-16 NOTE — PROGRESS NOTES
"Pain Physical Therapy Progress Note    Patient Name: Ada Welch     YOB: 1970     Medical Record Number: [id number]    Visits: 3/13    Diagnosis:    Pain syndrome, chronic  Tom-Danlos syndrome    Patient reports having problems with chronic migraine headache since Dec 2018.  She is currently being followed by Dr. Nguyen for headaches, Dr. Ang neurosurgeon and Dr. Martinez in Pain Management.  She referred herself here today wondering if there are any other \"hacks\" for treating her chronic pain and HA.   She had good results with attending [facility] for MFR 1-2 x/week for the past year.  She has been unable to continue these treatments due to insurance changes.  She is most interested in continuing regular MFR modality with this provider or within the [facility].  I reviewed with her the self care strategies from our last visit on 5/17/2018 which she is still doing and discussed with her that I do not offer MFR modality.    I gave her information to find a MFR practitioner in [place] that might accept her insurance.    Courtesy visit: No charge    Therapist: Ruth Esparza PT             Date: 1/16/2019            "

## 2019-01-16 NOTE — TELEPHONE ENCOUNTER
Patient is being seen in the appropriate  clinic to follow up with her hiatal hernia. No need for GI clinic visit at this time

## 2019-01-16 NOTE — TELEPHONE ENCOUNTER
Records received from [facility] sent to [facility] for urgent scanning.  1:50 PM

## 2019-01-16 NOTE — TELEPHONE ENCOUNTER
RECORDS STATUS - ALL OTHER DIAGNOSIS      RECORDS RECEIVED FROM: January 16, 2019     DATE RECEIVED:Epic    NOTES STATUS DETAILS   OFFICE NOTE from referring provider Dr crum     OFFICE NOTE from medical oncologist None per pt     DISCHARGE SUMMARY from hospital None per pt     DISCHARGE REPORT from the ER ER -12/17    OPERATIVE REPORT None per pt     MEDICATION LIST Epic     CLINICAL TRIAL TREATMENTS TO DATE None per patient     LABS     PATHOLOGY REPORTS Epic     ANYTHING RELATED TO DIAGNOSIS [facility] - records sent to [facility] for urgent scanning     GENONOMIC TESTING     TYPE: None per pt     IMAGING (NEED IMAGES & REPORT)     [facility] pt     MRI Epic     MAMMO NA     ULTRASOUND Epic     PET NA

## 2019-01-16 NOTE — TELEPHONE ENCOUNTER
Called pt to attempt to obtain medical hx   No other significant past medical history or family history.   12:19 PM

## 2019-01-16 NOTE — PROGRESS NOTES
THORACIC SURGERY - NEW PATIENT OFFICE VISIT      Dear Dr. Rivera, Dr. Ruvalcaba,    I saw Ms. Welch at Dr. Rivera  request in consultation for the evaluation and treatment of a hiatal hernia and dysphagia.     HPI  Ms. Ada Welch is a 48 year old year-old female with history of Tom Danlos syndrome, eosinophilic esophagitis, GERD, nasal polyps, IBS, chronic migraines, chronic pain syndrome who presents with issues with dysphagia and reflux. The patient has long standing history of refulx disease and is on omeprazole 40 mg BID. She presented to the ED multiple  with impacted fluid  She underwent an EGD that showed an impacted fluid bolus and a hiatal hernia.  Ada reports that her symptoms have been going on for years.  Although her reflux symptoms have improved since she started omeprazole, her dysphagia has not gotten better.  She has been taking PPI therapy for 3 years and was recently increased from 20 mg twice daily to 40 mg twice daily.  Previsit Tests   UGI 10/17/2019: Hiatal hernia noted.      EGD 12/17/2018: Impacted food bolus. Non obstructing ring at the GE junction.       CT Chest 4/26/2018     Pathology from esophageal biopsies 10/22   Biopsies from distal esophagus shows no evidence of malignancy or eosinophilic esophagitis.      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     Chronic sinusitis 00/2007    Diagnosed with chronic pansinusitis 2016     Chronic tonsillitis 1980    Had tonsils removed 02/1981, rheumatic fever     Tom-Danlos disease      Eosinophilic esophagitis 08/2018     Gastroesophageal reflux disease 3/1/2017    I have large hiatal hernia     Hernia, abdominal 10/2016    Hiatal Hernia     Hiatal hernia 2016     Have adeline hiatel hernia     Hoarseness Unsure    It comes and goes     IBS (irritable bowel syndrome) with constipation     improved on Linzess     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     Migraines 1/1/2007    Unsure of exact date     Nasal polyps 2013    Were revoved 03/2017, benign     Other nervous system complications 1/2014    Autonomic nervous system disorder, neuralgia, neuropathy     Pneumonia Unsure    Have had pneumonia several times     Swelling, mass, or lump in head and neck 08/2016    Lymph node has been growing for last year     Thyroid disease 01/01/2008        PSH:  Past Surgical History:   Procedure Laterality Date     ADENOIDECTOMY  1981     AS REPAIR OF NASAL SEPTUM       BACK SURGERY  12/09/2013  10/01/2014    Spinal fusion L4-S1, L5 moving 5/8ths in. Remove screws/rods     BIOPSY  01/01/2008 & 3/2017    mole biopsy, lymph node biopsy     COLONOSCOPY  3/2017    Colonoscopy and GI     ESOPHAGOSCOPY, GASTROSCOPY, DUODENOSCOPY (EGD), COMBINED N/A 8/3/2018    Procedure: COMBINED ESOPHAGOSCOPY, GASTROSCOPY, DUODENOSCOPY (EGD), BIOPSY SINGLE OR MULTIPLE;;  Surgeon: Juan Woodson MD;  Location:  GI     ESOPHAGOSCOPY, GASTROSCOPY, DUODENOSCOPY (EGD), COMBINED N/A 10/22/2018    Procedure: COMBINED ESOPHAGOSCOPY, GASTROSCOPY, DUODENOSCOPY (EGD), BIOPSY SINGLE OR MULTIPLE;  Surgeon: Sadia Carolina MD;  Location:  GI     ESOPHAGOSCOPY, GASTROSCOPY, DUODENOSCOPY (EGD), COMBINED N/A 12/17/2018    Procedure: COMBINED ESOPHAGOSCOPY, GASTROSCOPY, DUODENOSCOPY (EGD);  Surgeon: Juan Woodson MD;  Location:  GI     NASAL/SINUS POLYPECTOMY  2017    Polyps were benign     TONSILLECTOMY       TONSILLECTOMY  1981     TUBAL LIGATION          ETOH Occasional   TOB Former smoker. Quit 5 years ago. On and off for ~20 years.      Physical examination  BMI 35.3    From a personal perspective, lives in [place], [place] with her  and two kids. She is on disability. Patient  reports that she is not very active due to her dysautonomia.     IMPRESSION (K21.9) Gastroesophageal reflux disease, esophagitis presence not specified  (primary encounter diagnosis)  .   48 year old year-old female with hiatal hernia and reflux.     PLAN  I spent a total of 30 minutes with Ms. Welch , more than 50% of which were spent in counseling, coordination of care, and face-to-face time. I reviewed the plan as follows:  I had a long discussion with the patient regarding symptoms, which seemed to be multifactorial.  We discussed repairing her hiatal will probably help some of her symptoms, but may not fix everything.  I discussed with the patient that she might need to be on long-term diet restriction because of her dysmotility issues.also recommended weight loss prior to surgery.  Ada is planning to follow-up with medical weight loss.  Procedure planned: Laparoscopic hiatal hernia repair with possible gastrostomy tube without fundoplication.  Consent: pending  Necessary Preop Tests & Appointments: Discussed with the patient that we will touch base with Dr. Ruvalcaba gastroenterologist to make sure her esophagitis and her other issues have been addressed prior to proceeding with surgery.  I will see her in clinic again once the decision for surgery has been made  All questions were answered and Ada Welch and present family were in agreement with the plan.  I appreciate the opportunity to participate in the care of your patient and will keep you updated.  Sincerely,

## 2019-01-16 NOTE — TELEPHONE ENCOUNTER
Spoke with pt regarding your referral and she declined . states that she will be going to pain clinic instead.    Tiara WASSERMAN

## 2019-01-17 ENCOUNTER — OFFICE VISIT (OUTPATIENT)
Dept: SURGERY | Facility: CLINIC | Age: 49
End: 2019-01-17
Attending: STUDENT IN AN ORGANIZED HEALTH CARE EDUCATION/TRAINING PROGRAM
Payer: MEDICARE

## 2019-01-17 ENCOUNTER — ANCILLARY PROCEDURE (OUTPATIENT)
Dept: GENERAL RADIOLOGY | Facility: CLINIC | Age: 49
End: 2019-01-17
Payer: COMMERCIAL

## 2019-01-17 ENCOUNTER — PRE VISIT (OUTPATIENT)
Dept: SURGERY | Facility: CLINIC | Age: 49
End: 2019-01-17

## 2019-01-17 VITALS
SYSTOLIC BLOOD PRESSURE: 126 MMHG | TEMPERATURE: 98.1 F | WEIGHT: 225.5 LBS | DIASTOLIC BLOOD PRESSURE: 87 MMHG | OXYGEN SATURATION: 97 % | BODY MASS INDEX: 35.32 KG/M2 | HEART RATE: 55 BPM

## 2019-01-17 DIAGNOSIS — K21.9 GASTROESOPHAGEAL REFLUX DISEASE, ESOPHAGITIS PRESENCE NOT SPECIFIED: Primary | ICD-10-CM

## 2019-01-17 DIAGNOSIS — K44.9 HIATAL HERNIA: ICD-10-CM

## 2019-01-17 PROCEDURE — 40000114 ZZH STATISTIC NO CHARGE CLINIC VISIT

## 2019-01-17 ASSESSMENT — PAIN SCALES - GENERAL: PAINLEVEL: NO PAIN (0)

## 2019-01-17 NOTE — LETTER
1/17/2019       RE: Ada Welch  [address]  [place] [address]     Dear Colleague,    Thank you for referring your patient, Ada Welch, to the [facility]. Please see a copy of my visit note below.    THORACIC SURGERY - NEW PATIENT OFFICE VISIT      Dear Dr. Rivera, Dr. Ruvalcaba,    I saw Ms. Welch at Dr. Rivera  request in consultation for the evaluation and treatment of a hiatal hernia and dysphagia.     HPI  Ms. Ada Welch is a 48 year old year-old female with history of Tom Danlos syndrome, eosinophilic esophagitis, GERD, nasal polyps, IBS, chronic migraines, chronic pain syndrome who presents with issues with dysphagia and reflux. The patient has long standing history of refulx disease and is on omeprazole 40 mg BID. She presented to the ED multiple  with impacted fluid  She underwent an EGD that showed an impacted fluid bolus and a hiatal hernia.  Ada reports that her symptoms have been going on for years.  Although her reflux symptoms have improved since she started omeprazole, her dysphagia has not gotten better.  She has been taking PPI therapy for 3 years and was recently increased from 20 mg twice daily to 40 mg twice daily.  Previsit Tests   UGI 10/17/2019: Hiatal hernia noted.      EGD 12/17/2018: Impacted food bolus. Non obstructing ring at the GE junction.       CT Chest 4/26/2018     Pathology from esophageal biopsies 10/22   Biopsies from distal esophagus shows no evidence of malignancy or eosinophilic esophagitis.      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     Chronic sinusitis 00/2007    Diagnosed with chronic pansinusitis 2016     Chronic tonsillitis 1980    Had tonsils  removed 02/1981, rheumatic fever     Tom-Danlos disease      Eosinophilic esophagitis 08/2018     Gastroesophageal reflux disease 3/1/2017    I have large hiatal hernia     Hernia, abdominal 10/2016    Hiatal Hernia     Hiatal hernia 2016    Have adeline hiatel hernia     Hoarseness Unsure    It comes and goes     IBS (irritable bowel syndrome) with constipation     improved on Linzess     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     Migraines 1/1/2007    Unsure of exact date     Nasal polyps 2013    Were revoved 03/2017, benign     Other nervous system complications 1/2014    Autonomic nervous system disorder, neuralgia, neuropathy     Pneumonia Unsure    Have had pneumonia several times     Swelling, mass, or lump in head and neck 08/2016    Lymph node has been growing for last year     Thyroid disease 01/01/2008        PSH:  Past Surgical History:   Procedure Laterality Date     ADENOIDECTOMY  1981     AS REPAIR OF NASAL SEPTUM       BACK SURGERY  12/09/2013  10/01/2014    Spinal fusion L4-S1, L5 moving 5/8ths in. Remove screws/rods     BIOPSY  01/01/2008 & 3/2017    mole biopsy, lymph node biopsy     COLONOSCOPY  3/2017    Colonoscopy and GI     ESOPHAGOSCOPY, GASTROSCOPY, DUODENOSCOPY (EGD), COMBINED N/A 8/3/2018    Procedure: COMBINED ESOPHAGOSCOPY, GASTROSCOPY, DUODENOSCOPY (EGD), BIOPSY SINGLE OR MULTIPLE;;  Surgeon: Juan Woodson MD;  Location:  GI     ESOPHAGOSCOPY, GASTROSCOPY, DUODENOSCOPY (EGD), COMBINED N/A 10/22/2018    Procedure: COMBINED ESOPHAGOSCOPY, GASTROSCOPY, DUODENOSCOPY (EGD), BIOPSY SINGLE OR MULTIPLE;  Surgeon: Sadia Carolina MD;  Location:  GI     ESOPHAGOSCOPY, GASTROSCOPY, DUODENOSCOPY (EGD), COMBINED N/A 12/17/2018    Procedure: COMBINED ESOPHAGOSCOPY, GASTROSCOPY, DUODENOSCOPY (EGD);  Surgeon: Juan Woodson MD;  Location:  GI     NASAL/SINUS POLYPECTOMY  2017    Polyps were benign     TONSILLECTOMY       TONSILLECTOMY  1981     TUBAL LIGATION           ETOH Occasional   TOB Former smoker. Quit 5 years ago. On and off for ~20 years.      Physical examination  BMI 35.3    From a personal perspective, lives in [place], [place] with her  and two kids. She is on disability. Patient reports that she is not very active due to her dysautonomia.     IMPRESSION (K21.9) Gastroesophageal reflux disease, esophagitis presence not specified  (primary encounter diagnosis)  .   48 year old year-old female with hiatal hernia and reflux.     PLAN  I spent a total of 30 minutes with Ms. Welch , more than 50% of which were spent in counseling, coordination of care, and face-to-face time. I reviewed the plan as follows:  I had a long discussion with the patient regarding symptoms, which seemed to be multifactorial.  We discussed repairing her hiatal will probably help some of her symptoms, but may not fix everything.  I discussed with the patient that she might need to be on long-term diet restriction because of her dysmotility issues.also recommended weight loss prior to surgery.  Ada is planning to follow-up with medical weight loss.  Procedure planned: Laparoscopic hiatal hernia repair with possible gastrostomy tube without fundoplication.  Consent: pending  Necessary Preop Tests & Appointments: Discussed with the patient that we will touch base with Dr. Ruvalcaba gastroenterologist to make sure her esophagitis and her other issues have been addressed prior to proceeding with surgery.  I will see her in clinic again once the decision for surgery has been made  All questions were answered and Ada Welch and present family were in agreement with the plan.  I appreciate the opportunity to participate in the care of your patient and will keep you updated.  Sincerely,        Alana Mack MD

## 2019-01-22 DIAGNOSIS — G43.709 CHRONIC MIGRAINE WITHOUT AURA WITHOUT STATUS MIGRAINOSUS, NOT INTRACTABLE: ICD-10-CM

## 2019-01-22 DIAGNOSIS — K21.9 GASTROESOPHAGEAL REFLUX DISEASE, ESOPHAGITIS PRESENCE NOT SPECIFIED: Primary | ICD-10-CM

## 2019-01-22 NOTE — TELEPHONE ENCOUNTER
amitriptyline (ELAVIL) 10 MG tablet      Last Written Prescription Date:  Not on med list    ( Elavil 50 mg- listed as historical)  Last Office Visit : 1-10-19  Future Office visit:  2-22-19    Routing refill request to provider for review/approval because:  Drug not active on patient's medication list    The source prescription was discontinued on 7/7/2018 by Tiara Zelaya for the following reason: Medication Reconciliation Clean Up

## 2019-01-24 RX ORDER — AMITRIPTYLINE HYDROCHLORIDE 10 MG/1
TABLET ORAL
Qty: 270 TABLET | Refills: 3 | Status: SHIPPED | OUTPATIENT
Start: 2019-01-24 | End: 2019-09-03

## 2019-02-02 ENCOUNTER — TELEPHONE (OUTPATIENT)
Dept: GASTROENTEROLOGY | Facility: CLINIC | Age: 49
End: 2019-02-02

## 2019-02-02 NOTE — TELEPHONE ENCOUNTER
Patient scheduled for Esophageal Manometry    Indication for procedure. H IMPEDANCE PH 12 HR [[id number]]     Electronically signed by: Lovely Bland APRN CNS on 01/22/19 1426 Status: Completed   Ordering user: Lovely Bland APRN CNS 01/22/19 1426   Order History   Outpatient   Date/Time Action Taken User Additional Information   01/22/19 1426 Sign Lovely Bland APRN CNS    01/22/19 1426 Complete Lovely Bland APRN CNS    Associated Diagnoses     Gastroesophageal reflux disease, esophagitis presence not specified [K21.9]  - Primary         Will clarify Order with ordering md      Referring Provider. Dr. Nancy Mack    ? no    Arrival time verified? 9:30 am on February 6th     Facility location verified? [address] [facility] [address]; 1st floor    Instructions given regarding prep and procedure    Prep Type npo 4 hours prior to exam    Are you taking any anticoagulants or blood thinners? no    Instructions given? yes

## 2019-02-05 ENCOUNTER — MYC REFILL (OUTPATIENT)
Dept: INTERNAL MEDICINE | Facility: CLINIC | Age: 49
End: 2019-02-05

## 2019-02-05 ENCOUNTER — MYC REFILL (OUTPATIENT)
Dept: ANESTHESIOLOGY | Facility: CLINIC | Age: 49
End: 2019-02-05

## 2019-02-05 DIAGNOSIS — G44.219 EPISODIC TENSION-TYPE HEADACHE, NOT INTRACTABLE: ICD-10-CM

## 2019-02-05 DIAGNOSIS — M96.1 POSTLAMINECTOMY SYNDROME: ICD-10-CM

## 2019-02-05 RX ORDER — BUTALBITAL, ACETAMINOPHEN AND CAFFEINE 50; 325; 40 MG/1; MG/1; MG/1
TABLET ORAL
Qty: 10 TABLET | Refills: 0 | Status: SHIPPED | OUTPATIENT
Start: 2019-02-05 | End: 2019-02-11

## 2019-02-05 RX ORDER — TRAMADOL HYDROCHLORIDE 50 MG/1
50 TABLET ORAL EVERY 6 HOURS PRN
Qty: 60 TABLET | Refills: 1 | Status: CANCELLED | OUTPATIENT
Start: 2019-02-05

## 2019-02-05 NOTE — TELEPHONE ENCOUNTER
Fioricet RX faxed to [facility] in [facility] in [place].    TIFFANIE STONE at 9:26 AM on 2/6/2019.      Reason For Call:        Chief Complaint   Patient presents with     Refill Request                 Medication Name, Dose and Monthly Quantity:   Fioricet/Esgic -40 mg # 10    Diagnosis requiring opiates:   G44.219     Problem List Updated:   Yes     Opioid Agreement On File - [facility] PAIN CONTRACT ID# [id number]:       Last Urine Drug Screen (at least once every 12 months) Date:   02/05/19  Unexpected Results:        [place]  Data Reviewed (at least once every 3 months) Date:     Unexpected Results:         Last Fill Date:    01/10/19    Next Fill Date:        Last Visit with PCP:    01/10/19    Future Visits with PCP:    02/22/2019  Processing:         TIFFANIE STONE at 10:16 AM on 2/5/2019.

## 2019-02-06 ENCOUNTER — TRANSFERRED RECORDS (OUTPATIENT)
Dept: HEALTH INFORMATION MANAGEMENT | Facility: CLINIC | Age: 49
End: 2019-02-06

## 2019-02-06 ENCOUNTER — HOSPITAL ENCOUNTER (OUTPATIENT)
Facility: CLINIC | Age: 49
Discharge: HOME | End: 2019-02-06
Attending: INTERNAL MEDICINE | Admitting: INTERNAL MEDICINE
Payer: COMMERCIAL

## 2019-02-06 PROCEDURE — 91010 ESOPHAGUS MOTILITY STUDY: CPT | Performed by: INTERNAL MEDICINE

## 2019-02-06 RX ORDER — BUTALBITAL, ACETAMINOPHEN AND CAFFEINE 50; 325; 40 MG/1; MG/1; MG/1
TABLET ORAL
Qty: 10 TABLET | Refills: 0 | OUTPATIENT
Start: 2019-02-06

## 2019-02-06 NOTE — OR NURSING
Pt here for manometry for pre nissen workup. Pt has gerd, and a large hernia. Procedure explained and consent given. Manometry catheter placed via R nare with some slight tightness all the way down and some gagging. Unable to pass catheter past 51 cm. NS swallows given per protocol and pt tolerated very well. Catheter then removed. No complaints after procedure. Discharge to home.

## 2019-02-06 NOTE — DISCHARGE INSTRUCTIONS
Motility Discharge Instructions    1. Resume regular diet.  2. You may have a bloody nose or sore throat after the procedure.  3. If you have questions call [phone] from 7:00am-4:30pm.      Ellyn Kim RN

## 2019-02-07 ENCOUNTER — TELEPHONE (OUTPATIENT)
Dept: ANESTHESIOLOGY | Facility: CLINIC | Age: 49
End: 2019-02-07

## 2019-02-07 DIAGNOSIS — M96.1 POSTLAMINECTOMY SYNDROME: ICD-10-CM

## 2019-02-07 DIAGNOSIS — K21.9 GASTROESOPHAGEAL REFLUX DISEASE, ESOPHAGITIS PRESENCE NOT SPECIFIED: Primary | ICD-10-CM

## 2019-02-07 RX ORDER — TRAMADOL HYDROCHLORIDE 50 MG/1
50 TABLET ORAL EVERY 6 HOURS PRN
Qty: 60 TABLET | Refills: 0 | Status: SHIPPED | OUTPATIENT
Start: 2019-02-07 | End: 2019-02-14

## 2019-02-07 NOTE — TELEPHONE ENCOUNTER
Refill request    Medication: traMADol (ULTRAM) 50 MG tablet  Take 1 tablet (50 mg) by mouth every 6 hours as needed for severe pain      MNPMP Checked: Yes    traMADol (ULTRAM) 50 MG tablet - Last refilled 1/9/19 for 60 tablets      Refilled: YES, dated to be refilled 30 days since last refill    Last clinic appointment: 11/8/19  Next clinic appointment: TBD     Patient requested to:      Sent to pharmacy

## 2019-02-11 ENCOUNTER — MYC REFILL (OUTPATIENT)
Dept: INTERNAL MEDICINE | Facility: CLINIC | Age: 49
End: 2019-02-11

## 2019-02-11 DIAGNOSIS — G44.219 EPISODIC TENSION-TYPE HEADACHE, NOT INTRACTABLE: ICD-10-CM

## 2019-02-11 DIAGNOSIS — M96.1 POSTLAMINECTOMY SYNDROME OF LUMBAR REGION: ICD-10-CM

## 2019-02-11 RX ORDER — BUPRENORPHINE 10 UG/H
1 PATCH TRANSDERMAL
Qty: 4 PATCH | Refills: 0 | Status: SHIPPED | OUTPATIENT
Start: 2019-02-11 | End: 2019-02-14

## 2019-02-11 NOTE — TELEPHONE ENCOUNTER
Refill request    Medication: buprenorphine (BUTRANS) 10 MCG/HR WK patch  Place 1 patch onto the skin every 7 days    MNPMP Checked: Yes    buprenorphine (BUTRANS) 10 MCG/HR WK patch - Last refilled 1/9/19 for 4 patches       Refilled: YES, dated to be refilled 30 days since last refill    Last clinic appointment:11/8/18  Next clinic appointment:2/14/19    Patient requested to:      Sent to pharmacy        One refill of butrans sent to pharmacy, no changes.     Jessica De Souza, RN, BSN

## 2019-02-12 RX ORDER — BUTALBITAL, ACETAMINOPHEN AND CAFFEINE 50; 325; 40 MG/1; MG/1; MG/1
TABLET ORAL
Qty: 10 TABLET | Refills: 0 | OUTPATIENT
Start: 2019-02-12

## 2019-02-12 RX ORDER — BUTALBITAL, ACETAMINOPHEN AND CAFFEINE 50; 325; 40 MG/1; MG/1; MG/1
TABLET ORAL
Qty: 10 TABLET | Refills: 0 | Status: SHIPPED | OUTPATIENT
Start: 2019-02-12 | End: 2019-08-12

## 2019-02-14 ENCOUNTER — OFFICE VISIT (OUTPATIENT)
Dept: ANESTHESIOLOGY | Facility: CLINIC | Age: 49
End: 2019-02-14
Payer: COMMERCIAL

## 2019-02-14 VITALS
BODY MASS INDEX: 35.31 KG/M2 | DIASTOLIC BLOOD PRESSURE: 87 MMHG | HEART RATE: 65 BPM | HEIGHT: 67 IN | SYSTOLIC BLOOD PRESSURE: 141 MMHG | OXYGEN SATURATION: 98 % | WEIGHT: 225 LBS

## 2019-02-14 DIAGNOSIS — M96.1 POSTLAMINECTOMY SYNDROME: ICD-10-CM

## 2019-02-14 DIAGNOSIS — M96.1 POSTLAMINECTOMY SYNDROME OF LUMBAR REGION: ICD-10-CM

## 2019-02-14 RX ORDER — TRAMADOL HYDROCHLORIDE 50 MG/1
50 TABLET ORAL EVERY 6 HOURS PRN
Qty: 60 TABLET | Refills: 1 | Status: SHIPPED | OUTPATIENT
Start: 2019-03-09 | End: 2019-06-27

## 2019-02-14 RX ORDER — BUPRENORPHINE 10 UG/H
1 PATCH TRANSDERMAL
Qty: 4 PATCH | Refills: 2 | Status: SHIPPED | OUTPATIENT
Start: 2019-03-09 | End: 2019-06-05

## 2019-02-14 ASSESSMENT — ANXIETY QUESTIONNAIRES
6. BECOMING EASILY ANNOYED OR IRRITABLE: NOT AT ALL
7. FEELING AFRAID AS IF SOMETHING AWFUL MIGHT HAPPEN: NOT AT ALL
1. FEELING NERVOUS, ANXIOUS, OR ON EDGE: NOT AT ALL
4. TROUBLE RELAXING: NOT AT ALL
5. BEING SO RESTLESS THAT IT IS HARD TO SIT STILL: NOT AT ALL
GAD7 TOTAL SCORE: 0
7. FEELING AFRAID AS IF SOMETHING AWFUL MIGHT HAPPEN: NOT AT ALL
3. WORRYING TOO MUCH ABOUT DIFFERENT THINGS: NOT AT ALL
2. NOT BEING ABLE TO STOP OR CONTROL WORRYING: NOT AT ALL
GAD7 TOTAL SCORE: 0

## 2019-02-14 ASSESSMENT — PAIN SCALES - GENERAL: PAINLEVEL: EXTREME PAIN (8)

## 2019-02-14 ASSESSMENT — MIFFLIN-ST. JEOR: SCORE: 1688.22

## 2019-02-14 NOTE — PATIENT INSTRUCTIONS
1. Continue taking tramadol and butrans as prescribed.     Narcotic prescriptions given today:  butrans patches You may refill on 3/9/19, with two additional refills  Tramadol You may refill on 3/9/19, with one additional refill     2. Dr. Martinez will communicate with Dr. Nguyen regarding recommendations.     Follow up: 3-6 months, or sooner if needed         To speak with a nurse, schedule/reschedule/cancel a clinic appointment, or request a medication refill call: [phone]     You can also reach us by [employer]: [email or web address]    For refills, please call on Monday, 1 week before your medication runs out. The doctors are not always in clinic, so this gives us time to get your prescriptions ready.  Please let us know the name of the medication you are requesting a refill of.

## 2019-02-14 NOTE — LETTER
2/14/2019       RE: Ada Welch  [address]  [place] [address]     Dear Colleague,    Thank you for referring your patient, Ada Welch, to the [facility] at [facility]. Please see a copy of my visit note below.        [facility] : Progress Note    Date of visit: 2/14/2019    Interval history:  Ada Welch is a 48 year old female with chronic low back pain. The patient has very complex history of low back pain for ~24 years for which she underwent L4-S1 fusion (TLIF) 12/9/13.  Postoperatively, her back pain worsened (mainly LBP, but also radiated down both lower limbs in an S1 dermatomal distribution).   Underwent removal of posterior instrumentation (screws and guadalupe).  After fusion removal, her pain continued.  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   She even sought care for her EDS by a specialist in [facility] who she saw twice. She was admitted in the hospital 7/6/18 to 7/9/2018 for an intractable migraine.  Prior to the admission, she had on and off severe headaches, photophobia, diplopia and nausea.The patient has been seeing a neurologist Dr. Noemy Nguyen for chronic migraines.  She has tried Aimovig with limited benefit. Patient then switched Emgality.     She has a h/o significant autonomic dysfunction.  Since 2014, she reports that her heart rate has fluctuated from as low as  beats per minutes and her blood pressure also fluctuates at the same time without any aggravating factors.  The fluctuation of the blood pressure and heart rate happens when she is supine, sitting, standing, walking, etc.  She had 2 episodes of blacking out for a few seconds.  She has seen cardiologist Dr. Russell who advised her to continue fludrocortisone, isometric exercise, low carbohydrate and low gluten diet.     She has been seeing  "Dr. Singh for urinary urgency and incontinence.  She is advised for improving lifestyle, dietary modification, optimizing bowel regimen and to start pelvic physical therapy.     She has been managing her pain with stable dose of Butrans 10 mcg per hour q 7 days, tramadol 50 mg every 6 hours as needed which she refills every 2-3 months.  In addition she takes tizanidine 4 mg 4 times daily as needed.  She takes sumatriptan for breakthrough migraine.  Occasionally she takes oxycodone 5 mg when her pain gets significantly worse.      Her medication regimen is unchanged. She states that Butrans \"helps her to walk.\" Fiorcet only helps for a few hrs. She has #8 tabs left.    Patient asked about cervical ablation as suggested by her neurologist. She has had a constant HA at the midline/base of skull which can radiate anteriorly along the jaw line and cause numbness of the face from the jaw line up to the eyes. Worse with sitting up straight.    Patient has not reacted well to injections such as occipital nerve blocks in the past.     Just refilled Butrans rx. Has 3 more patches at home. She has ~50 tabs left of tramadol. It can range from 0-4 tabs daily. Duloxetine is being continued at 60 mg in AM and 30 mg in PM.    [place] Prescription Monitoring Program:   Reviewed. No concerns     Review of Systems:  The 14 system ROS was reviewed and was negative except what is documented above and as follows.  Any bowel or bladder problems: no  Mood: stable    Physical Exam:  Vitals:    02/14/19 1200   BP: 141/87   Pulse: 65   SpO2: 98%   Weight: 102.1 kg (225 lb)   Height: 1.702 m (5' 7\")       General: Awake, no apparent distress.   Eyes: Sclerae are anicteric, EOMI   Lungs: unlabored  Extremities: no peripheral edema  Musculoskeletal: Wearing cervical brace. After removing brace, exam done. Straightening or cervical lordosis. Generalized, non focal tenderness with light palpation along upper thoracic spine, cervical spine, " trapezius, cervical paraspinals, superior nuchal ridge.   Psychiatric; Normal affect  Skin: Warm and Dry    Medications:  Current Outpatient Medications   Medication Sig Dispense Refill     [START ON 3/9/2019] buprenorphine (BUTRANS) 10 MCG/HR WK patch Place 1 patch onto the skin every 7 days 4 patch 2     [START ON 3/9/2019] traMADol (ULTRAM) 50 MG tablet Take 1 tablet (50 mg) by mouth every 6 hours as needed for severe pain 60 tablet 1     ACETAMINOPHEN PO Take 1,000 mg by mouth every 6 hours as needed for pain Take with tramadol       albuterol (PROAIR HFA/PROVENTIL HFA/VENTOLIN HFA) 108 (90 BASE) MCG/ACT Inhaler Inhale 2 puffs into the lungs every 6 hours as needed        amitriptyline (ELAVIL) 10 MG tablet TAKE 3 TABLETS BY MOUTH AT BEDTIME 270 tablet 3     AMITRIPTYLINE HCL PO Take 50 mg by mouth At Bedtime        butalbital-acetaminophen-caffeine (FIORICET/ESGIC) -40 MG tablet TAKE ONE TABLET BY MOUTH EVERY 4 HOURS AS NEEDED. MAX 10 PER MONTH. 10 tablet 0     cetirizine (ZYRTEC) 10 MG tablet Take 10 mg by mouth daily        COMPRESSION STOCKINGS 1 each daily 20-30 mmHg Thigh Length measure and fit, color and style per patient preference. Doff juanjose garcia per need. 3 each 3     DULoxetine (CYMBALTA) 30 MG capsule Take 60mg in the morning and 30mg at bedtime 270 capsule 1     eletriptan (RELPAX) 20 MG tablet Take 1 tablet (20 mg) by mouth at onset of headache for migraine May repeat in 2 hours. Max 4 tablets/24 hours. 18 tablet 1     EPINEPHrine (EPIPEN/ADRENACLICK/OR ANY BX GENERIC EQUIV) 0.3 MG/0.3ML injection 2-pack Inject 0.3 mg into the muscle       famotidine (PEPCID) 20 MG tablet Take 1 tablet (20 mg) by mouth At Bedtime 90 tablet 1     fluticasone (FLONASE) 50 MCG/ACT spray INSTILL 2 SPRAYS INTO BOTH NOSTRILS DAILY 48 mL 3     fluticasone-salmeterol (ADVAIR DISKUS) 250-50 MCG/DOSE diskus inhaler Inhale 1 puff into the lungs 2 times daily as needed        levothyroxine (SYNTHROID) 25 MCG tablet Take 1  tablet (25 mcg) by mouth daily 90 tablet 3     linaclotide (LINZESS) 145 MCG capsule Take 1 capsule (145 mcg) by mouth every morning (before breakfast) Last refill until GI clinic follow up. 90 capsule 2     medical cannabis (Patient's own supply.  Not a prescription) Take 1 Dose by mouth See Admin Instructions (This is NOT a prescription, and does not certify that the patient has a qualifying medical condition for medical cannabis.  The purpose of this order is  to document that the patient reports taking medical cannabis.)       metoclopramide (REGLAN) 10 MG tablet Take 1 tablet (10 mg) by mouth 4 times daily (before meals and nightly) 40 tablet 3     Multiple Vitamins-Minerals (MULTIVITAMIN PO) Take 1 tablet by mouth daily        Nerve Stimulator (CEFALY KIT) KAROLINE 1 Device daily as needed 1 Device 0     omeprazole (PRILOSEC) 40 MG capsule Take 1 capsule (40 mg) by mouth 2 times daily as needed (prn) 180 capsule 2     ondansetron (ZOFRAN) 4 MG tablet Take 4 mg by mouth       ondansetron (ZOFRAN-ODT) 4 MG ODT tab Take 1-2 tablets (4-8 mg) by mouth every 12 hours as needed for nausea 60 tablet 1     oxyCODONE IR (ROXICODONE) 5 MG tablet Take 0.5 tablets (2.5 mg) by mouth every 6 hours as needed for severe pain 15 tablet 0     Probiotic Product (PROBIOTIC DAILY PO) Take 2 packets by mouth every morning        propranolol ER (INDERAL LA) 120 MG 24 hr capsule Take 120 mg by mouth daily  11     Propranolol HCl SR Beads 120 MG CP24 Take 120 mg by mouth daily 30 capsule 11     rizatriptan (MAXALT-MLT) 5 MG ODT TAKE 1-2 TABLETS (5-10 MG) BY MOUTH AT ONSET OF HEADACHE FOR MIGRAINE (MAX 30 MG IN 24 HOURS) 9 tablet 11     senna (SENOKOT) 8.6 MG tablet Take 17.2-34.4 mg by mouth       SUMAtriptan (IMITREX STATDOSE) 6 MG/0.5ML pen injector kit Inject 0.5 ml (6 mg) subcutaneously at onset of migraine, May repeat in 1 hour , Max 12 mg/24 hrs 2 kit 1     tiZANidine (ZANAFLEX) 4 MG tablet Take 1 tablet (4 mg) by mouth 4 times daily  as needed for muscle spasms 360 tablet 1     vitamin B complex with vitamin C (VITAMIN  B COMPLEX) TABS tablet Take 1 tablet by mouth daily       VITAMIN D, CHOLECALCIFEROL, PO Take 2,000 Units by mouth daily       ZONISAMIDE PO Take 75 mg by mouth daily         Analgesic Medications:   Medications related to Pain Management (From now, onward)    None             LABORATORY VALUES:   Recent Labs   Lab Test 12/17/18  0920 07/24/18  0648    139   POTASSIUM 4.2 3.8   CHLORIDE 107 109   CO2 21 23   ANIONGAP 10 7   * 97   BUN 7 9   CR 0.71 0.85   JUAN 8.9 7.8*       CBC RESULTS:   Recent Labs   Lab Test 12/17/18  0920   WBC 10.4   RBC 5.00   HGB 14.0   HCT 43.5   MCV 87   MCH 28.0   MCHC 32.2   RDW 14.0          Most Recent 3 INR's:  Recent Labs   Lab Test 12/17/18  0920 07/06/18  2229 07/25/17  1249   INR 0.90 0.96 1.01     C spine xrays 11/30/17:  Impression:  1. Degenerative changes in the cervical spine most pronounced C4-C7.  2. 3 mm of anterolisthesis of C3 on C4 and C4 on C5 in flexion which  corrects in extension.  MRI C spine 7/24/17:  Findings:  Mild loss of cervical lordosis with trace anterolisthesis of C4 over  C5.  There is mild to moderate disc height narrowing most prominent at  C5-C7.  There is normal signal within and normal contour of the  cervical spinal cord. Unchanged focus of T2 hyperintensity at the  level of C4-C5, most consistent with a mildly focally dilated central  canal. There is no abnormal contrast enhancement within the cervical  spinal cord, thecal sac or vertebral column.  The findings on a level  by level basis are as follows:     C2-3: Preserved disc height and normal increased T2 signal of the  disc. No spinal canal or neural foraminal narrowing.  C3-4: Preserved disc height is normal increased T2 signal of the disc.  No spinal canal or neural foraminal narrowing.  C4-5:  Mild loss of disc height. Preserved increased T2 signal of the  disc. Tiny broad-based disc  bulge. Mild bilateral neuroforaminal  narrowing. No spinal canal narrowing   C5-6:  Moderate loss of disc height is some loss of increased T2  signal of the disc. Small broad-based disc bulge. Moderate right  neuroforaminal narrowing. Spinal canal is preserved.  C6-7:  Moderate loss of disc height loss of increased T2 signal in  this. Small broad-based disc bulge. Preserved spinal canal. Mild right  neuroforaminal narrowing.  C7-T1:  No spinal canal or neural foraminal narrowing. Preserved disc  height no increased T2 signal of the disc.  No abnormality of the paraspinous soft tissues.                                                          Impression:   1. No abnormal enhancement in the spinal cord, thecal sac or cervical  vertebrae.  2. Unchanged mild to moderate degenerative changes, most prominent  C4-C7. Moderate right neuroforaminal narrowing at C5-C6.       ASSESSMENT:     Chronic pain syndrome  Failed back surgery syndrome  Intractable migraine  Tom-Danlos syndrome  Dysautonomia  Gastroparesis  Urinary urgency and incontinence  Chronic, continuous use of opioid  Intractable chronic migraine without aura and without status migrainosus        PLAN:     1. Medications.   -Continue transdermal Butrans 10 mcg per hour q 7 days. She has #3 patches remaining at home. 3 refills provided today, with post dated fill date 3/9/19.  -Continue propranolol 120 mg p.o. daily  -Continued Zanaflex 4 mg 4 times daily as needed  -Continue tramadol 50 mg p.o. every 6 hours as needed.  Post dated prescription of 60 tablets given today with fill date 3/7/19 and 1 refill.  -Amitriptyline 50 mg nightly  -Cymbalta continued 60 mg AM and 30 mg PM  -Maxalt 5-10 mg p.o. at onset of the headache for migraine (max 30 mg in 24 hours)  -Has #8 tabs Fioricet left       2. Interventional procedures:  -The patient is not an appropriate candidate for interventional procedures. She has not reacted well at all from past injections such as  occipital nerve blocks. Ablation is far more involved than occipital nerve blocks and would most certainly cause her problems.     3. Labs and imaging:   -None needed for pain management.      4. Rehab:    -Not discussed today.    5. Psychology:   -No current needs.      6. Disposition:  The patient will follow up in our outpatient clinic in 12 weeks or earlier if clinically indicated.        Assessment will be ongoing with changes in treatment as indicated.  Benefits/risks/alternatives to treatment have been reviewed and the patient has been instructed to contact this office if they have any questions or concerns.  This plan of care has been discussed with the patient and the patient is in agreement.      Ryder Burks DO  Pain Medicine Fellow    Physician Attestation   I saw and evaluated Ada Welch.  I agree with above history, review of systems, physical exam and plan.  I have reviewed the content of the documentation and have edited it as needed. I have personally performed the services documented here and the documentation accurately represents those services and the decisions I have made.       Again, thank you for allowing me to participate in the care of your patient.      Sincerely,    Faiza Martinez MD

## 2019-02-14 NOTE — PROGRESS NOTES
[facility] : Progress Note    Date of visit: 2/14/2019    Interval history:  Ada Welch is a 48 year old female with chronic low back pain. The patient has very complex history of low back pain for ~24 years for which she underwent L4-S1 fusion (TLIF) 12/9/13.  Postoperatively, her back pain worsened (mainly LBP, but also radiated down both lower limbs in an S1 dermatomal distribution).   Underwent removal of posterior instrumentation (screws and guadalupe).  After fusion removal, her pain continued.  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  She even sought care for her EDS by a specialist in [facility] who she saw twice. She was admitted in the hospital 7/6/18 to 7/9/2018 for an intractable migraine.  Prior to the admission, she had on and off severe headaches, photophobia, diplopia and nausea.The patient has been seeing a neurologist Dr. Noemy Nguyen for chronic migraines.  She has tried Aimovig with limited benefit. Patient then switched Emgality.     She has a h/o significant autonomic dysfunction.  Since 2014, she reports that her heart rate has fluctuated from as low as  beats per minutes and her blood pressure also fluctuates at the same time without any aggravating factors.  The fluctuation of the blood pressure and heart rate happens when she is supine, sitting, standing, walking, etc.  She had 2 episodes of blacking out for a few seconds.  She has seen cardiologist Dr. Russell who advised her to continue fludrocortisone, isometric exercise, low carbohydrate and low gluten diet.     She has been seeing Dr. Singh for urinary urgency and incontinence.  She is advised for improving lifestyle, dietary modification, optimizing bowel regimen and to start pelvic physical therapy.     She has been managing her pain with stable dose of Butrans 10 mcg per hour q 7 days, tramadol 50 mg every 6 hours as needed which she  "refills every 2-3 months.  In addition she takes tizanidine 4 mg 4 times daily as needed.  She takes sumatriptan for breakthrough migraine.  Occasionally she takes oxycodone 5 mg when her pain gets significantly worse.      Her medication regimen is unchanged. She states that Butrans \"helps her to walk.\" Fiorcet only helps for a few hrs. She has #8 tabs left.    Patient asked about cervical ablation as suggested by her neurologist. She has had a constant HA at the midline/base of skull which can radiate anteriorly along the jaw line and cause numbness of the face from the jaw line up to the eyes. Worse with sitting up straight.    Patient has not reacted well to injections such as occipital nerve blocks in the past.     Just refilled Butrans rx. Has 3 more patches at home. She has ~50 tabs left of tramadol. It can range from 0-4 tabs daily. Duloxetine is being continued at 60 mg in AM and 30 mg in PM.    [place] Prescription Monitoring Program:   Reviewed. No concerns     Review of Systems:  The 14 system ROS was reviewed and was negative except what is documented above and as follows.  Any bowel or bladder problems: no  Mood: stable    Physical Exam:  Vitals:    02/14/19 1200   BP: 141/87   Pulse: 65   SpO2: 98%   Weight: 102.1 kg (225 lb)   Height: 1.702 m (5' 7\")       General: Awake, no apparent distress.   Eyes: Sclerae are anicteric, EOMI   Lungs: unlabored  Extremities: no peripheral edema  Musculoskeletal: Wearing cervical brace. After removing brace, exam done. Straightening or cervical lordosis. Generalized, non focal tenderness with light palpation along upper thoracic spine, cervical spine, trapezius, cervical paraspinals, superior nuchal ridge.   Psychiatric; Normal affect  Skin: Warm and Dry    Medications:  Current Outpatient Medications   Medication Sig Dispense Refill     [START ON 3/9/2019] buprenorphine (BUTRANS) 10 MCG/HR WK patch Place 1 patch onto the skin every 7 days 4 patch 2     [START ON " 3/9/2019] traMADol (ULTRAM) 50 MG tablet Take 1 tablet (50 mg) by mouth every 6 hours as needed for severe pain 60 tablet 1     ACETAMINOPHEN PO Take 1,000 mg by mouth every 6 hours as needed for pain Take with tramadol       albuterol (PROAIR HFA/PROVENTIL HFA/VENTOLIN HFA) 108 (90 BASE) MCG/ACT Inhaler Inhale 2 puffs into the lungs every 6 hours as needed        amitriptyline (ELAVIL) 10 MG tablet TAKE 3 TABLETS BY MOUTH AT BEDTIME 270 tablet 3     AMITRIPTYLINE HCL PO Take 50 mg by mouth At Bedtime        butalbital-acetaminophen-caffeine (FIORICET/ESGIC) -40 MG tablet TAKE ONE TABLET BY MOUTH EVERY 4 HOURS AS NEEDED. MAX 10 PER MONTH. 10 tablet 0     cetirizine (ZYRTEC) 10 MG tablet Take 10 mg by mouth daily        COMPRESSION STOCKINGS 1 each daily 20-30 mmHg Thigh Length measure and fit, color and style per patient preference. Doshar n jose per need. 3 each 3     DULoxetine (CYMBALTA) 30 MG capsule Take 60mg in the morning and 30mg at bedtime 270 capsule 1     eletriptan (RELPAX) 20 MG tablet Take 1 tablet (20 mg) by mouth at onset of headache for migraine May repeat in 2 hours. Max 4 tablets/24 hours. 18 tablet 1     EPINEPHrine (EPIPEN/ADRENACLICK/OR ANY BX GENERIC EQUIV) 0.3 MG/0.3ML injection 2-pack Inject 0.3 mg into the muscle       famotidine (PEPCID) 20 MG tablet Take 1 tablet (20 mg) by mouth At Bedtime 90 tablet 1     fluticasone (FLONASE) 50 MCG/ACT spray INSTILL 2 SPRAYS INTO BOTH NOSTRILS DAILY 48 mL 3     fluticasone-salmeterol (ADVAIR DISKUS) 250-50 MCG/DOSE diskus inhaler Inhale 1 puff into the lungs 2 times daily as needed        levothyroxine (SYNTHROID) 25 MCG tablet Take 1 tablet (25 mcg) by mouth daily 90 tablet 3     linaclotide (LINZESS) 145 MCG capsule Take 1 capsule (145 mcg) by mouth every morning (before breakfast) Last refill until GI clinic follow up. 90 capsule 2     medical cannabis (Patient's own supply.  Not a prescription) Take 1 Dose by mouth See Admin Instructions  (This is NOT a prescription, and does not certify that the patient has a qualifying medical condition for medical cannabis.  The purpose of this order is  to document that the patient reports taking medical cannabis.)       metoclopramide (REGLAN) 10 MG tablet Take 1 tablet (10 mg) by mouth 4 times daily (before meals and nightly) 40 tablet 3     Multiple Vitamins-Minerals (MULTIVITAMIN PO) Take 1 tablet by mouth daily        Nerve Stimulator (CEFALY KIT) KAROLINE 1 Device daily as needed 1 Device 0     omeprazole (PRILOSEC) 40 MG capsule Take 1 capsule (40 mg) by mouth 2 times daily as needed (prn) 180 capsule 2     ondansetron (ZOFRAN) 4 MG tablet Take 4 mg by mouth       ondansetron (ZOFRAN-ODT) 4 MG ODT tab Take 1-2 tablets (4-8 mg) by mouth every 12 hours as needed for nausea 60 tablet 1     oxyCODONE IR (ROXICODONE) 5 MG tablet Take 0.5 tablets (2.5 mg) by mouth every 6 hours as needed for severe pain 15 tablet 0     Probiotic Product (PROBIOTIC DAILY PO) Take 2 packets by mouth every morning        propranolol ER (INDERAL LA) 120 MG 24 hr capsule Take 120 mg by mouth daily  11     Propranolol HCl SR Beads 120 MG CP24 Take 120 mg by mouth daily 30 capsule 11     rizatriptan (MAXALT-MLT) 5 MG ODT TAKE 1-2 TABLETS (5-10 MG) BY MOUTH AT ONSET OF HEADACHE FOR MIGRAINE (MAX 30 MG IN 24 HOURS) 9 tablet 11     senna (SENOKOT) 8.6 MG tablet Take 17.2-34.4 mg by mouth       SUMAtriptan (IMITREX STATDOSE) 6 MG/0.5ML pen injector kit Inject 0.5 ml (6 mg) subcutaneously at onset of migraine, May repeat in 1 hour , Max 12 mg/24 hrs 2 kit 1     tiZANidine (ZANAFLEX) 4 MG tablet Take 1 tablet (4 mg) by mouth 4 times daily as needed for muscle spasms 360 tablet 1     vitamin B complex with vitamin C (VITAMIN  B COMPLEX) TABS tablet Take 1 tablet by mouth daily       VITAMIN D, CHOLECALCIFEROL, PO Take 2,000 Units by mouth daily       ZONISAMIDE PO Take 75 mg by mouth daily         Analgesic Medications:   Medications related to  Pain Management (From now, onward)    None             LABORATORY VALUES:   Recent Labs   Lab Test 12/17/18  0920 07/24/18  0648    139   POTASSIUM 4.2 3.8   CHLORIDE 107 109   CO2 21 23   ANIONGAP 10 7   * 97   BUN 7 9   CR 0.71 0.85   JUAN 8.9 7.8*       CBC RESULTS:   Recent Labs   Lab Test 12/17/18  0920   WBC 10.4   RBC 5.00   HGB 14.0   HCT 43.5   MCV 87   MCH 28.0   MCHC 32.2   RDW 14.0          Most Recent 3 INR's:  Recent Labs   Lab Test 12/17/18  0920 07/06/18  2229 07/25/17  1249   INR 0.90 0.96 1.01     C spine xrays 11/30/17:  Impression:  1. Degenerative changes in the cervical spine most pronounced C4-C7.  2. 3 mm of anterolisthesis of C3 on C4 and C4 on C5 in flexion which  corrects in extension.  MRI C spine 7/24/17:  Findings:  Mild loss of cervical lordosis with trace anterolisthesis of C4 over  C5.  There is mild to moderate disc height narrowing most prominent at  C5-C7.  There is normal signal within and normal contour of the  cervical spinal cord. Unchanged focus of T2 hyperintensity at the  level of C4-C5, most consistent with a mildly focally dilated central  canal. There is no abnormal contrast enhancement within the cervical  spinal cord, thecal sac or vertebral column.  The findings on a level  by level basis are as follows:     C2-3: Preserved disc height and normal increased T2 signal of the  disc. No spinal canal or neural foraminal narrowing.  C3-4: Preserved disc height is normal increased T2 signal of the disc.  No spinal canal or neural foraminal narrowing.  C4-5:  Mild loss of disc height. Preserved increased T2 signal of the  disc. Tiny broad-based disc bulge. Mild bilateral neuroforaminal  narrowing. No spinal canal narrowing   C5-6:  Moderate loss of disc height is some loss of increased T2  signal of the disc. Small broad-based disc bulge. Moderate right  neuroforaminal narrowing. Spinal canal is preserved.  C6-7:  Moderate loss of disc height loss of  increased T2 signal in  this. Small broad-based disc bulge. Preserved spinal canal. Mild right  neuroforaminal narrowing.  C7-T1:  No spinal canal or neural foraminal narrowing. Preserved disc  height no increased T2 signal of the disc.  No abnormality of the paraspinous soft tissues.                                                          Impression:   1. No abnormal enhancement in the spinal cord, thecal sac or cervical  vertebrae.  2. Unchanged mild to moderate degenerative changes, most prominent  C4-C7. Moderate right neuroforaminal narrowing at C5-C6.       ASSESSMENT:     Chronic pain syndrome  Failed back surgery syndrome  Intractable migraine  Tom-Danlos syndrome  Dysautonomia  Gastroparesis  Urinary urgency and incontinence  Chronic, continuous use of opioid  Intractable chronic migraine without aura and without status migrainosus        PLAN:     1. Medications.   -Continue transdermal Butrans 10 mcg per hour q 7 days. She has #3 patches remaining at home. 3 refills provided today, with post dated fill date 3/9/19.  -Continue propranolol 120 mg p.o. daily  -Continued Zanaflex 4 mg 4 times daily as needed  -Continue tramadol 50 mg p.o. every 6 hours as needed.  Post dated prescription of 60 tablets given today with fill date 3/7/19 and 1 refill.  -Amitriptyline 50 mg nightly  -Cymbalta continued 60 mg AM and 30 mg PM  -Maxalt 5-10 mg p.o. at onset of the headache for migraine (max 30 mg in 24 hours)  -Has #8 tabs Fioricet left       2. Interventional procedures:  -The patient is not an appropriate candidate for interventional procedures. She has not reacted well at all from past injections such as occipital nerve blocks. Ablation is far more involved than occipital nerve blocks and would most certainly cause her problems.     3. Labs and imaging:   -None needed for pain management.      4. Rehab:    -Not discussed today.    5. Psychology:   -No current needs.      6. Disposition:  The patient will  follow up in our outpatient clinic in 12 weeks or earlier if clinically indicated.        Assessment will be ongoing with changes in treatment as indicated.  Benefits/risks/alternatives to treatment have been reviewed and the patient has been instructed to contact this office if they have any questions or concerns.  This plan of care has been discussed with the patient and the patient is in agreement.      Ryder Burks DO  Pain Medicine Fellow    Physician Attestation   I saw and evaluated Ada Welch.  I agree with above history, review of systems, physical exam and plan.  I have reviewed the content of the documentation and have edited it as needed. I have personally performed the services documented here and the documentation accurately represents those services and the decisions I have made.     Faiza Martinez MD, PhD    [facility]  [address]  [address] [facility] [address] [phone]               Answers for HPI/ROS submitted by the patient on 2/14/2019   SAMANTHA 7 TOTAL SCORE: 0

## 2019-02-15 ASSESSMENT — ANXIETY QUESTIONNAIRES: GAD7 TOTAL SCORE: 0

## 2019-02-24 DIAGNOSIS — G44.219 EPISODIC TENSION-TYPE HEADACHE, NOT INTRACTABLE: ICD-10-CM

## 2019-02-25 NOTE — TELEPHONE ENCOUNTER
[facility] Call Center    Phone Message    May a detailed message be left on voicemail: yes    Reason for Call: Medication Question or concern regarding medication   Prescription Clarification  Name of Medication: Tramadol  Prescribing Provider: Faiza Martinez   Pharmacy: [facility] [id number] [address] (Pharmacy)   What on the order needs clarification? Pharmacy calling. Pt would like to fill order early. Please call to confirm or deny to pharmacy          Action Taken: Message routed to:  [facility] ([facility]): Pain

## 2019-02-26 NOTE — TELEPHONE ENCOUNTER
[facility] updated Dr. Martinez of pt's request.  Last refill 2/7/19.  Dr. Martinez agreeable to one time early refill. Pharmacy updated.  Pt updated via my chart that early refills will not be authorized in the future, per pain contract agreement.     Jessica De Souza, RN, BSN

## 2019-02-28 ENCOUNTER — HOSPITAL ENCOUNTER (OUTPATIENT)
Facility: CLINIC | Age: 49
Discharge: HOME | End: 2019-02-28
Attending: RADIOLOGY | Admitting: RADIOLOGY
Payer: COMMERCIAL

## 2019-02-28 ENCOUNTER — HOSPITAL ENCOUNTER (OUTPATIENT)
Dept: GENERAL RADIOLOGY | Facility: CLINIC | Age: 49
End: 2019-02-28
Attending: PSYCHIATRY & NEUROLOGY | Admitting: RADIOLOGY
Payer: COMMERCIAL

## 2019-02-28 ENCOUNTER — APPOINTMENT (OUTPATIENT)
Dept: MEDSURG UNIT | Facility: CLINIC | Age: 49
End: 2019-02-28
Attending: RADIOLOGY
Payer: COMMERCIAL

## 2019-02-28 VITALS
SYSTOLIC BLOOD PRESSURE: 136 MMHG | OXYGEN SATURATION: 95 % | TEMPERATURE: 97.7 F | RESPIRATION RATE: 16 BRPM | DIASTOLIC BLOOD PRESSURE: 99 MMHG

## 2019-02-28 VITALS
SYSTOLIC BLOOD PRESSURE: 155 MMHG | DIASTOLIC BLOOD PRESSURE: 84 MMHG | RESPIRATION RATE: 16 BRPM | OXYGEN SATURATION: 98 %

## 2019-02-28 DIAGNOSIS — R51.0 HEADACHE DUE TO LOW CEREBROSPINAL FLUID PRESSURE: ICD-10-CM

## 2019-02-28 LAB
APTT PPP: 30 SEC (ref 22–37)
INR PPP: 0.97 (ref 0.86–1.14)
PLATELET # BLD AUTO: 309 10E9/L (ref 150–450)

## 2019-02-28 PROCEDURE — 85730 THROMBOPLASTIN TIME PARTIAL: CPT | Performed by: RADIOLOGY

## 2019-02-28 PROCEDURE — A9270 NON-COVERED ITEM OR SERVICE: HCPCS | Mod: GY | Performed by: STUDENT IN AN ORGANIZED HEALTH CARE EDUCATION/TRAINING PROGRAM

## 2019-02-28 PROCEDURE — 62273 INJECT EPIDURAL PATCH: CPT

## 2019-02-28 PROCEDURE — 40000166 ZZH STATISTIC PP CARE STAGE 1

## 2019-02-28 PROCEDURE — 85049 AUTOMATED PLATELET COUNT: CPT | Performed by: RADIOLOGY

## 2019-02-28 PROCEDURE — 85610 PROTHROMBIN TIME: CPT | Performed by: RADIOLOGY

## 2019-02-28 PROCEDURE — 25000132 ZZH RX MED GY IP 250 OP 250 PS 637: Performed by: STUDENT IN AN ORGANIZED HEALTH CARE EDUCATION/TRAINING PROGRAM

## 2019-02-28 PROCEDURE — 25500064 ZZH RX 255 OP 636: Performed by: RADIOLOGY

## 2019-02-28 PROCEDURE — 25000125 ZZHC RX 250: Performed by: RADIOLOGY

## 2019-02-28 PROCEDURE — 40000556 ZZH STATISTIC PERIPHERAL IV START W US GUIDANCE

## 2019-02-28 RX ORDER — NICOTINE POLACRILEX 4 MG
15-30 LOZENGE BUCCAL
Status: DISCONTINUED | OUTPATIENT
Start: 2019-02-28 | End: 2019-02-28 | Stop reason: HOSPADM

## 2019-02-28 RX ORDER — METOCLOPRAMIDE 10 MG/1
10 TABLET ORAL ONCE
Status: COMPLETED | OUTPATIENT
Start: 2019-02-28 | End: 2019-02-28

## 2019-02-28 RX ORDER — DEXTROSE MONOHYDRATE 25 G/50ML
25-50 INJECTION, SOLUTION INTRAVENOUS
Status: DISCONTINUED | OUTPATIENT
Start: 2019-02-28 | End: 2019-02-28 | Stop reason: HOSPADM

## 2019-02-28 RX ORDER — IOPAMIDOL 408 MG/ML
20 INJECTION, SOLUTION INTRATHECAL ONCE
Status: COMPLETED | OUTPATIENT
Start: 2019-02-28 | End: 2019-02-28

## 2019-02-28 RX ORDER — SODIUM CHLORIDE 9 MG/ML
INJECTION, SOLUTION INTRAVENOUS CONTINUOUS
Status: ACTIVE | OUTPATIENT
Start: 2019-02-28 | End: 2019-02-28

## 2019-02-28 RX ORDER — NICOTINE POLACRILEX 4 MG
15-30 LOZENGE BUCCAL
Status: DISCONTINUED | OUTPATIENT
Start: 2019-02-28 | End: 2019-03-01 | Stop reason: HOSPADM

## 2019-02-28 RX ORDER — LIDOCAINE HYDROCHLORIDE 10 MG/ML
5 INJECTION, SOLUTION EPIDURAL; INFILTRATION; INTRACAUDAL; PERINEURAL ONCE
Status: COMPLETED | OUTPATIENT
Start: 2019-02-28 | End: 2019-02-28

## 2019-02-28 RX ORDER — LIDOCAINE 40 MG/G
CREAM TOPICAL
Status: DISCONTINUED | OUTPATIENT
Start: 2019-02-28 | End: 2019-02-28 | Stop reason: HOSPADM

## 2019-02-28 RX ORDER — DEXTROSE MONOHYDRATE 25 G/50ML
25-50 INJECTION, SOLUTION INTRAVENOUS
Status: DISCONTINUED | OUTPATIENT
Start: 2019-02-28 | End: 2019-03-01 | Stop reason: HOSPADM

## 2019-02-28 RX ORDER — BUTALBITAL, ACETAMINOPHEN AND CAFFEINE 50; 325; 40 MG/1; MG/1; MG/1
TABLET ORAL
Qty: 10 TABLET | Refills: 0 | OUTPATIENT
Start: 2019-02-28

## 2019-02-28 RX ADMIN — METOCLOPRAMIDE HYDROCHLORIDE 10 MG: 10 TABLET ORAL at 13:59

## 2019-02-28 RX ADMIN — IOPAMIDOL 2 ML: 408 INJECTION, SOLUTION INTRATHECAL at 11:53

## 2019-02-28 RX ADMIN — LIDOCAINE HYDROCHLORIDE 5 ML: 10 INJECTION, SOLUTION EPIDURAL; INFILTRATION; INTRACAUDAL; PERINEURAL at 11:42

## 2019-02-28 NOTE — DISCHARGE INSTRUCTIONS
[facility]                                      Radiology Discharge instructions Post Blood Patch         AFTER YOU GO HOME      Relax and take it easy for 24 hours    We suggest bedrest until the next morning, except to go to the bathroom and for meals.    You may resume normal activity tomorrow    You may remove the bandage on your back in the evening or next morning    You may resume bathing the next day    Drink at least 4 glasses of extra fluid today if not on a fluid restriction    DO NOT drive or operate machinery at home or at work for at least 24 hours                   CALL YOUR PRIMARY PHYSICIAN IF:    If you start to leak a large amount of fluid from the puncture site, lie down flat on your back. Call your primary physician, they will tell you if you need or return to the hosptial    You develop a severe headache    You develop nausea or vomiting     You develop a temperature of 101 degrees or greater                 [facility] RADIOLOGY DEPARTMENT            Procedure Physician: Dr Madden                                                Date of Procedure:February 28, 2019               [facility]...........[phone].......Ask for the Radiologist on call. Someone is on call 24 hour/day               [facility] Toll Free Number.........[phone].....Monday-Friday 8:00 am to 4:30 pm    .

## 2019-02-28 NOTE — IP AVS SNAPSHOT
MRN:[id number]                      After Visit Summary   2/28/2019    Ada Welch    MRN: [id number]           Visit Information        Department      2/28/2019  9:30 AM [address] [facility]          Review of your medicines      UNREVIEWED medicines. Ask your doctor about these medicines       Dose / Directions   ACETAMINOPHEN PO      Dose:  1000 mg  Take 1,000 mg by mouth every 6 hours as needed for pain Take with tramadol  Refills:  0     ADVAIR DISKUS 250-50 MCG/DOSE inhaler  Generic drug:  fluticasone-salmeterol      Dose:  1 puff  Inhale 1 puff into the lungs 2 times daily as needed  Refills:  0     albuterol 108 (90 Base) MCG/ACT inhaler  Commonly known as:  PROAIR HFA/PROVENTIL HFA/VENTOLIN HFA      Dose:  2 puff  Inhale 2 puffs into the lungs every 6 hours as needed  Refills:  0     * AMITRIPTYLINE HCL PO      Dose:  50 mg  Take 50 mg by mouth At Bedtime  Refills:  0     * amitriptyline 10 MG tablet  Commonly known as:  ELAVIL  Used for:  Chronic migraine without aura without status migrainosus, not intractable      TAKE 3 TABLETS BY MOUTH AT BEDTIME  Quantity:  270 tablet  Refills:  3     buprenorphine 10 MCG/HR WK patch  Commonly known as:  BUTRANS  Used for:  Postlaminectomy syndrome of lumbar region      Dose:  1 patch  Start taking on:  3/9/2019  Place 1 patch onto the skin every 7 days  Quantity:  4 patch  Refills:  2     butalbital-acetaminophen-caffeine -40 MG tablet  Commonly known as:  FIORICET/ESGIC  Used for:  Episodic tension-type headache, not intractable      TAKE ONE TABLET BY MOUTH EVERY 4 HOURS AS NEEDED. MAX 10 PER MONTH.  Quantity:  10 tablet  Refills:  0     cetirizine 10 MG tablet  Commonly known as:  zyrTEC      Dose:  10 mg  Take 10 mg by mouth daily  Refills:  0     DULoxetine 30 MG capsule  Commonly known as:  CYMBALTA  Used for:  Lumbar post-laminectomy syndrome      Take 60mg in the morning and 30mg at bedtime  Quantity:  270 capsule  Refills:   1     eletriptan 20 MG tablet  Commonly known as:  RELPAX  Used for:  Chronic daily headache, Intractable migraine without aura and with status migrainosus      Dose:  20 mg  Take 1 tablet (20 mg) by mouth at onset of headache for migraine May repeat in 2 hours. Max 4 tablets/24 hours.  Quantity:  18 tablet  Refills:  1     EPINEPHrine 0.3 MG/0.3ML injection 2-pack  Commonly known as:  EPIPEN/ADRENACLICK/or ANY BX GENERIC EQUIV      Dose:  0.3 mg  Inject 0.3 mg into the muscle  Refills:  0     famotidine 20 MG tablet  Commonly known as:  PEPCID  Used for:  Hiatal hernia, Mast cell disorder      Dose:  20 mg  Take 1 tablet (20 mg) by mouth At Bedtime  Quantity:  90 tablet  Refills:  1     fluticasone 50 MCG/ACT nasal spray  Commonly known as:  FLONASE  Used for:  Chronic rhinitis, unspecified type      INSTILL 2 SPRAYS INTO BOTH NOSTRILS DAILY  Quantity:  48 mL  Refills:  3     galcanezumab-gnlm 120 MG/ML injection  Commonly known as:  EMGALITY  Used for:  Intractable chronic migraine without aura and without status migrainosus  Ask about: Should I take this medication?      Dose:  240 mg  Inject 2 mLs (240 mg) Subcutaneous once for 1 dose  Quantity:  2 mL  Refills:  0     levothyroxine 25 MCG tablet  Commonly known as:  SYNTHROID  Used for:  Hypothyroidism, unspecified type      Dose:  25 mcg  Take 1 tablet (25 mcg) by mouth daily  Quantity:  90 tablet  Refills:  3     linaclotide 145 MCG capsule  Commonly known as:  LINZESS  Used for:  Chronic idiopathic constipation      Dose:  145 mcg  Take 1 capsule (145 mcg) by mouth every morning (before breakfast) Last refill until GI clinic follow up.  Quantity:  90 capsule  Refills:  2     metoclopramide 10 MG tablet  Commonly known as:  REGLAN  Used for:  Intractable chronic migraine without aura and without status migrainosus      Dose:  10 mg  Take 1 tablet (10 mg) by mouth 4 times daily (before meals and nightly)  Quantity:  40 tablet  Refills:  3     MULTIVITAMIN PO       Dose:  1 tablet  Take 1 tablet by mouth daily  Refills:  0     omeprazole 40 MG DR capsule  Commonly known as:  priLOSEC  Used for:  Eosinophilic esophagitis      Dose:  40 mg  Take 1 capsule (40 mg) by mouth 2 times daily as needed (prn)  Quantity:  180 capsule  Refills:  2     ondansetron 4 MG ODT tab  Commonly known as:  ZOFRAN-ODT  Used for:  Migraine without status migrainosus, not intractable, unspecified migraine type      Dose:  4-8 mg  Take 1-2 tablets (4-8 mg) by mouth every 12 hours as needed for nausea  Quantity:  60 tablet  Refills:  1     ondansetron 4 MG tablet  Commonly known as:  ZOFRAN      Dose:  4 mg  Take 4 mg by mouth  Refills:  0     oxyCODONE 5 MG tablet  Commonly known as:  ROXICODONE  Used for:  Postlaminectomy syndrome      Dose:  2.5 mg  Take 0.5 tablets (2.5 mg) by mouth every 6 hours as needed for severe pain  Quantity:  15 tablet  Refills:  0     PROBIOTIC DAILY PO      Dose:  2 packet  Take 2 packets by mouth every morning  Refills:  0     propranolol  MG 24 hr capsule  Commonly known as:  INDERAL LA      Dose:  120 mg  Take 120 mg by mouth daily  Refills:  11     propranolol SR BEADS 120 MG 24 hr capsule  Commonly known as:  INDREAL XL  Used for:  Sinus tachycardia      Dose:  120 mg  Take 120 mg by mouth daily  Quantity:  30 capsule  Refills:  11     rizatriptan 5 MG ODT  Commonly known as:  MAXALT-MLT  Used for:  Intractable chronic migraine without aura and without status migrainosus      TAKE 1-2 TABLETS (5-10 MG) BY MOUTH AT ONSET OF HEADACHE FOR MIGRAINE (MAX 30 MG IN 24 HOURS)  Quantity:  9 tablet  Refills:  11     senna 8.6 MG tablet  Commonly known as:  SENOKOT      Dose:  17.2-34.4 mg  Take 17.2-34.4 mg by mouth  Refills:  0     SUMAtriptan 6 MG/0.5ML pen injector kit  Commonly known as:  IMITREX STATDOSE  Used for:  Chronic migraine without aura without status migrainosus, not intractable      Inject 0.5 ml (6 mg) subcutaneously at onset of migraine, May repeat in  1 hour , Max 12 mg/24 hrs  Quantity:  2 kit  Refills:  1     tiZANidine 4 MG tablet  Commonly known as:  ZANAFLEX  Used for:  Post laminectomy syndrome      Dose:  4 mg  Take 1 tablet (4 mg) by mouth 4 times daily as needed for muscle spasms  Quantity:  360 tablet  Refills:  1     traMADol 50 MG tablet  Commonly known as:  ULTRAM  Used for:  Postlaminectomy syndrome      Dose:  50 mg  Start taking on:  3/9/2019  Take 1 tablet (50 mg) by mouth every 6 hours as needed for severe pain  Quantity:  60 tablet  Refills:  1     vitamin B complex with vitamin C tablet      Dose:  1 tablet  Take 1 tablet by mouth daily  Refills:  0     VITAMIN D (CHOLECALCIFEROL) PO      Dose:  2000 Units  Take 2,000 Units by mouth daily  Refills:  0     ZONISAMIDE PO      Dose:  75 mg  Take 75 mg by mouth daily  Refills:  0         * This list has 2 medication(s) that are the same as other medications prescribed for you. Read the directions carefully, and ask your doctor or other care provider to review them with you.            CONTINUE these medicines which have NOT CHANGED       Dose / Directions   CEFALY KIT Pauly  Used for:  Intractable chronic migraine without aura and without status migrainosus      Dose:  1 Device  1 Device daily as needed  Quantity:  1 Device  Refills:  0     COMPRESSION STOCKINGS  Used for:  Orthostatic hypotension      Dose:  1 each  1 each daily 20-30 mmHg Thigh Length measure and fit, color and style per patient preference. Doff n jose per need.  Quantity:  3 each  Refills:  3     medical cannabis (Patient's own supply.  Not a prescription)      Dose:  1 Dose  Take 1 Dose by mouth See Admin Instructions (This is NOT a prescription, and does not certify that the patient has a qualifying medical condition for medical cannabis.  The purpose of this order is  to document that the patient reports taking medical cannabis.)  Refills:  0              Protect others around you: Learn how to safely use, store and throw  away your medicines at www.disposemymeds.org.       Follow-ups after your visit       Your next 10 appointments already scheduled    Mar 04, 2019 10:00 AM CST  (Arrive by 9:45 AM)  Return Visit with Noemy Nguyen MD  [facility] ([facility]) [address] [place] [address]  3rd [facility] [address]  [phone]   Mar 11, 2019 12:15 PM CDT  (Arrive by 12:00 PM)  Return Allergy with Vahe Rodriguez MD  [facility] ([facility]) [address] [place] [address]  [address] [place] [id number] [phone]   Do not take anti-histamines or Zantac for seven day prior to your appointment.   Mar 29, 2019 11:00 AM CDT  (Arrive by 10:45 AM)  New Weight Management Visit with Alesha Schroeder NP  [facility] Medical Weight Management ([facility]) [address] [place] [address]  4th [facility] [id number] [phone]   Mar 29, 2019  1:00 PM CDT  (Arrive by 12:45 PM)  New Weight Managment Nutrition with Daylin Adams RD  [facility] Surgical [facility] Management ([facility]) [address] [place] [phone]  [phone]   Apr 01, 2019  4:45 PM CDT  (Arrive by 4:30 PM)  Return Visit with Zoe Barahona MD  [facility] Ear Nose and Throat ([facility]) [address] [place] [phone]  [phone]   Apr 04, 2019  8:55 AM CDT  (Arrive by 8:40 AM)  Return Visit with Ellyn Rivera MD  [facility] ([facility]) [address] [place] [id number] [phone]   If you are coming in for evaluation of pain: Please note that you may not be prescribed a controlled substance such as pain medication on your first visit.  Your provider will ask for previous medical records, and determine if medications are appropriate.      Care Instructions        Further instructions from your care team                                                              [facility]                                      Radiology Discharge instructions Post Blood Patch         AFTER YOU GO HOME      Relax and take it easy for 24 hours    We suggest bedrest until the next morning, except to go to the bathroom and for meals.    You may resume normal activity tomorrow    You may remove the bandage on your back in the evening or next morning    You may resume bathing the next day    Drink at least 4 glasses of extra fluid today if not on a fluid restriction    DO NOT drive or operate machinery at home or at work for at least 24 hours                   CALL YOUR PRIMARY PHYSICIAN IF:    If you start to leak a large amount of fluid from the puncture site, lie down flat on your back. Call your primary physician, they will tell you if you need or return to the hosptial    You develop a severe headache    You develop nausea or vomiting     You develop a temperature of 101 degrees or greater                 [facility] RADIOLOGY DEPARTMENT            Procedure Physician: Dr Madden                                                Date of Procedure:February 28, 2019               [facility]...........[phone].......Ask for the Radiologist on call. Someone is on call 24 hour/day               [facility] Toll Free Number.........[phone].....Monday-Friday 8:00 am to 4:30 pm    .                                          Additional Information About Your Visit       [facility] Information    [facility] ([facility]) gives you secure access to your electronic health record. If you see a primary care provider, you can also send messages to your care team and make appointments. If you have questions, please call your primary care clinic.  If you do not have a primary care provider, please call [phone] and they will assist you.       Care EveryWhere ID    This is your Care EveryWhere ID. This could be used by  other organizations to access your [facility] medical records  [id number]       Your Vitals Were     Blood Pressure   151/93   (BP Location: Right arm)    Temperature   97.7  F (36.5  C) (Oral)    Respirations   20    Pulse Oximetry   94%           Primary Care Provider Office Phone # Fax #    Ellyn Rivera [phone]      Equal Access to Services    [facility]: Hadii aad ku hadasho Soomaali, waaxda luqadaha, qaybta kaalmada adeegyada, waxay idiin hayaan adeeg khisamarsh la'aan . So [facility] [phone].    ATENCIÓN: Si habla español, tiene a sweeney disposición servicios gratuitos de asistencia lingüística. Llame al [phone].    We comply with applicable federal civil rights laws and [place] laws. We do not discriminate on the basis of race, color, national origin, age, disability, sex, sexual orientation, or gender identity.           Thank you!    Thank you for choosing [facility] for your care. Our goal is always to provide you with excellent care. Hearing back from our patients is one way we can continue to improve our services. Please take a few minutes to complete the written survey that you may receive in the mail after you visit with us. Thank you!            Medication List      Medications          Morning Afternoon Evening Bedtime As Needed    CEFALY KIT Pauly  INSTRUCTIONS:  1 Device daily as needed                     COMPRESSION STOCKINGS  INSTRUCTIONS:  1 each daily 20-30 mmHg Thigh Length measure and fit, color and style per patient preference. Doff n jose per need.  Doctor's comments:  20-30 mmHg Thigh Length measure and fit, color and style per patient preference. Doff n jose per need.                     medical cannabis (Patient's own supply.  Not a prescription)  INSTRUCTIONS:  Take 1 Dose by mouth See Admin Instructions (This is NOT a prescription, and does not certify that the patient has a qualifying medical condition for medical cannabis.  The purpose of this order  is  to document that the patient reports taking medical cannabis.)                       ASK your doctor about these medications          Morning Afternoon Evening Bedtime As Needed    ACETAMINOPHEN PO  INSTRUCTIONS:  Take 1,000 mg by mouth every 6 hours as needed for pain Take with tramadol                     ADVAIR DISKUS 250-50 MCG/DOSE inhaler  INSTRUCTIONS:  Inhale 1 puff into the lungs 2 times daily as needed  Generic drug:  fluticasone-salmeterol                     albuterol 108 (90 Base) MCG/ACT inhaler  Also known as:  PROAIR HFA/PROVENTIL HFA/VENTOLIN HFA  INSTRUCTIONS:  Inhale 2 puffs into the lungs every 6 hours as needed                     * AMITRIPTYLINE HCL PO  INSTRUCTIONS:  Take 50 mg by mouth At Bedtime                     * amitriptyline 10 MG tablet  Also known as:  ELAVIL  INSTRUCTIONS:  TAKE 3 TABLETS BY MOUTH AT BEDTIME                     buprenorphine 10 MCG/HR WK patch  Also known as:  BUTRANS  Start taking on:  3/9/2019  INSTRUCTIONS:  Place 1 patch onto the skin every 7 days  Doctor's comments:  Patient may fill next 3/9/19                     butalbital-acetaminophen-caffeine -40 MG tablet  Also known as:  FIORICET/ESGIC  INSTRUCTIONS:  TAKE ONE TABLET BY MOUTH EVERY 4 HOURS AS NEEDED. MAX 10 PER MONTH.                     cetirizine 10 MG tablet  Also known as:  zyrTEC  INSTRUCTIONS:  Take 10 mg by mouth daily                     DULoxetine 30 MG capsule  Also known as:  CYMBALTA  INSTRUCTIONS:  Take 60mg in the morning and 30mg at bedtime                     eletriptan 20 MG tablet  Also known as:  RELPAX  INSTRUCTIONS:  Take 1 tablet (20 mg) by mouth at onset of headache for migraine May repeat in 2 hours. Max 4 tablets/24 hours.                     EPINEPHrine 0.3 MG/0.3ML injection 2-pack  Also known as:  EPIPEN/ADRENACLICK/or ANY BX GENERIC EQUIV  INSTRUCTIONS:  Inject 0.3 mg into the muscle                     famotidine 20 MG tablet  Also known as:  PEPCID  INSTRUCTIONS:   Take 1 tablet (20 mg) by mouth At Bedtime                     fluticasone 50 MCG/ACT nasal spray  Also known as:  FLONASE  INSTRUCTIONS:  INSTILL 2 SPRAYS INTO BOTH NOSTRILS DAILY                     galcanezumab-gnlm 120 MG/ML injection  Also known as:  EMGALITY  INSTRUCTIONS:  Inject 2 mLs (240 mg) Subcutaneous once for 1 dose  Ask about: Should I take this medication?                     levothyroxine 25 MCG tablet  Also known as:  SYNTHROID  INSTRUCTIONS:  Take 1 tablet (25 mcg) by mouth daily                     linaclotide 145 MCG capsule  Also known as:  LINZESS  INSTRUCTIONS:  Take 1 capsule (145 mcg) by mouth every morning (before breakfast) Last refill until GI clinic follow up.                     metoclopramide 10 MG tablet  Also known as:  REGLAN  INSTRUCTIONS:  Take 1 tablet (10 mg) by mouth 4 times daily (before meals and nightly)                     MULTIVITAMIN PO  INSTRUCTIONS:  Take 1 tablet by mouth daily                     omeprazole 40 MG DR capsule  Also known as:  priLOSEC  INSTRUCTIONS:  Take 1 capsule (40 mg) by mouth 2 times daily as needed (prn)                     ondansetron 4 MG ODT tab  Also known as:  ZOFRAN-ODT  INSTRUCTIONS:  Take 1-2 tablets (4-8 mg) by mouth every 12 hours as needed for nausea                     ondansetron 4 MG tablet  Also known as:  ZOFRAN  INSTRUCTIONS:  Take 4 mg by mouth                     oxyCODONE 5 MG tablet  Also known as:  ROXICODONE  INSTRUCTIONS:  Take 0.5 tablets (2.5 mg) by mouth every 6 hours as needed for severe pain                     PROBIOTIC DAILY PO  INSTRUCTIONS:  Take 2 packets by mouth every morning                     propranolol  MG 24 hr capsule  Also known as:  INDERAL LA  INSTRUCTIONS:  Take 120 mg by mouth daily                     propranolol SR BEADS 120 MG 24 hr capsule  Also known as:  INDREAL XL  INSTRUCTIONS:  Take 120 mg by mouth daily                     rizatriptan 5 MG ODT  Also known as:   MAXALT-MLT  INSTRUCTIONS:  TAKE 1-2 TABLETS (5-10 MG) BY MOUTH AT ONSET OF HEADACHE FOR MIGRAINE (MAX 30 MG IN 24 HOURS)                     senna 8.6 MG tablet  Also known as:  SENOKOT  INSTRUCTIONS:  Take 17.2-34.4 mg by mouth                     SUMAtriptan 6 MG/0.5ML pen injector kit  Also known as:  IMITREX STATDOSE  INSTRUCTIONS:  Inject 0.5 ml (6 mg) subcutaneously at onset of migraine, May repeat in 1 hour , Max 12 mg/24 hrs                     tiZANidine 4 MG tablet  Also known as:  ZANAFLEX  INSTRUCTIONS:  Take 1 tablet (4 mg) by mouth 4 times daily as needed for muscle spasms                     traMADol 50 MG tablet  Also known as:  ULTRAM  Start taking on:  3/9/2019  INSTRUCTIONS:  Take 1 tablet (50 mg) by mouth every 6 hours as needed for severe pain  Doctor's comments:  May fill next on 3/9/19                     vitamin B complex with vitamin C tablet  INSTRUCTIONS:  Take 1 tablet by mouth daily                     VITAMIN D (CHOLECALCIFEROL) PO  INSTRUCTIONS:  Take 2,000 Units by mouth daily                     ZONISAMIDE PO  INSTRUCTIONS:  Take 75 mg by mouth daily                        * This list has 2 medication(s) that are the same as other medications prescribed for you. Read the directions carefully, and ask your doctor or other care provider to review them with you.

## 2019-02-28 NOTE — PROGRESS NOTES
Pt has returned to unit 2a s/p blood patch. Low back site CDI, soft, flat. Pt on flat bedrest. Pt declines PO. Family at bedside.

## 2019-02-28 NOTE — TELEPHONE ENCOUNTER
This medication is not recommended for routine use. If she needs more than 10 tabs per month, I would like her to schedule an appointment with me or her neurologist to review. Thanks, ML

## 2019-02-28 NOTE — IP AVS SNAPSHOT
[address] [address]                                    After Visit Summary   2/28/2019    Ada Welch    MRN: [id number]           After Visit Summary Signature Page    I have received my discharge instructions, and my questions have been answered. I have discussed any challenges I see with this plan with the nurse or doctor.    ..........................................................................................................................................  Patient/Patient Representative Signature      ..........................................................................................................................................  Patient Representative Print Name and Relationship to Patient    ..................................................               ................................................  Date                                   Time    ..........................................................................................................................................  Reviewed by Signature/Title    ...................................................              ..............................................  Date                                               Time          22EPIC Rev 08/18

## 2019-03-03 ENCOUNTER — VIRTUAL VISIT (OUTPATIENT)
Dept: RADIOLOGY | Facility: CLINIC | Age: 49
End: 2019-03-03
Payer: COMMERCIAL

## 2019-03-03 ENCOUNTER — HOSPITAL ENCOUNTER (EMERGENCY)
Facility: CLINIC | Age: 49
Discharge: HOME | End: 2019-03-03
Attending: EMERGENCY MEDICINE | Admitting: EMERGENCY MEDICINE
Payer: COMMERCIAL

## 2019-03-03 ENCOUNTER — APPOINTMENT (OUTPATIENT)
Dept: CT IMAGING | Facility: CLINIC | Age: 49
End: 2019-03-03
Attending: EMERGENCY MEDICINE
Payer: COMMERCIAL

## 2019-03-03 VITALS
RESPIRATION RATE: 16 BRPM | OXYGEN SATURATION: 98 % | SYSTOLIC BLOOD PRESSURE: 112 MMHG | TEMPERATURE: 97.4 F | DIASTOLIC BLOOD PRESSURE: 81 MMHG

## 2019-03-03 DIAGNOSIS — Q79.60 EDS (EHLERS-DANLOS SYNDROME): Primary | ICD-10-CM

## 2019-03-03 DIAGNOSIS — R51.9 CHRONIC DAILY HEADACHE: ICD-10-CM

## 2019-03-03 LAB
ANION GAP SERPL CALCULATED.3IONS-SCNC: 10 MMOL/L (ref 3–14)
BASOPHILS # BLD AUTO: 0 10E9/L (ref 0–0.2)
BASOPHILS NFR BLD AUTO: 0.1 %
BUN SERPL-MCNC: 9 MG/DL (ref 7–30)
CALCIUM SERPL-MCNC: 8.6 MG/DL (ref 8.5–10.1)
CHLORIDE SERPL-SCNC: 102 MMOL/L (ref 94–109)
CO2 SERPL-SCNC: 25 MMOL/L (ref 20–32)
CREAT SERPL-MCNC: 0.82 MG/DL (ref 0.52–1.04)
DIFFERENTIAL METHOD BLD: NORMAL
EOSINOPHIL # BLD AUTO: 0 10E9/L (ref 0–0.7)
EOSINOPHIL NFR BLD AUTO: 0.3 %
ERYTHROCYTE [DISTWIDTH] IN BLOOD BY AUTOMATED COUNT: 13.7 % (ref 10–15)
GFR SERPL CREATININE-BSD FRML MDRD: 86 ML/MIN/{1.73_M2}
GLUCOSE SERPL-MCNC: 95 MG/DL (ref 70–99)
HCT VFR BLD AUTO: 41.6 % (ref 35–47)
HGB BLD-MCNC: 13.7 G/DL (ref 11.7–15.7)
IMM GRANULOCYTES # BLD: 0 10E9/L (ref 0–0.4)
IMM GRANULOCYTES NFR BLD: 0.3 %
LYMPHOCYTES # BLD AUTO: 2.1 10E9/L (ref 0.8–5.3)
LYMPHOCYTES NFR BLD AUTO: 23.6 %
MCH RBC QN AUTO: 27.9 PG (ref 26.5–33)
MCHC RBC AUTO-ENTMCNC: 32.9 G/DL (ref 31.5–36.5)
MCV RBC AUTO: 85 FL (ref 78–100)
MONOCYTES # BLD AUTO: 0.7 10E9/L (ref 0–1.3)
MONOCYTES NFR BLD AUTO: 7.5 %
NEUTROPHILS # BLD AUTO: 6.1 10E9/L (ref 1.6–8.3)
NEUTROPHILS NFR BLD AUTO: 68.2 %
NRBC # BLD AUTO: 0 10*3/UL
NRBC BLD AUTO-RTO: 0 /100
PLATELET # BLD AUTO: 329 10E9/L (ref 150–450)
POTASSIUM SERPL-SCNC: 3.4 MMOL/L (ref 3.4–5.3)
RBC # BLD AUTO: 4.91 10E12/L (ref 3.8–5.2)
SODIUM SERPL-SCNC: 138 MMOL/L (ref 133–144)
WBC # BLD AUTO: 8.9 10E9/L (ref 4–11)

## 2019-03-03 PROCEDURE — 25000128 H RX IP 250 OP 636: Performed by: EMERGENCY MEDICINE

## 2019-03-03 PROCEDURE — 96375 TX/PRO/DX INJ NEW DRUG ADDON: CPT | Performed by: EMERGENCY MEDICINE

## 2019-03-03 PROCEDURE — 96376 TX/PRO/DX INJ SAME DRUG ADON: CPT | Performed by: EMERGENCY MEDICINE

## 2019-03-03 PROCEDURE — 99284 EMERGENCY DEPT VISIT MOD MDM: CPT | Mod: Z6 | Performed by: EMERGENCY MEDICINE

## 2019-03-03 PROCEDURE — 96374 THER/PROPH/DIAG INJ IV PUSH: CPT | Performed by: EMERGENCY MEDICINE

## 2019-03-03 PROCEDURE — 96361 HYDRATE IV INFUSION ADD-ON: CPT | Performed by: EMERGENCY MEDICINE

## 2019-03-03 PROCEDURE — 99284 EMERGENCY DEPT VISIT MOD MDM: CPT | Mod: 25 | Performed by: EMERGENCY MEDICINE

## 2019-03-03 PROCEDURE — 80048 BASIC METABOLIC PNL TOTAL CA: CPT | Performed by: EMERGENCY MEDICINE

## 2019-03-03 PROCEDURE — 85025 COMPLETE CBC W/AUTO DIFF WBC: CPT | Performed by: EMERGENCY MEDICINE

## 2019-03-03 PROCEDURE — 70450 CT HEAD/BRAIN W/O DYE: CPT

## 2019-03-03 RX ORDER — METOCLOPRAMIDE HYDROCHLORIDE 5 MG/ML
10 INJECTION INTRAMUSCULAR; INTRAVENOUS ONCE
Status: COMPLETED | OUTPATIENT
Start: 2019-03-03 | End: 2019-03-03

## 2019-03-03 RX ORDER — KETOROLAC TROMETHAMINE 30 MG/ML
30 INJECTION, SOLUTION INTRAMUSCULAR; INTRAVENOUS ONCE
Status: COMPLETED | OUTPATIENT
Start: 2019-03-03 | End: 2019-03-03

## 2019-03-03 RX ORDER — ONDANSETRON 2 MG/ML
8 INJECTION INTRAMUSCULAR; INTRAVENOUS ONCE
Status: COMPLETED | OUTPATIENT
Start: 2019-03-03 | End: 2019-03-03

## 2019-03-03 RX ADMIN — ONDANSETRON 8 MG: 2 INJECTION INTRAMUSCULAR; INTRAVENOUS at 20:45

## 2019-03-03 RX ADMIN — KETOROLAC TROMETHAMINE 30 MG: 30 INJECTION, SOLUTION INTRAMUSCULAR at 23:02

## 2019-03-03 RX ADMIN — KETOROLAC TROMETHAMINE 30 MG: 30 INJECTION, SOLUTION INTRAMUSCULAR at 19:02

## 2019-03-03 RX ADMIN — SODIUM CHLORIDE 1000 ML: 9 INJECTION, SOLUTION INTRAVENOUS at 20:45

## 2019-03-03 RX ADMIN — METOCLOPRAMIDE 10 MG: 5 INJECTION, SOLUTION INTRAMUSCULAR; INTRAVENOUS at 19:02

## 2019-03-03 RX ADMIN — SODIUM CHLORIDE 1000 ML: 9 INJECTION, SOLUTION INTRAVENOUS at 19:02

## 2019-03-03 ASSESSMENT — ENCOUNTER SYMPTOMS
DIAPHORESIS: 1
SHORTNESS OF BREATH: 0
HEADACHES: 1
VOMITING: 1
DYSURIA: 0
COUGH: 0
WEAKNESS: 0
NAUSEA: 1
RHINORRHEA: 0
SORE THROAT: 0
LIGHT-HEADEDNESS: 1
FEVER: 0

## 2019-03-03 NOTE — PROGRESS NOTES
Patient contacted writer concerned about worsening symptoms since epidural lumbar puncture dated 2/28/19.    Patient has been experiencing worsening headaches and lightheadedness since the procedure. Patient cannot stand for more that a short time before feeling lightheaded and nauseated.     Writer instructed patient to come to the ED for further evaluation. Patient stated she had a .     Dewayne Moore  Radiology Resident R3

## 2019-03-03 NOTE — ED NOTES
Bed: [facility]  Expected date:   Expected time:   Means of arrival:   Comments:  Use if appropriate

## 2019-03-03 NOTE — ED AVS SNAPSHOT
[facility], [place], Emergency Department  [address] [place] [phone]  Phone:  [phone]                                    Ada Welch   MRN: [id number]    Department:  [facility], Emergency Department   Date of Visit:  3/3/2019           After Visit Summary Signature Page    I have received my discharge instructions, and my questions have been answered. I have discussed any challenges I see with this plan with the nurse or doctor.    ..........................................................................................................................................  Patient/Patient Representative Signature      ..........................................................................................................................................  Patient Representative Print Name and Relationship to Patient    ..................................................               ................................................  Date                                   Time    ..........................................................................................................................................  Reviewed by Signature/Title    ...................................................              ..............................................  Date                                               Time          22EPIC Rev 08/18

## 2019-03-04 DIAGNOSIS — G43.711 INTRACTABLE CHRONIC MIGRAINE WITHOUT AURA AND WITH STATUS MIGRAINOSUS: Primary | ICD-10-CM

## 2019-03-04 NOTE — ED PROVIDER NOTES
[place] EMERGENCY DEPARTMENT ([facility])  3/03/19   History     Chief Complaint   Patient presents with     Headache     The history is provided by the patient and medical records.     Ada Welch is a 48 year old female with a medical history significant for chronic pain syndrome, Tom-Danlos syndrome who presents to the Emergency Department for evaluation of an ongoing headache and associated lightheadedness.  The patient reports that she has had a constant headache since December 2018.  She reports that this headache started after an episode of vomiting and has been constant since that time.  She has been seeing neurology for this issue and she did undergo a blood patch procedure on 2/28/2019 for suspected low pressure headache.  She notes that she did have improvement of her headache that first day after the procedure, but it has since worsened once again.  She also reports that this did not help with her lightheadedness.  She reports that she has lightheadedness and a worsening of her headache whenever she stands up, she reports that her vision will begin to tunnel and she will begin hearing abnormal sounds in her ears, which she describes as water rushing.  She reports that she will also become diaphoretic upon standing.  She will have to lay down in order to not lose consciousness, she will then become tachycardic.  She denies ever losing consciousness.  The patient reports that she has also had worsening of floaters/spots in her vision and when her headache is at its worst she will have double vision as well.  No double vision at this time. The patient has not seen an ophthalmologist for her vision changes.  She reports that she is also having daily nausea and vomiting.  She notes that she has not been eating a significant amount over the last 2 months secondary to her symptoms, but she is able to drink plenty of fluids.  The patient notes chronic paresthesias in her upper and lower  extremities, but this is unchanged from baseline.  She reports light sensitivity as well.  She denies any weakness and denies any loss of bowel or bladder control.  She denies any abdominal pain or fevers.  Per review of patient's chart, patient's last imaging of the head was an MRI brain, MRA brain and MRI angiogram of the neck on 7/25/2018.    MRI brain without and with contrast 7/25/2018  Impression:  1. Brain MRI: No findings to explain patient's symptoms. No  significant change since 6/2/2018.  2. Stable cerebellar tonsillar ectopia.  3. Stable minimal nonspecific cerebral white matter T2  hyperintensities.  4. Normal head and neck MRA.    I have reviewed the Medications, Allergies, Past Medical and Surgical History, and Social History in the [employer] system.    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     Chronic sinusitis 00/2007    Diagnosed with chronic pansinusitis 2016     Chronic tonsillitis 1980    Had tonsils removed 02/1981, rheumatic fever     Tom-Danlos disease      Eosinophilic esophagitis 08/2018     Gastroesophageal reflux disease 3/1/2017    I have large hiatal hernia     Hernia, abdominal 10/2016    Hiatal Hernia     Hiatal hernia 2016    Have adeline hiatel hernia     Hoarseness Unsure    It comes and goes     IBS (irritable bowel syndrome) with constipation     improved on Linzess     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     Migraines 1/1/2007    Unsure of exact date     Nasal polyps 2013    Were revoved 03/2017, benign     Other nervous system complications 1/2014    Autonomic nervous system disorder, neuralgia, neuropathy     Pneumonia Unsure    Have had pneumonia several times     Swelling, mass, or  lump in head and neck 08/2016    Lymph node has been growing for last year     Thyroid disease 01/01/2008       Past Surgical History:   Procedure Laterality Date     ADENOIDECTOMY  1981     AS REPAIR OF NASAL SEPTUM       BACK SURGERY  12/09/2013  10/01/2014    Spinal fusion L4-S1, L5 moving 5/8ths in. Remove screws/rods     BIOPSY  01/01/2008 & 3/2017    mole biopsy, lymph node biopsy     COLONOSCOPY  3/2017    Colonoscopy and GI     ESOPHAGOSCOPY, GASTROSCOPY, DUODENOSCOPY (EGD), COMBINED N/A 8/3/2018    Procedure: COMBINED ESOPHAGOSCOPY, GASTROSCOPY, DUODENOSCOPY (EGD), BIOPSY SINGLE OR MULTIPLE;;  Surgeon: Juan Woodson MD;  Location: [facility]     ESOPHAGOSCOPY, GASTROSCOPY, DUODENOSCOPY (EGD), COMBINED N/A 10/22/2018    Procedure: COMBINED ESOPHAGOSCOPY, GASTROSCOPY, DUODENOSCOPY (EGD), BIOPSY SINGLE OR MULTIPLE;  Surgeon: Sadia Carolina MD;  Location: [facility]     ESOPHAGOSCOPY, GASTROSCOPY, DUODENOSCOPY (EGD), COMBINED N/A 12/17/2018    Procedure: COMBINED ESOPHAGOSCOPY, GASTROSCOPY, DUODENOSCOPY (EGD);  Surgeon: Juan Woodson MD;  Location: [facility]     NASAL/SINUS POLYPECTOMY  2017    Polyps were benign     TONSILLECTOMY       TONSILLECTOMY  1981     TUBAL LIGATION         Family History   Problem Relation Age of Onset     Connective Tissue Disorder Mother         eds     Connective Tissue Disorder Sister         eds     Cancer Father         Spinal tumor grew into spinal cord, went in brain     Migraines Father      Diabetes Maternal Grandmother         Elderly diabetes     Heart Disease Maternal Grandmother      Hypertension Maternal Grandmother      Diabetes Paternal Grandmother         Elderly diabetes     Diabetes Paternal Grandfather         Elderly diabetes     Asthma Sister         Pediatric Asthma     Migraines Sister      Asthma Son         Pediatric Asthma     Thyroid Disease Sister         ,     Migraines Sister      Migraines Sister      Breast Cancer No family hx of        Social History      Tobacco Use     Smoking status: Former Smoker     Packs/day: 0.20     Years: 10.00     Pack years: 2.00     Types: Cigarettes     Start date: 1991     Last attempt to quit: 2013     Years since quittin.2     Smokeless tobacco: Never Used     Tobacco comment: I somked on and off during specified dates.   Substance Use Topics     Alcohol use: Yes     Comment: occasional       No current facility-administered medications for this encounter.      Current Outpatient Medications   Medication     amitriptyline (ELAVIL) 10 MG tablet     DULoxetine (CYMBALTA) 30 MG capsule     levothyroxine (SYNTHROID) 25 MCG tablet     linaclotide (LINZESS) 145 MCG capsule     metoclopramide (REGLAN) 10 MG tablet     omeprazole (PRILOSEC) 40 MG capsule     rizatriptan (MAXALT-MLT) 5 MG ODT     ACETAMINOPHEN PO     albuterol (PROAIR HFA/PROVENTIL HFA/VENTOLIN HFA) 108 (90 BASE) MCG/ACT Inhaler     AMITRIPTYLINE HCL PO     [START ON 3/9/2019] buprenorphine (BUTRANS) 10 MCG/HR WK patch     butalbital-acetaminophen-caffeine (FIORICET/ESGIC) -40 MG tablet     cetirizine (ZYRTEC) 10 MG tablet     COMPRESSION STOCKINGS     eletriptan (RELPAX) 20 MG tablet     EPINEPHrine (EPIPEN/ADRENACLICK/OR ANY BX GENERIC EQUIV) 0.3 MG/0.3ML injection 2-pack     famotidine (PEPCID) 20 MG tablet     fluticasone (FLONASE) 50 MCG/ACT spray     fluticasone-salmeterol (ADVAIR DISKUS) 250-50 MCG/DOSE diskus inhaler     medical cannabis (Patient's own supply.  Not a prescription)     Multiple Vitamins-Minerals (MULTIVITAMIN PO)     Nerve Stimulator (CEFALY KIT) KAROLINE     ondansetron (ZOFRAN) 4 MG tablet     ondansetron (ZOFRAN-ODT) 4 MG ODT tab     oxyCODONE IR (ROXICODONE) 5 MG tablet     Probiotic Product (PROBIOTIC DAILY PO)     propranolol ER (INDERAL LA) 120 MG 24 hr capsule     Propranolol HCl SR Beads 120 MG CP24     senna (SENOKOT) 8.6 MG tablet     SUMAtriptan (IMITREX STATDOSE) 6 MG/0.5ML pen injector kit     tiZANidine (ZANAFLEX) 4 MG  tablet     [START ON 3/9/2019] traMADol (ULTRAM) 50 MG tablet     vitamin B complex with vitamin C (VITAMIN  B COMPLEX) TABS tablet     VITAMIN D, CHOLECALCIFEROL, PO     ZONISAMIDE PO        Allergies   Allergen Reactions     Bee Venom Anaphylaxis, Difficulty breathing, Palpitations, Shortness Of Breath and Visual Disturbance     Patient does carry Epi-Pen     No Clinical Screening - See Comments Anaphylaxis     Chicken-Derived Products (Egg) Diarrhea and Nausea     Patient will self monitor  Other reaction(s): Nausea  Patient will self monitor  Other reaction(s): GI intolerance     Compazine [Prochlorperazine] Other (See Comments)     Extreme jittery     Food      Milk     Gabapentin      Gluten Meal Other (See Comments)     Pregabalin Other (See Comments)     Seasonal Allergies Unknown     Dust, mold     Topiramate      Wheat Diarrhea     Wheat Bran Nausea     Patient will self monitor  Other reaction(s): Nausea  Patient will self monitor         Review of Systems   Constitutional: Positive for diaphoresis (with standing). Negative for fever.   HENT: Negative for congestion, rhinorrhea and sore throat.    Respiratory: Negative for cough and shortness of breath.    Cardiovascular: Negative for chest pain.   Gastrointestinal: Positive for nausea and vomiting.   Genitourinary: Negative for dysuria.   Skin: Negative for rash.   Neurological: Positive for light-headedness (with standing up) and headaches (constant). Negative for syncope and weakness.       Physical Exam   BP: 116/85  Heart Rate: 77  Temp: 97.4  F (36.3  C)  Resp: 16  SpO2: 98 %      Physical Exam   GEN:  Well developed, no acute distress  HEENT:  PERRL, EOMI, Mucous membranes are moist.   Cardio:  RRR, no murmur, radial pulses equal bilaterally  PULM:  Lungs clear, good air movement, no wheezes, rales  Abd:  Soft, normal bowel sounds, no focal tenderness  Musculoskeletal:  normal range of motion, no lower extremity swelling or calf tenderness  Neuro:   Alert and oriented X3, Follows commands, CN exam is normal, finger to nose is normal, sensation and strength is normal throughout, no pronator drift   Skin:  Warm, dry      ED Course   6:24 PM  The patient was seen and examined by Dara Cedeño MD in [facility].        Procedures  She was given IV fluids, IV Toradol and IV Reglan for headache and lightheadedness. Her pain decreased to a tolerable level. She continued to have some nausea, so was given IV Zofran.  She still has lightheadedness with standing or sitting.  Her  notes that she has been in bed 95% of the day/night for years.   CT of the head was done and results are shown here:   Preliminary read is negative   Results for orders placed or performed during the hospital encounter of 03/03/19   CT Head w/o Contrast    Narrative    Preliminary    Impression    Impression: No acute intracranial pathology.        Critical Care time:  none    Labs are normal except as shown.    Labs Ordered and Resulted from Time of ED Arrival Up to the Time of Departure from the ED   CBC WITH PLATELETS DIFFERENTIAL   BASIC METABOLIC PANEL            Assessments & Plan (with Medical Decision Making)   Patient presents with headache and reports that she has had chronic daily headaches for the last couple of months and has been essentially bedbound for the last couple of years due to a number of ongoing medical problems.  She uses the Butrans, (buprenorphine) transdermal patch, she is also on Ultram and tizanidine and occasionally takes oxycodone.  She has tried Fioricet in the past as well.  Patient has normal neurologic exam, symptoms are chronic, head CT is negative today, and pain is slightly improved with Toradol and Reglan and IV fluids in the ED.  Patient was advised to follow-up with neurology this week regarding her ongoing symptoms.  It is unfortunate that the blood patch that she received last week does not seem to have helped with her symptoms.  Patient is  afebrile, I do not suspect that this is due to an intracranial infection.  This headache is chronic, was not sudden onset, and I do not suspect acute subarachnoid hemorrhage.  Headache is still present at the time of discharge, but is improved.  Her and her  are comfortable going home tonight and will follow-up with neurology tomorrow.    I have reviewed the nursing notes.    I have reviewed the findings, diagnosis, plan and need for follow up with the patient.       Medication List      ASK your doctor about these medications    galcanezumab-gnlm 120 MG/ML injection  Commonly known as:  EMGALITY  240 mg, Subcutaneous, ONCE  Ask about: Should I take this medication?            Final diagnoses:   Chronic daily headache     I, Felix Edgar, am serving as a trained medical scribe to document services personally performed by Dara Cedeño MD, based on the provider's statements to me.   IDara MD, was physically present and have reviewed and verified the accuracy of this note documented by Felix Edgar.    3/3/2019   [facility], [place], EMERGENCY DEPARTMENT     Dara Cedeño MD  03/04/19 0017

## 2019-03-04 NOTE — DISCHARGE INSTRUCTIONS
Please make an appointment to follow up with your neurology clinic as soon as possible for continued care.

## 2019-03-06 DIAGNOSIS — M96.1 POSTLAMINECTOMY SYNDROME OF LUMBAR REGION: ICD-10-CM

## 2019-03-06 RX ORDER — BUPRENORPHINE 10 UG/H
1 PATCH TRANSDERMAL
Qty: 4 PATCH | Refills: 2 | Status: CANCELLED | OUTPATIENT
Start: 2019-03-09

## 2019-03-07 NOTE — TELEPHONE ENCOUNTER
Pt was informed the recommendation as below.  Pt states that she takes Fioricet 1 tab/day, several times per month. She does not need refills now. Will hold till 3/14/19.

## 2019-04-02 NOTE — PROGRESS NOTES
[facility]  Primary Care Center   Ellyn Rivera MD  04/04/2019      Chief Complaint:   Recheck Medication and Forms       History of Present Illness:   Ada Welch is a 48 year old female with a history of Tom Danlos syndrome and obesity who presents for a follow up for a thoracic surgery and medication/forms recheck.    The patient reports after her episodes of chocking on food she felt that something was not right. She started experiencing dizziness, sensations of passing out, and seeing stars whenever she gets up from a seated position. She subsequently got a blood patch that completely resolved her symptoms. She still experiences chronic residual neck pain that is not a big concern.     She still has not heard back from her thoracic surgeon in regard to her hiatal hernia surgery. She recently had a motility test performed to see whether this surgery is recommended.     She sees Dr. Martinez in the pain clinic who recommends that she does not get any injections at this time due to her severe blood pressure fluctuation and minimal relief. She reports the time she received an injection for a occipital nerve block her blood pressure dropped to 60/40.      She is not on any new medication. She is still using Fioricet PRN that helps resolve some her headache symptoms. She is still using medical cannibals, but notes that she has cut her daily dosage in half recently due to improved symptoms.     She has a scheduled visit with Dr. Barahona in ENT for a new problem in her nose. She has constant sinus infections and believes there is subsequent scar tissue forming . She is also noticing recent sore formation in her nose. She also have very bad dry mouth secondary to her medications. She has gotten more cavities as a result of the dry mouth. She has been seen for dry mouth where she received Biotene and prescription toothpaste that have helped reduce her symptoms.     The patient has won her disability case. She  states that she can only do physical activity for two hours before she needs to rest. This is worse in the winter. She has modified her activities to involve more rest during these months. She also expresses interest in obtaining handicap license plates.      Detailed Medical History:  -EDS-Type 3 Hypermobility. 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 He reports having a Beighton score of 7-8 out of 9. She reports having genetic testing done, which was questionable for the MYLK Gene. She follows with a rheumatologist who is an EDS specialist in [place].  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 Russell, has ILR.  -Cervical instability, chiari malformation:  Saw Dr. Moncada, cervical fusion not recommended, considering occipital blocks. Also traveled to [facility] to see Dr. Andrey Sofia.  -Chronic headaches: Daily tension, cervicogenic, migraine. Has not responded well to injections. Hospitalized in 2017 with normal LP and MRI. At that time responded to DHA.  Had blood patch done 3/19 with good relief of pain. Previously failed amovig and emgality.  -Hiatal hernia: Recently saw Dr. Mack and had manometry testing done in 2019. Considering laparoscopic repair.  -Eosinophilic esophagitis  -Lumbar pain: She underwent L4 to S1 fusion in 2013 followed by removal of instrumentation, which did not improve her symptoms. She met with Dr. Martinez in the pain clinic and was taken off high doses of Dilaudid and started on Suboxone in 2017, which dramatically improved her symptoms.  She continues to work with physical therapy twice a week as well as other complementary therapies.   -Chronic Pain: On suboxone per above, as well as occasional oxycodone and ultram. Uses medical cannabis since 2018 which helps.  -Asthma  -Enlarged cervical lymph nodes: Biopsied in pas and benign.  -Urinary urge incontinence/incomplete emptying  -Pelvic floor dysfunction        Review of Systems:   Pertinent items are  noted in HPI, remainder of complete ROS is negative.      Active Medications:     Current Outpatient Medications:      ACETAMINOPHEN PO, Take 1,000 mg by mouth every 6 hours as needed for pain Take with tramadol, Disp: , Rfl:      albuterol (PROAIR HFA/PROVENTIL HFA/VENTOLIN HFA) 108 (90 BASE) MCG/ACT Inhaler, Inhale 2 puffs into the lungs every 6 hours as needed , Disp: , Rfl:      amitriptyline (ELAVIL) 10 MG tablet, TAKE 3 TABLETS BY MOUTH AT BEDTIME, Disp: 270 tablet, Rfl: 3     AMITRIPTYLINE HCL PO, Take 50 mg by mouth At Bedtime , Disp: , Rfl:      buprenorphine (BUTRANS) 10 MCG/HR WK patch, Place 1 patch onto the skin every 7 days, Disp: 4 patch, Rfl: 2     butalbital-acetaminophen-caffeine (FIORICET/ESGIC) -40 MG tablet, TAKE ONE TABLET BY MOUTH EVERY 4 HOURS AS NEEDED. MAX 10 PER MONTH., Disp: 10 tablet, Rfl: 0     cetirizine (ZYRTEC) 10 MG tablet, Take 10 mg by mouth daily , Disp: , Rfl:      COMPRESSION STOCKINGS, 1 each daily 20-30 mmHg Thigh Length measure and fit, color and style per patient preference. Doff n jose per need., Disp: 3 each, Rfl: 3     DULoxetine (CYMBALTA) 30 MG capsule, Take 60mg in the morning and 30mg at bedtime, Disp: 270 capsule, Rfl: 1     eletriptan (RELPAX) 20 MG tablet, Take 1 tablet (20 mg) by mouth at onset of headache for migraine May repeat in 2 hours. Max 4 tablets/24 hours., Disp: 18 tablet, Rfl: 1     EPINEPHrine (EPIPEN/ADRENACLICK/OR ANY BX GENERIC EQUIV) 0.3 MG/0.3ML injection 2-pack, Inject 0.3 mg into the muscle, Disp: , Rfl:      famotidine (PEPCID) 20 MG tablet, Take 1 tablet (20 mg) by mouth At Bedtime, Disp: 90 tablet, Rfl: 1     fluticasone (FLONASE) 50 MCG/ACT spray, INSTILL 2 SPRAYS INTO BOTH NOSTRILS DAILY, Disp: 48 mL, Rfl: 3     fluticasone-salmeterol (ADVAIR DISKUS) 250-50 MCG/DOSE diskus inhaler, Inhale 1 puff into the lungs 2 times daily as needed , Disp: , Rfl:      levothyroxine (SYNTHROID) 25 MCG tablet, Take 1 tablet (25 mcg) by mouth daily,  Disp: 90 tablet, Rfl: 3     linaclotide (LINZESS) 145 MCG capsule, Take 1 capsule (145 mcg) by mouth every morning (before breakfast) Last refill until GI clinic follow up., Disp: 90 capsule, Rfl: 2     medical cannabis (Patient's own supply.  Not a prescription), Take 1 Dose by mouth See Admin Instructions (This is NOT a prescription, and does not certify that the patient has a qualifying medical condition for medical cannabis.  The purpose of this order is  to document that the patient reports taking medical cannabis.), Disp: , Rfl:      metoclopramide (REGLAN) 10 MG tablet, Take 1 tablet (10 mg) by mouth 4 times daily (before meals and nightly), Disp: 40 tablet, Rfl: 3     Multiple Vitamins-Minerals (MULTIVITAMIN PO), Take 1 tablet by mouth daily , Disp: , Rfl:      Nerve Stimulator (CEFALY KIT) KAROLINE, 1 Device daily as needed, Disp: 1 Device, Rfl: 0     omeprazole (PRILOSEC) 40 MG capsule, Take 1 capsule (40 mg) by mouth 2 times daily as needed (prn), Disp: 180 capsule, Rfl: 2     ondansetron (ZOFRAN) 4 MG tablet, Take 4 mg by mouth, Disp: , Rfl:      ondansetron (ZOFRAN-ODT) 4 MG ODT tab, Take 1-2 tablets (4-8 mg) by mouth every 12 hours as needed for nausea, Disp: 60 tablet, Rfl: 1     oxyCODONE IR (ROXICODONE) 5 MG tablet, Take 0.5 tablets (2.5 mg) by mouth every 6 hours as needed for severe pain, Disp: 15 tablet, Rfl: 0     Probiotic Product (PROBIOTIC DAILY PO), Take 2 packets by mouth every morning , Disp: , Rfl:      propranolol ER (INDERAL LA) 120 MG 24 hr capsule, Take 120 mg by mouth daily, Disp: , Rfl: 11     Propranolol HCl SR Beads 120 MG CP24, Take 120 mg by mouth daily, Disp: 30 capsule, Rfl: 11     rizatriptan (MAXALT-MLT) 5 MG ODT, TAKE 1-2 TABLETS (5-10 MG) BY MOUTH AT ONSET OF HEADACHE FOR MIGRAINE (MAX 30 MG IN 24 HOURS), Disp: 9 tablet, Rfl: 11     senna (SENOKOT) 8.6 MG tablet, Take 17.2-34.4 mg by mouth, Disp: , Rfl:      SUMAtriptan (IMITREX STATDOSE) 6 MG/0.5ML pen injector kit, Inject  0.5 ml (6 mg) subcutaneously at onset of migraine, May repeat in 1 hour , Max 12 mg/24 hrs, Disp: 2 kit, Rfl: 1     tiZANidine (ZANAFLEX) 4 MG tablet, Take 1 tablet (4 mg) by mouth 4 times daily as needed for muscle spasms, Disp: 360 tablet, Rfl: 1     traMADol (ULTRAM) 50 MG tablet, Take 1 tablet (50 mg) by mouth every 6 hours as needed for severe pain, Disp: 60 tablet, Rfl: 1     vitamin B complex with vitamin C (VITAMIN  B COMPLEX) TABS tablet, Take 1 tablet by mouth daily, Disp: , Rfl:      VITAMIN D, CHOLECALCIFEROL, PO, Take 2,000 Units by mouth daily, Disp: , Rfl:      ZONISAMIDE PO, Take 75 mg by mouth daily, Disp: , Rfl:       Allergies:   Bee venom; No clinical screening - see comments; Chicken-derived products (egg); Compazine [prochlorperazine]; Food; Gabapentin; Gluten meal; Pregabalin; Seasonal allergies; Topiramate; Wheat; and Wheat bran      Past Medical History:  Allergic rhinitis  Hiatal hernia   IBS  Nasal polyps   Thyroid disease    Tom Danlos syndrome  Dysuria   Hepatomegaly  Lumbar radiculopathy  Lymphadenopathy of left cervical region   Obesity   Presbyopia  spondylolisthesis of lumbar region    sinus tachycardia   Cervicalgia   acquired hypothyroidism   Astigmatism   Cyst of fallopian tube      Past Surgical History:  Adenoidectomy   Spinal fusion L4-S1, L5 moving 5/8  Polypectomy, nasal/sinus    Family History:   Connective tissue disorder - mother, sister  Spinal tumor grew into spinal cord and into brain - father   Elderly diabetes - maternal grandmother, paternal grandfather   Thyroid disease - sister    Hypertension - maternal grandmother      Social History:   Patient returns to clinic alone   Former smoker (quit on 1/1/1991)  Admits to occasional alcohol use  Uses medical marijuana      Physical Exam:   BP 92/63   Pulse 81   SpO2 98%    Constitutional: Alert, oriented, pleasant, no acute distress, well appearing   Head: Normocephalic, atraumatic  Eyes: Extra-ocular movements  intact, no scleral icterus  ENT: Oropharynx clear, moist mucus membranes, good dentition  Neck: Supple, no lymphadenopathy, no thyromegaly   Cardiovascular: Regular rate and rhythm, no murmurs, rubs or gallops, peripheral pulses full/symmetric  Respiratory: Good air movement bilaterally, lungs clear, no wheezes/rales/rhonchi  Musculoskeletal: No edema, normal muscle tone, normal gait  Neurologic: Alert and oriented, cranial nerves 2-12 intact.  Psychiatric: normal mentation, affect and mood    Assessment and Plan:  Intractable chronic migraine without aura and without status migrainosus  She has a complex headache history involving both chronic migraines and tension headaches as well as cervicogenic headaches. She follows in neurology pain clinic and PMR clinic. Interestingly she had a blood patch placed recently and this has nearly resolved her daily headache. Her other headache med remain unchanged. She apparently did not experience relief with Amovig or Emgality.    Pharyngeal dysphagia  Recently saw thoracic surgery and had manometry testing performed she is awaiting to hear back whether hiatal hernia surgery is recommended.     Hiatal hernia  See above.    Dry mouth  Secondary to medications symptoms are controlled on biotene and prescription toothpaste.      Routine Health Maintenence:  Depression Screening:   PHQ-2 ( 1999 Pfizer) 2/14/2019 11/8/2018   Q1: Little interest or pleasure in doing things 0 0   Q2: Feeling down, depressed or hopeless 0 0   PHQ-2 Score 0 0   Q1: Little interest or pleasure in doing things Not at all Not at all   Q2: Feeling down, depressed or hopeless Not at all Not at all   PHQ-2 Score 0 0     Immunizations (zoster, pneumovax, flu, Tdap, Hep A/B):   There is no immunization history on file for this patient.  Lipids:   Recent Labs   Lab Test 04/26/18  1010   CHOL 244*   HDL 48*   *   TRIG 156*     Lung Ca Screening (>30 pk age 55-79 or >20 py age 50-79 + RF): n/a low pack  years  Colonoscopy (50-75 yrs): 3/17   - One small hyperplastic polyp in the sigmoid colon, removed with a cold snare. Resected and retrieved. Diverticulosis in the sigmoid colon. Repeat colonoscopy in10 years for surveillance   Dexa (>65W or 70M yrs): n/a  Mammogram (40-75 yrs): 1/18 ordered  Pap (21-65 yrs): ?  HIV/HCV if risk factors: ?  Tob/EtOH: screen neg  Lifestyle factors: reviewed  Advanced Directive: deferred    Follow-up: Return in about 6 months (around 10/4/2019) for Routine Visit.     Scribe Disclosure:  I, Dalton Slade, am serving as a scribe to document services personally performed by Ellyn Rivera MD at this visit, based upon the provider's statements to me. All documentation has been reviewed by the aforementioned provider prior to being entered into the official medical record.     Portions of this medical record were completed by a scribe. UPON MY REVIEW AND AUTHENTICATION BY ELECTRONIC SIGNATURE, this confirms (a) I performed the applicable clinical services, and (b) the record is accurate.   Ellyn Rivera MD  Internal Medicine

## 2019-04-04 ENCOUNTER — OFFICE VISIT (OUTPATIENT)
Dept: INTERNAL MEDICINE | Facility: CLINIC | Age: 49
End: 2019-04-04
Payer: COMMERCIAL

## 2019-04-04 VITALS — OXYGEN SATURATION: 98 % | SYSTOLIC BLOOD PRESSURE: 92 MMHG | DIASTOLIC BLOOD PRESSURE: 63 MMHG | HEART RATE: 81 BPM

## 2019-04-04 DIAGNOSIS — R68.2 DRY MOUTH: ICD-10-CM

## 2019-04-04 DIAGNOSIS — G43.719 INTRACTABLE CHRONIC MIGRAINE WITHOUT AURA AND WITHOUT STATUS MIGRAINOSUS: Primary | ICD-10-CM

## 2019-04-04 DIAGNOSIS — R13.13 PHARYNGEAL DYSPHAGIA: ICD-10-CM

## 2019-04-04 DIAGNOSIS — K44.9 HIATAL HERNIA: ICD-10-CM

## 2019-04-04 NOTE — NURSING NOTE
Chief Complaint   Patient presents with     Recheck Medication     Pt is here for a follow up regarding thoracic surgery referral     Forms     Pt needs disability plates form signed by physician       Hope Nixon, Clinic EMT at 8:48 AM on 4/4/2019

## 2019-04-04 NOTE — PROGRESS NOTES
Detailed Medical History:  -EDS-Type 3 Hypermobility. 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 He reports having a Beighton score of 7-8 out of 9. She reports having genetic testing done, which was questionable for the MYLK Gene. She follows with a rheumatologist who is an EDS specialist in [place].  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 Russell, has ILR.  -Cervical instability, chiari malformation:  Saw Dr. Moncada, cervical fusion not recommended, considering occipital blocks. Also traveled to [facility] discontinue to see Dr. Andrey Sofia.  -Chronic headaches: Daily tension, cervicogenic, migraine. Has not responded well to injections. Hospitalized in 2017 with normal LP and MRI. At that time responded to DHA.  Had blood patch done 3/19 with good relief of pain. Previously failed amovig and emgality.  -Hiatal hernia: Recently saw Dr. Makc and had manometry testing done in 2019. Considering laparoscopic repair.  -Eosinophilic esophagitis  -Lumbar pain: She underwent L4 to S1 fusion in 2013 followed by removal of instrumentation, which did not improve her symptoms. She met with Dr. Martinez in the pain clinic and was taken off high doses of Dilaudid and started on Suboxone in 2017, which dramatically improved her symptoms.  She continues to work with physical therapy twice a week as well as other complementary therapies.   -Chronic Pain: On suboxone per above, as well as occasional oxycodone and ultram. Uses medical cannabis since 2018 which helps.  -Asthma  -Enlarged cervical lymph nodes: Biopsied in pas and benign.  -Urinary urge incontinence/incomplete emptying  -Pelvic floor dysfunction    #Routine Health Maintenence:  Depression Screening:   PHQ-2 ( 1999 Pfizer) 2/14/2019 11/8/2018   Q1: Little interest or pleasure in doing things 0 0   Q2: Feeling down, depressed or hopeless 0 0   PHQ-2 Score 0 0   Q1: Little interest or pleasure in doing things Not at all Not at all   Q2: Feeling down,  depressed or hopeless Not at all Not at all   PHQ-2 Score 0 0     Immunizations (zoster, pneumovax, flu, Tdap, Hep A/B):   There is no immunization history on file for this patient.  Lipids:   Recent Labs   Lab Test 04/26/18  1010   CHOL 244*   HDL 48*   *   TRIG 156*     Lung Ca Screening (>30 pk age 55-79 or >20 py age 50-79 + RF): n/a low pack years  Colonoscopy (50-75 yrs): 3/17   - One small hyperplastic polyp in the sigmoid colon, removed with a cold snare. Resected and retrieved. Diverticulosis in the sigmoid colon. Repeat colonoscopy in10 years for surveillance   Dexa (>65W or 70M yrs): n/a  Mammogram (40-75 yrs): 1/18 ordered  Pap (21-65 yrs): ?  HIV/HCV if risk factors: ?  Tob/EtOH: screen neg  Lifestyle factors: reviewed  Advanced Directive: deferred

## 2019-04-04 NOTE — PATIENT INSTRUCTIONS
Primary Care Center Phone Number: [phone]   Primary Care Center Medication Refill Request Information:  * Please contact your pharmacy regarding ANY request for medication refills.  ** [facility] Prescription Fax = [phone]  * Please allow 3 business days for routine medication refills.  * Please allow 5 business days for controlled substance medication refills.     [facility] Test Result notification information:  *You will be notified with in 7-10 days of your appointment day regarding the results of your test.  If you are on MyChart you will be notified as soon as the provider has reviewed the results and signed off on them.

## 2019-04-18 ENCOUNTER — TELEPHONE (OUTPATIENT)
Dept: PHYSICAL MEDICINE AND REHAB | Facility: CLINIC | Age: 49
End: 2019-04-18

## 2019-04-18 ENCOUNTER — TELEPHONE (OUTPATIENT)
Dept: GASTROENTEROLOGY | Facility: CLINIC | Age: 49
End: 2019-04-18

## 2019-04-18 NOTE — TELEPHONE ENCOUNTER
[facility] Call Center    Phone Message    May a detailed message be left on voicemail: yes    Reason for Call: Other: Pt had to cancel her Appt this morning with Pastora Fraga MD - Pt unable to move this morning - cancelled Appt - please return Pt call to reschedule that Appt please - Thanks     Action Taken: Message routed to:  [facility] ([facility]): PM&R

## 2019-04-22 ENCOUNTER — TELEPHONE (OUTPATIENT)
Dept: GASTROENTEROLOGY | Facility: CLINIC | Age: 49
End: 2019-04-22

## 2019-04-23 ENCOUNTER — TELEPHONE (OUTPATIENT)
Dept: GASTROENTEROLOGY | Facility: CLINIC | Age: 49
End: 2019-04-23

## 2019-05-06 ENCOUNTER — OFFICE VISIT (OUTPATIENT)
Dept: PULMONOLOGY | Facility: CLINIC | Age: 49
End: 2019-05-06
Attending: ALLERGY & IMMUNOLOGY
Payer: COMMERCIAL

## 2019-05-06 VITALS
BODY MASS INDEX: 32.65 KG/M2 | WEIGHT: 208 LBS | SYSTOLIC BLOOD PRESSURE: 123 MMHG | HEIGHT: 67 IN | RESPIRATION RATE: 16 BRPM | DIASTOLIC BLOOD PRESSURE: 85 MMHG | HEART RATE: 60 BPM | OXYGEN SATURATION: 99 %

## 2019-05-06 DIAGNOSIS — J32.0 CHRONIC MAXILLARY SINUSITIS: Primary | ICD-10-CM

## 2019-05-06 PROCEDURE — 95004 PERQ TESTS W/ALRGNC XTRCS: CPT

## 2019-05-06 PROCEDURE — G0463 HOSPITAL OUTPT CLINIC VISIT: HCPCS | Mod: 25,ZF

## 2019-05-06 ASSESSMENT — PAIN SCALES - GENERAL: PAINLEVEL: NO PAIN (0)

## 2019-05-06 ASSESSMENT — MIFFLIN-ST. JEOR: SCORE: 1611.11

## 2019-05-06 NOTE — LETTER
5/6/2019       RE: Ada Welch  [address]  [place] [address]     Dear Colleague,    Thank you for referring your patient, Ada Welch, to the [facility] at [facility]. Please see a copy of my visit note below.    Reason for Visit  Ada Welch is a 48 year old female who is here for follow-up for allergies.    Allergy HPI  She is doing OK, did a spinal infusion of her blood and that helped her migraines.  Always congestion and runny nose, crusty eyes.  On zyrtec 10 mg and flonase 2 sprays in each nostril.   Breathing is OK most of the time. No inhalers.       Social: 2 Dogs.        The patient was seen and examined by Vahe Álvarez MD   Current Outpatient Medications   Medication     ACETAMINOPHEN PO     albuterol (PROAIR HFA/PROVENTIL HFA/VENTOLIN HFA) 108 (90 BASE) MCG/ACT Inhaler     amitriptyline (ELAVIL) 10 MG tablet     AMITRIPTYLINE HCL PO     buprenorphine (BUTRANS) 10 MCG/HR WK patch     butalbital-acetaminophen-caffeine (FIORICET/ESGIC) -40 MG tablet     cetirizine (ZYRTEC) 10 MG tablet     COMPRESSION STOCKINGS     DULoxetine (CYMBALTA) 30 MG capsule     eletriptan (RELPAX) 20 MG tablet     EPINEPHrine (EPIPEN/ADRENACLICK/OR ANY BX GENERIC EQUIV) 0.3 MG/0.3ML injection 2-pack     famotidine (PEPCID) 20 MG tablet     fluticasone (FLONASE) 50 MCG/ACT spray     fluticasone-salmeterol (ADVAIR DISKUS) 250-50 MCG/DOSE diskus inhaler     levothyroxine (SYNTHROID) 25 MCG tablet     linaclotide (LINZESS) 145 MCG capsule     medical cannabis (Patient's own supply.  Not a prescription)     metoclopramide (REGLAN) 10 MG tablet     Multiple Vitamins-Minerals (MULTIVITAMIN PO)     Nerve Stimulator (CEFALY KIT) KAROLINE     omeprazole (PRILOSEC) 40 MG capsule     ondansetron (ZOFRAN) 4 MG tablet     ondansetron (ZOFRAN-ODT) 4 MG ODT tab     oxyCODONE IR (ROXICODONE) 5 MG tablet     Probiotic Product (PROBIOTIC DAILY  PO)     propranolol ER (INDERAL LA) 120 MG 24 hr capsule     Propranolol HCl SR Beads 120 MG CP24     rizatriptan (MAXALT-MLT) 5 MG ODT     senna (SENOKOT) 8.6 MG tablet     SUMAtriptan (IMITREX STATDOSE) 6 MG/0.5ML pen injector kit     tiZANidine (ZANAFLEX) 4 MG tablet     traMADol (ULTRAM) 50 MG tablet     vitamin B complex with vitamin C (VITAMIN  B COMPLEX) TABS tablet     VITAMIN D, CHOLECALCIFEROL, PO     ZONISAMIDE PO     No current facility-administered medications for this visit.      Allergies   Allergen Reactions     Bee Venom Anaphylaxis, Difficulty breathing, Palpitations, Shortness Of Breath and Visual Disturbance     Patient does carry Epi-Pen     No Clinical Screening - See Comments Anaphylaxis     Chicken-Derived Products (Egg) Diarrhea and Nausea     Patient will self monitor  Other reaction(s): Nausea  Patient will self monitor  Other reaction(s): GI intolerance     Compazine [Prochlorperazine] Other (See Comments)     Extreme jittery     Food      Milk     Gabapentin      Gluten Meal Other (See Comments)     Pregabalin Other (See Comments)     Seasonal Allergies Unknown     Dust, mold     Topiramate      Wheat Diarrhea     Wheat Bran Nausea     Patient will self monitor  Other reaction(s): Nausea  Patient will self monitor     Social History     Socioeconomic History     Marital status:      Spouse name: Carry     Number of children: 5     Years of education: Not on file     Highest education level: Not on file   Occupational History     Not on file   Social Needs     Financial resource strain: Not on file     Food insecurity:     Worry: Not on file     Inability: Not on file     Transportation needs:     Medical: Not on file     Non-medical: Not on file   Tobacco Use     Smoking status: Former Smoker     Packs/day: 0.20     Years: 10.00     Pack years: 2.00     Types: Cigarettes     Start date: 1991     Last attempt to quit: 2013     Years since quittin.4     Smokeless  tobacco: Never Used     Tobacco comment: I somked on and off during specified dates.   Substance and Sexual Activity     Alcohol use: Yes     Comment: occasional     Drug use: Yes     Types: Marijuana     Comment: medical marijuana     Sexual activity: Yes     Partners: Male     Birth control/protection: Female Surgical   Lifestyle     Physical activity:     Days per week: Not on file     Minutes per session: Not on file     Stress: Not on file   Relationships     Social connections:     Talks on phone: Not on file     Gets together: Not on file     Attends Yazdanism service: Not on file     Active member of club or organization: Not on file     Attends meetings of clubs or organizations: Not on file     Relationship status: Not on file     Intimate partner violence:     Fear of current or ex partner: Not on file     Emotionally abused: Not on file     Physically abused: Not on file     Forced sexual activity: Not on file   Other Topics Concern     Parent/sibling w/ CABG, MI or angioplasty before 65F 55M? 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     Chronic sinusitis 00/2007    Diagnosed with chronic pansinusitis 2016     Chronic tonsillitis 1980    Had tonsils removed 02/1981, rheumatic fever     Tom-Danlos disease      Eosinophilic esophagitis 08/2018     Gastroesophageal reflux disease 3/1/2017    I have large hiatal hernia     Hernia, abdominal 10/2016    Hiatal Hernia     Hiatal hernia 2016    Have adeline hiatel hernia     Hoarseness Unsure    It comes and goes     IBS (irritable bowel syndrome) with constipation      improved on Linzess     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 Remove screws/rods     BIOPSY  01/01/2008 & 3/2017    mole biopsy, lymph node biopsy     COLONOSCOPY  3/2017    Colonoscopy and GI     ESOPHAGOSCOPY, GASTROSCOPY, DUODENOSCOPY (EGD), COMBINED N/A 8/3/2018    Procedure: COMBINED ESOPHAGOSCOPY, GASTROSCOPY, DUODENOSCOPY (EGD), BIOPSY SINGLE OR MULTIPLE;;  Surgeon: Juan Woodson MD;  Location:  GI     ESOPHAGOSCOPY, GASTROSCOPY, DUODENOSCOPY (EGD), COMBINED N/A 10/22/2018    Procedure: COMBINED ESOPHAGOSCOPY, GASTROSCOPY, DUODENOSCOPY (EGD), BIOPSY SINGLE OR MULTIPLE;  Surgeon: Sadia Carolina MD;  Location:  GI     ESOPHAGOSCOPY, GASTROSCOPY, DUODENOSCOPY (EGD), COMBINED N/A 12/17/2018    Procedure: COMBINED ESOPHAGOSCOPY, GASTROSCOPY, DUODENOSCOPY (EGD);  Surgeon: Juan Woodson MD;  Location:  GI     NASAL/SINUS POLYPECTOMY  2017    Polyps were benign     TONSILLECTOMY       TONSILLECTOMY  1981     TUBAL LIGATION       Family History   Problem Relation Age of Onset     Connective Tissue Disorder Mother         eds     Connective Tissue Disorder Sister         eds     Cancer Father         Spinal tumor grew into spinal cord, went in brain     Migraines Father      Diabetes Maternal Grandmother         Elderly diabetes     Heart Disease Maternal Grandmother      Hypertension Maternal  "Grandmother      Diabetes Paternal Grandmother         Elderly diabetes     Diabetes Paternal Grandfather         Elderly diabetes     Asthma Sister         Pediatric Asthma     Migraines Sister      Asthma Son         Pediatric Asthma     Thyroid Disease Sister         ,     Migraines Sister      Migraines Sister      Breast Cancer No family hx of          ROS   A complete ROS was otherwise negative except as noted in the HPI.  /85 (BP Location: Right arm, Patient Position: Chair, Cuff Size: Adult Large)   Pulse 60   Resp 16   Ht 1.702 m (5' 7\")   Wt 94.3 kg (208 lb)   SpO2 99%   BMI 32.58 kg/m     Exam:   GENERAL APPEARANCE: Well developed, well nourished, alert, and in no apparent distress.  EYES: EOMI, conjunctiva clear non-injected  HENT: Nasal mucosa with no edema and no discharge. No nasal polyps.    EARS: Canals clear, TMs normal.  MOUTH: Oral mucosa is moist, without any lesions, no tonsillar enlargement, no oropharyngeal exudate.  RESP: Good air flow throughout.  No crackles. No rhonchi. No wheezes.  CV: Normal S1, S2, regular rhythm, normal rate. No murmur.  No rub. No gallop. No LE edema.   MS: Extremities normal. No clubbing. No cyanosis.  SKIN: No rashes noted.  NEURO: Speech normal, normal strength and tone, normal gait and stance  PSYCH: Normal mentation, orientation to person, place, and time.  Results: 43 percutaneous and 9 intradermal environmental allergen (and 2 controls) extracts placed by MA and read by MD.  All negative.      Assessment and plan: Ada has frequent nasal symptoms and IgE mediated skin testing to environmental allergens was completely negative.  These do not appear to be allergic.  I recommend she stop the zyrtec.  Can continue the nasal steroid (fluticasone) if it is helping. She notes that the zyrtec helps (initially she said it didn't help) it may be drying her out and hence a side effect may be helping her.  She asked about food testing and our tests to foods " would not show a potential link with her stomach pains and sinus pressure.  She asked about mast cell activation and I told her that there is no solid diagnosis or treatment or this and I am not convinced it exists.  F/U prn    Again, thank you for allowing me to participate in the care of your patient.      Sincerely,    Vahe Álvarez MD

## 2019-05-06 NOTE — PROGRESS NOTES
Reason for Visit  Ada Welch is a 48 year old female who is here for follow-up for allergies.    Allergy HPI  She is doing OK, did a spinal infusion of her blood and that helped her migraines.  Always congestion and runny nose, crusty eyes.  On zyrtec 10 mg and flonase 2 sprays in each nostril.   Breathing is OK most of the time. No inhalers.       Social: 2 Dogs.        The patient was seen and examined by Vahe Álvarez MD   Current Outpatient Medications   Medication     ACETAMINOPHEN PO     albuterol (PROAIR HFA/PROVENTIL HFA/VENTOLIN HFA) 108 (90 BASE) MCG/ACT Inhaler     amitriptyline (ELAVIL) 10 MG tablet     AMITRIPTYLINE HCL PO     buprenorphine (BUTRANS) 10 MCG/HR WK patch     butalbital-acetaminophen-caffeine (FIORICET/ESGIC) -40 MG tablet     cetirizine (ZYRTEC) 10 MG tablet     COMPRESSION STOCKINGS     DULoxetine (CYMBALTA) 30 MG capsule     eletriptan (RELPAX) 20 MG tablet     EPINEPHrine (EPIPEN/ADRENACLICK/OR ANY BX GENERIC EQUIV) 0.3 MG/0.3ML injection 2-pack     famotidine (PEPCID) 20 MG tablet     fluticasone (FLONASE) 50 MCG/ACT spray     fluticasone-salmeterol (ADVAIR DISKUS) 250-50 MCG/DOSE diskus inhaler     levothyroxine (SYNTHROID) 25 MCG tablet     linaclotide (LINZESS) 145 MCG capsule     medical cannabis (Patient's own supply.  Not a prescription)     metoclopramide (REGLAN) 10 MG tablet     Multiple Vitamins-Minerals (MULTIVITAMIN PO)     Nerve Stimulator (CEFALY KIT) KAROLINE     omeprazole (PRILOSEC) 40 MG capsule     ondansetron (ZOFRAN) 4 MG tablet     ondansetron (ZOFRAN-ODT) 4 MG ODT tab     oxyCODONE IR (ROXICODONE) 5 MG tablet     Probiotic Product (PROBIOTIC DAILY PO)     propranolol ER (INDERAL LA) 120 MG 24 hr capsule     Propranolol HCl SR Beads 120 MG CP24     rizatriptan (MAXALT-MLT) 5 MG ODT     senna (SENOKOT) 8.6 MG tablet     SUMAtriptan (IMITREX STATDOSE) 6 MG/0.5ML pen injector kit     tiZANidine (ZANAFLEX) 4 MG tablet     traMADol (ULTRAM) 50 MG  tablet     vitamin B complex with vitamin C (VITAMIN  B COMPLEX) TABS tablet     VITAMIN D, CHOLECALCIFEROL, PO     ZONISAMIDE PO     No current facility-administered medications for this visit.      Allergies   Allergen Reactions     Bee Venom Anaphylaxis, Difficulty breathing, Palpitations, Shortness Of Breath and Visual Disturbance     Patient does carry Epi-Pen     No Clinical Screening - See Comments Anaphylaxis     Chicken-Derived Products (Egg) Diarrhea and Nausea     Patient will self monitor  Other reaction(s): Nausea  Patient will self monitor  Other reaction(s): GI intolerance     Compazine [Prochlorperazine] Other (See Comments)     Extreme jittery     Food      Milk     Gabapentin      Gluten Meal Other (See Comments)     Pregabalin Other (See Comments)     Seasonal Allergies Unknown     Dust, mold     Topiramate      Wheat Diarrhea     Wheat Bran Nausea     Patient will self monitor  Other reaction(s): Nausea  Patient will self monitor     Social History     Socioeconomic History     Marital status:      Spouse name: Carry     Number of children: 5     Years of education: Not on file     Highest education level: Not on file   Occupational History     Not on file   Social Needs     Financial resource strain: Not on file     Food insecurity:     Worry: Not on file     Inability: Not on file     Transportation needs:     Medical: Not on file     Non-medical: Not on file   Tobacco Use     Smoking status: Former Smoker     Packs/day: 0.20     Years: 10.00     Pack years: 2.00     Types: Cigarettes     Start date: 1991     Last attempt to quit: 2013     Years since quittin.4     Smokeless tobacco: Never Used     Tobacco comment: I somked on and off during specified dates.   Substance and Sexual Activity     Alcohol use: Yes     Comment: occasional     Drug use: Yes     Types: Marijuana     Comment: medical marijuana     Sexual activity: Yes     Partners: Male     Birth control/protection:  Female Surgical   Lifestyle     Physical activity:     Days per week: Not on file     Minutes per session: Not on file     Stress: Not on file   Relationships     Social connections:     Talks on phone: Not on file     Gets together: Not on file     Attends Christian service: Not on file     Active member of club or organization: Not on file     Attends meetings of clubs or organizations: Not on file     Relationship status: Not on file     Intimate partner violence:     Fear of current or ex partner: Not on file     Emotionally abused: Not on file     Physically abused: Not on file     Forced sexual activity: Not on file   Other Topics Concern     Parent/sibling w/ CABG, MI or angioplasty before 65F 55M? 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     Chronic sinusitis 00/2007    Diagnosed with chronic pansinusitis 2016     Chronic tonsillitis 1980    Had tonsils removed 02/1981, rheumatic fever     Tom-Danlos disease      Eosinophilic esophagitis 08/2018     Gastroesophageal reflux disease 3/1/2017    I have large hiatal hernia     Hernia, abdominal 10/2016    Hiatal Hernia     Hiatal hernia 2016    Have adeline hiatel hernia     Hoarseness Unsure    It comes and goes     IBS (irritable bowel syndrome) with constipation     improved on Linzess     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 Remove screws/rods     BIOPSY  01/01/2008 & 3/2017    mole biopsy, lymph node biopsy     COLONOSCOPY  3/2017    Colonoscopy and GI     ESOPHAGOSCOPY, GASTROSCOPY, DUODENOSCOPY (EGD), COMBINED N/A 8/3/2018    Procedure: COMBINED ESOPHAGOSCOPY, GASTROSCOPY, DUODENOSCOPY (EGD), BIOPSY SINGLE OR MULTIPLE;;  Surgeon: Juan Woodson MD;  Location:  GI     ESOPHAGOSCOPY, GASTROSCOPY, DUODENOSCOPY (EGD), COMBINED N/A 10/22/2018    Procedure: COMBINED ESOPHAGOSCOPY, GASTROSCOPY, DUODENOSCOPY (EGD), BIOPSY SINGLE OR MULTIPLE;  Surgeon: Sadia Carolina MD;  Location:  GI     ESOPHAGOSCOPY, GASTROSCOPY, DUODENOSCOPY (EGD), COMBINED N/A 12/17/2018    Procedure: COMBINED ESOPHAGOSCOPY, GASTROSCOPY, DUODENOSCOPY (EGD);  Surgeon: Juan Woodson MD;  Location:  GI     NASAL/SINUS POLYPECTOMY  2017    Polyps were benign     TONSILLECTOMY       TONSILLECTOMY  1981     TUBAL LIGATION       Family History   Problem Relation Age of Onset     Connective Tissue Disorder Mother         eds     Connective Tissue Disorder Sister         eds     Cancer Father         Spinal tumor grew into spinal cord, went in brain     Migraines Father      Diabetes Maternal Grandmother         Elderly diabetes     Heart Disease Maternal Grandmother      Hypertension Maternal Grandmother      Diabetes Paternal Grandmother         Elderly diabetes     Diabetes Paternal Grandfather         Elderly diabetes     Asthma Sister         Pediatric Asthma     Migraines Sister      Asthma Son         Pediatric Asthma     Thyroid Disease Sister         ,     Migraines Sister      Migraines  "Sister      Breast Cancer No family hx of          ROS   A complete ROS was otherwise negative except as noted in the HPI.  /85 (BP Location: Right arm, Patient Position: Chair, Cuff Size: Adult Large)   Pulse 60   Resp 16   Ht 1.702 m (5' 7\")   Wt 94.3 kg (208 lb)   SpO2 99%   BMI 32.58 kg/m    Exam:   GENERAL APPEARANCE: Well developed, well nourished, alert, and in no apparent distress.  EYES: EOMI, conjunctiva clear non-injected  HENT: Nasal mucosa with no edema and no discharge. No nasal polyps.    EARS: Canals clear, TMs normal.  MOUTH: Oral mucosa is moist, without any lesions, no tonsillar enlargement, no oropharyngeal exudate.  RESP: Good air flow throughout.  No crackles. No rhonchi. No wheezes.  CV: Normal S1, S2, regular rhythm, normal rate. No murmur.  No rub. No gallop. No LE edema.   MS: Extremities normal. No clubbing. No cyanosis.  SKIN: No rashes noted.  NEURO: Speech normal, normal strength and tone, normal gait and stance  PSYCH: Normal mentation, orientation to person, place, and time.  Results: 43 percutaneous and 9 intradermal environmental allergen (and 2 controls) extracts placed by MA and read by MD.  All negative.      Assessment and plan: Ada has frequent nasal symptoms and IgE mediated skin testing to environmental allergens was completely negative.  These do not appear to be allergic.  I recommend she stop the zyrtec.  Can continue the nasal steroid (fluticasone) if it is helping. She notes that the zyrtec helps (initially she said it didn't help) it may be drying her out and hence a side effect may be helping her.  She asked about food testing and our tests to foods would not show a potential link with her stomach pains and sinus pressure.  She asked about mast cell activation and I told her that there is no solid diagnosis or treatment or this and I am not convinced it exists.  F/U prn            "

## 2019-05-06 NOTE — NURSING NOTE
Chief Complaint   Patient presents with     RECHECK     Allergy testing follow up      Nellie Ramirez CMA

## 2019-05-09 NOTE — TELEPHONE ENCOUNTER
1.  Date/reason for appt: 8/30/17 3PM - Chronic Sinusitis   2.  Referring provider: Dr. Rivera  3.  Call to patient (Yes / No - short description): Yes - LVM for pt to return call.  4.  Previous care at / records requested from:   - [facility] -- records and referral in [facility] -- Pt seen with Dr. Roosevelt Bain, [facility].      Hemostasis: Electrocautery

## 2019-05-24 ENCOUNTER — MYC REFILL (OUTPATIENT)
Dept: NEUROLOGY | Facility: CLINIC | Age: 49
End: 2019-05-24

## 2019-05-24 DIAGNOSIS — G43.719 INTRACTABLE CHRONIC MIGRAINE WITHOUT AURA AND WITHOUT STATUS MIGRAINOSUS: ICD-10-CM

## 2019-05-29 RX ORDER — METOCLOPRAMIDE 10 MG/1
10 TABLET ORAL
Qty: 40 TABLET | Refills: 3 | Status: SHIPPED | OUTPATIENT
Start: 2019-05-29 | End: 2019-10-23

## 2019-06-27 ENCOUNTER — TELEPHONE (OUTPATIENT)
Dept: ANESTHESIOLOGY | Facility: CLINIC | Age: 49
End: 2019-06-27

## 2019-06-27 DIAGNOSIS — M96.1 POSTLAMINECTOMY SYNDROME: ICD-10-CM

## 2019-06-27 RX ORDER — TRAMADOL HYDROCHLORIDE 50 MG/1
50 TABLET ORAL EVERY 6 HOURS PRN
Qty: 60 TABLET | Refills: 0 | Status: SHIPPED | OUTPATIENT
Start: 2019-06-27 | End: 2019-08-29

## 2019-06-27 NOTE — TELEPHONE ENCOUNTER
[facility] Call Center    Phone Message    May a detailed message be left on voicemail: yes    Reason for Call: Medication Refill Request    Has the patient contacted the pharmacy for the refill? Yes   Name of medication being requested: traMADol (ULTRAM) 50 MG tablet  Provider who prescribed the medication: Dr. Martinez  Pharmacy: [facility] [place] that is on file  Date medication is needed: 6/27/19   Pt stated she is in pain and would like more tramadol      Action Taken: Message routed to:  [facility] pain clinic

## 2019-06-27 NOTE — TELEPHONE ENCOUNTER
Verbal orders given by Dr. Martinez to refill tramadol for 60 tablets, take 50 mg every 6 hours as needed for pain.   checked and is appropriate.  Last refill 4/6/19 for 60 tablets.

## 2019-06-28 DIAGNOSIS — M96.1 POST LAMINECTOMY SYNDROME: ICD-10-CM

## 2019-06-28 DIAGNOSIS — M96.1 LUMBAR POST-LAMINECTOMY SYNDROME: ICD-10-CM

## 2019-06-28 RX ORDER — DULOXETIN HYDROCHLORIDE 30 MG/1
CAPSULE, DELAYED RELEASE ORAL
Qty: 270 CAPSULE | Refills: 3 | Status: SHIPPED | OUTPATIENT
Start: 2019-06-28 | End: 2020-05-27

## 2019-07-19 PROBLEM — K59.00 CONSTIPATION: Status: RESOLVED | Noted: 2017-08-30 | Resolved: 2019-07-19

## 2019-07-19 PROBLEM — M99.05 PELVIC SOMATIC DYSFUNCTION: Status: RESOLVED | Noted: 2019-01-03 | Resolved: 2019-07-19

## 2019-07-19 PROBLEM — R32 URINARY INCONTINENCE: Status: RESOLVED | Noted: 2019-01-03 | Resolved: 2019-07-19

## 2019-07-19 NOTE — PROGRESS NOTES
Ada Welch was seen 2 times for evaluation and treatment.  Patient did not return for further treatment and current status is unknown.  Due to short treatment duration, no objective or functional changes were made.  Please see goal flow sheet from episode noted date below and initial evaluation for further information.    Linked Episodes   Type: Episode: Status: Noted: Resolved: Last update: Updated by:   PHYSICAL THERAPY constipation 1/3/2018 Active 1/3/2019  1/10/2019 10:24 AM Taiwo Mittal Do, PT      Comments:

## 2019-08-09 ENCOUNTER — MYC REFILL (OUTPATIENT)
Dept: ANESTHESIOLOGY | Facility: CLINIC | Age: 49
End: 2019-08-09

## 2019-08-09 DIAGNOSIS — M96.1 POSTLAMINECTOMY SYNDROME OF LUMBAR REGION: ICD-10-CM

## 2019-08-12 ENCOUNTER — MYC REFILL (OUTPATIENT)
Dept: INTERNAL MEDICINE | Facility: CLINIC | Age: 49
End: 2019-08-12

## 2019-08-12 ENCOUNTER — MYC MEDICAL ADVICE (OUTPATIENT)
Dept: CARDIOLOGY | Facility: CLINIC | Age: 49
End: 2019-08-12

## 2019-08-12 ENCOUNTER — MYC MEDICAL ADVICE (OUTPATIENT)
Dept: NEUROLOGY | Facility: CLINIC | Age: 49
End: 2019-08-12

## 2019-08-12 DIAGNOSIS — G96.00 CEREBROSPINAL FLUID LEAK: ICD-10-CM

## 2019-08-12 DIAGNOSIS — G44.219 EPISODIC TENSION-TYPE HEADACHE, NOT INTRACTABLE: ICD-10-CM

## 2019-08-12 DIAGNOSIS — G96.810 LOW INTRACRANIAL PRESSURE: Primary | ICD-10-CM

## 2019-08-12 RX ORDER — BUPRENORPHINE 10 UG/H
1 PATCH TRANSDERMAL
Qty: 4 PATCH | Refills: 1 | Status: SHIPPED | OUTPATIENT
Start: 2019-08-12 | End: 2019-10-16

## 2019-08-12 ASSESSMENT — ENCOUNTER SYMPTOMS
SHORTNESS OF BREATH: 1
MUSCLE WEAKNESS: 1
MUSCLE CRAMPS: 1
TINGLING: 1
SKIN CHANGES: 0
STIFFNESS: 1
BLOOD IN STOOL: 0
JAUNDICE: 0
COUGH: 0
WEIGHT LOSS: 0
EYE REDNESS: 1
CONSTIPATION: 1
HOT FLASHES: 0
COUGH DISTURBING SLEEP: 0
NECK MASS: 1
VOMITING: 0
DOUBLE VISION: 1
DIFFICULTY URINATING: 1
TROUBLE SWALLOWING: 1
CHILLS: 1
ABDOMINAL PAIN: 1
BACK PAIN: 1
FLANK PAIN: 1
WEAKNESS: 1
NIGHT SWEATS: 1
MYALGIAS: 1
EYE PAIN: 0
SWOLLEN GLANDS: 1
HOARSE VOICE: 1
SINUS PAIN: 1
DISTURBANCES IN COORDINATION: 1
POLYPHAGIA: 0
SORE THROAT: 1
LEG PAIN: 1
WHEEZING: 1
RECTAL PAIN: 0
SYNCOPE: 1
SPUTUM PRODUCTION: 1
BRUISES/BLEEDS EASILY: 1
JOINT SWELLING: 1
EYE IRRITATION: 1
DECREASED LIBIDO: 0
NAIL CHANGES: 0
POLYDIPSIA: 0
PARALYSIS: 0
SEIZURES: 0
NAUSEA: 1
DYSPNEA ON EXERTION: 1
LOSS OF CONSCIOUSNESS: 1
SNORES LOUDLY: 1
ALTERED TEMPERATURE REGULATION: 1
TREMORS: 1
HEMOPTYSIS: 0
DYSURIA: 0
SLEEP DISTURBANCES DUE TO BREATHING: 1
HYPOTENSION: 1
SINUS CONGESTION: 1
DIARRHEA: 1
LIGHT-HEADEDNESS: 1
HEARTBURN: 0
HEMATURIA: 0
EYE WATERING: 1
TASTE DISTURBANCE: 1
WEIGHT GAIN: 0
ORTHOPNEA: 1
PALPITATIONS: 1
BLOATING: 1
MEMORY LOSS: 1
DECREASED APPETITE: 1
HEADACHES: 1
FEVER: 0
HALLUCINATIONS: 0
EXERCISE INTOLERANCE: 1
DIZZINESS: 1
INCREASED ENERGY: 1
ARTHRALGIAS: 1
POSTURAL DYSPNEA: 1
SPEECH CHANGE: 1
POOR WOUND HEALING: 1
NUMBNESS: 1
FATIGUE: 1
SMELL DISTURBANCE: 1
HYPERTENSION: 0
BOWEL INCONTINENCE: 0
NECK PAIN: 1

## 2019-08-12 NOTE — TELEPHONE ENCOUNTER
Refill request    Medication: buprenorphine (BUTRANS) 10 MCG/HR WK patch  Place 1 patch onto the skin every 7 days    MNPMP Checked: Yes    buprenorphine (BUTRANS) 10 MCG/HR WK patch - Last refilled 7/11/19 for 4 patches       Refilled: YES, dated to be refilled 30 days since last refill    Last clinic appointment:2/14/19  Next clinic appointment:8/29/19    Patient requested to:      Sent to pharmacy

## 2019-08-13 ENCOUNTER — MYC REFILL (OUTPATIENT)
Dept: INTERNAL MEDICINE | Facility: CLINIC | Age: 49
End: 2019-08-13

## 2019-08-13 ENCOUNTER — MYC MEDICAL ADVICE (OUTPATIENT)
Dept: CARDIOLOGY | Facility: CLINIC | Age: 49
End: 2019-08-13

## 2019-08-13 DIAGNOSIS — G44.219 EPISODIC TENSION-TYPE HEADACHE, NOT INTRACTABLE: ICD-10-CM

## 2019-08-13 RX ORDER — BUTALBITAL, ACETAMINOPHEN AND CAFFEINE 50; 325; 40 MG/1; MG/1; MG/1
TABLET ORAL
Qty: 10 TABLET | Refills: 0 | Status: SHIPPED | OUTPATIENT
Start: 2019-08-13 | End: 2019-10-23

## 2019-08-13 RX ORDER — BUTALBITAL, ACETAMINOPHEN AND CAFFEINE 50; 325; 40 MG/1; MG/1; MG/1
TABLET ORAL
Qty: 10 TABLET | Refills: 0 | Status: CANCELLED | OUTPATIENT
Start: 2019-08-13

## 2019-08-13 NOTE — TELEPHONE ENCOUNTER
Patient Requested  butalbital-acetaminophen-caffeine (FIORICET/ESGIC) -40 MG tablet  Last Filled  02/12/2019  Last Office Visit  04/04/2019  Next Office Visit  09/25/2019   Checked  08/13/2019    DX: Episodic tension-type headache, not intractable     Pharmacy: [id number] [place] [facility] [place], [place] [facility] [address]    TIFFANIE STONE at 9:10 AM on 8/13/2019.

## 2019-08-15 ENCOUNTER — OFFICE VISIT (OUTPATIENT)
Dept: NEUROLOGY | Facility: CLINIC | Age: 49
End: 2019-08-15
Payer: COMMERCIAL

## 2019-08-15 VITALS
HEART RATE: 66 BPM | DIASTOLIC BLOOD PRESSURE: 64 MMHG | WEIGHT: 206.8 LBS | OXYGEN SATURATION: 98 % | SYSTOLIC BLOOD PRESSURE: 95 MMHG | BODY MASS INDEX: 32.39 KG/M2

## 2019-08-15 DIAGNOSIS — I95.1 DYSAUTONOMIA ORTHOSTATIC HYPOTENSION SYNDROME: ICD-10-CM

## 2019-08-15 DIAGNOSIS — Q79.60 EHLERS-DANLOS SYNDROME: ICD-10-CM

## 2019-08-15 DIAGNOSIS — G25.3 MYOCLONUS: ICD-10-CM

## 2019-08-15 DIAGNOSIS — G51.8 FACIAL NEURITIS: Primary | ICD-10-CM

## 2019-08-15 ASSESSMENT — PAIN SCALES - GENERAL: PAINLEVEL: SEVERE PAIN (7)

## 2019-08-15 NOTE — LETTER
"       RE: Ada Welch  [address]  [place] [address]       Service Date: 08/15/2019      Ellyn Rivera MD    [facility]    [address] [place] [address]  [id number]       RE: Ada Welch   MRN: [id number]   : 1970      Dear Dr. Rivera:      Your patient, Ada Welch, requested neurologic followup today on 08/15/2019.  She comes with multiple chief complaints including recurrent headache, myoclonic jerks, spine pain, and \"tics\" involving her left lower face.      The patient was accompanied by one of her daughters, Linda.  The patient has followed up with Dr. Nguyen, her headache specialist.  She ended up having what sounds like an epidural blood patch.  This was done blindly.  She said she had good relief from it, but then review of her records showed that she had problems for a number of days and actually was seen in the emergency room afterwards.  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  She did go to Dr. Isbell at my suggestion.  Dr. Isbell evidently told her she did not have it.  She was somewhat surprised by that visit, but she is now planning to go back to him, as she understands he is an expert in autonomic neuropathy.  She said that she has had heart rates that have gone down into the 40s.  The patient said that she has had continued variability with her pain, especially involving her spine.  She said she is very careful about her spine.  She did tell me that some of her summer has been good and that she could do up to 5000 \"steps per day.\"  She is aware that she should try to exercise.  She started to note symptoms involving the left lower face.  She described this as a \"numbish feeling.\"  She described \"tics\" there.  She said the numbness would spread through the face over days.  She also said that she had had it in her left arm to the forearm.  At times she had noted a sudden jerk up to 5-6 times per day involving the entire body the last " few weeks.  Her daughter had not noticed this.  The patient has never lost the ability to carry on during these spells.  She did go on to tell me that she had never bought a Cefaly machine as it had not been provided by insurance.  She still is hopeful to get it for her chronic headaches.  She had tried amitriptyline, but it did not help sleep and she went off it.  The patient did tell me that she had been on zonisamide that I had prescribed in the past, but stopped it.  She is not certain why she went off it, but she could not tell me that her headaches were not any worse or better but suspected that they were worse off the medication.  The patient did go on to tell me that she has had a diagnosis now of a hiatal hernia but also a rectocele.  She said because of this, she has been told she will need to have surgery in the future.  She went on to tell me that with her history of orthostatic incompetence, she is scheduled to see Dr. Russell again on 09/19/2019.      I reviewed with the patient her different medications.  These include p.r.n. albuterol, Butrans patch, Fioricet that she said she rarely takes for headache relief, previous use of Zyrtec but not now, duloxetine 60 mg in morning and 30 mg at bedtime, p.r.n. use of Relpax, Pepcid, Flonase, p.r.n. Advair, Synthroid, metoclopramide, and Prilosec.  The patient also has been on oxycodone IR 0.5 mg by mouth every 6 hours p.r.n.  She said she rarely uses this.  She also is on probiotic, Inderal- mg, p.r.n. Maxalt-MLT 5 mg which she said she alternates with the Relpax, Senokot, tizanidine 4 mg up to 4 times per day, p.r.n. tramadol 50 mg every 6 hours, vitamin B complex, vitamin C, vitamin D, and a multivitamin.      I reviewed with the patient her EMG study that I had ordered by Dr. Snowden on 11/07/2017.  At that time, she had an abnormal spontaneous activity restricted to the left cervical paraspinal muscles.  He said this finding could be due to left  "cervical radiculopathy, focal muscle injury or axial myopathy, but when isolated it was not diagnostic for any of the above entities.  The patient denied to me that she has ever had an injection there.      The patient also had an EMG done by Dr. Snowden of the lower back and legs ordered by me on 04/05/2018 which was normal.      I reviewed with her her last visit to Dr. Moncada from 11/30/2017.  She said that she may go back to her.      I reviewed my last visit with her from 05/18/2018.      I reviewed with her Dr. Nguyen's visit with her from 01/15/2019.      I reviewed with her her [facility] visit for headache on 03/03/2019.  At that time she had had a constant headache since 12/2018.  She reported that this headache had started after an episode of vomiting and had been constant since that time.  She went on to state she had been seen in Neurology for the issue and had undergone a blood patch on 02/28/2019 for suspected low-pressure headache.  She noted that she did have some improvement in her headache that first day after the procedure, but it worsened once again.  She reported it did not help with lightheadedness.  She reports that she had lightheadedness and worsening headache when she stands up.  She reports that her vision would begin a tunnel and she would begin hearing abnormal sounds in her ears which she described as water rushing.      Her doctor in the emergency room, Dr. Cedeño, indicated that \"it is unfortunate the blood patch she received last week did not seem to help her symptoms.  The patient is afebrile.  I do not suspect this is due to an intracranial infection.  This headache is chronic, was not sudden onset and I do not suspect acute subarachnoid hemorrhage.  Headache was still present at the time of discharge, but it is improved.\"      I reviewed the progress note by Dr. Moore in the ER from 03/03/2019.  At that time, the patient had contacted that doctor about worsening " "symptoms since an epidural lumbar puncture from 02/28/2019.  The patient did go on to tell me that she is entertaining the possibility of having another one.      PHYSICAL EXAMINATION:  Neurologic examination was basically unremarkable.  The patient's blood pressure supine was 92/58 with a pulse of 66.  Seated after 3 minutes it was 99/74 with a pulse of 77.  It was then repeated 1 minute later at her request and was 100/66.  Standing after 3 minutes beyond that second test seated it was 102/75 with a pulse of 83.  Otherwise, gait, station, cerebellar testing, muscle stretch reflexes, plantar stimulation, strength and cranial nerve examination are unremarkable.  I could not auscultate cervical or cerebral bruits.  She had a regular cardiac rhythm without gallops or murmurs.      IMPRESSION:   1.  Chronic headache syndrome.   2.  Chronic neck pain.   3.  Chronic low back pain.   4.  Previous history to suggest orthostatic incompetence.      The patient has a very complex history.  She is going to go back to Dr. Isbell.  I did not see any evidence of myoclonic jerks today.  I have recommended she have a 3-hour EEG done to see if any of her spells could be captured.  If they are, she could theoretically be a candidate for referral to our epileptologists.  She is going to go back to see Dr. Nguyen, her primary treating neurologist.  The patient is going to have EMG testing of the cranial nerves by Dr. Snowden, who is a specialist in that.      The patient did tell me that her pulses have been \"all over the place\" between approximately 39 and 166.  I told her that I did not think that was totally unusual with the different medicines she is on.  It is hard to tell if she had any orthostatic incompetence during those recorded pulses.  I told her that I would be happy to see her in the future and on a p.r.n. basis.  She understands that I am going to retire.  She wondered if I could refer her to any other " neurologist.  I think she should continue to see Dr. Nguyen and now Dr. Isbell.  She promised to follow up with Dr. Russell.      I spent 45 minutes with the patient and her daughter today.  Over 50% of the time of this involved counseling and coordination of care.      Thank you for allowing me to see this patient.      Sincerely yours,      Seth Galeano MD

## 2019-08-15 NOTE — NURSING NOTE
Chief Complaint   Patient presents with     RECHECK     [facility] RETURN - FOLLOW UP       Bishop Díaz, EMT

## 2019-08-16 ENCOUNTER — TELEPHONE (OUTPATIENT)
Dept: SURGERY | Facility: CLINIC | Age: 49
End: 2019-08-16

## 2019-08-16 NOTE — TELEPHONE ENCOUNTER
Call to Scarlett letting her know that she does not need another motility test but she does still need the pH testing to be completed.    Scarlett verbalized understanding and will await a call from YONATAN to schedule.

## 2019-08-16 NOTE — TELEPHONE ENCOUNTER
RECORDS RECEIVED FROM: Internal - Dr Galeano - [facility] Neurology   DATE RECEIVED: 9/3/19   NOTES (FOR ALL VISITS) STATUS DETAILS   OFFICE NOTE from referring provider Internal 8/15/19  5/18/18  (additional encounters in Epic)   OFFICE NOTE from other specialist Internal Dr Nguyen @ [facility] Neuro:  1/15/19  7/17/18  5/1/18  (additional encounters in Epic)   DISCHARGE SUMMARY from hospital Internal [facility]:  7/23/18-7/25/18 7/6/18-7/9/18   DISCHARGE REPORT from the ER Internal [facility]:  3/3/19   OPERATIVE REPORT N/A    MEDICATION LIST Internal    IMAGING  (FOR ALL VISITS)     EMG Received [facility]:  8/9/18    [facility]:  4/5/18   EEG N/A    ECT N/A    MRI (HEAD, NECK, SPINE) [facility]:  MRA Brain 7/25/18  MRI Brain 7/25/18  MRI Brain 6/2/18  MRI Lumbar Spine 4/20/18  MRI Cervical Spine 7/24/17  MRA Brain 7/24/17  MRI Brain 7/24/17  MRI Lumbar Spine 9/14/16   LUMBAR PUNCTURE [facility]:  7/26/17   [facility] Scan N/A    CT (HEAD, NECK, SPINE) [facility]:  CT Head 3/3/19  CT Head 7/7/18  CT Lumbar Spine 4/26/18

## 2019-08-23 DIAGNOSIS — G90.A POTS (POSTURAL ORTHOSTATIC TACHYCARDIA SYNDROME): Primary | ICD-10-CM

## 2019-08-23 RX ORDER — PROPRANOLOL HYDROCHLORIDE 120 MG/1
120 CAPSULE, EXTENDED RELEASE ORAL DAILY
Qty: 30 CAPSULE | Refills: 11 | Status: SHIPPED | OUTPATIENT
Start: 2019-08-23 | End: 2020-07-04

## 2019-08-23 NOTE — TELEPHONE ENCOUNTER
Rx Authorization:    Requested Medication/ Dose Propanolol  mg     Date last refill ordered: 08/02/18    Quantity ordered: 30    # refills: 11    Date of last clinic visit with ordering provider: 01/15/19    Date of next clinic visit with ordering provider: 11/18/19    All pertinent protocol data (lab date/result):     Include pertinent information from patients message:

## 2019-08-26 ENCOUNTER — ALLIED HEALTH/NURSE VISIT (OUTPATIENT)
Dept: NEUROLOGY | Facility: CLINIC | Age: 49
End: 2019-08-26
Payer: COMMERCIAL

## 2019-08-26 DIAGNOSIS — G25.3 MYOCLONUS: Primary | ICD-10-CM

## 2019-08-26 NOTE — PROGRESS NOTES
CPT:91213-40  [facility]hr [facility]  [facility]  Dr. Franks

## 2019-08-27 ENCOUNTER — TELEPHONE (OUTPATIENT)
Dept: GASTROENTEROLOGY | Facility: CLINIC | Age: 49
End: 2019-08-27

## 2019-08-27 DIAGNOSIS — K21.9 GASTROESOPHAGEAL REFLUX DISEASE, ESOPHAGITIS PRESENCE NOT SPECIFIED: Primary | ICD-10-CM

## 2019-08-28 NOTE — PROGRESS NOTES
"Service Date: 08/15/2019      August 15, 2019      Ellyn Rivera MD    [facility]    [address] [place] [address]       RE: Ada Welch   MRN: [id number]   : 1970      Dear Dr. Rivera:      Your patient, Ada Welch, requested neurologic followup today on 08/15/2019.  She comes with multiple chief complaints including recurrent headache, myoclonic jerks, spine pain, and \"tics\" involving her left lower face.      The patient was accompanied by one of her daughters, Linda.  The patient has followed up with Dr. Nguyen, her headache specialist.  She ended up having what sounds like an epidural blood patch.  This was done blindly.  She said she had good relief from it, but then review of her records showed that she had problems for a number of days and actually was seen in the emergency room afterwards.  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  She did go to Dr. Isbell at my suggestion.  Dr. Isbell evidently told her she did not have it.  She was somewhat surprised by that visit, but she is now planning to go back to him, as she understands he is an expert in autonomic neuropathy.  She said that she has had heart rates that have gone down into the 40s.  The patient said that she has had continued variability with her pain, especially involving her spine.  She said she is very careful about her spine.  She did tell me that some of her summer has been good and that she could do up to 5000 \"steps per day.\"  She is aware that she should try to exercise.  She started to note symptoms involving the left lower face.  She described this as a \"numbish feeling.\"  She described \"tics\" there.  She said the numbness would spread through the face over days.  She also said that she had had it in her left arm to the forearm.  At times she had noted a sudden jerk up to 5-6 times per day involving the entire body the last few weeks.  Her daughter had not noticed this.  " The patient has never lost the ability to carry on during these spells.  She did go on to tell me that she had never bought a Cefaly machine as it had not been provided by insurance.  She still is hopeful to get it for her chronic headaches.  She had tried amitriptyline, but it did not help sleep and she went off it.  The patient did tell me that she had been on zonisamide that I had prescribed in the past, but stopped it.  She is not certain why she went off it, but she could not tell me that her headaches were not any worse or better but suspected that they were worse off the medication.  The patient did go on to tell me that she has had a diagnosis now of a hiatal hernia but also a rectocele.  She said because of this, she has been told she will need to have surgery in the future.  She went on to tell me that with her history of orthostatic incompetence, she is scheduled to see Dr. Russell again on 09/19/2019.      I reviewed with the patient her different medications.  These include p.r.n. albuterol, Butrans patch, Fioricet that she said she rarely takes for headache relief, previous use of Zyrtec but not now, duloxetine 60 mg in morning and 30 mg at bedtime, p.r.n. use of Relpax, Pepcid, Flonase, p.r.n. Advair, Synthroid, metoclopramide, and Prilosec.  The patient also has been on oxycodone IR 0.5 mg by mouth every 6 hours p.r.n.  She said she rarely uses this.  She also is on probiotic, Inderal- mg, p.r.n. Maxalt-MLT 5 mg which she said she alternates with the Relpax, Senokot, tizanidine 4 mg up to 4 times per day, p.r.n. tramadol 50 mg every 6 hours, vitamin B complex, vitamin C, vitamin D, and a multivitamin.      I reviewed with the patient her EMG study that I had ordered by Dr. Snowden on 11/07/2017.  At that time, she had an abnormal spontaneous activity restricted to the left cervical paraspinal muscles.  He said this finding could be due to left cervical radiculopathy, focal muscle injury or  "axial myopathy, but when isolated it was not diagnostic for any of the above entities.  The patient denied to me that she has ever had an injection there.      The patient also had an EMG done by Dr. Snowden of the lower back and legs ordered by me on 04/05/2018 which was normal.      I reviewed with her her last visit to Dr. Moncada from 11/30/2017.  She said that she may go back to her.      I reviewed my last visit with her from 05/18/2018.      I reviewed with her Dr. Nguyen's visit with her from 01/15/2019.      I reviewed with her her [facility] visit for headache on 03/03/2019.  At that time she had had a constant headache since 12/2018.  She reported that this headache had started after an episode of vomiting and had been constant since that time.  She went on to state she had been seen in Neurology for the issue and had undergone a blood patch on 02/28/2019 for suspected low-pressure headache.  She noted that she did have some improvement in her headache that first day after the procedure, but it worsened once again.  She reported it did not help with lightheadedness.  She reports that she had lightheadedness and worsening headache when she stands up.  She reports that her vision would begin a tunnel and she would begin hearing abnormal sounds in her ears which she described as water rushing.      Her doctor in the emergency room, Dr. Cedeño, indicated that \"it is unfortunate the blood patch she received last week did not seem to help her symptoms.  The patient is afebrile.  I do not suspect this is due to an intracranial infection.  This headache is chronic, was not sudden onset and I do not suspect acute subarachnoid hemorrhage.  Headache was still present at the time of discharge, but it is improved.\"      I reviewed the progress note by Dr. Moore in the ER from 03/03/2019.  At that time, the patient had contacted that doctor about worsening symptoms since an epidural lumbar puncture from " "02/28/2019.  The patient did go on to tell me that she is entertaining the possibility of having another one.      PHYSICAL EXAMINATION:  Neurologic examination was basically unremarkable.  The patient's blood pressure supine was 92/58 with a pulse of 66.  Seated after 3 minutes it was 99/74 with a pulse of 77.  It was then repeated 1 minute later at her request and was 100/66.  Standing after 3 minutes beyond that second test seated it was 102/75 with a pulse of 83.  Otherwise, gait, station, cerebellar testing, muscle stretch reflexes, plantar stimulation, strength and cranial nerve examination are unremarkable.  I could not auscultate cervical or cerebral bruits.  She had a regular cardiac rhythm without gallops or murmurs.      IMPRESSION:   1.  Chronic headache syndrome.   2.  Chronic neck pain.   3.  Chronic low back pain.   4.  Previous history to suggest orthostatic incompetence.      The patient has a very complex history.  She is going to go back to Dr. Isbell.  I did not see any evidence of myoclonic jerks today.  I have recommended she have a 3-hour EEG done to see if any of her spells could be captured.  If they are, she could theoretically be a candidate for referral to our epileptologists.  She is going to go back to see Dr. Nguyen, her primary treating neurologist.  The patient is going to have EMG testing of the cranial nerves by Dr. Snowden, who is a specialist in that.      The patient did tell me that her pulses have been \"all over the place\" between approximately 39 and 166.  I told her that I did not think that was totally unusual with the different medicines she is on.  It is hard to tell if she had any orthostatic incompetence during those recorded pulses.  I told her that I would be happy to see her in the future and on a p.r.n. basis.  She understands that I am going to retire.  She wondered if I could refer her to any other neurologist.  I think she should continue to see Dr. Nguyen " and now Dr. Isbell.  She promised to follow up with Dr. Russell.      I spent 45 minutes with the patient and her daughter today.  Over 50% of the time of this involved counseling and coordination of care.      Thank you for allowing me to see this patient.      Sincerely yours,            MD BERNADETTE Spence MD             D: 2019   T: 2019   MT: SUE      Name:     PANCHO HENAO   MRN:      [id number]        Account:      [id number]   :      1970           Service Date: 08/15/2019      Document: [id number]

## 2019-08-29 ENCOUNTER — TELEPHONE (OUTPATIENT)
Dept: ANESTHESIOLOGY | Facility: CLINIC | Age: 49
End: 2019-08-29

## 2019-08-29 ENCOUNTER — MYC MEDICAL ADVICE (OUTPATIENT)
Dept: ANESTHESIOLOGY | Facility: CLINIC | Age: 49
End: 2019-08-29

## 2019-08-29 DIAGNOSIS — M96.1 POSTLAMINECTOMY SYNDROME: ICD-10-CM

## 2019-08-29 RX ORDER — TRAMADOL HYDROCHLORIDE 50 MG/1
50 TABLET ORAL EVERY 6 HOURS PRN
Qty: 60 TABLET | Refills: 0 | Status: SHIPPED | OUTPATIENT
Start: 2019-08-29 | End: 2019-09-19

## 2019-08-29 RX ORDER — OXYCODONE HYDROCHLORIDE 5 MG/1
2.5 TABLET ORAL EVERY 6 HOURS PRN
Qty: 15 TABLET | Refills: 0 | Status: SHIPPED | OUTPATIENT
Start: 2019-08-29 | End: 2019-09-26

## 2019-08-29 ASSESSMENT — ENCOUNTER SYMPTOMS
SINUS CONGESTION: 1
BOWEL INCONTINENCE: 0
PALPITATIONS: 1
WHEEZING: 1
SMELL DISTURBANCE: 1
RECTAL PAIN: 0
EYE IRRITATION: 1
EXERCISE INTOLERANCE: 1
EYE PAIN: 1
SHORTNESS OF BREATH: 1
ARTHRALGIAS: 1
WEIGHT LOSS: 1
NIGHT SWEATS: 1
BRUISES/BLEEDS EASILY: 1
SYNCOPE: 1
ABDOMINAL PAIN: 1
EYE WATERING: 1
SORE THROAT: 1
TROUBLE SWALLOWING: 1
EYE REDNESS: 1
COUGH: 1
NECK MASS: 1
DYSURIA: 1
JAUNDICE: 0
DIFFICULTY URINATING: 1
HYPOTENSION: 1
DYSPNEA ON EXERTION: 1
HEMATURIA: 0
SPEECH CHANGE: 1
SKIN CHANGES: 0
PARALYSIS: 0
ORTHOPNEA: 1
FEVER: 0
MUSCLE WEAKNESS: 1
SINUS PAIN: 1
SPUTUM PRODUCTION: 1
NUMBNESS: 1
SWOLLEN GLANDS: 1
MYALGIAS: 1
FATIGUE: 1
SEIZURES: 0
BACK PAIN: 1
POLYPHAGIA: 0
NAIL CHANGES: 1
BLOOD IN STOOL: 0
HOARSE VOICE: 1
TREMORS: 1
HEARTBURN: 1
JOINT SWELLING: 1
DISTURBANCES IN COORDINATION: 1
POSTURAL DYSPNEA: 1
CHILLS: 1
NAUSEA: 1
FLANK PAIN: 1
STIFFNESS: 1
DECREASED APPETITE: 1
HYPERTENSION: 0
TINGLING: 1
MEMORY LOSS: 1
ALTERED TEMPERATURE REGULATION: 1
BLOATING: 1
POOR WOUND HEALING: 1
SNORES LOUDLY: 1
COUGH DISTURBING SLEEP: 0
HALLUCINATIONS: 0
MUSCLE CRAMPS: 1
POLYDIPSIA: 1
DOUBLE VISION: 1
NECK PAIN: 1
WEAKNESS: 1
TASTE DISTURBANCE: 1
INCREASED ENERGY: 1
LIGHT-HEADEDNESS: 1
SLEEP DISTURBANCES DUE TO BREATHING: 1
DIARRHEA: 1
HEMOPTYSIS: 0
CONSTIPATION: 1
HOT FLASHES: 0
HEADACHES: 1
LOSS OF CONSCIOUSNESS: 1
VOMITING: 0
DIZZINESS: 1
LEG PAIN: 1
DECREASED LIBIDO: 0

## 2019-08-29 NOTE — TELEPHONE ENCOUNTER
[facility] Call Center    Phone Message    May a detailed message be left on voicemail: yes    Reason for Call: Symptoms or Concerns         Current symptom or concern: Pain in lower back.  Causing numbness in the face and tremors.    Symptoms have been present for:  2.5 week(s)    Has patient previously been seen for this? Yes    By : Dr. Galeano    Date: 8.15.19    Are there any new or worsening symptoms? No      Action Taken: Message routed to:  [facility] ([facility]): ump pain

## 2019-08-29 NOTE — TELEPHONE ENCOUNTER
RN[facility] called to speak with pt regarding symptoms and missed appointment.  She was apologetic for missing the appointment today; she states she thought it was tomorrow.  Pt is already rescheduled for follow up at next available 10/10/19 and is on the wait list for a sooner appointment.       Verbal orders given by Dr. Martinez to refill pts tramadol for 60 tablets and to refill oxycodone 2.5mg every day PRN for 15 tablets.      Pt updated that tramadol sent into pharmacy and oxycodone is being mailed to her.      Jessica De Souza, RN, BSN

## 2019-09-03 ENCOUNTER — PRE VISIT (OUTPATIENT)
Dept: NEUROLOGY | Facility: CLINIC | Age: 49
End: 2019-09-03

## 2019-09-03 ENCOUNTER — OFFICE VISIT (OUTPATIENT)
Dept: NEUROLOGY | Facility: CLINIC | Age: 49
End: 2019-09-03
Attending: PSYCHIATRY & NEUROLOGY
Payer: COMMERCIAL

## 2019-09-03 VITALS
HEIGHT: 67 IN | DIASTOLIC BLOOD PRESSURE: 80 MMHG | HEART RATE: 103 BPM | SYSTOLIC BLOOD PRESSURE: 131 MMHG | WEIGHT: 208 LBS | BODY MASS INDEX: 32.65 KG/M2

## 2019-09-03 DIAGNOSIS — G25.3 MYOCLONUS: Primary | ICD-10-CM

## 2019-09-03 ASSESSMENT — MIFFLIN-ST. JEOR: SCORE: 1611.11

## 2019-09-03 ASSESSMENT — PAIN SCALES - GENERAL: PAINLEVEL: SEVERE PAIN (6)

## 2019-09-03 NOTE — NURSING NOTE
Chief Complaint   Patient presents with     Consult     NEW PATIENT- TREMOR      Andreea Brown CMA  Patient Care Supervisor  Endocrinology & Diabetes   Neurology, Neurosurgery, PM&R    Patient stated med list was current

## 2019-09-03 NOTE — LETTER
9/3/2019       RE: Ada Welch  [address]  [place] [address]     Dear Colleague,    Thank you for referring your patient, Ada Welch, to the [facility] NEUROLOGY at [facility]. Please see a copy of my visit note below.    Department of Neurology  Movement Disorders Division   Initial Clinic Evaluation     Patient: Ada Welch   MRN: [id number]   : 1970   Date of Visit: 9/3/2019     Referring Physician: Waleska    Reason for Referral: Involuntary movements    Impression:  1.  Myoclonus.  By her description she has what sounds like exaggerated hypnic jerks.  These are not frequent.  I suspect that this myoclonus worsened by her medication regimen and pain.  I do not think it requires any further investigation.  2.  Left facial spasms.  I doubt that this is hemifacial spasm.  I see  the patient has an EMG in the future looking for this and this is reasonable.  This is intermittent and rare and I do not think it represents a pathological movement.    Recommendations:  1.  Complete the EMG for hemifacial spasm  2.  Reassurance    Please call or write with questions or concerns,    Sincerely yours,    Rajan Snowden MD , MD    History of Present Illness  Ms. Welch is a 48 year old female referred by Dr. Galeano for involuntary movements.    Ms. Welch has a complex past neurological history.  She carries a diagnosis of Ehler-Danlos syndrome.  She has had a past lumbar surgery with failed fusion.  She is followed for neck pain.  She says that she has been diagnosed as having cranial cervical instability.  She has a small Chiari malformation.  She has chronic pain and chronic migraines.  The chronic migraines are managed by Dr. Nguyen.  Her cervical and Chiari problems are being managed conservatively by Dr. Moncada.    She comes because of problems that began in the fall .  She began to have rare muscle jerks.  She likens these jerks to the jerks  one experiences while falling asleep.  These would occur however when she was wide awake.  She became concerned this past August.  She traveled on Friday for a long distance in the car.  That weekend 5-7 times/sonal these jerks occurred.  They would occur without warning.  They were not stimulated by sound or touch.  They were surprising but did not cause any disability.  The next weekend she again traveled a long distance and she noticed again the jerks occurred 5-7 times in a day.  She has not had them frequently since that time.  She discussed them with Dr. Galeano.  Along with the jerk she was noticing some pulling of the left side of her face.  There was no facial weakness.  There was no continuous twitching.  There was no facial pain.  She had an EEG last Monday and it has not been reported.  She states that during that the hyperventilation phase of this EEG she had carpopedal spasm.  A recent total calcium level was normal.    She has had EMGs in the past.  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  I did an EMG of her legs and found no changes.  I did not find any evidence of myotonia.    The patient is on multiple medications for her chronic pain and migraines.  These include buprenorphine, Fioricet, duloxetine, oxycodone, propranolol, rizatriptan, tizanidine, tramadol and zonisamide.    She feels that the myoclonic jerks occur more when she is having neck pain.  She feels that the jerks her facial pulling and pain consist of a constellation of symptoms.  She is never had seizure.  She is never had light sensitivity.  There is no family history of myoclonus.    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      Chronic sinusitis 00/2007    Diagnosed with chronic pansinusitis 2016     Chronic tonsillitis 1980    Had tonsils removed 02/1981, rheumatic fever     Tom-Danlos disease      Eosinophilic esophagitis 08/2018     Gastroesophageal reflux disease 3/1/2017    I have large hiatal hernia     Hernia, abdominal 10/2016    Hiatal Hernia     Hiatal hernia 2016    Have adeline hiatel hernia     Hoarseness Unsure    It comes and goes     IBS (irritable bowel syndrome) with constipation     improved on Linzess     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     Migraines 1/1/2007    Unsure of exact date     Nasal polyps 2013    Were revoved 03/2017, benign     Other nervous system complications 1/2014    Autonomic nervous system disorder, neuralgia, neuropathy     Pneumonia Unsure    Have had pneumonia several times     Swelling, mass, or lump in head and neck 08/2016    Lymph node has been growing for last year     Thyroid disease 01/01/2008       Past Surgical History:   Past Surgical History:   Procedure Laterality Date     ADENOIDECTOMY  1981     AS REPAIR OF NASAL SEPTUM       BACK SURGERY  12/09/2013  10/01/2014    Spinal fusion L4-S1, L5 moving 5/8ths in. Remove screws/rods     BIOPSY  01/01/2008 & 3/2017    mole biopsy, lymph node biopsy     COLONOSCOPY  3/2017    Colonoscopy and GI     ESOPHAGOSCOPY, GASTROSCOPY, DUODENOSCOPY (EGD), COMBINED N/A 8/3/2018    Procedure: COMBINED ESOPHAGOSCOPY, GASTROSCOPY, DUODENOSCOPY (EGD), BIOPSY SINGLE OR MULTIPLE;;  Surgeon: Juan Woodson MD;  Location:  GI     ESOPHAGOSCOPY, GASTROSCOPY, DUODENOSCOPY (EGD), COMBINED N/A 10/22/2018    Procedure: COMBINED ESOPHAGOSCOPY, GASTROSCOPY, DUODENOSCOPY (EGD), BIOPSY SINGLE OR MULTIPLE;  Surgeon: Sadia Carolina MD;  Location: [facility] GI     ESOPHAGOSCOPY, GASTROSCOPY, DUODENOSCOPY (EGD), COMBINED N/A 12/17/2018    Procedure: COMBINED ESOPHAGOSCOPY, GASTROSCOPY, DUODENOSCOPY (EGD);  Surgeon: Juan Woodson MD;   Location:  GI     NASAL/SINUS POLYPECTOMY  2017    Polyps were benign     TONSILLECTOMY       TONSILLECTOMY  1981     TUBAL LIGATION         Medications:  Current Outpatient Medications   Medication Sig Dispense Refill     ACETAMINOPHEN PO Take 1,000 mg by mouth every 6 hours as needed for pain Take with tramadol       albuterol (PROAIR HFA/PROVENTIL HFA/VENTOLIN HFA) 108 (90 BASE) MCG/ACT Inhaler Inhale 2 puffs into the lungs every 6 hours as needed        buprenorphine (BUTRANS) 10 MCG/HR WK patch Place 1 patch onto the skin every 7 days 4 patch 1     butalbital-acetaminophen-caffeine (FIORICET/ESGIC) -40 MG tablet TAKE ONE TABLET BY MOUTH EVERY 4 HOURS AS NEEDED. MAX 10 PER MONTH. 10 tablet 0     cetirizine (ZYRTEC) 10 MG tablet Take 10 mg by mouth daily        COMPRESSION STOCKINGS 1 each daily 20-30 mmHg Thigh Length measure and fit, color and style per patient preference. Doff n jose per need. 3 each 3     DULoxetine (CYMBALTA) 30 MG capsule Take 60mg in the morning and 30mg at bedtime 270 capsule 3     eletriptan (RELPAX) 20 MG tablet Take 1 tablet (20 mg) by mouth at onset of headache for migraine May repeat in 2 hours. Max 4 tablets/24 hours. 18 tablet 1     EPINEPHrine (EPIPEN/ADRENACLICK/OR ANY BX GENERIC EQUIV) 0.3 MG/0.3ML injection 2-pack Inject 0.3 mg into the muscle       famotidine (PEPCID) 20 MG tablet Take 1 tablet (20 mg) by mouth At Bedtime 90 tablet 1     fluticasone (FLONASE) 50 MCG/ACT spray INSTILL 2 SPRAYS INTO BOTH NOSTRILS DAILY 48 mL 3     fluticasone-salmeterol (ADVAIR DISKUS) 250-50 MCG/DOSE diskus inhaler Inhale 1 puff into the lungs 2 times daily as needed        levothyroxine (SYNTHROID) 25 MCG tablet Take 1 tablet (25 mcg) by mouth daily 90 tablet 3     linaclotide (LINZESS) 145 MCG capsule Take 1 capsule (145 mcg) by mouth every morning (before breakfast) Last refill until GI clinic follow up. 90 capsule 2     medical cannabis (Patient's own supply.  Not a prescription)  Take 1 Dose by mouth See Admin Instructions (This is NOT a prescription, and does not certify that the patient has a qualifying medical condition for medical cannabis.  The purpose of this order is  to document that the patient reports taking medical cannabis.)       metoclopramide (REGLAN) 10 MG tablet Take 1 tablet (10 mg) by mouth 4 times daily (before meals and nightly) 40 tablet 3     Multiple Vitamins-Minerals (MULTIVITAMIN PO) Take 1 tablet by mouth daily        Nerve Stimulator (CEFALY KIT) KAROLINE 1 Device daily as needed 1 Device 0     omeprazole (PRILOSEC) 40 MG capsule Take 1 capsule (40 mg) by mouth 2 times daily as needed (prn) 180 capsule 2     ondansetron (ZOFRAN) 4 MG tablet Take 4 mg by mouth       ondansetron (ZOFRAN-ODT) 4 MG ODT tab Take 1-2 tablets (4-8 mg) by mouth every 12 hours as needed for nausea 60 tablet 1     oxyCODONE (ROXICODONE) 5 MG tablet Take 0.5 tablets (2.5 mg) by mouth every 6 hours as needed for severe pain 15 tablet 0     Probiotic Product (PROBIOTIC DAILY PO) Take 2 packets by mouth every morning        propranolol ER (INDERAL LA) 120 MG 24 hr capsule Take 1 capsule (120 mg) by mouth daily 30 capsule 11     Propranolol HCl SR Beads 120 MG CP24 Take 120 mg by mouth daily 30 capsule 11     rizatriptan (MAXALT-MLT) 5 MG ODT TAKE 1-2 TABLETS (5-10 MG) BY MOUTH AT ONSET OF HEADACHE FOR MIGRAINE (MAX 30 MG IN 24 HOURS) 9 tablet 11     senna (SENOKOT) 8.6 MG tablet Take 17.2-34.4 mg by mouth       SUMAtriptan (IMITREX STATDOSE) 6 MG/0.5ML pen injector kit Inject 0.5 ml (6 mg) subcutaneously at onset of migraine, May repeat in 1 hour , Max 12 mg/24 hrs 2 kit 1     tiZANidine (ZANAFLEX) 4 MG tablet Take 1 tablet (4 mg) by mouth 4 times daily as needed for muscle spasms 360 tablet 3     traMADol (ULTRAM) 50 MG tablet Take 1 tablet (50 mg) by mouth every 6 hours as needed for severe pain 60 tablet 0     vitamin B complex with vitamin C (VITAMIN  B COMPLEX) TABS tablet Take 1 tablet by  mouth daily       VITAMIN D, CHOLECALCIFEROL, PO Take 2,000 Units by mouth daily       ZONISAMIDE PO Take 75 mg by mouth daily            Movement Disorder-related Medications                                                                                                                                                             Allergies: is allergic to bee venom; no clinical screening - see comments; chicken-derived products (egg); compazine [prochlorperazine]; food; gabapentin; gluten meal; pregabalin; seasonal allergies; topiramate; wheat; and wheat bran.    Social History:   Social History     Socioeconomic History     Marital status:      Spouse name: Carry     Number of children: 5     Years of education: None     Highest education level: None   Occupational History     None   Social Needs     Financial resource strain: None     Food insecurity:     Worry: None     Inability: None     Transportation needs:     Medical: None     Non-medical: None   Tobacco Use     Smoking status: Former Smoker     Packs/day: 0.20     Years: 10.00     Pack years: 2.00     Types: Cigarettes     Start date: 1991     Last attempt to quit: 2013     Years since quittin.7     Smokeless tobacco: Never Used     Tobacco comment: I somked on and off during specified dates.   Substance and Sexual Activity     Alcohol use: Yes     Comment: occasional     Drug use: Yes     Types: Marijuana     Comment: medical marijuana     Sexual activity: Yes     Partners: Male     Birth control/protection: Female Surgical   Lifestyle     Physical activity:     Days per week: None     Minutes per session: None     Stress: None   Relationships     Social connections:     Talks on phone: None     Gets together: None     Attends Anabaptist service: None     Active member of club or organization: None     Attends meetings of clubs or organizations: None     Relationship status: None     Intimate partner violence:     Fear of current or ex  partner: None     Emotionally abused: None     Physically abused: None     Forced sexual activity: None   Other Topics Concern     Parent/sibling w/ CABG, MI or angioplasty before 65F 55M? No   Social History Narrative    5 kids: 24, 23, 19, 13, 5 yo     works at [employer] by [employer]       Family History:  Family History   Problem Relation Age of Onset     Connective Tissue Disorder Mother         eds     Connective Tissue Disorder Sister         eds     Cancer Father         Spinal tumor grew into spinal cord, went in brain     Migraines Father      Diabetes Maternal Grandmother         Elderly diabetes     Heart Disease Maternal Grandmother      Hypertension Maternal Grandmother      Diabetes Paternal Grandmother         Elderly diabetes     Diabetes Paternal Grandfather         Elderly diabetes     Asthma Sister         Pediatric Asthma     Migraines Sister      Asthma Son         Pediatric Asthma     Thyroid Disease Sister         ,     Migraines Sister      Migraines Sister      Breast Cancer No family hx of        Comprehensive Neurologic Exam    Mental Status Exam   The patient is alert, oriented and exhibits no difficulty with cognition or memory.  A formal short test of mental status was performed.     Neurovascular         Speech and Language   Right Left     Carotid Bruit Absent Absent  Speech is normal.   Dorsalis Pedis Pulse Normal Normal  Description   Posterior Tibial Pulse Normal Normal  Language is normal.     Cranial Nerve Exam                 Right Left   Right Left   II                                        Normal Normal  Hearing (VIII) Normal Normal      III, IV, V!                   Normal Normal  Nystagmus (VIII) Normal Normal   EOM description                              Gag (IX, X) Normal Normal   Facial Sensation (V) Normal Normal  SCM (XI) Normal Normal   Muscles of Mastication (V) Normal Normal  Trapezius (XI) Normal Normal   Facial Strength (VII) Normal  Normal  Tongue (XII) Normal Normal     Motor Exam  Strength (*/5 grading)  Muscle                  Right Left  Muscle Right Left   Frontalis                                           Normal Normal  Iliopsoas Normal    Normal          Orbicularis Oculi                     Normal Normal  Quadrideps Normal    Normal        Orbicularis Oris                         Normal Normal  Anterior Tibial Normal Normal      Deltoid Normal Normal  Gastrocnemius Normal    Normal   Biceps Normal Normal  Extensor Hallucis Longus Normal    Normal   Triceps Normal Normal  Toe Extensors Normal    Normal   EDC Normal Normal  Toe Flexor Normal    Normal   Finger Flexors Normal Normal  Other Normal Normal   First Dorsal Interosseous Normal Normal  Other Normal Normal   Hypothenar Normal Normal  Other Normal Normal   Thenar Normal Normal  Other Normal Normal     Reflexes      Tone   Right Left   Right Left   Biceps Normal Normal  Spasticity (S)  Rigidity (R)     Triceps Normal Normal  Neck     Brachioradialis Normal Normal  Arm     Quadriceps Normal Normal  Leg     Ankle Normal Normal       Babkinski Flexor Flexor         AMR       Coordination   Right Left   Right Left   Fingers Normal Normal  Finger nose finger Normal Normal   Hand Normal Normal  Drift Normal Normal   Leg Normal Normal  Heel Carrillo Normal Normal   Foot Normal Normal  Other     Other             Involuntary Movements    Gait and Station  None            Right Left  Stand & Sit Normal   (Movement type) Normal Normal  Gait Normal   (Movement type) Normal Normal  Tandem Normal   (Movement type) Normal Normal  Romberg Absent       Sensory Exam          Right Left    Pin Normal Normal    Vibration Normal Normal    Joint position Normal Normal    Other          Coding statement:     Duration of  Services: face-to-face 60 min.   Greater than 50% of this visit was spent in counseling and coordination of care.    Again, thank you for allowing me to participate in the care of your  patient.      Sincerely,    Rajan Snowden MD

## 2019-09-03 NOTE — PROGRESS NOTES
Department of Neurology  Movement Disorders Division   Initial Clinic Evaluation     Patient: Ada Welch   MRN: [id number]   : 1970   Date of Visit: 9/3/2019     Referring Physician: Waleska    Reason for Referral: Involuntary movements    Impression:  1.  Myoclonus.  By her description she has what sounds like exaggerated hypnic jerks.  These are not frequent.  I suspect that this myoclonus worsened by her medication regimen and pain.  I do not think it requires any further investigation.  2.  Left facial spasms.  I doubt that this is hemifacial spasm.  I see  the patient has an EMG in the future looking for this and this is reasonable.  This is intermittent and rare and I do not think it represents a pathological movement.    Recommendations:  1.  Complete the EMG for hemifacial spasm  2.  Reassurance    Please call or write with questions or concerns,    Sincerely yours,    Rajan Snowden MD , MD    History of Present Illness  Ms. Welch is a 48 year old female referred by Dr. Galeano for involuntary movements.    Ms. Welch has a complex past neurological history.  She carries a diagnosis of Ehler-Danlos syndrome.  She has had a past lumbar surgery with failed fusion.  She is followed for neck pain.  She says that she has been diagnosed as having cranial cervical instability.  She has a small Chiari malformation.  She has chronic pain and chronic migraines.  The chronic migraines are managed by Dr. Nguyen.  Her cervical and Chiari problems are being managed conservatively by Dr. Moncada.    She comes because of problems that began in the 2017.  She began to have rare muscle jerks.  She likens these jerks to the jerks one experiences while falling asleep.  These would occur however when she was wide awake.  She became concerned this past August.  She traveled on Friday for a long distance in the car.  That weekend 5-7 times/sonal these jerks occurred.  They would occur without warning.  They  were not stimulated by sound or touch.  They were surprising but did not cause any disability.  The next weekend she again traveled a long distance and she noticed again the jerks occurred 5-7 times in a day.  She has not had them frequently since that time.  She discussed them with Dr. Galeano.  Along with the jerk she was noticing some pulling of the left side of her face.  There was no facial weakness.  There was no continuous twitching.  There was no facial pain.  She had an EEG last Monday and it has not been reported.  She states that during that the hyperventilation phase of this EEG she had carpopedal spasm.  A recent total calcium level was normal.    She has had EMGs in the past.  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  I did an EMG of her legs and found no changes.  I did not find any evidence of myotonia.    The patient is on multiple medications for her chronic pain and migraines.  These include buprenorphine, Fioricet, duloxetine, oxycodone, propranolol, rizatriptan, tizanidine, tramadol and zonisamide.    She feels that the myoclonic jerks occur more when she is having neck pain.  She feels that the jerks her facial pulling and pain consist of a constellation of symptoms.  She is never had seizure.  She is never had light sensitivity.  There is no family history of myoclonus.    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     Chronic sinusitis 00/2007    Diagnosed with chronic pansinusitis 2016     Chronic tonsillitis 1980    Had tonsils removed 02/1981, rheumatic fever     Tom-Danlos disease      Eosinophilic esophagitis 08/2018     Gastroesophageal reflux disease 3/1/2017    I have large hiatal  hernia     Hernia, abdominal 10/2016    Hiatal Hernia     Hiatal hernia 2016    Have adeline hiatel hernia     Hoarseness Unsure    It comes and goes     IBS (irritable bowel syndrome) with constipation     improved on Linzess     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     Migraines 1/1/2007    Unsure of exact date     Nasal polyps 2013    Were revoved 03/2017, benign     Other nervous system complications 1/2014    Autonomic nervous system disorder, neuralgia, neuropathy     Pneumonia Unsure    Have had pneumonia several times     Swelling, mass, or lump in head and neck 08/2016    Lymph node has been growing for last year     Thyroid disease 01/01/2008       Past Surgical History:   Past Surgical History:   Procedure Laterality Date     ADENOIDECTOMY  1981     AS REPAIR OF NASAL SEPTUM       BACK SURGERY  12/09/2013  10/01/2014    Spinal fusion L4-S1, L5 moving 5/8ths in. Remove screws/rods     BIOPSY  01/01/2008 & 3/2017    mole biopsy, lymph node biopsy     COLONOSCOPY  3/2017    Colonoscopy and GI     ESOPHAGOSCOPY, GASTROSCOPY, DUODENOSCOPY (EGD), COMBINED N/A 8/3/2018    Procedure: COMBINED ESOPHAGOSCOPY, GASTROSCOPY, DUODENOSCOPY (EGD), BIOPSY SINGLE OR MULTIPLE;;  Surgeon: Juan Woodson MD;  Location:  GI     ESOPHAGOSCOPY, GASTROSCOPY, DUODENOSCOPY (EGD), COMBINED N/A 10/22/2018    Procedure: COMBINED ESOPHAGOSCOPY, GASTROSCOPY, DUODENOSCOPY (EGD), BIOPSY SINGLE OR MULTIPLE;  Surgeon: Sadia Carolina MD;  Location:  GI     ESOPHAGOSCOPY, GASTROSCOPY, DUODENOSCOPY (EGD), COMBINED N/A 12/17/2018    Procedure: COMBINED ESOPHAGOSCOPY, GASTROSCOPY, DUODENOSCOPY (EGD);  Surgeon: Juan Woodson MD;  Location: [facility]     NASAL/SINUS POLYPECTOMY  2017    Polyps were benign     TONSILLECTOMY       TONSILLECTOMY  1981     TUBAL LIGATION         Medications:  Current Outpatient Medications   Medication Sig Dispense Refill     ACETAMINOPHEN PO Take 1,000 mg by mouth every 6 hours as  needed for pain Take with tramadol       albuterol (PROAIR HFA/PROVENTIL HFA/VENTOLIN HFA) 108 (90 BASE) MCG/ACT Inhaler Inhale 2 puffs into the lungs every 6 hours as needed        buprenorphine (BUTRANS) 10 MCG/HR WK patch Place 1 patch onto the skin every 7 days 4 patch 1     butalbital-acetaminophen-caffeine (FIORICET/ESGIC) -40 MG tablet TAKE ONE TABLET BY MOUTH EVERY 4 HOURS AS NEEDED. MAX 10 PER MONTH. 10 tablet 0     cetirizine (ZYRTEC) 10 MG tablet Take 10 mg by mouth daily        COMPRESSION STOCKINGS 1 each daily 20-30 mmHg Thigh Length measure and fit, color and style per patient preference. Doff n jose per need. 3 each 3     DULoxetine (CYMBALTA) 30 MG capsule Take 60mg in the morning and 30mg at bedtime 270 capsule 3     eletriptan (RELPAX) 20 MG tablet Take 1 tablet (20 mg) by mouth at onset of headache for migraine May repeat in 2 hours. Max 4 tablets/24 hours. 18 tablet 1     EPINEPHrine (EPIPEN/ADRENACLICK/OR ANY BX GENERIC EQUIV) 0.3 MG/0.3ML injection 2-pack Inject 0.3 mg into the muscle       famotidine (PEPCID) 20 MG tablet Take 1 tablet (20 mg) by mouth At Bedtime 90 tablet 1     fluticasone (FLONASE) 50 MCG/ACT spray INSTILL 2 SPRAYS INTO BOTH NOSTRILS DAILY 48 mL 3     fluticasone-salmeterol (ADVAIR DISKUS) 250-50 MCG/DOSE diskus inhaler Inhale 1 puff into the lungs 2 times daily as needed        levothyroxine (SYNTHROID) 25 MCG tablet Take 1 tablet (25 mcg) by mouth daily 90 tablet 3     linaclotide (LINZESS) 145 MCG capsule Take 1 capsule (145 mcg) by mouth every morning (before breakfast) Last refill until GI clinic follow up. 90 capsule 2     medical cannabis (Patient's own supply.  Not a prescription) Take 1 Dose by mouth See Admin Instructions (This is NOT a prescription, and does not certify that the patient has a qualifying medical condition for medical cannabis.  The purpose of this order is  to document that the patient reports taking medical cannabis.)       metoclopramide  (REGLAN) 10 MG tablet Take 1 tablet (10 mg) by mouth 4 times daily (before meals and nightly) 40 tablet 3     Multiple Vitamins-Minerals (MULTIVITAMIN PO) Take 1 tablet by mouth daily        Nerve Stimulator (CEFALY KIT) KAROLINE 1 Device daily as needed 1 Device 0     omeprazole (PRILOSEC) 40 MG capsule Take 1 capsule (40 mg) by mouth 2 times daily as needed (prn) 180 capsule 2     ondansetron (ZOFRAN) 4 MG tablet Take 4 mg by mouth       ondansetron (ZOFRAN-ODT) 4 MG ODT tab Take 1-2 tablets (4-8 mg) by mouth every 12 hours as needed for nausea 60 tablet 1     oxyCODONE (ROXICODONE) 5 MG tablet Take 0.5 tablets (2.5 mg) by mouth every 6 hours as needed for severe pain 15 tablet 0     Probiotic Product (PROBIOTIC DAILY PO) Take 2 packets by mouth every morning        propranolol ER (INDERAL LA) 120 MG 24 hr capsule Take 1 capsule (120 mg) by mouth daily 30 capsule 11     Propranolol HCl SR Beads 120 MG CP24 Take 120 mg by mouth daily 30 capsule 11     rizatriptan (MAXALT-MLT) 5 MG ODT TAKE 1-2 TABLETS (5-10 MG) BY MOUTH AT ONSET OF HEADACHE FOR MIGRAINE (MAX 30 MG IN 24 HOURS) 9 tablet 11     senna (SENOKOT) 8.6 MG tablet Take 17.2-34.4 mg by mouth       SUMAtriptan (IMITREX STATDOSE) 6 MG/0.5ML pen injector kit Inject 0.5 ml (6 mg) subcutaneously at onset of migraine, May repeat in 1 hour , Max 12 mg/24 hrs 2 kit 1     tiZANidine (ZANAFLEX) 4 MG tablet Take 1 tablet (4 mg) by mouth 4 times daily as needed for muscle spasms 360 tablet 3     traMADol (ULTRAM) 50 MG tablet Take 1 tablet (50 mg) by mouth every 6 hours as needed for severe pain 60 tablet 0     vitamin B complex with vitamin C (VITAMIN  B COMPLEX) TABS tablet Take 1 tablet by mouth daily       VITAMIN D, CHOLECALCIFEROL, PO Take 2,000 Units by mouth daily       ZONISAMIDE PO Take 75 mg by mouth daily            Movement Disorder-related Medications                                                                                                                                                              Allergies: is allergic to bee venom; no clinical screening - see comments; chicken-derived products (egg); compazine [prochlorperazine]; food; gabapentin; gluten meal; pregabalin; seasonal allergies; topiramate; wheat; and wheat bran.    Social History:   Social History     Socioeconomic History     Marital status:      Spouse name: Carry     Number of children: 5     Years of education: None     Highest education level: None   Occupational History     None   Social Needs     Financial resource strain: None     Food insecurity:     Worry: None     Inability: None     Transportation needs:     Medical: None     Non-medical: None   Tobacco Use     Smoking status: Former Smoker     Packs/day: 0.20     Years: 10.00     Pack years: 2.00     Types: Cigarettes     Start date: 1991     Last attempt to quit: 2013     Years since quittin.7     Smokeless tobacco: Never Used     Tobacco comment: I somked on and off during specified dates.   Substance and Sexual Activity     Alcohol use: Yes     Comment: occasional     Drug use: Yes     Types: Marijuana     Comment: medical marijuana     Sexual activity: Yes     Partners: Male     Birth control/protection: Female Surgical   Lifestyle     Physical activity:     Days per week: None     Minutes per session: None     Stress: None   Relationships     Social connections:     Talks on phone: None     Gets together: None     Attends Taoist service: None     Active member of club or organization: None     Attends meetings of clubs or organizations: None     Relationship status: None     Intimate partner violence:     Fear of current or ex partner: None     Emotionally abused: None     Physically abused: None     Forced sexual activity: None   Other Topics Concern     Parent/sibling w/ CABG, MI or angioplasty before 65F 55M? No   Social History Narrative    5 kids: 24, 23, 19, 13, 5 yo     works at  [employer]    Filing for disability       Family History:  Family History   Problem Relation Age of Onset     Connective Tissue Disorder Mother         eds     Connective Tissue Disorder Sister         eds     Cancer Father         Spinal tumor grew into spinal cord, went in brain     Migraines Father      Diabetes Maternal Grandmother         Elderly diabetes     Heart Disease Maternal Grandmother      Hypertension Maternal Grandmother      Diabetes Paternal Grandmother         Elderly diabetes     Diabetes Paternal Grandfather         Elderly diabetes     Asthma Sister         Pediatric Asthma     Migraines Sister      Asthma Son         Pediatric Asthma     Thyroid Disease Sister         ,     Migraines Sister      Migraines Sister      Breast Cancer No family hx of        ROS:  General:  Fever : no, Chills: no, Sweats: no, Fatigue: no, ,Weight loss: no  Cardiovascular  Chest Pains: no, Palpitations: no, Edema: no, Shortness of breath: no  Respiratory  Cough: no  Gastrointestinal  Nausea: no, Vomiting: no, Diarrhea: no, Constipation: no  Genitourinary  Dysuria: no, Frequency: no, Urgency: no,  Nocturia: no, Incontinence: no Women:  Abnormal vaginal bleeding: no  Musculoskeletal  Back pain: no, Neck Pain: no  Joint pain, swelling, redness: no, Stiffness: no  Skin  Rash: no, Itching: no,  Suspicious lesions: no  Psychiatric  Depression: no, Anxiety/Panic: no  Endocrine  Cold intolerance: no, Heat intolerance: no, Excessive thirst: no  Hematologic/LymphatiAbnormal bruising, bleeding: no ,Enlarged lymph nodes: {.:[id number]  Allergic/Immunologic  Hives (Urticaria): no, HIV exposure: no    Neurologic  Visual loss: no, Double vision: no, Headache: no, Loss of consciousness:  no, Seizure: no, Fainting (syncope): no, Dysarthria: no, Dysphagia:  no, Vertigo:  no, Weakness: no, Atrophy: no, Twitches: no, Lhermitte's: no, Numbness or tingling: no, Handwriting change: no, Tremors: no, Involuntary movements: no,  Imbalance: no, Abnormal gait:  no, Falls :no, Memory loss: no, RBD: no, Sleep disorder: no, Hallucination: no, Loss of concentration: no,  Behavioral change: no,  Loss of motivation: no      Comprehensive Neurologic Exam    Mental Status Exam   The patient is alert, oriented and exhibits no difficulty with cognition or memory.  A formal short test of mental status was performed.     Neurovascular         Speech and Language   Right Left     Carotid Bruit Absent Absent  Speech is normal.   Dorsalis Pedis Pulse Normal Normal  Description   Posterior Tibial Pulse Normal Normal  Language is normal.     Cranial Nerve Exam                 Right Left   Right Left   II                                        Normal Normal  Hearing (VIII) Normal Normal      III, IV, V!                   Normal Normal  Nystagmus (VIII) Normal Normal   EOM description                              Gag (IX, X) Normal Normal   Facial Sensation (V) Normal Normal  SCM (XI) Normal Normal   Muscles of Mastication (V) Normal Normal  Trapezius (XI) Normal Normal   Facial Strength (VII) Normal Normal  Tongue (XII) Normal Normal     Motor Exam  Strength (*/5 grading)  Muscle                  Right Left  Muscle Right Left   Frontalis                                           Normal Normal  Iliopsoas Normal    Normal          Orbicularis Oculi                     Normal Normal  Quadrideps Normal    Normal        Orbicularis Oris                         Normal Normal  Anterior Tibial Normal Normal      Deltoid Normal Normal  Gastrocnemius Normal    Normal   Biceps Normal Normal  Extensor Hallucis Longus Normal    Normal   Triceps Normal Normal  Toe Extensors Normal    Normal   EDC Normal Normal  Toe Flexor Normal    Normal   Finger Flexors Normal Normal  Other Normal Normal   First Dorsal Interosseous Normal Normal  Other Normal Normal   Hypothenar Normal Normal  Other Normal Normal   Thenar Normal Normal  Other Normal Normal     Reflexes      Tone   Right  Left   Right Left   Biceps Normal Normal  Spasticity (S)  Rigidity (R)     Triceps Normal Normal  Neck     Brachioradialis Normal Normal  Arm     Quadriceps Normal Normal  Leg     Ankle Normal Normal       Babkinski Flexor Flexor         AMR       Coordination   Right Left   Right Left   Fingers Normal Normal  Finger nose finger Normal Normal   Hand Normal Normal  Drift Normal Normal   Leg Normal Normal  Heel Carrillo Normal Normal   Foot Normal Normal  Other     Other               Involuntary Movements    Gait and Station  None            Right Left  Stand & Sit Normal   (Movement type) Normal Normal  Gait Normal   (Movement type) Normal Normal  Tandem Normal   (Movement type) Normal Normal  Romberg Absent       Sensory Exam          Right Left    Pin Normal Normal    Vibration Normal Normal    Joint position Normal Normal    Other             Coding statement:     Duration of  Services: face-to-face 60 min.   Greater than 50% of this visit was spent in counseling and coordination of care.    Answers for HPI/ROS submitted by the patient on 8/29/2019   General Symptoms: Yes  Skin Symptoms: Yes  HENT Symptoms: Yes  EYE SYMPTOMS: Yes  HEART SYMPTOMS: Yes  LUNG SYMPTOMS: Yes  INTESTINAL SYMPTOMS: Yes  URINARY SYMPTOMS: Yes  GYNECOLOGIC SYMPTOMS: Yes  BREAST SYMPTOMS: No  SKELETAL SYMPTOMS: Yes  BLOOD SYMPTOMS: Yes  NERVOUS SYSTEM SYMPTOMS: Yes  MENTAL HEALTH SYMPTOMS: No  Fever: No  Loss of appetite: Yes  Weight loss: Yes  Fatigue: Yes  Night sweats: Yes  Chills: Yes  Increased stress: No  Excessive hunger: No  Excessive thirst: Yes  Feeling hot or cold when others believe the temperature is normal: Yes  Loss of height: No  Post-operative complications: No  Surgical site pain: Yes  Hallucinations: No  Change in or Loss of Energy: Yes  Hyperactivity: No  Confusion: Yes  Changes in hair: Yes  Changes in moles/birth marks: No  Itching: Yes  Rashes: No  Changes in nails: Yes  Acne: No  Change in facial hair: No  Warts:  No  Non-healing sores: Yes  Scarring: Yes  Flaking of skin: Yes  Color changes of hands/feet in cold : Yes  Sun sensitivity: Yes  Skin thickening: Yes  Ear pain: Yes  Ear discharge: No  Hearing loss: Yes  Tinnitus: Yes  Nosebleeds: No  Congestion: Yes  Sinus pain: Yes  Trouble swallowing: Yes   Voice hoarseness: Yes  Mouth sores: No  Sore throat: Yes  Tooth pain: No  Gum tenderness: No  Bleeding gums: No  Change in taste: Yes  Change in sense of smell: Yes  Dry mouth: Yes  Hearing aid used: No  Neck lump: Yes  Eye pain: Yes  Vision loss: Yes  Dry eyes: Yes  Watery eyes: Yes  Eye bulging: No  Double vision: Yes  Flashing of lights: Yes  Spots: Yes  Floaters: Yes  Redness: Yes  Crossed eyes: No  Tunnel Vision: Yes  Yellowing of eyes: No  Eye irritation: Yes  Cough: Yes  Sputum or phlegm: Yes  Coughing up blood: No  Difficulty breating or shortness of breath: Yes  Snoring: Yes  Wheezing: Yes  Difficulty breathing on exertion: Yes  Nighttime Cough: No  Difficulty breathing when lying flat: Yes  Chest pain or pressure: Yes  Fast or irregular heartbeat: Yes  Pain in legs with walking: Yes  Trouble breathing while lying down: Yes  Fingers or toes appear blue: Yes  High blood pressure: No  Low blood pressure: Yes  Fainting: Yes  Murmurs: No  Pacemaker: No  Varicose veins: No  Edema or swelling: Yes  Wake up at night with shortness of breath: Yes  Light-headedness: Yes  Exercise intolerance: Yes  Heart burn or indigestion: Yes  Nausea: Yes  Vomiting: No  Abdominal pain: Yes  Bloating: Yes  Constipation: Yes  Diarrhea: Yes  Blood in stool: No  Black stools: No  Rectal or Anal pain: No  Fecal incontinence: No  Yellowing of skin or eyes: No  Vomit with blood: No  Change in stools: Yes  Trouble holding urine or incontinence: Yes  Pain or burning: Yes  Trouble starting or stopping: Yes  Increased frequency of urination: Yes  Blood in urine: No  Decreased frequency of urination: No  Frequent nighttime urination: Yes  Flank pain:  Yes  Difficulty emptying bladder: Yes  Back pain: Yes  Muscle aches: Yes  Neck pain: Yes  Swollen joints: Yes  Joint pain: Yes  Bone pain: Yes  Muscle cramps: Yes  Muscle weakness: Yes  Joint stiffness: Yes  Bone fracture: No  Anemia: No  Swollen glands: Yes  Easy bleeding or bruising: Yes  Trouble with coordination: Yes  Dizziness or trouble with balance: Yes  Fainting or black-out spells: Yes  Memory loss: Yes  Headache: Yes  Seizures: No  Speech problems: Yes  Tingling: Yes  Tremor: Yes  Weakness: Yes  Difficulty walking: Yes  Paralysis: No  Numbness: Yes  Bleeding or spotting between periods: No  Heavy or painful periods: Yes  Irregular periods: No  Vaginal discharge: Yes  Hot flashes: No  Vaginal dryness: Yes  Genital ulcers: No  Reduced libido: No  Painful intercourse: No  Difficulty with sexual arousal: No  Post-menopausal bleeding: No    Answers for HPI/ROS submitted by the patient on 8/29/2019   General Symptoms: Yes  Skin Symptoms: Yes  HENT Symptoms: Yes  EYE SYMPTOMS: Yes  HEART SYMPTOMS: Yes  LUNG SYMPTOMS: Yes  INTESTINAL SYMPTOMS: Yes  URINARY SYMPTOMS: Yes  GYNECOLOGIC SYMPTOMS: Yes  BREAST SYMPTOMS: No  SKELETAL SYMPTOMS: Yes  BLOOD SYMPTOMS: Yes  NERVOUS SYSTEM SYMPTOMS: Yes  MENTAL HEALTH SYMPTOMS: No  Fever: No  Loss of appetite: Yes  Weight loss: Yes  Fatigue: Yes  Night sweats: Yes  Chills: Yes  Increased stress: No  Excessive hunger: No  Excessive thirst: Yes  Feeling hot or cold when others believe the temperature is normal: Yes  Loss of height: No  Post-operative complications: No  Surgical site pain: Yes  Hallucinations: No  Change in or Loss of Energy: Yes  Hyperactivity: No  Confusion: Yes  Changes in hair: Yes  Changes in moles/birth marks: No  Itching: Yes  Rashes: No  Changes in nails: Yes  Acne: No  Change in facial hair: No  Warts: No  Non-healing sores: Yes  Scarring: Yes  Flaking of skin: Yes  Color changes of hands/feet in cold : Yes  Sun sensitivity: Yes  Skin thickening:  Yes  Ear pain: Yes  Ear discharge: No  Hearing loss: Yes  Tinnitus: Yes  Nosebleeds: No  Congestion: Yes  Sinus pain: Yes  Trouble swallowing: Yes   Voice hoarseness: Yes  Mouth sores: No  Sore throat: Yes  Tooth pain: No  Gum tenderness: No  Bleeding gums: No  Change in taste: Yes  Change in sense of smell: Yes  Dry mouth: Yes  Hearing aid used: No  Neck lump: Yes  Eye pain: Yes  Vision loss: Yes  Dry eyes: Yes  Watery eyes: Yes  Eye bulging: No  Double vision: Yes  Flashing of lights: Yes  Spots: Yes  Floaters: Yes  Redness: Yes  Crossed eyes: No  Tunnel Vision: Yes  Yellowing of eyes: No  Eye irritation: Yes  Cough: Yes  Sputum or phlegm: Yes  Coughing up blood: No  Difficulty breating or shortness of breath: Yes  Snoring: Yes  Wheezing: Yes  Difficulty breathing on exertion: Yes  Nighttime Cough: No  Difficulty breathing when lying flat: Yes  Chest pain or pressure: Yes  Fast or irregular heartbeat: Yes  Pain in legs with walking: Yes  Trouble breathing while lying down: Yes  Fingers or toes appear blue: Yes  High blood pressure: No  Low blood pressure: Yes  Fainting: Yes  Murmurs: No  Pacemaker: No  Varicose veins: No  Edema or swelling: Yes  Wake up at night with shortness of breath: Yes  Light-headedness: Yes  Exercise intolerance: Yes  Heart burn or indigestion: Yes  Nausea: Yes  Vomiting: No  Abdominal pain: Yes  Bloating: Yes  Constipation: Yes  Diarrhea: Yes  Blood in stool: No  Black stools: No  Rectal or Anal pain: No  Fecal incontinence: No  Yellowing of skin or eyes: No  Vomit with blood: No  Change in stools: Yes  Trouble holding urine or incontinence: Yes  Pain or burning: Yes  Trouble starting or stopping: Yes  Increased frequency of urination: Yes  Blood in urine: No  Decreased frequency of urination: No  Frequent nighttime urination: Yes  Flank pain: Yes  Difficulty emptying bladder: Yes  Back pain: Yes  Muscle aches: Yes  Neck pain: Yes  Swollen joints: Yes  Joint pain: Yes  Bone pain:  Yes  Muscle cramps: Yes  Muscle weakness: Yes  Joint stiffness: Yes  Bone fracture: No  Anemia: No  Swollen glands: Yes  Easy bleeding or bruising: Yes  Trouble with coordination: Yes  Dizziness or trouble with balance: Yes  Fainting or black-out spells: Yes  Memory loss: Yes  Headache: Yes  Seizures: No  Speech problems: Yes  Tingling: Yes  Tremor: Yes  Weakness: Yes  Difficulty walking: Yes  Paralysis: No  Numbness: Yes  Bleeding or spotting between periods: No  Heavy or painful periods: Yes  Irregular periods: No  Vaginal discharge: Yes  Hot flashes: No  Vaginal dryness: Yes  Genital ulcers: No  Reduced libido: No  Painful intercourse: No  Difficulty with sexual arousal: No  Post-menopausal bleeding: No

## 2019-09-04 NOTE — PROCEDURES
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  EEG is being done to evaluate for seizures.        MEDICATIONS:  The patient is not on an antiepileptic medication.     TECHNICAL SUMMARY: This video EEG monitoring procedure was performed with 23 scalp electrodes in 10-20 system placements, and additional scalp, precordial and other surface electrodes used for electrical referencing and artifact detection. Video was reviewed intermittently by EEG technologist and physician for electroclinical seizures.     BACKGROUND ACTIVITY:  During wakefulness, the background activity consists of synchronous and symmetric, well modulated, 9-10 Hz posterior dominant rhythm. The posterior dominant rhythm attenuated with eye opening. During drowsiness, the background activity waxed and waned and there were periods of slowing and attenuation of the posterior alpha rhythm. Stage I sleep and Stage II sleep was recorded in which synchronous and symmetrical vertex waves , K-complexes and sleep spindles  were identified.  No focal abnormalities were observed.     ACTIVATION PROCEDURES:  Photic stimulation is completed with no significant abnormalities.  After 2 minutes of hyperventilation, the patient had a spell which is described below.  During hyperventilation, there were no significant EEG abnormalities.      EPILEPTIFORM DISCHARGES:  None.      EVENT:  The patient had an event after 2 minutes of hyperventilation at 13:56.  Patient was able to follow commands initially, but then at 13:56:38, she continued to hyperventilate, but she had some body stiffening.  The patient kept pressing event button through her hyperventilation, and it seemed  that she was continued to follow some commands.  I am not sure if this is a target spell.  The patient did not seem very uncomfortable and at 14:24 patient reported some left hand numbness.  None of these spells had EEG correlate to suggest they are seizures.      IMPRESSION:  Awake and drowsy EEG is within normal limits.  During hyperventilation, the patient appeared to have some subtle body stiffening but continued to follow commands and participate in hyperventilation.  This was not a seizure and it just seemed based on the video that she was trying very hard to participate in the hyperventilation activation process.  At another event time, patient reported some left hand numbness, which again was not correlated with EEG changes to suggest any seizure activity.  No electrographic seizures or epileptiform discharges were seen in the record.         ISAK VILLATORO MD             D: 2019   T: 2019   MT: AKA      Name:     PANCHO HENAO   MRN:      [id number]        Account:        [id number]   :      1970           Procedure Date: 2019      Document: [id number]

## 2019-09-07 DIAGNOSIS — E03.9 HYPOTHYROIDISM, UNSPECIFIED TYPE: Primary | ICD-10-CM

## 2019-09-10 RX ORDER — LEVOTHYROXINE SODIUM 25 UG/1
25 TABLET ORAL DAILY
Qty: 90 TABLET | Refills: 2 | Status: SHIPPED | OUTPATIENT
Start: 2019-09-10 | End: 2020-05-28

## 2019-09-13 ENCOUNTER — HOSPITAL ENCOUNTER (OUTPATIENT)
Dept: GENERAL RADIOLOGY | Facility: CLINIC | Age: 49
Discharge: HOME | End: 2019-09-13
Attending: PSYCHIATRY & NEUROLOGY | Admitting: PSYCHIATRY & NEUROLOGY
Payer: COMMERCIAL

## 2019-09-13 ENCOUNTER — HOSPITAL ENCOUNTER (OUTPATIENT)
Facility: CLINIC | Age: 49
Discharge: HOME | End: 2019-09-13
Payer: COMMERCIAL

## 2019-09-13 VITALS
TEMPERATURE: 97.9 F | SYSTOLIC BLOOD PRESSURE: 119 MMHG | DIASTOLIC BLOOD PRESSURE: 66 MMHG | RESPIRATION RATE: 16 BRPM | OXYGEN SATURATION: 96 % | HEART RATE: 63 BPM

## 2019-09-13 DIAGNOSIS — G89.29 CHRONIC INTRACTABLE HEADACHE, UNSPECIFIED HEADACHE TYPE: Primary | ICD-10-CM

## 2019-09-13 DIAGNOSIS — G96.00 CEREBROSPINAL FLUID LEAK: ICD-10-CM

## 2019-09-13 DIAGNOSIS — R51.9 CHRONIC INTRACTABLE HEADACHE, UNSPECIFIED HEADACHE TYPE: Primary | ICD-10-CM

## 2019-09-13 PROCEDURE — 25000125 ZZHC RX 250: Performed by: PHYSICIAN ASSISTANT

## 2019-09-13 PROCEDURE — 40000141 ZZH STATISTIC PERIPHERAL IV START W/O US GUIDANCE

## 2019-09-13 PROCEDURE — 40000863 ZZH STATISTIC RADIOLOGY XRAY, US, CT, MAR, NM

## 2019-09-13 PROCEDURE — 25500064 ZZH RX 255 OP 636: Performed by: PHYSICIAN ASSISTANT

## 2019-09-13 PROCEDURE — 27210986 XR BLOOD PATCH PROCEDURE

## 2019-09-13 RX ORDER — NICOTINE POLACRILEX 4 MG
15-30 LOZENGE BUCCAL
Status: DISCONTINUED | OUTPATIENT
Start: 2019-09-13 | End: 2019-09-13 | Stop reason: HOSPADM

## 2019-09-13 RX ORDER — LIDOCAINE 40 MG/G
CREAM TOPICAL
Status: DISCONTINUED | OUTPATIENT
Start: 2019-09-13 | End: 2019-09-13 | Stop reason: HOSPADM

## 2019-09-13 RX ORDER — LIDOCAINE HYDROCHLORIDE 10 MG/ML
5 INJECTION, SOLUTION EPIDURAL; INFILTRATION; INTRACAUDAL; PERINEURAL ONCE
Status: COMPLETED | OUTPATIENT
Start: 2019-09-13 | End: 2019-09-13

## 2019-09-13 RX ORDER — DEXTROSE MONOHYDRATE 25 G/50ML
25-50 INJECTION, SOLUTION INTRAVENOUS
Status: DISCONTINUED | OUTPATIENT
Start: 2019-09-13 | End: 2019-09-13 | Stop reason: HOSPADM

## 2019-09-13 RX ORDER — IOPAMIDOL 408 MG/ML
10 INJECTION, SOLUTION INTRATHECAL ONCE
Status: COMPLETED | OUTPATIENT
Start: 2019-09-13 | End: 2019-09-13

## 2019-09-13 RX ADMIN — LIDOCAINE HYDROCHLORIDE 3 ML: 10 INJECTION, SOLUTION EPIDURAL; INFILTRATION; INTRACAUDAL; PERINEURAL at 14:38

## 2019-09-13 RX ADMIN — IOPAMIDOL 3 ML: 408 INJECTION, SOLUTION INTRATHECAL at 14:40

## 2019-09-13 NOTE — DISCHARGE INSTRUCTIONS
Blood Patch Discharge Instructions     After you go home:      You may resume your normal diet    Continue to drink at least 8 ounces of fluid every 1-2 hours until bedtime tonight and continue to drink extra fluids for the next 2 days    Caffeinated beverages may help prevent or reduce spinal headaches    Care of Puncture Site:      If there is a bandaid - you may remove it tomorrow morning    You may shower tomorrow    No tub baths, whirlpools or swimming for 48 hours     Activity - to help prevent spinal headache or spinal fluid leakage:      Minimize your activity today. Flat bedrest for 24 hrs is strongly suggested as this will help to prevent a spinal headache.  You can be up to the bathroom and for meals.    Resume normal activities tomorrow.     Avoid strenuous activity for the next 2 days    Do not drive a vehicle until tomorrow morning    Medicines:      You may resume all medications    For minor pain, you may take Acetaminophen (Tylenol) or Ibuprofen (Advil)            Call the provider who ordered this procedure if:      Your headache becomes worse or is severe. (A minor headache is not unusual)    You have nausea or vomiting    The site is red, swollen, hot or tender    You have chills or a fever greater than 101 F (38 C)    Any questions or concerns    If you have questions call:        [facility] Radiology Dept @ [phone]    The provider who performed your procedure was _________________.

## 2019-09-13 NOTE — PROCEDURES
[facility]    Procedure: Epidural Blood Patch  Date/Time: 9/13/2019 2:59 PM  Performed by: Jeri Farooq PA-C  Authorized by: Jeri Farooq PA-C     UNIVERSAL PROTOCOL   Site Marked: Yes  Prior Images Obtained and Reviewed:  Yes  Required items: Required blood products, implants, devices and special equipment available    Patient identity confirmed:  Verbally with patient  NA - No sedation, light sedation, or local anesthesia  Confirmation Checklist:  Patient's identity using two indicators, relevant allergies, procedure was appropriate and matched the consent or emergent situation and correct equipment/implants were available  Time out: Immediately prior to the procedure a time out was called    Preparation: Patient was prepped and draped in usual sterile fashion    ESBL (mL):  1     ANESTHESIA    Anesthesia: Local infiltration  Local Anesthetic:  Lidocaine 1% without epinephrine  Anesthetic Total (mL):  3      SEDATION    Patient Sedated: No    See dictated procedure note for full details.  PROCEDURE   Patient Tolerance:  Patient tolerated the procedure well with no immediate complications  Describe Procedure: Epidural Blood Patch Performed. 20mL autologous blood injected.  Time of Sedation in Minutes by Physician:  0

## 2019-09-13 NOTE — PROGRESS NOTES
Care Suites Admission Nursing Note    Reason for admission: Blood patch  CS arrival time: 1320  Accompanied by: Rebekah  Name/phone of DC : [phone]  Medications held: None  Consent signed: with PA at time of procedure  Abnormal assessment/labs: pt's complicated medical history  If abnormal, provider notified: Aware  Education/questions answered: discharge instructions reviewed with pt and  and they verbalize understanding  Plan: Home with  after procedure completed.    Care Suites Discharge Nursing Note    Education/questions answered: Yes  Patient DC location: Home  Accompanied by:   CS discharge time: 1530. Pt has pain meds at home and PA agrees she can rest at home and take her meds and ice to back.

## 2019-09-13 NOTE — PROGRESS NOTES
To xray via cart for blood patch at 1420 with Jeri PALACIOS.  Pt tolerated procedure with some pain with injection of blood. 20 ml total injected.

## 2019-09-17 ENCOUNTER — HOSPITAL ENCOUNTER (OUTPATIENT)
Facility: CLINIC | Age: 49
Discharge: HOME | End: 2019-09-17
Attending: INTERNAL MEDICINE | Admitting: INTERNAL MEDICINE
Payer: COMMERCIAL

## 2019-09-17 PROCEDURE — 91038 ESOPH IMPED FUNCT TEST > 1HR: CPT | Performed by: INTERNAL MEDICINE

## 2019-09-17 NOTE — OR NURSING
Pt here for 24 hr ph test only as she had manometry done in February 2019. Ph catheter placed with some difficulty , attempting both R and L nare. Able to pass catheter via R nare with pt drinking warm water. Catheter placed at 32 cm for study. Diary and discharge instructions reviewed and given to pt. She will return tomorrow to have catheter removed. Pt discharged without complaints.

## 2019-09-17 NOTE — DISCHARGE INSTRUCTIONS
Motility Discharge Instructions    1. Resume regular diet.  2. You may have a bloody nose or sore throat after the procedure.  3. If you had a 24 hour probe placed, return  the next day to have the probe removed.  Removal will take 5 minutes.  4. If you have questions call [phone] from 7:00am-4:30pm.  For after hours questions call GI doctor on call at [phone].    Ellyn Kim RN

## 2019-09-19 ENCOUNTER — OFFICE VISIT (OUTPATIENT)
Dept: CARDIOLOGY | Facility: CLINIC | Age: 49
End: 2019-09-19
Attending: INTERNAL MEDICINE
Payer: COMMERCIAL

## 2019-09-19 VITALS — BODY MASS INDEX: 30.49 KG/M2 | WEIGHT: 194.3 LBS | OXYGEN SATURATION: 99 % | HEIGHT: 67 IN

## 2019-09-19 DIAGNOSIS — R07.2 PRECORDIAL PAIN: ICD-10-CM

## 2019-09-19 DIAGNOSIS — G90.A POTS (POSTURAL ORTHOSTATIC TACHYCARDIA SYNDROME): ICD-10-CM

## 2019-09-19 DIAGNOSIS — G90.9 AUTONOMIC DYSFUNCTION: Primary | ICD-10-CM

## 2019-09-19 PROCEDURE — G0463 HOSPITAL OUTPT CLINIC VISIT: HCPCS | Mod: 25,ZF

## 2019-09-19 PROCEDURE — 99214 OFFICE O/P EST MOD 30 MIN: CPT | Mod: ZP | Performed by: INTERNAL MEDICINE

## 2019-09-19 ASSESSMENT — ENCOUNTER SYMPTOMS
RECTAL PAIN: 0
WEIGHT GAIN: 0
HOARSE VOICE: 1
POSTURAL DYSPNEA: 1
NAUSEA: 1
WEIGHT LOSS: 1
COUGH: 1
HYPOTENSION: 1
SPEECH CHANGE: 1
EYE IRRITATION: 1
DOUBLE VISION: 1
SWOLLEN GLANDS: 1
SEIZURES: 0
CHILLS: 1
SINUS CONGESTION: 1
STIFFNESS: 1
PARALYSIS: 0
BLOOD IN STOOL: 0
SKIN CHANGES: 0
SORE THROAT: 1
DIARRHEA: 1
BLOATING: 1
TASTE DISTURBANCE: 1
EYE REDNESS: 1
EYE PAIN: 1
CONSTIPATION: 1
FATIGUE: 1
SYNCOPE: 0
BRUISES/BLEEDS EASILY: 1
HEADACHES: 1
PALPITATIONS: 1
MEMORY LOSS: 1
MYALGIAS: 1
FLANK PAIN: 1
TROUBLE SWALLOWING: 1
SNORES LOUDLY: 1
ARTHRALGIAS: 1
POLYDIPSIA: 1
DIZZINESS: 1
DECREASED APPETITE: 1
MUSCLE WEAKNESS: 1
NIGHT SWEATS: 1
ORTHOPNEA: 1
SLEEP DISTURBANCES DUE TO BREATHING: 1
JAUNDICE: 0
MUSCLE CRAMPS: 1
TINGLING: 1
HEMOPTYSIS: 0
ABDOMINAL PAIN: 1
FEVER: 0
DYSPNEA ON EXERTION: 1
INCREASED ENERGY: 1
DIFFICULTY URINATING: 1
ALTERED TEMPERATURE REGULATION: 1
POOR WOUND HEALING: 1
HYPERTENSION: 0
BACK PAIN: 1
EYE WATERING: 1
NECK PAIN: 1
NECK MASS: 1
HEARTBURN: 1
BOWEL INCONTINENCE: 0
NAIL CHANGES: 0
DYSURIA: 0
VOMITING: 0
NUMBNESS: 1
DISTURBANCES IN COORDINATION: 1
HALLUCINATIONS: 0
SPUTUM PRODUCTION: 1
WHEEZING: 1
SMELL DISTURBANCE: 1
JOINT SWELLING: 1
EXERCISE INTOLERANCE: 1
SHORTNESS OF BREATH: 1
TREMORS: 1
POLYPHAGIA: 0
HEMATURIA: 0
SINUS PAIN: 1
WEAKNESS: 1
LOSS OF CONSCIOUSNESS: 1
LIGHT-HEADEDNESS: 1
LEG PAIN: 1
COUGH DISTURBING SLEEP: 1

## 2019-09-19 ASSESSMENT — MIFFLIN-ST. JEOR: SCORE: 1548.97

## 2019-09-19 ASSESSMENT — PAIN SCALES - GENERAL: PAINLEVEL: NO PAIN (0)

## 2019-09-19 NOTE — PATIENT INSTRUCTIONS
You were seen in the Electrophysiology Clinic today by: Dr. Russell    Plan:     Medication Changes: None    Labs/Tests Needed: Lexiscan, Mat Monitor    Follow up visit: 2 months with Dr. Russell    Your Care Team:  EP Cardiology   Telephone Number     Jeri Bravo RN [phone]     For scheduling appts or procedures:    Arlyn Amezcua   [phone]   For the Device Clinic (Pacemakers, ICDs, Loop Recorders)    During business hours: [phone]  After business hours:   [phone]- select option 4 and ask for job code [id number].       Cardiovascular Clinic:   [address] [place] [address]. [place] [address]      As always, Thank you for trusting us with your health care needs!

## 2019-09-19 NOTE — LETTER
9/19/2019      RE: Ada Welch  [address]  [place] [address]       Dear Colleague,    Thank you for the opportunity to participate in the care of your patient, Ada Welch, at the [facility] at [facility]. Please see a copy of my visit note below.    HPI: Ms. Scar Welch is a 48 year-old female who presents  for follow up of autonomic dysfuction.  The patient has a past medical history significant for dysautonomia, Tom-Danlos,    At today's visit her orthostatic blood pressures were quite stable ranging from 102/77 supine to 113/80 5:08 minutes of standing.  The corresponding heart rates were 77 and 90 beats per per minute respectively.    Since her last visit Ada notes that she continues to have heart rate fluctuations.  She has apple watch and has documented on 2 weekends this past summer while traveling heart rates in the 100s 60 bpm range.  The most well-documented occurred at night and lasted about 3 hours.  The possibility of intermittent SVT should be considered,     Apart from the the rapid rates she has also documented heart rates in the mid 40s on her apple watch.  Whether these represent true bradycardia arrhythmic episodes during the day or potentially the presence of ectopic beats that are not detected cannot be stated this time.  External monitoring will be undertaken once again although in the past it is only showing sinus tachycardia.    During periods of low heart rate definitely does report feeling hot and sweaty.  However she has similar symptoms when she feels that her heart is going fast.  Potentially she may be experiencing some vasovagal episodes of uncertain trigger.  On the other hand the occurrence of similar symptoms during tachycardias hard to explain.  She has not had any faints or falls.    In addition to the heart rate issues Ada does report some chest pain across the upper part of her chest.  It is hard to  tell whether this may be related to musculoskeletal issues.  Nonetheless a Lexiscan could be helpful in providing some reassurance that it is not cardiac if the test is negative.    A final concern on Ada is part is the potential for aorta disease given her diagnosis of Tom-Danlos.  I did review the past laboratory findings and noted that she had a normal aorta on echocardiogram and June 2018.  Consequently we will wait another year before repeating the echocardiogram.  Incidentally her left ventricular function was normal.    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     Chronic sinusitis 00/2007    Diagnosed with chronic pansinusitis 2016     Chronic tonsillitis 1980    Had tonsils removed 02/1981, rheumatic fever     Tom-Danlos disease      Eosinophilic esophagitis 08/2018     Gastroesophageal reflux disease 3/1/2017    I have large hiatal hernia     Hernia, abdominal 10/2016    Hiatal Hernia     Hiatal hernia 2016    Have adeline hiatel hernia     Hoarseness Unsure    It comes and goes     IBS (irritable bowel syndrome) with constipation     improved on Linzess     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     Migraines 1/1/2007    Unsure of exact date     Nasal polyps 2013    Were revoved 03/2017, benign     Other nervous system complications 1/2014    Autonomic nervous system disorder, neuralgia, neuropathy     Pneumonia Unsure    Have had pneumonia several times     Swelling, mass, or lump in head and neck 08/2016    Lymph node has been growing for last year     Thyroid disease 01/01/2008       CURRENT MEDICATIONS:  Current Outpatient Medications   Medication Sig Dispense Refill      ACETAMINOPHEN PO Take 1,000 mg by mouth every 6 hours as needed for pain Take with tramadol       albuterol (PROAIR HFA/PROVENTIL HFA/VENTOLIN HFA) 108 (90 BASE) MCG/ACT Inhaler Inhale 2 puffs into the lungs every 6 hours as needed        buprenorphine (BUTRANS) 10 MCG/HR WK patch Place 1 patch onto the skin every 7 days 4 patch 1     butalbital-acetaminophen-caffeine (FIORICET/ESGIC) -40 MG tablet TAKE ONE TABLET BY MOUTH EVERY 4 HOURS AS NEEDED. MAX 10 PER MONTH. 10 tablet 0     cetirizine (ZYRTEC) 10 MG tablet Take 10 mg by mouth daily        COMPRESSION STOCKINGS 1 each daily 20-30 mmHg Thigh Length measure and fit, color and style per patient preference. Doff n jose per need. 3 each 3     DULoxetine (CYMBALTA) 30 MG capsule Take 60mg in the morning and 30mg at bedtime 270 capsule 3     eletriptan (RELPAX) 20 MG tablet Take 1 tablet (20 mg) by mouth at onset of headache for migraine May repeat in 2 hours. Max 4 tablets/24 hours. 18 tablet 1     EPINEPHrine (EPIPEN/ADRENACLICK/OR ANY BX GENERIC EQUIV) 0.3 MG/0.3ML injection 2-pack Inject 0.3 mg into the muscle       famotidine (PEPCID) 20 MG tablet Take 1 tablet (20 mg) by mouth At Bedtime 90 tablet 1     fluticasone (FLONASE) 50 MCG/ACT spray INSTILL 2 SPRAYS INTO BOTH NOSTRILS DAILY 48 mL 3     fluticasone-salmeterol (ADVAIR DISKUS) 250-50 MCG/DOSE diskus inhaler Inhale 1 puff into the lungs 2 times daily as needed        levothyroxine (SYNTHROID/LEVOTHROID) 25 MCG tablet Take 1 tablet (25 mcg) by mouth daily 90 tablet 2     metoclopramide (REGLAN) 10 MG tablet Take 1 tablet (10 mg) by mouth 4 times daily (before meals and nightly) 40 tablet 3     Multiple Vitamins-Minerals (MULTIVITAMIN PO) Take 1 tablet by mouth daily        omeprazole (PRILOSEC) 40 MG capsule Take 1 capsule (40 mg) by mouth 2 times daily as needed (prn) 180 capsule 2     ondansetron (ZOFRAN-ODT) 4 MG ODT tab Take 1-2 tablets (4-8 mg) by mouth every 12 hours as needed for nausea 60  tablet 1     oxyCODONE (ROXICODONE) 5 MG tablet Take 0.5 tablets (2.5 mg) by mouth every 6 hours as needed for severe pain 15 tablet 0     Probiotic Product (PROBIOTIC DAILY PO) Take 2 packets by mouth every morning        propranolol ER (INDERAL LA) 120 MG 24 hr capsule Take 1 capsule (120 mg) by mouth daily 30 capsule 11     rizatriptan (MAXALT-MLT) 5 MG ODT TAKE 1-2 TABLETS (5-10 MG) BY MOUTH AT ONSET OF HEADACHE FOR MIGRAINE (MAX 30 MG IN 24 HOURS) 9 tablet 11     SUMAtriptan (IMITREX STATDOSE) 6 MG/0.5ML pen injector kit Inject 0.5 ml (6 mg) subcutaneously at onset of migraine, May repeat in 1 hour , Max 12 mg/24 hrs 2 kit 1     tiZANidine (ZANAFLEX) 4 MG tablet Take 1 tablet (4 mg) by mouth 4 times daily as needed for muscle spasms 360 tablet 3     traMADol (ULTRAM) 50 MG tablet Take 1 tablet (50 mg) by mouth every 6 hours as needed for severe pain 60 tablet 0     vitamin B complex with vitamin C (VITAMIN  B COMPLEX) TABS tablet Take 1 tablet by mouth daily       VITAMIN D, CHOLECALCIFEROL, PO Take 2,000 Units by mouth daily       linaclotide (LINZESS) 145 MCG capsule Take 1 capsule (145 mcg) by mouth every morning (before breakfast) Last refill until GI clinic follow up. (Patient not taking: Reported on 9/19/2019) 90 capsule 2     medical cannabis (Patient's own supply.  Not a prescription) Take 1 Dose by mouth See Admin Instructions (This is NOT a prescription, and does not certify that the patient has a qualifying medical condition for medical cannabis.  The purpose of this order is  to document that the patient reports taking medical cannabis.)       Nerve Stimulator (CEFALY KIT) KAROLINE 1 Device daily as needed (Patient not taking: Reported on 9/19/2019) 1 Device 0     senna (SENOKOT) 8.6 MG tablet Take 17.2-34.4 mg by mouth       ZONISAMIDE PO Take 75 mg by mouth daily         PAST SURGICAL HISTORY:  Past Surgical History:   Procedure Laterality Date     ADENOIDECTOMY  1981     AS REPAIR OF NASAL SEPTUM        BACK SURGERY  12/09/2013  10/01/2014    Spinal fusion L4-S1, L5 moving 5/8ths in. Remove screws/rods     BIOPSY  01/01/2008 & 3/2017    mole biopsy, lymph node biopsy     COLONOSCOPY  3/2017    Colonoscopy and GI     ESOPHAGEAL IMPEDENCE FUNCTION TEST WITH 24 HOUR PH GREATER THAN 1 HOUR N/A 9/17/2019    Procedure: IMPEDANCE PH STUDY, ESOPHAGUS, 24 HOUR;  Surgeon: Raghavendra Grande MD;  Location:  GI     ESOPHAGOSCOPY, GASTROSCOPY, DUODENOSCOPY (EGD), COMBINED N/A 8/3/2018    Procedure: COMBINED ESOPHAGOSCOPY, GASTROSCOPY, DUODENOSCOPY (EGD), BIOPSY SINGLE OR MULTIPLE;;  Surgeon: Juan Woodson MD;  Location:  GI     ESOPHAGOSCOPY, GASTROSCOPY, DUODENOSCOPY (EGD), COMBINED N/A 10/22/2018    Procedure: COMBINED ESOPHAGOSCOPY, GASTROSCOPY, DUODENOSCOPY (EGD), BIOPSY SINGLE OR MULTIPLE;  Surgeon: Sadia Carolina MD;  Location:  GI     ESOPHAGOSCOPY, GASTROSCOPY, DUODENOSCOPY (EGD), COMBINED N/A 12/17/2018    Procedure: COMBINED ESOPHAGOSCOPY, GASTROSCOPY, DUODENOSCOPY (EGD);  Surgeon: Juan Woodson MD;  Location:  GI     NASAL/SINUS POLYPECTOMY  2017    Polyps were benign     TONSILLECTOMY       TONSILLECTOMY  1981     TUBAL LIGATION         ALLERGIES:     Allergies   Allergen Reactions     Bee Venom Anaphylaxis, Difficulty breathing, Palpitations, Shortness Of Breath and Visual Disturbance     Patient does carry Epi-Pen     No Clinical Screening - See Comments Anaphylaxis     Chicken-Derived Products (Egg) Diarrhea and Nausea     Patient will self monitor  Other reaction(s): Nausea  Patient will self monitor  Other reaction(s): GI intolerance     Compazine [Prochlorperazine] Other (See Comments)     Extreme jittery     Food      Milk     Gabapentin      Gluten Meal Other (See Comments)     Pregabalin Other (See Comments)     Seasonal Allergies Unknown     Dust, mold     Topiramate      Wheat Diarrhea     Wheat Bran Nausea     Patient will self monitor  Other reaction(s): Nausea  Patient will self  "monitor       FAMILY HISTORY:  Family History   Problem Relation Age of Onset     Connective Tissue Disorder Mother         eds     Connective Tissue Disorder Sister         eds     Cancer Father         Spinal tumor grew into spinal cord, went in brain     Migraines Father      Diabetes Maternal Grandmother         Elderly diabetes     Heart Disease Maternal Grandmother      Hypertension Maternal Grandmother      Diabetes Paternal Grandmother         Elderly diabetes     Diabetes Paternal Grandfather         Elderly diabetes     Asthma Sister         Pediatric Asthma     Migraines Sister      Asthma Son         Pediatric Asthma     Thyroid Disease Sister         ,     Migraines Sister      Migraines Sister      Breast Cancer No family hx of      - Premature coronary artery disease  - Atrial fibrillation  - Sudden cardiac death     SOCIAL HISTORY:  Social History     Tobacco Use     Smoking status: Former Smoker     Packs/day: 0.20     Years: 10.00     Pack years: 2.00     Types: Cigarettes     Start date: 1991     Last attempt to quit: 2013     Years since quittin.8     Smokeless tobacco: Never Used     Tobacco comment: I somked on and off during specified dates.   Substance Use Topics     Alcohol use: Yes     Comment: occasional     Drug use: Yes     Types: Marijuana     Comment: medical marijuana       ROS:   Constitutional: No fever, chills, or sweats. Weight stable.   ENT: No visual disturbance, ear ache, epistaxis, sore throat.   Cardiovascular: As per HPI.   Respiratory: No cough, hemoptysis.    GI: No nausea, vomiting, hematemesis, melena, or hematochezia.   : No hematuria.   Integument: Negative.   Psychiatric: Negative.   Hematologic:  Easy bruising, no easy bleeding.  Neuro: Negative.   Endocrinology: No significant heat or cold intolerance   Musculoskeletal: No myalgia.    Exam:  Ht 1.702 m (5' 7\")   Wt 88.1 kg (194 lb 4.8 oz)   SpO2 99%   BMI 30.43 kg/m     GENERAL APPEARANCE: healthy, " alert and no distress  HEENT: no icterus, no xanthelasmas, normal pupil size and reaction, normal palate, mucosa moist, no central cyanosis  NECK: no adenopathy, no asymmetry, masses, or scars, thyroid normal to palpation and no bruits, JVP not elevated  RESPIRATORY: lungs clear to auscultation - no rales, rhonchi or wheezes, no use of accessory muscles, no retractions, respirations are unlabored, normal respiratory rate  CARDIOVASCULAR: regular rhythm, normal S1 with physiologic split S2, no S3 or S4 and no murmur, click or rub, precordium quiet with normal PMI.  ABDOMEN: soft, non tender, without hepatosplenomegaly, no masses palpable, bowel sounds normal, aorta not enlarged by palpation, no abdominal bruits  EXTREMITIES: peripheral pulses normal, no edema, no bruits  NEURO: alert and oriented to person/place/time, normal speech, gait and affect  VASC: Radial, femoral, dorsalis pedis and posterior tibialis pulses are normal in volumes and symmetric bilaterally. No bruits are heard.  SKIN: no ecchymoses, no rashes    Labs:  CBC RESULTS:   Lab Results   Component Value Date    WBC 8.9 03/03/2019    RBC 4.91 03/03/2019    HGB 13.7 03/03/2019    HCT 41.6 03/03/2019    MCV 85 03/03/2019    MCH 27.9 03/03/2019    MCHC 32.9 03/03/2019    RDW 13.7 03/03/2019     03/03/2019       BMP RESULTS:  Lab Results   Component Value Date     03/03/2019    POTASSIUM 3.4 03/03/2019    CHLORIDE 102 03/03/2019    CO2 25 03/03/2019    ANIONGAP 10 03/03/2019    GLC 95 03/03/2019    BUN 9 03/03/2019    CR 0.82 03/03/2019    GFRESTIMATED 86 03/03/2019    GFRESTBLACK >90 03/03/2019    JUAN 8.6 03/03/2019        INR RESULTS:  Lab Results   Component Value Date    INR 0.97 02/28/2019    INR 0.90 12/17/2018    INR 0.96 07/06/2018    INR 1.01 07/25/2017       Procedures:  PULMONARY FUNCTION TESTS:   No flowsheet data found.      ECHOCARDIOGRAM:   No results found for this or any previous visit (from the past 5060  hour(s)).      Assessment and Plan:     #1. Autonomic dysfunction    2.  Tachycardia and bradycardia that appear to occur at random times.  Cardiac monitoring will be reinitiated using CardioNet for a couple weeks to try and get a better handle on the patient's symptoms and associated rhythm.    3.  Chest pain which is most likely musculoskeletal.  A Lexiscan will be obtained.    4.  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  An echo 1 year ago did not reveal any problem.  Surveillance every couple years should be appropriate.    5.  Clinic follow-up in 4 to 6 weeks after test results are obtained.     I very much appreciated the opportunity to see and assess Ms Ada Welch in the clinic  Today    I agree with the note above which summarizes my findings and current recommendations    Please do not hesitate to contact my office if you have any questions or concerns.      Kirk Russell MD  Cardiac Arrhythmia Service  [facility]  [phone]     CC  [facility]

## 2019-09-19 NOTE — PROGRESS NOTES
HPI: Ms. Scar Welch is a 48 year-old female who presents  for follow up of autonomic dysfuction.  The patient has a past medical history significant for dysautonomia, Tom-Danlos,    At today's visit her orthostatic blood pressures were quite stable ranging from 102/77 supine to 113/80 5:08 minutes of standing.  The corresponding heart rates were 77 and 90 beats per per minute respectively.    Since her last visit Ada notes that she continues to have heart rate fluctuations.  She has apple watch and has documented on 2 weekends this past summer while traveling heart rates in the 100s 60 bpm range.  The most well-documented occurred at night and lasted about 3 hours.  The possibility of intermittent SVT should be considered,     Apart from the the rapid rates she has also documented heart rates in the mid 40s on her apple watch.  Whether these represent true bradycardia arrhythmic episodes during the day or potentially the presence of ectopic beats that are not detected cannot be stated this time.  External monitoring will be undertaken once again although in the past it is only showing sinus tachycardia.    During periods of low heart rate definitely does report feeling hot and sweaty.  However she has similar symptoms when she feels that her heart is going fast.  Potentially she may be experiencing some vasovagal episodes of uncertain trigger.  On the other hand the occurrence of similar symptoms during tachycardias hard to explain.  She has not had any faints or falls.    In addition to the heart rate issues Ada does report some chest pain across the upper part of her chest.  It is hard to tell whether this may be related to musculoskeletal issues.  Nonetheless a Lexiscan could be helpful in providing some reassurance that it is not cardiac if the test is negative.    A final concern on Ada is part is the potential for aorta disease given her diagnosis of Tom-Danlos.  I did review the past  laboratory findings and noted that she had a normal aorta on echocardiogram and June 2018.  Consequently we will wait another year before repeating the echocardiogram.  Incidentally her left ventricular function was normal.    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     Chronic sinusitis 00/2007    Diagnosed with chronic pansinusitis 2016     Chronic tonsillitis 1980    Had tonsils removed 02/1981, rheumatic fever     Tom-Danlos disease      Eosinophilic esophagitis 08/2018     Gastroesophageal reflux disease 3/1/2017    I have large hiatal hernia     Hernia, abdominal 10/2016    Hiatal Hernia     Hiatal hernia 2016    Have adeline hiatel hernia     Hoarseness Unsure    It comes and goes     IBS (irritable bowel syndrome) with constipation     improved on Linzess     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     Migraines 1/1/2007    Unsure of exact date     Nasal polyps 2013    Were revoved 03/2017, benign     Other nervous system complications 1/2014    Autonomic nervous system disorder, neuralgia, neuropathy     Pneumonia Unsure    Have had pneumonia several times     Swelling, mass, or lump in head and neck 08/2016    Lymph node has been growing for last year     Thyroid disease 01/01/2008       CURRENT MEDICATIONS:  Current Outpatient Medications   Medication Sig Dispense Refill     ACETAMINOPHEN PO Take 1,000 mg by mouth every 6 hours as needed for pain Take with tramadol       albuterol (PROAIR HFA/PROVENTIL HFA/VENTOLIN HFA) 108 (90 BASE) MCG/ACT Inhaler Inhale 2 puffs into the lungs every 6 hours as needed        buprenorphine (BUTRANS) 10 MCG/HR WK patch Place 1 patch onto the skin every 7 days  4 patch 1     butalbital-acetaminophen-caffeine (FIORICET/ESGIC) -40 MG tablet TAKE ONE TABLET BY MOUTH EVERY 4 HOURS AS NEEDED. MAX 10 PER MONTH. 10 tablet 0     cetirizine (ZYRTEC) 10 MG tablet Take 10 mg by mouth daily        COMPRESSION STOCKINGS 1 each daily 20-30 mmHg Thigh Length measure and fit, color and style per patient preference. Doff n jose per need. 3 each 3     DULoxetine (CYMBALTA) 30 MG capsule Take 60mg in the morning and 30mg at bedtime 270 capsule 3     eletriptan (RELPAX) 20 MG tablet Take 1 tablet (20 mg) by mouth at onset of headache for migraine May repeat in 2 hours. Max 4 tablets/24 hours. 18 tablet 1     EPINEPHrine (EPIPEN/ADRENACLICK/OR ANY BX GENERIC EQUIV) 0.3 MG/0.3ML injection 2-pack Inject 0.3 mg into the muscle       famotidine (PEPCID) 20 MG tablet Take 1 tablet (20 mg) by mouth At Bedtime 90 tablet 1     fluticasone (FLONASE) 50 MCG/ACT spray INSTILL 2 SPRAYS INTO BOTH NOSTRILS DAILY 48 mL 3     fluticasone-salmeterol (ADVAIR DISKUS) 250-50 MCG/DOSE diskus inhaler Inhale 1 puff into the lungs 2 times daily as needed        levothyroxine (SYNTHROID/LEVOTHROID) 25 MCG tablet Take 1 tablet (25 mcg) by mouth daily 90 tablet 2     metoclopramide (REGLAN) 10 MG tablet Take 1 tablet (10 mg) by mouth 4 times daily (before meals and nightly) 40 tablet 3     Multiple Vitamins-Minerals (MULTIVITAMIN PO) Take 1 tablet by mouth daily        omeprazole (PRILOSEC) 40 MG capsule Take 1 capsule (40 mg) by mouth 2 times daily as needed (prn) 180 capsule 2     ondansetron (ZOFRAN-ODT) 4 MG ODT tab Take 1-2 tablets (4-8 mg) by mouth every 12 hours as needed for nausea 60 tablet 1     oxyCODONE (ROXICODONE) 5 MG tablet Take 0.5 tablets (2.5 mg) by mouth every 6 hours as needed for severe pain 15 tablet 0     Probiotic Product (PROBIOTIC DAILY PO) Take 2 packets by mouth every morning        propranolol ER (INDERAL LA) 120 MG 24 hr capsule Take 1 capsule (120 mg) by mouth daily 30 capsule 11      rizatriptan (MAXALT-MLT) 5 MG ODT TAKE 1-2 TABLETS (5-10 MG) BY MOUTH AT ONSET OF HEADACHE FOR MIGRAINE (MAX 30 MG IN 24 HOURS) 9 tablet 11     SUMAtriptan (IMITREX STATDOSE) 6 MG/0.5ML pen injector kit Inject 0.5 ml (6 mg) subcutaneously at onset of migraine, May repeat in 1 hour , Max 12 mg/24 hrs 2 kit 1     tiZANidine (ZANAFLEX) 4 MG tablet Take 1 tablet (4 mg) by mouth 4 times daily as needed for muscle spasms 360 tablet 3     traMADol (ULTRAM) 50 MG tablet Take 1 tablet (50 mg) by mouth every 6 hours as needed for severe pain 60 tablet 0     vitamin B complex with vitamin C (VITAMIN  B COMPLEX) TABS tablet Take 1 tablet by mouth daily       VITAMIN D, CHOLECALCIFEROL, PO Take 2,000 Units by mouth daily       linaclotide (LINZESS) 145 MCG capsule Take 1 capsule (145 mcg) by mouth every morning (before breakfast) Last refill until GI clinic follow up. (Patient not taking: Reported on 9/19/2019) 90 capsule 2     medical cannabis (Patient's own supply.  Not a prescription) Take 1 Dose by mouth See Admin Instructions (This is NOT a prescription, and does not certify that the patient has a qualifying medical condition for medical cannabis.  The purpose of this order is  to document that the patient reports taking medical cannabis.)       Nerve Stimulator (CEFALY KIT) KAROLINE 1 Device daily as needed (Patient not taking: Reported on 9/19/2019) 1 Device 0     senna (SENOKOT) 8.6 MG tablet Take 17.2-34.4 mg by mouth       ZONISAMIDE PO Take 75 mg by mouth daily         PAST SURGICAL HISTORY:  Past Surgical History:   Procedure Laterality Date     ADENOIDECTOMY  1981     AS REPAIR OF NASAL SEPTUM       BACK SURGERY  12/09/2013  10/01/2014    Spinal fusion L4-S1, L5 moving 5/8ths in. Remove screws/rods     BIOPSY  01/01/2008 & 3/2017    mole biopsy, lymph node biopsy     COLONOSCOPY  3/2017    Colonoscopy and GI     ESOPHAGEAL IMPEDENCE FUNCTION TEST WITH 24 HOUR PH GREATER THAN 1 HOUR N/A 9/17/2019    Procedure:  IMPEDANCE PH STUDY, ESOPHAGUS, 24 HOUR;  Surgeon: Raghavendra Grande MD;  Location:  GI     ESOPHAGOSCOPY, GASTROSCOPY, DUODENOSCOPY (EGD), COMBINED N/A 8/3/2018    Procedure: COMBINED ESOPHAGOSCOPY, GASTROSCOPY, DUODENOSCOPY (EGD), BIOPSY SINGLE OR MULTIPLE;;  Surgeon: Juan Woodson MD;  Location: [facility]     ESOPHAGOSCOPY, GASTROSCOPY, DUODENOSCOPY (EGD), COMBINED N/A 10/22/2018    Procedure: COMBINED ESOPHAGOSCOPY, GASTROSCOPY, DUODENOSCOPY (EGD), BIOPSY SINGLE OR MULTIPLE;  Surgeon: Sadia Carolina MD;  Location: [facility] GI     ESOPHAGOSCOPY, GASTROSCOPY, DUODENOSCOPY (EGD), COMBINED N/A 12/17/2018    Procedure: COMBINED ESOPHAGOSCOPY, GASTROSCOPY, DUODENOSCOPY (EGD);  Surgeon: Juan Woodson MD;  Location:  GI     NASAL/SINUS POLYPECTOMY  2017    Polyps were benign     TONSILLECTOMY       TONSILLECTOMY  1981     TUBAL LIGATION         ALLERGIES:     Allergies   Allergen Reactions     Bee Venom Anaphylaxis, Difficulty breathing, Palpitations, Shortness Of Breath and Visual Disturbance     Patient does carry Epi-Pen     No Clinical Screening - See Comments Anaphylaxis     Chicken-Derived Products (Egg) Diarrhea and Nausea     Patient will self monitor  Other reaction(s): Nausea  Patient will self monitor  Other reaction(s): GI intolerance     Compazine [Prochlorperazine] Other (See Comments)     Extreme jittery     Food      Milk     Gabapentin      Gluten Meal Other (See Comments)     Pregabalin Other (See Comments)     Seasonal Allergies Unknown     Dust, mold     Topiramate      Wheat Diarrhea     Wheat Bran Nausea     Patient will self monitor  Other reaction(s): Nausea  Patient will self monitor       FAMILY HISTORY:  Family History   Problem Relation Age of Onset     Connective Tissue Disorder Mother         eds     Connective Tissue Disorder Sister         eds     Cancer Father         Spinal tumor grew into spinal cord, went in brain     Migraines Father      Diabetes Maternal Grandmother          "Elderly diabetes     Heart Disease Maternal Grandmother      Hypertension Maternal Grandmother      Diabetes Paternal Grandmother         Elderly diabetes     Diabetes Paternal Grandfather         Elderly diabetes     Asthma Sister         Pediatric Asthma     Migraines Sister      Asthma Son         Pediatric Asthma     Thyroid Disease Sister         ,     Migraines Sister      Migraines Sister      Breast Cancer No family hx of      - Premature coronary artery disease  - Atrial fibrillation  - Sudden cardiac death     SOCIAL HISTORY:  Social History     Tobacco Use     Smoking status: Former Smoker     Packs/day: 0.20     Years: 10.00     Pack years: 2.00     Types: Cigarettes     Start date: 1991     Last attempt to quit: 2013     Years since quittin.8     Smokeless tobacco: Never Used     Tobacco comment: I somked on and off during specified dates.   Substance Use Topics     Alcohol use: Yes     Comment: occasional     Drug use: Yes     Types: Marijuana     Comment: medical marijuana       ROS:   Constitutional: No fever, chills, or sweats. Weight stable.   ENT: No visual disturbance, ear ache, epistaxis, sore throat.   Cardiovascular: As per HPI.   Respiratory: No cough, hemoptysis.    GI: No nausea, vomiting, hematemesis, melena, or hematochezia.   : No hematuria.   Integument: Negative.   Psychiatric: Negative.   Hematologic:  Easy bruising, no easy bleeding.  Neuro: Negative.   Endocrinology: No significant heat or cold intolerance   Musculoskeletal: No myalgia.    Exam:  Ht 1.702 m (5' 7\")   Wt 88.1 kg (194 lb 4.8 oz)   SpO2 99%   BMI 30.43 kg/m    GENERAL APPEARANCE: healthy, alert and no distress  HEENT: no icterus, no xanthelasmas, normal pupil size and reaction, normal palate, mucosa moist, no central cyanosis  NECK: no adenopathy, no asymmetry, masses, or scars, thyroid normal to palpation and no bruits, JVP not elevated  RESPIRATORY: lungs clear to auscultation - no rales, rhonchi or " wheezes, no use of accessory muscles, no retractions, respirations are unlabored, normal respiratory rate  CARDIOVASCULAR: regular rhythm, normal S1 with physiologic split S2, no S3 or S4 and no murmur, click or rub, precordium quiet with normal PMI.  ABDOMEN: soft, non tender, without hepatosplenomegaly, no masses palpable, bowel sounds normal, aorta not enlarged by palpation, no abdominal bruits  EXTREMITIES: peripheral pulses normal, no edema, no bruits  NEURO: alert and oriented to person/place/time, normal speech, gait and affect  VASC: Radial, femoral, dorsalis pedis and posterior tibialis pulses are normal in volumes and symmetric bilaterally. No bruits are heard.  SKIN: no ecchymoses, no rashes    Labs:  CBC RESULTS:   Lab Results   Component Value Date    WBC 8.9 03/03/2019    RBC 4.91 03/03/2019    HGB 13.7 03/03/2019    HCT 41.6 03/03/2019    MCV 85 03/03/2019    MCH 27.9 03/03/2019    MCHC 32.9 03/03/2019    RDW 13.7 03/03/2019     03/03/2019       BMP RESULTS:  Lab Results   Component Value Date     03/03/2019    POTASSIUM 3.4 03/03/2019    CHLORIDE 102 03/03/2019    CO2 25 03/03/2019    ANIONGAP 10 03/03/2019    GLC 95 03/03/2019    BUN 9 03/03/2019    CR 0.82 03/03/2019    GFRESTIMATED 86 03/03/2019    GFRESTBLACK >90 03/03/2019    JUAN 8.6 03/03/2019        INR RESULTS:  Lab Results   Component Value Date    INR 0.97 02/28/2019    INR 0.90 12/17/2018    INR 0.96 07/06/2018    INR 1.01 07/25/2017       Procedures:  PULMONARY FUNCTION TESTS:   No flowsheet data found.      ECHOCARDIOGRAM:   No results found for this or any previous visit (from the past 8760 hour(s)).      Assessment and Plan:     #1. Autonomic dysfunction    2.  Tachycardia and bradycardia that appear to occur at random times.  Cardiac monitoring will be reinitiated using CardioNet for a couple weeks to try and get a better handle on the patient's symptoms and associated rhythm.    3.  Chest pain which is most likely  musculoskeletal.  A Lexiscan will be obtained.    4.  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  An echo 1 year ago did not reveal any problem.  Surveillance every couple years should be appropriate.    5.  Clinic follow-up in 4 to 6 weeks after test results are obtained.     I very much appreciated the opportunity to see and assess Ms Ada Welch in the clinic  Today    I agree with the note above which summarizes my findings and current recommendations    Please do not hesitate to contact my office if you have any questions or concerns.      Kirk Russell MD  Cardiac Arrhythmia Service  [facility]  [phone]     CC  [facility], [facility] MACI[facility]

## 2019-09-19 NOTE — NURSING NOTE
Chief Complaint   Patient presents with     Follow Up     47yoF with autonomic dysfunction presents for annual follow-up.     Vitals were taken, medications reconciled and Orthostatic blood pressure done.     Diaz Yoo CMA    11:17 AM

## 2019-09-25 ENCOUNTER — OFFICE VISIT (OUTPATIENT)
Dept: INTERNAL MEDICINE | Facility: CLINIC | Age: 49
End: 2019-09-25
Payer: COMMERCIAL

## 2019-09-25 VITALS
WEIGHT: 195 LBS | OXYGEN SATURATION: 98 % | SYSTOLIC BLOOD PRESSURE: 121 MMHG | HEART RATE: 81 BPM | DIASTOLIC BLOOD PRESSURE: 81 MMHG | BODY MASS INDEX: 30.54 KG/M2

## 2019-09-25 DIAGNOSIS — Z12.31 ENCOUNTER FOR SCREENING MAMMOGRAM FOR BREAST CANCER: ICD-10-CM

## 2019-09-25 DIAGNOSIS — D47.09 MAST CELL DISORDER: ICD-10-CM

## 2019-09-25 DIAGNOSIS — Q79.60 EHLERS-DANLOS SYNDROME: ICD-10-CM

## 2019-09-25 DIAGNOSIS — K21.00 GASTROESOPHAGEAL REFLUX DISEASE WITH ESOPHAGITIS: ICD-10-CM

## 2019-09-25 DIAGNOSIS — K44.9 HIATAL HERNIA: ICD-10-CM

## 2019-09-25 DIAGNOSIS — G44.219 EPISODIC TENSION-TYPE HEADACHE, NOT INTRACTABLE: ICD-10-CM

## 2019-09-25 DIAGNOSIS — Z23 NEED FOR INFLUENZA VACCINATION: ICD-10-CM

## 2019-09-25 DIAGNOSIS — M54.2 CERVICALGIA: ICD-10-CM

## 2019-09-25 DIAGNOSIS — T78.2XXS ANAPHYLAXIS, SEQUELA: ICD-10-CM

## 2019-09-25 DIAGNOSIS — K20.0 EOSINOPHILIC ESOPHAGITIS: Primary | ICD-10-CM

## 2019-09-25 RX ORDER — EPINEPHRINE 0.3 MG/.3ML
0.3 INJECTION SUBCUTANEOUS PRN
Qty: 2 EACH | Refills: 1 | Status: SHIPPED | OUTPATIENT
Start: 2019-09-25 | End: 2019-10-23

## 2019-09-25 RX ORDER — FAMOTIDINE 20 MG/1
20 TABLET, FILM COATED ORAL AT BEDTIME
Qty: 90 TABLET | Refills: 3 | Status: SHIPPED | OUTPATIENT
Start: 2019-09-25 | End: 2020-09-02

## 2019-09-25 ASSESSMENT — PAIN SCALES - GENERAL: PAINLEVEL: SEVERE PAIN (6)

## 2019-09-25 NOTE — NURSING NOTE
Ada Welch      1.  Has the patient received the information for the influenza vaccine? YES    2.  Does the patient have any of the following contraindications?     Allergy to eggs? No     Allergy to a component of the influenza vaccine? No     Serious reaction to previous influenza vaccines? No     Paralyzed by Guillain-Onward syndrome? No     Currently sick today? No         3.  Verified patient name and  prior to injection. Yes  4.  The patient was instructed to wait 15 minutes before leaving the building in the event of an allergic reaction: YES        Vaccination given by LINDA Castillo at 1:13 PM sign on 2019      Recorded by Oly Higgins CMA

## 2019-09-25 NOTE — PROGRESS NOTES
Detailed Medical History:  -EDS-Type 3 Hypermobility. 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 He reports having a Beighton score of 7-8 out of 9. She reports having genetic testing done, which was questionable for the MYLK Gene. She follows with a rheumatologist who is an EDS specialist in [place].  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 Russell, has ILR.  -Cervical instability, chiari malformation:  Saw Dr. Moncada, cervical fusion not recommended, considering occipital blocks. Also traveled to [facility] discontinue to see Dr. Andrey Sofia.  -Chronic headaches: Daily tension, cervicogenic, migraine. Has not responded well to injections. Hospitalized in 2017 with normal LP and MRI. At that time responded to DHA.  Had blood patch done 3/19 with good relief of pain. Previously failed amovig and emgality. Blood patch test repeated 9/19.  -Hiatal hernia: Recently saw Dr. Mack and had manometry testing done in 2019. Considering laparoscopic repair.  -Eosinophilic esophagitis  -Lumbar pain: She underwent L4 to S1 fusion in 2013 followed by removal of instrumentation, which did not improve her symptoms. She met with Dr. Martinez in the pain clinic and was taken off high doses of Dilaudid and started on Suboxone in 2017, which dramatically improved her symptoms.  She continues to work with physical therapy twice a week as well as other complementary therapies.   -Chronic Pain: On suboxone per above, as well as occasional oxycodone and ultram. Uses medical cannabis since 2018 which helps.  -Asthma  -Enlarged cervical lymph nodes: Biopsied in pas and benign.  -Urinary urge incontinence/incomplete emptying  -Pelvic floor dysfunction           Routine Health Maintenence:  Depression Screening:   PHQ-2 ( 1999 Pfizer) 2/14/2019 11/8/2018   Q1: Little interest or pleasure in doing things 0 0   Q2: Feeling down, depressed or hopeless 0 0   PHQ-2 Score 0 0   Q1: Little interest or pleasure in doing things Not  at all Not at all   Q2: Feeling down, depressed or hopeless Not at all Not at all   PHQ-2 Score 0 0      Immunizations (zoster, pneumovax, flu, Tdap, Hep A/B):   There is no immunization history on file for this patient.  Lipids:       Recent Labs   Lab Test 04/26/18  1010   CHOL 244*   HDL 48*   *   TRIG 156*      Lung Ca Screening (>30 pk age 55-79 or >20 py age 50-79 + RF): n/a low pack years  Colonoscopy (50-75 yrs): 3/17   - One small hyperplastic polyp in the sigmoid colon, removed with a cold snare. Resected and retrieved. Diverticulosis in the sigmoid colon. Repeat colonoscopy in10 years for surveillance   Dexa (>65W or 70M yrs): n/a  Mammogram (40-75 yrs): 1/18 ordered, due  Pap (21-65 yrs): 1/18 HPV neg  HIV/HCV if risk factors:   Tob/EtOH: screen neg  Lifestyle factors: reviewed  Advanced Directive: deferred

## 2019-09-25 NOTE — PROGRESS NOTES
[facility]  Primary Care Center   Ellyn Rivera MD  09/25/2019      Chief Complaint:   Pain and Numbness       History of Present Illness:   Ada Welch is a 48 year old female with a complex medical history including Tom-Danlos syndrome, chiari malformation, chronic pain, and cervicalgia who presents for follow up of pain and esophageal symptoms.    Cardiology: Toward the end of the summer, she began to experience dramatic fluctuations in heart rate. At one point she experienced a heart rate of 36 during the day and 166 at night, as well as a heart rate of 210 one day while she was sweeping (all heart rates were recorded on her apple watch). She saw Dr. Russell in cardiology on 9/19/19, who is considering the possibility of SVT. She will be doing cardiac monitoring with [employer] and completing a Lexiscan to further investigate symptoms.     Pain: She gets headaches from time to time. She feels that something has changed in the structure of her neck and lower back that has increased her pain. When she stands or sits, she experiences shooting pain down her leg. She also experiences pain that radiates from her neck down through her left arm. She used to be able to just use Tramadol 50 mg PRN for pain flares, but now she is having to use Tramadol 50 mg daily and Oxycodone 5 mg in addition. She has been using medical cannabis, but finds that when the dose is high enough to treat her pain, she falls asleep. She sees Dr. Martinez in the pain clinic, and at their most recent visit on 02/14/19 no changes were made in her medications or treatment plan. She has been using her cane and neck brace more frequently lately. She used to see a myofascial-specific physical therapy group, but her insurance changed and she is no longer able to go.  She thinks that her degeneration due to EDS has been progressing much quicker than her care team was expecting.     Neurology: She saw Dr. Galeano on 8/15/19 for her neurologic  symptoms that include headaches, myoclonic jerks, and spine pain. He recommended a 3-hour EEG. The EEG was performed on 9/03/19 and was within normal limits. She has not seen Dr. Enciso about her chiari malformation since 2016. She has seen a neurosurgeon, but they are hesitant to complete any treatment because her EDS might impede healing.    EoE: Her swallowing is good as long as she is taking the Omeprazole 40 mg. Last winter, a piece of meat got stuck in her throat and she vomited for 15 hours as a result. An endoscopy on 12/17/18 revealed eosinophilic esophagitis. Her last visit with Dr. Ruvalcaba in gastroenterology was 08/08/18, and she reports that she has been unable to go back because she has to see a pelvic  first. She is not undergoing any treatment for EoE other than Omeprazole.     Constipation: She is no longer taking Linzess and is taking Reglan 10 mg instead. She finds it effective.     Other concerns discussed:  1. She had a normal pap January 2018. She had an abnormal pap when she was pregnant.   2. She is amenable to the flu shot. She reports that she sometimes gets more sick if she is vaccinated.   3. She had manometry testing.      Detailed Medical History:  -EDS-Type 3 Hypermobility. 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 He reports having a Beighton score of 7-8 out of 9. She reports having genetic testing done, which was questionable for the MYLK Gene. She previously followed with a rheumatologist who is an EDS specialist in [place].  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 Russell, has ILR.  -Cervical instability, chiari malformation:  Saw Dr. Moncada, cervical fusion not recommended, considering occipital blocks. Also traveled to [facility] to see Dr. Andrey Sofia.  -Chronic headaches: Daily tension, cervicogenic, migraine. Has not responded well to injections. Hospitalized in 2017 with normal LP and MRI. At that time responded to DHA.  Had blood  patch done 3/19 with good relief of pain. Previously failed amovig and emgality. Blood patch test repeated 9/19.  -Hiatal hernia: Recently saw Dr. Mack and had manometry testing done in Feb 2019 which revealed lower esoph pressure, hiatal hernia, possibly short esophagus, peristalsis was preserved. Considering laparoscopic repair.  -Eosinophilic esophagitis: EGD 12/18 performed for food impaction showed ring at GEJ and mucosal changes c/w EoE. Biopsies 8/18 positive for Eos.  -Lumbar pain: She underwent L4 to S1 fusion in 2013 followed by removal of instrumentation, which did not improve her symptoms. She met with Dr. Martinez in the pain clinic and was taken off high doses of Dilaudid and started on Suboxone in 2017, which dramatically improved her symptoms.  She continues to work with physical therapy twice a week as well as other complementary therapies.   -Chronic Pain: On suboxone per above, as well as occasional oxycodone and ultram. Uses medical cannabis since 2018 which helps.  -IBS, constipation: GES showed rapid emptying 10/17. XR video swallow showed no aspiration 10/17.  -Asthma  -Enlarged cervical lymph nodes: L level 2 node biopsied 10/18 with scant cellularity. Biopsied in past and benign with neg flow cytometry.  -Urinary urge incontinence/incomplete emptying  -Pelvic floor dysfunction           Routine Health Maintenence:  Depression Screening:   PHQ-2 ( 1999 Pfizer) 2/14/2019 11/8/2018   Q1: Little interest or pleasure in doing things 0 0   Q2: Feeling down, depressed or hopeless 0 0   PHQ-2 Score 0 0   Q1: Little interest or pleasure in doing things Not at all Not at all   Q2: Feeling down, depressed or hopeless Not at all Not at all   PHQ-2 Score 0 0      Immunizations (zoster, pneumovax, flu, Tdap, Hep A/B):   There is no immunization history on file for this patient.  Lipids:       Recent Labs   Lab Test 04/26/18  1010   CHOL 244*   HDL 48*   *   TRIG 156*      Lung Ca Screening (>30 pk age  55-79 or >20 py age 50-79 + RF): n/a low pack years  Colonoscopy (50-75 yrs): 3/17   - One small hyperplastic polyp in the sigmoid colon, removed with a cold snare. Resected and retrieved. Diverticulosis in the sigmoid colon. Repeat colonoscopy in10 years for surveillance   Dexa (>65W or 70M yrs): n/a  Mammogram (40-75 yrs): 1/18 ordered, due  Pap (21-65 yrs): 1/18 HPV neg  HIV/HCV if risk factors:  HIV neg 4/18  Tob/EtOH: screen neg  Lifestyle factors: reviewed  Advanced Directive: deferred    Review of Systems:   Pertinent items are noted in HPI or as in patient entered ROS below, remainder of complete ROS is negative.     Active Medications:     Current Outpatient Medications:      ACETAMINOPHEN PO, Take 1,000 mg by mouth every 6 hours as needed for pain Take with tramadol, Disp: , Rfl:      albuterol (PROAIR HFA/PROVENTIL HFA/VENTOLIN HFA) 108 (90 BASE) MCG/ACT Inhaler, Inhale 2 puffs into the lungs every 6 hours as needed , Disp: , Rfl:      buprenorphine (BUTRANS) 10 MCG/HR WK patch, Place 1 patch onto the skin every 7 days, Disp: 4 patch, Rfl: 1     butalbital-acetaminophen-caffeine (FIORICET/ESGIC) -40 MG tablet, TAKE ONE TABLET BY MOUTH EVERY 4 HOURS AS NEEDED. MAX 10 PER MONTH., Disp: 10 tablet, Rfl: 0     cetirizine (ZYRTEC) 10 MG tablet, Take 10 mg by mouth daily , Disp: , Rfl:      COMPRESSION STOCKINGS, 1 each daily 20-30 mmHg Thigh Length measure and fit, color and style per patient preference. Doff juanjose garcia per need., Disp: 3 each, Rfl: 3     DULoxetine (CYMBALTA) 30 MG capsule, Take 60mg in the morning and 30mg at bedtime, Disp: 270 capsule, Rfl: 3     eletriptan (RELPAX) 20 MG tablet, Take 1 tablet (20 mg) by mouth at onset of headache for migraine May repeat in 2 hours. Max 4 tablets/24 hours., Disp: 18 tablet, Rfl: 1     EPINEPHrine (EPIPEN/ADRENACLICK/OR ANY BX GENERIC EQUIV) 0.3 MG/0.3ML injection 2-pack, Inject 0.3 mg into the muscle, Disp: , Rfl:      famotidine (PEPCID) 20 MG tablet,  Take 1 tablet (20 mg) by mouth At Bedtime, Disp: 90 tablet, Rfl: 1     fluticasone (FLONASE) 50 MCG/ACT spray, INSTILL 2 SPRAYS INTO BOTH NOSTRILS DAILY, Disp: 48 mL, Rfl: 3     fluticasone-salmeterol (ADVAIR DISKUS) 250-50 MCG/DOSE diskus inhaler, Inhale 1 puff into the lungs 2 times daily as needed , Disp: , Rfl:      levothyroxine (SYNTHROID/LEVOTHROID) 25 MCG tablet, Take 1 tablet (25 mcg) by mouth daily, Disp: 90 tablet, Rfl: 2     linaclotide (LINZESS) 145 MCG capsule, Take 1 capsule (145 mcg) by mouth every morning (before breakfast) Last refill until GI clinic follow up. (Patient not taking: Reported on 9/19/2019), Disp: 90 capsule, Rfl: 2     medical cannabis (Patient's own supply.  Not a prescription), Take 1 Dose by mouth See Admin Instructions (This is NOT a prescription, and does not certify that the patient has a qualifying medical condition for medical cannabis.  The purpose of this order is  to document that the patient reports taking medical cannabis.), Disp: , Rfl:      metoclopramide (REGLAN) 10 MG tablet, Take 1 tablet (10 mg) by mouth 4 times daily (before meals and nightly), Disp: 40 tablet, Rfl: 3     Multiple Vitamins-Minerals (MULTIVITAMIN PO), Take 1 tablet by mouth daily , Disp: , Rfl:      Nerve Stimulator (CEFALY KIT) KAROLINE, 1 Device daily as needed (Patient not taking: Reported on 9/19/2019), Disp: 1 Device, Rfl: 0     omeprazole (PRILOSEC) 40 MG capsule, Take 1 capsule (40 mg) by mouth 2 times daily as needed (prn), Disp: 180 capsule, Rfl: 2     ondansetron (ZOFRAN-ODT) 4 MG ODT tab, Take 1-2 tablets (4-8 mg) by mouth every 12 hours as needed for nausea, Disp: 60 tablet, Rfl: 1     oxyCODONE (ROXICODONE) 5 MG tablet, Take 0.5 tablets (2.5 mg) by mouth every 6 hours as needed for severe pain, Disp: 15 tablet, Rfl: 0     Probiotic Product (PROBIOTIC DAILY PO), Take 2 packets by mouth every morning , Disp: , Rfl:      propranolol ER (INDERAL LA) 120 MG 24 hr capsule, Take 1 capsule (120  mg) by mouth daily, Disp: 30 capsule, Rfl: 11     rizatriptan (MAXALT-MLT) 5 MG ODT, TAKE 1-2 TABLETS (5-10 MG) BY MOUTH AT ONSET OF HEADACHE FOR MIGRAINE (MAX 30 MG IN 24 HOURS), Disp: 9 tablet, Rfl: 11     senna (SENOKOT) 8.6 MG tablet, Take 17.2-34.4 mg by mouth, Disp: , Rfl:      SUMAtriptan (IMITREX STATDOSE) 6 MG/0.5ML pen injector kit, Inject 0.5 ml (6 mg) subcutaneously at onset of migraine, May repeat in 1 hour , Max 12 mg/24 hrs, Disp: 2 kit, Rfl: 1     tiZANidine (ZANAFLEX) 4 MG tablet, Take 1 tablet (4 mg) by mouth 4 times daily as needed for muscle spasms, Disp: 360 tablet, Rfl: 3     traMADol (ULTRAM) 50 MG tablet, Take 1 tablet (50 mg) by mouth every 6 hours as needed for severe pain, Disp: 60 tablet, Rfl: 0     vitamin B complex with vitamin C (VITAMIN  B COMPLEX) TABS tablet, Take 1 tablet by mouth daily, Disp: , Rfl:      VITAMIN D, CHOLECALCIFEROL, PO, Take 2,000 Units by mouth daily, Disp: , Rfl:      ZONISAMIDE PO, Take 75 mg by mouth daily, Disp: , Rfl:       Allergies:   Bee venom; No clinical screening - see comments; Chicken-derived products (egg); Compazine [prochlorperazine]; Food; Gabapentin; Gluten meal; Pregabalin; Seasonal allergies; Topiramate; Wheat; and Wheat bran      Past Medical History:  Allergic rhinitis  Arthritis  Immunosuppressive   Blood clotting disorder  Chronic sinusitis  Chronic tonsillitis  Tom-Danlos disease  GERD  Hiatal hernia   Irritable bowel syndrome with constipation  Migraines  Nasal polyps   Pneumonia   Thyroid disease   Chronic pain syndrome   Postlaminectomy syndrome, lumbar region  Cervicalgia  Nocturia  Urinary urgency  Acquired hypothyroidism  Cardiac device in situ: [employer] implanted loop recorder    Anxiety   Astigmatism  Arthritis of facet joints at multiple vertebral levels   Cyst of fallopian tube  Opioid dependence  Depression  Deviated nasal septum   Dysuria  Essential hypertension  Head and neck lymphadenopathy    Hepatomegaly  Keratoconjunctivitis sicca  Low back pain  Lumbar degenerative disc disease   Liver lesion   Lumbar radiculopathy  Nasal turbinate hypertrophy   Presbyopia   Spondylolisthesis of lumbar region  Sinus tachycardia   Uterine endometriosis    Past Surgical History:  Adenoidectomy: 1981  Repair of nasal septum  Back surgery: 12/09/13, 10/01/14  Mole biopsy: 01/01/08  Lymph node biopsy: 3/2017  Colonoscopy: 3/2017  Esophageal impedence function test: 9/17/19  EGD, combined: 8/3/18, 10/22/18, 12/17/18   Nasal/sinus polypectomy: 2017  Tonsillectomy     Family History:   Tom-Danlos syndrome: mother, sister  Cancer: father   Migraines: father, sister  Diabetes: maternal grandmother, paternal grandfather   Heart disease: maternal grandmother  Hypertension: maternal grandmother  Asthma: sister    Social History:   Smoking status: former smoker, quit 2013  Alcohol use: occasional     Physical Exam:   /81 (BP Location: Right arm, Patient Position: Sitting, Cuff Size: Adult Regular)   Pulse 81   Wt 88.5 kg (195 lb)   SpO2 98%   Breastfeeding? No   BMI 30.54 kg/m     Constitutional: Alert, oriented, pleasant, no acute distress  Head: Normocephalic, atraumatic  Eyes: Extra-ocular movements intact, no scleral icterus  ENT: Oropharynx clear, moist mucus membranes, good dentition  Neck: Supple, no lymphadenopathy, no thyromegaly   Cardiovascular: Regular rate and rhythm, no murmurs, rubs or gallops, peripheral pulses full/symmetric  Respiratory: Good air movement bilaterally, lungs clear, no wheezes/rales/rhonchi  Musculoskeletal: No edema, normal muscle tone, ambulates with a cane  Neurologic: Alert and oriented, cranial nerves 2-12 intact.  Psychiatric: normal mentation, affect and mood        Assessment and Plan:  Eosinophilic esophagitis  She had a food impaction last year and an endoscopy revealed eosinophilic esophagitis. She has not been on therapy other than a PPI. She is also undergoing workup for  esophageal dysmotility and a hiatal hernia. She recently had manometry testing. I advised follow up with GI.   - GASTROENTEROLOGY ADULT REFERRAL    Need for influenza vaccination  - FLU VAC PRESRV FREE QUAD SPLIT VIR 3+YRS IM    Encounter for screening mammogram for breast cancer  - Mammogram, routine screening    Cervicalgia  She has history of chiari malformation, chronic neck pain, and headaches. She is wondering if she needs imaging to evaluate this further. I am not sure which clinic would be best to help advise her on this, though I suggested she follow up with PM&R and neurology. She saw neurosurgery in the past and no surgery was recommended.  - PHYSIATRY (PM&R) REFERRAL    Episodic tension-type headache, not intractable  - PHYSIATRY (PM&R) REFERRAL    Tom-Danlos syndrome  - PHYSIATRY (PM&R) REFERRAL  - PHYSICAL THERAPY REFERRAL      Follow-up: No follow-ups on file.      Scribe Disclosure:  Yanely ROBBINS, am serving as a scribe to document services personally performed by Ellyn Rivera MD at this visit, based upon the provider's statements to me. All documentation has been reviewed by the aforementioned provider prior to being entered into the official medical record.    Scribe Preparation Attestation:  Yanely ROBBINS, a scribe, prepared the chart for today's encounter.      Portions of this medical record were completed by a scribe. UPON MY REVIEW AND AUTHENTICATION BY ELECTRONIC SIGNATURE, this confirms (a) I performed the applicable clinical services, and (b) the record is accurate.   Ellyn Rivera MD  Internal Medicine    25 min spent face to face, of which >50% time spent on counseling/coordinating care exclusive of any procedure time

## 2019-09-25 NOTE — NURSING NOTE
Chief Complaint   Patient presents with     Pain     Pt reports neck and lower back pain. Reports worse pain. Toes numb on left foot upon waking up     Numbness     Pt reports numbness in left hand      LINDA Castillo at 12:11 PM sign on 9/25/2019

## 2019-09-26 ENCOUNTER — OFFICE VISIT (OUTPATIENT)
Dept: NEUROLOGY | Facility: CLINIC | Age: 49
End: 2019-09-26
Attending: PSYCHIATRY & NEUROLOGY
Payer: COMMERCIAL

## 2019-09-26 DIAGNOSIS — G51.8 FACIAL NEURITIS: ICD-10-CM

## 2019-09-26 DIAGNOSIS — M96.1 POSTLAMINECTOMY SYNDROME: ICD-10-CM

## 2019-09-26 RX ORDER — OXYCODONE HYDROCHLORIDE 5 MG/1
2.5 TABLET ORAL EVERY 6 HOURS PRN
Qty: 15 TABLET | Refills: 0 | Status: SHIPPED | OUTPATIENT
Start: 2019-09-26 | End: 2019-12-05

## 2019-09-26 NOTE — PROGRESS NOTES
[facility]  Electrodiagnostic Laboratory    Nerve Conduction & EMG Report          Patient: Ada Welch YOB: 1970  Patient ID: [id number] Age: 48 Years 9 Months  Gender: Female      History & Examination: This is a 48-year-old female referred by Dr. Seth Galeano for the evaluation of left hemifacial spasm.      Techniques:  Sensory and motor conduction studies were done with surface recording electrodes. EMG was done with a concentric needle electrode.     Results: Standard blink and facial responses were normal bilaterally.  We tested for hemifacial spasm using the lateral spread technique.  Recording electrodes were placed in bipolar fashion over the left orbicularis oculi and left mentalis.  The left zygomaticus branch of the facial nerve and left mandibular branch of the facial nerve were stimulated.  With stimulation normal direct responses were seen in the orbicularis oculi and mentalis.  No short duration lateral spread responses were seen with stimulation of either nerve.    Interpretation: The EMG is normal.  There is no EMG evidence of the lateral spread response as seen in hemifacial spasm.    EMG Physician: Rajan Snowden MD                                Blink Response NEW      Nerve / Sites Muscle R1 R2     ms ms   L SUPRAORBITAL - Stim Left Eye      Left - Ipsi L.Orb Oculi 10.16 29.95      Left - Contra R.Orb Oculi 0.00 35.89   R SUPRAORBITAL - Stim Right Eye      Right - Ipsi R.Orb Oculi 10.78 29.95      Right - Ipsi L.Orb Oculi 9.84 30.89       Motor NCS      Nerve / Sites Rec. Site Lat Amp Rel Amp Dur. Area Temp.     ms mV % ms %  C   L FACIAL - Nasalis      Mastoid Nasalis 2.86 0.7 100 7.81 100 24.7   R FACIAL - Nasalis      Mastoid Nasalis 3.39 0.7 100 7.76 100 24.6              Lateral Spread Study      Nerve / Sites Muscle R1 R2     ms ms   L USER TYPE 1 - Zygomatic      Left - Ipsi L.Orb Oculi 1.51 4.48      Left - Contra L.Mentalis 4.95 14.95          Lateral Spread Study      Nerve / Sites Muscle R1 R2     ms ms   L USER TYPE 1 - Mandibular      Left - Ipsi L.Mentalis 1.51 4.48      Left - Contra L. Orb Oculi 4.95 14.95

## 2019-09-26 NOTE — LETTER
9/26/2019       RE: Ada Welch  [address]  [place] [address]     Dear Colleague,    Thank you for referring your patient, Ada Welch, to the [facility] EMG at [facility]. Please see a copy of my visit note below.        [facility]  Electrodiagnostic Laboratory    Nerve Conduction & EMG Report          Patient: Ada Welch YOB: 1970  Patient ID: [id number] Age: 48 Years 9 Months  Gender: Female      History & Examination: This is a 48-year-old female referred by Dr. Seth Galeano for the evaluation of left hemifacial spasm.      Techniques:  Sensory and motor conduction studies were done with surface recording electrodes. EMG was done with a concentric needle electrode.     Results: Standard blink and facial responses were normal bilaterally.  We tested for hemifacial spasm using the lateral spread technique.  Recording electrodes were placed in bipolar fashion over the left orbicularis oculi and left mentalis.  The left zygomaticus branch of the facial nerve and left mandibular branch of the facial nerve were stimulated.  With stimulation normal direct responses were seen in the orbicularis oculi and mentalis.  No short duration lateral spread responses were seen with stimulation of either nerve.    Interpretation: The EMG is normal.  There is no EMG evidence of the lateral spread response as seen in hemifacial spasm.    EMG Physician: Rajan Snowden MD                                Blink Response NEW      Nerve / Sites Muscle R1 R2     ms ms   L SUPRAORBITAL - Stim Left Eye      Left - Ipsi L.Orb Oculi 10.16 29.95      Left - Contra R.Orb Oculi 0.00 35.89   R SUPRAORBITAL - Stim Right Eye      Right - Ipsi R.Orb Oculi 10.78 29.95      Right - Ipsi L.Orb Oculi 9.84 30.89       Motor NCS      Nerve / Sites Rec. Site Lat Amp Rel Amp Dur. Area Temp.     ms mV % ms %  C   L FACIAL - Nasalis      Mastoid Nasalis 2.86 0.7 100 7.81  100 24.7   R FACIAL - Nasalis      Mastoid Nasalis 3.39 0.7 100 7.76 100 24.6              Lateral Spread Study      Nerve / Sites Muscle R1 R2     ms ms   L USER TYPE 1 - Zygomatic      Left - Ipsi L.Orb Oculi 1.51 4.48      Left - Contra L.Mentalis 4.95 14.95         Lateral Spread Study      Nerve / Sites Muscle R1 R2     ms ms   L USER TYPE 1 - Mandibular      Left - Ipsi L.Mentalis 1.51 4.48      Left - Contra L. Orb Oculi 4.95 14.95               Again, thank you for allowing me to participate in the care of your patient.      Sincerely,    Rajan Snowden MD

## 2019-09-27 ENCOUNTER — HOSPITAL ENCOUNTER (OUTPATIENT)
Dept: NUCLEAR MEDICINE | Facility: CLINIC | Age: 49
Setting detail: NUCLEAR MEDICINE
End: 2019-09-27
Attending: INTERNAL MEDICINE
Payer: COMMERCIAL

## 2019-09-27 ENCOUNTER — HOSPITAL ENCOUNTER (OUTPATIENT)
Dept: CARDIOLOGY | Facility: CLINIC | Age: 49
Discharge: HOME | End: 2019-09-27
Attending: INTERNAL MEDICINE | Admitting: INTERNAL MEDICINE
Payer: COMMERCIAL

## 2019-09-27 VITALS — SYSTOLIC BLOOD PRESSURE: 129 MMHG | DIASTOLIC BLOOD PRESSURE: 91 MMHG | HEART RATE: 98 BPM

## 2019-09-27 DIAGNOSIS — R07.2 PRECORDIAL PAIN: ICD-10-CM

## 2019-09-27 PROCEDURE — 34300033 ZZH RX 343: Performed by: INTERNAL MEDICINE

## 2019-09-27 PROCEDURE — A9502 TC99M TETROFOSMIN: HCPCS | Performed by: INTERNAL MEDICINE

## 2019-09-27 PROCEDURE — 25000128 H RX IP 250 OP 636: Performed by: INTERNAL MEDICINE

## 2019-09-27 PROCEDURE — 93017 CV STRESS TEST TRACING ONLY: CPT

## 2019-09-27 PROCEDURE — 93016 CV STRESS TEST SUPVJ ONLY: CPT | Performed by: INTERNAL MEDICINE

## 2019-09-27 PROCEDURE — 78452 HT MUSCLE IMAGE SPECT MULT: CPT

## 2019-09-27 PROCEDURE — 93010 ELECTROCARDIOGRAM REPORT: CPT | Performed by: INTERNAL MEDICINE

## 2019-09-27 RX ORDER — REGADENOSON 0.08 MG/ML
0.4 INJECTION, SOLUTION INTRAVENOUS ONCE
Status: COMPLETED | OUTPATIENT
Start: 2019-09-27 | End: 2019-09-27

## 2019-09-27 RX ORDER — ALBUTEROL SULFATE 90 UG/1
2 AEROSOL, METERED RESPIRATORY (INHALATION) EVERY 5 MIN PRN
Status: DISCONTINUED | OUTPATIENT
Start: 2019-09-27 | End: 2019-09-28 | Stop reason: HOSPADM

## 2019-09-27 RX ORDER — ACYCLOVIR 200 MG/1
0-1 CAPSULE ORAL
Status: DISCONTINUED | OUTPATIENT
Start: 2019-09-27 | End: 2019-09-28 | Stop reason: HOSPADM

## 2019-09-27 RX ORDER — AMINOPHYLLINE 25 MG/ML
50-100 INJECTION, SOLUTION INTRAVENOUS
Status: DISCONTINUED | OUTPATIENT
Start: 2019-09-27 | End: 2019-09-28 | Stop reason: HOSPADM

## 2019-09-27 RX ADMIN — TETROFOSMIN 10.6 MCI.: 1.38 INJECTION, POWDER, LYOPHILIZED, FOR SOLUTION INTRAVENOUS at 11:25

## 2019-09-27 RX ADMIN — REGADENOSON 0.4 MG: 0.08 INJECTION, SOLUTION INTRAVENOUS at 12:18

## 2019-09-27 RX ADMIN — TETROFOSMIN 40 MCI.: 1.38 INJECTION, POWDER, LYOPHILIZED, FOR SOLUTION INTRAVENOUS at 12:22

## 2019-09-27 NOTE — TELEPHONE ENCOUNTER
RECORDS RECEIVED FROM: Internal    DATE RECEIVED: 12/16/19    NOTES STATUS DETAILS   OFFICE NOTE from referring provider Internal OV note 9/25/19    OFFICE NOTE from other specialist N/A    DISCHARGE SUMMARY from hospital N/A    OPERATIVE REPORT N/A    MEDICATION LIST N/A         ENDOSCOPY  Internal 2/6/19,2/17/18, 8/3/18   COLONOSCOPY N/A    ERCP Internal    EUS N/A    STOOL TESTING N/A    PERTINENT LABS Internal    PATHOLOGY REPORTS (RELATED) Internal 10/22/18, 8/3/18   IMAGING (CT, MRI, EGD) N/A      REFERRAL INFORMATION    Date referral was placed: 12/16/19   Date all records received: N/A   Date records were scanned into Epic: N/A   Date records were sent to Provider to review: N/A   Date and recommendation received from provider:  LETTER SENT  SCHEDULE APPOINTMENT   Date patient was contacted to schedule: 9/25/19

## 2019-09-27 NOTE — TELEPHONE ENCOUNTER
Verbal orders given by Dr. Martinez to refill pt's oxycodone for 15 tablets.  Pt updated that prescription will be mailed to her.  She is agreeable to this.     Pt also assisted with rescheduling follow up to a later date due to a conflict on 10/10/19.    Jessica De Souza, RN, BSN

## 2019-10-02 ENCOUNTER — ANCILLARY PROCEDURE (OUTPATIENT)
Dept: CARDIOLOGY | Facility: CLINIC | Age: 49
End: 2019-10-02
Attending: INTERNAL MEDICINE
Payer: COMMERCIAL

## 2019-10-02 DIAGNOSIS — R00.0 SINUS TACHYCARDIA: Primary | ICD-10-CM

## 2019-10-02 DIAGNOSIS — R00.0 SINUS TACHYCARDIA: ICD-10-CM

## 2019-10-02 PROCEDURE — 93270 REMOTE 30 DAY ECG REV/REPORT: CPT | Mod: ZF

## 2019-10-02 NOTE — PROGRESS NOTES
Per Dr. Russell, patient to have 14 day cardio net monitor placed.  Diagnosis: POTS  Monitor placed: Yes  Patient Instructed: Yes  Patient verbalized understanding: Yes  Kit ID: [id number]  Monitor s/n: [id number]

## 2019-10-09 ENCOUNTER — MYC MEDICAL ADVICE (OUTPATIENT)
Dept: CARDIOLOGY | Facility: CLINIC | Age: 49
End: 2019-10-09

## 2019-10-14 ENCOUNTER — DOCUMENTATION ONLY (OUTPATIENT)
Dept: CARE COORDINATION | Facility: CLINIC | Age: 49
End: 2019-10-14

## 2019-10-14 DIAGNOSIS — M96.1 POSTLAMINECTOMY SYNDROME: ICD-10-CM

## 2019-10-14 RX ORDER — TRAMADOL HYDROCHLORIDE 50 MG/1
50 TABLET ORAL EVERY 6 HOURS PRN
Qty: 60 TABLET | Refills: 0 | Status: SHIPPED | OUTPATIENT
Start: 2019-10-14 | End: 2019-11-04

## 2019-10-14 NOTE — TELEPHONE ENCOUNTER
Verbal orders given by Dr. Martinez to refill pt's tramadol for 60 tablets.   checked and is appropriate without concerns.  Pt updated.      Jessica De Souza, RN, BSN

## 2019-10-16 ENCOUNTER — TELEPHONE (OUTPATIENT)
Dept: CARDIOLOGY | Facility: CLINIC | Age: 49
End: 2019-10-16

## 2019-10-16 DIAGNOSIS — M96.1 POSTLAMINECTOMY SYNDROME: ICD-10-CM

## 2019-10-16 DIAGNOSIS — M96.1 POSTLAMINECTOMY SYNDROME OF LUMBAR REGION: ICD-10-CM

## 2019-10-16 RX ORDER — BUPRENORPHINE 10 UG/H
1 PATCH TRANSDERMAL
Qty: 4 PATCH | Refills: 1 | Status: SHIPPED | OUTPATIENT
Start: 2019-10-16 | End: 2019-11-22

## 2019-10-16 NOTE — TELEPHONE ENCOUNTER
[facility] Call Center    Phone Message    May a detailed message be left on voicemail: yes    Reason for Call: Other: Ellyn calling to request a call back in regards to some questions she has on MCOT. Please call her back to discuss.  Ellyn called and ask info to be fax again to fax# [phone]    Action Taken: Message routed to:  [facility] ([facility] giuliano cardio

## 2019-10-16 NOTE — TELEPHONE ENCOUNTER
Refill request    Medication:     buprenorphine (BUTRANS) 10 MCG/HR WK patch  Place 1 patch onto the skin every 7 days      MNPMP Checked: Yes    buprenorphine (BUTRANS) 10 MCG/HR WK patch - Last refilled 9/8/19 for 4 patches       Refilled: Yes  Dated to be refilled on: 10/16/19    Last clinic appointment: 2/14/19  Next clinic appointment: 10/31/19    Patient requested to:  Sent to pharmacy

## 2019-10-19 ENCOUNTER — MYC MEDICAL ADVICE (OUTPATIENT)
Dept: CARDIOLOGY | Facility: CLINIC | Age: 49
End: 2019-10-19

## 2019-10-23 ENCOUNTER — OFFICE VISIT (OUTPATIENT)
Dept: NEUROLOGY | Facility: CLINIC | Age: 49
End: 2019-10-23
Payer: COMMERCIAL

## 2019-10-23 VITALS
WEIGHT: 188.8 LBS | HEIGHT: 67 IN | HEART RATE: 76 BPM | RESPIRATION RATE: 16 BRPM | OXYGEN SATURATION: 96 % | SYSTOLIC BLOOD PRESSURE: 128 MMHG | BODY MASS INDEX: 29.63 KG/M2 | DIASTOLIC BLOOD PRESSURE: 89 MMHG

## 2019-10-23 DIAGNOSIS — G43.719 INTRACTABLE CHRONIC MIGRAINE WITHOUT AURA AND WITHOUT STATUS MIGRAINOSUS: ICD-10-CM

## 2019-10-23 DIAGNOSIS — M48.02 CERVICAL STENOSIS OF SPINAL CANAL: ICD-10-CM

## 2019-10-23 DIAGNOSIS — G96.00 CEREBROSPINAL FLUID LEAK: Primary | ICD-10-CM

## 2019-10-23 PROBLEM — G90.A POTS (POSTURAL ORTHOSTATIC TACHYCARDIA SYNDROME): Status: ACTIVE | Noted: 2019-10-23

## 2019-10-23 RX ORDER — METOCLOPRAMIDE 10 MG/1
10 TABLET ORAL
Qty: 40 TABLET | Refills: 3 | Status: SHIPPED | OUTPATIENT
Start: 2019-10-23 | End: 2019-12-30

## 2019-10-23 RX ORDER — CEFUROXIME AXETIL 250 MG/1
TABLET ORAL
Qty: 2 KIT | Refills: 11 | Status: SHIPPED | OUTPATIENT
Start: 2019-10-23 | End: 2020-09-02

## 2019-10-23 RX ORDER — BUTALBITAL, ACETAMINOPHEN AND CAFFEINE 50; 325; 40 MG/1; MG/1; MG/1
TABLET ORAL
Qty: 20 TABLET | Refills: 0 | Status: SHIPPED | OUTPATIENT
Start: 2019-10-23 | End: 2019-12-30

## 2019-10-23 RX ORDER — RIZATRIPTAN BENZOATE 5 MG/1
TABLET, ORALLY DISINTEGRATING ORAL
Qty: 18 TABLET | Refills: 11 | Status: SHIPPED | OUTPATIENT
Start: 2019-10-23 | End: 2020-09-02

## 2019-10-23 ASSESSMENT — ENCOUNTER SYMPTOMS
DECREASED APPETITE: 1
POOR WOUND HEALING: 1
FATIGUE: 1
NAUSEA: 1
HEADACHES: 1
LIGHT-HEADEDNESS: 1
BLOATING: 1
STIFFNESS: 1
ARTHRALGIAS: 1
SKIN CHANGES: 0
LOSS OF CONSCIOUSNESS: 1
TREMORS: 1
SPEECH CHANGE: 1
MYALGIAS: 1
NUMBNESS: 1
HYPERTENSION: 0
HEMOPTYSIS: 0
SHORTNESS OF BREATH: 1
MUSCLE WEAKNESS: 1
DYSPNEA ON EXERTION: 1
EXERCISE INTOLERANCE: 1
DIZZINESS: 1
DECREASED LIBIDO: 0
ALTERED TEMPERATURE REGULATION: 1
DYSURIA: 1
TASTE DISTURBANCE: 1
EYE IRRITATION: 1
SINUS PAIN: 1
HYPOTENSION: 1
SMELL DISTURBANCE: 1
ABDOMINAL PAIN: 1
HOARSE VOICE: 1
VOMITING: 0
HEMATURIA: 0
HALLUCINATIONS: 0
JAUNDICE: 0
PARALYSIS: 0
RECTAL PAIN: 0
BOWEL INCONTINENCE: 0
BACK PAIN: 1
FEVER: 0
BLOOD IN STOOL: 0
NECK MASS: 1
SLEEP DISTURBANCES DUE TO BREATHING: 1
MEMORY LOSS: 1
DOUBLE VISION: 1
DISTURBANCES IN COORDINATION: 1
HOT FLASHES: 0
SNORES LOUDLY: 1
JOINT SWELLING: 1
POLYPHAGIA: 0
SPUTUM PRODUCTION: 1
COUGH: 1
INCREASED ENERGY: 1
WEAKNESS: 1
SINUS CONGESTION: 1
SWOLLEN GLANDS: 1
SORE THROAT: 1
BRUISES/BLEEDS EASILY: 1
HEARTBURN: 1
EYE WATERING: 1
EYE PAIN: 1
NECK PAIN: 1
FLANK PAIN: 1
POSTURAL DYSPNEA: 1
MUSCLE CRAMPS: 1
EYE REDNESS: 1
WEIGHT LOSS: 1
POLYDIPSIA: 1
LEG PAIN: 1
SYNCOPE: 0
CONSTIPATION: 0
PALPITATIONS: 1
TROUBLE SWALLOWING: 1
DIFFICULTY URINATING: 1
WEIGHT GAIN: 0
NIGHT SWEATS: 1
CHILLS: 1
ORTHOPNEA: 1
DIARRHEA: 1
COUGH DISTURBING SLEEP: 1
NAIL CHANGES: 1
WHEEZING: 1
SEIZURES: 0
TINGLING: 1

## 2019-10-23 ASSESSMENT — PAIN SCALES - GENERAL: PAINLEVEL: SEVERE PAIN (7)

## 2019-10-23 ASSESSMENT — HEADACHE IMPACT TEST (HIT 6)
HOW OFTEN DID HEADACHS LIMIT CONCENTRATION ON WORK OR DAILY ACTIVITY: ALWAYS
HIT6 TOTAL SCORE: 71
HOW OFTEN HAVE YOU FELT TOO TIRED TO WORK BECAUSE OF YOUR HEADACHES: ALWAYS
WHEN YOU HAVE A HEADACHE HOW OFTEN DO YOU WISH YOU COULD LIE DOWN: VERY OFTEN
WHEN YOU HAVE HEADACHES HOW OFTEN IS THE PAIN SEVERE: ALWAYS
HOW OFTEN DO HEADACHES LIMIT YOUR DAILY ACTIVITIES: VERY OFTEN
HOW OFTEN HAVE YOU FELT FED UP OR IRRITATED BECAUSE OF YOUR HEADACHES: SOMETIMES

## 2019-10-23 ASSESSMENT — PATIENT HEALTH QUESTIONNAIRE - PHQ9: SUM OF ALL RESPONSES TO PHQ QUESTIONS 1-9: 0

## 2019-10-23 ASSESSMENT — MIFFLIN-ST. JEOR: SCORE: 1524.02

## 2019-10-23 NOTE — NURSING NOTE
Chief Complaint   Patient presents with     Headache     [facility] RETURN HEADACHE- F/U       Norris Stevenson, EMT

## 2019-10-23 NOTE — LETTER
10/23/2019       RE: Ada Welch  [address]  [place] [address]     Dear Colleague,    Thank you for referring your patient, Ada Welch, to the [facility] NEUROLOGY at [facility]. Please see a copy of my visit note below.        [facility]    Ada Welch MRN# [id number]   Age: 48 year old YOB: 1970     Requesting physician: Ellyn Nur            Assessment and Plan:   Assessment:  1.  Cerebral spinal fluid leak related to EDS suspected to be the cause of chronic headaches  2.  Chronic migraine  3.  Cervical spinal stenosis     Plan:  Encouraged the patient not to pursue Chiari malformation surgery.  I think we can use intermittent blood patches to help manage the headaches.  We can refer to Dr. Kraft at [facility] if we continue to have problems with this management strategy.    The patient had refills for her migraine medicines provided today.  Topamax and Zonegran should be avoided due to their potential to decrease CSF.    The patient has requested MRI of the spine for consultation with Dr. Moncada.  Dr. Galeano received this request but because I was seeing her today I placed those orders.    Follow-up with me in 6 months time.  I did refill all her acute medicines including Fioricet.    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  Work-up is never revealed a clear CSF leak but sometimes she is diagnosed with Chiari malformation up to 6 mm.  Last spring we tried a blind blood patch to just see if it would help alleviate the headaches and she had substantial response.  She had one more recently that was helping until she had a Lexiscan and now her  "headaches are back.    She is also having increased neck pain and is wanting to see Dr. Moncada per Dr. Galeano recommendation.  She was told that she needs updated MRI scans before this happens.    Overall the patient is happy with her current level of care for her pots she sees Dr. Russell as well as Dr. Triplett.    When she has an acute headache she is either using a combination of rizatriptan and sumatriptan with Fioricet.  Refills are needed.    25 minutes with the patient over 50% counseling.         Physical Exam:   /89   Pulse 76   Resp 16   Ht 1.702 m (5' 7\")   Wt 85.6 kg (188 lb 12.8 oz)   SpO2 96%   BMI 29.57 kg/m     Deferred         Data:   All laboratory data reviewed  All imaging studies reviewed by me             DATA for DOCUMENTATION:         Past Medical History:     Patient Active Problem List   Diagnosis     Pain syndrome, chronic     s/p TLIF L4-5,L5-S1 with solid arthrodesis     Tom-Danlos syndrome     Postlaminectomy syndrome, lumbar region     Headache     Cervicalgia     Urinary urgency     Nocturia     Cardiac device in situ- [employer] Implanted Loop Recorder (ILR)     Acquired hypothyroidism     Allergic rhinitis     Anxiety     Anxiety disorder     Astigmatism     Chronic sinusitis with recurrent bronchitis     Arthritis of facet joints at multiple vertebral levels     Arthritis involving multiple sites     Opioid dependence (H)     Cyst of fallopian tube     Depression     DNS (deviated nasal septum)     Dysuria     Easy bruising     Encounter for genetic counseling and testing     Essential hypertension with goal blood pressure less than 140/90     Head and neck lymphadenopathy     Hepatomegaly     Keratoconjunctivitis sicca (H)     Low back pain radiating to both legs     Liver lesion     Low back pain     Low back pain of multiple sites of spine with sciatica     Lumbar degenerative disc disease     Lumbar radiculopathy     Lymphadenopathy of left cervical region "     Intractable chronic migraine without aura and without status migrainosus     Nasal turbinate hypertrophy     Non compliance with medical treatment     Obese     Opioid type dependence, continuous (H)     Other acute postoperative pain     Pain disorder associated with psychological and physical factors     Presbyopia     Spondylolisthesis of lumbar region     Sinus tachycardia     Uterine endometriosis     Autonomic dysfunction     POTS (postural orthostatic tachycardia syndrome)     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     Chronic sinusitis 00/2007    Diagnosed with chronic pansinusitis 2016     Chronic tonsillitis 1980    Had tonsils removed 02/1981, rheumatic fever     Tom-Danlos disease      Eosinophilic esophagitis 08/2018     Gastroesophageal reflux disease 3/1/2017    I have large hiatal hernia     Hernia, abdominal 10/2016    Hiatal Hernia     Hiatal hernia 2016    Have adeline hiatel hernia     Hoarseness Unsure    It comes and goes     IBS (irritable bowel syndrome) with constipation     improved on Linzess     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     Migraines 1/1/2007    Unsure of exact date     Nasal polyps 2013    Were revoved 03/2017, benign     Other nervous system complications 1/2014    Autonomic nervous system disorder, neuralgia, neuropathy     Pneumonia Unsure    Have had pneumonia several times     Swelling, mass, or lump in head and neck 08/2016    Lymph node has been growing for last year     Thyroid disease 01/01/2008       Also see scanned health assessment forms.       Past Surgical History:     Past Surgical History:   Procedure Laterality Date     ADENOIDECTOMY  1981     AS REPAIR OF  NASAL SEPTUM       BACK SURGERY  2013  10/01/2014    Spinal fusion L4-S1, L5 moving 5/8ths in. Remove screws/rods     BIOPSY  2008 & 3/2017    mole biopsy, lymph node biopsy     COLONOSCOPY  3/2017    Colonoscopy and GI     ESOPHAGEAL IMPEDENCE FUNCTION TEST WITH 24 HOUR PH GREATER THAN 1 HOUR N/A 2019    Procedure: IMPEDANCE PH STUDY, ESOPHAGUS, 24 HOUR;  Surgeon: Raghavendra Grande MD;  Location:  GI     ESOPHAGOSCOPY, GASTROSCOPY, DUODENOSCOPY (EGD), COMBINED N/A 8/3/2018    Procedure: COMBINED ESOPHAGOSCOPY, GASTROSCOPY, DUODENOSCOPY (EGD), BIOPSY SINGLE OR MULTIPLE;;  Surgeon: Juan Woodson MD;  Location:  GI     ESOPHAGOSCOPY, GASTROSCOPY, DUODENOSCOPY (EGD), COMBINED N/A 10/22/2018    Procedure: COMBINED ESOPHAGOSCOPY, GASTROSCOPY, DUODENOSCOPY (EGD), BIOPSY SINGLE OR MULTIPLE;  Surgeon: Sadia Carolina MD;  Location:  GI     ESOPHAGOSCOPY, GASTROSCOPY, DUODENOSCOPY (EGD), COMBINED N/A 2018    Procedure: COMBINED ESOPHAGOSCOPY, GASTROSCOPY, DUODENOSCOPY (EGD);  Surgeon: Juan Woodson MD;  Location:  GI     NASAL/SINUS POLYPECTOMY      Polyps were benign     TONSILLECTOMY       TONSILLECTOMY  1981     TUBAL LIGATION              Social History:     Social History     Socioeconomic History     Marital status:      Spouse name: Carry     Number of children: 5     Years of education: Not on file     Highest education level: Not on file   Occupational History     Not on file   Social Needs     Financial resource strain: Not on file     Food insecurity:     Worry: Not on file     Inability: Not on file     Transportation needs:     Medical: Not on file     Non-medical: Not on file   Tobacco Use     Smoking status: Former Smoker     Packs/day: 0.20     Years: 10.00     Pack years: 2.00     Types: Cigarettes     Start date: 1991     Last attempt to quit: 2013     Years since quittin.8     Smokeless tobacco: Never Used     Tobacco comment: I somked on and  off during specified dates.   Substance and Sexual Activity     Alcohol use: Yes     Comment: occasional     Drug use: Yes     Types: Marijuana     Comment: medical marijuana     Sexual activity: Yes     Partners: Male     Birth control/protection: Female Surgical   Lifestyle     Physical activity:     Days per week: Not on file     Minutes per session: Not on file     Stress: Not on file   Relationships     Social connections:     Talks on phone: Not on file     Gets together: Not on file     Attends Hinduism service: Not on file     Active member of club or organization: Not on file     Attends meetings of clubs or organizations: Not on file     Relationship status: Not on file     Intimate partner violence:     Fear of current or ex partner: Not on file     Emotionally abused: Not on file     Physically abused: Not on file     Forced sexual activity: Not on file   Other Topics Concern     Parent/sibling w/ CABG, MI or angioplasty before 65F 55M? No   Social History Narrative    5 kids: 26, 25, 21, 15, 8 Brinn yo     works at [employer] for disability        5 yo and 6 months grandchildren Marcelo and Jennifer              Family History:     Family History   Problem Relation Age of Onset     Connective Tissue Disorder Mother         eds     Connective Tissue Disorder Sister         eds     Cancer Father         Spinal tumor grew into spinal cord, went in brain     Migraines Father      Diabetes Maternal Grandmother         Elderly diabetes     Heart Disease Maternal Grandmother      Hypertension Maternal Grandmother      Diabetes Paternal Grandmother         Elderly diabetes     Diabetes Paternal Grandfather         Elderly diabetes     Asthma Sister         Pediatric Asthma     Migraines Sister      Asthma Son         Pediatric Asthma     Thyroid Disease Sister         ,     Migraines Sister      Migraines Sister      Breast Cancer No family hx of             Medications:     Current  Outpatient Medications   Medication Sig     ACETAMINOPHEN PO Take 1,000 mg by mouth every 6 hours as needed for pain Take with tramadol     albuterol (PROAIR HFA/PROVENTIL HFA/VENTOLIN HFA) 108 (90 BASE) MCG/ACT Inhaler Inhale 2 puffs into the lungs every 6 hours as needed      buprenorphine (BUTRANS) 10 MCG/HR WK patch Place 1 patch onto the skin every 7 days     butalbital-acetaminophen-caffeine (FIORICET/ESGIC) -40 MG tablet TAKE ONE TABLET BY MOUTH EVERY 4 HOURS AS NEEDED. MAX 10 PER MONTH.     cetirizine (ZYRTEC) 10 MG tablet Take 10 mg by mouth daily      COMPRESSION STOCKINGS 1 each daily 20-30 mmHg Thigh Length measure and fit, color and style per patient preference. Doff n jose per need.     DULoxetine (CYMBALTA) 30 MG capsule Take 60mg in the morning and 30mg at bedtime     eletriptan (RELPAX) 20 MG tablet Take 1 tablet (20 mg) by mouth at onset of headache for migraine May repeat in 2 hours. Max 4 tablets/24 hours.     famotidine (PEPCID) 20 MG tablet Take 1 tablet (20 mg) by mouth At Bedtime     fluticasone (FLONASE) 50 MCG/ACT spray INSTILL 2 SPRAYS INTO BOTH NOSTRILS DAILY     levothyroxine (SYNTHROID/LEVOTHROID) 25 MCG tablet Take 1 tablet (25 mcg) by mouth daily     medical cannabis (Patient's own supply.  Not a prescription) Take 1 Dose by mouth See Admin Instructions (This is NOT a prescription, and does not certify that the patient has a qualifying medical condition for medical cannabis.  The purpose of this order is  to document that the patient reports taking medical cannabis.)     metoclopramide (REGLAN) 10 MG tablet Take 1 tablet (10 mg) by mouth 4 times daily (before meals and nightly)     Multiple Vitamins-Minerals (MULTIVITAMIN PO) Take 1 tablet by mouth daily      omeprazole (PRILOSEC) 40 MG capsule Take 1 capsule (40 mg) by mouth 2 times daily as needed (prn)     ondansetron (ZOFRAN-ODT) 4 MG ODT tab Take 1-2 tablets (4-8 mg) by mouth every 12 hours as needed for nausea      oxyCODONE (ROXICODONE) 5 MG tablet Take 0.5 tablets (2.5 mg) by mouth every 6 hours as needed for severe pain     Probiotic Product (PROBIOTIC DAILY PO) Take 2 packets by mouth every morning      propranolol ER (INDERAL LA) 120 MG 24 hr capsule Take 1 capsule (120 mg) by mouth daily     rizatriptan (MAXALT-MLT) 5 MG ODT TAKE 1-2 TABLETS (5-10 MG) BY MOUTH AT ONSET OF HEADACHE FOR MIGRAINE (MAX 30 MG IN 24 HOURS)     SUMAtriptan (IMITREX STATDOSE) 6 MG/0.5ML pen injector kit Inject 0.5 ml (6 mg) subcutaneously at onset of migraine, May repeat in 1 hour , Max 12 mg/24 hrs     tiZANidine (ZANAFLEX) 4 MG tablet Take 1 tablet (4 mg) by mouth 4 times daily as needed for muscle spasms     traMADol (ULTRAM) 50 MG tablet Take 1 tablet (50 mg) by mouth every 6 hours as needed for severe pain     EPINEPHrine (EPIPEN/ADRENACLICK/OR ANY BX GENERIC EQUIV) 0.3 MG/0.3ML injection 2-pack Inject 0.3 mLs (0.3 mg) into the muscle as needed for anaphylaxis (Patient not taking: Reported on 10/23/2019)     Nerve Stimulator (CEFALY KIT) KAROLINE 1 Device daily as needed (Patient not taking: Reported on 9/19/2019)     senna (SENOKOT) 8.6 MG tablet Take 17.2-34.4 mg by mouth     vitamin B complex with vitamin C (VITAMIN  B COMPLEX) TABS tablet Take 1 tablet by mouth daily     VITAMIN D, CHOLECALCIFEROL, PO Take 2,000 Units by mouth daily     No current facility-administered medications for this visit.               Review of Systems:   A comprehensive 10 point review of systems (constitutional, ENT, cardiac, peripheral vascular, lymphatic, respiratory, GI, , Musculoskeletal, skin, Neurological) was performed and found to be negative except as described in this note.     See intake form completed by patient  Answers for HPI/ROS submitted by the patient on 10/23/2019   General Symptoms: Yes  Skin Symptoms: Yes  HENT Symptoms: Yes  EYE SYMPTOMS: Yes  HEART SYMPTOMS: Yes  LUNG SYMPTOMS: Yes  INTESTINAL SYMPTOMS: Yes  URINARY SYMPTOMS:  Yes  GYNECOLOGIC SYMPTOMS: Yes  BREAST SYMPTOMS: No  SKELETAL SYMPTOMS: Yes  BLOOD SYMPTOMS: Yes  NERVOUS SYSTEM SYMPTOMS: Yes  MENTAL HEALTH SYMPTOMS: No  Fever: No  Loss of appetite: Yes  Weight loss: Yes  Weight gain: No  Fatigue: Yes  Night sweats: Yes  Chills: Yes  Increased stress: No  Excessive hunger: No  Excessive thirst: Yes  Feeling hot or cold when others believe the temperature is normal: Yes  Loss of height: No  Post-operative complications: Yes  Surgical site pain: Yes  Hallucinations: No  Change in or Loss of Energy: Yes  Hyperactivity: No  Confusion: Yes  Changes in hair: Yes  Changes in moles/birth marks: No  Itching: Yes  Rashes: Yes  Changes in nails: Yes  Acne: No  Hair in places you don't want it: No  Change in facial hair: No  Warts: No  Non-healing sores: Yes  Scarring: Yes  Flaking of skin: Yes  Color changes of hands/feet in cold : Yes  Sun sensitivity: Yes  Skin thickening: Yes  Ear pain: Yes  Ear discharge: Yes  Hearing loss: Yes  Tinnitus: Yes  Nosebleeds: No  Congestion: Yes  Sinus pain: Yes  Trouble swallowing: Yes   Voice hoarseness: Yes  Mouth sores: No  Sore throat: Yes  Tooth pain: Yes  Gum tenderness: Yes  Bleeding gums: No  Change in taste: Yes  Change in sense of smell: Yes  Dry mouth: Yes  Hearing aid used: No  Neck lump: Yes  Eye pain: Yes  Vision loss: Yes  Dry eyes: Yes  Watery eyes: Yes  Eye bulging: No  Double vision: Yes  Flashing of lights: Yes  Spots: Yes  Floaters: Yes  Redness: Yes  Crossed eyes: No  Tunnel Vision: Yes  Yellowing of eyes: No  Eye irritation: Yes  Cough: Yes  Sputum or phlegm: Yes  Coughing up blood: No  Difficulty breating or shortness of breath: Yes  Snoring: Yes  Wheezing: Yes  Difficulty breathing on exertion: Yes  Nighttime Cough: Yes  Difficulty breathing when lying flat: Yes  Chest pain or pressure: Yes  Fast or irregular heartbeat: Yes  Pain in legs with walking: Yes  Trouble breathing while lying down: Yes  Fingers or toes appear blue:  Yes  High blood pressure: No  Low blood pressure: Yes  Fainting: No  Murmurs: No  Pacemaker: No  Varicose veins: No  Edema or swelling: Yes  Wake up at night with shortness of breath: Yes  Light-headedness: Yes  Exercise intolerance: Yes  Heart burn or indigestion: Yes  Nausea: Yes  Vomiting: No  Abdominal pain: Yes  Bloating: Yes  Constipation: No  Diarrhea: Yes  Blood in stool: No  Black stools: No  Rectal or Anal pain: No  Fecal incontinence: No  Yellowing of skin or eyes: No  Vomit with blood: No  Change in stools: Yes  Trouble holding urine or incontinence: Yes  Pain or burning: Yes  Trouble starting or stopping: Yes  Increased frequency of urination: Yes  Blood in urine: No  Decreased frequency of urination: No  Frequent nighttime urination: Yes  Flank pain: Yes  Difficulty emptying bladder: Yes  Back pain: Yes  Muscle aches: Yes  Neck pain: Yes  Swollen joints: Yes  Joint pain: Yes  Bone pain: Yes  Muscle cramps: Yes  Muscle weakness: Yes  Joint stiffness: Yes  Bone fracture: No  Anemia: No  Swollen glands: Yes  Easy bleeding or bruising: Yes  Trouble with coordination: Yes  Dizziness or trouble with balance: Yes  Fainting or black-out spells: Yes  Memory loss: Yes  Headache: Yes  Seizures: No  Speech problems: Yes  Tingling: Yes  Tremor: Yes  Weakness: Yes  Difficulty walking: Yes  Paralysis: No  Numbness: Yes  Bleeding or spotting between periods: No  Heavy or painful periods: Yes  Irregular periods: No  Vaginal discharge: No  Hot flashes: No  Vaginal dryness: Yes  Genital ulcers: No  Reduced libido: No  Painful intercourse: No  Difficulty with sexual arousal: No  Post-menopausal bleeding: No      Again, thank you for allowing me to participate in the care of your patient.      Sincerely,    Noemy Nguyen MD

## 2019-10-23 NOTE — PROGRESS NOTES
[facility]    Ada Welch MRN# [id number]   Age: 48 year old YOB: 1970     Requesting physician: Ellyn Nur            Assessment and Plan:   Assessment:  1.  Cerebral spinal fluid leak related to EDS suspected to be the cause of chronic headaches  2.  Chronic migraine  3.  Cervical spinal stenosis     Plan:  Encouraged the patient not to pursue Chiari malformation surgery.  I think we can use intermittent blood patches to help manage the headaches.  We can refer to Dr. Kraft at [facility] [place] if we continue to have problems with this management strategy.    The patient had refills for her migraine medicines provided today.  Topamax and Zonegran should be avoided due to their potential to decrease CSF.    The patient has requested MRI of the spine for consultation with Dr. Moncada.  Dr. Galeano received this request but because I was seeing her today I placed those orders.    Follow-up with me in 6 months time.  I did refill all her acute medicines including Fioricet.    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  Work-up is never revealed a clear CSF leak but sometimes she is diagnosed with Chiari malformation up to 6 mm.  Last spring we tried a blind blood patch to just see if it would help alleviate the headaches and she had substantial response.  She had one more recently that was helping until she had a Lexiscan and now her headaches are back.    She is also having increased neck pain and is wanting to see Dr. Moncada per Dr. Galeano recommendation.  She was told that she needs updated MRI scans before this happens.    Overall the patient is happy with her current level of care for her pots she  "sees Dr. Russell as well as Dr. Triplett.    When she has an acute headache she is either using a combination of rizatriptan and sumatriptan with Fioricet.  Refills are needed.    25 minutes with the patient over 50% counseling.         Physical Exam:   /89   Pulse 76   Resp 16   Ht 1.702 m (5' 7\")   Wt 85.6 kg (188 lb 12.8 oz)   SpO2 96%   BMI 29.57 kg/m    Deferred         Data:   All laboratory data reviewed  All imaging studies reviewed by me             DATA for DOCUMENTATION:         Past Medical History:     Patient Active Problem List   Diagnosis     Pain syndrome, chronic     s/p TLIF L4-5,L5-S1 with solid arthrodesis     Tom-Danlos syndrome     Postlaminectomy syndrome, lumbar region     Headache     Cervicalgia     Urinary urgency     Nocturia     Cardiac device in situ- [employer] Implanted Loop Recorder (ILR)     Acquired hypothyroidism     Allergic rhinitis     Anxiety     Anxiety disorder     Astigmatism     Chronic sinusitis with recurrent bronchitis     Arthritis of facet joints at multiple vertebral levels     Arthritis involving multiple sites     Opioid dependence (H)     Cyst of fallopian tube     Depression     DNS (deviated nasal septum)     Dysuria     Easy bruising     Encounter for genetic counseling and testing     Essential hypertension with goal blood pressure less than 140/90     Head and neck lymphadenopathy     Hepatomegaly     Keratoconjunctivitis sicca (H)     Low back pain radiating to both legs     Liver lesion     Low back pain     Low back pain of multiple sites of spine with sciatica     Lumbar degenerative disc disease     Lumbar radiculopathy     Lymphadenopathy of left cervical region     Intractable chronic migraine without aura and without status migrainosus     Nasal turbinate hypertrophy     Non compliance with medical treatment     Obese     Opioid type dependence, continuous (H)     Other acute postoperative pain     Pain disorder associated with " psychological and physical factors     Presbyopia     Spondylolisthesis of lumbar region     Sinus tachycardia     Uterine endometriosis     Autonomic dysfunction     POTS (postural orthostatic tachycardia syndrome)     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     Chronic sinusitis 00/2007    Diagnosed with chronic pansinusitis 2016     Chronic tonsillitis 1980    Had tonsils removed 02/1981, rheumatic fever     Tom-Danlos disease      Eosinophilic esophagitis 08/2018     Gastroesophageal reflux disease 3/1/2017    I have large hiatal hernia     Hernia, abdominal 10/2016    Hiatal Hernia     Hiatal hernia 2016    Have adeline hiatel hernia     Hoarseness Unsure    It comes and goes     IBS (irritable bowel syndrome) with constipation     improved on Linzess     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     Migraines 1/1/2007    Unsure of exact date     Nasal polyps 2013    Were revoved 03/2017, benign     Other nervous system complications 1/2014    Autonomic nervous system disorder, neuralgia, neuropathy     Pneumonia Unsure    Have had pneumonia several times     Swelling, mass, or lump in head and neck 08/2016    Lymph node has been growing for last year     Thyroid disease 01/01/2008       Also see scanned health assessment forms.       Past Surgical History:     Past Surgical History:   Procedure Laterality Date     ADENOIDECTOMY  1981     AS REPAIR OF NASAL SEPTUM       BACK SURGERY  12/09/2013  10/01/2014    Spinal fusion L4-S1, L5 moving 5/8ths in. Remove screws/rods     BIOPSY  01/01/2008 & 3/2017    mole biopsy, lymph node biopsy     COLONOSCOPY  3/2017    Colonoscopy and GI     ESOPHAGEAL IMPEDENCE FUNCTION TEST WITH  24 HOUR PH GREATER THAN 1 HOUR N/A 2019    Procedure: IMPEDANCE PH STUDY, ESOPHAGUS, 24 HOUR;  Surgeon: Raghavendra Grande MD;  Location:  GI     ESOPHAGOSCOPY, GASTROSCOPY, DUODENOSCOPY (EGD), COMBINED N/A 8/3/2018    Procedure: COMBINED ESOPHAGOSCOPY, GASTROSCOPY, DUODENOSCOPY (EGD), BIOPSY SINGLE OR MULTIPLE;;  Surgeon: Juan Woodson MD;  Location:  GI     ESOPHAGOSCOPY, GASTROSCOPY, DUODENOSCOPY (EGD), COMBINED N/A 10/22/2018    Procedure: COMBINED ESOPHAGOSCOPY, GASTROSCOPY, DUODENOSCOPY (EGD), BIOPSY SINGLE OR MULTIPLE;  Surgeon: Sadia Carolina MD;  Location:  GI     ESOPHAGOSCOPY, GASTROSCOPY, DUODENOSCOPY (EGD), COMBINED N/A 2018    Procedure: COMBINED ESOPHAGOSCOPY, GASTROSCOPY, DUODENOSCOPY (EGD);  Surgeon: Juan Woodson MD;  Location:  GI     NASAL/SINUS POLYPECTOMY  2017    Polyps were benign     TONSILLECTOMY       TONSILLECTOMY  1981     TUBAL LIGATION              Social History:     Social History     Socioeconomic History     Marital status:      Spouse name: Carry     Number of children: 5     Years of education: Not on file     Highest education level: Not on file   Occupational History     Not on file   Social Needs     Financial resource strain: Not on file     Food insecurity:     Worry: Not on file     Inability: Not on file     Transportation needs:     Medical: Not on file     Non-medical: Not on file   Tobacco Use     Smoking status: Former Smoker     Packs/day: 0.20     Years: 10.00     Pack years: 2.00     Types: Cigarettes     Start date: 1991     Last attempt to quit: 2013     Years since quittin.8     Smokeless tobacco: Never Used     Tobacco comment: I somked on and off during specified dates.   Substance and Sexual Activity     Alcohol use: Yes     Comment: occasional     Drug use: Yes     Types: Marijuana     Comment: medical marijuana     Sexual activity: Yes     Partners: Male     Birth control/protection: Female Surgical   Lifestyle      Physical activity:     Days per week: Not on file     Minutes per session: Not on file     Stress: Not on file   Relationships     Social connections:     Talks on phone: Not on file     Gets together: Not on file     Attends Rastafari service: Not on file     Active member of club or organization: Not on file     Attends meetings of clubs or organizations: Not on file     Relationship status: Not on file     Intimate partner violence:     Fear of current or ex partner: Not on file     Emotionally abused: Not on file     Physically abused: Not on file     Forced sexual activity: Not on file   Other Topics Concern     Parent/sibling w/ CABG, MI or angioplasty before 65F 55M? No   Social History Narrative    5 kids: 26, 25, 21, 15, 8 Brinn yo     works at [employer] for disability        5 yo and 6 months grandchildren Marcelo and Jennifer              Family History:     Family History   Problem Relation Age of Onset     Connective Tissue Disorder Mother         eds     Connective Tissue Disorder Sister         eds     Cancer Father         Spinal tumor grew into spinal cord, went in brain     Migraines Father      Diabetes Maternal Grandmother         Elderly diabetes     Heart Disease Maternal Grandmother      Hypertension Maternal Grandmother      Diabetes Paternal Grandmother         Elderly diabetes     Diabetes Paternal Grandfather         Elderly diabetes     Asthma Sister         Pediatric Asthma     Migraines Sister      Asthma Son         Pediatric Asthma     Thyroid Disease Sister         ,     Migraines Sister      Migraines Sister      Breast Cancer No family hx of             Medications:     Current Outpatient Medications   Medication Sig     ACETAMINOPHEN PO Take 1,000 mg by mouth every 6 hours as needed for pain Take with tramadol     albuterol (PROAIR HFA/PROVENTIL HFA/VENTOLIN HFA) 108 (90 BASE) MCG/ACT Inhaler Inhale 2 puffs into the lungs every 6 hours as needed       buprenorphine (BUTRANS) 10 MCG/HR WK patch Place 1 patch onto the skin every 7 days     butalbital-acetaminophen-caffeine (FIORICET/ESGIC) -40 MG tablet TAKE ONE TABLET BY MOUTH EVERY 4 HOURS AS NEEDED. MAX 10 PER MONTH.     cetirizine (ZYRTEC) 10 MG tablet Take 10 mg by mouth daily      COMPRESSION STOCKINGS 1 each daily 20-30 mmHg Thigh Length measure and fit, color and style per patient preference. Doshar n jose per need.     DULoxetine (CYMBALTA) 30 MG capsule Take 60mg in the morning and 30mg at bedtime     eletriptan (RELPAX) 20 MG tablet Take 1 tablet (20 mg) by mouth at onset of headache for migraine May repeat in 2 hours. Max 4 tablets/24 hours.     famotidine (PEPCID) 20 MG tablet Take 1 tablet (20 mg) by mouth At Bedtime     fluticasone (FLONASE) 50 MCG/ACT spray INSTILL 2 SPRAYS INTO BOTH NOSTRILS DAILY     levothyroxine (SYNTHROID/LEVOTHROID) 25 MCG tablet Take 1 tablet (25 mcg) by mouth daily     medical cannabis (Patient's own supply.  Not a prescription) Take 1 Dose by mouth See Admin Instructions (This is NOT a prescription, and does not certify that the patient has a qualifying medical condition for medical cannabis.  The purpose of this order is  to document that the patient reports taking medical cannabis.)     metoclopramide (REGLAN) 10 MG tablet Take 1 tablet (10 mg) by mouth 4 times daily (before meals and nightly)     Multiple Vitamins-Minerals (MULTIVITAMIN PO) Take 1 tablet by mouth daily      omeprazole (PRILOSEC) 40 MG capsule Take 1 capsule (40 mg) by mouth 2 times daily as needed (prn)     ondansetron (ZOFRAN-ODT) 4 MG ODT tab Take 1-2 tablets (4-8 mg) by mouth every 12 hours as needed for nausea     oxyCODONE (ROXICODONE) 5 MG tablet Take 0.5 tablets (2.5 mg) by mouth every 6 hours as needed for severe pain     Probiotic Product (PROBIOTIC DAILY PO) Take 2 packets by mouth every morning      propranolol ER (INDERAL LA) 120 MG 24 hr capsule Take 1 capsule (120 mg) by mouth daily      rizatriptan (MAXALT-MLT) 5 MG ODT TAKE 1-2 TABLETS (5-10 MG) BY MOUTH AT ONSET OF HEADACHE FOR MIGRAINE (MAX 30 MG IN 24 HOURS)     SUMAtriptan (IMITREX STATDOSE) 6 MG/0.5ML pen injector kit Inject 0.5 ml (6 mg) subcutaneously at onset of migraine, May repeat in 1 hour , Max 12 mg/24 hrs     tiZANidine (ZANAFLEX) 4 MG tablet Take 1 tablet (4 mg) by mouth 4 times daily as needed for muscle spasms     traMADol (ULTRAM) 50 MG tablet Take 1 tablet (50 mg) by mouth every 6 hours as needed for severe pain     EPINEPHrine (EPIPEN/ADRENACLICK/OR ANY BX GENERIC EQUIV) 0.3 MG/0.3ML injection 2-pack Inject 0.3 mLs (0.3 mg) into the muscle as needed for anaphylaxis (Patient not taking: Reported on 10/23/2019)     Nerve Stimulator (CEFALY KIT) KAROLINE 1 Device daily as needed (Patient not taking: Reported on 9/19/2019)     senna (SENOKOT) 8.6 MG tablet Take 17.2-34.4 mg by mouth     vitamin B complex with vitamin C (VITAMIN  B COMPLEX) TABS tablet Take 1 tablet by mouth daily     VITAMIN D, CHOLECALCIFEROL, PO Take 2,000 Units by mouth daily     No current facility-administered medications for this visit.               Review of Systems:   A comprehensive 10 point review of systems (constitutional, ENT, cardiac, peripheral vascular, lymphatic, respiratory, GI, , Musculoskeletal, skin, Neurological) was performed and found to be negative except as described in this note.     See intake form completed by patient  Answers for HPI/ROS submitted by the patient on 10/23/2019   General Symptoms: Yes  Skin Symptoms: Yes  HENT Symptoms: Yes  EYE SYMPTOMS: Yes  HEART SYMPTOMS: Yes  LUNG SYMPTOMS: Yes  INTESTINAL SYMPTOMS: Yes  URINARY SYMPTOMS: Yes  GYNECOLOGIC SYMPTOMS: Yes  BREAST SYMPTOMS: No  SKELETAL SYMPTOMS: Yes  BLOOD SYMPTOMS: Yes  NERVOUS SYSTEM SYMPTOMS: Yes  MENTAL HEALTH SYMPTOMS: No  Fever: No  Loss of appetite: Yes  Weight loss: Yes  Weight gain: No  Fatigue: Yes  Night sweats: Yes  Chills: Yes  Increased stress:  No  Excessive hunger: No  Excessive thirst: Yes  Feeling hot or cold when others believe the temperature is normal: Yes  Loss of height: No  Post-operative complications: Yes  Surgical site pain: Yes  Hallucinations: No  Change in or Loss of Energy: Yes  Hyperactivity: No  Confusion: Yes  Changes in hair: Yes  Changes in moles/birth marks: No  Itching: Yes  Rashes: Yes  Changes in nails: Yes  Acne: No  Hair in places you don't want it: No  Change in facial hair: No  Warts: No  Non-healing sores: Yes  Scarring: Yes  Flaking of skin: Yes  Color changes of hands/feet in cold : Yes  Sun sensitivity: Yes  Skin thickening: Yes  Ear pain: Yes  Ear discharge: Yes  Hearing loss: Yes  Tinnitus: Yes  Nosebleeds: No  Congestion: Yes  Sinus pain: Yes  Trouble swallowing: Yes   Voice hoarseness: Yes  Mouth sores: No  Sore throat: Yes  Tooth pain: Yes  Gum tenderness: Yes  Bleeding gums: No  Change in taste: Yes  Change in sense of smell: Yes  Dry mouth: Yes  Hearing aid used: No  Neck lump: Yes  Eye pain: Yes  Vision loss: Yes  Dry eyes: Yes  Watery eyes: Yes  Eye bulging: No  Double vision: Yes  Flashing of lights: Yes  Spots: Yes  Floaters: Yes  Redness: Yes  Crossed eyes: No  Tunnel Vision: Yes  Yellowing of eyes: No  Eye irritation: Yes  Cough: Yes  Sputum or phlegm: Yes  Coughing up blood: No  Difficulty breating or shortness of breath: Yes  Snoring: Yes  Wheezing: Yes  Difficulty breathing on exertion: Yes  Nighttime Cough: Yes  Difficulty breathing when lying flat: Yes  Chest pain or pressure: Yes  Fast or irregular heartbeat: Yes  Pain in legs with walking: Yes  Trouble breathing while lying down: Yes  Fingers or toes appear blue: Yes  High blood pressure: No  Low blood pressure: Yes  Fainting: No  Murmurs: No  Pacemaker: No  Varicose veins: No  Edema or swelling: Yes  Wake up at night with shortness of breath: Yes  Light-headedness: Yes  Exercise intolerance: Yes  Heart burn or indigestion: Yes  Nausea: Yes  Vomiting:  No  Abdominal pain: Yes  Bloating: Yes  Constipation: No  Diarrhea: Yes  Blood in stool: No  Black stools: No  Rectal or Anal pain: No  Fecal incontinence: No  Yellowing of skin or eyes: No  Vomit with blood: No  Change in stools: Yes  Trouble holding urine or incontinence: Yes  Pain or burning: Yes  Trouble starting or stopping: Yes  Increased frequency of urination: Yes  Blood in urine: No  Decreased frequency of urination: No  Frequent nighttime urination: Yes  Flank pain: Yes  Difficulty emptying bladder: Yes  Back pain: Yes  Muscle aches: Yes  Neck pain: Yes  Swollen joints: Yes  Joint pain: Yes  Bone pain: Yes  Muscle cramps: Yes  Muscle weakness: Yes  Joint stiffness: Yes  Bone fracture: No  Anemia: No  Swollen glands: Yes  Easy bleeding or bruising: Yes  Trouble with coordination: Yes  Dizziness or trouble with balance: Yes  Fainting or black-out spells: Yes  Memory loss: Yes  Headache: Yes  Seizures: No  Speech problems: Yes  Tingling: Yes  Tremor: Yes  Weakness: Yes  Difficulty walking: Yes  Paralysis: No  Numbness: Yes  Bleeding or spotting between periods: No  Heavy or painful periods: Yes  Irregular periods: No  Vaginal discharge: No  Hot flashes: No  Vaginal dryness: Yes  Genital ulcers: No  Reduced libido: No  Painful intercourse: No  Difficulty with sexual arousal: No  Post-menopausal bleeding: No

## 2019-10-31 ENCOUNTER — OFFICE VISIT (OUTPATIENT)
Dept: ANESTHESIOLOGY | Facility: CLINIC | Age: 49
End: 2019-10-31
Payer: COMMERCIAL

## 2019-10-31 VITALS — SYSTOLIC BLOOD PRESSURE: 135 MMHG | HEART RATE: 80 BPM | DIASTOLIC BLOOD PRESSURE: 87 MMHG

## 2019-10-31 DIAGNOSIS — M79.2 NEUROPATHIC PAIN: Primary | ICD-10-CM

## 2019-10-31 RX ORDER — AMITRIPTYLINE HYDROCHLORIDE 10 MG/1
10 TABLET ORAL AT BEDTIME
Qty: 60 TABLET | Refills: 1 | Status: SHIPPED | OUTPATIENT
Start: 2019-10-31 | End: 2020-09-01

## 2019-10-31 ASSESSMENT — ANXIETY QUESTIONNAIRES
4. TROUBLE RELAXING: NOT AT ALL
2. NOT BEING ABLE TO STOP OR CONTROL WORRYING: NOT AT ALL
GAD7 TOTAL SCORE: 0
7. FEELING AFRAID AS IF SOMETHING AWFUL MIGHT HAPPEN: NOT AT ALL
3. WORRYING TOO MUCH ABOUT DIFFERENT THINGS: NOT AT ALL
7. FEELING AFRAID AS IF SOMETHING AWFUL MIGHT HAPPEN: NOT AT ALL
1. FEELING NERVOUS, ANXIOUS, OR ON EDGE: NOT AT ALL
5. BEING SO RESTLESS THAT IT IS HARD TO SIT STILL: NOT AT ALL
6. BECOMING EASILY ANNOYED OR IRRITABLE: NOT AT ALL
GAD7 TOTAL SCORE: 0

## 2019-10-31 ASSESSMENT — PAIN SCALES - GENERAL: PAINLEVEL: EXTREME PAIN (9)

## 2019-10-31 NOTE — PATIENT INSTRUCTIONS
1. Continue all other medications as prescribed.     2. Start Amitriptyline 10 mg- Take 1 tablet (10 mg) by mouth At Bedtime.      Follow up: 3-6 months or earlier if needed.       To speak with a nurse, schedule/reschedule/cancel a clinic appointment, or request a medication refill call: [phone]     You can also reach us by [place]: [email or web address]    For refills, please call on Monday, 1 week before your medication runs out. The doctors are not always in clinic, so this gives us time to get your prescriptions ready.  Please let us know the name of the medication you are requesting a refill of.

## 2019-10-31 NOTE — LETTER
RE: Ada Welch  [address] [place] [address]     Dear Colleague,    Thank you for referring your patient, Ada Welch, to the [facility] FOR COMPREHENSIVE PAIN MANAGEMENT at [facility]. Please see a copy of my visit note below.    [facility] for Comprehensive Chronic Pain Management : Progress Note    Date of visit: 10/31/2019    Interval history:  Ada Welch is a 48 year old female  is known to me for chronic low back pain. The patient has very complex history of low back pain for ~24 years for which she underwent L4-S1 fusion (TLIF) 12/9/13.  Postoperatively, her back pain worsened (mainly LBP, but also radiated down both lower limbs in an S1 dermatomal distribution).   Underwent removal of posterior instrumentation (screws and guadalupe).  After fusion removal, her pain continued.  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  She even sought care for her EDS by a specialist in [facility] who she saw twice. The patient has been seeing a neurologist Dr. Noemy Nguyen for chronic migraines.  She has tried Aimovig with limited benefit. Patient then switched Emgality.     She has a h/o significant autonomic dysfunction.  Since 2014, she reports that her heart rate has fluctuated from as low as  beats per minutes and her blood pressure also fluctuates at the same time without any aggravating factors.  The fluctuation of the blood pressure and heart rate happens when she is supine, sitting, standing, walking, etc.  She had 2 episodes of blacking out for a few seconds.  She has seen cardiologist Dr. Russell who advised her to continue fludrocortisone, isometric exercise, low carbohydrate and low gluten diet.     She has been seeing Dr. Singh for urinary urgency and incontinence.  She is advised for improving lifestyle, dietary modification, optimizing bowel regimen and to start pelvic physical  therapy.     Patient reports that her chronic intractable headache pain recently got worse.  Recent work-up never revealed a clear CSF leak however she was diagnosed with spontaneous intracranial hypotension.  She has undergone blood patches intermittently for her headache pain.  She already had the 4 blood patches: It did help some of her headache pain.    She has been managing her pain with stable dose of Butrans 10 mcg per hour q 7 days, tramadol 50 mg every 6 hours as needed which she refills every 2-3 months.  In addition she takes tizanidine 4 mg 4 times daily as needed.  She takes sumatriptan for breakthrough migraine.  Occasionally she takes oxycodone 5 mg when her pain gets significantly worse.       [place] Prescription Monitoring Program:   Reviewed. No concerns     Review of Systems:  The 14 system ROS was reviewed and was negative except what is documented above and as follows.  Any bowel or bladder problems: none  Mood: okay    Physical Exam:  Vitals:    10/31/19 1015   BP: 135/87   Pulse: 80       General: Awake in no apparent distress.   Eyes: Sclerae are anicteric. PERRLA, EOMI   Neck: supple, no masses.  Collar in place   lungs: unlabored.   Heart: regular rate and rhythm   Abdomen: soft non tender.  Extremities: Pulses are well palpable, no peripheral edema.   Musculoskeletal: 5/5 muscle strength in all extremities.   Neurologic exam:Sensation intact throughout all dermatomes bilateral upper extremities and lower extremities  Psychiatric; Normal affect.   Skin: Warm and Dry.    Medications:  Current Outpatient Medications   Medication Sig Dispense Refill     ACETAMINOPHEN PO Take 1,000 mg by mouth every 6 hours as needed for pain Take with tramadol       albuterol (PROAIR HFA/PROVENTIL HFA/VENTOLIN HFA) 108 (90 BASE) MCG/ACT Inhaler Inhale 2 puffs into the lungs every 6 hours as needed        buprenorphine (BUTRANS) 10 MCG/HR WK patch Place 1 patch onto the skin every 7 days 4 patch 1      butalbital-acetaminophen-caffeine (FIORICET/ESGIC) -40 MG tablet TAKE ONE TABLET BY MOUTH EVERY 4 HOURS AS NEEDED. MAX 10 PER MONTH. 20 tablet 0     cetirizine (ZYRTEC) 10 MG tablet Take 10 mg by mouth daily        COMPRESSION STOCKINGS 1 each daily 20-30 mmHg Thigh Length measure and fit, color and style per patient preference. Antolin n jose per need. 3 each 3     DULoxetine (CYMBALTA) 30 MG capsule Take 60mg in the morning and 30mg at bedtime 270 capsule 3     famotidine (PEPCID) 20 MG tablet Take 1 tablet (20 mg) by mouth At Bedtime 90 tablet 3     fluticasone (FLONASE) 50 MCG/ACT spray INSTILL 2 SPRAYS INTO BOTH NOSTRILS DAILY 48 mL 3     levothyroxine (SYNTHROID/LEVOTHROID) 25 MCG tablet Take 1 tablet (25 mcg) by mouth daily 90 tablet 2     medical cannabis (Patient's own supply.  Not a prescription) Take 1 Dose by mouth See Admin Instructions (This is NOT a prescription, and does not certify that the patient has a qualifying medical condition for medical cannabis.  The purpose of this order is  to document that the patient reports taking medical cannabis.)       metoclopramide (REGLAN) 10 MG tablet Take 1 tablet (10 mg) by mouth 4 times daily (before meals and nightly) 40 tablet 3     Multiple Vitamins-Minerals (MULTIVITAMIN PO) Take 1 tablet by mouth daily        omeprazole (PRILOSEC) 40 MG capsule Take 1 capsule (40 mg) by mouth 2 times daily as needed (prn) 180 capsule 2     ondansetron (ZOFRAN-ODT) 4 MG ODT tab Take 1-2 tablets (4-8 mg) by mouth every 12 hours as needed for nausea 60 tablet 1     oxyCODONE (ROXICODONE) 5 MG tablet Take 0.5 tablets (2.5 mg) by mouth every 6 hours as needed for severe pain 15 tablet 0     Probiotic Product (PROBIOTIC DAILY PO) Take 2 packets by mouth every morning        propranolol ER (INDERAL LA) 120 MG 24 hr capsule Take 1 capsule (120 mg) by mouth daily 30 capsule 11     rizatriptan (MAXALT-MLT) 5 MG ODT TAKE 1-2 TABLETS (5-10 MG) BY MOUTH AT ONSET OF HEADACHE FOR  MIGRAINE (MAX 30 MG IN 24 HOURS) 18 tablet 11     senna (SENOKOT) 8.6 MG tablet Take 17.2-34.4 mg by mouth       SUMAtriptan (IMITREX STATDOSE) 6 MG/0.5ML pen injector kit Inject 0.5 ml (6 mg) subcutaneously at onset of migraine, May repeat in 1 hour , Max 12 mg/24 hrs 2 kit 11     tiZANidine (ZANAFLEX) 4 MG tablet Take 1 tablet (4 mg) by mouth 4 times daily as needed for muscle spasms 360 tablet 3     traMADol (ULTRAM) 50 MG tablet Take 1 tablet (50 mg) by mouth every 6 hours as needed for severe pain 60 tablet 0     vitamin B complex with vitamin C (VITAMIN  B COMPLEX) TABS tablet Take 1 tablet by mouth daily       VITAMIN D, CHOLECALCIFEROL, PO Take 2,000 Units by mouth daily         Analgesic Medications:   Medications related to Pain Management (From now, onward)    None             LABORATORY VALUES:   Recent Labs   Lab Test 03/03/19  1904 12/17/18  0920    138   POTASSIUM 3.4 4.2   CHLORIDE 102 107   CO2 25 21   ANIONGAP 10 10   GLC 95 124*   BUN 9 7   CR 0.82 0.71   JUAN 8.6 8.9       CBC RESULTS:   Recent Labs   Lab Test 03/03/19  1904   WBC 8.9   RBC 4.91   HGB 13.7   HCT 41.6   MCV 85   MCH 27.9   MCHC 32.9   RDW 13.7          Most Recent 3 INR's:  Recent Labs   Lab Test 02/28/19  1045 12/17/18  0920 07/06/18  2229   INR 0.97 0.90 0.96           ASSESSMENT:    Chronic pain syndrome  Failed back surgery syndrome  Intractable migraine  Tom-Danlos syndrome  Dysautonomia  Gastroparesis  Urinary urgency and incontinence  Chronic, continuous use of opioid  Intractable chronic migraine without aura and without status migrainosus  Spontaneous intracranial hypotension status post epidural blood patch x4         PLAN:    1. Medications.   -Continue transdermal Butrans 10 mcg per hour q 7 days. She has #3 patches remaining at home. 3 refills provided today, with post dated fill date 3/9/19.  -Continue propranolol 120 mg p.o. daily  -Continued Zanaflex 4 mg 4 times daily as needed  -Continue tramadol  50 mg p.o. every 6 hours as needed.  Post dated prescription of 60 tablets given today with fill date 3/7/19 and 1 refill.  -Amitriptyline 50 mg nightly  -Cymbalta continued 60 mg AM and 30 mg PM  -Maxalt 5-10 mg p.o. at onset of the headache for migraine (max 30 mg in 24 hours)        2. Interventional procedures:  The patient had post dural puncture headache after epidural blood patch performed at an outside facility.  Recommend to perform epidural blood patch under fluoroscopic guidance.  If the patient decides to perform epidural blood patch for benign intracranial hypertension, I recommend that she is placed in a Trendelenburg position for 30 minutes after the procedure.  Then she will need to be placed in the prone position for another 30 minutes.    3. Labs and imaging:   -None needed for pain management.      4. Rehab:    -Not discussed today.     5. Psychology:   -No current needs.      6. Disposition:  The patient will follow up in our outpatient clinic in 12 weeks or earlier if clinically indicated.     Assessment will be ongoing with changes in treatment as indicated.  Benefits/risks/alternatives to treatment have been reviewed and the patient has been instructed to contact this office if they have any questions or concerns.  This plan of care has been discussed with the patient and the patient is in agreement.     Faiza Martinez MD, PHD

## 2019-10-31 NOTE — PROGRESS NOTES
[facility] for Comprehensive Chronic Pain Management : Progress Note    Date of visit: 10/31/2019    Interval history:  Ada Welch is a 48 year old female  is known to me for chronic low back pain. The patient has very complex history of low back pain for ~24 years for which she underwent L4-S1 fusion (TLIF) 12/9/13.  Postoperatively, her back pain worsened (mainly LBP, but also radiated down both lower limbs in an S1 dermatomal distribution).   Underwent removal of posterior instrumentation (screws and guadalupe).  After fusion removal, her pain continued.  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  She even sought care for her EDS by a specialist in [facility] who she saw twice. The patient has been seeing a neurologist Dr. Noemy Nguyen for chronic migraines.  She has tried Aimovig with limited benefit. Patient then switched Emgality.     She has a h/o significant autonomic dysfunction.  Since 2014, she reports that her heart rate has fluctuated from as low as  beats per minutes and her blood pressure also fluctuates at the same time without any aggravating factors.  The fluctuation of the blood pressure and heart rate happens when she is supine, sitting, standing, walking, etc.  She had 2 episodes of blacking out for a few seconds.  She has seen cardiologist Dr. Russell who advised her to continue fludrocortisone, isometric exercise, low carbohydrate and low gluten diet.     She has been seeing Dr. Singh for urinary urgency and incontinence.  She is advised for improving lifestyle, dietary modification, optimizing bowel regimen and to start pelvic physical therapy.     Patient reports that her chronic intractable headache pain recently got worse.  Recent work-up never revealed a clear CSF leak however she was diagnosed with spontaneous intracranial hypotension.  She has undergone blood patches intermittently for her headache pain.  She already had the 4 blood  patches: It did help some of her headache pain.    She has been managing her pain with stable dose of Butrans 10 mcg per hour q 7 days, tramadol 50 mg every 6 hours as needed which she refills every 2-3 months.  In addition she takes tizanidine 4 mg 4 times daily as needed.  She takes sumatriptan for breakthrough migraine.  Occasionally she takes oxycodone 5 mg when her pain gets significantly worse.           [place] Prescription Monitoring Program:   Reviewed. No concerns     Review of Systems:  The 14 system ROS was reviewed and was negative except what is documented above and as follows.  Any bowel or bladder problems: none  Mood: okay    Physical Exam:  Vitals:    10/31/19 1015   BP: 135/87   Pulse: 80       General: Awake in no apparent distress.   Eyes: Sclerae are anicteric. PERRLA, EOMI   Neck: supple, no masses.  Collar in place   lungs: unlabored.   Heart: regular rate and rhythm   Abdomen: soft non tender.  Extremities: Pulses are well palpable, no peripheral edema.   Musculoskeletal: 5/5 muscle strength in all extremities.   Neurologic exam:Sensation intact throughout all dermatomes bilateral upper extremities and lower extremities  Psychiatric; Normal affect.   Skin: Warm and Dry.    Medications:  Current Outpatient Medications   Medication Sig Dispense Refill     ACETAMINOPHEN PO Take 1,000 mg by mouth every 6 hours as needed for pain Take with tramadol       albuterol (PROAIR HFA/PROVENTIL HFA/VENTOLIN HFA) 108 (90 BASE) MCG/ACT Inhaler Inhale 2 puffs into the lungs every 6 hours as needed        buprenorphine (BUTRANS) 10 MCG/HR WK patch Place 1 patch onto the skin every 7 days 4 patch 1     butalbital-acetaminophen-caffeine (FIORICET/ESGIC) -40 MG tablet TAKE ONE TABLET BY MOUTH EVERY 4 HOURS AS NEEDED. MAX 10 PER MONTH. 20 tablet 0     cetirizine (ZYRTEC) 10 MG tablet Take 10 mg by mouth daily        COMPRESSION STOCKINGS 1 each daily 20-30 mmHg Thigh Length measure and fit, color and  style per patient preference. Antolin garcia per need. 3 each 3     DULoxetine (CYMBALTA) 30 MG capsule Take 60mg in the morning and 30mg at bedtime 270 capsule 3     famotidine (PEPCID) 20 MG tablet Take 1 tablet (20 mg) by mouth At Bedtime 90 tablet 3     fluticasone (FLONASE) 50 MCG/ACT spray INSTILL 2 SPRAYS INTO BOTH NOSTRILS DAILY 48 mL 3     levothyroxine (SYNTHROID/LEVOTHROID) 25 MCG tablet Take 1 tablet (25 mcg) by mouth daily 90 tablet 2     medical cannabis (Patient's own supply.  Not a prescription) Take 1 Dose by mouth See Admin Instructions (This is NOT a prescription, and does not certify that the patient has a qualifying medical condition for medical cannabis.  The purpose of this order is  to document that the patient reports taking medical cannabis.)       metoclopramide (REGLAN) 10 MG tablet Take 1 tablet (10 mg) by mouth 4 times daily (before meals and nightly) 40 tablet 3     Multiple Vitamins-Minerals (MULTIVITAMIN PO) Take 1 tablet by mouth daily        omeprazole (PRILOSEC) 40 MG capsule Take 1 capsule (40 mg) by mouth 2 times daily as needed (prn) 180 capsule 2     ondansetron (ZOFRAN-ODT) 4 MG ODT tab Take 1-2 tablets (4-8 mg) by mouth every 12 hours as needed for nausea 60 tablet 1     oxyCODONE (ROXICODONE) 5 MG tablet Take 0.5 tablets (2.5 mg) by mouth every 6 hours as needed for severe pain 15 tablet 0     Probiotic Product (PROBIOTIC DAILY PO) Take 2 packets by mouth every morning        propranolol ER (INDERAL LA) 120 MG 24 hr capsule Take 1 capsule (120 mg) by mouth daily 30 capsule 11     rizatriptan (MAXALT-MLT) 5 MG ODT TAKE 1-2 TABLETS (5-10 MG) BY MOUTH AT ONSET OF HEADACHE FOR MIGRAINE (MAX 30 MG IN 24 HOURS) 18 tablet 11     senna (SENOKOT) 8.6 MG tablet Take 17.2-34.4 mg by mouth       SUMAtriptan (IMITREX STATDOSE) 6 MG/0.5ML pen injector kit Inject 0.5 ml (6 mg) subcutaneously at onset of migraine, May repeat in 1 hour , Max 12 mg/24 hrs 2 kit 11     tiZANidine (ZANAFLEX) 4  MG tablet Take 1 tablet (4 mg) by mouth 4 times daily as needed for muscle spasms 360 tablet 3     traMADol (ULTRAM) 50 MG tablet Take 1 tablet (50 mg) by mouth every 6 hours as needed for severe pain 60 tablet 0     vitamin B complex with vitamin C (VITAMIN  B COMPLEX) TABS tablet Take 1 tablet by mouth daily       VITAMIN D, CHOLECALCIFEROL, PO Take 2,000 Units by mouth daily         Analgesic Medications:   Medications related to Pain Management (From now, onward)    None             LABORATORY VALUES:   Recent Labs   Lab Test 03/03/19  1904 12/17/18  0920    138   POTASSIUM 3.4 4.2   CHLORIDE 102 107   CO2 25 21   ANIONGAP 10 10   GLC 95 124*   BUN 9 7   CR 0.82 0.71   JUAN 8.6 8.9       CBC RESULTS:   Recent Labs   Lab Test 03/03/19  1904   WBC 8.9   RBC 4.91   HGB 13.7   HCT 41.6   MCV 85   MCH 27.9   MCHC 32.9   RDW 13.7          Most Recent 3 INR's:  Recent Labs   Lab Test 02/28/19  1045 12/17/18  0920 07/06/18  2229   INR 0.97 0.90 0.96           ASSESSMENT:    Chronic pain syndrome  Failed back surgery syndrome  Intractable migraine  Tom-Danlos syndrome  Dysautonomia  Gastroparesis  Urinary urgency and incontinence  Chronic, continuous use of opioid  Intractable chronic migraine without aura and without status migrainosus  Spontaneous intracranial hypotension status post epidural blood patch x4         PLAN:    1. Medications.   -Continue transdermal Butrans 10 mcg per hour q 7 days. She has #3 patches remaining at home. 3 refills provided today, with post dated fill date 3/9/19.  -Continue propranolol 120 mg p.o. daily  -Continued Zanaflex 4 mg 4 times daily as needed  -Continue tramadol 50 mg p.o. every 6 hours as needed.  Post dated prescription of 60 tablets given today with fill date 3/7/19 and 1 refill.  -Amitriptyline 50 mg nightly  -Cymbalta continued 60 mg AM and 30 mg PM  -Maxalt 5-10 mg p.o. at onset of the headache for migraine (max 30 mg in 24 hours)        2. Interventional  procedures:  The patient had post dural puncture headache after epidural blood patch performed at an outside facility.  Recommend to perform epidural blood patch under fluoroscopic guidance.  If the patient decides to perform epidural blood patch for benign intracranial hypertension, I recommend that she is placed in a Trendelenburg position for 30 minutes after the procedure.  Then she will need to be placed in the prone position for another 30 minutes.    3. Labs and imaging:   -None needed for pain management.      4. Rehab:    -Not discussed today.     5. Psychology:   -No current needs.      6. Disposition:  The patient will follow up in our outpatient clinic in 12 weeks or earlier if clinically indicated.       Assessment will be ongoing with changes in treatment as indicated.  Benefits/risks/alternatives to treatment have been reviewed and the patient has been instructed to contact this office if they have any questions or concerns.  This plan of care has been discussed with the patient and the patient is in agreement.     Faiza Martinez MD, PHD    Answers for HPI/ROS submitted by the patient on 10/31/2019   SAMANTHA 7 TOTAL SCORE: 0

## 2019-11-01 ENCOUNTER — HOSPITAL ENCOUNTER (OUTPATIENT)
Dept: PHYSICAL THERAPY | Facility: CLINIC | Age: 49
Setting detail: THERAPIES SERIES
End: 2019-11-01
Attending: INTERNAL MEDICINE
Payer: COMMERCIAL

## 2019-11-01 DIAGNOSIS — M54.2 CERVICALGIA: ICD-10-CM

## 2019-11-01 DIAGNOSIS — Q79.60 EHLERS-DANLOS SYNDROME: ICD-10-CM

## 2019-11-01 DIAGNOSIS — M54.40 LOW BACK PAIN OF MULTIPLE SITES OF SPINE WITH SCIATICA: Primary | ICD-10-CM

## 2019-11-01 PROCEDURE — 97162 PT EVAL MOD COMPLEX 30 MIN: CPT | Mod: GP | Performed by: PHYSICAL THERAPIST

## 2019-11-01 PROCEDURE — 97110 THERAPEUTIC EXERCISES: CPT | Mod: GP | Performed by: PHYSICAL THERAPIST

## 2019-11-01 PROCEDURE — 97140 MANUAL THERAPY 1/> REGIONS: CPT | Mod: GP | Performed by: PHYSICAL THERAPIST

## 2019-11-01 NOTE — PROGRESS NOTES
11/01/19 0900   Quick Adds   Quick Adds Certification   Type of Visit Initial OP PT Evaluation   General Information   Start of Care Date 11/01/19   Referring Physician Ellyn Rivera   Orders Evaluate and Treat as Indicated   Order Date 09/25/19   Medical Diagnosis Tom-Danlos syndrome    Onset of illness/injury or Date of Surgery 09/25/19   Surgical/Medical history reviewed Yes   Pertinent history of current problem (include personal factors and/or comorbidities that impact the POC) Tiff presents with worsening neck/back pain, HAs. Complex medical history significant for including Tom-Danlos syndrome, chiari malformation, chronic pain, and cervicalgia, lumbar fusion L4-S1. The pt reports that she has had difficulty with CSpine instability. Feels that she is having more degeneration. She is having pain into her head and mid thoracic spine. She was previously working with a PT in [place] and was having success with cupping, graston, cranial sacral, and dry needing however now is unable to see a similar PT due to recently adding medicare. Has an exercise program consisting of isometrics, gentle core stnreghtening and walking. Has done pool therapy in the past with success.   Prior level of function comment Walks 3000 steps, up/down stairs 5 times/day.   Patient role/Employment history Homemaker;Disabled  (Formerly a , )   Patient/Family Goals Statement Reduce pain   Fall Risk Screen   Fall screen completed by PT   Have you fallen 2 or more times in the past year? No   Have you fallen and had an injury in the past year? No   Pain   Pain comments Rates pain 7/10, baseline. Uses deep breathing to manage   Cognitive Status Examination   Orientation orientation to person, place and time   Integumentary   Integumentary No deficits were identified   Posture   Posture Forward head position;Protracted shoulders   Range of Motion (ROM)   ROM Comment C spine limited about 50% bilaterally    Strength   Strength Comments Not formally assessed due to pain, demonstrates mild fucntional weakness with using UEs to transfer as well as core weakness with posture/stability   Bed Mobility   Bed Mobility Comments Independent   Transfer Skills   Transfer Comments Uses UEs from chair   Gait   Gait Comments Ambulates with slightly slower pattern, no significant sings of instability   Sensory Examination   Sensory Perception Comments occasional shooting pains/nerve pains   Muscle Tone   Muscle Tone Comments significant muscle tone/tightness through cervical and thoracic musculature, specifically suboccipitals, upper trap, levator scapulae   Modality Interventions   Planned Modality Interventions Comments as needed   Planned Therapy Interventions   Planned Therapy Interventions strengthening;manual therapy;stretching   Clinical Impression   Criteria for Skilled Therapeutic Interventions Met yes, treatment indicated   PT Diagnosis Pain and impaired strength and mobility   Influenced by the following impairments weakness, chronic pain, head aches, posture   Functional limitations due to impairments needing to rest frequently throughout the day, difficulty with stairs   Clinical Presentation Evolving/Changing   Clinical Presentation Rationale fluctuating pain levels, worsening spine condition   Clinical Decision Making (Complexity) Moderate complexity   Therapy Frequency 2 times/Week   Predicted Duration of Therapy Intervention (days/wks) 90 days   Risk & Benefits of therapy have been explained Yes   Patient, Family & other staff in agreement with plan of care Yes   Clinical Impression Comments The pt would benefit initially from OP clinic based rehab focusing on core strengthening and STM to manage pain but then recommend episode of pool therapy to assist with pain management and strengthening and assisting with regular execises. Will request order Pool therapy from primary MD   Education Assessment   Preferred  Learning Style Listening   Barriers to Learning No barriers   GOALS   PT Eval Goals 1;2;3   Goal 1   Goal Identifier Pain levels   Goal Description The pt will reduce average pain level to 5/10, improving ability to complete daily tasks with less pain and fatigue   Target Date 01/29/20   Goal 2   Goal Identifier Aquatic HEP   Goal Description The pt will be independent with a land and aquatic HEP focusing on improving strength and ROM while protecting joints, improving ability for pt to independently manage pain and fitness levels   Target Date 01/29/20   Goal 3   Goal Identifier JOSEFA   Goal Description The pt will reduce score on JOSEFA by 15%, indicating an improvement in overall QOL   Target Date 01/29/20   Total Evaluation Time   PT Eval, Moderate Complexity Minutes (00249) 20   Therapy Certification   Certification date from 11/01/19   Certification date to 01/29/20   Medical Diagnosis Tom-Danlos syndrome

## 2019-11-01 NOTE — PROGRESS NOTES
[facility]        OUTPATIENT PHYSICAL THERAPY FUNCTIONAL EVALUATION  PLAN OF TREATMENT FOR OUTPATIENT REHABILITATION  (COMPLETE FOR INITIAL CLAIMS ONLY)  Patient's Last Name, First Name, M.I.  YOB: 1970  Ada Welch     Provider's Name   [facility]   Medical Record No.  [id number]     Start of Care Date:  11/01/19   Onset Date:  09/25/19   Type:     _X__PT   ____OT  ____SLP Medical Diagnosis:  Tom-Danlos syndrome      PT Diagnosis:  Pain and impaired strength and mobility Visits from SOC:  1                              __________________________________________________________________________________  Plan of Treatment/Functional Goals:  strengthening, manual therapy, stretching           GOALS  Pain levels  The pt will reduce average pain level to 5/10, improving ability to complete daily tasks with less pain and fatigue  01/29/20    Aquatic HEP  The pt will be independent with a land and aquatic HEP focusing on improving strength and ROM while protecting joints, improving ability for pt to independently manage pain and fitness levels  01/29/20    JOSEFA  The pt will reduce score on JOSEFA by 15%, indicating an improvement in overall QOL  01/29/20                                                           Therapy Frequency:  2 times/Week   Predicted Duration of Therapy Intervention:  90 days    Anamaria Acevedo, PT                                    I CERTIFY THE NEED FOR THESE SERVICES FURNISHED UNDER        THIS PLAN OF TREATMENT AND WHILE UNDER MY CARE     (Physician co-signature of this document indicates review and certification of the therapy plan).                Certification Date From:  11/01/19   Certification Date To:  01/29/20    Referring Provider:  Ellyn Rivera    Initial Assessment  See Epic Evaluation- Start of Care Date:  11/01/19

## 2019-11-02 ENCOUNTER — ANCILLARY PROCEDURE (OUTPATIENT)
Dept: MRI IMAGING | Facility: CLINIC | Age: 49
End: 2019-11-02
Attending: PSYCHIATRY & NEUROLOGY
Payer: COMMERCIAL

## 2019-11-02 DIAGNOSIS — M48.02 CERVICAL STENOSIS OF SPINAL CANAL: ICD-10-CM

## 2019-11-02 DIAGNOSIS — G96.00 CEREBROSPINAL FLUID LEAK: ICD-10-CM

## 2019-11-02 ASSESSMENT — ANXIETY QUESTIONNAIRES: GAD7 TOTAL SCORE: 0

## 2019-11-04 ENCOUNTER — MYC MEDICAL ADVICE (OUTPATIENT)
Dept: NEUROLOGY | Facility: CLINIC | Age: 49
End: 2019-11-04

## 2019-11-04 DIAGNOSIS — M48.061 SPINAL STENOSIS OF LUMBAR REGION, UNSPECIFIED WHETHER NEUROGENIC CLAUDICATION PRESENT: Primary | ICD-10-CM

## 2019-11-04 RX ORDER — TRAMADOL HYDROCHLORIDE 50 MG/1
50 TABLET ORAL EVERY 6 HOURS PRN
Qty: 60 TABLET | Refills: 0 | Status: SHIPPED | OUTPATIENT
Start: 2019-11-04 | End: 2019-11-22

## 2019-11-04 NOTE — TELEPHONE ENCOUNTER
Verbal orders given by Dr. Martinez to refill pt's tramadol for 60 tablets.  Pt's  checked and there are no notable concerns.     Jessica De Souza, RN, BSN

## 2019-11-13 NOTE — PROGRESS NOTES
THORACIC SURGERY - FOLLOW UP OFFICE VISIT      Dear Dr. Rivera, Dr. Ruvalcaba,    I saw Ms. Welch in follow up for the evaluation and treatment of a hiatal hernia and dysphagia.     HPI  Ms. Ada Welch is a 48 year old year-old female with history of Tom Danlos syndrome, eosinophilic esophagitis, GERD, nasal polyps, IBS, chronic migraines, chronic pain syndrome who presents with issues with dysphagia and reflux. The patient has long standing history of refulx disease and is on omeprazole 40 mg BID. She presented to the ED multiple with impacted fluid  She underwent an EGD that showed an impacted fluid bolus and a hiatal hernia.  Ada reports that her symptoms have been going on for years.  Although her reflux symptoms have improved since she started omeprazole, her dysphagia has not gotten better.  She has been taking PPI therapy for 3 years and was recently increased from 20 mg twice daily to 40 mg twice daily.    Previsit Tests   UGI 10/17/2019: Hiatal hernia noted.       EGD 12/17/2018: Impacted food bolus. Non obstructing ring at the GE junction.       CT Chest 4/26/2018     Pathology from esophageal biopsies 10/22   Biopsies from distal esophagus shows no evidence of malignancy or eosinophilic esophagitis.      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     Chronic sinusitis 00/2007    Diagnosed with chronic pansinusitis 2016     Chronic tonsillitis 1980    Had tonsils removed 02/1981, rheumatic fever     Tom-Danlos disease      Eosinophilic esophagitis 08/2018     Gastroesophageal reflux disease 3/1/2017    I have large hiatal hernia     Hernia, abdominal 10/2016    Hiatal Hernia     Hiatal hernia 2016    Have adeline hiatel hernia      Hoarseness Unsure    It comes and goes     IBS (irritable bowel syndrome) with constipation     improved on Linzess     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     Migraines 1/1/2007    Unsure of exact date     Nasal polyps 2013    Were revoved 03/2017, benign     Other nervous system complications 1/2014    Autonomic nervous system disorder, neuralgia, neuropathy     Pneumonia Unsure    Have had pneumonia several times     Swelling, mass, or lump in head and neck 08/2016    Lymph node has been growing for last year     Thyroid disease 01/01/2008        PSH:  Past Surgical History:   Procedure Laterality Date     ADENOIDECTOMY  1981     AS REPAIR OF NASAL SEPTUM       BACK SURGERY  12/09/2013  10/01/2014    Spinal fusion L4-S1, L5 moving 5/8ths in. Remove screws/rods     BIOPSY  01/01/2008 & 3/2017    mole biopsy, lymph node biopsy     COLONOSCOPY  3/2017    Colonoscopy and GI     ESOPHAGEAL IMPEDENCE FUNCTION TEST WITH 24 HOUR PH GREATER THAN 1 HOUR N/A 9/17/2019    Procedure: IMPEDANCE PH STUDY, ESOPHAGUS, 24 HOUR;  Surgeon: Raghavendra Grande MD;  Location:  GI     ESOPHAGOSCOPY, GASTROSCOPY, DUODENOSCOPY (EGD), COMBINED N/A 8/3/2018    Procedure: COMBINED ESOPHAGOSCOPY, GASTROSCOPY, DUODENOSCOPY (EGD), BIOPSY SINGLE OR MULTIPLE;;  Surgeon: Juan Woodson MD;  Location:  GI     ESOPHAGOSCOPY, GASTROSCOPY, DUODENOSCOPY (EGD), COMBINED N/A 10/22/2018    Procedure: COMBINED ESOPHAGOSCOPY, GASTROSCOPY, DUODENOSCOPY (EGD), BIOPSY SINGLE OR MULTIPLE;  Surgeon: Sadia Carolina MD;  Location:  GI     ESOPHAGOSCOPY, GASTROSCOPY, DUODENOSCOPY (EGD), COMBINED N/A 12/17/2018    Procedure: COMBINED ESOPHAGOSCOPY, GASTROSCOPY, DUODENOSCOPY (EGD);  Surgeon: Juan Woodson MD;  Location:  GI     NASAL/SINUS POLYPECTOMY  2017    Polyps were benign     TONSILLECTOMY       TONSILLECTOMY  1981     TUBAL LIGATION          ETOH Occasional   TOB Former smoker. Quit 5 years ago. On and off  for ~20 years.      Physical examination  BMI 35.3    From a personal perspective, lives in [place], [place] with her  and two kids. She is on disability. Patient reports that she is not very active due to her dysautonomia.     IMPRESSION (K21.9) Gastroesophageal reflux disease, esophagitis presence not specified  (primary encounter diagnosis)  .   48 year old female with Tom-Danlos presenting to clinic with hiatal hernia and reflux.     PLAN  I spent a total of 30 minutes with Ms. Welch , more than 50% of which were spent in counseling, coordination of care, and face-to-face time. I reviewed the plan as follows:  I again  had a long discussion with the patient regarding symptoms, which seemed to be multifactorial.  We discussed again that the hernia repair would likely help some of her symptoms.  At this time she is still not willing to commit to surgery. She is worried about her poor healing and the risks associated with surgery.  Also, she is seeing a cardiologist to consider a pacemaker for her dysautonomia.  She will rethink surgery after cardiology optimization and after talking to GI    Procedure planned: Laparoscopic hiatal hernia repair with possible gastrostomy tube without fundoplication.  I reviewed the indications, risks, and benefits of the procedure with Ms. Ada Welch .  We discussed the risks that include but are not limited to bleeding, infection, need for reoperation, conversion to laparotomy, potential long-term failure, and death. I explained the anticipated hospital course (2-5 days) and postoperative recovery, transient dysphagia, transient diarrhea, and permanent postprandial bloating which can be mitigated with proper dietary habits. We discussed the importance of lifetime awareness to limit lifting heavy weights.  Consent: pending  Will discuss in GI Conference  Continue with previously planned cardiology appointment. Intervention planned and recommended by cardiology   All  questions were answered and Ada Welch and present family were in agreement with the plan.  I appreciate the opportunity to participate in the care of your patient and will keep you updated.  Sincerely,

## 2019-11-14 ENCOUNTER — OFFICE VISIT (OUTPATIENT)
Dept: SURGERY | Facility: CLINIC | Age: 49
End: 2019-11-14
Attending: INTERNAL MEDICINE
Payer: COMMERCIAL

## 2019-11-14 ENCOUNTER — OFFICE VISIT (OUTPATIENT)
Dept: CARDIOLOGY | Facility: CLINIC | Age: 49
End: 2019-11-14
Attending: INTERNAL MEDICINE
Payer: COMMERCIAL

## 2019-11-14 VITALS
HEART RATE: 72 BPM | SYSTOLIC BLOOD PRESSURE: 125 MMHG | HEIGHT: 67 IN | OXYGEN SATURATION: 97 % | BODY MASS INDEX: 30.76 KG/M2 | DIASTOLIC BLOOD PRESSURE: 87 MMHG | WEIGHT: 196 LBS

## 2019-11-14 VITALS
BODY MASS INDEX: 30.45 KG/M2 | TEMPERATURE: 98 F | DIASTOLIC BLOOD PRESSURE: 82 MMHG | OXYGEN SATURATION: 97 % | SYSTOLIC BLOOD PRESSURE: 119 MMHG | WEIGHT: 194 LBS | HEIGHT: 67 IN | HEART RATE: 69 BPM | RESPIRATION RATE: 14 BRPM

## 2019-11-14 DIAGNOSIS — K44.9 HIATAL HERNIA: Primary | ICD-10-CM

## 2019-11-14 DIAGNOSIS — G90.9 AUTONOMIC DYSFUNCTION: Primary | ICD-10-CM

## 2019-11-14 DIAGNOSIS — G90.A POTS (POSTURAL ORTHOSTATIC TACHYCARDIA SYNDROME): ICD-10-CM

## 2019-11-14 PROCEDURE — G0463 HOSPITAL OUTPT CLINIC VISIT: HCPCS | Mod: ZF

## 2019-11-14 PROCEDURE — 99214 OFFICE O/P EST MOD 30 MIN: CPT | Mod: GC | Performed by: INTERNAL MEDICINE

## 2019-11-14 RX ORDER — FLUDROCORTISONE ACETATE 0.1 MG/1
TABLET ORAL
Refills: 11 | COMMUNITY
Start: 2019-10-28 | End: 2020-02-07

## 2019-11-14 RX ORDER — EPINEPHRINE 0.3 MG/.3ML
INJECTION SUBCUTANEOUS
Refills: 1 | COMMUNITY
Start: 2019-09-25 | End: 2023-09-27

## 2019-11-14 RX ORDER — MIDODRINE HYDROCHLORIDE 10 MG/1
TABLET ORAL
Refills: 11 | COMMUNITY
Start: 2019-10-28 | End: 2020-02-07

## 2019-11-14 RX ORDER — IVABRADINE 7.5 MG/1
7.5 TABLET, FILM COATED ORAL 2 TIMES DAILY WITH MEALS
Qty: 60 TABLET | Refills: 3 | Status: SHIPPED | OUTPATIENT
Start: 2019-11-14 | End: 2020-03-23

## 2019-11-14 ASSESSMENT — ENCOUNTER SYMPTOMS
RECTAL PAIN: 0
COUGH DISTURBING SLEEP: 0
SNORES LOUDLY: 1
BLOOD IN STOOL: 0
WHEEZING: 1
NECK MASS: 1
SYNCOPE: 0
WEAKNESS: 1
BACK PAIN: 1
MEMORY LOSS: 1
HYPOTENSION: 1
BRUISES/BLEEDS EASILY: 1
TROUBLE SWALLOWING: 1
HYPOTENSION: 1
POSTURAL DYSPNEA: 1
VOMITING: 0
MYALGIAS: 1
JOINT SWELLING: 1
FEVER: 0
DOUBLE VISION: 1
SKIN CHANGES: 0
JOINT SWELLING: 1
DIZZINESS: 1
EYE WATERING: 1
SPUTUM PRODUCTION: 1
BLOOD IN STOOL: 0
POLYPHAGIA: 0
MUSCLE CRAMPS: 1
WHEEZING: 1
HOARSE VOICE: 1
INCREASED ENERGY: 1
EYE WATERING: 1
INCREASED ENERGY: 1
POOR WOUND HEALING: 1
DYSPNEA ON EXERTION: 1
BLOATING: 1
FLANK PAIN: 1
HEMOPTYSIS: 0
LIGHT-HEADEDNESS: 1
DECREASED APPETITE: 1
SNORES LOUDLY: 1
LIGHT-HEADEDNESS: 1
NUMBNESS: 1
MEMORY LOSS: 1
TROUBLE SWALLOWING: 1
DECREASED APPETITE: 1
FLANK PAIN: 1
DYSURIA: 1
HALLUCINATIONS: 0
SINUS PAIN: 1
DOUBLE VISION: 1
NUMBNESS: 1
SKIN CHANGES: 0
ALTERED TEMPERATURE REGULATION: 1
SYNCOPE: 0
PALPITATIONS: 1
MYALGIAS: 1
ABDOMINAL PAIN: 1
RECTAL PAIN: 0
DIZZINESS: 1
SPUTUM PRODUCTION: 1
FATIGUE: 1
DISTURBANCES IN COORDINATION: 1
TASTE DISTURBANCE: 1
SMELL DISTURBANCE: 1
HOARSE VOICE: 1
SORE THROAT: 1
COUGH DISTURBING SLEEP: 0
BOWEL INCONTINENCE: 0
TINGLING: 1
HEMATURIA: 0
JAUNDICE: 0
SWOLLEN GLANDS: 1
DYSPNEA ON EXERTION: 1
WEIGHT LOSS: 1
LEG PAIN: 1
DIARRHEA: 1
POOR WOUND HEALING: 1
DISTURBANCES IN COORDINATION: 1
NIGHT SWEATS: 1
ALTERED TEMPERATURE REGULATION: 1
HEMATURIA: 0
NAIL CHANGES: 1
MUSCLE WEAKNESS: 1
HEARTBURN: 1
ORTHOPNEA: 1
HALLUCINATIONS: 0
BLOATING: 1
SINUS CONGESTION: 1
STIFFNESS: 1
NAUSEA: 1
POLYDIPSIA: 0
SHORTNESS OF BREATH: 1
VOMITING: 0
MUSCLE CRAMPS: 1
CONSTIPATION: 1
ABDOMINAL PAIN: 1
SHORTNESS OF BREATH: 1
ARTHRALGIAS: 1
JAUNDICE: 0
ARTHRALGIAS: 1
SWOLLEN GLANDS: 1
EYE PAIN: 0
LOSS OF CONSCIOUSNESS: 1
SPEECH CHANGE: 1
NAIL CHANGES: 1
TREMORS: 1
PARALYSIS: 0
SEIZURES: 0
TASTE DISTURBANCE: 1
HEMOPTYSIS: 0
BOWEL INCONTINENCE: 0
PARALYSIS: 0
HYPERTENSION: 0
EYE REDNESS: 1
CHILLS: 1
POSTURAL DYSPNEA: 1
EYE IRRITATION: 1
SORE THROAT: 1
CONSTIPATION: 1
HEADACHES: 1
COUGH: 1
EYE PAIN: 0
TREMORS: 1
WEIGHT GAIN: 0
NECK PAIN: 1
TINGLING: 1
NAUSEA: 1
SLEEP DISTURBANCES DUE TO BREATHING: 0
EYE IRRITATION: 1
EXERCISE INTOLERANCE: 1
SEIZURES: 0
HYPERTENSION: 0
BACK PAIN: 1
SPEECH CHANGE: 1
EYE REDNESS: 1
HEADACHES: 1
NECK MASS: 1
ORTHOPNEA: 1
DIFFICULTY URINATING: 1
SLEEP DISTURBANCES DUE TO BREATHING: 0
COUGH: 1
WEIGHT LOSS: 1
MUSCLE WEAKNESS: 1
NIGHT SWEATS: 1
POLYDIPSIA: 0
LEG PAIN: 1
DIARRHEA: 1
LOSS OF CONSCIOUSNESS: 1
SINUS CONGESTION: 1
FEVER: 0
EXERCISE INTOLERANCE: 1
DYSURIA: 1
FATIGUE: 1
NECK PAIN: 1
DIFFICULTY URINATING: 1
SINUS PAIN: 1
SMELL DISTURBANCE: 1
WEIGHT GAIN: 0
POLYPHAGIA: 0
PALPITATIONS: 1
BRUISES/BLEEDS EASILY: 1
CHILLS: 1
WEAKNESS: 1
STIFFNESS: 1
HEARTBURN: 1

## 2019-11-14 ASSESSMENT — MIFFLIN-ST. JEOR
SCORE: 1547.73
SCORE: 1556.68

## 2019-11-14 ASSESSMENT — PAIN SCALES - GENERAL
PAINLEVEL: NO PAIN (0)
PAINLEVEL: SEVERE PAIN (7)

## 2019-11-14 NOTE — PATIENT INSTRUCTIONS
You were seen in the Electrophysiology Clinic today by: Dr. Russell    Plan:     Medication Changes:     Start Ivabradine 7.5mg BID    Follow up visit:     6 months with Dr. Russell    Further Instructions:    Call us at [phone] (option 1 and option 4) to let us know how things go and if you are interested in starting fludrocortisone 0.1mg daily.      Your Care Team:  [facility]   Telephone Number     Jeri Bravo RN [phone]     For scheduling appts or procedures:    Arlyn Amezcua   [phone]   For the Device Clinic (Pacemakers, ICDs, Loop Recorders)    During business hours: [phone]  After business hours:   [phone]- select option 4 and ask for job code [phone].       Cardiovascular Clinic:   [address] [place] [address]. [place] [address]      As always, Thank you for trusting us with your health care needs!

## 2019-11-14 NOTE — NURSING NOTE
Chief Complaint   Patient presents with     Follow Up     return arrhythmia      Vitals were taken and medications were reconciled.     Alexa Madison  4:11 PM

## 2019-11-14 NOTE — LETTER
11/14/2019       RE: Ada Welch  [address]  [place] [address]     Dear Colleague,    Thank you for referring your patient, Ada Welch, to the [facility]. Please see a copy of my visit note below.    THORACIC SURGERY - FOLLOW UP OFFICE VISIT      Dear Dr. Rivera, Dr. Ruvalcaba,    I saw Ms. Welch in follow up for the evaluation and treatment of a hiatal hernia and dysphagia.     HPI  Ms. Ada Welch is a 48 year old year-old female with history of Tom Danlos syndrome, eosinophilic esophagitis, GERD, nasal polyps, IBS, chronic migraines, chronic pain syndrome who presents with issues with dysphagia and reflux. The patient has long standing history of refulx disease and is on omeprazole 40 mg BID. She presented to the ED multiple with impacted fluid  She underwent an EGD that showed an impacted fluid bolus and a hiatal hernia.  Ada reports that her symptoms have been going on for years.  Although her reflux symptoms have improved since she started omeprazole, her dysphagia has not gotten better.  She has been taking PPI therapy for 3 years and was recently increased from 20 mg twice daily to 40 mg twice daily.    Previsit Tests   UGI 10/17/2019: Hiatal hernia noted.       EGD 12/17/2018: Impacted food bolus. Non obstructing ring at the GE junction.       CT Chest 4/26/2018     Pathology from esophageal biopsies 10/22   Biopsies from distal esophagus shows no evidence of malignancy or eosinophilic esophagitis.      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     Chronic sinusitis 00/2007    Diagnosed with chronic pansinusitis 2016     Chronic tonsillitis 1980    Had tonsils removed 02/1981, rheumatic fever      Tom-Danlos disease      Eosinophilic esophagitis 08/2018     Gastroesophageal reflux disease 3/1/2017    I have large hiatal hernia     Hernia, abdominal 10/2016    Hiatal Hernia     Hiatal hernia 2016    Have adeline hiatel hernia     Hoarseness Unsure    It comes and goes     IBS (irritable bowel syndrome) with constipation     improved on Linzess     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     Migraines 1/1/2007    Unsure of exact date     Nasal polyps 2013    Were revoved 03/2017, benign     Other nervous system complications 1/2014    Autonomic nervous system disorder, neuralgia, neuropathy     Pneumonia Unsure    Have had pneumonia several times     Swelling, mass, or lump in head and neck 08/2016    Lymph node has been growing for last year     Thyroid disease 01/01/2008        PSH:  Past Surgical History:   Procedure Laterality Date     ADENOIDECTOMY  1981     AS REPAIR OF NASAL SEPTUM       BACK SURGERY  12/09/2013  10/01/2014    Spinal fusion L4-S1, L5 moving 5/8ths in. Remove screws/rods     BIOPSY  01/01/2008 & 3/2017    mole biopsy, lymph node biopsy     COLONOSCOPY  3/2017    Colonoscopy and GI     ESOPHAGEAL IMPEDENCE FUNCTION TEST WITH 24 HOUR PH GREATER THAN 1 HOUR N/A 9/17/2019    Procedure: IMPEDANCE PH STUDY, ESOPHAGUS, 24 HOUR;  Surgeon: Raghavendra Grande MD;  Location:  GI     ESOPHAGOSCOPY, GASTROSCOPY, DUODENOSCOPY (EGD), COMBINED N/A 8/3/2018    Procedure: COMBINED ESOPHAGOSCOPY, GASTROSCOPY, DUODENOSCOPY (EGD), BIOPSY SINGLE OR MULTIPLE;;  Surgeon: Juan Woodson MD;  Location:  GI     ESOPHAGOSCOPY, GASTROSCOPY, DUODENOSCOPY (EGD), COMBINED N/A 10/22/2018    Procedure: COMBINED ESOPHAGOSCOPY, GASTROSCOPY, DUODENOSCOPY (EGD), BIOPSY SINGLE OR MULTIPLE;  Surgeon: Sadia Carolina MD;  Location:  GI     ESOPHAGOSCOPY, GASTROSCOPY, DUODENOSCOPY (EGD), COMBINED N/A 12/17/2018    Procedure: COMBINED ESOPHAGOSCOPY, GASTROSCOPY, DUODENOSCOPY (EGD);   Surgeon: Juan Woodson MD;  Location:  GI     NASAL/SINUS POLYPECTOMY  2017    Polyps were benign     TONSILLECTOMY       TONSILLECTOMY  1981     TUBAL LIGATION          ETOH Occasional   TOB Former smoker. Quit 5 years ago. On and off for ~20 years.      Physical examination  BMI 35.3    From a personal perspective, lives in [place], [place] with her  and two kids. She is on disability. Patient reports that she is not very active due to her dysautonomia.     IMPRESSION (K21.9) Gastroesophageal reflux disease, esophagitis presence not specified  (primary encounter diagnosis)  .   48 year old female with Tom-Danlos presenting to clinic with hiatal hernia and reflux.     PLAN  I spent a total of 30 minutes with Ms. Welch , more than 50% of which were spent in counseling, coordination of care, and face-to-face time. I reviewed the plan as follows:  I again  had a long discussion with the patient regarding symptoms, which seemed to be multifactorial.  We discussed again that the hernia repair would likely help some of her symptoms.  At this time she is still not willing to commit to surgery. She is worried about her poor healing and the risks associated with surgery.  Also, she is seeing a cardiologist to consider a pacemaker for her dysautonomia.  She will rethink surgery after cardiology optimization and after talking to GI    Procedure planned: Laparoscopic hiatal hernia repair with possible gastrostomy tube without fundoplication.  I reviewed the indications, risks, and benefits of the procedure with Ms. Ada Welch .  We discussed the risks that include but are not limited to bleeding, infection, need for reoperation, conversion to laparotomy, potential long-term failure, and death. I explained the anticipated hospital course (2-5 days) and postoperative recovery, transient dysphagia, transient diarrhea, and permanent postprandial bloating which can be mitigated with proper dietary habits. We discussed  the importance of lifetime awareness to limit lifting heavy weights.  Consent: pending  Will discuss in GI Conference  Continue with previously planned cardiology appointment. Intervention planned and recommended by cardiology   All questions were answered and Ada Welch and present family were in agreement with the plan.  I appreciate the opportunity to participate in the care of your patient and will keep you updated.  Sincerely,          Alana Mack MD

## 2019-11-14 NOTE — NURSING NOTE
"Oncology Rooming Note    November 14, 2019 3:24 PM   Ada Welch is a 48 year old female who presents for:    Chief Complaint   Patient presents with     Oncology Clinic Visit     Return Visit Gastroesophageal reflux disease, esophagitis     Initial Vitals: /82 (BP Location: Right arm, Patient Position: Chair, Cuff Size: Adult Regular)   Pulse 69   Temp 98  F (36.7  C) (Oral)   Resp 14   Ht 1.702 m (5' 7.01\")   Wt 88 kg (194 lb)   LMP 11/12/2019 (Exact Date)   SpO2 97%   BMI 30.38 kg/m   Estimated body mass index is 30.38 kg/m  as calculated from the following:    Height as of this encounter: 1.702 m (5' 7.01\").    Weight as of this encounter: 88 kg (194 lb). Body surface area is 2.04 meters squared.  Severe Pain (7) Comment: Data Unavailable   Patient's last menstrual period was 11/12/2019 (exact date).  Allergies reviewed: Yes  Medications reviewed: Yes    Medications: Medication refills not needed today.  Pharmacy name entered into [facility]: [facility] [id number] [place] [address]    Clinical concerns: Patient is wondering what her options & plan of treatment are going forward. Mack was notified.      Nissa Garcia, RN, MSN            "

## 2019-11-14 NOTE — LETTER
"11/14/2019      RE: Ada Welch  [address]  [place] [address]       Dear Colleague,    Thank you for the opportunity to participate in the care of your patient, Ada Welch, at the [facility]. Please see a copy of my visit note below.    HPI: Ms. Scar Welch is a 48 year-old female who presents  for follow up of autonomic dysfuction.  The patient has a past medical history significant for dysautonomia and Tom-Danlos.     Since last visit, sx have been stable. Pt will still have episodes of heart rate \"spiking\" while doing household chores. Pt will also sometimes wake up in the morning with a low HR (in the 40s). When her HR is this low, pt feels very lethargic and sleepy. These sx can last an entire day.     Pt is currently taking propranolol  qday. Not currently taking midodrine or fludrocortisone or ivabradine. She drinks at least 100oz of water, with or without Powerade/Propel powder mix, and some soda daily. She is also currently wearing compression tights. Pt does derive significant benefit from them - she is more symptomatic when she wears looser yoga pants instead.     Pt is also trying her best to keep active. Walks at least 4-5000 steps a day. She will also try to ascend 4-5 flights of stairs a day. Pt is signed up for pool therapy (water exercises) next month.     Back pain is worsening. L4-S1 have been previously fused. Pt is also experiencing new cervical degeneration.     Pt is also dealing with eosinophilic esophagitis and a large hiatal hernia.     Pt has also been seeing pain psychology, learning biofeedback.       PAST MEDICAL HISTORY:  Past Medical History:   Diagnosis Date     Allergic rhinitis 09/2007     Arthritis 11/01/2013    Found during search for cause of back pain     Autoimmune disease (H) 2016    Immunosuppresive     Bleeding disorder (H) 2016    Bruise easily     Blood clotting disorder (H) 2016    " 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     Chronic sinusitis 00/2007    Diagnosed with chronic pansinusitis 2016     Chronic tonsillitis 1980    Had tonsils removed 02/1981, rheumatic fever     Tom-Danlos disease      Eosinophilic esophagitis 08/2018     Gastroesophageal reflux disease 3/1/2017    I have large hiatal hernia     Hernia, abdominal 10/2016    Hiatal Hernia     Hiatal hernia 2016    Have adeline hiatel hernia     Hoarseness Unsure    It comes and goes     IBS (irritable bowel syndrome) with constipation     improved on Linzess     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     Migraines 1/1/2007    Unsure of exact date     Nasal polyps 2013    Were revoved 03/2017, benign     Other nervous system complications 1/2014    Autonomic nervous system disorder, neuralgia, neuropathy     Pneumonia Unsure    Have had pneumonia several times     Swelling, mass, or lump in head and neck 08/2016    Lymph node has been growing for last year     Thyroid disease 01/01/2008       CURRENT MEDICATIONS:  Current Outpatient Medications   Medication Sig Dispense Refill     ACETAMINOPHEN PO Take 1,000 mg by mouth every 6 hours as needed for pain Take with tramadol       albuterol (PROAIR HFA/PROVENTIL HFA/VENTOLIN HFA) 108 (90 BASE) MCG/ACT Inhaler Inhale 2 puffs into the lungs every 6 hours as needed        amitriptyline (ELAVIL) 10 MG tablet Take 1 tablet (10 mg) by mouth At Bedtime 60 tablet 1     buprenorphine (BUTRANS) 10 MCG/HR WK patch Place 1 patch onto the skin every 7 days 4 patch 1     butalbital-acetaminophen-caffeine (FIORICET/ESGIC) -40 MG tablet TAKE ONE TABLET BY MOUTH EVERY 4 HOURS AS NEEDED. MAX 10 PER MONTH. 20 tablet 0     cetirizine (ZYRTEC) 10 MG tablet Take 10 mg by mouth daily        COMPRESSION STOCKINGS 1 each daily 20-30 mmHg Thigh Length measure and fit, color and style per patient preference. Doff n jose per need. 3 each 3     DULoxetine (CYMBALTA) 30 MG capsule Take 60mg  in the morning and 30mg at bedtime 270 capsule 3     EPINEPHrine (EPIPEN/ADRENACLICK/OR ANY BX GENERIC EQUIV) 0.3 MG/0.3ML injection 2-pack INJECT 0.3 MLS (0.3 MG) INTO THE MUSCLE AS NEEDED FOR ANAPHYLAXIS  1     famotidine (PEPCID) 20 MG tablet Take 1 tablet (20 mg) by mouth At Bedtime 90 tablet 3     fludrocortisone (FLORINEF) 0.1 MG tablet TAKE 1 TABLET BY MOUTH TWICE A DAY  11     fluticasone (FLONASE) 50 MCG/ACT spray INSTILL 2 SPRAYS INTO BOTH NOSTRILS DAILY 48 mL 3     levothyroxine (SYNTHROID/LEVOTHROID) 25 MCG tablet Take 1 tablet (25 mcg) by mouth daily 90 tablet 2     medical cannabis (Patient's own supply.  Not a prescription) Take 1 Dose by mouth See Admin Instructions (This is NOT a prescription, and does not certify that the patient has a qualifying medical condition for medical cannabis.  The purpose of this order is  to document that the patient reports taking medical cannabis.)       metoclopramide (REGLAN) 10 MG tablet Take 1 tablet (10 mg) by mouth 4 times daily (before meals and nightly) 40 tablet 3     midodrine (PROAMATINE) 10 MG tablet TAKE 1 TABLET BY MOUTH THREE TIMES A DAY  11     Multiple Vitamins-Minerals (MULTIVITAMIN PO) Take 1 tablet by mouth daily        omeprazole (PRILOSEC) 40 MG capsule Take 1 capsule (40 mg) by mouth 2 times daily as needed (prn) 180 capsule 2     ondansetron (ZOFRAN-ODT) 4 MG ODT tab Take 1-2 tablets (4-8 mg) by mouth every 12 hours as needed for nausea 60 tablet 1     oxyCODONE (ROXICODONE) 5 MG tablet Take 0.5 tablets (2.5 mg) by mouth every 6 hours as needed for severe pain 15 tablet 0     Probiotic Product (PROBIOTIC DAILY PO) Take 2 packets by mouth every morning        propranolol ER (INDERAL LA) 120 MG 24 hr capsule Take 1 capsule (120 mg) by mouth daily 30 capsule 11     rizatriptan (MAXALT-MLT) 5 MG ODT TAKE 1-2 TABLETS (5-10 MG) BY MOUTH AT ONSET OF HEADACHE FOR MIGRAINE (MAX 30 MG IN 24 HOURS) 18 tablet 11     senna (SENOKOT) 8.6 MG tablet Take  17.2-34.4 mg by mouth       SUMAtriptan (IMITREX STATDOSE) 6 MG/0.5ML pen injector kit Inject 0.5 ml (6 mg) subcutaneously at onset of migraine, May repeat in 1 hour , Max 12 mg/24 hrs 2 kit 11     tiZANidine (ZANAFLEX) 4 MG tablet Take 1 tablet (4 mg) by mouth 4 times daily as needed for muscle spasms 360 tablet 3     traMADol (ULTRAM) 50 MG tablet Take 1 tablet (50 mg) by mouth every 6 hours as needed for severe pain 60 tablet 0     vitamin B complex with vitamin C (VITAMIN  B COMPLEX) TABS tablet Take 1 tablet by mouth daily       VITAMIN D, CHOLECALCIFEROL, PO Take 2,000 Units by mouth daily         PAST SURGICAL HISTORY:  Past Surgical History:   Procedure Laterality Date     ADENOIDECTOMY  1981     AS REPAIR OF NASAL SEPTUM       BACK SURGERY  12/09/2013  10/01/2014    Spinal fusion L4-S1, L5 moving 5/8ths in. Remove screws/rods     BIOPSY  01/01/2008 & 3/2017    mole biopsy, lymph node biopsy     COLONOSCOPY  3/2017    Colonoscopy and GI     ESOPHAGEAL IMPEDENCE FUNCTION TEST WITH 24 HOUR PH GREATER THAN 1 HOUR N/A 9/17/2019    Procedure: IMPEDANCE PH STUDY, ESOPHAGUS, 24 HOUR;  Surgeon: Raghavendra Grande MD;  Location:  GI     ESOPHAGOSCOPY, GASTROSCOPY, DUODENOSCOPY (EGD), COMBINED N/A 8/3/2018    Procedure: COMBINED ESOPHAGOSCOPY, GASTROSCOPY, DUODENOSCOPY (EGD), BIOPSY SINGLE OR MULTIPLE;;  Surgeon: Juan Woodson MD;  Location:  GI     ESOPHAGOSCOPY, GASTROSCOPY, DUODENOSCOPY (EGD), COMBINED N/A 10/22/2018    Procedure: COMBINED ESOPHAGOSCOPY, GASTROSCOPY, DUODENOSCOPY (EGD), BIOPSY SINGLE OR MULTIPLE;  Surgeon: Sadia Carolina MD;  Location:  GI     ESOPHAGOSCOPY, GASTROSCOPY, DUODENOSCOPY (EGD), COMBINED N/A 12/17/2018    Procedure: COMBINED ESOPHAGOSCOPY, GASTROSCOPY, DUODENOSCOPY (EGD);  Surgeon: Juan Woodson MD;  Location:  GI     NASAL/SINUS POLYPECTOMY  2017    Polyps were benign     TONSILLECTOMY       TONSILLECTOMY  1981     TUBAL LIGATION         ALLERGIES:     Allergies    Allergen Reactions     Bee Venom Anaphylaxis, Difficulty breathing, Palpitations, Shortness Of Breath and Visual Disturbance     Patient does carry Epi-Pen     No Clinical Screening - See Comments Anaphylaxis     Chicken-Derived Products (Egg) Diarrhea and Nausea     Patient will self monitor  Other reaction(s): Nausea  Patient will self monitor  Other reaction(s): GI intolerance     Compazine [Prochlorperazine] Other (See Comments)     Extreme jittery     Food      Milk     Gabapentin      Gluten Meal Other (See Comments)     Pregabalin Other (See Comments)     Seasonal Allergies Unknown     Dust, mold     Topiramate      Wheat Diarrhea     Wheat Bran Nausea     Patient will self monitor  Other reaction(s): Nausea  Patient will self monitor       FAMILY HISTORY:  Family History   Problem Relation Age of Onset     Connective Tissue Disorder Mother         eds     Connective Tissue Disorder Sister         eds     Cancer Father         Spinal tumor grew into spinal cord, went in brain     Migraines Father      Diabetes Maternal Grandmother         Elderly diabetes     Heart Disease Maternal Grandmother      Hypertension Maternal Grandmother      Diabetes Paternal Grandmother         Elderly diabetes     Diabetes Paternal Grandfather         Elderly diabetes     Asthma Sister         Pediatric Asthma     Migraines Sister      Asthma Son         Pediatric Asthma     Thyroid Disease Sister         ,     Migraines Sister      Migraines Sister      Breast Cancer No family hx of      - Premature coronary artery disease  - Atrial fibrillation  - Sudden cardiac death     SOCIAL HISTORY:  Social History     Tobacco Use     Smoking status: Former Smoker     Packs/day: 0.20     Years: 10.00     Pack years: 2.00     Types: Cigarettes     Start date: 1991     Last attempt to quit: 2013     Years since quittin.9     Smokeless tobacco: Never Used     Tobacco comment: I somked on and off during specified dates.  "  Substance Use Topics     Alcohol use: Yes     Comment: occasional     Drug use: Yes     Types: Marijuana     Comment: medical marijuana       Exam:  /87 (BP Location: Right arm, Patient Position: Chair, Cuff Size: Adult Regular)   Pulse 72   Ht 1.702 m (5' 7\")   Wt 88.9 kg (196 lb)   LMP 11/12/2019 (Exact Date)   SpO2 97%   BMI 30.70 kg/m     GENERAL APPEARANCE: healthy, alert and no distress  HEENT: no icterus, no xanthelasmas, normal pupil size and reaction, normal palate, mucosa moist, no central cyanosis  NECK: no adenopathy, no asymmetry, masses, or scars, thyroid normal to palpation and no bruits, JVP not elevated  RESPIRATORY: lungs clear to auscultation - no rales, rhonchi or wheezes, no use of accessory muscles, no retractions, respirations are unlabored, normal respiratory rate  CARDIOVASCULAR: regular rhythm, normal S1 with physiologic split S2, no S3 or S4 and no murmur, click or rub, precordium quiet with normal PMI.  ABDOMEN: soft, non tender, without hepatosplenomegaly, no masses palpable, bowel sounds normal, aorta not enlarged by palpation, no abdominal bruits  EXTREMITIES: peripheral pulses normal, no edema, no bruits  NEURO: alert and oriented to person/place/time, normal speech, gait and affect  VASC: Radial, femoral, dorsalis pedis and posterior tibialis pulses are normal in volumes and symmetric bilaterally. No bruits are heard.  SKIN: no ecchymoses, no rashes    Labs:  CBC RESULTS:   Lab Results   Component Value Date    WBC 8.9 03/03/2019    RBC 4.91 03/03/2019    HGB 13.7 03/03/2019    HCT 41.6 03/03/2019    MCV 85 03/03/2019    MCH 27.9 03/03/2019    MCHC 32.9 03/03/2019    RDW 13.7 03/03/2019     03/03/2019       BMP RESULTS:  Lab Results   Component Value Date     03/03/2019    POTASSIUM 3.4 03/03/2019    CHLORIDE 102 03/03/2019    CO2 25 03/03/2019    ANIONGAP 10 03/03/2019    GLC 95 03/03/2019    BUN 9 03/03/2019    CR 0.82 03/03/2019    GFRESTIMATED 86 " 03/03/2019    GFRESTBLACK >90 03/03/2019    JUAN 8.6 03/03/2019        INR RESULTS:  Lab Results   Component Value Date    INR 0.97 02/28/2019    INR 0.90 12/17/2018    INR 0.96 07/06/2018    INR 1.01 07/25/2017       Procedures:  PULMONARY FUNCTION TESTS:   No flowsheet data found.      ECHOCARDIOGRAM 8/30/2017:   Left ventricular function, chamber size, wall motion, and wall thickness are  normal.The EF is 60-65%.  Normal right ventricular size and systolic function.  No significant valve disease.    Assessment and Plan:     1.  Autonomic dysfunction - continue propranolol ER 120mg qday. Add ivabradine 7.5mg BID    2.  Orthostatic hypotension - continue current fluid/electrolyte intake and compression pants. Will ask pt to f/u by phone or MyChart to consider also starting fludrocortisone 0.1mg qday.    3.  Chest pain which is most likely musculoskeletal.  Lexiscan was normal.     4.  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  An echo 1 year ago did not reveal any problem. Surveillance every couple years should be appropriate.    5.  Clinic follow-up in 6 months.     Patient was seen and discussed with Dr. Kirk Russell MD.    Daryl Pool MD, PhD  Cardiology Fellow  Click to text page [phone]    I very much appreciated the opportunity to see and assess Ms Ada Welch in the clinic  Today    I agree with the note above which summarizes my findings and current recommendations    Please do not hesitate to contact my office if you have any questions or concerns.      Kirk Russell MD  Cardiac Arrhythmia Service  [facility]  [phone]     [facility]

## 2019-11-14 NOTE — PROGRESS NOTES
"HPI: Ms. Scar Welch is a 48 year-old female who presents  for follow up of autonomic dysfuction.  The patient has a past medical history significant for dysautonomia and Tom-Danlos.     Since last visit, sx have been stable. Pt will still have episodes of heart rate \"spiking\" while doing household chores. Pt will also sometimes wake up in the morning with a low HR (in the 40s). When her HR is this low, pt feels very lethargic and sleepy. These sx can last an entire day.     Pt is currently taking propranolol  qday. Not currently taking midodrine or fludrocortisone or ivabradine. She drinks at least 100oz of water, with or without Powerade/Propel powder mix, and some soda daily. She is also currently wearing compression tights. Pt does derive significant benefit from them - she is more symptomatic when she wears looser yoga pants instead.     Pt is also trying her best to keep active. Walks at least 4-5000 steps a day. She will also try to ascend 4-5 flights of stairs a day. Pt is signed up for pool therapy (water exercises) next month.     Back pain is worsening. L4-S1 have been previously fused. Pt is also experiencing new cervical degeneration.     Pt is also dealing with eosinophilic esophagitis and a large hiatal hernia.     Pt has also been seeing pain psychology, learning biofeedback.       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     Chronic sinusitis 00/2007    Diagnosed with chronic pansinusitis 2016     Chronic tonsillitis 1980    Had tonsils removed 02/1981, rheumatic fever     Tom-Danlos disease      Eosinophilic esophagitis 08/2018     Gastroesophageal reflux disease 3/1/2017    I have large hiatal " hernia     Hernia, abdominal 10/2016    Hiatal Hernia     Hiatal hernia 2016    Have adeline hiatel hernia     Hoarseness Unsure    It comes and goes     IBS (irritable bowel syndrome) with constipation     improved on Linzess     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     Migraines 1/1/2007    Unsure of exact date     Nasal polyps 2013    Were revoved 03/2017, benign     Other nervous system complications 1/2014    Autonomic nervous system disorder, neuralgia, neuropathy     Pneumonia Unsure    Have had pneumonia several times     Swelling, mass, or lump in head and neck 08/2016    Lymph node has been growing for last year     Thyroid disease 01/01/2008       CURRENT MEDICATIONS:  Current Outpatient Medications   Medication Sig Dispense Refill     ACETAMINOPHEN PO Take 1,000 mg by mouth every 6 hours as needed for pain Take with tramadol       albuterol (PROAIR HFA/PROVENTIL HFA/VENTOLIN HFA) 108 (90 BASE) MCG/ACT Inhaler Inhale 2 puffs into the lungs every 6 hours as needed        amitriptyline (ELAVIL) 10 MG tablet Take 1 tablet (10 mg) by mouth At Bedtime 60 tablet 1     buprenorphine (BUTRANS) 10 MCG/HR WK patch Place 1 patch onto the skin every 7 days 4 patch 1     butalbital-acetaminophen-caffeine (FIORICET/ESGIC) -40 MG tablet TAKE ONE TABLET BY MOUTH EVERY 4 HOURS AS NEEDED. MAX 10 PER MONTH. 20 tablet 0     cetirizine (ZYRTEC) 10 MG tablet Take 10 mg by mouth daily        COMPRESSION STOCKINGS 1 each daily 20-30 mmHg Thigh Length measure and fit, color and style per patient preference. Antolin n jose per need. 3 each 3     DULoxetine (CYMBALTA) 30 MG capsule Take 60mg in the morning and 30mg at bedtime 270 capsule 3     EPINEPHrine (EPIPEN/ADRENACLICK/OR ANY BX GENERIC EQUIV) 0.3 MG/0.3ML injection 2-pack INJECT 0.3 MLS (0.3 MG) INTO THE MUSCLE AS NEEDED FOR ANAPHYLAXIS  1     famotidine (PEPCID) 20 MG tablet Take 1 tablet (20 mg) by mouth At Bedtime 90 tablet 3      fludrocortisone (FLORINEF) 0.1 MG tablet TAKE 1 TABLET BY MOUTH TWICE A DAY  11     fluticasone (FLONASE) 50 MCG/ACT spray INSTILL 2 SPRAYS INTO BOTH NOSTRILS DAILY 48 mL 3     levothyroxine (SYNTHROID/LEVOTHROID) 25 MCG tablet Take 1 tablet (25 mcg) by mouth daily 90 tablet 2     medical cannabis (Patient's own supply.  Not a prescription) Take 1 Dose by mouth See Admin Instructions (This is NOT a prescription, and does not certify that the patient has a qualifying medical condition for medical cannabis.  The purpose of this order is  to document that the patient reports taking medical cannabis.)       metoclopramide (REGLAN) 10 MG tablet Take 1 tablet (10 mg) by mouth 4 times daily (before meals and nightly) 40 tablet 3     midodrine (PROAMATINE) 10 MG tablet TAKE 1 TABLET BY MOUTH THREE TIMES A DAY  11     Multiple Vitamins-Minerals (MULTIVITAMIN PO) Take 1 tablet by mouth daily        omeprazole (PRILOSEC) 40 MG capsule Take 1 capsule (40 mg) by mouth 2 times daily as needed (prn) 180 capsule 2     ondansetron (ZOFRAN-ODT) 4 MG ODT tab Take 1-2 tablets (4-8 mg) by mouth every 12 hours as needed for nausea 60 tablet 1     oxyCODONE (ROXICODONE) 5 MG tablet Take 0.5 tablets (2.5 mg) by mouth every 6 hours as needed for severe pain 15 tablet 0     Probiotic Product (PROBIOTIC DAILY PO) Take 2 packets by mouth every morning        propranolol ER (INDERAL LA) 120 MG 24 hr capsule Take 1 capsule (120 mg) by mouth daily 30 capsule 11     rizatriptan (MAXALT-MLT) 5 MG ODT TAKE 1-2 TABLETS (5-10 MG) BY MOUTH AT ONSET OF HEADACHE FOR MIGRAINE (MAX 30 MG IN 24 HOURS) 18 tablet 11     senna (SENOKOT) 8.6 MG tablet Take 17.2-34.4 mg by mouth       SUMAtriptan (IMITREX STATDOSE) 6 MG/0.5ML pen injector kit Inject 0.5 ml (6 mg) subcutaneously at onset of migraine, May repeat in 1 hour , Max 12 mg/24 hrs 2 kit 11     tiZANidine (ZANAFLEX) 4 MG tablet Take 1 tablet (4 mg) by mouth 4 times daily as needed for muscle spasms 360  tablet 3     traMADol (ULTRAM) 50 MG tablet Take 1 tablet (50 mg) by mouth every 6 hours as needed for severe pain 60 tablet 0     vitamin B complex with vitamin C (VITAMIN  B COMPLEX) TABS tablet Take 1 tablet by mouth daily       VITAMIN D, CHOLECALCIFEROL, PO Take 2,000 Units by mouth daily         PAST SURGICAL HISTORY:  Past Surgical History:   Procedure Laterality Date     ADENOIDECTOMY  1981     AS REPAIR OF NASAL SEPTUM       BACK SURGERY  12/09/2013  10/01/2014    Spinal fusion L4-S1, L5 moving 5/8ths in. Remove screws/rods     BIOPSY  01/01/2008 & 3/2017    mole biopsy, lymph node biopsy     COLONOSCOPY  3/2017    Colonoscopy and GI     ESOPHAGEAL IMPEDENCE FUNCTION TEST WITH 24 HOUR PH GREATER THAN 1 HOUR N/A 9/17/2019    Procedure: IMPEDANCE PH STUDY, ESOPHAGUS, 24 HOUR;  Surgeon: Raghavendra Grande MD;  Location:  GI     ESOPHAGOSCOPY, GASTROSCOPY, DUODENOSCOPY (EGD), COMBINED N/A 8/3/2018    Procedure: COMBINED ESOPHAGOSCOPY, GASTROSCOPY, DUODENOSCOPY (EGD), BIOPSY SINGLE OR MULTIPLE;;  Surgeon: Juan Woodson MD;  Location:  GI     ESOPHAGOSCOPY, GASTROSCOPY, DUODENOSCOPY (EGD), COMBINED N/A 10/22/2018    Procedure: COMBINED ESOPHAGOSCOPY, GASTROSCOPY, DUODENOSCOPY (EGD), BIOPSY SINGLE OR MULTIPLE;  Surgeon: Sadia Carolina MD;  Location:  GI     ESOPHAGOSCOPY, GASTROSCOPY, DUODENOSCOPY (EGD), COMBINED N/A 12/17/2018    Procedure: COMBINED ESOPHAGOSCOPY, GASTROSCOPY, DUODENOSCOPY (EGD);  Surgeon: Juan Woodson MD;  Location:  GI     NASAL/SINUS POLYPECTOMY  2017    Polyps were benign     TONSILLECTOMY       TONSILLECTOMY  1981     TUBAL LIGATION         ALLERGIES:     Allergies   Allergen Reactions     Bee Venom Anaphylaxis, Difficulty breathing, Palpitations, Shortness Of Breath and Visual Disturbance     Patient does carry Epi-Pen     No Clinical Screening - See Comments Anaphylaxis     Chicken-Derived Products (Egg) Diarrhea and Nausea     Patient will self monitor  Other  reaction(s): Nausea  Patient will self monitor  Other reaction(s): GI intolerance     Compazine [Prochlorperazine] Other (See Comments)     Extreme jittery     Food      Milk     Gabapentin      Gluten Meal Other (See Comments)     Pregabalin Other (See Comments)     Seasonal Allergies Unknown     Dust, mold     Topiramate      Wheat Diarrhea     Wheat Bran Nausea     Patient will self monitor  Other reaction(s): Nausea  Patient will self monitor       FAMILY HISTORY:  Family History   Problem Relation Age of Onset     Connective Tissue Disorder Mother         eds     Connective Tissue Disorder Sister         eds     Cancer Father         Spinal tumor grew into spinal cord, went in brain     Migraines Father      Diabetes Maternal Grandmother         Elderly diabetes     Heart Disease Maternal Grandmother      Hypertension Maternal Grandmother      Diabetes Paternal Grandmother         Elderly diabetes     Diabetes Paternal Grandfather         Elderly diabetes     Asthma Sister         Pediatric Asthma     Migraines Sister      Asthma Son         Pediatric Asthma     Thyroid Disease Sister         ,     Migraines Sister      Migraines Sister      Breast Cancer No family hx of      - Premature coronary artery disease  - Atrial fibrillation  - Sudden cardiac death     SOCIAL HISTORY:  Social History     Tobacco Use     Smoking status: Former Smoker     Packs/day: 0.20     Years: 10.00     Pack years: 2.00     Types: Cigarettes     Start date: 1991     Last attempt to quit: 2013     Years since quittin.9     Smokeless tobacco: Never Used     Tobacco comment: I somked on and off during specified dates.   Substance Use Topics     Alcohol use: Yes     Comment: occasional     Drug use: Yes     Types: Marijuana     Comment: medical marijuana       ROS:   Answers for HPI/ROS submitted by the patient on 2019   General Symptoms: Yes  Skin Symptoms: Yes  HENT Symptoms: Yes  EYE SYMPTOMS: Yes  HEART SYMPTOMS:  Yes  LUNG SYMPTOMS: Yes  INTESTINAL SYMPTOMS: Yes  URINARY SYMPTOMS: Yes  GYNECOLOGIC SYMPTOMS: No  BREAST SYMPTOMS: No  SKELETAL SYMPTOMS: Yes  BLOOD SYMPTOMS: Yes  NERVOUS SYSTEM SYMPTOMS: Yes  MENTAL HEALTH SYMPTOMS: No  Fever: No  Loss of appetite: Yes  Weight loss: Yes  Weight gain: No  Fatigue: Yes  Night sweats: Yes  Chills: Yes  Increased stress: No  Excessive hunger: No  Excessive thirst: No  Feeling hot or cold when others believe the temperature is normal: Yes  Loss of height: No  Post-operative complications: Yes  Surgical site pain: Yes  Hallucinations: No  Change in or Loss of Energy: Yes  Hyperactivity: No  Confusion: Yes  Changes in hair: Yes  Changes in moles/birth marks: No  Itching: Yes  Rashes: Yes  Changes in nails: Yes  Acne: No  Hair in places you don't want it: No  Change in facial hair: No  Warts: No  Non-healing sores: Yes  Scarring: Yes  Flaking of skin: Yes  Color changes of hands/feet in cold : Yes  Sun sensitivity: No  Skin thickening: Yes  Ear pain: Yes  Ear discharge: Yes  Hearing loss: Yes  Tinnitus: Yes  Nosebleeds: No  Congestion: Yes  Sinus pain: Yes  Trouble swallowing: Yes   Voice hoarseness: Yes  Mouth sores: No  Sore throat: Yes  Tooth pain: Yes  Gum tenderness: No  Bleeding gums: No  Change in taste: Yes  Change in sense of smell: Yes  Dry mouth: Yes  Hearing aid used: No  Neck lump: Yes  Eye pain: No  Vision loss: Yes  Dry eyes: Yes  Watery eyes: Yes  Eye bulging: No  Double vision: Yes  Flashing of lights: Yes  Spots: Yes  Floaters: Yes  Redness: Yes  Crossed eyes: No  Tunnel Vision: Yes  Yellowing of eyes: No  Eye irritation: Yes  Cough: Yes  Sputum or phlegm: Yes  Coughing up blood: No  Difficulty breating or shortness of breath: Yes  Snoring: Yes  Wheezing: Yes  Difficulty breathing on exertion: Yes  Nighttime Cough: No  Difficulty breathing when lying flat: Yes  Chest pain or pressure: Yes  Fast or irregular heartbeat: Yes  Pain in legs with walking: Yes  Trouble  "breathing while lying down: Yes  High blood pressure: No  Low blood pressure: Yes  Fainting: No  Murmurs: No  Pacemaker: No  Varicose veins: No  Edema or swelling: Yes  Wake up at night with shortness of breath: No  Light-headedness: Yes  Exercise intolerance: Yes  Heart burn or indigestion: Yes  Nausea: Yes  Vomiting: No  Abdominal pain: Yes  Bloating: Yes  Constipation: Yes  Diarrhea: Yes  Blood in stool: No  Black stools: No  Rectal or Anal pain: No  Fecal incontinence: No  Yellowing of skin or eyes: No  Vomit with blood: No  Change in stools: Yes  Trouble holding urine or incontinence: Yes  Pain or burning: Yes  Trouble starting or stopping: Yes  Increased frequency of urination: Yes  Blood in urine: No  Decreased frequency of urination: No  Frequent nighttime urination: Yes  Flank pain: Yes  Difficulty emptying bladder: Yes  Back pain: Yes  Muscle aches: Yes  Neck pain: Yes  Swollen joints: Yes  Joint pain: Yes  Bone pain: Yes  Muscle cramps: Yes  Muscle weakness: Yes  Joint stiffness: Yes  Bone fracture: No  Anemia: No  Swollen glands: Yes  Easy bleeding or bruising: Yes  Trouble with coordination: Yes  Dizziness or trouble with balance: Yes  Fainting or black-out spells: Yes  Memory loss: Yes  Headache: Yes  Seizures: No  Speech problems: Yes  Tingling: Yes  Tremor: Yes  Weakness: Yes  Difficulty walking: Yes  Paralysis: No  Numbness: Yes      Exam:  /87 (BP Location: Right arm, Patient Position: Chair, Cuff Size: Adult Regular)   Pulse 72   Ht 1.702 m (5' 7\")   Wt 88.9 kg (196 lb)   LMP 11/12/2019 (Exact Date)   SpO2 97%   BMI 30.70 kg/m    GENERAL APPEARANCE: healthy, alert and no distress  HEENT: no icterus, no xanthelasmas, normal pupil size and reaction, normal palate, mucosa moist, no central cyanosis  NECK: no adenopathy, no asymmetry, masses, or scars, thyroid normal to palpation and no bruits, JVP not elevated  RESPIRATORY: lungs clear to auscultation - no rales, rhonchi or wheezes, no " use of accessory muscles, no retractions, respirations are unlabored, normal respiratory rate  CARDIOVASCULAR: regular rhythm, normal S1 with physiologic split S2, no S3 or S4 and no murmur, click or rub, precordium quiet with normal PMI.  ABDOMEN: soft, non tender, without hepatosplenomegaly, no masses palpable, bowel sounds normal, aorta not enlarged by palpation, no abdominal bruits  EXTREMITIES: peripheral pulses normal, no edema, no bruits  NEURO: alert and oriented to person/place/time, normal speech, gait and affect  VASC: Radial, femoral, dorsalis pedis and posterior tibialis pulses are normal in volumes and symmetric bilaterally. No bruits are heard.  SKIN: no ecchymoses, no rashes    Labs:  CBC RESULTS:   Lab Results   Component Value Date    WBC 8.9 03/03/2019    RBC 4.91 03/03/2019    HGB 13.7 03/03/2019    HCT 41.6 03/03/2019    MCV 85 03/03/2019    MCH 27.9 03/03/2019    MCHC 32.9 03/03/2019    RDW 13.7 03/03/2019     03/03/2019       BMP RESULTS:  Lab Results   Component Value Date     03/03/2019    POTASSIUM 3.4 03/03/2019    CHLORIDE 102 03/03/2019    CO2 25 03/03/2019    ANIONGAP 10 03/03/2019    GLC 95 03/03/2019    BUN 9 03/03/2019    CR 0.82 03/03/2019    GFRESTIMATED 86 03/03/2019    GFRESTBLACK >90 03/03/2019    JUAN 8.6 03/03/2019        INR RESULTS:  Lab Results   Component Value Date    INR 0.97 02/28/2019    INR 0.90 12/17/2018    INR 0.96 07/06/2018    INR 1.01 07/25/2017       Procedures:  PULMONARY FUNCTION TESTS:   No flowsheet data found.      ECHOCARDIOGRAM 8/30/2017:   Left ventricular function, chamber size, wall motion, and wall thickness are  normal.The EF is 60-65%.  Normal right ventricular size and systolic function.  No significant valve disease.    Assessment and Plan:     1.  Autonomic dysfunction - continue propranolol ER 120mg qday. Add ivabradine 7.5mg BID    2.  Orthostatic hypotension - continue current fluid/electrolyte intake and compression pants. Will  ask pt to f/u by phone or MyChart to consider also starting fludrocortisone 0.1mg qday.    3.  Chest pain which is most likely musculoskeletal.  Lexiscan was normal.     4.  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  An echo 1 year ago did not reveal any problem. Surveillance every couple years should be appropriate.    5.  Clinic follow-up in 6 months.     Patient was seen and discussed with Dr. Kirk Russell MD.    Daryl Pool MD, PhD  Cardiology Fellow  Click to text page [phone]    I very much appreciated the opportunity to see and assess Ms Ada Welch in the clinic  Today    I agree with the note above which summarizes my findings and current recommendations    Please do not hesitate to contact my office if you have any questions or concerns.      Kirk Russell MD  Cardiac Arrhythmia Service  [facility]  [phone]     CC  YUSRA PICKARD

## 2019-11-20 ENCOUNTER — HOSPITAL ENCOUNTER (OUTPATIENT)
Dept: PHYSICAL THERAPY | Facility: CLINIC | Age: 49
Setting detail: THERAPIES SERIES
End: 2019-11-20
Attending: INTERNAL MEDICINE
Payer: COMMERCIAL

## 2019-11-20 PROCEDURE — 97110 THERAPEUTIC EXERCISES: CPT | Mod: GP | Performed by: PHYSICAL THERAPIST

## 2019-11-20 PROCEDURE — 97140 MANUAL THERAPY 1/> REGIONS: CPT | Mod: GP | Performed by: PHYSICAL THERAPIST

## 2019-11-22 ENCOUNTER — MYC MEDICAL ADVICE (OUTPATIENT)
Dept: SURGERY | Facility: CLINIC | Age: 49
End: 2019-11-22

## 2019-11-22 ENCOUNTER — TELEPHONE (OUTPATIENT)
Dept: ANESTHESIOLOGY | Facility: CLINIC | Age: 49
End: 2019-11-22

## 2019-11-22 DIAGNOSIS — G96.00 CSF LEAK: Primary | ICD-10-CM

## 2019-11-22 DIAGNOSIS — M96.1 POSTLAMINECTOMY SYNDROME OF LUMBAR REGION: ICD-10-CM

## 2019-11-22 DIAGNOSIS — M96.1 POSTLAMINECTOMY SYNDROME: ICD-10-CM

## 2019-11-22 RX ORDER — BUPRENORPHINE 10 UG/H
1 PATCH TRANSDERMAL
Qty: 4 PATCH | Refills: 1 | Status: SHIPPED | OUTPATIENT
Start: 2019-11-22 | End: 2019-12-20

## 2019-11-22 RX ORDER — TRAMADOL HYDROCHLORIDE 50 MG/1
50 TABLET ORAL EVERY 6 HOURS PRN
Qty: 60 TABLET | Refills: 0 | Status: SHIPPED | OUTPATIENT
Start: 2019-11-22 | End: 2019-12-20

## 2019-11-22 RX ORDER — OXYCODONE HYDROCHLORIDE 5 MG/1
2.5 TABLET ORAL EVERY 6 HOURS PRN
Qty: 15 TABLET | Refills: 0 | Status: CANCELLED | OUTPATIENT
Start: 2019-11-22

## 2019-11-22 NOTE — TELEPHONE ENCOUNTER
Prior Authorization Retail Medication Request    Medication/Dose: buprenorphine (BUTRANS) 10 MCG/HR WK patch  ICD code (if different than what is on RX):    Previously Tried and Failed:    Rationale:      Insurance Name:  [employer] CHOICE [[id number]]  Insurance ID:  [id number]      Pharmacy Information (if different than what is on RX)  Name:    Phone:

## 2019-11-22 NOTE — TELEPHONE ENCOUNTER
Refill request    Medication:    buprenorphine (BUTRANS) 10 MCG/HR WK patch  Place 1 patch onto the skin every 7 days    oxyCODONE (ROXICODONE) 5 MG tablet   Take 0.5 tablets (2.5 mg) by mouth every 6 hours as needed for severe pain       MNPMP Checked: Yes    buprenorphine (BUTRANS) 10 MCG/HR WK patch - Last refilled 10/16/19 for 4 patches  Oxycodone last refilled 10/2/19 for 15 tablets     Refilled: Yes  Dated to be refilled on: 11/22/19    Last clinic appointment: 10/31/19  Next clinic appointment: Pt advised to follow up in 3 months and is able to call in for refills in between appointments.      Patient requested to:      Sent to pharmacy    Verbal orders given by Dr. Martinez to refill pt's tramadol.

## 2019-11-22 NOTE — TELEPHONE ENCOUNTER
Per documentation on the on the order, the PA has been approved through the Epic ePa process.

## 2019-11-27 ENCOUNTER — TELEPHONE (OUTPATIENT)
Dept: SURGERY | Facility: CLINIC | Age: 49
End: 2019-11-27

## 2019-11-27 NOTE — TELEPHONE ENCOUNTER
I spoke to Scarlett about her message asking to proceed with surgery.  I discussed this with Dr. Mack.   We want her to keep her upcoming appointment in GI Clinic/consultation.   Dr. Mack said we could look at surgery date after 1st of year and Scarlett was in agreement with this.  I will ask KEYA Sim and Dr. Mack for further surgery clarification.

## 2019-11-29 RX ORDER — OXYCODONE HYDROCHLORIDE 5 MG/1
2.5 TABLET ORAL EVERY 6 HOURS PRN
Qty: 15 TABLET | Refills: 0 | Status: CANCELLED | OUTPATIENT
Start: 2019-11-29

## 2019-12-04 ENCOUNTER — PREP FOR PROCEDURE (OUTPATIENT)
Dept: SURGERY | Facility: CLINIC | Age: 49
End: 2019-12-04

## 2019-12-04 DIAGNOSIS — K44.9 HIATAL HERNIA: Primary | ICD-10-CM

## 2019-12-04 RX ORDER — CEFAZOLIN SODIUM 2 G/50ML
2 SOLUTION INTRAVENOUS
Status: CANCELLED | OUTPATIENT
Start: 2019-12-04

## 2019-12-04 RX ORDER — CEFAZOLIN SODIUM 1 G/50ML
1 INJECTION, SOLUTION INTRAVENOUS SEE ADMIN INSTRUCTIONS
Status: CANCELLED | OUTPATIENT
Start: 2019-12-04

## 2019-12-05 RX ORDER — OXYCODONE HYDROCHLORIDE 5 MG/1
2.5 TABLET ORAL EVERY 6 HOURS PRN
Qty: 15 TABLET | Refills: 0 | Status: SHIPPED | OUTPATIENT
Start: 2019-12-05 | End: 2020-01-23

## 2019-12-05 RX ORDER — OXYCODONE HYDROCHLORIDE 5 MG/1
2.5 TABLET ORAL EVERY 6 HOURS PRN
Qty: 15 TABLET | Refills: 0 | Status: CANCELLED | OUTPATIENT
Start: 2019-12-05

## 2019-12-12 ENCOUNTER — TELEPHONE (OUTPATIENT)
Dept: GASTROENTEROLOGY | Facility: CLINIC | Age: 49
End: 2019-12-12

## 2019-12-12 NOTE — TELEPHONE ENCOUNTER
Called and left message for patient reminding of appointment scheduled on 12/16/19 at 420pm with [facility] GI clinic. Patient to arrive 15 min early. To reschedule or cancel patient to call [phone].    LUX Cuadra

## 2019-12-13 DIAGNOSIS — K20.0 EOSINOPHILIC ESOPHAGITIS: ICD-10-CM

## 2019-12-13 RX ORDER — OMEPRAZOLE 40 MG/1
40 CAPSULE, DELAYED RELEASE ORAL 2 TIMES DAILY PRN
Qty: 30 CAPSULE | Refills: 0 | Status: SHIPPED | OUTPATIENT
Start: 2019-12-13 | End: 2020-01-13

## 2019-12-13 NOTE — TELEPHONE ENCOUNTER
omeprazole (PRILOSEC) 40 MG capsule  Last Written Prescription Date: 11/15/18  Last Fill Quantity: 180,   # refills: 2  Last Office Visit : 8/8/18  Future Office visit:  12/16/19

## 2019-12-16 ENCOUNTER — PRE VISIT (OUTPATIENT)
Dept: GASTROENTEROLOGY | Facility: CLINIC | Age: 49
End: 2019-12-16

## 2019-12-19 ENCOUNTER — PREP FOR PROCEDURE (OUTPATIENT)
Dept: SURGERY | Facility: CLINIC | Age: 49
End: 2019-12-19

## 2019-12-19 DIAGNOSIS — M96.1 POSTLAMINECTOMY SYNDROME OF LUMBAR REGION: ICD-10-CM

## 2019-12-19 DIAGNOSIS — K44.9 HIATAL HERNIA: Primary | ICD-10-CM

## 2019-12-19 DIAGNOSIS — M96.1 POSTLAMINECTOMY SYNDROME: ICD-10-CM

## 2019-12-19 DIAGNOSIS — M96.1 POST LAMINECTOMY SYNDROME: ICD-10-CM

## 2019-12-19 RX ORDER — CEFAZOLIN SODIUM 2 G/50ML
2 SOLUTION INTRAVENOUS
Status: CANCELLED | OUTPATIENT
Start: 2019-12-19

## 2019-12-19 RX ORDER — CEFAZOLIN SODIUM 1 G/50ML
1 INJECTION, SOLUTION INTRAVENOUS SEE ADMIN INSTRUCTIONS
Status: CANCELLED | OUTPATIENT
Start: 2019-12-19

## 2019-12-19 NOTE — TELEPHONE ENCOUNTER
Refill request    Medication:      traMADol (ULTRAM) 50 MG tablet  Take 1 tablet (50 mg) by mouth every 6 hours as needed for severe pain         MNPMP Checked: Yes    traMADol (ULTRAM) 50 MG tablet - Last refilled 11/22/19 for 60 tablets    Refilled: Yes  Dated to be refilled on: 11/22/19    Last clinic appointment: 10/31/19  Next clinic appointment: 1/30/20    Patient requested to:      Sent to pharmacy        [facility] prescription cancelled.  Pt still has remaining refill at pharmacy.     Jessica De Souza, RN, BSN

## 2019-12-20 RX ORDER — BUPRENORPHINE 10 UG/H
1 PATCH TRANSDERMAL
Qty: 4 PATCH | Refills: 0 | Status: SHIPPED | OUTPATIENT
Start: 2019-12-20 | End: 2019-12-20

## 2019-12-20 RX ORDER — TRAMADOL HYDROCHLORIDE 50 MG/1
50 TABLET ORAL EVERY 6 HOURS PRN
Qty: 60 TABLET | Refills: 0 | Status: SHIPPED | OUTPATIENT
Start: 2019-12-20 | End: 2020-01-10

## 2019-12-30 DIAGNOSIS — G43.719 INTRACTABLE CHRONIC MIGRAINE WITHOUT AURA AND WITHOUT STATUS MIGRAINOSUS: ICD-10-CM

## 2019-12-30 RX ORDER — BUTALBITAL, ACETAMINOPHEN AND CAFFEINE 50; 325; 40 MG/1; MG/1; MG/1
TABLET ORAL
Qty: 20 TABLET | Refills: 0 | Status: ON HOLD | OUTPATIENT
Start: 2019-12-30 | End: 2020-02-13

## 2019-12-30 RX ORDER — METOCLOPRAMIDE 10 MG/1
10 TABLET ORAL
Qty: 40 TABLET | Refills: 3 | Status: SHIPPED | OUTPATIENT
Start: 2019-12-30 | End: 2020-04-21

## 2020-01-03 ENCOUNTER — OFFICE VISIT (OUTPATIENT)
Dept: NEUROSURGERY | Facility: CLINIC | Age: 50
End: 2020-01-03
Payer: COMMERCIAL

## 2020-01-03 VITALS
HEIGHT: 68 IN | SYSTOLIC BLOOD PRESSURE: 121 MMHG | BODY MASS INDEX: 28.43 KG/M2 | DIASTOLIC BLOOD PRESSURE: 85 MMHG | HEART RATE: 71 BPM | OXYGEN SATURATION: 97 % | WEIGHT: 187.6 LBS

## 2020-01-03 DIAGNOSIS — M54.2 CERVICALGIA: Primary | ICD-10-CM

## 2020-01-03 RX ORDER — BUPRENORPHINE 10 UG/H
PATCH TRANSDERMAL
COMMUNITY
Start: 2019-12-17 | End: 2020-01-23

## 2020-01-03 ASSESSMENT — ENCOUNTER SYMPTOMS
LEG PAIN: 1
DOUBLE VISION: 1
SPUTUM PRODUCTION: 0
DYSPNEA ON EXERTION: 1
EYE IRRITATION: 1
SEIZURES: 0
HOARSE VOICE: 1
SWOLLEN GLANDS: 1
HYPOTENSION: 1
JOINT SWELLING: 1
SHORTNESS OF BREATH: 1
BLOATING: 1
INCREASED ENERGY: 1
NAIL CHANGES: 1
LOSS OF CONSCIOUSNESS: 1
DECREASED APPETITE: 1
MUSCLE CRAMPS: 1
SMELL DISTURBANCE: 1
HALLUCINATIONS: 0
COUGH DISTURBING SLEEP: 0
TREMORS: 1
SINUS CONGESTION: 1
POLYPHAGIA: 0
EYE PAIN: 1
EYE REDNESS: 1
WEIGHT GAIN: 0
RECTAL PAIN: 0
SYNCOPE: 0
ORTHOPNEA: 0
WEIGHT LOSS: 1
BLOOD IN STOOL: 0
COUGH: 0
HEARTBURN: 1
HEMOPTYSIS: 0
JAUNDICE: 0
BRUISES/BLEEDS EASILY: 1
BACK PAIN: 1
MEMORY LOSS: 1
BOWEL INCONTINENCE: 0
WHEEZING: 1
HEADACHES: 1
SKIN CHANGES: 0
EXERCISE INTOLERANCE: 1
NAUSEA: 1
ABDOMINAL PAIN: 1
DYSURIA: 1
TINGLING: 1
STIFFNESS: 1
ALTERED TEMPERATURE REGULATION: 1
POLYDIPSIA: 1
EYE WATERING: 1
TROUBLE SWALLOWING: 1
SNORES LOUDLY: 1
TASTE DISTURBANCE: 1
FLANK PAIN: 1
NECK MASS: 1
CHILLS: 1
POSTURAL DYSPNEA: 0
PARALYSIS: 0
SORE THROAT: 0
NECK PAIN: 1
SINUS PAIN: 1
DIZZINESS: 1
MYALGIAS: 1
FEVER: 1
HYPERTENSION: 0
NIGHT SWEATS: 1
SPEECH CHANGE: 1
ARTHRALGIAS: 1
CONSTIPATION: 0
DIARRHEA: 1
NUMBNESS: 1
PALPITATIONS: 1
FATIGUE: 1
WEAKNESS: 1
SLEEP DISTURBANCES DUE TO BREATHING: 0
HEMATURIA: 0
LIGHT-HEADEDNESS: 1
MUSCLE WEAKNESS: 1
DISTURBANCES IN COORDINATION: 1
VOMITING: 0
DIFFICULTY URINATING: 1
POOR WOUND HEALING: 1

## 2020-01-03 ASSESSMENT — MIFFLIN-ST. JEOR: SCORE: 1521.51

## 2020-01-03 ASSESSMENT — PATIENT HEALTH QUESTIONNAIRE - PHQ9
SUM OF ALL RESPONSES TO PHQ QUESTIONS 1-9: 2
SUM OF ALL RESPONSES TO PHQ QUESTIONS 1-9: 2
10. IF YOU CHECKED OFF ANY PROBLEMS, HOW DIFFICULT HAVE THESE PROBLEMS MADE IT FOR YOU TO DO YOUR WORK, TAKE CARE OF THINGS AT HOME, OR GET ALONG WITH OTHER PEOPLE: NOT DIFFICULT AT ALL

## 2020-01-03 ASSESSMENT — PAIN SCALES - GENERAL: PAINLEVEL: EXTREME PAIN (8)

## 2020-01-03 NOTE — NURSING NOTE
Chief Complaint   Patient presents with     Consult     [facility] NEW - SPINAL STENOSIS OF LUMBAR REGION, UNSPECIFIED WHETHER NEUROGENIC CLAUDICATION PRESENT       Bishop Díaz, EMT

## 2020-01-03 NOTE — TELEPHONE ENCOUNTER
Reminder call placed to pt.   Pt reminded of Provider, date and time of the appointment.   Pt was asked to arrive 15 minutes early to fill out the questionnaires.   Clinic phone number provided if pt needed to reschedule.     Cathleen Connors, CMA    
98.2

## 2020-01-03 NOTE — LETTER
"     RE: Ada Welch  [address]  [place] [address]     Dear Colleague,    Thank you for referring your patient, Ada Welch, to the [facility] NEUROSURGERY at [facility]. Please see a copy of my visit note below.    Service Date: 01/03/2020      HISTORY OF PRESENT ILLNESS:  Ms. Welch is a 49-year-old female returning to Neurosurgery Clinic to discuss worsening neck and left arm pain.  Ms. Welch has a fairly complicated past medical history that consists of a type III Tom-Danlos, cervicalgia, cervical instability, mast cell disorder, TMJ, lumbar stenosis status post L4-L5 and L5-S1 TLIF.  The patient suffers from chronic back pain but is here to discuss worsening neck and left arm pain that started over the summer.  No specific inciting event.  The pain in her left arm seems to come from her neck and travel down approximately a C8 distribution, ending in her fingers.  No decreased sensation or weakness reported.  Being upright seems to make the symptoms worse.     The patient also complains of pain extending from the left side of her neck that travels behind her ear and blossoms out encompassing the left side of her head.  This was thought to be occipital neuralgia.  She underwent a selective nerve block, however, this was not helpful.     As part of the patient's chronic pain syndrome, she suffers from frequent severe headaches.  She has been following with a neurologist who feels that these may be consistent with low CSF pressure headaches, in the setting of her Tom-Danlos.  She has undergone blood patches along her lumbar spine where her prior TLIF surgery was.  One of these was very successful for several months, however, a second was not as beneficial.     PHYSICAL EXAMINATION:  /85 (BP Location: Left arm, Patient Position: Sitting, Cuff Size: Adult Large)   Pulse 71   Ht 1.715 m (5' 7.5\")   Wt 85.1 kg (187 lb 9.6 oz)   SpO2 97%   BMI 28.95 " kg/m     The patient is alert and appropriately interactive.  Primarily sits with her head stooped forward in her hands.  Strength and sensation intact in bilateral upper and lower extremities.  Left upper and lower extremity hyperreflexia with positive left Moe sign.  No tenderness to palpation through occipital nerve innervation region.      IMAGING:  A cervical MRI dated 11/02/2019 was reviewed.  This imaging is largely unchanged from her prior reviewed MRI.  There is ongoing cerebellar ectopia measuring approximately 5 mm.  There is good CSF signal surrounding the brainstem.  Mild degenerative disease at C4-C6 levels.  Mild spinal canal stenosis without effacement of the cord or T2 signal change.  No significant neural foraminal stenosis is appreciated.  Thoracic spine MRI dated 11/02/2019 reviewed.  This is a largely normal image with several vertebral body lesions.  Lumbar spine MRI dated 11/02/2019 was reviewed.  Prior TLIF surgical changes are noted at L4-S1.  Nerve root clumping noted in the lower lumbar cistern.  Several small vertebral body lesions.      ASSESSMENT:   1.  Cervicalgia with radicular symptoms.  No surgical target identified on MRI to explain the patient's symptoms.   2.  Low pressure headaches.  No CSF leak is evident on the MRI.  However, the patient has had clinical benefit from blood patches.  3.  Occipital neuralgia.  The patient did not have benefit from a selective nerve root block.  However, the distribution of the patient's pain is consistent with an occipital nerve on the left side.   4.  Vertebral body lesions consistent with hemangiomas.     PLAN:   1.  The patient was encouraged to continue receiving blood patches as she has found these to be quite beneficial.  We encourage continued follow up with Neurology for coordination of these efforts.  From our standpoint, there are not any contraindications to ongoing treatment.   2.  No surgical target identified to address the  patient's pain in a C8 distribution.  This has been a chronic complaint of the patient without prior identified targets.  Please see our previous clinic visit in 2017 with regards to this same problem.   3.  Ongoing management of suspected occipital neuralgia per her neurologist and pain team.  If the patient receives benefit from selective nerve root treatments, she could be considered for nerve section.  4.  Lesions within the vertebral body are likely consistent with hemangiomas and are benign without any neurosurgical intervention necessary.     I have seen this patient with the resident and formulated a plan and agree with this note.     DESTIN SANDOVAL MD       D: 2020   T: 2020   MT: smitha      Name:     PANCHO HENAO   MRN:      [id number]        Account:      [id number]   :      1970           Service Date: 2020      Document: [id number]

## 2020-01-04 ASSESSMENT — PATIENT HEALTH QUESTIONNAIRE - PHQ9: SUM OF ALL RESPONSES TO PHQ QUESTIONS 1-9: 2

## 2020-01-05 NOTE — PROGRESS NOTES
"Service Date: 01/03/2020      HISTORY OF PRESENT ILLNESS:  Ms. Welch is a 49-year-old female returning to Neurosurgery Clinic to discuss worsening neck and left arm pain.  Ms. Welch has a fairly complicated past medical history that consists of a type III Tom-Danlos, cervicalgia, cervical instability, mast cell disorder, TMJ, lumbar stenosis status post L4-L5 and L5-S1 TLIF.  The patient suffers from chronic back pain but is here to discuss worsening neck and left arm pain that started over the summer.  No specific inciting event.  The pain in her left arm seems to come from her neck and travel down approximately a C8 distribution, ending in her fingers.  No decreased sensation or weakness reported.  Being upright seems to make the symptoms worse.     The patient also complains of pain extending from the left side of her neck that travels behind her ear and blossoms out encompassing the left side of her head.  This was thought to be occipital neuralgia.  She underwent a selective nerve block, however, this was not helpful.     As part of the patient's chronic pain syndrome, she suffers from frequent severe headaches.  She has been following with a neurologist who feels that these may be consistent with low CSF pressure headaches, in the setting of her Tom-Danlos.  She has undergone blood patches along her lumbar spine where her prior TLIF surgery was.  One of these was very successful for several months, however, a second was not as beneficial.     PHYSICAL EXAMINATION:  /85 (BP Location: Left arm, Patient Position: Sitting, Cuff Size: Adult Large)   Pulse 71   Ht 1.715 m (5' 7.5\")   Wt 85.1 kg (187 lb 9.6 oz)   SpO2 97%   BMI 28.95 kg/m    The patient is alert and appropriately interactive.  Primarily sits with her head stooped forward in her hands.  Strength and sensation intact in bilateral upper and lower extremities.  Left upper and lower extremity hyperreflexia with positive left Moe " sign.  No tenderness to palpation through occipital nerve innervation region.      IMAGING:  A cervical MRI dated 11/02/2019 was reviewed.  This imaging is largely unchanged from her prior reviewed MRI.  There is ongoing cerebellar ectopia measuring approximately 5 mm.  There is good CSF signal surrounding the brainstem.  Mild degenerative disease at C4-C6 levels.  Mild spinal canal stenosis without effacement of the cord or T2 signal change.  No significant neural foraminal stenosis is appreciated.  Thoracic spine MRI dated 11/02/2019 reviewed.  This is a largely normal image with several vertebral body lesions.  Lumbar spine MRI dated 11/02/2019 was reviewed.  Prior TLIF surgical changes are noted at L4-S1.  Nerve root clumping noted in the lower lumbar cistern.  Several small vertebral body lesions.      ASSESSMENT:   1.  Cervicalgia with radicular symptoms.  No surgical target identified on MRI to explain the patient's symptoms.   2.  Low pressure headaches.  No CSF leak is evident on the MRI.  However, the patient has had clinical benefit from blood patches.  3.  Occipital neuralgia.  The patient did not have benefit from a selective nerve root block.  However, the distribution of the patient's pain is consistent with an occipital nerve on the left side.   4.  Vertebral body lesions consistent with hemangiomas.     PLAN:   1.  The patient was encouraged to continue receiving blood patches as she has found these to be quite beneficial.  We encourage continued follow up with Neurology for coordination of these efforts.  From our standpoint, there are not any contraindications to ongoing treatment.   2.  No surgical target identified to address the patient's pain in a C8 distribution.  This has been a chronic complaint of the patient without prior identified targets.  Please see our previous clinic visit in 2017 with regards to this same problem.   3.  Ongoing management of suspected occipital neuralgia per her  neurologist and pain team.  If the patient receives benefit from selective nerve root treatments, she could be considered for nerve section.  4.  Lesions within the vertebral body are likely consistent with hemangiomas and are benign without any neurosurgical intervention necessary.        I have seen this patient with the resident and formulated a plan and agree with this note.  CORETTA SANDOVAL MD       As dictated by JONY TSE MD            D: 2020   T: 2020   MT: smitha      Name:     PANCHO HENAO   MRN:      [id number]        Account:      [id number]   :      1970           Service Date: 2020      Document: [id number]

## 2020-01-09 ENCOUNTER — PREP FOR PROCEDURE (OUTPATIENT)
Dept: SURGERY | Facility: CLINIC | Age: 50
End: 2020-01-09

## 2020-01-09 DIAGNOSIS — K21.00 GASTROESOPHAGEAL REFLUX DISEASE WITH ESOPHAGITIS: Primary | ICD-10-CM

## 2020-01-09 RX ORDER — CEFAZOLIN SODIUM 1 G/50ML
1 INJECTION, SOLUTION INTRAVENOUS SEE ADMIN INSTRUCTIONS
Status: CANCELLED | OUTPATIENT
Start: 2020-01-09

## 2020-01-09 RX ORDER — CEFAZOLIN SODIUM 2 G/50ML
2 SOLUTION INTRAVENOUS
Status: CANCELLED | OUTPATIENT
Start: 2020-01-09

## 2020-01-10 ENCOUNTER — HOSPITAL ENCOUNTER (OUTPATIENT)
Dept: GENERAL RADIOLOGY | Facility: CLINIC | Age: 50
End: 2020-01-10
Attending: PSYCHIATRY & NEUROLOGY
Payer: COMMERCIAL

## 2020-01-10 ENCOUNTER — HOSPITAL ENCOUNTER (OUTPATIENT)
Facility: CLINIC | Age: 50
Discharge: HOME | End: 2020-01-10
Attending: PSYCHIATRY & NEUROLOGY | Admitting: PSYCHIATRY & NEUROLOGY
Payer: COMMERCIAL

## 2020-01-10 VITALS
RESPIRATION RATE: 16 BRPM | SYSTOLIC BLOOD PRESSURE: 124 MMHG | DIASTOLIC BLOOD PRESSURE: 85 MMHG | TEMPERATURE: 96.4 F | HEART RATE: 50 BPM | OXYGEN SATURATION: 98 % | BODY MASS INDEX: 28.04 KG/M2 | WEIGHT: 185 LBS | HEIGHT: 68 IN

## 2020-01-10 DIAGNOSIS — G96.00 CSF LEAK: ICD-10-CM

## 2020-01-10 DIAGNOSIS — M96.1 POSTLAMINECTOMY SYNDROME: ICD-10-CM

## 2020-01-10 PROCEDURE — 40000863 ZZH STATISTIC RADIOLOGY XRAY, US, CT, MAR, NM

## 2020-01-10 PROCEDURE — 27210986 XR BLOOD PATCH PROCEDURE

## 2020-01-10 PROCEDURE — 40000556 ZZH STATISTIC PERIPHERAL IV START W US GUIDANCE

## 2020-01-10 PROCEDURE — 25500064 ZZH RX 255 OP 636: Performed by: PHYSICIAN ASSISTANT

## 2020-01-10 PROCEDURE — 25000125 ZZHC RX 250: Performed by: PHYSICIAN ASSISTANT

## 2020-01-10 RX ORDER — NICOTINE POLACRILEX 4 MG
15-30 LOZENGE BUCCAL
Status: DISCONTINUED | OUTPATIENT
Start: 2020-01-10 | End: 2020-01-10 | Stop reason: HOSPADM

## 2020-01-10 RX ORDER — IOPAMIDOL 408 MG/ML
10 INJECTION, SOLUTION INTRATHECAL ONCE
Status: COMPLETED | OUTPATIENT
Start: 2020-01-10 | End: 2020-01-10

## 2020-01-10 RX ORDER — DEXTROSE MONOHYDRATE 25 G/50ML
25-50 INJECTION, SOLUTION INTRAVENOUS
Status: DISCONTINUED | OUTPATIENT
Start: 2020-01-10 | End: 2020-01-10 | Stop reason: HOSPADM

## 2020-01-10 RX ORDER — LIDOCAINE HYDROCHLORIDE 10 MG/ML
30 INJECTION, SOLUTION EPIDURAL; INFILTRATION; INTRACAUDAL; PERINEURAL ONCE
Status: COMPLETED | OUTPATIENT
Start: 2020-01-10 | End: 2020-01-10

## 2020-01-10 RX ORDER — LIDOCAINE 40 MG/G
CREAM TOPICAL
Status: DISCONTINUED | OUTPATIENT
Start: 2020-01-10 | End: 2020-01-10 | Stop reason: HOSPADM

## 2020-01-10 RX ORDER — TRAMADOL HYDROCHLORIDE 50 MG/1
50 TABLET ORAL EVERY 6 HOURS PRN
Qty: 30 TABLET | Refills: 0 | Status: SHIPPED | OUTPATIENT
Start: 2020-01-10 | End: 2020-01-23

## 2020-01-10 RX ORDER — DEXTROSE MONOHYDRATE 25 G/50ML
25-50 INJECTION, SOLUTION INTRAVENOUS
Status: CANCELLED | OUTPATIENT
Start: 2020-01-10

## 2020-01-10 RX ORDER — NICOTINE POLACRILEX 4 MG
15-30 LOZENGE BUCCAL
Status: CANCELLED | OUTPATIENT
Start: 2020-01-10

## 2020-01-10 RX ADMIN — LIDOCAINE HYDROCHLORIDE 3 ML: 10 INJECTION, SOLUTION EPIDURAL; INFILTRATION; INTRACAUDAL; PERINEURAL at 13:50

## 2020-01-10 RX ADMIN — IOPAMIDOL 1 ML: 408 INJECTION, SOLUTION INTRATHECAL at 13:53

## 2020-01-10 ASSESSMENT — MIFFLIN-ST. JEOR: SCORE: 1509.71

## 2020-01-10 NOTE — PROGRESS NOTES
Voided large amt urine on commode, then returned to cart, prone position. Report given to Nataliia GOLDEN RN.

## 2020-01-10 NOTE — DISCHARGE INSTRUCTIONS
Blood Patch Discharge Instructions     After you go home:      You may resume your normal diet    Continue to drink at least 8 ounces of fluid every 1-2 hours until bedtime tonight and continue to drink extra fluids for the next 2 days    Caffeinated beverages may help prevent or reduce spinal headaches    Care of Puncture Site:      If there is a bandaid - you may remove it tomorrow morning    You may shower tomorrow    No tub baths, whirlpools or swimming for 48 hours     Activity - to help prevent spinal headache or spinal fluid leakage:      Minimize your activity today. Flat bedrest for 24 hrs is strongly suggested as this will help to prevent a spinal headache.  You can be up to the bathroom and for meals.    Resume normal activities tomorrow.     Avoid strenuous activity for the next 2 days    Do not drive a vehicle until tomorrow morning    Medicines:      You may resume all medications    Resume your Warfarin/Coumadin at your regular dose today. Follow up with your provider to have your INR rechecked    Resume your Platelet Inhibitors and Aspirin tomorrow at your regular dose    For minor pain, you may take Acetaminophen (Tylenol) or Ibuprofen (Advil)            Call the provider who ordered this procedure if:      Your headache becomes worse or is severe. (A minor headache is not unusual)    You have nausea or vomiting    The site is red, swollen, hot or tender    You have chills or a fever greater than 101 F (38 C)    Any questions or concerns    If you have questions call:        [facility] Radiology Dept @ [phone]    The provider who performed your procedure was Willie PALACIOS.

## 2020-01-10 NOTE — PROGRESS NOTES
RADIOLOGY PROCEDURE NOTE  Patient name: Ada Welch  MRN: [id number]  : 1970    Pre-procedure diagnosis: CSF leak  Post-procedure diagnosis: Same    Procedure Date/Time: January 10, 2020  2:08 PM  Procedure: Blood patch L2  Estimated blood loss: None  Specimen(s) collected with description: 20 ml autologous blood  The patient tolerated the procedure well with no immediate complications.  Significant findings:none    See imaging dictation for procedural details.    Provider name: SUZANNE Villareal  Assistant(s):None

## 2020-01-10 NOTE — PROGRESS NOTES
Pt was trendelenburg x 30 minutes post blood patch, now prone and will be for 30 minutes, as per Dr. Martinez's note from 10/31/2019 and as pt notified us of this plan. States left neck/head pain unchanged from pre procedure, and also c/o left low back pain radiating down left leg. Taking sips of water/cola.

## 2020-01-10 NOTE — PROGRESS NOTES
Care Suites Admission Nursing Note    Reason for admission: blood patch  CS arrival time: 1230  Accompanied by:  Flakita  Name/phone of DC : Flakita/cell [phone]  Medications held: none  Consent signed: no  Abnormal assessment/labs: no  If abnormal, provider notified: n/a  Education/questions answered: yes, AVS given to pt  Plan: as planned

## 2020-01-10 NOTE — PROGRESS NOTES
Care Suites Procedure Nursing Note    Procedure: Blood Patch  Procedure completed time: 14:10  Concerns/abnormal assessment: None.  20 mL blood inserted by SUZANNE Jason.  Lower back site CDI.  Plan: Back to Care Suites.  Trendelenberg for thirty minutes then prone for thirty minutes.  Handoff report given to Sierra James RN.

## 2020-01-10 NOTE — PROGRESS NOTES
Care Suites Discharge Nursing Note    Education/questions answered: Yes  Patient DC location: Home  Accompanied by:   CS discharge time: 15:16

## 2020-01-10 NOTE — TELEPHONE ENCOUNTER
Dr. Martinez agreeable to refill pt's tramadol for 30 tablets until upcoming follow up.  Pt updated.     Jessica De Souza, RN, BSN

## 2020-01-13 DIAGNOSIS — K20.0 EOSINOPHILIC ESOPHAGITIS: ICD-10-CM

## 2020-01-13 NOTE — TELEPHONE ENCOUNTER
"omeprazole (PRILOSEC) 40 MG DR capsule      Last Written Prescription Date:  12/13/19  Last Fill Quantity: 30,   # refills: 0  Last Office Visit : 8/8/18  Future Office visit:  3/26/20     Routing refill request to provider for review/approval because:  Cont to fill?  Last visit note \"Called to notify patient that Dr. Griffith would like for her to be on omeprazole until she is able to scope her in October\"         "

## 2020-01-16 RX ORDER — OMEPRAZOLE 40 MG/1
40 CAPSULE, DELAYED RELEASE ORAL 2 TIMES DAILY PRN
Qty: 60 CAPSULE | Refills: 1 | Status: SHIPPED | OUTPATIENT
Start: 2020-01-16 | End: 2020-03-12

## 2020-01-22 ENCOUNTER — TELEPHONE (OUTPATIENT)
Dept: SURGERY | Facility: CLINIC | Age: 50
End: 2020-01-22

## 2020-01-22 PROBLEM — K21.00 GASTROESOPHAGEAL REFLUX DISEASE WITH ESOPHAGITIS: Status: ACTIVE | Noted: 2020-01-22

## 2020-01-22 ASSESSMENT — ANXIETY QUESTIONNAIRES
7. FEELING AFRAID AS IF SOMETHING AWFUL MIGHT HAPPEN: NOT AT ALL
2. NOT BEING ABLE TO STOP OR CONTROL WORRYING: NOT AT ALL
GAD7 TOTAL SCORE: 1
3. WORRYING TOO MUCH ABOUT DIFFERENT THINGS: NOT AT ALL
7. FEELING AFRAID AS IF SOMETHING AWFUL MIGHT HAPPEN: NOT AT ALL
5. BEING SO RESTLESS THAT IT IS HARD TO SIT STILL: NOT AT ALL
4. TROUBLE RELAXING: SEVERAL DAYS
6. BECOMING EASILY ANNOYED OR IRRITABLE: NOT AT ALL
GAD7 TOTAL SCORE: 1
1. FEELING NERVOUS, ANXIOUS, OR ON EDGE: NOT AT ALL

## 2020-01-22 NOTE — TELEPHONE ENCOUNTER
FUTURE VISIT INFORMATION      SURGERY INFORMATION:    Date: 20    Location: [facility] OR    Surgeon:  Alana Mack    Anesthesia Type:  General    Procedure: laparoscopic hiatal hernia repair/ INSERTION, GASTROSTOMY TUBE, LAPAROSCOPY-ASSISTED    Consult: 19- [facility]    RECORDS REQUESTED FROM:       [facility]    Most recent EKG+ Tracin18    Most recent ECHO: 18    Most recent Cardiac Stress Test: 19

## 2020-01-22 NOTE — TELEPHONE ENCOUNTER
Spoke with patient to schedule procedure with Dr. Alana Mack    Procedure was scheduled on 02/05 at [facility] OR  Patient will have H&P with PAC  Patient is aware a / is needed day of surgery.   Surgery letter was sent via [employer], patient has my direct contact information for any further questions.

## 2020-01-23 ENCOUNTER — OFFICE VISIT (OUTPATIENT)
Dept: ANESTHESIOLOGY | Facility: CLINIC | Age: 50
End: 2020-01-23
Payer: COMMERCIAL

## 2020-01-23 VITALS
SYSTOLIC BLOOD PRESSURE: 121 MMHG | DIASTOLIC BLOOD PRESSURE: 82 MMHG | WEIGHT: 188 LBS | HEART RATE: 60 BPM | RESPIRATION RATE: 16 BRPM | BODY MASS INDEX: 28.49 KG/M2 | HEIGHT: 68 IN

## 2020-01-23 DIAGNOSIS — G89.4 CHRONIC PAIN SYNDROME: Primary | ICD-10-CM

## 2020-01-23 DIAGNOSIS — M96.1 POSTLAMINECTOMY SYNDROME: ICD-10-CM

## 2020-01-23 RX ORDER — TRAMADOL HYDROCHLORIDE 50 MG/1
50 TABLET ORAL EVERY 6 HOURS PRN
Qty: 60 TABLET | Refills: 0 | Status: ON HOLD | OUTPATIENT
Start: 2020-01-23 | End: 2020-02-13

## 2020-01-23 RX ORDER — OXYCODONE HYDROCHLORIDE 5 MG/1
2.5 TABLET ORAL EVERY 6 HOURS PRN
Qty: 15 TABLET | Refills: 0 | Status: ON HOLD | OUTPATIENT
Start: 2020-01-23 | End: 2020-02-13

## 2020-01-23 RX ORDER — BUPRENORPHINE 10 UG/H
1 PATCH TRANSDERMAL WEEKLY
Qty: 4 PATCH | Refills: 1 | Status: SHIPPED | OUTPATIENT
Start: 2020-01-25 | End: 2020-04-03

## 2020-01-23 RX ORDER — TRAMADOL HYDROCHLORIDE 50 MG/1
50 TABLET ORAL EVERY 6 HOURS PRN
Qty: 30 TABLET | Refills: 0 | Status: SHIPPED | OUTPATIENT
Start: 2020-01-23 | End: 2020-01-23

## 2020-01-23 ASSESSMENT — MIFFLIN-ST. JEOR: SCORE: 1523.32

## 2020-01-23 ASSESSMENT — PAIN SCALES - GENERAL: PAINLEVEL: SEVERE PAIN (6)

## 2020-01-23 ASSESSMENT — ANXIETY QUESTIONNAIRES: GAD7 TOTAL SCORE: 1

## 2020-01-23 NOTE — PROGRESS NOTES
LPN reviewed AVS with Pt.  Pt verbalized an understanding of information, and was asked to contact clinic with questions.    Follow up recommended by provider: 3-4 months.     Provider asked that LPN call and change the Tramadol to 60 tablets dispensed. Provider is agreeable to 60 tablets every 30 days.     Mya Cash LPN

## 2020-01-23 NOTE — PATIENT INSTRUCTIONS
Medications:    buprenorphine (BUTRANS) 10 MCG/HR WK patch- Place 1 patch onto the skin once a week.    oxyCODONE (ROXICODONE) 5 MG tablet- Take 0.5 tablets (2.5 mg) by mouth every 6 hours as needed for severe pain.   (Must last at least 30 days)     traMADol (ULTRAM) 50 MG tablet- Take 1 tablet (50 mg) by mouth every 6 hours as needed for severe pain.  (Must last at least 30 days)      When calling in for refills for your Opioid Medication- You MUST call (Or MyChart) the clinic DIRECTLY, and at least 7 days before you are needing your Medication refill.      Recommended Follow up:  3-4 months with Dr. Martinez.         To speak with a nurse, schedule/reschedule/cancel a clinic appointment, or request a medication refill call: [phone]     You can also reach us by [employer]: [email or web address]

## 2020-01-23 NOTE — LETTER
1/23/2020       RE: Ada Welch  [address]  [place] [address]     Dear Colleague,    Thank you for referring your patient, Ada Welch, to the [facility] FOR COMPREHENSIVE PAIN MANAGEMENT at [facility]. Please see a copy of my visit note below.    LPN reviewed AVS with Pt.  Pt verbalized an understanding of information, and was asked to contact clinic with questions.    Follow up recommended by provider: 3-4 months.     Provider asked that LPN call and change the Tramadol to 60 tablets dispensed. Provider is agreeable to 60 tablets every 30 days.     Mya Cash LPN      [facility] for Comprehensive Chronic Pain Management : Progress Note    Date of visit: 1/23/2020    Interval history:  Ada Welch is a 49 year old female who  is known to me for chronic low back pain. The patient has very complex history of low back pain for ~25 years for which she underwent L4-S1 fusion (TLIF) 12/9/13.  Postoperatively, her back pain worsened (mainly LBP, but also radiated down both lower limbs in an S1 dermatomal distribution).   Underwent removal of posterior instrumentation (screws and guadalupe).  After fusion removal, her pain continued.  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  She even sought care for her EDS by a specialist in [place],  who she saw twice.      She has a h/o significant autonomic dysfunction.  Since 2014, she reports that her heart rate has fluctuated from as low as  beats per minutes and her blood pressure also fluctuates at the same time without any aggravating factors.  The fluctuation of the blood pressure and heart rate happens when she is supine, sitting, standing, walking, etc.  She had 2 episodes of blacking out for a few seconds.  She has seen cardiologist Dr. Russell who advised her to continue fludrocortisone, isometric exercise, low carbohydrate and low gluten diet.     She has been  "seeing Dr. Singh for urinary urgency and incontinence.  She is advised for improving lifestyle, dietary modification, optimizing bowel regimen and to start pelvic physical therapy.    She has seen Dr. Moncada 2 weeks back for left upper extremity radicular pain along the C8 nerve root distribution.  Per Dr. Moncada's note, she does not have a surgical target which may explain her symptoms.    The patient has been seeing Dr. Noemy Nguyen for chronic migraines. Patient reports that her chronic intractable headache pain recently got worse. 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  She has undergone blood patches intermittently for her headache pain.  She already had the several blood patches at [facility]: It did help some of her headache pain.    The patient has seen Dr. Mack for eosinophilic esophagitis, GERD, and dysphagia.  She underwent EGD which showed impacted food bolus and hiatal hernia.  She is scheduled for open hiatal hernia surgery with Dr. Mack on February 5, 2020.      She has been managing her pain with stable dose of Butrans 10 mcg per hour q 7 days, tramadol 50 mg every 6 hours as needed which she refills every 2-3 months.  In addition she takes tizanidine 4 mg 4 times daily as needed.    She takes sumatriptan for breakthrough migraine.  Occasionally she takes oxycodone 5 mg when her pain gets significantly worse.         [place] Prescription Monitoring Program:   Reviewed. No concerns     Review of Systems:  The 14 system ROS was reviewed and was negative except what is documented above and as follows.  Any bowel or bladder problems: none  Mood: okay    Physical Exam:  Vitals:    01/23/20 1025   BP: 121/82   Pulse: 60   Resp: 16   Weight: 85.3 kg (188 lb)   Height: 1.715 m (5' 7.5\")       General: Awake in no apparent distress.   Eyes: Sclerae are anicteric. PERRLA, EOMI   Neck: supple, no masses.   Lungs: unlabored.   Heart: regular rate and rhythm   Abdomen: soft non " tender.  Extremities: Pulses are well palpable, no peripheral edema.   Musculoskeletal: 5/5 muscle strength in all extremities.   Neurologic exam:Sensation intact throughout all dermatomes bilateral upper extremities and lower extremities  Psychiatric; Normal affect.   Skin: Warm and Dry.    Medications:  Current Outpatient Medications   Medication Sig Dispense Refill     ACETAMINOPHEN PO Take 1,000 mg by mouth every 6 hours as needed for pain Take with tramadol       albuterol (PROAIR HFA/PROVENTIL HFA/VENTOLIN HFA) 108 (90 BASE) MCG/ACT Inhaler Inhale 2 puffs into the lungs every 6 hours as needed        amitriptyline (ELAVIL) 10 MG tablet Take 1 tablet (10 mg) by mouth At Bedtime 60 tablet 1     [START ON 1/25/2020] buprenorphine (BUTRANS) 10 MCG/HR WK patch Place 1 patch onto the skin once a week 4 patch 1     butalbital-acetaminophen-caffeine (FIORICET/ESGIC) -40 MG tablet TAKE ONE TABLET BY MOUTH EVERY 4 HOURS AS NEEDED. MAX 10 PER MONTH. 20 tablet 0     cetirizine (ZYRTEC) 10 MG tablet Take 10 mg by mouth daily        DULoxetine (CYMBALTA) 30 MG capsule Take 60mg in the morning and 30mg at bedtime 270 capsule 3     EPINEPHrine (EPIPEN/ADRENACLICK/OR ANY BX GENERIC EQUIV) 0.3 MG/0.3ML injection 2-pack INJECT 0.3 MLS (0.3 MG) INTO THE MUSCLE AS NEEDED FOR ANAPHYLAXIS  1     famotidine (PEPCID) 20 MG tablet Take 1 tablet (20 mg) by mouth At Bedtime 90 tablet 3     fludrocortisone (FLORINEF) 0.1 MG tablet TAKE 1 TABLET BY MOUTH TWICE A DAY  11     fluticasone (FLONASE) 50 MCG/ACT spray INSTILL 2 SPRAYS INTO BOTH NOSTRILS DAILY 48 mL 3     ivabradine (CORLANOR) 7.5 MG tablet Take 1 tablet (7.5 mg) by mouth 2 times daily (with meals) 60 tablet 3     levothyroxine (SYNTHROID/LEVOTHROID) 25 MCG tablet Take 1 tablet (25 mcg) by mouth daily 90 tablet 2     medical cannabis (Patient's own supply.  Not a prescription) Take 1 Dose by mouth See Admin Instructions (This is NOT a prescription, and does not certify that  the patient has a qualifying medical condition for medical cannabis.  The purpose of this order is  to document that the patient reports taking medical cannabis.)       metoclopramide (REGLAN) 10 MG tablet Take 1 tablet (10 mg) by mouth 4 times daily (before meals and nightly) 40 tablet 3     midodrine (PROAMATINE) 10 MG tablet TAKE 1 TABLET BY MOUTH THREE TIMES A DAY  11     Multiple Vitamins-Minerals (MULTIVITAMIN PO) Take 1 tablet by mouth daily        omeprazole (PRILOSEC) 40 MG DR capsule Take 1 capsule (40 mg) by mouth 2 times daily as needed (prn) 60 capsule 1     ondansetron (ZOFRAN-ODT) 4 MG ODT tab Take 1-2 tablets (4-8 mg) by mouth every 12 hours as needed for nausea 60 tablet 1     oxyCODONE (ROXICODONE) 5 MG tablet Take 0.5 tablets (2.5 mg) by mouth every 6 hours as needed for severe pain 15 tablet 0     Probiotic Product (PROBIOTIC DAILY PO) Take 2 packets by mouth every morning        propranolol ER (INDERAL LA) 120 MG 24 hr capsule Take 1 capsule (120 mg) by mouth daily 30 capsule 11     rizatriptan (MAXALT-MLT) 5 MG ODT TAKE 1-2 TABLETS (5-10 MG) BY MOUTH AT ONSET OF HEADACHE FOR MIGRAINE (MAX 30 MG IN 24 HOURS) 18 tablet 11     SUMAtriptan (IMITREX STATDOSE) 6 MG/0.5ML pen injector kit Inject 0.5 ml (6 mg) subcutaneously at onset of migraine, May repeat in 1 hour , Max 12 mg/24 hrs 2 kit 11     tiZANidine (ZANAFLEX) 4 MG tablet Take 1 tablet (4 mg) by mouth 4 times daily as needed for muscle spasms 360 tablet 3     traMADol (ULTRAM) 50 MG tablet Take 1 tablet (50 mg) by mouth every 6 hours as needed for severe pain 60 tablet 0     vitamin B complex with vitamin C (VITAMIN  B COMPLEX) TABS tablet Take 1 tablet by mouth daily       VITAMIN D, CHOLECALCIFEROL, PO Take 2,000 Units by mouth daily       naloxone (NARCAN) 4 MG/0.1ML nasal spray Spray 1 spray (4 mg) into one nostril alternating nostrils once as needed for opioid reversal every 2-3 minutes until assistance arrives (Patient not taking:  Reported on 1/23/2020) 0.2 mL 0       Analgesic Medications:   Medications related to Pain Management (From now, onward)    Start     Dose/Rate Route Frequency Ordered Stop    01/25/20 0000  buprenorphine (BUTRANS) 10 MCG/HR WK patch      1 patch Transdermal WEEKLY 01/23/20 1057      01/23/20 0000  oxyCODONE (ROXICODONE) 5 MG tablet      2.5 mg Oral EVERY 6 HOURS PRN 01/23/20 1057      01/23/20 0000  traMADol (ULTRAM) 50 MG tablet      50 mg Oral EVERY 6 HOURS PRN 01/23/20 1150               LABORATORY VALUES:   Recent Labs   Lab Test 03/03/19  1904 12/17/18  0920    138   POTASSIUM 3.4 4.2   CHLORIDE 102 107   CO2 25 21   ANIONGAP 10 10   GLC 95 124*   BUN 9 7   CR 0.82 0.71   JUAN 8.6 8.9       CBC RESULTS:   Recent Labs   Lab Test 03/03/19  1904   WBC 8.9   RBC 4.91   HGB 13.7   HCT 41.6   MCV 85   MCH 27.9   MCHC 32.9   RDW 13.7          Most Recent 3 INR's:  Recent Labs   Lab Test 02/28/19  1045 12/17/18  0920 07/06/18  2229   INR 0.97 0.90 0.96           ASSESSMENT:    Chronic pain syndrome  Failed back surgery syndrome  Intractable migraine  Tom-Danlos syndrome  Dysautonomia  Gastroparesis  Urinary urgency and incontinence  Chronic, continuous use of opioid  Intractable chronic migraine without aura and without status migrainosus  Spontaneous intracranial hypotension status post epidural blood patch       PLAN:      1. Medications.   -Continue transdermal Butrans 10 mcg per hour q 7 days earliest refill date of 1/25/2020  -Continue propranolol 120 mg p.o. daily  -Continued Zanaflex 4 mg 4 times daily as needed  -Continue tramadol 50 mg p.o. every 6 hours as needed.  prescription of 60 tablets given today  -Oxycodone 2.5 mg daily as needed.  15 tablets dispensed today.  -Amitriptyline 50 mg nightly  -Cymbalta continued 60 mg AM and 30 mg PM  -Maxalt 5-10 mg p.o. at onset of the headache for migraine (max 30 mg in 24 hours)        2. Interventional procedures:  The patient had post dural puncture  headache after epidural blood patch performed at an outside facility.  Recommend to perform epidural blood patch under fluoroscopic guidance.  If the patient decides to perform epidural blood patch for benign intracranial hypertension, I recommend that she is placed in a Trendelenburg position for 30 minutes after the procedure.  Then she will need to be placed in the prone position for another 30 minutes.      3. Labs and imaging:   -None needed for pain management.      4. Rehab:    -Not discussed today.     5. Psychology:   -No current needs.      6. Disposition:  The patient will follow up in our outpatient clinic in 12 weeks or earlier if clinically indicated.     Assessment will be ongoing with changes in treatment as indicated.  Benefits/risks/alternatives to treatment have been reviewed and the patient has been instructed to contact this office if they have any questions or concerns.  This plan of care has been discussed with the patient and the patient is in agreement.     Faiza Martinez MD, PHD

## 2020-01-23 NOTE — PROGRESS NOTES
[facility] for Comprehensive Chronic Pain Management : Progress Note    Date of visit: 1/23/2020    Interval history:  Ada Welch is a 49 year old female who  is known to me for chronic low back pain. The patient has very complex history of low back pain for ~25 years for which she underwent L4-S1 fusion (TLIF) 12/9/13.  Postoperatively, her back pain worsened (mainly LBP, but also radiated down both lower limbs in an S1 dermatomal distribution).   Underwent removal of posterior instrumentation (screws and guadalupe).  After fusion removal, her pain continued.  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  She even sought care for her EDS by a specialist in [place],  who she saw twice.      She has a h/o significant autonomic dysfunction.  Since 2014, she reports that her heart rate has fluctuated from as low as  beats per minutes and her blood pressure also fluctuates at the same time without any aggravating factors.  The fluctuation of the blood pressure and heart rate happens when she is supine, sitting, standing, walking, etc.  She had 2 episodes of blacking out for a few seconds.  She has seen cardiologist Dr. Russell who advised her to continue fludrocortisone, isometric exercise, low carbohydrate and low gluten diet.     She has been seeing Dr. Singh for urinary urgency and incontinence.  She is advised for improving lifestyle, dietary modification, optimizing bowel regimen and to start pelvic physical therapy.    She has seen Dr. Moncada 2 weeks back for left upper extremity radicular pain along the C8 nerve root distribution.  Per Dr. Moncada's note, she does not have a surgical target which may explain her symptoms.    The patient has been seeing Dr. Noemy Nguyen for chronic migraines. Patient reports that her chronic intractable headache pain recently got worse. 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  She has undergone blood patches  "intermittently for her headache pain.  She already had the several blood patches at [facility]: It did help some of her headache pain.    The patient has seen Dr. Mack for eosinophilic esophagitis, GERD, and dysphagia.  She underwent EGD which showed impacted food bolus and hiatal hernia.  She is scheduled for open hiatal hernia surgery with Dr. Mack on February 5, 2020.      She has been managing her pain with stable dose of Butrans 10 mcg per hour q 7 days, tramadol 50 mg every 6 hours as needed which she refills every 2-3 months.  In addition she takes tizanidine 4 mg 4 times daily as needed.    She takes sumatriptan for breakthrough migraine.  Occasionally she takes oxycodone 5 mg when her pain gets significantly worse.         [place] Prescription Monitoring Program:   Reviewed. No concerns     Review of Systems:  The 14 system ROS was reviewed and was negative except what is documented above and as follows.  Any bowel or bladder problems: none  Mood: okay    Physical Exam:  Vitals:    01/23/20 1025   BP: 121/82   Pulse: 60   Resp: 16   Weight: 85.3 kg (188 lb)   Height: 1.715 m (5' 7.5\")       General: Awake in no apparent distress.   Eyes: Sclerae are anicteric. PERRLA, EOMI   Neck: supple, no masses.   Lungs: unlabored.   Heart: regular rate and rhythm   Abdomen: soft non tender.  Extremities: Pulses are well palpable, no peripheral edema.   Musculoskeletal: 5/5 muscle strength in all extremities.   Neurologic exam:Sensation intact throughout all dermatomes bilateral upper extremities and lower extremities  Psychiatric; Normal affect.   Skin: Warm and Dry.    Medications:  Current Outpatient Medications   Medication Sig Dispense Refill     ACETAMINOPHEN PO Take 1,000 mg by mouth every 6 hours as needed for pain Take with tramadol       albuterol (PROAIR HFA/PROVENTIL HFA/VENTOLIN HFA) 108 (90 BASE) MCG/ACT Inhaler Inhale 2 puffs into the lungs every 6 hours as needed        amitriptyline (ELAVIL) 10 " MG tablet Take 1 tablet (10 mg) by mouth At Bedtime 60 tablet 1     [START ON 1/25/2020] buprenorphine (BUTRANS) 10 MCG/HR WK patch Place 1 patch onto the skin once a week 4 patch 1     butalbital-acetaminophen-caffeine (FIORICET/ESGIC) -40 MG tablet TAKE ONE TABLET BY MOUTH EVERY 4 HOURS AS NEEDED. MAX 10 PER MONTH. 20 tablet 0     cetirizine (ZYRTEC) 10 MG tablet Take 10 mg by mouth daily        DULoxetine (CYMBALTA) 30 MG capsule Take 60mg in the morning and 30mg at bedtime 270 capsule 3     EPINEPHrine (EPIPEN/ADRENACLICK/OR ANY BX GENERIC EQUIV) 0.3 MG/0.3ML injection 2-pack INJECT 0.3 MLS (0.3 MG) INTO THE MUSCLE AS NEEDED FOR ANAPHYLAXIS  1     famotidine (PEPCID) 20 MG tablet Take 1 tablet (20 mg) by mouth At Bedtime 90 tablet 3     fludrocortisone (FLORINEF) 0.1 MG tablet TAKE 1 TABLET BY MOUTH TWICE A DAY  11     fluticasone (FLONASE) 50 MCG/ACT spray INSTILL 2 SPRAYS INTO BOTH NOSTRILS DAILY 48 mL 3     ivabradine (CORLANOR) 7.5 MG tablet Take 1 tablet (7.5 mg) by mouth 2 times daily (with meals) 60 tablet 3     levothyroxine (SYNTHROID/LEVOTHROID) 25 MCG tablet Take 1 tablet (25 mcg) by mouth daily 90 tablet 2     medical cannabis (Patient's own supply.  Not a prescription) Take 1 Dose by mouth See Admin Instructions (This is NOT a prescription, and does not certify that the patient has a qualifying medical condition for medical cannabis.  The purpose of this order is  to document that the patient reports taking medical cannabis.)       metoclopramide (REGLAN) 10 MG tablet Take 1 tablet (10 mg) by mouth 4 times daily (before meals and nightly) 40 tablet 3     midodrine (PROAMATINE) 10 MG tablet TAKE 1 TABLET BY MOUTH THREE TIMES A DAY  11     Multiple Vitamins-Minerals (MULTIVITAMIN PO) Take 1 tablet by mouth daily        omeprazole (PRILOSEC) 40 MG DR capsule Take 1 capsule (40 mg) by mouth 2 times daily as needed (prn) 60 capsule 1     ondansetron (ZOFRAN-ODT) 4 MG ODT tab Take 1-2 tablets (4-8  mg) by mouth every 12 hours as needed for nausea 60 tablet 1     oxyCODONE (ROXICODONE) 5 MG tablet Take 0.5 tablets (2.5 mg) by mouth every 6 hours as needed for severe pain 15 tablet 0     Probiotic Product (PROBIOTIC DAILY PO) Take 2 packets by mouth every morning        propranolol ER (INDERAL LA) 120 MG 24 hr capsule Take 1 capsule (120 mg) by mouth daily 30 capsule 11     rizatriptan (MAXALT-MLT) 5 MG ODT TAKE 1-2 TABLETS (5-10 MG) BY MOUTH AT ONSET OF HEADACHE FOR MIGRAINE (MAX 30 MG IN 24 HOURS) 18 tablet 11     SUMAtriptan (IMITREX STATDOSE) 6 MG/0.5ML pen injector kit Inject 0.5 ml (6 mg) subcutaneously at onset of migraine, May repeat in 1 hour , Max 12 mg/24 hrs 2 kit 11     tiZANidine (ZANAFLEX) 4 MG tablet Take 1 tablet (4 mg) by mouth 4 times daily as needed for muscle spasms 360 tablet 3     traMADol (ULTRAM) 50 MG tablet Take 1 tablet (50 mg) by mouth every 6 hours as needed for severe pain 60 tablet 0     vitamin B complex with vitamin C (VITAMIN  B COMPLEX) TABS tablet Take 1 tablet by mouth daily       VITAMIN D, CHOLECALCIFEROL, PO Take 2,000 Units by mouth daily       naloxone (NARCAN) 4 MG/0.1ML nasal spray Spray 1 spray (4 mg) into one nostril alternating nostrils once as needed for opioid reversal every 2-3 minutes until assistance arrives (Patient not taking: Reported on 1/23/2020) 0.2 mL 0       Analgesic Medications:   Medications related to Pain Management (From now, onward)    Start     Dose/Rate Route Frequency Ordered Stop    01/25/20 0000  buprenorphine (BUTRANS) 10 MCG/HR WK patch      1 patch Transdermal WEEKLY 01/23/20 1057      01/23/20 0000  oxyCODONE (ROXICODONE) 5 MG tablet      2.5 mg Oral EVERY 6 HOURS PRN 01/23/20 1057      01/23/20 0000  traMADol (ULTRAM) 50 MG tablet      50 mg Oral EVERY 6 HOURS PRN 01/23/20 1150               LABORATORY VALUES:   Recent Labs   Lab Test 03/03/19  1904 12/17/18  0920    138   POTASSIUM 3.4 4.2   CHLORIDE 102 107   CO2 25 21    ANIONGAP 10 10   GLC 95 124*   BUN 9 7   CR 0.82 0.71   JUAN 8.6 8.9       CBC RESULTS:   Recent Labs   Lab Test 03/03/19  1904   WBC 8.9   RBC 4.91   HGB 13.7   HCT 41.6   MCV 85   MCH 27.9   MCHC 32.9   RDW 13.7          Most Recent 3 INR's:  Recent Labs   Lab Test 02/28/19  1045 12/17/18  0920 07/06/18  2229   INR 0.97 0.90 0.96           ASSESSMENT:    Chronic pain syndrome  Failed back surgery syndrome  Intractable migraine  Tom-Danlos syndrome  Dysautonomia  Gastroparesis  Urinary urgency and incontinence  Chronic, continuous use of opioid  Intractable chronic migraine without aura and without status migrainosus  Spontaneous intracranial hypotension status post epidural blood patch       PLAN:      1. Medications.   -Continue transdermal Butrans 10 mcg per hour q 7 days earliest refill date of 1/25/2020  -Continue propranolol 120 mg p.o. daily  -Continued Zanaflex 4 mg 4 times daily as needed  -Continue tramadol 50 mg p.o. every 6 hours as needed.  prescription of 60 tablets given today  -Oxycodone 2.5 mg daily as needed.  15 tablets dispensed today.  -Amitriptyline 50 mg nightly  -Cymbalta continued 60 mg AM and 30 mg PM  -Maxalt 5-10 mg p.o. at onset of the headache for migraine (max 30 mg in 24 hours)        2. Interventional procedures:  The patient had post dural puncture headache after epidural blood patch performed at an outside facility.  Recommend to perform epidural blood patch under fluoroscopic guidance.  If the patient decides to perform epidural blood patch for benign intracranial hypertension, I recommend that she is placed in a Trendelenburg position for 30 minutes after the procedure.  Then she will need to be placed in the prone position for another 30 minutes.      3. Labs and imaging:   -None needed for pain management.      4. Rehab:    -Not discussed today.     5. Psychology:   -No current needs.      6. Disposition:  The patient will follow up in our outpatient clinic  in 12 weeks or earlier if clinically indicated.     Assessment will be ongoing with changes in treatment as indicated.  Benefits/risks/alternatives to treatment have been reviewed and the patient has been instructed to contact this office if they have any questions or concerns.  This plan of care has been discussed with the patient and the patient is in agreement.     Faiza Martinez MD, PHD

## 2020-01-24 NOTE — TELEPHONE ENCOUNTER
Spoke with patient to re-schedule procedure with Dr. Alana Mack    Procedure was scheduled on 02/10 at [facility] OR  Patient will have H&P with PAC  Patient is aware a / is needed day of surgery.   Surgery letter was sent via [employer], patient has my direct contact information for any further questions.

## 2020-01-27 ENCOUNTER — NURSE TRIAGE (OUTPATIENT)
Dept: NURSING | Facility: CLINIC | Age: 50
End: 2020-01-27

## 2020-01-27 NOTE — TELEPHONE ENCOUNTER
[facility]: Nurse Triage Note  SITUATION/BACKGROUND                                                      Ada Welch is a 49 year old female who calls with headache-felt like she was going to black out this morning...    Description:  Left sided headache,ears began to ring,dizzy and shaking-sat down and symptoms slowly improved       Onset/duration:  This morning  Precip. factors:  Chronic migraines  Associated symptoms:  weakness  Improves/worsens symptoms:  Improved slowly with rest   Still feels weak   Pain scale (0-10)   6/10    MEDICATIONS:   Taking medication(s) as prescribed? Yes  Taking over the counter medication(s?) No  Any barriers to taking medication(s) as prescribed?  No  Medication(s) improving/managing symptoms?  No    Allergies:   Allergies   Allergen Reactions     Bee Venom Anaphylaxis, Difficulty breathing, Palpitations, Shortness Of Breath and Visual Disturbance     Patient does carry Epi-Pen     No Clinical Screening - See Comments Anaphylaxis     Chicken-Derived Products (Egg) Diarrhea and Nausea     Patient will self monitor  Other reaction(s): Nausea  Patient will self monitor  Other reaction(s): GI intolerance     Compazine [Prochlorperazine] Other (See Comments)     Extreme jittery     Food      Milk     Gabapentin      Gluten Meal Other (See Comments)     Pregabalin Other (See Comments)     Seasonal Allergies Unknown     Dust, mold     Topiramate      Wheat Diarrhea     Wheat Bran Nausea     Patient will self monitor  Other reaction(s): Nausea  Patient will self monitor       ASSESSMENT      49 year old female with chronic migraines-calls in today to cancel her appointment with Dr. Barahona because she does not feel well and is unable to drive.  She felt like she was going to pass out this morning.  Call center requested triaging to assess situation...  Ada states she has chronic headache on her left side.  She rates them 6/10.  She states she woke up with her  headache this morning which is her norm for the last 6 weeks.  She was helping her son get breakfast and her ears began to ring,she began to shake and became dizzy. Experienced tunnel vision.  She was going to black out which she has done in the past so she sat down and the symptoms improved.  She still has her left sided headache-her left ear is ringing slightly and she feels weak.  She states she is not up to driving to her appointment today.  The shaking and dizziness has improved.  She denies double or blurred vision or unsteady gait.     Instructed her to take her pain medications, rest in a dark room,and apply cool compress the her forehead every 2 hours.  Encouraged to seek medical care if symptoms worsen   She has made a follow-up appointment with Dr. Barahona         RECOMMENDATION/PLAN                                                      RECOMMENDED DISPOSITION:  Home care advice - see above  Will comply with recommendation: Yes    If further questions/concerns or if symptoms do not improve, worsen or new symptoms develop, call your PCP or [phone] to talk with the Resident on call, as soon as possible.    Guideline used: headache  Telephone Triage Protocols for Nurses, Fifth Edition, Carla Bennett, RN, RN

## 2020-01-28 ENCOUNTER — OFFICE VISIT (OUTPATIENT)
Dept: SURGERY | Facility: CLINIC | Age: 50
End: 2020-01-28
Payer: COMMERCIAL

## 2020-01-28 ENCOUNTER — ANESTHESIA EVENT (OUTPATIENT)
Dept: SURGERY | Facility: CLINIC | Age: 50
DRG: 326 | End: 2020-01-28
Payer: COMMERCIAL

## 2020-01-28 ENCOUNTER — TELEPHONE (OUTPATIENT)
Dept: SURGERY | Facility: CLINIC | Age: 50
End: 2020-01-28

## 2020-01-28 ENCOUNTER — OFFICE VISIT (OUTPATIENT)
Dept: OTOLARYNGOLOGY | Facility: CLINIC | Age: 50
End: 2020-01-28
Payer: COMMERCIAL

## 2020-01-28 ENCOUNTER — PRE VISIT (OUTPATIENT)
Dept: SURGERY | Facility: CLINIC | Age: 50
End: 2020-01-28

## 2020-01-28 VITALS
TEMPERATURE: 97.4 F | OXYGEN SATURATION: 100 % | HEART RATE: 57 BPM | RESPIRATION RATE: 19 BRPM | SYSTOLIC BLOOD PRESSURE: 104 MMHG | BODY MASS INDEX: 29.51 KG/M2 | HEIGHT: 67 IN | WEIGHT: 188 LBS | DIASTOLIC BLOOD PRESSURE: 71 MMHG

## 2020-01-28 VITALS — WEIGHT: 188 LBS | BODY MASS INDEX: 29.51 KG/M2 | HEIGHT: 67 IN

## 2020-01-28 DIAGNOSIS — K21.00 GERD WITH ESOPHAGITIS: ICD-10-CM

## 2020-01-28 DIAGNOSIS — Z01.818 PRE-OP EXAMINATION: Primary | ICD-10-CM

## 2020-01-28 DIAGNOSIS — R79.1 ELEVATED FACTOR VIII LEVEL: ICD-10-CM

## 2020-01-28 DIAGNOSIS — K44.9 HIATAL HERNIA: ICD-10-CM

## 2020-01-28 DIAGNOSIS — J31.0 CHRONIC RHINITIS: Primary | ICD-10-CM

## 2020-01-28 DIAGNOSIS — Z01.818 PREOP EXAMINATION: Primary | ICD-10-CM

## 2020-01-28 LAB
ANION GAP SERPL CALCULATED.3IONS-SCNC: 4 MMOL/L (ref 3–14)
BUN SERPL-MCNC: 10 MG/DL (ref 7–30)
CALCIUM SERPL-MCNC: 8.8 MG/DL (ref 8.5–10.1)
CHLORIDE SERPL-SCNC: 106 MMOL/L (ref 94–109)
CO2 SERPL-SCNC: 28 MMOL/L (ref 20–32)
CREAT SERPL-MCNC: 0.7 MG/DL (ref 0.52–1.04)
ERYTHROCYTE [DISTWIDTH] IN BLOOD BY AUTOMATED COUNT: 14.8 % (ref 10–15)
GFR SERPL CREATININE-BSD FRML MDRD: >90 ML/MIN/{1.73_M2}
GLUCOSE SERPL-MCNC: 96 MG/DL (ref 70–99)
HCT VFR BLD AUTO: 38.5 % (ref 35–47)
HGB BLD-MCNC: 12.1 G/DL (ref 11.7–15.7)
MCH RBC QN AUTO: 27.1 PG (ref 26.5–33)
MCHC RBC AUTO-ENTMCNC: 31.4 G/DL (ref 31.5–36.5)
MCV RBC AUTO: 86 FL (ref 78–100)
PLATELET # BLD AUTO: 271 10E9/L (ref 150–450)
POTASSIUM SERPL-SCNC: 5 MMOL/L (ref 3.4–5.3)
RBC # BLD AUTO: 4.46 10E12/L (ref 3.8–5.2)
SODIUM SERPL-SCNC: 138 MMOL/L (ref 133–144)
WBC # BLD AUTO: 9 10E9/L (ref 4–11)

## 2020-01-28 RX ORDER — NAPROXEN SODIUM 220 MG
440-660 TABLET ORAL DAILY PRN
COMMUNITY
End: 2023-09-26

## 2020-01-28 RX ORDER — FLUTICASONE PROPIONATE 50 MCG
SPRAY, SUSPENSION (ML) NASAL
Qty: 48 ML | Refills: 3 | Status: SHIPPED | OUTPATIENT
Start: 2020-01-28 | End: 2021-02-22

## 2020-01-28 ASSESSMENT — LIFESTYLE VARIABLES: TOBACCO_USE: 1

## 2020-01-28 ASSESSMENT — PAIN SCALES - GENERAL
PAINLEVEL: SEVERE PAIN (6)
PAINLEVEL: NO PAIN (0)

## 2020-01-28 ASSESSMENT — MIFFLIN-ST. JEOR
SCORE: 1515.39
SCORE: 1515.39

## 2020-01-28 NOTE — NURSING NOTE
"Chief Complaint   Patient presents with     RECHECK     Chronic sinusitis     Height 1.702 m (5' 7\"), weight 85.3 kg (188 lb), not currently breastfeeding.    Bruna Dickens, EMT  "

## 2020-01-28 NOTE — ADDENDUM NOTE
Addendum  created 01/28/20 0597 by Anamaria Sun PA-C    Clinical Note Signed, Diagnosis association updated, Order list changed, Visit diagnoses modified

## 2020-01-28 NOTE — PROGRESS NOTES
Preoperative Assessment Center medication history for January 28, 2020 is complete.    See Epic admission navigator for prior to admission medications.   Operating room staff will still need to confirm medications and last dose information on day of surgery.     Medication history interview sources:  patient, payor information.     Changes made to PTA medication list (reason)  Added: aleve,   Deleted: none  Changed: amitriptyline, cetirizine, prilosec,     Additional medication history information (including reliability of information, actions taken by pharmacist):    -- No recent (within 30 days) course of antibiotics  -- Did have a one time dose in walk in clinic of oral steroid for ear pain and fluid in the ear.   -- No recent (within 30 days) chronic daily medications stopped   -- Patient declines being on any other prescription or over-the-counter medications    Prior to Admission medications    Medication Sig Last Dose Taking? Auth Provider   ACETAMINOPHEN PO Take 1,000 mg by mouth every 6 hours as needed for pain Take with tramadol Taking Yes Unknown, Entered By History   albuterol (PROAIR HFA/PROVENTIL HFA/VENTOLIN HFA) 108 (90 BASE) MCG/ACT Inhaler Inhale 2 puffs into the lungs every 6 hours as needed  Taking Yes Reported, Patient   amitriptyline (ELAVIL) 10 MG tablet Take 1 tablet (10 mg) by mouth At Bedtime  Patient taking differently: Take 10 mg by mouth nightly as needed for sleep  Taking Yes Faiza Martinez MD   buprenorphine (BUTRANS) 10 MCG/HR WK patch Place 1 patch onto the skin once a week Taking Yes Faiza Martinez MD   butalbital-acetaminophen-caffeine (FIORICET/ESGIC) -40 MG tablet TAKE ONE TABLET BY MOUTH EVERY 4 HOURS AS NEEDED. MAX 10 PER MONTH. Taking Yes Noemy Nguyen MD   cetirizine (ZYRTEC) 10 MG tablet Take 10 mg by mouth daily as needed  Taking Yes Reported, Patient   DULoxetine (CYMBALTA) 30 MG capsule Take 60mg in the morning and 30mg at bedtime Taking Yes Juan  Faiza OLIVEIRA MD   EPINEPHrine (EPIPEN/ADRENACLICK/OR ANY BX GENERIC EQUIV) 0.3 MG/0.3ML injection 2-pack INJECT 0.3 MLS (0.3 MG) INTO THE MUSCLE AS NEEDED FOR ANAPHYLAXIS Taking Yes Reported, Patient   famotidine (PEPCID) 20 MG tablet Take 1 tablet (20 mg) by mouth At Bedtime Taking Yes Ellyn Rivera MD   fluticasone (FLONASE) 50 MCG/ACT spray INSTILL 2 SPRAYS INTO BOTH NOSTRILS DAILY Taking Yes Ellyn Rivera MD   ivabradine (CORLANOR) 7.5 MG tablet Take 1 tablet (7.5 mg) by mouth 2 times daily (with meals) Taking Yes Kirk Russell MD   levothyroxine (SYNTHROID/LEVOTHROID) 25 MCG tablet Take 1 tablet (25 mcg) by mouth daily Taking Yes Shilpa Galicia MD   medical cannabis (Patient's own supply.  Not a prescription) Take 1 Dose by mouth See Admin Instructions (This is NOT a prescription, and does not certify that the patient has a qualifying medical condition for medical cannabis.  The purpose of this order is  to document that the patient reports taking medical cannabis.)     Capsule formulation - uses as needed (currently out of this and not using as of January 28, 2020) Taking Yes Unknown, Entered By History   metoclopramide (REGLAN) 10 MG tablet Take 1 tablet (10 mg) by mouth 4 times daily (before meals and nightly) Taking Yes Noemy Nguyen MD   Multiple Vitamins-Minerals (MULTIVITAMIN PO) Take 1 tablet by mouth daily  Taking Yes Reported, Patient   naproxen sodium (ANAPROX) 220 MG tablet Take 440-660 mg by mouth daily as needed for moderate pain Taking Yes Unknown, Entered By History   omeprazole (PRILOSEC) 40 MG DR capsule Take 1 capsule (40 mg) by mouth 2 times daily as needed (prn)  Patient taking differently: Take 40 mg by mouth 2 times daily  Taking Yes Juana Griffith MD   ondansetron (ZOFRAN-ODT) 4 MG ODT tab Take 1-2 tablets (4-8 mg) by mouth every 12 hours as needed for nausea Taking Yes Ellyn Rivera MD   oxyCODONE (ROXICODONE) 5 MG tablet Take 0.5 tablets  (2.5 mg) by mouth every 6 hours as needed for severe pain Taking Yes Faiza Martinez MD   propranolol ER (INDERAL LA) 120 MG 24 hr capsule Take 1 capsule (120 mg) by mouth daily Taking Yes Noemy Nguyen MD   rizatriptan (MAXALT-MLT) 5 MG ODT TAKE 1-2 TABLETS (5-10 MG) BY MOUTH AT ONSET OF HEADACHE FOR MIGRAINE (MAX 30 MG IN 24 HOURS) Taking Yes Noemy Nguyen MD   SUMAtriptan (IMITREX STATDOSE) 6 MG/0.5ML pen injector kit Inject 0.5 ml (6 mg) subcutaneously at onset of migraine, May repeat in 1 hour , Max 12 mg/24 hrs Taking Yes Noemy Nguyen MD   tiZANidine (ZANAFLEX) 4 MG tablet Take 1 tablet (4 mg) by mouth 4 times daily as needed for muscle spasms Taking Yes Faiza Martinez MD   traMADol (ULTRAM) 50 MG tablet Take 1 tablet (50 mg) by mouth every 6 hours as needed for severe pain Taking Yes Faiza Martinez MD   vitamin B complex with vitamin C (VITAMIN  B COMPLEX) TABS tablet Take 1 tablet by mouth daily Taking Yes Reported, Patient   VITAMIN D, CHOLECALCIFEROL, PO Take 2,000 Units by mouth daily Taking Yes Unknown, Entered By History   fludrocortisone (FLORINEF) 0.1 MG tablet TAKE 1 TABLET BY MOUTH TWICE A DAY Not Taking  Reported, Patient   midodrine (PROAMATINE) 10 MG tablet TAKE 1 TABLET BY MOUTH THREE TIMES A DAY Not Taking  Reported, Patient   naloxone (NARCAN) 4 MG/0.1ML nasal spray Spray 1 spray (4 mg) into one nostril alternating nostrils once as needed for opioid reversal every 2-3 minutes until assistance arrives  Patient not taking: Reported on 1/23/2020 Not Taking  Faiza Martinez MD   Probiotic Product (PROBIOTIC DAILY PO) Take 2 packets by mouth every morning  Not Taking  Reported, Patient        Medication history completed by: Cooper King [facility]

## 2020-01-28 NOTE — LETTER
2020     RE: Ada Welch  [address]  [place] [address]     Dear Colleague,    Thank you for referring your patient, Ada Welch, to the [facility] EAR NOSE AND THROAT at [facility]. Please see a copy of my visit note below.  Otolaryngology Clinic    Name: Ada Welch  MRN: [id number]  Age: 49 year old  : 1970      Chief Complaint:   Follow up      History of Present Illness:   Ada Welch is a 49 year old female who presents for follow up of chronic sinusitis.  Last seen in clinic 2017.  She returns today with concerns for constant nasal drainage.  If she leans forward, she has active drainage from her nose.  At times she does have some pressure in the area of her frontal sinuses.  She did see an allergist and had allergy testing which did not identify any specific allergen which might be causing her symptoms.    She also had pain behind her left ear and up into her temple area for a couple weeks.  She did have ringing in her ear but no change in her hearing.  She went to urgent care and was prescribed steroid drops for a middle ear effusion.  Those symptoms have now resolved.       Review of Systems:   Pertinent items are noted in HPI or as in patient entered ROS below, remainder of complete ROS is negative.    ENT ROS 2020   Constitutional Weight loss, Appetite change, Unexplained fatigue, Problems with sleep   Neurology Dizzy spells, Numbness, Unexplained weakness, Headache   Eyes Visual loss, Double vision   Ears, Nose, Throat Hearing loss, Ear pain, Ringing/noise in ears, Nasal congestion or drainage, Sore throat, Trouble swallowing, Hoarseness   Cardiopulmonary Breathing problems, Chest pain   Gastrointestinal/Genitourinary Heartburn/indigestion, Pain with urination, Constipation, Diarrhea   Musculoskeletal Sore or stiff joints, Swollen joints, Back pain, Neck pain, Swollen legs/feet   Allergy/Immunology Allergies  "or hay fever, Skin changes   Hematologic Bleeding problems, Easy bruising, Lymph node swelling   Endocrine Thirst, Heat or cold intolerance, Frequent urination   Skin -   Other -         Physical Exam:   Ht 1.702 m (5' 7\")   Wt 85.3 kg (188 lb)   BMI 29.44 kg/m        General Assessment   The patient is alert, oriented and in no acute distress.   Head/Face/Scalp  Grossly normal.   Ears  Normal canals, auricles and tympanic membranes.   Nose  External nose is straight, skin is normal. Intranasal exam (anterior rhinoscopy) reveals normal moist mucosa, turbinate tissue without edema, erythema or crusting.    Mouth  Oral cavity shows healthy mucosa with out ulceration, masses or other lesions involving the tongue, palate, buccal mucosa, floor of mouth or gingiva.  Dentition in good repair.  Oropharynx with normal tonsils, posterior wall and base of tongue. TMJ click on the left.  Neck  No significant adenopathy, thyroid or salivary gland abnormality.     Procedure:  Endoscopy indicated to examine posterior nasal passages for pathology  Topical anesthetic/decongestant spray applied.  Rigid scope used for visualization.  Findings:   Right: Slight septal deviation to the right anteriorly.  Thick clear secretions from the sinuses.  No polyps.  Mild inflammation.   Left: Thick clear secretions in the middle meatus.  No polyps.  Mild inflammation.     Assessment and Plan:  Ada Welch is a 49 year old female with a history of nasal polyps and chronic sinusitis here for follow up.  No new polyps or other pathology on exam.  Recommend nasal steroid spray to help decrease secretions, ,can use saline rinses as needed.    Follow-up: Return if symptoms worsen or fail to improve.         Scribe Disclosure:  I, Ellyn Lanier, am serving as a scribe to document services personally performed by Zoe Barahona MD at this visit, based upon the provider's statements to me. All documentation has been reviewed by the aforementioned " provider prior to being entered into the official medical record.    The documentation recorded by the scribe accurately reflects the services I personally performed and the decisions made by me.  Zoe Barahona MD

## 2020-01-28 NOTE — PATIENT INSTRUCTIONS
Preparing for Your Surgery      Name:  Ada Welch   MRN:  [id number]   :  1970   Today's Date:  2020     Arriving for surgery:  Surgery date:  2/10/20  Arrival time:  5:30 am  Please come to:       [facility] [address]  [address] [place]  [address]    - ? parking is available in front of the hospital      -    Please proceed to [address] on the 3rd floor. [phone]?     - ?If you are in need of directions, wheelchair or escort please stop at the Information Desk in the lobby.  Inform the information person that you are here for surgery; a wheelchair and escort to [address] will be provided.?     What can I eat or drink?  -  You may have solid food or milk products until 8 hours prior to your surgery (11:30 pm).  -  You may have water, apple juice or 7up/Sprite until 2 hours prior to your surgery (5:30 am).    Which medicines can I take?  Stop Aspirin, vitamins and supplements one week prior to surgery.  Hold Ibuprofen for 24 hours and/or Naproxen for 48 hours prior to surgery.   Hold Cannabis 24 hours prior to surgery.    -  Please take these medications the day of surgery:  Acetaminophen (Tylenol)  Albuterol (Proair) Inhaler  Cetirizine (Zyrtec)  Fludrocortisone (Florinf)  Fluticasone (Flonase)  Ivabradine (Corlanor)  Levothyroxine (Synthroid)  Metoclopramide (Reglan)  Midodrine (Proamatine)  Omeprazole (Prilosec)  Ondansetron (Zofran)  Oxycodone (Roxicodone)  Propranolol (Inderal)  Tizanidine (Zanaflex)  Tramadol (Ultram)    How do I prepare myself?  -  Take two showers: one the night before surgery; and one the morning of surgery.         Use Scrubcare or Hibiclens to wash from neck down, leave soap on your skin for up to one minute.  Do not get soap in your eyes or ears.  You may use your own shampoo and conditioner; no other hair products.   -  Do NOT use lotion, powder, deodorant, or antiperspirant the day of your surgery.  -  Do NOT wear  any makeup, fingernail polish or jewelry.  -  Do not bring your own medications to the hospital, except for inhalers.  -  Bring your ID and insurance card.    Questions or Concerns:  -If you are scheduled on the East or West campus and have questions or concerns regarding the day of surgery, please call Preadmission Nursing at [phone].     -If you have health changes between today and your surgery please call your surgeon. For questions after surgery please call your surgeons office.     AFTER YOUR SURGERY  Breathing exercises   Breathing exercises help you recover faster. Take deep breaths and let the air out slowly. This will:     Help you wake up after surgery.    Help prevent complications like pneumonia.  Preventing complications will help you go home sooner.   We may give you a breathing device (incentive spirometer) to encourage you to breathe deeply.   Nausea and vomiting   You may feel sick to your stomach after surgery; if so, let your nurse know.    Pain control:  After surgery, you may have pain. Our goal is to help you manage your pain. Pain medicine will help you feel comfortable enough to do activities that will help you heal.  These activities may include breathing exercises, walking and physical therapy.   To help your health care team treat your pain we will ask: 1) If you have pain  2) where it is located 3) describe your pain in your words  Methods of pain control include medications given by mouth, vein or by nerve block for some surgeries.  Sequential Compression Device (SCD):  You may need to wear SCD S (also called pneumo boots)on your legs or feet. These are wraps connected to a machine that pumps in air and releases it. The repeated pumping helps prevent blood clots from forming.

## 2020-01-28 NOTE — H&P
Pre-Operative H & P     CC:  Preoperative exam to assess for increased cardiopulmonary risk while undergoing surgery and anesthesia.    Date of Encounter: 1/28/2020  Primary Care Physician:  Ellyn Rivera     Reason for visit: pre operative examination, GERD with esophagitis    HPI  Ada Welch is a 49 year old female who presents for pre-operative H & P in preparation for laparoscopic hiatal hernia repair, INSERTION, GASTROSTOMY TUBE, LAPAROSCOPY-ASSISTED  with Dr. Mack on 2/10/20 at [facility].     Ada is a 49-year-old woman who has a past medical history significant for ear Tom Danlos syndrome, orthostatic hypotension, autonomic dysfunction, former smoker, postnasal drip, high factor VIII, migraines, Chiari malformation, spontaneous intracranial hypotension, hypothyroidism, overweight, GERD, urinary urgency and incontinence, chronic pain, cervicalgia, anxiety and depression.  She has continued to have issues with a large hiatal hernia with underlying GERD and eosinophilic esophagitis. The patient has dysphagia symptoms and she takes reglan as well as pepcid, prilosec and zofran. She met with Dr. Mack on 11/14/19 to discuss her surgical options. She is now scheduled for the procedure as above.     History is obtained from the patient and chart review    Past Medical History  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     Cervicalgia      Chronic sinusitis 00/2007    Diagnosed with chronic pansinusitis 2016     CSF leak     Chiari malformation     Depression      Tom-Danlos disease      Eosinophilic esophagitis 08/2018     Gastroesophageal reflux  disease 3/1/2017    I have large hiatal hernia     Hiatal hernia 2016    Have adeline hiatel hernia     Hoarseness Unsure    It comes and goes     IBS (irritable bowel syndrome) with constipation     improved on Linzess     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     Migraines 1/1/2007    Unsure of exact date     Nasal polyps 2013    Were revoved 03/2017, benign     Orthostatic hypotension      Other nervous system complications 1/2014    Autonomic nervous system disorder, neuralgia, neuropathy     Swelling, mass, or lump in head and neck 08/2016    Lymph node has been growing for last year     Thyroid disease 01/01/2008     Urinary incontinence      Urinary urgency        Past Surgical History  Past Surgical History:   Procedure Laterality Date     AS REPAIR OF NASAL SEPTUM  2009    repair broke nose     BACK SURGERY  12/09/2013  10/01/2014    Spinal fusion L4-S1, L5 moving 5/8ths in. Remove screws/rods     BIOPSY  01/01/2008 & 3/2017    mole biopsy, lymph node biopsy     COLONOSCOPY  3/2017    Colonoscopy and GI     ESOPHAGEAL IMPEDENCE FUNCTION TEST WITH 24 HOUR PH GREATER THAN 1 HOUR N/A 9/17/2019    Procedure: IMPEDANCE PH STUDY, ESOPHAGUS, 24 HOUR;  Surgeon: Raghavendra Grande MD;  Location:  GI     ESOPHAGOSCOPY, GASTROSCOPY, DUODENOSCOPY (EGD), COMBINED N/A 8/3/2018    Procedure: COMBINED ESOPHAGOSCOPY, GASTROSCOPY, DUODENOSCOPY (EGD), BIOPSY SINGLE OR MULTIPLE;;  Surgeon: Juan Woodson MD;  Location:  GI     ESOPHAGOSCOPY, GASTROSCOPY, DUODENOSCOPY (EGD), COMBINED N/A 10/22/2018    Procedure: COMBINED ESOPHAGOSCOPY, GASTROSCOPY, DUODENOSCOPY (EGD), BIOPSY SINGLE OR MULTIPLE;  Surgeon: Sadia Carolina MD;  Location:  GI     ESOPHAGOSCOPY, GASTROSCOPY, DUODENOSCOPY (EGD), COMBINED N/A 12/17/2018    Procedure: COMBINED ESOPHAGOSCOPY, GASTROSCOPY, DUODENOSCOPY (EGD);  Surgeon: Juan Woodson MD;  Location:  GI     NASAL/SINUS POLYPECTOMY  2017    Polyps were benign      NOSE SURGERY  2007    deviated septum     TONSILLECTOMY & ADENOIDECTOMY  1981     TUBAL LIGATION  07/01/2011       Hx of Blood transfusions/reactions: denies     Hx of abnormal bleeding or anti-platelet use: none    Menstrual history: Patient's last menstrual period was 12/23/2019 (approximate).:     Steroid use in the last year: none    Personal or FH with difficulty with Anesthesia:  Patient reports that vertigo after extension of her neck during intubation. She has has issues with getting bilateral pain relief after epidural.     Prior to Admission Medications  Current Outpatient Medications   Medication Sig Dispense Refill     ACETAMINOPHEN PO Take 1,000 mg by mouth every 6 hours as needed for pain Take with tramadol       albuterol (PROAIR HFA/PROVENTIL HFA/VENTOLIN HFA) 108 (90 BASE) MCG/ACT Inhaler Inhale 2 puffs into the lungs every 6 hours as needed        amitriptyline (ELAVIL) 10 MG tablet Take 1 tablet (10 mg) by mouth At Bedtime (Patient taking differently: Take 10 mg by mouth nightly as needed for sleep ) 60 tablet 1     buprenorphine (BUTRANS) 10 MCG/HR WK patch Place 1 patch onto the skin once a week 4 patch 1     butalbital-acetaminophen-caffeine (FIORICET/ESGIC) -40 MG tablet TAKE ONE TABLET BY MOUTH EVERY 4 HOURS AS NEEDED. MAX 10 PER MONTH. 20 tablet 0     cetirizine (ZYRTEC) 10 MG tablet Take 10 mg by mouth daily as needed        DULoxetine (CYMBALTA) 30 MG capsule Take 60mg in the morning and 30mg at bedtime 270 capsule 3     EPINEPHrine (EPIPEN/ADRENACLICK/OR ANY BX GENERIC EQUIV) 0.3 MG/0.3ML injection 2-pack INJECT 0.3 MLS (0.3 MG) INTO THE MUSCLE AS NEEDED FOR ANAPHYLAXIS  1     famotidine (PEPCID) 20 MG tablet Take 1 tablet (20 mg) by mouth At Bedtime 90 tablet 3     fludrocortisone (FLORINEF) 0.1 MG tablet TAKE 1 TABLET BY MOUTH TWICE A DAY  11     fluticasone (FLONASE) 50 MCG/ACT nasal spray INSTILL 2 SPRAYS INTO BOTH NOSTRILS DAILY 48 mL 3     ivabradine (CORLANOR) 7.5 MG tablet  Take 1 tablet (7.5 mg) by mouth 2 times daily (with meals) 60 tablet 3     levothyroxine (SYNTHROID/LEVOTHROID) 25 MCG tablet Take 1 tablet (25 mcg) by mouth daily 90 tablet 2     medical cannabis (Patient's own supply.  Not a prescription) Take 1 Dose by mouth See Admin Instructions (This is NOT a prescription, and does not certify that the patient has a qualifying medical condition for medical cannabis.  The purpose of this order is  to document that the patient reports taking medical cannabis.)     Capsule formulation - uses as needed (currently out of this and not using as of January 28, 2020)       metoclopramide (REGLAN) 10 MG tablet Take 1 tablet (10 mg) by mouth 4 times daily (before meals and nightly) 40 tablet 3     midodrine (PROAMATINE) 10 MG tablet TAKE 1 TABLET BY MOUTH THREE TIMES A DAY  11     Multiple Vitamins-Minerals (MULTIVITAMIN PO) Take 1 tablet by mouth daily        naloxone (NARCAN) 4 MG/0.1ML nasal spray Spray 1 spray (4 mg) into one nostril alternating nostrils once as needed for opioid reversal every 2-3 minutes until assistance arrives (Patient not taking: Reported on 1/23/2020) 0.2 mL 0     naproxen sodium (ANAPROX) 220 MG tablet Take 440-660 mg by mouth daily as needed for moderate pain       omeprazole (PRILOSEC) 40 MG DR capsule Take 1 capsule (40 mg) by mouth 2 times daily as needed (prn) (Patient taking differently: Take 40 mg by mouth 2 times daily ) 60 capsule 1     ondansetron (ZOFRAN-ODT) 4 MG ODT tab Take 1-2 tablets (4-8 mg) by mouth every 12 hours as needed for nausea 60 tablet 1     oxyCODONE (ROXICODONE) 5 MG tablet Take 0.5 tablets (2.5 mg) by mouth every 6 hours as needed for severe pain 15 tablet 0     Probiotic Product (PROBIOTIC DAILY PO) Take 2 packets by mouth every morning        propranolol ER (INDERAL LA) 120 MG 24 hr capsule Take 1 capsule (120 mg) by mouth daily 30 capsule 11     rizatriptan (MAXALT-MLT) 5 MG ODT TAKE 1-2 TABLETS (5-10 MG) BY MOUTH AT ONSET OF  HEADACHE FOR MIGRAINE (MAX 30 MG IN 24 HOURS) 18 tablet 11     SUMAtriptan (IMITREX STATDOSE) 6 MG/0.5ML pen injector kit Inject 0.5 ml (6 mg) subcutaneously at onset of migraine, May repeat in 1 hour , Max 12 mg/24 hrs 2 kit 11     tiZANidine (ZANAFLEX) 4 MG tablet Take 1 tablet (4 mg) by mouth 4 times daily as needed for muscle spasms 360 tablet 3     traMADol (ULTRAM) 50 MG tablet Take 1 tablet (50 mg) by mouth every 6 hours as needed for severe pain 60 tablet 0     vitamin B complex with vitamin C (VITAMIN  B COMPLEX) TABS tablet Take 1 tablet by mouth daily       VITAMIN D, CHOLECALCIFEROL, PO Take 2,000 Units by mouth daily         Allergies  Allergies   Allergen Reactions     Bee Venom Anaphylaxis, Difficulty breathing, Palpitations, Shortness Of Breath and Visual Disturbance     Patient does carry Epi-Pen     No Clinical Screening - See Comments Anaphylaxis     Chicken-Derived Products (Egg) Diarrhea and Nausea     Patient will self monitor  Other reaction(s): Nausea  Patient will self monitor  Other reaction(s): GI intolerance     Compazine [Prochlorperazine] Other (See Comments)     Extreme jittery     Food      Milk     Gabapentin      Doesn't tolerate - unsure of reaction     Gluten Meal Other (See Comments)     Pregabalin Other (See Comments)     Doesn't tolerate - unsure of reaction     Seasonal Allergies Unknown     Dust, mold     Topiramate      Wheat Diarrhea     Wheat Bran Nausea     Patient will self monitor  Other reaction(s): Nausea  Patient will self monitor       Social History  Social History     Socioeconomic History     Marital status:      Spouse name: Carry     Number of children: 5     Years of education: Not on file     Highest education level: Not on file   Occupational History     Not on file   Social Needs     Financial resource strain: Not on file     Food insecurity:     Worry: Not on file     Inability: Not on file     Transportation needs:     Medical: Not on file      Non-medical: Not on file   Tobacco Use     Smoking status: Former Smoker     Packs/day: 0.20     Years: 10.00     Pack years: 2.00     Types: Cigarettes     Start date: 1991     Last attempt to quit: 2013     Years since quittin.1     Smokeless tobacco: Never Used   Substance and Sexual Activity     Alcohol use: Yes     Comment: occasional - 4 times a year      Drug use: Not Currently     Types: Marijuana     Comment: medical marijuana     Sexual activity: Yes     Partners: Male     Birth control/protection: Female Surgical   Lifestyle     Physical activity:     Days per week: Not on file     Minutes per session: Not on file     Stress: Not on file   Relationships     Social connections:     Talks on phone: Not on file     Gets together: Not on file     Attends [employer] service: Not on file     Active member of club or organization: Not on file     Attends meetings of clubs or organizations: Not on file     Relationship status: Not on file     Intimate partner violence:     Fear of current or ex partner: Not on file     Emotionally abused: Not on file     Physically abused: Not on file     Forced sexual activity: Not on file   Other Topics Concern     Parent/sibling w/ CABG, MI or angioplasty before 65F 55M? No   Social History Narrative    5 kids: 26, 25, 21, 15, 8 Brinn yo     works at [employer] for disability        5 yo and 6 months grandchildren Marcelo and Kynayelieverette       Family History  Family History   Problem Relation Age of Onset     Connective Tissue Disorder Mother         eds     Connective Tissue Disorder Sister         eds     Cancer Father         Spinal tumor grew into spinal cord, went in brain     Migraines Father      Diabetes Maternal Grandmother         Elderly diabetes     Heart Disease Maternal Grandmother      Hypertension Maternal Grandmother      Diabetes Paternal Grandmother         Elderly diabetes     Diabetes Paternal Grandfather         Elderly  "diabetes     Asthma Sister         Pediatric Asthma     Migraines Sister      Asthma Son         Pediatric Asthma     Thyroid Disease Sister         ,     Migraines Sister      Migraines Sister      Breast Cancer No family hx of        Review of Systems    ROS/MED HX    ENT/Pulmonary:     (+)other ENT- post nasal drip , tobacco use (2 year pack history ), Past use 0.2 packs/day  , . .    Neurologic:     (+)migraines, other neuro CSF leak, chiari malformation, sponteanous intracranial hypotension    Cardiovascular: Comment: Autonomic dysfunction, orthostatic hypotension     (+) ----. : . . . :. . Previous cardiac testing Echodate:6/7/18results:Stress Testdate:9/27/19 results:ECG reviewed date:12/17/18 results: date: results:          METS/Exercise Tolerance:  4 - Raking leaves, gardening   Hematologic:     (+) Other Hematologic Disorder-high factor VIII      (-) History of Transfusion   Musculoskeletal:   (+)  other musculoskeletal- cervicalgia       GI/Hepatic:     (+) GERD Asymptomatic on medication, hiatal hernia,       Renal/Genitourinary: Comment: Urinary incontinence, urinary urgency         Endo:     (+) Obesity, .      Psychiatric:     (+) psychiatric history anxiety and depression      Infectious Disease:  - neg infectious disease ROS       Malignancy:      - no malignancy   Other: Comment: Tom-danlos syndrome, mast cell activation   (+) H/O Chronic Pain,H/O chronic opiod use ,          The complete review of systems is negative other than noted in the HPI or here.   Temp: 97.4  F (36.3  C) Temp src: Oral BP: 104/71 Pulse: 57   Resp: 19 SpO2: 100 %         188 lbs 0 oz  5' 7\"   Body mass index is 29.44 kg/m .       Physical Exam  Constitutional: Awake, alert, cooperative, no apparent distress, and appears stated age.  Eyes: Pupils equal, round and reactive to light, extra ocular muscles intact, sclera clear, conjunctiva normal.  HENT: Normocephalic, oral pharynx with moist mucus membranes, good dentition. " No goiter appreciated.   Respiratory: Clear to auscultation bilaterally, no crackles or wheezing.  Cardiovascular: Regular rate and rhythm, normal S1 and S2, and no murmur noted.  Carotids +2, no bruits. No edema. Palpable pulses to radial  DP and PT arteries.   GI: Normal bowel sounds, soft, non-distended, non-tender, no masses palpated, no hepatosplenomegaly.    Lymph/Hematologic: No cervical lymphadenopathy and no supraclavicular lymphadenopathy.  Genitourinary:  defer  Skin: Warm and dry.  No rashes at anticipated surgical site.   Musculoskeletal: Limited ROM of neck. There is no redness, warmth, or swelling of the joints. Gross motor strength is normal.    Neurologic: Awake, alert, oriented to name, place and time. Cranial nerves II-XII are grossly intact. Gait is normal.   Neuropsychiatric: Calm, cooperative. Normal affect.     Labs: (personally reviewed)  Results for LALI HENAO (MRN [id number]) as of 1/28/2020 14:06   Ref. Range 1/28/2020 12:31   Sodium Latest Ref Range: 133 - 144 mmol/L 138   Potassium Latest Ref Range: 3.4 - 5.3 mmol/L 5.0   Chloride Latest Ref Range: 94 - 109 mmol/L 106   Carbon Dioxide Latest Ref Range: 20 - 32 mmol/L 28   Urea Nitrogen Latest Ref Range: 7 - 30 mg/dL 10   Creatinine Latest Ref Range: 0.52 - 1.04 mg/dL 0.70   GFR Estimate Latest Ref Range: >60 mL/min/1.73_m2 >90   GFR Estimate If Black Latest Ref Range: >60 mL/min/1.73_m2 >90   Calcium Latest Ref Range: 8.5 - 10.1 mg/dL 8.8   Anion Gap Latest Ref Range: 3 - 14 mmol/L 4   Glucose Latest Ref Range: 70 - 99 mg/dL 96   WBC Latest Ref Range: 4.0 - 11.0 10e9/L 9.0   Hemoglobin Latest Ref Range: 11.7 - 15.7 g/dL 12.1   Hematocrit Latest Ref Range: 35.0 - 47.0 % 38.5   Platelet Count Latest Ref Range: 150 - 450 10e9/L 271   RBC Count Latest Ref Range: 3.8 - 5.2 10e12/L 4.46   MCV Latest Ref Range: 78 - 100 fl 86   MCH Latest Ref Range: 26.5 - 33.0 pg 27.1   MCHC Latest Ref Range: 31.5 - 36.5 g/dL 31.4 (L)   RDW Latest Ref  Range: 10.0 - 15.0 % 14.8     EKG 12/17/18  Sinus rhythm with sinus arrhythmia    Echo 6/7/18  Interpretation Summary  Left ventricular function, chamber size, wall motion, and wall thickness are  normal.The EF is 60-65%.  Normal right ventricular size and systolic function.  No significant valve disease.    NM lexiscan stress test 9/27/19  Impression:  1. Normal myocardial SPECT study with a summed stress score of 0.  2. No evidence of ischemia or scar.  3. Normal LV wall motion and ejection fraction.      Cardiac event monitor 10/2/19-10/15/19      MRI brain without and with contrast  MRA of the head without contrast  Neck MRA without and with contrast  Impression:  1. Brain MRI: No findings to explain patient's symptoms. No  significant change since 6/2/2018.  2. Stable cerebellar tonsillar ectopia.  3. Stable minimal nonspecific cerebral white matter T2  hyperintensities.  4. Normal head and neck MRA.  The patient's records and results personally reviewed by this provider.     MR CERVICAL SPINE W/O CONTRAST, MR THORACIC SPINE W/O CONTRAST, MR  LUMBAR SPINE W/O CONTRAST 11/2/2019 8:33 AM  Impression:   1. Cervical spine: Multilevel degenerative disease with neural  foraminal narrowings without spinal canal stenosis.Prominent central  canal at the level of C4-C5, similar to 7/24/2017.   2. Thoracic spine: No spinal canal stenosis or neural foraminal  narrowing.  3. Lumbar spine: Postsurgical changes at the level of L4-L5 and L5-S1  with interbody spacer. Greater than expected edema in the posterior  paraspinous soft tissues at L4-5 and L5-S1, with no discrete fluid  collection identified. While this could be postoperative, an occult  CSF leak in this location is possible.    MR CERVICAL SPINE W/O CONTRAST, MR THORACIC SPINE W/O CONTRAST, MR  LUMBAR SPINE W/O CONTRAST 11/2/2019 8:33 AM  Impression:   1. Cervical spine: Multilevel degenerative disease with neural  foraminal narrowings without spinal canal  stenosis.Prominent central  canal at the level of C4-C5, similar to 7/24/2017.   2. Thoracic spine: No spinal canal stenosis or neural foraminal  narrowing.  3. Lumbar spine: Postsurgical changes at the level of L4-L5 and L5-S1  with interbody spacer. Greater than expected edema in the posterior  paraspinous soft tissues at L4-5 and L5-S1, with no discrete fluid  collection identified. While this could be postoperative, an occult  CSF leak in this location is possible.      MR CERVICAL SPINE W/O CONTRAST, MR THORACIC SPINE W/O CONTRAST, MR  LUMBAR SPINE W/O CONTRAST 11/2/2019 8:33 AM  Impression:   1. Cervical spine: Multilevel degenerative disease with neural  foraminal narrowings without spinal canal stenosis.Prominent central  canal at the level of C4-C5, similar to 7/24/2017.   2. Thoracic spine: No spinal canal stenosis or neural foraminal  narrowing.  3. Lumbar spine: Postsurgical changes at the level of L4-L5 and L5-S1  with interbody spacer. Greater than expected edema in the posterior  paraspinous soft tissues at L4-5 and L5-S1, with no discrete fluid  collection identified. While this could be postoperative, an occult  CSF leak in this location is possible.    Outside records reviewed from: care everywhere     ASSESSMENT and PLAN  Scarlett is a 49 year old woman who is scheduled for laparoscopic hiatal hernia repair (N/A Abdomen) INSERTION, GASTROSTOMY TUBE, LAPAROSCOPY-ASSISTED  on 2/10/20 by Dr. Mack in treatment of GERD with esophagitis.  PAC referral for risk assessment and optimization for anesthesia with comorbid conditions of orthostatic hypotension, autonomic dysfunction, former smoker, post nasal drip, high factor VIII, migraines, spontaneous intracranial hypotension, chiari malformation, overweight, urinary incontinence and urgency, colette-danlos syndrome, mast cell activation, chronic pain, cervicalgia, anxiety, depression:    Pre-operative considerations:  1.  Cardiac:  Functional status- METS 4,  the patient does household chores such as vacuuming. She goes up several flights of stairs and walks.  Intermediate risk surgery with 0.9% (RCRI #) risk of major adverse cardiac event. The patient is followed by Dr. Russell for her orthostatic hypotension and autonomic dysfunction. She reports that she has no correlation for her heart rate. She will range from . She has found that that ivabradine has helped level things out for her. She continues on propranolol. She has has comprehensive previous cardiac testing as above. She denies any new symptoms or changes in her activity. No further cardiac testing indicated.     2.  Pulm:  Airway feasible.  ABHINAV risk: Low  ~ Former smoker - quit in 2013, 2 year pack history.   ~ Post nasal drip - the patient is not using albuterol or zyrtec currently. She is seeing ENT after our appointment. She denies any recent colds.     3. Heme:  The patient has a history of high factor VIII. She has seen hematology, Dr. Leach in the past for this. The last time in 2018. Per that note her recommendations are as follows:   ASSESSMENT AND PLAN:     1.  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  At this point, if the patient undergoes any surgical intervention given that she has had documented high factor VIII levels in physiological settings, I would recommend putting her on anticoagulation postop for at least 10-14 days depending on the kind of surgery.  I had a detailed discussion with the patient regarding the nature of anticoagulation using direct oral anticoagulant agents, like  rivaroxaban or apixaban versus using parenteral anticoagulant agents like Lovenox at a prophylactic-intensity 40 mg and the patient chooses to go with Lovenox as she states that in past oral absorption for her for different medications has been patchy.  She will call our center once the surgery is scheduled, so that we can send a prescription for Lovenox.     Will  reach out to Dr. Leach for any recommendations and update Dr. Mack.     [place] back from Dr. Leach who felt it would be helpful to see the patient for post op planning for this procedure given it's been over a year since she was seen. Will make referral.     4. Neuro: The patient is followed by neurologist, Dr. Nguyen for migraines, spontaneous intracranial hypotension and chiari malformation - she is being managed with epidural blood patch, last one on 1/10/20 and Imitrex and Maxalt. She finds that Fioricet helps her a lot but her insurance currently is not covering this mediation.     5. Endo: Overweight, BMI 29. Consideration for safe lifting techniques.   ~ Hypothyroidism - continue levothyroxine.     6. GI:  Risk of PONV score = 3.  If > 2, anti-emetic intervention recommended.  ~ GERD with esophagitis - the patient at baseline has nausea. She will continue prilosec, pepcid, reglan and zofran.     7. : urinary urgency/incontinence - the patient is followed by Dr. Singh who has recommended physical therapy. The patient currently denies any UTI symptoms.     8. Psych: Anxiety, depression - the patient will continue Cymbalta and amitriptyline.     9. Musculoskeletal: Chronic pain - the patient is seen by Dr. Martinez. She will continue on tramadol, Butrans, zanaflex and oxycodone. She was seen by PharmD today. Please see their note for post op pain plan.   ~ Cervicalgia - the patient is followed by Dr. Moncada and no surgical intervention indicated as this time.   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 She will consideration for careful positioning to minimize discomfort.     10. Anesthesia: The patient reports issues with vertigo after GA when her neck was stretched too much. She last had general anesthesia and video laryngoscope was used. She does have chiari malformation so would  recommend close monitoring of excessive neck extension. Final decision to be made by anesthesiologist the day of surgery.       VTE risk: 0.26%      Patient was discussed with Dr Andino.    The patient is optimized for their procedure. AVS with information on surgery time/arrival time, meds and NPO status given by nursing staff.        Anamaria Sun PA-C  Preoperative Assessment Center  [facility]  [facility]  Phone: [phone]  Fax: [phone]

## 2020-01-28 NOTE — PATIENT INSTRUCTIONS
Thank You for choosing [facility]. You were seen in the ENT Clinic today.     has recommended the followin. Follow up as needed.  2. Refill of flonase, continue using as prescribed.      If you have any questions or concerns after your appointment, please  Call [phone].

## 2020-01-28 NOTE — PHARMACY - PREOPERATIVE ASSESSMENT CENTER
PREOPERATIVE PAIN CONSULT FOR POSTOPERATIVE PAIN MANAGEMENT  Ada Welch was seen and interviewed during PAC Clinic appointment on January 28, 2020 in preparation for the planned procedure with Dr. Mack on 2/10/20 at the [facility] for laparoscopic hiatal hernia repair, INSERTION, GASTROSTOMY TUBE, LAPAROSCOPY-ASSISTED.  These recommendations are intended for patients admitted to the hospital after a procedure and are only valid for 30 days from the date of service. If there are significant changes in opioid dosing between today and day of procedure the below recommendations may have to be adjusted.      RECOMMENDATIONS:   The following pain management recommendations are made based on information from today's visit and should not replace medical decision-making based on patient condition at the time of procedure or postoperatively.      - PREOPERATIVE:  + Long acting opioid - Butrans patch 10 mcg/hr - replace 1 patch every Saturday. Take usual dose on the morning of procedure and wear to the hospital.  + Before procedure recommend acetaminophen 975 mg PO in pre-op     - INTRAOPERATIVE (Anesthesiologist/CRNA to consider):   + Regional anesthesia - Defer to RAPS team   + Avoid remifentanil - to reduce risk of developing hyperalgesia    - POSTOPERATIVE MANAGEMENT:  Place inpatient pain management consult to assist with acute postoperative pain management.    Opioid analgesic:  + Note: if neuraxial opioid or other long acting opioid (eg. Methadone) is given during procedure contact on-call pain provider for adjustment in opioid plan  + Note if regional approach includes an opioid via the epidural then defer opioid management to RAPS team.     + Continue Butrans patch 10 mcg/hr - replace 1 patch every Saturday. Continue from home per pain provider Dr. Martinez.   + Start HYDROmorphone (Dilaudid) PCA dose range 0.2-0.4 mg Q 10 min lockout interval with NO continuous rate.  Start with 0.2 mg  PCA dose and increase dose by 0.1 mg every 1 hour as necessary for pain control or improvement in physical function, hold or reduce dose for sedation. Notify provider to assess for uncontrolled pain or sedation and adjust dose as necessary.          -- Hold other PO and IV opioids while on PCA    Nonopioid analgesics:   + Defer use of NSAID to surgeon. If able, would start ketorolac 15 mg IV every 6 hr scheduled   + Start acetaminophen 975 mg PO/FT every 6 hr scheduled if no concern about masking fevers  + Resume prior to admission medications related to pain when able to take PO or enteral meds: duloxetine   + Initiate lidocaine 4% patch - 1-3 patches every 24 hours scheduled (12 hr on 12 hr off). Apply to intact skin only.     Muscle Relaxant:   + Start tizanidine 2-4 mg PO or per FT q6hr PRN muscle spasms (note this is a home medication), hold for oversedation.     Stool softeners/Laxatives:   + When appropriate start senna-docusate 1-2 tabs PO/FT BID and Miralax 17 g PO/FT daily to prevent opioid induced constipation.     Other:  + Recommend close monitoring of respiratory status postoperatively with capnography and continuous SpO2 monitoring. Would recommend continuing capnography beyond the usual 24 hr due to patient's opioid tolerance.     -------------------------------------------------------------------------------------------------------------------  - OUTPATIENT MEDICATIONS (related to pain management):  -- Long-acting opioid: Butrans patch 10 mcg/hr - replace 1 patch every Saturday   -- Short-acting opioid: tramadol 50 mg PO q6hr pRN (usually takes 2 doses/day), oxycodone 2.5 mg PO q6hr PRN (uses sparingly)  -- Oral adjuvant(s): acetaminophen PRN, tizanidine 4 mg PO QID PRN, Fioricet 1 tab q4hr PRN  -- Topicals: none   -- Other relevant medications: amitriptyline 10 mg PO at bedtime prn sleep    Outpatient opioids prescribed by Dr. Faiza Martinez [facility])    ASSESSMENT:    Ada Welch has a history of chronic low back pain ~25 years with previous L4S1 fusion TLIF on 12/9/13 with worsening back pain after OR and subsequent removal of posterior insturmentation. Pain persisted after this and she also has joint pains in her shoulders hips knees and hands secondary to her colette danlos.  Of note she also has a chiari malformation (no surgical history on this), autonomic dysfunction (BP and HR fluctuations), urinary urgency/incontenince, chronic migrianes and hiatial hernia for which she is preparing for above mentioned surgery.   Today she reports pain is primarily her chronic back pain but has flares of her EDS pain intermittently. She has been working with Dr. Martinez for the past couple years and has dramatically improved her functional capacity over that time. She endorses doing well previously with surgery while maintaining on butrans patch and discussed this with Dr. Martinez and plans to stay on for upcoming procedure.  She denies any issues with sedation, constipation or other ADR with the butrans or opioids.  She does not know if she has ABHINAV.  She was supposed to have a sleep study done but has not had that done.  She endorses EtOH use but very rarely, she denies any recreational drugs, did previously have medical cannabis but not using now d/t sleepiness. She denies any issues with opioid abuse/misuse/diversion.  She is a former smoker and quit in 2013.  She is open to a multimodal pain management plan as outlined above.     Other pain therapies tried in the past (not an all inclusive list):   PCA - patient has received  PCA in the past without complication  Dilaudid - works very well postoperatively and would like to try PO dilaudid when transitioning off the PCA.   Gabapentin - did not tolerate, unsure of rxn  Pregabalin - did not tolerate, unsure of rxn.     Pain therapies never tried (not an all inclusive list):   Ketamine - has issues with  autono dysfunction, spontaneous intracranial hypotension and non surgically corrected chiari malformation so would initially avoid ketamine and attempt to manage pain with other means first.     The inpatient pain service will follow up on patient after consult is placed and offer further recommendations for pain management.  If immediate assistance is needed please contact the pain service at the number below.     [facility]  January 28, 2020  10:56 AM    If questions or concerns, please contact the Inpatient Pain Management Service:  Call [phone] after hours, weekends and holidays.   Page [phone] from 8 AM - 3 PM Mon - Fri.

## 2020-01-28 NOTE — ANESTHESIA PREPROCEDURE EVALUATION
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     Chronic sinusitis     Diagnosed with chronic pansinusitis      Chronic tonsillitis     Had tonsils removed 1981, rheumatic fever     Tom-Danlos disease      Eosinophilic esophagitis 2018     Gastroesophageal reflux disease 3/1/2017    I have large hiatal hernia     Hernia, abdominal 10/2016    Hiatal Hernia     Hiatal hernia     Have adeline hiatel hernia     Hoarseness Unsure    It comes and goes     IBS (irritable bowel syndrome) with constipation     improved on Linzess     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 Remove screws/rods     BIOPSY  2008 & 3/2017    mole biopsy, lymph node biopsy     COLONOSCOPY  3/2017    Colonoscopy and  GI     ESOPHAGEAL IMPEDENCE FUNCTION TEST WITH 24 HOUR PH GREATER THAN 1 HOUR N/A 9/17/2019    Procedure: IMPEDANCE PH STUDY, ESOPHAGUS, 24 HOUR;  Surgeon: Raghavendra Grande MD;  Location:  GI     ESOPHAGOSCOPY, GASTROSCOPY, DUODENOSCOPY (EGD), COMBINED N/A 8/3/2018    Procedure: COMBINED ESOPHAGOSCOPY, GASTROSCOPY, DUODENOSCOPY (EGD), BIOPSY SINGLE OR MULTIPLE;;  Surgeon: Juan Woodson MD;  Location:  GI     ESOPHAGOSCOPY, GASTROSCOPY, DUODENOSCOPY (EGD), COMBINED N/A 10/22/2018    Procedure: COMBINED ESOPHAGOSCOPY, GASTROSCOPY, DUODENOSCOPY (EGD), BIOPSY SINGLE OR MULTIPLE;  Surgeon: Sadia Carolina MD;  Location:  GI     ESOPHAGOSCOPY, GASTROSCOPY, DUODENOSCOPY (EGD), COMBINED N/A 12/17/2018    Procedure: COMBINED ESOPHAGOSCOPY, GASTROSCOPY, DUODENOSCOPY (EGD);  Surgeon: Juan Woodson MD;  Location:  GI     NASAL/SINUS POLYPECTOMY  2017    Polyps were benign     TONSILLECTOMY       TONSILLECTOMY  1981     TUBAL LIGATION            Anesthesia Evaluation     . Pt has had prior anesthetic. Type: General and MAC    History of anesthetic complications    The patient reports issues with vertigo after intubation in the past when her neck was extended more greatly in the past. She also has issues with having bilateral pain relief from epidurals in the past      ROS/MED HX    ENT/Pulmonary:     (+)other ENT- post nasal drip , tobacco use (2 year pack history ), Past use 0.2 packs/day  , . .    Neurologic:     (+)migraines, other neuro CSF leak, chiari malformation, sponteanous intracranial hypotension    Cardiovascular: Comment: Autonomic dysfunction, orthostatic hypotension     (+) ----. : . . . :. . Previous cardiac testing [place]:6/7/18results:Stress Testdate:9/27/19 results:ECG reviewed date:12/17/18 results: date: results:          METS/Exercise Tolerance:  4 - Raking leaves, gardening   Hematologic:     (+) Other Hematologic Disorder-high factor VIII      (-) History of Transfusion    Musculoskeletal:   (+)  other musculoskeletal- cervicalgia       GI/Hepatic:     (+) GERD Asymptomatic on medication, hiatal hernia,       Renal/Genitourinary: Comment: Urinary incontinence, urinary urgency         Endo:     (+) thyroid problem hypothyroidism, Obesity, .      Psychiatric:     (+) psychiatric history anxiety and depression      Infectious Disease:  - neg infectious disease ROS       Malignancy:      - no malignancy   Other: Comment: Tom-danlos syndrome, mast cell activation   (+) Possibly pregnant LMP: 12/23/19, H/O Chronic Pain,H/O chronic opiod use ,                        PHYSICAL EXAM:   Mental Status/Neuro: A/A/O; Age Appropriate   Airway: Facies: Feasible (mildly small MO)  Mallampati: I  Mouth/Opening: Full  TM distance: > 6 cm  Neck ROM: Full   Respiratory: Auscultation: CTAB     Resp. Rate: Normal     Resp. Effort: Normal      CV: Rhythm: Regular  Heart: Normal Sounds  Edema: None  Pulses: Normal   Comments:                      Results for LALI HENAO (MRN [id number]) as of 1/28/2020 14:06   Ref. Range 1/28/2020 12:31   Sodium Latest Ref Range: 133 - 144 mmol/L 138   Potassium Latest Ref Range: 3.4 - 5.3 mmol/L 5.0   Chloride Latest Ref Range: 94 - 109 mmol/L 106   Carbon Dioxide Latest Ref Range: 20 - 32 mmol/L 28   Urea Nitrogen Latest Ref Range: 7 - 30 mg/dL 10   Creatinine Latest Ref Range: 0.52 - 1.04 mg/dL 0.70   GFR Estimate Latest Ref Range: >60 mL/min/1.73_m2 >90   GFR Estimate If Black Latest Ref Range: >60 mL/min/1.73_m2 >90   Calcium Latest Ref Range: 8.5 - 10.1 mg/dL 8.8   Anion Gap Latest Ref Range: 3 - 14 mmol/L 4   Glucose Latest Ref Range: 70 - 99 mg/dL 96   WBC Latest Ref Range: 4.0 - 11.0 10e9/L 9.0   Hemoglobin Latest Ref Range: 11.7 - 15.7 g/dL 12.1   Hematocrit Latest Ref Range: 35.0 - 47.0 % 38.5   Platelet Count Latest Ref Range: 150 - 450 10e9/L 271   RBC Count Latest Ref Range: 3.8 - 5.2 10e12/L 4.46   MCV Latest Ref Range: 78 - 100 fl 86   MCH Latest Ref  "Range: 26.5 - 33.0 pg 27.1   MCHC Latest Ref Range: 31.5 - 36.5 g/dL 31.4 (L)   RDW Latest Ref Range: 10.0 - 15.0 % 14.8     Preop Vitals    BP Readings from Last 3 Encounters:   01/28/20 104/71   01/23/20 121/82   01/10/20 124/85    Pulse Readings from Last 3 Encounters:   01/28/20 57   01/23/20 60   01/10/20 50      Resp Readings from Last 3 Encounters:   01/28/20 19   01/23/20 16   01/10/20 16    SpO2 Readings from Last 3 Encounters:   01/28/20 100%   01/10/20 98%   01/03/20 97%      Temp Readings from Last 1 Encounters:   01/28/20 97.4  F (36.3  C) (Oral)    Ht Readings from Last 1 Encounters:   01/28/20 1.702 m (5' 7\")      Wt Readings from Last 1 Encounters:   01/28/20 85.3 kg (188 lb)    Estimated body mass index is 29.44 kg/m  as calculated from the following:    Height as of this encounter: 1.702 m (5' 7\").    Weight as of this encounter: 85.3 kg (188 lb).     LDA:        Assessment:   ASA SCORE: 3    H&P: History and physical reviewed and following examination; no interval change.    NPO Status: NPO Appropriate     Plan:   Anes. Type:  General   Pre-Medication: Midazolam   Induction:  IV (RSI)   Airway: ETT; Oral; CMAC/VL (will keep neck neutral given issues with neck extension previously.)   Access/Monitoring: PIV; 2nd PIV   Maintenance: Balanced     Postop Plan:   Postop Pain: Opioids (APAP preop, no gabapentin (has intolerance), RAPS contact regarding potential postoperative TAP block.  Not ordered by surgeon, and midzolam given before written consent could be obtained.  Also recommend PCA and NSAIDs if possible postoperatively.  )  Postop Sedation/Airway: Not planned  Disposition: Inpatient/Admit     PONV Management:   Adult Risk Factors: Female, Postop Opioids   Prevention: Ondansetron, Dexamethasone     CONSENT: Direct conversation   Plan and risks discussed with: Patient          Comments for Plan/Consent:  ______________________________________________________________________  [facility] 40[facility] q[facility] - discussed with Dr. Mack.  I discussed the risks and benefits of general anesthesia with the patient, specifically including the risk of inadequate analgesia given her buprenorphine use, but HM and oxycodone have been effective in the past.  Continuing buprenorphone perioperatively.  Questions were sought and answered.      Norris Jones MD  Attending Anesthesiologist                PAC Discussion and Assessment    ASA Classification: 3  Case is suitable for: [place]  Anesthetic techniques and relevant risks discussed: GA  Invasive monitoring and risk discussed:   Types:   Possibility and Risk of blood transfusion discussed:   NPO instructions given:   Additional anesthetic preparation and risks discussed:   Needs early admission to pre-op area:   Other:     PAC Resident/NP Anesthesia Assessment:  Tif is a 49 year old woman who is scheduled for laparoscopic hiatal hernia repair (N/A Abdomen) INSERTION, GASTROSTOMY TUBE, LAPAROSCOPY-ASSISTED  on 2/10/20 by Dr. Mack in treatment of GERD with esophagitis.  PAC referral for risk assessment and optimization for anesthesia with comorbid conditions of orthostatic hypotension, autonomic dysfunction, former smoker, post nasal drip, high factor VIII, migraines, spontaneous intracranial hypotension, chiari malformation, overweight, urinary incontinence and urgency, colette-danlos syndrome, mast cell activation, chronic pain, cervicalgia, anxiety, depression:    Pre-operative considerations:  1.  Cardiac:  Functional status- METS 4, the patient does household chores such as vacuuming. She goes up several flights of stairs and walks.  Intermediate risk surgery with 0.9% (RCRI #) risk of major adverse cardiac event. The patient is followed by Dr. Russell for her orthostatic hypotension and autonomic dysfunction. She reports that she has no correlation for her heart rate. She will range from . She has found that that ivabradine has helped level  things out for her. She continues on propranolol. She has has comprehensive previous cardiac testing as above. She denies any new symptoms or changes in her activity. No further cardiac testing indicated.     2.  Pulm:  Airway feasible.  ABHINAV risk: Low  ~ Former smoker - quit in 2013, 2 year pack history.   ~ Post nasal drip - the patient is not using albuterol or zyrtec currently. She is seeing ENT after our appointment. She denies any recent colds.     3. Heme:  The patient has a history of high factor VIII. She has seen hematology, Dr. Leach in the past for this. The last time in 2018. Per that note her recommendations are as follows:   ASSESSMENT AND PLAN:     1.  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  At this point, if the patient undergoes any surgical intervention given that she has had documented high factor VIII levels in physiological settings, I would recommend putting her on anticoagulation postop for at least 10-14 days depending on the kind of surgery.  I had a detailed discussion with the patient regarding the nature of anticoagulation using direct oral anticoagulant agents, like  rivaroxaban or apixaban versus using parenteral anticoagulant agents like Lovenox at a prophylactic-intensity 40 mg and the patient chooses to go with Lovenox as she states that in past oral absorption for her for different medications has been patchy.  She will call our center once the surgery is scheduled, so that we can send a prescription for Lovenox.     Will reach out to Dr. Leach for any recommendations and update Dr. Mack.     4. Neuro: The patient is followed by neurologist, Dr. Nguyen for migraines, spontaneous intracranial hypotension and chiari malformation - she is being managed with epidural blood patch, last one on 1/10/20 and Imitrex and Maxalt. She finds that Fioricet helps her a lot but her insurance currently is not covering this mediation.     5. Endo:  Overweight, BMI 29. Consideration for safe lifting techniques.   ~ Hypothyroidism - continue levothyroxine.     6. GI:  Risk of PONV score = 3.  If > 2, anti-emetic intervention recommended.  ~ GERD with esophagitis - the patient at baseline has nausea. She will continue prilosec, pepcid, reglan and zofran.     7. : urinary urgency/incontinence - the patient is followed by Dr. Singh who has recommended physical therapy. The patient currently denies any UTI symptoms.     8. Psych: Anxiety, depression - the patient will continue Cymbalta and amitriptyline.     9. Musculoskeletal: Chronic pain - the patient is seen by Dr. Martinez. She will continue on tramadol, Butrans, zanaflex and oxycodone. She was seen by PharmD today. Please see their note for post op pain plan.   ~ Cervicalgia - the patient is followed by Dr. Moncada and no surgical intervention indicated as this time.   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 She will consideration for careful positioning to minimize discomfort.     10. Anesthesia: The patient reports issues with vertigo after GA when her neck was stretched too much. She last had general anesthesia and video laryngoscope was used. She does have chiari malformation so would recommend close monitoring of excessive neck extension. Final decision to be made by anesthesiologist the day of surgery.       VTE risk: 0.26%    Patient is optimized and is acceptable candidate for the proposed procedure.  No further diagnostic evaluation is needed.     Patient discussed with Dr Andino.     For further details of assessment, testing, and physical exam please see H and P completed on same date.    Anamaria Andrews PA-C    ADDENDUM: [place] from Dr. Leach who felt it would be helpful to see the patient for any post op planning given it's been over a year since she was seen. Will  place referral.           Mid-Level Provider/Resident: Anamaria Sun PA-C  Date: 1/28/20  Time:     Attending Anesthesiologist Anesthesia Assessment:  Patient discussed with the nurse clinician. The history of Chiari malformation lends itself to limiting neck extension during DL/ET tube placement. Accordingly, I would recommend the use of a videolaryngoscope.    Reviewed and Signed by PAC Anesthesiologist  Anesthesiologist: Sina  Date: 1/28/20  Time:   Pass/Fail:   Disposition:     PAC Pharmacist Assessment:  PREOPERATIVE PAIN CONSULT FOR POSTOPERATIVE PAIN MANAGEMENT  Ada Welch was seen and interviewed during PAC Clinic appointment on January 28, 2020 in preparation for the planned procedure with Dr. Mack on 2/10/20 at the [facility] for laparoscopic hiatal hernia repair, INSERTION, GASTROSTOMY TUBE, LAPAROSCOPY-ASSISTED.  These recommendations are intended for patients admitted to the hospital after a procedure and are only valid for 30 days from the date of service. If there are significant changes in opioid dosing between today and day of procedure the below recommendations may have to be adjusted.      RECOMMENDATIONS:   The following pain management recommendations are made based on information from today's visit and should not replace medical decision-making based on patient condition at the time of procedure or postoperatively.      - PREOPERATIVE:  + Long acting opioid - Butrans patch 10 mcg/hr - replace 1 patch every Saturday. Take usual dose on the morning of procedure and wear to the hospital.  + Before procedure recommend acetaminophen 975 mg PO in pre-op     - INTRAOPERATIVE (Anesthesiologist/CRNA to consider):   + Regional anesthesia - Defer to RAPS team   + Avoid remifentanil - to reduce risk of developing hyperalgesia    - POSTOPERATIVE MANAGEMENT:  Place inpatient pain management consult to assist with acute postoperative pain management.    Opioid  analgesic:  + Note: if neuraxial opioid or other long acting opioid (eg. Methadone) is given during procedure contact on-call pain provider for adjustment in opioid plan  + Note if regional approach includes an opioid via the epidural then defer opioid management to RAPS team.     + Continue Butrans patch 10 mcg/hr - replace 1 patch every Saturday. Continue from home per pain provider Dr. Martinez.   + Start HYDROmorphone (Dilaudid) PCA dose range 0.2-0.4 mg Q 10 min lockout interval with NO continuous rate.  Start with 0.2 mg PCA dose and increase dose by 0.1 mg every 1 hour as necessary for pain control or improvement in physical function, hold or reduce dose for sedation. Notify provider to assess for uncontrolled pain or sedation and adjust dose as necessary.          -- Hold other PO and IV opioids while on PCA    Nonopioid analgesics:   + Defer use of NSAID to surgeon. If able, would start ketorolac 15 mg IV every 6 hr scheduled   + Start acetaminophen 975 mg PO/FT every 6 hr scheduled if no concern about masking fevers  + Resume prior to admission medications related to pain when able to take PO or enteral meds: duloxetine   + Initiate lidocaine 4% patch - 1-3 patches every 24 hours scheduled (12 hr on 12 hr off). Apply to intact skin only.     Muscle Relaxant:   + Start tizanidine 2-4 mg PO or per FT q6hr PRN muscle spasms (note this is a home medication), hold for oversedation.     Stool softeners/Laxatives:   + When appropriate start senna-docusate 1-2 tabs PO/FT BID and Miralax 17 g PO/FT daily to prevent opioid induced constipation.     Other:  + Recommend close monitoring of respiratory status postoperatively with capnography and continuous SpO2 monitoring. Would recommend continuing capnography beyond the usual 24 hr due to patient's opioid tolerance.     -------------------------------------------------------------------------------------------------------------------  - OUTPATIENT MEDICATIONS (related  to pain management):  -- Long-acting opioid: Butrans patch 10 mcg/hr - replace 1 patch every Saturday   -- Short-acting opioid: tramadol 50 mg PO q6hr pRN (usually takes 2 doses/day), oxycodone 2.5 mg PO q6hr PRN (uses sparingly)  -- Oral adjuvant(s): acetaminophen PRN, tizanidine 4 mg PO QID PRN, Fioricet 1 tab q4hr PRN  -- Topicals: none   -- Other relevant medications: amitriptyline 10 mg PO at bedtime prn sleep    Outpatient opioids prescribed by Dr. Faiza Martinez ([facility])    ASSESSMENT:    Ada Welch has a history of chronic low back pain ~25 years with previous L4S1 fusion TLIF on 12/9/13 with worsening back pain after OR and subsequent removal of posterior insturmentation. Pain persisted after this and she also has joint pains in her shoulders hips knees and hands secondary to her colette danlos.  Of note she also has a chiari malformation (no surgical history on this), autonomic dysfunction (BP and HR fluctuations), urinary urgency/incontenince, chronic migrianes and hiatial hernia for which she is preparing for above mentioned surgery.   Today she reports pain is primarily her chronic back pain but has flares of her EDS pain intermittently. She has been working with Dr. Martinez for the past couple years and has dramatically improved her functional capacity over that time. She endorses doing well previously with surgery while maintaining on butrans patch and discussed this with Dr. Martinez and plans to stay on for upcoming procedure.  She denies any issues with sedation, constipation or other ADR with the butrans or opioids.  She does not know if she has ABHINAV.  She was supposed to have a sleep study done but has not had that done.  She endorses EtOH use but very rarely, she denies any recreational drugs, did previously have medical cannabis but not using now d/t sleepiness. She denies any issues with opioid abuse/misuse/diversion.  She is a former smoker and quit in  2013.  She is open to a multimodal pain management plan as outlined above.     Other pain therapies tried in the past (not an all inclusive list):   PCA - patient has received  PCA in the past without complication  Dilaudid - works very well postoperatively and would like to try PO dilaudid when transitioning off the PCA.   Gabapentin - did not tolerate, unsure of rxn  Pregabalin - did not tolerate, unsure of rxn.     Pain therapies never tried (not an all inclusive list):   Ketamine - has issues with autono dysfunction, spontaneous intracranial hypotension and non surgically corrected chiari malformation so would initially avoid ketamine and attempt to manage pain with other means first.     The inpatient pain service will follow up on patient after consult is placed and offer further recommendations for pain management.  If immediate assistance is needed please contact the pain service at the number below.     Cooper King [facility]  January 28, 2020  10:56 AM    If questions or concerns, please contact the Inpatient Pain Management Service:  Call [phone] after hours, weekends and holidays.   Page [phone] from 8 AM - 3 PM Mon - Fri.      Reviewed and Signed by PAC Pharmacist  Pharmacist: BRYN  Date: 1/31/20  Time:3640    Anamaria Sun PA-C

## 2020-01-28 NOTE — PROGRESS NOTES
"  Otolaryngology Clinic      Name: Ada Welch  MRN: [id number]  Age: 49 year old  : 1970      Chief Complaint:   Follow up      History of Present Illness:   Ada Welch is a 49 year old female who presents for follow up of chronic sinusitis.  Last seen in clinic 2017.  She returns today with concerns for constant nasal drainage.  If she leans forward, she has active drainage from her nose.  At times she does have some pressure in the area of her frontal sinuses.  She did see an allergist and had allergy testing which did not identify any specific allergen which might be causing her symptoms.    She also had pain behind her left ear and up into her temple area for a couple weeks.  She did have ringing in her ear but no change in her hearing.  She went to urgent care and was prescribed steroid drops for a middle ear effusion.  Those symptoms have now resolved.       Review of Systems:   Pertinent items are noted in HPI or as in patient entered ROS below, remainder of complete ROS is negative.   [facility] ROS 2020   Constitutional Weight loss, Appetite change, Unexplained fatigue, Problems with sleep   Neurology Dizzy spells, Numbness, Unexplained weakness, Headache   Eyes Visual loss, Double vision   Ears, Nose, Throat Hearing loss, Ear pain, Ringing/noise in ears, Nasal congestion or drainage, Sore throat, Trouble swallowing, Hoarseness   Cardiopulmonary Breathing problems, Chest pain   Gastrointestinal/Genitourinary Heartburn/indigestion, Pain with urination, Constipation, Diarrhea   Musculoskeletal Sore or stiff joints, Swollen joints, Back pain, Neck pain, Swollen legs/feet   Allergy/Immunology Allergies or hay fever, Skin changes   Hematologic Bleeding problems, Easy bruising, Lymph node swelling   Endocrine Thirst, Heat or cold intolerance, Frequent urination   Skin -   Other -         Physical Exam:   Ht 1.702 m (5' 7\")   Wt 85.3 kg (188 lb)   BMI 29.44 kg/m       General " Assessment   The patient is alert, oriented and in no acute distress.   Head/Face/Scalp  Grossly normal.   Ears  Normal canals, auricles and tympanic membranes.   Nose  External nose is straight, skin is normal. Intranasal exam (anterior rhinoscopy) reveals normal moist mucosa, turbinate tissue without edema, erythema or crusting.    Mouth  Oral cavity shows healthy mucosa with out ulceration, masses or other lesions involving the tongue, palate, buccal mucosa, floor of mouth or gingiva.  Dentition in good repair.  Oropharynx with normal tonsils, posterior wall and base of tongue. TMJ click on the left.  Neck  No significant adenopathy, thyroid or salivary gland abnormality.     Procedure:  Endoscopy indicated to examine posterior nasal passages for pathology  Topical anesthetic/decongestant spray applied.  Rigid scope used for visualization.  Findings:   Right: Slight septal deviation to the right anteriorly.  Thick clear secretions from the sinuses.  No polyps.  Mild inflammation.   Left: Thick clear secretions in the middle meatus.  No polyps.  Mild inflammation.     Assessment and Plan:  Ada Welch is a 49 year old female with a history of nasal polyps and chronic sinusitis here for follow up.  No new polyps or other pathology on exam.  Recommend nasal steroid spray to help decrease secretions, ,can use saline rinses as needed.    Follow-up: Return if symptoms worsen or fail to improve.         Scribe Disclosure:  I, Ellyn Lanier, am serving as a scribe to document services personally performed by Zoe Barahona MD at this visit, based upon the provider's statements to me. All documentation has been reviewed by the aforementioned provider prior to being entered into the official medical record.    The documentation recorded by the scribe accurately reflects the services I personally performed and the decisions made by me.  Zoe Barahona MD

## 2020-01-28 NOTE — TELEPHONE ENCOUNTER
1/28/20    Pt called regarding appointment made with hematology at the [facility] with Dr. Leach.  Messaged left with Pt that an appointment was able to be scheduled for this Thursday at 1400.  Pt instructed that if this appointment time would not work to call back and have it rescheduled.

## 2020-01-30 ENCOUNTER — OFFICE VISIT (OUTPATIENT)
Dept: HEMATOLOGY | Facility: CLINIC | Age: 50
End: 2020-01-30
Attending: PHYSICIAN ASSISTANT
Payer: COMMERCIAL

## 2020-01-30 VITALS
TEMPERATURE: 97.9 F | DIASTOLIC BLOOD PRESSURE: 67 MMHG | SYSTOLIC BLOOD PRESSURE: 106 MMHG | HEART RATE: 57 BPM | HEIGHT: 67 IN | RESPIRATION RATE: 12 BRPM | OXYGEN SATURATION: 100 % | WEIGHT: 188 LBS | BODY MASS INDEX: 29.51 KG/M2

## 2020-01-30 DIAGNOSIS — R79.1 ELEVATED FACTOR VIII LEVEL: Primary | ICD-10-CM

## 2020-01-30 DIAGNOSIS — Q79.60 EHLERS-DANLOS SYNDROME: ICD-10-CM

## 2020-01-30 DIAGNOSIS — K44.9 HIATAL HERNIA: ICD-10-CM

## 2020-01-30 LAB
APTT PPP: 29 SEC (ref 22–37)
FERRITIN SERPL-MCNC: 7 NG/ML (ref 8–252)
INR PPP: 0.93 (ref 0.86–1.14)
IRON SATN MFR SERPL: NORMAL % (ref 15–46)
IRON SERPL-MCNC: NORMAL UG/DL (ref 35–180)
TIBC SERPL-MCNC: NORMAL UG/DL (ref 240–430)

## 2020-01-30 PROCEDURE — 99214 OFFICE O/P EST MOD 30 MIN: CPT | Performed by: PHYSICIAN ASSISTANT

## 2020-01-30 PROCEDURE — 85610 PROTHROMBIN TIME: CPT | Performed by: PHYSICIAN ASSISTANT

## 2020-01-30 PROCEDURE — 83550 IRON BINDING TEST: CPT | Performed by: PHYSICIAN ASSISTANT

## 2020-01-30 PROCEDURE — G0463 HOSPITAL OUTPT CLINIC VISIT: HCPCS

## 2020-01-30 PROCEDURE — 85240 CLOT FACTOR VIII AHG 1 STAGE: CPT | Performed by: PHYSICIAN ASSISTANT

## 2020-01-30 PROCEDURE — 82728 ASSAY OF FERRITIN: CPT | Performed by: PHYSICIAN ASSISTANT

## 2020-01-30 PROCEDURE — 00000401 ZZHCL STATISTIC THROMBIN TIME NC: Performed by: PHYSICIAN ASSISTANT

## 2020-01-30 PROCEDURE — 36415 COLL VENOUS BLD VENIPUNCTURE: CPT

## 2020-01-30 PROCEDURE — 36415 COLL VENOUS BLD VENIPUNCTURE: CPT | Performed by: PHYSICIAN ASSISTANT

## 2020-01-30 PROCEDURE — 85730 THROMBOPLASTIN TIME PARTIAL: CPT | Performed by: PHYSICIAN ASSISTANT

## 2020-01-30 ASSESSMENT — MIFFLIN-ST. JEOR: SCORE: 1515.39

## 2020-01-30 ASSESSMENT — PAIN SCALES - GENERAL: PAINLEVEL: SEVERE PAIN (6)

## 2020-01-30 NOTE — PROGRESS NOTES
[facility]  [address] [place] [address], [place] [address]  Main: [phone], Fax: [phone]    [facility] seen at: [facility] at [address] [place] [address]    Outpatient Visit Note:    Patient: Ada Welch  MRN: [id number]  : 1970  CHEMO: 2020    Reason:  History of EDS and elevated factor VIII level. Here to discuss need for pharmacological DVT/PE prophylaxis for her upcoming hiatal hernia repair surgery.     Clinical History Summary:  Scarlett is a 49 year old female with a history of Ehler's Danlos Syndrome Type III, migraines, chronic back pain, tachycardia and urinary incontinence who also was found to have elevated factor VIII level in the past but without any personal or family history of venous thromboembolism, returns to clinic today to discuss need for pharmacological DVT/PE prophylaxis for her upcoming hiatal hernia repair surgery. This patient was last seen by Dr. Cynthia Leach, staff hematologist at this clinic back in . Please see her note for this patient's detail history.     She apparently was diagnosed with EDS Type III and reportedly had a Beighton score of 7-8 out of 9. She reportedly had genetic testing done which was questionable for the MYLK Gene.     She has had history of some bleeding diathesis including epistaxis and easy bruising but she also has had multiple surgical procedures done in the past including spinal fusion and multiple nasal surgeries without any bleeding complications. However, she does report having menorrhagia throughout her life and at some point she was on IV iron replacement due to anemia. She reports currently that she still have menorrhagia.     Apparently back in , after her diagnosis of EDS, a work up for bleeding was done as at the time, it was thought that people with EDS has some association with Von Willebrand Disease. This was when she was first found to  "have an elevated factor VIII level at 280% on 10/10/2016. This test was repeated in Jan 2017 and it was even more elevated at 352%. At the time, she was not ill or hospitalized by any means. She did not have any surgical procedure around the time of the elevated factor VIII levels were done.     Interim History:  During her visit with Dr. Hany panchal in 2018, it was Dr. Leach's recommendation that she should be placed on pharmacological DVT/PE prophylaxis for a short duration after surgical procedures. She is now scheduled to undergo hiatal hernia repair surgery on 2/10/2020 by Dr. Mack here at the [facility]. It is anticipated that she will be hospitalized for a 2-3 days after surgery.     Since she saw Dr. Leach back in 2018, she has not had any issues with venous thromboembolism. She has no bleeding issues other than continue to have reported menorrhagia.     ROS:  Denies any shortness of breath. No chest pain. Denies any abdominal pain. Denies any issues with bleeding. No epistaxis. No hematuria or blood in stools.     Medication, Allergies and PmHx:  All have been reviewed by this writer in the electronic medical records.    Social History and Family History:  Deferred.    Objective:  Pleasant in no acute distress.  Vitals: /67 (BP Location: Right arm, Patient Position: Sitting, Cuff Size: Adult Regular)   Pulse 57   Temp 97.9  F (36.6  C) (Oral)   Resp 12   Ht 1.702 m (5' 7\")   Wt 85.3 kg (188 lb)   SpO2 100%   BMI 29.44 kg/m    Exam:  Complete exam is not performed today.     Labs:      Component      Latest Ref Rng & Units 1/28/2020   Sodium      133 - 144 mmol/L 138   Potassium      3.4 - 5.3 mmol/L 5.0   Chloride      94 - 109 mmol/L 106   Carbon Dioxide      20 - 32 mmol/L 28   Anion Gap      3 - 14 mmol/L 4   Glucose      70 - 99 mg/dL 96   Urea Nitrogen      7 - 30 mg/dL 10   Creatinine      0.52 - 1.04 mg/dL 0.70   GFR Estimate      >60 mL/min/1.73:m2 >90   GFR Estimate If Black    "   >60 mL/min/1.73:m2 >90   Calcium      8.5 - 10.1 mg/dL 8.8   WBC      4.0 - 11.0 10e9/L 9.0   RBC Count      3.8 - 5.2 10e12/L 4.46   Hemoglobin      11.7 - 15.7 g/dL 12.1   Hematocrit      35.0 - 47.0 % 38.5   MCV      78 - 100 fl 86   MCH      26.5 - 33.0 pg 27.1   MCHC      31.5 - 36.5 g/dL 31.4 (L)   RDW      10.0 - 15.0 % 14.8   Platelet Count      150 - 450 10e9/L 271     Assessment:  In summary, Rebekah is a 49 year old female with a history of Ehler's Danlos Syndrome Type III, migraines, chronic back pain, tachycardia and urinary incontinence who also was found to have elevated factor VIII level with no personal or family history of DVT/PE, returns to clinic today to discuss need for pharmacological DVT/PE prophylaxis for her upcoming laparoscopic hiatal hernia repair surgery.     As mentioned, Rebekah has no personal or family history of DVT/PE despite history of persistently elevated factor VIII levels back in 2016 / 2017.     Diagnosis:  1. Ehler's Danlos Syndrome Type III with no significant history of bleeding diathesis with surgical procedures.  2. Elevated factor VIII levels with no personal or family history of venous thromboembolism.     Plan:  First and foremost, I do think that we should repeat another factor VIII level today. Final recommendation will depends on this most recent factor VIII levels. If it is elevated, we would likely consider recommending pharmacological DVT/PE prophylaxis for 7 days post operatively. Otherwise, if factor VIII level is normal, we would recommend that she follow routine standard protocol for DVT/PE prophylaxis in patients who undergo similar abdominal surgery.     The patient does mention menorrhagia. Will check Iron panel and ferritin. If she should be found to be iron deficient, will defer to her primary care provider to manage her iron deficiency.     Ana Rosa Forman, nurse clinician is present throughout the entire clinic visit.     Total Time Spent:  28 minutes, all  28 minutes was spent on face-to-face consultation with the patient and coordination of care in regard to her history of elevated factor VIII levels and possible need for pharmacological DVT/PE prophylaxis for her upcoming surgical procedure.     Time IN: 14:09  Time OUT: 14:37      Cirilo Soto PA-C, MPAS  Physician Assistant  [facility].     Addendum 1/31/2020 at 10:50am    Component      Latest Ref Rng & Units 1/30/2020   Iron      35 - 180 ug/dL Unsatisfactory specimen - hemolyzed   Iron Binding Cap      240 - 430 ug/dL Unsatisfactory specimen - hemolyzed   Iron Saturation Index      15 - 46 % Unable to Calculate   PTT      22 - 37 sec 29   INR      0.86 - 1.14 0.93   Ferritin      8 - 252 ng/mL 7 (L)   Factor 8 Assay      55 - 200 % 217 (H)     Her factor VIII level remains elevated at 217%. Would recommend that she should be on pharmacological DVT/PE prophylaxis with enoxaparin at 40 mg SubQ Q 24 hours for 7 days post operatively starting 12-24 hours after surgery assuming that she is hemostatically stable.     Although the iron panel specimen was hemolyzed, her ferritin is low, she is iron deficient and thus, recommending having her primary care provider to manage iron deficiency.       Cirilo Soto PA-C, BLAISES  Physician Assistant  [facility].

## 2020-01-30 NOTE — LETTER
2020      RE: Ada Welch  [address]  [place] [address]           [facility]  [address] [place] [address] [address], [place] [address]  Main: [phone], Fax: [phone]    Patient seen at: [facility] at [address] [place] [address]    Outpatient Visit Note:    Patient: Ada Welch  MRN: [id number]  : 1970  CHEMO: 2020    Reason:  History of EDS and elevated factor VIII level. Here to discuss need for pharmacological DVT/PE prophylaxis for her upcoming hiatal hernia repair surgery.     Clinical History Summary:  Scarlett is a 49 year old female with a history of Ehler's Danlos Syndrome Type III, migraines, chronic back pain, tachycardia and urinary incontinence who also was found to have elevated factor VIII level in the past but without any personal or family history of venous thromboembolism, returns to clinic today to discuss need for pharmacological DVT/PE prophylaxis for her upcoming hiatal hernia repair surgery. This patient was last seen by Dr. Cynthia Leach, staff hematologist at this clinic back in . Please see her note for this patient's detail history.     She apparently was diagnosed with EDS Type III and reportedly had a Beighton score of 7-8 out of 9. She reportedly had genetic testing done which was questionable for the MYLK Gene.     She has had history of some bleeding diathesis including epistaxis and easy bruising but she also has had multiple surgical procedures done in the past including spinal fusion and multiple nasal surgeries without any bleeding complications. However, she does report having menorrhagia throughout her life and at some point she was on IV iron replacement due to anemia. She reports currently that she still have menorrhagia.     Apparently back in , after her diagnosis of EDS, a work up for bleeding was done as at the time, it was thought that people with EDS has some  "association with Von Willebrand Disease. This was when she was first found to have an elevated factor VIII level at 280% on 10/10/2016. This test was repeated in Jan 2017 and it was even more elevated at 352%. At the time, she was not ill or hospitalized by any means. She did not have any surgical procedure around the time of the elevated factor VIII levels were done.     Interim History:  During her visit with Dr. Leach back in 2018, it was Dr. Leach's recommendation that she should be placed on pharmacological DVT/PE prophylaxis for a short duration after surgical procedures. She is now scheduled to undergo hiatal hernia repair surgery on 2/10/2020 by Dr. Frederick chavis at the [facility]. It is anticipated that she will be hospitalized for a 2-3 days after surgery.     Since she saw Dr. Leach back in 2018, she has not had any issues with venous thromboembolism. She has no bleeding issues other than continue to have reported menorrhagia.     ROS:  Denies any shortness of breath. No chest pain. Denies any abdominal pain. Denies any issues with bleeding. No epistaxis. No hematuria or blood in stools.     Medication, Allergies and PmHx:  All have been reviewed by this writer in the electronic medical records.    Social History and Family History:  Deferred.    Objective:  Pleasant in no acute distress.  Vitals: /67 (BP Location: Right arm, Patient Position: Sitting, Cuff Size: Adult Regular)   Pulse 57   Temp 97.9  F (36.6  C) (Oral)   Resp 12   Ht 1.702 m (5' 7\")   Wt 85.3 kg (188 lb)   SpO2 100%   BMI 29.44 kg/m     Exam:  Complete exam is not performed today.     Labs:      Component      Latest Ref Rng & Units 1/28/2020   Sodium      133 - 144 mmol/L 138   Potassium      3.4 - 5.3 mmol/L 5.0   Chloride      94 - 109 mmol/L 106   Carbon Dioxide      20 - 32 mmol/L 28   Anion Gap      3 - 14 mmol/L 4   Glucose      70 - 99 mg/dL 96   Urea Nitrogen      7 - 30 mg/dL 10   Creatinine      0.52 - 1.04 " mg/dL 0.70   GFR Estimate      >60 mL/min/1.73:m2 >90   GFR Estimate If Black      >60 mL/min/1.73:m2 >90   Calcium      8.5 - 10.1 mg/dL 8.8   WBC      4.0 - 11.0 10e9/L 9.0   RBC Count      3.8 - 5.2 10e12/L 4.46   Hemoglobin      11.7 - 15.7 g/dL 12.1   Hematocrit      35.0 - 47.0 % 38.5   MCV      78 - 100 fl 86   MCH      26.5 - 33.0 pg 27.1   MCHC      31.5 - 36.5 g/dL 31.4 (L)   RDW      10.0 - 15.0 % 14.8   Platelet Count      150 - 450 10e9/L 271     Assessment:  In summary, Rebekah is a 49 year old female with a history of Ehler's Danlos Syndrome Type III, migraines, chronic back pain, tachycardia and urinary incontinence who also was found to have elevated factor VIII level with no personal or family history of DVT/PE, returns to clinic today to discuss need for pharmacological DVT/PE prophylaxis for her upcoming laparoscopic hiatal hernia repair surgery.     As mentioned, Rebekah has no personal or family history of DVT/PE despite history of persistently elevated factor VIII levels back in 2016 / 2017.     Diagnosis:  1. Ehler's Danlos Syndrome Type III with no significant history of bleeding diathesis with surgical procedures.  2. Elevated factor VIII levels with no personal or family history of venous thromboembolism.     Plan:  First and foremost, I do think that we should repeat another factor VIII level today. Final recommendation will depends on this most recent factor VIII levels. If it is elevated, we would likely consider recommending pharmacological DVT/PE prophylaxis for 7 days post operatively. Otherwise, if factor VIII level is normal, we would recommend that she follow routine standard protocol for DVT/PE prophylaxis in patients who undergo similar abdominal surgery.     The patient does mention menorrhagia. Will check Iron panel and ferritin. If she should be found to be iron deficient, will defer to her primary care provider to manage her iron deficiency.     Ana Rosa Forman, nurse clinician is  present throughout the entire clinic visit.     Total Time Spent:  28 minutes, all 28 minutes was spent on face-to-face consultation with the patient and coordination of care in regard to her history of elevated factor VIII levels and possible need for pharmacological DVT/PE prophylaxis for her upcoming surgical procedure.     Time IN: 14:09  Time OUT: 14:37      Cirilo Soto PA-C, DIA  Physician Assistant  [facility].     Addendum 1/31/2020 at 10:50am    Component      Latest Ref Rng & Units 1/30/2020   Iron      35 - 180 ug/dL Unsatisfactory specimen - hemolyzed   Iron Binding Cap      240 - 430 ug/dL Unsatisfactory specimen - hemolyzed   Iron Saturation Index      15 - 46 % Unable to Calculate   PTT      22 - 37 sec 29   INR      0.86 - 1.14 0.93   Ferritin      8 - 252 ng/mL 7 (L)   Factor 8 Assay      55 - 200 % 217 (H)     Her factor VIII level remains elevated at 217%. Would recommend that she should be on pharmacological DVT/PE prophylaxis with enoxaparin at 40 mg SubQ Q 24 hours for 7 days post operatively starting 12-24 hours after surgery assuming that she is hemostatically stable.     Although the iron panel specimen was hemolyzed, her ferritin is low, she is iron deficient and thus, recommending having her primary care provider to manage iron deficiency.       Cirilo Soto PA-C, DIA  Physician Assistant  [facility].             Cirilo Soto PA-C

## 2020-01-30 NOTE — PATIENT INSTRUCTIONS
1.  Labs today (Factor VIII & iron panel).  2.  Lovenox 40 mg possible after surgery depending on lab results  3.  See back as needed.

## 2020-01-31 ENCOUNTER — TELEPHONE (OUTPATIENT)
Dept: HEMATOLOGY | Facility: CLINIC | Age: 50
End: 2020-01-31

## 2020-01-31 DIAGNOSIS — R79.1 ELEVATED FACTOR VIII LEVEL: Primary | ICD-10-CM

## 2020-01-31 LAB — FACT VIII ACT/NOR PPP: 217 % (ref 55–200)

## 2020-01-31 NOTE — TELEPHONE ENCOUNTER
Scarlett Welch   Legal Name: Ada Welch   49 year old, 1970   Hx of EDS/Elevated F8    Patient notified of her lab results below.  SUZANNE Castaneda would like her to follow up with PCP about iron deficiency.  He would like her to get Lovenox 40 mg daily x 7 (to start 12-24 hours after surgery with surgeon approval) Lap hiatal hernia repair scheduled 2/10/20.  Will send script to her local pharmacy ([facility]).    Component      Latest Ref Rng & Units 1/30/2020   Ferritin      8 - 252 ng/mL 7 (L)   Factor 8 Assay      55 - 200 % 217 (H)   Gladis Forman, MSN, RN, PHN -Nurse Clinician, [facility] [phone]

## 2020-02-07 ENCOUNTER — OFFICE VISIT (OUTPATIENT)
Dept: INTERNAL MEDICINE | Facility: CLINIC | Age: 50
End: 2020-02-07
Payer: COMMERCIAL

## 2020-02-07 ENCOUNTER — MYC MEDICAL ADVICE (OUTPATIENT)
Dept: INTERNAL MEDICINE | Facility: CLINIC | Age: 50
End: 2020-02-07

## 2020-02-07 VITALS
WEIGHT: 196.5 LBS | BODY MASS INDEX: 30.84 KG/M2 | HEIGHT: 67 IN | HEART RATE: 54 BPM | RESPIRATION RATE: 16 BRPM | TEMPERATURE: 97.9 F | DIASTOLIC BLOOD PRESSURE: 77 MMHG | OXYGEN SATURATION: 99 % | SYSTOLIC BLOOD PRESSURE: 116 MMHG

## 2020-02-07 DIAGNOSIS — G90.9 AUTONOMIC DYSFUNCTION: ICD-10-CM

## 2020-02-07 DIAGNOSIS — Q07.00 ARNOLD-CHIARI DEFORMITY (H): ICD-10-CM

## 2020-02-07 DIAGNOSIS — G89.4 PAIN SYNDROME, CHRONIC: ICD-10-CM

## 2020-02-07 DIAGNOSIS — K44.9 HIATAL HERNIA: Primary | ICD-10-CM

## 2020-02-07 DIAGNOSIS — G43.809 OTHER MIGRAINE WITHOUT STATUS MIGRAINOSUS, NOT INTRACTABLE: ICD-10-CM

## 2020-02-07 DIAGNOSIS — E03.9 HYPOTHYROIDISM, UNSPECIFIED TYPE: ICD-10-CM

## 2020-02-07 DIAGNOSIS — E61.1 IRON DEFICIENCY: ICD-10-CM

## 2020-02-07 ASSESSMENT — PAIN SCALES - GENERAL: PAINLEVEL: SEVERE PAIN (6)

## 2020-02-07 ASSESSMENT — MIFFLIN-ST. JEOR: SCORE: 1553.95

## 2020-02-07 NOTE — PROGRESS NOTES
[facility]  Primary [facility] Center   Ellyn Rivera MD  02/07/2020      Chief Complaint:   Recheck Medication     History of Present Illness:   Ada Welch is a 49 year old female with a complex medical history including Tom-Danlos syndrome, chiari malformation, chronic pain, and cervicalgia who presents for a follow-up.     Laparoscopic hiatal hernia repair:   She will be having hiatal hernia repair surgery on 2/10/2020 for hiatal hernia and eosinophils esophagitis. The patient describes that her stomach is 2/3 the way into her esophagus and is trying to prevent emergency surgery. She believes this was relates to food getting stuck in her esophagus.     She saw Dr. 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 They recommended pharmacological DVT/PE prophylaxis for 7 days post operatively since she is still testing high for factor VIII . They recommended she inject this into her skin, not the muscle. She was told to continue taking the butrans after meeting with the pharmacist and anesthesologist.    Autonomic dysfunction/heart rate:   Dr. Russell took her off amitriptyline, florinef, and midodrine. He placed her on idarabine instead to control her HR. Per patient, her HR use to fluctuate between 30s - 200s. However, since starting, idarabine 2x daily, her HR has been much more regular. She states that with the HSA card this medication cost her $300, however has already met her detuctable on her insurance. She denies any side effects of the medicaitons. She not told to hold any of her medications before surgery, only take with water. She use to monitor her BP at home, however has not been checking recently. Continues to take propanolol. She will start the lovenox after surgery.     Hair loss/ low energy/chronic pain:   She describes being exhausted and feeling like she could sleep for 10 hours, yet continues to have difficulty  sleeping. Dr. Russell took her off amitriptyline, her medical marijuana card just  and just ran out of medical cannabis. She feels medical cannabis helps her sleep as well as for pain. She would like to renew her card.    She is currently losing hair by the handfuls as well. She would like to know if increasing her synthroid would be beneficial. Notably, she has a history of low iron and ferritin.  She has heavy menstrual cycles (changes pads every few hours) that last 5-7 days. She is not taking any birth control, but had her tubes tied. She has been eating a keto diet. She takes a multivitamin, but no iron supplements specifically. Previous colonscopy showed diverticulitis and polyps. Per patient, she has had chornic constipation, however using Reglan keeps her BM regular.     PEG: A Three Item Scale Assessing Pain Intensity and Interference  1) What number best describes your pain on average in the past week (0-10): 7   2) What number best describes how, during the past week, pain has interfered with your environment of life (0-10)? 7   3) What number best describes how, during the past week, pain has interfered with your general activity (0-10)? 7     (0 = no pain; 10 = Pain as bad as you can imagine)    Migraines/chiari deformity/black outs:   Reports not doing well. Missed appointments with Dr. Nguyen due to blacking out. Before episode of blacking out, she will get tunnel vision and ringing ear. She is able to feel the onset so will get to the floor before losing consciousness for a few seconds. This happened a few times in January. She denies a racing HR, seizures, shaking during these episodes. It was also prior to taking her medications that day.     Dr. Nguyen had an MRI done of her brain on 2018 that showed Cerebellar tonsils extend up to 4 mm below the level of theforamen magnum and demonstrate mild pointed morphology - demonstrating chiari deformity. Per patient, neurologists in the field  treat at different levels of severity. She would like another neurologists input as to if she chiari as she feels that she has text book symptoms including headaches.          Detailed Medical History:  -EDS-Type 3 Hypermobility. 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 He reports having a Beighton score of 7-8 out of 9. She reports having genetic testing done, which was questionable for the MYLK Gene. She previously followed with a rheumatologist who is an EDS specialist in [place].  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 Russell, has ILR.  -Cervical instability, chiari malformation:  Saw Dr. Moncada, cervical fusion not recommended, considering occipital blocks. Also traveled to [facility] to see Dr. Andrey Sofia.  -Chronic headaches: Daily tension, cervicogenic, migraine. Has not responded well to injections. Hospitalized in 2017 with normal LP and MRI. At that time responded to DHA.  Had blood patch done 3/19 with good relief of pain. Previously failed amovig and emgality. Blood patch test repeated 9/19.  -Hiatal hernia: Recently saw Dr. Mack and had manometry testing done in Feb 2019 which revealed lower esoph pressure, hiatal hernia, possibly short esophagus, peristalsis was preserved. Considering laparoscopic repair.  -Eosinophilic esophagitis: EGD 12/18 performed for food impaction showed ring at GEJ and mucosal changes c/w EoE. Biopsies 8/18 positive for Eos.  -Lumbar pain: She underwent L4 to S1 fusion in 2013 followed by removal of instrumentation, which did not improve her symptoms. She met with Dr. Martinez in the pain clinic and was taken off high doses of Dilaudid and started on Suboxone in 2017, which dramatically improved her symptoms.  She continues to work with physical therapy twice a week as well as other complementary therapies.   -Chronic Pain: On suboxone per above, as well as occasional oxycodone and ultram. Uses medical cannabis since 2018 which helps.  -IBS,  constipation: GES showed rapid emptying 10/17. XR video swallow showed no aspiration 10/17.  -Asthma  -Enlarged cervical lymph nodes: L level 2 node biopsied 10/18 with scant cellularity. Biopsied in past and benign with neg flow cytometry.  -Urinary urge incontinence/incomplete emptying  -Pelvic floor dysfunction, rectocele           Routine Health Maintenence:  Depression Screening:   PHQ-2 ( 1999 Pfizer) 2/14/2019 11/8/2018   Q1: Little interest or pleasure in doing things 0 0   Q2: Feeling down, depressed or hopeless 0 0   PHQ-2 Score 0 0   Q1: Little interest or pleasure in doing things Not at all Not at all   Q2: Feeling down, depressed or hopeless Not at all Not at all   PHQ-2 Score 0 0      Immunizations (zoster, pneumovax, flu, Tdap, Hep A/B):   There is no immunization history on file for this patient.  Lipids:         Recent Labs   Lab Test 04/26/18  1010   CHOL 244*   HDL 48*   *   TRIG 156*      Lung Ca Screening (>30 pk age 55-79 or >20 py age 50-79 + RF): n/a low pack years  Colonoscopy (50-75 yrs): 3/17   - One small hyperplastic polyp in the sigmoid colon, removed with a cold snare. Resected and retrieved. Diverticulosis in the sigmoid colon. Repeat colonoscopy in10 years for surveillance   Dexa (>65W or 70M yrs): n/a  Mammogram (40-75 yrs): 1/18 ordered, due  Pap (21-65 yrs): 1/18 HPV neg  HIV/HCV if risk factors:  HIV neg 4/18  Tob/EtOH: screen neg  Lifestyle factors: reviewed  Advanced Directive: deferred    Review of Systems:   Pertinent items are noted in HPI or as in patient entered ROS below, remainder of complete ROS is negative.     Active Medications:      albuterol (PROAIR HFA/PROVENTIL HFA/VENTOLIN HFA) 108 (90 BASE) MCG/ACT Inhaler, Inhale 2 puffs into the lungs every 6 hours as needed , Disp: , Rfl:      amitriptyline (ELAVIL) 10 MG tablet, Take 1 tablet (10 mg) by mouth At Bedtime (Patient taking differently: Take 10 mg by mouth nightly as needed for sleep ), Disp: 60 tablet,  Rfl: 1     buprenorphine (BUTRANS) 10 MCG/HR WK patch, Place 1 patch onto the skin once a week, Disp: 4 patch, Rfl: 1     butalbital-acetaminophen-caffeine (FIORICET/ESGIC) -40 MG tablet, TAKE ONE TABLET BY MOUTH EVERY 4 HOURS AS NEEDED. MAX 10 PER MONTH., Disp: 20 tablet, Rfl: 0     cetirizine (ZYRTEC) 10 MG tablet, Take 10 mg by mouth daily as needed , Disp: , Rfl:      DULoxetine (CYMBALTA) 30 MG capsule, Take 60mg in the morning and 30mg at bedtime, Disp: 270 capsule, Rfl: 3     enoxaparin ANTICOAGULANT (LOVENOX) 40 MG/0.4ML syringe, Inject 0.4 mLs (40 mg) Subcutaneous daily for 7 days, Disp: 7 Syringe, Rfl: 0     EPINEPHrine (EPIPEN/ADRENACLICK/OR ANY BX GENERIC EQUIV) 0.3 MG/0.3ML injection 2-pack, INJECT 0.3 MLS (0.3 MG) INTO THE MUSCLE AS NEEDED FOR ANAPHYLAXIS, Disp: , Rfl: 1     famotidine (PEPCID) 20 MG tablet, Take 1 tablet (20 mg) by mouth At Bedtime, Disp: 90 tablet, Rfl: 3     fluticasone (FLONASE) 50 MCG/ACT nasal spray, INSTILL 2 SPRAYS INTO BOTH NOSTRILS DAILY, Disp: 48 mL, Rfl: 3     ivabradine (CORLANOR) 7.5 MG tablet, Take 1 tablet (7.5 mg) by mouth 2 times daily (with meals), Disp: 60 tablet, Rfl: 3     levothyroxine (SYNTHROID/LEVOTHROID) 25 MCG tablet, Take 1 tablet (25 mcg) by mouth daily, Disp: 90 tablet, Rfl: 2     medical cannabis (Patient's own supply.  Not a prescription), Take 1 Dose by mouth See Admin Instructions (This is NOT a prescription, and does not certify that the patient has a qualifying medical condition for medical cannabis.  The purpose of this order is  to document that the patient reports taking medical cannabis.)   Capsule formulation - uses as needed (currently out of this and not using as of January 28, 2020), Disp: , Rfl:      metoclopramide (REGLAN) 10 MG tablet, Take 1 tablet (10 mg) by mouth 4 times daily (before meals and nightly), Disp: 40 tablet, Rfl: 3     Multiple Vitamins-Minerals (MULTIVITAMIN PO), Take 1 tablet by mouth daily , Disp: , Rfl:       naloxone (NARCAN) 4 MG/0.1ML nasal spray, Spray 1 spray (4 mg) into one nostril alternating nostrils once as needed for opioid reversal every 2-3 minutes until assistance arrives, Disp: 0.2 mL, Rfl: 0     naproxen sodium (ANAPROX) 220 MG tablet, Take 440-660 mg by mouth daily as needed for moderate pain, Disp: , Rfl:      omeprazole (PRILOSEC) 40 MG DR capsule, Take 1 capsule (40 mg) by mouth 2 times daily as needed (prn) (Patient taking differently: Take 40 mg by mouth 2 times daily ), Disp: 60 capsule, Rfl: 1     ondansetron (ZOFRAN-ODT) 4 MG ODT tab, Take 1-2 tablets (4-8 mg) by mouth every 12 hours as needed for nausea, Disp: 60 tablet, Rfl: 1     oxyCODONE (ROXICODONE) 5 MG tablet, Take 0.5 tablets (2.5 mg) by mouth every 6 hours as needed for severe pain, Disp: 15 tablet, Rfl: 0     Probiotic Product (PROBIOTIC DAILY PO), Take 2 packets by mouth every morning , Disp: , Rfl:      propranolol ER (INDERAL LA) 120 MG 24 hr capsule, Take 1 capsule (120 mg) by mouth daily, Disp: 30 capsule, Rfl: 11     rizatriptan (MAXALT-MLT) 5 MG ODT, TAKE 1-2 TABLETS (5-10 MG) BY MOUTH AT ONSET OF HEADACHE FOR MIGRAINE (MAX 30 MG IN 24 HOURS), Disp: 18 tablet, Rfl: 11     SUMAtriptan (IMITREX STATDOSE) 6 MG/0.5ML pen injector kit, Inject 0.5 ml (6 mg) subcutaneously at onset of migraine, May repeat in 1 hour , Max 12 mg/24 hrs, Disp: 2 kit, Rfl: 11     tiZANidine (ZANAFLEX) 4 MG tablet, Take 1 tablet (4 mg) by mouth 4 times daily as needed for muscle spasms, Disp: 360 tablet, Rfl: 3     traMADol (ULTRAM) 50 MG tablet, Take 1 tablet (50 mg) by mouth every 6 hours as needed for severe pain, Disp: 60 tablet, Rfl: 0     vitamin B complex with vitamin C (VITAMIN  B COMPLEX) TABS tablet, Take 1 tablet by mouth daily, Disp: , Rfl:      VITAMIN D, CHOLECALCIFEROL, PO, Take 2,000 Units by mouth daily, Disp: , Rfl:      fludrocortisone (FLORINEF) 0.1 MG tablet, TAKE 1 TABLET BY MOUTH TWICE A DAY, Disp: , Rfl: 11     midodrine (PROAMATINE)  10 MG tablet, TAKE 1 TABLET BY MOUTH THREE TIMES A DAY, Disp: , Rfl: 11      Allergies:   Bee venom; No clinical screening - see comments; Chicken-derived products (egg); Compazine [prochlorperazine]; Food; Gabapentin; Gluten meal; Pregabalin; Seasonal allergies; Topiramate; Wheat; and Wheat bran      Past Medical History:  Allergic rhinitis  Anxiety  Arthritis  Autoimmune disease  Autonomic dysfunction  High for factor VIII  Cervicalgia  Sinusitis   CSF leak   Depression  Ehler-Danlos disease  Eosinophils esophagitis   Gastroesophageal reflux disease  Hiatal hernia  Growing lymph node   Hypothyroid  Irritable bowel syndrome  Migraines  Nasal polyps  Orthostatic hypotension  Urinary incontinence   Nocturia  Astigmatism  Arthritis   Opioid dependence  Cyst of fallopian tube  Deviated nasal septum  Head and neck lymphadenopathy  Hepatomegaly  Keratoconjunctivitis sicca  Liver lesion  Lumbar degenerative disc disease  Lumbar radiculopathy  Obese   Nasal turbinate hypertrophy  Presbyopia   Spondylolisthesis of lumbar region  Sinus tachycardia  Endometriosis  Gastroesophageal reflux disease     Past Surgical History:  Repair nasal septum - 2009  Spinal fusion L5-S1 - 2013,2014  colonoscopy - 2017  Esophageal inpedence function test - 2019  Esophagoscopy, gastroscopy,duodenoscopy - 2018 x3  nasal sinus polypectomy - 2017  Tonsillectomy and adenoidectomy - 1981  Tubal ligation - 2011    Family History:   Connective tissues disorder - mother, sister  Spinal tumor - father  Migraines - father  Diabetes - maternal grandmother, paternal grandmother and grandfather  Heart disease - maternal grandmother  Hypertension - maternal grandmother  Asthma  - sister, son  Migraines - sister   Thyroid disease -sister      Social History:   This patient presented alone.   Smoking status: former of 0.2 packs per day  Smokeless tobacco: never  Alcohol use: yes  Drug use: marijuana     Physical Exam:   /77 (BP Location: Right arm,  "Patient Position: Sitting, Cuff Size: Adult Regular)   Pulse 54   Temp 97.9  F (36.6  C) (Oral)   Resp 16   Ht 1.702 m (5' 7\")   Wt 89.1 kg (196 lb 8 oz)   SpO2 99%   BMI 30.78 kg/m     Constitutional: Alert, oriented, pleasant, no acute distress  Head: Normocephalic, atraumatic  Eyes: Extra-ocular movements intact, no scleral icterus  ENT: Oropharynx clear, moist mucus membranes, good dentition  Neck: Supple, no lymphadenopathy  Cardiovascular: Regular rate and rhythm, no murmurs, rubs or gallops, peripheral pulses full/symmetric  Respiratory: Good air movement bilaterally, lungs clear, no wheezes/rales/rhonchi  Musculoskeletal: No edema, normal muscle tone, normal gait  Neurologic: Alert and oriented, cranial nerves 2-12 intact.  Psychiatric: normal mentation, affect and mood      Assessment and Plan:  Hiatal hernia  Scheduled for laparoscopic repair next week. I will discuss her cardiac medications with her cardiologist.     Other migraine without status migrainosus, not intractable  Arnold-Chiari deformity (H)  She recently had a blood patch done. She is wondering if her chiari malformation needs to be addressed. She is wondering about a specialist for this issues. I advised checking with Dr. Tavo ruiz or seeing Dr. Baltazar with [facility].     Iron deficiency  Likely related to menhorragia. She reports adequate iron intake she is not anemic. I think she would benefit from iron supplementation which I think could help with hair loss.   - ferrous fumarate 65 mg, [place]. FE,-Vitamin C 125 mg (VITRON-C)  MG TABS tablet  Dispense: 60 tablet; Refill: 1    Hypothyroidism, unspecified type  - TSH    Autonomic dysfunction  Recently started on idarabine which has helped her symptoms.     Pain syndrome, chronic  She requests re-certification of medical cannabis.        Scribe Disclosure:  I, Tosha Oglesby and Sweta Byrne, am serving as a scribe to document services personally performed by Ellyn Rivera MD at " this visit, based upon the provider's statements to me. All documentation has been reviewed by the aforementioned provider prior to being entered into the official medical record.     Portions of this medical record were completed by a scribe. UPON MY REVIEW AND AUTHENTICATION BY ELECTRONIC SIGNATURE, this confirms (a) I performed the applicable clinical services, and (b) the record is accurate.  Ellyn Rivera MD  Internal Medicine    40 min spent face to face, of which >50% time spent on counseling/coordinating care exclusive of any procedure time

## 2020-02-07 NOTE — PATIENT INSTRUCTIONS
[facility] Medication Refill Request Information:  * Please contact your pharmacy regarding ANY request for medication refills.  ** [facility] Prescription Fax = [phone]  * Please allow 3 business days for routine medication refills.  * Please allow 5 business days for controlled substance medication refills.     [facility] Test Result notification information:  *You will be notified with in 7-10 days of your appointment day regarding the results of your test.  If you are on MyChart you will be notified as soon as the provider has reviewed the results and signed off on them.    [facility]: [phone]

## 2020-02-07 NOTE — NURSING NOTE
Chief Complaint   Patient presents with     Recheck Medication     Pt comes in for medication refills.         Emigdio Siddiqi, EMT on 2/7/2020 at 11:28 AM

## 2020-02-10 ENCOUNTER — HOSPITAL ENCOUNTER (INPATIENT)
Facility: CLINIC | Age: 50
LOS: 3 days | Discharge: HOME | DRG: 326 | End: 2020-02-13
Attending: STUDENT IN AN ORGANIZED HEALTH CARE EDUCATION/TRAINING PROGRAM | Admitting: STUDENT IN AN ORGANIZED HEALTH CARE EDUCATION/TRAINING PROGRAM
Payer: COMMERCIAL

## 2020-02-10 ENCOUNTER — APPOINTMENT (OUTPATIENT)
Dept: GENERAL RADIOLOGY | Facility: CLINIC | Age: 50
DRG: 326 | End: 2020-02-10
Attending: STUDENT IN AN ORGANIZED HEALTH CARE EDUCATION/TRAINING PROGRAM
Payer: COMMERCIAL

## 2020-02-10 ENCOUNTER — ANESTHESIA (OUTPATIENT)
Dept: SURGERY | Facility: CLINIC | Age: 50
DRG: 326 | End: 2020-02-10
Payer: COMMERCIAL

## 2020-02-10 DIAGNOSIS — K21.00 GASTROESOPHAGEAL REFLUX DISEASE WITH ESOPHAGITIS: ICD-10-CM

## 2020-02-10 DIAGNOSIS — K21.00 GASTROESOPHAGEAL REFLUX DISEASE WITH ESOPHAGITIS: Primary | ICD-10-CM

## 2020-02-10 PROBLEM — K44.9 HIATAL HERNIA: Status: ACTIVE | Noted: 2020-02-10

## 2020-02-10 LAB
ABO + RH BLD: NORMAL
ABO + RH BLD: NORMAL
BLD GP AB SCN SERPL QL: NORMAL
BLOOD BANK CMNT PATIENT-IMP: NORMAL
BLOOD BANK CMNT PATIENT-IMP: NORMAL
GLUCOSE BLDC GLUCOMTR-MCNC: 100 MG/DL (ref 70–99)
HCG UR QL: NEGATIVE
INTERPRETATION ECG - MUSE: NORMAL
RADIOLOGIST FLAGS: ABNORMAL
SPECIMEN EXP DATE BLD: NORMAL

## 2020-02-10 PROCEDURE — 25800030 ZZH RX IP 258 OP 636: Performed by: ANESTHESIOLOGY

## 2020-02-10 PROCEDURE — 25000125 ZZHC RX 250: Performed by: NURSE ANESTHETIST, CERTIFIED REGISTERED

## 2020-02-10 PROCEDURE — 12000001 ZZH R&B MED SURG/OB UMMC

## 2020-02-10 PROCEDURE — 36000062 ZZH SURGERY LEVEL 4 1ST 30 MIN - UMMC: Performed by: STUDENT IN AN ORGANIZED HEALTH CARE EDUCATION/TRAINING PROGRAM

## 2020-02-10 PROCEDURE — 0DV44ZZ RESTRICTION OF ESOPHAGOGASTRIC JUNCTION, PERCUTANEOUS ENDOSCOPIC APPROACH: ICD-10-PCS | Performed by: STUDENT IN AN ORGANIZED HEALTH CARE EDUCATION/TRAINING PROGRAM

## 2020-02-10 PROCEDURE — 0DH63UZ INSERTION OF FEEDING DEVICE INTO STOMACH, PERCUTANEOUS APPROACH: ICD-10-PCS | Performed by: STUDENT IN AN ORGANIZED HEALTH CARE EDUCATION/TRAINING PROGRAM

## 2020-02-10 PROCEDURE — 25000566 ZZH SEVOFLURANE, EA 15 MIN: Performed by: STUDENT IN AN ORGANIZED HEALTH CARE EDUCATION/TRAINING PROGRAM

## 2020-02-10 PROCEDURE — 93010 ELECTROCARDIOGRAM REPORT: CPT | Mod: 59 | Performed by: INTERNAL MEDICINE

## 2020-02-10 PROCEDURE — 37000008 ZZH ANESTHESIA TECHNICAL FEE, 1ST 30 MIN: Performed by: STUDENT IN AN ORGANIZED HEALTH CARE EDUCATION/TRAINING PROGRAM

## 2020-02-10 PROCEDURE — 40000986 XR CHEST PORT 1 VW

## 2020-02-10 PROCEDURE — 25000128 H RX IP 250 OP 636: Performed by: STUDENT IN AN ORGANIZED HEALTH CARE EDUCATION/TRAINING PROGRAM

## 2020-02-10 PROCEDURE — 25000128 H RX IP 250 OP 636: Performed by: NURSE ANESTHETIST, CERTIFIED REGISTERED

## 2020-02-10 PROCEDURE — 88302 TISSUE EXAM BY PATHOLOGIST: CPT | Performed by: STUDENT IN AN ORGANIZED HEALTH CARE EDUCATION/TRAINING PROGRAM

## 2020-02-10 PROCEDURE — 81025 URINE PREGNANCY TEST: CPT | Performed by: ANESTHESIOLOGY

## 2020-02-10 PROCEDURE — 37000009 ZZH ANESTHESIA TECHNICAL FEE, EACH ADDTL 15 MIN: Performed by: STUDENT IN AN ORGANIZED HEALTH CARE EDUCATION/TRAINING PROGRAM

## 2020-02-10 PROCEDURE — 36000064 ZZH SURGERY LEVEL 4 EA 15 ADDTL MIN - UMMC: Performed by: STUDENT IN AN ORGANIZED HEALTH CARE EDUCATION/TRAINING PROGRAM

## 2020-02-10 PROCEDURE — 25000128 H RX IP 250 OP 636: Performed by: ANESTHESIOLOGY

## 2020-02-10 PROCEDURE — 25000125 ZZHC RX 250: Performed by: STUDENT IN AN ORGANIZED HEALTH CARE EDUCATION/TRAINING PROGRAM

## 2020-02-10 PROCEDURE — 25800030 ZZH RX IP 258 OP 636: Performed by: STUDENT IN AN ORGANIZED HEALTH CARE EDUCATION/TRAINING PROGRAM

## 2020-02-10 PROCEDURE — 0BQT4ZZ REPAIR DIAPHRAGM, PERCUTANEOUS ENDOSCOPIC APPROACH: ICD-10-PCS | Performed by: STUDENT IN AN ORGANIZED HEALTH CARE EDUCATION/TRAINING PROGRAM

## 2020-02-10 PROCEDURE — 71000014 ZZH RECOVERY PHASE 1 LEVEL 2 FIRST HR: Performed by: STUDENT IN AN ORGANIZED HEALTH CARE EDUCATION/TRAINING PROGRAM

## 2020-02-10 PROCEDURE — 40000170 ZZH STATISTIC PRE-PROCEDURE ASSESSMENT II: Performed by: STUDENT IN AN ORGANIZED HEALTH CARE EDUCATION/TRAINING PROGRAM

## 2020-02-10 PROCEDURE — 71000015 ZZH RECOVERY PHASE 1 LEVEL 2 EA ADDTL HR: Performed by: STUDENT IN AN ORGANIZED HEALTH CARE EDUCATION/TRAINING PROGRAM

## 2020-02-10 PROCEDURE — 27210794 ZZH OR GENERAL SUPPLY STERILE: Performed by: STUDENT IN AN ORGANIZED HEALTH CARE EDUCATION/TRAINING PROGRAM

## 2020-02-10 PROCEDURE — 25800030 ZZH RX IP 258 OP 636: Performed by: NURSE ANESTHETIST, CERTIFIED REGISTERED

## 2020-02-10 PROCEDURE — 0DJ68ZZ INSPECTION OF STOMACH, VIA NATURAL OR ARTIFICIAL OPENING ENDOSCOPIC: ICD-10-PCS | Performed by: STUDENT IN AN ORGANIZED HEALTH CARE EDUCATION/TRAINING PROGRAM

## 2020-02-10 PROCEDURE — 00000146 ZZHCL STATISTIC GLUCOSE BY METER IP

## 2020-02-10 RX ORDER — GLYCOPYRROLATE 0.2 MG/ML
INJECTION, SOLUTION INTRAMUSCULAR; INTRAVENOUS PRN
Status: DISCONTINUED | OUTPATIENT
Start: 2020-02-10 | End: 2020-02-10

## 2020-02-10 RX ORDER — ONDANSETRON 2 MG/ML
INJECTION INTRAMUSCULAR; INTRAVENOUS PRN
Status: DISCONTINUED | OUTPATIENT
Start: 2020-02-10 | End: 2020-02-10

## 2020-02-10 RX ORDER — ONDANSETRON 2 MG/ML
4 INJECTION INTRAMUSCULAR; INTRAVENOUS EVERY 30 MIN PRN
Status: DISCONTINUED | OUTPATIENT
Start: 2020-02-10 | End: 2020-02-10 | Stop reason: HOSPADM

## 2020-02-10 RX ORDER — MEPERIDINE HYDROCHLORIDE 25 MG/ML
12.5 INJECTION INTRAMUSCULAR; INTRAVENOUS; SUBCUTANEOUS EVERY 5 MIN PRN
Status: DISCONTINUED | OUTPATIENT
Start: 2020-02-10 | End: 2020-02-10 | Stop reason: HOSPADM

## 2020-02-10 RX ORDER — LIDOCAINE HYDROCHLORIDE 20 MG/ML
INJECTION, SOLUTION INFILTRATION; PERINEURAL PRN
Status: DISCONTINUED | OUTPATIENT
Start: 2020-02-10 | End: 2020-02-10

## 2020-02-10 RX ORDER — BUPIVACAINE HYDROCHLORIDE 2.5 MG/ML
INJECTION, SOLUTION INFILTRATION; PERINEURAL PRN
Status: DISCONTINUED | OUTPATIENT
Start: 2020-02-10 | End: 2020-02-10 | Stop reason: HOSPADM

## 2020-02-10 RX ORDER — CEFAZOLIN SODIUM 1 G/3ML
1 INJECTION, POWDER, FOR SOLUTION INTRAMUSCULAR; INTRAVENOUS SEE ADMIN INSTRUCTIONS
Status: DISCONTINUED | OUTPATIENT
Start: 2020-02-10 | End: 2020-02-10 | Stop reason: HOSPADM

## 2020-02-10 RX ORDER — SALIVA STIMULANT COMB. NO.3
1 SPRAY, NON-AEROSOL (ML) MUCOUS MEMBRANE 4 TIMES DAILY PRN
Status: DISCONTINUED | OUTPATIENT
Start: 2020-02-10 | End: 2020-02-13 | Stop reason: HOSPADM

## 2020-02-10 RX ORDER — ONDANSETRON 4 MG/1
4 TABLET, ORALLY DISINTEGRATING ORAL EVERY 30 MIN PRN
Status: DISCONTINUED | OUTPATIENT
Start: 2020-02-10 | End: 2020-02-10 | Stop reason: HOSPADM

## 2020-02-10 RX ORDER — PROPOFOL 10 MG/ML
INJECTION, EMULSION INTRAVENOUS CONTINUOUS PRN
Status: DISCONTINUED | OUTPATIENT
Start: 2020-02-10 | End: 2020-02-10

## 2020-02-10 RX ORDER — DEXTROSE MONOHYDRATE, SODIUM CHLORIDE, AND POTASSIUM CHLORIDE 50; 1.49; 4.5 G/1000ML; G/1000ML; G/1000ML
INJECTION, SOLUTION INTRAVENOUS CONTINUOUS
Status: DISCONTINUED | OUTPATIENT
Start: 2020-02-10 | End: 2020-02-12

## 2020-02-10 RX ORDER — HYDRALAZINE HYDROCHLORIDE 20 MG/ML
INJECTION INTRAMUSCULAR; INTRAVENOUS PRN
Status: DISCONTINUED | OUTPATIENT
Start: 2020-02-10 | End: 2020-02-10

## 2020-02-10 RX ORDER — DEXAMETHASONE SODIUM PHOSPHATE 4 MG/ML
INJECTION, SOLUTION INTRA-ARTICULAR; INTRALESIONAL; INTRAMUSCULAR; INTRAVENOUS; SOFT TISSUE PRN
Status: DISCONTINUED | OUTPATIENT
Start: 2020-02-10 | End: 2020-02-10

## 2020-02-10 RX ORDER — LIDOCAINE 40 MG/G
CREAM TOPICAL
Status: DISCONTINUED | OUTPATIENT
Start: 2020-02-10 | End: 2020-02-10 | Stop reason: HOSPADM

## 2020-02-10 RX ORDER — HYDROMORPHONE HYDROCHLORIDE 1 MG/ML
.3-.5 INJECTION, SOLUTION INTRAMUSCULAR; INTRAVENOUS; SUBCUTANEOUS EVERY 5 MIN PRN
Status: DISCONTINUED | OUTPATIENT
Start: 2020-02-10 | End: 2020-02-10 | Stop reason: HOSPADM

## 2020-02-10 RX ORDER — PROPOFOL 10 MG/ML
INJECTION, EMULSION INTRAVENOUS PRN
Status: DISCONTINUED | OUTPATIENT
Start: 2020-02-10 | End: 2020-02-10

## 2020-02-10 RX ORDER — ONDANSETRON 2 MG/ML
4 INJECTION INTRAMUSCULAR; INTRAVENOUS EVERY 6 HOURS PRN
Status: DISCONTINUED | OUTPATIENT
Start: 2020-02-10 | End: 2020-02-13 | Stop reason: HOSPADM

## 2020-02-10 RX ORDER — NALOXONE HYDROCHLORIDE 0.4 MG/ML
.1-.4 INJECTION, SOLUTION INTRAMUSCULAR; INTRAVENOUS; SUBCUTANEOUS
Status: ACTIVE | OUTPATIENT
Start: 2020-02-10 | End: 2020-02-11

## 2020-02-10 RX ORDER — SODIUM CHLORIDE, SODIUM LACTATE, POTASSIUM CHLORIDE, CALCIUM CHLORIDE 600; 310; 30; 20 MG/100ML; MG/100ML; MG/100ML; MG/100ML
INJECTION, SOLUTION INTRAVENOUS CONTINUOUS
Status: DISCONTINUED | OUTPATIENT
Start: 2020-02-10 | End: 2020-02-10 | Stop reason: HOSPADM

## 2020-02-10 RX ORDER — LABETALOL 20 MG/4 ML (5 MG/ML) INTRAVENOUS SYRINGE
10
Status: COMPLETED | OUTPATIENT
Start: 2020-02-10 | End: 2020-02-10

## 2020-02-10 RX ORDER — BUPIVACAINE HYDROCHLORIDE 2.5 MG/ML
INJECTION, SOLUTION EPIDURAL; INFILTRATION; INTRACAUDAL PRN
Status: DISCONTINUED | OUTPATIENT
Start: 2020-02-10 | End: 2020-02-10

## 2020-02-10 RX ORDER — FENTANYL CITRATE 50 UG/ML
25-50 INJECTION, SOLUTION INTRAMUSCULAR; INTRAVENOUS
Status: DISCONTINUED | OUTPATIENT
Start: 2020-02-10 | End: 2020-02-10 | Stop reason: HOSPADM

## 2020-02-10 RX ORDER — FENTANYL CITRATE 50 UG/ML
INJECTION, SOLUTION INTRAMUSCULAR; INTRAVENOUS PRN
Status: DISCONTINUED | OUTPATIENT
Start: 2020-02-10 | End: 2020-02-10

## 2020-02-10 RX ORDER — CEFAZOLIN SODIUM 2 G/100ML
2 INJECTION, SOLUTION INTRAVENOUS
Status: COMPLETED | OUTPATIENT
Start: 2020-02-10 | End: 2020-02-10

## 2020-02-10 RX ORDER — NALOXONE HYDROCHLORIDE 0.4 MG/ML
.1-.4 INJECTION, SOLUTION INTRAMUSCULAR; INTRAVENOUS; SUBCUTANEOUS
Status: DISCONTINUED | OUTPATIENT
Start: 2020-02-10 | End: 2020-02-11

## 2020-02-10 RX ORDER — PROPRANOLOL HYDROCHLORIDE 120 MG/1
120 CAPSULE, EXTENDED RELEASE ORAL DAILY
Status: DISCONTINUED | OUTPATIENT
Start: 2020-02-11 | End: 2020-02-11

## 2020-02-10 RX ADMIN — HYDRALAZINE HYDROCHLORIDE 5 MG: 20 INJECTION INTRAMUSCULAR; INTRAVENOUS at 09:07

## 2020-02-10 RX ADMIN — FENTANYL CITRATE 50 MCG: 50 INJECTION, SOLUTION INTRAMUSCULAR; INTRAVENOUS at 08:57

## 2020-02-10 RX ADMIN — LIDOCAINE HYDROCHLORIDE 90 MG: 20 INJECTION, SOLUTION INFILTRATION; PERINEURAL at 07:46

## 2020-02-10 RX ADMIN — ROCURONIUM BROMIDE 30 MG: 10 INJECTION INTRAVENOUS at 09:00

## 2020-02-10 RX ADMIN — PHENYLEPHRINE HYDROCHLORIDE 100 MCG: 10 INJECTION INTRAVENOUS at 10:33

## 2020-02-10 RX ADMIN — HYDROMORPHONE HYDROCHLORIDE 0.3 MG: 1 INJECTION, SOLUTION INTRAMUSCULAR; INTRAVENOUS; SUBCUTANEOUS at 13:50

## 2020-02-10 RX ADMIN — CEFAZOLIN SODIUM 2 G: 2 INJECTION, SOLUTION INTRAVENOUS at 07:59

## 2020-02-10 RX ADMIN — CEFAZOLIN SODIUM 1 G: 2 INJECTION, SOLUTION INTRAVENOUS at 11:57

## 2020-02-10 RX ADMIN — POTASSIUM CHLORIDE, DEXTROSE MONOHYDRATE AND SODIUM CHLORIDE: 150; 5; 450 INJECTION, SOLUTION INTRAVENOUS at 23:59

## 2020-02-10 RX ADMIN — FENTANYL CITRATE 50 MCG: 50 INJECTION, SOLUTION INTRAMUSCULAR; INTRAVENOUS at 12:56

## 2020-02-10 RX ADMIN — FENTANYL CITRATE 25 MCG: 50 INJECTION INTRAMUSCULAR; INTRAVENOUS at 14:32

## 2020-02-10 RX ADMIN — PROPOFOL 40 MG: 10 INJECTION, EMULSION INTRAVENOUS at 08:54

## 2020-02-10 RX ADMIN — FENTANYL CITRATE 50 MCG: 50 INJECTION, SOLUTION INTRAMUSCULAR; INTRAVENOUS at 08:26

## 2020-02-10 RX ADMIN — FENTANYL CITRATE 25 MCG: 50 INJECTION INTRAMUSCULAR; INTRAVENOUS at 14:44

## 2020-02-10 RX ADMIN — DEXAMETHASONE SODIUM PHOSPHATE 8 MG: 4 INJECTION, SOLUTION INTRA-ARTICULAR; INTRALESIONAL; INTRAMUSCULAR; INTRAVENOUS; SOFT TISSUE at 08:04

## 2020-02-10 RX ADMIN — FENTANYL CITRATE 25 MCG: 50 INJECTION INTRAMUSCULAR; INTRAVENOUS at 14:46

## 2020-02-10 RX ADMIN — ENOXAPARIN SODIUM 40 MG: 40 INJECTION SUBCUTANEOUS at 07:58

## 2020-02-10 RX ADMIN — SODIUM CHLORIDE, POTASSIUM CHLORIDE, SODIUM LACTATE AND CALCIUM CHLORIDE: 600; 310; 30; 20 INJECTION, SOLUTION INTRAVENOUS at 07:37

## 2020-02-10 RX ADMIN — CEFAZOLIN SODIUM 1 G: 2 INJECTION, SOLUTION INTRAVENOUS at 09:59

## 2020-02-10 RX ADMIN — ROCURONIUM BROMIDE 20 MG: 10 INJECTION INTRAVENOUS at 10:18

## 2020-02-10 RX ADMIN — DEXAMETHASONE SODIUM PHOSPHATE 2 ML: 4 INJECTION, SOLUTION INTRA-ARTICULAR; INTRALESIONAL; INTRAMUSCULAR; INTRAVENOUS; SOFT TISSUE at 14:40

## 2020-02-10 RX ADMIN — PHENYLEPHRINE HYDROCHLORIDE 100 MCG: 10 INJECTION INTRAVENOUS at 10:35

## 2020-02-10 RX ADMIN — ROCURONIUM BROMIDE 20 MG: 10 INJECTION INTRAVENOUS at 08:32

## 2020-02-10 RX ADMIN — PROPOFOL 160 MG: 10 INJECTION, EMULSION INTRAVENOUS at 07:47

## 2020-02-10 RX ADMIN — POTASSIUM CHLORIDE, DEXTROSE MONOHYDRATE AND SODIUM CHLORIDE: 150; 5; 450 INJECTION, SOLUTION INTRAVENOUS at 13:25

## 2020-02-10 RX ADMIN — HYDROMORPHONE HYDROCHLORIDE 0.5 MG: 1 INJECTION, SOLUTION INTRAMUSCULAR; INTRAVENOUS; SUBCUTANEOUS at 14:19

## 2020-02-10 RX ADMIN — HYDROMORPHONE HYDROCHLORIDE 0.2 MG: 1 INJECTION, SOLUTION INTRAMUSCULAR; INTRAVENOUS; SUBCUTANEOUS at 13:20

## 2020-02-10 RX ADMIN — FENTANYL CITRATE 50 MCG: 50 INJECTION, SOLUTION INTRAMUSCULAR; INTRAVENOUS at 08:51

## 2020-02-10 RX ADMIN — ROCURONIUM BROMIDE 20 MG: 10 INJECTION INTRAVENOUS at 11:58

## 2020-02-10 RX ADMIN — BUPIVACAINE HYDROCHLORIDE 60 ML: 2.5 INJECTION, SOLUTION EPIDURAL; INFILTRATION; INTRACAUDAL; PERINEURAL at 14:43

## 2020-02-10 RX ADMIN — GLYCOPYRROLATE 0.2 MG: 0.2 INJECTION, SOLUTION INTRAMUSCULAR; INTRAVENOUS at 07:43

## 2020-02-10 RX ADMIN — SODIUM CHLORIDE, POTASSIUM CHLORIDE, SODIUM LACTATE AND CALCIUM CHLORIDE: 600; 310; 30; 20 INJECTION, SOLUTION INTRAVENOUS at 07:55

## 2020-02-10 RX ADMIN — Medication: at 15:20

## 2020-02-10 RX ADMIN — HYDROMORPHONE HYDROCHLORIDE 0.5 MG: 1 INJECTION, SOLUTION INTRAMUSCULAR; INTRAVENOUS; SUBCUTANEOUS at 11:55

## 2020-02-10 RX ADMIN — ROCURONIUM BROMIDE 30 MG: 10 INJECTION INTRAVENOUS at 09:41

## 2020-02-10 RX ADMIN — ONDANSETRON 4 MG: 2 INJECTION INTRAMUSCULAR; INTRAVENOUS at 12:19

## 2020-02-10 RX ADMIN — ROCURONIUM BROMIDE 80 MG: 10 INJECTION INTRAVENOUS at 07:47

## 2020-02-10 RX ADMIN — HYDRALAZINE HYDROCHLORIDE 5 MG: 20 INJECTION INTRAMUSCULAR; INTRAVENOUS at 09:21

## 2020-02-10 RX ADMIN — HYDROMORPHONE HYDROCHLORIDE 1 MG: 1 INJECTION, SOLUTION INTRAMUSCULAR; INTRAVENOUS; SUBCUTANEOUS at 10:25

## 2020-02-10 RX ADMIN — FENTANYL CITRATE 50 MCG: 50 INJECTION INTRAMUSCULAR; INTRAVENOUS at 14:50

## 2020-02-10 RX ADMIN — ROCURONIUM BROMIDE 20 MG: 10 INJECTION INTRAVENOUS at 10:26

## 2020-02-10 RX ADMIN — ROCURONIUM BROMIDE 20 MG: 10 INJECTION INTRAVENOUS at 11:12

## 2020-02-10 RX ADMIN — LABETALOL 20 MG/4 ML (5 MG/ML) INTRAVENOUS SYRINGE 10 MG: at 13:25

## 2020-02-10 RX ADMIN — SUGAMMADEX 200 MG: 100 INJECTION, SOLUTION INTRAVENOUS at 12:26

## 2020-02-10 RX ADMIN — DEXMEDETOMIDINE HYDROCHLORIDE 40 MCG: 100 INJECTION, SOLUTION INTRAVENOUS at 14:34

## 2020-02-10 RX ADMIN — MIDAZOLAM 2 MG: 1 INJECTION INTRAMUSCULAR; INTRAVENOUS at 07:37

## 2020-02-10 RX ADMIN — FENTANYL CITRATE 100 MCG: 50 INJECTION, SOLUTION INTRAMUSCULAR; INTRAVENOUS at 07:46

## 2020-02-10 RX ADMIN — PROPOFOL 10 MCG/KG/MIN: 10 INJECTION, EMULSION INTRAVENOUS at 07:58

## 2020-02-10 ASSESSMENT — ACTIVITIES OF DAILY LIVING (ADL)
COGNITION: 0 - NO COGNITION ISSUES REPORTED
DRESS: 0-->INDEPENDENT
AMBULATION: 1-->ASSISTIVE EQUIPMENT
TRANSFERRING: 0-->INDEPENDENT
RETIRED_EATING: 0-->INDEPENDENT
TOILETING: 0-->INDEPENDENT
FALL_HISTORY_WITHIN_LAST_SIX_MONTHS: NO
WHICH_OF_THE_ABOVE_FUNCTIONAL_RISKS_HAD_A_RECENT_ONSET_OR_CHANGE?: AMBULATION
ADLS_ACUITY_SCORE: 16
BATHING: 0-->INDEPENDENT
RETIRED_COMMUNICATION: 0-->UNDERSTANDS/COMMUNICATES WITHOUT DIFFICULTY
SWALLOWING: 0-->SWALLOWS FOODS/LIQUIDS WITHOUT DIFFICULTY

## 2020-02-10 NOTE — PROGRESS NOTES
Dr. Milligan notified concerning CXR completed-MD at bedside ok'd to transfer when ready.

## 2020-02-10 NOTE — PROGRESS NOTES
Dr. Alcazar notified concerning pt c/o worsening pain (shoulder/back/incision) -PRN's not responding.

## 2020-02-10 NOTE — BRIEF OP NOTE
[facility], [place]    Brief Operative Note    Pre-operative diagnosis: Gastroesophageal reflux disease with esophagitis [K21.0]  Post-operative diagnosis Same as pre-operative diagnosis    Procedure: Procedure(s):  LAPAROSCOPIC HIATAL HERNIA REPAIR, NISSEN FUNDOPLICATION, ESOPHOGASTRODUODENOSCOPY, PEG TUBE PLACEMENT.  Surgeon: Surgeon(s) and Role:     * Alana Mack MD - Primary     * Dalton Milligan MD - Fellow - Assisting  Anesthesia: General   Estimated blood loss: Less than 50 ml  Drains:   **L Chest Tube  **Gastrostomy tube  Specimens:   ID Type Source Tests Collected by Time Destination   A : Hiatal Hernia Sac Tissue Hernia Sac SURGICAL PATHOLOGY EXAM Alana Mack MD 2/10/2020 10:20 AM      Findings:   None.  Complications: None.  Implants: * No implants in log *

## 2020-02-10 NOTE — OR NURSING
Bilateral TAP block done in PACU, patient tolerated procedure with IV fentanyl (see MAR). Vital signs stable. See flowsheets.

## 2020-02-10 NOTE — ANESTHESIA PROCEDURE NOTES
Peripheral Nerve Block Procedure Note    Staff:     Anesthesiologist:  Clark Eaton MD    Resident/CRNA:  Cynthia Wilhelm MD  Location: PACU  Procedure Start/Stop TImes:      2/10/2020 2:35 PM     2/10/2020 2:45 PM    patient identified, IV checked, site marked, risks and benefits discussed, informed consent, monitors and equipment checked, pre-op evaluation, at physician/surgeon's request and post-op pain management      Correct Patient: Yes      Correct Position: Yes      Correct Site: Yes      Correct Procedure: Yes      Correct Laterality:  Yes    Site Marked:  Yes  Procedure details:     Procedure:  Paravertebral    ASA:  3    Laterality:  Bilateral    Position:  Supine    Sterile Prep: chloraprep, mask and sterile gloves      Needle:  Touhy needle    Ultrasound: Yes      Ultrasound used to identify targeted nerve, plexus, or vascular structure and placed a needle adjacent to it      Permanent Image entered into patiient's record      Abnormal pain on injection: No      Blood Aspirated: No      Paresthesias:  No    Bleeding at site: No      Bolus via:  Needle    Infusion Method:  Single Shot    Blood aspirated via catheter: No      Complications:  None

## 2020-02-10 NOTE — ANESTHESIA POSTPROCEDURE EVALUATION
Anesthesia POST Procedure Evaluation    Patient: Ada Welch   MRN:     [id number] Gender:   female   Age:    49 year old :      1970        Preoperative Diagnosis: Gastroesophageal reflux disease with esophagitis [K21.0]   Procedure(s):  LAPAROSCOPIC HIATAL HERNIA REPAIR, NISSEN FUNDOPLICATION, ESOPHOGASTRODUODENOSCOPY, PEG TUBE PLACEMENT.   Postop Comments: No value filed.       Anesthesia Type:  Not documented  No value filed.    Reportable Event: NO     PAIN: Complex/Difficult     Events: Difficult postop Pain Control; Exacerbation of chronic pain     Interventions: Opioids; PCA; Multimodal; Rescue block; Pain Team involved (Rescue TAP block, but pt with mainly visceral and left chest tube pain. )   Sign Out status: Comfortable, Well controlled pain     PONV: No PONV   Sign Out status:  No Nausea or Vomiting     Neuro/Psych: Uneventful perioperative course   Sign Out Status: Preoperative baseline; Age appropriate mentation     Airway/Resp.: Uneventful perioperative course   Sign Out Status: Non labored breathing, age appropriate RR; Resp. Status within EXPECTED Parameters     CV: Uneventful perioperative course   Sign Out status: Appropriate BP and perfusion indices; Appropriate HR/Rhythm     Disposition:   Sign Out in:  PACU  Disposition:  Phase II; Home  Recovery Course: Uneventful  Follow-Up: Not required     Comments/Narrative:  No noted anesthetic complications.  Patient satisfied with anesthetic.             Last Anesthesia Record Vitals:  CRNA VITALS  2/10/2020 1217 - 2/10/2020 1317      2/10/2020             Resp Rate (set):  10          Last PACU Vitals:  Vitals Value Taken Time   /95 2/10/2020  2:55 PM   Temp 36.5  C (97.7  F) 2/10/2020  2:45 PM   Pulse 68 2/10/2020  2:55 PM   Resp 10 2/10/2020  2:45 PM   SpO2 95 % 2/10/2020  2:57 PM   Temp src     NIBP     Pulse     SpO2     Resp     Temp     Ht Rate     Temp 2     Vitals shown include unvalidated device data.      Electronically  Signed By: Norris Jones MD, February 10, 2020, 2:58 PM

## 2020-02-10 NOTE — ANESTHESIA CARE TRANSFER NOTE
Patient: Ada Welch    Procedure(s):  LAPAROSCOPIC HIATAL HERNIA REPAIR, NISSEN FUNDOPLICATION, ESOPHOGASTRODUODENOSCOPY, PEG TUBE PLACEMENT.    Diagnosis: Gastroesophageal reflux disease with esophagitis [K21.0]  Diagnosis Additional Information: No value filed.    Anesthesia Type:   No value filed.     Note:  Airway :Nasal Cannula  Patient transferred to:PACU  Comments:   -on 4L O2 via NC, Pt Spont  breathing, awake & alert, monitors placed, B/P elevated all other VSS, RN at bedside, no airway      Vitals: (Last set prior to Anesthesia Care Transfer)    CRNA VITALS  2/10/2020 1217 - 2/10/2020 1300      2/10/2020             Resp Rate (set):  10                Electronically Signed By: FIDELINA Powell CRNA  February 10, 2020  1:00 PM

## 2020-02-10 NOTE — ADDENDUM NOTE
Addendum  created 02/10/20 1507 by Cynthia Wilhelm MD    Child order released for a procedure order, Clinical Note Signed, Intraprocedure Blocks edited, Intraprocedure Meds edited, LDA created via procedure documentation

## 2020-02-11 ENCOUNTER — APPOINTMENT (OUTPATIENT)
Dept: GENERAL RADIOLOGY | Facility: CLINIC | Age: 50
DRG: 326 | End: 2020-02-11
Attending: STUDENT IN AN ORGANIZED HEALTH CARE EDUCATION/TRAINING PROGRAM
Payer: COMMERCIAL

## 2020-02-11 ENCOUNTER — APPOINTMENT (OUTPATIENT)
Dept: PHYSICAL THERAPY | Facility: CLINIC | Age: 50
DRG: 326 | End: 2020-02-11
Attending: STUDENT IN AN ORGANIZED HEALTH CARE EDUCATION/TRAINING PROGRAM
Payer: COMMERCIAL

## 2020-02-11 LAB
ANION GAP SERPL CALCULATED.3IONS-SCNC: 6 MMOL/L (ref 3–14)
BUN SERPL-MCNC: 7 MG/DL (ref 7–30)
CALCIUM SERPL-MCNC: 8 MG/DL (ref 8.5–10.1)
CHLORIDE SERPL-SCNC: 106 MMOL/L (ref 94–109)
CO2 SERPL-SCNC: 25 MMOL/L (ref 20–32)
CREAT SERPL-MCNC: 0.56 MG/DL (ref 0.52–1.04)
ERYTHROCYTE [DISTWIDTH] IN BLOOD BY AUTOMATED COUNT: 14.8 % (ref 10–15)
GFR SERPL CREATININE-BSD FRML MDRD: >90 ML/MIN/{1.73_M2}
GLUCOSE BLDC GLUCOMTR-MCNC: 124 MG/DL (ref 70–99)
GLUCOSE BLDC GLUCOMTR-MCNC: 125 MG/DL (ref 70–99)
GLUCOSE BLDC GLUCOMTR-MCNC: 140 MG/DL (ref 70–99)
GLUCOSE SERPL-MCNC: 131 MG/DL (ref 70–99)
HCT VFR BLD AUTO: 33 % (ref 35–47)
HGB BLD-MCNC: 10.1 G/DL (ref 11.7–15.7)
MAGNESIUM SERPL-MCNC: 2.2 MG/DL (ref 1.6–2.3)
MCH RBC QN AUTO: 26.4 PG (ref 26.5–33)
MCHC RBC AUTO-ENTMCNC: 30.6 G/DL (ref 31.5–36.5)
MCV RBC AUTO: 86 FL (ref 78–100)
PLATELET # BLD AUTO: 210 10E9/L (ref 150–450)
POTASSIUM SERPL-SCNC: 3.9 MMOL/L (ref 3.4–5.3)
RBC # BLD AUTO: 3.82 10E12/L (ref 3.8–5.2)
SODIUM SERPL-SCNC: 138 MMOL/L (ref 133–144)
WBC # BLD AUTO: 15.4 10E9/L (ref 4–11)

## 2020-02-11 PROCEDURE — 99207 ZZC CONSULT E&M CHANGED TO INITIAL LEVEL: CPT | Performed by: NURSE PRACTITIONER

## 2020-02-11 PROCEDURE — 25000128 H RX IP 250 OP 636: Performed by: STUDENT IN AN ORGANIZED HEALTH CARE EDUCATION/TRAINING PROGRAM

## 2020-02-11 PROCEDURE — 71045 X-RAY EXAM CHEST 1 VIEW: CPT

## 2020-02-11 PROCEDURE — 00000146 ZZHCL STATISTIC GLUCOSE BY METER IP

## 2020-02-11 PROCEDURE — 40000556 ZZH STATISTIC PERIPHERAL IV START W US GUIDANCE

## 2020-02-11 PROCEDURE — 25000132 ZZH RX MED GY IP 250 OP 250 PS 637

## 2020-02-11 PROCEDURE — 12000001 ZZH R&B MED SURG/OB UMMC

## 2020-02-11 PROCEDURE — 40000894 ZZH STATISTIC OT IP EVAL DEFER

## 2020-02-11 PROCEDURE — 25000132 ZZH RX MED GY IP 250 OP 250 PS 637: Performed by: STUDENT IN AN ORGANIZED HEALTH CARE EDUCATION/TRAINING PROGRAM

## 2020-02-11 PROCEDURE — 99222 1ST HOSP IP/OBS MODERATE 55: CPT | Performed by: NURSE PRACTITIONER

## 2020-02-11 PROCEDURE — 97116 GAIT TRAINING THERAPY: CPT | Mod: GP

## 2020-02-11 PROCEDURE — 25800030 ZZH RX IP 258 OP 636: Performed by: STUDENT IN AN ORGANIZED HEALTH CARE EDUCATION/TRAINING PROGRAM

## 2020-02-11 PROCEDURE — 97161 PT EVAL LOW COMPLEX 20 MIN: CPT | Mod: GP

## 2020-02-11 PROCEDURE — 36415 COLL VENOUS BLD VENIPUNCTURE: CPT | Performed by: STUDENT IN AN ORGANIZED HEALTH CARE EDUCATION/TRAINING PROGRAM

## 2020-02-11 PROCEDURE — 71046 X-RAY EXAM CHEST 2 VIEWS: CPT

## 2020-02-11 PROCEDURE — 85027 COMPLETE CBC AUTOMATED: CPT | Performed by: STUDENT IN AN ORGANIZED HEALTH CARE EDUCATION/TRAINING PROGRAM

## 2020-02-11 PROCEDURE — 25000128 H RX IP 250 OP 636: Performed by: PHYSICIAN ASSISTANT

## 2020-02-11 PROCEDURE — 83735 ASSAY OF MAGNESIUM: CPT | Performed by: STUDENT IN AN ORGANIZED HEALTH CARE EDUCATION/TRAINING PROGRAM

## 2020-02-11 PROCEDURE — 25000132 ZZH RX MED GY IP 250 OP 250 PS 637: Performed by: PHYSICIAN ASSISTANT

## 2020-02-11 PROCEDURE — 80048 BASIC METABOLIC PNL TOTAL CA: CPT | Performed by: STUDENT IN AN ORGANIZED HEALTH CARE EDUCATION/TRAINING PROGRAM

## 2020-02-11 RX ORDER — LIDOCAINE 4 G/G
3 PATCH TOPICAL
Status: DISCONTINUED | OUTPATIENT
Start: 2020-02-11 | End: 2020-02-11

## 2020-02-11 RX ORDER — DULOXETIN HYDROCHLORIDE 60 MG/1
60 CAPSULE, DELAYED RELEASE ORAL EVERY MORNING
Status: DISCONTINUED | OUTPATIENT
Start: 2020-02-11 | End: 2020-02-13 | Stop reason: HOSPADM

## 2020-02-11 RX ORDER — BUTALBITAL, ACETAMINOPHEN AND CAFFEINE 50; 325; 40 MG/1; MG/1; MG/1
1 TABLET ORAL ONCE
Status: COMPLETED | OUTPATIENT
Start: 2020-02-11 | End: 2020-02-11

## 2020-02-11 RX ORDER — RIZATRIPTAN BENZOATE 5 MG/1
5 TABLET, ORALLY DISINTEGRATING ORAL ONCE
Status: COMPLETED | OUTPATIENT
Start: 2020-02-11 | End: 2020-02-11

## 2020-02-11 RX ORDER — LIDOCAINE 4 G/G
1-3 PATCH TOPICAL
Status: DISCONTINUED | OUTPATIENT
Start: 2020-02-11 | End: 2020-02-13 | Stop reason: HOSPADM

## 2020-02-11 RX ORDER — NALOXONE HYDROCHLORIDE 0.4 MG/ML
.1-.4 INJECTION, SOLUTION INTRAMUSCULAR; INTRAVENOUS; SUBCUTANEOUS
Status: DISCONTINUED | OUTPATIENT
Start: 2020-02-11 | End: 2020-02-13 | Stop reason: HOSPADM

## 2020-02-11 RX ORDER — KETOROLAC TROMETHAMINE 15 MG/ML
15 INJECTION, SOLUTION INTRAMUSCULAR; INTRAVENOUS EVERY 6 HOURS
Status: DISPENSED | OUTPATIENT
Start: 2020-02-11 | End: 2020-02-12

## 2020-02-11 RX ORDER — RIZATRIPTAN BENZOATE 5 MG/1
5 TABLET, ORALLY DISINTEGRATING ORAL DAILY PRN
Status: DISCONTINUED | OUTPATIENT
Start: 2020-02-11 | End: 2020-02-11

## 2020-02-11 RX ORDER — BUPRENORPHINE 10 UG/H
1 PATCH TRANSDERMAL WEEKLY
Status: DISCONTINUED | OUTPATIENT
Start: 2020-02-15 | End: 2020-02-13 | Stop reason: HOSPADM

## 2020-02-11 RX ORDER — SUMATRIPTAN 6 MG/.5ML
6 INJECTION, SOLUTION SUBCUTANEOUS EVERY 12 HOURS PRN
Status: DISCONTINUED | OUTPATIENT
Start: 2020-02-11 | End: 2020-02-11

## 2020-02-11 RX ORDER — RIZATRIPTAN BENZOATE 5 MG/1
5 TABLET, ORALLY DISINTEGRATING ORAL
Status: COMPLETED | OUTPATIENT
Start: 2020-02-11 | End: 2020-02-11

## 2020-02-11 RX ORDER — DULOXETIN HYDROCHLORIDE 30 MG/1
30 CAPSULE, DELAYED RELEASE ORAL EVERY EVENING
Status: DISCONTINUED | OUTPATIENT
Start: 2020-02-11 | End: 2020-02-13 | Stop reason: HOSPADM

## 2020-02-11 RX ADMIN — PROPRANOLOL HYDROCHLORIDE 120 MG: 120 CAPSULE, EXTENDED RELEASE ORAL at 09:02

## 2020-02-11 RX ADMIN — Medication: at 17:30

## 2020-02-11 RX ADMIN — Medication: at 16:42

## 2020-02-11 RX ADMIN — RIZATRIPTAN BENZOATE 5 MG: 5 TABLET, ORALLY DISINTEGRATING ORAL at 15:37

## 2020-02-11 RX ADMIN — RIZATRIPTAN BENZOATE 5 MG: 5 TABLET, ORALLY DISINTEGRATING ORAL at 05:31

## 2020-02-11 RX ADMIN — ACETAMINOPHEN 975 MG: 325 SOLUTION ORAL at 21:27

## 2020-02-11 RX ADMIN — Medication 1 SPRAY: at 05:30

## 2020-02-11 RX ADMIN — KETOROLAC TROMETHAMINE 15 MG: 15 INJECTION, SOLUTION INTRAMUSCULAR; INTRAVENOUS at 15:09

## 2020-02-11 RX ADMIN — TIZANIDINE 4 MG: 4 TABLET ORAL at 17:30

## 2020-02-11 RX ADMIN — DULOXETINE HYDROCHLORIDE 60 MG: 60 CAPSULE, DELAYED RELEASE ORAL at 13:02

## 2020-02-11 RX ADMIN — DULOXETINE HYDROCHLORIDE 30 MG: 30 CAPSULE, DELAYED RELEASE ORAL at 21:35

## 2020-02-11 RX ADMIN — KETOROLAC TROMETHAMINE 15 MG: 15 INJECTION, SOLUTION INTRAMUSCULAR; INTRAVENOUS at 08:44

## 2020-02-11 RX ADMIN — KETOROLAC TROMETHAMINE 15 MG: 15 INJECTION, SOLUTION INTRAMUSCULAR; INTRAVENOUS at 21:29

## 2020-02-11 RX ADMIN — BUTALBITAL, ACETAMINOPHEN, AND CAFFEINE 1 TABLET: 50; 325; 40 TABLET ORAL at 23:26

## 2020-02-11 RX ADMIN — SENNOSIDES 5 ML: 8.8 SYRUP ORAL at 21:28

## 2020-02-11 RX ADMIN — POTASSIUM CHLORIDE, DEXTROSE MONOHYDRATE AND SODIUM CHLORIDE: 150; 5; 450 INJECTION, SOLUTION INTRAVENOUS at 13:03

## 2020-02-11 RX ADMIN — ACETAMINOPHEN 975 MG: 325 SOLUTION ORAL at 13:01

## 2020-02-11 RX ADMIN — LIDOCAINE 1 PATCH: 560 PATCH PERCUTANEOUS; TOPICAL; TRANSDERMAL at 21:35

## 2020-02-11 RX ADMIN — ENOXAPARIN SODIUM 40 MG: 40 INJECTION SUBCUTANEOUS at 08:44

## 2020-02-11 RX ADMIN — TIZANIDINE 4 MG: 4 TABLET ORAL at 23:26

## 2020-02-11 ASSESSMENT — ACTIVITIES OF DAILY LIVING (ADL)
ADLS_ACUITY_SCORE: 15
ADLS_ACUITY_SCORE: 14
ADLS_ACUITY_SCORE: 15

## 2020-02-11 NOTE — CONSULTS
Inpatient Pain Management Service: Consultation      DATE OF CONSULT: February 11, 2020       REASON FOR PAIN CONSULTATION:  Ada Welch is a 49 year old female I am seeing in consultation at the request of DRE De Jesus PA-C (Thoracic Surgery Service) for evaluation and recommendations for her postoperpative pain management.       CHIEF PAIN COMPLAINT:abdominal pain and migraine headache      ASSESSMENT: 40 year old female Gastroesophageal reflux disease with esophagitis, hiatal hernia who is now POD #1 s/p LAPAROSCOPIC HIATAL HERNIA REPAIR, NISSEN FUNDOPLICATION, ESOPHOGASTRODUODENOSCOPY, PEG TUBE PLACEMENT by Dr Mack on 2/10/2020. Preoperative single shot bilateral paravertebral nerve block with PF bupivacaine 0.25% 60 mL, dexmedetomidine 40 mcg, dexamethasone 8 mg.      # Acute postoperative abdominal pain     Dilaudid PCA 0.3 mg dose Q 10 min lockout, used 3.3 mg overnight and 6.6 mg (average ~ 0.8mg/hr) over past 8 hrs     # Chronic low back pain x 25 years with previous L4-S1 fusion TLIF on 12/9/13, worsening back pain after OR and subsequent removal of posterior instrumentation. Follows with Dr Martinez at [facility] for the past couple years which has dramatically improved her functional capacity over time. Patient is prescribed:     Butrans patch 10 mcg/hr - replace 1 patch every Saturday this was last replaced: Saturday 2/8.      tramadol 50 mg PO q6hr PRN (usually takes 2 doses/day), and oxycodone 2.5 mg PO q6hr PRN (uses sparingly)    acetaminophen PRN     tizanidine 4 mg PO QID PRN     Fioricet 1 tab q4hr PRN.  Patient states it is the only medication that works for her headaches.      amitriptyline 10 mg PO at bedtime PRN sleep    # Tom Danlos    # Chronic migraine headaches, postop mixed headache - migraine and muscle tension type    # Chiari malformation - no surgical intervention for this    # Autonomic dysfunction - BP and HR fluctuations    # Urinary  urgency and incontinence      Primary Care Provider: Ellyn Rivera  Chronic Pain Provider: ТАТЬЯНА Martinez MD    TREATMENT RECOMMENDATIONS/PLAN:   1. Order buprenorphine (Butrans) 10 mcg/hr patch, change patch once weekly (every Saturday).  Patient currently has a patch in place right deltoid that she placed on Saturday 2/8.  Do not order this medication at discharge since it is prescribed by her pain specialist.  Buprenorphine is a mu-receptor partial agonist that tightly binds the receptor and may block the analgesic effects of pure agonist opioids so adjustment in PCA may be needed.    2. Continue Dilaudid PCA, can increase dose to range of 0.2-0.4 mg Q 10 min lockout.  Based on patient's current hourly use without adverse side effect, can increase dose to 0.4 mg now if CT remains in place.     Continue PCA while CT in place     Do not order oral opioid analgesics while on PCA.  3. Start tizanidine 4 mg PO QID PRN muscle spasm as soon as available from pharmacy     If unable to take PO, switch to methocarbamol (Robaxin) 500 mg IV Q 6 hrs PRN muscle spasm  4. Fiorocet, give 1 tab now for migraine headache if okay with Thoracic Surgery Service. This is a home med for patient who states it is the only thing that works for her migraines.  Minimize use because butalbital has hyperalgesic properties   5. Continue ketorolac (Toradol) 15 mg IV  Q 6 hrs as ordered by Thoracic Service.  Max of 72 hours  6. Continue acetaminophen (Tylenol) 975 mg PO Q 8 hrs as ordered.    7. Lidocaine 4 % patch, max 3 patches at one time to painful areas of skin for postop pain and posterior neck for headache that likely has muscle tension component  8. If headache persists and current measures ineffective, tomorrow will consider subocciptal nerve blocks    9. Other:  Ketamine - patient has issues with autonomic dysfunction, spontaneous intracranial hypotension and non-surgically corrected chiari malformation, so would initially avoid  ketamine and attempt to manage pain with other means first.   Gabapentin - did not tolerate in the past, patient unsure of rxn  Pregabalin - did not tolerate in the past, patient unsure of rxn    Pain Service will Continue to follow at this time.     Recommendations were discussed with DRE De Jesus PA-C  Plan was reviewed by the Pain Service staff Dr NUHA Eaton MD    Thank you for consulting the Inpatient Pain Management Service.   The above recommendations are to be acted upon at the primary team s discretion.    To reach us:  Mon - Friday 8 AM - 3 PM: Pager [phone] (Text Page)  After hours, weekends and holidays: Primary service should call [phone] for the on-call pain specialist    HISTORY OF PRESENT ILLNESS: 40 year old female Gastroesophageal reflux disease with esophagitis, hiatal hernia who is now POD #1 s/p LAPAROSCOPIC HIATAL HERNIA REPAIR, NISSEN FUNDOPLICATION, ESOPHOGASTRODUODENOSCOPY, PEG TUBE PLACEMENT by Dr Mack on 2/10/2020.   Patient currently reports upper abdominal pain, feels like I've been punched in the stomach, rating 7/10 at rest and 8 with activity OOB. Patient also reports migraine headache posterior neck pinching pressure and pushing into suboccipital region.  Rating headache at 6-7/10.  Has a history of headaches and takes Fioricet PRN states it is the only medication that works for her migraines, and tizanidine helps as muscle relaxant effect.  Denies nausea, chest pain, shortness of breath, voiding without difficulty, passing flatus, no BM yet, last BM evening of 2/9, Has been OOB, up to the bathroom 4-5 times since surgery and walked to the hallway x 1.  Has L) CT in place.       Patient has history of chronic back pain which she states is manageable with pain flares she accepts because her life has been changed since working with Dr Martinez at the pain clinic.  She is very pleased with her function,and QOL.  She is on Butrans patch, PRN tramadol and oxycodone (see above).       Discussed with patient multimodal pain management including continuing long acting home medication (Butrans) and inpatient pain medication doses, actions, potential side effects for acute postoperative pain and strategies for headache management.      REVIEW OF 10 BODY SYSTEMS: 10 point ROS of systems including Constitutional, Eyes, Respiratory, Cardiovascular, Gastroenterology, Genitourinary, Integumentary, Musculoskeletal, Psychiatric were all negative except for pertinent positives noted in my HPI.       INPATIENT MEDICATIONS PERTINENT TO PAIN CONSULT:   Medications related to Pain Management (From now, onward)    Start     Dose/Rate Route Frequency Ordered Stop    02/11/20 2000  DULoxetine (CYMBALTA) DR capsule 30 mg      30 mg Oral or Feeding Tube EVERY EVENING 02/11/20 1146      02/11/20 1230  acetaminophen (TYLENOL) solution 975 mg      975 mg Oral or G tube EVERY 6 HOURS 02/11/20 1141      02/11/20 1215  sennosides (SENOKOT) syrup 5 mL      5 mL Oral or G tube DAILY 02/11/20 1209      02/11/20 1200  DULoxetine (CYMBALTA) DR capsule 60 mg      60 mg Oral or Feeding Tube EVERY MORNING 02/11/20 1145      02/11/20 0930  tiZANidine (ZANAFLEX) tablet 4 mg      4 mg Oral or Feeding Tube 4 TIMES DAILY PRN 02/11/20 0930      02/11/20 0800  ketorolac (TORADOL) injection 15 mg      15 mg Intravenous EVERY 6 HOURS 02/11/20 0759 02/12/20 1959    02/10/20 1800  HYDROmorphone (DILAUDID) PCA 0.2 mg/mL OPIOID NAIVE CrCl > 50       Intravenous CONTINUOUS 02/10/20 1749              CURRENT MEDICATIONS:   Current Facility-Administered Medications Ordered in Epic   Medication Dose Route Frequency Provider Last Rate Last Dose     acetaminophen (TYLENOL) solution 975 mg  975 mg Oral or G tube Q6H Rajan De Jesus PA-C         artificial saliva (BIOTENE MT) solution 1 spray  1 spray Swish & Spit 4x Daily PRN Gume Campos MD   1 spray at 02/11/20 0530     dextrose 5% and 0.45% NaCl + KCl 20 mEq/L infusion   Intravenous  Continuous Dalton Milligan MD 75 mL/hr at 02/11/20 0807       DULoxetine (CYMBALTA) DR capsule 30 mg  30 mg Oral or Feeding Tube QPM Rajan De Jesus PA-C         DULoxetine (CYMBALTA) DR capsule 60 mg  60 mg Oral or Feeding Tube QAM Rajan De Jesus PA-C         enoxaparin ANTICOAGULANT (LOVENOX) injection 40 mg  40 mg Subcutaneous Q24H Dalton Milligan MD   40 mg at 02/11/20 0844     HYDROmorphone (DILAUDID) PCA 0.2 mg/mL OPIOID NAIVE CrCl > 50   Intravenous Continuous Alana Mack MD         ketorolac (TORADOL) injection 15 mg  15 mg Intravenous Q6H Darrell Randhawa PA-C   15 mg at 02/11/20 0844     naloxone (NARCAN) injection 0.1-0.4 mg  0.1-0.4 mg Intravenous Q2 Min PRN Norris Jones MD         naloxone (NARCAN) injection 0.1-0.4 mg  0.1-0.4 mg Intravenous Q2 Min PRN Dalton Milligan MD         ondansetron (ZOFRAN) injection 4 mg  4 mg Intravenous Q6H PRN Dalton Milligan MD         [START ON 2/12/2020] propranolol (INDERAL) tablet 60 mg  60 mg Oral or Feeding Tube BID Rajan De Jesus PA-C         sennosides (SENOKOT) syrup 5 mL  5 mL Oral or G tube Daily Rajan De Jesus PA-C         tiZANidine (ZANAFLEX) tablet 4 mg  4 mg Oral or Feeding Tube 4x Daily PRN Rajan De Jesus PA-C         No current [facility]-ordered outpatient medications on file.           HOME/PREVIOUS MEDICATIONS:   Prior to Admission medications    Medication Sig Start Date End Date Taking? Authorizing Provider   ACETAMINOPHEN PO Take 1,000 mg by mouth every 6 hours as needed for pain Take with tramadol   Yes Unknown, Entered By History   amitriptyline (ELAVIL) 10 MG tablet Take 1 tablet (10 mg) by mouth At Bedtime  Patient taking differently: Take 10 mg by mouth nightly as needed for sleep  10/31/19  Yes Faiza Martinez MD   buprenorphine (BUTRANS) 10 MCG/HR WK patch Place 1 patch onto the skin once a week 1/25/20  Yes Faiza Martinez MD   butalbital-acetaminophen-caffeine (FIORICET/ESGIC)  -40 MG tablet TAKE ONE TABLET BY MOUTH EVERY 4 HOURS AS NEEDED. MAX 10 PER MONTH. 12/30/19  Yes Noemy Nguyen MD   DULoxetine (CYMBALTA) 30 MG capsule Take 60mg in the morning and 30mg at bedtime 6/28/19  Yes Faiza Martinez MD   fluticasone (FLONASE) 50 MCG/ACT nasal spray INSTILL 2 SPRAYS INTO BOTH NOSTRILS DAILY 1/28/20  Yes Zoe Barahona MD   ivabradine (CORLANOR) 7.5 MG tablet Take 1 tablet (7.5 mg) by mouth 2 times daily (with meals) 11/14/19  Yes Kirk Russell MD   levothyroxine (SYNTHROID/LEVOTHROID) 25 MCG tablet Take 1 tablet (25 mcg) by mouth daily 9/10/19  Yes Shilpa Gailcia MD   medical cannabis (Patient's own supply.  Not a prescription) Take 1 Dose by mouth See Admin Instructions (This is NOT a prescription, and does not certify that the patient has a qualifying medical condition for medical cannabis.  The purpose of this order is  to document that the patient reports taking medical cannabis.)     Capsule formulation - uses as needed (currently out of this and not using as of January 28, 2020)   Yes Unknown, Entered By History   metoclopramide (REGLAN) 10 MG tablet Take 1 tablet (10 mg) by mouth 4 times daily (before meals and nightly) 12/30/19  Yes Noemy Nguyen MD   Multiple Vitamins-Minerals (MULTIVITAMIN PO) Take 1 tablet by mouth daily    Yes Reported, Patient   naproxen sodium (ANAPROX) 220 MG tablet Take 440-660 mg by mouth daily as needed for moderate pain   Yes Unknown, Entered By History   omeprazole (PRILOSEC) 40 MG DR capsule Take 1 capsule (40 mg) by mouth 2 times daily as needed (prn)  Patient taking differently: Take 40 mg by mouth 2 times daily  1/16/20  Yes Juana Griffith MD   oxyCODONE (ROXICODONE) 5 MG tablet Take 0.5 tablets (2.5 mg) by mouth every 6 hours as needed for severe pain 1/23/20  Yes Faiza Martinez MD   propranolol ER (INDERAL LA) 120 MG 24 hr capsule Take 1 capsule (120 mg) by mouth daily 8/23/19  Yes Noemy Nguyen  Jolene Stone MD   tiZANidine (ZANAFLEX) 4 MG tablet Take 1 tablet (4 mg) by mouth 4 times daily as needed for muscle spasms 6/28/19  Yes Faiza Martinez MD   traMADol (ULTRAM) 50 MG tablet Take 1 tablet (50 mg) by mouth every 6 hours as needed for severe pain 1/23/20  Yes Faiza Martinez MD   vitamin B complex with vitamin C (VITAMIN  B COMPLEX) TABS tablet Take 1 tablet by mouth daily   Yes Reported, Patient   VITAMIN D, CHOLECALCIFEROL, PO Take 2,000 Units by mouth daily   Yes Unknown, Entered By History   albuterol (PROAIR HFA/PROVENTIL HFA/VENTOLIN HFA) 108 (90 BASE) MCG/ACT Inhaler Inhale 2 puffs into the lungs every 6 hours as needed  1/22/16   Reported, Patient   cetirizine (ZYRTEC) 10 MG tablet Take 10 mg by mouth daily as needed  1/1/07   Reported, Patient   enoxaparin ANTICOAGULANT (LOVENOX) 40 MG/0.4ML syringe Inject 0.4 mLs (40 mg) Subcutaneous daily for 7 days 1/31/20 2/7/20  Cirilo Soto PA-C   EPINEPHrine (EPIPEN/ADRENACLICK/OR ANY BX GENERIC EQUIV) 0.3 MG/0.3ML injection 2-pack INJECT 0.3 MLS (0.3 MG) INTO THE MUSCLE AS NEEDED FOR ANAPHYLAXIS 9/25/19   Reported, Patient   famotidine (PEPCID) 20 MG tablet Take 1 tablet (20 mg) by mouth At Bedtime 9/25/19   Ellyn Rivera MD   ferrous fumarate 65 mg, [place]. FE,-Vitamin C 125 mg (VITRON-C)  MG TABS tablet Take 1 tablet by mouth every other day 2/7/20   Ellyn Rivera MD   naloxone (NARCAN) 4 MG/0.1ML nasal spray Spray 1 spray (4 mg) into one nostril alternating nostrils once as needed for opioid reversal every 2-3 minutes until assistance arrives 11/29/19   Faiza Martinez MD   ondansetron (ZOFRAN-ODT) 4 MG ODT tab Take 1-2 tablets (4-8 mg) by mouth every 12 hours as needed for nausea 9/15/17   Ellyn Rivera MD   Probiotic Product (PROBIOTIC DAILY PO) Take 2 packets by mouth every morning     Reported, Patient   rizatriptan (MAXALT-MLT) 5 MG ODT TAKE 1-2 TABLETS (5-10 MG) BY MOUTH AT ONSET OF HEADACHE FOR MIGRAINE (MAX 30  MG IN 24 HOURS) 10/23/19   Noemy Nguyen MD   SUMAtriptan (IMITREX STATDOSE) 6 MG/0.5ML pen injector kit Inject 0.5 ml (6 mg) subcutaneously at onset of migraine, May repeat in 1 hour , Max 12 mg/24 hrs 10/23/19   Noemy Nguyen MD           ALLERGIES:    Allergies   Allergen Reactions     Bee Venom Anaphylaxis, Difficulty breathing, Palpitations, Shortness Of Breath and Visual Disturbance     Patient does carry Epi-Pen     No Clinical Screening - See Comments Anaphylaxis     Chicken-Derived Products (Egg) Diarrhea and Nausea     Patient will self monitor  Other reaction(s): Nausea  Patient will self monitor  Other reaction(s): GI intolerance     Compazine [Prochlorperazine] Other (See Comments)     Extreme jittery     Food      Milk     Gabapentin      Doesn't tolerate - unsure of reaction     Gluten Meal Other (See Comments)     Pregabalin Other (See Comments)     Doesn't tolerate - unsure of reaction     Seasonal Allergies Unknown     Dust, mold     Topiramate      Wheat Diarrhea     Wheat Bran Nausea     Patient will self monitor  Other reaction(s): Nausea  Patient will self monitor            PAST MEDICAL AND PSYCHIATRIC HISTORY:    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     Cervicalgia      Chronic sinusitis 00/2007    Diagnosed with chronic pansinusitis 2016     CSF leak     Chiari malformation     Depression      Tom-Danlos disease      Eosinophilic esophagitis 08/2018     Gastroesophageal reflux disease 3/1/2017    I have large hiatal hernia     Hiatal hernia 2016    Have adeline hiatel hernia     Hoarseness Unsure    It comes and goes     Hypothyroid      IBS (irritable bowel  syndrome) with constipation     improved on Linzess     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     Migraines 1/1/2007    Unsure of exact date     Nasal polyps 2013    Were revoved 03/2017, benign     Orthostatic hypotension      Other nervous system complications 1/2014    Autonomic nervous system disorder, neuralgia, neuropathy     Swelling, mass, or lump in head and neck 08/2016    Lymph node has been growing for last year     Thyroid disease 01/01/2008     Urinary incontinence      Urinary urgency            PAST SURGICAL HISTORY:   Past Surgical History:   Procedure Laterality Date     AS REPAIR OF NASAL SEPTUM  2009    repair broke nose     BACK SURGERY  12/09/2013  10/01/2014    Spinal fusion L4-S1, L5 moving 5/8ths in. Remove screws/rods     BIOPSY  01/01/2008 & 3/2017    mole biopsy, lymph node biopsy     COLONOSCOPY  3/2017    Colonoscopy and GI     ESOPHAGEAL IMPEDENCE FUNCTION TEST WITH 24 HOUR PH GREATER THAN 1 HOUR N/A 9/17/2019    Procedure: IMPEDANCE PH STUDY, ESOPHAGUS, 24 HOUR;  Surgeon: Raghavendra Grande MD;  Location:  GI     ESOPHAGOSCOPY, GASTROSCOPY, DUODENOSCOPY (EGD), COMBINED N/A 8/3/2018    Procedure: COMBINED ESOPHAGOSCOPY, GASTROSCOPY, DUODENOSCOPY (EGD), BIOPSY SINGLE OR MULTIPLE;;  Surgeon: Juan Woodson MD;  Location:  GI     ESOPHAGOSCOPY, GASTROSCOPY, DUODENOSCOPY (EGD), COMBINED N/A 10/22/2018    Procedure: COMBINED ESOPHAGOSCOPY, GASTROSCOPY, DUODENOSCOPY (EGD), BIOPSY SINGLE OR MULTIPLE;  Surgeon: Sadia Carolina MD;  Location:  GI     ESOPHAGOSCOPY, GASTROSCOPY, DUODENOSCOPY (EGD), COMBINED N/A 12/17/2018    Procedure: COMBINED ESOPHAGOSCOPY, GASTROSCOPY, DUODENOSCOPY (EGD);  Surgeon: Juan Woodson MD;  Location: [facility]     LAPAROSCOPIC HERNIORRHAPHY HIATAL N/A 2/10/2020    Procedure: LAPAROSCOPIC HIATAL HERNIA REPAIR, NISSEN FUNDOPLICATION, ESOPHOGASTRODUODENOSCOPY, PEG TUBE PLACEMENT.;  Surgeon: Alana Mack MD;  Location:  OR      NASAL/SINUS POLYPECTOMY  2017    Polyps were benign     NOSE SURGERY  2007    deviated septum     TONSILLECTOMY & ADENOIDECTOMY  1981     TUBAL LIGATION  07/01/2011         FAMILY HISTORY: family history includes Asthma in her sister and son; Cancer in her father; Connective Tissue Disorder in her mother and sister; Diabetes in her maternal grandmother, paternal grandfather, and paternal grandmother; Heart Disease in her maternal grandmother; Hypertension in her maternal grandmother; Migraines in her father, sister, sister, and sister; Thyroid Disease in her sister.      HEALTH & LIFESTYLE PRACTICES:   Tobacco:  reports that she quit smoking about 6 years ago. Her smoking use included cigarettes. She started smoking about 29 years ago. She has a 2.00 pack-year smoking history. She has never used smokeless tobacco.  Alcohol:  reports current alcohol use.  Illicit drugs:  reports previous drug use. Drug: Marijuana.      SOCIAL HISTORY: , lives in [place] with her , 3 children, has 4 daughters, 1 son, and 2 dogs, stays active doing housecleaning and enjoys going out shopping with her daughters.        LABORATORY VALUES:   Last Basic Metabolic Panel:  Lab Results   Component Value Date     02/11/2020      Lab Results   Component Value Date    POTASSIUM 3.9 02/11/2020     Lab Results   Component Value Date    CHLORIDE 106 02/11/2020     Lab Results   Component Value Date    JUAN 8.0 02/11/2020     Lab Results   Component Value Date    CO2 25 02/11/2020     Lab Results   Component Value Date    BUN 7 02/11/2020     Lab Results   Component Value Date    CR 0.56 02/11/2020     Lab Results   Component Value Date     02/11/2020       CBC results:  Lab Results   Component Value Date    WBC 15.4 02/11/2020     Lab Results   Component Value Date    HGB 10.1 02/11/2020     Lab Results   Component Value Date    HCT 33.0 02/11/2020     Lab Results   Component Value Date     02/11/2020        DIAGNOSTIC TESTS:       Labs above reviewed as well as additional relevant diagnostic studies from the EPIC record.       PHYSICAL EXAMINATION:  VITAL SIGNS:  B/P: 136/89, T: 98.6, P: 86, R: 12    EXAM:  Constitutional: alert, no distress and cooperative   Cardiovascular: RRR, brisk capillary reflex  Respiratory: unlabored, no cough, no wheeze, left CT in place  Psychiatric: mentation appears normal and judgment and insight intact  Head: Normocephalic. Suboccipital bilateral tenderness   Abdomen: Abdomen soft, tender. BS present. PEG tube in place  SKIN: no suspicious lesions or rashes, surgical port sites on abdomen c/d/i, CT dressing intact      Total face to face time spent was 45 minutes, and more than 50% of face to face time was spent in counseling and/or coordination of care regarding principles of multidisciplinary care which included discussions on self care, rehabilitation, psychological aspects of pain, medication management and interventions.    So Mckay, FIDELINA CNP  February 11, 2020, 12:53 PM  Inpatient Pain Management Service

## 2020-02-11 NOTE — PLAN OF CARE
"Shift: 2100 - 0700  VS: BP (!) 141/94 (BP Location: Right arm)   Pulse 73   Temp 95.8  F (35.4  C) (Axillary)   Resp 12   Ht 1.702 m (5' 7\")   SpO2 95%   BMI 30.78 kg/m      Neuro: A&Ox4, lethargic, drifts off to sleep between cares, CMS intact, maxalt given for migraine  Cardiac: pulses palpable  Resp: lung sounds clear on R, diminished L, no SOB reported, sating well on 2L NC - attempted to wean, capno intact, IS encouraged  GI: not passing gas, bowel sounds hypoactive, LBM 2/9  : voiding spontaneously, adequate amount of bree urine   Skin: lap sites x5 CDI, CT site without crepitus  Pain/Nausea: incisional pain treated with PCA dilaudid 0.3 and butrans patch; denies nausea  LDA: L PIV infusing IVMF, CT to water seal, PEG tube to gravity  Diet: NPO with ice  Mobility: 1 assist, up to bedside commode this shift   Labs: reviewed, chest XR  Plan: ambulate in AM, continue plan of care     "

## 2020-02-11 NOTE — PROGRESS NOTES
"THORACIC & FOREGUT SURGERY    S:  No overnight events.  Pt seen at bedside resting comfortably. Concern for restarted her home medications, discussed with pharmacy. No other complaints.        O:  /89 (BP Location: Left arm)   Pulse 86   Temp 98.6  F (37  C) (Axillary)   Resp 12   Ht 1.702 m (5' 7\")   SpO2 97%   BMI 30.78 kg/m      A&Ox3, NAD  Breathing non-labored  RRR  Soft, NDNT  Distal extremities appear well perfused        A/P: Ada Welch is a 49 year old female who is s/p Laparoscopic Hiatal Hernia Repair and Nissen fundoplication with EGD and PEG tube placement. Progressing appropriately.      - Consult to pain service, appreciate recs  - CLD with G-tube open  - Pain control: dPCA, APAP, oxy  - All pills to be crushed  - G-tube clamped for 30 min after administration of enteric meds  - Propranolol changed to immediate release  - Restart home duloxetine, tizanidine  - PT/OT  - Encourage OOB, ambulation  - Lovenox ppx  - Dispo 7B    Discussed with staff    Rajan De Jesus PA-C  Thoracic Surgery    "

## 2020-02-11 NOTE — PLAN OF CARE
Patient arrived to floor from PACU. PCA increased to 0.3mg. PEG tube to gravity and chest tube to water seal. Voided 800cc, x 1 assist. Alert and oriented x 4.

## 2020-02-11 NOTE — PLAN OF CARE
7B PT Eval Complete, Treatment Initiated    PT -   Discharge Planner PT   Patient plan for discharge: home with family assist  Current status: patient ambulated x15 with FWW and CGA before complaining of fatigue and tinnitus. Upon return to bed patient with stable vitals (BP:128/79, HR:76, SPO2:98%).   Barriers to return to prior living situation: medical status, post surgical pain  Recommendations for discharge: home with family assist as needed and potentially home PT  Rationale for recommendations: Anticipate patient will be appropriate to return home from PT perspective when medically stable. Patient may benefit from home PT to further progress mobility and maximize IND.   Entered by: Sam Hair 02/11/2020 11:12 AM

## 2020-02-11 NOTE — PLAN OF CARE
Vitals:    02/11/20 0437 02/11/20 0500 02/11/20 0807 02/11/20 1528   BP: 134/76  136/89 112/66   BP Location: Right arm  Left arm Right arm   Pulse: 85  86 77   Resp: 12 12 14   Temp: 95.7  F (35.4  C)  98.6  F (37  C) 98.1  F (36.7  C)   TempSrc: Axillary  Axillary Axillary   SpO2: 97% 93% 97% 98%   Height:        Afebrile vital stable sating 97% on 2 lit, lungs clear, diminished on left upper lobes, hypo BS, voiding adequate, lap site x 5 intact, complain of pain and headache, PCA dilaudid increased to 0.4 Q 10, new PCA cartridge changed.  Up and ambulated, tolerating clears, G-tube to gravity, chest x-ray is done,  Chest tube removed, continue with plan of care.

## 2020-02-11 NOTE — PLAN OF CARE
Discharge Planner OT   Patient plan for discharge: Home w/A  Current status: Per chart review and interdisciplinary collaboration, pt is close to functional baseline. Pt has support at home for ADL/IADL PRN. PT to follow while hospitalized, OT will sign off as pt does not have acute needs.   Barriers to return to prior living situation: DEFER to PT  Recommendations for discharge: DEFER to PT  Rationale for recommendations: DEFER to PT       Entered by: Cari Nesbitt 02/11/2020 3:50 PM     OT 7B DEFER

## 2020-02-11 NOTE — PROGRESS NOTES
Post-Operative Check  2/10/2020     Subjective:  No acute concerns. Resting at time of exam. Denies shortness of breath. Reports some abdominal pain. No chest pain or tightness.     Objective:  Temp:  [95.6  F (35.3  C)-97.8  F (36.6  C)] 95.6  F (35.3  C)  Pulse:  [60-77] 73  Heart Rate:  [66-78] 70  Resp:  [10-18] 12  BP: (114-167)/() 148/98  SpO2:  [94 %-98 %] 94 %  I/O last 3 completed shifts:  In: 1000 [I.V.:1000]  Out: 30 [Blood:30]    Gen: Awake, alert, NAD  Resp: NLB on 2L, CT x1 to waterseal   Abd: Soft, ND, NT   Ext: WWP  Dressing/Incision: C/d/i w/o erythema/drainage    BMPNo lab results found in last 7 days.  CBCNo lab results found in last 7 days.  INRNo lab results found in last 7 days.   AST/ALT & Alk PhosNo lab results found in last 7 days.  Bili  Recent Labs   Lab Test 12/17/18  0920 07/06/18  2229 03/02/18  1340 07/24/17  1518   BILITOTAL 0.6 0.4 1.2 0.6     Lipase/AmlyaseNo lab results found in last 7 days.        A/P: Ada Welch is a 49 year old female who is s/p Laparoscopic Hiatal Hernia Repair and Nissen fundoplication with EGD and PEG tube placement. Post-operatively she is recovering well with minimal concerns. Reports some pain but denies shortness of breath.     - Continue post operative cares  - Pain control with PCA dilaudid  - NPO  - Lovenox 40mg  - Propranolol ER daily      Disposition: per primary team recommendations    Gume Campos MD  General Surgery

## 2020-02-11 NOTE — PROGRESS NOTES
7B PT Eval   02/11/20 1000   Quick Adds   Type of Visit Initial PT Evaluation   Living Environment   Lives With spouse;child(wendi), adult  (2 children at home, 17 yo daughter provides assist in PM)   Living Arrangements house   Home Accessibility stairs to enter home;stairs within home   Number of Stairs, Main Entrance 3   Stair Railings, Main Entrance railings safe and in good condition   Number of Stairs, Within Home, Primary other (see comments)  (flight)   Stair Railings, Within Home, Primary railings safe and in good condition   Transportation Anticipated family or friend will provide   Living Environment Comment Patient lives at home with spouse and 2 children. Spouse is gone for work monday-thursday but able to provide assist over weekend. 17 yo daughter provides assist as needed after returning home from work   Self-Care   Usual Activity Tolerance fair   Current Activity Tolerance poor   Equipment Currently Used at Home cane, straight;walker, rolling;wheelchair, manual   Activity/Exercise/Self-Care Comment patient exercises on stairs at home as able   Functional Level Prior   Ambulation 1-->assistive equipment   Transferring 0-->independent   Toileting 0-->independent   Bathing 0-->independent   Communication 0-->understands/communicates without difficulty   Swallowing 0-->swallows foods/liquids without difficulty   Cognition 0 - no cognition issues reported   Fall history within last six months no   Which of the above functional risks had a recent onset or change? ambulation;transferring   Prior Functional Level Comment patient utilizes FWW vs SPC depending on the day, at home patient ambulates or bumps up the stairs depending on fatigue level. Patient with good awareness of deficits and mobility limitations   General Information   Onset of Illness/Injury or Date of Surgery - Date 02/10/20   Referring Physician Dalton Milligan MD   Patient/Family Goals Statement patient expresses goal at home is to  "ambulate 3000 steps per day   Pertinent History of Current Problem (include personal factors and/or comorbidities that impact the POC) Per H&P \"Ada Welch is a 49 year old female with a complex medical history including Tom-Danlos syndrome, chiari malformation, chronic pain, and cervicalgia who presents for a follow-up. \"   Precautions/Limitations abdominal precautions   General Info Comments activity: up in chair 3x daily   Cognitive Status Examination   Level of Consciousness lethargic/somnolent   Follows Commands and Answers Questions 100% of the time   Personal Safety and Judgment intact   Memory intact   Range of Motion (ROM)   ROM Comment UE/LE ROM grossly WFL   Strength   Strength Comments UE/LE strength grossly WFL   Bed Mobility   Bed Mobility Comments mod I supine<>sit   Transfer Skills   Transfer Comments IND sit<>stand   Gait   Gait Comments poor ambulatory endurance, slow gait speed, excellent awareness activity limitations, FWW for stability while ambulating, CGA throughout for safety   Balance   Balance Comments IND in static stance, IND in dynamic reaching, patient mod I with UE support on FWW for ambulation   General Therapy Interventions   Planned Therapy Interventions ADL retraining;balance training;bed mobility training;gait training;prosthetic fitting/training;ROM;strengthening;stretching;transfer training   Clinical Impression   Criteria for Skilled Therapeutic Intervention yes, treatment indicated   PT Diagnosis impaired functional mobility   Influenced by the following impairments impaired activity tolerance, surgical pain, slightly impaired balance   Functional limitations due to impairments transfers, gait, stairs   Clinical Presentation Stable/Uncomplicated   Clinical Presentation Rationale clinical judgement   Clinical Decision Making (Complexity) Low complexity   Therapy Frequency 5x/week   Predicted Duration of Therapy Intervention (days/wks) 2 weeks   Anticipated Discharge " "Disposition Home with Assist;Home with Home Therapy   Risk & Benefits of therapy have been explained Yes   Patient, Family & other staff in agreement with plan of care Yes   [facility] TM \"6 Clicks\"   2016, Trustees of [facility], under license to [employer].  All rights reserved.   6 Clicks Short Forms Basic Mobility Inpatient Short Form   [facility] AM-PAC  \"6 Clicks\" V.2 Basic Mobility Inpatient Short Form   1. Turning from your back to your side while in a flat bed without using bedrails? 4 - None   2. Moving from lying on your back to sitting on the side of a flat bed without using bedrails? 4 - None   3. Moving to and from a bed to a chair (including a wheelchair)? 4 - None   4. Standing up from a chair using your arms (e.g., wheelchair, or bedside chair)? 4 - None   5. To walk in hospital room? 3 - A Little   6. Climbing 3-5 steps with a railing? 3 - A Little   Basic Mobility Raw Score (Score out of 24.Lower scores equate to lower levels of function) 22   Total Evaluation Time   Total Evaluation Time (Minutes) 15     "

## 2020-02-12 ENCOUNTER — APPOINTMENT (OUTPATIENT)
Dept: PHYSICAL THERAPY | Facility: CLINIC | Age: 50
DRG: 326 | End: 2020-02-12
Attending: STUDENT IN AN ORGANIZED HEALTH CARE EDUCATION/TRAINING PROGRAM
Payer: COMMERCIAL

## 2020-02-12 LAB
ANION GAP SERPL CALCULATED.3IONS-SCNC: 6 MMOL/L (ref 3–14)
BUN SERPL-MCNC: 6 MG/DL (ref 7–30)
CALCIUM SERPL-MCNC: 8 MG/DL (ref 8.5–10.1)
CHLORIDE SERPL-SCNC: 106 MMOL/L (ref 94–109)
CO2 SERPL-SCNC: 27 MMOL/L (ref 20–32)
CREAT SERPL-MCNC: 0.62 MG/DL (ref 0.52–1.04)
GFR SERPL CREATININE-BSD FRML MDRD: >90 ML/MIN/{1.73_M2}
GLUCOSE SERPL-MCNC: 106 MG/DL (ref 70–99)
POTASSIUM SERPL-SCNC: 3.6 MMOL/L (ref 3.4–5.3)
SODIUM SERPL-SCNC: 139 MMOL/L (ref 133–144)

## 2020-02-12 PROCEDURE — 25000132 ZZH RX MED GY IP 250 OP 250 PS 637: Performed by: STUDENT IN AN ORGANIZED HEALTH CARE EDUCATION/TRAINING PROGRAM

## 2020-02-12 PROCEDURE — 25000128 H RX IP 250 OP 636: Performed by: PHYSICIAN ASSISTANT

## 2020-02-12 PROCEDURE — 25800030 ZZH RX IP 258 OP 636: Performed by: STUDENT IN AN ORGANIZED HEALTH CARE EDUCATION/TRAINING PROGRAM

## 2020-02-12 PROCEDURE — 25000132 ZZH RX MED GY IP 250 OP 250 PS 637: Performed by: PHYSICIAN ASSISTANT

## 2020-02-12 PROCEDURE — 97116 GAIT TRAINING THERAPY: CPT | Mod: GP

## 2020-02-12 PROCEDURE — 80048 BASIC METABOLIC PNL TOTAL CA: CPT | Performed by: PHYSICIAN ASSISTANT

## 2020-02-12 PROCEDURE — 12000001 ZZH R&B MED SURG/OB UMMC

## 2020-02-12 PROCEDURE — 25000128 H RX IP 250 OP 636: Performed by: STUDENT IN AN ORGANIZED HEALTH CARE EDUCATION/TRAINING PROGRAM

## 2020-02-12 PROCEDURE — 99207 ZZC CDG-MDM COMPONENT: MEETS MODERATE - UP CODED: CPT | Performed by: NURSE PRACTITIONER

## 2020-02-12 PROCEDURE — 97530 THERAPEUTIC ACTIVITIES: CPT | Mod: GP

## 2020-02-12 PROCEDURE — 36415 COLL VENOUS BLD VENIPUNCTURE: CPT | Performed by: PHYSICIAN ASSISTANT

## 2020-02-12 PROCEDURE — 99232 SBSQ HOSP IP/OBS MODERATE 35: CPT | Performed by: NURSE PRACTITIONER

## 2020-02-12 RX ORDER — BISACODYL 10 MG
10 SUPPOSITORY, RECTAL RECTAL DAILY PRN
Status: DISCONTINUED | OUTPATIENT
Start: 2020-02-12 | End: 2020-02-13

## 2020-02-12 RX ORDER — VITAMIN B COMPLEX
2000 TABLET ORAL DAILY
Status: DISCONTINUED | OUTPATIENT
Start: 2020-02-12 | End: 2020-02-13 | Stop reason: HOSPADM

## 2020-02-12 RX ORDER — IVABRADINE 7.5 MG/1
7.5 TABLET, FILM COATED ORAL 2 TIMES DAILY WITH MEALS
Status: DISCONTINUED | OUTPATIENT
Start: 2020-02-12 | End: 2020-02-12

## 2020-02-12 RX ORDER — HYDROMORPHONE HCL/0.9% NACL/PF 0.2MG/0.2
.2-.4 SYRINGE (ML) INTRAVENOUS
Status: DISCONTINUED | OUTPATIENT
Start: 2020-02-12 | End: 2020-02-13

## 2020-02-12 RX ORDER — OXYCODONE HCL 5 MG/5 ML
5-10 SOLUTION, ORAL ORAL
Status: DISCONTINUED | OUTPATIENT
Start: 2020-02-12 | End: 2020-02-13 | Stop reason: HOSPADM

## 2020-02-12 RX ORDER — POLYETHYLENE GLYCOL 3350 17 G/17G
17 POWDER, FOR SOLUTION ORAL DAILY
Status: DISCONTINUED | OUTPATIENT
Start: 2020-02-12 | End: 2020-02-13

## 2020-02-12 RX ADMIN — OXYCODONE HYDROCHLORIDE 10 MG: 5 SOLUTION ORAL at 22:56

## 2020-02-12 RX ADMIN — OXYCODONE HYDROCHLORIDE 5 MG: 5 SOLUTION ORAL at 11:22

## 2020-02-12 RX ADMIN — PROPRANOLOL HYDROCHLORIDE 60 MG: 40 TABLET ORAL at 19:56

## 2020-02-12 RX ADMIN — KETOROLAC TROMETHAMINE 15 MG: 15 INJECTION, SOLUTION INTRAMUSCULAR; INTRAVENOUS at 09:46

## 2020-02-12 RX ADMIN — POLYETHYLENE GLYCOL 3350 17 G: 17 POWDER, FOR SOLUTION ORAL at 11:22

## 2020-02-12 RX ADMIN — POTASSIUM CHLORIDE, DEXTROSE MONOHYDRATE AND SODIUM CHLORIDE: 150; 5; 450 INJECTION, SOLUTION INTRAVENOUS at 03:31

## 2020-02-12 RX ADMIN — DULOXETINE HYDROCHLORIDE 30 MG: 30 CAPSULE, DELAYED RELEASE ORAL at 21:37

## 2020-02-12 RX ADMIN — ACETAMINOPHEN 975 MG: 325 SOLUTION ORAL at 06:28

## 2020-02-12 RX ADMIN — LIDOCAINE 3 PATCH: 560 PATCH PERCUTANEOUS; TOPICAL; TRANSDERMAL at 19:53

## 2020-02-12 RX ADMIN — DULOXETINE HYDROCHLORIDE 60 MG: 60 CAPSULE, DELAYED RELEASE ORAL at 08:31

## 2020-02-12 RX ADMIN — ACETAMINOPHEN 975 MG: 325 SOLUTION ORAL at 13:30

## 2020-02-12 RX ADMIN — PROPRANOLOL HYDROCHLORIDE 60 MG: 40 TABLET ORAL at 08:30

## 2020-02-12 RX ADMIN — SENNOSIDES 5 ML: 8.8 SYRUP ORAL at 08:30

## 2020-02-12 RX ADMIN — TIZANIDINE 4 MG: 4 TABLET ORAL at 06:29

## 2020-02-12 RX ADMIN — DOCUSATE SODIUM 100 MG: 50 LIQUID ORAL at 11:22

## 2020-02-12 RX ADMIN — KETOROLAC TROMETHAMINE 15 MG: 15 INJECTION, SOLUTION INTRAMUSCULAR; INTRAVENOUS at 03:31

## 2020-02-12 RX ADMIN — MELATONIN 2000 UNITS: at 08:31

## 2020-02-12 RX ADMIN — ACETAMINOPHEN 975 MG: 325 SOLUTION ORAL at 21:38

## 2020-02-12 RX ADMIN — OXYCODONE HYDROCHLORIDE 5 MG: 5 SOLUTION ORAL at 12:17

## 2020-02-12 RX ADMIN — B-COMPLEX W/ C & FOLIC ACID TAB 1 TABLET: TAB at 08:31

## 2020-02-12 RX ADMIN — Medication 0.4 MG: at 19:48

## 2020-02-12 RX ADMIN — Medication 0.4 MG: at 21:47

## 2020-02-12 RX ADMIN — OXYCODONE HYDROCHLORIDE 10 MG: 5 SOLUTION ORAL at 19:11

## 2020-02-12 RX ADMIN — TIZANIDINE 4 MG: 4 TABLET ORAL at 13:30

## 2020-02-12 RX ADMIN — DOCUSATE SODIUM 100 MG: 50 LIQUID ORAL at 19:52

## 2020-02-12 RX ADMIN — TIZANIDINE 4 MG: 4 TABLET ORAL at 19:53

## 2020-02-12 RX ADMIN — OXYCODONE HYDROCHLORIDE 10 MG: 5 SOLUTION ORAL at 15:29

## 2020-02-12 RX ADMIN — ENOXAPARIN SODIUM 40 MG: 40 INJECTION SUBCUTANEOUS at 08:30

## 2020-02-12 ASSESSMENT — ACTIVITIES OF DAILY LIVING (ADL)
ADLS_ACUITY_SCORE: 15
ADLS_ACUITY_SCORE: 14
ADLS_ACUITY_SCORE: 15
ADLS_ACUITY_SCORE: 15
ADLS_ACUITY_SCORE: 14
ADLS_ACUITY_SCORE: 14

## 2020-02-12 ASSESSMENT — MIFFLIN-ST. JEOR: SCORE: 1571.64

## 2020-02-12 NOTE — PROVIDER NOTIFICATION
At 2145, RN discussed with provider in person about pt low UO as of 2130. Only 75ml in 8hr period. Bladder scan was 73ml. RN encouraged fluids and will update provider if low UO persists at 0200.    At 0145 pt had large unmeasured void. Will continue to monitor.

## 2020-02-12 NOTE — PLAN OF CARE
"Shift: 1900 - 0700    VS: /69   Pulse 74   Temp 97.9  F (36.6  C) (Oral)   Resp 14   Ht 1.702 m (5' 7\")   SpO2 95%   BMI 30.78 kg/m        Neuro: A&Ox4, CMS intact, lethargic, pt reported lightheadedness with ambulation per PT  Cardiac: soft BPs but within parameters, known dysautonomia  Resp: lung sounds clear, no SOB reported, sating well on room air, IS encouraged  GI:  passing gas, bowel sounds hypoactive, LBM 2/9/20  : voiding spontaneously, adequate amount of urine   Skin: lap sites x5 CDI, former L chest tube site dressing CDI  Pain/Nausea: incisional abdominal pain treated with PCA dilaudid 0.4, butrans patch, scheduled tylenol and toradol; headache treated with fioricet and tizanidine, denies nausea  LDA: L PIV infusing IV MF, PEG tube to gravity-clamped 30min after med administration  Diet: clear liquid, pt up in chair for meals, meds crushed per provider note  Mobility: Ax1 with gait belt, activity encouraged  Labs: reviewed, WBC 15.4, chest XR  Plan: ambulate in bass, continue plan of care     "

## 2020-02-12 NOTE — PROGRESS NOTES
"THORACIC & FOREGUT SURGERY    S:  No overnight events.  Seen by pain and meds adjusted. Complains of HA this am, reports PTA triptans shot and pils help manage her HA. Otherwise no issue. Tolerated CLD yesterday with G tube to gravity. Denies N/V. CT removed yesterday without issue and stable follow-up Xray       O:  /78 (BP Location: Right arm)   Pulse 99   Temp 96.6  F (35.9  C) (Axillary)   Resp 14   Ht 1.702 m (5' 7\")   SpO2 96%   BMI 30.78 kg/m      A&Ox3, NAD  Breathing non-labored  RRR  Soft, NDNT  Distal extremities appear well perfused        A/P: Ada Welch is a 49 year old female who is s/p Laparoscopic Hiatal Hernia Repair and Nissen fundoplication with EGD and PEG tube placement POD#2. Progressing appropriately.      - HA to be addressed by pain team- appreciate recs  - CLD with G-tube clamped- if nauseous, ok to temporarily connect back to gravity/open  - Pain control: will discontinue  PCA and transition to PO opioids  - All pills to be crushed  - Propranolol changed to immediate release, PTA ivabradine only if tachycardic and BP over 110/60  - PT/OT  - Encourage OOB, ambulation  - Lovenox ppx  - Dispo 7B    Discussed with staff    Monica NICKERSON  General Surgery (PGY-1)  Pager [phone]    "

## 2020-02-12 NOTE — OP NOTE
Preoperative diagnosis: Hiatal hernia type  III  Postoperative diagnosis: Hiatal hernia typeIII  Procedure:   1) Esophagogastroscopy  2) Laparoscopic repair of hiatal hernia  3)  with Nissen Fundoplication  4) Gastrostomy tube placement (20 Fr)  5) Left tube thoracostomy (19 Fr Joe)   Anesthesia: General   Surgeon: Alana Mack MD (present and participated in the entire procedure)  Resident surgeon: Dalton Echols MD   EBL: 30 cc  Complications: none  Findings:  Endoscopy: no obvious strictures  Hernia:  >50% of stomach  G-tube:  YES (PEG )    Description of procedure  We securedMs. Welch in the supine position, with a foot-board and prepared and draped the area. We used a 5-port approach and placed the first port using an open technique with fascial sutures for eventual closure. The remainder of the ports were placed under laparoscopic guidance and the used a Leonel liver retractor.    We then took down the gastrohepatic ligament and short gastric vessels and dissected the hiatus circumferentially, preserving the peritoneal lining on the crura. Next, we dissected the esophagus high up into the mediastinum to the level of the inferior pulmonary veins and taking great care to preserve the vagus nerves. We mobilized the gastroesophageal fat pad and anterior vagus nerve off the GE junction to clearly identify it and closed the hiatus with a total of 2 stitches (0 silk with pledgets).  We verified with a 52 dilator in place that there was sufficient room to comfortably pass a laparoscopic grasper alongside the esophagus and that the approximation was sufficient.      Next, we performed an endoscopy to also verify that we had at least 2 cm of intraabdominal esophagus.         Following this, with a 52 Fr dilator in place, we pulled the fundus posteriorly around the esophagus and positioned the wrap between the previously mobilized anterior vagus nerve and the esophagus. We then secured the Nissen wrap with 3 stitches  "(2-0 silk) going from stomach to esophagus to stomach on each stitch and verified that there was sufficient room to comfortably pass a laparoscopic grasper alongside the esophagus.  Finally, we removed the dilator and performed an endoscopy to verify on retroflexion that we had a \"stack of coins\" configuration of the wrap.     G-tube: We then performed a gastropexy and placed a G-tube with endoscopic and laparoscopic guidance.  We closed the incisions with absorbable sutures.  Finally, we made a small incision in the left anterolateral chest wall and placed a small-bore chest tube and secured it.      "

## 2020-02-12 NOTE — CONSULTS
Ada was seen bedside for Gtube education. She was shown all tube cares, venting and medication administration. She was extremely sleepy during class but said she was fine. Please review all cares with her to ensure she can perform all cares at home.    Literature given: Handwashing and Skin Care, Caring for Your Tube at Home.

## 2020-02-12 NOTE — PLAN OF CARE
7a-7p:  VSS.   GI/: Voiding, no BM, not passing gas.   Diet: tolerating clears  Incisions/Drains: lap sites CDI; G tube clamped this AM, irrigated x2 with meds.   Skin: WDL  IV: PIV TKO  Activity: up SBA, ambulated to BR, PT worked with pt in afternoon  Labs: K 3.6  PRN meds: oxy Q3H and zanaflex Q6H  Changes this shift: PCA discontinued  Plan: encourage ambulation, enema if no BM

## 2020-02-13 ENCOUNTER — TELEPHONE (OUTPATIENT)
Dept: SURGERY | Facility: CLINIC | Age: 50
End: 2020-02-13

## 2020-02-13 VITALS
OXYGEN SATURATION: 98 % | WEIGHT: 200.4 LBS | DIASTOLIC BLOOD PRESSURE: 85 MMHG | SYSTOLIC BLOOD PRESSURE: 123 MMHG | HEIGHT: 67 IN | BODY MASS INDEX: 31.45 KG/M2 | RESPIRATION RATE: 16 BRPM | TEMPERATURE: 97.7 F | HEART RATE: 78 BPM

## 2020-02-13 DIAGNOSIS — K44.9 HIATAL HERNIA: Primary | ICD-10-CM

## 2020-02-13 LAB
COPATH REPORT: NORMAL
PLATELET # BLD AUTO: 186 10E9/L (ref 150–450)

## 2020-02-13 PROCEDURE — 36415 COLL VENOUS BLD VENIPUNCTURE: CPT | Performed by: STUDENT IN AN ORGANIZED HEALTH CARE EDUCATION/TRAINING PROGRAM

## 2020-02-13 PROCEDURE — 85049 AUTOMATED PLATELET COUNT: CPT | Performed by: STUDENT IN AN ORGANIZED HEALTH CARE EDUCATION/TRAINING PROGRAM

## 2020-02-13 PROCEDURE — 25000132 ZZH RX MED GY IP 250 OP 250 PS 637: Performed by: STUDENT IN AN ORGANIZED HEALTH CARE EDUCATION/TRAINING PROGRAM

## 2020-02-13 PROCEDURE — 25000128 H RX IP 250 OP 636: Performed by: STUDENT IN AN ORGANIZED HEALTH CARE EDUCATION/TRAINING PROGRAM

## 2020-02-13 PROCEDURE — 25000132 ZZH RX MED GY IP 250 OP 250 PS 637: Performed by: PHYSICIAN ASSISTANT

## 2020-02-13 RX ORDER — POLYETHYLENE GLYCOL 3350 17 G/17G
17 POWDER, FOR SOLUTION ORAL 2 TIMES DAILY
Qty: 35 PACKET | Refills: 1 | Status: SHIPPED | OUTPATIENT
Start: 2020-02-13 | End: 2020-03-31

## 2020-02-13 RX ORDER — ACETAMINOPHEN 160 MG/5ML
1000 LIQUID ORAL EVERY 8 HOURS PRN
Qty: 672 ML | Refills: 0 | Status: SHIPPED | OUTPATIENT
Start: 2020-02-13 | End: 2020-03-12

## 2020-02-13 RX ORDER — BISACODYL 10 MG
10 SUPPOSITORY, RECTAL RECTAL DAILY PRN
Status: DISCONTINUED | OUTPATIENT
Start: 2020-02-13 | End: 2020-02-13 | Stop reason: HOSPADM

## 2020-02-13 RX ORDER — HYDROMORPHONE HCL/0.9% NACL/PF 0.2MG/0.2
0.2 SYRINGE (ML) INTRAVENOUS EVERY 6 HOURS PRN
Status: DISCONTINUED | OUTPATIENT
Start: 2020-02-13 | End: 2020-02-13

## 2020-02-13 RX ORDER — POLYETHYLENE GLYCOL 3350 17 G/17G
17 POWDER, FOR SOLUTION ORAL 2 TIMES DAILY
Status: DISCONTINUED | OUTPATIENT
Start: 2020-02-13 | End: 2020-02-13 | Stop reason: HOSPADM

## 2020-02-13 RX ORDER — LIDOCAINE 4 G/G
1-3 PATCH TOPICAL EVERY 24 HOURS
Qty: 20 PATCH | Refills: 0 | Status: SHIPPED | OUTPATIENT
Start: 2020-02-13

## 2020-02-13 RX ORDER — BISACODYL 10 MG
10 SUPPOSITORY, RECTAL RECTAL DAILY
Status: DISCONTINUED | OUTPATIENT
Start: 2020-02-13 | End: 2020-02-13 | Stop reason: HOSPADM

## 2020-02-13 RX ORDER — MAGNESIUM CARB/ALUMINUM HYDROX 105-160MG
296 TABLET,CHEWABLE ORAL ONCE
Qty: 296 ML | Refills: 0 | Status: SHIPPED | OUTPATIENT
Start: 2020-02-13 | End: 2020-02-20

## 2020-02-13 RX ORDER — OXYCODONE HYDROCHLORIDE 5 MG/1
5 TABLET ORAL 2 TIMES DAILY PRN
Qty: 15 TABLET | Refills: 0 | Status: SHIPPED | OUTPATIENT
Start: 2020-02-13 | End: 2020-02-21

## 2020-02-13 RX ORDER — MAGNESIUM CARB/ALUMINUM HYDROX 105-160MG
296 TABLET,CHEWABLE ORAL ONCE
Status: DISCONTINUED | OUTPATIENT
Start: 2020-02-13 | End: 2020-02-13 | Stop reason: HOSPADM

## 2020-02-13 RX ORDER — PROPRANOLOL HYDROCHLORIDE 40 MG/5ML
60 SOLUTION ORAL 2 TIMES DAILY
Qty: 105 ML | Refills: 0 | Status: SHIPPED | OUTPATIENT
Start: 2020-02-13 | End: 2020-02-20

## 2020-02-13 RX ADMIN — ACETAMINOPHEN 975 MG: 325 SOLUTION ORAL at 14:47

## 2020-02-13 RX ADMIN — B-COMPLEX W/ C & FOLIC ACID TAB 1 TABLET: TAB at 08:13

## 2020-02-13 RX ADMIN — POLYETHYLENE GLYCOL 3350 17 G: 17 POWDER, FOR SOLUTION ORAL at 08:13

## 2020-02-13 RX ADMIN — DOCUSATE SODIUM 286 ML: 50 LIQUID ORAL at 04:58

## 2020-02-13 RX ADMIN — PROPRANOLOL HYDROCHLORIDE 60 MG: 40 TABLET ORAL at 08:13

## 2020-02-13 RX ADMIN — TIZANIDINE 4 MG: 4 TABLET ORAL at 02:52

## 2020-02-13 RX ADMIN — MELATONIN 2000 UNITS: at 08:13

## 2020-02-13 RX ADMIN — OXYCODONE HYDROCHLORIDE 10 MG: 5 SOLUTION ORAL at 14:46

## 2020-02-13 RX ADMIN — BISACODYL 10 MG: 10 SUPPOSITORY RECTAL at 09:46

## 2020-02-13 RX ADMIN — ACETAMINOPHEN 975 MG: 325 SOLUTION ORAL at 04:58

## 2020-02-13 RX ADMIN — Medication 0.4 MG: at 05:03

## 2020-02-13 RX ADMIN — Medication 0.4 MG: at 00:52

## 2020-02-13 RX ADMIN — ENOXAPARIN SODIUM 40 MG: 40 INJECTION SUBCUTANEOUS at 08:14

## 2020-02-13 RX ADMIN — OXYCODONE HYDROCHLORIDE 10 MG: 5 SOLUTION ORAL at 02:52

## 2020-02-13 RX ADMIN — SENNOSIDES 10 ML: 8.8 SYRUP ORAL at 08:14

## 2020-02-13 RX ADMIN — OXYCODONE HYDROCHLORIDE 10 MG: 5 SOLUTION ORAL at 11:59

## 2020-02-13 RX ADMIN — DOCUSATE SODIUM 100 MG: 50 LIQUID ORAL at 08:13

## 2020-02-13 RX ADMIN — DULOXETINE HYDROCHLORIDE 60 MG: 60 CAPSULE, DELAYED RELEASE ORAL at 08:13

## 2020-02-13 ASSESSMENT — ACTIVITIES OF DAILY LIVING (ADL)
ADLS_ACUITY_SCORE: 14

## 2020-02-13 NOTE — PROGRESS NOTES
CLINICAL NUTRITION SERVICES    Reason for Assessment:  Nutrition education regarding diet s/p nissen or similar surgery    Diet History:  Pt admits to not receiving nutrition education on diet advancement and stages of diet advancement s/p nissen or similar surgery.    Nutrition Diagnosis:  Food- and nutrition-related knowledge deficit r/t no previous knowledge of anticipated diet s/p nissen or similar procedure AEB pt report of not receiving nutrition education on s/p nissen or similar surgery in the past.      Interventions:  Nutrition Education  1.  Provided verbal instruction on diet s/p nissen fundoplication or related surgery.  Discussed foods/beverages at each stage of the diet (clear liquids, full liquids, semi-solid, and regular foods) and anticipated diet.  Also, reviewed helpful hints when starting solid food and ways of dealing with potential side effects post-op.    2.  Provided handout:  Diet Guidelines for a Nissen Fundoplication   3.  Collaboration and Referral of Nutrition care - Pt's G-tube is not intended to be used for TFs unless otherwise specified by the team.     Goals:   1.  Patient will verbalize at least two foods or beverages at each stage of the diet.    2.  Pt to verbalize anticipated duration of each diet stage.      Follow-up:    Patient to ask any further nutrition-related questions before discharge.  In addition, pt may request outpatient RD appointment.      Noemy Collado RD, LD   5A [id number])/7B floor pager [phone]

## 2020-02-13 NOTE — PROGRESS NOTES
STAFF ADDENDUM:  I saw and evaluated Ms. Welch and agree with the resident s findings and plan of care as documented in the resident s note and edited by me, as applicable.      In summary, Ms. Welch is making slow steady progress. We will leave her g-tube to gravity today and clamp it tomorrow. Plan for discharge on POD#3.  The patient had all questions answered and was in agreement with the plan.  Rashad Gardner MD

## 2020-02-13 NOTE — PLAN OF CARE
"Blood pressure 123/85, pulse 78, temperature 97.7  F (36.5  C), temperature source Oral, resp. rate 16, height 1.702 m (5' 7\"), weight 90.9 kg (200 lb 6.4 oz), SpO2 98 %, not currently breastfeeding.    A/O x 4. Denied having pain. No N/V this shift. Fair po intake. PIV SL. Had small loose stools. Voiding good amount.PEG tube clamped. Up ad steve. Possible discharge today. Continue to monitor.     Problem: Adult Inpatient Plan of Care  Goal: Plan of Care Review  2/13/2020 1052 by Yancy Bradford, RN  Outcome: No Change     Problem: Adult Inpatient Plan of Care  Goal: Patient-Specific Goal (Individualization)  2/13/2020 1052 by Yancy Bradford, RN  Outcome: No Change     "

## 2020-02-13 NOTE — DISCHARGE SUMMARY
Discharge medications and instruction reviewed to the patient and her , both verbalized the understanding. PEG tube education done prior to discharge. Took all her belongings.

## 2020-02-13 NOTE — DISCHARGE SUMMARY
NAME: Ada Welch   MRN: [id number]   : 1970     DATE OF ADMISSION: 2/10/2020     PRE/POSTOPERATIVE DIAGNOSES: Hiatal hernia type III    PROCEDURES PERFORMED:  1. Esophagogastroscopy  2. Laparoscopic repair of hiatal hernia with Nissen Fundoplication  3. Gastrostomy tube placement (20 Fr)  4. Left tube thoracostomy (19 Fr Joe)     PATHOLOGY RESULTS:   FINAL DIAGNOSIS; HERNIA SAC, HIATAL, RESECTION:   - Fragments of benign fibroadipose tissue with hemorrhage compatible with   hiatal hernia sac.      CULTURE RESULTS: None.     INTRAOPERATIVE COMPLICATIONS: None.    POSTOPERATIVE COMPLICATIONS: None.    DRAINS/TUBES PRESENT AT DISCHARGE: G tube    DATE OF DISCHARGE:  20    HOSPITAL COURSE: Ada Welch is a 49 year old female who on 2/10/2020 underwent the above-named procedures.  She tolerated the operation well and postoperatively was transferred to the general post-surgical unit.  The remainder of her course was essentially uncomplicated.  Prior to discharge, her pain was controlled well, she was able to perform ADLs and ambulate independently without difficulty, and had full return of bowel and bladder function.  On 20, she was discharged to home in stable condition with assitance in place.    DISCHARGE INSTRUCTIONS:  Discharge Procedure Orders   X-ray UGI   Standing Status: Future Standing Exp. Date: 20   Order Comments: Please schedule to be done just prior to visit at surgery center     Order Specific Question Answer Comments   Priority Routine    Clinical Info for the Interpreting Provider s/p nissen Fundaplication follow up visit      Reason for your hospital stay   Order Comments: Ada Welch is a 49 year old female who is s/p Laparoscopic Hiatal Hernia Repair and Nissen fundoplication with EGD and PEG tube placement     [facility] Follow-up and recommended labs and tests   Order Comments: Follow up with primary care provider, Ellyn Rivera, within 7 days to  evaluate after surgery and for hospital follow- up.  No follow up labs or test are needed.    Follow up with pain medicine care provider,Dr Martinez , within 7-14 days, to evaluate medication change. No follow up labs or test are needed.   Follow up with me, Dr Mack in 1 month with UGI. to evaluate after surgery, for hospital follow- up and to follow up on results.  The following labs/tests are recommended: UGI.  Follow up with a thoracic surgery Clinical Nurse Specialist in 1 week for a check of your PEG tube and the insertion site.    Appointments on [place] and/or [facility] (with [facility] or [facility] provider or service). Call [phone] if you haven't heard regarding these appointments within 7 days of discharge.     Activity   Order Comments: Your activity upon discharge: activity as tolerated and no driving for today     Order Specific Question Answer Comments   Is discharge order? Yes      Tubes and drains   Order Comments: You are going home with the following tubes or drains: feeding tube G-Tube.   Follow these instructions  to care for your tube    Skin care:  Clean the skin around your tube one or two times each day. Check for redness and swelling in the area where the tube goes into your body. Check for fluid draining from the hole where the tube was put in. Ask your caregiver how often to clean the skin around your tube, and what you should use to clean it.    You may need to put barrier or antibiotic (germ-killing) cream on the skin around your tube after you are done cleaning it. If there is an infection, your caregiver may give you antibiotic medicine to take as pills.  Keep the skin around your tube dry. Put clean bandages over the tube area for the first 24 hours (one day) after the tube is put in. After 24 hours, if the skin around the tube looks dry, you may not need to use bandages. If the hole where the tube enters your body is draining fluid, put antibiotic cream on the skin around your tube.  Cover the area with bandages, and call your caregiver.   If there are no stitches holding your PEG tube in place on your skin, gently turn the tube. This may decrease pressure on your skin, and help prevent an infection. Ask your caregiver if you should turn your PEG tube, and how to do it correctly.     Flushes  Flush your feeding tube with 30cc of water;  After medication administration through the feeding tube  After tube feeds  Every 4 hours while awake if you have not used the tube during that time     Follow up with a thoracic surgery Clinical Nurse Specialist in 1 week for a check of your PEG tube and the insertion site.     Discharge Instructions   Order Comments: THORACIC SURGERY DISCHARGE INSTRUCTIONS    DIET: Clear liquid diet at time of discharge.  Follow post Nissen diet recommendations as discussed with dietician and detailed in provided handout    Take the medications in solution form provided by the pharmacy, after these solutions run out, you may return to taking them in tablet or capsule form from your home supply.     Given your recent procedure to correct or minimize your reflux, you should no longer need to take your anti-acid medication regularly.     If your plans upon discharge include prolonged periods of sitting (i.e a lengthy car or plane ride), it is highly beneficial to get up and walk at least once per hour to help prevent swelling and blood clots.     You may remove chest tube dressing 48 hours after tube removal and bandage the site at your own discretion thereafter.  Small amounts of leakage are normal for 2-3 days after removal.  Feel free to call with questions.    You may get incision wet 2 days after operation. Do not submerge, soak, or scrub incision or swim until seen in follow-up.    Take incentive spirometer home for continued frequent use    Activity as tolerated, no strenous activity until seen in follow-up, no lifting greater than 10 pounds for the next 8-10 weeks.    Stay  "hydrated. Take over the counter fiber (metamucil or benefiber) and stool softeners (Miralax, docusate or senna) if becoming constipated.     Call for fever greater than 101.5, chills, increased size of incision, red skin around incision, vision changes, muscle strength changes, sensation changes, shortness of breath, or other concerns.    No driving while taking narcotic pain medication.    Transition to ibuprofen or tylenol/acetaminophen for pain control. Do not take tylenol/acetaminophen and acetaminophen containing narcotic (e.g., percocet or vicodin) at the same time. If you have known ulcer problems, or kidney trouble (elevated creatinine) do not take the ibuprofen.    In emergencies, call 911    For other Questions or Concerns;   A.) During weekday working hours (Monday through Friday 8am to 4:30pm)   call [phone] and ask to speak to a clinical nurse specialist.     B.) At nights (after 4:30pm), on weekends, or if urgent call [phone] and   tell the  \"I would like to page job code [phone], the thoracic surgery   fellow on call, please.\"     Full Code     Order Specific Question Answer Comments   Code status determined by: Unable to determine; FULL CODE until documents or legal decision maker available      Diet   Order Comments: Follow this diet upon discharge: Orders Placed This Encounter      Full Liquid Diet     Order Specific Question Answer Comments   Is discharge order? Yes        DISCHARGE MEDICATIONS:    Scarlett Welch L   Home Medication Instructions GAMAL:[id number]    Printed on:02/13/20 5867   Medication Information                      ACETAMINOPHEN PO  Take 1,000 mg by mouth every 6 hours as needed for pain Take with tramadol             albuterol (PROAIR HFA/PROVENTIL HFA/VENTOLIN HFA) 108 (90 BASE) MCG/ACT Inhaler  Inhale 2 puffs into the lungs every 6 hours as needed              amitriptyline (ELAVIL) 10 MG tablet  Take 1 tablet (10 mg) by mouth At Bedtime           "   buprenorphine (BUTRANS) 10 MCG/HR WK patch  Place 1 patch onto the skin once a week             cetirizine (ZYRTEC) 10 MG tablet  Take 10 mg by mouth daily as needed              DULoxetine (CYMBALTA) 30 MG capsule  Take 60mg in the morning and 30mg at bedtime             EPINEPHrine (EPIPEN/ADRENACLICK/OR ANY BX GENERIC EQUIV) 0.3 MG/0.3ML injection 2-pack  INJECT 0.3 MLS (0.3 MG) INTO THE MUSCLE AS NEEDED FOR ANAPHYLAXIS             famotidine (PEPCID) 20 MG tablet  Take 1 tablet (20 mg) by mouth At Bedtime             ferrous fumarate 65 mg, [place]. FE,-Vitamin C 125 mg (VITRON-C)  MG TABS tablet  Take 1 tablet by mouth every other day             fluticasone (FLONASE) 50 MCG/ACT nasal spray  INSTILL 2 SPRAYS INTO BOTH NOSTRILS DAILY             ivabradine (CORLANOR) 7.5 MG tablet  Take 1 tablet (7.5 mg) by mouth 2 times daily (with meals)             levothyroxine (SYNTHROID/LEVOTHROID) 25 MCG tablet  Take 1 tablet (25 mcg) by mouth daily             Lidocaine (LIDOCARE) 4 % Patch  Place 1-3 patches onto the skin every 24 hours To prevent lidocaine toxicity, patient should be patch free for 12 hrs daily.             magnesium citrate 1.745 GM/30ML solution  Take 296 mLs by mouth once for 1 dose             medical cannabis (Patient's own supply.  Not a prescription)  Take 1 Dose by mouth See Admin Instructions (This is NOT a prescription, and does not certify that the patient has a qualifying medical condition for medical cannabis.  The purpose of this order is  to document that the patient reports taking medical cannabis.)     Capsule formulation - uses as needed (currently out of this and not using as of January 28, 2020)             metoclopramide (REGLAN) 10 MG tablet  Take 1 tablet (10 mg) by mouth 4 times daily (before meals and nightly)             Multiple Vitamins-Minerals (MULTIVITAMIN PO)  Take 1 tablet by mouth daily              naloxone (NARCAN) 4 MG/0.1ML nasal spray  Spray 1 spray (4 mg)  into one nostril alternating nostrils once as needed for opioid reversal every 2-3 minutes until assistance arrives             naproxen sodium (ANAPROX) 220 MG tablet  Take 440-660 mg by mouth daily as needed for moderate pain             omeprazole (PRILOSEC) 40 MG DR capsule  Take 1 capsule (40 mg) by mouth 2 times daily as needed (prn)             ondansetron (ZOFRAN-ODT) 4 MG ODT tab  Take 1-2 tablets (4-8 mg) by mouth every 12 hours as needed for nausea             oxyCODONE (ROXICODONE) 5 MG tablet  Take 1 tablet (5 mg) by mouth 2 times daily as needed for pain             polyethylene glycol (MIRALAX/GLYCOLAX) packet  17 g by Oral or G tube route 2 times daily             Probiotic Product (PROBIOTIC DAILY PO)  Take 2 packets by mouth every morning              propranolol ER (INDERAL LA) 120 MG 24 hr capsule  Take 1 capsule (120 mg) by mouth daily             rizatriptan (MAXALT-MLT) 5 MG ODT  TAKE 1-2 TABLETS (5-10 MG) BY MOUTH AT ONSET OF HEADACHE FOR MIGRAINE (MAX 30 MG IN 24 HOURS)             sennosides (SENOKOT) 8.8 MG/5ML syrup  10 mLs by Oral or G tube route 2 times daily             SUMAtriptan (IMITREX STATDOSE) 6 MG/0.5ML pen injector kit  Inject 0.5 ml (6 mg) subcutaneously at onset of migraine, May repeat in 1 hour , Max 12 mg/24 hrs             tiZANidine (ZANAFLEX) 4 MG tablet  Take 1 tablet (4 mg) by mouth 4 times daily as needed for muscle spasms             vitamin B complex with vitamin C (VITAMIN  B COMPLEX) TABS tablet  Take 1 tablet by mouth daily             VITAMIN D, CHOLECALCIFEROL, PO  Take 2,000 Units by mouth daily

## 2020-02-13 NOTE — PLAN OF CARE
"/85 (BP Location: Left arm)   Pulse 74   Temp 98.5  F (36.9  C) (Oral)   Resp 16   Ht 1.702 m (5' 7\")   Wt 90.9 kg (200 lb 6.4 oz)   SpO2 97%   BMI 31.39 kg/m    Activity: Up ad steve  Neuros: WDL  Cardiac: WDL, scheduled propranolol and PRN ivabradine for tachycardia  Respiratory: WDL, sating well on RA  GI/: No BM since 2/10.Enema given at 0500 per pt request, small liquid BM   Diet: Clears  Lines: PIV SL  Incisions/Drains: PEG tube clamped  Pain/nausea: C/o sharp pain throughout RUQ and tube site. Oxycodone, IV   Plan:   continue POC    "

## 2020-02-13 NOTE — PLAN OF CARE
Unit 7B: Patient politely declined PT this PM as she does not want to increase her pain so close to discharge. Patient reports she has appropriate ADs at home and feels comfortable with abdominal precautions. Patient has no further mobility questions or concerns and has met all IP PT goals. At this time, PT will complete orders.    Physical Therapy Discharge Summary    Reason for therapy discharge:    All goals and outcomes met, no further needs identified.    Progress towards therapy goal(s). See goals on Care Plan in [facility] electronic health record for goal details.  Goals met    Therapy recommendation(s):    No further therapy is recommended.

## 2020-02-13 NOTE — PLAN OF CARE
Discharge Planner PT   Patient plan for discharge: home  Current status: PT: Pt supine sleeping, upon waking agreeable to working with PT. Pt transfers sup<>sit with edu on purpose of hooklying to  initiate rolling. Pt then able to complete sup<>sit SBA, sit<>stand SBA and toileting SBA with help for IV management due to lines. Seated rest with lean to R to open lateral rib space to ease breathing, helps with pain. Pt ambulates with and without IV pole CGA progressing to SBA. Edu on posture, breathing mechanics to chest breathing vs some lateral lean to R to open Lrib space and pt finds pain relieve with slight lean  to R for now (onto IV pole helpful). Pt able to asc/desc 4 stairs with single rail progressing to SBA. Issued HEP but will initiate next session.  Barriers to return to prior living situation: medical status, improving functional status  Recommendations for discharge: home with  A  Rationale for recommendations: pt making good progress with mobility       Entered by: Giselle Schofield 02/13/2020 7:42 AM

## 2020-02-13 NOTE — TELEPHONE ENCOUNTER
Pt discharged home with and instruct to resume PTA medication and pill formulation of new rx given. However, because of procedure, patient should be on a week long of liquid pain medication , it is ok for small pills but all extended release form have to be changed to crushable  Immediate release pills.  I was able to reach patient via daughter cell phone [phone] ( Lani)  after obtaining number from .   Pt would like Rx sent to her home pharmacy at [facility].    Sent Rx electronically for propanolol and tylenol      Pt will pick it up today    Monica NICKERSON  General Surgery (PGY-1)  Pager [phone]

## 2020-02-19 ENCOUNTER — TELEPHONE (OUTPATIENT)
Dept: SURGERY | Facility: CLINIC | Age: 50
End: 2020-02-19

## 2020-02-19 NOTE — TELEPHONE ENCOUNTER
Called Scarlett to clarify about coming up appointments. Scarlett stated being frustrated with having no education about G tube except for how to clean it. She stated she was not sent home with any supplies. I apologized to her about the experience she had with her discharge. I let her know we will see her tomorrow and go over more education and answering any other questions she might have. She was thankful to be able to come in tomorrow and had no further questions at the end of call.

## 2020-02-20 ENCOUNTER — OFFICE VISIT (OUTPATIENT)
Dept: SURGERY | Facility: CLINIC | Age: 50
End: 2020-02-20
Attending: CLINICAL NURSE SPECIALIST
Payer: COMMERCIAL

## 2020-02-20 VITALS
RESPIRATION RATE: 16 BRPM | SYSTOLIC BLOOD PRESSURE: 130 MMHG | WEIGHT: 187 LBS | TEMPERATURE: 97.7 F | OXYGEN SATURATION: 99 % | HEART RATE: 64 BPM | DIASTOLIC BLOOD PRESSURE: 85 MMHG | BODY MASS INDEX: 29.29 KG/M2

## 2020-02-20 DIAGNOSIS — Z46.59 ENCOUNTER FOR CARE RELATED TO FEEDING TUBE: ICD-10-CM

## 2020-02-20 DIAGNOSIS — K44.9 HIATAL HERNIA: Primary | ICD-10-CM

## 2020-02-20 PROBLEM — Z91.199 NON COMPLIANCE WITH MEDICAL TREATMENT: Status: RESOLVED | Noted: 2017-12-09 | Resolved: 2020-02-20

## 2020-02-20 PROBLEM — Z95.9 CARDIAC DEVICE IN SITU: Status: RESOLVED | Noted: 2017-08-30 | Resolved: 2020-02-20

## 2020-02-20 PROCEDURE — G0463 HOSPITAL OUTPT CLINIC VISIT: HCPCS | Mod: ZF

## 2020-02-20 ASSESSMENT — ENCOUNTER SYMPTOMS
EYE PAIN: 0
ARTHRALGIAS: 1
TASTE DISTURBANCE: 0
PALPITATIONS: 1
SPEECH CHANGE: 0
NAUSEA: 1
NECK PAIN: 1
NUMBNESS: 1
EYE WATERING: 1
SMELL DISTURBANCE: 0
NECK MASS: 1
DECREASED APPETITE: 1
COUGH DISTURBING SLEEP: 0
SWOLLEN GLANDS: 1
BLOOD IN STOOL: 0
EYE IRRITATION: 1
DYSPNEA ON EXERTION: 1
SYNCOPE: 1
FLANK PAIN: 1
MUSCLE CRAMPS: 1
EXERCISE INTOLERANCE: 1
CHILLS: 1
BLOATING: 1
JOINT SWELLING: 1
WEAKNESS: 1
STIFFNESS: 1
MEMORY LOSS: 1
DIFFICULTY URINATING: 1
WEIGHT GAIN: 0
MUSCLE WEAKNESS: 1
POLYPHAGIA: 0
LIGHT-HEADEDNESS: 1
EYE REDNESS: 1
BRUISES/BLEEDS EASILY: 1
SNORES LOUDLY: 1
WHEEZING: 0
HEARTBURN: 1
POOR WOUND HEALING: 1
MYALGIAS: 1
HYPERTENSION: 0
SKIN CHANGES: 0
RECTAL PAIN: 0
SINUS CONGESTION: 1
PARALYSIS: 0
SHORTNESS OF BREATH: 1
SEIZURES: 0
ORTHOPNEA: 1
POSTURAL DYSPNEA: 1
HOARSE VOICE: 1
ALTERED TEMPERATURE REGULATION: 1
LOSS OF CONSCIOUSNESS: 1
CONSTIPATION: 0
VOMITING: 0
TREMORS: 0
DISTURBANCES IN COORDINATION: 0
HEMOPTYSIS: 0
ABDOMINAL PAIN: 1
COUGH: 0
LEG PAIN: 1
SLEEP DISTURBANCES DUE TO BREATHING: 0
DOUBLE VISION: 1
NIGHT SWEATS: 1
HOT FLASHES: 0
DYSURIA: 0
DIZZINESS: 1
SPUTUM PRODUCTION: 0
BACK PAIN: 1
FEVER: 0
WEIGHT LOSS: 1
TROUBLE SWALLOWING: 1
HYPOTENSION: 1
TINGLING: 1
JAUNDICE: 0
FATIGUE: 1
SORE THROAT: 0
INCREASED ENERGY: 1
HEMATURIA: 0
POLYDIPSIA: 1
NAIL CHANGES: 1
HALLUCINATIONS: 0
DECREASED LIBIDO: 0
BOWEL INCONTINENCE: 0
DIARRHEA: 1
SINUS PAIN: 1
HEADACHES: 1

## 2020-02-20 ASSESSMENT — PAIN SCALES - GENERAL: PAINLEVEL: SEVERE PAIN (7)

## 2020-02-20 NOTE — PROGRESS NOTES
REASON FOR VISIT: PEG check    TUBE TYPE: 20 Fr Ponsky    DATE PLACED: 02/10/2020    SUBJECTIVE: Scarlett is doing well. She has not needed to use the tube for venting or decompression. She has not had any nausea. She is tolerated a full liquid diet without any difficulties.    OBJECTIVE: Tube site is clean and dry. Surrounding skin is clean, dry and intact.    I instructed Scarlett to flush the tube twice a day with 60 mLs of water. She is doing a great job keeping it clean. There is some drainage around the site which is normal. I suggested a zinc oxide barrier cream.     Scarlett will advance her diet to include soft foods. She is doing a great job with portion sizes.    SITE CARE INSTRUCTIONS: OK to remove dressing to shower. NO submerging in water (hot tubs, pools, etc).     PLAN: Keep follow up appointment with Dr. Mack as scheduled.    Total time spent, 15 minutes, all in counseling and coordination of care.    Lovely Bland, CNS  Thoracic Surgery

## 2020-02-20 NOTE — LETTER
2/20/2020       RE: Ada Welch  [address]  [place] [address]     Dear Colleague,    Thank you for referring your patient, Ada Welch, to the [facility]. Please see a copy of my visit note below.    REASON FOR VISIT: PEG check    TUBE TYPE: 20 Fr Ponsky    DATE PLACED: 02/10/2020    SUBJECTIVE: Scarlett is doing well. She has not needed to use the tube for venting or decompression. She has not had any nausea. She is tolerated a full liquid diet without any difficulties.    OBJECTIVE: Tube site is clean and dry. Surrounding skin is clean, dry and intact.    I instructed Scarlett to flush the tube twice a day with 60 mLs of water. She is doing a great job keeping it clean. There is some drainage around the site which is normal. I suggested a zinc oxide barrier cream.     Scarlett will advance her diet to include soft foods. She is doing a great job with portion sizes.    SITE CARE INSTRUCTIONS: OK to remove dressing to shower. NO submerging in water (hot tubs, pools, etc).     PLAN: Keep follow up appointment with Dr. Mack as scheduled.    Total time spent, 15 minutes, all in counseling and coordination of care.    Lovely Bland, CNS  Thoracic Surgery

## 2020-02-20 NOTE — NURSING NOTE
"Oncology Rooming Note    February 20, 2020 12:30 PM   Ada Welch is a 49 year old female who presents for:    Chief Complaint   Patient presents with     Oncology Clinic Visit     Return Hiatal hernia; PEG check     Initial Vitals: /85   Pulse 64   Temp 97.7  F (36.5  C) (Oral)   Resp 16   Wt 84.8 kg (187 lb)   LMP 01/26/2020 (Approximate)   SpO2 99%   Breastfeeding No   BMI 29.29 kg/m   Estimated body mass index is 29.29 kg/m  as calculated from the following:    Height as of 2/10/20: 1.702 m (5' 7\").    Weight as of this encounter: 84.8 kg (187 lb). Body surface area is 2 meters squared.  Severe Pain (7) Comment: abdominal and back pain   Patient's last menstrual period was 01/26/2020 (approximate).  Allergies reviewed: Yes  Medications reviewed: Yes    Medications: Medication refills not needed today.  Pharmacy name entered into [facility]: [id number] [place] [address]    Clinical concerns: Post op PEG check       Gladis Arredondo CMA              "

## 2020-02-27 DIAGNOSIS — G89.4 CHRONIC PAIN SYNDROME: Primary | ICD-10-CM

## 2020-02-27 RX ORDER — TRAMADOL HYDROCHLORIDE 50 MG/1
50 TABLET ORAL EVERY 6 HOURS PRN
Qty: 60 TABLET | Refills: 1 | Status: SHIPPED | OUTPATIENT
Start: 2020-02-27 | End: 2020-05-21

## 2020-02-27 RX ORDER — TRAMADOL HYDROCHLORIDE 50 MG/1
TABLET ORAL
Qty: 60 TABLET | Refills: 0 | OUTPATIENT
Start: 2020-02-27

## 2020-02-27 NOTE — TELEPHONE ENCOUNTER
Verbal orders given by Dr. Martinez to refill pt's medication for two months.   checked and there are no concerns noted.      Jessica De Souza, RN, BSN

## 2020-03-10 ENCOUNTER — HEALTH MAINTENANCE LETTER (OUTPATIENT)
Age: 50
End: 2020-03-10

## 2020-03-11 NOTE — PROGRESS NOTES
"THORACIC SURGERY FOLLOW UP VISIT    Dear Dr. Rivera,  I saw Ms. Ada Welch in follow-up today. The clinical summary follows:     PREOP DIAGNOSIS   Hiatal Hernia, Type III  PROCEDURE   Laparoscopic hiatal hernia repair, Nissen fundoplication, and PEG tube placement  DATE OF PROCEDURE  2/10/20    COMPLICATIONS  None    INTERVAL STUDIES  Upper GI (3/12/20) - normal      SUBJECTIVE  Overall, symptoms have improved since surgery. Reflux has improved and is no longer taking Prilosec. Is still having some soreness around her stomach area with eating, but has improved since surgery. Previously described the pain \"like a bear clawing\" at her, but now is more like a \"stubbed toe.\" Has dysphagia that was present prior to surgery with nausea about 15 minutes after eating, but no vomiting and not needing to vent her G tube. Has not used her G tube since surgery with minimal leakage around G tube site. Is eating soft foods (oatmeal, tater tot hot dish) and tolerating it. Lost about 12 lbs since surgery, but feels her baseline is around 140lbs.    ETOH Occasional   TOB Former smoker. Quit 5 years ago. On and off for ~20 years.       Physical examination  BMI 28.8  General: Well appearing  Heart: Bradycardic, regular  Lungs: Clear bilaterally  Abd: G Tube in place without any drainage. Laparoscopic incisions well healed.      From a personal perspective, lives in [place], [place] with her  and two kids. She is on disability.     IMPRESSION (K21.9) Gastroesophageal reflux disease, esophagitis presence not specified  (primary encounter diagnosis)   .   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    PLAN  I spent a total of 40 minutes with Ms. Ada Welch, more than 50% of which were spent in counseling, coordination of care, and face-to-face time. I reviewed the plan as follows:  --G " tube removed today  --Patient instructed to call if weight loss becomes a concern    Necessary Tests & Appointments: Followup in one year with upper GI  Pain Control Plan: N/A  Anticoagulation Plan: N/A  Smoking Cessation: N/A  All questions were answered and the patient and present family were in agreement with the plan.  I appreciate the opportunity to participate in the care of your patient and will keep you updated.  Sincerely,

## 2020-03-12 ENCOUNTER — ANCILLARY PROCEDURE (OUTPATIENT)
Dept: GENERAL RADIOLOGY | Facility: CLINIC | Age: 50
End: 2020-03-12
Attending: STUDENT IN AN ORGANIZED HEALTH CARE EDUCATION/TRAINING PROGRAM
Payer: COMMERCIAL

## 2020-03-12 ENCOUNTER — TELEPHONE (OUTPATIENT)
Dept: CARDIOLOGY | Facility: CLINIC | Age: 50
End: 2020-03-12

## 2020-03-12 ENCOUNTER — OFFICE VISIT (OUTPATIENT)
Dept: SURGERY | Facility: CLINIC | Age: 50
End: 2020-03-12
Attending: STUDENT IN AN ORGANIZED HEALTH CARE EDUCATION/TRAINING PROGRAM
Payer: COMMERCIAL

## 2020-03-12 VITALS
DIASTOLIC BLOOD PRESSURE: 65 MMHG | RESPIRATION RATE: 14 BRPM | OXYGEN SATURATION: 99 % | TEMPERATURE: 96.7 F | WEIGHT: 184.1 LBS | SYSTOLIC BLOOD PRESSURE: 92 MMHG | HEIGHT: 67 IN | BODY MASS INDEX: 28.9 KG/M2 | HEART RATE: 44 BPM

## 2020-03-12 DIAGNOSIS — K21.00 GASTROESOPHAGEAL REFLUX DISEASE WITH ESOPHAGITIS: ICD-10-CM

## 2020-03-12 DIAGNOSIS — K20.0 EOSINOPHILIC ESOPHAGITIS: ICD-10-CM

## 2020-03-12 DIAGNOSIS — K21.00 REFLUX ESOPHAGITIS: Primary | ICD-10-CM

## 2020-03-12 PROCEDURE — G0463 HOSPITAL OUTPT CLINIC VISIT: HCPCS | Mod: ZF

## 2020-03-12 RX ORDER — BUTALBITAL, ACETAMINOPHEN AND CAFFEINE 50; 325; 40 MG/1; MG/1; MG/1
TABLET ORAL
COMMUNITY
Start: 2020-01-10 | End: 2020-04-07

## 2020-03-12 ASSESSMENT — ENCOUNTER SYMPTOMS
TROUBLE SWALLOWING: 1
HOARSE VOICE: 1
HOT FLASHES: 0
DIARRHEA: 1
LIGHT-HEADEDNESS: 1
BRUISES/BLEEDS EASILY: 1
HEMOPTYSIS: 0
EYE PAIN: 0
MEMORY LOSS: 1
HEARTBURN: 1
FLANK PAIN: 1
ARTHRALGIAS: 1
TASTE DISTURBANCE: 1
SPEECH CHANGE: 0
EYE REDNESS: 1
RECTAL PAIN: 0
HEADACHES: 1
COUGH: 0
FATIGUE: 1
EYE WATERING: 1
VOMITING: 0
DECREASED LIBIDO: 0
POOR WOUND HEALING: 1
ALTERED TEMPERATURE REGULATION: 1
WEAKNESS: 1
NAIL CHANGES: 1
NECK PAIN: 1
DIZZINESS: 1
LEG PAIN: 1
HYPOTENSION: 1
DIFFICULTY URINATING: 1
SMELL DISTURBANCE: 1
NIGHT SWEATS: 1
HALLUCINATIONS: 0
SLEEP DISTURBANCES DUE TO BREATHING: 0
BOWEL INCONTINENCE: 0
SEIZURES: 0
SNORES LOUDLY: 1
PALPITATIONS: 1
STIFFNESS: 1
ABDOMINAL PAIN: 1
FEVER: 0
NUMBNESS: 1
MYALGIAS: 1
LOSS OF CONSCIOUSNESS: 1
EXERCISE INTOLERANCE: 1
TINGLING: 1
SYNCOPE: 1
SORE THROAT: 0
DOUBLE VISION: 1
BLOOD IN STOOL: 0
INCREASED ENERGY: 1
SPUTUM PRODUCTION: 0
POLYDIPSIA: 1
DISTURBANCES IN COORDINATION: 0
POSTURAL DYSPNEA: 1
MUSCLE WEAKNESS: 1
PARALYSIS: 0
MUSCLE CRAMPS: 1
DYSPNEA ON EXERTION: 1
BLOATING: 1
BACK PAIN: 1
SHORTNESS OF BREATH: 0
WHEEZING: 0
NECK MASS: 1
TREMORS: 1
HEMATURIA: 0
SINUS PAIN: 1
JAUNDICE: 0
DYSURIA: 0
WEIGHT LOSS: 1
HYPERTENSION: 0
JOINT SWELLING: 1
NAUSEA: 1
EYE IRRITATION: 1
SINUS CONGESTION: 1
COUGH DISTURBING SLEEP: 0
WEIGHT GAIN: 0
SKIN CHANGES: 0
CHILLS: 1
POLYPHAGIA: 0
CONSTIPATION: 0
ORTHOPNEA: 1
SWOLLEN GLANDS: 1
DECREASED APPETITE: 1

## 2020-03-12 ASSESSMENT — PAIN SCALES - GENERAL: PAINLEVEL: SEVERE PAIN (6)

## 2020-03-12 ASSESSMENT — MIFFLIN-ST. JEOR: SCORE: 1497.82

## 2020-03-12 NOTE — LETTER
"3/12/2020       RE: Ada Welch  [address] [facility] [address]     Dear Colleague,    Thank you for referring your patient, Ada Welch, to the [facility]. Please see a copy of my visit note below.    THORACIC SURGERY FOLLOW UP VISIT    Dear Dr. Rivera,  I saw Ms. Ada Welch in follow-up today. The clinical summary follows:     PREOP DIAGNOSIS   Hiatal Hernia, Type III  PROCEDURE   Laparoscopic hiatal hernia repair, Nissen fundoplication, and PEG tube placement  DATE OF PROCEDURE  2/10/20    COMPLICATIONS  None    INTERVAL STUDIES  Upper GI (3/12/20) - normal      SUBJECTIVE  Overall, symptoms have improved since surgery. Reflux has improved and is no longer taking Prilosec. Is still having some soreness around her stomach area with eating, but has improved since surgery. Previously described the pain \"like a bear clawing\" at her, but now is more like a \"stubbed toe.\" Has dysphagia that was present prior to surgery with nausea about 15 minutes after eating, but no vomiting and not needing to vent her G tube. Has not used her G tube since surgery with minimal leakage around G tube site. Is eating soft foods (oatmeal, tater tot hot dish) and tolerating it. Lost about 12 lbs since surgery, but feels her baseline is around 140lbs.    ETOH Occasional   TOB Former smoker. Quit 5 years ago. On and off for ~20 years.       Physical examination  BMI 28.8  General: Well appearing  Heart: Bradycardic, regular  Lungs: Clear bilaterally  Abd: G Tube in place without any drainage. Laparoscopic incisions well healed.      From a personal perspective, lives in [place], [place] with her  and two kids. She is on disability.     IMPRESSION (K21.9) Gastroesophageal reflux disease, esophagitis presence not specified  (primary encounter diagnosis)   .   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    PLAN  I spent a total of 40 minutes with Ms. Ada Welch, more than 50% of which were spent in counseling, coordination of care, and face-to-face time. I reviewed the plan as follows:  --G tube removed today  --Patient instructed to call if weight loss becomes a concern    Necessary Tests & Appointments: Followup in one year with upper GI  Pain Control Plan: N/A  Anticoagulation Plan: N/A  Smoking Cessation: N/A  All questions were answered and the patient and present family were in agreement with the plan.  I appreciate the opportunity to participate in the care of your patient and will keep you updated.  Again, thank you for allowing me to participate in the care of your patient.      Sincerely,    Alana Mack MD

## 2020-03-12 NOTE — NURSING NOTE
"Oncology Rooming Note    March 12, 2020 10:17 AM   Ada Welch is a 49 year old female who presents for:    Chief Complaint   Patient presents with     Oncology Clinic Visit     Return; Haital Hernia     Initial Vitals: BP 92/65 (BP Location: Left arm, Patient Position: Chair, Cuff Size: Adult Regular)   Pulse (!) 44   Temp 96.7  F (35.9  C) (Oral)   Resp 14   Ht 1.702 m (5' 7.01\")   Wt 83.5 kg (184 lb 1.6 oz)   SpO2 99%   BMI 28.83 kg/m   Estimated body mass index is 28.83 kg/m  as calculated from the following:    Height as of this encounter: 1.702 m (5' 7.01\").    Weight as of this encounter: 83.5 kg (184 lb 1.6 oz). Body surface area is 1.99 meters squared.  Severe Pain (6) Comment: Data Unavailable   No LMP recorded.  Allergies reviewed: Yes  Medications reviewed: Yes    Medications: Medication refills not needed today.  Pharmacy name entered into [employer]: [id number] [place] [address]    Clinical concerns: No new concerns.        Andreea Islas CMA              "

## 2020-03-12 NOTE — TELEPHONE ENCOUNTER
Prior Authorization Specialty Medication Request    Medication/Dose: Ivabradine (CORLANOR) 7.5mg tabs: 1 tab BID.  ICD code (if different than what is on RX):     Autonomic Dysfunction G90.9   Postural Orthostatic Tachycardia Syndrome I95.1   Sinus Tachycardia R00.0   Syncope R55  Previously Tried and Failed:  N/A    Important Lab Values: N/A  Rationale: Ivabradine needed for persistent sinus tachycardia  & dysautonomia not controlled by lifestyle changes.    Insurance Name: MEDICA CHOICE (SECONDARY) - use secondary per pt.  Insurance ID: [id number]   Insurance Phone Number: ?    Pharmacy Information (if different than what is on RX)  [id number] [place] [address]    Phone: [phone], FAX: [phone]

## 2020-03-13 NOTE — TELEPHONE ENCOUNTER
PA Initiation    Medication: Ivabradine (CORLANOR) 7.5mg tabs -   Insurance Company: [employer] - Phone [phone] Fax [phone]  [id number] [place] [facility]  Filling Pharmacy Phone: [phone]  Filling Pharmacy Fax: [phone]  Start Date: 3/13/2020

## 2020-03-17 RX ORDER — OMEPRAZOLE 40 MG/1
40 CAPSULE, DELAYED RELEASE ORAL 2 TIMES DAILY PRN
Qty: 60 CAPSULE | Refills: 1 | Status: SHIPPED | OUTPATIENT
Start: 2020-03-17 | End: 2020-07-14

## 2020-03-18 ENCOUNTER — MYC MEDICAL ADVICE (OUTPATIENT)
Dept: INTERNAL MEDICINE | Facility: CLINIC | Age: 50
End: 2020-03-18

## 2020-03-18 NOTE — TELEPHONE ENCOUNTER
"PRIOR AUTHORIZATION DENIED    Medication: Ivabradine (CORLANOR) 7.5mg tabs - DENIED    Denial Date: 3/13/2020    Denial Rational:     Patient must have a LVEF of less than or equal to 35%    Appeal Information:     **Please advise if appeal is necessary and place a letter of medical necessity with clinical rationale under the \"letters\" tab in patient's chart and route back to [facility] [[id number]]        "

## 2020-03-19 DIAGNOSIS — K44.9 HIATAL HERNIA: Primary | ICD-10-CM

## 2020-03-31 ENCOUNTER — VIRTUAL VISIT (OUTPATIENT)
Dept: INTERNAL MEDICINE | Facility: CLINIC | Age: 50
End: 2020-03-31
Payer: COMMERCIAL

## 2020-03-31 DIAGNOSIS — J32.0 MAXILLARY SINUSITIS, UNSPECIFIED CHRONICITY: Primary | ICD-10-CM

## 2020-03-31 RX ORDER — TRAMADOL HYDROCHLORIDE 50 MG/1
TABLET ORAL
COMMUNITY
Start: 2020-03-12 | End: 2020-06-29

## 2020-03-31 NOTE — PROGRESS NOTES
"Chief Complaint   Patient presents with     Sinus Problem     pt suspects sinus infection. causing migraines and tooth pain. HX of chronic sinus infection     Pt has tried rinses and nasal spray. Pt hoping for antibiotics, has \"tried everything else\"    Pt reports no fever or coughing    Kimberly Nissen, EMT at 12:23 PM on 3/31/2020    Phone call regarding swollen face sinus pain radiating to teeth.  Symptoms started in January by her report.  Has tried flonase and sinus rinse.  Unable to take quinolones. Will treat with Augmentin   Jay Epps MD      "

## 2020-04-03 DIAGNOSIS — M96.1 POSTLAMINECTOMY SYNDROME: ICD-10-CM

## 2020-04-03 DIAGNOSIS — G89.4 CHRONIC PAIN SYNDROME: ICD-10-CM

## 2020-04-03 RX ORDER — BUPRENORPHINE 10 UG/H
1 PATCH TRANSDERMAL WEEKLY
Qty: 4 PATCH | Refills: 1 | Status: SHIPPED | OUTPATIENT
Start: 2020-04-03 | End: 2020-06-25

## 2020-04-03 NOTE — TELEPHONE ENCOUNTER
Butrans patch refilled.  checked and is appropriate.  Last refill 2/23/20.  Prescription sent into pt's pharmacy.     Jessica De Souza, RN, BSN

## 2020-04-06 ENCOUNTER — MYC MEDICAL ADVICE (OUTPATIENT)
Dept: NEUROLOGY | Facility: CLINIC | Age: 50
End: 2020-04-06

## 2020-04-06 DIAGNOSIS — G43.719 INTRACTABLE CHRONIC MIGRAINE WITHOUT AURA AND WITHOUT STATUS MIGRAINOSUS: Primary | ICD-10-CM

## 2020-04-07 RX ORDER — BUTALBITAL, ACETAMINOPHEN AND CAFFEINE 50; 325; 40 MG/1; MG/1; MG/1
TABLET ORAL
Qty: 10 TABLET | Refills: 0 | Status: SHIPPED | OUTPATIENT
Start: 2020-04-07 | End: 2020-09-24

## 2020-04-16 ENCOUNTER — MYC MEDICAL ADVICE (OUTPATIENT)
Dept: INTERNAL MEDICINE | Facility: CLINIC | Age: 50
End: 2020-04-16

## 2020-04-17 ENCOUNTER — MYC MEDICAL ADVICE (OUTPATIENT)
Dept: INTERNAL MEDICINE | Facility: CLINIC | Age: 50
End: 2020-04-17

## 2020-04-21 ENCOUNTER — TELEPHONE (OUTPATIENT)
Dept: NEUROLOGY | Facility: CLINIC | Age: 50
End: 2020-04-21

## 2020-04-21 ENCOUNTER — MYC MEDICAL ADVICE (OUTPATIENT)
Dept: CARDIOLOGY | Facility: CLINIC | Age: 50
End: 2020-04-21

## 2020-04-21 ENCOUNTER — VIRTUAL VISIT (OUTPATIENT)
Dept: NEUROLOGY | Facility: CLINIC | Age: 50
End: 2020-04-21
Payer: COMMERCIAL

## 2020-04-21 DIAGNOSIS — G43.719 INTRACTABLE CHRONIC MIGRAINE WITHOUT AURA AND WITHOUT STATUS MIGRAINOSUS: Primary | ICD-10-CM

## 2020-04-21 RX ORDER — UBROGEPANT 100 MG/1
100 TABLET ORAL
Qty: 10 TABLET | Refills: 5 | Status: SHIPPED | OUTPATIENT
Start: 2020-04-21 | End: 2020-09-24

## 2020-04-21 RX ORDER — METOCLOPRAMIDE 10 MG/1
10 TABLET ORAL 4 TIMES DAILY PRN
Qty: 40 TABLET | Refills: 3 | Status: SHIPPED | OUTPATIENT
Start: 2020-04-21 | End: 2020-07-28

## 2020-04-21 ASSESSMENT — PAIN SCALES - GENERAL: PAINLEVEL: EXTREME PAIN (8)

## 2020-04-21 NOTE — TELEPHONE ENCOUNTER
Prior Authorization Approval    Authorization Effective Date: 3/22/2020  Authorization Expiration Date: 4/21/2021  Medication: Ubrelvy 100 mg- APPROVED  Approved Dose/Quantity:   Reference #:     Insurance Company:    Expected CoPay:       CoPay Card Available:      Foundation Assistance Needed:    Which Pharmacy is filling the prescription (Not needed for infusion/clinic administered): [id number] [place], [place] - [address] [place] [address]  Pharmacy Notified: Yes-Pharmacy will notify patient when ready.  Patient Notified: No

## 2020-04-21 NOTE — PROGRESS NOTES
"Ada Welch is a 49 year old female who is being evaluated via a billable video visit.      The patient has been notified of following:     \"This video visit will be conducted via a call between you and your physician/provider. We have found that certain health care needs can be provided without the need for an in-person physical exam.  This service lets us provide the care you need with a video conversation.  If a prescription is necessary we can send it directly to your pharmacy.  If lab work is needed we can place an order for that and you can then stop by our lab to have the test done at a later time.    Video visits are billed at different rates depending on your insurance coverage.  Please reach out to your insurance provider with any questions.    If during the course of the call the physician/provider feels a video visit is not appropriate, you will not be charged for this service.\"    Patient has given verbal consent for Video visit? YES    How would you like to obtain your AVS? Gil    Patient would like the video invitation sent by: [phone]  CURRENTLY ONLY ACCESS TO PHONE    Will anyone else be joining your video visit? 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  The patient reports that for the past week she has had an increased headache that feels worse when she is upright.  She has previously been managed with intermittent blood patches that seem to help.  She obviously recognizes at this point in time it is difficult for her to get a blood patch due to COVID-19.    She does note that she has a severe headache worse on the left side and also feels as though the left side of her face is weak.  On examination via video visit I do not appreciate any signs of clear weakness there might be a slight flattening of nasolabial fold on the left side compared to the right but when she smiles this evens out.    The patient was given " refills for Fioricet last week.  I refilled Reglan today and we will also try Ubrevly.  I reviewed the rebound headache risk and encouraged her to stay hydrated and to try bedrest for a few days.  She will call me in a week if she still having a significant headache flare and we will discuss the risk-benefit analysis of the another blood patch.    Noemy Nguyen MD FAAN  Department of Neurology  Pager [phone]            Video-Visit Details    Type of service:  Video Visit    Video End Time (time video stopped): 12:16    Originating Location (pt. Location): Home    Distant Location (provider location):  [facility] NEUROLOGY     Mode of Communication:  Video Conference via Vikas Araujo, EMT

## 2020-04-21 NOTE — TELEPHONE ENCOUNTER
Prior Authorization Retail Medication Request    Medication/Dose: Ubrelvy 100 mg  ICD code (if different than what is on RX):  Tom Danlos Syndrome  Previously Tried and Failed:  Intermittent blood patches  Rationale:  Because of covid it is difficult to get her in for a blood patch will try Ubrelvy.  No rebound headache risk    Insurance Name:  [employer]  Insurance ID:  [id number]    Pharmacy Information (if different than what is on RX)  Name:   Phone:

## 2020-04-22 ENCOUNTER — TELEPHONE (OUTPATIENT)
Dept: GASTROENTEROLOGY | Facility: CLINIC | Age: 50
End: 2020-04-22

## 2020-04-23 ENCOUNTER — TELEPHONE (OUTPATIENT)
Dept: ANESTHESIOLOGY | Facility: CLINIC | Age: 50
End: 2020-04-23

## 2020-04-23 NOTE — TELEPHONE ENCOUNTER
[facility] Call Center    Phone Message    May a detailed message be left on voicemail: yes     Reason for Call: Medication Refill Request    Has the patient contacted the pharmacy for the refill? Yes   Name of medication being requested: Tramadol 15 mg  Provider who prescribed the medication:   Pharmacy: [facility] pharmacy  Date medication is needed: 04/23/2020        Action Taken: Message routed to:  [facility] ([facility] Pain    Travel Screening: Not Applicable

## 2020-04-24 ENCOUNTER — VIRTUAL VISIT (OUTPATIENT)
Dept: INTERNAL MEDICINE | Facility: CLINIC | Age: 50
End: 2020-04-24
Payer: COMMERCIAL

## 2020-04-24 ENCOUNTER — VIRTUAL VISIT (OUTPATIENT)
Dept: ANESTHESIOLOGY | Facility: CLINIC | Age: 50
End: 2020-04-24
Payer: COMMERCIAL

## 2020-04-24 DIAGNOSIS — K44.9 HIATAL HERNIA: Primary | ICD-10-CM

## 2020-04-24 DIAGNOSIS — M96.1 POSTLAMINECTOMY SYNDROME: Primary | ICD-10-CM

## 2020-04-24 DIAGNOSIS — G89.4 PAIN SYNDROME, CHRONIC: ICD-10-CM

## 2020-04-24 DIAGNOSIS — Q07.00 ARNOLD-CHIARI DEFORMITY (H): ICD-10-CM

## 2020-04-24 DIAGNOSIS — G44.219 EPISODIC TENSION-TYPE HEADACHE, NOT INTRACTABLE: ICD-10-CM

## 2020-04-24 DIAGNOSIS — G90.9 AUTONOMIC DYSFUNCTION: ICD-10-CM

## 2020-04-24 RX ORDER — TRAMADOL HYDROCHLORIDE 50 MG/1
50 TABLET ORAL EVERY 6 HOURS PRN
Qty: 60 TABLET | Refills: 0 | Status: SHIPPED | OUTPATIENT
Start: 2020-04-24 | End: 2020-06-25

## 2020-04-24 ASSESSMENT — PAIN SCALES - GENERAL: PAINLEVEL: SEVERE PAIN (7)

## 2020-04-24 NOTE — PROGRESS NOTES
"Ada Welch is a 49 year old female who is being evaluated via a billable video visit.      The patient has been notified of following:     \"This video visit will be conducted via a call between you and your physician/provider. We have found that certain health care needs can be provided without the need for an in-person physical exam.  This service lets us provide the care you need with a video conversation.  If a prescription is necessary we can send it directly to your pharmacy.  If lab work is needed we can place an order for that and you can then stop by our lab to have the test done at a later time.    Video visits are billed at different rates depending on your insurance coverage.  Please reach out to your insurance provider with any questions.    If during the course of the call the physician/provider feels a video visit is not appropriate, you will not be charged for this service.\"    Patient has given verbal consent for Video visit? Yes    How would you like to obtain your AVS? Gil    Patient would like the video invitation sent by: Text to cell phone: [phone]    Will anyone else be joining your video visit? No        Recommendations from last visit 1/23/2020:  1. Medications.   -Continue transdermal Butrans 10 mcg per hour q 7 days earliest refill date of 1/25/2020  -Continue propranolol 120 mg p.o. daily  -Continued Zanaflex 4 mg 4 times daily as needed  -Continue tramadol 50 mg p.o. every 6 hours as needed.  prescription of 60 tablets given today  -Oxycodone 2.5 mg daily as needed.  15 tablets dispensed today.  -Amitriptyline 50 mg nightly  -Cymbalta continued 60 mg AM and 30 mg PM  -Maxalt 5-10 mg p.o. at onset of the headache for migraine (max 30 mg in 24 hours)        2. Interventional procedures:  The patient had post dural puncture headache after epidural blood patch performed at an outside facility.  Recommend to perform epidural blood patch under fluoroscopic guidance.  If the patient " decides to perform epidural blood patch for benign intracranial hypertension, I recommend that she is placed in a Trendelenburg position for 30 minutes after the procedure.  Then she will need to be placed in the prone position for another 30 minutes.       3. Labs and imaging:   -None needed for pain management.      4. Rehab:    -Not discussed today.     5. Psychology:   -No current needs.      6. Disposition:  The patient will follow up in our outpatient clinic in 12 weeks or earlier if clinically indicated.        Interval History:  This is a 49-year-old female patient who is known to the pain clinic.  She follows up today via video visit regarding her chronic low back pain and chronic neck pain. The patient has very complex history of low back pain for ~25 years for which she underwent L4-S1 fusion (TLIF) 12/9/13.  Postoperatively, her back pain worsened (mainly LBP, but also radiated down both lower limbs in an S1 dermatomal distribution).   Underwent removal of posterior instrumentation (screws and guadalupe).  After fusion removal, her pain continued.  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  She even sought care for her EDS by a specialist in [place],  who she saw twice.     Today she reports that her main concern is her neck pain.  It is a 7 out of 10 today.  She states it is at the base of her skull and it does radiate into her left shoulder and at times it can go down her entire left arm into her fourth and fifth digits sometimes her third digit as well.  She states that the Butrans patch does not help her neck pain, it helps her low back pain however.  She does take tramadol which helps her neck pain.  She uses Biofreeze topically as well as a topical medical cannabis.  She also takes tizanidine 4 mg 4 times daily which is incredibly helpful.  She is no longer taking amitriptyline because her cardiologist did not want her to due to possible side effects.  She has not taken amitriptyline for  several months.  She was taking it for sleep.    Today she is requesting refills of her tramadol.      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     Cervicalgia      Chronic sinusitis 00/2007    Diagnosed with chronic pansinusitis 2016     CSF leak     Chiari malformation     Depression      Tom-Danlos disease      Eosinophilic esophagitis 08/2018     Gastroesophageal reflux disease 3/1/2017    I have large hiatal hernia     Hiatal hernia 2016    Have adeline hiatel hernia     Hoarseness Unsure    It comes and goes     Hypothyroid      IBS (irritable bowel syndrome) with constipation     improved on Linzess     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     Migraines 1/1/2007    Unsure of exact date     Nasal polyps 2013    Were revoved 03/2017, benign     Orthostatic hypotension      Other nervous system complications 1/2014    Autonomic nervous system disorder, neuralgia, neuropathy     Swelling, mass, or lump in head and neck 08/2016    Lymph node has been growing for last year     Thyroid disease 01/01/2008     Urinary incontinence      Urinary urgency      Current Outpatient Medications   Medication     albuterol (PROAIR HFA/PROVENTIL HFA/VENTOLIN HFA) 108 (90 BASE) MCG/ACT Inhaler     amitriptyline (ELAVIL) 10 MG tablet     buprenorphine (BUTRANS) 10 MCG/HR WK patch     butalbital-acetaminophen-caffeine (ESGIC) -40 MG tablet     cetirizine (ZYRTEC) 10 MG tablet     DULoxetine (CYMBALTA) 30 MG capsule     EPINEPHrine (EPIPEN/ADRENACLICK/OR ANY BX GENERIC EQUIV) 0.3 MG/0.3ML injection 2-pack     famotidine (PEPCID) 20 MG tablet     ferrous fumarate 65 mg, [place] IRA. FE,-Vitamin C 125  mg (VITRON-C)  MG TABS tablet     fluticasone (FLONASE) 50 MCG/ACT nasal spray     ivabradine (CORLANOR) 7.5 MG tablet     levothyroxine (SYNTHROID/LEVOTHROID) 25 MCG tablet     Lidocaine (LIDOCARE) 4 % Patch     medical cannabis (Patient's own supply.  Not a prescription)     metoclopramide (REGLAN) 10 MG tablet     Multiple Vitamins-Minerals (MULTIVITAMIN PO)     naproxen sodium (ANAPROX) 220 MG tablet     omeprazole (PRILOSEC) 40 MG DR capsule     ondansetron (ZOFRAN-ODT) 4 MG ODT tab     Probiotic Product (PROBIOTIC DAILY PO)     propranolol ER (INDERAL LA) 120 MG 24 hr capsule     rizatriptan (MAXALT-MLT) 5 MG ODT     SUMAtriptan (IMITREX STATDOSE) 6 MG/0.5ML pen injector kit     tiZANidine (ZANAFLEX) 4 MG tablet     traMADol (ULTRAM) 50 MG tablet     ubrogepant (UBRELVY) 100 MG tablet     vitamin B complex with vitamin C (VITAMIN  B COMPLEX) TABS tablet     VITAMIN D, CHOLECALCIFEROL, PO     naloxone (NARCAN) 4 MG/0.1ML nasal spray     No current facility-administered medications for this visit.         Allergies   Allergen Reactions     Bee Venom Shortness Of Breath, Palpitations, Anaphylaxis and Difficulty breathing     Patient does carry Epi-Pen     Chicken-Derived Products (Egg) Nausea and Diarrhea     Compazine [Prochlorperazine]      Extreme jittery     Food      Milk     Gabapentin      Gluten Meal GI Disturbance     Wheat bran and wheat     Pregabalin      Seasonal Allergies      Dust, mold     Topiramate      [place] Prescription Monitoring Program:   Reviewed. No concerns       Assessment and Plan  Chronic pain syndrome  Failed back surgery syndrome  Intractable migraine  Tom-Danlos syndrome  Dysautonomia  Gastroparesis  Urinary urgency and incontinence  Chronic, continuous use of opioid  Intractable chronic migraine without aura and without status migrainosus  Spontaneous intracranial hypotension status post epidural blood patch    1.  Refill tramadol 50 mg p.o. every 6 hours as needed.   #60  2.  Trial Voltaren gel 1% 4 times daily to painful neck area    Follow-up 8 to 12 weeks or sooner as needed.      Video-Visit Details    Type of service:  Video Visit    Video Start Time: 1343  Video End Time: 1356    Originating Location (pt. Location): Home    Distant Location (provider location):  [facility]     Mode of Communication:  Video Conference via [facility]      Lisbeth Silver PA-C

## 2020-04-24 NOTE — TELEPHONE ENCOUNTER
Pt scheduled for virtual visit with Lisbeth Silver for [facility] med check, and refills will be addressed at the appointment.     Jessica De Souza RN, BSN

## 2020-04-24 NOTE — NURSING NOTE
Chief Complaint   Patient presents with     Recheck Medication     Pt would like to recheck medications      LINDA Castillo at 10:40 AM sign on 4/24/2020

## 2020-04-24 NOTE — PROGRESS NOTES
"Ada Welch is a 49 year old female who is being evaluated via a billable video visit.      The patient has been notified of following:     \"This video visit will be conducted via a call between you and your physician/provider. We have found that certain health care needs can be provided without the need for an in-person physical exam.  This service lets us provide the care you need with a video conversation.  If a prescription is necessary we can send it directly to your pharmacy.  If lab work is needed we can place an order for that and you can then stop by our lab to have the test done at a later time.    Video visits are billed at different rates depending on your insurance coverage.  Please reach out to your insurance provider with any questions.    If during the course of the call the physician/provider feels a video visit is not appropriate, you will not be charged for this service.\"    Patient has given verbal consent for Video visit? Yes    How would you like to obtain your AVS? MyChart    Patient would like the video invitation sent by: text      Subjective     Ada Welch is a 49 year old female who presents to clinic today for the following health issues:    Chief Complaint   Patient presents with     Recheck Medication     Pt would like to recheck medications        HPI     Patient with complex medical history (see below) is s/p Nissen 2/20. She no longer has PEG tube. Having some issues with nausea and post-prandial pain. She has lost a little bit of weight--20 lbs. Eating but eating less. Surgery has improved symptoms of gas, belching, pressure feeling.  She is overall satisfied. Not concerned about weight loss because was overweight.    Was recently having headaches on left side, near jaw and side of head. No fevers. Did take antibiotics in March with some improvement (augmentin).  Spoke with her Neurologist. Needs to delay blood patch given COVID-19. Worse upright.  Some tooth pain which " is now improved after abx. No chewing pain anymore.  No more ear pain.  Does have nasal drip. Uses fioricet,maxalt, aleve, tizanidine, ubrelvy (waiting approval).  She has POTS and takes ivabradine with good effect for tachycardia.  Takes butran for leg pain, tramadol for pain flares and medical cannabis, yellow during day and red at night tabs. She has seen Dr. Moncada and Dr. Sofia in neurosugery for Chiari but may get an opinion from .  Overall, no major changes or decompensation in health issues.     Detailed Medical History:  -EDS-Type 3 Hypermobility. 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 He reports having a Beighton score of 7-8 out of 9. She reports having genetic testing done, which was questionable for the MYLK Gene. She previously followed with a rheumatologist who is an EDS specialist in [place].  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 Russell, has ILR. Ivadrabine and propronolol helping.  -Cervical instability, chiari malformation:  Saw Dr. Moncada, cervical fusion not recommended, considering occipital blocks. Also traveled to [facility] to see Dr. Andrey Sofia.  -Chronic headaches: Daily tension, cervicogenic, migraine. Has not responded well to injections. Hospitalized in 2017 with normal LP and MRI. At that time responded to DHA.  Had blood patch done 3/19 with good relief of pain. Previously failed amovig and emgality. Blood patch test repeated 9/19. Now managing with medical management.  -Hiatal hernia: Recently saw Dr. Mack and had manometry testing done in Feb 2019 which revealed lower esoph pressure, hiatal hernia, possibly short esophagus, peristalsis was preserved. Considering laparoscopic repair.  -Eosinophilic esophagitis: EGD 12/18 performed for food impaction showed ring at GEJ and mucosal changes c/w EoE. Biopsies 8/18 positive for Eos.  -Lumbar pain: She underwent L4 to S1 fusion in 2013 followed by removal of instrumentation, which did not improve  her symptoms. She met with Dr. Martinez in the pain clinic and was taken off high doses of Dilaudid and started on Suboxone in 2017, which dramatically improved her symptoms.  She continues to work with physical therapy twice a week as well as other complementary therapies.   -Chronic Pain: On suboxone per above, as well as occasional oxycodone and ultram. Uses medical cannabis since 2018 which helps.  -IBS, constipation: GES showed rapid emptying 10/17. XR video swallow showed no aspiration 10/17.  -Asthma  -Enlarged cervical lymph nodes: L level 2 node biopsied 10/18 with scant cellularity. Biopsied in past and benign with neg flow cytometry.  -Urinary urge incontinence/incomplete emptying  -Pelvic floor dysfunction, rectocele           Routine Health Maintenence:  Depression Screening:   PHQ-2 ( 1999 Pfizer) 2/14/2019 11/8/2018   Q1: Little interest or pleasure in doing things 0 0   Q2: Feeling down, depressed or hopeless 0 0   PHQ-2 Score 0 0   Q1: Little interest or pleasure in doing things Not at all Not at all   Q2: Feeling down, depressed or hopeless Not at all Not at all   PHQ-2 Score 0 0      Immunizations (zoster, pneumovax, flu, Tdap, Hep A/B):   There is no immunization history on file for this patient.  Lipids:         Recent Labs   Lab Test 04/26/18  1010   CHOL 244*   HDL 48*   *   TRIG 156*      Lung Ca Screening (>30 pk age 55-79 or >20 py age 50-79 + RF): n/a low pack years  Colonoscopy (50-75 yrs): 3/17   - One small hyperplastic polyp in the sigmoid colon, removed with a cold snare. Resected and retrieved. Diverticulosis in the sigmoid colon. Repeat colonoscopy in10 years for surveillance   Dexa (>65W or 70M yrs): n/a  Mammogram (40-75 yrs): 1/18 ordered, due  Pap (21-65 yrs): 1/18 HPV neg  HIV/HCV if risk factors:  HIV neg 4/18  Tob/EtOH: screen neg  Lifestyle factors: reviewed  Advanced Directive: deferred       Patient Active Problem List   Diagnosis     Pain syndrome, chronic     s/p  TLIF L4-5,L5-S1 with solid arthrodesis     Tom-Danlos syndrome     Postlaminectomy syndrome, lumbar region     Headache     Cervicalgia     Urinary urgency     Nocturia     Acquired hypothyroidism     Allergic rhinitis     Anxiety     Anxiety disorder     Astigmatism     Chronic sinusitis with recurrent bronchitis     Arthritis of facet joints at multiple vertebral levels     Arthritis involving multiple sites     Opioid dependence (H)     Cyst of fallopian tube     DNS (deviated nasal septum)     Dysuria     Easy bruising     Encounter for genetic counseling and testing     Essential hypertension with goal blood pressure less than 140/90     Head and neck lymphadenopathy     Hepatomegaly     Keratoconjunctivitis sicca (H)     Low back pain radiating to both legs     Liver lesion     Low back pain     Low back pain of multiple sites of spine with sciatica     Lumbar degenerative disc disease     Lumbar radiculopathy     Lymphadenopathy of left cervical region     Intractable chronic migraine without aura and without status migrainosus     Nasal turbinate hypertrophy     Obese     Opioid type dependence, continuous (H)     Other acute postoperative pain     Pain disorder associated with psychological and physical factors     Presbyopia     Spondylolisthesis of lumbar region     Sinus tachycardia     Uterine endometriosis     Autonomic dysfunction     POTS (postural orthostatic tachycardia syndrome)     s/p hiatal hernia repair, nissen, PEG, 2/10     Hiatal hernia     Past Surgical History:   Procedure Laterality Date     AS REPAIR OF NASAL SEPTUM  2009    repair broke nose     BACK SURGERY  12/09/2013  10/01/2014    Spinal fusion L4-S1, L5 moving 5/8ths in. Remove screws/rods     BIOPSY  01/01/2008 & 3/2017    mole biopsy, lymph node biopsy     COLONOSCOPY  3/2017    Colonoscopy and GI     ESOPHAGEAL IMPEDENCE FUNCTION TEST WITH 24 HOUR PH GREATER THAN 1 HOUR N/A 9/17/2019    Procedure: IMPEDANCE PH STUDY,  ESOPHAGUS, 24 HOUR;  Surgeon: Raghavendra Grande MD;  Location: [facility]     ESOPHAGOSCOPY, GASTROSCOPY, DUODENOSCOPY (EGD), COMBINED N/A 8/3/2018    Procedure: COMBINED ESOPHAGOSCOPY, GASTROSCOPY, DUODENOSCOPY (EGD), BIOPSY SINGLE OR MULTIPLE;;  Surgeon: Juan Woodson MD;  Location: [facility]     ESOPHAGOSCOPY, GASTROSCOPY, DUODENOSCOPY (EGD), COMBINED N/A 10/22/2018    Procedure: COMBINED ESOPHAGOSCOPY, GASTROSCOPY, DUODENOSCOPY (EGD), BIOPSY SINGLE OR MULTIPLE;  Surgeon: Sadia Carolina MD;  Location: [facility]     ESOPHAGOSCOPY, GASTROSCOPY, DUODENOSCOPY (EGD), COMBINED N/A 2018    Procedure: COMBINED ESOPHAGOSCOPY, GASTROSCOPY, DUODENOSCOPY (EGD);  Surgeon: Juan Woodson MD;  [facility]     LAPAROSCOPIC HERNIORRHAPHY HIATAL N/A 2/10/2020    Procedure: LAPAROSCOPIC HIATAL HERNIA REPAIR, NISSEN FUNDOPLICATION, ESOPHOGASTRODUODENOSCOPY, PEG TUBE PLACEMENT.;  Surgeon: Alana Mack MD;  Location: [facility]     NASAL/SINUS POLYPECTOMY  2017    Polyps were benign     NOSE SURGERY  2007    deviated septum     TONSILLECTOMY & ADENOIDECTOMY  1981     TUBAL LIGATION  2011       Social History     Tobacco Use     Smoking status: Former Smoker     Packs/day: 0.20     Years: 10.00     Pack years: 2.00     Types: Cigarettes     Start date: 1991     Last attempt to quit: 2013     Years since quittin.4     Smokeless tobacco: Never Used   Substance Use Topics     Alcohol use: Yes     Comment: occasional - 4 times a year      Family History   Problem Relation Age of Onset     Connective Tissue Disorder Mother         eds     Connective Tissue Disorder Sister         eds     Cancer Father         Spinal tumor grew into spinal cord, went in brain     Migraines Father      Diabetes Maternal Grandmother         Elderly diabetes     Heart Disease Maternal Grandmother      Hypertension Maternal Grandmother      Diabetes Paternal Grandmother         Elderly diabetes     Diabetes Paternal Grandfather          Elderly diabetes     Asthma Sister         Pediatric Asthma     Migraines Sister      Asthma Son         Pediatric Asthma     Thyroid Disease Sister         ,     Migraines Sister      Migraines Sister      Breast Cancer No family hx of          Current Outpatient Medications   Medication Sig Dispense Refill     albuterol (PROAIR HFA/PROVENTIL HFA/VENTOLIN HFA) 108 (90 BASE) MCG/ACT Inhaler Inhale 2 puffs into the lungs every 6 hours as needed        amitriptyline (ELAVIL) 10 MG tablet Take 1 tablet (10 mg) by mouth At Bedtime (Patient taking differently: Take 10 mg by mouth nightly as needed for sleep ) 60 tablet 1     buprenorphine (BUTRANS) 10 MCG/HR WK patch Place 1 patch onto the skin once a week 4 patch 1     butalbital-acetaminophen-caffeine (ESGIC) -40 MG tablet TAKE ONE TABLET BY MOUTH EVERY 4 HOURS AS NEEDED. MAX 10 PER MONTH. 10 tablet 0     cetirizine (ZYRTEC) 10 MG tablet Take 10 mg by mouth daily as needed        DULoxetine (CYMBALTA) 30 MG capsule Take 60mg in the morning and 30mg at bedtime 270 capsule 3     EPINEPHrine (EPIPEN/ADRENACLICK/OR ANY BX GENERIC EQUIV) 0.3 MG/0.3ML injection 2-pack INJECT 0.3 MLS (0.3 MG) INTO THE MUSCLE AS NEEDED FOR ANAPHYLAXIS  1     famotidine (PEPCID) 20 MG tablet Take 1 tablet (20 mg) by mouth At Bedtime 90 tablet 3     ferrous fumarate 65 mg, [place]. FE,-Vitamin C 125 mg (VITRON-C)  MG TABS tablet Take 1 tablet by mouth every other day 60 tablet 1     fluticasone (FLONASE) 50 MCG/ACT nasal spray INSTILL 2 SPRAYS INTO BOTH NOSTRILS DAILY 48 mL 3     ivabradine (CORLANOR) 7.5 MG tablet Take 1 tablet (7.5 mg) by mouth 2 times daily (with meals) 90 tablet 3     levothyroxine (SYNTHROID/LEVOTHROID) 25 MCG tablet Take 1 tablet (25 mcg) by mouth daily 90 tablet 2     Lidocaine (LIDOCARE) 4 % Patch Place 1-3 patches onto the skin every 24 hours To prevent lidocaine toxicity, patient should be patch free for 12 hrs daily. 20 patch 0     medical cannabis (Patient's  own supply.  Not a prescription) Take 1 Dose by mouth See Admin Instructions (This is NOT a prescription, and does not certify that the patient has a qualifying medical condition for medical cannabis.  The purpose of this order is  to document that the patient reports taking medical cannabis.)     Capsule formulation - uses as needed (currently out of this and not using as of January 28, 2020)       metoclopramide (REGLAN) 10 MG tablet Take 1 tablet (10 mg) by mouth 4 times daily as needed (headache) 40 tablet 3     Multiple Vitamins-Minerals (MULTIVITAMIN PO) Take 1 tablet by mouth daily        naproxen sodium (ANAPROX) 220 MG tablet Take 440-660 mg by mouth daily as needed for moderate pain       omeprazole (PRILOSEC) 40 MG DR capsule Take 1 capsule (40 mg) by mouth 2 times daily as needed (prn) 60 capsule 1     ondansetron (ZOFRAN-ODT) 4 MG ODT tab Take 1-2 tablets (4-8 mg) by mouth every 12 hours as needed for nausea 60 tablet 1     Probiotic Product (PROBIOTIC DAILY PO) Take 2 packets by mouth every morning        propranolol ER (INDERAL LA) 120 MG 24 hr capsule Take 1 capsule (120 mg) by mouth daily 30 capsule 11     rizatriptan (MAXALT-MLT) 5 MG ODT TAKE 1-2 TABLETS (5-10 MG) BY MOUTH AT ONSET OF HEADACHE FOR MIGRAINE (MAX 30 MG IN 24 HOURS) 18 tablet 11     SUMAtriptan (IMITREX STATDOSE) 6 MG/0.5ML pen injector kit Inject 0.5 ml (6 mg) subcutaneously at onset of migraine, May repeat in 1 hour , Max 12 mg/24 hrs 2 kit 11     tiZANidine (ZANAFLEX) 4 MG tablet Take 1 tablet (4 mg) by mouth 4 times daily as needed for muscle spasms 360 tablet 3     traMADol (ULTRAM) 50 MG tablet        ubrogepant (UBRELVY) 100 MG tablet Take 1 tablet (100 mg) by mouth at onset of headache (headache) 10 tablet 5     vitamin B complex with vitamin C (VITAMIN  B COMPLEX) TABS tablet Take 1 tablet by mouth daily       VITAMIN D, CHOLECALCIFEROL, PO Take 2,000 Units by mouth daily       naloxone (NARCAN) 4 MG/0.1ML nasal spray Spray  "1 spray (4 mg) into one nostril alternating nostrils once as needed for opioid reversal every 2-3 minutes until assistance arrives (Patient not taking: Reported on 2/20/2020) 0.2 mL 0     Allergies   Allergen Reactions     Bee Venom Shortness Of Breath, Palpitations, Anaphylaxis and Difficulty breathing     Patient does carry Epi-Pen     Chicken-Derived Products (Egg) Nausea and Diarrhea     Compazine [Prochlorperazine]      Extreme jittery     Food      Milk     Gabapentin      Gluten Meal GI Disturbance     Wheat bran and wheat     Pregabalin      Seasonal Allergies      Dust, mold     Topiramate        Reviewed and updated as needed this visit by Provider    Review of Systems   ROS COMP:  ROS: 10 point ROS neg other than the symptoms noted above in the HPI.        Objective    There were no vitals taken for this visit.  Estimated body mass index is 28.83 kg/m  as calculated from the following:    Height as of 3/12/20: 1.702 m (5' 7.01\").    Weight as of 3/12/20: 83.5 kg (184 lb 1.6 oz).  Physical Exam   Physical Exam   GENERAL: healthy, alert and no distress  HEAD: Normocephalic, atraumatic  EYES: Eyes grossly normal to inspection, EOMI and conjunctivae and sclerae normal  RESP: Speaking in full sentences, unlabored, no audible wheezes or cough  SKIN: no suspicious lesions or rashes, no jaundice  NEURO: oriented, and speech normal  PSYCH: mentation appears normal, affect normal/bright        Diagnostic Test Results:  none         Ada was seen today for recheck medication.    Diagnoses and all orders for this visit:    Hiatal hernia  Stable post-op check. Will continue to monitor weight and nutrition.    Arnold-Chiari deformity (H)  Pain syndrome, chronic  Will consider seeign Dr. Baltazar in future.  Otherwise follows with Pain Clinic. Has regimen of medical cannabis, tramadol, butran, TCA, cymbalta and tizanidine. No changes today.    Episodic tension-type headache, not intractable  Working with Neurology. Has " medication regimen for symtoms. Awaiting approval of new med.  Will consider blood patch once pandemic abates.    Autonomic dysfunction  Doing wall on ivadrabine.      Video-Visit Details    Type of service:  Video Visit    Video End Time: 11:27 AM 11:49 AM 22 min    Originating Location (pt. Location): Home    Distant Location (provider location):  [facility]     Mode of Communication:  Video Conference via PocketMobile    No follow-ups on file.       Ellyn Rivera MD  Internal Medicine

## 2020-04-29 NOTE — PATIENT INSTRUCTIONS
Medications:    Tramadol 50 mg; Take 1 tablet every 6 hours as needed.     Voltaren gel 1% - 4 times daily; place onto painful neck area.     When calling in for refills for your medication- You MUST call (Or MyChart) the clinic DIRECTLY and at least 7 days before you are needing your medication refilled.      Recommended Follow up:  8-12 weeks or sooner if needed.          To speak with a nurse, schedule/reschedule/cancel a clinic appointment, or request a medication refill call: [phone]    You can also reach us by [employer]: [email or web address]    Please provide the clinic with a minium of 1 week notice, on all prescription refills.

## 2020-05-19 ENCOUNTER — VIRTUAL VISIT (OUTPATIENT)
Dept: NEUROLOGY | Facility: CLINIC | Age: 50
End: 2020-05-19
Payer: COMMERCIAL

## 2020-05-19 DIAGNOSIS — M54.2 CERVICALGIA: ICD-10-CM

## 2020-05-19 DIAGNOSIS — R51.0 POSITIONAL HEADACHE: ICD-10-CM

## 2020-05-19 DIAGNOSIS — G43.719 INTRACTABLE CHRONIC MIGRAINE WITHOUT AURA AND WITHOUT STATUS MIGRAINOSUS: Primary | ICD-10-CM

## 2020-05-19 DIAGNOSIS — R20.0 ARM NUMBNESS: ICD-10-CM

## 2020-05-19 NOTE — PROGRESS NOTES
"Ada Welch is a 49 year old female who is being evaluated via a billable video visit.      The patient has been notified of following:     \"This video visit will be conducted via a call between you and your physician/provider. We have found that certain health care needs can be provided without the need for an in-person physical exam.  This service lets us provide the care you need with a video conversation.  If a prescription is necessary we can send it directly to your pharmacy.  If lab work is needed we can place an order for that and you can then stop by our lab to have the test done at a later time.    Video visits are billed at different rates depending on your insurance coverage.  Please reach out to your insurance provider with any questions.    If during the course of the call the physician/provider feels a video visit is not appropriate, you will not be charged for this service.\"    Patient has given verbal consent for Video visit? Yes    How would you like to obtain your AVS? [facility]    Patient would like the video invitation sent by: [phone]    Will anyone else be joining your video visit? No      Video-Visit Details    Type of service:  Video Visit    Video Start Time: 11:05  Video End Time: 11:20 AM    Originating Location (pt. Location): Home    Distant Location (provider location):  [facility]     Platform used for Video Visit: LINDA Estrella      Reason for Visit: Recheck for headaches    HPI:  Ada Welch is a 49-year-old woman with chronic headaches in the setting of a diagnosis of Tom Danlos, autonomic dysfunction, chronic pain syndrome, neck pain with no surgical target.    She has been having positional headaches that are worse when being upright. No CSF leak has been found. Blind blood patches have helped in September but when symptoms flared again in December she had a second blood patch without benefit in January.     She reports a left sided headache, " worse when upright, increased neck pain and pain in the base of her skull bilaterally and a feeling of arm heaviness and weakness. She has been treating with her pain medicine including Fioricet, Tramadol, Ubrelvy, Chronic narcotics without relief. She reports she is miserable and needs help. She has messaged her entire care team seeking help.     Impression/Recommendations  1. Chronic headache and pain with exacerbation x 5 months but worse lately.     I am hesitant to reorder blood patch when she reports the January blood patch did not help and we have no evidence of an actual leak    Repeat MRI brain to assess for low CSF    She has neck pain, reported arm heaviness. Unable to physically exam. We asked her to come in for appt and she declined, not feeling well enough. MRI cervical spine ordered. She is scheduled to se Dr. Martinez in 2 days.    Over 50% time in counseling. No additional medicines to add at this time. I am still concerned she has medication overuse headache in this setting.    Noemy Nguyen MD FAAN  Department of Neurology  Pager [phone]

## 2020-05-21 ENCOUNTER — VIRTUAL VISIT (OUTPATIENT)
Dept: ANESTHESIOLOGY | Facility: CLINIC | Age: 50
End: 2020-05-21
Attending: ANESTHESIOLOGY
Payer: COMMERCIAL

## 2020-05-21 ENCOUNTER — MYC MEDICAL ADVICE (OUTPATIENT)
Dept: ANESTHESIOLOGY | Facility: CLINIC | Age: 50
End: 2020-05-21

## 2020-05-21 DIAGNOSIS — G89.4 CHRONIC PAIN SYNDROME: ICD-10-CM

## 2020-05-21 DIAGNOSIS — M47.812 FACET ARTHROPATHY, CERVICAL: Primary | ICD-10-CM

## 2020-05-21 RX ORDER — TRAMADOL HYDROCHLORIDE 50 MG/1
50 TABLET ORAL EVERY 6 HOURS PRN
Qty: 60 TABLET | Refills: 0 | Status: SHIPPED | OUTPATIENT
Start: 2020-05-21 | End: 2020-10-08

## 2020-05-21 NOTE — PROGRESS NOTES
Verbal orders given by Dr. Martinez to refill pt's tramadol for 60 tablets.     Jessica De Souza, RN, BSN

## 2020-05-21 NOTE — PATIENT INSTRUCTIONS
Medications:    traMADol (ULTRAM) 50 MG tablet- Take 1 tablet (50 mg) by mouth every 6 hours as needed for severe pain.      Procedures:    We will contact you when our procedure center re-opens, and assist you to schedule your recommended procedure.   (Left cervical medial branch nerve block)  -Please call our office if you have additional questions in the mean time.       Recommended Follow up:  We will call you to schedule your procedure once we are cleared to resume injections.           To speak with a nurse, schedule/reschedule/cancel a clinic appointment, or request a medication refill call: [phone]    You can also reach us by [employer]: [email or web address]    Please provide the clinic with a minium of 1 week notice, on all prescription refills.

## 2020-05-21 NOTE — PROGRESS NOTES
"Ada Welch is a 49 year old female who is being evaluated via a billable video visit.      The patient has been notified of following:     \"This video visit will be conducted via a call between you and your physician/provider. We have found that certain health care needs can be provided without the need for an in-person physical exam.  This service lets us provide the care you need with a video conversation.  If a prescription is necessary we can send it directly to your pharmacy.  If lab work is needed we can place an order for that and you can then stop by our lab to have the test done at a later time.    Video visits are billed at different rates depending on your insurance coverage.  Please reach out to your insurance provider with any questions.    If during the course of the call the physician/provider feels a video visit is not appropriate, you will not be charged for this service.\"    Patient has given verbal consent for Video visit? Yes    How would you like to obtain your AVS? MyChart    Patient would like the video invitation sent by: Text to cell phone: [phone]    Will anyone else be joining your video visit? No          Secondary Video Option (Doximity), send text message to:  [phone]    I have reviewed and updated the patient's allergies and medication list.    Concerns: Patient has no new concerns.      Refills: Tramadol       LINDA Zarate            Video-Visit Details    Type of service:  Video Visit    Video Start Time: 9:40  Video End Time: 10:00    Originating Location (pt. Location): Home    Distant Location (provider location):  [facility]     Platform used for Video Visit: [facility]      Assessment and plan    Ada Welch is a 49 year old female who  is well known to me for chronic low back pain and neck pain. The patient has very complex history of low back pain for ~25 years for which she underwent L4-S1 fusion (TLIF) " 12/9/13.  Postoperatively, her back pain worsened (mainly LBP, but also radiated down both lower limbs in an S1 dermatomal distribution).   Underwent removal of posterior instrumentation (screws and guadalupe).  After fusion removal, her pain continued.  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  She even sought care for her EDS by a specialist in [place],  who she saw twice.      She has a h/o significant autonomic dysfunction.  Since 2014, she reports that her heart rate has fluctuated from as low as  beats per minutes and her blood pressure also fluctuates at the same time without any aggravating factors.  The fluctuation of the blood pressure and heart rate happens when she is supine, sitting, standing, walking, etc.  She has seen cardiologist Dr. Russell who advised her to continue fludrocortisone, isometric exercise, low carbohydrate and low gluten diet.     She has been seeing Dr. Singh for urinary urgency and incontinence.  She is advised for improving lifestyle, dietary modification, optimizing bowel regimen and to start pelvic physical therapy.     She has seen Dr. Moncada for left upper extremity radicular pain along the C8 nerve root distribution.  Per Dr. Moncada's note, she does not have a surgical target which may explain her symptoms.  She still continues to report this symptom.  We discussed about epidural steroid injection, however she is reluctant to perform the procedures and states she previously had a bad reaction with a steroid.  Discussed about performing cervical epidural injection without steroid.     The patient has been seeing Dr. Noemy Nguyen for chronic migraines. Patient reports that her chronic intractable headache pain recently got worse. 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  She has undergone blood patches intermittently for her headache pain.  The latest blood patches did not improve her pain.  MRI of the cervical spine and brain were ordered.   Her cervical spine MRI on 11/2/2019 shows straightening of the cervical lordosis, cerebellar tonsils are 5 mm below the foramen magnum, mild stepwise 5 and 6 retrolisthesis and disc space narrowing.  She also has disc degeneration at multiple levels.  At C4-C5 level the patient has right disc bulging with mild bilateral neuroforaminal narrowing also mild left facet arthropathy.  Her MRI findings correlate with her symptoms as she does report neck pain mostly on the left side.  Turning her head to the left increases her pain.     The patient has seen Dr. Mack for eosinophilic esophagitis, GERD, and dysphagia.    She had open hernia surgery by Dr. Mack in February 2020.  Her postoperative course was uneventful.  Currently she has been taking small and frequent meals and lost 45 pounds.     She has been managing her pain with stable dose of Butrans 10 mcg per hour q 7 days, tramadol 50 mg every 6 hours as needed which she refills every 2-3 months.  In addition she takes tizanidine 4 mg 4 times daily as needed.    She takes sumatriptan for breakthrough migraine.  Occasionally she takes oxycodone 5 mg when her pain gets significantly worse.       Plan    1. Medications.   -Continue transdermal Butrans 10 mcg per hour q 7 days .  No medication dispensed today.  -Continue propranolol 120 mg p.o. daily  -Continued Zanaflex 4 mg 4 times daily as needed  -Continue tramadol 50 mg p.o. every 6 hours as needed.  prescription of 60 tablets given today.  No medication dispensed today.  -Amitriptyline 50 mg nightly  -Cymbalta continued 60 mg AM and 30 mg PM  -Maxalt 5-10 mg p.o. at onset of the headache for migraine (max 30 mg in 24 hours)        2. Interventional procedures:  We will schedule her for cervical medial branch nerve block C3-C4-C5.     3. Labs and imaging:   -None needed for pain management.      4. Rehab:    -Not discussed today.     5. Psychology:   -No current needs.      6. Disposition:  The patient will follow up  in our outpatient clinic in 12 weeks or earlier if clinically indicated.       Faiza Martinez MD, PhD    [facility]  [address]  [address]  [place] [address] [phone]  Dept: [phone]

## 2020-05-28 DIAGNOSIS — E03.9 HYPOTHYROIDISM, UNSPECIFIED TYPE: ICD-10-CM

## 2020-05-28 RX ORDER — LEVOTHYROXINE SODIUM 25 UG/1
25 TABLET ORAL DAILY
Qty: 90 TABLET | Refills: 0 | Status: SHIPPED | OUTPATIENT
Start: 2020-05-28 | End: 2020-09-03

## 2020-05-28 NOTE — TELEPHONE ENCOUNTER
levothyroxine (SYNTHROID/LEVOTHROID) 25 MCG tablet   Take 1 tablet (25 mcg) by mouth daily       Last Written Prescription Date:  9/10/19  Last Fill Quantity: 90,   # refills: 2  Last Office Visit : 4/24/20  Future Office visit:  none    90 day marcia to pharmacy.     Routing FYI because:  Overdue lab - TSH

## 2020-05-29 DIAGNOSIS — K20.0 EOSINOPHILIC ESOPHAGITIS: ICD-10-CM

## 2020-05-30 RX ORDER — OMEPRAZOLE 40 MG/1
40 CAPSULE, DELAYED RELEASE ORAL 2 TIMES DAILY PRN
Qty: 60 CAPSULE | Refills: 1 | Status: CANCELLED | OUTPATIENT
Start: 2020-05-30

## 2020-05-30 NOTE — TELEPHONE ENCOUNTER
"  omeprazole (PRILOSEC) 40 MG DR capsule    Last Written Prescription Date:  3/17/20  Last Fill Quantity: 60,   # refills: 1  Last Office Visit :8/8/18  Future Office visit:  None  \" RTC: Return in about 3 months (around 11/8/2018\"    Scheduling has been notified to contact the pt for appointment.      Routing refill request to provider for review/approval because: lv 8/18.  rf?    "

## 2020-06-17 ENCOUNTER — TELEPHONE (OUTPATIENT)
Dept: GASTROENTEROLOGY | Facility: CLINIC | Age: 50
End: 2020-06-17

## 2020-06-22 ENCOUNTER — TELEPHONE (OUTPATIENT)
Dept: ANESTHESIOLOGY | Facility: CLINIC | Age: 50
End: 2020-06-22

## 2020-06-22 NOTE — TELEPHONE ENCOUNTER
[facility] Call Center    Phone Message    May a detailed message be left on voicemail: yes     Reason for Call: Medication Question or concern regarding medication   Prescription Clarification  Name of Medication: Butrans and Tramadol  Prescribing Provider: Dr. Martinez   Pharmacy: [facility]    What on the order needs clarification? Pt stated the pharmacy told her she needs to be seen before getting a refill.  Pt does have an appt with Dr. Martinez July 9th, please call pt in regards to this           Action Taken: Message routed to:  [facility] ([facility]): Pain    Travel Screening: Not Applicable

## 2020-06-25 DIAGNOSIS — M96.1 POSTLAMINECTOMY SYNDROME: ICD-10-CM

## 2020-06-25 DIAGNOSIS — G89.4 CHRONIC PAIN SYNDROME: ICD-10-CM

## 2020-06-25 RX ORDER — BUPRENORPHINE 10 UG/H
1 PATCH TRANSDERMAL WEEKLY
Qty: 4 PATCH | Refills: 0 | Status: SHIPPED | OUTPATIENT
Start: 2020-06-25 | End: 2020-07-31

## 2020-06-25 RX ORDER — TRAMADOL HYDROCHLORIDE 50 MG/1
50 TABLET ORAL EVERY 6 HOURS PRN
Qty: 60 TABLET | Refills: 0 | Status: SHIPPED | OUTPATIENT
Start: 2020-06-25 | End: 2020-07-31

## 2020-06-25 NOTE — TELEPHONE ENCOUNTER
Verbal orders given by Dr. Martinez to refill buprenorphine and tramadol.  checked and no red flags noted.     Jessica De Souza, NITHINN, RN

## 2020-06-29 ENCOUNTER — ANCILLARY PROCEDURE (OUTPATIENT)
Dept: MRI IMAGING | Facility: CLINIC | Age: 50
End: 2020-06-29
Attending: PSYCHIATRY & NEUROLOGY
Payer: COMMERCIAL

## 2020-06-29 ENCOUNTER — VIRTUAL VISIT (OUTPATIENT)
Dept: GASTROENTEROLOGY | Facility: CLINIC | Age: 50
End: 2020-06-29
Payer: COMMERCIAL

## 2020-06-29 DIAGNOSIS — K59.04 CHRONIC IDIOPATHIC CONSTIPATION: Primary | ICD-10-CM

## 2020-06-29 DIAGNOSIS — M54.2 CERVICALGIA: ICD-10-CM

## 2020-06-29 DIAGNOSIS — R20.0 ARM NUMBNESS: ICD-10-CM

## 2020-06-29 DIAGNOSIS — R51.0 POSITIONAL HEADACHE: ICD-10-CM

## 2020-06-29 RX ORDER — GADOBUTROL 604.72 MG/ML
7.5 INJECTION INTRAVENOUS ONCE
Status: COMPLETED | OUTPATIENT
Start: 2020-06-29 | End: 2020-06-29

## 2020-06-29 RX ADMIN — GADOBUTROL 7.5 ML: 604.72 INJECTION INTRAVENOUS at 14:32

## 2020-06-29 NOTE — LETTER
6/29/2020     RE: Ada Welch  [address]  [place] [address]    Dear Colleague,    Thank you for referring your patient, Ada Welch, to the [facility] IBD [facility]. Please see a copy of my visit note below.    Ada Welch is a 49 year old female who is being evaluated via a billable video visit.      Video-Visit Details  Type of service:  Video Visit  Video Start Time: 9:40 AM  Video End Time: 1020  Originating Location (pt. Location): Home  Distant Location (provider location):  [facility]   Platform used for Video Visit: Sobeida Xiong PA-C    GI CLINIC VISIT    CC/REFERRING MD:  Jerad Hebert  REASON FOR CONSULTATION:   Jerad Hebert for   Chief Complaint   Patient presents with     RECHECK     follow up gerd       ASSESSMENT/PLAN:  Ms. 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 Of note, she was previously seen for dysphagia and esophageal eosinophilia by Dr. Roland, who thought that the findings may be reflux-related in the setting of a hiatal hernia. EGD that was repeated after she completed an 8 week trial of high dose PPI showed resolution of eosinophils.  She is now s/p nissen fundoplication 2/2020.    Regarding her constipation, the Linzess had worked well for her allowing for daily BM, we will restart this.  She may also be having an element of gas-bloat with her recent nissen and may require lifestyle modifications based on thi surgery.  Her PFC evaluation was s/f rectal intussusception. She plans to undergo biofeedback therapy and may need surgical correction in the future.     # Esophageal eosinophilia  -- Resolved with high dose PPI.  We will need to evaluate if high dose PPI is necessary moving forward now that she had had a nissen.  I  "think it may be best to see our esophageal specialist to determine if ongoing medical therapy would be necessary.  She will continue with omeprazole 40 mg BID and pepcid 20 mg at night for now.     # Rectal intussusception   # Pelvic floor dysfunction  # Tom-Danlos syndroe  -- Restart Linzess 145 mcg daily   -- Continue to pursue Biofeedback therapy through the [facility]. Continue to work with [facility] regarding possible need for surgical repair.     RTC: Return in about 3 months (around 9/29/2020).       Stephen Xiong PA-C  Division of Gastroenterology, Hepatology and Nutrition  [facility]  Ms. 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  Patient last seen in 2018 with Dr. Griffith.  At the time of consultation, she was in the process of work up for mastocytosis (urine showed elevated histamine levels, but patient was on antacid therapy and Mobic at that time). PMHx also includes 5 vaginal deliveries, migraines, cervicalgia, chronic pain syndrome, degenerative joint disease, chronic pan sinusitis.  She had been referred to the GI clinic for evaluation of possible IBS with mixed stools.     At consultation:  She has had lifelong symptoms of abdominal distension, bloating and borborygmi shortly after eating.  Stools alternates between loose stools and constipation. Usually, she has constipation in the form of hard stools and  incomplete evacuation. Performs splinting (applies posterior vaginal wall pressure with fingers) to help evacuate stool. Had \"pelvic floor releases\" with PT in [place], [place] and \"[facility]\" in [place], [place] with some help. Period of constipation will last for 1 month; during this time, will have 1 BM weekly with Miralax. Endorses lower abdominal pain that worsens with constipation.  Previously failed Miralax, senna, dulcolax, fiber, suppositories.  Period of constipation is then followed by several days of loose stools. Ms. Welch endorses " occasional BRB with straining in the toilet and TP x 20 years. Cscope 3/2017 showed small mouthed diverticulosis in the sigmoid and 1 hyperplastic polyp; repeat cscope in 10 years was the recommendation. Uses buprenorphine patch (opioid) x 3 years. She saw [facility]FC with recommendation for biofeedback therapy. Rectal intussusception was noted and will likely need surgical repair in the future. Linzess had been very helpful with constipation and bloating and allowed for her to have a daily BM, however she did not return to the clinic to have it refilled and discontinued it.     She had been taking 20 mg prilosec during the day and pepcid at night with adequate heartburn control at consultation.    At her last follow up visit, she developed dysphagia.  This was assessed with EGD that was concerning for EoE grossly. She had been on prilosec 20mg + Pepcid prior to the scope. Had not olga on twice daily PPI prior to scope. Per Dr. Roland's assessment in 2017, the thought was that the eosinophilia (previously seen on prior endoscopy) may be 2/2 reflux in the setting of a hiatal hernia. She continued to have dysphagia to primarily meat, liquids were ok.  She had manometry that was abnormal with weak lower esophageal pressure, hiatal hernia, possible short esophagus though stature of patient needs to be considered. peristalisis preserved. She then had hernia repair, nissen and PEG 2/10/20.    Over all symptoms went well, did not need to use PEG tube and was removed. Still has some issues with solids (mostly meats but can be breads, and need to drink quite a bit in order to help prevent getting stuck), liquids are ok unless she drinks too fast. No vomiting. No heartburn. She continues to take omeprazole 40 mg BID and pepcid at night.      Every time she eats she experiences abdominal discomfort with cramping that last for hours.  Constantly nauseated. She is not on a bowel regimen (previously had been on linzess with good success).   Over the last couple weeks she has had a BM daily to every other day, however before this time she would go several days without a BM then have several stools in a row.  No blood in the stool.  She started an antiinflammatory diet which for her consists primarily of fruits, vegetables and meats.    She continues to use cannabis nightly for sleep. She takes Reglan BID (prescribed by neurologist and PCP, we discussed possible side effects today and danger of long term use, although none present now), takes naproxen once a week and continues to use buprenorphine pain pain weekly.     ROS:    No fevers or chills  No weight loss  No blurry vision, double vision or change in vision  No sore throat  No lymphadenopathy  + chronic migraines   No shortness of breath or wheezing  No chest pain or pressure  No rashes or skin changes  + dysphagia (meats on occasion)  No melena  No dysuria, frequency or urgency  No hot/cold intolerance or polyria  + anxiety or depression    PROBLEM LIST  Patient Active Problem List    Diagnosis Date Noted     Hiatal hernia 02/10/2020     Priority: Medium     s/p hiatal hernia repair, nissen, PEG, 2/10 01/22/2020     Priority: Medium     Added automatically from request for surgery [id number]       POTS (postural orthostatic tachycardia syndrome) 10/23/2019     Priority: Medium     Autonomic dysfunction 06/07/2018     Priority: Medium     Low back pain of multiple sites of spine with sciatica 12/09/2017     Priority: Medium     Intractable chronic migraine without aura and without status migrainosus 12/09/2017     Priority: Medium     Obese 12/09/2017     Priority: Medium     Urinary urgency 08/30/2017     Priority: Medium     Nocturia 08/30/2017     Priority: Medium     Cervicalgia 08/25/2017     Priority: Medium     Headache 07/25/2017     Priority: Medium     Postlaminectomy syndrome, lumbar region 06/14/2017     Priority: Medium     Lymphadenopathy of left cervical region 03/27/2017     Priority:  Medium     Easy bruising 10/13/2016     Priority: Medium     Head and neck lymphadenopathy 10/13/2016     Priority: Medium     Hepatomegaly 10/13/2016     Priority: Medium     Liver lesion 10/13/2016     Priority: Medium     Pain syndrome, chronic 09/05/2016     Priority: Medium     s/p TLIF L4-5,L5-S1 with solid arthrodesis 09/05/2016     Priority: Medium     Tom-Danlos syndrome 09/05/2016     Priority: Medium     Acquired hypothyroidism 07/22/2016     Priority: Medium     Anxiety 07/22/2016     Priority: Medium     Opioid dependence (H) 07/22/2016     Priority: Medium     Overview:   Treatment Agreement       Essential hypertension with goal blood pressure less than 140/90 07/22/2016     Priority: Medium     Low back pain 07/12/2016     Priority: Medium     Lumbar radiculopathy 02/18/2016     Priority: Medium     Encounter for genetic counseling and testing 01/05/2016     Priority: Medium     Overview:     [facility] Aortopathy Comprehensive Panel ordered 1/5/2016.       Sinus tachycardia 09/29/2015     Priority: Medium     Astigmatism 12/12/2014     Priority: Medium     Keratoconjunctivitis sicca (H) 12/12/2014     Priority: Medium     Presbyopia 12/12/2014     Priority: Medium     Opioid type dependence, continuous (H) 10/09/2014     Priority: Medium     Other acute postoperative pain 10/09/2014     Priority: Medium     Anxiety disorder 08/21/2014     Priority: Medium     Pain disorder associated with psychological and physical factors 08/21/2014     Priority: Medium     Low back pain radiating to both legs 07/08/2014     Priority: Medium     Arthritis of facet joints at multiple vertebral levels 01/01/2014     Priority: Medium     Spondylolisthesis of lumbar region 12/10/2013     Priority: Medium     Lumbar degenerative disc disease 11/21/2013     Priority: Medium     DNS (deviated nasal septum) 10/09/2012     Priority: Medium     Nasal turbinate hypertrophy 10/09/2012     Priority: Medium     Dysuria  07/12/2011     Priority: Medium     Chronic sinusitis with recurrent bronchitis 02/01/2007     Priority: Medium     Allergic rhinitis 01/09/2007     Priority: Medium     Cyst of fallopian tube 12/28/1996     Priority: Medium     Arthritis involving multiple sites 01/01/1986     Priority: Medium     Uterine endometriosis 01/01/1986     Priority: Medium       PERTINENT PAST MEDICAL HISTORY:  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     Cervicalgia      Chronic sinusitis 00/2007    Diagnosed with chronic pansinusitis 2016     CSF leak     Chiari malformation     Depression      Tom-Danlos disease      Eosinophilic esophagitis 08/2018     Gastroesophageal reflux disease 3/1/2017    I have large hiatal hernia     Hiatal hernia 2016    Have adeline hiatel hernia     Hoarseness Unsure    It comes and goes     Hypothyroid      IBS (irritable bowel syndrome) with constipation     improved on Linzess     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     Migraines 1/1/2007    Unsure of exact date     Nasal polyps 2013    Were revoved 03/2017, benign     Orthostatic hypotension      Other nervous system complications 1/2014    Autonomic nervous system disorder, neuralgia, neuropathy     Swelling, mass, or lump in head and neck 08/2016    Lymph node has been growing for last year     Thyroid disease 01/01/2008     Urinary incontinence      Urinary urgency    Was told she had an ulcer at age 15 in the setting of life stress. Not sure if she had an EGD at that time.       PREVIOUS SURGERIES:  Past Surgical History:   Procedure Laterality Date     AS REPAIR OF NASAL SEPTUM  2009    repair broke nose     BACK  SURGERY  12/09/2013  10/01/2014    Spinal fusion L4-S1, L5 moving 5/8ths in. Remove screws/rods     BIOPSY  01/01/2008 & 3/2017    mole biopsy, lymph node biopsy     COLONOSCOPY  3/2017    Colonoscopy and GI     ESOPHAGEAL IMPEDENCE FUNCTION TEST WITH 24 HOUR PH GREATER THAN 1 HOUR N/A 9/17/2019    Procedure: IMPEDANCE PH STUDY, ESOPHAGUS, 24 HOUR;  Surgeon: Raghavendra Grande MD;  Location:  GI     ESOPHAGOSCOPY, GASTROSCOPY, DUODENOSCOPY (EGD), COMBINED N/A 8/3/2018    Procedure: COMBINED ESOPHAGOSCOPY, GASTROSCOPY, DUODENOSCOPY (EGD), BIOPSY SINGLE OR MULTIPLE;;  Surgeon: Juan Woodson MD;  Location:  GI     ESOPHAGOSCOPY, GASTROSCOPY, DUODENOSCOPY (EGD), COMBINED N/A 10/22/2018    Procedure: COMBINED ESOPHAGOSCOPY, GASTROSCOPY, DUODENOSCOPY (EGD), BIOPSY SINGLE OR MULTIPLE;  Surgeon: Sadia Carolina MD;  Location:  GI     ESOPHAGOSCOPY, GASTROSCOPY, DUODENOSCOPY (EGD), COMBINED N/A 12/17/2018    Procedure: COMBINED ESOPHAGOSCOPY, GASTROSCOPY, DUODENOSCOPY (EGD);  Surgeon: Juan Woodson MD;  Location: [facility]     LAPAROSCOPIC HERNIORRHAPHY HIATAL N/A 2/10/2020    Procedure: LAPAROSCOPIC HIATAL HERNIA REPAIR, NISSEN FUNDOPLICATION, ESOPHOGASTRODUODENOSCOPY, PEG TUBE PLACEMENT.;  Surgeon: Alana Mack MD;  Location:  OR     NASAL/SINUS POLYPECTOMY  2017    Polyps were benign     NOSE SURGERY  2007    deviated septum     TONSILLECTOMY & ADENOIDECTOMY  1981     TUBAL LIGATION  07/01/2011       PREVIOUS ENDOSCOPY:  EGD 8/2017: findings s/f EoE, 3 cm HH. Path showed 40 eos/HPF in distal and mid esophagus. Mild acute inflammation and occasional eos.   EGD 3/2017: EGD with ringed esophagus with distal bx showing increased eos, min eos in mid esophagus.   cscope 3/2017: small-mouthed, mild diverticulosis, 1 small hyperplastic polyp. Recommended to repeat cscope in 10 years (given path).     ALLERGIES:     Allergies   Allergen Reactions     Bee Venom Shortness Of Breath, Palpitations, Anaphylaxis and  Difficulty breathing     Patient does carry Epi-Pen     Chicken-Derived Products (Egg) Nausea and Diarrhea     Compazine [Prochlorperazine]      Extreme jittery     Food      Milk     Gabapentin      Gluten Meal GI Disturbance     Wheat bran and wheat     Pregabalin      Seasonal Allergies      Dust, mold     Topiramate        PERTINENT MEDICATIONS:    Current Outpatient Medications:      albuterol (PROAIR HFA/PROVENTIL HFA/VENTOLIN HFA) 108 (90 BASE) MCG/ACT Inhaler, Inhale 2 puffs into the lungs every 6 hours as needed , Disp: , Rfl:      amitriptyline (ELAVIL) 10 MG tablet, Take 1 tablet (10 mg) by mouth At Bedtime, Disp: 60 tablet, Rfl: 1     buprenorphine (BUTRANS) 10 MCG/HR WK patch, Place 1 patch onto the skin once a week, Disp: 4 patch, Rfl: 0     butalbital-acetaminophen-caffeine (ESGIC) -40 MG tablet, TAKE ONE TABLET BY MOUTH EVERY 4 HOURS AS NEEDED. MAX 10 PER MONTH., Disp: 10 tablet, Rfl: 0     cetirizine (ZYRTEC) 10 MG tablet, Take 10 mg by mouth daily as needed , Disp: , Rfl:      diclofenac (VOLTAREN) 1 % topical gel, Place 2 g onto the skin 4 times daily, Disp: 1 Tube, Rfl: 0     DULoxetine (CYMBALTA) 30 MG capsule, Take 60mg in the morning and 30mg at bedtime, Disp: 270 capsule, Rfl: 3     EPINEPHrine (EPIPEN/ADRENACLICK/OR ANY BX GENERIC EQUIV) 0.3 MG/0.3ML injection 2-pack, INJECT 0.3 MLS (0.3 MG) INTO THE MUSCLE AS NEEDED FOR ANAPHYLAXIS, Disp: , Rfl: 1     famotidine (PEPCID) 20 MG tablet, Take 1 tablet (20 mg) by mouth At Bedtime, Disp: 90 tablet, Rfl: 3     ferrous fumarate 65 mg, [place]. FE,-Vitamin C 125 mg (VITRON-C)  MG TABS tablet, Take 1 tablet by mouth every other day, Disp: 60 tablet, Rfl: 1     fluticasone (FLONASE) 50 MCG/ACT nasal spray, INSTILL 2 SPRAYS INTO BOTH NOSTRILS DAILY, Disp: 48 mL, Rfl: 3     ivabradine (CORLANOR) 7.5 MG tablet, Take 1 tablet (7.5 mg) by mouth 2 times daily (with meals), Disp: 180 tablet, Rfl: 3     levothyroxine (SYNTHROID/LEVOTHROID) 25 MCG  tablet, Take 1 tablet (25 mcg) by mouth daily, Disp: 90 tablet, Rfl: 0     Lidocaine (LIDOCARE) 4 % Patch, Place 1-3 patches onto the skin every 24 hours To prevent lidocaine toxicity, patient should be patch free for 12 hrs daily., Disp: 20 patch, Rfl: 0     linaclotide (LINZESS) 145 MCG capsule, Take 1 capsule (145 mcg) by mouth every morning (before breakfast), Disp: 90 capsule, Rfl: 3     medical cannabis (Patient's own supply.  Not a prescription), Take 1 Dose by mouth See Admin Instructions (This is NOT a prescription, and does not certify that the patient has a qualifying medical condition for medical cannabis.  The purpose of this order is  to document that the patient reports taking medical cannabis.)   Capsule formulation - uses as needed (currently out of this and not using as of January 28, 2020), Disp: , Rfl:      metoclopramide (REGLAN) 10 MG tablet, Take 1 tablet (10 mg) by mouth 4 times daily as needed (headache), Disp: 40 tablet, Rfl: 3     Multiple Vitamins-Minerals (MULTIVITAMIN PO), Take 1 tablet by mouth daily , Disp: , Rfl:      naloxone (NARCAN) 4 MG/0.1ML nasal spray, Spray 1 spray (4 mg) into one nostril alternating nostrils once as needed for opioid reversal every 2-3 minutes until assistance arrives, Disp: 0.2 mL, Rfl: 0     naproxen sodium (ANAPROX) 220 MG tablet, Take 440-660 mg by mouth daily as needed for moderate pain, Disp: , Rfl:      omeprazole (PRILOSEC) 40 MG DR capsule, Take 1 capsule (40 mg) by mouth 2 times daily as needed (prn), Disp: 60 capsule, Rfl: 1     ondansetron (ZOFRAN-ODT) 4 MG ODT tab, Take 1-2 tablets (4-8 mg) by mouth every 12 hours as needed for nausea, Disp: 60 tablet, Rfl: 1     Probiotic Product (PROBIOTIC DAILY PO), Take 2 packets by mouth every morning , Disp: , Rfl:      propranolol ER (INDERAL LA) 120 MG 24 hr capsule, Take 1 capsule (120 mg) by mouth daily, Disp: 30 capsule, Rfl: 11     rizatriptan (MAXALT-MLT) 5 MG ODT, TAKE 1-2 TABLETS (5-10 MG) BY  MOUTH AT ONSET OF HEADACHE FOR MIGRAINE (MAX 30 MG IN 24 HOURS), Disp: 18 tablet, Rfl: 11     SUMAtriptan (IMITREX STATDOSE) 6 MG/0.5ML pen injector kit, Inject 0.5 ml (6 mg) subcutaneously at onset of migraine, May repeat in 1 hour , Max 12 mg/24 hrs, Disp: 2 kit, Rfl: 11     tiZANidine (ZANAFLEX) 4 MG tablet, Take 1 tablet (4 mg) by mouth 4 times daily as needed for muscle spasms, Disp: 360 tablet, Rfl: 3     traMADol (ULTRAM) 50 MG tablet, Take 1 tablet (50 mg) by mouth every 6 hours as needed for severe pain, Disp: 60 tablet, Rfl: 0     traMADol (ULTRAM) 50 MG tablet, Take 1 tablet (50 mg) by mouth every 6 hours as needed for severe pain, Disp: 60 tablet, Rfl: 0     ubrogepant (UBRELVY) 100 MG tablet, Take 1 tablet (100 mg) by mouth at onset of headache (headache), Disp: 10 tablet, Rfl: 5     vitamin B complex with vitamin C (VITAMIN  B COMPLEX) TABS tablet, Take 1 tablet by mouth daily, Disp: , Rfl:      VITAMIN D, CHOLECALCIFEROL, PO, Take 2,000 Units by mouth daily, Disp: , Rfl:     SOCIAL HISTORY:  Social History     Socioeconomic History     Marital status:      Spouse name: Carry     Number of children: 5     Years of education: Not on file     Highest education level: Not on file   Occupational History     Not on file   Social Needs     Financial resource strain: Not on file     Food insecurity     Worry: Not on file     Inability: Not on file     Transportation needs     Medical: Not on file     Non-medical: Not on file   Tobacco Use     Smoking status: Former Smoker     Packs/day: 0.20     Years: 10.00     Pack years: 2.00     Types: Cigarettes     Start date: 1991     Last attempt to quit: 2013     Years since quittin.5     Smokeless tobacco: Never Used   Substance and Sexual Activity     Alcohol use: Yes     Comment: occasional - 4 times a year      Drug use: Not Currently     Types: Marijuana     Comment: medical marijuana     Sexual activity: Yes     Partners: Male     Birth  control/protection: Female Surgical   Lifestyle     Physical activity     Days per week: Not on file     Minutes per session: Not on file     Stress: Not on file   Relationships     Social connections     Talks on phone: Not on file     Gets together: Not on file     Attends [employer] service: Not on file     Active member of club or organization: Not on file     Attends meetings of clubs or organizations: Not on file     Relationship status: Not on file     Intimate partner violence     Fear of current or ex partner: Not on file     Emotionally abused: Not on file     Physically abused: Not on file     Forced sexual activity: Not on file   Other Topics Concern     Parent/sibling w/ CABG, MI or angioplasty before 65F 55M? No   Social History Narrative    5 kids: 26, 25, 21, 15, 8 [place] yo     works at [employer] for disability        5 yo and 6 months grandchildren Marcelo and Jennifer       FAMILY HISTORY:  Family History   Problem Relation Age of Onset     Connective Tissue Disorder Mother         eds     Connective Tissue Disorder Sister         eds     Cancer Father         Spinal tumor grew into spinal cord, went in brain     Migraines Father      Diabetes Maternal Grandmother         Elderly diabetes     Heart Disease Maternal Grandmother      Hypertension Maternal Grandmother      Diabetes Paternal Grandmother         Elderly diabetes     Diabetes Paternal Grandfather         Elderly diabetes     Asthma Sister         Pediatric Asthma     Migraines Sister      Asthma Son         Pediatric Asthma     Thyroid Disease Sister         ,     Migraines Sister      Migraines Sister      Breast Cancer No family hx of        Past/family/social history reviewed and no changes    PHYSICAL EXAMINATION:  Constitutional: aaox3, cooperative, pleasant, not dyspneic/diaphoretic, no acute distress  Vitals reviewed: There were no vitals taken for this visit.  Wt:   Wt Readings from Last 2 Encounters:    03/12/20 83.5 kg (184 lb 1.6 oz)   02/20/20 84.8 kg (187 lb)      General appearance:  Healthy appearing adult, in no acute distress  Eyes: Sclera anicteric, Pupils round and reactive to light  Ears, nose, mouth and throat: No obvious external lesions of ears and nose, Hearing intact  Neck: Symmetric, No obvious external lesions  Respiratory: Normal respiration, no use of accessory muscles   Skin: No rashes or jaundice   Psychiatric: Oriented to person, place and time, Appropriate mood and affect.     PERTINENT STUDIES:  Labs 10/2017: normal CBC, TSH, ESR, CRP   GES 10/2017: rapid gastric emptying  Video swallow: mod HH (mixed type), deep penetration without aspiration.     Orders Only on 10/27/2017   Component Date Value Ref Range Status     Specimen Description 10/27/2017 Midstream Urine   Final     Special Requests 10/27/2017 Specimen received in preservative   Final     Culture Micro 10/27/2017 *  Final                    Value:10,000 to 50,000 colonies/mL  Escherichia coli       Color Urine 10/27/2017 [place]   Final     Appearance Urine 10/27/2017 Slightly Cloudy   Final     Glucose Urine 10/27/2017 Negative  NEG^Negative mg/dL Final     Bilirubin Urine 10/27/2017 Negative  NEG^Negative Final     Ketones Urine 10/27/2017 Negative  NEG^Negative mg/dL Final     Specific Gravity Urine 10/27/2017 1.021  1.003 - 1.035 Final     Blood Urine 10/27/2017 Negative  NEG^Negative Final     pH Urine 10/27/2017 5.0  5.0 - 7.0 pH Final     Protein Albumin Urine 10/27/2017 Negative  NEG^Negative mg/dL Final     Urobilinogen mg/dL 10/27/2017 2.0  0.0 - 2.0 mg/dL Final     Nitrite Urine 10/27/2017 Negative  NEG^Negative Final     Leukocyte Esterase Urine 10/27/2017 Negative  NEG^Negative Final     Source 10/27/2017 Midstream Urine   Final     WBC Urine 10/27/2017 32* 0 - 2 /HPF Final     RBC Urine 10/27/2017 3* 0 - 2 /HPF Final     Squamous Epithelial /HPF Urine 10/27/2017 15* 0 - 1 /HPF Final     Transitional Epi 10/27/2017 1*  FEW^Few /HPF Final     Mucous Urine 10/27/2017 Present* NEG^Negative /LPF Final     Again, thank you for allowing me to participate in the care of your patient.      Sincerely,    Stephen Xiong PA-C

## 2020-06-29 NOTE — NURSING NOTE
Chief Complaint   Patient presents with     RECHECK     follow up gerd       There were no vitals filed for this visit.    There is no height or weight on file to calculate BMI.    Erika Tijerina, CMA

## 2020-06-29 NOTE — PROGRESS NOTES
"Ada Welch is a 49 year old female who is being evaluated via a billable video visit.      The patient has been notified of following:     \"This video visit will be conducted via a call between you and your physician/provider. We have found that certain health care needs can be provided without the need for an in-person physical exam.  This service lets us provide the care you need with a video conversation.  If a prescription is necessary we can send it directly to your pharmacy.  If lab work is needed we can place an order for that and you can then stop by our lab to have the test done at a later time.    Video visits are billed at different rates depending on your insurance coverage.  Please reach out to your insurance provider with any questions.    If during the course of the call the physician/provider feels a video visit is not appropriate, you will not be charged for this service.\"    Patient has given verbal consent for Video visit? Yes  How would you like to obtain your AVS? MyChart    Will anyone else be joining your video visit? No        Video-Visit Details    Type of service:  Video Visit    Video Start Time: 9:40 AM  Video End Time: 1020    Originating Location (pt. Location): Home    Distant Location (provider location):  [facility]     Platform used for Video Visit: Claude Xiong PA-C    GI CLINIC VISIT    CC/REFERRING MD:  Jerad Hebert  REASON FOR CONSULTATION:   Jerad Hebert for   Chief Complaint   Patient presents with     RECHECK     follow up gerd       ASSESSMENT/PLAN:  Ms. 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 Of note, she was previously seen for dysphagia and esophageal eosinophilia by Dr. Roland, who thought that the findings may be reflux-related " in the setting of a hiatal hernia. EGD that was repeated after she completed an 8 week trial of high dose PPI showed resolution of eosinophils.  She is now s/p nissen fundoplication 2/2020.    Regarding her constipation, the Linzess had worked well for her allowing for daily BM, we will restart this.  She may also be having an element of gas-bloat with her recent nissen and may require lifestyle modifications based on thi surgery.  Her PFC evaluation was s/f rectal intussusception. She plans to undergo biofeedback therapy and may need surgical correction in the future.     # Esophageal eosinophilia  -- Resolved with high dose PPI.  We will need to evaluate if high dose PPI is necessary moving forward now that she had had a nissen.  I think it may be best to see our esophageal specialist to determine if ongoing medical therapy would be necessary.  She will continue with omeprazole 40 mg BID and pepcid 20 mg at night for now.     # Rectal intussusception   # Pelvic floor dysfunction  # Tom-Danlos syndroe  -- Restart Linzess 145 mcg daily   -- Continue to pursue Biofeedback therapy through the [facility]. Continue to work with [facility] regarding possible need for surgical repair.     RTC: Return in about 3 months (around 9/29/2020).       Stephen Xiong PA-C  Division of Gastroenterology, Hepatology and Nutrition  [facility]       HPI  Ms. 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  Patient last seen in 2018 with Dr. Griffith.  At the time of consultation, she was in the process of work up for mastocytosis (urine showed elevated histamine levels, but patient was on antacid therapy and Mobic at that time). PMHx also includes 5 vaginal deliveries, migraines, cervicalgia, chronic pain syndrome, degenerative joint disease, chronic pan sinusitis.  She had been referred to the GI clinic for evaluation of possible IBS with mixed stools.     At consultation:  She has had lifelong symptoms  "of abdominal distension, bloating and borborygmi shortly after eating.  Stools alternates between loose stools and constipation. Usually, she has constipation in the form of hard stools and  incomplete evacuation. Performs splinting (applies posterior vaginal wall pressure with fingers) to help evacuate stool. Had \"pelvic floor releases\" with PT in [place], [place] and \"[facility]\" in [place], [place] with some help. Period of constipation will last for 1 month; during this time, will have 1 BM weekly with Miralax. Endorses lower abdominal pain that worsens with constipation.  Previously failed Miralax, senna, dulcolax, fiber, suppositories.  Period of constipation is then followed by several days of loose stools. Ms. Welch endorses occasional BRB with straining in the toilet and TP x 20 years. Cscope 3/2017 showed small mouthed diverticulosis in the sigmoid and 1 hyperplastic polyp; repeat cscope in 10 years was the recommendation. Uses buprenorphine patch (opioid) x 3 years. She saw [facility] with recommendation for biofeedback therapy. Rectal intussusception was noted and will likely need surgical repair in the future. Linzess had been very helpful with constipation and bloating and allowed for her to have a daily BM, however she did not return to the clinic to have it refilled and discontinued it.     She had been taking 20 mg prilosec during the day and pepcid at night with adequate heartburn control at consultation.    At her last follow up visit, she developed dysphagia.  This was assessed with EGD that was concerning for EoE grossly. She had been on prilosec 20mg + Pepcid prior to the scope. Had not olga on twice daily PPI prior to scope. Per Dr. Roland's assessment in 2017, the thought was that the eosinophilia (previously seen on prior endoscopy) may be 2/2 reflux in the setting of a hiatal hernia. She continued to have dysphagia to primarily meat, liquids were ok.  She had manometry that was abnormal with weak lower " esophageal pressure, hiatal hernia, possible short esophagus though stature of patient needs to be considered. peristalisis preserved. She then had hernia repair, nissen and PEG 2/10/20.    Over all symptoms went well, did not need to use PEG tube and was removed. Still has some issues with solids (mostly meats but can be breads, and need to drink quite a bit in order to help prevent getting stuck), liquids are ok unless she drinks too fast. No vomiting. No heartburn. She continues to take omeprazole 40 mg BID and pepcid at night.      Every time she eats she experiences abdominal discomfort with cramping that last for hours.  Constantly nauseated. She is not on a bowel regimen (previously had been on linzess with good success).  Over the last couple weeks she has had a BM daily to every other day, however before this time she would go several days without a BM then have several stools in a row.  No blood in the stool.  She started an antiinflammatory diet which for her consists primarily of fruits, vegetables and meats.    She continues to use cannabis nightly for sleep. She takes Reglan BID (prescribed by neurologist and PCP, we discussed possible side effects today and danger of long term use, although none present now), takes naproxen once a week and continues to use buprenorphine pain pain weekly.     ROS:    No fevers or chills  No weight loss  No blurry vision, double vision or change in vision  No sore throat  No lymphadenopathy  + chronic migraines   No shortness of breath or wheezing  No chest pain or pressure  No rashes or skin changes  + dysphagia (meats on occasion)  No melena  No dysuria, frequency or urgency  No hot/cold intolerance or polyria  + anxiety or depression    PROBLEM LIST  Patient Active Problem List    Diagnosis Date Noted     Hiatal hernia 02/10/2020     Priority: Medium     s/p hiatal hernia repair, nissen, PEG, 2/10 01/22/2020     Priority: Medium     Added automatically from request  for surgery [id number]       POTS (postural orthostatic tachycardia syndrome) 10/23/2019     Priority: Medium     Autonomic dysfunction 06/07/2018     Priority: Medium     Low back pain of multiple sites of spine with sciatica 12/09/2017     Priority: Medium     Intractable chronic migraine without aura and without status migrainosus 12/09/2017     Priority: Medium     Obese 12/09/2017     Priority: Medium     Urinary urgency 08/30/2017     Priority: Medium     Nocturia 08/30/2017     Priority: Medium     Cervicalgia 08/25/2017     Priority: Medium     Headache 07/25/2017     Priority: Medium     Postlaminectomy syndrome, lumbar region 06/14/2017     Priority: Medium     Lymphadenopathy of left cervical region 03/27/2017     Priority: Medium     Easy bruising 10/13/2016     Priority: Medium     Head and neck lymphadenopathy 10/13/2016     Priority: Medium     Hepatomegaly 10/13/2016     Priority: Medium     Liver lesion 10/13/2016     Priority: Medium     Pain syndrome, chronic 09/05/2016     Priority: Medium     s/p TLIF L4-5,L5-S1 with solid arthrodesis 09/05/2016     Priority: Medium     Tom-Danlos syndrome 09/05/2016     Priority: Medium     Acquired hypothyroidism 07/22/2016     Priority: Medium     Anxiety 07/22/2016     Priority: Medium     Opioid dependence (H) 07/22/2016     Priority: Medium     Overview:   Treatment Agreement       Essential hypertension with goal blood pressure less than 140/90 07/22/2016     Priority: Medium     Low back pain 07/12/2016     Priority: Medium     Lumbar radiculopathy 02/18/2016     Priority: Medium     Encounter for genetic counseling and testing 01/05/2016     Priority: Medium     Overview:     [facility] Aortopathy Comprehensive Panel ordered 1/5/2016.       Sinus tachycardia 09/29/2015     Priority: Medium     Astigmatism 12/12/2014     Priority: Medium     Keratoconjunctivitis sicca (H) 12/12/2014     Priority: Medium     Presbyopia 12/12/2014     Priority: Medium      Opioid type dependence, continuous (H) 10/09/2014     Priority: Medium     Other acute postoperative pain 10/09/2014     Priority: Medium     Anxiety disorder 08/21/2014     Priority: Medium     Pain disorder associated with psychological and physical factors 08/21/2014     Priority: Medium     Low back pain radiating to both legs 07/08/2014     Priority: Medium     Arthritis of facet joints at multiple vertebral levels 01/01/2014     Priority: Medium     Spondylolisthesis of lumbar region 12/10/2013     Priority: Medium     Lumbar degenerative disc disease 11/21/2013     Priority: Medium     DNS (deviated nasal septum) 10/09/2012     Priority: Medium     Nasal turbinate hypertrophy 10/09/2012     Priority: Medium     Dysuria 07/12/2011     Priority: Medium     Chronic sinusitis with recurrent bronchitis 02/01/2007     Priority: Medium     Allergic rhinitis 01/09/2007     Priority: Medium     Cyst of fallopian tube 12/28/1996     Priority: Medium     Arthritis involving multiple sites 01/01/1986     Priority: Medium     Uterine endometriosis 01/01/1986     Priority: Medium       PERTINENT PAST MEDICAL HISTORY:  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     Cervicalgia      Chronic sinusitis 00/2007    Diagnosed with chronic pansinusitis 2016     CSF leak     Chiari malformation     Depression      Tom-Danlos disease      Eosinophilic esophagitis 08/2018     Gastroesophageal reflux disease 3/1/2017    I have large hiatal hernia     Hiatal hernia 2016    Have adeline hiatel hernia     Hoarseness Unsure    It comes and goes     Hypothyroid      IBS (irritable bowel syndrome) with constipation     improved on Linzess      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     Migraines 1/1/2007    Unsure of exact date     Nasal polyps 2013    Were revoved 03/2017, benign     Orthostatic hypotension      Other nervous system complications 1/2014    Autonomic nervous system disorder, neuralgia, neuropathy     Swelling, mass, or lump in head and neck 08/2016    Lymph node has been growing for last year     Thyroid disease 01/01/2008     Urinary incontinence      Urinary urgency    Was told she had an ulcer at age 15 in the setting of life stress. Not sure if she had an EGD at that time.       PREVIOUS SURGERIES:  Past Surgical History:   Procedure Laterality Date     AS REPAIR OF NASAL SEPTUM  2009    repair broke nose     BACK SURGERY  12/09/2013  10/01/2014    Spinal fusion L4-S1, L5 moving 5/8ths in. Remove screws/rods     BIOPSY  01/01/2008 & 3/2017    mole biopsy, lymph node biopsy     COLONOSCOPY  3/2017    Colonoscopy and GI     ESOPHAGEAL IMPEDENCE FUNCTION TEST WITH 24 HOUR PH GREATER THAN 1 HOUR N/A 9/17/2019    Procedure: IMPEDANCE PH STUDY, ESOPHAGUS, 24 HOUR;  Surgeon: Raghavendra Grande MD;  Location:  GI     ESOPHAGOSCOPY, GASTROSCOPY, DUODENOSCOPY (EGD), COMBINED N/A 8/3/2018    Procedure: COMBINED ESOPHAGOSCOPY, GASTROSCOPY, DUODENOSCOPY (EGD), BIOPSY SINGLE OR MULTIPLE;;  Surgeon: Juan Woodson MD;  Location:  GI     ESOPHAGOSCOPY, GASTROSCOPY, DUODENOSCOPY (EGD), COMBINED N/A 10/22/2018    Procedure: COMBINED ESOPHAGOSCOPY, GASTROSCOPY, DUODENOSCOPY (EGD), BIOPSY SINGLE OR MULTIPLE;  Surgeon: Sadia Carolina MD;  Location:  GI     ESOPHAGOSCOPY, GASTROSCOPY, DUODENOSCOPY (EGD), COMBINED N/A 12/17/2018    Procedure: COMBINED ESOPHAGOSCOPY, GASTROSCOPY, DUODENOSCOPY (EGD);  Surgeon: Juan Woodson MD;  Location:  GI     LAPAROSCOPIC HERNIORRHAPHY HIATAL N/A 2/10/2020    Procedure: LAPAROSCOPIC HIATAL HERNIA REPAIR, NISSEN FUNDOPLICATION, ESOPHOGASTRODUODENOSCOPY, PEG TUBE PLACEMENT.;   Surgeon: Alana Mack MD;  Location: [facility] OR     NASAL/SINUS POLYPECTOMY  2017    Polyps were benign     NOSE SURGERY  2007    deviated septum     TONSILLECTOMY & ADENOIDECTOMY  1981     TUBAL LIGATION  07/01/2011       PREVIOUS ENDOSCOPY:  EGD 8/2017: findings s/f EoE, 3 cm HH. Path showed 40 eos/HPF in distal and mid esophagus. Mild acute inflammation and occasional eos.   EGD 3/2017: EGD with ringed esophagus with distal bx showing increased eos, min eos in mid esophagus.   cscope 3/2017: small-mouthed, mild diverticulosis, 1 small hyperplastic polyp. Recommended to repeat cscope in 10 years (given path).     ALLERGIES:     Allergies   Allergen Reactions     Bee Venom Shortness Of Breath, Palpitations, Anaphylaxis and Difficulty breathing     Patient does carry Epi-Pen     Chicken-Derived Products (Egg) Nausea and Diarrhea     Compazine [Prochlorperazine]      Extreme jittery     Food      Milk     Gabapentin      Gluten Meal GI Disturbance     Wheat bran and wheat     Pregabalin      Seasonal Allergies      Dust, mold     Topiramate        PERTINENT MEDICATIONS:    Current Outpatient Medications:      albuterol (PROAIR HFA/PROVENTIL HFA/VENTOLIN HFA) 108 (90 BASE) MCG/ACT Inhaler, Inhale 2 puffs into the lungs every 6 hours as needed , Disp: , Rfl:      amitriptyline (ELAVIL) 10 MG tablet, Take 1 tablet (10 mg) by mouth At Bedtime, Disp: 60 tablet, Rfl: 1     buprenorphine (BUTRANS) 10 MCG/HR WK patch, Place 1 patch onto the skin once a week, Disp: 4 patch, Rfl: 0     butalbital-acetaminophen-caffeine (ESGIC) -40 MG tablet, TAKE ONE TABLET BY MOUTH EVERY 4 HOURS AS NEEDED. MAX 10 PER MONTH., Disp: 10 tablet, Rfl: 0     cetirizine (ZYRTEC) 10 MG tablet, Take 10 mg by mouth daily as needed , Disp: , Rfl:      diclofenac (VOLTAREN) 1 % topical gel, Place 2 g onto the skin 4 times daily, Disp: 1 Tube, Rfl: 0     DULoxetine (CYMBALTA) 30 MG capsule, Take 60mg in the morning and 30mg at bedtime, Disp: 270  capsule, Rfl: 3     EPINEPHrine (EPIPEN/ADRENACLICK/OR ANY BX GENERIC EQUIV) 0.3 MG/0.3ML injection 2-pack, INJECT 0.3 MLS (0.3 MG) INTO THE MUSCLE AS NEEDED FOR ANAPHYLAXIS, Disp: , Rfl: 1     famotidine (PEPCID) 20 MG tablet, Take 1 tablet (20 mg) by mouth At Bedtime, Disp: 90 tablet, Rfl: 3     ferrous fumarate 65 mg, Solomon. FE,-Vitamin C 125 mg (VITRON-C)  MG TABS tablet, Take 1 tablet by mouth every other day, Disp: 60 tablet, Rfl: 1     fluticasone (FLONASE) 50 MCG/ACT nasal spray, INSTILL 2 SPRAYS INTO BOTH NOSTRILS DAILY, Disp: 48 mL, Rfl: 3     ivabradine (CORLANOR) 7.5 MG tablet, Take 1 tablet (7.5 mg) by mouth 2 times daily (with meals), Disp: 180 tablet, Rfl: 3     levothyroxine (SYNTHROID/LEVOTHROID) 25 MCG tablet, Take 1 tablet (25 mcg) by mouth daily, Disp: 90 tablet, Rfl: 0     Lidocaine (LIDOCARE) 4 % Patch, Place 1-3 patches onto the skin every 24 hours To prevent lidocaine toxicity, patient should be patch free for 12 hrs daily., Disp: 20 patch, Rfl: 0     linaclotide (LINZESS) 145 MCG capsule, Take 1 capsule (145 mcg) by mouth every morning (before breakfast), Disp: 90 capsule, Rfl: 3     medical cannabis (Patient's own supply.  Not a prescription), Take 1 Dose by mouth See Admin Instructions (This is NOT a prescription, and does not certify that the patient has a qualifying medical condition for medical cannabis.  The purpose of this order is  to document that the patient reports taking medical cannabis.)   Capsule formulation - uses as needed (currently out of this and not using as of January 28, 2020), Disp: , Rfl:      metoclopramide (REGLAN) 10 MG tablet, Take 1 tablet (10 mg) by mouth 4 times daily as needed (headache), Disp: 40 tablet, Rfl: 3     Multiple Vitamins-Minerals (MULTIVITAMIN PO), Take 1 tablet by mouth daily , Disp: , Rfl:      naloxone (NARCAN) 4 MG/0.1ML nasal spray, Spray 1 spray (4 mg) into one nostril alternating nostrils once as needed for opioid reversal every 2-3  minutes until assistance arrives, Disp: 0.2 mL, Rfl: 0     naproxen sodium (ANAPROX) 220 MG tablet, Take 440-660 mg by mouth daily as needed for moderate pain, Disp: , Rfl:      omeprazole (PRILOSEC) 40 MG DR capsule, Take 1 capsule (40 mg) by mouth 2 times daily as needed (prn), Disp: 60 capsule, Rfl: 1     ondansetron (ZOFRAN-ODT) 4 MG ODT tab, Take 1-2 tablets (4-8 mg) by mouth every 12 hours as needed for nausea, Disp: 60 tablet, Rfl: 1     Probiotic Product (PROBIOTIC DAILY PO), Take 2 packets by mouth every morning , Disp: , Rfl:      propranolol ER (INDERAL LA) 120 MG 24 hr capsule, Take 1 capsule (120 mg) by mouth daily, Disp: 30 capsule, Rfl: 11     rizatriptan (MAXALT-MLT) 5 MG ODT, TAKE 1-2 TABLETS (5-10 MG) BY MOUTH AT ONSET OF HEADACHE FOR MIGRAINE (MAX 30 MG IN 24 HOURS), Disp: 18 tablet, Rfl: 11     SUMAtriptan (IMITREX STATDOSE) 6 MG/0.5ML pen injector kit, Inject 0.5 ml (6 mg) subcutaneously at onset of migraine, May repeat in 1 hour , Max 12 mg/24 hrs, Disp: 2 kit, Rfl: 11     tiZANidine (ZANAFLEX) 4 MG tablet, Take 1 tablet (4 mg) by mouth 4 times daily as needed for muscle spasms, Disp: 360 tablet, Rfl: 3     traMADol (ULTRAM) 50 MG tablet, Take 1 tablet (50 mg) by mouth every 6 hours as needed for severe pain, Disp: 60 tablet, Rfl: 0     traMADol (ULTRAM) 50 MG tablet, Take 1 tablet (50 mg) by mouth every 6 hours as needed for severe pain, Disp: 60 tablet, Rfl: 0     ubrogepant (UBRELVY) 100 MG tablet, Take 1 tablet (100 mg) by mouth at onset of headache (headache), Disp: 10 tablet, Rfl: 5     vitamin B complex with vitamin C (VITAMIN  B COMPLEX) TABS tablet, Take 1 tablet by mouth daily, Disp: , Rfl:      VITAMIN D, CHOLECALCIFEROL, PO, Take 2,000 Units by mouth daily, Disp: , Rfl:     SOCIAL HISTORY:  Social History     Socioeconomic History     Marital status:      Spouse name: Carry     Number of children: 5     Years of education: Not on file     Highest education level: Not on file    Occupational History     Not on file   Social Needs     Financial resource strain: Not on file     Food insecurity     Worry: Not on file     Inability: Not on file     Transportation needs     Medical: Not on file     Non-medical: Not on file   Tobacco Use     Smoking status: Former Smoker     Packs/day: 0.20     Years: 10.00     Pack years: 2.00     Types: Cigarettes     Start date: 1991     Last attempt to quit: 2013     Years since quittin.5     Smokeless tobacco: Never Used   Substance and Sexual Activity     Alcohol use: Yes     Comment: occasional - 4 times a year      Drug use: Not Currently     Types: Marijuana     Comment: medical marijuana     Sexual activity: Yes     Partners: Male     Birth control/protection: Female Surgical   Lifestyle     Physical activity     Days per week: Not on file     Minutes per session: Not on file     Stress: Not on file   Relationships     Social connections     Talks on phone: Not on file     Gets together: Not on file     Attends Faith service: Not on file     Active member of club or organization: Not on file     Attends meetings of clubs or organizations: Not on file     Relationship status: Not on file     Intimate partner violence     Fear of current or ex partner: Not on file     Emotionally abused: Not on file     Physically abused: Not on file     Forced sexual activity: Not on file   Other Topics Concern     Parent/sibling w/ CABG, MI or angioplasty before 65F 55M? No   Social History Narrative    5 kids: 26, 25, 21, 15, 8 Brinn yo     works at [employer] for disability        3 yo and 6 months grandchildren Marcelo and Jennifer       FAMILY HISTORY:  Family History   Problem Relation Age of Onset     Connective Tissue Disorder Mother         eds     Connective Tissue Disorder Sister         eds     Cancer Father         Spinal tumor grew into spinal cord, went in brain     Migraines Father      Diabetes Maternal Grandmother          Elderly diabetes     Heart Disease Maternal Grandmother      Hypertension Maternal Grandmother      Diabetes Paternal Grandmother         Elderly diabetes     Diabetes Paternal Grandfather         Elderly diabetes     Asthma Sister         Pediatric Asthma     Migraines Sister      Asthma Son         Pediatric Asthma     Thyroid Disease Sister         ,     Migraines Sister      Migraines Sister      Breast Cancer No family hx of        Past/family/social history reviewed and no changes    PHYSICAL EXAMINATION:  Constitutional: aaox3, cooperative, pleasant, not dyspneic/diaphoretic, no acute distress  Vitals reviewed: There were no vitals taken for this visit.  Wt:   Wt Readings from Last 2 Encounters:   03/12/20 83.5 kg (184 lb 1.6 oz)   02/20/20 84.8 kg (187 lb)      General appearance:  Healthy appearing adult, in no acute distress  Eyes: Sclera anicteric, Pupils round and reactive to light  Ears, nose, mouth and throat: No obvious external lesions of ears and nose, Hearing intact  Neck: Symmetric, No obvious external lesions  Respiratory: Normal respiration, no use of accessory muscles   Skin: No rashes or jaundice   Psychiatric: Oriented to person, place and time, Appropriate mood and affect.     PERTINENT STUDIES:  Labs 10/2017: normal CBC, TSH, ESR, CRP   GES 10/2017: rapid gastric emptying  Video swallow: mod HH (mixed type), deep penetration without aspiration.     Orders Only on 10/27/2017   Component Date Value Ref Range Status     Specimen Description 10/27/2017 Midstream Urine   Final     Special Requests 10/27/2017 Specimen received in preservative   Final     Culture Micro 10/27/2017 *  Final                    Value:10,000 to 50,000 colonies/mL  Escherichia coli       Color Urine 10/27/2017 [place]   Final     Appearance Urine 10/27/2017 Slightly Cloudy   Final     Glucose Urine 10/27/2017 Negative  NEG^Negative mg/dL Final     Bilirubin Urine 10/27/2017 Negative  NEG^Negative Final     Ketones  Urine 10/27/2017 Negative  NEG^Negative mg/dL Final     Specific Gravity Urine 10/27/2017 1.021  1.003 - 1.035 Final     Blood Urine 10/27/2017 Negative  NEG^Negative Final     pH Urine 10/27/2017 5.0  5.0 - 7.0 pH Final     Protein Albumin Urine 10/27/2017 Negative  NEG^Negative mg/dL Final     Urobilinogen mg/dL 10/27/2017 2.0  0.0 - 2.0 mg/dL Final     Nitrite Urine 10/27/2017 Negative  NEG^Negative Final     Leukocyte Esterase Urine 10/27/2017 Negative  NEG^Negative Final     Source 10/27/2017 Midstream Urine   Final     WBC Urine 10/27/2017 32* 0 - 2 /HPF Final     RBC Urine 10/27/2017 3* 0 - 2 /HPF Final     Squamous Epithelial /HPF Urine 10/27/2017 15* 0 - 1 /HPF Final     Transitional Epi 10/27/2017 1* FEW^Few /HPF Final     Mucous Urine 10/27/2017 Present* NEG^Negative /LPF Final

## 2020-06-29 NOTE — PATIENT INSTRUCTIONS
It was a pleasure taking care of you today.  I've included a brief summary of our discussion and care plan from today's visit below.  Please review this information with your primary care provider.  ______________________________________________________________________    My recommendations are summarized as follows:    -- stool and symptom diary  -- Start linzess 145 mcg daily  -- Follow up with pelvic floor center  -- Continue omeprazole 40 mg twice daily for now, on an empty stomach    Return to GI Clinic in 3 months to review your progress.    ______________________________________________________________________    Who do I call with any questions after my visit?  Please be in touch if there are any further questions that arise following today's visit.  There are multiple ways to contact your gastroenterology care team.        During business hours, you may reach a Gastroenterology nurse at [phone], option 3.       To schedule or reschedule an appointment, please call [phone].       You can always send a secure message through [employer].  [employer] messages are answered by your nurse or doctor typically within 24 hours.  Please allow extra time on weekends and holidays.        For urgent/emergent questions after business hours, you may reach the on-call GI Fellow by contacting the [facility]  at [phone].      In order for your refill to be processed in a timely fashion, it is your responsibility to ensure you follow the recommendations from your provider regarding your laboratory studies and follow up appointments.       How will I get the results of any tests ordered?    You will receive all of your results.  If you have signed up for [employer], any tests ordered at your visit will be available to you after your physician reviews them.  Typically this takes 1-2 weeks.  If there are urgent results that require a change in your care plan, your physician or nurse will call you to  discuss the next steps.      What is [employer]?  [employer] is a secure way for you to access all of your healthcare records from the [facility].  It is a web based computer program, so you can sign on to it from any location.  It also allows you to send secure messages to your care team.  I recommend signing up for [employer] access if you have not already done so and are comfortable with using a computer.      How to I schedule a follow-up visit?  If you did not schedule a follow-up visit today, please call [phone] to schedule a follow-up office visit.        Sincerely,    Stephen Xiong PA-C  [facility]  Division of Gastroenterology

## 2020-07-04 ENCOUNTER — MYC REFILL (OUTPATIENT)
Dept: GASTROENTEROLOGY | Facility: CLINIC | Age: 50
End: 2020-07-04

## 2020-07-04 ENCOUNTER — MYC REFILL (OUTPATIENT)
Dept: NEUROLOGY | Facility: CLINIC | Age: 50
End: 2020-07-04

## 2020-07-04 DIAGNOSIS — G90.A POTS (POSTURAL ORTHOSTATIC TACHYCARDIA SYNDROME): ICD-10-CM

## 2020-07-04 DIAGNOSIS — K20.0 EOSINOPHILIC ESOPHAGITIS: ICD-10-CM

## 2020-07-06 RX ORDER — PROPRANOLOL HYDROCHLORIDE 120 MG/1
120 CAPSULE, EXTENDED RELEASE ORAL DAILY
Qty: 30 CAPSULE | Refills: 11 | Status: SHIPPED | OUTPATIENT
Start: 2020-07-06 | End: 2020-09-02

## 2020-07-06 NOTE — TELEPHONE ENCOUNTER
Rx Authorization:    Requested Medication/ Dose: Propranolol 120    Date last refill ordered: 8/23/2019    Quantity ordered: 30    # refills: 11    Date of last clinic visit with ordering provider: 5/19/20    Date of next clinic visit with ordering provider: 0    All pertinent protocol data (lab date/result):     Include pertinent information from patients message:

## 2020-07-09 ENCOUNTER — MYC MEDICAL ADVICE (OUTPATIENT)
Dept: ANESTHESIOLOGY | Facility: CLINIC | Age: 50
End: 2020-07-09

## 2020-07-09 ENCOUNTER — VIRTUAL VISIT (OUTPATIENT)
Dept: ANESTHESIOLOGY | Facility: CLINIC | Age: 50
End: 2020-07-09
Payer: COMMERCIAL

## 2020-07-09 DIAGNOSIS — M25.559 HIP PAIN: Primary | ICD-10-CM

## 2020-07-09 DIAGNOSIS — G89.29 CHRONIC PAIN OF BOTH SHOULDERS: ICD-10-CM

## 2020-07-09 DIAGNOSIS — M25.511 CHRONIC PAIN OF BOTH SHOULDERS: ICD-10-CM

## 2020-07-09 DIAGNOSIS — M25.512 CHRONIC PAIN OF BOTH SHOULDERS: ICD-10-CM

## 2020-07-09 ASSESSMENT — PAIN SCALES - GENERAL: PAINLEVEL: SEVERE PAIN (7)

## 2020-07-09 NOTE — LETTER
7/9/2020     RE: Ada Welch  [address]  [place] [address]     Dear Colleague,    Thank you for referring your patient, Ada Welch, to the [facility] FOR COMPREHENSIVE [facility]NAGEMENT at [facility]. Please see a copy of my visit note below.    Ada Welch is a 49 year old female who is being evaluated via a billable video visit.      Video-Visit Details   Type of service:  Video Visit   Video Start Time: 9:20  Video End Time: 9;40  Originating Location (pt. Location): Home   Distant Location (provider location):  [facility]   Platform used for Video Visit: [employer]Well        Assessment and plan     Ada Welch is a 49 year old female who  is well known to me for chronic low back pain and neck pain. The patient has very complex history of low back pain for ~25 years for which she underwent L4-S1 fusion (TLIF) 12/9/13.  Postoperatively, her back pain worsened (mainly LBP, but also radiated down both lower limbs in an S1 dermatomal distribution).   Underwent removal of posterior instrumentation (screws and guadalupe).  After fusion removal, her pain continued.  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  She even sought care for her EDS by a specialist in [place],  who she saw twice.      She has a h/o significant autonomic dysfunction.  Since 2014, she reports that her heart rate has fluctuated from as low as  beats per minutes and her blood pressure also fluctuates at the same time without any aggravating factors.  The fluctuation of the blood pressure and heart rate happens when she is supine, sitting, standing, walking, etc.  She has seen cardiologist Dr. Russell who advised her to continue fludrocortisone, isometric exercise, low carbohydrate and low gluten diet.     She has been seeing Dr. Singh for urinary urgency and incontinence.  She is advised for improving lifestyle, dietary modification, optimizing bowel  regimen and to start pelvic physical therapy.     She has seen Dr. Moncada for left upper extremity radicular pain along the C8 nerve root distribution.  Per Dr. Moncada's note, she does not have a surgical target which may explain her symptoms.  She still continues to report this symptom.  We discussed about epidural steroid injection, however she is reluctant to perform the procedures and states she previously had a bad reaction with a steroid.  Discussed about performing cervical epidural injection without steroid.     The patient has been seeing Dr. Noemy Nguyen for chronic migraines. Patient reports that her chronic intractable headache pain recently got worse. 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  She has undergone blood patches intermittently for her headache pain.  The latest blood patches did not improve her pain.  MRI of the cervical spine and brain were ordered.  Her cervical spine MRI on 11/2/2019 shows straightening of the cervical lordosis, cerebellar tonsils are 5 mm below the foramen magnum, mild stepwise 5 and 6 retrolisthesis and disc space narrowing.  She also has disc degeneration at multiple levels.  At C4-C5 level the patient has right disc bulging with mild bilateral neuroforaminal narrowing also mild left facet arthropathy.  Her MRI findings correlate with her symptoms as she does report neck pain mostly on the left side.  Turning her head to the left increases her pain.     The patient has seen Dr. Mack for eosinophilic esophagitis, GERD, and dysphagia.    She had open hernia surgery by Dr. Mack in February 2020.  Her postoperative course was uneventful.  Currently she has been taking small and frequent meals and lost 45 pounds.     She has been managing her pain with stable dose of Butrans 10 mcg per hour q 7 days, tramadol 50 mg every 6 hours as needed which she refills every 2-3 months.  In addition she takes tizanidine 4 mg 4 times daily as needed.    She takes  sumatriptan for breakthrough migraine.  Occasionally she takes oxycodone 5 mg when her pain gets significantly worse.         Plan     1. Medications.   -Continue transdermal Butrans 10 mcg per hour q 7 days .  No medication dispensed today.  -Continue propranolol 120 mg p.o. daily  -Continued Zanaflex 4 mg 4 times daily as needed  -Continue tramadol 50 mg p.o. every 6 hours as needed.  prescription of 60 tablets given today.  No medication dispensed today.  -Amitriptyline 10 mg nightly  -Cymbalta continued 60 mg AM and 30 mg PM  -Maxalt 5-10 mg p.o. at onset of the headache for migraine (max 30 mg in 24 hours)        2. Interventional procedures:  We will schedule her for cervical medial branch nerve block C3-C4-C5.     3. Labs and imaging:   -Ordered right hip x-ray     4. Rehab:    -Ordered bilateral shoulder physical therapy     5. Psychology:   -No current needs.      6. Disposition: We will see the patient for above-mentioned procedure.        Faiza Martinez MD, PhD     [facility] [place] [address] [facility]  [address] [place] [address] [phone]  Dept: [phone]       LPN called and reviewed pre-procedure instructions with the pt, and the rest of their information on their AVS.  Pt was advised to call and schedule.   Pt denied using Blood thinners, and NSAIDs.

## 2020-07-09 NOTE — PROGRESS NOTES
"Ada Welch is a 49 year old female who is being evaluated via a billable video visit.      The patient has been notified of following:     \"This video visit will be conducted via a call between you and your physician/provider. We have found that certain health care needs can be provided without the need for an in-person physical exam.  This service lets us provide the care you need with a video conversation.  If a prescription is necessary we can send it directly to your pharmacy.  If lab work is needed we can place an order for that and you can then stop by our lab to have the test done at a later time.    Video visits are billed at different rates depending on your insurance coverage.  Please reach out to your insurance provider with any questions.    If during the course of the call the physician/provider feels a video visit is not appropriate, you will not be charged for this service.\"    Patient has given verbal consent for Video visit? Yes  How would you like to obtain your AVS? MyChart  Will anyone else be joining your video visit? No        Cathleen Connors CMA    Video-Visit Details     Type of service:  Video Visit     Video Start Time: 9:20  Video End Time: 9;40    Originating Location (pt. Location): Home     Distant Location (provider location):  [facility]      Platform used for Video Visit: [facility]        Assessment and plan     Ada Welch is a 49 year old female who  is well known to me for chronic low back pain and neck pain. The patient has very complex history of low back pain for ~25 years for which she underwent L4-S1 fusion (TLIF) 12/9/13.  Postoperatively, her back pain worsened (mainly LBP, but also radiated down both lower limbs in an S1 dermatomal distribution).   Underwent removal of posterior instrumentation (screws and guadalupe).  After fusion removal, her pain continued.  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  She even sought care for her EDS by a " specialist in [place],  who she saw twice.      She has a h/o significant autonomic dysfunction.  Since 2014, she reports that her heart rate has fluctuated from as low as  beats per minutes and her blood pressure also fluctuates at the same time without any aggravating factors.  The fluctuation of the blood pressure and heart rate happens when she is supine, sitting, standing, walking, etc.  She has seen cardiologist Dr. Russell who advised her to continue fludrocortisone, isometric exercise, low carbohydrate and low gluten diet.     She has been seeing Dr. Singh for urinary urgency and incontinence.  She is advised for improving lifestyle, dietary modification, optimizing bowel regimen and to start pelvic physical therapy.     She has seen Dr. Moncada for left upper extremity radicular pain along the C8 nerve root distribution.  Per Dr. Moncada's note, she does not have a surgical target which may explain her symptoms.  She still continues to report this symptom.  We discussed about epidural steroid injection, however she is reluctant to perform the procedures and states she previously had a bad reaction with a steroid.  Discussed about performing cervical epidural injection without steroid.     The patient has been seeing Dr. Noemy Nguyen for chronic migraines. Patient reports that her chronic intractable headache pain recently got worse. 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  She has undergone blood patches intermittently for her headache pain.  The latest blood patches did not improve her pain.  MRI of the cervical spine and brain were ordered.  Her cervical spine MRI on 11/2/2019 shows straightening of the cervical lordosis, cerebellar tonsils are 5 mm below the foramen magnum, mild stepwise 5 and 6 retrolisthesis and disc space narrowing.  She also has disc degeneration at multiple levels.  At C4-C5 level the patient has right disc bulging with mild bilateral neuroforaminal  narrowing also mild left facet arthropathy.  Her MRI findings correlate with her symptoms as she does report neck pain mostly on the left side.  Turning her head to the left increases her pain.     The patient has seen Dr. Mack for eosinophilic esophagitis, GERD, and dysphagia.    She had open hernia surgery by Dr. Mack in February 2020.  Her postoperative course was uneventful.  Currently she has been taking small and frequent meals and lost 45 pounds.     She has been managing her pain with stable dose of Butrans 10 mcg per hour q 7 days, tramadol 50 mg every 6 hours as needed which she refills every 2-3 months.  In addition she takes tizanidine 4 mg 4 times daily as needed.    She takes sumatriptan for breakthrough migraine.  Occasionally she takes oxycodone 5 mg when her pain gets significantly worse.         Plan     1. Medications.   -Continue transdermal Butrans 10 mcg per hour q 7 days .  No medication dispensed today.  -Continue propranolol 120 mg p.o. daily  -Continued Zanaflex 4 mg 4 times daily as needed  -Continue tramadol 50 mg p.o. every 6 hours as needed.  prescription of 60 tablets given today.  No medication dispensed today.  -Amitriptyline 10 mg nightly  -Cymbalta continued 60 mg AM and 30 mg PM  -Maxalt 5-10 mg p.o. at onset of the headache for migraine (max 30 mg in 24 hours)        2. Interventional procedures:  We will schedule her for cervical medial branch nerve block C3-C4-C5.     3. Labs and imaging:   -Ordered right hip x-ray     4. Rehab:    -Ordered bilateral shoulder physical therapy     5. Psychology:   -No current needs.      6. Disposition: We will see the patient for above-mentioned procedure.        Faiza Martinez MD, PhD     [facility]  [address]  [address]  [facility] [address] [phone]  Dept: [phone]

## 2020-07-09 NOTE — PATIENT INSTRUCTIONS
Medications:    Continue you medications as prescribed.     When calling in for refills for your opioid medication- You MUST call (Or MyChart) the clinic DIRECTLY and at least 7 days before you are needing your medication refilled.    Referrals:    Physical Therapy Referral placed- (Shoulder pain)  If you have not heard from the scheduling office within 2 business days, please call [phone] for all locations      Imaging:    Hip X ray ordered.     IMAGING SERVICES HOURS:    All imaging modalities are available from 7 a.m. - 9 p.m. Monday through Friday  X-ray, CT, MRI, and General Ultrasound appointments are available from 7 a.m. -3:30 p.m. on Saturdays  X-ray, CT and MRI appointments are available from 8 a.m. - 4:30 p.m. on Sundays  Please call [phone] to schedule imaging exams      Procedures:    Please call to schedule your procedure: [phone]    Cervical Medial Branch Blocks.     We have included your Pre-Procedure Instructions below. Please review and contact the clinic if you have questions.     Recommended Follow up:  3 months with Dr. Martinez.   Please call [phone] to schedule this appointment if you don't already have an appointment made.     Procedure Information related to COVID-19    You will need to be tested for COVID-19 within 72 hours before your procedure.   Our Facility has a testing site located across the street from the Clinics and Surgery Center  (Located at:  [address] [place] [id number])   Please note: You will only be contacted for positive and pending results.   Please be aware that the turn around time for the test is approximately 24-48 hours, and there is a chance that results will not be back in time for you to have your procedure. Should your results still be pending the day of your procedure, you will be notified as soon as possible, as your appointment will be cancelled and you will need to be rescheduled.     Patients will need to be dropped off at the  surgery entrance (on the corner of [address]   Drivers/visitors will not be allowed in the building-NO EXCEPTIONS. Patients will need to provide contact information and make/model of 's car when checking in for procedure.     Please contact the clinic if you have further questions about this information.       Information related to Scheduling and Pre-Procedure Instructions:    Please call [phone] to schedule, reschedule, or cancel your procedure appointment.   Phones are answered Monday - Friday from 08:00 - 4:30pm.  Leave a voicemail with your name, birth date, and phone number if no one is available to take your call.     Your procedure: Cervical Medial Branch Blocks.       If you are having a Diagnostic procedure, you will be given a Pain Diary to document your pain relief.   Please fax the completed form to our office.   fax number is [phone]    If you must reschedule your procedure more than two times, you must follow up in clinic before rescheduling again.        Preparing for your procedure    CAUTION - FAILURE TO FOLLOW THESE PRE-PROCEDURE INSTRUCTIONS WILL RESULT IN YOUR PROCEDURE BEING RESCHEDULED.            You must have a  take you home after your procedure. Transportation by taxi or para-transit is okay as long as you have a responsible adult accompany you. You must provide your 's full name and contact number at time of check in.     Fasting Protocol You may have NOTHING SOLID TO EAT 8 HOURS prior to arrival at the procedure area.     You may have CLEAR LIQUIDS UP TO 2 HOURS prior to arrival.    Broth and candy are considered solid food and require an eight hour fast.     Clear liquids include water, clear fruit juice (no pulp), carbonated beverages, ice, black coffee, black tea, clear jello. No alcohol containing beverages.   Medications If you take any medications, DO NOT STOP. Take your medications as usual the day of your procedure with a sip of water AT  LEAST 2 HOURS PRIOR TO ARRIVAL.    Antibiotics If you are currently taking antibiotics, you must complete the entire dose 7 days prior to your scheduled procedure. You must be clear of any signs or symptoms of infection. If you begin antibiotics, please contact our clinic for instructions.     Fever, Chills, or Rash If you experience a fever of higher than 100 degrees, chills, rash, or open wounds during the one week before your procedure, please call the clinic to see if you may proceed with your procedure.      Medication Hold List  **Patients under Cardiology/Neurology care should consult their provider prior to the pain procedure to verify pre-procedure medication instructions. The information below contains general guidelines.**    Blood Thinners If you are taking daily ASPIRIN, PLAVIX, OR OTHER BLOOD THINNERS SUCH AS COUMADIN/WARFARIN, we will need your prescribing doctor to sign a release permitting you to stop these medications. Once approved by your prescribing doctor - STOP ALL BLOOD THINNERS BASED ON THE TIME TABLE BELOW PRIOR TO YOUR PROCEDURE. If you have been instructed to stop WARFARIN(COUMADIN), you must have an INR lab drawn the day before your procedure. . Your INR must be within normal limits before we can perform your injection. MEDICATIONS CAN BE RESTARTED AFTER YOUR PROCEDURE.    14 DAY HOLD  Ticlid (ticlopidine)    10 DAY HOLD  Effient (Prasugel)    3 DAY HOLD  Xarelto (rivaroxaban) 7 DAY HOLD  Anacin, Bufferin, Ecotrin, Excedrin, Aggrenox (Aspirin)  Brilinta (ticagrelor)  Coumadin (Warfarin)  Pradexa (Dabigatran)  Elmiron (Pentosan)  Plavix (Clopidogrel Bisulfate)  Pletal (Cilostazol)    24 HOUR HOLD  Lovenox (enoxaparin)  Agrylin (Anagrelide)        Non-steroidal Anti-inflammatories (NSAIDs) DO NOT TAKE any non-steroidal anti-inflammatory medications (NSAIDs) listed on the table below. MEDICATIONS CAN BE RESTARTED AFTER YOUR PROCEDURE. Celebrex is OK to take and does not need to be  discontinued.     Medications to stop:  3 DAY HOLD  Advil, Motrin (Ibuprofen)  Arthrotec (diciofenac sodium/misoprostol)  Clinoril (Sulindac)  Indocin (Indomethacin)  Lodine (Etodolac)  Toradol (Ketorolac)  Vicoprofen (Hydrocodone and Ibuprofen)  Voltaren (Diclotenac)    14 DAY HOLD  Daypro (Oxaprozin)  Feldene (Piroxicam)   7 DAY HOLD  Aleve (Naproxen sodium)  Darvon compound (contains aspirin)  Naprosyn (Naproxen)  Norgesic Forte (contains aspirin)  Mobic (Meloxicam)  Oruvall (Ketoprofen)  Percodan (contains aspirin)  Relafen (Nabumetone)  Salsalate  Trilisate  Vitamin E (more than 400 mg per day)  Any medication containing aspirin                To speak with a nurse, schedule/reschedule/cancel a clinic appointment, or request a medication refill call: [phone]     You can also reach us by Tactile: [email or web address]

## 2020-07-09 NOTE — PROGRESS NOTES
LPN called and reviewed pre-procedure instructions with the pt, and the rest of their information on their AVS.  Pt was advised to call and schedule.   Pt denied using Blood thinners, and NSAIDs.     Mya Cash LPN

## 2020-07-10 RX ORDER — OMEPRAZOLE 40 MG/1
40 CAPSULE, DELAYED RELEASE ORAL 2 TIMES DAILY PRN
Qty: 60 CAPSULE | Refills: 1 | OUTPATIENT
Start: 2020-07-10

## 2020-07-14 RX ORDER — OMEPRAZOLE 40 MG/1
40 CAPSULE, DELAYED RELEASE ORAL 2 TIMES DAILY PRN
Qty: 180 CAPSULE | Refills: 3 | Status: SHIPPED | OUTPATIENT
Start: 2020-07-14 | End: 2021-10-15 | Stop reason: ALTCHOICE

## 2020-07-28 ENCOUNTER — TELEPHONE (OUTPATIENT)
Dept: ANESTHESIOLOGY | Facility: CLINIC | Age: 50
End: 2020-07-28

## 2020-07-28 ENCOUNTER — MYC REFILL (OUTPATIENT)
Dept: NEUROLOGY | Facility: CLINIC | Age: 50
End: 2020-07-28

## 2020-07-28 DIAGNOSIS — G89.4 CHRONIC PAIN SYNDROME: ICD-10-CM

## 2020-07-28 DIAGNOSIS — G43.719 INTRACTABLE CHRONIC MIGRAINE WITHOUT AURA AND WITHOUT STATUS MIGRAINOSUS: ICD-10-CM

## 2020-07-28 DIAGNOSIS — M96.1 POSTLAMINECTOMY SYNDROME: ICD-10-CM

## 2020-07-28 RX ORDER — METOCLOPRAMIDE 10 MG/1
10 TABLET ORAL 4 TIMES DAILY PRN
Qty: 40 TABLET | Refills: 3 | Status: SHIPPED | OUTPATIENT
Start: 2020-07-28 | End: 2020-09-02

## 2020-07-28 NOTE — TELEPHONE ENCOUNTER
Refill request    Medication: traMADol (ULTRAM) 50 MG tablet       MNPMP Checked: Yes    Last refilled 06/25/20 for 60 tablets    Refilled due: 07/10/20    Last clinic appointment: 07/09/20  Next clinic appointment: 10/08/20      Preferred pharmacy:    [place], [place]      Refill request    Medication: buprenorphine (BUTRANS) 10 MCG/HR WK patch       MNPMP Checked: Yes    Last refilled 06/25/20  for 4 tablets    Refilled due: 07/23/20    Last clinic appointment: 07/09/20  Next clinic appointment: 10/08/20      Preferred pharmacy:    [place], [place]

## 2020-07-28 NOTE — TELEPHONE ENCOUNTER
Rx Authorization:    Requested Medication/ Dose: Metoclopramide (Reglan) 10MG tabs    Date last refill ordered: 4/21/20    Quantity ordered: 40 tabs    # refills: 3    Date of last clinic visit with ordering provider: 5/19/20    Date of next clinic visit with ordering provider: 10/8/20    All pertinent protocol data (lab date/result):     Include pertinent information from patients message:

## 2020-07-31 RX ORDER — BUPRENORPHINE 10 UG/H
1 PATCH TRANSDERMAL WEEKLY
Qty: 4 PATCH | Refills: 0 | Status: SHIPPED | OUTPATIENT
Start: 2020-07-31 | End: 2020-09-04

## 2020-07-31 RX ORDER — TRAMADOL HYDROCHLORIDE 50 MG/1
50 TABLET ORAL EVERY 6 HOURS PRN
Qty: 60 TABLET | Refills: 0 | Status: SHIPPED | OUTPATIENT
Start: 2020-07-31 | End: 2020-09-02

## 2020-09-02 ENCOUNTER — MYC REFILL (OUTPATIENT)
Dept: NEUROLOGY | Facility: CLINIC | Age: 50
End: 2020-09-02

## 2020-09-02 ENCOUNTER — MYC REFILL (OUTPATIENT)
Dept: INTERNAL MEDICINE | Facility: CLINIC | Age: 50
End: 2020-09-02

## 2020-09-02 ENCOUNTER — MYC REFILL (OUTPATIENT)
Dept: CARDIOLOGY | Facility: CLINIC | Age: 50
End: 2020-09-02

## 2020-09-02 ENCOUNTER — TELEPHONE (OUTPATIENT)
Dept: ANESTHESIOLOGY | Facility: CLINIC | Age: 50
End: 2020-09-02

## 2020-09-02 DIAGNOSIS — E03.9 HYPOTHYROIDISM, UNSPECIFIED TYPE: ICD-10-CM

## 2020-09-02 DIAGNOSIS — K44.9 HIATAL HERNIA: ICD-10-CM

## 2020-09-02 DIAGNOSIS — G90.A POTS (POSTURAL ORTHOSTATIC TACHYCARDIA SYNDROME): ICD-10-CM

## 2020-09-02 DIAGNOSIS — D47.09 MAST CELL DISORDER: ICD-10-CM

## 2020-09-02 DIAGNOSIS — G90.9 AUTONOMIC DYSFUNCTION: ICD-10-CM

## 2020-09-02 DIAGNOSIS — M96.1 POSTLAMINECTOMY SYNDROME: ICD-10-CM

## 2020-09-02 DIAGNOSIS — G89.4 CHRONIC PAIN SYNDROME: ICD-10-CM

## 2020-09-02 DIAGNOSIS — Z11.59 ENCOUNTER FOR SCREENING FOR OTHER VIRAL DISEASES: Primary | ICD-10-CM

## 2020-09-02 DIAGNOSIS — G43.719 INTRACTABLE CHRONIC MIGRAINE WITHOUT AURA AND WITHOUT STATUS MIGRAINOSUS: ICD-10-CM

## 2020-09-02 DIAGNOSIS — K20.0 EOSINOPHILIC ESOPHAGITIS: ICD-10-CM

## 2020-09-02 PROBLEM — M47.812 FACET ARTHROPATHY, CERVICAL: Status: ACTIVE | Noted: 2020-09-02

## 2020-09-02 RX ORDER — METOCLOPRAMIDE 10 MG/1
10 TABLET ORAL 4 TIMES DAILY PRN
Qty: 40 TABLET | Refills: 3 | Status: SHIPPED | OUTPATIENT
Start: 2020-09-02 | End: 2021-12-21

## 2020-09-02 RX ORDER — CEFUROXIME AXETIL 250 MG/1
TABLET ORAL
Qty: 2 KIT | Refills: 11 | Status: SHIPPED | OUTPATIENT
Start: 2020-09-02 | End: 2022-05-17

## 2020-09-02 RX ORDER — OMEPRAZOLE 40 MG/1
40 CAPSULE, DELAYED RELEASE ORAL 2 TIMES DAILY PRN
Qty: 180 CAPSULE | Refills: 3 | Status: CANCELLED | OUTPATIENT
Start: 2020-09-02

## 2020-09-02 RX ORDER — RIZATRIPTAN BENZOATE 5 MG/1
TABLET, ORALLY DISINTEGRATING ORAL
Qty: 18 TABLET | Refills: 11 | Status: SHIPPED | OUTPATIENT
Start: 2020-09-02 | End: 2022-05-17

## 2020-09-02 RX ORDER — IVABRADINE 7.5 MG/1
7.5 TABLET, FILM COATED ORAL 2 TIMES DAILY WITH MEALS
Qty: 180 TABLET | Refills: 3 | Status: CANCELLED | OUTPATIENT
Start: 2020-09-02

## 2020-09-02 RX ORDER — PROPRANOLOL HYDROCHLORIDE 120 MG/1
120 CAPSULE, EXTENDED RELEASE ORAL DAILY
Qty: 30 CAPSULE | Refills: 11 | Status: SHIPPED | OUTPATIENT
Start: 2020-09-02 | End: 2021-11-29

## 2020-09-02 RX ORDER — TRAMADOL HYDROCHLORIDE 50 MG/1
50 TABLET ORAL EVERY 6 HOURS PRN
Qty: 60 TABLET | Refills: 0 | Status: SHIPPED | OUTPATIENT
Start: 2020-09-02 | End: 2020-10-06

## 2020-09-02 NOTE — TELEPHONE ENCOUNTER
Rx Authorization:    Requested Medication/ Dose: Propranolo ER (Inderall LA) 10MG 24HR Cap    Date last refill ordered: 7/6/20    Quantity ordered: 30 caps    # refills: 11    Date of last clinic visit with ordering provider: 4/21/20    Date of next clinic visit with ordering provider: 10/8/20    All pertinent protocol data (lab date/result):     Include pertinent information from patients message:

## 2020-09-02 NOTE — TELEPHONE ENCOUNTER
Rx Authorization:    Requested Medication/ Dose: Rizatriptan (Maxalt    Date last refill ordered: 10/23/19    Quantity ordered: 18 tabas    # refills: 11    Date of last clinic visit with ordering provider: 5/19/20    Date of next clinic visit with ordering provider: 10/8/20    All pertinent protocol data (lab date/result):     Include pertinent information from patients message:

## 2020-09-02 NOTE — TELEPHONE ENCOUNTER
Refill request    Medication: buprenorphine (BUTRANS) 10 MCG/HR WK patch      MNPMP Checked: Yes    Last refilled 07/31/20 for 4 patches      Last clinic appointment: 07/09/20  Next clinic appointment: 10/08/20      Preferred pharmacy:    [facility] [place] [address]      Refill request    Medication: traMADol (ULTRAM) 50 MG tablet      MNPMP Checked: Yes    Last refilled 07/31/20 for 60 tablets      Last clinic appointment: 0709/20  Next clinic appointment: 10/08/20      Preferred pharmacy:    [facility] [address]

## 2020-09-04 RX ORDER — BUPRENORPHINE 10 UG/H
1 PATCH TRANSDERMAL WEEKLY
Qty: 4 PATCH | Refills: 0 | Status: SHIPPED | OUTPATIENT
Start: 2020-09-04 | End: 2020-10-08

## 2020-09-04 NOTE — TELEPHONE ENCOUNTER
Dr. Martinez gave LPN verbal orders to re-order Butrans patch.  Medication was called into the pt's Pharmacy by MEIR.    Mya Cash LPN

## 2020-09-08 RX ORDER — LEVOTHYROXINE SODIUM 25 UG/1
25 TABLET ORAL DAILY
Qty: 90 TABLET | Refills: 0 | OUTPATIENT
Start: 2020-09-08

## 2020-09-08 RX ORDER — FAMOTIDINE 20 MG/1
20 TABLET, FILM COATED ORAL AT BEDTIME
Qty: 90 TABLET | Refills: 2 | Status: SHIPPED | OUTPATIENT
Start: 2020-09-08 | End: 2021-02-22

## 2020-09-08 NOTE — TELEPHONE ENCOUNTER
Omeprazole has order:   180 capsule  3  7/14/2020     Last Clinic Visit: 4/24/2020  [facility]      Kathleen M Doege RN

## 2020-09-17 ENCOUNTER — VIRTUAL VISIT (OUTPATIENT)
Dept: CARDIOLOGY | Facility: CLINIC | Age: 50
End: 2020-09-17
Attending: INTERNAL MEDICINE
Payer: COMMERCIAL

## 2020-09-17 DIAGNOSIS — G90.9 AUTONOMIC DYSFUNCTION: Primary | ICD-10-CM

## 2020-09-17 PROCEDURE — 99213 OFFICE O/P EST LOW 20 MIN: CPT | Mod: 95 | Performed by: INTERNAL MEDICINE

## 2020-09-17 NOTE — LETTER
"9/17/2020      RE: Ada Welch  [address]  [facility] [address]       Dear Colleague,    Thank you for the opportunity to participate in the care of your patient, Ada Welch, at the [facility]. Please see a copy of my visit note below.    Ada Welch is a 49 year old female who is being evaluated via a billable telephone visit.      The patient has been notified of following:     \"This telephone visit will be conducted via a call between you and your physician/provider. We have found that certain health care needs can be provided without the need for a physical exam.  This service lets us provide the care you need with a short phone conversation.  If a prescription is necessary we can send it directly to your pharmacy.  If lab work is needed we can place an order for that and you can then stop by our lab to have the test done at a later time.    If during the course of the call the physician/provider feels a telephone visit is not appropriate, you will not be charged for this service.\"    Patient has given verbal consent for Telephone visit?  Yes    What phone number would you like to be contacted at? [phone]    How would you like to obtain your AVS? Mail a copy  HPI:   HPI:   Ms. Scar Welch is a 49 year-old female who presents  for follow up of autonomic dysfuction.  The patient has a past medical history significant for dysautonomia and Tom-Danlos.      Since last visit, sx have been stable. Pt will still have episodes of heart rate \"spiking\" while doing household chores but this is occurring less frequently.     Pt will also sometimes wake up in the morning with a low HR (in the 40s) and this is associated with some lightheadedness sensation. When her HR is this low, pt feels very lethargic and sleepy. These sx can last an entire day.  She attributes the symptoms to low heart rate and low blood pressure     Ada is " currently taking propranolol  qday as well as ivabradine 7.5 mg twice daily.    Generally speaking she is doing well but we might be able to improve symptoms status by reducing the ivabradine in the evening to half a tablet.    We will reassess in 5 or 6 months.    PAST MEDICAL HISTORY:  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     Cervicalgia      Chronic sinusitis 00/2007    Diagnosed with chronic pansinusitis 2016     CSF leak     Chiari malformation     Depression      Tom-Danlos disease      Eosinophilic esophagitis 08/2018     Gastroesophageal reflux disease 3/1/2017    I have large hiatal hernia     Hiatal hernia 2016    Have adeline hiatel hernia     Hoarseness Unsure    It comes and goes     Hypothyroid      IBS (irritable bowel syndrome) with constipation     improved on Linzess     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     Migraines 1/1/2007    Unsure of exact date     Nasal polyps 2013    Were revoved 03/2017, benign     Orthostatic hypotension      Other nervous system complications 1/2014    Autonomic nervous system disorder, neuralgia, neuropathy     Swelling, mass, or lump in head and neck 08/2016    Lymph node has been growing for last year     Thyroid disease 01/01/2008     Urinary incontinence      Urinary urgency        CURRENT MEDICATIONS:  Current Outpatient Medications   Medication Sig Dispense Refill     albuterol (PROAIR HFA/PROVENTIL HFA/VENTOLIN HFA) 108 (90 BASE) MCG/ACT Inhaler Inhale 2 puffs into the lungs every 6 hours as needed        amitriptyline (ELAVIL) 10 MG tablet Take 1 tablet (10 mg) by mouth At Bedtime 60 tablet 1     buprenorphine  (BUTRANS) 10 MCG/HR WK patch Place 1 patch onto the skin once a week 4 patch 0     butalbital-acetaminophen-caffeine (ESGIC) -40 MG tablet TAKE ONE TABLET BY MOUTH EVERY 4 HOURS AS NEEDED. MAX 10 PER MONTH. 10 tablet 0     cetirizine (ZYRTEC) 10 MG tablet Take 10 mg by mouth daily as needed        diclofenac (VOLTAREN) 1 % topical gel Place 2 g onto the skin 4 times daily 1 Tube 0     DULoxetine (CYMBALTA) 30 MG capsule Take 60mg in the morning and 30mg at bedtime 270 capsule 3     EPINEPHrine (EPIPEN/ADRENACLICK/OR ANY BX GENERIC EQUIV) 0.3 MG/0.3ML injection 2-pack INJECT 0.3 MLS (0.3 MG) INTO THE MUSCLE AS NEEDED FOR ANAPHYLAXIS  1     famotidine (PEPCID) 20 MG tablet Take 1 tablet (20 mg) by mouth At Bedtime 90 tablet 2     ferrous fumarate 65 mg, [place]. FE,-Vitamin C 125 mg (VITRON-C)  MG TABS tablet Take 1 tablet by mouth every other day 60 tablet 1     fluticasone (FLONASE) 50 MCG/ACT nasal spray INSTILL 2 SPRAYS INTO BOTH NOSTRILS DAILY 48 mL 3     ivabradine (CORLANOR) 7.5 MG tablet Take 1 tablet (7.5 mg) by mouth 2 times daily (with meals) 180 tablet 3     levothyroxine (SYNTHROID/LEVOTHROID) 25 MCG tablet TAKE 1 TABLET BY MOUTH EVERY DAY 30 tablet 0     Lidocaine (LIDOCARE) 4 % Patch Place 1-3 patches onto the skin every 24 hours To prevent lidocaine toxicity, patient should be patch free for 12 hrs daily. 20 patch 0     linaclotide (LINZESS) 145 MCG capsule Take 1 capsule (145 mcg) by mouth every morning (before breakfast) 90 capsule 3     medical cannabis (Patient's own supply.  Not a prescription) Take 1 Dose by mouth See Admin Instructions (This is NOT a prescription, and does not certify that the patient has a qualifying medical condition for medical cannabis.  The purpose of this order is  to document that the patient reports taking medical cannabis.)     Capsule formulation - uses as needed (currently out of this and not using as of January 28, 2020)       metoclopramide (REGLAN) 10 MG  tablet Take 1 tablet (10 mg) by mouth 4 times daily as needed (headache) 40 tablet 3     Multiple Vitamins-Minerals (MULTIVITAMIN PO) Take 1 tablet by mouth daily        naloxone (NARCAN) 4 MG/0.1ML nasal spray Spray 1 spray (4 mg) into one nostril alternating nostrils once as needed for opioid reversal every 2-3 minutes until assistance arrives 0.2 mL 0     naproxen sodium (ANAPROX) 220 MG tablet Take 440-660 mg by mouth daily as needed for moderate pain       omeprazole (PRILOSEC) 40 MG DR capsule Take 1 capsule (40 mg) by mouth 2 times daily as needed (prn) 180 capsule 3     ondansetron (ZOFRAN-ODT) 4 MG ODT tab Take 1-2 tablets (4-8 mg) by mouth every 12 hours as needed for nausea 60 tablet 1     Probiotic Product (PROBIOTIC DAILY PO) Take 2 packets by mouth every morning        propranolol ER (INDERAL LA) 120 MG 24 hr capsule Take 1 capsule (120 mg) by mouth daily 30 capsule 11     rizatriptan (MAXALT-MLT) 5 MG ODT TAKE 1-2 TABLETS (5-10 MG) BY MOUTH AT ONSET OF HEADACHE FOR MIGRAINE (MAX 30 MG IN 24 HOURS) 18 tablet 11     SUMAtriptan (IMITREX STATDOSE) 6 MG/0.5ML pen injector kit Inject 0.5 ml (6 mg) subcutaneously at onset of migraine, May repeat in 1 hour , Max 12 mg/24 hrs 2 kit 11     tiZANidine (ZANAFLEX) 4 MG tablet Take 1 tablet (4 mg) by mouth 4 times daily as needed for muscle spasms 360 tablet 5     traMADol (ULTRAM) 50 MG tablet Take 1 tablet (50 mg) by mouth every 6 hours as needed for severe pain 60 tablet 0     ubrogepant (UBRELVY) 100 MG tablet Take 1 tablet (100 mg) by mouth at onset of headache (headache) 10 tablet 5     vitamin B complex with vitamin C (VITAMIN  B COMPLEX) TABS tablet Take 1 tablet by mouth daily       VITAMIN D, CHOLECALCIFEROL, PO Take 2,000 Units by mouth daily       traMADol (ULTRAM) 50 MG tablet Take 1 tablet (50 mg) by mouth every 6 hours as needed for severe pain 60 tablet 0       PAST SURGICAL HISTORY:  Past Surgical History:   Procedure Laterality Date     AS REPAIR  OF NASAL SEPTUM  2009    repair broke nose     BACK SURGERY  12/09/2013  10/01/2014    Spinal fusion L4-S1, L5 moving 5/8ths in. Remove screws/rods     BIOPSY  01/01/2008 & 3/2017    mole biopsy, lymph node biopsy     COLONOSCOPY  3/2017    Colonoscopy and GI     ESOPHAGEAL IMPEDENCE FUNCTION TEST WITH 24 HOUR PH GREATER THAN 1 HOUR N/A 9/17/2019    Procedure: IMPEDANCE PH STUDY, ESOPHAGUS, 24 HOUR;  Surgeon: Raghavendra Grande MD;  Location:  GI     ESOPHAGOSCOPY, GASTROSCOPY, DUODENOSCOPY (EGD), COMBINED N/A 8/3/2018    Procedure: COMBINED ESOPHAGOSCOPY, GASTROSCOPY, DUODENOSCOPY (EGD), BIOPSY SINGLE OR MULTIPLE;;  Surgeon: Juan Woodson MD;  Location:  GI     ESOPHAGOSCOPY, GASTROSCOPY, DUODENOSCOPY (EGD), COMBINED N/A 10/22/2018    Procedure: COMBINED ESOPHAGOSCOPY, GASTROSCOPY, DUODENOSCOPY (EGD), BIOPSY SINGLE OR MULTIPLE;  Surgeon: Sadia Carolina MD;  Location:  GI     ESOPHAGOSCOPY, GASTROSCOPY, DUODENOSCOPY (EGD), COMBINED N/A 12/17/2018    Procedure: COMBINED ESOPHAGOSCOPY, GASTROSCOPY, DUODENOSCOPY (EGD);  Surgeon: Juan Woodson MD;  Location: [facility]     LAPAROSCOPIC HERNIORRHAPHY HIATAL N/A 2/10/2020    Procedure: LAPAROSCOPIC HIATAL HERNIA REPAIR, NISSEN FUNDOPLICATION, ESOPHOGASTRODUODENOSCOPY, PEG TUBE PLACEMENT.;  Surgeon: Alana Mack MD;  Location:  OR     NASAL/SINUS POLYPECTOMY  2017    Polyps were benign     NOSE SURGERY  2007    deviated septum     TONSILLECTOMY & ADENOIDECTOMY  1981     TUBAL LIGATION  07/01/2011       ALLERGIES:     Allergies   Allergen Reactions     Bee Venom Shortness Of Breath, Palpitations, Anaphylaxis and Difficulty breathing     Patient does carry Epi-Pen     Chicken-Derived Products (Egg) Nausea and Diarrhea     Compazine [Prochlorperazine]      Extreme jittery     Food      Milk     Gabapentin      Gluten Meal GI Disturbance     Wheat bran and wheat     Pregabalin      Seasonal Allergies      Dust, mold     Topiramate        FAMILY HISTORY:  Family  History   Problem Relation Age of Onset     Connective Tissue Disorder Mother         eds     Connective Tissue Disorder Sister         eds     Cancer Father         Spinal tumor grew into spinal cord, went in brain     Migraines Father      Diabetes Maternal Grandmother         Elderly diabetes     Heart Disease Maternal Grandmother      Hypertension Maternal Grandmother      Diabetes Paternal Grandmother         Elderly diabetes     Diabetes Paternal Grandfather         Elderly diabetes     Asthma Sister         Pediatric Asthma     Migraines Sister      Asthma Son         Pediatric Asthma     Thyroid Disease Sister         ,     Migraines Sister      Migraines Sister      Breast Cancer No family hx of          SOCIAL HISTORY:  Social History     Tobacco Use     Smoking status: Former Smoker     Packs/day: 0.20     Years: 10.00     Pack years: 2.00     Types: Cigarettes     Start date: 1991     Last attempt to quit: 2013     Years since quittin.8     Smokeless tobacco: Never Used   Substance Use Topics     Alcohol use: Yes     Comment: occasional - 4 times a year      Drug use: Not Currently     Types: Marijuana     Comment: medical marijuana       ROS:   Constitutional: No fever, chills, or sweats. Weight stable.   ENT: No visual disturbance, ear ache, epistaxis, sore throat.   Cardiovascular: As per HPI.   Respiratory: No cough, hemoptysis.    GI: No nausea, vomiting, hematemesis, melena, or hematochezia.   : No hematuria.   Integument: Negative.   Psychiatric: Negative.   Hematologic:  Easy bruising, no easy bleeding.  Neuro: Negative.   Endocrinology: No significant heat or cold intolerance   Musculoskeletal: No myalgia.    Exam:    RESPIRATORY:no rales, rhonchi or wheezes, no use of accessory muscles, no retractions, respirations are unlabored, normal respiratory rate    NEURO: alert and oriented to person/place/time, normal speech, gait and affect    Labs:  CBC RESULTS:   Lab Results    Component Value Date    WBC 15.4 (H) 02/11/2020    RBC 3.82 02/11/2020    HGB 10.1 (L) 02/11/2020    HCT 33.0 (L) 02/11/2020    MCV 86 02/11/2020    MCH 26.4 (L) 02/11/2020    MCHC 30.6 (L) 02/11/2020    RDW 14.8 02/11/2020     02/13/2020       BMP RESULTS:  Lab Results   Component Value Date     02/12/2020    POTASSIUM 3.6 02/12/2020    CHLORIDE 106 02/12/2020    CO2 27 02/12/2020    ANIONGAP 6 02/12/2020     (H) 02/12/2020    BUN 6 (L) 02/12/2020    CR 0.62 02/12/2020    GFRESTIMATED >90 02/12/2020    GFRESTBLACK >90 02/12/2020    JUAN 8.0 (L) 02/12/2020        INR RESULTS:  Lab Results   Component Value Date    INR 0.93 01/30/2020    INR 0.97 02/28/2019    INR 0.90 12/17/2018    INR 0.96 07/06/2018       Procedures:  PULMONARY FUNCTION TESTS:   No flowsheet data found.      ECHOCARDIOGRAM:   No results found for this or any previous visit (from the past 8760 hour(s)).      Assessment and Plan:   1.  Autonomic dysfunction with variable heart rates and periods of lightheadedness.  Some improvement on current therapy    Plan  1.  Continue current salt and volume supplementation  2.  Reduce ivabradine to seven-point 5 in the morning and half tablet in the evening  3.  Reassess in clinic 5 to 6 months time    Telephone on 3: 32pm; telephone off 3: 50    I very much appreciated the opportunity to  assess Ada Welch in the clinic today. Please do not hesitate to contact my office if you have any questions or concerns.      Kirk Russell MD  Cardiac Arrhythmia Service  [facility]  [phone]      Please do not hesitate to contact me if you have any questions/concerns.     Sincerely,     Kirk Russell MD

## 2020-09-17 NOTE — PROGRESS NOTES
"Ada Welch is a 49 year old female who is being evaluated via a billable telephone visit.      The patient has been notified of following:     \"This telephone visit will be conducted via a call between you and your physician/provider. We have found that certain health care needs can be provided without the need for a physical exam.  This service lets us provide the care you need with a short phone conversation.  If a prescription is necessary we can send it directly to your pharmacy.  If lab work is needed we can place an order for that and you can then stop by our lab to have the test done at a later time.    If during the course of the call the physician/provider feels a telephone visit is not appropriate, you will not be charged for this service.\"    Patient has given verbal consent for Telephone visit?  Yes    What phone number would you like to be contacted at? [phone]    How would you like to obtain your AVS? Mail a copy  HPI:   HPI:   Ms. Scar Welch is a 49 year-old female who presents  for follow up of autonomic dysfuction.  The patient has a past medical history significant for dysautonomia and Tom-Danlos.      Since last visit, sx have been stable. Pt will still have episodes of heart rate \"spiking\" while doing household chores but this is occurring less frequently.     Pt will also sometimes wake up in the morning with a low HR (in the 40s) and this is associated with some lightheadedness sensation. When her HR is this low, pt feels very lethargic and sleepy. These sx can last an entire day.  She attributes the symptoms to low heart rate and low blood pressure     Ada is currently taking propranolol  qday as well as ivabradine 7.5 mg twice daily.    Generally speaking she is doing well but we might be able to improve symptoms status by reducing the ivabradine in the evening to half a tablet.    We will reassess in 5 or 6 months.    PAST MEDICAL HISTORY:  Past Medical History: "   Diagnosis Date     Allergic rhinitis 09/2007     Anxiety      Arthritis 11/01/2013    Found during search for cause of back pain     Autoimmune disease (H) 2016    Immunosuppresive     Autonomic dysfunction      Bleeding disorder (H) 2016    Bruise easily     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     Cervicalgia      Chronic sinusitis 00/2007    Diagnosed with chronic pansinusitis 2016     CSF leak     Chiari malformation     Depression      Tom-Danlos disease      Eosinophilic esophagitis 08/2018     Gastroesophageal reflux disease 3/1/2017    I have large hiatal hernia     Hiatal hernia 2016    Have adeline hiatel hernia     Hoarseness Unsure    It comes and goes     Hypothyroid      IBS (irritable bowel syndrome) with constipation     improved on Linzess     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     Migraines 1/1/2007    Unsure of exact date     Nasal polyps 2013    Were revoved 03/2017, benign     Orthostatic hypotension      Other nervous system complications 1/2014    Autonomic nervous system disorder, neuralgia, neuropathy     Swelling, mass, or lump in head and neck 08/2016    Lymph node has been growing for last year     Thyroid disease 01/01/2008     Urinary incontinence      Urinary urgency        CURRENT MEDICATIONS:  Current Outpatient Medications   Medication Sig Dispense Refill     albuterol (PROAIR HFA/PROVENTIL HFA/VENTOLIN HFA) 108 (90 BASE) MCG/ACT Inhaler Inhale 2 puffs into the lungs every 6 hours as needed        amitriptyline (ELAVIL) 10 MG tablet Take 1 tablet (10 mg) by mouth At Bedtime 60 tablet 1     buprenorphine (BUTRANS) 10 MCG/HR WK patch Place 1 patch onto the skin once a week 4 patch 0     butalbital-acetaminophen-caffeine (ESGIC) -40 MG tablet TAKE ONE TABLET BY MOUTH EVERY 4 HOURS AS NEEDED. MAX 10 PER MONTH. 10 tablet 0     cetirizine (ZYRTEC) 10 MG tablet Take 10 mg by mouth daily as needed        diclofenac  (VOLTAREN) 1 % topical gel Place 2 g onto the skin 4 times daily 1 Tube 0     DULoxetine (CYMBALTA) 30 MG capsule Take 60mg in the morning and 30mg at bedtime 270 capsule 3     EPINEPHrine (EPIPEN/ADRENACLICK/OR ANY BX GENERIC EQUIV) 0.3 MG/0.3ML injection 2-pack INJECT 0.3 MLS (0.3 MG) INTO THE MUSCLE AS NEEDED FOR ANAPHYLAXIS  1     famotidine (PEPCID) 20 MG tablet Take 1 tablet (20 mg) by mouth At Bedtime 90 tablet 2     ferrous fumarate 65 mg, [place]. FE,-Vitamin C 125 mg (VITRON-C)  MG TABS tablet Take 1 tablet by mouth every other day 60 tablet 1     fluticasone (FLONASE) 50 MCG/ACT nasal spray INSTILL 2 SPRAYS INTO BOTH NOSTRILS DAILY 48 mL 3     ivabradine (CORLANOR) 7.5 MG tablet Take 1 tablet (7.5 mg) by mouth 2 times daily (with meals) 180 tablet 3     levothyroxine (SYNTHROID/LEVOTHROID) 25 MCG tablet TAKE 1 TABLET BY MOUTH EVERY DAY 30 tablet 0     Lidocaine (LIDOCARE) 4 % Patch Place 1-3 patches onto the skin every 24 hours To prevent lidocaine toxicity, patient should be patch free for 12 hrs daily. 20 patch 0     linaclotide (LINZESS) 145 MCG capsule Take 1 capsule (145 mcg) by mouth every morning (before breakfast) 90 capsule 3     medical cannabis (Patient's own supply.  Not a prescription) Take 1 Dose by mouth See Admin Instructions (This is NOT a prescription, and does not certify that the patient has a qualifying medical condition for medical cannabis.  The purpose of this order is  to document that the patient reports taking medical cannabis.)     Capsule formulation - uses as needed (currently out of this and not using as of January 28, 2020)       metoclopramide (REGLAN) 10 MG tablet Take 1 tablet (10 mg) by mouth 4 times daily as needed (headache) 40 tablet 3     Multiple Vitamins-Minerals (MULTIVITAMIN PO) Take 1 tablet by mouth daily        naloxone (NARCAN) 4 MG/0.1ML nasal spray Spray 1 spray (4 mg) into one nostril alternating nostrils once as needed for opioid reversal every 2-3  minutes until assistance arrives 0.2 mL 0     naproxen sodium (ANAPROX) 220 MG tablet Take 440-660 mg by mouth daily as needed for moderate pain       omeprazole (PRILOSEC) 40 MG DR capsule Take 1 capsule (40 mg) by mouth 2 times daily as needed (prn) 180 capsule 3     ondansetron (ZOFRAN-ODT) 4 MG ODT tab Take 1-2 tablets (4-8 mg) by mouth every 12 hours as needed for nausea 60 tablet 1     Probiotic Product (PROBIOTIC DAILY PO) Take 2 packets by mouth every morning        propranolol ER (INDERAL LA) 120 MG 24 hr capsule Take 1 capsule (120 mg) by mouth daily 30 capsule 11     rizatriptan (MAXALT-MLT) 5 MG ODT TAKE 1-2 TABLETS (5-10 MG) BY MOUTH AT ONSET OF HEADACHE FOR MIGRAINE (MAX 30 MG IN 24 HOURS) 18 tablet 11     SUMAtriptan (IMITREX STATDOSE) 6 MG/0.5ML pen injector kit Inject 0.5 ml (6 mg) subcutaneously at onset of migraine, May repeat in 1 hour , Max 12 mg/24 hrs 2 kit 11     tiZANidine (ZANAFLEX) 4 MG tablet Take 1 tablet (4 mg) by mouth 4 times daily as needed for muscle spasms 360 tablet 5     traMADol (ULTRAM) 50 MG tablet Take 1 tablet (50 mg) by mouth every 6 hours as needed for severe pain 60 tablet 0     ubrogepant (UBRELVY) 100 MG tablet Take 1 tablet (100 mg) by mouth at onset of headache (headache) 10 tablet 5     vitamin B complex with vitamin C (VITAMIN  B COMPLEX) TABS tablet Take 1 tablet by mouth daily       VITAMIN D, CHOLECALCIFEROL, PO Take 2,000 Units by mouth daily       traMADol (ULTRAM) 50 MG tablet Take 1 tablet (50 mg) by mouth every 6 hours as needed for severe pain 60 tablet 0       PAST SURGICAL HISTORY:  Past Surgical History:   Procedure Laterality Date     AS REPAIR OF NASAL SEPTUM  2009    repair broke nose     BACK SURGERY  12/09/2013  10/01/2014    Spinal fusion L4-S1, L5 moving 5/8ths in. Remove screws/rods     BIOPSY  01/01/2008 & 3/2017    mole biopsy, lymph node biopsy     COLONOSCOPY  3/2017    Colonoscopy and GI     ESOPHAGEAL IMPEDENCE FUNCTION TEST WITH 24 HOUR PH  GREATER THAN 1 HOUR N/A 9/17/2019    Procedure: IMPEDANCE PH STUDY, ESOPHAGUS, 24 HOUR;  Surgeon: Raghavendra Grande MD;  Location:  GI     ESOPHAGOSCOPY, GASTROSCOPY, DUODENOSCOPY (EGD), COMBINED N/A 8/3/2018    Procedure: COMBINED ESOPHAGOSCOPY, GASTROSCOPY, DUODENOSCOPY (EGD), BIOPSY SINGLE OR MULTIPLE;;  Surgeon: Juan Woodson MD;  Location: [facility]     ESOPHAGOSCOPY, GASTROSCOPY, DUODENOSCOPY (EGD), COMBINED N/A 10/22/2018    Procedure: COMBINED ESOPHAGOSCOPY, GASTROSCOPY, DUODENOSCOPY (EGD), BIOPSY SINGLE OR MULTIPLE;  Surgeon: Sadia Carolina MD;  Location:  GI     ESOPHAGOSCOPY, GASTROSCOPY, DUODENOSCOPY (EGD), COMBINED N/A 12/17/2018    Procedure: COMBINED ESOPHAGOSCOPY, GASTROSCOPY, DUODENOSCOPY (EGD);  Surgeon: Juan Woodson MD;  Location:  GI     LAPAROSCOPIC HERNIORRHAPHY HIATAL N/A 2/10/2020    Procedure: LAPAROSCOPIC HIATAL HERNIA REPAIR, NISSEN FUNDOPLICATION, ESOPHOGASTRODUODENOSCOPY, PEG TUBE PLACEMENT.;  Surgeon: Alana Mack MD;  Location:  OR     NASAL/SINUS POLYPECTOMY  2017    Polyps were benign     NOSE SURGERY  2007    deviated septum     TONSILLECTOMY & ADENOIDECTOMY  1981     TUBAL LIGATION  07/01/2011       ALLERGIES:     Allergies   Allergen Reactions     Bee Venom Shortness Of Breath, Palpitations, Anaphylaxis and Difficulty breathing     Patient does carry Epi-Pen     Chicken-Derived Products (Egg) Nausea and Diarrhea     Compazine [Prochlorperazine]      Extreme jittery     Food      Milk     Gabapentin      Gluten Meal GI Disturbance     Wheat bran and wheat     Pregabalin      Seasonal Allergies      Dust, mold     Topiramate        FAMILY HISTORY:  Family History   Problem Relation Age of Onset     Connective Tissue Disorder Mother         eds     Connective Tissue Disorder Sister         eds     Cancer Father         Spinal tumor grew into spinal cord, went in brain     Migraines Father      Diabetes Maternal Grandmother         Elderly diabetes     Heart Disease  Maternal Grandmother      Hypertension Maternal Grandmother      Diabetes Paternal Grandmother         Elderly diabetes     Diabetes Paternal Grandfather         Elderly diabetes     Asthma Sister         Pediatric Asthma     Migraines Sister      Asthma Son         Pediatric Asthma     Thyroid Disease Sister         ,     Migraines Sister      Migraines Sister      Breast Cancer No family hx of          SOCIAL HISTORY:  Social History     Tobacco Use     Smoking status: Former Smoker     Packs/day: 0.20     Years: 10.00     Pack years: 2.00     Types: Cigarettes     Start date: 1991     Last attempt to quit: 2013     Years since quittin.8     Smokeless tobacco: Never Used   Substance Use Topics     Alcohol use: Yes     Comment: occasional - 4 times a year      Drug use: Not Currently     Types: Marijuana     Comment: medical marijuana       ROS:   Constitutional: No fever, chills, or sweats. Weight stable.   ENT: No visual disturbance, ear ache, epistaxis, sore throat.   Cardiovascular: As per HPI.   Respiratory: No cough, hemoptysis.    GI: No nausea, vomiting, hematemesis, melena, or hematochezia.   : No hematuria.   Integument: Negative.   Psychiatric: Negative.   Hematologic:  Easy bruising, no easy bleeding.  Neuro: Negative.   Endocrinology: No significant heat or cold intolerance   Musculoskeletal: No myalgia.    Exam:    RESPIRATORY:no rales, rhonchi or wheezes, no use of accessory muscles, no retractions, respirations are unlabored, normal respiratory rate    NEURO: alert and oriented to person/place/time, normal speech, gait and affect    Labs:  CBC RESULTS:   Lab Results   Component Value Date    WBC 15.4 (H) 2020    RBC 3.82 2020    HGB 10.1 (L) 2020    HCT 33.0 (L) 2020    MCV 86 2020    MCH 26.4 (L) 2020    MCHC 30.6 (L) 2020    RDW 14.8 2020     2020       BMP RESULTS:  Lab Results   Component Value Date     2020     POTASSIUM 3.6 02/12/2020    CHLORIDE 106 02/12/2020    CO2 27 02/12/2020    ANIONGAP 6 02/12/2020     (H) 02/12/2020    BUN 6 (L) 02/12/2020    CR 0.62 02/12/2020    GFRESTIMATED >90 02/12/2020    GFRESTBLACK >90 02/12/2020    JUAN 8.0 (L) 02/12/2020        INR RESULTS:  Lab Results   Component Value Date    INR 0.93 01/30/2020    INR 0.97 02/28/2019    INR 0.90 12/17/2018    INR 0.96 07/06/2018       Procedures:  PULMONARY FUNCTION TESTS:   No flowsheet data found.      ECHOCARDIOGRAM:   No results found for this or any previous visit (from the past 8760 hour(s)).      Assessment and Plan:   1.  Autonomic dysfunction with variable heart rates and periods of lightheadedness.  Some improvement on current therapy    Plan  1.  Continue current salt and volume supplementation  2.  Reduce ivabradine to seven-point 5 in the morning and half tablet in the evening  3.  Reassess in clinic 5 to 6 months time    Telephone on 3: 32pm; telephone off 3: 50    I very much appreciated the opportunity to  assess Ada Welch in the clinic today. Please do not hesitate to contact my office if you have any questions or concerns.      Kirk Russell MD  Cardiac Arrhythmia Service  [facility]  [phone]      CC  SELF, REFERRED

## 2020-09-17 NOTE — PATIENT INSTRUCTIONS
You were seen in the Electrophysiology Clinic today by: Dr. Russell    Plan:     Medication Changes:      Reduce ivabradine to 7.5mg in AM and 1/2 tablet in PM.    Labs/Tests Needed:    Follow up visit:    Further Instructions:      Your Care Team:  [facility] Cardiology   Telephone Number     Jeri Bravo RN [phone]     For scheduling appts or procedures:    Arlyn Amezcua   [phone]   For the Device Clinic (Pacemakers, ICDs, Loop Recorders)    During business hours: [phone]  After business hours:   [phone]- select option 4 and ask for job code [id number].       Cardiovascular Clinic:   [address] [place] [address]. [place] [address]      As always, Thank you for trusting us with your health care needs!

## 2020-09-21 ENCOUNTER — ANCILLARY PROCEDURE (OUTPATIENT)
Dept: GENERAL RADIOLOGY | Facility: CLINIC | Age: 50
End: 2020-09-21
Attending: ANESTHESIOLOGY
Payer: COMMERCIAL

## 2020-09-21 DIAGNOSIS — E03.9 HYPOTHYROIDISM, UNSPECIFIED TYPE: ICD-10-CM

## 2020-09-21 DIAGNOSIS — Z11.59 ENCOUNTER FOR SCREENING FOR OTHER VIRAL DISEASES: ICD-10-CM

## 2020-09-21 DIAGNOSIS — M25.559 HIP PAIN: ICD-10-CM

## 2020-09-21 LAB
SARS-COV-2 RNA SPEC QL NAA+PROBE: NOT DETECTED
SPECIMEN SOURCE: NORMAL
TSH SERPL DL<=0.005 MIU/L-ACNC: 5.11 MU/L (ref 0.4–4)

## 2020-09-21 PROCEDURE — U0003 INFECTIOUS AGENT DETECTION BY NUCLEIC ACID (DNA OR RNA); SEVERE ACUTE RESPIRATORY SYNDROME CORONAVIRUS 2 (SARS-COV-2) (CORONAVIRUS DISEASE [COVID-19]), AMPLIFIED PROBE TECHNIQUE, MAKING USE OF HIGH THROUGHPUT TECHNOLOGIES AS DESCRIBED BY CMS-2020-01-R: HCPCS | Performed by: ANESTHESIOLOGY

## 2020-09-22 ENCOUNTER — MYC MEDICAL ADVICE (OUTPATIENT)
Dept: INTERNAL MEDICINE | Facility: CLINIC | Age: 50
End: 2020-09-22

## 2020-09-24 ENCOUNTER — ANCILLARY PROCEDURE (OUTPATIENT)
Dept: RADIOLOGY | Facility: AMBULATORY SURGERY CENTER | Age: 50
End: 2020-09-24
Attending: ANESTHESIOLOGY
Payer: COMMERCIAL

## 2020-09-24 ENCOUNTER — HOSPITAL ENCOUNTER (OUTPATIENT)
Facility: AMBULATORY SURGERY CENTER | Age: 50
End: 2020-09-24
Attending: ANESTHESIOLOGY
Payer: COMMERCIAL

## 2020-09-24 VITALS
BODY MASS INDEX: 25.9 KG/M2 | WEIGHT: 165 LBS | TEMPERATURE: 97.5 F | RESPIRATION RATE: 15 BRPM | SYSTOLIC BLOOD PRESSURE: 135 MMHG | HEIGHT: 67 IN | DIASTOLIC BLOOD PRESSURE: 90 MMHG | HEART RATE: 50 BPM | OXYGEN SATURATION: 96 %

## 2020-09-24 DIAGNOSIS — R52 PAIN: ICD-10-CM

## 2020-09-24 DIAGNOSIS — M47.812 FACET ARTHROPATHY, CERVICAL: ICD-10-CM

## 2020-09-24 RX ORDER — BUPIVACAINE HYDROCHLORIDE 2.5 MG/ML
INJECTION, SOLUTION EPIDURAL; INFILTRATION; INTRACAUDAL PRN
Status: DISCONTINUED | OUTPATIENT
Start: 2020-09-24 | End: 2020-09-24 | Stop reason: HOSPADM

## 2020-09-24 ASSESSMENT — MIFFLIN-ST. JEOR: SCORE: 1411.07

## 2020-09-24 NOTE — DISCHARGE INSTRUCTIONS
PAIN INJECTION HOME CARE INSTRUCTIONS  Activity  -You may resume most normal activity levels with the exception of strenuous activity. It may help to move in ways that hurt before the injection, to see if the pain is still there, but do not overdo it. Take it easy for the rest of the day.    -DO NOT remove bandaid for 24 hours  -DO NOT shower for 24 hours      Pain  -You may feel immediate pain relief and numbness for a period of time after the injection. This may indicate the medication has reached the right spot.  -Your pain may return after this short pain-free period, or may even be a little worse for a day or two. It may be caused by needle irritation or by the medication itself. The medications usually take two or three days to start working, but can take as long as a week.    -You may use an ice pack for 20 minutes every 2 hours for the first 24 hours  -You may use a heating pad after the first 24 hours  -You may use Tylenol (acetaminophen) every 4 hours or other pain medicines as directed by your physician      DID YOU RECEIVE SEDATION TODAY?  No    DID YOU RECEIVE STEROIDS TODAY?  No      PLEASE KEEP TRACK OF YOUR SYMPTOMS AND NOTE ANY CHANGES FOR YOUR DOCTOR.       Please contact us if you have:  -Severe pain  -Fever more than 101.5 degrees Fahrenheit  -Signs of infection at the injection site (redness, swelling, or drainage)    If you have questions, please contact the Pain Clinic at [phone] between the hours of 7:00 am and 3:00 pm Monday through Friday. After office hours you can contact the on call provider by dialing [phone]. If you need immediate attention, we recommend that you go to a hospital emergency room or dial [phone].    If you have scheduling questions please call [phone].

## 2020-09-24 NOTE — OP NOTE
Patient: Ada Welch Age: 49 year old   MRN: [id number] Attending: Dr. Martinez     Date of Visit: September 24, 2020      PAIN MEDICINE CLINIC PROCEDURE NOTE    ATTENDING CLINICIAN:    Faiza Martinez MD    ASSISTANT CLINICIAN:  Franklyn Wong DO    PREPROCEDURE DIAGNOSES:  1.  Cervical facet arthropathy   2.  Cervicalgia  3.  Neck pain       PROCEDURE(S) PERFORMED:  1.  Left C3, C4 and C5 medial branch nerve block(s)   2.  Fluoroscopic guidance for the above procedures    ANESTHESIA:  Local.    BLOOD LOSS:  Minimal.    DRAINS AND SPECIMENS:  None.    COMPLICATIONS:  None.    INDICATIONS:  Ada Welch is a 49 year old female with a history of cervicalgia.  Her most recent cervical spine MRI showed cervical spondylosis and bilateral facet arthropathy at the level of C3-C4, C4-C5 and C5-C6 level     The patient stated that the patient was in their usual state of health and denied recent anticoagulant use or recent infections.  Therefore, the plan is to perform above mentioned procedures.     Procedure Details:  The patient was met in the procedure room, where the patient was identified by name, medical record number and date of birth.  All of the patient s last minute questions were answered. Written informed consent was obtained and saved in the electronic medical record, after the risks, benefits, and alternatives were discussed with the patient.      A formal time-out procedure was performed as per protocol, including patient name, title of procedure, and site of procedure, and all in the room concurred.  Routine monitors were applied.      The patient was placed in the left lateral decubitus position  on the procedure room table.  All pressure points were checked and comfortably padded.  Routine monitors were placed.  Vital signs were stable.    A chlorhexidine prep was completed followed by sterile draping per standard procedure.     Using C-arm AP fluoroscopy we identified center of the trapezoid of the  left C3, C4 and C5 cervical vertebrae.  Then the proposed injection tracts were anesthetized with 1% lidocaine using a 1-1/2 inch 25-gauge needle.We then introduced a 25-gauge 3.5 inch spinal needles under fluoroscopic guidance  at center of the trapezoid of the targeted facet joints.  No parasthesia was elicited with needle placement and aspiration was negative for CSF or blood. Then, 0.5 mL 2% lidocaine is injected into the space.     Light pressure was held at the puncture site(s) to prevent ecchymosis and oozing.  The patient's skin was cleansed, and hemostasis was confirmed.  Band-aids were applied to the needle injection site(s).      Condition:    The patient remained awake and alert throughout the procedure.  The patient tolerated the procedure well and was monitored for approximately 15 minutes afterward in the post procedure area.  There were no immediate post procedure complications noted.  The patient was then discharged to home as per protocol.  The patient will follow up in the outpatient clinic in 4 week(s), unless otherwise clinically indicated.

## 2020-09-24 NOTE — H&P
"Abbreviated History and Physical    Patient Name: Ada Welch  YOB: 1970    Reason for Procedure: Facet arthropathy and cervical spondylosis    History:    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     Cervicalgia      Chronic sinusitis 00/2007    Diagnosed with chronic pansinusitis 2016     CSF leak     Chiari malformation     Depression      Tom-Danlos disease      Eosinophilic esophagitis 08/2018     Gastroesophageal reflux disease 3/1/2017    I have large hiatal hernia     Hiatal hernia 2016    Have adeline hiatel hernia     Hoarseness Unsure    It comes and goes     Hypothyroid      IBS (irritable bowel syndrome) with constipation     improved on Linzess     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     Migraines 1/1/2007    Unsure of exact date     Nasal polyps 2013    Were revoved 03/2017, benign     Orthostatic hypotension      Other nervous system complications 1/2014    Autonomic nervous system disorder, neuralgia, neuropathy     Swelling, mass, or lump in head and neck 08/2016    Lymph node has been growing for last year     Thyroid disease 01/01/2008     Urinary incontinence      Urinary urgency        History of obstructive sleep apnea?  No    History of problems with sedation?  No    Physical exam:  BP (!) 135/90   Pulse 50   Temp 97.5  F (36.4  C) (Temporal)   Resp 15   Ht 1.702 m (5' 7\")   Wt 74.8 kg (165 lb)   SpO2 96%   BMI 25.84 kg/m    General: in no apparent distress   Neuro: At least antigravity strength noted in all 4 extremities  Respiratory: Clear to ausculation bilaterally   Cardiac: Regular rate and rhythm  Skin: No rashes or lesions " on exposed areas of skin    Medications:   Current Outpatient Medications   Medication     amitriptyline (ELAVIL) 10 MG tablet     buprenorphine (BUTRANS) 10 MCG/HR WK patch     DULoxetine (CYMBALTA) 30 MG capsule     famotidine (PEPCID) 20 MG tablet     ferrous fumarate 65 mg, [place]. FE,-Vitamin C 125 mg (VITRON-C)  MG TABS tablet     fluticasone (FLONASE) 50 MCG/ACT nasal spray     ivabradine (CORLANOR) 7.5 MG tablet     levothyroxine (SYNTHROID/LEVOTHROID) 25 MCG tablet     linaclotide (LINZESS) 145 MCG capsule     medical cannabis (Patient's own supply.  Not a prescription)     metoclopramide (REGLAN) 10 MG tablet     Multiple Vitamins-Minerals (MULTIVITAMIN PO)     naproxen sodium (ANAPROX) 220 MG tablet     omeprazole (PRILOSEC) 40 MG DR capsule     propranolol ER (INDERAL LA) 120 MG 24 hr capsule     rizatriptan (MAXALT-MLT) 5 MG ODT     SUMAtriptan (IMITREX STATDOSE) 6 MG/0.5ML pen injector kit     tiZANidine (ZANAFLEX) 4 MG tablet     traMADol (ULTRAM) 50 MG tablet     traMADol (ULTRAM) 50 MG tablet     vitamin B complex with vitamin C (VITAMIN  B COMPLEX) TABS tablet     VITAMIN D, CHOLECALCIFEROL, PO     albuterol (PROAIR HFA/PROVENTIL HFA/VENTOLIN HFA) 108 (90 BASE) MCG/ACT Inhaler     cetirizine (ZYRTEC) 10 MG tablet     diclofenac (VOLTAREN) 1 % topical gel     EPINEPHrine (EPIPEN/ADRENACLICK/OR ANY BX GENERIC EQUIV) 0.3 MG/0.3ML injection 2-pack     Lidocaine (LIDOCARE) 4 % Patch     naloxone (NARCAN) 4 MG/0.1ML nasal spray     ondansetron (ZOFRAN-ODT) 4 MG ODT tab     Probiotic Product (PROBIOTIC DAILY PO)     No current facility-administered medications for this encounter.        Allergies:     Allergies   Allergen Reactions     Bee Venom Shortness Of Breath, Palpitations, Anaphylaxis and Difficulty breathing     Patient does carry Epi-Pen     Chicken-Derived Products (Egg) Nausea and Diarrhea     Compazine [Prochlorperazine]      Extreme jittery     Food      Milk     Gabapentin      Gluten  Meal GI Disturbance     Wheat bran and wheat     Pregabalin      Seasonal Allergies      Dust, mold     Topiramate        ASA Classification: 2    OK for local anesthetic use.     Faiza Martinez MD  Pain Medicine  [facility] Department of Anesthesia  9/24/2020

## 2020-09-26 DIAGNOSIS — E03.9 HYPOTHYROIDISM, UNSPECIFIED TYPE: ICD-10-CM

## 2020-09-29 ENCOUNTER — THERAPY VISIT (OUTPATIENT)
Dept: PHYSICAL THERAPY | Facility: CLINIC | Age: 50
End: 2020-09-29
Payer: COMMERCIAL

## 2020-09-29 DIAGNOSIS — M25.512 CHRONIC PAIN OF BOTH SHOULDERS: ICD-10-CM

## 2020-09-29 DIAGNOSIS — M25.511 CHRONIC PAIN OF BOTH SHOULDERS: ICD-10-CM

## 2020-09-29 DIAGNOSIS — G89.29 CHRONIC PAIN OF BOTH SHOULDERS: ICD-10-CM

## 2020-09-29 DIAGNOSIS — M47.812 CERVICAL FACET JOINT SYNDROME: Primary | ICD-10-CM

## 2020-09-29 DIAGNOSIS — M54.2 CERVICAL PAIN: ICD-10-CM

## 2020-09-29 PROCEDURE — 97112 NEUROMUSCULAR REEDUCATION: CPT | Mod: GP | Performed by: PHYSICAL THERAPIST

## 2020-09-29 PROCEDURE — 97110 THERAPEUTIC EXERCISES: CPT | Mod: GP | Performed by: PHYSICAL THERAPIST

## 2020-09-29 PROCEDURE — 97161 PT EVAL LOW COMPLEX 20 MIN: CPT | Mod: GP | Performed by: PHYSICAL THERAPIST

## 2020-09-29 NOTE — PROGRESS NOTES
[facility] Initial Evaluation -- Cervical    Evaluation Date: September 29, 2020  Ada Welch is a 49 year old female with a B shoulders condition.   Referral: Juan--anesthesiology  Work mechanical stresses: none   Employment status: none  Leisure mechanical stresses: caring for children  Functional disability score (NDI):    VAS score (0-10): 6/10  Patient goals/expectations:  To get the pain to go away.    HISTORY:    Present symptoms:  B anterior and posterior shoulder pain, B scapular pain.  Pain quality (sharp/shooting/stabbing/aching/burning/cramping):   Burning, aching.  Paresthesia (yes/no):  Numbness in R hand    Present since (onset date): years ago; MD orders 07/09/2020.  Symptoms (improving/unchanging/worsening):  Improving since recent nerve block    Symptoms commenced as a result of: unknown   Condition occurred in the following environment:  unknown    Symptoms at onset (neck/arm/forearm/headache): B anterior and posterior shoulder pain, B scapular pain and burning  Constant symptoms (neck/arm/forearm/headache): B shoulder pain--anterior and posterior  Intermittent symptoms (neck/arm/forearm/headache): none    Symptoms are made worse with the following: reaching, lifting, use of arms   Symptoms are made better with the following: recent nerve block on L    Disturbed sleep (yes/no):  Yes--occasional  Number of pillows: 1  Sleeping postures (prone/sup/side R/L): sides    Previous episodes (0/1-5/6-10/11+): ? Year of first episode: years ago    Previous history: ongoing neck problems for years  Previous treatments: recent L cervical nerve block    Specific Questions: (as reported by the patient)  Dizziness/Tinnitus/Nausea/Swallowing (pos/neg): neg  Gait/Upper Limbs (normal/abnormal): normal--pain with B shoulder elevation but normal ROM  Medications (nil/NSAIDS/anlag/steroids/anticoag/other):  Muscle relaxants and Other - sleep, pain, thyroid, heart rate, migraine, acid  "reflux  Medical allergies:  none  General health (excellent/good/fair/poor):  fair  Pertinent medical history:  Concussions/Dizziness, Heart problems, Migraines/Headaches, Overweight, Thyroid problems, Change in skin color and hot/cold extremity, non-healing wounds, pain at night/rest, persistent fever/chills, severe dizziness, severe headaches, significant weaness, Tom Danlos, dysantononia, Chiai Malformation, DDD  Imaging (None/Xray/MRI/Other):  MRI--cervcial   Recent or major surgery (yes/no): no; history of spinal L 4-5 fusion, hiatal hernia, tonsilectomy, septum surgery  Night pain (yes/no): no  Accidents (yes/no): no  Unexplained weight loss (yes/no): no  Barriers at home: no  Other red flags: no    EXAMINATION    Posture:   Sitting (good/fair/poor): fair  Standing (good/fair/poor): good     Protruded head (yes/no): yes    Wry Neck (right/left/nil):  nil  Relevant (yes/no):  na     Correction of posture(better/worse/no effect): NE  Other observations:  none    Neurological:    Motor Deficit:  normal   Reflexes:  Not assessed  Sensory Deficit:  Not assessed   Dural signs:  Not assessed    Movement Loss:   Neri Mod Min Nil Pain   Protrusion    x NE   Flexion    x NE--\"stretch\" posterior neck   Retraction   x  \"good stretch\"   Extension   x  \"stretch\"   Lateral flexion R    x NE   Lateral flexion L    x NE   Rotation R   x  Decreases R shoulder pain   Rotation L   x  Increases R shoulder pain     Test Movements:   During: produces, abolishes, increases, decreases, no effect, centralizing, peripheralizing  After: better, worse, no better, no worse, no effect, centralized, peripheralized    Pretest symptoms sitting: R shoulder pain 4/10, L shoulder pain 2/10   Symptoms During Symptoms After ROM increased ROM decreased No Effect   PRO        Rep PRO        RET With self-OP--\"good stretch\" No Effect         Rep RET With self-OP--\"good stretch\"    Better    Less pain L rotation     RET EXT        Rep RET EXT      "   Pretest symptoms lying:     Symptoms During Symptoms After ROM increased ROM decreased No Effect   RET        Rep RET        RET EXT        Rep RET EXT        If required, pretest symptoms sitting:      Symptoms During Symptoms After ROM increased ROM decreased No Effect   LF-R        Rep LF-R        LF-L        Rep LF-L        ROT-R        Rep ROT-R        ROT-L        Rep ROT-L        FLEX        Rep FLEX            Static Tests:   Protrusion:    Flexion:    Retraction:    Extension (sitting/prone/supine):      Other Tests: B shoulder AROM:  Flexion and abduction WNL, both painful and slower than normal movement; ER mild limitation B, ERP.  B shoulder strength:  Flexion 5/5, abduction 5/5, mild pain increase B shoulders with each; ER B 4+/5 with pain B, IR 5/5, NE.    Provisional Classification:  Derangement - Asymmetrical, unilateral, symptoms below elbow    Principle of Management:  Education:  Posture--use of lumbar roll in sitting, avoid fwd head, importance/impact of posture on neck; POC, treatment rationale, expected response    Equipment provided:  None--has lumbar roll at home  Mechanical therapy (Y/N):  y   Extension principle:  Seated repeated cervical retraction with self-OP x10 reps, every 2 hours   Lateral principle:    Flexion principle:     Other:      ASSESSMENT/PLAN:    Patient is a 49 year old female with cervical and B shoulder complaints.  She has a long history of B shoulder and neck problems with L-sided symptoms improving since having a recent nerve block.  Treatment will focus on cervical retraction exercises in addition to posture and general scapular and shoulder strengthening to decrease pain and improve overall function and mobility.      Patient has the following significant findings with corresponding treatment plan.                Diagnosis 1:  R>L shoulder pain--cervical  Pain -  self management, education, directional preference exercise and home program  Decreased ROM -  therapeutic exercise, manual therapy, home program  Decreased strength - therapeutic exercise, therapeutic activities and home program  Decreased function - therapeutic activities and home program  Impaired posture - neuro re-education and home program    Cumulative Therapy Evaluation is: Low complexity.    Previous and current functional limitations:  (See Goal Flow Sheet for this information)    Short term and Long term goals: (See Goal Flow Sheet for this information)     Communication ability:  Patient appears to be able to clearly communicate and understand verbal and written communication and follow directions correctly.  Treatment Explanation - The following has been discussed with the patient:   RX ordered/plan of care  Anticipated outcomes  Possible risks and side effects  This patient would benefit from PT intervention to resume normal activities.   Rehab potential is good.    Frequency:  1 X week, once daily  Duration:  for 6 weeks  Discharge Plan:  Achieve all LTG.  Independent in home treatment program.  Reach maximal therapeutic benefit.    Please refer to the daily flowsheet for treatment today, total treatment time and time spent performing 1:1 timed codes.

## 2020-09-30 DIAGNOSIS — Z11.59 ENCOUNTER FOR SCREENING FOR OTHER VIRAL DISEASES: Primary | ICD-10-CM

## 2020-09-30 RX ORDER — LEVOTHYROXINE SODIUM 50 UG/1
50 TABLET ORAL DAILY
Qty: 90 TABLET | Refills: 1 | Status: SHIPPED | OUTPATIENT
Start: 2020-09-30 | End: 2021-05-27

## 2020-09-30 NOTE — TELEPHONE ENCOUNTER
LEVOTHYROXINE 25 MCG TABLET     Last Written Prescription Date:  9/3/20  Last Fill Quantity: 30,   # refills: 0  Last Office Visit : 4/24/20  Future Office visit:  none    Routing refill request to provider for review/approval because:  Abnormal TSH  TSH   Date Value Ref Range Status   09/21/2020 5.11 (H) 0.40 - 4.00 mU/L Final     Thank-you, Ellyn LLANOS RN Medication Refill Team

## 2020-09-30 NOTE — TELEPHONE ENCOUNTER
Dose increased.  Due for repeat lab in around 2 months.  Thanks,  Ellyn Rivera MD  Internal Medicine

## 2020-10-05 ENCOUNTER — TELEPHONE (OUTPATIENT)
Dept: ANESTHESIOLOGY | Facility: CLINIC | Age: 50
End: 2020-10-05

## 2020-10-05 DIAGNOSIS — M96.1 POSTLAMINECTOMY SYNDROME: ICD-10-CM

## 2020-10-05 NOTE — TELEPHONE ENCOUNTER
Refill request    Medication: buprenorphine (BUTRANS) 10 MCG/HR WK patch      MNPMP Checked: Yes    Last refilled 09/04/20 for 4 patches      Last clinic appointment: 07/09/20  Next clinic appointment: 10/08/20      Preferred pharmacy:    [facility] in [address]      Refill request    Medication: traMADol (ULTRAM) 50 MG tablet      MNPMP Checked: Yes    Last refilled 09/02/20 for 60 tablets      Last clinic appointment: 07/09/20  Next clinic appointment: 10/08/20      Preferred pharmacy:    [facility] in [address]

## 2020-10-06 ENCOUNTER — THERAPY VISIT (OUTPATIENT)
Dept: PHYSICAL THERAPY | Facility: CLINIC | Age: 50
End: 2020-10-06
Payer: COMMERCIAL

## 2020-10-06 DIAGNOSIS — Z11.59 ENCOUNTER FOR SCREENING FOR OTHER VIRAL DISEASES: ICD-10-CM

## 2020-10-06 DIAGNOSIS — M25.511 CHRONIC PAIN OF BOTH SHOULDERS: ICD-10-CM

## 2020-10-06 DIAGNOSIS — M54.2 CERVICAL PAIN: ICD-10-CM

## 2020-10-06 DIAGNOSIS — G89.29 CHRONIC PAIN OF BOTH SHOULDERS: ICD-10-CM

## 2020-10-06 DIAGNOSIS — M25.512 CHRONIC PAIN OF BOTH SHOULDERS: ICD-10-CM

## 2020-10-06 LAB
SARS-COV-2 RNA SPEC QL NAA+PROBE: NOT DETECTED
SPECIMEN SOURCE: NORMAL

## 2020-10-06 PROCEDURE — 97140 MANUAL THERAPY 1/> REGIONS: CPT | Mod: GP | Performed by: PHYSICAL THERAPIST

## 2020-10-06 PROCEDURE — 97110 THERAPEUTIC EXERCISES: CPT | Mod: GP | Performed by: PHYSICAL THERAPIST

## 2020-10-06 PROCEDURE — 97112 NEUROMUSCULAR REEDUCATION: CPT | Mod: GP | Performed by: PHYSICAL THERAPIST

## 2020-10-06 PROCEDURE — 99000 SPECIMEN HANDLING OFFICE-LAB: CPT | Performed by: PATHOLOGY

## 2020-10-06 PROCEDURE — U0003 INFECTIOUS AGENT DETECTION BY NUCLEIC ACID (DNA OR RNA); SEVERE ACUTE RESPIRATORY SYNDROME CORONAVIRUS 2 (SARS-COV-2) (CORONAVIRUS DISEASE [COVID-19]), AMPLIFIED PROBE TECHNIQUE, MAKING USE OF HIGH THROUGHPUT TECHNOLOGIES AS DESCRIBED BY CMS-2020-01-R: HCPCS | Mod: 90 | Performed by: PATHOLOGY

## 2020-10-06 RX ORDER — TRAMADOL HYDROCHLORIDE 50 MG/1
50 TABLET ORAL EVERY 6 HOURS PRN
Qty: 60 TABLET | Refills: 0 | Status: SHIPPED | OUTPATIENT
Start: 2020-10-09 | End: 2020-11-10

## 2020-10-07 NOTE — PROGRESS NOTES
[facility] : Progress Note    Date of visit: 10/8/2020    Interval history:  Ada Welch is a 49 year old female who  is well known to me for chronic low back pain and neck pain. The patient has very complex history of low back pain for ~25 years for which she underwent L4-S1 fusion (TLIF) 12/9/13.  Postoperatively, her back pain worsened (mainly LBP, but also radiated down both lower limbs in an S1 dermatomal distribution).   Underwent removal of posterior instrumentation (screws and guadalupe).  After fusion removal, her pain continued.  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  She even sought care from a EDS specialist in [place].  She is currently on disability.     She has a h/o significant autonomic dysfunction.  Since 2014, she reports that her heart rate has fluctuated from as low as  beats per minutes and her blood pressure also fluctuates at the same time without any aggravating factors.  The fluctuation of the blood pressure and heart rate happens when she is supine, sitting, standing, walking, etc.  She has seen cardiologist Dr. Russell who advised her to continue fludrocortisone, isometric exercise, low carbohydrate and low gluten diet.     She has been seeing Dr. Singh for urinary urgency and incontinence.  She is advised for improving lifestyle, dietary modification, optimizing bowel regimen and to start pelvic physical therapy.     She has seen Dr. Moncada for left upper extremity radicular pain along the C8 nerve root distribution.  Per Dr. Moncada's note, she does not have a surgical target which may explain her symptoms.  She still continues to report this symptom.  We discussed about epidural steroid injection, however she is reluctant to perform the procedures and states she previously had a bad reaction with a steroid.  Discussed about performing cervical epidural injection without steroid.     The patient has  seen Dr. Mack for eosinophilic esophagitis, GERD, and dysphagia.    She had open hernia surgery by Dr. Mack in February 2020.  Her postoperative course was uneventful.       The patient has been seeing Dr. Noemy Nguyen for chronic migraines. Patient reports that her chronic intractable headache pain recently got worse. 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  She has undergone blood patches intermittently for her headache pain.  The latest blood patches did not improve her pain.  MRI of the cervical spine and brain were ordered.  Her cervical spine MRI on 11/2/2019 shows straightening of the cervical lordosis, cerebellar tonsils are 5 mm below the foramen magnum, mild stepwise 5 and 6 retrolisthesis and disc space narrowing.  She also has disc degeneration at multiple levels.  At C4-C5 level the patient has right disc bulging with mild bilateral neuroforaminal narrowing also left facet arthropathy.  Her MRI findings correlate with her symptoms as she does report neck pain mostly on the left side.  Turning her head to the left increases her pain.  She then came to us for diagnostic medial branch nerve block to see if her neck pain is facetogenic.  She reports excellent pain relief for the first cervical medial branch nerve block.  Her second cervical medial branch nerve block is due today.     She has been managing her pain with stable dose of Butrans 10 mcg per hour q 7 days, tramadol 50 mg every 6 hours as needed which she refills every 2-3 months.  In addition she takes tizanidine 4 mg 4 times daily as needed.    She takes sumatriptan for breakthrough migraine.  Occasionally she takes oxycodone or tramadol when her pain gets significantly worse.     Today she also reports right shoulder pain.  In the last visit I ordered shoulder physical therapy.  She still unable to raise her right arm more than 90 degrees and having significant pain.  On examination her shoulder is painful.  Discussed  "about continuing physical therapy for now if pain persists will consider about shoulder MRI versus x-ray.        [place] Prescription Monitoring Program:   Reviewed. No concerns     Review of Systems:  The 14 system ROS was reviewed and was negative except what is documented above and as follows.  Any bowel or bladder problems: none  Mood: okay    Physical Exam:  Vitals:    10/08/20 0900   BP: 100/67   Pulse: 61   Resp: 16   Weight: 74.8 kg (165 lb)   Height: 1.702 m (5' 7\")       General: Awake in no apparent distress.   Eyes: Sclerae are anicteric. PERRLA, EOMI   Neck: supple, no masses.   Lungs: unlabored.   Heart: regular rate and rhythm   Abdomen: soft non tender.  Extremities: Pulses are well palpable, no peripheral edema.   Musculoskeletal: Tenderness to palpation in the neck muscles including trapezius.  Cervical paraspinal tenderness noted.  Turning head against resistance was painful.  Patient is unable to lift her right shoulder more than 90 degrees.  Palpation of the right shoulder joint is painful.  Full range of motion of the right shoulder is limited.  Neurologic exam:Sensation intact throughout all dermatomes bilateral upper extremities and lower extremities  Psychiatric; Normal affect.   Skin: Warm and Dry.    Medications:  Current Outpatient Medications   Medication Sig Dispense Refill     albuterol (PROAIR HFA/PROVENTIL HFA/VENTOLIN HFA) 108 (90 BASE) MCG/ACT Inhaler Inhale 2 puffs into the lungs every 6 hours as needed        amitriptyline (ELAVIL) 10 MG tablet Take 3 tablets (30 mg) by mouth At Bedtime 90 tablet 1     buprenorphine (BUTRANS) 10 MCG/HR WK patch Place 1 patch onto the skin once a week 4 patch 0     cetirizine (ZYRTEC) 10 MG tablet Take 10 mg by mouth daily as needed        diclofenac (VOLTAREN) 1 % topical gel Place 2 g onto the skin 4 times daily 1 Tube 0     DULoxetine (CYMBALTA) 30 MG capsule Take 60mg in the morning and 30mg at bedtime 270 capsule 3     EPINEPHrine " (EPIPEN/ADRENACLICK/OR ANY BX GENERIC EQUIV) 0.3 MG/0.3ML injection 2-pack INJECT 0.3 MLS (0.3 MG) INTO THE MUSCLE AS NEEDED FOR ANAPHYLAXIS  1     famotidine (PEPCID) 20 MG tablet Take 1 tablet (20 mg) by mouth At Bedtime 90 tablet 2     ferrous fumarate 65 mg, [place]. FE,-Vitamin C 125 mg (VITRON-C)  MG TABS tablet Take 1 tablet by mouth every other day 60 tablet 1     fluticasone (FLONASE) 50 MCG/ACT nasal spray INSTILL 2 SPRAYS INTO BOTH NOSTRILS DAILY 48 mL 3     ivabradine (CORLANOR) 7.5 MG tablet Take 1 tablet (7.5 mg) by mouth 2 times daily (with meals) 180 tablet 3     levothyroxine (SYNTHROID/LEVOTHROID) 50 MCG tablet Take 1 tablet (50 mcg) by mouth daily Repeat labs in 2 months 90 tablet 1     Lidocaine (LIDOCARE) 4 % Patch Place 1-3 patches onto the skin every 24 hours To prevent lidocaine toxicity, patient should be patch free for 12 hrs daily. 20 patch 0     linaclotide (LINZESS) 145 MCG capsule Take 1 capsule (145 mcg) by mouth every morning (before breakfast) 90 capsule 3     medical cannabis (Patient's own supply.  Not a prescription) Take 1 Dose by mouth See Admin Instructions (This is NOT a prescription, and does not certify that the patient has a qualifying medical condition for medical cannabis.  The purpose of this order is  to document that the patient reports taking medical cannabis.)     Capsule formulation - uses as needed (currently out of this and not using as of January 28, 2020)       metoclopramide (REGLAN) 10 MG tablet Take 1 tablet (10 mg) by mouth 4 times daily as needed (headache) 40 tablet 3     Multiple Vitamins-Minerals (MULTIVITAMIN PO) Take 1 tablet by mouth daily        naloxone (NARCAN) 4 MG/0.1ML nasal spray Spray 1 spray (4 mg) into one nostril alternating nostrils once as needed for opioid reversal every 2-3 minutes until assistance arrives 0.2 mL 0     naproxen sodium (ANAPROX) 220 MG tablet Take 440-660 mg by mouth daily as needed for moderate pain       omeprazole  (PRILOSEC) 40 MG DR capsule Take 1 capsule (40 mg) by mouth 2 times daily as needed (prn) 180 capsule 3     ondansetron (ZOFRAN-ODT) 4 MG ODT tab Take 1-2 tablets (4-8 mg) by mouth every 12 hours as needed for nausea 60 tablet 1     Probiotic Product (PROBIOTIC DAILY PO) Take 2 packets by mouth every morning        propranolol ER (INDERAL LA) 120 MG 24 hr capsule Take 1 capsule (120 mg) by mouth daily 30 capsule 11     rizatriptan (MAXALT-MLT) 5 MG ODT TAKE 1-2 TABLETS (5-10 MG) BY MOUTH AT ONSET OF HEADACHE FOR MIGRAINE (MAX 30 MG IN 24 HOURS) 18 tablet 11     SUMAtriptan (IMITREX STATDOSE) 6 MG/0.5ML pen injector kit Inject 0.5 ml (6 mg) subcutaneously at onset of migraine, May repeat in 1 hour , Max 12 mg/24 hrs 2 kit 11     tiZANidine (ZANAFLEX) 4 MG tablet Take 1 tablet (4 mg) by mouth 4 times daily as needed for muscle spasms 360 tablet 5     [START ON 10/9/2020] traMADol (ULTRAM) 50 MG tablet Take 1 tablet (50 mg) by mouth every 6 hours as needed for severe pain Do not start before October 9, 2020. 60 tablet 0     vitamin B complex with vitamin C (VITAMIN  B COMPLEX) TABS tablet Take 1 tablet by mouth daily       VITAMIN D, CHOLECALCIFEROL, PO Take 2,000 Units by mouth daily         Analgesic Medications:   Medications related to Pain Management (From now, onward)    Start     Dose/Rate Route Frequency Ordered Stop    10/08/20 0000  buprenorphine (BUTRANS) 10 MCG/HR WK patch      1 patch Transdermal WEEKLY 10/08/20 0925      10/08/20 0000  amitriptyline (ELAVIL) 10 MG tablet      30 mg Oral AT BEDTIME 10/08/20 0925               LABORATORY VALUES:   Recent Labs   Lab Test 02/12/20  0611 02/11/20  0649    138   POTASSIUM 3.6 3.9   CHLORIDE 106 106   CO2 27 25   ANIONGAP 6 6   * 131*   BUN 6* 7   CR 0.62 0.56   JUAN 8.0* 8.0*       CBC RESULTS:   Recent Labs   Lab Test 02/13/20  0710 02/11/20  0649   WBC  --  15.4*   RBC  --  3.82   HGB  --  10.1*   HCT  --  33.0*   MCV  --  86   MCH  --  26.4*    [facility]  --  30.6*   RDW  --  14.8    210       Most Recent 3 INR's:  Recent Labs   Lab Test 01/30/20  1325 02/28/19  1045 12/17/18  0920   INR 0.93 0.97 0.90           ASSESSMENT:    Chronic pain syndrome status post Butrans and tramadol therapy  Failed back surgery syndrome  Intractable migraine  Tom-Danlos syndrome  Dysautonomia  Gastroparesis  Urinary urgency and incontinence  Chronic, continuous use of opioid  Intractable chronic migraine without aura and without status migrainosus  Spontaneous intracranial hypotension status post epidural blood patch  Chronic neck pain status post cervical medial branch nerve block  Occipital neuralgia status post occipital nerve block  Right shoulder pain status post physical therapy (discussed about shoulder MRI and shoulder injection)    PLAN:    1. Medications.   -Continue transdermal Butrans 10 mcg per hour q 7 days .  4 patches dispensed today  -Continue propranolol 120 mg p.o. daily  -Continued Zanaflex 4 mg 4 times daily as needed  -Continue tramadol 50 mg p.o. every 6 hours as needed.  No medication dispensed today.  -Amitriptyline  30 mg nightly.  90 tablets dispensed today with 1 refill.  -Cymbalta continued 60 mg AM and 30 mg PM  -Maxalt 5-10 mg p.o. at onset of the headache for migraine (max 30 mg in 24 hours)        2. Interventional procedures:  Patient will have her second cervical medial branch nerve block today.  Based on her response to the injection will plan for radiofrequency ablation.     3. Labs and imaging:   Reviewed her x-ray of the right hip.  No sign of osteoarthritis noted on the x-ray.  Discussed about right shoulder joint MRI versus x-ray if she fails the physical therapy     4. Rehab:    -bilateral shoulder physical therapy was ordered during the last visit     5. Psychology:   -No current needs.      6. Disposition: We will see the patient for above-mentioned procedure.        Faiza Martinez MD, PhD     [facility]  [address] [id number] [phone]  Dept: [phone]

## 2020-10-08 ENCOUNTER — OFFICE VISIT (OUTPATIENT)
Dept: ANESTHESIOLOGY | Facility: CLINIC | Age: 50
End: 2020-10-08
Payer: COMMERCIAL

## 2020-10-08 ENCOUNTER — ANCILLARY PROCEDURE (OUTPATIENT)
Dept: RADIOLOGY | Facility: AMBULATORY SURGERY CENTER | Age: 50
End: 2020-10-08
Attending: ANESTHESIOLOGY
Payer: COMMERCIAL

## 2020-10-08 ENCOUNTER — HOSPITAL ENCOUNTER (OUTPATIENT)
Facility: AMBULATORY SURGERY CENTER | Age: 50
Discharge: HOME | End: 2020-10-08
Attending: ANESTHESIOLOGY | Admitting: ANESTHESIOLOGY
Payer: COMMERCIAL

## 2020-10-08 ENCOUNTER — MYC MEDICAL ADVICE (OUTPATIENT)
Dept: ANESTHESIOLOGY | Facility: CLINIC | Age: 50
End: 2020-10-08

## 2020-10-08 VITALS
WEIGHT: 165 LBS | RESPIRATION RATE: 18 BRPM | OXYGEN SATURATION: 96 % | DIASTOLIC BLOOD PRESSURE: 73 MMHG | HEART RATE: 58 BPM | HEIGHT: 67 IN | SYSTOLIC BLOOD PRESSURE: 115 MMHG | TEMPERATURE: 98.2 F | BODY MASS INDEX: 25.9 KG/M2

## 2020-10-08 VITALS
WEIGHT: 165 LBS | HEART RATE: 61 BPM | HEIGHT: 67 IN | DIASTOLIC BLOOD PRESSURE: 67 MMHG | BODY MASS INDEX: 25.9 KG/M2 | RESPIRATION RATE: 16 BRPM | SYSTOLIC BLOOD PRESSURE: 100 MMHG

## 2020-10-08 DIAGNOSIS — M47.812 CERVICAL FACET JOINT SYNDROME: ICD-10-CM

## 2020-10-08 DIAGNOSIS — M79.2 NEUROPATHIC PAIN: ICD-10-CM

## 2020-10-08 DIAGNOSIS — M96.1 POSTLAMINECTOMY SYNDROME: ICD-10-CM

## 2020-10-08 DIAGNOSIS — G89.4 CHRONIC PAIN SYNDROME: ICD-10-CM

## 2020-10-08 DIAGNOSIS — R52 PAIN: ICD-10-CM

## 2020-10-08 PROCEDURE — 64490 INJ PARAVERT F JNT C/T 1 LEV: CPT | Mod: RT | Performed by: ANESTHESIOLOGY

## 2020-10-08 PROCEDURE — 99214 OFFICE O/P EST MOD 30 MIN: CPT | Mod: 25 | Performed by: ANESTHESIOLOGY

## 2020-10-08 PROCEDURE — 64491 INJ PARAVERT F JNT C/T 2 LEV: CPT | Mod: XS | Performed by: ANESTHESIOLOGY

## 2020-10-08 PROCEDURE — 64490 INJ PARAVERT F JNT C/T 1 LEV: CPT | Mod: XS,LT

## 2020-10-08 RX ORDER — LIDOCAINE HYDROCHLORIDE 10 MG/ML
INJECTION, SOLUTION EPIDURAL; INFILTRATION; INTRACAUDAL; PERINEURAL PRN
Status: DISCONTINUED | OUTPATIENT
Start: 2020-10-08 | End: 2020-10-08 | Stop reason: HOSPADM

## 2020-10-08 RX ORDER — BUPIVACAINE HYDROCHLORIDE 2.5 MG/ML
INJECTION, SOLUTION EPIDURAL; INFILTRATION; INTRACAUDAL PRN
Status: DISCONTINUED | OUTPATIENT
Start: 2020-10-08 | End: 2020-10-08 | Stop reason: HOSPADM

## 2020-10-08 RX ORDER — AMITRIPTYLINE HYDROCHLORIDE 10 MG/1
30 TABLET ORAL AT BEDTIME
Qty: 90 TABLET | Refills: 1 | Status: SHIPPED | OUTPATIENT
Start: 2020-10-08 | End: 2021-04-29

## 2020-10-08 RX ORDER — BUPRENORPHINE 10 UG/H
1 PATCH TRANSDERMAL WEEKLY
Qty: 4 PATCH | Refills: 0 | Status: SHIPPED | OUTPATIENT
Start: 2020-10-08 | End: 2020-11-10

## 2020-10-08 ASSESSMENT — MIFFLIN-ST. JEOR
SCORE: 1411.07
SCORE: 1411.07

## 2020-10-08 ASSESSMENT — PAIN SCALES - GENERAL: PAINLEVEL: SEVERE PAIN (6)

## 2020-10-08 NOTE — PATIENT INSTRUCTIONS
Medications:    amitriptyline (ELAVIL) 10 MG tablet- Take 3 tablets (30 mg) by mouth At Bedtime     buprenorphine (BUTRANS) 10 MCG/HR WK patch-Place 1 patch onto the skin once a week.    For refills of medications - You MUST call (Or MyChart) the clinic DIRECTLY and at least 7 days before you run out of your medication.      Treatment planning:    Contact the clinic if you continue to have shoulder pain.   Dr. Martinez will order an MRI.       Recommended Follow up:      3 months with Dr. Martinez.        Please call [phone], option #1 to schedule your clinic appointment if you don't already have an appointment scheduled.      To speak with a nurse, schedule/reschedule/cancel a clinic appointment, or request a medication refill call: [phone], option #1.    You can also reach us by Open[employer]: [email or web address]

## 2020-10-08 NOTE — LETTER
10/8/2020       RE: Ada Welch  [address]  [place] [address]     Dear Colleague,    Thank you for referring your patient, Ada Welch, to the [facility] [place] at [facility]. Please see a copy of my visit note below.    [facility] for Comprehensive Chronic Pain Management : Progress Note    Date of visit: 10/8/2020    Interval history:  Ada Welch is a 49 year old female who  is well known to me for chronic low back pain and neck pain. The patient has very complex history of low back pain for ~25 years for which she underwent L4-S1 fusion (TLIF) 12/9/13.  Postoperatively, her back pain worsened (mainly LBP, but also radiated down both lower limbs in an S1 dermatomal distribution).   Underwent removal of posterior instrumentation (screws and guadalupe).  After fusion removal, her pain continued.  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  She even sought care from a EDS specialist in [place].  She is currently on disability.     She has a h/o significant autonomic dysfunction.  Since 2014, she reports that her heart rate has fluctuated from as low as  beats per minutes and her blood pressure also fluctuates at the same time without any aggravating factors.  The fluctuation of the blood pressure and heart rate happens when she is supine, sitting, standing, walking, etc.  She has seen cardiologist Dr. Russell who advised her to continue fludrocortisone, isometric exercise, low carbohydrate and low gluten diet.     She has been seeing Dr. Singh for urinary urgency and incontinence.  She is advised for improving lifestyle, dietary modification, optimizing bowel regimen and to start pelvic physical therapy.     She has seen Dr. Moncada for left upper extremity radicular pain along the C8 nerve root distribution.  Per Dr. Moncada's note, she does not have a surgical  target which may explain her symptoms.  She still continues to report this symptom.  We discussed about epidural steroid injection, however she is reluctant to perform the procedures and states she previously had a bad reaction with a steroid.  Discussed about performing cervical epidural injection without steroid.     The patient has seen Dr. Mack for eosinophilic esophagitis, GERD, and dysphagia.    She had open hernia surgery by Dr. Mack in February 2020.  Her postoperative course was uneventful.       The patient has been seeing Dr. Noemy Nguyen for chronic migraines. Patient reports that her chronic intractable headache pain recently got worse. 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  She has undergone blood patches intermittently for her headache pain.  The latest blood patches did not improve her pain.  MRI of the cervical spine and brain were ordered.  Her cervical spine MRI on 11/2/2019 shows straightening of the cervical lordosis, cerebellar tonsils are 5 mm below the foramen magnum, mild stepwise 5 and 6 retrolisthesis and disc space narrowing.  She also has disc degeneration at multiple levels.  At C4-C5 level the patient has right disc bulging with mild bilateral neuroforaminal narrowing also left facet arthropathy.  Her MRI findings correlate with her symptoms as she does report neck pain mostly on the left side.  Turning her head to the left increases her pain.  She then came to us for diagnostic medial branch nerve block to see if her neck pain is facetogenic.  She reports excellent pain relief for the first cervical medial branch nerve block.  Her second cervical medial branch nerve block is due today.     She has been managing her pain with stable dose of Butrans 10 mcg per hour q 7 days, tramadol 50 mg every 6 hours as needed which she refills every 2-3 months.  In addition she takes tizanidine 4 mg 4 times daily as needed.    She takes sumatriptan for breakthrough  "migraine.  Occasionally she takes oxycodone or tramadol when her pain gets significantly worse.     Today she also reports right shoulder pain.  In the last visit I ordered shoulder physical therapy.  She still unable to raise her right arm more than 90 degrees and having significant pain.  On examination her shoulder is painful.  Discussed about continuing physical therapy for now if pain persists will consider about shoulder MRI versus x-ray.        [place] Prescription Monitoring Program:   Reviewed. No concerns     Review of Systems:  The 14 system ROS was reviewed and was negative except what is documented above and as follows.  Any bowel or bladder problems: none  Mood: okay    Physical Exam:  Vitals:    10/08/20 0900   BP: 100/67   Pulse: 61   Resp: 16   Weight: 74.8 kg (165 lb)   Height: 1.702 m (5' 7\")     General: Awake in no apparent distress.   Eyes: Sclerae are anicteric. PERRLA, EOMI   Neck: supple, no masses.   Lungs: unlabored.   Heart: regular rate and rhythm   Abdomen: soft non tender.  Extremities: Pulses are well palpable, no peripheral edema.   Musculoskeletal: Tenderness to palpation in the neck muscles including trapezius.  Cervical paraspinal tenderness noted.  Turning head against resistance was painful.  Patient is unable to lift her right shoulder more than 90 degrees.  Palpation of the right shoulder joint is painful.  Full range of motion of the right shoulder is limited.  Neurologic exam:Sensation intact throughout all dermatomes bilateral upper extremities and lower extremities  Psychiatric; Normal affect.   Skin: Warm and Dry.    Medications:  Current Outpatient Medications   Medication Sig Dispense Refill     albuterol (PROAIR HFA/PROVENTIL HFA/VENTOLIN HFA) 108 (90 BASE) MCG/ACT Inhaler Inhale 2 puffs into the lungs every 6 hours as needed        amitriptyline (ELAVIL) 10 MG tablet Take 3 tablets (30 mg) by mouth At Bedtime 90 tablet 1     buprenorphine (BUTRANS) 10 MCG/HR WK " patch Place 1 patch onto the skin once a week 4 patch 0     cetirizine (ZYRTEC) 10 MG tablet Take 10 mg by mouth daily as needed        diclofenac (VOLTAREN) 1 % topical gel Place 2 g onto the skin 4 times daily 1 Tube 0     DULoxetine (CYMBALTA) 30 MG capsule Take 60mg in the morning and 30mg at bedtime 270 capsule 3     EPINEPHrine (EPIPEN/ADRENACLICK/OR ANY BX GENERIC EQUIV) 0.3 MG/0.3ML injection 2-pack INJECT 0.3 MLS (0.3 MG) INTO THE MUSCLE AS NEEDED FOR ANAPHYLAXIS  1     famotidine (PEPCID) 20 MG tablet Take 1 tablet (20 mg) by mouth At Bedtime 90 tablet 2     ferrous fumarate 65 mg, [place]. FE,-Vitamin C 125 mg (VITRON-C)  MG TABS tablet Take 1 tablet by mouth every other day 60 tablet 1     fluticasone (FLONASE) 50 MCG/ACT nasal spray INSTILL 2 SPRAYS INTO BOTH NOSTRILS DAILY 48 mL 3     ivabradine (CORLANOR) 7.5 MG tablet Take 1 tablet (7.5 mg) by mouth 2 times daily (with meals) 180 tablet 3     levothyroxine (SYNTHROID/LEVOTHROID) 50 MCG tablet Take 1 tablet (50 mcg) by mouth daily Repeat labs in 2 months 90 tablet 1     Lidocaine (LIDOCARE) 4 % Patch Place 1-3 patches onto the skin every 24 hours To prevent lidocaine toxicity, patient should be patch free for 12 hrs daily. 20 patch 0     linaclotide (LINZESS) 145 MCG capsule Take 1 capsule (145 mcg) by mouth every morning (before breakfast) 90 capsule 3     medical cannabis (Patient's own supply.  Not a prescription) Take 1 Dose by mouth See Admin Instructions (This is NOT a prescription, and does not certify that the patient has a qualifying medical condition for medical cannabis.  The purpose of this order is  to document that the patient reports taking medical cannabis.)     Capsule formulation - uses as needed (currently out of this and not using as of January 28, 2020)       metoclopramide (REGLAN) 10 MG tablet Take 1 tablet (10 mg) by mouth 4 times daily as needed (headache) 40 tablet 3     Multiple Vitamins-Minerals (MULTIVITAMIN PO) Take 1  tablet by mouth daily        naloxone (NARCAN) 4 MG/0.1ML nasal spray Spray 1 spray (4 mg) into one nostril alternating nostrils once as needed for opioid reversal every 2-3 minutes until assistance arrives 0.2 mL 0     naproxen sodium (ANAPROX) 220 MG tablet Take 440-660 mg by mouth daily as needed for moderate pain       omeprazole (PRILOSEC) 40 MG DR capsule Take 1 capsule (40 mg) by mouth 2 times daily as needed (prn) 180 capsule 3     ondansetron (ZOFRAN-ODT) 4 MG ODT tab Take 1-2 tablets (4-8 mg) by mouth every 12 hours as needed for nausea 60 tablet 1     Probiotic Product (PROBIOTIC DAILY PO) Take 2 packets by mouth every morning        propranolol ER (INDERAL LA) 120 MG 24 hr capsule Take 1 capsule (120 mg) by mouth daily 30 capsule 11     rizatriptan (MAXALT-MLT) 5 MG ODT TAKE 1-2 TABLETS (5-10 MG) BY MOUTH AT ONSET OF HEADACHE FOR MIGRAINE (MAX 30 MG IN 24 HOURS) 18 tablet 11     SUMAtriptan (IMITREX STATDOSE) 6 MG/0.5ML pen injector kit Inject 0.5 ml (6 mg) subcutaneously at onset of migraine, May repeat in 1 hour , Max 12 mg/24 hrs 2 kit 11     tiZANidine (ZANAFLEX) 4 MG tablet Take 1 tablet (4 mg) by mouth 4 times daily as needed for muscle spasms 360 tablet 5     [START ON 10/9/2020] traMADol (ULTRAM) 50 MG tablet Take 1 tablet (50 mg) by mouth every 6 hours as needed for severe pain Do not start before October 9, 2020. 60 tablet 0     vitamin B complex with vitamin C (VITAMIN  B COMPLEX) TABS tablet Take 1 tablet by mouth daily       VITAMIN D, CHOLECALCIFEROL, PO Take 2,000 Units by mouth daily       Analgesic Medications:   Medications related to Pain Management (From now, onward)    Start     Dose/Rate Route Frequency Ordered Stop    10/08/20 0000  buprenorphine (BUTRANS) 10 MCG/HR WK patch      1 patch Transdermal WEEKLY 10/08/20 0925      10/08/20 0000  amitriptyline (ELAVIL) 10 MG tablet      30 mg Oral AT BEDTIME 10/08/20 0925            LABORATORY VALUES:   Recent Labs   Lab Test  02/12/20  0611 02/11/20  0649    138   POTASSIUM 3.6 3.9   CHLORIDE 106 106   CO2 27 25   ANIONGAP 6 6   * 131*   BUN 6* 7   CR 0.62 0.56   JUAN 8.0* 8.0*         CBC RESULTS:   Recent Labs   Lab Test 02/13/20  0710 02/11/20  0649   WBC  --  15.4*   RBC  --  3.82   HGB  --  10.1*   HCT  --  33.0*   MCV  --  86   MCH  --  26.4*   MCHC  --  30.6*   RDW  --  14.8    210       Most Recent 3 INR's:  Recent Labs   Lab Test 01/30/20  1325 02/28/19  1045 12/17/18  0920   INR 0.93 0.97 0.90       ASSESSMENT:  Chronic pain syndrome status post Butrans and tramadol therapy  Failed back surgery syndrome  Intractable migraine  Tom-Danlos syndrome  Dysautonomia  Gastroparesis  Urinary urgency and incontinence  Chronic, continuous use of opioid  Intractable chronic migraine without aura and without status migrainosus  Spontaneous intracranial hypotension status post epidural blood patch  Chronic neck pain status post cervical medial branch nerve block  Occipital neuralgia status post occipital nerve block  Right shoulder pain status post physical therapy (discussed about shoulder MRI and shoulder injection)    PLAN:  1. Medications.   -Continue transdermal Butrans 10 mcg per hour q 7 days .  4 patches dispensed today  -Continue propranolol 120 mg p.o. daily  -Continued Zanaflex 4 mg 4 times daily as needed  -Continue tramadol 50 mg p.o. every 6 hours as needed.  No medication dispensed today.  -Amitriptyline  30 mg nightly.  90 tablets dispensed today with 1 refill.  -Cymbalta continued 60 mg AM and 30 mg PM  -Maxalt 5-10 mg p.o. at onset of the headache for migraine (max 30 mg in 24 hours)        2. Interventional procedures:  Patient will have her second cervical medial branch nerve block today.  Based on her response to the injection will plan for radiofrequency ablation.     3. Labs and imaging:   Reviewed her x-ray of the right hip.  No sign of osteoarthritis noted on the x-ray.  Discussed about right  shoulder joint MRI versus x-ray if she fails the physical therapy     4. Rehab:    -bilateral shoulder physical therapy was ordered during the last visit     5. Psychology:   -No current needs.      6. Disposition: We will see the patient for above-mentioned procedure.     AVS sent to the pt's MyChart.   Mya Cash LPN    Again, thank you for allowing me to participate in the care of your patient.  Sincerely,    Faiza Martinez MD, PhD

## 2020-10-08 NOTE — H&P
"Abbreviated History and Physical    Patient Name: Ada Welch  YOB: 1970    Reason for Procedure: chronic neck pain   History:    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     Cervicalgia      Chronic sinusitis 00/2007    Diagnosed with chronic pansinusitis 2016     CSF leak     Chiari malformation     Depression      Tom-Danlos disease      Eosinophilic esophagitis 08/2018     Gastroesophageal reflux disease 3/1/2017    I have large hiatal hernia     Hiatal hernia 2016    Have adeline hiatel hernia     Hoarseness Unsure    It comes and goes     Hypothyroid      IBS (irritable bowel syndrome) with constipation     improved on Linzess     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     Migraines 1/1/2007    Unsure of exact date     Nasal polyps 2013    Were revoved 03/2017, benign     Orthostatic hypotension      Other nervous system complications 1/2014    Autonomic nervous system disorder, neuralgia, neuropathy     Swelling, mass, or lump in head and neck 08/2016    Lymph node has been growing for last year     Thyroid disease 01/01/2008     Urinary incontinence      Urinary urgency        History of obstructive sleep apnea? no    History of problems with sedation? no    Physical exam:  /73   Pulse 58   Temp 98.2  F (36.8  C) (Temporal)   Resp 18   Ht 1.702 m (5' 7\")   Wt 74.8 kg (165 lb)   SpO2 96%   BMI 25.84 kg/m    General: in no apparent distress   Neuro: At least antigravity strength noted in all 4 extremities  Respiratory: Clear to ausculation bilaterally   Cardiac: Regular rate and rhythm  Skin: No rashes or lesions on exposed areas of " skin    Medications:   Current Outpatient Medications   Medication     amitriptyline (ELAVIL) 10 MG tablet     cetirizine (ZYRTEC) 10 MG tablet     diclofenac (VOLTAREN) 1 % topical gel     DULoxetine (CYMBALTA) 30 MG capsule     famotidine (PEPCID) 20 MG tablet     ferrous fumarate 65 mg, [place]. FE,-Vitamin C 125 mg (VITRON-C)  MG TABS tablet     fluticasone (FLONASE) 50 MCG/ACT nasal spray     ivabradine (CORLANOR) 7.5 MG tablet     levothyroxine (SYNTHROID/LEVOTHROID) 50 MCG tablet     Lidocaine (LIDOCARE) 4 % Patch     linaclotide (LINZESS) 145 MCG capsule     medical cannabis (Patient's own supply.  Not a prescription)     metoclopramide (REGLAN) 10 MG tablet     Multiple Vitamins-Minerals (MULTIVITAMIN PO)     naproxen sodium (ANAPROX) 220 MG tablet     omeprazole (PRILOSEC) 40 MG DR capsule     ondansetron (ZOFRAN-ODT) 4 MG ODT tab     Probiotic Product (PROBIOTIC DAILY PO)     propranolol ER (INDERAL LA) 120 MG 24 hr capsule     rizatriptan (MAXALT-MLT) 5 MG ODT     SUMAtriptan (IMITREX STATDOSE) 6 MG/0.5ML pen injector kit     tiZANidine (ZANAFLEX) 4 MG tablet     [START ON 10/9/2020] traMADol (ULTRAM) 50 MG tablet     vitamin B complex with vitamin C (VITAMIN  B COMPLEX) TABS tablet     VITAMIN D, CHOLECALCIFEROL, PO     albuterol (PROAIR HFA/PROVENTIL HFA/VENTOLIN HFA) 108 (90 BASE) MCG/ACT Inhaler     buprenorphine (BUTRANS) 10 MCG/HR WK patch     EPINEPHrine (EPIPEN/ADRENACLICK/OR ANY BX GENERIC EQUIV) 0.3 MG/0.3ML injection 2-pack     naloxone (NARCAN) 4 MG/0.1ML nasal spray     No current facility-administered medications for this encounter.        Allergies:     Allergies   Allergen Reactions     Bee Venom Shortness Of Breath, Palpitations, Anaphylaxis and Difficulty breathing     Patient does carry Epi-Pen     Chicken-Derived Products (Egg) Nausea and Diarrhea     Compazine [Prochlorperazine]      Extreme jittery     Food      Milk     Gabapentin      Dizzy     Gluten Meal GI Disturbance      Wheat bran and wheat     Pregabalin      Seasonal Allergies      Dust, mold     Topiramate      Pt does not remember reaction       ASA Classification: 2    OK for local anesthetic use.     Faiza Martinez MD  Pain Medicine  [facility] Department of Anesthesia  10/8/2020

## 2020-10-08 NOTE — DISCHARGE INSTRUCTIONS
PAIN INJECTION HOME CARE INSTRUCTIONS  Activity  -You may resume most normal activity levels with the exception of strenuous activity. It may help to move in ways that hurt before the injection, to see if the pain is still there, but do not overdo it. Take it easy for the rest of the day.    -DO NOT remove bandaid for 24 hours  -DO NOT shower for 24 hours      Pain  -You may feel immediate pain relief and numbness for a period of time after the injection. This may indicate the medication has reached the right spot.  -Your pain may return after this short pain-free period, or may even be a little worse for a day or two. It may be caused by needle irritation or by the medication itself. The medications usually take two or three days to start working, but can take as long as a week.    -You may use an ice pack for 20 minutes every 2 hours for the first 24 hours  -You may use a heating pad after the first 24 hours  -You may use Tylenol (acetaminophen) every 4 hours or other pain medicines as directed by your physician      DID YOU RECEIVE SEDATION TODAY?  No    DID YOU RECEIVE STEROIDS TODAY?  No    Common side effects of steroids:  Not everyone will experience corticosteroid side effects. If side effects are experienced, they will gradually subside in the 7-10 day period following an injection. Most common side effects include:  -Flushed face and/or chest  -Feeling of warmth, particularly in the face but could be an overall feeling of warmth  -Increased blood sugar in diabetic patients  -Menstrual irregularities my occur. If taking hormone-based birth control an alternate method of birth control is recommended  -Sleep disturbances and/or mood swings are possible  -Leg cramps    PLEASE KEEP TRACK OF YOUR SYMPTOMS AND NOTE ANY CHANGES FOR YOUR DOCTOR.       Please contact us if you have:  -Severe pain  -Fever more than 101.5 degrees Fahrenheit  -Signs of infection at the injection site (redness, swelling, or  drainage)    If you have questions, please contact the Pain Clinic at [phone] between the hours of 7:00 am and 3:00 pm Monday through Friday. After office hours you can contact the on call provider by dialing [phone]. If you need immediate attention, we recommend that you go to a hospital emergency room or dial [phone].    If you have scheduling questions please call [phone].

## 2020-10-08 NOTE — OP NOTE
Patient: Ada Welch Age: 49 year old   MRN: [id number] Attending: Dr. Martinez     Date of Visit: October 8, 2020      PAIN MEDICINE CLINIC PROCEDURE NOTE    ATTENDING CLINICIAN:    Faiza Martinez MD    ASSISTANT CLINICIAN:  Franklyn Wong DO    PREPROCEDURE DIAGNOSES:  1.  Cervical facet arthropathy   2.  Cervicalgia  3.  Neck pain       POSTPROCEDURE DIAGNOSES:  1.  Cervical facet arthropathy   2.  Cervicalgia  3.  Neck pain       PROCEDURE(S) PERFORMED:  1.  Bilateral C3, C4 and C5 medial branch block(s)   2.  Fluoroscopic guidance for the above procedures    ANESTHESIA:  Local.    BLOOD LOSS:  Minimal.    DRAINS AND SPECIMENS:  None.    COMPLICATIONS:  None.    INDICATIONS:  Ada Welch is a 49 year old female with a history of chronic neck pain.  The patient stated that the patient was in their usual state of health and denied recent anticoagulant use or recent infections.  Therefore, the plan is to perform above mentioned procedures.     Procedure Details:  The patient was met in the procedure room, where the patient was identified by name, medical record number and date of birth.  All of the patient s last minute questions were answered. Written informed consent was obtained and saved in the electronic medical record, after the risks, benefits, and alternatives were discussed with the patient.      A formal time-out procedure was performed as per protocol, including patient name, title of procedure, and site of procedure, and all in the room concurred.  Routine monitors were applied.      The patient was placed in the prone position on the procedure room table.  All pressure points were checked and comfortably padded.  Routine monitors were placed.  Vital signs were stable.    A chlorhexidine prep was completed followed by sterile draping per standard procedure.     Using C-arm AP fluoroscopy we identified the C3 articular pillars on the left. The skin overlying the center of these articular pillars  were marked. Then the proposed injection tracts were anesthetized with 1% lidocaine using a 1-1/2 inch 25-gauge needle.We then introduced a 25-gauge 3.5 inch spinal needles under fluoroscopic guidance at the superior aspect of the articular pillars until periosteum was encountered. Then needles are then walked off. Final needle position was at center of the trapezoid of the target facet on lateral radiographs and the middle of waist of articular pillars in AP radiographs. After a negative aspirate to make sure that there was no intravascular placement, Isovue was then injected to confirm no vascular runoff.   No parasthesia was elicited with needle placement.  Aspiration was negative for CSF or blood. Then, 0.5 mL 0.25% bupivacaine is injected into the space.  The exact same procedure is performed in the right C3 and bilateral C4 and C5 levels.     Light pressure was held at the puncture site(s) to prevent ecchymosis and oozing.  The patient's skin was cleansed, and hemostasis was confirmed.  Band-aids were applied to the needle injection site(s).      Condition:    The patient remained awake and alert throughout the procedure.  The patient tolerated the procedure well and was monitored for approximately 15 minutes afterward in the post procedure area.  There were no immediate post procedure complications noted.  The patient was then discharged to home as per protocol.  The patient will follow up in the outpatient clinic in 4 week(s), unless otherwise clinically indicated.    Patient condition  stable.

## 2020-10-16 ENCOUNTER — MYC MEDICAL ADVICE (OUTPATIENT)
Dept: INTERNAL MEDICINE | Facility: CLINIC | Age: 50
End: 2020-10-16

## 2020-10-20 NOTE — TELEPHONE ENCOUNTER
Ok for in person appointment any provider in [facility] or [place].   LINDA Herzog at 2:33 PM on 10/20/2020.

## 2020-11-04 DIAGNOSIS — G89.4 CHRONIC PAIN SYNDROME: ICD-10-CM

## 2020-11-04 DIAGNOSIS — M96.1 POSTLAMINECTOMY SYNDROME: ICD-10-CM

## 2020-11-05 ENCOUNTER — TELEPHONE (OUTPATIENT)
Dept: ANESTHESIOLOGY | Facility: CLINIC | Age: 50
End: 2020-11-05

## 2020-11-05 NOTE — TELEPHONE ENCOUNTER
Refill request    Medication: traMADol (ULTRAM) 50 MG tablet      [place]NPMP Checked: Yes    Last refilled 10/06/20 for 60 tablets      Last clinic appointment: 10/08/20  Next clinic appointment: Pt does not have a follow up scheduled at this time      Preferred pharmacy:    [facility] in [address]      Refill request    Medication: buprenorphine (BUTRANS) 10 MCG/HR WK patch      MNPMP Checked: Yes    Last refilled 10/08/20 for 4 patches      Last clinic appointment: 10/08/20  Next clinic appointment: Pt does not have a follow up scheduled at this time      Preferred pharmacy:    [facility] in [address]

## 2020-11-11 ENCOUNTER — MYC MEDICAL ADVICE (OUTPATIENT)
Dept: ANESTHESIOLOGY | Facility: CLINIC | Age: 50
End: 2020-11-11

## 2020-11-12 NOTE — TELEPHONE ENCOUNTER
Okay to refill, per Dr. Martinez.     LPN refilled medications then called them into their preferred pharmacy.     Mya Cash LPN

## 2020-11-13 ENCOUNTER — OFFICE VISIT (OUTPATIENT)
Dept: INTERNAL MEDICINE | Facility: CLINIC | Age: 50
End: 2020-11-13
Payer: COMMERCIAL

## 2020-11-13 VITALS — DIASTOLIC BLOOD PRESSURE: 87 MMHG | OXYGEN SATURATION: 99 % | SYSTOLIC BLOOD PRESSURE: 125 MMHG | HEART RATE: 66 BPM

## 2020-11-13 DIAGNOSIS — E55.9 VITAMIN D DEFICIENCY, UNSPECIFIED: ICD-10-CM

## 2020-11-13 DIAGNOSIS — R53.82 CHRONIC FATIGUE: ICD-10-CM

## 2020-11-13 DIAGNOSIS — R51.9 CHRONIC INTRACTABLE HEADACHE, UNSPECIFIED HEADACHE TYPE: ICD-10-CM

## 2020-11-13 DIAGNOSIS — R79.9 ABNORMAL FINDING OF BLOOD CHEMISTRY, UNSPECIFIED: ICD-10-CM

## 2020-11-13 DIAGNOSIS — Z23 NEED FOR TETANUS BOOSTER: ICD-10-CM

## 2020-11-13 DIAGNOSIS — M79.645 PAIN IN FINGER OF BOTH HANDS: Primary | ICD-10-CM

## 2020-11-13 DIAGNOSIS — Q79.60 EHLERS-DANLOS SYNDROME: ICD-10-CM

## 2020-11-13 DIAGNOSIS — Z23 NEED FOR PNEUMOCOCCAL VACCINATION: ICD-10-CM

## 2020-11-13 DIAGNOSIS — G89.29 CHRONIC INTRACTABLE HEADACHE, UNSPECIFIED HEADACHE TYPE: ICD-10-CM

## 2020-11-13 DIAGNOSIS — G89.4 PAIN SYNDROME, CHRONIC: ICD-10-CM

## 2020-11-13 DIAGNOSIS — M50.30 DDD (DEGENERATIVE DISC DISEASE), CERVICAL: ICD-10-CM

## 2020-11-13 DIAGNOSIS — M79.644 PAIN IN FINGER OF BOTH HANDS: Primary | ICD-10-CM

## 2020-11-13 LAB
ALBUMIN SERPL-MCNC: 3.9 G/DL (ref 3.4–5)
ALP SERPL-CCNC: 94 U/L (ref 40–150)
ALT SERPL W P-5'-P-CCNC: 23 U/L (ref 0–50)
ANION GAP SERPL CALCULATED.3IONS-SCNC: 4 MMOL/L (ref 3–14)
AST SERPL W P-5'-P-CCNC: 16 U/L (ref 0–45)
BASOPHILS # BLD AUTO: 0.1 10E9/L (ref 0–0.2)
BASOPHILS NFR BLD AUTO: 0.8 %
BILIRUB SERPL-MCNC: 0.6 MG/DL (ref 0.2–1.3)
BUN SERPL-MCNC: 10 MG/DL (ref 7–30)
CALCIUM SERPL-MCNC: 8.6 MG/DL (ref 8.5–10.1)
CHLORIDE SERPL-SCNC: 102 MMOL/L (ref 94–109)
CO2 SERPL-SCNC: 29 MMOL/L (ref 20–32)
CREAT SERPL-MCNC: 0.83 MG/DL (ref 0.52–1.04)
CRP SERPL-MCNC: <2.9 MG/L (ref 0–8)
DEPRECATED CALCIDIOL+CALCIFEROL SERPL-MC: 47 UG/L (ref 20–75)
DIFFERENTIAL METHOD BLD: NORMAL
EOSINOPHIL # BLD AUTO: 0.3 10E9/L (ref 0–0.7)
EOSINOPHIL NFR BLD AUTO: 5.3 %
ERYTHROCYTE [DISTWIDTH] IN BLOOD BY AUTOMATED COUNT: 14.6 % (ref 10–15)
FERRITIN SERPL-MCNC: 4 NG/ML (ref 8–252)
GFR SERPL CREATININE-BSD FRML MDRD: 83 ML/MIN/{1.73_M2}
GLUCOSE SERPL-MCNC: 96 MG/DL (ref 70–99)
HCT VFR BLD AUTO: 38.3 % (ref 35–47)
HGB BLD-MCNC: 12.1 G/DL (ref 11.7–15.7)
IMM GRANULOCYTES # BLD: 0 10E9/L (ref 0–0.4)
IMM GRANULOCYTES NFR BLD: 0.3 %
LYMPHOCYTES # BLD AUTO: 1.9 10E9/L (ref 0.8–5.3)
LYMPHOCYTES NFR BLD AUTO: 31.3 %
MCH RBC QN AUTO: 27.4 PG (ref 26.5–33)
MCHC RBC AUTO-ENTMCNC: 31.6 G/DL (ref 31.5–36.5)
MCV RBC AUTO: 87 FL (ref 78–100)
MONOCYTES # BLD AUTO: 0.4 10E9/L (ref 0–1.3)
MONOCYTES NFR BLD AUTO: 7 %
NEUTROPHILS # BLD AUTO: 3.4 10E9/L (ref 1.6–8.3)
NEUTROPHILS NFR BLD AUTO: 55.3 %
NRBC # BLD AUTO: 0 10*3/UL
NRBC BLD AUTO-RTO: 0 /100
PLATELET # BLD AUTO: 270 10E9/L (ref 150–450)
POTASSIUM SERPL-SCNC: 4.5 MMOL/L (ref 3.4–5.3)
PROT SERPL-MCNC: 7.1 G/DL (ref 6.8–8.8)
RBC # BLD AUTO: 4.42 10E12/L (ref 3.8–5.2)
SODIUM SERPL-SCNC: 134 MMOL/L (ref 133–144)
TSH SERPL DL<=0.005 MIU/L-ACNC: 3 MU/L (ref 0.4–4)
WBC # BLD AUTO: 6.2 10E9/L (ref 4–11)

## 2020-11-13 PROCEDURE — 86140 C-REACTIVE PROTEIN: CPT | Performed by: PATHOLOGY

## 2020-11-13 PROCEDURE — 90732 PPSV23 VACC 2 YRS+ SUBQ/IM: CPT | Performed by: INTERNAL MEDICINE

## 2020-11-13 PROCEDURE — 90472 IMMUNIZATION ADMIN EACH ADD: CPT | Performed by: INTERNAL MEDICINE

## 2020-11-13 PROCEDURE — 85025 COMPLETE CBC W/AUTO DIFF WBC: CPT | Performed by: PATHOLOGY

## 2020-11-13 PROCEDURE — G0009 ADMIN PNEUMOCOCCAL VACCINE: HCPCS | Performed by: INTERNAL MEDICINE

## 2020-11-13 PROCEDURE — 80053 COMPREHEN METABOLIC PANEL: CPT | Performed by: PATHOLOGY

## 2020-11-13 PROCEDURE — 82306 VITAMIN D 25 HYDROXY: CPT | Mod: 90 | Performed by: PATHOLOGY

## 2020-11-13 PROCEDURE — 36415 COLL VENOUS BLD VENIPUNCTURE: CPT | Performed by: PATHOLOGY

## 2020-11-13 PROCEDURE — 99214 OFFICE O/P EST MOD 30 MIN: CPT | Mod: 25 | Performed by: INTERNAL MEDICINE

## 2020-11-13 PROCEDURE — 90715 TDAP VACCINE 7 YRS/> IM: CPT | Performed by: INTERNAL MEDICINE

## 2020-11-13 PROCEDURE — 99000 SPECIMEN HANDLING OFFICE-LAB: CPT | Performed by: PATHOLOGY

## 2020-11-13 PROCEDURE — 82728 ASSAY OF FERRITIN: CPT | Performed by: PATHOLOGY

## 2020-11-13 PROCEDURE — 80050 GENERAL HEALTH PANEL: CPT | Performed by: PATHOLOGY

## 2020-11-13 ASSESSMENT — ANXIETY QUESTIONNAIRES
7. FEELING AFRAID AS IF SOMETHING AWFUL MIGHT HAPPEN: NOT AT ALL
GAD7 TOTAL SCORE: 0
6. BECOMING EASILY ANNOYED OR IRRITABLE: NOT AT ALL
5. BEING SO RESTLESS THAT IT IS HARD TO SIT STILL: NOT AT ALL
3. WORRYING TOO MUCH ABOUT DIFFERENT THINGS: NOT AT ALL
IF YOU CHECKED OFF ANY PROBLEMS ON THIS QUESTIONNAIRE, HOW DIFFICULT HAVE THESE PROBLEMS MADE IT FOR YOU TO DO YOUR WORK, TAKE CARE OF THINGS AT HOME, OR GET ALONG WITH OTHER PEOPLE: NOT DIFFICULT AT ALL
1. FEELING NERVOUS, ANXIOUS, OR ON EDGE: NOT AT ALL
2. NOT BEING ABLE TO STOP OR CONTROL WORRYING: NOT AT ALL

## 2020-11-13 ASSESSMENT — PATIENT HEALTH QUESTIONNAIRE - PHQ9
5. POOR APPETITE OR OVEREATING: NOT AT ALL
SUM OF ALL RESPONSES TO PHQ QUESTIONS 1-9: 8

## 2020-11-13 NOTE — PATIENT INSTRUCTIONS
[facility] Medication Refill Request Information:  * Please contact your pharmacy regarding ANY request for medication refills.  ** [facility] Prescription Fax = [phone]  * Please allow 3 business days for routine medication refills.  * Please allow 5 business days for controlled substance medication refills.     [facility] Test Result notification information:  *You will be notified with in 7-10 days of your appointment day regarding the results of your test.  If you are on MyChart you will be notified as soon as the provider has reviewed the results and signed off on them.    [facility]: [phone]     [phone]

## 2020-11-13 NOTE — PROGRESS NOTES
History of Present Illness:  Ms. Welch is a 49 year old female here for follow up of fatigue.  Here with daughter Ibeth, they will have genetic testing and MRI at [facility] for chiari.    Lately has been dealing with chronic fatigue, sleeping a lot, drowsy during the daytime.  Has been worse in last month or so.  Goes to bed around 9:30-10 am - 6:45 am.  No covid in her house.  Disturbed sleep per Apple Watch.  No fevers,   Worse chronic pain, hands and wrists. She did have an occipital bilat C3-5 MBB.  Started taking thyroid medication. Taking vitamin D. Resumed elavil.  She wonders about chronic fatigue syndrome.  Had some HR irregularities while titrating idavrabine.       Detailed Medical History:  -EDS-Type 3 Hypermobility. 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 He reports having a Beighton score of 7-8 out of 9. She reports having genetic testing done, which was questionable for the MYLK Gene. She previously followed with a rheumatologist who is an EDS specialist in [place].  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 Russell, has ILR. Ivadrabine and propronolol helping.  -Cervical instability, chiari malformation:  Saw Dr. Moncada, cervical fusion not recommended, considering occipital blocks. Also traveled to [facility] to see Dr. Andrey Sofia.  -Chronic headaches: Daily tension, cervicogenic, migraine. Has not responded well to injections. Hospitalized in 2017 with normal LP and MRI. At that time responded to DHA.  Had blood patch done 3/19 with good relief of pain. Previously failed amovig and emgality. Blood patch test repeated 9/19. Now managing with medical management, Uses fioricet,maxalt, aleve, tizanidine.  -Hiatal hernia: Recently saw Dr. Mack and had manometry testing done in Feb 2019 which revealed lower esoph pressure, hiatal hernia, possibly short esophagus, peristalsis was preserved. S/p Nissen surgery 2/20.  -Eosinophilic esophagitis: EGD 12/18 performed for food  impaction showed ring at GEJ and mucosal changes c/w EoE. Biopsies 8/18 positive for Eos.  -Lumbar pain: She underwent L4 to S1 fusion in 2013 followed by removal of instrumentation, which did not improve her symptoms. She met with Dr. Martinez in the pain clinic and was taken off high doses of Dilaudid and started on Suboxone in 2017, which dramatically improved her symptoms.  She continues to work with physical therapy twice a week as well as other complementary therapies.   -Chronic Pain: On suboxone per above, as well as occasional oxycodone and ultram. Uses medical cannabis since 2018 which helps.  -IBS, constipation: GES showed rapid emptying 10/17. XR video swallow showed no aspiration 10/17.  -Asthma  -Enlarged cervical lymph nodes: L level 2 node biopsied 10/18 with scant cellularity. Biopsied in past and benign with neg flow cytometry.  -Urinary urge incontinence/incomplete emptying  -Pelvic floor dysfunction, rectocele  -Chronic Pain: Takes butran for leg pain, tramadol for pain flares and medical cannabis, yellow during day and red at night tabs.            Routine Health Maintenence:  Depression Screening:   PHQ-2 ( 1999 Pfizer) 2/14/2019 11/8/2018   Q1: Little interest or pleasure in doing things 0 0   Q2: Feeling down, depressed or hopeless 0 0   PHQ-2 Score 0 0   Q1: Little interest or pleasure in doing things Not at all Not at all   Q2: Feeling down, depressed or hopeless Not at all Not at all   PHQ-2 Score 0 0      Immunizations (zoster, pneumovax, flu, Tdap, Hep A/B):   There is no immunization history on file for this patient.  Lipids:         Recent Labs   Lab Test 04/26/18  1010   CHOL 244*   HDL 48*   *   TRIG 156*      Lung Ca Screening (>30 pk age 55-79 or >20 py age 50-79 + RF): n/a low pack years  Colonoscopy (50-75 yrs): 3/17   - One small hyperplastic polyp in the sigmoid colon, removed with a cold snare. Resected and retrieved. Diverticulosis in the sigmoid colon. Repeat colonoscopy  in10 years for surveillance   Dexa (>65W or 70M yrs): n/a  Mammogram (40-75 yrs): 1/18 ordered, due  Pap (21-65 yrs): 1/18 HPV neg  HIV/HCV if risk factors:  HIV neg 4/18  Tob/EtOH: screen neg  Lifestyle factors: reviewed  Advanced Directive: deferred     Will consider seeign Dr. Baltazar in future.  Otherwise follows with Pain Clinic. Has regimen of medical cannabis, tramadol, butran, TCA, cymbalta and tizanidine. No changes today.    A full 10-pt Review of Systems was performed, verified and is negative except as documented in the HPI.  All health questionnaires were reviewed, verified and relevant information documented above.      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     Cervicalgia      Chronic sinusitis 00/2007    Diagnosed with chronic pansinusitis 2016     CSF leak     Chiari malformation     Depression      Tom-Danlos disease      Eosinophilic esophagitis 08/2018     Gastroesophageal reflux disease 3/1/2017    I have large hiatal hernia     Hiatal hernia 2016    Have adeline hiatel hernia     Hoarseness Unsure    It comes and goes     Hypothyroid      IBS (irritable bowel syndrome) with constipation     improved on Linzess     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     Migraines 1/1/2007    Unsure of exact date     Nasal polyps 2013    Were revoved 03/2017, benign     Orthostatic hypotension      Other nervous system complications 1/2014    Autonomic nervous system disorder, neuralgia, neuropathy     Swelling, mass, or lump in head and neck 08/2016    Lymph node has been growing for last year     Thyroid disease 01/01/2008     Urinary incontinence       Urinary urgency        Active Meds:  Current Outpatient Medications   Medication     albuterol (PROAIR HFA/PROVENTIL HFA/VENTOLIN HFA) 108 (90 BASE) MCG/ACT Inhaler     amitriptyline (ELAVIL) 10 MG tablet     buprenorphine (BUTRANS) 10 MCG/HR WK patch     cetirizine (ZYRTEC) 10 MG tablet     diclofenac (VOLTAREN) 1 % topical gel     DULoxetine (CYMBALTA) 30 MG capsule     EPINEPHrine (EPIPEN/ADRENACLICK/OR ANY BX GENERIC EQUIV) 0.3 MG/0.3ML injection 2-pack     famotidine (PEPCID) 20 MG tablet     ferrous fumarate 65 mg, Andreafski. FE,-Vitamin C 125 mg (VITRON-C)  MG TABS tablet     fluticasone (FLONASE) 50 MCG/ACT nasal spray     ivabradine (CORLANOR) 7.5 MG tablet     levothyroxine (SYNTHROID/LEVOTHROID) 50 MCG tablet     Lidocaine (LIDOCARE) 4 % Patch     linaclotide (LINZESS) 145 MCG capsule     medical cannabis (Patient's own supply.  Not a prescription)     metoclopramide (REGLAN) 10 MG tablet     Multiple Vitamins-Minerals (MULTIVITAMIN PO)     naproxen sodium (ANAPROX) 220 MG tablet     omeprazole (PRILOSEC) 40 MG DR capsule     ondansetron (ZOFRAN-ODT) 4 MG ODT tab     Probiotic Product (PROBIOTIC DAILY PO)     propranolol ER (INDERAL LA) 120 MG 24 hr capsule     rizatriptan (MAXALT-MLT) 5 MG ODT     SUMAtriptan (IMITREX STATDOSE) 6 MG/0.5ML pen injector kit     tiZANidine (ZANAFLEX) 4 MG tablet     traMADol (ULTRAM) 50 MG tablet     vitamin B complex with vitamin C (VITAMIN  B COMPLEX) TABS tablet     VITAMIN D, CHOLECALCIFEROL, PO     naloxone (NARCAN) 4 MG/0.1ML nasal spray     No current facility-administered medications for this visit.         Allergies:  Reviewed, refer to EMR    Relevant Social History:  Social History     Tobacco Use     Smoking status: Former Smoker     Packs/day: 0.20     Years: 10.00     Pack years: 2.00     Types: Cigarettes     Start date: 1991     Quit date: 2013     Years since quittin.9     Smokeless tobacco: Never Used   Substance Use Topics     Alcohol use:  Yes     Comment: occasional - 4 times a year      Drug use: Not Currently     Types: Marijuana     Comment: medical marijuana       Physical Exam:  Vitals: /87   Pulse 66   SpO2 99%   Constitutional: Alert, oriented, pleasant, no acute distress  Head: Normocephalic, atraumatic  Eyes: Extra-ocular movements intact, pupils equally round and reactive bilaterally, no scleral icterus  ENT: wearing mask  Neck: Supple, no lymphadenopathy  Cardiovascular: Regular rate and rhythm, no murmurs, rubs or gallops, peripheral pulses full/symmetric  Respiratory: Good air movement bilaterally, lungs clear, no wheezes/rales/rhonchi  Musculoskeletal: No edema, normal muscle tone, normal gait, Spurlings positive on L, bilat hand  reduced  Neurologic: Alert and oriented, cranial nerves 2-12 intact, grossly non-focal  Skin: No rashes/lesions  Psychiatric: normal mentation, affect and mood        Assessment and Plan:    Ada was seen today for recheck medication.    Diagnoses and all orders for this visit:    Pain in finger of both hands  -     EMG; Future    Need for pneumococcal vaccination  -     Pneumococcal vaccine 23 valent PPSV23  (Pneumovax) [34827]    Need for tetanus booster  -     TDAP VACCINE (Adacel, Boostrix)  [[id number]]    Chronic fatigue  Ddx includes iron deficiency, hypothyroidism, chronic fatigue syndrome, infection, TCA side effects, sleep do. Advised consider trial of elavil or sleep study pending results. Encouraged health diet, daily exercise.  -     CBC with platelets differential; Future  -     TSH with free T4 reflex; Future  -     Vitamin D Deficiency; Future  -     CRP inflammation; Future  -     Ferritin; Future  -     Comprehensive metabolic panel; Future    Chronic intractable headache, unspecified headache type  Following with Neurology, stable on current meds.    Tom-Danlos syndrome  Pain syndrome, chronic  -     EMG; Future    DDD (degenerative disc disease), cervical  May consider  neurosurg referral pending results.  -     EMG; Future    Vitamin D deficiency, unspecified   -     Vitamin D Deficiency; Future    Abnormal finding of blood chemistry, unspecified   -     Ferritin; Future          Return to clinic: karthik Rivera MD  Internal Medicine

## 2020-11-13 NOTE — NURSING NOTE
Chief Complaint   Patient presents with     Recheck Medication     3 month follow up. Chronic fatigue     Kimberly Nissen, EMT at 10:23 AM on 11/13/2020

## 2020-11-14 ASSESSMENT — ANXIETY QUESTIONNAIRES: GAD7 TOTAL SCORE: 0

## 2020-11-16 RX ORDER — BUPRENORPHINE 10 UG/H
PATCH TRANSDERMAL
Qty: 4 PATCH | Refills: 0 | OUTPATIENT
Start: 2020-11-16

## 2020-11-16 RX ORDER — TRAMADOL HYDROCHLORIDE 50 MG/1
TABLET ORAL
Qty: 60 TABLET | Refills: 0 | OUTPATIENT
Start: 2020-11-16

## 2020-12-02 ENCOUNTER — VIRTUAL VISIT (OUTPATIENT)
Dept: GASTROENTEROLOGY | Facility: CLINIC | Age: 50
End: 2020-12-02
Payer: COMMERCIAL

## 2020-12-02 VITALS — HEIGHT: 67 IN | WEIGHT: 170 LBS | BODY MASS INDEX: 26.68 KG/M2

## 2020-12-02 DIAGNOSIS — Q79.60 EHLERS-DANLOS SYNDROME: ICD-10-CM

## 2020-12-02 DIAGNOSIS — K59.02 CONSTIPATION DUE TO OUTLET DYSFUNCTION: Primary | ICD-10-CM

## 2020-12-02 DIAGNOSIS — K44.9 HIATAL HERNIA: ICD-10-CM

## 2020-12-02 PROCEDURE — 99215 OFFICE O/P EST HI 40 MIN: CPT | Mod: 95 | Performed by: INTERNAL MEDICINE

## 2020-12-02 ASSESSMENT — MIFFLIN-ST. JEOR: SCORE: 1433.74

## 2020-12-02 ASSESSMENT — PAIN SCALES - GENERAL: PAINLEVEL: NO PAIN (0)

## 2020-12-02 NOTE — PROGRESS NOTES
"Ada Welch is a 49 year old female who is being evaluated via a billable video visit.        The patient has been notified of following:     \"This video visit will be conducted via a call between you and your physician/provider. We have found that certain health care needs can be provided without the need for an in-person physical exam.  This service lets us provide the care you need with a video conversation.  If a prescription is necessary we can send it directly to your pharmacy.  If lab work is needed we can place an order for that and you can then stop by our lab to have the test done at a later time.    Video visits are billed at different rates depending on your insurance coverage.  Please reach out to your insurance provider with any questions.    If during the course of the call the physician/provider feels a video visit is not appropriate, you will not be charged for this service.\"    Patient has given verbal consent for Video visit? Yes  How would you like to obtain your AVS? MyChart  If you are dropped from the video visit, the video invite should be resent to: Text to cell phone: [phone]  Will anyone else be joining your video visit? No      GI CLINIC VISIT    CC/REFERRING MD:  Jerad Hebert  REASON FOR CONSULTATION:   Jerad Hebert for   Chief Complaint   Patient presents with     Follow Up     follow up     ASSESSMENT/PLAN:  Ms. 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 Of note, she was previously seen for dysphagia and esophageal eosinophilia by Dr. Roland, who thought that the findings may be reflux-related in the setting of a hiatal hernia. EGD that was repeated after she completed an 8 week trial of high dose PPI showed resolution of eosinophils.  She is now s/p nissen fundoplication " "2/2020.    Ada had success with Linzess in the past but did not have success during her most recent trial of this (145 mcg daily).   Her [facility] evaluation was s/f rectal intussusception. She is due for follow up and will likely need surgical correction.     # Esophageal eosinophilia, PPI-responsive  # s/p Nissen fundoplication, 2/2020  Referral to an esophagus specialist     # Rectal intussusception   # Pelvic floor dysfunction  # Tom-Danlos syndrome  ---Increase Linzess to 290 mcg daily   ---Follow up with [facility]    # Colon cancer screening   Given EDS, colonoscopies may be too high risk. Will consider colon cancer screening via stool tests.   ---colon cancer screening via stool tests     RTC: Return in about 3 months (around 3/2/2021).       HPI  Ms. 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  Patient last seen in 2018 with Dr. Griffith.  At the time of consultation, she was in the process of work up for mastocytosis (urine showed elevated histamine levels, but patient was on antacid therapy and Mobic at that time). PMHx also includes 5 vaginal deliveries, migraines, cervicalgia, chronic pain syndrome, degenerative joint disease, chronic pan sinusitis.  She had been referred to the GI clinic for evaluation of possible IBS with mixed stools.     At consultation:  She has had lifelong symptoms of abdominal distension, bloating and borborygmi shortly after eating.  Stools alternates between loose stools and constipation. Usually, she has constipation in the form of hard stools and  incomplete evacuation. Performs splinting (applies posterior vaginal wall pressure with fingers) to help evacuate stool. Had \"pelvic floor releases\" with PT in [place], [place] and \"[facility]\" in [place], [place] with some help. Period of constipation will last for 1 month; during this time, will have 1 BM weekly with Miralax. Endorses lower abdominal pain that worsens with constipation.  Previously failed " Miralax, senna, dulcolax, fiber, suppositories.  Period of constipation is then followed by several days of loose stools. Ms. Welch endorses occasional BRB with straining in the toilet and TP x 20 years. Cscope 3/2017 showed small mouthed diverticulosis in the sigmoid and 1 hyperplastic polyp; repeat cscope in 10 years was the recommendation. Uses buprenorphine patch (opioid) x 3 years. She saw [facility] with recommendation for biofeedback therapy. Rectal intussusception was noted and will likely need surgical repair in the future. Linzess had been very helpful with constipation and bloating and allowed for her to have a daily BM, however she did not return to the clinic to have it refilled and discontinued it.     She had been taking 20 mg prilosec during the day and pepcid at night with adequate heartburn control at consultation.    At her last follow up visit, she developed dysphagia.  This was assessed with EGD that was concerning for EoE grossly. She had been on prilosec 20mg + Pepcid prior to the scope. Had not olga on twice daily PPI prior to scope. Per Dr. Roland's assessment in 2017, the thought was that the eosinophilia (previously seen on prior endoscopy) may be 2/2 reflux in the setting of a hiatal hernia. She continued to have dysphagia to primarily meat, liquids were ok.  She had manometry that was abnormal with weak lower esophageal pressure, hiatal hernia, possible short esophagus though stature of patient needs to be considered. peristalisis preserved. She then had hernia repair, nissen and PEG 2/10/20.    Over all symptoms went well, did not need to use PEG tube and was removed. Still has some issues with solids (mostly meats but can be breads, and need to drink quite a bit in order to help prevent getting stuck), liquids are ok unless she drinks too fast. No vomiting. No heartburn. She continues to take omeprazole 40 mg BID and pepcid at night.      Every time she eats she experiences abdominal discomfort  with cramping that last for hours.  Constantly nauseated. She is not on a bowel regimen (previously had been on linzess with good success).  Over the last couple weeks she has had a BM daily to every other day, however before this time she would go several days without a BM then have several stools in a row.  No blood in the stool.  She started an antiinflammatory diet which for her consists primarily of fruits, vegetables and meats.    She continues to use cannabis nightly for sleep. She takes Reglan BID (prescribed by neurologist and PCP, we discussed possible side effects today and danger of long term use, although none present now), takes naproxen once a week and continues to use buprenorphine pain pain weekly.     Interval history, 12/2020  Fundoplication went well. Feels that heartburn has improved significantly.  Last saw [facility] over a year ago. Was seen in 5/2018 with recommendations for biofeedback therapy and likely surgical correction of rectal intussusception. Ada did not undergo biofeedback therapy.  She had a follow up visit at the [facility] who recommended surgery but this was not scheduled due to prioritization of the nissen and due to other health issues, and then COVID.   In the meantime, restarted Linzess 145 mcg daily in Summer 2020, but this was not helpful.     Currently off Linzess. Alternates between regular BMs x 2 days, then diarrhea.     Had a cscope in 2017 with a small sigmoid polyp, that was hyperplastic.     ROS:    No fevers or chills  No weight loss  No blurry vision, double vision or change in vision  No sore throat  No lymphadenopathy  + chronic migraines   No shortness of breath or wheezing  No chest pain or pressure  No rashes or skin changes  + dysphagia (meats on occasion)  No melena  No dysuria, frequency or urgency  No hot/cold intolerance or polyria  + anxiety or depression    PROBLEM LIST  Patient Active Problem List    Diagnosis Date Noted     Cervical pain 09/29/2020      Priority: Medium     Chronic pain of both shoulders 09/29/2020     Priority: Medium     Cervical facet joint syndrome 09/29/2020     Priority: Medium     Added automatically from request for surgery [id number]       Facet arthropathy, cervical 09/02/2020     Priority: Medium     Added automatically from request for surgery [id number]       Hiatal hernia 02/10/2020     Priority: Medium     s/p hiatal hernia repair, nissen, PEG, 2/10 01/22/2020     Priority: Medium     Added automatically from request for surgery [id number]       POTS (postural orthostatic tachycardia syndrome) 10/23/2019     Priority: Medium     Autonomic dysfunction 06/07/2018     Priority: Medium     Low back pain of multiple sites of spine with sciatica 12/09/2017     Priority: Medium     Intractable chronic migraine without aura and without status migrainosus 12/09/2017     Priority: Medium     Obese 12/09/2017     Priority: Medium     Urinary urgency 08/30/2017     Priority: Medium     Nocturia 08/30/2017     Priority: Medium     Cervicalgia 08/25/2017     Priority: Medium     Headache 07/25/2017     Priority: Medium     Postlaminectomy syndrome, lumbar region 06/14/2017     Priority: Medium     Lymphadenopathy of left cervical region 03/27/2017     Priority: Medium     Easy bruising 10/13/2016     Priority: Medium     Head and neck lymphadenopathy 10/13/2016     Priority: Medium     Hepatomegaly 10/13/2016     Priority: Medium     Liver lesion 10/13/2016     Priority: Medium     Pain syndrome, chronic 09/05/2016     Priority: Medium     s/p TLIF L4-5,L5-S1 with solid arthrodesis 09/05/2016     Priority: Medium     Tom-Danlos syndrome 09/05/2016     Priority: Medium     Acquired hypothyroidism 07/22/2016     Priority: Medium     Anxiety 07/22/2016     Priority: Medium     Opioid dependence (H) 07/22/2016     Priority: Medium     Overview:   Treatment Agreement       Essential hypertension with goal blood pressure less than 140/90 07/22/2016      Priority: Medium     Low back pain 07/12/2016     Priority: Medium     Lumbar radiculopathy 02/18/2016     Priority: Medium     Encounter for genetic counseling and testing 01/05/2016     Priority: Medium     Overview:     [facility] Aortopathy Comprehensive Panel ordered 1/5/2016.       Sinus tachycardia 09/29/2015     Priority: Medium     Astigmatism 12/12/2014     Priority: Medium     Keratoconjunctivitis sicca (H) 12/12/2014     Priority: Medium     Presbyopia 12/12/2014     Priority: Medium     Opioid type dependence, continuous (H) 10/09/2014     Priority: Medium     Other acute postoperative pain 10/09/2014     Priority: Medium     Anxiety disorder 08/21/2014     Priority: Medium     Pain disorder associated with psychological and physical factors 08/21/2014     Priority: Medium     Low back pain radiating to both legs 07/08/2014     Priority: Medium     Arthritis of facet joints at multiple vertebral levels 01/01/2014     Priority: Medium     Spondylolisthesis of lumbar region 12/10/2013     Priority: Medium     Lumbar degenerative disc disease 11/21/2013     Priority: Medium     DNS (deviated nasal septum) 10/09/2012     Priority: Medium     Nasal turbinate hypertrophy 10/09/2012     Priority: Medium     Dysuria 07/12/2011     Priority: Medium     Chronic sinusitis with recurrent bronchitis 02/01/2007     Priority: Medium     Allergic rhinitis 01/09/2007     Priority: Medium     Cyst of fallopian tube 12/28/1996     Priority: Medium     Arthritis involving multiple sites 01/01/1986     Priority: Medium     Uterine endometriosis 01/01/1986     Priority: Medium       PERTINENT PAST MEDICAL HISTORY:  Past Medical History:   Diagnosis Date     Allergic rhinitis 09/2007     Anxiety      Arthritis 11/01/2013    Found during search for cause of back pain     Autoimmune disease (H) 2016    Immunosuppresive     Autonomic dysfunction      Bleeding disorder (H) 2016    Bruise easily     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     Cervicalgia      Chronic sinusitis 00/2007    Diagnosed with chronic pansinusitis 2016     CSF leak     Chiari malformation     Depression      Tom-Danlos disease      Eosinophilic esophagitis 08/2018     Gastroesophageal reflux disease 3/1/2017    I have large hiatal hernia     Hiatal hernia 2016    Have adeline hiatel hernia     Hoarseness Unsure    It comes and goes     Hypothyroid      IBS (irritable bowel syndrome) with constipation     improved on Linzess     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     Migraines 1/1/2007    Unsure of exact date     Nasal polyps 2013    Were revoved 03/2017, benign     Orthostatic hypotension      Other nervous system complications 1/2014    Autonomic nervous system disorder, neuralgia, neuropathy     Swelling, mass, or lump in head and neck 08/2016    Lymph node has been growing for last year     Thyroid disease 01/01/2008     Urinary incontinence      Urinary urgency    Was told she had an ulcer at age 15 in the setting of life stress. Not sure if she had an EGD at that time.       PREVIOUS SURGERIES:  Past Surgical History:   Procedure Laterality Date     AS REPAIR OF NASAL SEPTUM  2009    repair broke nose     BACK SURGERY  12/09/2013  10/01/2014    Spinal fusion L4-S1, L5 moving 5/8ths in. Remove screws/rods     BIOPSY  01/01/2008 & 3/2017    mole biopsy, lymph node biopsy     COLONOSCOPY  3/2017    Colonoscopy and GI     ESOPHAGEAL IMPEDENCE FUNCTION TEST WITH 24 HOUR PH GREATER THAN 1 HOUR N/A 9/17/2019    Procedure: IMPEDANCE PH STUDY, ESOPHAGUS, 24 HOUR;  Surgeon: Raghavendra Grande MD;  Location:  GI     ESOPHAGOSCOPY, GASTROSCOPY, DUODENOSCOPY (EGD), COMBINED N/A 8/3/2018    Procedure: COMBINED ESOPHAGOSCOPY, GASTROSCOPY, DUODENOSCOPY (EGD), BIOPSY SINGLE OR MULTIPLE;;  Surgeon: Juan Woodson MD;  Location:  GI     ESOPHAGOSCOPY, GASTROSCOPY, DUODENOSCOPY (EGD), COMBINED N/A 10/22/2018     Procedure: COMBINED ESOPHAGOSCOPY, GASTROSCOPY, DUODENOSCOPY (EGD), BIOPSY SINGLE OR MULTIPLE;  Surgeon: Sadia Carolina MD;  Location: [facility] GI     ESOPHAGOSCOPY, GASTROSCOPY, DUODENOSCOPY (EGD), COMBINED N/A 12/17/2018    Procedure: COMBINED ESOPHAGOSCOPY, GASTROSCOPY, DUODENOSCOPY (EGD);  Surgeon: Juan Woodson MD;  Location: [facility] GI     INJECT BLOCK MEDIAL BRANCH CERVICAL/THORACIC/LUMBAR Left 9/24/2020    Procedure: BLOCK, NERVE, FACET JOINT, MEDIAL BRANCH, DIAGNOSTIC;  Surgeon: Faiza Martinez MD;  [facility]     INJECT BLOCK MEDIAL BRANCH CERVICAL/THORACIC/LUMBAR Bilateral 10/8/2020    Procedure: Bilateral cervical 3, cervical 4, and cervical 5 medial branch nerve block;  Surgeon: Faiza Martinez MD;  Location: [facility]     LAPAROSCOPIC HERNIORRHAPHY HIATAL N/A 2/10/2020    Procedure: LAPAROSCOPIC HIATAL HERNIA REPAIR, NISSEN FUNDOPLICATION, ESOPHOGASTRODUODENOSCOPY, PEG TUBE PLACEMENT.;  Surgeon: Alana Mack MD;  Location: [facility] OR     NASAL/SINUS POLYPECTOMY  2017    Polyps were benign     NOSE SURGERY  2007    deviated septum     TONSILLECTOMY & ADENOIDECTOMY  1981     TUBAL LIGATION  07/01/2011       PREVIOUS ENDOSCOPY:  EGD 8/2017: findings s/f EoE, 3 cm HH. Path showed 40 eos/HPF in distal and mid esophagus. Mild acute inflammation and occasional eos.   EGD 3/2017: EGD with ringed esophagus with distal bx showing increased eos, min eos in mid esophagus.   cscope 3/2017: small-mouthed, mild diverticulosis, 1 small hyperplastic polyp. Recommended to repeat cscope in 10 years (given path).     ALLERGIES:     Allergies   Allergen Reactions     Bee Venom Shortness Of Breath, Palpitations, Anaphylaxis and Difficulty breathing     Patient does carry Epi-Pen     Chicken-Derived Products (Egg) Nausea and Diarrhea     Compazine [Prochlorperazine]      Extreme jittery     Food      Milk     Gabapentin      Dizzy     Gluten Meal GI Disturbance     Wheat bran and wheat     Pregabalin      Seasonal Allergies       Dust, mold     Topiramate      Pt does not remember reaction       PERTINENT MEDICATIONS:    Current Outpatient Medications:      albuterol (PROAIR HFA/PROVENTIL HFA/VENTOLIN HFA) 108 (90 BASE) MCG/ACT Inhaler, Inhale 2 puffs into the lungs every 6 hours as needed , Disp: , Rfl:      amitriptyline (ELAVIL) 10 MG tablet, Take 3 tablets (30 mg) by mouth At Bedtime, Disp: 90 tablet, Rfl: 1     buprenorphine (BUTRANS) 10 MCG/HR WK patch, Place 1 patch onto the skin once a week, Disp: 4 patch, Rfl: 2     cetirizine (ZYRTEC) 10 MG tablet, Take 10 mg by mouth daily as needed , Disp: , Rfl:      diclofenac (VOLTAREN) 1 % topical gel, Place 2 g onto the skin 4 times daily, Disp: 1 Tube, Rfl: 0     DULoxetine (CYMBALTA) 30 MG capsule, Take 60mg in the morning and 30mg at bedtime, Disp: 270 capsule, Rfl: 3     EPINEPHrine (EPIPEN/ADRENACLICK/OR ANY BX GENERIC EQUIV) 0.3 MG/0.3ML injection 2-pack, INJECT 0.3 MLS (0.3 MG) INTO THE MUSCLE AS NEEDED FOR ANAPHYLAXIS, Disp: , Rfl: 1     famotidine (PEPCID) 20 MG tablet, Take 1 tablet (20 mg) by mouth At Bedtime, Disp: 90 tablet, Rfl: 2     ferrous fumarate 65 mg, [place]. FE,-Vitamin C 125 mg (VITRON-C)  MG TABS tablet, Take 1 tablet by mouth every other day, Disp: 60 tablet, Rfl: 1     fluticasone (FLONASE) 50 MCG/ACT nasal spray, INSTILL 2 SPRAYS INTO BOTH NOSTRILS DAILY, Disp: 48 mL, Rfl: 3     ivabradine (CORLANOR) 7.5 MG tablet, Take 1 tablet (7.5 mg) by mouth 2 times daily (with meals), Disp: 180 tablet, Rfl: 3     levothyroxine (SYNTHROID/LEVOTHROID) 50 MCG tablet, Take 1 tablet (50 mcg) by mouth daily Repeat labs in 2 months, Disp: 90 tablet, Rfl: 1     Lidocaine (LIDOCARE) 4 % Patch, Place 1-3 patches onto the skin every 24 hours To prevent lidocaine toxicity, patient should be patch free for 12 hrs daily., Disp: 20 patch, Rfl: 0     linaclotide (LINZESS) 145 MCG capsule, Take 1 capsule (145 mcg) by mouth every morning (before breakfast), Disp: 90 capsule, Rfl: 3      medical cannabis (Patient's own supply.  Not a prescription), Take 1 Dose by mouth See Admin Instructions (This is NOT a prescription, and does not certify that the patient has a qualifying medical condition for medical cannabis.  The purpose of this order is  to document that the patient reports taking medical cannabis.)   Capsule formulation - uses as needed (currently out of this and not using as of January 28, 2020), Disp: , Rfl:      metoclopramide (REGLAN) 10 MG tablet, Take 1 tablet (10 mg) by mouth 4 times daily as needed (headache), Disp: 40 tablet, Rfl: 3     Multiple Vitamins-Minerals (MULTIVITAMIN PO), Take 1 tablet by mouth daily , Disp: , Rfl:      naloxone (NARCAN) 4 MG/0.1ML nasal spray, Spray 1 spray (4 mg) into one nostril alternating nostrils once as needed for opioid reversal every 2-3 minutes until assistance arrives (Patient not taking: Reported on 11/13/2020), Disp: 0.2 mL, Rfl: 0     naproxen sodium (ANAPROX) 220 MG tablet, Take 440-660 mg by mouth daily as needed for moderate pain, Disp: , Rfl:      omeprazole (PRILOSEC) 40 MG DR capsule, Take 1 capsule (40 mg) by mouth 2 times daily as needed (prn), Disp: 180 capsule, Rfl: 3     ondansetron (ZOFRAN-ODT) 4 MG ODT tab, Take 1-2 tablets (4-8 mg) by mouth every 12 hours as needed for nausea, Disp: 60 tablet, Rfl: 1     Probiotic Product (PROBIOTIC DAILY PO), Take 2 packets by mouth every morning , Disp: , Rfl:      propranolol ER (INDERAL LA) 120 MG 24 hr capsule, Take 1 capsule (120 mg) by mouth daily, Disp: 30 capsule, Rfl: 11     rizatriptan (MAXALT-MLT) 5 MG ODT, TAKE 1-2 TABLETS (5-10 MG) BY MOUTH AT ONSET OF HEADACHE FOR MIGRAINE (MAX 30 MG IN 24 HOURS), Disp: 18 tablet, Rfl: 11     SUMAtriptan (IMITREX STATDOSE) 6 MG/0.5ML pen injector kit, Inject 0.5 ml (6 mg) subcutaneously at onset of migraine, May repeat in 1 hour , Max 12 mg/24 hrs, Disp: 2 kit, Rfl: 11     tiZANidine (ZANAFLEX) 4 MG tablet, Take 1 tablet (4 mg) by mouth 4 times  daily as needed for muscle spasms, Disp: 360 tablet, Rfl: 5     traMADol (ULTRAM) 50 MG tablet, Take 1 tablet (50 mg) by mouth every 6 hours as needed for severe pain, Disp: 60 tablet, Rfl: 2     vitamin B complex with vitamin C (VITAMIN  B COMPLEX) TABS tablet, Take 1 tablet by mouth daily, Disp: , Rfl:      VITAMIN D, CHOLECALCIFEROL, PO, Take 2,000 Units by mouth daily, Disp: , Rfl:     SOCIAL HISTORY:  Social History     Socioeconomic History     Marital status:      Spouse name: Carry     Number of children: 5     Years of education: Not on file     Highest education level: Not on file   Occupational History     Not on file   Social Needs     Financial resource strain: Not on file     Food insecurity     Worry: Not on file     Inability: Not on file     Transportation needs     Medical: Not on file     Non-medical: Not on file   Tobacco Use     Smoking status: Former Smoker     Packs/day: 0.20     Years: 10.00     Pack years: 2.00     Types: Cigarettes     Start date: 1991     Quit date: 2013     Years since quittin.0     Smokeless tobacco: Never Used   Substance and Sexual Activity     Alcohol use: Yes     Comment: occasional - 4 times a year      Drug use: Not Currently     Types: Marijuana     Comment: medical marijuana     Sexual activity: Yes     Partners: Male     Birth control/protection: Female Surgical   Lifestyle     Physical activity     Days per week: Not on file     Minutes per session: Not on file     Stress: Not on file   Relationships     Social connections     Talks on phone: Not on file     Gets together: Not on file     Attends Jainism service: Not on file     Active member of club or organization: Not on file     Attends meetings of clubs or organizations: Not on file     Relationship status: Not on file     Intimate partner violence     Fear of current or ex partner: Not on file     Emotionally abused: Not on file     Physically abused: Not on file     Forced sexual  "activity: Not on file   Other Topics Concern     Parent/sibling w/ CABG, MI or angioplasty before 65F 55M? No   Social History Narrative    5 kids: 26, 25, 21, 15, 8 [place] yo     works at [employer] for disability        5 yo and 6 months grandchildren Robson       FAMILY HISTORY:  Family History   Problem Relation Age of Onset     Connective Tissue Disorder Mother         eds     Connective Tissue Disorder Sister         eds     Cancer Father         Spinal tumor grew into spinal cord, went in brain     Migraines Father      Diabetes Maternal Grandmother         Elderly diabetes     Heart Disease Maternal Grandmother      Hypertension Maternal Grandmother      Diabetes Paternal Grandmother         Elderly diabetes     Diabetes Paternal Grandfather         Elderly diabetes     Asthma Sister         Pediatric Asthma     Migraines Sister      Asthma Son         Pediatric Asthma     Thyroid Disease Sister         ,     Migraines Sister      Migraines Sister      Breast Cancer No family hx of        Past/family/social history reviewed and no changes    PHYSICAL EXAMINATION:  Constitutional: aaox3, cooperative, pleasant, not dyspneic/diaphoretic, no acute distress  Vitals reviewed: Ht 1.702 m (5' 7\")   Wt 77.1 kg (170 lb)   BMI 26.63 kg/m    Wt:   Wt Readings from Last 2 Encounters:   12/02/20 77.1 kg (170 lb)   10/08/20 74.8 kg (165 lb)        General appearance  Healthy appearing adult, in no acute distress     Eyes  Sclera anicteric  Pupils round and reactive to light     Ears, nose, mouth and throat  No obvious external lesions of ears and nose  Hearing intact     Neck  Symmetric  No obvious external lesions     Respiratory  Normal respiration, no use of accessory muscles      MSK  Gait normal     Skin  No rashes or jaundice      Psychiatric  Oriented to person, place and time  Appropriate mood and affect.     PERTINENT STUDIES:  Labs 11/2020 normal   GES 10/2017: rapid gastric " emptying  Video swallow: mod HH (mixed type), deep penetration without aspiration.     Orders Only on 10/27/2017   Component Date Value Ref Range Status     Specimen Description 10/27/2017 Midstream Urine   Final     Special Requests 10/27/2017 Specimen received in preservative   Final     Culture Micro 10/27/2017 *  Final                    Value:10,000 to 50,000 colonies/mL  Escherichia coli       Color Urine 10/27/2017 [place]   Final     Appearance Urine 10/27/2017 Slightly Cloudy   Final     Glucose Urine 10/27/2017 Negative  NEG^Negative mg/dL Final     Bilirubin Urine 10/27/2017 Negative  NEG^Negative Final     Ketones Urine 10/27/2017 Negative  NEG^Negative mg/dL Final     Specific Gravity Urine 10/27/2017 1.021  1.003 - 1.035 Final     Blood Urine 10/27/2017 Negative  NEG^Negative Final     pH Urine 10/27/2017 5.0  5.0 - 7.0 pH Final     Protein Albumin Urine 10/27/2017 Negative  NEG^Negative mg/dL Final     Urobilinogen mg/dL 10/27/2017 2.0  0.0 - 2.0 mg/dL Final     Nitrite Urine 10/27/2017 Negative  NEG^Negative Final     Leukocyte Esterase Urine 10/27/2017 Negative  NEG^Negative Final     Source 10/27/2017 Midstream Urine   Final     WBC Urine 10/27/2017 32* 0 - 2 /HPF Final     RBC Urine 10/27/2017 3* 0 - 2 /HPF Final     Squamous Epithelial /HPF Urine 10/27/2017 15* 0 - 1 /HPF Final     Transitional Epi 10/27/2017 1* FEW^Few /HPF Final     Mucous Urine 10/27/2017 Present* NEG^Negative /LPF Final       Video-Visit Details    Type of service:  Video Visit    Video Start Time: 10:25 AM  Video End Time: 11:15 AM    Originating Location (pt. Location): Home    Distant Location (provider location):  [facility] GASTR[facility]     Platform used for Video Visit: [facility]

## 2020-12-02 NOTE — PATIENT INSTRUCTIONS
PLAN  ---Increase Linzess to 290 mcg daily   ---Esophagus specialist referral  ---colon cancer screening via stool tests   ---Follow up with [facility]

## 2020-12-02 NOTE — LETTER
"  12/2/2020       RE: Ada Welch  [address]  [place] [address]      Dear Colleague,    Thank you for referring your patient, Ada Welch, to the [facility] [place]. Please see a copy of my visit note below.    Ada Welch is a 49 year old female who is being evaluated via a billable video visit.        The patient has been notified of following:     \"This video visit will be conducted via a call between you and your physician/provider. We have found that certain health care needs can be provided without the need for an in-person physical exam.  This service lets us provide the care you need with a video conversation.  If a prescription is necessary we can send it directly to your pharmacy.  If lab work is needed we can place an order for that and you can then stop by our lab to have the test done at a later time.    Video visits are billed at different rates depending on your insurance coverage.  Please reach out to your insurance provider with any questions.    If during the course of the call the physician/provider feels a video visit is not appropriate, you will not be charged for this service.\"    Patient has given verbal consent for Video visit? Yes  How would you like to obtain your AVS? MyChart  If you are dropped from the video visit, the video invite should be resent to: Text to cell phone: [phone]  Will anyone else be joining your video visit? No        GI CLINIC VISIT    CC/REFERRING MD:  Jerad Hebert  REASON FOR CONSULTATION:   Jerad Hebert for   Chief Complaint   Patient presents with     Follow Up     follow up     ASSESSMENT/PLAN:  Ms. 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 Of note, she was previously seen for dysphagia and esophageal eosinophilia " "by Dr. Roland, who thought that the findings may be reflux-related in the setting of a hiatal hernia. EGD that was repeated after she completed an 8 week trial of high dose PPI showed resolution of eosinophils.  She is now s/p nissen fundoplication 2/2020.    Ada had success with Linzess in the past but did not have success during her most recent trial of this (145 mcg daily).   Her [facility] evaluation was s/f rectal intussusception. She is due for follow up and will likely need surgical correction.     # Esophageal eosinophilia, PPI-responsive  # s/p Nissen fundoplication, 2/2020  Referral to an esophagus specialist     # Rectal intussusception   # Pelvic floor dysfunction  # Tom-Danlos syndrome  ---Increase Linzess to 290 mcg daily   ---Follow up with [facility]    # Colon cancer screening   Given EDS, colonoscopies may be too high risk. Will consider colon cancer screening via stool tests.   ---colon cancer screening via stool tests     RTC: Return in about 3 months (around 3/2/2021).     HPI  Ms. 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  Patient last seen in 2018 with Dr. Griffith.  At the time of consultation, she was in the process of work up for mastocytosis (urine showed elevated histamine levels, but patient was on antacid therapy and Mobic at that time). PMHx also includes 5 vaginal deliveries, migraines, cervicalgia, chronic pain syndrome, degenerative joint disease, chronic pan sinusitis.  She had been referred to the GI clinic for evaluation of possible IBS with mixed stools.     At consultation:  She has had lifelong symptoms of abdominal distension, bloating and borborygmi shortly after eating.  Stools alternates between loose stools and constipation. Usually, she has constipation in the form of hard stools and  incomplete evacuation. Performs splinting (applies posterior vaginal wall pressure with fingers) to help evacuate stool. Had \"pelvic floor releases\" with PT in " "[place] and [facility] in [place], [place] with some help. Period of constipation will last for 1 month; during this time, will have 1 BM weekly with Miralax. Endorses lower abdominal pain that worsens with constipation.  Previously failed Miralax, senna, dulcolax, fiber, suppositories.  Period of constipation is then followed by several days of loose stools. Ms. Welch endorses occasional BRB with straining in the toilet and TP x 20 years. Cscope 3/2017 showed small mouthed diverticulosis in the sigmoid and 1 hyperplastic polyp; repeat cscope in 10 years was the recommendation. Uses buprenorphine patch (opioid) x 3 years. She saw [facility] with recommendation for biofeedback therapy. Rectal intussusception was noted and will likely need surgical repair in the future. Linzess had been very helpful with constipation and bloating and allowed for her to have a daily BM, however she did not return to the clinic to have it refilled and discontinued it.     She had been taking 20 mg prilosec during the day and pepcid at night with adequate heartburn control at consultation.    At her last follow up visit, she developed dysphagia.  This was assessed with EGD that was concerning for EoE grossly. She had been on prilosec 20mg + Pepcid prior to the scope. Had not olga on twice daily PPI prior to scope. Per Dr. Roland's assessment in 2017, the thought was that the eosinophilia (previously seen on prior endoscopy) may be 2/2 reflux in the setting of a hiatal hernia. She continued to have dysphagia to primarily meat, liquids were ok.  She had manometry that was abnormal with weak lower esophageal pressure, hiatal hernia, possible short esophagus though stature of patient needs to be considered. peristalisis preserved. She then had hernia repair, nissen and PEG 2/10/20.    Over all symptoms went well, did not need to use PEG tube and was removed. Still has some issues with solids (mostly meats but can be breads, and need to drink quite " a bit in order to help prevent getting stuck), liquids are ok unless she drinks too fast. No vomiting. No heartburn. She continues to take omeprazole 40 mg BID and pepcid at night.      Every time she eats she experiences abdominal discomfort with cramping that last for hours.  Constantly nauseated. She is not on a bowel regimen (previously had been on linzess with good success).  Over the last couple weeks she has had a BM daily to every other day, however before this time she would go several days without a BM then have several stools in a row.  No blood in the stool.  She started an antiinflammatory diet which for her consists primarily of fruits, vegetables and meats.    She continues to use cannabis nightly for sleep. She takes Reglan BID (prescribed by neurologist and PCP, we discussed possible side effects today and danger of long term use, although none present now), takes naproxen once a week and continues to use buprenorphine pain pain weekly.     Interval history, 12/2020  Fundoplication went well. Feels that heartburn has improved significantly.  Last saw [facility] over a year ago. Was seen in 5/2018 with recommendations for biofeedback therapy and likely surgical correction of rectal intussusception. Ada did not undergo biofeedback therapy.  She had a follow up visit at the [facility] who recommended surgery but this was not scheduled due to prioritization of the nissen and due to other health issues, and then COVID.   In the meantime, restarted Linzess 145 mcg daily in Summer 2020, but this was not helpful.     Currently off Linzess. Alternates between regular BMs x 2 days, then diarrhea.     Had a cscope in 2017 with a small sigmoid polyp, that was hyperplastic.     ROS:    No fevers or chills  No weight loss  No blurry vision, double vision or change in vision  No sore throat  No lymphadenopathy  + chronic migraines   No shortness of breath or wheezing  No chest pain or pressure  No rashes or skin changes  +  dysphagia (meats on occasion)  No melena  No dysuria, frequency or urgency  No hot/cold intolerance or polyria  + anxiety or depression    PROBLEM LIST  Patient Active Problem List    Diagnosis Date Noted     Cervical pain 09/29/2020     Priority: Medium     Chronic pain of both shoulders 09/29/2020     Priority: Medium     Cervical facet joint syndrome 09/29/2020     Priority: Medium     Added automatically from request for surgery [id number]       Facet arthropathy, cervical 09/02/2020     Priority: Medium     Added automatically from request for surgery [id number]       Hiatal hernia 02/10/2020     Priority: Medium     s/p hiatal hernia repair, nissen, PEG, 2/10 01/22/2020     Priority: Medium     Added automatically from request for surgery [id number]       POTS (postural orthostatic tachycardia syndrome) 10/23/2019     Priority: Medium     Autonomic dysfunction 06/07/2018     Priority: Medium     Low back pain of multiple sites of spine with sciatica 12/09/2017     Priority: Medium     Intractable chronic migraine without aura and without status migrainosus 12/09/2017     Priority: Medium     Obese 12/09/2017     Priority: Medium     Urinary urgency 08/30/2017     Priority: Medium     Nocturia 08/30/2017     Priority: Medium     Cervicalgia 08/25/2017     Priority: Medium     Headache 07/25/2017     Priority: Medium     Postlaminectomy syndrome, lumbar region 06/14/2017     Priority: Medium     Lymphadenopathy of left cervical region 03/27/2017     Priority: Medium     Easy bruising 10/13/2016     Priority: Medium     Head and neck lymphadenopathy 10/13/2016     Priority: Medium     Hepatomegaly 10/13/2016     Priority: Medium     Liver lesion 10/13/2016     Priority: Medium     Pain syndrome, chronic 09/05/2016     Priority: Medium     s/p TLIF L4-5,L5-S1 with solid arthrodesis 09/05/2016     Priority: Medium     Tom-Danlos syndrome 09/05/2016     Priority: Medium     Acquired hypothyroidism 07/22/2016     Priority:  Medium     Anxiety 07/22/2016     Priority: Medium     Opioid dependence (H) 07/22/2016     Priority: Medium     Overview:   Treatment Agreement       Essential hypertension with goal blood pressure less than 140/90 07/22/2016     Priority: Medium     Low back pain 07/12/2016     Priority: Medium     Lumbar radiculopathy 02/18/2016     Priority: Medium     Encounter for genetic counseling and testing 01/05/2016     Priority: Medium     Overview:     [facility] Aortopathy Comprehensive Panel ordered 1/5/2016.       Sinus tachycardia 09/29/2015     Priority: Medium     Astigmatism 12/12/2014     Priority: Medium     Keratoconjunctivitis sicca (H) 12/12/2014     Priority: Medium     Presbyopia 12/12/2014     Priority: Medium     Opioid type dependence, continuous (H) 10/09/2014     Priority: Medium     Other acute postoperative pain 10/09/2014     Priority: Medium     Anxiety disorder 08/21/2014     Priority: Medium     Pain disorder associated with psychological and physical factors 08/21/2014     Priority: Medium     Low back pain radiating to both legs 07/08/2014     Priority: Medium     Arthritis of facet joints at multiple vertebral levels 01/01/2014     Priority: Medium     Spondylolisthesis of lumbar region 12/10/2013     Priority: Medium     Lumbar degenerative disc disease 11/21/2013     Priority: Medium     DNS (deviated nasal septum) 10/09/2012     Priority: Medium     Nasal turbinate hypertrophy 10/09/2012     Priority: Medium     Dysuria 07/12/2011     Priority: Medium     Chronic sinusitis with recurrent bronchitis 02/01/2007     Priority: Medium     Allergic rhinitis 01/09/2007     Priority: Medium     Cyst of fallopian tube 12/28/1996     Priority: Medium     Arthritis involving multiple sites 01/01/1986     Priority: Medium     Uterine endometriosis 01/01/1986     Priority: Medium       PERTINENT PAST MEDICAL HISTORY:  Past Medical History:   Diagnosis Date     Allergic rhinitis 09/2007     Anxiety       Arthritis 11/01/2013    Found during search for cause of back pain     Autoimmune disease (H) 2016    Immunosuppresive     Autonomic dysfunction      Bleeding disorder (H) 2016    Bruise easily     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     Cervicalgia      Chronic sinusitis 00/2007    Diagnosed with chronic pansinusitis 2016     CSF leak     Chiari malformation     Depression      Tom-Danlos disease      Eosinophilic esophagitis 08/2018     Gastroesophageal reflux disease 3/1/2017    I have large hiatal hernia     Hiatal hernia 2016    Have adeline hiatel hernia     Hoarseness Unsure    It comes and goes     Hypothyroid      IBS (irritable bowel syndrome) with constipation     improved on Linzess     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     Migraines 1/1/2007    Unsure of exact date     Nasal polyps 2013    Were revoved 03/2017, benign     Orthostatic hypotension      Other nervous system complications 1/2014    Autonomic nervous system disorder, neuralgia, neuropathy     Swelling, mass, or lump in head and neck 08/2016    Lymph node has been growing for last year     Thyroid disease 01/01/2008     Urinary incontinence      Urinary urgency    Was told she had an ulcer at age 15 in the setting of life stress. Not sure if she had an EGD at that time.       PREVIOUS SURGERIES:  Past Surgical History:   Procedure Laterality Date     AS REPAIR OF NASAL SEPTUM  2009    repair broke nose     BACK SURGERY  12/09/2013  10/01/2014    Spinal fusion L4-S1, L5 moving 5/8ths in. Remove screws/rods     BIOPSY  01/01/2008 & 3/2017    mole biopsy, lymph node biopsy     COLONOSCOPY  3/2017    Colonoscopy and GI     ESOPHAGEAL IMPEDENCE FUNCTION TEST WITH 24 HOUR PH GREATER THAN 1 HOUR N/A 9/17/2019    Procedure: IMPEDANCE PH STUDY, ESOPHAGUS, 24 HOUR;  Surgeon: Raghavendra Grande MD;  Location:  GI     ESOPHAGOSCOPY, GASTROSCOPY, DUODENOSCOPY (EGD), COMBINED N/A 8/3/2018     Procedure: COMBINED ESOPHAGOSCOPY, GASTROSCOPY, DUODENOSCOPY (EGD), BIOPSY SINGLE OR MULTIPLE;;  Surgeon: Juan Woodson MD;  Location: [facility] GI     ESOPHAGOSCOPY, GASTROSCOPY, DUODENOSCOPY (EGD), COMBINED N/A 10/22/2018    Procedure: COMBINED ESOPHAGOSCOPY, GASTROSCOPY, DUODENOSCOPY (EGD), BIOPSY SINGLE OR MULTIPLE;  Surgeon: Sadia Carolina MD;  Location: [facility] GI     ESOPHAGOSCOPY, GASTROSCOPY, DUODENOSCOPY (EGD), COMBINED N/A 12/17/2018    Procedure: COMBINED ESOPHAGOSCOPY, GASTROSCOPY, DUODENOSCOPY (EGD);  Surgeon: Juan Woodson MD;  Location: [facility] GI     INJECT BLOCK MEDIAL BRANCH CERVICAL/THORACIC/LUMBAR Left 9/24/2020    Procedure: BLOCK, NERVE, FACET JOINT, MEDIAL BRANCH, DIAGNOSTIC;  Surgeon: Faiza Martinez MD;  [facility]     INJECT BLOCK MEDIAL BRANCH CERVICAL/THORACIC/LUMBAR Bilateral 10/8/2020    Procedure: Bilateral cervical 3, cervical 4, and cervical 5 medial branch nerve block;  Surgeon: Faiza Martinez MD;  Location: [facility] OR     LAPAROSCOPIC HERNIORRHAPHY HIATAL N/A 2/10/2020    Procedure: LAPAROSCOPIC HIATAL HERNIA REPAIR, NISSEN FUNDOPLICATION, ESOPHOGASTRODUODENOSCOPY, PEG TUBE PLACEMENT.;  Surgeon: Alana Mack MD;  Location: [facility] OR     NASAL/SINUS POLYPECTOMY  2017    Polyps were benign     NOSE SURGERY  2007    deviated septum     TONSILLECTOMY & ADENOIDECTOMY  1981     TUBAL LIGATION  07/01/2011       PREVIOUS ENDOSCOPY:  EGD 8/2017: findings s/f EoE, 3 cm HH. Path showed 40 eos/HPF in distal and mid esophagus. Mild acute inflammation and occasional eos.   EGD 3/2017: EGD with ringed esophagus with distal bx showing increased eos, min eos in mid esophagus.   cscope 3/2017: small-mouthed, mild diverticulosis, 1 small hyperplastic polyp. Recommended to repeat cscope in 10 years (given path).     ALLERGIES:     Allergies   Allergen Reactions     Bee Venom Shortness Of Breath, Palpitations, Anaphylaxis and Difficulty breathing     Patient does carry Epi-Pen     Chicken-Derived Products  (Egg) Nausea and Diarrhea     Compazine [Prochlorperazine]      Extreme jittery     Food      Milk     Gabapentin      Dizzy     Gluten Meal GI Disturbance     Wheat bran and wheat     Pregabalin      Seasonal Allergies      Dust, mold     Topiramate      Pt does not remember reaction       PERTINENT MEDICATIONS:    Current Outpatient Medications:      albuterol (PROAIR HFA/PROVENTIL HFA/VENTOLIN HFA) 108 (90 BASE) MCG/ACT Inhaler, Inhale 2 puffs into the lungs every 6 hours as needed , Disp: , Rfl:      amitriptyline (ELAVIL) 10 MG tablet, Take 3 tablets (30 mg) by mouth At Bedtime, Disp: 90 tablet, Rfl: 1     buprenorphine (BUTRANS) 10 MCG/HR WK patch, Place 1 patch onto the skin once a week, Disp: 4 patch, Rfl: 2     cetirizine (ZYRTEC) 10 MG tablet, Take 10 mg by mouth daily as needed , Disp: , Rfl:      diclofenac (VOLTAREN) 1 % topical gel, Place 2 g onto the skin 4 times daily, Disp: 1 Tube, Rfl: 0     DULoxetine (CYMBALTA) 30 MG capsule, Take 60mg in the morning and 30mg at bedtime, Disp: 270 capsule, Rfl: 3     EPINEPHrine (EPIPEN/ADRENACLICK/OR ANY BX GENERIC EQUIV) 0.3 MG/0.3ML injection 2-pack, INJECT 0.3 MLS (0.3 MG) INTO THE MUSCLE AS NEEDED FOR ANAPHYLAXIS, Disp: , Rfl: 1     famotidine (PEPCID) 20 MG tablet, Take 1 tablet (20 mg) by mouth At Bedtime, Disp: 90 tablet, Rfl: 2     ferrous fumarate 65 mg, [place]. FE,-Vitamin C 125 mg (VITRON-C)  MG TABS tablet, Take 1 tablet by mouth every other day, Disp: 60 tablet, Rfl: 1     fluticasone (FLONASE) 50 MCG/ACT nasal spray, INSTILL 2 SPRAYS INTO BOTH NOSTRILS DAILY, Disp: 48 mL, Rfl: 3     ivabradine (CORLANOR) 7.5 MG tablet, Take 1 tablet (7.5 mg) by mouth 2 times daily (with meals), Disp: 180 tablet, Rfl: 3     levothyroxine (SYNTHROID/LEVOTHROID) 50 MCG tablet, Take 1 tablet (50 mcg) by mouth daily Repeat labs in 2 months, Disp: 90 tablet, Rfl: 1     Lidocaine (LIDOCARE) 4 % Patch, Place 1-3 patches onto the skin every 24 hours To prevent lidocaine  toxicity, patient should be patch free for 12 hrs daily., Disp: 20 patch, Rfl: 0     linaclotide (LINZESS) 145 MCG capsule, Take 1 capsule (145 mcg) by mouth every morning (before breakfast), Disp: 90 capsule, Rfl: 3     medical cannabis (Patient's own supply.  Not a prescription), Take 1 Dose by mouth See Admin Instructions (This is NOT a prescription, and does not certify that the patient has a qualifying medical condition for medical cannabis.  The purpose of this order is  to document that the patient reports taking medical cannabis.)   Capsule formulation - uses as needed (currently out of this and not using as of January 28, 2020), Disp: , Rfl:      metoclopramide (REGLAN) 10 MG tablet, Take 1 tablet (10 mg) by mouth 4 times daily as needed (headache), Disp: 40 tablet, Rfl: 3     Multiple Vitamins-Minerals (MULTIVITAMIN PO), Take 1 tablet by mouth daily , Disp: , Rfl:      naloxone (NARCAN) 4 MG/0.1ML nasal spray, Spray 1 spray (4 mg) into one nostril alternating nostrils once as needed for opioid reversal every 2-3 minutes until assistance arrives (Patient not taking: Reported on 11/13/2020), Disp: 0.2 mL, Rfl: 0     naproxen sodium (ANAPROX) 220 MG tablet, Take 440-660 mg by mouth daily as needed for moderate pain, Disp: , Rfl:      omeprazole (PRILOSEC) 40 MG DR capsule, Take 1 capsule (40 mg) by mouth 2 times daily as needed (prn), Disp: 180 capsule, Rfl: 3     ondansetron (ZOFRAN-ODT) 4 MG ODT tab, Take 1-2 tablets (4-8 mg) by mouth every 12 hours as needed for nausea, Disp: 60 tablet, Rfl: 1     Probiotic Product (PROBIOTIC DAILY PO), Take 2 packets by mouth every morning , Disp: , Rfl:      propranolol ER (INDERAL LA) 120 MG 24 hr capsule, Take 1 capsule (120 mg) by mouth daily, Disp: 30 capsule, Rfl: 11     rizatriptan (MAXALT-MLT) 5 MG ODT, TAKE 1-2 TABLETS (5-10 MG) BY MOUTH AT ONSET OF HEADACHE FOR MIGRAINE (MAX 30 MG IN 24 HOURS), Disp: 18 tablet, Rfl: 11     SUMAtriptan (IMITREX STATDOSE) 6  MG/0.5ML pen injector kit, Inject 0.5 ml (6 mg) subcutaneously at onset of migraine, May repeat in 1 hour , Max 12 mg/24 hrs, Disp: 2 kit, Rfl: 11     tiZANidine (ZANAFLEX) 4 MG tablet, Take 1 tablet (4 mg) by mouth 4 times daily as needed for muscle spasms, Disp: 360 tablet, Rfl: 5     traMADol (ULTRAM) 50 MG tablet, Take 1 tablet (50 mg) by mouth every 6 hours as needed for severe pain, Disp: 60 tablet, Rfl: 2     vitamin B complex with vitamin C (VITAMIN  B COMPLEX) TABS tablet, Take 1 tablet by mouth daily, Disp: , Rfl:      VITAMIN D, CHOLECALCIFEROL, PO, Take 2,000 Units by mouth daily, Disp: , Rfl:     SOCIAL HISTORY:  Social History     Socioeconomic History     Marital status:      Spouse name: Carry     Number of children: 5     Years of education: Not on file     Highest education level: Not on file   Occupational History     Not on file   Social Needs     Financial resource strain: Not on file     Food insecurity     Worry: Not on file     Inability: Not on file     Transportation needs     Medical: Not on file     Non-medical: Not on file   Tobacco Use     Smoking status: Former Smoker     Packs/day: 0.20     Years: 10.00     Pack years: 2.00     Types: Cigarettes     Start date: 1991     Quit date: 2013     Years since quittin.0     Smokeless tobacco: Never Used   Substance and Sexual Activity     Alcohol use: Yes     Comment: occasional - 4 times a year      Drug use: Not Currently     Types: Marijuana     Comment: medical marijuana     Sexual activity: Yes     Partners: Male     Birth control/protection: Female Surgical   Lifestyle     Physical activity     Days per week: Not on file     Minutes per session: Not on file     Stress: Not on file   Relationships     Social connections     Talks on phone: Not on file     Gets together: Not on file     Attends Christianity service: Not on file     Active member of club or organization: Not on file     Attends meetings of clubs or  "organizations: Not on file     Relationship status: Not on file     Intimate partner violence     Fear of current or ex partner: Not on file     Emotionally abused: Not on file     Physically abused: Not on file     Forced sexual activity: Not on file   Other Topics Concern     Parent/sibling w/ CABG, MI or angioplasty before 65F 55M? No   Social History Narrative    5 kids: 26, 25, 21, 15, 8 [place] yo     works at [employer] for disability        5 yo and 6 months grandchildren Marcelo and Jennifer       FAMILY HISTORY:  Family History   Problem Relation Age of Onset     Connective Tissue Disorder Mother         eds     Connective Tissue Disorder Sister         eds     Cancer Father         Spinal tumor grew into spinal cord, went in brain     Migraines Father      Diabetes Maternal Grandmother         Elderly diabetes     Heart Disease Maternal Grandmother      Hypertension Maternal Grandmother      Diabetes Paternal Grandmother         Elderly diabetes     Diabetes Paternal Grandfather         Elderly diabetes     Asthma Sister         Pediatric Asthma     Migraines Sister      Asthma Son         Pediatric Asthma     Thyroid Disease Sister         ,     Migraines Sister      Migraines Sister      Breast Cancer No family hx of        Past/family/social history reviewed and no changes    PHYSICAL EXAMINATION:  Constitutional: aaox3, cooperative, pleasant, not dyspneic/diaphoretic, no acute distress  Vitals reviewed: Ht 1.702 m (5' 7\")   Wt 77.1 kg (170 lb)   BMI 26.63 kg/m    Wt:   Wt Readings from Last 2 Encounters:   12/02/20 77.1 kg (170 lb)   10/08/20 74.8 kg (165 lb)        General appearance  Healthy appearing adult, in no acute distress     Eyes  Sclera anicteric  Pupils round and reactive to light     Ears, nose, mouth and throat  No obvious external lesions of ears and nose  Hearing intact     Neck  Symmetric  No obvious external lesions     Respiratory  Normal respiration, no use of " accessory muscles      MSK  Gait normal     Skin  No rashes or jaundice      Psychiatric  Oriented to person, place and time  Appropriate mood and affect.     PERTINENT STUDIES:  Labs 11/2020 normal   GES 10/2017: rapid gastric emptying  Video swallow: mod HH (mixed type), deep penetration without aspiration.     Orders Only on 10/27/2017   Component Date Value Ref Range Status     Specimen Description 10/27/2017 Midstream Urine   Final     Special Requests 10/27/2017 Specimen received in preservative   Final     Culture Micro 10/27/2017 *  Final                    Value:10,000 to 50,000 colonies/mL  Escherichia coli       Color Urine 10/27/2017 [place]   Final     Appearance Urine 10/27/2017 Slightly Cloudy   Final     Glucose Urine 10/27/2017 Negative  NEG^Negative mg/dL Final     Bilirubin Urine 10/27/2017 Negative  NEG^Negative Final     Ketones Urine 10/27/2017 Negative  NEG^Negative mg/dL Final     Specific Gravity Urine 10/27/2017 1.021  1.003 - 1.035 Final     Blood Urine 10/27/2017 Negative  NEG^Negative Final     pH Urine 10/27/2017 5.0  5.0 - 7.0 pH Final     Protein Albumin Urine 10/27/2017 Negative  NEG^Negative mg/dL Final     Urobilinogen mg/dL 10/27/2017 2.0  0.0 - 2.0 mg/dL Final     Nitrite Urine 10/27/2017 Negative  NEG^Negative Final     Leukocyte Esterase Urine 10/27/2017 Negative  NEG^Negative Final     Source 10/27/2017 Midstream Urine   Final     WBC Urine 10/27/2017 32* 0 - 2 /HPF Final     RBC Urine 10/27/2017 3* 0 - 2 /HPF Final     Squamous Epithelial /HPF Urine 10/27/2017 15* 0 - 1 /HPF Final     Transitional Epi 10/27/2017 1* FEW^Few /HPF Final     Mucous Urine 10/27/2017 Present* NEG^Negative /LPF Final       Video-Visit Details    Type of service:  Video Visit    Video Start Time: 10:25 AM  Video End Time: 11:15 AM    Originating Location (pt. Location): Home    Distant Location (provider location):  [facility]     Platform used for Video  Visit: Suresh Grififth MD

## 2020-12-02 NOTE — NURSING NOTE
"Chief Complaint   Patient presents with     Follow Up     follow up       Vitals:    12/02/20 0957   Weight: 77.1 kg (170 lb)   Height: 1.702 m (5' 7\")       Body mass index is 26.63 kg/m .    Erika Tijerina CMA    "

## 2020-12-13 ENCOUNTER — MYC REFILL (OUTPATIENT)
Dept: ANESTHESIOLOGY | Facility: CLINIC | Age: 50
End: 2020-12-13

## 2020-12-13 DIAGNOSIS — G89.4 CHRONIC PAIN SYNDROME: ICD-10-CM

## 2020-12-13 DIAGNOSIS — M96.1 POSTLAMINECTOMY SYNDROME: ICD-10-CM

## 2020-12-13 RX ORDER — TRAMADOL HYDROCHLORIDE 50 MG/1
50 TABLET ORAL EVERY 6 HOURS PRN
Qty: 60 TABLET | Refills: 2 | Status: CANCELLED | OUTPATIENT
Start: 2020-12-13

## 2020-12-14 ENCOUNTER — TELEPHONE (OUTPATIENT)
Dept: ANESTHESIOLOGY | Facility: CLINIC | Age: 50
End: 2020-12-14

## 2020-12-14 RX ORDER — TRAMADOL HYDROCHLORIDE 50 MG/1
50 TABLET ORAL EVERY 6 HOURS PRN
Qty: 60 TABLET | Refills: 2 | Status: SHIPPED | OUTPATIENT
Start: 2020-12-14 | End: 2021-03-11

## 2020-12-14 RX ORDER — BUPRENORPHINE 10 UG/H
1 PATCH TRANSDERMAL WEEKLY
Qty: 4 PATCH | Refills: 2 | Status: SHIPPED | OUTPATIENT
Start: 2020-12-14 | End: 2021-03-30

## 2020-12-14 NOTE — TELEPHONE ENCOUNTER
Refill request    Medication: buprenorphine (BUTRANS) 10 MCG/HR WK patch       MNPMP Checked: Yes    Last refilled 11/12/20 for 4 patch      Last clinic appointment: 10/08/20  Next clinic appointment: Will reach out to pt to schedule f/u      Preferred pharmacy:    [facility] in [address]       Refill request    Medication: traMADol (ULTRAM) 50 MG tablet      MNPMP Checked: Yes    Last refilled 11/12/20 for 60 tablets      Last clinic appointment: 10/08/20  Next clinic appointment: Will reach out to pt to schedule f/u      Preferred pharmacy:    [facility] in [address]

## 2020-12-14 NOTE — ADDENDUM NOTE
Addended by: GREGORY OLIVER on: 12/14/2020 02:52 PM     Modules accepted: Orders     Duration Of Freeze Thaw-Cycle (Seconds): 0 Post-Care Instructions: I reviewed with the patient in detail post-care instructions. Patient is to wear sunprotection, and avoid picking at any of the treated lesions. Pt may apply Vaseline to crusted or scabbing areas. Render Post-Care Instructions In Note?: no Consent: The patient's consent was obtained including but not limited to risks of crusting, scabbing, blistering, scarring, darker or lighter pigmentary change, recurrence, incomplete removal and infection. Detail Level: Detailed

## 2020-12-15 ENCOUNTER — MYC REFILL (OUTPATIENT)
Dept: ANESTHESIOLOGY | Facility: CLINIC | Age: 50
End: 2020-12-15

## 2020-12-15 DIAGNOSIS — M96.1 POST LAMINECTOMY SYNDROME: ICD-10-CM

## 2020-12-19 DIAGNOSIS — D47.09 MAST CELL DISORDER: ICD-10-CM

## 2020-12-19 DIAGNOSIS — K44.9 HIATAL HERNIA: ICD-10-CM

## 2020-12-22 PROBLEM — Z53.9 ERRONEOUS ENCOUNTER--DISREGARD: Status: ACTIVE | Noted: 2020-12-22

## 2020-12-23 RX ORDER — FAMOTIDINE 20 MG/1
TABLET, FILM COATED ORAL
Qty: 90 TABLET | Refills: 3 | OUTPATIENT
Start: 2020-12-23

## 2021-01-15 ENCOUNTER — HEALTH MAINTENANCE LETTER (OUTPATIENT)
Age: 51
End: 2021-01-15

## 2021-01-21 ENCOUNTER — VIRTUAL VISIT (OUTPATIENT)
Dept: ANESTHESIOLOGY | Facility: CLINIC | Age: 51
End: 2021-01-21
Payer: COMMERCIAL

## 2021-01-21 ENCOUNTER — MYC MEDICAL ADVICE (OUTPATIENT)
Dept: ANESTHESIOLOGY | Facility: CLINIC | Age: 51
End: 2021-01-21

## 2021-01-21 ENCOUNTER — MYC MEDICAL ADVICE (OUTPATIENT)
Dept: INTERNAL MEDICINE | Facility: CLINIC | Age: 51
End: 2021-01-21

## 2021-01-21 DIAGNOSIS — M25.511 CHRONIC RIGHT SHOULDER PAIN: Primary | ICD-10-CM

## 2021-01-21 DIAGNOSIS — G89.29 CHRONIC RIGHT SHOULDER PAIN: Primary | ICD-10-CM

## 2021-01-21 DIAGNOSIS — M54.2 CERVICALGIA: Primary | ICD-10-CM

## 2021-01-21 DIAGNOSIS — Q07.00 ARNOLD-CHIARI DEFORMITY (H): ICD-10-CM

## 2021-01-21 DIAGNOSIS — M47.812 ARTHROPATHY OF CERVICAL FACET JOINT: ICD-10-CM

## 2021-01-21 PROCEDURE — 99213 OFFICE O/P EST LOW 20 MIN: CPT | Mod: 95 | Performed by: ANESTHESIOLOGY

## 2021-01-21 RX ORDER — MELOXICAM 7.5 MG/1
7.5 TABLET ORAL DAILY
Qty: 14 TABLET | Refills: 0 | Status: SHIPPED | OUTPATIENT
Start: 2021-01-21 | End: 2021-03-11

## 2021-01-21 NOTE — PROGRESS NOTES
AVS Sent to the pt's MyChart.   Pt was called and they stated that they have a good understanding of the procedure instructions.     Recommended procedure: Right sided Cervical Medial Branch Blocks    Anticoagulant: Pt denied taking any.     Recommended follow up: 3 months or earlier if indicated.     Pt is requesting to schedule Cervical Radiofrequency Ablation after they finish their Blocks.     Mya Cash LPN

## 2021-01-21 NOTE — PATIENT INSTRUCTIONS
Medications:    meloxicam (MOBIC) 7.5 MG tablet- Take 1 tablet (7.5 mg) by mouth daily for 14 days    Please provide the clinic with a minium of 1 week notice, on all prescription refills.     Referrals:    Physical Therapy Referral placed-   If you have not heard from the scheduling office within 2 business days, please call [phone] for all locations      Imaging:    X ray of right shoulder ordered-     IMAGING SERVICES HOURS:    All imaging modalities are available from 7 a.m. - 9 p.m. Monday through Friday  X-ray, CT, MRI, and General Ultrasound appointments are available from 7 a.m. -3:30 p.m. on Saturdays  X-ray, CT and MRI appointments are available from 8 a.m. - 4:30 p.m. on Sundays  Please call [phone] to schedule imaging exams      Procedures:    Call to schedule your procedure: [phone], option #2    Right sided Cervical Medial Branch Block.     Your pre-procedure instructions are below, please call our clinic if you have any questions.      Recommended Follow up:      3 months with Dr. Martinez, or earlier if indicated.        Please call [phone], option #1 to schedule your clinic appointment if you don't already have an appointment scheduled.    Procedure Information related to COVID-19    Please call [phone] option #2 to schedule, reschedule, or cancel your procedure appointment.   Phones are answered Monday - Friday from 08:00 - 4:30pm.  Leave a voicemail with your name, birth date, and phone number if no one is available to take your call.     You will need to be tested for COVID-19 within 4 days (96 hours) of your procedure.  You will be called to schedule your COVID test by a central scheduling team. If you have not been contacted to schedule your test within 4 days of the scheduled procedure, call [phone]    Please be aware that the turn around time for the test is approximately 24-72 hours.   If your results are still pending the day of your procedure, you will be  notified as soon as possible as the procedure may be cancelled.    Please note: You will only be contacted for positive and pending results.       At this time there are NO VISITORS allowed.  The procedure center staff will call you several days before the procedure to review important information that you will need to know for the day of the procedure.   Please contact the clinic if you have further questions about this information.       Information related to Scheduling and Pre-Procedure Instructions:    After calling to schedule your procedure appointment, please contact the clinic to make your follow up clinic appointment 4 weeks after the procedure.  Clinic phone- [phone], option #1      If you are having a Diagnostic procedure, you will be given a Pain Diary to document your pain relief.   Please fax the completed form to our office.   fax number is [phone]    If you must reschedule your procedure more than two times, you must follow up in clinic before rescheduling again.        Preparing for your procedure    CAUTION - FAILURE TO FOLLOW THESE PRE-PROCEDURE INSTRUCTIONS WILL RESULT IN YOUR PROCEDURE BEING RESCHEDULED.      Your procedure: Right Sided Cervical Medial Branch Blocks.             You must have a  take you home after your procedure. Transportation by taxi or para-transit is okay as long as you have a responsible adult accompany you. You must provide your 's full name and contact number at time of check in.     Fasting Protocol Please have nothing to eat and drink for 2 hours before the procedure.      Medications If you take any medications, DO NOT STOP. Take your medications as usual the day of your procedure with a sip of water AT LEAST 2 HOURS PRIOR TO ARRIVAL.    Antibiotics If you are currently taking antibiotics, you must complete the entire dose 7 days prior to your scheduled procedure. You must be clear of any signs or symptoms of infection. If you begin  antibiotics, please contact our clinic for instructions.     Fever, Chills, or Rash If you experience a fever of higher than 100 degrees, chills, rash, or open wounds during the one week before your procedure, please call the clinic to see if you may proceed with your procedure.      Medication Hold List  **Patients under Cardiology/Neurology care should consult their provider prior to the pain procedure to verify pre-procedure medication instructions. The information below contains general guidelines.**    Blood Thinners If you are taking daily ASPIRIN, PLAVIX, OR OTHER BLOOD THINNERS SUCH AS COUMADIN/WARFARIN, we will need your prescribing doctor to sign a release permitting you to stop these medications. Once approved by your prescribing doctor - STOP ALL BLOOD THINNERS BASED ON THE TIME TABLE BELOW PRIOR TO YOUR PROCEDURE. If you have been instructed to stop WARFARIN(COUMADIN), you must have an INR lab drawn the day before your procedure. . Your INR must be within normal limits before we can perform your injection. MEDICATIONS CAN BE RESTARTED AFTER YOUR PROCEDURE.    14 DAY HOLD  Ticlid (ticlopidine)    10 DAY HOLD  Effient (Prasugel)    3 DAY HOLD  Xarelto (rivaroxaban) 7 DAY HOLD  Anacin, Bufferin, Ecotrin, Excedrin, Aggrenox (Aspirin)  Brilinta (ticagrelor)  Coumadin (Warfarin)  Pradexa (Dabigatran)  Elmiron (Pentosan)  Plavix (Clopidogrel Bisulfate)  Pletal (Cilostazol)    24 HOUR HOLD  Lovenox (enoxaparin)  Agrylin (Anagrelide)        Non-steroidal Anti-inflammatories (NSAIDs) DO NOT TAKE any non-steroidal anti-inflammatory medications (NSAIDs) listed on the table below. MEDICATIONS CAN BE RESTARTED AFTER YOUR PROCEDURE. Celebrex is OK to take and does not need to be discontinued.     Medications to stop:  3 DAY HOLD  Advil, Motrin (Ibuprofen)  Arthrotec (diciofenac sodium/misoprostol)  Clinoril (Sulindac)  Indocin (Indomethacin)  Lodine (Etodolac)  Toradol (Ketorolac)  Vicoprofen (Hydrocodone and  Ibuprofen)  Voltaren (Diclotenac)    14 DAY HOLD  Daypro (Oxaprozin)  Feldene (Piroxicam)   7 DAY HOLD  Aleve (Naproxen sodium)  Darvon compound (contains aspirin)  Naprosyn (Naproxen)  Norgesic Forte (contains aspirin)  Mobic (Meloxicam)  Oruvall (Ketoprofen)  Percodan (contains aspirin)  Relafen (Nabumetone)  Salsalate  Trilisate  Vitamin E (more than 400 mg per day)  Any medication containing aspirin                To speak with a nurse, schedule/reschedule/cancel a clinic appointment, or request a medication refill call: [phone]     You can also reach us by [employer]: [email or web address]

## 2021-01-21 NOTE — PROGRESS NOTES
Video call duration: 20 minutes    Assessment and plan     Ada Welch is a 50 year old female who  is well known to me for multifocal chronic pain. The patient has very complex history of low back pain for ~25 years for which she underwent L4-S1 fusion (TLIF) 12/9/13.  Postoperatively, her back pain worsened (mainly LBP, but also radiated down both lower limbs in an S1 dermatomal distribution).   Underwent removal of posterior instrumentation (screws and guadalupe).  After fusion removal, her pain continued.  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  She even sought care from a EDS specialist in [place].  She is currently on disability.     She has a h/o significant autonomic dysfunction.  Since 2014, she reports that her heart rate has fluctuated from as low as  beats per minutes and her blood pressure also fluctuates at the same time without any aggravating factors.  The fluctuation of the blood pressure and heart rate happens when she is supine, sitting, standing, walking, etc.  She has seen cardiologist Dr. Russell who advised her to continue fludrocortisone, isometric exercise, low carbohydrate and low gluten diet.     She has been seeing Dr. Singh for urinary urgency and incontinence.  She is advised for improving lifestyle, dietary modification, optimizing bowel regimen and to start pelvic physical therapy.     She has seen Dr. Moncada for left upper extremity radicular pain along the C8 nerve root distribution.  Per Dr. Moncada's note, she does not have a surgical target which may explain her symptoms.  She still continues to report this symptom.  We discussed about epidural steroid injection, however she is reluctant to perform the procedures and states she previously had a bad reaction with a steroid.   Patient also had cervical medial branch nerve block with us 50% pain relief.  2nd nerve block is not yet scheduled.      The patient has seen Dr. Mack for eosinophilic esophagitis, GERD, and  dysphagia.    She had open hernia surgery by Dr. Mack in February 2020.  Her postoperative course was uneventful.        The patient has been seeing Dr. Noemy Nguyen for chronic migraines. Patient reports that her chronic intractable headache pain recently got worse. 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  She has undergone blood patches intermittently for her headache pain.       Currently she has been  managing her pain with stable dose of Butrans 10 mcg per hour q 7 days, tramadol 50 mg every 6 hours as needed which she refills every 2-3 months.  In addition she takes tizanidine 4 mg 4 times daily as needed.    She takes sumatriptan for breakthrough migraine.  Occasionally she takes oxycodone  when her pain gets significantly worse.      During the last clinic visit, she reported  right shoulder pain and right hip pain.  Physical therapy was ordered.  X-ray of the hip showed no osseous deformity or degenerative changes.     Diagnosis  Postlaminectomy pain syndrome  Intractable headache syndrome  Cervicalgia  Cervical facet arthritis  Lumbar degenerative disc disease      Assessment and plan    Continue Butrans, tramadol    Meloxicam 7.5 mg daily for 2 weeks    X-ray right shoulder 2 views    Physical therapy referral for right shoulder    Schedule second cervical medial branch nerve block.  Risks of the procedure including bleeding, infection, increased pain, failed procedure, nerve injury, discussed with the patient.  All questions answered.        Faiza Martinez MD, PhD    [facility] [address]  5TH FLOOR  [facility] [id number] [phone]  Dept: [phone]

## 2021-01-21 NOTE — LETTER
1/21/2021       RE: Ada Welch  [address]  [place] [address]     Dear Colleague,    Thank you for referring your patient, Ada Welch, to the [facility] [place] at [facility]. Please see a copy of my visit note below.    Video call duration: 20 minutes    Assessment and plan     Ada Welch is a 50 year old female who  is well known to me for multifocal chronic pain. The patient has very complex history of low back pain for ~25 years for which she underwent L4-S1 fusion (TLIF) 12/9/13.  Postoperatively, her back pain worsened (mainly LBP, but also radiated down both lower limbs in an S1 dermatomal distribution).   Underwent removal of posterior instrumentation (screws and guadalupe).  After fusion removal, her pain continued.  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  She even sought care from a EDS specialist in [place].  She is currently on disability.     She has a h/o significant autonomic dysfunction.  Since 2014, she reports that her heart rate has fluctuated from as low as  beats per minutes and her blood pressure also fluctuates at the same time without any aggravating factors.  The fluctuation of the blood pressure and heart rate happens when she is supine, sitting, standing, walking, etc.  She has seen cardiologist Dr. Russell who advised her to continue fludrocortisone, isometric exercise, low carbohydrate and low gluten diet.     She has been seeing Dr. Singh for urinary urgency and incontinence.  She is advised for improving lifestyle, dietary modification, optimizing bowel regimen and to start pelvic physical therapy.     She has seen Dr. Moncada for left upper extremity radicular pain along the C8 nerve root distribution.  Per Dr. Moncada's note, she does not have a surgical target which may explain her symptoms.  She still continues to report this symptom.  We discussed about  epidural steroid injection, however she is reluctant to perform the procedures and states she previously had a bad reaction with a steroid.   Patient also had cervical medial branch nerve block with us 50% pain relief.  2nd nerve block is not yet scheduled.      The patient has seen Dr. Mack for eosinophilic esophagitis, GERD, and dysphagia.    She had open hernia surgery by Dr. Mack in February 2020.  Her postoperative course was uneventful.        The patient has been seeing Dr. Noemy Nguyen for chronic migraines. Patient reports that her chronic intractable headache pain recently got worse. 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  She has undergone blood patches intermittently for her headache pain.       Currently she has been  managing her pain with stable dose of Butrans 10 mcg per hour q 7 days, tramadol 50 mg every 6 hours as needed which she refills every 2-3 months.  In addition she takes tizanidine 4 mg 4 times daily as needed.    She takes sumatriptan for breakthrough migraine.  Occasionally she takes oxycodone  when her pain gets significantly worse.      During the last clinic visit, she reported  right shoulder pain and right hip pain.  Physical therapy was ordered.  X-ray of the hip showed no osseous deformity or degenerative changes.     Diagnosis  Postlaminectomy pain syndrome  Intractable headache syndrome  Cervicalgia  Cervical facet arthritis  Lumbar degenerative disc disease    Assessment and plan    Continue Butrans, tramadol    Meloxicam 7.5 mg daily for 2 weeks    X-ray right shoulder 2 views    Physical therapy referral for right shoulder    Schedule second cervical medial branch nerve block.  Risks of the procedure including bleeding, infection, increased pain, failed procedure, nerve injury, discussed with the patient.  All questions answered.    Faiza Martinez MD, PhD    [facility] [address]  [address]  5TH FLOOR  [facility] [id number] [phone]  Dept: [phone]             AVS Sent to the pt's MyChart.   Pt was called and they stated that they have a good understanding of the procedure instructions.     Recommended procedure: Right sided Cervical Medial Branch Blocks    Anticoagulant: Pt denied taking any.     Recommended follow up: 3 months or earlier if indicated.     Pt is requesting to schedule Cervical Radiofrequency Ablation after they finish their Blocks.     Mya Cash LPN

## 2021-01-22 ENCOUNTER — TELEPHONE (OUTPATIENT)
Dept: ANESTHESIOLOGY | Facility: CLINIC | Age: 51
End: 2021-01-22

## 2021-01-22 NOTE — TELEPHONE ENCOUNTER
Attempt # 1    I called Ada to schedule a procedure with Dr. Martinez. Left a voicemail with my call back number and request that they leave a good call back time if they get my voicemail.    Jolanta MONSON  Asya-Op Coordinator

## 2021-01-25 PROBLEM — M47.812 ARTHROPATHY OF CERVICAL FACET JOINT: Status: ACTIVE | Noted: 2021-01-25

## 2021-01-25 NOTE — NURSING NOTE
AVS given and reviewed with pt.  Pt verbalized understanding and declined any questions.     Jessica De Souza, RN, BSN     
pre-op instruction and orientation to procedure provided

## 2021-01-28 NOTE — TELEPHONE ENCOUNTER
[facility]    Address:  [address] [place] [address]  Location Hours:  Office: Monday-Friday 8:00am - 5:00pm (Phone hours 9:00am-4:30pm)  Phone:  [phone]  Fax:  [phone]      Referral faxed today.   Kristal Tilley, EMT at 2:32 PM on 1/28/2021.

## 2021-01-28 NOTE — TELEPHONE ENCOUNTER
I can provide a referral if she would like to set up an appt. He's at [facility]--she should check with her insurance.  Ellyn Rivera MD  Internal Medicine

## 2021-01-29 DIAGNOSIS — Z11.59 ENCOUNTER FOR SCREENING FOR OTHER VIRAL DISEASES: Primary | ICD-10-CM

## 2021-02-01 ENCOUNTER — DOCUMENTATION ONLY (OUTPATIENT)
Dept: CARE COORDINATION | Facility: CLINIC | Age: 51
End: 2021-02-01

## 2021-02-05 DIAGNOSIS — M25.511 SHOULDER PAIN, RIGHT: Primary | ICD-10-CM

## 2021-02-08 ENCOUNTER — HOSPITAL ENCOUNTER (OUTPATIENT)
Dept: LAB | Facility: CLINIC | Age: 51
Discharge: HOME | End: 2021-02-08
Attending: PSYCHIATRY & NEUROLOGY | Admitting: ANESTHESIOLOGY
Payer: COMMERCIAL

## 2021-02-08 DIAGNOSIS — Z11.59 ENCOUNTER FOR SCREENING FOR OTHER VIRAL DISEASES: ICD-10-CM

## 2021-02-08 LAB
SARS-COV-2 RNA RESP QL NAA+PROBE: NORMAL
SPECIMEN SOURCE: NORMAL

## 2021-02-08 PROCEDURE — U0005 INFEC AGEN DETEC AMPLI PROBE: HCPCS | Performed by: ANESTHESIOLOGY

## 2021-02-08 PROCEDURE — U0003 INFECTIOUS AGENT DETECTION BY NUCLEIC ACID (DNA OR RNA); SEVERE ACUTE RESPIRATORY SYNDROME CORONAVIRUS 2 (SARS-COV-2) (CORONAVIRUS DISEASE [COVID-19]), AMPLIFIED PROBE TECHNIQUE, MAKING USE OF HIGH THROUGHPUT TECHNOLOGIES AS DESCRIBED BY CMS-2020-01-R: HCPCS | Performed by: ANESTHESIOLOGY

## 2021-02-09 LAB
LABORATORY COMMENT REPORT: NORMAL
SARS-COV-2 RNA RESP QL NAA+PROBE: NEGATIVE
SPECIMEN SOURCE: NORMAL

## 2021-02-11 ENCOUNTER — TELEPHONE (OUTPATIENT)
Dept: ANESTHESIOLOGY | Facility: CLINIC | Age: 51
End: 2021-02-11

## 2021-02-11 NOTE — TELEPHONE ENCOUNTER
[facility] Call Center    Phone Message    May a detailed message be left on voicemail: yes     Reason for Call: Other: Pt calling to say that she has a fever of 100.5 this morning. Please call to discuss further with the patient. Her procedure is scheduled at 1230pm.      Action Taken: Message routed to:  [facility] Pain clinic    Travel Screening: Not Applicable

## 2021-02-11 NOTE — TELEPHONE ENCOUNTER
LPN called and spoke to the pt. Dr. Martinez was updated of the pt's message stating that they had a fever. Dr. Martinez recommended cancelling.   Pt was informed that the procedure  will reach out to the pt to assist them to reschedule.     Mya Cash LPN

## 2021-02-17 ENCOUNTER — THERAPY VISIT (OUTPATIENT)
Dept: PHYSICAL THERAPY | Facility: CLINIC | Age: 51
End: 2021-02-17
Payer: COMMERCIAL

## 2021-02-17 DIAGNOSIS — G89.29 CHRONIC RIGHT SHOULDER PAIN: ICD-10-CM

## 2021-02-17 DIAGNOSIS — M25.512 CHRONIC LEFT SHOULDER PAIN: ICD-10-CM

## 2021-02-17 DIAGNOSIS — M54.2 CERVICALGIA: Primary | ICD-10-CM

## 2021-02-17 DIAGNOSIS — M25.511 CHRONIC RIGHT SHOULDER PAIN: ICD-10-CM

## 2021-02-17 DIAGNOSIS — G89.29 CHRONIC LEFT SHOULDER PAIN: ICD-10-CM

## 2021-02-17 PROCEDURE — 97140 MANUAL THERAPY 1/> REGIONS: CPT | Mod: GP | Performed by: PHYSICAL THERAPIST

## 2021-02-17 PROCEDURE — 97110 THERAPEUTIC EXERCISES: CPT | Mod: GP | Performed by: PHYSICAL THERAPIST

## 2021-02-17 PROCEDURE — 97161 PT EVAL LOW COMPLEX 20 MIN: CPT | Mod: GP | Performed by: PHYSICAL THERAPIST

## 2021-02-17 NOTE — PROGRESS NOTES
[facility] Initial Evaluation -- Cervical    Evaluation Date: February 17, 2021  Ada Welch is a 50 year old female with a neck, upper back, and shoulder condition.   Referral: Pain  Work mechanical stresses: none   Employment status: stays at home/disabled  Leisure mechanical stresses: normal household activities--cleaning, cooking  Functional disability score (NDI):    VAS score (0-10): 6/10  Patient goals/expectations:  To have less pain.    HISTORY:    Present symptoms:  B neck and upper back pain, B anterior and posterior shoulder pain.  Pain quality (sharp/shooting/stabbing/aching/burning/cramping):   Aching, sharp.  Paresthesia (yes/no):  Yes--numbness, tingling into the R>L arm to the thumb and index finger, sometimes pinky    Present since (onset date): ongoing for years--MD orders 01/21/2021     Symptoms (improving/unchanging/worsening):  worsening    Symptoms commenced as a result of: unknown   Condition occurred in the following environment:  unknown    Symptoms at onset (neck/arm/forearm/headache): neck, upper back, B shoulders  Constant symptoms (neck/arm/forearm/headache): none  Intermittent symptoms (neck/arm/forearm/headache): neck, upper back, shoulders, UEs to hands, numbness/tingling    Symptoms are made worse with the following: reaching, lifting, overhead work, vacuuming, wiping a table , making beds, doing laundry--difficulty taking clothes out of washer and dryer  Symptoms are made better with the following: lying down    Disturbed sleep (yes/no): yes  Number of pillows: 1  Sleeping postures (prone/sup/side R/L): sides    Previous episodes (0/1-5/6-10/11+): many Year of first episode: years ago    Previous history: ongoing neck issues for years  Previous treatments: PT    Specific Questions: (as reported by the patient)  Dizziness/Tinnitus/Nausea/Swallowing (pos/neg): neg  Gait/Upper Limbs (normal/abnormal): normal but painful elevation  Medications  "(nil/NSAIDS/anlag/steroids/anticoag/other):  NSAIDS, Muscle relaxants and Other - Thyroid, Sleep and pain  Medical allergies:  none  General health (excellent/good/fair/poor):  fair  Pertinent medical history:  Concussions/Dizziness, Migraines/Headaches, Thyroid problems, Persistent fever/chills, Progressive neurological deficits and cold/hot extremity, non-healing wounds, severe headaches  Imaging (None/Xray/MRI/Other):  None recent--has R shoulder x-ray 03/01/2021  Recent or major surgery (yes/no): no; hx of hiatal hernia, lumbar fusion, tonsillectomy, rhinoplasty  Night pain (yes/no): no  Accidents (yes/no): no  Unexplained weight loss (yes/no): no  Barriers at home: no  Other red flags: no    EXAMINATION    Posture:   Sitting (good/fair/poor): fair  Standing (good/fair/poor): good     Protruded head (yes/no): yes    Wry Neck (right/left/nil):  nil  Relevant (yes/no):  na     Correction of posture(better/worse/no effect): NE  Other observations:  none    Neurological:    Motor Deficit:  B myotomes 5/5 except opposition and thumb extension 4/5 B   Reflexes:  Not assessed  Sensory Deficit:  normal   Dural signs:  Not assessed    Movement Loss:   Neri Mod Min Nil Pain   Protrusion    x NE   Flexion    x \"tight\" posterior cervical   Retraction    x Decreases neck pain   Extension   x  \"lightheaded\"   Lateral flexion R    x \"tight\" L neck   Lateral flexion L    x \"tight\" R neck   Rotation R    x NE   Rotation L    x NE     Test Movements:   During: produces, abolishes, increases, decreases, no effect, centralizing, peripheralizing  After: better, worse, no better, no worse, no effect, centralized, peripheralized    Pretest symptoms sitting: no neck or shoulder pain at rest   Symptoms During Symptoms After ROM increased ROM decreased No Effect   PRO        Rep PRO        RET        Rep RET Decreases    No better         RET EXT Decreases    Better      x   Rep RET EXT          Pretest symptoms lying:  No neck or B " shoulder pain at rest   Symptoms During Symptoms After ROM increased ROM decreased No Effect   RET With self-OP, head on pillow--decreases No Better         Rep RET With self-OP, head on pillow--decreases Better    x     RET EXT        Rep RET EXT          If required, pretest symptoms sitting:      Symptoms During Symptoms After ROM increased ROM decreased No Effect   LF-R        Rep LF-R        LF-L        Rep LF-L        ROT-R        Rep ROT-R        ROT-L        Rep ROT-L        FLEX        Rep FLEX            Static Tests:   Protrusion:    Flexion:    Retraction:    Extension (sitting/prone/supine):      Other Tests:     Provisional Classification:  Derangement - Bilateral, symmetrical, symptoms above elbow    Principle of Management:  Education:  Posture--avoid fwd head, use of lumbar roll in sitting; POC, treatment rationale, expected response    Equipment provided:  none  Mechanical therapy (Y/N):  y   Extension principle:  Supine cervical retraction with self-OP x10 reps, 4x/day at least; may continue seated as well   Lateral principle:    Flexion principle:     Other:      ASSESSMENT/PLAN:    Patient is a 50 year old female with cervical and both sides shoulder complaints.  Shoulder symptoms appear related to cervical/thoracic issues, and treatment will focus on this area to start with and move to shoulder if not responding.  B shoulder AROM is normal, but all motions are painful.  Myotomes are normal except for thumb extension and opposition being weak B.  Will monitor this throughout treatment.     Patient has the following significant findings with corresponding treatment plan.                Diagnosis 1:  Neck, B shoulder pain  Pain -  self management, education, directional preference exercise and home program  Decreased ROM/flexibility - manual therapy, therapeutic exercise and home program  Decreased function - therapeutic activities and home program  Impaired posture - neuro re-education and home  program    Cumulative Therapy Evaluation is: Low complexity.    Previous and current functional limitations:  (See Goal Flow Sheet for this information)    Short term and Long term goals: (See Goal Flow Sheet for this information)     Communication ability:  Patient appears to be able to clearly communicate and understand verbal and written communication and follow directions correctly.  Treatment Explanation - The following has been discussed with the patient:   RX ordered/plan of care  Anticipated outcomes  Possible risks and side effects  This patient would benefit from PT intervention to resume normal activities.   Rehab potential is good.    Frequency:  1 X week, once daily  Duration:  for 6 weeks  Discharge Plan:  Achieve all LTG.  Independent in home treatment program.  Reach maximal therapeutic benefit.    Please refer to the daily flowsheet for treatment today, total treatment time and time spent performing 1:1 timed codes.    Answers for HPI/ROS submitted by the patient on 2/17/2021   History Reported by Patient  Reason for Visit:: My neck and right shoulder  When problem began:: 1/17/2018  How problem occurred:: Gradual degeneration  Number scale: 6/10  General health as reported by patient: fair

## 2021-02-18 DIAGNOSIS — Z11.59 ENCOUNTER FOR SCREENING FOR OTHER VIRAL DISEASES: ICD-10-CM

## 2021-02-19 DIAGNOSIS — D47.09 MAST CELL DISORDER: ICD-10-CM

## 2021-02-19 DIAGNOSIS — K44.9 HIATAL HERNIA: ICD-10-CM

## 2021-02-19 DIAGNOSIS — J31.0 CHRONIC RHINITIS: ICD-10-CM

## 2021-02-22 ENCOUNTER — TRANSFERRED RECORDS (OUTPATIENT)
Dept: HEALTH INFORMATION MANAGEMENT | Facility: CLINIC | Age: 51
End: 2021-02-22

## 2021-02-22 RX ORDER — FLUTICASONE PROPIONATE 50 MCG
2 SPRAY, SUSPENSION (ML) NASAL DAILY
Qty: 48 ML | Refills: 0 | Status: SHIPPED | OUTPATIENT
Start: 2021-02-22 | End: 2021-05-18

## 2021-02-22 RX ORDER — FAMOTIDINE 20 MG/1
20 TABLET, FILM COATED ORAL AT BEDTIME
Qty: 90 TABLET | Refills: 3 | Status: SHIPPED | OUTPATIENT
Start: 2021-02-22 | End: 2022-03-11

## 2021-02-22 NOTE — TELEPHONE ENCOUNTER
FAMOTIDINE 20 MG TABLET  Last Written Prescription Date:  9/8/2020  Last Fill Quantity: 90,   # refills: 2  Last Office Visit : 11/13/2020  Future Office visit:  3/1/2021  90 Tabs, 3 Refills sent to pharm 2/22/2021      Brittni Dickson RN  Central Triage Red Flags/Med Refills

## 2021-02-22 NOTE — TELEPHONE ENCOUNTER
FLUTICASONE PROP 50 MCG SPRAY   Last Written Prescription Date:  1/28/2020  Last Fill Quantity: 48,   # refills: 3  Last Office Visit : 1/28/2020  Future Office visit:  None    Routing refill request to provider for review/approval because:  Over due office visit       30 day marcia sent to pharm  Coordinator notified       Brittni Dickson RN  Central Triage Red Flags/Med Refills

## 2021-02-28 ENCOUNTER — HEALTH MAINTENANCE LETTER (OUTPATIENT)
Age: 51
End: 2021-02-28

## 2021-03-01 ENCOUNTER — ANCILLARY PROCEDURE (OUTPATIENT)
Dept: GENERAL RADIOLOGY | Facility: CLINIC | Age: 51
End: 2021-03-01
Attending: INTERNAL MEDICINE
Payer: COMMERCIAL

## 2021-03-01 ENCOUNTER — DOCUMENTATION ONLY (OUTPATIENT)
Dept: NEUROLOGY | Facility: CLINIC | Age: 51
End: 2021-03-01

## 2021-03-01 ENCOUNTER — OFFICE VISIT (OUTPATIENT)
Dept: INTERNAL MEDICINE | Facility: CLINIC | Age: 51
End: 2021-03-01
Payer: COMMERCIAL

## 2021-03-01 ENCOUNTER — ANCILLARY PROCEDURE (OUTPATIENT)
Dept: GENERAL RADIOLOGY | Facility: CLINIC | Age: 51
End: 2021-03-01
Attending: ANESTHESIOLOGY
Payer: COMMERCIAL

## 2021-03-01 ENCOUNTER — HOSPITAL ENCOUNTER (OUTPATIENT)
Dept: LAB | Facility: CLINIC | Age: 51
Discharge: HOME | End: 2021-03-01
Attending: INTERNAL MEDICINE | Admitting: ANESTHESIOLOGY
Payer: COMMERCIAL

## 2021-03-01 VITALS
HEART RATE: 72 BPM | OXYGEN SATURATION: 98 % | BODY MASS INDEX: 26.94 KG/M2 | WEIGHT: 172 LBS | SYSTOLIC BLOOD PRESSURE: 129 MMHG | DIASTOLIC BLOOD PRESSURE: 89 MMHG

## 2021-03-01 DIAGNOSIS — S43.429S SPRAIN OF ROTATOR CUFF CAPSULE, UNSPECIFIED LATERALITY, SEQUELA: ICD-10-CM

## 2021-03-01 DIAGNOSIS — Z11.59 ENCOUNTER FOR SCREENING FOR OTHER VIRAL DISEASES: ICD-10-CM

## 2021-03-01 DIAGNOSIS — M25.512 CHRONIC PAIN OF BOTH SHOULDERS: Primary | ICD-10-CM

## 2021-03-01 DIAGNOSIS — M25.511 CHRONIC PAIN OF BOTH SHOULDERS: ICD-10-CM

## 2021-03-01 DIAGNOSIS — G89.29 CHRONIC PAIN OF BOTH SHOULDERS: ICD-10-CM

## 2021-03-01 DIAGNOSIS — G62.9 PERIPHERAL POLYNEUROPATHY: ICD-10-CM

## 2021-03-01 DIAGNOSIS — G89.29 CHRONIC PAIN OF BOTH SHOULDERS: Primary | ICD-10-CM

## 2021-03-01 DIAGNOSIS — M25.511 CHRONIC PAIN OF BOTH SHOULDERS: Primary | ICD-10-CM

## 2021-03-01 DIAGNOSIS — Z12.31 ENCOUNTER FOR SCREENING MAMMOGRAM FOR BREAST CANCER: ICD-10-CM

## 2021-03-01 DIAGNOSIS — M25.512 CHRONIC PAIN OF BOTH SHOULDERS: ICD-10-CM

## 2021-03-01 DIAGNOSIS — M25.511 SHOULDER PAIN, RIGHT: ICD-10-CM

## 2021-03-01 LAB
LABORATORY COMMENT REPORT: NORMAL
SARS-COV-2 RNA RESP QL NAA+PROBE: NEGATIVE
SARS-COV-2 RNA RESP QL NAA+PROBE: NORMAL
SPECIMEN SOURCE: NORMAL
SPECIMEN SOURCE: NORMAL

## 2021-03-01 PROCEDURE — 99214 OFFICE O/P EST MOD 30 MIN: CPT | Performed by: INTERNAL MEDICINE

## 2021-03-01 PROCEDURE — 73030 X-RAY EXAM OF SHOULDER: CPT | Mod: LT | Performed by: RADIOLOGY

## 2021-03-01 PROCEDURE — 73030 X-RAY EXAM OF SHOULDER: CPT | Mod: RT | Performed by: RADIOLOGY

## 2021-03-01 PROCEDURE — U0003 INFECTIOUS AGENT DETECTION BY NUCLEIC ACID (DNA OR RNA); SEVERE ACUTE RESPIRATORY SYNDROME CORONAVIRUS 2 (SARS-COV-2) (CORONAVIRUS DISEASE [COVID-19]), AMPLIFIED PROBE TECHNIQUE, MAKING USE OF HIGH THROUGHPUT TECHNOLOGIES AS DESCRIBED BY CMS-2020-01-R: HCPCS | Performed by: ANESTHESIOLOGY

## 2021-03-01 PROCEDURE — U0005 INFEC AGEN DETEC AMPLI PROBE: HCPCS | Performed by: ANESTHESIOLOGY

## 2021-03-01 ASSESSMENT — PAIN SCALES - GENERAL: PAINLEVEL: SEVERE PAIN (6)

## 2021-03-01 NOTE — PROGRESS NOTES
History of Present Illness:  Ms. Welch is a 50 year old female who presents for  Chief Complaint   Patient presents with     RECHECK     shoulders are hurting (pain wakes her up), trouble lifting hands up due to pain. Swollen lymphnode (one sided headache).       Dealing more with shoulder, arm/wrist pain, bilaterally, was athletic as a child but no recent injuries, worse in last 2-3 years, no shoulder surgeries  Has xray scheduled of R shoulder, wondering about L  Has increased tramadol usage, also still using cannabis  PEG=18, uses daily, helps with worry about pain  Seeing Dr. Martinez and having MBB injections, C3-5 this week, some weakness in bilat hands  She did meet with Dr. Baltazar re: Chiari malformation, see below for documentation  Headaches are still present, states migraine on L consistently since December  Still has some oropharyngeal dysphagia but swallowing/GERD/pressure overall improved  No dental/ear infections      Saw Dr. Baltazar in Feb 2021    We have been asked today to assess the reported diagnosis of downward migration of the tonsils consistent with a Chiari-1 malformation. It is important to note here the patient has also been under the care of our esteemed colleague, Dr. Nguyen, for migraines.    A careful evaluation of her MRI reveals, in our opinion, no crowding at the foramen magnum, and here I would like to bring the reader's attention particularly to the coronal view, series 14, slice 16 of 28, in addition to sagittal view, series 2, slice 13 of 28, and 12 of 28, and 16 of 28.     It is our opinion we do not have crowding of the foramen magnum, we do not have a compression exerted on the anterior cervical medullary junction either. We are appreciative of the slight dilation of the central canal which, in our opinion, is consistent with a normal variant.     It is also our opinion completing the MRI in a sitting position would not really alter the diagnosis.     In regards to the previous  impression of cerebrospinal fluid hypotension, it is our opinion the features on the MRI are not consistent. We are not having a significant enhancement as far as the leptomeninges are concerned, nor an enlargement of the subarachnoid space nor a shortening of the mamilopontine interval or the pre-pontine interval. In addition to the above, it is our opinion we do not have a significant migration of the tonsils.    Following an extensive discussion and review prior to this visit, we would not advise for a neurosurgical intervention.    Walker Baltazar, [facility]       Dr. Isbell 2019:  Assessment:  1. Postural orthostatic tachycardia syndrome   2. EDS3  3. Chronic Neck Pain  4. Chronic migraines - being managed by blood patch per Dr. Murray  5.GI Dysmotility     Plan:  Patient reports progression and worsening of symptoms for the past 3-4 months including dizziness, nausea, fatigue, GI bloating and diaphoresis.     Again discussed with patient that her symptoms are related to POTS which is a nonspecific condition and can be associated with hypovolemia, anxiety and peripheral neuropathy. She was also recently seen by her Cardiologist who recommended to wear a Holter monitor. We are in agreement with this plan and we will be interested in seeing the results.     - We plan to repeat autonomic testing given the progression and change in characteristics of her symptoms.   - We discussed about trying Fludrocortisone or Midodrine which may help with hypovolemia and improve symptoms.   - Continue conservative measures for managing orthostatic intolerance including salt and water intake to maintain adequate fluid volume.   - Again recommended using a HR monitor for upright, supine or recumbent exercise, with a daily goal of 30 minutes exercise to induce -120 and generalized sweating.      Detailed Medical History:  -EDS-Type 3 Hypermobility. 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 He reports having a Beighton  score of 7-8 out of 9. She reports having genetic testing done, which was questionable for the MYLK Gene. She previously followed with a rheumatologist who is an EDS specialist in [place].  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 Russell, has ILR. Ivadrabine and propronolol helping.  -Cervical instability, chiari malformation:  Saw Dr. oMncada, cervical fusion not recommended, considering occipital blocks. Also traveled to [facility] to see Dr. Andrey Sofia.  -Chronic headaches: Daily tension, cervicogenic, migraine. Has not responded well to injections. Hospitalized in 2017 with normal LP and MRI. At that time responded to DHA.  Had blood patch done 3/19 with good relief of pain. Previously failed amovig and emgality. Blood patch test repeated 9/19. Now managing with medical management, Uses fioricet,maxalt, aleve, tizanidine.  -Hiatal hernia: Recently saw Dr. Makc and had manometry testing done in Feb 2019 which revealed lower esoph pressure, hiatal hernia, possibly short esophagus, peristalsis was preserved. S/p Nissen surgery 2/20.  -Eosinophilic esophagitis: EGD 12/18 performed for food impaction showed ring at GEJ and mucosal changes c/w EoE. Biopsies 8/18 positive for Eos.  -Lumbar pain: She underwent L4 to S1 fusion in 2013 followed by removal of instrumentation, which did not improve her symptoms. She met with Dr. Martinez in the pain clinic and was taken off high doses of Dilaudid and started on Suboxone in 2017, which dramatically improved her symptoms.  She continues to work with physical therapy twice a week as well as other complementary therapies.   -Chronic Pain: On suboxone per above, as well as occasional oxycodone and ultram. Uses medical cannabis since 2018 which helps.  -IBS, constipation: GES showed rapid emptying 10/17. XR video swallow showed no aspiration 10/17.  -Asthma  -Enlarged cervical lymph nodes: L level 2 node biopsied 10/18 with scant cellularity. Biopsied in  past and benign with neg flow cytometry.  -Urinary urge incontinence/incomplete emptying  -Pelvic floor dysfunction, rectocele  -Chronic Pain: Has regimen of medical cannabis, tramadol, butran, TCA, cymbalta and tizanidine. Follows with Dr. Martinez.     Routine Health Maintenence:  Depression Screening:   PHQ-2 ( 1999 [employer]) 2/14/2019 11/8/2018   Q1: Little interest or pleasure in doing things 0 0   Q2: Feeling down, depressed or hopeless 0 0   PHQ-2 Score 0 0   Q1: Little interest or pleasure in doing things Not at all Not at all   Q2: Feeling down, depressed or hopeless Not at all Not at all   PHQ-2 Score 0 0      Immunizations (zoster, pneumovax, flu, Tdap, Hep A/B):   There is no immunization history on file for this patient.  Lipids:         Recent Labs   Lab Test 04/26/18  1010   CHOL 244*   HDL 48*   *   TRIG 156*      Lung Ca Screening (>30 pk age 55-79 or >20 py age 50-79 + RF): n/a low pack years  Colonoscopy (50-75 yrs): 3/17   - One small hyperplastic polyp in the sigmoid colon, removed with a cold snare. Resected and retrieved. Diverticulosis in the sigmoid colon. Repeat colonoscopy in10 years for surveillance   Dexa (>65W or 70M yrs): n/a  Mammogram (40-75 yrs): 1/18 ordered, due  Pap (21-65 yrs): 1/18 HPV neg  HIV/HCV if risk factors:  HIV neg 4/18  Tob/EtOH: screen neg  Lifestyle factors: reviewed  Advanced Directive: deferred    Review of external notes as documented above --[facility]                  A detailed Review of Systems was performed, verified and is negative except as documented in the HPI.  All health questionnaires were reviewed, verified and relevant information documented above.      Past Medical History:  Past Medical History:   Diagnosis Date     Allergic rhinitis 09/2007     Anxiety      Arthritis 11/01/2013    Found during search for cause of back pain     Autoimmune disease (H) 2016    Immunosuppresive     Autonomic dysfunction      Bleeding disorder (H) 2016     Bruise easily     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     Cervicalgia      Chronic sinusitis 00/2007    Diagnosed with chronic pansinusitis 2016     CSF leak     Chiari malformation     Depression      Tom-Danlos disease      Eosinophilic esophagitis 08/2018     Gastroesophageal reflux disease 3/1/2017    I have large hiatal hernia     Hiatal hernia 2016    Have adeline hiatel hernia     Hoarseness Unsure    It comes and goes     Hypothyroid      IBS (irritable bowel syndrome) with constipation     improved on Linzess     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     Migraines 1/1/2007    Unsure of exact date     Nasal polyps 2013    Were revoved 03/2017, benign     Orthostatic hypotension      Other nervous system complications 1/2014    Autonomic nervous system disorder, neuralgia, neuropathy     Swelling, mass, or lump in head and neck 08/2016    Lymph node has been growing for last year     Thyroid disease 01/01/2008     Urinary incontinence      Urinary urgency        Active Meds:  Current Outpatient Medications   Medication     albuterol (PROAIR HFA/PROVENTIL HFA/VENTOLIN HFA) 108 (90 BASE) MCG/ACT Inhaler     amitriptyline (ELAVIL) 10 MG tablet     buprenorphine (BUTRANS) 10 MCG/HR WK patch     diclofenac (VOLTAREN) 1 % topical gel     DULoxetine (CYMBALTA) 30 MG capsule     EPINEPHrine (EPIPEN/ADRENACLICK/OR ANY BX GENERIC EQUIV) 0.3 MG/0.3ML injection 2-pack     famotidine (PEPCID) 20 MG tablet     ferrous fumarate 65 mg, [place]. FE,-Vitamin C 125 mg (VITRON-C)  MG TABS tablet     fluticasone (FLONASE) 50 MCG/ACT nasal spray     ivabradine (CORLANOR) 7.5 MG tablet     levothyroxine (SYNTHROID/LEVOTHROID) 50 MCG tablet     Lidocaine (LIDOCARE) 4 % Patch     medical cannabis (Patient's own supply.  Not a prescription)     metoclopramide (REGLAN) 10 MG tablet     Multiple Vitamins-Minerals (MULTIVITAMIN PO)     naproxen sodium (ANAPROX) 220 MG tablet      omeprazole (PRILOSEC) 40 MG DR capsule     Probiotic Product (PROBIOTIC DAILY PO)     propranolol ER (INDERAL LA) 120 MG 24 hr capsule     rizatriptan (MAXALT-MLT) 5 MG ODT     SUMAtriptan (IMITREX STATDOSE) 6 MG/0.5ML pen injector kit     tiZANidine (ZANAFLEX) 4 MG tablet     traMADol (ULTRAM) 50 MG tablet     VITAMIN D, CHOLECALCIFEROL, PO     cetirizine (ZYRTEC) 10 MG tablet     linaclotide (LINZESS) 145 MCG capsule     meloxicam (MOBIC) 7.5 MG tablet     naloxone (NARCAN) 4 MG/0.1ML nasal spray     ondansetron (ZOFRAN-ODT) 4 MG ODT tab     vitamin B complex with vitamin C (VITAMIN  B COMPLEX) TABS tablet     No current facility-administered medications for this visit.         Allergies:  Reviewed, refer to EMR    Relevant Social History:  Social History     Tobacco Use     Smoking status: Former Smoker     Packs/day: 0.20     Years: 10.00     Pack years: 2.00     Types: Cigarettes     Start date: 1991     Quit date: 2013     Years since quittin.2     Smokeless tobacco: Never Used   Substance Use Topics     Alcohol use: Yes     Comment: occasional - 4 times a year      Drug use: Not Currently     Types: Marijuana     Comment: medical marijuana        Physical Exam:  Vitals: /89 (BP Location: Right arm, Patient Position: Sitting, Cuff Size: Adult Regular)   Pulse 72   Wt 78 kg (172 lb)   LMP 2021   SpO2 98%   BMI 26.94 kg/m    Constitutional: Alert, oriented, pleasant, no acute distress  Head: Normocephalic, atraumatic  Eyes: Extra-ocular movements intact, pupils equally round bilaterally, no scleral icterus  ENT: Oropharynx clear, moist mucus membranes, good dentition, TM clear bilat  Neck: Supple, no lymphadenopathy appreciated  Cardiovascular: Regular rate and rhythm, no murmurs, rubs or gallops, peripheral pulses full/symmetric  Respiratory: Good air movement bilaterally, lungs clear, no wheezes/rales/rhonchi  Musculoskeletal: No edema, normal muscle tone, normal gait full ROM  shoulders, no weakness, pain with supination and internal rotation on L, no pain with cross arm adduction, no weakness with abduction  Neurologic: Alert and oriented, cranial nerves 2-12 intact, grossly non-focal  Skin: No rashes/lesions  Psychiatric: normal mentation, affect and mood      Diagnostics:  Labs reviewed in Epic          Assessment and Plan:  Ada was seen today for recheck.    Diagnoses and all orders for this visit:    Chronic pain of both shoulders  Suspect RC tendinopathy.  Advised PT, consideration of injections if PT not effective.  -     XR Shoulder Left 2 Views; Future    Sprain of rotator cuff capsule, unspecified laterality, sequela  -     XR Shoulder Left 2 Views; Future    Peripheral polyneuropathy  Was previously seeing Dr. Galeano and he recommended she continues cares.  -     NEUROLOGY ADULT REFERRAL    Encounter for screening mammogram for breast cancer  -     Mammogram, routine screening; Future    Chronic Pain  Medical cannabis recert due        Ellyn Rivera MD  Internal Medicine      >30 minutes spent today performing chart review, history and exam, documentation and further activities as noted above.

## 2021-03-01 NOTE — PATIENT INSTRUCTIONS
[facility] Medication Refill Request Information:  * Please contact your pharmacy regarding ANY request for medication refills.  ** [facility] Prescription Fax = [phone]  * Please allow 3 business days for routine medication refills.  * Please allow 5 business days for controlled substance medication refills.     [facility] Test Result notification information:  *You will be notified with in 7-10 days of your appointment day regarding the results of your test.  If you are on MyChart you will be notified as soon as the provider has reviewed the results and signed off on them.    [facility]: [phone]     To schedule mammogram please call: [phone]  Neurology [phone] (3rd Floor [facility])

## 2021-03-01 NOTE — NURSING NOTE
Chief Complaint   Patient presents with     RECHECK     shoulders are hurting (pain wakes her up), trouble lifting hands up due to pain. Swollen lymphnode (one sided headache).       Summer Gomez MA, at 9:32 AM on 3/1/2021.

## 2021-03-01 NOTE — PROGRESS NOTES
Received progress notes from [facility]    Records Date of service: 2/22/21  Copy has been sent to scanning and encounter routed to specialty nurse for review.

## 2021-03-03 ENCOUNTER — THERAPY VISIT (OUTPATIENT)
Dept: PHYSICAL THERAPY | Facility: CLINIC | Age: 51
End: 2021-03-03
Payer: COMMERCIAL

## 2021-03-03 DIAGNOSIS — G89.29 CHRONIC LEFT SHOULDER PAIN: ICD-10-CM

## 2021-03-03 DIAGNOSIS — M25.511 CHRONIC RIGHT SHOULDER PAIN: ICD-10-CM

## 2021-03-03 DIAGNOSIS — G89.29 CHRONIC RIGHT SHOULDER PAIN: ICD-10-CM

## 2021-03-03 DIAGNOSIS — M25.512 CHRONIC LEFT SHOULDER PAIN: ICD-10-CM

## 2021-03-03 DIAGNOSIS — M54.2 CERVICALGIA: ICD-10-CM

## 2021-03-03 PROCEDURE — 97140 MANUAL THERAPY 1/> REGIONS: CPT | Mod: GP | Performed by: PHYSICAL THERAPIST

## 2021-03-03 PROCEDURE — 97530 THERAPEUTIC ACTIVITIES: CPT | Mod: GP | Performed by: PHYSICAL THERAPIST

## 2021-03-03 PROCEDURE — 97110 THERAPEUTIC EXERCISES: CPT | Mod: GP | Performed by: PHYSICAL THERAPIST

## 2021-03-04 ENCOUNTER — HOSPITAL ENCOUNTER (OUTPATIENT)
Facility: AMBULATORY SURGERY CENTER | Age: 51
Discharge: HOME | End: 2021-03-04
Attending: ANESTHESIOLOGY | Admitting: ANESTHESIOLOGY
Payer: COMMERCIAL

## 2021-03-04 ENCOUNTER — ANCILLARY PROCEDURE (OUTPATIENT)
Dept: RADIOLOGY | Facility: AMBULATORY SURGERY CENTER | Age: 51
End: 2021-03-04
Attending: ANESTHESIOLOGY
Payer: COMMERCIAL

## 2021-03-04 VITALS
HEIGHT: 67 IN | BODY MASS INDEX: 27 KG/M2 | SYSTOLIC BLOOD PRESSURE: 144 MMHG | TEMPERATURE: 97.1 F | HEART RATE: 46 BPM | OXYGEN SATURATION: 99 % | RESPIRATION RATE: 16 BRPM | DIASTOLIC BLOOD PRESSURE: 88 MMHG | WEIGHT: 172 LBS

## 2021-03-04 DIAGNOSIS — M47.812 ARTHROPATHY OF CERVICAL FACET JOINT: ICD-10-CM

## 2021-03-04 DIAGNOSIS — R52 PAIN: ICD-10-CM

## 2021-03-04 LAB
HCG UR QL: NEGATIVE
INTERNAL QC OK POCT: YES

## 2021-03-04 PROCEDURE — 81025 URINE PREGNANCY TEST: CPT | Performed by: PATHOLOGY

## 2021-03-04 PROCEDURE — 64490 INJ PARAVERT F JNT C/T 1 LEV: CPT | Mod: RT

## 2021-03-04 RX ORDER — BUPIVACAINE HYDROCHLORIDE 2.5 MG/ML
INJECTION, SOLUTION EPIDURAL; INFILTRATION; INTRACAUDAL PRN
Status: DISCONTINUED | OUTPATIENT
Start: 2021-03-04 | End: 2021-03-04 | Stop reason: HOSPADM

## 2021-03-04 RX ORDER — LIDOCAINE HYDROCHLORIDE 10 MG/ML
INJECTION, SOLUTION EPIDURAL; INFILTRATION; INTRACAUDAL; PERINEURAL PRN
Status: DISCONTINUED | OUTPATIENT
Start: 2021-03-04 | End: 2021-03-04 | Stop reason: HOSPADM

## 2021-03-04 ASSESSMENT — MIFFLIN-ST. JEOR: SCORE: 1437.82

## 2021-03-04 NOTE — DISCHARGE INSTRUCTIONS
"Home Care Instructions after a Medial Branch Block      In a medial branch block, a local anesthetic (numbing medicine) is injected near the medial branch nerve. This stops the transmission of pain signals from the facet joint. If this reduces your pain and improves your mobility, it may tell the doctor which facet joint is causing the pain. This procedure is a diagnostic procedure and is typically short lasting. With this injection, a steroid to increase the longevity of the blocks effect may or may not be used.    Activity  -You may resume most normal activity levels with the exception of strenuous activity. It is important for us to know if your pain with normal activity is relieved after this injection.  -DO NOT shower for 24 hours  -DO NOT remove bandaid for 24 hours    Pain  -You may experience soreness at the injection site for one or two days  -You may use an ice pack for 20 minutes every 2 hours for the first 24 hours  -You may use a heating pad after the first 24 hours  -You may use Tylenol (acetaminophen) every 4 hours or other pain medicines as     directed by your physician    You may experience numbness radiating into your legs or arms (depending on the procedure location). This numbness may last several hours. Until sensation returns to normal; please use caution in walking, climbing stairs, and stepping out of your vehicle, etc.       DID YOU RECEIVE STEROIDS TODAY?  {YES / NO:[id number]::\"Yes\"}    Common side effects of steroids:  Not everyone will experience corticosteroid side effects. If side effects are experienced, they will gradually subside in the 7-10 day period following an injection. Most common side effects include:  -Flushed face and/or chest  -Feeling of warmth, particularly in the face but could be an overall feeling of warmth  -Increased blood sugar in diabetic patients  -Menstrual irregularities my occur. If taking hormone-based birth control an alternate method of birth control is " recommended  -Sleep disturbances and/or mood swings are possible  -Leg cramps      PLEASE KEEP TRACK OF YOUR SYMPTOMS AND NOTE YOUR IMPROVEMENT FOR YOUR DOCTOR.       Fill out the pain diary and fax ([phone]) it back to us. This cues us to call the patient to follow up with scheduling the next procedure. They do not need to call us they have faxed the Pain Diary.     If they do not have access to a fax machine, they can attach it to [employer] and we will follow up with them. They do not need to call us.     If they cannot fax or [employer], then call our call center and request to speak with a nurse for follow up after a procedure [phone].    Please contact us if you have:  -Severe pain  -Fever more than 101.5 degrees Fahrenheit  -Signs of infection at the injection site (redness, swelling, or drainage)    If you have questions, please contact our office at [phone] between the hours of 7:00 am and 3:00 pm Monday through Friday. After office hours you can contact the on call provider by dialing [phone]. If you need immediate attention, we recommend that you go to a hospital emergency room or dial [phone].

## 2021-03-04 NOTE — H&P
"Abbreviated History and Physical    Patient Name: Ada Welch  YOB: 1970    Reason for Procedure: cervical facet arthropathy     History:    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     Cervicalgia      Chronic sinusitis 00/2007    Diagnosed with chronic pansinusitis 2016     Coagulation disorder (H)     factor 8     CSF leak     Chiari malformation     Depression      Tom-Danlos disease      Eosinophilic esophagitis 08/2018     Gastroesophageal reflux disease 3/1/2017    I have large hiatal hernia     Hiatal hernia 2016    Have adeline hiatel hernia     Hoarseness Unsure    It comes and goes     Hypertension      Hypothyroid      IBS (irritable bowel syndrome) with constipation     improved on Linzess     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     Irregular heart beat      Migraines 1/1/2007    Unsure of exact date     Nasal polyps 2013    Were revoved 03/2017, benign     Orthostatic hypotension      Other nervous system complications 1/2014    Autonomic nervous system disorder, neuralgia, neuropathy     Swelling, mass, or lump in head and neck 08/2016    Lymph node has been growing for last year     Thyroid disease 01/01/2008     Urinary incontinence      Urinary urgency        History of obstructive sleep apnea? no    History of problems with sedation? no    Physical exam:  BP (!) 144/88   Pulse (!) 46   Temp 97.1  F (36.2  C) (Temporal)   Resp 16   Ht 1.702 m (5' 7\")   Wt 78 kg (172 lb)   LMP 02/02/2021   SpO2 99%   BMI 26.94 kg/m    General: in no apparent distress   Neuro: At least antigravity strength noted in all 4 extremities  Respiratory: Clear to " ausculation bilaterally   Cardiac: Regular rate and rhythm  Skin: No rashes or lesions on exposed areas of skin    Medications:   Current Outpatient Medications   Medication     amitriptyline (ELAVIL) 10 MG tablet     buprenorphine (BUTRANS) 10 MCG/HR WK patch     DULoxetine (CYMBALTA) 30 MG capsule     famotidine (PEPCID) 20 MG tablet     ferrous fumarate 65 mg, [place]. FE,-Vitamin C 125 mg (VITRON-C)  MG TABS tablet     fluticasone (FLONASE) 50 MCG/ACT nasal spray     ivabradine (CORLANOR) 7.5 MG tablet     levothyroxine (SYNTHROID/LEVOTHROID) 50 MCG tablet     linaclotide (LINZESS) 145 MCG capsule     medical cannabis (Patient's own supply.  Not a prescription)     metoclopramide (REGLAN) 10 MG tablet     Multiple Vitamins-Minerals (MULTIVITAMIN PO)     naproxen sodium (ANAPROX) 220 MG tablet     omeprazole (PRILOSEC) 40 MG DR capsule     Probiotic Product (PROBIOTIC DAILY PO)     propranolol ER (INDERAL LA) 120 MG 24 hr capsule     rizatriptan (MAXALT-MLT) 5 MG ODT     SUMAtriptan (IMITREX STATDOSE) 6 MG/0.5ML pen injector kit     tiZANidine (ZANAFLEX) 4 MG tablet     traMADol (ULTRAM) 50 MG tablet     VITAMIN D, CHOLECALCIFEROL, PO     albuterol (PROAIR HFA/PROVENTIL HFA/VENTOLIN HFA) 108 (90 BASE) MCG/ACT Inhaler     cetirizine (ZYRTEC) 10 MG tablet     diclofenac (VOLTAREN) 1 % topical gel     EPINEPHrine (EPIPEN/ADRENACLICK/OR ANY BX GENERIC EQUIV) 0.3 MG/0.3ML injection 2-pack     Lidocaine (LIDOCARE) 4 % Patch     meloxicam (MOBIC) 7.5 MG tablet     naloxone (NARCAN) 4 MG/0.1ML nasal spray     ondansetron (ZOFRAN-ODT) 4 MG ODT tab     vitamin B complex with vitamin C (VITAMIN  B COMPLEX) TABS tablet     No current facility-administered medications for this encounter.        Allergies:     Allergies   Allergen Reactions     Bee Venom Shortness Of Breath, Palpitations, Anaphylaxis and Difficulty breathing     Patient does carry Epi-Pen     Chicken-Derived Products (Egg) Nausea and Diarrhea     Compazine  [Prochlorperazine]      Extreme jittery     Food      Milk     Gabapentin      Dizzy     Gluten Meal GI Disturbance     Wheat bran and wheat     Pregabalin      Seasonal Allergies      Dust, mold     Topiramate      Pt does not remember reaction       ASA Classification: 2    OK for local anesthetic use.     Faiza Martinez MD  Pain Medicine  [facility] Department of Anesthesia  3/4/2021

## 2021-03-04 NOTE — OP NOTE
Patient: Ada Welch Age: 50 year old   MRN: [id number] Attending: Dr. Martinez     Date of Visit: March 4, 2021      PAIN MEDICINE CLINIC PROCEDURE NOTE    ATTENDING CLINICIAN:    Faiza Martinez MD    ASSISTANT CLINICIAN:  Denisa Villalba MD    PREPROCEDURE DIAGNOSES:  1.  Cervical facet arthropathy   2.  Cervicalgia  3.  Neck pain       PROCEDURE(S) PERFORMED:  1.  Right Cervical C3, C4 and C5 medial branch block(s)   2.  Fluoroscopic guidance for the above procedures    ANESTHESIA:  Local.    BLOOD LOSS:  Minimal.    DRAINS AND SPECIMENS:  None.    COMPLICATIONS:  None.    INDICATIONS:  Ada Welch is a 50 year old female with a history of cervical facet arthropathy.  The patient stated that the patient was in their usual state of health and denied recent anticoagulant use or recent infections.  Therefore, the plan is to perform above mentioned procedures.     Procedure Details:  The patient was met in the procedure room, where the patient was identified by name, medical record number and date of birth.  All of the patient s last minute questions were answered. Written informed consent was obtained and saved in the electronic medical record, after the risks, benefits, and alternatives were discussed with the patient.      A formal time-out procedure was performed as per protocol, including patient name, title of procedure, and site of procedure, and all in the room concurred.  Routine monitors were applied.      The patient was placed in the left lateral decubitus position  on the procedure room table.  All pressure points were checked and comfortably padded.  Routine monitors were placed.  Vital signs were stable.    A chlorhexidine prep was completed followed by sterile draping per standard procedure.     Using C-arm AP fluoroscopy we identified center of the trapezoid of the right C3, C4, and C5 cervical vertebrae.  Then the proposed injection tracts were anesthetized with 1% lidocaine using a 1-1/2 inch  25-gauge needle.We then introduced a 25-gauge 3.5 inch spinal needles under fluoroscopic guidance  at center of the trapezoid of the target facet.  No parasthesia was elicited with needle placement and aspiration was negative for CSF or blood. Then, 0.5 mL of 0.25% bupivacaine is injected into the space.    Light pressure was held at the puncture site(s) to prevent ecchymosis and oozing.  The patient's skin was cleansed, and hemostasis was confirmed.  Band-aids were applied to the needle injection site(s).      Condition:    The patient remained awake and alert throughout the procedure.  The patient tolerated the procedure well and was monitored for approximately 15 minutes afterward in the post procedure area.  There were no immediate post procedure complications noted.  The patient was then discharged to home as per protocol.  The patient will follow up in the outpatient clinic in 4 week(s), unless otherwise clinically indicated.    Patient condition  stable.

## 2021-03-09 ENCOUNTER — THERAPY VISIT (OUTPATIENT)
Dept: PHYSICAL THERAPY | Facility: CLINIC | Age: 51
End: 2021-03-09
Payer: COMMERCIAL

## 2021-03-09 DIAGNOSIS — G89.29 CHRONIC RIGHT SHOULDER PAIN: ICD-10-CM

## 2021-03-09 DIAGNOSIS — G89.29 CHRONIC LEFT SHOULDER PAIN: ICD-10-CM

## 2021-03-09 DIAGNOSIS — M54.2 CERVICALGIA: ICD-10-CM

## 2021-03-09 DIAGNOSIS — M25.511 CHRONIC RIGHT SHOULDER PAIN: ICD-10-CM

## 2021-03-09 DIAGNOSIS — M25.512 CHRONIC LEFT SHOULDER PAIN: ICD-10-CM

## 2021-03-09 PROCEDURE — 97110 THERAPEUTIC EXERCISES: CPT | Mod: GP | Performed by: PHYSICAL THERAPIST

## 2021-03-09 PROCEDURE — 97140 MANUAL THERAPY 1/> REGIONS: CPT | Mod: GP | Performed by: PHYSICAL THERAPIST

## 2021-03-09 PROCEDURE — 97112 NEUROMUSCULAR REEDUCATION: CPT | Mod: GP | Performed by: PHYSICAL THERAPIST

## 2021-03-10 ENCOUNTER — MYC MEDICAL ADVICE (OUTPATIENT)
Dept: INTERNAL MEDICINE | Facility: CLINIC | Age: 51
End: 2021-03-10

## 2021-03-10 DIAGNOSIS — M96.1 POSTLAMINECTOMY SYNDROME: ICD-10-CM

## 2021-03-10 DIAGNOSIS — G89.29 CHRONIC RIGHT SHOULDER PAIN: ICD-10-CM

## 2021-03-10 DIAGNOSIS — M25.511 CHRONIC RIGHT SHOULDER PAIN: ICD-10-CM

## 2021-03-11 ENCOUNTER — TELEPHONE (OUTPATIENT)
Dept: INTERNAL MEDICINE | Facility: CLINIC | Age: 51
End: 2021-03-11

## 2021-03-11 RX ORDER — MELOXICAM 7.5 MG/1
7.5 TABLET ORAL DAILY
Qty: 14 TABLET | Refills: 0 | Status: SHIPPED | OUTPATIENT
Start: 2021-03-11 | End: 2021-10-15

## 2021-03-11 RX ORDER — TRAMADOL HYDROCHLORIDE 50 MG/1
50 TABLET ORAL EVERY 6 HOURS PRN
Qty: 60 TABLET | Refills: 2 | Status: SHIPPED | OUTPATIENT
Start: 2021-03-11 | End: 2021-04-29

## 2021-03-11 NOTE — TELEPHONE ENCOUNTER
"Fax received from [facility] in [place] relays that Omeprazole is not covered by insurance.  RN called pharmacy and pharmacist relayed that a PA will be needed to see if this can be covered.     Note from office visit with Juana Griffith MD from 12/2/20:        \"She continues to take omeprazole 40 mg BID and pepcid at night. \"      Ginny Buitrago RN on 3/11/2021 at 12:14 PM    "

## 2021-03-11 NOTE — TELEPHONE ENCOUNTER
Prior Authorization Retail Medication Request    Medication/Dose: omeprazole (PRILOSEC) 40 MG DR capsule      Insurance Name:    Coverage information:     Subscriber: [id number] DUNCAN HENAO     Rel to sub: 02 - Spouse     Member ID: [id number]     Payor: [employer] Ph: [phone]     Benefit plan: 1280-[employer]ICA CHOICE Ph: [phone]     Group number: [id number]     Member effective dates: from 10/01/18        Ginny Buitrago RN on 3/11/2021 at 12:17 PM

## 2021-03-12 NOTE — TELEPHONE ENCOUNTER
Prior Auth not available. Medication is excluded for patient's over age 13.            Medication: omeprazole (PRILOSEC) 40 MG DR capsule  Insurance Company: [employer] - Phone [phone] Fax [phone]  Expected CoPay:      [id number] [place] [address]  Pharmacy Notified: No  Patient Notified: No

## 2021-03-14 ENCOUNTER — MYC MEDICAL ADVICE (OUTPATIENT)
Dept: INTERNAL MEDICINE | Facility: CLINIC | Age: 51
End: 2021-03-14

## 2021-03-17 ENCOUNTER — MYC MEDICAL ADVICE (OUTPATIENT)
Dept: INTERNAL MEDICINE | Facility: CLINIC | Age: 51
End: 2021-03-17

## 2021-03-17 DIAGNOSIS — K44.9 HIATAL HERNIA: Primary | ICD-10-CM

## 2021-03-22 ENCOUNTER — TELEPHONE (OUTPATIENT)
Dept: CARDIOLOGY | Facility: CLINIC | Age: 51
End: 2021-03-22

## 2021-03-22 RX ORDER — PANTOPRAZOLE SODIUM 40 MG/1
40 TABLET, DELAYED RELEASE ORAL DAILY
Qty: 90 TABLET | Refills: 3 | Status: SHIPPED | OUTPATIENT
Start: 2021-03-22 | End: 2022-04-05

## 2021-03-22 NOTE — TELEPHONE ENCOUNTER
Prior Authorization Retail Medication Request    Medication/Dose: Ivabradine 7.5mg BID  ICD code (if different than what is on RX): Inappropriate sinus tachycardia; R00.0, Postural Orthostatic Tachycardia Syndrome, Autonomic Dysfunction; G90. 9  Previously Tried and Failed:  midodrine, fludrocortisone, beta-blockers, calcium channel blockers.  Rationale:  Need for continuity of care, safety, clinical condition.    Insurance Name:    Insurance ID:        Pharmacy Information (if different than what is on RX)  [id number] [place] [facility] [place] [address]  Phone:  [phone]

## 2021-03-22 NOTE — TELEPHONE ENCOUNTER
PA Initiation    Medication: Ivabradine 7.5mg BID -   Insurance Company: [employer] - Phone [phone] Fax [phone]  Ph[id number] [place] [facility] [place] - [address] [place] [address]  Filling Pharmacy Phone: [phone]  Filling Pharmacy Fax:    Start Date: 3/22/2021

## 2021-03-23 ENCOUNTER — TELEPHONE (OUTPATIENT)
Dept: INTERNAL MEDICINE | Facility: CLINIC | Age: 51
End: 2021-03-23

## 2021-03-23 NOTE — TELEPHONE ENCOUNTER
Fax received from [facility] in [place] relays that pantoprazole (PROTONIX) 40 MG EC tablet  is not covered by insurance.     Prior Authorization Retail Medication Request    Medication/Dose: pantoprazole (PROTONIX) 40 MG EC tablet      Insurance Name:    Coverage information:     Subscriber: [id number] DUNCAN HENAO     Rel to sub: 02 - Spouse     Member ID: [id number]     Payor: 13-[employer] Ph: [phone]     Benefit plan: 1280-[employer] CHOICE Ph: [phone]     Group number: [id number]     Member effective dates: from 10/01/18        Ginny Buitrago RN on 3/23/2021 at 1:24 PM

## 2021-03-23 NOTE — TELEPHONE ENCOUNTER
Prior Authorization Not Needed per Insurance    Medication: pantoprazole (PROTONIX) 40 MG EC tablet-PA Not Needed  Insurance Company: [employer] Part D - Phone [phone] Fax [phone]  Expected CoPay:      [id number] [place] [facility] [address]  Pharmacy Notified: Yes  Patient Notified: No    Spoke to pharmacy, they were able to process script. They were process patient's other insurance. No PA Needed.

## 2021-03-24 ENCOUNTER — TELEPHONE (OUTPATIENT)
Dept: CARDIOLOGY | Facility: CLINIC | Age: 51
End: 2021-03-24

## 2021-03-24 NOTE — TELEPHONE ENCOUNTER
Pt needs to reschedule appointment with Dr. Russell April 5th.    The appt has been cancelled; per workflow protocol.

## 2021-03-24 NOTE — TELEPHONE ENCOUNTER
"PRIOR AUTHORIZATION DENIED    Medication: Ivabradine 7.5mg BID -     Denial Date: 3/22/2021    Denial Rational:         Appeal Information:     **Please advise if appeal is necessary and place a letter of medical necessity with clinical rationale under the \"letters\" tab in patient's chart and route back to [facility] [[id number]]          "

## 2021-03-30 ENCOUNTER — TELEPHONE (OUTPATIENT)
Dept: ANESTHESIOLOGY | Facility: CLINIC | Age: 51
End: 2021-03-30

## 2021-03-30 DIAGNOSIS — M96.1 POSTLAMINECTOMY SYNDROME: ICD-10-CM

## 2021-03-30 DIAGNOSIS — G89.4 CHRONIC PAIN SYNDROME: ICD-10-CM

## 2021-03-30 RX ORDER — BUPRENORPHINE 10 UG/H
PATCH TRANSDERMAL
Qty: 4 PATCH | Refills: 2 | Status: SHIPPED | OUTPATIENT
Start: 2021-03-30 | End: 2021-04-29

## 2021-03-30 NOTE — TELEPHONE ENCOUNTER
Refill request    Medication: buprenorphine (BUTRANS) 10 MCG/HR WK patch      MNPMP Checked: Yes    Last refilled 02/23/21 for 4 patches      Last clinic appointment: 01/21/21  Next clinic appointment: 04/29/21      [facility] [place] [address]

## 2021-04-05 ENCOUNTER — TELEPHONE (OUTPATIENT)
Dept: CARDIOLOGY | Facility: CLINIC | Age: 51
End: 2021-04-05

## 2021-04-05 NOTE — LETTER
April 6, 2021      TO: Ada Welch  [address] [facility] [place] [address]        To Whom It May Concern,     I am writing on behalf of my patient, Ada Welch, to document the medical necessity of Corlanor (Ivabradine) for the treatment of Postural Orthostatic Tachycardia Syndrome (POTS) associated with persistent sinus tachycardia. This letter provides information about the patient's medical history and diagnosis and a statement summarizing my treatment rationale.      Summary of Patient History and Diagnosis  Patient was diagnosed after autonomic tilt table testing which showed an exaggerated orthostatic heart rate response. Pharmacological approaches to therapy have been ineffective and have failed to subdue the excessive sinus tachycardia and her symptoms, which have included fludrocortisone (glucocorticoid), three beta-adrenergic blocking agents (beta-blockers) atenolol, propranolol, and metoprolol, and an alpha-1 agonist, midodrine due to side effects. The excessive sinus tachycardia has caused symptoms of dizziness, lightheadedness, near-syncope, exercise intolerance, palpitations, dyspnea, and overwhelming fatigue.    She has been taking Ivabradine for many years, previously covered by her insurance, with great response.      Treatment Rationale  Although beta-blockers are first line agents for sinus tachycardia, Ada has failed these medications due to undesirable side-effects. These medications lower baseline blood pressures in combination with lowering one s heart rate. Patient with clinical POTS have baseline hypotension, making beta-blockers insufficient as always being a first line approach to POTS treatment. Already prone to syncope, beta-blockers exaggerate this effect in POTS patients. Ivabradine has been proven to be safe and effective in controlling the exaggerated heart rate response in POTS patients without it having any effect on blood pressure. Ivabradine has shown to be effective  in reducing symptoms of lightheadedness, syncope, fatigue, dyspnea, lack of concentration, and palpitations in those with clinical POTS. With baseline hypotension, use of Ivabradine is clinically indicated in Ada's case. Please see a very recent meta-analysis of this data in article sited below.     POLLY Griffith, LIS Guillaume, PRESLEY Soriano, DRE Pearson, & MIKE Li (2020). Ivabradine in postural orthostatic tachycardia syndrome: A review of the literature. Cureus, 12(4). https://doi.org/10/7759/cureus.7868        Duration  Indefinite     Summary  In summary, Corlanor (Ivabradine) is medically necessary for this patient s medical condition. Please call my office at [phone] if I can provide you with any additional information to approve my request. I look forward to receiving your timely response and approval of this request.         Sincerely,     Kirk Russell MD, FACC, [facility]  Clinical Electrophysiology

## 2021-04-05 NOTE — TELEPHONE ENCOUNTER
"N/A, PA was submitted and denied on 3/22. If provider would like to appeal please place a letter of medical necessity under the \"letters\" tab of patients chart and route back to writer.  "

## 2021-04-06 NOTE — TELEPHONE ENCOUNTER
Medication Appeal Initiation    We have initiated an appeal for the requested medication:  Medication: ivabradine (CORLANOR) 7.5 MG tablet  Appeal Start Date:  4/6/2021  Insurance Company: [employer] ([facility]) - Phone [phone] Fax [phone]  Comments:  Sent in appeal with letter

## 2021-04-14 NOTE — NURSING NOTE
Scarlett Welch   MRN: [id number]  49 year old, 1970  EDS (with POTS) & Elevated FVIII (352,280)    Saw Saravananf today with SUZANNE Castaneda.  She is planning a lap hernia repair on 2/10/20 at the [facility] () with Dr. Mack.  We will check F8 level today as well as ferritin level (she eats ice).  She still has heavy period 7-9 days where heavy 5 days (changing pad/tampon every 1-2 hours).  She bruises easy and has had nosebleeds (more when younger). She has had surgeries in past without bleeding (spinal fusion and revision, multiple nasal surgeries).  She has never had a DVT.  Reviewed signs & symptoms of DVT/PE and high risk times for venous clots.  Depending on lab results, may recommend Lovenox 40 mg daily x 7.  Patient is familiar with giving self injections.  Gladis Forman, RN, MSN -Nurse Clinician, [facility] [phone]   Yes... No

## 2021-04-21 ENCOUNTER — VIRTUAL VISIT (OUTPATIENT)
Dept: GASTROENTEROLOGY | Facility: CLINIC | Age: 51
End: 2021-04-21
Payer: COMMERCIAL

## 2021-04-21 VITALS — HEIGHT: 67 IN | WEIGHT: 170 LBS | BODY MASS INDEX: 26.68 KG/M2

## 2021-04-21 DIAGNOSIS — K62.5 BRIGHT RED BLOOD PER RECTUM: Primary | ICD-10-CM

## 2021-04-21 DIAGNOSIS — Q79.60 EHLERS-DANLOS SYNDROME: ICD-10-CM

## 2021-04-21 DIAGNOSIS — R11.0 NAUSEA: ICD-10-CM

## 2021-04-21 DIAGNOSIS — Z98.890 HISTORY OF NISSEN FUNDOPLICATION: ICD-10-CM

## 2021-04-21 PROCEDURE — 99214 OFFICE O/P EST MOD 30 MIN: CPT | Mod: 95 | Performed by: INTERNAL MEDICINE

## 2021-04-21 ASSESSMENT — MIFFLIN-ST. JEOR: SCORE: 1428.74

## 2021-04-21 NOTE — PATIENT INSTRUCTIONS
PLAN  ---Try blanca for nausea   ---Dr. Mack's dietician   ---Recent blood per rectum likely from hemorrhoids, which were noted on last colonoscopy. But if bleeding recurs, Ada will let me know right away and we will consider a repeat colonoscopy.

## 2021-04-21 NOTE — PROGRESS NOTES
"Ada Welch is a 50 year old female who is being evaluated via a billable video visit.      The patient has been notified of following:     \"This video visit will be conducted via a call between you and your physician/provider. We have found that certain health care needs can be provided without the need for an in-person physical exam.  This service lets us provide the care you need with a video conversation.  If a prescription is necessary we can send it directly to your pharmacy.  If lab work is needed we can place an order for that and you can then stop by our lab to have the test done at a later time.    If during the course of the call the physician/provider feels a video visit is not appropriate, you will not be charged for this service.\"     Patient confirmed that they are in [place] for today's visit yes.    Video-Visit Details  Type of service:  Video Visit    Video Start Time: 3:44 PM  Video End Time:  4:15 PM    Originating Location (pt. Location): [place]    Distant Location (provider location):  [facility] [place]     Platform used: [facility]      GI CLINIC VISIT    CC/REFERRING MD:  Jerad Hebert  REASON FOR CONSULTATION:   Jerad Hebert for   Chief Complaint   Patient presents with     Follow Up     ASSESSMENT/PLAN:  Ms. 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 Of note, she was previously seen for dysphagia and esophageal eosinophilia by Dr. Roland, who thought that the findings may be reflux-related in the setting of a hiatal hernia. EGD that was repeated after she completed an 8 week trial of high dose PPI showed resolution of eosinophils.  She is now s/p nissen fundoplication 2/2020. She reports nausea, especially with certain foods (meat) and continued Reglan use.     She " "is doing well in terms of her bowel habits, off laxatives. This is great news. She has not followed up with the [facility] yet.     # Nausea  # Recent episode of BRBPR   # s/p Nissen fundoplication, 2/2020  ---Try blanca for nausea   ---Will get in touch with Dr. Mack's team to set up a visit with a dietician   ---Recent blood per rectum likely from hemorrhoids, which were noted on last colonoscopy. But if bleeding recurs, Ada will let me know right away and we will consider a repeat colonoscopy.    # Rectal intussusception   # Pelvic floor dysfunction  # Tom-Danlos syndrome  ---Follow up with [facility]    # Colon cancer screening   Given EDS, colonoscopies may be too high risk. Will consider colon cancer screening via stool tests.   ---colon cancer screening via stool tests     RTC: Return in about 6 months (around 10/21/2021) for with Мария Hallman PA-C.       HPI  Ms. 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  Patient last seen in 2018 with Dr. Griffith.  At the time of consultation, she was in the process of work up for mastocytosis (urine showed elevated histamine levels, but patient was on antacid therapy and Mobic at that time). PMHx also includes 5 vaginal deliveries, migraines, cervicalgia, chronic pain syndrome, degenerative joint disease, chronic pan sinusitis.  She had been referred to the GI clinic for evaluation of possible IBS with mixed stools.     At consultation:  She has had lifelong symptoms of abdominal distension, bloating and borborygmi shortly after eating.  Stools alternates between loose stools and constipation. Usually, she has constipation in the form of hard stools and  incomplete evacuation. Performs splinting (applies posterior vaginal wall pressure with fingers) to help evacuate stool. Had \"pelvic floor releases\" with PT in [place], [place] and \"[facility]\" in [place], [place] with some help. Period of constipation will last for 1 month; during this time, will " have 1 BM weekly with Miralax. Endorses lower abdominal pain that worsens with constipation.  Previously failed Miralax, senna, dulcolax, fiber, suppositories.  Period of constipation is then followed by several days of loose stools. Ms. Welch endorses occasional BRB with straining in the toilet and TP x 20 years. Cscope 3/2017 showed small mouthed diverticulosis in the sigmoid and 1 hyperplastic polyp; repeat cscope in 10 years was the recommendation. Uses buprenorphine patch (opioid) x 3 years. She saw [facility] with recommendation for biofeedback therapy. Rectal intussusception was noted and will likely need surgical repair in the future. Linzess had been very helpful with constipation and bloating and allowed for her to have a daily BM, however she did not return to the clinic to have it refilled and discontinued it.     She had been taking 20 mg prilosec during the day and pepcid at night with adequate heartburn control at consultation.    At her last follow up visit, she developed dysphagia.  This was assessed with EGD that was concerning for EoE grossly. She had been on prilosec 20mg + Pepcid prior to the scope. Had not olga on twice daily PPI prior to scope. Per Dr. Roland's assessment in 2017, the thought was that the eosinophilia (previously seen on prior endoscopy) may be 2/2 reflux in the setting of a hiatal hernia. She continued to have dysphagia to primarily meat, liquids were ok.  She had manometry that was abnormal with weak lower esophageal pressure, hiatal hernia, possible short esophagus though stature of patient needs to be considered. peristalisis preserved. She then had hernia repair, nissen and PEG 2/10/20.    Over all symptoms went well, did not need to use PEG tube and was removed. Still has some issues with solids (mostly meats but can be breads, and need to drink quite a bit in order to help prevent getting stuck), liquids are ok unless she drinks too fast. No vomiting. No heartburn. She continues  to take omeprazole 40 mg BID and pepcid at night.      Every time she eats she experiences abdominal discomfort with cramping that last for hours.  Constantly nauseated. She is not on a bowel regimen (previously had been on linzess with good success).  Over the last couple weeks she has had a BM daily to every other day, however before this time she would go several days without a BM then have several stools in a row.  No blood in the stool.  She started an antiinflammatory diet which for her consists primarily of fruits, vegetables and meats.    She continues to use cannabis nightly for sleep. She takes Reglan BID (prescribed by neurologist and PCP, we discussed possible side effects today and danger of long term use, although none present now), takes naproxen once a week and continues to use buprenorphine pain pain weekly.     Interval history, 12/2020  Fundoplication went well. Feels that heartburn has improved significantly.  Last saw [facility] over a year ago. Was seen in 5/2018 with recommendations for biofeedback therapy and likely surgical correction of rectal intussusception. Ada did not undergo biofeedback therapy.  She had a follow up visit at the [facility] who recommended surgery but this was not scheduled due to prioritization of the nissen and due to other health issues, and then COVID.   In the meantime, restarted Linzess 145 mcg daily in Summer 2020, but this was not helpful.     Currently off Linzess. Alternates between regular BMs x 2 days, then diarrhea.     Had a cscope in 2017 with a small sigmoid polyp, that was hyperplastic.     Interval history, 4/2021  Continues on Reglan to avoid nausea, especially worse difficult to digest foods, like meat. Avoids steak, chicken, pork chops. Not seeing a nutritionist. Lost some weight semi-intentionally. Following a keto diet.     Having at least 1 BM per day. Fresh veggies help with regularity.   No longer on Linzess about a month ago; painless bright red blood  in stool that occurred on Linzess and stopped with medication cessation. Not on OTC laxatives.   Has not followed up with [facility] yet.     ROS:    No fevers or chills  No weight loss  No blurry vision, double vision or change in vision  No sore throat  No lymphadenopathy  + chronic migraines   No shortness of breath or wheezing  No chest pain or pressure  No rashes or skin changes  + dysphagia (meats on occasion)  No melena  No dysuria, frequency or urgency  No hot/cold intolerance or polyria  + anxiety or depression    PROBLEM LIST  Patient Active Problem List    Diagnosis Date Noted     Chronic right shoulder pain 02/17/2021     Priority: Medium     Arthropathy of cervical facet joint 01/25/2021     Priority: Medium     Added automatically from request for surgery [id number]       ERRONEOUS ENCOUNTER--DISREGARD 12/22/2020     Priority: Medium     Cervical pain 09/29/2020     Priority: Medium     Chronic pain of both shoulders 09/29/2020     Priority: Medium     Cervical facet joint syndrome 09/29/2020     Priority: Medium     Added automatically from request for surgery [id number]       Facet arthropathy, cervical 09/02/2020     Priority: Medium     Added automatically from request for surgery [id number]       Hiatal hernia 02/10/2020     Priority: Medium     s/p hiatal hernia repair, nissen, PEG, 2/10 01/22/2020     Priority: Medium     Added automatically from request for surgery [id number]       POTS (postural orthostatic tachycardia syndrome) 10/23/2019     Priority: Medium     Autonomic dysfunction 06/07/2018     Priority: Medium     Low back pain of multiple sites of spine with sciatica 12/09/2017     Priority: Medium     Intractable chronic migraine without aura and without status migrainosus 12/09/2017     Priority: Medium     Obese 12/09/2017     Priority: Medium     Urinary urgency 08/30/2017     Priority: Medium     Nocturia 08/30/2017     Priority: Medium     Cervicalgia 08/25/2017     Priority: Medium     Headache  07/25/2017     Priority: Medium     Postlaminectomy syndrome, lumbar region 06/14/2017     Priority: Medium     Lymphadenopathy of left cervical region 03/27/2017     Priority: Medium     Easy bruising 10/13/2016     Priority: Medium     Head and neck lymphadenopathy 10/13/2016     Priority: Medium     Hepatomegaly 10/13/2016     Priority: Medium     Liver lesion 10/13/2016     Priority: Medium     Chronic left shoulder pain 09/05/2016     Priority: Medium     s/p TLIF L4-5,L5-S1 with solid arthrodesis 09/05/2016     Priority: Medium     Tom-Danlos syndrome 09/05/2016     Priority: Medium     Acquired hypothyroidism 07/22/2016     Priority: Medium     Anxiety 07/22/2016     Priority: Medium     Opioid dependence (H) 07/22/2016     Priority: Medium     Overview:   Treatment Agreement       Essential hypertension with goal blood pressure less than 140/90 07/22/2016     Priority: Medium     Low back pain 07/12/2016     Priority: Medium     Lumbar radiculopathy 02/18/2016     Priority: Medium     Encounter for genetic counseling and testing 01/05/2016     Priority: Medium     Overview:     [facility] Aortopathy Comprehensive Panel ordered 1/5/2016.       Sinus tachycardia 09/29/2015     Priority: Medium     Astigmatism 12/12/2014     Priority: Medium     Keratoconjunctivitis sicca (H) 12/12/2014     Priority: Medium     Presbyopia 12/12/2014     Priority: Medium     Opioid type dependence, continuous (H) 10/09/2014     Priority: Medium     Other acute postoperative pain 10/09/2014     Priority: Medium     Anxiety disorder 08/21/2014     Priority: Medium     Pain disorder associated with psychological and physical factors 08/21/2014     Priority: Medium     Low back pain radiating to both legs 07/08/2014     Priority: Medium     Arthritis of facet joints at multiple vertebral levels 01/01/2014     Priority: Medium     Spondylolisthesis of lumbar region 12/10/2013     Priority: Medium     Lumbar degenerative disc disease  11/21/2013     Priority: Medium     DNS (deviated nasal septum) 10/09/2012     Priority: Medium     Nasal turbinate hypertrophy 10/09/2012     Priority: Medium     Dysuria 07/12/2011     Priority: Medium     Chronic sinusitis with recurrent bronchitis 02/01/2007     Priority: Medium     Allergic rhinitis 01/09/2007     Priority: Medium     Cyst of fallopian tube 12/28/1996     Priority: Medium     Arthritis involving multiple sites 01/01/1986     Priority: Medium     Uterine endometriosis 01/01/1986     Priority: Medium       PERTINENT PAST MEDICAL HISTORY:  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     Cervicalgia      Chronic sinusitis 00/2007    Diagnosed with chronic pansinusitis 2016     Coagulation disorder (H)     factor 8     CSF leak     Chiari malformation     Depression      Tom-Danlos disease      Eosinophilic esophagitis 08/2018     Gastroesophageal reflux disease 3/1/2017    I have large hiatal hernia     Hiatal hernia 2016    Have adeline hiatel hernia     Hoarseness Unsure    It comes and goes     Hypertension      Hypothyroid      IBS (irritable bowel syndrome) with constipation     improved on Linzess     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     Irregular heart beat      Migraines 1/1/2007    Unsure of exact date     Nasal polyps 2013    Were revoved 03/2017, benign     Orthostatic hypotension      Other nervous system complications 1/2014    Autonomic nervous system disorder, neuralgia, neuropathy     Swelling, mass, or lump in head and neck 08/2016    Lymph node has been growing for last year     Thyroid disease 01/01/2008     Urinary incontinence      Urinary  urgency    Was told she had an ulcer at age 15 in the setting of life stress. Not sure if she had an EGD at that time.       PREVIOUS SURGERIES:  Past Surgical History:   Procedure Laterality Date     AS REPAIR OF NASAL SEPTUM  2009    repair broke nose     BACK SURGERY  12/09/2013  10/01/2014    Spinal fusion L4-S1, L5 moving 5/8ths in. Remove screws/rods     BIOPSY  01/01/2008 & 3/2017    mole biopsy, lymph node biopsy     COLONOSCOPY  3/2017    Colonoscopy and GI     ESOPHAGEAL IMPEDENCE FUNCTION TEST WITH 24 HOUR PH GREATER THAN 1 HOUR N/A 9/17/2019    Procedure: IMPEDANCE PH STUDY, ESOPHAGUS, 24 HOUR;  Surgeon: Raghavendra Grande MD;  Location:  GI     ESOPHAGOSCOPY, GASTROSCOPY, DUODENOSCOPY (EGD), COMBINED N/A 8/3/2018    Procedure: COMBINED ESOPHAGOSCOPY, GASTROSCOPY, DUODENOSCOPY (EGD), BIOPSY SINGLE OR MULTIPLE;;  Surgeon: Juan Woodson MD;  Location: [facility] GI     ESOPHAGOSCOPY, GASTROSCOPY, DUODENOSCOPY (EGD), COMBINED N/A 10/22/2018    Procedure: COMBINED ESOPHAGOSCOPY, GASTROSCOPY, DUODENOSCOPY (EGD), BIOPSY SINGLE OR MULTIPLE;  Surgeon: Sadia Carolina MD;  Location: [facility] GI     ESOPHAGOSCOPY, GASTROSCOPY, DUODENOSCOPY (EGD), COMBINED N/A 12/17/2018    Procedure: COMBINED ESOPHAGOSCOPY, GASTROSCOPY, DUODENOSCOPY (EGD);  Surgeon: Juan Woodson MD;  Location: [facility] GI     INJECT BLOCK MEDIAL BRANCH CERVICAL/THORACIC/LUMBAR Left 9/24/2020    Procedure: BLOCK, NERVE, FACET JOINT, MEDIAL BRANCH, DIAGNOSTIC;  Surgeon: Faiza Martinez MD;  [facility]     INJECT BLOCK MEDIAL BRANCH CERVICAL/THORACIC/LUMBAR Bilateral 10/8/2020    Procedure: Bilateral cervical 3, cervical 4, and cervical 5 medial branch nerve block;  Surgeon: Faiza Martinez MD;  Location: [facility] OR     INJECT BLOCK MEDIAL BRANCH CERVICAL/THORACIC/LUMBAR Right 3/4/2021    Procedure: Cervical 3, cervical 4, cervical 5 medial branch nerve blocks to target C3-C4 and C4-C5 facet joint;  Surgeon: Faiza Martinez MD;  Location: [facility] OR      LAPAROSCOPIC HERNIORRHAPHY HIATAL N/A 2/10/2020    Procedure: LAPAROSCOPIC HIATAL HERNIA REPAIR, NISSEN FUNDOPLICATION, ESOPHOGASTRODUODENOSCOPY, PEG TUBE PLACEMENT.;  Surgeon: Alana Mack MD;  Location: [facility] OR     NASAL/SINUS POLYPECTOMY  2017    Polyps were benign     NOSE SURGERY  2007    deviated septum     TONSILLECTOMY & ADENOIDECTOMY  1981     TUBAL LIGATION  07/01/2011       PREVIOUS ENDOSCOPY:  EGD 8/2017: findings s/f EoE, 3 cm HH. Path showed 40 eos/HPF in distal and mid esophagus. Mild acute inflammation and occasional eos.   EGD 3/2017: EGD with ringed esophagus with distal bx showing increased eos, min eos in mid esophagus.   cscope 3/2017: small-mouthed, mild diverticulosis, 1 small hyperplastic polyp. Recommended to repeat cscope in 10 years (given path).     ALLERGIES:     Allergies   Allergen Reactions     Bee Venom Shortness Of Breath, Palpitations, Anaphylaxis and Difficulty breathing     Patient does carry Epi-Pen     Chicken-Derived Products (Egg) Nausea and Diarrhea     Compazine [Prochlorperazine]      Extreme jittery     Food      Milk     Gabapentin      Dizzy     Gluten Meal GI Disturbance     Wheat bran and wheat     Pregabalin      Seasonal Allergies      Dust, mold     Topiramate      Pt does not remember reaction       PERTINENT MEDICATIONS:    Current Outpatient Medications:      albuterol (PROAIR HFA/PROVENTIL HFA/VENTOLIN HFA) 108 (90 BASE) MCG/ACT Inhaler, Inhale 2 puffs into the lungs every 6 hours as needed , Disp: , Rfl:      amitriptyline (ELAVIL) 10 MG tablet, Take 3 tablets (30 mg) by mouth At Bedtime, Disp: 90 tablet, Rfl: 1     buprenorphine (BUTRANS) 10 MCG/HR WK patch, APPLY 1 PATCH ONCE A WEEK, Disp: 4 patch, Rfl: 2     cetirizine (ZYRTEC) 10 MG tablet, Take 10 mg by mouth daily as needed , Disp: , Rfl:      diclofenac (VOLTAREN) 1 % topical gel, Place 2 g onto the skin 4 times daily, Disp: 1 Tube, Rfl: 0     DULoxetine (CYMBALTA) 30 MG capsule, Take 60mg in the  morning and 30mg at bedtime, Disp: 270 capsule, Rfl: 3     EPINEPHrine (EPIPEN/ADRENACLICK/OR ANY BX GENERIC EQUIV) 0.3 MG/0.3ML injection 2-pack, INJECT 0.3 MLS (0.3 MG) INTO THE MUSCLE AS NEEDED FOR ANAPHYLAXIS, Disp: , Rfl: 1     famotidine (PEPCID) 20 MG tablet, Take 1 tablet (20 mg) by mouth At Bedtime, Disp: 90 tablet, Rfl: 3     ferrous fumarate 65 mg, [place]. FE,-Vitamin C 125 mg (VITRON-C)  MG TABS tablet, Take 1 tablet by mouth every other day, Disp: 60 tablet, Rfl: 1     fluticasone (FLONASE) 50 MCG/ACT nasal spray, Spray 2 sprays into both nostrils daily, Disp: 48 mL, Rfl: 0     ivabradine (CORLANOR) 7.5 MG tablet, Take 1 tablet (7.5 mg) by mouth 2 times daily (with meals), Disp: 180 tablet, Rfl: 3     levothyroxine (SYNTHROID/LEVOTHROID) 50 MCG tablet, Take 1 tablet (50 mcg) by mouth daily Repeat labs in 2 months, Disp: 90 tablet, Rfl: 1     Lidocaine (LIDOCARE) 4 % Patch, Place 1-3 patches onto the skin every 24 hours To prevent lidocaine toxicity, patient should be patch free for 12 hrs daily., Disp: 20 patch, Rfl: 0     linaclotide (LINZESS) 145 MCG capsule, Take 1 capsule (145 mcg) by mouth every morning (before breakfast), Disp: 90 capsule, Rfl: 3     medical cannabis (Patient's own supply.  Not a prescription), Take 1 Dose by mouth See Admin Instructions (This is NOT a prescription, and does not certify that the patient has a qualifying medical condition for medical cannabis.  The purpose of this order is  to document that the patient reports taking medical cannabis.)   Capsule formulation - uses as needed (currently out of this and not using as of January 28, 2020), Disp: , Rfl:      metoclopramide (REGLAN) 10 MG tablet, Take 1 tablet (10 mg) by mouth 4 times daily as needed (headache), Disp: 40 tablet, Rfl: 3     Multiple Vitamins-Minerals (MULTIVITAMIN PO), Take 1 tablet by mouth daily , Disp: , Rfl:      naloxone (NARCAN) 4 MG/0.1ML nasal spray, Spray 1 spray (4 mg) into one nostril  alternating nostrils once as needed for opioid reversal every 2-3 minutes until assistance arrives, Disp: 0.2 mL, Rfl: 0     naproxen sodium (ANAPROX) 220 MG tablet, Take 440-660 mg by mouth daily as needed for moderate pain, Disp: , Rfl:      omeprazole (PRILOSEC) 40 MG DR capsule, Take 1 capsule (40 mg) by mouth 2 times daily as needed (prn), Disp: 180 capsule, Rfl: 3     ondansetron (ZOFRAN-ODT) 4 MG ODT tab, Take 1-2 tablets (4-8 mg) by mouth every 12 hours as needed for nausea, Disp: 60 tablet, Rfl: 1     pantoprazole (PROTONIX) 40 MG EC tablet, Take 1 tablet (40 mg) by mouth daily, Disp: 90 tablet, Rfl: 3     Probiotic Product (PROBIOTIC DAILY PO), Take 2 packets by mouth every morning , Disp: , Rfl:      propranolol ER (INDERAL LA) 120 MG 24 hr capsule, Take 1 capsule (120 mg) by mouth daily, Disp: 30 capsule, Rfl: 11     rizatriptan (MAXALT-MLT) 5 MG ODT, TAKE 1-2 TABLETS (5-10 MG) BY MOUTH AT ONSET OF HEADACHE FOR MIGRAINE (MAX 30 MG IN 24 HOURS), Disp: 18 tablet, Rfl: 11     SUMAtriptan (IMITREX STATDOSE) 6 MG/0.5ML pen injector kit, Inject 0.5 ml (6 mg) subcutaneously at onset of migraine, May repeat in 1 hour , Max 12 mg/24 hrs, Disp: 2 kit, Rfl: 11     tiZANidine (ZANAFLEX) 4 MG tablet, Take 1 tablet (4 mg) by mouth 4 times daily as needed for muscle spasms, Disp: 360 tablet, Rfl: 5     traMADol (ULTRAM) 50 MG tablet, TAKE 1 TABLET (50 MG) BY MOUTH EVERY 6 HOURS AS NEEDED FOR SEVERE PAIN, Disp: 60 tablet, Rfl: 2     vitamin B complex with vitamin C (VITAMIN  B COMPLEX) TABS tablet, Take 1 tablet by mouth daily, Disp: , Rfl:      VITAMIN D, CHOLECALCIFEROL, PO, Take 2,000 Units by mouth daily, Disp: , Rfl:      meloxicam (MOBIC) 7.5 MG tablet, TAKE 1 TABLET (7.5 MG) BY MOUTH DAILY FOR 14 DAYS, Disp: 14 tablet, Rfl: 0    SOCIAL HISTORY:  Social History     Socioeconomic History     Marital status:      Spouse name: Carry     Number of children: 5     Years of education: Not on file     Highest  education level: Not on file   Occupational History     Not on file   Social Needs     Financial resource strain: Not on file     Food insecurity     Worry: Not on file     Inability: Not on file     Transportation needs     Medical: Not on file     Non-medical: Not on file   Tobacco Use     Smoking status: Former Smoker     Packs/day: 0.20     Years: 10.00     Pack years: 2.00     Types: Cigarettes     Start date: 1991     Quit date: 2013     Years since quittin.3     Smokeless tobacco: Never Used   Substance and Sexual Activity     Alcohol use: Yes     Comment: once a month     Drug use: Yes     Types: Marijuana     Comment: medical marijuana     Sexual activity: Yes     Partners: Male     Birth control/protection: Female Surgical   Lifestyle     Physical activity     Days per week: Not on file     Minutes per session: Not on file     Stress: Not on file   Relationships     Social connections     Talks on phone: Not on file     Gets together: Not on file     Attends Alevism service: Not on file     Active member of club or organization: Not on file     Attends meetings of clubs or organizations: Not on file     Relationship status: Not on file     Intimate partner violence     Fear of current or ex partner: Not on file     Emotionally abused: Not on file     Physically abused: Not on file     Forced sexual activity: Not on file   Other Topics Concern     Parent/sibling w/ CABG, MI or angioplasty before 65F 55M? No   Social History Narrative    5 kids: 26 son, 25 daughter, 21 daughter, 15 Leslie, 9 Ibeth yo     works at [employer] for disability        3 yo and 6 months grandchildren Marcelo and Jennifer       FAMILY HISTORY:  Family History   Problem Relation Age of Onset     Connective Tissue Disorder Mother         eds     Connective Tissue Disorder Sister         eds     Cancer Father         Spinal tumor grew into spinal cord, went in brain     Migraines Father      Diabetes  "Maternal Grandmother         Elderly diabetes     Heart Disease Maternal Grandmother      Hypertension Maternal Grandmother      Diabetes Paternal Grandmother         Elderly diabetes     Diabetes Paternal Grandfather         Elderly diabetes     Asthma Sister         Pediatric Asthma     Migraines Sister      Asthma Son         Pediatric Asthma     Thyroid Disease Sister         ,     Migraines Sister      Migraines Sister      Breast Cancer No family hx of        Past/family/social history reviewed and no changes    PHYSICAL EXAMINATION:  Constitutional: aaox3, cooperative, pleasant, not dyspneic/diaphoretic, no acute distress  Vitals reviewed: Ht 1.702 m (5' 7\")   Wt 77.1 kg (170 lb)   BMI 26.63 kg/m    Wt:   Wt Readings from Last 2 Encounters:   04/21/21 77.1 kg (170 lb)   03/04/21 78 kg (172 lb)        General appearance  Healthy appearing adult, in no acute distress     Eyes  Sclera anicteric  Pupils round and reactive to light     Ears, nose, mouth and throat  No obvious external lesions of ears and nose  Hearing intact     Neck  Symmetric  No obvious external lesions     Respiratory  Normal respiration, no use of accessory muscles      MSK  Gait normal     Skin  No rashes or jaundice      Psychiatric  Oriented to person, place and time  Appropriate mood and affect.     PERTINENT STUDIES:  Labs 11/2020 normal   GES 10/2017: rapid gastric emptying  Video swallow: mod HH (mixed type), deep penetration without aspiration.     "

## 2021-04-21 NOTE — TELEPHONE ENCOUNTER
MEDICATION APPEAL DENIED    Medication: ivabradine (CORLANOR) 7.5 MG tablet    Denial Date: 4/21/2021    Denial Rational:         Second Level Appeal Information:     Second level appeals will be managed by the clinic staff and provider. Please contact the [facility] Prior Authorization Team if additional information about the denial is needed.

## 2021-04-21 NOTE — LETTER
"    4/21/2021         RE: Ada Welch  [address]  [place] [address]        Dear Colleague,    Thank you for referring your patient, Ada Welch, to the [facility] [place]. Please see a copy of my visit note below.    Ada Welch is a 50 year old female who is being evaluated via a billable video visit.      The patient has been notified of following:     \"This video visit will be conducted via a call between you and your physician/provider. We have found that certain health care needs can be provided without the need for an in-person physical exam.  This service lets us provide the care you need with a video conversation.  If a prescription is necessary we can send it directly to your pharmacy.  If lab work is needed we can place an order for that and you can then stop by our lab to have the test done at a later time.    If during the course of the call the physician/provider feels a video visit is not appropriate, you will not be charged for this service.\"     Patient confirmed that they are in [place] for today's visit yes.    Video-Visit Details  Type of service:  Video Visit    Video Start Time: 3:44 PM  Video End Time:  4:15 PM    Originating Location (pt. Location): Home    Distant Location (provider location):  [facility] [place]     Platform used: [facility]      GI CLINIC VISIT    CC/REFERRING MD:  Jerad Hebert  REASON FOR CONSULTATION:   Jerad Hebert for   Chief Complaint   Patient presents with     Follow Up     ASSESSMENT/PLAN:  Ms. Welch is a 51 yo woman with Tmo Danlos syndrome, dysphagia, 5 vaginal deliveries, possible mastocytosis, migraines, cervicalgia, chronic pain syndrome, degenerative joint disease, chronic sinusitis who is following up. Of note, she was previously seen for dysphagia and esophageal eosinophilia by Dr. Roland, who thought that the findings may be reflux-related in the " setting of a hiatal hernia. EGD that was repeated after she completed an 8 week trial of high dose PPI showed resolution of eosinophils.  She is now s/p nissen fundoplication 2/2020. She reports nausea, especially with certain foods (meat) and continued Reglan use.     She is doing well in terms of her bowel habits, off laxatives. This is great news. She has not followed up with the [facility] yet.     # Nausea  # Recent episode of BRBPR   # s/p Nissen fundoplication, 2/2020  ---Try blanca for nausea   ---Will get in touch with Dr. Mack's team to set up a visit with a dietician   ---Recent blood per rectum likely from hemorrhoids, which were noted on last colonoscopy. But if bleeding recurs, Ada will let me know right away and we will consider a repeat colonoscopy.    # Rectal intussusception   # Pelvic floor dysfunction  # Tom-Danlos syndrome  ---Follow up with [facility]    # Colon cancer screening   Given EDS, colonoscopies may be too high risk. Will consider colon cancer screening via stool tests.   ---colon cancer screening via stool tests     RTC: Return in about 6 months (around 10/21/2021) for with Мария Hallman PA-C.       HPI  Ms. 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  Patient last seen in 2018 with Dr. Griffith.  At the time of consultation, she was in the process of work up for mastocytosis (urine showed elevated histamine levels, but patient was on antacid therapy and Mobic at that time). PMHx also includes 5 vaginal deliveries, migraines, cervicalgia, chronic pain syndrome, degenerative joint disease, chronic pan sinusitis.  She had been referred to the GI clinic for evaluation of possible IBS with mixed stools.     At consultation:  She has had lifelong symptoms of abdominal distension, bloating and borborygmi shortly after eating.  Stools alternates between loose stools and constipation. Usually, she has constipation in the form of hard stools and  incomplete  "evacuation. Performs splinting (applies posterior vaginal wall pressure with fingers) to help evacuate stool. Had \"pelvic floor releases\" with PT in [place], [place] and [facility]\" in [place], [place] with some help. Period of constipation will last for 1 month; during this time, will have 1 BM weekly with Miralax. Endorses lower abdominal pain that worsens with constipation.  Previously failed Miralax, senna, dulcolax, fiber, suppositories.  Period of constipation is then followed by several days of loose stools. Ms. Welch endorses occasional BRB with straining in the toilet and TP x 20 years. Cscope 3/2017 showed small mouthed diverticulosis in the sigmoid and 1 hyperplastic polyp; repeat cscope in 10 years was the recommendation. Uses buprenorphine patch (opioid) x 3 years. She saw [facility] with recommendation for biofeedback therapy. Rectal intussusception was noted and will likely need surgical repair in the future. Linzess had been very helpful with constipation and bloating and allowed for her to have a daily BM, however she did not return to the clinic to have it refilled and discontinued it.     She had been taking 20 mg prilosec during the day and pepcid at night with adequate heartburn control at consultation.    At her last follow up visit, she developed dysphagia.  This was assessed with EGD that was concerning for EoE grossly. She had been on prilosec 20mg + Pepcid prior to the scope. Had not olga on twice daily PPI prior to scope. Per Dr. Roland's assessment in 2017, the thought was that the eosinophilia (previously seen on prior endoscopy) may be 2/2 reflux in the setting of a hiatal hernia. She continued to have dysphagia to primarily meat, liquids were ok.  She had manometry that was abnormal with weak lower esophageal pressure, hiatal hernia, possible short esophagus though stature of patient needs to be considered. peristalisis preserved. She then had hernia repair, nissen and PEG 2/10/20.    Over all " symptoms went well, did not need to use PEG tube and was removed. Still has some issues with solids (mostly meats but can be breads, and need to drink quite a bit in order to help prevent getting stuck), liquids are ok unless she drinks too fast. No vomiting. No heartburn. She continues to take omeprazole 40 mg BID and pepcid at night.      Every time she eats she experiences abdominal discomfort with cramping that last for hours.  Constantly nauseated. She is not on a bowel regimen (previously had been on linzess with good success).  Over the last couple weeks she has had a BM daily to every other day, however before this time she would go several days without a BM then have several stools in a row.  No blood in the stool.  She started an antiinflammatory diet which for her consists primarily of fruits, vegetables and meats.    She continues to use cannabis nightly for sleep. She takes Reglan BID (prescribed by neurologist and PCP, we discussed possible side effects today and danger of long term use, although none present now), takes naproxen once a week and continues to use buprenorphine pain pain weekly.     Interval history, 12/2020  Fundoplication went well. Feels that heartburn has improved significantly.  Last saw [facility] over a year ago. Was seen in 5/2018 with recommendations for biofeedback therapy and likely surgical correction of rectal intussusception. Ada did not undergo biofeedback therapy.  She had a follow up visit at the [facility] who recommended surgery but this was not scheduled due to prioritization of the nissen and due to other health issues, and then COVID.   In the meantime, restarted Linzess 145 mcg daily in Summer 2020, but this was not helpful.     Currently off Linzess. Alternates between regular BMs x 2 days, then diarrhea.     Had a cscope in 2017 with a small sigmoid polyp, that was hyperplastic.     Interval history, 4/2021  Continues on Reglan to avoid nausea, especially worse difficult  to digest foods, like meat. Avoids steak, chicken, pork chops. Not seeing a nutritionist. Lost some weight semi-intentionally. Following a keto diet.     Having at least 1 BM per day. Fresh veggies help with regularity.   No longer on Linzess about a month ago; painless bright red blood in stool that occurred on Linzess and stopped with medication cessation. Not on OTC laxatives.   Has not followed up with [facility] yet.     ROS:    No fevers or chills  No weight loss  No blurry vision, double vision or change in vision  No sore throat  No lymphadenopathy  + chronic migraines   No shortness of breath or wheezing  No chest pain or pressure  No rashes or skin changes  + dysphagia (meats on occasion)  No melena  No dysuria, frequency or urgency  No hot/cold intolerance or polyria  + anxiety or depression    PROBLEM LIST  Patient Active Problem List    Diagnosis Date Noted     Chronic right shoulder pain 02/17/2021     Priority: Medium     Arthropathy of cervical facet joint 01/25/2021     Priority: Medium     Added automatically from request for surgery [id number]       ERRONEOUS ENCOUNTER--DISREGARD 12/22/2020     Priority: Medium     Cervical pain 09/29/2020     Priority: Medium     Chronic pain of both shoulders 09/29/2020     Priority: Medium     Cervical facet joint syndrome 09/29/2020     Priority: Medium     Added automatically from request for surgery [id number]       Facet arthropathy, cervical 09/02/2020     Priority: Medium     Added automatically from request for surgery [id number]       Hiatal hernia 02/10/2020     Priority: Medium     s/p hiatal hernia repair, nissen, PEG, 2/10 01/22/2020     Priority: Medium     Added automatically from request for surgery [id number]       POTS (postural orthostatic tachycardia syndrome) 10/23/2019     Priority: Medium     Autonomic dysfunction 06/07/2018     Priority: Medium     Low back pain of multiple sites of spine with sciatica 12/09/2017     Priority: Medium     Intractable chronic  migraine without aura and without status migrainosus 12/09/2017     Priority: Medium     Obese 12/09/2017     Priority: Medium     Urinary urgency 08/30/2017     Priority: Medium     Nocturia 08/30/2017     Priority: Medium     Cervicalgia 08/25/2017     Priority: Medium     Headache 07/25/2017     Priority: Medium     Postlaminectomy syndrome, lumbar region 06/14/2017     Priority: Medium     Lymphadenopathy of left cervical region 03/27/2017     Priority: Medium     Easy bruising 10/13/2016     Priority: Medium     Head and neck lymphadenopathy 10/13/2016     Priority: Medium     Hepatomegaly 10/13/2016     Priority: Medium     Liver lesion 10/13/2016     Priority: Medium     Chronic left shoulder pain 09/05/2016     Priority: Medium     s/p TLIF L4-5,L5-S1 with solid arthrodesis 09/05/2016     Priority: Medium     Tom-Danlos syndrome 09/05/2016     Priority: Medium     Acquired hypothyroidism 07/22/2016     Priority: Medium     Anxiety 07/22/2016     Priority: Medium     Opioid dependence (H) 07/22/2016     Priority: Medium     Overview:   Treatment Agreement       Essential hypertension with goal blood pressure less than 140/90 07/22/2016     Priority: Medium     Low back pain 07/12/2016     Priority: Medium     Lumbar radiculopathy 02/18/2016     Priority: Medium     Encounter for genetic counseling and testing 01/05/2016     Priority: Medium     Overview:     [facility] Aortopathy Comprehensive Panel ordered 1/5/2016.       Sinus tachycardia 09/29/2015     Priority: Medium     Astigmatism 12/12/2014     Priority: Medium     Keratoconjunctivitis sicca (H) 12/12/2014     Priority: Medium     Presbyopia 12/12/2014     Priority: Medium     Opioid type dependence, continuous (H) 10/09/2014     Priority: Medium     Other acute postoperative pain 10/09/2014     Priority: Medium     Anxiety disorder 08/21/2014     Priority: Medium     Pain disorder associated with psychological and physical factors 08/21/2014      Priority: Medium     Low back pain radiating to both legs 07/08/2014     Priority: Medium     Arthritis of facet joints at multiple vertebral levels 01/01/2014     Priority: Medium     Spondylolisthesis of lumbar region 12/10/2013     Priority: Medium     Lumbar degenerative disc disease 11/21/2013     Priority: Medium     DNS (deviated nasal septum) 10/09/2012     Priority: Medium     Nasal turbinate hypertrophy 10/09/2012     Priority: Medium     Dysuria 07/12/2011     Priority: Medium     Chronic sinusitis with recurrent bronchitis 02/01/2007     Priority: Medium     Allergic rhinitis 01/09/2007     Priority: Medium     Cyst of fallopian tube 12/28/1996     Priority: Medium     Arthritis involving multiple sites 01/01/1986     Priority: Medium     Uterine endometriosis 01/01/1986     Priority: Medium       PERTINENT PAST MEDICAL HISTORY:  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     Cervicalgia      Chronic sinusitis 00/2007    Diagnosed with chronic pansinusitis 2016     Coagulation disorder (H)     factor 8     CSF leak     Chiari malformation     Depression      Tom-Danlos disease      Eosinophilic esophagitis 08/2018     Gastroesophageal reflux disease 3/1/2017    I have large hiatal hernia     Hiatal hernia 2016    Have adeline hiatel hernia     Hoarseness Unsure    It comes and goes     Hypertension      Hypothyroid      IBS (irritable bowel syndrome) with constipation     improved on Linzess     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     Irregular heart beat      Migraines 1/1/2007    Unsure of exact date     Nasal polyps 2013    Were revoved 03/2017, benign     Orthostatic  hypotension      Other nervous system complications 1/2014    Autonomic nervous system disorder, neuralgia, neuropathy     Swelling, mass, or lump in head and neck 08/2016    Lymph node has been growing for last year     Thyroid disease 01/01/2008     Urinary incontinence      Urinary urgency    Was told she had an ulcer at age 15 in the setting of life stress. Not sure if she had an EGD at that time.       PREVIOUS SURGERIES:  Past Surgical History:   Procedure Laterality Date     AS REPAIR OF NASAL SEPTUM  2009    repair broke nose     BACK SURGERY  12/09/2013  10/01/2014    Spinal fusion L4-S1, L5 moving 5/8ths in. Remove screws/rods     BIOPSY  01/01/2008 & 3/2017    mole biopsy, lymph node biopsy     COLONOSCOPY  3/2017    Colonoscopy and GI     ESOPHAGEAL IMPEDENCE FUNCTION TEST WITH 24 HOUR PH GREATER THAN 1 HOUR N/A 9/17/2019    Procedure: IMPEDANCE PH STUDY, ESOPHAGUS, 24 HOUR;  Surgeon: Raghavendra Grande MD;  Location:  GI     ESOPHAGOSCOPY, GASTROSCOPY, DUODENOSCOPY (EGD), COMBINED N/A 8/3/2018    Procedure: COMBINED ESOPHAGOSCOPY, GASTROSCOPY, DUODENOSCOPY (EGD), BIOPSY SINGLE OR MULTIPLE;;  Surgeon: Juan Woodson MD;  Location: [facility]     ESOPHAGOSCOPY, GASTROSCOPY, DUODENOSCOPY (EGD), COMBINED N/A 10/22/2018    Procedure: COMBINED ESOPHAGOSCOPY, GASTROSCOPY, DUODENOSCOPY (EGD), BIOPSY SINGLE OR MULTIPLE;  Surgeon: Sadia Carolina MD;  [facility]     ESOPHAGOSCOPY, GASTROSCOPY, DUODENOSCOPY (EGD), COMBINED N/A 12/17/2018    Procedure: COMBINED ESOPHAGOSCOPY, GASTROSCOPY, DUODENOSCOPY (EGD);  Surgeon: Juan Woodson MD;  Location: [facility] GI     INJECT BLOCK MEDIAL BRANCH CERVICAL/THORACIC/LUMBAR Left 9/24/2020    Procedure: BLOCK, NERVE, FACET JOINT, MEDIAL BRANCH, DIAGNOSTIC;  Surgeon: Faiza Martinez MD;  [facility]     INJECT BLOCK MEDIAL BRANCH CERVICAL/THORACIC/LUMBAR Bilateral 10/8/2020    Procedure: Bilateral cervical 3, cervical 4, and cervical 5 medial branch nerve block;   Surgeon: Faiza Martinez MD;  Location: [facility] OR     INJECT BLOCK MEDIAL BRANCH CERVICAL/THORACIC/LUMBAR Right 3/4/2021    Procedure: Cervical 3, cervical 4, cervical 5 medial branch nerve blocks to target C3-C4 and C4-C5 facet joint;  Surgeon: Faiza Martinez MD;  Location: [facility] OR     LAPAROSCOPIC HERNIORRHAPHY HIATAL N/A 2/10/2020    Procedure: LAPAROSCOPIC HIATAL HERNIA REPAIR, NISSEN FUNDOPLICATION, ESOPHOGASTRODUODENOSCOPY, PEG TUBE PLACEMENT.;  Surgeon: Alana Mack MD;  Location: [facility]     NASAL/SINUS POLYPECTOMY  2017    Polyps were benign     NOSE SURGERY  2007    deviated septum     TONSILLECTOMY & ADENOIDECTOMY  1981     TUBAL LIGATION  07/01/2011       PREVIOUS ENDOSCOPY:  EGD 8/2017: findings s/f EoE, 3 cm HH. Path showed 40 eos/HPF in distal and mid esophagus. Mild acute inflammation and occasional eos.   EGD 3/2017: EGD with ringed esophagus with distal bx showing increased eos, min eos in mid esophagus.   cscope 3/2017: small-mouthed, mild diverticulosis, 1 small hyperplastic polyp. Recommended to repeat cscope in 10 years (given path).     ALLERGIES:     Allergies   Allergen Reactions     Bee Venom Shortness Of Breath, Palpitations, Anaphylaxis and Difficulty breathing     Patient does carry Epi-Pen     Chicken-Derived Products (Egg) Nausea and Diarrhea     Compazine [Prochlorperazine]      Extreme jittery     Food      Milk     Gabapentin      Dizzy     Gluten Meal GI Disturbance     Wheat bran and wheat     Pregabalin      Seasonal Allergies      Dust, mold     Topiramate      Pt does not remember reaction       PERTINENT MEDICATIONS:    Current Outpatient Medications:      albuterol (PROAIR HFA/PROVENTIL HFA/VENTOLIN HFA) 108 (90 BASE) MCG/ACT Inhaler, Inhale 2 puffs into the lungs every 6 hours as needed , Disp: , Rfl:      amitriptyline (ELAVIL) 10 MG tablet, Take 3 tablets (30 mg) by mouth At Bedtime, Disp: 90 tablet, Rfl: 1     buprenorphine (BUTRANS) 10 MCG/HR WK patch, APPLY 1 PATCH ONCE  A WEEK, Disp: 4 patch, Rfl: 2     cetirizine (ZYRTEC) 10 MG tablet, Take 10 mg by mouth daily as needed , Disp: , Rfl:      diclofenac (VOLTAREN) 1 % topical gel, Place 2 g onto the skin 4 times daily, Disp: 1 Tube, Rfl: 0     DULoxetine (CYMBALTA) 30 MG capsule, Take 60mg in the morning and 30mg at bedtime, Disp: 270 capsule, Rfl: 3     EPINEPHrine (EPIPEN/ADRENACLICK/OR ANY BX GENERIC EQUIV) 0.3 MG/0.3ML injection 2-pack, INJECT 0.3 MLS (0.3 MG) INTO THE MUSCLE AS NEEDED FOR ANAPHYLAXIS, Disp: , Rfl: 1     famotidine (PEPCID) 20 MG tablet, Take 1 tablet (20 mg) by mouth At Bedtime, Disp: 90 tablet, Rfl: 3     ferrous fumarate 65 mg, [place]. FE,-Vitamin C 125 mg (VITRON-C)  MG TABS tablet, Take 1 tablet by mouth every other day, Disp: 60 tablet, Rfl: 1     fluticasone (FLONASE) 50 MCG/ACT nasal spray, Spray 2 sprays into both nostrils daily, Disp: 48 mL, Rfl: 0     ivabradine (CORLANOR) 7.5 MG tablet, Take 1 tablet (7.5 mg) by mouth 2 times daily (with meals), Disp: 180 tablet, Rfl: 3     levothyroxine (SYNTHROID/LEVOTHROID) 50 MCG tablet, Take 1 tablet (50 mcg) by mouth daily Repeat labs in 2 months, Disp: 90 tablet, Rfl: 1     Lidocaine (LIDOCARE) 4 % Patch, Place 1-3 patches onto the skin every 24 hours To prevent lidocaine toxicity, patient should be patch free for 12 hrs daily., Disp: 20 patch, Rfl: 0     linaclotide (LINZESS) 145 MCG capsule, Take 1 capsule (145 mcg) by mouth every morning (before breakfast), Disp: 90 capsule, Rfl: 3     medical cannabis (Patient's own supply.  Not a prescription), Take 1 Dose by mouth See Admin Instructions (This is NOT a prescription, and does not certify that the patient has a qualifying medical condition for medical cannabis.  The purpose of this order is  to document that the patient reports taking medical cannabis.)   Capsule formulation - uses as needed (currently out of this and not using as of January 28, 2020), Disp: , Rfl:      metoclopramide (REGLAN) 10 MG  tablet, Take 1 tablet (10 mg) by mouth 4 times daily as needed (headache), Disp: 40 tablet, Rfl: 3     Multiple Vitamins-Minerals (MULTIVITAMIN PO), Take 1 tablet by mouth daily , Disp: , Rfl:      naloxone (NARCAN) 4 MG/0.1ML nasal spray, Spray 1 spray (4 mg) into one nostril alternating nostrils once as needed for opioid reversal every 2-3 minutes until assistance arrives, Disp: 0.2 mL, Rfl: 0     naproxen sodium (ANAPROX) 220 MG tablet, Take 440-660 mg by mouth daily as needed for moderate pain, Disp: , Rfl:      omeprazole (PRILOSEC) 40 MG DR capsule, Take 1 capsule (40 mg) by mouth 2 times daily as needed (prn), Disp: 180 capsule, Rfl: 3     ondansetron (ZOFRAN-ODT) 4 MG ODT tab, Take 1-2 tablets (4-8 mg) by mouth every 12 hours as needed for nausea, Disp: 60 tablet, Rfl: 1     pantoprazole (PROTONIX) 40 MG EC tablet, Take 1 tablet (40 mg) by mouth daily, Disp: 90 tablet, Rfl: 3     Probiotic Product (PROBIOTIC DAILY PO), Take 2 packets by mouth every morning , Disp: , Rfl:      propranolol ER (INDERAL LA) 120 MG 24 hr capsule, Take 1 capsule (120 mg) by mouth daily, Disp: 30 capsule, Rfl: 11     rizatriptan (MAXALT-MLT) 5 MG ODT, TAKE 1-2 TABLETS (5-10 MG) BY MOUTH AT ONSET OF HEADACHE FOR MIGRAINE (MAX 30 MG IN 24 HOURS), Disp: 18 tablet, Rfl: 11     SUMAtriptan (IMITREX STATDOSE) 6 MG/0.5ML pen injector kit, Inject 0.5 ml (6 mg) subcutaneously at onset of migraine, May repeat in 1 hour , Max 12 mg/24 hrs, Disp: 2 kit, Rfl: 11     tiZANidine (ZANAFLEX) 4 MG tablet, Take 1 tablet (4 mg) by mouth 4 times daily as needed for muscle spasms, Disp: 360 tablet, Rfl: 5     traMADol (ULTRAM) 50 MG tablet, TAKE 1 TABLET (50 MG) BY MOUTH EVERY 6 HOURS AS NEEDED FOR SEVERE PAIN, Disp: 60 tablet, Rfl: 2     vitamin B complex with vitamin C (VITAMIN  B COMPLEX) TABS tablet, Take 1 tablet by mouth daily, Disp: , Rfl:      VITAMIN D, CHOLECALCIFEROL, PO, Take 2,000 Units by mouth daily, Disp: , Rfl:      meloxicam (MOBIC)  7.5 MG tablet, TAKE 1 TABLET (7.5 MG) BY MOUTH DAILY FOR 14 DAYS, Disp: 14 tablet, Rfl: 0    SOCIAL HISTORY:  Social History     Socioeconomic History     Marital status:      Spouse name: Carry     Number of children: 5     Years of education: Not on file     Highest education level: Not on file   Occupational History     Not on file   Social Needs     Financial resource strain: Not on file     Food insecurity     Worry: Not on file     Inability: Not on file     Transportation needs     Medical: Not on file     Non-medical: Not on file   Tobacco Use     Smoking status: Former Smoker     Packs/day: 0.20     Years: 10.00     Pack years: 2.00     Types: Cigarettes     Start date: 1991     Quit date: 2013     Years since quittin.3     Smokeless tobacco: Never Used   Substance and Sexual Activity     Alcohol use: Yes     Comment: once a month     Drug use: Yes     Types: Marijuana     Comment: medical marijuana     Sexual activity: Yes     Partners: Male     Birth control/protection: Female Surgical   Lifestyle     Physical activity     Days per week: Not on file     Minutes per session: Not on file     Stress: Not on file   Relationships     Social connections     Talks on phone: Not on file     Gets together: Not on file     Attends Judaism service: Not on file     Active member of club or organization: Not on file     Attends meetings of clubs or organizations: Not on file     Relationship status: Not on file     Intimate partner violence     Fear of current or ex partner: Not on file     Emotionally abused: Not on file     Physically abused: Not on file     Forced sexual activity: Not on file   Other Topics Concern     Parent/sibling w/ CABG, MI or angioplasty before 65F 55M? No   Social History Narrative    5 kids: 26 son, 25 daughter, 21 daughter, 15 Leslie, 9 Ibeth yo     works at [employer] for disability        3 yo and 6 months grandchildren Marcelo and Jennifer  "      FAMILY HISTORY:  Family History   Problem Relation Age of Onset     Connective Tissue Disorder Mother         eds     Connective Tissue Disorder Sister         eds     Cancer Father         Spinal tumor grew into spinal cord, went in brain     Migraines Father      Diabetes Maternal Grandmother         Elderly diabetes     Heart Disease Maternal Grandmother      Hypertension Maternal Grandmother      Diabetes Paternal Grandmother         Elderly diabetes     Diabetes Paternal Grandfather         Elderly diabetes     Asthma Sister         Pediatric Asthma     Migraines Sister      Asthma Son         Pediatric Asthma     Thyroid Disease Sister         ,     Migraines Sister      Migraines Sister      Breast Cancer No family hx of        Past/family/social history reviewed and no changes    PHYSICAL EXAMINATION:  Constitutional: aaox3, cooperative, pleasant, not dyspneic/diaphoretic, no acute distress  Vitals reviewed: Ht 1.702 m (5' 7\")   Wt 77.1 kg (170 lb)   BMI 26.63 kg/m    Wt:   Wt Readings from Last 2 Encounters:   04/21/21 77.1 kg (170 lb)   03/04/21 78 kg (172 lb)        General appearance  Healthy appearing adult, in no acute distress     Eyes  Sclera anicteric  Pupils round and reactive to light     Ears, nose, mouth and throat  No obvious external lesions of ears and nose  Hearing intact     Neck  Symmetric  No obvious external lesions     Respiratory  Normal respiration, no use of accessory muscles      MSK  Gait normal     Skin  No rashes or jaundice      Psychiatric  Oriented to person, place and time  Appropriate mood and affect.     PERTINENT STUDIES:  Labs 11/2020 normal   GES 10/2017: rapid gastric emptying  Video swallow: mod HH (mixed type), deep penetration without aspiration.     Again, thank you for allowing me to participate in the care of your patient.      Sincerely,    Juana Griffith MD    "

## 2021-04-21 NOTE — NURSING NOTE
"Chief Complaint   Patient presents with     Follow Up       Vitals:    04/21/21 1506   Weight: 77.1 kg (170 lb)   Height: 1.702 m (5' 7\")       Body mass index is 26.63 kg/m .    Erika Tijerina CMA    "

## 2021-04-21 NOTE — CONFIDENTIAL NOTE
Discussed alternative pharmacy w/ patient on 3/21. Paper RX given for [facility].    Jeri Bravo, BSN, RN, PHN  Electrophysiology Nurse Coordinator

## 2021-04-24 ENCOUNTER — HEALTH MAINTENANCE LETTER (OUTPATIENT)
Age: 51
End: 2021-04-24

## 2021-04-29 ENCOUNTER — VIRTUAL VISIT (OUTPATIENT)
Dept: ANESTHESIOLOGY | Facility: CLINIC | Age: 51
End: 2021-04-29
Payer: COMMERCIAL

## 2021-04-29 ENCOUNTER — MYC MEDICAL ADVICE (OUTPATIENT)
Dept: ANESTHESIOLOGY | Facility: CLINIC | Age: 51
End: 2021-04-29

## 2021-04-29 DIAGNOSIS — M96.1 POSTLAMINECTOMY SYNDROME: ICD-10-CM

## 2021-04-29 DIAGNOSIS — M47.812 ARTHROPATHY OF CERVICAL FACET JOINT: Primary | ICD-10-CM

## 2021-04-29 DIAGNOSIS — M79.2 NEUROPATHIC PAIN: ICD-10-CM

## 2021-04-29 DIAGNOSIS — G89.4 CHRONIC PAIN SYNDROME: ICD-10-CM

## 2021-04-29 PROCEDURE — 99213 OFFICE O/P EST LOW 20 MIN: CPT | Mod: 95 | Performed by: ANESTHESIOLOGY

## 2021-04-29 RX ORDER — BUPRENORPHINE 10 UG/H
PATCH TRANSDERMAL
Qty: 4 PATCH | Refills: 2 | Status: SHIPPED | OUTPATIENT
Start: 2021-04-29 | End: 2021-09-03

## 2021-04-29 RX ORDER — TRAMADOL HYDROCHLORIDE 50 MG/1
50 TABLET ORAL EVERY 6 HOURS PRN
Qty: 60 TABLET | Refills: 2 | Status: SHIPPED | OUTPATIENT
Start: 2021-04-29 | End: 2021-07-30

## 2021-04-29 RX ORDER — AMITRIPTYLINE HYDROCHLORIDE 10 MG/1
30 TABLET ORAL AT BEDTIME
Qty: 90 TABLET | Refills: 3 | Status: SHIPPED | OUTPATIENT
Start: 2021-04-29 | End: 2021-09-16

## 2021-04-29 ASSESSMENT — PAIN SCALES - GENERAL: PAINLEVEL: SEVERE PAIN (6)

## 2021-04-29 NOTE — PROGRESS NOTES
Video call duration 16 minutes    Ada Welch is a 50 year old female who  is well known to me for multifocal chronic pain. The patient has  complex history of low back pain for ~25 years for which she underwent L4-S1 fusion (TLIF) 12/9/13.  Postoperatively, her back pain worsened (mainly LBP, but also radiated down both lower limbs in an S1 dermatomal distribution).   Underwent removal of posterior instrumentation (screws and guadalupe).  After fusion removal, her pain continued.  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  She even sought care from a EDS specialist in [place].  She is currently on disability.     She has a h/o significant autonomic dysfunction.  Since 2014, she reports that her heart rate has fluctuated from as low as  beats per minutes and her blood pressure also fluctuates at the same time without any aggravating factors.  The fluctuation of the blood pressure and heart rate happens when she is supine, sitting, standing, walking, etc.  She has seen cardiologist Dr. Russell who advised her to continue fludrocortisone, isometric exercise, low carbohydrate and low gluten diet.     She has been seeing Dr. Singh for urinary urgency and incontinence.  She is advised for improving lifestyle, dietary modification, optimizing bowel regimen and to start pelvic physical therapy.     She has seen Dr. Moncada for left upper extremity radicular pain along the C8 nerve root distribution.  Per Dr. Moncada's note, she does not have a surgical target which may explain her symptoms.  She still continues to report this symptom.  We discussed about epidural steroid injection, however she is reluctant to perform the procedures and states she previously had a bad reaction with a steroid.   Patient also had cervical medial branch nerve block x2 with us 50% pain relief.    She will proceed to cervical medial branch nerve radiofrequency ablation.     The patient has seen Dr. Mack for eosinophilic esophagitis,  GERD, and dysphagia.    She had open hernia surgery by Dr. Mack in February 2020.  Her postoperative course was uneventful.        The patient has been seeing Dr. Noemy Nguyen for chronic migraines. Patient reports that her chronic intractable headache pain recently got worse. 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  She has undergone blood patches intermittently for her headache pain.       Currently she has been  managing her pain with stable dose of Butrans 10 mcg per hour q 7 days, tramadol 50 mg every 6 hours as needed which she refills every 2-3 months.  In addition she takes tizanidine 4 mg 4 times daily as needed.    She takes sumatriptan for breakthrough migraine.  Occasionally she takes oxycodone  when her pain gets significantly worse.         Diagnosis  Postlaminectomy pain syndrome  Intractable headache syndrome  Cervicalgia  Cervical facet arthritis  Lumbar degenerative disc disease        Assessment and plan    Continue Butrans, tramadol    Meloxicam 7.5 mg daily for 2 weeks    Continue physical therapy of the right shoulder    Schedule  cervical medial branch nerve radiofrequency ablation under sedation. Risks of the procedure including bleeding, infection, increased pain, failed procedure, nerve injury, discussed with the patient.  All questions answered      Faiza Martinez MD, PhD    [facility] [address]  5TH FLOOR  [facility] [id number] [phone]  Dept: [phone]

## 2021-04-29 NOTE — PATIENT INSTRUCTIONS
Medications:    amitriptyline (ELAVIL) 10 MG tablet. Take 3 tablets (30 mg) by mouth At Bedtime.    buprenorphine (BUTRANS) 10 MCG/HR WK patch. APPLY 1 PATCH ONCE A WEEK.     traMADol (ULTRAM) 50 MG tablet. Take 1 tablet (50 mg) by mouth every 6 hours as needed for severe pain.       *Please provide the clinic with a minium of 1 week notice, on all prescription refills.       Procedures:      Schedule Cervical RFA    Call to schedule your procedure: [phone], option #2    Your pre-procedure instructions are below, please call our clinic if you have any questions.      Recommended Follow up:      Follow up 4-6 weeks after procedure.        Please call [phone], option #1 to schedule your clinic appointment if you don't already have an appointment scheduled.      Procedure Information related to COVID-19    Please call [phone] option #2 to schedule, reschedule, or cancel your procedure appointment.   Phones are answered Monday - Friday from 08:00 - 4:30pm.  Leave a voicemail with your name, birth date, and phone number if no one is available to take your call.     You will need to be tested for COVID-19 within 4 days (96 hours) of your procedure.  You will be called to schedule your COVID test by a central scheduling team. If you have not been contacted to schedule your test within 4 days of the scheduled procedure, call [phone]    Please be aware that the turn around time for the test is approximately 24-72 hours.   If your results are still pending the day of your procedure, you will be notified as soon as possible as the procedure may be cancelled.    Please note: You will only be contacted for positive and pending results.     The procedure center staff will call you several days before the procedure to review important information that you will need to know for the day of the procedure.   Please contact the clinic if you have further questions about this information.       Information related to  Scheduling and Pre-Procedure Instructions:      If you are having a Diagnostic procedure, you will be given a Pain Diary to document your pain relief.   Please fax the completed form to our office.   fax number is [phone]    If you must reschedule your procedure more than two times, you must follow up in clinic before rescheduling again.        Preparing for your procedure    CAUTION - FAILURE TO FOLLOW THESE PRE-PROCEDURE INSTRUCTIONS WILL RESULT IN YOUR PROCEDURE BEING RESCHEDULED.      Your procedure: Cervical RFA      Pregnancy  If you are pregnant, or think you may be pregnant, please notify our staff. This may or may not affect the ability to perform the procedure.       You must have a  take you home after your procedure. Transportation by taxi or para-transit is okay as long as you have a responsible adult accompany you. You must provide your 's full name and contact number at time of check in.     Fasting Protocol You may have NOTHING SOLID TO EAT 8 HOURS prior to arrival at the procedure area.      You may have CLEAR LIQUIDS UP TO 2 HOURS prior to arrival    Broth and candy are considered solid food and require an eight hour fast.     Clear liquids include water, clear fruit juice (no pulp), carbonated beverages, ice, black coffee, black tea, clear jello. No alcohol containing beverages.   Medications If you take any medications, DO NOT STOP. Take your medications as usual the day of your procedure with a sip of water AT LEAST 2 HOURS PRIOR TO ARRIVAL.    Antibiotics If you are currently taking antibiotics, you must complete the entire dose 7 days prior to your scheduled procedure. You must be clear of any signs or symptoms of infection. If you begin antibiotics, please contact our clinic for instructions.     Fever, Chills, or Rash If you experience a fever of higher than 100 degrees, chills, rash, or open wounds during the one week before your procedure, please call the clinic to  see if you may proceed with your procedure.      Medication Hold List  **Patients under Cardiology/Neurology care should consult their provider prior to the pain procedure to verify pre-procedure medication instructions. The information below contains general guidelines.**      Blood Thinners If you are taking daily ASPIRIN, PLAVIX, OR OTHER BLOOD THINNERS SUCH AS COUMADIN/WARFARIN, we will need your prescribing doctor to sign a release permitting you to stop these medications. Once approved by your prescribing doctor - STOP ALL BLOOD THINNERS BASED ON THE TIME TABLE BELOW PRIOR TO YOUR PROCEDURE. If you have been instructed to stop WARFARIN(COUMADIN), you must have an INR lab drawn the day before your procedure. . Your INR must be within normal limits before we can perform your injection. MEDICATIONS CAN BE RESTARTED AFTER YOUR PROCEDURE.    14 DAY HOLD  Ticlid (ticlopidine)    10 DAY HOLD  Effient (Prasugel)    3 DAY HOLD  Xarelto (rivaroxaban) 7 DAY HOLD  Anacin, Bufferin, Ecotrin, Excedrin, Aggrenox (Aspirin)  Brilinta (ticagrelor)  Coumadin (Warfarin)  Pradexa (Dabigatran)  Elmiron (Pentosan)  Plavix (Clopidogrel Bisulfate)  Pletal (Cilostazol)    24 HOUR HOLD  Lovenox (enoxaparin)  Agrylin (Anagrelide)        Non-steroidal Anti-inflammatories (NSAIDs) DO NOT TAKE any non-steroidal anti-inflammatory medications (NSAIDs) listed on the table below. MEDICATIONS CAN BE RESTARTED AFTER YOUR PROCEDURE. Celebrex is OK to take and does not need to be discontinued.     Medications to stop:  3 DAY HOLD  Advil, Motrin (Ibuprofen)  Arthrotec (diciofenac sodium/misoprostol)  Clinoril (Sulindac)  Indocin (Indomethacin)  Lodine (Etodolac)  Toradol (Ketorolac)  Vicoprofen (Hydrocodone and Ibuprofen)  Voltaren (Diclotenac)    14 DAY HOLD  Daypro (Oxaprozin)  Feldene (Piroxicam)   7 DAY HOLD  Aleve (Naproxen sodium)  Darvon compound (contains aspirin)  Naprosyn (Naproxen)  Norgesic Forte (contains aspirin)  Mobic  (Meloxicam)  Oruvall (Ketoprofen)  Percodan (contains aspirin)  Relafen (Nabumetone)  Salsalate  Trilisate  Vitamin E (more than 400 mg per day)  Any medication containing aspirin                To speak with a nurse, schedule/reschedule/cancel a clinic appointment, or request a medication refill call: [phone]     You can also reach us by [employer]: [email or web address]

## 2021-04-29 NOTE — PROGRESS NOTES
Ada is a 50 year old who is being evaluated via a billable video visit.      How would you like to obtain your AVS? MyChart  If the video visit is dropped, the invitation should be resent by: Text to cell phone: [phone]  Will anyone else be joining your video visit? Aicha Connors CMA

## 2021-04-29 NOTE — LETTER
4/29/2021       RE: Ada Welch  [address]  [place] [address]     Dear Colleague,    Thank you for referring your patient, Ada Welch, to the [facility] [place] at [facility]. Please see a copy of my visit note below.    Ada is a 50 year old who is being evaluated via a billable video visit.      How would you like to obtain your AVS? MyChart  If the video visit is dropped, the invitation should be resent by: Text to cell phone: [phone]  Will anyone else be joining your video visit? Aicha Connors CMA      Video call duration 16 minutes    Ada Welch is a 50 year old female who  is well known to me for multifocal chronic pain. The patient has  complex history of low back pain for ~25 years for which she underwent L4-S1 fusion (TLIF) 12/9/13.  Postoperatively, her back pain worsened (mainly LBP, but also radiated down both lower limbs in an S1 dermatomal distribution).   Underwent removal of posterior instrumentation (screws and guadalupe).  After fusion removal, her pain continued.  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  She even sought care from a EDS specialist in [place].  She is currently on disability.     She has a h/o significant autonomic dysfunction.  Since 2014, she reports that her heart rate has fluctuated from as low as  beats per minutes and her blood pressure also fluctuates at the same time without any aggravating factors.  The fluctuation of the blood pressure and heart rate happens when she is supine, sitting, standing, walking, etc.  She has seen cardiologist Dr. Russell who advised her to continue fludrocortisone, isometric exercise, low carbohydrate and low gluten diet.     She has been seeing Dr. Singh for urinary urgency and incontinence.  She is advised for improving lifestyle, dietary modification, optimizing bowel regimen and to start  pelvic physical therapy.     She has seen Dr. Moncada for left upper extremity radicular pain along the C8 nerve root distribution.  Per Dr. Moncada's note, she does not have a surgical target which may explain her symptoms.  She still continues to report this symptom.  We discussed about epidural steroid injection, however she is reluctant to perform the procedures and states she previously had a bad reaction with a steroid.   Patient also had cervical medial branch nerve block x2 with us 50% pain relief.    She will proceed to cervical medial branch nerve radiofrequency ablation.     The patient has seen Dr. Mack for eosinophilic esophagitis, GERD, and dysphagia.    She had open hernia surgery by Dr. Mack in February 2020.  Her postoperative course was uneventful.        The patient has been seeing Dr. Noemy Nguyen for chronic migraines. Patient reports that her chronic intractable headache pain recently got worse. 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  She has undergone blood patches intermittently for her headache pain.       Currently she has been  managing her pain with stable dose of Butrans 10 mcg per hour q 7 days, tramadol 50 mg every 6 hours as needed which she refills every 2-3 months.  In addition she takes tizanidine 4 mg 4 times daily as needed.    She takes sumatriptan for breakthrough migraine.  Occasionally she takes oxycodone  when her pain gets significantly worse.      Diagnosis  Postlaminectomy pain syndrome  Intractable headache syndrome  Cervicalgia  Cervical facet arthritis  Lumbar degenerative disc disease     Assessment and plan    Continue Butrans, tramadol    Meloxicam 7.5 mg daily for 2 weeks    Continue physical therapy of the right shoulder    Schedule  cervical medial branch nerve radiofrequency ablation under sedation. Risks of the procedure including bleeding, infection, increased pain, failed procedure, nerve injury, discussed with the patient.  All questions  answered    Faiza Martinez MD, PhD    [facility] [address]  5TH FLOOR  [facility] [id number] [phone]  Dept: [phone]

## 2021-04-29 NOTE — NURSING NOTE
RN called and reviewed AVS with patient. Patient to contact clinic if any questions/concerns. Patient verbalized understanding. AVS sent via [employer] for review.    Skye Jernigan RN

## 2021-04-30 ENCOUNTER — HOSPITAL ENCOUNTER (OUTPATIENT)
Facility: AMBULATORY SURGERY CENTER | Age: 51
End: 2021-04-30
Attending: ANESTHESIOLOGY
Payer: COMMERCIAL

## 2021-05-16 DIAGNOSIS — Z11.59 ENCOUNTER FOR SCREENING FOR OTHER VIRAL DISEASES: Primary | ICD-10-CM

## 2021-05-16 DIAGNOSIS — J31.0 CHRONIC RHINITIS: ICD-10-CM

## 2021-05-18 NOTE — TELEPHONE ENCOUNTER
" fluticasone (FLONASE) 50 MCG/ACT nasal spray    Last Written Prescription Date:  2/22/21  Last Fill Quantity: 48ml,   # refills: 0  Last Office Visit : 1/28/20  Future Office visit:  None     \" Return if symptoms worsen or fail to improve.\"    Scheduling has been notified to contact the pt for appointment.    Routing refill request to provider for review/approval because:  lv 1/20. rf?  "

## 2021-05-20 ENCOUNTER — VIRTUAL VISIT (OUTPATIENT)
Dept: CARDIOLOGY | Facility: CLINIC | Age: 51
End: 2021-05-20
Attending: INTERNAL MEDICINE
Payer: COMMERCIAL

## 2021-05-20 DIAGNOSIS — G90.A POSTURAL ORTHOSTATIC TACHYCARDIA SYNDROME: Primary | ICD-10-CM

## 2021-05-20 DIAGNOSIS — G90.9 AUTONOMIC DYSFUNCTION: ICD-10-CM

## 2021-05-20 PROCEDURE — 99213 OFFICE O/P EST LOW 20 MIN: CPT | Mod: 95 | Performed by: INTERNAL MEDICINE

## 2021-05-20 RX ORDER — FLUTICASONE PROPIONATE 50 MCG
2 SPRAY, SUSPENSION (ML) NASAL DAILY
Qty: 48 ML | Refills: 4 | Status: SHIPPED | OUTPATIENT
Start: 2021-05-20 | End: 2022-06-18

## 2021-05-20 NOTE — PROGRESS NOTES
HPI:     Ms. Scar Welch is a 49 year-old female who presents  for follow up of autonomic dysfuction.  The patient has a past medical history significant for dysautonomia and Tom-Danlos.     Ada is currently taking propranolol  qday as well as ivabradine 7.5 mg twice daily but insurance had initially been declining ivabradine but she just heard [employer] will cover the drug.   Ada notes that she can definitely tell a difference in her heart rate when not taking ivabradine.    She will also need a prescription for propranolol.    Patient is not noting any additional or new cardiac symptoms.  We agreed to revisit in a few months after she restarts ivabradine dosing      PAST MEDICAL HISTORY:  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     Cervicalgia      Chronic sinusitis 00/2007    Diagnosed with chronic pansinusitis 2016     Coagulation disorder (H)     factor 8     CSF leak     Chiari malformation     Depression      Tom-Danlos disease      Eosinophilic esophagitis 08/2018     Gastroesophageal reflux disease 3/1/2017    I have large hiatal hernia     Hiatal hernia 2016    Have adeline hiatel hernia     Hoarseness Unsure    It comes and goes     Hypertension      Hypothyroid      IBS (irritable bowel syndrome) with constipation     improved on Linzess     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     Irregular heart beat      Migraines 1/1/2007    Unsure of exact date     Nasal polyps 2013    Were revoved 03/2017, benign     Orthostatic hypotension      Other nervous system complications 1/2014    Autonomic nervous system disorder, neuralgia, neuropathy     Swelling,  mass, or lump in head and neck 08/2016    Lymph node has been growing for last year     Thyroid disease 01/01/2008     Urinary incontinence      Urinary urgency        CURRENT MEDICATIONS:  Current Outpatient Medications   Medication Sig Dispense Refill     amitriptyline (ELAVIL) 10 MG tablet Take 3 tablets (30 mg) by mouth At Bedtime 90 tablet 3     buprenorphine (BUTRANS) 10 MCG/HR WK patch APPLY 1 PATCH ONCE A WEEK 4 patch 2     cetirizine (ZYRTEC) 10 MG tablet Take 10 mg by mouth daily as needed        DULoxetine (CYMBALTA) 30 MG capsule Take 60mg in the morning and 30mg at bedtime 270 capsule 3     EPINEPHrine (EPIPEN/ADRENACLICK/OR ANY BX GENERIC EQUIV) 0.3 MG/0.3ML injection 2-pack INJECT 0.3 MLS (0.3 MG) INTO THE MUSCLE AS NEEDED FOR ANAPHYLAXIS  1     famotidine (PEPCID) 20 MG tablet Take 1 tablet (20 mg) by mouth At Bedtime 90 tablet 3     ferrous fumarate 65 mg, [place]. FE,-Vitamin C 125 mg (VITRON-C)  MG TABS tablet Take 1 tablet by mouth every other day 60 tablet 1     fluticasone (FLONASE) 50 MCG/ACT nasal spray Spray 2 sprays into both nostrils daily Call clinic to schedule follow up appointment. 48 mL 4     levothyroxine (SYNTHROID/LEVOTHROID) 50 MCG tablet Take 1 tablet (50 mcg) by mouth daily Repeat labs in 2 months 90 tablet 1     Lidocaine (LIDOCARE) 4 % Patch Place 1-3 patches onto the skin every 24 hours To prevent lidocaine toxicity, patient should be patch free for 12 hrs daily. 20 patch 0     medical cannabis (Patient's own supply.  Not a prescription) Take 1 Dose by mouth See Admin Instructions (This is NOT a prescription, and does not certify that the patient has a qualifying medical condition for medical cannabis.  The purpose of this order is  to document that the patient reports taking medical cannabis.)     Capsule formulation - uses as needed (currently out of this and not using as of January 28, 2020)       metoclopramide (REGLAN) 10 MG tablet Take 1 tablet (10 mg) by mouth 4  times daily as needed (headache) 40 tablet 3     Multiple Vitamins-Minerals (MULTIVITAMIN PO) Take 1 tablet by mouth daily        naloxone (NARCAN) 4 MG/0.1ML nasal spray Spray 1 spray (4 mg) into one nostril alternating nostrils once as needed for opioid reversal every 2-3 minutes until assistance arrives 0.2 mL 0     naproxen sodium (ANAPROX) 220 MG tablet Take 440-660 mg by mouth daily as needed for moderate pain       omeprazole (PRILOSEC) 40 MG DR capsule Take 1 capsule (40 mg) by mouth 2 times daily as needed (prn) 180 capsule 3     ondansetron (ZOFRAN-ODT) 4 MG ODT tab Take 1-2 tablets (4-8 mg) by mouth every 12 hours as needed for nausea 60 tablet 1     Probiotic Product (PROBIOTIC DAILY PO) Take 2 packets by mouth every morning        propranolol ER (INDERAL LA) 120 MG 24 hr capsule Take 1 capsule (120 mg) by mouth daily 30 capsule 11     rizatriptan (MAXALT-MLT) 5 MG ODT TAKE 1-2 TABLETS (5-10 MG) BY MOUTH AT ONSET OF HEADACHE FOR MIGRAINE (MAX 30 MG IN 24 HOURS) 18 tablet 11     SUMAtriptan (IMITREX STATDOSE) 6 MG/0.5ML pen injector kit Inject 0.5 ml (6 mg) subcutaneously at onset of migraine, May repeat in 1 hour , Max 12 mg/24 hrs 2 kit 11     tiZANidine (ZANAFLEX) 4 MG tablet Take 1 tablet (4 mg) by mouth 4 times daily as needed for muscle spasms 360 tablet 5     traMADol (ULTRAM) 50 MG tablet Take 1 tablet (50 mg) by mouth every 6 hours as needed for severe pain 60 tablet 2     vitamin B complex with vitamin C (VITAMIN  B COMPLEX) TABS tablet Take 1 tablet by mouth daily       VITAMIN D, CHOLECALCIFEROL, PO Take 2,000 Units by mouth daily       albuterol (PROAIR HFA/PROVENTIL HFA/VENTOLIN HFA) 108 (90 BASE) MCG/ACT Inhaler Inhale 2 puffs into the lungs every 6 hours as needed        diclofenac (VOLTAREN) 1 % topical gel Place 2 g onto the skin 4 times daily (Patient not taking: Reported on 5/20/2021) 1 Tube 0     ivabradine (CORLANOR) 7.5 MG tablet Take 1 tablet (7.5 mg) by mouth 2 times daily (with  meals) (Patient not taking: Reported on 5/20/2021) 180 tablet 3     linaclotide (LINZESS) 145 MCG capsule Take 1 capsule (145 mcg) by mouth every morning (before breakfast) (Patient not taking: Reported on 5/20/2021) 90 capsule 3     meloxicam (MOBIC) 7.5 MG tablet TAKE 1 TABLET (7.5 MG) BY MOUTH DAILY FOR 14 DAYS 14 tablet 0     pantoprazole (PROTONIX) 40 MG EC tablet Take 1 tablet (40 mg) by mouth daily (Patient not taking: Reported on 5/20/2021) 90 tablet 3       PAST SURGICAL HISTORY:  Past Surgical History:   Procedure Laterality Date     AS REPAIR OF NASAL SEPTUM  2009    repair broke nose     BACK SURGERY  12/09/2013  10/01/2014    Spinal fusion L4-S1, L5 moving 5/8ths in. Remove screws/rods     BIOPSY  01/01/2008 & 3/2017    mole biopsy, lymph node biopsy     COLONOSCOPY  3/2017    Colonoscopy and GI     ESOPHAGEAL IMPEDENCE FUNCTION TEST WITH 24 HOUR PH GREATER THAN 1 HOUR N/A 9/17/2019    Procedure: IMPEDANCE PH STUDY, ESOPHAGUS, 24 HOUR;  Surgeon: Raghavendra Grande MD;  Location:  GI     ESOPHAGOSCOPY, GASTROSCOPY, DUODENOSCOPY (EGD), COMBINED N/A 8/3/2018    Procedure: COMBINED ESOPHAGOSCOPY, GASTROSCOPY, DUODENOSCOPY (EGD), BIOPSY SINGLE OR MULTIPLE;;  Surgeon: Juan Woodson MD;  Location:  GI     ESOPHAGOSCOPY, GASTROSCOPY, DUODENOSCOPY (EGD), COMBINED N/A 10/22/2018    Procedure: COMBINED ESOPHAGOSCOPY, GASTROSCOPY, DUODENOSCOPY (EGD), BIOPSY SINGLE OR MULTIPLE;  Surgeon: Sadia Carolina MD;  Location:  GI     ESOPHAGOSCOPY, GASTROSCOPY, DUODENOSCOPY (EGD), COMBINED N/A 12/17/2018    Procedure: COMBINED ESOPHAGOSCOPY, GASTROSCOPY, DUODENOSCOPY (EGD);  Surgeon: Juan Woodson MD;  Location: [facility] GI     INJECT BLOCK MEDIAL BRANCH CERVICAL/THORACIC/LUMBAR Left 9/24/2020    Procedure: BLOCK, NERVE, FACET JOINT, MEDIAL BRANCH, DIAGNOSTIC;  Surgeon: Faiza Martinez MD;  Location: [facility]     INJECT BLOCK MEDIAL BRANCH CERVICAL/THORACIC/LUMBAR Bilateral 10/8/2020    Procedure: Bilateral cervical 3,  cervical 4, and cervical 5 medial branch nerve block;  Surgeon: Faiza Martinez MD;  Location: [facility] OR     INJECT BLOCK MEDIAL BRANCH CERVICAL/THORACIC/LUMBAR Right 3/4/2021    Procedure: Cervical 3, cervical 4, cervical 5 medial branch nerve blocks to target C3-C4 and C4-C5 facet joint;  Surgeon: Faiza Martinez MD;  Location: [facility] OR     LAPAROSCOPIC HERNIORRHAPHY HIATAL N/A 2/10/2020    Procedure: LAPAROSCOPIC HIATAL HERNIA REPAIR, NISSEN FUNDOPLICATION, ESOPHOGASTRODUODENOSCOPY, PEG TUBE PLACEMENT.;  Surgeon: Alana Mack MD;  Location: [facility] OR     NASAL/SINUS POLYPECTOMY  2017    Polyps were benign     NOSE SURGERY  2007    deviated septum     TONSILLECTOMY & ADENOIDECTOMY  1981     TUBAL LIGATION  07/01/2011       ALLERGIES:     Allergies   Allergen Reactions     Bee Venom Shortness Of Breath, Palpitations, Anaphylaxis and Difficulty breathing     Patient does carry Epi-Pen     Chicken-Derived Products (Egg) Nausea and Diarrhea     Compazine [Prochlorperazine]      Extreme jittery     Food      Milk     Gabapentin      Dizzy     Gluten Meal GI Disturbance     Wheat bran and wheat     Pregabalin      Seasonal Allergies      Dust, mold     Topiramate      Pt does not remember reaction       FAMILY HISTORY:  Family History   Problem Relation Age of Onset     Connective Tissue Disorder Mother         eds     Connective Tissue Disorder Sister         eds     Cancer Father         Spinal tumor grew into spinal cord, went in brain     Migraines Father      Diabetes Maternal Grandmother         Elderly diabetes     Heart Disease Maternal Grandmother      Hypertension Maternal Grandmother      Diabetes Paternal Grandmother         Elderly diabetes     Diabetes Paternal Grandfather         Elderly diabetes     Asthma Sister         Pediatric Asthma     Migraines Sister      Asthma Son         Pediatric Asthma     Thyroid Disease Sister         ,     Migraines Sister      Migraines Sister      Breast Cancer No family hx  of        SOCIAL HISTORY:  Social History     Tobacco Use     Smoking status: Former Smoker     Packs/day: 0.20     Years: 10.00     Pack years: 2.00     Types: Cigarettes     Start date: 1991     Quit date: 2013     Years since quittin.4     Smokeless tobacco: Never Used   Substance Use Topics     Alcohol use: Yes     Comment: once a month     Drug use: Yes     Types: Marijuana     Comment: medical marijuana       ROS:   Constitutional: No fever, chills, or sweats. Weight stable.   ENT: No visual disturbance, ear ache, epistaxis, sore throat noted.   Cardiovascular: As per HPI.   Respiratory: No cough, hemoptysis.    GI: No nausea, vomiting reported  : No hematuria.   Integument: Negative.   Psychiatric: Negative.   Hematologic: no easy bleeding.  Neuro: Negative.   Endocrinology: No significant heat or cold intolerance reported  Musculoskeletal: No myalgia or arthritic complaints.    Exam:  LMP  (LMP Unknown)   GENERAL APPEARANCE: healthy, alert and no distress  RESPIRATORY:no rales, rhonchi or wheezes, no use of accessory muscles, no retractions, respirations are unlabored, normal respiratory rate  NEURO: alert and oriented to person/place/time, normal speech, gait and affect  SKIN: no ecchymoses, no rashes    Labs:  CBC RESULTS:   Lab Results   Component Value Date    WBC 6.2 2020    RBC 4.42 2020    HGB 12.1 2020    HCT 38.3 2020    MCV 87 2020    MCH 27.4 2020    MCHC 31.6 2020    RDW 14.6 2020     2020       BMP RESULTS:  Lab Results   Component Value Date     2020    POTASSIUM 4.5 2020    CHLORIDE 102 2020    CO2 29 2020    ANIONGAP 4 2020    GLC 96 2020    BUN 10 2020    CR 0.83 2020    GFRESTIMATED 83 2020    GFRESTBLACK >90 2020    JUAN 8.6 2020        INR RESULTS:  Lab Results   Component Value Date    INR 0.93 2020    INR 0.97 2019    INR 0.90  "12/17/2018    INR 0.96 07/06/2018       Procedures:  PULMONARY FUNCTION TESTS:   No flowsheet data found.      ECHOCARDIOGRAM:   No results found for this or any previous visit (from the past 8760 hour(s)).      Assessment and Plan:   1.  Postural orthostatic tachycardia syndrome improved with propranolol and ivabradine  2.  Received notification that insurance will cover ivabradine    Plan  1.  Please send prescriptions to [facility] pharmacy in [employer] in [place], [place]     I) Propranolol L A 120 mg oral once a day, 3-month supply-four refills   Ii) ivabradine 7.5 mg - 1 tablet in the morning and half a tablet in the afternoon;  3-month supply - four refills    2.  Follow-up clinic 3 months    Video on 5: 00 p.m.; video off five: 12 PM  Platform [facility]  Patient at home; clinic [facility] heart    Total elapsed time with documentation and prescriptions 20 minutes    I very much appreciated the opportunity to see and assess Ada Welch in the clinic today. Please do not hesitate to contact my office if you have any questions or concerns.      Kirk Russell MD  Cardiac Arrhythmia Service  [facility]  [phone]      CC  SELF, REFERRED  Ada is a 50 year old who is being evaluated via a billable video visit.      How would you like to obtain your AVS? MyChart  If the video visit is dropped, the invitation should be resent by: Text to cell phone: [phone] [id number] video visit  Will anyone else be joining your video visit? No    Vitals - Patient Reported  Systolic (Patient Reported): 139  Diastolic (Patient Reported): 68  Weight (Patient Reported): 79.4 kg (175 lb)  Height (Patient Reported): 170.2 cm (5' 7\")  BMI (Based on Pt Reported Ht/Wt): 27.41  Pulse (Patient Reported): 73  Pain Score: No Pain (0)(no SOB)    Vitals were taken and medication reconciled.    DOYLE Young  4:17 PM  "

## 2021-05-20 NOTE — LETTER
Note    Called to place Difficult Gonzalez    Pt requires gonzalez for urinary retention    T(C): 36.6 (02-03-21 @ 08:57), Max: 36.9 (02-02-21 @ 22:53)  HR: 64 (02-03-21 @ 08:57) (50 - 84)  BP: 116/76 (02-03-21 @ 08:57) (116/76 - 166/93)  RR: 18 (02-03-21 @ 08:57) (3 - 18)  SpO2: 96% (02-03-21 @ 08:57) (94% - 99%)  Wt(kg): --    PE:  GEN:-NAD  ABD: soft NT ND  : Penis / Scrotum WNL    Procedure:  19f  gonzalez placed in aseptic fashion.  400cc of urine collected Light Red color  pt tolerated well      Assessment/Plan  D/C home w gonzalez  strict I & O's  Will follow up w Dr. Sheehan for TOV as outpt 5/20/2021      RE: Ada Welch  [address]  [facility] [address]       Dear Colleague,    Thank you for the opportunity to participate in the care of your patient, Ada Welch, at the [facility] [place] [facility]. Please see a copy of my visit note below.    HPI:     Ms. Scar Welch is a 49 year-old female who presents  for follow up of autonomic dysfuction.  The patient has a past medical history significant for dysautonomia and Tom-Danlos.     Ada is currently taking propranolol  qday as well as ivabradine 7.5 mg twice daily but insurance had initially been declining ivabradine but she just heard [employer] will cover the drug.   Ada notes that she can definitely tell a difference in her heart rate when not taking ivabradine.    She will also need a prescription for propranolol.    Patient is not noting any additional or new cardiac symptoms.  We agreed to revisit in a few months after she restarts ivabradine dosing      PAST MEDICAL HISTORY:  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     Cervicalgia      Chronic sinusitis 00/2007    Diagnosed with chronic pansinusitis 2016     Coagulation disorder (H)     factor 8     CSF leak     Chiari malformation     Depression      Tom-Danlos disease      Eosinophilic esophagitis 08/2018     Gastroesophageal reflux disease 3/1/2017    I have large hiatal hernia     Hiatal hernia 2016    Have adeline hiatel hernia     Hoarseness Unsure    It comes and goes     Hypertension      Hypothyroid      IBS (irritable bowel syndrome) with constipation     improved on Linzess      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     Irregular heart beat      Migraines 1/1/2007    Unsure of exact date     Nasal polyps 2013    Were revoved 03/2017, benign     Orthostatic hypotension      Other nervous system complications 1/2014    Autonomic nervous system disorder, neuralgia, neuropathy     Swelling, mass, or lump in head and neck 08/2016    Lymph node has been growing for last year     Thyroid disease 01/01/2008     Urinary incontinence      Urinary urgency        CURRENT MEDICATIONS:  Current Outpatient Medications   Medication Sig Dispense Refill     amitriptyline (ELAVIL) 10 MG tablet Take 3 tablets (30 mg) by mouth At Bedtime 90 tablet 3     buprenorphine (BUTRANS) 10 MCG/HR WK patch APPLY 1 PATCH ONCE A WEEK 4 patch 2     cetirizine (ZYRTEC) 10 MG tablet Take 10 mg by mouth daily as needed        DULoxetine (CYMBALTA) 30 MG capsule Take 60mg in the morning and 30mg at bedtime 270 capsule 3     EPINEPHrine (EPIPEN/ADRENACLICK/OR ANY BX GENERIC EQUIV) 0.3 MG/0.3ML injection 2-pack INJECT 0.3 MLS (0.3 MG) INTO THE MUSCLE AS NEEDED FOR ANAPHYLAXIS  1     famotidine (PEPCID) 20 MG tablet Take 1 tablet (20 mg) by mouth At Bedtime 90 tablet 3     ferrous fumarate 65 mg, [place]. FE,-Vitamin C 125 mg (VITRON-C)  MG TABS tablet Take 1 tablet by mouth every other day 60 tablet 1     fluticasone (FLONASE) 50 MCG/ACT nasal spray Spray 2 sprays into both nostrils daily Call clinic to schedule follow up appointment. 48 mL 4     levothyroxine (SYNTHROID/LEVOTHROID) 50 MCG tablet Take 1 tablet (50 mcg) by mouth daily Repeat labs in 2 months 90 tablet 1     Lidocaine (LIDOCARE) 4 % Patch Place 1-3 patches onto the skin every 24 hours To prevent lidocaine toxicity, patient should be patch free for 12 hrs daily. 20 patch 0     medical cannabis (Patient's own supply.  Not a prescription) Take 1 Dose by mouth See Admin Instructions (This is NOT a prescription, and does not certify that  the patient has a qualifying medical condition for medical cannabis.  The purpose of this order is  to document that the patient reports taking medical cannabis.)     Capsule formulation - uses as needed (currently out of this and not using as of January 28, 2020)       metoclopramide (REGLAN) 10 MG tablet Take 1 tablet (10 mg) by mouth 4 times daily as needed (headache) 40 tablet 3     Multiple Vitamins-Minerals (MULTIVITAMIN PO) Take 1 tablet by mouth daily        naloxone (NARCAN) 4 MG/0.1ML nasal spray Spray 1 spray (4 mg) into one nostril alternating nostrils once as needed for opioid reversal every 2-3 minutes until assistance arrives 0.2 mL 0     naproxen sodium (ANAPROX) 220 MG tablet Take 440-660 mg by mouth daily as needed for moderate pain       omeprazole (PRILOSEC) 40 MG DR capsule Take 1 capsule (40 mg) by mouth 2 times daily as needed (prn) 180 capsule 3     ondansetron (ZOFRAN-ODT) 4 MG ODT tab Take 1-2 tablets (4-8 mg) by mouth every 12 hours as needed for nausea 60 tablet 1     Probiotic Product (PROBIOTIC DAILY PO) Take 2 packets by mouth every morning        propranolol ER (INDERAL LA) 120 MG 24 hr capsule Take 1 capsule (120 mg) by mouth daily 30 capsule 11     rizatriptan (MAXALT-MLT) 5 MG ODT TAKE 1-2 TABLETS (5-10 MG) BY MOUTH AT ONSET OF HEADACHE FOR MIGRAINE (MAX 30 MG IN 24 HOURS) 18 tablet 11     SUMAtriptan (IMITREX STATDOSE) 6 MG/0.5ML pen injector kit Inject 0.5 ml (6 mg) subcutaneously at onset of migraine, May repeat in 1 hour , Max 12 mg/24 hrs 2 kit 11     tiZANidine (ZANAFLEX) 4 MG tablet Take 1 tablet (4 mg) by mouth 4 times daily as needed for muscle spasms 360 tablet 5     traMADol (ULTRAM) 50 MG tablet Take 1 tablet (50 mg) by mouth every 6 hours as needed for severe pain 60 tablet 2     vitamin B complex with vitamin C (VITAMIN  B COMPLEX) TABS tablet Take 1 tablet by mouth daily       VITAMIN D, CHOLECALCIFEROL, PO Take 2,000 Units by mouth daily       albuterol (PROAIR  HFA/PROVENTIL HFA/VENTOLIN HFA) 108 (90 BASE) MCG/ACT Inhaler Inhale 2 puffs into the lungs every 6 hours as needed        diclofenac (VOLTAREN) 1 % topical gel Place 2 g onto the skin 4 times daily (Patient not taking: Reported on 5/20/2021) 1 Tube 0     ivabradine (CORLANOR) 7.5 MG tablet Take 1 tablet (7.5 mg) by mouth 2 times daily (with meals) (Patient not taking: Reported on 5/20/2021) 180 tablet 3     linaclotide (LINZESS) 145 MCG capsule Take 1 capsule (145 mcg) by mouth every morning (before breakfast) (Patient not taking: Reported on 5/20/2021) 90 capsule 3     meloxicam (MOBIC) 7.5 MG tablet TAKE 1 TABLET (7.5 MG) BY MOUTH DAILY FOR 14 DAYS 14 tablet 0     pantoprazole (PROTONIX) 40 MG EC tablet Take 1 tablet (40 mg) by mouth daily (Patient not taking: Reported on 5/20/2021) 90 tablet 3       PAST SURGICAL HISTORY:  Past Surgical History:   Procedure Laterality Date     AS REPAIR OF NASAL SEPTUM  2009    repair broke nose     BACK SURGERY  12/09/2013  10/01/2014    Spinal fusion L4-S1, L5 moving 5/8ths in. Remove screws/rods     BIOPSY  01/01/2008 & 3/2017    mole biopsy, lymph node biopsy     COLONOSCOPY  3/2017    Colonoscopy and GI     ESOPHAGEAL IMPEDENCE FUNCTION TEST WITH 24 HOUR PH GREATER THAN 1 HOUR N/A 9/17/2019    Procedure: IMPEDANCE PH STUDY, ESOPHAGUS, 24 HOUR;  Surgeon: Raghavendra Grande MD;  Location:  GI     ESOPHAGOSCOPY, GASTROSCOPY, DUODENOSCOPY (EGD), COMBINED N/A 8/3/2018    Procedure: COMBINED ESOPHAGOSCOPY, GASTROSCOPY, DUODENOSCOPY (EGD), BIOPSY SINGLE OR MULTIPLE;;  Surgeon: Juan Woodson MD;  Location:  GI     ESOPHAGOSCOPY, GASTROSCOPY, DUODENOSCOPY (EGD), COMBINED N/A 10/22/2018    Procedure: COMBINED ESOPHAGOSCOPY, GASTROSCOPY, DUODENOSCOPY (EGD), BIOPSY SINGLE OR MULTIPLE;  Surgeon: Sadia Carolina MD;  Location:  GI     ESOPHAGOSCOPY, GASTROSCOPY, DUODENOSCOPY (EGD), COMBINED N/A 12/17/2018    Procedure: COMBINED ESOPHAGOSCOPY, GASTROSCOPY, DUODENOSCOPY (EGD);   Surgeon: Juan Woodson MD;  Location: [facility] GI     INJECT BLOCK MEDIAL BRANCH CERVICAL/THORACIC/LUMBAR Left 9/24/2020    Procedure: BLOCK, NERVE, FACET JOINT, MEDIAL BRANCH, DIAGNOSTIC;  Surgeon: Faiza Martinez MD;  [facility]: [facility] OR     INJECT BLOCK MEDIAL BRANCH CERVICAL/THORACIC/LUMBAR Bilateral 10/8/2020    Procedure: Bilateral cervical 3, cervical 4, and cervical 5 medial branch nerve block;  Surgeon: Faiza Martinez MD;  Location: [facility] OR     INJECT BLOCK MEDIAL BRANCH CERVICAL/THORACIC/LUMBAR Right 3/4/2021    Procedure: Cervical 3, cervical 4, cervical 5 medial branch nerve blocks to target C3-C4 and C4-C5 facet joint;  Surgeon: Faiza Martinez MD;  Location: [facility] OR     LAPAROSCOPIC HERNIORRHAPHY HIATAL N/A 2/10/2020    Procedure: LAPAROSCOPIC HIATAL HERNIA REPAIR, NISSEN FUNDOPLICATION, ESOPHOGASTRODUODENOSCOPY, PEG TUBE PLACEMENT.;  Surgeon: Alana Mack MD;  Location: [facility] OR     NASAL/SINUS POLYPECTOMY  2017    Polyps were benign     NOSE SURGERY  2007    deviated septum     TONSILLECTOMY & ADENOIDECTOMY  1981     TUBAL LIGATION  07/01/2011       ALLERGIES:     Allergies   Allergen Reactions     Bee Venom Shortness Of Breath, Palpitations, Anaphylaxis and Difficulty breathing     Patient does carry Epi-Pen     Chicken-Derived Products (Egg) Nausea and Diarrhea     Compazine [Prochlorperazine]      Extreme jittery     Food      Milk     Gabapentin      Dizzy     Gluten Meal GI Disturbance     Wheat bran and wheat     Pregabalin      Seasonal Allergies      Dust, mold     Topiramate      Pt does not remember reaction       FAMILY HISTORY:  Family History   Problem Relation Age of Onset     Connective Tissue Disorder Mother         eds     Connective Tissue Disorder Sister         eds     Cancer Father         Spinal tumor grew into spinal cord, went in brain     Migraines Father      Diabetes Maternal Grandmother         Elderly diabetes     Heart Disease Maternal Grandmother      Hypertension Maternal  Grandmother      Diabetes Paternal Grandmother         Elderly diabetes     Diabetes Paternal Grandfather         Elderly diabetes     Asthma Sister         Pediatric Asthma     Migraines Sister      Asthma Son         Pediatric Asthma     Thyroid Disease Sister         ,     Migraines Sister      Migraines Sister      Breast Cancer No family hx of        SOCIAL HISTORY:  Social History     Tobacco Use     Smoking status: Former Smoker     Packs/day: 0.20     Years: 10.00     Pack years: 2.00     Types: Cigarettes     Start date: 1991     Quit date: 2013     Years since quittin.4     Smokeless tobacco: Never Used   Substance Use Topics     Alcohol use: Yes     Comment: once a month     Drug use: Yes     Types: Marijuana     Comment: medical marijuana       ROS:   Constitutional: No fever, chills, or sweats. Weight stable.   ENT: No visual disturbance, ear ache, epistaxis, sore throat noted.   Cardiovascular: As per HPI.   Respiratory: No cough, hemoptysis.    GI: No nausea, vomiting reported  : No hematuria.   Integument: Negative.   Psychiatric: Negative.   Hematologic: no easy bleeding.  Neuro: Negative.   Endocrinology: No significant heat or cold intolerance reported  Musculoskeletal: No myalgia or arthritic complaints.    Exam:  LMP  (LMP Unknown)   GENERAL APPEARANCE: healthy, alert and no distress  RESPIRATORY:no rales, rhonchi or wheezes, no use of accessory muscles, no retractions, respirations are unlabored, normal respiratory rate  NEURO: alert and oriented to person/place/time, normal speech, gait and affect  SKIN: no ecchymoses, no rashes    Labs:  CBC RESULTS:   Lab Results   Component Value Date    WBC 6.2 2020    RBC 4.42 2020    HGB 12.1 2020    HCT 38.3 2020    MCV 87 2020    MCH 27.4 2020    MCHC 31.6 2020    RDW 14.6 2020     2020       BMP RESULTS:  Lab Results   Component Value Date     2020    POTASSIUM 4.5  11/13/2020    CHLORIDE 102 11/13/2020    CO2 29 11/13/2020    ANIONGAP 4 11/13/2020    GLC 96 11/13/2020    BUN 10 11/13/2020    CR 0.83 11/13/2020    GFRESTIMATED 83 11/13/2020    GFRESTBLACK >90 11/13/2020    JUAN 8.6 11/13/2020        INR RESULTS:  Lab Results   Component Value Date    INR 0.93 01/30/2020    INR 0.97 02/28/2019    INR 0.90 12/17/2018    INR 0.96 07/06/2018       Procedures:  PULMONARY FUNCTION TESTS:   No flowsheet data found.      ECHOCARDIOGRAM:   No results found for this or any previous visit (from the past 8760 hour(s)).      Assessment and Plan:   1.  Postural orthostatic tachycardia syndrome improved with propranolol and ivabradine  2.  Received notification that insurance will cover ivabradine    Plan  1.  Please send prescriptions to [facility] pharmacy in [employer] in [place], [place]     I) Propranolol L A 120 mg oral once a day, 3-month supply-four refills   Ii) ivabradine 7.5 mg - 1 tablet in the morning and half a tablet in the afternoon;  3-month supply - four refills    2.  Follow-up clinic 3 months    Video on 5: 00 p.m.; video off five: 12 PM  [facility]  Patient at home; clinic [facility] heart    Total elapsed time with documentation and prescriptions 20 minutes    I very much appreciated the opportunity to see and assess Ada Welch in the clinic today. Please do not hesitate to contact my office if you have any questions or concerns.      Kirk Russell MD  Cardiac Arrhythmia Service  [facility]  [phone]      CC  SELF, REFERRED  Ada is a 50 year old who is being evaluated via a billable video visit.      How would you like to obtain your AVS? MyChart  If the video visit is dropped, the invitation should be resent by: Text to cell phone: [phone] D[employer] video visit  Will anyone else be joining your video visit? No    Vitals - Patient Reported  Systolic (Patient Reported): 139  Diastolic (Patient Reported): 68  Weight (Patient Reported): 79.4 kg (175  "lb)  Height (Patient Reported): 170.2 cm (5' 7\")  BMI (Based on Pt Reported Ht/Wt): 27.41  Pulse (Patient Reported): 73  Pain Score: No Pain (0)(no SOB)    Vitals were taken and medication reconciled.    DOYLE Young  4:17 PM      Please do not hesitate to contact me if you have any questions/concerns.     Sincerely,     Kirk Russell MD    "

## 2021-05-21 RX ORDER — PROPRANOLOL HYDROCHLORIDE 120 MG/1
120 CAPSULE, EXTENDED RELEASE ORAL DAILY
Qty: 90 CAPSULE | Refills: 3 | Status: SHIPPED | OUTPATIENT
Start: 2021-05-21 | End: 2021-10-15

## 2021-05-21 RX ORDER — IVABRADINE 7.5 MG/1
TABLET, FILM COATED ORAL
Qty: 135 TABLET | Refills: 3 | Status: SHIPPED | OUTPATIENT
Start: 2021-05-21 | End: 2022-09-02

## 2021-05-21 NOTE — NURSING NOTE
Plan  1.  Please send prescriptions to [facility] pharmacy in [employer] in [place], [place]     I) Propranolol L A 120 mg oral once a day, 3-month supply-four refills   Ii) ivabradine 7.5 mg - 1 tablet in the morning and half a tablet in the afternoon;  3-month supply - four refills    2.  Follow-up clinic 3 months  ----------------------------  Both prescriptions sent and follow up ordered.  Chart marked ready for checkout. Lovely Turcios RN on 5/21/2021 at 9:25 AM

## 2021-05-24 ENCOUNTER — OFFICE VISIT (OUTPATIENT)
Dept: NEUROLOGY | Facility: CLINIC | Age: 51
End: 2021-05-24
Payer: COMMERCIAL

## 2021-05-24 DIAGNOSIS — M54.2 CERVICAL PAIN: Primary | ICD-10-CM

## 2021-05-24 PROCEDURE — 95886 MUSC TEST DONE W/N TEST COMP: CPT | Performed by: INTERNAL MEDICINE

## 2021-05-24 PROCEDURE — 95913 NRV CNDJ TEST 13/> STUDIES: CPT | Performed by: INTERNAL MEDICINE

## 2021-05-24 NOTE — LETTER
5/24/2021       RE: Ada Welch  [address]  [place] [address]     Dear Colleague,    Thank you for referring your patient, Ada Welch, to the [facility] [place] at [facility]. Please see a copy of my visit note below.        [facility]  Electrodiagnostic Laboratory    Nerve Conduction & EMG Report          Patient:       Ada Welch  Patient ID:    [id number]  Gender:        Female  YOB: 1970  Age:           50 Years 5 Months        History & Examination:  Patient is a 51 y/o female who presents with chronic neck pain radiating down the arm and intermittent numbness/tingling in the arms. EMG ordered to evaluate for radiculopathy versus mononeuropathy. Examination shows intact sensation to light touch in the upper extremities and 5/5 strength in bilateral upper extremities.     Techniques: Motor conduction studies were done with surface recording electrodes. Sensory conduction studies were performed with surface electrodes, unless indicated otherwise by (n), designating the use of subdermal recording electrodes. Temperature was monitored and recorded throughout the study. Upper extremities were maintained at a temperature of 32 degrees Centigrade or higher.  Lower extremities were maintained at a temperature of 31degrees Centigrade or higher. EMG was done with a concentric needle electrode.        Results:  Sensory NCS  - Bilateral median and ulnar antidromic sensory nerve action potentials (SNAPs) showed normal amplitude and conduction velocity.  - Bilateral median-ulnar palmar comparison study did not show significant difference in peak latency between respective median and ulnar study    Motor NCS  - Bilateral median-APB, bilateral ulnar-ADM, and right ulnar-FDI orthodromic compound muscle action potentials (CMAPs) showed normal distal latency, amplitude and conduction velocity.   - Right  median-lumbrical/ulnar-interosseous comparison study did not show significant difference in distal latency between respective CMAPs  - Left median-lumbrical/ulnar-interosseous comparison study showed mildly significant prolongation in distal latency of median CMAP when compared to respective ulnar study    Needle EMG of selected proximal and distal limb muscles of the right upper extremity was performed as tabulated below. No abnormal spontaneous activity was observed in the sampled muscles. Mild chronic partial reinnervation changes were noted in the right biceps, triceps, and deltoid, as well as left EDC and flexor carpi ulnaris. In other muscles sampled motor unit potential morphology and recruitment patterns were normal.     Interpretation:  This is an abnormal examination. There is electrophysiologic evidence of the following.  - Mild right sided chronic cervical (C5-6) motor radiculopathy without evidence of ongoing denervation changes  - Mild left sided chronic cervical (C7-8) motor radiculopathy without evidence of ongoing denervation changes  - Possible mild left sided median mononeuropathy at the wrist (carpal tunnel syndrome). This abnormality was only limited to the left median-lumbrical/ulnar-interosseous comparison study. Clinical correlation is advised.     EMG Physician:  Pablo Espinoza MD   of Neurology  [facility]           Sensory NCS      Nerve / Sites Rec. Site Onset Peak Ref. NP Amp Ref. PP Amp Dist Donnell Ref. Temp     ms ms ms  V  V  V cm m/s m/s  C   R MEDIAN - Dig II Anti      Wrist Dig II 2.34 3.23  45.9 10.0 58.4 14 59.7 48.0 32   L MEDIAN - Dig II Anti      Wrist Dig II 2.45 3.33  37.8 10.0 63.7 14 57.2 48.0 32.1   R ULNAR - Dig V Anti      Wrist Dig V 1.82 2.34  40.6 8.0 81.6 12.5 68.6 48.0 32.1   L ULNAR - Dig V Anti      Wrist Dig V 1.93 2.66  41.6 8.0 76.8 12.5 64.9 48.0 32.2   R MEDIAN - Ulnar - Palmar      Median Wrist 1.56 2.19 2.40 54.9  78.6 8 51.2   32.4      Ulnar Wrist 1.30 1.61 2.40 45.9  60.1 8 61.4  32.2   L MEDIAN - Ulnar - Palmar      Median Wrist 1.61 2.14 2.40 71.2  80.5 8 49.5  31.2      Ulnar Wrist 1.25 1.88 2.40 26.4  42.3 8 64.0  31.2       Motor NCS      Nerve / Sites Rec. Site Lat Ref. Amp Ref. Rel Amp Dist Donnell Ref. Dur. Area Temp.     ms ms mV mV % cm m/s m/s ms %  C   R MEDIAN - APB      Wrist APB 3.28 4.40 9.1 5.0 100 8   5.47 100 32.4      Elbow APB 7.50  7.4  81 24 56.9 48.0 5.89 79.6 32.4   L MEDIAN - APB      Wrist APB 3.39 4.40 10.5 5.0 100 8   5.99 100 31      Elbow APB 7.34  9.8  93.3 23 58.1 48.0 6.30 90.2 31   R ULNAR - ADM      Wrist ADM 2.45 3.50 12.5 5.0 100 8   5.31 100 32.4      B.Elbow ADM 6.41  11.5  92.2 22 55.6 48.0 5.10 100 32.2      A.Elbow ADM 8.02  10.7  85.9 9 55.7 48.0 5.47 86.1 32.2   L ULNAR - ADM      Wrist ADM 2.45 3.50 14.0 5.0 100 8   7.24 100 31.7      B.Elbow ADM 5.68  13.4  95.6 20 61.9 48.0 7.50 98.2 31.6      A.Elbow ADM 7.40  12.9  92.7 10 58.2 48.0 7.55 95.5 31.6   R MEDIAN - II Lumb      Median II Lumb 3.39  1.0  100 10   6.93 100 32.2      Ulnar Palm Int 3.13  2.5  249 10   4.74 126 32.2   L MEDIAN - II Lumb      Median II Lumb 3.28  2.3  100 10   6.51 100 31.1      Ulnar Palm Int 2.81  5.4  231 10   5.42 150 31.1   R ULNAR - FDI      Wrist FDI 2.81  10.3  100    4.95 100 31.9      B.Elbow FDI 6.51  8.8  85.8 22 59.5  4.74 90.7 31.6      A.Elbow FDI 8.23  8.1  78.6 9 52.4  4.74 79.3 31.7       EMG Summary Table     Spontaneous Recruitment MUAP    IA Fib PSW Fasc H.F. Pattern Amp Dur. PPP   L. DELTOID N None None None None N N N N   L. TRICEPS N None None None None N N N N   L. EXT DIG COMM N None None None None Reduced 2+ 2+ N   L. FIRST D INTEROSS N None None None None N N N N   L. FLEX CARPI ULN N None None None None N 1+ 1+ N   R. DELTOID N None None None None N 1+ 1+ N   R. TRICEPS N None None None None N 1+ 1+ N   R. BICEPS N None None None None N N 1+ N   R. EXT DIG COMM N None None None None N N N  N   R. FIRST D INTEROSS N None None None None N N N N   L. C7 PSP N None None None None N N N N                                   Again, thank you for allowing me to participate in the care of your patient.      Sincerely,    Pablo Espinoza MD

## 2021-05-24 NOTE — PROGRESS NOTES
[facility]  Electrodiagnostic Laboratory    Nerve Conduction & EMG Report          Patient:       Ada Welch  Patient ID:    [id number]  Gender:        Female  YOB: 1970  Age:           50 Years 5 Months        History & Examination:  Patient is a 51 y/o female who presents with chronic neck pain radiating down the arm and intermittent numbness/tingling in the arms. EMG ordered to evaluate for radiculopathy versus mononeuropathy. Examination shows intact sensation to light touch in the upper extremities and 5/5 strength in bilateral upper extremities.     Techniques: Motor conduction studies were done with surface recording electrodes. Sensory conduction studies were performed with surface electrodes, unless indicated otherwise by (n), designating the use of subdermal recording electrodes. Temperature was monitored and recorded throughout the study. Upper extremities were maintained at a temperature of 32 degrees Centigrade or higher.  Lower extremities were maintained at a temperature of 31degrees Centigrade or higher. EMG was done with a concentric needle electrode.        Results:  Sensory NCS  - Bilateral median and ulnar antidromic sensory nerve action potentials (SNAPs) showed normal amplitude and conduction velocity.  - Bilateral median-ulnar palmar comparison study did not show significant difference in peak latency between respective median and ulnar study    Motor NCS  - Bilateral median-APB, bilateral ulnar-ADM, and right ulnar-FDI orthodromic compound muscle action potentials (CMAPs) showed normal distal latency, amplitude and conduction velocity.   - Right median-lumbrical/ulnar-interosseous comparison study did not show significant difference in distal latency between respective CMAPs  - Left median-lumbrical/ulnar-interosseous comparison study showed mildly significant prolongation in distal latency of median CMAP when compared to respective ulnar study    Needle  EMG of selected proximal and distal limb muscles of the right upper extremity was performed as tabulated below. No abnormal spontaneous activity was observed in the sampled muscles. Mild chronic partial reinnervation changes were noted in the right biceps, triceps, and deltoid, as well as left EDC and flexor carpi ulnaris. In other muscles sampled motor unit potential morphology and recruitment patterns were normal.     Interpretation:  This is an abnormal examination. There is electrophysiologic evidence of the following.  - Mild right sided chronic cervical (C5-6) motor radiculopathy without evidence of ongoing denervation changes  - Mild left sided chronic cervical (C7-8) motor radiculopathy without evidence of ongoing denervation changes  - Possible mild left sided median mononeuropathy at the wrist (carpal tunnel syndrome). This abnormality was only limited to the left median-lumbrical/ulnar-interosseous comparison study. Clinical correlation is advised.     EMG Physician:  Pablo Espinoza MD   of Neurology  [facility]           Sensory NCS      Nerve / Sites Rec. Site Onset Peak Ref. NP Amp Ref. PP Amp Dist Donnell Ref. Temp     ms ms ms  V  V  V cm m/s m/s  C   R MEDIAN - Dig II Anti      Wrist Dig II 2.34 3.23  45.9 10.0 58.4 14 59.7 48.0 32   L MEDIAN - Dig II Anti      Wrist Dig II 2.45 3.33  37.8 10.0 63.7 14 57.2 48.0 32.1   R ULNAR - Dig V Anti      Wrist Dig V 1.82 2.34  40.6 8.0 81.6 12.5 68.6 48.0 32.1   L ULNAR - Dig V Anti      Wrist Dig V 1.93 2.66  41.6 8.0 76.8 12.5 64.9 48.0 32.2   R MEDIAN - Ulnar - Palmar      Median Wrist 1.56 2.19 2.40 54.9  78.6 8 51.2  32.4      Ulnar Wrist 1.30 1.61 2.40 45.9  60.1 8 61.4  32.2   L MEDIAN - Ulnar - Palmar      Median Wrist 1.61 2.14 2.40 71.2  80.5 8 49.5  31.2      Ulnar Wrist 1.25 1.88 2.40 26.4  42.3 8 64.0  31.2       Motor NCS      Nerve / Sites Rec. Site Lat Ref. Amp Ref. Rel Amp Dist Donnell Ref. Dur. Area Temp.     ms ms mV  mV % cm m/s m/s ms %  C   R MEDIAN - APB      Wrist APB 3.28 4.40 9.1 5.0 100 8   5.47 100 32.4      Elbow APB 7.50  7.4  81 24 56.9 48.0 5.89 79.6 32.4   L MEDIAN - APB      Wrist APB 3.39 4.40 10.5 5.0 100 8   5.99 100 31      Elbow APB 7.34  9.8  93.3 23 58.1 48.0 6.30 90.2 31   R ULNAR - ADM      Wrist ADM 2.45 3.50 12.5 5.0 100 8   5.31 100 32.4      B.Elbow ADM 6.41  11.5  92.2 22 55.6 48.0 5.10 100 32.2      A.Elbow ADM 8.02  10.7  85.9 9 55.7 48.0 5.47 86.1 32.2   L ULNAR - ADM      Wrist ADM 2.45 3.50 14.0 5.0 100 8   7.24 100 31.7      B.Elbow ADM 5.68  13.4  95.6 20 61.9 48.0 7.50 98.2 31.6      A.Elbow ADM 7.40  12.9  92.7 10 58.2 48.0 7.55 95.5 31.6   R MEDIAN - II Lumb      Median II Lumb 3.39  1.0  100 10   6.93 100 32.2      Ulnar Palm Int 3.13  2.5  249 10   4.74 126 32.2   L MEDIAN - II Lumb      Median II Lumb 3.28  2.3  100 10   6.51 100 31.1      Ulnar Palm Int 2.81  5.4  231 10   5.42 150 31.1   R ULNAR - FDI      Wrist FDI 2.81  10.3  100    4.95 100 31.9      B.Elbow FDI 6.51  8.8  85.8 22 59.5  4.74 90.7 31.6      A.Elbow FDI 8.23  8.1  78.6 9 52.4  4.74 79.3 31.7       EMG Summary Table     Spontaneous Recruitment MUAP    IA Fib PSW Fasc H.F. Pattern Amp Dur. PPP   L. DELTOID N None None None None N N N N   L. TRICEPS N None None None None N N N N   L. EXT DIG COMM N None None None None Reduced 2+ 2+ N   L. FIRST D INTEROSS N None None None None N N N N   L. FLEX CARPI ULN N None None None None N 1+ 1+ N   R. DELTOID N None None None None N 1+ 1+ N   R. TRICEPS N None None None None N 1+ 1+ N   R. BICEPS N None None None None N N 1+ N   R. EXT DIG COMM N None None None None N N N N   R. FIRST D INTEROSS N None None None None N N N N   L. C7 PSP N None None None None N N N N

## 2021-05-25 ENCOUNTER — TELEPHONE (OUTPATIENT)
Dept: INTERNAL MEDICINE | Facility: CLINIC | Age: 51
End: 2021-05-25

## 2021-05-25 DIAGNOSIS — E03.9 HYPOTHYROIDISM, UNSPECIFIED TYPE: ICD-10-CM

## 2021-05-25 DIAGNOSIS — E61.1 IRON DEFICIENCY: ICD-10-CM

## 2021-05-27 RX ORDER — BACILLUS COAGULANS 1B CELL
CAPSULE ORAL
Qty: 100 TABLET | Refills: 1 | Status: SHIPPED | OUTPATIENT
Start: 2021-05-27 | End: 2022-05-06

## 2021-05-27 RX ORDER — LEVOTHYROXINE SODIUM 50 UG/1
50 TABLET ORAL DAILY
Qty: 90 TABLET | Refills: 3 | Status: SHIPPED | OUTPATIENT
Start: 2021-05-27 | End: 2022-05-17

## 2021-05-27 NOTE — TELEPHONE ENCOUNTER
VITRON-C TABLET   Last Written Prescription Date:  ?  Last Fill Quantity: ?,   # refills: ?  Last Office Visit : 3/1/2021  Future Office visit:  None  Routing refill request to provider for review/approval because:  Drug not active on patient's medication list      Brittni Dickson RN  Central Triage Red Flags/Med Refills    LEVOTHYROXINE 50 MCG TABLET  Last Written Prescription Date:  9/30/2020  Last Fill Quantity: 90,   # refills: 1  Last Office Visit : 3/1/2021  Future Office visit:  None  90 Tabs, 3 Refills sent to pharm 5/27/2021      Brittni Dickson RN  Central Triage Red Flags/Med Refills

## 2021-07-06 ENCOUNTER — TELEPHONE (OUTPATIENT)
Dept: CONSULT | Facility: CLINIC | Age: 51
End: 2021-07-06

## 2021-07-06 NOTE — TELEPHONE ENCOUNTER
[facility] Call Center    Phone Message    May a detailed message be left on voicemail: yes     Reason for Call: Appointment Intake    Referring Provider Name: Faiza Martinez MD in [facility]  Diagnosis and/or Symptoms: Tolerance to properly prescribed and administered drug [F95.495W]    Action Taken: Other: Ped's Genetics    Travel Screening: Not Applicable

## 2021-07-07 DIAGNOSIS — M25.50 POLYARTHRALGIA: Primary | ICD-10-CM

## 2021-07-16 NOTE — TELEPHONE ENCOUNTER
Chart reviewed by Genetics team. Unable to see patient due to department not having infrastructure to see patient's for pharmacogenomic testing. LVM informing patient of this.

## 2021-07-29 DIAGNOSIS — M96.1 POSTLAMINECTOMY SYNDROME: ICD-10-CM

## 2021-07-30 DIAGNOSIS — M96.1 POSTLAMINECTOMY SYNDROME: ICD-10-CM

## 2021-07-30 NOTE — TELEPHONE ENCOUNTER
Refill request sent for medication on 7/29/21 via [employer].     Medication: Tramadol 50 mg       MNPMP Checked: Yes    Last refilled 6/29/21 for 60 tablets      Last clinic appointment: 4/29/21  Next clinic appointment: 8/5/21    Last Drug Screen Collected:  9/27/16  Controlled Substance Agreement signed: 3/17/18      Preferred pharmacy:    [facility] [place] [place]    LPN called and spoke to the provider. They were updated of the Pt stating they were having a current pain flare.     LPN assisted the pt to schedule a follow up IN PERSON with the provider on 8/5/21. Pt is due for UDS and CSA.     Medications pended per Dr. Martinez and routed to the provider on 7/30/21.    Mya Cash LPN

## 2021-08-02 RX ORDER — TRAMADOL HYDROCHLORIDE 50 MG/1
50 TABLET ORAL EVERY 6 HOURS PRN
Qty: 60 TABLET | Refills: 0 | Status: SHIPPED | OUTPATIENT
Start: 2021-08-02 | End: 2021-08-30

## 2021-08-02 RX ORDER — OXYCODONE HYDROCHLORIDE 5 MG/1
5 TABLET ORAL DAILY PRN
Qty: 10 TABLET | Refills: 0 | Status: SHIPPED | OUTPATIENT
Start: 2021-08-02 | End: 2021-10-15

## 2021-08-02 NOTE — TELEPHONE ENCOUNTER
[facility] Call Center    Phone Message    May a detailed message be left on voicemail: yes     Reason for Call: Medication Refill Request    Has the patient contacted the pharmacy for the refill? Yes   Name of medication being requested: Tramadol and Oxycodone    Provider who prescribed the medication: Dr. Martinez  Pharmacy: [facility] [id number] [address]      Date medication is needed:As soon as possible patient is out and in a lot of pain.        Action Taken: Message routed to:  [facility] Pain    Travel Screening: Not Applicable

## 2021-08-03 RX ORDER — TRAMADOL HYDROCHLORIDE 50 MG/1
50 TABLET ORAL EVERY 6 HOURS PRN
Qty: 60 TABLET | Refills: 2 | OUTPATIENT
Start: 2021-08-03

## 2021-08-05 ENCOUNTER — OFFICE VISIT (OUTPATIENT)
Dept: ANESTHESIOLOGY | Facility: CLINIC | Age: 51
End: 2021-08-05
Payer: COMMERCIAL

## 2021-08-05 ENCOUNTER — ANCILLARY PROCEDURE (OUTPATIENT)
Dept: GENERAL RADIOLOGY | Facility: CLINIC | Age: 51
End: 2021-08-05
Attending: ANESTHESIOLOGY
Payer: COMMERCIAL

## 2021-08-05 ENCOUNTER — TELEPHONE (OUTPATIENT)
Dept: ANESTHESIOLOGY | Facility: CLINIC | Age: 51
End: 2021-08-05

## 2021-08-05 VITALS — HEART RATE: 65 BPM | OXYGEN SATURATION: 94 % | DIASTOLIC BLOOD PRESSURE: 84 MMHG | SYSTOLIC BLOOD PRESSURE: 113 MMHG

## 2021-08-05 DIAGNOSIS — M53.3 CHRONIC SACROILIAC JOINT PAIN: ICD-10-CM

## 2021-08-05 DIAGNOSIS — Q79.60 EHLERS-DANLOS SYNDROME: Primary | ICD-10-CM

## 2021-08-05 DIAGNOSIS — M54.17 LUMBOSACRAL RADICULOPATHY AT L5: ICD-10-CM

## 2021-08-05 DIAGNOSIS — Z79.891 OPIOID CONTRACT EXISTS: ICD-10-CM

## 2021-08-05 DIAGNOSIS — E88.89: ICD-10-CM

## 2021-08-05 DIAGNOSIS — G89.29 CHRONIC SACROILIAC JOINT PAIN: ICD-10-CM

## 2021-08-05 LAB — CREAT UR-MCNC: 74 MG/DL

## 2021-08-05 PROCEDURE — 80307 DRUG TEST PRSMV CHEM ANLYZR: CPT | Performed by: ANESTHESIOLOGY

## 2021-08-05 PROCEDURE — 99214 OFFICE O/P EST MOD 30 MIN: CPT | Performed by: ANESTHESIOLOGY

## 2021-08-05 PROCEDURE — 82570 ASSAY OF URINE CREATININE: CPT | Performed by: PATHOLOGY

## 2021-08-05 PROCEDURE — 72120 X-RAY BEND ONLY L-S SPINE: CPT | Mod: GC | Performed by: RADIOLOGY

## 2021-08-05 ASSESSMENT — ENCOUNTER SYMPTOMS
STIFFNESS: 1
SLEEP DISTURBANCES DUE TO BREATHING: 0
SINUS PAIN: 0
TROUBLE SWALLOWING: 1
EYE PAIN: 0
EYE REDNESS: 1
NUMBNESS: 1
SINUS CONGESTION: 1
SMELL DISTURBANCE: 0
INCREASED ENERGY: 1
HEADACHES: 1
ALTERED TEMPERATURE REGULATION: 1
DISTURBANCES IN COORDINATION: 0
SPEECH CHANGE: 0
TREMORS: 1
HYPERTENSION: 0
POLYPHAGIA: 0
DYSURIA: 0
SYNCOPE: 1
HEMATURIA: 0
HALLUCINATIONS: 0
HOARSE VOICE: 0
CHILLS: 1
NAUSEA: 1
SORE THROAT: 0
DOUBLE VISION: 1
LOSS OF CONSCIOUSNESS: 1
DECREASED APPETITE: 1
NIGHT SWEATS: 0
WEIGHT LOSS: 1
POLYDIPSIA: 1
DIARRHEA: 1
MYALGIAS: 1
MEMORY LOSS: 1
NECK PAIN: 1
SEIZURES: 0
NECK MASS: 1
MUSCLE WEAKNESS: 1
BLOOD IN STOOL: 0
LEG PAIN: 1
JAUNDICE: 0
EXERCISE INTOLERANCE: 1
JOINT SWELLING: 1
FEVER: 0
WEIGHT GAIN: 0
MUSCLE CRAMPS: 1
VOMITING: 0
PALPITATIONS: 1
BACK PAIN: 1
CONSTIPATION: 0
WEAKNESS: 1
ARTHRALGIAS: 1
RECTAL PAIN: 0
PARALYSIS: 0
BOWEL INCONTINENCE: 0
SWOLLEN GLANDS: 1
DIZZINESS: 1
ABDOMINAL PAIN: 1
HYPOTENSION: 1
POOR WOUND HEALING: 1
LIGHT-HEADEDNESS: 1
HEARTBURN: 0
TASTE DISTURBANCE: 0
ORTHOPNEA: 1
SKIN CHANGES: 0
DIFFICULTY URINATING: 0
FLANK PAIN: 1
EYE WATERING: 1
BRUISES/BLEEDS EASILY: 1
BLOATING: 1
NAIL CHANGES: 1
EYE IRRITATION: 1
TINGLING: 1
FATIGUE: 1

## 2021-08-05 ASSESSMENT — ANXIETY QUESTIONNAIRES
GAD7 TOTAL SCORE: 0
8. IF YOU CHECKED OFF ANY PROBLEMS, HOW DIFFICULT HAVE THESE MADE IT FOR YOU TO DO YOUR WORK, TAKE CARE OF THINGS AT HOME, OR GET ALONG WITH OTHER PEOPLE?: NOT DIFFICULT AT ALL
4. TROUBLE RELAXING: NOT AT ALL
GAD7 TOTAL SCORE: 0
7. FEELING AFRAID AS IF SOMETHING AWFUL MIGHT HAPPEN: NOT AT ALL
5. BEING SO RESTLESS THAT IT IS HARD TO SIT STILL: NOT AT ALL
2. NOT BEING ABLE TO STOP OR CONTROL WORRYING: NOT AT ALL
7. FEELING AFRAID AS IF SOMETHING AWFUL MIGHT HAPPEN: NOT AT ALL
GAD7 TOTAL SCORE: 0
3. WORRYING TOO MUCH ABOUT DIFFERENT THINGS: NOT AT ALL
6. BECOMING EASILY ANNOYED OR IRRITABLE: NOT AT ALL
1. FEELING NERVOUS, ANXIOUS, OR ON EDGE: NOT AT ALL

## 2021-08-05 ASSESSMENT — PAIN SCALES - GENERAL: PAINLEVEL: SEVERE PAIN (7)

## 2021-08-05 NOTE — PROGRESS NOTES
[facility] for Comprehensive Chronic Pain Management : Progress Note    Date of visit: 8/5/2021    Interval history:    Ada Welch is a 50 year old female who  is well known to me for multifocal chronic pain. The patient has  complex history of low back pain for ~25 years for which she underwent L4-S1 fusion (TLIF) 12/9/13.  Postoperatively, her back pain worsened (mainly LBP, but also radiated down both lower limbs in an S1 dermatomal distribution).   Underwent removal of posterior instrumentation (screws and guadalupe).  After fusion removal, her pain continued.  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  She even sought care from a EDS specialist in [place].  She is currently on disability.     She has a h/o significant autonomic dysfunction.  Since 2014, she reports that her heart rate has fluctuated from as low as  beats per minutes and her blood pressure also fluctuates at the same time without any aggravating factors.  The fluctuation of the blood pressure and heart rate happens when she is supine, sitting, standing, walking, etc.  She has seen cardiologist Dr. Russell who advised her to continue fludrocortisone, isometric exercise, low carbohydrate and low gluten diet.     She has been seeing Dr. Singh for urinary urgency and incontinence.  She is advised for improving lifestyle, dietary modification, optimizing bowel regimen and to start pelvic physical therapy.     She has seen Dr. Moncada for left upper extremity radicular pain along the C8 nerve root distribution.  Per Dr. Moncada's note, she does not have a surgical target which may explain her symptoms.  She still continues to report this symptom.  We discussed about epidural steroid injection, however she is reluctant to perform the procedures and states she previously had a bad reaction with a steroid.   Patient also had cervical medial branch nerve block x2 with us 50% pain relief.    She will proceed to  cervical medial branch nerve radiofrequency ablation.     The patient has seen Dr. Mack for eosinophilic esophagitis, GERD, and dysphagia.    She had open hernia surgery by Dr. Mack in February 2020.  Her postoperative course was uneventful.        The patient has been seeing Dr. Noemy Nguyen for chronic migraines. Patient reports that her chronic intractable headache pain recently got worse. 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  She has undergone multiple blood patches intermittently for her headache pain.       Currently she has been  managing her pain with stable dose of Butrans 10 mcg per hour q 7 days, tramadol 50 mg every 6 hours as needed which she refills every 2-3 months.  In addition she takes tizanidine 4 mg 4 times daily as needed.    She takes sumatriptan for breakthrough migraine.  Occasionally she takes oxycodone  when her pain gets significantly worse.     8/5/2021: Patient reports increased lower back pain.  States that her pain sometimes radiates to her buttock, lateral aspect of the leg and thighs up to the ankle.  More common on the right side than the left.  Pain is in the range of 6-8 out of 10.      [place] Prescription Monitoring Program:   Reviewed. No concerns     Review of Systems:  The 14 system ROS was reviewed and was negative except what is documented above and as follows.  Any bowel or bladder problems: none  Mood: okay    Physical Exam:  Vitals:    08/05/21 0722   BP: 113/84   BP Location: Left arm   Patient Position: Chair   Cuff Size: Adult Large   Pulse: 65   SpO2: 94%       General: Awake in no apparent distress.   Eyes: Sclerae are anicteric. PERRLA, EOMI   Neck: supple, no masses.   Lungs: unlabored.   Heart: regular rate and rhythm   Abdomen: soft non tender.  Extremities: Pulses are well palpable, no peripheral edema.   Musculoskeletal: 5/5 muscle strength in all extremities.  Tenderness to palpation noted in the bilateral sacroiliac joint.   Dewey's test is positive.  Mild tenderness noted throughout the lumbar spine only on the lower lumbar area approximately at the level of L4-L5 and L5-S1.  Neurologic exam:Sensation intact throughout all dermatomes bilateral upper extremities and lower extremities  Psychiatric; Normal affect.   Skin: Warm and Dry.    Medications:  Current Outpatient Medications   Medication Sig Dispense Refill     amitriptyline (ELAVIL) 10 MG tablet Take 3 tablets (30 mg) by mouth At Bedtime 90 tablet 3     buprenorphine (BUTRANS) 10 MCG/HR WK patch APPLY 1 PATCH ONCE A WEEK 4 patch 2     cetirizine (ZYRTEC) 10 MG tablet Take 10 mg by mouth daily as needed        DULoxetine (CYMBALTA) 30 MG capsule Take 60mg in the morning and 30mg at bedtime 270 capsule 3     EPINEPHrine (EPIPEN/ADRENACLICK/OR ANY BX GENERIC EQUIV) 0.3 MG/0.3ML injection 2-pack INJECT 0.3 MLS (0.3 MG) INTO THE MUSCLE AS NEEDED FOR ANAPHYLAXIS  1     famotidine (PEPCID) 20 MG tablet Take 1 tablet (20 mg) by mouth At Bedtime 90 tablet 3     fluticasone (FLONASE) 50 MCG/ACT nasal spray Spray 2 sprays into both nostrils daily Call clinic to schedule follow up appointment. 48 mL 4     ivabradine (CORLANOR) 7.5 MG tablet Take 1 tab (7.5mg)  In AM and half tab (3.75mg) in afternoon 135 tablet 3     levothyroxine (SYNTHROID/LEVOTHROID) 50 MCG tablet Take 1 tablet (50 mcg) by mouth daily 90 tablet 3     Lidocaine (LIDOCARE) 4 % Patch Place 1-3 patches onto the skin every 24 hours To prevent lidocaine toxicity, patient should be patch free for 12 hrs daily. 20 patch 0     medical cannabis (Patient's own supply.  Not a prescription) Take 1 Dose by mouth See Admin Instructions (This is NOT a prescription, and does not certify that the patient has a qualifying medical condition for medical cannabis.  The purpose of this order is  to document that the patient reports taking medical cannabis.)     Capsule formulation - uses as needed (currently out of this and not using as of  January 28, 2020)       meloxicam (MOBIC) 7.5 MG tablet TAKE 1 TABLET (7.5 MG) BY MOUTH DAILY FOR 14 DAYS 14 tablet 0     metoclopramide (REGLAN) 10 MG tablet Take 1 tablet (10 mg) by mouth 4 times daily as needed (headache) 40 tablet 3     Multiple Vitamins-Minerals (MULTIVITAMIN PO) Take 1 tablet by mouth daily        naloxone (NARCAN) 4 MG/0.1ML nasal spray Spray 1 spray (4 mg) into one nostril alternating nostrils once as needed for opioid reversal every 2-3 minutes until assistance arrives 0.2 mL 0     naproxen sodium (ANAPROX) 220 MG tablet Take 440-660 mg by mouth daily as needed for moderate pain       omeprazole (PRILOSEC) 40 MG DR capsule Take 1 capsule (40 mg) by mouth 2 times daily as needed (prn) 180 capsule 3     ondansetron (ZOFRAN-ODT) 4 MG ODT tab Take 1-2 tablets (4-8 mg) by mouth every 12 hours as needed for nausea 60 tablet 1     oxyCODONE (ROXICODONE) 5 MG tablet Take 1 tablet (5 mg) by mouth daily as needed for severe pain 10 tablet 0     pantoprazole (PROTONIX) 40 MG EC tablet Take 1 tablet (40 mg) by mouth daily (Patient not taking: Reported on 5/20/2021) 90 tablet 3     Probiotic Product (PROBIOTIC DAILY PO) Take 2 packets by mouth every morning        propranolol ER (INDERAL LA) 120 MG 24 hr capsule Take 1 capsule (120 mg) by mouth daily 90 capsule 3     propranolol ER (INDERAL LA) 120 MG 24 hr capsule Take 1 capsule (120 mg) by mouth daily 30 capsule 11     rizatriptan (MAXALT-MLT) 5 MG ODT TAKE 1-2 TABLETS (5-10 MG) BY MOUTH AT ONSET OF HEADACHE FOR MIGRAINE (MAX 30 MG IN 24 HOURS) 18 tablet 11     SUMAtriptan (IMITREX STATDOSE) 6 MG/0.5ML pen injector kit Inject 0.5 ml (6 mg) subcutaneously at onset of migraine, May repeat in 1 hour , Max 12 mg/24 hrs 2 kit 11     tiZANidine (ZANAFLEX) 4 MG tablet Take 1 tablet (4 mg) by mouth 4 times daily as needed for muscle spasms 360 tablet 5     traMADol (ULTRAM) 50 MG tablet Take 1 tablet (50 mg) by mouth every 6 hours as needed for severe pain 60  tablet 0     vitamin B complex with vitamin C (VITAMIN  B COMPLEX) TABS tablet Take 1 tablet by mouth daily       VITAMIN D, CHOLECALCIFEROL, PO Take 2,000 Units by mouth daily       VITRON-C  MG TABS tablet TAKE 1 TABLET BY MOUTH EVERY OTHER  tablet 1       Analgesic Medications:   Medications related to Pain Management (From now, onward)    None             LABORATORY VALUES:   Recent Labs   Lab Test 11/13/20  1133 02/12/20  0611    139   POTASSIUM 4.5 3.6   CHLORIDE 102 106   CO2 29 27   ANIONGAP 4 6   GLC 96 106*   BUN 10 6*   CR 0.83 0.62   JUAN 8.6 8.0*       CBC RESULTS:   Recent Labs   Lab Test 11/13/20  1133   WBC 6.2   RBC 4.42   HGB 12.1   HCT 38.3   MCV 87   MCH 27.4   MCHC 31.6   RDW 14.6          Most Recent 3 INR's:  Recent Labs   Lab Test 01/30/20  1325 02/28/19  1045 12/17/18  0920   INR 0.93 0.97 0.90           ASSESSMENT:    1. Postlaminectomy pain syndrome  2. Intractable headache syndrome  3. Cervicalgia  4. Cervical facet arthritis  5. Lumbar degenerative disc disease  6. Lumbar facet arthropathy  7. Sacroiliac joint dysfunction-bilateral         PLAN:    1. Medications.       Continue Butrans and tramadol    Meloxicam 7.5 mg daily     2. Interventional procedures:    Recommend bilateral sacroiliac joint injection.  Risks of the procedure including bleeding, infection, increased pain, failed procedure, nerve injury, discussed with the patient.  All questions answered.  After sacroiliac joint injections, will consider lumbar medial branch nerve block and possible radiofrequency ablation to the lumbar medial branch nerves.  For radicular symptoms to the right lower extremity, discussed about nerve root block at right L5.     3. Labs and imaging: None needed for pain management.       4. Rehab:  Advised to perform home exercise using [employer] videos  [email or web address].  The patient is also encouraged to stay active as tolerated.     5.  Psychology: No current needs.     6. Integrated medicine: We discussed acupuncture therapy.  We will refer the patient for acupuncture therapy    7. Disposition: Follow up 2 weeks after the procedure.      Assessment will be ongoing with changes in treatment as indicated.  Benefits/risks/alternatives to treatment have been reviewed and the patient has been instructed to contact this office if they have any questions or concerns.  This plan of care has been discussed with the patient and the patient is in agreement.     Faiza Martinez MD, PHD

## 2021-08-05 NOTE — PATIENT INSTRUCTIONS
Medications:    Continue medications as prescribed.      *Please provide the clinic with a minium of 1 week notice, on all prescription refills.       Imaging:    XR Lumbar Bending Only 2/3 Views.      Procedures:    Call to schedule your procedure: [phone], option #2    INJECTION, SACROILIAC JOINT    Your pre-procedure instructions are below, please call our clinic if you have any questions.    Treatment Planning:    UDS and CSA completed today.      Recommended Follow up:      Follow up 2-3 weeks after procedure with Dr. Martinez or Nurse Practitioner        Please call [phone], option #1 to schedule your clinic appointment if you don't already have an appointment scheduled.      Procedure Information related to COVID-19    Please call [phone] option #2 to schedule, reschedule, or cancel your procedure appointment.   Phones are answered Monday - Friday from 08:00 - 4:30pm.  Leave a voicemail with your name, birth date, and phone number if no one is available to take your call.     You will need to be tested for COVID-19 within 4 days (96 hours) of your procedure.  You will be called to schedule your COVID test by a central scheduling team. If you have not been contacted to schedule your test within 4 days of the scheduled procedure, call [phone]    Please be aware that the turn around time for the test is approximately 24-72 hours.   If your results are still pending the day of your procedure, you will be notified as soon as possible as the procedure may be cancelled.    Please note: You will only be contacted for positive and pending results.     The procedure center staff will call you several days before the procedure to review important information that you will need to know for the day of the procedure.   Please contact the clinic if you have further questions about this information.       Information related to Scheduling and Pre-Procedure Instructions:        If you are having a Diagnostic  procedure, you will be given a Pain Diary to document your pain relief.   Please fax the completed form to our office.   fax number is [phone]    If you must reschedule your procedure more than two times, you must follow up in clinic before rescheduling again.        Preparing for your procedure    CAUTION - FAILURE TO FOLLOW THESE PRE-PROCEDURE INSTRUCTIONS WILL RESULT IN YOUR PROCEDURE BEING RESCHEDULED.      Your procedure: INJECTION, SACROILIAC JOINT      Pregnancy  If you are pregnant, or think you may be pregnant, please notify our staff. This may or may not affect the ability to perform the procedure.       You must have a  take you home after your procedure. Transportation by taxi or para-transit is okay as long as you have a responsible adult accompany you. You must provide your 's full name and contact number at time of check in.     Fasting Protocol Please have nothing to eat and drink for 2 hours before the procedure.      Medications If you take any medications, DO NOT STOP. Take your medications as usual the day of your procedure with a sip of water AT LEAST 2 HOURS PRIOR TO ARRIVAL.    Antibiotics If you are currently taking antibiotics, you must complete the entire dose 7 days prior to your scheduled procedure. You must be clear of any signs or symptoms of infection. If you begin antibiotics, please contact our clinic for instructions.     Fever, Chills, or Rash If you experience a fever of higher than 100 degrees, chills, rash, or open wounds during the one week before your procedure, please call the clinic to see if you may proceed with your procedure.      Medication Hold List  **Patients under Cardiology/Neurology care should consult their provider prior to the pain procedure to verify pre-procedure medication instructions. The information below contains general guidelines.**        Blood Thinners If you are taking daily ASPIRIN, PLAVIX, OR OTHER BLOOD THINNERS SUCH AS  COUMADIN/WARFARIN, we will need your prescribing doctor to sign a release permitting you to stop these medications. Once approved by your prescribing doctor - STOP ALL BLOOD THINNERS BASED ON THE TIME TABLE BELOW PRIOR TO YOUR PROCEDURE. If you have been instructed to stop WARFARIN(COUMADIN), you must have an INR lab drawn the day before your procedure. . Your INR must be within normal limits before we can perform your injection. MEDICATIONS CAN BE RESTARTED AFTER YOUR PROCEDURE.    14 DAY HOLD  Ticlid (ticlopidine)    10 DAY HOLD  Effient (Prasugel)    3 DAY HOLD  Xarelto (rivaroxaban) 7 DAY HOLD  Anacin, Bufferin, Ecotrin, Excedrin, Aggrenox (Aspirin)  Brilinta (ticagrelor)  Coumadin (Warfarin)  Pradexa (Dabigatran)  Elmiron (Pentosan)  Plavix (Clopidogrel Bisulfate)  Pletal (Cilostazol)    24 HOUR HOLD  Lovenox (enoxaparin)  Agrylin (Anagrelide)        Non-steroidal Anti-inflammatories (NSAIDs) DO NOT TAKE any non-steroidal anti-inflammatory medications (NSAIDs) listed on the table below. MEDICATIONS CAN BE RESTARTED AFTER YOUR PROCEDURE. Celebrex is OK to take and does not need to be discontinued.     Medications to stop:  3 DAY HOLD  Advil, Motrin (Ibuprofen)  Arthrotec (diciofenac sodium/misoprostol)  Clinoril (Sulindac)  Indocin (Indomethacin)  Lodine (Etodolac)  Toradol (Ketorolac)  Vicoprofen (Hydrocodone and Ibuprofen)  Voltaren (Diclotenac)    14 DAY HOLD  Daypro (Oxaprozin)  Feldene (Piroxicam)   7 DAY HOLD  Aleve (Naproxen sodium)  Darvon compound (contains aspirin)  Naprosyn (Naproxen)  Norgesic Forte (contains aspirin)  Mobic (Meloxicam)  Oruvall (Ketoprofen)  Percodan (contains aspirin)  Relafen (Nabumetone)  Salsalate  Trilisate  Vitamin E (more than 400 mg per day)  Any medication containing aspirin                To speak with a nurse, schedule/reschedule/cancel a clinic appointment, or request a medication refill call: [phone]     You can also reach us by MyChart:  [email or web address]

## 2021-08-05 NOTE — LETTER
8/5/2021       RE: Ada Welch  [address] [address]     Dear Colleague,    Thank you for referring your patient, Ada Welch, to the [facility] [place] at [facility]. Please see a copy of my visit note below.    [facility] : Progress Note    Date of visit: 8/5/2021    Interval history:    Ada Welch is a 50 year old female who  is well known to me for multifocal chronic pain. The patient has  complex history of low back pain for ~25 years for which she underwent L4-S1 fusion (TLIF) 12/9/13.  Postoperatively, her back pain worsened (mainly LBP, but also radiated down both lower limbs in an S1 dermatomal distribution).   Underwent removal of posterior instrumentation (screws and guadalupe).  After fusion removal, her pain continued.  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  She even sought care from a EDS specialist in [place].  She is currently on disability.     She has a h/o significant autonomic dysfunction.  Since 2014, she reports that her heart rate has fluctuated from as low as  beats per minutes and her blood pressure also fluctuates at the same time without any aggravating factors.  The fluctuation of the blood pressure and heart rate happens when she is supine, sitting, standing, walking, etc.  She has seen cardiologist Dr. Russell who advised her to continue fludrocortisone, isometric exercise, low carbohydrate and low gluten diet.     She has been seeing Dr. Singh for urinary urgency and incontinence.  She is advised for improving lifestyle, dietary modification, optimizing bowel regimen and to start pelvic physical therapy.     She has seen Dr. Moncada for left upper extremity radicular pain along the C8 nerve root distribution.  Per Dr. Moncada's note, she does not have a surgical target which may  explain her symptoms.  She still continues to report this symptom.  We discussed about epidural steroid injection, however she is reluctant to perform the procedures and states she previously had a bad reaction with a steroid.   Patient also had cervical medial branch nerve block x2 with us 50% pain relief.    She will proceed to cervical medial branch nerve radiofrequency ablation.     The patient has seen Dr. Mack for eosinophilic esophagitis, GERD, and dysphagia.    She had open hernia surgery by Dr. Mack in February 2020.  Her postoperative course was uneventful.        The patient has been seeing Dr. Noemy Nguyen for chronic migraines. Patient reports that her chronic intractable headache pain recently got worse. 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  She has undergone multiple blood patches intermittently for her headache pain.       Currently she has been  managing her pain with stable dose of Butrans 10 mcg per hour q 7 days, tramadol 50 mg every 6 hours as needed which she refills every 2-3 months.  In addition she takes tizanidine 4 mg 4 times daily as needed.    She takes sumatriptan for breakthrough migraine.  Occasionally she takes oxycodone  when her pain gets significantly worse.     8/5/2021: Patient reports increased lower back pain.  States that her pain sometimes radiates to her buttock, lateral aspect of the leg and thighs up to the ankle.  More common on the right side than the left.  Pain is in the range of 6-8 out of 10.      [place] Prescription Monitoring Program:   Reviewed. No concerns     Review of Systems:  The 14 system ROS was reviewed and was negative except what is documented above and as follows.  Any bowel or bladder problems: none  Mood: okay    Physical Exam:  Vitals:    08/05/21 0722   BP: 113/84   BP Location: Left arm   Patient Position: Chair   Cuff Size: Adult Large   Pulse: 65   SpO2: 94%       General: Awake in no apparent distress.   Eyes:  Sclerae are anicteric. PERRLA, EOMI   Neck: supple, no masses.   Lungs: unlabored.   Heart: regular rate and rhythm   Abdomen: soft non tender.  Extremities: Pulses are well palpable, no peripheral edema.   Musculoskeletal: 5/5 muscle strength in all extremities.  Tenderness to palpation noted in the bilateral sacroiliac joint.  Dewey's test is positive.  Mild tenderness noted throughout the lumbar spine only on the lower lumbar area approximately at the level of L4-L5 and L5-S1.  Neurologic exam:Sensation intact throughout all dermatomes bilateral upper extremities and lower extremities  Psychiatric; Normal affect.   Skin: Warm and Dry.    Medications:  Current Outpatient Medications   Medication Sig Dispense Refill     amitriptyline (ELAVIL) 10 MG tablet Take 3 tablets (30 mg) by mouth At Bedtime 90 tablet 3     buprenorphine (BUTRANS) 10 MCG/HR WK patch APPLY 1 PATCH ONCE A WEEK 4 patch 2     cetirizine (ZYRTEC) 10 MG tablet Take 10 mg by mouth daily as needed        DULoxetine (CYMBALTA) 30 MG capsule Take 60mg in the morning and 30mg at bedtime 270 capsule 3     EPINEPHrine (EPIPEN/ADRENACLICK/OR ANY BX GENERIC EQUIV) 0.3 MG/0.3ML injection 2-pack INJECT 0.3 MLS (0.3 MG) INTO THE MUSCLE AS NEEDED FOR ANAPHYLAXIS  1     famotidine (PEPCID) 20 MG tablet Take 1 tablet (20 mg) by mouth At Bedtime 90 tablet 3     fluticasone (FLONASE) 50 MCG/ACT nasal spray Spray 2 sprays into both nostrils daily Call clinic to schedule follow up appointment. 48 mL 4     ivabradine (CORLANOR) 7.5 MG tablet Take 1 tab (7.5mg)  In AM and half tab (3.75mg) in afternoon 135 tablet 3     levothyroxine (SYNTHROID/LEVOTHROID) 50 MCG tablet Take 1 tablet (50 mcg) by mouth daily 90 tablet 3     Lidocaine (LIDOCARE) 4 % Patch Place 1-3 patches onto the skin every 24 hours To prevent lidocaine toxicity, patient should be patch free for 12 hrs daily. 20 patch 0     medical cannabis (Patient's own supply.  Not a prescription) Take 1 Dose by  mouth See Admin Instructions (This is NOT a prescription, and does not certify that the patient has a qualifying medical condition for medical cannabis.  The purpose of this order is  to document that the patient reports taking medical cannabis.)     Capsule formulation - uses as needed (currently out of this and not using as of January 28, 2020)       meloxicam (MOBIC) 7.5 MG tablet TAKE 1 TABLET (7.5 MG) BY MOUTH DAILY FOR 14 DAYS 14 tablet 0     metoclopramide (REGLAN) 10 MG tablet Take 1 tablet (10 mg) by mouth 4 times daily as needed (headache) 40 tablet 3     Multiple Vitamins-Minerals (MULTIVITAMIN PO) Take 1 tablet by mouth daily        naloxone (NARCAN) 4 MG/0.1ML nasal spray Spray 1 spray (4 mg) into one nostril alternating nostrils once as needed for opioid reversal every 2-3 minutes until assistance arrives 0.2 mL 0     naproxen sodium (ANAPROX) 220 MG tablet Take 440-660 mg by mouth daily as needed for moderate pain       omeprazole (PRILOSEC) 40 MG DR capsule Take 1 capsule (40 mg) by mouth 2 times daily as needed (prn) 180 capsule 3     ondansetron (ZOFRAN-ODT) 4 MG ODT tab Take 1-2 tablets (4-8 mg) by mouth every 12 hours as needed for nausea 60 tablet 1     oxyCODONE (ROXICODONE) 5 MG tablet Take 1 tablet (5 mg) by mouth daily as needed for severe pain 10 tablet 0     pantoprazole (PROTONIX) 40 MG EC tablet Take 1 tablet (40 mg) by mouth daily (Patient not taking: Reported on 5/20/2021) 90 tablet 3     Probiotic Product (PROBIOTIC DAILY PO) Take 2 packets by mouth every morning        propranolol ER (INDERAL LA) 120 MG 24 hr capsule Take 1 capsule (120 mg) by mouth daily 90 capsule 3     propranolol ER (INDERAL LA) 120 MG 24 hr capsule Take 1 capsule (120 mg) by mouth daily 30 capsule 11     rizatriptan (MAXALT-MLT) 5 MG ODT TAKE 1-2 TABLETS (5-10 MG) BY MOUTH AT ONSET OF HEADACHE FOR MIGRAINE (MAX 30 MG IN 24 HOURS) 18 tablet 11     SUMAtriptan (IMITREX STATDOSE) 6 MG/0.5ML pen injector kit Inject  0.5 ml (6 mg) subcutaneously at onset of migraine, May repeat in 1 hour , Max 12 mg/24 hrs 2 kit 11     tiZANidine (ZANAFLEX) 4 MG tablet Take 1 tablet (4 mg) by mouth 4 times daily as needed for muscle spasms 360 tablet 5     traMADol (ULTRAM) 50 MG tablet Take 1 tablet (50 mg) by mouth every 6 hours as needed for severe pain 60 tablet 0     vitamin B complex with vitamin C (VITAMIN  B COMPLEX) TABS tablet Take 1 tablet by mouth daily       VITAMIN D, CHOLECALCIFEROL, PO Take 2,000 Units by mouth daily       VITRON-C  MG TABS tablet TAKE 1 TABLET BY MOUTH EVERY OTHER  tablet 1       Analgesic Medications:   Medications related to Pain Management (From now, onward)    None             LABORATORY VALUES:   Recent Labs   Lab Test 11/13/20  1133 02/12/20  0611    139   POTASSIUM 4.5 3.6   CHLORIDE 102 106   CO2 29 27   ANIONGAP 4 6   GLC 96 106*   BUN 10 6*   CR 0.83 0.62   JUAN 8.6 8.0*       CBC RESULTS:   Recent Labs   Lab Test 11/13/20  1133   WBC 6.2   RBC 4.42   HGB 12.1   HCT 38.3   MCV 87   MCH 27.4   MCHC 31.6   RDW 14.6          Most Recent 3 INR's:  Recent Labs   Lab Test 01/30/20  1325 02/28/19  1045 12/17/18  0920   INR 0.93 0.97 0.90     ASSESSMENT:    1. Postlaminectomy pain syndrome  2. Intractable headache syndrome  3. Cervicalgia  4. Cervical facet arthritis  5. Lumbar degenerative disc disease  6. Lumbar facet arthropathy  7. Sacroiliac joint dysfunction-bilateral     PLAN:    1. Medications.       Continue Butrans and tramadol    Meloxicam 7.5 mg daily     2. Interventional procedures:    Recommend bilateral sacroiliac joint injection.  Risks of the procedure including bleeding, infection, increased pain, failed procedure, nerve injury, discussed with the patient.  All questions answered.  After sacroiliac joint injections, will consider lumbar medial branch nerve block and possible radiofrequency ablation to the lumbar medial branch nerves.  For radicular symptoms to the  right lower extremity, discussed about nerve root block at right L5.     3. Labs and imaging: None needed for pain management.       4. Rehab:  Advised to perform home exercise using [employer] videos  [email or web address].  The patient is also encouraged to stay active as tolerated.     5. Psychology: No current needs.     6. Integrated medicine: We discussed acupuncture therapy.  We will refer the patient for acupuncture therapy    7. Disposition: Follow up 2 weeks after the procedure.      Assessment will be ongoing with changes in treatment as indicated.  Benefits/risks/alternatives to treatment have been reviewed and the patient has been instructed to contact this office if they have any questions or concerns.  This plan of care has been discussed with the patient and the patient is in agreement.     Faiza Martinez MD, PHD

## 2021-08-05 NOTE — NURSING NOTE
RN read through the instructions with the patient for the recommended procedure: SI Joint Injection  Patient verbalized understanding to holding appropriate medication per protocol and was agreeable to NPO policy and needing a .    Anticoagulant:None reported    Recommended Follow Up: 2-3 weeks post procedure    Skye Jernigan RN

## 2021-08-06 ENCOUNTER — HOSPITAL ENCOUNTER (OUTPATIENT)
Facility: AMBULATORY SURGERY CENTER | Age: 51
End: 2021-08-06
Attending: ANESTHESIOLOGY
Payer: COMMERCIAL

## 2021-08-06 DIAGNOSIS — M53.3 CHRONIC SACROILIAC JOINT PAIN: ICD-10-CM

## 2021-08-06 DIAGNOSIS — G89.29 CHRONIC SACROILIAC JOINT PAIN: ICD-10-CM

## 2021-08-06 DIAGNOSIS — Z11.59 ENCOUNTER FOR SCREENING FOR OTHER VIRAL DISEASES: ICD-10-CM

## 2021-08-06 ASSESSMENT — ANXIETY QUESTIONNAIRES: GAD7 TOTAL SCORE: 0

## 2021-08-07 LAB
CREATININE URINE MG/DL  (SYNCED VALUE): 74 MG/DL
N-NORTRAMADOL/CREAT UR CFM: ABNORMAL NG/MG {CREAT}
O-NORTRAMADOL UR CFM-MCNC: ABNORMAL NG/ML
TRAMADOL CTO UR CFM-MCNC: ABNORMAL NG/ML
TRAMADOL/CREAT UR: ABNORMAL NG/MG {CREAT}

## 2021-08-13 ENCOUNTER — MYC MEDICAL ADVICE (OUTPATIENT)
Dept: SURGERY | Facility: AMBULATORY SURGERY CENTER | Age: 51
End: 2021-08-13

## 2021-08-20 NOTE — TELEPHONE ENCOUNTER
NOTES Status Details   OFFICE NOTE from referring provider Internal 07.07.2021 Faiza Martinez MD   OFFICE NOTE from other specialist N/A    DISCHARGE SUMMARY from hospital N/A    DISCHARGE REPORT from the ER N/A    MEDICATION LIST Internal    LABS (Any and all labs)      Internal    Biopsy/pathology (Anything related to diagnoses I.e. fluid aspirations, lip biopsy, muscle biopsy)      N/A     N/A    Imaging (All imaging related to diagnoses)     Echo N/A    HRCT N/A    CXR n/a    EMG N/A                   Scleroderma/Dermatomyositis diagnoses     Previous Cardiology notes  N/A    Previous Pulmonary notes N/A    Previous Dermatology notes N/A    Previous GI notes N/A    Lupus diagnoses     Previous Nephrology notes N/A    Previous Dermatology notes n/a    Previous Cardiology notes N/A

## 2021-08-23 ENCOUNTER — OFFICE VISIT (OUTPATIENT)
Dept: RHEUMATOLOGY | Facility: CLINIC | Age: 51
End: 2021-08-23
Attending: ANESTHESIOLOGY
Payer: COMMERCIAL

## 2021-08-23 ENCOUNTER — PRE VISIT (OUTPATIENT)
Dept: RHEUMATOLOGY | Facility: CLINIC | Age: 51
End: 2021-08-23

## 2021-08-23 ENCOUNTER — LAB (OUTPATIENT)
Dept: LAB | Facility: CLINIC | Age: 51
End: 2021-08-23

## 2021-08-23 VITALS
DIASTOLIC BLOOD PRESSURE: 91 MMHG | WEIGHT: 191.8 LBS | OXYGEN SATURATION: 99 % | SYSTOLIC BLOOD PRESSURE: 135 MMHG | HEART RATE: 62 BPM | BODY MASS INDEX: 30.04 KG/M2

## 2021-08-23 DIAGNOSIS — Z11.59 ENCOUNTER FOR SCREENING FOR OTHER VIRAL DISEASES: ICD-10-CM

## 2021-08-23 DIAGNOSIS — M25.50 POLYARTHRALGIA: ICD-10-CM

## 2021-08-23 PROCEDURE — 99215 OFFICE O/P EST HI 40 MIN: CPT | Performed by: INTERNAL MEDICINE

## 2021-08-23 PROCEDURE — U0003 INFECTIOUS AGENT DETECTION BY NUCLEIC ACID (DNA OR RNA); SEVERE ACUTE RESPIRATORY SYNDROME CORONAVIRUS 2 (SARS-COV-2) (CORONAVIRUS DISEASE [COVID-19]), AMPLIFIED PROBE TECHNIQUE, MAKING USE OF HIGH THROUGHPUT TECHNOLOGIES AS DESCRIBED BY CMS-2020-01-R: HCPCS | Performed by: ANESTHESIOLOGY

## 2021-08-23 PROCEDURE — G0463 HOSPITAL OUTPT CLINIC VISIT: HCPCS

## 2021-08-23 NOTE — NURSING NOTE
Chief Complaint   Patient presents with     New Patient     possible RA         BP (!) 135/91 (BP Location: Right arm, Patient Position: Sitting, Cuff Size: Adult Regular)   Pulse 62   Wt 87 kg (191 lb 12.8 oz)   LMP  (LMP Unknown)   SpO2 99%   BMI 30.04 kg/m          Patient endorses pain everywhere.    Jean Marie Patino, EMT

## 2021-08-23 NOTE — PROGRESS NOTES
Rheumatology consultation note    CC: Follow-up for EDS and DJD of the spine    HPI: Ada is 50 years old and she was last seen by my colleague Dr. Shipley in 2018.  At that time he felt he did not have much to offer for her EDS or osteoarthritis.  Today she is here to follow-up as she still has chronic pain that limits her activities and she wonders if there are still other medications that can be tried for her condition.  She is on a number of different pain medications already, which helps somewhat but she still has pain.  She recently moved to a new location and she is now in the process of unpacking all the boxes.  Clearly she feels that the move took a heavy toll on her.  She has significant numbness and tingling in her legs but also in her arms and she has to constantly move because sitting in 1 position too long gets more painful.  Similarly at night she has difficulty being in 1 position too long and having to move at night disrupts her sleep.    I have reviewed with her all her medications as listed in epic.    Past medical history includes EDS, degenerative spine disease both in the cervical spine as well as the lumbar spine, cervical and lumbar radiculopathy, osteoarthritis in the shoulders right more than left, hypothyroidism, POTS.    Social history, she does not smoke, lives with her  in a new home.    Family history there is no rheumatoid arthritis or lupus in the family.    I have reviewed with her her x-rays of the shoulders, pelvis, cervical and lumbar spine.    Physical examination  Her vital signs shows a blood pressure of 135/91 and a BMI of 30  Joint examination shows that she has normal range of motion of her hands wrists elbows and shoulders.  There is no active synovitis.  Lower extremity exam also shows no active synovitis in the knees ankles and feet.  There is no skin rash.  Her gait is normal.    Impression/plan  1.  She has a prior diagnosis of EDS.  There is no treatment for  EDS.  Some of her pain may be due to EDS, but some of her pain may be due to her degenerative spine disease.  2.  She has DJD of the cervical and lumbar spine and she is status post lumbar surgery.  She has radicular pain with numbness and tingling in the upper and lower extremities.  I have very little to offer her in this regard, but frequent repositioning seems reasonable, as too long in one position is painful whether that is sitting, standing, or in bed.  3.  The best exercise for her would be water exercise or swimming, and I recommend that she finds a pool close to her new home.  She could exercise twice a week that would be beneficial to her muscle strengthening.  I am very happy to hear that she goes for daily walks with her dog.  4.  With regard to her chronic pain treatment I have very little to add as she is on several medications already.  5.  She has mild osteoarthritis of the shoulders.  6.  We tentatively scheduled a follow-up visit in 1 years time, but he is welcome to come in earlier if needed.

## 2021-08-23 NOTE — LETTER
8/23/2021       RE: Ada Welch  [address] [facility] [address]     Dear Colleague,    Thank you for referring your patient, Ada Welch, to the [facility] RHEUMATOLOGY [facility]INIC [facility]. Please see a copy of my visit note below.    Rheumatology consultation note    CC: Follow-up for EDS and DJD of the spine    HPI: Ada is 50 years old and she was last seen by my colleague Dr. Shipley in 2018.  At that time he felt he did not have much to offer for her EDS or osteoarthritis.  Today she is here to follow-up as she still has chronic pain that limits her activities and she wonders if there are still other medications that can be tried for her condition.  She is on a number of different pain medications already, which helps somewhat but she still has pain.  She recently moved to a new location and she is now in the process of unpacking all the boxes.  Clearly she feels that the move took a heavy toll on her.  She has significant numbness and tingling in her legs but also in her arms and she has to constantly move because sitting in 1 position too long gets more painful.  Similarly at night she has difficulty being in 1 position too long and having to move at night disrupts her sleep.    I have reviewed with her all her medications as listed in epic.    Past medical history includes EDS, degenerative spine disease both in the cervical spine as well as the lumbar spine, cervical and lumbar radiculopathy, osteoarthritis in the shoulders right more than left, hypothyroidism, POTS.    Social history, she does not smoke, lives with her  in a new home.    Family history there is no rheumatoid arthritis or lupus in the family.    I have reviewed with her her x-rays of the shoulders, pelvis, cervical and lumbar spine.    Physical examination  Her vital signs shows a blood pressure of 135/91 and a BMI of 30  Joint examination shows that  she has normal range of motion of her hands wrists elbows and shoulders.  There is no active synovitis.  Lower extremity exam also shows no active synovitis in the knees ankles and feet.  There is no skin rash.  Her gait is normal.    Impression/plan  1.  She has a prior diagnosis of EDS.  There is no treatment for EDS.  Some of her pain may be due to EDS, but some of her pain may be due to her degenerative spine disease.  2.  She has DJD of the cervical and lumbar spine and she is status post lumbar surgery.  She has radicular pain with numbness and tingling in the upper and lower extremities.  I have very little to offer her in this regard, but frequent repositioning seems reasonable, as too long in one position is painful whether that is sitting, standing, or in bed.  3.  The best exercise for her would be water exercise or swimming, and I recommend that she finds a pool close to her new home.  She could exercise twice a week that would be beneficial to her muscle strengthening.  I am very happy to hear that she goes for daily walks with her dog.  4.  With regard to her chronic pain treatment I have very little to add as she is on several medications already.  5.  She has mild osteoarthritis of the shoulders.  6.  We tentatively scheduled a follow-up visit in 1 years time, but he is welcome to come in earlier if needed.        Pablo Walden MD

## 2021-08-24 LAB — SARS-COV-2 RNA RESP QL NAA+PROBE: POSITIVE

## 2021-08-27 ENCOUNTER — MYC MEDICAL ADVICE (OUTPATIENT)
Dept: ANESTHESIOLOGY | Facility: CLINIC | Age: 51
End: 2021-08-27

## 2021-08-28 DIAGNOSIS — M96.1 POSTLAMINECTOMY SYNDROME: ICD-10-CM

## 2021-08-30 RX ORDER — TRAMADOL HYDROCHLORIDE 50 MG/1
TABLET ORAL
Qty: 60 TABLET | Refills: 0 | Status: SHIPPED | OUTPATIENT
Start: 2021-08-30 | End: 2021-09-16

## 2021-08-30 NOTE — TELEPHONE ENCOUNTER
Refill request routed to the clinic on 8/27/21 via [facility].     Medication: traMADol (ULTRAM) 50 MG tablet      MNPMP Checked: Yes    Last refilled 8/3/21 for 60 tablets      Last clinic appointment: 8/5/21  Next clinic appointment: 9/16/21    Last Drug Screen Collected:  8/5/21  Controlled Substance Agreement signed: 8/5/21      Preferred pharmacy:    [address]      Refill encounter routed to the provider on 8/30/21.    Mya Cash LPN

## 2021-09-03 ENCOUNTER — MYC MEDICAL ADVICE (OUTPATIENT)
Dept: ANESTHESIOLOGY | Facility: CLINIC | Age: 51
End: 2021-09-03

## 2021-09-03 DIAGNOSIS — M96.1 POSTLAMINECTOMY SYNDROME: ICD-10-CM

## 2021-09-03 DIAGNOSIS — G89.4 CHRONIC PAIN SYNDROME: ICD-10-CM

## 2021-09-03 RX ORDER — BUPRENORPHINE 10 UG/H
PATCH TRANSDERMAL
Qty: 4 PATCH | Refills: 2 | Status: SHIPPED | OUTPATIENT
Start: 2021-09-03 | End: 2021-09-16

## 2021-09-14 ENCOUNTER — TELEPHONE (OUTPATIENT)
Dept: GASTROENTEROLOGY | Facility: CLINIC | Age: 51
End: 2021-09-14

## 2021-09-14 ENCOUNTER — MYC MEDICAL ADVICE (OUTPATIENT)
Dept: INTERNAL MEDICINE | Facility: CLINIC | Age: 51
End: 2021-09-14

## 2021-09-14 NOTE — TELEPHONE ENCOUNTER
Left message with Gastroenterology clinic number to schedule a 6 month follow up from 4/21/21 with Мария Hallman per Dr Ruvalcaba.

## 2021-09-16 ENCOUNTER — VIRTUAL VISIT (OUTPATIENT)
Dept: ANESTHESIOLOGY | Facility: CLINIC | Age: 51
End: 2021-09-16
Payer: COMMERCIAL

## 2021-09-16 ENCOUNTER — TELEPHONE (OUTPATIENT)
Dept: ANESTHESIOLOGY | Facility: CLINIC | Age: 51
End: 2021-09-16

## 2021-09-16 DIAGNOSIS — G89.29 CHRONIC SI JOINT PAIN: Primary | ICD-10-CM

## 2021-09-16 DIAGNOSIS — M19.011 BILATERAL ACROMIOCLAVICULAR JOINT ARTHRITIS: Primary | ICD-10-CM

## 2021-09-16 DIAGNOSIS — M79.2 NEUROPATHIC PAIN: ICD-10-CM

## 2021-09-16 DIAGNOSIS — M96.1 LUMBAR POST-LAMINECTOMY SYNDROME: ICD-10-CM

## 2021-09-16 DIAGNOSIS — G89.4 CHRONIC PAIN SYNDROME: ICD-10-CM

## 2021-09-16 DIAGNOSIS — M53.3 CHRONIC SI JOINT PAIN: Primary | ICD-10-CM

## 2021-09-16 DIAGNOSIS — M19.012 BILATERAL ACROMIOCLAVICULAR JOINT ARTHRITIS: Primary | ICD-10-CM

## 2021-09-16 DIAGNOSIS — M96.1 POSTLAMINECTOMY SYNDROME: ICD-10-CM

## 2021-09-16 PROCEDURE — 99214 OFFICE O/P EST MOD 30 MIN: CPT | Mod: 95 | Performed by: ANESTHESIOLOGY

## 2021-09-16 RX ORDER — DULOXETIN HYDROCHLORIDE 30 MG/1
CAPSULE, DELAYED RELEASE ORAL
Qty: 270 CAPSULE | Refills: 3 | Status: SHIPPED | OUTPATIENT
Start: 2021-09-16 | End: 2022-03-11

## 2021-09-16 RX ORDER — BUPRENORPHINE 10 UG/H
PATCH TRANSDERMAL
Qty: 4 PATCH | Refills: 2 | Status: SHIPPED | OUTPATIENT
Start: 2021-09-16 | End: 2022-06-14

## 2021-09-16 RX ORDER — AMITRIPTYLINE HYDROCHLORIDE 10 MG/1
30 TABLET ORAL AT BEDTIME
Qty: 90 TABLET | Refills: 3 | Status: SHIPPED | OUTPATIENT
Start: 2021-09-16 | End: 2021-10-15

## 2021-09-16 RX ORDER — TRAMADOL HYDROCHLORIDE 50 MG/1
TABLET ORAL
Qty: 60 TABLET | Refills: 0 | Status: SHIPPED | OUTPATIENT
Start: 2021-09-16 | End: 2021-12-24

## 2021-09-16 ASSESSMENT — PAIN SCALES - GENERAL: PAINLEVEL: SEVERE PAIN (6)

## 2021-09-16 NOTE — PROGRESS NOTES
Video call duration: 20 minutes    Ada Welch is a 50 year old female who  is well known to me for multifocal chronic pain. The patient has  complex history of low back pain for ~25 years for which she underwent L4-S1 fusion (TLIF) 12/9/13.  Postoperatively, her back pain worsened (mainly LBP, but also radiated down both lower limbs in an S1 dermatomal distribution).   Underwent removal of posterior instrumentation (screws and guadalupe).  After fusion removal, her pain continued.  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  She even sought care from a EDS specialist in [place].  She is currently on disability.     She has a h/o significant autonomic dysfunction.  Since 2014, she reports that her heart rate has fluctuated from as low as  beats per minutes and her blood pressure also fluctuates at the same time without any aggravating factors.  The fluctuation of the blood pressure and heart rate happens when she is supine, sitting, standing, walking, etc.  She has seen cardiologist Dr. Russell who advised her to continue fludrocortisone, isometric exercise, low carbohydrate and low gluten diet.     She has been seeing Dr. Singh for urinary urgency and incontinence.  She is advised for improving lifestyle, dietary modification, optimizing bowel regimen and to start pelvic physical therapy.     She has seen Dr. Moncada for left upper extremity radicular pain along the C8 nerve root distribution.  Per Dr. Moncada's note, she does not have a surgical target which may explain her symptoms.  She still continues to report this symptom.  We discussed about epidural steroid injection, however she is reluctant to perform the procedures and states she previously had a bad reaction with a steroid.   Patient also had cervical medial branch nerve block x2 with us 50% pain relief.    She will proceed to cervical medial branch nerve radiofrequency ablation.     The patient has seen Dr. Mack for eosinophilic esophagitis,  GERD, and dysphagia.    She had open hernia surgery by Dr. Mack in 2020.  Her postoperative course was uneventful.        The patient has been seeing Dr. Noemy Nguyen for chronic migraines. Patient reports that her chronic intractable headache pain recently got worse. 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  She has undergone blood patches intermittently for her headache pain.       Currently she has been  managing her pain with stable dose of Butrans 10 mcg per hour q 7 days, tramadol 50 mg every 6 hours as needed which she refills every 2-3 months.  In addition she takes tizanidine 4 mg 4 times daily as needed.    She takes sumatriptan for breakthrough migraine.  Occasionally she takes oxycodone  when her pain gets significantly worse.     Today her main complaints are followin.  Bilateral shoulder pain (shoulder x-ray shows mild AC joint arthritis)  2.  Bilateral lower back pain (we recommended bilateral SI joint injection, which was not performed due to her Covid infection)  3.  Chronic neck pain secondary to cervical facet arthropathy (cervical medial branch block was performed, however she reported only 50% pain relief, therefore medial branch nerve radiofrequency ablation was not performed).        Diagnosis  Postlaminectomy pain syndrome  Intractable headache syndrome  Cervicalgia  Cervical facet arthritis  Lumbar degenerative disc disease  Bilateral shoulder pain due to his AC joint arthritis  Sacroiliac joint dysfunction        Assessment and plan    Continue Butrans, tramadol    Ordered physical therapy of the right shoulder    Continue amitriptyline 30 mg nightly    Duloxetine 60 mg every morning and 30 mg nightly    Ordered bilateral sacroiliac joint injection.  Risk of the procedure including bleeding, infection, failed procedure, nerve injury, discussed with the patient.  All questions answered.  Will check with her insurance about cervical medial branch nerve  radiofrequency ablation.  She had 50% pain relief with cervical medial branch nerve block.  Also discussed about SI joint injection and suprascapular nerve block for shoulder pain.    Follow-up in 6 months.        Faiza Martinez MD, PhD     [facility]NAG[facility] [address] [id number] [phone]  Dept: [phone]

## 2021-09-16 NOTE — PATIENT INSTRUCTIONS
Medications:    amitriptyline (ELAVIL) 10 MG tablet- Take 3 tablets (30 mg) by mouth At Bedtime.    buprenorphine (BUTRANS) 10 MCG/HR WK patch- APPLY 1 PATCH ONCE A WEEK.    DULoxetine (CYMBALTA) 30 MG capsule-  Take 60mg in the morning and 30mg at bedtime.    traMADol (ULTRAM) 50 MG tablet- TAKE 1 TABLET BY MOUTH EVERY 6 HOURS AS NEEDED FOR SEVERE PAIN.    For refills of opioid medications - You MUST call (Or MyChart) the clinic DIRECTLY and at least 7 days before you run out of your medication.      Referrals:    Physical Therapy Referral placed-   If you have not heard from the scheduling office within 2 business days, please call [phone] for all locations    Recommended Follow up:      Within 3 months with Dr. Martinez.        To speak with a nurse, schedule/reschedule/cancel a clinic appointment, or request a medication refill call: [phone], option #1.    You can also reach us by [employer]: [email or web address]

## 2021-09-16 NOTE — PROGRESS NOTES
Ada is a 50 year old who is being evaluated via a billable video visit.      How would you like to obtain your AVS? MyChart  If the video visit is dropped, the invitation should be resent by: Text to cell phone: [phone]  Will anyone else be joining your video visit? No

## 2021-09-16 NOTE — LETTER
9/16/2021       RE: Ada Welch  [address] [facility] [address]     Dear Colleague,    Thank you for referring your patient, Ada Welch, to the [facility] FOR COMPREHENSIVE PAIN MANAGEMENT [place] at [facility]. Please see a copy of my visit note below.    Ada Welch is a 50 year old female who  is well known to me for multifocal chronic pain. The patient has  complex history of low back pain for ~25 years for which she underwent L4-S1 fusion (TLIF) 12/9/13.  Postoperatively, her back pain worsened (mainly LBP, but also radiated down both lower limbs in an S1 dermatomal distribution).   Underwent removal of posterior instrumentation (screws and guadalupe).  After fusion removal, her pain continued.  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  She even sought care from a EDS specialist in [place].  She is currently on disability.     She has a h/o significant autonomic dysfunction.  Since 2014, she reports that her heart rate has fluctuated from as low as  beats per minutes and her blood pressure also fluctuates at the same time without any aggravating factors.  The fluctuation of the blood pressure and heart rate happens when she is supine, sitting, standing, walking, etc.  She has seen cardiologist Dr. Russell who advised her to continue fludrocortisone, isometric exercise, low carbohydrate and low gluten diet.     She has been seeing Dr. Singh for urinary urgency and incontinence.  She is advised for improving lifestyle, dietary modification, optimizing bowel regimen and to start pelvic physical therapy.     She has seen Dr. Moncada for left upper extremity radicular pain along the C8 nerve root distribution.  Per Dr. Moncada's note, she does not have a surgical target which may explain her symptoms.  She still continues to report this symptom.  We discussed about epidural steroid injection, however she is reluctant to  perform the procedures and states she previously had a bad reaction with a steroid.   Patient also had cervical medial branch nerve block x2 with us 50% pain relief.    She will proceed to cervical medial branch nerve radiofrequency ablation.     The patient has seen Dr. Mack for eosinophilic esophagitis, GERD, and dysphagia.    She had open hernia surgery by Dr. Mack in 2020.  Her postoperative course was uneventful.        The patient has been seeing Dr. Noemy Nguyen for chronic migraines. Patient reports that her chronic intractable headache pain recently got worse. 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  She has undergone blood patches intermittently for her headache pain.       Currently she has been  managing her pain with stable dose of Butrans 10 mcg per hour q 7 days, tramadol 50 mg every 6 hours as needed which she refills every 2-3 months.  In addition she takes tizanidine 4 mg 4 times daily as needed.    She takes sumatriptan for breakthrough migraine.  Occasionally she takes oxycodone  when her pain gets significantly worse.     Today her main complaints are followin.  Bilateral shoulder pain (shoulder x-ray shows mild AC joint arthritis)  2.  Bilateral lower back pain (we recommended bilateral SI joint injection, which was not performed due to her Covid infection)  3.  Chronic neck pain secondary to cervical facet arthropathy (cervical medial branch block was performed, however she reported only 50% pain relief, therefore medial branch nerve radiofrequency ablation was not performed).        Diagnosis  Postlaminectomy pain syndrome  Intractable headache syndrome  Cervicalgia  Cervical facet arthritis  Lumbar degenerative disc disease  Bilateral shoulder pain due to his AC joint arthritis  Sacroiliac joint dysfunction        Assessment and plan    Continue Butrans, tramadol    Ordered physical therapy of the right shoulder    Continue amitriptyline 30 mg  nightly    Duloxetine 60 mg every morning and 30 mg nightly    Ordered bilateral sacroiliac joint injection.  Risk of the procedure including bleeding, infection, failed procedure, nerve injury, discussed with the patient.  All questions answered.  Will check with her insurance about cervical medial branch nerve radiofrequency ablation.  She had 50% pain relief with cervical medial branch nerve block.  Also discussed about SI joint injection and suprascapular nerve block for shoulder pain.    Follow-up in 6 months.        Faiza Martinez MD, PhD           Again, thank you for allowing me to participate in the care of your patient.      Sincerely,    Faiza Martinez MD

## 2021-09-22 DIAGNOSIS — Z11.59 ENCOUNTER FOR SCREENING FOR OTHER VIRAL DISEASES: ICD-10-CM

## 2021-10-13 ENCOUNTER — TELEPHONE (OUTPATIENT)
Dept: ANESTHESIOLOGY | Facility: CLINIC | Age: 51
End: 2021-10-13

## 2021-10-13 NOTE — TELEPHONE ENCOUNTER
Attempt # 1    I called Ada to reschedule a procedure with Dr. Martinez. Left a voicemail with my call back number and request that they leave a good call back time if they get my voicemail.    Jolanta MONSON  Asya-Op Coordinator

## 2021-10-14 ENCOUNTER — HOSPITAL ENCOUNTER (OUTPATIENT)
Facility: AMBULATORY SURGERY CENTER | Age: 51
End: 2021-10-14
Attending: ANESTHESIOLOGY
Payer: COMMERCIAL

## 2021-10-15 ENCOUNTER — VIRTUAL VISIT (OUTPATIENT)
Dept: PHARMACY | Facility: CLINIC | Age: 51
End: 2021-10-15
Payer: COMMERCIAL

## 2021-10-15 DIAGNOSIS — Z78.9 TAKES DIETARY SUPPLEMENTS: ICD-10-CM

## 2021-10-15 DIAGNOSIS — M25.512 CHRONIC PAIN OF BOTH SHOULDERS: ICD-10-CM

## 2021-10-15 DIAGNOSIS — G90.9 AUTONOMIC DYSFUNCTION: ICD-10-CM

## 2021-10-15 DIAGNOSIS — G89.29 CHRONIC PAIN OF BOTH SHOULDERS: ICD-10-CM

## 2021-10-15 DIAGNOSIS — G89.29 CHRONIC INTRACTABLE HEADACHE, UNSPECIFIED HEADACHE TYPE: Primary | ICD-10-CM

## 2021-10-15 DIAGNOSIS — J30.2 SEASONAL ALLERGIC RHINITIS, UNSPECIFIED TRIGGER: ICD-10-CM

## 2021-10-15 DIAGNOSIS — R51.9 CHRONIC INTRACTABLE HEADACHE, UNSPECIFIED HEADACHE TYPE: Primary | ICD-10-CM

## 2021-10-15 DIAGNOSIS — E03.9 ACQUIRED HYPOTHYROIDISM: ICD-10-CM

## 2021-10-15 DIAGNOSIS — G47.00 INSOMNIA, UNSPECIFIED TYPE: ICD-10-CM

## 2021-10-15 DIAGNOSIS — M25.511 CHRONIC PAIN OF BOTH SHOULDERS: ICD-10-CM

## 2021-10-15 PROCEDURE — 99607 MTMS BY PHARM ADDL 15 MIN: CPT | Performed by: PHARMACIST

## 2021-10-15 PROCEDURE — 99605 MTMS BY PHARM NP 15 MIN: CPT | Performed by: PHARMACIST

## 2021-10-15 RX ORDER — UBIDECARENONE 100 MG
100 CAPSULE ORAL DAILY
COMMUNITY

## 2021-10-15 RX ORDER — SOY ISOFLAV/BCOHOSH/CISSUS QUA 198 MG
1 CAPSULE ORAL
COMMUNITY
End: 2023-09-26

## 2021-10-15 RX ORDER — ACETAMINOPHEN 500 MG
1000 TABLET ORAL EVERY 6 HOURS PRN
COMMUNITY

## 2021-10-15 NOTE — Clinical Note
Ada Olivia had issues with chronic fatigue prior to 10 days ago. She was without most of her medications for 10 days and was wondering what to do about this. Please see my note for specifics, but we decided to stay off the amitryptiline as her fatigue symptoms have resolved from not taaking her medications. We will restart some of the medications for pain and migraines one by one to make sure they are not causing burdensome side effects.    Please let me know if you have any questions,    Kirk Burger, Pharm. D.   Medication Therapy Management Pharmacist  Direct Voicemail: [phone]

## 2021-10-15 NOTE — PROGRESS NOTES
Medication Therapy Management (MTM) Encounter    ASSESSMENT:                            Medication Adherence/Access: See below for considerations    She is very interested in pharmacogenomics and I will look into getting this set up.    Sleep: Since she hasn't noticed any difference in sleep she should remain off this medication. It could have been contributing to her significant fatigue.     Pain: Due to increased pain she would likely benefit from resuming the duloxetine at 30 mg and increasing by 30 mg each week until she is on her previous dose and report if side effects occur.    Allergies: Stable.     Mast cell Syndrome: Stable.     Migraines: We will focus on re-initiating the propranolol with close blood pressure and heart rate monitoring. This medication also could have contributed to her significant fatigue.    Hypothyroidism: She would benefit from restarting her thyroid medication at the previous dose and to separate it from food by 30 minutes and from any iron or calcium containing supplements by 4 hours.    Supplements: Stable.     PLAN:                            1. I will let Dr. Rivera know that you will not be continuing amitryptiline.  2. Start taking the duloxetine 30 mg for one week and increase by 30 mg each week until at your previous dose. Monitor for effectiveness and side effects.  3. You can take up to 3000 mg of Tylenol per day for your pain.  4. Restart the levothyroxine 50 mcg daily and take it 30-60 minutes before food and not within 4 hours of calcium or iron supplements.  5. Im going to look into pharmacogenomics testing for you and get back to you via [employer].    Follow-up: 1 month      SUBJECTIVE/OBJECTIVE:                          Ada Welch is a 50 year old female called for an initial visit. She was self-referred.      Reason for visit: Comprehensive Medication Review.    Allergies/ADRs: Reviewed in chart  Past Medical History: Reviewed in chart  Tobacco: She reports that  "she quit smoking about 7 years ago. Her smoking use included cigarettes. She started smoking about 30 years ago. She has a 2.00 pack-year smoking history. She has never used smokeless tobacco.      Medication Adherence/Access: She has accidentally went without most of her medications for a 10 days and is wondering which medications she should restart. The patient has not been having chronic fatigue (which was a previous problem) since stopping all of her medications. She reports she has started getting headaches again. She didn't notice what was withdrawal because she was also sick with an illness.    Sleep: She uses amitriptyline 30 mg at bedtime. She went without this for a week and hasn't noticed any difference in sleep. She is potentially interested in staying off this. We discussed that this could have also been helpful for pain.    Pain: She was taking the buprenorphine 10 mcg patch per week \"that one makes a huge difference\" and she was never out of this medication. Before stopping all medications she would be between a 5-6/10 for pain and now she is 7-8/10 and makes it hard to move. She has also been without duloxetine but is interested in restarting that at this time. She takes tramadol 50 mg 1 in the morning and 1 at night and does notice that  She \"thinks it helps.\" She take tizanidine 4 mg four times daily. She feels like it makes her initially sleep and then within an hour that gets better.She also takes acetaminophen 1000 mg 1-2 times a day, lidocaine 4% patches, medical cannibas.     Allergies: She takes cetirizine 10 mg in seasons where she has symptoms and Flonase 2 sprays into her nostrils daily. This works well for her. Also has an Epipen as needed for bee stings.    Mast cell Syndrome: She has been taking the pantoprazole 40 mg daily and famotidine 20 mg at night for GI symptoms. She reports she has been good over the last week. For nausea she takes metoclopramide 10 mg  4 times a day as needed " and this helps with nausea which she take sometimes everyday and sometimes goes weeks without it. She also takes ivabradine 7.5 mg in the morning and 3.75 mg in the evening for related symptoms as well.    Migraines: She has been having visual auras and confusion without pain recently. She was taking propranolol  mg daily for migraine prevention. She has noticed more headaches since coming off of this. She states that sometimes she has low blood pressure and heart rate. She has rizatriptan 5 mg of rizatriptan to abort headaches and takes sumatriptan 6g subcutaneously if rizatriptan doesn't work. Hasn't needed to use sumatriptan in a few years.     Hypothyroidism: Patient is taking levothyroxine 50 mcg daily. Patient is having the following symptoms: hypothyroidism -  cold intolerance. She has been without this for a week.  TSH   Date Value Ref Range Status   11/13/2020 3.00 0.40 - 4.00 mU/L Final       Supplements: She takes vitron-C  mg, vitamin D 2000 international unit(s) daily, a vitamin B complex daily (she takes this sporadically), she takes a probiotic and a prebiotic, a multivitamin, a CoQ10 100 mg daily, and estroven 4 mg daily. No questions or concerns with her supplements.    Today's Vitals: LMP  (LMP Unknown)   ----------------      I spent 60 minutes with this patient today. All changes were made via collaborative practice agreement with Ellyn Rivera MD. A copy of the visit note was provided to the patient's primary care provider.    The patient was sent via [employer] a summary of these recommendations.     Kirk Burger, Pharm. D.   Medication Therapy Management Pharmacist  Direct Voicemail: [phone]    Telemedicine Visit Details  Type of service:  Telephone visit  Start Time: 2:00 pm  End Time: 3:00 PM  Originating Location (patient location): Home  Distant Location (provider location):  [facility]     Medication Therapy Recommendations  Acquired hypothyroidism     Current Medication: levothyroxine (SYNTHROID/LEVOTHROID) 50 MCG tablet   Rationale: Untreated condition - Needs additional medication therapy - Indication   Recommendation: Start Medication   Status: Accepted per CPA         Insomnia, unspecified type    Current Medication: amitriptyline (ELAVIL) 10 MG tablet (Discontinued)   Rationale: Undesirable effect - Adverse medication event - Safety   Recommendation: Discontinue Medication   Status: Accepted per CPA         Pain disorder associated with psychological and physical factors    Current Medication: DULoxetine (CYMBALTA) 30 MG capsule   Rationale: Does not understand instructions - Adherence - Adherence   Recommendation: Provide Adherence Intervention   Status: Patient Agreed - Adherence/Education          Current Medication: acetaminophen (TYLENOL) 500 MG tablet   Rationale: Dose too low - Dosage too low - Effectiveness   Recommendation: Increase Frequency   Status: Patient Agreed - Adherence/Education

## 2021-10-15 NOTE — PATIENT INSTRUCTIONS
Recommendations from today's MTM visit:                                                    MTM (medication therapy management) is a service provided by a clinical pharmacist designed to help you get the most of out of your medicines.   Today we reviewed what your medicines are for, how to know if they are working, that your medicines are safe and how to make your medicine regimen as easy as possible.      1. I will let Dr. Rivera know that you will not be continuing amitryptiline.  2. Start taking the duloxetine 30 mg for one week and increase by 30 mg each week until at your previous dose. Monitor for effectiveness and side effects.  3. You can take up to 3000 mg of Tylenol per day for your pain.  4. Restart the levothyroxine 50 mcg daily and take it 30-60 minutes before food and not within 4 hours of calcium or iron supplements.  5. Im going to look into pharmacogenomics testing for you and get back to you via [employer].    Follow-up: 1 month    It was great to speak with you today.  I value your experience and would be very thankful for your time with providing feedback on our clinic survey. You may receive a survey via email or text message in the next few days.     To schedule another MTM appointment, please call the clinic directly or you may call the MTM scheduling line at [phone] or toll-free at [phone].     My Clinical Pharmacist's contact information:                                                      Please feel free to contact me with any questions or concerns you have.      Kirk Burger, Pharm. D.   Medication Therapy Management Pharmacist  Direct Voicemail: [phone]

## 2021-10-21 DIAGNOSIS — F41.9 ANXIETY DISORDER, UNSPECIFIED TYPE: Primary | ICD-10-CM

## 2021-10-21 NOTE — TELEPHONE ENCOUNTER
Attempt # 2    I called Ada to schedule a procedure with Dr. Martinez. Left a voicemail with my call back number and request that they leave a good call back time if they get my voicemail.    Jolanta MONSON  Asya-Op Coordinator

## 2021-11-11 ENCOUNTER — HOSPITAL ENCOUNTER (OUTPATIENT)
Facility: AMBULATORY SURGERY CENTER | Age: 51
End: 2021-11-11
Attending: ANESTHESIOLOGY
Payer: COMMERCIAL

## 2021-11-29 ENCOUNTER — OFFICE VISIT (OUTPATIENT)
Dept: INTERNAL MEDICINE | Facility: CLINIC | Age: 51
End: 2021-11-29
Payer: COMMERCIAL

## 2021-11-29 VITALS
WEIGHT: 186.3 LBS | DIASTOLIC BLOOD PRESSURE: 85 MMHG | HEIGHT: 67 IN | BODY MASS INDEX: 29.24 KG/M2 | HEART RATE: 72 BPM | OXYGEN SATURATION: 96 % | SYSTOLIC BLOOD PRESSURE: 137 MMHG

## 2021-11-29 DIAGNOSIS — G89.29 CHRONIC LEFT SHOULDER PAIN: ICD-10-CM

## 2021-11-29 DIAGNOSIS — G89.29 CHRONIC NECK PAIN: ICD-10-CM

## 2021-11-29 DIAGNOSIS — M54.2 CHRONIC NECK PAIN: ICD-10-CM

## 2021-11-29 DIAGNOSIS — M25.512 CHRONIC LEFT SHOULDER PAIN: ICD-10-CM

## 2021-11-29 DIAGNOSIS — M96.1 LUMBAR POST-LAMINECTOMY SYNDROME: ICD-10-CM

## 2021-11-29 DIAGNOSIS — Z23 NEED FOR VACCINATION: Primary | ICD-10-CM

## 2021-11-29 DIAGNOSIS — Z23 NEED FOR INFLUENZA VACCINATION: ICD-10-CM

## 2021-11-29 DIAGNOSIS — Z00.00 ROUTINE GENERAL MEDICAL EXAMINATION AT A HEALTH CARE FACILITY: ICD-10-CM

## 2021-11-29 DIAGNOSIS — Z12.31 ENCOUNTER FOR SCREENING MAMMOGRAM FOR BREAST CANCER: ICD-10-CM

## 2021-11-29 DIAGNOSIS — E03.9 ACQUIRED HYPOTHYROIDISM: ICD-10-CM

## 2021-11-29 PROCEDURE — 90471 IMMUNIZATION ADMIN: CPT | Performed by: INTERNAL MEDICINE

## 2021-11-29 PROCEDURE — 90686 IIV4 VACC NO PRSV 0.5 ML IM: CPT | Performed by: INTERNAL MEDICINE

## 2021-11-29 PROCEDURE — 99396 PREV VISIT EST AGE 40-64: CPT | Mod: 25 | Performed by: INTERNAL MEDICINE

## 2021-11-29 ASSESSMENT — MIFFLIN-ST. JEOR: SCORE: 1502.68

## 2021-11-29 ASSESSMENT — PAIN SCALES - GENERAL: PAINLEVEL: SEVERE PAIN (7)

## 2021-11-29 NOTE — PROGRESS NOTES
History of Present Illness:  Ms. Welch is a 50 year old female who presents for  Chief Complaint   Patient presents with     Physical     Pain     Neck and shoulders; struggling to lift arms, sleep     Ada is here today for physical and other concerns about neck/shoulder pain  Follows with Pain Clinic and has seen NSGY  Hx of Post laminectomy pain, HA, cervicalgia without NSGY intervention, awaiting possible MBB, s/p AC joint injections  Hasn't seen ortho for shoulders, Considering getting another opinion about neck surgery from Dr. Lucero at [facility]    Did pharmacogentics testing with PharmD  Resumed duloxetine but not LT4 --makes her feel strainge  Adult child moved in with her recently and she is caring for her 1 yo grandson, causing more fatigue, pain  Will be getting SI joints and AC joints      TSH future  mammo due  Pap done 1/18, due 1/23  [place] 2017, due 2027  Complications:         No immediate complications.   Estimated Blood Loss:  Estimated blood loss was minimal.   Post-Op Diagnosis:     - One small polyp in the sigmoid colon, removed with a                          cold snare. Resected and retrieved.                          - Diverticulosis in the sigmoid colon.     Booster covid  Flu shot discussed    Detailed Medical History:  -EDS-Type 3 Hypermobility. 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 He reports having a Beighton score of 7-8 out of 9. She reports having genetic testing done, which was questionable for the MYLK Gene. She previously followed with a rheumatologist who is an EDS specialist in [place].  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 Russell, has ILR. Ivadrabine and propronolol helping.  -Cervical instability, chiari malformation:  Saw Dr. Moncada, cervical fusion not recommended, considering occipital blocks. Also traveled to [facility] to see Dr. Andrey Sofia.  -Chronic headaches: Daily tension, cervicogenic, migraine, intracranial hypotension. Has not  responded well to injections. Hospitalized in 2017 with normal LP and MRI. At that time responded to DHA.  Had blood patch done 3/19 with good relief of pain. Previously failed amovig and emgality. Blood patch test repeated 9/19. Now managing with medical management, Uses fioricet,maxalt, aleve, tizanidine.  -Hiatal hernia: Recently saw Dr. Mack and had manometry testing done in Feb 2019 which revealed lower esoph pressure, hiatal hernia, possibly short esophagus, peristalsis was preserved. S/p Nissen surgery 2/20.  -Eosinophilic esophagitis: EGD 12/18 performed for food impaction showed ring at GEJ and mucosal changes c/w EoE. Biopsies 8/18 positive for Eos.  -Lumbar pain: She underwent L4 to S1 fusion in 2013 followed by removal of instrumentation, which did not improve her symptoms. She met with Dr. Martinez in the pain clinic and was taken off high doses of Dilaudid and started on Suboxone in 2017, which dramatically improved her symptoms.  She continues to work with physical therapy twice a week as well as other complementary therapies.   -Cervical pain, no surgical targets per neurosurgery  -IBS, constipation: GES showed rapid emptying 10/17. XR video swallow showed no aspiration 10/17.  -Asthma  -Enlarged cervical lymph nodes: L level 2 node biopsied 10/18 with scant cellularity. Biopsied in past and benign with neg flow cytometry.  -Urinary urge incontinence/incomplete emptying  -Pelvic floor dysfunction, rectocele          -Chronic Pain: Has regimen of medical cannabis, tramadol,  butran, cymbalta and tizanidine. Uses medical cannabis  since 2018 which helps. Follows with Dr. Martinez.       Routine Health Maintenence:  Depression Screening:   PHQ-2 Score:     PHQ-2 ( 1999 Pfizer) 8/5/2021 4/29/2021   Q1: Little interest or pleasure in doing things 0 0   Q2: Feeling down, depressed or hopeless 0 0   PHQ-2 Score 0 0   PHQ-2 Total Score (12-17 Years)- Positive if 3 or more points; Administer PHQ-A if positive 0 0    Q1: Little interest or pleasure in doing things Not at all -   Q2: Feeling down, depressed or hopeless Not at all -   PHQ-2 Score 0 -        Immunizations (zoster, pneumovax, flu, Tdap, Hep A/B):   There is no immunization history on file for this patient.  Lipids:   Recent Labs   Lab Test 04/26/18  1010   CHOL 244*   HDL 48*   *   TRIG 156*       Lung Ca Screening (>30 pk age 55-79 or >20 py age 50-79 + RF): n/a low pack years  Colonoscopy (50-75 yrs): 3/17   - One small hyperplastic polyp in the sigmoid colon, removed with a cold snare. Resected and retrieved. Diverticulosis in the sigmoid colon. Repeat colonoscopy in10 years for surveillance   Dexa (>65W or 70M yrs): n/a  Mammogram (40-75 yrs): 1/18 ordered, due  Pap (21-65 yrs): 1/18 HPV neg  HIV/HCV if risk factors:  HIV neg 4/18  Tob/EtOH: screen neg  Lifestyle factors: reviewed  Advanced Directive: deferred      Review of external notes as documented above                   A detailed Review of Systems was performed, verified and is negative except as documented in the HPI.  All health questionnaires were reviewed, verified and relevant information documented above.    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     Cervicalgia      Chronic sinusitis 00/2007    Diagnosed with chronic pansinusitis 2016     Coagulation disorder (H)     factor 8     CSF leak     Chiari malformation     Depression      Tom-Danlos disease      Eosinophilic esophagitis 08/2018     Gastroesophageal reflux disease 3/1/2017    I have large hiatal hernia     Hiatal hernia 2016    Have adeline hiatel hernia     Hoarseness Unsure    It comes and goes      Hypertension      Hypothyroid      IBS (irritable bowel syndrome) with constipation     improved on Linzess     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     Irregular heart beat      Migraines 1/1/2007    Unsure of exact date     Nasal polyps 2013    Were revoved 03/2017, benign     Orthostatic hypotension      Other nervous system complications 1/2014    Autonomic nervous system disorder, neuralgia, neuropathy     Swelling, mass, or lump in head and neck 08/2016    Lymph node has been growing for last year     Thyroid disease 01/01/2008     Urinary incontinence      Urinary urgency        Past Surgical History:  Past Surgical History:   Procedure Laterality Date     AS REPAIR OF NASAL SEPTUM  2009    repair broke nose     BACK SURGERY  12/09/2013  10/01/2014    Spinal fusion L4-S1, L5 moving 5/8ths in. Remove screws/rods     BIOPSY  01/01/2008 & 3/2017    mole biopsy, lymph node biopsy     COLONOSCOPY  3/2017    Colonoscopy and GI     ESOPHAGEAL IMPEDENCE FUNCTION TEST WITH 24 HOUR PH GREATER THAN 1 HOUR N/A 9/17/2019    Procedure: IMPEDANCE PH STUDY, ESOPHAGUS, 24 HOUR;  Surgeon: Raghavendra Grande MD;  Location:  GI     ESOPHAGOSCOPY, GASTROSCOPY, DUODENOSCOPY (EGD), COMBINED N/A 8/3/2018    Procedure: COMBINED ESOPHAGOSCOPY, GASTROSCOPY, DUODENOSCOPY (EGD), BIOPSY SINGLE OR MULTIPLE;;  Surgeon: Juan Woodson MD;  Location:  GI     ESOPHAGOSCOPY, GASTROSCOPY, DUODENOSCOPY (EGD), COMBINED N/A 10/22/2018    Procedure: COMBINED ESOPHAGOSCOPY, GASTROSCOPY, DUODENOSCOPY (EGD), BIOPSY SINGLE OR MULTIPLE;  Surgeon: Sadia Carolina MD;  Location:  GI     ESOPHAGOSCOPY, GASTROSCOPY, DUODENOSCOPY (EGD), COMBINED N/A 12/17/2018    Procedure: COMBINED ESOPHAGOSCOPY, GASTROSCOPY, DUODENOSCOPY (EGD);  Surgeon: Juan Woodson MD;  Location:  GI     INJECT BLOCK MEDIAL BRANCH CERVICAL/THORACIC/LUMBAR Left 9/24/2020    Procedure: BLOCK, NERVE, FACET JOINT, MEDIAL BRANCH, DIAGNOSTIC;   Surgeon: Faiza Martinez MD;  [facility]: [facility]     INJECT BLOCK MEDIAL BRANCH CERVICAL/THORACIC/LUMBAR Bilateral 10/8/2020    Procedure: Bilateral cervical 3, cervical 4, and cervical 5 medial branch nerve block;  Surgeon: Faiza Martinez MD;  Location: [facility] OR     INJECT BLOCK MEDIAL BRANCH CERVICAL/THORACIC/LUMBAR Right 3/4/2021    Procedure: Cervical 3, cervical 4, cervical 5 medial branch nerve blocks to target C3-C4 and C4-C5 facet joint;  Surgeon: Faiza Martinez MD;  Location: [facility] OR     LAPAROSCOPIC HERNIORRHAPHY HIATAL N/A 2/10/2020    Procedure: LAPAROSCOPIC HIATAL HERNIA REPAIR, NISSEN FUNDOPLICATION, ESOPHOGASTRODUODENOSCOPY, PEG TUBE PLACEMENT.;  Surgeon: Alana Mack MD;  Location: [facility] OR     NASAL/SINUS POLYPECTOMY  2017    Polyps were benign     NOSE SURGERY  2007    deviated septum     TONSILLECTOMY & ADENOIDECTOMY  1981     TUBAL LIGATION  07/01/2011       Active Meds:  Current Outpatient Medications   Medication     acetaminophen (TYLENOL) 500 MG tablet     buprenorphine (BUTRANS) 10 MCG/HR WK patch     cetirizine (ZYRTEC) 10 MG tablet     co-enzyme Q-10 100 MG CAPS capsule     EPINEPHrine (EPIPEN/ADRENACLICK/OR ANY BX GENERIC EQUIV) 0.3 MG/0.3ML injection 2-pack     famotidine (PEPCID) 20 MG tablet     fluticasone (FLONASE) 50 MCG/ACT nasal spray     ivabradine (CORLANOR) 7.5 MG tablet     levothyroxine (SYNTHROID/LEVOTHROID) 50 MCG tablet     Lidocaine (LIDOCARE) 4 % Patch     medical cannabis (Patient's own supply.  Not a prescription)     metoclopramide (REGLAN) 10 MG tablet     Multiple Vitamins-Minerals (MULTIVITAMIN PO)     naproxen sodium (ANAPROX) 220 MG tablet     pantoprazole (PROTONIX) 40 MG EC tablet     Probiotic Product (PROBIOTIC DAILY PO)     Rhubarb (ESTROVEN COMPLETE) 4 MG TABS     rizatriptan (MAXALT-MLT) 5 MG ODT     SUMAtriptan (IMITREX STATDOSE) 6 MG/0.5ML pen injector kit     tiZANidine (ZANAFLEX) 4 MG tablet     traMADol (ULTRAM) 50 MG tablet     vitamin B complex with  "vitamin C (VITAMIN  B COMPLEX) TABS tablet     VITAMIN D, CHOLECALCIFEROL, PO     VITRON-C  MG TABS tablet     DULoxetine (CYMBALTA) 30 MG capsule     naloxone (NARCAN) 4 MG/0.1ML nasal spray     propranolol ER (INDERAL LA) 120 MG 24 hr capsule     No current facility-administered medications for this visit.        Allergies:  Baclofen, Bee venom, Chicken-derived products (egg), Compazine [prochlorperazine], Food, Gabapentin, Gluten meal, Pregabalin, Seasonal allergies, and Topiramate    Family History:  family history includes Asthma in her sister and son; Cancer in her father; Connective Tissue Disorder in her mother and sister; Diabetes in her maternal grandmother, paternal grandfather, and paternal grandmother; Heart Disease in her maternal grandmother; Hypertension in her maternal grandmother; Migraines in her father, sister, sister, and sister; Thyroid Disease in her sister.    Social History:  Social History     Tobacco Use     Smoking status: Former Smoker     Packs/day: 0.20     Years: 10.00     Pack years: 2.00     Types: Cigarettes     Start date: 1991     Quit date: 2013     Years since quittin.0     Smokeless tobacco: Never Used   Substance Use Topics     Alcohol use: Yes     Comment: once a month     Drug use: Yes     Types: Marijuana     Comment: medical marijuana       Physical Exam:  Vitals: /85 (BP Location: Right arm, Patient Position: Sitting, Cuff Size: Adult Large)   Pulse 72   Ht 1.702 m (5' 7\")   Wt 84.5 kg (186 lb 4.8 oz)   LMP  (LMP Unknown)   SpO2 96%   BMI 29.18 kg/m    Constitutional: Alert, oriented, pleasant, no acute distress  Head: Normocephalic, atraumatic  Eyes: Extra-ocular movements intact, pupils equally round and reactive bilaterally, no scleral icterus  ENT: Masked  Neck: Supple, no lymphadenopathy  Cardiovascular: Regular rate and rhythm, no murmurs, rubs or gallops, peripheral pulses full/symmetric  Respiratory: Good air movement bilaterally, " lungs clear, no wheezes/rales/rhonchi  GI: Abdomen soft, bowel sounds present, nondistended, nontender, no organomegaly or masses, no rebound/guarding  Musculoskeletal: No edema, normal muscle tone, normal gait, pain with shoulder abduction, pain with supination and external rotation, no weakness noted, no focal tenderness, neck diffusely tender with pain in all ROM  Neurologic: Alert and oriented, cranial nerves 2-12 intact, grossly non-focal  Skin: No rashes/lesions  Psychiatric: normal mentation, affect and mood      Diagnostics:  Labs reviewed in Epic          Assessment and Plan:  Ada was seen today for physical and pain.    Diagnoses and all orders for this visit:    Routine general medical examination at a health care facility  Advised covid booster    Need for influenza vaccination  -     FLU VAC PRESRV FREE QUAD SPLIT VIR 3+YRS IM    Chronic neck pain  Lumbar post-laminectomy syndrome  Chronic left shoulder pain  She has seen numerous providers for her joint pains, I do wonder if she has some rotator cuff tendinopathy, Advised f/u with Sports Med, advised PT, for neck pain could consider second opinion with surgeon of her choice  -     Orthopedic  Referral; Future    Acquired hypothyroidism  -     TSH with free T4 reflex **(3 MO); Future    Encounter for screening mammogram for breast cancer  -     Mammogram, screening w rosalio (3D); Future        Ellyn Rivera MD  Internal Medicine

## 2021-11-29 NOTE — PATIENT INSTRUCTIONS
Patient Education   Personalized Prevention Plan  You are due for the preventive services outlined below.  Your care team is available to assist you in scheduling these services.  If you have already completed any of these items, please share that information with your care team to update in your medical record.  Health Maintenance Due   Topic Date Due     Mammogram  Never done     Hepatitis C Screening  Never done     Hepatitis B Vaccine (1 of 3 - Risk 3-dose series) Never done     COVID-19 Vaccine (3 - Booster for Moderna series) 10/16/2021     Pneumococcal Vaccine (2 of 3 - PCV13) 11/13/2021     Zoster (Shingles) Vaccine (1 of 2) 12/11/2021     Preventive Health Recommendations    See your health care provider every year to    Review health changes.     Discuss preventive care.      Review your medicines if your doctor has prescribed any.    You no longer need a yearly Pap test unless you've had an abnormal Pap test in the past 10 years. If you have vaginal symptoms, such as bleeding or discharge, be sure to talk with your provider about a Pap test.    Every 1 to 2 years, have a mammogram.  If you are over 69, talk with your health care provider about whether or not you want to continue having screening mammograms.    Every 10 years, have a colonoscopy. Or, have a yearly FIT test (stool test). These exams will check for colon cancer.     Have a cholesterol test every 5 years, or more often if your doctor advises it.     Have a diabetes test (fasting glucose) every three years. If you are at risk for diabetes, you should have this test more often.     At age 65, have a bone density scan (DEXA) to check for osteoporosis (brittle bone disease).    Shots:    Get a flu shot each year.    Get a tetanus shot every 10 years.    Talk to your doctor about your pneumonia vaccines. There are now two you should receive - Pneumovax (PPSV 23) and Prevnar (PCV 13).    Talk to your pharmacist about the shingles vaccine.    Talk  to your doctor about the hepatitis B vaccine.    Nutrition:     Eat at least 5 servings of fruits and vegetables each day.    Eat whole-grain bread, whole-wheat pasta and brown rice instead of white grains and rice.    Get adequate Calcium and Vitamin D.     Lifestyle    Exercise at least 150 minutes a week (30 minutes a day, 5 days a week). This will help you control your weight and prevent disease.    Limit alcohol to one drink per day.    No smoking.     Wear sunscreen to prevent skin cancer.     See your dentist twice a year for an exam and cleaning.    See your eye doctor every 1 to 2 years to screen for conditions such as glaucoma, macular degeneration and cataracts.    Personalized Prevention Plan  You are due for the preventive services outlined below.  Your care team is available to assist you in scheduling these services.  If you have already completed any of these items, please share that information with your care team to update in your medical record.  Health Maintenance   Topic Date Due     MAMMO SCREENING  Never done     HEPATITIS C SCREENING  Never done     HEPATITIS B IMMUNIZATION (1 of 3 - Risk 3-dose series) Never done     COVID-19 Vaccine (3 - Booster for Moderna series) 10/16/2021     Pneumococcal Vaccine: Pediatrics (0 to 5 Years) and At-Risk Patients (6 to 64 Years) (2 of 3 - PCV13) 11/13/2021     ZOSTER IMMUNIZATION (1 of 2) 12/11/2021     URINE DRUG SCREEN  08/05/2022     PREVENTIVE CARE VISIT  11/29/2022     HPV TEST  01/19/2023     PAP  01/19/2023     LIPID  04/26/2023     ADVANCE CARE PLANNING  07/25/2023     DTAP/TDAP/TD IMMUNIZATION (2 - Td or Tdap) 11/13/2030     HIV SCREENING  Completed     PHQ-2  Completed     IPV IMMUNIZATION  Aged Out     MENINGITIS IMMUNIZATION  Aged Out     INFLUENZA VACCINE  Discontinued         To schedule an lab appointment at the [facility], please call [phone].    To schedule a Mammogram, please call radiology  scheduling at [phone].

## 2021-11-29 NOTE — NURSING NOTE
Chief Complaint   Patient presents with     Physical     Pain     Neck and shoulders; struggling to lift arms, sleep       Rajan LINDA Gould at 2:40 PM on 11/29/2021.

## 2021-12-08 ENCOUNTER — MYC MEDICAL ADVICE (OUTPATIENT)
Dept: CARDIOLOGY | Facility: CLINIC | Age: 51
End: 2021-12-08
Payer: COMMERCIAL

## 2021-12-09 NOTE — PROGRESS NOTES
Arrived on 2A for blood patch.  Vitals stable.  Vascular consult order for PIV.  Waiting for consent.   PAST MEDICAL HISTORY:  Anxiety

## 2021-12-21 ENCOUNTER — TELEPHONE (OUTPATIENT)
Dept: ANESTHESIOLOGY | Facility: CLINIC | Age: 51
End: 2021-12-21
Payer: COMMERCIAL

## 2021-12-21 ENCOUNTER — MYC REFILL (OUTPATIENT)
Dept: ANESTHESIOLOGY | Facility: CLINIC | Age: 51
End: 2021-12-21
Payer: COMMERCIAL

## 2021-12-21 DIAGNOSIS — G43.719 INTRACTABLE CHRONIC MIGRAINE WITHOUT AURA AND WITHOUT STATUS MIGRAINOSUS: ICD-10-CM

## 2021-12-21 DIAGNOSIS — M96.1 POSTLAMINECTOMY SYNDROME: ICD-10-CM

## 2021-12-21 RX ORDER — METOCLOPRAMIDE 10 MG/1
10 TABLET ORAL 4 TIMES DAILY PRN
Qty: 40 TABLET | Refills: 3 | Status: SHIPPED | OUTPATIENT
Start: 2021-12-21 | End: 2022-05-17

## 2021-12-21 NOTE — TELEPHONE ENCOUNTER
Refill request Sent to the clinic on 12/21/21    Two Medications:     traMADol (ULTRAM) 50 MG tablet     buprenorphine (BUTRANS) 10 MCG/HR WK patch    MNPMP Checked: Yes    Tramadol:  Last refilled 11/24/21 for 60 tablets    Butrans: Last refilled 11/24/21 for 4 patches. 28 day supply.     Last clinic appointment: 9/16/21  Next clinic appointment: 1/21/22 with Andreea Pop. 2/17/22, In person with Dr. Martinez.        Procedure was cancelled for 11/11/21, therefore pt cancelled follow up on 12/10/21 with Andreea Pop.   Patient stated she can't reschedule procedure as she can't find a ride Monday through Friday.     Last Drug Screen Collected:  8/5/21  Controlled Substance Agreement signed: 8/5/21      Preferred pharmacy:    [id number] [address]    Refill request routed to the provider on 12/21/21.    Mya Cash LPN

## 2021-12-21 NOTE — TELEPHONE ENCOUNTER
Rx Authorization:    Requested Medication/ Dose metoclopramide (REGLAN) 10 MG tablet    Date last refill ordered: 9/2/20    Quantity ordered: 40    # refills: 3    Date of last clinic visit with ordering provider: 5/19/20    Date of next clinic visit with ordering provider:     All pertinent protocol data (lab date/result):     Include pertinent information from patients message:

## 2021-12-23 RX ORDER — TRAMADOL HYDROCHLORIDE 50 MG/1
TABLET ORAL
Qty: 60 TABLET | Refills: 0 | Status: CANCELLED | OUTPATIENT
Start: 2021-12-23

## 2021-12-24 RX ORDER — BUPRENORPHINE 10 UG/H
1 PATCH TRANSDERMAL
Qty: 4 PATCH | Refills: 3 | Status: SHIPPED | OUTPATIENT
Start: 2021-12-24 | End: 2022-06-14

## 2021-12-24 RX ORDER — TRAMADOL HYDROCHLORIDE 50 MG/1
50 TABLET ORAL EVERY 6 HOURS PRN
Qty: 60 TABLET | Refills: 1 | Status: SHIPPED | OUTPATIENT
Start: 2021-12-24 | End: 2022-02-09

## 2021-12-24 NOTE — TELEPHONE ENCOUNTER
Per Dr. Martinez both Tramadol and Butrans were sent to the pharmacy. LPN called to confirm the pharmacy received the mediation, however they did not receive Tramadol.   Dr. Martinez was updated and was agreeable to LPN calling the medication in with the pharmacist.       LPN called and updated the pt that the medication was sent to the pharmacy. Additionally, [facility] replied to pt's MyChart.     Mya Cash LPN

## 2021-12-29 ENCOUNTER — TELEPHONE (OUTPATIENT)
Dept: CARDIOLOGY | Facility: CLINIC | Age: 51
End: 2021-12-29
Payer: COMMERCIAL

## 2022-01-20 NOTE — PROGRESS NOTES
Ada is a 51 year old who is being evaluated via a billable video visit.      How would you like to obtain your AVS? MyChart  If the video visit is dropped, the invitation should be resent by: Text to cell phone: [phone]   Will anyone else be joining your video visit? No      Video Start Time: 3:30 PM      [facility]     Date of visit: 1/21/2022      Assessment:   Ada Welch is a 51 year old female who  is well known to me for multifocal chronic pain. The patient has  complex history of low back pain for ~25 years for which she underwent L4-S1 fusion (TLIF) 12/9/13.  Postoperatively, her back pain worsened (mainly LBP, but also radiated down both lower limbs in an S1 dermatomal distribution).   Underwent removal of posterior instrumentation (screws and guadalupe).  After fusion removal, her pain continued.  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  She even sought care from a EDS specialist in [place].  She is currently on disability.      Visit Diagnoses:  1. Postlaminectomy syndrome, lumbar region    2. Intractable chronic migraine without aura and without status migrainosus    3. Arthropathy of cervical facet joint    4. DDD (degenerative disc disease), lumbar    5. SI (sacroiliac) joint dysfunction    6. Chronic pain syndrome        Plan:      Ada reports excellent pain benefit with Butrans and Tramadol and notes these medications allow her to be more active. For these reasons, it is reasonable to continue long term for pain management. I wonder if an increase in Butrans would allow her to take less Tramadol. Could be discussed at follow up with Dr. Martinez next month. Continue Butrans, tramadol at current doses,    Restart amitriptyline 10 mg nightly as sleep has been difficult lately- monitor pain and sleep benefit.    Duloxetine 60 mg every morning and 30 mg nightly,    Schedule bilateral SI joint injections when feeling well and interested.    Follow up with   "Juan next month as planned.        Andreea KITCHEN [facility]     -------------------------------------------------    Subjective:    Chief complaint:   Chief Complaint   Patient presents with     Pain follow-up       Interval history:  Ada Welch is a 51 year old female last seen on 9/16/2021.  She is a patient of Dr. Martinez seen in follow up.     Recommendations/plan at the last visit included:    Continue Butrans, tramadol    Ordered physical therapy of the right shoulder    Continue amitriptyline 30 mg nightly    Duloxetine 60 mg every morning and 30 mg nightly    Ordered bilateral sacroiliac joint injection.  Risk of the procedure including bleeding, infection, failed procedure, nerve injury, discussed with the patient.  All questions answered.  Will check with her insurance about cervical medial branch nerve radiofrequency ablation.  She had 50% pain relief with cervical medial branch nerve block.  Also discussed about SI joint injection and suprascapular nerve block for shoulder pain.     Follow-up in 6 months.       Since her last visit, Ada Welch reports:  -Her pain is somewhat worse than it was at last visit.   -She attributes the worsened pain to be due to cold weather, states she has two different kind of pain flares. Her most bothersome pain flares causes body wide muscle spasm. She wonders if there is a medication that can, \"turn off her brain.\"   -She finds medical cannabis and tizanidine somewhat helpful to get through pain flares.  -She continues Butrans patch 10mcg patch and Tramadol 50mg BID with some good pain benefit. States with this regimen, \"I'm able to be fairly mobile\" and has been stable on this for the last four years.   -She stopped taking amitriptyline, because of fatigue. She isn't sure if this made a difference since stopping. She continues Cymbalta 60mg daily and 30mg at bedtime.   -She has had a lot of illness in her family influenza A, " pneumonia, and COVID. She wasn't able to have SI joint injections as scheduled given illness.      Pain Information:   Pain rating: averages 7/10 on a 0-10 scale.    Current pain medications:    Butrans patch 10mcg    Tramadol 50mg BID   Tizanidine 4mg TID prn    Medical Cannabis    THE 4 A's OF OPIOID MAINTENANCE ANALGESIA    Analgesia: very good    Activity: improved    Adverse effects: denies    Adherence to Rx protocol: good    [place] Board of Pharmacy Data Base Reviewed:    YES; no concerns      Review of [place] Prescription Monitoring Program (): No concern for abuse or misuse of controlled medications based on this report.     Annual Controlled Substance Agreement/UDS due date: August 2022      Medications:  Current Outpatient Medications   Medication Sig Dispense Refill     acetaminophen (TYLENOL) 500 MG tablet Take 1,000 mg by mouth every 6 hours as needed for mild pain       buprenorphine (BUTRANS) 10 MCG/HR WK patch Place 1 patch onto the skin every 7 days 4 patch 3     buprenorphine (BUTRANS) 10 MCG/HR WK patch APPLY 1 PATCH ONCE A WEEK 4 patch 2     cetirizine (ZYRTEC) 10 MG tablet Take 10 mg by mouth daily as needed        co-enzyme Q-10 100 MG CAPS capsule Take 100 mg by mouth daily       DULoxetine (CYMBALTA) 30 MG capsule Take 60mg in the morning and 30mg at bedtime 270 capsule 3     EPINEPHrine (EPIPEN/ADRENACLICK/OR ANY BX GENERIC EQUIV) 0.3 MG/0.3ML injection 2-pack INJECT 0.3 MLS (0.3 MG) INTO THE MUSCLE AS NEEDED FOR ANAPHYLAXIS  1     famotidine (PEPCID) 20 MG tablet Take 1 tablet (20 mg) by mouth At Bedtime 90 tablet 3     fluticasone (FLONASE) 50 MCG/ACT nasal spray Spray 2 sprays into both nostrils daily Call clinic to schedule follow up appointment. 48 mL 4     ivabradine (CORLANOR) 7.5 MG tablet Take 1 tab (7.5mg)  In AM and half tab (3.75mg) in afternoon 135 tablet 3     levothyroxine (SYNTHROID/LEVOTHROID) 50 MCG tablet Take 1 tablet (50 mcg) by mouth daily 90 tablet 3      Lidocaine (LIDOCARE) 4 % Patch Place 1-3 patches onto the skin every 24 hours To prevent lidocaine toxicity, patient should be patch free for 12 hrs daily. 20 patch 0     medical cannabis (Patient's own supply.  Not a prescription) Take 1 Dose by mouth See Admin Instructions (This is NOT a prescription, and does not certify that the patient has a qualifying medical condition for medical cannabis.  The purpose of this order is  to document that the patient reports taking medical cannabis.)     Capsule formulation - uses as needed (currently out of this and not using as of January 28, 2020)       metoclopramide (REGLAN) 10 MG tablet Take 1 tablet (10 mg) by mouth 4 times daily as needed (headache) 40 tablet 3     Multiple Vitamins-Minerals (MULTIVITAMIN PO) Take 1 tablet by mouth daily        naloxone (NARCAN) 4 MG/0.1ML nasal spray Spray 1 spray (4 mg) into one nostril alternating nostrils once as needed for opioid reversal every 2-3 minutes until assistance arrives 0.2 mL 0     naproxen sodium (ANAPROX) 220 MG tablet Take 440-660 mg by mouth daily as needed for moderate pain       pantoprazole (PROTONIX) 40 MG EC tablet Take 1 tablet (40 mg) by mouth daily 90 tablet 3     Probiotic Product (PROBIOTIC DAILY PO) Take 2 packets by mouth every morning        Rhubarb (ESTROVEN COMPLETE) 4 MG TABS Take 1 tablet by mouth       rizatriptan (MAXALT-MLT) 5 MG ODT TAKE 1-2 TABLETS (5-10 MG) BY MOUTH AT ONSET OF HEADACHE FOR MIGRAINE (MAX 30 MG IN 24 HOURS) 18 tablet 11     SUMAtriptan (IMITREX STATDOSE) 6 MG/0.5ML pen injector kit Inject 0.5 ml (6 mg) subcutaneously at onset of migraine, May repeat in 1 hour , Max 12 mg/24 hrs 2 kit 11     tiZANidine (ZANAFLEX) 4 MG tablet Take 1 tablet (4 mg) by mouth 4 times daily as needed for muscle spasms 360 tablet 5     traMADol (ULTRAM) 50 MG tablet Take 1 tablet (50 mg) by mouth every 6 hours as needed for severe pain TAKE 1 TABLET BY MOUTH EVERY 6 HOURS AS NEEDED FOR SEVERE PAIN 60  tablet 1     vitamin B complex with vitamin C (VITAMIN  B COMPLEX) TABS tablet Take 1 tablet by mouth daily       VITAMIN D, CHOLECALCIFEROL, PO Take 2,000 Units by mouth daily       VITRON-C  MG TABS tablet TAKE 1 TABLET BY MOUTH EVERY OTHER  tablet 1       Medical History: any changes in medical history since they were last seen? No    Review of Systems: A 10-point review of systems was negative, with the exception of chronic pain issues.     Objective:    Physical Exam:  not currently breastfeeding.  Constitutional: Well developed, well nourished, appears stated age.  HEENT: Head atraumatic, normocephalic. Eyes without conjunctival injection or jaundice. Oropharynx clear. Neck supple. No obvious neck masses.  Skin: No rash, lesions, or petechiae of exposed skin.   Psychiatric/mental status: Alert, without lethargy or stupor. Speech fluent. Appropriate affect. Mood normal. Able to follow commands without difficulty.     BILLING TIME DOCUMENTATION:   The total TIME spent on this patient on the date of the encounter/appointment was 24 minutes.      TOTAL TIME includes:   Time spent preparing to see the patient (reviewing records and tests)   Time spent face to face (or over the phone) with the patient   Time spent ordering tests, medications, procedures and referrals   Time spent Referring and communicating with other healthcare professionals   Time spent documenting clinical information in Epic       Video-Visit Details    Type of service:  Video Visit    Video End Time:3:50 PM    Originating Location (pt. Location): Home    Distant Location (provider location):  [facility]PREH[facility] PAIN [facility]NAGEMENT [place]     Platform used for Video Visit: Sobeida jorgensen

## 2022-01-21 ENCOUNTER — VIRTUAL VISIT (OUTPATIENT)
Dept: ANESTHESIOLOGY | Facility: CLINIC | Age: 52
End: 2022-01-21
Payer: COMMERCIAL

## 2022-01-21 DIAGNOSIS — M51.369 DDD (DEGENERATIVE DISC DISEASE), LUMBAR: ICD-10-CM

## 2022-01-21 DIAGNOSIS — G43.719 INTRACTABLE CHRONIC MIGRAINE WITHOUT AURA AND WITHOUT STATUS MIGRAINOSUS: ICD-10-CM

## 2022-01-21 DIAGNOSIS — M47.812 ARTHROPATHY OF CERVICAL FACET JOINT: ICD-10-CM

## 2022-01-21 DIAGNOSIS — M96.1 POSTLAMINECTOMY SYNDROME, LUMBAR REGION: Primary | ICD-10-CM

## 2022-01-21 DIAGNOSIS — G89.4 CHRONIC PAIN SYNDROME: ICD-10-CM

## 2022-01-21 DIAGNOSIS — M53.3 SI (SACROILIAC) JOINT DYSFUNCTION: ICD-10-CM

## 2022-01-21 PROCEDURE — 99213 OFFICE O/P EST LOW 20 MIN: CPT | Mod: 95 | Performed by: NURSE PRACTITIONER

## 2022-01-21 RX ORDER — AMITRIPTYLINE HYDROCHLORIDE 10 MG/1
10 TABLET ORAL AT BEDTIME
Qty: 30 TABLET | Refills: 1 | Status: SHIPPED | OUTPATIENT
Start: 2022-01-21 | End: 2022-03-10

## 2022-01-21 NOTE — PATIENT INSTRUCTIONS
Continue Butrans, tramadol at current doses    Restart amitriptyline 10 mg nightly- monitor pain and sleep benefit    Duloxetine 60 mg every morning and 30 mg nightly    Schedule bilateral SI joint injections when feeling well and interested    Follow up with Dr. Martinez next month as planned

## 2022-01-21 NOTE — LETTER
1/21/2022       RE: Ada Welch  [address] [address]     Dear Colleague,    Thank you for referring your patient, Ada Welch, to the [facility] [place] at [facility]. Please see a copy of my visit note below.    Ada is a 51 year old who is being evaluated via a billable video visit.      How would you like to obtain your AVS? MyChart  If the video visit is dropped, the invitation should be resent by: Text to cell phone: [phone]   Will anyone else be joining your video visit? No      Video Start Time: 3:30 PM      [facility]     Date of visit: 1/21/2022      Assessment:   Ada Welch is a 51 year old female who  is well known to me for multifocal chronic pain. The patient has  complex history of low back pain for ~25 years for which she underwent L4-S1 fusion (TLIF) 12/9/13.  Postoperatively, her back pain worsened (mainly LBP, but also radiated down both lower limbs in an S1 dermatomal distribution).   Underwent removal of posterior instrumentation (screws and guadalupe).  After fusion removal, her pain continued.  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  She even sought care from a EDS specialist in [place].  She is currently on disability.      Visit Diagnoses:  1. Postlaminectomy syndrome, lumbar region    2. Intractable chronic migraine without aura and without status migrainosus    3. Arthropathy of cervical facet joint    4. DDD (degenerative disc disease), lumbar    5. SI (sacroiliac) joint dysfunction    6. Chronic pain syndrome        Plan:      Ada reports excellent pain benefit with Butrans and Tramadol and notes these medications allow her to be more active. For these reasons, it is reasonable to continue long term for pain management. I wonder if an increase in Butrans would allow her to take less Tramadol. Could be discussed at  "follow up with Dr. Martinez next month. Continue Butrans, tramadol at current doses,    Restart amitriptyline 10 mg nightly as sleep has been difficult lately- monitor pain and sleep benefit.    Duloxetine 60 mg every morning and 30 mg nightly,    Schedule bilateral SI joint injections when feeling well and interested.    Follow up with Dr. Martinez next month as planned.        Andreea KITCHEN [facility] Management     -------------------------------------------------    Subjective:    Chief complaint:   Chief Complaint   Patient presents with     Pain follow-up       Interval history:  Ada Welch is a 51 year old female last seen on 9/16/2021.  She is a patient of Dr. Martinez seen in follow up.     Recommendations/plan at the last visit included:    Continue Butrans, tramadol    Ordered physical therapy of the right shoulder    Continue amitriptyline 30 mg nightly    Duloxetine 60 mg every morning and 30 mg nightly    Ordered bilateral sacroiliac joint injection.  Risk of the procedure including bleeding, infection, failed procedure, nerve injury, discussed with the patient.  All questions answered.  Will check with her insurance about cervical medial branch nerve radiofrequency ablation.  She had 50% pain relief with cervical medial branch nerve block.  Also discussed about SI joint injection and suprascapular nerve block for shoulder pain.     Follow-up in 6 months.       Since her last visit, Ada Welch reports:  -Her pain is somewhat worse than it was at last visit.   -She attributes the worsened pain to be due to cold weather, states she has two different kind of pain flares. Her most bothersome pain flares causes body wide muscle spasm. She wonders if there is a medication that can, \"turn off her brain.\"   -She finds medical cannabis and tizanidine somewhat helpful to get through pain flares.  -She continues Butrans patch 10mcg patch and Tramadol 50mg BID with some good pain benefit. " "States with this regimen, \"I'm able to be fairly mobile\" and has been stable on this for the last four years.   -She stopped taking amitriptyline, because of fatigue. She isn't sure if this made a difference since stopping. She continues Cymbalta 60mg daily and 30mg at bedtime.   -She has had a lot of illness in her family influenza A, pneumonia, and COVID. She wasn't able to have SI joint injections as scheduled given illness.      Pain Information:   Pain rating: averages 7/10 on a 0-10 scale.    Current pain medications:    Butrans patch 10mcg    Tramadol 50mg BID   Tizanidine 4mg TID prn    Medical Cannabis    THE 4 A's OF OPIOID MAINTENANCE ANALGESIA    Analgesia: very good    Activity: improved    Adverse effects: denies    Adherence to Rx protocol: good    [place] Board of Pharmacy Data Base Reviewed:    YES; no concerns      Review of [place] Prescription Monitoring Program (): No concern for abuse or misuse of controlled medications based on this report.     Annual Controlled Substance Agreement/UDS due date: August 2022      Medications:  Current Outpatient Medications   Medication Sig Dispense Refill     acetaminophen (TYLENOL) 500 MG tablet Take 1,000 mg by mouth every 6 hours as needed for mild pain       buprenorphine (BUTRANS) 10 MCG/HR WK patch Place 1 patch onto the skin every 7 days 4 patch 3     buprenorphine (BUTRANS) 10 MCG/HR WK patch APPLY 1 PATCH ONCE A WEEK 4 patch 2     cetirizine (ZYRTEC) 10 MG tablet Take 10 mg by mouth daily as needed        co-enzyme Q-10 100 MG CAPS capsule Take 100 mg by mouth daily       DULoxetine (CYMBALTA) 30 MG capsule Take 60mg in the morning and 30mg at bedtime 270 capsule 3     EPINEPHrine (EPIPEN/ADRENACLICK/OR ANY BX GENERIC EQUIV) 0.3 MG/0.3ML injection 2-pack INJECT 0.3 MLS (0.3 MG) INTO THE MUSCLE AS NEEDED FOR ANAPHYLAXIS  1     famotidine (PEPCID) 20 MG tablet Take 1 tablet (20 mg) by mouth At Bedtime 90 tablet 3     fluticasone (FLONASE) 50 " MCG/ACT nasal spray Spray 2 sprays into both nostrils daily Call clinic to schedule follow up appointment. 48 mL 4     ivabradine (CORLANOR) 7.5 MG tablet Take 1 tab (7.5mg)  In AM and half tab (3.75mg) in afternoon 135 tablet 3     levothyroxine (SYNTHROID/LEVOTHROID) 50 MCG tablet Take 1 tablet (50 mcg) by mouth daily 90 tablet 3     Lidocaine (LIDOCARE) 4 % Patch Place 1-3 patches onto the skin every 24 hours To prevent lidocaine toxicity, patient should be patch free for 12 hrs daily. 20 patch 0     medical cannabis (Patient's own supply.  Not a prescription) Take 1 Dose by mouth See Admin Instructions (This is NOT a prescription, and does not certify that the patient has a qualifying medical condition for medical cannabis.  The purpose of this order is  to document that the patient reports taking medical cannabis.)     Capsule formulation - uses as needed (currently out of this and not using as of January 28, 2020)       metoclopramide (REGLAN) 10 MG tablet Take 1 tablet (10 mg) by mouth 4 times daily as needed (headache) 40 tablet 3     Multiple Vitamins-Minerals (MULTIVITAMIN PO) Take 1 tablet by mouth daily        naloxone (NARCAN) 4 MG/0.1ML nasal spray Spray 1 spray (4 mg) into one nostril alternating nostrils once as needed for opioid reversal every 2-3 minutes until assistance arrives 0.2 mL 0     naproxen sodium (ANAPROX) 220 MG tablet Take 440-660 mg by mouth daily as needed for moderate pain       pantoprazole (PROTONIX) 40 MG EC tablet Take 1 tablet (40 mg) by mouth daily 90 tablet 3     Probiotic Product (PROBIOTIC DAILY PO) Take 2 packets by mouth every morning        Rhubarb (ESTROVEN COMPLETE) 4 MG TABS Take 1 tablet by mouth       rizatriptan (MAXALT-MLT) 5 MG ODT TAKE 1-2 TABLETS (5-10 MG) BY MOUTH AT ONSET OF HEADACHE FOR MIGRAINE (MAX 30 MG IN 24 HOURS) 18 tablet 11     SUMAtriptan (IMITREX STATDOSE) 6 MG/0.5ML pen injector kit Inject 0.5 ml (6 mg) subcutaneously at onset of migraine, May  repeat in 1 hour , Max 12 mg/24 hrs 2 kit 11     tiZANidine (ZANAFLEX) 4 MG tablet Take 1 tablet (4 mg) by mouth 4 times daily as needed for muscle spasms 360 tablet 5     traMADol (ULTRAM) 50 MG tablet Take 1 tablet (50 mg) by mouth every 6 hours as needed for severe pain TAKE 1 TABLET BY MOUTH EVERY 6 HOURS AS NEEDED FOR SEVERE PAIN 60 tablet 1     vitamin B complex with vitamin C (VITAMIN  B COMPLEX) TABS tablet Take 1 tablet by mouth daily       VITAMIN D, CHOLECALCIFEROL, PO Take 2,000 Units by mouth daily       VITRON-C  MG TABS tablet TAKE 1 TABLET BY MOUTH EVERY OTHER  tablet 1       Medical History: any changes in medical history since they were last seen? No    Review of Systems: A 10-point review of systems was negative, with the exception of chronic pain issues.     Objective:    Physical Exam:  not currently breastfeeding.  Constitutional: Well developed, well nourished, appears stated age.  HEENT: Head atraumatic, normocephalic. Eyes without conjunctival injection or jaundice. Oropharynx clear. Neck supple. No obvious neck masses.  Skin: No rash, lesions, or petechiae of exposed skin.   Psychiatric/mental status: Alert, without lethargy or stupor. Speech fluent. Appropriate affect. Mood normal. Able to follow commands without difficulty.     BILLING TIME DOCUMENTATION:   The total TIME spent on this patient on the date of the encounter/appointment was 24 minutes.      TOTAL TIME includes:   Time spent preparing to see the patient (reviewing records and tests)   Time spent face to face (or over the phone) with the patient   Time spent ordering tests, medications, procedures and referrals   Time spent Referring and communicating with other healthcare professionals   Time spent documenting clinical information in Epic       Video-Visit Details    Type of service:  Video Visit    Video End Time:3:50 PM    Originating Location (pt. Location): Home    Distant Location (provider location):    [facility] FOR COMPREHENSIVE PAIN MANAGEMENT [place]     Platform used for Video Visit: [employer]  s      Again, thank you for allowing me to participate in the care of your patient.      Sincerely,    FIDELINA Koch CNP

## 2022-02-09 ENCOUNTER — TELEPHONE (OUTPATIENT)
Dept: ANESTHESIOLOGY | Facility: CLINIC | Age: 52
End: 2022-02-09
Payer: COMMERCIAL

## 2022-02-09 DIAGNOSIS — M96.1 POSTLAMINECTOMY SYNDROME: ICD-10-CM

## 2022-02-09 RX ORDER — TRAMADOL HYDROCHLORIDE 50 MG/1
50 TABLET ORAL EVERY 6 HOURS PRN
Qty: 60 TABLET | Refills: 0 | Status: SHIPPED | OUTPATIENT
Start: 2022-02-12 | End: 2022-03-08

## 2022-02-09 NOTE — TELEPHONE ENCOUNTER
Refill request    Medication: traMADol (ULTRAM) 50 MG tablet      Last clinic appointment: 1/21/22  Next clinic appointment: 3/14/22    Last Drug Screen Collected: 8/5/21  Controlled Substance Agreement signed: 8/5/21      Preferred [facility]:    [id number] [address]

## 2022-02-09 NOTE — TELEPHONE ENCOUNTER
Signed Prescriptions:                        Disp   Refills    traMADol (ULTRAM) 50 MG tablet             60 tab*0        Sig: Take 1 tablet (50 mg) by mouth every 6 hours as           needed for severe pain TAKE 1 TABLET BY MOUTH           EVERY 6 HOURS AS NEEDED FOR SEVERE PAIN  Authorizing Provider: CHARMAINE MULLINS      Reviewed [place] Prescription Monitoring Program, appears appropriate.     FIDELINA Robison [facility] Pain Management

## 2022-02-09 NOTE — TELEPHONE ENCOUNTER
[facility] Call Center    Phone Message    May a detailed message be left on voicemail: yes     Reason for Call: Medication Refill Request    Has the patient contacted the pharmacy for the refill? Yes   Name of medication being requested: traMADol (ULTRAM) 50 MG tablet  Provider who prescribed the medication: Juan  Pharmacy: [facility] [id number] [address]    Date medication is needed: As soon as possible    Patient stated she will need prior auth.         Action Taken: Message routed to:  [facility] pain    Travel Screening: Not Applicable

## 2022-02-16 ENCOUNTER — TELEPHONE (OUTPATIENT)
Dept: ANESTHESIOLOGY | Facility: CLINIC | Age: 52
End: 2022-02-16
Payer: COMMERCIAL

## 2022-02-16 NOTE — TELEPHONE ENCOUNTER
[facility] Call Center    Phone Message    May a detailed message be left on voicemail: yes       Reason for Call: Other: .      Per Patient states she had an appt with Dr. Martinez on 02/17/2022 and appt was cancelled and rescheduled with Anamaria Pop without being notified. Patient is upset with not being notified about the change and stated that she is not wanting to be seen with Anamaria Pop. Patient is scheduled with Dr. Martinez on 03/17/2022.  Please advise.      Action Taken: Message routed to:  [facility] ([facility] Pain    Travel Screening: Not Applicable

## 2022-02-21 NOTE — TELEPHONE ENCOUNTER
[facility] CALLS.  PT WILL BE TRANSFERRED TO [facility] INSTEAD OF BY [facility]. Please see MyChart encounter from 2/16/22.    Jessica De Souza DNP, RN

## 2022-02-25 ENCOUNTER — TELEPHONE (OUTPATIENT)
Dept: ANESTHESIOLOGY | Facility: CLINIC | Age: 52
End: 2022-02-25
Payer: COMMERCIAL

## 2022-02-28 ENCOUNTER — TELEPHONE (OUTPATIENT)
Dept: ANESTHESIOLOGY | Facility: CLINIC | Age: 52
End: 2022-02-28
Payer: COMMERCIAL

## 2022-03-04 DIAGNOSIS — K44.9 HIATAL HERNIA: Primary | ICD-10-CM

## 2022-03-07 ENCOUNTER — MYC MEDICAL ADVICE (OUTPATIENT)
Dept: ANESTHESIOLOGY | Facility: CLINIC | Age: 52
End: 2022-03-07
Payer: COMMERCIAL

## 2022-03-07 ENCOUNTER — MYC REFILL (OUTPATIENT)
Dept: ANESTHESIOLOGY | Facility: CLINIC | Age: 52
End: 2022-03-07
Payer: COMMERCIAL

## 2022-03-07 DIAGNOSIS — M96.1 POSTLAMINECTOMY SYNDROME: ICD-10-CM

## 2022-03-07 RX ORDER — TRAMADOL HYDROCHLORIDE 50 MG/1
50 TABLET ORAL EVERY 6 HOURS PRN
Qty: 60 TABLET | Refills: 0 | Status: CANCELLED | OUTPATIENT
Start: 2022-03-07

## 2022-03-07 NOTE — TELEPHONE ENCOUNTER
Refills have been requested for the following medications:         traMADol (ULTRAM) 50 MG tablet [Anamaria Pop, APRN CNP]     Preferred pharmacy: [address]

## 2022-03-08 RX ORDER — TRAMADOL HYDROCHLORIDE 50 MG/1
50 TABLET ORAL EVERY 6 HOURS PRN
Qty: 60 TABLET | Refills: 3 | Status: SHIPPED | OUTPATIENT
Start: 2022-03-08 | End: 2022-06-14

## 2022-03-08 NOTE — TELEPHONE ENCOUNTER
Refill request    Medication: traMADol (ULTRAM) 50 MG tablet      Last clinic appointment: 1/21/22  Next clinic appointment: 3/17/22 -Dr. Martinez    Last Drug Screen Collected: 8/5/21  Controlled Substance Agreement signed: 8/5/21      [facility]erred pharmacy[facility]    [id number] [place] [address]      Skye Jernigan RN

## 2022-03-09 DIAGNOSIS — K44.9 HIATAL HERNIA: Primary | ICD-10-CM

## 2022-03-09 DIAGNOSIS — D47.09 MAST CELL DISORDER: ICD-10-CM

## 2022-03-10 ENCOUNTER — LAB (OUTPATIENT)
Dept: LAB | Facility: CLINIC | Age: 52
End: 2022-03-10
Payer: COMMERCIAL

## 2022-03-10 ENCOUNTER — ANCILLARY PROCEDURE (OUTPATIENT)
Dept: GENERAL RADIOLOGY | Facility: CLINIC | Age: 52
End: 2022-03-10
Attending: ANESTHESIOLOGY
Payer: COMMERCIAL

## 2022-03-10 ENCOUNTER — OFFICE VISIT (OUTPATIENT)
Dept: INTERNAL MEDICINE | Facility: CLINIC | Age: 52
End: 2022-03-10
Payer: COMMERCIAL

## 2022-03-10 VITALS
OXYGEN SATURATION: 99 % | HEART RATE: 76 BPM | SYSTOLIC BLOOD PRESSURE: 158 MMHG | WEIGHT: 178.4 LBS | DIASTOLIC BLOOD PRESSURE: 108 MMHG | HEIGHT: 67 IN | BODY MASS INDEX: 28 KG/M2

## 2022-03-10 DIAGNOSIS — M25.512 CHRONIC PAIN OF BOTH SHOULDERS: Primary | ICD-10-CM

## 2022-03-10 DIAGNOSIS — E03.9 ACQUIRED HYPOTHYROIDISM: ICD-10-CM

## 2022-03-10 DIAGNOSIS — M25.511 CHRONIC PAIN OF BOTH SHOULDERS: Primary | ICD-10-CM

## 2022-03-10 DIAGNOSIS — Q79.60 EDS (EHLERS-DANLOS SYNDROME): ICD-10-CM

## 2022-03-10 DIAGNOSIS — G89.29 CHRONIC PAIN OF BOTH SHOULDERS: Primary | ICD-10-CM

## 2022-03-10 DIAGNOSIS — G47.01 INSOMNIA DUE TO MEDICAL CONDITION: ICD-10-CM

## 2022-03-10 LAB — TSH SERPL DL<=0.005 MIU/L-ACNC: 1.34 MU/L (ref 0.4–4)

## 2022-03-10 PROCEDURE — 99214 OFFICE O/P EST MOD 30 MIN: CPT | Performed by: INTERNAL MEDICINE

## 2022-03-10 PROCEDURE — 36415 COLL VENOUS BLD VENIPUNCTURE: CPT | Performed by: PATHOLOGY

## 2022-03-10 PROCEDURE — 73030 X-RAY EXAM OF SHOULDER: CPT | Mod: RT | Performed by: RADIOLOGY

## 2022-03-10 PROCEDURE — 84443 ASSAY THYROID STIM HORMONE: CPT | Performed by: PATHOLOGY

## 2022-03-10 RX ORDER — DOXEPIN 3 MG/1
3 TABLET, FILM COATED ORAL
Qty: 30 TABLET | Refills: 1 | Status: SHIPPED | OUTPATIENT
Start: 2022-03-10 | End: 2022-05-17

## 2022-03-10 NOTE — PROGRESS NOTES
"History of Present Illness:  Ms. Welch is a 51 year old female who presents for  Chief Complaint   Patient presents with     RECHECK       Going to [place] [place] for Spring Break  Had a lot of colds, flus recently, it's been a \"rough winter\", more pain flare ups  Has mammo scheduled  She was restarted on amitriptyline low dose recently for sleep, she doesn't feel it's helping  Also notes some possible side effects (see below)  Having flare of muscle pain several times per week  Still on butrans, tramadol,  tizanidine  Still on duloxetine  Reports some facial swelling, body sweling, dry mouth  Still has some shoulder pain, didn't follow up with sports med or PT yet  Her BP is high today, she ran out of [facility]  Still needs to turn in pharmacogentics testing with PharmD  Adult child moved in with her recently and she is caring for her 3 yo grandson, causing more fatigue, pain      Detailed Medical History:  -EDS-Type 3 Hypermobility. 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 He reports having a Beighton score of 7-8 out of 9. She reports having genetic testing done, which was questionable for the MYLK Gene. She previously followed with a rheumatologist who is an EDS specialist in [place].  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 Russell, has ILR. Ivadrabine and propronolol helping.  -Cervical instability, chiari malformation:  Saw Dr. Moncada, cervical fusion not recommended, considering occipital blocks. Also traveled to [facility] to see Dr. Andrey Sofia.  -Chronic headaches: Daily tension, cervicogenic, migraine, intracranial hypotension. Has not responded well to injections. Hospitalized in 2017 with normal LP and MRI. At that time responded to DHA.  Had blood patch done 3/19 with good relief of pain. Previously failed amovig and emgality. Blood patch test repeated 9/19. Now managing with medical management, Uses fioricet,maxalt, aleve, tizanidine.  -Hiatal hernia: Recently saw Dr. Mack " and had manometry testing done in Feb 2019 which revealed lower esoph pressure, hiatal hernia, possibly short esophagus, peristalsis was preserved. S/p Nissen surgery 2/20.  -Eosinophilic esophagitis: EGD 12/18 performed for food impaction showed ring at GEJ and mucosal changes c/w EoE. Biopsies 8/18 positive for Eos.  -Lumbar pain: She underwent L4 to S1 fusion in 2013 followed by removal of instrumentation, which did not improve her symptoms. She met with Dr. Martinez in the pain clinic and was taken off high doses of Dilaudid and started on Suboxone in 2017, which dramatically improved her symptoms.  She continues to work with physical therapy twice a week as well as other complementary therapies.   -Cervical pain, no surgical targets per neurosurgery  -IBS, constipation: GES showed rapid emptying 10/17. XR video swallow showed no aspiration 10/17.  -Asthma  -Enlarged cervical lymph nodes: L level 2 node biopsied 10/18 with scant cellularity. Biopsied in past and benign with neg flow cytometry.  -Urinary urge incontinence/incomplete emptying  -Pelvic floor dysfunction, rectocele          -Chronic Pain: Has regimen of medical cannabis, tramadol,  butran, cymbalta and tizanidine. Uses medical cannabis  since 2018 which helps. Follows with Dr. Martinez.       Routine Health Maintenence:  Depression Screening:   PHQ-2 Score:     PHQ-2 ( 1999 Pfizer) 8/5/2021 4/29/2021   Q1: Little interest or pleasure in doing things 0 0   Q2: Feeling down, depressed or hopeless 0 0   PHQ-2 Score 0 0   PHQ-2 Total Score (12-17 Years)- Positive if 3 or more points; Administer PHQ-A if positive 0 0   Q1: Little interest or pleasure in doing things Not at all -   Q2: Feeling down, depressed or hopeless Not at all -   PHQ-2 Score 0 -        Immunizations (zoster, pneumovax, flu, Tdap, Hep A/B):   There is no immunization history on file for this patient.  Lipids:   Recent Labs   Lab Test 04/26/18  1010   CHOL 244*   HDL 48*   *   TRIG  156*       Lung Ca Screening (>30 pk age 55-79 or >20 py age 50-79 + RF): n/a low pack years  Colonoscopy (50-75 yrs): 3/17  , due 2027   - One small hyperplastic polyp in the sigmoid colon, removed with a cold snare. Resected and retrieved. Diverticulosis in the sigmoid colon. Repeat colonoscopy in10 years for surveillance   Dexa (>65W or 70M yrs): n/a  Mammogram (40-75 yrs): 1/18 ordered, due  Pap (21-65 yrs): 1/18 HPV neg, due 1/23  HIV/HCV if risk factors:  HIV neg 4/18  Tob/EtOH: screen neg  Lifestyle factors: reviewed  Advanced Directive: deferred      Review of external notes as documented above                   A detailed Review of Systems was performed, verified and is negative except as documented in the HPI.  All health questionnaires were reviewed, verified and relevant information documented above.    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     Cervicalgia      Chronic sinusitis 00/2007    Diagnosed with chronic pansinusitis 2016     Coagulation disorder (H)     factor 8     CSF leak     Chiari malformation     Depression      Tom-Danlos disease      Eosinophilic esophagitis 08/2018     Gastroesophageal reflux disease 3/1/2017    I have large hiatal hernia     Hiatal hernia 2016    Have adeline hiatel hernia     Hoarseness Unsure    It comes and goes     Hypertension      Hypothyroid      IBS (irritable bowel syndrome) with constipation     improved on Linzess     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     Irregular heart beat      Migraines 1/1/2007    Unsure of exact date     Nasal polyps 2013    Were revoved 03/2017, benign     Orthostatic  hypotension      Other nervous system complications 1/2014    Autonomic nervous system disorder, neuralgia, neuropathy     Swelling, mass, or lump in head and neck 08/2016    Lymph node has been growing for last year     Thyroid disease 01/01/2008     Urinary incontinence      Urinary urgency        Past Surgical History:  Past Surgical History:   Procedure Laterality Date     AS REPAIR OF NASAL SEPTUM  2009    repair broke nose     BACK SURGERY  12/09/2013  10/01/2014    Spinal fusion L4-S1, L5 moving 5/8ths in. Remove screws/rods     BIOPSY  01/01/2008 & 3/2017    mole biopsy, lymph node biopsy     COLONOSCOPY  3/2017    Colonoscopy and GI     ESOPHAGEAL IMPEDENCE FUNCTION TEST WITH 24 HOUR PH GREATER THAN 1 HOUR N/A 9/17/2019    Procedure: IMPEDANCE PH STUDY, ESOPHAGUS, 24 HOUR;  Surgeon: Raghavendra Grande MD;  Location:  GI     ESOPHAGOSCOPY, GASTROSCOPY, DUODENOSCOPY (EGD), COMBINED N/A 8/3/2018    Procedure: COMBINED ESOPHAGOSCOPY, GASTROSCOPY, DUODENOSCOPY (EGD), BIOPSY SINGLE OR MULTIPLE;;  Surgeon: Juan Woodson MD;  Location: [facility] GI     ESOPHAGOSCOPY, GASTROSCOPY, DUODENOSCOPY (EGD), COMBINED N/A 10/22/2018    Procedure: COMBINED ESOPHAGOSCOPY, GASTROSCOPY, DUODENOSCOPY (EGD), BIOPSY SINGLE OR MULTIPLE;  Surgeon: Sadia Carolina MD;  [facility]     ESOPHAGOSCOPY, GASTROSCOPY, DUODENOSCOPY (EGD), COMBINED N/A 12/17/2018    Procedure: COMBINED ESOPHAGOSCOPY, GASTROSCOPY, DUODENOSCOPY (EGD);  Surgeon: Juan Woodson MD;  Location: [facility] GI     INJECT BLOCK MEDIAL BRANCH CERVICAL/THORACIC/LUMBAR Left 9/24/2020    Procedure: BLOCK, NERVE, FACET JOINT, MEDIAL BRANCH, DIAGNOSTIC;  Surgeon: Faiza Martinez MD;  [facility]     INJECT BLOCK MEDIAL BRANCH CERVICAL/THORACIC/LUMBAR Bilateral 10/8/2020    Procedure: Bilateral cervical 3, cervical 4, and cervical 5 medial branch nerve block;  Surgeon: Faiza Martinez MD;  Location: [facility] OR     INJECT BLOCK MEDIAL BRANCH CERVICAL/THORACIC/LUMBAR Right  3/4/2021    Procedure: Cervical 3, cervical 4, cervical 5 medial branch nerve blocks to target C3-C4 and C4-C5 facet joint;  Surgeon: Faiza Martinez MD;  Location: [facility] OR     LAPAROSCOPIC HERNIORRHAPHY HIATAL N/A 2/10/2020    Procedure: LAPAROSCOPIC HIATAL HERNIA REPAIR, NISSEN FUNDOPLICATION, ESOPHOGASTRODUODENOSCOPY, PEG TUBE PLACEMENT.;  Surgeon: Alana Mack MD;  [facility]     NASAL/SINUS POLYPECTOMY  2017    Polyps were benign     NOSE SURGERY  2007    deviated septum     TONSILLECTOMY & ADENOIDECTOMY  1981     TUBAL LIGATION  07/01/2011       Active Meds:  Current Outpatient Medications   Medication     acetaminophen (TYLENOL) 500 MG tablet     amitriptyline (ELAVIL) 10 MG tablet     buprenorphine (BUTRANS) 10 MCG/HR WK patch     buprenorphine (BUTRANS) 10 MCG/HR WK patch     cetirizine (ZYRTEC) 10 MG tablet     co-enzyme Q-10 100 MG CAPS capsule     DULoxetine (CYMBALTA) 30 MG capsule     EPINEPHrine (EPIPEN/ADRENACLICK/OR ANY BX GENERIC EQUIV) 0.3 MG/0.3ML injection 2-pack     famotidine (PEPCID) 20 MG tablet     fluticasone (FLONASE) 50 MCG/ACT nasal spray     ivabradine (CORLANOR) 7.5 MG tablet     levothyroxine (SYNTHROID/LEVOTHROID) 50 MCG tablet     Lidocaine (LIDOCARE) 4 % Patch     medical cannabis (Patient's own supply.  Not a prescription)     metoclopramide (REGLAN) 10 MG tablet     Multiple Vitamins-Minerals (MULTIVITAMIN PO)     naloxone (NARCAN) 4 MG/0.1ML nasal spray     naproxen sodium (ANAPROX) 220 MG tablet     pantoprazole (PROTONIX) 40 MG EC tablet     Probiotic Product (PROBIOTIC DAILY PO)     Rhubarb (ESTROVEN COMPLETE) 4 MG TABS     rizatriptan (MAXALT-MLT) 5 MG ODT     SUMAtriptan (IMITREX STATDOSE) 6 MG/0.5ML pen injector kit     tiZANidine (ZANAFLEX) 4 MG tablet     traMADol (ULTRAM) 50 MG tablet     vitamin B complex with vitamin C (VITAMIN  B COMPLEX) TABS tablet     VITAMIN D, CHOLECALCIFEROL, PO     VITRON-C  MG TABS tablet     No current facility-administered  "medications for this visit.        Allergies:  Baclofen, Bee venom, Chicken-derived products (egg), Compazine [prochlorperazine], Food, Gabapentin, Gluten meal, Pregabalin, Seasonal allergies, and Topiramate    Family History:  family history includes Asthma in her sister and son; Cancer in her father; Connective Tissue Disorder in her mother and sister; Diabetes in her maternal grandmother, paternal grandfather, and paternal grandmother; Heart Disease in her maternal grandmother; Hypertension in her maternal grandmother; Migraines in her father, sister, sister, and sister; Thyroid Disease in her sister.    Social History:  Social History     Tobacco Use     Smoking status: Former Smoker     Packs/day: 0.20     Years: 10.00     Pack years: 2.00     Types: Cigarettes     Start date: 1991     Quit date: 2013     Years since quittin.2     Smokeless tobacco: Never Used   Substance Use Topics     Alcohol use: Yes     Comment: once a month     Drug use: Yes     Types: Marijuana     Comment: medical marijuana       Physical Exam:  Vitals: BP (!) 148/102 (BP Location: Right arm, Patient Position: Sitting, Cuff Size: Adult Regular)   Pulse 76   Ht 1.702 m (5' 7\")   Wt 80.9 kg (178 lb 6.4 oz)   LMP  (LMP Unknown)   SpO2 100%   BMI 27.94 kg/m    Constitutional: Alert, oriented, pleasant, no acute distress  Head: Normocephalic, atraumatic  Eyes: Extra-ocular movements intact, pupils equally round and reactive bilaterally, no scleral icterus  ENT: Masked  Neck: Supple, no lymphadenopathy  Cardiovascular: Regular rate and rhythm, no murmurs, rubs or gallops, peripheral pulses full/symmetric  Respiratory: Good air movement bilaterally, lungs clear, no wheezes/rales/rhonchi  Musculoskeletal: No edema, normal muscle tone, normal gait  Neurologic: Alert and oriented, cranial nerves 2-12 intact, grossly non-focal  Skin: No rashes/lesions  Psychiatric: normal mentation, affect and mood      Diagnostics:  Labs " reviewed in Epic          Assessment and Plan:  Ada was seen today for recheck.    Diagnoses and all orders for this visit:    Chronic pain of both shoulders  Suspect RC arthropathy, would like Sports Med to evaluate her given EDS and chronic pain issues  -     Orthopedic  Referral; Future    EDS (Tom-Danlos syndrome)  -     Orthopedic  Referral; Future    Insomnia due to medical condition  She has not gotten significant benefit from amitriptyline in past and is noting side effects, potentially causing elevated BP today. Given issue is more sleep maintenance insomnia, will try low dose doxepin. Advised of possible side effects.  -     doxepin (SILENOR) 3 MG tablet; Take 1 tablet (3 mg) by mouth nightly as needed for sleep    F/u if BP remains elevated.   F/u with PharmD re: pharmacogenomics.      Ellyn Rivera MD  Internal Medicine      >30 minutes spent today performing chart review, history and exam, documentation and further activities as noted above.

## 2022-03-10 NOTE — NURSING NOTE
Ada Welch is a 51 year old female patient that presents today in clinic for the following:    Chief Complaint   Patient presents with     RECHECK     The patient's allergies and medications were reviewed as noted. A set of vitals were recorded as noted without incident. The patient does not have any other questions for the provider.    Fabian Suh, EMT at 8:41 AM on 3/10/2022

## 2022-03-11 ENCOUNTER — TELEPHONE (OUTPATIENT)
Dept: INTERNAL MEDICINE | Facility: CLINIC | Age: 52
End: 2022-03-11
Payer: COMMERCIAL

## 2022-03-11 DIAGNOSIS — M96.1 LUMBAR POST-LAMINECTOMY SYNDROME: ICD-10-CM

## 2022-03-11 RX ORDER — FAMOTIDINE 20 MG/1
20 TABLET, FILM COATED ORAL AT BEDTIME
Qty: 90 TABLET | Refills: 2 | Status: SHIPPED | OUTPATIENT
Start: 2022-03-11 | End: 2022-05-17

## 2022-03-11 RX ORDER — DULOXETIN HYDROCHLORIDE 30 MG/1
CAPSULE, DELAYED RELEASE ORAL
Qty: 270 CAPSULE | Refills: 1 | Status: SHIPPED | OUTPATIENT
Start: 2022-03-11 | End: 2022-08-05

## 2022-03-11 NOTE — TELEPHONE ENCOUNTER
Prior Authorization Retail Medication Request    Medication/Dose: doxepin (SILENOR) 3 MG tablet      Insurance Name:     Coverage information:     Subscriber: [id number] DUNCAN HENAO     Rel to sub: 02 - Spouse     Member ID: [id number]     Payor: 13-[employer] Ph: [phone]     Benefit plan: 1280-[employer]ICA CHOICE Ph: [phone]     Group number: [id number]     Member effective dates: from 10/01/18

## 2022-03-11 NOTE — CONFIDENTIAL NOTE
Medication refill sent to patient's requested pharmacy [facility]. No changes. Patient last seen 1/21/22 and follow up is scheduled 3/17/22 with Dr. Martinez.      Skye Jernigan RN

## 2022-03-11 NOTE — TELEPHONE ENCOUNTER
RN called and left a voicemail in regards to refill e-sent to [facility]. Left call back number if patient has any questions.       Skye Jernigan RN

## 2022-03-17 ENCOUNTER — OFFICE VISIT (OUTPATIENT)
Dept: ANESTHESIOLOGY | Facility: CLINIC | Age: 52
End: 2022-03-17
Payer: COMMERCIAL

## 2022-03-17 VITALS — SYSTOLIC BLOOD PRESSURE: 105 MMHG | DIASTOLIC BLOOD PRESSURE: 70 MMHG | HEART RATE: 78 BPM | OXYGEN SATURATION: 97 %

## 2022-03-17 DIAGNOSIS — M96.1 POSTLAMINECTOMY SYNDROME OF LUMBOSACRAL REGION: Primary | ICD-10-CM

## 2022-03-17 PROCEDURE — 99214 OFFICE O/P EST MOD 30 MIN: CPT | Performed by: ANESTHESIOLOGY

## 2022-03-17 RX ORDER — OXYCODONE HYDROCHLORIDE 5 MG/1
5 TABLET ORAL DAILY PRN
Qty: 12 TABLET | Refills: 0 | Status: SHIPPED | OUTPATIENT
Start: 2022-03-17 | End: 2022-04-01

## 2022-03-17 ASSESSMENT — PAIN SCALES - GENERAL: PAINLEVEL: SEVERE PAIN (7)

## 2022-03-17 NOTE — LETTER
3/17/2022       RE: Ada Welch  [address] [address]     Dear Colleague,    Thank you for referring your patient, Ada Welch, to the [facility] [place] at [facility]. Please see a copy of my visit note below.    [facility] : Progress Note    Date of visit: 3/17/2022    Interval history:  Ada Welch is a 51 year old female who  is well known to me for multifocal chronic pain. The patient has  complex history of low back pain for ~25 years for which she underwent L4-S1 fusion (TLIF) 12/9/13.  Postoperatively, her back pain worsened (mainly LBP, but also radiated down both lower limbs in an S1 dermatomal distribution).   Underwent removal of posterior instrumentation (screws and guadalupe).  After fusion removal, her pain continued.  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  She even sought care from a EDS specialist in [place].  She is currently on disability.     She has a h/o significant autonomic dysfunction.  Since 2014, she reports that her heart rate has fluctuated from as low as  beats per minutes and her blood pressure also fluctuates at the same time without any aggravating factors.  The fluctuation of the blood pressure and heart rate happens when she is supine, sitting, standing, walking, etc.  She has seen cardiologist Dr. Russell who advised her to continue fludrocortisone, isometric exercise, low carbohydrate and low gluten diet.     She has been seeing Dr. Singh for urinary urgency and incontinence.  She is advised for improving lifestyle, dietary modification, optimizing bowel regimen and to start pelvic physical therapy.     She has seen Dr. Moncada for left upper extremity radicular pain along the C8 nerve root distribution.  Per Dr. Moncada's note, she does not have a surgical target which may  explain her symptoms.  She still continues to report this symptom.  We discussed about epidural steroid injection, however she is reluctant to perform the procedures and states she previously had a bad reaction with a steroid.   Patient also had cervical medial branch nerve block x2 with us 50% pain relief.   However medial branch nerve radiofrequency ablation was not approved by the insurance.     The patient has seen Dr. Mack for eosinophilic esophagitis, GERD, and dysphagia.    She had open hernia surgery by Dr. Mack in 2020.  Her postoperative course was uneventful.        The patient has been seeing Dr. Noemy Nguyen for chronic migraines. Patient reports that her chronic intractable headache pain recently got worse. 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  She has undergone multiple blood patches intermittently for her headache pain.       Currently she has been  managing her pain with stable dose of Butrans 10 mcg per hour q 7 days, tramadol 50 mg every 6 hours as needed which she refills every 2-3 months.  In addition she takes tizanidine 4 mg 4 times daily as needed.    She takes sumatriptan for breakthrough migraine.  Occasionally she takes oxycodone  when her pain gets significantly worse.      Today her main complaints are followin.  Bilateral shoulder pain (shoulder x-ray shows mild AC joint arthritis)  2.  Bilateral lower back pain (we recommended bilateral SI joint injection, which was not performed due to her Covid infection).  She plans on scheduling the SI joint injection after her upcoming [place] world trip.  3.  She requested a small dose of oxycodone for pain flare from upcoming travel.       [place] Prescription Monitoring Program:   Reviewed. No concerns     Review of Systems:  The 14 system ROS was reviewed and was negative except what is documented above and as follows.  Any bowel or bladder problems: none  Mood: okay    Physical Exam:  Vitals:     03/17/22 0854   BP: 105/70   Pulse: 78   SpO2: 97%       Medications:  Current Outpatient Medications   Medication Sig Dispense Refill     acetaminophen (TYLENOL) 500 MG tablet Take 1,000 mg by mouth every 6 hours as needed for mild pain       buprenorphine (BUTRANS) 10 MCG/HR WK patch Place 1 patch onto the skin every 7 days 4 patch 3     buprenorphine (BUTRANS) 10 MCG/HR WK patch APPLY 1 PATCH ONCE A WEEK 4 patch 2     cetirizine (ZYRTEC) 10 MG tablet Take 10 mg by mouth daily as needed        co-enzyme Q-10 100 MG CAPS capsule Take 100 mg by mouth daily       doxepin (SILENOR) 3 MG tablet Take 1 tablet (3 mg) by mouth nightly as needed for sleep 30 tablet 1     DULoxetine (CYMBALTA) 30 MG capsule Take 60mg in the morning and 30mg at bedtime 270 capsule 1     EPINEPHrine (EPIPEN/ADRENACLICK/OR ANY BX GENERIC EQUIV) 0.3 MG/0.3ML injection 2-pack INJECT 0.3 MLS (0.3 MG) INTO THE MUSCLE AS NEEDED FOR ANAPHYLAXIS  1     famotidine (PEPCID) 20 MG tablet Take 1 tablet (20 mg) by mouth At Bedtime 90 tablet 2     fluticasone (FLONASE) 50 MCG/ACT nasal spray Spray 2 sprays into both nostrils daily Call clinic to schedule follow up appointment. 48 mL 4     ivabradine (CORLANOR) 7.5 MG tablet Take 1 tab (7.5mg)  In AM and half tab (3.75mg) in afternoon 135 tablet 3     levothyroxine (SYNTHROID/LEVOTHROID) 50 MCG tablet Take 1 tablet (50 mcg) by mouth daily 90 tablet 3     Lidocaine (LIDOCARE) 4 % Patch Place 1-3 patches onto the skin every 24 hours To prevent lidocaine toxicity, patient should be patch free for 12 hrs daily. 20 patch 0     medical cannabis (Patient's own supply.  Not a prescription) Take 1 Dose by mouth See Admin Instructions (This is NOT a prescription, and does not certify that the patient has a qualifying medical condition for medical cannabis.  The purpose of this order is  to document that the patient reports taking medical cannabis.)     Capsule formulation - uses as needed (currently out of this and  not using as of January 28, 2020)       metoclopramide (REGLAN) 10 MG tablet Take 1 tablet (10 mg) by mouth 4 times daily as needed (headache) 40 tablet 3     Multiple Vitamins-Minerals (MULTIVITAMIN PO) Take 1 tablet by mouth daily        naloxone (NARCAN) 4 MG/0.1ML nasal spray Spray 1 spray (4 mg) into one nostril alternating nostrils once as needed for opioid reversal every 2-3 minutes until assistance arrives 0.2 mL 0     naproxen sodium (ANAPROX) 220 MG tablet Take 440-660 mg by mouth daily as needed for moderate pain       pantoprazole (PROTONIX) 40 MG EC tablet Take 1 tablet (40 mg) by mouth daily 90 tablet 3     Probiotic Product (PROBIOTIC DAILY PO) Take 2 packets by mouth every morning        Rhubarb (ESTROVEN COMPLETE) 4 MG TABS Take 1 tablet by mouth       rizatriptan (MAXALT-MLT) 5 MG ODT TAKE 1-2 TABLETS (5-10 MG) BY MOUTH AT ONSET OF HEADACHE FOR MIGRAINE (MAX 30 MG IN 24 HOURS) 18 tablet 11     SUMAtriptan (IMITREX STATDOSE) 6 MG/0.5ML pen injector kit Inject 0.5 ml (6 mg) subcutaneously at onset of migraine, May repeat in 1 hour , Max 12 mg/24 hrs 2 kit 11     tiZANidine (ZANAFLEX) 4 MG tablet Take 1 tablet (4 mg) by mouth 4 times daily as needed for muscle spasms 360 tablet 5     traMADol (ULTRAM) 50 MG tablet Take 1 tablet (50 mg) by mouth every 6 hours as needed for severe pain TAKE 1 TABLET BY MOUTH EVERY 6 HOURS AS NEEDED FOR SEVERE PAIN 60 tablet 3     vitamin B complex with vitamin C (VITAMIN  B COMPLEX) TABS tablet Take 1 tablet by mouth daily       VITAMIN D, CHOLECALCIFEROL, PO Take 2,000 Units by mouth daily       VITRON-C  MG TABS tablet TAKE 1 TABLET BY MOUTH EVERY OTHER  tablet 1       Analgesic Medications:   Medications related to Pain Management (From now, onward)    None             LABORATORY VALUES:   Recent Labs   Lab Test 11/13/20  1133 02/12/20  0611    139   POTASSIUM 4.5 3.6   CHLORIDE 102 106   CO2 29 27   ANIONGAP 4 6   GLC 96 106*   BUN 10 6*   CR 0.83  0.62   JUAN 8.6 8.0*       CBC RESULTS:   Recent Labs   Lab Test 11/13/20  1133   WBC 6.2   RBC 4.42   HGB 12.1   HCT 38.3   MCV 87   MCH 27.4   MCHC 31.6   RDW 14.6          Most Recent 3 INR's:  Recent Labs   Lab Test 01/30/20  1325 02/28/19  1045 12/17/18  0920   INR 0.93 0.97 0.90           ASSESSMENT:  Diagnosis  Postlaminectomy pain syndrome  Intractable headache syndrome  Cervicalgia  Cervical facet arthritis  Lumbar degenerative disc disease  Bilateral shoulder pain due to his AC joint arthritis  Sacroiliac joint dysfunction        Assessment and plan    Continue Butrans, tramadol    Ordered oxycodone 5 mg daily as needed.  15 tablets dispensed    Ordered  pool therapy    Continue amitriptyline 30 mg nightly    Duloxetine 60 mg every morning and 30 mg nightly    Discussed with her about scheduling bilateral sacroiliac joint injection.    A new order can be placed after her travel if she wants to schedule the procedure.  Risk of the procedure including bleeding, infection, failed procedure, discussed with her.    Follow-up 6 months      Faiza Martinez MD, PhD    [facility] [address]  5TH FLOOR  [facility] [id number] [phone]  Dept: [phone]

## 2022-03-17 NOTE — NURSING NOTE
Patient presents with:  RECHECK: UMP RETURN,Rm 12, patient reports, neck, shoulders, neck      Severe Pain (7)     Pain Medications     Analgesics Other Refills Start End     acetaminophen (TYLENOL) 500 MG tablet          Sig - Route: Take 1,000 mg by mouth every 6 hours as needed for mild pain - Oral    Class: Historical    Opioid Agonists Refills Start End     traMADol (ULTRAM) 50 MG tablet    3 3/8/2022     Sig - Route: Take 1 tablet (50 mg) by mouth every 6 hours as needed for severe pain TAKE 1 TABLET BY MOUTH EVERY 6 HOURS AS NEEDED FOR SEVERE PAIN - Oral    Class: E-Prescribe    Prior authorization: Closed - Other    Opioid Partial Agonists Refills Start End     buprenorphine (BUTRANS) 10 MCG/HR WK patch    3 12/24/2021     Sig - Route: Place 1 patch onto the skin every 7 days - Transdermal    Class: E-Prescribe    Prior authorization: Approved     buprenorphine (BUTRANS) 10 MCG/HR WK patch    2 9/16/2021     Sig: APPLY 1 PATCH ONCE A WEEK    Class: E-Prescribe    Prior authorization: Approved          What medications are you using for pain?   Butrans, Tramadol, Medical cannabis, acetaminophen, naproxen, tizanadine        (Return Patients only) What refills are you needing today? Tizanadine, tramadol    Uday Gardner, EMT

## 2022-03-17 NOTE — PATIENT INSTRUCTIONS
Medications:    Oxycodone prescription provided.      *Please provide the clinic with a minium of 1 week notice, on all prescription refills.       Referrals:    Physical Therapy Referral placed-   If you have not heard from the scheduling office within 2 business days, please call [phone] for all locations        Recommended Follow up:      Follow up in 2-3 months or earlier if indicated.         Please call [phone], option #1 to schedule your clinic appointment if you don't already have an appointment scheduled.        To speak with a nurse, schedule/reschedule/cancel a clinic appointment, or request a medication refill call: [phone], option #1.    You can also reach us by [employer]: [email or web address]

## 2022-03-17 NOTE — PROGRESS NOTES
[facility] : Progress Note    Date of visit: 3/17/2022    Interval history:  Ada Welch is a 51 year old female who  is well known to me for multifocal chronic pain. The patient has  complex history of low back pain for ~25 years for which she underwent L4-S1 fusion (TLIF) 12/9/13.  Postoperatively, her back pain worsened (mainly LBP, but also radiated down both lower limbs in an S1 dermatomal distribution).   Underwent removal of posterior instrumentation (screws and guadalupe).  After fusion removal, her pain continued.  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  She even sought care from a EDS specialist in [place].  She is currently on disability.     She has a h/o significant autonomic dysfunction.  Since 2014, she reports that her heart rate has fluctuated from as low as  beats per minutes and her blood pressure also fluctuates at the same time without any aggravating factors.  The fluctuation of the blood pressure and heart rate happens when she is supine, sitting, standing, walking, etc.  She has seen cardiologist Dr. Russell who advised her to continue fludrocortisone, isometric exercise, low carbohydrate and low gluten diet.     She has been seeing Dr. Singh for urinary urgency and incontinence.  She is advised for improving lifestyle, dietary modification, optimizing bowel regimen and to start pelvic physical therapy.     She has seen Dr. Moncada for left upper extremity radicular pain along the C8 nerve root distribution.  Per Dr. Moncada's note, she does not have a surgical target which may explain her symptoms.  She still continues to report this symptom.  We discussed about epidural steroid injection, however she is reluctant to perform the procedures and states she previously had a bad reaction with a steroid.   Patient also had cervical medial branch nerve block x2 with us 50% pain relief.   However medial branch nerve  radiofrequency ablation was not approved by the insurance.     The patient has seen Dr. Mack for eosinophilic esophagitis, GERD, and dysphagia.    She had open hernia surgery by Dr. Mack in 2020.  Her postoperative course was uneventful.        The patient has been seeing Dr. Noemy Nguyen for chronic migraines. Patient reports that her chronic intractable headache pain recently got worse. 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  She has undergone multiple blood patches intermittently for her headache pain.       Currently she has been  managing her pain with stable dose of Butrans 10 mcg per hour q 7 days, tramadol 50 mg every 6 hours as needed which she refills every 2-3 months.  In addition she takes tizanidine 4 mg 4 times daily as needed.    She takes sumatriptan for breakthrough migraine.  Occasionally she takes oxycodone  when her pain gets significantly worse.      Today her main complaints are followin.  Bilateral shoulder pain (shoulder x-ray shows mild AC joint arthritis)  2.  Bilateral lower back pain (we recommended bilateral SI joint injection, which was not performed due to her Covid infection).  She plans on scheduling the SI joint injection after her upcoming [place] world trip.  3.  She requested a small dose of oxycodone for pain flare from upcoming travel.       [place] Prescription Monitoring Program:   Reviewed. No concerns     Review of Systems:  The 14 system ROS was reviewed and was negative except what is documented above and as follows.  Any bowel or bladder problems: none  Mood: okay    Physical Exam:  Vitals:    22 0854   BP: 105/70   Pulse: 78   SpO2: 97%       Medications:  Current Outpatient Medications   Medication Sig Dispense Refill     acetaminophen (TYLENOL) 500 MG tablet Take 1,000 mg by mouth every 6 hours as needed for mild pain       buprenorphine (BUTRANS) 10 MCG/HR WK patch Place 1 patch onto the skin every 7 days 4 patch 3      buprenorphine (BUTRANS) 10 MCG/HR WK patch APPLY 1 PATCH ONCE A WEEK 4 patch 2     cetirizine (ZYRTEC) 10 MG tablet Take 10 mg by mouth daily as needed        co-enzyme Q-10 100 MG CAPS capsule Take 100 mg by mouth daily       doxepin (SILENOR) 3 MG tablet Take 1 tablet (3 mg) by mouth nightly as needed for sleep 30 tablet 1     DULoxetine (CYMBALTA) 30 MG capsule Take 60mg in the morning and 30mg at bedtime 270 capsule 1     EPINEPHrine (EPIPEN/ADRENACLICK/OR ANY BX GENERIC EQUIV) 0.3 MG/0.3ML injection 2-pack INJECT 0.3 MLS (0.3 MG) INTO THE MUSCLE AS NEEDED FOR ANAPHYLAXIS  1     famotidine (PEPCID) 20 MG tablet Take 1 tablet (20 mg) by mouth At Bedtime 90 tablet 2     fluticasone (FLONASE) 50 MCG/ACT nasal spray Spray 2 sprays into both nostrils daily Call clinic to schedule follow up appointment. 48 mL 4     ivabradine (CORLANOR) 7.5 MG tablet Take 1 tab (7.5mg)  In AM and half tab (3.75mg) in afternoon 135 tablet 3     levothyroxine (SYNTHROID/LEVOTHROID) 50 MCG tablet Take 1 tablet (50 mcg) by mouth daily 90 tablet 3     Lidocaine (LIDOCARE) 4 % Patch Place 1-3 patches onto the skin every 24 hours To prevent lidocaine toxicity, patient should be patch free for 12 hrs daily. 20 patch 0     medical cannabis (Patient's own supply.  Not a prescription) Take 1 Dose by mouth See Admin Instructions (This is NOT a prescription, and does not certify that the patient has a qualifying medical condition for medical cannabis.  The purpose of this order is  to document that the patient reports taking medical cannabis.)     Capsule formulation - uses as needed (currently out of this and not using as of January 28, 2020)       metoclopramide (REGLAN) 10 MG tablet Take 1 tablet (10 mg) by mouth 4 times daily as needed (headache) 40 tablet 3     Multiple Vitamins-Minerals (MULTIVITAMIN PO) Take 1 tablet by mouth daily        naloxone (NARCAN) 4 MG/0.1ML nasal spray Spray 1 spray (4 mg) into one nostril alternating nostrils  once as needed for opioid reversal every 2-3 minutes until assistance arrives 0.2 mL 0     naproxen sodium (ANAPROX) 220 MG tablet Take 440-660 mg by mouth daily as needed for moderate pain       pantoprazole (PROTONIX) 40 MG EC tablet Take 1 tablet (40 mg) by mouth daily 90 tablet 3     Probiotic Product (PROBIOTIC DAILY PO) Take 2 packets by mouth every morning        Rhubarb (ESTROVEN COMPLETE) 4 MG TABS Take 1 tablet by mouth       rizatriptan (MAXALT-MLT) 5 MG ODT TAKE 1-2 TABLETS (5-10 MG) BY MOUTH AT ONSET OF HEADACHE FOR MIGRAINE (MAX 30 MG IN 24 HOURS) 18 tablet 11     SUMAtriptan (IMITREX STATDOSE) 6 MG/0.5ML pen injector kit Inject 0.5 ml (6 mg) subcutaneously at onset of migraine, May repeat in 1 hour , Max 12 mg/24 hrs 2 kit 11     tiZANidine (ZANAFLEX) 4 MG tablet Take 1 tablet (4 mg) by mouth 4 times daily as needed for muscle spasms 360 tablet 5     traMADol (ULTRAM) 50 MG tablet Take 1 tablet (50 mg) by mouth every 6 hours as needed for severe pain TAKE 1 TABLET BY MOUTH EVERY 6 HOURS AS NEEDED FOR SEVERE PAIN 60 tablet 3     vitamin B complex with vitamin C (VITAMIN  B COMPLEX) TABS tablet Take 1 tablet by mouth daily       VITAMIN D, CHOLECALCIFEROL, PO Take 2,000 Units by mouth daily       VITRON-C  MG TABS tablet TAKE 1 TABLET BY MOUTH EVERY OTHER  tablet 1       Analgesic Medications:   Medications related to Pain Management (From now, onward)    None             LABORATORY VALUES:   Recent Labs   Lab Test 11/13/20  1133 02/12/20  0611    139   POTASSIUM 4.5 3.6   CHLORIDE 102 106   CO2 29 27   ANIONGAP 4 6   GLC 96 106*   BUN 10 6*   CR 0.83 0.62   JUAN 8.6 8.0*       CBC RESULTS:   Recent Labs   Lab Test 11/13/20  1133   WBC 6.2   RBC 4.42   HGB 12.1   HCT 38.3   MCV 87   MCH 27.4   MCHC 31.6   RDW 14.6          Most Recent 3 INR's:  Recent Labs   Lab Test 01/30/20  1325 02/28/19  1045 12/17/18  0920   INR 0.93 0.97 0.90            ASSESSMENT:  Diagnosis  Postlaminectomy pain syndrome  Intractable headache syndrome  Cervicalgia  Cervical facet arthritis  Lumbar degenerative disc disease  Bilateral shoulder pain due to his AC joint arthritis  Sacroiliac joint dysfunction        Assessment and plan    Continue Butrans, tramadol    Ordered oxycodone 5 mg daily as needed.  15 tablets dispensed    Ordered  pool therapy    Continue amitriptyline 30 mg nightly    Duloxetine 60 mg every morning and 30 mg nightly    Discussed with her about scheduling bilateral sacroiliac joint injection.    A new order can be placed after her travel if she wants to schedule the procedure.  Risk of the procedure including bleeding, infection, failed procedure, discussed with her.    Follow-up 6 months      Faiza Martinez MD, PhD    [facility] [address]  5TH FLOOR  [facility] [address] [phone]  Dept: [phone]

## 2022-03-19 NOTE — TELEPHONE ENCOUNTER
Prior Authorization Approval        Authorization Effective Date: 2/14/2022  Authorization Expiration Date: 3/16/2023  Medication: doxepin (SILENOR) 3 MG tablet-PA APPROVED  Approved Dose/Quantity:   Reference #:     Insurance Company: [employer] - Phone [phone] Fax [phone]  Expected CoPay: $300      CoPay Card Available:      Foundation Assistance Needed:    Which Pharmacy is filling the prescription (Not needed for infusion/clinic administered): [facility] [id number] [place] [address]  Pharmacy Notified: Yes- **Instructed pharmacy to notify patient when script is ready to /ship.**-Pharmacy stated that patient has a secondary insurance [employer] ([employer]), which also requires PA. Open New Encounter for Secondary Insurance [employer].   Patient Notified: Yes

## 2022-03-20 ENCOUNTER — HEALTH MAINTENANCE LETTER (OUTPATIENT)
Age: 52
End: 2022-03-20

## 2022-04-04 DIAGNOSIS — K44.9 HIATAL HERNIA: ICD-10-CM

## 2022-04-05 ENCOUNTER — NURSE TRIAGE (OUTPATIENT)
Dept: ONCOLOGY | Facility: CLINIC | Age: 52
End: 2022-04-05
Payer: COMMERCIAL

## 2022-04-05 RX ORDER — PANTOPRAZOLE SODIUM 40 MG/1
40 TABLET, DELAYED RELEASE ORAL DAILY
Qty: 90 TABLET | Refills: 3 | Status: SHIPPED | OUTPATIENT
Start: 2022-04-05 | End: 2022-05-17

## 2022-04-05 NOTE — TELEPHONE ENCOUNTER
[facility] Triage    Incoming call: has stomach flu still    Upper GI scheduled for 4/6 and visit with you on 4/7 - should she move forward with tomorrow's upper GI    No vomiting in last 24 hours  Fever of 100.5  Continues to have diarrhea  Keeping fluids down in the last 24 hours  Keeping oatmeal and bananas down, everything else is painful to eat.      Routing to Lovely Bland  Paging Lovely Bland 929 am    Lovely returned call and she can reschedule next available for both Lovely and the Upper GI.    941 am spoke to Ada, relayed information and she was tranferred to scheduling to cancel her appts and reschedule next available.

## 2022-04-21 ENCOUNTER — ANCILLARY PROCEDURE (OUTPATIENT)
Dept: GENERAL RADIOLOGY | Facility: CLINIC | Age: 52
End: 2022-04-21
Attending: CLINICAL NURSE SPECIALIST
Payer: COMMERCIAL

## 2022-04-21 DIAGNOSIS — K44.9 HIATAL HERNIA: ICD-10-CM

## 2022-04-21 PROCEDURE — 74240 X-RAY XM UPR GI TRC 1CNTRST: CPT | Mod: GC | Performed by: RADIOLOGY

## 2022-04-27 ENCOUNTER — TELEPHONE (OUTPATIENT)
Dept: ANESTHESIOLOGY | Facility: CLINIC | Age: 52
End: 2022-04-27
Payer: COMMERCIAL

## 2022-04-27 DIAGNOSIS — M96.1 POST LAMINECTOMY SYNDROME: ICD-10-CM

## 2022-04-27 NOTE — TELEPHONE ENCOUNTER
[facility] Call Center    Phone Message    May a detailed message be left on voicemail: yes     Reason for Call: Medication Refill Request    Has the patient contacted the pharmacy for the refill? Yes   Name of medication being requested: tiZANidine (ZANAFLEX) 4 MG tablet  Provider who prescribed the medication: Dr Martinez  Pharmacy:    [facility] [id number] [address]      Date medication is needed: within this next week         Action Taken: Message routed to:  [facility]    Travel Screening: Not Applicable

## 2022-04-28 ENCOUNTER — TELEPHONE (OUTPATIENT)
Dept: SURGERY | Facility: CLINIC | Age: 52
End: 2022-04-28

## 2022-04-28 NOTE — CONFIDENTIAL NOTE
Medication refill sent to patient's requested pharmacy. No changes. Patient last seen 3/17/22.       Skye Jernigan RN

## 2022-05-05 DIAGNOSIS — E61.1 IRON DEFICIENCY: ICD-10-CM

## 2022-05-06 RX ORDER — BACILLUS COAGULANS 1B CELL
1 CAPSULE ORAL EVERY OTHER DAY
Qty: 60 TABLET | Refills: 4 | Status: SHIPPED | OUTPATIENT
Start: 2022-05-06 | End: 2023-09-26

## 2022-05-17 ENCOUNTER — VIRTUAL VISIT (OUTPATIENT)
Dept: NEUROLOGY | Facility: CLINIC | Age: 52
End: 2022-05-17
Payer: COMMERCIAL

## 2022-05-17 DIAGNOSIS — M54.2 CERVICALGIA: ICD-10-CM

## 2022-05-17 DIAGNOSIS — R20.0 ARM NUMBNESS: ICD-10-CM

## 2022-05-17 DIAGNOSIS — G43.719 INTRACTABLE CHRONIC MIGRAINE WITHOUT AURA AND WITHOUT STATUS MIGRAINOSUS: ICD-10-CM

## 2022-05-17 DIAGNOSIS — R51.0 POSITIONAL HEADACHE: Primary | ICD-10-CM

## 2022-05-17 PROCEDURE — 99215 OFFICE O/P EST HI 40 MIN: CPT | Mod: 95 | Performed by: PSYCHIATRY & NEUROLOGY

## 2022-05-17 RX ORDER — METOCLOPRAMIDE 10 MG/1
10 TABLET ORAL 4 TIMES DAILY PRN
Qty: 40 TABLET | Refills: 3 | Status: SHIPPED | OUTPATIENT
Start: 2022-05-17 | End: 2024-02-17

## 2022-05-17 RX ORDER — RIZATRIPTAN BENZOATE 5 MG/1
TABLET, ORALLY DISINTEGRATING ORAL
Qty: 18 TABLET | Refills: 11 | Status: SHIPPED | OUTPATIENT
Start: 2022-05-17 | End: 2023-07-11

## 2022-05-17 RX ORDER — CEFUROXIME AXETIL 250 MG/1
TABLET ORAL
Qty: 2 KIT | Refills: 11 | Status: SHIPPED | OUTPATIENT
Start: 2022-05-17 | End: 2023-07-11

## 2022-05-17 ASSESSMENT — HEADACHE IMPACT TEST (HIT 6)
HOW OFTEN DO HEADACHES LIMIT YOUR DAILY ACTIVITIES: VERY OFTEN
HOW OFTEN DID HEADACHS LIMIT CONCENTRATION ON WORK OR DAILY ACTIVITY: VERY OFTEN
HIT6 TOTAL SCORE: 70
WHEN YOU HAVE A HEADACHE HOW OFTEN DO YOU WISH YOU COULD LIE DOWN: ALWAYS
HOW OFTEN HAVE YOU FELT TOO TIRED TO WORK BECAUSE OF YOUR HEADACHES: VERY OFTEN
WHEN YOU HAVE HEADACHES HOW OFTEN IS THE PAIN SEVERE: VERY OFTEN
HOW OFTEN HAVE YOU FELT FED UP OR IRRITATED BECAUSE OF YOUR HEADACHES: ALWAYS
WHEN YOU HAVE HEADACHES HOW OFTEN IS THE PAIN SEVERE: VERY OFTEN
HOW OFTEN DID HEADACHS LIMIT CONCENTRATION ON WORK OR DAILY ACTIVITY: VERY OFTEN
HIT6 TOTAL SCORE: 70
WHEN YOU HAVE A HEADACHE HOW OFTEN DO YOU WISH YOU COULD LIE DOWN: ALWAYS
HOW OFTEN DO HEADACHES LIMIT YOUR DAILY ACTIVITIES: VERY OFTEN
HOW OFTEN HAVE YOU FELT TOO TIRED TO WORK BECAUSE OF YOUR HEADACHES: VERY OFTEN
HOW OFTEN HAVE YOU FELT FED UP OR IRRITATED BECAUSE OF YOUR HEADACHES: ALWAYS

## 2022-05-17 NOTE — LETTER
5/17/2022       RE: Ada Welch  [address] [address]     Dear Colleague,    Thank you for referring your patient, Ada Welch, to the [facility] [place] at [facility]. Please see a copy of my visit note below.          [facility]    Ada Welch MRN# [id number]   Age: 51 year old YOB: 1970     Requesting physician: Noemy Jimenez*  Ellyn Rivera            Assessment and Plan:     (R51.0) Positional headache  (primary encounter diagnosis)  Comment: Reassess for CSF leak. In past blood patch worked could consider again  Plan: MRI Brain w & w/o contrast            (R20.0) Arm numbness  Comment: Reassess neck to see if there is worsening compression  Plan: MRI Cervical spine w/o contrast, X-ray Cervical        spine 2-3 vws            (M54.2) Cervicalgia  Comment: Re-evaluate for worsening  Plan: MRI Cervical spine w/o contrast, X-ray Cervical        spine 2-3 vws            (G43.719) Intractable chronic migraine without aura and without status migrainosus  Comment: Intractable chronic migraine  Plan: Botulinum Toxin Type A (BOTOX) 200 units injection 155 Units   Refilled rizatriptan and sumatriptan  Consider Ubrelvy if needed in future    Over 45 minutes caring for the patient on the day of the visit.    Noemy Nguyen MD             History of Present Illness:   CC: Recheck    Ada is a 51 year old  being seen for chronic migraine follow up. She also has hypermobility that is suspected to be Tom Danlos but not confirmed. Could just be hypermobility. She had suspected CSF leak in past, responded to one blood patch but not a second. She has never had MRI findings of meningeal thickening to confirm. No leak site found.      She reports she has different types of headaches  1. Migraines-mainly vision issues, minimal pain more so wavy lines in vision Earlier this winter she had for  one a week. Harder to spot because no pain..  2. Sleep deprived headaches-pressure on whole head, stay until she can sleep, doesn't go away if not sleeping. Corlanor makes this type worse  3. Headaches-start in back of head. Comes from neck and degenerative changes. If she does exercises wrong or too much it makes her worse, too little makes her worse. She has been told she needs to have C4-5-6 fusion by a neurosurgeon in [facility]. C but Dr Moncada did not feel she needed surgery . She is a little overwhelmed on next steps. She has hypermobility not sure if true EDS.              Physical Exam:     Awake, alert  No aphasia or dysarthria  Face symmetric.          Pertinent History/Data for appointment:           Sincerely,    Noemy Nguyen MD

## 2022-05-17 NOTE — PROGRESS NOTES
Ada is a 51 year old who is being evaluated via a billable video visit.      How would you like to obtain your AVS? MyChart  If the video visit is dropped, the invitation should be resent by: Text to cell phone: [phone]  Will anyone else be joining your video visit? No    Video Start Time: 2:50 PM  Video-Visit Details    Type of service:  Video Visit    Video End Time: 3:25PM    Originating Location (pt. Location): Home    Distant Location (provider location):  [facility] [place]     Platform used for Video Visit: [employer] [place] [facility]    Ada Welch MRN# [id number]   Age: 51 year old YOB: 1970     Requesting physician: Noemy Jimenez*  Ellyn Rivera            Assessment and Plan:     (R51.0) Positional headache  (primary encounter diagnosis)  Comment: Reassess for CSF leak. In past blood patch worked could consider again  Plan: MRI Brain w & w/o contrast            (R20.0) Arm numbness  Comment: Reassess neck to see if there is worsening compression  Plan: MRI Cervical spine w/o contrast, X-ray Cervical        spine 2-3 vws            (M54.2) Cervicalgia  Comment: Re-evaluate for worsening  Plan: MRI Cervical spine w/o contrast, X-ray Cervical        spine 2-3 vws            (G43.719) Intractable chronic migraine without aura and without status migrainosus  Comment: Intractable chronic migraine  Plan: Botulinum Toxin Type A (BOTOX) 200 units injection 155 Units   Refilled rizatriptan and sumatriptan  Consider Ubrelvy if needed in future    Over 45 minutes caring for the patient on the day of the visit.    Noemy Nguyen MD             History of Present Illness:   CC: Recheck    Ada is a 51 year old  being seen for chronic migraine follow up. She also has hypermobility that is suspected to be Tom Danlos but not confirmed. Could just be hypermobility. She had suspected CSF leak in past, responded to one blood patch but not a second.  She has never had MRI findings of meningeal thickening to confirm. No leak site found.      She reports she has different types of headaches  1. Migraines-mainly vision issues, minimal pain more so wavy lines in vision Earlier this winter she had for one a week. Harder to spot because no pain..  2. Sleep deprived headaches-pressure on whole head, stay until she can sleep, doesn't go away if not sleeping. Corlanor makes this type worse  3. Headaches-start in back of head. Comes from neck and degenerative changes. If she does exercises wrong or too much it makes her worse, too little makes her worse. She has been told she needs to have C4-5-6 fusion by a neurosurgeon in [facility] but Dr Moncada did not feel she needed surgery . She is a little overwhelmed on next steps. She has hypermobility not sure if true EDS.              Physical Exam:     Awake, alert  No aphasia or dysarthria  Face symmetric.          Pertinent History/Data for appointment:

## 2022-05-31 ENCOUNTER — OFFICE VISIT (OUTPATIENT)
Dept: NEUROLOGY | Facility: CLINIC | Age: 52
End: 2022-05-31
Payer: COMMERCIAL

## 2022-05-31 DIAGNOSIS — G43.719 INTRACTABLE CHRONIC MIGRAINE WITHOUT AURA AND WITHOUT STATUS MIGRAINOSUS: Primary | ICD-10-CM

## 2022-05-31 PROCEDURE — 64615 CHEMODENERV MUSC MIGRAINE: CPT | Performed by: PSYCHIATRY & NEUROLOGY

## 2022-05-31 NOTE — LETTER
5/31/2022       RE: Ada Welch  [address] [facility] [address]     Dear Colleague,    Thank you for referring your patient, Ada Welch, to the [facility] [place] at [facility]. Please see a copy of my visit note below.        [facility]    Ada Welch MRN# [id number]   Age: 51 year old YOB: 1970     Botulinum Toxin Procedure Note    The procedure was explained to the patient. Benefits of the treatment were discussed including headache and migraine reduction. Risks of the procedure were reviewed including but not limited to pain, bruising, bleeding, infection, weakness of muscles injected or those distal to injection. The patient voiced understanding of the risks and benefits. All questions answered and the patient consented to proceed.     Prior to receiving Botox injections the patient reported the following:  Baseline headache frequency: > 15 days/month  Baseline headache duration: > 4 hours  Associated migrainous features: wavy vision    Previous treatment trials: amitriptyline, zonnisamide, gabapentin, Aimovig, Emgality, duloxetine, tizanidine    Last Botox procedure: hasn't had for two years  Current migraine frequency: see above  Current migraine duration: see above    A 200 unit vial of Botox was diluted with 4ml of 0.9% sodium chloride    Botox lot # and expiration date: See MAR for details  0.9% sodium chloride lot # and expiration date: See MAR for details    The following muscles were injected using a 30 gauge needle:  Procerus 1 site 5 units   2 sites (bilat) 10 units  Frontalis 4 sites (bilat) 20 units  Temporalis 8 sites (bilat) 40 units  Trapezius muscles 6 sites (bilat) 30 units  Occipitalis 6 sites (bilat) 30 units  Cervical paraspinals not injected due to hypermobility  Masseter 2 sites (bilat) 10 units    A total of 155 units were injected, 45 wasted.   The patient  tolerated the injections well. I was present for and performed the entire procedure.       Noemy Nguyen MD

## 2022-05-31 NOTE — PROGRESS NOTES
[facility]    Ada Welch MRN# [id number]   Age: 51 year old YOB: 1970     Botulinum Toxin Procedure Note    The procedure was explained to the patient. Benefits of the treatment were discussed including headache and migraine reduction. Risks of the procedure were reviewed including but not limited to pain, bruising, bleeding, infection, weakness of muscles injected or those distal to injection. The patient voiced understanding of the risks and benefits. All questions answered and the patient consented to proceed.     Prior to receiving Botox injections the patient reported the following:  Baseline headache frequency: > 15 days/month  Baseline headache duration: > 4 hours  Associated migrainous features: wavy vision    Previous treatment trials: amitriptyline, zonnisamide, gabapentin, Aimovig, Emgality, duloxetine, tizanidine    Last Botox procedure: hasn't had for two years  Current migraine frequency: see above  Current migraine duration: see above    A 200 unit vial of Botox was diluted with 4ml of 0.9% sodium chloride    Botox lot # and expiration date: See MAR for details  0.9% sodium chloride lot # and expiration date: See MAR for details    The following muscles were injected using a 30 gauge needle:  Procerus 1 site 5 units   2 sites (bilat) 10 units  Frontalis 4 sites (bilat) 20 units  Temporalis 8 sites (bilat) 40 units  Trapezius muscles 6 sites (bilat) 30 units  Occipitalis 6 sites (bilat) 30 units  Cervical paraspinals not injected due to hypermobility  Masseter 2 sites (bilat) 10 units    A total of 155 units were injected, 45 wasted.   The patient tolerated the injections well. I was present for and performed the entire procedure.     Noemy Nguyen MD

## 2022-06-10 ENCOUNTER — MYC MEDICAL ADVICE (OUTPATIENT)
Dept: ANESTHESIOLOGY | Facility: CLINIC | Age: 52
End: 2022-06-10
Payer: COMMERCIAL

## 2022-06-10 DIAGNOSIS — M96.1 POSTLAMINECTOMY SYNDROME: ICD-10-CM

## 2022-06-14 ENCOUNTER — TELEPHONE (OUTPATIENT)
Dept: ANESTHESIOLOGY | Facility: CLINIC | Age: 52
End: 2022-06-14
Payer: COMMERCIAL

## 2022-06-14 RX ORDER — TRAMADOL HYDROCHLORIDE 50 MG/1
50 TABLET ORAL EVERY 6 HOURS PRN
Qty: 60 TABLET | Refills: 1 | Status: SHIPPED | OUTPATIENT
Start: 2022-06-14 | End: 2022-08-03

## 2022-06-14 RX ORDER — BUPRENORPHINE 10 UG/H
1 PATCH TRANSDERMAL
Qty: 4 PATCH | Refills: 1 | Status: SHIPPED | OUTPATIENT
Start: 2022-06-14 | End: 2022-08-19

## 2022-06-14 NOTE — TELEPHONE ENCOUNTER
[facility] Call Center    Phone Message    May a detailed message be left on voicemail: yes     Reason for Call: Medication Refill Request    Has the patient contacted the pharmacy for the refill? Yes   Name of medication being requested: traMADol (ULTRAM) 50 MG tablet and buprenorphine (BUTRANS) 10 MCG/HR WK patch  Provider who prescribed the medication: Dr Martinez  Pharmacy: [facility] [id number] [address]  Date medication is needed: ASAP, per pt is completely out of the above meds and can't get in until 7/14 to see Dr Martinez. Please assist the patient.         Action Taken: Message routed to:  [facility]     Travel Screening: Not Applicable

## 2022-06-14 NOTE — TELEPHONE ENCOUNTER
Two Medication refills requested.    Medication: Butrans, and Tramadol      Last clinic appointment: 3/17/22  Next clinic appointment: Not scheduled. Will reach out to make appointment.    Last Drug Screen Collected: 8/5/21  Controlled Substance Agreement signed: 8/5/21      Preferred pharmacy:    [facility] [id number] [address]    Refill request routed to Dr. Martinez.     Mya Cash LPN

## 2022-06-15 ENCOUNTER — TELEPHONE (OUTPATIENT)
Dept: ANESTHESIOLOGY | Facility: CLINIC | Age: 52
End: 2022-06-15
Payer: COMMERCIAL

## 2022-06-15 DIAGNOSIS — J31.0 CHRONIC RHINITIS: ICD-10-CM

## 2022-06-15 NOTE — CONFIDENTIAL NOTE
Prior Authorization Retail Medication Request    Medication/Dose: buprenorphine (BUTRANS) 10 MCG/HR WK patch  ICD code (if different than what is on RX):    Previously Tried and Failed:    Rationale:      Insurance Name:    Insurance ID:        Pharmacy Information (if different than what is on RX)  Name:    Phone:        Skye Jernigan RN

## 2022-06-15 NOTE — TELEPHONE ENCOUNTER
Prior Authorization Approval        Authorization Effective Date: 5/16/2022  Authorization Expiration Date: 6/15/2023  Medication: buprenorphine (BUTRANS) 10 MCG/HR WK patch-PA APPROVED   Approved Dose/Quantity:  Reference #:     Insurance Company: [employer] - Phone [phone] Fax [phone]  Expected CoPay: $250     CoPay Card Available:      Foundation Assistance Needed:    Which Pharmacy is filling the prescription (Not needed for infusion/clinic administered): [facility] [id number] [place] [address]  Pharmacy Notified: Yes- Called pharmacy and Daisy stated patient paid cash/out of pocket with coupon applied on 6/14/2022 with co-pay $127.85 and patient had picked up medication. Daisy stated that when processing to patients insurance co-pay is $250, which is higher compared to coupon used. Pharmacy stated that co-pay is high through patients insurance possibly due to deductible and/or coverage gap. Requested pharmacy to notify patient that PA is Approved; however, co-pay is high. Cash price Quantity 4 patches is $253.   Patient Notified: Yes

## 2022-06-15 NOTE — TELEPHONE ENCOUNTER
Central Prior Authorization Team   Phone: [phone]    PA Initiation    Medication: buprenorphine (BUTRANS) 10 MCG/HR WK patch  Insurance Company: [employer] - Phone [phone] Fax [phone]  Pharmacy Filling the Rx: [facility] [id number] [place] [facility] [address]  Filling Pharmacy Phone: [phone]  Filling Pharmacy Fax: [phone]  Start Date: 6/15/2022

## 2022-06-18 NOTE — TELEPHONE ENCOUNTER
FLUTICASONE PROP 50 MCG SPRAY      Last Written Prescription Date:  5/20/21  Last Fill Quantity: 48 ml,   # refills: 4  Last Office Visit : 1/28/20  Follow-up: Return if symptoms worsen or fail to improve.   Future Office visit:  None scheduled    Routing refill request to provider for review/approval because:  Continue to refill or defer to primary?

## 2022-06-20 RX ORDER — FLUTICASONE PROPIONATE 50 MCG
2 SPRAY, SUSPENSION (ML) NASAL DAILY
Qty: 48 ML | Refills: 11 | Status: SHIPPED | OUTPATIENT
Start: 2022-06-20 | End: 2023-07-25

## 2022-06-21 ENCOUNTER — TELEPHONE (OUTPATIENT)
Dept: ANESTHESIOLOGY | Facility: CLINIC | Age: 52
End: 2022-06-21

## 2022-06-21 NOTE — TELEPHONE ENCOUNTER
Central Prior Authorization Team   Phone: [phone]      PA Initiation via fax    Medication: DULoxetine (CYMBALTA) 30 MG capsule  Insurance Company: [employer] - Phone [phone] Fax [phone]  Pharmacy Filling the Rx: [facility] - [place], [place] - [address] [facility], [address]  Filling Pharmacy Phone: [phone]  Filling Pharmacy Fax:    Start Date: 6/21/2022

## 2022-06-21 NOTE — CONFIDENTIAL NOTE
Prior Authorization Retail Medication Request    Medication/Dose: DULoxetine (CYMBALTA) 30 MG capsule  ICD code (if different than what is on RX):    Previously Tried and Failed:    Rationale:      Insurance Name:    Insurance ID:        Pharmacy Information (if different than what is on RX)  Name:    Phone:        Skye Jernigan RN

## 2022-06-23 NOTE — TELEPHONE ENCOUNTER
Called [employer] to check on PA status. Per rep, they do not see one that was submitted that we had faxed. Rep was able to initiated a new PA over the phone. Case# [id number].

## 2022-06-24 ENCOUNTER — ANCILLARY PROCEDURE (OUTPATIENT)
Dept: MAMMOGRAPHY | Facility: CLINIC | Age: 52
End: 2022-06-24
Attending: INTERNAL MEDICINE
Payer: COMMERCIAL

## 2022-06-24 ENCOUNTER — OFFICE VISIT (OUTPATIENT)
Dept: NEUROLOGY | Facility: CLINIC | Age: 52
End: 2022-06-24
Payer: COMMERCIAL

## 2022-06-24 ENCOUNTER — LAB (OUTPATIENT)
Dept: LAB | Facility: CLINIC | Age: 52
End: 2022-06-24
Payer: COMMERCIAL

## 2022-06-24 VITALS
SYSTOLIC BLOOD PRESSURE: 122 MMHG | BODY MASS INDEX: 28.66 KG/M2 | WEIGHT: 183 LBS | RESPIRATION RATE: 16 BRPM | HEART RATE: 78 BPM | OXYGEN SATURATION: 97 % | DIASTOLIC BLOOD PRESSURE: 84 MMHG

## 2022-06-24 DIAGNOSIS — G25.71 AKATHISIA: ICD-10-CM

## 2022-06-24 DIAGNOSIS — Z12.31 ENCOUNTER FOR SCREENING MAMMOGRAM FOR BREAST CANCER: ICD-10-CM

## 2022-06-24 DIAGNOSIS — G25.71 AKATHISIA: Primary | ICD-10-CM

## 2022-06-24 LAB
BASOPHILS # BLD AUTO: 0.1 10E3/UL (ref 0–0.2)
BASOPHILS NFR BLD AUTO: 1 %
EOSINOPHIL # BLD AUTO: 0.2 10E3/UL (ref 0–0.7)
EOSINOPHIL NFR BLD AUTO: 2 %
ERYTHROCYTE [DISTWIDTH] IN BLOOD BY AUTOMATED COUNT: 12.1 % (ref 10–15)
FERRITIN SERPL-MCNC: 21 NG/ML (ref 8–252)
HCT VFR BLD AUTO: 43.5 % (ref 35–47)
HGB BLD-MCNC: 14.8 G/DL (ref 11.7–15.7)
IMM GRANULOCYTES # BLD: 0 10E3/UL
IMM GRANULOCYTES NFR BLD: 0 %
IRON SATN MFR SERPL: 28 % (ref 15–46)
IRON SERPL-MCNC: 116 UG/DL (ref 35–180)
LYMPHOCYTES # BLD AUTO: 1.5 10E3/UL (ref 0.8–5.3)
LYMPHOCYTES NFR BLD AUTO: 23 %
MCH RBC QN AUTO: 31.2 PG (ref 26.5–33)
MCHC RBC AUTO-ENTMCNC: 34 G/DL (ref 31.5–36.5)
MCV RBC AUTO: 92 FL (ref 78–100)
MONOCYTES # BLD AUTO: 0.4 10E3/UL (ref 0–1.3)
MONOCYTES NFR BLD AUTO: 6 %
NEUTROPHILS # BLD AUTO: 4.4 10E3/UL (ref 1.6–8.3)
NEUTROPHILS NFR BLD AUTO: 68 %
NRBC # BLD AUTO: 0 10E3/UL
NRBC BLD AUTO-RTO: 0 /100
PLATELET # BLD AUTO: 238 10E3/UL (ref 150–450)
RBC # BLD AUTO: 4.74 10E6/UL (ref 3.8–5.2)
TIBC SERPL-MCNC: 413 UG/DL (ref 240–430)
WBC # BLD AUTO: 6.5 10E3/UL (ref 4–11)

## 2022-06-24 PROCEDURE — 77067 SCR MAMMO BI INCL CAD: CPT | Mod: GC | Performed by: STUDENT IN AN ORGANIZED HEALTH CARE EDUCATION/TRAINING PROGRAM

## 2022-06-24 PROCEDURE — 99417 PROLNG OP E/M EACH 15 MIN: CPT | Performed by: PSYCHIATRY & NEUROLOGY

## 2022-06-24 PROCEDURE — 77063 BREAST TOMOSYNTHESIS BI: CPT | Mod: GC | Performed by: STUDENT IN AN ORGANIZED HEALTH CARE EDUCATION/TRAINING PROGRAM

## 2022-06-24 PROCEDURE — 36415 COLL VENOUS BLD VENIPUNCTURE: CPT | Performed by: PATHOLOGY

## 2022-06-24 PROCEDURE — 85025 COMPLETE CBC W/AUTO DIFF WBC: CPT | Performed by: PATHOLOGY

## 2022-06-24 PROCEDURE — 83550 IRON BINDING TEST: CPT | Performed by: PATHOLOGY

## 2022-06-24 PROCEDURE — 82728 ASSAY OF FERRITIN: CPT | Performed by: PATHOLOGY

## 2022-06-24 PROCEDURE — 99215 OFFICE O/P EST HI 40 MIN: CPT | Performed by: PSYCHIATRY & NEUROLOGY

## 2022-06-24 ASSESSMENT — PAIN SCALES - GENERAL: PAINLEVEL: SEVERE PAIN (6)

## 2022-06-24 NOTE — PATIENT INSTRUCTIONS
Some of your reported feelings of restlessness at night could be related to some of the following issues:  - Periodic limb movement  - Akathisia caused by medications  - Low iron stores or iron in general    I would like you to obtain the following tests or perform the following actions:  - Sleep medicine referral (tiredness of mentation, but restlessness of body). Consider sleep psychology.  - Monitor duloxetine and metoclopramide use along with akithisia  - Ferritin, Iron and iron binding, CBC

## 2022-06-24 NOTE — LETTER
6/24/2022       RE: Ada Welch  [address] [facility] [address]     Dear Colleague,    Thank you for referring your patient, Ada Welch, to the [facility]. Please see a copy of my visit note below.    [facility] Neurology Consultation    Ada Welch MRN# [id number]   Age: 51 year old YOB: 1970     Requesting physician: Ellyn Lyon     Reason for Consultation: numbness in legs      History of Presenting Symptoms:   Ada Welch is a 51 year old female who presents today for evaluation of numbness in legs.  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     The patient has an exteremly complex neurological medical history including evaluations from multiple providers in just the [facility].  Upon chart review today:  - Excellent summary of multiple issues was noted 3/10/2022 with Dr. Rivera (note reviewed today).  - The patient is well followed with Dr. Noemy Nguyen of [facility] neurology for migraine management, as well as prior evaluations for issues related to suspected CSF leak (reviewed notes dated 10/28/2019, 5/17/2022, and 5/31/2022).  She was still undergoing treatment for migraines with vision impairment and sleep deprived headaches. Treatment of migraines was to be done with botox, which occurred 5/31/2022.  Prior use of blood patches wasn't necessarily helpful for headache.  Issues of cervicalgia as well as arm numbness were reported to be worsening, and she was to have MRI brain and MRI cervical spine 7/9/2022 following XR cervical spine results on 5/31/202 showing persistant anterolisthesis of C3-4 and C4-5 along with degenerative changes at C5-6-7.  - She was seen with Dr. Russell and Dr. Isbell  "for ongoing management of POTS (10/2/2019 note reviewed today).    - She has been evaluated with Dr. Moncada of neurosurgery multiple times for left arm sensory issues and cervicalgia as well as headache concerns possibly stemming from CSF leak or Chiari. (11/30/217, 1/3/2020). It was noted that she doesn't need a C4-5-6 fusion in a second opinion comapred to NSGY opinion from a [facility] provider. It was also noted she did not respond at all to selective nerve root block for possible occipital neuralgia on the left.  EMG of the left arm in 11/7/2017 did show some abnormal spontaneous activity in the left cervical paraspinal muscles.   EMG on 5/24/2021 with Dr. 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    - She was also evaluated with another neurosurgeon 2/22/201 through telemedicine (Dr. Baltazar) for Chirari 1 malformation.  It was noted that there was no crowding at the foarmen magnum and no sign of compression at cervical medullary junction.  No further MRI in sitting position was recommended.  Neurosurgical intervention was not recommended.  - She was followed with Dr. Galeano of general neurology, last seen 8/15/2019 (notes reviewed 10/4/2017).  There were issues such as myoclonic jerks, spine pain, \"tics\" of left lower face addressed, and b/l leg pain.  Left facial numbness which also spread into her forearm was mentioned, as well as sudden jerking 5-6 times per day.  EMG 4/5/2018 of lower extremities was normal.  3 hour EEG /4/2019 was normal (2 events were captured. One event occurred 2 minutes after hyperventilation and she reported body stiffening and was able to press the button.  One event was of left hand numbness).  - She was seen with Dr. Snowden for involuntary movements, which ultimately were thought to be exaggerated hypnic jerks.  Her facial movements were not " "necessarily consistent with hemifacial spasm, but was to complete an EMG.  Facial EMG 9/26/2019 was normal.  - She follows with Dr. Martinez of [facility] pain management.  She was last seen 3/17/2022 with this provider for ongoing multifocal pain.  Prior L4-S1 TLIF in 2013 was noted to be done given 25 years of back pain, but she developed radiating sensations in an S1 dermatome following the procedure.  She underwent removal of instrumentation, but her pain continued.  She was to continue with b/l S1 joint injection following her upcomming vacation.    Today, the patient wanted to check in with a neurologist about a few issues. She feels her hypnic jerks are happening more often, but not necessarily occurring with new symptoms.  Since the winter of 2019, she has had feelings of twitching and awakeness in her body but her head feels sleepy.  She doesn't have a restlessness in her legs which gets better with walking.  She has had chronic (years) of a feeling like her head \"sloshes\" when turning it side to side or when blinking rapidly or when looking from side-to side.  She describes left scapular bee stinging sensations, without rash or cramping, occurring intermittently and only for seconds.  She points to her inferior medial border of the scapula to show where her sensory issues are.  She felt like this sensation was like a bug bite, but there was no bite.  This has been on/off since last summer.      She has tried SUNSHINE in the past with Dr. Martinez, and often they are not helpful.  She has not had cervical injection for chronic radiculopathy. She uses metoclopramide for nausea, but the use is not daily.  She also uses duloxetine daily, as it is one of the few medications that is really helping her with multiple symptoms. She is working with a pharmacist to reduce medications given prior reports of feeling like she was sluggish, and having odd movements of her body.       Social History:   Smoker (2pack years, quit " 12/1/2013).  Occasional use of alcohol.  Medical marijuana use.      Medications:   Butrans patch every 7 days  Duloxetine 60mg QAM, 30 mg at bedtime  Metoclopramide  Tizanidine  Tramadol     Physical Exam:   Vitals: /84   Pulse 78   Resp 16   Wt 83 kg (183 lb)   LMP  (LMP Unknown)   SpO2 97%   BMI 28.66 kg/m     General: Seated comfortably in no acute distress.  HEENT: Neck supple with normal range of motion. No paracervical muscle tenderness or tightness.  Optic discs sharp and vasculature normal on funduscopic exam.   Skin: No rashes  Neurologic:     Mental Status: Fully alert, attentive and oriented.     Cranial Nerves: Visual fields intact to threat in all quadrants. EOMI with normal smooth pursuit (no nystagmus, no BOBBI). Facial movements symmetric. No cerebellar dysarthria.     Motor: No tremors or other abnormal movements observed, and no spasticity or rigidity noted with tone. No pronator drift. Normal/symmetric rapid finger tapping. Strength 5/5 throughout upper and lower extremities.     Deep Tendon Reflexes: 2+/symmetric throughout upper and and patellar, but achilles are relatively mute to 1+ b/l. No clonus.      Sensory: Intact/symmetric to light touch, pinprick, vibration (full 15-18 seconds at great toes and MM b/l) and proprioception throughout upper and lower extremities. Negative Romberg.      Coordination: Finger-nose-finger without dysmetria. Rapid alternating movements intact/symmetric with normal speed and rhythm.     Gait: Normal, steady casual gait. Able to walk on toes, heels and tandem (forward and backward) without difficulty.         Assessment and Plan:   Assessment:  Akithisia   Likely trigger points along trapezius, left    The patient describes an akithisia like sensation that is a bit more varied and diffuse that what is often described with periodic limb movement disorders.  I suspect that her history of low iron could play a role here, as she is on minimal  supplementation at the moment and at her last check her ferritin was low (a year ago).  I also wonder if her medications aren't playing a role, considering duloxetine can worsen side-effects from metoclopramide relating to EPS as it is a moderate CYP2D6 inhibitor, and together the medications can lead to increased risk for serotonin syndrome.  I asked she keep a diary of when she takes her metoclopramide and whether this relates to some of her inner sense of restlessness episodes.  Part of her akithisia episodes could be related to insomnia, given she tends to focus on the sensation and this causes a disruption in her ability to initiate sleep.  She may benefit from a sleep medicine referral and consideration of sleep psychology.    Regarding her sensory issues of the back, I don't necessarily thin this relates to chronic radicular findings by description or exam.  Instead, it may be musuloskeletal tightness of the trapezius, and subsequent trigger point symtoms.  If it continues, perhaps a trigger point block through physiatry could be considered.     Plan:  - Sleep medicine referral (tiredness of mentation, but restlessness of body). Consider sleep psychology.  - Monitor duloxetine and metoclopramide use along with akithisia  - Ferritin, Iron and iron binding, CBC    Follow up in Neurology clinic in 6 months, or should new concerns arise.      RIKY Domingo D.O.   of Neurology      Total time  today (90 min) in this patient encounter was spent on pre-charting, counseling and/or coordination of care. The patient is in agreement with this plan and has no further questions.

## 2022-06-24 NOTE — PROGRESS NOTES
[facility] Neurology Consultation    Ada Welch MRN# [id number]   Age: 51 year old YOB: 1970     Requesting physician: Ellyn Lyon     Reason for Consultation: numbness in legs      History of Presenting Symptoms:   Ada Welch is a 51 year old female who presents today for evaluation of numbness in legs.  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     The patient has an exteremly complex neurological medical history including evaluations from multiple providers in just the [facility].  Upon chart review today:  - Excellent summary of multiple issues was noted 3/10/2022 with Dr. Rivera (note reviewed today).  - The patient is well followed with Dr. Noemy Nguyen of [facility] neurology for migraine management, as well as prior evaluations for issues related to suspected CSF leak (reviewed notes dated 10/28/2019, 5/17/2022, and 5/31/2022).  She was still undergoing treatment for migraines with vision impairment and sleep deprived headaches. Treatment of migraines was to be done with botox, which occurred 5/31/2022.  Prior use of blood patches wasn't necessarily helpful for headache.  Issues of cervicalgia as well as arm numbness were reported to be worsening, and she was to have MRI brain and MRI cervical spine 7/9/2022 following XR cervical spine results on 5/31/202 showing persistant anterolisthesis of C3-4 and C4-5 along with degenerative changes at C5-6-7.  - She was seen with Dr. Russell and Dr. Isbell for ongoing management of POTS (10/2/2019 note reviewed today).    - She has been evaluated with Dr. Moncada of neurosurgery multiple times for left arm sensory issues and cervicalgia as well as headache concerns possibly stemming from CSF leak or Chiari. (11/30/217, 1/3/2020). It was noted that she doesn't need a  "C4-5-6 fusion in a second opinion comapred to NSGY opinion from a [facility] provider. It was also noted she did not respond at all to selective nerve root block for possible occipital neuralgia on the left.  EMG of the left arm in 11/7/2017 did show some abnormal spontaneous activity in the left cervical paraspinal muscles.   EMG on 5/24/2021 with Dr. 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    - She was also evaluated with another neurosurgeon 2/22/201 through telemedicine (Dr. Baltazar) for Chirari 1 malformation.  It was noted that there was no crowding at the foarmen magnum and no sign of compression at cervical medullary junction.  No further MRI in sitting position was recommended.  Neurosurgical intervention was not recommended.  - She was followed with Dr. Galeano of general neurology, last seen 8/15/2019 (notes reviewed 10/4/2017).  There were issues such as myoclonic jerks, spine pain, \"tics\" of left lower face addressed, and b/l leg pain.  Left facial numbness which also spread into her forearm was mentioned, as well as sudden jerking 5-6 times per day.  EMG 4/5/2018 of lower extremities was normal.  3 hour EEG /4/2019 was normal (2 events were captured. One event occurred 2 minutes after hyperventilation and she reported body stiffening and was able to press the button.  One event was of left hand numbness).  - She was seen with Dr. Snowden for involuntary movements, which ultimately were thought to be exaggerated hypnic jerks.  Her facial movements were not necessarily consistent with hemifacial spasm, but was to complete an EMG.  Facial EMG 9/26/2019 was normal.  - She follows with Dr. Martinez of [facility] pain management.  She was last seen 3/17/2022 with this provider for ongoing multifocal pain.  Prior L4-S1 TLIF in 2013 was noted to be done given 25 years of back pain, but " "she developed radiating sensations in an S1 dermatome following the procedure.  She underwent removal of instrumentation, but her pain continued.  She was to continue with b/l S1 joint injection following her upcomming vacation.    Today, the patient wanted to check in with a neurologist about a few issues. She feels her hypnic jerks are happening more often, but not necessarily occurring with new symptoms.  Since the winter of 2019, she has had feelings of twitching and awakeness in her body but her head feels sleepy.  She doesn't have a restlessness in her legs which gets better with walking.  She has had chronic (years) of a feeling like her head \"sloshes\" when turning it side to side or when blinking rapidly or when looking from side-to side.  She describes left scapular bee stinging sensations, without rash or cramping, occurring intermittently and only for seconds.  She points to her inferior medial border of the scapula to show where her sensory issues are.  She felt like this sensation was like a bug bite, but there was no bite.  This has been on/off since last summer.      She has tried SUNSHINE in the past with Dr. Martinez, and often they are not helpful.  She has not had cervical injection for chronic radiculopathy. She uses metoclopramide for nausea, but the use is not daily.  She also uses duloxetine daily, as it is one of the few medications that is really helping her with multiple symptoms. She is working with a pharmacist to reduce medications given prior reports of feeling like she was sluggish, and having odd movements of her body.       Social History:   Smoker (2pack years, quit 12/1/2013).  Occasional use of alcohol.  Medical marijuana use.      Medications:   Butrans patch every 7 days  Duloxetine 60mg QAM, 30 mg at bedtime  Metoclopramide  Tizanidine  Tramadol     Physical Exam:   Vitals: /84   Pulse 78   Resp 16   Wt 83 kg (183 lb)   LMP  (LMP Unknown)   SpO2 97%   BMI 28.66 kg/m   "   General: Seated comfortably in no acute distress.  HEENT: Neck supple with normal range of motion. No paracervical muscle tenderness or tightness.  Optic discs sharp and vasculature normal on funduscopic exam.   Skin: No rashes  Neurologic:     Mental Status: Fully alert, attentive and oriented.     Cranial Nerves: Visual fields intact to threat in all quadrants. EOMI with normal smooth pursuit (no nystagmus, no BOBBI). Facial movements symmetric. No cerebellar dysarthria.     Motor: No tremors or other abnormal movements observed, and no spasticity or rigidity noted with tone. No pronator drift. Normal/symmetric rapid finger tapping. Strength 5/5 throughout upper and lower extremities.     Deep Tendon Reflexes: 2+/symmetric throughout upper and and patellar, but achilles are relatively mute to 1+ b/l. No clonus.      Sensory: Intact/symmetric to light touch, pinprick, vibration (full 15-18 seconds at great toes and MM b/l) and proprioception throughout upper and lower extremities. Negative Romberg.      Coordination: Finger-nose-finger without dysmetria. Rapid alternating movements intact/symmetric with normal speed and rhythm.     Gait: Normal, steady casual gait. Able to walk on toes, heels and tandem (forward and backward) without difficulty.         Assessment and Plan:   Assessment:  Akithisia   Likely trigger points along trapezius, left    The patient describes an akithisia like sensation that is a bit more varied and diffuse that what is often described with periodic limb movement disorders.  I suspect that her history of low iron could play a role here, as she is on minimal supplementation at the moment and at her last check her ferritin was low (a year ago).  I also wonder if her medications aren't playing a role, considering duloxetine can worsen side-effects from metoclopramide relating to EPS as it is a moderate CYP2D6 inhibitor, and together the medications can lead to increased risk for serotonin  syndrome.  I asked she keep a diary of when she takes her metoclopramide and whether this relates to some of her inner sense of restlessness episodes.  Part of her akithisia episodes could be related to insomnia, given she tends to focus on the sensation and this causes a disruption in her ability to initiate sleep.  She may benefit from a sleep medicine referral and consideration of sleep psychology.    Regarding her sensory issues of the back, I don't necessarily thin this relates to chronic radicular findings by description or exam.  Instead, it may be musuloskeletal tightness of the trapezius, and subsequent trigger point symtoms.  If it continues, perhaps a trigger point block through physiatry could be considered.     Plan:  - Sleep medicine referral (tiredness of mentation, but restlessness of body). Consider sleep psychology.  - Monitor duloxetine and metoclopramide use along with akithisia  - Ferritin, Iron and iron binding, CBC    Follow up in Neurology clinic in 6 months, or should new concerns arise.    RIKY Domingo D.O.   of Neurology    Total time  today (90 min) in this patient encounter was spent on pre-charting, counseling and/or coordination of care. The patient is in agreement with this plan and has no further questions.

## 2022-06-24 NOTE — TELEPHONE ENCOUNTER
Prior Authorization Approval    Authorization Effective Date: 5/24/2022  Authorization Expiration Date: 6/22/2025  Medication: DULoxetine (CYMBALTA) 30 MG capsule  Approved Dose/Quantity: 270 per 90 days  Reference #:     Insurance Company: [employer] - Phone [phone] Fax [phone]  Expected CoPay:       Which Pharmacy is filling the prescription (Not needed for infusion/clinic administered): [facility] - [address], [address]  Pharmacy Notified: Yes  Patient Notified: No

## 2022-06-27 ENCOUNTER — TELEPHONE (OUTPATIENT)
Dept: NEUROLOGY | Facility: CLINIC | Age: 52
End: 2022-06-27

## 2022-06-28 ENCOUNTER — MYC MEDICAL ADVICE (OUTPATIENT)
Dept: INTERNAL MEDICINE | Facility: CLINIC | Age: 52
End: 2022-06-28

## 2022-07-09 ENCOUNTER — ANCILLARY PROCEDURE (OUTPATIENT)
Dept: MRI IMAGING | Facility: CLINIC | Age: 52
End: 2022-07-09
Attending: PSYCHIATRY & NEUROLOGY
Payer: COMMERCIAL

## 2022-07-09 DIAGNOSIS — M54.2 CERVICALGIA: ICD-10-CM

## 2022-07-09 DIAGNOSIS — R20.0 ARM NUMBNESS: ICD-10-CM

## 2022-07-09 PROCEDURE — 72141 MRI NECK SPINE W/O DYE: CPT | Mod: GC | Performed by: RADIOLOGY

## 2022-07-11 ENCOUNTER — TELEPHONE (OUTPATIENT)
Dept: GASTROENTEROLOGY | Facility: CLINIC | Age: 52
End: 2022-07-11

## 2022-07-11 NOTE — TELEPHONE ENCOUNTER
Talked with pt and she is aware her apt will be canceled. Told her we will call back when we can reschedule her

## 2022-07-12 ASSESSMENT — ANXIETY QUESTIONNAIRES
3. WORRYING TOO MUCH ABOUT DIFFERENT THINGS: NOT AT ALL
GAD7 TOTAL SCORE: 1
6. BECOMING EASILY ANNOYED OR IRRITABLE: NOT AT ALL
7. FEELING AFRAID AS IF SOMETHING AWFUL MIGHT HAPPEN: NOT AT ALL
5. BEING SO RESTLESS THAT IT IS HARD TO SIT STILL: NOT AT ALL
GAD7 TOTAL SCORE: 1
GAD7 TOTAL SCORE: 1
4. TROUBLE RELAXING: SEVERAL DAYS
8. IF YOU CHECKED OFF ANY PROBLEMS, HOW DIFFICULT HAVE THESE MADE IT FOR YOU TO DO YOUR WORK, TAKE CARE OF THINGS AT HOME, OR GET ALONG WITH OTHER PEOPLE?: NOT DIFFICULT AT ALL
7. FEELING AFRAID AS IF SOMETHING AWFUL MIGHT HAPPEN: NOT AT ALL
2. NOT BEING ABLE TO STOP OR CONTROL WORRYING: NOT AT ALL
1. FEELING NERVOUS, ANXIOUS, OR ON EDGE: NOT AT ALL

## 2022-07-12 ASSESSMENT — ENCOUNTER SYMPTOMS
HEADACHES: 1
POLYPHAGIA: 0
HYPOTENSION: 1
NECK PAIN: 1
MUSCLE WEAKNESS: 1
HOT FLASHES: 1
LEG PAIN: 1
TASTE DISTURBANCE: 0
HEMATURIA: 0
POOR WOUND HEALING: 1
HALLUCINATIONS: 0
EYE WATERING: 1
PARALYSIS: 0
WEAKNESS: 1
TROUBLE SWALLOWING: 1
FEVER: 0
ARTHRALGIAS: 1
LIGHT-HEADEDNESS: 1
MYALGIAS: 1
RECTAL PAIN: 0
MEMORY LOSS: 1
CONSTIPATION: 1
PALPITATIONS: 1
CHILLS: 1
DIZZINESS: 1
WEIGHT GAIN: 0
NECK MASS: 1
VOMITING: 0
POLYDIPSIA: 1
SWOLLEN GLANDS: 1
DIFFICULTY URINATING: 1
SINUS CONGESTION: 1
INCREASED ENERGY: 0
JAUNDICE: 0
WEIGHT LOSS: 0
STIFFNESS: 1
SMELL DISTURBANCE: 0
NAIL CHANGES: 1
EYE REDNESS: 1
ALTERED TEMPERATURE REGULATION: 1
BLOATING: 1
BLOOD IN STOOL: 0
BACK PAIN: 1
BOWEL INCONTINENCE: 0
SYNCOPE: 0
FLANK PAIN: 1
DECREASED APPETITE: 1
EYE IRRITATION: 1
MUSCLE CRAMPS: 1
LOSS OF CONSCIOUSNESS: 1
SINUS PAIN: 1
TREMORS: 0
DECREASED LIBIDO: 0
ABDOMINAL PAIN: 1
BRUISES/BLEEDS EASILY: 1
DOUBLE VISION: 1
NUMBNESS: 1
DISTURBANCES IN COORDINATION: 1
HYPERTENSION: 1
NAUSEA: 1
SPEECH CHANGE: 0
JOINT SWELLING: 1
HEARTBURN: 0
SLEEP DISTURBANCES DUE TO BREATHING: 0
DIARRHEA: 1
EYE PAIN: 0
HOARSE VOICE: 1
TINGLING: 1
ORTHOPNEA: 1
DYSURIA: 0
SKIN CHANGES: 0
FATIGUE: 1
NIGHT SWEATS: 1
SORE THROAT: 0
EXERCISE INTOLERANCE: 1
SEIZURES: 0

## 2022-07-13 ENCOUNTER — VIRTUAL VISIT (OUTPATIENT)
Dept: PHARMACY | Facility: CLINIC | Age: 52
End: 2022-07-13
Payer: COMMERCIAL

## 2022-07-13 DIAGNOSIS — R52 PAIN: ICD-10-CM

## 2022-07-13 DIAGNOSIS — Z13.79 GENETIC TESTING: Primary | ICD-10-CM

## 2022-07-13 DIAGNOSIS — G89.29 CHRONIC INTRACTABLE HEADACHE, UNSPECIFIED HEADACHE TYPE: ICD-10-CM

## 2022-07-13 DIAGNOSIS — R51.9 CHRONIC INTRACTABLE HEADACHE, UNSPECIFIED HEADACHE TYPE: ICD-10-CM

## 2022-07-13 PROCEDURE — 99605 MTMS BY PHARM NP 15 MIN: CPT | Performed by: PHARMACIST

## 2022-07-13 PROCEDURE — 99607 MTMS BY PHARM ADDL 15 MIN: CPT | Performed by: PHARMACIST

## 2022-07-13 NOTE — PROGRESS NOTES
Medication Therapy Management (MTM) Encounter    ASSESSMENT:                            Medication Adherence/Access: No issues identified    PGx Results: No changes need to be made based on her PGx results. Could consider folate supplementation due to MTHFR variant, but there is not much evidence to support this. She should call [facility] to confirm that they will send her a copy of the report.      Pain: Patient should continue on duloxetine due to pain efficacy.    Migraines: Patient should follow-up with neurology to potentially discuss other prevention options if she is interested in doing so.     PLAN:                            1. Call [facility] to see if they will mail you a copy of the genetic report.  2. Follow up with neurology if headaches continue.    Follow-up: Return if symptoms worsen or fail to improve.    SUBJECTIVE/OBJECTIVE:                          Ada Welch is a 51 year old female contacted via secure video for a follow-up visit.  Today's visit is a follow-up MTM visit from 10/15/21. Patient reports battling a migraine     Reason for visit: Pharmacogenomics report.    Allergies/ADRs: Reviewed in chart  Past Medical History: Reviewed in chart  Tobacco: She reports that she quit smoking about 8 years ago. Her smoking use included cigarettes. She started smoking about 31 years ago. She has a 2.00 pack-year smoking history. She has never used smokeless tobacco.      Medication Adherence/Access: no issues reported    PGx Results: We discussed her PGx results and for the most part she is a normal metabolizer and the variants she did have did not affect her current medications. They could have affected the morphine she had reactions to in the past due to COMT.     Pain: Was getting more nerve pain without duloxetine so she went back on it. Had the same issue with Corlanor and she was having issues with drowsiness, but if she doesn't take it her heart rate is all over the place.     Migraines: Having a  bad migraine right now. Reports she has tried the Immetrex and this has not been helpful today. Maxalt is also not helping. Has also used Tylenol and ibuprofen. Reports she hasn't had one like this in a long time. Not taking anything for migraine prevention other than Botox.     Today's Vitals: LMP  (LMP Unknown)   ----------------      I spent 29 minutes with this patient today. All changes were made via collaborative practice agreement with Ellyn Rivera MD. A copy of the visit note was provided to the patient's provider(s).    The patient was sent via [employer] a summary of these recommendations.     Kirk Burger, Pharm. D., BCACP  Medication Therapy Management Pharmacist  Direct Voicemail: [phone]      Telemedicine Visit Details  Type of service:  Telephone visit  Start Time: 11:30 am  End Time: 11:59 AM  Originating Location (patient location): [place]  Distant Location (provider location):  [facility]     Medication Therapy Recommendations  No medication therapy recommendations to display

## 2022-07-13 NOTE — Clinical Note
Pharmacogenetic test mostly unremarkable.  Kirk Burger Pharm. D., [facility] Medication Therapy Management Pharmacist Direct Voicemail: [phone]

## 2022-07-14 ENCOUNTER — OFFICE VISIT (OUTPATIENT)
Dept: ANESTHESIOLOGY | Facility: CLINIC | Age: 52
End: 2022-07-14
Payer: COMMERCIAL

## 2022-07-14 VITALS — DIASTOLIC BLOOD PRESSURE: 95 MMHG | OXYGEN SATURATION: 99 % | SYSTOLIC BLOOD PRESSURE: 131 MMHG | HEART RATE: 80 BPM

## 2022-07-14 DIAGNOSIS — G89.29 CHRONIC RIGHT SHOULDER PAIN: ICD-10-CM

## 2022-07-14 DIAGNOSIS — G89.29 CHRONIC SACROILIAC JOINT PAIN: ICD-10-CM

## 2022-07-14 DIAGNOSIS — M19.011 BILATERAL ACROMIOCLAVICULAR JOINT ARTHRITIS: ICD-10-CM

## 2022-07-14 DIAGNOSIS — M25.511 CHRONIC RIGHT SHOULDER PAIN: ICD-10-CM

## 2022-07-14 DIAGNOSIS — Q79.60 EHLERS-DANLOS SYNDROME: ICD-10-CM

## 2022-07-14 DIAGNOSIS — M51.369 DDD (DEGENERATIVE DISC DISEASE), LUMBAR: ICD-10-CM

## 2022-07-14 DIAGNOSIS — G43.719 INTRACTABLE CHRONIC MIGRAINE WITHOUT AURA AND WITHOUT STATUS MIGRAINOSUS: ICD-10-CM

## 2022-07-14 DIAGNOSIS — M19.012 BILATERAL ACROMIOCLAVICULAR JOINT ARTHRITIS: ICD-10-CM

## 2022-07-14 DIAGNOSIS — M47.812 CERVICAL FACET JOINT SYNDROME: ICD-10-CM

## 2022-07-14 DIAGNOSIS — G89.4 CHRONIC PAIN SYNDROME: ICD-10-CM

## 2022-07-14 DIAGNOSIS — M96.1 FAILED BACK SURGICAL SYNDROME: Primary | ICD-10-CM

## 2022-07-14 DIAGNOSIS — Z79.891 OPIOID CONTRACT EXISTS: ICD-10-CM

## 2022-07-14 DIAGNOSIS — E88.89: ICD-10-CM

## 2022-07-14 DIAGNOSIS — M53.3 CHRONIC SACROILIAC JOINT PAIN: ICD-10-CM

## 2022-07-14 PROCEDURE — 99214 OFFICE O/P EST MOD 30 MIN: CPT | Performed by: ANESTHESIOLOGY

## 2022-07-14 RX ORDER — HYDROMORPHONE HYDROCHLORIDE 2 MG/1
4 TABLET ORAL EVERY 6 HOURS PRN
Qty: 60 TABLET | Refills: 0 | Status: SHIPPED | OUTPATIENT
Start: 2022-07-14 | End: 2022-07-21

## 2022-07-14 RX ORDER — METHOCARBAMOL 500 MG/1
500 TABLET, FILM COATED ORAL 4 TIMES DAILY PRN
Qty: 120 TABLET | Refills: 1 | Status: SHIPPED | OUTPATIENT
Start: 2022-07-14 | End: 2022-09-22 | Stop reason: ALTCHOICE

## 2022-07-14 ASSESSMENT — PAIN SCALES - GENERAL: PAINLEVEL: EXTREME PAIN (8)

## 2022-07-14 NOTE — PROGRESS NOTES
[facility] : Progress Note    Date of visit: 7/19/2022    Interval history:    Ada Welch is a 51 year old female who  is well known to me for multifocal chronic pain. The patient has  complex history of low back pain for ~25 years for which she underwent L4-S1 fusion (TLIF) 12/9/13.  Postoperatively, her back pain worsened (mainly LBP, but also radiated down both lower limbs in an S1 dermatomal distribution).   Underwent removal of posterior instrumentation (screws and guadalupe).  After fusion removal, her pain continued.  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  She even sought care from a EDS specialist in [place].  She is currently on disability.     She has a h/o significant autonomic dysfunction.  Since 2014, she reports that her heart rate has fluctuated from as low as  beats per minutes and her blood pressure also fluctuates at the same time without any aggravating factors.  The fluctuation of the blood pressure and heart rate happens when she is supine, sitting, standing, walking, etc.  She has seen cardiologist Dr. Russell who advised her to continue fludrocortisone, isometric exercise, low carbohydrate and low gluten diet.     She has been seeing Dr. Singh for urinary urgency and incontinence.  She is advised for improving lifestyle, dietary modification, optimizing bowel regimen and to start pelvic physical therapy.     She has seen Dr. Moncada for left upper extremity radicular pain along the C8 nerve root distribution.  Per Dr. Moncada's note, she does not have a surgical target which may explain her symptoms.  She still continues to report this symptom.  We discussed about epidural steroid injection, however she is reluctant to perform the procedures and states she previously had a bad reaction with a steroid.   Patient also had cervical medial branch nerve block x2 with us 50% pain relief.   However medial branch  nerve radiofrequency ablation was not approved by the insurance.     The patient has seen Dr. Mack for eosinophilic esophagitis, GERD, and dysphagia.    She had open hernia surgery by Dr. Mack in 2020.  Her postoperative course was uneventful.        The patient has been seeing Dr. Noemy Nguyen for chronic migraines. Patient reports that her chronic intractable headache pain recently got worse. 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  She has undergone multiple blood patches intermittently for her headache pain.       Currently she has been  managing her pain with stable dose of Butrans 10 mcg per hour q 7 days, tramadol 50 mg every 6 hours as needed which she refills every 2-3 months.  In addition she takes tizanidine 4 mg 4 times daily as needed.    She takes sumatriptan for breakthrough migraine.  Occasionally she takes oxycodone  when her pain gets significantly worse.      Today her main complaints are followin.  She had MRI of the brain ordered by Dr. Nguyen.  She is laying flat on the MRI machine for nearly an hour.  She states that after MRI she developed another pain flare.  Currently her pain starts in the lower back and shoots up to the spine.  Pain also shoots down to both buttock and upper mid thigh.  She denies electric shocklike radicular pain to the lower extremity.  2.  She reports bilateral SI joint pain.  In the past she had SI joint injection with a mild to moderate pain relief.  3.  Currently managing pain with Butrans patch 10 mics per hour.  She states that her pain is not adequately controlled with prn tramadol and oxycodone.  Previously she took hydromorphone.  She requests a one-time refill of hydromorphone.    [place] Prescription Monitoring Program:   Reviewed. No concerns     Review of Systems:  The 14 system ROS was reviewed and was negative except what is documented above and as follows.  Any bowel or bladder problems: none  Mood:  pal    Physical Exam:  Vitals:    07/14/22 0908   BP: (!) 131/95   BP Location: Right arm   Patient Position: Chair   Cuff Size: Adult Large   Pulse: 80   SpO2: 99%       General: Awake in no apparent distress.   Eyes: Sclerae are anicteric. PERRLA, EOMI   Neck: supple, no masses.   Lungs: unlabored.   Heart: regular rate and rhythm   Abdomen: soft non tender.  Extremities: Pulses are well palpable, no peripheral edema.   Musculoskeletal: 5/5 muscle strength in all extremities.  Tenderness to palpation noted in the bilateral sacroiliac joints.  Tenderness to palpation noted on that her lumbar paraspinal muscles.  Facet loading is positive.  Bilateral leg raise is limited to less than 60 degrees due to pain.  Magdaleno positive bilaterally  Neurologic exam:Sensation intact throughout all dermatomes bilateral upper extremities and lower extremities  Psychiatric; Normal affect.   Skin: Warm and Dry.    Medications:  Current Outpatient Medications   Medication Sig Dispense Refill     acetaminophen (TYLENOL) 500 MG tablet Take 1,000 mg by mouth every 6 hours as needed for mild pain       buprenorphine (BUTRANS) 10 MCG/HR WK patch Place 1 patch onto the skin every 7 days 4 patch 1     cetirizine (ZYRTEC) 10 MG tablet Take 10 mg by mouth daily as needed        co-enzyme Q-10 100 MG CAPS capsule Take 100 mg by mouth daily       DULoxetine (CYMBALTA) 30 MG capsule Take 60mg in the morning and 30mg at bedtime 270 capsule 1     EPINEPHrine (EPIPEN/ADRENACLICK/OR ANY BX GENERIC EQUIV) 0.3 MG/0.3ML injection 2-pack INJECT 0.3 MLS (0.3 MG) INTO THE MUSCLE AS NEEDED FOR ANAPHYLAXIS  1     ferrous fumarate 65 mg, [place]. FE,-Vitamin C 125 mg (VITRON-C)  MG TABS tablet Take 1 tablet by mouth every other day 60 tablet 4     fluticasone (FLONASE) 50 MCG/ACT nasal spray Spray 2 sprays into both nostrils daily Call clinic to schedule follow up appointment. 48 mL 11     HYDROmorphone (DILAUDID) 2 MG tablet Take 2 tablets (4 mg) by mouth  every 6 hours as needed for pain 60 tablet 0     ivabradine (CORLANOR) 7.5 MG tablet Take 1 tab (7.5mg)  In AM and half tab (3.75mg) in afternoon 135 tablet 3     Lidocaine (LIDOCARE) 4 % Patch Place 1-3 patches onto the skin every 24 hours To prevent lidocaine toxicity, patient should be patch free for 12 hrs daily. 20 patch 0     medical cannabis (Patient's own supply.  Not a prescription) Take 1 Dose by mouth See Admin Instructions (This is NOT a prescription, and does not certify that the patient has a qualifying medical condition for medical cannabis.  The purpose of this order is  to document that the patient reports taking medical cannabis.)     Capsule formulation - uses as needed (currently out of this and not using as of January 28, 2020)       methocarbamol (ROBAXIN) 500 MG tablet Take 1 tablet (500 mg) by mouth 4 times daily as needed for muscle spasms 120 tablet 1     metoclopramide (REGLAN) 10 MG tablet Take 1 tablet (10 mg) by mouth 4 times daily as needed (headache) 40 tablet 3     Multiple Vitamins-Minerals (MULTIVITAMIN PO) Take 1 tablet by mouth daily        naloxone (NARCAN) 4 MG/0.1ML nasal spray Spray 1 spray (4 mg) into one nostril alternating nostrils once as needed for opioid reversal every 2-3 minutes until assistance arrives (Patient not taking: Reported on 7/19/2022) 0.2 mL 0     naproxen sodium (ANAPROX) 220 MG tablet Take 440-660 mg by mouth daily as needed for moderate pain       Probiotic Product (PROBIOTIC DAILY PO) Take 2 packets by mouth every morning        Rhubarb (ESTROVEN COMPLETE) 4 MG TABS Take 1 tablet by mouth       rizatriptan (MAXALT-MLT) 5 MG ODT TAKE 1-2 TABLETS (5-10 MG) BY MOUTH AT ONSET OF HEADACHE FOR MIGRAINE (MAX 30 MG IN 24 HOURS) 18 tablet 11     SUMAtriptan (IMITREX STATDOSE) 6 MG/0.5ML pen injector kit Inject 0.5 ml (6 mg) subcutaneously at onset of migraine, May repeat in 1 hour , Max 12 mg/24 hrs 2 kit 11     tiZANidine (ZANAFLEX) 4 MG tablet Take 1 tablet  (4 mg) by mouth 4 times daily as needed for muscle spasms 360 tablet 3     traMADol (ULTRAM) 50 MG tablet Take 1 tablet (50 mg) by mouth every 6 hours as needed for severe pain TAKE 1 TABLET BY MOUTH EVERY 6 HOURS AS NEEDED FOR SEVERE PAIN 60 tablet 1     vitamin B complex with vitamin C (STRESS TAB) tablet Take 1 tablet by mouth daily       VITAMIN D, CHOLECALCIFEROL, PO Take 2,000 Units by mouth daily       linaclotide (LINZESS) 145 MCG capsule Take 1 capsule (145 mcg) by mouth every morning (before breakfast) 30 capsule 11       Analgesic Medications:   Medications related to Pain Management (From now, onward)    Start     Dose/Rate Route Frequency Ordered Stop    07/14/22 0000  HYDROmorphone (DILAUDID) 2 MG tablet         4 mg Oral EVERY 6 HOURS PRN 07/14/22 1015 07/28/22 2359    07/14/22 0000  methocarbamol (ROBAXIN) 500 MG tablet         500 mg Oral 4 TIMES DAILY PRN 07/14/22 1016               LABORATORY VALUES:   Recent Labs   Lab Test 11/13/20  1133 02/12/20  0611    139   POTASSIUM 4.5 3.6   CHLORIDE 102 106   CO2 29 27   ANIONGAP 4 6   GLC 96 106*   BUN 10 6*   CR 0.83 0.62   JUAN 8.6 8.0*       CBC RESULTS:   Recent Labs   Lab Test 06/24/22  1122   WBC 6.5   RBC 4.74   HGB 14.8   HCT 43.5   MCV 92   MCH 31.2   MCHC 34.0   RDW 12.1          Most Recent 3 INR's:  Recent Labs   Lab Test 01/30/20  1325 02/28/19  1045 12/17/18  0920   INR 0.93 0.97 0.90           ASSESSMENT:       Diagnoses       Codes Comments    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  4 patches per month    Ordered hydromorphone 4 mg every 6 hours as needed.  60 tablets dispensed.  This is an one-time prescription only for pain flare.  She does not take this on a regular basis.    Duloxetine 60 mg every morning and 30 mg nightly.  Prescribed by other provider    Recommend over-the-counter lidocaine 4% patches for the most painful spots on lower back every 12 hours    Continue using medical cannabis    Start methocarbamol 500 mg 4 times daily as needed.  120 tablets dispensed    Sumatriptan/rizatriptan for migraine headache as needed.  Prescribe ER provider    She had multiple injections in our clinic with mild to moderate relief.  Discussed about following up with Dr. Enciso for possible surgical intervention including extension of the prior fusion surgery.     Follow-up 6 months    Assessment will be ongoing with changes in treatment as indicated.  Benefits/risks/alternatives to treatment have been reviewed and the patient has been instructed to contact this office if they have any questions or concerns.  This plan of care has been discussed with the patient and the patient is in agreement.     Faiza Martinez MD, PHD               Answers for HPI/ROS submitted by the patient on 7/12/2022  SAMANTHA 7 TOTAL SCORE: 1  General Symptoms: Yes  Skin Symptoms: Yes  HENT Symptoms: Yes  EYE SYMPTOMS: Yes  HEART SYMPTOMS: Yes  LUNG SYMPTOMS: No  INTESTINAL SYMPTOMS: Yes  URINARY SYMPTOMS: Yes  GYNECOLOGIC SYMPTOMS: Yes  BREAST SYMPTOMS: No  SKELETAL SYMPTOMS: Yes  BLOOD SYMPTOMS: Yes  NERVOUS SYSTEM SYMPTOMS: Yes  MENTAL HEALTH SYMPTOMS: No  Ear pain: No  Ear discharge: No  Hearing loss: No  Tinnitus: Yes  Nosebleeds: No  Congestion: Yes  Sinus pain: Yes  Trouble swallowing: Yes   Voice hoarseness: Yes  Mouth sores: No  Sore throat: No  Tooth pain: Yes  Gum tenderness:  No  Bleeding gums: No  Change in taste: No  Change in sense of smell: No  Dry mouth: Yes  Hearing aid used: No  Neck lump: Yes  Fever: No  Loss of appetite: Yes  Weight loss: No  Weight gain: No  Fatigue: Yes  Night sweats: Yes  Chills: Yes  Increased stress: No  Excessive hunger: No  Excessive thirst: Yes  Feeling hot or cold when others believe the temperature is normal: Yes  Loss of height: No  Post-operative complications: Yes  Surgical site pain: Yes  Hallucinations: No  Change in or Loss of Energy: No  Hyperactivity: No  Confusion: No  Changes in hair: No  Changes in moles/birth marks: No  Itching: No  Rashes: No  Changes in nails: Yes  Acne: No  Hair in places you don't want it: No  Change in facial hair: No  Warts: No  Non-healing sores: Yes  Scarring: Yes  Flaking of skin: No  Color changes of hands/feet in cold : Yes  Sun sensitivity: Yes  Skin thickening: Yes  Eye pain: No  Vision loss: Yes  Dry eyes: Yes  Watery eyes: Yes  Eye bulging: No  Double vision: Yes  Flashing of lights: Yes  Spots: Yes  Floaters: Yes  Redness: Yes  Crossed eyes: No  Tunnel Vision: No  Yellowing of eyes: No  Eye irritation: Yes  Chest pain or pressure: No  Fast or irregular heartbeat: Yes  Pain in legs with walking: Yes  Trouble breathing while lying down: Yes  Fingers or toes appear blue: Yes  High blood pressure: Yes  Low blood pressure: Yes  Fainting: No  Murmurs: No  Pacemaker: No  Varicose veins: No  Wake up at night with shortness of breath: No  Light-headedness: Yes  Exercise intolerance: Yes  Heart burn or indigestion: No  Nausea: Yes  Vomiting: No  Abdominal pain: Yes  Bloating: Yes  Constipation: Yes  Diarrhea: Yes  Blood in stool: No  Black stools: No  Rectal or Anal pain: No  Fecal incontinence: No  Yellowing of skin or eyes: No  Vomit with blood: No  Change in stools: Yes  Trouble holding urine or incontinence: Yes  Pain or burning: No  Trouble starting or stopping: No  Increased frequency of urination: No  Blood in  urine: No  Decreased frequency of urination: No  Frequent nighttime urination: Yes  Flank pain: Yes  Difficulty emptying bladder: Yes  Bleeding or spotting between periods: No  Heavy or painful periods: No  Irregular periods: No  Vaginal discharge: No  Hot flashes: Yes  Vaginal dryness: No  Genital ulcers: No  Reduced libido: No  Painful intercourse: No  Difficulty with sexual arousal: No  Post-menopausal bleeding: No  Back pain: Yes  Muscle aches: Yes  Neck pain: Yes  Swollen joints: Yes  Joint pain: Yes  Bone pain: Yes  Muscle cramps: Yes  Muscle weakness: Yes  Joint stiffness: Yes  Bone fracture: No  Edema or swelling: Yes  Anemia: Yes  Swollen glands: Yes  Easy bleeding or bruising: Yes  Trouble with coordination: Yes  Dizziness or trouble with balance: Yes  Fainting or black-out spells: Yes  Memory loss: Yes  Headache: Yes  Seizures: No  Speech problems: No  Tingling: Yes  Tremor: No  Weakness: Yes  Difficulty walking: Yes  Paralysis: No  Numbness: Yes

## 2022-07-14 NOTE — LETTER
7/14/2022     RE: Ada Welch  [address] [facility] [address]     Dear Colleague,    Thank you for referring your patient, Ada Welch, to the [facility] COM[facility]NAG[facility] [place] at [facility]. Please see a copy of my visit note below.    [facility] Comprehensive Chronic Pain Management : Progress Note    Date of visit: 7/19/2022    Interval history:    Ada Welch is a 51 year old female who  is well known to me for multifocal chronic pain. The patient has  complex history of low back pain for ~25 years for which she underwent L4-S1 fusion (TLIF) 12/9/13.  Postoperatively, her back pain worsened (mainly LBP, but also radiated down both lower limbs in an S1 dermatomal distribution).   Underwent removal of posterior instrumentation (screws and guadalupe).  After fusion removal, her pain continued.  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  She even sought care from a EDS specialist in [place].  She is currently on disability.     She has a h/o significant autonomic dysfunction.  Since 2014, she reports that her heart rate has fluctuated from as low as  beats per minutes and her blood pressure also fluctuates at the same time without any aggravating factors.  The fluctuation of the blood pressure and heart rate happens when she is supine, sitting, standing, walking, etc.  She has seen cardiologist Dr. Russell who advised her to continue fludrocortisone, isometric exercise, low carbohydrate and low gluten diet.     She has been seeing Dr. Singh for urinary urgency and incontinence.  She is advised for improving lifestyle, dietary modification, optimizing bowel regimen and to start pelvic physical therapy.     She has seen Dr. Moncada for left upper extremity radicular pain along the C8 nerve root distribution.  Per Dr. Moncada's note, she does not have a surgical target which may  explain her symptoms.  She still continues to report this symptom.  We discussed about epidural steroid injection, however she is reluctant to perform the procedures and states she previously had a bad reaction with a steroid.   Patient also had cervical medial branch nerve block x2 with us 50% pain relief.   However medial branch nerve radiofrequency ablation was not approved by the insurance.     The patient has seen Dr. Mack for eosinophilic esophagitis, GERD, and dysphagia.    She had open hernia surgery by Dr. Mack in 2020.  Her postoperative course was uneventful.        The patient has been seeing Dr. Noemy Nguyen for chronic migraines. Patient reports that her chronic intractable headache pain recently got worse. 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  She has undergone multiple blood patches intermittently for her headache pain.       Currently she has been  managing her pain with stable dose of Butrans 10 mcg per hour q 7 days, tramadol 50 mg every 6 hours as needed which she refills every 2-3 months.  In addition she takes tizanidine 4 mg 4 times daily as needed.    She takes sumatriptan for breakthrough migraine.  Occasionally she takes oxycodone  when her pain gets significantly worse.      Today her main complaints are followin.  She had MRI of the brain ordered by Dr. Nguyen.  She is laying flat on the MRI machine for nearly an hour.  She states that after MRI she developed another pain flare.  Currently her pain starts in the lower back and shoots up to the spine.  Pain also shoots down to both buttock and upper mid thigh.  She denies electric shocklike radicular pain to the lower extremity.  2.  She reports bilateral SI joint pain.  In the past she had SI joint injection with a mild to moderate pain relief.  3.  Currently managing pain with Butrans patch 10 mics per hour.  She states that her pain is not adequately controlled with prn tramadol and oxycodone.   Previously she took hydromorphone.  She requests a one-time refill of hydromorphone.    [place] Prescription Monitoring Program:   Reviewed. No concerns     Review of Systems:  The 14 system ROS was reviewed and was negative except what is documented above and as follows.  Any bowel or bladder problems: none  Mood: okay    Physical Exam:  Vitals:    07/14/22 0908   BP: (!) 131/95   BP Location: Right arm   Patient Position: Chair   Cuff Size: Adult Large   Pulse: 80   SpO2: 99%       General: Awake in no apparent distress.   Eyes: Sclerae are anicteric. PERRLA, EOMI   Neck: supple, no masses.   Lungs: unlabored.   Heart: regular rate and rhythm   Abdomen: soft non tender.  Extremities: Pulses are well palpable, no peripheral edema.   Musculoskeletal: 5/5 muscle strength in all extremities.  Tenderness to palpation noted in the bilateral sacroiliac joints.  Tenderness to palpation noted on that her lumbar paraspinal muscles.  Facet loading is positive.  Bilateral leg raise is limited to less than 60 degrees due to pain.  Magdaleno positive bilaterally  Neurologic exam:Sensation intact throughout all dermatomes bilateral upper extremities and lower extremities  Psychiatric; Normal affect.   Skin: Warm and Dry.    Medications:  Current Outpatient Medications   Medication Sig Dispense Refill     acetaminophen (TYLENOL) 500 MG tablet Take 1,000 mg by mouth every 6 hours as needed for mild pain       buprenorphine (BUTRANS) 10 MCG/HR WK patch Place 1 patch onto the skin every 7 days 4 patch 1     cetirizine (ZYRTEC) 10 MG tablet Take 10 mg by mouth daily as needed        co-enzyme Q-10 100 MG CAPS capsule Take 100 mg by mouth daily       DULoxetine (CYMBALTA) 30 MG capsule Take 60mg in the morning and 30mg at bedtime 270 capsule 1     EPINEPHrine (EPIPEN/ADRENACLICK/OR ANY BX GENERIC EQUIV) 0.3 MG/0.3ML injection 2-pack INJECT 0.3 MLS (0.3 MG) INTO THE MUSCLE AS NEEDED FOR ANAPHYLAXIS  1     ferrous fumarate 65 mg, [place].  FE,-Vitamin C 125 mg (VITRON-C)  MG TABS tablet Take 1 tablet by mouth every other day 60 tablet 4     fluticasone (FLONASE) 50 MCG/ACT nasal spray Spray 2 sprays into both nostrils daily Call clinic to schedule follow up appointment. 48 mL 11     HYDROmorphone (DILAUDID) 2 MG tablet Take 2 tablets (4 mg) by mouth every 6 hours as needed for pain 60 tablet 0     ivabradine (CORLANOR) 7.5 MG tablet Take 1 tab (7.5mg)  In AM and half tab (3.75mg) in afternoon 135 tablet 3     Lidocaine (LIDOCARE) 4 % Patch Place 1-3 patches onto the skin every 24 hours To prevent lidocaine toxicity, patient should be patch free for 12 hrs daily. 20 patch 0     medical cannabis (Patient's own supply.  Not a prescription) Take 1 Dose by mouth See Admin Instructions (This is NOT a prescription, and does not certify that the patient has a qualifying medical condition for medical cannabis.  The purpose of this order is  to document that the patient reports taking medical cannabis.)     Capsule formulation - uses as needed (currently out of this and not using as of January 28, 2020)       methocarbamol (ROBAXIN) 500 MG tablet Take 1 tablet (500 mg) by mouth 4 times daily as needed for muscle spasms 120 tablet 1     metoclopramide (REGLAN) 10 MG tablet Take 1 tablet (10 mg) by mouth 4 times daily as needed (headache) 40 tablet 3     Multiple Vitamins-Minerals (MULTIVITAMIN PO) Take 1 tablet by mouth daily        naloxone (NARCAN) 4 MG/0.1ML nasal spray Spray 1 spray (4 mg) into one nostril alternating nostrils once as needed for opioid reversal every 2-3 minutes until assistance arrives (Patient not taking: Reported on 7/19/2022) 0.2 mL 0     naproxen sodium (ANAPROX) 220 MG tablet Take 440-660 mg by mouth daily as needed for moderate pain       Probiotic Product (PROBIOTIC DAILY PO) Take 2 packets by mouth every morning        Rhubarb (ESTROVEN COMPLETE) 4 MG TABS Take 1 tablet by mouth       rizatriptan (MAXALT-MLT) 5 MG ODT TAKE 1-2  TABLETS (5-10 MG) BY MOUTH AT ONSET OF HEADACHE FOR MIGRAINE (MAX 30 MG IN 24 HOURS) 18 tablet 11     SUMAtriptan (IMITREX STATDOSE) 6 MG/0.5ML pen injector kit Inject 0.5 ml (6 mg) subcutaneously at onset of migraine, May repeat in 1 hour , Max 12 mg/24 hrs 2 kit 11     tiZANidine (ZANAFLEX) 4 MG tablet Take 1 tablet (4 mg) by mouth 4 times daily as needed for muscle spasms 360 tablet 3     traMADol (ULTRAM) 50 MG tablet Take 1 tablet (50 mg) by mouth every 6 hours as needed for severe pain TAKE 1 TABLET BY MOUTH EVERY 6 HOURS AS NEEDED FOR SEVERE PAIN 60 tablet 1     vitamin B complex with vitamin C (STRESS TAB) tablet Take 1 tablet by mouth daily       VITAMIN D, CHOLECALCIFEROL, PO Take 2,000 Units by mouth daily       linaclotide (LINZESS) 145 MCG capsule Take 1 capsule (145 mcg) by mouth every morning (before breakfast) 30 capsule 11       Analgesic Medications:   Medications related to Pain Management (From now, onward)    Start     Dose/Rate Route Frequency Ordered Stop    07/14/22 0000  HYDROmorphone (DILAUDID) 2 MG tablet         4 mg Oral EVERY 6 HOURS PRN 07/14/22 1015 07/28/22 2359    07/14/22 0000  methocarbamol (ROBAXIN) 500 MG tablet         500 mg Oral 4 TIMES DAILY PRN 07/14/22 1016               LABORATORY VALUES:   Recent Labs   Lab Test 11/13/20  1133 02/12/20  0611    139   POTASSIUM 4.5 3.6   CHLORIDE 102 106   CO2 29 27   ANIONGAP 4 6   GLC 96 106*   BUN 10 6*   CR 0.83 0.62   JUAN 8.6 8.0*       CBC RESULTS:   Recent Labs   Lab Test 06/24/22  1122   WBC 6.5   RBC 4.74   HGB 14.8   HCT 43.5   MCV 92   MCH 31.2   MCHC 34.0   RDW 12.1          Most Recent 3 INR's:  Recent Labs   Lab Test 01/30/20  1325 02/28/19  1045 12/17/18  0920   INR 0.93 0.97 0.90           ASSESSMENT:       Diagnoses       Codes Comments    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  4 patches per month    Ordered hydromorphone 4 mg every 6 hours as needed.  60 tablets dispensed.  This is an one-time prescription only for pain flare.  She does not take this on a regular basis.    Duloxetine 60 mg every morning and 30 mg nightly.  Prescribed by other provider    Recommend over-the-counter lidocaine 4% patches for the most painful spots on lower back every 12 hours    Continue using medical cannabis    Start methocarbamol 500 mg 4 times daily as needed.  120 tablets dispensed    Sumatriptan/rizatriptan for migraine headache as needed.  Prescribe ER provider    She had multiple injections in our clinic with mild to moderate relief.  Discussed about following up with Dr. Enciso for possible surgical intervention including extension of the prior fusion surgery.     Follow-up 6 months    Assessment will be ongoing with changes in treatment as indicated.  Benefits/risks/alternatives to treatment have been reviewed and the patient has been instructed to contact this office if they have any questions or concerns.  This plan of care has been discussed with the patient and the patient is in agreement.     Faiza Martinez MD, PHD               Answers for HPI/ROS submitted by the patient on 7/12/2022  SAMANTHA 7 TOTAL SCORE: 1  General Symptoms: Yes  Skin Symptoms: Yes  HENT Symptoms: Yes  EYE SYMPTOMS: Yes  HEART SYMPTOMS: Yes  LUNG SYMPTOMS: No  INTESTINAL SYMPTOMS: Yes  URINARY SYMPTOMS: Yes  GYNECOLOGIC SYMPTOMS: Yes  BREAST SYMPTOMS: No  SKELETAL SYMPTOMS:  Yes  BLOOD SYMPTOMS: Yes  NERVOUS SYSTEM SYMPTOMS: Yes  MENTAL HEALTH SYMPTOMS: No  Ear pain: No  Ear discharge: No  Hearing loss: No  Tinnitus: Yes  Nosebleeds: No  Congestion: Yes  Sinus pain: Yes  Trouble swallowing: Yes   Voice hoarseness: Yes  Mouth sores: No  Sore throat: No  Tooth pain: Yes  Gum tenderness: No  Bleeding gums: No  Change in taste: No  Change in sense of smell: No  Dry mouth: Yes  Hearing aid used: No  Neck lump: Yes  Fever: No  Loss of appetite: Yes  Weight loss: No  Weight gain: No  Fatigue: Yes  Night sweats: Yes  Chills: Yes  Increased stress: No  Excessive hunger: No  Excessive thirst: Yes  Feeling hot or cold when others believe the temperature is normal: Yes  Loss of height: No  Post-operative complications: Yes  Surgical site pain: Yes  Hallucinations: No  Change in or Loss of Energy: No  Hyperactivity: No  Confusion: No  Changes in hair: No  Changes in moles/birth marks: No  Itching: No  Rashes: No  Changes in nails: Yes  Acne: No  Hair in places you don't want it: No  Change in facial hair: No  Warts: No  Non-healing sores: Yes  Scarring: Yes  Flaking of skin: No  Color changes of hands/feet in cold : Yes  Sun sensitivity: Yes  Skin thickening: Yes  Eye pain: No  Vision loss: Yes  Dry eyes: Yes  Watery eyes: Yes  Eye bulging: No  Double vision: Yes  Flashing of lights: Yes  Spots: Yes  Floaters: Yes  Redness: Yes  Crossed eyes: No  Tunnel Vision: No  Yellowing of eyes: No  Eye irritation: Yes  Chest pain or pressure: No  Fast or irregular heartbeat: Yes  Pain in legs with walking: Yes  Trouble breathing while lying down: Yes  Fingers or toes appear blue: Yes  High blood pressure: Yes  Low blood pressure: Yes  Fainting: No  Murmurs: No  Pacemaker: No  Varicose veins: No  Wake up at night with shortness of breath: No  Light-headedness: Yes  Exercise intolerance: Yes  Heart burn or indigestion: No  Nausea: Yes  Vomiting: No  Abdominal pain: Yes  Bloating: Yes  Constipation:  Yes  Diarrhea: Yes  Blood in stool: No  Black stools: No  Rectal or Anal pain: No  Fecal incontinence: No  Yellowing of skin or eyes: No  Vomit with blood: No  Change in stools: Yes  Trouble holding urine or incontinence: Yes  Pain or burning: No  Trouble starting or stopping: No  Increased frequency of urination: No  Blood in urine: No  Decreased frequency of urination: No  Frequent nighttime urination: Yes  Flank pain: Yes  Difficulty emptying bladder: Yes  Bleeding or spotting between periods: No  Heavy or painful periods: No  Irregular periods: No  Vaginal discharge: No  Hot flashes: Yes  Vaginal dryness: No  Genital ulcers: No  Reduced libido: No  Painful intercourse: No  Difficulty with sexual arousal: No  Post-menopausal bleeding: No  Back pain: Yes  Muscle aches: Yes  Neck pain: Yes  Swollen joints: Yes  Joint pain: Yes  Bone pain: Yes  Muscle cramps: Yes  Muscle weakness: Yes  Joint stiffness: Yes  Bone fracture: No  Edema or swelling: Yes  Anemia: Yes  Swollen glands: Yes  Easy bleeding or bruising: Yes  Trouble with coordination: Yes  Dizziness or trouble with balance: Yes  Fainting or black-out spells: Yes  Memory loss: Yes  Headache: Yes  Seizures: No  Speech problems: No  Tingling: Yes  Tremor: No  Weakness: Yes  Difficulty walking: Yes  Paralysis: No  Numbness: Yes    Again, thank you for allowing me to participate in the care of your patient.      Sincerely,    Faiza Martinez MD

## 2022-07-14 NOTE — PATIENT INSTRUCTIONS
"Recommendations from today's MTM visit:                                                         1. Call [facility] to see if they will mail you a copy of the genetic report.  2. Follow up with neurology if headaches continue.    Follow-up: No follow-ups on file.    It was great speaking with you today.  I value your experience and would be very thankful for your time in providing feedback in our clinic survey. In the next few days, you may receive an email or text message from [facility] with a link to a survey related to your  clinical pharmacist.\"     To schedule another MTM appointment, please call the clinic directly or you may call the MTM scheduling line at [phone] or toll-free at [phone].     My Clinical Pharmacist's contact information:                                                      Please feel free to contact me with any questions or concerns you have.      iKrk Burger, Pharm. D., [facility]  Medication Therapy Management Pharmacist  Direct Voicemail: [phone]     "

## 2022-07-14 NOTE — PATIENT INSTRUCTIONS
Medications:        Hydromorphine 4 mg every 6 hours,              methocarbamol (ROBAXIN) 500 MG tablet             *For refills of opioid medications - You MUST call (Or MyChart) the clinic DIRECTLY and at least 7 days before you run out of your medication.    *Please provide the clinic with a minium of 1 week notice, on all prescription     RRecommended Follow up:    Every 3 Months           Please call [phone], option #1 to schedule your clinic appointment if you don't already have an appointment scheduled.        To speak with a nurse, schedule/reschedule/cancel a clinic appointment, or request a medication refill call: [phone], option #1.    You can also reach us by [employer]: [email or web address]

## 2022-07-14 NOTE — NURSING NOTE
Patient presents with:  Follow Up: Follow up RM 8 Neck and Lower Back 9/10      Extreme Pain (8)     Pain Medications     Analgesics Other Refills Start End     acetaminophen (TYLENOL) 500 MG tablet          Sig - Route: Take 1,000 mg by mouth every 6 hours as needed for mild pain - Oral    Class: Historical    Opioid Agonists Refills Start End     traMADol (ULTRAM) 50 MG tablet    1 6/14/2022     Sig - Route: Take 1 tablet (50 mg) by mouth every 6 hours as needed for severe pain TAKE 1 TABLET BY MOUTH EVERY 6 HOURS AS NEEDED FOR SEVERE PAIN - Oral    Class: E-Prescribe    No prior authorization was found for this prescription.    Found prior authorization for another prescription for the same medication: Closed - Other    Opioid Partial Agonists Refills Start End     buprenorphine (BUTRANS) 10 MCG/HR WK patch    1 6/14/2022     Sig - Route: Place 1 patch onto the skin every 7 days - Transdermal    Class: E-Prescribe    No prior authorization was found for this prescription.    Found prior authorization for another prescription for the same medication: Approved          What medications are you using for pain? Bupreonrhine ,Alevel,Tlyenol    (New patients only) Have you been seen by another pain clinic/ provider? no    (Return Patients only) What refills are you needing today? no

## 2022-07-15 ENCOUNTER — MYC MEDICAL ADVICE (OUTPATIENT)
Dept: ANESTHESIOLOGY | Facility: CLINIC | Age: 52
End: 2022-07-15

## 2022-07-17 NOTE — PROGRESS NOTES
Video start: 7:02  Video end: 7:44  Type of video: [facility]    GI CLINIC VISIT    CC/REFERRING MD:  Referred Self  REASON FOR FOLLOW UP: Constipation, Nausea  Discussed Plan outlined below with Dr. Juana Ruvalcaba    ASSESSMENT/PLAN:  Ms. 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 Of note, she was previously seen for dysphagia and esophageal eosinophilia by Dr. Roland, who thought that the findings may be reflux-related in the setting of a hiatal hernia. EGD that was repeated after she completed an 8 week trial of high dose PPI showed resolution of eosinophils.  She is now s/p nissen fundoplication 2/2020 with small hiatal hernia and mild esophageal dysmotility per [facility] 4/2022 .  No further hematochezia (previously attributed to hemorrhoids).    # Nausea  # Bloating  # s/p Nissen fundoplication, 2/2020  She reports constant nausea for many years. She states that it is occasionally associated with eating, but cannot pinpoint associated factors. May be associated with underlying dysautonomia. She is taking Reglan ~4x/wk which she says helps - tries to limit use given risks and dyskinesia she has experienced. Has not yet tried ginger, but would like to try this. Could consider antiemetic in future if worsens however this could be constipating. She reports bloating frequently, especially since Nissen. She associates bloating with constipation, but also experiences when bowels are moving. She does drink carbonated sodas frequently and a number of gassy foods.  -- lifestyle/dietary changes to reduce gas  -- RD referral to assist with dietary triggers and recommendations for nausea/bloating s/p nissen  -- Try ginger for nausea  -- Try simethicone as needed for gas    # Constipation  # Rectal intussusception   # Pelvic floor  "dysfunction  # Tom-Danlos syndrome  Constipation has been fairly chronic for patient, although has overall improved from previous assessments which she attributes to improvement in diet - focusing on whole foods, fruits, vegetables rather than processed foods. At baseline she is currently having a BM most days that is mushy/liqud but still feels full. Straining and using positional changes for most bowel movements. Feels \"full\" and bloated most days. Potentially some degree of overflow v mixed picture. Constipation is multifactorial with known pelvic floor dysfunction and rectal intussusception and likely exacerbated by pain medication and possibly dysautonomia. She has not followed up with pelvic floor center yet as surgery was recommended and she is worried about this with her Tom-Danlos syndrome. Discussed that reassessment would be helpful they could fully discuss all options. No BM x 1 week today after increase in pain meds with current pain flare. No severe abdominal pain, but mild diffuse discomfort and feeling full and bloated. She would like to try Senna today as this has worked for her in the past.  -- Follow up with [facility], referral sent today.  -- Continue good hydration  -- Start Miralax to achieve BM with current constipation and continue for maintenance therapy 1 cap daily (can increase or decrease as needed pending stool consistency).  -- Ok to use Senna for rescue when without a BM for several days, but should not be used on a daily basis. Plan to  today and use given no BM x 1 week. If no BM in the next 1-2 days or more abdominal discomfort, try enema.  -- Restart Linzess 145 mg per day as this has worked well in the past, could increase to 290 mg per day if needed.  -- Consider referral to colorectal surgery after pelvic floor physical therapy pending recommendations     # Abdominal Cramping  # Diarrhea  Pt reports that she has episodes of diffuse abdominal pain followed by urgent " diarrhea associated with her dysautonomic syndrome. This was previously happening frequently but now 2-3x/month. Potentially related to eating out. Future considerations include enteric coated peppermint oil, could also consider hyocyamine however this could be constipating.  -- No intervention today as making a number of other changes and overall improving.     # Colon cancer screening   Given EDS, colonoscopies may be too high risk. Will consider colon cancer screening via stool tests. Last colonoscopy 2017 with small hyperplastic sigmoid polyp with 10 year follow-up recommended.    ---colon cancer screening via stool tests      RTC 3-4 months    Thank you for this consultation.  It was a pleasure to participate in the care of this patient; please contact us with any further questions.     60 Minutes was spent on the date of the encounter during chart review, history and exam, documentation, and further activities as noted     Janeth Miller PA-C, RD  Division of Hepatology, Gastroenterology & Nutrition  [facility]      HPI  Ms. 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  Patient last seen 4/2021 with Dr. Griffith.      At the time of initial consultation 11/2017, she was in the process of work up for mastocytosis (urine showed elevated histamine levels, but patient was on antacid therapy and Mobic at that time). PMHx also includes 5 vaginal deliveries, migraines, cervicalgia, chronic pain syndrome, degenerative joint disease, chronic pan sinusitis.  She had been referred to the GI clinic for evaluation of possible IBS with mixed stools.      At consultation:  She has had lifelong symptoms of abdominal distension, bloating and borborygmi shortly after eating.  Stools alternates between loose stools and constipation. Usually, she has constipation in the form of hard stools and  incomplete evacuation. Performs splinting (applies posterior vaginal wall pressure with  "fingers) to help evacuate stool. Had \"pelvic floor releases\" with PT in [place], [place] and \"[facility]\" in [place], [place] with some help. Period of constipation will last for 1 month; during this time, will have 1 BM weekly with Miralax. Endorses lower abdominal pain that worsens with constipation.  Previously failed Miralax, senna, dulcolax, fiber, suppositories.  Period of constipation is then followed by several days of loose stools. Ms. Welch endorses occasional BRB with straining in the toilet and TP x 20 years. Cscope 3/2017 showed small mouthed diverticulosis in the sigmoid and 1 hyperplastic polyp; repeat cscope in 10 years was the recommendation. Uses buprenorphine patch (opioid) x 3 years. She saw [facility] with recommendation for biofeedback therapy. Rectal intussusception was noted and will likely need surgical repair in the future. Linzess had been very helpful with constipation and bloating and allowed for her to have a daily BM, however she did not return to the clinic to have it refilled and discontinued it.      She had been taking 20 mg prilosec during the day and pepcid at night with adequate heartburn control at consultation.     In 2018 she developed dysphagia.  This was assessed with EGD that was concerning for EoE grossly. She had been on prilosec 20mg + Pepcid prior to the scope. Had not olga on twice daily PPI prior to scope. Per Dr. Roland's assessment in 2017, the thought was that the eosinophilia (previously seen on prior endoscopy) may be 2/2 reflux in the setting of a hiatal hernia. She continued to have dysphagia to primarily meat, liquids were ok.  She had manometry that was abnormal with weak lower esophageal pressure, hiatal hernia, possible short esophagus though stature of patient needs to be considered. peristalisis preserved. She then had hernia repair, nissen and PEG 2/10/20.     Over all symptoms went well, did not need to use PEG tube and was removed. Still has some issues with solids " (mostly meats but can be breads, and need to drink quite a bit in order to help prevent getting stuck), liquids are ok unless she drinks too fast. No vomiting. No heartburn. She was taking omeprazole 40 mg BID and pepcid at night.       Every time she eats she experiences abdominal discomfort with cramping that last for hours.  Constantly nauseated. She is not on a bowel regimen (previously had been on linzess with good success).  Over the last couple weeks she has had a BM daily to every other day, however before this time she would go several days without a BM then have several stools in a row.  No blood in the stool.  She started an antiinflammatory diet which for her consists primarily of fruits, vegetables and meats.     She continues to use cannabis nightly for sleep. She takes Reglan BID (prescribed by neurologist and PCP, we discussed possible side effects today and danger of long term use, although none present now), takes naproxen once a week and continues to use buprenorphine pain pain weekly.      Interval history, 12/2020  Fundoplication went well. Feels that heartburn has improved significantly.  Last saw [facility] over a year ago. Was seen in 5/2018 with recommendations for biofeedback therapy and likely surgical correction of rectal intussusception. Ada did not undergo biofeedback therapy.  She had a follow up visit at the [facility] who recommended surgery but this was not scheduled due to prioritization of the nissen and due to other health issues, and then COVID.   In the meantime, restarted Linzess 145 mcg daily in Summer 2020, but this was not helpful.      Currently off Linzess. Alternates between regular BMs x 2 days, then diarrhea.      Had a cscope in 2017 with a small sigmoid polyp, that was hyperplastic.      Interval history, 4/2021  Continues on Reglan to avoid nausea, especially worse difficult to digest foods, like meat. Avoids steak, chicken, pork chops. Not seeing a nutritionist. Lost some  "weight semi-intentionally. Following a keto diet.      Having at least 1 BM per day. Fresh veggies help with regularity.   No longer on Linzess about a month ago; painless bright red blood in stool that occurred on Linzess and stopped with medication cessation. Not on OTC laxatives.   Has not followed up with [facility] yet.     Interval history 7/18/2022  Pt reports that she is currently in pain flare with significant back and leg pain and migranes. She has been able to manage symptoms from home with help of her neurology and pain team. Currently receiving Hydormophone for pain flare, typically uses Butrans patch and tramadol AM and PM for pain control. With added pain medication, she has not had a BM x 1 week. Taking probiotic and hydrating well but has not tried any laxatives yet. She reports that senna has worked well for her in the past when she had been several days without BM. She reports mushy stools prior to this week, but reports feeling \"full\" and requires positional changes and straining to have BM at baseline. She will sometimes go several days without BM before returning to this baseline. She reports back hurts more and diffuse abdominal discomfort and bloating when not having BM daily.    She has not followed up with [facility] because early appointment times and worried about surgery with her Tom Danlos Syndrome. Interested in returing and discussing all options with them including biofeedback.    Nauseous still every day - nothing specific aggravating sx. Takes Reglan 4-5 days (1-2 x). She feels Reglan helps but is trying to limit use with signficant side effects and recent dyskinesia.    She continues to take medical cannabis for sleep and pain - daily. Nausea for years \"life time baptiste\"  No melena, hematochezia.    Reports she has flares of dysautonomia which cause abdominal pain, cramping, and immediate diarrhea - used to happen frequently - now 2-3x/mo.    Eating carefully - cut out simple " trey, focusing on whole foods, removed processed pantry foods.Hydrating well. She reports that change in her diet has really helped her constipation and abdominal pain.    ROS:    No fevers or chills  No weight loss  No blurry vision, double vision or change in vision  No sore throat  No lymphadenopathy  No headache, paraesthesias, or weakness in a limb  No shortness of breath or wheezing  No chest pain or pressure  No arthralgias or myalgias  No rashes or skin changes  No odynophagia or dysphagia  No BRBPR, hematochezia, melena  No dysuria, frequency or urgency  No hot/cold intolerance or polyria  No anxiety or depression      PROBLEM LIST  Patient Active Problem List    Diagnosis Date Noted     Chronic sacroiliac joint pain 08/06/2021     Priority: Medium     Added automatically from request for surgery [id number]       Chronic right shoulder pain 02/17/2021     Priority: Medium     Arthropathy of cervical facet joint 01/25/2021     Priority: Medium     Added automatically from request for surgery [id number]       ERRONEOUS ENCOUNTER--DISREGARD 12/22/2020     Priority: Medium     Cervical pain 09/29/2020     Priority: Medium     Chronic pain of both shoulders 09/29/2020     Priority: Medium     Cervical facet joint syndrome 09/29/2020     Priority: Medium     Added automatically from request for surgery [id number]       Facet arthropathy, cervical 09/02/2020     Priority: Medium     Added automatically from request for surgery [id number]       Hiatal hernia 02/10/2020     Priority: Medium     s/p hiatal hernia repair, nissen, PEG, 2/10 01/22/2020     Priority: Medium     Added automatically from request for surgery [id number]       POTS (postural orthostatic tachycardia syndrome) 10/23/2019     Priority: Medium     Autonomic dysfunction 06/07/2018     Priority: Medium     Low back pain of multiple sites of spine with sciatica 12/09/2017     Priority: Medium     Intractable chronic migraine without aura and without status  migrainosus 12/09/2017     Priority: Medium     Obese 12/09/2017     Priority: Medium     Urinary urgency 08/30/2017     Priority: Medium     Nocturia 08/30/2017     Priority: Medium     Cervicalgia 08/25/2017     Priority: Medium     Headache 07/25/2017     Priority: Medium     Postlaminectomy syndrome, lumbar region 06/14/2017     Priority: Medium     Lymphadenopathy of left cervical region 03/27/2017     Priority: Medium     Easy bruising 10/13/2016     Priority: Medium     Head and neck lymphadenopathy 10/13/2016     Priority: Medium     Hepatomegaly 10/13/2016     Priority: Medium     Liver lesion 10/13/2016     Priority: Medium     Chronic left shoulder pain 09/05/2016     Priority: Medium     s/p TLIF L4-5,L5-S1 with solid arthrodesis 09/05/2016     Priority: Medium     Tom-Danlos syndrome 09/05/2016     Priority: Medium     Acquired hypothyroidism 07/22/2016     Priority: Medium     Anxiety 07/22/2016     Priority: Medium     Opioid dependence (H) 07/22/2016     Priority: Medium     Overview:   Treatment Agreement       Essential hypertension with goal blood pressure less than 140/90 07/22/2016     Priority: Medium     Low back pain 07/12/2016     Priority: Medium     Lumbar radiculopathy 02/18/2016     Priority: Medium     Encounter for genetic counseling and testing 01/05/2016     Priority: Medium     Overview:     [facility] Aortopathy Comprehensive Panel ordered 1/5/2016.       Sinus tachycardia 09/29/2015     Priority: Medium     Astigmatism 12/12/2014     Priority: Medium     Keratoconjunctivitis sicca (H) 12/12/2014     Priority: Medium     Presbyopia 12/12/2014     Priority: Medium     Opioid type dependence, continuous (H) 10/09/2014     Priority: Medium     Other acute postoperative pain 10/09/2014     Priority: Medium     Anxiety disorder 08/21/2014     Priority: Medium     Pain disorder associated with psychological and physical factors 08/21/2014     Priority: Medium     Low back pain radiating  to both legs 07/08/2014     Priority: Medium     Arthritis of facet joints at multiple vertebral levels 01/01/2014     Priority: Medium     Spondylolisthesis of lumbar region 12/10/2013     Priority: Medium     Lumbar degenerative disc disease 11/21/2013     Priority: Medium     DNS (deviated nasal septum) 10/09/2012     Priority: Medium     Nasal turbinate hypertrophy 10/09/2012     Priority: Medium     Dysuria 07/12/2011     Priority: Medium     Chronic sinusitis with recurrent bronchitis 02/01/2007     Priority: Medium     Allergic rhinitis 01/09/2007     Priority: Medium     Cyst of fallopian tube 12/28/1996     Priority: Medium     Arthritis involving multiple sites 01/01/1986     Priority: Medium     Uterine endometriosis 01/01/1986     Priority: Medium       PERTINENT PAST MEDICAL HISTORY:  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     Cervicalgia      Chronic sinusitis 00/2007    Diagnosed with chronic pansinusitis 2016     Coagulation disorder (H)     factor 8     CSF leak     Chiari malformation     Depression      Tom-Danlos disease      Eosinophilic esophagitis 08/2018     Gastroesophageal reflux disease 3/1/2017    I have large hiatal hernia     Hiatal hernia 2016    Have adeline hiatel hernia     Hoarseness Unsure    It comes and goes     Hypertension      Hypothyroid      IBS (irritable bowel syndrome) with constipation     improved on Linzess     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     Irregular heart beat      Migraines 1/1/2007    Unsure of exact date     Nasal polyps 2013    Were revoved 03/2017, benign     Orthostatic hypotension      Other nervous system  complications 1/2014    Autonomic nervous system disorder, neuralgia, neuropathy     Swelling, mass, or lump in head and neck 08/2016    Lymph node has been growing for last year     Thyroid disease 01/01/2008     Urinary incontinence      Urinary urgency        PREVIOUS SURGERIES:  Past Surgical History:   Procedure Laterality Date     AS REPAIR OF NASAL SEPTUM  2009    repair broke nose     BACK SURGERY  12/09/2013  10/01/2014    Spinal fusion L4-S1, L5 moving 5/8ths in. Remove screws/rods     BIOPSY  01/01/2008 & 3/2017    mole biopsy, lymph node biopsy     COLONOSCOPY  3/2017    Colonoscopy and GI     ESOPHAGEAL IMPEDENCE FUNCTION TEST WITH 24 HOUR PH GREATER THAN 1 HOUR N/A 9/17/2019    Procedure: IMPEDANCE PH STUDY, ESOPHAGUS, 24 HOUR;  Surgeon: Raghavendra Grande MD;  Location: [facility]     ESOPHAGOSCOPY, GASTROSCOPY, DUODENOSCOPY (EGD), COMBINED N/A 8/3/2018    Procedure: COMBINED ESOPHAGOSCOPY, GASTROSCOPY, DUODENOSCOPY (EGD), BIOPSY SINGLE OR MULTIPLE;;  Surgeon: Juan Woodson MD;  Location: [facility]     ESOPHAGOSCOPY, GASTROSCOPY, DUODENOSCOPY (EGD), COMBINED N/A 10/22/2018    Procedure: COMBINED ESOPHAGOSCOPY, GASTROSCOPY, DUODENOSCOPY (EGD), BIOPSY SINGLE OR MULTIPLE;  Surgeon: Sadia Carolina MD;  Location: [facility]     ESOPHAGOSCOPY, GASTROSCOPY, DUODENOSCOPY (EGD), COMBINED N/A 12/17/2018    Procedure: COMBINED ESOPHAGOSCOPY, GASTROSCOPY, DUODENOSCOPY (EGD);  Surgeon: Juan Woodson MD;  Location: [facility]     INJECT BLOCK MEDIAL BRANCH CERVICAL/THORACIC/LUMBAR Left 9/24/2020    Procedure: BLOCK, NERVE, FACET JOINT, MEDIAL BRANCH, DIAGNOSTIC;  Surgeon: Faiza Martinez MD;  [facility]     INJECT BLOCK MEDIAL BRANCH CERVICAL/THORACIC/LUMBAR Bilateral 10/8/2020    Procedure: Bilateral cervical 3, cervical 4, and cervical 5 medial branch nerve block;  Surgeon: Faiza Martinez MD;  Location: [facility]     INJECT BLOCK MEDIAL BRANCH CERVICAL/THORACIC/LUMBAR Right 3/4/2021    Procedure: Cervical 3, cervical  4, cervical 5 medial branch nerve blocks to target C3-C4 and C4-C5 facet joint;  Surgeon: Faiza Martinez MD;  Location: [facility]     LAPAROSCOPIC HERNIORRHAPHY HIATAL N/A 2/10/2020    Procedure: LAPAROSCOPIC HIATAL HERNIA REPAIR, NISSEN FUNDOPLICATION, ESOPHOGASTRODUODENOSCOPY, PEG TUBE PLACEMENT.;  Surgeon: Alana Mack MD;  Location: [facility]     NASAL/SINUS POLYPECTOMY  2017    Polyps were benign     NOSE SURGERY  2007    deviated septum     TONSILLECTOMY & ADENOIDECTOMY  1981     TUBAL LIGATION  07/01/2011       PREVIOUS ENDOSCOPY:  Colonoscopy   EGD 8/2017: findings s/f EoE, 3 cm HH. Path showed 40 eos/HPF in distal and mid esophagus. Mild acute inflammation and occasional eos.   EGD 3/2017: EGD with ringed esophagus with distal bx showing increased eos, min eos in mid esophagus.   cscope 3/2017: small-mouthed, mild diverticulosis, 1 small hyperplastic polyp. Recommended to repeat cscope in 10 years (given path).    OTHER PERTINENT STUDIES:  4/21/22 [facility]:  Impression:   1. Changes of hernia repair and fundoplication with tiny hiatal  herniation of the gastric wrap. The GE junction is positioned  normally. No gastroesophageal reflux identified.  2. Mild esophageal dysmotility.  3. Incidentally noted small duodenal diverticulum off the first  Portion.    GES 10/2017: rapid gastric emptying    Video swallow: mod HH (mixed type), deep penetration without aspiration.     ALLERGIES:     Allergies   Allergen Reactions     Baclofen Other (See Comments)     Loss of muscle tone. Muscle weakness.     Bee Venom Shortness Of Breath, Palpitations, Anaphylaxis and Difficulty breathing     Patient does carry Epi-Pen     Chicken-Derived Products (Egg) Nausea and Diarrhea     Compazine [Prochlorperazine]      Extreme jittery     Food      Milk     Gabapentin      Dizzy     Gluten Meal GI Disturbance     Wheat bran and wheat     Pregabalin      Seasonal Allergies      Dust, mold     Topiramate      Pt does not remember  reaction       PERTINENT MEDICATIONS:    Current Outpatient Medications:      acetaminophen (TYLENOL) 500 MG tablet, Take 1,000 mg by mouth every 6 hours as needed for mild pain, Disp: , Rfl:      buprenorphine (BUTRANS) 10 MCG/HR WK patch, Place 1 patch onto the skin every 7 days, Disp: 4 patch, Rfl: 1     cetirizine (ZYRTEC) 10 MG tablet, Take 10 mg by mouth daily as needed , Disp: , Rfl:      co-enzyme Q-10 100 MG CAPS capsule, Take 100 mg by mouth daily, Disp: , Rfl:      DULoxetine (CYMBALTA) 30 MG capsule, Take 60mg in the morning and 30mg at bedtime, Disp: 270 capsule, Rfl: 1     EPINEPHrine (EPIPEN/ADRENACLICK/OR ANY BX GENERIC EQUIV) 0.3 MG/0.3ML injection 2-pack, INJECT 0.3 MLS (0.3 MG) INTO THE MUSCLE AS NEEDED FOR ANAPHYLAXIS, Disp: , Rfl: 1     ferrous fumarate 65 mg, [place]. FE,-Vitamin C 125 mg (VITRON-C)  MG TABS tablet, Take 1 tablet by mouth every other day, Disp: 60 tablet, Rfl: 4     fluticasone (FLONASE) 50 MCG/ACT nasal spray, Spray 2 sprays into both nostrils daily Call clinic to schedule follow up appointment., Disp: 48 mL, Rfl: 11     HYDROmorphone (DILAUDID) 2 MG tablet, Take 2 tablets (4 mg) by mouth every 6 hours as needed for pain, Disp: 60 tablet, Rfl: 0     ivabradine (CORLANOR) 7.5 MG tablet, Take 1 tab (7.5mg)  In AM and half tab (3.75mg) in afternoon, Disp: 135 tablet, Rfl: 3     Lidocaine (LIDOCARE) 4 % Patch, Place 1-3 patches onto the skin every 24 hours To prevent lidocaine toxicity, patient should be patch free for 12 hrs daily., Disp: 20 patch, Rfl: 0     medical cannabis (Patient's own supply.  Not a prescription), Take 1 Dose by mouth See Admin Instructions (This is NOT a prescription, and does not certify that the patient has a qualifying medical condition for medical cannabis.  The purpose of this order is  to document that the patient reports taking medical cannabis.)   Capsule formulation - uses as needed (currently out of this and not using as of January 28, 2020),  Disp: , Rfl:      methocarbamol (ROBAXIN) 500 MG tablet, Take 1 tablet (500 mg) by mouth 4 times daily as needed for muscle spasms, Disp: 120 tablet, Rfl: 1     metoclopramide (REGLAN) 10 MG tablet, Take 1 tablet (10 mg) by mouth 4 times daily as needed (headache), Disp: 40 tablet, Rfl: 3     Multiple Vitamins-Minerals (MULTIVITAMIN PO), Take 1 tablet by mouth daily , Disp: , Rfl:      naloxone (NARCAN) 4 MG/0.1ML nasal spray, Spray 1 spray (4 mg) into one nostril alternating nostrils once as needed for opioid reversal every 2-3 minutes until assistance arrives, Disp: 0.2 mL, Rfl: 0     naproxen sodium (ANAPROX) 220 MG tablet, Take 440-660 mg by mouth daily as needed for moderate pain, Disp: , Rfl:      Probiotic Product (PROBIOTIC DAILY PO), Take 2 packets by mouth every morning , Disp: , Rfl:      Rhubarb (ESTROVEN COMPLETE) 4 MG TABS, Take 1 tablet by mouth, Disp: , Rfl:      rizatriptan (MAXALT-MLT) 5 MG ODT, TAKE 1-2 TABLETS (5-10 MG) BY MOUTH AT ONSET OF HEADACHE FOR MIGRAINE (MAX 30 MG IN 24 HOURS), Disp: 18 tablet, Rfl: 11     SUMAtriptan (IMITREX STATDOSE) 6 MG/0.5ML pen injector kit, Inject 0.5 ml (6 mg) subcutaneously at onset of migraine, May repeat in 1 hour , Max 12 mg/24 hrs, Disp: 2 kit, Rfl: 11     tiZANidine (ZANAFLEX) 4 MG tablet, Take 1 tablet (4 mg) by mouth 4 times daily as needed for muscle spasms, Disp: 360 tablet, Rfl: 3     traMADol (ULTRAM) 50 MG tablet, Take 1 tablet (50 mg) by mouth every 6 hours as needed for severe pain TAKE 1 TABLET BY MOUTH EVERY 6 HOURS AS NEEDED FOR SEVERE PAIN, Disp: 60 tablet, Rfl: 1     vitamin B complex with vitamin C (STRESS TAB) tablet, Take 1 tablet by mouth daily, Disp: , Rfl:      VITAMIN D, CHOLECALCIFEROL, PO, Take 2,000 Units by mouth daily, Disp: , Rfl:     Current Facility-Administered Medications:      Botulinum Toxin Type A (BOTOX) 200 units injection 155 Units, 155 Units, Intramuscular, See Admin Instructions, Noemy Nguyen MD, 155  Units at 22 1259   No other NSAID/anticoagulation reported by patient.  Taking OTC Probiotics    SOCIAL HISTORY:  Medical cannabis nightly for sleep and pain  Social History     Socioeconomic History     Marital status:      Spouse name: Carry     Number of children: 5     Years of education: Not on file     Highest education level: Not on file   Occupational History     Not on file   Tobacco Use     Smoking status: Former Smoker     Packs/day: 0.20     Years: 10.00     Pack years: 2.00     Types: Cigarettes     Start date: 1991     Quit date: 2013     Years since quittin.6     Smokeless tobacco: Never Used   Substance and Sexual Activity     Alcohol use: Yes     Comment: once a month     Drug use: Yes     Types: Marijuana     Comment: medical marijuana     Sexual activity: Yes     Partners: Male     Birth control/protection: Female Surgical   Other Topics Concern     Parent/sibling w/ CABG, MI or angioplasty before 65F 55M? No   Social History Narrative    5 kids: 26 son, 25 daughter, 21 daughter, 15 Leslie, 9 Ibeth yo     works at [employer] for disability        5 yo and 6 months grandchildren Marcelo and Shelly     Social Determinants of Health     Financial Resource Strain: Not on file   Food Insecurity: Not on file   Transportation Needs: Not on file   Physical Activity: Not on file   Stress: Not on file   Social Connections: Not on file   Intimate Partner Violence: Not on file   Housing Stability: Not on file       FAMILY HISTORY:  No colon/panc/esophageal/other GI CA. No other Faustin or other HPS-related Alex. No IBD or celiac disease. No other Autoimmune, Liver, or Thyroid disease.  Family History   Problem Relation Age of Onset     Connective Tissue Disorder Mother         eds     Connective Tissue Disorder Sister         eds     Cancer Father         Spinal tumor grew into spinal cord, went in brain     Migraines Father      Diabetes Maternal Grandmother          Elderly diabetes     Heart Disease Maternal Grandmother      Hypertension Maternal Grandmother      Diabetes Paternal Grandmother         Elderly diabetes     Diabetes Paternal Grandfather         Elderly diabetes     Asthma Sister         Pediatric Asthma     Migraines Sister      Asthma Son         Pediatric Asthma     Thyroid Disease Sister         ,     Migraines Sister      Migraines Sister      Breast Cancer No family hx of        Past/family/social history reviewed and no changes      PHYSICAL EXAMINATION:  Constitutional: AAOx3, cooperative, pleasant, not dyspneic/diaphoretic, no acute distress  Vitals reviewed: LMP  (LMP Unknown)   Wt:   Wt Readings from Last 2 Encounters:   06/24/22 83 kg (183 lb)   03/10/22 80.9 kg (178 lb 6.4 oz)     General appearance: Healthy appearing adult, in no acute distress  Eyes: Sclera anicteric, Pupils round and reactive to light  Ears, nose, mouth and throat: No obvious external lesions of ears and nose, Hearing intact  Neck: Symmetric, No obvious external lesions  Respiratory: Normal respiration, no use of accessory muscles   MSK: Gait normal  Skin: No rashes or jaundice   Psychiatric: Oriented to person, place and time, Appropriate mood and affect.       PERTINENT STUDIES:  Lab on 06/24/2022   Component Date Value Ref Range Status     Ferritin 06/24/2022 21  8 - 252 ng/mL Final     Iron 06/24/2022 116  35 - 180 ug/dL Final     Iron Binding Capacity 06/24/2022 413  240 - 430 ug/dL Final     Iron Sat Index 06/24/2022 28  15 - 46 % Final     WBC Count 06/24/2022 6.5  4.0 - 11.0 10e3/uL Final     RBC Count 06/24/2022 4.74  3.80 - 5.20 10e6/uL Final     Hemoglobin 06/24/2022 14.8  11.7 - 15.7 g/dL Final     Hematocrit 06/24/2022 43.5  35.0 - 47.0 % Final     MCV 06/24/2022 92  78 - 100 fL Final     MCH 06/24/2022 31.2  26.5 - 33.0 pg Final     MCHC 06/24/2022 34.0  31.5 - 36.5 g/dL Final     RDW 06/24/2022 12.1  10.0 - 15.0 % Final     Platelet Count 06/24/2022 238  150 - 450  10e3/uL Final     % Neutrophils 06/24/2022 68  % Final     % Lymphocytes 06/24/2022 23  % Final     % Monocytes 06/24/2022 6  % Final     % Eosinophils 06/24/2022 2  % Final     % Basophils 06/24/2022 1  % Final     % Immature Granulocytes 06/24/2022 0  % Final     NRBCs per 100 WBC 06/24/2022 0  <1 /100 Final     Absolute Neutrophils 06/24/2022 4.4  1.6 - 8.3 10e3/uL Final     Absolute Lymphocytes 06/24/2022 1.5  0.8 - 5.3 10e3/uL Final     Absolute Monocytes 06/24/2022 0.4  0.0 - 1.3 10e3/uL Final     Absolute Eosinophils 06/24/2022 0.2  0.0 - 0.7 10e3/uL Final     Absolute Basophils 06/24/2022 0.1  0.0 - 0.2 10e3/uL Final     Absolute Immature Granulocytes 06/24/2022 0.0  <=0.4 10e3/uL Final     Absolute NRBCs 06/24/2022 0.0  10e3/uL Final

## 2022-07-18 ENCOUNTER — VIRTUAL VISIT (OUTPATIENT)
Dept: GASTROENTEROLOGY | Facility: CLINIC | Age: 52
End: 2022-07-18
Payer: COMMERCIAL

## 2022-07-18 ENCOUNTER — MYC MEDICAL ADVICE (OUTPATIENT)
Dept: GASTROENTEROLOGY | Facility: CLINIC | Age: 52
End: 2022-07-18

## 2022-07-18 DIAGNOSIS — K59.02 CONSTIPATION DUE TO OUTLET DYSFUNCTION: ICD-10-CM

## 2022-07-18 DIAGNOSIS — R14.0 BLOATING: ICD-10-CM

## 2022-07-18 DIAGNOSIS — Z98.890 HISTORY OF NISSEN FUNDOPLICATION: ICD-10-CM

## 2022-07-18 DIAGNOSIS — R11.0 NAUSEA: Primary | ICD-10-CM

## 2022-07-18 PROCEDURE — 99215 OFFICE O/P EST HI 40 MIN: CPT | Mod: 95 | Performed by: DIETITIAN, REGISTERED

## 2022-07-18 NOTE — LETTER
7/18/2022     RE: Ada Welch  [address] [address]    Dear Colleague,    Thank you for referring your patient, Ada Welch, to the [facility] [place]. Please see a copy of my visit note below.    Video start: 7:02  Video end: 7:44  Type of video: [facility]    GI CLINIC VISIT    CC/REFERRING MD:  Referred Self  REASON FOR FOLLOW UP: Constipation, Nausea  Discussed Plan outlined below with Dr. Juana Ruvalcaba    ASSESSMENT/PLAN:  Ms. 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 Of note, she was previously seen for dysphagia and esophageal eosinophilia by Dr. Roland, who thought that the findings may be reflux-related in the setting of a hiatal hernia. EGD that was repeated after she completed an 8 week trial of high dose PPI showed resolution of eosinophils.  She is now s/p nissen fundoplication 2/2020 with small hiatal hernia and mild esophageal dysmotility per [facility] 4/2022 .  No further hematochezia (previously attributed to hemorrhoids).    # Nausea  # Bloating  # s/p Nissen fundoplication, 2/2020  She reports constant nausea for many years. She states that it is occasionally associated with eating, but cannot pinpoint associated factors. May be associated with underlying dysautonomia. She is taking Reglan ~4x/wk which she says helps - tries to limit use given risks and dyskinesia she has experienced. Has not yet tried blanca, but would like to try this. Could consider antiemetic in future if worsens however this could be constipating. She reports bloating frequently, especially since Nissen. She associates bloating with constipation, but also experiences when bowels are moving. She does drink carbonated sodas frequently and a number of gassy foods.  --  "lifestyle/dietary changes to reduce gas  -- RD referral to assist with dietary triggers and recommendations for nausea/bloating s/p nissen  -- Try ginger for nausea  -- Try simethicone as needed for gas    # Constipation  # Rectal intussusception   # Pelvic floor dysfunction  # Tom-Danlos syndrome  Constipation has been fairly chronic for patient, although has overall improved from previous assessments which she attributes to improvement in diet - focusing on whole foods, fruits, vegetables rather than processed foods. At baseline she is currently having a BM most days that is mushy/liqud but still feels full. Straining and using positional changes for most bowel movements. Feels \"full\" and bloated most days. Potentially some degree of overflow v mixed picture. Constipation is multifactorial with known pelvic floor dysfunction and rectal intussusception and likely exacerbated by pain medication and possibly dysautonomia. She has not followed up with pelvic floor center yet as surgery was recommended and she is worried about this with her Tom-Danlos syndrome. Discussed that reassessment would be helpful they could fully discuss all options. No BM x 1 week today after increase in pain meds with current pain flare. No severe abdominal pain, but mild diffuse discomfort and feeling full and bloated. She would like to try Senna today as this has worked for her in the past.  -- Follow up with [facility], referral sent today.  -- Continue good hydration  -- Start Miralax to achieve BM with current constipation and continue for maintenance therapy 1 cap daily (can increase or decrease as needed pending stool consistency).  -- Ok to use Senna for rescue when without a BM for several days, but should not be used on a daily basis. Plan to  today and use given no BM x 1 week. If no BM in the next 1-2 days or more abdominal discomfort, try enema.  -- Restart Linzess 145 mg per day as this has worked well in the past, could " increase to 290 mg per day if needed.  -- Consider referral to colorectal surgery after pelvic floor physical therapy pending recommendations     # Abdominal Cramping  # Diarrhea  Pt reports that she has episodes of diffuse abdominal pain followed by urgent diarrhea associated with her dysautonomic syndrome. This was previously happening frequently but now 2-3x/month. Potentially related to eating out. Future considerations include enteric coated peppermint oil, could also consider hyocyamine however this could be constipating.  -- No intervention today as making a number of other changes and overall improving.     # Colon cancer screening   Given EDS, colonoscopies may be too high risk. Will consider colon cancer screening via stool tests. Last colonoscopy 2017 with small hyperplastic sigmoid polyp with 10 year follow-up recommended.    ---colon cancer screening via stool tests      RTC 3-4 months    Thank you for this consultation.  It was a pleasure to participate in the care of this patient; please contact us with any further questions.     60 Minutes was spent on the date of the encounter during chart review, history and exam, documentation, and further activities as noted     Janeth Miller PA-C, RD  Division of Hepatology, Gastroenterology & Nutrition  [facility]      HPI  Ms. 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  Patient last seen 4/2021 with Dr. Griffith.      At the time of initial consultation 11/2017, she was in the process of work up for mastocytosis (urine showed elevated histamine levels, but patient was on antacid therapy and Mobic at that time). PMHx also includes 5 vaginal deliveries, migraines, cervicalgia, chronic pain syndrome, degenerative joint disease, chronic pan sinusitis.  She had been referred to the GI clinic for evaluation of possible IBS with mixed stools.      At consultation:  She has had lifelong symptoms of abdominal distension,  "bloating and borborygmi shortly after eating.  Stools alternates between loose stools and constipation. Usually, she has constipation in the form of hard stools and  incomplete evacuation. Performs splinting (applies posterior vaginal wall pressure with fingers) to help evacuate stool. Had \"pelvic floor releases\" with PT in [place], [place] and [facility]\" in [place], [place] with some help. Period of constipation will last for 1 month; during this time, will have 1 BM weekly with Miralax. Endorses lower abdominal pain that worsens with constipation.  Previously failed Miralax, senna, dulcolax, fiber, suppositories.  Period of constipation is then followed by several days of loose stools. Ms. Welch endorses occasional BRB with straining in the toilet and TP x 20 years. Cscope 3/2017 showed small mouthed diverticulosis in the sigmoid and 1 hyperplastic polyp; repeat cscope in 10 years was the recommendation. Uses buprenorphine patch (opioid) x 3 years. She saw [facility] with recommendation for biofeedback therapy. Rectal intussusception was noted and will likely need surgical repair in the future. Linzess had been very helpful with constipation and bloating and allowed for her to have a daily BM, however she did not return to the clinic to have it refilled and discontinued it.      She had been taking 20 mg prilosec during the day and pepcid at night with adequate heartburn control at consultation.     In 2018 she developed dysphagia.  This was assessed with EGD that was concerning for EoE grossly. She had been on prilosec 20mg + Pepcid prior to the scope. Had not olga on twice daily PPI prior to scope. Per Dr. Roland's assessment in 2017, the thought was that the eosinophilia (previously seen on prior endoscopy) may be 2/2 reflux in the setting of a hiatal hernia. She continued to have dysphagia to primarily meat, liquids were ok.  She had manometry that was abnormal with weak lower esophageal pressure, hiatal hernia, possible " short esophagus though stature of patient needs to be considered. peristalisis preserved. She then had hernia repair, nissen and PEG 2/10/20.     Over all symptoms went well, did not need to use PEG tube and was removed. Still has some issues with solids (mostly meats but can be breads, and need to drink quite a bit in order to help prevent getting stuck), liquids are ok unless she drinks too fast. No vomiting. No heartburn. She was taking omeprazole 40 mg BID and pepcid at night.       Every time she eats she experiences abdominal discomfort with cramping that last for hours.  Constantly nauseated. She is not on a bowel regimen (previously had been on linzess with good success).  Over the last couple weeks she has had a BM daily to every other day, however before this time she would go several days without a BM then have several stools in a row.  No blood in the stool.  She started an antiinflammatory diet which for her consists primarily of fruits, vegetables and meats.     She continues to use cannabis nightly for sleep. She takes Reglan BID (prescribed by neurologist and PCP, we discussed possible side effects today and danger of long term use, although none present now), takes naproxen once a week and continues to use buprenorphine pain pain weekly.      Interval history, 12/2020  Fundoplication went well. Feels that heartburn has improved significantly.  Last saw [facility] over a year ago. Was seen in 5/2018 with recommendations for biofeedback therapy and likely surgical correction of rectal intussusception. Ada did not undergo biofeedback therapy.  She had a follow up visit at the [facility] who recommended surgery but this was not scheduled due to prioritization of the nissen and due to other health issues, and then COVID.   In the meantime, restarted Linzess 145 mcg daily in Summer 2020, but this was not helpful.      Currently off Linzess. Alternates between regular BMs x 2 days, then diarrhea.      Had a cscope  "in 2017 with a small sigmoid polyp, that was hyperplastic.      Interval history, 4/2021  Continues on Reglan to avoid nausea, especially worse difficult to digest foods, like meat. Avoids steak, chicken, pork chops. Not seeing a nutritionist. Lost some weight semi-intentionally. Following a keto diet.      Having at least 1 BM per day. Fresh veggies help with regularity.   No longer on Linzess about a month ago; painless bright red blood in stool that occurred on Linzess and stopped with medication cessation. Not on OTC laxatives.   Has not followed up with [facility] yet.     Interval history 7/18/2022  Pt reports that she is currently in pain flare with significant back and leg pain and migranes. She has been able to manage symptoms from home with help of her neurology and pain team. Currently receiving Hydormophone for pain flare, typically uses Butrans patch and tramadol AM and PM for pain control. With added pain medication, she has not had a BM x 1 week. Taking probiotic and hydrating well but has not tried any laxatives yet. She reports that senna has worked well for her in the past when she had been several days without BM. She reports mushy stools prior to this week, but reports feeling \"full\" and requires positional changes and straining to have BM at baseline. She will sometimes go several days without BM before returning to this baseline. She reports back hurts more and diffuse abdominal discomfort and bloating when not having BM daily.    She has not followed up with [facility] because early appointment times and worried about surgery with her Tom Danlos Syndrome. Interested in returing and discussing all options with them including biofeedback.    Nauseous still every day - nothing specific aggravating sx. Takes Reglan 4-5 days (1-2 x). She feels Reglan helps but is trying to limit use with signficant side effects and recent dyskinesia.    She continues to take medical cannabis for sleep and pain " "- daily. Nausea for years \"life time baptiste\"  No melena, hematochezia.    Reports she has flares of dysautonomia which cause abdominal pain, cramping, and immediate diarrhea - used to happen frequently - now 2-3x/mo.    Eating carefully - cut out simple carbs, focusing on whole foods, removed processed pantry foods.Hydrating well. She reports that change in her diet has really helped her constipation and abdominal pain.    ROS:    No fevers or chills  No weight loss  No blurry vision, double vision or change in vision  No sore throat  No lymphadenopathy  No headache, paraesthesias, or weakness in a limb  No shortness of breath or wheezing  No chest pain or pressure  No arthralgias or myalgias  No rashes or skin changes  No odynophagia or dysphagia  No BRBPR, hematochezia, melena  No dysuria, frequency or urgency  No hot/cold intolerance or polyria  No anxiety or depression      PROBLEM LIST  Patient Active Problem List    Diagnosis Date Noted     Chronic sacroiliac joint pain 08/06/2021     Priority: Medium     Added automatically from request for surgery [id number]       Chronic right shoulder pain 02/17/2021     Priority: Medium     Arthropathy of cervical facet joint 01/25/2021     Priority: Medium     Added automatically from request for surgery [id number]       ERRONEOUS ENCOUNTER--DISREGARD 12/22/2020     Priority: Medium     Cervical pain 09/29/2020     Priority: Medium     Chronic pain of both shoulders 09/29/2020     Priority: Medium     Cervical facet joint syndrome 09/29/2020     Priority: Medium     Added automatically from request for surgery [id number]       Facet arthropathy, cervical 09/02/2020     Priority: Medium     Added automatically from request for surgery [id number]       Hiatal hernia 02/10/2020     Priority: Medium     s/p hiatal hernia repair, nissen, PEG, 2/10 01/22/2020     Priority: Medium     Added automatically from request for surgery [id number]       POTS (postural orthostatic tachycardia " syndrome) 10/23/2019     Priority: Medium     Autonomic dysfunction 06/07/2018     Priority: Medium     Low back pain of multiple sites of spine with sciatica 12/09/2017     Priority: Medium     Intractable chronic migraine without aura and without status migrainosus 12/09/2017     Priority: Medium     Obese 12/09/2017     Priority: Medium     Urinary urgency 08/30/2017     Priority: Medium     Nocturia 08/30/2017     Priority: Medium     Cervicalgia 08/25/2017     Priority: Medium     Headache 07/25/2017     Priority: Medium     Postlaminectomy syndrome, lumbar region 06/14/2017     Priority: Medium     Lymphadenopathy of left cervical region 03/27/2017     Priority: Medium     Easy bruising 10/13/2016     Priority: Medium     Head and neck lymphadenopathy 10/13/2016     Priority: Medium     Hepatomegaly 10/13/2016     Priority: Medium     Liver lesion 10/13/2016     Priority: Medium     Chronic left shoulder pain 09/05/2016     Priority: Medium     s/p TLIF L4-5,L5-S1 with solid arthrodesis 09/05/2016     Priority: Medium     Tom-Danlos syndrome 09/05/2016     Priority: Medium     Acquired hypothyroidism 07/22/2016     Priority: Medium     Anxiety 07/22/2016     Priority: Medium     Opioid dependence (H) 07/22/2016     Priority: Medium     Overview:   Treatment Agreement       Essential hypertension with goal blood pressure less than 140/90 07/22/2016     Priority: Medium     Low back pain 07/12/2016     Priority: Medium     Lumbar radiculopathy 02/18/2016     Priority: Medium     Encounter for genetic counseling and testing 01/05/2016     Priority: Medium     Overview:     [facility] Aortopathy Comprehensive Panel ordered 1/5/2016.       Sinus tachycardia 09/29/2015     Priority: Medium     Astigmatism 12/12/2014     Priority: Medium     Keratoconjunctivitis sicca (H) 12/12/2014     Priority: Medium     Presbyopia 12/12/2014     Priority: Medium     Opioid type dependence, continuous (H) 10/09/2014     Priority:  Medium     Other acute postoperative pain 10/09/2014     Priority: Medium     Anxiety disorder 08/21/2014     Priority: Medium     Pain disorder associated with psychological and physical factors 08/21/2014     Priority: Medium     Low back pain radiating to both legs 07/08/2014     Priority: Medium     Arthritis of facet joints at multiple vertebral levels 01/01/2014     Priority: Medium     Spondylolisthesis of lumbar region 12/10/2013     Priority: Medium     Lumbar degenerative disc disease 11/21/2013     Priority: Medium     DNS (deviated nasal septum) 10/09/2012     Priority: Medium     Nasal turbinate hypertrophy 10/09/2012     Priority: Medium     Dysuria 07/12/2011     Priority: Medium     Chronic sinusitis with recurrent bronchitis 02/01/2007     Priority: Medium     Allergic rhinitis 01/09/2007     Priority: Medium     Cyst of fallopian tube 12/28/1996     Priority: Medium     Arthritis involving multiple sites 01/01/1986     Priority: Medium     Uterine endometriosis 01/01/1986     Priority: Medium       PERTINENT PAST MEDICAL HISTORY:  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     Cervicalgia      Chronic sinusitis 00/2007    Diagnosed with chronic pansinusitis 2016     Coagulation disorder (H)     factor 8     CSF leak     Chiari malformation     Depression      Tom-Danlos disease      Eosinophilic esophagitis 08/2018     Gastroesophageal reflux disease 3/1/2017    I have large hiatal hernia     Hiatal hernia 2016    Have adeline hiatel hernia     Hoarseness Unsure    It comes and goes     Hypertension      Hypothyroid      IBS (irritable bowel syndrome) with constipation     improved on Linzess      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     Irregular heart beat      Migraines 1/1/2007    Unsure of exact date     Nasal polyps 2013    Were revoved 03/2017, benign     Orthostatic hypotension      Other nervous system complications 1/2014    Autonomic nervous system disorder, neuralgia, neuropathy     Swelling, mass, or lump in head and neck 08/2016    Lymph node has been growing for last year     Thyroid disease 01/01/2008     Urinary incontinence      Urinary urgency        PREVIOUS SURGERIES:  Past Surgical History:   Procedure Laterality Date     AS REPAIR OF NASAL SEPTUM  2009    repair broke nose     BACK SURGERY  12/09/2013  10/01/2014    Spinal fusion L4-S1, L5 moving 5/8ths in. Remove screws/rods     BIOPSY  01/01/2008 & 3/2017    mole biopsy, lymph node biopsy     COLONOSCOPY  3/2017    Colonoscopy and GI     ESOPHAGEAL IMPEDENCE FUNCTION TEST WITH 24 HOUR PH GREATER THAN 1 HOUR N/A 9/17/2019    Procedure: IMPEDANCE PH STUDY, ESOPHAGUS, 24 HOUR;  Surgeon: Raghavendra Grande MD;  Location:  GI     ESOPHAGOSCOPY, GASTROSCOPY, DUODENOSCOPY (EGD), COMBINED N/A 8/3/2018    Procedure: COMBINED ESOPHAGOSCOPY, GASTROSCOPY, DUODENOSCOPY (EGD), BIOPSY SINGLE OR MULTIPLE;;  Surgeon: Juan Woodson MD;  Location: [facility] GI     ESOPHAGOSCOPY, GASTROSCOPY, DUODENOSCOPY (EGD), COMBINED N/A 10/22/2018    Procedure: COMBINED ESOPHAGOSCOPY, GASTROSCOPY, DUODENOSCOPY (EGD), BIOPSY SINGLE OR MULTIPLE;  Surgeon: Sadia Carolina MD;  Location: [facility] GI     ESOPHAGOSCOPY, GASTROSCOPY, DUODENOSCOPY (EGD), COMBINED N/A 12/17/2018    Procedure: COMBINED ESOPHAGOSCOPY, GASTROSCOPY, DUODENOSCOPY (EGD);  Surgeon: Juan Woodson MD;  Location: [facility] GI     INJECT BLOCK MEDIAL BRANCH CERVICAL/THORACIC/LUMBAR Left 9/24/2020    Procedure: BLOCK, NERVE, FACET JOINT, MEDIAL BRANCH, DIAGNOSTIC;  Surgeon: Faiza Martinez MD;  [facility]     INJECT BLOCK MEDIAL BRANCH CERVICAL/THORACIC/LUMBAR Bilateral  10/8/2020    Procedure: Bilateral cervical 3, cervical 4, and cervical 5 medial branch nerve block;  Surgeon: Faiza Martinez MD;  Location: [facility] OR     INJECT BLOCK MEDIAL BRANCH CERVICAL/THORACIC/LUMBAR Right 3/4/2021    Procedure: Cervical 3, cervical 4, cervical 5 medial branch nerve blocks to target C3-C4 and C4-C5 facet joint;  Surgeon: Faiza Martinez MD;  Location: [facility] OR     LAPAROSCOPIC HERNIORRHAPHY HIATAL N/A 2/10/2020    Procedure: LAPAROSCOPIC HIATAL HERNIA REPAIR, NISSEN FUNDOPLICATION, ESOPHOGASTRODUODENOSCOPY, PEG TUBE PLACEMENT.;  Surgeon: Alana Mack MD;  Location: [facility] OR     NASAL/SINUS POLYPECTOMY  2017    Polyps were benign     NOSE SURGERY  2007    deviated septum     TONSILLECTOMY & ADENOIDECTOMY  1981     TUBAL LIGATION  07/01/2011       PREVIOUS ENDOSCOPY:  Colonoscopy   EGD 8/2017: findings s/f EoE, 3 cm HH. Path showed 40 eos/HPF in distal and mid esophagus. Mild acute inflammation and occasional eos.   EGD 3/2017: EGD with ringed esophagus with distal bx showing increased eos, min eos in mid esophagus.   cscope 3/2017: small-mouthed, mild diverticulosis, 1 small hyperplastic polyp. Recommended to repeat cscope in 10 years (given path).    OTHER PERTINENT STUDIES:  4/21/22 [facility]:  Impression:   1. Changes of hernia repair and fundoplication with tiny hiatal  herniation of the gastric wrap. The GE junction is positioned  normally. No gastroesophageal reflux identified.  2. Mild esophageal dysmotility.  3. Incidentally noted small duodenal diverticulum off the first  Portion.    GES 10/2017: rapid gastric emptying    Video swallow: mod HH (mixed type), deep penetration without aspiration.     ALLERGIES:     Allergies   Allergen Reactions     Baclofen Other (See Comments)     Loss of muscle tone. Muscle weakness.     Bee Venom Shortness Of Breath, Palpitations, Anaphylaxis and Difficulty breathing     Patient does carry Epi-Pen     Chicken-Derived Products (Egg) Nausea and  Diarrhea     Compazine [Prochlorperazine]      Extreme jittery     Food      Milk     Gabapentin      Dizzy     Gluten Meal GI Disturbance     Wheat bran and wheat     Pregabalin      Seasonal Allergies      Dust, mold     Topiramate      Pt does not remember reaction       PERTINENT MEDICATIONS:    Current Outpatient Medications:      acetaminophen (TYLENOL) 500 MG tablet, Take 1,000 mg by mouth every 6 hours as needed for mild pain, Disp: , Rfl:      buprenorphine (BUTRANS) 10 MCG/HR WK patch, Place 1 patch onto the skin every 7 days, Disp: 4 patch, Rfl: 1     cetirizine (ZYRTEC) 10 MG tablet, Take 10 mg by mouth daily as needed , Disp: , Rfl:      co-enzyme Q-10 100 MG CAPS capsule, Take 100 mg by mouth daily, Disp: , Rfl:      DULoxetine (CYMBALTA) 30 MG capsule, Take 60mg in the morning and 30mg at bedtime, Disp: 270 capsule, Rfl: 1     EPINEPHrine (EPIPEN/ADRENACLICK/OR ANY BX GENERIC EQUIV) 0.3 MG/0.3ML injection 2-pack, INJECT 0.3 MLS (0.3 MG) INTO THE MUSCLE AS NEEDED FOR ANAPHYLAXIS, Disp: , Rfl: 1     ferrous fumarate 65 mg, [place]. FE,-Vitamin C 125 mg (VITRON-C)  MG TABS tablet, Take 1 tablet by mouth every other day, Disp: 60 tablet, Rfl: 4     fluticasone (FLONASE) 50 MCG/ACT nasal spray, Spray 2 sprays into both nostrils daily Call clinic to schedule follow up appointment., Disp: 48 mL, Rfl: 11     HYDROmorphone (DILAUDID) 2 MG tablet, Take 2 tablets (4 mg) by mouth every 6 hours as needed for pain, Disp: 60 tablet, Rfl: 0     ivabradine (CORLANOR) 7.5 MG tablet, Take 1 tab (7.5mg)  In AM and half tab (3.75mg) in afternoon, Disp: 135 tablet, Rfl: 3     Lidocaine (LIDOCARE) 4 % Patch, Place 1-3 patches onto the skin every 24 hours To prevent lidocaine toxicity, patient should be patch free for 12 hrs daily., Disp: 20 patch, Rfl: 0     medical cannabis (Patient's own supply.  Not a prescription), Take 1 Dose by mouth See Admin Instructions (This is NOT a prescription, and does not certify that the  patient has a qualifying medical condition for medical cannabis.  The purpose of this order is  to document that the patient reports taking medical cannabis.)   Capsule formulation - uses as needed (currently out of this and not using as of January 28, 2020), Disp: , Rfl:      methocarbamol (ROBAXIN) 500 MG tablet, Take 1 tablet (500 mg) by mouth 4 times daily as needed for muscle spasms, Disp: 120 tablet, Rfl: 1     metoclopramide (REGLAN) 10 MG tablet, Take 1 tablet (10 mg) by mouth 4 times daily as needed (headache), Disp: 40 tablet, Rfl: 3     Multiple Vitamins-Minerals (MULTIVITAMIN PO), Take 1 tablet by mouth daily , Disp: , Rfl:      naloxone (NARCAN) 4 MG/0.1ML nasal spray, Spray 1 spray (4 mg) into one nostril alternating nostrils once as needed for opioid reversal every 2-3 minutes until assistance arrives, Disp: 0.2 mL, Rfl: 0     naproxen sodium (ANAPROX) 220 MG tablet, Take 440-660 mg by mouth daily as needed for moderate pain, Disp: , Rfl:      Probiotic Product (PROBIOTIC DAILY PO), Take 2 packets by mouth every morning , Disp: , Rfl:      Rhubarb (ESTROVEN COMPLETE) 4 MG TABS, Take 1 tablet by mouth, Disp: , Rfl:      rizatriptan (MAXALT-MLT) 5 MG ODT, TAKE 1-2 TABLETS (5-10 MG) BY MOUTH AT ONSET OF HEADACHE FOR MIGRAINE (MAX 30 MG IN 24 HOURS), Disp: 18 tablet, Rfl: 11     SUMAtriptan (IMITREX STATDOSE) 6 MG/0.5ML pen injector kit, Inject 0.5 ml (6 mg) subcutaneously at onset of migraine, May repeat in 1 hour , Max 12 mg/24 hrs, Disp: 2 kit, Rfl: 11     tiZANidine (ZANAFLEX) 4 MG tablet, Take 1 tablet (4 mg) by mouth 4 times daily as needed for muscle spasms, Disp: 360 tablet, Rfl: 3     traMADol (ULTRAM) 50 MG tablet, Take 1 tablet (50 mg) by mouth every 6 hours as needed for severe pain TAKE 1 TABLET BY MOUTH EVERY 6 HOURS AS NEEDED FOR SEVERE PAIN, Disp: 60 tablet, Rfl: 1     vitamin B complex with vitamin C (STRESS TAB) tablet, Take 1 tablet by mouth daily, Disp: , Rfl:      VITAMIN D,  CHOLECALCIFEROL, PO, Take 2,000 Units by mouth daily, Disp: , Rfl:     Current Facility-Administered Medications:      Botulinum Toxin Type A (BOTOX) 200 units injection 155 Units, 155 Units, Intramuscular, See Admin Instructions, Noemy Nguyen MD, 155 Units at 22 1259   No other NSAID/anticoagulation reported by patient.  Taking OTC Probiotics    SOCIAL HISTORY:  Medical cannabis nightly for sleep and pain  Social History     Socioeconomic History     Marital status:      Spouse name: Carry     Number of children: 5     Years of education: Not on file     Highest education level: Not on file   Occupational History     Not on file   Tobacco Use     Smoking status: Former Smoker     Packs/day: 0.20     Years: 10.00     Pack years: 2.00     Types: Cigarettes     Start date: 1991     Quit date: 2013     Years since quittin.6     Smokeless tobacco: Never Used   Substance and Sexual Activity     Alcohol use: Yes     Comment: once a month     Drug use: Yes     Types: Marijuana     Comment: medical marijuana     Sexual activity: Yes     Partners: Male     Birth control/protection: Female Surgical   Other Topics Concern     Parent/sibling w/ CABG, MI or angioplasty before 65F 55M? No   Social History Narrative    5 kids: 26 son, 25 daughter, 21 daughter, 15 Leslie, 9 Ibeth yo     works at [employer] for disability        3 yo and 6 months grandchildren Robson     Social Determinants of Health     Financial Resource Strain: Not on file   Food Insecurity: Not on file   Transportation Needs: Not on file   Physical Activity: Not on file   Stress: Not on file   Social Connections: Not on file   Intimate Partner Violence: Not on file   Housing Stability: Not on file       FAMILY HISTORY:  No colon/panc/esophageal/other GI CA. No other Faustin or other HPS-related Alex. No IBD or celiac disease. No other Autoimmune, Liver, or Thyroid disease.  Family History    Problem Relation Age of Onset     Connective Tissue Disorder Mother         eds     Connective Tissue Disorder Sister         eds     Cancer Father         Spinal tumor grew into spinal cord, went in brain     Migraines Father      Diabetes Maternal Grandmother         Elderly diabetes     Heart Disease Maternal Grandmother      Hypertension Maternal Grandmother      Diabetes Paternal Grandmother         Elderly diabetes     Diabetes Paternal Grandfather         Elderly diabetes     Asthma Sister         Pediatric Asthma     Migraines Sister      Asthma Son         Pediatric Asthma     Thyroid Disease Sister         ,     Migraines Sister      Migraines Sister      Breast Cancer No family hx of        Past/family/social history reviewed and no changes      PHYSICAL EXAMINATION:  Constitutional: AAOx3, cooperative, pleasant, not dyspneic/diaphoretic, no acute distress  Vitals reviewed: LMP  (LMP Unknown)   Wt:   Wt Readings from Last 2 Encounters:   06/24/22 83 kg (183 lb)   03/10/22 80.9 kg (178 lb 6.4 oz)     General appearance: Healthy appearing adult, in no acute distress  Eyes: Sclera anicteric, Pupils round and reactive to light  Ears, nose, mouth and throat: No obvious external lesions of ears and nose, Hearing intact  Neck: Symmetric, No obvious external lesions  Respiratory: Normal respiration, no use of accessory muscles   MSK: Gait normal  Skin: No rashes or jaundice   Psychiatric: Oriented to person, place and time, Appropriate mood and affect.       PERTINENT STUDIES:  Lab on 06/24/2022   Component Date Value Ref Range Status     Ferritin 06/24/2022 21  8 - 252 ng/mL Final     Iron 06/24/2022 116  35 - 180 ug/dL Final     Iron Binding Capacity 06/24/2022 413  240 - 430 ug/dL Final     Iron Sat Index 06/24/2022 28  15 - 46 % Final     WBC Count 06/24/2022 6.5  4.0 - 11.0 10e3/uL Final     RBC Count 06/24/2022 4.74  3.80 - 5.20 10e6/uL Final     Hemoglobin 06/24/2022 14.8  11.7 - 15.7 g/dL Final      Hematocrit 06/24/2022 43.5  35.0 - 47.0 % Final     MCV 06/24/2022 92  78 - 100 fL Final     MCH 06/24/2022 31.2  26.5 - 33.0 pg Final     MCHC 06/24/2022 34.0  31.5 - 36.5 g/dL Final     RDW 06/24/2022 12.1  10.0 - 15.0 % Final     Platelet Count 06/24/2022 238  150 - 450 10e3/uL Final     % Neutrophils 06/24/2022 68  % Final     % Lymphocytes 06/24/2022 23  % Final     % Monocytes 06/24/2022 6  % Final     % Eosinophils 06/24/2022 2  % Final     % Basophils 06/24/2022 1  % Final     % Immature Granulocytes 06/24/2022 0  % Final     NRBCs per 100 WBC 06/24/2022 0  <1 /100 Final     Absolute Neutrophils 06/24/2022 4.4  1.6 - 8.3 10e3/uL Final     Absolute Lymphocytes 06/24/2022 1.5  0.8 - 5.3 10e3/uL Final     Absolute Monocytes 06/24/2022 0.4  0.0 - 1.3 10e3/uL Final     Absolute Eosinophils 06/24/2022 0.2  0.0 - 0.7 10e3/uL Final     Absolute Basophils 06/24/2022 0.1  0.0 - 0.2 10e3/uL Final     Absolute Immature Granulocytes 06/24/2022 0.0  <=0.4 10e3/uL Final     Absolute NRBCs 06/24/2022 0.0  10e3/uL Final     Again, thank you for allowing me to participate in the care of your patient.      Sincerely,    Janeth Miller PA-C

## 2022-07-18 NOTE — PATIENT INSTRUCTIONS
It was a pleasure taking care of you today.  I've included a brief summary of our discussion and care plan from today's visit below.  Please review this information with your primary care provider.  ______________________________________________________________________    -- Start Miralax and Senna to achieve BM with current constipation. f no BM in the next 1-2 days or more abdominal discomfort, try enema.  -- Restart Linzess 145 mg per day - sending prescriptionMy recommendations are summarized as follows:  -- Continue Miralax for maintenance therapy 1 cap daily (can increase or decrease as needed pending stool consistency).  -- Ok to use Senna for rescue when without a BM for several days, but should not be used on a daily basis.   -- Follow up with pelvic floor center. We will send referral.  -- Try ginger for nausea (Gin Gins or whatever you find at your grocery store)  -- Try simethicone as needed for gas  -- RD referral   -- Continue good hydration  -- limit carbonated beverages, straws, gum, hard candy to reduce air ingestion      -- please see scheduling information provided below     Return to GI Clinic in 3-4 months to review your progress.    ______________________________________________________________________    How do I schedule labs, imaging studies, or procedures that were ordered in clinic today?     Labs: To schedule lab appointment at the [facility], use my chart or call [phone]. If you have a [place] lab closer to home where you are regularly seen you can give them a call.     Procedures: If a colonoscopy, upper endoscopy, breath test, esophageal manometry, or pH impedence was ordered today, our endoscopy team will call you to schedule this. If you have not heard from our endoscopy team within a week, please call [phone] to schedule.     Imaging Studies: If you were scheduled for a CT scan, X-ray, MRI, ultrasound, HIDA scan or other imaging study, please call  [phone] to have this scheduled.     Referral: If a referral to another specialty was ordered, expect a phone call or follow instructions above. If you have not heard from anyone regarding your referral in a week, please call our clinic to check the status.     Who do I call with any questions after my visit?  Please be in touch if there are any further questions that arise following today's visit.  There are multiple ways to contact your gastroenterology care team.      During business hours, you may reach a Gastroenterology nurse at [phone]    To schedule or reschedule an appointment, please call [phone].     You can always send a secure message through [employer].  [employer] messages are answered by your nurse or doctor typically within 24 hours.  Please allow extra time on weekends and holidays.      For urgent/emergent questions after business hours, you may reach the on-call GI Fellow by contacting the [facility]  at [phone].     How will I get the results of any tests ordered?    You will receive all of your results.  If you have signed up for [employer], any tests ordered at your visit will be available to you after your provider reviews them.  Typically this takes 1-2 weeks.  If there are urgent results that require a change in your care plan, your provider or nurse will call you to discuss the next steps.      What is [employer]?  [employer] is a secure way for you to access all of your healthcare records from the [facility].  It is a web based computer program, so you can sign on to it from any location.  It also allows you to send secure messages to your care team.  I recommend signing up for [employer] access if you have not already done so and are comfortable with using a computer.      How to I schedule a follow-up visit?  If you did not schedule a follow-up visit today, please call [phone] to schedule a follow-up office visit.      Sincerely,    Janeth Miller,  SIMONE  Division of Gastroenterology, Hepatology & Nutrition  [facility]

## 2022-07-18 NOTE — NURSING NOTE
Unable to reach patient to complete rooming process. Provider informed and aware as she is also trying to reach patient herself for pharmacy information.     Erika Tijerina CMA

## 2022-07-19 ENCOUNTER — ANCILLARY PROCEDURE (OUTPATIENT)
Dept: CARDIOLOGY | Facility: CLINIC | Age: 52
End: 2022-07-19
Attending: INTERNAL MEDICINE
Payer: COMMERCIAL

## 2022-07-19 VITALS
OXYGEN SATURATION: 95 % | SYSTOLIC BLOOD PRESSURE: 124 MMHG | HEART RATE: 71 BPM | DIASTOLIC BLOOD PRESSURE: 87 MMHG | HEIGHT: 67 IN | BODY MASS INDEX: 28.38 KG/M2 | WEIGHT: 180.8 LBS

## 2022-07-19 DIAGNOSIS — G90.A POSTURAL ORTHOSTATIC TACHYCARDIA SYNDROME: ICD-10-CM

## 2022-07-19 DIAGNOSIS — G90.A POSTURAL ORTHOSTATIC TACHYCARDIA SYNDROME: Primary | ICD-10-CM

## 2022-07-19 PROCEDURE — 93270 REMOTE 30 DAY ECG REV/REPORT: CPT

## 2022-07-19 PROCEDURE — 93005 ELECTROCARDIOGRAM TRACING: CPT | Mod: XU

## 2022-07-19 PROCEDURE — 99215 OFFICE O/P EST HI 40 MIN: CPT | Performed by: INTERNAL MEDICINE

## 2022-07-19 PROCEDURE — 93272 ECG/REVIEW INTERPRET ONLY: CPT | Performed by: INTERNAL MEDICINE

## 2022-07-19 PROCEDURE — G0463 HOSPITAL OUTPT CLINIC VISIT: HCPCS | Mod: 25

## 2022-07-19 ASSESSMENT — PAIN SCALES - GENERAL: PAINLEVEL: NO PAIN (0)

## 2022-07-19 NOTE — PATIENT INSTRUCTIONS
You were seen in the Electrophysiology Clinic today by: Dr Russell    Plan:   Medication Changes:   RESTART- Propranolol ER 120mg- 3 times a week    Labs/Tests Needed:  7 day Biotel heart monitor    Follow up visit:  1 year with Dr Russell  Please reach out to us regarding your heart monitor results if you do not hear from us within 1 month        Your Care Team:  [facility]   Telephone Number     Nurse Line  Cheyenne Coyne RN  [phone]     For scheduling appts:   Alexandre    For procedures:    Arlyn Amezcua   [phone]   For the Device Clinic (Pacemakers, ICDs, Loop Recorders)    During business hours: [phone]  After business hours:   [phone]- select option 4 and ask for job code [id number].     On-call cardiologist for after hours or on weekends: [phone], option #4, and ask to speak to the on-call cardiologist.     Cardiovascular Clinic:   [address] [place] [address]. [place] [address]      As always, Thank you for trusting us with your health care needs!

## 2022-07-19 NOTE — PROGRESS NOTES
HPI: \  Ms. Scar Welch is a 51 year-old female who presents  for follow up of autonomic dysfuction.  The patient has a past medical history significant for dysautonomia and Tom-Danlos.      Ada is currently  nad been taking propranolol  qday as well as ivabradine 7.5 mg twice daily.  However the propranolol has been held.    In terms of symptoms Seema has done relatively well but still has problems with autonomic fluctuations with high heart rates recorded on her iPhone.  We do not have ECGs and that would be helpful in trying to assess the severity and burden of her high heart rates.  We will obtain a bio tell recording over the next week or so to address this issue.    In terms of medications we will restart propranolol 120 extended release 3 times weekly and continue her ivabradine.  We will contact her after the cardiac monitor is completed.  In terms of follow-up clinic visit we will initially schedule I year and adapt as needed.    ECG today reveals a heart rate of 63/min and normal QRS and QT with normal QRS frontal axis.  ECG is within normal limits.    PAST MEDICAL HISTORY:  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     Cervicalgia      Chronic sinusitis 00/2007    Diagnosed with chronic pansinusitis 2016     Coagulation disorder (H)     factor 8     CSF leak     Chiari malformation     Depression      Tom-Danlos disease      Eosinophilic esophagitis 08/2018     Gastroesophageal reflux disease 3/1/2017    I have large hiatal hernia     Hiatal hernia 2016    Have adeline hiatel hernia     Hoarseness Unsure    It comes and goes     Hypertension      Hypothyroid      IBS (irritable bowel  syndrome) with constipation     improved on Linzess     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     Irregular heart beat      Migraines 1/1/2007    Unsure of exact date     Nasal polyps 2013    Were revoved 03/2017, benign     Orthostatic hypotension      Other nervous system complications 1/2014    Autonomic nervous system disorder, neuralgia, neuropathy     Swelling, mass, or lump in head and neck 08/2016    Lymph node has been growing for last year     Thyroid disease 01/01/2008     Urinary incontinence      Urinary urgency        CURRENT MEDICATIONS:  Current Outpatient Medications   Medication Sig Dispense Refill     acetaminophen (TYLENOL) 500 MG tablet Take 1,000 mg by mouth every 6 hours as needed for mild pain       buprenorphine (BUTRANS) 10 MCG/HR WK patch Place 1 patch onto the skin every 7 days 4 patch 1     cetirizine (ZYRTEC) 10 MG tablet Take 10 mg by mouth daily as needed        co-enzyme Q-10 100 MG CAPS capsule Take 100 mg by mouth daily       DULoxetine (CYMBALTA) 30 MG capsule Take 60mg in the morning and 30mg at bedtime 270 capsule 1     EPINEPHrine (EPIPEN/ADRENACLICK/OR ANY BX GENERIC EQUIV) 0.3 MG/0.3ML injection 2-pack INJECT 0.3 MLS (0.3 MG) INTO THE MUSCLE AS NEEDED FOR ANAPHYLAXIS  1     ferrous fumarate 65 mg, [place]. FE,-Vitamin C 125 mg (VITRON-C)  MG TABS tablet Take 1 tablet by mouth every other day 60 tablet 4     fluticasone (FLONASE) 50 MCG/ACT nasal spray Spray 2 sprays into both nostrils daily Call clinic to schedule follow up appointment. 48 mL 11     HYDROmorphone (DILAUDID) 2 MG tablet Take 2 tablets (4 mg) by mouth every 6 hours as needed for pain 60 tablet 0     ivabradine (CORLANOR) 7.5 MG tablet Take 1 tab (7.5mg)  In AM and half tab (3.75mg) in afternoon 135 tablet 3     Lidocaine (LIDOCARE) 4 % Patch Place 1-3 patches onto the skin every 24 hours To prevent lidocaine toxicity, patient should be patch free for 12 hrs daily. 20 patch 0      linaclotide (LINZESS) 145 MCG capsule Take 1 capsule (145 mcg) by mouth every morning (before breakfast) 30 capsule 11     medical cannabis (Patient's own supply.  Not a prescription) Take 1 Dose by mouth See Admin Instructions (This is NOT a prescription, and does not certify that the patient has a qualifying medical condition for medical cannabis.  The purpose of this order is  to document that the patient reports taking medical cannabis.)     Capsule formulation - uses as needed (currently out of this and not using as of January 28, 2020)       methocarbamol (ROBAXIN) 500 MG tablet Take 1 tablet (500 mg) by mouth 4 times daily as needed for muscle spasms 120 tablet 1     metoclopramide (REGLAN) 10 MG tablet Take 1 tablet (10 mg) by mouth 4 times daily as needed (headache) 40 tablet 3     Multiple Vitamins-Minerals (MULTIVITAMIN PO) Take 1 tablet by mouth daily        naloxone (NARCAN) 4 MG/0.1ML nasal spray Spray 1 spray (4 mg) into one nostril alternating nostrils once as needed for opioid reversal every 2-3 minutes until assistance arrives 0.2 mL 0     naproxen sodium (ANAPROX) 220 MG tablet Take 440-660 mg by mouth daily as needed for moderate pain       Probiotic Product (PROBIOTIC DAILY PO) Take 2 packets by mouth every morning        Rhubarb (ESTROVEN COMPLETE) 4 MG TABS Take 1 tablet by mouth       rizatriptan (MAXALT-MLT) 5 MG ODT TAKE 1-2 TABLETS (5-10 MG) BY MOUTH AT ONSET OF HEADACHE FOR MIGRAINE (MAX 30 MG IN 24 HOURS) 18 tablet 11     SUMAtriptan (IMITREX STATDOSE) 6 MG/0.5ML pen injector kit Inject 0.5 ml (6 mg) subcutaneously at onset of migraine, May repeat in 1 hour , Max 12 mg/24 hrs 2 kit 11     tiZANidine (ZANAFLEX) 4 MG tablet Take 1 tablet (4 mg) by mouth 4 times daily as needed for muscle spasms 360 tablet 3     traMADol (ULTRAM) 50 MG tablet Take 1 tablet (50 mg) by mouth every 6 hours as needed for severe pain TAKE 1 TABLET BY MOUTH EVERY 6 HOURS AS NEEDED FOR SEVERE PAIN 60 tablet 1      vitamin B complex with vitamin C (STRESS TAB) tablet Take 1 tablet by mouth daily       VITAMIN D, CHOLECALCIFEROL, PO Take 2,000 Units by mouth daily         PAST SURGICAL HISTORY:  Past Surgical History:   Procedure Laterality Date     AS REPAIR OF NASAL SEPTUM  2009    repair broke nose     BACK SURGERY  12/09/2013  10/01/2014    Spinal fusion L4-S1, L5 moving 5/8ths in. Remove screws/rods     BIOPSY  01/01/2008 & 3/2017    mole biopsy, lymph node biopsy     COLONOSCOPY  3/2017    Colonoscopy and GI     ESOPHAGEAL IMPEDENCE FUNCTION TEST WITH 24 HOUR PH GREATER THAN 1 HOUR N/A 9/17/2019    Procedure: IMPEDANCE PH STUDY, ESOPHAGUS, 24 HOUR;  Surgeon: Raghavendra Grande MD;  Location: [facility]     ESOPHAGOSCOPY, GASTROSCOPY, DUODENOSCOPY (EGD), COMBINED N/A 8/3/2018    Procedure: COMBINED ESOPHAGOSCOPY, GASTROSCOPY, DUODENOSCOPY (EGD), BIOPSY SINGLE OR MULTIPLE;;  Surgeon: Juan Woodson MD;  Location: [facility]     ESOPHAGOSCOPY, GASTROSCOPY, DUODENOSCOPY (EGD), COMBINED N/A 10/22/2018    Procedure: COMBINED ESOPHAGOSCOPY, GASTROSCOPY, DUODENOSCOPY (EGD), BIOPSY SINGLE OR MULTIPLE;  Surgeon: Sadia Carolina MD;  Location: [facility]     ESOPHAGOSCOPY, GASTROSCOPY, DUODENOSCOPY (EGD), COMBINED N/A 12/17/2018    Procedure: COMBINED ESOPHAGOSCOPY, GASTROSCOPY, DUODENOSCOPY (EGD);  Surgeon: Juan Woodson MD;  Location: [facility]     INJECT BLOCK MEDIAL BRANCH CERVICAL/THORACIC/LUMBAR Left 9/24/2020    Procedure: BLOCK, NERVE, FACET JOINT, MEDIAL BRANCH, DIAGNOSTIC;  Surgeon: Faiza Martinez MD;  Location: [facility]     INJECT BLOCK MEDIAL BRANCH CERVICAL/THORACIC/LUMBAR Bilateral 10/8/2020    Procedure: Bilateral cervical 3, cervical 4, and cervical 5 medial branch nerve block;  Surgeon: Faiza Martinez MD;  Location: [facility]     INJECT BLOCK MEDIAL BRANCH CERVICAL/THORACIC/LUMBAR Right 3/4/2021    Procedure: Cervical 3, cervical 4, cervical 5 medial branch nerve blocks to target C3-C4 and C4-C5 facet joint;  Surgeon: Juan  Faiza OLIVEIRA MD;  Location: [facility]     LAPAROSCOPIC HERNIORRHAPHY HIATAL N/A 2/10/2020    Procedure: LAPAROSCOPIC HIATAL HERNIA REPAIR, NISSEN FUNDOPLICATION, ESOPHOGASTRODUODENOSCOPY, PEG TUBE PLACEMENT.;  Surgeon: Alana Mack MD;  Location: [facility]     NASAL/SINUS POLYPECTOMY  2017    Polyps were benign     NOSE SURGERY  2007    deviated septum     TONSILLECTOMY & ADENOIDECTOMY  1981     TUBAL LIGATION  07/01/2011       ALLERGIES:     Allergies   Allergen Reactions     Baclofen Other (See Comments)     Loss of muscle tone. Muscle weakness.     Bee Venom Shortness Of Breath, Palpitations, Anaphylaxis and Difficulty breathing     Patient does carry Epi-Pen     Chicken-Derived Products (Egg) Nausea and Diarrhea     Compazine [Prochlorperazine]      Extreme jittery     Food      Milk     Gabapentin      Dizzy     Gluten Meal GI Disturbance     Wheat bran and wheat     Pregabalin      Seasonal Allergies      Dust, mold     Topiramate      Pt does not remember reaction       FAMILY HISTORY:  Family History   Problem Relation Age of Onset     Connective Tissue Disorder Mother         eds     Connective Tissue Disorder Sister         eds     Cancer Father         Spinal tumor grew into spinal cord, went in brain     Migraines Father      Diabetes Maternal Grandmother         Elderly diabetes     Heart Disease Maternal Grandmother      Hypertension Maternal Grandmother      Diabetes Paternal Grandmother         Elderly diabetes     Diabetes Paternal Grandfather         Elderly diabetes     Asthma Sister         Pediatric Asthma     Migraines Sister      Asthma Son         Pediatric Asthma     Thyroid Disease Sister         ,     Migraines Sister      Migraines Sister      Breast Cancer No family hx of      SOCIAL HISTORY:  Social History     Tobacco Use     Smoking status: Former Smoker     Packs/day: 0.20     Years: 10.00     Pack years: 2.00     Types: Cigarettes     Start date: 1/1/1991     Quit date: 12/1/2013     Years  since quittin.6     Smokeless tobacco: Never Used   Substance Use Topics     Alcohol use: Yes     Comment: once a month     Drug use: Yes     Types: Marijuana     Comment: medical marijuana       ROS:   Constitutional: No fever, chills, or sweats. Weight stable.   ENT: No visual disturbance, ear ache, epistaxis, sore throat.   Cardiovascular: As per HPI.   Respiratory: No cough, hemoptysis.    GI: No nausea, vomiting, .   : No hematuria.   Integument: Negative.   Psychiatric: Negative.   Hematologic:   no easy bleeding.  Neuro: Negative.   Endocrinology: No significant heat or cold intolerance   Musculoskeletal: No myalgia.    Exam:  LMP  (LMP Unknown)   GENERAL APPEARANCE: healthy, alert and no distress  HEENT: no icterus, , no central cyanosis  NECK: no adenopathy,JVP not elevated  RESPIRATORY:no rales, rhonchi or wheezes, no use of accessory muscles, no retractions, respirations are unlabored, normal respiratory rate  CARDIOVASCULAR: regular rhythm,ABDOMEN: soft, non tender, without hepatosplenomegaly, no masses palpable, bowel sounds normal, aorta not enlarged by palpation, no abdominal bruits  EXTREMITIES: peripheral pulses normal, no edema, no bruits  NEURO: alert and oriented to person/place/time, normal speech, gait and affect  SKIN: no ecchymoses, no rashes    Labs:  CBC RESULTS:   Lab Results   Component Value Date    WBC 6.5 2022    WBC 6.2 2020    RBC 4.74 2022    RBC 4.42 2020    HGB 14.8 2022    HGB 12.1 2020    HCT 43.5 2022    HCT 38.3 2020    MCV 92 2022    MCV 87 2020    MCH 31.2 2022    MCH 27.4 2020    MCHC 34.0 2022    MCHC 31.6 2020    RDW 12.1 2022    RDW 14.6 2020     2022     2020       BMP RESULTS:  Lab Results   Component Value Date     2020    POTASSIUM 4.5 2020    CHLORIDE 102 2020    CO2 29 2020    ANIONGAP 4 2020    GLC 96  11/13/2020    BUN 10 11/13/2020    CR 0.83 11/13/2020    GFRESTIMATED 83 11/13/2020    GFRESTBLACK >90 11/13/2020    JUAN 8.6 11/13/2020        INR RESULTS:  Lab Results   Component Value Date    INR 0.93 01/30/2020    INR 0.97 02/28/2019    INR 0.90 12/17/2018    INR 0.96 07/06/2018       Procedures:  PULMONARY FUNCTION TESTS:   No flowsheet data found.      ECHOCARDIOGRAM:   2017  Interpretation Summary  Left ventricular function, chamber size, wall motion, and wall thickness are  normal.The EF is 60-65%.  Normal right ventricular size and systolic function.  No significant valve disease.    Assessment and Plan:  1.  Autonomic dysfunction with POTS-like syndrome-has reasonable resting heart rates but apparently fluctuating heart rates during the day.  Ambulatory monitoring will be initiated to try and better define the symptoms.  2.  No new cardiovascular complaints    Plan  1.  Reintroduce long-acting propranolol 3 times weekly  2.  Continue ivabradine 7.5 twice daily  3.  Ambulatory ECG monitor for 1 week to assess heart rate issue  4.  Follow-up clinic visit 1 year although we will contact her earlier with regard to #3    Total elapsed time today with chart review, clinic visit and documentation 40 minutes    I very much appreciated the opportunity to see and assess Ada Welch in the clinic today. Please do not hesitate to contact my office if you have any questions or concerns.      Kirk Russell MD  Cardiac Arrhythmia Service  [facility]  [phone]      CC  KIRK RUSSELL

## 2022-07-19 NOTE — LETTER
7/19/2022      RE: Ada Welch  [address] [facility] [address]       Dear Colleague,    Thank you for the opportunity to participate in the care of your patient, Ada Welch, at the [facility]. Please see a copy of my visit note below.    HPI: \  Ms. Scar Welch is a 51 year-old female who presents  for follow up of autonomic dysfuction.  The patient has a past medical history significant for dysautonomia and Tom-Danlos.      Ada is currently  nad been taking propranolol  qday as well as ivabradine 7.5 mg twice daily.  However the propranolol has been held.    In terms of symptoms Seema has done relatively well but still has problems with autonomic fluctuations with high heart rates recorded on her iPhone.  We do not have ECGs and that would be helpful in trying to assess the severity and burden of her high heart rates.  We will obtain a bio tell recording over the next week or so to address this issue.    In terms of medications we will restart propranolol 120 extended release 3 times weekly and continue her ivabradine.  We will contact her after the cardiac monitor is completed.  In terms of follow-up clinic visit we will initially schedule I year and adapt as needed.    ECG today reveals a heart rate of 63/min and normal QRS and QT with normal QRS frontal axis.  ECG is within normal limits.    PAST MEDICAL HISTORY:  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     Cervicalgia      Chronic sinusitis 00/2007    Diagnosed with chronic pansinusitis 2016     Coagulation disorder (H)     factor 8      CSF leak     Chiari malformation     Depression      Tom-Danlos disease      Eosinophilic esophagitis 08/2018     Gastroesophageal reflux disease 3/1/2017    I have large hiatal hernia     Hiatal hernia 2016    Have adeline hiatel hernia     Hoarseness Unsure    It comes and goes     Hypertension      Hypothyroid      IBS (irritable bowel syndrome) with constipation     improved on Linzess     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     Irregular heart beat      Migraines 1/1/2007    Unsure of exact date     Nasal polyps 2013    Were revoved 03/2017, benign     Orthostatic hypotension      Other nervous system complications 1/2014    Autonomic nervous system disorder, neuralgia, neuropathy     Swelling, mass, or lump in head and neck 08/2016    Lymph node has been growing for last year     Thyroid disease 01/01/2008     Urinary incontinence      Urinary urgency        CURRENT MEDICATIONS:  Current Outpatient Medications   Medication Sig Dispense Refill     acetaminophen (TYLENOL) 500 MG tablet Take 1,000 mg by mouth every 6 hours as needed for mild pain       buprenorphine (BUTRANS) 10 MCG/HR WK patch Place 1 patch onto the skin every 7 days 4 patch 1     cetirizine (ZYRTEC) 10 MG tablet Take 10 mg by mouth daily as needed        co-enzyme Q-10 100 MG CAPS capsule Take 100 mg by mouth daily       DULoxetine (CYMBALTA) 30 MG capsule Take 60mg in the morning and 30mg at bedtime 270 capsule 1     EPINEPHrine (EPIPEN/ADRENACLICK/OR ANY BX GENERIC EQUIV) 0.3 MG/0.3ML injection 2-pack INJECT 0.3 MLS (0.3 MG) INTO THE MUSCLE AS NEEDED FOR ANAPHYLAXIS  1     ferrous fumarate 65 mg, [place]. FE,-Vitamin C 125 mg (VITRON-C)  MG TABS tablet Take 1 tablet by mouth every other day 60 tablet 4     fluticasone (FLONASE) 50 MCG/ACT nasal spray Spray 2 sprays into both nostrils daily Call clinic to schedule follow up appointment. 48 mL 11     HYDROmorphone (DILAUDID) 2 MG tablet Take 2 tablets (4  mg) by mouth every 6 hours as needed for pain 60 tablet 0     ivabradine (CORLANOR) 7.5 MG tablet Take 1 tab (7.5mg)  In AM and half tab (3.75mg) in afternoon 135 tablet 3     Lidocaine (LIDOCARE) 4 % Patch Place 1-3 patches onto the skin every 24 hours To prevent lidocaine toxicity, patient should be patch free for 12 hrs daily. 20 patch 0     linaclotide (LINZESS) 145 MCG capsule Take 1 capsule (145 mcg) by mouth every morning (before breakfast) 30 capsule 11     medical cannabis (Patient's own supply.  Not a prescription) Take 1 Dose by mouth See Admin Instructions (This is NOT a prescription, and does not certify that the patient has a qualifying medical condition for medical cannabis.  The purpose of this order is  to document that the patient reports taking medical cannabis.)     Capsule formulation - uses as needed (currently out of this and not using as of January 28, 2020)       methocarbamol (ROBAXIN) 500 MG tablet Take 1 tablet (500 mg) by mouth 4 times daily as needed for muscle spasms 120 tablet 1     metoclopramide (REGLAN) 10 MG tablet Take 1 tablet (10 mg) by mouth 4 times daily as needed (headache) 40 tablet 3     Multiple Vitamins-Minerals (MULTIVITAMIN PO) Take 1 tablet by mouth daily        naloxone (NARCAN) 4 MG/0.1ML nasal spray Spray 1 spray (4 mg) into one nostril alternating nostrils once as needed for opioid reversal every 2-3 minutes until assistance arrives 0.2 mL 0     naproxen sodium (ANAPROX) 220 MG tablet Take 440-660 mg by mouth daily as needed for moderate pain       Probiotic Product (PROBIOTIC DAILY PO) Take 2 packets by mouth every morning        Rhubarb (ESTROVEN COMPLETE) 4 MG TABS Take 1 tablet by mouth       rizatriptan (MAXALT-MLT) 5 MG ODT TAKE 1-2 TABLETS (5-10 MG) BY MOUTH AT ONSET OF HEADACHE FOR MIGRAINE (MAX 30 MG IN 24 HOURS) 18 tablet 11     SUMAtriptan (IMITREX STATDOSE) 6 MG/0.5ML pen injector kit Inject 0.5 ml (6 mg) subcutaneously at onset of migraine, May repeat  in 1 hour , Max 12 mg/24 hrs 2 kit 11     tiZANidine (ZANAFLEX) 4 MG tablet Take 1 tablet (4 mg) by mouth 4 times daily as needed for muscle spasms 360 tablet 3     traMADol (ULTRAM) 50 MG tablet Take 1 tablet (50 mg) by mouth every 6 hours as needed for severe pain TAKE 1 TABLET BY MOUTH EVERY 6 HOURS AS NEEDED FOR SEVERE PAIN 60 tablet 1     vitamin B complex with vitamin C (STRESS TAB) tablet Take 1 tablet by mouth daily       VITAMIN D, CHOLECALCIFEROL, PO Take 2,000 Units by mouth daily         PAST SURGICAL HISTORY:  Past Surgical History:   Procedure Laterality Date     AS REPAIR OF NASAL SEPTUM  2009    repair broke nose     BACK SURGERY  12/09/2013  10/01/2014    Spinal fusion L4-S1, L5 moving 5/8ths in. Remove screws/rods     BIOPSY  01/01/2008 & 3/2017    mole biopsy, lymph node biopsy     COLONOSCOPY  3/2017    Colonoscopy and GI     ESOPHAGEAL IMPEDENCE FUNCTION TEST WITH 24 HOUR PH GREATER THAN 1 HOUR N/A 9/17/2019    Procedure: IMPEDANCE PH STUDY, ESOPHAGUS, 24 HOUR;  Surgeon: Raghavendra Grande MD;  Location:  GI     ESOPHAGOSCOPY, GASTROSCOPY, DUODENOSCOPY (EGD), COMBINED N/A 8/3/2018    Procedure: COMBINED ESOPHAGOSCOPY, GASTROSCOPY, DUODENOSCOPY (EGD), BIOPSY SINGLE OR MULTIPLE;;  Surgeon: Juan Woodson MD;  Location:  GI     ESOPHAGOSCOPY, GASTROSCOPY, DUODENOSCOPY (EGD), COMBINED N/A 10/22/2018    Procedure: COMBINED ESOPHAGOSCOPY, GASTROSCOPY, DUODENOSCOPY (EGD), BIOPSY SINGLE OR MULTIPLE;  Surgeon: Sadia Carolina MD;  Location:  GI     ESOPHAGOSCOPY, GASTROSCOPY, DUODENOSCOPY (EGD), COMBINED N/A 12/17/2018    Procedure: COMBINED ESOPHAGOSCOPY, GASTROSCOPY, DUODENOSCOPY (EGD);  Surgeon: Juan Woodson MD;  Location: [facility] GI     INJECT BLOCK MEDIAL BRANCH CERVICAL/THORACIC/LUMBAR Left 9/24/2020    Procedure: BLOCK, NERVE, FACET JOINT, MEDIAL BRANCH, DIAGNOSTIC;  Surgeon: Faiza Martinez MD;  Location: [facility]     INJECT BLOCK MEDIAL BRANCH CERVICAL/THORACIC/LUMBAR Bilateral 10/8/2020     Procedure: Bilateral cervical 3, cervical 4, and cervical 5 medial branch nerve block;  Surgeon: Faiza Martinez MD;  Location: [facility] OR     INJECT BLOCK MEDIAL BRANCH CERVICAL/THORACIC/LUMBAR Right 3/4/2021    Procedure: Cervical 3, cervical 4, cervical 5 medial branch nerve blocks to target C3-C4 and C4-C5 facet joint;  Surgeon: Faiza Martinez MD;  Location: [facility] OR     LAPAROSCOPIC HERNIORRHAPHY HIATAL N/A 2/10/2020    Procedure: LAPAROSCOPIC HIATAL HERNIA REPAIR, NISSEN FUNDOPLICATION, ESOPHOGASTRODUODENOSCOPY, PEG TUBE PLACEMENT.;  Surgeon: Alana Mack MD;  Location: [facility] OR     NASAL/SINUS POLYPECTOMY  2017    Polyps were benign     NOSE SURGERY  2007    deviated septum     TONSILLECTOMY & ADENOIDECTOMY  1981     TUBAL LIGATION  07/01/2011       ALLERGIES:     Allergies   Allergen Reactions     Baclofen Other (See Comments)     Loss of muscle tone. Muscle weakness.     Bee Venom Shortness Of Breath, Palpitations, Anaphylaxis and Difficulty breathing     Patient does carry Epi-Pen     Chicken-Derived Products (Egg) Nausea and Diarrhea     Compazine [Prochlorperazine]      Extreme jittery     Food      Milk     Gabapentin      Dizzy     Gluten Meal GI Disturbance     Wheat bran and wheat     Pregabalin      Seasonal Allergies      Dust, mold     Topiramate      Pt does not remember reaction       FAMILY HISTORY:  Family History   Problem Relation Age of Onset     Connective Tissue Disorder Mother         eds     Connective Tissue Disorder Sister         eds     Cancer Father         Spinal tumor grew into spinal cord, went in brain     Migraines Father      Diabetes Maternal Grandmother         Elderly diabetes     Heart Disease Maternal Grandmother      Hypertension Maternal Grandmother      Diabetes Paternal Grandmother         Elderly diabetes     Diabetes Paternal Grandfather         Elderly diabetes     Asthma Sister         Pediatric Asthma     Migraines Sister      Asthma Son         Pediatric Asthma      Thyroid Disease Sister         ,     Migraines Sister      Migraines Sister      Breast Cancer No family hx of      SOCIAL HISTORY:  Social History     Tobacco Use     Smoking status: Former Smoker     Packs/day: 0.20     Years: 10.00     Pack years: 2.00     Types: Cigarettes     Start date: 1991     Quit date: 2013     Years since quittin.6     Smokeless tobacco: Never Used   Substance Use Topics     Alcohol use: Yes     Comment: once a month     Drug use: Yes     Types: Marijuana     Comment: medical marijuana       ROS:   Constitutional: No fever, chills, or sweats. Weight stable.   ENT: No visual disturbance, ear ache, epistaxis, sore throat.   Cardiovascular: As per HPI.   Respiratory: No cough, hemoptysis.    GI: No nausea, vomiting, .   : No hematuria.   Integument: Negative.   Psychiatric: Negative.   Hematologic:   no easy bleeding.  Neuro: Negative.   Endocrinology: No significant heat or cold intolerance   Musculoskeletal: No myalgia.    Exam:  LMP  (LMP Unknown)   GENERAL APPEARANCE: healthy, alert and no distress  HEENT: no icterus, , no central cyanosis  NECK: no adenopathy,JVP not elevated  RESPIRATORY:no rales, rhonchi or wheezes, no use of accessory muscles, no retractions, respirations are unlabored, normal respiratory rate  CARDIOVASCULAR: regular rhythm,ABDOMEN: soft, non tender, without hepatosplenomegaly, no masses palpable, bowel sounds normal, aorta not enlarged by palpation, no abdominal bruits  EXTREMITIES: peripheral pulses normal, no edema, no bruits  NEURO: alert and oriented to person/place/time, normal speech, gait and affect  SKIN: no ecchymoses, no rashes    Labs:  CBC RESULTS:   Lab Results   Component Value Date    WBC 6.5 2022    WBC 6.2 2020    RBC 4.74 2022    RBC 4.42 2020    HGB 14.8 2022    HGB 12.1 2020    HCT 43.5 2022    HCT 38.3 2020    MCV 92 2022    MCV 87 2020    MCH 31.2 2022    MCH  27.4 11/13/2020    MCHC 34.0 06/24/2022    MCHC 31.6 11/13/2020    RDW 12.1 06/24/2022    RDW 14.6 11/13/2020     06/24/2022     11/13/2020       BMP RESULTS:  Lab Results   Component Value Date     11/13/2020    POTASSIUM 4.5 11/13/2020    CHLORIDE 102 11/13/2020    CO2 29 11/13/2020    ANIONGAP 4 11/13/2020    GLC 96 11/13/2020    BUN 10 11/13/2020    CR 0.83 11/13/2020    GFRESTIMATED 83 11/13/2020    GFRESTBLACK >90 11/13/2020    JUAN 8.6 11/13/2020        INR RESULTS:  Lab Results   Component Value Date    INR 0.93 01/30/2020    INR 0.97 02/28/2019    INR 0.90 12/17/2018    INR 0.96 07/06/2018       Procedures:  PULMONARY FUNCTION TESTS:   No flowsheet data found.      ECHOCARDIOGRAM:   2017  Interpretation Summary  Left ventricular function, chamber size, wall motion, and wall thickness are  normal.The EF is 60-65%.  Normal right ventricular size and systolic function.  No significant valve disease.    Assessment and Plan:  1.  Autonomic dysfunction with POTS-like syndrome-has reasonable resting heart rates but apparently fluctuating heart rates during the day.  Ambulatory monitoring will be initiated to try and better define the symptoms.  2.  No new cardiovascular complaints    Plan  1.  Reintroduce long-acting propranolol 3 times weekly  2.  Continue ivabradine 7.5 twice daily  3.  Ambulatory ECG monitor for 1 week to assess heart rate issue  4.  Follow-up clinic visit 1 year although we will contact her earlier with regard to #3    Total elapsed time today with chart review, clinic visit and documentation 40 minutes    I very much appreciated the opportunity to see and assess Ada Welch in the clinic today. Please do not hesitate to contact my office if you have any questions or concerns.      Kirk Russell MD  Cardiac Arrhythmia Service  [facility]  [phone]      CC  KIRK RUSSELL        Please do not hesitate to contact me if you have any  questions/concerns.     Sincerely,     Kirk Russell MD

## 2022-07-19 NOTE — PROGRESS NOTES
Per Dr. Russell, patient to have 7-day Event monitor placed.  Diagnosis: Postural orthostatic tachycardia syndrome [I49.8]  Monitor placed: Yes  Patient Instructed: Yes  Patient verbalized understanding: Yes  Holter # [id number]    Monitor was placed by LINDA Barrios   11:39 AM

## 2022-07-19 NOTE — NURSING NOTE
Chief Complaint   Patient presents with     Follow Up     1 yr f/u for POTS   Ivabradine 7.5/3.75 and Propranolol 120- not sure if pt is still taking propranolol       Vitals were taken, medications reconciled, and EKG was performed.    Poonam Alas EMT  10:43 AM

## 2022-07-20 ENCOUNTER — MEDICAL CORRESPONDENCE (OUTPATIENT)
Dept: HEALTH INFORMATION MANAGEMENT | Facility: CLINIC | Age: 52
End: 2022-07-20

## 2022-07-20 ENCOUNTER — DOCUMENTATION ONLY (OUTPATIENT)
Dept: GASTROENTEROLOGY | Facility: CLINIC | Age: 52
End: 2022-07-20

## 2022-07-20 LAB
ATRIAL RATE - MUSE: 63 BPM
DIASTOLIC BLOOD PRESSURE - MUSE: NORMAL MMHG
INTERPRETATION ECG - MUSE: NORMAL
P AXIS - MUSE: -2 DEGREES
PR INTERVAL - MUSE: 146 MS
QRS DURATION - MUSE: 84 MS
QT - MUSE: 446 MS
QTC - MUSE: 456 MS
R AXIS - MUSE: 63 DEGREES
SYSTOLIC BLOOD PRESSURE - MUSE: NORMAL MMHG
T AXIS - MUSE: 47 DEGREES
VENTRICULAR RATE- MUSE: 63 BPM

## 2022-07-20 NOTE — PROGRESS NOTES
[facility] order, demographics, last office note was faxed to the [facility] at [phone]. Order scanned into chart. Notified patient that their order was faxed to [facility].    Karen Real LPN

## 2022-07-21 ENCOUNTER — OFFICE VISIT (OUTPATIENT)
Dept: SURGERY | Facility: CLINIC | Age: 52
End: 2022-07-21
Attending: STUDENT IN AN ORGANIZED HEALTH CARE EDUCATION/TRAINING PROGRAM
Payer: COMMERCIAL

## 2022-07-21 ENCOUNTER — ANCILLARY PROCEDURE (OUTPATIENT)
Dept: MRI IMAGING | Facility: CLINIC | Age: 52
End: 2022-07-21
Attending: ANESTHESIOLOGY
Payer: COMMERCIAL

## 2022-07-21 ENCOUNTER — OFFICE VISIT (OUTPATIENT)
Dept: INTERNAL MEDICINE | Facility: CLINIC | Age: 52
End: 2022-07-21
Payer: COMMERCIAL

## 2022-07-21 VITALS
TEMPERATURE: 98.4 F | DIASTOLIC BLOOD PRESSURE: 90 MMHG | BODY MASS INDEX: 27.88 KG/M2 | RESPIRATION RATE: 16 BRPM | SYSTOLIC BLOOD PRESSURE: 137 MMHG | OXYGEN SATURATION: 97 % | WEIGHT: 178 LBS | HEART RATE: 75 BPM

## 2022-07-21 VITALS
HEART RATE: 75 BPM | SYSTOLIC BLOOD PRESSURE: 137 MMHG | HEIGHT: 67 IN | RESPIRATION RATE: 16 BRPM | WEIGHT: 178.2 LBS | DIASTOLIC BLOOD PRESSURE: 90 MMHG | OXYGEN SATURATION: 97 % | BODY MASS INDEX: 27.97 KG/M2

## 2022-07-21 DIAGNOSIS — K44.9 HIATAL HERNIA: Primary | ICD-10-CM

## 2022-07-21 DIAGNOSIS — F51.04 PSYCHOPHYSIOLOGICAL INSOMNIA: Primary | ICD-10-CM

## 2022-07-21 DIAGNOSIS — L60.3 NAIL DYSTROPHY: ICD-10-CM

## 2022-07-21 DIAGNOSIS — M96.1 FAILED BACK SURGICAL SYNDROME: ICD-10-CM

## 2022-07-21 PROCEDURE — 99214 OFFICE O/P EST MOD 30 MIN: CPT | Performed by: STUDENT IN AN ORGANIZED HEALTH CARE EDUCATION/TRAINING PROGRAM

## 2022-07-21 PROCEDURE — 99214 OFFICE O/P EST MOD 30 MIN: CPT | Performed by: INTERNAL MEDICINE

## 2022-07-21 PROCEDURE — 87101 SKIN FUNGI CULTURE: CPT | Mod: 90 | Performed by: PATHOLOGY

## 2022-07-21 PROCEDURE — 72148 MRI LUMBAR SPINE W/O DYE: CPT | Mod: GC | Performed by: STUDENT IN AN ORGANIZED HEALTH CARE EDUCATION/TRAINING PROGRAM

## 2022-07-21 PROCEDURE — 99000 SPECIMEN HANDLING OFFICE-LAB: CPT | Performed by: PATHOLOGY

## 2022-07-21 PROCEDURE — G0463 HOSPITAL OUTPT CLINIC VISIT: HCPCS

## 2022-07-21 RX ORDER — HYDROMORPHONE HYDROCHLORIDE 2 MG/1
4 TABLET ORAL EVERY 6 HOURS PRN
Qty: 27 TABLET | Refills: 0 | Status: SHIPPED | OUTPATIENT
Start: 2022-07-21 | End: 2023-05-01

## 2022-07-21 RX ORDER — DOXEPIN 3 MG/1
3 TABLET, FILM COATED ORAL
Qty: 30 TABLET | Refills: 1 | Status: SHIPPED | OUTPATIENT
Start: 2022-07-21 | End: 2022-10-17

## 2022-07-21 ASSESSMENT — PAIN SCALES - GENERAL: PAINLEVEL: EXTREME PAIN (8)

## 2022-07-21 NOTE — PROGRESS NOTES
History of Present Illness:  Ms. Welch is a 51 year old female who presents for  Chief Complaint   Patient presents with     Follow Up     Four month follow-up--she reports no new health concerns today.      Pain       In interim, got botox in head and bilat traps. This caused more cervical symptoms, pain in scapula, neuropathic pain in bilat arms.  They rec MRI of c spine and brain--which showed stable degenerative changes C5/6/7.  She is also having  Lumbar MRI today.  Got extra dilaudid for pain flare.   Botox helped HA.    She was also referred to pelvic floor center for prolapse surgery. She is hesitant to have surgery.    Finds eating a healthier diet helps digestive system.    Saw Dr. Russell. Resumed propranolol for HR. Currently wearing heart monitor.  Scheduled appt with Dr. Barnett for dysautonomia--temperature instability, sweating, twitching.    She wasn't able to fill doxepin. Still having sleeping troubles.    She has concerns about finger and toe nail thickening.     Detailed Medical History:  -EDS-Type 3 Hypermobility. 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 He reports having a Beighton score of 7-8 out of 9. She reports having genetic testing done, which was questionable for the MYLK Gene. She previously followed with a rheumatologist who is an EDS specialist in [place].  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 Russell, has ILR. Ivadrabine and propronolol helping.  -Cervical instability, chiari malformation:  Saw Dr. Moncada, cervical fusion not recommended, considering occipital blocks. Also traveled to [facility] to see Dr. Andrey Sofia.  -Chronic headaches: Daily tension, cervicogenic, migraine, intracranial hypotension. Has not responded well to injections. Hospitalized in 2017 with normal LP and MRI. At that time responded to DHA.  Had blood patch done 3/19 with good relief of pain. Previously failed amovig and emgality. Blood patch test repeated 9/19. Now  managing with medical management, Uses fioricet,maxalt, aleve, tizanidine.  -Hiatal hernia: Recently saw Dr. Mack and had manometry testing done in Feb 2019 which revealed lower esoph pressure, hiatal hernia, possibly short esophagus, peristalsis was preserved. S/p Nissen surgery 2/20.  -Eosinophilic esophagitis: EGD 12/18 performed for food impaction showed ring at GEJ and mucosal changes c/w EoE. Biopsies 8/18 positive for Eos.  -Lumbar pain: She underwent L4 to S1 fusion in 2013 followed by removal of instrumentation, which did not improve her symptoms. She met with Dr. Martinez in the pain clinic and was taken off high doses of Dilaudid and started on Suboxone in 2017, which dramatically improved her symptoms.  She continues to work with physical therapy twice a week as well as other complementary therapies.   -Cervical pain, no surgical targets per neurosurgery  -IBS, constipation: GES showed rapid emptying 10/17. XR video swallow showed no aspiration 10/17.  -Asthma  -Enlarged cervical lymph nodes: L level 2 node biopsied 10/18 with scant cellularity. Biopsied in past and benign with neg flow cytometry.  -Urinary urge incontinence/incomplete emptying  -Pelvic floor dysfunction, rectocele          -Chronic Pain: Has regimen of medical cannabis, tramadol,  butran, cymbalta and tizanidine. Uses medical cannabis  since 2018 which helps. Follows with Dr. Martinez.       Routine Health Maintenence:  Depression Screening:   PHQ-2 Score:     PHQ-2 ( 1999 Pfizer) 7/14/2022 7/12/2022   Q1: Little interest or pleasure in doing things 0 0   Q2: Feeling down, depressed or hopeless 0 0   PHQ-2 Score 0 0   PHQ-2 Total Score (12-17 Years)- Positive if 3 or more points; Administer PHQ-A if positive - -   Q1: Little interest or pleasure in doing things - Not at all   Q2: Feeling down, depressed or hopeless - Not at all   PHQ-2 Score - 0        Immunizations (zoster, pneumovax, flu, Tdap, Hep A/B):   There is no immunization history on  file for this patient.  Lipids:   Recent Labs   Lab Test 04/26/18  1010   CHOL 244*   HDL 48*   *   TRIG 156*       Lung Ca Screening (>30 pk age 55-79 or >20 py age 50-79 + RF): n/a low pack years  Colonoscopy (50-75 yrs): 3/17  , due 2027   - One small hyperplastic polyp in the sigmoid colon, removed with a cold snare. Resected and retrieved. Diverticulosis in the sigmoid colon. Repeat colonoscopy in10 years for surveillance   Dexa (>65W or 70M yrs): n/a  Mammogram (40-75 yrs): 1/18 ordered, 6/22  Pap (21-65 yrs): 1/18 HPV neg, due 1/23  HIV/HCV if risk factors:  HIV neg 4/18  Tob/EtOH: screen neg  Lifestyle factors: reviewed  Advanced Directive: deferred      Review of external notes as documented above -Neuro, Cards, Pain, GI                  A detailed Review of Systems was performed, verified and is negative except as documented in the HPI.  All health questionnaires were reviewed, verified and relevant information documented above.      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     Cervicalgia      Chronic sinusitis 00/2007    Diagnosed with chronic pansinusitis 2016     Coagulation disorder (H)     factor 8     CSF leak     Chiari malformation     Depression      Tom-Danlos disease      Eosinophilic esophagitis 08/2018     Gastroesophageal reflux disease 3/1/2017    I have large hiatal hernia     Hiatal hernia 2016    Have adeline hiatel hernia     Hoarseness Unsure    It comes and goes     Hypertension      Hypothyroid      IBS (irritable bowel syndrome) with constipation     improved on Linzess     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      Irregular heart beat      Migraines 1/1/2007    Unsure of exact date     Nasal polyps 2013    Were revoved 03/2017, benign     Orthostatic hypotension      Other nervous system complications 1/2014    Autonomic nervous system disorder, neuralgia, neuropathy     Swelling, mass, or lump in head and neck 08/2016    Lymph node has been growing for last year     Thyroid disease 01/01/2008     Urinary incontinence      Urinary urgency        Active Meds:  Current Outpatient Medications   Medication     acetaminophen (TYLENOL) 500 MG tablet     buprenorphine (BUTRANS) 10 MCG/HR WK patch     cetirizine (ZYRTEC) 10 MG tablet     co-enzyme Q-10 100 MG CAPS capsule     doxepin (SILENOR) 3 MG tablet     DULoxetine (CYMBALTA) 30 MG capsule     EPINEPHrine (EPIPEN/ADRENACLICK/OR ANY BX GENERIC EQUIV) 0.3 MG/0.3ML injection 2-pack     ferrous fumarate 65 mg, [place]. FE,-Vitamin C 125 mg (VITRON-C)  MG TABS tablet     fluticasone (FLONASE) 50 MCG/ACT nasal spray     HYDROmorphone (DILAUDID) 2 MG tablet     ivabradine (CORLANOR) 7.5 MG tablet     Lidocaine (LIDOCARE) 4 % Patch     linaclotide (LINZESS) 145 MCG capsule     medical cannabis (Patient's own supply.  Not a prescription)     methocarbamol (ROBAXIN) 500 MG tablet     metoclopramide (REGLAN) 10 MG tablet     Multiple Vitamins-Minerals (MULTIVITAMIN PO)     naloxone (NARCAN) 4 MG/0.1ML nasal spray     naproxen sodium (ANAPROX) 220 MG tablet     Probiotic Product (PROBIOTIC DAILY PO)     Rhubarb (ESTROVEN COMPLETE) 4 MG TABS     rizatriptan (MAXALT-MLT) 5 MG ODT     SUMAtriptan (IMITREX STATDOSE) 6 MG/0.5ML pen injector kit     tiZANidine (ZANAFLEX) 4 MG tablet     traMADol (ULTRAM) 50 MG tablet     vitamin B complex with vitamin C (STRESS TAB) tablet     VITAMIN D, CHOLECALCIFEROL, PO     Current Facility-Administered Medications   Medication     Botulinum Toxin Type A (BOTOX) 200 units injection 155 Units        Allergies:  Reviewed, refer to EMR    Relevant Social  "History:  Social History     Tobacco Use     Smoking status: Former Smoker     Packs/day: 0.20     Years: 10.00     Pack years: 2.00     Types: Cigarettes     Start date: 1991     Quit date: 2013     Years since quittin.6     Smokeless tobacco: Never Used   Substance Use Topics     Alcohol use: Yes     Comment: once a month     Drug use: Yes     Types: Marijuana     Comment: medical marijuana        Physical Exam:  Vitals: BP (!) 137/90 (BP Location: Right arm, Patient Position: Sitting, Cuff Size: Adult Regular)   Pulse 75   Resp 16   Ht 1.702 m (5' 7\")   Wt 80.8 kg (178 lb 3.2 oz)   LMP  (LMP Unknown)   SpO2 97%   Breastfeeding No   BMI 27.91 kg/m    Constitutional: Alert, oriented, pleasant, no acute distress  Head: Normocephalic, atraumatic  Eyes: Extra-ocular movements intact, pupils equally round bilaterally, no scleral icterus  ENT: Masked  Neck: Supple, no lymphadenopathy  Cardiovascular: Regular rate and rhythm, wearing monitor, no murmurs, rubs or gallops, peripheral pulses full/symmetric  Respiratory: Good air movement bilaterally, lungs clear, no wheezes/rales/rhonchi  Musculoskeletal: No edema, normal muscle tone, normal gait  Neurologic: Alert and oriented, cranial nerves 2-12 intact, grossly non-focal  Skin: thickened nails with debris underneath on fingers  Psychiatric: normal mentation, affect and mood      Diagnostics:  Labs reviewed in Epic          Assessment and Plan:  Ada was seen today for follow up and pain.    Diagnoses and all orders for this visit:    Psychophysiological insomnia  -     doxepin (SILENOR) 3 MG tablet; Take 1 tablet (3 mg) by mouth nightly as needed for sleep    Nail dystrophy  -     Fungus skin hair nail culture; Future  -     Fungus skin hair nail culture      Pain, DIAL, Pelvic Floor issues  Managed per specialists        Ellyn Rivera MD  Internal Medicine              "

## 2022-07-21 NOTE — PROGRESS NOTES
THORACIC SURGERY FOLLOW UP VISIT    Dear Dr. Rivera,  I saw Ms. Ada Welch in follow-up today. The clinical summary follows:     PREOP DIAGNOSIS   Hiatal Hernia, Type III  PROCEDURE   Laparoscopic hiatal hernia repair, Nissen fundoplication, and PEG tube placement  DATE OF PROCEDURE  2/10/20  COMPLICATIONS  None  INTERVAL STUDIES  Upper GI (4/21/22)  1. Changes of hernia repair and fundoplication with tiny hiatal herniation of the gastric wrap. The GE junction is positioned normally. No gastroesophageal reflux identified. 2. Mild esophageal dysmotility.   ETOH - occasional  TOB - former smoker off/on for 20+ years, quit 2015  BMI 27.88    SUBJECTIVE  Ada returns today with some concern for some persistent epigastric discomfort, related to specific foods. Although this has not affected her QOL, she has made many dietary changes to help with this, avoiding heavy proteins and raw vegetables. She does not have reflux or regurgitation. She has some upper esophageal dysphagia which she relates to EoE, which has been ongoing for several years. No recent endoscopy. No weight loss.     From a personal perspective, she is here alone. She is very active with her 5 children and grandchildren.     IMPRESSION No diagnosis found.  51 year-old female s/p laparoscopic hiatal hernia repair with Nissen fundoplication  Epigastric pain  Dysphagia    PLAN  I spent a total of 40 minutes with Ms. Ada Vanessa, more than 50% of which were spent in counseling, coordination of care, and face-to-face time. I reviewed the plan as follows:  We disucssed that the imaging was not very impressive for a recurrence. Her symptoms are non-specific. We will investigate further to definw the problem. However, I do not think she needs a re-operation and she is very happy to hear this.  Will obtain CT chest/abdomen for further evaluation of possible recurrent hiatal hernia. This will serve as a diagnostic tool, as well as a baseline should  she develop worsened symptoms in the future.  Referral to GI for EGD with possible dilation if her Nissen appears somewhat tight. Additionally will need esophageal biopsies given history of EoE.   All questions were answered and the patient and present family were in agreement with the plan.  I appreciate the opportunity to participate in the care of your patient and will keep you updated.  Sincerely,

## 2022-07-21 NOTE — LETTER
7/21/2022         RE: Ada Welch  [address] [address]        Dear Colleague,    Thank you for referring your patient, Ada Welch, to the [facility]. Please see a copy of my visit note below.    THORACIC SURGERY FOLLOW UP VISIT    Dear Dr. Rivera,  I saw Ms. Ada Welch in follow-up today. The clinical summary follows:    PREOP DIAGNOSIS   Hiatal Hernia, Type III  PROCEDURE   Laparoscopic hiatal hernia repair, Nissen fundoplication, and PEG tube placement  DATE OF PROCEDURE  2/10/20  COMPLICATIONS  None  INTERVAL STUDIES  Upper GI (4/21/22)  1. Changes of hernia repair and fundoplication with tiny hiatal herniation of the gastric wrap. The GE junction is positioned normally. No gastroesophageal reflux identified. 2. Mild esophageal dysmotility.   ETOH - occasional  TOB - former smoker off/on for 20+ years, quit 2015  BMI 27.88    SUBJECTIVE  Ada returns today with some concern for some persistent epigastric discomfort, related to specific foods. Although this has not affected her QOL, she has made many dietary changes to help with this, avoiding heavy proteins and raw vegetables. She does not have reflux or regurgitation. She has some upper esophageal dysphagia which she relates to EoE, which has been ongoing for several years. No recent endoscopy. No weight loss.     From a personal perspective, she is here alone. She is very active with her 5 children and grandchildren.     IMPRESSION No diagnosis found.  51 year-old female s/p laparoscopic hiatal hernia repair with Nissen fundoplication  Epigastric pain  Dysphagia    PLAN  I spent a total of 40 minutes with Ms. Ada Vanessa, more than 50% of which were spent in counseling, coordination of care, and face-to-face time. I reviewed the plan as follows:  We disucssed that the imaging was not very impressive for a recurrence. Her symptoms are non-specific. We will investigate further to definw the  problem. However, I do not think she needs a re-operation and she is very happy to hear this.  Will obtain CT chest/abdomen for further evaluation of possible recurrent hiatal hernia. This will serve as a diagnostic tool, as well as a baseline should she develop worsened symptoms in the future.  Referral to GI for EGD with possible dilation if her Nissen appears somewhat tight. Additionally will need esophageal biopsies given history of EoE.   All questions were answered and the patient and present family were in agreement with the plan.  I appreciate the opportunity to participate in the care of your patient and will keep you updated.    Again, thank you for allowing me to participate in the care of your patient.      Sincerely,    Alana Mack MD

## 2022-07-21 NOTE — NURSING NOTE
"Ada Welch is a 51 year old female patient that presents today in clinic for the following:    Chief Complaint   Patient presents with     Follow Up     Four month follow-up--she reports no new health concerns today.      Pain     The patient's allergies and medications were reviewed as noted. A set of vitals were recorded as noted without incident: BP (!) 137/90 (BP Location: Right arm, Patient Position: Sitting, Cuff Size: Adult Regular)   Pulse 75   Resp 16   Ht 1.702 m (5' 7\")   Wt 80.8 kg (178 lb 3.2 oz)   LMP  (LMP Unknown)   SpO2 97%   Breastfeeding No   BMI 27.91 kg/m  . The patient does not have any other questions for the provider.    LINDA Rajan at 10:01 AM on 7/21/2022  "

## 2022-07-21 NOTE — NURSING NOTE
"Oncology Rooming Note    July 21, 2022 11:09 AM   Ada Welch is a 51 year old female who presents for:    Chief Complaint   Patient presents with     Oncology Clinic Visit     Hiatal hernia       Initial Vitals: BP (!) 137/90   Pulse 75   Temp 98.4  F (36.9  C) (Oral)   Resp 16   Wt 80.7 kg (178 lb)   LMP  (LMP Unknown)   SpO2 97%   BMI 27.88 kg/m   Estimated body mass index is 27.88 kg/m  as calculated from the following:    Height as of an earlier encounter on 7/21/22: 1.702 m (5' 7\").    Weight as of this encounter: 80.7 kg (178 lb). Body surface area is 1.95 meters squared.  Extreme Pain (8) Comment: Data Unavailable   No LMP recorded (lmp unknown). Patient is postmenopausal.  Allergies reviewed: Yes  Medications reviewed: Yes    Medications: Medication refills not needed today.  Pharmacy name entered into Newser:    [facility] - [place], [place] - [address]  [id number] - [place] - [address]    Clinical concerns:        Ginny Lopez CMA              "

## 2022-07-22 ENCOUNTER — MYC MEDICAL ADVICE (OUTPATIENT)
Dept: INTERNAL MEDICINE | Facility: CLINIC | Age: 52
End: 2022-07-22

## 2022-07-22 ENCOUNTER — MYC MEDICAL ADVICE (OUTPATIENT)
Dept: CARDIOLOGY | Facility: CLINIC | Age: 52
End: 2022-07-22

## 2022-07-22 ENCOUNTER — TELEPHONE (OUTPATIENT)
Dept: INTERNAL MEDICINE | Facility: CLINIC | Age: 52
End: 2022-07-22

## 2022-07-22 DIAGNOSIS — F51.04 PSYCHOPHYSIOLOGICAL INSOMNIA: Primary | ICD-10-CM

## 2022-07-24 ENCOUNTER — TELEPHONE (OUTPATIENT)
Dept: INTERNAL MEDICINE | Facility: CLINIC | Age: 52
End: 2022-07-24

## 2022-07-24 DIAGNOSIS — G47.01 INSOMNIA DUE TO MEDICAL CONDITION: Primary | ICD-10-CM

## 2022-07-25 DIAGNOSIS — K20.0 EOSINOPHILIC ESOPHAGITIS: ICD-10-CM

## 2022-07-25 DIAGNOSIS — K44.9 HIATAL HERNIA: Primary | ICD-10-CM

## 2022-07-25 DIAGNOSIS — K59.02 CONSTIPATION DUE TO OUTLET DYSFUNCTION: Primary | ICD-10-CM

## 2022-07-25 RX ORDER — TRAZODONE HYDROCHLORIDE 50 MG/1
50 TABLET, FILM COATED ORAL AT BEDTIME
Status: CANCELLED | OUTPATIENT
Start: 2022-07-25

## 2022-07-25 RX ORDER — LUBIPROSTONE 8 UG/1
16 CAPSULE ORAL 2 TIMES DAILY
Qty: 120 CAPSULE | Refills: 1 | Status: SHIPPED | OUTPATIENT
Start: 2022-07-25 | End: 2022-10-17

## 2022-07-25 RX ORDER — TRAZODONE HYDROCHLORIDE 150 MG/1
150 TABLET ORAL AT BEDTIME
Status: CANCELLED | OUTPATIENT
Start: 2022-07-25

## 2022-07-25 RX ORDER — TRAZODONE HYDROCHLORIDE 100 MG/1
100 TABLET ORAL AT BEDTIME
Status: CANCELLED | OUTPATIENT
Start: 2022-07-25

## 2022-07-25 NOTE — TELEPHONE ENCOUNTER
Morgan Gross,   Unfortunately, trazodone is relatively contraindicated given a potential interaction with duloxetine.  She could try OTC unisom.  Ellyn Rivera MD  Internal Medicine

## 2022-07-26 ENCOUNTER — TELEPHONE (OUTPATIENT)
Dept: GASTROENTEROLOGY | Facility: CLINIC | Age: 52
End: 2022-07-26

## 2022-07-26 NOTE — TELEPHONE ENCOUNTER
Screening Questions    BlueKIND OF PREP RedLOCATION [review exclusion criteria] GreenSEDATION TYPE    Have you had a positive covid test in the last 90 days? N  If yes, what date?     Do you have a legal guardian or medical Power of ?  Are you able to give consent for your medical care?Y (Sedation review/consideration needed)    1. Are you active on mychart? Y    2. What insurance is in the chart? MEDICA MEDICARE    3.   Ordering/Referring Provider: ZAYRA CHRISTENSEN    4. BMI 27.9 [BMI OVER 40-EXTENDED PREP]  If greater than 40 review exclusion criteria [PAC APPT IF (MAC) @ [facility]]      5.  Respiratory Screening:  [If yes to any of the following HOSPITAL setting only]     Do you use daily home oxygen? N    Do you have mod to severe Obstructive Sleep Apnea? N  [OKAY @ [facility] SH PH RI]   Do you have Pulmonary Hypertension? N     Do you have UNCONTROLLED asthma? N        6.   Have you had a heart or lung transplant? N      7.   Are you currently on dialysis? N [ If yes, G-PREP & HOSPITAL setting only]     8.   Do you have chronic kidney disease? N [ If yes, G-PREP ]    9.   Have you had a stroke or Transient ischemic attack (TIA - aka  mini stroke ) within 6 months?  N (If yes, please review exclusion criteria)         In the past 6 months, have you had any heart related issues including cardiomyopathy or heart attack? N           If yes, did it require cardiac stenting or other implantable device? N      10   Do you have any implantable devices in your body (pacemaker, defib, LVAD)? N (If yes, please review exclusion criteria)    11.   Do you take nitroglycerin? N            If yes, how often? N  (if yes, HOSPITAL setting ONLY)    12.   Are you currently taking any blood thinners? N           [IF YES, INFORM PATIENT TO FOLLOW UP W/ ORDERING PROVIDER FOR BRIDGING INSTRUCTIONS]     13.  22.  Do you take the medication Phentermine?     Yes-> Hold for 7 days before procedure.  Please consult your  prescribing provider if you have questions about holding this medication.     No-> Continue to next question.    14.   Do you have a diagnosis of diabetes? N   [ If yes, G-PREP ]    15.   [FEMALES] Are you currently pregnant? N    If yes, how many weeks? N    16.   Are you taking any prescription pain medications on a routine schedule?  Y  [ If yes, EXTENDED PREP.] [If yes, MAC]    17.   Do you have any chemical dependencies such as alcohol, street drugs, or methadone?  N [If yes, MAC]    18.   Do you have any history of post-traumatic stress syndrome, severe anxiety or history of psychosis?  N  [If yes, MAC]    19.   Do you transfer independently? (If NO, please HOSPITAL setting  only)  Y    20.  On a regular basis do you go 3-5 days between bowel movements?    [ If yes, EXTENDED PREP.]    21.   Preferred LOCAL Pharmacy for Pre Prescription:      [facility]    Scheduling Details      Caller: Vilma Welch  (Please ask for phone number if not scheduled by patient)    Type of Procedure Scheduled: Upper Endoscopy [EGD]    Which Colonoscopy Prep was Sent?:   PATRICIA CF PATIENTS & GROEN'S PATIENTS NEEDS EXTENDED PREP    Surgeon: JESSE  Date of Procedure: 10/14  Location: [facility]    Sedation Type: MAC  Conscious Sedation- Needs  for 6 hours after the procedure  MAC/General-Needs  for 24 hours after procedure    Pre-op Required at [facility] [place], [place] and OR for MAC sedation: N  (advise patient they will need a pre-op WITH IN 30 DAYS prior to procedure -)      Informed patient they will need an adult  Y  Cannot take any type of public or medical transportation alone    Pre-Pro[facility] or Externally: HOME    Confirmed Nurse will call to complete assessment Y    Additional comments:

## 2022-08-01 DIAGNOSIS — M96.1 POSTLAMINECTOMY SYNDROME: ICD-10-CM

## 2022-08-02 ENCOUNTER — TELEPHONE (OUTPATIENT)
Dept: GASTROENTEROLOGY | Facility: CLINIC | Age: 52
End: 2022-08-02

## 2022-08-02 NOTE — TELEPHONE ENCOUNTER
Central Prior Authorization Team   Phone: [phone]    PA Initiation    Medication: lubiprostone (AMITIZA) 8 MCG capsule  Insurance Company: [employer] - Phone [phone] Fax [phone]  Ph[id number] [place] [place] [address]  Filling Pharmacy Phone: [phone]  Filling Pharmacy Fax: [phone]  Start Date: 8/2/2022

## 2022-08-02 NOTE — TELEPHONE ENCOUNTER
[facility] Call Center    Phone Message    May a detailed message be left on voicemail: yes     Reason for Call: Other: Pharmacy called stating this RX needs Prior Auth and they have been faxing over the paperwork; however, faxing to the wrong number.  Therefore, they will refax to [phone].     Action Taken: Message routed to:  [facility]    Travel Screening: Not Applicable

## 2022-08-02 NOTE — PROGRESS NOTES
Called the Pelvic Floor Clinic to check if patient was schedule for their workup. Patient is not scheduled yet. Patient was called and mailed letter.     Karen Real LPN

## 2022-08-02 NOTE — TELEPHONE ENCOUNTER
PRIOR AUTHORIZATION DENIED    Medication: lubiprostone (AMITIZA) 8 MCG capsule-PA DENIED     Denial Date: 8/2/2022    Denial Rational:         Appeal Information:

## 2022-08-02 NOTE — TELEPHONE ENCOUNTER
Prior Authorization Specialty Medication Request    Medication/Dose: Amitza: Take 2 capsules (16 mcg) by mouth 2 times daily - Oral  ICD code (if different than what is on RX):    Previously Tried and Failed:      Important Lab Values:   Rationale: Constipation, Rectal intussusception, Pelvic floor dysfunction, Tom-Danlos syndrome    Insurance Name:   Insurance ID:   Insurance Phone Number:     Pharmacy Information (if different than what is on RX)  Name:    Phone:

## 2022-08-03 RX ORDER — TRAMADOL HYDROCHLORIDE 50 MG/1
TABLET ORAL
Qty: 60 TABLET | Refills: 1 | Status: SHIPPED | OUTPATIENT
Start: 2022-08-03 | End: 2022-08-05

## 2022-08-05 ENCOUNTER — TELEPHONE (OUTPATIENT)
Dept: ANESTHESIOLOGY | Facility: CLINIC | Age: 52
End: 2022-08-05

## 2022-08-05 DIAGNOSIS — M96.1 LUMBAR POST-LAMINECTOMY SYNDROME: ICD-10-CM

## 2022-08-05 DIAGNOSIS — M96.1 POSTLAMINECTOMY SYNDROME: ICD-10-CM

## 2022-08-05 RX ORDER — DULOXETIN HYDROCHLORIDE 30 MG/1
CAPSULE, DELAYED RELEASE ORAL
Qty: 270 CAPSULE | Refills: 1 | Status: SHIPPED | OUTPATIENT
Start: 2022-08-05 | End: 2023-02-07

## 2022-08-05 RX ORDER — TRAMADOL HYDROCHLORIDE 50 MG/1
TABLET ORAL
Qty: 60 TABLET | Refills: 1 | Status: SHIPPED | OUTPATIENT
Start: 2022-08-05 | End: 2022-09-22

## 2022-08-05 NOTE — TELEPHONE ENCOUNTER
Prescriptions were sent to the wrong pharmacy. All prescriptions at the [facility] in [place] have been cancelled.   The pharmacy was removed from their profile.     Prescriptions pended and routed to Dr. Martinez to review.     Provider updated of situation.     LPN will respond to pt via MyChart.     Mya Cash LPN

## 2022-08-05 NOTE — TELEPHONE ENCOUNTER
[facility] Call Center    Phone Message    May a detailed message be left on voicemail: yes     Reason for Call: Medication Refill Request    Has the patient contacted the pharmacy for the refill? Yes   Name of medication being requested: traMADol (ULTRAM) 50 MG tablet  Provider who prescribed the medication: Faiza Martinez  Pharmacy: [facility] in [place]    Pharmacy said that this prescription was sent to the [facility] in [place] by mistake and it needs to be send to the [facility] in [place].  They can't transfer the prescription because it's a control substance.  Pt doesn't want her medications sent to [place].

## 2022-08-05 NOTE — TELEPHONE ENCOUNTER
[facility] Call Center    Phone Message    May a detailed message be left on voicemail: yes     Reason for Call: Medication Refill Request    Has the patient contacted the pharmacy for the refill? Yes   Name of medication being requested: traMADol (ULTRAM) 50 MG tablet and DULoxetine (CYMBALTA) 30 MG capsule  Provider who prescribed the medication: Faiza Martinez MD  Pharmacy: [id number] - [place], [place] - [address].   Date medication is needed: ASAP rx was sent to [facility] in [place] however it needs to be sent to the [facility] in [place], the [place] pharmacy needs to be removed from her chart.      Also Rx for DULoxetine needs to be increased to 90 day supply not 10 days     Action Taken: Message routed to:  [facility] ([facility]IN [facility]INIC     Travel Screening: Not Applicable

## 2022-08-10 DIAGNOSIS — K59.02 CONSTIPATION DUE TO OUTLET DYSFUNCTION: ICD-10-CM

## 2022-08-10 DIAGNOSIS — K59.03 DRUG INDUCED CONSTIPATION: Primary | ICD-10-CM

## 2022-08-16 DIAGNOSIS — M96.1 POSTLAMINECTOMY SYNDROME: ICD-10-CM

## 2022-08-17 ENCOUNTER — MYC MEDICAL ADVICE (OUTPATIENT)
Dept: ANESTHESIOLOGY | Facility: CLINIC | Age: 52
End: 2022-08-17

## 2022-08-18 LAB — BACTERIA SPEC CULT: NO GROWTH

## 2022-08-18 NOTE — TELEPHONE ENCOUNTER
Refill request    Medication: buprenorphine (BUTRANS) 10 MCG/HR WK patch      Last clinic appointment: 7/14/22  Next clinic appointment: 10/13/22    Last Drug Screen Collected: 8/5/21  Controlled Substance Agreement signed: 8/5/21      Preferred pharmacy:    [facility] [address]    Refill request routed to the provider to review.     Mya Cash LPN

## 2022-08-19 DIAGNOSIS — M96.1 POSTLAMINECTOMY SYNDROME: ICD-10-CM

## 2022-08-19 RX ORDER — BUPRENORPHINE 10 UG/H
1 PATCH TRANSDERMAL
Qty: 4 PATCH | Refills: 1 | OUTPATIENT
Start: 2022-08-19

## 2022-08-19 RX ORDER — BUPRENORPHINE 10 UG/H
1 PATCH TRANSDERMAL
Qty: 4 PATCH | Refills: 1 | Status: SHIPPED | OUTPATIENT
Start: 2022-08-19 | End: 2022-10-13

## 2022-08-19 NOTE — TELEPHONE ENCOUNTER
Refill request     Medication: buprenorphine (BUTRANS) 10 MCG/HR WK patch        Last clinic appointment: 7/14/22  Next clinic appointment: 10/13/22     Last Drug Screen Collected: 8/5/21  Controlled Substance Agreement signed: 8/5/21        Preferred pharmacy:     [facility] [address]     Medication reviewed and refilled by Dr. Donna Campbell.      Mya Cash LPN

## 2022-08-19 NOTE — TELEPHONE ENCOUNTER
Pended refill contained pharmacy error.   Please see other refill encounter.   Medication was refilled by covering provider- Dr. Donna Campbell.     Mya Cash LPN

## 2022-08-23 ENCOUNTER — OFFICE VISIT (OUTPATIENT)
Dept: NEUROLOGY | Facility: CLINIC | Age: 52
End: 2022-08-23
Payer: COMMERCIAL

## 2022-08-23 ENCOUNTER — ANCILLARY PROCEDURE (OUTPATIENT)
Dept: CT IMAGING | Facility: CLINIC | Age: 52
End: 2022-08-23
Attending: CLINICAL NURSE SPECIALIST
Payer: COMMERCIAL

## 2022-08-23 DIAGNOSIS — G43.719 INTRACTABLE CHRONIC MIGRAINE WITHOUT AURA AND WITHOUT STATUS MIGRAINOSUS: Primary | ICD-10-CM

## 2022-08-23 DIAGNOSIS — K44.9 HIATAL HERNIA: ICD-10-CM

## 2022-08-23 PROCEDURE — 64615 CHEMODENERV MUSC MIGRAINE: CPT | Performed by: PSYCHIATRY & NEUROLOGY

## 2022-08-23 PROCEDURE — 74150 CT ABDOMEN W/O CONTRAST: CPT | Mod: GC | Performed by: RADIOLOGY

## 2022-08-23 PROCEDURE — 71250 CT THORAX DX C-: CPT | Mod: GC | Performed by: RADIOLOGY

## 2022-08-23 NOTE — LETTER
8/23/2022       RE: Ada Welch  [address] [address]     Dear Colleague,    Thank you for referring your patient, Ada Welch, to the [facility] [place] [facility]. Please see a copy of my visit note below.          Physicians     Ada Welch MRN# [id number]   Age: 51 year old YOB: 1970      Botulinum Toxin Procedure Note     The procedure was explained to the patient. Benefits of the treatment were discussed including headache and migraine reduction. Risks of the procedure were reviewed including but not limited to pain, bruising, bleeding, infection, weakness of muscles injected or those distal to injection. The patient voiced understanding of the risks and benefits. All questions answered and the patient consented to proceed.      Prior to receiving Botox injections the patient reported the following:  Baseline headache frequency: > 15 days/month  Baseline headache duration: > 4 hours  Associated migrainous features: wavy vision     Previous treatment trials: amitriptyline, zonnisamide, gabapentin, Aimovig, Emgality, duloxetine, tizanidine     Last Botox procedure: 5/31/2022  Current migraine frequency: see above  Current migraine duration: see above     A 200 unit vial of Botox was diluted with 4ml of 0.9% sodium chloride     Botox lot # and expiration date: See MAR for details  0.9% sodium chloride lot # and expiration date: See MAR for details     The following muscles were injected using a 30 gauge needle:  Procerus 1 site 5 units   2 sites (bilat) 10 units  Frontalis 4 sites (bilat) 20 units  Temporalis 8 sites (bilat) 40 units  Trapezius muscles 6 sites (bilat) 30 units  Occipitalis 6 sites (bilat) 40 units  Cervical paraspinals not injected due to hypermobility  Masseter 2 sites (bilat) 10 units     A total of 155 units were injected, 45 units wasted.   The patient  tolerated the injections well. I was present for and performed the entire procedure.        Noemy Nguyen MD

## 2022-08-23 NOTE — PROGRESS NOTES
BRYCE Bowdengeovanna Welch MRN# [id number]   Age: 51 year old YOB: 1970      Botulinum Toxin Procedure Note     The procedure was explained to the patient. Benefits of the treatment were discussed including headache and migraine reduction. Risks of the procedure were reviewed including but not limited to pain, bruising, bleeding, infection, weakness of muscles injected or those distal to injection. The patient voiced understanding of the risks and benefits. All questions answered and the patient consented to proceed.      Prior to receiving Botox injections the patient reported the following:  Baseline headache frequency: > 15 days/month  Baseline headache duration: > 4 hours  Associated migrainous features: wavy vision     Previous treatment trials: amitriptyline, zonnisamide, gabapentin, Aimovig, Emgality, duloxetine, tizanidine     Last Botox procedure: 5/31/2022  Current migraine frequency: see above  Current migraine duration: see above     A 200 unit vial of Botox was diluted with 4ml of 0.9% sodium chloride     Botox lot # and expiration date: See MAR for details  0.9% sodium chloride lot # and expiration date: See MAR for details     The following muscles were injected using a 30 gauge needle:  Procerus 1 site 5 units   2 sites (bilat) 10 units  Frontalis 4 sites (bilat) 20 units  Temporalis 8 sites (bilat) 40 units  Trapezius muscles 6 sites (bilat) 30 units  Occipitalis 6 sites (bilat) 40 units  Cervical paraspinals not injected due to hypermobility  Masseter 2 sites (bilat) 10 units     A total of 155 units were injected, 45 units wasted.   The patient tolerated the injections well. I was present for and performed the entire procedure.      Noemy Nguyen MD

## 2022-08-29 ENCOUNTER — MYC MEDICAL ADVICE (OUTPATIENT)
Dept: CARDIOLOGY | Facility: CLINIC | Age: 52
End: 2022-08-29

## 2022-08-29 DIAGNOSIS — G90.A POSTURAL ORTHOSTATIC TACHYCARDIA SYNDROME: Primary | ICD-10-CM

## 2022-08-29 DIAGNOSIS — G90.9 AUTONOMIC DYSFUNCTION: ICD-10-CM

## 2022-08-29 DIAGNOSIS — R00.0 SINUS TACHYCARDIA: ICD-10-CM

## 2022-09-02 RX ORDER — IVABRADINE 7.5 MG/1
7.5 TABLET, FILM COATED ORAL 2 TIMES DAILY
Qty: 180 TABLET | Refills: 3 | Status: SHIPPED | OUTPATIENT
Start: 2022-09-02

## 2022-09-02 RX ORDER — PROPRANOLOL HYDROCHLORIDE 120 MG/1
120 CAPSULE, EXTENDED RELEASE ORAL DAILY
Start: 2022-09-02 | End: 2022-10-04

## 2022-09-02 NOTE — TELEPHONE ENCOUNTER
Sierra Tristan, Cheyenne Miller CNP RN  Hello,   OK with the increase of Ivabradine to 7.5 mg BID.   Thanks,   LVW     RX for Ivabradine 7.5mg BID ordered and faxed to [facility] in [place].  Cheyenne Coyne, RN on 9/2/2022 at 10:02 AM

## 2022-09-20 DIAGNOSIS — M54.50 LOW BACK PAIN: Primary | ICD-10-CM

## 2022-09-20 ASSESSMENT — ENCOUNTER SYMPTOMS
FLANK PAIN: 1
VOMITING: 0
DIARRHEA: 1
HYPOTENSION: 1
NECK MASS: 1
JOINT SWELLING: 1
TINGLING: 1
NECK PAIN: 1
WHEEZING: 0
CHILLS: 1
DIFFICULTY URINATING: 1
SMELL DISTURBANCE: 0
BOWEL INCONTINENCE: 0
TASTE DISTURBANCE: 0
DOUBLE VISION: 1
WEAKNESS: 1
HEADACHES: 1
POLYPHAGIA: 0
LOSS OF CONSCIOUSNESS: 1
JAUNDICE: 0
COUGH DISTURBING SLEEP: 0
HEMOPTYSIS: 0
SYNCOPE: 0
EYE REDNESS: 1
SLEEP DISTURBANCES DUE TO BREATHING: 1
PALPITATIONS: 1
SKIN CHANGES: 0
ABDOMINAL PAIN: 1
PARALYSIS: 0
BACK PAIN: 1
HYPERTENSION: 0
TREMORS: 1
BLOOD IN STOOL: 0
MUSCLE CRAMPS: 1
RECTAL PAIN: 0
SHORTNESS OF BREATH: 1
EYE WATERING: 1
DEPRESSION: 0
TROUBLE SWALLOWING: 1
SWOLLEN GLANDS: 1
NAIL CHANGES: 1
DYSURIA: 1
PANIC: 0
BLOATING: 1
WEIGHT LOSS: 1
SPEECH CHANGE: 0
CONSTIPATION: 0
DECREASED APPETITE: 1
NAUSEA: 1
EXERCISE INTOLERANCE: 1
HOARSE VOICE: 1
NIGHT SWEATS: 1
HALLUCINATIONS: 0
SORE THROAT: 0
INCREASED ENERGY: 1
DYSPNEA ON EXERTION: 1
HEMATURIA: 0
FEVER: 0
NERVOUS/ANXIOUS: 0
HOT FLASHES: 0
LIGHT-HEADEDNESS: 1
MEMORY LOSS: 1
DISTURBANCES IN COORDINATION: 1
INSOMNIA: 1
DECREASED LIBIDO: 1
ALTERED TEMPERATURE REGULATION: 1
BRUISES/BLEEDS EASILY: 1
COUGH: 0
WEIGHT GAIN: 0
POOR WOUND HEALING: 1
SPUTUM PRODUCTION: 0
HEARTBURN: 0
ORTHOPNEA: 1
STIFFNESS: 1
MYALGIAS: 1
DIZZINESS: 1
EYE IRRITATION: 1
NUMBNESS: 1
SNORES LOUDLY: 1
EYE PAIN: 1
FATIGUE: 1
SINUS CONGESTION: 1
POSTURAL DYSPNEA: 1
ARTHRALGIAS: 1
POLYDIPSIA: 1
SEIZURES: 0
LEG PAIN: 1
DECREASED CONCENTRATION: 1
MUSCLE WEAKNESS: 1
SINUS PAIN: 1

## 2022-09-21 ENCOUNTER — MYC MEDICAL ADVICE (OUTPATIENT)
Dept: ANESTHESIOLOGY | Facility: CLINIC | Age: 52
End: 2022-09-21

## 2022-09-21 DIAGNOSIS — M96.1 POSTLAMINECTOMY SYNDROME: ICD-10-CM

## 2022-09-21 DIAGNOSIS — M96.1 POST LAMINECTOMY SYNDROME: ICD-10-CM

## 2022-09-21 NOTE — TELEPHONE ENCOUNTER
DIAGNOSIS: Former patient of Dr. Enciso (2016) now presents with lumbar spondylosis above the fusion, history of Tom-Danlos syndrome and chronic continuous use of opioids (Butrans patch, prn  tramadol   APPOINTMENT DATE: 10.4.22   NOTES STATUS DETAILS   OFFICE NOTE from other specialist Internal/Care Everywhere 7.14.22 Dr Faiza Martinez, [facility] Anes  1.21.22 Anamaria Pop, [facility] Anes  More in Epic/Care Everywhere    9.1.16 Dr Darrell Enciso, [facility] Ortho   OPERATIVE REPORT Internal/Care Everywhere Nerve block: 3.4.21, 10.8.20, 9.24.20    10.8.14 FUSION ANTERIOR POSTERIOR SPINE LEVEL 02    12.9.13 L4-S1 OPEN TRANSFORAMINAL LUMBAR INTERBODY FUSION      MEDICATION LIST Internal    EMG (for Spine) Internal      LABS     CBC/DIFF Internal    MRI PACS 7.21.22 lumbar spine  11.2.19 lumbar spine, thoracic spine, cervical spine  4.20.18 lumbar spine   9.14.16 lumbar spine  7.12.16 lumbar spine, Pope  1.20.16 lumbar spine, Pope  7.8.14 lumbar spine, Pope  6.18.14 lumbar spine, Rayus  5.8.14 lumbar spine. IDI  11.17.13 lumbar spine, Pope  5.10.13 lumbar spine, IDI   CT SCAN PACS 4.26.18 lumbar spine  2.18.15 lumbar spine, [facility]  7.10.14 lumbar spine, Pope   XRAYS (IMAGES & REPORTS) PACS 8.5.21 lumbar  4.26.18 complete spine  3.7.18 lumbar spine  11.30.17 lumbar spine, cervical spine   10.15.16 lumbar spine, [facility]  1.21.16 lumbar spine, Pope  10.13.15 lumbar spine, Pope  5.2.14 lumbar spine, Pope  1.21.14 lumbar spine, Pope

## 2022-09-22 RX ORDER — AMITRIPTYLINE HYDROCHLORIDE 10 MG/1
10 TABLET ORAL AT BEDTIME
Qty: 30 TABLET | Refills: 1 | Status: SHIPPED | OUTPATIENT
Start: 2022-09-22 | End: 2022-10-13

## 2022-09-22 RX ORDER — TRAMADOL HYDROCHLORIDE 50 MG/1
TABLET ORAL
Qty: 60 TABLET | Refills: 1 | Status: SHIPPED | OUTPATIENT
Start: 2022-10-01 | End: 2022-10-13

## 2022-09-22 NOTE — TELEPHONE ENCOUNTER
Pt responded stating they want to discontinue Methocarbamol and restart Amitriptyline and Tizanidine.     Amitriptyline was previously prescribed by Dr. Martinez- but the pt's PCP discontinued on 3/10/22.     LPN will route request to Dr. Martinez to review the following medications-       tiZANidine (ZANAFLEX) 4 MG tablet- Take 1 tablet (4 mg) by mouth 4 times daily as needed for muscle spasms     amitriptyline (ELAVIL) 10 MG tablet- Take 1 tablet (10 mg) by mouth At Bedtime     Mya Cash LPN

## 2022-09-22 NOTE — TELEPHONE ENCOUNTER
Dr. Martinez replied and he is agreeable to pt restarting medications.     Medications ordered, and Methocarbamol was discontinued.     Mya Cash LPN

## 2022-09-22 NOTE — TELEPHONE ENCOUNTER
Refill request    Medication: Tramadol 50 mg  Sig: TAKE 1 TABLET BY MOUTH EVERY 6 HOURS AS NEEDED FOR SEVERE PAIN  Dispensed: 60  Refills: 1  Date of last fill: 8/8/22, 9/1/22    Last clinic appointment: 7/14/22  Next clinic appointment: 10/13/22    Last Drug Screen Collected: 8/5/21  Controlled Substance Agreement signed: 8/5/21      Preferred pharmacy:    [facility] on [address] in [place], [place]

## 2022-09-22 NOTE — TELEPHONE ENCOUNTER
LPN reviewed chart.   Tramadol last refilled 9/1/22. Medication pended and routed to Dr. Martinez to review and advise.     LPN sent message to pt via [employer] to clarify other medication requests.     It appears pt wants to discontinue Methocarbamol and switch back to Tizanidine.     Pt also requested refill for Amitriptyline- medication was discontinued by PCP on 3/10/22.     LPN will wait to hear back from pt, and then will follow up with Dr. Martinez.     Mya Cash LPN

## 2022-10-04 ENCOUNTER — ANCILLARY PROCEDURE (OUTPATIENT)
Dept: GENERAL RADIOLOGY | Facility: CLINIC | Age: 52
End: 2022-10-04
Attending: ORTHOPAEDIC SURGERY
Payer: COMMERCIAL

## 2022-10-04 ENCOUNTER — TRANSFERRED RECORDS (OUTPATIENT)
Dept: HEALTH INFORMATION MANAGEMENT | Facility: CLINIC | Age: 52
End: 2022-10-04

## 2022-10-04 ENCOUNTER — PRE VISIT (OUTPATIENT)
Dept: ORTHOPEDICS | Facility: CLINIC | Age: 52
End: 2022-10-04

## 2022-10-04 ENCOUNTER — OFFICE VISIT (OUTPATIENT)
Dept: ORTHOPEDICS | Facility: CLINIC | Age: 52
End: 2022-10-04
Attending: ANESTHESIOLOGY
Payer: COMMERCIAL

## 2022-10-04 DIAGNOSIS — M54.50 LOW BACK PAIN: ICD-10-CM

## 2022-10-04 DIAGNOSIS — M96.1 FAILED BACK SURGICAL SYNDROME: ICD-10-CM

## 2022-10-04 PROCEDURE — 99214 OFFICE O/P EST MOD 30 MIN: CPT | Performed by: PHYSICIAN ASSISTANT

## 2022-10-04 PROCEDURE — 72082 X-RAY EXAM ENTIRE SPI 2/3 VW: CPT | Performed by: SURGERY

## 2022-10-04 NOTE — LETTER
10/4/2022         RE: Ada Welch  [address] [facility] [address]        Dear Colleague,    Thank you for referring your patient, Ada Welch, to the [facility] [place]. Please see a copy of my visit note below.    Spine surgery hx:  12/09/2013 - L4-S1 fusion, TLIF (Dr. Williamson in [place], [place])   2014 - removal of posterior instrumentation (screws and guadalupe) with exploration of fusion (Dr. MIKE Lucero ([facility]))    REASON FOR CONSULTATION: Consult (Low back pain )     REFERRING PHYSICIAN: Faiza Martinez   PCP:Ellyn Rivera    History of Present Illness:    51 year old female who presents today for evaluation of chronic low back pain. Previously seen by Dr. Enciso 9/5/2016. At that time, she was very debilitated by pain. On chronic pain meds: Fentanyl 25 mcg every 3 days, Dilaudid 4 mg, 4 tabs/day.  These are managed by [facility] (Anesthesiologist) in [place], [place]. Previous SUNSHINE's not helpful, and even made things worse    Today, patient returns to clinic at recommendation of Dr. Martinez.  Dr. Martinez recommended she come here to discuss other nonsurgical options versus surgical options.  He thinks that she will need lumbar surgery at some point in the future.  Pain is localized to midline low back with radiation into lateral hips and down lateral thigh and calf.  Pain is worse if she is standing upright for too long, and worsens throughout the day.  She feels herself becoming progressively tighter in her muscles.  She also notes that she has bilateral piriformis syndrome and pain in both buttocks that radiates down the back of her thighs.  Denies new onset weakness in the legs.  Denies issues of bowel/bladder incontinence.  She has not had any epidural steroid injections done because she says she has bad reaction to these.  She has been getting massages which helps significantly with muscle spasms.  She has also had Botox injections in the trapezius muscles which has helped with muscle  "spasm pain in this region.  She has also tried physical therapy, but this usually causes her pain to flareup.  She has not had any SI joint injections that she can recall.  She has also not had any MBB/RFA in the lumbar spine.  She has not had Botox injections in the piriformis muscles.    Patient is also currently seeing Dr. Moncada with neurosurgery for her neck pain.  Neck pain is localized to midline posterior neck with radiation into upper trapezius and around into the chest area.  Also radiates down midline thoracic spine.  When she is upright for too long, she will get buzzing sensation into bilateral hands, worse on the medial border of the hand into the pinky.  Associated with decreased  strength and feeling like she is clumsy tying knots.  This dizziness and  strength that she has been going on for years, slowly worsening over the years.  Dr. Moncada is hesitant to proceed with surgery.    Back 70%, Leg 30%,  Right = Left  Worse: sweeping, vauuming, carrying things, standing up too long and sitting too long. Sitting is as painful as standing.  Better: laying down and walking    Previous treatment:   - Physical therapy: currently enrolled through [facility], can cause pain to flare   - injections: botox in trapezius = helpful. prior SUNSHINE causes \"lava pain in spine\".  - Medications:   Tizanidine- 4 mg, take up to 4 times daily  Amitriptyline 10 mg, daily at bedtime.   Butrans patch 10mcg per hour  Tramadol 50mg q6hr PRN      Social history:  Ambulatory status at baseline: walks without assistive devices  Lives at home with family    Oswestry (JOSEFA) Questionnaire    OSWESTRY DISABILITY INDEX 9/20/2022   Count 10   Sum 31   Oswestry Score (%) 62      JOSEFA 9/2/16 74%  JOSEFA 9/20/22 62%      Neck Disability Index (NDI) Questionnaire    Neck Disability Index (NDI) 4/26/2018   Neck Disability Index: Count 10   NDI: Total Score = SUM (points for all 10 findings) 38   Neck Disability in Percent = (Total Score) / 50 * 100 " 76 (%)        PROMIS-10 Scores  Global Mental Health Score: (P) 15  Global Physical Health Score: (P) 8  PROMIS TOTAL - SUBSCORES: (P) 23    ROS:  A 12-point review of systems was completed and is negative except for otherwise noted above in the history of present illness.    Med Hx:  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     Cervicalgia      Chronic sinusitis 00/2007    Diagnosed with chronic pansinusitis 2016     Coagulation disorder (H)     factor 8     CSF leak     Chiari malformation     Depression      Tom-Danlos disease      Eosinophilic esophagitis 08/2018     Gastroesophageal reflux disease 3/1/2017    I have large hiatal hernia     Hiatal hernia 2016    Have adeline hiatel hernia     Hoarseness Unsure    It comes and goes     Hypertension      Hypothyroid      IBS (irritable bowel syndrome) with constipation     improved on Linzess     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     Irregular heart beat      Migraines 1/1/2007    Unsure of exact date     Nasal polyps 2013    Were revoved 03/2017, benign     Orthostatic hypotension      Other nervous system complications 1/2014    Autonomic nervous system disorder, neuralgia, neuropathy     Swelling, mass, or lump in head and neck 08/2016    Lymph node has been growing for last year     Thyroid disease 01/01/2008     Urinary incontinence      Urinary urgency        Surg Hx:  Past Surgical History:   Procedure Laterality Date     AS REPAIR OF NASAL SEPTUM  2009    repair broke nose     BACK SURGERY  12/09/2013  10/01/2014    Spinal fusion L4-S1, L5 moving 5/8ths in. Remove screws/rods     BIOPSY  01/01/2008 & 3/2017    mole  biopsy, lymph node biopsy     COLONOSCOPY  3/2017    Colonoscopy and GI     ESOPHAGEAL IMPEDENCE FUNCTION TEST WITH 24 HOUR PH GREATER THAN 1 HOUR N/A 9/17/2019    Procedure: IMPEDANCE PH STUDY, ESOPHAGUS, 24 HOUR;  Surgeon: Raghavendra Grande MD;  [facility]     ESOPHAGOSCOPY, GASTROSCOPY, DUODENOSCOPY (EGD), COMBINED N/A 8/3/2018    Procedure: COMBINED ESOPHAGOSCOPY, GASTROSCOPY, DUODENOSCOPY (EGD), BIOPSY SINGLE OR MULTIPLE;;  Surgeon: Juan Woodson MD;  [facility]     ESOPHAGOSCOPY, GASTROSCOPY, DUODENOSCOPY (EGD), COMBINED N/A 10/22/2018    Procedure: COMBINED ESOPHAGOSCOPY, GASTROSCOPY, DUODENOSCOPY (EGD), BIOPSY SINGLE OR MULTIPLE;  Surgeon: Sadia Carolina MD;  Location:  GI     ESOPHAGOSCOPY, GASTROSCOPY, DUODENOSCOPY (EGD), COMBINED N/A 12/17/2018    Procedure: COMBINED ESOPHAGOSCOPY, GASTROSCOPY, DUODENOSCOPY (EGD);  Surgeon: Juan Woodson MD;  [facility]     INJECT BLOCK MEDIAL BRANCH CERVICAL/THORACIC/LUMBAR Left 9/24/2020    Procedure: BLOCK, NERVE, FACET JOINT, MEDIAL BRANCH, DIAGNOSTIC;  Surgeon: Faiza Martinez MD;  [facility]     INJECT BLOCK MEDIAL BRANCH CERVICAL/THORACIC/LUMBAR Bilateral 10/8/2020    Procedure: Bilateral cervical 3, cervical 4, and cervical 5 medial branch nerve block;  Surgeon: Faiza Martinez MD;  Location: [facility]     INJECT BLOCK MEDIAL BRANCH CERVICAL/THORACIC/LUMBAR Right 3/4/2021    Procedure: Cervical 3, cervical 4, cervical 5 medial branch nerve blocks to target C3-C4 and C4-C5 facet joint;  Surgeon: Faiza Martinez MD;  Location: [facility]     LAPAROSCOPIC HERNIORRHAPHY HIATAL N/A 2/10/2020    Procedure: LAPAROSCOPIC HIATAL HERNIA REPAIR, NISSEN FUNDOPLICATION, ESOPHOGASTRODUODENOSCOPY, PEG TUBE PLACEMENT.;  Surgeon: Alana Mack MD;  [facility]     NASAL/SINUS POLYPECTOMY  2017    Polyps were benign     NOSE SURGERY  2007    deviated septum     TONSILLECTOMY & ADENOIDECTOMY  1981     TUBAL LIGATION  07/01/2011        Allergies:  Allergies   Allergen Reactions     Baclofen Other (See Comments)     Loss of muscle tone. Muscle weakness.     Bee Venom Shortness Of Breath, Palpitations, Anaphylaxis and Difficulty breathing     Patient does carry Epi-Pen     Chicken-Derived Products (Egg) Nausea and Diarrhea     Compazine [Prochlorperazine]      Extreme jittery     Food      Milk     Gabapentin      Dizzy     Gluten Meal GI Disturbance     Wheat bran and wheat     Pregabalin      Seasonal Allergies      Dust, mold     Topiramate      Pt does not remember reaction       Meds:  Current Outpatient Medications   Medication     acetaminophen (TYLENOL) 500 MG tablet     amitriptyline (ELAVIL) 10 MG tablet     buprenorphine (BUTRANS) 10 MCG/HR WK patch     cetirizine (ZYRTEC) 10 MG tablet     co-enzyme Q-10 100 MG CAPS capsule     doxepin (SILENOR) 3 MG tablet     DULoxetine (CYMBALTA) 30 MG capsule     EPINEPHrine (EPIPEN/ADRENACLICK/OR ANY BX GENERIC EQUIV) 0.3 MG/0.3ML injection 2-pack     ferrous fumarate 65 mg, [place]. FE,-Vitamin C 125 mg (VITRON-C)  MG TABS tablet     fluticasone (FLONASE) 50 MCG/ACT nasal spray     HYDROmorphone (DILAUDID) 2 MG tablet     ivabradine (CORLANOR) 7.5 MG tablet     Lidocaine (LIDOCARE) 4 % Patch     linaclotide (LINZESS) 145 MCG capsule     lubiprostone (AMITIZA) 8 MCG capsule     medical cannabis (Patient's own supply.  Not a prescription)     metoclopramide (REGLAN) 10 MG tablet     Multiple Vitamins-Minerals (MULTIVITAMIN PO)     naloxone (NARCAN) 4 MG/0.1ML nasal spray     naproxen sodium (ANAPROX) 220 MG tablet     Probiotic Product (PROBIOTIC DAILY PO)     propranolol ER (INDERAL LA) 120 MG 24 hr capsule     Rhubarb (ESTROVEN COMPLETE) 4 MG TABS     rizatriptan (MAXALT-MLT) 5 MG ODT     SUMAtriptan (IMITREX STATDOSE) 6 MG/0.5ML pen injector kit     Suvorexant (BELSOMRA) 5 MG tablet     tiZANidine (ZANAFLEX) 4 MG tablet     traMADol (ULTRAM) 50 MG tablet     vitamin B complex with vitamin  C (STRESS TAB) tablet     VITAMIN D, CHOLECALCIFEROL, PO     Current Facility-Administered Medications   Medication     Botulinum Toxin Type A (BOTOX) 200 units injection 155 Units       Fam Hx:  Family History   Problem Relation Age of Onset     Connective Tissue Disorder Mother         eds     Connective Tissue Disorder Sister         eds     Cancer Father         Spinal tumor grew into spinal cord, went in brain     Migraines Father      Diabetes Maternal Grandmother         Elderly diabetes     Heart Disease Maternal Grandmother      Hypertension Maternal Grandmother      Diabetes Paternal Grandmother         Elderly diabetes     Diabetes Paternal Grandfather         Elderly diabetes     Asthma Sister         Pediatric Asthma     Migraines Sister      Asthma Son         Pediatric Asthma     Thyroid Disease Sister         ,     Migraines Sister      Migraines Sister      Breast Cancer No family hx of        P/S Hx:  Social History     Tobacco Use     Smoking status: Former Smoker     Packs/day: 0.20     Years: 10.00     Pack years: 2.00     Types: Cigarettes     Start date: 1991     Quit date: 2013     Years since quittin.8     Smokeless tobacco: Never Used   Substance Use Topics     Alcohol use: Yes     Comment: once a month         Physical Exam:  Very pleasant, healthy appearing, alert, oriented x 3, cooperative.  Normal mood and affect.  Not in cardiorespiratory distress.  LMP  (LMP Unknown)   Normal upright posture.    Normal gait without assistive device.  No antalgia / imbalance.  Able to walk on toes and on heels with ease.  Back: no deformity, no skin lesions or surgical scars.  Localizes pain at midline lumbosacral junction  Tenderness: (+) midline, (-) paraspinal though QL are in spasm, (+) R and L PSIS. (+) bilateral piriformis (+) bilateral greater trochanters    Neuro Exam:  Motor:        LOWER EXTREMITY Left Right   Hip flexion 5/5 5/5   Knee flexion 5/5 5/5   Knee extension 5/5 5/5    Ankle dorsiflexion 5/5 5/5   Ankle plantarflexion 5/5 5/5   Great toe extension 4/5 4/5      Sensory:  Intact to light touch in both LE's.     Lower Extremity:  Equal leg lengths, full pulses, (-) atrophy / asymmetry.  Full painless passive knee and ankle motion.  + log roll. + hip ROM bilat --> both cause lateral pain over greater trochanters    Straight leg raise: (-) right, (-) left.  Hip impingement: (-) right, (-) left.  WINSTON/Dewey's: (-) right, (-) left.      Sacroiliac Joint Tests:    Right Left   Yanci Finger Test  - -   WINSTON  - -   Thigh Thrust: - -   Pelvic Compression Test  + -   Palpation  ++ ++   Pelvic Gapping  - -   Gaenslen s Test  + +   Sacral Thrust (SI) - -     Imaging:  10/4/2022 EOS full spine standing AP/lateral view x-rays: Neutral coronal alignment.  No significant degenerative changes to hips.  No significant leg length discrepancy.  Interbody fusion hardware present L4-S1.  Mild degeneration L3-4.  No evidence of spondylolisthesis.  Sagittal measurements:  LL 70, ideal 56.5  L4-S1 43, ideal 38  PI 57  PT 10  SVA -0.7cm  TPA 5  GT 7  T10-L2 lord 6    8/23/2022 CT abdomen pelvis without contrast: Solid fusion mass present 4-S1 without significant foraminal or canal stenosis.  Vacuum phenomenon bilateral SI joints.  Facet arthropathy L3-4.    7/21/2022 MRI lumbar spine without contrast: Mild spinal canal stenosis L3-4.  Facet arthropathy L3-4. No significant foraminal stenosis    Impression:   1. Chronic low back pain, mild adjacent segment disease L3-4. Exam findings consistent with piriformis syndrome bilateral, sacroiliitis bilateral, and greater trochanteric bursitis  2. Solid arthrodesis s/p TLIF L4-5,L5-S1 (12/2013), s/p removal of posterior instrumentation (2014).  3. Multiple medical co-morbidities, including Tom-Danlos type III, Fibromyalgia, DM, hypothryoidism    Plan:   Reviewed XR and MR imaging results with patient today. She has overall good sagittal and coronal  alignment. She is starting to develop degeneration at L3-4 level with mild stenosis and facet changes, but nothing to warrant the need for urgent surgery. We discussed that it would be best to optimize non-operative treatment options before resorting to surgical intervention. Surgery would involve extending fusion up to L3-4 level.   On exam, she is exhibiting findings of SI joint pain, piriformis syndrome, facet based pain, and greater trochanteric bursitis. Recommend seeing if injections & therapy could help with these symptoms. Dr. Enciso sent [employer] message to Dr. Martinez today about this. Recommend she follow up as needed. If pain worsens despite non-operative treatment options, then we would consider surgical intervention.    - continue with PT through [facility]  - continue with non-operative treatment options via Dr. Martinez. Sent [employer] message to Dr. Martinez today inquring whether she could get piriformis botox injections vs lumbar MBB/ RFA vs SI joint injections  - follow up PRN. Obtain lumbar flexion/extension XR    All questions and concerns were answered to the patient's apparent satisfaction before leaving the clinic.     Respectfully,  Cheyenne Vernon (curt Bolanos)SIMONE    Attestation:  I (Dr. Darrell Enciso - Spine Surgeon) have personally evaluated patient with SIMONE Vernon, and agree with findings and plan outlined in the note, which I also edited.  I discussed at length with the patient/family, explained the nature of spinal condition, and formulated workup and/or treatment plan together.  All questions were answered to the best of my ability and to patient's apparent satisfaction.    45 minutes spent on the date of the encounter doing chart review/review of outside records/review of test results/interpretation of tests/patient visit/documentation/discussion with other provider(s)/discussion with patient and family.    Darrell Enciso MD    Orthopaedic Spine Surgery  Dept  Orthopaedic Surgery, [facility]  [phone] office, [phone] pager  [email or web address]

## 2022-10-04 NOTE — PROGRESS NOTES
Spine surgery hx:  12/09/2013 - L4-S1 fusion, TLIF (Dr. Williamson in [place], [place])   2014 - removal of posterior instrumentation (screws and guadalupe) with exploration of fusion (Dr. MIKE Lucero ([facility]))    REASON FOR CONSULTATION: Consult (Low back pain )     REFERRING PHYSICIAN: Faiza Martinez   PCP:Ellyn Rivera    History of Present Illness:    51 year old female who presents today for evaluation of chronic low back pain. Previously seen by Dr. Enciso 9/5/2016. At that time, she was very debilitated by pain. On chronic pain meds: Fentanyl 25 mcg every 3 days, Dilaudid 4 mg, 4 tabs/day.  These are managed by [facility] (Anesthesiologist) in [place], [place]. Previous SUNSHINE's not helpful, and even made things worse    Today, patient returns to clinic at recommendation of Dr. Martinez.  Dr. Martinez recommended she come here to discuss other nonsurgical options versus surgical options.  He thinks that she will need lumbar surgery at some point in the future.  Pain is localized to midline low back with radiation into lateral hips and down lateral thigh and calf.  Pain is worse if she is standing upright for too long, and worsens throughout the day.  She feels herself becoming progressively tighter in her muscles.  She also notes that she has bilateral piriformis syndrome and pain in both buttocks that radiates down the back of her thighs.  Denies new onset weakness in the legs.  Denies issues of bowel/bladder incontinence.  She has not had any epidural steroid injections done because she says she has bad reaction to these.  She has been getting massages which helps significantly with muscle spasms.  She has also had Botox injections in the trapezius muscles which has helped with muscle spasm pain in this region.  She has also tried physical therapy, but this usually causes her pain to flareup.  She has not had any SI joint injections that she can recall.  She has also not had any MBB/RFA in the lumbar spine.  She has not had Botox injections in  "the piriformis muscles.    Patient is also currently seeing Dr. Moncada with neurosurgery for her neck pain.  Neck pain is localized to midline posterior neck with radiation into upper trapezius and around into the chest area.  Also radiates down midline thoracic spine.  When she is upright for too long, she will get buzzing sensation into bilateral hands, worse on the medial border of the hand into the pinky.  Associated with decreased  strength and feeling like she is clumsy tying knots.  This dizziness and  strength that she has been going on for years, slowly worsening over the years.  Dr. Moncada is hesitant to proceed with surgery.    Back 70%, Leg 30%,  Right = Left  Worse: sweeping, vauuming, carrying things, standing up too long and sitting too long. Sitting is as painful as standing.  Better: laying down and walking    Previous treatment:   - Physical therapy: currently enrolled through [facility], can cause pain to flare   - injections: botox in trapezius = helpful. prior SUNSHINE causes \"lava pain in spine\".  - Medications:   Tizanidine- 4 mg, take up to 4 times daily  Amitriptyline 10 mg, daily at bedtime.   Butrans patch 10mcg per hour  Tramadol 50mg q6hr PRN      Social history:  Ambulatory status at baseline: walks without assistive devices  Lives at home with family    Oswestry (JOSEFA) Questionnaire    OSWESTRY DISABILITY INDEX 9/20/2022   Count 10   Sum 31   Oswestry Score (%) 62      JOSEFA 9/2/16 74%  JOSEFA 9/20/22 62%      Neck Disability Index (NDI) Questionnaire    Neck Disability Index (NDI) 4/26/2018   Neck Disability Index: Count 10   NDI: Total Score = SUM (points for all 10 findings) 38   Neck Disability in Percent = (Total Score) / 50 * 100 76 (%)        PROMIS-10 Scores  Global Mental Health Score: (P) 15  Global Physical Health Score: (P) 8  PROMIS TOTAL - SUBSCORES: (P) 23    ROS:  A 12-point review of systems was completed and is negative except for otherwise noted above in the history of present " illness.    Med Hx:  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     Cervicalgia      Chronic sinusitis 00/2007    Diagnosed with chronic pansinusitis 2016     Coagulation disorder (H)     factor 8     CSF leak     Chiari malformation     Depression      Tom-Danlos disease      Eosinophilic esophagitis 08/2018     Gastroesophageal reflux disease 3/1/2017    I have large hiatal hernia     Hiatal hernia 2016    Have adeline hiatel hernia     Hoarseness Unsure    It comes and goes     Hypertension      Hypothyroid      IBS (irritable bowel syndrome) with constipation     improved on Linzess     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     Irregular heart beat      Migraines 1/1/2007    Unsure of exact date     Nasal polyps 2013    Were revoved 03/2017, benign     Orthostatic hypotension      Other nervous system complications 1/2014    Autonomic nervous system disorder, neuralgia, neuropathy     Swelling, mass, or lump in head and neck 08/2016    Lymph node has been growing for last year     Thyroid disease 01/01/2008     Urinary incontinence      Urinary urgency        Surg Hx:  Past Surgical History:   Procedure Laterality Date     AS REPAIR OF NASAL SEPTUM  2009    repair broke nose     BACK SURGERY  12/09/2013  10/01/2014    Spinal fusion L4-S1, L5 moving 5/8ths in. Remove screws/rods     BIOPSY  01/01/2008 & 3/2017    mole biopsy, lymph node biopsy     COLONOSCOPY  3/2017    Colonoscopy and GI     ESOPHAGEAL IMPEDENCE FUNCTION TEST WITH 24 HOUR PH GREATER THAN 1 HOUR N/A 9/17/2019    Procedure: IMPEDANCE PH STUDY, ESOPHAGUS, 24 HOUR;  Surgeon: Raghavendra Grande MD;  Location:  GI      ESOPHAGOSCOPY, GASTROSCOPY, DUODENOSCOPY (EGD), COMBINED N/A 8/3/2018    Procedure: COMBINED ESOPHAGOSCOPY, GASTROSCOPY, DUODENOSCOPY (EGD), BIOPSY SINGLE OR MULTIPLE;;  Surgeon: Juan Woodson MD;  Location: [facility] GI     ESOPHAGOSCOPY, GASTROSCOPY, DUODENOSCOPY (EGD), COMBINED N/A 10/22/2018    Procedure: COMBINED ESOPHAGOSCOPY, GASTROSCOPY, DUODENOSCOPY (EGD), BIOPSY SINGLE OR MULTIPLE;  Surgeon: Sadia Carolina MD;  Location: [facility] GI     ESOPHAGOSCOPY, GASTROSCOPY, DUODENOSCOPY (EGD), COMBINED N/A 12/17/2018    Procedure: COMBINED ESOPHAGOSCOPY, GASTROSCOPY, DUODENOSCOPY (EGD);  Surgeon: Juan Woodson MD;  Location: [facility] GI     INJECT BLOCK MEDIAL BRANCH CERVICAL/THORACIC/LUMBAR Left 9/24/2020    Procedure: BLOCK, NERVE, FACET JOINT, MEDIAL BRANCH, DIAGNOSTIC;  Surgeon: Faiza Martinez MD;  Location: [facility]     INJECT BLOCK MEDIAL BRANCH CERVICAL/THORACIC/LUMBAR Bilateral 10/8/2020    Procedure: Bilateral cervical 3, cervical 4, and cervical 5 medial branch nerve block;  Surgeon: Faiza Martinez MD;  Location: [facility] OR     INJECT BLOCK MEDIAL BRANCH CERVICAL/THORACIC/LUMBAR Right 3/4/2021    Procedure: Cervical 3, cervical 4, cervical 5 medial branch nerve blocks to target C3-C4 and C4-C5 facet joint;  Surgeon: Faiza Martinez MD;  Location: [facility] OR     LAPAROSCOPIC HERNIORRHAPHY HIATAL N/A 2/10/2020    Procedure: LAPAROSCOPIC HIATAL HERNIA REPAIR, NISSEN FUNDOPLICATION, ESOPHOGASTRODUODENOSCOPY, PEG TUBE PLACEMENT.;  Surgeon: Alana Mack MD;  Location: [facility] OR     NASAL/SINUS POLYPECTOMY  2017    Polyps were benign     NOSE SURGERY  2007    deviated septum     TONSILLECTOMY & ADENOIDECTOMY  1981     TUBAL LIGATION  07/01/2011       Allergies:  Allergies   Allergen Reactions     Baclofen Other (See Comments)     Loss of muscle tone. Muscle weakness.     Bee Venom Shortness Of Breath, Palpitations, Anaphylaxis and Difficulty breathing     Patient does carry Epi-Pen     Chicken-Derived Products (Egg) Nausea and  Diarrhea     Compazine [Prochlorperazine]      Extreme jittery     Food      Milk     Gabapentin      Dizzy     Gluten Meal GI Disturbance     Wheat bran and wheat     Pregabalin      Seasonal Allergies      Dust, mold     Topiramate      Pt does not remember reaction       Meds:  Current Outpatient Medications   Medication     acetaminophen (TYLENOL) 500 MG tablet     amitriptyline (ELAVIL) 10 MG tablet     buprenorphine (BUTRANS) 10 MCG/HR WK patch     cetirizine (ZYRTEC) 10 MG tablet     co-enzyme Q-10 100 MG CAPS capsule     doxepin (SILENOR) 3 MG tablet     DULoxetine (CYMBALTA) 30 MG capsule     EPINEPHrine (EPIPEN/ADRENACLICK/OR ANY BX GENERIC EQUIV) 0.3 MG/0.3ML injection 2-pack     ferrous fumarate 65 mg, [place]. FE,-Vitamin C 125 mg (VITRON-C)  MG TABS tablet     fluticasone (FLONASE) 50 MCG/ACT nasal spray     HYDROmorphone (DILAUDID) 2 MG tablet     ivabradine (CORLANOR) 7.5 MG tablet     Lidocaine (LIDOCARE) 4 % Patch     linaclotide (LINZESS) 145 MCG capsule     lubiprostone (AMITIZA) 8 MCG capsule     medical cannabis (Patient's own supply.  Not a prescription)     metoclopramide (REGLAN) 10 MG tablet     Multiple Vitamins-Minerals (MULTIVITAMIN PO)     naloxone (NARCAN) 4 MG/0.1ML nasal spray     naproxen sodium (ANAPROX) 220 MG tablet     Probiotic Product (PROBIOTIC DAILY PO)     propranolol ER (INDERAL LA) 120 MG 24 hr capsule     Rhubarb (ESTROVEN COMPLETE) 4 MG TABS     rizatriptan (MAXALT-MLT) 5 MG ODT     SUMAtriptan (IMITREX STATDOSE) 6 MG/0.5ML pen injector kit     Suvorexant (BELSOMRA) 5 MG tablet     tiZANidine (ZANAFLEX) 4 MG tablet     traMADol (ULTRAM) 50 MG tablet     vitamin B complex with vitamin C (STRESS TAB) tablet     VITAMIN D, CHOLECALCIFEROL, PO     Current Facility-Administered Medications   Medication     Botulinum Toxin Type A (BOTOX) 200 units injection 155 Units       Fam Hx:  Family History   Problem Relation Age of Onset     Connective Tissue Disorder Mother          eds     Connective Tissue Disorder Sister         eds     Cancer Father         Spinal tumor grew into spinal cord, went in brain     Migraines Father      Diabetes Maternal Grandmother         Elderly diabetes     Heart Disease Maternal Grandmother      Hypertension Maternal Grandmother      Diabetes Paternal Grandmother         Elderly diabetes     Diabetes Paternal Grandfather         Elderly diabetes     Asthma Sister         Pediatric Asthma     Migraines Sister      Asthma Son         Pediatric Asthma     Thyroid Disease Sister         ,     Migraines Sister      Migraines Sister      Breast Cancer No family hx of        P/S Hx:  Social History     Tobacco Use     Smoking status: Former Smoker     Packs/day: 0.20     Years: 10.00     Pack years: 2.00     Types: Cigarettes     Start date: 1991     Quit date: 2013     Years since quittin.8     Smokeless tobacco: Never Used   Substance Use Topics     Alcohol use: Yes     Comment: once a month         Physical Exam:  Very pleasant, healthy appearing, alert, oriented x 3, cooperative.  Normal mood and affect.  Not in cardiorespiratory distress.  LMP  (LMP Unknown)   Normal upright posture.    Normal gait without assistive device.  No antalgia / imbalance.  Able to walk on toes and on heels with ease.  Back: no deformity, no skin lesions or surgical scars.  Localizes pain at midline lumbosacral junction  Tenderness: (+) midline, (-) paraspinal though QL are in spasm, (+) R and L PSIS. (+) bilateral piriformis (+) bilateral greater trochanters    Neuro Exam:  Motor:        LOWER EXTREMITY Left Right   Hip flexion 5/5 5/5   Knee flexion 5/5 5/5   Knee extension 5/5 5/5   Ankle dorsiflexion 5/5 5/5   Ankle plantarflexion 5/5 5/5   Great toe extension 4/5 4/5      Sensory:  Intact to light touch in both LE's.     Lower Extremity:  Equal leg lengths, full pulses, (-) atrophy / asymmetry.  Full painless passive knee and ankle motion.  + log roll. + hip ROM  bilat --> both cause lateral pain over greater trochanters    Straight leg raise: (-) right, (-) left.  Hip impingement: (-) right, (-) left.  WINSTON/Dewey's: (-) right, (-) left.      Sacroiliac Joint Tests:    Right Left   Yanci Finger Test  - -   WINSTON  - -   Thigh Thrust: - -   Pelvic Compression Test  + -   Palpation  ++ ++   Pelvic Gapping  - -   Gaenslen s Test  + +   Sacral Thrust (SI) - -     Imaging:  10/4/2022 EOS full spine standing AP/lateral view x-rays: Neutral coronal alignment.  No significant degenerative changes to hips.  No significant leg length discrepancy.  Interbody fusion hardware present L4-S1.  Mild degeneration L3-4.  No evidence of spondylolisthesis.  Sagittal measurements:  LL 70, ideal 56.5  L4-S1 43, ideal 38  PI 57  PT 10  SVA -0.7cm  TPA 5  GT 7  T10-L2 lord 6    8/23/2022 CT abdomen pelvis without contrast: Solid fusion mass present 4-S1 without significant foraminal or canal stenosis.  Vacuum phenomenon bilateral SI joints.  Facet arthropathy L3-4.    7/21/2022 MRI lumbar spine without contrast: Mild spinal canal stenosis L3-4.  Facet arthropathy L3-4. No significant foraminal stenosis    Impression:   1. Chronic low back pain, mild adjacent segment disease L3-4. Exam findings consistent with piriformis syndrome bilateral, sacroiliitis bilateral, and greater trochanteric bursitis  2. Solid arthrodesis s/p TLIF L4-5,L5-S1 (12/2013), s/p removal of posterior instrumentation (2014).  3. Multiple medical co-morbidities, including Tom-Danlos type III, Fibromyalgia, DM, hypothryoidism    Plan:   Reviewed XR and MR imaging results with patient today. She has overall good sagittal and coronal alignment. She is starting to develop degeneration at L3-4 level with mild stenosis and facet changes, but nothing to warrant the need for urgent surgery. We discussed that it would be best to optimize non-operative treatment options before resorting to surgical intervention. Surgery would  involve extending fusion up to L3-4 level.   On exam, she is exhibiting findings of SI joint pain, piriformis syndrome, facet based pain, and greater trochanteric bursitis. Recommend seeing if injections & therapy could help with these symptoms. Dr. Enciso sent OPTIMIZERxt message to Dr. Martinez today about this. Recommend she follow up as needed. If pain worsens despite non-operative treatment options, then we would consider surgical intervention.    - continue with PT through [facility]  - continue with non-operative treatment options via Dr. Martinez. Sent OPTIMIZERxt message to Dr. Martinez today inquring whether she could get piriformis botox injections vs lumbar MBB/ RFA vs SI joint injections  - follow up PRN. Obtain lumbar flexion/extension XR    All questions and concerns were answered to the patient's apparent satisfaction before leaving the clinic.     Respectfully,  Cheyenne Vernon (curt Bolanos)SIMONE    Attestation:  I (Dr. Darrell Enciso - Spine Surgeon) have personally evaluated patient with SIMONE Vernon, and agree with findings and plan outlined in the note, which I also edited.  I discussed at length with the patient/family, explained the nature of spinal condition, and formulated workup and/or treatment plan together.  All questions were answered to the best of my ability and to patient's apparent satisfaction.    45 minutes spent on the date of the encounter doing chart review/review of outside records/review of test results/interpretation of tests/patient visit/documentation/discussion with other provider(s)/discussion with patient and family.    Darrell Enciso MD    Orthopaedic Spine Surgery  Dept Orthopaedic Surgery, [facility]  [phone] office, [phone] pager  [email or web address]

## 2022-10-10 ENCOUNTER — TELEPHONE (OUTPATIENT)
Dept: GASTROENTEROLOGY | Facility: CLINIC | Age: 52
End: 2022-10-10

## 2022-10-10 NOTE — TELEPHONE ENCOUNTER
Attempted to contact patient regarding upcoming EGD procedure on 10.14.22 for pre assessment questions. No answer.     Left message to return call to [phone] #4    Covid test scheduled ? Discuss at home rapid antigen COVID test 1-2 days prior to procedure.    Arrival time: 1000    [place] [facility] [place]    Indication for procedure: evaluate Nissen wrap and take biopsies (has history of EoE)    Anticoagulants: No.    Britt Moya RN

## 2022-10-11 NOTE — TELEPHONE ENCOUNTER
Pre assessment questions completed for upcoming EGD procedure scheduled on 10/14/22    COVID policy reviewed. Patient to complete rapid antigen test one to two days before their scheduled procedure. Patient to bring photo of the results when they come in for their procedure.    Reviewed procedural arrival time 1000 and facility location [facility].    Designated  policy reviewed. Instructed to have someone stay 24 hours post procedure.     Reviewed EGD prep instructions.     Anticoagulation/blood thinners? None    Electronic implanted devices? None    Patient verbalized understanding and had no questions or concerns at this time.    Yanely Fraire RN

## 2022-10-12 ASSESSMENT — ENCOUNTER SYMPTOMS
INCREASED ENERGY: 1
COUGH DISTURBING SLEEP: 0
HOARSE VOICE: 1
SLEEP DISTURBANCES DUE TO BREATHING: 1
DYSPNEA ON EXERTION: 1
LOSS OF CONSCIOUSNESS: 1
VOMITING: 0
HYPOTENSION: 1
NECK MASS: 1
LEG PAIN: 1
HEADACHES: 1
SPEECH CHANGE: 0
SPUTUM PRODUCTION: 0
CONSTIPATION: 0
HEMOPTYSIS: 0
BOWEL INCONTINENCE: 0
EYE WATERING: 1
NAUSEA: 1
HALLUCINATIONS: 0
MUSCLE WEAKNESS: 1
HYPERTENSION: 0
BACK PAIN: 1
TREMORS: 0
SWOLLEN GLANDS: 1
DYSURIA: 0
WEIGHT LOSS: 1
SKIN CHANGES: 0
FEVER: 0
WEAKNESS: 1
POLYDIPSIA: 1
HEMATURIA: 0
DOUBLE VISION: 1
FLANK PAIN: 1
RECTAL PAIN: 0
SEIZURES: 0
SINUS CONGESTION: 1
COUGH: 0
POOR WOUND HEALING: 1
EYE IRRITATION: 1
ABDOMINAL PAIN: 1
NECK PAIN: 1
SYNCOPE: 0
SHORTNESS OF BREATH: 1
EYE PAIN: 1
SNORES LOUDLY: 1
JOINT SWELLING: 1
TASTE DISTURBANCE: 0
MUSCLE CRAMPS: 1
FATIGUE: 1
STIFFNESS: 1
NAIL CHANGES: 1
BRUISES/BLEEDS EASILY: 1
LIGHT-HEADEDNESS: 1
TROUBLE SWALLOWING: 1
BLOOD IN STOOL: 0
PALPITATIONS: 1
NUMBNESS: 1
MYALGIAS: 1
DISTURBANCES IN COORDINATION: 1
POLYPHAGIA: 0
WHEEZING: 0
HEARTBURN: 0
DIZZINESS: 1
DECREASED APPETITE: 0
TINGLING: 1
JAUNDICE: 0
CHILLS: 1
ORTHOPNEA: 1
SINUS PAIN: 1
PARALYSIS: 0
BLOATING: 1
SMELL DISTURBANCE: 0
EYE REDNESS: 1
ARTHRALGIAS: 1
POSTURAL DYSPNEA: 1
SORE THROAT: 0
EXERCISE INTOLERANCE: 1
NIGHT SWEATS: 1
DIFFICULTY URINATING: 1
MEMORY LOSS: 1
ALTERED TEMPERATURE REGULATION: 1
WEIGHT GAIN: 0
DIARRHEA: 1

## 2022-10-13 ENCOUNTER — ANESTHESIA EVENT (OUTPATIENT)
Dept: SURGERY | Facility: AMBULATORY SURGERY CENTER | Age: 52
End: 2022-10-13
Payer: COMMERCIAL

## 2022-10-13 ENCOUNTER — OFFICE VISIT (OUTPATIENT)
Dept: ANESTHESIOLOGY | Facility: CLINIC | Age: 52
End: 2022-10-13
Payer: COMMERCIAL

## 2022-10-13 VITALS — HEART RATE: 62 BPM | SYSTOLIC BLOOD PRESSURE: 127 MMHG | DIASTOLIC BLOOD PRESSURE: 84 MMHG | OXYGEN SATURATION: 99 %

## 2022-10-13 DIAGNOSIS — M96.1 POST LAMINECTOMY SYNDROME: ICD-10-CM

## 2022-10-13 DIAGNOSIS — M96.1 POSTLAMINECTOMY SYNDROME: ICD-10-CM

## 2022-10-13 DIAGNOSIS — M54.12 CERVICAL RADICULOPATHY: Primary | ICD-10-CM

## 2022-10-13 PROCEDURE — 99214 OFFICE O/P EST MOD 30 MIN: CPT | Performed by: ANESTHESIOLOGY

## 2022-10-13 RX ORDER — TRAMADOL HYDROCHLORIDE 50 MG/1
50 TABLET ORAL EVERY 6 HOURS PRN
Qty: 60 TABLET | Refills: 1 | Status: SHIPPED | OUTPATIENT
Start: 2022-10-13 | End: 2022-12-29

## 2022-10-13 RX ORDER — DIAZEPAM 5 MG
5-10 TABLET ORAL
Status: CANCELLED | OUTPATIENT
Start: 2022-10-13

## 2022-10-13 RX ORDER — BUPRENORPHINE 10 UG/H
1 PATCH TRANSDERMAL
Qty: 4 PATCH | Refills: 1 | Status: SHIPPED | OUTPATIENT
Start: 2022-10-13 | End: 2022-12-30

## 2022-10-13 ASSESSMENT — PAIN SCALES - GENERAL: PAINLEVEL: SEVERE PAIN (6)

## 2022-10-13 NOTE — NURSING NOTE
43-year-old female sent from  for back pain. Patient's been having back pain for \"over a year\". Comes and goes at times with a the pain started around 430. She got nauseated so she went to [facility]. They did a chest x-ray and felt that it had some changes from her chest x-ray 15 years ago. Patient denies shortness of breath no radiation of pain of the to her back. No fevers chills cough cold or flulike symptoms. Back Pain    This is a chronic problem. The current episode started more than 1 week ago. The problem has not changed since onset. The problem occurs constantly. The pain is associated with no known injury. The pain is present in the upper back. The quality of the pain is described as aching. The pain does not radiate. The pain is at a severity of 9/10. The pain is moderate. Pertinent negatives include no chest pain, no fever, no numbness, no weight loss, no headaches, no abdominal pain, no abdominal swelling, no bowel incontinence, no perianal numbness, no bladder incontinence, no dysuria, no pelvic pain, no leg pain, no paresthesias, no paresis, no tingling and no weakness. She has tried nothing for the symptoms.         Past Medical History:   Diagnosis Date    DJD (degenerative joint disease)     Glaucoma     HLD (hyperlipidemia)     Hypertension     Hypoxemia     Multinodular goiter     Obesity, unspecified     Organic hypersomnia, unspecified     Organic sleep apnea     NIDHI (obstructive sleep apnea)     Osteopenia     Osteoporosis, unspecified     RLS (restless legs syndrome)     Thyroid disease     Unspecified hypothyroidism     Vertigo     Vertigo        Past Surgical History:   Procedure Laterality Date    HX KNEE ARTHROSCOPY Right     HX CARMINE AND BSO      HX THYROIDECTOMY  2010    HX TUBAL LIGATION  1964         Family History:   Problem Relation Age of Onset    Sleep Apnea Mother     Hypertension Mother     Hypertension Father     Cancer Father     High RN reviewed AVS with patient. Patient to contact clinic if any questions/concerns. Patient verbalized understanding.    Daisy Delarosa RN     Cholesterol Father     Elevated Lipids Maternal Grandmother     Sleep Apnea Other         2 Siblings       Social History     Socioeconomic History    Marital status: SINGLE     Spouse name: Not on file    Number of children: Not on file    Years of education: Not on file    Highest education level: Not on file   Occupational History    Not on file   Social Needs    Financial resource strain: Not on file    Food insecurity     Worry: Not on file     Inability: Not on file    Transportation needs     Medical: Not on file     Non-medical: Not on file   Tobacco Use    Smoking status: Former Smoker    Smokeless tobacco: Never Used    Tobacco comment:   quit 1979   Substance and Sexual Activity    Alcohol use: No     Alcohol/week: 0.0 standard drinks    Drug use: No    Sexual activity: Never   Lifestyle    Physical activity     Days per week: Not on file     Minutes per session: Not on file    Stress: Not on file   Relationships    Social connections     Talks on phone: Not on file     Gets together: Not on file     Attends [employer] service: Not on file     Active member of club or organization: Not on file     Attends meetings of clubs or organizations: Not on file     Relationship status: Not on file    Intimate partner violence     Fear of current or ex partner: Not on file     Emotionally abused: Not on file     Physically abused: Not on file     Forced sexual activity: Not on file   Other Topics Concern     Service No    Blood Transfusions No    Caffeine Concern No    Occupational Exposure Not Asked    Hobby Hazards No    Sleep Concern No    Stress Concern No    Weight Concern Yes    Special Diet Yes    Back Care Not Asked    Exercise No    Bike Helmet No    Seat Belt Yes    Self-Exams Not Asked   Social History Narrative    Not on file         ALLERGIES: Patient has no known allergies. Review of Systems   Constitutional: Negative.   Negative for activity change, fever and weight loss. HENT: Negative. Eyes: Negative. Respiratory: Negative. Cardiovascular: Negative. Negative for chest pain. Gastrointestinal: Negative. Negative for abdominal pain and bowel incontinence. Genitourinary: Negative. Negative for bladder incontinence, dysuria and pelvic pain. Musculoskeletal: Positive for back pain. Skin: Negative. Neurological: Negative. Negative for tingling, weakness, numbness, headaches and paresthesias. Psychiatric/Behavioral: Negative. All other systems reviewed and are negative. Vitals:    09/28/20 2325   BP: (!) 159/79   Pulse: 71   Temp: 98.1 °F (36.7 °C)   SpO2: 96%   Weight: 88 kg (194 lb)   Height: 5' 3\" (1.6 m)            Physical Exam  Vitals signs and nursing note reviewed. Constitutional:       General: She is not in acute distress. Appearance: She is well-developed. She is not diaphoretic. HENT:      Head: Normocephalic and atraumatic. Right Ear: External ear normal.      Left Ear: External ear normal.      Nose: Nose normal. No congestion or rhinorrhea. Mouth/Throat:      Mouth: Mucous membranes are moist.      Pharynx: Oropharynx is clear. No oropharyngeal exudate or posterior oropharyngeal erythema. Eyes:      General: No scleral icterus. Right eye: No discharge. Left eye: No discharge. Conjunctiva/sclera: Conjunctivae normal.      Pupils: Pupils are equal, round, and reactive to light. Neck:      Musculoskeletal: Normal range of motion and neck supple. No neck rigidity or muscular tenderness. Vascular: No carotid bruit or JVD. Trachea: No tracheal deviation. Cardiovascular:      Rate and Rhythm: Normal rate and regular rhythm. Pulses: Normal pulses. Heart sounds: Normal heart sounds. No murmur. No friction rub. No gallop. Pulmonary:      Effort: Pulmonary effort is normal. No respiratory distress. Breath sounds: Normal breath sounds. No stridor. No wheezing. Chest:      Chest wall: No tenderness. Abdominal:      General: Bowel sounds are normal. There is no distension. Palpations: Abdomen is soft. There is no mass. Tenderness: There is no abdominal tenderness. There is no right CVA tenderness, left CVA tenderness, guarding or rebound. Hernia: No hernia is present. Musculoskeletal: Normal range of motion. General: No tenderness. Lymphadenopathy:      Cervical: No cervical adenopathy. Skin:     General: Skin is warm and dry. Coloration: Skin is not pale. Findings: No erythema or rash. Neurological:      Mental Status: She is alert and oriented to person, place, and time. Cranial Nerves: No cranial nerve deficit. Sensory: No sensory deficit. Motor: No weakness. Coordination: Coordination normal.      Gait: Gait normal.      Deep Tendon Reflexes: Reflexes normal.   Psychiatric:         Behavior: Behavior normal.         Thought Content: Thought content normal.          [facility]  Number of Diagnoses or Management Options  Acute midline thoracic back pain:   Diagnosis management comments: Patient was sent to the ED for abnormal chest x-ray. Chest x-ray from 15 years ago was different and tonight at  he felt the mediastinum is slightly widened. Technique is possibly PA but not clearly. X-ray and CT of the chest here reveals no acute pathology she had some thyroid removed and there is a paratracheal shadow that the radiologist believes to be a residual to the surgery. Thoracic aorta appears normal.  We will have her follow-up closely with her primary care doctor.        Amount and/or Complexity of Data Reviewed  Clinical lab tests: ordered and reviewed  Tests in the radiology section of CPT®: ordered and reviewed  Tests in the medicine section of CPT®: ordered and reviewed    Risk of Complications, Morbidity, and/or Mortality  Presenting problems: high  Diagnostic procedures: high  Management options: high           Procedures

## 2022-10-13 NOTE — PATIENT INSTRUCTIONS
Medications:    amitriptyline (ELAVIL) 25 MG tablet - Take 1 tablet (25 mg) by mouth At Bedtime    buprenorphine (BUTRANS) 10 MCG/HR WK patch -  Place 1 patch onto the skin every 7 days    traMADol (ULTRAM) 50 MG tablet - Take 1 tablet (50 mg) by mouth every 6 hours as needed for severe pain    For refills of opioid medications - You MUST call (Or MyChart) the clinic DIRECTLY and at least 7 days before you run out of your medication.    Please provide the clinic with a minium of 1 week notice, on all prescription refills.       Recommended Follow up:      Follow up as needed.    To speak with a nurse, schedule/reschedule/cancel a clinic appointment, or request a medication refill call: [phone].    You can also reach us by [employer]: [email or web address]

## 2022-10-13 NOTE — NURSING NOTE
Patient presents with:  RECHECK: Follow-up: further evaluation for radiant spinal pain      Severe Pain (6)     Pain Medications     Analgesics Other Refills Start End     acetaminophen (TYLENOL) 500 MG tablet          Sig - Route: Take 1,000 mg by mouth every 6 hours as needed for mild pain - Oral    Class: Historical    Opioid Agonists Refills Start End     traMADol (ULTRAM) 50 MG tablet    1 10/13/2022     Sig - Route: Take 1 tablet (50 mg) by mouth every 6 hours as needed for severe pain - Oral    Class: E-Prescribe    No prior authorization was found for this prescription.    Found prior authorization for another prescription for the same medication: Closed - Other     HYDROmorphone (DILAUDID) 2 MG tablet    0 7/21/2022     Sig - Route: Take 2 tablets (4 mg) by mouth every 6 hours as needed for pain - Oral    Class: E-Prescribe    Earliest Fill Date: 7/21/2022    Notes to Pharmacy: The patient did not refill the prior prescription as the pharmacy was out of stock.  This is a prescription for remaining medications.    Opioid Partial Agonists Refills Start End     buprenorphine (BUTRANS) 10 MCG/HR WK patch    1 10/13/2022     Sig - Route: Place 1 patch onto the skin every 7 days - Transdermal    Class: E-Prescribe    No prior authorization was found for this prescription.    Found prior authorization for another prescription for the same medication: Approved          What medications are you using for pain? Tramadol, hydromorphone, buprenorphine, cannabis    (Return Patients only) What refills are you needing today? Tramadol, buprenorphine    Uday Sawyer

## 2022-10-13 NOTE — LETTER
10/13/2022       RE: Ada Welch  [address] [address]     Dear Colleague,    Thank you for referring your patient, Ada Welch, to the [facility] [place] at [facility]. Please see a copy of my visit note below.    [facility] : Progress Note    Date of visit: 10/13/2022    Interval history:  Ada Welch is a 51 year old female who  is well known to me for multifocal chronic pain. The patient has  complex history of low back pain for ~25 years for which she underwent L4-S1 fusion (TLIF) 12/9/13.  Postoperatively, her back pain worsened (mainly LBP, but also radiated down both lower limbs in an S1 dermatomal distribution).   Underwent removal of posterior instrumentation (screws and guadalupe).  After fusion removal, her pain continued.  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  She even sought care from a EDS specialist in [place].  She is currently on disability.     She has a h/o significant autonomic dysfunction.  Since 2014, she reports that her heart rate has fluctuated from as low as  beats per minutes and her blood pressure also fluctuates at the same time without any aggravating factors.  The fluctuation of the blood pressure and heart rate happens when she is supine, sitting, standing, walking, etc.  She has seen cardiologist Dr. Russell who advised her to continue fludrocortisone, isometric exercise, low carbohydrate and low gluten diet.     She has been seeing Dr. Singh for urinary urgency and incontinence.  She is advised for improving lifestyle, dietary modification, optimizing bowel regimen and to start pelvic physical therapy.     She has seen Dr. Moncada for left upper extremity radicular pain along the C8 nerve root distribution.  Per Dr. Moncada's note, she does not have a surgical target which  may explain her symptoms.  She still continues to report this symptom.  We discussed about epidural steroid injection, however she is reluctant to perform the procedures and states she previously had a bad reaction with a steroid.   Patient also had cervical medial branch nerve block x2 with us 50% pain relief.   However medial branch nerve radiofrequency ablation was not approved by the insurance.     The patient has seen Dr. Mack for eosinophilic esophagitis, GERD, and dysphagia.    She had open hernia surgery by Dr. Mack in 2020.  Her postoperative course was uneventful.        The patient has been seeing Dr. Noemy Nguyen for chronic migraines. Patient reports that her chronic intractable headache pain recently got worse. 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  She has undergone multiple blood patches intermittently for her headache pain.    Recently saw Dr. Enciso for lower back pain secondary to persistent spinal pain after L4-S1 fusion.      2013 - L4-S1 fusion, TLIF (Dr. Williamson in [place], [place])    - removal of posterior instrumentation (screws and guadalupe) with exploration of fusion (Dr. MIKE Lucero ([facility]    She was recommended by Dr. Enciso for nonoperative treatment including epidural steroid injection,  medial branch nerve block/radiofrequency ablation of the lumbar MBB, and/or SI joint injections.  If these injections are proven to be ineffective she may need to extend her fusion up to L3-L4.    Currently she has been  managing her pain with stable dose of Butrans 10 mcg per hour q 7 days, tramadol 50 mg every 6 hours as needed which she refills every 2-3 months.  In addition she takes tizanidine 4 mg 4 times daily as needed.    She takes sumatriptan for breakthrough migraine.  Occasionally she takes oxycodone  when her pain gets significantly worse.      Today her main complaints are followin.    She is interested in cervical epidural steroid injection for  neck pain radiating to the bilateral upper extremities.  Her MRI of the cervical spine performed in July this year showed moderate neuroforaminal stenosis at C5-C6 and C6-C7.    2.  She is interested in injections in the lower lumbar spine.  In the past, did not have good results from lumbar epidural steroid injection.  However she is to give it another try.  Recommend lumbar L3-L4 epidural steroid injection followed by diagnostic lumbar MBB.     3.  Currently managing pain with Butrans patch 10 mics per hour.  She states that her pain is not adequately controlled with prn tramadol and oxycodone.  Previously she took hydromorphone.    Today I refilled her Butrans and tramadol.  In addition I gave her a prescription of amitriptyline 25 to 50 mg nightly.      [place] Prescription Monitoring Program:   Reviewed. No concerns     Review of Systems:  The 14 system ROS was reviewed and was negative except what is documented above and as follows.  Any bowel or bladder problems: none  Mood: okay    Physical Exam:  Vitals:    10/13/22 1028   BP: 127/84   BP Location: Right arm   Patient Position: Chair   Cuff Size: Adult Regular   Pulse: 62   SpO2: 99%       Medications:  Current Outpatient Medications   Medication Sig Dispense Refill     amitriptyline (ELAVIL) 25 MG tablet Take 1 tablet (25 mg) by mouth At Bedtime 30 tablet 0     buprenorphine (BUTRANS) 10 MCG/HR WK patch Place 1 patch onto the skin every 7 days 4 patch 1     traMADol (ULTRAM) 50 MG tablet Take 1 tablet (50 mg) by mouth every 6 hours as needed for severe pain 60 tablet 1     acetaminophen (TYLENOL) 500 MG tablet Take 1,000 mg by mouth every 6 hours as needed for mild pain       cetirizine (ZYRTEC) 10 MG tablet Take 10 mg by mouth daily as needed        co-enzyme Q-10 100 MG CAPS capsule Take 100 mg by mouth daily       doxepin (SILENOR) 3 MG tablet Take 1 tablet (3 mg) by mouth nightly as needed for sleep 30 tablet 1     DULoxetine (CYMBALTA) 30 MG capsule  Take 60mg in the morning and 30mg at bedtime 270 capsule 1     EPINEPHrine (EPIPEN/ADRENACLICK/OR ANY BX GENERIC EQUIV) 0.3 MG/0.3ML injection 2-pack INJECT 0.3 MLS (0.3 MG) INTO THE MUSCLE AS NEEDED FOR ANAPHYLAXIS  1     ferrous fumarate 65 mg, [place]. FE,-Vitamin C 125 mg (VITRON-C)  MG TABS tablet Take 1 tablet by mouth every other day 60 tablet 4     fluticasone (FLONASE) 50 MCG/ACT nasal spray Spray 2 sprays into both nostrils daily Call clinic to schedule follow up appointment. 48 mL 11     HYDROmorphone (DILAUDID) 2 MG tablet Take 2 tablets (4 mg) by mouth every 6 hours as needed for pain 27 tablet 0     ivabradine (CORLANOR) 7.5 MG tablet Take 1 tablet (7.5 mg) by mouth 2 times daily 180 tablet 3     Lidocaine (LIDOCARE) 4 % Patch Place 1-3 patches onto the skin every 24 hours To prevent lidocaine toxicity, patient should be patch free for 12 hrs daily. 20 patch 0     linaclotide (LINZESS) 145 MCG capsule Take 1 capsule (145 mcg) by mouth every morning (before breakfast) 30 capsule 11     lubiprostone (AMITIZA) 8 MCG capsule Take 2 capsules (16 mcg) by mouth 2 times daily 120 capsule 1     medical cannabis (Patient's own supply.  Not a prescription) Take 1 Dose by mouth See Admin Instructions Capsule formulation - uses as needed       metoclopramide (REGLAN) 10 MG tablet Take 1 tablet (10 mg) by mouth 4 times daily as needed (headache) 40 tablet 3     Multiple Vitamins-Minerals (MULTIVITAMIN PO) Take 1 tablet by mouth daily        naloxone (NARCAN) 4 MG/0.1ML nasal spray Spray 1 spray (4 mg) into one nostril alternating nostrils once as needed for opioid reversal every 2-3 minutes until assistance arrives 0.2 mL 0     naproxen sodium (ANAPROX) 220 MG tablet Take 440-660 mg by mouth daily as needed for moderate pain       Probiotic Product (PROBIOTIC DAILY PO) Take 2 packets by mouth every morning        propranolol ER (INDERAL LA) 120 MG 24 hr capsule Take 1 capsule (120 mg) by mouth three times a week  39 capsule 3     Rhubarb (ESTROVEN COMPLETE) 4 MG TABS Take 1 tablet by mouth       rizatriptan (MAXALT-MLT) 5 MG ODT TAKE 1-2 TABLETS (5-10 MG) BY MOUTH AT ONSET OF HEADACHE FOR MIGRAINE (MAX 30 MG IN 24 HOURS) 18 tablet 11     SUMAtriptan (IMITREX STATDOSE) 6 MG/0.5ML pen injector kit Inject 0.5 ml (6 mg) subcutaneously at onset of migraine, May repeat in 1 hour , Max 12 mg/24 hrs 2 kit 11     Suvorexant (BELSOMRA) 5 MG tablet Take 1-2 tablets (5-10 mg) by mouth nightly as needed for sleep May cause sedation, use with caution with pain meds or sedatives. 30 tablet 1     tiZANidine (ZANAFLEX) 4 MG tablet Take 1 tablet (4 mg) by mouth 4 times daily as needed for muscle spasms 120 tablet 1     vitamin B complex with vitamin C (STRESS TAB) tablet Take 1 tablet by mouth daily       VITAMIN D, CHOLECALCIFEROL, PO Take 2,000 Units by mouth daily         Analgesic Medications:   Medications related to Pain Management (From now, onward)    Start     Dose/Rate Route Frequency Ordered Stop    10/13/22 0000  buprenorphine (BUTRANS) 10 MCG/HR WK patch         1 patch Transdermal EVERY 7 DAYS 10/13/22 1043      10/13/22 0000  traMADol (ULTRAM) 50 MG tablet         50 mg Oral EVERY 6 HOURS PRN 10/13/22 1043      10/13/22 0000  amitriptyline (ELAVIL) 25 MG tablet         25 mg Oral AT BEDTIME 10/13/22 1047               LABORATORY VALUES:   Recent Labs   Lab Test 11/13/20  1133 02/12/20  0611    139   POTASSIUM 4.5 3.6   CHLORIDE 102 106   CO2 29 27   ANIONGAP 4 6   GLC 96 106*   BUN 10 6*   CR 0.83 0.62   JUAN 8.6 8.0*       CBC RESULTS:   Recent Labs   Lab Test 06/24/22  1122   WBC 6.5   RBC 4.74   HGB 14.8   HCT 43.5   MCV 92   MCH 31.2   MCHC 34.0   RDW 12.1          Most Recent 3 INR's:  Recent Labs   Lab Test 01/30/20  1325 02/28/19  1045 12/17/18  0920   INR 0.93 0.97 0.90           ASSESSMENT:       Diagnoses        Codes Comments     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       DDD (degenerative  disc disease), lumbar     M51.36       Chronic pain syndrome     G89.4       Chronic sacroiliac joint pain     M53.3, G89.29       Cervical facet joint syndrome     M47.812       Chronic right shoulder pain     M25.511, G89.29       CYP2D6 rapid metabolizer (H)     E88.89       Tom-Danlos syndrome     Q79.60       Bilateral acromioclavicular joint arthritis     M19.011, M19.012       Opioid contract exists     Z79.891       Intractable chronic migraine without aura and without status migrainosus           Cervical radiculopathy      PLAN:       Continue Butrans 10 mics per hour.  4 patches per month    Tramadol 50 mg daily as needed.  60 tablets dispensed.    Recommend over-the-counter lidocaine 4% patches for the most painful spots on lower back every 12 hours    Continue using medical cannabis    Start methocarbamol 500 mg 4 times daily as needed.  120 tablets dispensed    Sumatriptan/rizatriptan for migraine headache as needed.  Prescribe ER provider    Amitriptyline 25 to 50 mg nightly.    Ordered cervical epidural steroid injection.  Risk of the procedure including bleeding, infection, failed procedure, nerve injury, paralysis discussed with her.  All questions were answered.     Follow-up 6 months      Assessment will be ongoing with changes in treatment as indicated.  Benefits/risks/alternatives to treatment have been reviewed and the patient has been instructed to contact this office if they have any questions or concerns.  This plan of care has been discussed with the patient and the patient is in agreement.     Faiza Martinez MD, PHD      )    Answers for HPI/ROS submitted by the patient on 10/12/2022  General Symptoms: Yes  Skin Symptoms: Yes  HENT Symptoms: Yes  EYE SYMPTOMS: Yes  HEART SYMPTOMS: Yes  LUNG SYMPTOMS: Yes  INTESTINAL SYMPTOMS: Yes  URINARY SYMPTOMS: Yes  GYNECOLOGIC SYMPTOMS: No  BREAST SYMPTOMS: No  SKELETAL SYMPTOMS: Yes  BLOOD SYMPTOMS: Yes  NERVOUS SYSTEM SYMPTOMS: Yes  MENTAL HEALTH  SYMPTOMS: No  Ear pain: No  Ear discharge: No  Hearing loss: Yes  Tinnitus: Yes  Nosebleeds: No  Congestion: Yes  Sinus pain: Yes  Trouble swallowing: Yes   Voice hoarseness: Yes  Mouth sores: No  Sore throat: No  Tooth pain: No  Gum tenderness: No  Bleeding gums: No  Change in taste: No  Change in sense of smell: No  Dry mouth: Yes  Hearing aid used: No  Neck lump: Yes  Fever: No  Loss of appetite: No  Weight loss: Yes  Weight gain: No  Fatigue: Yes  Night sweats: Yes  Chills: Yes  Increased stress: No  Excessive hunger: No  Excessive thirst: Yes  Feeling hot or cold when others believe the temperature is normal: Yes  Loss of height: No  Post-operative complications: Yes  Surgical site pain: Yes  Hallucinations: No  Change in or Loss of Energy: Yes  Hyperactivity: No  Confusion: No  Changes in hair: No  Changes in moles/birth marks: No  Itching: Yes  Rashes: No  Changes in nails: Yes  Acne: No  Hair in places you don't want it: No  Change in facial hair: No  Warts: No  Non-healing sores: Yes  Scarring: Yes  Flaking of skin: Yes  Color changes of hands/feet in cold : Yes  Sun sensitivity: No  Skin thickening: Yes  Eye pain: Yes  Vision loss: Yes  Dry eyes: Yes  Watery eyes: Yes  Eye bulging: No  Double vision: Yes  Flashing of lights: Yes  Spots: Yes  Floaters: Yes  Redness: Yes  Crossed eyes: No  Tunnel Vision: Yes  Yellowing of eyes: No  Eye irritation: Yes  Chest pain or pressure: Yes  Fast or irregular heartbeat: Yes  Pain in legs with walking: Yes  Trouble breathing while lying down: Yes  Fingers or toes appear blue: Yes  High blood pressure: No  Low blood pressure: Yes  Fainting: No  Murmurs: No  Pacemaker: No  Varicose veins: No  Wake up at night with shortness of breath: Yes  Light-headedness: Yes  Exercise intolerance: Yes  Cough: No  Sputum or phlegm: No  Coughing up blood: No  Difficulty breating or shortness of breath: Yes  Snoring: Yes  Wheezing: No  Difficulty breathing on exertion: Yes  Nighttime  Cough: No  Difficulty breathing when lying flat: Yes  Heart burn or indigestion: No  Nausea: Yes  Vomiting: No  Abdominal pain: Yes  Bloating: Yes  Constipation: No  Diarrhea: Yes  Blood in stool: No  Black stools: No  Rectal or Anal pain: No  Fecal incontinence: No  Yellowing of skin or eyes: No  Vomit with blood: No  Change in stools: Yes  Trouble holding urine or incontinence: Yes  Pain or burning: No  Trouble starting or stopping: Yes  Increased frequency of urination: Yes  Blood in urine: No  Decreased frequency of urination: No  Frequent nighttime urination: Yes  Flank pain: Yes  Difficulty emptying bladder: Yes  Back pain: Yes  Muscle aches: Yes  Neck pain: Yes  Swollen joints: Yes  Joint pain: Yes  Bone pain: Yes  Muscle cramps: Yes  Muscle weakness: Yes  Joint stiffness: Yes  Bone fracture: No  Edema or swelling: Yes  Anemia: Yes  Swollen glands: Yes  Easy bleeding or bruising: Yes  Trouble with coordination: Yes  Dizziness or trouble with balance: Yes  Fainting or black-out spells: Yes  Memory loss: Yes  Headache: Yes  Seizures: No  Speech problems: No  Tingling: Yes  Tremor: No  Weakness: Yes  Difficulty walking: No  Paralysis: No  Numbness: Yes          Again, thank you for allowing me to participate in the care of your patient.      Sincerely,    Faiaz Martinez MD

## 2022-10-13 NOTE — H&P (VIEW-ONLY)
[facility] : Progress Note    Date of visit: 10/13/2022    Interval history:  Ada Welch is a 51 year old female who  is well known to me for multifocal chronic pain. The patient has  complex history of low back pain for ~25 years for which she underwent L4-S1 fusion (TLIF) 12/9/13.  Postoperatively, her back pain worsened (mainly LBP, but also radiated down both lower limbs in an S1 dermatomal distribution).   Underwent removal of posterior instrumentation (screws and guadalupe).  After fusion removal, her pain continued.  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  She even sought care from a EDS specialist in [place].  She is currently on disability.     She has a h/o significant autonomic dysfunction.  Since 2014, she reports that her heart rate has fluctuated from as low as  beats per minutes and her blood pressure also fluctuates at the same time without any aggravating factors.  The fluctuation of the blood pressure and heart rate happens when she is supine, sitting, standing, walking, etc.  She has seen cardiologist Dr. Russell who advised her to continue fludrocortisone, isometric exercise, low carbohydrate and low gluten diet.     She has been seeing Dr. Singh for urinary urgency and incontinence.  She is advised for improving lifestyle, dietary modification, optimizing bowel regimen and to start pelvic physical therapy.     She has seen Dr. Moncada for left upper extremity radicular pain along the C8 nerve root distribution.  Per Dr. Moncada's note, she does not have a surgical target which may explain her symptoms.  She still continues to report this symptom.  We discussed about epidural steroid injection, however she is reluctant to perform the procedures and states she previously had a bad reaction with a steroid.   Patient also had cervical medial branch nerve block x2 with us 50% pain relief.   However medial branch  nerve radiofrequency ablation was not approved by the insurance.     The patient has seen Dr. Mack for eosinophilic esophagitis, GERD, and dysphagia.    She had open hernia surgery by Dr. Mack in 2020.  Her postoperative course was uneventful.        The patient has been seeing Dr. Noemy Nguyen for chronic migraines. Patient reports that her chronic intractable headache pain recently got worse. 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  She has undergone multiple blood patches intermittently for her headache pain.    Recently saw Dr. Enciso for lower back pain secondary to persistent spinal pain after L4-S1 fusion.      2013 - L4-S1 fusion, TLIF (Dr. Williamson in [place], [place])    - removal of posterior instrumentation (screws and guadalupe) with exploration of fusion (Dr. MIKE Lucero ([facility]))    She was recommended by Dr. Enciso for nonoperative treatment including epidural steroid injection,  medial branch nerve block/radiofrequency ablation of the lumbar MBB, and/or SI joint injections.  If these injections are proven to be ineffective she may need to extend her fusion up to L3-L4.    Currently she has been  managing her pain with stable dose of Butrans 10 mcg per hour q 7 days, tramadol 50 mg every 6 hours as needed which she refills every 2-3 months.  In addition she takes tizanidine 4 mg 4 times daily as needed.    She takes sumatriptan for breakthrough migraine.  Occasionally she takes oxycodone  when her pain gets significantly worse.      Today her main complaints are followin.    She is interested in cervical epidural steroid injection for neck pain radiating to the bilateral upper extremities.  Her MRI of the cervical spine performed in July this year showed moderate neuroforaminal stenosis at C5-C6 and C6-C7.    2.  She is interested in injections in the lower lumbar spine.  In the past, did not have good results from lumbar epidural steroid injection.  However she is  to give it another try.  Recommend lumbar L3-L4 epidural steroid injection followed by diagnostic lumbar MBB.     3.  Currently managing pain with Butrans patch 10 mics per hour.  She states that her pain is not adequately controlled with prn tramadol and oxycodone.  Previously she took hydromorphone.    Today I refilled her Butrans and tramadol.  In addition I gave her a prescription of amitriptyline 25 to 50 mg nightly.      [place] Prescription Monitoring Program:   Reviewed. No concerns     Review of Systems:  The 14 system ROS was reviewed and was negative except what is documented above and as follows.  Any bowel or bladder problems: none  Mood: okay    Physical Exam:  Vitals:    10/13/22 1028   BP: 127/84   BP Location: Right arm   Patient Position: Chair   Cuff Size: Adult Regular   Pulse: 62   SpO2: 99%       Medications:  Current Outpatient Medications   Medication Sig Dispense Refill     amitriptyline (ELAVIL) 25 MG tablet Take 1 tablet (25 mg) by mouth At Bedtime 30 tablet 0     buprenorphine (BUTRANS) 10 MCG/HR WK patch Place 1 patch onto the skin every 7 days 4 patch 1     traMADol (ULTRAM) 50 MG tablet Take 1 tablet (50 mg) by mouth every 6 hours as needed for severe pain 60 tablet 1     acetaminophen (TYLENOL) 500 MG tablet Take 1,000 mg by mouth every 6 hours as needed for mild pain       cetirizine (ZYRTEC) 10 MG tablet Take 10 mg by mouth daily as needed        co-enzyme Q-10 100 MG CAPS capsule Take 100 mg by mouth daily       doxepin (SILENOR) 3 MG tablet Take 1 tablet (3 mg) by mouth nightly as needed for sleep 30 tablet 1     DULoxetine (CYMBALTA) 30 MG capsule Take 60mg in the morning and 30mg at bedtime 270 capsule 1     EPINEPHrine (EPIPEN/ADRENACLICK/OR ANY BX GENERIC EQUIV) 0.3 MG/0.3ML injection 2-pack INJECT 0.3 MLS (0.3 MG) INTO THE MUSCLE AS NEEDED FOR ANAPHYLAXIS  1     ferrous fumarate 65 mg, [place]. FE,-Vitamin C 125 mg (VITRON-C)  MG TABS tablet Take 1 tablet by mouth every  other day 60 tablet 4     fluticasone (FLONASE) 50 MCG/ACT nasal spray Spray 2 sprays into both nostrils daily Call clinic to schedule follow up appointment. 48 mL 11     HYDROmorphone (DILAUDID) 2 MG tablet Take 2 tablets (4 mg) by mouth every 6 hours as needed for pain 27 tablet 0     ivabradine (CORLANOR) 7.5 MG tablet Take 1 tablet (7.5 mg) by mouth 2 times daily 180 tablet 3     Lidocaine (LIDOCARE) 4 % Patch Place 1-3 patches onto the skin every 24 hours To prevent lidocaine toxicity, patient should be patch free for 12 hrs daily. 20 patch 0     linaclotide (LINZESS) 145 MCG capsule Take 1 capsule (145 mcg) by mouth every morning (before breakfast) 30 capsule 11     lubiprostone (AMITIZA) 8 MCG capsule Take 2 capsules (16 mcg) by mouth 2 times daily 120 capsule 1     medical cannabis (Patient's own supply.  Not a prescription) Take 1 Dose by mouth See Admin Instructions Capsule formulation - uses as needed       metoclopramide (REGLAN) 10 MG tablet Take 1 tablet (10 mg) by mouth 4 times daily as needed (headache) 40 tablet 3     Multiple Vitamins-Minerals (MULTIVITAMIN PO) Take 1 tablet by mouth daily        naloxone (NARCAN) 4 MG/0.1ML nasal spray Spray 1 spray (4 mg) into one nostril alternating nostrils once as needed for opioid reversal every 2-3 minutes until assistance arrives 0.2 mL 0     naproxen sodium (ANAPROX) 220 MG tablet Take 440-660 mg by mouth daily as needed for moderate pain       Probiotic Product (PROBIOTIC DAILY PO) Take 2 packets by mouth every morning        propranolol ER (INDERAL LA) 120 MG 24 hr capsule Take 1 capsule (120 mg) by mouth three times a week 39 capsule 3     Rhubarb (ESTROVEN COMPLETE) 4 MG TABS Take 1 tablet by mouth       rizatriptan (MAXALT-MLT) 5 MG ODT TAKE 1-2 TABLETS (5-10 MG) BY MOUTH AT ONSET OF HEADACHE FOR MIGRAINE (MAX 30 MG IN 24 HOURS) 18 tablet 11     SUMAtriptan (IMITREX STATDOSE) 6 MG/0.5ML pen injector kit Inject 0.5 ml (6 mg) subcutaneously at onset of  migraine, May repeat in 1 hour , Max 12 mg/24 hrs 2 kit 11     Suvorexant (BELSOMRA) 5 MG tablet Take 1-2 tablets (5-10 mg) by mouth nightly as needed for sleep May cause sedation, use with caution with pain meds or sedatives. 30 tablet 1     tiZANidine (ZANAFLEX) 4 MG tablet Take 1 tablet (4 mg) by mouth 4 times daily as needed for muscle spasms 120 tablet 1     vitamin B complex with vitamin C (STRESS TAB) tablet Take 1 tablet by mouth daily       VITAMIN D, CHOLECALCIFEROL, PO Take 2,000 Units by mouth daily         Analgesic Medications:   Medications related to Pain Management (From now, onward)    Start     Dose/Rate Route Frequency Ordered Stop    10/13/22 0000  buprenorphine (BUTRANS) 10 MCG/HR WK patch         1 patch Transdermal EVERY 7 DAYS 10/13/22 1043      10/13/22 0000  traMADol (ULTRAM) 50 MG tablet         50 mg Oral EVERY 6 HOURS PRN 10/13/22 1043      10/13/22 0000  amitriptyline (ELAVIL) 25 MG tablet         25 mg Oral AT BEDTIME 10/13/22 1047               LABORATORY VALUES:   Recent Labs   Lab Test 11/13/20  1133 02/12/20  0611    139   POTASSIUM 4.5 3.6   CHLORIDE 102 106   CO2 29 27   ANIONGAP 4 6   GLC 96 106*   BUN 10 6*   CR 0.83 0.62   JUAN 8.6 8.0*       CBC RESULTS:   Recent Labs   Lab Test 06/24/22  1122   WBC 6.5   RBC 4.74   HGB 14.8   HCT 43.5   MCV 92   MCH 31.2   MCHC 34.0   RDW 12.1          Most Recent 3 INR's:  Recent Labs   Lab Test 01/30/20  1325 02/28/19  1045 12/17/18  0920   INR 0.93 0.97 0.90           ASSESSMENT:       Diagnoses        Codes Comments     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       DDD (degenerative disc disease), lumbar     M51.36       Chronic pain syndrome     G89.4       Chronic sacroiliac joint pain     M53.3, G89.29       Cervical facet joint syndrome     M47.812       Chronic right shoulder pain     M25.511, G89.29       CYP2D6 rapid metabolizer (H)     E88.89       Tom-Danlos syndrome     Q79.60       Bilateral  acromioclavicular joint arthritis     M19.011, M19.012       Opioid contract exists     Z79.891       Intractable chronic migraine without aura and without status migrainosus           Cervical radiculopathy      PLAN:       Continue Butrans 10 mics per hour.  4 patches per month    Tramadol 50 mg daily as needed.  60 tablets dispensed.    Recommend over-the-counter lidocaine 4% patches for the most painful spots on lower back every 12 hours    Continue using medical cannabis    Start methocarbamol 500 mg 4 times daily as needed.  120 tablets dispensed    Sumatriptan/rizatriptan for migraine headache as needed.  Prescribe ER provider    Amitriptyline 25 to 50 mg nightly.    Ordered cervical epidural steroid injection.  Risk of the procedure including bleeding, infection, failed procedure, nerve injury, paralysis discussed with her.  All questions were answered.     Follow-up 6 months      Assessment will be ongoing with changes in treatment as indicated.  Benefits/risks/alternatives to treatment have been reviewed and the patient has been instructed to contact this office if they have any questions or concerns.  This plan of care has been discussed with the patient and the patient is in agreement.     Faiza Martinez MD, PHD      )    Answers for HPI/ROS submitted by the patient on 10/12/2022  General Symptoms: Yes  Skin Symptoms: Yes  HENT Symptoms: Yes  EYE SYMPTOMS: Yes  HEART SYMPTOMS: Yes  LUNG SYMPTOMS: Yes  INTESTINAL SYMPTOMS: Yes  URINARY SYMPTOMS: Yes  GYNECOLOGIC SYMPTOMS: No  BREAST SYMPTOMS: No  SKELETAL SYMPTOMS: Yes  BLOOD SYMPTOMS: Yes  NERVOUS SYSTEM SYMPTOMS: Yes  MENTAL HEALTH SYMPTOMS: No  Ear pain: No  Ear discharge: No  Hearing loss: Yes  Tinnitus: Yes  Nosebleeds: No  Congestion: Yes  Sinus pain: Yes  Trouble swallowing: Yes   Voice hoarseness: Yes  Mouth sores: No  Sore throat: No  Tooth pain: No  Gum tenderness: No  Bleeding gums: No  Change in taste: No  Change in sense of smell: No  Dry  mouth: Yes  Hearing aid used: No  Neck lump: Yes  Fever: No  Loss of appetite: No  Weight loss: Yes  Weight gain: No  Fatigue: Yes  Night sweats: Yes  Chills: Yes  Increased stress: No  Excessive hunger: No  Excessive thirst: Yes  Feeling hot or cold when others believe the temperature is normal: Yes  Loss of height: No  Post-operative complications: Yes  Surgical site pain: Yes  Hallucinations: No  Change in or Loss of Energy: Yes  Hyperactivity: No  Confusion: No  Changes in hair: No  Changes in moles/birth marks: No  Itching: Yes  Rashes: No  Changes in nails: Yes  Acne: No  Hair in places you don't want it: No  Change in facial hair: No  Warts: No  Non-healing sores: Yes  Scarring: Yes  Flaking of skin: Yes  Color changes of hands/feet in cold : Yes  Sun sensitivity: No  Skin thickening: Yes  Eye pain: Yes  Vision loss: Yes  Dry eyes: Yes  Watery eyes: Yes  Eye bulging: No  Double vision: Yes  Flashing of lights: Yes  Spots: Yes  Floaters: Yes  Redness: Yes  Crossed eyes: No  Tunnel Vision: Yes  Yellowing of eyes: No  Eye irritation: Yes  Chest pain or pressure: Yes  Fast or irregular heartbeat: Yes  Pain in legs with walking: Yes  Trouble breathing while lying down: Yes  Fingers or toes appear blue: Yes  High blood pressure: No  Low blood pressure: Yes  Fainting: No  Murmurs: No  Pacemaker: No  Varicose veins: No  Wake up at night with shortness of breath: Yes  Light-headedness: Yes  Exercise intolerance: Yes  Cough: No  Sputum or phlegm: No  Coughing up blood: No  Difficulty breating or shortness of breath: Yes  Snoring: Yes  Wheezing: No  Difficulty breathing on exertion: Yes  Nighttime Cough: No  Difficulty breathing when lying flat: Yes  Heart burn or indigestion: No  Nausea: Yes  Vomiting: No  Abdominal pain: Yes  Bloating: Yes  Constipation: No  Diarrhea: Yes  Blood in stool: No  Black stools: No  Rectal or Anal pain: No  Fecal incontinence: No  Yellowing of skin or eyes: No  Vomit with blood: No  Change  in stools: Yes  Trouble holding urine or incontinence: Yes  Pain or burning: No  Trouble starting or stopping: Yes  Increased frequency of urination: Yes  Blood in urine: No  Decreased frequency of urination: No  Frequent nighttime urination: Yes  Flank pain: Yes  Difficulty emptying bladder: Yes  Back pain: Yes  Muscle aches: Yes  Neck pain: Yes  Swollen joints: Yes  Joint pain: Yes  Bone pain: Yes  Muscle cramps: Yes  Muscle weakness: Yes  Joint stiffness: Yes  Bone fracture: No  Edema or swelling: Yes  Anemia: Yes  Swollen glands: Yes  Easy bleeding or bruising: Yes  Trouble with coordination: Yes  Dizziness or trouble with balance: Yes  Fainting or black-out spells: Yes  Memory loss: Yes  Headache: Yes  Seizures: No  Speech problems: No  Tingling: Yes  Tremor: No  Weakness: Yes  Difficulty walking: No  Paralysis: No  Numbness: Yes

## 2022-10-13 NOTE — PROGRESS NOTES
[facility] : Progress Note    Date of visit: 10/13/2022    Interval history:  Ada Welch is a 51 year old female who  is well known to me for multifocal chronic pain. The patient has  complex history of low back pain for ~25 years for which she underwent L4-S1 fusion (TLIF) 12/9/13.  Postoperatively, her back pain worsened (mainly LBP, but also radiated down both lower limbs in an S1 dermatomal distribution).   Underwent removal of posterior instrumentation (screws and guadalupe).  After fusion removal, her pain continued.  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  She even sought care from a EDS specialist in [place].  She is currently on disability.     She has a h/o significant autonomic dysfunction.  Since 2014, she reports that her heart rate has fluctuated from as low as  beats per minutes and her blood pressure also fluctuates at the same time without any aggravating factors.  The fluctuation of the blood pressure and heart rate happens when she is supine, sitting, standing, walking, etc.  She has seen cardiologist Dr. Russell who advised her to continue fludrocortisone, isometric exercise, low carbohydrate and low gluten diet.     She has been seeing Dr. Singh for urinary urgency and incontinence.  She is advised for improving lifestyle, dietary modification, optimizing bowel regimen and to start pelvic physical therapy.     She has seen Dr. Moncada for left upper extremity radicular pain along the C8 nerve root distribution.  Per Dr. Moncada's note, she does not have a surgical target which may explain her symptoms.  She still continues to report this symptom.  We discussed about epidural steroid injection, however she is reluctant to perform the procedures and states she previously had a bad reaction with a steroid.   Patient also had cervical medial branch nerve block x2 with us 50% pain relief.   However medial branch  nerve radiofrequency ablation was not approved by the insurance.     The patient has seen Dr. Mack for eosinophilic esophagitis, GERD, and dysphagia.    She had open hernia surgery by Dr. Mack in 2020.  Her postoperative course was uneventful.        The patient has been seeing Dr. Noemy Nguyen for chronic migraines. Patient reports that her chronic intractable headache pain recently got worse. 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  She has undergone multiple blood patches intermittently for her headache pain.    Recently saw Dr. Enciso for lower back pain secondary to persistent spinal pain after L4-S1 fusion.      2013 - L4-S1 fusion, TLIF (Dr. Williamson in [place], [place])    - removal of posterior instrumentation (screws and guadalupe) with exploration of fusion (Dr. MIKE Lucero ([facility]))    She was recommended by Dr. Enciso for nonoperative treatment including epidural steroid injection,  medial branch nerve block/radiofrequency ablation of the lumbar MBB, and/or SI joint injections.  If these injections are proven to be ineffective she may need to extend her fusion up to L3-L4.    Currently she has been  managing her pain with stable dose of Butrans 10 mcg per hour q 7 days, tramadol 50 mg every 6 hours as needed which she refills every 2-3 months.  In addition she takes tizanidine 4 mg 4 times daily as needed.    She takes sumatriptan for breakthrough migraine.  Occasionally she takes oxycodone  when her pain gets significantly worse.      Today her main complaints are followin.    She is interested in cervical epidural steroid injection for neck pain radiating to the bilateral upper extremities.  Her MRI of the cervical spine performed in July this year showed moderate neuroforaminal stenosis at C5-C6 and C6-C7.    2.  She is interested in injections in the lower lumbar spine.  In the past, did not have good results from lumbar epidural steroid injection.  However she is  to give it another try.  Recommend lumbar L3-L4 epidural steroid injection followed by diagnostic lumbar MBB.     3.  Currently managing pain with Butrans patch 10 mics per hour.  She states that her pain is not adequately controlled with prn tramadol and oxycodone.  Previously she took hydromorphone.    Today I refilled her Butrans and tramadol.  In addition I gave her a prescription of amitriptyline 25 to 50 mg nightly.      [place] Prescription Monitoring Program:   Reviewed. No concerns     Review of Systems:  The 14 system ROS was reviewed and was negative except what is documented above and as follows.  Any bowel or bladder problems: none  Mood: okay    Physical Exam:  Vitals:    10/13/22 1028   BP: 127/84   BP Location: Right arm   Patient Position: Chair   Cuff Size: Adult Regular   Pulse: 62   SpO2: 99%       Medications:  Current Outpatient Medications   Medication Sig Dispense Refill     amitriptyline (ELAVIL) 25 MG tablet Take 1 tablet (25 mg) by mouth At Bedtime 30 tablet 0     buprenorphine (BUTRANS) 10 MCG/HR WK patch Place 1 patch onto the skin every 7 days 4 patch 1     traMADol (ULTRAM) 50 MG tablet Take 1 tablet (50 mg) by mouth every 6 hours as needed for severe pain 60 tablet 1     acetaminophen (TYLENOL) 500 MG tablet Take 1,000 mg by mouth every 6 hours as needed for mild pain       cetirizine (ZYRTEC) 10 MG tablet Take 10 mg by mouth daily as needed        co-enzyme Q-10 100 MG CAPS capsule Take 100 mg by mouth daily       doxepin (SILENOR) 3 MG tablet Take 1 tablet (3 mg) by mouth nightly as needed for sleep 30 tablet 1     DULoxetine (CYMBALTA) 30 MG capsule Take 60mg in the morning and 30mg at bedtime 270 capsule 1     EPINEPHrine (EPIPEN/ADRENACLICK/OR ANY BX GENERIC EQUIV) 0.3 MG/0.3ML injection 2-pack INJECT 0.3 MLS (0.3 MG) INTO THE MUSCLE AS NEEDED FOR ANAPHYLAXIS  1     ferrous fumarate 65 mg, [place]. FE,-Vitamin C 125 mg (VITRON-C)  MG TABS tablet Take 1 tablet by mouth every  other day 60 tablet 4     fluticasone (FLONASE) 50 MCG/ACT nasal spray Spray 2 sprays into both nostrils daily Call clinic to schedule follow up appointment. 48 mL 11     HYDROmorphone (DILAUDID) 2 MG tablet Take 2 tablets (4 mg) by mouth every 6 hours as needed for pain 27 tablet 0     ivabradine (CORLANOR) 7.5 MG tablet Take 1 tablet (7.5 mg) by mouth 2 times daily 180 tablet 3     Lidocaine (LIDOCARE) 4 % Patch Place 1-3 patches onto the skin every 24 hours To prevent lidocaine toxicity, patient should be patch free for 12 hrs daily. 20 patch 0     linaclotide (LINZESS) 145 MCG capsule Take 1 capsule (145 mcg) by mouth every morning (before breakfast) 30 capsule 11     lubiprostone (AMITIZA) 8 MCG capsule Take 2 capsules (16 mcg) by mouth 2 times daily 120 capsule 1     medical cannabis (Patient's own supply.  Not a prescription) Take 1 Dose by mouth See Admin Instructions Capsule formulation - uses as needed       metoclopramide (REGLAN) 10 MG tablet Take 1 tablet (10 mg) by mouth 4 times daily as needed (headache) 40 tablet 3     Multiple Vitamins-Minerals (MULTIVITAMIN PO) Take 1 tablet by mouth daily        naloxone (NARCAN) 4 MG/0.1ML nasal spray Spray 1 spray (4 mg) into one nostril alternating nostrils once as needed for opioid reversal every 2-3 minutes until assistance arrives 0.2 mL 0     naproxen sodium (ANAPROX) 220 MG tablet Take 440-660 mg by mouth daily as needed for moderate pain       Probiotic Product (PROBIOTIC DAILY PO) Take 2 packets by mouth every morning        propranolol ER (INDERAL LA) 120 MG 24 hr capsule Take 1 capsule (120 mg) by mouth three times a week 39 capsule 3     Rhubarb (ESTROVEN COMPLETE) 4 MG TABS Take 1 tablet by mouth       rizatriptan (MAXALT-MLT) 5 MG ODT TAKE 1-2 TABLETS (5-10 MG) BY MOUTH AT ONSET OF HEADACHE FOR MIGRAINE (MAX 30 MG IN 24 HOURS) 18 tablet 11     SUMAtriptan (IMITREX STATDOSE) 6 MG/0.5ML pen injector kit Inject 0.5 ml (6 mg) subcutaneously at onset of  migraine, May repeat in 1 hour , Max 12 mg/24 hrs 2 kit 11     Suvorexant (BELSOMRA) 5 MG tablet Take 1-2 tablets (5-10 mg) by mouth nightly as needed for sleep May cause sedation, use with caution with pain meds or sedatives. 30 tablet 1     tiZANidine (ZANAFLEX) 4 MG tablet Take 1 tablet (4 mg) by mouth 4 times daily as needed for muscle spasms 120 tablet 1     vitamin B complex with vitamin C (STRESS TAB) tablet Take 1 tablet by mouth daily       VITAMIN D, CHOLECALCIFEROL, PO Take 2,000 Units by mouth daily         Analgesic Medications:   Medications related to Pain Management (From now, onward)    Start     Dose/Rate Route Frequency Ordered Stop    10/13/22 0000  buprenorphine (BUTRANS) 10 MCG/HR WK patch         1 patch Transdermal EVERY 7 DAYS 10/13/22 1043      10/13/22 0000  traMADol (ULTRAM) 50 MG tablet         50 mg Oral EVERY 6 HOURS PRN 10/13/22 1043      10/13/22 0000  amitriptyline (ELAVIL) 25 MG tablet         25 mg Oral AT BEDTIME 10/13/22 1047               LABORATORY VALUES:   Recent Labs   Lab Test 11/13/20  1133 02/12/20  0611    139   POTASSIUM 4.5 3.6   CHLORIDE 102 106   CO2 29 27   ANIONGAP 4 6   GLC 96 106*   BUN 10 6*   CR 0.83 0.62   JUAN 8.6 8.0*       CBC RESULTS:   Recent Labs   Lab Test 06/24/22  1122   WBC 6.5   RBC 4.74   HGB 14.8   HCT 43.5   MCV 92   MCH 31.2   MCHC 34.0   RDW 12.1          Most Recent 3 INR's:  Recent Labs   Lab Test 01/30/20  1325 02/28/19  1045 12/17/18  0920   INR 0.93 0.97 0.90           ASSESSMENT:       Diagnoses        Codes Comments     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       DDD (degenerative disc disease), lumbar     M51.36       Chronic pain syndrome     G89.4       Chronic sacroiliac joint pain     M53.3, G89.29       Cervical facet joint syndrome     M47.812       Chronic right shoulder pain     M25.511, G89.29       CYP2D6 rapid metabolizer (H)     E88.89       Tom-Danlos syndrome     Q79.60       Bilateral  acromioclavicular joint arthritis     M19.011, M19.012       Opioid contract exists     Z79.891       Intractable chronic migraine without aura and without status migrainosus           Cervical radiculopathy      PLAN:       Continue Butrans 10 mics per hour.  4 patches per month    Tramadol 50 mg daily as needed.  60 tablets dispensed.    Recommend over-the-counter lidocaine 4% patches for the most painful spots on lower back every 12 hours    Continue using medical cannabis    Start methocarbamol 500 mg 4 times daily as needed.  120 tablets dispensed    Sumatriptan/rizatriptan for migraine headache as needed.  Prescribe ER provider    Amitriptyline 25 to 50 mg nightly.    Ordered cervical epidural steroid injection.  Risk of the procedure including bleeding, infection, failed procedure, nerve injury, paralysis discussed with her.  All questions were answered.     Follow-up 6 months      Assessment will be ongoing with changes in treatment as indicated.  Benefits/risks/alternatives to treatment have been reviewed and the patient has been instructed to contact this office if they have any questions or concerns.  This plan of care has been discussed with the patient and the patient is in agreement.     Faiza Martinez MD, PHD      )    Answers for HPI/ROS submitted by the patient on 10/12/2022  General Symptoms: Yes  Skin Symptoms: Yes  HENT Symptoms: Yes  EYE SYMPTOMS: Yes  HEART SYMPTOMS: Yes  LUNG SYMPTOMS: Yes  INTESTINAL SYMPTOMS: Yes  URINARY SYMPTOMS: Yes  GYNECOLOGIC SYMPTOMS: No  BREAST SYMPTOMS: No  SKELETAL SYMPTOMS: Yes  BLOOD SYMPTOMS: Yes  NERVOUS SYSTEM SYMPTOMS: Yes  MENTAL HEALTH SYMPTOMS: No  Ear pain: No  Ear discharge: No  Hearing loss: Yes  Tinnitus: Yes  Nosebleeds: No  Congestion: Yes  Sinus pain: Yes  Trouble swallowing: Yes   Voice hoarseness: Yes  Mouth sores: No  Sore throat: No  Tooth pain: No  Gum tenderness: No  Bleeding gums: No  Change in taste: No  Change in sense of smell: No  Dry  mouth: Yes  Hearing aid used: No  Neck lump: Yes  Fever: No  Loss of appetite: No  Weight loss: Yes  Weight gain: No  Fatigue: Yes  Night sweats: Yes  Chills: Yes  Increased stress: No  Excessive hunger: No  Excessive thirst: Yes  Feeling hot or cold when others believe the temperature is normal: Yes  Loss of height: No  Post-operative complications: Yes  Surgical site pain: Yes  Hallucinations: No  Change in or Loss of Energy: Yes  Hyperactivity: No  Confusion: No  Changes in hair: No  Changes in moles/birth marks: No  Itching: Yes  Rashes: No  Changes in nails: Yes  Acne: No  Hair in places you don't want it: No  Change in facial hair: No  Warts: No  Non-healing sores: Yes  Scarring: Yes  Flaking of skin: Yes  Color changes of hands/feet in cold : Yes  Sun sensitivity: No  Skin thickening: Yes  Eye pain: Yes  Vision loss: Yes  Dry eyes: Yes  Watery eyes: Yes  Eye bulging: No  Double vision: Yes  Flashing of lights: Yes  Spots: Yes  Floaters: Yes  Redness: Yes  Crossed eyes: No  Tunnel Vision: Yes  Yellowing of eyes: No  Eye irritation: Yes  Chest pain or pressure: Yes  Fast or irregular heartbeat: Yes  Pain in legs with walking: Yes  Trouble breathing while lying down: Yes  Fingers or toes appear blue: Yes  High blood pressure: No  Low blood pressure: Yes  Fainting: No  Murmurs: No  Pacemaker: No  Varicose veins: No  Wake up at night with shortness of breath: Yes  Light-headedness: Yes  Exercise intolerance: Yes  Cough: No  Sputum or phlegm: No  Coughing up blood: No  Difficulty breating or shortness of breath: Yes  Snoring: Yes  Wheezing: No  Difficulty breathing on exertion: Yes  Nighttime Cough: No  Difficulty breathing when lying flat: Yes  Heart burn or indigestion: No  Nausea: Yes  Vomiting: No  Abdominal pain: Yes  Bloating: Yes  Constipation: No  Diarrhea: Yes  Blood in stool: No  Black stools: No  Rectal or Anal pain: No  Fecal incontinence: No  Yellowing of skin or eyes: No  Vomit with blood: No  Change  in stools: Yes  Trouble holding urine or incontinence: Yes  Pain or burning: No  Trouble starting or stopping: Yes  Increased frequency of urination: Yes  Blood in urine: No  Decreased frequency of urination: No  Frequent nighttime urination: Yes  Flank pain: Yes  Difficulty emptying bladder: Yes  Back pain: Yes  Muscle aches: Yes  Neck pain: Yes  Swollen joints: Yes  Joint pain: Yes  Bone pain: Yes  Muscle cramps: Yes  Muscle weakness: Yes  Joint stiffness: Yes  Bone fracture: No  Edema or swelling: Yes  Anemia: Yes  Swollen glands: Yes  Easy bleeding or bruising: Yes  Trouble with coordination: Yes  Dizziness or trouble with balance: Yes  Fainting or black-out spells: Yes  Memory loss: Yes  Headache: Yes  Seizures: No  Speech problems: No  Tingling: Yes  Tremor: No  Weakness: Yes  Difficulty walking: No  Paralysis: No  Numbness: Yes

## 2022-10-14 ENCOUNTER — ANESTHESIA (OUTPATIENT)
Dept: SURGERY | Facility: AMBULATORY SURGERY CENTER | Age: 52
End: 2022-10-14
Payer: COMMERCIAL

## 2022-10-14 ENCOUNTER — HOSPITAL ENCOUNTER (OUTPATIENT)
Facility: AMBULATORY SURGERY CENTER | Age: 52
Discharge: HOME | End: 2022-10-14
Attending: INTERNAL MEDICINE
Payer: COMMERCIAL

## 2022-10-14 VITALS
SYSTOLIC BLOOD PRESSURE: 102 MMHG | RESPIRATION RATE: 16 BRPM | HEART RATE: 60 BPM | OXYGEN SATURATION: 98 % | WEIGHT: 170 LBS | DIASTOLIC BLOOD PRESSURE: 63 MMHG | TEMPERATURE: 97.2 F | HEIGHT: 67 IN | BODY MASS INDEX: 26.68 KG/M2

## 2022-10-14 VITALS — HEART RATE: 59 BPM

## 2022-10-14 LAB — UPPER GI ENDOSCOPY: NORMAL

## 2022-10-14 PROCEDURE — 43239 EGD BIOPSY SINGLE/MULTIPLE: CPT

## 2022-10-14 PROCEDURE — 88305 TISSUE EXAM BY PATHOLOGIST: CPT | Mod: TC | Performed by: INTERNAL MEDICINE

## 2022-10-14 RX ORDER — ONDANSETRON 4 MG/1
4 TABLET, ORALLY DISINTEGRATING ORAL EVERY 6 HOURS PRN
Status: CANCELLED | OUTPATIENT
Start: 2022-10-14

## 2022-10-14 RX ORDER — PROPOFOL 10 MG/ML
INJECTION, EMULSION INTRAVENOUS CONTINUOUS PRN
Status: DISCONTINUED | OUTPATIENT
Start: 2022-10-14 | End: 2022-10-14

## 2022-10-14 RX ORDER — FENTANYL CITRATE 50 UG/ML
25 INJECTION, SOLUTION INTRAMUSCULAR; INTRAVENOUS
Status: DISCONTINUED | OUTPATIENT
Start: 2022-10-14 | End: 2022-10-15 | Stop reason: HOSPADM

## 2022-10-14 RX ORDER — SODIUM CHLORIDE, SODIUM LACTATE, POTASSIUM CHLORIDE, CALCIUM CHLORIDE 600; 310; 30; 20 MG/100ML; MG/100ML; MG/100ML; MG/100ML
INJECTION, SOLUTION INTRAVENOUS CONTINUOUS
Status: DISCONTINUED | OUTPATIENT
Start: 2022-10-14 | End: 2022-10-15 | Stop reason: HOSPADM

## 2022-10-14 RX ORDER — ONDANSETRON 2 MG/ML
4 INJECTION INTRAMUSCULAR; INTRAVENOUS EVERY 6 HOURS PRN
Status: CANCELLED | OUTPATIENT
Start: 2022-10-14

## 2022-10-14 RX ORDER — LIDOCAINE 40 MG/G
CREAM TOPICAL
Status: DISCONTINUED | OUTPATIENT
Start: 2022-10-14 | End: 2022-10-15 | Stop reason: HOSPADM

## 2022-10-14 RX ORDER — ONDANSETRON 4 MG/1
4 TABLET, ORALLY DISINTEGRATING ORAL EVERY 30 MIN PRN
Status: DISCONTINUED | OUTPATIENT
Start: 2022-10-14 | End: 2022-10-15 | Stop reason: HOSPADM

## 2022-10-14 RX ORDER — ONDANSETRON 2 MG/ML
4 INJECTION INTRAMUSCULAR; INTRAVENOUS
Status: DISCONTINUED | OUTPATIENT
Start: 2022-10-14 | End: 2022-10-15 | Stop reason: HOSPADM

## 2022-10-14 RX ORDER — NALOXONE HYDROCHLORIDE 0.4 MG/ML
0.2 INJECTION, SOLUTION INTRAMUSCULAR; INTRAVENOUS; SUBCUTANEOUS
Status: CANCELLED | OUTPATIENT
Start: 2022-10-14

## 2022-10-14 RX ORDER — LIDOCAINE HYDROCHLORIDE 20 MG/ML
INJECTION, SOLUTION INFILTRATION; PERINEURAL PRN
Status: DISCONTINUED | OUTPATIENT
Start: 2022-10-14 | End: 2022-10-14

## 2022-10-14 RX ORDER — NALOXONE HYDROCHLORIDE 0.4 MG/ML
0.4 INJECTION, SOLUTION INTRAMUSCULAR; INTRAVENOUS; SUBCUTANEOUS
Status: CANCELLED | OUTPATIENT
Start: 2022-10-14

## 2022-10-14 RX ORDER — FENTANYL CITRATE 50 UG/ML
25 INJECTION, SOLUTION INTRAMUSCULAR; INTRAVENOUS EVERY 5 MIN PRN
Status: DISCONTINUED | OUTPATIENT
Start: 2022-10-14 | End: 2022-10-15 | Stop reason: HOSPADM

## 2022-10-14 RX ORDER — GLYCOPYRROLATE 0.2 MG/ML
INJECTION, SOLUTION INTRAMUSCULAR; INTRAVENOUS PRN
Status: DISCONTINUED | OUTPATIENT
Start: 2022-10-14 | End: 2022-10-14

## 2022-10-14 RX ORDER — OXYCODONE HYDROCHLORIDE 5 MG/1
5 TABLET ORAL EVERY 4 HOURS PRN
Status: DISCONTINUED | OUTPATIENT
Start: 2022-10-14 | End: 2022-10-15 | Stop reason: HOSPADM

## 2022-10-14 RX ORDER — MEPERIDINE HYDROCHLORIDE 25 MG/ML
12.5 INJECTION INTRAMUSCULAR; INTRAVENOUS; SUBCUTANEOUS
Status: DISCONTINUED | OUTPATIENT
Start: 2022-10-14 | End: 2022-10-15 | Stop reason: HOSPADM

## 2022-10-14 RX ORDER — PROCHLORPERAZINE MALEATE 10 MG
10 TABLET ORAL EVERY 6 HOURS PRN
Status: CANCELLED | OUTPATIENT
Start: 2022-10-14

## 2022-10-14 RX ORDER — ACETAMINOPHEN 325 MG/1
975 TABLET ORAL ONCE
Status: DISCONTINUED | OUTPATIENT
Start: 2022-10-14 | End: 2022-10-15 | Stop reason: HOSPADM

## 2022-10-14 RX ORDER — PROPOFOL 10 MG/ML
INJECTION, EMULSION INTRAVENOUS PRN
Status: DISCONTINUED | OUTPATIENT
Start: 2022-10-14 | End: 2022-10-14

## 2022-10-14 RX ORDER — HYDROMORPHONE HYDROCHLORIDE 1 MG/ML
0.2 INJECTION, SOLUTION INTRAMUSCULAR; INTRAVENOUS; SUBCUTANEOUS EVERY 5 MIN PRN
Status: DISCONTINUED | OUTPATIENT
Start: 2022-10-14 | End: 2022-10-15 | Stop reason: HOSPADM

## 2022-10-14 RX ORDER — ONDANSETRON 2 MG/ML
4 INJECTION INTRAMUSCULAR; INTRAVENOUS EVERY 30 MIN PRN
Status: DISCONTINUED | OUTPATIENT
Start: 2022-10-14 | End: 2022-10-15 | Stop reason: HOSPADM

## 2022-10-14 RX ORDER — FLUMAZENIL 0.1 MG/ML
0.2 INJECTION, SOLUTION INTRAVENOUS
Status: CANCELLED | OUTPATIENT
Start: 2022-10-14 | End: 2022-10-14

## 2022-10-14 RX ADMIN — PROPOFOL 30 MG: 10 INJECTION, EMULSION INTRAVENOUS at 11:06

## 2022-10-14 RX ADMIN — PROPOFOL 150 MCG/KG/MIN: 10 INJECTION, EMULSION INTRAVENOUS at 11:00

## 2022-10-14 RX ADMIN — PROPOFOL 40 MG: 10 INJECTION, EMULSION INTRAVENOUS at 11:03

## 2022-10-14 RX ADMIN — LIDOCAINE HYDROCHLORIDE 80 MG: 20 INJECTION, SOLUTION INFILTRATION; PERINEURAL at 10:59

## 2022-10-14 RX ADMIN — GLYCOPYRROLATE 0.2 MG: 0.2 INJECTION, SOLUTION INTRAMUSCULAR; INTRAVENOUS at 10:59

## 2022-10-14 RX ADMIN — SODIUM CHLORIDE, SODIUM LACTATE, POTASSIUM CHLORIDE, CALCIUM CHLORIDE: 600; 310; 30; 20 INJECTION, SOLUTION INTRAVENOUS at 10:57

## 2022-10-14 NOTE — ANESTHESIA CARE TRANSFER NOTE
Patient: Ada Welch    Procedure: Procedure(s):  ESOPHAGOGASTRODUODENOSCOPY, WITH BIOPSY       Diagnosis: Hiatal hernia [K44.9]  Diagnosis Additional Information: No value filed.    Anesthesia Type:   MAC     Note:    Oropharynx: oropharynx clear of all foreign objects and spontaneously breathing  Level of Consciousness: awake  Oxygen Supplementation: room air    Independent Airway: airway patency satisfactory and stable  Dentition: dentition unchanged  Vital Signs Stable: post-procedure vital signs reviewed and stable  Report to RN Given: handoff report given  Patient transferred to: Phase II    Handoff Report: Identifed the Patient, Identified the Reponsible Provider, Reviewed the pertinent medical history, Discussed the surgical course, Reviewed Intra-OP anesthesia mangement and issues during anesthesia, Set expectations for post-procedure period and Allowed opportunity for questions and acknowledgement of understanding      Vitals:  Vitals Value Taken Time   BP 95/55 10/14/22 1115   Temp 36.2  C (97.2  F) 10/14/22 1115   Pulse 58 10/14/22 1115   Resp 16 10/14/22 1115   SpO2 97 % 10/14/22 1115       Electronically Signed By: FIDELINA Durham CRNA  October 14, 2022  11:18 AM

## 2022-10-14 NOTE — ANESTHESIA PREPROCEDURE EVALUATION
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     Cervicalgia      Chronic sinusitis     Diagnosed with chronic pansinusitis 2016     Coagulation disorder (H)     factor 8     CSF leak     Chiari malformation     Depression      Tom-Danlos disease      Eosinophilic esophagitis 2018     Gastroesophageal reflux disease 3/1/2017    I have large hiatal hernia     Hiatal hernia     Have adeline hiatel hernia     Hoarseness Unsure    It comes and goes     Hypertension      Hypothyroid      IBS (irritable bowel syndrome) with constipation     improved on Linzess     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     Irregular heart beat      Migraines 2007    Unsure of exact date     Nasal polyps     Were revoved 2017, benign     Orthostatic hypotension      Other nervous system complications 2014    Autonomic nervous system disorder, neuralgia, neuropathy     Swelling, mass, or lump in head and neck 2016    Lymph node has been growing for last year     Thyroid disease 2008     Urinary incontinence      Urinary urgency       Past Surgical History:   Procedure Laterality Date     AS REPAIR OF NASAL SEPTUM  2009    repair broke nose     BACK SURGERY  2013  10/01/2014    Spinal fusion L4-S1, L5 moving 5/8ths in. Remove screws/rods     BIOPSY  2008 & 3/2017    mole biopsy, lymph node biopsy     COLONOSCOPY  3/2017    Colonoscopy and GI     ESOPHAGEAL IMPEDENCE FUNCTION TEST WITH 24 HOUR PH  GREATER THAN 1 HOUR N/A 9/17/2019    Procedure: IMPEDANCE PH STUDY, ESOPHAGUS, 24 HOUR;  Surgeon: Raghavendra Grande MD;  Location:  GI     ESOPHAGOSCOPY, GASTROSCOPY, DUODENOSCOPY (EGD), COMBINED N/A 8/3/2018    Procedure: COMBINED ESOPHAGOSCOPY, GASTROSCOPY, DUODENOSCOPY (EGD), BIOPSY SINGLE OR MULTIPLE;;  Surgeon: uJan Woodson MD;  Location:  GI     ESOPHAGOSCOPY, GASTROSCOPY, DUODENOSCOPY (EGD), COMBINED N/A 10/22/2018    Procedure: COMBINED ESOPHAGOSCOPY, GASTROSCOPY, DUODENOSCOPY (EGD), BIOPSY SINGLE OR MULTIPLE;  Surgeon: Sadia Carolina MD;  Location:  GI     ESOPHAGOSCOPY, GASTROSCOPY, DUODENOSCOPY (EGD), COMBINED N/A 12/17/2018    Procedure: COMBINED ESOPHAGOSCOPY, GASTROSCOPY, DUODENOSCOPY (EGD);  Surgeon: Juan Woodson MD;  Location:  GI     INJECT BLOCK MEDIAL BRANCH CERVICAL/THORACIC/LUMBAR Left 9/24/2020    Procedure: BLOCK, NERVE, FACET JOINT, MEDIAL BRANCH, DIAGNOSTIC;  Surgeon: Faiza Martinez MD;  Location: [facility]     INJECT BLOCK MEDIAL BRANCH CERVICAL/THORACIC/LUMBAR Bilateral 10/8/2020    Procedure: Bilateral cervical 3, cervical 4, and cervical 5 medial branch nerve block;  Surgeon: Faiza Martinez MD;  Location: [facility] OR     INJECT BLOCK MEDIAL BRANCH CERVICAL/THORACIC/LUMBAR Right 3/4/2021    Procedure: Cervical 3, cervical 4, cervical 5 medial branch nerve blocks to target C3-C4 and C4-C5 facet joint;  Surgeon: Faiza Martinez MD;  Location: [facility] OR     LAPAROSCOPIC HERNIORRHAPHY HIATAL N/A 2/10/2020    Procedure: LAPAROSCOPIC HIATAL HERNIA REPAIR, NISSEN FUNDOPLICATION, ESOPHOGASTRODUODENOSCOPY, PEG TUBE PLACEMENT.;  Surgeon: Alana Mack MD;  Location:  OR     NASAL/SINUS POLYPECTOMY  2017    Polyps were benign     NOSE SURGERY  2007    deviated septum     TONSILLECTOMY & ADENOIDECTOMY  1981     TUBAL LIGATION  07/01/2011      Allergies   Allergen Reactions     Baclofen Other (See Comments)     Loss of muscle tone. Muscle weakness.     Bee Venom Shortness Of Breath,  Palpitations, Anaphylaxis and Difficulty breathing     Patient does carry Epi-Pen     Chicken-Derived Products (Egg) Nausea and Diarrhea     Compazine [Prochlorperazine]      Extreme jittery     Food      Milk     Gabapentin      Dizzy     Gluten Meal GI Disturbance     Wheat bran and wheat     Pregabalin      Seasonal Allergies      Dust, mold     Topiramate      Pt does not remember reaction      Social History     Tobacco Use     Smoking status: Former     Packs/day: 0.20     Years: 10.00     Pack years: 2.00     Types: Cigarettes     Start date: 1991     Quit date: 2013     Years since quittin.8     Smokeless tobacco: Never   Substance Use Topics     Alcohol use: Yes     Comment: once a month      Wt Readings from Last 1 Encounters:   10/14/22 77.1 kg (170 lb)        Anesthesia Evaluation            ROS/MED HX  ENT/Pulmonary:     (+) allergic rhinitis,     Neurologic:     (+) peripheral neuropathy, - lumbar.     Cardiovascular:     (+) hypertension-----Irregular Heartbeat/Palpitations,     METS/Exercise Tolerance:     Hematologic:       Musculoskeletal:       GI/Hepatic:     (+) GERD, Symptomatic, esophageal disease, Stricture, hiatal hernia, liver disease,     Renal/Genitourinary:       Endo:     (+) thyroid problem, hypothyroidism, Obesity,     Psychiatric/Substance Use:     (+) psychiatric history anxiety and depression     Infectious Disease:       Malignancy:       Other:               OUTSIDE LABS:  CBC:   Lab Results   Component Value Date    WBC 6.5 2022    WBC 6.2 2020    HGB 14.8 2022    HGB 12.1 2020    HCT 43.5 2022    HCT 38.3 2020     2022     2020     BMP:   Lab Results   Component Value Date     2020     2020    POTASSIUM 4.5 2020    POTASSIUM 3.6 2020    CHLORIDE 102 2020    CHLORIDE 106 2020    CO2 29 2020    CO2 27 2020    BUN 10 2020    BUN 6 (L)  02/12/2020    CR 0.83 11/13/2020    CR 0.62 02/12/2020    GLC 96 11/13/2020     (H) 02/12/2020     COAGS:   Lab Results   Component Value Date    PTT 29 01/30/2020    INR 0.93 01/30/2020     POC:   Lab Results   Component Value Date     (H) 02/11/2020    HCG Negative 03/04/2021     HEPATIC:   Lab Results   Component Value Date    ALBUMIN 3.9 11/13/2020    PROTTOTAL 7.1 11/13/2020    ALT 23 11/13/2020    AST 16 11/13/2020    ALKPHOS 94 11/13/2020    BILITOTAL 0.6 11/13/2020     OTHER:   Lab Results   Component Value Date    JUAN 8.6 11/13/2020    MAG 2.2 02/11/2020    LIPASE 86 12/17/2018    TSH 1.34 03/10/2022    CRP <2.9 11/13/2020    SED 8 10/05/2017       Anesthesia Plan    ASA Status:  2      Anesthesia Type: MAC.     - Reason for MAC: immobility needed              Consents    Anesthesia Plan(s) and associated risks, benefits, and realistic alternatives discussed. Questions answered and patient/representative(s) expressed understanding.     - Discussed: Risks, Benefits and Alternatives for BOTH SEDATION and the PROCEDURE were discussed     - Discussed with:  Patient      - Extended Intubation/Ventilatory Support Discussed: No.      - Patient is DNR/DNI Status: No    Use of blood products discussed: No .     Postoperative Care    Pain management: IV analgesics, Oral pain medications.        Comments:                Brendon Rod MD, MD

## 2022-10-14 NOTE — ANESTHESIA POSTPROCEDURE EVALUATION
Patient: Ada Welch    Procedure: Procedure(s):  ESOPHAGOGASTRODUODENOSCOPY, WITH BIOPSY       Anesthesia Type:  MAC    Note:  Disposition: Outpatient   Postop Pain Control: Uneventful            Sign Out: Well controlled pain   PONV: No   Neuro/Psych: Uneventful            Sign Out: Acceptable/Baseline neuro status   Airway/Respiratory: Uneventful            Sign Out: Acceptable/Baseline resp. status   CV/Hemodynamics: Uneventful            Sign Out: Acceptable CV status; No obvious hypovolemia; No obvious fluid overload   Other NRE: NONE   DID A NON-ROUTINE EVENT OCCUR? No           Last vitals:  Vitals Value Taken Time   /63 10/14/22 1128   Temp 36.2  C (97.2  F) 10/14/22 1128   Pulse 60 10/14/22 1128   Resp 16 10/14/22 1128   SpO2 98 % 10/14/22 1128       Electronically Signed By: Brendon Rod MD, MD  October 14, 2022  3:51 PM

## 2022-10-14 NOTE — H&P
Ada CASTANON Rebekah  [id number]  female  51 year old      Reason for procedure/surgery: dysphagia, epigastric pain, sp fundoplication    Patient Active Problem List   Diagnosis     Chronic left shoulder pain     s/p TLIF L4-5,L5-S1 with solid arthrodesis     Tom-Danlos syndrome     Postlaminectomy syndrome, lumbar region     Headache     Cervicalgia     Urinary urgency     Nocturia     Acquired hypothyroidism     Allergic rhinitis     Anxiety     Anxiety disorder     Astigmatism     Chronic sinusitis with recurrent bronchitis     Arthritis of facet joints at multiple vertebral levels     Arthritis involving multiple sites     Opioid dependence (H)     Cyst of fallopian tube     DNS (deviated nasal septum)     Dysuria     Easy bruising     Encounter for genetic counseling and testing     Essential hypertension with goal blood pressure less than 140/90     Head and neck lymphadenopathy     Hepatomegaly     Keratoconjunctivitis sicca (H)     Low back pain radiating to both legs     Liver lesion     Low back pain     Low back pain of multiple sites of spine with sciatica     Lumbar degenerative disc disease     Lumbar radiculopathy     Lymphadenopathy of left cervical region     Intractable chronic migraine without aura and without status migrainosus     Nasal turbinate hypertrophy     Obese     Opioid type dependence, continuous (H)     Other acute postoperative pain     Pain disorder associated with psychological and physical factors     Presbyopia     Spondylolisthesis of lumbar region     Sinus tachycardia     Uterine endometriosis     Autonomic dysfunction     POTS (postural orthostatic tachycardia syndrome)     s/p hiatal hernia repair, nissen, PEG, 2/10     Hiatal hernia     Facet arthropathy, cervical     Cervical pain     Chronic pain of both shoulders     Cervical facet joint syndrome     ERRONEOUS ENCOUNTER--DISREGARD     Arthropathy of cervical facet joint     Chronic right shoulder pain     Chronic  sacroiliac joint pain       Past Surgical History:    Past Surgical History:   Procedure Laterality Date     AS REPAIR OF NASAL SEPTUM  2009    repair broke nose     BACK SURGERY  12/09/2013  10/01/2014    Spinal fusion L4-S1, L5 moving 5/8ths in. Remove screws/rods     BIOPSY  01/01/2008 & 3/2017    mole biopsy, lymph node biopsy     COLONOSCOPY  3/2017    Colonoscopy and GI     ESOPHAGEAL IMPEDENCE FUNCTION TEST WITH 24 HOUR PH GREATER THAN 1 HOUR N/A 9/17/2019    Procedure: IMPEDANCE PH STUDY, ESOPHAGUS, 24 HOUR;  Surgeon: Raghavendra Grande MD;  Location: [facility]     ESOPHAGOSCOPY, GASTROSCOPY, DUODENOSCOPY (EGD), COMBINED N/A 8/3/2018    Procedure: COMBINED ESOPHAGOSCOPY, GASTROSCOPY, DUODENOSCOPY (EGD), BIOPSY SINGLE OR MULTIPLE;;  Surgeon: Juan Woodson MD;  Location: [facility]     ESOPHAGOSCOPY, GASTROSCOPY, DUODENOSCOPY (EGD), COMBINED N/A 10/22/2018    Procedure: COMBINED ESOPHAGOSCOPY, GASTROSCOPY, DUODENOSCOPY (EGD), BIOPSY SINGLE OR MULTIPLE;  Surgeon: Sadia Carolina MD;  Location: [facility]     ESOPHAGOSCOPY, GASTROSCOPY, DUODENOSCOPY (EGD), COMBINED N/A 12/17/2018    Procedure: COMBINED ESOPHAGOSCOPY, GASTROSCOPY, DUODENOSCOPY (EGD);  Surgeon: Juan Woodson MD;  Location: [facility]     INJECT BLOCK MEDIAL BRANCH CERVICAL/THORACIC/LUMBAR Left 9/24/2020    Procedure: BLOCK, NERVE, FACET JOINT, MEDIAL BRANCH, DIAGNOSTIC;  Surgeon: Faiza Martinez MD;  Location: [facility]     INJECT BLOCK MEDIAL BRANCH CERVICAL/THORACIC/LUMBAR Bilateral 10/8/2020    Procedure: Bilateral cervical 3, cervical 4, and cervical 5 medial branch nerve block;  Surgeon: Faiza Martinez MD;  Location: [facility]     INJECT BLOCK MEDIAL BRANCH CERVICAL/THORACIC/LUMBAR Right 3/4/2021    Procedure: Cervical 3, cervical 4, cervical 5 medial branch nerve blocks to target C3-C4 and C4-C5 facet joint;  Surgeon: Faiza Martinez MD;  Location: [facility]     LAPAROSCOPIC HERNIORRHAPHY HIATAL N/A 2/10/2020    Procedure: LAPAROSCOPIC HIATAL HERNIA REPAIR,  NISSEN FUNDOPLICATION, ESOPHOGASTRODUODENOSCOPY, PEG TUBE PLACEMENT.;  Surgeon: Alana Mack MD;  Location: [facility]     NASAL/SINUS POLYPECTOMY      Polyps were benign     NOSE SURGERY      deviated septum     TONSILLECTOMY & ADENOIDECTOMY  1981     TUBAL LIGATION  2011       Past Medical History:   Past Medical History:   Diagnosis Date     Allergic rhinitis 2007     Anxiety      Arthritis 2013    Found during search for cause of back pain     Autoimmune disease (H) 2016    Immunosuppresive     Autonomic dysfunction      Bleeding disorder (H) 2016    Bruise easily     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     Cervicalgia      Chronic sinusitis     Diagnosed with chronic pansinusitis 2016     Coagulation disorder (H)     factor 8     CSF leak     Chiari malformation     Depression      Tom-Danlos disease      Eosinophilic esophagitis 2018     Gastroesophageal reflux disease 3/1/2017    I have large hiatal hernia     Hiatal hernia     Have adeline hiatel hernia     Hoarseness Unsure    It comes and goes     Hypertension      Hypothyroid      IBS (irritable bowel syndrome) with constipation     improved on Linzess     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     Irregular heart beat      Migraines 2007    Unsure of exact date     Nasal polyps     Were revoved 2017, benign     Orthostatic hypotension      Other nervous system complications 2014    Autonomic nervous system disorder, neuralgia, neuropathy     Swelling, mass, or lump in head and neck 2016    Lymph node has been growing for last year     Thyroid disease 2008     Urinary incontinence      Urinary urgency        Social History:   Social History     Tobacco Use     Smoking status: Former     Packs/day: 0.20     Years: 10.00     Pack years: 2.00     Types: Cigarettes     Start date: 1991     Quit date: 2013     Years since quittin.8      Smokeless tobacco: Never   Substance Use Topics     Alcohol use: Yes     Comment: once a month       Family History:   Family History   Problem Relation Age of Onset     Connective Tissue Disorder Mother         eds     Connective Tissue Disorder Sister         eds     Cancer Father         Spinal tumor grew into spinal cord, went in brain     Migraines Father      Diabetes Maternal Grandmother         Elderly diabetes     Heart Disease Maternal Grandmother      Hypertension Maternal Grandmother      Diabetes Paternal Grandmother         Elderly diabetes     Diabetes Paternal Grandfather         Elderly diabetes     Asthma Sister         Pediatric Asthma     Migraines Sister      Asthma Son         Pediatric Asthma     Thyroid Disease Sister         ,     Migraines Sister      Migraines Sister      Breast Cancer No family hx of        Allergies:   Allergies   Allergen Reactions     Baclofen Other (See Comments)     Loss of muscle tone. Muscle weakness.     Bee Venom Shortness Of Breath, Palpitations, Anaphylaxis and Difficulty breathing     Patient does carry Epi-Pen     Chicken-Derived Products (Egg) Nausea and Diarrhea     Compazine [Prochlorperazine]      Extreme jittery     Food      Milk     Gabapentin      Dizzy     Gluten Meal GI Disturbance     Wheat bran and wheat     Pregabalin      Seasonal Allergies      Dust, mold     Topiramate      Pt does not remember reaction       Active Medications:   Current Outpatient Medications   Medication Sig Dispense Refill     acetaminophen (TYLENOL) 500 MG tablet Take 1,000 mg by mouth every 6 hours as needed for mild pain       amitriptyline (ELAVIL) 25 MG tablet Take 1 tablet (25 mg) by mouth At Bedtime 30 tablet 0     buprenorphine (BUTRANS) 10 MCG/HR WK patch Place 1 patch onto the skin every 7 days 4 patch 1     cetirizine (ZYRTEC) 10 MG tablet Take 10 mg by mouth daily as needed        co-enzyme Q-10 100 MG CAPS capsule Take 100 mg by mouth daily        DULoxetine (CYMBALTA) 30 MG capsule Take 60mg in the morning and 30mg at bedtime 270 capsule 1     ferrous fumarate 65 mg, Council. FE,-Vitamin C 125 mg (VITRON-C)  MG TABS tablet Take 1 tablet by mouth every other day 60 tablet 4     fluticasone (FLONASE) 50 MCG/ACT nasal spray Spray 2 sprays into both nostrils daily Call clinic to schedule follow up appointment. 48 mL 11     HYDROmorphone (DILAUDID) 2 MG tablet Take 2 tablets (4 mg) by mouth every 6 hours as needed for pain 27 tablet 0     ivabradine (CORLANOR) 7.5 MG tablet Take 1 tablet (7.5 mg) by mouth 2 times daily 180 tablet 3     Lidocaine (LIDOCARE) 4 % Patch Place 1-3 patches onto the skin every 24 hours To prevent lidocaine toxicity, patient should be patch free for 12 hrs daily. 20 patch 0     medical cannabis (Patient's own supply.  Not a prescription) Take 1 Dose by mouth See Admin Instructions Capsule formulation - uses as needed       metoclopramide (REGLAN) 10 MG tablet Take 1 tablet (10 mg) by mouth 4 times daily as needed (headache) 40 tablet 3     Multiple Vitamins-Minerals (MULTIVITAMIN PO) Take 1 tablet by mouth daily        naproxen sodium (ANAPROX) 220 MG tablet Take 440-660 mg by mouth daily as needed for moderate pain       Probiotic Product (PROBIOTIC DAILY PO) Take 2 packets by mouth every morning        propranolol ER (INDERAL LA) 120 MG 24 hr capsule Take 1 capsule (120 mg) by mouth three times a week 39 capsule 3     Rhubarb (ESTROVEN COMPLETE) 4 MG TABS Take 1 tablet by mouth       rizatriptan (MAXALT-MLT) 5 MG ODT TAKE 1-2 TABLETS (5-10 MG) BY MOUTH AT ONSET OF HEADACHE FOR MIGRAINE (MAX 30 MG IN 24 HOURS) 18 tablet 11     SUMAtriptan (IMITREX STATDOSE) 6 MG/0.5ML pen injector kit Inject 0.5 ml (6 mg) subcutaneously at onset of migraine, May repeat in 1 hour , Max 12 mg/24 hrs 2 kit 11     tiZANidine (ZANAFLEX) 4 MG tablet Take 1 tablet (4 mg) by mouth 4 times daily as needed for muscle spasms 120 tablet 1     traMADol (ULTRAM)  "50 MG tablet Take 1 tablet (50 mg) by mouth every 6 hours as needed for severe pain 60 tablet 1     vitamin B complex with vitamin C (STRESS TAB) tablet Take 1 tablet by mouth daily       VITAMIN D, CHOLECALCIFEROL, PO Take 2,000 Units by mouth daily       doxepin (SILENOR) 3 MG tablet Take 1 tablet (3 mg) by mouth nightly as needed for sleep 30 tablet 1     EPINEPHrine (EPIPEN/ADRENACLICK/OR ANY BX GENERIC EQUIV) 0.3 MG/0.3ML injection 2-pack INJECT 0.3 MLS (0.3 MG) INTO THE MUSCLE AS NEEDED FOR ANAPHYLAXIS  1     linaclotide (LINZESS) 145 MCG capsule Take 1 capsule (145 mcg) by mouth every morning (before breakfast) 30 capsule 11     lubiprostone (AMITIZA) 8 MCG capsule Take 2 capsules (16 mcg) by mouth 2 times daily 120 capsule 1     naloxone (NARCAN) 4 MG/0.1ML nasal spray Spray 1 spray (4 mg) into one nostril alternating nostrils once as needed for opioid reversal every 2-3 minutes until assistance arrives 0.2 mL 0     Suvorexant (BELSOMRA) 5 MG tablet Take 1-2 tablets (5-10 mg) by mouth nightly as needed for sleep May cause sedation, use with caution with pain meds or sedatives. 30 tablet 1       Systemic Review:   CONSTITUTIONAL: NEGATIVE for fever, chills, change in weight  ENT/MOUTH: NEGATIVE for ear, mouth and throat problems  RESP: NEGATIVE for significant cough or SOB  CV: NEGATIVE for chest pain, palpitations or peripheral edema    Physical Examination:   Vital Signs: /82 (BP Location: Right arm)   Pulse 52   Temp 98.6  F (37  C) (Temporal)   Resp 18   Ht 1.702 m (5' 7\")   Wt 77.1 kg (170 lb)   LMP  (LMP Unknown)   SpO2 96%   BMI 26.63 kg/m    GENERAL: healthy, alert and no distress  NECK: no adenopathy, no asymmetry, masses, or scars  RESP: lungs clear to auscultation - no rales, rhonchi or wheezes  CV: regular rate and rhythm, normal S1 S2, no S3 or S4, no murmur, click or rub, no peripheral edema and peripheral pulses strong  ABDOMEN: soft, nontender, no hepatosplenomegaly, no masses " and bowel sounds normal  MS: no gross musculoskeletal defects noted, no edema    ASA: 2    Mallampati Score: 2    Plan: Appropriate to proceed as scheduled.      Jay Shaffer MD  10/14/2022    PCP:  Ellyn Rivera

## 2022-10-14 NOTE — INTERVAL H&P NOTE
"I have reviewed the surgical (or preoperative) H&P that is linked to this encounter, and examined the patient. There are no significant changes    Clinical Conditions Present on Arrival:  Clinically Significant Risk Factors Present on Admission                   # Overweight: Estimated body mass index is 26.63 kg/m  as calculated from the following:    Height as of this encounter: 1.702 m (5' 7\").    Weight as of this encounter: 77.1 kg (170 lb).       "

## 2022-10-17 ENCOUNTER — VIRTUAL VISIT (OUTPATIENT)
Dept: SLEEP MEDICINE | Facility: CLINIC | Age: 52
End: 2022-10-17
Attending: PSYCHIATRY & NEUROLOGY
Payer: COMMERCIAL

## 2022-10-17 VITALS
BODY MASS INDEX: 26.68 KG/M2 | HEIGHT: 67 IN | SYSTOLIC BLOOD PRESSURE: 102 MMHG | DIASTOLIC BLOOD PRESSURE: 63 MMHG | WEIGHT: 170 LBS

## 2022-10-17 DIAGNOSIS — R06.83 SNORING: Primary | ICD-10-CM

## 2022-10-17 DIAGNOSIS — F51.04 CHRONIC INSOMNIA: ICD-10-CM

## 2022-10-17 DIAGNOSIS — G25.71 AKATHISIA: ICD-10-CM

## 2022-10-17 DIAGNOSIS — G47.19 EXCESSIVE DAYTIME SLEEPINESS: ICD-10-CM

## 2022-10-17 DIAGNOSIS — F41.9 ANXIETY: ICD-10-CM

## 2022-10-17 DIAGNOSIS — Q07.00 ARNOLD-CHIARI MALFORMATION (H): ICD-10-CM

## 2022-10-17 DIAGNOSIS — F11.20 OPIOID TYPE DEPENDENCE, CONTINUOUS (H): ICD-10-CM

## 2022-10-17 LAB
PATH REPORT.COMMENTS IMP SPEC: NORMAL
PATH REPORT.COMMENTS IMP SPEC: NORMAL
PATH REPORT.FINAL DX SPEC: NORMAL
PATH REPORT.GROSS SPEC: NORMAL
PATH REPORT.MICROSCOPIC SPEC OTHER STN: NORMAL
PATH REPORT.RELEVANT HX SPEC: NORMAL
PHOTO IMAGE: NORMAL

## 2022-10-17 PROCEDURE — 88305 TISSUE EXAM BY PATHOLOGIST: CPT | Mod: 26 | Performed by: PATHOLOGY

## 2022-10-17 PROCEDURE — 99205 OFFICE O/P NEW HI 60 MIN: CPT | Mod: 95 | Performed by: PHYSICIAN ASSISTANT

## 2022-10-17 ASSESSMENT — SLEEP AND FATIGUE QUESTIONNAIRES
HOW LIKELY ARE YOU TO NOD OFF OR FALL ASLEEP WHILE SITTING INACTIVE IN A PUBLIC PLACE: WOULD NEVER DOZE
HOW LIKELY ARE YOU TO NOD OFF OR FALL ASLEEP IN A CAR, WHILE STOPPED FOR A FEW MINUTES IN TRAFFIC: WOULD NEVER DOZE
HOW LIKELY ARE YOU TO NOD OFF OR FALL ASLEEP WHILE SITTING QUIETLY AFTER LUNCH WITHOUT ALCOHOL: HIGH CHANCE OF DOZING
HOW LIKELY ARE YOU TO NOD OFF OR FALL ASLEEP WHILE LYING DOWN TO REST IN THE AFTERNOON WHEN CIRCUMSTANCES PERMIT: HIGH CHANCE OF DOZING
HOW LIKELY ARE YOU TO NOD OFF OR FALL ASLEEP WHILE SITTING AND TALKING TO SOMEONE: WOULD NEVER DOZE
HOW LIKELY ARE YOU TO NOD OFF OR FALL ASLEEP WHEN YOU ARE A PASSENGER IN A CAR FOR AN HOUR WITHOUT A BREAK: HIGH CHANCE OF DOZING
HOW LIKELY ARE YOU TO NOD OFF OR FALL ASLEEP WHILE SITTING AND READING: HIGH CHANCE OF DOZING
HOW LIKELY ARE YOU TO NOD OFF OR FALL ASLEEP WHILE WATCHING TV: HIGH CHANCE OF DOZING

## 2022-10-17 NOTE — NURSING NOTE
Flu vaccine 11/29/21.  Patient declined individual medication review by support staff because - pt stated data was up to date due to being reviewed for visit on 10/14/22.

## 2022-10-17 NOTE — PATIENT INSTRUCTIONS
Insomnia and Behavioral Sleep Medicine Program    The [facility] Insomnia and Behavioral Sleep Medicine Program provides non-drug treatment for sleep problems including:    Cognitive-behavioral Therapies for Insomnia (CBT-I)  Management of Shift-work and Jet Lag  Management of Delayed, Advanced and Irregular Circadian Rhythm Sleep Disorders  Imagery Rehearsal Therapy (IRT) for Nightmare Disorder  PAP Therapy Desensitization    You have been referred for consultation with a sleep psychologist who specializes in behavioral sleep medicine and treatment of insomnia.  The [facility] Insomnia and Behavioral Sleep Medicine Program offers individualized telehealth services through our [facility] and online CBT-I.    Preparing for your Consultation    You will need to keep a Sleep Diary for at least a week prior to your visit. Complete the sleep diary each day first thing after you get up by answering a few key questions about your sleep using our convenient mobile kristal or paper sleep diary.  Your answers should be based on your recall of the past 24 hours.  Avoid watching the clock or recording data during the night.     Insomnia  Kristal    The Insomnia  mobile kristal  is a convenient way to keep track of your sleep prior to your sleep consultation.  Simply download the free kristal on your Apple or Android phone and record your information each morning.  The kristal includes training, self-assessment, and sleep schedule recommendations.  Prior to your consultation we recommend you use only the sleep diary function. You can e-mail yourself a copy of your sleep diary data by going to the Settings section and using the [place] User Data function.  During your consultation your provider will review the data with you.          [facility] Sleep Diary    You can also track your sleep using the [facility] paper sleep diary.  You can upload your sleep diary and send it via a [employer]  message, fax it to [phone], or have it with you at the time of your consultation.            CBT-I:  Frequently Asked Questions    What is CBT-I?    Cognitive Behavioral Therapy for Insomnia, also known as CBT-I, is a highly effective non-drug treatment for insomnia. The American College of Physicians recommends CBT-I as the first treatment for chronic insomnia.  Research has shown CBT-I to be safer and more effective long term than sleeping pills.    What does CBT-I involve?     CBT-I targets behaviors that lead to chronic insomnia:  Habits that weaken the bed as a cue for sleep  Habits that weaken your body's sleep drive and sleep/wake clock   Unhelpful sleep thoughts that increase sleep-related worry and arousal.    The process involves 3-6 telehealth visits that guide you to implement proven strategies to get a better night's sleep.    People often see improvement in their sleep within a few weeks. Research shows if you keep practicing the skills you learn your sleep is likely to continue to improve 6-12 months after treatment.    Does this program prescribe or manage sleep medication?    No.  Your prescribing provider is responsible to assist you in managing your sleep medications.  Some people choose to stop using sleep medication prior to or during CBT-I.  Our program can work with your prescribing provider to help reduce or eliminate use of sleep medications.     Getting Started Today!    If you haven't already done so, we recommend you consider making the following changes to your sleep habits prior to your sleep consultation:     Reduce your consumption of caffeine and alcohol.  Both can disrupt sleep and make strengthening your sleep more difficult.  Specifically:    - Avoid caffeine within 6 hours of bedtime   - No more than 3 caffeinated beverages per day (e.g. 8 oz. cup coffee or 12 oz. cup soda)            - No alcohol within 3 hours of bedtime    Make sure your bedroom is quiet, comfortable and  dark.  Noise, light and an uncomfortable sleep space can harm your sleep.      Keep the same sleep schedule 7 days a week.unless you do shift work.      Online CBT-I     If you want to get started today, research indicates that online CBT-I can be effective for some individuals. These programs requires comfort with crow-based or online learning.  However, digital CBT-I programs are not for everyone.  Contraindications include:    Seizure disorders,   Bipolar disorder,   Unstable medical or mental health conditions,   Frailty or risk of falling  Pregnancy    You should consult a sleep specialist before using these resources if you have:    Sleep Apnea  Restless Leg Syndrome  Sleep Walking  REM behavior disorder  Night Terrors  Excessive Daytime Sleepiness  Are engaged in shift work  Use prescription sleep medication    Our Online CBT-I program    If your sleep provider recommends online CBT-I for you , the cost for an entire 6-week program is $40.    To get started, copy and paste the link below which will take you to the landing page to register:                           [email or web address]               If you wish to complete the online CBT-I program but do not plan to follow-up with a sleep provider, you are set to begin the program.    If you are planning to work with an [facility] sleep provider, there are a couple of extra steps you can take to share your sleep data with your sleep provider.  To share sleep log data, go to the left side navigation and click on the  share sleep log  button:         You will be taken to the page below where you will enter  the provider code [facility] into the box.          Once you press the locate button, the information for [facility] will pop up as below.  By pressing the Submit button your data will be sent to our  secure [facility] [employer] sleep program portal for review by your sleep provider. You will only need to do this step once.                            "       Self-help Workbooks for Insomnia    If you have found self-help books useful in the past, you may want to consider reading one of the following books prior to your consultation:    Say [place] to Insomnia: The Six-Week, Drug-Free Program Developed at [employer].  Corey Abernathy MD. Available in paperback, Jimena, and audiobook.    Overcoming Insomnia: A Cognitive-Behavioral Therapy Approach, Workbook.  Clinton Wilson, PhD  and Seema Kiser, PhD.  Available in paperback and Jimena.    Quiet Your Mind and Get to Sleep: Solutions to Insomnia for Those with Depression, Anxiety, or Chronic Pain.  Yanely Yang, PhD and Seema Kiser, PhD.  Available in paperback and Jimena              MY TREATMENT INFORMATION FOR SLEEP APNEA-  Ada Welch    Am I having a sleep study at a sleep center?  --->Due to normal delays, you will be contacted within 2-4 weeks to schedule    Frequently asked questions:  1. What is Obstructive Sleep Apnea (ABHINAV)? ABHINAV is the most common type of sleep apnea. Apnea means, \"without breath.\"  Apnea is most often caused by narrowing or collapse of the upper airway as muscles relax during sleep.   Almost everyone has occasional apneas. Most people with sleep apnea have had brief interruptions at night frequently for many years.  The severity of sleep apnea is related to how frequent and severe the events are.   2. What are the consequences of ABHINAV? Symptoms include: feeling sleepy during the day, snoring loudly, gasping or stopping of breathing, trouble sleeping, and occasionally morning headaches or heartburn at night.  Sleepiness can be serious and even increase the risk of falling asleep while driving. Other health consequences may include development of high blood pressure and other cardiovascular disease in persons who are susceptible. Untreated ABHINAV  can contribute to heart disease, stroke and diabetes.   3. What are the treatment options? In most situations, sleep apnea is a " lifelong disease that must be managed with daily therapy. Medications are not effective for sleep apnea and surgery is generally not considered until other therapies have been tried. Your treatment is your choice . Continuous Positive Airway (CPAP) works right away and is the therapy that is effective in nearly everyone. An oral device to hold your jaw forward is usually the next most reliable option. Other options include postioning devices (to keep you off your back), weight loss, and surgery including a tongue pacing device. There is more detail about some of these options below.  4. Are my sleep studies covered by insurance? Although we will request verification of coverage, we advise you also check in advance of the study to ensure there is coverage.    Important tips for those choosing CPAP and similar devices   Know your equipment:  CPAP is continuous positive airway pressure that prevents obstructive sleep apnea by keeping the throat from collapsing while you are sleeping. In most cases, the device is  smart  and can slowly self-adjusts if your throat collapses and keeps a record every day of how well you are treated-this information is available to you and your care team.  BPAP is bilevel positive airway pressure that keeps your throat open and also assists each breath with a pressure boost to maintain adequate breathing.  Special kinds of BPAP are used in patients who have inadequate breathing from lung or heart disease. In most cases, the device is  smart  and can slowly self-adjusts to assist breathing. Like CPAP, the device keeps a record of how well you are treated.  Your mask is your connection to the device. You get to choose what feels most comfortable and the staff will help to make sure if fits. Here: are some examples of the different masks that are available:       Key points to remember on your journey with sleep apnea:  Sleep study.  PAP devices often need to be adjusted during a sleep study  to show that they are effective and adjusted right.  Good tips to remember: Try wearing just the mask during a quiet time during the day so your body adapts to wearing it. A humidifier is recommended for comfort in most cases to prevent drying of your nose and throat. Allergy medication from your provider may help you if you are having nasal congestion.  Getting settled-in. It takes more than one night for most of us to get used to wearing a mask. Try wearing just the mask during a quiet time during the day so your body adapts to wearing it. A humidifier is recommended for comfort in most cases. Our team will work with you carefully on the first day and will be in contact within 4 days and again at 2 and 4 weeks for advice and remote device adjustments. Your therapy is evaluated by the device each day.   Use it every night. The more you are able to sleep naturally for 7-8 hours, the more likely you will have good sleep and to prevent health risks or symptoms from sleep apnea. Even if you use it 4 hours it helps. Occasionally all of us are unable to use a medical therapy, in sleep apnea, it is not dangerous to miss one night.   Communicate. Call our skilled team on the number provided on the first day if your visit for problems that make it difficult to wear the device. Over 2 out of 3 patients can learn to wear the device long-term with help from our team. Remember to call our team or your sleep providers if you are unable to wear the device as we may have other solutions for those who cannot adapt to mask CPAP therapy. It is recommended that you sleep your sleep provider within the first 3 months and yearly after that if you are not having problems.   Use it for your health. We encourage use of CPAP masks during daytime quiet periods to allow your face and brain to adapt to the sensation of CPAP so that it will be a more natural sensation to awaken to at night or during naps. This can be very useful during the  first few weeks or months of adapting to CPAP though it does not help medically to wear CPAP during wakefulness and  should not be used as a strategy just to meet guidelines.  Take care of your equipment. Make sure you clean your mask and tubing using directions every day and that your filter and mask are replaced as recommended or if they are not working.     BESIDES CPAP, WHAT OTHER THERAPIES ARE THERE?    Positioning Device  Positioning devices are generally used when sleep apnea is mild and only occurs on your back.This example shows a pillow that straps around the waist. It may be appropriate for those whose sleep study shows milder sleep apnea that occurs primarily when lying flat on one's back. Preliminary studies have shown benefit but effectiveness at home may need to be verified by a home sleep test. These devices are generally not covered by medical insurance.  Examples of devices that maintain sleeping on the back to prevent snoring and mild sleep apnea.    Belt type body positioner  http://[employer]/    Electronic reminder  [email or web address].[employer]/            Oral Appliance  What is oral appliance therapy?  An oral appliance device fits on your teeth at night like a retainer used after having braces. The device is made by a specialized dentist and requires several visits over 1-2 months before a manufactured device is made to fit your teeth and is adjusted to prevent your sleep apnea. Once an oral device is working properly, snoring should be improved. A home sleep test may be recommended at that time if to determine whether the sleep apnea is adequately treated.       Some things to remember:  -Oral devices are often, but not always, covered by your medical insurance. Be sure to check with your insurance provider.   -If you are referred for oral therapy, you will be given a list of specialized dentists to consider or you may choose to visit the Web site of the American Academy of Dental Sleep  Medicine  -Oral devices are less likely to work if you have severe sleep apnea or are extremely overweight.     More detailed information  An oral appliance is a small acrylic device that fits over the upper and lower teeth  (similar to a retainer or a mouth guard). This device slightly moves jaw forward, which moves the base of the tongue forward, opens the airway, improves breathing for effective treat snoring and obstructive sleep apnea in perhaps 7 out of 10 people .  The best working devices are custom-made by a dental device  after a mold is made of the teeth 1, 2, 3.  When is an oral appliance indicated?  Oral appliance therapy is recommended as a first-line treatment for patients with primary snoring, mild sleep apnea, and for patients with moderate sleep apnea who prefer appliance therapy to use of CPAP4, 5. Severity of sleep apnea is determined by sleep testing and is based on the number of respiratory events per hour of sleep.   How successful is oral appliance therapy?  The success rate of oral appliance therapy in patients with mild sleep apnea is 75-80% while in patients with moderate sleep apnea it is 50-70%. The chance of success in patients with severe sleep apnea is 40-50%. The research also shows that oral appliances have a beneficial effect on the cardiovascular health of ABHINAV patients at the same magnitude as CPAP therapy7.  Oral appliances should be a second-line treatment in cases of severe sleep apnea, but if not completely successful then a combination therapy utilizing CPAP plus oral appliance therapy may be effective. Oral appliances tend to be effective in a broad range of patients although studies show that the patients who have the highest success are females, younger patients, those with milder disease, and less severe obesity. 3, 6.   Finding a dentist that practices dental sleep medicine  Specific training is available through the American Academy of Dental Sleep Medicine  for dentists interested in working in the field of sleep. To find a dentist who is educated in the field of sleep and the use of oral appliances, near you, visit the Web site of the American Academy of Dental Sleep Medicine.    References  1. Flaco et al. Objectively measured vs self-reported compliance during oral appliance therapy for sleep-disordered breathing. Chest 2013; 144(5): 5717-8734.  2. Abdi et al. Objective measurement of compliance during oral appliance therapy for sleep-disordered breathing. Thorax 2013; 68(1): 91-96.  3. Jesse et al. Mandibular advancement devices in 620 men and women with ABHINAV and snoring: tolerability and predictors of treatment success. Chest 2004; 125: 1784-3159.  4. Toney et al. Oral appliances for snoring and ABHINAV: a review. Sleep 2006; 29: 244-262.  5. Remi et al. Oral appliance treatment for ABHINAV: an update. J Clin Sleep Med 2014; 10(2): 215-227.  6. Kayce et al. Predictors of OSAH treatment outcome. J Dent Res 2007; 86: 4848-1062.      Weight Loss:    Weight loss is a long-term strategy that may improve sleep apnea in some patients.    Weight management is a personal decision and the decision should be based on your interest and the potential benefits.  If you are interested in exploring weight loss strategies, the following discussion covers the impact on weight loss on sleep apnea and the approaches that may be successful.    Being overweight does not necessarily mean you will have health consequences.  Those who have BMI over 35 or over 27 with existing medical conditions carries greater risk.   Weight loss decreases severity of sleep apnea in most people with obesity. For those with mild obesity who have developed snoring with weight gain, even 15-30 pound weight loss can improve and occasionally eliminate sleep apnea.  Structured and life-long dietary and health habits are necessary to lose weight and keep healthier weight levels.     Though  there may be significant health benefits from weight loss, long-term weight loss is very difficult to achieve- studies show success with dietary management in less than 10% of people. In addition, substantial weight loss may require years of dietary control and may be difficult if patients have severe obesity. In these cases, surgical management may be considered.  Finally, older individuals who have tolerated obesity without health complications may be less likely to benefit from weight loss strategies.      [unfilled]    Surgery:    Surgery for obstructive sleep apnea is considered generally only when other therapies fail to work. Surgery may be discussed with you if you are having a difficult time tolerating CPAP and or when there is an abnormal structure that requires surgical correction.  Nose and throat surgeries often enlarge the airway to prevent collapse.  Most of these surgeries create pain for 1-2 weeks and up to half of the most common surgeries are not effective throughout life.  You should carefully discuss the benefits and drawbacks to surgery with your sleep provider and surgeon to determine if it is the best solution for you.   More information  Surgery for ABHINAV is directed at areas that are responsible for narrowing or complete obstruction of the airway during sleep.  There are a wide range of procedures available to enlarge and/or stabilize the airway to prevent blockage of breathing in the three major areas where it can occur: the palate, tongue, and nasal regions.  Successful surgical treatment depends on the accurate identification of the factors responsible for obstructive sleep apnea in each person.  A personalized approach is required because there is no single treatment that works well for everyone.  Because of anatomic variation, consultation with an examination by a sleep surgeon is a critical first step in determining what surgical options are best for each patient.  In some cases,  examination during sedation may be recommended in order to guide the selection of procedures.  Patients will be counseled about risks and benefits as well as the typical recovery course after surgery. Surgery is typically not a cure for a person s ABHINAV.  However, surgery will often significantly improve one s ABHINAV severity (termed  success rate ).  Even in the absence of a cure, surgery will decrease the cardiovascular risk associated with OSA7; improve overall quality of life8 (sleepiness, functionality, sleep quality, etc).      Palate Procedures:  Patients with ABHINAV often have narrowing of their airway in the region of their tonsils and uvula.  The goals of palate procedures are to widen the airway in this region as well as to help the tissues resist collapse.  Modern palate procedure techniques focus on tissue conservation and soft tissue rearrangement, rather than tissue removal.  Often the uvula is preserved in this procedure. Residual sleep apnea is common in patient after pharyngoplasty with an average reduction in sleep apnea events of 33%2.      Tongue Procedures:  ExamWhile patients are awake, the muscles that surround the throat are active and keep this region open for breathing. These muscles relax during sleep, allowing the tongue and other structures to collapse and block breathing.  There are several different tongue procedures available.  Selection of a tongue base procedure depends on characteristics seen on physical exam.  Generally, procedures are aimed at removing bulky tissues in this area or preventing the back of the tongue from falling back during sleep.  Success rates for tongue surgery range from 50-62%3.    Hypoglossal Nerve Stimulation:  Hypoglossal nerve stimulation has recently received approval from the [place] Food and Drug Administration for the treatment of obstructive sleep apnea.  This is based on research showing that the system was safe and effective in treating sleep  apnea6.  Results showed that the median AHI score decreased 68%, from 29.3 to 9.0. This therapy uses an implant system that senses breathing patterns and delivers mild stimulation to airway muscles, which keeps the airway open during sleep.  The system consists of three fully implanted components: a small generator (similar in size to a pacemaker), a breathing sensor, and a stimulation lead.  Using a small handheld remote, a patient turns the therapy on before bed and off upon awakening.    Candidates for this device must be greater than 22 years of age, have moderate to severe ABHINAV (AHI between 20-65), BMI less than 32, have tried CPAP/oral appliance without success, and have appropriate upper airway anatomy (determined by a sleep endoscopy performed by Dr. Amato).    Hypoglossal Nerve Stimulation Pathway:    The sleep surgeon s office will work with the patient through the insurance prior-authorization process (including communications and appeals).    Nasal Procedures:  Nasal obstruction can interfere with nasal breathing during the day and night.  Studies have shown that relief of nasal obstruction can improve the ability of some patients to tolerate positive airway pressure therapy for obstructive sleep apnea1.  Treatment options include medications such as nasal saline, topical corticosteroid and antihistamine sprays, and oral medications such as antihistamines or decongestants. Non-surgical treatments can include external nasal dilators for selected patients. If these are not successful by themselves, surgery can improve the nasal airway either alone or in combination with these other options.      Combination Procedures:  Combination of surgical procedures and other treatments may be recommended, particularly if patients have more than one area of narrowing or persistent positional disease.  The success rate of combination surgery ranges from 66-80%2,3.    References  Flakito HERRMANN. The Role of the Nose in  Snoring and Obstructive Sleep Apnoea: An Update.  Eur Arch Otorhinolaryngol. 2011; 268: 1365-73.   Kenyatta SM; Jenn JA; Carrillo JR; Pallanch JF; Asya MB; Gavin SG; eGne RUBIN. Surgical modifications of the upper airway for obstructive sleep apnea in adults: a systematic review and meta-analysis. SLEEP 2010;33(10):9401-1126. Yvon SUTTON. Hypopharyngeal surgery in obstructive sleep apnea: an evidence-based medicine review.  Arch Otolaryngol Head Neck Surg. 2006 Feb;132(2):206-13.  Kin YH1, Nitin Y, German JOCELINE. The efficacy of anatomically based multilevel surgery for obstructive sleep apnea. Otolaryngol Head Neck Surg. 2003 Oct;129(4):327-35.  Kezirian E, Goldberg A. Hypopharyngeal Surgery in Obstructive Sleep Apnea: An Evidence-Based Medicine Review. Arch Otolaryngol Head Neck Surg. 2006 Feb;132(2):206-13.  Calos PALAFOX et al. Upper-Airway Stimulation for Obstructive Sleep Apnea.  N Engl J Med. 2014 Jan 9;370(2):139-49.  Peker Y et al. Increased Incidence of Cardiovascular Disease in Middle-aged Men with Obstructive Sleep Apnea. Am J Respir Crit Care Med; 2002 166: 159-165  Oconnell EM et al. Studying Life Effects and Effectiveness of Palatopharyngoplasty (SLEEP) study: Subjective Outcomes of Isolated Uvulopalatopharyngoplasty. Otolaryngol Head Neck Surg. 2011; 144: 623-631.        WHAT IF I ONLY HAVE SNORING?    Mandibular advancement devices, lateral sleep positioning, long-term weight loss and treatment of nasal allergies have been shown to improve snoring.  Exercising tongue muscles with a game ([place] [employer]://apps.[employer]/us/crow/soundly-reduce-snoring/pj2527653533) or stimulating the tongue during the day with a device (https://doi.org/10.3390/nef13345089) have improved snoring in some individuals.    Remember to Drive Safe... Drive Alive     Sleep health profoundly affects your health, mood, and your safety.  Thirty three percent of the population (one in three of us) is not getting enough sleep and many have a sleep  disorder. Not getting enough sleep or having an untreated / undertreated sleep condition may make us sleepy without even knowing it. In fact, our driving could be dramatically impaired due to our sleep health. As your provider, here are some things I would like you to know about driving:     Here are some warning signs for impairment and dangerous drowsy driving:              -Having been awake more than 16 hours               -Looking tired               -Eyelid drooping              -Head nodding (it could be too late at this point)              -Driving for more than 30 minutes     Some things you could do to make the driving safer if you are experiencing some drowsiness:              -Stop driving and rest              -Call for transportation              -Make sure your sleep disorder is adequately treated     Some things that have been shown NOT to work when experiencing drowsiness while driving:              -Turning on the radio              -Opening windows              -Eating any  distracting  /  entertaining  foods (e.g., sunflower seeds, candy, or any other)              -Talking on the phone      Your decision may not only impact your life, but also the life of others. Please, remember to drive safe for yourself and all of us.

## 2022-10-17 NOTE — PROGRESS NOTES
Ada is a 51 year old who is being evaluated via a billable video visit.      How would you like to obtain your AVS? MyChart  If the video visit is dropped, the invitation should be resent by: Text to cell phone: [phone]  Will anyone else be joining your video visit? Aicha      Milena Strauss    Video-Visit Details    Video Start Time: 9:35AM    Type of service:  Video Visit    Video End Time:10:17AM    Originating Location (pt. Location): Home        Distant Location (provider location):  Off-site    Platform used for Video Visit: [facility]        Outpatient Sleep Medicine Consultation:      Name: Ada Welch MRN# [id number]   Age: 51 year old YOB: 1970     Date of Consultation: October 17, 2022  Consultation is requested by: Frank Domingo DO  [address] [place] [id number] Frank Smith*  Primary care provider: Ellyn Rivera       Reason for Sleep Consult:     Ada Welch is sent by Frank Smith* for a sleep consultation regarding akathisia and insomnia.    Patient s Reason for visit  Ada Welch main reason for visit:  Sleep apnea   Ada Welch's goals for this visit:  Sleep study          Assessment and Plan:     Summary Sleep Diagnoses:  1. Snoring  2. Excessive daytime sleepiness  3. Chronic insomnia  Comorbid Diagnoses:  4. Akathisia  5. Arnold-Chiari malformation (H)  6. Opioid type dependence, continuous (H)  7. Anxiety    Patient presents to clinic today primarily requesting sleep study to screen for sleep apnea.  This was ordered in past by Bennett Goltz, PA-C in 2017 but sleep study ultimately not completed due to her schedule.  She presents with similar symptoms and concerns today than she did to him 5 years ago.  Risk factors for sleep apnea include loud snoring, excessive daytime sleepiness (ESS 15), age 51.  Daytime sleepiness level may also be attributed to her multiple sedating medications, her insomnia also  playing a role.  Order was placed today for in lab PSG as HST is inappropriate for patient given daily narcotic.  She does have Chiari malformation as well.  Order was also placed today for sleep psychology referral to complete CBT-I given chronic insomnia. Discussed the importance of keeping a consistent sleep schedule all days of the week as a first place to start and will continue behavioral modifications with Dr. Buenrostro.  She also reports an occasional full body akathisia sensation.  Given that it does not necessarily improve with movement I am unsure if this is true restless leg, also at his full body. Would think her daily opioid would also help manage if was restless legs. She does have a history of low iron but not taking supplementation regularly and I encouraged her to restart daily Vitron-C and will monitor for improvement.  We will plan to see patient back 1-2 weeks following her sleep study for discussion of results and next steps.  We will follow-up with Dr. Buenrostro for CBT-I consult as schedule allows. Educational materials provided in instructions.  - Comprehensive Sleep Study; Future  - Sleep Psychology  Referral; Future       History of Present Illness:     Ada Welch is a 51 year old female with chronic pain, opioid dependence, migraine, allergic rhinitis, Tom-Danlos syndrome, hypothyroidism, POTS, anxiety, Chiari malformation who presents to clinic today for evaluation of insomnia and possible sleep apnea. Referred by neurology with note that she may benefit from sleep psychology. Describes a full body restlessness feeling that may or may not improve with movement.     Patient was previously evaluated in our clinic by Bennett Goltz, PA-C 9/28/2017.  At that time she presented with nocturia, chronic fatigue and migraines for 8+ years.  Ultimately, a split-night PSG was ordered to screen for sleep apnea given history of witnessed apneas by , snoring, nocturia, Chiari  "malformation.  Also discussed regular sleep-wake cycle.  Ultimately, sleep study never completed.      Presents today interested in pursuing sleep study. Patient states \"at the time it was impossible with my schedule to do a study but now I think it's more realistic and I do believe I have sleep apnea at least to some degree.\"    SLEEP-WAKE SCHEDULE:     Work/School Days:   Usually gets into bed at 8:30-9:00PM and will watch TV or read   Takes patient about 60 minutes to fall asleep (but watching TV/reading during this time) but can be hours/\"never\" - \"it just depends because I am exhausted but my brain just won't shut off\"  Wakes up in the middle of the night 1-5+ times.  Wakes up due to uncertain, nocturia 1-5x, pain   She has trouble falling back asleep ? times a week.   Takes minutes to hours to fall back asleep, unable to elaborate further  Patient is usually up at 6-6:30AM    Weekends/Non-work Days/All Other Days:  Usually gets into bed at 11-12:00AM  Patient is usually up at 8:30AM    Sleep Need  Patient estimates she gets 5-7 hours of sleep on average   Patient thinks she needs about 8-9 hours of sleep    Ada Welch prefers to sleep in this position(s): Side (R)    Naps  Patient takes a purposeful nap 7 times a week and naps are usually 1-2 hours in duration, typically around 8:30AM because AM meds make her drowsy   She feels better after a nap: Yes  She dozes off unintentionally \"depending how my body feels\"  Patient has had a driving accident or near-miss due to sleepiness/drowsiness: No    SLEEP DISRUPTIONS:    Breathing/Snoring  Patient snores: Yes \"fairly regular\"  Other people complain about her snoring: No but bed partner snores loudly as well  \"Once in a while\" will wake feeling short of breath   Patient has been told she stops breathing in her sleep: No  No nocturnal GERD  Daily morning headaches     Movement:  Restless legs syndrome: \"There are times but it's my whole body not just my legs I " "feel like I drank 5 5-hour energy drinks where I am exhausted but I can't lay still and nothing really helps\". Occurs in cycles per patient but unable to estimate how frequent. If sx are present will take hot shower/bath, drink hot tea or eat bowl of oatmeal, stretch but \"sometimes they work sometimes they don't \"   Patient has been told she kicks her legs at night:  No  History of low iron with most recent ferritin level 21 on 6/24/2022 -Vitron-C on medication list but admits she does not take regularly, last took couple weeks ago.     Behaviors in Sleep:  Ada Welch has experienced the following behaviors while sleeping: Sleep talking, bruxism and wears guard   No dream enactment, no sleepwalking     CAFFEINE AND OTHER SUBSTANCES:    Patient consumes caffeinated beverages per day: Coke zero 1-4 bottles/day  Last caffeine use is usually: 6:00PM  Patient drinks alcohol to help them sleep: No  Patient drinks alcohol near bedtime: No    Family History:  Patient has a family member been diagnosed with a sleep disorder: Mom snores but no study    SCALES:    EPWORTH SLEEPINESS SCALE      Worton Sleepiness Scale ( CABRERA Gama  [id number]<br>ESS - [place]/English - Final version - 21 Nov 07 - [facility] [place].) 10/17/2022   Sitting and reading High chance of dozing   Watching TV High chance of dozing   Sitting, inactive in a public place (e.g. a theatre or a meeting) Would never doze   As a passenger in a car for an hour without a break High chance of dozing   Lying down to rest in the afternoon when circumstances permit High chance of dozing   Sitting and talking to someone Would never doze   Sitting quietly after a lunch without alcohol High chance of dozing   In a car, while stopped for a few minutes in traffic Would never doze   Worton Score (MC) 15   Worton Score (Sleep) 15         INSOMNIA SEVERITY INDEX (NOEMI)      Insomnia Severity Index (NOEMI) 10/17/2022   Difficulty falling asleep 3   Difficulty staying " asleep 3   Problems waking up too early 3   How SATISFIED/DISSATISFIED are you with your CURRENT sleep pattern? 4   How NOTICEABLE to others do you think your sleep problem is in terms of impairing the quality of your life? 4   How WORRIED/DISTRESSED are you about your current sleep problem? 3   To what extent do you consider your sleep problem to INTERFERE with your daily functioning (e.g. daytime fatigue, mood, ability to function at work/daily chores, concentration, memory, mood, etc.) CURRENTLY? 4   NOEMI Total Score 24       Guidelines for Scoring/Interpretation:  Total score categories:  0-7 = No clinically significant insomnia   8-14 = Subthreshold insomnia   15-21 = Clinical insomnia (moderate severity)  22-28 = Clinical insomnia (severe)  Used via courtesy of www.[employer].[place].gov with permission from Maverick Whitten PhD., [facility]        Allergies:    Allergies   Allergen Reactions     Baclofen Other (See Comments)     Loss of muscle tone. Muscle weakness.     Bee Venom Shortness Of Breath, Palpitations, Anaphylaxis and Difficulty breathing     Patient does carry Epi-Pen     Chicken-Derived Products (Egg) Nausea and Diarrhea     Compazine [Prochlorperazine]      Extreme jittery     Food      Milk     Gabapentin      Dizzy     Gluten Meal GI Disturbance     Wheat bran and wheat     Pregabalin      Seasonal Allergies      Dust, mold     Topiramate      Pt does not remember reaction       Medications:    Current Outpatient Medications   Medication Sig Dispense Refill     acetaminophen (TYLENOL) 500 MG tablet Take 1,000 mg by mouth every 6 hours as needed for mild pain       amitriptyline (ELAVIL) 25 MG tablet Take 1 tablet (25 mg) by mouth At Bedtime 30 tablet 0     buprenorphine (BUTRANS) 10 MCG/HR WK patch Place 1 patch onto the skin every 7 days 4 patch 1     cetirizine (ZYRTEC) 10 MG tablet Take 10 mg by mouth daily as needed        co-enzyme Q-10 100 MG CAPS capsule Take 100 mg by mouth daily        DULoxetine (CYMBALTA) 30 MG capsule Take 60mg in the morning and 30mg at bedtime 270 capsule 1     EPINEPHrine (EPIPEN/ADRENACLICK/OR ANY BX GENERIC EQUIV) 0.3 MG/0.3ML injection 2-pack INJECT 0.3 MLS (0.3 MG) INTO THE MUSCLE AS NEEDED FOR ANAPHYLAXIS  1     ferrous fumarate 65 mg, [place]. FE,-Vitamin C 125 mg (VITRON-C)  MG TABS tablet Take 1 tablet by mouth every other day 60 tablet 4     fluticasone (FLONASE) 50 MCG/ACT nasal spray Spray 2 sprays into both nostrils daily Call clinic to schedule follow up appointment. 48 mL 11     HYDROmorphone (DILAUDID) 2 MG tablet Take 2 tablets (4 mg) by mouth every 6 hours as needed for pain 27 tablet 0     ivabradine (CORLANOR) 7.5 MG tablet Take 1 tablet (7.5 mg) by mouth 2 times daily 180 tablet 3     Lidocaine (LIDOCARE) 4 % Patch Place 1-3 patches onto the skin every 24 hours To prevent lidocaine toxicity, patient should be patch free for 12 hrs daily. 20 patch 0     medical cannabis (Patient's own supply.  Not a prescription) Take 1 Dose by mouth See Admin Instructions Capsule formulation - uses as needed       metoclopramide (REGLAN) 10 MG tablet Take 1 tablet (10 mg) by mouth 4 times daily as needed (headache) 40 tablet 3     Multiple Vitamins-Minerals (MULTIVITAMIN PO) Take 1 tablet by mouth daily        naloxone (NARCAN) 4 MG/0.1ML nasal spray Spray 1 spray (4 mg) into one nostril alternating nostrils once as needed for opioid reversal every 2-3 minutes until assistance arrives 0.2 mL 0     naproxen sodium (ANAPROX) 220 MG tablet Take 440-660 mg by mouth daily as needed for moderate pain       Probiotic Product (PROBIOTIC DAILY PO) Take 2 packets by mouth every morning        propranolol ER (INDERAL LA) 120 MG 24 hr capsule Take 1 capsule (120 mg) by mouth three times a week 39 capsule 3     Rhubarb (ESTROVEN COMPLETE) 4 MG TABS Take 1 tablet by mouth       rizatriptan (MAXALT-MLT) 5 MG ODT TAKE 1-2 TABLETS (5-10 MG) BY MOUTH AT ONSET OF HEADACHE FOR MIGRAINE  (MAX 30 MG IN 24 HOURS) 18 tablet 11     SUMAtriptan (IMITREX STATDOSE) 6 MG/0.5ML pen injector kit Inject 0.5 ml (6 mg) subcutaneously at onset of migraine, May repeat in 1 hour , Max 12 mg/24 hrs 2 kit 11     tiZANidine (ZANAFLEX) 4 MG tablet Take 1 tablet (4 mg) by mouth 4 times daily as needed for muscle spasms 120 tablet 1     traMADol (ULTRAM) 50 MG tablet Take 1 tablet (50 mg) by mouth every 6 hours as needed for severe pain 60 tablet 1     vitamin B complex with vitamin C (STRESS TAB) tablet Take 1 tablet by mouth daily       VITAMIN D, CHOLECALCIFEROL, PO Take 2,000 Units by mouth daily         Problem List:  Patient Active Problem List    Diagnosis Date Noted     Cervical radiculopathy 10/14/2022     Priority: Medium     Added automatically from request for surgery [id number]       Chronic sacroiliac joint pain 08/06/2021     Priority: Medium     Added automatically from request for surgery [id number]       Chronic right shoulder pain 02/17/2021     Priority: Medium     Arthropathy of cervical facet joint 01/25/2021     Priority: Medium     Added automatically from request for surgery [id number]       ERRONEOUS ENCOUNTER--DISREGARD 12/22/2020     Priority: Medium     Cervical pain 09/29/2020     Priority: Medium     Chronic pain of both shoulders 09/29/2020     Priority: Medium     Cervical facet joint syndrome 09/29/2020     Priority: Medium     Added automatically from request for surgery [id number]       Facet arthropathy, cervical 09/02/2020     Priority: Medium     Added automatically from request for surgery [id number]       Hiatal hernia 02/10/2020     Priority: Medium     s/p hiatal hernia repair, nissen, PEG, 2/10 01/22/2020     Priority: Medium     Added automatically from request for surgery [id number]       POTS (postural orthostatic tachycardia syndrome) 10/23/2019     Priority: Medium     Autonomic dysfunction 06/07/2018     Priority: Medium     Low back pain of multiple sites of spine with sciatica 12/09/2017      Priority: Medium     Intractable chronic migraine without aura and without status migrainosus 12/09/2017     Priority: Medium     Obese 12/09/2017     Priority: Medium     Urinary urgency 08/30/2017     Priority: Medium     Nocturia 08/30/2017     Priority: Medium     Cervicalgia 08/25/2017     Priority: Medium     Headache 07/25/2017     Priority: Medium     Postlaminectomy syndrome, lumbar region 06/14/2017     Priority: Medium     Lymphadenopathy of left cervical region 03/27/2017     Priority: Medium     Easy bruising 10/13/2016     Priority: Medium     Head and neck lymphadenopathy 10/13/2016     Priority: Medium     Hepatomegaly 10/13/2016     Priority: Medium     Liver lesion 10/13/2016     Priority: Medium     Chronic left shoulder pain 09/05/2016     Priority: Medium     s/p TLIF L4-5,L5-S1 with solid arthrodesis 09/05/2016     Priority: Medium     Tom-Danlos syndrome 09/05/2016     Priority: Medium     Acquired hypothyroidism 07/22/2016     Priority: Medium     Anxiety 07/22/2016     Priority: Medium     Opioid dependence (H) 07/22/2016     Priority: Medium     Overview:   Treatment Agreement       Essential hypertension with goal blood pressure less than 140/90 07/22/2016     Priority: Medium     Low back pain 07/12/2016     Priority: Medium     Lumbar radiculopathy 02/18/2016     Priority: Medium     Encounter for genetic counseling and testing 01/05/2016     Priority: Medium     Overview:     [facility] Aortopathy Comprehensive Panel ordered 1/5/2016.       Sinus tachycardia 09/29/2015     Priority: Medium     Astigmatism 12/12/2014     Priority: Medium     Keratoconjunctivitis sicca (H) 12/12/2014     Priority: Medium     Presbyopia 12/12/2014     Priority: Medium     Opioid type dependence, continuous (H) 10/09/2014     Priority: Medium     Other acute postoperative pain 10/09/2014     Priority: Medium     Anxiety disorder 08/21/2014     Priority: Medium     Pain disorder associated with  psychological and physical factors 08/21/2014     Priority: Medium     Low back pain radiating to both legs 07/08/2014     Priority: Medium     Arthritis of facet joints at multiple vertebral levels 01/01/2014     Priority: Medium     Spondylolisthesis of lumbar region 12/10/2013     Priority: Medium     Lumbar degenerative disc disease 11/21/2013     Priority: Medium     DNS (deviated nasal septum) 10/09/2012     Priority: Medium     Nasal turbinate hypertrophy 10/09/2012     Priority: Medium     Dysuria 07/12/2011     Priority: Medium     Chronic sinusitis with recurrent bronchitis 02/01/2007     Priority: Medium     Allergic rhinitis 01/09/2007     Priority: Medium     Cyst of fallopian tube 12/28/1996     Priority: Medium     Arthritis involving multiple sites 01/01/1986     Priority: Medium     Uterine endometriosis 01/01/1986     Priority: Medium        Past Medical/Surgical History:  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     Cervicalgia      Chronic sinusitis 00/2007    Diagnosed with chronic pansinusitis 2016     Coagulation disorder (H)     factor 8     CSF leak     Chiari malformation     Depression      Tom-Danlos disease      Eosinophilic esophagitis 08/2018     Gastroesophageal reflux disease 3/1/2017    I have large hiatal hernia     Hiatal hernia 2016    Have adeline hiatel hernia     Hoarseness Unsure    It comes and goes     Hypertension      Hypothyroid      IBS (irritable bowel syndrome) with constipation     improved on Linzess     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     Irregular heart beat      Migraines 1/1/2007    Unsure of exact date     Nasal polyps 2013     Were revoved 03/2017, benign     Orthostatic hypotension      Other nervous system complications 1/2014    Autonomic nervous system disorder, neuralgia, neuropathy     Swelling, mass, or lump in head and neck 08/2016    Lymph node has been growing for last year     Thyroid disease 01/01/2008     Urinary incontinence      Urinary urgency      Past Surgical History:   Procedure Laterality Date     AS REPAIR OF NASAL SEPTUM  2009    repair broke nose     BACK SURGERY  12/09/2013  10/01/2014    Spinal fusion L4-S1, L5 moving 5/8ths in. Remove screws/rods     BIOPSY  01/01/2008 & 3/2017    mole biopsy, lymph node biopsy     COLONOSCOPY  3/2017    Colonoscopy and GI     ESOPHAGEAL IMPEDENCE FUNCTION TEST WITH 24 HOUR PH GREATER THAN 1 HOUR N/A 9/17/2019    Procedure: IMPEDANCE PH STUDY, ESOPHAGUS, 24 HOUR;  Surgeon: Raghavendra Grande MD;  Location: [facility]     ESOPHAGOSCOPY, GASTROSCOPY, DUODENOSCOPY (EGD), COMBINED N/A 8/3/2018    Procedure: COMBINED ESOPHAGOSCOPY, GASTROSCOPY, DUODENOSCOPY (EGD), BIOPSY SINGLE OR MULTIPLE;;  Surgeon: Juan Woodson MD;  Location: [facility]     ESOPHAGOSCOPY, GASTROSCOPY, DUODENOSCOPY (EGD), COMBINED N/A 10/22/2018    Procedure: COMBINED ESOPHAGOSCOPY, GASTROSCOPY, DUODENOSCOPY (EGD), BIOPSY SINGLE OR MULTIPLE;  Surgeon: Sadia Carolina MD;  Location: [facility]     ESOPHAGOSCOPY, GASTROSCOPY, DUODENOSCOPY (EGD), COMBINED N/A 12/17/2018    Procedure: COMBINED ESOPHAGOSCOPY, GASTROSCOPY, DUODENOSCOPY (EGD);  Surgeon: Juan Woodson MD;  Location: [facility]     ESOPHAGOSCOPY, GASTROSCOPY, DUODENOSCOPY (EGD), COMBINED N/A 10/14/2022    Procedure: ESOPHAGOGASTRODUODENOSCOPY, WITH BIOPSY;  Surgeon: Jay Shaffer MD;  Location: [facility]     INJECT BLOCK MEDIAL BRANCH CERVICAL/THORACIC/LUMBAR Left 9/24/2020    Procedure: BLOCK, NERVE, FACET JOINT, MEDIAL BRANCH, DIAGNOSTIC;  Surgeon: Faiza Martinez MD;  [facility]     INJECT BLOCK MEDIAL BRANCH CERVICAL/THORACIC/LUMBAR Bilateral 10/8/2020     Procedure: Bilateral cervical 3, cervical 4, and cervical 5 medial branch nerve block;  Surgeon: Faiza Martinez MD;  Location: [facility]     INJECT BLOCK MEDIAL BRANCH CERVICAL/THORACIC/LUMBAR Right 3/4/2021    Procedure: Cervical 3, cervical 4, cervical 5 medial branch nerve blocks to target C3-C4 and C4-C5 facet joint;  Surgeon: Faiza Martinez MD;  Location: [facility] OR     LAPAROSCOPIC HERNIORRHAPHY HIATAL N/A 2/10/2020    Procedure: LAPAROSCOPIC HIATAL HERNIA REPAIR, NISSEN FUNDOPLICATION, ESOPHOGASTRODUODENOSCOPY, PEG TUBE PLACEMENT.;  Surgeon: Alana Mack MD;  Location: [facility]     NASAL/SINUS POLYPECTOMY  2017    Polyps were benign     NOSE SURGERY      deviated septum     TONSILLECTOMY & ADENOIDECTOMY  1981     TUBAL LIGATION  2011       Social History:  Social History     Socioeconomic History     Marital status:      Spouse name: Carry     Number of children: 5     Years of education: Not on file     Highest education level: Not on file   Occupational History     Not on file   Tobacco Use     Smoking status: Former     Packs/day: 0.20     Years: 10.00     Pack years: 2.00     Types: Cigarettes     Start date: 1991     Quit date: 2013     Years since quittin.8     Smokeless tobacco: Never   Vaping Use     Vaping Use: Never used   Substance and Sexual Activity     Alcohol use: Yes     Comment: once a month     Drug use: Yes     Types: Marijuana     Comment: medical marijuana     Sexual activity: Yes     Partners: Male     Birth control/protection: Female Surgical   Other Topics Concern     Parent/sibling w/ CABG, MI or angioplasty before 65F 55M? No   Social History Narrative    5 kids: 26 son, 25 daughter, 21 daughter, 15 Leslie, 9 Ibeth yo     works at [employer] for disability        3 yo and 6 months grandchildren Marcelo and Jennifer     Social Determinants of Health     Financial Resource Strain: Not on file   Food Insecurity: Not on file   Transportation  "Needs: Not on file   Physical Activity: Not on file   Stress: Not on file   Social Connections: Not on file   Intimate Partner Violence: Not on file   Housing Stability: Not on file       Family History:  Family History   Problem Relation Age of Onset     Connective Tissue Disorder Mother         eds     Connective Tissue Disorder Sister         eds     Cancer Father         Spinal tumor grew into spinal cord, went in brain     Migraines Father      Diabetes Maternal Grandmother         Elderly diabetes     Heart Disease Maternal Grandmother      Hypertension Maternal Grandmother      Diabetes Paternal Grandmother         Elderly diabetes     Diabetes Paternal Grandfather         Elderly diabetes     Asthma Sister         Pediatric Asthma     Migraines Sister      Asthma Son         Pediatric Asthma     Thyroid Disease Sister         ,     Migraines Sister      Migraines Sister      Breast Cancer No family hx of        Physical Examination:  Vitals: /63   Ht 1.702 m (5' 7\")   Wt 77.1 kg (170 lb)   LMP  (LMP Unknown)   BMI 26.63 kg/m    BMI= Body mass index is 26.63 kg/m .  General appearance: Awake, alert, cooperative. Well groomed. Sitting comfortably in chair. In no apparent distress.  HEENT: Head: Normocephalic, atraumatic. Eyes:Conjunctiva clear. Sclera normal. Nose: External appearance without deformity.   Pulmonary:  Able to speak easily in full sentences. No cough or wheeze.   Skin:  No rashes or significant lesions on visible skin.   Neurologic: Alert, oriented x3.   Psychiatric: Mood euthymic. Affect congruent with full range and intensity.         Data: All pertinent previous laboratory data reviewed     Recent Labs   Lab Test 11/13/20  1133 02/12/20  0611    139   POTASSIUM 4.5 3.6   CHLORIDE 102 106   CO2 29 27   ANIONGAP 4 6   GLC 96 106*   BUN 10 6*   CR 0.83 0.62   JUAN 8.6 8.0*       Recent Labs   Lab Test 06/24/22  1122   WBC 6.5   RBC 4.74   HGB 14.8   HCT 43.5   MCV 92   MCH 31.2 "   MCHC 34.0   RDW 12.1          Recent Labs   Lab Test 11/13/20  1133   PROTTOTAL 7.1   ALBUMIN 3.9   BILITOTAL 0.6   ALKPHOS 94   AST 16   ALT 23       TSH (mU/L)   Date Value   03/10/2022 1.34   11/13/2020 3.00   09/21/2020 5.11 (H)       Benzodiazepine Qual Urine (no units)   Date Value   09/27/2016     Negative   Cutoff for a negative benzodiazepine is 200 ng/mL or less.       Cannabinoids Qual Urine (no units)   Date Value   09/27/2016     Negative   Cutoff for a negative cannabinoid is 50 ng/mL or less.       Cocaine Qual Urine (no units)   Date Value   09/27/2016     Negative   Cutoff for a negative cocaine is 300 ng/mL or less.       Opiates Qualitative Urine (no units)   Date Value   09/27/2016 (A)    Positive   Cutoff for a positive opiate is greater than 300 ng/mL. This is an unconfirmed   screening result to be used for medical purposes only.         Iron Saturation Index   Date/Time Value Ref Range Status   01/30/2020 01:25 PM Unable to Calculate 15 - 46 % Final     Iron Sat Index   Date/Time Value Ref Range Status   06/24/2022 11:22 AM 28 15 - 46 % Final     Ferritin   Date/Time Value Ref Range Status   06/24/2022 11:22 AM 21 8 - 252 ng/mL Final   11/13/2020 11:33 AM 4 (L) 8 - 252 ng/mL Final       No results found for: PH, PHARTERIAL, PO2, [id number], SAT, PCO2, HCO3, BASEEXCESS, TAHIRA, BEB    @LABRCNTIPR(phv:4,pco2v:4,po2v:4,hco3v:4,neftali:4,o2per:4)@    Echocardiology: No results found for this or any previous visit (from the past 4320 hour(s)).    Chest x-ray: No results found for this or any previous visit from the past 365 days.      Chest CT: CT Chest Abdomen w/o Contrast 08/23/2022    Narrative  EXAMINATION: CT CHEST ABDOMEN W/O CONTRAST, 8/23/2022 10:33 AM    TECHNIQUE:  Helical CT images from the lung apices through the iliac  crests were obtained without contrast.  Coronal and sagittal  reformatted images were generated at a workstation for further  assessment.    CONTRAST:  None.    COMPARISON: Upper GI 4/21/2022, chest CT 4/26/2018    HISTORY: Hiatal hernia    FINDINGS:    Chest:  Mediastinum: Postsurgical changes of hiatal hernia repair with Nissen  fundoplication. No definite residual hiatal hernia.    The thyroid gland is unremarkable. Common origin of the  brachiocephalic and left common carotid arteries, normal variant. The  heart is not enlarged. No significant pericardial effusion. The  ascending aorta and main pulmonary artery are normal in caliber. No  mediastinal or axillary lymphadenopathy.    Lungs: The central tracheobronchial tree is patent. No pleural  effusion, pneumothorax, or focal consolidation. Mild biapical  scarring. A few pulmonary nodules are stable compared to 4/26/2018,  including a 4 mm solid pulmonary nodule in the lateral right lower  lobe (series 4, image 145) and a 3 mm solid pulmonary nodule in the  lateral right lower lobe (series 4, image 147). No new or enlarging  pulmonary nodules.    Abdomen:  Liver: No suspicious lesions on this noncontrast examination.  Gallbladder: No cholelithiasis or evidence of acute cholecystitis. No  intra- or extra-hepatic biliary ductal dilatation.  Spleen: Accessory splenule.  Pancreas: Unremarkable.  Adrenal glands: No discrete nodules.  Kidneys: No hydronephrosis. No nephrolithiasis. No suspicious mass.  Bowel: Duodenal diverticulum. The visualized bowel is normal in  caliber.  Lymph nodes: No suspicious lymphadenopathy.  Peritoneum / Retroperitoneum: No pneumoperitoneum. No organized fluid  collections.  Abdominal vasculature: No abdominal aortic aneurysm. Patency cannot be  evaluated on this noncontrast examination.    Bones and soft tissues: Chronic appearing left rib fracture deformity.  Postsurgical changes of the lumbar spine. Degenerative changes of the  visualized spine. No acute osseous abnormalities or suspicious bony  lesions. Small fat containing umbilical hernia.    Impression  IMPRESSION: Postsurgical  changes of Nissen fundoplication. No  significant residual hiatal hernia on CT.    I have personally reviewed the examination and initial interpretation  and I agree with the findings.    JOSÉ MIGUEL GAYLE MD      SYSTEM ID:  [id number]      PFT: Most Recent [facility] Pulmonary Function Testing    No results found for: 20001  No results found for: 20002  No results found for: 20003  No results found for: 20015  No results found for: 20016  No results found for: 20027  No results found for: 20028  No results found for: 20029  No results found for: 20079  No results found for: 20080  No results found for: 20081  No results found for: 20335  No results found for: 20105  No results found for: 20053  No results found for: 20054  No results found for: 20055      Chely Montoya PA-C 10/17/2022     [facility]  [address] [address]     [facility]  [address] [address]    Chart documentation was completed, in part, with [employer] voice-recognition software. Even though reviewed, some grammatical, spelling, and word errors may remain.    82 minutes spent on day of encounter reviewing medical records, history and physical examination, review of previous testing and interpretation, documentation and further activities as noted above

## 2022-10-27 ENCOUNTER — ANCILLARY PROCEDURE (OUTPATIENT)
Dept: RADIOLOGY | Facility: AMBULATORY SURGERY CENTER | Age: 52
End: 2022-10-27
Attending: ANESTHESIOLOGY
Payer: COMMERCIAL

## 2022-10-27 ENCOUNTER — DOCUMENTATION ONLY (OUTPATIENT)
Dept: INTERNAL MEDICINE | Facility: CLINIC | Age: 52
End: 2022-10-27

## 2022-10-27 ENCOUNTER — HOSPITAL ENCOUNTER (OUTPATIENT)
Facility: AMBULATORY SURGERY CENTER | Age: 52
Discharge: HOME | End: 2022-10-27
Attending: ANESTHESIOLOGY | Admitting: ANESTHESIOLOGY
Payer: COMMERCIAL

## 2022-10-27 VITALS
DIASTOLIC BLOOD PRESSURE: 94 MMHG | HEART RATE: 49 BPM | WEIGHT: 170 LBS | RESPIRATION RATE: 16 BRPM | OXYGEN SATURATION: 97 % | SYSTOLIC BLOOD PRESSURE: 133 MMHG | BODY MASS INDEX: 26.63 KG/M2 | TEMPERATURE: 97.2 F

## 2022-10-27 DIAGNOSIS — M54.12 CERVICAL RADICULOPATHY: ICD-10-CM

## 2022-10-27 PROCEDURE — 62321 NJX INTERLAMINAR CRV/THRC: CPT | Mod: GC | Performed by: ANESTHESIOLOGY

## 2022-10-27 PROCEDURE — 62321 NJX INTERLAMINAR CRV/THRC: CPT

## 2022-10-27 RX ORDER — IOPAMIDOL 408 MG/ML
INJECTION, SOLUTION INTRATHECAL DAILY PRN
Status: DISCONTINUED | OUTPATIENT
Start: 2022-10-27 | End: 2022-10-27 | Stop reason: HOSPADM

## 2022-10-27 RX ORDER — BUPIVACAINE HYDROCHLORIDE 2.5 MG/ML
INJECTION, SOLUTION EPIDURAL; INFILTRATION; INTRACAUDAL DAILY PRN
Status: DISCONTINUED | OUTPATIENT
Start: 2022-10-27 | End: 2022-10-27 | Stop reason: HOSPADM

## 2022-10-27 RX ORDER — FAMOTIDINE 20 MG/1
20 TABLET, FILM COATED ORAL AT BEDTIME
COMMUNITY
Start: 2022-08-05 | End: 2023-04-04

## 2022-10-27 RX ORDER — LIDOCAINE HYDROCHLORIDE 10 MG/ML
INJECTION, SOLUTION EPIDURAL; INFILTRATION; INTRACAUDAL; PERINEURAL DAILY PRN
Status: DISCONTINUED | OUTPATIENT
Start: 2022-10-27 | End: 2022-10-27 | Stop reason: HOSPADM

## 2022-10-27 RX ORDER — DEXAMETHASONE SODIUM PHOSPHATE 10 MG/ML
INJECTION INTRAMUSCULAR; INTRAVENOUS DAILY PRN
Status: DISCONTINUED | OUTPATIENT
Start: 2022-10-27 | End: 2022-10-27 | Stop reason: HOSPADM

## 2022-10-27 RX ORDER — DIAZEPAM 5 MG
5-10 TABLET ORAL
Status: COMPLETED | OUTPATIENT
Start: 2022-10-27 | End: 2022-10-27

## 2022-10-27 RX ORDER — PANTOPRAZOLE SODIUM 40 MG/1
TABLET, DELAYED RELEASE ORAL
COMMUNITY
Start: 2022-09-30 | End: 2023-04-04

## 2022-10-27 RX ADMIN — DIAZEPAM 10 MG: 5 TABLET ORAL at 12:27

## 2022-10-27 NOTE — DISCHARGE INSTRUCTIONS
Home Care Instructions after an Epidural Steroid Pain Injection    A lumbar epidural steroid injection delivers steroid medication directly into the area that may be causing your lower back pain and/or leg pain. A cervical or thoracic epidural steroid injection delivers steroids into the epidural space surrounding spinal nerve roots to help relieve pain in the upper spine/neck.    Activity  -Rest today  -Do not work today  -Resume normal activity tomorrow  -DO NOT shower for 24 hours  -DO NOT remove bandaid for 24 hours    Pain  -You may experience soreness at the injection site for one or two days  -You may use an ice pack for 20 minutes every 2 hours for the first 24 hours  -You may use a heating pad after the first 24 hours  -You may use Tylenol (acetaminophen) every 4 hours or other pain medicines as     directed by your physician    You may experience numbness radiating into your legs or arms (depending on the procedure location). This numbness may last several hours. Until sensation returns to normal; please use caution in walking, climbing stairs, and stepping out of your vehicle, etc.      Common side effects of steroids:  Not everyone will experience corticosteroid side effects. If side effects are experienced, they will gradually subside in the 7-10 day period following an injection. Most common side effects include:  -Flushed face and/or chest  -Feeling of warmth, particularly in the face but could be an overall feeling of warmth  -Increased blood sugar in diabetic patients  -Menstrual irregularities my occur. If taking hormone-based birth control an alternate method of birth control is recommended  -Sleep disturbances and/or mood swings are possible  -Leg cramps    Please contact us if you have:  -Severe pain  -Fever more than 101.5 degrees Fahrenheit  -Signs of infection at the injection site (redness, swelling, or drainage)    FOR PAIN CENTER PATIENTS:  If you have questions, please contact the Pain  Clinic at [phone] Option #1 between the hours of 7:00 am and 3:00 pm Monday through Friday. After office hours you can contact the on call provider by dialing [phone]. If you need immediate attention, we recommend that you go to a hospital emergency room or dial [phone].      FOR PM&R PATIENTS:  For patients seen by the PM and R service, please call [phone]. If you need to fax a pain diary to PM&R the fax number is [phone]. If you are unable to fax, uploading to [employer] is encouraged, then send to provider. If you have procedure scheduling questions please call [phone] Option #2.

## 2022-10-27 NOTE — PROGRESS NOTES
Type of Form Received: HOUSE CALL REPORT    Form Received (Date) 10/27/22   Form Filled out No   Placed in provider folder Yes

## 2022-10-27 NOTE — OP NOTE
Patient: Ada Welch Age: 51 year old   MRN: [id number] Attending: Dr. Martinez     Date of Visit: October 27, 2022      PAIN MEDICINE CLINIC PROCEDURE NOTE    ATTENDING CLINICIAN:    Faiaz Martinez MD    Fellow:  Rajan Saxena MD    PREPROCEDURE DIAGNOSES:  1.  Cervical radiculopathy  2.  Chronic neck pain radiating to the upper extremity        PROCEDURE(S) PERFORMED:  1.  Interlaminar cervical epidural steroid injection  2.  Fluoroscopic guidance for the above-named procedure(s)      ANESTHESIA:  Local.    BLOOD LOSS:  Minimal.    DRAINS AND SPECIMENS:  None.    COMPLICATIONS:  None.    INDICATIONS:  Ada Welch is a 51 year old female with a history of  chronic neck pain radiating to the upper extremity.  Her MRI of the cervical spine performed in 7/9/2022 shows moderate right and mild left neuroforaminal stenosis at C5-C6 and C6-C7.    The patient stated that the patient was in their usual state of health and denied recent anticoagulant use or recent infections.  Therefore, the plan is for the above mentioned procedure.     Procedure Details:  The patient was met in the procedure room, where the patient was identified by name, medical record number and date of birth.  All of the patient s last minute questions were answered. Written informed consent was obtained and saved in the electronic medical record, after the risks, benefits, and alternatives were discussed with the patient.      A formal time-out procedure was performed, as per protocol, including patient name, title of procedure, and site of procedure, and all in the room concurred.  Routine monitors were applied.      The patient was placed in the prone position on the procedure room table.  All pressure points were checked and comfortably padded.  Routine monitors were placed.  Vital signs were stable.    A chlorhexidine prep was completed followed by sterile draping per standard procedure.     The AP fluoroscopic view was optimized for  approach at  C7-T1 interspace.  The skin over the interspace was infiltrated with 4-5 mL of 1% Lidocaine using a 25 gauge, 1.5 inch needle.  A 22-gauge 3-1/2 inch Tuohy needle was advanced midline between C7-T1 interlaminar space using the loss of resistance technique with preservative free normal saline with fluoroscopic guidance. Mutiple AP, contralateral oblique, and lateral fluoroscopic images are taken as Tuohy needle was advanced to the epidural space. The epidural space was identified, without evidence of blood, cerebrospinal fluid, or parasthesia throughout. Needle tip placement within the epidural space was further confirmed with 1-2 mL of nonionic contrast agent, with the epidural space visualized in the AP, contralateral oblique, and lateral fluoroscopic view(s) with appropriate spread of the agent with fat vacuolization and no intravascular or intrathecal spread noted.  Next, 6 mL of a treatment solution containing 1 mL of preservative dexamethasone 10mg/ml, 1 mL 0.25% and 4 mL preservative free normal saline was administered. The needle was withdrawn.    Light pressure was held at the puncture site(s) to prevent ecchymosis and oozing.  The patient's skin was cleansed, and hemostasis was confirmed.  Band-aids were applied to the needle injection site(s).      Condition:    The patient remained awake and alert throughout the procedure.  The patient tolerated the procedure well and was monitored for approximately 15 minutes afterward in the post procedure area.  There were no immediate post procedure complications noted.  The patient was then discharged to home as per protocol.      Pre-procedure pain score: 7/10  Post-procedure pain score: 6/10

## 2022-10-28 DIAGNOSIS — M54.12 CERVICAL RADICULOPATHY: ICD-10-CM

## 2022-10-28 DIAGNOSIS — G43.719 INTRACTABLE CHRONIC MIGRAINE WITHOUT AURA AND WITHOUT STATUS MIGRAINOSUS: Primary | ICD-10-CM

## 2022-10-28 RX ORDER — BUTALBITAL, ACETAMINOPHEN AND CAFFEINE 50; 325; 40 MG/1; MG/1; MG/1
1 TABLET ORAL EVERY 6 HOURS PRN
Qty: 10 TABLET | Refills: 0 | COMMUNITY
Start: 2022-10-28 | End: 2023-11-14

## 2022-11-05 DIAGNOSIS — M96.1 POST LAMINECTOMY SYNDROME: ICD-10-CM

## 2022-12-20 ENCOUNTER — VIRTUAL VISIT (OUTPATIENT)
Dept: NEUROLOGY | Facility: CLINIC | Age: 52
End: 2022-12-20
Payer: COMMERCIAL

## 2022-12-20 DIAGNOSIS — G25.71 AKATHISIA: Primary | ICD-10-CM

## 2022-12-20 PROCEDURE — 99214 OFFICE O/P EST MOD 30 MIN: CPT | Mod: 95 | Performed by: PSYCHIATRY & NEUROLOGY

## 2022-12-20 NOTE — PROGRESS NOTES
[facility] Neurology Follow Up Visit    Ada Welch MRN# [id number]   Age: 52 year old YOB: 1970     Brief history of symptoms: The patient was initially seen in neurologic consultation on 6/24/2022 for evaluation of numbness in legs. Please see the comprehensive neurologic consultation notes from those dates in the Epic records for details.     Notable prior studies were as follows:  - EMG on 5/24/2021 with Dr. 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  - EMG 4/5/2018 of lower extremities was normal  - 3 hour EEG 4/2019 for myoclonic jerks/tics of left face was normal (captured event of body stiffening and left hand numbness).  Jerks were thought to be exaggerated hypnic jerks.  - Facial EMG 9/26/2019 was normal    After our visit, it seemed like the patient has ongoing akathisia possibly related to metoclopramide with duloxetine. There was little to support ongoing lumbar radiculopathy or distal symmetric polyneuropathy being present. Given her restlessness, she was to obtain some serum studies of iron/ferritin in looking for periodic limb movement disorder as well as be seen with sleep medicine for issues of always being tired and having restlessness.    Interval history:   - iron and iron binding, ferritin, CBC were done 6/24/2022 and normal  - She was seen with her PCP 7/21/2022 who started her on doxepin for psychological insomnia  - Seen with sleep medicine 10/17/2022 with recommendation for sleep psychology and sleep study to be done    Today, the patient stopped taking reglan and didn't necessarily have worsened jerking issues of her feet or body.  The jerking doesn't hurt her, doesn't necessarily progress to a functional deficits (she can work through it but it is annoying).  She is having some issues of temperature regulation (feels to hot or feels to hot) when  exposed to varied temperature.  She does feel like her recent SUNSHINE has led to improvement in her sensory issues of the hands and is quite pleased with the results.  She is hoping to avoid lumbar or cervical surgeries at the moment.     Physical Exam:   General: Seated comfortably in no acute distress. Well appearing, accurate historian.  Neurologic:     Mental Status: Fully alert, attentive and oriented. Speech clear and fluent, no paraphasic errors.     Cranial Nerves: EOMI with normal smooth pursuit. Facial movements symmetric. Hearing not formally tested but intact to conversation.  No dysarthria.     Motor: No tremors or other abnormal movements observed.          Assessment and Plan:   Assessment:  Leg restlessness and numbness    At her last visit, there was no evidence for polyneuropathy or issues seen with a lumbar radiculopathy that is new or progressive.  I suspect that issues of numbness may be more vascular related given her dysautonomia than neuropathic in origin, especially given her issues of temperature.  The restlessness she experiences is not necessarily related to her Reglan, or iron related etiologies, and at this time it isn't as prevalent as when we talked last.  I think with continued sleep improvements, her sensory issues of the legs and restlessness may follow.       Plan:  - No EMG at this time  - Continue with management of migraine through Dr. Nguyen, as well as continue with management of complex pain with Physiatry and your pain clinic providers    Follow up in Neurology clinic as needed should new concerns arise.    RIKY Domingo D.O.   of Neurology    Total time today (30 min) in this patient encounter was spent on pre-charting, counseling and/or coordination of care.

## 2022-12-20 NOTE — PROGRESS NOTES
Ada is a 52 year old who is being evaluated via a billable video visit.      How would you like to obtain your AVS? Mychart  If the video visit is dropped, the invitation should be resent by: [phone]        Video-Visit Details    Video Start Time: 0830    Type of service:  Video Visit    Video End Time:8:51 AM    Originating Location (pt. Location): Home        Distant Location (provider location):  On-site    Platform used for Video Visit: [employer]

## 2022-12-20 NOTE — LETTER
12/20/2022       RE: Ada Welch  [address] [address]     Dear Colleague,    Thank you for referring your patient, Ada Welch, to the [facility]UROLOG[facility]. Please see a copy of my visit note below.    [facility] Neurology Follow Up Visit    Ada Welch MRN# [id number]   Age: 52 year old YOB: 1970     Brief history of symptoms: The patient was initially seen in neurologic consultation on 6/24/2022 for evaluation of numbness in legs. Please see the comprehensive neurologic consultation notes from those dates in the Epic records for details.     Notable prior studies were as follows:  - EMG on 5/24/2021 with Dr. 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  - EMG 4/5/2018 of lower extremities was normal  - 3 hour EEG 4/2019 for myoclonic jerks/tics of left face was normal (captured event of body stiffening and left hand numbness).  Jerks were thought to be exaggerated hypnic jerks.  - Facial EMG 9/26/2019 was normal    After our visit, it seemed like the patient has ongoing akathisia possibly related to metoclopramide with duloxetine. There was little to support ongoing lumbar radiculopathy or distal symmetric polyneuropathy being present. Given her restlessness, she was to obtain some serum studies of iron/ferritin in looking for periodic limb movement disorder as well as be seen with sleep medicine for issues of always being tired and having restlessness.    Interval history:   - iron and iron binding, ferritin, CBC were done 6/24/2022 and normal  - She was seen with her PCP 7/21/2022 who started her on doxepin for psychological insomnia  - Seen with sleep medicine 10/17/2022 with recommendation for sleep psychology and sleep study to be done    Today, the patient  stopped taking reglan and didn't necessarily have worsened jerking issues of her feet or body.  The jerking doesn't hurt her, doesn't necessarily progress to a functional deficits (she can work through it but it is annoying).  She is having some issues of temperature regulation (feels to hot or feels to hot) when exposed to varied temperature.  She does feel like her recent SUNSHINE has led to improvement in her sensory issues of the hands and is quite pleased with the results.  She is hoping to avoid lumbar or cervical surgeries at the moment.     Physical Exam:   General: Seated comfortably in no acute distress. Well appearing, accurate historian.  Neurologic:     Mental Status: Fully alert, attentive and oriented. Speech clear and fluent, no paraphasic errors.     Cranial Nerves: EOMI with normal smooth pursuit. Facial movements symmetric. Hearing not formally tested but intact to conversation.  No dysarthria.     Motor: No tremors or other abnormal movements observed.          Assessment and Plan:   Assessment:  Leg restlessness and numbness    At her last visit, there was no evidence for polyneuropathy or issues seen with a lumbar radiculopathy that is new or progressive.  I suspect that issues of numbness may be more vascular related given her dysautonomia than neuropathic in origin, especially given her issues of temperature.  The restlessness she experiences is not necessarily related to her Reglan, or iron related etiologies, and at this time it isn't as prevalent as when we talked last.  I think with continued sleep improvements, her sensory issues of the legs and restlessness may follow.       Plan:  - No EMG at this time  - Continue with management of migraine through Dr. Nguyen, as well as continue with management of complex pain with Physiatry and your pain clinic providers    Follow up in Neurology clinic as needed should new concerns arise.      RIKY Domingo D.O.   of  Neurology    Total time today (30 min) in this patient encounter was spent on pre-charting, counseling and/or coordination of care.

## 2022-12-28 DIAGNOSIS — M96.1 POSTLAMINECTOMY SYNDROME: ICD-10-CM

## 2022-12-29 DIAGNOSIS — M96.1 POSTLAMINECTOMY SYNDROME: ICD-10-CM

## 2022-12-29 RX ORDER — TRAMADOL HYDROCHLORIDE 50 MG/1
TABLET ORAL
Qty: 60 TABLET | Refills: 1 | Status: SHIPPED | OUTPATIENT
Start: 2022-12-29 | End: 2023-02-16

## 2022-12-29 NOTE — TELEPHONE ENCOUNTER
Refill request    Medication: traMADol (ULTRAM) 50 MG tablet    Sig: Take 1 tablet (50 mg) by mouth every 6 hours as needed for severe pain     Dispensed: 60  Refills: 1  SOLD to the pt on: 10/13/22, 11/7/22, 11/30/22  *Last refills as listed by the pharmacist.     Last clinic appointment: 10/13/22  Next clinic appointment: Not scheduled, Pt reminded to schedule on MyChart.       Last Drug Screen Collected: 8/5/21  Controlled Substance Agreement signed: 8/5/21      Preferred pharmacy:    [id number] - [place], [place] - [address]    *LPN called the pharmacy to review when prescription was last filled by the patient.     Mya Cash LPN

## 2022-12-30 RX ORDER — BUPRENORPHINE 10 UG/H
1 PATCH TRANSDERMAL
Qty: 4 PATCH | Refills: 1 | Status: SHIPPED | OUTPATIENT
Start: 2022-12-30 | End: 2023-03-30

## 2022-12-30 NOTE — TELEPHONE ENCOUNTER
Refill request    Medication: buprenorphine (BUTRANS) 10 MCG/HR WK patch    Sig: Place 1 patch onto the skin every 7 days    Dispensed: 4  Refills: 1  Prescribed in 10/13/22  SOLD to the pt on: UNABLE TO CHECK     Last clinic appointment: 10/13/22  Next clinic appointment: Not scheduled, Pt reminded to schedule on MyChart.         Last Drug Screen Collected: 8/5/21  Controlled Substance Agreement signed: 8/5/21        Preferred pharmacy:     [id number] - [place], [place] [address]

## 2023-01-09 ENCOUNTER — MYC MEDICAL ADVICE (OUTPATIENT)
Dept: ANESTHESIOLOGY | Facility: CLINIC | Age: 53
End: 2023-01-09

## 2023-01-09 DIAGNOSIS — M96.1 POST LAMINECTOMY SYNDROME: ICD-10-CM

## 2023-01-10 RX ORDER — AMITRIPTYLINE HYDROCHLORIDE 50 MG/1
50 TABLET ORAL AT BEDTIME
Qty: 30 TABLET | Refills: 1 | Status: SHIPPED | OUTPATIENT
Start: 2023-01-10 | End: 2023-02-14

## 2023-01-10 NOTE — TELEPHONE ENCOUNTER
Medication reordered to 50 mg as pt was directed to increase at their last appointment on 10/13/22.   Pt informed they need to schedule follow up with Dr. Martinez.     Mya Cash LPN

## 2023-01-16 ENCOUNTER — MYC MEDICAL ADVICE (OUTPATIENT)
Dept: INTERNAL MEDICINE | Facility: CLINIC | Age: 53
End: 2023-01-16

## 2023-01-18 ENCOUNTER — MYC MEDICAL ADVICE (OUTPATIENT)
Dept: INTERNAL MEDICINE | Facility: CLINIC | Age: 53
End: 2023-01-18
Payer: COMMERCIAL

## 2023-01-18 DIAGNOSIS — G90.1 DYSAUTONOMIA (H): Primary | ICD-10-CM

## 2023-01-19 NOTE — TELEPHONE ENCOUNTER
Neurology referral faxed via rightfax          Wang Spencer CMA ([facility]) at 4:20 PM on 1/19/2023

## 2023-01-19 NOTE — TELEPHONE ENCOUNTER
Dr. Barnett for dysautonomia--temperature instability, sweating, twitching.for dysautonomia--temperature instability, sweating, twitching.      Rip Isbell MD, MS    Neurology   [facility]  [address] [place] [address]  Phone: [phone]  FAX: [phone]

## 2023-01-20 ENCOUNTER — TELEPHONE (OUTPATIENT)
Dept: ANESTHESIOLOGY | Facility: CLINIC | Age: 53
End: 2023-01-20
Payer: COMMERCIAL

## 2023-01-20 NOTE — TELEPHONE ENCOUNTER
No insurance provided in urgent PA encounter. Multiple insurance plans shown in chart. Pharmacy does not open until 9 am so I am unable to initiate until I can reach the pharmacy.

## 2023-01-20 NOTE — TELEPHONE ENCOUNTER
Central Prior Authorization Team   Phone: [phone]      PA Initiation    Medication: buprenorphine (BUTRANS) 10 MCG/HR WK patch-PA initiated  Insurance Company:  [employer]  Pharmacy Filling the Rx: [id number] - [place] - [address] [place]  Filling Pharmacy Phone: [phone]  Filling Pharmacy Fax:    Start Date: 1/20/2023

## 2023-01-21 NOTE — TELEPHONE ENCOUNTER
Prior Authorization Approval    Authorization Effective Date: 1/20/2023  Authorization Expiration Date: 2/19/2023  Medication: buprenorphine (BUTRANS) 10 MCG/HR WK patch-PA APPROVED  Approved Dose/Quantity:   Reference #: [id number]   Insurance Company: [employer] - Phone [phone] Fax [phone]  Expected CoPay:       CoPay Card Available:      Foundation Assistance Needed:    Which Pharmacy is filling the prescription (Not needed for infusion/clinic administered): [id number] - [place], [place] - [address]  Pharmacy Notified: Yes  Patient Notified: No

## 2023-01-22 ENCOUNTER — HEALTH MAINTENANCE LETTER (OUTPATIENT)
Age: 53
End: 2023-01-22

## 2023-01-30 ENCOUNTER — TELEPHONE (OUTPATIENT)
Dept: INTERNAL MEDICINE | Facility: CLINIC | Age: 53
End: 2023-01-30
Payer: COMMERCIAL

## 2023-01-30 NOTE — TELEPHONE ENCOUNTER
I am not sure what this is referring to. I don't see documentation that I ordered an EMG.  Ellyn Rivera MD  Internal Medicine

## 2023-01-30 NOTE — TELEPHONE ENCOUNTER
[facility] Call Center    Phone Message    May a detailed message be left on voicemail: yes     Reason for Call: Other: [place] from EMG , calling she is wondering if she just wants follow up or all brand new testing. Please call her back as soon as possible     Action Taken: Message routed to:  [facility] pcp    Travel Screening: Not Applicable

## 2023-01-31 NOTE — TELEPHONE ENCOUNTER
Attempted to call number that was listed and it went to voicemail with no identifying information. There are no orders from PCP for further EMG testing.     Pt will need to schedule an appt with PCP to discuss further if pt would like an order. Sondra Littlejohn LPN 1/31/2023 7:43 AM

## 2023-02-03 DIAGNOSIS — M96.1 LUMBAR POST-LAMINECTOMY SYNDROME: ICD-10-CM

## 2023-02-07 RX ORDER — DULOXETIN HYDROCHLORIDE 30 MG/1
CAPSULE, DELAYED RELEASE ORAL
Qty: 270 CAPSULE | Refills: 1 | Status: SHIPPED | OUTPATIENT
Start: 2023-02-07 | End: 2023-07-25

## 2023-02-07 NOTE — TELEPHONE ENCOUNTER
Medication refilled. No changes.    Pt was last seen on 10/13/22, and follow up appointment is scheduled for 2/23/23.    Mya Cash LPN

## 2023-02-10 DIAGNOSIS — M96.1 POST LAMINECTOMY SYNDROME: ICD-10-CM

## 2023-02-12 DIAGNOSIS — M96.1 POSTLAMINECTOMY SYNDROME: ICD-10-CM

## 2023-02-14 RX ORDER — AMITRIPTYLINE HYDROCHLORIDE 50 MG/1
TABLET ORAL
Qty: 30 TABLET | Refills: 1 | Status: SHIPPED | OUTPATIENT
Start: 2023-02-14 | End: 2023-03-14

## 2023-02-16 RX ORDER — TRAMADOL HYDROCHLORIDE 50 MG/1
TABLET ORAL
Qty: 60 TABLET | Refills: 1 | Status: SHIPPED | OUTPATIENT
Start: 2023-02-16 | End: 2023-03-30

## 2023-02-17 ENCOUNTER — OFFICE VISIT (OUTPATIENT)
Dept: INTERNAL MEDICINE | Facility: CLINIC | Age: 53
End: 2023-02-17
Payer: COMMERCIAL

## 2023-02-17 ENCOUNTER — MYC MEDICAL ADVICE (OUTPATIENT)
Dept: INTERNAL MEDICINE | Facility: CLINIC | Age: 53
End: 2023-02-17

## 2023-02-17 ENCOUNTER — ANCILLARY PROCEDURE (OUTPATIENT)
Dept: GENERAL RADIOLOGY | Facility: CLINIC | Age: 53
End: 2023-02-17
Attending: INTERNAL MEDICINE
Payer: COMMERCIAL

## 2023-02-17 ENCOUNTER — LAB (OUTPATIENT)
Dept: LAB | Facility: CLINIC | Age: 53
End: 2023-02-17
Payer: COMMERCIAL

## 2023-02-17 VITALS
SYSTOLIC BLOOD PRESSURE: 158 MMHG | OXYGEN SATURATION: 99 % | HEART RATE: 82 BPM | BODY MASS INDEX: 29.95 KG/M2 | WEIGHT: 191.2 LBS | DIASTOLIC BLOOD PRESSURE: 97 MMHG

## 2023-02-17 DIAGNOSIS — E55.9 VITAMIN D DEFICIENCY: ICD-10-CM

## 2023-02-17 DIAGNOSIS — G89.29 CHRONIC MIDLINE THORACIC BACK PAIN: ICD-10-CM

## 2023-02-17 DIAGNOSIS — G90.1 DYSAUTONOMIA (H): ICD-10-CM

## 2023-02-17 DIAGNOSIS — M54.6 CHRONIC MIDLINE THORACIC BACK PAIN: ICD-10-CM

## 2023-02-17 DIAGNOSIS — Z13.220 SCREENING FOR HYPERLIPIDEMIA: ICD-10-CM

## 2023-02-17 DIAGNOSIS — E78.2 MIXED HYPERLIPIDEMIA: Primary | ICD-10-CM

## 2023-02-17 DIAGNOSIS — R79.9 ABNORMAL FINDING OF BLOOD CHEMISTRY, UNSPECIFIED: ICD-10-CM

## 2023-02-17 DIAGNOSIS — R90.89 OTHER ABNORMAL FINDINGS ON DIAGNOSTIC IMAGING OF CENTRAL NERVOUS SYSTEM: ICD-10-CM

## 2023-02-17 DIAGNOSIS — Z23 NEED FOR COVID-19 VACCINE: Primary | ICD-10-CM

## 2023-02-17 LAB
ALBUMIN SERPL BCG-MCNC: 5.2 G/DL (ref 3.5–5.2)
ALP SERPL-CCNC: 101 U/L (ref 35–104)
ALT SERPL W P-5'-P-CCNC: 21 U/L (ref 10–35)
ANION GAP SERPL CALCULATED.3IONS-SCNC: 10 MMOL/L (ref 7–15)
AST SERPL W P-5'-P-CCNC: 24 U/L (ref 10–35)
BILIRUB SERPL-MCNC: 0.6 MG/DL
BUN SERPL-MCNC: 11.9 MG/DL (ref 6–20)
CALCIUM SERPL-MCNC: 10 MG/DL (ref 8.6–10)
CHLORIDE SERPL-SCNC: 102 MMOL/L (ref 98–107)
CHOLEST SERPL-MCNC: 323 MG/DL
CREAT SERPL-MCNC: 0.8 MG/DL (ref 0.51–0.95)
DEPRECATED CALCIDIOL+CALCIFEROL SERPL-MC: 20 UG/L (ref 20–75)
DEPRECATED HCO3 PLAS-SCNC: 29 MMOL/L (ref 22–29)
GFR SERPL CREATININE-BSD FRML MDRD: 89 ML/MIN/1.73M2
GLUCOSE SERPL-MCNC: 119 MG/DL (ref 70–99)
HDLC SERPL-MCNC: 67 MG/DL
LDLC SERPL CALC-MCNC: 215 MG/DL
NONHDLC SERPL-MCNC: 256 MG/DL
POTASSIUM SERPL-SCNC: 4.8 MMOL/L (ref 3.4–5.3)
PROT SERPL-MCNC: 8.2 G/DL (ref 6.4–8.3)
SODIUM SERPL-SCNC: 141 MMOL/L (ref 136–145)
TRIGL SERPL-MCNC: 207 MG/DL
TSH SERPL DL<=0.005 MIU/L-ACNC: 2.53 UIU/ML (ref 0.3–4.2)

## 2023-02-17 PROCEDURE — 82306 VITAMIN D 25 HYDROXY: CPT | Mod: 90 | Performed by: PATHOLOGY

## 2023-02-17 PROCEDURE — 84443 ASSAY THYROID STIM HORMONE: CPT | Performed by: PATHOLOGY

## 2023-02-17 PROCEDURE — 36415 COLL VENOUS BLD VENIPUNCTURE: CPT | Performed by: PATHOLOGY

## 2023-02-17 PROCEDURE — 0124A COVID-19 VACCINE BIVALENT BOOSTER 12+ (PFIZER): CPT | Performed by: INTERNAL MEDICINE

## 2023-02-17 PROCEDURE — 91312 COVID-19 VACCINE BIVALENT BOOSTER 12+ (PFIZER): CPT | Performed by: INTERNAL MEDICINE

## 2023-02-17 PROCEDURE — 80053 COMPREHEN METABOLIC PANEL: CPT | Performed by: PATHOLOGY

## 2023-02-17 PROCEDURE — 99215 OFFICE O/P EST HI 40 MIN: CPT | Mod: 25 | Performed by: INTERNAL MEDICINE

## 2023-02-17 PROCEDURE — 80061 LIPID PANEL: CPT | Performed by: PATHOLOGY

## 2023-02-17 PROCEDURE — 72070 X-RAY EXAM THORAC SPINE 2VWS: CPT | Performed by: STUDENT IN AN ORGANIZED HEALTH CARE EDUCATION/TRAINING PROGRAM

## 2023-02-17 NOTE — NURSING NOTE
Ada Welch is a 52 year old female patient that presents today in clinic for the following:    Chief Complaint   Patient presents with     Follow Up     Pt due for pap, medical marijuana card renewal      The patient's allergies and medications were reviewed as noted. A set of vitals were recorded as noted without incident: BP (!) 158/97 (BP Location: Right arm, Patient Position: Sitting, Cuff Size: Adult Regular)   Pulse 82   Wt 86.7 kg (191 lb 3.2 oz)   LMP  (LMP Unknown)   SpO2 99%   BMI 29.95 kg/m  . The patient does not have any other questions for the provider.    Krista Tom, EMT at 9:21 AM on 2/17/2023

## 2023-02-17 NOTE — PROGRESS NOTES
History of Present Illness:  Ms. Welch is a 51 year old female who presents for  Chief Complaint   Patient presents with     Follow Up     Pt due for pap, medical marijuana card renewal      Struggling with pain in base of neck/lumbar this winter.  Had cervical SUNSHINE which helped a lot.  Helped with hand paresthesias, shoulder pain.  Does botox for HA.  Taking butrans, tramadol 1-2 per day, medical cannabis, capsules, duloxetine.    She continues to follow with Pelvic Floor center, Thoracic surgery, Cardiology, Neurology.    Amitryptyline at increased dose helping for sleep.BP is elevated, weight is up this winter, she is in pain today      Detailed Medical History:  -EDS-Type 3 Hypermobility. 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 He reports having a Beighton score of 7-8 out of 9. She reports having genetic testing done, which was questionable for the MYLK Gene. She previously followed with a rheumatologist who is an EDS specialist in [place].  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 Russell, has ILR. Ivadrabine and propronolol helping.  -Cervical instability, chiari malformation:  Saw Dr. Moncada, cervical fusion not recommended, considering occipital blocks. Also traveled to [facility] to see Dr. Andrey Sofia.  -Chronic headaches: Daily tension, cervicogenic, migraine, intracranial hypotension. Has not responded well to injections. Hospitalized in 2017 with normal LP and MRI. At that time responded to DHA.  Had blood patch done 3/19 with good relief of pain. Previously failed amovig and emgality. Blood patch test repeated 9/19. Now managing with medical management, Uses fioricet,maxalt, aleve, tizanidine. Botox.  -Hiatal hernia: Recently saw Dr. Mack and had manometry testing done in Feb 2019 which revealed lower esoph pressure, hiatal hernia, possibly short esophagus, peristalsis was preserved. S/p Nissen surgery 2/20.  -Eosinophilic esophagitis: EGD 12/18 performed for food impaction  showed ring at GEJ and mucosal changes c/w EoE. Biopsies 8/18 positive for Eos.  -Lumbar pain: She underwent L4 to S1 fusion in 2013 followed by removal of instrumentation, which did not improve her symptoms. She met with Dr. Martinez in the pain clinic and was taken off high doses of Dilaudid and started on Suboxone in 2017, which dramatically improved her symptoms.  She continues to work with physical therapy twice a week as well as other complementary therapies.   -Cervical pain, no surgical targets per neurosurgery  -IBS, constipation: GES showed rapid emptying 10/17. XR video swallow showed no aspiration 10/17.  -Asthma  -Enlarged cervical lymph nodes: L level 2 node biopsied 10/18 with scant cellularity. Biopsied in past and benign with neg flow cytometry.  -Urinary urge incontinence/incomplete emptying  -Pelvic floor dysfunction, rectocele, Pel[facility] Floor Center          -Chronic Pain: Has regimen of medical cannabis, tramadol,  butran, cymbalta and tizanidine. Uses medical cannabis  since 2018 which helps. Follows with Dr. Martinez.       Routine Health Maintenence:  Depression Screening:   PHQ-2 Score:     PHQ-2 ( 1999 Pfizer) 7/14/2022 7/12/2022   Q1: Little interest or pleasure in doing things 0 0   Q2: Feeling down, depressed or hopeless 0 0   PHQ-2 Score 0 0   PHQ-2 Total Score (12-17 Years)- Positive if 3 or more points; Administer PHQ-A if positive - -   Q1: Little interest or pleasure in doing things - Not at all   Q2: Feeling down, depressed or hopeless - Not at all   PHQ-2 Score - 0        Immunizations (zoster, pneumovax, flu, Tdap, Hep A/B):   There is no immunization history on file for this patient.  Lipids:   Recent Labs   Lab Test 04/26/18  1010   CHOL 244*   HDL 48*   *   TRIG 156*       Lung Ca Screening (>30 pk age 55-79 or >20 py age 50-79 + RF): n/a low pack years  Colonoscopy (50-75 yrs): 3/17 , due 2027   - One small hyperplastic polyp in the sigmoid colon, removed with a cold snare.  Resected and retrieved. Diverticulosis in the sigmoid colon. Repeat colonoscopy in10 years for surveillance   Dexa (>65W or 70M yrs): n/a  Mammogram (40-75 yrs): 1/18 ordered, 6/22  Pap (21-65 yrs): 1/18 HPV neg, due 1/23  HIV/HCV if risk factors:  HIV neg 4/18  Tob/EtOH: screen neg  Lifestyle factors: reviewed  Advanced Directive: deferred    Review of external notes as documented above                   A detailed Review of Systems was performed, verified and is negative except as documented in the HPI.  All health questionnaires were reviewed, verified and relevant information documented above.    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     Cervicalgia      Chronic sinusitis 00/2007    Diagnosed with chronic pansinusitis 2016     Coagulation disorder (H)     factor 8     CSF leak     Chiari malformation     Depression      Tom-Danlos disease      Eosinophilic esophagitis 08/2018     Gastroesophageal reflux disease 3/1/2017    I have large hiatal hernia     Hiatal hernia 2016    Have adeline hiatel hernia     Hoarseness Unsure    It comes and goes     Hypertension      Hypothyroid      IBS (irritable bowel syndrome) with constipation     improved on Linzess     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     Irregular heart beat      Migraines 1/1/2007    Unsure of exact date     Nasal polyps 2013    Were revoved 03/2017, benign     Orthostatic hypotension      Other nervous system complications 1/2014    Autonomic nervous system disorder, neuralgia, neuropathy     Swelling, mass, or lump in head and neck 08/2016    Lymph node has been growing for last year      Thyroid disease 01/01/2008     Urinary incontinence      Urinary urgency        Past Surgical History:  Past Surgical History:   Procedure Laterality Date     AS REPAIR OF NASAL SEPTUM  2009    repair broke nose     BACK SURGERY  12/09/2013  10/01/2014    Spinal fusion L4-S1, L5 moving 5/8ths in. Remove screws/rods     BIOPSY  01/01/2008 & 3/2017    mole biopsy, lymph node biopsy     COLONOSCOPY  3/2017    Colonoscopy and GI     ESOPHAGEAL IMPEDENCE FUNCTION TEST WITH 24 HOUR PH GREATER THAN 1 HOUR N/A 9/17/2019    Procedure: IMPEDANCE PH STUDY, ESOPHAGUS, 24 HOUR;  Surgeon: Raghavendra Grande MD;  Location: [facility]     ESOPHAGOSCOPY, GASTROSCOPY, DUODENOSCOPY (EGD), COMBINED N/A 8/3/2018    Procedure: COMBINED ESOPHAGOSCOPY, GASTROSCOPY, DUODENOSCOPY (EGD), BIOPSY SINGLE OR MULTIPLE;;  Surgeon: Juan Woodson MD;  Location: [facility]     ESOPHAGOSCOPY, GASTROSCOPY, DUODENOSCOPY (EGD), COMBINED N/A 10/22/2018    Procedure: COMBINED ESOPHAGOSCOPY, GASTROSCOPY, DUODENOSCOPY (EGD), BIOPSY SINGLE OR MULTIPLE;  Surgeon: Sadia Carolina MD;  Location: [facility]     ESOPHAGOSCOPY, GASTROSCOPY, DUODENOSCOPY (EGD), COMBINED N/A 12/17/2018    Procedure: COMBINED ESOPHAGOSCOPY, GASTROSCOPY, DUODENOSCOPY (EGD);  Surgeon: Juan Woodson MD;  Location: [facility]     ESOPHAGOSCOPY, GASTROSCOPY, DUODENOSCOPY (EGD), COMBINED N/A 10/14/2022    Procedure: ESOPHAGOGASTRODUODENOSCOPY, WITH BIOPSY;  Surgeon: Jay Shaffer MD;  Location: [facility]     INJECT BLOCK MEDIAL BRANCH CERVICAL/THORACIC/LUMBAR Left 9/24/2020    Procedure: BLOCK, NERVE, FACET JOINT, MEDIAL BRANCH, DIAGNOSTIC;  Surgeon: Faiza Martinez MD;  [facility]     INJECT BLOCK MEDIAL BRANCH CERVICAL/THORACIC/LUMBAR Bilateral 10/8/2020    Procedure: Bilateral cervical 3, cervical 4, and cervical 5 medial branch nerve block;  Surgeon: Faiza Martinez MD;  Location: [facility]     INJECT BLOCK MEDIAL BRANCH CERVICAL/THORACIC/LUMBAR Right 3/4/2021    Procedure: Cervical 3, cervical 4,  cervical 5 medial branch nerve blocks to target C3-C4 and C4-C5 facet joint;  Surgeon: Faiza Martinez MD;  Location: [facility]     INJECT EPIDURAL CERVICAL N/A 10/27/2022    Procedure: INJECTION, SPINE, CERVICAL, EPIDURAL, C7-T1;  Surgeon: Faiza Martinez MD;  Location: [facility]     LAPAROSCOPIC HERNIORRHAPHY HIATAL N/A 2/10/2020    Procedure: LAPAROSCOPIC HIATAL HERNIA REPAIR, NISSEN FUNDOPLICATION, ESOPHOGASTRODUODENOSCOPY, PEG TUBE PLACEMENT.;  Surgeon: Alana Mack MD;  Location: [facility]     NASAL/SINUS POLYPECTOMY  2017    Polyps were benign     NOSE SURGERY  2007    deviated septum     TONSILLECTOMY & ADENOIDECTOMY  1981     TUBAL LIGATION  07/01/2011       Active Meds:  Current Outpatient Medications   Medication     acetaminophen (TYLENOL) 500 MG tablet     amitriptyline (ELAVIL) 50 MG tablet     buprenorphine (BUTRANS) 10 MCG/HR WK patch     butalbital-acetaminophen-caffeine (ESGIC) -40 MG tablet     cetirizine (ZYRTEC) 10 MG tablet     co-enzyme Q-10 100 MG CAPS capsule     DULoxetine (CYMBALTA) 30 MG capsule     EPINEPHrine (EPIPEN/ADRENACLICK/OR ANY BX GENERIC EQUIV) 0.3 MG/0.3ML injection 2-pack     famotidine (PEPCID) 20 MG tablet     ferrous fumarate 65 mg, [place]. FE,-Vitamin C 125 mg (VITRON-C)  MG TABS tablet     fluticasone (FLONASE) 50 MCG/ACT nasal spray     HYDROmorphone (DILAUDID) 2 MG tablet     ivabradine (CORLANOR) 7.5 MG tablet     Lidocaine (LIDOCARE) 4 % Patch     medical cannabis (Patient's own supply.  Not a prescription)     metoclopramide (REGLAN) 10 MG tablet     Multiple Vitamins-Minerals (MULTIVITAMIN PO)     naloxone (NARCAN) 4 MG/0.1ML nasal spray     naproxen sodium (ANAPROX) 220 MG tablet     pantoprazole (PROTONIX) 40 MG EC tablet     Probiotic Product (PROBIOTIC DAILY PO)     propranolol ER (INDERAL LA) 120 MG 24 hr capsule     Rhubarb (ESTROVEN COMPLETE) 4 MG TABS     rizatriptan (MAXALT-MLT) 5 MG ODT     SUMAtriptan (IMITREX STATDOSE) 6 MG/0.5ML pen injector kit      tiZANidine (ZANAFLEX) 4 MG tablet     traMADol (ULTRAM) 50 MG tablet     vitamin B complex with vitamin C (STRESS TAB) tablet     VITAMIN D, CHOLECALCIFEROL, PO     Current Facility-Administered Medications   Medication     Botulinum Toxin Type A (BOTOX) 200 units injection 155 Units        Allergies:  Baclofen, Bee venom, Chicken-derived products (egg), Compazine [prochlorperazine], Food, Gabapentin, Gluten meal, Pregabalin, Seasonal allergies, and Topiramate    Family History:  family history includes Asthma in her sister and son; Cancer in her father; Connective Tissue Disorder in her mother and sister; Diabetes in her maternal grandmother, paternal grandfather, and paternal grandmother; Heart Disease in her maternal grandmother; Hypertension in her maternal grandmother; Migraines in her father, sister, sister, and sister; Thyroid Disease in her sister.    Social History:  Social History     Tobacco Use     Smoking status: Former     Packs/day: 0.20     Years: 10.00     Pack years: 2.00     Types: Cigarettes     Start date: 1991     Quit date: 2013     Years since quittin.2     Smokeless tobacco: Never   Vaping Use     Vaping Use: Never used   Substance Use Topics     Alcohol use: Yes     Comment: once a month     Drug use: Yes     Types: Marijuana     Comment: medical marijuana       Physical Exam:  Vitals: BP (!) 158/97 (BP Location: Right arm, Patient Position: Sitting, Cuff Size: Adult Regular)   Pulse 82   Wt 86.7 kg (191 lb 3.2 oz)   LMP  (LMP Unknown)   SpO2 99%   BMI 29.95 kg/m    Constitutional: Alert, oriented, pleasant, no acute distress  Head: Normocephalic, atraumaticy  Cardiovascular: Regular rate and rhythm, no murmurs, rubs or gallops, peripheral pulses full/symmetric  Respiratory: Good air movement bilaterally, lungs clear, no wheezes/rales/rhonchi  Musculoskeletal: No edema  Neurologic: Alert and oriented, cranial nerves 2-12 intact, grossly non-focal  Psychiatric: normal  mentation, affect and mood      Diagnostics:  Labs reviewed in Epic          Assessment and Plan:  Ada was seen today for follow up.    Diagnoses and all orders for this visit:    Need for COVID-19 vaccine  -     COVID-19 VACCINE BIVALENT BOOSTER 12+ (PFIZER)    Screening for hyperlipidemia  -     Lipid panel reflex to direct LDL Fasting; Future    Dysautonomia (H)  -     Hemoglobin A1c; Future  -     TSH with free T4 reflex; Future  -     Comprehensive metabolic panel (BMP + Alb, Alk Phos, ALT, AST, Total. Bili, TP); Future    Chronic midline thoracic back pain  -     XR Thoracic Spine 2 Views; Future    Abnormal finding of blood chemistry, unspecified  -     Hemoglobin A1c; Future    Other abnormal findings on diagnostic imaging of central nervous system  -     TSH with free T4 reflex; Future    Vitamin D deficiency  -     Vitamin D Deficiency; Future    She wishes to schedule pap at another visit.  Medical cannabis recertification completed.      Ellyn Rivera MD  Internal Medicine      >40 minutes spent today performing chart review, history and exam, counseling, care coordination, documentation and further activities as noted above exclusive of any procedures or EKG interpretation

## 2023-02-21 ENCOUNTER — TELEPHONE (OUTPATIENT)
Dept: ANESTHESIOLOGY | Facility: CLINIC | Age: 53
End: 2023-02-21
Payer: COMMERCIAL

## 2023-02-21 NOTE — TELEPHONE ENCOUNTER
I called patient and left a message that her appointment with Dr Martinez will be changed to Video visit 2/23/23/ at 8:40 am

## 2023-02-23 ENCOUNTER — TELEPHONE (OUTPATIENT)
Dept: ANESTHESIOLOGY | Facility: CLINIC | Age: 53
End: 2023-02-23

## 2023-02-23 ENCOUNTER — VIRTUAL VISIT (OUTPATIENT)
Dept: ANESTHESIOLOGY | Facility: CLINIC | Age: 53
End: 2023-02-23
Payer: COMMERCIAL

## 2023-02-23 DIAGNOSIS — Z79.891 OPIOID CONTRACT EXISTS: ICD-10-CM

## 2023-02-23 DIAGNOSIS — M54.17 LUMBOSACRAL RADICULOPATHY AT L5: ICD-10-CM

## 2023-02-23 DIAGNOSIS — M47.812 CERVICAL FACET JOINT SYNDROME: ICD-10-CM

## 2023-02-23 DIAGNOSIS — M54.12 CERVICAL RADICULOPATHY: Primary | ICD-10-CM

## 2023-02-23 DIAGNOSIS — M53.3 CHRONIC SACROILIAC JOINT PAIN: ICD-10-CM

## 2023-02-23 DIAGNOSIS — M79.2 NEUROPATHIC PAIN: ICD-10-CM

## 2023-02-23 DIAGNOSIS — M96.1 POSTLAMINECTOMY SYNDROME: ICD-10-CM

## 2023-02-23 DIAGNOSIS — G89.4 CHRONIC PAIN SYNDROME: ICD-10-CM

## 2023-02-23 DIAGNOSIS — G89.29 CHRONIC SACROILIAC JOINT PAIN: ICD-10-CM

## 2023-02-23 DIAGNOSIS — M25.50 POLYARTHRALGIA: ICD-10-CM

## 2023-02-23 DIAGNOSIS — E66.09 OBESITY DUE TO EXCESS CALORIES WITHOUT SERIOUS COMORBIDITY, UNSPECIFIED CLASSIFICATION: ICD-10-CM

## 2023-02-23 PROCEDURE — 99214 OFFICE O/P EST MOD 30 MIN: CPT | Mod: VID | Performed by: ANESTHESIOLOGY

## 2023-02-23 RX ORDER — DIAZEPAM 5 MG
5-10 TABLET ORAL
Status: CANCELLED | OUTPATIENT
Start: 2023-02-23

## 2023-02-23 ASSESSMENT — ENCOUNTER SYMPTOMS
WEAKNESS: 1
ORTHOPNEA: 1
SEIZURES: 0
NECK PAIN: 1
WEIGHT LOSS: 0
BRUISES/BLEEDS EASILY: 1
TINGLING: 1
JOINT SWELLING: 1
HEARTBURN: 0
DECREASED LIBIDO: 1
EYE WATERING: 1
EYE PAIN: 0
NECK MASS: 1
SINUS PAIN: 1
FEVER: 0
TREMORS: 1
HEMOPTYSIS: 0
HYPOTENSION: 0
DISTURBANCES IN COORDINATION: 1
POOR WOUND HEALING: 1
JAUNDICE: 0
SHORTNESS OF BREATH: 1
VOMITING: 0
LIGHT-HEADEDNESS: 1
SNORES LOUDLY: 1
ALTERED TEMPERATURE REGULATION: 1
HOT FLASHES: 1
EYE IRRITATION: 1
COUGH: 0
PALPITATIONS: 1
NAIL CHANGES: 1
SINUS CONGESTION: 1
COUGH DISTURBING SLEEP: 0
FATIGUE: 1
RECTAL PAIN: 0
DECREASED APPETITE: 1
MYALGIAS: 1
BACK PAIN: 1
DIZZINESS: 1
SLEEP DISTURBANCES DUE TO BREATHING: 1
TASTE DISTURBANCE: 0
MUSCLE CRAMPS: 1
POLYDIPSIA: 1
DYSURIA: 1
NUMBNESS: 1
SYNCOPE: 0
SPUTUM PRODUCTION: 0
WEIGHT GAIN: 1
SPEECH CHANGE: 1
BLOATING: 1
LOSS OF CONSCIOUSNESS: 1
SWOLLEN GLANDS: 1
PARALYSIS: 0
HEMATURIA: 0
WHEEZING: 0
DYSPNEA ON EXERTION: 1
TROUBLE SWALLOWING: 1
SKIN CHANGES: 0
HOARSE VOICE: 1
MUSCLE WEAKNESS: 1
SMELL DISTURBANCE: 0
ABDOMINAL PAIN: 1
CHILLS: 1
HYPERTENSION: 1
HEADACHES: 1
SORE THROAT: 0
DIARRHEA: 1
CONSTIPATION: 0
BOWEL INCONTINENCE: 0
FLANK PAIN: 1
STIFFNESS: 1
ARTHRALGIAS: 1
POSTURAL DYSPNEA: 1
NAUSEA: 1
EYE REDNESS: 1
INCREASED ENERGY: 1
EXERCISE INTOLERANCE: 1
BLOOD IN STOOL: 0
NIGHT SWEATS: 1
HALLUCINATIONS: 0
LEG PAIN: 1
DIFFICULTY URINATING: 1
DOUBLE VISION: 1
MEMORY LOSS: 1
POLYPHAGIA: 0

## 2023-02-23 ASSESSMENT — PAIN SCALES - GENERAL: PAINLEVEL: EXTREME PAIN (8)

## 2023-02-23 NOTE — PROGRESS NOTES
Ada is a 52 year old who is being evaluated via a billable video visit.      How would you like to obtain your AVS? No    If the video visit is dropped, the invitation should be resent by: [phone]  Will anyone else be joining your video visit? no

## 2023-02-23 NOTE — PROGRESS NOTES
Video visit duration 25 minutes    [facility] : Progress Note    Date of visit: 2/23/2023    Interval history:    Ada Welch is a 52 year old female who  is well known to me for multifocal chronic pain. The patient has  complex history of low back pain for ~25 years for which she underwent L4-S1 fusion (TLIF) 12/9/13.  Postoperatively, her back pain worsened (mainly LBP, but also radiated down both lower limbs in an S1 dermatomal distribution).   Underwent removal of posterior instrumentation (screws and guadalupe).  After fusion removal, her pain continued.  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  She even sought care from a EDS specialist in [place].  She is currently on disability.     She has a h/o significant autonomic dysfunction.  Since 2014, she reports that her heart rate has fluctuated from as low as  beats per minutes and her blood pressure also fluctuates at the same time without any aggravating factors.  The fluctuation of the blood pressure and heart rate happens when she is supine, sitting, standing, walking, etc.  She has seen cardiologist Dr. Russell who advised her to continue fludrocortisone, isometric exercise, low carbohydrate and low gluten diet.     She saw Dr. Singh for urinary urgency and incontinence.  She is advised for improving lifestyle, dietary modification, optimizing bowel regimen and to start pelvic physical therapy.     She saw Dr. Moncada for left upper extremity radicular pain along the C8 nerve root distribution.  Per Dr. Moncada's note, she does not have a surgical target which may explain her symptoms.  She still continues to report this symptom.  We discussed about epidural steroid injection, however she is reluctant to perform the procedures and states she previously had a bad reaction with a steroid.   Patient also had cervical medial branch nerve block x2 with us 50% pain relief.   However  medial branch nerve radiofrequency ablation was not approved by the insurance.     The patient saw Dr. Mack for eosinophilic esophagitis, GERD, and dysphagia.    She had open hernia surgery by Dr. Mack in 2020.  Her postoperative course was uneventful.        The patient has been seeing  Dr. Noemy Nguyen for chronic migraines. Patient reports that her chronic intractable headache pain recently got worse. 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  She has undergone multiple blood patches intermittently for her headache pain.     Recently saw Dr. Enciso for lower back pain secondary to persistent spinal pain after L4-S1 fusion.       2013 - L4-S1 fusion, TLIF (Dr. Williamson in [place], [place])    - removal of posterior instrumentation (screws and guadalupe) with exploration of fusion (Dr. MIKE Lucero ([facility]))     She was recommended by Dr. Enciso for nonoperative treatment including epidural steroid injection,  medial branch nerve block/radiofrequency ablation of the lumbar MBB, and/or SI joint injections.  If these injections are proven to be ineffective she may need to extend her fusion up to L3-L4.     Currently she has been  managing her pain with stable dose of Butrans 10 mcg per hour q 7 days, tramadol 50 mg every 6 hours as needed which she refills every 2-3 months.  In addition she takes tizanidine 4 mg 4 times daily as needed.    She takes sumatriptan for breakthrough migraine.  Occasionally she takes oxycodone  when her pain gets significantly worse.      Today her main complaints are followin.    She is interested in cervical epidural steroid injection for neck pain radiating to the bilateral upper extremities.  Her MRI of the cervical spine performed in July this year showed moderate neuroforaminal stenosis at C5-C6 and C6-C7.  She had cervical epidural steroid injection in October last year, which provided her very good pain relief for several months.  She states all of  her tingling sensation symptoms in the extremities completely resolved after the injection.  Radiating pain to the shoulder also significantly improved.  In the past she had tried lumbar epidural steroid injections, cervical medial branch block and sacroiliac joint injections without much benefit.     2.  She is interested in injections in the lower lumbar spine.  Recommend lumbar L3-L4 epidural steroid injection followed by diagnostic lumbar MBB.   Discussed with her that due to prior surgery, lumbar epidural steroid injection may not be as effective as cervical epidural..    3.  Currently managing pain with Butrans patch 10 mics per hour.  She states that her pain is not adequately controlled with prn tramadol and oxycodone.  Previously she took hydromorphone.    Today I refilled her Butrans and tramadol.  In addition I gave her a prescription of amitriptyline 25 to 50 mg nightly.         [place] Prescription Monitoring Program:   Reviewed. No concerns     Review of Systems:  The 14 system ROS was reviewed and was negative except what is documented above and as follows.  Any bowel or bladder problems: none  Mood: okay    Physical Exam:  There were no vitals filed for this visit.  Since this is a video visit  General: Very pleasant in conversation.  In no acute distress   Lungs: unlabored.   Extremities: Able to move all extremities   musculoskeletal: States pain to palpation in the shoulder neck and lower back  Neurologic exam: Alert awake and oriented  Psychiatric; mentation intact  Skin: Warm and Dry.    Medications:  Current Outpatient Medications   Medication Sig Dispense Refill     acetaminophen (TYLENOL) 500 MG tablet Take 1,000 mg by mouth every 6 hours as needed for mild pain       amitriptyline (ELAVIL) 50 MG tablet TAKE 1 TABLET BY MOUTH AT BEDTIME 30 tablet 1     buprenorphine (BUTRANS) 10 MCG/HR WK patch PLACE 1 PATCH ONTO THE SKIN EVERY 7 DAYS. 4 patch 1     butalbital-acetaminophen-caffeine (ESGIC)  -40 MG tablet Take 1 tablet by mouth every 6 hours as needed for headaches 10 tablet 0     cetirizine (ZYRTEC) 10 MG tablet Take 10 mg by mouth daily as needed        co-enzyme Q-10 100 MG CAPS capsule Take 100 mg by mouth daily       DULoxetine (CYMBALTA) 30 MG capsule TAKE 2 CAPSULES BY MOUTH IN THE AM AND 1 CAPSULES AT BEDTIME 270 capsule 1     EPINEPHrine (EPIPEN/ADRENACLICK/OR ANY BX GENERIC EQUIV) 0.3 MG/0.3ML injection 2-pack INJECT 0.3 MLS (0.3 MG) INTO THE MUSCLE AS NEEDED FOR ANAPHYLAXIS  1     famotidine (PEPCID) 20 MG tablet Take 20 mg by mouth At Bedtime       ferrous fumarate 65 mg, [place]. FE,-Vitamin C 125 mg (VITRON-C)  MG TABS tablet Take 1 tablet by mouth every other day 60 tablet 4     fluticasone (FLONASE) 50 MCG/ACT nasal spray Spray 2 sprays into both nostrils daily Call clinic to schedule follow up appointment. 48 mL 11     HYDROmorphone (DILAUDID) 2 MG tablet Take 2 tablets (4 mg) by mouth every 6 hours as needed for pain 27 tablet 0     ivabradine (CORLANOR) 7.5 MG tablet Take 1 tablet (7.5 mg) by mouth 2 times daily 180 tablet 3     Lidocaine (LIDOCARE) 4 % Patch Place 1-3 patches onto the skin every 24 hours To prevent lidocaine toxicity, patient should be patch free for 12 hrs daily. 20 patch 0     medical cannabis (Patient's own supply.  Not a prescription) Take 1 Dose by mouth See Admin Instructions Capsule formulation - uses as needed       metoclopramide (REGLAN) 10 MG tablet Take 1 tablet (10 mg) by mouth 4 times daily as needed (headache) 40 tablet 3     Multiple Vitamins-Minerals (MULTIVITAMIN PO) Take 1 tablet by mouth daily        naloxone (NARCAN) 4 MG/0.1ML nasal spray Spray 1 spray (4 mg) into one nostril alternating nostrils once as needed for opioid reversal every 2-3 minutes until assistance arrives 0.2 mL 0     naproxen sodium (ANAPROX) 220 MG tablet Take 440-660 mg by mouth daily as needed for moderate pain       pantoprazole (PROTONIX) 40 MG EC tablet         Probiotic Product (PROBIOTIC DAILY PO) Take 2 packets by mouth every morning        propranolol ER (INDERAL LA) 120 MG 24 hr capsule Take 1 capsule (120 mg) by mouth three times a week 39 capsule 3     Rhubarb (ESTROVEN COMPLETE) 4 MG TABS Take 1 tablet by mouth       rizatriptan (MAXALT-MLT) 5 MG ODT TAKE 1-2 TABLETS (5-10 MG) BY MOUTH AT ONSET OF HEADACHE FOR MIGRAINE (MAX 30 MG IN 24 HOURS) 18 tablet 11     SUMAtriptan (IMITREX STATDOSE) 6 MG/0.5ML pen injector kit Inject 0.5 ml (6 mg) subcutaneously at onset of migraine, May repeat in 1 hour , Max 12 mg/24 hrs 2 kit 11     tiZANidine (ZANAFLEX) 4 MG tablet Take 1 tablet (4 mg) by mouth 4 times daily as needed for muscle spasms 120 tablet 1     traMADol (ULTRAM) 50 MG tablet TAKE 1 TABLET BY MOUTH EVERY 6 HOURS AS NEEDED FOR SEVERE PAIN 60 tablet 1     vitamin B complex with vitamin C (STRESS TAB) tablet Take 1 tablet by mouth daily       VITAMIN D, CHOLECALCIFEROL, PO Take 2,000 Units by mouth daily         Analgesic Medications:   Medications related to Pain Management (From now, onward)    None             LABORATORY VALUES:   Recent Labs   Lab Test 02/17/23  1037 11/13/20  1133    134   POTASSIUM 4.8 4.5   CHLORIDE 102 102   CO2 29 29   ANIONGAP 10 4   * 96   BUN 11.9 10   CR 0.80 0.83   JUAN 10.0 8.6       CBC RESULTS:   Recent Labs   Lab Test 06/24/22  1122   WBC 6.5   RBC 4.74   HGB 14.8   HCT 43.5   MCV 92   MCH 31.2   MCHC 34.0   RDW 12.1          Most Recent 3 INR's:  Recent Labs   Lab Test 01/30/20  1325 02/28/19  1045 12/17/18  0920   INR 0.93 0.97 0.90           ASSESSMENT:    Diagnoses       Codes Comments    Cervical radiculopathy    -  Primary M54.12     Obesity due to excess calories without serious comorbidity, unspecified classification     E66.09     Postlaminectomy syndrome     M96.1     Chronic sacroiliac joint pain     M53.3, G89.29     Opioid contract exists     Z79.891     Polyarthralgia     M25.50     Lumbosacral  radiculopathy at L5     M54.17     Chronic pain syndrome     G89.4     Cervical facet joint syndrome     M47.812     Neuropathic pain     M79.2           PLAN:       Continue Butrans 10 mics per hour.  4 patches per month    Tramadol 50 mg daily as needed.  60 tablets dispensed.    Recommend over-the-counter lidocaine 4% patches for the most painful spots on lower back every 12 hours    Continue using medical cannabis    Continue methocarbamol 500 mg 4 times daily as needed.  120 tablets dispensed    Sumatriptan/rizatriptan for migraine headache as needed.  Prescribe ER provider    Amitriptyline 25 to 50 mg nightly.    Ordered cervical epidural steroid injection.  Risk of the procedure including bleeding, infection, failed procedure, nerve injury, paralysis discussed with her.  All questions were answered.     Follow-up 6 months  Assessment will be ongoing with changes in treatment as indicated.  Benefits/risks/alternatives to treatment have been reviewed and the patient has been instructed to contact this office if they have any questions or concerns.  This plan of care has been discussed with the patient and the patient is in agreement.     Faiza Martinez MD, PHD

## 2023-02-23 NOTE — NURSING NOTE
Patient presents with:  Follow Up: Follow-up lower back, Neck, shoulders,pain      Extreme Pain (8)     Pain Medications     Analgesics Other Refills Start End     acetaminophen (TYLENOL) 500 MG tablet          Sig - Route: Take 1,000 mg by mouth every 6 hours as needed for mild pain - Oral    Class: Historical    Analgesic Combinations Refills Start End     butalbital-acetaminophen-caffeine (ESGIC) -40 MG tablet    0 10/28/2022     Sig - Route: Take 1 tablet by mouth every 6 hours as needed for headaches - Oral    Class: Historical    Opioid Agonists Refills Start End     HYDROmorphone (DILAUDID) 2 MG tablet    0 7/21/2022     Sig - Route: Take 2 tablets (4 mg) by mouth every 6 hours as needed for pain - Oral    Class: E-Prescribe    Earliest Fill Date: 7/21/2022    Notes to Pharmacy: The patient did not refill the prior prescription as the pharmacy was out of stock.  This is a prescription for remaining medications.     traMADol (ULTRAM) 50 MG tablet    1 2/16/2023     Sig: TAKE 1 TABLET BY MOUTH EVERY 6 HOURS AS NEEDED FOR SEVERE PAIN    Class: E-Prescribe    Notes to Pharmacy: Not to exceed 4 additional fills before 06/27/2023    No prior authorization was found for this prescription.    Found prior authorization for another prescription for the same medication: Closed - Other    Opioid Partial Agonists Refills Start End     buprenorphine (BUTRANS) 10 MCG/HR WK patch    1 12/30/2022     Sig - Route: PLACE 1 PATCH ONTO THE SKIN EVERY 7 DAYS. - Transdermal    Class: E-Prescribe    Notes to Pharmacy: Not to exceed 4 additional fills before 04/11/2023    No prior authorization was found for this prescription.    Found prior authorization for another prescription for the same medication: Approved          What medications are you using for pain? Butrans, Tramadol, Duloxetine, Medical Cannabis    (New patients only) Have you been seen by another pain clinic/ provider? *no  (Return Patients only) What refills are  you needing today? Butrans

## 2023-02-23 NOTE — LETTER
2/23/2023       RE: Ada Welch  [address] [facility] [address]     Dear Colleague,    Thank you for referring your patient, Ada Welch, to the [facility] [place] at [facility]. Please see a copy of my visit note below.    Video visit duration 25 minutes    [facility] : Progress Note    Date of visit: 2/23/2023    Interval history:    Ada Welch is a 52 year old female who  is well known to me for multifocal chronic pain. The patient has  complex history of low back pain for ~25 years for which she underwent L4-S1 fusion (TLIF) 12/9/13.  Postoperatively, her back pain worsened (mainly LBP, but also radiated down both lower limbs in an S1 dermatomal distribution).   Underwent removal of posterior instrumentation (screws and guadalupe).  After fusion removal, her pain continued.  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  She even sought care from a EDS specialist in [place].  She is currently on disability.     She has a h/o significant autonomic dysfunction.  Since 2014, she reports that her heart rate has fluctuated from as low as  beats per minutes and her blood pressure also fluctuates at the same time without any aggravating factors.  The fluctuation of the blood pressure and heart rate happens when she is supine, sitting, standing, walking, etc.  She has seen cardiologist Dr. Russell who advised her to continue fludrocortisone, isometric exercise, low carbohydrate and low gluten diet.     She saw Dr. Singh for urinary urgency and incontinence.  She is advised for improving lifestyle, dietary modification, optimizing bowel regimen and to start pelvic physical therapy.     She saw Dr. Moncada for left upper extremity radicular pain along the C8 nerve root distribution.  Per Dr. Moncada's note, she does not have a  surgical target which may explain her symptoms.  She still continues to report this symptom.  We discussed about epidural steroid injection, however she is reluctant to perform the procedures and states she previously had a bad reaction with a steroid.   Patient also had cervical medial branch nerve block x2 with us 50% pain relief.   However medial branch nerve radiofrequency ablation was not approved by the insurance.     The patient saw Dr. Mack for eosinophilic esophagitis, GERD, and dysphagia.    She had open hernia surgery by Dr. Mack in 2020.  Her postoperative course was uneventful.        The patient has been seeing  Dr. Noemy Nguyen for chronic migraines. Patient reports that her chronic intractable headache pain recently got worse. 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  She has undergone multiple blood patches intermittently for her headache pain.     Recently saw Dr. Enciso for lower back pain secondary to persistent spinal pain after L4-S1 fusion.       2013 - L4-S1 fusion, TLIF (Dr. Williamson in [place], [place])    - removal of posterior instrumentation (screws and guadalupe) with exploration of fusion (Dr. MIKE Lucero ([facility]))     She was recommended by Dr. Enciso for nonoperative treatment including epidural steroid injection,  medial branch nerve block/radiofrequency ablation of the lumbar MBB, and/or SI joint injections.  If these injections are proven to be ineffective she may need to extend her fusion up to L3-L4.     Currently she has been  managing her pain with stable dose of Butrans 10 mcg per hour q 7 days, tramadol 50 mg every 6 hours as needed which she refills every 2-3 months.  In addition she takes tizanidine 4 mg 4 times daily as needed.    She takes sumatriptan for breakthrough migraine.  Occasionally she takes oxycodone  when her pain gets significantly worse.      Today her main complaints are followin.    She is interested in cervical epidural  steroid injection for neck pain radiating to the bilateral upper extremities.  Her MRI of the cervical spine performed in July this year showed moderate neuroforaminal stenosis at C5-C6 and C6-C7.  She had cervical epidural steroid injection in October last year, which provided her very good pain relief for several months.  She states all of her tingling sensation symptoms in the extremities completely resolved after the injection.  Radiating pain to the shoulder also significantly improved.  In the past she had tried lumbar epidural steroid injections, cervical medial branch block and sacroiliac joint injections without much benefit.     2.  She is interested in injections in the lower lumbar spine.  Recommend lumbar L3-L4 epidural steroid injection followed by diagnostic lumbar MBB.   Discussed with her that due to prior surgery, lumbar epidural steroid injection may not be as effective as cervical epidural..    3.  Currently managing pain with Butrans patch 10 mics per hour.  She states that her pain is not adequately controlled with prn tramadol and oxycodone.  Previously she took hydromorphone.    Today I refilled her Butrans and tramadol.  In addition I gave her a prescription of amitriptyline 25 to 50 mg nightly.         [place] Prescription Monitoring Program:   Reviewed. No concerns     Review of Systems:  The 14 system ROS was reviewed and was negative except what is documented above and as follows.  Any bowel or bladder problems: none  Mood: okay    Physical Exam:  There were no vitals filed for this visit.  Since this is a video visit  General: Very pleasant in conversation.  In no acute distress   Lungs: unlabored.   Extremities: Able to move all extremities   musculoskeletal: States pain to palpation in the shoulder neck and lower back  Neurologic exam: Alert awake and oriented  Psychiatric; mentation intact  Skin: Warm and Dry.    Medications:  Current Outpatient Medications   Medication Sig  Dispense Refill     acetaminophen (TYLENOL) 500 MG tablet Take 1,000 mg by mouth every 6 hours as needed for mild pain       amitriptyline (ELAVIL) 50 MG tablet TAKE 1 TABLET BY MOUTH AT BEDTIME 30 tablet 1     buprenorphine (BUTRANS) 10 MCG/HR WK patch PLACE 1 PATCH ONTO THE SKIN EVERY 7 DAYS. 4 patch 1     butalbital-acetaminophen-caffeine (ESGIC) -40 MG tablet Take 1 tablet by mouth every 6 hours as needed for headaches 10 tablet 0     cetirizine (ZYRTEC) 10 MG tablet Take 10 mg by mouth daily as needed        co-enzyme Q-10 100 MG CAPS capsule Take 100 mg by mouth daily       DULoxetine (CYMBALTA) 30 MG capsule TAKE 2 CAPSULES BY MOUTH IN THE AM AND 1 CAPSULES AT BEDTIME 270 capsule 1     EPINEPHrine (EPIPEN/ADRENACLICK/OR ANY BX GENERIC EQUIV) 0.3 MG/0.3ML injection 2-pack INJECT 0.3 MLS (0.3 MG) INTO THE MUSCLE AS NEEDED FOR ANAPHYLAXIS  1     famotidine (PEPCID) 20 MG tablet Take 20 mg by mouth At Bedtime       ferrous fumarate 65 mg, [place]. FE,-Vitamin C 125 mg (VITRON-C)  MG TABS tablet Take 1 tablet by mouth every other day 60 tablet 4     fluticasone (FLONASE) 50 MCG/ACT nasal spray Spray 2 sprays into both nostrils daily Call clinic to schedule follow up appointment. 48 mL 11     HYDROmorphone (DILAUDID) 2 MG tablet Take 2 tablets (4 mg) by mouth every 6 hours as needed for pain 27 tablet 0     ivabradine (CORLANOR) 7.5 MG tablet Take 1 tablet (7.5 mg) by mouth 2 times daily 180 tablet 3     Lidocaine (LIDOCARE) 4 % Patch Place 1-3 patches onto the skin every 24 hours To prevent lidocaine toxicity, patient should be patch free for 12 hrs daily. 20 patch 0     medical cannabis (Patient's own supply.  Not a prescription) Take 1 Dose by mouth See Admin Instructions Capsule formulation - uses as needed       metoclopramide (REGLAN) 10 MG tablet Take 1 tablet (10 mg) by mouth 4 times daily as needed (headache) 40 tablet 3     Multiple Vitamins-Minerals (MULTIVITAMIN PO) Take 1 tablet by mouth daily         naloxone (NARCAN) 4 MG/0.1ML nasal spray Spray 1 spray (4 mg) into one nostril alternating nostrils once as needed for opioid reversal every 2-3 minutes until assistance arrives 0.2 mL 0     naproxen sodium (ANAPROX) 220 MG tablet Take 440-660 mg by mouth daily as needed for moderate pain       pantoprazole (PROTONIX) 40 MG EC tablet        Probiotic Product (PROBIOTIC DAILY PO) Take 2 packets by mouth every morning        propranolol ER (INDERAL LA) 120 MG 24 hr capsule Take 1 capsule (120 mg) by mouth three times a week 39 capsule 3     Rhubarb (ESTROVEN COMPLETE) 4 MG TABS Take 1 tablet by mouth       rizatriptan (MAXALT-MLT) 5 MG ODT TAKE 1-2 TABLETS (5-10 MG) BY MOUTH AT ONSET OF HEADACHE FOR MIGRAINE (MAX 30 MG IN 24 HOURS) 18 tablet 11     SUMAtriptan (IMITREX STATDOSE) 6 MG/0.5ML pen injector kit Inject 0.5 ml (6 mg) subcutaneously at onset of migraine, May repeat in 1 hour , Max 12 mg/24 hrs 2 kit 11     tiZANidine (ZANAFLEX) 4 MG tablet Take 1 tablet (4 mg) by mouth 4 times daily as needed for muscle spasms 120 tablet 1     traMADol (ULTRAM) 50 MG tablet TAKE 1 TABLET BY MOUTH EVERY 6 HOURS AS NEEDED FOR SEVERE PAIN 60 tablet 1     vitamin B complex with vitamin C (STRESS TAB) tablet Take 1 tablet by mouth daily       VITAMIN D, CHOLECALCIFEROL, PO Take 2,000 Units by mouth daily         Analgesic Medications:   Medications related to Pain Management (From now, onward)    None             LABORATORY VALUES:   Recent Labs   Lab Test 02/17/23  1037 11/13/20  1133    134   POTASSIUM 4.8 4.5   CHLORIDE 102 102   CO2 29 29   ANIONGAP 10 4   * 96   BUN 11.9 10   CR 0.80 0.83   JUAN 10.0 8.6       CBC RESULTS:   Recent Labs   Lab Test 06/24/22  1122   WBC 6.5   RBC 4.74   HGB 14.8   HCT 43.5   MCV 92   MCH 31.2   MCHC 34.0   RDW 12.1          Most Recent 3 INR's:  Recent Labs   Lab Test 01/30/20  1325 02/28/19  1045 12/17/18  0920   INR 0.93 0.97 0.90           ASSESSMENT:    Diagnoses        Codes Comments    Cervical radiculopathy    -  Primary M54.12     Obesity due to excess calories without serious comorbidity, unspecified classification     E66.09     Postlaminectomy syndrome     M96.1     Chronic sacroiliac joint pain     M53.3, G89.29     Opioid contract exists     Z79.891     Polyarthralgia     M25.50     Lumbosacral radiculopathy at L5     M54.17     Chronic pain syndrome     G89.4     Cervical facet joint syndrome     M47.812     Neuropathic pain     M79.2           PLAN:       Continue Butrans 10 mics per hour.  4 patches per month    Tramadol 50 mg daily as needed.  60 tablets dispensed.    Recommend over-the-counter lidocaine 4% patches for the most painful spots on lower back every 12 hours    Continue using medical cannabis    Continue methocarbamol 500 mg 4 times daily as needed.  120 tablets dispensed    Sumatriptan/rizatriptan for migraine headache as needed.  Prescribe ER provider    Amitriptyline 25 to 50 mg nightly.    Ordered cervical epidural steroid injection.  Risk of the procedure including bleeding, infection, failed procedure, nerve injury, paralysis discussed with her.  All questions were answered.     Follow-up 6 months  Assessment will be ongoing with changes in treatment as indicated.  Benefits/risks/alternatives to treatment have been reviewed and the patient has been instructed to contact this office if they have any questions or concerns.  This plan of care has been discussed with the patient and the patient is in agreement.     Faiza Martinez MD, PHD

## 2023-02-23 NOTE — TELEPHONE ENCOUNTER
Writer attempted to contact patient to schedule injection procedure. Left message on unidentified voicemail with callback number [phone].    Stef Villarreal on 2/23/2023 at 12:34 PM

## 2023-02-23 NOTE — PATIENT INSTRUCTIONS
Referrals:    Weight Management/P Lifestyle Program Referral    To scheduled an appointment, please call [phone].      Procedures:    Call to schedule your procedure: [phone] option #2    INJECTION, SPINE, CERVICAL, EPIDURAL    Your pre-procedure instructions are below, please call our clinic if you have any questions.        Recommended Follow up:      Follow up in 6 months or earlier if indicated.        Please call [phone] to schedule your clinic appointment if you don't already have an appointment scheduled.    Procedure Information related to COVID-19     Please call [phone] option #2 to schedule, reschedule, or cancel your procedure appointment.   Phones are answered Monday - Friday from 08:00 - 4:30pm.  Leave a voicemail with your name, birth date, and phone number if no one is available to take your call.        You no longer need to test for COVID- 19 prior to your procedure/surgery, unless your physician specifically requests that you test. If you experience COVID symptoms or have tested positive for COVID-19 within 14 days of your scheduled surgery or procedure, please update our office right away and your procedure may have to be postponed.       The procedure center staff will call you several days before the procedure to review important information that you will need to know for the day of the procedure.     Please contact the clinic if you have further questions about this information [phone].        Information related to Scheduling and Pre-Procedure Instructions:    If you must reschedule your procedure more than two times, you must follow up in clinic before rescheduling again.    Preparing for your procedure    CAUTION - FAILURE TO FOLLOW THESE PRE-PROCEDURE INSTRUCTIONS WILL RESULT IN YOUR PROCEDURE BEING RESCHEDULED.    Your Procedure: : INJECTION, SPINE, CERVICAL, EPIDURAL      Pregnancy  If you are pregnant, or think you may be pregnant, please notify our staff.  This may or may not affect the ability to perform the procedure.       You must have a  take you home after your procedure. Transportation by taxi or para-transit is okay as long as you have a responsible adult accompany you. You must provide your 's full name and contact number at time of check in.     Fasting Protocol  Please have nothing to eat or drink 1 hour prior to arrival.   Medications If you take any medications, DO NOT STOP. Take your medications as usual the day of your procedure with a sip of water AT LEAST 2 HOURS PRIOR TO ARRIVAL.    Antibiotics If you are currently taking antibiotics, you must complete the entire dose 7 days prior to your scheduled procedure. You must be clear of any signs or symptoms of infection. If you begin antibiotics, please contact our clinic for instructions.     Fever, Chills, or Rash If you experience a fever of higher than 100 degrees, chills, rash, or open wounds during the one week before your procedure, please call the clinic to see if you may proceed with your procedure.      Medication Hold List  **Patients under Cardiology/Neurology care should consult their provider prior to the pain procedure to verify pre-procedure medication instructions. The information below contains general guidelines.**    Blood Thinners If you are taking daily ASPIRIN, PLAVIX, OR OTHER BLOOD THINNERS SUCH AS COUMADIN/WARFARIN, we will need your prescribing doctor to sign a release permitting you to stop these medications. Once approved by your prescribing doctor - STOP ALL BLOOD THINNERS BASED ON THE TIME TABLE BELOW PRIOR TO YOUR PROCEDURE. If you have been instructed to stop WARFARIN(COUMADIN), you must have an INR lab drawn the day before your procedure. . Your INR must be within normal limits before we can perform your injection. MEDICATIONS CAN BE RESTARTED AFTER YOUR PROCEDURE.    14 DAY HOLD  Ticlid (ticlopidine)    10 DAY HOLD  Effient (Prasugel)    3 DAY  HOLD  Xarelto (rivaroxaban) 7 DAY HOLD  Anacin, Bufferin, Ecotrin, Excedrin, Aggrenox (Aspirin)  Brilinta (ticagrelor)  Coumadin (Warfarin)  Pradexa (Dabigatran)  Elmiron (Pentosan)  Plavix (Clopidogrel Bisulfate)  Pletal (Cilostazol)    24 HOUR HOLD  Lovenox (enoxaparin)  Agrylin (Anagrelide)        Non-steroidal Anti-inflammatories (NSAIDs) DO NOT TAKE any non-steroidal anti-inflammatory medications (NSAIDs) listed on the table below. MEDICATIONS CAN BE RESTARTED AFTER YOUR PROCEDURE. Celebrex is OK to take and does not need to be discontinued.     Medications to stop:  3 DAY HOLD  Advil, Motrin (Ibuprofen)  Arthrotec (diciofenac sodium/misoprostol)  Clinoril (Sulindac)  Indocin (Indomethacin)  Lodine (Etodolac)  Toradol (Ketorolac)  Vicoprofen (Hydrocodone and Ibuprofen)  Voltaren (Diclofenac)    14 DAY HOLD  Daypro (Oxaprozin)  Feldene (Piroxicam)   7 DAY HOLD  Aleve (Naproxen sodium)  Darvon compound (contains aspirin)  Naprosyn (Naproxen)  Norgesic Forte (contains aspirin)  Mobic (Meloxicam)  Oruvall (Ketoprofen)  Percodan (contains aspirin)  Relafen (Nabumetone)  Salsalate  Trilisate  Vitamin E (more than 400 mg per day)  Any medication containing aspirin                To speak with a nurse, schedule/reschedule/cancel a clinic appointment, or request a medication refill call: [phone]    You can also reach us by [employer]: [email or web address]

## 2023-02-24 ENCOUNTER — TELEPHONE (OUTPATIENT)
Dept: ANESTHESIOLOGY | Facility: CLINIC | Age: 53
End: 2023-02-24
Payer: COMMERCIAL

## 2023-02-27 ENCOUNTER — TELEPHONE (OUTPATIENT)
Dept: NEUROLOGY | Facility: CLINIC | Age: 53
End: 2023-02-27
Payer: COMMERCIAL

## 2023-02-27 NOTE — TELEPHONE ENCOUNTER
Called and left voicemail for patient to call back to schedule her 6 month follow up with Dr. Martinez.

## 2023-02-28 ENCOUNTER — TELEPHONE (OUTPATIENT)
Dept: ANESTHESIOLOGY | Facility: CLINIC | Age: 53
End: 2023-02-28
Payer: COMMERCIAL

## 2023-02-28 NOTE — TELEPHONE ENCOUNTER
Called and spoke with patient. Patient said that she needs to see Dr. Martinez every 3 months due to the medication she is on. Scheduled patient for 3 month follow up.

## 2023-03-02 ENCOUNTER — TELEPHONE (OUTPATIENT)
Dept: ANESTHESIOLOGY | Facility: CLINIC | Age: 53
End: 2023-03-02
Payer: COMMERCIAL

## 2023-03-06 LAB — HBA1C MFR BLD: NORMAL %

## 2023-03-09 RX ORDER — ATORVASTATIN CALCIUM 20 MG/1
20 TABLET, FILM COATED ORAL DAILY
Qty: 90 TABLET | Refills: 3 | Status: SHIPPED | OUTPATIENT
Start: 2023-03-09 | End: 2024-03-20

## 2023-03-09 NOTE — CONFIDENTIAL NOTE
I will send statin.  As far as weight loss medications, I don't think she meets the criteria based on current weight.  Happy to revisit if weight continues to climb.   I recommend she try to modify diet first to see if she can achieve weight loss that way.  ML

## 2023-03-11 DIAGNOSIS — M96.1 POST LAMINECTOMY SYNDROME: ICD-10-CM

## 2023-03-14 RX ORDER — AMITRIPTYLINE HYDROCHLORIDE 50 MG/1
TABLET ORAL
Qty: 30 TABLET | Refills: 3 | Status: SHIPPED | OUTPATIENT
Start: 2023-03-14 | End: 2023-06-12

## 2023-03-15 ENCOUNTER — TELEPHONE (OUTPATIENT)
Dept: NEUROLOGY | Facility: CLINIC | Age: 53
End: 2023-03-15
Payer: COMMERCIAL

## 2023-03-15 NOTE — TELEPHONE ENCOUNTER
Pt has upcoming botox appt. Authorization is pending.  Please update. Thank you    Sent to [employer] team

## 2023-03-16 NOTE — TELEPHONE ENCOUNTER
oMrgan Ragland     The PA was approved for Dr. Nguyen to buy & bill for 4 visits - 155 units every 12 weeks from 03.16.23. - 03.16.24., so this patient is good to go.     Thank you,     Lucia.

## 2023-03-28 ENCOUNTER — OFFICE VISIT (OUTPATIENT)
Dept: NEUROLOGY | Facility: CLINIC | Age: 53
End: 2023-03-28
Payer: COMMERCIAL

## 2023-03-28 DIAGNOSIS — G43.719 INTRACTABLE CHRONIC MIGRAINE WITHOUT AURA AND WITHOUT STATUS MIGRAINOSUS: Primary | ICD-10-CM

## 2023-03-28 PROCEDURE — 64615 CHEMODENERV MUSC MIGRAINE: CPT | Performed by: PSYCHIATRY & NEUROLOGY

## 2023-03-28 ASSESSMENT — HEADACHE IMPACT TEST (HIT 6)
HOW OFTEN HAVE YOU FELT TOO TIRED TO WORK BECAUSE OF YOUR HEADACHES: VERY OFTEN
HIT6 TOTAL SCORE: 67
HOW OFTEN DID HEADACHS LIMIT CONCENTRATION ON WORK OR DAILY ACTIVITY: VERY OFTEN
HOW OFTEN DO HEADACHES LIMIT YOUR DAILY ACTIVITIES: VERY OFTEN
WHEN YOU HAVE A HEADACHE HOW OFTEN DO YOU WISH YOU COULD LIE DOWN: ALWAYS
HOW OFTEN HAVE YOU FELT FED UP OR IRRITATED BECAUSE OF YOUR HEADACHES: SOMETIMES
WHEN YOU HAVE HEADACHES HOW OFTEN IS THE PAIN SEVERE: VERY OFTEN

## 2023-03-28 NOTE — PROGRESS NOTES
BRYCE Caballero KINA Rebekah MRN# [id number]   Age: 51 year old YOB: 1970      Botulinum Toxin Procedure Note     The procedure was explained to the patient. Benefits of the treatment were discussed including headache and migraine reduction. Risks of the procedure were reviewed including but not limited to pain, bruising, bleeding, infection, weakness of muscles injected or those distal to injection. The patient voiced understanding of the risks and benefits. All questions answered and the patient consented to proceed.      Prior to receiving Botox injections the patient reported the following:  Baseline headache frequency: > 15 days/month  Baseline headache duration: > 4 hours  Associated migrainous features: wavy vision     Previous treatment trials: amitriptyline, zonisamide, gabapentin, Aimovig, Emgality, duloxetine, tizanidine     Last Botox procedure: 8/23/2022  Current migraine frequency: Helped a lot, trapezius muscles felt more relaxed and she had less pulling pain, lasted  Until December. She reports some new headaches. She feels some vision changes consistent with aura that is present all the time.   Current migraine duration: constant aura.      A 200 unit vial of Botox was diluted with 4ml of 0.9% sodium chloride     Botox lot # and expiration date: See MAR for details  0.9% sodium chloride lot # and expiration date: See MAR for details     The following muscles were injected using a 30 gauge needle:  Procerus 1 site 5 units   2 sites (bilat) 10 units  Frontalis 4 sites (bilat) 20 units  Temporalis 8 sites (bilat) 40 units  Trapezius muscles 6 sites (bilat) 30 units  Occipitalis 6 sites (bilat) 40 units  Cervical paraspinals not injected due to hypermobility  Masseter 2 sites (bilat) 10 units     A total of 155 units were injected, 45 units wasted.   The patient tolerated the injections well. I was present for and performed the entire procedure.      Noemy Nguyen,  MD

## 2023-03-28 NOTE — LETTER
3/28/2023       RE: Ada Welch  [address] [facility] [address]     Dear Colleague,    Thank you for referring your patient, Ada Welch, to the [facility] [place] at [facility]. Please see a copy of my visit note below.          Physicians     Ada Welch MRN# [id number]   Age: 51 year old YOB: 1970      Botulinum Toxin Procedure Note     The procedure was explained to the patient. Benefits of the treatment were discussed including headache and migraine reduction. Risks of the procedure were reviewed including but not limited to pain, bruising, bleeding, infection, weakness of muscles injected or those distal to injection. The patient voiced understanding of the risks and benefits. All questions answered and the patient consented to proceed.      Prior to receiving Botox injections the patient reported the following:  Baseline headache frequency: > 15 days/month  Baseline headache duration: > 4 hours  Associated migrainous features: wavy vision     Previous treatment trials: amitriptyline, zonisamide, gabapentin, Aimovig, Emgality, duloxetine, tizanidine     Last Botox procedure: 8/23/2022  Current migraine frequency: Helped a lot, trapezius muscles felt more relaxed and she had less pulling pain, lasted  Until December. She reports some new headaches. She feels some vision changes consistent with aura that is present all the time.   Current migraine duration: constant aura.      A 200 unit vial of Botox was diluted with 4ml of 0.9% sodium chloride     Botox lot # and expiration date: See MAR for details  0.9% sodium chloride lot # and expiration date: See MAR for details     The following muscles were injected using a 30 gauge needle:  Procerus 1 site 5 units   2 sites (bilat) 10 units  Frontalis 4 sites (bilat) 20 units  Temporalis 8 sites (bilat) 40 units  Trapezius muscles 6 sites (bilat)  30 units  Occipitalis 6 sites (bilat) 40 units  Cervical paraspinals not injected due to hypermobility  Masseter 2 sites (bilat) 10 units     A total of 155 units were injected, 45 units wasted.   The patient tolerated the injections well. I was present for and performed the entire procedure.      Noemy Nguyen MD

## 2023-03-29 PROBLEM — M54.12 CERVICAL RADICULOPATHY: Status: ACTIVE | Noted: 2022-10-14

## 2023-03-29 NOTE — TELEPHONE ENCOUNTER
Patient is scheduled with Dr. Martinez   Spoke with: the patient   Date of Procedure: 03/30   Location: [facility]   Informed patient they will need an adult : yes   Additional comments: n/a    Patient is aware pre-op RN will call 2-3 days prior to procedure with arrival time and instructions     Stef Villarreal on 3/29/2023 at 3:13 PM

## 2023-03-30 ENCOUNTER — ANCILLARY PROCEDURE (OUTPATIENT)
Dept: RADIOLOGY | Facility: AMBULATORY SURGERY CENTER | Age: 53
End: 2023-03-30
Attending: ANESTHESIOLOGY
Payer: COMMERCIAL

## 2023-03-30 ENCOUNTER — HOSPITAL ENCOUNTER (OUTPATIENT)
Facility: AMBULATORY SURGERY CENTER | Age: 53
Discharge: HOME | End: 2023-03-30
Attending: ANESTHESIOLOGY | Admitting: ANESTHESIOLOGY
Payer: COMMERCIAL

## 2023-03-30 VITALS
HEART RATE: 78 BPM | TEMPERATURE: 97.2 F | DIASTOLIC BLOOD PRESSURE: 77 MMHG | BODY MASS INDEX: 29.98 KG/M2 | SYSTOLIC BLOOD PRESSURE: 132 MMHG | OXYGEN SATURATION: 94 % | WEIGHT: 191 LBS | RESPIRATION RATE: 19 BRPM | HEIGHT: 67 IN

## 2023-03-30 DIAGNOSIS — M54.12 CERVICAL RADICULOPATHY: ICD-10-CM

## 2023-03-30 DIAGNOSIS — M96.1 POSTLAMINECTOMY SYNDROME: ICD-10-CM

## 2023-03-30 PROCEDURE — 62321 NJX INTERLAMINAR CRV/THRC: CPT | Performed by: ANESTHESIOLOGY

## 2023-03-30 PROCEDURE — 62321 NJX INTERLAMINAR CRV/THRC: CPT

## 2023-03-30 RX ORDER — IOPAMIDOL 408 MG/ML
INJECTION, SOLUTION INTRATHECAL PRN
Status: DISCONTINUED | OUTPATIENT
Start: 2023-03-30 | End: 2023-03-30 | Stop reason: HOSPADM

## 2023-03-30 RX ORDER — DIAZEPAM 5 MG
5-10 TABLET ORAL
Status: DISCONTINUED | OUTPATIENT
Start: 2023-03-30 | End: 2023-03-31 | Stop reason: HOSPADM

## 2023-03-30 RX ORDER — LIDOCAINE HYDROCHLORIDE 10 MG/ML
INJECTION, SOLUTION EPIDURAL; INFILTRATION; INTRACAUDAL; PERINEURAL PRN
Status: DISCONTINUED | OUTPATIENT
Start: 2023-03-30 | End: 2023-03-30 | Stop reason: HOSPADM

## 2023-03-30 RX ORDER — DEXAMETHASONE SODIUM PHOSPHATE 10 MG/ML
INJECTION INTRAMUSCULAR; INTRAVENOUS PRN
Status: DISCONTINUED | OUTPATIENT
Start: 2023-03-30 | End: 2023-03-30 | Stop reason: HOSPADM

## 2023-03-30 RX ORDER — BUPRENORPHINE 10 UG/H
1 PATCH TRANSDERMAL
Qty: 4 PATCH | Refills: 1 | Status: SHIPPED | OUTPATIENT
Start: 2023-03-30 | End: 2023-06-16

## 2023-03-30 RX ORDER — TRAMADOL HYDROCHLORIDE 50 MG/1
50 TABLET ORAL EVERY 6 HOURS PRN
Qty: 60 TABLET | Refills: 1 | Status: SHIPPED | OUTPATIENT
Start: 2023-03-30 | End: 2023-05-12

## 2023-03-30 RX ORDER — BUPIVACAINE HYDROCHLORIDE 2.5 MG/ML
INJECTION, SOLUTION EPIDURAL; INFILTRATION; INTRACAUDAL PRN
Status: DISCONTINUED | OUTPATIENT
Start: 2023-03-30 | End: 2023-03-30 | Stop reason: HOSPADM

## 2023-03-30 NOTE — OP NOTE
Patient: Ada Welch Age: 51 year old   MRN: [id number] Attending: Dr. Martinez            PAIN MEDICINE CLINIC PROCEDURE NOTE     ATTENDING CLINICIAN:    Faiza Martinez MD       PREPROCEDURE DIAGNOSES:  1.  Cervical radiculopathy  2.  Chronic neck pain radiating to the upper extremity        PROCEDURE(S) PERFORMED:  1.  Interlaminar cervical epidural steroid injection  2.  Fluoroscopic guidance for the above-named procedure(s)        ANESTHESIA:  Local.     BLOOD LOSS:  Minimal.     DRAINS AND SPECIMENS:  None.     COMPLICATIONS:  None.     INDICATIONS:  Ada Welch is a 52 year old female with a history of  chronic neck pain radiating to the upper extremity.  Her MRI of the cervical spine performed in 7/9/2022 shows moderate right and mild left neuroforaminal stenosis at C5-C6 and C6-C7.  Good pain relief with the cervical epidural steroid injection in the past.  Her last injection was on 10/7/2022.  As her pain is slowly coming back she wanted to repeat injection.    The patient stated that the patient was in their usual state of health and denied recent anticoagulant use or recent infections.  Therefore, the plan is for the above mentioned procedure.      Procedure Details:  The patient was met in the procedure room, where the patient was identified by name, medical record number and date of birth.  All of the patient s last minute questions were answered. Written informed consent was obtained and saved in the electronic medical record, after the risks, benefits, and alternatives were discussed with the patient.       A formal time-out procedure was performed, as per protocol, including patient name, title of procedure, and site of procedure, and all in the room concurred.  Routine monitors were applied.       The patient was placed in the prone position on the procedure room table.  All pressure points were checked and comfortably padded.  Routine monitors were placed.  Vital signs were stable.     A  chlorhexidine prep was completed followed by sterile draping per standard procedure.      The AP fluoroscopic view was optimized for approach at  C7-T1 interspace.  The skin over the interspace was infiltrated with 4-5 mL of 1% Lidocaine using a 25 gauge, 1.5 inch needle.  A 22-gauge 3-1/2 inch Tuohy needle was advanced midline between C7-T1 interlaminar space using the loss of resistance technique with preservative free normal saline with fluoroscopic guidance. Mutiple AP, contralateral oblique, and lateral fluoroscopic images are taken as Tuohy needle was advanced to the epidural space. The epidural space was identified, without evidence of blood, cerebrospinal fluid, or parasthesia throughout. Needle tip placement within the epidural space was further confirmed with 1-2 mL of nonionic contrast agent, with the epidural space visualized in the AP, contralateral oblique, and lateral fluoroscopic view(s) with appropriate spread of the agent with fat vacuolization and no intravascular or intrathecal spread noted.  Next, 6 mL of a treatment solution containing 1 mL of preservative dexamethasone 10mg/ml, 1 mL 0.25% and 4 mL preservative free normal saline was administered. The needle was withdrawn.     Light pressure was held at the puncture site(s) to prevent ecchymosis and oozing.  The patient's skin was cleansed, and hemostasis was confirmed.  Band-aids were applied to the needle injection site(s).       Condition:     The patient remained awake and alert throughout the procedure.  The patient tolerated the procedure well and was monitored for approximately 15 minutes afterward in the post procedure area.  There were no immediate post procedure complications noted.  The patient was then discharged to home as per protocol.

## 2023-03-30 NOTE — DISCHARGE INSTRUCTIONS
"PAIN INJECTION HOME CARE INSTRUCTIONS  Activity  -You may resume most normal activity levels with the exception of strenuous activity. It may help to move in ways that hurt before the injection, to see if the pain is still there, but do not overdo it.     -DO NOT remove bandaid for 24 hours  -DO NOT shower for 24 hours      Pain  -You may feel immediate pain relief and numbness for a period of time after the injection. This may indicate the medication has reached the right spot.  -Your pain may return after this short pain-free period, or may even be a little worse for a day or two. It may be caused by needle irritation or by the medication itself. The medications usually take two or three days to start working, but can take as long as a week.    -You may use an ice pack for 20 minutes every 2 hours for the first 24 hours  -You may use a heating pad after the first 24 hours  -You may use Tylenol (acetaminophen) every 4 hours or other pain medicines as directed by your physician    DID YOU RECEIVE STEROIDS TODAY?  {YES / NO:995590::\"Yes\"}    Common side effects of steroids:  Not everyone will experience corticosteroid side effects. If side effects are experienced, they will gradually subside in the 7-10 day period following an injection. Most common side effects include:  -Flushed face and/or chest  -Feeling of warmth, particularly in the face but could be an overall feeling of warmth  -Increased blood sugar in diabetic patients  -Menstrual irregularities my occur. If taking hormone-based birth control an alternate method of birth control is recommended  -Sleep disturbances and/or mood swings are possible  -Leg cramps    PLEASE KEEP TRACK OF YOUR SYMPTOMS AND NOTE ANY CHANGES FOR YOUR DOCTOR.       Please contact us if you have:  -Severe pain  -Fever more than 101.5 degrees Fahrenheit  -Signs of infection at the injection site (redness, swelling, or drainage)      FOR PAIN CENTER PATIENTS:  If you have questions, " please contact the Pain Clinic at [phone] Option 1 between the hours of 7:00 am and 3:00 pm Monday through Friday. After office hours you can contact the on call provider by dialing [phone]. If you need immediate attention, we recommend that you go to a hospital emergency room or dial [phone]. If you need to Fax information, the number is [phone].

## 2023-04-04 ENCOUNTER — OFFICE VISIT (OUTPATIENT)
Dept: CARDIOLOGY | Facility: CLINIC | Age: 53
End: 2023-04-04
Payer: COMMERCIAL

## 2023-04-04 VITALS
BODY MASS INDEX: 29.91 KG/M2 | DIASTOLIC BLOOD PRESSURE: 101 MMHG | WEIGHT: 191 LBS | SYSTOLIC BLOOD PRESSURE: 146 MMHG | OXYGEN SATURATION: 99 % | HEART RATE: 90 BPM

## 2023-04-04 DIAGNOSIS — R00.0 SINUS TACHYCARDIA: ICD-10-CM

## 2023-04-04 DIAGNOSIS — G90.A POSTURAL ORTHOSTATIC TACHYCARDIA SYNDROME: ICD-10-CM

## 2023-04-04 DIAGNOSIS — G90.9 AUTONOMIC DYSFUNCTION: ICD-10-CM

## 2023-04-04 PROCEDURE — 99215 OFFICE O/P EST HI 40 MIN: CPT

## 2023-04-04 PROCEDURE — G0463 HOSPITAL OUTPT CLINIC VISIT: HCPCS

## 2023-04-04 PROCEDURE — 99417 PROLNG OP E/M EACH 15 MIN: CPT

## 2023-04-04 RX ORDER — PROPRANOLOL HYDROCHLORIDE 120 MG/1
120 CAPSULE, EXTENDED RELEASE ORAL
Qty: 39 CAPSULE | Refills: 3 | Status: SHIPPED | OUTPATIENT
Start: 2023-04-05 | End: 2024-02-17

## 2023-04-04 ASSESSMENT — PAIN SCALES - GENERAL: PAINLEVEL: NO PAIN (0)

## 2023-04-04 NOTE — LETTER
4/4/2023      RE: Ada Welch  [address] [address]       Dear Colleague,    Thank you for the opportunity to participate in the care of your patient, Ada Welch, at the [facility]. Please see a copy of my visit note below.        ELECTROPHYSIOLOGY CLINIC VISIT    Assessment/Recommendations   Assessment/Plan:    Ada Welch is a 52 year old female with past medical history significant for chronic migraines, complex low back pain s/p L4-S1 fusion 12/9/13 hiatal hernia s/p repair, HTN, arthritis, Tom-Danlos and dysautonomia.       Autonomic Dysfunction   Recent biotel completed shows reasonable heart rate control during daytime hours (60-80 bpm). Symptom most bothersome at this time is temperature dysregulation, however, she feels her symptoms have been pretty well controlled on her current regimen. She will continue on Propanolol 120 three times a week and Corlanor 7.5 twice daily. Refills provided. Will update echo given increased risk of aortic disease with tom danlos.     Elevated Blood Pressure  BP today 146/101, reports she has been in pain. Review of recent office visits shows it has been somewhat elevated recently. States she will monitor more regularly.     She wishes to keep her apt scheduled with Dr Russell in July, follow up at that time.      History of Present Illness/Subjective    Ms. Ada Welch is a 52 year old female who comes in today for EP follow up of dysautonomia.    Patient was initially seen by Dr Russell in 2019, at this time she had been having some heart rate fluctuations up to ~160 bpm. Also reported periods of low heart rates (40 bpm) in which she reported symptoms of feeling hot and sweaty during bradycardia and tachycardia. Given concern for possible SVT, bradycardia during the day time or possible frequent ectopy, a monitor was completed at this time which  showed sinus rhythm and sinus tachycardia. She was also having symptoms consistent with vasovagal episodes with no specific trigger. She reported chest pain at this time as well so a lexiscan was completed without abnormality. Echo previously ordered given her increased risk for aortic disease with Elhers-Danlos diagnosis. Symptoms have been maintained on Corlanor 7.5 mg BID and propanolol.     At her last visit with Dr Russell, she reported she was doing well but having some problems with autonomic fluctuations with high heart rates recorded on her iPhone a repeat biotel monitor was ordered to further evaluate this. Evaluation of repeat monitor shows symptoms associated with sinus rhythm and sinus tachycardia, no other atrial arrhythmias documented.      She presents today for follow up and reports feeling well. She continues to have symptoms of autonomic dysregulation, the most bothersome symptom being temperature fluctuations. We discussed previous biotel monitor results, reassured her there were no new findings. Overall, she feels her symptoms have improved drastically in comparison to what they once were. She continues to have significant chronic pain, she feels it has been at her baseline, ~7/10. She has been following with orthopedics and pain management. States pain management clinic has helped significantly with better control of her symptoms. Current cardiac medications include: Corlanor, Propanolol and Lipitor.       I have reviewed and updated the patient's Past Medical History, Social History, Family History and Medication List.     Cardiographics (Personally Reviewed) :   Echo: 6/7/2018  Interpretation Summary  Left ventricular function, chamber size, wall motion, and wall thickness are  normal.The EF is 60-65%.  Normal right ventricular size and systolic function.  No significant valve disease.      NM Stress Test: 9/27/2019  Impression:   1. Normal myocardial SPECT study with a summed stress score of  0.   2. No evidence of ischemia or scar.   3. Normal LV wall motion and ejection fraction.        Physical Examination   LMP  (LMP Unknown)   Wt Readings from Last 3 Encounters:   03/30/23 86.6 kg (191 lb)   02/17/23 86.7 kg (191 lb 3.2 oz)   10/27/22 77.1 kg (170 lb)   BP (!) 146/101 (BP Location: Right arm, Patient Position: Chair, Cuff Size: Adult Regular)   Pulse 90   Wt 86.6 kg (191 lb)   LMP  (LMP Unknown)   SpO2 99%   BMI 29.91 kg/m      General Appearance:   Alert, well-appearing and in no acute distress.   HEENT: Atraumatic, normocephalic.  MMM.   Chest/Lungs:   Respirations unlabored.  Lungs are clear to auscultation.   Cardiovascular:   Regular rate and rhythm.  S1/S2. No murmur.    Abdomen:  Soft, nontender, nondistended.   Extremities: No cyanosis or clubbing. No edema.    Musculoskeletal: Moves all extremities.  Gait appropriate.   Skin: Warm, dry, intact.    Neurologic: Mood and affect are appropriate.  Alert and oriented to person, place, time, and situation.          Medications  Allergies   Corlanor 7.5 twice daily   Propanolol 3 times/week  Lipitor 20 mg daily       Amitriptyline   Buprenorphine  Zyrtec  Cymbalta  Flonase  Dilaudid  Reglan  Medical Cannabis  Rizatriptan  Sumatriptan  Tramadol   Tizanidine    Allergies   Allergen Reactions    Baclofen Other (See Comments)     Loss of muscle tone. Muscle weakness.    Bee Venom Shortness Of Breath, Palpitations, Anaphylaxis and Difficulty breathing     Patient does carry Epi-Pen    Chicken-Derived Products (Egg) Nausea and Diarrhea    Compazine [Prochlorperazine]      Extreme jittery    Food      Milk    Gabapentin      Dizzy    Gluten Meal GI Disturbance     Wheat bran and wheat    Pregabalin     Seasonal Allergies      Dust, mold    Topiramate      Pt does not remember reaction         Lab Results (Personally Reviewed)    Chemistry/lipid CBC Cardiac Enzymes/BNP/TSH/INR   Lab Results   Component Value Date    BUN 11.9 02/17/2023      02/17/2023    CO2 29 02/17/2023     Creatinine   Date Value Ref Range Status   02/17/2023 0.80 0.51 - 0.95 mg/dL Final   11/13/2020 0.83 0.52 - 1.04 mg/dL Final       Lab Results   Component Value Date    CHOL 323 (H) 02/17/2023    HDL 67 02/17/2023     (H) 02/17/2023      Lab Results   Component Value Date    WBC 6.5 06/24/2022    HGB 14.8 06/24/2022    HCT 43.5 06/24/2022    MCV 92 06/24/2022     06/24/2022    Lab Results   Component Value Date    TSH 2.53 02/17/2023    INR 0.93 01/30/2020        The patient states understanding and is agreeable with the plan.     Milena Lund PA-C  [facility]  Electrophysiology Consult Service  Pager: [phone]    I spent a total of 50 minutes face to face with  Ada Welch during today's office visit. I have spent an additional 30 minutes today on chart review and documentation.

## 2023-04-04 NOTE — PATIENT INSTRUCTIONS
You were seen in the Electrophysiology Clinic today by: Milena PALACIOS    Plan:   Medication Changes:     Labs/Tests Needed:    Follow up Visit:    Further Instructions:      If you have further questions, please utilize [facility] to contact us.     Your Care Team:    EP Cardiology   Telephone Number     Nurse Line  Cheyenne Coyne, RN   Fabiana Rodriguez, RN   [phone]     For scheduling appointments:   Alexandre   [phone]   For procedure scheduling:    Arlyn Amezcua     [phone]   For the Device Clinic (Pacemakers, ICDs, Loop Recorders)    During business hours: [phone]  After business hours:   [phone]- select option 4 and ask for job code [id number].       On-call cardiologist for after hours or on weekends:   [phone], option #4, and ask to speak to the on-call cardiologist.     Cardiovascular Clinic:   [address] [place] [address]. [place] [address]      As always, Thank you for trusting us with your health care needs!

## 2023-04-04 NOTE — PROGRESS NOTES
ELECTROPHYSIOLOGY CLINIC VISIT    Assessment/Recommendations   Assessment/Plan:    Ada Welch is a 52 year old female with past medical history significant for chronic migraines, complex low back pain s/p L4-S1 fusion 12/9/13 hiatal hernia s/p repair, HTN, arthritis, Tom-Danlos and dysautonomia.       Autonomic Dysfunction   Recent biotel completed shows reasonable heart rate control during daytime hours (60-80 bpm). Symptom most bothersome at this time is temperature dysregulation, however, she feels her symptoms have been pretty well controlled on her current regimen. She will continue on Propanolol 120 three times a week and Corlanor 7.5 twice daily. Refills provided. Will update echo given increased risk of aortic disease with tom danlos.     Elevated Blood Pressure  BP today 146/101, reports she has been in pain. Review of recent office visits shows it has been somewhat elevated recently. States she will monitor more regularly.     She wishes to keep her apt scheduled with Dr Russell in July, follow up at that time.      History of Present Illness/Subjective    MsWillard Welch is a 52 year old female who comes in today for EP follow up of dysautonomia.    Patient was initially seen by Dr Russell in 2019, at this time she had been having some heart rate fluctuations up to ~160 bpm. Also reported periods of low heart rates (40 bpm) in which she reported symptoms of feeling hot and sweaty during bradycardia and tachycardia. Given concern for possible SVT, bradycardia during the day time or possible frequent ectopy, a monitor was completed at this time which showed sinus rhythm and sinus tachycardia. She was also having symptoms consistent with vasovagal episodes with no specific trigger. She reported chest pain at this time as well so a lexiscan was completed without abnormality. Echo previously ordered given her increased risk for aortic disease with Elhers-Danlos diagnosis. Symptoms have  been maintained on Corlanor 7.5 mg BID and propanolol.     At her last visit with Dr Russell, she reported she was doing well but having some problems with autonomic fluctuations with high heart rates recorded on her iPhone a repeat biotel monitor was ordered to further evaluate this. Evaluation of repeat monitor shows symptoms associated with sinus rhythm and sinus tachycardia, no other atrial arrhythmias documented.      She presents today for follow up and reports feeling well. She continues to have symptoms of autonomic dysregulation, the most bothersome symptom being temperature fluctuations. We discussed previous biotel monitor results, reassured her there were no new findings. Overall, she feels her symptoms have improved drastically in comparison to what they once were. She continues to have significant chronic pain, she feels it has been at her baseline, ~7/10. She has been following with orthopedics and pain management. States pain management clinic has helped significantly with better control of her symptoms. Current cardiac medications include: Corlanor, Propanolol and Lipitor.       I have reviewed and updated the patient's Past Medical History, Social History, Family History and Medication List.     Cardiographics (Personally Reviewed) :   Echo: 6/7/2018  Interpretation Summary  Left ventricular function, chamber size, wall motion, and wall thickness are  normal.The EF is 60-65%.  Normal right ventricular size and systolic function.  No significant valve disease.      NM Stress Test: 9/27/2019  Impression:   1. Normal myocardial SPECT study with a summed stress score of 0.   2. No evidence of ischemia or scar.   3. Normal LV wall motion and ejection fraction.        Physical Examination   LMP  (LMP Unknown)   Wt Readings from Last 3 Encounters:   03/30/23 86.6 kg (191 lb)   02/17/23 86.7 kg (191 lb 3.2 oz)   10/27/22 77.1 kg (170 lb)   BP (!) 146/101 (BP Location: Right arm, Patient Position: Chair,  Cuff Size: Adult Regular)   Pulse 90   Wt 86.6 kg (191 lb)   LMP  (LMP Unknown)   SpO2 99%   BMI 29.91 kg/m      General Appearance:   Alert, well-appearing and in no acute distress.   HEENT: Atraumatic, normocephalic.  MMM.   Chest/Lungs:   Respirations unlabored.  Lungs are clear to auscultation.   Cardiovascular:   Regular rate and rhythm.  S1/S2. No murmur.    Abdomen:  Soft, nontender, nondistended.   Extremities: No cyanosis or clubbing. No edema.    Musculoskeletal: Moves all extremities.  Gait appropriate.   Skin: Warm, dry, intact.    Neurologic: Mood and affect are appropriate.  Alert and oriented to person, place, time, and situation.          Medications  Allergies   Corlanor 7.5 twice daily   Propanolol 3 times/week  Lipitor 20 mg daily       Amitriptyline   Buprenorphine  Zyrtec  Cymbalta  Flonase  Dilaudid  Reglan  Medical Cannabis  Rizatriptan  Sumatriptan  Tramadol   Tizanidine    Allergies   Allergen Reactions     Baclofen Other (See Comments)     Loss of muscle tone. Muscle weakness.     Bee Venom Shortness Of Breath, Palpitations, Anaphylaxis and Difficulty breathing     Patient does carry Epi-Pen     Chicken-Derived Products (Egg) Nausea and Diarrhea     Compazine [Prochlorperazine]      Extreme jittery     Food      Milk     Gabapentin      Dizzy     Gluten Meal GI Disturbance     Wheat bran and wheat     Pregabalin      Seasonal Allergies      Dust, mold     Topiramate      Pt does not remember reaction         Lab Results (Personally Reviewed)    Chemistry/lipid CBC Cardiac Enzymes/BNP/TSH/INR   Lab Results   Component Value Date    BUN 11.9 02/17/2023     02/17/2023    CO2 29 02/17/2023     Creatinine   Date Value Ref Range Status   02/17/2023 0.80 0.51 - 0.95 mg/dL Final   11/13/2020 0.83 0.52 - 1.04 mg/dL Final       Lab Results   Component Value Date    CHOL 323 (H) 02/17/2023    HDL 67 02/17/2023     (H) 02/17/2023      Lab Results   Component Value Date    WBC 6.5  06/24/2022    HGB 14.8 06/24/2022    HCT 43.5 06/24/2022    MCV 92 06/24/2022     06/24/2022    Lab Results   Component Value Date    TSH 2.53 02/17/2023    INR 0.93 01/30/2020        The patient states understanding and is agreeable with the plan.     Milena Lund PA-C  [facility]  Electrophysiology Consult Service  Pager: [phone]    I spent a total of 50 minutes face to face with  Ada Welch during today's office visit. I have spent an additional 30 minutes today on chart review and documentation.

## 2023-04-14 ENCOUNTER — MYC MEDICAL ADVICE (OUTPATIENT)
Dept: INTERNAL MEDICINE | Facility: CLINIC | Age: 53
End: 2023-04-14
Payer: COMMERCIAL

## 2023-04-28 ENCOUNTER — VIRTUAL VISIT (OUTPATIENT)
Dept: SLEEP MEDICINE | Facility: CLINIC | Age: 53
End: 2023-04-28
Payer: COMMERCIAL

## 2023-04-28 DIAGNOSIS — M96.1 POSTLAMINECTOMY SYNDROME: ICD-10-CM

## 2023-04-28 DIAGNOSIS — M96.1 FAILED BACK SURGICAL SYNDROME: ICD-10-CM

## 2023-04-28 DIAGNOSIS — F51.04 CHRONIC INSOMNIA: ICD-10-CM

## 2023-04-28 DIAGNOSIS — R06.83 SNORING: Primary | ICD-10-CM

## 2023-04-28 PROCEDURE — 90791 PSYCH DIAGNOSTIC EVALUATION: CPT | Mod: VID | Performed by: PSYCHOLOGIST

## 2023-04-28 ASSESSMENT — SLEEP AND FATIGUE QUESTIONNAIRES
HOW LIKELY ARE YOU TO NOD OFF OR FALL ASLEEP WHILE LYING DOWN TO REST IN THE AFTERNOON WHEN CIRCUMSTANCES PERMIT: HIGH CHANCE OF DOZING
HOW LIKELY ARE YOU TO NOD OFF OR FALL ASLEEP WHILE SITTING QUIETLY AFTER LUNCH WITHOUT ALCOHOL: SLIGHT CHANCE OF DOZING
HOW LIKELY ARE YOU TO NOD OFF OR FALL ASLEEP WHILE WATCHING TV: MODERATE CHANCE OF DOZING
HOW LIKELY ARE YOU TO NOD OFF OR FALL ASLEEP WHEN YOU ARE A PASSENGER IN A CAR FOR AN HOUR WITHOUT A BREAK: SLIGHT CHANCE OF DOZING
HOW LIKELY ARE YOU TO NOD OFF OR FALL ASLEEP WHILE SITTING AND TALKING TO SOMEONE: WOULD NEVER DOZE
HOW LIKELY ARE YOU TO NOD OFF OR FALL ASLEEP IN A CAR, WHILE STOPPED FOR A FEW MINUTES IN TRAFFIC: WOULD NEVER DOZE
HOW LIKELY ARE YOU TO NOD OFF OR FALL ASLEEP WHILE SITTING INACTIVE IN A PUBLIC PLACE: WOULD NEVER DOZE
HOW LIKELY ARE YOU TO NOD OFF OR FALL ASLEEP WHILE SITTING AND READING: MODERATE CHANCE OF DOZING

## 2023-04-28 NOTE — PROGRESS NOTES
Pratik Cruz  was seen at the request of  Justice Ford Jr., MD for sleep evaluation.    02/22/2017 INITIAL HISTORY OF PRESENT ILLNESS:  Pratik Cruz is a 50 y.o. male is here to be evaluated for a sleep disorder.       CHIEF COMPLAINT:      The patient's complaints include excessive daytime fatigue and snoring since  6 mo back.    Denies  dry mouth and sore throat  Reports occasional nasal congestion afrin  Denies  morning headaches  Denies  interrupted sleep  Denies frequent leg movements  Denies symptoms concerning for parasomnia    The ESS (Bivins Sleepiness Score) taken on initial visit is 3 /24    The patient had tonsillectomy in the past      SLEEP ROUTINE AND LIFESTYLE 02/22/2017 :    Occupation:[employer]    Bed partner: his wife     Time to bed - wake up time on a workday : 11;30 to 6:45  Time to bed - wake up time on a day off: 12 to  7:30  Sleep onset latency: 10-15 min  Disruptions or awakenings: 0-1  Time to fall back into sleep: 10-15 min   Perceived sleep quality: 3/4  Perceived total sleep time:  7  hours.  Daytime naps: 0-1    Exercise routine: yes  Caffeine:       PREVIOUS SLEEP STUDIES:     none      DME:       PAST MEDICAL HISTORY:    Active Ambulatory Problems     Diagnosis Date Noted    Hypercholesteremia     Benign hypertension     Obesity 10/03/2014    [place] cardiac risk <10% in next 10 years 10/08/2015    BMI 36.0-36.9,adult 10/08/2015    Snores 10/08/2015    Impaired fasting glucose 07/06/2016     Resolved Ambulatory Problems     Diagnosis Date Noted    No Resolved Ambulatory Problems     No Additional Past Medical History                PAST SURGICAL HISTORY:    Past Surgical History   Procedure Laterality Date    Knee arthroscopy w/ lateral release Bilateral 2004         FAMILY HISTORY:                Family History   Problem Relation Age of Onset    Hypertension Mother     Dementia Mother        SOCIAL HISTORY:          Tobacco:   History   Smoking  Virtual Visit Details    Type of service:  Video Visit   Video Start Time: 8:54 AM  Video End Time:9:50 AM    Originating Location (pt. Location): Home  Distant Location (provider location):  On-site  Platform used for Video Visit: [facility]     SLEEP MEDICINE CONSULTATION  Sleep Psychology  2023    Name: Ada Welch MRN# [id number]   Age: 52 year old YOB: 1970     Date of Consultation: 2023  Consultation is requested by: Chely Montoya PA-C  [address] [address]  Primary care provider: Ellyn Rivera    Reason for Sleep Consultation     Ada Welch is a 52 year old female seen today for a behavioral sleep medicine consultation because of insomnia    Assessment and Plan     Sleep Diagnoses:            Chronic insomnia  Snoring   Suspected Sleep Apnea    Co-occurring Conditions:         Chronic Pain       Hypertension       Anxiety Disorder       Ehler Danlos Syndrome       POTS    Clinical Impressions:    Ada Welch was seen for a sleep psychology consultation and possible behavioral sleep intervention and treatment. History and clinical presentation is consistent with multifactored chronic insomnia associated with significant healthcare burden due 2 Tom-Danlos syndrome, POTS, and chronic pain.  She was initially seen by sleep medicine where a sleep study was deferred to sleep consultation and treatment for insomnia.    Ada is a suitable candidate for brief behavioral therapy for insomnia.  Targets for behavioral changes include some variation in sleep schedule, somewhat extended time in bed and insufficient stimulus control to discriminate the bed from other activities of daily life.    Recommendations and Counselin.  Reduce and compress sleep schedule to  hours in bed with a sleep schedule of 1030 PM- 6:30 AM.    2.  Use the bed only for sleep and intimacy, avoid watching TV in bedroom, or engaging in activities such as  "Status    Never Smoker   Smokeless Tobacco    Not on file       alcohol use:    History   Alcohol Use    0.0 oz/week    0 drink(s) per week                   ALLERGIES:  Review of patient's allergies indicates:  No Known Allergies    CURRENT MEDICATIONS:    Current Outpatient Prescriptions   Medication Sig Dispense Refill    atorvastatin (LIPITOR) 40 MG tablet Take 1 tablet (40 mg total) by mouth once daily. 90 tablet 3    hydrochlorothiazide (HYDRODIURIL) 25 MG tablet TAKE 1 TABLET DAILY 90 tablet 3    metformin (GLUCOPHAGE) 500 MG tablet Take 1 tablet (500 mg total) by mouth 2 (two) times daily with meals. 180 tablet 3     No current facility-administered medications for this visit.                       REVIEW OF SYSTEMS:   Sleep related symptoms as per HPI    reports weight gain 15-20 ls  Denies dyspnea  Denies palpitations  Denies acid reflux   Denies polyuria  Denies  mood diturbance  Denies  anemia  Denies  muscle pain  Denies  Gait imbalance    Otherwise, a balance of 10 systems reviewed is negative.    PHYSICAL EXAM:  Visit Vitals    /70 (BP Location: Left arm, Patient Position: Sitting, BP Method: Automatic)    Pulse 68    Ht 5' 8" (1.727 m)    Wt 108.4 kg (239 lb)    BMI 36.34 kg/m2     GENERAL: Overweight body habitus, well groomed.  HEENT:   HEENT:  Conjunctivae are non-erythematous; Pupils equal, round, and reactive to light; Nose is symmetrical; Nasal mucosa is pink and moist; Septum is midline; Inferior turbinates are hypertrophied; Nasal airflow is normal; Posterior pharynx is pink; Modified Mallampati:III-IV; Posterior palate is low; Tonsils not visualized; Uvula is normal and pink;Tongue is indurated along the molar surface; Dentition is fair; No TMJ tenderness; Jaw opening and protrusion without click and without discomfort.  NECK: Supple. Neck circumference is 17 inches. No thyromegaly. No palpable nodes.     SKIN: On face and neck: No abrasions, no rashes, no lesions.  No " "clock watching, use of cell phone, iPad or computer.    3.  Make sure that you have a relaxing evening routine outside of the bed before going to bed to sleep.  Reading, listening to music, watching TV at a distance in a dimly lit room are all okay.    4.  Your earliest bedtime is 10:30 PM, but if on any given night he do not feel ready for sleep, it is okay to stay up a bit later until you feel more ready for sleep.    5.  It is important on the weekends that you get up at the same time, 6:30 AM.  You may benefit from getting active in the morning, getting bright morning sunlight or even using a bright light box for 30 minutes.  The parameters for light box should be 10,000 Lux full-spectrum light, with UV protection.  Follow manufactures instructions.  This is usually more beneficial and cloudy days.    7.  Focus on implementing the routine and try to avoid thinking about sleep or \"trying\" to get to sleep.  Mental exertion or worry about sleep only makes it harder to fall asleep.    8.  If you wake up in the middle of the night and have difficulty falling back to sleep, it is okay to use your ureter and do a little reading either sitting up in bed or getting out of bed then returning to bed when you feel more ready to sleep again.    9.  Try to keep caffeine away from your evening, we recommend discontinuing caffeine use by 2 PM.    Ada was provided information about the pathophysiology of insomnia, psychophysiological factors contributing to the onset and maintenance of insomnia and how co-occurring medical conditions and intrinsic sleep disorders can affect sleep.  Treatment options were discussed  as applicable to patient specific sleep concerns and symptoms. The benefits and potential early side effects of treatment including increased daytime sleepiness were discussed.     Patient was advised to consult with their prescribing provider around use of or changes to prescription sleep medication.  Patient was " "counseled on the importance of avoiding driving if drowsy.    Services provided are compliant with the requirements of [place] Statute SS 256B.0625 Subd. 3b and paragraph (b)     Follow-up: 4 weeks     History of Present Sleep Complaint     Ada Welch is a 52 year old year old female with a relevant medical history of  Snoring, Chronic Pain, Hypertension and Anxiety Disorder who presents with onset of sleep issues in 2013 secondary to back and spine issues and surgery involving spinal fusion from L4-L1.  Subsequent to this she reports complicatiions from surgery involved chronic and acute pain resulting in her being \"bedridden\" for about 5 years.  The referral for sleep evaluation was generated by her pain care program where she currently receives ongoing care.    She reports that the  of  Pain clinic has been helpful in managing chronic pain.     She reports she is now able to do basic activities of daily living including  Shopping and housework.  She keeps a schedule of two hour up and one hour of rest.  She takes duloxetine for management of chronic pain.  She has also seen a pain psychologists which was helpful.  She uses medical marijuana for Ehler Danlos Syndrome.    Patient snores, reports excessive tiredness,no witnessed apnea, excessive tiredness and fatigue, morning headaches.    She reports episodes of marked sleeplessness for 2-3 weeks that occurs 1-2 times a year She reports it is accompanied by motor restlessness without hypomanic symptoms or mood changes.  She relates it to POTS and Tom Danlos Syndrome.    He reports  has a later work schedule and travels so the weekends is when she has time with her  only on weekends.  She states this affects her sleep schedule     She thinks she is managing and coping with her concern of health issues well.    No historry of nightmares (except when taking melatonin).  No history of sleep walking and sleep talking    She does not report " "subcutaneous nodules are palpable.  RESPIRATORY: Chest is clear to auscultation.  Normal chest expansion and non-labored breathing at rest.  CARDIOVASCULAR: Normal S1, S2.  No murmurs, gallops or rubs. No carotid bruits bilaterally.  No edema. No clubbing. No cyanosis.    NEURO: Oriented to time, place and person. Normal attention span and concentration. Gait normal.    PSYCH: Affect is full. Mood is normal  MUSCULOSKELETAL: Moves 4 extremities. Gait normal.         Using [facility]: yes      ASSESSMENT:    1. Sleep Apnea NEC. The patient symptomatically has  excessive daytime sleepiness, snoring,  witnessed breathing pauses, excessive daytime fatigue, gasping for air in sleep and interrupted sleep  with exam findings of "a crowded oral airway and elevated body mass index. The patient has medical co-morbidities of hyperlipidemia and hypertension,  which can be worsened by NEETA. This warrants investigation for possible NEETA..          PLAN:    Diagnostic: Polysomnogram in lab. The nature of this procedure and its indication was discussed with the patient. he would  like to come discuss PSG results.    Weight loss strategies were discussed in detail           More than 25 minutes of this 45 minutes visit was spent in counseling: during our discussion today, we talked about the etiology of  NEETA as well as the potential ramifications of untreated sleep apnea, which could include daytime sleepiness, hypertension, heart disease and/or stroke.  We discussed potential treatment options, which could include weight loss, body positioning, continuous positive airway pressure (CPAP), or referral for surgical consideration. Meanwhile, he  is urged to avoid supine sleep, weight gain and alcoholic beverages since all of these can worsen NEETA.     Precautions: The patient was advised to abstain from driving should he feel sleepy or drowsy.    Follow up: MD/NP  after the sleep study has been completed.     Thank you for allowing me the " history of major depressive disorder.  She does report some history of anxiety.    Activities in bed: Read, Watch TV, Use phone, computer, or tablet    Prescribed or over the counter stimulants:      Prescribed or over the counter sleep medication: Amitriptyline    Previous sleep medications patients has tried: Havent been able to because theyre too expensive        SLEEP-WAKE SCHEDULE:    Work/School Days: Patient goes to school/work: No       Time to Bed:  9      Sleep Latency: Depends to fall asleep      Difficulty falling asleep:  Half the nights nights per week      Number of awakenings: Wvery night times per night due to Pain, Use the bathroom      Trouble falling back asleep Every time  times a week       Usually takes Aometimes hours to get back to sleep                Rise time: 6:30      Uses alarm? Yes    Weekends/Non-work Days/All Other Days      Usually gets into bed at 11       Sleep latency:  At least an hour       Rise time: 9      Uses alarm? No    Patient sleep estimates:      Average total sleep time:  4-5 hours a night       Patient estimate of sleep need: 9      Preferred sleep position(s): Side, Recliner     Naps      Intentional naps: Every day times a week      Duration:  An hour in duration      Feels better after a nap: Yes      Unintentional Dozing:  Never days per week      Driving accident or near-miss due to sleepiness/drowsiness? No    SLEEP DISRUPTIONS:    Breathing/Snoring      Patient snores:Yes      Other people complain about Her snoring: No      Patient has been told She stops breathing in Her sleep: No      She has issues with any of the following: Morning headaches, Morning mouth dryness, Getting up to urinate more than once    Movement:      Patient gets pain, discomfort, with an urge to move: Yes      Symptoms occur when She is resting: Yes      Symptoms occur more at night:No      Patient has been told She kicks Her legs at night:No     Behaviors in Sleep:      Ada CASTANON  opportunity to participate in the care of your patient.    This visit summary will be sent to referring provider via [employer]     Rebekah has experienced the following behaviors while sleeping: Recurring Nightmares      She has experienced sudden muscle weakness during the day: Yes    Caffeine, Alcohol and Other Substances:      Number of caffeinated beverages(per day: 3      Last caffeine use is usually: Between 6-7 [PM      Uses alcohol to promote sleep: No      Drinks alcohol near bedtime: No      FAMILY HISTORY OF SLEEP DISORDERS      Patient has a family member been diagnosed with a sleep disorder: No            SCALES      EPWORTH SLEEPINESS SCALE    Likeliness of dozing or falling  asleep:    Sitting and reading: Moderate chance of dozing    Watching TV: Moderate chance of dozing    Sitting, inactive in a public place (e.g. a theatre or a meeting): Would never doze    As a passenger in a car for an hour without a break: Slight chance of dozing    Lying down to rest in the afternoon when circumstances permit: High chance of dozing    Sitting and talking to someone: Would never doze    Sitting quietly after a lunch without alcohol: Slight chance of dozing    In a car, while stopped for a few minutes in traffic: Would never doze    Total score - Romeo: 9      INSOMNIA SEVERITY INDEX (NOEMI)      The Insomnia Severity Index measures the severity of insomnia symptoms over the past two weeks.    1. Difficulty falling asleep: Moderate    2. Difficulty staying asleep: Severe    3. Problems waking up too early: Severe    4. How SATISFIED/DISSATISFIED are youwith your CURRENT sleep pattern? Moderately Satisfied    5. How NOTICEABLE to others do you think your sleep problem is in terms of impairing the quality of your life? Much      6. How WORRIED/DISTRESSED are you about your sleep problem? Somewhat    7. To what extent do you consider your sleep problem to INTERFERE with your daily functioning (e.g. daytime fatigue, mood, ability to [place] at work/daily chores, concentration, memory, mood, etc.) CURRENTLY?   Much     NOEMI Total Score:  18    Guidelines for Scoring/Interpretation:   0-7 = No clinically significant insomnia   8-14 = Subthreshold insomnia   15-21 = Clinical insomnia (moderate severity)  22-28 = Clinical insomnia (severe)      STOP BANG     1. Snoring: Do you snore loudly (louder than talking or loud enough to be heard through closed doors)?  No    2. Tired: Do you often feel tired, fatigued, or sleepy during daytime?  Yes    3. Observed: Has anyone observed you stop breathing during your sleep?  No    4. Blood pressure: Do you have or are you being treated for high blood pressure?  Yes    5. BMI: BMI more than 35 kg/m2?  No    6. Age: Age over 50 yr old?  Yes    7. Neck circumference: Neck circumference greater than 40 cm?  No    8. Gender: Gender male?  No    STOP-BANG Total Score:3    ABHINAV Risk  0-2 Low risk for ABHINAV  3-4  Intermediate risk for ABHINAV  5-8 High risk        Previous Sleep Studies     None    Vitals     LMP  (LMP Unknown)      Medical History     Allergies:    Allergies   Allergen Reactions     Baclofen Other (See Comments)     Loss of muscle tone. Muscle weakness.     Bee Venom Shortness Of Breath, Palpitations, Anaphylaxis and Difficulty breathing     Patient does carry Epi-Pen     Chicken-Derived Products (Egg) Nausea and Diarrhea     Compazine [Prochlorperazine]      Extreme jittery     Food      Milk     Gabapentin      Dizzy     Gluten Meal GI Disturbance     Wheat bran and wheat     Pregabalin      Seasonal Allergies      Dust, mold     Topiramate      Pt does not remember reaction       Medications:    Current Outpatient Medications   Medication Sig Dispense Refill     acetaminophen (TYLENOL) 500 MG tablet Take 1,000 mg by mouth every 6 hours as needed for mild pain       amitriptyline (ELAVIL) 50 MG tablet TAKE 1 TABLET BY MOUTH EVERYDAY AT BEDTIME 30 tablet 3     atorvastatin (LIPITOR) 20 MG tablet Take 1 tablet (20 mg) by mouth daily 90 tablet 3     buprenorphine (BUTRANS) 10 MCG/HR WK patch Place 1 patch onto  the skin every 7 days 4 patch 1     butalbital-acetaminophen-caffeine (ESGIC) -40 MG tablet Take 1 tablet by mouth every 6 hours as needed for headaches 10 tablet 0     cetirizine (ZYRTEC) 10 MG tablet Take 10 mg by mouth daily as needed        co-enzyme Q-10 100 MG CAPS capsule Take 100 mg by mouth daily       DULoxetine (CYMBALTA) 30 MG capsule TAKE 2 CAPSULES BY MOUTH IN THE AM AND 1 CAPSULES AT BEDTIME 270 capsule 1     EPINEPHrine (EPIPEN/ADRENACLICK/OR ANY BX GENERIC EQUIV) 0.3 MG/0.3ML injection 2-pack INJECT 0.3 MLS (0.3 MG) INTO THE MUSCLE AS NEEDED FOR ANAPHYLAXIS  1     ferrous fumarate 65 mg, [place]. FE,-Vitamin C 125 mg (VITRON-C)  MG TABS tablet Take 1 tablet by mouth every other day 60 tablet 4     fluticasone (FLONASE) 50 MCG/ACT nasal spray Spray 2 sprays into both nostrils daily Call clinic to schedule follow up appointment. 48 mL 11     HYDROmorphone (DILAUDID) 2 MG tablet Take 2 tablets (4 mg) by mouth every 6 hours as needed for pain 27 tablet 0     ivabradine (CORLANOR) 7.5 MG tablet Take 1 tablet (7.5 mg) by mouth 2 times daily 180 tablet 3     Lidocaine (LIDOCARE) 4 % Patch Place 1-3 patches onto the skin every 24 hours To prevent lidocaine toxicity, patient should be patch free for 12 hrs daily. 20 patch 0     medical cannabis (Patient's own supply.  Not a prescription) Take 1 Dose by mouth See Admin Instructions Capsule formulation - uses as needed       metoclopramide (REGLAN) 10 MG tablet Take 1 tablet (10 mg) by mouth 4 times daily as needed (headache) 40 tablet 3     Multiple Vitamins-Minerals (MULTIVITAMIN PO) Take 1 tablet by mouth daily        naloxone (NARCAN) 4 MG/0.1ML nasal spray Spray 1 spray (4 mg) into one nostril alternating nostrils once as needed for opioid reversal every 2-3 minutes until assistance arrives 0.2 mL 0     naproxen sodium (ANAPROX) 220 MG tablet Take 440-660 mg by mouth daily as needed for moderate pain       Probiotic Product (PROBIOTIC DAILY PO)  Take 2 packets by mouth every morning        propranolol ER (INDERAL LA) 120 MG 24 hr capsule Take 1 capsule (120 mg) by mouth three times a week 39 capsule 3     Rhubarb (ESTROVEN COMPLETE) 4 MG TABS Take 1 tablet by mouth       rizatriptan (MAXALT-MLT) 5 MG ODT TAKE 1-2 TABLETS (5-10 MG) BY MOUTH AT ONSET OF HEADACHE FOR MIGRAINE (MAX 30 MG IN 24 HOURS) 18 tablet 11     SUMAtriptan (IMITREX STATDOSE) 6 MG/0.5ML pen injector kit Inject 0.5 ml (6 mg) subcutaneously at onset of migraine, May repeat in 1 hour , Max 12 mg/24 hrs 2 kit 11     tiZANidine (ZANAFLEX) 4 MG tablet Take 1 tablet (4 mg) by mouth 4 times daily as needed for muscle spasms 120 tablet 1     traMADol (ULTRAM) 50 MG tablet Take 1 tablet (50 mg) by mouth every 6 hours as needed for severe pain (7-10) 60 tablet 1     vitamin B complex with vitamin C (STRESS TAB) tablet Take 1 tablet by mouth daily       VITAMIN D, CHOLECALCIFEROL, PO Take 2,000 Units by mouth daily         Problem List:  Patient Active Problem List    Diagnosis Date Noted     Cervical radiculopathy 10/14/2022     Priority: Medium     Added automatically from request for surgery [id number]       Chronic sacroiliac joint pain 08/06/2021     Priority: Medium     Added automatically from request for surgery [id number]       Chronic right shoulder pain 02/17/2021     Priority: Medium     Arthropathy of cervical facet joint 01/25/2021     Priority: Medium     Added automatically from request for surgery [id number]       ERRONEOUS ENCOUNTER--DISREGARD 12/22/2020     Priority: Medium     Cervical pain 09/29/2020     Priority: Medium     Chronic pain of both shoulders 09/29/2020     Priority: Medium     Cervical facet joint syndrome 09/29/2020     Priority: Medium     Added automatically from request for surgery [id number]       Facet arthropathy, cervical 09/02/2020     Priority: Medium     Added automatically from request for surgery [id number]       Hiatal hernia 02/10/2020     Priority: Medium     s/p  hiatal hernia repair, nissen, PEG, 2/10 01/22/2020     Priority: Medium     Added automatically from request for surgery [id number]       POTS (postural orthostatic tachycardia syndrome) 10/23/2019     Priority: Medium     Autonomic dysfunction 06/07/2018     Priority: Medium     Low back pain of multiple sites of spine with sciatica 12/09/2017     Priority: Medium     Intractable chronic migraine without aura and without status migrainosus 12/09/2017     Priority: Medium     Obese 12/09/2017     Priority: Medium     Urinary urgency 08/30/2017     Priority: Medium     Nocturia 08/30/2017     Priority: Medium     Cervicalgia 08/25/2017     Priority: Medium     Headache 07/25/2017     Priority: Medium     Postlaminectomy syndrome, lumbar region 06/14/2017     Priority: Medium     Lymphadenopathy of left cervical region 03/27/2017     Priority: Medium     Easy bruising 10/13/2016     Priority: Medium     Head and neck lymphadenopathy 10/13/2016     Priority: Medium     Hepatomegaly 10/13/2016     Priority: Medium     Liver lesion 10/13/2016     Priority: Medium     Chronic left shoulder pain 09/05/2016     Priority: Medium     s/p TLIF L4-5,L5-S1 with solid arthrodesis 09/05/2016     Priority: Medium     Tom-Danlos syndrome 09/05/2016     Priority: Medium     Acquired hypothyroidism 07/22/2016     Priority: Medium     Anxiety 07/22/2016     Priority: Medium     Opioid dependence (H) 07/22/2016     Priority: Medium     Overview:   Treatment Agreement       Essential hypertension with goal blood pressure less than 140/90 07/22/2016     Priority: Medium     Low back pain 07/12/2016     Priority: Medium     Lumbar radiculopathy 02/18/2016     Priority: Medium     Encounter for genetic counseling and testing 01/05/2016     Priority: Medium     Overview:     [facility] Aortopathy Comprehensive Panel ordered 1/5/2016.       Sinus tachycardia 09/29/2015     Priority: Medium     Astigmatism 12/12/2014     Priority: Medium      Keratoconjunctivitis sicca (H) 12/12/2014     Priority: Medium     Presbyopia 12/12/2014     Priority: Medium     Opioid type dependence, continuous (H) 10/09/2014     Priority: Medium     Other acute postoperative pain 10/09/2014     Priority: Medium     Anxiety disorder 08/21/2014     Priority: Medium     Pain disorder associated with psychological and physical factors 08/21/2014     Priority: Medium     Low back pain radiating to both legs 07/08/2014     Priority: Medium     Arthritis of facet joints at multiple vertebral levels 01/01/2014     Priority: Medium     Spondylolisthesis of lumbar region 12/10/2013     Priority: Medium     Lumbar degenerative disc disease 11/21/2013     Priority: Medium     DNS (deviated nasal septum) 10/09/2012     Priority: Medium     Nasal turbinate hypertrophy 10/09/2012     Priority: Medium     Dysuria 07/12/2011     Priority: Medium     Chronic sinusitis with recurrent bronchitis 02/01/2007     Priority: Medium     Allergic rhinitis 01/09/2007     Priority: Medium     Cyst of fallopian tube 12/28/1996     Priority: Medium     Arthritis involving multiple sites 01/01/1986     Priority: Medium     Uterine endometriosis 01/01/1986     Priority: Medium        Past Medical/Surgical History:  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     Cervicalgia      Chronic sinusitis 00/2007    Diagnosed with chronic pansinusitis 2016     Coagulation disorder (H)     factor 8     CSF leak     Chiari malformation     Depression      Tom-Danlos disease      Eosinophilic esophagitis 08/2018     Gastroesophageal reflux disease 3/1/2017    I have large hiatal hernia     Hiatal hernia 2016    Have  adeline hiatel hernia     Hoarseness Unsure    It comes and goes     Hypertension      Hypothyroid      IBS (irritable bowel syndrome) with constipation     improved on Linzess     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     Irregular heart beat      Migraines 1/1/2007    Unsure of exact date     Nasal polyps 2013    Were revoved 03/2017, benign     Orthostatic hypotension      Other nervous system complications 1/2014    Autonomic nervous system disorder, neuralgia, neuropathy     Swelling, mass, or lump in head and neck 08/2016    Lymph node has been growing for last year     Thyroid disease 01/01/2008     Urinary incontinence      Urinary urgency      Patient Active Problem List    Diagnosis Date Noted     Cervical radiculopathy 10/14/2022     Priority: Medium     Added automatically from request for surgery [id number]       Chronic sacroiliac joint pain 08/06/2021     Priority: Medium     Added automatically from request for surgery [id number]       Chronic right shoulder pain 02/17/2021     Priority: Medium     Arthropathy of cervical facet joint 01/25/2021     Priority: Medium     Added automatically from request for surgery [id number]       ERRONEOUS ENCOUNTER--DISREGARD 12/22/2020     Priority: Medium     Cervical pain 09/29/2020     Priority: Medium     Chronic pain of both shoulders 09/29/2020     Priority: Medium     Cervical facet joint syndrome 09/29/2020     Priority: Medium     Added automatically from request for surgery [id number]       Facet arthropathy, cervical 09/02/2020     Priority: Medium     Added automatically from request for surgery [id number]       Hiatal hernia 02/10/2020     Priority: Medium     s/p hiatal hernia repair, nissen, PEG, 2/10 01/22/2020     Priority: Medium     Added automatically from request for surgery [id number]       POTS (postural orthostatic tachycardia syndrome) 10/23/2019     Priority: Medium     Autonomic dysfunction 06/07/2018     Priority: Medium      Low back pain of multiple sites of spine with sciatica 12/09/2017     Priority: Medium     Intractable chronic migraine without aura and without status migrainosus 12/09/2017     Priority: Medium     Obese 12/09/2017     Priority: Medium     Urinary urgency 08/30/2017     Priority: Medium     Nocturia 08/30/2017     Priority: Medium     Cervicalgia 08/25/2017     Priority: Medium     Headache 07/25/2017     Priority: Medium     Postlaminectomy syndrome, lumbar region 06/14/2017     Priority: Medium     Lymphadenopathy of left cervical region 03/27/2017     Priority: Medium     Easy bruising 10/13/2016     Priority: Medium     Head and neck lymphadenopathy 10/13/2016     Priority: Medium     Hepatomegaly 10/13/2016     Priority: Medium     Liver lesion 10/13/2016     Priority: Medium     Chronic left shoulder pain 09/05/2016     Priority: Medium     s/p TLIF L4-5,L5-S1 with solid arthrodesis 09/05/2016     Priority: Medium     Tom-Danlos syndrome 09/05/2016     Priority: Medium     Acquired hypothyroidism 07/22/2016     Priority: Medium     Anxiety 07/22/2016     Priority: Medium     Opioid dependence (H) 07/22/2016     Priority: Medium     Overview:   Treatment Agreement       Essential hypertension with goal blood pressure less than 140/90 07/22/2016     Priority: Medium     Low back pain 07/12/2016     Priority: Medium     Lumbar radiculopathy 02/18/2016     Priority: Medium     Encounter for genetic counseling and testing 01/05/2016     Priority: Medium     Overview:     [facility] Aortopathy Comprehensive Panel ordered 1/5/2016.       Sinus tachycardia 09/29/2015     Priority: Medium     Astigmatism 12/12/2014     Priority: Medium     Keratoconjunctivitis sicca (H) 12/12/2014     Priority: Medium     Presbyopia 12/12/2014     Priority: Medium     Opioid type dependence, continuous (H) 10/09/2014     Priority: Medium     Other acute postoperative pain 10/09/2014     Priority: Medium     Anxiety disorder  08/21/2014     Priority: Medium     Pain disorder associated with psychological and physical factors 08/21/2014     Priority: Medium     Low back pain radiating to both legs 07/08/2014     Priority: Medium     Arthritis of facet joints at multiple vertebral levels 01/01/2014     Priority: Medium     Spondylolisthesis of lumbar region 12/10/2013     Priority: Medium     Lumbar degenerative disc disease 11/21/2013     Priority: Medium     DNS (deviated nasal septum) 10/09/2012     Priority: Medium     Nasal turbinate hypertrophy 10/09/2012     Priority: Medium     Dysuria 07/12/2011     Priority: Medium     Chronic sinusitis with recurrent bronchitis 02/01/2007     Priority: Medium     Allergic rhinitis 01/09/2007     Priority: Medium     Cyst of fallopian tube 12/28/1996     Priority: Medium     Arthritis involving multiple sites 01/01/1986     Priority: Medium     Uterine endometriosis 01/01/1986     Priority: Medium     Patient Active Problem List   Diagnosis     Chronic left shoulder pain     s/p TLIF L4-5,L5-S1 with solid arthrodesis     Tom-Danlos syndrome     Postlaminectomy syndrome, lumbar region     Headache     Cervicalgia     Urinary urgency     Nocturia     Acquired hypothyroidism     Allergic rhinitis     Anxiety     Anxiety disorder     Astigmatism     Chronic sinusitis with recurrent bronchitis     Arthritis of facet joints at multiple vertebral levels     Arthritis involving multiple sites     Opioid dependence (H)     Cyst of fallopian tube     DNS (deviated nasal septum)     Dysuria     Easy bruising     Encounter for genetic counseling and testing     Essential hypertension with goal blood pressure less than 140/90     Head and neck lymphadenopathy     Hepatomegaly     Keratoconjunctivitis sicca (H)     Low back pain radiating to both legs     Liver lesion     Low back pain     Low back pain of multiple sites of spine with sciatica     Lumbar degenerative disc disease     Lumbar radiculopathy      Lymphadenopathy of left cervical region     Intractable chronic migraine without aura and without status migrainosus     Nasal turbinate hypertrophy     Obese     Opioid type dependence, continuous (H)     Other acute postoperative pain     Pain disorder associated with psychological and physical factors     Presbyopia     Spondylolisthesis of lumbar region     Sinus tachycardia     Uterine endometriosis     Autonomic dysfunction     POTS (postural orthostatic tachycardia syndrome)     s/p hiatal hernia repair, nissen, PEG, 2/10     Hiatal hernia     Facet arthropathy, cervical     Cervical pain     Chronic pain of both shoulders     Cervical facet joint syndrome     ERRONEOUS ENCOUNTER--DISREGARD     Arthropathy of cervical facet joint     Chronic right shoulder pain     Chronic sacroiliac joint pain     Cervical radiculopathy        Mental Health History     Prior Mental Health Diagnosis:   anxiety    Mental Health Treatment:   None reported she does report prior trials of antidepressant    Chemical Abuse/Treatment:    None reported      Family History     Family History   Problem Relation Age of Onset     Connective Tissue Disorder Mother         eds     Connective Tissue Disorder Sister         eds     Cancer Father         Spinal tumor grew into spinal cord, went in brain     Migraines Father      Diabetes Maternal Grandmother         Elderly diabetes     Heart Disease Maternal Grandmother      Hypertension Maternal Grandmother      Diabetes Paternal Grandmother         Elderly diabetes     Diabetes Paternal Grandfather         Elderly diabetes     Asthma Sister         Pediatric Asthma     Migraines Sister      Asthma Son         Pediatric Asthma     Thyroid Disease Sister         ,     Migraines Sister      Migraines Sister      Breast Cancer No family hx of        Social History     Social History     Socioeconomic History     Marital status:      Spouse name: Carry     Number of children: 5      Years of education: None     Highest education level: None   Tobacco Use     Smoking status: Former     Packs/day: 0.20     Years: 10.00     Pack years: 2.00     Types: Cigarettes     Start date: 1991     Quit date: 2013     Years since quittin.4     Smokeless tobacco: Never   Vaping Use     Vaping status: Never Used   Substance and Sexual Activity     Alcohol use: Yes     Comment: once a month     Drug use: Yes     Types: Marijuana     Comment: medical marijuana     Sexual activity: Yes     Partners: Male     Birth control/protection: Female Surgical   Other Topics Concern     Parent/sibling w/ CABG, MI or angioplasty before 65F 55M? No   Social History Narrative    5 kids: 26 son, 25 daughter, 21 daughter, 15 Leslie, 9 Ibeth yo     works at [employer] for disability        5 yo and 6 months grandchildren Marcelo and [facility]            Mental Status Examination     Ada presented as oriented X3 with speech and language intact.  The patient was cooperative throughout the evaluation with no signs of hallucinations, delusions, loosening of associations or other thought disturbance.  Mood was normal Affect was congruent with mood. Insight and judgement were intact.  Memory appeared intact for recent and remote elements.  There was no report of suicidal ideation, intention or plan. Attention and concentration were within normal.        Daryl Buenrostro PsyD, LP, DBSM  Diplomate, Behavioral Sleep Medicine  [facility]      Copy:   Ellyn Rivera, PA-C  [address] [address]    Note: This dictation was created using voice recognition software. This document may contain an error not identified before finalizing the document. If the error changes the accuracy of the document, I would appreciate it being brought to my attention.

## 2023-04-28 NOTE — NURSING NOTE
Is the patient currently in the state of [place]? YES    Visit mode:VIDEO    If the visit is dropped, the patient can be reconnected by: VIDEO VISIT: Send to e-mail at: [email or web address]    Will anyone else be joining the visit? NO      How would you like to obtain your AVS? MyChart    Are changes needed to the allergy or medication list? NO    Reason for visit: Video Visit (consult)

## 2023-04-29 NOTE — PATIENT INSTRUCTIONS
"1.  Reduce and compress sleep schedule to  hours in bed with a sleep schedule of 1030 PM- 6:30 AM.    2.  Use the bed only for sleep and intimacy, avoid watching TV in bedroom, or engaging in activities such as clock watching, use of cell phone, iPad or computer.    3.  Make sure that you have a relaxing evening routine outside of the bed before going to bed to sleep.  Reading, listening to music, watching TV at a distance in a dimly lit room are all okay.    4.  Your earliest bedtime is 10:30 PM, but if on any given night he do not feel ready for sleep, it is okay to stay up a bit later until you feel more ready for sleep.    5.  It is important on the weekends that you get up at the same time, 6:30 AM.  You may benefit from getting active in the morning, getting bright morning sunlight or even using a bright light box for 30 minutes.  The parameters for light box should be 10,000 Lux full-spectrum light, with UV protection.  Follow manufactures instructions.  This is usually more beneficial and cloudy days.    7.  Focus on implementing the routine and try to avoid thinking about sleep or \"trying\" to get to sleep.  Mental exertion or worry about sleep only makes it harder to fall asleep.    8.  If you wake up in the middle of the night and have difficulty falling back to sleep, it is okay to use your ureter and do a little reading either sitting up in bed or getting out of bed then returning to bed when you feel more ready to sleep again.    9.  Try to keep caffeine away from your evening, we recommend discontinuing caffeine use by 2 PM.  "

## 2023-05-01 RX ORDER — HYDROMORPHONE HYDROCHLORIDE 2 MG/1
2 TABLET ORAL EVERY 6 HOURS PRN
Qty: 10 TABLET | Refills: 0 | Status: SHIPPED | OUTPATIENT
Start: 2023-05-01 | End: 2023-05-12

## 2023-05-02 RX ORDER — TRAMADOL HYDROCHLORIDE 50 MG/1
50 TABLET ORAL EVERY 6 HOURS PRN
Qty: 60 TABLET | Refills: 1 | Status: CANCELLED | OUTPATIENT
Start: 2023-05-02
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
## 2023-05-09 ENCOUNTER — MYC MEDICAL ADVICE (OUTPATIENT)
Dept: ANESTHESIOLOGY | Facility: CLINIC | Age: 53
End: 2023-05-09
Payer: COMMERCIAL

## 2023-05-09 DIAGNOSIS — M96.1 POSTLAMINECTOMY SYNDROME: ICD-10-CM

## 2023-05-09 DIAGNOSIS — M96.1 POST LAMINECTOMY SYNDROME: ICD-10-CM

## 2023-05-09 DIAGNOSIS — M96.1 FAILED BACK SURGICAL SYNDROME: ICD-10-CM

## 2023-05-11 NOTE — TELEPHONE ENCOUNTER
Refill request    Medication: traMADol (ULTRAM) 50 MG tablet    Sig: Take 1 tablet (50 mg) by mouth every 6 hours as needed for severe pain (7-10) #60 with 1 refill.       SOLD to the pt on:     #28 tablets on 4/2/23.  #60 tablets on 4/14/23  #32 tablets on 5/2/23    LPN called the pharmacy to verify dates of refill.   Pharmacy was only able to fill 1 week at at time when prescription was split because medication is prescribed for acute pain.       Pt also refilled Dilaudid 2 mg 5/2/23, #10 tablets.       Last clinic appointment: 2/23/23  Next clinic appointment: 5/25/23    Last Drug Screen Collected: 8/5/21  Controlled Substance Agreement signed: 8/5/21      Preferred pharmacy:    [id number] [address]       LPN will rotue to Dr. Martinez and ask that he review.     Mya Cash LPN

## 2023-05-12 RX ORDER — HYDROMORPHONE HYDROCHLORIDE 2 MG/1
2 TABLET ORAL EVERY 6 HOURS PRN
Qty: 10 TABLET | Refills: 0 | Status: SHIPPED | OUTPATIENT
Start: 2023-05-12 | End: 2023-06-12

## 2023-05-12 RX ORDER — TRAMADOL HYDROCHLORIDE 50 MG/1
50 TABLET ORAL EVERY 6 HOURS PRN
Qty: 60 TABLET | Refills: 1 | Status: SHIPPED | OUTPATIENT
Start: 2023-05-12 | End: 2023-06-28

## 2023-05-15 ENCOUNTER — MYC MEDICAL ADVICE (OUTPATIENT)
Dept: INTERNAL MEDICINE | Facility: CLINIC | Age: 53
End: 2023-05-15
Payer: COMMERCIAL

## 2023-05-18 NOTE — TELEPHONE ENCOUNTER
[place] message sent to patient encouraging daughter to call back and approve for switch of appointments. Pt tentatively placed in appointment slot as daughter's appointment had been cancelled.     TYSHAWN RUSSO RN on 5/18/2023 at 7:08 AM

## 2023-05-23 NOTE — CONFIDENTIAL NOTE
Needs appt to discuss.  Her appt on Monday was a no-show (?)  Could be virtual if that's easier.  Thanks,  Ellyn Rivera MD  Internal Medicine

## 2023-05-24 ENCOUNTER — MYC MEDICAL ADVICE (OUTPATIENT)
Dept: INTERNAL MEDICINE | Facility: CLINIC | Age: 53
End: 2023-05-24
Payer: COMMERCIAL

## 2023-05-25 ENCOUNTER — OFFICE VISIT (OUTPATIENT)
Dept: ANESTHESIOLOGY | Facility: CLINIC | Age: 53
End: 2023-05-25
Payer: COMMERCIAL

## 2023-05-25 ENCOUNTER — APPOINTMENT (OUTPATIENT)
Dept: LAB | Facility: CLINIC | Age: 53
End: 2023-05-25
Payer: COMMERCIAL

## 2023-05-25 VITALS
HEART RATE: 72 BPM | OXYGEN SATURATION: 98 % | SYSTOLIC BLOOD PRESSURE: 116 MMHG | DIASTOLIC BLOOD PRESSURE: 73 MMHG | HEIGHT: 67 IN | WEIGHT: 191 LBS | BODY MASS INDEX: 29.98 KG/M2

## 2023-05-25 DIAGNOSIS — M54.16 LUMBAR RADICULOPATHY: Primary | ICD-10-CM

## 2023-05-25 DIAGNOSIS — M96.1 POSTLAMINECTOMY SYNDROME: ICD-10-CM

## 2023-05-25 DIAGNOSIS — M53.3 CHRONIC SACROILIAC JOINT PAIN: ICD-10-CM

## 2023-05-25 DIAGNOSIS — Z79.891 OPIOID CONTRACT EXISTS: ICD-10-CM

## 2023-05-25 DIAGNOSIS — G89.29 CHRONIC SACROILIAC JOINT PAIN: ICD-10-CM

## 2023-05-25 DIAGNOSIS — G89.18 PAIN FOLLOWING SURGERY OR PROCEDURE: ICD-10-CM

## 2023-05-25 DIAGNOSIS — M96.1 FAILED BACK SURGICAL SYNDROME: ICD-10-CM

## 2023-05-25 DIAGNOSIS — M54.12 CERVICAL RADICULOPATHY: ICD-10-CM

## 2023-05-25 PROCEDURE — 99000 SPECIMEN HANDLING OFFICE-LAB: CPT | Performed by: PATHOLOGY

## 2023-05-25 PROCEDURE — 99214 OFFICE O/P EST MOD 30 MIN: CPT | Performed by: ANESTHESIOLOGY

## 2023-05-25 PROCEDURE — 80373 DRUG SCREENING TRAMADOL: CPT | Mod: 90 | Performed by: PATHOLOGY

## 2023-05-25 PROCEDURE — 36415 COLL VENOUS BLD VENIPUNCTURE: CPT | Performed by: PATHOLOGY

## 2023-05-25 PROCEDURE — 80307 DRUG TEST PRSMV CHEM ANLYZR: CPT | Mod: 90 | Performed by: PATHOLOGY

## 2023-05-25 PROCEDURE — 80349 CANNABINOIDS NATURAL: CPT | Mod: 90 | Performed by: PATHOLOGY

## 2023-05-25 ASSESSMENT — ENCOUNTER SYMPTOMS
SINUS CONGESTION: 1
VOMITING: 0
HEMATURIA: 0
ORTHOPNEA: 1
STIFFNESS: 1
MEMORY LOSS: 1
FEVER: 0
SORE THROAT: 0
DISTURBANCES IN COORDINATION: 0
POLYDIPSIA: 1
ALTERED TEMPERATURE REGULATION: 1
INCREASED ENERGY: 1
SPUTUM PRODUCTION: 1
JOINT SWELLING: 1
SLEEP DISTURBANCES DUE TO BREATHING: 1
WEIGHT LOSS: 0
WHEEZING: 1
EYE PAIN: 1
PARALYSIS: 0
HOT FLASHES: 1
WEIGHT GAIN: 1
POOR WOUND HEALING: 1
HEARTBURN: 0
BRUISES/BLEEDS EASILY: 1
TREMORS: 1
DOUBLE VISION: 1
JAUNDICE: 0
SYNCOPE: 0
NUMBNESS: 1
POLYPHAGIA: 0
DIZZINESS: 1
SEIZURES: 0
RECTAL PAIN: 0
HYPOTENSION: 1
NAUSEA: 1
FLANK PAIN: 1
LIGHT-HEADEDNESS: 1
HALLUCINATIONS: 0
ARTHRALGIAS: 1
SHORTNESS OF BREATH: 1
NIGHT SWEATS: 1
MUSCLE CRAMPS: 1
HYPERTENSION: 1
SINUS PAIN: 1
DIFFICULTY URINATING: 1
BLOOD IN STOOL: 0
COUGH DISTURBING SLEEP: 0
SPEECH CHANGE: 1
MUSCLE WEAKNESS: 1
ABDOMINAL PAIN: 1
CONSTIPATION: 1
FATIGUE: 1
NAIL CHANGES: 1
EYE REDNESS: 1
COUGH: 0
HEMOPTYSIS: 0
BOWEL INCONTINENCE: 0
TASTE DISTURBANCE: 0
BACK PAIN: 1
TINGLING: 1
BLOATING: 1
PALPITATIONS: 1
SWOLLEN GLANDS: 1
HOARSE VOICE: 1
SKIN CHANGES: 0
HEADACHES: 1
DYSURIA: 0
DECREASED LIBIDO: 1
WEAKNESS: 1
NECK PAIN: 1
SNORES LOUDLY: 1
CHILLS: 1
NECK MASS: 1
DIARRHEA: 1
EYE WATERING: 1
POSTURAL DYSPNEA: 1
LEG PAIN: 1
EYE IRRITATION: 1
DECREASED APPETITE: 0
MYALGIAS: 1
LOSS OF CONSCIOUSNESS: 1
TROUBLE SWALLOWING: 1
EXERCISE INTOLERANCE: 1
DYSPNEA ON EXERTION: 1
SMELL DISTURBANCE: 0

## 2023-05-25 ASSESSMENT — PAIN SCALES - GENERAL: PAINLEVEL: SEVERE PAIN (6)

## 2023-05-25 NOTE — PROGRESS NOTES
[facility] : Progress Note    Date of visit: 5/31/2023    Interval history:    Ada Welch is a 52 year old female who is well known to me for multifocal chronic pain. The patient has  complex history of low back pain for ~25 years for which she underwent L4-S1 fusion (TLIF) 12/9/13.  Postoperatively, her back pain worsened (mainly LBP, but also radiated down both lower limbs in an S1 dermatomal distribution).   Underwent removal of posterior instrumentation (screws and guadalupe).  After fusion removal, her pain continued.  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  She even sought care from a EDS specialist in [place].  She is currently on disability.     She has a h/o significant autonomic dysfunction.  Since 2014, she reports that her heart rate has fluctuated from as low as  beats per minutes and her blood pressure also fluctuates at the same time without any aggravating factors.  The fluctuation of the blood pressure and heart rate happens when she is supine, sitting, standing, walking, etc.  She has seen cardiologist Dr. Russell who advised her to continue fludrocortisone, isometric exercise, low carbohydrate and low gluten diet.     She saw Dr. Singh for urinary urgency and incontinence.  She is advised for improving lifestyle, dietary modification, optimizing bowel regimen and to start pelvic physical therapy.     She saw Dr. Moncada for left upper extremity radicular pain along the C8 nerve root distribution.  Per Dr. Moncada's note, she does not have a surgical target which may explain her symptoms.  She still continues to report this symptom.  She had cervical medial branch nerve block x2 with us 50% pain relief.   However medial branch nerve radiofrequency ablation was not approved by the insurance.  She also had 2 cervical epidural steroid injections with moderate pain relief.      The patient saw Dr. Mack for  eosinophilic esophagitis, GERD, and dysphagia.    She had open hernia surgery by Dr. Mack in February 2020.  Her postoperative course was uneventful.        The patient saw  Dr. Noemy Nguyen for chronic migraines. Patient reports that her chronic intractable headache pain recently got worse. 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  She had multiple blood patches intermittently for her headache pain.     Recently saw Dr. Enciso for lower back pain secondary to persistent spinal pain after L4-S1 fusion.    12/09/2013 - L4-S1 fusion, TLIF (Dr. Williamson in [place], [place])   2014 - removal of posterior instrumentation (screws and guadalupe) with exploration of fusion (Dr. MIKE Lucero ([facility]))     She was recommended  for nonoperative treatment including epidural steroid injection,  medial branch nerve block/radiofrequency ablation of the lumbar MBB, and/or SI joint injections.  If these injections are proven to be ineffective she may need to extend her fusion up to L3-L4.     Currently she has been  managing her pain with stable dose of Butrans 10 mcg per hour q 7 days, tramadol 50 mg every 6 hours as needed which she refills every 2-3 months.  In addition she takes tizanidine 4 mg 4 times daily as needed.    She takes sumatriptan for breakthrough migraine.  Occasionally she takes small doses of oxycodone/Dilaudid when her pain gets significantly worse.      Today her main complaints are following:     She recently developed pain flare on the bilateral lower extremity.  She tried to manage her pain with narcotics but they afforded modest improvement in her pain.  She is interested in lumbar epidural steroid injection and sacroiliac joint injections.  In addition we will order a flex ex x-ray to rule out spinal instability.       [place] Prescription Monitoring Program:   Reviewed. No concerns     Review of Systems:  The 14 system ROS was reviewed and was negative except what is documented above and as  "follows.  Any bowel or bladder problems: none  Mood: okay    Physical Exam:  Vitals:    05/25/23 1036   BP: 116/73   BP Location: Right arm   Patient Position: Chair   Cuff Size: Adult Large   Pulse: 72   SpO2: 98%   Weight: 86.6 kg (191 lb)   Height: 1.702 m (5' 7\")       General: Awake in in moderate distress  Eyes: Sclerae are anicteric. PERRLA, EOMI   Neck: supple, no masses.   Lungs: unlabored.   Heart: regular rate and rhythm   Abdomen: soft non tender.  Extremities: Pulses are well palpable, no peripheral edema.   Musculoskeletal: 5/5 muscle strength in all extremities.  Unable to perform straight leg test due to discomfort.  Tenderness to palpation noted in the lower lumbar spine.  Sacroiliac joints are tender Dewey's test is positive  Neurologic exam:Sensation intact throughout all dermatomes bilateral upper extremities and lower extremities  Psychiatric;   Normal affect.   Skin: Warm and Dry.    Medications:  Current Outpatient Medications   Medication Sig Dispense Refill     acetaminophen (TYLENOL) 500 MG tablet Take 1,000 mg by mouth every 6 hours as needed for mild pain       amitriptyline (ELAVIL) 50 MG tablet TAKE 1 TABLET BY MOUTH EVERYDAY AT BEDTIME 30 tablet 3     atorvastatin (LIPITOR) 20 MG tablet Take 1 tablet (20 mg) by mouth daily 90 tablet 3     buprenorphine (BUTRANS) 10 MCG/HR WK patch Place 1 patch onto the skin every 7 days 4 patch 1     butalbital-acetaminophen-caffeine (ESGIC) -40 MG tablet Take 1 tablet by mouth every 6 hours as needed for headaches 10 tablet 0     cetirizine (ZYRTEC) 10 MG tablet Take 10 mg by mouth daily as needed        co-enzyme Q-10 100 MG CAPS capsule Take 100 mg by mouth daily       DULoxetine (CYMBALTA) 30 MG capsule TAKE 2 CAPSULES BY MOUTH IN THE AM AND 1 CAPSULES AT BEDTIME 270 capsule 1     EPINEPHrine (EPIPEN/ADRENACLICK/OR ANY BX GENERIC EQUIV) 0.3 MG/0.3ML injection 2-pack INJECT 0.3 MLS (0.3 MG) INTO THE MUSCLE AS NEEDED FOR ANAPHYLAXIS  1     " ferrous fumarate 65 mg, [place]. FE,-Vitamin C 125 mg (VITRON-C)  MG TABS tablet Take 1 tablet by mouth every other day 60 tablet 4     fluticasone (FLONASE) 50 MCG/ACT nasal spray Spray 2 sprays into both nostrils daily Call clinic to schedule follow up appointment. 48 mL 11     HYDROmorphone (DILAUDID) 2 MG tablet Take 1 tablet (2 mg) by mouth every 6 hours as needed for pain 10 tablet 0     ivabradine (CORLANOR) 7.5 MG tablet Take 1 tablet (7.5 mg) by mouth 2 times daily 180 tablet 3     Lidocaine (LIDOCARE) 4 % Patch Place 1-3 patches onto the skin every 24 hours To prevent lidocaine toxicity, patient should be patch free for 12 hrs daily. 20 patch 0     medical cannabis (Patient's own supply.  Not a prescription) Take 1 Dose by mouth See Admin Instructions Capsule formulation - uses as needed       metoclopramide (REGLAN) 10 MG tablet Take 1 tablet (10 mg) by mouth 4 times daily as needed (headache) 40 tablet 3     Multiple Vitamins-Minerals (MULTIVITAMIN PO) Take 1 tablet by mouth daily        naloxone (NARCAN) 4 MG/0.1ML nasal spray Spray 1 spray (4 mg) into one nostril alternating nostrils once as needed for opioid reversal every 2-3 minutes until assistance arrives 0.2 mL 0     naproxen sodium (ANAPROX) 220 MG tablet Take 440-660 mg by mouth daily as needed for moderate pain       Probiotic Product (PROBIOTIC DAILY PO) Take 2 packets by mouth every morning        propranolol ER (INDERAL LA) 120 MG 24 hr capsule Take 1 capsule (120 mg) by mouth three times a week 39 capsule 3     Rhubarb (ESTROVEN COMPLETE) 4 MG TABS Take 1 tablet by mouth       rizatriptan (MAXALT-MLT) 5 MG ODT TAKE 1-2 TABLETS (5-10 MG) BY MOUTH AT ONSET OF HEADACHE FOR MIGRAINE (MAX 30 MG IN 24 HOURS) 18 tablet 11     SUMAtriptan (IMITREX STATDOSE) 6 MG/0.5ML pen injector kit Inject 0.5 ml (6 mg) subcutaneously at onset of migraine, May repeat in 1 hour , Max 12 mg/24 hrs 2 kit 11     tiZANidine (ZANAFLEX) 4 MG tablet Take 1 tablet (4  mg) by mouth 4 times daily as needed for muscle spasms 120 tablet 1     traMADol (ULTRAM) 50 MG tablet Take 1 tablet (50 mg) by mouth every 6 hours as needed for severe pain 60 tablet 1     vitamin B complex with vitamin C (STRESS TAB) tablet Take 1 tablet by mouth daily       VITAMIN D, CHOLECALCIFEROL, PO Take 2,000 Units by mouth daily         Analgesic Medications:   Medications related to Pain Management (From now, onward)    None             LABORATORY VALUES:   Recent Labs   Lab Test 02/17/23  1037 11/13/20  1133    134   POTASSIUM 4.8 4.5   CHLORIDE 102 102   CO2 29 29   ANIONGAP 10 4   * 96   BUN 11.9 10   CR 0.80 0.83   JUAN 10.0 8.6       CBC RESULTS:   Recent Labs   Lab Test 06/24/22  1122   WBC 6.5   RBC 4.74   HGB 14.8   HCT 43.5   MCV 92   MCH 31.2   MCHC 34.0   RDW 12.1          Most Recent 3 INR's:  Recent Labs   Lab Test 01/30/20  1325 02/28/19  1045 12/17/18  0920   INR 0.93 0.97 0.90           ASSESSMENT:       Diagnoses       Codes Comments    Lumbar radiculopathy    -  Primary M54.16     Cervical radiculopathy     M54.12     Opioid contract exists     Z79.891     Postlaminectomy syndrome     M96.1     Chronic sacroiliac joint pain     M53.3, G89.29           PLAN:      Continue Butrans 10 mics per hour.  4 patches per month    Tramadol 50 mg daily as needed.  60 tablets dispensed.    Recommend over-the-counter lidocaine 4% patches for the most painful spots on lower back every 12 hours    Continue using medical cannabis    Continue methocarbamol 500 mg 4 times daily as needed.      Sumatriptan/rizatriptan for migraine headache as needed.     Amitriptyline 25 to 50 mg nightly.    Ordered lumbar epidural steroid injection and bilateral sacroiliac joint injection.  Risk of the procedure including bleeding, infection, failed procedure, nerve injury, paralysis discussed with her.  All questions were answered.  May consider lumbar MBB/RFA in the future.     Follow-up 6 months        Assessment will be ongoing with changes in treatment as indicated.  Benefits/risks/alternatives to treatment have been reviewed and the patient has been instructed to contact this office if they have any questions or concerns.  This plan of care has been discussed with the patient and the patient is in agreement.     Faiza Martinez MD, PHD        Answers for HPI/ROS submitted by the patient on 5/25/2023  General Symptoms: Yes  Skin Symptoms: Yes  HENT Symptoms: Yes  EYE SYMPTOMS: Yes  HEART SYMPTOMS: Yes  LUNG SYMPTOMS: Yes  INTESTINAL SYMPTOMS: Yes  URINARY SYMPTOMS: Yes  GYNECOLOGIC SYMPTOMS: Yes  BREAST SYMPTOMS: No  SKELETAL SYMPTOMS: Yes  BLOOD SYMPTOMS: Yes  NERVOUS SYSTEM SYMPTOMS: Yes  MENTAL HEALTH SYMPTOMS: No  Ear pain: No  Ear discharge: No  Hearing loss: Yes  Tinnitus: Yes  Nosebleeds: No  Congestion: Yes  Sinus pain: Yes  Trouble swallowing: Yes   Voice hoarseness: Yes  Mouth sores: No  Sore throat: No  Tooth pain: No  Gum tenderness: No  Bleeding gums: No  Change in taste: No  Change in sense of smell: No  Dry mouth: Yes  Hearing aid used: No  Neck lump: Yes  Fever: No  Loss of appetite: No  Weight loss: No  Weight gain: Yes  Fatigue: Yes  Night sweats: Yes  Chills: Yes  Increased stress: No  Excessive hunger: No  Excessive thirst: Yes  Feeling hot or cold when others believe the temperature is normal: Yes  Loss of height: No  Post-operative complications: Yes  Surgical site pain: Yes  Hallucinations: No  Change in or Loss of Energy: Yes  Hyperactivity: No  Confusion: Yes  Changes in hair: No  Changes in moles/birth marks: No  Itching: Yes  Rashes: No  Changes in nails: Yes  Acne: No  Hair in places you don't want it: No  Change in facial hair: No  Warts: No  Non-healing sores: Yes  Scarring: Yes  Flaking of skin: Yes  Color changes of hands/feet in cold : Yes  Sun sensitivity: No  Skin thickening: Yes  Eye pain: Yes  Vision loss: Yes  Dry eyes: Yes  Watery eyes: Yes  Eye bulging: No  Double  vision: Yes  Flashing of lights: Yes  Spots: Yes  Floaters: Yes  Redness: Yes  Crossed eyes: No  Tunnel Vision: No  Yellowing of eyes: No  Eye irritation: Yes  Chest pain or pressure: Yes  Fast or irregular heartbeat: Yes  Pain in legs with walking: Yes  Trouble breathing while lying down: Yes  Fingers or toes appear blue: Yes  High blood pressure: Yes  Low blood pressure: Yes  Fainting: No  Murmurs: No  Pacemaker: No  Varicose veins: No  Wake up at night with shortness of breath: Yes  Light-headedness: Yes  Exercise intolerance: Yes  Cough: No  Sputum or phlegm: Yes  Coughing up blood: No  Difficulty breating or shortness of breath: Yes  Snoring: Yes  Wheezing: Yes  Difficulty breathing on exertion: Yes  Nighttime Cough: No  Difficulty breathing when lying flat: Yes  Heart burn or indigestion: No  Nausea: Yes  Vomiting: No  Abdominal pain: Yes  Bloating: Yes  Constipation: Yes  Diarrhea: Yes  Blood in stool: No  Black stools: No  Rectal or Anal pain: No  Fecal incontinence: No  Yellowing of skin or eyes: No  Vomit with blood: No  Change in stools: Yes  Trouble holding urine or incontinence: Yes  Pain or burning: No  Trouble starting or stopping: Yes  Increased frequency of urination: Yes  Blood in urine: No  Decreased frequency of urination: No  Frequent nighttime urination: Yes  Flank pain: Yes  Difficulty emptying bladder: Yes  Bleeding or spotting between periods: No  Heavy or painful periods: No  Irregular periods: Yes  Vaginal discharge: No  Hot flashes: Yes  Vaginal dryness: No  Genital ulcers: No  Reduced libido: Yes  Painful intercourse: No  Difficulty with sexual arousal: Yes  Post-menopausal bleeding: No  Back pain: Yes  Muscle aches: Yes  Neck pain: Yes  Swollen joints: Yes  Joint pain: Yes  Bone pain: Yes  Muscle cramps: Yes  Muscle weakness: Yes  Joint stiffness: Yes  Bone fracture: No  Edema or swelling: Yes  Anemia: Yes  Swollen glands: Yes  Easy bleeding or bruising: Yes  Trouble with coordination:  No  Dizziness or trouble with balance: Yes  Fainting or black-out spells: Yes  Memory loss: Yes  Headache: Yes  Seizures: No  Speech problems: Yes  Tingling: Yes  Tremor: Yes  Weakness: Yes  Difficulty walking: Yes  Paralysis: No  Numbness: Yes

## 2023-05-25 NOTE — LETTER
Opioid / Opioid Plus Controlled Substance Agreement    This is an agreement between you and your provider about the safe and appropriate use of controlled substance/opioids prescribed by your care team. Controlled substances are medicines that can cause physical and mental dependence (abuse).    There are strict laws about having and using these medicines. We here at [facility] are committing to working with you in your efforts to get better. To support you in this work, we ll help you schedule regular office appointments for medicine refills. If we must cancel or change your appointment for any reason, we ll make sure you have enough medicine to last until your next appointment.     As a Provider, I will:  Listen carefully to your concerns and treat you with respect.   Recommend a treatment plan that I believe is in your best interest. This plan may involve therapies other than opioid pain medication.   Talk with you often about the possible benefits, and the risk of harm of any medicine that we prescribe for you.   Provide a plan on how to taper (discontinue or go off) using this medicine if the decision is made to stop its use.    As a Patient, I understand that opioid(s):   Are a controlled substance prescribed by my care team to help me function or work and manage my condition(s).   Are strong medicines and can cause serious side effects such as:  Drowsiness, which can seriously affect my driving ability  A lower breathing rate, enough to cause death  Harm to my thinking ability   Depression   Abuse of and addiction to this medicine  Need to be taken exactly as prescribed. Combining opioids with certain medicines or chemicals (such as illegal drugs, sedatives, sleeping pills, and benzodiazepines) can be dangerous or even fatal. If I stop opioids suddenly, I may have severe withdrawal symptoms.  Do not work for all types of pain nor for all patients. If they re not helpful, I may be asked to stop  them.    The risks, benefits and side effects of these medicine(s) were explained to me. I agree that:  I will take part in other treatments as advised by my care team. This may be psychiatry or counseling, physical therapy, behavioral therapy, group treatment or a referral to a specialist.     I will keep all my appointments. I understand that this is part of the monitoring of opioids. My care team may require an office visit for EVERY opioid/controlled substance refill. If I miss appointments or don t follow instructions, my care team may stop my medicine.    I will take my medicines as prescribed. I will not change the dose or schedule unless my care team tells me to. There will be no refills if I run out early.     I may be asked to come to the clinic and complete a urine drug test or complete a pill count at any time. If I don t give a urine sample or participate in a pill count, the care team may stop my medicine.    I will only receive prescriptions from this clinic for chronic pain. If I am treated by another provider for acute pain issues, I will tell them that I am taking opioid pain medication for chronic pain and that I have a treatment agreement with this provider. I will inform my [facility] care team within one business day if I am given a prescription for any pain medication by another healthcare provider. My [facility] care team can contact other providers and pharmacists about my use of any medicines.    It is up to me to make sure that I don t run out of my medicines on weekends or holidays. If my care team is willing to refill my opioid prescription without a visit, I must request refills only during office hours. Refills may take up to 7 business days to process. I will use one pharmacy to fill all my opioid and other controlled substance prescriptions. I will notify the clinic about any changes to my insurance or medication availability.    I am responsible for my prescriptions.  If the medicine/prescription is lost, stolen or destroyed, it will not be replaced. I also agree not to share controlled substance medicines with anyone.    I am aware I should not use any illegal or recreational drugs. I agree not to drink alcohol unless my care team says I can.       If I enroll in the [place] [facility] program, I will tell my care team prior to my next refill.     I will tell my care team right away if I become pregnant, have a new medical problem treated outside of my regular clinic, or have a change in my medications.    I understand that this medicine can affect my thinking, judgment and reaction time. Alcohol and drugs affect the brain and body, which can affect the safety of my driving. Being under the influence of alcohol or drugs can affect my decision-making, behaviors, personal safety, and the safety of others. Driving while impaired (DWI) can occur if a person is driving, operating, or in physical control of a car, motorcycle, boat, snowmobile, ATV, motorbike, off-road vehicle, or any other motor vehicle ([place] Statute 169A.20). I understand the risk if I choose to drive or operate any vehicle or machinery.    I understand that if I do not follow any of the conditions above, my prescriptions or treatment may be stopped or changed.          Opioids  What You Need to Know    What are opioids?   Opioids are pain medicines that must be prescribed by a doctor. They are also known as narcotics.     Examples are:   morphine (MS Contin, Florencia)  oxycodone (Oxycontin)  oxycodone and acetaminophen (Percocet)  hydrocodone and acetaminophen (Vicodin, Norco)   fentanyl patch (Duragesic)   hydromorphone (Dilaudid)   methadone  codeine (Tylenol #3)     What do opioids do well?   Opioids are best for severe short-term pain such as after a surgery or injury. They may work well for cancer pain. They may help some people with long-lasting (chronic) pain.     What do opioids NOT do well?   Opioids  never get rid of pain entirely, and they don t work well for most patients with chronic pain. Opioids don t reduce swelling, one of the causes of pain.                                    Other ways to manage chronic pain and improve function include:     Treat the health problem that may be causing pain  Anti-inflammation medicines, which reduce swelling and tenderness, such as ibuprofen (Advil, Motrin) or naproxen (Aleve)  Acetaminophen (Tylenol)  Antidepressants and anti-seizure medicines, especially for nerve pain  Topical treatments such as patches or creams  Injections or nerve blocks  Chiropractic or osteopathic treatment  Acupuncture, massage, deep breathing, meditation, visual imagery, aromatherapy  Use heat or ice at the pain site  Physical therapy   Exercise  Stop smoking  Take part in therapy       Risks and side effects     Talk to your doctor before you start or decide to keep taking opioids. Possible side effects include:    Lowering your breathing rate enough to cause death  Overdose, including death, especially if taking higher than prescribed doses  Worse depression symptoms; less pleasure in things you usually enjoy  Feeling tired or sluggish  Slower thoughts or cloudy thinking  Being more sensitive to pain over time; pain is harder to control  Trouble sleeping or restless sleep  Changes in hormone levels (for example, less testosterone)  Changes in sex drive or ability to have sex  Constipation  Unsafe driving  Itching and sweating  Dizziness  Nausea, throwing up and dry mouth    What else should I know about opioids?    Opioids may lead to dependence, tolerance, or addiction.    Dependence means that if you stop or reduce the medicine too quickly, you will have withdrawal symptoms. These include loose poop (diarrhea), jitters, flu-like symptoms, nervousness and tremors. Dependence is not the same as addiction.                     Tolerance means needing higher doses over time to get the same  effect. This may increase the chance of serious side effects.    Addiction is when people improperly use a substance that harms their body, their mind or their relations with others. Use of opiates can cause a relapse of addiction if you have a history of drug or alcohol abuse.    People who have used opioids for a long time may have a lower quality of life, worse depression, higher levels of pain and more visits to doctors.    You can overdose on opioids. Take these steps to lower your risk of overdose:    Recognize the signs:  Signs of overdose include decrease or loss of consciousness (blackout), slowed breathing, trouble waking up and blue lips. If someone is worried about overdose, they should call 911.    Talk to your doctor about Narcan (naloxone).   If you are at risk for overdose, you may be given a prescription for Narcan. This medicine very quickly reverses the effects of opioids.   If you overdose, a friend or family member can give you Narcan while waiting for the ambulance. They need to know the signs of overdose and how to give Narcan.     Don't use alcohol or street drugs.   Taking them with opioids can cause death.    Do not take any of these medicines unless your doctor says it s OK. Taking these with opioids can cause death:  Benzodiazepines, such as lorazepam (Ativan), alprazolam (Xanax) or diazepam (Valium)  Muscle relaxers, such as cyclobenzaprine (Flexeril)  Sleeping pills like zolpidem (Ambien)   Other opioids      How to keep you and other people safe while taking opioids:    Never share your opioids with others.  Opioid medicines are regulated by the Drug Enforcement Agency ([place]). Selling or sharing medications is a criminal act.    2. Be sure to store opioids in a secure place, locked up if possible. Young children can easily swallow them and overdose.    3. When you are traveling with your medicines, keep them in the original bottles. If you use a pill box, be sure you also carry a copy  of your medicine list from your clinic or pharmacy.    4. Safe disposal of opioids    Most pharmacies have places to get rid of medicine, called disposal kiosks. Medicine disposal options are also available in every [facility]. Search your county and  medication disposal  to find more options. You can find more details at:  https://[email or web address].[email or web address]-green/[email or web address]-[email or web address]-medications     I agree that my provider, clinic care team, and pharmacy may work with any city, state or federal law enforcement agency that investigates the misuse, sale, or other diversion of my controlled medicine. I will allow my provider to discuss my care with, or share a copy of, this agreement with any other treating provider, pharmacy or emergency room where I receive care.    I have read this agreement and have asked questions about anything I did not understand.    _______________________________________________________  Patient Signature - Ada Welch _____________________                   Date     _______________________________________________________  Provider Signature - Faiza Martinez MD   _____________________                   Date     _______________________________________________________  Witness Signature (required if provider not present while patient signing)   _____________________                   Date

## 2023-05-25 NOTE — LETTER
5/25/2023       RE: Ada Welch  [address] [facility] [address]     Dear Colleague,    Thank you for referring your patient, Ada Welch, to the [facility] FOR COMPREHENSIVE PAIN MANAGEMENT [place] at [facility]. Please see a copy of my visit note below.      [facility] for Comprehensive Chronic Pain Management : Progress Note    Date of visit: 5/31/2023    Interval history:    Ada Welch is a 52 year old female who is well known to me for multifocal chronic pain. The patient has  complex history of low back pain for ~25 years for which she underwent L4-S1 fusion (TLIF) 12/9/13.  Postoperatively, her back pain worsened (mainly LBP, but also radiated down both lower limbs in an S1 dermatomal distribution).   Underwent removal of posterior instrumentation (screws and guadalupe).  After fusion removal, her pain continued.  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  She even sought care from a EDS specialist in [place].  She is currently on disability.     She has a h/o significant autonomic dysfunction.  Since 2014, she reports that her heart rate has fluctuated from as low as  beats per minutes and her blood pressure also fluctuates at the same time without any aggravating factors.  The fluctuation of the blood pressure and heart rate happens when she is supine, sitting, standing, walking, etc.  She has seen cardiologist Dr. Russell who advised her to continue fludrocortisone, isometric exercise, low carbohydrate and low gluten diet.     She saw Dr. Singh for urinary urgency and incontinence.  She is advised for improving lifestyle, dietary modification, optimizing bowel regimen and to start pelvic physical therapy.     She saw Dr. Moncada for left upper extremity radicular pain along the C8 nerve root distribution.  Per Dr. Moncada's note, she does not have a surgical target which may explain her  symptoms.  She still continues to report this symptom.  She had cervical medial branch nerve block x2 with us 50% pain relief.   However medial branch nerve radiofrequency ablation was not approved by the insurance.  She also had 2 cervical epidural steroid injections with moderate pain relief.      The patient saw Dr. Mack for eosinophilic esophagitis, GERD, and dysphagia.    She had open hernia surgery by Dr. Mack in February 2020.  Her postoperative course was uneventful.        The patient saw  Dr. Noemy Nguyen for chronic migraines. Patient reports that her chronic intractable headache pain recently got worse. 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  She had multiple blood patches intermittently for her headache pain.     Recently saw Dr. Enciso for lower back pain secondary to persistent spinal pain after L4-S1 fusion.    12/09/2013 - L4-S1 fusion, TLIF (Dr. Williamson in [place], [place])   2014 - removal of posterior instrumentation (screws and guadalupe) with exploration of fusion (Dr. MIKE Lucero ([facility]))     She was recommended  for nonoperative treatment including epidural steroid injection,  medial branch nerve block/radiofrequency ablation of the lumbar MBB, and/or SI joint injections.  If these injections are proven to be ineffective she may need to extend her fusion up to L3-L4.     Currently she has been  managing her pain with stable dose of Butrans 10 mcg per hour q 7 days, tramadol 50 mg every 6 hours as needed which she refills every 2-3 months.  In addition she takes tizanidine 4 mg 4 times daily as needed.    She takes sumatriptan for breakthrough migraine.  Occasionally she takes small doses of oxycodone/Dilaudid when her pain gets significantly worse.      Today her main complaints are following:     She recently developed pain flare on the bilateral lower extremity.  She tried to manage her pain with narcotics but they afforded modest improvement in her pain.  She is interested in  "lumbar epidural steroid injection and sacroiliac joint injections.  In addition we will order a flex ex x-ray to rule out spinal instability.       [place] Prescription Monitoring Program:   Reviewed. No concerns     Review of Systems:  The 14 system ROS was reviewed and was negative except what is documented above and as follows.  Any bowel or bladder problems: none  Mood: okay    Physical Exam:  Vitals:    05/25/23 1036   BP: 116/73   BP Location: Right arm   Patient Position: Chair   Cuff Size: Adult Large   Pulse: 72   SpO2: 98%   Weight: 86.6 kg (191 lb)   Height: 1.702 m (5' 7\")       General: Awake in in moderate distress  Eyes: Sclerae are anicteric. PERRLA, EOMI   Neck: supple, no masses.   Lungs: unlabored.   Heart: regular rate and rhythm   Abdomen: soft non tender.  Extremities: Pulses are well palpable, no peripheral edema.   Musculoskeletal: 5/5 muscle strength in all extremities.  Unable to perform straight leg test due to discomfort.  Tenderness to palpation noted in the lower lumbar spine.  Sacroiliac joints are tender Dewey's test is positive  Neurologic exam:Sensation intact throughout all dermatomes bilateral upper extremities and lower extremities  Psychiatric;   Normal affect.   Skin: Warm and Dry.    Medications:  Current Outpatient Medications   Medication Sig Dispense Refill    acetaminophen (TYLENOL) 500 MG tablet Take 1,000 mg by mouth every 6 hours as needed for mild pain      amitriptyline (ELAVIL) 50 MG tablet TAKE 1 TABLET BY MOUTH EVERYDAY AT BEDTIME 30 tablet 3    atorvastatin (LIPITOR) 20 MG tablet Take 1 tablet (20 mg) by mouth daily 90 tablet 3    buprenorphine (BUTRANS) 10 MCG/HR WK patch Place 1 patch onto the skin every 7 days 4 patch 1    butalbital-acetaminophen-caffeine (ESGIC) -40 MG tablet Take 1 tablet by mouth every 6 hours as needed for headaches 10 tablet 0    cetirizine (ZYRTEC) 10 MG tablet Take 10 mg by mouth daily as needed       co-enzyme Q-10 100 MG " CAPS capsule Take 100 mg by mouth daily      DULoxetine (CYMBALTA) 30 MG capsule TAKE 2 CAPSULES BY MOUTH IN THE AM AND 1 CAPSULES AT BEDTIME 270 capsule 1    EPINEPHrine (EPIPEN/ADRENACLICK/OR ANY BX GENERIC EQUIV) 0.3 MG/0.3ML injection 2-pack INJECT 0.3 MLS (0.3 MG) INTO THE MUSCLE AS NEEDED FOR ANAPHYLAXIS  1    ferrous fumarate 65 mg, [place]. FE,-Vitamin C 125 mg (VITRON-C)  MG TABS tablet Take 1 tablet by mouth every other day 60 tablet 4    fluticasone (FLONASE) 50 MCG/ACT nasal spray Spray 2 sprays into both nostrils daily Call clinic to schedule follow up appointment. 48 mL 11    HYDROmorphone (DILAUDID) 2 MG tablet Take 1 tablet (2 mg) by mouth every 6 hours as needed for pain 10 tablet 0    ivabradine (CORLANOR) 7.5 MG tablet Take 1 tablet (7.5 mg) by mouth 2 times daily 180 tablet 3    Lidocaine (LIDOCARE) 4 % Patch Place 1-3 patches onto the skin every 24 hours To prevent lidocaine toxicity, patient should be patch free for 12 hrs daily. 20 patch 0    medical cannabis (Patient's own supply.  Not a prescription) Take 1 Dose by mouth See Admin Instructions Capsule formulation - uses as needed      metoclopramide (REGLAN) 10 MG tablet Take 1 tablet (10 mg) by mouth 4 times daily as needed (headache) 40 tablet 3    Multiple Vitamins-Minerals (MULTIVITAMIN PO) Take 1 tablet by mouth daily       naloxone (NARCAN) 4 MG/0.1ML nasal spray Spray 1 spray (4 mg) into one nostril alternating nostrils once as needed for opioid reversal every 2-3 minutes until assistance arrives 0.2 mL 0    naproxen sodium (ANAPROX) 220 MG tablet Take 440-660 mg by mouth daily as needed for moderate pain      Probiotic Product (PROBIOTIC DAILY PO) Take 2 packets by mouth every morning       propranolol ER (INDERAL LA) 120 MG 24 hr capsule Take 1 capsule (120 mg) by mouth three times a week 39 capsule 3    Rhubarb (ESTROVEN COMPLETE) 4 MG TABS Take 1 tablet by mouth      rizatriptan (MAXALT-MLT) 5 MG ODT TAKE 1-2 TABLETS (5-10 MG)  BY MOUTH AT ONSET OF HEADACHE FOR MIGRAINE (MAX 30 MG IN 24 HOURS) 18 tablet 11    SUMAtriptan (IMITREX STATDOSE) 6 MG/0.5ML pen injector kit Inject 0.5 ml (6 mg) subcutaneously at onset of migraine, May repeat in 1 hour , Max 12 mg/24 hrs 2 kit 11    tiZANidine (ZANAFLEX) 4 MG tablet Take 1 tablet (4 mg) by mouth 4 times daily as needed for muscle spasms 120 tablet 1    traMADol (ULTRAM) 50 MG tablet Take 1 tablet (50 mg) by mouth every 6 hours as needed for severe pain 60 tablet 1    vitamin B complex with vitamin C (STRESS TAB) tablet Take 1 tablet by mouth daily      VITAMIN D, CHOLECALCIFEROL, PO Take 2,000 Units by mouth daily         Analgesic Medications:   Medications related to Pain Management (From now, onward)      None               LABORATORY VALUES:   Recent Labs   Lab Test 02/17/23  1037 11/13/20  1133    134   POTASSIUM 4.8 4.5   CHLORIDE 102 102   CO2 29 29   ANIONGAP 10 4   * 96   BUN 11.9 10   CR 0.80 0.83   JUAN 10.0 8.6       CBC RESULTS:   Recent Labs   Lab Test 06/24/22  1122   WBC 6.5   RBC 4.74   HGB 14.8   HCT 43.5   MCV 92   MCH 31.2   MCHC 34.0   RDW 12.1          Most Recent 3 INR's:  Recent Labs   Lab Test 01/30/20  1325 02/28/19  1045 12/17/18  0920   INR 0.93 0.97 0.90           ASSESSMENT:       Diagnoses         Codes Comments    Lumbar radiculopathy    -  Primary M54.16     Cervical radiculopathy     M54.12     Opioid contract exists     Z79.891     Postlaminectomy syndrome     M96.1     Chronic sacroiliac joint pain     M53.3, G89.29             PLAN:    Continue Butrans 10 mics per hour.  4 patches per month  Tramadol 50 mg daily as needed.  60 tablets dispensed.  Recommend over-the-counter lidocaine 4% patches for the most painful spots on lower back every 12 hours  Continue using medical cannabis  Continue methocarbamol 500 mg 4 times daily as needed.    Sumatriptan/rizatriptan for migraine headache as needed.   Amitriptyline 25 to 50 mg nightly.  Ordered  lumbar epidural steroid injection and bilateral sacroiliac joint injection.  Risk of the procedure including bleeding, infection, failed procedure, nerve injury, paralysis discussed with her.  All questions were answered.  May consider lumbar MBB/RFA in the future.     Follow-up 6 months       Assessment will be ongoing with changes in treatment as indicated.  Benefits/risks/alternatives to treatment have been reviewed and the patient has been instructed to contact this office if they have any questions or concerns.  This plan of care has been discussed with the patient and the patient is in agreement.     Faiza Martinez MD, PHD        Answers for HPI/ROS submitted by the patient on 5/25/2023  General Symptoms: Yes  Skin Symptoms: Yes  HENT Symptoms: Yes  EYE SYMPTOMS: Yes  HEART SYMPTOMS: Yes  LUNG SYMPTOMS: Yes  INTESTINAL SYMPTOMS: Yes  URINARY SYMPTOMS: Yes  GYNECOLOGIC SYMPTOMS: Yes  BREAST SYMPTOMS: No  SKELETAL SYMPTOMS: Yes  BLOOD SYMPTOMS: Yes  NERVOUS SYSTEM SYMPTOMS: Yes  MENTAL HEALTH SYMPTOMS: No  Ear pain: No  Ear discharge: No  Hearing loss: Yes  Tinnitus: Yes  Nosebleeds: No  Congestion: Yes  Sinus pain: Yes  Trouble swallowing: Yes   Voice hoarseness: Yes  Mouth sores: No  Sore throat: No  Tooth pain: No  Gum tenderness: No  Bleeding gums: No  Change in taste: No  Change in sense of smell: No  Dry mouth: Yes  Hearing aid used: No  Neck lump: Yes  Fever: No  Loss of appetite: No  Weight loss: No  Weight gain: Yes  Fatigue: Yes  Night sweats: Yes  Chills: Yes  Increased stress: No  Excessive hunger: No  Excessive thirst: Yes  Feeling hot or cold when others believe the temperature is normal: Yes  Loss of height: No  Post-operative complications: Yes  Surgical site pain: Yes  Hallucinations: No  Change in or Loss of Energy: Yes  Hyperactivity: No  Confusion: Yes  Changes in hair: No  Changes in moles/birth marks: No  Itching: Yes  Rashes: No  Changes in nails: Yes  Acne: No  Hair in places you don't want  it: No  Change in facial hair: No  Warts: No  Non-healing sores: Yes  Scarring: Yes  Flaking of skin: Yes  Color changes of hands/feet in cold : Yes  Sun sensitivity: No  Skin thickening: Yes  Eye pain: Yes  Vision loss: Yes  Dry eyes: Yes  Watery eyes: Yes  Eye bulging: No  Double vision: Yes  Flashing of lights: Yes  Spots: Yes  Floaters: Yes  Redness: Yes  Crossed eyes: No  Tunnel Vision: No  Yellowing of eyes: No  Eye irritation: Yes  Chest pain or pressure: Yes  Fast or irregular heartbeat: Yes  Pain in legs with walking: Yes  Trouble breathing while lying down: Yes  Fingers or toes appear blue: Yes  High blood pressure: Yes  Low blood pressure: Yes  Fainting: No  Murmurs: No  Pacemaker: No  Varicose veins: No  Wake up at night with shortness of breath: Yes  Light-headedness: Yes  Exercise intolerance: Yes  Cough: No  Sputum or phlegm: Yes  Coughing up blood: No  Difficulty breating or shortness of breath: Yes  Snoring: Yes  Wheezing: Yes  Difficulty breathing on exertion: Yes  Nighttime Cough: No  Difficulty breathing when lying flat: Yes  Heart burn or indigestion: No  Nausea: Yes  Vomiting: No  Abdominal pain: Yes  Bloating: Yes  Constipation: Yes  Diarrhea: Yes  Blood in stool: No  Black stools: No  Rectal or Anal pain: No  Fecal incontinence: No  Yellowing of skin or eyes: No  Vomit with blood: No  Change in stools: Yes  Trouble holding urine or incontinence: Yes  Pain or burning: No  Trouble starting or stopping: Yes  Increased frequency of urination: Yes  Blood in urine: No  Decreased frequency of urination: No  Frequent nighttime urination: Yes  Flank pain: Yes  Difficulty emptying bladder: Yes  Bleeding or spotting between periods: No  Heavy or painful periods: No  Irregular periods: Yes  Vaginal discharge: No  Hot flashes: Yes  Vaginal dryness: No  Genital ulcers: No  Reduced libido: Yes  Painful intercourse: No  Difficulty with sexual arousal: Yes  Post-menopausal bleeding: No  Back pain: Yes  Muscle  aches: Yes  Neck pain: Yes  Swollen joints: Yes  Joint pain: Yes  Bone pain: Yes  Muscle cramps: Yes  Muscle weakness: Yes  Joint stiffness: Yes  Bone fracture: No  Edema or swelling: Yes  Anemia: Yes  Swollen glands: Yes  Easy bleeding or bruising: Yes  Trouble with coordination: No  Dizziness or trouble with balance: Yes  Fainting or black-out spells: Yes  Memory loss: Yes  Headache: Yes  Seizures: No  Speech problems: Yes  Tingling: Yes  Tremor: Yes  Weakness: Yes  Difficulty walking: Yes  Paralysis: No  Numbness: Yes

## 2023-05-25 NOTE — NURSING NOTE
Patient presents with:  Follow Up: Follow-up Neck, Shoulder, Back Pain      Severe Pain (6)     Pain Medications     Analgesics Other Refills Start End     acetaminophen (TYLENOL) 500 MG tablet          Sig - Route: Take 1,000 mg by mouth every 6 hours as needed for mild pain - Oral    Class: Historical    Analgesic Combinations Refills Start End     butalbital-acetaminophen-caffeine (ESGIC) -40 MG tablet    0 10/28/2022     Sig - Route: Take 1 tablet by mouth every 6 hours as needed for headaches - Oral    Class: Historical    Opioid Agonists Refills Start End     HYDROmorphone (DILAUDID) 2 MG tablet    0 5/12/2023     Sig - Route: Take 1 tablet (2 mg) by mouth every 6 hours as needed for pain - Oral    Class: E-Prescribe    Earliest Fill Date: 5/12/2023     traMADol (ULTRAM) 50 MG tablet    1 5/12/2023     Sig - Route: Take 1 tablet (50 mg) by mouth every 6 hours as needed for severe pain - Oral    Class: E-Prescribe    No prior authorization was found for this prescription.    Found prior authorization for another prescription for the same medication: Closed - Other    Opioid Partial Agonists Refills Start End     buprenorphine (BUTRANS) 10 MCG/HR WK patch    1 3/30/2023     Sig - Route: Place 1 patch onto the skin every 7 days - Transdermal    Class: E-Prescribe    No prior authorization was found for this prescription.    Found prior authorization for another prescription for the same medication: Approved          What medications are you using for pain? Tramadol, buprenorphine patch, Tylenol, Ibuprofen    (New patients only) Have you been seen by another pain clinic/ provider? no    (Return Patients only) What refills are you needing today? no

## 2023-05-25 NOTE — TELEPHONE ENCOUNTER
Per Dr. Rivera, pt needs to be seen for weight concerns. Routed to clinical coordinators to assist pt with scheduling a sooner appt, Dr. Rivera ok with video appointments.     TYSHAWN RUSSO RN on 5/25/2023 at 8:26 AM

## 2023-05-25 NOTE — PATIENT INSTRUCTIONS
Medications:    Continue medications as prescribed.       Imaging:    Flexion/extension Xrays ordered.    IMAGING SERVICES HOURS:    All imaging modalities are available from 7 a.m. - 9 p.m. Monday through Friday  X-ray, CT, MRI, and General Ultrasound appointments are available from 7 a.m. -3:30 p.m. on Saturdays  X-ray, CT and MRI appointments are available from 8 a.m. - 4:30 p.m. on Sundays  Please call [phone] to schedule imaging exams         Procedures:    Call to schedule your procedure: [phone] option #2    Epidural and SI Joint Injection    Your pre-procedure instructions are below, please call our clinic if you have any questions.      Treatment Planning:    Blood Drug Confirmation Screen ordered.     Controlled Substance Agreement signed.       Recommended Follow up:      Follow up as needed.           To speak with a nurse, schedule/reschedule/cancel a clinic appointment, or request a medication refill call: [phone]    You can also reach us by [employer]: [email or web address]       Procedure Information related to COVID-19     Please call [phone] option #2 to schedule, reschedule, or cancel your procedure appointment.   Phones are answered Monday - Friday from 08:00 - 4:30pm.  Leave a voicemail with your name, birth date, and phone number if no one is available to take your call.        You no longer need to test for COVID- 19 prior to your procedure/surgery, unless your physician specifically requests that you test. If you experience COVID symptoms or have tested positive for COVID-19 within 14 days of your scheduled surgery or procedure, please update our office right away and your procedure may have to be postponed.       The procedure center staff will call you several days before the procedure to review important information that you will need to know for the day of the procedure.     Please contact the clinic if you have further questions about this information  [phone].        Information related to Scheduling and Pre-Procedure Instructions:    If you must reschedule your procedure more than two times, you must follow up in clinic before rescheduling again.    Preparing for your procedure    CAUTION - FAILURE TO FOLLOW THESE PRE-PROCEDURE INSTRUCTIONS WILL RESULT IN YOUR PROCEDURE BEING RESCHEDULED.    Your Procedure: Epidural and SI Joint Injection      Pregnancy  If you are pregnant, or think you may be pregnant, please notify our staff. This may or may not affect the ability to perform the procedure.       You must have a  take you home after your procedure. Transportation by taxi or para-transit is okay as long as you have a responsible adult accompany you. You must provide your 's full name and contact number at time of check in.     Fasting Protocol Please have nothing to eat or drink 1 hour prior to arrival.   Medications If you take any medications, DO NOT STOP. Take your medications as usual the day of your procedure with a sip of water AT LEAST 2 HOURS PRIOR TO ARRIVAL.    Antibiotics If you are currently taking antibiotics, you must complete the entire dose 7 days prior to your scheduled procedure. You must be clear of any signs or symptoms of infection. If you begin antibiotics, please contact our clinic for instructions.     Fever, Chills, or Rash If you experience a fever of higher than 100 degrees, chills, rash, or open wounds during the one week before your procedure, please call the clinic to see if you may proceed with your procedure.      Medication Hold List  **Patients under Cardiology/Neurology care should consult their provider prior to the pain procedure to verify pre-procedure medication instructions. The information below contains general guidelines.**    Blood Thinners If you are taking daily ASPIRIN, PLAVIX, OR OTHER BLOOD THINNERS SUCH AS COUMADIN/WARFARIN, we will need your prescribing doctor to sign a release permitting  you to stop these medications. Once approved by your prescribing doctor - STOP ALL BLOOD THINNERS BASED ON THE TIME TABLE BELOW PRIOR TO YOUR PROCEDURE. If you have been instructed to stop WARFARIN(COUMADIN), you must have an INR lab drawn the day before your procedure. . Your INR must be within normal limits before we can perform your injection. MEDICATIONS CAN BE RESTARTED AFTER YOUR PROCEDURE.    14 DAY HOLD  Ticlid (ticlopidine)    10 DAY HOLD  Effient (Prasugel)    3 DAY HOLD  Xarelto (rivaroxaban) 7 DAY HOLD  Anacin, Bufferin, Ecotrin, Excedrin, Aggrenox (Aspirin)  Brilinta (ticagrelor)  Coumadin (Warfarin)  Pradexa (Dabigatran)  Elmiron (Pentosan)  Plavix (Clopidogrel Bisulfate)  Pletal (Cilostazol)    24 HOUR HOLD  Lovenox (enoxaparin)  Agrylin (Anagrelide)        Non-steroidal Anti-inflammatories (NSAIDs) DO NOT TAKE any non-steroidal anti-inflammatory medications (NSAIDs) listed on the table below. MEDICATIONS CAN BE RESTARTED AFTER YOUR PROCEDURE. Celebrex is OK to take and does not need to be discontinued.     Medications to stop:  3 DAY HOLD  Advil, Motrin (Ibuprofen)  Arthrotec (diciofenac sodium/misoprostol)  Clinoril (Sulindac)  Indocin (Indomethacin)  Lodine (Etodolac)  Toradol (Ketorolac)  Vicoprofen (Hydrocodone and Ibuprofen)  Voltaren (Diclofenac)  Apixaban (Eliquis)    14 DAY HOLD  Daypro (Oxaprozin)  Feldene (Piroxicam)   7 DAY HOLD  Aleve (Naproxen sodium)  Darvon compound (contains aspirin)  Naprosyn (Naproxen)  Norgesic Forte (contains aspirin)  Mobic (Meloxicam)  Oruvall (Ketoprofen)  Percodan (contains aspirin)  Relafen (Nabumetone)  Salsalate  Trilisate  Vitamin E (more than 400 mg per day)  Any medication containing aspirin                To speak with a nurse, schedule/reschedule/cancel a clinic appointment, or request a medication refill call: [phone]    You can also reach us by [employer]: [email or web address]

## 2023-05-25 NOTE — NURSING NOTE
RN reviewed AVS with patient. Patient to contact clinic if any questions/concerns. Patient verbalized understanding.    Skye Jernigan RN   no difficulties

## 2023-05-26 NOTE — MR AVS SNAPSHOT
After Visit Summary   10/5/2017    Ada Welch    MRN: [id number]           Patient Information     Date Of Birth          1970        Visit Information        Provider Department      10/5/2017 4:00 PM Kirk Russell MD [facility]        Today's Diagnoses     EDS (Tom-Danlos syndrome)    -  1    POTS (postural orthostatic tachycardia syndrome)          Care Instructions    You will be scheduled for a tilt/autonomic study with Dr Russell. Arlyn will contact you to schedule this.   Her number is [phone]     Testing Scheduled: 7 day heart monitor today and labs.    A follow up visit will be scheduled with Dr Russell's NP: Pia Perkins NP in about 4 months.     We encourage you to use My Chart as your primary form of communication if possible. If you need assistance in setting this up, please contact our office or ask at your follow up visit.     If you need a medication refill please contact your pharmacy. Please allow at least 3 business days for your refill to be completed.       Cardiology  Telephone Number    Ginny Bernabe [phone]   Or send a message to your provider via my chart.   For scheduling procedures:    [facility] [place]    Clinic appointments       [phone]   For the Device Clinic (Pacemakers and ICD's)   RN's :   Carla Wade  During business hours: [phone]    After business hours:   [phone]- select option 4 and ask for job code [id number].          As always, Thank you for trusting us with your health care needs!          Follow-ups after your visit        Follow-up notes from your care team     Return in about 4 months (around 2/5/2018) for pia perkins NP, labs today.      Your next 10 appointments already scheduled     Oct 06, 2017  9:00 AM CDT   XR VIDEO SPEECH EVALUATION WITH ESOPHAGRAM with UCXR2, UC GIGU RAD    [facility] ([facility]    [address] [facility]  ----- Message from Tamika Zimmerman sent at 5/26/2023 11:13 AM CDT -----  Regarding: Refill  hydrOXYzine HCL (ATARAX) 10 MG Tab - Refill  [facility] [address]       [facility] [address]   [phone]           Please bring a list of your current medicines to your exam. (Include vitamins, minerals and over-the-counter medicines.) Leave your valuables at home.  Tell the doctor if there is a chance you could be pregnant.  Do not eat for 4 hours before the exam. Keep drinking clear liquids until 2 hours before the exam.  You may take pain medicine (with a sip of water) up to 4 hours before the exam.  Do not swallow any other medicines unless your doctor tells you to. Talk to your doctor to be sure it s safe to stop your medicines.  Please call the Imaging Department at your exam site with any questions.            Oct 06, 2017  9:00 AM CDT   Video Swallow with JAYANT Shaw   [facility] ([facility])    [address] [place] [id number] [phone]           Please check in for this visit in Imaging on the 1st floor.            Oct 27, 2017  9:20 AM CDT   (Arrive by 9:05 AM)   Return Visit with Faiza Martinez MD   [facility] ([facility])    [address] [place] [address]   [phone]            Oct 30, 2017 10:00 AM CDT   (Arrive by 9:45 AM)   New Patient Visit with Jay Guajardo MD   [facility] ([facility])    [address] [place] [address]   [phone]            Nov 07, 2017  9:30 AM CST   (Arrive by 9:15 AM)   EMG with Jorge Pickard MD   [facility] ([facility])    [address] [place] [address]   [phone]           Do not use lotions or creams on the area to be tested. If you are on blood thinners (Warfarin, Coumadin, Lovenox, etc), please contact your primary care physician to check if it is safe to stop them 3 days prior to testing. If you  have anxiety, please check with your referring physician to obtain anti-anxiety medication for the procedure.            Nov 13, 2017 10:30 AM CST   (Arrive by 10:15 AM)   Return Visit with Ellyn Rivera MD   [facility] ([facility])    [address] [place] [address]   [phone]            Nov 30, 2017  9:30 AM CST   (Arrive by 9:15 AM)   New Patient Visit with Bertha Moncada MD   [facility] Neurosurgery ([facility])    [address] [place] [id number] [phone]            Dec 06, 2017 11:15 AM CST   (Arrive by 11:00 AM)   Return Visit with Wade Singh MD   [facility] ([facility])    [address] [place] [address]   [phone]            Dec 09, 2017  9:10 AM CST   (Arrive by 8:55 AM)   Return Visit with Seth Glaeano MD   [facility] ([facility])    [address] [place] [id number] [phone]              Future tests that were ordered for you today     Open Future Orders        Priority Expected Expires Ordered    Catecholamines fractioned free urine Routine 10/5/2017 12/21/2017 10/5/2017    Histamine urine Routine 10/5/2017 12/21/2017 10/5/2017    N-Methylhistamine Urine 24 hour Routine 10/5/2017 12/21/2017 10/5/2017    Prostaglandins D2 Urine: 24 hour Routine 10/5/2017 12/21/2017 10/5/2017            Who to contact     If you have questions or need follow up information about today's clinic visit or your schedule please contact [facility] directly at [phone].  Normal or non-critical lab and imaging results will be communicated to you by MyChart, letter or phone within 4 business days after the clinic has received the results. If you do not hear from us within 7 days, please contact the  "clinic through [employer] or phone. If you have a critical or abnormal lab result, we will notify you by phone as soon as possible.  Submit refill requests through [employer] or call your pharmacy and they will forward the refill request to us. Please allow 3 business days for your refill to be completed.          Additional Information About Your Visit        [employer] Information     [employer] gives you secure access to your electronic health record. If you see a primary care provider, you can also send messages to your care team and make appointments. If you have questions, please call your primary care clinic.  If you do not have a primary care provider, please call [phone] and they will assist you.        Care EveryWhere ID     This is your Care EveryWhere ID. This could be used by other organizations to access your [facility] medical records  [id number]        Your Vitals Were     Pulse Height Pulse Oximetry BMI (Body Mass Index)          139 1.702 m (5' 7\") 97% 31.39 kg/m2         Blood Pressure from Last 3 Encounters:   10/05/17 (!) 137/93   10/04/17 113/76   10/02/17 130/79    Weight from Last 3 Encounters:   10/05/17 90.9 kg (200 lb 6.4 oz)   10/04/17 90.8 kg (200 lb 3.2 oz)   10/02/17 90.3 kg (199 lb)              We Performed the Following     Mobile Telemetry        Primary Care Provider Office Phone # Fax #    Ellyn Rivera [phone]       [address] [place] [phone]  [facility] [address]        Equal Access to Services     CHRISTINA GUY AH: Hadii aad ku hadasho Soomaali, waaxda luqadaha, qaybta kaalmada adeegyada, odette tang. So [facility] [phone].    ATENCIÓN: Si habla español, tiene a sweeney disposición servicios gratuitos de asistencia lingüística. Llame al [phone].    We comply with applicable federal civil rights laws and [place] laws. We do not discriminate on the basis of race, color, national origin, age, disability, sex, sexual orientation, or " gender identity.            Thank you!     Thank you for choosing [facility]  for your care. Our goal is always to provide you with excellent care. Hearing back from our patients is one way we can continue to improve our services. Please take a few minutes to complete the written survey that you may receive in the mail after your visit with us. Thank you!             Your Updated Medication List - Protect others around you: Learn how to safely use, store and throw away your medicines at www.disposemymeds.org.          This list is accurate as of: 10/5/17 11:59 PM.  Always use your most recent med list.                   Brand Name Dispense Instructions for use Diagnosis    ADVAIR DISKUS 250-50 MCG/DOSE diskus inhaler   Generic drug:  fluticasone-salmeterol      Inhale 1 puff into the lungs 2 times daily as needed        albuterol 108 (90 BASE) MCG/ACT Inhaler    PROAIR HFA/PROVENTIL HFA/VENTOLIN HFA     Inhale 2 puffs into the lungs        amitriptyline 10 MG tablet    ELAVIL    90 tablet    Take 3 tablets (30 mg) by mouth At Bedtime    Chronic migraine without aura without status migrainosus, not intractable       buprenorphine 10 MCG/HR WK patch    BUTRANS    4 patch    Place 1 patch onto the skin every 7 days    Postlaminectomy syndrome of lumbar region       cetirizine 10 MG tablet    zyrTEC     Take 10 mg by mouth daily        COMPRESSION STOCKINGS     3 each    1 each daily 20-30 mmHg Thigh Length measure and fit, color and style per patient preference. Doff n jose per need.    Orthostatic hypotension       eletriptan 20 MG tablet    RELPAX    18 tablet    Take 1 tablet (20 mg) by mouth at onset of headache for migraine May repeat in 2 hours. Max 4 tablets/24 hours.    Chronic daily headache, Intractable migraine without aura and with status migrainosus       EPINEPHrine 0.3 MG/0.3ML injection 2-pack    EPIPEN/ADRENACLICK/or ANY BX GENERIC EQUIV     Inject 0.3 mg into the muscle        etodolac 400  MG tablet    LODINE     Take 400 mg by mouth        famotidine 20 MG tablet    PEPCID    90 tablet    Take 1 tablet (20 mg) by mouth At Bedtime    Hiatal hernia, Mast cell disorder       fluticasone 50 MCG/ACT spray    FLONASE     2 sprays        ketamine (KETALAR) 5%-gabapentin (NEURONTIN) 8%-lidocaine 2.5% 5%-8% Gel topical PLO cream     30 g    Apply 1 g topically 3 times daily as needed    Postlaminectomy syndrome of lumbosacral region, Postlaminectomy syndrome of lumbar region       meloxicam 7.5 MG tablet    MOBIC    7 tablet    Take 1 tablet (7.5 mg) by mouth daily    Chronic migraine without aura without status migrainosus, not intractable       metoclopramide 5 MG tablet    REGLAN    20 tablet    Take 1 tablet (5 mg) by mouth every 6 hours as needed    Intractable chronic migraine without aura and with status migrainosus       MULTIVITAMIN PO      Take by mouth daily        omeprazole 20 MG CR capsule    priLOSEC    90 capsule    Take 1 capsule (20 mg) by mouth daily    Hiatal hernia       ondansetron 4 MG ODT tab    ZOFRAN-ODT    60 tablet    Take 1-2 tablets (4-8 mg) by mouth every 12 hours as needed for nausea    Migraine without status migrainosus, not intractable, unspecified migraine type       polyethylene glycol powder    MIRALAX/GLYCOLAX     1 capful Take 1 capful by mouth daily        PROBIOTIC DAILY PO      Take by mouth 2 times daily 2 packets daily        SUMAtriptan 6 MG/0.5ML pen injector kit    IMITREX STATDOSE    2 kit    Inject 0.5 mLs (6 mg) Subcutaneous at onset of headache for migraine (may repeat once after 2 hrs) May repeat in 1 hour. Max 12 mg/24 hours.    Chronic migraine without aura without status migrainosus, not intractable       SYNTHROID 25 MCG tablet   Generic drug:  levothyroxine      Take 25 mcg by mouth daily        tiZANidine 4 MG tablet    ZANAFLEX    90 tablet    Take 2 tablets (8 mg) by mouth 3 times daily as needed for muscle spasms    Post laminectomy syndrome        traMADol 50 MG tablet    ULTRAM    15 tablet    Take 1 tablet (50 mg) by mouth every 6 hours as needed for pain maximum 4 tablet(s) per day    Migraine with aura and without status migrainosus, not intractable       zonisamide 25 MG capsule    Zonegran    70 capsule    Take 1 tablet (25 mg) once daily for 1 week, then 2 tablets once daily for 1 week, then 3 tablets once daily for 1 week, then 4 tablets once daily for 1 week.    Occipital neuritis

## 2023-05-30 RX ORDER — DIAZEPAM 5 MG
5-10 TABLET ORAL
Status: CANCELLED | OUTPATIENT
Start: 2023-05-30

## 2023-06-01 ENCOUNTER — TELEPHONE (OUTPATIENT)
Dept: ANESTHESIOLOGY | Facility: CLINIC | Age: 53
End: 2023-06-01
Payer: COMMERCIAL

## 2023-06-01 PROBLEM — G89.29 CHRONIC SACROILIAC JOINT PAIN: Status: ACTIVE | Noted: 2021-08-06

## 2023-06-01 PROBLEM — M53.3 CHRONIC SACROILIAC JOINT PAIN: Status: ACTIVE | Noted: 2021-08-06

## 2023-06-01 NOTE — TELEPHONE ENCOUNTER
Patient is scheduled for procedure with Dr. Martinez     Spoke with: Patient     Date of Procedure: 6/15     Location: [facility]     Informed patient they will need an adult  yes     Additional comments: Spoke with patient, they are aware of above date and time. Will reach out with any questions     Patient is aware pre-op RN will call 2-3 days prior to procedure with arrival time and instructions    Anna C. Schoenecker on 6/1/2023 at 2:25 PM

## 2023-06-02 LAB
11OH-THC SPEC-MCNC: 3.4 NG/ML
CANNABIDIOL SERPLBLD CFM-MCNC: NEGATIVE NG/ML
CANNABINOIDS SERPL-MCNC: 3.7 NG/ML
CANNABINOIDS SPEC QL CFM: POSITIVE
CARBOXYTHC SPEC-MCNC: 125.7 NG/ML

## 2023-06-05 LAB
O-NORTRAMADOL BLD-MCNC: 58.6 NG/ML
TRAMADOL BLD QL CFM: POSITIVE
TRAMADOL BLD-MCNC: 362.1 NG/ML

## 2023-06-08 ENCOUNTER — ANCILLARY PROCEDURE (OUTPATIENT)
Dept: RADIOLOGY | Facility: AMBULATORY SURGERY CENTER | Age: 53
End: 2023-06-08
Attending: ANESTHESIOLOGY
Payer: COMMERCIAL

## 2023-06-08 ENCOUNTER — HOSPITAL ENCOUNTER (OUTPATIENT)
Facility: AMBULATORY SURGERY CENTER | Age: 53
Discharge: HOME | End: 2023-06-08
Attending: ANESTHESIOLOGY | Admitting: ANESTHESIOLOGY
Payer: COMMERCIAL

## 2023-06-08 VITALS
BODY MASS INDEX: 29.98 KG/M2 | WEIGHT: 191 LBS | HEIGHT: 67 IN | DIASTOLIC BLOOD PRESSURE: 78 MMHG | RESPIRATION RATE: 16 BRPM | SYSTOLIC BLOOD PRESSURE: 121 MMHG | TEMPERATURE: 97.3 F | OXYGEN SATURATION: 100 % | HEART RATE: 61 BPM

## 2023-06-08 DIAGNOSIS — G89.29 CHRONIC SACROILIAC JOINT PAIN: ICD-10-CM

## 2023-06-08 DIAGNOSIS — M54.16 LUMBAR RADICULOPATHY: ICD-10-CM

## 2023-06-08 DIAGNOSIS — M53.3 CHRONIC SACROILIAC JOINT PAIN: ICD-10-CM

## 2023-06-08 PROCEDURE — G0260 INJ FOR SACROILIAC JT ANESTH: HCPCS | Mod: XS,RT

## 2023-06-08 PROCEDURE — 62323 NJX INTERLAMINAR LMBR/SAC: CPT | Mod: GC | Performed by: ANESTHESIOLOGY

## 2023-06-08 PROCEDURE — 27096 INJECT SACROILIAC JOINT: CPT | Mod: XS | Performed by: ANESTHESIOLOGY

## 2023-06-08 PROCEDURE — 62323 NJX INTERLAMINAR LMBR/SAC: CPT

## 2023-06-08 RX ORDER — METHYLPREDNISOLONE ACETATE 40 MG/ML
INJECTION, SUSPENSION INTRA-ARTICULAR; INTRALESIONAL; INTRAMUSCULAR; SOFT TISSUE DAILY PRN
Status: DISCONTINUED | OUTPATIENT
Start: 2023-06-08 | End: 2023-06-08 | Stop reason: HOSPADM

## 2023-06-08 RX ORDER — IOPAMIDOL 408 MG/ML
INJECTION, SOLUTION INTRATHECAL DAILY PRN
Status: DISCONTINUED | OUTPATIENT
Start: 2023-06-08 | End: 2023-06-08 | Stop reason: HOSPADM

## 2023-06-08 RX ORDER — DIAZEPAM 5 MG
5-10 TABLET ORAL
Status: COMPLETED | OUTPATIENT
Start: 2023-06-08 | End: 2023-06-08

## 2023-06-08 RX ORDER — LIDOCAINE HYDROCHLORIDE 10 MG/ML
INJECTION, SOLUTION EPIDURAL; INFILTRATION; INTRACAUDAL; PERINEURAL DAILY PRN
Status: DISCONTINUED | OUTPATIENT
Start: 2023-06-08 | End: 2023-06-08 | Stop reason: HOSPADM

## 2023-06-08 RX ORDER — BUPIVACAINE HYDROCHLORIDE 2.5 MG/ML
INJECTION, SOLUTION EPIDURAL; INFILTRATION; INTRACAUDAL DAILY PRN
Status: DISCONTINUED | OUTPATIENT
Start: 2023-06-08 | End: 2023-06-08 | Stop reason: HOSPADM

## 2023-06-08 RX ORDER — TRIAMCINOLONE ACETONIDE 40 MG/ML
INJECTION, SUSPENSION INTRA-ARTICULAR; INTRAMUSCULAR DAILY PRN
Status: DISCONTINUED | OUTPATIENT
Start: 2023-06-08 | End: 2023-06-08 | Stop reason: HOSPADM

## 2023-06-08 RX ADMIN — DIAZEPAM 10 MG: 5 TABLET ORAL at 10:53

## 2023-06-08 NOTE — DISCHARGE INSTRUCTIONS
PAIN INJECTION HOME CARE INSTRUCTIONS  Activity  -You may resume most normal activity levels with the exception of strenuous activity. It may help to move in ways that hurt before the injection, to see if the pain is still there, but do not overdo it.     -DO NOT remove bandaid for 24 hours  -DO NOT shower for 24 hours      Pain  -You may feel immediate pain relief and numbness for a period of time after the injection. This may indicate the medication has reached the right spot.  -Your pain may return after this short pain-free period, or may even be a little worse for a day or two. It may be caused by needle irritation or by the medication itself. The medications usually take two or three days to start working, but can take as long as a week.    -You may use an ice pack for 20 minutes every 2 hours for the first 24 hours  -You may use a heating pad after the first 24 hours  -You may use Tylenol (acetaminophen) every 4 hours or other pain medicines as directed by your physician    DID YOU RECEIVE STEROIDS TODAY?  Yes    Common side effects of steroids:  Not everyone will experience corticosteroid side effects. If side effects are experienced, they will gradually subside in the 7-10 day period following an injection. Most common side effects include:  -Flushed face and/or chest  -Feeling of warmth, particularly in the face but could be an overall feeling of warmth  -Increased blood sugar in diabetic patients  -Menstrual irregularities my occur. If taking hormone-based birth control an alternate method of birth control is recommended  -Sleep disturbances and/or mood swings are possible  -Leg cramps    PLEASE KEEP TRACK OF YOUR SYMPTOMS AND NOTE ANY CHANGES FOR YOUR DOCTOR.       Please contact us if you have:  -Severe pain  -Fever more than 101.5 degrees Fahrenheit  -Signs of infection at the injection site (redness, swelling, or drainage)      FOR PAIN CENTER PATIENTS:  If you have questions, please contact the Pain  Clinic at [phone] Option 1 between the hours of 7:00 am and 3:00 pm Monday through Friday. After office hours you can contact the on call provider by dialing [phone]. If you need immediate attention, we recommend that you go to a hospital emergency room or dial [phone]. If you need to Fax information, the number is [phone].      FOR PM&R PATIENTS:  For patients seen by the PM and R service, please call [phone]. (Monday through Friday 8a-5p.  After business hours and weekends call [phone] and ask for the PM and R doctor on call). If you need to fax a pain diary to PM&R the fax number is [phone]. If you are unable to fax, uploading to [employer] is encouraged, then send to provider. If you have procedure scheduling questions please call [phone] Option #2.

## 2023-06-08 NOTE — OP NOTE
Patient: Ada Welch Age: 52 year old   MRN: [id number] Attending: Dr. Martinez     Date of Visit: June 8, 2023      PAIN MEDICINE CLINIC PROCEDURE NOTE    ATTENDING CLINICIAN:    Faiza Martinez MD    ASSISTANT CLINICIAN:  Benjamin Muir DO      PREPROCEDURE DIAGNOSES:  1.  Lumbar radiculopathy  2.  Chronic low back pain       PROCEDURE(S) PERFORMED:  1.  Interlaminar lumbar epidural steroid injection (L3-L4)  2.  Bilateral sacroiliac joint injection  2.  Fluoroscopic guidance for the above-named procedure(s)      ANESTHESIA:  Local.    INDICATIONS:  Ada Welch is a 52 year old female with a history of  chronic low back pain failed back surgery syndrome.  The patient stated that the patient was in their usual state of health and denied recent anticoagulant use or recent infections.  Therefore, the plan is to perform above mentioned procedures.     Procedure Details:  The patient was met in the procedure room, where the patient was identified by name, medical record number and date of birth.  All of the patient s last minute questions were answered. Written informed consent was obtained and saved in the electronic medical record, after the risks, benefits, and alternatives were discussed with the patient.      A formal time-out procedure was performed, as per protocol, including patient name, title of procedure, and site of procedure, and all in the room concurred.  Routine monitors were applied.      The patient was placed in the prone position on the procedure room table.  All pressure points were checked and comfortably padded.  Routine monitors were placed.  Vital signs were stable.    A chlorhexidine prep was completed followed by sterile draping per standard procedure.     The AP fluoroscopic view was optimized for approach at L3-L4 interspace.  The skin over the interspace was infiltrated with 4-5 mL of 1% Lidocaine using a 25 gauge, 1.5 inch needle.  A 20-gauge 3-1/2 inch Tuohy needle was advanced under  fluoroscopic guidance with right paramedial approach until it touched lamina. Mutiple AP and lateral fluoroscopic images at this time  are taken as Tuohy needle was advanced to the epidural space. The epidural space was identified, without evidence of blood, cerebrospinal fluid, or parasthesia throughout. Needle tip placement within the epidural space was further confirmed with 1-2 mL of nonionic contrast agent, with the epidural space visualized in the AP and lateral fluoroscopic view(s) with appropriate spread of the agent with fat vacuolization and no intravascular or intrathecal spread noted. Next, 8 mL of a treatment solution containing 2 mL of preservative free 0.25% bupivacaine, 1 mL of Depo-Medrol 40 mg/mL and 5 mL preservative free normal saline was administered slowly. The needle was withdrawn.    Then we turned our attention to the  bilateral sacroiliac joint injections. AP fluoroscopic guidance was used to identify the right SI joint(s), with slight contralateral oblique tilt, until the joint was maximally visualized.   After 1% lidocaine infiltration using a 25 gauge 1.5 inch needle, a 3.5 inch spinal needle was introduced and advanced through the anesthetized plane and advanced to the joint space.  Depth was confirmed with lateral fluoroscopic guidance. After negative aspiration for heme, we injected 0.5 ml of Isovue contrast into SI joint. Images obtained.      After negative aspiration for heme, 2.5 mL of a treatment mixture containing 1 mL of triamcinolone (40 mg/ml) and 9 mL of bupivacaine 0.25% was injected into the SI joint, then the needle was slightly withdrawn and 2.5 mL of the above treatment solution was injected into the periarticular space.  The needle was then removed.  The same procedure was performed on the left side.    Light pressure was held at the puncture site(s) to prevent ecchymosis and oozing.  The patient's skin was cleansed, and hemostasis was confirmed.  Band-aids were  applied to the needle injection site(s).        Condition:    The patient remained awake and alert throughout the procedure.  The patient tolerated the procedure well and was monitored for approximately 15 minutes afterward in the post procedure area.  There were no immediate post procedure complications noted.  The patient was then discharged to home as per protocol.      Pre-procedure pain score: 8/10  Post-procedure pain score: 8/10

## 2023-06-10 LAB
AMPHET BLD CFM-MCNC: NEGATIVE NG/ML
APAP BLD-MCNC: NEGATIVE UG/ML
BARBITURATES SPEC-MCNC: NEGATIVE UG/ML
BENZODIAZ SPEC-MCNC: NEGATIVE NG/ML
BUPRENORPHINE SERPL-MCNC: NEGATIVE NG/ML
BZE BLD CFM-MCNC: NEGATIVE NG/ML
CARBOXYTHC BLD-MCNC: ABNORMAL NG/ML
CARISOPRODOL IA: NEGATIVE UG/ML
DECLARED MEDICATIONS: ABNORMAL
DRUGS BLD SCN NOM: ABNORMAL
DRUGS FLD: ABNORMAL
ETHANOL BLD-MCNC: NEGATIVE GM/DL
FENTANYL IA: NEGATIVE NG/ML
GABAPENTIN IA: NEGATIVE UG/ML
MEPERIDINE SERPLBLD-MCNC: NEGATIVE NG/ML
METHADONE SAL CFM-MCNC: NEGATIVE NG/ML
OPIATES SPEC-MCNC: NEGATIVE NG/ML
OXYCODONE SERPLBLD SCN-MCNC: NEGATIVE NG/ML
PCP SPEC-MCNC: NEGATIVE NG/ML
PROPOXYPH SPEC-MCNC: NEGATIVE NG/ML
TRAMADOL BLD-MCNC: ABNORMAL NG/ML

## 2023-06-12 ENCOUNTER — ANCILLARY PROCEDURE (OUTPATIENT)
Dept: GENERAL RADIOLOGY | Facility: CLINIC | Age: 53
End: 2023-06-12
Attending: INTERNAL MEDICINE
Payer: COMMERCIAL

## 2023-06-12 ENCOUNTER — MYC MEDICAL ADVICE (OUTPATIENT)
Dept: ANESTHESIOLOGY | Facility: CLINIC | Age: 53
End: 2023-06-12

## 2023-06-12 ENCOUNTER — OFFICE VISIT (OUTPATIENT)
Dept: INTERNAL MEDICINE | Facility: CLINIC | Age: 53
End: 2023-06-12
Payer: COMMERCIAL

## 2023-06-12 ENCOUNTER — MYC MEDICAL ADVICE (OUTPATIENT)
Dept: INTERNAL MEDICINE | Facility: CLINIC | Age: 53
End: 2023-06-12

## 2023-06-12 ENCOUNTER — LAB (OUTPATIENT)
Dept: LAB | Facility: CLINIC | Age: 53
End: 2023-06-12
Payer: COMMERCIAL

## 2023-06-12 VITALS
HEART RATE: 70 BPM | DIASTOLIC BLOOD PRESSURE: 91 MMHG | OXYGEN SATURATION: 99 % | WEIGHT: 205.5 LBS | BODY MASS INDEX: 32.19 KG/M2 | SYSTOLIC BLOOD PRESSURE: 132 MMHG

## 2023-06-12 DIAGNOSIS — F41.9 ANXIETY: ICD-10-CM

## 2023-06-12 DIAGNOSIS — Z98.1 S/P LUMBAR FUSION: ICD-10-CM

## 2023-06-12 DIAGNOSIS — R73.09 OTHER ABNORMAL GLUCOSE: ICD-10-CM

## 2023-06-12 DIAGNOSIS — Q79.60 EHLERS-DANLOS SYNDROME: Primary | ICD-10-CM

## 2023-06-12 DIAGNOSIS — G90.1 DYSAUTONOMIA (H): ICD-10-CM

## 2023-06-12 DIAGNOSIS — E03.9 ACQUIRED HYPOTHYROIDISM: ICD-10-CM

## 2023-06-12 DIAGNOSIS — F43.29 ADJUSTMENT DISORDER WITH MIXED EMOTIONAL FEATURES: ICD-10-CM

## 2023-06-12 DIAGNOSIS — M96.1 POSTLAMINECTOMY SYNDROME: ICD-10-CM

## 2023-06-12 DIAGNOSIS — Q79.60 EHLERS-DANLOS SYNDROME: ICD-10-CM

## 2023-06-12 DIAGNOSIS — F51.04 PSYCHOPHYSIOLOGICAL INSOMNIA: ICD-10-CM

## 2023-06-12 DIAGNOSIS — E78.2 MIXED HYPERLIPIDEMIA: ICD-10-CM

## 2023-06-12 LAB
CHOLEST SERPL-MCNC: 222 MG/DL
HBA1C MFR BLD: 5.9 %
HDLC SERPL-MCNC: 99 MG/DL
LDLC SERPL CALC-MCNC: 94 MG/DL
NONHDLC SERPL-MCNC: 123 MG/DL
T4 FREE SERPL-MCNC: 1.15 NG/DL (ref 0.9–1.7)
TRIGL SERPL-MCNC: 143 MG/DL
TSH SERPL DL<=0.005 MIU/L-ACNC: 4.42 UIU/ML (ref 0.3–4.2)

## 2023-06-12 PROCEDURE — 84439 ASSAY OF FREE THYROXINE: CPT | Performed by: PATHOLOGY

## 2023-06-12 PROCEDURE — 99000 SPECIMEN HANDLING OFFICE-LAB: CPT | Performed by: PATHOLOGY

## 2023-06-12 PROCEDURE — 99215 OFFICE O/P EST HI 40 MIN: CPT | Performed by: INTERNAL MEDICINE

## 2023-06-12 PROCEDURE — 83036 HEMOGLOBIN GLYCOSYLATED A1C: CPT | Mod: 90 | Performed by: PATHOLOGY

## 2023-06-12 PROCEDURE — 72100 X-RAY EXAM L-S SPINE 2/3 VWS: CPT | Performed by: STUDENT IN AN ORGANIZED HEALTH CARE EDUCATION/TRAINING PROGRAM

## 2023-06-12 PROCEDURE — 36415 COLL VENOUS BLD VENIPUNCTURE: CPT | Performed by: PATHOLOGY

## 2023-06-12 PROCEDURE — 84443 ASSAY THYROID STIM HORMONE: CPT | Performed by: PATHOLOGY

## 2023-06-12 PROCEDURE — 80061 LIPID PANEL: CPT | Performed by: PATHOLOGY

## 2023-06-12 RX ORDER — DOXEPIN HYDROCHLORIDE 10 MG/1
10 CAPSULE ORAL AT BEDTIME
Qty: 90 CAPSULE | Refills: 1 | Status: SHIPPED | OUTPATIENT
Start: 2023-06-12 | End: 2023-07-25

## 2023-06-12 RX ORDER — HYDROXYZINE HYDROCHLORIDE 25 MG/1
25-50 TABLET, FILM COATED ORAL EVERY 6 HOURS PRN
Qty: 20 TABLET | Refills: 1 | Status: SHIPPED | OUTPATIENT
Start: 2023-06-12 | End: 2023-07-11

## 2023-06-12 RX ORDER — HYDROMORPHONE HYDROCHLORIDE 2 MG/1
2 TABLET ORAL DAILY PRN
Qty: 10 TABLET | Refills: 0 | Status: SHIPPED | OUTPATIENT
Start: 2023-06-12 | End: 2023-08-10

## 2023-06-12 NOTE — PROGRESS NOTES
Shwethaory of Present Illness:  Ms. Welch is a 51 year old female who presents for  Chief Complaint   Patient presents with     Follow Up     Pt here to discuss weight issues and pain     Tiff is here today to follow-up on weight, pain, mental health and sleep    Amitryptyline at increased dose helping for sleep however, also corresponds to increase in weight    She is having a pain flare lately, got an SUNSHINE recently 6/8, still having a lot of pain, using tizanidine and tramadol, she got a short term rx for dilaudid, using cannabis as well  Considering repeat fusion    Having some home stressors, interested in seeing therapist      Detailed Medical History:  -EDS-Type 3 Hypermobility. 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 He reports having a Beighton score of 7-8 out of 9. She reports having genetic testing done, which was questionable for the MYLK Gene. She previously followed with a rheumatologist who is an EDS specialist in [place].  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 Russell, has ILR. Ivadrabine and propronolol helping.  -Cervical instability, chiari malformation:  Saw Dr. Moncada, cervical fusion not recommended, considering occipital blocks. Also traveled to [facility] to see Dr. Andrey Sofia.  -Chronic headaches: Daily tension, cervicogenic, migraine, intracranial hypotension. Has not responded well to injections. Hospitalized in 2017 with normal LP and MRI. At that time responded to DHA.  Had blood patch done 3/19 with good relief of pain. Previously failed amovig and emgality. Blood patch test repeated 9/19. Now managing with medical management, Uses fioricet,maxalt, aleve, tizanidine. Botox.  -Hiatal hernia: Recently saw Dr. Mack and had manometry testing done in Feb 2019 which revealed lower esoph pressure, hiatal hernia, possibly short esophagus, peristalsis was preserved. S/p Nissen surgery 2/20.  -Eosinophilic esophagitis: EGD 12/18 performed for food impaction showed  ring at GEJ and mucosal changes c/w EoE. Biopsies 8/18 positive for Eos.  -Lumbar pain: She underwent L4 to S1 fusion in 2013 followed by removal of instrumentation, which did not improve her symptoms. She met with Dr. Martinez in the pain clinic and was taken off high doses of Dilaudid and started on Suboxone in 2017, which dramatically improved her symptoms.  She continues to work with physical therapy twice a week as well as other complementary therapies.   -Cervical pain, no surgical targets per neurosurgery  -IBS, constipation: GES showed rapid emptying 10/17. XR video swallow showed no aspiration 10/17.  -Asthma  -Enlarged cervical lymph nodes: L level 2 node biopsied 10/18 with scant cellularity. Biopsied in past and benign with neg flow cytometry.  -Urinary urge incontinence/incomplete emptying  -Pelvic floor dysfunction, rectocele, Pel[facility]          -Chronic Pain: Has regimen of medical cannabis, tramadol,  butran, cymbalta and tizanidine. Uses medical cannabis  since 2018 which helps. Follows with Dr. Martinez.       Routine Health Maintenence:  Depression Screening:   PHQ-2 Score:         2/23/2023     8:24 AM 7/14/2022     9:12 AM   PHQ-2 ( 1999 Pfizer)   Q1: Little interest or pleasure in doing things 0 0   Q2: Feeling down, depressed or hopeless 0 0   PHQ-2 Score 0 0        Immunizations (zoster, pneumovax, flu, Tdap, Hep A/B):   There is no immunization history on file for this patient.  Lipids:   Recent Labs   Lab Test 04/26/18  1010   CHOL 244*   HDL 48*   *   TRIG 156*       Lung Ca Screening (>30 pk age 55-79 or >20 py age 50-79 + RF): n/a low pack years  Colonoscopy (50-75 yrs): 3/17 , due 2027   - One small hyperplastic polyp in the sigmoid colon, removed with a cold snare. Resected and retrieved. Diverticulosis in the sigmoid colon. Repeat colonoscopy in10 years for surveillance   Dexa (>65W or 70M yrs): n/a  Mammogram (40-75 yrs): 1/18 ordered, 6/22  Pap (21-65 yrs): 1/18 HPV neg,  due 1/23  HIV/HCV if risk factors:  HIV neg 4/18  Tob/EtOH: screen neg  Lifestyle factors: reviewed  Advanced Directive: deferred        History of Present Illness:  Ms. Welch is a 52 year old female who presents for  Chief Complaint   Patient presents with     Follow Up     Pt here to discuss weight issues and pain                                 A detailed Review of Systems was performed, verified and is negative except as documented in the HPI.  All health questionnaires were reviewed, verified and relevant information documented above.      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     Cervicalgia      Chronic sinusitis 00/2007    Diagnosed with chronic pansinusitis 2016     Coagulation disorder (H)     factor 8     CSF leak     Chiari malformation     Depression      Tom-Danlos disease      Eosinophilic esophagitis 08/2018     Gastroesophageal reflux disease 3/1/2017    I have large hiatal hernia     Hiatal hernia 2016    Have adeline hiatel hernia     Hoarseness Unsure    It comes and goes     Hypertension      Hypothyroid      IBS (irritable bowel syndrome) with constipation     improved on Linzess     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     Irregular heart beat      Migraines 1/1/2007    Unsure of exact date     Nasal polyps 2013    Were revoved 03/2017, benign     Orthostatic hypotension      Other nervous system complications 1/2014    Autonomic nervous system disorder, neuralgia, neuropathy     Swelling, mass, or lump in head and neck 08/2016    Lymph node has been growing for last year     Thyroid disease 01/01/2008     Urinary incontinence       Urinary urgency        Active Meds:  Current Outpatient Medications   Medication     acetaminophen (TYLENOL) 500 MG tablet     atorvastatin (LIPITOR) 20 MG tablet     buprenorphine (BUTRANS) 10 MCG/HR WK patch     butalbital-acetaminophen-caffeine (ESGIC) -40 MG tablet     cetirizine (ZYRTEC) 10 MG tablet     co-enzyme Q-10 100 MG CAPS capsule     doxepin (SINEQUAN) 10 MG capsule     DULoxetine (CYMBALTA) 30 MG capsule     EPINEPHrine (EPIPEN/ADRENACLICK/OR ANY BX GENERIC EQUIV) 0.3 MG/0.3ML injection 2-pack     ferrous fumarate 65 mg, Napaimute. FE,-Vitamin C 125 mg (VITRON-C)  MG TABS tablet     fluticasone (FLONASE) 50 MCG/ACT nasal spray     HYDROmorphone (DILAUDID) 2 MG tablet     hydrOXYzine (ATARAX) 25 MG tablet     ivabradine (CORLANOR) 7.5 MG tablet     Lidocaine (LIDOCARE) 4 % Patch     medical cannabis (Patient's own supply.  Not a prescription)     metoclopramide (REGLAN) 10 MG tablet     Multiple Vitamins-Minerals (MULTIVITAMIN PO)     naloxone (NARCAN) 4 MG/0.1ML nasal spray     naproxen sodium (ANAPROX) 220 MG tablet     Probiotic Product (PROBIOTIC DAILY PO)     propranolol ER (INDERAL LA) 120 MG 24 hr capsule     Rhubarb (ESTROVEN COMPLETE) 4 MG TABS     rizatriptan (MAXALT-MLT) 5 MG ODT     SUMAtriptan (IMITREX STATDOSE) 6 MG/0.5ML pen injector kit     tiZANidine (ZANAFLEX) 4 MG tablet     traMADol (ULTRAM) 50 MG tablet     vitamin B complex with vitamin C (STRESS TAB) tablet     VITAMIN D, CHOLECALCIFEROL, PO     Current Facility-Administered Medications   Medication     Botulinum Toxin Type A (BOTOX) 200 units injection 155 Units        Allergies:  Reviewed, refer to EMR    Relevant Social History:  Social History     Tobacco Use     Smoking status: Former     Packs/day: 0.20     Years: 10.00     Pack years: 2.00     Types: Cigarettes     Start date: 1991     Quit date: 2013     Years since quittin.5     Smokeless tobacco: Never   Vaping Use     Vaping status: Never Used    Substance Use Topics     Alcohol use: Yes     Comment: once a month     Drug use: Yes     Types: Marijuana     Comment: medical marijuana        Physical Exam:  Vitals: BP (!) 132/91 (BP Location: Right arm, Patient Position: Sitting, Cuff Size: Adult Regular)   Pulse 70   Wt 93.2 kg (205 lb 8 oz)   LMP  (LMP Unknown)   SpO2 99%   BMI 32.19 kg/m    Constitutional: Alert, oriented, pleasant, appears to be in pain  Head: Normocephalic, atraumatic  Eyes: Extra-ocular movements intact, pupils equally round bilaterally, no scleral icterus  Musculoskeletal: No edema, normal muscle tone, normal gait  Neurologic: Alert and oriented, cranial nerves 2-12 intact, grossly non-focal  Psychiatric: normal mentation, teary affect and mood      Diagnostics:  Labs reviewed in Epic          Assessment and Plan:  Ada was seen today for follow up.    Diagnoses and all orders for this visit:    Tom-Danlos syndrome  -     Spine  Referral; Future  -     X-RAY LUMBAR SPINE FLEX/EXTEN  -     X-RAY LUMBAR SPINE FLEX/EXTEN  -     XR Lumbar Spine 2/3 Views; Future    s/p TLIF L4-5,L5-S1 with solid arthrodesis  -     Spine  Referral; Future  -     X-RAY LUMBAR SPINE FLEX/EXTEN  -     X-RAY LUMBAR SPINE FLEX/EXTEN  -     XR Lumbar Spine 2/3 Views; Future    Mixed hyperlipidemia  Dysautonomia (H)  Acquired hypothyroidism  -     Lipid panel reflex to direct LDL Fasting; Future  -     TSH with free T4 reflex; Future    Adjustment disorder with mixed emotional features  -     Adult Mental Health  Referral; Future    Anxiety  Would try to avoid benzos given concommittant use of opioids and muscle relaxers  -     hydrOXYzine (ATARAX) 25 MG tablet; Take 1-2 tablets (25-50 mg) by mouth every 6 hours as needed for anxiety    BMI 32.0-32.9,adult  Discussed options for weight management. She would like to try medication assisted weight loss given she has tried lifestyle interventions. Additionally, will stop  potentially offending drugs.  Discussed drugs may not be covered by insurance. Discussed r/b/a and side effects.  Would also consider contrave or phentermine-topiramate though with phentermine would need to be cautious given history of tachycardia.  -     Hemoglobin A1c; Future    Psychophysiological insomnia  Will have her stop amitriptyline and consider this alternative  -     doxepin (SINEQUAN) 10 MG capsule; Take 1 capsule (10 mg) by mouth At Bedtime    Other abnormal glucose  -     Hemoglobin A1c; Future            Ellyn Rivera MD  Internal Medicine    >40 minutes spent today performing chart review, history and exam, counseling, care coordination, documentation and further activities as noted above exclusive of any procedures or EKG interpretation

## 2023-06-12 NOTE — PATIENT INSTRUCTIONS
To schedule your appointment with imaging, please call [phone].    Stop the elavil for now.    Replace with doxepin.    Can use hydroxyzine for anxiety.    Start semaglutide for weight if covered by insurance.  Check in with Dr. CASTANON in 4-5 weeks with video visit for review of this.

## 2023-06-12 NOTE — NURSING NOTE
Ada Welch is a 52 year old female that presents in clinic today for the following:     Chief Complaint   Patient presents with     Follow Up     Pt here to discuss weight issues and pain       The patient's allergies and medications were reviewed. The patient's vitals were obtained without incident. The patient does not have any other questions for the provider.     Royal Porter, EMT at 10:58 AM on 6/12/2023.  Primary Care Clinic: [phone]

## 2023-06-13 ENCOUNTER — MYC MEDICAL ADVICE (OUTPATIENT)
Dept: INTERNAL MEDICINE | Facility: CLINIC | Age: 53
End: 2023-06-13
Payer: COMMERCIAL

## 2023-06-13 ENCOUNTER — TELEPHONE (OUTPATIENT)
Dept: NEUROSURGERY | Facility: CLINIC | Age: 53
End: 2023-06-13
Payer: COMMERCIAL

## 2023-06-13 RX ORDER — TRAMADOL HYDROCHLORIDE 50 MG/1
50 TABLET ORAL EVERY 6 HOURS PRN
Qty: 60 TABLET | Refills: 0 | Status: CANCELLED | OUTPATIENT
Start: 2023-06-13

## 2023-06-13 NOTE — TELEPHONE ENCOUNTER
[facility] Call Center    Phone Message    May a detailed message be left on voicemail: yes     Reason for Call: Other: Patient called to set up appointment with Dr Burks for back pain with Tom-Danlos syndrome, history of spinal fusion, and patient states they have had significant changes in bladder function for about a month after lifting items from basement to upper floor. Patient having trouble with sensation to urinate or how to tell when finiished, as well as weakness and legs going numb when sitting. Patient was referred to Dr Burks, and patient declined to see others as he was the recomendation from primary provier Dr Rivera.      Action Taken: Message routed to:  Other: [id number]    Travel Screening: Not Applicable

## 2023-06-13 NOTE — TELEPHONE ENCOUNTER
Writer routed to Neurosurgery Nurse Pool.   Referral in [facility], Dr. Kimmie Stevenson, JACN  Neurosurgery

## 2023-06-13 NOTE — TELEPHONE ENCOUNTER
SPINE PATIENTS - NEW PROTOCOL PREVISIT    RECORDS RECEIVED FROM: Internal   REASON FOR VISIT: S/P lumbar fusion, Tom-Danlos syndrome   Date of Appt: 6/16/23 11:45am    NOTES (FOR ALL VISITS) STATUS DETAILS   OFFICE NOTE from referring provider Internal 6/12/23, 2/17/23 Ellyn Rivera MD @ [facility]-     OFFICE NOTE from other specialist Internal 5/25/23, 2/23/23, 7/14/22 Faiza Martinez MD @ [facility]-Comprehensive PM    3/28/23 Noemy Nguyen MD @ [facility]-Neuro    12/20/22 Chayo, Frank Thompson DO @ [facility]-Neuro    10/4/22 Fernie, Darrell Nation MD @ [facility]-Ortho    1/21/22 Anamaria Pop APRN CNP @ [facility] PM    See Additional Encounters   DISCHARGE SUMMARY from hospital Care Everywhere 12/9/13-12/15/13 Higinio Collazo MD @ [facility]     OPERATIVE REPORT Care Everywhere 12/9/13 Higinio Collazo MD @ [facility] L4-S1 OPEN TRANSFORAMINAL LUMBAR INTERBODY FUSION   EMG REPORT Internal [facility]  9/26/19 EMG   MEDICATION LIST Internal    IMAGING  (FOR ALL VISITS)     MRI (HEAD, NECK, SPINE) Internal [facility]  7/21/22 MR Lumbar Spine  7/9/22 MR Brain  7/9/22 MR Cervical Spine  6/29/20  MR Cervical Spine  11/2/19 MR Lumbar Spine  11/2/19  MR Thoracic Spine  11/2/19 MR Cervical Spine  7/25/18  MRA Neck  7/25/18 MRA Brain ([facility])  7/25/18 MR Brain    See Additional Imaging   XRAY (SPINE) *NEUROSURGERY* Internal [facility]  6/12/23  XR Lumbar Spine  2/17/23  XR Thoracic Spine  10/4/22  XR Spine Complete  5/31/22 XR Cervical Spine  8/5/21 XR Lumbar Bending    See Additional Imaging   CT (HEAD, NECK, SPINE) Internal [facility]  3/3/19 CT Head  7/7/18  CT Head  4/26/18 CT Lumbar Spine    See Additional Imaging

## 2023-06-13 NOTE — TELEPHONE ENCOUNTER
Refill request for 2 medications-     Medication: traMADol (ULTRAM) 50 MG tablet    Sig: Take 1 tablet (50 mg) by mouth every 6 hours as needed for severe pain    Dispensed: 60  Refills: 1  SOLD to the pt on: 5/12/23- #28 tablets, 5/27/23 #60 tablets, 6/12/23 #28 tablets on 6/12. (Pt has gotten a total of 116 tablets since 5/12/23.)    Pt only has 4 tabs remaining on profile.      Medication: buprenorphine (BUTRANS) 10 MCG/HR WK patch    Sig: Place 1 patch onto the skin every 7 days    Dispensed: 4  Refills: 1  SOLD to the pt on: 5/15/23    Last clinic appointment: 5/25/23  Next clinic appointment: Not scheduled.     Last Drug Screen Collected: 5/25/23  Controlled Substance Agreement signed: 5/25/23      Preferred pharmacy:    [id number] [address]    LPN will pend up prescriptions for Dr. Martinez to review.     Mya Cash LPN

## 2023-06-14 ENCOUNTER — TELEPHONE (OUTPATIENT)
Dept: CARE COORDINATION | Facility: CLINIC | Age: 53
End: 2023-06-14
Payer: COMMERCIAL

## 2023-06-14 ENCOUNTER — MYC MEDICAL ADVICE (OUTPATIENT)
Dept: ANESTHESIOLOGY | Facility: CLINIC | Age: 53
End: 2023-06-14
Payer: COMMERCIAL

## 2023-06-14 ENCOUNTER — DOCUMENTATION ONLY (OUTPATIENT)
Dept: INTERNAL MEDICINE | Facility: CLINIC | Age: 53
End: 2023-06-14
Payer: COMMERCIAL

## 2023-06-14 DIAGNOSIS — M96.1 POSTLAMINECTOMY SYNDROME: ICD-10-CM

## 2023-06-14 NOTE — TELEPHONE ENCOUNTER
Prior Authorization Retail Medication Request    Medication/Dose: Semaglutide-Weight Management (WEGOVY) 0.25 MG/0.5ML pen  ICD code (if different than what is on RX):    Previously Tried and Failed:    Rationale:      Insurance Name:    Insurance ID:    Coverage information:     Subscriber: [id number] PANCHO HENAO     Rel to sub: 01 - Self     Member ID: [id number]     Payor: [facility] Ph: [phone]     Benefit plan: [id number]-[employer]VANTAGE Ph: [phone]     Group number: [id number]     Member effective dates: from 01/01/22         Pharmacy Information (if different than what is on RX)  Name:  [facility]  Phone:

## 2023-06-14 NOTE — PROGRESS NOTES
Type of Form Received:     Form Received (Date) 6/14/23   Form Filled out Yes 7/3/23   Placed in provider folder Yes

## 2023-06-15 DIAGNOSIS — M79.604 LOW BACK PAIN RADIATING TO BOTH LEGS: Primary | ICD-10-CM

## 2023-06-15 DIAGNOSIS — M79.605 LOW BACK PAIN RADIATING TO BOTH LEGS: Primary | ICD-10-CM

## 2023-06-15 DIAGNOSIS — M54.50 LOW BACK PAIN RADIATING TO BOTH LEGS: Primary | ICD-10-CM

## 2023-06-16 ENCOUNTER — ANCILLARY PROCEDURE (OUTPATIENT)
Dept: GENERAL RADIOLOGY | Facility: CLINIC | Age: 53
End: 2023-06-16
Attending: NEUROLOGICAL SURGERY
Payer: COMMERCIAL

## 2023-06-16 ENCOUNTER — PRE VISIT (OUTPATIENT)
Dept: NEUROSURGERY | Facility: CLINIC | Age: 53
End: 2023-06-16

## 2023-06-16 ENCOUNTER — OFFICE VISIT (OUTPATIENT)
Dept: NEUROSURGERY | Facility: CLINIC | Age: 53
End: 2023-06-16
Attending: INTERNAL MEDICINE
Payer: COMMERCIAL

## 2023-06-16 ENCOUNTER — TELEPHONE (OUTPATIENT)
Dept: NEUROSURGERY | Facility: CLINIC | Age: 53
End: 2023-06-16

## 2023-06-16 VITALS
HEIGHT: 67 IN | WEIGHT: 203.4 LBS | SYSTOLIC BLOOD PRESSURE: 146 MMHG | HEART RATE: 58 BPM | BODY MASS INDEX: 31.92 KG/M2 | DIASTOLIC BLOOD PRESSURE: 99 MMHG | OXYGEN SATURATION: 97 %

## 2023-06-16 DIAGNOSIS — M79.605 LOW BACK PAIN RADIATING TO BOTH LEGS: ICD-10-CM

## 2023-06-16 DIAGNOSIS — M54.50 LOW BACK PAIN RADIATING TO BOTH LEGS: ICD-10-CM

## 2023-06-16 DIAGNOSIS — G43.719 INTRACTABLE CHRONIC MIGRAINE WITHOUT AURA AND WITHOUT STATUS MIGRAINOSUS: Primary | ICD-10-CM

## 2023-06-16 DIAGNOSIS — Q79.60 EHLERS-DANLOS SYNDROME: ICD-10-CM

## 2023-06-16 DIAGNOSIS — M79.604 LOW BACK PAIN RADIATING TO BOTH LEGS: ICD-10-CM

## 2023-06-16 DIAGNOSIS — Z98.1 S/P LUMBAR FUSION: ICD-10-CM

## 2023-06-16 PROCEDURE — 99203 OFFICE O/P NEW LOW 30 MIN: CPT | Performed by: NEUROLOGICAL SURGERY

## 2023-06-16 PROCEDURE — 72082 X-RAY EXAM ENTIRE SPI 2/3 VW: CPT | Performed by: STUDENT IN AN ORGANIZED HEALTH CARE EDUCATION/TRAINING PROGRAM

## 2023-06-16 RX ORDER — BUPRENORPHINE 10 UG/H
1 PATCH TRANSDERMAL
Qty: 4 PATCH | Refills: 1 | Status: SHIPPED | OUTPATIENT
Start: 2023-06-16 | End: 2023-08-22

## 2023-06-16 ASSESSMENT — PAIN SCALES - GENERAL: PAINLEVEL: EXTREME PAIN (8)

## 2023-06-16 NOTE — LETTER
6/16/2023       RE: Ada Welch  [address] [facility] [address]     Dear Colleague,    Thank you for referring your patient, Aad Welch, to the [facility] NEUROSURGERY CLINIC [place] at [facility]. Please see a copy of my visit note below.    Date: 6/16/2023    Ada Welch is a 52-year-old female with a history of Erler's Danlos syndrome who previously had L4-S1 instrumented fusion in 2013 in [facility] which apparently became loosened and she had to have a revision surgery by Dr. Lucero.  She had a long arduous recovery for about 5 years but eventually she came to a point where she was finally doing relatively well until couple months ago when she began to have recurrent pain in her low back with intermittent pain radiating down into both lower extremities not in a specific dermatomal distribution.  Pain has been quite severe and has significantly limited her daily activities.  She was recently seen by Dr. Enciso who recommended nonoperative treatment including injections and medial branch nerve block.  L3-4 fusion extension was discussed as a possibility.  She recently had L3-4 interlaminar epidural injection by Dr. Martinez which did not give her any significant relief.    In the past, she was also seen Dr. Moncada for left upper extremity radicular pain but it was felt that that she did not have a surgical lesion.  She was referred to a cervical medial branch nerve block which gave her about 50% relief.    She also has a history of significant autonomic dysfunction.  Since the 2014 she has had fluctuating heart rates ranging from 40 to 150 bpm.  She has been followed by Dr. Russell.    Past Medical History:   Diagnosis Date    Allergic rhinitis 09/2007    Anxiety     Arthritis 11/01/2013    Found during search for cause of back pain    Autoimmune disease (H) 2016    Immunosuppresive    Autonomic dysfunction     Bleeding disorder (H) 2016     Bruise easily    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    Cervicalgia     Chronic sinusitis 00/2007    Diagnosed with chronic pansinusitis 2016    Coagulation disorder (H)     factor 8    CSF leak     Chiari malformation    Depression     Tom-Danlos disease     Eosinophilic esophagitis 08/2018    Gastroesophageal reflux disease 3/1/2017    I have large hiatal hernia    Hiatal hernia 2016    Have adeline hiatel hernia    Hoarseness Unsure    It comes and goes    Hypertension     Hypothyroid     IBS (irritable bowel syndrome) with constipation     improved on Linzess    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    Irregular heart beat     Migraines 1/1/2007    Unsure of exact date    Nasal polyps 2013    Were revoved 03/2017, benign    Orthostatic hypotension     Other nervous system complications 1/2014    Autonomic nervous system disorder, neuralgia, neuropathy    Swelling, mass, or lump in head and neck 08/2016    Lymph node has been growing for last year    Thyroid disease 01/01/2008    Urinary incontinence     Urinary urgency      Past Surgical History:   Procedure Laterality Date    AS REPAIR OF NASAL SEPTUM  2009    repair broke nose    BACK SURGERY  12/09/2013  10/01/2014    Spinal fusion L4-S1, L5 moving 5/8ths in. Remove screws/rods    BIOPSY  01/01/2008 & 3/2017    mole biopsy, lymph node biopsy    COLONOSCOPY  3/2017    Colonoscopy and GI    ESOPHAGEAL IMPEDENCE FUNCTION TEST WITH 24 HOUR PH GREATER THAN 1 HOUR N/A 9/17/2019    Procedure: IMPEDANCE PH STUDY, ESOPHAGUS, 24 HOUR;  Surgeon: Raghavendra Gradne MD;  Location:  GI    ESOPHAGOSCOPY, GASTROSCOPY, DUODENOSCOPY (EGD), COMBINED N/A 8/3/2018    Procedure: COMBINED ESOPHAGOSCOPY, GASTROSCOPY, DUODENOSCOPY (EGD), BIOPSY SINGLE OR MULTIPLE;;  Surgeon: Juan Woodson MD;  Location:  GI    ESOPHAGOSCOPY, GASTROSCOPY, DUODENOSCOPY (EGD), COMBINED N/A 10/22/2018    Procedure: COMBINED  ESOPHAGOSCOPY, GASTROSCOPY, DUODENOSCOPY (EGD), BIOPSY SINGLE OR MULTIPLE;  Surgeon: Sadia Carolina MD;  [facility]    ESOPHAGOSCOPY, GASTROSCOPY, DUODENOSCOPY (EGD), COMBINED N/A 12/17/2018    Procedure: COMBINED ESOPHAGOSCOPY, GASTROSCOPY, DUODENOSCOPY (EGD);  Surgeon: Juan Woodson MD;  Location: [facility] GI    ESOPHAGOSCOPY, GASTROSCOPY, DUODENOSCOPY (EGD), COMBINED N/A 10/14/2022    Procedure: ESOPHAGOGASTRODUODENOSCOPY, WITH BIOPSY;  Surgeon: Jay Shaffer MD;  Location: [facility] OR    INJECT BLOCK MEDIAL BRANCH CERVICAL/THORACIC/LUMBAR Left 9/24/2020    Procedure: BLOCK, NERVE, FACET JOINT, MEDIAL BRANCH, DIAGNOSTIC;  Surgeon: Faiza Martinez MD;  [facility]    INJECT BLOCK MEDIAL BRANCH CERVICAL/THORACIC/LUMBAR Bilateral 10/8/2020    Procedure: Bilateral cervical 3, cervical 4, and cervical 5 medial branch nerve block;  Surgeon: Faiza Martinez MD;  Location: [facility] OR    INJECT BLOCK MEDIAL BRANCH CERVICAL/THORACIC/LUMBAR Right 3/4/2021    Procedure: Cervical 3, cervical 4, cervical 5 medial branch nerve blocks to target C3-C4 and C4-C5 facet joint;  Surgeon: Faiza Martinez MD;  Location: [facility] OR    INJECT EPIDURAL CERVICAL N/A 10/27/2022    Procedure: INJECTION, SPINE, CERVICAL, EPIDURAL, C7-T1;  Surgeon: Faiza Martinez MD;  Location: [facility] OR    INJECT EPIDURAL CERVICAL Bilateral 3/30/2023    Procedure: INJECTION, SPINE, CERVICAL C7-T1, EPIDURAL;  Surgeon: Faiza Martinez MD;  Location: [facility] OR    INJECT EPIDURAL LUMBAR Left 6/8/2023    Procedure: INJECTION, SPINE, LUMBAR, EPIDURAL;  Surgeon: Faiza Martinez MD;  Location: [facility] OR    INJECT JOINT SACROILIAC Bilateral 6/8/2023    Procedure: INJECT JOINT SACROILIAC (bilateral);  Surgeon: Faiza Martinez MD;  Location: [facility] OR    LAPAROSCOPIC HERNIORRHAPHY HIATAL N/A 2/10/2020    Procedure: LAPAROSCOPIC HIATAL HERNIA REPAIR, NISSEN FUNDOPLICATION, ESOPHOGASTRODUODENOSCOPY, PEG TUBE PLACEMENT.;  Surgeon: Alana aMck MD;  [facility]    NASAL/SINUS  POLYPECTOMY  2017    Polyps were benign    NOSE SURGERY  2007    deviated septum    TONSILLECTOMY & ADENOIDECTOMY  1981    TUBAL LIGATION  07/01/2011     Current Outpatient Medications   Medication    acetaminophen (TYLENOL) 500 MG tablet    atorvastatin (LIPITOR) 20 MG tablet    butalbital-acetaminophen-caffeine (ESGIC) -40 MG tablet    cetirizine (ZYRTEC) 10 MG tablet    co-enzyme Q-10 100 MG CAPS capsule    doxepin (SINEQUAN) 10 MG capsule    DULoxetine (CYMBALTA) 30 MG capsule    EPINEPHrine (EPIPEN/ADRENACLICK/OR ANY BX GENERIC EQUIV) 0.3 MG/0.3ML injection 2-pack    ferrous fumarate 65 mg, [place]. FE,-Vitamin C 125 mg (VITRON-C)  MG TABS tablet    fluticasone (FLONASE) 50 MCG/ACT nasal spray    HYDROmorphone (DILAUDID) 2 MG tablet    hydrOXYzine (ATARAX) 25 MG tablet    ivabradine (CORLANOR) 7.5 MG tablet    Lidocaine (LIDOCARE) 4 % Patch    medical cannabis (Patient's own supply.  Not a prescription)    metoclopramide (REGLAN) 10 MG tablet    Multiple Vitamins-Minerals (MULTIVITAMIN PO)    naproxen sodium (ANAPROX) 220 MG tablet    Probiotic Product (PROBIOTIC DAILY PO)    propranolol ER (INDERAL LA) 120 MG 24 hr capsule    Rhubarb (ESTROVEN COMPLETE) 4 MG TABS    rizatriptan (MAXALT-MLT) 5 MG ODT    Semaglutide-Weight Management (WEGOVY) 0.25 MG/0.5ML pen    SUMAtriptan (IMITREX STATDOSE) 6 MG/0.5ML pen injector kit    tiZANidine (ZANAFLEX) 4 MG tablet    traMADol (ULTRAM) 50 MG tablet    vitamin B complex with vitamin C (STRESS TAB) tablet    VITAMIN D, CHOLECALCIFEROL, PO    buprenorphine (BUTRANS) 10 MCG/HR WK patch    naloxone (NARCAN) 4 MG/0.1ML nasal spray     Current Facility-Administered Medications   Medication    Botulinum Toxin Type A (BOTOX) 200 units injection 155 Units     She is in no acute distress.  She is however visibly upset with her daily pain.  She has no weakness or numbness.  Her gait and balance are normal.      I have personally reviewed the lumbar spine MRI scan and  x-rays which show evidence of a previous fusion from L4-S1 but the instrumentations have been removed except for the interbody cages.  There is evidence of bilateral facet effusion at L3-4 and also some at right side of L2-3.     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  We had a long discussion regarding the possible cause of her back pain in the setting of her Erler's Danlos syndrome.  It could be that her low back pain is coming from the L3-4 facets which is starting to show some effusion; however because of her connective tissue disease, any extension of her fusion must be carefully weighed and considered.  Any extension of posterior lumbar fusion may potentially lead to problems at the adjacent levels in the future.  Currently the MRI scan only shows some facet effusions but no significant disc degeneration.  Therefore I did not recommend surgical intervention.    We should entertain median branch nerve block or facet injection at L3-4 and L2-3.  It is difficult to explain her lower extremity pain based on the MRI scan as there is no evidence of nerve root compression.  It is possible that she could be having severe radiculopathy from sacroiliitis.  Her previous SIJ joint injection did help her with SI joint pain.  She may require another course of SI joint injection and if this injection does help with radiculopathy then she may require SI joint fusion in the future.  She will consult Dr. Brown for further injections in the L3-4 and L2-3 facets as well as the SI joints.    Shane Burks MD

## 2023-06-16 NOTE — NURSING NOTE
Chief Complaint   Patient presents with     MS     RECHECK     New lumbar spine      Vitals were taken and medications were reconciled.   Zach Meyers, EMT  11:37 AM

## 2023-06-16 NOTE — PROGRESS NOTES
Date: 6/16/2023    Ada Welch is a 52-year-old female with a history of Erler's Danlos syndrome who previously had L4-S1 instrumented fusion in 2013 in [facility] which apparently became loosened and she had to have a revision surgery by Dr. Lucero.  She had a long arduous recovery for about 5 years but eventually she came to a point where she was finally doing relatively well until couple months ago when she began to have recurrent pain in her low back with intermittent pain radiating down into both lower extremities not in a specific dermatomal distribution.  Pain has been quite severe and has significantly limited her daily activities.  She was recently seen by Dr. Enciso who recommended nonoperative treatment including injections and medial branch nerve block.  L3-4 fusion extension was discussed as a possibility.  She recently had L3-4 interlaminar epidural injection by Dr. Martinez which did not give her any significant relief.    In the past, she was also seen Dr. Moncada for left upper extremity radicular pain but it was felt that that she did not have a surgical lesion.  She was referred to a cervical medial branch nerve block which gave her about 50% relief.    She also has a history of significant autonomic dysfunction.  Since the 2014 she has had fluctuating heart rates ranging from 40 to 150 bpm.  She has been followed by Dr. Russell.    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     Cervicalgia      Chronic sinusitis 00/2007    Diagnosed with chronic pansinusitis 2016     Coagulation disorder (H)     factor 8     CSF leak     Chiari malformation     Depression      Tom-Danlos disease      Eosinophilic  esophagitis 08/2018     Gastroesophageal reflux disease 3/1/2017    I have large hiatal hernia     Hiatal hernia 2016    Have adeline hiatel hernia     Hoarseness Unsure    It comes and goes     Hypertension      Hypothyroid      IBS (irritable bowel syndrome) with constipation     improved on Linzess     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     Irregular heart beat      Migraines 1/1/2007    Unsure of exact date     Nasal polyps 2013    Were revoved 03/2017, benign     Orthostatic hypotension      Other nervous system complications 1/2014    Autonomic nervous system disorder, neuralgia, neuropathy     Swelling, mass, or lump in head and neck 08/2016    Lymph node has been growing for last year     Thyroid disease 01/01/2008     Urinary incontinence      Urinary urgency      Past Surgical History:   Procedure Laterality Date     AS REPAIR OF NASAL SEPTUM  2009    repair broke nose     BACK SURGERY  12/09/2013  10/01/2014    Spinal fusion L4-S1, L5 moving 5/8ths in. Remove screws/rods     BIOPSY  01/01/2008 & 3/2017    mole biopsy, lymph node biopsy     COLONOSCOPY  3/2017    Colonoscopy and GI     ESOPHAGEAL IMPEDENCE FUNCTION TEST WITH 24 HOUR PH GREATER THAN 1 HOUR N/A 9/17/2019    Procedure: IMPEDANCE PH STUDY, ESOPHAGUS, 24 HOUR;  Surgeon: Raghavendra Grande MD;  Location:  GI     ESOPHAGOSCOPY, GASTROSCOPY, DUODENOSCOPY (EGD), COMBINED N/A 8/3/2018    Procedure: COMBINED ESOPHAGOSCOPY, GASTROSCOPY, DUODENOSCOPY (EGD), BIOPSY SINGLE OR MULTIPLE;;  Surgeon: Juan Woodson MD;  [facility]     ESOPHAGOSCOPY, GASTROSCOPY, DUODENOSCOPY (EGD), COMBINED N/A 10/22/2018    Procedure: COMBINED ESOPHAGOSCOPY, GASTROSCOPY, DUODENOSCOPY (EGD), BIOPSY SINGLE OR MULTIPLE;  Surgeon: Sadia Carolina MD;  Location: [facility] GI     ESOPHAGOSCOPY, GASTROSCOPY, DUODENOSCOPY (EGD), COMBINED N/A 12/17/2018    Procedure: COMBINED ESOPHAGOSCOPY, GASTROSCOPY, DUODENOSCOPY (EGD);  Surgeon: Chintan  MD Juan;  Location: [facility] GI     ESOPHAGOSCOPY, GASTROSCOPY, DUODENOSCOPY (EGD), COMBINED N/A 10/14/2022    Procedure: ESOPHAGOGASTRODUODENOSCOPY, WITH BIOPSY;  Surgeon: Jay Shaffer MD;  Location: [facility] OR     INJECT BLOCK MEDIAL BRANCH CERVICAL/THORACIC/LUMBAR Left 9/24/2020    Procedure: BLOCK, NERVE, FACET JOINT, MEDIAL BRANCH, DIAGNOSTIC;  Surgeon: Faiza Martinez MD;  [facility]     INJECT BLOCK MEDIAL BRANCH CERVICAL/THORACIC/LUMBAR Bilateral 10/8/2020    Procedure: Bilateral cervical 3, cervical 4, and cervical 5 medial branch nerve block;  Surgeon: Faiza Martinez MD;  Location: [facility] OR     INJECT BLOCK MEDIAL BRANCH CERVICAL/THORACIC/LUMBAR Right 3/4/2021    Procedure: Cervical 3, cervical 4, cervical 5 medial branch nerve blocks to target C3-C4 and C4-C5 facet joint;  Surgeon: Faiza Martinez MD;  Location: [facility] OR     INJECT EPIDURAL CERVICAL N/A 10/27/2022    Procedure: INJECTION, SPINE, CERVICAL, EPIDURAL, C7-T1;  Surgeon: Faiza Martinez MD;  Location: [facility] OR     INJECT EPIDURAL CERVICAL Bilateral 3/30/2023    Procedure: INJECTION, SPINE, CERVICAL C7-T1, EPIDURAL;  Surgeon: Faiza Martinez MD;  Location: [facility] OR     INJECT EPIDURAL LUMBAR Left 6/8/2023    Procedure: INJECTION, SPINE, LUMBAR, EPIDURAL;  Surgeon: Faiza Martinez MD;  Location: [facility] OR     INJECT JOINT SACROILIAC Bilateral 6/8/2023    Procedure: INJECT JOINT SACROILIAC (bilateral);  Surgeon: Faiza Martinez MD;  Location: [facility] OR     LAPAROSCOPIC HERNIORRHAPHY HIATAL N/A 2/10/2020    Procedure: LAPAROSCOPIC HIATAL HERNIA REPAIR, NISSEN FUNDOPLICATION, ESOPHOGASTRODUODENOSCOPY, PEG TUBE PLACEMENT.;  Surgeon: Alana Mack MD;  [facility]     NASAL/SINUS POLYPECTOMY  2017    Polyps were benign     NOSE SURGERY  2007    deviated septum     TONSILLECTOMY & ADENOIDECTOMY  1981     TUBAL LIGATION  07/01/2011     Current Outpatient Medications   Medication     acetaminophen (TYLENOL) 500 MG tablet     atorvastatin (LIPITOR) 20 MG  tablet     butalbital-acetaminophen-caffeine (ESGIC) -40 MG tablet     cetirizine (ZYRTEC) 10 MG tablet     co-enzyme Q-10 100 MG CAPS capsule     doxepin (SINEQUAN) 10 MG capsule     DULoxetine (CYMBALTA) 30 MG capsule     EPINEPHrine (EPIPEN/ADRENACLICK/OR ANY BX GENERIC EQUIV) 0.3 MG/0.3ML injection 2-pack     ferrous fumarate 65 mg, [place]. FE,-Vitamin C 125 mg (VITRON-C)  MG TABS tablet     fluticasone (FLONASE) 50 MCG/ACT nasal spray     HYDROmorphone (DILAUDID) 2 MG tablet     hydrOXYzine (ATARAX) 25 MG tablet     ivabradine (CORLANOR) 7.5 MG tablet     Lidocaine (LIDOCARE) 4 % Patch     medical cannabis (Patient's own supply.  Not a prescription)     metoclopramide (REGLAN) 10 MG tablet     Multiple Vitamins-Minerals (MULTIVITAMIN PO)     naproxen sodium (ANAPROX) 220 MG tablet     Probiotic Product (PROBIOTIC DAILY PO)     propranolol ER (INDERAL LA) 120 MG 24 hr capsule     Rhubarb (ESTROVEN COMPLETE) 4 MG TABS     rizatriptan (MAXALT-MLT) 5 MG ODT     Semaglutide-Weight Management (WEGOVY) 0.25 MG/0.5ML pen     SUMAtriptan (IMITREX STATDOSE) 6 MG/0.5ML pen injector kit     tiZANidine (ZANAFLEX) 4 MG tablet     traMADol (ULTRAM) 50 MG tablet     vitamin B complex with vitamin C (STRESS TAB) tablet     VITAMIN D, CHOLECALCIFEROL, PO     buprenorphine (BUTRANS) 10 MCG/HR WK patch     naloxone (NARCAN) 4 MG/0.1ML nasal spray     Current Facility-Administered Medications   Medication     Botulinum Toxin Type A (BOTOX) 200 units injection 155 Units     She is in no acute distress.  She is however visibly upset with her daily pain.  She has no weakness or numbness.  Her gait and balance are normal.      I have personally reviewed the lumbar spine MRI scan and x-rays which show evidence of a previous fusion from L4-S1 but the instrumentations have been removed except for the interbody cages.  There is evidence of bilateral facet effusion at L3-4 and also some at right side of L2-3.      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  We had a long discussion regarding the possible cause of her back pain in the setting of her Erler's Danlos syndrome.  It could be that her low back pain is coming from the L3-4 facets which is starting to show some effusion; however because of her connective tissue disease, any extension of her fusion must be carefully weighed and considered.  Any extension of posterior lumbar fusion may potentially lead to problems at the adjacent levels in the future.  Currently the MRI scan only shows some facet effusions but no significant disc degeneration.  Therefore I did not recommend surgical intervention.    We should entertain median branch nerve block or facet injection at L3-4 and L2-3.  It is difficult to explain her lower extremity pain based on the MRI scan as there is no evidence of nerve root compression.  It is possible that she could be having severe radiculopathy from sacroiliitis.  Her previous SIJ joint injection did help her with SI joint pain.  She may require another course of SI joint injection and if this injection does help with radiculopathy then she may require SI joint fusion in the future.  She will consult Dr. Brown for further injections in the L3-4 and L2-3 facets as well as the SI joints.    Shane Burks MD

## 2023-06-19 ENCOUNTER — TELEPHONE (OUTPATIENT)
Dept: INTERNAL MEDICINE | Facility: CLINIC | Age: 53
End: 2023-06-19

## 2023-06-19 NOTE — TELEPHONE ENCOUNTER
Central Prior Authorization Team   Phone: [phone]    PA Initiation    Medication: Semaglutide-Weight Management (WEGOVY) 0.25 MG/0.5ML pen  Insurance Company: [employer] - Phone [phone] Fax [phone]  Pharmacy Filling the Rx: [facility] #[id number] [address]  Filling Pharmacy Phone: [phone]  Filling Pharmacy Fax:    Start Date: 6/19/2023

## 2023-06-19 NOTE — TELEPHONE ENCOUNTER
Prior Authorization Approval        Authorization Effective Date: 5/20/2023  Authorization Expiration Date: 1/15/2024  Medication: Semaglutide-Weight Management (WEGOVY) 0.25 MG/0.5ML pen-PA APPROVED   Approved Dose/Quantity:  Reference #:     Insurance Company: [employer] - Phone [phone] Fax [phone]  Expected CoPay:       CoPay Card Available:      Foundation Assistance Needed:    Which Pharmacy is filling the prescription (Not needed for infusion/clinic administered): [facility] #[id number] - [address] AT [facility] [address]  Pharmacy Notified: Yes- **Instructed pharmacy to notify patient when script is ready to /ship.**-Pharmacy stated that they have a paid claim on medication through patients insurance; however, pharmacy stated that medication is on back order and no release date was provided/given. Requested Olinda at filling pharmacy to contact patient on medication PA Approval and that medication on back order.   Patient Notified: Yes

## 2023-06-19 NOTE — TELEPHONE ENCOUNTER
Prior Authorization Retail Medication Request    Medication/Dose: WEGOVY 0.25 MG/0.5ML pen  ICD code (if different than what is on RX):

## 2023-06-20 ENCOUNTER — MYC MEDICAL ADVICE (OUTPATIENT)
Dept: CARE COORDINATION | Facility: CLINIC | Age: 53
End: 2023-06-20

## 2023-06-20 NOTE — TELEPHONE ENCOUNTER
Pt notified of approval via [employer]. message.     TYSHAWN RUSSO RN on 6/20/2023 at 1:41 PM

## 2023-06-21 NOTE — TELEPHONE ENCOUNTER
Pt requesting that medication be sent to [facility] vs [facility] due to supply issues.

## 2023-06-22 ENCOUNTER — TELEPHONE (OUTPATIENT)
Dept: ANESTHESIOLOGY | Facility: CLINIC | Age: 53
End: 2023-06-22

## 2023-06-22 NOTE — TELEPHONE ENCOUNTER
Prior Authorization Retail Medication Request    Medication/Dose: buprenorphine (BUTRANS) 10 MCG/HR WK patch- Place 1 patch onto the skin every 7 days  ICD code (if different than what is on RX):    Previously Tried and Failed:    Rationale:      Insurance Name:    Insurance ID:        Pharmacy Information (if different than what is on RX)  Name:    Phone:

## 2023-06-22 NOTE — TELEPHONE ENCOUNTER
Central Prior Authorization Team   Phone: [phone]      PA Initiation    Medication: WEGOVY 0.25 MG/0.5ML SC SOAJ  Insurance Company: [employer] Part D - Phone [phone] Fax [phone]  Pharmacy Filling the Rx: [id number] [place] [address] [facility] [address]  Filling Pharmacy Phone: [phone]  Filling Pharmacy Fax:    Start Date: 6/22/2023

## 2023-06-22 NOTE — TELEPHONE ENCOUNTER
Central Prior Authorization Team   Phone: [phone]      PA Initiation    Medication: BUPRENORPHINE 10 MCG/HR TD PTWK  Insurance Company: [employer] Part D - Phone [phone] Fax [phone]  Pharmacy Filling the Rx: [facility][id number] [address]  Filling Pharmacy Phone: [phone]  Filling Pharmacy Fax:    Start Date: 6/22/2023

## 2023-06-23 NOTE — TELEPHONE ENCOUNTER
PRIOR AUTHORIZATION DENIED - through Medicare Part D [employer].   Patient also has [employer] that I can try to submit P/A to. Will start a separate telephone encounter for that P/A for documentation purposes.    Medication: WEGOVY 0.25 MG/0.5ML SC SOAJ  Insurance Company: [employer] Part D - Phone [phone] Fax [phone]  Denial Date: 6/22/2023  Denial Rational:      Appeal Information:

## 2023-06-26 ENCOUNTER — TELEPHONE (OUTPATIENT)
Dept: ANESTHESIOLOGY | Facility: CLINIC | Age: 53
End: 2023-06-26
Payer: COMMERCIAL

## 2023-06-26 NOTE — TELEPHONE ENCOUNTER
[facility] Call Center    Phone Message    May a detailed message be left on voicemail: yes     Reason for Call: Other: Patient states she spoke with her pharmacy [facility] #[id number] - [place], [place] - [address] AT [facility] [address] and was told they have not received the PA. Patient is requesting her pharmacy be changed back to [facility] in [place], [place]. She states she has had to many issues with V[facility]. Patient states she is wearing her old patch due to being out of medication. Please review.     Action Taken: Message routed to:  [facility] Pain    Travel Screening: Not Applicable                                                                       Clothing

## 2023-06-26 NOTE — TELEPHONE ENCOUNTER
[facility] Call Center    Phone Message    May a detailed message be left on voicemail: yes     Reason for Call: Medication Refill Request    Has the patient contacted the pharmacy for the refill? Yes   Name of medication being requested: traMADol (ULTRAM) 50 MG tablet  Provider who prescribed the medication: Faiza Martinez MD  Pharmacy: [address] [place], [place] [id number]  Date medication is needed: 1 week      Action Taken: Message routed to:  [facility] ([facility]): Pain    Travel Screening: Not Applicable

## 2023-06-26 NOTE — TELEPHONE ENCOUNTER
Per insurance, this doesn't require a Prior Auth.   I've just double checked by calling [facility] and verified this as well. Pharmacy should call their pharmacy help desk if having problems processing rx.    Medication: BUPRENORPHINE 10 MCG/HR TD PTWK  Insurance Company: [employer] Part D - Phone [phone] Fax [phone]  Expected CoPay:      Pharmacy Filling the Rx: [id number] [address] [facility] [address]  Pharmacy Notified:  No, patient is having rx go back and forth from one to another, so not sure where exactly has the rx currently to process it.   Patient Notified:

## 2023-06-27 NOTE — TELEPHONE ENCOUNTER
LPN followed up with both the patient and the Pharmacy.   [facility] does not have [facility] on file- they only have pt's [employer].   When they run the prescription under [employer] insurance it is still saying that the pt needs PA for their Butrans Prescriptions.     LPN spoke to pt to update them and asked which insurance this medication is supposed to be submitted. Pt confirmed [employer].     Pharmacy provided the following information.     [employer] Insurance:    Group #1    ID: [id number]    BIN: [id number]    LPN will resubmit encounter to PA team.

## 2023-06-27 NOTE — TELEPHONE ENCOUNTER
Will start a separate telephone encounter for P/A request to different insurance, [employer], for documenting purposes.

## 2023-06-27 NOTE — TELEPHONE ENCOUNTER
LPN called and spoke to the patient. Separate encounter opened and will be forwarded to covering provider.     Mya Cash LPN

## 2023-06-27 NOTE — TELEPHONE ENCOUNTER
Refill request    Medication: traMADol (ULTRAM) 50 MG tablet    Sig: Take 1 tablet (50 mg) by mouth every 6 hours as needed for severe pain    Last prescribed on 5/12 for 60 tablets with 1 refill. (2 month supply)     5/15/23 you refilled - #28 tablets  5/27/23 you refilled #60 tablets   6/12/23 you refilled #28 tablets     Last clinic appointment: 5/25/23  Next clinic appointment: 8/10/23    Last Drug Screen Collected: 5/25/23  Controlled Substance Agreement signed: 5/25/23      Preferred pharmacy:    [facility] [employer] in [place], [place] on [address]    LPN will pend prescription and review with the covering provider.     Mya Cash LPN

## 2023-06-28 RX ORDER — TRAMADOL HYDROCHLORIDE 50 MG/1
50 TABLET ORAL EVERY 6 HOURS PRN
Qty: 60 TABLET | Refills: 0 | Status: SHIPPED | OUTPATIENT
Start: 2023-06-28 | End: 2023-07-25

## 2023-06-28 NOTE — TELEPHONE ENCOUNTER
LPN called and informed the pt that their medication had been signed and routed to the pharmacy.     Mya Cash LPN

## 2023-07-11 ENCOUNTER — VIRTUAL VISIT (OUTPATIENT)
Dept: INTERNAL MEDICINE | Facility: CLINIC | Age: 53
End: 2023-07-11
Payer: COMMERCIAL

## 2023-07-11 ENCOUNTER — OFFICE VISIT (OUTPATIENT)
Dept: NEUROLOGY | Facility: CLINIC | Age: 53
End: 2023-07-11
Payer: COMMERCIAL

## 2023-07-11 VITALS — BODY MASS INDEX: 32.03 KG/M2 | WEIGHT: 206 LBS

## 2023-07-11 DIAGNOSIS — Z59.71 INSURANCE COVERAGE PROBLEMS: ICD-10-CM

## 2023-07-11 DIAGNOSIS — Q79.60 EDS (EHLERS-DANLOS SYNDROME): Primary | ICD-10-CM

## 2023-07-11 DIAGNOSIS — M25.512 CHRONIC PAIN OF BOTH SHOULDERS: ICD-10-CM

## 2023-07-11 DIAGNOSIS — F41.9 ANXIETY: ICD-10-CM

## 2023-07-11 DIAGNOSIS — M25.511 CHRONIC PAIN OF BOTH SHOULDERS: ICD-10-CM

## 2023-07-11 DIAGNOSIS — G43.719 INTRACTABLE CHRONIC MIGRAINE WITHOUT AURA AND WITHOUT STATUS MIGRAINOSUS: Primary | ICD-10-CM

## 2023-07-11 DIAGNOSIS — G89.29 CHRONIC PAIN OF BOTH SHOULDERS: ICD-10-CM

## 2023-07-11 DIAGNOSIS — M19.90 ARTHRITIS: ICD-10-CM

## 2023-07-11 PROCEDURE — 99215 OFFICE O/P EST HI 40 MIN: CPT | Mod: VID | Performed by: INTERNAL MEDICINE

## 2023-07-11 PROCEDURE — 64615 CHEMODENERV MUSC MIGRAINE: CPT | Performed by: PSYCHIATRY & NEUROLOGY

## 2023-07-11 RX ORDER — RIZATRIPTAN BENZOATE 5 MG/1
TABLET, ORALLY DISINTEGRATING ORAL
Qty: 18 TABLET | Refills: 11 | Status: SHIPPED | OUTPATIENT
Start: 2023-07-11 | End: 2024-02-17

## 2023-07-11 RX ORDER — CEFUROXIME AXETIL 250 MG/1
TABLET ORAL
Qty: 2 KIT | Refills: 11 | Status: SHIPPED | OUTPATIENT
Start: 2023-07-11 | End: 2024-02-17

## 2023-07-11 RX ORDER — HYDROXYZINE HYDROCHLORIDE 25 MG/1
25 TABLET, FILM COATED ORAL EVERY 6 HOURS PRN
Qty: 180 TABLET | Refills: 1 | Status: SHIPPED | OUTPATIENT
Start: 2023-07-11 | End: 2023-12-15

## 2023-07-11 RX ORDER — GUANFACINE 1 MG/1
TABLET ORAL
COMMUNITY
Start: 2023-06-21

## 2023-07-11 ASSESSMENT — HEADACHE IMPACT TEST (HIT 6)
HIT6 TOTAL SCORE: 65
WHEN YOU HAVE A HEADACHE HOW OFTEN DO YOU WISH YOU COULD LIE DOWN: ALWAYS
WHEN YOU HAVE HEADACHES HOW OFTEN IS THE PAIN SEVERE: VERY OFTEN
HOW OFTEN HAVE YOU FELT TOO TIRED TO WORK BECAUSE OF YOUR HEADACHES: VERY OFTEN
HOW OFTEN DID HEADACHS LIMIT CONCENTRATION ON WORK OR DAILY ACTIVITY: SOMETIMES
HOW OFTEN DO HEADACHES LIMIT YOUR DAILY ACTIVITIES: SOMETIMES
HOW OFTEN HAVE YOU FELT FED UP OR IRRITATED BECAUSE OF YOUR HEADACHES: SOMETIMES

## 2023-07-11 ASSESSMENT — PAIN SCALES - GENERAL: PAINLEVEL: MODERATE PAIN (5)

## 2023-07-11 NOTE — PATIENT INSTRUCTIONS
For musculoskeletal symptoms, can see Sports Medicine and/or PT.  For Sports Medicine, I recommend Mya Carrion, José Miguel Barnes, or Leo Joshua at [facility].    Can try [facility] in [place]

## 2023-07-11 NOTE — NURSING NOTE
Is the patient currently in the state of [place]? YES    Visit mode:VIDEO    If the visit is dropped, the patient can be reconnected by: VIDEO VISIT: Text to cell phone: [phone]    Will anyone else be joining the visit? NO      How would you like to obtain your AVS? MyChart    Are changes needed to the allergy or medication list? Yes-PT is now also taking Guanfacine 1mg at night.    Patient denies any changes since echeck-in regarding medication and allergies and states all information entered during echeck-in remains accurate.     Reason for visit: Recheck -follow up 4 weeks    Isidro Beltran

## 2023-07-11 NOTE — PROGRESS NOTES
Ada Welch is a 52 year old female who is being evaluated via a billable video visit for the following health issues:    Chief Complaint   Patient presents with     RECHECK     Follow Up       HPI    Scarlett is here today to follow-up on weight, pain, mental health and sleep    We stopped amitriptyline given weight gain  Replaced with doxepin  Trying to get Wegovy approved through PAP  Going through some personal struggles lately  In the interim, she saw Dr. Burks in [facility] and [facility] for dysautonomia  She started seeing a therapist for anxiety, wondering about other anxiety drugs  Seeing Berna at [facility]    Past Documentation:  She is having a pain flare lately, got an SUNSHINE recently 6/8, still having a lot of pain, using tizanidine and tramadol, she got a short term rx for dilaudid, using cannabis as well  Considering repeat fusion        Detailed Medical History:  -EDS-Type 3 Hypermobility. 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 He reports having a Beighton score of 7-8 out of 9. She reports having genetic testing done, which was questionable for the MYLK Gene. She previously followed with a rheumatologist who is an EDS specialist in [place].  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 Russell, has ILR. Ivadrabine and propronolol helping.  -Cervical instability, chiari malformation:  Saw Dr. Moncada, cervical fusion not recommended, considering occipital blocks. Also traveled to [facility] to see Dr. Andrey Sofia.  -Chronic headaches: Daily tension, cervicogenic, migraine, intracranial hypotension. Has not responded well to injections. Hospitalized in 2017 with normal LP and MRI. At that time responded to DHA.  Had blood patch done 3/19 with good relief of pain. Previously failed amovig and emgality. Blood patch test repeated 9/19. Now managing with medical management, Uses fioricet,maxalt, aleve, tizanidine. Botox.  -Hiatal hernia: Recently saw Dr. Mack and had manometry testing done  in Feb 2019 which revealed lower esoph pressure, hiatal hernia, possibly short esophagus, peristalsis was preserved. S/p Nissen surgery 2/20.  -Eosinophilic esophagitis: EGD 12/18 performed for food impaction showed ring at GEJ and mucosal changes c/w EoE. Biopsies 8/18 positive for Eos.  -Lumbar pain: She underwent L4 to S1 fusion in 2013 followed by removal of instrumentation, which did not improve her symptoms. She met with Dr. Martinez in the pain clinic and was taken off high doses of Dilaudid and started on Suboxone in 2017, which dramatically improved her symptoms.  She continues to work with physical therapy twice a week as well as other complementary therapies.   -Cervical pain, no surgical targets per neurosurgery  -IBS, constipation: GES showed rapid emptying 10/17. XR video swallow showed no aspiration 10/17.  -Asthma  -Enlarged cervical lymph nodes: L level 2 node biopsied 10/18 with scant cellularity. Biopsied in past and benign with neg flow cytometry.  -Urinary urge incontinence/incomplete emptying  -Pelvic floor dysfunction, rectocele, [facility]          -Chronic Pain: Has regimen of medical cannabis, tramadol,  butran, cymbalta and tizanidine. Uses medical cannabis  since 2018 which helps. Follows with Dr. Martinez.       Routine Health Maintenence:  Depression Screening:   PHQ-2 Score:         2/23/2023     8:24 AM 7/14/2022     9:12 AM   PHQ-2 ( 1999 Pfizer)   Q1: Little interest or pleasure in doing things 0 0   Q2: Feeling down, depressed or hopeless 0 0   PHQ-2 Score 0 0        Immunizations (zoster, pneumovax, flu, Tdap, Hep A/B):   There is no immunization history on file for this patient.  Lipids:   Recent Labs   Lab Test 04/26/18  1010   CHOL 244*   HDL 48*   *   TRIG 156*       Lung Ca Screening (>30 pk age 55-79 or >20 py age 50-79 + RF): n/a low pack years  Colonoscopy (50-75 yrs): 3/17 , due 2027   - One small hyperplastic polyp in the sigmoid colon, removed with a cold  "snare. Resected and retrieved. Diverticulosis in the sigmoid colon. Repeat colonoscopy in10 years for surveillance   Dexa (>65W or 70M yrs): n/a  Mammogram (40-75 yrs): 1/18 ordered, 6/22  Pap (21-65 yrs): 1/18 HPV neg, due 1/23  HIV/HCV if risk factors:  HIV neg 4/18  Tob/EtOH: screen neg  Lifestyle factors: reviewed  Advanced Directive: deferred                                Review of Systems   A detailed ROS was performed and was negative unless indicated in the HPI above.        Physical Exam   There were no vitals taken for this visit.  Wt Readings from Last 2 Encounters:   07/11/23 93.4 kg (206 lb)   06/16/23 92.3 kg (203 lb 6.4 oz)      Ht Readings from Last 2 Encounters:   06/16/23 1.708 m (5' 7.24\")   06/08/23 1.702 m (5' 7\")     GENERAL: healthy, alert and no distress  HEAD: Normocephalic, atraumatic  EYES: Eyes grossly normal to inspection, EOMI and conjunctivae and sclerae normal  RESP: Speaking in full sentences, unlabored, no audible wheezes or cough  SKIN: no suspicious lesions or rashes, no jaundice  NEURO: oriented, and speech normal  PSYCH: mentation appears normal, affect normal/bright          Diagnostic Test Results:  Labs reviewed in Epic            Assessment and Plan:  Ada was seen today for recheck and follow up.    Diagnoses and all orders for this visit:    EDS (Tom-Danlos syndrome)  -     Adult Rheumatology  Referral; Future  -     Orthopedic  Referral; Future  -     Physical Therapy Referral; Future--would benefit from longitudinal PT with CTD specialists    Anxiety  -     hydrOXYzine (ATARAX) 25 MG tablet; Take 1 tablet (25 mg) by mouth every 6 hours as needed for anxiety    Insurance coverage problems  -     Social Work Referral Specific Sites Only - See Locations in Order; Future    Chronic pain of both shoulders  -     Orthopedic  Referral; Future--would benefit from ongoing sports med continuing cares for ms A Fourth [employer]    Arthritis  -     Adult " Rheumatology  Referral; Future--will refer out given EDS diagnosis, [facility]    Weight  She will start Wegovy soon and f/u in 6 weeks      Video-Visit Details    Type of service:  Video Visit    Video Start/End Time: 8:20 AM 8:55 AM    Originating Location (pt. Location): Home    Distant Location (provider location):  OFF SITE    Mode of Communication:  Video Conference via  [employer] or  Suresh Rivera MD  Internal Medicine      >40 minutes spent today performing chart review, history and exam, documentation and further activities as noted above.

## 2023-07-11 NOTE — LETTER
7/11/2023       RE: Ada Welch  [address] [address]       Dear Colleague,    Thank you for referring your patient, Ada Welch, to the [facility] [place] at [facility]. Please see a copy of my visit note below.          Physicians     Ada Welch MRN# [id number]   Age: 51 year old YOB: 1970      Botulinum Toxin Procedure Note     The procedure was explained to the patient. Benefits of the treatment were discussed including headache and migraine reduction. Risks of the procedure were reviewed including but not limited to pain, bruising, bleeding, infection, weakness of muscles injected or those distal to injection. The patient voiced understanding of the risks and benefits. All questions answered and the patient consented to proceed.      Prior to receiving Botox injections the patient reported the following:  Baseline headache frequency: > 15 days/month  Baseline headache duration: > 4 hours  Associated migrainous features: wavy vision     Previous treatment trials: amitriptyline, zonisamide, gabapentin, Aimovig, Emgality, duloxetine, tizanidine     Last Botox procedure: 3/28/23  Current migraine frequency: Helps headaches. Often has other migraine symptoms like brain fog, pulsing vision . Wore off about 2-3 weeks ago we are delayed on this injection. Pain in head back of head is helping.  She would like to try a small splenius capitus injection     A 200 unit vial of Botox was diluted with 4ml of 0.9% sodium chloride     Botox lot # and expiration date: See MAR for details  0.9% sodium chloride lot # and expiration date: See MAR for details     The following muscles were injected using a 30 gauge needle:  Procerus 1 site 5 units   2 sites (bilat) 10 units  Frontalis 4 sites (bilat) 20 units  Temporalis 8 sites (bilat) 40 units  Trapezius muscles 6 sites (bilat) 30 units  Occipitalis  6 sites (bilat) 30 units  Splenius capitus 2 sites (bilat) 10 units (lateral)  Masseter 2 sites (bilat) 10 units     A total of 155 units were injected, 45 units wasted.   The patient tolerated the injections well. I was present for and performed the entire procedure.            Again, thank you for allowing me to participate in the care of your patient.      Sincerely,    Noemy Nguyen MD

## 2023-07-11 NOTE — PROGRESS NOTES
BRYCE Bowdengeovanna Welch MRN# [id number]   Age: 51 year old YOB: 1970      Botulinum Toxin Procedure Note     The procedure was explained to the patient. Benefits of the treatment were discussed including headache and migraine reduction. Risks of the procedure were reviewed including but not limited to pain, bruising, bleeding, infection, weakness of muscles injected or those distal to injection. The patient voiced understanding of the risks and benefits. All questions answered and the patient consented to proceed.      Prior to receiving Botox injections the patient reported the following:  Baseline headache frequency: > 15 days/month  Baseline headache duration: > 4 hours  Associated migrainous features: wavy vision     Previous treatment trials: amitriptyline, zonisamide, gabapentin, Aimovig, Emgality, duloxetine, tizanidine     Last Botox procedure: 3/28/23  Current migraine frequency: Helps headaches. Often has other migraine symptoms like brain fog, pulsing vision . Wore off about 2-3 weeks ago we are delayed on this injection. Pain in head back of head is helping.  She would like to try a small splenius capitus injection     A 200 unit vial of Botox was diluted with 4ml of 0.9% sodium chloride     Botox lot # and expiration date: See MAR for details  0.9% sodium chloride lot # and expiration date: See MAR for details     The following muscles were injected using a 30 gauge needle:  Procerus 1 site 5 units   2 sites (bilat) 10 units  Frontalis 4 sites (bilat) 20 units  Temporalis 8 sites (bilat) 40 units  Trapezius muscles 6 sites (bilat) 30 units  Occipitalis 6 sites (bilat) 30 units  Splenius capitus 2 sites (bilat) 10 units (lateral)  Masseter 2 sites (bilat) 10 units     A total of 155 units were injected, 45 units wasted.   The patient tolerated the injections well. I was present for and performed the entire procedure.      Noemy Nguyen MD

## 2023-07-18 ENCOUNTER — OFFICE VISIT (OUTPATIENT)
Dept: CARDIOLOGY | Facility: CLINIC | Age: 53
End: 2023-07-18
Attending: INTERNAL MEDICINE
Payer: COMMERCIAL

## 2023-07-18 VITALS
SYSTOLIC BLOOD PRESSURE: 111 MMHG | OXYGEN SATURATION: 97 % | HEIGHT: 67 IN | HEART RATE: 53 BPM | DIASTOLIC BLOOD PRESSURE: 82 MMHG | WEIGHT: 206.5 LBS | BODY MASS INDEX: 32.41 KG/M2

## 2023-07-18 DIAGNOSIS — R00.0 SINUS TACHYCARDIA: ICD-10-CM

## 2023-07-18 DIAGNOSIS — G90.A POSTURAL ORTHOSTATIC TACHYCARDIA SYNDROME: ICD-10-CM

## 2023-07-18 DIAGNOSIS — G90.9 AUTONOMIC DYSFUNCTION: Primary | ICD-10-CM

## 2023-07-18 DIAGNOSIS — Q79.60 EHLERS-DANLOS SYNDROME: ICD-10-CM

## 2023-07-18 PROCEDURE — 99214 OFFICE O/P EST MOD 30 MIN: CPT | Performed by: INTERNAL MEDICINE

## 2023-07-18 PROCEDURE — G0463 HOSPITAL OUTPT CLINIC VISIT: HCPCS | Performed by: INTERNAL MEDICINE

## 2023-07-18 ASSESSMENT — PAIN SCALES - GENERAL: PAINLEVEL: NO PAIN (0)

## 2023-07-18 NOTE — LETTER
7/18/2023      RE: Aad Welch  [address] [facility] [address]       Dear Colleague,    Thank you for the opportunity to participate in the care of your patient, Ada Welch, at the [facility] [place] at [facility]. Please see a copy of my visit note below.    HPI:   Ada is a 52-year-old woman with complex medical history including autonomic dysfunction which markedly impact her quality of life for many years.      She is doing much better now and has no new cardiovascular complaints.  Her primary ongoing problem is thermoregulatory in nature with tendency to feel hot and sweaty at inappropriate times.  Attempts to treat this by neurology at [facility] not entirely successful.    Patient's previous issues with heart rate variation seem to be less of a problem with current medical regimen.  We will maintain the same drug plan for her.    Ada has tried to maintain her electrolyte/salt intake.  I suggested that she might tolerate Vitassium more than salt tablets which she did not do well with.    At today's visit we discussed reassessing cardiac structure by echo and we will arrange for that to be done in the near future.      Absent any change in her overall clinical picture we will revisit in 1 years time.  This conference 6    PAST MEDICAL HISTORY:  Past Medical History:   Diagnosis Date    Allergic rhinitis 09/2007    Anxiety     Arthritis 11/01/2013    Found during search for cause of back pain    Autoimmune disease (H) 2016    Immunosuppresive    Autonomic dysfunction     Bleeding disorder (H) 2016    Bruise easily    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    Cervicalgia     Chronic sinusitis 00/2007    Diagnosed with chronic pansinusitis 2016    Coagulation disorder (H)     factor 8    CSF leak     Chiari malformation    Depression     Tom-Danlos disease     Eosinophilic esophagitis 08/2018     Gastroesophageal reflux disease 3/1/2017    I have large hiatal hernia    Hiatal hernia 2016    Have adeline hiatel hernia    Hoarseness Unsure    It comes and goes    Hypertension     Hypothyroid     IBS (irritable bowel syndrome) with constipation     improved on Linzess    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    Irregular heart beat     Migraines 1/1/2007    Unsure of exact date    Nasal polyps 2013    Were revoved 03/2017, benign    Orthostatic hypotension     Other nervous system complications 1/2014    Autonomic nervous system disorder, neuralgia, neuropathy    Swelling, mass, or lump in head and neck 08/2016    Lymph node has been growing for last year    Thyroid disease 01/01/2008    Urinary incontinence     Urinary urgency        CURRENT MEDICATIONS:  Current Outpatient Medications   Medication Sig Dispense Refill    acetaminophen (TYLENOL) 500 MG tablet Take 1,000 mg by mouth every 6 hours as needed for mild pain      atorvastatin (LIPITOR) 20 MG tablet Take 1 tablet (20 mg) by mouth daily 90 tablet 3    buprenorphine (BUTRANS) 10 MCG/HR WK patch Place 1 patch onto the skin every 7 days 4 patch 1    butalbital-acetaminophen-caffeine (ESGIC) -40 MG tablet Take 1 tablet by mouth every 6 hours as needed for headaches 10 tablet 0    cetirizine (ZYRTEC) 10 MG tablet Take 10 mg by mouth daily as needed       co-enzyme Q-10 100 MG CAPS capsule Take 100 mg by mouth daily      doxepin (SINEQUAN) 10 MG capsule Take 1 capsule (10 mg) by mouth At Bedtime 90 capsule 1    DULoxetine (CYMBALTA) 30 MG capsule TAKE 2 CAPSULES BY MOUTH IN THE AM AND 1 CAPSULES AT BEDTIME 270 capsule 1    EPINEPHrine (EPIPEN/ADRENACLICK/OR ANY BX GENERIC EQUIV) 0.3 MG/0.3ML injection 2-pack INJECT 0.3 MLS (0.3 MG) INTO THE MUSCLE AS NEEDED FOR ANAPHYLAXIS  1    ferrous fumarate 65 mg, [place]. FE,-Vitamin C 125 mg (VITRON-C)  MG TABS tablet Take 1 tablet by mouth every other day 60 tablet 4     fluticasone (FLONASE) 50 MCG/ACT nasal spray Spray 2 sprays into both nostrils daily Call clinic to schedule follow up appointment. 48 mL 11    guanFACINE (TENEX) 1 MG tablet TAKE ONE TAB BY MOUTH ONCE NIGHTLY AT BEDTIME      HYDROmorphone (DILAUDID) 2 MG tablet Take 1 tablet (2 mg) by mouth daily as needed for pain 10 tablet 0    hydrOXYzine (ATARAX) 25 MG tablet Take 1 tablet (25 mg) by mouth every 6 hours as needed for anxiety 180 tablet 1    ivabradine (CORLANOR) 7.5 MG tablet Take 1 tablet (7.5 mg) by mouth 2 times daily 180 tablet 3    Lidocaine (LIDOCARE) 4 % Patch Place 1-3 patches onto the skin every 24 hours To prevent lidocaine toxicity, patient should be patch free for 12 hrs daily. 20 patch 0    medical cannabis (Patient's own supply.  Not a prescription) Take 1 Dose by mouth See Admin Instructions Capsule formulation - uses as needed      metoclopramide (REGLAN) 10 MG tablet Take 1 tablet (10 mg) by mouth 4 times daily as needed (headache) 40 tablet 3    Multiple Vitamins-Minerals (MULTIVITAMIN PO) Take 1 tablet by mouth daily       naproxen sodium (ANAPROX) 220 MG tablet Take 440-660 mg by mouth daily as needed for moderate pain      Probiotic Product (PROBIOTIC DAILY PO) Take 2 packets by mouth every morning       propranolol ER (INDERAL LA) 120 MG 24 hr capsule Take 1 capsule (120 mg) by mouth three times a week 39 capsule 3    Rhubarb (ESTROVEN COMPLETE) 4 MG TABS Take 1 tablet by mouth      rizatriptan (MAXALT-MLT) 5 MG ODT TAKE 1-2 TABLETS (5-10 MG) BY MOUTH AT ONSET OF HEADACHE FOR MIGRAINE (MAX 30 MG IN 24 HOURS) 18 tablet 11    Semaglutide-Weight Management (WEGOVY) 0.25 MG/0.5ML pen Inject 0.25 mg Subcutaneous once a week 2 mL 3    SUMAtriptan (IMITREX STATDOSE) 6 MG/0.5ML pen injector kit Inject 0.5 ml (6 mg) subcutaneously at onset of migraine, May repeat in 1 hour , Max 12 mg/24 hrs 2 kit 11    tiZANidine (ZANAFLEX) 4 MG tablet Take 1 tablet (4 mg) by mouth 4 times daily as needed for  muscle spasms 120 tablet 1    traMADol (ULTRAM) 50 MG tablet Take 1 tablet (50 mg) by mouth every 6 hours as needed for severe pain 60 tablet 0    vitamin B complex with vitamin C (STRESS TAB) tablet Take 1 tablet by mouth daily      VITAMIN D, CHOLECALCIFEROL, PO Take 2,000 Units by mouth daily      naloxone (NARCAN) 4 MG/0.1ML nasal spray Spray 1 spray (4 mg) into one nostril alternating nostrils once as needed for opioid reversal every 2-3 minutes until assistance arrives (Patient not taking: Reported on 6/16/2023) 0.2 mL 0       PAST SURGICAL HISTORY:  Past Surgical History:   Procedure Laterality Date    AS REPAIR OF NASAL SEPTUM  2009    repair broke nose    BACK SURGERY  12/09/2013  10/01/2014    Spinal fusion L4-S1, L5 moving 5/8ths in. Remove screws/rods    BIOPSY  01/01/2008 & 3/2017    mole biopsy, lymph node biopsy    COLONOSCOPY  3/2017    Colonoscopy and GI    ESOPHAGEAL IMPEDENCE FUNCTION TEST WITH 24 HOUR PH GREATER THAN 1 HOUR N/A 9/17/2019    Procedure: IMPEDANCE PH STUDY, ESOPHAGUS, 24 HOUR;  Surgeon: Raghavendra Grande MD;  Location:  GI    ESOPHAGOSCOPY, GASTROSCOPY, DUODENOSCOPY (EGD), COMBINED N/A 8/3/2018    Procedure: COMBINED ESOPHAGOSCOPY, GASTROSCOPY, DUODENOSCOPY (EGD), BIOPSY SINGLE OR MULTIPLE;;  Surgeon: Juan Woodson MD;  Location:  GI    ESOPHAGOSCOPY, GASTROSCOPY, DUODENOSCOPY (EGD), COMBINED N/A 10/22/2018    Procedure: COMBINED ESOPHAGOSCOPY, GASTROSCOPY, DUODENOSCOPY (EGD), BIOPSY SINGLE OR MULTIPLE;  Surgeon: Sadia Carolina MD;  Location:  GI    ESOPHAGOSCOPY, GASTROSCOPY, DUODENOSCOPY (EGD), COMBINED N/A 12/17/2018    Procedure: COMBINED ESOPHAGOSCOPY, GASTROSCOPY, DUODENOSCOPY (EGD);  Surgeon: Juan Woodson MD;  Location:  GI    ESOPHAGOSCOPY, GASTROSCOPY, DUODENOSCOPY (EGD), COMBINED N/A 10/14/2022    Procedure: ESOPHAGOGASTRODUODENOSCOPY, WITH BIOPSY;  Surgeon: Jay Shaffer MD;  Location: [facility] OR    INJECT BLOCK MEDIAL BRANCH CERVICAL/THORACIC/LUMBAR Left  9/24/2020    Procedure: BLOCK, NERVE, FACET JOINT, MEDIAL BRANCH, DIAGNOSTIC;  Surgeon: Faiza Martinez MD;  [facility]: [facility]    INJECT BLOCK MEDIAL BRANCH CERVICAL/THORACIC/LUMBAR Bilateral 10/8/2020    Procedure: Bilateral cervical 3, cervical 4, and cervical 5 medial branch nerve block;  Surgeon: Faiza Martinez MD;  Location: [facility] OR    INJECT BLOCK MEDIAL BRANCH CERVICAL/THORACIC/LUMBAR Right 3/4/2021    Procedure: Cervical 3, cervical 4, cervical 5 medial branch nerve blocks to target C3-C4 and C4-C5 facet joint;  Surgeon: Faiza Martinez MD;  Location: [facility] OR    INJECT EPIDURAL CERVICAL N/A 10/27/2022    Procedure: INJECTION, SPINE, CERVICAL, EPIDURAL, C7-T1;  Surgeon: Faiza Martinez MD;  Location: [facility] OR    INJECT EPIDURAL CERVICAL Bilateral 3/30/2023    Procedure: INJECTION, SPINE, CERVICAL C7-T1, EPIDURAL;  Surgeon: Faiza Martinez MD;  Location: [facility] OR    INJECT EPIDURAL LUMBAR Left 6/8/2023    Procedure: INJECTION, SPINE, LUMBAR, EPIDURAL;  Surgeon: Faiza Martinez MD;  Location: [facility] OR    INJECT JOINT SACROILIAC Bilateral 6/8/2023    Procedure: INJECT JOINT SACROILIAC (bilateral);  Surgeon: Faiza Martinez MD;  Location: [facility] OR    LAPAROSCOPIC HERNIORRHAPHY HIATAL N/A 2/10/2020    Procedure: LAPAROSCOPIC HIATAL HERNIA REPAIR, NISSEN FUNDOPLICATION, ESOPHOGASTRODUODENOSCOPY, PEG TUBE PLACEMENT.;  Surgeon: Alana Mack MD;  Location: [facility] OR    NASAL/SINUS POLYPECTOMY  2017    Polyps were benign    NOSE SURGERY  2007    deviated septum    TONSILLECTOMY & ADENOIDECTOMY  1981    TUBAL LIGATION  07/01/2011       ALLERGIES:     Allergies   Allergen Reactions    Baclofen Other (See Comments)     Loss of muscle tone. Muscle weakness.    Bee Venom Shortness Of Breath, Palpitations, Anaphylaxis and Difficulty breathing     Patient does carry Epi-Pen    Chicken-Derived Products (Egg) Nausea and Diarrhea    Compazine [Prochlorperazine]      Extreme jittery    Food      Milk    Gabapentin      Dizzy    Gluten  "Meal GI Disturbance     Wheat bran and wheat    Pregabalin     Seasonal Allergies      Dust, mold    Topiramate      Pt does not remember reaction       FAMILY HISTORY:  Family History   Problem Relation Age of Onset    Connective Tissue Disorder Mother         eds    Connective Tissue Disorder Sister         eds    Cancer Father         Spinal tumor grew into spinal cord, went in brain    Migraines Father     Diabetes Maternal Grandmother         Elderly diabetes    Heart Disease Maternal Grandmother     Hypertension Maternal Grandmother     Diabetes Paternal Grandmother         Elderly diabetes    Diabetes Paternal Grandfather         Elderly diabetes    Asthma Sister         Pediatric Asthma    Migraines Sister     Asthma Son         Pediatric Asthma    Thyroid Disease Sister         ,    Migraines Sister     Migraines Sister     Breast Cancer No family hx of      SOCIAL HISTORY:  Social History     Tobacco Use    Smoking status: Former     Packs/day: 0.20     Years: 10.00     Pack years: 2.00     Types: Cigarettes     Start date: 1991     Quit date: 2013     Years since quittin.6    Smokeless tobacco: Never   Vaping Use    Vaping Use: Never used   Substance Use Topics    Alcohol use: Yes     Comment: once a month    Drug use: Yes     Types: Marijuana     Comment: medical marijuana       ROS:   Constitutional: No fever, chills, or sweats. Weight stable.   ENT: No visual disturbance, ear ache, epistaxis, sore throat.   Cardiovascular: As per HPI.   Respiratory: No cough, hemoptysis.    GI: No nausea, vomiting as of blood pressure  : No hematuria.   Integument: Negative.   Psychiatric: Negative.   Hematologic: No problems of shortness blood pressure no easy bleeding.  Neuro: Negative.   Endocrinology: No significant heat or cold intolerance   Musculoskeletal: No myalgia.    Exam:  /82 (BP Location: Right arm, Patient Position: Chair, Cuff Size: Adult Large)   Pulse 53   Ht 1.695 m (5' 6.73\")  "  Wt 93.7 kg (206 lb 8 oz)   LMP  (LMP Unknown)   SpO2 97%   BMI 32.60 kg/m    GENERAL APPEARANCE: healthy, alert and no distress  HEENT: no icterus,no central cyanosis  NECK Admitted worsening (blood pressure goes up , no asymmetry, masses, or scars, thyroid normal to palpation and no bruits, JVP not elevated  RESPIRATORY- no rales, rhonchi or wheezes, no use of accessory muscles, no retractions, respirations are unlabored, normal respiratory rate  CARDIOVASCULAR: regular rhythm, normal S1 with physiologic split S2, no S3 or S4 and no murmur, click or rub, precordium quiet with normal PMI.  ABDOMEN: soft, non tender, without hepatosplenomegaly, no masses palpable, bowel sounds normal, aorta not enlarged by palpation, no abdominal bruits I certainly do not have  NEURO: alert and oriented to person/place/time, normal speech, gait and affect  SKIN: no ecchymoses, no rashes    Labs:  CBC RESULTS:   Lab Results   Component Value Date    WBC 6.5 06/24/2022    WBC 6.2 11/13/2020    RBC 4.74 06/24/2022    RBC 4.42 11/13/2020    HGB 14.8 06/24/2022    HGB 12.1 11/13/2020    HCT 43.5 06/24/2022    HCT 38.3 11/13/2020    MCV 92 06/24/2022    MCV 87 11/13/2020    MCH 31.2 06/24/2022    MCH 27.4 11/13/2020    MCHC 34.0 06/24/2022    MCHC 31.6 11/13/2020    RDW 12.1 06/24/2022    RDW 14.6 11/13/2020     06/24/2022     11/13/2020       BMP RESULTS:  Lab Results   Component Value Date     02/17/2023     11/13/2020    POTASSIUM 4.8 02/17/2023    POTASSIUM 4.5 11/13/2020    CHLORIDE 102 02/17/2023    CHLORIDE 102 11/13/2020    CO2 29 02/17/2023    CO2 29 11/13/2020    ANIONGAP 10 02/17/2023    ANIONGAP 4 11/13/2020     (H) 02/17/2023    GLC 96 11/13/2020    BUN 11.9 02/17/2023    BUN 10 11/13/2020    CR 0.80 02/17/2023    CR 0.83 11/13/2020    GFRESTIMATED 89 02/17/2023    GFRESTIMATED 83 11/13/2020    GFRESTBLACK >90 11/13/2020    JUAN 10.0 02/17/2023    JUAN 8.6 11/13/2020        INR RESULTS:  Lab  Results   Component Value Date    INR 0.93 01/30/2020    INR 0.97 02/28/2019    INR 0.90 12/17/2018    INR 0.96 07/06/2018       Procedures:  PULMONARY FUNCTION TESTS:        No data to display                  ECHOCARDIOGRAM:   No results found for this or any previous visit (from the past 8760 hour(s)).      Assessment and Plan:     1.  Autonomic dysfunction under improved control with current strategy  - Improved thermoregulatory issues and fewer heart rate variability symptoms.    Total elapsed time today with chart review, clinic visit and documentation 30 minutes    I very much appreciated the opportunity to see and assess Ada Welch in the clinic today. Please do not hesitate to contact my office if you have any questions or concerns.      Kirk Russell MD  Cardiac Arrhythmia Service  [facility]  [phone]  CC  KIRK RUSSELL        Please do not hesitate to contact me if you have any questions/concerns.     Sincerely,     Kirk Russell MD

## 2023-07-18 NOTE — NURSING NOTE
Chief Complaint   Patient presents with     Follow Up     3 mo f/u for autonomic dysfunction, saw emperatriz in april  meds- propranolol 120, ivabradine 7.5, guanfacine, statin        Vitals were taken and medications reconciled.    Bishop Levin, EMT  4:29 PM

## 2023-07-18 NOTE — PROGRESS NOTES
HPI:   Ada is a 52-year-old woman with complex medical history including autonomic dysfunction which markedly impact her quality of life for many years.      She is doing much better now and has no new cardiovascular complaints.  Her primary ongoing problem is thermoregulatory in nature with tendency to feel hot and sweaty at inappropriate times.  Attempts to treat this by neurology at [facility] not entirely successful.    Patient's previous issues with heart rate variation seem to be less of a problem with current medical regimen.  We will maintain the same drug plan for her.    Ada has tried to maintain her electrolyte/salt intake.  I suggested that she might tolerate Vitassium more than salt tablets which she did not do well with.    At today's visit we discussed reassessing cardiac structure by echo and we will arrange for that to be done in the near future.      Absent any change in her overall clinical picture we will revisit in 1 years time.  This conference 6    PAST MEDICAL HISTORY:  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     Cervicalgia      Chronic sinusitis 00/2007    Diagnosed with chronic pansinusitis 2016     Coagulation disorder (H)     factor 8     CSF leak     Chiari malformation     Depression      Tom-Danlos disease      Eosinophilic esophagitis 08/2018     Gastroesophageal reflux disease 3/1/2017    I have large hiatal hernia     Hiatal hernia 2016    Have adeline hiatel hernia     Hoarseness Unsure    It comes and goes     Hypertension      Hypothyroid      IBS (irritable bowel syndrome) with constipation     improved on Linzess     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/2015    Common with  Tom Danlos Syndrome     Irregular heart beat      Migraines 1/1/2007    Unsure of exact date     Nasal polyps 2013    Were revoved 03/2017, benign     Orthostatic hypotension      Other nervous system complications 1/2014    Autonomic nervous system disorder, neuralgia, neuropathy     Swelling, mass, or lump in head and neck 08/2016    Lymph node has been growing for last year     Thyroid disease 01/01/2008     Urinary incontinence      Urinary urgency        CURRENT MEDICATIONS:  Current Outpatient Medications   Medication Sig Dispense Refill     acetaminophen (TYLENOL) 500 MG tablet Take 1,000 mg by mouth every 6 hours as needed for mild pain       atorvastatin (LIPITOR) 20 MG tablet Take 1 tablet (20 mg) by mouth daily 90 tablet 3     buprenorphine (BUTRANS) 10 MCG/HR WK patch Place 1 patch onto the skin every 7 days 4 patch 1     butalbital-acetaminophen-caffeine (ESGIC) -40 MG tablet Take 1 tablet by mouth every 6 hours as needed for headaches 10 tablet 0     cetirizine (ZYRTEC) 10 MG tablet Take 10 mg by mouth daily as needed        co-enzyme Q-10 100 MG CAPS capsule Take 100 mg by mouth daily       doxepin (SINEQUAN) 10 MG capsule Take 1 capsule (10 mg) by mouth At Bedtime 90 capsule 1     DULoxetine (CYMBALTA) 30 MG capsule TAKE 2 CAPSULES BY MOUTH IN THE AM AND 1 CAPSULES AT BEDTIME 270 capsule 1     EPINEPHrine (EPIPEN/ADRENACLICK/OR ANY BX GENERIC EQUIV) 0.3 MG/0.3ML injection 2-pack INJECT 0.3 MLS (0.3 MG) INTO THE MUSCLE AS NEEDED FOR ANAPHYLAXIS  1     ferrous fumarate 65 mg, [place]. FE,-Vitamin C 125 mg (VITRON-C)  MG TABS tablet Take 1 tablet by mouth every other day 60 tablet 4     fluticasone (FLONASE) 50 MCG/ACT nasal spray Spray 2 sprays into both nostrils daily Call clinic to schedule follow up appointment. 48 mL 11     guanFACINE (TENEX) 1 MG tablet TAKE ONE TAB BY MOUTH ONCE NIGHTLY AT BEDTIME       HYDROmorphone (DILAUDID) 2 MG tablet Take 1 tablet (2 mg) by mouth daily as needed  for pain 10 tablet 0     hydrOXYzine (ATARAX) 25 MG tablet Take 1 tablet (25 mg) by mouth every 6 hours as needed for anxiety 180 tablet 1     ivabradine (CORLANOR) 7.5 MG tablet Take 1 tablet (7.5 mg) by mouth 2 times daily 180 tablet 3     Lidocaine (LIDOCARE) 4 % Patch Place 1-3 patches onto the skin every 24 hours To prevent lidocaine toxicity, patient should be patch free for 12 hrs daily. 20 patch 0     medical cannabis (Patient's own supply.  Not a prescription) Take 1 Dose by mouth See Admin Instructions Capsule formulation - uses as needed       metoclopramide (REGLAN) 10 MG tablet Take 1 tablet (10 mg) by mouth 4 times daily as needed (headache) 40 tablet 3     Multiple Vitamins-Minerals (MULTIVITAMIN PO) Take 1 tablet by mouth daily        naproxen sodium (ANAPROX) 220 MG tablet Take 440-660 mg by mouth daily as needed for moderate pain       Probiotic Product (PROBIOTIC DAILY PO) Take 2 packets by mouth every morning        propranolol ER (INDERAL LA) 120 MG 24 hr capsule Take 1 capsule (120 mg) by mouth three times a week 39 capsule 3     Rhubarb (ESTROVEN COMPLETE) 4 MG TABS Take 1 tablet by mouth       rizatriptan (MAXALT-MLT) 5 MG ODT TAKE 1-2 TABLETS (5-10 MG) BY MOUTH AT ONSET OF HEADACHE FOR MIGRAINE (MAX 30 MG IN 24 HOURS) 18 tablet 11     Semaglutide-Weight Management (WEGOVY) 0.25 MG/0.5ML pen Inject 0.25 mg Subcutaneous once a week 2 mL 3     SUMAtriptan (IMITREX STATDOSE) 6 MG/0.5ML pen injector kit Inject 0.5 ml (6 mg) subcutaneously at onset of migraine, May repeat in 1 hour , Max 12 mg/24 hrs 2 kit 11     tiZANidine (ZANAFLEX) 4 MG tablet Take 1 tablet (4 mg) by mouth 4 times daily as needed for muscle spasms 120 tablet 1     traMADol (ULTRAM) 50 MG tablet Take 1 tablet (50 mg) by mouth every 6 hours as needed for severe pain 60 tablet 0     vitamin B complex with vitamin C (STRESS TAB) tablet Take 1 tablet by mouth daily       VITAMIN D, CHOLECALCIFEROL, PO Take 2,000 Units by mouth daily        naloxone (NARCAN) 4 MG/0.1ML nasal spray Spray 1 spray (4 mg) into one nostril alternating nostrils once as needed for opioid reversal every 2-3 minutes until assistance arrives (Patient not taking: Reported on 6/16/2023) 0.2 mL 0       PAST SURGICAL HISTORY:  Past Surgical History:   Procedure Laterality Date     AS REPAIR OF NASAL SEPTUM  2009    repair broke nose     BACK SURGERY  12/09/2013  10/01/2014    Spinal fusion L4-S1, L5 moving 5/8ths in. Remove screws/rods     BIOPSY  01/01/2008 & 3/2017    mole biopsy, lymph node biopsy     COLONOSCOPY  3/2017    Colonoscopy and GI     ESOPHAGEAL IMPEDENCE FUNCTION TEST WITH 24 HOUR PH GREATER THAN 1 HOUR N/A 9/17/2019    Procedure: IMPEDANCE PH STUDY, ESOPHAGUS, 24 HOUR;  Surgeon: Raghavendra Grande MD;  Location: [facility]     ESOPHAGOSCOPY, GASTROSCOPY, DUODENOSCOPY (EGD), COMBINED N/A 8/3/2018    Procedure: COMBINED ESOPHAGOSCOPY, GASTROSCOPY, DUODENOSCOPY (EGD), BIOPSY SINGLE OR MULTIPLE;;  Surgeon: Juan Woodson MD;  Location: [facility]     ESOPHAGOSCOPY, GASTROSCOPY, DUODENOSCOPY (EGD), COMBINED N/A 10/22/2018    Procedure: COMBINED ESOPHAGOSCOPY, GASTROSCOPY, DUODENOSCOPY (EGD), BIOPSY SINGLE OR MULTIPLE;  Surgeon: Sadia Carolina MD;  Location: [facility]     ESOPHAGOSCOPY, GASTROSCOPY, DUODENOSCOPY (EGD), COMBINED N/A 12/17/2018    Procedure: COMBINED ESOPHAGOSCOPY, GASTROSCOPY, DUODENOSCOPY (EGD);  Surgeon: Juan Woodson MD;  Location: [facility]     ESOPHAGOSCOPY, GASTROSCOPY, DUODENOSCOPY (EGD), COMBINED N/A 10/14/2022    Procedure: ESOPHAGOGASTRODUODENOSCOPY, WITH BIOPSY;  Surgeon: Jay Shaffer MD;  Location: [facility] OR     INJECT BLOCK MEDIAL BRANCH CERVICAL/THORACIC/LUMBAR Left 9/24/2020    Procedure: BLOCK, NERVE, FACET JOINT, MEDIAL BRANCH, DIAGNOSTIC;  Surgeon: Faiza Martinez MD;  [facility]     INJECT BLOCK MEDIAL BRANCH CERVICAL/THORACIC/LUMBAR Bilateral 10/8/2020    Procedure: Bilateral cervical 3, cervical 4, and cervical 5 medial branch nerve  block;  Surgeon: Faiza Martinez MD;  Location: [facility]     INJECT BLOCK MEDIAL BRANCH CERVICAL/THORACIC/LUMBAR Right 3/4/2021    Procedure: Cervical 3, cervical 4, cervical 5 medial branch nerve blocks to target C3-C4 and C4-C5 facet joint;  Surgeon: Faiza Martinez MD;  Location: [facility]     INJECT EPIDURAL CERVICAL N/A 10/27/2022    Procedure: INJECTION, SPINE, CERVICAL, EPIDURAL, C7-T1;  Surgeon: Faiza Martinez MD;  Location: [facility] OR     INJECT EPIDURAL CERVICAL Bilateral 3/30/2023    Procedure: INJECTION, SPINE, CERVICAL C7-T1, EPIDURAL;  Surgeon: Faiza Martinez MD;  Location: [facility]     INJECT EPIDURAL LUMBAR Left 6/8/2023    Procedure: INJECTION, SPINE, LUMBAR, EPIDURAL;  Surgeon: Faiza Martinez MD;  Location: [facility] OR     INJECT JOINT SACROILIAC Bilateral 6/8/2023    Procedure: INJECT JOINT SACROILIAC (bilateral);  Surgeon: Faiza Martinez MD;  Location: [facility] OR     LAPAROSCOPIC HERNIORRHAPHY HIATAL N/A 2/10/2020    Procedure: LAPAROSCOPIC HIATAL HERNIA REPAIR, NISSEN FUNDOPLICATION, ESOPHOGASTRODUODENOSCOPY, PEG TUBE PLACEMENT.;  Surgeon: Alana Mack MD;  Location: [facility]     NASAL/SINUS POLYPECTOMY  2017    Polyps were benign     NOSE SURGERY  2007    deviated septum     TONSILLECTOMY & ADENOIDECTOMY  1981     TUBAL LIGATION  07/01/2011       ALLERGIES:     Allergies   Allergen Reactions     Baclofen Other (See Comments)     Loss of muscle tone. Muscle weakness.     Bee Venom Shortness Of Breath, Palpitations, Anaphylaxis and Difficulty breathing     Patient does carry Epi-Pen     Chicken-Derived Products (Egg) Nausea and Diarrhea     Compazine [Prochlorperazine]      Extreme jittery     Food      Milk     Gabapentin      Dizzy     Gluten Meal GI Disturbance     Wheat bran and wheat     Pregabalin      Seasonal Allergies      Dust, mold     Topiramate      Pt does not remember reaction       FAMILY HISTORY:  Family History   Problem Relation Age of Onset     Connective Tissue Disorder Mother          "eds     Connective Tissue Disorder Sister         eds     Cancer Father         Spinal tumor grew into spinal cord, went in brain     Migraines Father      Diabetes Maternal Grandmother         Elderly diabetes     Heart Disease Maternal Grandmother      Hypertension Maternal Grandmother      Diabetes Paternal Grandmother         Elderly diabetes     Diabetes Paternal Grandfather         Elderly diabetes     Asthma Sister         Pediatric Asthma     Migraines Sister      Asthma Son         Pediatric Asthma     Thyroid Disease Sister         ,     Migraines Sister      Migraines Sister      Breast Cancer No family hx of      SOCIAL HISTORY:  Social History     Tobacco Use     Smoking status: Former     Packs/day: 0.20     Years: 10.00     Pack years: 2.00     Types: Cigarettes     Start date: 1991     Quit date: 2013     Years since quittin.6     Smokeless tobacco: Never   Vaping Use     Vaping Use: Never used   Substance Use Topics     Alcohol use: Yes     Comment: once a month     Drug use: Yes     Types: Marijuana     Comment: medical marijuana       ROS:   Constitutional: No fever, chills, or sweats. Weight stable.   ENT: No visual disturbance, ear ache, epistaxis, sore throat.   Cardiovascular: As per HPI.   Respiratory: No cough, hemoptysis.    GI: No nausea, vomiting as of blood pressure  : No hematuria.   Integument: Negative.   Psychiatric: Negative.   Hematologic: No problems of shortness blood pressure no easy bleeding.  Neuro: Negative.   Endocrinology: No significant heat or cold intolerance   Musculoskeletal: No myalgia.    Exam:  /82 (BP Location: Right arm, Patient Position: Chair, Cuff Size: Adult Large)   Pulse 53   Ht 1.695 m (5' 6.73\")   Wt 93.7 kg (206 lb 8 oz)   LMP  (LMP Unknown)   SpO2 97%   BMI 32.60 kg/m    GENERAL APPEARANCE: healthy, alert and no distress  HEENT: no icterus,no central cyanosis  NECK Admitted worsening (blood pressure goes up , no asymmetry, " masses, or scars, thyroid normal to palpation and no bruits, JVP not elevated  RESPIRATORY- no rales, rhonchi or wheezes, no use of accessory muscles, no retractions, respirations are unlabored, normal respiratory rate  CARDIOVASCULAR: regular rhythm, normal S1 with physiologic split S2, no S3 or S4 and no murmur, click or rub, precordium quiet with normal PMI.  ABDOMEN: soft, non tender, without hepatosplenomegaly, no masses palpable, bowel sounds normal, aorta not enlarged by palpation, no abdominal bruits I certainly do not have  NEURO: alert and oriented to person/place/time, normal speech, gait and affect  SKIN: no ecchymoses, no rashes    Labs:  CBC RESULTS:   Lab Results   Component Value Date    WBC 6.5 06/24/2022    WBC 6.2 11/13/2020    RBC 4.74 06/24/2022    RBC 4.42 11/13/2020    HGB 14.8 06/24/2022    HGB 12.1 11/13/2020    HCT 43.5 06/24/2022    HCT 38.3 11/13/2020    MCV 92 06/24/2022    MCV 87 11/13/2020    MCH 31.2 06/24/2022    MCH 27.4 11/13/2020    MCHC 34.0 06/24/2022    MCHC 31.6 11/13/2020    RDW 12.1 06/24/2022    RDW 14.6 11/13/2020     06/24/2022     11/13/2020       BMP RESULTS:  Lab Results   Component Value Date     02/17/2023     11/13/2020    POTASSIUM 4.8 02/17/2023    POTASSIUM 4.5 11/13/2020    CHLORIDE 102 02/17/2023    CHLORIDE 102 11/13/2020    CO2 29 02/17/2023    CO2 29 11/13/2020    ANIONGAP 10 02/17/2023    ANIONGAP 4 11/13/2020     (H) 02/17/2023    GLC 96 11/13/2020    BUN 11.9 02/17/2023    BUN 10 11/13/2020    CR 0.80 02/17/2023    CR 0.83 11/13/2020    GFRESTIMATED 89 02/17/2023    GFRESTIMATED 83 11/13/2020    GFRESTBLACK >90 11/13/2020    JUAN 10.0 02/17/2023    JUAN 8.6 11/13/2020        INR RESULTS:  Lab Results   Component Value Date    INR 0.93 01/30/2020    INR 0.97 02/28/2019    INR 0.90 12/17/2018    INR 0.96 07/06/2018       Procedures:  PULMONARY FUNCTION TESTS:        No data to display                  ECHOCARDIOGRAM:   No  results found for this or any previous visit (from the past 8760 hour(s)).      Assessment and Plan:     1.  Autonomic dysfunction under improved control with current strategy  - Improved thermoregulatory issues and fewer heart rate variability symptoms.    Total elapsed time today with chart review, clinic visit and documentation 30 minutes    I very much appreciated the opportunity to see and assess Ada Welch in the clinic today. Please do not hesitate to contact my office if you have any questions or concerns.      Kirk Russell MD  Cardiac Arrhythmia Service  [facility]  [phone]  CC  KIRK RUSSELL

## 2023-07-18 NOTE — PATIENT INSTRUCTIONS
You were seen in the Electrophysiology Clinic today by: Dr Russell    Plan:   Labs/Tests Needed:  echocardiogram    Follow up Visit:  1 year           If you have further questions, please utilize [employer] to contact us.     Your Care Team:    [facility]   Telephone Number     Nurse Line  Cheyenne Coyne, RN   Fabiana Rodriguez, RN   [phone]     For scheduling appointments:   Alexandre   [phone]   For procedure scheduling:    Arlyn Amezcua     [phone]   For the Device Clinic (Pacemakers, ICDs, Loop Recorders)    During business hours: [phone]  After business hours:   [phone]- select option 4 and ask for job code [id number].       On-call cardiologist for after hours or on weekends:   [phone], option #4, and ask to speak to the on-call cardiologist.     Cardiovascular Clinic:   [address] [place] [address]. [place] [address]      As always, Thank you for trusting us with your health care needs!

## 2023-07-20 ENCOUNTER — TELEPHONE (OUTPATIENT)
Dept: INTERNAL MEDICINE | Facility: CLINIC | Age: 53
End: 2023-07-20
Payer: COMMERCIAL

## 2023-07-20 DIAGNOSIS — F51.04 PSYCHOPHYSIOLOGICAL INSOMNIA: ICD-10-CM

## 2023-07-20 DIAGNOSIS — M96.1 POSTLAMINECTOMY SYNDROME: ICD-10-CM

## 2023-07-20 DIAGNOSIS — M96.1 POST LAMINECTOMY SYNDROME: ICD-10-CM

## 2023-07-20 DIAGNOSIS — J31.0 CHRONIC RHINITIS: ICD-10-CM

## 2023-07-20 DIAGNOSIS — M96.1 LUMBAR POST-LAMINECTOMY SYNDROME: ICD-10-CM

## 2023-07-20 NOTE — TELEPHONE ENCOUNTER
Darya!    Ada is looking for the next dose up on the Wegovy to be sent in, so that she can  all of her prescriptions at the same time. She will not be starting it until she is done with the 0.25 mg dose.    Thank you.    Roseanna Welch, PharmD  Staff Pharmacist  [facility]  [phone]

## 2023-07-20 NOTE — TELEPHONE ENCOUNTER
Refill request for 3 medications:     Medication: traMADol (ULTRAM) 50 MG tablet    Sig: Take 1 tablet (50 mg) by mouth every 6 hours as needed for severe pain     Dispensed: 60  Refills: 0  SOLD to the pt on: 6/28/23    Medication: DULoxetine (CYMBALTA) 30 MG capsule    Sig: TAKE 2 CAPSULES BY MOUTH IN THE AM AND 1 CAPSULES AT BEDTIME    Dispensed: 270  Refills: 1  Last prescribed on 2/7/23    Medication: tiZANidine (ZANAFLEX) 4 MG tablet    Sig: Take 1 tablet (4 mg) by mouth 4 times daily as needed for muscle spasms    Dispensed: 120  Refills: 1  Last prescribed on 5/11/23    Last clinic appointment: 5/25/23  Next clinic appointment: 8/10/23    Last Drug Screen Collected: 5/25/23  Controlled Substance Agreement signed: 5/25/23      Preferred pharmacy:    [facility] [address] [place] [address]    Medication refills routed to Dr. Martinez to review.     Mya Cash LPN

## 2023-07-20 NOTE — TELEPHONE ENCOUNTER
Per Dr. Rivera' last note on 7/11:  Weight  She will start Wegovy soon and f/u in 6 weeks    Pt should be continuing dose of Wegovy for 6 weeks per Dr. Rivera. Pharmacist notified.     TYSHAWN RUSSO RN on 7/20/2023 at 2:29 PM

## 2023-07-24 ENCOUNTER — PATIENT OUTREACH (OUTPATIENT)
Dept: CARE COORDINATION | Facility: CLINIC | Age: 53
End: 2023-07-24
Payer: COMMERCIAL

## 2023-07-24 NOTE — PROGRESS NOTES
Social Work - Telephone/MyChart message  [facility]  Data:   Patient Name: Ada Welch  Goes By: Ada    JIA/Age: 1970 (52 year old)      Referral Source: Dr. Rivera, [facility]    Reason for Referral: Insurance questions.     patient has some questions about switching/chosing insurance plans, will be on [place] for first time       Intervention: Left voice message for patient on 2023 .   Plan:  will await patient's return phone call/message and provide assistance at that time.      Monika Barron [facility]  Clinical , Outpatient Specialty Clinics  [facility]  Direct Phone: [phone]

## 2023-07-24 NOTE — TELEPHONE ENCOUNTER
Pt's comment regarding Tramadol refill:     I m now filling  all of my prescriptions at the [facility] location because my daughter in law is a Pharmacist there and knows my history and diagnoses better than another pharmacist could. That said it s a 3 hour round trip for me and I m wondering if there s any way to do a month or even a 3 week supply of Tramadol? I normally only take one am and one pm but if I need to take it more often like it s prescribed, I d have to make that trip every other week and it s not always possible. This is the only prescription I have like this and it would be very helpful if we could adjust it.

## 2023-07-25 RX ORDER — DULOXETIN HYDROCHLORIDE 30 MG/1
CAPSULE, DELAYED RELEASE ORAL
Qty: 270 CAPSULE | Refills: 1 | Status: SHIPPED | OUTPATIENT
Start: 2023-07-25 | End: 2024-02-12

## 2023-07-25 RX ORDER — TRAMADOL HYDROCHLORIDE 50 MG/1
50 TABLET ORAL EVERY 6 HOURS PRN
Qty: 60 TABLET | Refills: 0 | Status: SHIPPED | OUTPATIENT
Start: 2023-07-25 | End: 2023-08-10

## 2023-07-25 RX ORDER — DOXEPIN HYDROCHLORIDE 10 MG/1
10 CAPSULE ORAL AT BEDTIME
Qty: 90 CAPSULE | Refills: 1 | Status: SHIPPED | OUTPATIENT
Start: 2023-07-25 | End: 2023-09-26

## 2023-07-25 RX ORDER — FLUTICASONE PROPIONATE 50 MCG
2 SPRAY, SUSPENSION (ML) NASAL DAILY
Qty: 48 ML | Refills: 11 | Status: SHIPPED | OUTPATIENT
Start: 2023-07-25

## 2023-07-25 NOTE — TELEPHONE ENCOUNTER
Remaining refills sent to requested pharmacy.  fluticasone (FLONASE) 50 MCG/ACT nasal and spraydoxepin (SINEQUAN) 10 MG capsule    Pharmacy [place]

## 2023-08-10 ENCOUNTER — OFFICE VISIT (OUTPATIENT)
Dept: ANESTHESIOLOGY | Facility: CLINIC | Age: 53
End: 2023-08-10
Payer: COMMERCIAL

## 2023-08-10 ENCOUNTER — TELEPHONE (OUTPATIENT)
Dept: ANESTHESIOLOGY | Facility: CLINIC | Age: 53
End: 2023-08-10

## 2023-08-10 VITALS
SYSTOLIC BLOOD PRESSURE: 100 MMHG | DIASTOLIC BLOOD PRESSURE: 66 MMHG | HEIGHT: 67 IN | BODY MASS INDEX: 32.33 KG/M2 | OXYGEN SATURATION: 98 % | WEIGHT: 206 LBS | HEART RATE: 62 BPM

## 2023-08-10 DIAGNOSIS — M47.816 LUMBAR SPONDYLOSIS: Primary | ICD-10-CM

## 2023-08-10 DIAGNOSIS — M54.16 LUMBAR RADICULOPATHY: ICD-10-CM

## 2023-08-10 DIAGNOSIS — M96.1 FAILED BACK SURGICAL SYNDROME: ICD-10-CM

## 2023-08-10 DIAGNOSIS — Z79.891 OPIOID CONTRACT EXISTS: ICD-10-CM

## 2023-08-10 PROCEDURE — 99214 OFFICE O/P EST MOD 30 MIN: CPT | Performed by: ANESTHESIOLOGY

## 2023-08-10 RX ORDER — DIAZEPAM 5 MG
5-10 TABLET ORAL
Status: CANCELLED | OUTPATIENT
Start: 2023-08-10

## 2023-08-10 RX ORDER — TRAMADOL HYDROCHLORIDE 50 MG/1
50 TABLET ORAL EVERY 6 HOURS PRN
Qty: 65 TABLET | Refills: 3 | Status: SHIPPED | OUTPATIENT
Start: 2023-08-10 | End: 2023-08-10

## 2023-08-10 RX ORDER — HYDROMORPHONE HYDROCHLORIDE 2 MG/1
2 TABLET ORAL DAILY PRN
Qty: 10 TABLET | Refills: 0 | Status: SHIPPED | OUTPATIENT
Start: 2023-08-10 | End: 2023-09-14

## 2023-08-10 RX ORDER — TRAMADOL HYDROCHLORIDE 50 MG/1
50 TABLET ORAL EVERY 6 HOURS PRN
Qty: 65 TABLET | Refills: 3 | COMMUNITY
Start: 2023-08-10 | End: 2023-08-24

## 2023-08-10 ASSESSMENT — PAIN SCALES - GENERAL: PAINLEVEL: EXTREME PAIN (8)
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
## 2023-08-10 NOTE — PATIENT INSTRUCTIONS
Medications:    HYDROmorphone (DILAUDID) 2 MG tablet - Take 1 tablet (2 mg) by mouth daily as needed for pain     traMADol (ULTRAM) 50 MG tablet - Take 1 tablet (50 mg) by mouth every 6 hours as needed for severe pain Max of 2 per day     For refills of opioid medications - You MUST call (Or MyChart) the clinic DIRECTLY and at least 7 days before you run out of your medication.      Referrals:    [facility] [place]  [address] [place] [address]  [phone]       Pain Psychology Referral placed-  Please contact their scheduling office at [phone] to schedule, if you have not heard from them within 2 business days.     Pain psychology Therapies Requested- Biofeedback, Mindfulness training, CBT, Coping and relaxation therapy.     Please schedule a follow up appointment with your Pain Clinic provider after completing Pain Psychology appointment. Schedule appointment by calling [phone].      Procedures:    Call to schedule your procedure: [phone] option #2  Bilateral Lumbar L2 and L3 medial branch nerve block to target L2/3, L3/4 facet joints     Your pre-procedure instructions are below, please call our clinic if you have any questions.      Recommended Follow up:      Follow up as needed.       To speak with a nurse, schedule/reschedule/cancel a clinic appointment, or request a medication refill call: [phone].    You can also reach us by [employer]: [email or web address]    Procedure Information related to COVID-19     Please call [phone] option #2 to schedule, reschedule, or cancel your procedure appointment.   Phones are answered Monday - Friday from 08:00 - 4:30pm.  Leave a voicemail with your name, birth date, and phone number if no one is available to take your call.        You no longer need to test for COVID- 19 prior to your procedure/surgery, unless your physician specifically requests that you test. If you experience  COVID symptoms or have tested positive for COVID-19 within 14 days of your scheduled surgery or procedure, please update our office right away and your procedure may have to be postponed.       The procedure center staff will call you several days before the procedure to review important information that you will need to know for the day of the procedure.     Please contact the clinic if you have further questions about this information [phone].        Information related to Scheduling and Pre-Procedure Instructions:    If you must reschedule your procedure more than two times, you must follow up in clinic before rescheduling again.    Preparing for your procedure    CAUTION - FAILURE TO FOLLOW THESE PRE-PROCEDURE INSTRUCTIONS WILL RESULT IN YOUR PROCEDURE BEING RESCHEDULED.    Your Procedure: Bilateral Lumbar L2 and L3 medial branch nerve block to target L2/3, L3/4 facet joints       Pregnancy  If you are pregnant, or think you may be pregnant, please notify our staff. This may or may not affect the ability to perform the procedure.       You must have a  take you home after your procedure. Transportation by taxi or para-transit is okay as long as you have a responsible adult accompany you. You must provide your 's full name and contact number at time of check in.     Fasting Protocol Please have nothing to eat or drink 1 hour prior to arrival.     Medications If you take any medications, DO NOT STOP. Take your medications as usual the day of your procedure with a sip of water AT LEAST 2 HOURS PRIOR TO ARRIVAL.    Antibiotics If you are currently taking antibiotics, you must complete the entire dose 7 days prior to your scheduled procedure. You must be clear of any signs or symptoms of infection. If you begin antibiotics, please contact our clinic for instructions.     Fever, Chills, or Rash If you experience a fever of higher than 100 degrees, chills, rash, or open wounds during the one week  before your procedure, please call the clinic to see if you may proceed with your procedure.      Medication Hold List  **Patients under Cardiology/Neurology care should consult their provider prior to the pain procedure to verify pre-procedure medication instructions. The information below contains general guidelines.**      Blood Thinners If you are taking daily ASPIRIN, PLAVIX, OR OTHER BLOOD THINNERS SUCH AS COUMADIN/WARFARIN, we will need your prescribing doctor to sign a release permitting you to stop these medications. Once approved by your prescribing doctor - STOP ALL BLOOD THINNERS BASED ON THE TIME TABLE BELOW PRIOR TO YOUR PROCEDURE. If you have been instructed to stop WARFARIN(COUMADIN), you must have an INR lab drawn the day before your procedure. Your INR must be within normal limits before we can perform your injection. MEDICATIONS CAN BE RESTARTED AFTER YOUR PROCEDURE.    24 HOUR HOLD  Lovenox (enoxaparin)  Agrylin (Anagrelide)    3 DAY HOLD  Xarelto (rivaroxaban)    5 DAY HOLD  Coumadin (Warfarin)  Brilinta (ticagrelor) 7 DAY HOLD  Anacin, Bufferin, Ecotrin, Excedrin, Aggrenox (Aspirin)  Pradexa (Dabigatran)  Elmiron (Pentosan)  Plavix (Clopidogrel Bisulfate)  Pletal (Cilostazol)    10 DAY HOLD  Effient (Prasugel)    14 DAY HOLD  Ticlid (ticlopidine)        Non-steroidal Anti-inflammatories (NSAIDs) DO NOT TAKE any non-steroidal anti-inflammatory medications (NSAIDs) listed on the table below. MEDICATIONS CAN BE RESTARTED AFTER YOUR PROCEDURE. Celebrex is OK to take and does not need to be discontinued.     Medications to stop:  1 DAY HOLD  Advil, Motrin (Ibuprofen)  Voltaren (Diclofenac)  Toradol (Ketorolac)    3 DAY HOLD  Arthrotec (diciofenac sodium/misoprostol)  Clinoril (Sulindac)  Indocin (Indomethacin)  Lodine (Etodolac)  Vicoprofen (Hydrocodone and Ibuprofen)  Apixaban (Eliquis)    4 DAY HOLD  Mobic (Meloxicam)  Naprosyn (Naproxen)   7 DAY HOLD  Aleve (Naproxen sodium)  Darvon compound  (contains aspirin)  Norgesic Forte (contains aspirin)  Oruvall (Ketoprofen)  Percodan (contains aspirin)  Relafen (Nabumetone)  Salsalate  Trilisate  Vitamin E (more than 400 mg per day)  Any medication containing aspirin    14 DAY HOLD  Daypro (Oxaprozin)  Feldene (Piroxicam)            To speak with a nurse, schedule/reschedule/cancel a clinic appointment, or request a medication refill call: [phone]    You can also reach us by "[employer]: [email or web address]

## 2023-08-10 NOTE — NURSING NOTE
RN read through the instructions with the patient for the recommended procedure: Bilateral Lumbar L2 and L3 medial branch nerve block to target L2/3, L3/4 facet joints  Patient verbalized understanding to holding appropriate medication per protocol and was agreeable to NPO policy and needing a .    Anticoagulant: None reported.    Recommended Follow Up: Follow up as needed.    Daisy Delarosa RNCC

## 2023-08-10 NOTE — TELEPHONE ENCOUNTER
Phoned the patient and left VM. Stated to call and schedule procedure with dr. Martinez.
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
## 2023-08-10 NOTE — NURSING NOTE
Patient presents with:  Follow Up: Follow-up Lower Back, Shoulders, Hip Pain - Multi sites      Extreme Pain (8)     Pain Medications       Analgesics Other Refills Start End     acetaminophen (TYLENOL) 500 MG tablet          Sig - Route: Take 1,000 mg by mouth every 6 hours as needed for mild pain - Oral    Class: Historical      Analgesic Combinations Refills Start End     butalbital-acetaminophen-caffeine (ESGIC) -40 MG tablet    0 10/28/2022     Sig - Route: Take 1 tablet by mouth every 6 hours as needed for headaches - Oral    Class: Historical      Opioid Agonists Refills Start End     HYDROmorphone (DILAUDID) 2 MG tablet    0 6/12/2023     Sig - Route: Take 1 tablet (2 mg) by mouth daily as needed for pain - Oral    Class: E-Prescribe    Earliest Fill Date: 6/12/2023     traMADol (ULTRAM) 50 MG tablet    0 7/25/2023     Sig - Route: Take 1 tablet (50 mg) by mouth every 6 hours as needed for severe pain Max of 2 per day. - Oral    Class: E-Prescribe    No prior authorization was found for this prescription.    Found prior authorization for another prescription for the same medication: Closed - Other      Opioid Partial Agonists Refills Start End     buprenorphine (BUTRANS) 10 MCG/HR WK patch    1 6/16/2023     Sig - Route: Place 1 patch onto the skin every 7 days - Transdermal    Class: E-Prescribe    No prior authorization was found for this prescription.    Found prior authorization for another prescription for the same medication: Approved            What medications are you using for pain? Tramadol, Ibuprofen, tylenol, Medical cannabis    (New patients only) Have you been seen by another pain clinic/ provider? no    (Return Patients only) What refills are you needing today? no

## 2023-08-11 ENCOUNTER — TELEPHONE (OUTPATIENT)
Dept: ANESTHESIOLOGY | Facility: CLINIC | Age: 53
End: 2023-08-11
Payer: COMMERCIAL

## 2023-08-11 PROBLEM — M47.816 LUMBAR SPONDYLOSIS: Status: ACTIVE | Noted: 2023-08-10

## 2023-08-11 NOTE — TELEPHONE ENCOUNTER
Patient is scheduled for surgery with Dr. Martinez    Spoke with: patient    Date of Surgery: 09/14/23    Location: [facility] [place]    Informed patient they will need an adult  yes    Additional comments: Patient is aware of date and time of the procedure.        Kerry Meyer MA on 8/11/2023 at 1:11 PM

## 2023-08-11 NOTE — TELEPHONE ENCOUNTER
RN reviewed patient chart. Pre procedure instructions were reviewed with patient.    Daisy Delarosa RNCC

## 2023-08-22 DIAGNOSIS — M96.1 POSTLAMINECTOMY SYNDROME: ICD-10-CM

## 2023-08-22 RX ORDER — BUPRENORPHINE 10 UG/H
1 PATCH TRANSDERMAL
Qty: 4 PATCH | Refills: 1 | Status: SHIPPED | OUTPATIENT
Start: 2023-08-22 | End: 2023-10-27

## 2023-08-24 DIAGNOSIS — M96.1 POSTLAMINECTOMY SYNDROME: Primary | ICD-10-CM

## 2023-08-24 RX ORDER — TRAMADOL HYDROCHLORIDE 50 MG/1
50 TABLET ORAL EVERY 6 HOURS PRN
Qty: 65 TABLET | Refills: 3 | Status: SHIPPED | OUTPATIENT
Start: 2023-08-24 | End: 2023-11-30

## 2023-08-24 NOTE — TELEPHONE ENCOUNTER
RX from 08/10/2023 was d/c'd due to the error in the sig (max #2/day). Then it was recreated as just a historical RX. Could we please get that sent to us for when the patient needs it?     Thank you,  Jeanne Rudd, [facility] Technician

## 2023-08-28 ENCOUNTER — MYC MEDICAL ADVICE (OUTPATIENT)
Dept: INTERNAL MEDICINE | Facility: CLINIC | Age: 53
End: 2023-08-28
Payer: COMMERCIAL

## 2023-08-28 DIAGNOSIS — R73.09 OTHER ABNORMAL GLUCOSE: ICD-10-CM

## 2023-08-28 NOTE — TELEPHONE ENCOUNTER
Pt was last seen in clinic on 7/11/23. Pt last had Semaglutide-Weight Management (WEGOVY) 0.25 MG/0.5ML pen  refilled on 8/2/23 for a 28 day supply by PCP. Due for refill 8/31/23. Medication refill sent to pharmacy.     TYSHAWN RUSSO RN on 8/28/2023 at 12:41 PM

## 2023-08-30 ENCOUNTER — TELEPHONE (OUTPATIENT)
Dept: INTERNAL MEDICINE | Facility: CLINIC | Age: 53
End: 2023-08-30

## 2023-08-30 ENCOUNTER — VIRTUAL VISIT (OUTPATIENT)
Dept: INTERNAL MEDICINE | Facility: CLINIC | Age: 53
End: 2023-08-30
Payer: COMMERCIAL

## 2023-08-30 DIAGNOSIS — F41.9 ANXIETY DISORDER, UNSPECIFIED TYPE: ICD-10-CM

## 2023-08-30 DIAGNOSIS — G47.00 INSOMNIA, UNSPECIFIED TYPE: Primary | ICD-10-CM

## 2023-08-30 DIAGNOSIS — M96.1 POSTLAMINECTOMY SYNDROME, LUMBAR REGION: ICD-10-CM

## 2023-08-30 DIAGNOSIS — R73.09 OTHER ABNORMAL GLUCOSE: ICD-10-CM

## 2023-08-30 PROCEDURE — 99213 OFFICE O/P EST LOW 20 MIN: CPT | Mod: VID | Performed by: INTERNAL MEDICINE

## 2023-08-30 RX ORDER — ZOLPIDEM TARTRATE 5 MG/1
5 TABLET ORAL
Qty: 30 TABLET | Refills: 0 | Status: SHIPPED | OUTPATIENT
Start: 2023-08-30 | End: 2024-04-01

## 2023-08-30 NOTE — TELEPHONE ENCOUNTER
Hello,     [facility] received an rx for Ozempic in error. But patient is in need of Wegovy 0.5mg subcutaneous once weekly. Ozempic product was sent in error.     Please send a new rx for Wegovy 0.5mg once weekly to [facility].     Thank you!  Lovely Varner, Pharm.D.  [place] Pharmacist for [facility]

## 2023-08-30 NOTE — PROGRESS NOTES
Ada Welch is a 52 year old female who is being evaluated via a billable video visit for the following health issues:    Chief Complaint   Patient presents with    RECHECK       HPI      Scarlett is here today to follow-up on weight, pain    Started Wegovy, 0.25 in July, recently up to 0.5 mg  Reports more energy, had some nausea which dissipated  She had really modified her diet and is frustrated she hasn't lost weight  Still working on pain management from back injury, following with Pain Clinic, plans to get lumbar MBB soon, takes butrans, dilaudid and tramadol prn  States hydroxyzine is helping significantly for anxiety around pain flares    Previously, we stopped amitriptyline given weight gain  Replaced with doxepin, still struggling with sleep  Given recent relationship struggles, she started seeing a therapist for anxiety, wondering about other anxiety drugs  [facility]    Past Documentation:  She is having a pain flare lately, got an SUNSHINE recently 6/8, still having a lot of pain, using tizanidine and tramadol, she got a short term rx for dilaudid, using cannabis as well  Considering repeat fusion    Detailed Medical History:  -EDS-Type 3 Hypermobility. 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 He reports having a Beighton score of 7-8 out of 9. She reports having genetic testing done, which was questionable for the MYLK Gene. She previously followed with a rheumatologist who is an EDS specialist in [place].  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 Russell, has ILR. Ivadrabine and propronolol helping.  -Cervical instability, chiari malformation:  Saw Dr. Moncada, cervical fusion not recommended, considering occipital blocks. Also traveled to [facility] to see Dr. Andrey Sofia.  -Chronic headaches: Daily tension, cervicogenic, migraine, intracranial hypotension. Has not responded well to injections. Hospitalized in 2017 with normal LP and MRI. At that time responded to DHA.  Had  blood patch done 3/19 with good relief of pain. Previously failed amovig and emgality. Blood patch test repeated 9/19. Now managing with medical management, Uses fioricet,maxalt, aleve, tizanidine. Botox.  -Hiatal hernia: Recently saw Dr. Mack and had manometry testing done in Feb 2019 which revealed lower esoph pressure, hiatal hernia, possibly short esophagus, peristalsis was preserved. S/p Nissen surgery 2/20.  -Eosinophilic esophagitis: EGD 12/18 performed for food impaction showed ring at GEJ and mucosal changes c/w EoE. Biopsies 8/18 positive for Eos.  -Lumbar pain: She underwent L4 to S1 fusion in 2013 followed by removal of instrumentation, which did not improve her symptoms. She met with Dr. Martinez in the pain clinic and was taken off high doses of Dilaudid and started on Suboxone in 2017, which dramatically improved her symptoms.  She continues to work with physical therapy twice a week as well as other complementary therapies.   -Cervical pain, no surgical targets per neurosurgery 2023  -IBS, constipation: GES showed rapid emptying 10/17. XR video swallow showed no aspiration 10/17.  -Asthma  -Enlarged cervical lymph nodes: L level 2 node biopsied 10/18 with scant cellularity. Biopsied in past and benign with neg flow cytometry.  -Urinary urge incontinence/incomplete emptying  -Pelvic floor dysfunction, rectocele, [facility]  -Chronic Pain: Has regimen of medical cannabis, tramadol, butran, cymbalta and tizanidine. Uses medical cannabis since 2018 which helps. Follows with Dr. Martinez.       Routine Health Maintenence:  Depression Screening:   PHQ-2 Score:         2/23/2023     8:24 AM 7/14/2022     9:12 AM   PHQ-2 ( 1999 Pfizer)   Q1: Little interest or pleasure in doing things 0 0   Q2: Feeling down, depressed or hopeless 0 0   PHQ-2 Score 0 0        Immunizations (zoster, pneumovax, flu, Tdap, Hep A/B):   There is no immunization history on file for this patient.  Lipids:   Recent Labs   Lab Test  "04/26/18  1010   CHOL 244*   HDL 48*   *   TRIG 156*       Lung Ca Screening (>30 pk age 55-79 or >20 py age 50-79 + RF): n/a low pack years  Colonoscopy (50-75 yrs): 3/17 , due 2027   - One small hyperplastic polyp in the sigmoid colon, removed with a cold snare. Resected and retrieved. Diverticulosis in the sigmoid colon. Repeat colonoscopy in10 years for surveillance   Dexa (>65W or 70M yrs): n/a  Mammogram (40-75 yrs): 1/18 ordered, 6/22  Pap (21-65 yrs): 1/18 HPV neg, due 1/23  HIV/HCV if risk factors:  HIV neg 4/18  Tob/EtOH: screen neg  Lifestyle factors: reviewed  Advanced Directive: deferred                                      Review of Systems   A detailed ROS was performed and was negative unless indicated in the HPI above.        Physical Exam   There were no vitals taken for this visit.    Wt Readings from Last 2 Encounters:   08/10/23 93.4 kg (206 lb)   07/18/23 93.7 kg (206 lb 8 oz)      Ht Readings from Last 2 Encounters:   08/10/23 1.695 m (5' 6.73\")   07/18/23 1.695 m (5' 6.73\")     GENERAL: healthy, alert and no distress  HEAD: Normocephalic, atraumatic  EYES: Eyes grossly normal to inspection, EOMI and conjunctivae and sclerae normal  RESP: Speaking in full sentences, unlabored, no audible wheezes or cough  SKIN: no suspicious lesions or rashes, no jaundice  NEURO: oriented, and speech normal  PSYCH: mentation appears normal, affect normal/bright          Diagnostic Test Results:  Labs reviewed in Epic            Assessment and Plan:  Ada was seen today for recheck.    Diagnoses and all orders for this visit:    Insomnia, unspecified type  She has trialed numerous agents including tramadol, amitriptyline, doxepin. Insurance would not approve BEKA.  Discussed r/b to ambien and will try to use prn, hopefully not long term.  Advised of risks of sedation, parasomnias, dependency.  -     zolpidem (AMBIEN) 5 MG tablet; Take 1 tablet (5 mg) by mouth nightly as needed for sleep (avoid daily " use, do not take with opioids) Call for refills    Anxiety disorder, unspecified type  Hydroxyzine working well, also in care with a therapist.    Postlaminectomy syndrome, lumbar region  Following with pain management, agree with trial of MBB for lumbar spine.    BMI 32.0-32.9,adult  Discussed uptitration of Wegovy, continue dietary efforts.        Video-Visit Details    Type of service:  Video Visit    Video Start/End Time: 12:00 PM  12:25 PM    Originating Location (pt. Location): Home    Distant Location (provider location):  OFF SITE    Mode of Communication:  Video Conference via  [employer] or  "[employer]        Ellyn Rivera MD  Internal Medicine      >20 minutes spent today performing chart review, history and exam, documentation and further activities as noted above.

## 2023-08-30 NOTE — PATIENT INSTRUCTIONS
Thank you for visiting the Primary Care Center today at the [facility]! The following is some information about our clinic:     Primary Care Center Frequently-Asked Questions    (1) How do I schedule appointments at the [facility]?     Primary Care--to schedule or make changes to an existing appointment, please call our primary care line at [phone].    Labs--to schedule a lab appointment at the [facility] you can use [employer] How To or call [phone]. If you have a [place] location that is closer to home, you can reach out to that location for scheduling options.     Imaging--if you need to schedule a CT, X-ray, MRI, ultrasound, or other imaging study you can call [phone] to schedule at the [facility] or any other [facility] imaging location.     Referrals--if a referral to another specialty was ordered you can expect a phone call from their scheduling team. If you have not heard from them in a week, please call us or send us a [employer] How To message to check the status or get a scheduling number. Please note that this only applies to internal [facility] referrals. If the referral is external you would need to contact their office for scheduling.     (2) I have a question about my visit, who do I contact?     You can call us at the primary care line at [phone] to ask questions about your visit. You can also send a secure message through [employer] How To, which is reviewed by clinic staff. Please note that [employer] messages have a twenty-four to forty-eight business hour turnaround time and should not be used for urgent concerns.    (3) How will I get the results of my tests?    If you are signed up for [employer] all tests will be released to you within twenty-four hours of resulting. Please allow three to five days for your doctor to review your results and place a note interpreting the results. If you do not have [employer] you will receive your  results through mail seven to ten business days following the return of the tests. Please note that if there should be any urgent or concerning results that your doctor or their registered nurse will reach out to you the same day as the tests come back. If you have follow up questions about your results or would like to discuss the results in detail please schedule a follow up with your provider either in person or virtually.     (4) How do I get refills of my prescriptions?     You should always first contact your pharmacy for refills of your medications. If submitting a refill request on [employer], please be sure to submit the request only once--repeat requests can cause delays in refill. If you are requesting a NEW medication or a medication related to new symptoms you will need to schedule an appointment with a provider prior to approval. Please note: Routine medication refills have up to one to three business day turnaround whereas controlled substances refills have up to five to seven business day turnaround.    (5) I have new symptoms, what do I do?     If you are having an immediate medical emergency, you should dial 911 for assistance.   For anything urgent that needs to be seen within a few hours to one day you should visit a local urgent care for assistance.  For non-urgent symptoms that need to be seen within a few days to a week you can schedule with an available provider in primary care by going to [employer] or calling [phone].   If you are not sure how serious your symptoms are or you would like to receive medical advice you can always call [phone] to speak with a triage nurse.

## 2023-08-30 NOTE — NURSING NOTE
Is the patient currently in the state of [place]? YES    Visit mode:VIDEO    If the visit is dropped, the patient can be reconnected by: VIDEO VISIT: Text to cell phone:   Telephone Information:   Mobile [phone]       Will anyone else be joining the visit? NO  (If patient encounters technical issues they should call [phone]    How would you like to obtain your AVS? MyChart    Are changes needed to the allergy or medication list? Pt stated no changes to allergies and Pt stated no med changes    Reason for visit: SARAI TREVINO

## 2023-08-31 ENCOUNTER — TELEPHONE (OUTPATIENT)
Dept: ANESTHESIOLOGY | Facility: CLINIC | Age: 53
End: 2023-08-31
Payer: COMMERCIAL

## 2023-08-31 DIAGNOSIS — M54.16 LUMBAR RADICULOPATHY: Primary | ICD-10-CM

## 2023-08-31 DIAGNOSIS — M47.816 LUMBAR SPONDYLOSIS: ICD-10-CM

## 2023-08-31 NOTE — TELEPHONE ENCOUNTER
RN left voicemail informing patient her procedure on 9/14 has been denied by insurance. Patient is needing to complete 3 months of PT. Writer will place PT referral per provider request and update procedure  to cancel procedure.    Daisy Delarosa RNCC

## 2023-09-01 ENCOUNTER — THERAPY VISIT (OUTPATIENT)
Dept: PHYSICAL THERAPY | Facility: CLINIC | Age: 53
End: 2023-09-01
Attending: INTERNAL MEDICINE
Payer: COMMERCIAL

## 2023-09-01 DIAGNOSIS — Q79.60 EDS (EHLERS-DANLOS SYNDROME): ICD-10-CM

## 2023-09-01 PROCEDURE — 97161 PT EVAL LOW COMPLEX 20 MIN: CPT | Mod: GP | Performed by: PHYSICAL THERAPIST

## 2023-09-01 PROCEDURE — 97110 THERAPEUTIC EXERCISES: CPT | Mod: GP | Performed by: PHYSICAL THERAPIST

## 2023-09-01 NOTE — PROGRESS NOTES
PHYSICAL THERAPY EVALUATION  Type of Visit: Evaluation    See electronic medical record for Abuse and Falls Screening details.    Subjective       Presenting condition or subjective complaint: EDS. Neck is feeling pretty unstable and having referred pain into shoulders and hands/fingers. Doing pool therapy with gurpreet montoya but has only done the evaluation. She really wants help getting moving again-- she had bigger pain flare in April because of needing to move lots of furniture and just feels like she is not back to her normal. Has needed to take opiods more than normal for pain. Feels like it takes her several days of rest between activities for her to recover. For example, did back to school night with daughter and then needed a few days of rest. Pain and fatigue are biggest limiting factor for her. If pain is very intense, uses dilaudid (1 per day) and takes the edge of the pain. Using tramdol as well for pain 2 times per day. But feels like she is using dilaudid more than she wants to. She has done PT in the past when she has done PT it has helped her.    Doing yoga and meditation, restorative yoga (5-10 minutes); feels like walking tolerance is pretty limited d/t pain and fatigue.     Date of onset: 07/12/23    Relevant medical history: Anemia; Arthritis; Bladder or bowel problems; Cold or hot arm or leg; Concussions; Diabetes; Dizziness; Hearing problems; Heart problems; Incontinence; Menopause; Migraines or headaches; Neck injury; Non-healing wounds; Numbness or tingling in perianal area; Ongoing fever or chills; Overweight; Pain at night or rest; Progressive neurological deficits; Severe dizziness; Severe headaches; Sleep disorder like apnea (hx of dysautonomia with erratic HRs and BP)     Prior diagnostic imaging/testing results: MRI; CT scan; X-ray; EMG; Video fluoroscopic swallow study     Prior therapy history for the same diagnosis, illness or injury: Yes [facility]    Living Environment  Social  support: With a significant other or spouse   Type of home: House   Stairs to enter the home: Yes 8 Is there a railing: Yes   Ramp: No   Stairs inside the home: Yes       Help at home:    Equipment owned: Straight Cane; Crutches; Standard wheelchair     Employment: No    Hobbies/Interests: Outdoor activities, reading, drawing    Patient goals for therapy: A lot    Pain assessment:  pain from hips that radiates into knees, sciatic pain; low back and neck have a baseline ache to then increases from this.      Objective   Cognitive Status Examination  Orientation: Oriented to person, place and time   Level of Consciousness: Alert  Follows Commands and Answers Questions: 100% of the time  Personal Safety and Judgement: Intact  Memory: Intact    OBSERVATION: arrives by herself today   INTEGUMENTARY: Intact  POSTURE:  pes cavus B that is mild, increased lumbar lordosis, stiff through spine, tendency for slight neck extension in sitting instead of neutral chin  PALPATION:  n/t today   RANGE OF MOTION:  hypermobility noted in shoulders with flexion and ABD past 180 degrees, instability in neck at joints but limited with ROM d/t pain (45 degrees of neck rotation, extension to ~10 degrees with pain, able to flex chin to chest no issues)  STRENGTH:  4/5 shoulder ER, 4/5 ankle DF, decreased WS into LLE with sit to stand and needs to do this at slow pace d/t pain; deferred some strength testing d/t pain  , weak deep neck flexors with pain    BED MOBILITY:  slow and painful    TRANSFERS:  slow sit to stand with some increase in pain    WHEELCHAIR MOBILITY: n/a    GAIT:   Level of [place]: Independent  Assistive Device(s): None  Gait Deviations: Base of support increased  Decreased arm swing, stiff posture, pain in hips and back  Gait Distance: ambulated around the clinic  Stairs: did not perform today    BALANCE:  denies any major balance issues but she also tends to move slowly to avoid issues    10 M walk test-- guarded  posture 15.2 secs    SENSATION:  reports numbness in B L toes in sitting position and also gets referred pain from neck into hands and shoulders.    Assessment & Plan   CLINICAL IMPRESSIONS  Medical Diagnosis: EDS    Treatment Diagnosis: hypermobility, pain, weakness   Impression/Assessment: Patient is a 52 year old female with hypermobility/instability complaints in setting of EDS.  The following significant findings have been identified: Pain, Decreased ROM/flexibility, Decreased strength, Impaired gait, Impaired posture, Instability, and Dizziness. These impairments interfere with their ability to perform self care tasks, recreational activities, household chores, driving , household mobility, and community mobility as compared to previous level of function. Patient will benefit from skilled PT to address her areas or impairment and improve her pain.    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  Target Date: 11/29/23  PT Goal 2  Goal Identifier: pain  Goal Description: Ada will report a 3/10 or < pain in neck and low back during PT session in order to show improved stability and body mechanics required for daily mobility.  Target Date: 11/29/23  PT Goal 3  Goal Identifier: HEP  Goal Description: Ada will be IND and compliant with HEP for strengthening and stability in order to improve her overall mobility and decrease pain  levels.  Target Date: 11/29/23      Frequency of Treatment: 1  Duration of Treatment: up to 90 days    Recommended Referrals to Other Professionals: Occupational Therapy for hand therapy  Education Assessment:   Learner/Method: Patient  Education Comments: education on EDS and HEP    Risks and benefits of evaluation/treatment have been explained.   Patient/Family/caregiver agrees with Plan of Care.     Evaluation Time:     PT Eval, Low Complexity Minutes (34919): 30       Signing Clinician: JESSICA Ansari [facility]                                                                                   OUTPATIENT PHYSICAL THERAPY      PLAN OF TREATMENT FOR OUTPATIENT REHABILITATION   Patient's Last Name, First Name, Ada Nash YOB: 1970   Provider's Name   [facility]   Medical Record No.  [id number]     Onset Date: 07/12/23  Start of Care Date: 09/01/23     Medical Diagnosis:  EDS      PT Treatment Diagnosis:  hypermobility, pain, weakness Plan of Treatment  Frequency/Duration: 1/ up to 90 days    Certification date from 09/01/23 to 11/29/23         See note for plan of treatment details and functional goals     Kristel Ferrera PT                         I CERTIFY THE NEED FOR THESE SERVICES FURNISHED UNDER        THIS PLAN OF TREATMENT AND WHILE UNDER MY CARE     (Physician attestation of this document indicates review and certification of the therapy plan).                Referring Provider:  Ellyn Rivera      Initial Assessment  See Epic Evaluation- Start of Care Date: 09/01/23

## 2023-09-14 DIAGNOSIS — M96.1 FAILED BACK SURGICAL SYNDROME: ICD-10-CM

## 2023-09-14 RX ORDER — HYDROMORPHONE HYDROCHLORIDE 2 MG/1
2 TABLET ORAL DAILY PRN
Qty: 20 TABLET | Refills: 0 | Status: SHIPPED | OUTPATIENT
Start: 2023-09-14 | End: 2023-09-21

## 2023-09-15 ENCOUNTER — HOSPITAL ENCOUNTER (EMERGENCY)
Facility: CLINIC | Age: 53
Discharge: HOME | End: 2023-09-15
Attending: EMERGENCY MEDICINE | Admitting: EMERGENCY MEDICINE
Payer: COMMERCIAL

## 2023-09-15 ENCOUNTER — APPOINTMENT (OUTPATIENT)
Dept: CT IMAGING | Facility: CLINIC | Age: 53
End: 2023-09-15
Attending: EMERGENCY MEDICINE
Payer: COMMERCIAL

## 2023-09-15 VITALS
OXYGEN SATURATION: 98 % | HEART RATE: 59 BPM | DIASTOLIC BLOOD PRESSURE: 87 MMHG | RESPIRATION RATE: 16 BRPM | BODY MASS INDEX: 30.61 KG/M2 | SYSTOLIC BLOOD PRESSURE: 143 MMHG | WEIGHT: 195 LBS | HEIGHT: 67 IN

## 2023-09-15 DIAGNOSIS — G89.29 CHRONIC MID BACK PAIN: ICD-10-CM

## 2023-09-15 DIAGNOSIS — M54.9 CHRONIC MID BACK PAIN: ICD-10-CM

## 2023-09-15 LAB
ALBUMIN SERPL BCG-MCNC: 4.6 G/DL (ref 3.5–5.2)
ALP SERPL-CCNC: 93 U/L (ref 35–104)
ALT SERPL W P-5'-P-CCNC: 34 U/L (ref 0–50)
ANION GAP SERPL CALCULATED.3IONS-SCNC: 14 MMOL/L (ref 7–15)
AST SERPL W P-5'-P-CCNC: 38 U/L (ref 0–45)
BASOPHILS # BLD AUTO: 0 10E3/UL (ref 0–0.2)
BASOPHILS NFR BLD AUTO: 0 %
BILIRUB SERPL-MCNC: 0.8 MG/DL
BUN SERPL-MCNC: 8.7 MG/DL (ref 6–20)
CALCIUM SERPL-MCNC: 9.7 MG/DL (ref 8.6–10)
CHLORIDE SERPL-SCNC: 104 MMOL/L (ref 98–107)
CREAT BLD-MCNC: 0.8 MG/DL (ref 0.5–1)
CREAT SERPL-MCNC: 0.83 MG/DL (ref 0.51–0.95)
DEPRECATED HCO3 PLAS-SCNC: 23 MMOL/L (ref 22–29)
EGFRCR SERPLBLD CKD-EPI 2021: 85 ML/MIN/1.73M2
EGFRCR SERPLBLD CKD-EPI 2021: >60 ML/MIN/1.73M2
EOSINOPHIL # BLD AUTO: 0.1 10E3/UL (ref 0–0.7)
EOSINOPHIL NFR BLD AUTO: 2 %
ERYTHROCYTE [DISTWIDTH] IN BLOOD BY AUTOMATED COUNT: 11.9 % (ref 10–15)
GLUCOSE SERPL-MCNC: 81 MG/DL (ref 70–99)
HCT VFR BLD AUTO: 42 % (ref 35–47)
HGB BLD-MCNC: 14.7 G/DL (ref 11.7–15.7)
IMM GRANULOCYTES # BLD: 0 10E3/UL
IMM GRANULOCYTES NFR BLD: 0 %
LYMPHOCYTES # BLD AUTO: 1.6 10E3/UL (ref 0.8–5.3)
LYMPHOCYTES NFR BLD AUTO: 23 %
MCH RBC QN AUTO: 32 PG (ref 26.5–33)
MCHC RBC AUTO-ENTMCNC: 35 G/DL (ref 31.5–36.5)
MCV RBC AUTO: 91 FL (ref 78–100)
MONOCYTES # BLD AUTO: 0.3 10E3/UL (ref 0–1.3)
MONOCYTES NFR BLD AUTO: 5 %
NEUTROPHILS # BLD AUTO: 4.7 10E3/UL (ref 1.6–8.3)
NEUTROPHILS NFR BLD AUTO: 70 %
NRBC # BLD AUTO: 0 10E3/UL
NRBC BLD AUTO-RTO: 0 /100
PLATELET # BLD AUTO: 217 10E3/UL (ref 150–450)
POTASSIUM SERPL-SCNC: 3.9 MMOL/L (ref 3.4–5.3)
PROT SERPL-MCNC: 7.1 G/DL (ref 6.4–8.3)
RBC # BLD AUTO: 4.6 10E6/UL (ref 3.8–5.2)
SODIUM SERPL-SCNC: 141 MMOL/L (ref 136–145)
TROPONIN T SERPL HS-MCNC: <6 NG/L
WBC # BLD AUTO: 6.8 10E3/UL (ref 4–11)

## 2023-09-15 PROCEDURE — 999N000104 CT THORACIC SPINE RECONSTRUCTED

## 2023-09-15 PROCEDURE — 82565 ASSAY OF CREATININE: CPT

## 2023-09-15 PROCEDURE — 999N000104 CT LUMBAR SPINE RECONSTRUCTED

## 2023-09-15 PROCEDURE — 96374 THER/PROPH/DIAG INJ IV PUSH: CPT | Mod: 59 | Performed by: EMERGENCY MEDICINE

## 2023-09-15 PROCEDURE — 80053 COMPREHEN METABOLIC PANEL: CPT | Performed by: EMERGENCY MEDICINE

## 2023-09-15 PROCEDURE — 71275 CT ANGIOGRAPHY CHEST: CPT | Mod: 26 | Performed by: RADIOLOGY

## 2023-09-15 PROCEDURE — 72128 CT CHEST SPINE W/O DYE: CPT | Mod: 26 | Performed by: RADIOLOGY

## 2023-09-15 PROCEDURE — 96376 TX/PRO/DX INJ SAME DRUG ADON: CPT | Performed by: EMERGENCY MEDICINE

## 2023-09-15 PROCEDURE — 96375 TX/PRO/DX INJ NEW DRUG ADDON: CPT | Performed by: EMERGENCY MEDICINE

## 2023-09-15 PROCEDURE — 84484 ASSAY OF TROPONIN QUANT: CPT | Performed by: EMERGENCY MEDICINE

## 2023-09-15 PROCEDURE — 250N000013 HC RX MED GY IP 250 OP 250 PS 637: Performed by: EMERGENCY MEDICINE

## 2023-09-15 PROCEDURE — 85025 COMPLETE CBC W/AUTO DIFF WBC: CPT | Performed by: EMERGENCY MEDICINE

## 2023-09-15 PROCEDURE — 74174 CTA ABD&PLVS W/CONTRAST: CPT

## 2023-09-15 PROCEDURE — 99285 EMERGENCY DEPT VISIT HI MDM: CPT | Performed by: EMERGENCY MEDICINE

## 2023-09-15 PROCEDURE — 99285 EMERGENCY DEPT VISIT HI MDM: CPT | Mod: 25 | Performed by: EMERGENCY MEDICINE

## 2023-09-15 PROCEDURE — 74174 CTA ABD&PLVS W/CONTRAST: CPT | Mod: 26 | Performed by: RADIOLOGY

## 2023-09-15 PROCEDURE — 250N000011 HC RX IP 250 OP 636: Performed by: EMERGENCY MEDICINE

## 2023-09-15 PROCEDURE — 36415 COLL VENOUS BLD VENIPUNCTURE: CPT | Performed by: EMERGENCY MEDICINE

## 2023-09-15 PROCEDURE — 72131 CT LUMBAR SPINE W/O DYE: CPT | Mod: 26 | Performed by: RADIOLOGY

## 2023-09-15 RX ORDER — ONDANSETRON 2 MG/ML
4 INJECTION INTRAMUSCULAR; INTRAVENOUS ONCE
Status: COMPLETED | OUTPATIENT
Start: 2023-09-15 | End: 2023-09-15

## 2023-09-15 RX ORDER — METHOCARBAMOL 500 MG/1
500 TABLET, FILM COATED ORAL ONCE
Status: COMPLETED | OUTPATIENT
Start: 2023-09-15 | End: 2023-09-15

## 2023-09-15 RX ORDER — HYDROMORPHONE HYDROCHLORIDE 1 MG/ML
0.5 INJECTION, SOLUTION INTRAMUSCULAR; INTRAVENOUS; SUBCUTANEOUS
Status: DISCONTINUED | OUTPATIENT
Start: 2023-09-15 | End: 2023-09-15 | Stop reason: HOSPADM

## 2023-09-15 RX ORDER — IOPAMIDOL 755 MG/ML
90 INJECTION, SOLUTION INTRAVASCULAR ONCE
Status: COMPLETED | OUTPATIENT
Start: 2023-09-15 | End: 2023-09-15

## 2023-09-15 RX ORDER — RIZATRIPTAN BENZOATE 5 MG/1
5-10 TABLET ORAL ONCE
Status: DISCONTINUED | OUTPATIENT
Start: 2023-09-15 | End: 2023-09-15 | Stop reason: HOSPADM

## 2023-09-15 RX ADMIN — ONDANSETRON 4 MG: 2 INJECTION INTRAMUSCULAR; INTRAVENOUS at 12:00

## 2023-09-15 RX ADMIN — HYDROMORPHONE HYDROCHLORIDE 0.5 MG: 1 INJECTION, SOLUTION INTRAMUSCULAR; INTRAVENOUS; SUBCUTANEOUS at 13:06

## 2023-09-15 RX ADMIN — HYDROMORPHONE HYDROCHLORIDE 0.5 MG: 1 INJECTION, SOLUTION INTRAMUSCULAR; INTRAVENOUS; SUBCUTANEOUS at 12:00

## 2023-09-15 RX ADMIN — HYDROMORPHONE HYDROCHLORIDE 0.5 MG: 1 INJECTION, SOLUTION INTRAMUSCULAR; INTRAVENOUS; SUBCUTANEOUS at 15:50

## 2023-09-15 RX ADMIN — METHOCARBAMOL 500 MG: 500 TABLET ORAL at 14:59

## 2023-09-15 RX ADMIN — ONDANSETRON 4 MG: 2 INJECTION INTRAMUSCULAR; INTRAVENOUS at 14:50

## 2023-09-15 RX ADMIN — IOPAMIDOL 90 ML: 755 INJECTION, SOLUTION INTRAVENOUS at 13:56

## 2023-09-15 RX ADMIN — HYDROMORPHONE HYDROCHLORIDE 0.5 MG: 1 INJECTION, SOLUTION INTRAMUSCULAR; INTRAVENOUS; SUBCUTANEOUS at 14:33

## 2023-09-15 ASSESSMENT — ACTIVITIES OF DAILY LIVING (ADL)
ADLS_ACUITY_SCORE: 33
ADLS_ACUITY_SCORE: 35
ADLS_ACUITY_SCORE: 35

## 2023-09-15 NOTE — ED NOTES
"     Emergency Department Patient Sign-out       Brief HPI:  This is a 52 year old female signed out to me by Dr. Porter .  See initial ED Provider note for details of the presentation.            Significant Events prior to my assuming care: the pt is a 52 yof with a PMH of Tom Zuniga who presents with acute on chronic back pain. CT ordered to r/o dissection, this was negative. Reads of T and L spine CTs pending at this time.       Exam:   Patient Vitals for the past 24 hrs:   BP Temp src Pulse Resp SpO2 Height Weight   09/15/23 0915 (!) 143/87 Oral 59 16 98 % 1.702 m (5' 7\") 88.5 kg (195 lb)           ED RESULTS:   Results for orders placed or performed during the hospital encounter of 09/15/23 (from the past 24 hour(s))   CBC with platelets differential     Status: None    Collection Time: 09/15/23 12:22 PM    Narrative    The following orders were created for panel order CBC with platelets differential.  Procedure                               Abnormality         Status                     ---------                               -----------         ------                     CBC with platelets and d...[[id number]]                      Final result                 Please view results for these tests on the individual orders.   Comprehensive metabolic panel     Status: Normal    Collection Time: 09/15/23 12:22 PM   Result Value Ref Range    Sodium 141 136 - 145 mmol/L    Potassium 3.9 3.4 - 5.3 mmol/L    Chloride 104 98 - 107 mmol/L    Carbon Dioxide (CO2) 23 22 - 29 mmol/L    Anion Gap 14 7 - 15 mmol/L    Urea Nitrogen 8.7 6.0 - 20.0 mg/dL    Creatinine 0.83 0.51 - 0.95 mg/dL    Calcium 9.7 8.6 - 10.0 mg/dL    Glucose 81 70 - 99 mg/dL    Alkaline Phosphatase 93 35 - 104 U/L    AST 38 0 - 45 U/L    ALT 34 0 - 50 U/L    Protein Total 7.1 6.4 - 8.3 g/dL    Albumin 4.6 3.5 - 5.2 g/dL    Bilirubin Total 0.8 <=1.2 mg/dL    GFR Estimate 85 >60 mL/min/1.73m2   Troponin T, High Sensitivity     Status: Normal    Collection " Time: 09/15/23 12:22 PM   Result Value Ref Range    Troponin T, High Sensitivity <6 <=14 ng/L   CBC with platelets and differential     Status: None    Collection Time: 09/15/23 12:22 PM   Result Value Ref Range    WBC Count 6.8 4.0 - 11.0 10e3/uL    RBC Count 4.60 3.80 - 5.20 10e6/uL    Hemoglobin 14.7 11.7 - 15.7 g/dL    Hematocrit 42.0 35.0 - 47.0 %    MCV 91 78 - 100 fL    MCH 32.0 26.5 - 33.0 pg    MCHC 35.0 31.5 - 36.5 g/dL    RDW 11.9 10.0 - 15.0 %    Platelet Count 217 150 - 450 10e3/uL    % Neutrophils 70 %    % Lymphocytes 23 %    % Monocytes 5 %    % Eosinophils 2 %    % Basophils 0 %    % Immature Granulocytes 0 %    NRBCs per 100 WBC 0 <1 /100    Absolute Neutrophils 4.7 1.6 - 8.3 10e3/uL    Absolute Lymphocytes 1.6 0.8 - 5.3 10e3/uL    Absolute Monocytes 0.3 0.0 - 1.3 10e3/uL    Absolute Eosinophils 0.1 0.0 - 0.7 10e3/uL    Absolute Basophils 0.0 0.0 - 0.2 10e3/uL    Absolute Immature Granulocytes 0.0 <=0.4 10e3/uL    Absolute NRBCs 0.0 10e3/uL   Creatinine POCT     Status: Normal    Collection Time: 09/15/23  1:25 PM   Result Value Ref Range    Creatinine POCT 0.8 0.5 - 1.0 mg/dL    GFR, ESTIMATED POCT >60 >60 mL/min/1.73m2   CT Lumbar Spine Reconstructed     Status: None    Collection Time: 09/15/23  2:32 PM    Narrative    EXAM: CT THORACIC SPINE RECONSTRUCTED, CT LUMBAR SPINE RECONSTRUCTED   9/15/2023 2:32 PM     HISTORY: colette danlos, acute on chronic back pain. Tender T5-T7, L2.  Eval dissection, PE, occult fractures.       COMPARISON: Thoracic radiograph 2/17/2023 lumbar radiograph 6/12/2023    TECHNIQUE: Using multidetector thin collimation helical acquisition  technique, axial, sagittal and coronal 3 mm thickness CT  reconstructions were obtained through the thoracic and lumbar spine  without intravenous contrast. Images were viewed in bone and soft  tissue windows.    FINDINGS:  There are 5 lumbar type vertebrae.     Mild kyphotic curvature of the thoracic spine with trace  anterior  osteophyte formation. Alignment is otherwise anatomic.. Vertebral body  heights are maintained. No significant intervertebral disc space  narrowing. No evidence of acute fracture or traumatic subluxation.  Normal appearance of the prevertebral soft tissues.. Patent spinal  canal and neural foramina. No osseous lesions.    Postsurgical changes related to left L4 and L5 vertebral body  hemilaminectomies, with unremarkable appearing intervertebral disc  spacers at L4-L5 and L5-S1. Multilevel degenerative changes of the  thoracolumbar spine, most pronounced at L3-4 at least moderate spinal  canal stenosis.    Dilatation of the common bile duct. See dedicated same day CT CAP for  chest, abdomen, and pelvis finding.      Impression    IMPRESSION:  1. No acute fracture or traumatic subluxation.  2. Postsurgical changes of L4-5 and L5-S1 hemilaminectomy and fusion.  Multilevel degenerative changes of the thoracolumbar spine, most  pronounced at L3-4 at least moderate spinal canal stenosis.      I have personally reviewed the examination and initial interpretation  and I agree with the findings.    LISA ADAMS MD         SYSTEM ID:  [id number]   CT Thoracic Spine Reconstructed     Status: None    Collection Time: 09/15/23  2:32 PM    Narrative    EXAM: CT THORACIC SPINE RECONSTRUCTED, CT LUMBAR SPINE RECONSTRUCTED   9/15/2023 2:32 PM     HISTORY: colette danlos, acute on chronic back pain. Tender T5-T7, L2.  Eval dissection, PE, occult fractures.       COMPARISON: Thoracic radiograph 2/17/2023 lumbar radiograph 6/12/2023    TECHNIQUE: Using multidetector thin collimation helical acquisition  technique, axial, sagittal and coronal 3 mm thickness CT  reconstructions were obtained through the thoracic and lumbar spine  without intravenous contrast. Images were viewed in bone and soft  tissue windows.    FINDINGS:  There are 5 lumbar type vertebrae.     Mild kyphotic curvature of the thoracic spine with trace  anterior  osteophyte formation. Alignment is otherwise anatomic.. Vertebral body  heights are maintained. No significant intervertebral disc space  narrowing. No evidence of acute fracture or traumatic subluxation.  Normal appearance of the prevertebral soft tissues.. Patent spinal  canal and neural foramina. No osseous lesions.    Postsurgical changes related to left L4 and L5 vertebral body  hemilaminectomies, with unremarkable appearing intervertebral disc  spacers at L4-L5 and L5-S1. Multilevel degenerative changes of the  thoracolumbar spine, most pronounced at L3-4 at least moderate spinal  canal stenosis.    Dilatation of the common bile duct. See dedicated same day CT CAP for  chest, abdomen, and pelvis finding.      Impression    IMPRESSION:  1. No acute fracture or traumatic subluxation.  2. Postsurgical changes of L4-5 and L5-S1 hemilaminectomy and fusion.  Multilevel degenerative changes of the thoracolumbar spine, most  pronounced at L3-4 at least moderate spinal canal stenosis.      I have personally reviewed the examination and initial interpretation  and I agree with the findings.    LISA ADAMS MD         SYSTEM ID:  [id number]   CTA Chest Abdomen Pelvis w Contrast     Status: None    Collection Time: 09/15/23  2:33 PM    Narrative    CTA CHEST, ABDOMEN, AND PELVIS 9/15/2023 2:33 PM    CLINICAL HISTORY: Tom-Danlos, acute on chronic back pain.  Tender  T5-T7, L2.  Evaluate dissection, PE, occult fractures. History of  laparoscopic hiatal hernia repair, Nissen fundoplication, and PEG  placement 2/10/2020.    COMPARISONS: CT chest and abdomen without contrast 8/23/2022    REFERRING PROVIDER: AKBAR ROSE    TECHNIQUE: After the uneventful administration of intravenous  contrast, Flash PE CTA performed through the chest followed by dual  energy CT through the abdomen and pelvis. After a second  administration of contrast, Flash PE CTA repeated through the chest.  VNC maps produced. Coronal and  sagittal reconstructions were produced.  Lung MIP produced.    3D and multiplanar reconstructions were unavailable at the time of  dictation.    CONTRAST: 90 mL Isovue 370 + 67 mL Isovue 370    DOSE (DLP): 2070 mGy*cm    FINDINGS: CTA: First PE CTA scan was not in a pulmonary arterial  phase. PE scan repeated after a second administration of contrast.    No central or segmental pulmonary embolus. RV 42.4 mm / LV 47.6 mm =  0.89 RV:LV.    No aortic dissection.    Right innominate and left common carotid arteries share a common  trunk. Right innominate, subclavian, and carotid, left carotid, and  left subclavian arteries patent. Bilateral vertebral arteries patent.    Celiac, superior mesenteric, renal, and inferior mesenteric arteries  patent.    Aortic measurements:       Sinuses of Valsalva: 38 mm       Sinotubular junction: 28 mm       Ascendin mm       Arch: 22 mm       Proximal descendin mm       Mid descendin mm       Diaphragm: 19 mm       Infrarenal: 14 mm       Above bifurcation: 14 mm    Bilateral common, internal, and external iliac arteries patent.  Bilateral common femoral arteries patent.    Left venous corona mortis.    CT: Sub 6 mm pulmonary nodules.    Nissen fundoplication. Healed PEG track.    Mild thoracic spine degenerative changes. L4-L5 fusion. L5-S1  sclerosis. No vertebral fracture.      Impression    IMPRESSION:  1. No central or segmental pulmonary embolus. Normal RV:LV ratio.    2. No aortic dissection or aneurysm.    3. No vertebral fracture.    HE ZAMORA MD         SYSTEM ID:  [id number]       ED MEDICATIONS:   Medications   HYDROmorphone (PF) (DILAUDID) injection 0.5 mg (0.5 mg Intravenous $Given 9/15/23 1550)   rizatriptan (MAXALT) tablet 5-10 mg (has no administration in time range)   ondansetron (ZOFRAN) injection 4 mg (4 mg Intravenous $Given 9/15/23 1200)   iopamidol (ISOVUE-370) solution 90 mL (90 mLs Intravenous $Given 9/15/23 1356)   sodium chloride (PF) 0.9% PF  flush 100 mL (100 mLs Intravenous $Given 9/15/23 5850)   ondansetron (ZOFRAN) injection 4 mg (4 mg Intravenous $Given 9/15/23 1455)   methocarbamol (ROBAXIN) tablet 500 mg (500 mg Oral $Given 9/15/23 2270)         Impression:    ICD-10-CM    1. Chronic mid back pain  M54.9     G89.29           Plan:    Pending studies include CT T/L spine.    CTs show no acute fracture or traumatic subluxation    Patient to be discharged home. Advised to follow up with PCP within 1 week. To return to ER immediately with any new/worsening symptoms. Plan of care discussed with patient who expresses understanding and agrees with plan of care.          MD Armando Grove Michelle C, MD  09/15/23 8879

## 2023-09-15 NOTE — ED TRIAGE NOTES
Patient  ambulatory to triage for increase back pain radiating to hands, shoulders and feet X few weeks HX L4-S1 revision in 2004 and  EDS     Triage Assessment       Row Name 09/15/23 0920       Triage Assessment (Adult)    Airway WDL WDL       Respiratory WDL    Respiratory WDL X;all     Rhythm/Pattern, Respiratory shortness of breath       Peripheral/Neurovascular WDL    Peripheral Neurovascular WDL X   numbness and hands, feet anbd shoulders    Capillary Refill, General less than/equal to 3 secs       Cognitive/Neuro/Behavioral WDL    Cognitive/Neuro/Behavioral WDL X

## 2023-09-15 NOTE — DISCHARGE INSTRUCTIONS
Follow-up with your specialist to see if there are any additional things that you can do to improve your back pain.  Return to the emergency department if you are unable to walk, have new weakness, paresthesias, trauma, or inability to control your bowel movements or bladder.
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
## 2023-09-18 ENCOUNTER — TELEPHONE (OUTPATIENT)
Dept: INTERNAL MEDICINE | Facility: CLINIC | Age: 53
End: 2023-09-18
Payer: COMMERCIAL

## 2023-09-18 DIAGNOSIS — F51.04 PSYCHOPHYSIOLOGICAL INSOMNIA: ICD-10-CM

## 2023-09-18 NOTE — TELEPHONE ENCOUNTER
[facility] Call Center    Phone Message    May a detailed message be left on voicemail: yes     Reason for Call: Other: Patient was in ER on 09/15/23, she is in a lot of pain in her back, no appts till October, can you find a sooner appt, the pain is making her black out and such, please call, this pain is just as bad as after she had her surgery.     Action Taken: Message routed to:  [facility]    Travel Screening: Not Applicable

## 2023-09-20 NOTE — COMMUNITY RESOURCES LIST (ENGLISH)
09/20/2023   [facility]  N/A  For questions about this resource list or additional care needs, please contact your primary care clinic or care manager.  Phone: [phone]   Email: N/A   Address: [address] [place] [id number]   Hours: N/A        Transportation       Free or low-cost transportation  1  [employer] [place] - Work-related transportation Distance: 11.48 miles      In-Person   [address] [place] [address]  Language: English  Hours: Wed 10:00 AM - 5:00 PM , Thu 10:00 AM - 6:00 PM , Fri 10:00 AM - 5:00 PM , Sat 10:00 AM - 4:00 PM  Fees: Free   Phone: (966) 841-2910 Email: ulisses@[email or web address] Website: http://www.[employer]     Transportation to medical appointments  2  [facility] [employer] Distance: 11.48 miles      In-Person   [address] [place] [address]  Language: English  Hours: Wed 10:00 AM - 5:00 PM , Thu 10:00 AM - 6:00 PM , Fri 10:00 AM - 5:00 PM , Sat 10:00 AM - 4:00 PM  Fees: Free   Phone: (211) 488-5377 Email: [email or web address] Website: http://[email or web address] Ident     3  [employer] [facility] [place] Distance: 12.22 miles      In-Person   [address] [place] [address]  Language: English  Hours: Mon - Fri 6:00 AM - 6:00 PM  Fees: Self Pay   Phone: (724) 314-9098 Website: https://www.[employer]/          Important Numbers & Websites       Emergency Services   911  City Services   311  Poison Control   (145) 218-5501  Suicide Prevention Lifeline   (413) 845-2680 (TALK)  Child Abuse Hotline   (806) 439-3373 (4-A-Child)  Sexual Assault Hotline   (518) 634-6343 (HOPE)  National Runaway Safeline   (941) 401-9356 (RUNAWAY)  All-Options Talkline   (669) 149-4828  Substance Abuse Referral   (573) 190-7277 (HELP)

## 2023-09-21 ENCOUNTER — OFFICE VISIT (OUTPATIENT)
Dept: INTERNAL MEDICINE | Facility: CLINIC | Age: 53
End: 2023-09-21
Payer: COMMERCIAL

## 2023-09-21 VITALS
DIASTOLIC BLOOD PRESSURE: 87 MMHG | WEIGHT: 198 LBS | HEART RATE: 77 BPM | OXYGEN SATURATION: 97 % | SYSTOLIC BLOOD PRESSURE: 128 MMHG | BODY MASS INDEX: 31.01 KG/M2

## 2023-09-21 DIAGNOSIS — R55 PRE-SYNCOPE: ICD-10-CM

## 2023-09-21 DIAGNOSIS — M96.1 FAILED BACK SURGICAL SYNDROME: ICD-10-CM

## 2023-09-21 DIAGNOSIS — M54.40 LOW BACK PAIN OF MULTIPLE SITES OF SPINE WITH SCIATICA: ICD-10-CM

## 2023-09-21 DIAGNOSIS — M54.2 CERVICALGIA: Primary | ICD-10-CM

## 2023-09-21 PROCEDURE — 99214 OFFICE O/P EST MOD 30 MIN: CPT | Mod: GC

## 2023-09-21 RX ORDER — DOXEPIN HYDROCHLORIDE 10 MG/1
CAPSULE ORAL
Qty: 90 CAPSULE | OUTPATIENT
Start: 2023-09-21

## 2023-09-21 RX ORDER — HYDROMORPHONE HYDROCHLORIDE 2 MG/1
2 TABLET ORAL DAILY PRN
Qty: 20 TABLET | Refills: 0 | Status: SHIPPED | OUTPATIENT
Start: 2023-09-21 | End: 2024-01-25

## 2023-09-21 NOTE — NURSING NOTE
Ada Welch is a 52 year old female patient that presents today in clinic for the following:    Chief Complaint   Patient presents with    RECHECK     Hospital follow up. Back pain for past 3 weeks. Worst pain felt.  Pain causes Dizziness, nausea, black out.     The patient's allergies and medications were reviewed as noted. A set of vitals were recorded as noted without incident: /87 (BP Location: Right arm, Patient Position: Sitting, Cuff Size: Adult Regular)   Pulse 77   Wt 89.8 kg (198 lb)   LMP  (LMP Unknown)   SpO2 97%   BMI 31.01 kg/m  . The patient does not have any other questions for the provider.    Cecile Aguiar, EMT at 9:59 AM on 9/21/2023.  Primary care clinic: [phone]
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
## 2023-09-21 NOTE — PROGRESS NOTES
"Subjective     Ada Welch is a 52 year old woman, with concerns including:  Chief Complaint   Patient presents with    RECHECK     Hospital follow up. Back pain for past 3 weeks. Worst pain felt.  Pain causes Dizziness, nausea, black out.     PCP: Ellyn Rivera   Visit type: problem-oriented      Has PMH of dysautonomia, EDS, chronic migraines, complex low back pain s/p L4-S1 fusion (and revision surgery), hiatal hernia s/p repair, and HTN who presents for ED follow-up.     She presented to the ED on 9/15/2023 with worsening back pain that she felt like was different from her chronic back pain. In particular, she describes an \"angry\" sensation to her entire spine, with a feeling of someone cutting her spine at two different specific points. She also feels a pulling sensation at the base of her neck. Prior to this year, she was only having 1 severe pain exacerbation (requiring dilaudid) per year. However, after a tough winter this year and rapidly melting snow in February, her basement flooded and required her to move a lot of things around in her house, which significantly provoked her back pain. She has had more exacerbations since then and has needed dilaudid on 4 different occasions this year alone. Her current pain exacerbation feels even worse.     In the ED, she had a CT T-L spine done that did not show any acute changes. She also had a CTA chest/abd/pelvis to rule out aortic dissection and aortic aneurysm, which was negative. She received a variety of medications in the ED including IV dilaudid, zofran, and methocarbamol. She does not think anything particularly helped except the IV dilaudid.     She is currently taking tizanidine as a muscle relaxant. She has previously tried methocarbamol and cyclobenzaprine, but she feels like tizanidine has worked the best in terms of symptom relief, though it does cause her dry mouth as well.     She also describes episodes during neck extension where she will " "\"black out.\" This particularly occurs during times of prolonged neck extension.       Review of Systems    As part of this encounter, I reviewed (and updated as appropriate) the past medical, family, and social history.    Past Medical History:   Diagnosis Date    Allergic rhinitis 09/2007    Anxiety     Arthritis 11/01/2013    Found during search for cause of back pain    Autoimmune disease (H) 2016    Immunosuppresive    Autonomic dysfunction     Bleeding disorder (H) 2016    Bruise easily    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    Cervicalgia     Chronic sinusitis 00/2007    Diagnosed with chronic pansinusitis 2016    Coagulation disorder (H)     factor 8    CSF leak     Chiari malformation    Depression     Tom-Danlos disease     Eosinophilic esophagitis 08/2018    Gastroesophageal reflux disease 3/1/2017    I have large hiatal hernia    Hiatal hernia 2016    Have adeline hiatel hernia    Hoarseness Unsure    It comes and goes    Hypertension     Hypothyroid     IBS (irritable bowel syndrome) with constipation     improved on Linzess    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    Irregular heart beat     Migraines 1/1/2007    Unsure of exact date    Nasal polyps 2013    Were revoved 03/2017, benign    Orthostatic hypotension     Other nervous system complications 1/2014    Autonomic nervous system disorder, neuralgia, neuropathy    Swelling, mass, or lump in head and neck 08/2016    Lymph node has been growing for last year    Thyroid disease 01/01/2008    Urinary incontinence     Urinary urgency      Past Surgical History:   Procedure Laterality Date    AS REPAIR OF NASAL SEPTUM  2009    repair broke nose    BACK SURGERY  12/09/2013  10/01/2014    Spinal fusion L4-S1, L5 moving 5/8ths in. Remove screws/rods, Dr. Vega    BIOPSY  01/01/2008 & 3/2017    mole biopsy, lymph node biopsy    COLONOSCOPY  03/2017    Colonoscopy and GI    ESOPHAGEAL IMPEDENCE " FUNCTION TEST WITH 24 HOUR PH GREATER THAN 1 HOUR N/A 09/17/2019    Procedure: IMPEDANCE PH STUDY, ESOPHAGUS, 24 HOUR;  Surgeon: Raghavendra Grande MD;  Location:  GI    ESOPHAGOSCOPY, GASTROSCOPY, DUODENOSCOPY (EGD), COMBINED N/A 08/03/2018    Procedure: COMBINED ESOPHAGOSCOPY, GASTROSCOPY, DUODENOSCOPY (EGD), BIOPSY SINGLE OR MULTIPLE;;  Surgeon: Juan Woodson MD;  Location:  GI    ESOPHAGOSCOPY, GASTROSCOPY, DUODENOSCOPY (EGD), COMBINED N/A 10/22/2018    Procedure: COMBINED ESOPHAGOSCOPY, GASTROSCOPY, DUODENOSCOPY (EGD), BIOPSY SINGLE OR MULTIPLE;  Surgeon: Sadia Carolina MD;  Location:  GI    ESOPHAGOSCOPY, GASTROSCOPY, DUODENOSCOPY (EGD), COMBINED N/A 12/17/2018    Procedure: COMBINED ESOPHAGOSCOPY, GASTROSCOPY, DUODENOSCOPY (EGD);  Surgeon: Juan Woodson MD;  Location:  GI    ESOPHAGOSCOPY, GASTROSCOPY, DUODENOSCOPY (EGD), COMBINED N/A 10/14/2022    Procedure: ESOPHAGOGASTRODUODENOSCOPY, WITH BIOPSY;  Surgeon: Jay Shaffer MD;  Location: [facility] OR    INJECT BLOCK MEDIAL BRANCH CERVICAL/THORACIC/LUMBAR Left 09/24/2020    Procedure: BLOCK, NERVE, FACET JOINT, MEDIAL BRANCH, DIAGNOSTIC;  Surgeon: Faiza Martinez MD;  [facility]    INJECT BLOCK MEDIAL BRANCH CERVICAL/THORACIC/LUMBAR Bilateral 10/08/2020    Procedure: Bilateral cervical 3, cervical 4, and cervical 5 medial branch nerve block;  Surgeon: Faiza Martinez MD;  Location: [facility] OR    INJECT BLOCK MEDIAL BRANCH CERVICAL/THORACIC/LUMBAR Right 03/04/2021    Procedure: Cervical 3, cervical 4, cervical 5 medial branch nerve blocks to target C3-C4 and C4-C5 facet joint;  Surgeon: Faiza Martinez MD;  Location: [facility] OR    INJECT EPIDURAL CERVICAL N/A 10/27/2022    Procedure: INJECTION, SPINE, CERVICAL, EPIDURAL, C7-T1;  Surgeon: Faiza Martinez MD;  Location: [facility] OR    INJECT EPIDURAL CERVICAL Bilateral 03/30/2023    Procedure: INJECTION, SPINE, CERVICAL C7-T1, EPIDURAL;  Surgeon: Faiza Martinez MD;  Location: [facility] OR    INJECT EPIDURAL  "LUMBAR Left 06/08/2023    Procedure: INJECTION, SPINE, LUMBAR, EPIDURAL;  Surgeon: Faiza Martinez MD;  Location: [facility] OR    INJECT JOINT SACROILIAC Bilateral 06/08/2023    Procedure: INJECT JOINT SACROILIAC (bilateral);  Surgeon: Faiza Martinez MD;  Location: [facility] OR    LAPAROSCOPIC HERNIORRHAPHY HIATAL N/A 02/10/2020    Procedure: LAPAROSCOPIC HIATAL HERNIA REPAIR, NISSEN FUNDOPLICATION, ESOPHOGASTRODUODENOSCOPY, PEG TUBE PLACEMENT.;  Surgeon: Alana Mack MD;  Location: [facility] OR    NASAL/SINUS POLYPECTOMY  2017    Polyps were benign    NOSE SURGERY  2007    deviated septum    TONSILLECTOMY & ADENOIDECTOMY  1981    TUBAL LIGATION  07/01/2011         Objective     /87 (BP Location: Right arm, Patient Position: Sitting, Cuff Size: Adult Regular)   Pulse 77   Wt 89.8 kg (198 lb)   LMP  (LMP Unknown)   SpO2 97%   BMI 31.01 kg/m      Physical Exam   Gen: Appears visibly anxious and distressed during moments.  HEENT: Normocephalic, atraumatic. PERRLA, EOMI. Tenderness to palpation of base of posterior neck. Neck ROM normal.   CV: Regular rate.   Pulm: Non-labored breathing.   MSK: Paraspinal tenderness along entire spine. Point tenderness palpated in mid thoracic spine and in lower lumbar-sacral area. Sacroiliac tenderness is present bilaterally. There is muscle tightness and tenderness in the upper back.   Neuro: A&O x3. No apparent focal neurological deficits.         Assessment & Plan     Ada is a 52yoF with PMH of dysautonomia, EDS, chronic migraines, complex low back pain s/p L4-S1 fusion (and revision surgery), hiatal hernia s/p repair, and HTN who presents for ED follow-up.     # Acute on chronic back pain  # Presyncope  # Cervicalgia  Presented with acute on chronic pain to ED on 9/15/2023. States that she has not had pain this terrible since the time of her spinal fusion surgery 10 years ago. Given she is also reporting episodes of presyncope or \"blacking out\" with neck extension, we will " further evaluate for possible vertebral artery compression with CTA and also XR flexion/extension to assess for compression of other structures. She is at increased risk for compression given her history of EDS. Her pain is also worse in her lower back and at her sacroiliac joints, so will obtain XR of SI joints as well. In terms of pain control, we discussed following up with Dr. Martinez for additional pain management options.   - CTA head & neck ordered to evaluate for possibility of vertebral artery compression  - XR sacroiliac joints ordered  - XR neck flexion/extension ordered  - XR lumbar spine ordered      Patient seen and discussed with Dr. Cantor.     Joni Venegas MD  PGY-2  Internal Medicine

## 2023-09-21 NOTE — TELEPHONE ENCOUNTER
doxepin (SINEQUAN) 10 MG capsule   90 capsule 1 7/25/2023     Last Office Visit : 9-  Future Office visit:  11-6-2023      Tricyclic Antidepressants Protocol [id number]/18/2023 08:30 AM   Protocol Details Blood pressure under 140/90 in past 12 months     BP Readings from Last 3 Encounters:   09/21/23 128/87   09/15/23 (!) 143/87   08/10/23 100/66

## 2023-09-25 ENCOUNTER — TELEPHONE (OUTPATIENT)
Dept: INTERNAL MEDICINE | Facility: CLINIC | Age: 53
End: 2023-09-25
Payer: COMMERCIAL

## 2023-09-25 DIAGNOSIS — R73.09 OTHER ABNORMAL GLUCOSE: ICD-10-CM

## 2023-09-25 NOTE — TELEPHONE ENCOUNTER
Pt would like Wegovy 0.5mg refilled   Thanks   Shilpa Dillon [facility]  [phone]

## 2023-09-26 ENCOUNTER — VIRTUAL VISIT (OUTPATIENT)
Dept: PHARMACY | Facility: CLINIC | Age: 53
End: 2023-09-26
Payer: COMMERCIAL

## 2023-09-26 DIAGNOSIS — R61 EXCESSIVE SWEATING: ICD-10-CM

## 2023-09-26 DIAGNOSIS — G47.00 INSOMNIA, UNSPECIFIED TYPE: Primary | ICD-10-CM

## 2023-09-26 DIAGNOSIS — M54.16 LUMBAR RADICULOPATHY, CHRONIC: Primary | ICD-10-CM

## 2023-09-26 DIAGNOSIS — R52 PAIN: ICD-10-CM

## 2023-09-26 PROCEDURE — 99207 PR NO CHARGE LOS: CPT | Mod: VID | Performed by: PHARMACIST

## 2023-09-26 RX ORDER — OMEGA-3 FATTY ACIDS/FISH OIL 300-1000MG
600 CAPSULE ORAL EVERY 4 HOURS PRN
COMMUNITY

## 2023-09-26 RX ORDER — RAMELTEON 8 MG/1
8 TABLET ORAL AT BEDTIME
Qty: 30 TABLET | Refills: 3 | Status: SHIPPED | OUTPATIENT
Start: 2023-09-26 | End: 2024-04-25

## 2023-09-26 RX ORDER — BACILLUS COAGULANS 1B CELL
1 CAPSULE ORAL EVERY OTHER DAY
Qty: 60 TABLET | Refills: 4 | Status: SHIPPED | OUTPATIENT
Start: 2023-09-26

## 2023-09-26 RX ORDER — DIAZEPAM 5 MG
5-10 TABLET ORAL
Status: CANCELLED | OUTPATIENT
Start: 2023-09-26

## 2023-09-26 NOTE — Clinical Note
Patient asked me to reach out to you to ask if you would be willing to increase the frequency of her dilaudid for a short period of time to get through a pain flair.   Kirk Burger, Pharm. D., [facility] Med[facility] Management Pharmacist

## 2023-09-26 NOTE — Clinical Note
"Recommended To-Do List      Prepared on: 09/28/2023       You can get the best results from your medications by completing the items on this \"To-Do List.\"      Bring your To-Do List when you go to your doctor. And, share it with your family or caregivers.    My To-Do List:  What we talked about: What I should do:   Your medication dosage being too low    Increase your dosage of Semaglutide-Weight Management (WEGOVY)          What we talked about: What I should do:   A new medication that may be of benefit to you    Start taking RAMELTEON          What we talked about: What I should do:   Your medication dosage being too low    Increase how often you take acetaminophen (TYLENOL)          What we talked about: What I should do:   An issue with your medication    Decrease your dosage of tiZANidine (ZANAFLEX)          What we talked about: What I should do:                     "

## 2023-09-26 NOTE — PROGRESS NOTES
Medication Therapy Management (MTM) Encounter    ASSESSMENT:                            Medication Adherence/Access: No issues identified    Pain:   Since she has tried other muscle relaxers and tizanidine has worked partially she should try taking a lower dose of tizanidine to see if a lower dose would decrease her side effects. Could even decrease further by cutting a 2 mg tablet in half. Could also benefit from increased dose of acetaminophen     Hyperhidrosis:  I am unfamiliar with what medications could be used for hyperhidrosis. Antidepressants such as duloxetine can also cause hyperhidrosis.     Sleep:  Could try ramelteon if she would like to avoid ambien.     Weight Loss/Prediabetes:  Discussed that for weight loss continuing to increase Wegovy would be beneficial.     PLAN:                            Try 2 mg of tizanidine to see if this will decrease your side effects. Can decrease further to 1 mg if needed.   Antidepressants can cause excessive sweating in some cases. Discuss with your doctor about a trial off of your duloxetine.   I will message Dr. Martinez to see if they would be willing to increase the frequency of the dilaudid for 2 weeks.   Increase acetaminophen to 1000 mg three times a day   Trial ramelteon 8 mg nightly for sleep.   Will send refill of iron  Increase WeGovy to 1 mg once weekly.    Follow-up: Return in about 1 month (around 10/26/2023).    SUBJECTIVE/OBJECTIVE:                          Ada Welch is a 52 year old female called for a follow-up visit from 7/13/22.       Reason for visit: medication questions.    Allergies/ADRs: Reviewed in chart  Past Medical History: Reviewed in chart  Tobacco: She reports that she quit smoking about 9 years ago. Her smoking use included cigarettes. She started smoking about 32 years ago. She has a 2.00 pack-year smoking history. She has never used smokeless tobacco.  Alcohol: not discussed    Medication Adherence/Access: no issues  "reported    Pain:   Tizanidine 4 mg  - reports sometimes this will make her \"super super cotton mouthed and dizzy\". Sometimes even just 1 tablet will do this.   Medical cannabis - reports this doesn't really help with her pain much but just relaxes her.   Hydroxyzine 25 mg was very helpful for flares and body spasticity. Reports this medication was life changing for her.   Dilaudid 2 mg as needed about once per day - works when she takes it but it doesn't last all day- reports she went to the ER and they stated she needed it more than once per day. Reports she doesn't want to be on it for a long time.   Butrans 10 mcg every 7 days - reports it mostly gets rid of the \"guitar string feeling\" in the back of her legs.  Tramadol 50 mg as needed- takes during the opposite part of the day as the Dilaudid   Duloxetine 60 mg in the am and 30 mg in the pm  Acetaminophen 500 mg three times a day   Ibuprofen 600 mg daily  Lidocaine patches 1-2 per day. Reports she wears for about 8 hours per day.     Has tried gabapentin, Lyrica, amitriptyline (weight gain) and methadone before.     Reports her prognosis is that her pain is going to get progressively worse. Reports due to her pain she is averaging about 3 hours of sleep per night.     Cyclobenzaprine (reports this worked some but lost efficacy over time), methocarbamol (did not work for her) Soma, baclofen have already been tried. Reports the last 3 weeks the pain has been more out of control.     Hyperhidrosis:  Guanfacine 1 mg reports it worked at first but stopped working  Was on salt tablets and this caused her to \"dry out\" too much.     Reports it is not like normal sweat it is like water is \"pouring out\" of her skin even when she is not doing anything.     Sleep:  Zolpidem 5 mg nightly as needed sleep - reports she is taking this about once every 3-4 days. Reports it is helpful when she takes it.    Doxepin and amitriptyline was not helpful.     Weight " "Loss/Prediabetes:  Wegovy 0.5 mg weekly    Reports she has \"terrible exercise intolerance\" Reports that she has \"dialed\" her diet back a decent amount and went to a more plant based diet.     Most recent weight: 197 lb       Today's Vitals: LMP  (LMP Unknown)   ----------------      I spent 50 minutes with this patient today. All changes were made via collaborative practice agreement with Ellyn Rivera MD. A copy of the visit note was provided to the patient's provider(s).    A summary of these recommendations was sent via [employer].    Kirk Burger, Pharm. D., [facility]  Medication Therapy Management Pharmacist      Telemedicine Visit Details  Type of service:  Telephone visit  Start Time:  207 pm  End Time:  257 pm       Medication Therapy Recommendations  BMI 31.0-31.9,adult    Current Medication: Semaglutide-Weight Management (WEGOVY) 0.5 MG/0.5ML pen (Discontinued)   Rationale: Dose too low - Dosage too low - Effectiveness   Recommendation: Increase Dose   Status: Accepted per CPA         Insomnia, unspecified type    Current Medication: zolpidem (AMBIEN) 5 MG tablet   Rationale: Synergistic therapy - Needs additional medication therapy - Indication   Recommendation: Start Medication - RAMELTEON   Status: Accepted per CPA         Pain    Current Medication: acetaminophen (TYLENOL) 500 MG tablet   Rationale: Frequency inappropriate - Dosage too low - Effectiveness   Recommendation: Increase Frequency   Status: Accepted - no CPA Needed          Current Medication: tiZANidine (ZANAFLEX) 4 MG tablet   Rationale: Undesirable effect - Adverse medication event - Safety   Recommendation: Decrease Dose   Status: Accepted - no CPA Needed            "

## 2023-09-26 NOTE — Clinical Note
September 28, 2023  Ada Welch  [address] [address]    Dear Ms. Welch, [facility]     Thank you for talking with me on Sep 26, 2023 about your health and medications. As a follow-up to our conversation, I have included two documents:      1. Your Recommended To-Do List has steps you should take to get the best results from your medications.  2. Your Medication List will help you keep track of your medications and how to take them.    If you want to talk about these documents, please call Kirk Burger RPH at phone: [phone], Monday-Friday 8-4:30pm.    I look forward to working with you and your doctors to make sure your medications work well for you.    Sincerely,  Kirk Burger RPH  [facility] Pharmacist, [facility]

## 2023-09-26 NOTE — Clinical Note
Medication List        Prepared on: 09/28/2023     Bring your Medication List when you go to the doctor, hospital, or   emergency room. And, share it with your family or caregivers.     Note any changes to how you take your medications.  Cross out medications when you no longer use them.    Medication How I take it Why I use it Prescriber   acetaminophen (TYLENOL) 500 MG tablet Take 1,000 mg by mouth every 6 hours as needed for mild pain   Patient Reported   atorvastatin (LIPITOR) 20 MG tablet Take 1 tablet (20 mg) by mouth daily Mixed Hyperlipidemia Ellyn Rivera MD   Botulinum Toxin Type A (BOTOX) 200 units injection 155 Units   Intractable chronic migraine without aura and without status migrainosus Noemy Nguyen MD   Botulinum Toxin Type A (BOTOX) 200 units injection 155 Units   Intractable chronic migraine without aura and without status migrainosus Noemy Nguyen MD   buprenorphine (BUTRANS) 10 MCG/HR WK patch Place 1 patch onto the skin every 7 days Postlaminectomy syndrome Faiza Martinez MD   butalbital-acetaminophen-caffeine (ESGIC) -40 MG tablet Take 1 tablet by mouth every 6 hours as needed for headaches Intractable chronic migraine without aura and without status migrainosus; Cervical Radiculopathy Faiza Martinez MD   cetirizine (ZYRTEC) 10 MG tablet Take 10 mg by mouth daily as needed    Patient Reported   co-enzyme Q-10 100 MG CAPS capsule Take 100 mg by mouth daily   Patient Reported   DULoxetine (CYMBALTA) 30 MG capsule TAKE 2 CAPSULES BY MOUTH IN THE AM AND 1 CAPSULES AT BEDTIME Lumbar Post-Laminectomy Syndrome Faiza Martinez MD   EPINEPHrine (ANY BX GENERIC EQUIV) 0.3 MG/0.3ML injection 2-pack Inject 0.3 mLs (0.3 mg) into the muscle as needed for anaphylaxis May repeat one time in 5-15 minutes if response to initial dose is inadequate. Allergic reaction, sequela Ellyn Rivera MD   ferrous fumarate 65 mg, [place]. FE,-Vitamin C 125 mg (VITRON-C)  MG TABS tablet  Take 1 tablet by mouth every other day   Ellyn Rivera MD   fluticasone (FLONASE) 50 MCG/ACT nasal spray Spray 2 sprays into both nostrils daily Call clinic to schedule follow up appointment. Chronic Rhinitis Ellyn Rivera MD   guanFACINE (TENEX) 1 MG tablet TAKE ONE TAB BY MOUTH ONCE NIGHTLY AT BEDTIME   Patient Reported   HYDROmorphone (DILAUDID) 2 MG tablet Take 1 tablet (2 mg) by mouth daily as needed for pain Failed back surgical syndrome Faiza Martinez MD   hydrOXYzine (ATARAX) 25 MG tablet Take 1 tablet (25 mg) by mouth every 6 hours as needed for anxiety Anxiety Ellyn Rivera MD   ibuprofen ([employer] IBUPROFEN) 200 MG capsule Take 600 mg by mouth every 4 hours as needed for fever   Patient Reported   ivabradine (CORLANOR) 7.5 MG tablet Take 1 tablet (7.5 mg) by mouth 2 times daily Autonomic dysfunction Kirk Russell MD   Lidocaine (LIDOCARE) 4 % Patch Place 1-3 patches onto the skin every 24 hours To prevent lidocaine toxicity, patient should be patch free for 12 hrs daily. Gastroesophageal reflux disease with esophagitis Alana RAMIREZ MD   medical cannabis (Patient's own supply.  Not a prescription) Take 1 Dose by mouth See Admin Instructions Capsule formulation - uses as needed   Entered By History Unknown   metoclopramide (REGLAN) 10 MG tablet Take 1 tablet (10 mg) by mouth 4 times daily as needed (headache) Intractable chronic migraine without aura and without status migrainosus Noemy Nguyen MD   Multiple Vitamins-Minerals (MULTIVITAMIN PO) Take 1 tablet by mouth daily    Patient Reported   naloxone (NARCAN) 4 MG/0.1ML nasal spray Spray 1 spray (4 mg) into one nostril alternating nostrils once as needed for opioid reversal every 2-3 minutes until assistance arrives Postlaminectomy Syndrome of Lumbar Region Faiza Martinez MD   Probiotic Product (PROBIOTIC DAILY PO) Take 6 packets by mouth every morning Five-Lac   Patient Reported   propranolol ER (INDERAL LA) 120 MG 24 hr capsule Take 1  capsule (120 mg) by mouth three times a week Autonomic dysfunction; Postural Orthostatic Tachycardia Syndrome; Sinus Tachycardia Milena Lund PA-C   ramelteon (ROZEREM) 8 MG tablet Take 1 tablet (8 mg) by mouth At Bedtime Insomnia, unspecified type Ellyn Rivera MD   rizatriptan (MAXALT-MLT) 5 MG ODT TAKE 1-2 TABLETS (5-10 MG) BY MOUTH AT ONSET OF HEADACHE FOR MIGRAINE (MAX 30 MG IN 24 HOURS) Intractable chronic migraine without aura and without status migrainosus Noemy Nguyen MD   Semaglutide-Weight Management (WEGOVY) 0.5 MG/0.5ML pen Inject 0.5 mg Subcutaneous once a week BMI 32.0-32.9,Adult; Other Abnormal Glucose Ellyn Rivera MD   Semaglutide-Weight Management (WEGOVY) 1 MG/0.5ML pen Inject 1 mg Subcutaneous once a week BMI 31.0-31.9,Adult Ellyn Rivera MD   SUMAtriptan (IMITREX STATDOSE) 6 MG/0.5ML pen injector kit Inject 0.5 ml (6 mg) subcutaneously at onset of migraine, May repeat in 1 hour , Max 12 mg/24 hrs Intractable chronic migraine without aura and without status migrainosus Noemy Nguyen MD   tiZANidine (ZANAFLEX) 4 MG tablet Take 1 tablet (4 mg) by mouth 4 times daily as needed for muscle spasms Post Laminectomy Syndrome Faiza Martinez MD   traMADol (ULTRAM) 50 MG tablet Take 1 tablet (50 mg) by mouth every 6 hours as needed for severe pain Postlaminectomy syndrome Faiza Martinez MD   vitamin B complex with vitamin C (STRESS TAB) tablet Take 1 tablet by mouth daily   Patient Reported   VITAMIN D, CHOLECALCIFEROL, PO Take 2,000 Units by mouth daily   Entered By History Unknown   zolpidem (AMBIEN) 5 MG tablet Take 1 tablet (5 mg) by mouth nightly as needed for sleep (avoid daily use, do not take with opioids) Call for refills Insomnia, unspecified type Ellyn Rivera MD         Add new medications, over-the-counter drugs, herbals, vitamins, or  minerals in the blank rows below.    Medication How I take it Why I use it Prescriber                                      Allergies:       baclofen; bee venom; chicken-derived products (egg); compazine [prochlorperazine]; food; gabapentin; gluten meal; pregabalin; seasonal allergies; topiramate        Side effects I have had:               Other Information:              My notes and questions:

## 2023-09-27 ENCOUNTER — TELEPHONE (OUTPATIENT)
Dept: INTERNAL MEDICINE | Facility: CLINIC | Age: 53
End: 2023-09-27
Payer: COMMERCIAL

## 2023-09-27 ENCOUNTER — TELEPHONE (OUTPATIENT)
Dept: ANESTHESIOLOGY | Facility: CLINIC | Age: 53
End: 2023-09-27
Payer: COMMERCIAL

## 2023-09-27 DIAGNOSIS — T78.40XS ALLERGIC REACTION, SEQUELA: Primary | ICD-10-CM

## 2023-09-27 PROBLEM — M54.16 LUMBAR RADICULOPATHY, CHRONIC: Status: ACTIVE | Noted: 2023-09-26

## 2023-09-27 RX ORDER — EPINEPHRINE 0.3 MG/.3ML
0.3 INJECTION SUBCUTANEOUS PRN
Qty: 2 EACH | Refills: 1 | Status: SHIPPED | OUTPATIENT
Start: 2023-09-27

## 2023-09-27 NOTE — TELEPHONE ENCOUNTER
Ada is requesting a new Rx for her Epipen      Thank You,  Anne Garcia, [facility], [place]

## 2023-09-27 NOTE — TELEPHONE ENCOUNTER
Patient is scheduled for surgery with Dr. Martinez    Spoke with: patient    Date of Surgery: ina 11/30/23    [place] [facility]    Informed patient they will need an adult  yes    Additional comments: Patient is aware of date and time of the procedure.        Kerry Meyer MA on 9/27/2023 at 8:19 AM

## 2023-09-28 NOTE — PATIENT INSTRUCTIONS
"Recommendations from today's MTM visit:                                                         Try 2 mg of tizanidine to see if this will decrease your side effects. Can decrease further to 1 mg if needed.   Antidepressants can cause excessive sweating in some cases. Discuss with your doctor about a trial off of your duloxetine.   I will message Dr. Martinez to see if they would be willing to increase the frequency of the dilaudid for 2 weeks.   Increase acetaminophen to 1000 mg three times a day   Trial ramelteon 8 mg nightly for sleep.   Will send refill of iron  Increase WeGovy to 1 mg once weekly.    Follow-up: Return in about 1 month (around 10/26/2023).    It was great speaking with you today.  I value your experience and would be very thankful for your time in providing feedback in our clinic survey. In the next few days, you may receive an email or text message from [employer] with a link to a survey related to your  clinical pharmacist.\"     To schedule another MTM appointment, please call the clinic directly or you may call the MTM scheduling line at [phone] or toll-free at [phone].     My Clinical Pharmacist's contact information:                                                      Please feel free to contact me with any questions or concerns you have.      Kirk Burger, Pharm. D., [facility]  Medication Therapy Management Pharmacist     "

## 2023-10-07 ENCOUNTER — HEALTH MAINTENANCE LETTER (OUTPATIENT)
Age: 53
End: 2023-10-07

## 2023-10-24 ENCOUNTER — VIRTUAL VISIT (OUTPATIENT)
Dept: PHARMACY | Facility: CLINIC | Age: 53
End: 2023-10-24
Payer: COMMERCIAL

## 2023-10-24 DIAGNOSIS — R52 PAIN: ICD-10-CM

## 2023-10-24 DIAGNOSIS — G47.00 INSOMNIA, UNSPECIFIED TYPE: ICD-10-CM

## 2023-10-24 PROCEDURE — 99207 PR NO CHARGE LOS: CPT | Performed by: PHARMACIST

## 2023-10-24 NOTE — PROGRESS NOTES
"Medication Therapy Management (MTM) Encounter    ASSESSMENT:                            Medication Adherence/Access: No issues identified    Pain:   Side effects improved. Continue with lower dose of tizanidine.      Sleep:  Slightly improved     Weight Loss/Prediabetes:  Weight continuing to decrease. Will continue to increase Wegovy.    PLAN:                            Increase Wegovy to 1.7 mg weekly    Follow-up: Return in about 4 weeks (around 11/21/2023).      SUBJECTIVE/OBJECTIVE:                          Ada Welch is a 52 year old female called for a follow-up visit from 9/26.       Reason for visit: 1 month follow-up.    Allergies/ADRs: Reviewed in chart  Past Medical History: Reviewed in chart  Tobacco: She reports that she quit smoking about 9 years ago. Her smoking use included cigarettes. She started smoking about 32 years ago. She has a 2.00 pack-year smoking history. She has never used smokeless tobacco.  Alcohol: not discussed    Medication Adherence/Access: no issues reported    Pain:   Tizanidine 2 mg  - reports sometimes this will make her \"super super cotton mouthed and dizzy\". Reducing the dose of this has helped with the side effects. Would like to keep the 4 mg tablet for now since they split \"super easy\"  Medical cannabis - reports this doesn't really help with her pain much but just relaxes her muscles.    Hydroxyzine 25 mg was very helpful for flares and body spasticity. Reports this medication was life changing for her.   Dilaudid 2 mg as needed about once per day - works when she takes it but it doesn't last all day - trying to be careful when she uses it. Seeing Dr. Martinez on Thursday  Butrans 10 mcg every 7 days - reports it mostly gets rid of the \"guitar string feeling\" in the back of her legs.  Tramadol 50 mg as needed- takes during the opposite part of the day as the Dilaudid   Duloxetine 60 mg in the am and 30 mg in the pm - has not discussed the sweating issue with her PCP " "yet  Acetaminophen 500 mg three times a day  - reports that she has \"kidney pain\" if she takes too much of this. The 1000 mg three times a day was too much. May try this again at some point.  Ibuprofen 600 mg daily  Lidocaine patches 1-2 per day. Reports she wears for about 8 hours per day.      Has tried gabapentin, Lyrica, amitriptyline (weight gain) and methadone before. Cyclobenzaprine (reports this worked some but lost efficacy over time), methocarbamol (did not work for her) Soma, baclofen have already been tried. Reports the last 3 weeks the pain has been more out of control.      Reports her prognosis is that her pain is going to get progressively worse. Reports due to her pain she is averaging about 3 hours of sleep per night.      Sleep:  Zolpidem 5 mg nightly as needed sleep - hasn't been using at all since starting ramelteon.   Tried Ramelteon 8 mg daily and it was helpful at first then didn't work as much the rest of the time.   Did not notice any side effects. Reports she is out of the medication,,     Doxepin and amitriptyline were not helpful.      Weight Loss/Prediabetes:  Wegovy 1 mg weekly - reports no big side effects. First 3-4 days after injection she notices the reduced appetite     Reports she has \"terrible exercise intolerance\" Reports that she has \"dialed\" her diet back a decent amount and went to a more plant based diet.      Most recent weight: 192 lb   Starting weight: 210 lb    Today's Vitals: LMP  (LMP Unknown)   ----------------      I spent 14 minutes with this patient today. All changes were made via collaborative practice agreement with Ellyn Rivera MD. A copy of the visit note was provided to the patient's provider(s).    A summary of these recommendations was sent via [employer].    Kirk Burger, Pharm. D., [facility]  Medication Therapy Management Pharmacist      Telemedicine Visit Details  Type of service:  Telephone visit  Start Time:  830 am  End Time: 845 am AM       Medication " Therapy Recommendations  Obese    Current Medication: Semaglutide-Weight Management (WEGOVY) 1 MG/0.5ML pen (Discontinued)   Rationale: Dose too low - Dosage too low - Effectiveness   Recommendation: Increase Dose   Status: Accepted per CPA

## 2023-10-26 ENCOUNTER — ANCILLARY PROCEDURE (OUTPATIENT)
Dept: GENERAL RADIOLOGY | Facility: CLINIC | Age: 53
End: 2023-10-26
Attending: PEDIATRICS
Payer: COMMERCIAL

## 2023-10-26 ENCOUNTER — ANCILLARY PROCEDURE (OUTPATIENT)
Dept: CT IMAGING | Facility: CLINIC | Age: 53
End: 2023-10-26
Attending: PEDIATRICS
Payer: COMMERCIAL

## 2023-10-26 DIAGNOSIS — M54.2 CERVICALGIA: ICD-10-CM

## 2023-10-26 DIAGNOSIS — M54.40 LOW BACK PAIN OF MULTIPLE SITES OF SPINE WITH SCIATICA: ICD-10-CM

## 2023-10-26 DIAGNOSIS — R55 PRE-SYNCOPE: ICD-10-CM

## 2023-10-26 PROCEDURE — 72202 X-RAY EXAM SI JOINTS 3/> VWS: CPT | Performed by: RADIOLOGY

## 2023-10-26 PROCEDURE — 72100 X-RAY EXAM L-S SPINE 2/3 VWS: CPT | Performed by: STUDENT IN AN ORGANIZED HEALTH CARE EDUCATION/TRAINING PROGRAM

## 2023-10-26 PROCEDURE — 70496 CT ANGIOGRAPHY HEAD: CPT | Mod: GC | Performed by: RADIOLOGY

## 2023-10-26 PROCEDURE — 70498 CT ANGIOGRAPHY NECK: CPT | Mod: GC | Performed by: RADIOLOGY

## 2023-10-26 PROCEDURE — 72050 X-RAY EXAM NECK SPINE 4/5VWS: CPT | Mod: GC | Performed by: RADIOLOGY

## 2023-10-26 RX ORDER — IOPAMIDOL 755 MG/ML
70 INJECTION, SOLUTION INTRAVASCULAR ONCE
Status: COMPLETED | OUTPATIENT
Start: 2023-10-26 | End: 2023-10-26

## 2023-10-26 RX ADMIN — IOPAMIDOL 70 ML: 755 INJECTION, SOLUTION INTRAVASCULAR at 12:02

## 2023-10-26 NOTE — DISCHARGE INSTRUCTIONS

## 2023-10-26 NOTE — PATIENT INSTRUCTIONS
"Recommendations from today's MTM visit:                                                    MTM (medication therapy management) is a service provided by a clinical pharmacist designed to help you get the most of out of your medicines.   Today we reviewed what your medicines are for, how to know if they are working, that your medicines are safe and how to make your medicine regimen as easy as possible.    Increase Wegovy to 1.7 mg weekly    Follow-up: Return in about 4 weeks (around 11/21/2023).    It was great speaking with you today.  I value your experience and would be very thankful for your time in providing feedback in our clinic survey. In the next few days, you may receive an email or text message from [employer] with a link to a survey related to your  clinical pharmacist.\"     To schedule another MTM appointment, please call the clinic directly or you may call the MTM scheduling line at [phone] or toll-free at [phone].     My Clinical Pharmacist's contact information:                                                      Please feel free to contact me with any questions or concerns you have.      Kirk Burger, Pharm. D., [facility]  Medication Therapy Management Pharmacist    "

## 2023-10-27 DIAGNOSIS — M96.1 POST LAMINECTOMY SYNDROME: ICD-10-CM

## 2023-10-27 DIAGNOSIS — M96.1 POSTLAMINECTOMY SYNDROME: ICD-10-CM

## 2023-10-30 RX ORDER — BUPRENORPHINE 10 UG/H
1 PATCH TRANSDERMAL
Qty: 4 PATCH | Refills: 1 | Status: SHIPPED | OUTPATIENT
Start: 2023-10-30 | End: 2024-01-17

## 2023-10-31 ENCOUNTER — OFFICE VISIT (OUTPATIENT)
Dept: NEUROLOGY | Facility: CLINIC | Age: 53
End: 2023-10-31
Payer: COMMERCIAL

## 2023-10-31 DIAGNOSIS — G43.719 INTRACTABLE CHRONIC MIGRAINE WITHOUT AURA AND WITHOUT STATUS MIGRAINOSUS: Primary | ICD-10-CM

## 2023-10-31 PROCEDURE — 64615 CHEMODENERV MUSC MIGRAINE: CPT | Performed by: PSYCHIATRY & NEUROLOGY

## 2023-10-31 NOTE — PROGRESS NOTES
BRYCE Caballero KINA Rebekah MRN# [id number]   Age: 51 year old YOB: 1970      Botulinum Toxin Procedure Note     Requesting physician: Noemy Nguyen MD  Last office visit: 5/17/2022 Dr Nguyen, 12/20/22 Dr Domingo. Scheduled to be seen 2/19/24 for next appt with Dr Nguyen    The procedure was explained to the patient. Benefits of the treatment were discussed including headache and migraine reduction. Risks of the procedure were reviewed including but not limited to pain, bruising, bleeding, infection, weakness of muscles injected or those distal to injection. The patient voiced understanding of the risks and benefits. All questions answered and the patient consented to proceed.      Prior to receiving Botox injections the patient reported the following:  Baseline headache frequency: > 15 days/month  Baseline headache duration: > 4 hours  Associated migrainous features: wavy vision     Previous treatment trials: amitriptyline, zonisamide, gabapentin, Aimovig, Emgality, duloxetine, tizanidine     Last Botox procedure: 7/11/23  Current migraine frequency: She has noted some pulling  in the head wondering if Botox made neck too weak because of her EDS.   Overlal thought she reports 50% reduction in overall migraines until it wore off.     Functional improvements: half the headache days, less days in bed. Can turn head more and be able to drive in a safer manner.      A 200 unit vial of Botox was diluted with 4ml of 0.9% sodium chloride     Botox lot # and expiration date: See MAR for details  0.9% sodium chloride lot # and expiration date: See MAR for details     The following muscles were injected using a 30 gauge needle:  Procerus 1 site 5 units   2 sites (bilat) 10 units  Frontalis 4 sites (bilat) 20 units  Temporalis 8 sites (bilat) 40 units  **Trapezius muscles 4 sites (bilat) 10 units (lateral and reduced)  Occipitalis 8 sites (bilat) 40 units  **Splenius capitus 2 sites  (bilat) 10 units (lateral and reduced)  Masseter 2 sites (bilat) 20 units     A total of 155 units were injected, 45 units wasted.   The patient tolerated the injections well. I was present for and performed the entire procedure.      Noemy Nguyen MD

## 2023-10-31 NOTE — LETTER
10/31/2023       RE: Ada Welch  [address] [address]     Dear Colleague,    Thank you for referring your patient, Ada Welch, to the [facility] [place] [facility]. Please see a copy of my visit note below.          Physicians     Ada Welch MRN# [id number]   Age: 51 year old YOB: 1970      Botulinum Toxin Procedure Note     Requesting physician: Noemy Nguyen MD  Last office visit: 5/17/2022 Dr Nguyen, 12/20/22 Dr Domingo. Scheduled to be seen 2/19/24 for next appt with Dr Nguyen    The procedure was explained to the patient. Benefits of the treatment were discussed including headache and migraine reduction. Risks of the procedure were reviewed including but not limited to pain, bruising, bleeding, infection, weakness of muscles injected or those distal to injection. The patient voiced understanding of the risks and benefits. All questions answered and the patient consented to proceed.      Prior to receiving Botox injections the patient reported the following:  Baseline headache frequency: > 15 days/month  Baseline headache duration: > 4 hours  Associated migrainous features: wavy vision     Previous treatment trials: amitriptyline, zonisamide, gabapentin, Aimovig, Emgality, duloxetine, tizanidine     Last Botox procedure: 7/11/23  Current migraine frequency: She has noted some pulling  in the head wondering if Botox made neck too weak because of her EDS.   Overlal thought she reports 50% reduction in overall migraines until it wore off.     Functional improvements: half the headache days, less days in bed. Can turn head more and be able to drive in a safer manner.      A 200 unit vial of Botox was diluted with 4ml of 0.9% sodium chloride     Botox lot # and expiration date: See MAR for details  0.9% sodium chloride lot # and expiration date: See MAR for details     The following  muscles were injected using a 30 gauge needle:  Procerus 1 site 5 units   2 sites (bilat) 10 units  Frontalis 4 sites (bilat) 20 units  Temporalis 8 sites (bilat) 40 units  **Trapezius muscles 4 sites (bilat) 10 units (lateral and reduced)  Occipitalis 8 sites (bilat) 40 units  **Splenius capitus 2 sites (bilat) 10 units (lateral and reduced)  Masseter 2 sites (bilat) 20 units     A total of 155 units were injected, 45 units wasted.   The patient tolerated the injections well. I was present for and performed the entire procedure.             Again, thank you for allowing me to participate in the care of your patient.      Sincerely,    Noemy Nguyen MD

## 2023-11-13 ENCOUNTER — HOSPITAL ENCOUNTER (EMERGENCY)
Facility: CLINIC | Age: 53
Discharge: HOME | End: 2023-11-13
Payer: COMMERCIAL

## 2023-11-14 ENCOUNTER — MYC MEDICAL ADVICE (OUTPATIENT)
Dept: NEUROLOGY | Facility: CLINIC | Age: 53
End: 2023-11-14
Payer: COMMERCIAL

## 2023-11-14 DIAGNOSIS — M54.12 CERVICAL RADICULOPATHY: ICD-10-CM

## 2023-11-14 DIAGNOSIS — G43.719 INTRACTABLE CHRONIC MIGRAINE WITHOUT AURA AND WITHOUT STATUS MIGRAINOSUS: ICD-10-CM

## 2023-11-16 RX ORDER — METHYLPREDNISOLONE 4 MG
TABLET, DOSE PACK ORAL
Qty: 21 TABLET | Refills: 0 | Status: SHIPPED | OUTPATIENT
Start: 2023-11-16 | End: 2024-06-25

## 2023-11-16 RX ORDER — BUTALBITAL, ACETAMINOPHEN AND CAFFEINE 50; 325; 40 MG/1; MG/1; MG/1
1 TABLET ORAL EVERY 6 HOURS PRN
Qty: 10 TABLET | Refills: 0 | Status: SHIPPED | OUTPATIENT
Start: 2023-11-16 | End: 2024-05-03

## 2023-11-19 DIAGNOSIS — G43.719 INTRACTABLE CHRONIC MIGRAINE WITHOUT AURA AND WITHOUT STATUS MIGRAINOSUS: Primary | ICD-10-CM

## 2023-11-21 ENCOUNTER — VIRTUAL VISIT (OUTPATIENT)
Dept: PHARMACY | Facility: CLINIC | Age: 53
End: 2023-11-21
Payer: COMMERCIAL

## 2023-11-21 DIAGNOSIS — E66.9 OBESITY, UNSPECIFIED CLASSIFICATION, UNSPECIFIED OBESITY TYPE, UNSPECIFIED WHETHER SERIOUS COMORBIDITY PRESENT: Primary | ICD-10-CM

## 2023-11-21 DIAGNOSIS — R73.03 PREDIABETES: ICD-10-CM

## 2023-11-21 PROCEDURE — 99207 PR NO CHARGE LOS: CPT | Performed by: PHARMACIST

## 2023-11-21 NOTE — PROGRESS NOTES
"Medication Therapy Management (MTM) Encounter    ASSESSMENT:                            Medication Adherence/Access: No issues identified    Weight loss/prediabetes:   Patient is doing well on the 1.7 mg dose would benefit from increasing to the 2.4 mg dose as a final dose and stay on this dose until she would like to try a taper off.  Based on current height and weight her BMI would be 28.7    PLAN:                            Increase wegovy to 2.4 mg once weekly    Follow-up: Return in about 13 weeks (around 2/20/2024) for Follow up, with me, using a phone visit.    SUBJECTIVE/OBJECTIVE:                          Ada Welch is a 52 year old female called for a follow-up visit from 10/24.       Reason for visit: Wegovy follow-up.    Allergies/ADRs: Reviewed in chart  Past Medical History: Reviewed in chart  Tobacco: She reports that she quit smoking about 9 years ago. Her smoking use included cigarettes. She started smoking about 32 years ago. She has a 2.00 pack-year smoking history. She has never used smokeless tobacco.  Alcohol: Not discussed    Medication Adherence/Access: no issues reported      Weight Loss/Prediabetes:  Wegovy 1.7 mg weekly - reports that she gets nauseas every now and that she does get increased energy. Reports she feels really good about this. Reports she is having less pain now too. Reports once she achieves her goal weight she would like to try and go off it. Would potentially like to stay on for prediabetes control as well.     Report things have been very busy lately moving in to a new home. Also has been stressful as both her and daughter have been having migraines and they aren't sure where this is coming from.      Reports sthat morning routine she has started is to do Yoga and stretching for a half hour and then clean for an hour.      Most recent weight: 183 lb   Starting weight: 210 lb    No results found for: \"UMALCR\"   Lab Results   Component Value Date    A1C 5.9 06/12/2023 " "   A1C  02/17/2023      Comment:      The previously reported result may be inaccurate due to a laboratory instrument calibration issue. Providers should order a new test to be performed if clinically indicated.  [employer] will issue a credit for this test.  This is a corrected result. Previous result was 6.4 % on 2/17/2023 at 12:01 PM CST         Estimated body mass index is 31.01 kg/m  as calculated from the following:    Height as of 9/15/23: 5' 7\" (1.702 m).    Weight as of 9/21/23: 198 lb (89.8 kg).    Complete \"Weight Managment Plan\" in the progress note from the Adult Preventative or Medicare smartsets, use phrase .WEIGHTPLAN, or choose an option from Weight Management Resources smartset below.        Today's Vitals: LMP  (LMP Unknown)   ----------------      I spent 8 minutes with this patient today. All changes were made via collaborative practice agreement with Ellyn Rivera MD. A copy of the visit note was provided to the patient's provider(s).    A summary of these recommendations was sent via [employer].    Kirk Burger, Pharm. D., BCACP  Medication Therapy Management Pharmacist      Telemedicine Visit Details  Type of service:  Telephone visit  Start Time:  1010am  End Time: 10:18 AM     Medication Therapy Recommendations  BMI 31.0-31.9,adult    Current Medication: Semaglutide-Weight Management (WEGOVY) 1.7 MG/0.75ML pen (Discontinued)   Rationale: Dose too low - Dosage too low - Effectiveness   Recommendation: Increase Dose   Status: Accepted per CPA            "

## 2023-11-21 NOTE — PATIENT INSTRUCTIONS
"Recommendations from today's MTM visit:                                                    MTM (medication therapy management) is a service provided by a clinical pharmacist designed to help you get the most of out of your medicines.   Today we reviewed what your medicines are for, how to know if they are working, that your medicines are safe and how to make your medicine regimen as easy as possible.    Increase wegovy to 2.4 mg once weekly    Follow-up: Return in about 13 weeks (around 2/20/2024) for Follow up, with me, using a phone visit.    It was great speaking with you today.  I value your experience and would be very thankful for your time in providing feedback in our clinic survey. In the next few days, you may receive an email or text message from [employer] with a link to a survey related to your  clinical pharmacist.\"     To schedule another MTM appointment, please call the clinic directly or you may call the MTM scheduling line at [phone] or toll-free at [phone].     My Clinical Pharmacist's contact information:                                                      Please feel free to contact me with any questions or concerns you have.      Kirk Burger, Pharm. D., [facility]  Medication Therapy Management Pharmacist    "

## 2023-11-30 ENCOUNTER — HOSPITAL ENCOUNTER (OUTPATIENT)
Facility: AMBULATORY SURGERY CENTER | Age: 53
Discharge: HOME | End: 2023-11-30
Attending: ANESTHESIOLOGY | Admitting: ANESTHESIOLOGY
Payer: COMMERCIAL

## 2023-11-30 ENCOUNTER — ANCILLARY PROCEDURE (OUTPATIENT)
Dept: RADIOLOGY | Facility: AMBULATORY SURGERY CENTER | Age: 53
End: 2023-11-30
Attending: ANESTHESIOLOGY
Payer: COMMERCIAL

## 2023-11-30 VITALS
DIASTOLIC BLOOD PRESSURE: 89 MMHG | RESPIRATION RATE: 16 BRPM | TEMPERATURE: 97.6 F | SYSTOLIC BLOOD PRESSURE: 132 MMHG | OXYGEN SATURATION: 99 % | HEART RATE: 74 BPM

## 2023-11-30 DIAGNOSIS — M54.16 LUMBAR RADICULOPATHY, CHRONIC: ICD-10-CM

## 2023-11-30 DIAGNOSIS — M96.1 POSTLAMINECTOMY SYNDROME: ICD-10-CM

## 2023-11-30 DIAGNOSIS — M96.1 POSTLAMINECTOMY SYNDROME, LUMBAR REGION: ICD-10-CM

## 2023-11-30 DIAGNOSIS — M54.16 LUMBAR RADICULOPATHY: ICD-10-CM

## 2023-11-30 DIAGNOSIS — Q79.60 EHLERS-DANLOS SYNDROME: ICD-10-CM

## 2023-11-30 DIAGNOSIS — G57.03 PIRIFORMIS SYNDROME OF BOTH SIDES: Primary | ICD-10-CM

## 2023-11-30 PROCEDURE — 62323 NJX INTERLAMINAR LMBR/SAC: CPT

## 2023-11-30 RX ORDER — LIDOCAINE HYDROCHLORIDE 10 MG/ML
INJECTION, SOLUTION EPIDURAL; INFILTRATION; INTRACAUDAL; PERINEURAL DAILY PRN
Status: DISCONTINUED | OUTPATIENT
Start: 2023-11-30 | End: 2023-11-30 | Stop reason: HOSPADM

## 2023-11-30 RX ORDER — TRAMADOL HYDROCHLORIDE 50 MG/1
50 TABLET ORAL EVERY 6 HOURS PRN
Qty: 65 TABLET | Refills: 3 | Status: SHIPPED | OUTPATIENT
Start: 2023-11-30 | End: 2024-03-14

## 2023-11-30 RX ORDER — METHYLPREDNISOLONE ACETATE 40 MG/ML
INJECTION, SUSPENSION INTRA-ARTICULAR; INTRALESIONAL; INTRAMUSCULAR; SOFT TISSUE DAILY PRN
Status: DISCONTINUED | OUTPATIENT
Start: 2023-11-30 | End: 2023-11-30 | Stop reason: HOSPADM

## 2023-11-30 RX ORDER — DIAZEPAM 5 MG
5-10 TABLET ORAL
Status: CANCELLED | OUTPATIENT
Start: 2023-11-30

## 2023-11-30 RX ORDER — BUPIVACAINE HYDROCHLORIDE 2.5 MG/ML
INJECTION, SOLUTION EPIDURAL; INFILTRATION; INTRACAUDAL DAILY PRN
Status: DISCONTINUED | OUTPATIENT
Start: 2023-11-30 | End: 2023-11-30 | Stop reason: HOSPADM

## 2023-11-30 RX ORDER — DIAZEPAM 5 MG
5-10 TABLET ORAL
Status: COMPLETED | OUTPATIENT
Start: 2023-11-30 | End: 2023-11-30

## 2023-11-30 RX ORDER — IOPAMIDOL 408 MG/ML
INJECTION, SOLUTION INTRATHECAL DAILY PRN
Status: DISCONTINUED | OUTPATIENT
Start: 2023-11-30 | End: 2023-11-30 | Stop reason: HOSPADM

## 2023-11-30 RX ADMIN — DIAZEPAM 10 MG: 5 TABLET ORAL at 11:14

## 2023-11-30 NOTE — OP NOTE
Patient: Ada Welch Age: 52 year old   MRN: [id number] Attending: Dr. Martinez     Date of Visit: November 30, 2023      PAIN MEDICINE CLINIC PROCEDURE NOTE    ATTENDING CLINICIAN:    Faiza Martinez MD    ASSISTANT CLINICIAN:  RAFAL Olivarez     PREPROCEDURE DIAGNOSES:  1.  Lumbar radiculopathy  2.  Chronic low back pain       PROCEDURE(S) PERFORMED:  1.  Interlaminar lumbar epidural steroid injection (L3/4)   2.  Fluoroscopic guidance for the above-named procedure(s)      ANESTHESIA:  Local.    INDICATIONS:  Ada Welch is a 52 year old female with a history of  chronic low back pain secondary to bilateral lumbosacral radiculopathy .  The patient stated that the patient was in their usual state of health and denied recent anticoagulant use or recent infections.  Therefore, the plan is to perform above mentioned procedures.     Procedure Details:  The patient was met in the procedure room, where the patient was identified by name, medical record number and date of birth.  All of the patient s last minute questions were answered. Written informed consent was obtained and saved in the electronic medical record, after the risks, benefits, and alternatives were discussed with the patient.      A formal time-out procedure was performed, as per protocol, including patient name, title of procedure, and site of procedure, and all in the room concurred.  Routine monitors were applied.      The patient was placed in the prone position on the procedure room table.  All pressure points were checked and comfortably padded.  Routine monitors were placed.  Vital signs were stable.    A chlorhexidine prep was completed followed by sterile draping per standard procedure.     The AP fluoroscopic view was optimized for approach at  L3/4 interspace.  The skin over the interspace was infiltrated with 4-5 mL of 1% Lidocaine using a 25 gauge, 1.5 inch needle.  A 20-gauge 3-1/2 inch Tuohy needle was advanced under fluoroscopic  guidance with left paramedial approach until it touched lamina. Mutiple AP and lateral fluoroscopic images at this time  are taken as Tuohy needle was advanced to the epidural space. The epidural space was identified, without evidence of blood, cerebrospinal fluid, or parasthesia throughout. Needle tip placement within the epidural space was further confirmed with 1-2 mL of nonionic contrast agent, with the epidural space visualized in the AP and lateral fluoroscopic view(s) with appropriate spread of the agent with fat vacuolization and no intravascular or intrathecal spread noted. Next, 8 mL of a treatment solution containing 2 mL of preservative free 0.25% bupivacaine, 1 mL of Depo-Medrol 40 mg/mL and 5 mL preservative free normal saline was administered slowly. The needle was withdrawn.      Light pressure was held at the puncture site(s) to prevent ecchymosis and oozing.  The patient's skin was cleansed, and hemostasis was confirmed.  Band-aids were applied to the needle injection site(s).      Condition:    The patient remained awake and alert throughout the procedure.  The patient tolerated the procedure well and was monitored for approximately 15 minutes afterward in the post procedure area.  There were no immediate post procedure complications noted.  The patient was then discharged to home as per protocol.      Pre-procedure pain score: 7/10  Post-procedure pain score: 9/10

## 2023-11-30 NOTE — DISCHARGE INSTRUCTIONS
Home Care Instructions after an Epidural Steroid Pain Injection    A lumbar epidural steroid injection delivers steroid medication directly into the area that may be causing your lower back pain and/or leg pain. A cervical or thoracic epidural steroid injection delivers steroids into the epidural space surrounding spinal nerve roots to help relieve pain in the upper spine/neck.    Activity  -Rest today  -Do not work today  -Resume normal activity tomorrow  -DO NOT shower for 24 hours  -DO NOT remove bandaid for 24 hours    Pain  -You may experience soreness at the injection site for one or two days  -You may use an ice pack for 20 minutes every 2 hours for the first 24 hours  -You may use a heating pad after the first 24 hours  -You may use Tylenol (acetaminophen) every 4 hours or other pain medicines as     directed by your physician    You may experience numbness radiating into your legs or arms (depending on the procedure location). This numbness may last several hours. Until sensation returns to normal; please use caution in walking, climbing stairs, and stepping out of your vehicle, etc.    Common side effects of steroids:  Not everyone will experience corticosteroid side effects. If side effects are experienced, they will gradually subside in the 7-10 day period following an injection. Most common side effects include:  -Flushed face and/or chest  -Feeling of warmth, particularly in the face but could be an overall feeling of warmth  -Increased blood sugar in diabetic patients  -Menstrual irregularities my occur. If taking hormone-based birth control an alternate method of birth control is recommended  -Sleep disturbances and/or mood swings are possible  -Leg cramps    Please contact us if you have:  -Severe pain  -Fever more than 101.5 degrees Fahrenheit  -Signs of infection at the injection site (redness, swelling, or drainage)    FOR PAIN CENTER PATIENTS:  If you have questions, please contact the Pain  Clinic at [phone] Option #1 between the hours of 7:00 am and 3:00 pm Monday through Friday. After office hours you can contact the on call provider by dialing [phone]. If you need immediate attention, we recommend that you go to a hospital emergency room or dial [phone].      FOR PM&R PATIENTS:  For patients seen by the PM and R service, please call [phone]. (Monday through Friday 8a-5p.  After business hours and weekends call [phone] and ask for the PM and R doctor on call). If you need to fax a pain diary to PM&R the fax number is [phone]. If you are unable to fax, uploading to [employer] is encouraged, then send to provider. If you have procedure scheduling questions please call [phone] Option #2.

## 2023-12-04 ENCOUNTER — TELEPHONE (OUTPATIENT)
Dept: ANESTHESIOLOGY | Facility: CLINIC | Age: 53
End: 2023-12-04
Payer: COMMERCIAL

## 2023-12-04 ENCOUNTER — MYC MEDICAL ADVICE (OUTPATIENT)
Dept: ANESTHESIOLOGY | Facility: CLINIC | Age: 53
End: 2023-12-04
Payer: COMMERCIAL

## 2023-12-04 ENCOUNTER — HOSPITAL ENCOUNTER (OUTPATIENT)
Facility: AMBULATORY SURGERY CENTER | Age: 53
End: 2023-12-04
Attending: ANESTHESIOLOGY
Payer: COMMERCIAL

## 2023-12-04 NOTE — TELEPHONE ENCOUNTER
Patient is scheduled for surgery with Dr. Martinez    Spoke with: Patient    Date of Surgery: 01/04/24    Location: [facility]    Informed patient they will need an adult  yes    Additional comments: Patient is aware of date and time of the procedure.        Kerry Meyer MA on 12/4/2023 at 10:04 AM

## 2023-12-12 ENCOUNTER — THERAPY VISIT (OUTPATIENT)
Dept: PHYSICAL THERAPY | Facility: CLINIC | Age: 53
End: 2023-12-12
Payer: COMMERCIAL

## 2023-12-12 DIAGNOSIS — Q79.60 EDS (EHLERS-DANLOS SYNDROME): Primary | ICD-10-CM

## 2023-12-12 PROCEDURE — 97110 THERAPEUTIC EXERCISES: CPT | Mod: GP | Performed by: PHYSICAL THERAPIST

## 2023-12-12 PROCEDURE — 97162 PT EVAL MOD COMPLEX 30 MIN: CPT | Mod: GP | Performed by: PHYSICAL THERAPIST

## 2023-12-12 NOTE — PROGRESS NOTES
"PHYSICAL THERAPY EVALUATION  Type of Visit: Evaluation    See electronic medical record for Abuse and Falls Screening details.    Subjective       Pt is a 54 yo female presenting with muscle tightness and impaired posture secondary to EDS. Feels that her muscles are very tight, no success with muscle relaxers and they cause side effects, feels her posture is not aligned, and overall feels very rigid. Does take opioids (dilaudid, tramadol) and medical marijuana for pain. Also doing pool therapy at [facility]. Main goal is to focus on posture, strength, and muscle tightness in order to improve total body pain/soreness and reduce \"flare ups\" (Looking for someone to do myofascial release).  Has multiple important events/trips coming up in the next year and wants to be able to do them without pain/fatigue.   Presenting condition or subjective complaint: I need help getting my body aligned, exercises that encourage myofacial release, help getting muscles to release so that i can get a thorough stretch h  Date of onset: 11/30/23    Relevant medical history: Anemia; Arthritis; Bladder or bowel problems; Chest pain; Dizziness; Menopause; Migraines or headaches; Non-healing wounds; Numbness or tingling in perianal area; Ongoing fever or chills; Overweight; Pain at night or rest; Severe dizziness; Severe headaches; Sleep disorder like apnea; Thyroid problems; Vision problems   Dates & types of surgery: Too many to list. They should all be current in my MyChart  Past Surgical History:   Procedure Laterality Date    AS REPAIR OF NASAL SEPTUM  2009    repair broke nose    BACK SURGERY  12/09/2013  10/01/2014    Spinal fusion L4-S1, L5 moving 5/8ths in. Remove screws/rods, Dr. Vega    BIOPSY  01/01/2008 & 3/2017    mole biopsy, lymph node biopsy    COLONOSCOPY  03/2017    Colonoscopy and GI    ESOPHAGEAL IMPEDENCE FUNCTION TEST WITH 24 HOUR PH GREATER THAN 1 HOUR N/A 09/17/2019    Procedure: IMPEDANCE PH STUDY, ESOPHAGUS, 24 " HOUR;  Surgeon: Raghavendra Grande MD;  Location: [facility]    ESOPHAGOSCOPY, GASTROSCOPY, DUODENOSCOPY (EGD), COMBINED N/A 08/03/2018    Procedure: COMBINED ESOPHAGOSCOPY, GASTROSCOPY, DUODENOSCOPY (EGD), BIOPSY SINGLE OR MULTIPLE;;  Surgeon: Juan Woodson MD;  Location: [facility]    ESOPHAGOSCOPY, GASTROSCOPY, DUODENOSCOPY (EGD), COMBINED N/A 10/22/2018    Procedure: COMBINED ESOPHAGOSCOPY, GASTROSCOPY, DUODENOSCOPY (EGD), BIOPSY SINGLE OR MULTIPLE;  Surgeon: Sadia Carolina MD;  Location: [facility]    ESOPHAGOSCOPY, GASTROSCOPY, DUODENOSCOPY (EGD), COMBINED N/A 12/17/2018    Procedure: COMBINED ESOPHAGOSCOPY, GASTROSCOPY, DUODENOSCOPY (EGD);  Surgeon: Juan Woodson MD;  Location: [facility]    ESOPHAGOSCOPY, GASTROSCOPY, DUODENOSCOPY (EGD), COMBINED N/A 10/14/2022    Procedure: ESOPHAGOGASTRODUODENOSCOPY, WITH BIOPSY;  Surgeon: Jay Shaffer MD;  Location: [facility]    INJECT BLOCK MEDIAL BRANCH CERVICAL/THORACIC/LUMBAR Left 09/24/2020    Procedure: BLOCK, NERVE, FACET JOINT, MEDIAL BRANCH, DIAGNOSTIC;  Surgeon: Faiza Martinez MD;  [facility]    INJECT BLOCK MEDIAL BRANCH CERVICAL/THORACIC/LUMBAR Bilateral 10/08/2020    Procedure: Bilateral cervical 3, cervical 4, and cervical 5 medial branch nerve block;  Surgeon: Faiza Martinez MD;  Location: [facility]    INJECT BLOCK MEDIAL BRANCH CERVICAL/THORACIC/LUMBAR Right 03/04/2021    Procedure: Cervical 3, cervical 4, cervical 5 medial branch nerve blocks to target C3-C4 and C4-C5 facet joint;  Surgeon: Faiza Martinez MD;  Location: [facility]    INJECT EPIDURAL CERVICAL N/A 10/27/2022    Procedure: INJECTION, SPINE, CERVICAL, EPIDURAL, C7-T1;  Surgeon: Faiza Martinez MD;  Location: [facility]    INJECT EPIDURAL CERVICAL Bilateral 03/30/2023    Procedure: INJECTION, SPINE, CERVICAL C7-T1, EPIDURAL;  Surgeon: Faiza Martinez MD;  Location: [facility]    INJECT EPIDURAL LUMBAR Left 06/08/2023    Procedure: INJECTION, SPINE, LUMBAR, EPIDURAL;  Surgeon: Faiza Martinez MD;  Location:  [facility]    INJECT EPIDURAL LUMBAR Bilateral 11/30/2023    Procedure: Interlaminar lumbar epidural steroid injection (L3-L4);  Surgeon: Faiza Martinez MD;  Location: [facility]    INJECT JOINT SACROILIAC Bilateral 06/08/2023    Procedure: INJECT JOINT SACROILIAC (bilateral);  Surgeon: Faiza Martinez MD;  Location: [facility]    LAPAROSCOPIC HERNIORRHAPHY HIATAL N/A 02/10/2020    Procedure: LAPAROSCOPIC HIATAL HERNIA REPAIR, NISSEN FUNDOPLICATION, ESOPHOGASTRODUODENOSCOPY, PEG TUBE PLACEMENT.;  Surgeon: Alana Mack MD;  Location: [facility]    NASAL/SINUS POLYPECTOMY  2017    Polyps were benign    NOSE SURGERY  2007    deviated septum    TONSILLECTOMY & ADENOIDECTOMY  1981    TUBAL LIGATION  07/01/2011       Prior diagnostic imaging/testing results: MRI; CT scan; X-ray; EMG; Video fluoroscopic swallow study     Prior therapy history for the same diagnosis, illness or injury: Yes Pt, massage, myofacial release, pool therapy    Prior Level of Function  Transfers: Independent  Ambulation: Independent  ADL: Independent      Living Environment  Social support: With family members   Type of home: [facility]   Stairs to enter the home: Yes 3 Is there a railing: No   Ramp: No   Stairs inside the home: Yes 30 Is there a railing: Yes   Help at home: None  Equipment owned: Straight Cane; Crutches; Standard wheelchair; Grab bars     Employment: No    Hobbies/Interests: Drawing, reading, walking, anything outside, being active with my family,woodworking,painting, swimming, does restorative yoga class    Patient goals for therapy: Be more active, have leas down/flare days, sleep better    Pain assessment:  full body pain, but pt specifically mentions neck, shoulder, pelvis and thoracic pain     Objective   Cognitive Status Examination  Orientation: Oriented to person, place and time   Level of Consciousness: Alert  Follows Commands and Answers Questions: 100% of the time  Personal Safety and Judgement: Intact  Memory: Intact      POSTURE:    Standing posture:  R knee hyperextension  B ER of feet and arch collapse   Excess lumbar lordosis, forward head     Pelvis:  R iliac crest superior and anterior    PALPATION:  tenderness over iliac crest, ASIS  Quad and hamstring: myofacial tightness and tenderness noted    RANGE OF MOTION: not tested due to nature of EDS     STRENGTH:   Deep neck flexor endurance: 10 sec    BED MOBILITY: Independent    TRANSFERS: Independent but slow, appears painful    GAIT:   Gait Description: slowed gait speed, wide ANGELI, feet ER, stiff trunk    BALANCE:     SPECIAL TESTS      10 Meter Walk Test (Comfortable)  10.3 (0.58 m/s)                           Assessment & Plan   CLINICAL IMPRESSIONS  Medical Diagnosis: EDS    Treatment Diagnosis: impaired posture, muscle tightness and pain   Impression/Assessment: Patient is a 53 year old female with impaired posture, muscle tightness, and pain complaints.  The following significant findings have been identified: Pain, Impaired gait, Decreased activity tolerance, and Impaired posture. These impairments interfere with their ability to perform recreational activities, household chores, household mobility, and community mobility as compared to previous level of function.     Clinical Decision Making (Complexity):  Clinical Presentation: Evolving/Changing  Clinical Presentation Rationale: based on medical and personal factors listed in PT evaluation  Clinical Decision Making (Complexity): Moderate complexity    PLAN OF CARE  Treatment Interventions:  Interventions: Gait Training, Manual Therapy, Neuromuscular Re-education, Therapeutic Activity, Therapeutic Exercise    Long Term Goals     PT Goal 1  Goal Identifier: gait speed  Goal Description: Pt will increase gait speed by 0.12 m/s in order to increase ease of community mobility  Goal Progress: eval: 0.58 m/s  Target Date: 03/10/24  PT Goal 2  Goal Identifier: Deep neck flexor endurance  Goal Description: Pt will increase deep neck  flexor endurance by 10 seconds in order to decrease pain and improve posture  Goal Progress: eval: 10 sec  Target Date: 03/10/24  PT Goal 3  Goal Identifier: HEP  Goal Description: Pt will be independent with HEP in order to imrpove self management of symptoms  Goal Progress: in progress  Target Date: 03/10/24      Frequency of Treatment: 1x/week  Duration of Treatment: 90 days    Recommended Referrals to Other Professionals:  EDS orthotist  Education Assessment:   Learner/Method: Patient;Demonstration;Pictures/Video  Education Comments: POC, HEP    Risks and benefits of evaluation/treatment have been explained.   Patient/Family/caregiver agrees with Plan of Care.     Evaluation Time:     PT Tony, Moderate Complexity Minutes (09227): 30       Signing Clinician: Anamaria Acevedo, PT      [facility]                                                                                   OUTPATIENT PHYSICAL THERAPY      PLAN OF TREATMENT FOR OUTPATIENT REHABILITATION   Patient's Last Name, First Name, Ada Nash YOB: 1970   Provider's Name   [facility]   Medical Record No.  [id number]     Onset Date: 11/30/23  Start of Care Date: 12/12/23     Medical Diagnosis:  EDS      PT Treatment Diagnosis:  impaired posture, muscle tightness and pain Plan of Treatment  Frequency/Duration: 1x/week/ 90 days    Certification date from 12/12/23 to 03/10/24         See note for plan of treatment details and functional goals     Anamaria Acevedo, PT                         I CERTIFY THE NEED FOR THESE SERVICES FURNISHED UNDER        THIS PLAN OF TREATMENT AND WHILE UNDER MY CARE     (Physician attestation of this document indicates review and certification of the therapy plan).              Referring Provider:  Ellyn Rivera    Initial Assessment  See Epic Evaluation- Start of Care Date: 12/12/23

## 2023-12-15 ENCOUNTER — OFFICE VISIT (OUTPATIENT)
Dept: INTERNAL MEDICINE | Facility: CLINIC | Age: 53
End: 2023-12-15
Payer: COMMERCIAL

## 2023-12-15 ENCOUNTER — LAB (OUTPATIENT)
Dept: LAB | Facility: CLINIC | Age: 53
End: 2023-12-15
Payer: COMMERCIAL

## 2023-12-15 VITALS
HEART RATE: 59 BPM | DIASTOLIC BLOOD PRESSURE: 80 MMHG | OXYGEN SATURATION: 96 % | SYSTOLIC BLOOD PRESSURE: 117 MMHG | BODY MASS INDEX: 28.04 KG/M2 | WEIGHT: 179 LBS

## 2023-12-15 DIAGNOSIS — K02.9 DENTAL CARIES: ICD-10-CM

## 2023-12-15 DIAGNOSIS — R79.89 ABNORMAL TSH: ICD-10-CM

## 2023-12-15 DIAGNOSIS — Z23 NEED FOR PROPHYLACTIC VACCINATION AGAINST HEPATITIS A: ICD-10-CM

## 2023-12-15 DIAGNOSIS — M96.1 POSTLAMINECTOMY SYNDROME, LUMBAR REGION: ICD-10-CM

## 2023-12-15 DIAGNOSIS — Z11.59 NEED FOR HEPATITIS C SCREENING TEST: Primary | ICD-10-CM

## 2023-12-15 DIAGNOSIS — R73.01 IMPAIRED FASTING GLUCOSE: ICD-10-CM

## 2023-12-15 DIAGNOSIS — Z11.59 NEED FOR HEPATITIS C SCREENING TEST: ICD-10-CM

## 2023-12-15 DIAGNOSIS — F41.9 ANXIETY: ICD-10-CM

## 2023-12-15 DIAGNOSIS — Z12.4 CERVICAL CANCER SCREENING: ICD-10-CM

## 2023-12-15 DIAGNOSIS — E56.9 VITAMIN DEFICIENCY: ICD-10-CM

## 2023-12-15 DIAGNOSIS — Z00.00 HEALTH CARE MAINTENANCE: ICD-10-CM

## 2023-12-15 DIAGNOSIS — Z12.31 VISIT FOR SCREENING MAMMOGRAM: ICD-10-CM

## 2023-12-15 DIAGNOSIS — H53.9 VISION CHANGES: ICD-10-CM

## 2023-12-15 DIAGNOSIS — Z23 NEED FOR PROPHYLACTIC VACCINATION AGAINST HEPATITIS B VIRUS: ICD-10-CM

## 2023-12-15 LAB
ALBUMIN SERPL BCG-MCNC: 4.9 G/DL (ref 3.5–5.2)
ALP SERPL-CCNC: 83 U/L (ref 40–150)
ALT SERPL W P-5'-P-CCNC: 23 U/L (ref 0–50)
ANION GAP SERPL CALCULATED.3IONS-SCNC: 12 MMOL/L (ref 7–15)
AST SERPL W P-5'-P-CCNC: 26 U/L (ref 0–45)
BILIRUB SERPL-MCNC: 0.6 MG/DL
BUN SERPL-MCNC: 10.7 MG/DL (ref 6–20)
CALCIUM SERPL-MCNC: 9.5 MG/DL (ref 8.6–10)
CHLORIDE SERPL-SCNC: 101 MMOL/L (ref 98–107)
CREAT SERPL-MCNC: 0.73 MG/DL (ref 0.51–0.95)
DEPRECATED HCO3 PLAS-SCNC: 25 MMOL/L (ref 22–29)
EGFRCR SERPLBLD CKD-EPI 2021: >90 ML/MIN/1.73M2
ERYTHROCYTE [DISTWIDTH] IN BLOOD BY AUTOMATED COUNT: 11.6 % (ref 10–15)
FERRITIN SERPL-MCNC: 145 NG/ML (ref 11–328)
GLUCOSE SERPL-MCNC: 86 MG/DL (ref 70–99)
HBA1C MFR BLD: 5.3 %
HCT VFR BLD AUTO: 41.7 % (ref 35–47)
HGB BLD-MCNC: 14.5 G/DL (ref 11.7–15.7)
MCH RBC QN AUTO: 30.8 PG (ref 26.5–33)
MCHC RBC AUTO-ENTMCNC: 34.8 G/DL (ref 31.5–36.5)
MCV RBC AUTO: 89 FL (ref 78–100)
PLATELET # BLD AUTO: 263 10E3/UL (ref 150–450)
POTASSIUM SERPL-SCNC: 4 MMOL/L (ref 3.4–5.3)
PROT SERPL-MCNC: 7.6 G/DL (ref 6.4–8.3)
RBC # BLD AUTO: 4.71 10E6/UL (ref 3.8–5.2)
SODIUM SERPL-SCNC: 138 MMOL/L (ref 135–145)
TSH SERPL DL<=0.005 MIU/L-ACNC: 1.9 UIU/ML (ref 0.3–4.2)
VIT D+METAB SERPL-MCNC: 33 NG/ML (ref 20–50)
WBC # BLD AUTO: 5.5 10E3/UL (ref 4–11)

## 2023-12-15 PROCEDURE — 85027 COMPLETE CBC AUTOMATED: CPT | Performed by: PATHOLOGY

## 2023-12-15 PROCEDURE — 99215 OFFICE O/P EST HI 40 MIN: CPT | Performed by: INTERNAL MEDICINE

## 2023-12-15 PROCEDURE — 99000 SPECIMEN HANDLING OFFICE-LAB: CPT | Performed by: PATHOLOGY

## 2023-12-15 PROCEDURE — 84443 ASSAY THYROID STIM HORMONE: CPT | Performed by: PATHOLOGY

## 2023-12-15 PROCEDURE — 82306 VITAMIN D 25 HYDROXY: CPT | Performed by: INTERNAL MEDICINE

## 2023-12-15 PROCEDURE — 83036 HEMOGLOBIN GLYCOSYLATED A1C: CPT | Performed by: INTERNAL MEDICINE

## 2023-12-15 PROCEDURE — 36415 COLL VENOUS BLD VENIPUNCTURE: CPT | Performed by: PATHOLOGY

## 2023-12-15 PROCEDURE — 82728 ASSAY OF FERRITIN: CPT | Performed by: PATHOLOGY

## 2023-12-15 PROCEDURE — 86803 HEPATITIS C AB TEST: CPT | Performed by: INTERNAL MEDICINE

## 2023-12-15 PROCEDURE — 80053 COMPREHEN METABOLIC PANEL: CPT | Performed by: PATHOLOGY

## 2023-12-15 RX ORDER — HYDROXYZINE HYDROCHLORIDE 25 MG/1
25 TABLET, FILM COATED ORAL EVERY 6 HOURS PRN
Qty: 180 TABLET | Refills: 1 | Status: SHIPPED | OUTPATIENT
Start: 2023-12-15 | End: 2024-03-20

## 2023-12-15 RX ORDER — CELECOXIB 100 MG/1
100 CAPSULE ORAL 2 TIMES DAILY PRN
Qty: 60 CAPSULE | Refills: 2 | Status: SHIPPED | OUTPATIENT
Start: 2023-12-15 | End: 2024-04-24

## 2023-12-15 NOTE — PROGRESS NOTES
"Ada is a 53 year old that presents in clinic today for the following:     Chief Complaint   Patient presents with    Follow Up     Pt here for follow up and wants to discuss recent steroid injection complications (injection site redness)           12/15/2023     9:55 AM   Additional Questions   Roomed by MJL, EMT       Screenings from encounters over the past 10 days    No data recorded       Royal Porter at 9:59 AM on 12/15/2023    History of Present Illness:  Ms. Welch is a 53 year old female who presents for  Chief Complaint   Patient presents with    Follow Up     Pt here for follow up and wants to discuss recent steroid injection complications (injection site redness)    Referral     Pt is requesting referral for vision and dental       Tifdara is here today to follow-up on weight, pain     2 new grandkids, closed on new house in [facility], in good place with Flak[place], living with Radha    Started Wegovy, 0.25 in July, recently up to 0.5 mg  206 lbs to 180 lbs, up to 2.4 mg  Trying to eat   No side effects, feels better overall  May try Berberine supplements for weight    11/23 got SUNSHINE L3-4, helped with radicular pain  Still has a lower lumbar and thoracic \"hot spot\"  Uses THC balm, getting muscle relaxer from [employer]  Starting PT, Teena Boothe for fitness/aquatic, [place] as well  States hydroxyzine is helping significantly for anxiety around pain flares     Previously, we stopped amitriptyline given weight gain  Replaced with doxepin, still struggling with sleep  Now on remelton per PharmD  Given recent relationship struggles, she started seeing a therapist for anxiety, wondering about other anxiety drugs  [facility]ing [facility]     Past Documentation:  She is having a pain flare lately, got an SUNSHINE recently 6/8, still having a lot of pain, using tizanidine and tramadol, she got a short term rx for dilaudid, using cannabis as well  Considering repeat fusion     Detailed Medical History:  -EDS-Type 3 " Hypermobility. 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 He reports having a Beighton score of 7-8 out of 9. She reports having genetic testing done, which was questionable for the MYLK Gene. She previously followed with a rheumatologist who is an EDS specialist in [place].  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 Russell, has ILR. Ivadrabine and propronolol helping.  -Cervical instability, chiari malformation:  Saw Dr. Moncada, cervical fusion not recommended, considering occipital blocks. Also traveled to [facility] to see Dr. Andrey Sofia.  -Chronic headaches: Daily tension, cervicogenic, migraine, intracranial hypotension. Has not responded well to injections. Hospitalized in 2017 with normal LP and MRI. At that time responded to DHA.  Had blood patch done 3/19 with good relief of pain. Previously failed amovig and emgality. Blood patch test repeated 9/19. Now managing with medical management, Uses fioricet,maxalt, aleve, tizanidine. Botox.  -Hiatal hernia: Recently saw Dr. Mack and had manometry testing done in Feb 2019 which revealed lower esoph pressure, hiatal hernia, possibly short esophagus, peristalsis was preserved. S/p Nissen surgery 2/20.  -Eosinophilic esophagitis: EGD 12/18 performed for food impaction showed ring at GEJ and mucosal changes c/w EoE. Biopsies 8/18 positive for Eos.  -Lumbar pain: She underwent L4 to S1 fusion in 2013 followed by removal of instrumentation, which did not improve her symptoms. She met with Dr. Martinez in the pain clinic and was taken off high doses of Dilaudid and started on Suboxone in 2017, which dramatically improved her symptoms.  She continues to work with physical therapy twice a week as well as other complementary therapies.   -Cervical pain, no surgical targets per neurosurgery 2023  -IBS, constipation: GES showed rapid emptying 10/17. XR video swallow showed no aspiration 10/17.  -Asthma  -Enlarged cervical lymph nodes: L level 2 node biopsied  10/18 with scant cellularity. Biopsied in past and benign with neg flow cytometry.  -Urinary urge incontinence/incomplete emptying  -Pelvic floor dysfunction, rectocele, [facility]  -Chronic Pain: Has regimen of medical cannabis, tramadol, butran, cymbalta and tizanidine. Uses medical cannabis since 2018 which helps. Follows with Dr. Martinez.        Routine Health Maintenence:  Depression Screening:   PHQ-2 Score:           2/23/2023     8:24 AM 7/14/2022     9:12 AM   PHQ-2 ( 1999 [employer])   Q1: Little interest or pleasure in doing things 0 0   Q2: Feeling down, depressed or hopeless 0 0   PHQ-2 Score 0 0         Immunizations (zoster, pneumovax, flu, Tdap, Hep A/B):   There is no immunization history on file for this patient.  Lipids:       Recent Labs   Lab Test 04/26/18  1010   CHOL 244*   HDL 48*   *   TRIG 156*         Lung Ca Screening (>30 pk age 55-79 or >20 py age 50-79 + RF): n/a low pack years  Colonoscopy (50-75 yrs): 3/17 , due 2027   - One small hyperplastic polyp in the sigmoid colon, removed with a cold snare. Resected and retrieved. Diverticulosis in the sigmoid colon. Repeat colonoscopy in10 years for surveillance   Dexa (>65W or 70M yrs): n/a  Mammogram (40-75 yrs): 1/18 ordered, 6/22, ordered  Pap (21-65 yrs): 1/18 HPV neg, due 1/23  HIV/HCV if risk factors:  HIV neg 4/18  Tob/EtOH: screen neg  Lifestyle factors: reviewed  Advanced Directive: deferred                                A detailed Review of Systems was performed, verified and is negative except as documented in the HPI.  All health questionnaires were reviewed, verified and relevant information documented above.    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    Cervicalgia     Chronic sinusitis 00/2007    Diagnosed with chronic pansinusitis 2016    Coagulation disorder (H24)     factor 8    CSF leak     Chiari malformation    Depression     Tom-Danlos disease     Eosinophilic esophagitis 08/2018    Gastroesophageal reflux disease 3/1/2017    I have large hiatal hernia    Hiatal hernia 2016    Have adeline hiatel hernia    Hoarseness Unsure    It comes and goes    Hypertension     Hypothyroid     IBS (irritable bowel syndrome) with constipation     improved on Linzess    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    Irregular heart beat     Migraines 1/1/2007    Unsure of exact date    Nasal polyps 2013    Were revoved 03/2017, benign    Orthostatic hypotension     Other nervous system complications 1/2014    Autonomic nervous system disorder, neuralgia, neuropathy    Swelling, mass, or lump in head and neck 08/2016    Lymph node has been growing for last year    Thyroid disease 01/01/2008    Urinary incontinence     Urinary urgency        Past Surgical History:  Past Surgical History:   Procedure Laterality Date    AS REPAIR OF NASAL SEPTUM  2009    repair broke nose    BACK SURGERY  12/09/2013  10/01/2014    Spinal fusion L4-S1, L5 moving 5/8ths in. Remove screws/rods, Dr. Vega    BIOPSY  01/01/2008 & 3/2017    mole biopsy, lymph node biopsy    COLONOSCOPY  03/2017    Colonoscopy and GI    ESOPHAGEAL IMPEDENCE FUNCTION TEST WITH 24 HOUR PH GREATER THAN 1 HOUR N/A 09/17/2019    Procedure: IMPEDANCE PH STUDY, ESOPHAGUS, 24 HOUR;  Surgeon: Raghavendra Grande MD;  Location:  GI    ESOPHAGOSCOPY, GASTROSCOPY, DUODENOSCOPY (EGD), COMBINED N/A 08/03/2018    Procedure: COMBINED ESOPHAGOSCOPY, GASTROSCOPY, DUODENOSCOPY (EGD), BIOPSY SINGLE OR MULTIPLE;;  Surgeon: Juan Woodson MD;  [facility]    ESOPHAGOSCOPY, GASTROSCOPY, DUODENOSCOPY (EGD), COMBINED N/A 10/22/2018     Procedure: COMBINED ESOPHAGOSCOPY, GASTROSCOPY, DUODENOSCOPY (EGD), BIOPSY SINGLE OR MULTIPLE;  Surgeon: Sadia Carolina MD;  [facility]    ESOPHAGOSCOPY, GASTROSCOPY, DUODENOSCOPY (EGD), COMBINED N/A 12/17/2018    Procedure: COMBINED ESOPHAGOSCOPY, GASTROSCOPY, DUODENOSCOPY (EGD);  Surgeon: Juan Woodson MD;  [facility]    ESOPHAGOSCOPY, GASTROSCOPY, DUODENOSCOPY (EGD), COMBINED N/A 10/14/2022    Procedure: ESOPHAGOGASTRODUODENOSCOPY, WITH BIOPSY;  Surgeon: Jay Shaffer MD;  Location: [facility]    INJECT BLOCK MEDIAL BRANCH CERVICAL/THORACIC/LUMBAR Left 09/24/2020    Procedure: BLOCK, NERVE, FACET JOINT, MEDIAL BRANCH, DIAGNOSTIC;  Surgeon: Faiza Martinez MD;  [facility]    INJECT BLOCK MEDIAL BRANCH CERVICAL/THORACIC/LUMBAR Bilateral 10/08/2020    Procedure: Bilateral cervical 3, cervical 4, and cervical 5 medial branch nerve block;  Surgeon: Faiza Martinez MD;  Location: [facility]    INJECT BLOCK MEDIAL BRANCH CERVICAL/THORACIC/LUMBAR Right 03/04/2021    Procedure: Cervical 3, cervical 4, cervical 5 medial branch nerve blocks to target C3-C4 and C4-C5 facet joint;  Surgeon: Faiza Martinez MD;  Location: [facility] OR    INJECT EPIDURAL CERVICAL N/A 10/27/2022    Procedure: INJECTION, SPINE, CERVICAL, EPIDURAL, C7-T1;  Surgeon: Faiza Martinez MD;  Location: [facility] OR    INJECT EPIDURAL CERVICAL Bilateral 03/30/2023    Procedure: INJECTION, SPINE, CERVICAL C7-T1, EPIDURAL;  Surgeon: Faiza Martinez MD;  Location: [facility]    INJECT EPIDURAL LUMBAR Left 06/08/2023    Procedure: INJECTION, SPINE, LUMBAR, EPIDURAL;  Surgeon: Faiza Martinez MD;  Location: [facility]    INJECT EPIDURAL LUMBAR Bilateral 11/30/2023    Procedure: Interlaminar lumbar epidural steroid injection (L3-L4);  Surgeon: Faiza Martinez MD;  Location: [facility]    INJECT JOINT SACROILIAC Bilateral 06/08/2023    Procedure: INJECT JOINT SACROILIAC (bilateral);  Surgeon: Faiza Martinez MD;  Location: [facility]    LAPAROSCOPIC HERNIORRHAPHY HIATAL  N/A 02/10/2020    Procedure: LAPAROSCOPIC HIATAL HERNIA REPAIR, NISSEN FUNDOPLICATION, ESOPHOGASTRODUODENOSCOPY, PEG TUBE PLACEMENT.;  Surgeon: Alana Mack MD;  [facility]    NASAL/SINUS POLYPECTOMY  2017    Polyps were benign    NOSE SURGERY  2007    deviated septum    TONSILLECTOMY & ADENOIDECTOMY  1981    TUBAL LIGATION  07/01/2011       Active Meds:  Current Outpatient Medications   Medication    acetaminophen (TYLENOL) 500 MG tablet    atorvastatin (LIPITOR) 20 MG tablet    buprenorphine (BUTRANS) 10 MCG/HR WK patch    butalbital-acetaminophen-caffeine (ESGIC) -40 MG tablet    cetirizine (ZYRTEC) 10 MG tablet    co-enzyme Q-10 100 MG CAPS capsule    DULoxetine (CYMBALTA) 30 MG capsule    EPINEPHrine (ANY BX GENERIC EQUIV) 0.3 MG/0.3ML injection 2-pack    ferrous fumarate 65 mg, Jackson. FE,-Vitamin C 125 mg (VITRON-C)  MG TABS tablet    fluticasone (FLONASE) 50 MCG/ACT nasal spray    guanFACINE (TENEX) 1 MG tablet    HYDROmorphone (DILAUDID) 2 MG tablet    hydrOXYzine (ATARAX) 25 MG tablet    ibuprofen ([employer] IBUPROFEN) 200 MG capsule    ivabradine (CORLANOR) 7.5 MG tablet    Lidocaine (LIDOCARE) 4 % Patch    medical cannabis (Patient's own supply.  Not a prescription)    methylPREDNISolone (MEDROL DOSEPAK) 4 MG tablet therapy pack    metoclopramide (REGLAN) 10 MG tablet    Multiple Vitamins-Minerals (MULTIVITAMIN PO)    naloxone (NARCAN) 4 MG/0.1ML nasal spray    Probiotic Product (PROBIOTIC DAILY PO)    propranolol ER (INDERAL LA) 120 MG 24 hr capsule    ramelteon (ROZEREM) 8 MG tablet    rizatriptan (MAXALT-MLT) 5 MG ODT    Semaglutide-Weight Management (WEGOVY) 2.4 MG/0.75ML pen    SUMAtriptan (IMITREX STATDOSE) 6 MG/0.5ML pen injector kit    tiZANidine (ZANAFLEX) 4 MG tablet    traMADol (ULTRAM) 50 MG tablet    vitamin B complex with vitamin C (STRESS TAB) tablet    VITAMIN D, CHOLECALCIFEROL, PO    zolpidem (AMBIEN) 5 MG tablet     Current Facility-Administered Medications   Medication     [START ON 1/25/2024] Botulinum Toxin Type A (BOTOX) 200 units injection 155 Units    Botulinum Toxin Type A (BOTOX) 200 units injection 155 Units    Botulinum Toxin Type A (BOTOX) 200 units injection 155 Units        Allergies:  Baclofen, Bee venom, Chicken-derived products (egg), Compazine [prochlorperazine], Food, Gabapentin, Gluten meal, Pregabalin, Seasonal allergies, and Topiramate    Family History:  family history includes Asthma in her sister and son; Cancer in her father; Connective Tissue Disorder in her mother and sister; Diabetes in her maternal grandmother, paternal grandfather, and paternal grandmother; Heart Disease in her maternal grandmother; Hypertension in her maternal grandmother; Migraines in her father, sister, sister, and sister; Thyroid Disease in her sister.    Social History:  Social History     Tobacco Use    Smoking status: Former     Packs/day: 0.20     Years: 10.00     Additional pack years: 0.00     Total pack years: 2.00     Types: Cigarettes     Start date: 1/1/1991     Quit date: 12/1/2013     Years since quitting: 10.0    Smokeless tobacco: Never   Vaping Use    Vaping Use: Never used   Substance Use Topics    Alcohol use: Yes     Comment: once a month    Drug use: Yes     Types: Marijuana     Comment: medical marijuana       Physical Exam:  Vitals: /80 (BP Location: Right arm, Patient Position: Sitting, Cuff Size: Adult Regular)   Pulse 59   Wt 81.2 kg (179 lb)   LMP  (LMP Unknown)   SpO2 96%   BMI 28.04 kg/m    Constitutional: Alert, oriented, pleasant, no acute distress  Head: Normocephalic, atraumatic  Eyes: Extra-ocular movements intact, pupils equally round and reactive bilaterally, no scleral icterus  ENT: Oropharynx clear, moist mucus membranes, good dentition  Musculoskeletal: No edema, normal muscle tone, normal gait, tender in mid thoracic and lower lumbar spine  Neurologic: Alert and oriented, cranial nerves 2-12 intact, grossly non-focal  Skin: No  rashes/lesions  Psychiatric: normal mentation, affect and mood      Diagnostics:  Labs reviewed in Epic          Assessment and Plan:  Ada was seen today for follow up and referral.    Diagnoses and all orders for this visit:    Need for hepatitis C screening test  -     Hepatitis C Screen Reflex to HCV RNA Quant and Genotype; Future    Cervical cancer screening  Will due next visit due 1/24    Visit for screening mammogram  -     Mammogram, screening w rosalio (3D); Future    Dental caries  -     Comprehensive metabolic panel (BMP + Alb, Alk Phos, ALT, AST, Total. Bili, TP); Future  -     Dental Referral; Future    Postlaminectomy syndrome, lumbar region  -     celecoxib (CELEBREX) 100 MG capsule; Take 1 capsule (100 mg) by mouth 2 times daily as needed for moderate pain (with food)    Vision changes  -     Adult Eye  Referral; Future    Abnormal TSH  -     TSH with free T4 reflex; Future    Health care maintenance  -     REVIEW OF HEALTH MAINTENANCE PROTOCOL ORDERS    Anxiety  -     hydrOXYzine HCl (ATARAX) 25 MG tablet; Take 1 tablet (25 mg) by mouth every 6 hours as needed for anxiety    Vitamin deficiency  -     Vitamin D Deficiency **(3 MO); Future  -     Ferritin; Future  -     CBC with platelets; Future    Impaired fasting glucose  -     Hemoglobin A1c; Future            Ellyn Rivera MD  Internal Medicine      >40 minutes spent today performing chart review, history and exam, counseling, care coordination, documentation and further activities as noted above exclusive of any procedures or EKG interpretation

## 2023-12-16 LAB — HCV AB SERPL QL IA: NONREACTIVE

## 2024-01-04 ENCOUNTER — TELEPHONE (OUTPATIENT)
Dept: ANESTHESIOLOGY | Facility: CLINIC | Age: 54
End: 2024-01-04
Payer: COMMERCIAL

## 2024-01-04 NOTE — TELEPHONE ENCOUNTER
Received call from patient that patient is supposed to have her procedure today, but she has a fever and is vomiting.    Date Scheduled: 1-18-24  Facility: Surgery Locations: [facility]  Surgeon: Dr. Martinez   Post-op appointment scheduled:    scheduled?: No  Surgery packet/instructions confirmed received?  Yes  Pre op physical/PAC appointment: n/a  Special Considerations:     Pati Dobson  Surgery Scheduling Coordinator  Ph: [phone]

## 2024-01-14 ENCOUNTER — TELEPHONE (OUTPATIENT)
Dept: INTERNAL MEDICINE | Facility: CLINIC | Age: 54
End: 2024-01-14

## 2024-01-14 NOTE — TELEPHONE ENCOUNTER
Pt has new ins - a secondary ins     Prior Authorization Retail Medication Request    Medication/Dose: Wegovy 2.4mg   Diagnosis and ICD code (if different than what is on RX):    New/renewal/insurance change PA/secondary ins. PA:  Previously Tried and Failed:    Rationale:  ANOREXIC, ANTI-OBESITY NOT COVERED    Insurance   Primary: [facility] [place] part [place]   Insurance ID:  [id number]    Secondary (if applicable): [facility] nd  Insurance ID:  [id number]    Pharmacy Information (if different than what is on RX)  Name:    Phone:    Fax:

## 2024-01-16 DIAGNOSIS — G90.9 AUTONOMIC DYSFUNCTION: ICD-10-CM

## 2024-01-16 DIAGNOSIS — R00.0 SINUS TACHYCARDIA: ICD-10-CM

## 2024-01-16 DIAGNOSIS — G90.A POSTURAL ORTHOSTATIC TACHYCARDIA SYNDROME: ICD-10-CM

## 2024-01-16 DIAGNOSIS — M96.1 POSTLAMINECTOMY SYNDROME: ICD-10-CM

## 2024-01-17 RX ORDER — BUPRENORPHINE 10 UG/H
1 PATCH TRANSDERMAL WEEKLY
Qty: 4 PATCH | Refills: 1 | Status: SHIPPED | OUTPATIENT
Start: 2024-01-17 | End: 2024-04-01

## 2024-01-17 NOTE — TELEPHONE ENCOUNTER
Refill request    Medication: buprenorphine (BUTRANS) 10 MCG/HR WK patch     Sig: Place 1 patch onto the skin every 7 days - Transdermal     Dispensed: 4  Refills: 1  SOLD to the pt on: 10/30/23     Last clinic appointment: 8/10/23  Next clinic appointment: not scheduled     Last Drug Screen Collected: 5/25/23  Controlled Substance Agreement signed: 5/25/23      Preferred pharmacy:    [place], [place] - [address] [place] [address]       Refill request routed to the provider to review.

## 2024-01-19 RX ORDER — PROPRANOLOL HYDROCHLORIDE 120 MG/1
CAPSULE, EXTENDED RELEASE ORAL
Qty: 81 CAPSULE | Refills: 0 | OUTPATIENT
Start: 2024-01-19

## 2024-01-22 NOTE — TELEPHONE ENCOUNTER
Central Prior Authorization Team   Phone: [phone]    PA Initiation    Medication: Wegovy 2.4mg   Insurance Company: [employer] - Phone [phone] Fax [phone]  Pharmacy Filling the Rx: [place], [place] - [address] [place] [address]  Filling Pharmacy Phone: [phone]  Filling Pharmacy Fax:    Start Date: 1/22/2024

## 2024-01-23 NOTE — TELEPHONE ENCOUNTER
PRIOR AUTHORIZATION DENIED    Medication: Wegovy 2.4mg -PA DENIED     Denial Date: 1/23/2024    Denial Rational:           Appeal Information:

## 2024-01-25 ENCOUNTER — TELEPHONE (OUTPATIENT)
Dept: ANESTHESIOLOGY | Facility: CLINIC | Age: 54
End: 2024-01-25

## 2024-01-25 ENCOUNTER — MYC MEDICAL ADVICE (OUTPATIENT)
Dept: ANESTHESIOLOGY | Facility: CLINIC | Age: 54
End: 2024-01-25

## 2024-01-25 ENCOUNTER — MYC REFILL (OUTPATIENT)
Dept: ANESTHESIOLOGY | Facility: CLINIC | Age: 54
End: 2024-01-25

## 2024-01-25 DIAGNOSIS — M96.1 FAILED BACK SURGICAL SYNDROME: ICD-10-CM

## 2024-01-25 DIAGNOSIS — G57.03 PIRIFORMIS SYNDROME OF BOTH SIDES: Primary | ICD-10-CM

## 2024-01-25 DIAGNOSIS — M96.1 POSTLAMINECTOMY SYNDROME: Primary | ICD-10-CM

## 2024-01-25 RX ORDER — CYCLOBENZAPRINE HCL 10 MG
10 TABLET ORAL 3 TIMES DAILY PRN
Qty: 60 TABLET | Refills: 0 | Status: SHIPPED | OUTPATIENT
Start: 2024-01-25 | End: 2024-02-15

## 2024-01-25 RX ORDER — HYDROMORPHONE HYDROCHLORIDE 2 MG/1
2 TABLET ORAL DAILY PRN
Qty: 20 TABLET | Refills: 0 | Status: SHIPPED | OUTPATIENT
Start: 2024-01-25 | End: 2024-02-08

## 2024-01-25 RX ORDER — DIAZEPAM 5 MG
5-10 TABLET ORAL
Status: CANCELLED | OUTPATIENT
Start: 2024-01-25

## 2024-01-29 ENCOUNTER — TELEPHONE (OUTPATIENT)
Dept: ANESTHESIOLOGY | Facility: CLINIC | Age: 54
End: 2024-01-29
Payer: COMMERCIAL

## 2024-01-29 PROBLEM — G57.03 PIRIFORMIS SYNDROME OF BOTH SIDES: Status: ACTIVE | Noted: 2023-11-30

## 2024-01-29 NOTE — TELEPHONE ENCOUNTER
Patient is scheduled for surgery with Dr. Martinez    Spoke with: patient    Date of Surgery: 2/08/24    Location: [facility]    Informed patient they will need an adult  yes    Additional comments: Patient is aware of date and time of the procedure.        Kerry Meyer MA on 1/29/2024 at 4:00 PM

## 2024-02-06 ENCOUNTER — THERAPY VISIT (OUTPATIENT)
Dept: PHYSICAL THERAPY | Facility: CLINIC | Age: 54
End: 2024-02-06
Payer: COMMERCIAL

## 2024-02-06 DIAGNOSIS — Q79.60 EDS (EHLERS-DANLOS SYNDROME): Primary | ICD-10-CM

## 2024-02-06 DIAGNOSIS — M62.81 GENERALIZED MUSCLE WEAKNESS: ICD-10-CM

## 2024-02-06 PROCEDURE — 97140 MANUAL THERAPY 1/> REGIONS: CPT | Mod: GP | Performed by: PHYSICAL THERAPIST

## 2024-02-06 PROCEDURE — 97110 THERAPEUTIC EXERCISES: CPT | Mod: GP | Performed by: PHYSICAL THERAPIST

## 2024-02-08 ENCOUNTER — ANESTHESIA EVENT (OUTPATIENT)
Dept: SURGERY | Facility: AMBULATORY SURGERY CENTER | Age: 54
End: 2024-02-08
Payer: COMMERCIAL

## 2024-02-08 ENCOUNTER — HOSPITAL ENCOUNTER (OUTPATIENT)
Facility: AMBULATORY SURGERY CENTER | Age: 54
Discharge: HOME | End: 2024-02-08
Attending: ANESTHESIOLOGY | Admitting: ANESTHESIOLOGY
Payer: COMMERCIAL

## 2024-02-08 ENCOUNTER — ANESTHESIA (OUTPATIENT)
Dept: SURGERY | Facility: AMBULATORY SURGERY CENTER | Age: 54
End: 2024-02-08
Payer: COMMERCIAL

## 2024-02-08 ENCOUNTER — ANCILLARY PROCEDURE (OUTPATIENT)
Dept: RADIOLOGY | Facility: AMBULATORY SURGERY CENTER | Age: 54
End: 2024-02-08
Attending: ANESTHESIOLOGY
Payer: COMMERCIAL

## 2024-02-08 VITALS
SYSTOLIC BLOOD PRESSURE: 128 MMHG | HEART RATE: 107 BPM | TEMPERATURE: 97.1 F | DIASTOLIC BLOOD PRESSURE: 83 MMHG | OXYGEN SATURATION: 99 % | RESPIRATION RATE: 16 BRPM

## 2024-02-08 DIAGNOSIS — G57.03 PIRIFORMIS SYNDROME OF BOTH SIDES: ICD-10-CM

## 2024-02-08 DIAGNOSIS — M96.1 FAILED BACK SURGICAL SYNDROME: ICD-10-CM

## 2024-02-08 PROCEDURE — 20552 NJX 1/MLT TRIGGER POINT 1/2: CPT

## 2024-02-08 RX ORDER — LIDOCAINE HYDROCHLORIDE 10 MG/ML
INJECTION, SOLUTION EPIDURAL; INFILTRATION; INTRACAUDAL; PERINEURAL PRN
Status: DISCONTINUED | OUTPATIENT
Start: 2024-02-08 | End: 2024-02-08 | Stop reason: HOSPADM

## 2024-02-08 RX ORDER — IOPAMIDOL 408 MG/ML
INJECTION, SOLUTION INTRAVASCULAR PRN
Status: DISCONTINUED | OUTPATIENT
Start: 2024-02-08 | End: 2024-02-08 | Stop reason: HOSPADM

## 2024-02-08 RX ORDER — BUPIVACAINE HYDROCHLORIDE 2.5 MG/ML
INJECTION, SOLUTION EPIDURAL; INFILTRATION; INTRACAUDAL PRN
Status: DISCONTINUED | OUTPATIENT
Start: 2024-02-08 | End: 2024-02-08 | Stop reason: HOSPADM

## 2024-02-08 RX ORDER — TRIAMCINOLONE ACETONIDE 40 MG/ML
INJECTION, SUSPENSION INTRA-ARTICULAR; INTRAMUSCULAR PRN
Status: DISCONTINUED | OUTPATIENT
Start: 2024-02-08 | End: 2024-02-08 | Stop reason: HOSPADM

## 2024-02-08 RX ORDER — DIAZEPAM 5 MG
5-10 TABLET ORAL
Status: DISCONTINUED | OUTPATIENT
Start: 2024-02-08 | End: 2024-02-09 | Stop reason: HOSPADM

## 2024-02-08 RX ORDER — HYDROMORPHONE HYDROCHLORIDE 2 MG/1
2 TABLET ORAL 2 TIMES DAILY PRN
Qty: 20 TABLET | Refills: 0 | Status: SHIPPED | OUTPATIENT
Start: 2024-02-08 | End: 2024-03-29

## 2024-02-08 NOTE — OP NOTE
Patient: Ada Welch Age: 53 year old   MRN: [id number] Attending: Dr. Martinez     Date of Visit: February 8, 2024      PAIN MEDICINE CLINIC PROCEDURE NOTE    ATTENDING CLINICIAN:    Faiza Martinez MD      PREPROCEDURE DIAGNOSES:  1.  Bilateral piriformis syndrome       PROCEDURE(S) PERFORMED:  1.  Bilateral piriformis muscle injection  2.  Fluoroscopic guidance for the above-namdromed procedure(s)      ANESTHESIA:  Local.    BLOOD LOSS:  Minimal.    DRAINS AND SPECIMENS:  None.    COMPLICATIONS:  None.    INDICATIONS:  Ada Welch is a 53 year old female with a history of  chronic low back pain secondary to piriformis syndrome.  Patient consented for the above-mentioned procedure.    Procedure Details:  The patient was met in the procedure room, where the patient was identified by name, medical record number and date of birth.  All of the patient s last minute questions were answered. Written informed consent was obtained and saved in the electronic medical record, after the risks, benefits, and alternatives were discussed with the patient.      A formal time-out procedure was performed, as per protocol, including patient name, title of procedure, and site of procedure, and all in the room concurred.  Routine monitors were applied.      The patient was placed in the prone position on the procedure room table.  All pressure points were checked and comfortably padded.  Routine monitors were placed.  Vital signs were stable.    A chlorhexidine prep was completed followed by sterile draping per standard procedure.     AP fluoroscopic guidance was used to identify the left SI joint(s). Target site is located 1.5 cm inferior, 1.5 cm lateral to the lower border of left SI joint. After 1% lidocaine infiltration using a 25 gauge 1.5 inch needle, a 22G 100 mm sonoplex needle was introduced with the nerve stimulator set at 1.0 mA at 2 Hz. The gluteus muscle twitching observed and the needle in intermittently advanced  until no twitching is seen. At this time,lateral view is taken to see needle position in relation to the sacrum. The needle was then intermittently advanced past sacrum 0.5-1 cm  until it  penetrates piriformis muscle, a change in resistance (more firm) observed. The patient didn't notice any feeling sharp pain shooting down their leg as the needle tip advanced. Then 1-2 ml of Isovue 220  contrast injected.  Contrast flow showed a diagonal pattern from cephalad to caudad as it goes toward the femoral attachment site of the piriformis muscle. Lateral view is also taken.     Then after negative aspiration for heme, 5 mL of a treatment mixture containing 1 mL of triamcinolone and 4 mL of bupivacaine 0.25% was injected.  The needle was then removed.  The same procedure was performed on the right side.    Light pressure was held at the puncture site(s) to prevent ecchymosis and oozing.  The patient's skin was cleansed, and hemostasis was confirmed.  Band-aids were applied to the needle injection site(s).      Condition:    The patient remained awake and alert throughout the procedure.  The patient tolerated the procedure well and was monitored for approximately 15 minutes afterward in the post procedure area.  There were no immediate post procedure complications noted.  The patient was then discharged to home as per protocol.      Pre-procedure pain score: 6/10  Post-procedure pain score: 3/10

## 2024-02-08 NOTE — DISCHARGE INSTRUCTIONS
PAIN INJECTION HOME CARE INSTRUCTIONS  Activity  -Rest today  -Do not work today  -Resume normal activity tomorrow  -DO NOT shower for 24 hours  -DO NOT remove bandaid for 24 hours    Pain  -You may experience soreness at the injection site for one or two days  -You may use an ice pack for 20 minutes every 2 hours for the first 24 hours  -You may use a heating pad after the first 24 hours  -You may use Tylenol (acetaminophen) every 4 hours or other pain medicines as directed by your physician    You may experience numbness radiating into your legs or arms (depending on the procedure location). This numbness may last several hours. Until sensation returns to normal; please use caution in walking, climbing stairs, and stepping out of your vehicle, etc.      DID YOU RECEIVE STEROIDS TODAY?  Yes    Common side effects of steroids:  Not everyone will experience corticosteroid side effects. If side effects are experienced, they will gradually subside in the 7-10 day period following an injection. Most common side effects include:  -Flushed face and/or chest  -Feeling of warmth, particularly in the face but could be an overall feeling of warmth  -Increased blood sugar in diabetic patients  -Menstrual irregularities my occur. If taking hormone-based birth control an alternate method of birth control is recommended  -Sleep disturbances and/or mood swings are possible  -Leg cramps    Please contact us if you have:  -Severe pain  -Fever more than 101.5 degrees Fahrenheit  -Signs of infection at the injection site (redness, swelling, or drainage)    FOR PAIN CENTER PATIENTS:  If you have questions, please contact the Pain Clinic at [phone] Option #1 between the hours of 7:00 am and 3:00 pm Monday through Friday. After office hours you can contact the on call provider by dialing [phone]. If you need immediate attention, we recommend that you go to a hospital emergency room or dial [phone].

## 2024-02-12 DIAGNOSIS — M96.1 LUMBAR POST-LAMINECTOMY SYNDROME: ICD-10-CM

## 2024-02-12 RX ORDER — DULOXETIN HYDROCHLORIDE 30 MG/1
CAPSULE, DELAYED RELEASE ORAL
Qty: 270 CAPSULE | Refills: 1 | Status: SHIPPED | OUTPATIENT
Start: 2024-02-12 | End: 2024-09-11

## 2024-02-12 NOTE — TELEPHONE ENCOUNTER
Refill request    Medication: DULoxetine (CYMBALTA) 30 MG capsule     Sig: TAKE 2 CAPSULES BY MOUTH IN THE AM AND 1 CAPSULES AT BEDTIME     Dispensed: 270  Refills: 0    Last prescribed to patient: 7/25/23    Last clinic appointment: 8/10/23  Next clinic appointment: 3/14/24      Preferred pharmacy:    [facility], [place] - [address] [place] [address]    Refill request routed to the provider to review.        Daisy Delarosa RNCC

## 2024-02-15 DIAGNOSIS — M96.1 POSTLAMINECTOMY SYNDROME: ICD-10-CM

## 2024-02-15 RX ORDER — CYCLOBENZAPRINE HCL 10 MG
10 TABLET ORAL 3 TIMES DAILY PRN
Qty: 60 TABLET | Refills: 0 | Status: SHIPPED | OUTPATIENT
Start: 2024-02-15 | End: 2024-03-15

## 2024-02-15 NOTE — CONFIDENTIAL NOTE
Refill request    Medication: cyclobenzaprine (FLEXERIL) 10 MG tablet     Sig: Take 1 tablet (10 mg) by mouth 3 times daily as needed for muscle spasms     Dispensed: 60 tablets  Refills: 0    Last prescribed to patient: 1/25/24    Last clinic appointment: 8/10/23  Next clinic appointment: 3/14/24    Last Drug Screen Collected: 5/25/23  Controlled Substance Agreement signed: 5/25/23      Preferred pharmacy:    [address]     Refill request routed to the provider to review.       Skye Jernigan RN

## 2024-02-17 ENCOUNTER — VIRTUAL VISIT (OUTPATIENT)
Dept: NEUROLOGY | Facility: CLINIC | Age: 54
End: 2024-02-17
Payer: COMMERCIAL

## 2024-02-17 DIAGNOSIS — G90.9 AUTONOMIC DYSFUNCTION: ICD-10-CM

## 2024-02-17 DIAGNOSIS — G43.719 INTRACTABLE CHRONIC MIGRAINE WITHOUT AURA AND WITHOUT STATUS MIGRAINOSUS: Primary | ICD-10-CM

## 2024-02-17 PROCEDURE — 99214 OFFICE O/P EST MOD 30 MIN: CPT | Mod: 95 | Performed by: PSYCHIATRY & NEUROLOGY

## 2024-02-17 RX ORDER — CEFUROXIME AXETIL 250 MG/1
TABLET ORAL
Qty: 2 KIT | Refills: 11 | Status: SHIPPED | OUTPATIENT
Start: 2024-02-17

## 2024-02-17 RX ORDER — RIZATRIPTAN BENZOATE 5 MG/1
TABLET, ORALLY DISINTEGRATING ORAL
Qty: 18 TABLET | Refills: 11 | Status: SHIPPED | OUTPATIENT
Start: 2024-02-17

## 2024-02-17 RX ORDER — PROPRANOLOL HYDROCHLORIDE 120 MG/1
120 CAPSULE, EXTENDED RELEASE ORAL DAILY
Qty: 90 CAPSULE | Refills: 3 | Status: SHIPPED | OUTPATIENT
Start: 2024-02-17

## 2024-02-17 RX ORDER — METOCLOPRAMIDE 10 MG/1
10 TABLET ORAL 4 TIMES DAILY PRN
Qty: 40 TABLET | Refills: 3 | Status: SHIPPED | OUTPATIENT
Start: 2024-02-17

## 2024-02-17 NOTE — LETTER
2/17/2024       RE: Ada Welch  [address] [address]     Dear Colleague,    Thank you for referring your patient, Ada Welch, to the [facility] NEUROLOGY CLINIC [place] at [facility]. Please see a copy of my visit note below.    Ada is a 53 year old who is being evaluated via a billable video visit.      How would you like to obtain your AVS? MyChart  If the video visit is dropped, the invitation should be resent by: Send to e-mail at: [email or web address]  Will anyone else be joining your video visit? No      Neurology Progress Note    [facility]    Ada Welch MRN# [id number]   Age: 53 year old YOB: 1970     PCP: Ellyn Rivera            Assessment and Plan:     (G43.719) Intractable chronic migraine without aura and without status migrainosus  (primary encounter diagnosis)  Comment: Currently experiencing migraine flares in the setting of also having chronic pain flares.  We need to get her back on track with Botox.  I will add her to my February 27 schedule for her next injection.    In the setting of having frequent migraine flares triptans are not ideal due to the risk of rebound headache.  We will see if her insurance will approve her for Nurtec as a better alternative for more frequent migraine rescue therapy.  This will require a prior authorization and I have reviewed new to the triptans while she waits.  Previously she has tried rizatriptan, sumatriptan and naratriptan.  She is also used butalbital and metoclopramide as rescue options as well as tramadol.        (G90.9) Autonomic dysfunction  Comment: The patient uses propranolol for both autonomic dysfunction and migraine.  I have renewed the prescription today.  Plan: propranolol ER (INDERAL LA) 120 MG 24 hr capsule         The patient has a chronic condition that causes significant disability that is currently exacerbated.  Prescription  management was changed to help get the chronic condition under better control and stabilized.            History of Present Illness:   CC: Salvatore Caballero is a 53-year-old woman with intractable chronic migraine headaches in the setting of a diagnosis of EDS, autonomic dysfunction and chronic pain.  We control her migraines with a combination of Botox injections plus propranolol.  She also uses duloxetine and medical cannabis for chronic pain management.  She is currently in a flare of her chronic pain and is using Dilaudid to calm that down.  For her migraines she has been using rizatriptan tablets that are dissolvable on the tongue or sumatriptan injectables for rescue therapy and if those do not work she will use metoclopramide.    She has 3 different types of headaches.  Currently she has some form of headaches 30 days a month.    1.Right sided headaches front to back, stabbing, sensitivities to light and sound. Right eye blurry and wavy. Headache can last for a few hours to a few days. 3-4 a month  2. Other headaches both side face go numb and tingling into both upper and lower lips, usually coming during a headache. It can linger longer. She can talk during it, also can eat but feels odd. Coming 1-2 times a week.  3. Pain coming up the back of the head, worst when she is upright (standing or sitting).    Last Botox 10/31/23.  She was scheduled for Botox with Dr. Murphy on January 25, 2024 but had to miss that due to her all over body pain flare.  She has not scheduled with me until mid March.    Overall Botox does help manage her symptoms.  We have tried other preventative treatments in the past without much benefit.  We do have to be careful because of her EDS do not over weak and neck musculature.  Botox when active has decreased migrainous flares by 50%.  It also decreases her use of rescue medicine.  In addition she notes that she is able to be more functional and interactive with her  family.      Again, thank you for allowing me to participate in the care of your patient.      Sincerely,    Noemy Nguyen MD

## 2024-02-17 NOTE — NURSING NOTE
Ada is a 53 year old who is being evaluated via a billable video visit.      How would you like to obtain your AVS? Widgetlabshart  If the video visit is dropped, the invitation should be resent by: Text to cell phone: [phone]  Will anyone else be joining your video visit? No        Video-Visit Details    Type of service:  Video Visit     Originating Location (pt. Location): Home    Distant Location (provider location):  Off-site  Platform used for Video Visit: [facility]    Chief Complaint   Patient presents with    SARAI Flores

## 2024-02-17 NOTE — PROGRESS NOTES
Ada is a 53 year old who is being evaluated via a billable video visit.      How would you like to obtain your AVS? MyChart  If the video visit is dropped, the invitation should be resent by: Send to e-mail at: [email or web address]  Will anyone else be joining your video visit? No        Video-Visit Details    Type of service:  Video Visit     Originating Location (pt. Location): Home    Distant Location (provider location):  Off-site  Platform used for Video Visit: [facility]    Neurology Progress Note    [facility]    Ada Welch MRN# [id number]   Age: 53 year old YOB: 1970     PCP: Ellyn Rivera            Assessment and Plan:     (G43.719) Intractable chronic migraine without aura and without status migrainosus  (primary encounter diagnosis)  Comment: Currently experiencing migraine flares in the setting of also having chronic pain flares.  We need to get her back on track with Botox.  I will add her to my February 27 schedule for her next injection.    In the setting of having frequent migraine flares triptans are not ideal due to the risk of rebound headache.  We will see if her insurance will approve her for Nurtec as a better alternative for more frequent migraine rescue therapy.  This will require a prior authorization and I have reviewed new to the triptans while she waits.  Previously she has tried rizatriptan, sumatriptan and naratriptan.  She is also used butalbital and metoclopramide as rescue options as well as tramadol.        (G90.9) Autonomic dysfunction  Comment: The patient uses propranolol for both autonomic dysfunction and migraine.  I have renewed the prescription today.  Plan: propranolol ER (INDERAL LA) 120 MG 24 hr capsule         The patient has a chronic condition that causes significant disability that is currently exacerbated.  Prescription management was changed to help get the chronic condition under better control and stabilized.    Noemy Nguyen MD                      History of Present Illness:   CC: Salvatore    Ada is a 53-year-old woman with intractable chronic migraine headaches in the setting of a diagnosis of EDS, autonomic dysfunction and chronic pain.  We control her migraines with a combination of Botox injections plus propranolol.  She also uses duloxetine and medical cannabis for chronic pain management.  She is currently in a flare of her chronic pain and is using Dilaudid to calm that down.  For her migraines she has been using rizatriptan tablets that are dissolvable on the tongue or sumatriptan injectables for rescue therapy and if those do not work she will use metoclopramide.    She has 3 different types of headaches.  Currently she has some form of headaches 30 days a month.    1.Right sided headaches front to back, stabbing, sensitivities to light and sound. Right eye blurry and wavy. Headache can last for a few hours to a few days. 3-4 a month  2. Other headaches both side face go numb and tingling into both upper and lower lips, usually coming during a headache. It can linger longer. She can talk during it, also can eat but feels odd. Coming 1-2 times a week.  3. Pain coming up the back of the head, worst when she is upright (standing or sitting).    Last Botox 10/31/23.  She was scheduled for Botox with Dr. Murphy on January 25, 2024 but had to miss that due to her all over body pain flare.  She has not scheduled with me until mid March.    Overall Botox does help manage her symptoms.  We have tried other preventative treatments in the past without much benefit.  We do have to be careful because of her EDS do not over weak and neck musculature.  Botox when active has decreased migrainous flares by 50%.  It also decreases her use of rescue medicine.  In addition she notes that she is able to be more functional and interactive with her family.

## 2024-02-21 NOTE — TELEPHONE ENCOUNTER
RECORDS RECEIVED FROM: Internal    REASON FOR VISIT: Discuss SCS   PROVIDER: Kirk Huber MD   DATE OF APPT: 3/19/24 @ 4:30 pm    NOTES (FOR ALL VISITS) STATUS DETAILS   OFFICE NOTE from referring provider Internal 2/19/24 (Phone Note)    OFFICE NOTE from other specialist Internal 2/17/24, 10[facility]31[facility]23 [facility]    10/24/23, 9/26/23 Kirk Burger, [facility] @[facility]-Primary Care    9/21/23 [facility]    8/30/23, 7/11/23 [facility]    8/10/23 Faiza Martinez MD @[facility]-Comprehensive Pain Mgmt    7/18/23 Kirk Russell MD @[facility]-Heart    6/20/23 [facility]    6/16/23 Shane Burks MD @[facility]-NeuroSurg    See Additional Encounters   DISCHARGE REPORT from the ER Internal 9/15/23 Oanh Roberts MD @[facility] ED     OPERATIVE REPORT Care Everywhere 12/9/13 Higinio Collazo MD  @[facility] L4-S1 OPEN TRANSFORAMINAL LUMBAR INTERBODY FUSION      EMG Internal 9/26/19 EMG     MEDICATION LIST Internal    IMAGING  (FOR ALL VISITS)     X-RAY Internal [facility]  10/26/23 XR Lumbar Spine  10/26/23 XR Sacroilliac Joint  10/26/23 XR Cervical Spine  6/16/23 XR Spine Complete  6/12/23 XR Lumbar Spine  2/17/23 XR Thoracici Spine  10/4/22 XR Spine Complete  5/31/22 XR Cervical Spine  8/5/21 XR Lumbar Bending     MRI (HEAD, NECK, SPINE) Internal [facility]  7/21/22 MR Lumbar Spine  7/9/22 MR Brain  7/9/22 MR Cervical Spine  6/29/20 MR Brain  6/29/20 MR Cervical Spine  11/2/19 MR Lumbar Spine  11/2/19 MR Thoracic Spine  11/2/19 MR Cervical Spine   CT (HEAD, NECK, SPINE) Internal [facility]  10/26/23 CTA Heaad Neck  9/15/23 CT Thoracic Spine  9/15/23 CT Lumbar Spine

## 2024-02-24 ENCOUNTER — HEALTH MAINTENANCE LETTER (OUTPATIENT)
Age: 54
End: 2024-02-24

## 2024-02-27 ENCOUNTER — OFFICE VISIT (OUTPATIENT)
Dept: NEUROLOGY | Facility: CLINIC | Age: 54
End: 2024-02-27
Payer: COMMERCIAL

## 2024-02-27 DIAGNOSIS — G43.719 INTRACTABLE CHRONIC MIGRAINE WITHOUT AURA AND WITHOUT STATUS MIGRAINOSUS: Primary | ICD-10-CM

## 2024-02-27 PROCEDURE — 64615 CHEMODENERV MUSC MIGRAINE: CPT | Performed by: PSYCHIATRY & NEUROLOGY

## 2024-02-27 NOTE — PROGRESS NOTES
Botulinum Toxin Procedure Note    Ada Welch  [id number]    Ordering provider: Noemy Nguyen MD    The procedure was explained to the patient. Benefits of the treatment were discussed including headache and migraine reduction. Risks of the procedure were reviewed including but not limited to pain, bruising, bleeding, infection, weakness of muscles injected or those distal to injection. The patient voiced understanding of the risks and benefits. All questions answered and the patient consented to proceed.     Prior to receiving Botox injections the patient reported the following:  Baseline headache/migraine frequency: more than 15 days a month  Baseline headache/migraine duration: more than 4 hours  Associated migrainous features: wavy vision distortion, nausea, photophobia    Previous treatment trials:  amitriptyline, zonisamide, gabapentin, Aimovig, Emgality, duloxetine, tizanidine, propranolol    Last Botox procedure: 10/31/2023  Current migraine frequency: late on this Botox so it has worn off. Typically more than 50% reduction in migraines.   Current migraine duration: 1 day    Functional improvements since starting botulinum toxin treatments: In addition she notes that she is able to be more functional and interactive with her family.     Last Neurology office visit: 2/17/2024    A 200 unit vial of Botox was diluted with 4ml of 0.9% sodium chloride    Botox lot # and expiration date: See MAR for details  0.9% sodium chloride lot # and expiration date: See MAR for details    The following muscles were injected using a 30 gauge needle:  Procerus 1 site 5 units   2 sites (bilat) 10 units  Frontalis 4 sites (bilat) 20 units  Temporalis 8 sites (bilat) 40 units  **Trapezius muscles 4 sites (bilat) 10 units (lateral and reduced)  Occipitalis 8 sites (bilat) 40 units  **Splenius capitus 2 sites (bilat) 10 units (lateral and reduced)  Masseter 2 sites (bilat) 20 units    A total of 155 units were  injected, 45 units wasted.   The patient tolerated the injections well. I was present for and performed the entire procedure.     Noemy Nguyen MD

## 2024-02-27 NOTE — LETTER
2/27/2024       RE: Ada Welch  [address] [address]     Dear Colleague,    Thank you for referring your patient, Ada Welch, to the [facility] NEUROLOGY CLINIC [facility]. Please see a copy of my visit note below.        Botulinum Toxin Procedure Note    Ada Welch  [id number]    Ordering provider: Noemy Nguyen MD    The procedure was explained to the patient. Benefits of the treatment were discussed including headache and migraine reduction. Risks of the procedure were reviewed including but not limited to pain, bruising, bleeding, infection, weakness of muscles injected or those distal to injection. The patient voiced understanding of the risks and benefits. All questions answered and the patient consented to proceed.     Prior to receiving Botox injections the patient reported the following:  Baseline headache/migraine frequency: more than 15 days a month  Baseline headache/migraine duration: more than 4 hours  Associated migrainous features: wavy vision distortion, nausea, photophobia    Previous treatment trials:  amitriptyline, zonisamide, gabapentin, Aimovig, Emgality, duloxetine, tizanidine, propranolol    Last Botox procedure: 10/31/2023  Current migraine frequency: late on this Botox so it has worn off. Typically more than 50% reduction in migraines.   Current migraine duration: 1 day    Functional improvements since starting botulinum toxin treatments: In addition she notes that she is able to be more functional and interactive with her family.     Last Neurology office visit: 2/17/2024    A 200 unit vial of Botox was diluted with 4ml of 0.9% sodium chloride    Botox lot # and expiration date: See MAR for details  0.9% sodium chloride lot # and expiration date: See MAR for details    The following muscles were injected using a 30 gauge needle:  Procerus 1 site 5 units   2 sites (bilat) 10  units  Frontalis 4 sites (bilat) 20 units  Temporalis 8 sites (bilat) 40 units  **Trapezius muscles 4 sites (bilat) 10 units (lateral and reduced)  Occipitalis 8 sites (bilat) 40 units  **Splenius capitus 2 sites (bilat) 10 units (lateral and reduced)  Masseter 2 sites (bilat) 20 units    A total of 155 units were injected, 45 units wasted.   The patient tolerated the injections well. I was present for and performed the entire procedure.       Again, thank you for allowing me to participate in the care of your patient.      Sincerely,    Noemy Nguyen MD

## 2024-02-29 ENCOUNTER — MYC MEDICAL ADVICE (OUTPATIENT)
Dept: CARDIOLOGY | Facility: CLINIC | Age: 54
End: 2024-02-29
Payer: COMMERCIAL

## 2024-03-05 ENCOUNTER — THERAPY VISIT (OUTPATIENT)
Dept: PHYSICAL THERAPY | Facility: CLINIC | Age: 54
End: 2024-03-05
Payer: COMMERCIAL

## 2024-03-05 DIAGNOSIS — M62.81 GENERALIZED MUSCLE WEAKNESS: ICD-10-CM

## 2024-03-05 DIAGNOSIS — Q79.60 EDS (EHLERS-DANLOS SYNDROME): Primary | ICD-10-CM

## 2024-03-05 PROCEDURE — 97110 THERAPEUTIC EXERCISES: CPT | Mod: GP | Performed by: PHYSICAL THERAPIST

## 2024-03-05 PROCEDURE — 97140 MANUAL THERAPY 1/> REGIONS: CPT | Mod: GP | Performed by: PHYSICAL THERAPIST

## 2024-03-11 ENCOUNTER — TELEPHONE (OUTPATIENT)
Dept: INTERNAL MEDICINE | Facility: CLINIC | Age: 54
End: 2024-03-11
Payer: COMMERCIAL

## 2024-03-11 NOTE — TELEPHONE ENCOUNTER
PA Initiation    Medication: WEGOVY 2.4 MG/0.75ML SC SOAJ  [facility] Phone [phone] Fax [phone]  Pharmacy Filling the Rx: [place], [place] - [address] [place] [address]  Filling Pharmacy Phone: [phone]  Filling Pharmacy Fax:    Start Date: 3/11/2024

## 2024-03-11 NOTE — TELEPHONE ENCOUNTER
PRIOR AUTHORIZATION DENIED    Medication: WEGOVY 2.4 MG/0.75ML SC SOAJ  Insurance Company: [employer] - Phone [phone] Fax [phone]  Denial Date: 3/11/2024  Denial Reason(s): Weight Loss Medications are Excluded      Appeal Information: N/A  Patient Notified: No

## 2024-03-12 ENCOUNTER — ANCILLARY PROCEDURE (OUTPATIENT)
Dept: MAMMOGRAPHY | Facility: CLINIC | Age: 54
End: 2024-03-12
Attending: INTERNAL MEDICINE
Payer: COMMERCIAL

## 2024-03-12 ENCOUNTER — THERAPY VISIT (OUTPATIENT)
Dept: PHYSICAL THERAPY | Facility: CLINIC | Age: 54
End: 2024-03-12
Payer: COMMERCIAL

## 2024-03-12 DIAGNOSIS — Z12.31 VISIT FOR SCREENING MAMMOGRAM: ICD-10-CM

## 2024-03-12 DIAGNOSIS — M62.81 GENERALIZED MUSCLE WEAKNESS: ICD-10-CM

## 2024-03-12 DIAGNOSIS — Q79.60 EDS (EHLERS-DANLOS SYNDROME): Primary | ICD-10-CM

## 2024-03-12 PROCEDURE — 77067 SCR MAMMO BI INCL CAD: CPT | Mod: TC | Performed by: STUDENT IN AN ORGANIZED HEALTH CARE EDUCATION/TRAINING PROGRAM

## 2024-03-12 PROCEDURE — 77063 BREAST TOMOSYNTHESIS BI: CPT | Mod: TC | Performed by: STUDENT IN AN ORGANIZED HEALTH CARE EDUCATION/TRAINING PROGRAM

## 2024-03-12 PROCEDURE — 97110 THERAPEUTIC EXERCISES: CPT | Mod: GP | Performed by: PHYSICAL THERAPIST

## 2024-03-12 NOTE — PROGRESS NOTES
03/12/24 0500   Appointment Info   Signing clinician's name / credentials JOCELINE Acevedo DPT   Visits Used 4   Medical Diagnosis EDS   PT Tx Diagnosis impaired posture, muscle tightness and pain   Progress Note/Certification   Start of Care Date 12/12/23   Onset of illness/injury or Date of Surgery 11/30/23   Therapy Frequency 1x/week   Predicted Duration 90 days   Certification date from 03/11/24   Certification date to 05/09/24   PT Goal 1   Goal Identifier gait speed   Goal Description Pt will increase gait speed by 0.12 m/s in order to increase ease of community mobility   Goal Progress eval: 0.58 m/s. Not tested today due to increased pain with gait   Target Date 05/09/24   PT Goal 2   Goal Identifier Deep neck flexor endurance   Goal Description Pt will increase deep neck flexor endurance by 10 seconds in order to decrease pain and improve posture   Goal Progress eval: 10 sec   Target Date 05/09/24   PT Goal 3   Goal Identifier HEP   Goal Description Pt will be independent with HEP in order to imrpove self management of symptoms   Goal Progress in progress, modified today due to flair.   Target Date 05/09/24   Subjective Report   Subjective Report Reports that this last week had been bad. The tape assisted with upper back pain but then seemed to increase LBP. Wondering about doing just the pain relief.- Hips feel flared out.   Therapeutic Procedure/Exercise   Therapeutic Procedures: strength, endurance, ROM, flexibility minutes (00356) 40   Ther Proc 1 - Details Supine TA set with breath, x 30 sec but then increase pain. Gentle MET via shotgun technique for SIJ, 3 x 5 sec holds. Sidelying clams with PT providing stabilizing pressure on sacrum, x 5 reps/side and also gentle sidelying hip extension isometrics 2 x 5 reps.  Taping to B UT for relaxation and lumbar spine in star pattern. Education on SI belt for stability. Also used gait belt for slipping ribs- deep breathing for 30 sec with gait belt sinched  tightly over area of increased pain.   Plan   Plan for next session check in on pain flair, review HEP, retest gait speed, cont taping and supportive cares   Total Session Time   Timed Code Treatment Minutes 40   Total Treatment Time (sum of timed and untimed services) 40         [facility]                                                                                   OUTPATIENT PHYSICAL THERAPY    PLAN OF TREATMENT FOR OUTPATIENT REHABILITATION   Patient's Last Name, First Name, Ada Nash YOB: 1970   Provider's Name   [facility]   Medical Record No.  [id number]     Onset Date: 11/30/23  Start of Care Date: 12/12/23     Medical Diagnosis:  EDS      PT Treatment Diagnosis:  impaired posture, muscle tightness and pain Plan of Treatment  Frequency/Duration: 1x/week/ 90 days    Certification date from 03/11/24 to 05/09/24         See note for plan of treatment details and functional goals     Anamaria Acevedo, PT                         I CERTIFY THE NEED FOR THESE SERVICES FURNISHED UNDER        THIS PLAN OF TREATMENT AND WHILE UNDER MY CARE     (Physician attestation of this document indicates review and certification of the therapy plan).              Referring Provider:  Ellyn Rivera    Initial Assessment  See Epic Evaluation- Start of Care Date: 12/12/23

## 2024-03-13 DIAGNOSIS — E78.2 MIXED HYPERLIPIDEMIA: ICD-10-CM

## 2024-03-13 DIAGNOSIS — F41.9 ANXIETY: ICD-10-CM

## 2024-03-13 DIAGNOSIS — M96.1 POSTLAMINECTOMY SYNDROME: ICD-10-CM

## 2024-03-14 ENCOUNTER — OFFICE VISIT (OUTPATIENT)
Dept: ANESTHESIOLOGY | Facility: CLINIC | Age: 54
End: 2024-03-14
Payer: COMMERCIAL

## 2024-03-14 VITALS
OXYGEN SATURATION: 99 % | WEIGHT: 179 LBS | SYSTOLIC BLOOD PRESSURE: 117 MMHG | BODY MASS INDEX: 28.09 KG/M2 | DIASTOLIC BLOOD PRESSURE: 81 MMHG | HEIGHT: 67 IN | HEART RATE: 76 BPM

## 2024-03-14 DIAGNOSIS — G89.29 CHRONIC SACROILIAC JOINT PAIN: ICD-10-CM

## 2024-03-14 DIAGNOSIS — M96.1 POSTLAMINECTOMY SYNDROME, LUMBAR REGION: Primary | ICD-10-CM

## 2024-03-14 DIAGNOSIS — Z98.1 S/P LUMBAR FUSION: ICD-10-CM

## 2024-03-14 DIAGNOSIS — M43.16 SPONDYLOLISTHESIS OF LUMBAR REGION: ICD-10-CM

## 2024-03-14 DIAGNOSIS — F11.20 OPIOID TYPE DEPENDENCE, CONTINUOUS (H): ICD-10-CM

## 2024-03-14 DIAGNOSIS — M96.1 POSTLAMINECTOMY SYNDROME: ICD-10-CM

## 2024-03-14 DIAGNOSIS — M54.16 LUMBAR RADICULOPATHY, CHRONIC: ICD-10-CM

## 2024-03-14 DIAGNOSIS — M53.3 CHRONIC SACROILIAC JOINT PAIN: ICD-10-CM

## 2024-03-14 DIAGNOSIS — M47.812 FACET ARTHROPATHY, CERVICAL: ICD-10-CM

## 2024-03-14 DIAGNOSIS — G90.A POTS (POSTURAL ORTHOSTATIC TACHYCARDIA SYNDROME): ICD-10-CM

## 2024-03-14 DIAGNOSIS — G57.03 PIRIFORMIS SYNDROME OF BOTH SIDES: ICD-10-CM

## 2024-03-14 PROCEDURE — 99214 OFFICE O/P EST MOD 30 MIN: CPT | Performed by: ANESTHESIOLOGY

## 2024-03-14 RX ORDER — TRAMADOL HYDROCHLORIDE 50 MG/1
50 TABLET ORAL EVERY 6 HOURS PRN
Qty: 65 TABLET | Refills: 3 | Status: SHIPPED | OUTPATIENT
Start: 2026-03-26 | End: 2024-06-13

## 2024-03-14 ASSESSMENT — PAIN SCALES - PAIN ENJOYMENT GENERAL ACTIVITY SCALE (PEG)
INTERFERED_GENERAL_ACTIVITY: 8
PEG_TOTALSCORE: 7
AVG_PAIN_PASTWEEK: 8
INTERFERED_ENJOYMENT_LIFE: 5

## 2024-03-14 ASSESSMENT — PAIN SCALES - GENERAL: PAINLEVEL: SEVERE PAIN (6)
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
## 2024-03-14 NOTE — PATIENT INSTRUCTIONS
Medications:    Tramadol refilled.    *For refills of opioid medications - You MUST call (Or MyChart) the clinic DIRECTLY and at least 7 days before you run out of your medication.      Referrals:     Acupuncture Referral.  -Please call your insurance provider to find out about acupuncture coverage, being that not all policies cover acupuncture services.       Treatment planning:    We will follow up with our colleagues to explore PRP injections.      Recommended Follow up:      Follow up as needed.        To speak with a nurse, schedule/reschedule/cancel a clinic appointment, or request a medication refill call: [phone]    You can also reach us by [facility]: [email or web address]

## 2024-03-14 NOTE — NURSING NOTE
Patient presents with:  Follow Up: Follow-up Neck- back- Hips- Pain      Severe Pain (6)     Pain Medications       Analgesics Other Refills Start End     acetaminophen (TYLENOL) 500 MG tablet --  --    Sig - Route: Take 1,000 mg by mouth every 6 hours as needed for mild pain - Oral    Class: Historical       Analgesic Combinations Refills Start End     butalbital-acetaminophen-caffeine (ESGIC) -40 MG tablet 0 11/16/2023 --    Sig - Route: Take 1 tablet by mouth every 6 hours as needed for headaches - Oral    Class: E-Prescribe       Opioid Agonists Refills Start End     HYDROmorphone (DILAUDID) 2 MG tablet 0 2/8/2024 --    Sig - Route: Take 1 tablet (2 mg) by mouth 2 times daily as needed for pain - Oral    Class: E-Prescribe    Earliest Fill Date: 2/8/2024     traMADol (ULTRAM) 50 MG tablet 3 11/30/2023 --    Sig - Route: Take 1 tablet (50 mg) by mouth every 6 hours as needed for severe pain - Oral    Class: E-Prescribe    No prior authorization was found for this prescription.    Found prior authorization for another prescription for the same medication: Closed - Other       Opioid Partial Agonists Refills Start End     buprenorphine (BUTRANS) 10 MCG/HR WK patch 1 1/17/2024 --    Sig - Route: Place 1 patch onto the skin once a week - Transdermal    Class: E-Prescribe    No prior authorization was found for this prescription.    Found prior authorization for another prescription for the same medication: Approved            What medications are you using for pain? Butrans patches, Tramadol,Tylenol, Hydromorphone, Flexeril    (New patients only) Have you been seen by another pain clinic/ provider? no    (Return Patients only) What refills are you needing today? Hydromorphone, Tramadol, Flexeril

## 2024-03-15 RX ORDER — CYCLOBENZAPRINE HCL 10 MG
10 TABLET ORAL 3 TIMES DAILY PRN
Qty: 60 TABLET | Refills: 0 | Status: SHIPPED | OUTPATIENT
Start: 2024-03-15 | End: 2024-04-09

## 2024-03-15 RX ORDER — TRAMADOL HYDROCHLORIDE 50 MG/1
50 TABLET ORAL EVERY 6 HOURS PRN
Qty: 65 TABLET | Refills: 3 | OUTPATIENT
Start: 2024-03-15

## 2024-03-15 NOTE — TELEPHONE ENCOUNTER
Refill request    Medication: cyclobenzaprine (FLEXERIL) 10 MG tablet     Sig: TAKE ONE TABLET BY MOUTH THREE TIMES A DAY AS NEEDED FOR MUSCLE SPASMS     Dispensed: 60  Refills: 0    Last prescribed to patient: 2/15/24 (Non controlled substance)    Last clinic appointment: 3/14/24  Next clinic appointment: Not scheduled.       Preferred pharmacy:    [facility] [address] [place] [address] [phone]     Refill request routed to the provider to review.     Mya Cash LPN

## 2024-03-19 ENCOUNTER — PRE VISIT (OUTPATIENT)
Dept: NEUROSURGERY | Facility: CLINIC | Age: 54
End: 2024-03-19

## 2024-03-19 NOTE — TELEPHONE ENCOUNTER
RECORDS RECEIVED FROM: Care Everywhere   REASON FOR VISIT: Discuss SCS   PROVIDER: Kirk Huber MD   DATE OF APPT: 4/30/24 @ 1:00 pm (rescheduled from 3/19/24)   NOTES (FOR ALL VISITS) STATUS DETAILS   OFFICE NOTE from referring provider Internal 2/19/24 (Phone Note)    OFFICE NOTE from other specialist Care Everywhere 2/17/24, 10/31/23 Noemy Nguyen MD @[facility]-Neuro     10/24/23, 9/26/23 Kirk Burger, [facility] @[facility]Primary Care     9/21/23 [facility]     8/30/23, 7/11/23 Ellyn Rivera MD [facility]     3/14/24, 8/10/23 Faiza Martinez MD @[facility]-Comprehensive Pain Mgmt     7/18/23 Kirk Russell MD @[facility]-Heart     6/20/23 [facility]     6/16/23 Shane Burks MD @[facility]NeuroSurg     See Additional Encounters   DISCHARGE REPORT from the ER Internal 9/15/23 Oanh Roberts MD @[facility] ED     OPERATIVE REPORT Care Everywhere 12/9/13 Higinio Collazo MD  @[facility] L4-S1 OPEN TRANSFORAMINAL LUMBAR INTERBODY FUSION     EMG Internal 9/26/19 EMG   MEDICATION LIST Internal    IMAGING  (FOR ALL VISITS)     X-RAY Internal [facility]  10/26/23 XR Lumbar Spine  10/26/23 XR Sacroilliac Joint  10/26/23 XR Cervical Spine  6/16/23 XR Spine Complete  6/12/23 XR Lumbar Spine  2/17/23 XR Thoracici Spine  10/4/22 XR Spine Complete  5/31/22 XR Cervical Spine  8/5/21 XR Lumbar Bending     MRI (HEAD, NECK, SPINE) Internal [facility]  7/21/22 MR Lumbar Spine  7/9/22 MR Brain  7/9/22 MR Cervical Spine  6/29/20 MR Brain  6/29/20 MR Cervical Spine  11/2/19 MR Lumbar Spine  11/2/19 MR Thoracic Spine  11/2/19 MR Cervical Spine     CT (HEAD, NECK, SPINE) Internal [facility]  10/26/23 CTA [place] Neck  9/15/23 CT Thoracic Spine  9/15/23 CT Lumbar Spine

## 2024-03-20 RX ORDER — ATORVASTATIN CALCIUM 20 MG/1
20 TABLET, FILM COATED ORAL DAILY
Qty: 90 TABLET | Refills: 0 | Status: SHIPPED | OUTPATIENT
Start: 2024-03-20 | End: 2024-06-26

## 2024-03-20 RX ORDER — HYDROXYZINE HYDROCHLORIDE 25 MG/1
25 TABLET, FILM COATED ORAL EVERY 6 HOURS PRN
Qty: 180 TABLET | Refills: 1 | Status: SHIPPED | OUTPATIENT
Start: 2024-03-20 | End: 2024-07-08

## 2024-03-21 ENCOUNTER — VIRTUAL VISIT (OUTPATIENT)
Dept: PHARMACY | Facility: CLINIC | Age: 54
End: 2024-03-21
Payer: COMMERCIAL

## 2024-03-21 DIAGNOSIS — R52 PAIN: ICD-10-CM

## 2024-03-21 DIAGNOSIS — R73.03 PREDIABETES: ICD-10-CM

## 2024-03-21 DIAGNOSIS — J30.2 SEASONAL ALLERGIC RHINITIS: ICD-10-CM

## 2024-03-21 DIAGNOSIS — J30.2 SEASONAL ALLERGIC RHINITIS, UNSPECIFIED TRIGGER: ICD-10-CM

## 2024-03-21 DIAGNOSIS — R61 EXCESSIVE SWEATING: ICD-10-CM

## 2024-03-21 PROCEDURE — 99207 PR NO CHARGE LOS: CPT | Mod: 93 | Performed by: PHARMACIST

## 2024-03-21 RX ORDER — TOPIRAMATE 25 MG/1
TABLET, FILM COATED ORAL
Qty: 90 TABLET | Refills: 3 | Status: SHIPPED | OUTPATIENT
Start: 2024-03-21 | End: 2024-05-03

## 2024-03-21 NOTE — LETTER
_  Medication List        Prepared on: 03/25/2024     Bring your Medication List when you go to the doctor, hospital, or   emergency room. And, share it with your family or caregivers.     Note any changes to how you take your medications.  Cross out medications when you no longer use them.    Medication How I take it Why I use it Prescriber   acetaminophen (TYLENOL) 500 MG tablet Take 1,000 mg by mouth every 6 hours as needed for mild pain  Pain Patient Reported   atorvastatin (LIPITOR) 20 MG tablet TAKE ONE TABLET BY MOUTH ONCE DAILY Mixed Hyperlipidemia Ellyn Rivera MD   buprenorphine (BUTRANS) 10 MCG/HR WK patch Place 1 patch onto the skin once a week Postlaminectomy syndrome Faiza Martinez MD   butalbital-acetaminophen-caffeine (ESGIC) -40 MG tablet Take 1 tablet by mouth every 6 hours as needed for headaches Intractable chronic migraine without aura and without status migrainosus Noemy Nguyen MD   celecoxib (CELEBREX) 100 MG capsule Take 1 capsule (100 mg) by mouth 2 times daily as needed for moderate pain (with food) Postlaminectomy Syndrome, Lumbar Region Ellyn Rivera MD   cetirizine (ZYRTEC) 10 MG tablet Take 10 mg by mouth daily as needed   allergies Patient Reported   co-enzyme Q-10 100 MG CAPS capsule Take 100 mg by mouth daily  supplement Patient Reported   cyclobenzaprine (FLEXERIL) 10 MG tablet TAKE ONE TABLET BY MOUTH THREE TIMES A DAY AS NEEDED FOR MUSCLE SPASMS Postlaminectomy syndrome Faiza Martinez MD   DULoxetine (CYMBALTA) 30 MG capsule TAKE 2 CAPSULES BY MOUTH IN THE MORNING AND 1 CAPSULE BY MOUTH AT BEDTIME Lumbar Post-Laminectomy Syndrome Faiza Martinez MD   EPINEPHrine (ANY BX GENERIC EQUIV) 0.3 MG/0.3ML injection 2-pack Inject 0.3 mLs (0.3 mg) into the muscle as needed for anaphylaxis May repeat one time in 5-15 minutes if response to initial dose is inadequate. Allergic reaction, sequela Ellyn Rivera MD   ferrous fumarate 65 mg, [place]. FE,-Vitamin C 125 mg (VITRON-C)   MG TABS tablet Take 1 tablet by mouth every other day  Supplement Ellyn Rivera MD   fluticasone (FLONASE) 50 MCG/ACT nasal spray Spray 2 sprays into both nostrils daily Call clinic to schedule follow up appointment. Chronic Rhinitis Ellyn Rivera MD   guanFACINE (TENEX) 1 MG tablet TAKE ONE TAB BY MOUTH ONCE NIGHTLY AT BEDTIME  hyperhydrosis Patient Reported   HYDROmorphone (DILAUDID) 2 MG tablet Take 1 tablet (2 mg) by mouth 2 times daily as needed for pain Failed back surgical syndrome Faiza Martinez MD   hydrOXYzine HCl (ATARAX) 25 MG tablet TAKE 1 TABLET (25 MG) BY MOUTH EVERY 6 HOURS AS NEEDED FOR ANXIETY Anxiety Ellyn Rivera MD   ibuprofen ([employer] IBUPROFEN) 200 MG capsule Take 600 mg by mouth every 4 hours as needed for fever  pain Patient Reported   ivabradine (CORLANOR) 7.5 MG tablet Take 1 tablet (7.5 mg) by mouth 2 times daily Autonomic dysfunction Kirk Russell MD   Lidocaine (LIDOCARE) 4 % Patch Place 1-3 patches onto the skin every 24 hours To prevent lidocaine toxicity, patient should be patch free for 12 hrs daily. Gastroesophageal reflux disease with esophagitis Alana RAMIREZ MD   methylPREDNISolone (MEDROL DOSEPAK) 4 MG tablet therapy pack Follow Package Directions Intractable chronic migraine without aura and without status migrainosus Noemy Nguyen MD   metoclopramide (REGLAN) 10 MG tablet Take 1 tablet (10 mg) by mouth 4 times daily as needed (headache) Intractable chronic migraine without aura and without status migrainosus Noemy Nguyen MD   Multiple Vitamins-Minerals (MULTIVITAMIN PO) Take 1 tablet by mouth daily   supplement Patient Reported   naloxone (NARCAN) 4 MG/0.1ML nasal spray Spray 1 spray (4 mg) into one nostril alternating nostrils once as needed for opioid reversal every 2-3 minutes until assistance arrives Postlaminectomy Syndrome of Lumbar Region Faiza Martinez MD   Probiotic Product (PROBIOTIC DAILY PO) Take 6 packets by mouth every  morning Five-Lac  supplement Patient Reported   propranolol ER (INDERAL LA) 120 MG 24 hr capsule Take 1 capsule (120 mg) by mouth daily Autonomic dysfunction Noemy Nguyen MD   ramelteon (ROZEREM) 8 MG tablet Take 1 tablet (8 mg) by mouth At Bedtime Insomnia, unspecified type Ellyn Rivera MD   rimegepant (NURTEC) 75 MG ODT tablet Place 1 tablet (75 mg) under the tongue daily as needed for migraine Maximum of 1 tablet every 24 hours. Intractable chronic migraine without aura and without status migrainosus Noemy Nguyen MD   rizatriptan (MAXALT-MLT) 5 MG ODT TAKE 1-2 TABLETS (5-10 MG) BY MOUTH AT ONSET OF HEADACHE FOR MIGRAINE (MAX 30 MG IN 24 HOURS) Intractable chronic migraine without aura and without status migrainosus Noemy Nguyen MD   SUMAtriptan (IMITREX STATDOSE) 6 MG/0.5ML pen injector kit Inject 0.5 ml (6 mg) subcutaneously at onset of migraine, May repeat in 1 hour , Max 12 mg/24 hrs Intractable chronic migraine without aura and without status migrainosus Noemy Nguyen MD   topiramate (TOPAMAX) 25 MG tablet Take 25 mg once daily at night for 1 week, then increase to 50 mg nightly for 1 week, then increase to 75 mg nightly thereafter. Decrease dose if experiencing side effects. BMI 31.0-31.9,Adult Ellyn Rivera MD   traMADol (ULTRAM) 50 MG tablet [START ON 3/26/2026] Take 1 tablet (50 mg) by mouth every 6 hours as needed for severe pain Postlaminectomy syndrome Faiza Martinez MD   vitamin B complex with vitamin C (STRESS TAB) tablet Take 1 tablet by mouth daily  supplement Patient Reported   VITAMIN D, CHOLECALCIFEROL, PO Take 2,000 Units by mouth daily  supplement Entered By History Unknown   zolpidem (AMBIEN) 5 MG tablet Take 1 tablet (5 mg) by mouth nightly as needed for sleep (avoid daily use, do not take with opioids) Call for refills Insomnia, unspecified type Ellyn Rivera MD         Add new medications, over-the-counter drugs, herbals, vitamins,  or  minerals in the blank rows below.    Medication How I take it Why I use it Prescriber                                      Allergies:      baclofen; bee venom; chicken-derived products (egg); compazine [prochlorperazine]; food; gabapentin; gluten meal; pregabalin; seasonal allergies        Side effects I have had:               Other Information:              My notes and questions:

## 2024-03-21 NOTE — LETTER
March 25, 2024  Ada CASTANON Rebekah  [address] [facility] [address]    Dear Ms. Welch, [facility]     Thank you for talking with me on Mar 21, 2024 about your health and medications. As a follow-up to our conversation, I have included two documents:      Your Recommended To-Do List has steps you should take to get the best results from your medications.  Your Medication List will help you keep track of your medications and how to take them.    If you want to talk about these documents, please call Kirk Burger RPH at phone: [phone], Monday-Friday 8-4:30pm.    I look forward to working with you and your doctors to make sure your medications work well for you.    Sincerely,  Kirk Burger RPH  [facility] Pharmacist, [facility]

## 2024-03-21 NOTE — LETTER
"Recommended To-Do List      Prepared on: 03/25/2024       You can get the best results from your medications by completing the items on this \"To-Do List.\"      Bring your To-Do List when you go to your doctor. And, share it with your family or caregivers.    My To-Do List:  What we talked about: What I should do:   A new medication that may be of benefit to you    Discuss phentermine with your provider          What we talked about: What I should do:   The cost of your medication(s)    Change the medication you are taking from Semaglutide-Weight Management (WEGOVY) to TOPIRAMATE PO          What we talked about: What I should do:                       "

## 2024-03-21 NOTE — PROGRESS NOTES
Medication Therapy Management (MTM) Encounter    ASSESSMENT:                            Medication Adherence/Access: No issues identified    Weight Loss/Prediabetes:   Patient would benefit from initiating an additional medication for weight loss such as topiramate and phentermine. She should monitor blood pressure on phentermine. Alternatives for weight loss include bupropion/naltrexone.    Pain:   Patient has been educated on risk of using dual NSAIDs for pain management.    Hyperhidrosis:   Patient could benefit from oral glycopyrrolate or oxybutynin for better management of symptoms. These medications have a risk for symptoms of dry mouth. Aluminum chloride topical cream could be beneficial to avoid systemic side effects.    Allergies:   Stable    PLAN:                            Start topiramate 25 mg daily for 1 week then increased to topiramate 50 mg daily for 1 week then increase to topiramate 75 mg daily  I will recommend phentermine to your primary care provider. If your provider agrees, monitor your blood pressure  I will send a [employer] message with information on hyperhidrosis.    Follow-up: Return in about 5 weeks (around 4/25/2024) for Follow up, with me.    SUBJECTIVE/OBJECTIVE:                          Ada Welch is a 53 year old female called for a follow-up visit from 11/21.       Reason for visit: CMR and wegovy follow-up.    Allergies/ADRs: Patient does not have a topiramate allergy  Past Medical History: Reviewed in chart  Tobacco: She reports that she quit smoking about 10 years ago. Her smoking use included cigarettes. She started smoking about 33 years ago. She has a 2 pack-year smoking history. She has never used smokeless tobacco.  Alcohol: none previous - did not assess    Medication Adherence/Access: no issues reported     Weight Loss/Prediabetes:   No medication    Patient reports she stopped taking Wegovy because insurance coverage was changed at the beginning of the year. Patient  had to stop medication end of January and has gained 15 pounds (currently 185 pounds). She has been experiencing non-stop hunger, even when full and wants to vomit. Patient's goal weight is 140 pounds.    Pain:   Acetaminophen 1000 mg four times daily as needed - 1000 mg daily  Buprenorphine 10 mcg/hr patch weekly   Celecoxib 100 mg twice daily  Cyclobenzaprine 10 mg three times daily   Duloxetine 60 mg in the morning and 30 mg at night  Hydromorphone 2 mg twice daily   Hydroxyzine 25 mg four times daily as needed  Ibuprofen 600 mg six times daily as needed - 6 times a month  Lidocaine 4% patch daily  Medical cannabis  Tramadol 50 mg four times daily as needed    Patient reports that cyclobenzaprine has been more effective than tizanidine.     Hyperhidrosis:  Guanfacine 1 mg daily    Patient feels that medication is no longer as effective as it was initially. Patient reports getting dry mouth but still experiences profuse sweating.    Allergies:   Cetirizine 10 mg daily - several times a month  Fluticasone 50 mcg/act two sprays (both nostrils)  Epipen    No questions or concerns.    Today's Vitals: LMP  (LMP Unknown)   ----------------    I spent 30 minutes with this patient today. All changes were made via collaborative practice agreement with Ellyn Rivera MD. A copy of the visit note was provided to the patient's provider(s).    A summary of these recommendations was sent via [employer].    [facility] Student Pharmacist (4th Year)    Preceptor Cosignature: I have reviewed and verified the student's documentation.    Kirk Burger, Pharm. D., BCACP  Medication Therapy Management Pharmacist  Direct Voicemail: [phone]      Telemedicine Visit Details  Type of service:  Telephone visit  Start Time:  10:30 AM  End Time:  11:00 AM     Medication Therapy Recommendations  BMI 31.0-31.9,adult    Rationale: Untreated condition - Needs additional medication therapy - Indication   Recommendation: Start  Medication - topiramate 25 MG tablet   Status: Accepted per CPA          Rationale: Untreated condition - Needs additional medication therapy - Indication   Recommendation: Start Medication - phentermine 15 MG capsule   Status: Contact Provider - Awaiting Response

## 2024-03-22 ENCOUNTER — MYC MEDICAL ADVICE (OUTPATIENT)
Dept: PHARMACY | Facility: CLINIC | Age: 54
End: 2024-03-22
Payer: COMMERCIAL

## 2024-03-29 ENCOUNTER — THERAPY VISIT (OUTPATIENT)
Dept: PHYSICAL THERAPY | Facility: CLINIC | Age: 54
End: 2024-03-29
Payer: COMMERCIAL

## 2024-03-29 ENCOUNTER — TELEPHONE (OUTPATIENT)
Dept: ANESTHESIOLOGY | Facility: CLINIC | Age: 54
End: 2024-03-29

## 2024-03-29 DIAGNOSIS — Q79.60 EHLERS-DANLOS SYNDROME: Primary | ICD-10-CM

## 2024-03-29 DIAGNOSIS — M62.81 GENERALIZED MUSCLE WEAKNESS: ICD-10-CM

## 2024-03-29 DIAGNOSIS — M96.1 POSTLAMINECTOMY SYNDROME: ICD-10-CM

## 2024-03-29 DIAGNOSIS — M96.1 FAILED BACK SURGICAL SYNDROME: ICD-10-CM

## 2024-03-29 PROCEDURE — 97140 MANUAL THERAPY 1/> REGIONS: CPT | Mod: GP | Performed by: PHYSICAL THERAPIST

## 2024-03-29 PROCEDURE — 97110 THERAPEUTIC EXERCISES: CPT | Mod: GP | Performed by: PHYSICAL THERAPIST

## 2024-03-29 NOTE — TELEPHONE ENCOUNTER
[facility] Call Center    Phone Message    May a detailed message be left on voicemail: yes     Reason for Call: Other: Patient is having a pain flare up and is needing to leave the cities and drive to deal with a family end of life issue. Please send medication to the [facility].      Action Taken: Other: Pain    Travel Screening: Not Applicable
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
## 2024-04-01 ENCOUNTER — MYC REFILL (OUTPATIENT)
Dept: ANESTHESIOLOGY | Facility: CLINIC | Age: 54
End: 2024-04-01

## 2024-04-01 ENCOUNTER — OFFICE VISIT (OUTPATIENT)
Dept: INTERNAL MEDICINE | Facility: CLINIC | Age: 54
End: 2024-04-01
Payer: COMMERCIAL

## 2024-04-01 VITALS
SYSTOLIC BLOOD PRESSURE: 123 MMHG | DIASTOLIC BLOOD PRESSURE: 89 MMHG | OXYGEN SATURATION: 99 % | WEIGHT: 188.4 LBS | HEART RATE: 86 BPM | BODY MASS INDEX: 29.51 KG/M2

## 2024-04-01 DIAGNOSIS — Z23 NEED FOR PROPHYLACTIC VACCINATION AGAINST HEPATITIS B VIRUS: ICD-10-CM

## 2024-04-01 DIAGNOSIS — Z12.4 CERVICAL CANCER SCREENING: Primary | ICD-10-CM

## 2024-04-01 DIAGNOSIS — Z23 NEED FOR PROPHYLACTIC VACCINATION AGAINST HEPATITIS A: ICD-10-CM

## 2024-04-01 DIAGNOSIS — N89.8 VAGINAL DISCHARGE: ICD-10-CM

## 2024-04-01 DIAGNOSIS — M96.1 FAILED BACK SURGICAL SYNDROME: ICD-10-CM

## 2024-04-01 LAB
CLUE CELLS: NORMAL
TRICHOMONAS, WET PREP: NORMAL
WBC'S/HIGH POWER FIELD, WET PREP: NORMAL
YEAST, WET PREP: NORMAL

## 2024-04-01 PROCEDURE — 99000 SPECIMEN HANDLING OFFICE-LAB: CPT | Performed by: PATHOLOGY

## 2024-04-01 PROCEDURE — 87210 SMEAR WET MOUNT SALINE/INK: CPT | Performed by: PATHOLOGY

## 2024-04-01 PROCEDURE — 87624 HPV HI-RISK TYP POOLED RSLT: CPT | Performed by: INTERNAL MEDICINE

## 2024-04-01 PROCEDURE — 99214 OFFICE O/P EST MOD 30 MIN: CPT | Performed by: INTERNAL MEDICINE

## 2024-04-01 PROCEDURE — G0145 SCR C/V CYTO,THINLAYER,RESCR: HCPCS | Performed by: INTERNAL MEDICINE

## 2024-04-01 RX ORDER — PHENTERMINE HYDROCHLORIDE 15 MG/1
15 CAPSULE ORAL EVERY MORNING
Qty: 30 CAPSULE | Refills: 1 | Status: SHIPPED | OUTPATIENT
Start: 2024-04-01 | End: 2024-05-03

## 2024-04-01 RX ORDER — BUPRENORPHINE 10 UG/H
1 PATCH TRANSDERMAL WEEKLY
Qty: 4 PATCH | Refills: 1 | Status: SHIPPED | OUTPATIENT
Start: 2024-04-01 | End: 2024-06-05

## 2024-04-01 RX ORDER — HYDROMORPHONE HYDROCHLORIDE 2 MG/1
2 TABLET ORAL 2 TIMES DAILY PRN
Qty: 20 TABLET | Refills: 0 | OUTPATIENT
Start: 2024-04-01

## 2024-04-01 RX ORDER — HYDROMORPHONE HYDROCHLORIDE 2 MG/1
2 TABLET ORAL 2 TIMES DAILY PRN
Qty: 20 TABLET | Refills: 0 | Status: SHIPPED | OUTPATIENT
Start: 2024-04-01 | End: 2024-06-20

## 2024-04-01 NOTE — TELEPHONE ENCOUNTER
RN reviewed chart. Duplicate pended refill. Medication refill sent to pharmacy on 4/1/24 by Dr. Martinez.     Skye Jernigan RN

## 2024-04-01 NOTE — TELEPHONE ENCOUNTER
Refill request    Medication: buprenorphine (BUTRANS) 10 MCG/HR WK patch     Sig: Place 1 patch onto the skin once a week     Dispensed: 4  Refills: 1  SOLD to the pt on: 2/24/24 (Controlled Substance)      Last clinic appointment: 3/14/24  Next clinic appointment: Not scheduled.    Last Drug Screen Collected: 5/25/23  Controlled Substance Agreement signed: 5/25/23      Preferred pharmacy:    [address] [phone]     Refill request routed to the provider to review.     Mya Cash LPN

## 2024-04-01 NOTE — PROGRESS NOTES
"  Assessment & Plan         Cervical cancer screening  Inadequate exam today, consider repeating with smaller speculum  - Pap Screen with HPV - recommended age 30 - 65 years    Vaginal discharge  - Wet prep - Clinic Collect    BMI 29.0-29.9,adult  Okay with trial of topiramate/phentermine, will need to watch for worsening of tachycardia. I would consider adding metformin in future.  - phentermine 15 MG capsule; Take 1 capsule (15 mg) by mouth every morning      I spent a total of 30 minutes on the day of the visit.   Time spent by me doing chart review, history and exam, documentation and further activities per the note      BMI  Estimated body mass index is 29.51 kg/m  as calculated from the following:    Height as of 3/14/24: 1.702 m (5' 7\").    Weight as of this encounter: 85.5 kg (188 lb 6.4 oz).             Return in about 4 weeks (around 4/29/2024) for with me.    Barak Caballero is a 53 year old, presenting for the following health issues:  Repeat Pap Smear, Gyn Exam, Weight Loss, Results (Pt had mammogram completed, would like to discuss results), and Recheck Medication (Pt would like to discuss Wegovy. Insurance denied coverage for Wegovy, pt reports significant weight gain since stopping medication. Discussed Topamax with med management pharmacist. )      4/1/2024    11:53 AM   Additional Questions   Roomed by JAKE BENZ     Weight loss: Has tried phentermine before and had success but doesn't remember side effects, topiramate is listed as an allergy but she doesn't recall why, was down to 165 lbs on wegovy, was up to 2.4 mg, joints hurt more    Pap: no abnormals, reports recent yeast infection, no sexually active in several years, no abnormal bleeding                    Objective    /89 (BP Location: Right arm, Patient Position: Sitting, Cuff Size: Adult Large)   Pulse 86   Wt 85.5 kg (188 lb 6.4 oz)   LMP  (LMP Unknown)   SpO2 99%   BMI 29.51 kg/m    Body mass index is 29.51 " kg/m .  Physical Exam   GENERAL: alert and no distress  RESP: lungs clear to auscultation - no rales, rhonchi or wheezes  CV: regular rate and rhythm, normal S1 S2, no S3 or S4, no murmur, click or rub, no peripheral edema   (female): Vulva: No external lesions, normal hair distribution, no adenopathy  Vagina: Moist, pink, no abnormal discharge, well rugated, no lesions, urethra normal, perineum normal  Cervix: Unable to visualize 2/2 patient discomfort, vaginal pap collected, no visible lesions  MS: no gross musculoskeletal defects noted, no edema            Signed Electronically by: Ellyn Rivera MD

## 2024-04-04 ENCOUNTER — TELEPHONE (OUTPATIENT)
Dept: ANESTHESIOLOGY | Facility: CLINIC | Age: 54
End: 2024-04-04
Payer: COMMERCIAL

## 2024-04-04 ENCOUNTER — TELEPHONE (OUTPATIENT)
Dept: INTERNAL MEDICINE | Facility: CLINIC | Age: 54
End: 2024-04-04
Payer: COMMERCIAL

## 2024-04-04 LAB
BKR LAB AP GYN ADEQUACY: NORMAL
BKR LAB AP GYN INTERPRETATION: NORMAL
BKR LAB AP HPV REFLEX: NORMAL
BKR LAB AP PREVIOUS ABNORMAL: NORMAL
PATH REPORT.COMMENTS IMP SPEC: NORMAL
PATH REPORT.COMMENTS IMP SPEC: NORMAL
PATH REPORT.RELEVANT HX SPEC: NORMAL

## 2024-04-04 NOTE — TELEPHONE ENCOUNTER
Prior Authorization Approval    Medication: BUPRENORPHINE 10 MCG/HR TD PTWK  Authorization Effective Date: 3/5/2024  Authorization Expiration Date: 10/4/2024  Approved Dose/Quantity: 4/28  Reference #: [id number]   Insurance Company: [employer] - Phone [phone] Fax [phone]  Expected CoPay: $    CoPay Card Available:      Financial Assistance Needed:   Which Pharmacy is filling the prescription: [place], [place] - [address] [place] [address]  Pharmacy Notified: Yes  Patient Notified: Yes

## 2024-04-04 NOTE — TELEPHONE ENCOUNTER
PA Initiation    Medication: WEGOVY 2.4 MG/0.75ML SC SOAJ  Insurance Company: [facility] - Phone [phone] Fax [phone]  Pharmacy Filling the Rx: [place], [place] - [address] [place] [address]  Filling Pharmacy Phone:    Filling Pharmacy Fax:    Start Date: 4/4/2024

## 2024-04-04 NOTE — TELEPHONE ENCOUNTER
PA Initiation    Medication: BUPRENORPHINE 10 MCG/HR TD PTWK  Insurance Company: [employer] [place] - Phone [phone] Fax [phone]  Pharmacy Filling the Rx: [place], [place] - [address] [place] [address]  Filling Pharmacy Phone: [phone]  Filling Pharmacy Fax:    Start Date: 4/4/2024

## 2024-04-08 LAB
HUMAN PAPILLOMA VIRUS 16 DNA: NEGATIVE
HUMAN PAPILLOMA VIRUS 18 DNA: NEGATIVE
HUMAN PAPILLOMA VIRUS FINAL DIAGNOSIS: NORMAL
HUMAN PAPILLOMA VIRUS OTHER HR: NEGATIVE

## 2024-04-09 DIAGNOSIS — G47.00 INSOMNIA, UNSPECIFIED TYPE: ICD-10-CM

## 2024-04-09 DIAGNOSIS — M96.1 POSTLAMINECTOMY SYNDROME, LUMBAR REGION: ICD-10-CM

## 2024-04-09 DIAGNOSIS — M96.1 POSTLAMINECTOMY SYNDROME: ICD-10-CM

## 2024-04-09 RX ORDER — CYCLOBENZAPRINE HCL 10 MG
10 TABLET ORAL 3 TIMES DAILY PRN
Qty: 60 TABLET | Refills: 0 | Status: SHIPPED | OUTPATIENT
Start: 2024-04-09 | End: 2024-05-29

## 2024-04-09 NOTE — TELEPHONE ENCOUNTER
Refill request    Medication: cyclobenzaprine (FLEXERIL) 10 MG tablet     Sig: TAKE ONE TABLET BY MOUTH THREE TIMES A DAY AS NEEDED FOR MUSCLE SPASMS     Dispensed: 60  Refills: 0    Last prescribed to patient: 3/15/24 (Non controlled substance)    Last clinic appointment: 3/13/24  Next clinic appointment: Not scheduled.       Preferred pharmacy:    [facility] [address] [place] [address] [phone]     Refill request routed to the provider to review.     Mya Cash LPN

## 2024-04-17 NOTE — TELEPHONE ENCOUNTER
CELECOXIB 100MG CAPS       Last Written Prescription Date:  12-15-23  Last Fill Quantity: 60,   # refills: 2  Last Office Visit : 4-1-24  Future Office visit:  none    Routing refill request to provider for review/approval because:  Failed protocol: Failed Always Fail Criteria -       RAMELTEON 8MG TABS       Last Written Prescription Date:  9-26-23  Last Fill Quantity: 30,   # refills: 3    Routing refill request to provider for review/approval because:  Drug not on the [facility], [facility] or [facility] refill protocol or controlled substance

## 2024-04-17 NOTE — TELEPHONE ENCOUNTER
Prior Authorization Approval    Medication: WEGOVY 2.4 MG/0.75ML SC SOAJ  Authorization Effective Date: 3/18/2024  Authorization Expiration Date: 4/17/2025  Approved Dose/Quantity: 3/28  Reference #: [id number]   Insurance Company: [facility] - Phone [phone] Fax [phone]  Expected CoPay: $    CoPay Card Available:      Financial Assistance Needed:   Which Pharmacy is filling the prescription: [facility], [place] - [address] [place] [address]  Pharmacy Notified: Yes  Patient Notified: Yes

## 2024-04-22 ENCOUNTER — TELEPHONE (OUTPATIENT)
Dept: OPHTHALMOLOGY | Facility: CLINIC | Age: 54
End: 2024-04-22
Payer: COMMERCIAL

## 2024-04-24 RX ORDER — CELECOXIB 100 MG/1
CAPSULE ORAL
Qty: 60 CAPSULE | Refills: 2 | Status: SHIPPED | OUTPATIENT
Start: 2024-04-24 | End: 2024-08-07

## 2024-04-25 ENCOUNTER — VIRTUAL VISIT (OUTPATIENT)
Dept: PHARMACY | Facility: CLINIC | Age: 54
End: 2024-04-25
Payer: COMMERCIAL

## 2024-04-25 ENCOUNTER — OFFICE VISIT (OUTPATIENT)
Dept: OPHTHALMOLOGY | Facility: CLINIC | Age: 54
End: 2024-04-25
Payer: COMMERCIAL

## 2024-04-25 DIAGNOSIS — R73.03 PREDIABETES: Primary | ICD-10-CM

## 2024-04-25 DIAGNOSIS — H53.9 VISION CHANGES: ICD-10-CM

## 2024-04-25 DIAGNOSIS — H52.4 PRESBYOPIA OF BOTH EYES: Primary | ICD-10-CM

## 2024-04-25 DIAGNOSIS — Q79.60 EHLERS-DANLOS SYNDROME: ICD-10-CM

## 2024-04-25 DIAGNOSIS — H43.393 VITREOUS SYNERESIS OF BOTH EYES: ICD-10-CM

## 2024-04-25 DIAGNOSIS — H04.123 DRY EYE SYNDROME OF BOTH EYES: ICD-10-CM

## 2024-04-25 DIAGNOSIS — R51.9 CHRONIC INTRACTABLE HEADACHE, UNSPECIFIED HEADACHE TYPE: ICD-10-CM

## 2024-04-25 DIAGNOSIS — G89.29 CHRONIC INTRACTABLE HEADACHE, UNSPECIFIED HEADACHE TYPE: ICD-10-CM

## 2024-04-25 PROCEDURE — 92004 COMPRE OPH EXAM NEW PT 1/>: CPT | Performed by: OPTOMETRIST

## 2024-04-25 PROCEDURE — 99207 PR NO CHARGE LOS: CPT | Mod: 93 | Performed by: PHARMACIST

## 2024-04-25 PROCEDURE — 92015 DETERMINE REFRACTIVE STATE: CPT | Performed by: OPTOMETRIST

## 2024-04-25 RX ORDER — RAMELTEON 8 MG/1
8 TABLET ORAL AT BEDTIME
Qty: 30 TABLET | Refills: 3 | Status: SHIPPED | OUTPATIENT
Start: 2024-04-25

## 2024-04-25 ASSESSMENT — TONOMETRY
OD_IOP_MMHG: 13
OS_IOP_MMHG: 14
IOP_METHOD: ICARE

## 2024-04-25 ASSESSMENT — REFRACTION_WEARINGRX
SPECS_TYPE: DIGITAL PAL
OS_AXIS: 107
OS_CYLINDER: +0.75
OD_ADD: 1.48
OD_AXIS: 090
OS_SPHERE: PLANO
OD_CYLINDER: +0.75
OD_SPHERE: +0.25
OS_ADD: 1.48

## 2024-04-25 ASSESSMENT — REFRACTION_MANIFEST
OD_ADD: +1.75
OD_CYLINDER: +0.25
OD_SPHERE: +0.25
OS_CYLINDER: +0.25
OS_AXIS: 105
METHOD_AUTOREFRACTION: 1
OD_SPHERE: +0.25
OS_SPHERE: +0.25
OS_CYLINDER: +0.50
OS_AXIS: 170
OD_CYLINDER: +0.50
OS_SPHERE: +0.75
OD_AXIS: 100
OD_AXIS: 107
OS_ADD: +1.75

## 2024-04-25 ASSESSMENT — CONF VISUAL FIELD
OS_INFERIOR_TEMPORAL_RESTRICTION: 0
OS_SUPERIOR_NASAL_RESTRICTION: 0
METHOD: COUNTING FINGERS
OD_SUPERIOR_NASAL_RESTRICTION: 0
OS_NORMAL: 1
OD_INFERIOR_NASAL_RESTRICTION: 0
OD_NORMAL: 1
OD_INFERIOR_TEMPORAL_RESTRICTION: 0
OS_SUPERIOR_TEMPORAL_RESTRICTION: 0
OS_INFERIOR_NASAL_RESTRICTION: 0
OD_SUPERIOR_TEMPORAL_RESTRICTION: 0

## 2024-04-25 ASSESSMENT — EXTERNAL EXAM - LEFT EYE: OS_EXAM: NORMAL

## 2024-04-25 ASSESSMENT — VISUAL ACUITY
OS_CC: 20/20
METHOD: SNELLEN - LINEAR
CORRECTION_TYPE: GLASSES
OS_CC+: -2
OD_CC: 20/20
OD_CC+: -1

## 2024-04-25 ASSESSMENT — CUP TO DISC RATIO
OD_RATIO: 0.5
OS_RATIO: 0.5

## 2024-04-25 ASSESSMENT — EXTERNAL EXAM - RIGHT EYE: OD_EXAM: NORMAL

## 2024-04-25 NOTE — NURSING NOTE
"Chief Complaints and History of Present Illnesses   Patient presents with    Vision Changes Ou     Ada Welch is being seen for a consult today by the request of Dr. Rivera for vision changes.       Chief Complaint(s) and History of Present Illness(es)       Vision Changes Ou              Laterality: both eyes    Quality: blurred vision    Context: distance vision and near vision    Associated symptoms: dryness and floaters (dark spots, OU, moves with vision, present for years, more of them recently.).  Negative for eye pain, tearing and flashes (\"like a camera flash when i blink\", happens a few times a week; unsure how long this has been present)    Treatments tried: artificial tears    Pain scale: 0/10    Comments: Ada Welch is being seen for a consult today by the request of Dr. Rivera for vision changes.                Comments    Pt is using Systane AT 1-2x day - pt notes that she has blurred vision in both eyes, she notes that \"my vision has massivly changed since last time\" last eye exam was a few years ago., she notes that the blurred VA is constant and that it can be worse at times than other times, she states she has ocular migraines, denies pain, and that it lasts for weeks, because it takes me a long time to realize I have a migraine.     Sophie SHERIDAN April 25, 2024 1:30 PM                       "

## 2024-04-25 NOTE — PROGRESS NOTES
"Medication Therapy Management (MTM) Encounter    ASSESSMENT:                            Medication Adherence/Access: No issues identified    Weight Loss/Prediabetes:   For further weight loss patient could benefit from an increase in phentermine as her blood pressure has been stable.  May need to decrease topiramate at some point due to brain fog.    PLAN:                            I will discuss with Dr. Rivera about increasing the dose of phentermine.   If the brain fog gets too bothersome you can decrease topiramate to 50 mg.    Follow-up: Return in about 3 months (around 7/25/2024).    SUBJECTIVE/OBJECTIVE:                          Ada Welch is a 53 year old female called for a follow-up visit.       Reason for visit: topiramate follow-up .    Allergies/ADRs: Reviewed in chart  Past Medical History: Reviewed in chart  Tobacco: She reports that she quit smoking about 10 years ago. Her smoking use included cigarettes. She started smoking about 33 years ago. She has a 4.6 pack-year smoking history. She has never used smokeless tobacco.  Alcohol: not discussed today    Medication Adherence/Access: no issues reported    Weight Loss/Prediabetes:   Topiramate 75 mg daily - reports she is having \"definite brain fog\"  Phentermine 15 mg daily      Patient reports she stopped taking Wegovy because insurance coverage was changed at the beginning of the year.     Reports blood pressure has been in the low 100's/70's mmHg    BP Readings from Last 3 Encounters:   04/01/24 123/89   03/14/24 117/81   02/08/24 128/83       Pulse Readings from Last 3 Encounters:   04/01/24 86   03/14/24 76   02/08/24 107     Reports her weight is 194 lb now.     Wt Readings from Last 4 Encounters:   04/01/24 188 lb 6.4 oz (85.5 kg)   03/14/24 179 lb (81.2 kg)   12/15/23 179 lb (81.2 kg)   09/21/23 198 lb (89.8 kg)       Today's Vitals: LMP  (LMP Unknown)   ----------------      I spent 13 minutes with this patient today. All changes were " made via collaborative practice agreement with Ellyn Rivera MD. A copy of the visit note was provided to the patient's provider(s).    A summary of these recommendations was sent via [employer].    Kirk Burger, Pharm. D., [facility]  Medication Therapy Management Pharmacist      Telemedicine Visit Details  Type of service:  Telephone visit  Start Time:  10:02 am  End Time:  10:15 am     Medication Therapy Recommendations  BMI 31.0-31.9,adult    Current Medication: phentermine 15 MG capsule   Rationale: Dose too low - Dosage too low - Effectiveness   Recommendation: Increase Dose   Status: Contact Provider - Awaiting Response          Rationale: Untreated condition - Needs additional medication therapy - Indication   Recommendation: Start Medication - phentermine 15 MG capsule   Status: Accepted per Provider

## 2024-04-25 NOTE — Clinical Note
Sent separate staff message about increasing phentermine to 30 mg.  Kirk Burger, Pharm. D., [facility] Medication Therapy Management Pharmacist

## 2024-04-25 NOTE — PROGRESS NOTES
"History  HPI       Vision Changes Ou    In both eyes.  Charactertized as  blurred vision.  Context:  distance vision and near vision.  Associated symptoms include dryness and floaters (dark spots, OU, moves with vision, present for years, more of them recently.).  Negative for eye pain, tearing and flashes (\"like a camera flash when i blink\", happens a few times a week; unsure how long this has been present).  Treatments tried include artificial tears.  Pain was noted as 0/10. Additional comments: Ada Welch is being seen for a consult today by the request of Dr. Rivera for vision changes.               Comments    Pt is using Systane AT 1-2x day - pt notes that she has blurred vision in both eyes, she notes that \"my vision has massivly changed since last time\" last eye exam was a few years ago., she notes that the blurred VA is constant and that it can be worse at times than other times, she states she has ocular migraines, denies pain, and that it lasts for weeks, because it takes me a long time to realize I have a migraine.     Sophie Lewis COT April 25, 2024 1:30 PM              Last edited by Paloma Lewis on 4/25/2024  1:34 PM.          Assessment/Plan  (H52.4) Presbyopia of both eyes  (primary encounter diagnosis)  (H53.9) Vision changes  Comment: Hyperopic astigmatism with presbyopia both eyes, symptomatic with blur at near  Plan: REFRACTION         Educated patient on condition and clinical findings. Dispensed spectacle prescriptions for full distance and near. Monitor annually.   Copy of chart sent to Dr. Rivera.    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    (H04.123) Dry eye syndrome of both eyes  Comment: Symptomatic with dryness  Plan:  Recommended artificial tears twice each day both eyes. Monitor as needed.    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 If symptoms present, return to clinic immediately.    (R51.9,  G89.29) Chronic intractable headache, unspecified headache type  Comment: Migraines, currently treated with Maxalt, uses artificial tears more frequently when experiencing symptoms, noting more break-through headaches recently  Plan:  Recommended discussing symptoms with headache specialist as scheduled.    Return to clinic in 1 year for comprehensive eye exam.    Complete documentation of historical and exam elements from today's encounter can  be found in the full encounter summary report (not reduplicated in this progress  note). I personally obtained the chief complaint(s) and history of present illness. I  confirmed and edited as necessary the review of systems, past medical/surgical  history, family history, social history, and examination findings as documented by  others; and I examined the patient myself. I personally reviewed the relevant tests,  images, and reports as documented above. I formulated and edited as necessary the  assessment and plan and discussed the findings and management plan with the  patient and family.    Michel Smith OD, FAAO

## 2024-04-29 NOTE — PATIENT INSTRUCTIONS
"Recommendations from today's MTM visit:                                                    MTM (medication therapy management) is a service provided by a clinical pharmacist designed to help you get the most of out of your medicines.   Today we reviewed what your medicines are for, how to know if they are working, that your medicines are safe and how to make your medicine regimen as easy as possible.    I will discuss with Dr. Rivera about increasing the dose of phentermine.   If the brain fog gets too bothersome you can decrease topiramate to 50 mg.    Follow-up: Return in about 3 months (around 7/25/2024).    It was great speaking with you today.  I value your experience and would be very thankful for your time in providing feedback in our clinic survey. In the next few days, you may receive an email or text message from [employer] with a link to a survey related to your  clinical pharmacist.\"     To schedule another MTM appointment, please call the clinic directly or you may call the MTM scheduling line at [phone] or toll-free at [phone].     My Clinical Pharmacist's contact information:                                                      Please feel free to contact me with any questions or concerns you have.      Kirk Burger, Pharm. D., [facility]  Medication Therapy Management Pharmacist    "

## 2024-04-30 ENCOUNTER — PRE VISIT (OUTPATIENT)
Dept: NEUROSURGERY | Facility: CLINIC | Age: 54
End: 2024-04-30

## 2024-05-03 ENCOUNTER — MYC MEDICAL ADVICE (OUTPATIENT)
Dept: INTERNAL MEDICINE | Facility: CLINIC | Age: 54
End: 2024-05-03

## 2024-05-03 ENCOUNTER — OFFICE VISIT (OUTPATIENT)
Dept: INTERNAL MEDICINE | Facility: CLINIC | Age: 54
End: 2024-05-03
Payer: COMMERCIAL

## 2024-05-03 ENCOUNTER — THERAPY VISIT (OUTPATIENT)
Dept: PHYSICAL THERAPY | Facility: CLINIC | Age: 54
End: 2024-05-03
Payer: COMMERCIAL

## 2024-05-03 VITALS
SYSTOLIC BLOOD PRESSURE: 105 MMHG | OXYGEN SATURATION: 98 % | WEIGHT: 192.6 LBS | HEIGHT: 67 IN | DIASTOLIC BLOOD PRESSURE: 71 MMHG | BODY MASS INDEX: 30.23 KG/M2 | HEART RATE: 72 BPM

## 2024-05-03 DIAGNOSIS — G43.719 INTRACTABLE CHRONIC MIGRAINE WITHOUT AURA AND WITHOUT STATUS MIGRAINOSUS: ICD-10-CM

## 2024-05-03 DIAGNOSIS — M62.81 GENERALIZED MUSCLE WEAKNESS: ICD-10-CM

## 2024-05-03 DIAGNOSIS — Q79.60 EHLERS-DANLOS SYNDROME: Primary | ICD-10-CM

## 2024-05-03 PROCEDURE — 97140 MANUAL THERAPY 1/> REGIONS: CPT | Mod: GP | Performed by: PHYSICAL THERAPIST

## 2024-05-03 PROCEDURE — 99214 OFFICE O/P EST MOD 30 MIN: CPT | Performed by: INTERNAL MEDICINE

## 2024-05-03 PROCEDURE — G2211 COMPLEX E/M VISIT ADD ON: HCPCS | Performed by: INTERNAL MEDICINE

## 2024-05-03 RX ORDER — PHENTERMINE HYDROCHLORIDE 30 MG/1
30 CAPSULE ORAL EVERY MORNING
Qty: 30 CAPSULE | Refills: 1 | Status: SHIPPED | OUTPATIENT
Start: 2024-05-03 | End: 2024-08-06

## 2024-05-03 RX ORDER — BUTALBITAL, ACETAMINOPHEN AND CAFFEINE 50; 325; 40 MG/1; MG/1; MG/1
1 TABLET ORAL EVERY 6 HOURS PRN
Qty: 10 TABLET | Refills: 0 | Status: SHIPPED | OUTPATIENT
Start: 2024-05-03 | End: 2024-05-06

## 2024-05-03 RX ORDER — TOPIRAMATE 100 MG/1
100 TABLET, FILM COATED ORAL DAILY
Qty: 90 TABLET | Refills: 3 | Status: SHIPPED | OUTPATIENT
Start: 2024-05-03

## 2024-05-03 NOTE — PROGRESS NOTES
"  Assessment & Plan     BMI 31.0-31.9,adult  Okay to uptitrate   - topiramate (TOPAMAX) 100 MG tablet; Take 1 tablet (100 mg) by mouth daily    BMI 29.0-29.9,adult  Tolerating well, no side effects, will increase dose  - phentermine 30 MG capsule; Take 1 capsule (30 mg) by mouth every morning    Intractable chronic migraine without aura and without status migrainosus  Advised she can also try reglan, if no improvement discuss with neurologist  - butalbital-acetaminophen-caffeine (ESGIC) -40 MG tablet; Take 1 tablet by mouth every 6 hours as needed for headaches        The longitudinal plan of care for the diagnosis(es)/condition(s) as documented were addressed during this visit. Due to the added complexity in care, I will continue to support Ada in the subsequent management and with ongoing continuity of care.      BMI  Estimated body mass index is 30.17 kg/m  as calculated from the following:    Height as of this encounter: 1.702 m (5' 7\").    Weight as of this encounter: 87.4 kg (192 lb 9.6 oz).             Return in about 8 weeks (around 6/28/2024) for using a video visit, f/u weight management medications.    Subjective   Ada is a 53 year old, presenting for the following health issues:  Follow Up ([place]eg[place])      5/3/2024    10:17 AM   Additional Questions   Roomed by MVO, EMT     History of Present Illness       Diabetes:   She presents for follow up of diabetes.    She is not checking blood glucose.        She is concerned about other.   She is having numbness in feet, burning in feet, excessive thirst, blurry vision and weight gain.            Hypothyroidism:     Since last visit, patient describes the following symptoms::  Constipation, Fatigue, Hair loss, Loose stools, Tremors and Weight gain    Weight gain::  6-10 lbs.    Headaches:   Since the patient's last clinic visit, headaches are: worsened  The patient is getting headaches:  Every day  She is not able to do normal daily activities when " "she has a migraine.  The patient is taking the following rescue/relief medications:  Ibuprofen (Advil, Motrin), Naproxyn (Aleve), Tylenol, sumatriptan (Imitrex) and Maxalt   Patient states \"I get no relief\" from the rescue/relief medications.   The patient is taking the following medications to prevent migraines:  Topomax and other  In the past 4 weeks, the patient has gone to an Urgent Care or Emergency Room 0 times times due to headaches.    Reason for visit:  Follow up on weight gain, headaches, thyroid    She eats 4 or more servings of fruits and vegetables daily.She consumes 0 sweetened beverage(s) daily.She exercises with enough effort to increase her heart rate 20 to 29 minutes per day.  She exercises with enough effort to increase her heart rate 4 days per week.   She is taking medications regularly.       Weight loss: In interim, she has tried phentermine and topiramate, tolerating well, no clear side effects, was down to 165 lbs on wegovy, from 196 lbs, lost 4 lbs in since starting phentermine    Pap: no abnormals, reports recent yeast infection, no sexually active in several years, no abnormal bleeding    HA: constant, ongoing for several days, bilat, wraps into face and back of head, sees neurology, has tried triptans                   Objective    LMP  (LMP Unknown)   There is no height or weight on file to calculate BMI.  Physical Exam   GENERAL: alert and no distress  NECK: no adenopathy, no asymmetry, masses, or scars  RESP: lungs clear to auscultation - no rales, rhonchi or wheezes  CV: regular rate and rhythm, normal S1 S2, no S3 or S4, no murmur, click or rub, no peripheral edema  MS: no gross musculoskeletal defects noted, no edema            Signed Electronically by: Ellyn Rivera MD    "

## 2024-05-04 ENCOUNTER — HEALTH MAINTENANCE LETTER (OUTPATIENT)
Age: 54
End: 2024-05-04

## 2024-05-06 RX ORDER — BUTALBITAL, ACETAMINOPHEN AND CAFFEINE 50; 325; 40 MG/1; MG/1; MG/1
1 TABLET ORAL EVERY 6 HOURS PRN
Qty: 10 TABLET | Refills: 0 | Status: SHIPPED | OUTPATIENT
Start: 2024-05-06

## 2024-05-13 ENCOUNTER — MYC MEDICAL ADVICE (OUTPATIENT)
Dept: INTERNAL MEDICINE | Facility: CLINIC | Age: 54
End: 2024-05-13
Payer: COMMERCIAL

## 2024-05-13 DIAGNOSIS — R73.01 IMPAIRED FASTING GLUCOSE: Primary | ICD-10-CM

## 2024-05-16 NOTE — TELEPHONE ENCOUNTER
Left Voicemail (1st Attempt) and Sent Mychart (1st Attempt) for the patient to call back and schedule the following:    Appointment type: lab  Provider: per PCP  Return date: any

## 2024-05-21 ENCOUNTER — TELEPHONE (OUTPATIENT)
Dept: NEUROLOGY | Facility: CLINIC | Age: 54
End: 2024-05-21

## 2024-05-21 ENCOUNTER — VIRTUAL VISIT (OUTPATIENT)
Dept: NEUROLOGY | Facility: CLINIC | Age: 54
End: 2024-05-21
Payer: COMMERCIAL

## 2024-05-21 VITALS — BODY MASS INDEX: 29.51 KG/M2 | HEIGHT: 67 IN | WEIGHT: 188 LBS

## 2024-05-21 DIAGNOSIS — G96.00 CEREBROSPINAL FLUID LEAK: ICD-10-CM

## 2024-05-21 DIAGNOSIS — G43.719 INTRACTABLE CHRONIC MIGRAINE WITHOUT AURA AND WITHOUT STATUS MIGRAINOSUS: Primary | ICD-10-CM

## 2024-05-21 PROCEDURE — 99214 OFFICE O/P EST MOD 30 MIN: CPT | Mod: 95 | Performed by: PSYCHIATRY & NEUROLOGY

## 2024-05-21 ASSESSMENT — MIGRAINE DISABILITY ASSESSMENT (MIDAS)
ON A SCALE FROM 0-10 ON AVERAGE HOW PAINFUL WERE HEADACHES: 7
HOW MANY DAYS WAS YOUR PRODUCTIVITY CUT IN HALF BECAUSE OF HEADACHES: 0
HOW MANY DAYS WAS HOUSEWORK PRODUCTIVITY CUT IN HALF DUE TO HEADACHES: 0
HOW MANY DAYS DID YOU NOT DO HOUSEWORK BECAUSE OF HEADACHES: 90
HOW MANY DAYS IN THE PAST 3 MONTHS HAVE YOU HAD A HEADACHE: 90
HOW MANY DAYS DID YOU MISS WORK OR SCHOOL BECAUSE OF HEADACHES: 0
TOTAL SCORE: 180
HOW OFTEN WERE SOCIAL ACTIVITIES MISSED DUE TO HEADACHES: 90

## 2024-05-21 ASSESSMENT — HEADACHE IMPACT TEST (HIT 6)
HOW OFTEN DID HEADACHS LIMIT CONCENTRATION ON WORK OR DAILY ACTIVITY: ALWAYS
HOW OFTEN DO HEADACHES LIMIT YOUR DAILY ACTIVITIES: VERY OFTEN
WHEN YOU HAVE HEADACHES HOW OFTEN IS THE PAIN SEVERE: ALWAYS
HIT6 TOTAL SCORE: 76
HOW OFTEN HAVE YOU FELT FED UP OR IRRITATED BECAUSE OF YOUR HEADACHES: ALWAYS
HOW OFTEN DO HEADACHES LIMIT YOUR DAILY ACTIVITIES: VERY OFTEN
HOW OFTEN HAVE YOU FELT FED UP OR IRRITATED BECAUSE OF YOUR HEADACHES: ALWAYS
WHEN YOU HAVE A HEADACHE HOW OFTEN DO YOU WISH YOU COULD LIE DOWN: ALWAYS
HOW OFTEN HAVE YOU FELT TOO TIRED TO WORK BECAUSE OF YOUR HEADACHES: ALWAYS
HOW OFTEN DID HEADACHS LIMIT CONCENTRATION ON WORK OR DAILY ACTIVITY: ALWAYS
HOW OFTEN HAVE YOU FELT TOO TIRED TO WORK BECAUSE OF YOUR HEADACHES: ALWAYS
WHEN YOU HAVE HEADACHES HOW OFTEN IS THE PAIN SEVERE: ALWAYS
HIT6 TOTAL SCORE: 76
WHEN YOU HAVE A HEADACHE HOW OFTEN DO YOU WISH YOU COULD LIE DOWN: ALWAYS

## 2024-05-21 ASSESSMENT — PAIN SCALES - GENERAL: PAINLEVEL: SEVERE PAIN (7)

## 2024-05-21 NOTE — PROGRESS NOTES
Virtual Visit Details    Type of service:  Video Visit     Originating Location (pt. Location): Home    Distant Location (provider location):  On-site  Platform used for Video Visit: Sobeida

## 2024-05-21 NOTE — TELEPHONE ENCOUNTER
Spoke to our imaging scheduling - they are unable to schedule MRI until mid June. Dr. Nguyen is aware and is wanting this to be completed within 7 days if possible.     MRI [facility] orders faxed to #[phone]. Patient contacted and updated on plan. I will send [employer] locations & scheduling numbers to have this completed. She states she will call this afternoon to set up. She is to let us know once completed.

## 2024-05-21 NOTE — LETTER
5/21/2024       RE: Ada Welch  [address] [address]       Dear Colleague,    Thank you for referring your patient, Ada Welch, to the [facility]UR[facility] [place] at [facility]. Please see a copy of my visit note below.    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 I am concerned she has one again in [facility] to f the headache being worse being upright. She may need a blood patch. First we will send for imaging of spine to see if we can find a target. If not will blind blood patch. Will also decrease topiramate considering it would lower CSF pressure.   MRI brain, MRI Cervical spine w & w/o contrast, MR Thoracic Spine w/o & w Contrast, MR Lumbar Spine w/o & w Contrast          Noemy Nguyen MD           History of Present Illness:   CC: Recheck    Ada is seen for an intractable headache that started 5 weeks ago.   She stopped Wegovy due to lack of insurance. She wonders if that triggers the headache. No other changes, no truama, no falls  The good news is she is a new grandma.     New headache started 5 weeks ago, has not responded to Nurtec, rizatriptan, ibuprofen, Tylenol, Imitrex stat dose, butalbitol, headache hat, cannabis balm/edibles. She has been going to PT  Pain going from back of head up  Vision is impaired-Weird peripheral vision changes,  more floaters, shadows in periphery  Light sensitive  Nausea  Cough or sneezing makes pain worse  Worse with being upright and the pain builds  If she lays flat it takes awhile to feel better but it does help. Can take 3-4  hours.     Previous treatment trials:  amitriptyline, zonisamide, gabapentin, Aimovig, Emgality, duloxetine, tizanidine, propranolol     She is on topiramate           Physical Exam:     Unable to examine due to virtual         Pertinent History/Data for appointment:     Had head imaging in October-stable.         Again, thank you for allowing me to participate in the care of your patient.      Sincerely,    Noemy Nguyen MD

## 2024-05-21 NOTE — PATIENT INSTRUCTIONS
Decrease topiramate to 50 mg daily for 3 days then stop to help mental confusion and it lowers CSF pressure

## 2024-05-21 NOTE — PROGRESS NOTES
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 I am concerned she has one again in [facility] to f the headache being worse being upright. She may need a blood patch. First we will send for imaging of spine to see if we can find a target. If not will blind blood patch. Will also decrease topiramate considering it would lower CSF pressure.   MRI brain, MRI Cervical spine w & w/o contrast, MR Thoracic Spine w/o & w Contrast, MR Lumbar Spine w/o & w Contrast          Noemy Nguyen MD           History of Present Illness:   CC: Recheck    Ada is seen for an intractable headache that started 5 weeks ago.   She stopped Wegovy due to lack of insurance. She wonders if that triggers the headache. No other changes, no truama, no falls  The good news is she is a new grandma.     New headache started 5 weeks ago, has not responded to Nurtec, rizatriptan, ibuprofen, Tylenol, Imitrex stat dose, butalbitol, headache hat, cannabis balm/edibles. She has been going to PT  Pain going from back of head up  Vision is impaired-Weird peripheral vision changes,  more floaters, shadows in periphery  Light sensitive  Nausea  Cough or sneezing makes pain worse  Worse with being upright and the pain builds  If she lays flat it takes awhile to feel better but it does help. Can take 3-4 hours.     Previous treatment trials:  amitriptyline, zonisamide, gabapentin, Aimovig, Emgality, duloxetine, tizanidine, propranolol     She is on topiramate           Physical Exam:     Unable to examine due to virtual         Pertinent History/Data for appointment:     Had head imaging in October-stable.

## 2024-05-21 NOTE — NURSING NOTE
Is the patient currently in the state of [place]? YES    Visit mode:VIDEO    If the visit is dropped, the patient can be reconnected by: VIDEO VISIT: Text to cell phone:   Telephone Information:   Mobile [phone]       Will anyone else be joining the visit? NO  (If patient encounters technical issues they should call [phone]    How would you like to obtain your AVS? MyChart    Are changes needed to the allergy or medication list? No    Are refills needed on medications prescribed by this physician? NO    Reason for visit: SARAI TREVINO

## 2024-05-24 ENCOUNTER — LAB (OUTPATIENT)
Dept: LAB | Facility: CLINIC | Age: 54
End: 2024-05-24
Payer: COMMERCIAL

## 2024-05-24 ENCOUNTER — THERAPY VISIT (OUTPATIENT)
Dept: PHYSICAL THERAPY | Facility: CLINIC | Age: 54
End: 2024-05-24
Payer: COMMERCIAL

## 2024-05-24 DIAGNOSIS — E78.2 MIXED HYPERLIPIDEMIA: Primary | ICD-10-CM

## 2024-05-24 DIAGNOSIS — R73.01 IMPAIRED FASTING GLUCOSE: ICD-10-CM

## 2024-05-24 DIAGNOSIS — Q79.60 EHLERS-DANLOS SYNDROME: Primary | ICD-10-CM

## 2024-05-24 DIAGNOSIS — M62.81 GENERALIZED MUSCLE WEAKNESS: ICD-10-CM

## 2024-05-24 DIAGNOSIS — Q79.60 EDS (EHLERS-DANLOS SYNDROME): ICD-10-CM

## 2024-05-24 LAB — HBA1C MFR BLD: 5.4 % (ref 0–5.6)

## 2024-05-24 PROCEDURE — 97140 MANUAL THERAPY 1/> REGIONS: CPT | Mod: GP | Performed by: PHYSICAL THERAPIST

## 2024-05-24 PROCEDURE — 80053 COMPREHEN METABOLIC PANEL: CPT

## 2024-05-24 PROCEDURE — 80061 LIPID PANEL: CPT

## 2024-05-24 PROCEDURE — 97110 THERAPEUTIC EXERCISES: CPT | Mod: GP | Performed by: PHYSICAL THERAPIST

## 2024-05-24 PROCEDURE — 83036 HEMOGLOBIN GLYCOSYLATED A1C: CPT

## 2024-05-24 PROCEDURE — 36415 COLL VENOUS BLD VENIPUNCTURE: CPT

## 2024-05-25 LAB
ALBUMIN SERPL BCG-MCNC: 4.6 G/DL (ref 3.5–5.2)
ALP SERPL-CCNC: 110 U/L (ref 40–150)
ALT SERPL W P-5'-P-CCNC: 26 U/L (ref 0–50)
ANION GAP SERPL CALCULATED.3IONS-SCNC: 11 MMOL/L (ref 7–15)
AST SERPL W P-5'-P-CCNC: 30 U/L (ref 0–45)
BILIRUB SERPL-MCNC: 0.6 MG/DL
BUN SERPL-MCNC: 11.8 MG/DL (ref 6–20)
CALCIUM SERPL-MCNC: 9 MG/DL (ref 8.6–10)
CHLORIDE SERPL-SCNC: 109 MMOL/L (ref 98–107)
CHOLEST SERPL-MCNC: 189 MG/DL
CREAT SERPL-MCNC: 0.85 MG/DL (ref 0.51–0.95)
DEPRECATED HCO3 PLAS-SCNC: 21 MMOL/L (ref 22–29)
EGFRCR SERPLBLD CKD-EPI 2021: 82 ML/MIN/1.73M2
FASTING STATUS PATIENT QL REPORTED: NO
FASTING STATUS PATIENT QL REPORTED: NO
GLUCOSE SERPL-MCNC: 104 MG/DL (ref 70–99)
HDLC SERPL-MCNC: 62 MG/DL
LDLC SERPL CALC-MCNC: 101 MG/DL
NONHDLC SERPL-MCNC: 127 MG/DL
POTASSIUM SERPL-SCNC: 4 MMOL/L (ref 3.4–5.3)
PROT SERPL-MCNC: 6.9 G/DL (ref 6.4–8.3)
SODIUM SERPL-SCNC: 141 MMOL/L (ref 135–145)
TRIGL SERPL-MCNC: 130 MG/DL

## 2024-05-27 ENCOUNTER — MYC MEDICAL ADVICE (OUTPATIENT)
Dept: INTERNAL MEDICINE | Facility: CLINIC | Age: 54
End: 2024-05-27
Payer: COMMERCIAL

## 2024-05-28 ENCOUNTER — OFFICE VISIT (OUTPATIENT)
Dept: NEUROLOGY | Facility: CLINIC | Age: 54
End: 2024-05-28
Payer: COMMERCIAL

## 2024-05-28 DIAGNOSIS — G43.719 INTRACTABLE CHRONIC MIGRAINE WITHOUT AURA AND WITHOUT STATUS MIGRAINOSUS: Primary | ICD-10-CM

## 2024-05-28 DIAGNOSIS — M96.1 POSTLAMINECTOMY SYNDROME: ICD-10-CM

## 2024-05-28 PROCEDURE — 64615 CHEMODENERV MUSC MIGRAINE: CPT | Performed by: PSYCHIATRY & NEUROLOGY

## 2024-05-28 ASSESSMENT — HEADACHE IMPACT TEST (HIT 6)
WHEN YOU HAVE HEADACHES HOW OFTEN IS THE PAIN SEVERE: VERY OFTEN
HOW OFTEN HAVE YOU FELT TOO TIRED TO WORK BECAUSE OF YOUR HEADACHES: VERY OFTEN
HOW OFTEN DID HEADACHS LIMIT CONCENTRATION ON WORK OR DAILY ACTIVITY: ALWAYS
HOW OFTEN DO HEADACHES LIMIT YOUR DAILY ACTIVITIES: VERY OFTEN
HOW OFTEN HAVE YOU FELT FED UP OR IRRITATED BECAUSE OF YOUR HEADACHES: ALWAYS
HIT6 TOTAL SCORE: 72
WHEN YOU HAVE A HEADACHE HOW OFTEN DO YOU WISH YOU COULD LIE DOWN: ALWAYS

## 2024-05-28 NOTE — LETTER
5/28/2024       RE: Ada Welch  [address] [address]     Dear Colleague,    Thank you for referring your patient, Ada Welch, to the [facility] NEUROLOGY CLINIC [place] at [facility]. Please see a copy of my visit note below.    Botulinum Toxin Procedure Note     Ada Welch  [id number]     Ordering provider: Noemy Nguyen MD     The procedure was explained to the patient. Benefits of the treatment were discussed including headache and migraine reduction. Risks of the procedure were reviewed including but not limited to pain, bruising, bleeding, infection, weakness of muscles injected or those distal to injection. The patient voiced understanding of the risks and benefits. All questions answered and the patient consented to proceed.      Prior to receiving Botox injections the patient reported the following:  Baseline headache/migraine frequency: more than 15 days a month  Baseline headache/migraine duration: more than 4 hours  Associated migrainous features: wavy vision distortion, nausea, photophobia     Previous treatment trials:  amitriptyline, zonisamide, gabapentin, Aimovig, Emgality, duloxetine, tizanidine, propranolol     Last Botox procedure: 2/27/2024  Current migraine frequency: Typically more than 50% reduction in migraines. Has a new type of paresthesias in face and shoulders worse being upright will be having MRI imaging looking for CSF leak.  Current migraine duration: 1 day     Functional improvements since starting botulinum toxin treatments: In addition she notes that she is able to be more functional and interactive with her family.      Last Neurology office visit: 2/17/2024, 5/21/2024     A 200 unit vial of Botox was diluted with 4ml of 0.9% sodium chloride     Botox lot # and expiration date: See MAR for details  0.9% sodium chloride lot # and expiration date: See MAR for details     The following  muscles were injected using a 30 gauge needle:  Procerus 1 site 5 units   2 sites (bilat) 10 units  Frontalis 4 sites (bilat) 20 units ( slight eyebrow droop on right, Botox shift up and lateral)  Temporalis 8 sites (bilat) 40 units  **Trapezius muscles 4 sites (bilat) 10 units (lateral and reduced)  Occipitalis 8 sites (bilat) 40 units  **Splenius capitus 2 sites (bilat) 10 units (lateral and reduced)  Masseter 2 sites (bilat) 20 units     A total of 155 units were injected, 45 units wasted.   The patient tolerated the injections well. I was present for and performed the entire procedure.          Again, thank you for allowing me to participate in the care of your patient.      Sincerely,    Noemy Nguyen MD

## 2024-05-28 NOTE — PROGRESS NOTES
Botulinum Toxin Procedure Note     Ada Welch  [id number]     Ordering provider: Noemy Nguyen MD     The procedure was explained to the patient. Benefits of the treatment were discussed including headache and migraine reduction. Risks of the procedure were reviewed including but not limited to pain, bruising, bleeding, infection, weakness of muscles injected or those distal to injection. The patient voiced understanding of the risks and benefits. All questions answered and the patient consented to proceed.      Prior to receiving Botox injections the patient reported the following:  Baseline headache/migraine frequency: more than 15 days a month  Baseline headache/migraine duration: more than 4 hours  Associated migrainous features: wavy vision distortion, nausea, photophobia     Previous treatment trials:  amitriptyline, zonisamide, gabapentin, Aimovig, Emgality, duloxetine, tizanidine, propranolol     Last Botox procedure: 2/27/2024  Current migraine frequency: Typically more than 50% reduction in migraines. Has a new type of paresthesias in face and shoulders worse being upright will be having MRI imaging looking for CSF leak.  Current migraine duration: 1 day     Functional improvements since starting botulinum toxin treatments: In addition she notes that she is able to be more functional and interactive with her family.      Last Neurology office visit: 2/17/2024, 5/21/2024     A 200 unit vial of Botox was diluted with 4ml of 0.9% sodium chloride     Botox lot # and expiration date: See MAR for details  0.9% sodium chloride lot # and expiration date: See MAR for details     The following muscles were injected using a 30 gauge needle:  Procerus 1 site 5 units   2 sites (bilat) 10 units  Frontalis 4 sites (bilat) 20 units ( slight eyebrow droop on right, Botox shift up and lateral)  Temporalis 8 sites (bilat) 40 units  **Trapezius muscles 4 sites (bilat) 10 units (lateral and  reduced)  Occipitalis 8 sites (bilat) 40 units  **Splenius capitus 2 sites (bilat) 10 units (lateral and reduced)  Masseter 2 sites (bilat) 20 units     A total of 155 units were injected, 45 units wasted.   The patient tolerated the injections well. I was present for and performed the entire procedure.      Noemy Nguyen MD

## 2024-05-29 RX ORDER — CYCLOBENZAPRINE HCL 10 MG
10 TABLET ORAL 3 TIMES DAILY PRN
Qty: 60 TABLET | Refills: 0 | Status: SHIPPED | OUTPATIENT
Start: 2024-05-29 | End: 2024-06-18

## 2024-05-29 NOTE — TELEPHONE ENCOUNTER
Refill request    Medication: cyclobenzaprine (FLEXERIL) 10 MG tablet     Sig: cyclobenzaprine (FLEXERIL) 10 MG tablet     Dispensed: 60 tablets  Refills: 0    Last prescribed to patient: 4/9/24    Last clinic appointment: 3/14/24  Next clinic appointment: Not scheduled    Last Drug Screen Collected: 5/25/23  Controlled Substance Agreement signed: 5/25/23      Preferred pharmacy:    [address]     Refill request routed to the provider to review.        Skye Jernigan RN

## 2024-05-31 ENCOUNTER — TRANSFERRED RECORDS (OUTPATIENT)
Dept: HEALTH INFORMATION MANAGEMENT | Facility: CLINIC | Age: 54
End: 2024-05-31
Payer: COMMERCIAL

## 2024-06-04 DIAGNOSIS — M96.1 POSTLAMINECTOMY SYNDROME: ICD-10-CM

## 2024-06-05 RX ORDER — BUPRENORPHINE 10 UG/H
1 PATCH TRANSDERMAL WEEKLY
Qty: 4 PATCH | Refills: 1 | Status: SHIPPED | OUTPATIENT
Start: 2024-06-05 | End: 2024-08-12

## 2024-06-05 NOTE — TELEPHONE ENCOUNTER
Refill request      Medication: buprenorphine (BUTRANS) 10 MCG/HR WK patch     Sig: PLACE 1 PATCH ONTO THE SKIN ONCE A WEEK     Dispensed: 4  Refills: 1  SOLD to the pt on:  5/7/24 (Controlled Substance)      Last clinic appointment: 3/14/24  Next clinic appointment: Not scheduled. Sent pt a reminder to schedule.    Last Drug Screen Collected: 5/25/23  Controlled Substance Agreement signed: 5/25/23      Preferred pharmacy:    [address] [phone]     Refill request routed to the provider to review.     Mya Cash LPN

## 2024-06-13 DIAGNOSIS — M96.1 POSTLAMINECTOMY SYNDROME: ICD-10-CM

## 2024-06-13 RX ORDER — TRAMADOL HYDROCHLORIDE 50 MG/1
50 TABLET ORAL EVERY 6 HOURS PRN
Qty: 65 TABLET | Refills: 0 | Status: SHIPPED | OUTPATIENT
Start: 2024-06-13 | End: 2024-07-03

## 2024-06-13 NOTE — TELEPHONE ENCOUNTER
Chart reviewed - Patient of Dr. Martinez. Request appears appropriate.  checked, no concerns. Refilled for #65 tablets.     Jessica De Souza DNP, APRN, AGNP-C  [facility]

## 2024-06-13 NOTE — TELEPHONE ENCOUNTER
Refill request    Medication: traMADol (ULTRAM) 50 MG tablet     Sig: Take 1 tablet (50 mg) by mouth every 6 hours as needed for severe pain     Dispensed: 65  Refills: 3  SOLD to the pt on: 6/3/24 (Controlled Substance)  LPN called pharmacy to verify the date. Pt has no additional refills on profile.   Per the pharmacist- This is a 17 days supply.     Last clinic appointment: 3/14/24  Next clinic appointment: Not scheduled.     Last Drug Screen Collected: 5/25/23  Controlled Substance Agreement signed: 5/25/23      Preferred pharmacy:    [place], [place] - [address] [place] [address] [phone]     Refill request routed to the provider to review.     Mya Cash LPN

## 2024-06-17 DIAGNOSIS — M96.1 POSTLAMINECTOMY SYNDROME: ICD-10-CM

## 2024-06-17 NOTE — TELEPHONE ENCOUNTER
Refill request      Medication: CYCLOBENZAPRINE HCL 10MG TABS     Sig: TAKE ONE TABLET BY MOUTH THREE TIMES A DAY AS NEEDED FOR MUSCLE SPASMS     Dispensed: 60 tablets  Refills: 0    Last prescribed to patient: 5/29/24    Last clinic appointment: 3/14/24  Next clinic appointment: Not scheduled    Last Drug Screen Collected: 5/25/23  Controlled Substance Agreement signed: 5/25/23      Preferred pharmacy:    [place] [id number] [address] [place] [address]     Refill request routed to the provider to review.     Skye Jernigan RN     Subjective:      Jasmina Escobar is a 35 year old  female at 39w0d  gestation who presents with breech presentation scheduled for primary c/s.  Her current pregnancy is significant for breech presentation and routine prenatal care. Unsuccessful ECV 2 weeks ago.  Patient reports no complaints.       Past Medical History:   Diagnosis Date   • Chickenpox    • Vegan      Past Surgical History:   Procedure Laterality Date   • Mary Alice tooth extraction       SOCIAL HISTORY:   Social History     Tobacco Use   • Smoking status: Never Smoker   • Smokeless tobacco: Never Used   Substance Use Topics   • Alcohol use: Yes     Alcohol/week: 0.6 - 1.2 oz     Types: 1 - 2 Standard drinks or equivalent per week       Sexually Active: Yes             Partners with: Male      Drug Use:    No              Review of patient's social economics indicates:                    Comment: actress/whitaker    Family History   Problem Relation Age of Onset   • Diabetes Paternal Grandfather    • Allergic Rhinitis Mother    • Hypertension Father    • Stroke Paternal Uncle    • NEGATIVE FAMILY HX OF Other         colon, breast, ovarian ca        Objective:      Visit Vitals  /81 (BP Location: E, Patient Position: Semi-Taylor's)   Pulse 88   Temp 98.3 °F (36.8 °C) (Oral)   Resp 18   Ht 5' 2\" (1.575 m)   Wt 67.6 kg   LMP 2018   SpO2 100%   BMI 27.25 kg/m²       General:   alert, appears stated age and cooperative   Fetal Heart Tones:  145 beats per minute   Presentations: breech   Cervix:     Dilation: Closed    Effacement: 50%    Station:  -3    Consistency: medium    Position: posterior   Extremities: Nontender,       Exam inoffice this week.     Assessment:     > Intrauterine Pregnancy at 39w0d  >breech  > Group B Streptococcus negative]  > Reassuring Fetal Heart Tones     Plan:     > Admit   > Primary C/S scheduled.

## 2024-06-18 DIAGNOSIS — E78.2 MIXED HYPERLIPIDEMIA: ICD-10-CM

## 2024-06-18 RX ORDER — CYCLOBENZAPRINE HCL 10 MG
10 TABLET ORAL 3 TIMES DAILY PRN
Qty: 60 TABLET | Refills: 0 | Status: SHIPPED | OUTPATIENT
Start: 2024-06-18 | End: 2024-07-03

## 2024-06-18 NOTE — TELEPHONE ENCOUNTER
Chart reviewed - Patient of Dr. Martinez. Request appears appropriate. Refilled for 30 day supply.     Jessica De Souza, SANGITA, APRN, AGNP-C  [facility]

## 2024-06-20 ENCOUNTER — VIRTUAL VISIT (OUTPATIENT)
Dept: INTERNAL MEDICINE | Facility: CLINIC | Age: 54
End: 2024-06-20
Payer: COMMERCIAL

## 2024-06-20 DIAGNOSIS — Q79.60 EHLERS-DANLOS SYNDROME: ICD-10-CM

## 2024-06-20 DIAGNOSIS — M54.2 NECK PAIN: ICD-10-CM

## 2024-06-20 DIAGNOSIS — M47.812 CERVICAL FACET JOINT SYNDROME: ICD-10-CM

## 2024-06-20 DIAGNOSIS — R73.9 BLOOD SUGAR INCREASED: Primary | ICD-10-CM

## 2024-06-20 DIAGNOSIS — M96.1 FAILED BACK SURGICAL SYNDROME: ICD-10-CM

## 2024-06-20 PROCEDURE — G2211 COMPLEX E/M VISIT ADD ON: HCPCS | Performed by: INTERNAL MEDICINE

## 2024-06-20 PROCEDURE — 99214 OFFICE O/P EST MOD 30 MIN: CPT | Mod: 95 | Performed by: INTERNAL MEDICINE

## 2024-06-20 RX ORDER — HYDROMORPHONE HYDROCHLORIDE 2 MG/1
2 TABLET ORAL 2 TIMES DAILY PRN
Qty: 20 TABLET | Refills: 0 | Status: SHIPPED | OUTPATIENT
Start: 2024-06-20 | End: 2024-07-23

## 2024-06-20 NOTE — PROGRESS NOTES
"Ada is a 53 year old who is being evaluated via a billable video visit.    How would you like to obtain your AVS? MyChart  If the video visit is dropped, the invitation should be resent by: Text to cell phone: [phone]  Will anyone else be joining your video visit? No      Are refills needed on medications prescribed by this physician? NO    Assessment & Plan     Failed back surgical syndrome  - HYDROmorphone (DILAUDID) 2 MG tablet; Take 1 tablet (2 mg) by mouth 2 times daily as needed for pain    Blood sugar increased  Has been struggling with BG and weight after stopping GLP1a. Reports concerns about BG.   - Adult Endocrinology  Referral; Future    Neck pain  - Adult Neurosurgery  Referral; Future    Cervical facet joint syndrome  Tom-Danlos syndrome  Struggling with head/neck pain recently, with reports of numbness in arms. Has been working with Neurologist, considering blood patch. However, with reports of numbness in extremities and positional nature, I worry about her spine and her EDS. Difficult to evaluate over video/phone today. Encouraged ER for worsening symptoms. May be beneficial to see NSGY, consider standing imaging, EMGs.  I offered short term opioid refill for pain flare.  - Adult Neurosurgery  Referral; Future          BMI  Estimated body mass index is 29.44 kg/m  as calculated from the following:    Height as of 5/21/24: 1.702 m (5' 7\").    Weight as of 5/21/24: 85.3 kg (188 lb).             No follow-ups on file.    Subjective   Ada is a 53 year old, presenting for the following health issues:  RECHECK    HPI      Tiff is here today to follow-up on weight, pain  She is in a lot of pain today, the visit is limited due to a poor connection  She reports numbness, radiates from head to chest and arms  Gets worse with prolonged standing  Back head  Follows with Pain Clinic, Neurology, Has appt for neurosurgery next week      2 new grandkids, closed on new " house in [facility], in good place with [place], living with Radha          Detailed Medical History:  -EDS-Type 3 Hypermobility. 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 He reports having a Beighton score of 7-8 out of 9. She reports having genetic testing done, which was questionable for the MYLK Gene. She previously followed with a rheumatologist who is an EDS specialist in [place].  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 Russell, has ILR. Ivadrabine and propronolol helping.  -Cervical instability, chiari malformation:  Saw Dr. Moncada, cervical fusion not recommended, considering occipital blocks. Also traveled to [facility] to see Dr. Andrey Sofia.  -Chronic headaches: Daily tension, cervicogenic, migraine, intracranial hypotension. Has not responded well to injections. Hospitalized in 2017 with normal LP and MRI. At that time responded to DHA.  Had blood patch done 3/19 with good relief of pain. Previously failed amovig and emgality. Blood patch test repeated 9/19. Now managing with medical management, Uses fioricet,maxalt, aleve, tizanidine. Botox.  -Hiatal hernia: Recently saw Dr. Mack and had manometry testing done in Feb 2019 which revealed lower esoph pressure, hiatal hernia, possibly short esophagus, peristalsis was preserved. S/p Nissen surgery 2/20.  -Eosinophilic esophagitis: EGD 12/18 performed for food impaction showed ring at GEJ and mucosal changes c/w EoE. Biopsies 8/18 positive for Eos.  -Lumbar pain: She underwent L4 to S1 fusion in 2013 followed by removal of instrumentation, which did not improve her symptoms. She met with Dr. Martinez in the pain clinic and was taken off high doses of Dilaudid and started on Suboxone in 2017, which dramatically improved her symptoms.  She continues to work with physical therapy twice a week as well as other complementary therapies.   -Cervical pain, no surgical targets per neurosurgery 2023  -IBS, constipation: GES showed rapid emptying 10/17. XR  video swallow showed no aspiration 10/17.  -Asthma  -Enlarged cervical lymph nodes: L level 2 node biopsied 10/18 with scant cellularity. Biopsied in past and benign with neg flow cytometry.  -Urinary urge incontinence/incomplete emptying  -Pelvic floor dysfunction, rectocele, Pel[facility] Floor [facility]  -Chronic Pain: Has regimen of medical cannabis, tramadol, butran, cymbalta and tizanidine. Uses medical cannabis since 2018 which helps. Follows with Dr. Martinez.        Routine Health Maintenence:  Depression Screening:   PHQ-2 Score:           2/23/2023     8:24 AM 7/14/2022     9:12 AM   PHQ-2 ( 1999 Pfizer)   Q1: Little interest or pleasure in doing things 0 0   Q2: Feeling down, depressed or hopeless 0 0   PHQ-2 Score 0 0         Immunizations (zoster, pneumovax, flu, Tdap, Hep A/B):   There is no immunization history on file for this patient.  Lipids:         Recent Labs   Lab Test 04/26/18  1010   CHOL 244*   HDL 48*   *   TRIG 156*         Lung Ca Screening (>30 pk age 55-79 or >20 py age 50-79 + RF): n/a low pack years  Colonoscopy (50-75 yrs): 3/17 , due 2027   - One small hyperplastic polyp in the sigmoid colon, removed with a cold snare. Resected and retrieved. Diverticulosis in the sigmoid colon. Repeat colonoscopy in10 years for surveillance   Dexa (>65W or 70M yrs): n/a  Mammogram (40-75 yrs): 1/18 ordered, 6/22, ordered  Pap (21-65 yrs): 1/18 HPV neg, due 1/23  HIV/HCV if risk factors:  HIV neg 4/18  Tob/EtOH: screen neg  Lifestyle factors: reviewed  Advanced Directive: deferred                      Objective           Vitals:  No vitals were obtained today due to virtual visit.    Physical Exam   GENERAL: alert and no distress  EYES: Eyes grossly normal to inspection.  No discharge or erythema, or obvious scleral/conjunctival abnormalities.  RESP: No audible wheeze, cough, or visible cyanosis.    SKIN: Visible skin clear. No significant rash, abnormal pigmentation or lesions.  NEURO: Cranial nerves  grossly intact.  Mentation and speech appropriate for age.  PSYCH: Appropriate affect, tone, and pace of words          Video-Visit Details    Type of service:  Video Visit  -- Phone  Originating Location (pt. Location): Home    Distant Location (provider location):  On-site  Platform used for Video Visit: Sobeida  Signed Electronically by: Ellyn Rivera MD      The longitudinal plan of care for the diagnosis(es)/condition(s) as documented were addressed during this visit. Due to the added complexity in care, I will continue to support Ada in the subsequent management and with ongoing continuity of care.

## 2024-06-20 NOTE — PATIENT INSTRUCTIONS
Thank you for visiting the Primary Care Center today at the [facility]! The following is some information about our clinic:     Primary Care Center Frequently-Asked Questions    (1) How do I schedule appointments at the [facility]?     Primary Care--to schedule or make changes to an existing appointment, please call our primary care line at [phone].    Labs--to schedule a lab appointment at the [facility] you can use [email or web address] or call [phone]. If you have a [place] location that is closer to home, you can reach out to that location for scheduling options.     Imaging--if you need to schedule a CT, X-ray, MRI, ultrasound, or other imaging study you can call [phone] to schedule at the [facility] or any other [facility] imaging location.     Referrals--if a referral to another specialty was ordered you can expect a phone call from their scheduling team. If you have not heard from them in a week, please call us or send us a [email or web address] message to check the status or get a scheduling number. Please note that this only applies to internal [facility] referrals. If the referral is external you would need to contact their office for scheduling.     (2) I have a question about my visit, who do I contact?     You can call us at the primary care line at [phone] to ask questions about your visit. You can also send a secure message through [email or web address], which is reviewed by clinic staff. Please note that REES46 messages have a twenty-four to forty-eight business hour turnaround time and should not be used for urgent concerns.    (3) How will I get the results of my tests?    If you are signed up for [employer] all tests will be released to you within twenty-four hours of resulting. Please allow three to five days for your doctor to review your results and place a note interpreting the results. If you do not have [facility] you will receive your  results through mail seven to ten business days following the return of the tests. Please note that if there should be any urgent or concerning results that your doctor or their registered nurse will reach out to you the same day as the tests come back. If you have follow up questions about your results or would like to discuss the results in detail please schedule a follow up with your provider either in person or virtually.     (4) How do I get refills of my prescriptions?     You should always first contact your pharmacy for refills of your medications. If submitting a refill request on GRID, please be sure to submit the request only once--repeat requests can cause delays in refill. If you are requesting a NEW medication or a medication related to new symptoms you will need to schedule an appointment with a provider prior to approval. Please note: Routine medication refills have up to one to three business day turnaround whereas controlled substances refills have up to five to seven business day turnaround.    (5) I have new symptoms, what do I do?     If you are having an immediate medical emergency, you should dial 911 for assistance.   For anything urgent that needs to be seen within a few hours to one day you should visit a local urgent care for assistance.  For non-urgent symptoms that need to be seen within a few days to a week you can schedule with an available provider in primary care by going to [employer] or calling [phone].   If you are not sure how serious your symptoms are or you would like to receive medical advice you can always call [phone] to speak with a triage nurse.

## 2024-06-21 DIAGNOSIS — G97.1 POST-DURAL PUNCTURE HEADACHE: Primary | ICD-10-CM

## 2024-06-22 ENCOUNTER — TELEPHONE (OUTPATIENT)
Dept: NEUROLOGY | Facility: CLINIC | Age: 54
End: 2024-06-22
Payer: COMMERCIAL

## 2024-06-22 NOTE — TELEPHONE ENCOUNTER
Called by outside ED about this patient.  Has history of difficult to trat headaches.  Presenting for headache with numbness in face and arms.  Of note this was reason for MRI's ordered in May so do not feel this is new, but certainly could be worse. .  There has been some concern for CSF leak due to worsening.  They obtained imaging including MRI brain and MRA which I am told is negative but I cannot review images.  MRI cervical done last month without any clear leak site.   Per ED while it is orthostatic she is in severe pain even while laying flat.        They have given opioids and tramadol thus far.  Recommend trial a cocktail of reglan and toradol with benadryl.  If no contraindications can do a burst of steroids such as prednisone 60mg for 5 days.   Per ED their neuro exam is entirely normal.  They are not sure if they can pursue blood patch at that institution.   Advised they try headache medicines noted above.  At this time do not feel needs transfer for specialty neuro evaluation as has plan and recent visits with neurology with outpatient plan in place it appears.  If they can do blood patch locally would certainly be reasonable to try and if does not respond to medications when going home such as steroids recommend she consider being seen at [facility] and evaluated by neurology where can evaluate for blind blood patch or possible spine imaging with targeted blood patch pending evaluation.        Luz Marina Bah, DO  Neurology

## 2024-06-23 ENCOUNTER — HOSPITAL ENCOUNTER (EMERGENCY)
Facility: CLINIC | Age: 54
Discharge: HOME | End: 2024-06-23
Attending: EMERGENCY MEDICINE | Admitting: EMERGENCY MEDICINE
Payer: COMMERCIAL

## 2024-06-23 VITALS
TEMPERATURE: 97.4 F | OXYGEN SATURATION: 100 % | DIASTOLIC BLOOD PRESSURE: 83 MMHG | HEART RATE: 72 BPM | SYSTOLIC BLOOD PRESSURE: 116 MMHG | RESPIRATION RATE: 16 BRPM

## 2024-06-23 DIAGNOSIS — R51.9 HEADACHE, UNSPECIFIED HEADACHE TYPE: ICD-10-CM

## 2024-06-23 LAB
ALBUMIN SERPL BCG-MCNC: 4.1 G/DL (ref 3.5–5.2)
ALP SERPL-CCNC: 102 U/L (ref 40–150)
ALT SERPL W P-5'-P-CCNC: 56 U/L (ref 0–50)
ANION GAP SERPL CALCULATED.3IONS-SCNC: 8 MMOL/L (ref 7–15)
AST SERPL W P-5'-P-CCNC: 57 U/L (ref 0–45)
BASOPHILS # BLD AUTO: 0.1 10E3/UL (ref 0–0.2)
BASOPHILS NFR BLD AUTO: 1 %
BILIRUB SERPL-MCNC: 0.7 MG/DL
BUN SERPL-MCNC: 14.3 MG/DL (ref 6–20)
CALCIUM SERPL-MCNC: 8.6 MG/DL (ref 8.6–10)
CHLORIDE SERPL-SCNC: 106 MMOL/L (ref 98–107)
CREAT SERPL-MCNC: 0.93 MG/DL (ref 0.51–0.95)
CRP SERPL-MCNC: <3 MG/L
DEPRECATED HCO3 PLAS-SCNC: 23 MMOL/L (ref 22–29)
EGFRCR SERPLBLD CKD-EPI 2021: 74 ML/MIN/1.73M2
EOSINOPHIL # BLD AUTO: 0.2 10E3/UL (ref 0–0.7)
EOSINOPHIL NFR BLD AUTO: 2 %
ERYTHROCYTE [DISTWIDTH] IN BLOOD BY AUTOMATED COUNT: 12.9 % (ref 10–15)
GLUCOSE SERPL-MCNC: 88 MG/DL (ref 70–99)
HCT VFR BLD AUTO: 38 % (ref 35–47)
HGB BLD-MCNC: 12.7 G/DL (ref 11.7–15.7)
IMM GRANULOCYTES # BLD: 0 10E3/UL
IMM GRANULOCYTES NFR BLD: 0 %
LYMPHOCYTES # BLD AUTO: 2.7 10E3/UL (ref 0.8–5.3)
LYMPHOCYTES NFR BLD AUTO: 37 %
MAGNESIUM SERPL-MCNC: 2.4 MG/DL (ref 1.7–2.3)
MCH RBC QN AUTO: 31.1 PG (ref 26.5–33)
MCHC RBC AUTO-ENTMCNC: 33.4 G/DL (ref 31.5–36.5)
MCV RBC AUTO: 93 FL (ref 78–100)
MONOCYTES # BLD AUTO: 0.5 10E3/UL (ref 0–1.3)
MONOCYTES NFR BLD AUTO: 8 %
NEUTROPHILS # BLD AUTO: 3.7 10E3/UL (ref 1.6–8.3)
NEUTROPHILS NFR BLD AUTO: 52 %
NRBC # BLD AUTO: 0 10E3/UL
NRBC BLD AUTO-RTO: 0 /100
PLATELET # BLD AUTO: 218 10E3/UL (ref 150–450)
POTASSIUM SERPL-SCNC: 4 MMOL/L (ref 3.4–5.3)
PROT SERPL-MCNC: 6.1 G/DL (ref 6.4–8.3)
RBC # BLD AUTO: 4.08 10E6/UL (ref 3.8–5.2)
SODIUM SERPL-SCNC: 137 MMOL/L (ref 135–145)
WBC # BLD AUTO: 7.1 10E3/UL (ref 4–11)

## 2024-06-23 PROCEDURE — 96361 HYDRATE IV INFUSION ADD-ON: CPT | Performed by: EMERGENCY MEDICINE

## 2024-06-23 PROCEDURE — 96375 TX/PRO/DX INJ NEW DRUG ADDON: CPT | Performed by: EMERGENCY MEDICINE

## 2024-06-23 PROCEDURE — 250N000013 HC RX MED GY IP 250 OP 250 PS 637: Performed by: EMERGENCY MEDICINE

## 2024-06-23 PROCEDURE — 250N000011 HC RX IP 250 OP 636: Mod: JZ | Performed by: EMERGENCY MEDICINE

## 2024-06-23 PROCEDURE — 96374 THER/PROPH/DIAG INJ IV PUSH: CPT | Performed by: EMERGENCY MEDICINE

## 2024-06-23 PROCEDURE — 99284 EMERGENCY DEPT VISIT MOD MDM: CPT | Mod: 25 | Performed by: EMERGENCY MEDICINE

## 2024-06-23 PROCEDURE — 86140 C-REACTIVE PROTEIN: CPT | Performed by: EMERGENCY MEDICINE

## 2024-06-23 PROCEDURE — 80053 COMPREHEN METABOLIC PANEL: CPT | Performed by: EMERGENCY MEDICINE

## 2024-06-23 PROCEDURE — 999N000128 HC STATISTIC PERIPHERAL IV START W/O US GUIDANCE

## 2024-06-23 PROCEDURE — 99207 PR SERVICE NOT STAFFED W/SUPERV PROV: CPT | Performed by: PSYCHIATRY & NEUROLOGY

## 2024-06-23 PROCEDURE — 36415 COLL VENOUS BLD VENIPUNCTURE: CPT | Performed by: EMERGENCY MEDICINE

## 2024-06-23 PROCEDURE — 85025 COMPLETE CBC W/AUTO DIFF WBC: CPT | Performed by: EMERGENCY MEDICINE

## 2024-06-23 PROCEDURE — 99285 EMERGENCY DEPT VISIT HI MDM: CPT | Performed by: EMERGENCY MEDICINE

## 2024-06-23 PROCEDURE — 258N000003 HC RX IP 258 OP 636: Mod: JZ | Performed by: EMERGENCY MEDICINE

## 2024-06-23 PROCEDURE — 83735 ASSAY OF MAGNESIUM: CPT | Performed by: EMERGENCY MEDICINE

## 2024-06-23 RX ORDER — METOCLOPRAMIDE HYDROCHLORIDE 5 MG/ML
5 INJECTION INTRAMUSCULAR; INTRAVENOUS ONCE
Status: COMPLETED | OUTPATIENT
Start: 2024-06-23 | End: 2024-06-23

## 2024-06-23 RX ORDER — HYDROMORPHONE HYDROCHLORIDE 1 MG/ML
0.5 INJECTION, SOLUTION INTRAMUSCULAR; INTRAVENOUS; SUBCUTANEOUS
Status: COMPLETED | OUTPATIENT
Start: 2024-06-23 | End: 2024-06-23

## 2024-06-23 RX ORDER — DIPHENHYDRAMINE HCL 25 MG
25 CAPSULE ORAL ONCE
Status: COMPLETED | OUTPATIENT
Start: 2024-06-23 | End: 2024-06-23

## 2024-06-23 RX ORDER — PREDNISONE 20 MG/1
60 TABLET ORAL DAILY
Qty: 15 TABLET | Refills: 0 | Status: SHIPPED | OUTPATIENT
Start: 2024-06-23 | End: 2024-06-28

## 2024-06-23 RX ORDER — KETOROLAC TROMETHAMINE 15 MG/ML
10 INJECTION, SOLUTION INTRAMUSCULAR; INTRAVENOUS ONCE
Status: COMPLETED | OUTPATIENT
Start: 2024-06-23 | End: 2024-06-23

## 2024-06-23 RX ADMIN — KETOROLAC TROMETHAMINE 10 MG: 15 INJECTION INTRAMUSCULAR; INTRAVENOUS at 20:08

## 2024-06-23 RX ADMIN — HYDROMORPHONE HYDROCHLORIDE 0.5 MG: 1 INJECTION, SOLUTION INTRAMUSCULAR; INTRAVENOUS; SUBCUTANEOUS at 21:43

## 2024-06-23 RX ADMIN — METOCLOPRAMIDE 5 MG: 5 INJECTION, SOLUTION INTRAMUSCULAR; INTRAVENOUS at 20:08

## 2024-06-23 RX ADMIN — SODIUM CHLORIDE 1000 ML: 9 INJECTION, SOLUTION INTRAVENOUS at 20:08

## 2024-06-23 RX ADMIN — DIPHENHYDRAMINE HYDROCHLORIDE 25 MG: 25 CAPSULE ORAL at 20:08

## 2024-06-23 ASSESSMENT — COLUMBIA-SUICIDE SEVERITY RATING SCALE - C-SSRS
1. IN THE PAST MONTH, HAVE YOU WISHED YOU WERE DEAD OR WISHED YOU COULD GO TO SLEEP AND NOT WAKE UP?: NO
6. HAVE YOU EVER DONE ANYTHING, STARTED TO DO ANYTHING, OR PREPARED TO DO ANYTHING TO END YOUR LIFE?: NO
2. HAVE YOU ACTUALLY HAD ANY THOUGHTS OF KILLING YOURSELF IN THE PAST MONTH?: NO

## 2024-06-23 ASSESSMENT — ACTIVITIES OF DAILY LIVING (ADL)
ADLS_ACUITY_SCORE: 37
ADLS_ACUITY_SCORE: 35

## 2024-06-23 NOTE — ED TRIAGE NOTES
Pt arrives ambulatory to triage c/o ongoing chronic head and numbness. Was seen at OSH on 6/21 for a stroke alert but was sent home. Sees neurology at [facility]. No new complaints today, just not getting better. Has had several scans and tests done without answers.

## 2024-06-23 NOTE — ED PROVIDER NOTES
ED Provider Note  [facility]      History     Chief Complaint   Patient presents with    Headache    Numbness     HPI  Ada Welch is a 53 year old female with a history of Tom-Danlos syndrome type III hypermobility, HTN, hepatomegaly, acquired hypothyroidism, cervical facet joint syndrome and chiari malformation, POTS/dysutonomia, chronic headache, cervicogenic, migraine, and intracranial hypotension, chronic lumbar pain, chronic pain who presents with a headache and numbness.  She reports that for the last 10 weeks she has had persistent headaches that initially were only present with standing.  When she was standing she would develop bilateral numbness that started at her cheeks and worked its way down to her entire body.  When she would lay down her symptoms would improve.  Over the last few weeks however the symptoms seem to last longer longer even when lying flat.  She reports she follows with Dr. Nguyen and there was some concern for possible CSF leak.  She denies any recent falls or traumatic injury.  She has not had any fevers.  She reports her bilateral hands occasionally feel weak and clumsy, no other focal weakness noted.  She denies any rashes.  Denies any recent URI symptoms, vomiting or diarrhea.  She was seen in an outside ED 2 days ago and presents now due to ongoing symptoms.    Per chart review from [facility] ED on 6/21/21:  Ada Welch is a very pleasant 53 year-old woman with a past medical history including Tom-Danlos syndrome type III hypermobility, HTN, hepatomegaly, acquired hypothyroidism, cervical facet joint syndrome and chiari malformation (cervical fusion has not been recommended), POTS/dysutonomia (on ivadrabine and propranolol), chronic headache follwed by Dr. Nguyen (felt to be tension, cervicogenic, migraine, and intracranial hypotension -- blood patch March 2019 good relief and repeated in 9/19 -- using fioricet,maxalt, aleve,  tizanidine, botox), chronic lumbar pain on Suboxone, chronic pain followed by Dr. Martinez (managed with medical cannabis, tramadol, butran, cymbalta and tizanidine) who presents to the ED for evaluation of severe headache, paresthesias of her face bilaterally, paresthesias and subjective weakness of bilateral upper extremities, forgetfulness, feeling off balance. The headache is worse when standing or sitting, particularly for long periods of time. However, even when supine she is reporting 10/10 headache.     She presented as a stroke team activation and thus was brought to [facility]. She had requested to go to [facility]. When she arrived in the ED, I cancelled the stroke activation due to symptom onset >24 hours ago.     She states that for the past several months she has had worsening of all of the symptoms listed above. She was hopeful to go to the [facility] today to perhaps pursue a blood patch.     MR of brain on 6/21/24:  Impression:  1. No evidence of acute intracranial abnormality.   2. Few foci of T2 prolongation in the supratentorial white matter nonspecific.  Differential considerations include sequela of migraine headaches, prior  inflammation and chronic small vessel disease.   3. Mild mucosal thickening in the maxillary sinuses with air-fluid level could  represent acute sinusitis. Mild mucosal thickening in the right frontal sinus  and anterior ethmoid air cells.     MRA of head Venogram on 6/21/24:   Impression:  Unremarkable MRV of the head as far as visualized.     MR angio of head on 6/21/24:  Impression:  No evidence of proximal arterial occlusion, aneurysm, dissection, or vascular  malformation.         Physical Exam   BP: 116/83  Pulse: 72  Temp: 97.4  F (36.3  C)  Resp: 16  SpO2: 100 %  Physical Exam  General: patient is alert and oriented, appears fatigued   head: atraumatic and normocephalic   EENT: moist mucus membranes without tonsillar erythema or exudates, pupils 3 mm, equal round and reactive,  extraocular movements intact, no nystagmus, sclera anicteric  Neck: supple no meningismus  Cardiovascular: regular rate and rhythm, no murmur appreciated, extremities warm and well perfused, no lower extremity edema  Pulmonary: lungs clear to auscultation bilaterally   Abdomen: soft, non-tender   Musculoskeletal: normal range of motion   Neurological: alert and oriented, moving all extremities symmetrically, CN II-XII intact, strength 5/5 and symmetric in , elbow flexion/extension, hip flexion/extension, knee flexion/extension and ankle plantar/dorsiflexion, sensation to light touch in distal upper and lower extremities intact, normal gait  Skin: warm, dry      ED Course, Procedures, & Data      Procedures            No results found for any visits on 06/23/24.  Medications - No data to display  Labs Ordered and Resulted from Time of ED Arrival to Time of ED Departure - No data to display  No orders to display          Critical care was not performed.     Medical Decision Making  The patient's presentation was of high complexity (a chronic illness severe exacerbation, progression, or side effect of treatment).    The patient's evaluation involved:  ordering and/or review of 3+ test(s) in this encounter (see separate area of note for details)  discussion of management or test interpretation with another health professional (neurology)    The patient's management necessitated moderate risk (prescription drug management including medications given in the ED) and high risk (a decision regarding hospitalization).    Assessment & Plan    Ms. Welch is a 53 year old female with a history of Tom-Danlos syndrome type III hypermobility, HTN, hepatomegaly, acquired hypothyroidism, cervical facet joint syndrome and chiari malformation, POTS/dysutonomia, chronic headache, cervicogenic, migraine, and intracranial hypotension, chronic lumbar pain, chronic pain who presents with a headache and numbness.  She is  hemodynamically stable, afebrile and in no respiratory distress.  On exam I do not appreciate any focal neurologic deficits.  She has had significant neurologic workup already with MRI imaging.  Will plan to treat with IV fluids, Toradol, Reglan and Benadryl and consult neurology for recommendations on additional workup and management given unclear etiology.      Labs demonstrate no leukocytosis, no elevation in CRP, ALT is slightly elevated at 56, AST 57, remainder of electrolytes are unremarkable with exception of a slightly elevated magnesium of 2.4.  She was seen and evaluated by neurology and discussed admission for pain control and blood patch versus outpatient management.  She would prefer to follow-up in clinic and order for blood patch was placed.  She will call her neurology clinic to schedule this.  She also has scheduled follow-up with neurosurgery in 2 days.  Neurology has recommended 60 mg of prednisone x 5 days as well.  She was given a prescription for this.  Discussed close return precautions and patient voiced understanding.    I have reviewed the nursing notes. I have reviewed the findings, diagnosis, plan and need for follow up with the patient.    New Prescriptions    No medications on file       Final diagnoses:   Headache, unspecified headache type       Amy Nolen MD  [facility] EMERGENCY DEPARTMENT  6/23/2024     Amy Nolen MD  06/23/24 1479

## 2024-06-24 ENCOUNTER — HOSPITAL ENCOUNTER (OUTPATIENT)
Facility: CLINIC | Age: 54
End: 2024-06-24
Payer: COMMERCIAL

## 2024-06-24 ENCOUNTER — MYC MEDICAL ADVICE (OUTPATIENT)
Dept: INTERNAL MEDICINE | Facility: CLINIC | Age: 54
End: 2024-06-24
Payer: COMMERCIAL

## 2024-06-24 ENCOUNTER — TELEPHONE (OUTPATIENT)
Dept: ANESTHESIOLOGY | Facility: CLINIC | Age: 54
End: 2024-06-24
Payer: COMMERCIAL

## 2024-06-24 NOTE — CONSULTS
[facility]  Neurology Consultation    Patient Name:  Ada Welch  MRN:  [id number]    :  1970  Date of Service:  2024  Primary care provider:  Ellyn Rivera      Neurology consultation service was asked to see Ada Welch by Dr. Russ to evaluate for headaches.    Chief Complaint:  Headaches, neck pain, numbness, postural worsening    History of Present Illness:   Ada Welch is a 53 year old female with history of Tom-Danlos syndrome type III hypermobility, HTN, hepatomegaly, acquired hypothyroidism, cervical facet joint syndrome and chiari malformation (cervical fusion has not been recommended), POTS/dysautonomia, chronic headache follwed by Dr. Nguyen (felt to be tension, cervicogenic, migraine, and intracranial hypotension), blood patch 2019 good relief and repeated in  -- using fioricet,maxalt, aleve, tizanidine, botox), chronic lumbar pain on Suboxone, chronic pain followed by Dr. Martinez (managed with medical cannabis, tramadol, butran, cymbalta and tizanidine) who presented to ED with worsening headaches, neck pain, numbness that gets worse with standing up for the past 10 weeks.    She has had worsening headache on standing up for many years and has had blood patch twice in the past.  Feels that her first blood patch really helped and the second 1 did not as much.  Her typical migraine involves posterior midline location, that radiates on both sides to the front, is severe 7-8 out of 10, 5 out of 10 when mild, can be with or without photophobia and phonophobia, may have nausea and vomiting when the headache is severe, last for several days.  She has been having 30 out of 30 headache days where some of the headache days may be severe but she cannot completely recall right now how many.  Recently she has also noticed that when her headaches are worse he may find it hard to think of words, gets forgetful.  She headaches get worse  on standing up, along with sensation of dizziness, numbness, neck pain.  Previously, she would feel dizzy and numb after 1 to 2 hours after standing up.  Symptoms got worse for the past 10 weeks she feels that her dizziness and numbness starts in a few minutes up to half an hour.  He describes a numbness involving horizontal line below upper lips and cheeks involving her lower face, upper chest and neck, mid symptoms involve her arms, legs has not noted numbness in her abdomen.  Her numbness comes in waves, comes and goes, may be mild to severe, headache make it better but her numbness does not get better with very still.  She wonders if her numbness and related to cardiac malformation.  She also wonders if her numbness may be related to severe cervical spine stenosis.  She wonders if she would get spinal surgery involving fusion of all of her spine for her cervical and lumbar symptoms.    The patient states that she has had migraine headaches since the age of 30 years. She describes having different types, one type is only visual auras and no pain, she sees wavy lines, flashing lights, can see floaters or may have blurry vision in her eyes.  She also describes having a cluster of migraines in the past that required DHE treatment about 14 years ago.      She was seen in the ED on 6/21/2024 at [place] where she had an MRI brain without contrast that was without any acute changes, also had MRV that was without dural venous sinus thrombosis, also had MRA that was without any vascular abnormality that explain her symptoms.  She received Valium, Dilaudid, normal saline her symptoms improved and was sent home.  She states that she has been in conversation with her outpatient headache provider Dr. Nguyen, it appears that a blood patch has been ordered as outpatient.  After her last visit with Dr. Nguyen MRI C and T -spine with and without contrast was done on 5/31/2024 that revealed multilevel degenerative cervical and  upper thoracic spondylosis, central cord T2 hyperintensity likely prominent central cord canal versus underlying mild syringohydromyelia, mild to moderate C4-C5 synovitis, chronic moderately severe right C5-C6 4 middle stenosis, MRI T-spine with mild right T4-T5 for minimal narrowing, edematous degenerative endplate changes at C6-C7.    She does not recall all the medications she has taken in the past 10 weeks, she does recall taking Tylenol 30 out of 30 days, for abortives she takes Tylenol with tramadol, sometimes with ibuprofen/naproxen that helps with the headache but does not make it go away.  She states that she takes Tylenol 30 out of 30 days, takes ibuprofen/naproxen less frequently but does not remember the number of days.  For preventative she takes propranolol, is on Botox last dose on 5/28/2024, has tried Nurtec in the past that did not help, does not recall other preventatives at this time.  She states she is trying all the options including PT, physical therapy, is trying to have acupuncture set up, is trying cannabis balm/edibles that do not help, laying down may make his symptoms better.    Per last visit with Dr. Nguyen from 5/21/2024  'Ada is seen for an intractable headache that started 5 weeks ago.   She stopped Wegovy due to lack of insurance. She wonders if that triggers the headache. No other changes, no truama, no falls  The good news is she is a new grandma.      New headache started 5 weeks ago, has not responded to Nurtec, rizatriptan, ibuprofen, Tylenol, Imitrex stat dose, butalbitol, headache hat, cannabis balm/edibles. She has been going to PT  Pain going from back of head up  Vision is impaired-Weird peripheral vision changes,  more floaters, shadows in periphery  Light sensitive  Nausea  Cough or sneezing makes pain worse  Worse with being upright and the pain builds  If she lays flat it takes awhile to feel better but it does help. Can take 3-4 hours.      Previous treatment  trials:  amitriptyline, zonisamide, gabapentin, Aimovig, Emgality, duloxetine, tizanidine, propranolol      She is on topiramate'      ROS  A comprehensive ROS was performed and pertinent findings were included in HPI.     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    Cervicalgia     Chronic sinusitis 00/2007    Diagnosed with chronic pansinusitis 2016    Coagulation disorder (H24)     factor 8    CSF leak     Chiari malformation    Depression     Tom-Danlos disease     Eosinophilic esophagitis 08/2018    Gastroesophageal reflux disease 3/1/2017    I have large hiatal hernia    Hiatal hernia 2016    Have adeline hiatel hernia    Hoarseness Unsure    It comes and goes    Hypertension     Hypothyroid     IBS (irritable bowel syndrome) with constipation     improved on Linzess    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    Irregular heart beat     Migraines 1/1/2007    Unsure of exact date    Nasal polyps 2013    Were revoved 03/2017, benign    Orthostatic hypotension     Other nervous system complications 1/2014    Autonomic nervous system disorder, neuralgia, neuropathy    Swelling, mass, or lump in head and neck 08/2016    Lymph node has been growing for last year    Thyroid disease 01/01/2008    Urinary incontinence     Urinary urgency      Past Surgical History:   Procedure Laterality Date    AS REPAIR OF NASAL SEPTUM  2009    repair broke nose    BACK SURGERY  12/09/2013  10/01/2014    Spinal fusion L4-S1, L5 moving 5/8ths in. Remove screws/rods, Dr. Vega    BIOPSY  01/01/2008 & 3/2017    mole biopsy, lymph node biopsy    COLONOSCOPY  03/2017    Colonoscopy and GI    ESOPHAGEAL  IMPEDENCE FUNCTION TEST WITH 24 HOUR PH GREATER THAN 1 HOUR N/A 09/17/2019    Procedure: IMPEDANCE PH STUDY, ESOPHAGUS, 24 HOUR;  Surgeon: Raghavendra Grande MD;  Location:  GI    ESOPHAGOSCOPY, GASTROSCOPY, DUODENOSCOPY (EGD), COMBINED N/A 08/03/2018    Procedure: COMBINED ESOPHAGOSCOPY, GASTROSCOPY, DUODENOSCOPY (EGD), BIOPSY SINGLE OR MULTIPLE;;  Surgeon: Juan Woodson MD;  Location:  GI    ESOPHAGOSCOPY, GASTROSCOPY, DUODENOSCOPY (EGD), COMBINED N/A 10/22/2018    Procedure: COMBINED ESOPHAGOSCOPY, GASTROSCOPY, DUODENOSCOPY (EGD), BIOPSY SINGLE OR MULTIPLE;  Surgeon: Sadia Carolina MD;  Location:  GI    ESOPHAGOSCOPY, GASTROSCOPY, DUODENOSCOPY (EGD), COMBINED N/A 12/17/2018    Procedure: COMBINED ESOPHAGOSCOPY, GASTROSCOPY, DUODENOSCOPY (EGD);  Surgeon: Juan Woodson MD;  Location:  GI    ESOPHAGOSCOPY, GASTROSCOPY, DUODENOSCOPY (EGD), COMBINED N/A 10/14/2022    Procedure: ESOPHAGOGASTRODUODENOSCOPY, WITH BIOPSY;  Surgeon: Jay Shaffer MD;  Location: [facility] OR    INJECT BLOCK MEDIAL BRANCH CERVICAL/THORACIC/LUMBAR Left 09/24/2020    Procedure: BLOCK, NERVE, FACET JOINT, MEDIAL BRANCH, DIAGNOSTIC;  Surgeon: Faiza Martinez MD;  [facility]    INJECT BLOCK MEDIAL BRANCH CERVICAL/THORACIC/LUMBAR Bilateral 10/08/2020    Procedure: Bilateral cervical 3, cervical 4, and cervical 5 medial branch nerve block;  Surgeon: Faiza Martinez MD;  Location: [facility] OR    INJECT BLOCK MEDIAL BRANCH CERVICAL/THORACIC/LUMBAR Right 03/04/2021    Procedure: Cervical 3, cervical 4, cervical 5 medial branch nerve blocks to target C3-C4 and C4-C5 facet joint;  Surgeon: Faiza Martinez MD;  Location: [facility] OR    INJECT EPIDURAL CERVICAL N/A 10/27/2022    Procedure: INJECTION, SPINE, CERVICAL, EPIDURAL, C7-T1;  Surgeon: Faiza Martinez MD;  Location: [facility] OR    INJECT EPIDURAL CERVICAL Bilateral 03/30/2023    Procedure: INJECTION, SPINE, CERVICAL C7-T1, EPIDURAL;  Surgeon: Faiza Martinez MD;  Location: [facility] OR    INJECT  EPIDURAL LUMBAR Left 06/08/2023    Procedure: INJECTION, SPINE, LUMBAR, EPIDURAL;  Surgeon: Faiza Martinez MD;  Location: [facility] OR    INJECT EPIDURAL LUMBAR Bilateral 11/30/2023    Procedure: Interlaminar lumbar epidural steroid injection (L3-L4);  Surgeon: Faiza Martinez MD;  Location: [facility] OR    INJECT JOINT SACROILIAC Bilateral 06/08/2023    Procedure: INJECT JOINT SACROILIAC (bilateral);  Surgeon: Faiza Martinez MD;  Location: [facility] OR    INJECT TRIGGER POINT SINGLE / MULTIPLE 1 OR 2 MUSCLES Bilateral 2/8/2024    Procedure: Bilateral piriformis muscle injection under x-ray guidance;  Surgeon: Faiza Martinez MD;  Location: [facility] OR    LAPAROSCOPIC HERNIORRHAPHY HIATAL N/A 02/10/2020    Procedure: LAPAROSCOPIC HIATAL HERNIA REPAIR, NISSEN FUNDOPLICATION, ESOPHOGASTRODUODENOSCOPY, PEG TUBE PLACEMENT.;  Surgeon: Alana Mack MD;  Location: [facility] OR    NASAL/SINUS POLYPECTOMY  2017    Polyps were benign    NOSE SURGERY  2007    deviated septum    TONSILLECTOMY & ADENOIDECTOMY  1981    TUBAL LIGATION  07/01/2011       Medications   I have personally reviewed the patient's medication list.     Allergies  I have personally reviewed the patient's allergy list.     Social History     Socioeconomic History    Marital status:      Spouse name: Carry    Number of children: 5    Years of education: Not on file    Highest education level: Not on file   Occupational History    Not on file   Tobacco Use    Smoking status: Former     Current packs/day: 0.00     Average packs/day: 0.2 packs/day for 22.9 years (4.6 ttl pk-yrs)     Types: Cigarettes     Start date: 1/1/1991     Quit date: 12/1/2013     Years since quitting: 10.5    Smokeless tobacco: Never   Vaping Use    Vaping status: Never Used   Substance and Sexual Activity    Alcohol use: Yes     Comment: Occasionally    Drug use: Yes     Types: Marijuana     Comment: medical marijuana    Sexual activity: Yes     Partners: Male     Birth control/protection: Female  Surgical   Other Topics Concern    Parent/sibling w/ CABG, MI or angioplasty before 65F 55M? No   Social History Narrative    5 kids: living with Radha Ballard/Dino had baby    Leslie,     Shay Brito expecting    Filing for disability        3 yo and 6 months grandchildren Robson     Social Determinants of Health     Financial Resource Strain: Low Risk  (12/15/2023)    Financial Resource Strain     Within the past 12 months, have you or your family members you live with been unable to get utilities (heat, electricity) when it was really needed?: No   Food Insecurity: Low Risk  (12/15/2023)    Food Insecurity     Within the past 12 months, did you worry that your food would run out before you got money to buy more?: No     Within the past 12 months, did the food you bought just not last and you didn t have money to get more?: No   Transportation Needs: Low Risk  (12/15/2023)    Transportation Needs     Within the past 12 months, has lack of transportation kept you from medical appointments, getting your medicines, non-medical meetings or appointments, work, or from getting things that you need?: No   Recent Concern: Transportation Needs - High Risk (9/20/2023)    Transportation Needs     Within the past 12 months, has lack of transportation kept you from medical appointments, getting your medicines, non-medical meetings or appointments, work, or from getting things that you need?: Yes   Physical Activity: Not on file   Stress: Not on file   Social Connections: Not on file   Interpersonal Safety: Not At Risk (6/21/2024)    Received from [facility]     IP Custom IPV     Do you feel UNSAFE in any of your personal relationships with your family members or any other acquaintances?: No   Housing Stability: Low Risk  (12/15/2023)    Housing Stability     Do you have housing? : Yes     Are you worried about losing your housing?: No           Physical Examination    Vitals: /83   Pulse 72   Temp 97.4  F (36.3  C) (Oral)   Resp 16   LMP  (LMP Unknown)   SpO2 100%   General: Lying in bed, NAD  Head: NC/AT  Eyes: no icterus, op pink and moist  Cardiac: RRR. Extremities warm, no edema.   Respiratory: non-labored on RA  GI: S/NT/ND  Skin: No rash or lesion on exposed skin  Psych: Mood pleasant, affect congruent  Neuro:  Mental status: Awake, alert, attentive, oriented to self, time, place, and circumstance. Language is fluent and coherent with intact comprehension of complex commands, naming and repetition.  Cranial nerves: VFF, PERRL, conjugate gaze, EOMI, notes reduced sensation below mid face along the upper lip area, cannot tell the degree, face symmetric, shoulder shrug strong, tongue/uvula midline, no dysarthria.   Motor: Normal bulk and tone. No abnormal movements. 5/5 strength bilaterally in deltoids, biceps, triceps, hand , hip flexors, hip extensors, knee flexion, knee extension, plantarflexion, dorsiflexion.   Reflexes: Normo-reflexic and symmetric biceps, brachioradialis, triceps, patellae, and achilles. Negative Santiago, no clonus, toes down-going.  Sensory: Notes reduced sensation in upper chest,, patchy areas in her arms, patchy areas in left L4, L5, notes over the review sensation both lower extremities but cannot tell how much, left big toe vibratory sensation 8 to 10 seconds, right big toe 25 seconds  coordination: FNF and HS without ataxia or dysmetria. Rapid alternating movements intact.   Gait: Deferred    Investigations   I have personally reviewed pertinent labs, tests, and radiological imaging. Discussion of notable findings is included under Impression.     MRI Brain, MRA head and neck, MRV 06/21/2024  1. No evidence of acute intracranial abnormality.   2. Few foci of T2 prolongation in the supratentorial white matter nonspecific.   Differential considerations include sequela of migraine headaches, prior   inflammation and chronic small  vessel disease.   3. Mild mucosal thickening in the maxillary sinuses with air-fluid level could   represent acute sinusitis. Mild mucosal thickening in the right frontal sinus   and anterior ethmoid air cells.     No evidence of proximal arterial occlusion, aneurysm, dissection, or vascular malformation.   Unremarkable MRA of the neck as far as visualized   Unremarkable MRV of the head as far as visualized.     MRI C and T -spine with and without contrast was done on 5/31/2024 that revealed multilevel degenerative cervical and upper thoracic spondylosis, central cord T2 hyperintensity likely prominent central cord canal versus underlying mild syringohydromyelia, mild to moderate C4-C5 synovitis, chronic moderately severe right C5-C6 foraminal stenosis, MRI T-spine with mild right T4-T5 for minimal narrowing, edematous degenerative endplate changes at C6-C7.    Was patient transferred from outside hospital?   No    Impression  Ada Welch is a 53 year old female with history of Tom-Danlos syndrome type III hypermobility, HTN, hepatomegaly, acquired hypothyroidism, cervical facet joint syndrome and chiari malformation (cervical fusion has not been recommended), POTS/dysautonomia, chronic headache follwed by Dr. Nguyen (felt to be tension, cervicogenic, migraine, and intracranial hypotension), blood patch March 2019 good relief and repeated in 9/19 -- using fioricet,maxalt, aleve, tizanidine, botox), chronic lumbar pain on Suboxone, chronic pain followed by Dr. Martinez (managed with medical cannabis, tramadol, butran, cymbalta and tizanidine) who presented to ED with worsening headaches, neck pain, numbness that gets worse with standing up for the past 10 weeks.    She does describe reduced sensation below mid face, upper chest, patchy areas of reduced sensation in arms, left L4, left L5 area.  Low concern for upper motor neuron lesion deficit is CNS involvement based on exam without upper motor neuron signs,  imaging with brain, C and T-spine without cortical, subcortical, brainstem, cord pathology that would explain her symptoms.  Although some numbness may be explained by right C5/C6 stenosis would not explain numbness in her face, patchy numbness in her left leg.  Also would not explain symptoms onset on standing up that gets better lying down.  She describes her numbness sensation coming after standing up and her headache getting worse.  She does have chronic migraine, current headache without typical photophobia phonophobia but does get worse on standing up.  She is on appropriate preventatives currently on Botox as well as propranolol, per chart review topiramate.  May have medication overuse considering she is taking Tylenol 30 out of 30 days, with NSAIDs as well with unclear number of days.  She would benefit from steroids for now.      She has previously had benefit from blood patch, considering debilitating nature of headache a blood patch can be considered again.  Blood patch was ordered by the outpatient provider, I offered her an observation admit with plan to talk to radiology in the morning if they would consider a blood patch on her to see if it helps.  We discussed that it might need further imaging such as a myelogram but would defer that to radiology.  Alternatively, she could go home with steroids and discussed with her outpatient headache provider to coordinate blood patch with radiology.  We discussed that an observation admit would not necessarily help with her headaches, we also acknowledged that having a blood patch may not also help with her headaches, also a blood patch may not occur on 6/24.  She has an appointment with neurosurgery on 6/25 and wants to talk about Chiari malformation, cervical fusion.  She feels that her symptoms of numbness may be explained by Chiari malformation or cervical degenerative changes and she would benefit from cervical fusion surgery.  She feels that if a blood  patch can be coordinated outpatient getting admitted may not be the best financial decision at this time and in combination with trying to reach her outpatient neurosurgery provider on time was important for her.  She will contact headache clinic, her outpatient provider may not be available and we will try to get recommendations from backup headache provider to get a blood patch.    Recommendations  -Prednisone 60 mg once daily for 5 days start tomorrow  -S/p headache cocktail in the ED  -Contact headache clinic as outpatient for recommendations for blood patch  -Follow-up with neurosurgery as scheduled  -Continue rest of outpatient headache medications.    Thank you for involving Neurology in the care of Ada Welch.  Please do not hesitate to call with questions/concerns (consult pager [phone]).      Patient was discussed with Dr. Bah.    Trevor Plata MD  Neurology PGY-3

## 2024-06-24 NOTE — DISCHARGE INSTRUCTIONS
Please call neurology clinic to set up outpatient blood patch.  Follow-up with neurosurgery in clinic as scheduled.  Start prednisone tomorrow to help with headaches.    If you have worsening symptoms including increased pain, tractable vomiting, fevers or other concerns return to the emergency department for reevaluation.

## 2024-06-25 ENCOUNTER — OFFICE VISIT (OUTPATIENT)
Dept: NEUROSURGERY | Facility: CLINIC | Age: 54
End: 2024-06-25
Payer: COMMERCIAL

## 2024-06-25 VITALS
RESPIRATION RATE: 16 BRPM | DIASTOLIC BLOOD PRESSURE: 82 MMHG | HEIGHT: 68 IN | OXYGEN SATURATION: 98 % | WEIGHT: 202 LBS | SYSTOLIC BLOOD PRESSURE: 133 MMHG | HEART RATE: 70 BPM | BODY MASS INDEX: 30.62 KG/M2

## 2024-06-25 DIAGNOSIS — Q79.60 EHLERS-DANLOS SYNDROME: ICD-10-CM

## 2024-06-25 DIAGNOSIS — M96.1 FAILED BACK SURGICAL SYNDROME: Primary | ICD-10-CM

## 2024-06-25 PROCEDURE — 99213 OFFICE O/P EST LOW 20 MIN: CPT | Performed by: NEUROLOGICAL SURGERY

## 2024-06-25 ASSESSMENT — PAIN SCALES - GENERAL: PAINLEVEL: SEVERE PAIN (7)

## 2024-06-25 NOTE — LETTER
6/25/2024       RE: Ada Welch  [address] [facility] [facility] [id number]     Dear Colleague,    Thank you for referring your patient, Ada Welch, to the [facility] NEUROS[facility]GERY [facility]INIC [place] at [facility]. Please see a copy of my visit note below.    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  She tells me that she has had an extensive amount of workup and treatment for her pain with limited success.  She is overall worried about degeneration of her spine.   More recently she tells me that she was taken off Wegovy because her diabetes improved and this has caused significant weight gain and abnormalities with her glucose control that are distressing to her.  As she began to gain weight she noticed that her chronic neck and back pain became worse and she feels like she is turned down by an extra 30 pounds.    She was most recently in the emergency room for a progressively worsening headache that was concerning for recurrence of CSF leak versus intractable migraine which she says has been intractable for something like 10 weeks.  She was prescribed prednisone which she has not filled and is unsure of.    She describes neck and lower back pain that are chronic in nature and with relatively predictable patterns throughout her day.  When she wakes up in the morning her pain is minimal but present and as she attempts to perform tasks today in the upright position her pain becomes progressively worse in both locations.  If she lies down the pain stops getting worse but is still there.  In the recumbent position she feels like she has  more control over her pain.  The longer she is up more likely she is to have more electrical pains or numbness down her arms and her legs.  She describes her pain as a searing deep pain that builds over time.  She has tried numerous medications for her pain and is currently on she has tried numerous medications for her pain and is currently on buprenorphine, Dilaudid, Topamax, duloxetine.  She has failed gabapentin and Lyrica due to intolerable side effects.  She has had physical therapy at [facility] and she has had some success with pool therapy which seems to stave off some exercise intolerance that she also associates with her dysautonomia.    She tells me that her sleep is poor mostly because she wakes up due to pain whenever she subconsciously rolls over in bed into an uncomfortable position.  She states that she goes to bed about 8 8 and 9 PM and then will fall asleep after an hour or more.  She states that she sleeps for a few to 4 hours every night when she is woken from pain.  She has noticed some increased snoring  as she has gained some of her weight back she has previously been scheduled for sleep studies but has never been able to attend because of multiple.  Illnesses that were serendipitous at the time.    She states that she mostly eats healthy but her weight continues to go up.  She has not seen endocrinologist and Wegovy was started by her primary care doctor.  She has not seen a nutritionist that she recalls.  She reiterates that she did have significant decrease in her pain due to her weight loss but she still had pain at her lowest weight.    She has had a health psychologist in the past to move or is located in [facility] so is an accessible to her and she did learn good techniques of relaxation and meditation for coping with her pain.    She has a diagnosis of anxiety as well as depression in her chart but she reiterates that these are purely secondary to pain.    She is unsure if she  has an autoimmune disorder.  She was previously diagnosed with factor V Leiden but denies any issues with surgeries or any DVTs or PEs.    She has a history of CSF leak and has been treated by Dr. Nguyen with blood patches.  It is thought that this is related to her EDS and the blood patches have generally been working.  She also sees Dr. Nguyen for chronic migraines which are managed with propranolol, Maxalt, sumatriptan, and Botox    She has IBS, GERD, and a previous diagnosis of eosinophilic esophagitis as well as what she tells me diverticulosis.    Additionally she has dysautonomia that is managed by her Cardiologist, Dr. Russell with ivadbridine, which she recently ran out of. Surprisingly she tells me that many of her previous symptoms of poor temperature regulation and POTS-like symptoms have improved from topamax and phentermine.     Overall, she believes that her low back pain and neck pain are the largest drivers of her poor QOL since she is not able to mobilize and has to frequently take breaks. She has generally failed a spine fusion and her pain is more or less worse afterwards. She tells me that she has radicular symptoms down both her legs in addition to her back pain that is exacerbated by fatigue. 10/10 at its max and typically 7-8/10 on average.         Past Medical History:   Diagnosis Date    Allergic rhinitis 09/2007    Anxiety     Arthritis 11/01/2013    Found during search for cause of back pain    Autoimmune disease (H24) 2016    Immunosuppresive    Autonomic dysfunction     Bleeding disorder (H24) 2016    Bruise easily    Blood clotting disorder (H24) 2016    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    Cervicalgia     Chronic sinusitis 00/2007    Diagnosed with chronic pansinusitis 2016    Coagulation disorder (H24)     factor 8    CSF leak     Chiari malformation    Depression     Tom-Danlos disease     Eosinophilic esophagitis 08/2018    Gastroesophageal reflux disease 3/1/2017    I have large  hiatal hernia    Hiatal hernia 2016    Have adeline hiatel hernia    Hoarseness Unsure    It comes and goes    Hypertension     Hypothyroid     IBS (irritable bowel syndrome) with constipation     improved on Linzess    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    Irregular heart beat     Migraines 1/1/2007    Unsure of exact date    Nasal polyps 2013    Were revoved 03/2017, benign    Orthostatic hypotension     Other nervous system complications 1/2014    Autonomic nervous system disorder, neuralgia, neuropathy    Swelling, mass, or lump in head and neck 08/2016    Lymph node has been growing for last year    Thyroid disease 01/01/2008    Urinary incontinence     Urinary urgency        Past Surgical History:   Procedure Laterality Date    AS REPAIR OF NASAL SEPTUM  2009    repair broke nose    BACK SURGERY  12/09/2013  10/01/2014    Spinal fusion L4-S1, L5 moving 5/8ths in. Remove screws/rods, Dr. Vega    BIOPSY  01/01/2008 & 3/2017    mole biopsy, lymph node biopsy    COLONOSCOPY  03/2017    Colonoscopy and GI    ESOPHAGEAL IMPEDENCE FUNCTION TEST WITH 24 HOUR PH GREATER THAN 1 HOUR N/A 09/17/2019    Procedure: IMPEDANCE PH STUDY, ESOPHAGUS, 24 HOUR;  Surgeon: Raghavendra Grande MD;  Location:  GI    ESOPHAGOSCOPY, GASTROSCOPY, DUODENOSCOPY (EGD), COMBINED N/A 08/03/2018    Procedure: COMBINED ESOPHAGOSCOPY, GASTROSCOPY, DUODENOSCOPY (EGD), BIOPSY SINGLE OR MULTIPLE;;  Surgeon: Juan Woodson MD;  Location:  GI    ESOPHAGOSCOPY, GASTROSCOPY, DUODENOSCOPY (EGD), COMBINED N/A 10/22/2018    Procedure: COMBINED ESOPHAGOSCOPY, GASTROSCOPY, DUODENOSCOPY (EGD), BIOPSY SINGLE OR MULTIPLE;  Surgeon: Sadia Carolina MD;  Location: [facility] GI    ESOPHAGOSCOPY, GASTROSCOPY, DUODENOSCOPY (EGD), COMBINED N/A 12/17/2018    Procedure: COMBINED ESOPHAGOSCOPY, GASTROSCOPY, DUODENOSCOPY (EGD);  Surgeon: Juan Woodson MD;  Location:  GI    ESOPHAGOSCOPY, GASTROSCOPY, DUODENOSCOPY (EGD), COMBINED  N/A 10/14/2022    Procedure: ESOPHAGOGASTRODUODENOSCOPY, WITH BIOPSY;  Surgeon: Jay Shaffer MD;  Location: [facility] OR    INJECT BLOCK MEDIAL BRANCH CERVICAL/THORACIC/LUMBAR Left 09/24/2020    Procedure: BLOCK, NERVE, FACET JOINT, MEDIAL BRANCH, DIAGNOSTIC;  Surgeon: Faiza Martinez MD;  [facility]    INJECT BLOCK MEDIAL BRANCH CERVICAL/THORACIC/LUMBAR Bilateral 10/08/2020    Procedure: Bilateral cervical 3, cervical 4, and cervical 5 medial branch nerve block;  Surgeon: Faiza Martinez MD;  Location: [facility] OR    INJECT BLOCK MEDIAL BRANCH CERVICAL/THORACIC/LUMBAR Right 03/04/2021    Procedure: Cervical 3, cervical 4, cervical 5 medial branch nerve blocks to target C3-C4 and C4-C5 facet joint;  Surgeon: Faiza Martinez MD;  Location: [facility] OR    INJECT EPIDURAL CERVICAL N/A 10/27/2022    Procedure: INJECTION, SPINE, CERVICAL, EPIDURAL, C7-T1;  Surgeon: Faiza Martinez MD;  Location: [facility] OR    INJECT EPIDURAL CERVICAL Bilateral 03/30/2023    Procedure: INJECTION, SPINE, CERVICAL C7-T1, EPIDURAL;  Surgeon: Faiza Martinez MD;  Location: [facility] OR    INJECT EPIDURAL LUMBAR Left 06/08/2023    Procedure: INJECTION, SPINE, LUMBAR, EPIDURAL;  Surgeon: Faiza Martinez MD;  Location: [facility] OR    INJECT EPIDURAL LUMBAR Bilateral 11/30/2023    Procedure: Interlaminar lumbar epidural steroid injection (L3-L4);  Surgeon: Faiza Martinez MD;  Location: [facility] OR    INJECT JOINT SACROILIAC Bilateral 06/08/2023    Procedure: INJECT JOINT SACROILIAC (bilateral);  Surgeon: Faiza Martinez MD;  Location: [facility] OR    INJECT TRIGGER POINT SINGLE / MULTIPLE 1 OR 2 MUSCLES Bilateral 2/8/2024    Procedure: Bilateral piriformis muscle injection under x-ray guidance;  Surgeon: Faiza Martinez MD;  Location: [facility] OR    LAPAROSCOPIC HERNIORRHAPHY HIATAL N/A 02/10/2020    Procedure: LAPAROSCOPIC HIATAL HERNIA REPAIR, NISSEN FUNDOPLICATION, ESOPHOGASTRODUODENOSCOPY, PEG TUBE PLACEMENT.;  Surgeon: Alana Mack MD;  Location: [facility] OR    NASAL/SINUS  POLYPECTOMY  2017    Polyps were benign    NOSE SURGERY  2007    deviated septum    TONSILLECTOMY & ADENOIDECTOMY  1981    TUBAL LIGATION  07/01/2011       Family History   Problem Relation Age of Onset    Connective Tissue Disorder Mother         eds    Thyroid Disease Mother     Cancer Father         Malignant tumor on base of spine that grew into spinal column & brain    Migraines Father     Diabetes Maternal Grandmother         Diagnosed later in life    Heart Disease Maternal Grandmother     Hypertension Maternal Grandmother     Diabetes Paternal Grandmother         Elderly diabetes    Cancer Paternal Grandmother         Uterine & Ovarian cancer    Diabetes Paternal Grandfather         Diagnosed later in life    Cancer Paternal Grandfather         Prostate cancer that spread to colon and lungs    Connective Tissue Disorder Sister         eds    Asthma Sister         Pediatric Asthma    Migraines Sister     Thyroid Disease Sister     Thyroid Disease Sister         ,    Migraines Sister     Migraines Sister     Thyroid Disease Sister     Asthma Son         Pediatric Asthma    Breast Cancer No family hx of     Glaucoma No family hx of     Macular Degeneration No family hx of        Social History     Socioeconomic History    Marital status:      Spouse name: Carry    Number of children: 5    Years of education: Not on file    Highest education level: Not on file   Occupational History    Not on file   Tobacco Use    Smoking status: Former     Current packs/day: 0.00     Average packs/day: 0.2 packs/day for 22.9 years (4.6 ttl pk-yrs)     Types: Cigarettes     Start date: 1/1/1991     Quit date: 12/1/2013     Years since quitting: 10.5    Smokeless tobacco: Never   Vaping Use    Vaping status: Never Used   Substance and Sexual Activity    Alcohol use: Yes     Comment: Occasionally    Drug use: Yes     Types: Marijuana     Comment: medical marijuana    Sexual activity: Yes     Partners: Male     Birth  control/protection: Female Surgical   Other Topics Concern    Parent/sibling w/ CABG, MI or angioplasty before 65F 55M? No   Social History Narrative    5 kids: living with Radha Ballard/Dino had baby    Leslie,     Shay Brito expecting    Filing for disability        5 yo and 6 months grandchildren Robson     Social Determinants of Health     Financial Resource Strain: Low Risk  (12/15/2023)    Financial Resource Strain     Within the past 12 months, have you or your family members you live with been unable to get utilities (heat, electricity) when it was really needed?: No   Food Insecurity: Low Risk  (12/15/2023)    Food Insecurity     Within the past 12 months, did you worry that your food would run out before you got money to buy more?: No     Within the past 12 months, did the food you bought just not last and you didn t have money to get more?: No   Transportation Needs: Low Risk  (12/15/2023)    Transportation Needs     Within the past 12 months, has lack of transportation kept you from medical appointments, getting your medicines, non-medical meetings or appointments, work, or from getting things that you need?: No   Recent Concern: Transportation Needs - High Risk (9/20/2023)    Transportation Needs     Within the past 12 months, has lack of transportation kept you from medical appointments, getting your medicines, non-medical meetings or appointments, work, or from getting things that you need?: Yes   Physical Activity: Not on file   Stress: Not on file   Social Connections: Not on file   Interpersonal Safety: Not At Risk (6/21/2024)    Received from [facility]     IP Custom IPV     Do you feel UNSAFE in any of your personal relationships with your family members or any other acquaintances?: No   Housing Stability: Low Risk  (12/15/2023)    Housing Stability     Do you have housing? : Yes     Are you worried about losing your housing?: No           Allergies   Allergen Reactions    Baclofen Other (See Comments)     Loss of muscle tone. Muscle weakness.    Bee Venom Shortness Of Breath, Palpitations, Anaphylaxis and Difficulty breathing     Patient does carry Epi-Pen    Chicken-Derived Products (Egg) Nausea and Diarrhea    Compazine [Prochlorperazine]      Extreme jittery    Food      Milk    Gabapentin      Dizzy    Gluten Meal GI Disturbance     Wheat bran and wheat    Pregabalin     Seasonal Allergies      Dust, mold       Current Outpatient Medications   Medication Sig Dispense Refill    acetaminophen (TYLENOL) 500 MG tablet Take 1,000 mg by mouth every 6 hours as needed for mild pain      atorvastatin (LIPITOR) 20 MG tablet TAKE ONE TABLET BY MOUTH ONCE DAILY 90 tablet 0    buprenorphine (BUTRANS) 10 MCG/HR WK patch PLACE 1 PATCH ONTO THE SKIN ONCE A WEEK 4 patch 1    butalbital-acetaminophen-caffeine (ESGIC) -40 MG tablet Take 1 tablet by mouth every 6 hours as needed for headaches 10 tablet 0    celecoxib (CELEBREX) 100 MG capsule TAKE ONE CAPSULE BY MOUTH TWICE A DAY AS NEEDED FOR MODERATE PAIN. TAKE WITH FOOD 60 capsule 2    cetirizine (ZYRTEC) 10 MG tablet Take 10 mg by mouth daily as needed       co-enzyme Q-10 100 MG CAPS capsule Take 100 mg by mouth daily      cyclobenzaprine (FLEXERIL) 10 MG tablet TAKE ONE TABLET BY MOUTH THREE TIMES A DAY AS NEEDED FOR MUSCLE SPASMS 60 tablet 0    DULoxetine (CYMBALTA) 30 MG capsule TAKE 2 CAPSULES BY MOUTH IN THE MORNING AND 1 CAPSULE BY MOUTH AT BEDTIME 270 capsule 1    EPINEPHrine (ANY BX GENERIC EQUIV) 0.3 MG/0.3ML injection 2-pack Inject 0.3 mLs (0.3 mg) into the muscle as needed for anaphylaxis May repeat one time in 5-15 minutes if response to initial dose is inadequate. 2 each 1    ferrous fumarate 65 mg, Bois Forte. FE,-Vitamin C 125 mg (VITRON-C)  MG TABS tablet Take 1 tablet by mouth every other day 60 tablet 4    fluticasone (FLONASE) 50 MCG/ACT nasal spray Spray 2 sprays into both nostrils  daily Call clinic to schedule follow up appointment. 48 mL 11    guanFACINE (TENEX) 1 MG tablet TAKE ONE TAB BY MOUTH ONCE NIGHTLY AT BEDTIME      HYDROmorphone (DILAUDID) 2 MG tablet Take 1 tablet (2 mg) by mouth 2 times daily as needed for pain 20 tablet 0    hydrOXYzine HCl (ATARAX) 25 MG tablet TAKE 1 TABLET (25 MG) BY MOUTH EVERY 6 HOURS AS NEEDED FOR ANXIETY 180 tablet 1    ibuprofen ([employer] IBUPROFEN) 200 MG capsule Take 600 mg by mouth every 4 hours as needed for fever      ivabradine (CORLANOR) 7.5 MG tablet Take 1 tablet (7.5 mg) by mouth 2 times daily 180 tablet 3    Lidocaine (LIDOCARE) 4 % Patch Place 1-3 patches onto the skin every 24 hours To prevent lidocaine toxicity, patient should be patch free for 12 hrs daily. 20 patch 0    medical cannabis (Patient's own supply.  Not a prescription) Take 1 Dose by mouth See Admin Instructions Capsule formulation - uses as needed      methylPREDNISolone (MEDROL DOSEPAK) 4 MG tablet therapy pack Follow Package Directions 21 tablet 0    metoclopramide (REGLAN) 10 MG tablet Take 1 tablet (10 mg) by mouth 4 times daily as needed (headache) 40 tablet 3    Multiple Vitamins-Minerals (MULTIVITAMIN PO) Take 1 tablet by mouth daily       naloxone (NARCAN) 4 MG/0.1ML nasal spray Spray 1 spray (4 mg) into one nostril alternating nostrils once as needed for opioid reversal every 2-3 minutes until assistance arrives 0.2 mL 0    phentermine 30 MG capsule Take 1 capsule (30 mg) by mouth every morning 30 capsule 1    predniSONE (DELTASONE) 20 MG tablet Take 3 tablets (60 mg) by mouth daily for 5 days 15 tablet 0    Probiotic Product (PROBIOTIC DAILY PO) Take 6 packets by mouth every morning Five-Lac      propranolol ER (INDERAL LA) 120 MG 24 hr capsule Take 1 capsule (120 mg) by mouth daily 90 capsule 3    ramelteon (ROZEREM) 8 MG tablet TAKE 1 TABLET (8 MG) BY MOUTH AT BEDTIME 30 tablet 3    rizatriptan (MAXALT-MLT) 5 MG ODT TAKE 1-2 TABLETS (5-10 MG) BY MOUTH AT ONSET OF  "HEADACHE FOR MIGRAINE (MAX 30 MG IN 24 HOURS) 18 tablet 11    SUMAtriptan (IMITREX STATDOSE) 6 MG/0.5ML pen injector kit Inject 0.5 ml (6 mg) subcutaneously at onset of migraine, May repeat in 1 hour , Max 12 mg/24 hrs 2 kit 11    topiramate (TOPAMAX) 100 MG tablet Take 1 tablet (100 mg) by mouth daily 90 tablet 3    traMADol (ULTRAM) 50 MG tablet TAKE ONE TABLET BY MOUTH EVERY 6 HOURS AS NEEDED FOR PAIN 65 tablet 0    vitamin B complex with vitamin C (STRESS TAB) tablet Take 1 tablet by mouth daily      VITAMIN D, CHOLECALCIFEROL, PO Take 2,000 Units by mouth daily       Current Facility-Administered Medications   Medication Dose Route Frequency Provider Last Rate Last Admin    Botulinum Toxin Type A (BOTOX) 200 units injection 155 Units  155 Units Intramuscular See Admin Instructions Noemy Nguyen MD   155 Units at 05/28/24 0905    Botulinum Toxin Type A (BOTOX) 200 units injection 155 Units  155 Units Intramuscular Q12 weeks Kristin Murphy MD        Botulinum Toxin Type A (BOTOX) 200 units injection 155 Units  155 Units Intramuscular See Admin Instructions Noemy Nguyen MD   155 Units at 03/28/23 1442             Physical Exam  /82 (BP Location: Right arm, Patient Position: Sitting)   Pulse 70   Resp 16   Ht 1.727 m (5' 8\")   Wt 91.6 kg (202 lb)   LMP  (LMP Unknown)   SpO2 98%   BMI 30.71 kg/m        Wt Readings from Last 10 Encounters:   06/25/24 91.6 kg (202 lb)   05/21/24 85.3 kg (188 lb)   05/03/24 87.4 kg (192 lb 9.6 oz)   04/01/24 85.5 kg (188 lb 6.4 oz)   03/14/24 81.2 kg (179 lb)   12/15/23 81.2 kg (179 lb)   09/21/23 89.8 kg (198 lb)   09/15/23 88.5 kg (195 lb)   08/10/23 93.4 kg (206 lb)   07/18/23 93.7 kg (206 lb 8 oz)        General: Awake, alert, oriented. Well nourished, well developed, no acute distress. Lies down skilled nursing through the appointment  HEENT: Head normocephalic, atraumatic.   Extremity: Warm with no clubbing or cyanosis. No lower extremity " edema.  Back: diffuse tenderness to palpation around L5 and C4. Otherwise nontender.    Neurological  Awake, alert and oriented to date, time, place and person. Speech fluent.   Pupils equal, round, reactive to light.  Extraocular movement intact.  Visual fields are full on confrontation.  Hearing is grossly normal to finger rub.   Facial sensation intact.  Face symmetric.  Tongue midline.  Uvula elevates equally.    Motor: full strength throughout.  Sensation: intact to light touch except some reduction in light touch over left lateral leg.   Deep tendon reflexes: 2+ throughout. Negative for clonus.  ROS: 10 point ROS neg other than the symptoms noted above in the HPI.        Imaging: my interpretations only  MRI cervical/ thoracic/ lumbar: Mild degenerative changes throughout but age-appropriate.  Previous fusion at L4-5 and L5-S1.  Otherwise no significant structural abnormalities that could explain her ongoing pain        Assessment and Plan   Ada Welch is a 53 year old female with a complex medical history most pertinent for Tom-Danlos, dysautonomia, obesity, previous prediabetes, and longstanding chronic neck and back pain.  She has been extensively treated with conservative therapies including medications and therapy for multiple years.  She has also seen a health psychologist and has experience with meditation and breathing techniques for acute flares for her pain.  She does have documentation of depression and anxiety reiterates that this is secondary to her ongoing chronic pain and is stable at the moment.  Despite this she previously underwent a fusion which did not significantly impact her pain trajectory and she is now mostly disabled from her ongoing and chronic pain.  She does have a previous history of a Chiari malformation which is not evident on her MRIs.  We discussed this diagnosis and that I do not believe that she exhibits the signs or symptoms of Chiari or meets radiologic criteria  for Chiari.  She does not have diffuse tenderness such as would be exhibited and fibromyalgia but she does have a history of chronic migraine that has been extensively treated by Dr. Nguyen and neurology.  It is unclear if she has undergone a trial of CGRP antagonism but will be following up with Dr. Nguyen.  We discussed her pain in the context of chronic overlapping pain conditions and a connective tissue disorder.  We discussed extensively that her goals of becoming mobile in order to be a present mother and grandmother as well as eventually run again will require a comprehensive team based approach.  I do not believe that there is any single intervention that we will ameliorate her pain.  She understands this and would like to make iterative progress and is excited about a team-based approach.     She seems to previously benefit from weight loss and has since gained some weight back after ending her GI LP 1 medication.  She is scheduled to meet with endocrinologist and hopefully a nutritionist to discuss starting this medication again.  If she found significant benefit for her chronic pain then I encouraged her to pursue this to see if she can get back again if this is found advisable by her endocrinologist.    Today we discussed spinal cord stimulation in the context of her failed back surgery syndrome and ongoing chronic pain.  We discussed that most importantly she would continue to follow-up for her weight loss plan and then we would proceed corroborative with support from the rest of her care team including her PCP Dr. Collins.  We discussed that spinal cord stimulation can provide effective pain control for chronic back and sometimes chronic neck pain but we would focus on prior testing her low back to see how she responded to a trial of stimulation.  She understands that this will provide her some control over stimulation and allow her to make it through acute flares perhaps with better durability.   Obesity we also discussed the alternatives including continuing with structured PT for which I encouraged her to continue as well as regular pool therapy that includes heart rate elevation for the activity guidelines we discussed extensively the risks of spinal cord stimulation including infection, bleeding including spinal cord injury which could result in paralysis or loss of sensory or autonomic function, movement of the electrodes, and efficacy, CSF leak, amongst others.  She understands that we would proceed with a percutaneous approach which would minimize these risks but if extensive scar tissue was encountered and we have to return for an open approach.  In addition she understands that she have to be cleared by neuropsychologist in order to move forward as well as her primary care provider.  We discussed moving forward with the plan and seeing how the timing works out with her other appointments.  For now we will start with a neuropsychologist and I will copy the rest of her care team.    Neuropsychology evaluation for spinal cord stimulation  Follow-up with primary care provider  Follow-up with endocrinologist  Follow-up with Dr. Nguyen        Again, thank you for allowing me to participate in the care of your patient.      Sincerely,    Kirk Huber MD

## 2024-06-25 NOTE — PROGRESS NOTES
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  She tells me that she has had an extensive amount of workup and treatment for her pain with limited success.  She is overall worried about degeneration of her spine.   More recently she tells me that she was taken off Wegovy because her diabetes improved and this has caused significant weight gain and abnormalities with her glucose control that are distressing to her.  As she began to gain weight she noticed that her chronic neck and back pain became worse and she feels like she is turned down by an extra 30 pounds.    She was most recently in the emergency room for a progressively worsening headache that was concerning for recurrence of CSF leak versus intractable migraine which she says has been intractable for something like 10 weeks.  She was prescribed prednisone which she has not filled and is unsure of.    She describes neck and lower back pain that are chronic in nature and with relatively predictable patterns throughout her day.  When she wakes up in the morning her pain is minimal but present and as she attempts to perform tasks today in the upright position her pain becomes progressively worse in both locations.  If she lies down the pain stops getting worse but is still there.  In the recumbent position she feels like she has more control over her pain.  The longer she is up more likely she is to have more electrical pains or numbness down her arms and her legs.  She describes her pain as a searing deep pain that builds over time.  She has tried numerous medications for her pain and is currently on she has tried numerous medications for her  pain and is currently on buprenorphine, Dilaudid, Topamax, duloxetine.  She has failed gabapentin and Lyrica due to intolerable side effects.  She has had physical therapy at [facility] and she has had some success with pool therapy which seems to stave off some exercise intolerance that she also associates with her dysautonomia.    She tells me that her sleep is poor mostly because she wakes up due to pain whenever she subconsciously rolls over in bed into an uncomfortable position.  She states that she goes to bed about 8 8 and 9 PM and then will fall asleep after an hour or more.  She states that she sleeps for a few to 4 hours every night when she is woken from pain.  She has noticed some increased snoring  as she has gained some of her weight back she has previously been scheduled for sleep studies but has never been able to attend because of multiple.  Illnesses that were serendipitous at the time.    She states that she mostly eats healthy but her weight continues to go up.  She has not seen endocrinologist and Wegovy was started by her primary care doctor.  She has not seen a nutritionist that she recalls.  She reiterates that she did have significant decrease in her pain due to her weight loss but she still had pain at her lowest weight.    She has had a health psychologist in the past to move or is located in [facility] so is an accessible to her and she did learn good techniques of relaxation and meditation for coping with her pain.    She has a diagnosis of anxiety as well as depression in her chart but she reiterates that these are purely secondary to pain.    She is unsure if she has an autoimmune disorder.  She was previously diagnosed with factor V Leiden but denies any issues with surgeries or any DVTs or PEs.    She has a history of CSF leak and has been treated by Dr. Nguyen with blood patches.  It is thought that this is related to her EDS and the blood patches have generally been working.   She also sees Dr. Nguyen for chronic migraines which are managed with propranolol, Maxalt, sumatriptan, and Botox    She has IBS, GERD, and a previous diagnosis of eosinophilic esophagitis as well as what she tells me diverticulosis.    Additionally she has dysautonomia that is managed by her Cardiologist, Dr. Russell with ivadbridine, which she recently ran out of. Surprisingly she tells me that many of her previous symptoms of poor temperature regulation and POTS-like symptoms have improved from topamax and phentermine.     Overall, she believes that her low back pain and neck pain are the largest drivers of her poor QOL since she is not able to mobilize and has to frequently take breaks. She has generally failed a spine fusion and her pain is more or less worse afterwards. She tells me that she has radicular symptoms down both her legs in addition to her back pain that is exacerbated by fatigue. 10/10 at its max and typically 7-8/10 on average.         Past Medical History:   Diagnosis Date    Allergic rhinitis 09/2007    Anxiety     Arthritis 11/01/2013    Found during search for cause of back pain    Autoimmune disease (H24) 2016    Immunosuppresive    Autonomic dysfunction     Bleeding disorder (H24) 2016    Bruise easily    Blood clotting disorder (H24) 2016    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    Cervicalgia     Chronic sinusitis 00/2007    Diagnosed with chronic pansinusitis 2016    Coagulation disorder (H24)     factor 8    CSF leak     Chiari malformation    Depression     Tom-Danlos disease     Eosinophilic esophagitis 08/2018    Gastroesophageal reflux disease 3/1/2017    I have large hiatal hernia    Hiatal hernia 2016    Have adeline hiatel hernia    Hoarseness Unsure    It comes and goes    Hypertension     Hypothyroid     IBS (irritable bowel syndrome) with constipation     improved on Linzess    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    Irregular heart beat     Migraines  1/1/2007    Unsure of exact date    Nasal polyps 2013    Were revoved 03/2017, benign    Orthostatic hypotension     Other nervous system complications 1/2014    Autonomic nervous system disorder, neuralgia, neuropathy    Swelling, mass, or lump in head and neck 08/2016    Lymph node has been growing for last year    Thyroid disease 01/01/2008    Urinary incontinence     Urinary urgency        Past Surgical History:   Procedure Laterality Date    AS REPAIR OF NASAL SEPTUM  2009    repair broke nose    BACK SURGERY  12/09/2013  10/01/2014    Spinal fusion L4-S1, L5 moving 5/8ths in. Remove screws/rods, Dr. Vega    BIOPSY  01/01/2008 & 3/2017    mole biopsy, lymph node biopsy    COLONOSCOPY  03/2017    Colonoscopy and GI    ESOPHAGEAL IMPEDENCE FUNCTION TEST WITH 24 HOUR PH GREATER THAN 1 HOUR N/A 09/17/2019    Procedure: IMPEDANCE PH STUDY, ESOPHAGUS, 24 HOUR;  Surgeon: Raghavendra Grande MD;  Location:  GI    ESOPHAGOSCOPY, GASTROSCOPY, DUODENOSCOPY (EGD), COMBINED N/A 08/03/2018    Procedure: COMBINED ESOPHAGOSCOPY, GASTROSCOPY, DUODENOSCOPY (EGD), BIOPSY SINGLE OR MULTIPLE;;  Surgeon: Juan Woodson MD;  Location: [facility]    ESOPHAGOSCOPY, GASTROSCOPY, DUODENOSCOPY (EGD), COMBINED N/A 10/22/2018    Procedure: COMBINED ESOPHAGOSCOPY, GASTROSCOPY, DUODENOSCOPY (EGD), BIOPSY SINGLE OR MULTIPLE;  Surgeon: Sadia Carolina MD;  [facility]    ESOPHAGOSCOPY, GASTROSCOPY, DUODENOSCOPY (EGD), COMBINED N/A 12/17/2018    Procedure: COMBINED ESOPHAGOSCOPY, GASTROSCOPY, DUODENOSCOPY (EGD);  Surgeon: Juan Woodson MD;  [facility]    ESOPHAGOSCOPY, GASTROSCOPY, DUODENOSCOPY (EGD), COMBINED N/A 10/14/2022    Procedure: ESOPHAGOGASTRODUODENOSCOPY, WITH BIOPSY;  Surgeon: Jay Shaffer MD;  Location: [facility] OR    INJECT BLOCK MEDIAL BRANCH CERVICAL/THORACIC/LUMBAR Left 09/24/2020    Procedure: BLOCK, NERVE, FACET JOINT, MEDIAL BRANCH, DIAGNOSTIC;  Surgeon: Faiza Martinez MD;  [facility]    INJECT BLOCK MEDIAL  BRANCH CERVICAL/THORACIC/LUMBAR Bilateral 10/08/2020    Procedure: Bilateral cervical 3, cervical 4, and cervical 5 medial branch nerve block;  Surgeon: Faiza Martinez MD;  Location: [facility] OR    INJECT BLOCK MEDIAL BRANCH CERVICAL/THORACIC/LUMBAR Right 03/04/2021    Procedure: Cervical 3, cervical 4, cervical 5 medial branch nerve blocks to target C3-C4 and C4-C5 facet joint;  Surgeon: Faiza Martinez MD;  Location: [facility] OR    INJECT EPIDURAL CERVICAL N/A 10/27/2022    Procedure: INJECTION, SPINE, CERVICAL, EPIDURAL, C7-T1;  Surgeon: Faiza Martinez MD;  Location: [facility] OR    INJECT EPIDURAL CERVICAL Bilateral 03/30/2023    Procedure: INJECTION, SPINE, CERVICAL C7-T1, EPIDURAL;  Surgeon: Faiza Martinez MD;  Location: [facility]    INJECT EPIDURAL LUMBAR Left 06/08/2023    Procedure: INJECTION, SPINE, LUMBAR, EPIDURAL;  Surgeon: Faiza Martinez MD;  Location: [facility] OR    INJECT EPIDURAL LUMBAR Bilateral 11/30/2023    Procedure: Interlaminar lumbar epidural steroid injection (L3-L4);  Surgeon: Faiza Martinez MD;  Location: [facility] OR    INJECT JOINT SACROILIAC Bilateral 06/08/2023    Procedure: INJECT JOINT SACROILIAC (bilateral);  Surgeon: Faiza Martinez MD;  Location: [facility] OR    INJECT TRIGGER POINT SINGLE / MULTIPLE 1 OR 2 MUSCLES Bilateral 2/8/2024    Procedure: Bilateral piriformis muscle injection under x-ray guidance;  Surgeon: Faiza Martinez MD;  Location: [facility]    LAPAROSCOPIC HERNIORRHAPHY HIATAL N/A 02/10/2020    Procedure: LAPAROSCOPIC HIATAL HERNIA REPAIR, NISSEN FUNDOPLICATION, ESOPHOGASTRODUODENOSCOPY, PEG TUBE PLACEMENT.;  Surgeon: Alana Mack MD;  Location: [facility]    NASAL/SINUS POLYPECTOMY  2017    Polyps were benign    NOSE SURGERY  2007    deviated septum    TONSILLECTOMY & ADENOIDECTOMY  1981    TUBAL LIGATION  07/01/2011       Family History   Problem Relation Age of Onset    Connective Tissue Disorder Mother         eds    Thyroid Disease Mother     Cancer Father         Malignant tumor on base  of spine that grew into spinal column & brain    Migraines Father     Diabetes Maternal Grandmother         Diagnosed later in life    Heart Disease Maternal Grandmother     Hypertension Maternal Grandmother     Diabetes Paternal Grandmother         Elderly diabetes    Cancer Paternal Grandmother         Uterine & Ovarian cancer    Diabetes Paternal Grandfather         Diagnosed later in life    Cancer Paternal Grandfather         Prostate cancer that spread to colon and lungs    Connective Tissue Disorder Sister         eds    Asthma Sister         Pediatric Asthma    Migraines Sister     Thyroid Disease Sister     Thyroid Disease Sister         ,    Migraines Sister     Migraines Sister     Thyroid Disease Sister     Asthma Son         Pediatric Asthma    Breast Cancer No family hx of     Glaucoma No family hx of     Macular Degeneration No family hx of        Social History     Socioeconomic History    Marital status:      Spouse name: Carry    Number of children: 5    Years of education: Not on file    Highest education level: Not on file   Occupational History    Not on file   Tobacco Use    Smoking status: Former     Current packs/day: 0.00     Average packs/day: 0.2 packs/day for 22.9 years (4.6 ttl pk-yrs)     Types: Cigarettes     Start date: 1/1/1991     Quit date: 12/1/2013     Years since quitting: 10.5    Smokeless tobacco: Never   Vaping Use    Vaping status: Never Used   Substance and Sexual Activity    Alcohol use: Yes     Comment: Occasionally    Drug use: Yes     Types: Marijuana     Comment: medical marijuana    Sexual activity: Yes     Partners: Male     Birth control/protection: Female Surgical   Other Topics Concern    Parent/sibling w/ CABG, MI or angioplasty before 65F 55M? No   Social History Narrative    5 kids: living with Radha Ballard/Dino had baby    Shay Nelson expecting    Filing for disability        5 yo and 6 months grandchildren Robson      Social Determinants of Health     Financial Resource Strain: Low Risk  (12/15/2023)    Financial Resource Strain     Within the past 12 months, have you or your family members you live with been unable to get utilities (heat, electricity) when it was really needed?: No   Food Insecurity: Low Risk  (12/15/2023)    Food Insecurity     Within the past 12 months, did you worry that your food would run out before you got money to buy more?: No     Within the past 12 months, did the food you bought just not last and you didn t have money to get more?: No   Transportation Needs: Low Risk  (12/15/2023)    Transportation Needs     Within the past 12 months, has lack of transportation kept you from medical appointments, getting your medicines, non-medical meetings or appointments, work, or from getting things that you need?: No   Recent Concern: Transportation Needs - High Risk (9/20/2023)    Transportation Needs     Within the past 12 months, has lack of transportation kept you from medical appointments, getting your medicines, non-medical meetings or appointments, work, or from getting things that you need?: Yes   Physical Activity: Not on file   Stress: Not on file   Social Connections: Not on file   Interpersonal Safety: Not At Risk (6/21/2024)    Received from [facility]     IP Custom IPV     Do you feel UNSAFE in any of your personal relationships with your family members or any other acquaintances?: No   Housing Stability: Low Risk  (12/15/2023)    Housing Stability     Do you have housing? : Yes     Are you worried about losing your housing?: No          Allergies   Allergen Reactions    Baclofen Other (See Comments)     Loss of muscle tone. Muscle weakness.    Bee Venom Shortness Of Breath, Palpitations, Anaphylaxis and Difficulty breathing     Patient does carry Epi-Pen    Chicken-Derived Products (Egg) Nausea and Diarrhea    Compazine [Prochlorperazine]      Extreme jittery     Food      Milk    Gabapentin      Dizzy    Gluten Meal GI Disturbance     Wheat bran and wheat    Pregabalin     Seasonal Allergies      Dust, mold       Current Outpatient Medications   Medication Sig Dispense Refill    acetaminophen (TYLENOL) 500 MG tablet Take 1,000 mg by mouth every 6 hours as needed for mild pain      atorvastatin (LIPITOR) 20 MG tablet TAKE ONE TABLET BY MOUTH ONCE DAILY 90 tablet 0    buprenorphine (BUTRANS) 10 MCG/HR WK patch PLACE 1 PATCH ONTO THE SKIN ONCE A WEEK 4 patch 1    butalbital-acetaminophen-caffeine (ESGIC) -40 MG tablet Take 1 tablet by mouth every 6 hours as needed for headaches 10 tablet 0    celecoxib (CELEBREX) 100 MG capsule TAKE ONE CAPSULE BY MOUTH TWICE A DAY AS NEEDED FOR MODERATE PAIN. TAKE WITH FOOD 60 capsule 2    cetirizine (ZYRTEC) 10 MG tablet Take 10 mg by mouth daily as needed       co-enzyme Q-10 100 MG CAPS capsule Take 100 mg by mouth daily      cyclobenzaprine (FLEXERIL) 10 MG tablet TAKE ONE TABLET BY MOUTH THREE TIMES A DAY AS NEEDED FOR MUSCLE SPASMS 60 tablet 0    DULoxetine (CYMBALTA) 30 MG capsule TAKE 2 CAPSULES BY MOUTH IN THE MORNING AND 1 CAPSULE BY MOUTH AT BEDTIME 270 capsule 1    EPINEPHrine (ANY BX GENERIC EQUIV) 0.3 MG/0.3ML injection 2-pack Inject 0.3 mLs (0.3 mg) into the muscle as needed for anaphylaxis May repeat one time in 5-15 minutes if response to initial dose is inadequate. 2 each 1    ferrous fumarate 65 mg, [place] [employer]. FE,-Vitamin C 125 mg (VITRON-C)  MG TABS tablet Take 1 tablet by mouth every other day 60 tablet 4    fluticasone (FLONASE) 50 MCG/ACT nasal spray Spray 2 sprays into both nostrils daily Call clinic to schedule follow up appointment. 48 mL 11    guanFACINE (TENEX) 1 MG tablet TAKE ONE TAB BY MOUTH ONCE NIGHTLY AT BEDTIME      HYDROmorphone (DILAUDID) 2 MG tablet Take 1 tablet (2 mg) by mouth 2 times daily as needed for pain 20 tablet 0    hydrOXYzine HCl (ATARAX) 25 MG tablet TAKE 1 TABLET (25 MG) BY MOUTH  EVERY 6 HOURS AS NEEDED FOR ANXIETY 180 tablet 1    ibuprofen ([employer] IBUPROFEN) 200 MG capsule Take 600 mg by mouth every 4 hours as needed for fever      ivabradine (CORLANOR) 7.5 MG tablet Take 1 tablet (7.5 mg) by mouth 2 times daily 180 tablet 3    Lidocaine (LIDOCARE) 4 % Patch Place 1-3 patches onto the skin every 24 hours To prevent lidocaine toxicity, patient should be patch free for 12 hrs daily. 20 patch 0    medical cannabis (Patient's own supply.  Not a prescription) Take 1 Dose by mouth See Admin Instructions Capsule formulation - uses as needed      methylPREDNISolone (MEDROL DOSEPAK) 4 MG tablet therapy pack Follow Package Directions 21 tablet 0    metoclopramide (REGLAN) 10 MG tablet Take 1 tablet (10 mg) by mouth 4 times daily as needed (headache) 40 tablet 3    Multiple Vitamins-Minerals (MULTIVITAMIN PO) Take 1 tablet by mouth daily       naloxone (NARCAN) 4 MG/0.1ML nasal spray Spray 1 spray (4 mg) into one nostril alternating nostrils once as needed for opioid reversal every 2-3 minutes until assistance arrives 0.2 mL 0    phentermine 30 MG capsule Take 1 capsule (30 mg) by mouth every morning 30 capsule 1    predniSONE (DELTASONE) 20 MG tablet Take 3 tablets (60 mg) by mouth daily for 5 days 15 tablet 0    Probiotic Product (PROBIOTIC DAILY PO) Take 6 packets by mouth every morning Five-Lac      propranolol ER (INDERAL LA) 120 MG 24 hr capsule Take 1 capsule (120 mg) by mouth daily 90 capsule 3    ramelteon (ROZEREM) 8 MG tablet TAKE 1 TABLET (8 MG) BY MOUTH AT BEDTIME 30 tablet 3    rizatriptan (MAXALT-MLT) 5 MG ODT TAKE 1-2 TABLETS (5-10 MG) BY MOUTH AT ONSET OF HEADACHE FOR MIGRAINE (MAX 30 MG IN 24 HOURS) 18 tablet 11    SUMAtriptan (IMITREX STATDOSE) 6 MG/0.5ML pen injector kit Inject 0.5 ml (6 mg) subcutaneously at onset of migraine, May repeat in 1 hour , Max 12 mg/24 hrs 2 kit 11    topiramate (TOPAMAX) 100 MG tablet Take 1 tablet (100 mg) by mouth daily 90 tablet 3    traMADol (ULTRAM)  "50 MG tablet TAKE ONE TABLET BY MOUTH EVERY 6 HOURS AS NEEDED FOR PAIN 65 tablet 0    vitamin B complex with vitamin C (STRESS TAB) tablet Take 1 tablet by mouth daily      VITAMIN D, CHOLECALCIFEROL, PO Take 2,000 Units by mouth daily       Current Facility-Administered Medications   Medication Dose Route Frequency Provider Last Rate Last Admin    Botulinum Toxin Type A (BOTOX) 200 units injection 155 Units  155 Units Intramuscular See Admin Instructions Noemy Nguyen MD   155 Units at 05/28/24 0905    Botulinum Toxin Type A (BOTOX) 200 units injection 155 Units  155 Units Intramuscular Q12 weeks Kristin Murphy MD        Botulinum Toxin Type A (BOTOX) 200 units injection 155 Units  155 Units Intramuscular See Admin Instructions Noemy Nguyen MD   155 Units at 03/28/23 1442             Physical Exam  /82 (BP Location: Right arm, Patient Position: Sitting)   Pulse 70   Resp 16   Ht 1.727 m (5' 8\")   Wt 91.6 kg (202 lb)   LMP  (LMP Unknown)   SpO2 98%   BMI 30.71 kg/m        Wt Readings from Last 10 Encounters:   06/25/24 91.6 kg (202 lb)   05/21/24 85.3 kg (188 lb)   05/03/24 87.4 kg (192 lb 9.6 oz)   04/01/24 85.5 kg (188 lb 6.4 oz)   03/14/24 81.2 kg (179 lb)   12/15/23 81.2 kg (179 lb)   09/21/23 89.8 kg (198 lb)   09/15/23 88.5 kg (195 lb)   08/10/23 93.4 kg (206 lb)   07/18/23 93.7 kg (206 lb 8 oz)        General: Awake, alert, oriented. Well nourished, well developed, no acute distress. Lies down skilled nursing through the appointment  HEENT: Head normocephalic, atraumatic.   Extremity: Warm with no clubbing or cyanosis. No lower extremity edema.  Back: diffuse tenderness to palpation around L5 and C4. Otherwise nontender.    Neurological  Awake, alert and oriented to date, time, place and person. Speech fluent.   Pupils equal, round, reactive to light.  Extraocular movement intact.  Visual fields are full on confrontation.  Hearing is grossly normal to finger rub. "   Facial sensation intact.  Face symmetric.  Tongue midline.  Uvula elevates equally.    Motor: full strength throughout.  Sensation: intact to light touch except some reduction in light touch over left lateral leg.   Deep tendon reflexes: 2+ throughout. Negative for clonus.  ROS: 10 point ROS neg other than the symptoms noted above in the HPI.        Imaging: my interpretations only  MRI cervical/ thoracic/ lumbar: Mild degenerative changes throughout but age-appropriate.  Previous fusion at L4-5 and L5-S1.  Otherwise no significant structural abnormalities that could explain her ongoing pain        Assessment and Plan   Ada Welch is a 53 year old female with a complex medical history most pertinent for Tom-Danlos, dysautonomia, obesity, previous prediabetes, and longstanding chronic neck and back pain.  She has been extensively treated with conservative therapies including medications and therapy for multiple years.  She has also seen a health psychologist and has experience with meditation and breathing techniques for acute flares for her pain.  She does have documentation of depression and anxiety reiterates that this is secondary to her ongoing chronic pain and is stable at the moment.  Despite this she previously underwent a fusion which did not significantly impact her pain trajectory and she is now mostly disabled from her ongoing and chronic pain.  She does have a previous history of a Chiari malformation which is not evident on her MRIs.  We discussed this diagnosis and that I do not believe that she exhibits the signs or symptoms of Chiari or meets radiologic criteria for Chiari.  She does not have diffuse tenderness such as would be exhibited and fibromyalgia but she does have a history of chronic migraine that has been extensively treated by Dr. Nugyen and neurology.  It is unclear if she has undergone a trial of CGRP antagonism but will be following up with Dr. Nguyen.  We discussed her pain  in the context of chronic overlapping pain conditions and a connective tissue disorder.  We discussed extensively that her goals of becoming mobile in order to be a present mother and grandmother as well as eventually run again will require a comprehensive team based approach.  I do not believe that there is any single intervention that we will ameliorate her pain.  She understands this and would like to make iterative progress and is excited about a team-based approach.     She seems to previously benefit from weight loss and has since gained some weight back after ending her GI LP 1 medication.  She is scheduled to meet with endocrinologist and hopefully a nutritionist to discuss starting this medication again.  If she found significant benefit for her chronic pain then I encouraged her to pursue this to see if she can get back again if this is found advisable by her endocrinologist.    Today we discussed spinal cord stimulation in the context of her failed back surgery syndrome and ongoing chronic pain.  We discussed that most importantly she would continue to follow-up for her weight loss plan and then we would proceed corroborative with support from the rest of her care team including her PCP Dr. Collins.  We discussed that spinal cord stimulation can provide effective pain control for chronic back and sometimes chronic neck pain but we would focus on prior testing her low back to see how she responded to a trial of stimulation.  She understands that this will provide her some control over stimulation and allow her to make it through acute flares perhaps with better durability.  Obesity we also discussed the alternatives including continuing with structured PT for which I encouraged her to continue as well as regular pool therapy that includes heart rate elevation for the activity guidelines we discussed extensively the risks of spinal cord stimulation including infection, bleeding including spinal cord injury  which could result in paralysis or loss of sensory or autonomic function, movement of the electrodes, and efficacy, CSF leak, amongst others.  She understands that we would proceed with a percutaneous approach which would minimize these risks but if extensive scar tissue was encountered and we have to return for an open approach.  In addition she understands that she have to be cleared by neuropsychologist in order to move forward as well as her primary care provider.  We discussed moving forward with the plan and seeing how the timing works out with her other appointments.  For now we will start with a neuropsychologist and I will copy the rest of her care team.    Neuropsychology evaluation for spinal cord stimulation  Follow-up with primary care provider  Follow-up with endocrinologist  Follow-up with Dr. Wendy Huber MD  Department of Neurosurgery  [facility]

## 2024-06-26 ENCOUNTER — TELEPHONE (OUTPATIENT)
Dept: ANESTHESIOLOGY | Facility: CLINIC | Age: 54
End: 2024-06-26
Payer: COMMERCIAL

## 2024-06-26 PROBLEM — G97.1 POST-DURAL PUNCTURE HEADACHE: Status: ACTIVE | Noted: 2024-06-21

## 2024-06-26 RX ORDER — ATORVASTATIN CALCIUM 20 MG/1
20 TABLET, FILM COATED ORAL DAILY
Qty: 90 TABLET | Refills: 3 | Status: SHIPPED | OUTPATIENT
Start: 2024-06-26

## 2024-06-26 NOTE — TELEPHONE ENCOUNTER
Called patient to schedule procedure with Dr. Eaton    Date of Procedure: 7/2/24    Arrival time given: Yes: Arrival Time 10:30am       Procedure Location: [facility], [place], operated by [facility]     Verified Location with Patient:  Yes  Address provided to the patient     Pre-op H&P Required:  No: Local anesthesia       Post-Op/Follow Up Appt:  Not Indicated in Request      Informed patient they will need a  to drive them home:  Yes    Patients : Spouse     Patient is aware that pre-op RN from the procedure center will call 2-3 days prior to scheduled procedure to confirm arrival time and review any instructions:  Yes       Additional Comments: N/A        Kerry Meyer MA on 6/26/2024 at 4:10 PM      P: [phone]*

## 2024-06-26 NOTE — TELEPHONE ENCOUNTER
Atorvastatin 20 mg     Last Written Prescription Date:  3/20/24  Last Fill Quantity: 90,   # refills: 0  ----------------------  Last Office Visit : 6/20/24  Future Office visit:  no  ----------------------

## 2024-06-27 DIAGNOSIS — F41.9 ANXIETY: ICD-10-CM

## 2024-06-27 NOTE — TELEPHONE ENCOUNTER
RN reviewed patient chart. Pre procedure instructions were sent to the patient.    Daisy Delarosa RNCC

## 2024-07-01 ENCOUNTER — TELEPHONE (OUTPATIENT)
Dept: ANESTHESIOLOGY | Facility: CLINIC | Age: 54
End: 2024-07-01
Payer: COMMERCIAL

## 2024-07-01 NOTE — TELEPHONE ENCOUNTER
Phoned the patient and left VM. Stated procedure time for tomorrow with dr. Eaton has changed to 10:30am. Stated to arrive at 10:30am tomorrow. Send [employer] message as well.

## 2024-07-02 ENCOUNTER — TELEPHONE (OUTPATIENT)
Dept: INTERNAL MEDICINE | Facility: CLINIC | Age: 54
End: 2024-07-02

## 2024-07-02 ENCOUNTER — HOSPITAL ENCOUNTER (OUTPATIENT)
Facility: AMBULATORY SURGERY CENTER | Age: 54
Discharge: HOME | End: 2024-07-02
Payer: COMMERCIAL

## 2024-07-02 ENCOUNTER — ANCILLARY PROCEDURE (OUTPATIENT)
Dept: RADIOLOGY | Facility: AMBULATORY SURGERY CENTER | Age: 54
End: 2024-07-02
Payer: COMMERCIAL

## 2024-07-02 VITALS
OXYGEN SATURATION: 100 % | DIASTOLIC BLOOD PRESSURE: 71 MMHG | TEMPERATURE: 97.8 F | SYSTOLIC BLOOD PRESSURE: 120 MMHG | HEART RATE: 60 BPM | RESPIRATION RATE: 17 BRPM

## 2024-07-02 DIAGNOSIS — G97.1 POST-DURAL PUNCTURE HEADACHE: ICD-10-CM

## 2024-07-02 DIAGNOSIS — M96.1 POSTLAMINECTOMY SYNDROME: ICD-10-CM

## 2024-07-02 PROCEDURE — 62273 INJECT EPIDURAL PATCH: CPT

## 2024-07-02 RX ORDER — IOPAMIDOL 408 MG/ML
INJECTION, SOLUTION INTRATHECAL PRN
Status: DISCONTINUED | OUTPATIENT
Start: 2024-07-02 | End: 2024-07-02 | Stop reason: HOSPADM

## 2024-07-02 RX ORDER — LIDOCAINE HYDROCHLORIDE 10 MG/ML
INJECTION, SOLUTION EPIDURAL; INFILTRATION; INTRACAUDAL; PERINEURAL PRN
Status: DISCONTINUED | OUTPATIENT
Start: 2024-07-02 | End: 2024-07-02 | Stop reason: HOSPADM

## 2024-07-02 NOTE — TELEPHONE ENCOUNTER
Refill request      Medication:   cyclobenzaprine (FLEXERIL) 10 MG tablet    traMADol (ULTRAM) 50 MG tablet    Sig:    Cyclobenzaprine:  TAKE ONE TABLET BY MOUTH THREE TIMES A DAY AS NEEDED FOR MUSCLE SPASMS    Tramadol:  TAKE ONE TABLET BY MOUTH EVERY 6 HOURS AS NEEDED FOR PAIN    Dispensed: Cyclobenzaprine-60 tabs with 0 refills   Tramadol-65 tabs with 0 refills    SOLD to the pt on: Tramadol-6/21/24   Last prescribed to patient:  Cyclobenzaprine-6/18/24     Last clinic appointment: 3/14/24  Next clinic appointment: none noted    Last Drug Screen Collected: 5/25/23  Controlled Substance Agreement signed: 5/25/23      Preferred pharmacy:    [facility] [id number] [place] [address]     Refill request routed to the provider to review.

## 2024-07-02 NOTE — OP NOTE
Epidural Blood Patch    The patient's identity, the procedure to be performed and the specific site of the procedure was verified in accordance with [facility] Protocol.   Diagnosis: Post Puncture Dural Headache  Pre-Procedure Pain Score (0-10 scale): 4/10     Procedure Note:     Informed consent was obtained.  The patient was positioned comfortably in prone position.  The patient was then prepped and draped in a sterile fashion.  There was no evidence of infection at the site of needle insertion.  The skin was then anesthetized with 1% lidocaine.  The epidural needle was inserted and advanced using the loss of resistance technique.  Positive loss of resistance was noted, CSF was not aspirated, blood was not aspirated, paresthesia was not noted. Blood was drawn sterilely from a peripheral site. The patient tolerated the procedure without complications.  The patient was observed for 30 minutes and was then released with post-procedure instructions.  Fluoroscopic guidance was used.    The patient was given discharge instructions and verbalizes understanding, including understanding of those signs and symptoms that would require emergency care.     Post-Procedure Pain Score (0-10 scale):3/10    Level:    L1/2   Needle Type: 20 g touhgeovanna    Total Volume Blood Injected:   15 ml    Counseling: Greater than 50% of this patient visit was spent in counseling the patient regarding the treatment of their pain, coordinating their overall treatment plan and assessing their progress.

## 2024-07-02 NOTE — TELEPHONE ENCOUNTER
[facility] Call Center    Phone Message    May a detailed message be left on voicemail: yes     Reason for Call: Medication Question or concern regarding medication   Prescription Clarification  Name of Medication: semaglutide (OZEMPIC) 2 MG/3ML pen [[id number]]  Prescribing Provider: Ellyn Rivera MD   Pharmacy: [facility] [address]       What on the order needs clarification? Pharmacy is asking for this to be replaced with Wegovy, due to cost,  and sent to   [facility] [address] [place] [address]        Action Taken: Other: pcc    Travel Screening: Not Applicable     Date of Service:

## 2024-07-02 NOTE — DISCHARGE INSTRUCTIONS
[facility]                                        Radiology Discharge instructions Post Blood Patch         AFTER YOU GO HOME    Relax and take it easy for 24 hours  You may resume normal activity tomorrow  You may remove the bandage on your back in the evening or next morning  You may resume bathing the next day  Drink at least 4 glasses of extra fluid today if not on a fluid restriction  DO NOT drive or operate machinery at home or at work for at least 24 hours  If you develop a headache you can take over the counter medicines and lay down.  You can try drinking caffeine-coffee, soda.                   CALL YOU PRIMARY PHYSICIAN IF:  If you start to leak a large amount of fluid from the puncture site, lie down flat on your back.   You develop a severe headache  You develop nausea or vomiting   You develop a temperature of 101 degrees or greater    FOR PAIN CENTER PATIENTS:  If you have questions, please contact the Pain Clinic at [phone] Option #1 between the hours of 7:00 am and 3:00 pm Monday through Friday. After office hours you can contact the on call provider by dialing [phone]. If you need immediate attention, we recommend that you go to a hospital emergency room or dial [phone].

## 2024-07-03 ENCOUNTER — TELEPHONE (OUTPATIENT)
Dept: NEUROPSYCHOLOGY | Facility: CLINIC | Age: 54
End: 2024-07-03
Payer: COMMERCIAL

## 2024-07-03 RX ORDER — CYCLOBENZAPRINE HCL 10 MG
10 TABLET ORAL 3 TIMES DAILY PRN
Qty: 60 TABLET | Refills: 0 | Status: SHIPPED | OUTPATIENT
Start: 2024-07-03 | End: 2024-07-23

## 2024-07-03 RX ORDER — TRAMADOL HYDROCHLORIDE 50 MG/1
50 TABLET ORAL EVERY 6 HOURS PRN
Qty: 65 TABLET | Refills: 0 | Status: SHIPPED | OUTPATIENT
Start: 2024-07-03 | End: 2024-08-02

## 2024-07-03 NOTE — TELEPHONE ENCOUNTER
[facility] Call Center    Phone Message    May a detailed message be left on voicemail: yes     Reason for Call: Pt has been referred for Spinal Cord Stimulator.  I am only able to pull up a schedule for JORGE L Barrera.  JORGE L Frederick is not an option.  No available appointments are coming up at all before the referral expires.  Please call Pt back to get her scheduled.  Thanks.

## 2024-07-05 NOTE — TELEPHONE ENCOUNTER
"Routing to clinic coordinators. Try using the visit type \"Pre-Tx Psych Eval Virtual.\" This isn't a pre-tx eval, but the spinal cord simulator appointments can be scheduled in the same slots as those ones.    Timeline: First Available  [facility]  Preferred Provider: JORGE L Barrera or JORGE L Fredeirck  Visit Type: Pre-Tx Psych Eval Virtual    Thanks!  Caitlin Zhou  Psychometrist    "

## 2024-07-05 NOTE — TELEPHONE ENCOUNTER
Was this denied and we missed it?   Thanks   Shilpa Dillon [facility]  [phone]

## 2024-07-08 RX ORDER — HYDROXYZINE HYDROCHLORIDE 25 MG/1
25 TABLET, FILM COATED ORAL EVERY 6 HOURS PRN
Qty: 180 TABLET | Refills: 1 | Status: SHIPPED | OUTPATIENT
Start: 2024-07-08

## 2024-07-09 ENCOUNTER — MYC MEDICAL ADVICE (OUTPATIENT)
Dept: INTERNAL MEDICINE | Facility: CLINIC | Age: 54
End: 2024-07-09
Payer: COMMERCIAL

## 2024-07-10 NOTE — TELEPHONE ENCOUNTER
"Left Voicemail (1st Attempt) and Sent Mychart (1st Attempt) for the patient to call back and schedule the following:    Routing to clinic coordinators. Try using the visit type \"Pre-Tx Psych Eval Virtual.\" This isn't a pre-tx eval, but the spinal cord simulator appointments can be scheduled in the same slots as those ones.     Timeline: First Available  [facility] [place]  Preferred Provider: JORGE L Barrera or JORGE L Frederick  Visit Type: Pre-Tx Psych Eval Virtual      Jackie Robb on 7/10/2024 at 11:31 AM      "

## 2024-07-15 ENCOUNTER — MYC REFILL (OUTPATIENT)
Dept: INTERNAL MEDICINE | Facility: CLINIC | Age: 54
End: 2024-07-15
Payer: COMMERCIAL

## 2024-07-15 DIAGNOSIS — M96.1 FAILED BACK SURGICAL SYNDROME: ICD-10-CM

## 2024-07-16 ENCOUNTER — TELEPHONE (OUTPATIENT)
Dept: ANESTHESIOLOGY | Facility: CLINIC | Age: 54
End: 2024-07-16
Payer: COMMERCIAL

## 2024-07-16 DIAGNOSIS — M54.16 LUMBAR RADICULOPATHY: Primary | ICD-10-CM

## 2024-07-16 RX ORDER — DIAZEPAM 5 MG
10 TABLET ORAL
OUTPATIENT
Start: 2024-07-16

## 2024-07-17 NOTE — CONFIDENTIAL NOTE
Can we clarify this message?    Does she want wegovy sent to a regular pharmacy instead of compounding pharmacy? Not sure I understand.    If requesting pain meds, she should first check with her pain provider, Dr. Martinez, as he would ideally be the one managing her pain meds. Otherwise, would need an appt to discuss pain medication.    Thanks,  Ellyn Rivera MD  Internal Medicine

## 2024-07-18 ENCOUNTER — TELEPHONE (OUTPATIENT)
Dept: ANESTHESIOLOGY | Facility: CLINIC | Age: 54
End: 2024-07-18
Payer: COMMERCIAL

## 2024-07-18 RX ORDER — HYDROMORPHONE HYDROCHLORIDE 2 MG/1
2 TABLET ORAL 2 TIMES DAILY PRN
Qty: 20 TABLET | Refills: 0 | OUTPATIENT
Start: 2024-07-18

## 2024-07-18 NOTE — TELEPHONE ENCOUNTER
Second attempt: Phoned the patient and left VM. Send [employer] message.

## 2024-07-19 ENCOUNTER — TELEPHONE (OUTPATIENT)
Dept: ANESTHESIOLOGY | Facility: CLINIC | Age: 54
End: 2024-07-19
Payer: COMMERCIAL

## 2024-07-19 NOTE — TELEPHONE ENCOUNTER
Called patient to schedule procedure with Dr. Martinez    Date of Procedure: ina 10/3/24    Arrival time given: Yes: Arrival Time 10:30am       Procedure Location: [facility]     Verified Location with Patient:  Yes  Address provided to the patient     Pre-op H&P Required:  No: Local anesthesia        Post-Op/Follow Up Appt:  Not Indicated in Request      Informed patient they will need a  to drive them home:  Yes    Patients : Spouse     Patient is aware that pre-op RN from the procedure center will call 2-3 days prior to scheduled procedure to confirm arrival time and review any instructions:  Yes       Additional Comments: Patient wants a refill for medication from dr. Martinez. Patient would like a nurse to call.        Kerry Meyer MA on 7/19/2024 at 1:59 PM      P: [phone]*

## 2024-07-22 ENCOUNTER — MYC MEDICAL ADVICE (OUTPATIENT)
Dept: ANESTHESIOLOGY | Facility: CLINIC | Age: 54
End: 2024-07-22
Payer: COMMERCIAL

## 2024-07-22 ENCOUNTER — TELEPHONE (OUTPATIENT)
Dept: ANESTHESIOLOGY | Facility: CLINIC | Age: 54
End: 2024-07-22
Payer: COMMERCIAL

## 2024-07-22 DIAGNOSIS — M96.1 POSTLAMINECTOMY SYNDROME: ICD-10-CM

## 2024-07-22 NOTE — TELEPHONE ENCOUNTER
RN read through the instructions with the patient for the recommended procedure: INJECTION, SPINE, LUMBAR, EPIDURAL (L3-L4)   Patient verbalized understanding to holding appropriate medication per protocol and was agreeable to NPO policy and needing a .    Anticoagulant: None reported.    Patient requested Dr. Martinez to prescribe hydromorphone for a short duration as she has requested previously. Writer informed she last had it prescribed by Dr. Rivera and sold to patient on 6/21. Patient informed provider requested for her to obtain refill from Dr. Martinez. Writer will send an update to the provider.    Daisy Delarosa RNCC

## 2024-07-23 ENCOUNTER — VIRTUAL VISIT (OUTPATIENT)
Dept: PHARMACY | Facility: CLINIC | Age: 54
End: 2024-07-23
Payer: COMMERCIAL

## 2024-07-23 DIAGNOSIS — M96.1 FAILED BACK SURGICAL SYNDROME: ICD-10-CM

## 2024-07-23 DIAGNOSIS — R52 PAIN: ICD-10-CM

## 2024-07-23 DIAGNOSIS — E66.9 OBESITY, UNSPECIFIED CLASSIFICATION, UNSPECIFIED OBESITY TYPE, UNSPECIFIED WHETHER SERIOUS COMORBIDITY PRESENT: Primary | ICD-10-CM

## 2024-07-23 DIAGNOSIS — R73.03 PREDIABETES: ICD-10-CM

## 2024-07-23 PROCEDURE — 99207 PR NO CHARGE LOS: CPT | Mod: 95 | Performed by: PHARMACIST

## 2024-07-23 RX ORDER — CYCLOBENZAPRINE HCL 10 MG
10 TABLET ORAL 3 TIMES DAILY PRN
Qty: 60 TABLET | Refills: 0 | Status: SHIPPED | OUTPATIENT
Start: 2024-07-23 | End: 2024-09-06

## 2024-07-23 RX ORDER — HYDROMORPHONE HYDROCHLORIDE 2 MG/1
2 TABLET ORAL DAILY PRN
Qty: 10 TABLET | Refills: 0 | Status: SHIPPED | OUTPATIENT
Start: 2024-07-23 | End: 2024-08-02

## 2024-07-23 NOTE — TELEPHONE ENCOUNTER
Refill request      Medication: cyclobenzaprine (FLEXERIL) 10 MG tablet    Sig: TAKE ONE TABLET BY MOUTH THREE TIMES A DAY AS NEEDED FOR MUSCLE SPASMS    Dispensed: 60  Refills: 0    Last prescribed to patient: 7/3/24 (Non controlled substance)    Last clinic appointment: 3/14/24  Next clinic appointment: none noted         Preferred pharmacy:    [facility] [address] [place] [address]    Refill request routed to the provider to review.

## 2024-07-23 NOTE — PROGRESS NOTES
Medication Therapy Management (MTM) Encounter    ASSESSMENT:                            Medication Adherence/Access: No issues identified    Weight loss:   Stable.     Back pain:  Messages are out to pain doctor to refill the hydromorphone.     PLAN:                            Continue medications as prescribed  Continue to follow-up with pain clinic for pain concerns.     Follow-up: Return in about 6 months (around 1/23/2025) for Follow up.    SUBJECTIVE/OBJECTIVE:                          Ada Welch is a 53 year old female seen for a follow-up visit.       Reason for visit: Weight loss follow-up.    Allergies/ADRs: Reviewed in chart  Past Medical History: Reviewed in chart  Tobacco: She reports that she quit smoking about 10 years ago. Her smoking use included cigarettes. She started smoking about 33 years ago. She has a 4.6 pack-year smoking history. She has never used smokeless tobacco.  Alcohol: not discussed today    Medication Adherence/Access: no issues reported    Weight Loss/Prediabetes:   Topiramate 100 mg daily   Phentermine 30 mg daily     reports ever since she went on these medications her heart rate hasn't been eratic so she hasn't needed her corlanor.      Patient reports she stopped taking Wegovy because insurance coverage was changed at the beginning of the year.     Most recent weight is 189 lb yesterday         Wt Readings from Last 4 Encounters:   06/25/24 202 lb (91.6 kg)   05/21/24 188 lb (85.3 kg)   05/03/24 192 lb 9.6 oz (87.4 kg)   04/01/24 188 lb 6.4 oz (85.5 kg)     Back pain:  Reports she is suffering from significant back pain and is wondering why she couldn't get another refill of Dilaudid. Discussed that if pain is getting so bad.. Reports she hasn't been able to move much since Saturday.     Today's Vitals: LMP  (LMP Unknown)   ----------------      I spent 30 minutes with this patient today. All changes were made via collaborative practice agreement with Ellyn Rivera MD. A  copy of the visit note was provided to the patient's provider(s).    A summary of these recommendations was sent via [employer].    Kirk Burger, Pharm. D., [facility]  Medication Therapy Management Pharmacist      Telemedicine Visit Details  Type of service:  Video Conference via [employer]  Start Time:  11:30 am  End Time:  12 pm     Medication Therapy Recommendations  BMI 31.0-31.9,adult    Current Medication: phentermine 15 MG capsule (Discontinued)   Rationale: Dose too low - Dosage too low - Effectiveness   Recommendation: Increase Dose   Status: Accepted per Provider

## 2024-07-26 NOTE — MR AVS SNAPSHOT
After Visit Summary   9/26/2017    Ada Welch    MRN: [id number]           Patient Information     Date Of Birth          1970        Visit Information        Provider Department      9/26/2017 12:30 PM Cynthia Leach MD [facility]        Care Instructions    Get a follow up ultrasound through Dr. Rose of your lymph node in your neck.  Dr. Leach is happy to discuss this with you.  Please call our office in December to schedule a time with her for January. Our number to schedule is [phone].    If surgery is planned, call our office and we can discuss with Dr. Leach if she has any recommendations.    To pursue mast cell issues, the person at the  with the most experience is Dr. Stef Doshi.  You can call their clinic to schedule with him at [phone].    Please call our office if we can help you in any way.    It was a pleasure meeting with you today.  Thank you for allowing us the privilege of being involved in your care.  YOU are the reason we are here, and truly hope we provided you with the excellent service you deserve.  Please let us know if there is anything else we can do for you, so that we can be sure you are leaving completely satisfied with your care experience.    The Staff of [facility] - [phone]    Ilda Jensen RN - Nurse Clinician - [place] [facility] - [phone]                  Follow-ups after your visit        Your next 10 appointments already scheduled     Sep 28, 2017  8:30 AM CDT   New Sleep Patient with Bennett Ezra Goltz, PA-C   [facility] ([facility])    [address] [place] [address]   [phone]            Oct 02, 2017  8:00 AM CDT   NM GASTRIC EMPTYING UU UR MG with UUNM2   [facility], [facility] Nuclear Medicine ([facility])    [address]  PT Evaluation     Today's date: 2024  Patient name: Marcellus Fregoso  : 1952  MRN: [id number]  Referring provider: Ehsan Calix MD  Dx:   Encounter Diagnosis     ICD-10-CM    1. Parkinson's disease, unspecified whether dyskinesia present, unspecified whether manifestations fluctuate  G20.A1       2. Parkinson's disease without dyskinesia, with fluctuating manifestations  G20.A2 Ambulatory Referral to Physical Therapy      3. Ambulatory dysfunction  R26.2 Ambulatory Referral to Physical Therapy      4. Abnormality of gait  R26.9                      Assessment    Assessment details: Pt presents to physical therpay at neurology specialty location this date. At this time patient has reached baseline for balance per previous testing although that remains at a high risk of falls per outcomes testing. However, he does have frequent episodes of freezing and festination, has significantly diminished endurance in which his balance, festination and freezing becomes more pronounced. Pt will benfei tfrom skilled therapy intervention to return patient to baseline and transition back to [facility] at 2x/week for 4 weeks.         Subjective Evaluation    History of Present Illness  Mechanism of injury: Pt reports falling seven times in six days shortly after being discharged from physical therapy in mid may. [facility]         Objective    Functional Outcomes 24:  6 mwt: 3 min 20 seconds 400 ft   ABC:   Gait Speed: 0.92 m/s  FGA:  5x sts: 12 sec without UE    Functional Outcomes 4/10/24  6 mwt: 914 ft  ABC: 77%  Gait Speed: 1.06 m/s   FGA:   5x sts: 11.4     Functional outcomes: 3/13/24  ABC: 56%  6 mwt: 650 ft  Gait Speed: 0.88 m/s  FGA: 15/30   5x sts: 12.64 sec          Short Term Goal Expiration Date:(24)  Long Term Goal Expiration Date: (10/26/24)  POC Expiration Date: (10/26/24)      POC expires Unit limit Auth Expiration date PT/OT/ST + Visit Limit?   10/26/24  [facility] [address]   [phone]           Please bring a list of your medicines to the exam. (Include vitamins, minerals and over-the-counter drugs.) You should wear comfortable clothes. Leave your valuables at home. Please bring related prior results and films.  Tell your doctor:   If you are breastfeeding or may be pregnant.   If you have had a barium test within the past few days. Barium may change the results of certain exams.  No food or drink (including water) for 4 hours prior to the exam.  Please call your Imaging Department at your exam site with any questions.            Oct 02, 2017 12:30 PM CDT   (Arrive by 12:15 PM)   Return Visit with FIDELINA Ridley [facility] Neurology [facility])    [address] [place] [address]   [phone]            Oct 04, 2017  8:00 AM CDT   (Arrive by 7:45 AM)   New Patient Visit with Seth Galeano MD   [facility] Neurology ([facility])    [address] [place] [address]   [phone]            Oct 04, 2017  1:00 PM CDT   (Arrive by 12:45 PM)   New Patient Visit with Juana Griffith MD   [facility] ([facility])    [address] [place] [address]   [phone]            Oct 05, 2017  3:30 PM CDT   (Arrive by 3:15 PM)   Implantable Loop Recorder with Uc Cv Device 1   [facility])    [address] [place] [address]   [phone]            Oct 05, 2017  4:00 PM CDT   (Arrive by 3:45 PM)   NEW ARRHYTHMIA with Kirk Russell MD   [facility])    [address] [place] [address]   [phone]            Oct 06, 2017  9:00 AM CDT   XR VIDEO SPEECH EVALUATION WITH ESOPHAGRAM with UCXR2, UC GIGU RAD  bomn bomn bomn                           Visit/Unit Tracking  AUTH Status:  Date 7/26              Used 1              Remaining                     Precautions fall risk       Manuals 7/26                                       Neuro Re-Ed                                                                 Ther Ex                                                                        Ther Activity                        Gait Training                        Modalities                                         [facility] Xray ([facility])    [address]  1st Floor  [facility] [id number] [phone]           Please bring a list of your current medicines to your exam. (Include vitamins, minerals and over-the-counter medicines.) Leave your valuables at home.  Tell the doctor if there is a chance you could be pregnant.  Do not eat for 4 hours before the exam. Keep drinking clear liquids until 2 hours before the exam.  You may take pain medicine (with a sip of water) up to 4 hours before the exam.  Do not swallow any other medicines unless your doctor tells you to. Talk to your doctor to be sure it s safe to stop your medicines.  Please call the Imaging Department at your exam site with any questions.            Oct 06, 2017  9:00 AM CDT   Video Swallow with JAYANT Shaw   [facility] Rehab ([facility])    [address]  4th Floor  [facility] [id number] [phone]           Please check in for this visit in Imaging on the 1st floor.              Who to contact     If you have questions or need follow up information about today's clinic visit or your schedule please contact [facility] directly at [phone].  Normal or non-critical lab and imaging results will be communicated to you by [employer], letter or phone within 4 business days after the clinic has received the results. If you do not hear from us within 7 days, please contact the clinic through [employer] or phone. If you have a critical or abnormal lab result, we will notify you by phone as soon as possible.  Submit refill requests through [employer] or call your pharmacy and they will forward the refill request to us. Please allow 3 business days for your refill to be completed.          Additional Information About Your Visit        [employer] Information     [employer] gives you secure access to your electronic health record. If you see a primary care  "provider, you can also send messages to your care team and make appointments. If you have questions, please call your primary care clinic.  If you do not have a primary care provider, please call [phone] and they will assist you.        Care EveryWhere ID     This is your Care EveryWhere ID. This could be used by other organizations to access your [facility] medical records  [id number]        Your Vitals Were     Pulse Temperature Respirations Height Pulse Oximetry BMI (Body Mass Index)    118 97.7  F (36.5  C) (Oral) 18 1.676 m (5' 5.98\") 99% 32.46 kg/m2       Blood Pressure from Last 3 Encounters:   09/26/17 122/86   09/21/17 133/88   09/12/17 126/88    Weight from Last 3 Encounters:   09/26/17 91.2 kg (201 lb)   09/21/17 91.9 kg (202 lb 8 oz)   09/12/17 92.7 kg (204 lb 5.9 oz)              Today, you had the following     No orders found for display         Today's Medication Changes          These changes are accurate as of: 9/26/17  1:43 PM.  If you have any questions, ask your nurse or doctor.               These medicines have changed or have updated prescriptions.        Dose/Directions    SYNTHROID 25 MCG tablet   This may have changed:  Another medication with the same name was removed. Continue taking this medication, and follow the directions you see here.   Generic drug:  levothyroxine   Changed by:  Cynthia Leach MD        Dose:  25 mcg   Take 25 mcg by mouth daily   Refills:  0                Primary Care Provider Office Phone # Fax #    MizpahDenise Rivera [phone]       [address] [place] [address] [address] [place] [address]        Equal Access to Services     [facility] : herve Resendez, odette garcia. So [facility] [phone].    ATENCIÓN: Si habla español, tiene a sweeney disposición servicios gratuitos de asistencia lingüística. Llame al [phone].    We comply with applicable federal civil rights " laws and [place] laws. We do not discriminate on the basis of race, color, national origin, age, disability sex, sexual orientation or gender identity.            Thank you!     Thank you for choosing [facility]  for your care. Our goal is always to provide you with excellent care. Hearing back from our patients is one way we can continue to improve our services. Please take a few minutes to complete the written survey that you may receive in the mail after your visit with us. Thank you!             Your Updated Medication List - Protect others around you: Learn how to safely use, store and throw away your medicines at www.disposemymeds.org.          This list is accurate as of: 9/26/17  1:43 PM.  Always use your most recent med list.                   Brand Name Dispense Instructions for use Diagnosis    ADVAIR DISKUS 250-50 MCG/DOSE diskus inhaler   Generic drug:  fluticasone-salmeterol      Inhale 1 puff into the lungs 2 times daily as needed        albuterol 108 (90 BASE) MCG/ACT Inhaler    PROAIR HFA/PROVENTIL HFA/VENTOLIN HFA     Inhale 2 puffs into the lungs        amitriptyline 10 MG tablet    ELAVIL    90 tablet    Take 3 tablets (30 mg) by mouth At Bedtime    Chronic migraine without aura without status migrainosus, not intractable       buprenorphine 10 MCG/HR WK patch    BUTRANS    4 patch    Place 1 patch onto the skin every 7 days    Postlaminectomy syndrome of lumbar region       cetirizine 10 MG tablet    zyrTEC     Take 10 mg by mouth daily        COMPRESSION STOCKINGS     3 each    1 each daily 20-30 mmHg Thigh Length measure and fit, color and style per patient preference. Doff n jose per need.    Orthostatic hypotension       EPINEPHrine 0.3 MG/0.3ML injection 2-pack    EPIPEN/ADRENACLICK/or ANY BX GENERIC EQUIV     Inject 0.3 mg into the muscle        etodolac 400 MG tablet    LODINE     Take 400 mg by mouth        famotidine 20 MG tablet    PEPCID    90 tablet    Take 1  tablet (20 mg) by mouth At Bedtime    Hiatal hernia, Mast cell disorder       fluticasone 50 MCG/ACT spray    FLONASE     2 sprays        ketamine (KETALAR) 5%-gabapentin (NEURONTIN) 8%-lidocaine 2.5% 5%-8% Gel topical PLO cream     30 g    Apply 1 g topically 3 times daily as needed    Postlaminectomy syndrome of lumbosacral region, Postlaminectomy syndrome of lumbar region       meloxicam 7.5 MG tablet    MOBIC    15 tablet    Take 1 tablet (7.5 mg) by mouth daily    Chronic migraine without aura without status migrainosus, not intractable       metoclopramide 5 MG tablet    REGLAN    20 tablet    Take 1 tablet (5 mg) by mouth every 6 hours as needed    Intractable chronic migraine without aura and with status migrainosus       MULTIVITAMIN PO      Take by mouth daily        omeprazole 20 MG CR capsule    priLOSEC    90 capsule    Take 1 capsule (20 mg) by mouth daily    Hiatal hernia       ondansetron 4 MG ODT tab    ZOFRAN-ODT    60 tablet    Take 1-2 tablets (4-8 mg) by mouth every 12 hours as needed for nausea    Migraine without status migrainosus, not intractable, unspecified migraine type       polyethylene glycol powder    MIRALAX/GLYCOLAX          PROBIOTIC DAILY PO      Take by mouth 2 times daily        rizatriptan 5 MG ODT tab    MAXALT-MLT    20 tablet    TAKE 1-2 TABLETS (5-10 MG) BY MOUTH AT ONSET OF HEADACHE FOR MIGRAINE (MAX 30 MG IN 24 HOURS)    Migraine without status migrainosus, not intractable, unspecified migraine type       SUMAtriptan 6 MG/0.5ML pen injector kit    IMITREX STATDOSE    2 kit    Inject 0.5 mLs (6 mg) Subcutaneous at onset of headache for migraine (may repeat once after 2 hrs) May repeat in 1 hour. Max 12 mg/24 hours.    Chronic migraine without aura without status migrainosus, not intractable       SYNTHROID 25 MCG tablet   Generic drug:  levothyroxine      Take 25 mcg by mouth daily        tiZANidine 4 MG tablet    ZANAFLEX    90 tablet    Take 2 tablets (8 mg) by mouth 3  times daily as needed for muscle spasms    Post laminectomy syndrome       traMADol 50 MG tablet    ULTRAM    15 tablet    Take 1 tablet (50 mg) by mouth every 6 hours as needed for pain maximum 4 tablet(s) per day    Migraine with aura and without status migrainosus, not intractable

## 2024-07-26 NOTE — PATIENT INSTRUCTIONS
"Recommendations from today's MTM visit:                                                    MTM (medication therapy management) is a service provided by a clinical pharmacist designed to help you get the most of out of your medicines.   Today we reviewed what your medicines are for, how to know if they are working, that your medicines are safe and how to make your medicine regimen as easy as possible.    Continue medications as prescribed  Continue to follow-up with pain clinic for pain concerns.     Follow-up: Return in about 6 months (around 1/23/2025) for Follow up.    It was great speaking with you today.  I value your experience and would be very thankful for your time in providing feedback in our clinic survey. In the next few days, you may receive an email or text message from [employer] with a link to a survey related to your  clinical pharmacist.\"     To schedule another MTM appointment, please call the clinic directly or you may call the MTM scheduling line at [phone] or toll-free at [phone].     My Clinical Pharmacist's contact information:                                                      Please feel free to contact me with any questions or concerns you have.      Kirk Burger, Pharm. D., [facility]  Medication Therapy Management Pharmacist    "

## 2024-08-02 ENCOUNTER — MYC REFILL (OUTPATIENT)
Dept: ANESTHESIOLOGY | Facility: CLINIC | Age: 54
End: 2024-08-02
Payer: COMMERCIAL

## 2024-08-02 ENCOUNTER — MYC REFILL (OUTPATIENT)
Dept: INTERNAL MEDICINE | Facility: CLINIC | Age: 54
End: 2024-08-02
Payer: COMMERCIAL

## 2024-08-02 DIAGNOSIS — M96.1 FAILED BACK SURGICAL SYNDROME: ICD-10-CM

## 2024-08-02 DIAGNOSIS — M96.1 POSTLAMINECTOMY SYNDROME, LUMBAR REGION: ICD-10-CM

## 2024-08-02 DIAGNOSIS — M96.1 POSTLAMINECTOMY SYNDROME: ICD-10-CM

## 2024-08-02 RX ORDER — TRAMADOL HYDROCHLORIDE 50 MG/1
50 TABLET ORAL EVERY 6 HOURS PRN
Qty: 65 TABLET | Refills: 0 | Status: SHIPPED | OUTPATIENT
Start: 2024-08-02 | End: 2024-09-06

## 2024-08-02 NOTE — TELEPHONE ENCOUNTER
Refill request    Message from the patient: The last prescription we did 20. 1 daytime and 1 nighttime. The pain is out of control right now. I can t clean my house, do laundry or cook for my daughter. It s too intense to concentrate to do my finances. Letting the dogs out potty is a mind melt. I need help. I don t see Psych until September to start the process of the electric stim implant. I just need to make it until then.    Medication: HYDROmorphone (DILAUDID) 2 MG tablet    Sig:  Take 1 tablet (2 mg) by mouth daily as needed for pain    Dispensed: 10  Refills: 0  SOLD to the pt on: 7/24/24     Last clinic appointment: 3/14/24  Next clinic appointment: 8/19/24    Last Drug Screen Collected: 5/23/23  Controlled Substance Agreement signed: 5/23/23      Preferred pharmacy:    [place], [place] - [address] [place] [address]    Refill request routed to the provider to review.

## 2024-08-02 NOTE — TELEPHONE ENCOUNTER
Refill request    Medication: traMADol (ULTRAM) 50 MG tablet    Sig: TAKE ONE TABLET BY MOUTH EVERY 6 HOURS AS NEEDED FOR PAIN     Dispensed: 65  Refills: 0    SOLD to the pt on: 7/15/24     Last clinic appointment: 3/14/24  Next clinic appointment: 8/19/24    Last Drug Screen Collected: 5/25/23  Controlled Substance Agreement signed: unknown    Preferred pharmacy:    [facility] [id number] [place] [address]    Refill request routed to the provider to review.

## 2024-08-03 NOTE — TELEPHONE ENCOUNTER
phentermine 30 MG capsule   Disp-30 capsule, R-1,   Start: 05/03/2024       Creatinine   Date Value Ref Range Status   06/23/2024 0.93 0.51 - 0.95 mg/dL Final   11/13/2020 0.83 0.52 - 1.04 mg/dL Final     GFR Estimate  >60 mL/min/1.73m2 74     6/20/2024  [facility] Internal Medicine [place]    Ellyn Rivera MD  Internal Medicine    Routed because:  controlled.

## 2024-08-06 RX ORDER — PHENTERMINE HYDROCHLORIDE 30 MG/1
30 CAPSULE ORAL EVERY MORNING
Qty: 30 CAPSULE | Refills: 1 | Status: SHIPPED | OUTPATIENT
Start: 2024-08-06

## 2024-08-06 RX ORDER — PHENTERMINE HYDROCHLORIDE 30 MG/1
30 CAPSULE ORAL EVERY MORNING
Qty: 30 CAPSULE | Refills: 1 | OUTPATIENT
Start: 2024-08-06

## 2024-08-07 RX ORDER — CELECOXIB 100 MG/1
CAPSULE ORAL
Qty: 60 CAPSULE | Refills: 2 | Status: SHIPPED | OUTPATIENT
Start: 2024-08-07

## 2024-08-07 RX ORDER — SEMAGLUTIDE 0.25 MG/.5ML
INJECTION, SOLUTION SUBCUTANEOUS
Qty: 2 ML | Refills: 0 | OUTPATIENT
Start: 2024-08-07

## 2024-08-07 NOTE — TELEPHONE ENCOUNTER
celecoxib (CELEBREX) 100 MG capsule       Last Written Prescription Date:  4/24/24  Last Fill Quantity: 60,   # refills: 2  Last Office Visit : 6/20/24  Future Office visit:  9/16/24    Refilled per protocol  Sonali JOSE RN  P Central Nursing/Red Flag Triage & Med Refill Team

## 2024-08-07 NOTE — TELEPHONE ENCOUNTER
Ozempic was sent instead of Wegovy. Order attached that is needed. (Wegovy 0.5MG)    Thank you!    Se [place]  Pharmacy Technician  [facility]  [phone]

## 2024-08-08 NOTE — TELEPHONE ENCOUNTER
The RN called and spoke with the patient.    The patient stated that most recently she has been taking Wegovy 0.25 mg Q weekly (last dose yesterday) which had been sent to the [facility] in [place], [place].     Per the patient, if she were to obtain Ozempic through the [facility] this would cost her $200-$250 a month for this prescription (the patient stated that this prescription had been cancelled).     The patient stated that she preferred to be on Wegovy (and not be on Ozempic) given that Wegovy is now free to her/covered by insurance.     The patient stated that she has been tolerating the Wegovy 0.25 mg Q weekly dose without side effects. Additionally, the patient stated that she was comfortable if the dose of Wegovy was increased to 0.5 mg.

## 2024-08-11 DIAGNOSIS — M96.1 POSTLAMINECTOMY SYNDROME: ICD-10-CM

## 2024-08-12 RX ORDER — HYDROMORPHONE HYDROCHLORIDE 2 MG/1
2 TABLET ORAL DAILY PRN
Qty: 10 TABLET | Refills: 0 | Status: SHIPPED | OUTPATIENT
Start: 2024-08-12 | End: 2024-08-19

## 2024-08-12 RX ORDER — BUPRENORPHINE 10 UG/H
1 PATCH TRANSDERMAL WEEKLY
Qty: 4 PATCH | Refills: 1 | Status: SHIPPED | OUTPATIENT
Start: 2024-08-12

## 2024-08-12 NOTE — TELEPHONE ENCOUNTER
Refill request    Medication: buprenorphine (BUTRANS) 10 MCG/HR WK patch    Sig: PLACE 1 PATCH ONTO THE SKIN ONCE A WEEK    Dispensed: 4  Refills: 1  SOLD to the pt on: 7/15/24     Last clinic appointment: 3/14/24  Next clinic appointment: 8/19/24    Last Drug Screen Collected: 5/25/23  Controlled Substance Agreement signed: not on file    Preferred pharmacy:    [facility] [id number] [place] [address]    Refill request routed to the provider to review.

## 2024-08-14 ENCOUNTER — TELEPHONE (OUTPATIENT)
Dept: ANESTHESIOLOGY | Facility: CLINIC | Age: 54
End: 2024-08-14
Payer: COMMERCIAL

## 2024-08-14 NOTE — TELEPHONE ENCOUNTER
[facility] Call Center    Phone Message    May a detailed message be left on voicemail: yes     Reason for Call: Other: Patient calling back to speak with Daisy.  Please try her back. She will be by her phone.     Action Taken: Message routed to:  Other: Pain Clinic    Travel Screening: Not Applicable     Date of Service:

## 2024-08-14 NOTE — TELEPHONE ENCOUNTER
RN spoke with patient to answer procedure questions. Patient was in distress expressing concern with having increased pain from the procedure tomorrow as she has her daughter bridal shower on Sunday. She informed Dr. Martinez previously prescribed dilaudid 1 tablet in AM and 1 tablet in PM, but has since changed prescription to once daily. Writer advised patient to keep procedure as patient expressed she does obtain benefit. Writer advised patient to take a dilaudid on Sunday if she is having increased pain, patient understood and agreed. She reports having her Pain psych eval in September. Writer noted patient has a follow up visit on Monday and advised for her to discuss with Dr. Martinez her concerns, patient agreed. Writer addressed all questions and patient was less distressed and more comfortable with the plan to proceed with the injection and discuss with Dr. Martinez in regard to medication on Monday.    Daisy Delarosa RNCC

## 2024-08-14 NOTE — TELEPHONE ENCOUNTER
RN left voicemail advising patient to return call to discuss her questions in regard to the procedure scheduled for tomorrow. Left call back number.    Daisy Delarosa RNCC

## 2024-08-14 NOTE — TELEPHONE ENCOUNTER
Patient is scheduled with dr. Martinez on 8/15/24. Patient has few questions regarding the procedure. Patient would like a nurse to call.

## 2024-08-19 ENCOUNTER — OFFICE VISIT (OUTPATIENT)
Dept: ANESTHESIOLOGY | Facility: CLINIC | Age: 54
End: 2024-08-19
Payer: COMMERCIAL

## 2024-08-19 VITALS
SYSTOLIC BLOOD PRESSURE: 108 MMHG | DIASTOLIC BLOOD PRESSURE: 77 MMHG | OXYGEN SATURATION: 98 % | HEART RATE: 81 BPM | RESPIRATION RATE: 16 BRPM

## 2024-08-19 DIAGNOSIS — Q79.60 EHLERS-DANLOS SYNDROME: ICD-10-CM

## 2024-08-19 DIAGNOSIS — M96.1 POSTLAMINECTOMY SYNDROME: Primary | ICD-10-CM

## 2024-08-19 DIAGNOSIS — M96.1 FAILED BACK SURGICAL SYNDROME: ICD-10-CM

## 2024-08-19 PROCEDURE — 99214 OFFICE O/P EST MOD 30 MIN: CPT | Mod: GC | Performed by: ANESTHESIOLOGY

## 2024-08-19 RX ORDER — METHOCARBAMOL 500 MG/1
500 TABLET, FILM COATED ORAL 4 TIMES DAILY
Qty: 30 TABLET | Refills: 0 | Status: SHIPPED | OUTPATIENT
Start: 2024-08-19 | End: 2024-09-10

## 2024-08-19 RX ORDER — HYDROMORPHONE HYDROCHLORIDE 2 MG/1
2 TABLET ORAL 2 TIMES DAILY
Qty: 15 TABLET | Refills: 0 | Status: SHIPPED | OUTPATIENT
Start: 2024-08-19

## 2024-08-19 ASSESSMENT — PAIN SCALES - GENERAL: PAINLEVEL: EXTREME PAIN (8)

## 2024-08-19 ASSESSMENT — PAIN SCALES - PAIN ENJOYMENT GENERAL ACTIVITY SCALE (PEG)
AVG_PAIN_PASTWEEK: 8
PEG_TOTALSCORE: 8
INTERFERED_ENJOYMENT_LIFE: 8
INTERFERED_GENERAL_ACTIVITY: 8

## 2024-08-19 NOTE — LETTER
8/19/2024       RE: Ada Welch  [address] [facility] [address]     Dear Colleague,    Thank you for referring your patient, Ada Welch, to the [facility] [place] at [facility]. Please see a copy of my visit note below.    [facility] : Progress Note    Date of visit: 8/19/2024    Chief Complaint   Patient presents with     Pain     RECHECK     Changes in pain.  Has been increasing over the past 4 months.  Numbness from face throughout body.  First time in seven years that nothing has been helping the pain.     Interval history:    Ada Welch is a 53 year old female is known to me for multifocal chronic pain secondary to EDS and failed back surgery syndrome. The patient has  complex history of low back pain for ~32 years for which she underwent L4-S1 fusion (TLIF) 12/9/13.  Postoperatively, her back pain worsened (mainly LBP, but also radiated down both lower limbs in an S1 dermatomal distribution).   Underwent removal of posterior instrumentation (screws and guadalupe).  After fusion removal, her pain continued.  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  She even sought care from a EDS specialist in [place].       She has a h/o significant autonomic dysfunction.  Since 2014, she reports that her heart rate has fluctuated from as low as  beats per minutes and her blood pressure also fluctuates at the same time without any aggravating factors.  The fluctuation of the blood pressure and heart rate happens when she is supine, sitting, standing, walking, etc.  She has seen cardiologist Dr. Russell who advised her to continue isometric exercise, low carbohydrate and low gluten diet.  Her blood pressure has been stable now.     She saw Dr. Singh for urinary urgency and incontinence.  She is advised for improving  lifestyle, dietary modification, optimizing bowel regimen and to start pelvic physical therapy.     She saw Dr. Moncada for left upper extremity radicular pain along the C8 nerve root distribution.  Per Dr. Moncaad's note, she does not have a surgical target which may explain her symptoms.  She still continues to report this symptom.  She had cervical medial branch nerve block x2 with us 50% pain relief.   However medial branch nerve radiofrequency ablation was not approved by the insurance.  She had 2 cervical epidural steroid injections with moderate pain relief.      The patient saw Dr. Mack for eosinophilic esophagitis, GERD, and dysphagia.    She had open hernia surgery by Dr. Mack in February 2020.  Her postoperative course was uneventful.        The patient saw  Dr. Noemy Nguyen for chronic migraines. Patient reports that her chronic intractable headache pain recently got worse. 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  She had multiple blood patches intermittently for her headache pain.     Recently saw Dr. Enciso for lower back pain secondary to persistent spinal pain after L4-S1 fusion.    12/09/2013 - L4-S1 fusion, TLIF (Dr. Williamson in [place], [place])   2014 - removal of posterior instrumentation (screws and guadalupe) with exploration of fusion (Dr. MIKE Lucero ([facility]  She was recommended  for nonoperative treatment including epidural steroid injection,  medial branch nerve block/radiofrequency ablation of the lumbar MBB, and/or SI joint injections.  If these injections are proven to be ineffective in the past and didn't provide long term relief.  She then saw Dr. Burks who also recommended against surgery.     Currently she has been  managing her pain with stable dose of Butrans 10 mcg per hour q 7 days, tramadol 50 mg twice daily.  In addition she takes tizanidine 4 mg 4 times daily as needed.    She takes sumatriptan for breakthrough migraine.  Occasionally she takes small doses of  oxycodone/Dilaudid and bilateral piriformis muscle injection with good pain relief when her pain gets significantly worse.      Today her main complaints are following:  - Overall feels her pain is not optimized. Feels like she's been hit by a bus.   - Struggled this summer. Tough to take care of herself and her home.   - Radiating all over her body. Overall hasn't changed in total character.   - Feels numb from face down into her whole body. Feels like her brain is being pulled down, constant fog.   - She says she missed her appointment on 8/15 for an SUNSHINE because she took a dilaudid in the morning due to pain and fell asleep.   - She saw Dr. Cecile ARCINIEGA. She's very interested in that. She has a pain psychology appointment scheduled for 9/06/2024. She would also be interested in a pain pump.   - She had an epidural blood patch on 7/2/24 with Dr. Eaton. She thinks it helped with her overall headache presentation.   - The Dilaudid recently given was helpful but when only given one per day, she feels like she has to choose between sleep and daily duties. She keeps her tramadol as a back up.  - She continues with her Butrans. She's wondering if it can be increased when she's in a flare.   - Continues to use celecoxib, lidocaine patches intermittently, medical cannabis daily (balm, gummies, ingestible). Not using methocarbamol, doesn't think it worked. Using her TENS unit. Does lots of herbal treatments.   - Continues to follow with Neurology for migraine management.   - Tonight she is leaving for a 9 day wedding trip. She's hoping for additional pain medications to help with this trip.       [place] Prescription Monitoring Program:   Reviewed. No concerns     Review of Systems:  The 14 system ROS was reviewed and was negative except what is documented above and as follows.  Any bowel or bladder problems: none  Mood: poor    Physical Exam:  Vitals:    08/19/24 0838   BP: 108/77   Pulse: 81   Resp: 16   SpO2: 98%        General: Awake in no apparent distress.   Eyes: Sclerae are anicteric. EOMI   Lungs: unlabored.   Heart: warm and well perfused  Abdomen: not distended  Extremities: Pulses are well palpable, no peripheral edema.   Musculoskeletal: Greater than anti gravity strength in all extremities  Neurologic exam: Sensation intact throughout all dermatomes bilateral upper extremities and lower extremities  Psychiatric; Tearful affect.   Skin: Warm and Dry.    Medications:  Current Outpatient Medications   Medication Sig Dispense Refill     acetaminophen (TYLENOL) 500 MG tablet Take 1,000 mg by mouth every 6 hours as needed for mild pain       atorvastatin (LIPITOR) 20 MG tablet TAKE ONE TABLET BY MOUTH ONCE DAILY 90 tablet 3     buprenorphine (BUTRANS) 10 MCG/HR WK patch Place 1 patch onto the skin once a week 4 patch 1     butalbital-acetaminophen-caffeine (ESGIC) -40 MG tablet Take 1 tablet by mouth every 6 hours as needed for headaches 10 tablet 0     celecoxib (CELEBREX) 100 MG capsule TAKE ONE CAPSULE BY MOUTH TWICE A DAY AS NEEDED FOR MODERATE PAIN. TAKE WITH FOOD 60 capsule 2     cetirizine (ZYRTEC) 10 MG tablet Take 10 mg by mouth daily as needed        co-enzyme Q-10 100 MG CAPS capsule Take 100 mg by mouth daily       cyclobenzaprine (FLEXERIL) 10 MG tablet TAKE ONE TABLET BY MOUTH THREE TIMES A DAY AS NEEDED FOR MUSCLE SPASMS 60 tablet 0     DULoxetine (CYMBALTA) 30 MG capsule TAKE 2 CAPSULES BY MOUTH IN THE MORNING AND 1 CAPSULE BY MOUTH AT BEDTIME 270 capsule 1     EPINEPHrine (ANY BX GENERIC EQUIV) 0.3 MG/0.3ML injection 2-pack Inject 0.3 mLs (0.3 mg) into the muscle as needed for anaphylaxis May repeat one time in 5-15 minutes if response to initial dose is inadequate. 2 each 1     ferrous fumarate 65 mg, [place]. FE,-Vitamin C 125 mg (VITRON-C)  MG TABS tablet Take 1 tablet by mouth every other day 60 tablet 4     fluticasone (FLONASE) 50 MCG/ACT nasal spray Spray 2 sprays into both nostrils daily  Call clinic to schedule follow up appointment. 48 mL 11     guanFACINE (TENEX) 1 MG tablet TAKE ONE TAB BY MOUTH ONCE NIGHTLY AT BEDTIME       HYDROmorphone (DILAUDID) 2 MG tablet Take 1 tablet (2 mg) by mouth daily as needed for pain 10 tablet 0     hydrOXYzine HCl (ATARAX) 25 MG tablet TAKE 1 TABLET (25 MG) BY MOUTH EVERY 6 HOURS AS NEEDED FOR ANXIETY 180 tablet 1     ibuprofen ([employer] IBUPROFEN) 200 MG capsule Take 600 mg by mouth every 4 hours as needed for fever       ivabradine (CORLANOR) 7.5 MG tablet Take 1 tablet (7.5 mg) by mouth 2 times daily 180 tablet 3     Lidocaine (LIDOCARE) 4 % Patch Place 1-3 patches onto the skin every 24 hours To prevent lidocaine toxicity, patient should be patch free for 12 hrs daily. 20 patch 0     medical cannabis (Patient's own supply.  Not a prescription) Take 1 Dose by mouth See Admin Instructions Capsule formulation - uses as needed       metoclopramide (REGLAN) 10 MG tablet Take 1 tablet (10 mg) by mouth 4 times daily as needed (headache) 40 tablet 3     Multiple Vitamins-Minerals (MULTIVITAMIN PO) Take 1 tablet by mouth daily        naloxone (NARCAN) 4 MG/0.1ML nasal spray Spray 1 spray (4 mg) into one nostril alternating nostrils once as needed for opioid reversal every 2-3 minutes until assistance arrives 0.2 mL 0     phentermine 30 MG capsule TAKE ONE CAPSULE BY MOUTH EVERY MORNING 30 capsule 1     Probiotic Product (PROBIOTIC DAILY PO) Take 6 packets by mouth every morning Five-Lac       propranolol ER (INDERAL LA) 120 MG 24 hr capsule Take 1 capsule (120 mg) by mouth daily 90 capsule 3     ramelteon (ROZEREM) 8 MG tablet TAKE 1 TABLET (8 MG) BY MOUTH AT BEDTIME 30 tablet 3     rizatriptan (MAXALT-MLT) 5 MG ODT TAKE 1-2 TABLETS (5-10 MG) BY MOUTH AT ONSET OF HEADACHE FOR MIGRAINE (MAX 30 MG IN 24 HOURS) 18 tablet 11     Semaglutide-Weight Management (WEGOVY) 0.5 MG/0.5ML pen Inject 0.5 mg subcutaneously once a week 2 mL 0     SUMAtriptan (IMITREX STATDOSE) 6 MG/0.5ML  pen injector kit Inject 0.5 ml (6 mg) subcutaneously at onset of migraine, May repeat in 1 hour , Max 12 mg/24 hrs 2 kit 11     topiramate (TOPAMAX) 100 MG tablet Take 1 tablet (100 mg) by mouth daily 90 tablet 3     traMADol (ULTRAM) 50 MG tablet TAKE ONE TABLET BY MOUTH EVERY 6 HOURS AS NEEDED FOR PAIN 65 tablet 0     vitamin B complex with vitamin C (STRESS TAB) tablet Take 1 tablet by mouth daily       VITAMIN D, CHOLECALCIFEROL, PO Take 2,000 Units by mouth daily               Admission on 06/23/2024, Discharged on 06/23/2024   Component Date Value Ref Range Status     Sodium 06/23/2024 137  135 - 145 mmol/L Final    Reference intervals for this test were updated on 09/26/2023 to more accurately reflect our healthy population. There may be differences in the flagging of prior results with similar values performed with this method. Interpretation of those prior results can be made in the context of the updated reference intervals.      Potassium 06/23/2024 4.0  3.4 - 5.3 mmol/L Final     Carbon Dioxide (CO2) 06/23/2024 23  22 - 29 mmol/L Final     Anion Gap 06/23/2024 8  7 - 15 mmol/L Final     Urea Nitrogen 06/23/2024 14.3  6.0 - 20.0 mg/dL Final     Creatinine 06/23/2024 0.93  0.51 - 0.95 mg/dL Final     GFR Estimate 06/23/2024 74  >60 mL/min/1.73m2 Final    eGFR calculated using 2021 CKD-EPI equation.     Calcium 06/23/2024 8.6  8.6 - 10.0 mg/dL Final     Chloride 06/23/2024 106  98 - 107 mmol/L Final     Glucose 06/23/2024 88  70 - 99 mg/dL Final     Alkaline Phosphatase 06/23/2024 102  40 - 150 U/L Final     AST 06/23/2024 57 (H)  0 - 45 U/L Final    Reference intervals for this test were updated on 6/12/2023 to more accurately reflect our healthy population. There may be differences in the flagging of prior results with similar values performed with this method. Interpretation of those prior results can be made in the context of the updated reference intervals.     ALT 06/23/2024 56 (H)  0 - 50 U/L Final     Reference intervals for this test were updated on 6/12/2023 to more accurately reflect our healthy population. There may be differences in the flagging of prior results with similar values performed with this method. Interpretation of those prior results can be made in the context of the updated reference intervals.       Protein Total 06/23/2024 6.1 (L)  6.4 - 8.3 g/dL Final     Albumin 06/23/2024 4.1  3.5 - 5.2 g/dL Final     Bilirubin Total 06/23/2024 0.7  <=1.2 mg/dL Final     Magnesium 06/23/2024 2.4 (H)  1.7 - 2.3 mg/dL Final     CRP Inflammation 06/23/2024 <3.00  <5.00 mg/L Final     WBC Count 06/23/2024 7.1  4.0 - 11.0 10e3/uL Final     RBC Count 06/23/2024 4.08  3.80 - 5.20 10e6/uL Final     Hemoglobin 06/23/2024 12.7  11.7 - 15.7 g/dL Final     Hematocrit 06/23/2024 38.0  35.0 - 47.0 % Final     MCV 06/23/2024 93  78 - 100 fL Final     MCH 06/23/2024 31.1  26.5 - 33.0 pg Final     MCHC 06/23/2024 33.4  31.5 - 36.5 g/dL Final     RDW 06/23/2024 12.9  10.0 - 15.0 % Final     Platelet Count 06/23/2024 218  150 - 450 10e3/uL Final     % Neutrophils 06/23/2024 52  % Final     % Lymphocytes 06/23/2024 37  % Final     % Monocytes 06/23/2024 8  % Final     % Eosinophils 06/23/2024 2  % Final     % Basophils 06/23/2024 1  % Final     % Immature Granulocytes 06/23/2024 0  % Final     NRBCs per 100 WBC 06/23/2024 0  <1 /100 Final     Absolute Neutrophils 06/23/2024 3.7  1.6 - 8.3 10e3/uL Final     Absolute Lymphocytes 06/23/2024 2.7  0.8 - 5.3 10e3/uL Final     Absolute Monocytes 06/23/2024 0.5  0.0 - 1.3 10e3/uL Final     Absolute Eosinophils 06/23/2024 0.2  0.0 - 0.7 10e3/uL Final     Absolute Basophils 06/23/2024 0.1  0.0 - 0.2 10e3/uL Final     Absolute Immature Granulocytes 06/23/2024 0.0  <=0.4 10e3/uL Final     Absolute NRBCs 06/23/2024 0.0  10e3/uL Final       ASSESSMENT:       Diagnoses         Codes Comments    Postlaminectomy syndrome, lumbar region    -  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     Spondylolisthesis of lumbar  region     M43.16     Facet arthropathy, cervical     M47.812     Chronic sacroiliac joint pain     M53.3, G89.29     Lumbar radiculopathy, chronic     M54.16     Piriformis syndrome of both sides     G57.03     S/P lumbar fusion     Z98.1     Opioid type dependence, continuous (H)     F11.20     POTS (postural orthostatic tachycardia syndrome)     G90.A           PLAN:     Continue Butrans 10 mics per hour.  4 patches per month  Tramadol 50 mg daily as needed.  65 tablets dispensed.  Dilaudid 1 mg twice daily as needed for upcoming trip.   Recommend over-the-counter lidocaine 4% patches for the most painful spots on lower back every 12 hours  Continue using medical cannabis  Continue methocarbamol 500 mg 4 times daily as needed.    Sumatriptan/rizatriptan for migraine headache as needed.   Patient had previously stopped amitriptyline due to weight gain  Lumbar L3/L4 SUNSHINE and bilateral piriformis (recommended by Dr. Enciso) PRN.  Repeat L3/L4 SUNSHINE scheduled for 9/06/2024  She is following Dr. Huber about possible SCS. She has an appointment with neuropsychology on 9/11/2024 for SCS evaluation. She plans to follow up with Dr. Huber following this appointment.   New referral for acupuncture   New referral for physical therapy     Assessment will be ongoing with changes in treatment as indicated.  Benefits/risks/alternatives to treatment have been reviewed and the patient has been instructed to contact this office if they have any questions or concerns.  This plan of care has been discussed with the patient and the patient is in agreement.     Patient was discussed with attending physician, Dr. Martinez.     Adolfo Damian DO  Pain Fellow  [facility]      Attestation signed by Faiza Martinez MD at 8/19/2024 12:00 PM:  Physician Attestation  I saw and evaluated Ada Welch.  I agree with above history, review of systems, physical exam and plan. I have reviewed the content of the documentation and have  edited it as needed. I have personally performed the services documented here and the documentation accurately represents those services and the decisions I have made.     In today's visit, I gave her physical therapy referral to [facility].  Both aquatic therapy and land-based therapy.  In addition referred for acupuncture therapy.  Dilaudid 2 mg twice daily as needed.  15 tablets dispensed.      Faiza Martinez MD, PhD    [facility] [address]  5TH FLOOR  [facility] [id number] [phone]  Dept: [phone]                Again, thank you for allowing me to participate in the care of your patient.      Sincerely,    Faiza Martinez MD

## 2024-08-19 NOTE — LETTER
Opioid / Opioid Plus Controlled Substance Agreement    This is an agreement between you and your provider about the safe and appropriate use of controlled substance/opioids prescribed by your care team. Controlled substances are medicines that can cause physical and mental dependence (abuse).    There are strict laws about having and using these medicines. We here at [facility] are committing to working with you in your efforts to get better. To support you in this work, we ll help you schedule regular office appointments for medicine refills. If we must cancel or change your appointment for any reason, we ll make sure you have enough medicine to last until your next appointment.     As a Provider, I will:  Listen carefully to your concerns and treat you with respect.   Recommend a treatment plan that I believe is in your best interest. This plan may involve therapies other than opioid pain medication.   Talk with you often about the possible benefits, and the risk of harm of any medicine that we prescribe for you.   Provide a plan on how to taper (discontinue or go off) using this medicine if the decision is made to stop its use.    As a Patient, I understand that opioid(s):   Are a controlled substance prescribed by my care team to help me function or work and manage my condition(s).   Are strong medicines and can cause serious side effects such as:  Drowsiness, which can seriously affect my driving ability  A lower breathing rate, enough to cause death  Harm to my thinking ability   Depression   Abuse of and addiction to this medicine  Need to be taken exactly as prescribed. Combining opioids with certain medicines or chemicals (such as illegal drugs, sedatives, sleeping pills, and benzodiazepines) can be dangerous or even fatal. If I stop opioids suddenly, I may have severe withdrawal symptoms.  Do not work for all types of pain nor for all patients. If they re not helpful, I may be asked to stop  them.                      The risks, benefits and side effects of these medicine(s) were explained to me. I agree that:  I will take part in other treatments as advised by my care team. This may be psychiatry or counseling, physical therapy, behavioral therapy, group treatment or a referral to a specialist.     I will keep all my appointments. I understand that this is part of the monitoring of opioids. My care team may require an office visit for EVERY opioid/controlled substance refill. If I miss appointments or don t follow instructions, my care team may stop my medicine.    I will take my medicines as prescribed. I will not change the dose or schedule unless my care team tells me to. There will be no refills if I run out early.     I may be asked to come to the clinic and complete a urine drug test or complete a pill count at any time. If I don t give a urine sample or participate in a pill count, the care team may stop my medicine.    I will only receive prescriptions from this clinic for chronic pain. If I am treated by another provider for acute pain issues, I will tell them that I am taking opioid pain medication for chronic pain and that I have a treatment agreement with this provider. I will inform my [facility] care team within one business day if I am given a prescription for any pain medication by another healthcare provider. My [facility] care team can contact other providers and pharmacists about my use of any medicines.    It is up to me to make sure that I don t run out of my medicines on weekends or holidays. If my care team is willing to refill my opioid prescription without a visit, I must request refills only during office hours. Refills may take up to 7 business days to process. I will use one pharmacy to fill all my opioid and other controlled substance prescriptions. I will notify the clinic about any changes to my insurance or medication availability.    I am responsible for  my prescriptions. If the medicine/prescription is lost, stolen or destroyed, it will not be replaced. I also agree not to share controlled substance medicines with anyone.    I am aware I should not use any illegal or recreational drugs. I agree not to drink alcohol unless my care team says I can.       If I enroll in the [place] [facility] program, I will tell my care team prior to my next refill.     I will tell my care team right away if I become pregnant, have a new medical problem treated outside of my regular clinic, or have a change in my medications.    I understand that this medicine can affect my thinking, judgment and reaction time. Alcohol and drugs affect the brain and body, which can affect the safety of my driving. Being under the influence of alcohol or drugs can affect my decision-making, behaviors, personal safety, and the safety of others. Driving while impaired (DWI) can occur if a person is driving, operating, or in physical control of a car, motorcycle, boat, snowmobile, ATV, motorbike, off-road vehicle, or any other motor vehicle ([place] Statute 169A.20). I understand the risk if I choose to drive or operate any vehicle or machinery.    I understand that if I do not follow any of the conditions above, my prescriptions or treatment may be stopped or changed.          Opioids  What You Need to Know    What are opioids?   Opioids are pain medicines that must be prescribed by a doctor. They are also known as narcotics.     Examples are:   morphine (MS Contin, Florencia)  oxycodone (Oxycontin)  oxycodone and acetaminophen (Percocet)  hydrocodone and acetaminophen (Vicodin, Norco)   fentanyl patch (Duragesic)   hydromorphone (Dilaudid)   methadone  codeine (Tylenol #3)     What do opioids do well?   Opioids are best for severe short-term pain such as after a surgery or injury. They may work well for cancer pain. They may help some people with long-lasting (chronic) pain.     What do opioids NOT do  well?   Opioids never get rid of pain entirely, and they don t work well for most patients with chronic pain. Opioids don t reduce swelling, one of the causes of pain.                                    Other ways to manage chronic pain and improve function include:     Treat the health problem that may be causing pain  Anti-inflammation medicines, which reduce swelling and tenderness, such as ibuprofen (Advil, Motrin) or naproxen (Aleve)  Acetaminophen (Tylenol)  Antidepressants and anti-seizure medicines, especially for nerve pain  Topical treatments such as patches or creams  Injections or nerve blocks  Chiropractic or osteopathic treatment  Acupuncture, massage, deep breathing, meditation, visual imagery, aromatherapy  Use heat or ice at the pain site  Physical therapy   Exercise  Stop smoking  Take part in therapy       Risks and side effects     Talk to your doctor before you start or decide to keep taking opioids. Possible side effects include:    Lowering your breathing rate enough to cause death  Overdose, including death, especially if taking higher than prescribed doses  Worse depression symptoms; less pleasure in things you usually enjoy  Feeling tired or sluggish  Slower thoughts or cloudy thinking  Being more sensitive to pain over time; pain is harder to control  Trouble sleeping or restless sleep  Changes in hormone levels (for example, less testosterone)  Changes in sex drive or ability to have sex  Constipation  Unsafe driving  Itching and sweating  Dizziness  Nausea, throwing up and dry mouth    What else should I know about opioids?    Opioids may lead to dependence, tolerance, or addiction.    Dependence means that if you stop or reduce the medicine too quickly, you will have withdrawal symptoms. These include loose poop (diarrhea), jitters, flu-like symptoms, nervousness and tremors. Dependence is not the same as addiction.                     Tolerance means needing higher doses over time to  get the same effect. This may increase the chance of serious side effects.    Addiction is when people improperly use a substance that harms their body, their mind or their relations with others. Use of opiates can cause a relapse of addiction if you have a history of drug or alcohol abuse.    People who have used opioids for a long time may have a lower quality of life, worse depression, higher levels of pain and more visits to doctors.    You can overdose on opioids. Take these steps to lower your risk of overdose:    Recognize the signs:  Signs of overdose include decrease or loss of consciousness (blackout), slowed breathing, trouble waking up and blue lips. If someone is worried about overdose, they should call 911.    Talk to your doctor about Narcan (naloxone).   If you are at risk for overdose, you may be given a prescription for Narcan. This medicine very quickly reverses the effects of opioids.   If you overdose, a friend or family member can give you Narcan while waiting for the ambulance. They need to know the signs of overdose and how to give Narcan.     Don't use alcohol or street drugs.   Taking them with opioids can cause death.    Do not take any of these medicines unless your doctor says it s OK. Taking these with opioids can cause death:  Benzodiazepines, such as lorazepam (Ativan), alprazolam (Xanax) or diazepam (Valium)  Muscle relaxers, such as cyclobenzaprine (Flexeril)  Sleeping pills like zolpidem (Ambien)   Other opioids      How to keep you and other people safe while taking opioids:    Never share your opioids with others.  Opioid medicines are regulated by the Drug Enforcement Agency ([place]). Selling or sharing medications is a criminal act.    2. Be sure to store opioids in a secure place, locked up if possible. Young children can easily swallow them and overdose.    3. When you are traveling with your medicines, keep them in the original bottles. If you use a pill box, be sure you also  carry a copy of your medicine list from your clinic or pharmacy.    4. Safe disposal of opioids    Most pharmacies have places to get rid of medicine, called disposal kiosks. Medicine disposal options are also available in every [facility]. Search your county and  medication disposal  to find more options. You can find more details at:  https://[email or web address]-green/managing-unwanted-medications     I agree that my provider, clinic care team, and pharmacy may work with any city, state or federal law enforcement agency that investigates the misuse, sale, or other diversion of my controlled medicine. I will allow my provider to discuss my care with, or share a copy of, this agreement with any other treating provider, pharmacy or emergency room where I receive care.    I have read this agreement and have asked questions about anything I did not understand.    _______________________________________________________  Patient Signature - Ada Welch _____________________                   Date     _______________________________________________________  Provider Signature - Faiza Martinez MD   _____________________                   Date     _______________________________________________________  Witness Signature (required if provider not present while patient signing)   _____________________                   Date

## 2024-08-19 NOTE — PROGRESS NOTES
[facility] : Progress Note    Date of visit: 8/19/2024    Chief Complaint   Patient presents with    Pain    RECHECK     Changes in pain.  Has been increasing over the past 4 months.  Numbness from face throughout body.  First time in seven years that nothing has been helping the pain.     Interval history:    Ada Welch is a 53 year old female is known to me for multifocal chronic pain secondary to EDS and failed back surgery syndrome. The patient has  complex history of low back pain for ~32 years for which she underwent L4-S1 fusion (TLIF) 12/9/13.  Postoperatively, her back pain worsened (mainly LBP, but also radiated down both lower limbs in an S1 dermatomal distribution).   Underwent removal of posterior instrumentation (screws and guadalupe).  After fusion removal, her pain continued.  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  She even sought care from a EDS specialist in [place].       She has a h/o significant autonomic dysfunction.  Since 2014, she reports that her heart rate has fluctuated from as low as  beats per minutes and her blood pressure also fluctuates at the same time without any aggravating factors.  The fluctuation of the blood pressure and heart rate happens when she is supine, sitting, standing, walking, etc.  She has seen cardiologist Dr. Russell who advised her to continue isometric exercise, low carbohydrate and low gluten diet.  Her blood pressure has been stable now.     She saw Dr. Singh for urinary urgency and incontinence.  She is advised for improving lifestyle, dietary modification, optimizing bowel regimen and to start pelvic physical therapy.     She saw Dr. Moncada for left upper extremity radicular pain along the C8 nerve root distribution.  Per Dr. Moncada's note, she does not have a surgical target which may explain her symptoms.  She still continues to report this symptom.  She had cervical  medial branch nerve block x2 with us 50% pain relief.   However medial branch nerve radiofrequency ablation was not approved by the insurance.  She had 2 cervical epidural steroid injections with moderate pain relief.      The patient saw Dr. Mack for eosinophilic esophagitis, GERD, and dysphagia.    She had open hernia surgery by Dr. Mack in February 2020.  Her postoperative course was uneventful.        The patient saw  Dr. Noemy Nguyen for chronic migraines. Patient reports that her chronic intractable headache pain recently got worse. 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  She had multiple blood patches intermittently for her headache pain.     Recently saw Dr. Enciso for lower back pain secondary to persistent spinal pain after L4-S1 fusion.    12/09/2013 - L4-S1 fusion, TLIF (Dr. Williamson in [place], [place])   2014 - removal of posterior instrumentation (screws and guadalupe) with exploration of fusion (Dr. MIKE Lucero ([facility]  She was recommended  for nonoperative treatment including epidural steroid injection,  medial branch nerve block/radiofrequency ablation of the lumbar MBB, and/or SI joint injections.  If these injections are proven to be ineffective in the past and didn't provide long term relief.  She then saw Dr. Burks who also recommended against surgery.     Currently she has been  managing her pain with stable dose of Butrans 10 mcg per hour q 7 days, tramadol 50 mg twice daily.  In addition she takes tizanidine 4 mg 4 times daily as needed.    She takes sumatriptan for breakthrough migraine.  Occasionally she takes small doses of oxycodone/Dilaudid and bilateral piriformis muscle injection with good pain relief when her pain gets significantly worse.      Today her main complaints are following:  - Overall feels her pain is not optimized. Feels like she's been hit by a bus.   - Struggled this summer. Tough to take care of herself and her home.   - Radiating all over her body. Overall  hasn't changed in total character.   - Feels numb from face down into her whole body. Feels like her brain is being pulled down, constant fog.   - She says she missed her appointment on 8/15 for an SUNSHINE because she took a dilaudid in the morning due to pain and fell asleep.   - She saw Dr. Cecile ARCINIEGA. She's very interested in that. She has a pain psychology appointment scheduled for 9/06/2024. She would also be interested in a pain pump.   - She had an epidural blood patch on 7/2/24 with Dr. Eaton. She thinks it helped with her overall headache presentation.   - The Dilaudid recently given was helpful but when only given one per day, she feels like she has to choose between sleep and daily duties. She keeps her tramadol as a back up.  - She continues with her Butrans. She's wondering if it can be increased when she's in a flare.   - Continues to use celecoxib, lidocaine patches intermittently, medical cannabis daily (balm, gummies, ingestible). Not using methocarbamol, doesn't think it worked. Using her TENS unit. Does lots of herbal treatments.   - Continues to follow with Neurology for migraine management.   - Tonight she is leaving for a 9 day wedding trip. She's hoping for additional pain medications to help with this trip.       [place] Prescription Monitoring Program:   Reviewed. No concerns     Review of Systems:  The 14 system ROS was reviewed and was negative except what is documented above and as follows.  Any bowel or bladder problems: none  Mood: poor    Physical Exam:  Vitals:    08/19/24 0838   BP: 108/77   Pulse: 81   Resp: 16   SpO2: 98%       General: Awake in no apparent distress.   Eyes: Sclerae are anicteric. EOMI   Lungs: unlabored.   Heart: warm and well perfused  Abdomen: not distended  Extremities: Pulses are well palpable, no peripheral edema.   Musculoskeletal: Greater than anti gravity strength in all extremities  Neurologic exam: Sensation intact throughout all dermatomes bilateral  upper extremities and lower extremities  Psychiatric; Tearful affect.   Skin: Warm and Dry.    Medications:  Current Outpatient Medications   Medication Sig Dispense Refill    acetaminophen (TYLENOL) 500 MG tablet Take 1,000 mg by mouth every 6 hours as needed for mild pain      atorvastatin (LIPITOR) 20 MG tablet TAKE ONE TABLET BY MOUTH ONCE DAILY 90 tablet 3    buprenorphine (BUTRANS) 10 MCG/HR WK patch Place 1 patch onto the skin once a week 4 patch 1    butalbital-acetaminophen-caffeine (ESGIC) -40 MG tablet Take 1 tablet by mouth every 6 hours as needed for headaches 10 tablet 0    celecoxib (CELEBREX) 100 MG capsule TAKE ONE CAPSULE BY MOUTH TWICE A DAY AS NEEDED FOR MODERATE PAIN. TAKE WITH FOOD 60 capsule 2    cetirizine (ZYRTEC) 10 MG tablet Take 10 mg by mouth daily as needed       co-enzyme Q-10 100 MG CAPS capsule Take 100 mg by mouth daily      cyclobenzaprine (FLEXERIL) 10 MG tablet TAKE ONE TABLET BY MOUTH THREE TIMES A DAY AS NEEDED FOR MUSCLE SPASMS 60 tablet 0    DULoxetine (CYMBALTA) 30 MG capsule TAKE 2 CAPSULES BY MOUTH IN THE MORNING AND 1 CAPSULE BY MOUTH AT BEDTIME 270 capsule 1    EPINEPHrine (ANY BX GENERIC EQUIV) 0.3 MG/0.3ML injection 2-pack Inject 0.3 mLs (0.3 mg) into the muscle as needed for anaphylaxis May repeat one time in 5-15 minutes if response to initial dose is inadequate. 2 each 1    ferrous fumarate 65 mg, [place]. FE,-Vitamin C 125 mg (VITRON-C)  MG TABS tablet Take 1 tablet by mouth every other day 60 tablet 4    fluticasone (FLONASE) 50 MCG/ACT nasal spray Spray 2 sprays into both nostrils daily Call clinic to schedule follow up appointment. 48 mL 11    guanFACINE (TENEX) 1 MG tablet TAKE ONE TAB BY MOUTH ONCE NIGHTLY AT BEDTIME      HYDROmorphone (DILAUDID) 2 MG tablet Take 1 tablet (2 mg) by mouth daily as needed for pain 10 tablet 0    hydrOXYzine HCl (ATARAX) 25 MG tablet TAKE 1 TABLET (25 MG) BY MOUTH EVERY 6 HOURS AS NEEDED FOR ANXIETY 180 tablet 1     ibuprofen ([employer] IBUPROFEN) 200 MG capsule Take 600 mg by mouth every 4 hours as needed for fever      ivabradine (CORLANOR) 7.5 MG tablet Take 1 tablet (7.5 mg) by mouth 2 times daily 180 tablet 3    Lidocaine (LIDOCARE) 4 % Patch Place 1-3 patches onto the skin every 24 hours To prevent lidocaine toxicity, patient should be patch free for 12 hrs daily. 20 patch 0    medical cannabis (Patient's own supply.  Not a prescription) Take 1 Dose by mouth See Admin Instructions Capsule formulation - uses as needed      metoclopramide (REGLAN) 10 MG tablet Take 1 tablet (10 mg) by mouth 4 times daily as needed (headache) 40 tablet 3    Multiple Vitamins-Minerals (MULTIVITAMIN PO) Take 1 tablet by mouth daily       naloxone (NARCAN) 4 MG/0.1ML nasal spray Spray 1 spray (4 mg) into one nostril alternating nostrils once as needed for opioid reversal every 2-3 minutes until assistance arrives 0.2 mL 0    phentermine 30 MG capsule TAKE ONE CAPSULE BY MOUTH EVERY MORNING 30 capsule 1    Probiotic Product (PROBIOTIC DAILY PO) Take 6 packets by mouth every morning Five-Lac      propranolol ER (INDERAL LA) 120 MG 24 hr capsule Take 1 capsule (120 mg) by mouth daily 90 capsule 3    ramelteon (ROZEREM) 8 MG tablet TAKE 1 TABLET (8 MG) BY MOUTH AT BEDTIME 30 tablet 3    rizatriptan (MAXALT-MLT) 5 MG ODT TAKE 1-2 TABLETS (5-10 MG) BY MOUTH AT ONSET OF HEADACHE FOR MIGRAINE (MAX 30 MG IN 24 HOURS) 18 tablet 11    Semaglutide-Weight Management (WEGOVY) 0.5 MG/0.5ML pen Inject 0.5 mg subcutaneously once a week 2 mL 0    SUMAtriptan (IMITREX STATDOSE) 6 MG/0.5ML pen injector kit Inject 0.5 ml (6 mg) subcutaneously at onset of migraine, May repeat in 1 hour , Max 12 mg/24 hrs 2 kit 11    topiramate (TOPAMAX) 100 MG tablet Take 1 tablet (100 mg) by mouth daily 90 tablet 3    traMADol (ULTRAM) 50 MG tablet TAKE ONE TABLET BY MOUTH EVERY 6 HOURS AS NEEDED FOR PAIN 65 tablet 0    vitamin B complex with vitamin C (STRESS TAB) tablet Take 1 tablet  by mouth daily      VITAMIN D, CHOLECALCIFEROL, PO Take 2,000 Units by mouth daily               Admission on 06/23/2024, Discharged on 06/23/2024   Component Date Value Ref Range Status    Sodium 06/23/2024 137  135 - 145 mmol/L Final    Reference intervals for this test were updated on 09/26/2023 to more accurately reflect our healthy population. There may be differences in the flagging of prior results with similar values performed with this method. Interpretation of those prior results can be made in the context of the updated reference intervals.     Potassium 06/23/2024 4.0  3.4 - 5.3 mmol/L Final    Carbon Dioxide (CO2) 06/23/2024 23  22 - 29 mmol/L Final    Anion Gap 06/23/2024 8  7 - 15 mmol/L Final    Urea Nitrogen 06/23/2024 14.3  6.0 - 20.0 mg/dL Final    Creatinine 06/23/2024 0.93  0.51 - 0.95 mg/dL Final    GFR Estimate 06/23/2024 74  >60 mL/min/1.73m2 Final    eGFR calculated using 2021 CKD-EPI equation.    Calcium 06/23/2024 8.6  8.6 - 10.0 mg/dL Final    Chloride 06/23/2024 106  98 - 107 mmol/L Final    Glucose 06/23/2024 88  70 - 99 mg/dL Final    Alkaline Phosphatase 06/23/2024 102  40 - 150 U/L Final    AST 06/23/2024 57 (H)  0 - 45 U/L Final    Reference intervals for this test were updated on 6/12/2023 to more accurately reflect our healthy population. There may be differences in the flagging of prior results with similar values performed with this method. Interpretation of those prior results can be made in the context of the updated reference intervals.    ALT 06/23/2024 56 (H)  0 - 50 U/L Final    Reference intervals for this test were updated on 6/12/2023 to more accurately reflect our healthy population. There may be differences in the flagging of prior results with similar values performed with this method. Interpretation of those prior results can be made in the context of the updated reference intervals.      Protein Total 06/23/2024 6.1 (L)  6.4 - 8.3 g/dL Final    Albumin 06/23/2024  4.1  3.5 - 5.2 g/dL Final    Bilirubin Total 06/23/2024 0.7  <=1.2 mg/dL Final    Magnesium 06/23/2024 2.4 (H)  1.7 - 2.3 mg/dL Final    CRP Inflammation 06/23/2024 <3.00  <5.00 mg/L Final    WBC Count 06/23/2024 7.1  4.0 - 11.0 10e3/uL Final    RBC Count 06/23/2024 4.08  3.80 - 5.20 10e6/uL Final    Hemoglobin 06/23/2024 12.7  11.7 - 15.7 g/dL Final    Hematocrit 06/23/2024 38.0  35.0 - 47.0 % Final    MCV 06/23/2024 93  78 - 100 fL Final    MCH 06/23/2024 31.1  26.5 - 33.0 pg Final    MCHC 06/23/2024 33.4  31.5 - 36.5 g/dL Final    RDW 06/23/2024 12.9  10.0 - 15.0 % Final    Platelet Count 06/23/2024 218  150 - 450 10e3/uL Final    % Neutrophils 06/23/2024 52  % Final    % Lymphocytes 06/23/2024 37  % Final    % Monocytes 06/23/2024 8  % Final    % Eosinophils 06/23/2024 2  % Final    % Basophils 06/23/2024 1  % Final    % Immature Granulocytes 06/23/2024 0  % Final    NRBCs per 100 WBC 06/23/2024 0  <1 /100 Final    Absolute Neutrophils 06/23/2024 3.7  1.6 - 8.3 10e3/uL Final    Absolute Lymphocytes 06/23/2024 2.7  0.8 - 5.3 10e3/uL Final    Absolute Monocytes 06/23/2024 0.5  0.0 - 1.3 10e3/uL Final    Absolute Eosinophils 06/23/2024 0.2  0.0 - 0.7 10e3/uL Final    Absolute Basophils 06/23/2024 0.1  0.0 - 0.2 10e3/uL Final    Absolute Immature Granulocytes 06/23/2024 0.0  <=0.4 10e3/uL Final    Absolute NRBCs 06/23/2024 0.0  10e3/uL Final       ASSESSMENT:       Diagnoses         Codes Comments    Postlaminectomy syndrome, lumbar region    -  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     Spondylolisthesis of lumbar region     M43.16     Facet arthropathy, cervical     M47.812     Chronic sacroiliac joint pain     M53.3, G89.29     Lumbar radiculopathy, chronic     M54.16     Piriformis syndrome of both sides     G57.03     S/P lumbar fusion     Z98.1     Opioid type dependence, continuous (H)     F11.20     POTS (postural orthostatic tachycardia syndrome)     G90.A           PLAN:     Continue Butrans 10 mics per hour.  4 patches per  month  Tramadol 50 mg daily as needed.  65 tablets dispensed.  Dilaudid 1 mg twice daily as needed for upcoming trip.   Recommend over-the-counter lidocaine 4% patches for the most painful spots on lower back every 12 hours  Continue using medical cannabis  Continue methocarbamol 500 mg 4 times daily as needed.    Sumatriptan/rizatriptan for migraine headache as needed.   Patient had previously stopped amitriptyline due to weight gain  Lumbar L3/L4 SUNSHINE and bilateral piriformis (recommended by Dr. Enciso) PRN.  Repeat L3/L4 SUNSHINE scheduled for 9/06/2024  She is following Dr. Huber about possible SCS. She has an appointment with neuropsychology on 9/11/2024 for SCS evaluation. She plans to follow up with Dr. Huber following this appointment.   New referral for acupuncture   New referral for physical therapy     Assessment will be ongoing with changes in treatment as indicated.  Benefits/risks/alternatives to treatment have been reviewed and the patient has been instructed to contact this office if they have any questions or concerns.  This plan of care has been discussed with the patient and the patient is in agreement.     Patient was discussed with attending physician, Dr. Martinez.     Adolfo Damian DO  Pain Fellow  [facility]

## 2024-08-19 NOTE — NURSING NOTE
Patient presents with:  Pain  RECHECK: Changes in pain.  Has been increasing over the past 4 months.  Numbness from face throughout body.  First time in seven years that nothing has been helping the pain.      Extreme Pain (8)     Pain Medications       Analgesics Other Refills Start End     acetaminophen (TYLENOL) 500 MG tablet --  --    Sig - Route: Take 1,000 mg by mouth every 6 hours as needed for mild pain - Oral    Class: Historical       Analgesic Combinations Refills Start End     butalbital-acetaminophen-caffeine (ESGIC) -40 MG tablet 0 5/6/2024 --    Sig - Route: Take 1 tablet by mouth every 6 hours as needed for headaches - Oral    Class: E-Prescribe       Opioid Agonists Refills Start End     HYDROmorphone (DILAUDID) 2 MG tablet 0 8/12/2024 --    Sig - Route: Take 1 tablet (2 mg) by mouth daily as needed for pain - Oral    Class: E-Prescribe    Earliest Fill Date: 8/12/2024     traMADol (ULTRAM) 50 MG tablet 0 8/2/2024 --    Sig - Route: TAKE ONE TABLET BY MOUTH EVERY 6 HOURS AS NEEDED FOR PAIN - Oral    Class: E-Prescribe    No prior authorization was found for this prescription.    Found prior authorization for another prescription for the same medication: Closed - Other       Opioid Partial Agonists Refills Start End     buprenorphine (BUTRANS) 10 MCG/HR WK patch 1 8/12/2024 --    Sig - Route: Place 1 patch onto the skin once a week - Transdermal    Class: E-Prescribe    No prior authorization was found for this prescription.    Found prior authorization for another prescription for the same medication: Approved            What medications are you using for pain? Tylenol, ASGIC, dilaudid, tramadol, butrans    What refills are you needing today? yes    Expectations: get pain under control, neck brace?    Ginny Holland, RK

## 2024-08-19 NOTE — PATIENT INSTRUCTIONS
An anti-inflammatory diet should include these foods:    tomatoes    olive oil    green leafy vegetables, such as spinach, kale, and collards    nuts like almonds and walnuts    fatty fish like salmon, mackerel, tuna, and sardines    fruits such as strawberries, blueberries, cherries, and oranges      Avoid following proinflammatory foods    refined carbohydrates, such as white bread and pastries    French fries and other fried foods    soda and other sugar-sweetened beverages    red meat (burgers, steaks) and processed meat (hot dogs, sausage)    margarine, shortening, and lard     Medications:    HYDROmorphone (DILAUDID) HCL 2 MG - Take 1 tablet (2 mg) by mouth daily as needed for pain    Methocarbamol (ROBAXIN) 500 MG - Take 1 tablet (500 mg) by mouth 4 times daily    For refills of opioid medications - You MUST call (Or MyChart) the clinic DIRECTLY and at least 7 days before you run out of your medication.      Referrals:    Physical Therapy Referral placed-   If you have not heard from the scheduling office within 2 business days, please call [phone] for all locations     Acupuncture Referral.  -Please call your insurance provider to find out about acupuncture coverage, being that not all policies cover acupuncture services.       Recommended Follow up:      Follow up as needed.       To speak with a nurse, schedule/reschedule/cancel a clinic appointment, or request a medication refill call: [phone].    You can also reach us by [employer]: [email or web address]

## 2024-09-03 DIAGNOSIS — M96.1 POSTLAMINECTOMY SYNDROME: ICD-10-CM

## 2024-09-03 NOTE — TELEPHONE ENCOUNTER
Refill request    Medication:   traMADol (ULTRAM) 50 MG tablet     cyclobenzaprine (FLEXERIL) 10 MG tablet     Sig:   tramadol-TAKE ONE TABLET BY MOUTH EVERY 6 HOURS AS NEEDED FOR PAIN     Cyclobenzaprine-TAKE ONE TABLET BY MOUTH THREE TIMES A DAY AS NEEDED FOR MUSCLE SPASMS -      Dispensed/refills:  tramadol-65/0    Cyclobenzaprine-60/0  SOLD to the pt on:   tramadol-8/13/24   Last prescribed to patient: cyclobenzaprine-7/23/24     Last clinic appointment: 8/19/24  Next clinic appointment: none listed    Last Drug Screen Collected: 5/25/23  Controlled Substance Agreement signed: 5/25/23      Preferred pharmacy:    [place], [place] - [address] [place] [address]    Refill request routed to the provider to review.

## 2024-09-04 ENCOUNTER — TELEPHONE (OUTPATIENT)
Dept: NEUROLOGY | Facility: CLINIC | Age: 54
End: 2024-09-04
Payer: COMMERCIAL

## 2024-09-04 NOTE — TELEPHONE ENCOUNTER
LVM informing patient of change of appt length to botox appts starting at the new year for Dr Nguyen.    Pt informed her appt 2/25 has been moved to 10mins later and she can call or reschedule should this no longer work for her  Gege Huerta on 9/4/2024 at 5:05 PM

## 2024-09-06 RX ORDER — CYCLOBENZAPRINE HCL 10 MG
10 TABLET ORAL 3 TIMES DAILY PRN
Qty: 60 TABLET | Refills: 0 | Status: SHIPPED | OUTPATIENT
Start: 2024-09-06 | End: 2024-10-04

## 2024-09-06 RX ORDER — TRAMADOL HYDROCHLORIDE 50 MG/1
50 TABLET ORAL EVERY 6 HOURS PRN
Qty: 65 TABLET | Refills: 0 | Status: SHIPPED | OUTPATIENT
Start: 2024-09-06 | End: 2024-10-04

## 2024-09-06 NOTE — TELEPHONE ENCOUNTER
Semaglutide-Weight Management (WEGOVY) 0.5 MG/0.5ML pen       Last Written Prescription Date:  8/8/24  Last Fill Quantity: 2ml,   # refills: 0  Last Office Visit : 6/20/24  Future Office visit:  9/16/24    Routing refill request to provider for review/approval because:  Drug not on the [facility], [facility] or [facility] refill protocol or controlled substance  Sonali JOSE RN  [facility]/Red Flag Triage & Med Refill Team

## 2024-09-09 ENCOUNTER — LAB (OUTPATIENT)
Dept: LAB | Facility: CLINIC | Age: 54
End: 2024-09-09
Payer: COMMERCIAL

## 2024-09-09 ENCOUNTER — VIRTUAL VISIT (OUTPATIENT)
Dept: FAMILY MEDICINE | Facility: CLINIC | Age: 54
End: 2024-09-09
Payer: COMMERCIAL

## 2024-09-09 DIAGNOSIS — R50.9 FEVER, UNSPECIFIED FEVER CAUSE: ICD-10-CM

## 2024-09-09 DIAGNOSIS — M96.1 POSTLAMINECTOMY SYNDROME: ICD-10-CM

## 2024-09-09 DIAGNOSIS — Q79.60 EHLERS-DANLOS SYNDROME: ICD-10-CM

## 2024-09-09 DIAGNOSIS — M96.1 FAILED BACK SURGICAL SYNDROME: ICD-10-CM

## 2024-09-09 DIAGNOSIS — R05.1 ACUTE COUGH: Primary | ICD-10-CM

## 2024-09-09 LAB
DEPRECATED S PYO AG THROAT QL EIA: NEGATIVE
FLUAV RNA SPEC QL NAA+PROBE: NEGATIVE
FLUBV RNA RESP QL NAA+PROBE: NEGATIVE
GROUP A STREP BY PCR: NOT DETECTED
RSV RNA SPEC NAA+PROBE: NEGATIVE
SARS-COV-2 RNA RESP QL NAA+PROBE: POSITIVE

## 2024-09-09 PROCEDURE — 87637 SARSCOV2&INF A&B&RSV AMP PRB: CPT

## 2024-09-09 PROCEDURE — 99214 OFFICE O/P EST MOD 30 MIN: CPT | Mod: 95

## 2024-09-09 PROCEDURE — 87651 STREP A DNA AMP PROBE: CPT

## 2024-09-09 RX ORDER — ALBUTEROL SULFATE 90 UG/1
2 AEROSOL, METERED RESPIRATORY (INHALATION) EVERY 6 HOURS PRN
Qty: 18 G | Refills: 0 | Status: SHIPPED | OUTPATIENT
Start: 2024-09-09

## 2024-09-09 RX ORDER — BENZONATATE 200 MG/1
200 CAPSULE ORAL 3 TIMES DAILY PRN
Qty: 45 CAPSULE | Refills: 0 | Status: SHIPPED | OUTPATIENT
Start: 2024-09-09

## 2024-09-09 ASSESSMENT — ENCOUNTER SYMPTOMS: HEADACHES: 1

## 2024-09-09 NOTE — PROGRESS NOTES
Ada is a 53 year old who is being evaluated via a billable video visit.    How would you like to obtain your AVS? MyChart  If the video visit is dropped, the invitation should be resent by: Text to cell phone: [phone]  Will anyone else be joining your video visit? No      Assessment & Plan     Acute cough, Fever, unspecified fever cause  Continue home care treatments as discussed  - benzonatate (TESSALON) 200 MG capsule  Dispense: 45 capsule; Refill: 0  - albuterol (PROAIR HFA/PROVENTIL HFA/VENTOLIN HFA) 108 (90 Base) MCG/ACT inhaler  Dispense: 18 g; Refill: 0  - Symptomatic Influenza A/B, RSV, & SARS-CoV2 PCR (COVID-19)  - Streptococcus A Rapid Screen w/Reflex to PCR - Clinic Collect  - education provided on follow up/ER criteria     Follow up if symptoms worsening or failing to improve in 3-5 days      Subjective   Ada is a 53 year old, presenting for the following health issues:    Pt reports for 1-2 wk hx of cough, ST, fever, msk pain, HA, and fatigue. Went on vacation with family and now multiple members are ill with similar symptoms. 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     Pt endorses: rhinorrhea , ST, SOB with activity but not at rest.. + fevers at home (highest 102). Has been taking OTC with modest improvement. Able to take PO.     Describes HA/neck pain as following her usual migraine patterns when ill.     Denies sinus pain/pressure/purulent discharge             9/9/2024     8:32 AM   Additional Questions   Roomed by Brigette JOSE CMA     Video Start Time:  0900    URI  Associated symptoms include headaches.   Headache     History of Present Illness       Reason for visit:  URI  Symptom onset:  1-2 weeks ago  Symptoms include:  Cough, sore throat, headache, stiff neck  Symptom intensity:  Severe  Symptom progression:  Worsening  Had these symptoms before:  Yes  Has tried/received treatment for these symptoms:  Yes  Previous treatment was successful:  Yes  Prior treatment  description:  Antibiotics  What makes it worse:  Talking, Exsersion  What makes it better:  Musinex is helping but symptoms are staying the same   She is taking medications regularly.       Review of Systems  Constitutional, HEENT, cardiovascular, pulmonary, gi and gu systems are negative, except as otherwise noted.      Objective           Vitals:  No vitals were obtained today due to virtual visit.    Physical Exam   GENERAL: alert and no distress  EYES: Eyes grossly normal to inspection.  No discharge or erythema, or obvious scleral/conjunctival abnormalities.  RESP: No audible wheeze, cough, or visible cyanosis.    SKIN: Visible skin clear. No significant rash, abnormal pigmentation or lesions.  NEURO: Cranial nerves grossly intact.  Mentation and speech appropriate for age.  PSYCH: Appropriate affect, tone, and pace of words          Video-Visit Details    Type of service:  Video Visit   Video End Time:9:25 AM  Originating Location (pt. Location): Home  Distant Location (provider location):  Off-site  Platform used for Video Visit: Sobeida  Signed Electronically by: FIDELINA Parker CNP

## 2024-09-10 ENCOUNTER — TELEPHONE (OUTPATIENT)
Dept: NURSING | Facility: CLINIC | Age: 54
End: 2024-09-10

## 2024-09-10 RX ORDER — METHOCARBAMOL 500 MG/1
500 TABLET, FILM COATED ORAL 4 TIMES DAILY
Qty: 30 TABLET | Refills: 0 | Status: SHIPPED | OUTPATIENT
Start: 2024-09-10

## 2024-09-10 NOTE — TELEPHONE ENCOUNTER
Patient classified as COVID treatment eligible by Epic high risk algorithm:  No    Coronavirus (COVID-19) Notification    Reason for call  Notify of POSITIVE COVID-19 lab result, assess symptoms,  review [facility] recommendations    Lab Result   Lab test for 2019-nCoV rRt-PCR or SARS-COV-2 PCR  Oropharyngeal AND/OR nasopharyngeal swabs were POSITIVE for 2019-nCoV RNA [OR] SARS-COV-2 RNA (COVID-19) RNA     We have been unable to reach patient by phone at this time to notify of their Positive COVID-19 result.    Left voicemail message requesting a call back to [phone] [facility] for results.        A Positive COVID-19 letter will be sent via [employer] or the mail.    Valentine Bajwa

## 2024-09-11 ENCOUNTER — TELEPHONE (OUTPATIENT)
Dept: INTERNAL MEDICINE | Facility: CLINIC | Age: 54
End: 2024-09-11
Payer: COMMERCIAL

## 2024-09-11 ENCOUNTER — VIRTUAL VISIT (OUTPATIENT)
Dept: NEUROPSYCHOLOGY | Facility: CLINIC | Age: 54
End: 2024-09-11
Attending: NEUROLOGICAL SURGERY
Payer: COMMERCIAL

## 2024-09-11 DIAGNOSIS — M96.1 LUMBAR POST-LAMINECTOMY SYNDROME: ICD-10-CM

## 2024-09-11 DIAGNOSIS — M96.1 FAILED BACK SURGICAL SYNDROME: ICD-10-CM

## 2024-09-11 DIAGNOSIS — F54 PSYCHOLOGICAL FACTOR AFFECTING PHYSICAL CONDITION: Primary | ICD-10-CM

## 2024-09-11 PROCEDURE — 96139 PSYCL/NRPSYC TST TECH EA: CPT | Mod: 95

## 2024-09-11 PROCEDURE — 90791 PSYCH DIAGNOSTIC EVALUATION: CPT | Mod: 95

## 2024-09-11 PROCEDURE — 96138 PSYCL/NRPSYC TECH 1ST: CPT | Mod: 95

## 2024-09-11 PROCEDURE — 96130 PSYCL TST EVAL PHYS/QHP 1ST: CPT | Mod: 95

## 2024-09-11 RX ORDER — SEMAGLUTIDE 0.5 MG/.5ML
INJECTION, SOLUTION SUBCUTANEOUS
Qty: 2 ML | Refills: 0 | Status: SHIPPED | OUTPATIENT
Start: 2024-09-11

## 2024-09-11 RX ORDER — DULOXETIN HYDROCHLORIDE 30 MG/1
CAPSULE, DELAYED RELEASE ORAL
Qty: 270 CAPSULE | Refills: 1 | Status: SHIPPED | OUTPATIENT
Start: 2024-09-11

## 2024-09-11 NOTE — TELEPHONE ENCOUNTER
PT calls stating she did VV on 9/9 and now her lab COVID PCR test came back Positive. She has been having symptoms for 9-10 days.   Voice is scratchy. Has a sore throat.     She states the doctor told her if she still has  symptoms in 2-3 days, to schedule an In Person appointment to make sure she doesn't have Pneumonia.     Her daughter also has COVID, home test. PT is asking to schedule both her and her dtr appointments back to [facility] tomorrow.     Pt states she still has tight chest, coughing, very fatigued, hard to get a deep breath. Feels like has a temperature.   She did have a temp of 102-103 temperature before. Has not checked today.     Using the inhaler the doctor prescribed.   When coughs, big chunks of colored green phlegm are coming up.     Middle of her back is hurting.   Taking Tylenol and Ibuprofen.     Drinking a lot of Gatorade.     Pt is Refusing to go to  due to the wait times.   Transferred back to scheduling to see about scheduling at [place] [facility].   She verbalized understanding.
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
## 2024-09-11 NOTE — TELEPHONE ENCOUNTER
Refill request    Medication: DULoxetine (CYMBALTA) 30 MG capsule    Sig: TAKE 2 CAPSULES BY MOUTH IN THE MORNING AND 1 CAPSULE BY MOUTH AT BEDTIME    Dispensed: 270  Refills: 1    Last prescribed to patient: 2/12/24     Last clinic appointment: 8/19/24  Next clinic appointment: none listed      Preferred pharmacy:    [facility] [address] [place] [address]    Refill request routed to the provider to review.
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
## 2024-09-11 NOTE — PROGRESS NOTES
Pt was seen for neuropsychological evaluation at the request of Dr. Kirk Huber for the purposes of diagnostic clarification and treatment planning. 64 minutes of video test administration and scoring were provided by this writer. Please see Dr. Guicho Barrera's report for a full interpretation of the findings.    Jairon Mckenzie MA, CSP

## 2024-09-12 ENCOUNTER — ANCILLARY PROCEDURE (OUTPATIENT)
Dept: GENERAL RADIOLOGY | Facility: CLINIC | Age: 54
End: 2024-09-12
Payer: COMMERCIAL

## 2024-09-12 ENCOUNTER — OFFICE VISIT (OUTPATIENT)
Dept: FAMILY MEDICINE | Facility: CLINIC | Age: 54
End: 2024-09-12
Payer: COMMERCIAL

## 2024-09-12 VITALS
DIASTOLIC BLOOD PRESSURE: 60 MMHG | BODY MASS INDEX: 26.64 KG/M2 | SYSTOLIC BLOOD PRESSURE: 92 MMHG | WEIGHT: 175.2 LBS | HEART RATE: 98 BPM | TEMPERATURE: 97.7 F | RESPIRATION RATE: 18 BRPM | OXYGEN SATURATION: 99 %

## 2024-09-12 DIAGNOSIS — R05.9 COUGH, UNSPECIFIED TYPE: Primary | ICD-10-CM

## 2024-09-12 DIAGNOSIS — J06.9 VIRAL UPPER RESPIRATORY TRACT INFECTION: ICD-10-CM

## 2024-09-12 DIAGNOSIS — U07.1 INFECTION DUE TO 2019 NOVEL CORONAVIRUS: ICD-10-CM

## 2024-09-12 DIAGNOSIS — J02.9 SORE THROAT: ICD-10-CM

## 2024-09-12 DIAGNOSIS — M79.10 MYALGIA: ICD-10-CM

## 2024-09-12 DIAGNOSIS — R05.9 COUGH, UNSPECIFIED TYPE: ICD-10-CM

## 2024-09-12 PROCEDURE — 99213 OFFICE O/P EST LOW 20 MIN: CPT

## 2024-09-12 PROCEDURE — 71046 X-RAY EXAM CHEST 2 VIEWS: CPT | Mod: TC | Performed by: RADIOLOGY

## 2024-09-12 RX ORDER — BENZONATATE 100 MG/1
100 CAPSULE ORAL 3 TIMES DAILY PRN
Qty: 30 CAPSULE | Refills: 0 | Status: SHIPPED | OUTPATIENT
Start: 2024-09-12

## 2024-09-12 ASSESSMENT — ENCOUNTER SYMPTOMS: COUGH: 1

## 2024-09-12 NOTE — PROGRESS NOTES
"  Assessment & Plan     Cough, unspecified type  Cough has become productive. Will get chest xray today to rule out superimposed bacterial infection. Also sent refills of tessalon pearls per patient request.   - XR Chest 2 Views  - benzonatate (TESSALON) 100 MG capsule  Dispense: 30 capsule; Refill: 0    Infection due to 2019 novel coronavirus  Viral upper respiratory tract infection  Myalgia  Sore throat  Offered repeat viral swab panel today and patient declined.   Continue taking ibuprofen and tylenol every 4-6 hours as needed for myalgias, fevers, and headaches. Patient can continue using Dayquil/Nyquil and tessalon pearls. Also recommended picking up Cepacol lozenges to sooth sore throat. Encouraged patient drink plenty of fluids and electrolyte drinks and prioritize rest and getting good sleep.    Patient should return in 1-2 weeks if symptoms persist or sooner for worsening symptoms.    La Claire PA-C      BMI  Estimated body mass index is 26.64 kg/m  as calculated from the following:    Height as of 6/25/24: 1.727 m (5' 8\").    Weight as of this encounter: 79.5 kg (175 lb 3.2 oz).   Weight management plan: Patient was referred to their PCP to discuss a diet and exercise plan.      Barak Caballero is a 53 year old, presenting for the following health issues:  Cough and Covid Concern        9/12/2024     8:48 AM   Additional Questions   Roomed by Vane     History of Present Illness       Reason for visit:  URI  Symptom onset:  1-2 weeks ago  Symptoms include:  Cough, sore throat, headache, stiff neck  Symptom intensity:  Severe  Symptom progression:  Worsening  Had these symptoms before:  Yes  Has tried/received treatment for these symptoms:  Yes  Previous treatment was successful:  Yes  Prior treatment description:  Antibiotics  What makes it worse:  Talking, Exsersion  What makes it better:  Musinex is helping but symptoms are staying the same   She is taking medications regularly.     She tested " positive for COVID on 9-10-24 but has had symptoms for 13 days.  Her test was done at [facility] after virtual visit    Was prescribed an albuterol inhaler last Tuesday which has been helpful. Is now coughing up green phlegm. Endorses fevers, chills, myalgias, sore throat, rhinorrhea.    ROS  Per HPI        Objective    BP 92/60   Pulse 98   Temp 97.7  F (36.5  C) (Tympanic)   Resp 18   Wt 79.5 kg (175 lb 3.2 oz)   LMP  (LMP Unknown)   SpO2 99%   BMI 26.64 kg/m    Body mass index is 26.64 kg/m .  Physical Exam  Constitutional:       General: She is not in acute distress.     Appearance: She is ill-appearing. She is not toxic-appearing or diaphoretic.   HENT:      Head: Normocephalic.      Right Ear: Tympanic membrane and ear canal normal.      Left Ear: Tympanic membrane and ear canal normal.      Nose: Congestion and rhinorrhea present.      Mouth/Throat:      Mouth: Mucous membranes are moist.      Pharynx: Oropharynx is clear. No oropharyngeal exudate.      Comments: +Post nasal drip  Eyes:      Conjunctiva/sclera: Conjunctivae normal.   Cardiovascular:      Rate and Rhythm: Normal rate and regular rhythm.      Heart sounds: Normal heart sounds.   Pulmonary:      Effort: Pulmonary effort is normal.      Breath sounds: Normal breath sounds.   Abdominal:      General: Abdomen is flat.      Palpations: Abdomen is soft.   Musculoskeletal:         General: Normal range of motion.      Cervical back: Normal range of motion and neck supple. No tenderness.   Skin:     General: Skin is warm and dry.   Neurological:      Mental Status: She is alert. Mental status is at baseline.   Psychiatric:         Mood and Affect: Mood normal.         Behavior: Behavior normal.         Thought Content: Thought content normal.         Judgment: Judgment normal.                Signed Electronically by: La Claire PA-C

## 2024-09-12 NOTE — PATIENT INSTRUCTIONS
It was a pleasure and a privilege caring for you today. Please see below regarding next steps:  - Continue taking nyquil/dayquil, tylenol, ibuprofen, and mucinex every 4-6 hours as needed.  -  over the counter Cepacol lozenges to help sooth sore throat.   - Use albuterol inhaler 1-2 puffs every 4-6 hours as needed to help open airways.  - Prioritize rest, drinking plenty of fluids, and self cares.      Coronavirus (COVID-19): Care Instructions  What is COVID-19?  COVID-19 is a disease caused by a type of coronavirus. This illness was first found in 2019 and has since spread worldwide (pandemic). Symptoms can range from mild, such as fever and body aches, to severe, including trouble breathing. COVID-19 can be deadly.  Coronaviruses are a large group of viruses. Some types cause the common cold. Others cause more serious illnesses like Middle East respiratory syndrome (MERS) and severe acute respiratory syndrome (SARS).  Follow-up care is a key part of your treatment and safety. Be sure to make and go to all appointments, and call your doctor if you are having problems. It's also a good idea to know your test results and keep a list of the medicines you take.  How can you self-isolate when you have COVID-19?  If you have COVID-19, there are things you can do to help avoid spreading the virus to others.  Stay home, and avoid contact with other people.  Limit contact with people in your home. If possible, stay in a separate bedroom and use a separate bathroom.  Wear a high-quality mask when you are around other people.  Improve airflow. If you have to spend time indoors with others, open windows and doors. Or you can use a fan to blow air away from people and out a window.  Avoid contact with pets and other animals.  Cover your mouth and nose with a tissue when you cough or sneeze. Then throw it in the trash right away.  Wash your hands often, especially after you cough or sneeze. Use soap and water, and scrub  for at least 20 seconds. If soap and water aren't available, use an alcohol-based hand .  Don't share personal household items. These include bedding, towels, cups and glasses, and eating utensils.  Wash laundry in the warmest water allowed for the fabric type, and dry it completely. It's okay to wash other people's laundry with yours.  Clean and disinfect your home. Use household  and disinfectant wipes or sprays.  Go to the CDC website at cdc.gov if you have questions.  When can you end self-isolation for COVID-19?  If you know or think that you have the virus, you will need to self-isolate. When you can be around other people you live with and leave home depends on whether you have symptoms. Important: Day 0 is the day your symptoms started or the day you tested positive. Day 1 is the day after your symptoms first started or your test was positive.  If you tested positive but had no symptoms, it's safe to end isolation at the end of Day 5. But if you start to have symptoms, follow the recommendations below, and count your first day of symptoms as Day 0.  If you have symptoms, when you can end isolation depends on how sick you were and your overall health. No matter what, you need to wait until your symptoms are getting better and you haven't had a fever for 24 hours while not taking medicines to lower the fever. Here's how long to isolate, based on your symptoms:  If you were only a little sick: (This means you might have felt really bad but had no shortness of breath and never needed to be in the hospital.) You can end isolation at the end of Day 5.  If you were more sick: (You had some shortness of breath or some trouble breathing but never needed to be in the hospital.) You can end isolation at the end of Day 10.  If you were very sick and needed to be in the hospital, or if you have a weakened immune system: You can end isolation at the end of Day 10 or later. Talk to your doctor to find out  "when it's safe to end isolation. You may need a viral test.  After you end isolation, if your symptoms come back or get worse: Restart your isolation at Day 0. Do this even if it happens after you took medicine for COVID.  Avoid travel and stay away from people at high risk for serious disease for at least 10 days.  Those who can't wear a mask because they are under 2 years old or have certain disabilities should isolate for at least 10 full days.  Call your doctor or seek care if you have questions about your symptoms or when to end isolation.  Go to the CDC website at cdc.gov if you have questions.  Where can you learn more?  Go to https://www.[employer]ends[employer].net/patiented  Enter C007 in the search box to learn more about \"Coronavirus (COVID-19): Care Instructions.\"  Current as of: May 28, 2024  Content Version: 14.1 2006-2024 [employer].   Care instructions adapted under license by your healthcare professional. If you have questions about a medical condition or this instruction, always ask your healthcare professional. [employer] disclaims any warranty or liability for your use of this information.    "

## 2024-09-23 ENCOUNTER — MYC MEDICAL ADVICE (OUTPATIENT)
Dept: INTERNAL MEDICINE | Facility: CLINIC | Age: 54
End: 2024-09-23
Payer: COMMERCIAL

## 2024-09-23 ENCOUNTER — TELEPHONE (OUTPATIENT)
Dept: NEUROLOGY | Facility: CLINIC | Age: 54
End: 2024-09-23
Payer: COMMERCIAL

## 2024-09-23 ENCOUNTER — TELEPHONE (OUTPATIENT)
Dept: NEUROSURGERY | Facility: CLINIC | Age: 54
End: 2024-09-23
Payer: COMMERCIAL

## 2024-09-23 NOTE — TELEPHONE ENCOUNTER
Returned pt's call. She has completed the neuropsych eval and is interested in moving forward with a plan, but she said she has lumbar issues as well as cervical spine issues and she needs a path that will address both of these. She understands that one intervention will not address all of her issues, but she does not think that she can be more active and present for her children and grandchildren by just addressing her low back pain.     She said she is back on Wegovy. Her PCP has been managing this. She does not have an appointment set up with an endocrinologist. She said she is very run down and thinks she may have hypothyroid but she doesn't always test within range. She took medication for hypothyroidism in the past but does not currently take anything.     Will schedule an appt with Dr. Huber to review his recommended plan of care. Pt in agreement with plan. I have messaged scheduling to tentatively schedule on 11/12 at 1:00. Pt expressed understanding. Nothing further at this time.

## 2024-09-23 NOTE — TELEPHONE ENCOUNTER
Patient is calling in regards to getting a follow up visit with Dr. Huber regarding her SCS.        Mary Jeter on 9/23/2024 at 12:29 PM

## 2024-09-23 NOTE — TELEPHONE ENCOUNTER
[facility] Call Center    Phone Message    May a detailed message be left on voicemail: yes     Reason for Call: Patient missed her Botox because of Covid.  Soonest writer could schedule is 10/9/24.  Patient requests a call back to discuss with care team what is best for her to do.      As her migraines are back strong for the last week.      Action Taken: [facility]     Travel Screening: Not Applicable     Date of Service:

## 2024-09-24 ENCOUNTER — TELEPHONE (OUTPATIENT)
Dept: NEUROSURGERY | Facility: CLINIC | Age: 54
End: 2024-09-24
Payer: COMMERCIAL

## 2024-09-24 NOTE — TELEPHONE ENCOUNTER
Spoke with pt about reschedule on 10/1 at 7am with Dr. Nguyen. Migraines are worsen, taking PRN. Nothing further needed.

## 2024-10-01 DIAGNOSIS — M96.1 POSTLAMINECTOMY SYNDROME: ICD-10-CM

## 2024-10-02 NOTE — TELEPHONE ENCOUNTER
Refill request    Medication:   traMADol (ULTRAM) 50 MG tablet    cyclobenzaprine (FLEXERIL) 10 MG tablet     Sig:   Tramadol-TAKE ONE TABLET BY MOUTH EVERY 6 HOURS AS NEEDED FOR PAIN     Cyclobenzaprine- TAKE ONE TABLET BY MOUTH THREE TIMES A DAY AS NEEDED FOR MUSCLE SPASMS     Dispensed/refills:  Tramadol-65/0    Cyclobenzaprine-60/0    SOLD to the pt on: Tramadol-9/11/24  Last prescribed to patient: Cyclobenzaprine-9/6/24     Last clinic appointment: 8/19/24  Next clinic appointment: none listed    Last Drug Screen Collected: 5/25/23  Controlled Substance Agreement signed: none seen      Preferred pharmacy:    [facility] [address] [place] [address]        Refill request routed to the provider to review.

## 2024-10-03 DIAGNOSIS — G47.00 INSOMNIA, UNSPECIFIED TYPE: ICD-10-CM

## 2024-10-04 RX ORDER — CYCLOBENZAPRINE HCL 10 MG
10 TABLET ORAL 3 TIMES DAILY PRN
Qty: 60 TABLET | Refills: 0 | Status: SHIPPED | OUTPATIENT
Start: 2024-10-04 | End: 2024-10-23

## 2024-10-04 RX ORDER — TRAMADOL HYDROCHLORIDE 50 MG/1
50 TABLET ORAL EVERY 6 HOURS PRN
Qty: 65 TABLET | Refills: 0 | Status: SHIPPED | OUTPATIENT
Start: 2024-10-04 | End: 2024-10-23

## 2024-10-07 DIAGNOSIS — M96.1 POSTLAMINECTOMY SYNDROME: ICD-10-CM

## 2024-10-07 DIAGNOSIS — M96.1 FAILED BACK SURGICAL SYNDROME: ICD-10-CM

## 2024-10-07 DIAGNOSIS — Q79.60 EHLERS-DANLOS SYNDROME: ICD-10-CM

## 2024-10-07 NOTE — TELEPHONE ENCOUNTER
Refill request    Medication:   methocarbamol (ROBAXIN) 500 MG tablet     buprenorphine (BUTRANS) 10 MCG/HR WK patch    Sig:   methocarbamol-TAKE 1 TABLET (500 MG) BY MOUTH 4 TIMES DAILY   BUTRANS-Place 1 patch onto the skin once a week   Dispensed/Refills:  methocarbamol-30/0  BUTRANS-4/1  SOLD to the pt on:   BUTRANS-9/11/24  Last prescribed to patient:   methocarbamol- 9/10/24    Last clinic appointment: 8/19/24  Next clinic appointment: none listed    Last Drug Screen Collected: 5/25/23  Controlled Substance Agreement signed: 5/25/23    Preferred pharmacy:    [place], [place] - [address] [place] [address]    Refill request routed to the provider to review.

## 2024-10-08 ENCOUNTER — TELEPHONE (OUTPATIENT)
Dept: ANESTHESIOLOGY | Facility: CLINIC | Age: 54
End: 2024-10-08

## 2024-10-08 ENCOUNTER — OFFICE VISIT (OUTPATIENT)
Dept: NEUROLOGY | Facility: CLINIC | Age: 54
End: 2024-10-08
Payer: COMMERCIAL

## 2024-10-08 DIAGNOSIS — G43.719 INTRACTABLE CHRONIC MIGRAINE WITHOUT AURA AND WITHOUT STATUS MIGRAINOSUS: Primary | ICD-10-CM

## 2024-10-08 PROCEDURE — 64615 CHEMODENERV MUSC MIGRAINE: CPT | Performed by: PSYCHIATRY & NEUROLOGY

## 2024-10-08 RX ORDER — BUPRENORPHINE 10 UG/H
PATCH TRANSDERMAL WEEKLY
Qty: 4 PATCH | Refills: 1 | Status: SHIPPED | OUTPATIENT
Start: 2024-10-08

## 2024-10-08 RX ORDER — METHOCARBAMOL 500 MG/1
500 TABLET, FILM COATED ORAL 4 TIMES DAILY
Qty: 30 TABLET | Refills: 0 | Status: SHIPPED | OUTPATIENT
Start: 2024-10-08 | End: 2024-11-04

## 2024-10-08 ASSESSMENT — HEADACHE IMPACT TEST (HIT 6)
WHEN YOU HAVE HEADACHES HOW OFTEN IS THE PAIN SEVERE: VERY OFTEN
HOW OFTEN DID HEADACHS LIMIT CONCENTRATION ON WORK OR DAILY ACTIVITY: VERY OFTEN
HIT6 TOTAL SCORE: 68
WHEN YOU HAVE A HEADACHE HOW OFTEN DO YOU WISH YOU COULD LIE DOWN: ALWAYS
HOW OFTEN DO HEADACHES LIMIT YOUR DAILY ACTIVITIES: VERY OFTEN
HOW OFTEN HAVE YOU FELT TOO TIRED TO WORK BECAUSE OF YOUR HEADACHES: VERY OFTEN
HOW OFTEN HAVE YOU FELT FED UP OR IRRITATED BECAUSE OF YOUR HEADACHES: VERY OFTEN

## 2024-10-08 NOTE — TELEPHONE ENCOUNTER
Prior Authorization Retail Medication Request    Medication/Dose: BUPRENORPHINE 10MCG/HR PTWK  Diagnosis and ICD code (if different eugene what is on RX):    New/renewal/insurance change PA/secondary ins. PA:  Previously Tried and Failed:    Rationale:      Insurance   Primary: [employer] [place]  Insurance ID:  [id number]  PHONE  [phone]    Secondary (if applicable):  Insurance ID:      Pharmacy Information (if different than what is on RX)  Name:    Phone:    Fax:        Thank you,  Amy Recinos,  Pharmacy [facility]

## 2024-10-08 NOTE — PROGRESS NOTES
Botulinum Toxin Procedure Note     Ada Welch  [id number]     Ordering provider: oNemy Nguyen MD     The procedure was explained to the patient. Benefits of the treatment were discussed including headache and migraine reduction. Risks of the procedure were reviewed including but not limited to pain, bruising, bleeding, infection, weakness of muscles injected or those distal to injection. The patient voiced understanding of the risks and benefits. All questions answered and the patient consented to proceed.      Prior to receiving Botox injections the patient reported the following:  Baseline headache/migraine frequency: more than 15 days a month  Baseline headache/migraine duration: more than 4 hours  Associated migrainous features: wavy vision distortion, nausea, photophobia     Previous treatment trials:  amitriptyline, zonisamide, gabapentin, Aimovig, Emgality, duloxetine, tizanidine, propranolol     Last Botox procedure: 5/28/2024  Current migraine frequency: Typically more than 50% reduction in migraines. Has had a flare since having COVID for over 30 days.   Current migraine duration: 1 day     Functional improvements since starting botulinum toxin treatments: In addition she notes that she is able to be more functional and interactive with her family.      Last Neurology office visit: 2/17/2024, 5/21/2024     A 200 unit vial of Botox was diluted with 4ml of 0.9% sodium chloride     Botox lot # and expiration date: See MAR for details  0.9% sodium chloride lot # and expiration date: See MAR for details     The following muscles were injected using a 30 gauge needle:  Procerus 1 site 5 units   2 sites (bilat) 10 units  Frontalis 4 sites (bilat) 20 units ( slight eyebrow droop on right, Botox shift up and lateral)  Temporalis 8 sites (bilat) 40 units  **Trapezius muscles 4 sites (bilat) 10 units (lateral and reduced)  Occipitalis 8 sites (bilat) 40 units  **Splenius capitus 2 sites (bilat) 10  units (lateral and reduced)  Masseter 2 sites (bilat) 20 units     A total of 155 units were injected, 45 units wasted.   The patient tolerated the injections well. I was present for and performed the entire procedure.      Noemy Nguyen MD

## 2024-10-08 NOTE — TELEPHONE ENCOUNTER
Medication Requested:  phentermine 30 MG capsule 30 capsule 1 8/6/2024 -- No   Sig - Route: TAKE ONE CAPSULE BY MOUTH EVERY MORNING - Oral     ----------------------  Last Office Visit : 6/20/2024  [facility] Office visit:    10/31/2024 9:30 AM (30 min)  [place]    Arrive by:  9:15 AM   [facility] RETURN   [facility] ([facility])   Ellyn Rivera MD     ----------------------        Refill decision: Refill pended and routed to the provider for review/determination due to the following criteria not met:     Controlled med request   None

## 2024-10-08 NOTE — LETTER
10/8/2024       RE: Ada Welch  [address] [address]     Dear Colleague,    Thank you for referring your patient, Ada Welch, to the [facility]. Please see a copy of my visit note below.    Botulinum Toxin Procedure Note     Ada Welch  [id number]     Ordering provider: Noemy Nguyen MD     The procedure was explained to the patient. Benefits of the treatment were discussed including headache and migraine reduction. Risks of the procedure were reviewed including but not limited to pain, bruising, bleeding, infection, weakness of muscles injected or those distal to injection. The patient voiced understanding of the risks and benefits. All questions answered and the patient consented to proceed.      Prior to receiving Botox injections the patient reported the following:  Baseline headache/migraine frequency: more than 15 days a month  Baseline headache/migraine duration: more than 4 hours  Associated migrainous features: wavy vision distortion, nausea, photophobia     Previous treatment trials:  amitriptyline, zonisamide, gabapentin, Aimovig, Emgality, duloxetine, tizanidine, propranolol     Last Botox procedure: 5/28/2024  Current migraine frequency: Typically more than 50% reduction in migraines. Has had a flare since having COVID for over 30 days.   Current migraine duration: 1 day     Functional improvements since starting botulinum toxin treatments: In addition she notes that she is able to be more functional and interactive with her family.      Last Neurology office visit: 2/17/2024, 5/21/2024     A 200 unit vial of Botox was diluted with 4ml of 0.9% sodium chloride     Botox lot # and expiration date: See MAR for details  0.9% sodium chloride lot # and expiration date: See MAR for details     The following muscles were injected using a 30 gauge needle:  Procerus 1 site 5  units   2 sites (bilat) 10 units  Frontalis 4 sites (bilat) 20 units ( slight eyebrow droop on right, Botox shift up and lateral)  Temporalis 8 sites (bilat) 40 units  **Trapezius muscles 4 sites (bilat) 10 units (lateral and reduced)  Occipitalis 8 sites (bilat) 40 units  **Splenius capitus 2 sites (bilat) 10 units (lateral and reduced)  Masseter 2 sites (bilat) 20 units     A total of 155 units were injected, 45 units wasted.   The patient tolerated the injections well. I was present for and performed the entire procedure.      Noemy Nguyen MD      Again, thank you for allowing me to participate in the care of your patient.      Sincerely,    Noemy Nguyen MD

## 2024-10-09 NOTE — TELEPHONE ENCOUNTER
ramelteon (ROZEREM) 8 MG tablet   Last Written Prescription Date:  4/25/2024  Last Fill Quantity: 30,   # refills: 3  Last Office Visit : 6/20/2024  Future Office visit:  10/31/2024  Routing ramelteon (ROZEREM) 8 MG tablet refill request to provider for review/approval because: Medication not on the refill protocol.

## 2024-10-10 ENCOUNTER — MYC MEDICAL ADVICE (OUTPATIENT)
Dept: ANESTHESIOLOGY | Facility: CLINIC | Age: 54
End: 2024-10-10
Payer: COMMERCIAL

## 2024-10-10 DIAGNOSIS — Q79.60 EHLERS-DANLOS SYNDROME: ICD-10-CM

## 2024-10-10 DIAGNOSIS — M54.12 CERVICAL RADICULOPATHY: ICD-10-CM

## 2024-10-10 DIAGNOSIS — M96.1 POSTLAMINECTOMY SYNDROME: Primary | ICD-10-CM

## 2024-10-10 RX ORDER — RAMELTEON 8 MG/1
8 TABLET ORAL AT BEDTIME
Qty: 30 TABLET | Refills: 5 | Status: SHIPPED | OUTPATIENT
Start: 2024-10-10

## 2024-10-10 RX ORDER — PHENTERMINE HYDROCHLORIDE 30 MG/1
30 CAPSULE ORAL EVERY MORNING
Qty: 30 CAPSULE | Refills: 2 | Status: SHIPPED | OUTPATIENT
Start: 2024-10-11

## 2024-10-11 NOTE — TELEPHONE ENCOUNTER
RN placed a new PT order for Teena Tl and faxed to the number provided by the patient.    Daisy Delarosa RNCC

## 2024-10-11 NOTE — TELEPHONE ENCOUNTER
PRIOR AUTHORIZATION DENIED    Medication: BUPRENORPHINE 10 MCG/HR TD PTWK  Insurance Company: [employer] - Phone [phone] Fax [phone]  Denial Date: 10/11/2024  Denial Reason(s):   Tier exeception is not available. Drug is covered, but patient is responsible for 50/50 copayment, which is $109.50 for copay, due to change in formulary as of 10/1/24. Formulary alternative is Methadone at $0 copay.    Appeal Information: n/a  Patient Notified: NO

## 2024-10-23 ENCOUNTER — MYC MEDICAL ADVICE (OUTPATIENT)
Dept: INTERNAL MEDICINE | Facility: CLINIC | Age: 54
End: 2024-10-23
Payer: COMMERCIAL

## 2024-10-23 DIAGNOSIS — M96.1 POSTLAMINECTOMY SYNDROME: ICD-10-CM

## 2024-10-23 RX ORDER — TRAMADOL HYDROCHLORIDE 50 MG/1
50 TABLET ORAL EVERY 6 HOURS PRN
Qty: 65 TABLET | Refills: 0 | Status: SHIPPED | OUTPATIENT
Start: 2024-10-23

## 2024-10-23 RX ORDER — CYCLOBENZAPRINE HCL 10 MG
10 TABLET ORAL 3 TIMES DAILY PRN
Qty: 60 TABLET | Refills: 0 | Status: SHIPPED | OUTPATIENT
Start: 2024-10-23

## 2024-10-23 NOTE — TELEPHONE ENCOUNTER
Chart reviewed - Patient of Dr. Faiza Martinez. Request appears appropriate.  checked, no concerns. Refilled for #65 tablets.     Jessica De Souza DNP, APRN, AGNP-C  [facility]

## 2024-10-23 NOTE — TELEPHONE ENCOUNTER
Refill request    Medication:   traMADol (ULTRAM) 50 MG tablet     cyclobenzaprine (FLEXERIL) 10 MG tablet     Sig:   traMADol- TAKE ONE TABLET BY MOUTH EVERY 6 HOURS AS NEEDED FOR PAIN    Cyclobenzaprine-TAKE ONE TABLET BY MOUTH THREE TIMES A DAY AS NEEDED FOR MUSCLE SPASM     Dispensed/Refills:  traMADol- 65/0    Cyclobenzaprine-60/0    SOLD to the pt on:   Last prescribed to patient: Cyclobenzaprine-10/4/24    traMADol-10/4/24     Last clinic appointment: 8/19/24  Next clinic appointment: none listed    Last Drug Screen Collected: 5/25/23  Controlled Substance Agreement signed: 5/25/23    Preferred pharmacy:    [facility] [address] [place] [address]    Refill request routed to the provider to review.

## 2024-10-29 ENCOUNTER — VIRTUAL VISIT (OUTPATIENT)
Dept: CARDIOLOGY | Facility: CLINIC | Age: 54
End: 2024-10-29
Attending: INTERNAL MEDICINE
Payer: COMMERCIAL

## 2024-10-29 VITALS — WEIGHT: 170 LBS | HEIGHT: 67 IN | BODY MASS INDEX: 26.68 KG/M2

## 2024-10-29 DIAGNOSIS — G90.9 AUTONOMIC DYSFUNCTION: ICD-10-CM

## 2024-10-29 DIAGNOSIS — J31.0 CHRONIC RHINITIS: ICD-10-CM

## 2024-10-29 DIAGNOSIS — Q79.60 EHLERS-DANLOS SYNDROME: ICD-10-CM

## 2024-10-29 DIAGNOSIS — G90.A POSTURAL ORTHOSTATIC TACHYCARDIA SYNDROME: ICD-10-CM

## 2024-10-29 DIAGNOSIS — R00.0 SINUS TACHYCARDIA: ICD-10-CM

## 2024-10-29 PROCEDURE — 99213 OFFICE O/P EST LOW 20 MIN: CPT | Performed by: INTERNAL MEDICINE

## 2024-10-29 RX ORDER — FLUTICASONE PROPIONATE 50 MCG
2 SPRAY, SUSPENSION (ML) NASAL DAILY
Qty: 48 G | Refills: 11 | Status: SHIPPED | OUTPATIENT
Start: 2024-10-29

## 2024-10-29 ASSESSMENT — PAIN SCALES - GENERAL: PAINLEVEL_OUTOF10: SEVERE PAIN (7)

## 2024-10-29 NOTE — PROGRESS NOTES
Virtual Visit Details    Type of service:  Telephone Visit     HPI:     Ada is a 53-year-old woman with complex medical history including autonomic dysfunction which markedly impact her quality of life for many years.  Her symptoms from a cardiovascular perspective have included rapid heart beating but in recent times that seems to have been improved with ivabradine.  Of interest, she has recently run out of ivabradine but her symptoms seem to be adequately controlled with recent addition of Wegovy.  Her medications are summarized below.    At today's visit she is not complaining of rapid heart beating although she points out that occasionally she will have flares.  Thermoregulatory issues seem to be adequately controlled.    Based on her symptomatic status being relatively stable I recommended that she forego ivabradine in the near future and she stick to the drug regimen  as initiated.  Ada voiced understanding and agreement.  We will reassess in 6 to 8 months time.    PAST MEDICAL HISTORY:  Past Medical History:   Diagnosis Date    Allergic rhinitis 09/2007    Anxiety     Arthritis 11/01/2013    Found during search for cause of back pain    Autoimmune disease (H) 2016    Immunosuppresive    Autonomic dysfunction     Bleeding disorder (H) 2016    Bruise easily    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    Cervicalgia     Chronic sinusitis 00/2007    Diagnosed with chronic pansinusitis 2016    Coagulation disorder (H)     factor 8    CSF leak     Chiari malformation    Depression     Tom-Danlos disease     Eosinophilic esophagitis 08/2018    Gastroesophageal reflux disease 3/1/2017    I have large hiatal hernia    Hiatal hernia 2016    Have adeline hiatel hernia    Hoarseness Unsure    It comes and goes    Hypertension     Hypothyroid     IBS (irritable bowel syndrome) with constipation     improved on Linzess    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/2015    Common with Tom Danlos  Syndrome    Irregular heart beat     Migraines 1/1/2007    Unsure of exact date    Nasal polyps 2013    Were revoved 03/2017, benign    Orthostatic hypotension     Other nervous system complications 1/2014    Autonomic nervous system disorder, neuralgia, neuropathy    Swelling, mass, or lump in head and neck 08/2016    Lymph node has been growing for last year    Thyroid disease 01/01/2008    Urinary incontinence     Urinary urgency        CURRENT MEDICATIONS:  Current Outpatient Medications   Medication Sig Dispense Refill    acetaminophen (TYLENOL) 500 MG tablet Take 1,000 mg by mouth every 6 hours as needed for mild pain      albuterol (PROAIR HFA/PROVENTIL HFA/VENTOLIN HFA) 108 (90 Base) MCG/ACT inhaler Inhale 2 puffs into the lungs every 6 hours as needed for shortness of breath, wheezing or cough. 18 g 0    atorvastatin (LIPITOR) 20 MG tablet TAKE ONE TABLET BY MOUTH ONCE DAILY 90 tablet 3    benzonatate (TESSALON) 100 MG capsule Take 1 capsule (100 mg) by mouth 3 times daily as needed for cough. 30 capsule 0    benzonatate (TESSALON) 200 MG capsule Take 1 capsule (200 mg) by mouth 3 times daily as needed for cough. 45 capsule 0    buprenorphine (BUTRANS) 10 MCG/HR WK patch Place onto the skin once a week. 4 patch 1    butalbital-acetaminophen-caffeine (ESGIC) -40 MG tablet Take 1 tablet by mouth every 6 hours as needed for headaches 10 tablet 0    celecoxib (CELEBREX) 100 MG capsule TAKE ONE CAPSULE BY MOUTH TWICE A DAY AS NEEDED FOR MODERATE PAIN. TAKE WITH FOOD 60 capsule 2    cetirizine (ZYRTEC) 10 MG tablet Take 10 mg by mouth daily as needed       co-enzyme Q-10 100 MG CAPS capsule Take 100 mg by mouth daily      cyclobenzaprine (FLEXERIL) 10 MG tablet TAKE ONE TABLET BY MOUTH THREE TIMES A DAY AS NEEDED FOR MUSCLE SPASMS 60 tablet 0    DULoxetine (CYMBALTA) 30 MG capsule TAKE 2 CAPSULES BY MOUTH IN THE MORNING AND 1 CAPSULE BY MOUTH AT BEDTIME 270 capsule 1    EPINEPHrine (ANY BX GENERIC EQUIV)  0.3 MG/0.3ML injection 2-pack Inject 0.3 mLs (0.3 mg) into the muscle as needed for anaphylaxis May repeat one time in 5-15 minutes if response to initial dose is inadequate. 2 each 1    ferrous fumarate 65 mg, [place]. FE,-Vitamin C 125 mg (VITRON-C)  MG TABS tablet Take 1 tablet by mouth every other day 60 tablet 4    fluticasone (FLONASE) 50 MCG/ACT nasal spray SPRAY 2 SPRAYS INTO BOTH NOSTRILS DAILY 48 g 11    guanFACINE (TENEX) 1 MG tablet TAKE ONE TAB BY MOUTH ONCE NIGHTLY AT BEDTIME      HYDROmorphone (DILAUDID) 2 MG tablet Take 1 tablet (2 mg) by mouth 2 times daily 15 tablet 0    hydrOXYzine HCl (ATARAX) 25 MG tablet TAKE 1 TABLET (25 MG) BY MOUTH EVERY 6 HOURS AS NEEDED FOR ANXIETY 180 tablet 1    ibuprofen ([employer] IBUPROFEN) 200 MG capsule Take 600 mg by mouth every 4 hours as needed for fever      ivabradine (CORLANOR) 7.5 MG tablet Take 1 tablet (7.5 mg) by mouth 2 times daily 180 tablet 3    ketorolac (TORADOL) 10 MG tablet Take 1 tablet (10 mg) by mouth every 6 hours as needed for moderate pain. 6 tablet 0    Lidocaine (LIDOCARE) 4 % Patch Place 1-3 patches onto the skin every 24 hours To prevent lidocaine toxicity, patient should be patch free for 12 hrs daily. 20 patch 0    medical cannabis (Patient's own supply.  Not a prescription) Take 1 Dose by mouth See Admin Instructions Capsule formulation - uses as needed      methocarbamol (ROBAXIN) 500 MG tablet TAKE 1 TABLET (500 MG) BY MOUTH 4 TIMES DAILY 30 tablet 0    metoclopramide (REGLAN) 10 MG tablet Take 1 tablet (10 mg) by mouth 4 times daily as needed (headache) 40 tablet 3    metoclopramide (REGLAN) 5 MG tablet Take 1 tablet (5 mg) by mouth 3 times daily as needed (migraine). 12 tablet 0    Multiple Vitamins-Minerals (MULTIVITAMIN PO) Take 1 tablet by mouth daily       naloxone (NARCAN) 4 MG/0.1ML nasal spray Spray 1 spray (4 mg) into one nostril alternating nostrils once as needed for opioid reversal every 2-3 minutes until assistance arrives  0.2 mL 0    phentermine 30 MG capsule Take 1 capsule (30 mg) by mouth every morning. 30 capsule 2    Probiotic Product (PROBIOTIC DAILY PO) Take 6 packets by mouth every morning Five-Lac      propranolol ER (INDERAL LA) 120 MG 24 hr capsule Take 1 capsule (120 mg) by mouth daily 90 capsule 3    ramelteon (ROZEREM) 8 MG tablet Take 1 tablet (8 mg) by mouth at bedtime. 30 tablet 5    rizatriptan (MAXALT-MLT) 5 MG ODT TAKE 1-2 TABLETS (5-10 MG) BY MOUTH AT ONSET OF HEADACHE FOR MIGRAINE (MAX 30 MG IN 24 HOURS) 18 tablet 11    Semaglutide-Weight Management (WEGOVY) 1 MG/0.5ML pen Inject 1 mg subcutaneously once a week. 2 mL 1    Semaglutide-Weight Management (WEGOVY) 1.7 MG/0.75ML pen Inject 1.7 mg subcutaneously once a week. 3 mL 0    SUMAtriptan (IMITREX STATDOSE) 6 MG/0.5ML pen injector kit Inject 0.5 ml (6 mg) subcutaneously at onset of migraine, May repeat in 1 hour , Max 12 mg/24 hrs 2 kit 11    topiramate (TOPAMAX) 100 MG tablet Take 1 tablet (100 mg) by mouth daily 90 tablet 3    traMADol (ULTRAM) 50 MG tablet TAKE ONE TABLET BY MOUTH EVERY 6 HOURS AS NEEDED FOR PAIN 65 tablet 0    vitamin B complex with vitamin C (STRESS TAB) tablet Take 1 tablet by mouth daily      VITAMIN D, CHOLECALCIFEROL, PO Take 2,000 Units by mouth daily      WEGOVY 0.5 MG/0.5ML pen INJECT 0.5 MG UNDER THE SKIN ONCE A WEEK 2 mL 0       PAST SURGICAL HISTORY:  Past Surgical History:   Procedure Laterality Date    AS REPAIR OF NASAL SEPTUM  2009    repair broke nose    BACK SURGERY  12/09/2013  10/01/2014    Spinal fusion L4-S1, L5 moving 5/8ths in. Remove screws/rods, Dr. Vega    BIOPSY  01/01/2008 & 3/2017    mole biopsy, lymph node biopsy    COLONOSCOPY  03/2017    Colonoscopy and GI    EPIDURAL BLOOD PATCH (PP) N/A 7/2/2024    Procedure: EPIDURAL BLOOD PATCH (PP);  Surgeon: Clark Eaton MD;  Location: [facility] OR    ESOPHAGEAL IMPEDENCE FUNCTION TEST WITH 24 HOUR PH GREATER THAN 1 HOUR N/A 09/17/2019    Procedure: IMPEDANCE PH  STUDY, ESOPHAGUS, 24 HOUR;  Surgeon: Raghavendra Grande MD;  Location: [facility]    ESOPHAGOSCOPY, GASTROSCOPY, DUODENOSCOPY (EGD), COMBINED N/A 08/03/2018    Procedure: COMBINED ESOPHAGOSCOPY, GASTROSCOPY, DUODENOSCOPY (EGD), BIOPSY SINGLE OR MULTIPLE;;  Surgeon: Juan Woodson MD;  Location: [facility]    ESOPHAGOSCOPY, GASTROSCOPY, DUODENOSCOPY (EGD), COMBINED N/A 10/22/2018    Procedure: COMBINED ESOPHAGOSCOPY, GASTROSCOPY, DUODENOSCOPY (EGD), BIOPSY SINGLE OR MULTIPLE;  Surgeon: Sadia Carolina MD;  Location: [facility]    ESOPHAGOSCOPY, GASTROSCOPY, DUODENOSCOPY (EGD), COMBINED N/A 12/17/2018    Procedure: COMBINED ESOPHAGOSCOPY, GASTROSCOPY, DUODENOSCOPY (EGD);  Surgeon: Juan Woodson MD;  Location: [facility]    ESOPHAGOSCOPY, GASTROSCOPY, DUODENOSCOPY (EGD), COMBINED N/A 10/14/2022    Procedure: ESOPHAGOGASTRODUODENOSCOPY, WITH BIOPSY;  Surgeon: Jay Shaffer MD;  Location: [facility] OR    INJECT BLOCK MEDIAL BRANCH CERVICAL/THORACIC/LUMBAR Left 09/24/2020    Procedure: BLOCK, NERVE, FACET JOINT, MEDIAL BRANCH, DIAGNOSTIC;  Surgeon: Faiza Martinez MD;  [facility]    INJECT BLOCK MEDIAL BRANCH CERVICAL/THORACIC/LUMBAR Bilateral 10/08/2020    Procedure: Bilateral cervical 3, cervical 4, and cervical 5 medial branch nerve block;  Surgeon: Faiza Martinez MD;  Location: [facility]    INJECT BLOCK MEDIAL BRANCH CERVICAL/THORACIC/LUMBAR Right 03/04/2021    Procedure: Cervical 3, cervical 4, cervical 5 medial branch nerve blocks to target C3-C4 and C4-C5 facet joint;  Surgeon: Faiza Martinez MD;  Location: [facility] OR    INJECT EPIDURAL CERVICAL N/A 10/27/2022    Procedure: INJECTION, SPINE, CERVICAL, EPIDURAL, C7-T1;  Surgeon: Faiza Martinez MD;  Location: [facility]    INJECT EPIDURAL CERVICAL Bilateral 03/30/2023    Procedure: INJECTION, SPINE, CERVICAL C7-T1, EPIDURAL;  Surgeon: Faiza Martinez MD;  Location: [facility]    INJECT EPIDURAL LUMBAR Left 06/08/2023    Procedure: INJECTION, SPINE, LUMBAR, EPIDURAL;  Surgeon: Juan  Faiza OLIVEIRA MD;  Location: [facility]    INJECT EPIDURAL LUMBAR Bilateral 11/30/2023    Procedure: Interlaminar lumbar epidural steroid injection (L3-L4);  Surgeon: Faiza Martinez MD;  Location: [facility]    INJECT JOINT SACROILIAC Bilateral 06/08/2023    Procedure: INJECT JOINT SACROILIAC (bilateral);  Surgeon: Faiza Martinez MD;  Location: [facility]    INJECT TRIGGER POINT SINGLE / MULTIPLE 1 OR 2 MUSCLES Bilateral 2/8/2024    Procedure: Bilateral piriformis muscle injection under x-ray guidance;  Surgeon: Faiza Martinez MD;  Location: [facility]    LAPAROSCOPIC HERNIORRHAPHY HIATAL N/A 02/10/2020    Procedure: LAPAROSCOPIC HIATAL HERNIA REPAIR, NISSEN FUNDOPLICATION, ESOPHOGASTRODUODENOSCOPY, PEG TUBE PLACEMENT.;  Surgeon: Alana Mack MD;  Location: [facility]    NASAL/SINUS POLYPECTOMY  2017    Polyps were benign    NOSE SURGERY  2007    deviated septum    TONSILLECTOMY & ADENOIDECTOMY  1981    TUBAL LIGATION  07/01/2011       ALLERGIES:     Allergies   Allergen Reactions    Baclofen Other (See Comments)     Loss of muscle tone. Muscle weakness.    Bee Venom Shortness Of Breath, Palpitations, Anaphylaxis and Difficulty breathing     Patient does carry Epi-Pen    Chicken-Derived Products (Egg) Nausea and Diarrhea    Compazine [Prochlorperazine]      Extreme jittery    Food      Milk    Gabapentin      Dizzy    Gluten Meal GI Disturbance     Wheat bran and wheat    Pregabalin     Seasonal Allergies      Dust, mold       FAMILY HISTORY:  Family History   Problem Relation Age of Onset    Connective Tissue Disorder Mother         eds    Thyroid Disease Mother     Cancer Father         Malignant tumor on base of spine that grew into spinal column & brain    Migraines Father     Diabetes Maternal Grandmother         Diagnosed later in life    Heart Disease Maternal Grandmother     Hypertension Maternal Grandmother     Diabetes Paternal Grandmother         Elderly diabetes    Cancer Paternal Grandmother         Uterine & Ovarian  "cancer    Diabetes Paternal Grandfather         Diagnosed later in life    Cancer Paternal Grandfather         Prostate cancer that spread to colon and lungs    Connective Tissue Disorder Sister         eds    Asthma Sister         Pediatric Asthma    Migraines Sister     Thyroid Disease Sister     Thyroid Disease Sister         ,    Migraines Sister     Migraines Sister     Thyroid Disease Sister     Asthma Son         Pediatric Asthma    Breast Cancer No family hx of     Glaucoma No family hx of     Macular Degeneration No family hx of      SOCIAL HISTORY:  Social History     Tobacco Use    Smoking status: Former     Current packs/day: 0.00     Average packs/day: 0.2 packs/day for 22.9 years (4.6 ttl pk-yrs)     Types: Cigarettes     Start date: 1/1/1991     Quit date: 12/1/2013     Years since quitting: 10.9     Passive exposure: Past    Smokeless tobacco: Never   Vaping Use    Vaping status: Never Used   Substance Use Topics    Alcohol use: Yes     Comment: Occasionally    Drug use: Yes     Types: Marijuana     Comment: medical marijuana       ROS:   Constitutional: No fever, chills, or sweats. Weight stable.   ENT: No visual disturbance, ear ache, epistaxis, sore throat.   Cardiovascular: As per HPI.   Respiratory: No cough, hemoptysis.    GI: No nausea, vomiting,Integument: Negative.   Psychiatric: Negative.   Hematologic:   no easy bleeding.  Neuro: Negative.   Endocrinology: No significant heat or cold intolerance   Musculoskeletal: No myalgia.    Exam:  Ht 1.702 m (5' 7\")   Wt 77.1 kg (170 lb)   LMP  (LMP Unknown)   BMI 26.63 kg/m    GENERAL, alert and no distress  RESPIRATORY: lno rales, rhonchi or wheezes, no use of accessory muscles, no retractions, respirations are unlabored, normal respiratory rate  NEURO: alert and oriented to person/place/time, normal speech,  SKIN: no ecchymoses, no rashes    Labs:  CBC RESULTS:   Lab Results   Component Value Date    WBC 7.1 06/23/2024    WBC 6.2 11/13/2020    " RBC 4.08 06/23/2024    RBC 4.42 11/13/2020    HGB 12.7 06/23/2024    HGB 12.1 11/13/2020    HCT 38.0 06/23/2024    HCT 38.3 11/13/2020    MCV 93 06/23/2024    MCV 87 11/13/2020    MCH 31.1 06/23/2024    MCH 27.4 11/13/2020    MCHC 33.4 06/23/2024    MCHC 31.6 11/13/2020    RDW 12.9 06/23/2024    RDW 14.6 11/13/2020     06/23/2024     11/13/2020       BMP RESULTS:  Lab Results   Component Value Date     06/23/2024     11/13/2020    POTASSIUM 4.0 06/23/2024    POTASSIUM 4.5 11/13/2020    CHLORIDE 106 06/23/2024    CHLORIDE 102 11/13/2020    CO2 23 06/23/2024    CO2 29 11/13/2020    ANIONGAP 8 06/23/2024    ANIONGAP 4 11/13/2020    GLC 88 06/23/2024    GLC 96 11/13/2020    BUN 14.3 06/23/2024    BUN 10 11/13/2020    CR 0.93 06/23/2024    CR 0.83 11/13/2020    GFRESTIMATED 74 06/23/2024    GFRESTIMATED >60 09/15/2023    GFRESTIMATED 83 11/13/2020    GFRESTBLACK >90 11/13/2020    JUAN 8.6 06/23/2024    JUAN 8.6 11/13/2020        INR RESULTS:  Lab Results   Component Value Date    INR 0.93 01/30/2020    INR 0.97 02/28/2019    INR 0.90 12/17/2018    INR 0.96 07/06/2018       Procedures:  PULMONARY FUNCTION TESTS:        No data to display                  ECHOCARDIOGRAM:   No results found for this or any previous visit (from the past 8760 hours).      Assessment and Plan:   1.  Autonomic dysfunction under adequate current control with medication regimen described above.    Plan  1.  Patient to hold use of ivabradine for the time being and continue medications as currently listed  2.  Follow-up video visit in 6 to 8 months    Total elapsed time today with chart review, clinic visit and documentation 20 minutes    Telephone on 5: Oh 5 PM; off 5: 20 p.m.  Platform [employer]  Patient at home; clinic [facility] heart    I very much appreciated the opportunity to see and assess Ada Welch in the clinic today Please do not hesitate to contact my office if you have any questions or concerns.      Kirk CRABTREE  Drew REILLY  Cardiac Arrhythmia Service  [facility]  [phone]       CC  KOREY LAURENT

## 2024-10-29 NOTE — LETTER
10/29/2024      RE: Ada Welch  [address] [facility] [address]       Dear Colleague,    Thank you for the opportunity to participate in the care of your patient, Ada Welch, at the [facility] [place] [facility]. Please see a copy of my visit note below.    Virtual Visit Details    Type of service:  Telephone Visit     HPI:     Ada is a 53-year-old woman with complex medical history including autonomic dysfunction which markedly impact her quality of life for many years.  Her symptoms from a cardiovascular perspective have included rapid heart beating but in recent times that seems to have been improved with ivabradine.  Of interest, she has recently run out of ivabradine but her symptoms seem to be adequately controlled with recent addition of Wegovy.  Her medications are summarized below.    At today's visit she is not complaining of rapid heart beating although she points out that occasionally she will have flares.  Thermoregulatory issues seem to be adequately controlled.    Based on her symptomatic status being relatively stable I recommended that she forego ivabradine in the near future and she stick to the drug regimen  as initiated.  Ada voiced understanding and agreement.  We will reassess in 6 to 8 months time.    PAST MEDICAL HISTORY:  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     Cervicalgia      Chronic sinusitis 00/2007    Diagnosed with chronic pansinusitis 2016     Coagulation disorder (H)     factor 8     CSF leak     Chiari malformation     Depression      Tom-Danlos disease       Eosinophilic esophagitis 08/2018     Gastroesophageal reflux disease 3/1/2017    I have large hiatal hernia     Hiatal hernia 2016    Have adeline hiatel hernia     Hoarseness Unsure    It comes and goes     Hypertension      Hypothyroid      IBS (irritable bowel syndrome) with constipation     improved on Linzess     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     Irregular heart beat      Migraines 1/1/2007    Unsure of exact date     Nasal polyps 2013    Were revoved 03/2017, benign     Orthostatic hypotension      Other nervous system complications 1/2014    Autonomic nervous system disorder, neuralgia, neuropathy     Swelling, mass, or lump in head and neck 08/2016    Lymph node has been growing for last year     Thyroid disease 01/01/2008     Urinary incontinence      Urinary urgency        CURRENT MEDICATIONS:  Current Outpatient Medications   Medication Sig Dispense Refill     acetaminophen (TYLENOL) 500 MG tablet Take 1,000 mg by mouth every 6 hours as needed for mild pain       albuterol (PROAIR HFA/PROVENTIL HFA/VENTOLIN HFA) 108 (90 Base) MCG/ACT inhaler Inhale 2 puffs into the lungs every 6 hours as needed for shortness of breath, wheezing or cough. 18 g 0     atorvastatin (LIPITOR) 20 MG tablet TAKE ONE TABLET BY MOUTH ONCE DAILY 90 tablet 3     benzonatate (TESSALON) 100 MG capsule Take 1 capsule (100 mg) by mouth 3 times daily as needed for cough. 30 capsule 0     benzonatate (TESSALON) 200 MG capsule Take 1 capsule (200 mg) by mouth 3 times daily as needed for cough. 45 capsule 0     buprenorphine (BUTRANS) 10 MCG/HR WK patch Place onto the skin once a week. 4 patch 1     butalbital-acetaminophen-caffeine (ESGIC) -40 MG tablet Take 1 tablet by mouth every 6 hours as needed for headaches 10 tablet 0     celecoxib (CELEBREX) 100 MG capsule TAKE ONE CAPSULE BY MOUTH TWICE A DAY AS NEEDED FOR MODERATE PAIN. TAKE WITH FOOD 60 capsule 2     cetirizine (ZYRTEC) 10 MG tablet Take  10 mg by mouth daily as needed        co-enzyme Q-10 100 MG CAPS capsule Take 100 mg by mouth daily       cyclobenzaprine (FLEXERIL) 10 MG tablet TAKE ONE TABLET BY MOUTH THREE TIMES A DAY AS NEEDED FOR MUSCLE SPASMS 60 tablet 0     DULoxetine (CYMBALTA) 30 MG capsule TAKE 2 CAPSULES BY MOUTH IN THE MORNING AND 1 CAPSULE BY MOUTH AT BEDTIME 270 capsule 1     EPINEPHrine (ANY BX GENERIC EQUIV) 0.3 MG/0.3ML injection 2-pack Inject 0.3 mLs (0.3 mg) into the muscle as needed for anaphylaxis May repeat one time in 5-15 minutes if response to initial dose is inadequate. 2 each 1     ferrous fumarate 65 mg, [place]. FE,-Vitamin C 125 mg (VITRON-C)  MG TABS tablet Take 1 tablet by mouth every other day 60 tablet 4     fluticasone (FLONASE) 50 MCG/ACT nasal spray SPRAY 2 SPRAYS INTO BOTH NOSTRILS DAILY 48 g 11     guanFACINE (TENEX) 1 MG tablet TAKE ONE TAB BY MOUTH ONCE NIGHTLY AT BEDTIME       HYDROmorphone (DILAUDID) 2 MG tablet Take 1 tablet (2 mg) by mouth 2 times daily 15 tablet 0     hydrOXYzine HCl (ATARAX) 25 MG tablet TAKE 1 TABLET (25 MG) BY MOUTH EVERY 6 HOURS AS NEEDED FOR ANXIETY 180 tablet 1     ibuprofen ([employer] IBUPROFEN) 200 MG capsule Take 600 mg by mouth every 4 hours as needed for fever       ivabradine (CORLANOR) 7.5 MG tablet Take 1 tablet (7.5 mg) by mouth 2 times daily 180 tablet 3     ketorolac (TORADOL) 10 MG tablet Take 1 tablet (10 mg) by mouth every 6 hours as needed for moderate pain. 6 tablet 0     Lidocaine (LIDOCARE) 4 % Patch Place 1-3 patches onto the skin every 24 hours To prevent lidocaine toxicity, patient should be patch free for 12 hrs daily. 20 patch 0     medical cannabis (Patient's own supply.  Not a prescription) Take 1 Dose by mouth See Admin Instructions Capsule formulation - uses as needed       methocarbamol (ROBAXIN) 500 MG tablet TAKE 1 TABLET (500 MG) BY MOUTH 4 TIMES DAILY 30 tablet 0     metoclopramide (REGLAN) 10 MG tablet Take 1 tablet (10 mg) by mouth 4 times daily as  needed (headache) 40 tablet 3     metoclopramide (REGLAN) 5 MG tablet Take 1 tablet (5 mg) by mouth 3 times daily as needed (migraine). 12 tablet 0     Multiple Vitamins-Minerals (MULTIVITAMIN PO) Take 1 tablet by mouth daily        naloxone (NARCAN) 4 MG/0.1ML nasal spray Spray 1 spray (4 mg) into one nostril alternating nostrils once as needed for opioid reversal every 2-3 minutes until assistance arrives 0.2 mL 0     phentermine 30 MG capsule Take 1 capsule (30 mg) by mouth every morning. 30 capsule 2     Probiotic Product (PROBIOTIC DAILY PO) Take 6 packets by mouth every morning Five-Lac       propranolol ER (INDERAL LA) 120 MG 24 hr capsule Take 1 capsule (120 mg) by mouth daily 90 capsule 3     ramelteon (ROZEREM) 8 MG tablet Take 1 tablet (8 mg) by mouth at bedtime. 30 tablet 5     rizatriptan (MAXALT-MLT) 5 MG ODT TAKE 1-2 TABLETS (5-10 MG) BY MOUTH AT ONSET OF HEADACHE FOR MIGRAINE (MAX 30 MG IN 24 HOURS) 18 tablet 11     Semaglutide-Weight Management (WEGOVY) 1 MG/0.5ML pen Inject 1 mg subcutaneously once a week. 2 mL 1     Semaglutide-Weight Management (WEGOVY) 1.7 MG/0.75ML pen Inject 1.7 mg subcutaneously once a week. 3 mL 0     SUMAtriptan (IMITREX STATDOSE) 6 MG/0.5ML pen injector kit Inject 0.5 ml (6 mg) subcutaneously at onset of migraine, May repeat in 1 hour , Max 12 mg/24 hrs 2 kit 11     topiramate (TOPAMAX) 100 MG tablet Take 1 tablet (100 mg) by mouth daily 90 tablet 3     traMADol (ULTRAM) 50 MG tablet TAKE ONE TABLET BY MOUTH EVERY 6 HOURS AS NEEDED FOR PAIN 65 tablet 0     vitamin B complex with vitamin C (STRESS TAB) tablet Take 1 tablet by mouth daily       VITAMIN D, CHOLECALCIFEROL, PO Take 2,000 Units by mouth daily       WEGOVY 0.5 MG/0.5ML pen INJECT 0.5 MG UNDER THE SKIN ONCE A WEEK 2 mL 0       PAST SURGICAL HISTORY:  Past Surgical History:   Procedure Laterality Date     AS REPAIR OF NASAL SEPTUM  2009    repair broke nose     BACK SURGERY  12/09/2013  10/01/2014    Spinal fusion  L4-S1, L5 moving 5/8ths in. Remove screws/rods, Dr. Vega     BIOPSY  01/01/2008 & 3/2017    mole biopsy, lymph node biopsy     COLONOSCOPY  03/2017    Colonoscopy and GI     EPIDURAL BLOOD PATCH (PP) N/A 7/2/2024    Procedure: EPIDURAL BLOOD PATCH (PP);  Surgeon: Clark Eaton MD;  Location: [facility] OR     ESOPHAGEAL IMPEDENCE FUNCTION TEST WITH 24 HOUR PH GREATER THAN 1 HOUR N/A 09/17/2019    Procedure: IMPEDANCE PH STUDY, ESOPHAGUS, 24 HOUR;  Surgeon: Raghavendra Grande MD;  Location: [facility]     ESOPHAGOSCOPY, GASTROSCOPY, DUODENOSCOPY (EGD), COMBINED N/A 08/03/2018    Procedure: COMBINED ESOPHAGOSCOPY, GASTROSCOPY, DUODENOSCOPY (EGD), BIOPSY SINGLE OR MULTIPLE;;  Surgeon: Juan Woodson MD;  Location: [facility]     ESOPHAGOSCOPY, GASTROSCOPY, DUODENOSCOPY (EGD), COMBINED N/A 10/22/2018    Procedure: COMBINED ESOPHAGOSCOPY, GASTROSCOPY, DUODENOSCOPY (EGD), BIOPSY SINGLE OR MULTIPLE;  Surgeon: Sadia Carolina MD;  Location: [facility]     ESOPHAGOSCOPY, GASTROSCOPY, DUODENOSCOPY (EGD), COMBINED N/A 12/17/2018    Procedure: COMBINED ESOPHAGOSCOPY, GASTROSCOPY, DUODENOSCOPY (EGD);  Surgeon: Juan Woodson MD;  Location:  GI     ESOPHAGOSCOPY, GASTROSCOPY, DUODENOSCOPY (EGD), COMBINED N/A 10/14/2022    Procedure: ESOPHAGOGASTRODUODENOSCOPY, WITH BIOPSY;  Surgeon: Jay Shaffer MD;  Location: [facility] OR     INJECT BLOCK MEDIAL BRANCH CERVICAL/THORACIC/LUMBAR Left 09/24/2020    Procedure: BLOCK, NERVE, FACET JOINT, MEDIAL BRANCH, DIAGNOSTIC;  Surgeon: Faiza Martinez MD;  [facility]     INJECT BLOCK MEDIAL BRANCH CERVICAL/THORACIC/LUMBAR Bilateral 10/08/2020    Procedure: Bilateral cervical 3, cervical 4, and cervical 5 medial branch nerve block;  Surgeon: Faiza Martinez MD;  Location: [facility] OR     INJECT BLOCK MEDIAL BRANCH CERVICAL/THORACIC/LUMBAR Right 03/04/2021    Procedure: Cervical 3, cervical 4, cervical 5 medial branch nerve blocks to target C3-C4 and C4-C5 facet joint;  Surgeon: Faiza Martinez MD;   Location: [facility] OR     INJECT EPIDURAL CERVICAL N/A 10/27/2022    Procedure: INJECTION, SPINE, CERVICAL, EPIDURAL, C7-T1;  Surgeon: Faiza Martinez MD;  Location: [facility] OR     INJECT EPIDURAL CERVICAL Bilateral 03/30/2023    Procedure: INJECTION, SPINE, CERVICAL C7-T1, EPIDURAL;  Surgeon: Faiza Martinez MD;  Location: [facility] OR     INJECT EPIDURAL LUMBAR Left 06/08/2023    Procedure: INJECTION, SPINE, LUMBAR, EPIDURAL;  Surgeon: Faiza Martinez MD;  Location: [facility] OR     INJECT EPIDURAL LUMBAR Bilateral 11/30/2023    Procedure: Interlaminar lumbar epidural steroid injection (L3-L4);  Surgeon: Faiza Martinez MD;  Location: [facility] OR     INJECT JOINT SACROILIAC Bilateral 06/08/2023    Procedure: INJECT JOINT SACROILIAC (bilateral);  Surgeon: Faiza Martinez MD;  Location: [facility] OR     INJECT TRIGGER POINT SINGLE / MULTIPLE 1 OR 2 MUSCLES Bilateral 2/8/2024    Procedure: Bilateral piriformis muscle injection under x-ray guidance;  Surgeon: Faiza Martinez MD;  Location: [facility] OR     LAPAROSCOPIC HERNIORRHAPHY HIATAL N/A 02/10/2020    Procedure: LAPAROSCOPIC HIATAL HERNIA REPAIR, NISSEN FUNDOPLICATION, ESOPHOGASTRODUODENOSCOPY, PEG TUBE PLACEMENT.;  Surgeon: Alana Mack MD;  Location: [facility]     NASAL/SINUS POLYPECTOMY  2017    Polyps were benign     NOSE SURGERY  2007    deviated septum     TONSILLECTOMY & ADENOIDECTOMY  1981     TUBAL LIGATION  07/01/2011       ALLERGIES:     Allergies   Allergen Reactions     Baclofen Other (See Comments)     Loss of muscle tone. Muscle weakness.     Bee Venom Shortness Of Breath, Palpitations, Anaphylaxis and Difficulty breathing     Patient does carry Epi-Pen     Chicken-Derived Products (Egg) Nausea and Diarrhea     Compazine [Prochlorperazine]      Extreme jittery     Food      Milk     Gabapentin      Dizzy     Gluten Meal GI Disturbance     Wheat bran and wheat     Pregabalin      Seasonal Allergies      Dust, mold       FAMILY HISTORY:  Family History   Problem Relation Age of  Onset     Connective Tissue Disorder Mother         eds     Thyroid Disease Mother      Cancer Father         Malignant tumor on base of spine that grew into spinal column & brain     Migraines Father      Diabetes Maternal Grandmother         Diagnosed later in life     Heart Disease Maternal Grandmother      Hypertension Maternal Grandmother      Diabetes Paternal Grandmother         Elderly diabetes     Cancer Paternal Grandmother         Uterine & Ovarian cancer     Diabetes Paternal Grandfather         Diagnosed later in life     Cancer Paternal Grandfather         Prostate cancer that spread to colon and lungs     Connective Tissue Disorder Sister         eds     Asthma Sister         Pediatric Asthma     Migraines Sister      Thyroid Disease Sister      Thyroid Disease Sister         ,     Migraines Sister      Migraines Sister      Thyroid Disease Sister      Asthma Son         Pediatric Asthma     Breast Cancer No family hx of      Glaucoma No family hx of      Macular Degeneration No family hx of      SOCIAL HISTORY:  Social History     Tobacco Use     Smoking status: Former     Current packs/day: 0.00     Average packs/day: 0.2 packs/day for 22.9 years (4.6 ttl pk-yrs)     Types: Cigarettes     Start date: 1/1/1991     Quit date: 12/1/2013     Years since quitting: 10.9     Passive exposure: Past     Smokeless tobacco: Never   Vaping Use     Vaping status: Never Used   Substance Use Topics     Alcohol use: Yes     Comment: Occasionally     Drug use: Yes     Types: Marijuana     Comment: medical marijuana       ROS:   Constitutional: No fever, chills, or sweats. Weight stable.   ENT: No visual disturbance, ear ache, epistaxis, sore throat.   Cardiovascular: As per HPI.   Respiratory: No cough, hemoptysis.    GI: No nausea, vomiting,Integument: Negative.   Psychiatric: Negative.   Hematologic:   no easy bleeding.  Neuro: Negative.   Endocrinology: No significant heat or cold intolerance   Musculoskeletal:  "No myalgia.    Exam:  Ht 1.702 m (5' 7\")   Wt 77.1 kg (170 lb)   LMP  (LMP Unknown)   BMI 26.63 kg/m    GENERAL, alert and no distress  RESPIRATORY: lno rales, rhonchi or wheezes, no use of accessory muscles, no retractions, respirations are unlabored, normal respiratory rate  NEURO: alert and oriented to person/place/time, normal speech,  SKIN: no ecchymoses, no rashes    Labs:  CBC RESULTS:   Lab Results   Component Value Date    WBC 7.1 06/23/2024    WBC 6.2 11/13/2020    RBC 4.08 06/23/2024    RBC 4.42 11/13/2020    HGB 12.7 06/23/2024    HGB 12.1 11/13/2020    HCT 38.0 06/23/2024    HCT 38.3 11/13/2020    MCV 93 06/23/2024    MCV 87 11/13/2020    MCH 31.1 06/23/2024    MCH 27.4 11/13/2020    MCHC 33.4 06/23/2024    MCHC 31.6 11/13/2020    RDW 12.9 06/23/2024    RDW 14.6 11/13/2020     06/23/2024     11/13/2020       BMP RESULTS:  Lab Results   Component Value Date     06/23/2024     11/13/2020    POTASSIUM 4.0 06/23/2024    POTASSIUM 4.5 11/13/2020    CHLORIDE 106 06/23/2024    CHLORIDE 102 11/13/2020    CO2 23 06/23/2024    CO2 29 11/13/2020    ANIONGAP 8 06/23/2024    ANIONGAP 4 11/13/2020    GLC 88 06/23/2024    GLC 96 11/13/2020    BUN 14.3 06/23/2024    BUN 10 11/13/2020    CR 0.93 06/23/2024    CR 0.83 11/13/2020    GFRESTIMATED 74 06/23/2024    GFRESTIMATED >60 09/15/2023    GFRESTIMATED 83 11/13/2020    GFRESTBLACK >90 11/13/2020    JUAN 8.6 06/23/2024    JUAN 8.6 11/13/2020        INR RESULTS:  Lab Results   Component Value Date    INR 0.93 01/30/2020    INR 0.97 02/28/2019    INR 0.90 12/17/2018    INR 0.96 07/06/2018       Procedures:  PULMONARY FUNCTION TESTS:        No data to display                  ECHOCARDIOGRAM:   No results found for this or any previous visit (from the past 8760 hours).      Assessment and Plan:   1.  Autonomic dysfunction under adequate current control with medication regimen described above.    Plan  1.  Patient to hold use of ivabradine for the " time being and continue medications as currently listed  2.  Follow-up video visit in 6 to 8 months    Total elapsed time today with chart review, clinic visit and documentation 20 minutes    Telephone on 5: Oh 5 PM; off 5: 20 p.m.  Platform Doximity  Patient at home; clinic [facility] heart    I very much appreciated the opportunity to see and assess Ada Welch in the clinic today Please do not hesitate to contact my office if you have any questions or concerns.      Kirk Russell MD  Cardiac Arrhythmia Service  [facility]  [phone]         KIRK RUSSELL      Please do not hesitate to contact me if you have any questions/concerns.     Sincerely,     Kirk Russell MD

## 2024-10-29 NOTE — TELEPHONE ENCOUNTER
LVD:  6/20/2024  [facility] Internal Medicine [place]     Ellyn Rivera MD  Internal Medicine   NVTRANG 10/31/24  Refilled per protocol.

## 2024-10-29 NOTE — NURSING NOTE
Current patient location: [address] [place] [id number]    Is the patient currently in the state of [place]? YES    Visit mode:TELEPHONE    If the visit is dropped, the patient can be reconnected by: TELEPHONE VISIT: Phone number:   Telephone Information:   Mobile [phone]       Will anyone else be joining the visit? NO  (If patient encounters technical issues they should call [phone]    Are changes needed to the allergy or medication list? Pt stated no med changes    Are refills needed on medications prescribed by this physician? NO    Rooming Documentation:  Unable to complete questionnaire(s) due to time    Reason for visit: RECHECK (Have noticed that being on Wegovy and Phentermine heart right and side effects are much better than being on the Corlanor)    Tiara ANNEF

## 2024-10-30 ENCOUNTER — VIRTUAL VISIT (OUTPATIENT)
Dept: ENDOCRINOLOGY | Facility: CLINIC | Age: 54
End: 2024-10-30
Attending: INTERNAL MEDICINE
Payer: COMMERCIAL

## 2024-10-30 DIAGNOSIS — Z86.39 HISTORY OF THYROID DISEASE: Primary | ICD-10-CM

## 2024-10-30 DIAGNOSIS — R73.9 BLOOD SUGAR INCREASED: ICD-10-CM

## 2024-10-30 DIAGNOSIS — E66.3 OVERWEIGHT (BMI 25.0-29.9): ICD-10-CM

## 2024-10-30 DIAGNOSIS — E55.9 VITAMIN D DEFICIENCY: ICD-10-CM

## 2024-10-30 PROCEDURE — 99203 OFFICE O/P NEW LOW 30 MIN: CPT | Mod: 95 | Performed by: PHYSICIAN ASSISTANT

## 2024-10-30 NOTE — PROGRESS NOTES
Virtual Visit Details    Type of service:  Video Visit     Originating Location (pt. Location): Home    Distant Location (provider location):  Off-site  Platform used for Video Visit: Sobeida

## 2024-10-30 NOTE — NURSING NOTE
Current patient location: [place]    Is the patient currently in the state of [place]? YES    Visit mode:VIDEO    If the visit is dropped, the patient can be reconnected by: VIDEO VISIT: Text to cell phone:   Telephone Information:   Mobile [phone]       Will anyone else be joining the visit? NO  (If patient encounters technical issues they should call [phone]    Are changes needed to the allergy or medication list? Unknown, patient completed e-check in for visit. VF unable to review due to time.     Are refills needed on medications prescribed by this physician? Discuss with provider    Rooming Documentation:  Unable to complete questionnaire(s) due to time    Reason for visit: Consult    Yanely TREVINO

## 2024-10-30 NOTE — LETTER
10/30/2024      Ada Welch  [address] [facility] [address]      Dear Colleague,    Thank you for referring your patient, Ada Welch, to the [facility]. Please see a copy of my visit note below.    Virtual Visit Details    Type of service:  Video Visit     Originating Location (pt. Location): Home    Distant Location (provider location):  Off-site  Platform used for Video Visit: PCA Audit    Assessment/Plan :   Obesity. Ada is currently taking Wegovy and she is doing great. The referral for this appt was made after she had to discontinue the medication. Her primary care provider was able to appeal the insurance decision and she is back on the medication. She is doing great and she plans to increase to 1.7 mg this week. We discussed how Wegovy may be helping to decrease inflammation and help with her overall energy. I recommend that she continue with the medication and continue to work up to 2.4 mg weekly. We will plan on a follow-up appt in about 4 mos.  Hypothyroidism. We reviewed her previous laboratory results. Her thyroid levels were stable about a year ago. With her recent weight loss and weight gain, I would like to repeat laboratory testing. I will place an order today for repeat thyroid testing along with a TPO antibody. She will have the labs drawn tomorrow. We will follow-up as needed.    Due to the COVID 19 pandemic this visit was a telephone/video visit in order to help prevent spread of infection in this patient and the general population. The patient gave verbal consent for the visit today. I have independently reviewed and interpreted labs, imaging as indicated.       Distant Location (provider location):  Off-site  Mode of Communication:  Video Conference via [employer]  Chart review/prep time 10    Joined the call at 10/30/2024, 2:13:06 pm.  Left the call at 10/30/2024, 2:28:35 pm.  You were on the call for 15 minutes 28 seconds .  29 minutes spent on  "the date of the encounter doing chart review, history and exam, documentation and further activities as noted above.      Chief complaint:  Ada is a 53 year old female seen in consultation at the request of Dr. Rivera for worsening hyperglycemia.    I have reviewed Care Everywhere including [facility], [facility], [facility], [facility], [facility], [facility] , [facility], [facility] lab reports, imaging reports and provider notes as indicated.      HISTORY OF PRESENT ILLNESS  Ada has a complicated history that includes Tom-Danlos syndrome, history of hypothyroidism, obesity, POTS, HTN, lumbar and cervical radiculopathy, and migraines. She also has a history of gestational diabetes during 1 of her 5 pregnancies. In recent years, she has struggled with glucose fluctuations with significant hypoglycemia. She states that she can never go more than a few hours without food. Over time, this constant need to eat or snack, led to weight gain. Last year, she scheduled a follow-up with her primary care provider to discuss her issues with weight. They decided to start Wegovy and she states that she finally felt \"normal.\"     Unfortunately, a few months ago her insurance stopped coverage. She had to stop the medication and that led to a 50 lbs weight gain. More importantly, she reports that a lot of her issues with pain and irritability returned. She states that she felt horrible. Recently, due to the increase in weight, she was able to restart Wegovy. She has been taking 1 mg weekly and she will increase to 1.7 mg weekly, next week. Again, she is doing great. Her energy level is up, she feels like her blood sugars are stable, and she has been able to lose weight.    Ada also has a history of hypothyroidism. She reports that she was on levothyroxine for many years. She is not sure why it was discontinued but she has been off of it for some time. She was discussing some of her ongoing issues with fatigue " and inflammation and her primary care was worried that it may be related to her thyroid. She states that she does not believe that she was ever checked for Hashimoto's Thyroiditis but she assumes that she has it, due to its connection with Tom Danlos.    Endocrine relevant labs are as follows:   Latest Reference Range & Units 05/24/24 10:27   Hemoglobin A1C 0.0 - 5.6 % 5.4      Latest Reference Range & Units 12/15/23 11:19   Hemoglobin A1C <5.7 % 5.3      Latest Reference Range & Units 12/15/23 11:19   TSH 0.30 - 4.20 uIU/mL 1.90     REVIEW OF SYSTEMS    Endocrine: positive for obesity  Skin: negative  Eyes: negative for, visual blurring, redness, tearing  Ears/Nose/Throat: positive for postnasal drainage, persistent sore throat, negative for, hoarseness, feeling of fullness  Respiratory: No shortness of breath, dyspnea on exertion, cough, or hemoptysis  Cardiovascular: positive for POTS and tachycardia, negative for, chest pain, dyspnea on exertion, and lower extremity edema  Gastrointestinal: negative for, nausea, vomiting, constipation, and diarrhea  Genitourinary: negative for, nocturia, dysuria, frequency, and urgency  Musculoskeletal: positive for neck pain, arthritis, and joint stiffness, negative for, muscular weakness, and nocturnal cramping  Neurologic: positive for numbness or tingling of hands and numbness or tingling of feet  Psychiatric: positive for excessive stress-she is in the process of a divorce  Hematologic/Lymphatic/Immunologic: negative    Past Medical History  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     Cervicalgia      Chronic sinusitis 00/2007    Diagnosed with chronic pansinusitis  2016     Coagulation disorder (H)     factor 8     CSF leak     Chiari malformation     Depression      Tom-Danlos disease      Eosinophilic esophagitis 08/2018     Gastroesophageal reflux disease 3/1/2017    I have large hiatal hernia     Hiatal hernia 2016    Have adeline hiatel hernia     Hoarseness Unsure    It comes and goes     Hypertension      Hypothyroid      IBS (irritable bowel syndrome) with constipation     improved on Linzess     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     Irregular heart beat      Migraines 1/1/2007    Unsure of exact date     Nasal polyps 2013    Were revoved 03/2017, benign     Orthostatic hypotension      Other nervous system complications 1/2014    Autonomic nervous system disorder, neuralgia, neuropathy     Swelling, mass, or lump in head and neck 08/2016    Lymph node has been growing for last year     Thyroid disease 01/01/2008     Urinary incontinence      Urinary urgency        Medications  Current Outpatient Medications   Medication Sig Dispense Refill     acetaminophen (TYLENOL) 500 MG tablet Take 1,000 mg by mouth every 6 hours as needed for mild pain       albuterol (PROAIR HFA/PROVENTIL HFA/VENTOLIN HFA) 108 (90 Base) MCG/ACT inhaler Inhale 2 puffs into the lungs every 6 hours as needed for shortness of breath, wheezing or cough. 18 g 0     atorvastatin (LIPITOR) 20 MG tablet TAKE ONE TABLET BY MOUTH ONCE DAILY 90 tablet 3     benzonatate (TESSALON) 100 MG capsule Take 1 capsule (100 mg) by mouth 3 times daily as needed for cough. 30 capsule 0     benzonatate (TESSALON) 200 MG capsule Take 1 capsule (200 mg) by mouth 3 times daily as needed for cough. 45 capsule 0     buprenorphine (BUTRANS) 10 MCG/HR WK patch Place onto the skin once a week. 4 patch 1     butalbital-acetaminophen-caffeine (ESGIC) -40 MG tablet Take 1 tablet by mouth every 6 hours as needed for headaches 10 tablet 0     celecoxib (CELEBREX) 100 MG capsule TAKE ONE  CAPSULE BY MOUTH TWICE A DAY AS NEEDED FOR MODERATE PAIN. TAKE WITH FOOD 60 capsule 2     cetirizine (ZYRTEC) 10 MG tablet Take 10 mg by mouth daily as needed        co-enzyme Q-10 100 MG CAPS capsule Take 100 mg by mouth daily       cyclobenzaprine (FLEXERIL) 10 MG tablet TAKE ONE TABLET BY MOUTH THREE TIMES A DAY AS NEEDED FOR MUSCLE SPASMS 60 tablet 0     DULoxetine (CYMBALTA) 30 MG capsule TAKE 2 CAPSULES BY MOUTH IN THE MORNING AND 1 CAPSULE BY MOUTH AT BEDTIME 270 capsule 1     EPINEPHrine (ANY BX GENERIC EQUIV) 0.3 MG/0.3ML injection 2-pack Inject 0.3 mLs (0.3 mg) into the muscle as needed for anaphylaxis May repeat one time in 5-15 minutes if response to initial dose is inadequate. 2 each 1     ferrous fumarate 65 mg, [place]. FE,-Vitamin C 125 mg (VITRON-C)  MG TABS tablet Take 1 tablet by mouth every other day 60 tablet 4     fluticasone (FLONASE) 50 MCG/ACT nasal spray SPRAY 2 SPRAYS INTO BOTH NOSTRILS DAILY 48 g 11     guanFACINE (TENEX) 1 MG tablet TAKE ONE TAB BY MOUTH ONCE NIGHTLY AT BEDTIME       HYDROmorphone (DILAUDID) 2 MG tablet Take 1 tablet (2 mg) by mouth 2 times daily 15 tablet 0     hydrOXYzine HCl (ATARAX) 25 MG tablet TAKE 1 TABLET (25 MG) BY MOUTH EVERY 6 HOURS AS NEEDED FOR ANXIETY 180 tablet 1     ibuprofen ([employer] IBUPROFEN) 200 MG capsule Take 600 mg by mouth every 4 hours as needed for fever       ivabradine (CORLANOR) 7.5 MG tablet Take 1 tablet (7.5 mg) by mouth 2 times daily 180 tablet 3     ketorolac (TORADOL) 10 MG tablet Take 1 tablet (10 mg) by mouth every 6 hours as needed for moderate pain. 6 tablet 0     Lidocaine (LIDOCARE) 4 % Patch Place 1-3 patches onto the skin every 24 hours To prevent lidocaine toxicity, patient should be patch free for 12 hrs daily. 20 patch 0     medical cannabis (Patient's own supply.  Not a prescription) Take 1 Dose by mouth See Admin Instructions Capsule formulation - uses as needed       methocarbamol (ROBAXIN) 500 MG tablet TAKE 1 TABLET (500  MG) BY MOUTH 4 TIMES DAILY 30 tablet 0     metoclopramide (REGLAN) 10 MG tablet Take 1 tablet (10 mg) by mouth 4 times daily as needed (headache) 40 tablet 3     metoclopramide (REGLAN) 5 MG tablet Take 1 tablet (5 mg) by mouth 3 times daily as needed (migraine). 12 tablet 0     Multiple Vitamins-Minerals (MULTIVITAMIN PO) Take 1 tablet by mouth daily        naloxone (NARCAN) 4 MG/0.1ML nasal spray Spray 1 spray (4 mg) into one nostril alternating nostrils once as needed for opioid reversal every 2-3 minutes until assistance arrives 0.2 mL 0     phentermine 30 MG capsule Take 1 capsule (30 mg) by mouth every morning. 30 capsule 2     Probiotic Product (PROBIOTIC DAILY PO) Take 6 packets by mouth every morning Five-Lac       propranolol ER (INDERAL LA) 120 MG 24 hr capsule Take 1 capsule (120 mg) by mouth daily 90 capsule 3     ramelteon (ROZEREM) 8 MG tablet Take 1 tablet (8 mg) by mouth at bedtime. 30 tablet 5     rizatriptan (MAXALT-MLT) 5 MG ODT TAKE 1-2 TABLETS (5-10 MG) BY MOUTH AT ONSET OF HEADACHE FOR MIGRAINE (MAX 30 MG IN 24 HOURS) 18 tablet 11     Semaglutide-Weight Management (WEGOVY) 1 MG/0.5ML pen Inject 1 mg subcutaneously once a week. 2 mL 1     Semaglutide-Weight Management (WEGOVY) 1.7 MG/0.75ML pen Inject 1.7 mg subcutaneously once a week. 3 mL 0     SUMAtriptan (IMITREX STATDOSE) 6 MG/0.5ML pen injector kit Inject 0.5 ml (6 mg) subcutaneously at onset of migraine, May repeat in 1 hour , Max 12 mg/24 hrs 2 kit 11     topiramate (TOPAMAX) 100 MG tablet Take 1 tablet (100 mg) by mouth daily 90 tablet 3     traMADol (ULTRAM) 50 MG tablet TAKE ONE TABLET BY MOUTH EVERY 6 HOURS AS NEEDED FOR PAIN 65 tablet 0     vitamin B complex with vitamin C (STRESS TAB) tablet Take 1 tablet by mouth daily       VITAMIN D, CHOLECALCIFEROL, PO Take 2,000 Units by mouth daily       WEGOVY 0.5 MG/0.5ML pen INJECT 0.5 MG UNDER THE SKIN ONCE A WEEK 2 mL 0       Allergies  Allergies   Allergen Reactions     Baclofen Other  (See Comments)     Loss of muscle tone. Muscle weakness.     Bee Venom Shortness Of Breath, Palpitations, Anaphylaxis and Difficulty breathing     Patient does carry Epi-Pen     Chicken-Derived Products (Egg) Nausea and Diarrhea     Compazine [Prochlorperazine]      Extreme jittery     Food      Milk     Gabapentin      Dizzy     Gluten Meal GI Disturbance     Wheat bran and wheat     Pregabalin      Seasonal Allergies      Dust, mold       Family History  family history includes Asthma in her sister and son; Cancer in her father, paternal grandfather, and paternal grandmother; Connective Tissue Disorder in her mother and sister; Diabetes in her maternal grandmother, paternal grandfather, and paternal grandmother; Heart Disease in her maternal grandmother; Hypertension in her maternal grandmother; Migraines in her father, sister, sister, and sister; Thyroid Disease in her mother, sister, sister, and sister.    Social History  Social History     Tobacco Use     Smoking status: Former     Current packs/day: 0.00     Average packs/day: 0.2 packs/day for 22.9 years (4.6 ttl pk-yrs)     Types: Cigarettes     Start date: 1/1/1991     Quit date: 12/1/2013     Years since quitting: 10.9     Passive exposure: Past     Smokeless tobacco: Never   Vaping Use     Vaping status: Never Used   Substance Use Topics     Alcohol use: Yes     Comment: Occasionally     Drug use: Yes     Types: Marijuana     Comment: medical marijuana       Physical Exam  There is no height or weight on file to calculate BMI.  GENERAL: no distress  SKIN: Visible skin clear. No significant rash, abnormal pigmentation or lesions.  EYES: Eyes grossly normal to inspection.  No discharge or erythema, or obvious scleral/conjunctival abnormalities.  NECK: visible goiter is not present; no visible neck masses  RESP: No audible wheeze, cough, or visible cyanosis.  No visible retractions or increased work of breathing.    NEURO: Awake, alert, responds  appropriately to questions.  Mentation and speech fluent.  PSYCH:affect normal and appearance well-groomed.      DATA REVIEW  She only monitors her blood sugars as needed        Again, thank you for allowing me to participate in the care of your patient.        Sincerely,        Noemy Veloz PA-C

## 2024-10-31 ENCOUNTER — OFFICE VISIT (OUTPATIENT)
Dept: INTERNAL MEDICINE | Facility: CLINIC | Age: 54
End: 2024-10-31
Payer: COMMERCIAL

## 2024-10-31 VITALS
HEART RATE: 91 BPM | TEMPERATURE: 97.5 F | RESPIRATION RATE: 16 BRPM | WEIGHT: 169.2 LBS | OXYGEN SATURATION: 97 % | BODY MASS INDEX: 26.56 KG/M2 | SYSTOLIC BLOOD PRESSURE: 104 MMHG | DIASTOLIC BLOOD PRESSURE: 74 MMHG | HEIGHT: 67 IN

## 2024-10-31 DIAGNOSIS — G43.719 INTRACTABLE CHRONIC MIGRAINE WITHOUT AURA AND WITHOUT STATUS MIGRAINOSUS: ICD-10-CM

## 2024-10-31 DIAGNOSIS — R53.82 CHRONIC FATIGUE: ICD-10-CM

## 2024-10-31 DIAGNOSIS — J06.9 UPPER RESPIRATORY TRACT INFECTION, UNSPECIFIED TYPE: ICD-10-CM

## 2024-10-31 DIAGNOSIS — F43.21 ADJUSTMENT DISORDER WITH DEPRESSED MOOD: Primary | ICD-10-CM

## 2024-10-31 PROCEDURE — G2211 COMPLEX E/M VISIT ADD ON: HCPCS | Performed by: INTERNAL MEDICINE

## 2024-10-31 PROCEDURE — 99215 OFFICE O/P EST HI 40 MIN: CPT | Performed by: INTERNAL MEDICINE

## 2024-10-31 RX ORDER — BUDESONIDE AND FORMOTEROL FUMARATE DIHYDRATE 80; 4.5 UG/1; UG/1
2 AEROSOL RESPIRATORY (INHALATION) 2 TIMES DAILY
Qty: 10 G | Refills: 0 | Status: SHIPPED | OUTPATIENT
Start: 2024-10-31

## 2024-10-31 ASSESSMENT — ANXIETY QUESTIONNAIRES
3. WORRYING TOO MUCH ABOUT DIFFERENT THINGS: NOT AT ALL
IF YOU CHECKED OFF ANY PROBLEMS ON THIS QUESTIONNAIRE, HOW DIFFICULT HAVE THESE PROBLEMS MADE IT FOR YOU TO DO YOUR WORK, TAKE CARE OF THINGS AT HOME, OR GET ALONG WITH OTHER PEOPLE: NOT DIFFICULT AT ALL
6. BECOMING EASILY ANNOYED OR IRRITABLE: SEVERAL DAYS
2. NOT BEING ABLE TO STOP OR CONTROL WORRYING: NOT AT ALL
7. FEELING AFRAID AS IF SOMETHING AWFUL MIGHT HAPPEN: SEVERAL DAYS
5. BEING SO RESTLESS THAT IT IS HARD TO SIT STILL: NOT AT ALL
1. FEELING NERVOUS, ANXIOUS, OR ON EDGE: NOT AT ALL
4. TROUBLE RELAXING: NOT AT ALL
GAD7 TOTAL SCORE: 2
GAD7 TOTAL SCORE: 2

## 2024-10-31 ASSESSMENT — PATIENT HEALTH QUESTIONNAIRE - PHQ9: SUM OF ALL RESPONSES TO PHQ QUESTIONS 1-9: 8

## 2024-10-31 NOTE — PROGRESS NOTES
Assessment & Plan     Adjustment disorder with depressed mood  Would like a therapist  - Adult Mental Health  Referral; Future    Upper respiratory tract infection, unspecified type  Does not appear bacterial, advised conservative management for now  - CBC with platelets and differential; Future  - Comprehensive metabolic panel (BMP + Alb, Alk Phos, ALT, AST, Total. Bili, TP); Future  - budesonide-formoterol (SYMBICORT) 80-4.5 MCG/ACT Inhaler; Inhale 2 puffs into the lungs 2 times daily.    Chronic fatigue  May also consider medications, though most of her med predated fatigue. Could consider reducing/titrating off topiramate.  - Adult Sleep Eval & Management Referral; Future    Intractable chronic migraine without aura and without status migrainosus  This is a bit beyond by scope, though could consider TPI or occipital injection given location of pain and character. Deferred to Pain and Neuro.    BMI 31.0-31.9,adult  Doing well on wegovy thus far, as well as phentermine and topiramate.        >40 minutes spent today performing chart review, history and exam, documentation and further activities as noted above, exclusive of any procedures or EKG interpretation    The longitudinal plan of care for the diagnosis(es)/condition(s) as documented were addressed during this visit. Due to the added complexity in care, I will continue to support Ada in the subsequent management and with ongoing continuity of care.          No follow-ups on file.    Subjective   Ada is a 53 year old, presenting for the following health issues:  Consult (Weight, family positive Ankylosing Spondylitis, return to pool PT)      10/31/2024     9:39 AM   Additional Questions   Roomed by [facility] EMT     History of Present Illness       Reason for visit:  See list   She is taking medications regularly.     Scarlett is here today to follow-up on weight, pain     2 new grandkids, closed on new house in [place] [facility], in good place with [place]  living with [facility]    States she is sick since August, green phlegm, +fevers per her report 99/100, she reports cough, neck stiffness, mild dyspnea, low energy  No sore throat, +congestion/drainage, no sinus pain  She had covid beginning of August, then daughter got Flu in Sept    She is not familiary with ankylosing spondyosis info that was in appointmnt notes, not aware of any new diagnoses    Seeing Dr. Huber for LBP  Seeing Dr. Martinez for pain  Seeing Dr. Nguyen for headaches    For weight, 206 starting weight  Started Wegovy, 0.25 in July, recently up to 1.7 mg  206 lbs to 180 lbs, plateau at 175 lbs, now 169 lbs  Insurance covering it right now, up to 1.7 mg  Trying to eat   Taking phentermine 30 mg, wegovy, topamax  No side effects, feels better overall       Detailed Medical History:  -EDS-Type 3 Hypermobility. 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 He reports having a Beighton score of 7-8 out of 9. She reports having genetic testing done, which was questionable for the MYLK Gene. She previously followed with a rheumatologist who is an EDS specialist in [place].  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 Russell, has ILR. Ivadrabine and propronolol helping.  -Cervical instability, chiari malformation:  Saw Dr. Moncada, cervical fusion not recommended, considering occipital blocks. Also traveled to [facility] to see Dr. Andrey Sofia.  -Chronic headaches: Daily tension, cervicogenic, migraine, intracranial hypotension. Has not responded well to injections. Hospitalized in 2017 with normal LP and MRI. At that time responded to DHA.  Had blood patch done 3/19 with good relief of pain. Previously failed amovig and emgality. Blood patch test repeated 9/19. Now managing with medical management, Uses fioricet,maxalt, aleve, tizanidine. Botox.  -Hiatal hernia: Recently saw Dr. Mack and had manometry testing done in Feb 2019 which revealed lower esoph pressure, hiatal hernia, possibly short  esophagus, peristalsis was preserved. S/p Nissen surgery 2/20.  -Eosinophilic esophagitis: EGD 12/18 performed for food impaction showed ring at GEJ and mucosal changes c/w EoE. Biopsies 8/18 positive for Eos.  -Lumbar pain: She underwent L4 to S1 fusion in 2013 followed by removal of instrumentation, which did not improve her symptoms. She met with Dr. Martinez in the pain clinic and was taken off high doses of Dilaudid and started on Suboxone in 2017, which dramatically improved her symptoms.  She continues to work with physical therapy twice a week as well as other complementary therapies.   -Cervical pain, no surgical targets per neurosurgery 2023  -IBS, constipation: GES showed rapid emptying 10/17. XR video swallow showed no aspiration 10/17.  -Asthma  -Enlarged cervical lymph nodes: L level 2 node biopsied 10/18 with scant cellularity. Biopsied in past and benign with neg flow cytometry.  -Urinary urge incontinence/incomplete emptying  -Pelvic floor dysfunction, rectocele, Pelvic Floor Center  -Chronic Pain: Has regimen of medical cannabis, tramadol, butran, cymbalta and tizanidine. Uses medical cannabis since 2018 which helps. Follows with Dr. Martinez.        Routine Health Maintenence:  Depression Screening:     PHQ-2 Score:         6/20/2024    10:34 AM 5/21/2024     8:47 AM   PHQ-2 ( 1999 Pfizer)   Q1: Little interest or pleasure in doing things 0 0   Q2: Feeling down, depressed or hopeless 0 0   PHQ-2 Score 0 0     Lipids:       Recent Labs     Recent Labs   Lab Test 05/24/24  1027 06/12/23  1222   CHOL 189 222*   HDL 62 99   * 94   TRIG 130 143          Lung Ca Screening (>30 pk age 55-79 or >20 py age 50-79 + RF): n/a low pack years  Colonoscopy (50-75 yrs): 3/17 , due 2027   - One small hyperplastic polyp in the sigmoid colon, removed with a cold snare. Resected and retrieved. Diverticulosis in the sigmoid colon. Repeat colonoscopy in10 years for surveillance   Dexa (>65W or 70M yrs): n/a  Mammogram  "(40-75 yrs): 1/18 ordered, 6/22, 3/24  Pap (21-65 yrs): Pap results:      Latest Ref Rng & Units 4/1/2024    12:45 PM 1/19/2018    11:46 AM 1/19/2018    11:00 AM   PAP / HPV   PAP  Negative for Intraepithelial Lesion or Malignancy (NILM)      PAP (Historical)   NIL     HPV 16 DNA Negative Negative   Negative    HPV 18 DNA Negative Negative   Negative    Other HR HPV Negative Negative   Negative        HIV/HCV if risk factors:  HIV neg 4/18  Tob/EtOH: screen neg  Lifestyle factors: reviewed  Advanced Directive: deferred                        Objective    /74 (BP Location: Right arm, Patient Position: Sitting, Cuff Size: Adult Large)   Pulse 91   Temp 97.5  F (36.4  C) (Oral)   Resp 16   Ht 1.702 m (5' 7\")   Wt 76.7 kg (169 lb 3.2 oz)   LMP  (LMP Unknown)   SpO2 97%   BMI 26.50 kg/m    Body mass index is 26.5 kg/m .  Physical Exam   Constitutional: Alert, oriented, pleasant, no acute distress  Head: Normocephalic, atraumatic  Eyes: Extra-ocular movements intact, no scleral icterus  ENT: Oropharynx clear, moist mucus membranes, no sinus tenderness  Neck: Supple, no lymphadenopathy  Cardiovascular: Regular rate and rhythm, no murmurs, rubs or gallops, peripheral pulses full/symmetric  Respiratory: Good air movement bilaterally, lungs clear, no wheezes/rales/rhonchi  Musculoskeletal: No edema, normal muscle tone, normal gait  Neurologic: Alert and oriented, cranial nerves 2-12 intact, no focal deficits  Psychiatric: normal mentation, affect and mood              Signed Electronically by: Ellyn Rivera MD    "

## 2024-10-31 NOTE — PROGRESS NOTES
"  Scarlett is here today to follow-up on weight, pain     2 new grandkids, closed on new house in [facility], in good place with Flak[place], living with Radha    States she is sick since August, green phlegm, +fevers per her report 99/100, she reports cough, neck stiffness, mild dyspnea, low energy  No sore throat, +congestion/drainage, no sinus pain  She had covid beginning of August, then daughter got Flu in Sept    EDST3, MTHFR+  PGx 2022  She is not familiary with ankylosing spondyosis info that was in appointmnt notes, not aware of any new diagnoses      Seeing Dr. Huber for LBP    206 starting weight  Started Wegovy, 0.25 in July, recently up to 0.5 mg  206 lbs to 180 lbs, plateau at 175 lbs, now 169 lbs  Insurance covering it right now, up to 1.7 mg  Trying to eat   Taking phentermine 30 mg, wegovy, topamax    No side effects, feels better overall      11/23 got SUNSHINE L3-4, helped with radicular pain  Still has a lower lumbar and thoracic \"hot spot\"  Uses THC balm, getting muscle relaxer from [employer]  Starting PT, Teena Boothe for fitness/aquatic, [facility] as well  States hydroxyzine is helping significantly for anxiety around pain flares     Previously, we stopped amitriptyline given weight gain  Replaced with doxepin, still struggling with sleep  Now on remelton per PharmD  Given recent relationship struggles, she started seeing a therapist for anxiety, wondering about other anxiety drugs  Seeing [facility] at [facility]     Past Documentation:  She is having a pain flare lately, got an SUNSHINE recently 6/8, still having a lot of pain, using tizanidine and tramadol, she got a short term rx for dilaudid, using cannabis as well  Considering repeat fusion     Detailed Medical History:  -EDS-Type 3 Hypermobility. 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 He reports having a Beighton score of 7-8 out of 9. She reports having genetic testing done, which was questionable for the MYLK Gene. She previously followed with a rheumatologist " who is an EDS specialist in [place].  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 Russell, has ILR. Ivadrabine and propronolol helping.  -Cervical instability, chiari malformation:  Saw Dr. Moncada, cervical fusion not recommended, considering occipital blocks. [place] [facility] Andrey Sof[place].  -Chronic headaches: Daily tension, cervicogenic, migraine, intracranial hypotension. Has not responded well to injections. Hospitalized in 2017 with normal LP and MRI. At that time responded to DHA.  Had blood patch done 3/19 with good relief of pain. Previously failed amovig and emgality. Blood patch test repeated 9/19. Now managing with medical management, Uses fioricet,maxalt, aleve, tizanidine. Botox.  -Hiatal hernia: Recently saw Dr. Mack and had manometry testing done in Feb 2019 which revealed lower esoph pressure, hiatal hernia, possibly short esophagus, peristalsis was preserved. S/p Nissen surgery 2/20.  -Eosinophilic esophagitis: EGD 12/18 performed for food impaction showed ring at GEJ and mucosal changes c/w EoE. Biopsies 8/18 positive for Eos.  -Lumbar pain: She underwent L4 to S1 fusion in 2013 followed by removal of instrumentation, which did not improve her symptoms. She met with Dr. Martinez in the pain clinic and was taken off high doses of Dilaudid and started on Suboxone in 2017, which dramatically improved her symptoms.  She continues to work with physical therapy twice a week as well as other complementary therapies.   -Cervical pain, no surgical targets per neurosurgery 2023  -IBS, constipation: GES showed rapid emptying 10/17. XR video swallow showed no aspiration 10/17.  -Asthma  -Enlarged cervical lymph nodes: L level 2 node biopsied 10/18 with scant cellularity. Biopsied in past and benign with neg flow cytometry.  -Urinary urge incontinence/incomplete emptying  -Pelvic floor dysfunction, rectocele, [facility]  -Chronic Pain: Has regimen of medical cannabis,  tramadol, butran, cymbalta and tizanidine. Uses medical cannabis since 2018 which helps. Follows with Dr. Martinez.        Routine Health Maintenence:  Depression Screening:   PHQ-2 Score:           2/23/2023     8:24 AM 7/14/2022     9:12 AM   PHQ-2 ( 1999 Pf[employer])   Q1: Little interest or pleasure in doing things 0 0   Q2: Feeling down, depressed or hopeless 0 0   PHQ-2 Score 0 0         Immunizations (zoster, pneumovax, flu, Tdap, Hep A/B):   There is no immunization history on file for this patient.  Lipids:       Recent Labs   Lab Test 04/26/18  1010   CHOL 244*   HDL 48*   *   TRIG 156*         Lung Ca Screening (>30 pk age 55-79 or >20 py age 50-79 + RF): n/a low pack years  Colonoscopy (50-75 yrs): 3/17 , due 2027   - One small hyperplastic polyp in the sigmoid colon, removed with a cold snare. Resected and retrieved. Diverticulosis in the sigmoid colon. Repeat colonoscopy in10 years for surveillance   Dexa (>65W or 70M yrs): n/a  Mammogram (40-75 yrs): 1/18 ordered, 6/22, ordered  Pap (21-65 yrs): 1/18 HPV neg, due 1/23  HIV/HCV if risk factors:  HIV neg 4/18  Tob/EtOH: screen neg  Lifestyle factors: reviewed  Advanced Directive: deferred

## 2024-11-04 DIAGNOSIS — M96.1 POSTLAMINECTOMY SYNDROME, LUMBAR REGION: ICD-10-CM

## 2024-11-04 DIAGNOSIS — M96.1 POSTLAMINECTOMY SYNDROME: ICD-10-CM

## 2024-11-04 DIAGNOSIS — F41.9 ANXIETY: Primary | ICD-10-CM

## 2024-11-04 DIAGNOSIS — Q79.60 EHLERS-DANLOS SYNDROME: ICD-10-CM

## 2024-11-04 DIAGNOSIS — M96.1 FAILED BACK SURGICAL SYNDROME: ICD-10-CM

## 2024-11-04 RX ORDER — METHOCARBAMOL 500 MG/1
500 TABLET, FILM COATED ORAL 4 TIMES DAILY
Qty: 30 TABLET | Refills: 0 | Status: SHIPPED | OUTPATIENT
Start: 2024-11-04

## 2024-11-04 NOTE — TELEPHONE ENCOUNTER
Refill request    Medication: methocarbamol (ROBAXIN) 500 MG tablet    Sig: TAKE 1 TABLET (500 MG) BY MOUTH 4 TIMES DAILY    Dispensed: 30  Refills: 0    Last prescribed to patient: 10/8/24     Last clinic appointment: 8/19/24  Next clinic appointment: none listed    Preferred pharmacy:    [facility] [id number] [place] [address]    Refill request routed to the provider to review.

## 2024-11-04 NOTE — TELEPHONE ENCOUNTER
Refill request    Medication: traMADol (ULTRAM) 50 MG tablet     Sig:  TAKE ONE TABLET BY MOUTH EVERY 6 HOURS AS NEEDED FOR PAIN    Dispensed: 65  Refills: 0  SOLD to the pt on: 10/25/24     Last clinic appointment: 8/19/24  Next clinic appointment: none listed    Last Drug Screen Collected: 8/5/21  Controlled Substance Agreement signed: 8/5/21    Preferred pharmacy:    [facility] [id number] [place] [address]    Refill request routed to the provider to review.

## 2024-11-05 DIAGNOSIS — G43.719 INTRACTABLE CHRONIC MIGRAINE WITHOUT AURA AND WITHOUT STATUS MIGRAINOSUS: ICD-10-CM

## 2024-11-05 RX ORDER — TRAMADOL HYDROCHLORIDE 50 MG/1
50 TABLET ORAL EVERY 6 HOURS PRN
Qty: 65 TABLET | Refills: 0 | OUTPATIENT
Start: 2024-11-05

## 2024-11-06 RX ORDER — METOCLOPRAMIDE 10 MG/1
10 TABLET ORAL 4 TIMES DAILY PRN
Qty: 40 TABLET | Refills: 3 | Status: SHIPPED | OUTPATIENT
Start: 2024-11-06

## 2024-11-06 NOTE — TELEPHONE ENCOUNTER
RX Authorization    Medication: Metoclopramide (REGLAN) 10 MG tablet    Date last refill ordered: 2/17/2024    Quantity ordered: 40    # refills: 3    Date of last clinic visit with ordering provider: 5/21/2024    Date of next clinic visit with ordering provider:    All pertinent protocol data (lab date/result):    Include pertinent information from patients message:

## 2024-11-08 RX ORDER — CELECOXIB 100 MG/1
100 CAPSULE ORAL PRN
Qty: 60 CAPSULE | Refills: 2 | Status: SHIPPED | OUTPATIENT
Start: 2024-11-08

## 2024-11-08 RX ORDER — HYDROXYZINE HYDROCHLORIDE 25 MG/1
25 TABLET, FILM COATED ORAL EVERY 6 HOURS PRN
Qty: 180 TABLET | Refills: 1 | Status: SHIPPED | OUTPATIENT
Start: 2024-11-08

## 2024-11-08 NOTE — TELEPHONE ENCOUNTER
Last Clinic Visit: [facility]  10/31/24  [place]: 1/31/25  Always Fail Criteria reviewed

## 2024-11-12 ENCOUNTER — OFFICE VISIT (OUTPATIENT)
Dept: NEUROSURGERY | Facility: CLINIC | Age: 54
End: 2024-11-12
Payer: COMMERCIAL

## 2024-11-12 VITALS
SYSTOLIC BLOOD PRESSURE: 111 MMHG | RESPIRATION RATE: 16 BRPM | HEART RATE: 80 BPM | OXYGEN SATURATION: 99 % | DIASTOLIC BLOOD PRESSURE: 79 MMHG

## 2024-11-12 DIAGNOSIS — Q79.60 EHLERS-DANLOS SYNDROME: ICD-10-CM

## 2024-11-12 DIAGNOSIS — M96.1 FAILED BACK SURGICAL SYNDROME: Primary | ICD-10-CM

## 2024-11-12 ASSESSMENT — PAIN SCALES - GENERAL: PAINLEVEL_OUTOF10: SEVERE PAIN (7)

## 2024-11-12 NOTE — LETTER
"11/12/2024       RE: Ada Welch  [address] [facility] [address]     Dear Colleague,    Thank you for referring your patient, Ada Welch, to the [facility] NEUROSURGERY CLINIC [place] at [facility]. Please see a copy of my visit note below.    Neurosurgery Clinic Note    Reason for Visit: chronic pain    History of Present Illness  Ada Welch is a 53-year-old female with a complex history including Tom-Danlos syndrome (EDS), dysautonomia, and a history of spinal fusion, who presents for follow-up on her longstanding chronic neck and back pain. Since her last visit, she has continued to make lifestyle adjustments, including further weight loss, which has brought her to 155 pounds from a previous high of 215 pounds. Her goal weight remains around 135 pounds, at which she reports feeling her best physically. However, despite her efforts, she reports feeling that her pain relief has reached a plateau and may be worsening again, particularly in her neck. She has also experienced persistent, progressive weakness and numbness, predominantly worsening with prolonged upright posture. These symptoms have not been associated with structural compression per her prior imaging, although she is curious about the potential utility of an upright MRI for her EDS.    Ms. Welch notes that her pain is constant at baseline, with a daily severity of 6-7/10, which can increase with activity. She experiences numbness and a burning \"fire ants\" sensation that spreads through her shoulders, down her arms, and sometimes into her legs. She has previously worked with Dr. Martinez on medication management and epidural injections, which have provided intermittent relief, but she is eager for more durable and functional improvements.    Given her chronic, overlapping pain conditions, we discussed both immediate and long-term management strategies, including trial " options to target specific pain circuits and modulate her chronic pain.         Allergies   Allergen Reactions     Baclofen Other (See Comments)     Loss of muscle tone. Muscle weakness.     Bee Venom Shortness Of Breath, Palpitations, Anaphylaxis and Difficulty breathing     Patient does carry Epi-Pen     Chicken-Derived Products (Egg) Nausea and Diarrhea     Compazine [Prochlorperazine]      Extreme jittery     Food      Milk     Gabapentin      Dizzy     Gluten Meal GI Disturbance     Wheat bran and wheat     Pregabalin      Seasonal Allergies      Dust, mold       Current Outpatient Medications   Medication Sig Dispense Refill     acetaminophen (TYLENOL) 500 MG tablet Take 1,000 mg by mouth every 6 hours as needed for mild pain       albuterol (PROAIR HFA/PROVENTIL HFA/VENTOLIN HFA) 108 (90 Base) MCG/ACT inhaler Inhale 2 puffs into the lungs every 6 hours as needed for shortness of breath, wheezing or cough. 18 g 0     atorvastatin (LIPITOR) 20 MG tablet TAKE ONE TABLET BY MOUTH ONCE DAILY 90 tablet 3     benzonatate (TESSALON) 100 MG capsule Take 1 capsule (100 mg) by mouth 3 times daily as needed for cough. 30 capsule 0     benzonatate (TESSALON) 200 MG capsule Take 1 capsule (200 mg) by mouth 3 times daily as needed for cough. 45 capsule 0     budesonide-formoterol (SYMBICORT) 80-4.5 MCG/ACT Inhaler Inhale 2 puffs into the lungs 2 times daily. 10 g 0     buprenorphine (BUTRANS) 10 MCG/HR WK patch Place onto the skin once a week. 4 patch 1     butalbital-acetaminophen-caffeine (ESGIC) -40 MG tablet Take 1 tablet by mouth every 6 hours as needed for headaches 10 tablet 0     celecoxib (CELEBREX) 100 MG capsule Take 1 capsule (100 mg) by mouth as needed for moderate pain. 60 capsule 2     cetirizine (ZYRTEC) 10 MG tablet Take 10 mg by mouth daily as needed        co-enzyme Q-10 100 MG CAPS capsule Take 100 mg by mouth daily       cyclobenzaprine (FLEXERIL) 10 MG tablet TAKE ONE TABLET BY MOUTH THREE TIMES  A DAY AS NEEDED FOR MUSCLE SPASMS 60 tablet 0     DULoxetine (CYMBALTA) 30 MG capsule TAKE 2 CAPSULES BY MOUTH IN THE MORNING AND 1 CAPSULE BY MOUTH AT BEDTIME 270 capsule 1     EPINEPHrine (ANY BX GENERIC EQUIV) 0.3 MG/0.3ML injection 2-pack Inject 0.3 mLs (0.3 mg) into the muscle as needed for anaphylaxis May repeat one time in 5-15 minutes if response to initial dose is inadequate. 2 each 1     ferrous fumarate 65 mg, [place]. FE,-Vitamin C 125 mg (VITRON-C)  MG TABS tablet Take 1 tablet by mouth every other day 60 tablet 4     fluticasone (FLONASE) 50 MCG/ACT nasal spray SPRAY 2 SPRAYS INTO BOTH NOSTRILS DAILY 48 g 11     guanFACINE (TENEX) 1 MG tablet TAKE ONE TAB BY MOUTH ONCE NIGHTLY AT BEDTIME       HYDROmorphone (DILAUDID) 2 MG tablet Take 1 tablet (2 mg) by mouth 2 times daily 15 tablet 0     hydrOXYzine HCl (ATARAX) 25 MG tablet Take 1 tablet (25 mg) by mouth every 6 hours as needed for anxiety. 180 tablet 1     ibuprofen ([employer] IBUPROFEN) 200 MG capsule Take 600 mg by mouth every 4 hours as needed for fever       ketorolac (TORADOL) 10 MG tablet Take 1 tablet (10 mg) by mouth every 6 hours as needed for moderate pain. 6 tablet 0     Lidocaine (LIDOCARE) 4 % Patch Place 1-3 patches onto the skin every 24 hours To prevent lidocaine toxicity, patient should be patch free for 12 hrs daily. 20 patch 0     medical cannabis (Patient's own supply.  Not a prescription) Take 1 Dose by mouth See Admin Instructions Capsule formulation - uses as needed       methocarbamol (ROBAXIN) 500 MG tablet TAKE ONE TABLET BY MOUTH FOUR TIMES A DAY 30 tablet 0     metoclopramide (REGLAN) 10 MG tablet Take 1 tablet (10 mg) by mouth 4 times daily as needed (headache). 40 tablet 3     metoclopramide (REGLAN) 5 MG tablet Take 1 tablet (5 mg) by mouth 3 times daily as needed (migraine). 12 tablet 0     Multiple Vitamins-Minerals (MULTIVITAMIN PO) Take 1 tablet by mouth daily        phentermine 30 MG capsule Take 1 capsule (30 mg) by  mouth every morning. 30 capsule 2     Probiotic Product (PROBIOTIC DAILY PO) Take 6 packets by mouth every morning Five-Lac       propranolol ER (INDERAL LA) 120 MG 24 hr capsule Take 1 capsule (120 mg) by mouth daily 90 capsule 3     ramelteon (ROZEREM) 8 MG tablet Take 1 tablet (8 mg) by mouth at bedtime. 30 tablet 5     rizatriptan (MAXALT-MLT) 5 MG ODT TAKE 1-2 TABLETS (5-10 MG) BY MOUTH AT ONSET OF HEADACHE FOR MIGRAINE (MAX 30 MG IN 24 HOURS) 18 tablet 11     Semaglutide-Weight Management (WEGOVY) 1.7 MG/0.75ML pen Inject 1.7 mg subcutaneously once a week. 3 mL 0     SUMAtriptan (IMITREX STATDOSE) 6 MG/0.5ML pen injector kit Inject 0.5 ml (6 mg) subcutaneously at onset of migraine, May repeat in 1 hour , Max 12 mg/24 hrs 2 kit 11     topiramate (TOPAMAX) 100 MG tablet Take 1 tablet (100 mg) by mouth daily 90 tablet 3     traMADol (ULTRAM) 50 MG tablet TAKE ONE TABLET BY MOUTH EVERY 6 HOURS AS NEEDED FOR PAIN 65 tablet 0     vitamin B complex with vitamin C (STRESS TAB) tablet Take 1 tablet by mouth daily       VITAMIN D, CHOLECALCIFEROL, PO Take 2,000 Units by mouth daily       naloxone (NARCAN) 4 MG/0.1ML nasal spray Spray 1 spray (4 mg) into one nostril alternating nostrils once as needed for opioid reversal every 2-3 minutes until assistance arrives (Patient not taking: Reported on 11/12/2024) 0.2 mL 0     Current Facility-Administered Medications   Medication Dose Route Frequency Provider Last Rate Last Admin     Botulinum Toxin Type A (BOTOX) 200 units injection 155 Units  155 Units Intramuscular See Admin Instructions Noemy Nguyen MD   155 Units at 10/08/24 1200     Botulinum Toxin Type A (BOTOX) 200 units injection 155 Units  155 Units Intramuscular Q12 weeks Kristin Murphy MD         Botulinum Toxin Type A (BOTOX) 200 units injection 155 Units  155 Units Intramuscular See Admin Instructions Noemy Nguyen MD   155 Units at 03/28/23 1442             Physical Exam  BP  111/79   Pulse 80   Resp 16   LMP  (LMP Unknown)   SpO2 99%       General: Awake, alert, oriented. Well nourished, well developed, no acute distress.  HEENT: Head normocephalic, atraumatic.     Neurological  Awake, alert and oriented to date, time, place and person. Speech fluent.   Pupils equal, round, reactive to light.  Extraocular movement intact.  Face symmetric.  Tongue midline.  Uvula elevates equally.    Motor: full strength throughout.  Sensation: intact to light touch     ROS: 10 point ROS neg other than the symptoms noted above in the HPI.        Assessment and Plan   Ms. Ada Welch is a 53-year-old female with a complex medical history, primarily significant for Tom-Danlos syndrome, dysautonomia, a history of spinal fusion, chronic neck and back pain, and failed back surgery syndrome. She has pursued extensive conservative and interventional treatments with limited long-term efficacy, including medications, epidural injections, pool therapy, and physical therapy. Ms. Welch s pain remains severe and disabling, typically ranging from 6-7/10 in intensity, and is exacerbated by upright positioning. She describes her pain as radiating, burning, and searing, often accompanied by numbness and  fire ant  sensations, particularly in her shoulders and extremities, with associated weakness as the day progresses. She reports substantial improvement in quality of life with previous weight loss while on Wegovy but has experienced some weight gain after discontinuation, worsening her chronic pain and functional limitations. Her complex presentation, likely influenced by overlapping pain conditions associated with her connective tissue disorder, contributes to significant challenges in management.    Given her history of failed fusion and multiple comorbidities, a targeted, team-based approach is essential. We discussed spinal cord stimulation (SCS) as a potential adjunct for managing her low back pain,  acknowledging the risks of infection, bleeding, electrode migration, and the specific challenges presented by her underlying connective tissue disorder. Ms. Welch is motivated to engage in a comprehensive, multi-modal plan to regain mobility and reduce her dependence on opioid therapy. She remains engaged in structured physical therapy and weight management, with upcoming consultations with endocrinology and her primary care provider to optimize her pain management and functional status. Additionally, she expressed interest in a pain modulation trial using TMS focused on individual pain circuitry, which may provide further insights into her specific pain mechanisms and potential avenues for targeted treatment. We will proceed with the required neuropsychological evaluation and inter-specialty coordination to determine her candidacy for SCS, while continuing to explore integrative options to support her long-term goals of functional independence and quality of life improvement. I will discuss timing and a potential trial of SCS with Dr. Martinez given ongoing scheduling challenges. She understands that SCS may not work as well for her but she is optimistic about having another tool to help with her pain beyond opioids.      Clinical trial of TMS (RECODE study) referral  Discuss SCS trial with Juan Huber MD  Department of Neurosurgery  [facility]      Again, thank you for allowing me to participate in the care of your patient.      Sincerely,    Kirk Huber MD

## 2024-11-12 NOTE — PROGRESS NOTES
"Neurosurgery Clinic Note    Reason for Visit: chronic pain    History of Present Illness  Ada Welch is a 53-year-old female with a complex history including Tom-Danlos syndrome (EDS), dysautonomia, and a history of spinal fusion, who presents for follow-up on her longstanding chronic neck and back pain. Since her last visit, she has continued to make lifestyle adjustments, including further weight loss, which has brought her to 155 pounds from a previous high of 215 pounds. Her goal weight remains around 135 pounds, at which she reports feeling her best physically. However, despite her efforts, she reports feeling that her pain relief has reached a plateau and may be worsening again, particularly in her neck. She has also experienced persistent, progressive weakness and numbness, predominantly worsening with prolonged upright posture. These symptoms have not been associated with structural compression per her prior imaging, although she is curious about the potential utility of an upright MRI for her EDS.    Ms. Welch notes that her pain is constant at baseline, with a daily severity of 6-7/10, which can increase with activity. She experiences numbness and a burning \"fire ants\" sensation that spreads through her shoulders, down her arms, and sometimes into her legs. She has previously worked with Dr. Martinez on medication management and epidural injections, which have provided intermittent relief, but she is eager for more durable and functional improvements.    Given her chronic, overlapping pain conditions, we discussed both immediate and long-term management strategies, including trial options to target specific pain circuits and modulate her chronic pain.         Allergies   Allergen Reactions    Baclofen Other (See Comments)     Loss of muscle tone. Muscle weakness.    Bee Venom Shortness Of Breath, Palpitations, Anaphylaxis and Difficulty breathing     Patient does carry Epi-Pen    Chicken-Derived " Products (Egg) Nausea and Diarrhea    Compazine [Prochlorperazine]      Extreme jittery    Food      Milk    Gabapentin      Dizzy    Gluten Meal GI Disturbance     Wheat bran and wheat    Pregabalin     Seasonal Allergies      Dust, mold       Current Outpatient Medications   Medication Sig Dispense Refill    acetaminophen (TYLENOL) 500 MG tablet Take 1,000 mg by mouth every 6 hours as needed for mild pain      albuterol (PROAIR HFA/PROVENTIL HFA/VENTOLIN HFA) 108 (90 Base) MCG/ACT inhaler Inhale 2 puffs into the lungs every 6 hours as needed for shortness of breath, wheezing or cough. 18 g 0    atorvastatin (LIPITOR) 20 MG tablet TAKE ONE TABLET BY MOUTH ONCE DAILY 90 tablet 3    benzonatate (TESSALON) 100 MG capsule Take 1 capsule (100 mg) by mouth 3 times daily as needed for cough. 30 capsule 0    benzonatate (TESSALON) 200 MG capsule Take 1 capsule (200 mg) by mouth 3 times daily as needed for cough. 45 capsule 0    budesonide-formoterol (SYMBICORT) 80-4.5 MCG/ACT Inhaler Inhale 2 puffs into the lungs 2 times daily. 10 g 0    buprenorphine (BUTRANS) 10 MCG/HR WK patch Place onto the skin once a week. 4 patch 1    butalbital-acetaminophen-caffeine (ESGIC) -40 MG tablet Take 1 tablet by mouth every 6 hours as needed for headaches 10 tablet 0    celecoxib (CELEBREX) 100 MG capsule Take 1 capsule (100 mg) by mouth as needed for moderate pain. 60 capsule 2    cetirizine (ZYRTEC) 10 MG tablet Take 10 mg by mouth daily as needed       co-enzyme Q-10 100 MG CAPS capsule Take 100 mg by mouth daily      cyclobenzaprine (FLEXERIL) 10 MG tablet TAKE ONE TABLET BY MOUTH THREE TIMES A DAY AS NEEDED FOR MUSCLE SPASMS 60 tablet 0    DULoxetine (CYMBALTA) 30 MG capsule TAKE 2 CAPSULES BY MOUTH IN THE MORNING AND 1 CAPSULE BY MOUTH AT BEDTIME 270 capsule 1    EPINEPHrine (ANY BX GENERIC EQUIV) 0.3 MG/0.3ML injection 2-pack Inject 0.3 mLs (0.3 mg) into the muscle as needed for anaphylaxis May repeat one time in 5-15 minutes  if response to initial dose is inadequate. 2 each 1    ferrous fumarate 65 mg, [place]. FE,-Vitamin C 125 mg (VITRON-C)  MG TABS tablet Take 1 tablet by mouth every other day 60 tablet 4    fluticasone (FLONASE) 50 MCG/ACT nasal spray SPRAY 2 SPRAYS INTO BOTH NOSTRILS DAILY 48 g 11    guanFACINE (TENEX) 1 MG tablet TAKE ONE TAB BY MOUTH ONCE NIGHTLY AT BEDTIME      HYDROmorphone (DILAUDID) 2 MG tablet Take 1 tablet (2 mg) by mouth 2 times daily 15 tablet 0    hydrOXYzine HCl (ATARAX) 25 MG tablet Take 1 tablet (25 mg) by mouth every 6 hours as needed for anxiety. 180 tablet 1    ibuprofen ([employer] IBUPROFEN) 200 MG capsule Take 600 mg by mouth every 4 hours as needed for fever      ketorolac (TORADOL) 10 MG tablet Take 1 tablet (10 mg) by mouth every 6 hours as needed for moderate pain. 6 tablet 0    Lidocaine (LIDOCARE) 4 % Patch Place 1-3 patches onto the skin every 24 hours To prevent lidocaine toxicity, patient should be patch free for 12 hrs daily. 20 patch 0    medical cannabis (Patient's own supply.  Not a prescription) Take 1 Dose by mouth See Admin Instructions Capsule formulation - uses as needed      methocarbamol (ROBAXIN) 500 MG tablet TAKE ONE TABLET BY MOUTH FOUR TIMES A DAY 30 tablet 0    metoclopramide (REGLAN) 10 MG tablet Take 1 tablet (10 mg) by mouth 4 times daily as needed (headache). 40 tablet 3    metoclopramide (REGLAN) 5 MG tablet Take 1 tablet (5 mg) by mouth 3 times daily as needed (migraine). 12 tablet 0    Multiple Vitamins-Minerals (MULTIVITAMIN PO) Take 1 tablet by mouth daily       phentermine 30 MG capsule Take 1 capsule (30 mg) by mouth every morning. 30 capsule 2    Probiotic Product (PROBIOTIC DAILY PO) Take 6 packets by mouth every morning Five-Lac      propranolol ER (INDERAL LA) 120 MG 24 hr capsule Take 1 capsule (120 mg) by mouth daily 90 capsule 3    ramelteon (ROZEREM) 8 MG tablet Take 1 tablet (8 mg) by mouth at bedtime. 30 tablet 5    rizatriptan (MAXALT-MLT) 5 MG ODT  TAKE 1-2 TABLETS (5-10 MG) BY MOUTH AT ONSET OF HEADACHE FOR MIGRAINE (MAX 30 MG IN 24 HOURS) 18 tablet 11    Semaglutide-Weight Management (WEGOVY) 1.7 MG/0.75ML pen Inject 1.7 mg subcutaneously once a week. 3 mL 0    SUMAtriptan (IMITREX STATDOSE) 6 MG/0.5ML pen injector kit Inject 0.5 ml (6 mg) subcutaneously at onset of migraine, May repeat in 1 hour , Max 12 mg/24 hrs 2 kit 11    topiramate (TOPAMAX) 100 MG tablet Take 1 tablet (100 mg) by mouth daily 90 tablet 3    traMADol (ULTRAM) 50 MG tablet TAKE ONE TABLET BY MOUTH EVERY 6 HOURS AS NEEDED FOR PAIN 65 tablet 0    vitamin B complex with vitamin C (STRESS TAB) tablet Take 1 tablet by mouth daily      VITAMIN D, CHOLECALCIFEROL, PO Take 2,000 Units by mouth daily      naloxone (NARCAN) 4 MG/0.1ML nasal spray Spray 1 spray (4 mg) into one nostril alternating nostrils once as needed for opioid reversal every 2-3 minutes until assistance arrives (Patient not taking: Reported on 11/12/2024) 0.2 mL 0     Current Facility-Administered Medications   Medication Dose Route Frequency Provider Last Rate Last Admin    Botulinum Toxin Type A (BOTOX) 200 units injection 155 Units  155 Units Intramuscular See Admin Instructions Noemy Nguyen MD   155 Units at 10/08/24 1200    Botulinum Toxin Type A (BOTOX) 200 units injection 155 Units  155 Units Intramuscular Q12 weeks Kristin Murphy MD        Botulinum Toxin Type A (BOTOX) 200 units injection 155 Units  155 Units Intramuscular See Admin Instructions Noemy Nguyen MD   155 Units at 03/28/23 1442             Physical Exam  /79   Pulse 80   Resp 16   LMP  (LMP Unknown)   SpO2 99%       General: Awake, alert, oriented. Well nourished, well developed, no acute distress.  HEENT: Head normocephalic, atraumatic.     Neurological  Awake, alert and oriented to date, time, place and person. Speech fluent.   Pupils equal, round, reactive to light.  Extraocular movement intact.  Face  symmetric.  Tongue midline.  Uvula elevates equally.    Motor: full strength throughout.  Sensation: intact to light touch     ROS: 10 point ROS neg other than the symptoms noted above in the HPI.        Assessment and Plan   Ms. Ada Welch is a 53-year-old female with a complex medical history, primarily significant for Tom-Danlos syndrome, dysautonomia, a history of spinal fusion, chronic neck and back pain, and failed back surgery syndrome. She has pursued extensive conservative and interventional treatments with limited long-term efficacy, including medications, epidural injections, pool therapy, and physical therapy. Ms. Welch s pain remains severe and disabling, typically ranging from 6-7/10 in intensity, and is exacerbated by upright positioning. She describes her pain as radiating, burning, and searing, often accompanied by numbness and  fire ant  sensations, particularly in her shoulders and extremities, with associated weakness as the day progresses. She reports substantial improvement in quality of life with previous weight loss while on Wegovy but has experienced some weight gain after discontinuation, worsening her chronic pain and functional limitations. Her complex presentation, likely influenced by overlapping pain conditions associated with her connective tissue disorder, contributes to significant challenges in management.    Given her history of failed fusion and multiple comorbidities, a targeted, team-based approach is essential. We discussed spinal cord stimulation (SCS) as a potential adjunct for managing her low back pain, acknowledging the risks of infection, bleeding, electrode migration, and the specific challenges presented by her underlying connective tissue disorder. Ms. Welch is motivated to engage in a comprehensive, multi-modal plan to regain mobility and reduce her dependence on opioid therapy. She remains engaged in structured physical therapy and weight management,  with upcoming consultations with endocrinology and her primary care provider to optimize her pain management and functional status. Additionally, she expressed interest in a pain modulation trial using TMS focused on individual pain circuitry, which may provide further insights into her specific pain mechanisms and potential avenues for targeted treatment. We will proceed with the required neuropsychological evaluation and inter-specialty coordination to determine her candidacy for SCS, while continuing to explore integrative options to support her long-term goals of functional independence and quality of life improvement. I will discuss timing and a potential trial of SCS with Dr. Martinez given ongoing scheduling challenges. She understands that SCS may not work as well for her but she is optimistic about having another tool to help with her pain beyond opioids.      Clinical trial of TMS (RECODE study) referral  Discuss SCS trial with Juan Huber MD  Department of Neurosurgery  [facility]

## 2024-11-13 ENCOUNTER — LAB (OUTPATIENT)
Dept: LAB | Facility: CLINIC | Age: 54
End: 2024-11-13
Payer: COMMERCIAL

## 2024-11-13 DIAGNOSIS — Z86.39 HISTORY OF THYROID DISEASE: ICD-10-CM

## 2024-11-13 DIAGNOSIS — J06.9 UPPER RESPIRATORY TRACT INFECTION, UNSPECIFIED TYPE: ICD-10-CM

## 2024-11-13 DIAGNOSIS — E55.9 VITAMIN D DEFICIENCY: ICD-10-CM

## 2024-11-13 LAB
ALBUMIN SERPL BCG-MCNC: 4.3 G/DL (ref 3.5–5.2)
ALP SERPL-CCNC: 97 U/L (ref 40–150)
ALT SERPL W P-5'-P-CCNC: 13 U/L (ref 0–50)
ANION GAP SERPL CALCULATED.3IONS-SCNC: 13 MMOL/L (ref 7–15)
AST SERPL W P-5'-P-CCNC: 21 U/L (ref 0–45)
BASOPHILS # BLD AUTO: 0 10E3/UL (ref 0–0.2)
BASOPHILS NFR BLD AUTO: 0 %
BILIRUB SERPL-MCNC: 0.6 MG/DL
BUN SERPL-MCNC: 7.5 MG/DL (ref 6–20)
CALCIUM SERPL-MCNC: 9.3 MG/DL (ref 8.8–10.4)
CHLORIDE SERPL-SCNC: 105 MMOL/L (ref 98–107)
CREAT SERPL-MCNC: 0.92 MG/DL (ref 0.51–0.95)
EGFRCR SERPLBLD CKD-EPI 2021: 75 ML/MIN/1.73M2
EOSINOPHIL # BLD AUTO: 0.2 10E3/UL (ref 0–0.7)
EOSINOPHIL NFR BLD AUTO: 3 %
ERYTHROCYTE [DISTWIDTH] IN BLOOD BY AUTOMATED COUNT: 12.5 % (ref 10–15)
GLUCOSE SERPL-MCNC: 77 MG/DL (ref 70–99)
HCO3 SERPL-SCNC: 22 MMOL/L (ref 22–29)
HCT VFR BLD AUTO: 37.5 % (ref 35–47)
HGB BLD-MCNC: 12.8 G/DL (ref 11.7–15.7)
IMM GRANULOCYTES # BLD: 0 10E3/UL
IMM GRANULOCYTES NFR BLD: 0 %
LYMPHOCYTES # BLD AUTO: 1.9 10E3/UL (ref 0.8–5.3)
LYMPHOCYTES NFR BLD AUTO: 39 %
MCH RBC QN AUTO: 30.2 PG (ref 26.5–33)
MCHC RBC AUTO-ENTMCNC: 34.1 G/DL (ref 31.5–36.5)
MCV RBC AUTO: 88 FL (ref 78–100)
MONOCYTES # BLD AUTO: 0.4 10E3/UL (ref 0–1.3)
MONOCYTES NFR BLD AUTO: 9 %
NEUTROPHILS # BLD AUTO: 2.4 10E3/UL (ref 1.6–8.3)
NEUTROPHILS NFR BLD AUTO: 49 %
PLATELET # BLD AUTO: 225 10E3/UL (ref 150–450)
POTASSIUM SERPL-SCNC: 4 MMOL/L (ref 3.4–5.3)
PROT SERPL-MCNC: 6.7 G/DL (ref 6.4–8.3)
RBC # BLD AUTO: 4.24 10E6/UL (ref 3.8–5.2)
SODIUM SERPL-SCNC: 140 MMOL/L (ref 135–145)
T4 FREE SERPL-MCNC: 1.29 NG/DL (ref 0.9–1.7)
TSH SERPL DL<=0.005 MIU/L-ACNC: 1.65 UIU/ML (ref 0.3–4.2)
VIT D+METAB SERPL-MCNC: 42 NG/ML (ref 20–50)
WBC # BLD AUTO: 5 10E3/UL (ref 4–11)

## 2024-11-13 PROCEDURE — 85025 COMPLETE CBC W/AUTO DIFF WBC: CPT

## 2024-11-13 PROCEDURE — 82306 VITAMIN D 25 HYDROXY: CPT

## 2024-11-13 PROCEDURE — 86376 MICROSOMAL ANTIBODY EACH: CPT

## 2024-11-13 PROCEDURE — 84439 ASSAY OF FREE THYROXINE: CPT

## 2024-11-13 PROCEDURE — 36415 COLL VENOUS BLD VENIPUNCTURE: CPT

## 2024-11-13 PROCEDURE — 84443 ASSAY THYROID STIM HORMONE: CPT

## 2024-11-13 PROCEDURE — 80053 COMPREHEN METABOLIC PANEL: CPT

## 2024-11-14 LAB — THYROPEROXIDASE AB SERPL-ACNC: 15 IU/ML

## 2024-11-19 ENCOUNTER — MYC REFILL (OUTPATIENT)
Dept: ANESTHESIOLOGY | Facility: CLINIC | Age: 54
End: 2024-11-19

## 2024-11-19 DIAGNOSIS — M96.1 POSTLAMINECTOMY SYNDROME: ICD-10-CM

## 2024-11-19 DIAGNOSIS — M96.1 FAILED BACK SURGICAL SYNDROME: ICD-10-CM

## 2024-11-20 DIAGNOSIS — M96.1 POSTLAMINECTOMY SYNDROME: ICD-10-CM

## 2024-11-20 RX ORDER — CYCLOBENZAPRINE HCL 10 MG
10 TABLET ORAL 3 TIMES DAILY PRN
Qty: 60 TABLET | Refills: 0 | Status: SHIPPED | OUTPATIENT
Start: 2024-11-20

## 2024-11-20 RX ORDER — TRAMADOL HYDROCHLORIDE 50 MG/1
50 TABLET ORAL EVERY 6 HOURS PRN
Qty: 65 TABLET | Refills: 0 | Status: SHIPPED | OUTPATIENT
Start: 2024-11-20

## 2024-11-20 RX ORDER — HYDROMORPHONE HYDROCHLORIDE 2 MG/1
2 TABLET ORAL 2 TIMES DAILY
Qty: 15 TABLET | Refills: 0 | Status: SHIPPED | OUTPATIENT
Start: 2024-11-20

## 2024-11-20 NOTE — TELEPHONE ENCOUNTER
Refill request      Medication/Sig:   HYDROmorphone (DILAUDID) 2 MG tablet-Take 1 tablet (2 mg) by mouth 2 times daily     traMADol (ULTRAM) 50 MG tablet-TAKE ONE TABLET BY MOUTH EVERY 6 HOURS AS NEEDED FOR PAIN    Dispensed/Refills:  HYDROmorphone-15/0    Tramadol-65/0    SOLD to the pt on:  Tramadol-10/25/24    HYDROmorphone-8/19/24    Last clinic appointment: 8/19/24  Next clinic appointment: none listed    Last Drug Screen Collected: 5/25/23  Controlled Substance Agreement signed: none found      Preferred pharmacy:    [place], [place] - [address] [place] [address]    Refill request routed to the provider to review.

## 2024-11-21 DIAGNOSIS — M79.604 LOW BACK PAIN RADIATING TO BOTH LEGS: Primary | ICD-10-CM

## 2024-11-21 DIAGNOSIS — M79.605 LOW BACK PAIN RADIATING TO BOTH LEGS: Primary | ICD-10-CM

## 2024-11-21 DIAGNOSIS — M54.50 LOW BACK PAIN RADIATING TO BOTH LEGS: Primary | ICD-10-CM

## 2024-12-02 DIAGNOSIS — M96.1 POSTLAMINECTOMY SYNDROME: ICD-10-CM

## 2024-12-02 RX ORDER — BUPRENORPHINE 10 UG/H
PATCH TRANSDERMAL WEEKLY
Qty: 4 PATCH | Refills: 1 | Status: SHIPPED | OUTPATIENT
Start: 2024-12-02

## 2024-12-02 NOTE — TELEPHONE ENCOUNTER
Refill request    Medication: buprenorphine (BUTRANS) 10 MCG/HR WK patch     Sig: Place onto the skin once a week     Dispensed: 4  Refills: 1  SOLD to the pt on: 11/10/24     Last clinic appointment: 8/19/24  Next clinic appointment: none listed    Last Drug Screen Collected: 5/25/23  Controlled Substance Agreement signed: none found    Preferred pharmacy:    [address]    Refill request routed to the provider to review.

## 2024-12-06 ENCOUNTER — MYC MEDICAL ADVICE (OUTPATIENT)
Dept: INTERNAL MEDICINE | Facility: CLINIC | Age: 54
End: 2024-12-06
Payer: MEDICARE

## 2024-12-06 NOTE — TELEPHONE ENCOUNTER
WEGOVY 1.7MG/0.75ML SOAJ       Last Written Prescription Date:  10-28-24  Last Fill Quantity: 3 ml,   # refills: 0  Last Office Visit : 10-31-24  Future Office visit:  1-31-25    Routing refill request to provider for review/approval because:  Med not on protocol for associated diagnosis  BMI 31.0-31.9,adult [Z68.31]  - Primary

## 2024-12-09 ENCOUNTER — DOCUMENTATION ONLY (OUTPATIENT)
Dept: NEUROSURGERY | Facility: CLINIC | Age: 54
End: 2024-12-09
Payer: MEDICARE

## 2024-12-09 NOTE — PROGRESS NOTES
Open lumbar spine MRI order faxed to [facility] at [phone] per pt request. Pt face sheet, my contact information included on the fax cover.

## 2024-12-10 RX ORDER — SEMAGLUTIDE 1.7 MG/.75ML
INJECTION, SOLUTION SUBCUTANEOUS
Qty: 3 ML | Refills: 0 | OUTPATIENT
Start: 2024-12-10

## 2024-12-17 ENCOUNTER — DOCUMENTATION ONLY (OUTPATIENT)
Dept: NEUROSURGERY | Facility: CLINIC | Age: 54
End: 2024-12-17

## 2024-12-17 DIAGNOSIS — R20.0 RIGHT UPPER EXTREMITY NUMBNESS: ICD-10-CM

## 2024-12-17 DIAGNOSIS — R20.0 LEFT UPPER EXTREMITY NUMBNESS: Primary | ICD-10-CM

## 2024-12-17 DIAGNOSIS — M96.1 POSTLAMINECTOMY SYNDROME: ICD-10-CM

## 2024-12-17 NOTE — PROGRESS NOTES
Upright cervical spine MRI order faxed to [facility] at [phone] per pt request. Pt face sheet, my contact information, return fax for report, and request to push imaging over included on the fax cover.

## 2024-12-18 RX ORDER — CYCLOBENZAPRINE HCL 10 MG
10 TABLET ORAL 3 TIMES DAILY PRN
Qty: 60 TABLET | Refills: 0 | Status: SHIPPED | OUTPATIENT
Start: 2024-12-18

## 2024-12-20 ENCOUNTER — TRANSFERRED RECORDS (OUTPATIENT)
Dept: HEALTH INFORMATION MANAGEMENT | Facility: CLINIC | Age: 54
End: 2024-12-20
Payer: MEDICARE

## 2024-12-23 ENCOUNTER — DOCUMENTATION ONLY (OUTPATIENT)
Dept: NEUROSURGERY | Facility: CLINIC | Age: 54
End: 2024-12-23
Payer: MEDICARE

## 2024-12-30 ENCOUNTER — DOCUMENTATION ONLY (OUTPATIENT)
Dept: NEUROSURGERY | Facility: CLINIC | Age: 54
End: 2024-12-30
Payer: MEDICARE

## 2024-12-31 ENCOUNTER — OFFICE VISIT (OUTPATIENT)
Dept: NEUROLOGY | Facility: CLINIC | Age: 54
End: 2024-12-31
Payer: COMMERCIAL

## 2024-12-31 DIAGNOSIS — G43.711 INTRACTABLE CHRONIC MIGRAINE WITHOUT AURA AND WITH STATUS MIGRAINOSUS: Primary | ICD-10-CM

## 2024-12-31 PROCEDURE — 64615 CHEMODENERV MUSC MIGRAINE: CPT | Performed by: PSYCHIATRY & NEUROLOGY

## 2024-12-31 ASSESSMENT — HEADACHE IMPACT TEST (HIT 6)
WHEN YOU HAVE A HEADACHE HOW OFTEN DO YOU WISH YOU COULD LIE DOWN: ALWAYS
WHEN YOU HAVE HEADACHES HOW OFTEN IS THE PAIN SEVERE: ALWAYS
HIT6 TOTAL SCORE: 78
HOW OFTEN DO HEADACHES LIMIT YOUR DAILY ACTIVITIES: ALWAYS
HOW OFTEN DID HEADACHS LIMIT CONCENTRATION ON WORK OR DAILY ACTIVITY: ALWAYS
HOW OFTEN HAVE YOU FELT FED UP OR IRRITATED BECAUSE OF YOUR HEADACHES: ALWAYS
HOW OFTEN HAVE YOU FELT TOO TIRED TO WORK BECAUSE OF YOUR HEADACHES: ALWAYS

## 2024-12-31 NOTE — LETTER
12/31/2024       RE: Ada Welch  [address] [address]     Dear Colleague,    Thank you for referring your patient, Ada Welch, to the [facility] [place] [facility]. Please see a copy of my visit note below.    Botulinum Toxin Procedure Note     Ada Welch  [id number]     Ordering provider: Noemy Nguyen MD     The procedure was explained to the patient. Benefits of the treatment were discussed including headache and migraine reduction. Risks of the procedure were reviewed including but not limited to pain, bruising, bleeding, infection, weakness of muscles injected or those distal to injection. The patient voiced understanding of the risks and benefits. All questions answered and the patient consented to proceed.      Prior to receiving Botox injections the patient reported the following:  Baseline headache/migraine frequency: more than 15 days a month  Baseline headache/migraine duration: more than 4 hours  Associated migrainous features: wavy vision distortion, nausea, photophobia     Previous treatment trials:  amitriptyline, zonisamide, gabapentin, Aimovig, Emgality, duloxetine, tizanidine, propranolol     Last Botox procedure: 10/8/2024  Current migraine frequency: Typically more than 50% reduction in migraines (less than 8 days a month of migraine). Has had a flare since having COVID for over 30 days but this is expected ot improve over the next few months and would likely be worse if she did not receive Botox.   Current migraine duration: 1 day     No evidence that the patient has medication overuse headache this has been ruled out. Stopped using triptans not helping. Daily using metoclopramide. Not using Ketorolac or Butalbital.    Using ibuprofen and Tylenol daily.      Functional improvements since starting botulinum toxin treatments: In addition she notes that she is able to be more functional  and interactive with her family.      Last Neurology office visit: 2/17/2024, 5/21/2024     A 200 unit vial of Botox was diluted with 4ml of 0.9% sodium chloride     Botox lot # and expiration date: See MAR for details  0.9% sodium chloride lot # and expiration date: See MAR for details     The following muscles were injected using a 30 gauge needle:  Procerus 1 site 5 units   2 sites (bilat) 10 units  Frontalis 4 sites (bilat) 20 units ( slight eyebrow droop on right, Botox shift up and lateral)  Temporalis 8 sites (bilat) 40 units  **Trapezius muscles 4 sites (bilat) 10 units (lateral and reduced)  Occipitalis 8 sites (bilat) 40 units  **Splenius capitus 2 sites (bilat) 10 units (lateral and reduced)  Masseter 2 sites (bilat) 20 units     A total of 155 units were injected, 45 units wasted.   The patient tolerated the injections well. I was present for and performed the entire procedure.      Noemy Nguyen MD    Neurology Progress Note    [facility]    Ada Welch MRN# [id number]   Age: 54 year old YOB: 1970     PCP: Ellyn Rivera            Assessment and Plan:     (G43.711) Intractable chronic migraine without aura and withstatus migrainosus  (primary encounter diagnosis)  Comment: Currently in status migrainosus.   Plan:   Reordered ubrogepant (UBRELVY) 100 MG tablet for acute rescue in an attempt to avoid medication overuse but have something to take for migraine  Repeat Botox and I have asked her to provide an update in 6 weeks. If no better consider Vypeti.           Noemy Nguyen MD           History of Present Illness:   CC: Recheck    Ada is a 54 year old woman who comes in today for Botox procedure. She reports she is miserable with unrelenting status migrainosus for the past 3 months. SHe has been thoroughly assessed with imaging to look for spinal fluid leak as well as a blind blood patch with no findings. She did get worse due to COVID but status migrainosus  actually began before.     We discussed today that we will try Botox one more time but if no better we will stop due to lack of effectiveness.     She is using OTCs and butalbital minimally. Rizatriptan doesn't help. In the past she tried Nurtec without benefit. She doesn't remember trying Ubrelvy.     She is taking propranolol, Cymbalta in addition. She has narcotics prescribed but she reports she isn't using them because they don't help and she knows the concern of overuse.     She has seen Dr Huber to discuss a spinal stimulator.          Physical Exam:     Awake, alert, reporting significant pain  No aphasia or dysarthria.   CN 2-12 intact  Poor posture but neck is supple and range of motion is normal.          Pertinent History/Data for appointment:         Again, thank you for allowing me to participate in the care of your patient.      Sincerely,    Noemy Nguyen MD

## 2024-12-31 NOTE — PROGRESS NOTES
Neurology Progress Note    [facility]    Ada Welch MRN# [id number]   Age: 54 year old YOB: 1970     PCP: Ellyn Rivera            Assessment and Plan:     (C29.417) Intractable chronic migraine without aura and withstatus migrainosus  (primary encounter diagnosis)  Comment: Currently in status migrainosus.   Plan:   Reordered ubrogepant (UBRELVY) 100 MG tablet for acute rescue in an attempt to avoid medication overuse but have something to take for migraine  Repeat Botox and I have asked her to provide an update in 6 weeks. If no better consider Vypeti.           Noemy Nguyen MD           History of Present Illness:   CC: Recheck    Ada is a 54 year old woman who comes in today for Botox procedure. She reports she is miserable with unrelenting status migrainosus for the past 3 months. SHe has been thoroughly assessed with imaging to look for spinal fluid leak as well as a blind blood patch with no findings. She did get worse due to COVID but status migrainosus actually began before.     We discussed today that we will try Botox one more time but if no better we will stop due to lack of effectiveness.     She is using OTCs and butalbital minimally. Rizatriptan doesn't help. In the past she tried Nurtec without benefit. She doesn't remember trying Ubrelvy.     She is taking propranolol, Cymbalta in addition. She has narcotics prescribed but she reports she isn't using them because they don't help and she knows the concern of overuse.     She has seen Dr Huber to discuss a spinal stimulator.          Physical Exam:     Awake, alert, reporting significant pain  No aphasia or dysarthria.   CN 2-12 intact  Poor posture but neck is supple and range of motion is normal.          Pertinent History/Data for appointment:

## 2024-12-31 NOTE — PROGRESS NOTES
Botulinum Toxin Procedure Note     Ada Welch  [id number]     Ordering provider: Noemy Nguyen MD     The procedure was explained to the patient. Benefits of the treatment were discussed including headache and migraine reduction. Risks of the procedure were reviewed including but not limited to pain, bruising, bleeding, infection, weakness of muscles injected or those distal to injection. The patient voiced understanding of the risks and benefits. All questions answered and the patient consented to proceed.      Prior to receiving Botox injections the patient reported the following:  Baseline headache/migraine frequency: more than 15 days a month  Baseline headache/migraine duration: more than 4 hours  Associated migrainous features: wavy vision distortion, nausea, photophobia     Previous treatment trials:  amitriptyline, zonisamide, gabapentin, Aimovig, Emgality, duloxetine, tizanidine, propranolol     Last Botox procedure: 10/8/2024  Current migraine frequency: Typically more than 50% reduction in migraines (less than 8 days a month of migraine). Has had a flare since having COVID for over 30 days but this is expected ot improve over the next few months and would likely be worse if she did not receive Botox.   Current migraine duration: 1 day     No evidence that the patient has medication overuse headache this has been ruled out. Stopped using triptans not helping. Daily using metoclopramide. Not using Ketorolac or Butalbital.    Using ibuprofen and Tylenol daily.      Functional improvements since starting botulinum toxin treatments: In addition she notes that she is able to be more functional and interactive with her family.      Last Neurology office visit: 2/17/2024, 5/21/2024     A 200 unit vial of Botox was diluted with 4ml of 0.9% sodium chloride     Botox lot # and expiration date: See MAR for details  0.9% sodium chloride lot # and expiration date: See MAR for details     The following muscles  were injected using a 30 gauge needle:  Procerus 1 site 5 units   2 sites (bilat) 10 units  Frontalis 4 sites (bilat) 20 units ( slight eyebrow droop on right, Botox shift up and lateral)  Temporalis 8 sites (bilat) 40 units  **Trapezius muscles 4 sites (bilat) 10 units (lateral and reduced)  Occipitalis 8 sites (bilat) 40 units  **Splenius capitus 2 sites (bilat) 10 units (lateral and reduced)  Masseter 2 sites (bilat) 20 units     A total of 155 units were injected, 45 units wasted.   The patient tolerated the injections well. I was present for and performed the entire procedure.      Noemy Nguyen MD

## 2025-01-02 NOTE — TELEPHONE ENCOUNTER
PHENTERMINE HCL 30MG CAPS       Last Written Prescription Date:  10-11-24  Last Fill Quantity: 30,   # refills: 2  Last Office Visit : 10-31-24  Future Office visit:  -1-31-25    Routing refill request to provider for review/approval because:  Drug not on the [facility], [facility] or [facility] refill protocol or controlled substance

## 2025-01-03 ENCOUNTER — MYC MEDICAL ADVICE (OUTPATIENT)
Dept: INTERNAL MEDICINE | Facility: CLINIC | Age: 55
End: 2025-01-03
Payer: MEDICARE

## 2025-01-07 RX ORDER — PHENTERMINE HYDROCHLORIDE 30 MG/1
CAPSULE ORAL
Qty: 30 CAPSULE | Refills: 0 | Status: SHIPPED | OUTPATIENT
Start: 2025-01-10

## 2025-01-08 ENCOUNTER — TELEPHONE (OUTPATIENT)
Dept: CARDIOLOGY | Facility: CLINIC | Age: 55
End: 2025-01-08
Payer: MEDICARE

## 2025-01-13 DIAGNOSIS — G90.9 AUTONOMIC DYSFUNCTION: ICD-10-CM

## 2025-01-13 RX ORDER — PROPRANOLOL HYDROCHLORIDE 120 MG/1
120 CAPSULE, EXTENDED RELEASE ORAL DAILY
Qty: 90 CAPSULE | Refills: 3 | Status: SHIPPED | OUTPATIENT
Start: 2025-01-13

## 2025-01-13 NOTE — TELEPHONE ENCOUNTER
RX Authorization    Medication: propranolol ER (INDERAL LA) 120 MG 24 hr capsule     Date last refill ordered: 02/17/2024    Quantity ordered: 90    # refills: 3    Date of last clinic visit with ordering provider: 05/21/2024    Date of next clinic visit with ordering provider: Only Botox Scheduled

## 2025-01-20 DIAGNOSIS — M96.1 FAILED BACK SURGICAL SYNDROME: ICD-10-CM

## 2025-01-20 DIAGNOSIS — M96.1 POSTLAMINECTOMY SYNDROME: ICD-10-CM

## 2025-01-20 DIAGNOSIS — F41.9 ANXIETY: ICD-10-CM

## 2025-01-20 DIAGNOSIS — Q79.60 EHLERS-DANLOS SYNDROME: ICD-10-CM

## 2025-01-20 RX ORDER — METHOCARBAMOL 500 MG/1
500 TABLET, FILM COATED ORAL 4 TIMES DAILY
Qty: 30 TABLET | Refills: 0 | Status: SHIPPED | OUTPATIENT
Start: 2025-01-20

## 2025-01-20 RX ORDER — TRAMADOL HYDROCHLORIDE 50 MG/1
50 TABLET ORAL EVERY 6 HOURS PRN
Qty: 65 TABLET | Refills: 0 | Status: SHIPPED | OUTPATIENT
Start: 2025-01-20

## 2025-01-20 RX ORDER — CYCLOBENZAPRINE HCL 10 MG
10 TABLET ORAL 3 TIMES DAILY PRN
Qty: 60 TABLET | Refills: 0 | Status: SHIPPED | OUTPATIENT
Start: 2025-01-20

## 2025-01-20 NOTE — TELEPHONE ENCOUNTER
Refill request    Medication/Sig:   methocarbamol (ROBAXIN) 500 MG tablet-TAKE ONE TABLET BY MOUTH FOUR TIMES A DAY     cyclobenzaprine (FLEXERIL) 10 MG tablet-TAKE ONE TABLET BY MOUTH THREE TIMES A DAY AS NEEDED FOR MUSCLE SPASMS     traMADol (ULTRAM) 50 MG tablet-TAKE 1 TABLET (50 MG) BY MOUTH EVERY 6 HOURS AS NEEDED FOR PAIN     Dispensed/Refills:   methocarbamol-30/0  Cyclobenzaprine-60/0  traMADol-65/0    SOLD to the pt on:   traMADol-1/3/25    Last prescribed to patient:   methocarbamol-12/27/24   Cyclobenzaprine-12/18/24    Last clinic appointment: 8/19/24  Next clinic appointment: 1/28/25    Last Drug Screen Collected: 5/25/23  Controlled Substance Agreement signed: none seen    Preferred pharmacy:    [place], [place] - [address] [place] [address]    Refill request routed to the provider to review.

## 2025-01-27 ENCOUNTER — TELEPHONE (OUTPATIENT)
Dept: INTERNAL MEDICINE | Facility: CLINIC | Age: 55
End: 2025-01-27
Payer: COMMERCIAL

## 2025-01-27 RX ORDER — SEMAGLUTIDE 2.4 MG/.75ML
INJECTION, SOLUTION SUBCUTANEOUS
Qty: 3 ML | Refills: 0 | OUTPATIENT
Start: 2025-01-27

## 2025-01-27 RX ORDER — HYDROXYZINE HYDROCHLORIDE 25 MG/1
25 TABLET, FILM COATED ORAL EVERY 6 HOURS PRN
Qty: 180 TABLET | OUTPATIENT
Start: 2025-01-27

## 2025-01-27 NOTE — TELEPHONE ENCOUNTER
Prior Authorization Retail Medication Request    Medication/Dose: Wegovy    Diagnosis and ICD code (if different than what is on RX):    New/renewal/insurance change PA/secondary ins. PA:  Previously Tried and Failed:    Rationale:      Insurance   Primary: [facility]  Insurance ID:  [id number]  [phone]      Ins prefers Mounjaro or Trulicity    Thank You,  Anne Garcia, [facility]

## 2025-01-27 NOTE — TELEPHONE ENCOUNTER
The [facility] RN called and spoke with Esme at the patient's pharmacy. Esme stated that the patient last had the prescription for hydrOXYzine HCl (ATARAX) 25 MG tablet filled on 11/10/24 for Q=180 (equal to a 45 day supply) and that this prescription was filled again on 1/3/2025 for Q=180 tablets. The RN update Esme that the patient has an appointment coming up with the patient's PCP on 1/31/2025.

## 2025-01-27 NOTE — TELEPHONE ENCOUNTER
Last Written Prescription:  hydrOXYzine HCl (ATARAX) 25 MG tablet  25 mg, EVERY 6 HOURS PRN            Summary: Take 1 tablet (25 mg) by mouth every 6 hours as needed for anxiety., Disp-180 tablet, R-1, E-Prescribe  Dose, Route, Frequency: 25 mg, Oral, EVERY 6 HOURS PRNStart: 11/08/2024Ord/Sold: 11/08/2024 (O)Ordered On: 11/08/2024[facility] [address] Associated: Taking: Long-term: Med Note:              Patient Sig: Take 1 tablet (25 mg) by mouth every 6 hours as needed for anxiety.  Ordering Department: [facility]  Authorized By: Ellyn Rivera MD  Dispense: 180 tablet  Refills: 1 ordered       ----------------------  Last Visit Date: 10/31/24  Future Visit Date: 1/31/25  ----------------------  REFILL DECISION:  Forwarded to provider to assess prn use.     Sonali JOSE RN  P Central Nursing/Red Flag Triage & Med Refill Team

## 2025-01-28 ENCOUNTER — VIRTUAL VISIT (OUTPATIENT)
Dept: ANESTHESIOLOGY | Facility: CLINIC | Age: 55
End: 2025-01-28
Payer: COMMERCIAL

## 2025-01-28 ENCOUNTER — TELEPHONE (OUTPATIENT)
Dept: INTERNAL MEDICINE | Facility: CLINIC | Age: 55
End: 2025-01-28

## 2025-01-28 DIAGNOSIS — M96.1 POSTLAMINECTOMY SYNDROME, LUMBAR REGION: Primary | ICD-10-CM

## 2025-01-28 PROCEDURE — 98006 SYNCH AUDIO-VIDEO EST MOD 30: CPT | Performed by: ANESTHESIOLOGY

## 2025-01-28 ASSESSMENT — PAIN SCALES - GENERAL: PAINLEVEL_OUTOF10: SEVERE PAIN (7)

## 2025-01-28 NOTE — PROGRESS NOTES
"[facility] : Progress Note    Date of visit: 1/28/2025    Video call duration 20 minutes    Chief Complaint   Patient presents with    Pain    RECHECK    Pain Management     Follow up-Wanting a \"stimulator\" place.         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 The patient has  complex history of low back pain for ~32 years for which she underwent L4-S1 fusion (TLIF) 12/9/13.  Postoperatively, her back pain worsened (mainly LBP, but also radiated down both lower limbs in an S1 dermatomal distribution).   Underwent removal of posterior instrumentation (screws and guadalupe).  After fusion removal, her pain continued.  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  She even sought care from a EDS specialist in [place].       She has a h/o significant autonomic dysfunction.  Since 2014, she reports that her heart rate has fluctuated from as low as  beats per minutes and her blood pressure also fluctuates at the same time without any aggravating factors.  The fluctuation of the blood pressure and heart rate happens when she is supine, sitting, standing, walking, etc.  She has seen cardiologist Dr. Russell who advised her to continue isometric exercise, low carbohydrate and low gluten diet.  Her blood pressure has been stable now.     She saw Dr. Singh for urinary urgency and incontinence.  She is advised for improving lifestyle, dietary modification, optimizing bowel regimen and to start pelvic physical therapy.     She saw Dr. Moncada for left upper extremity radicular pain along the C8 nerve root distribution.  Per Dr. Moncada's note, she does not have a surgical target which may explain her symptoms.  She still continues to report this symptom.  She had cervical medial branch nerve block x2 with us 50% pain relief.   However medial branch nerve " radiofrequency ablation was not approved by the insurance.  She had 2 cervical epidural steroid injections with moderate pain relief.      The patient saw Dr. Mack for eosinophilic esophagitis, GERD, and dysphagia.    She had open hernia surgery by Dr. Mack in February 2020.  Her postoperative course was uneventful.        The patient saw  Dr. Noemy Nguyen for chronic migraines. Patient reports that her chronic intractable headache pain recently got worse. 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  She had multiple blood patches intermittently for her headache pain.     Recently saw Dr. Enciso for lower back pain secondary to persistent spinal pain after L4-S1 fusion.    12/09/2013 - L4-S1 fusion, TLIF (Dr. Williamson in [place], [place])   2014 - removal of posterior instrumentation (screws and guadalupe) with exploration of fusion (Dr. MIKE Lucero ([facility]  She was recommended  for nonoperative treatment including epidural steroid injection,  medial branch nerve block/radiofrequency ablation of the lumbar MBB, and/or SI joint injections.  If these injections are proven to be ineffective in the past and didn't provide long term relief.  She then saw Dr. Burks who also recommended against surgery.     Currently she has been  managing her pain with stable dose of Butrans 10 mcg per hour q 7 days, tramadol 50 mg twice daily.  In addition she takes tizanidine 4 mg 4 times daily as needed.    She takes sumatriptan for breakthrough migraine.  Occasionally she takes small doses of oxycodone/Dilaudid and bilateral piriformis muscle injection with good pain relief when her pain gets significantly worse.      Today her main complaints are following:  - Overall feels her pain is not optimized. Feels like she's been hit by a bus.   - Struggled this summer. Tough to take care of herself and her home.   - Radiating all over her body. Overall hasn't changed in total character.   - Feels numb from face down into her whole body.  Feels like her brain is being pulled down, constant fog.   - She says she missed her appointment on 8/15 for an SUNSHINE because she took a dilaudid in the morning due to pain and fell asleep.   - She saw Dr. Cecile ARCINIEGA. She's very interested in that. She has a pain psychology appointment scheduled for 9/06/2024. She would also be interested in a pain pump.   - She had an epidural blood patch on 7/2/24 with Dr. Eaton. She thinks it helped with her overall headache presentation.   - The Dilaudid recently given was helpful but when only given one per day, she feels like she has to choose between sleep and daily duties. She keeps her tramadol as a back up.  - She continues with her Butrans. She's wondering if it can be increased when she's in a flare.   - Continues to use celecoxib, lidocaine patches intermittently, medical cannabis daily (balm, gummies, ingestible). Not using methocarbamol, doesn't think it worked. Using her TENS unit. Does lots of herbal treatments.   - Continues to follow with Neurology for migraine management.   -She saw Dr. Huber on 11/12/2024 recommended clinical trial of TMS (RECODE study) and SCS trial.    Interval history  The patient visited today to discuss the possibility of a spinal cord stimulation (SCS) trial for her lower back and hip pain, secondary to failed back surgery syndrome. She also reports significant neck pain due to cervical spondylosis. We discussed the potential benefits of spinal cord stimulation. Additionally, we reviewed the pros and cons of SCS as a treatment option. The patient will send a ViaSat message to us in a week. She has already seen a psychologist and received clearance for the SCS procedure.      [place] Prescription Monitoring Program:   Reviewed. No concerns     Review of Systems:  The 14 system ROS was reviewed and was negative except what is documented above and as follows.  Any bowel or bladder problems: none  Mood: okay    Physical Exam:  There were  no vitals filed for this visit.  Since this is a video visit    General: in no apparent distress.   Lungs: unlabored.   Musculoskeletal: Able to move all extremities  Neurologic exam:Sensation intact throughout all dermatomes bilateral upper extremities and lower extremities  Psychiatric; Normal affect.       Medications:  Current Outpatient Medications   Medication Sig Dispense Refill    acetaminophen (TYLENOL) 500 MG tablet Take 1,000 mg by mouth every 6 hours as needed for mild pain      albuterol (PROAIR HFA/PROVENTIL HFA/VENTOLIN HFA) 108 (90 Base) MCG/ACT inhaler Inhale 2 puffs into the lungs every 6 hours as needed for shortness of breath, wheezing or cough. 18 g 0    atorvastatin (LIPITOR) 20 MG tablet TAKE ONE TABLET BY MOUTH ONCE DAILY 90 tablet 3    benzonatate (TESSALON) 100 MG capsule Take 1 capsule (100 mg) by mouth 3 times daily as needed for cough. 30 capsule 0    benzonatate (TESSALON) 200 MG capsule Take 1 capsule (200 mg) by mouth 3 times daily as needed for cough. 45 capsule 0    budesonide-formoterol (SYMBICORT) 80-4.5 MCG/ACT Inhaler Inhale 2 puffs into the lungs 2 times daily. 10 g 0    buprenorphine (BUTRANS) 10 MCG/HR WK patch Place onto the skin once a week. 4 patch 1    celecoxib (CELEBREX) 100 MG capsule Take 1 capsule (100 mg) by mouth as needed for moderate pain. 60 capsule 2    cetirizine (ZYRTEC) 10 MG tablet Take 10 mg by mouth daily as needed       co-enzyme Q-10 100 MG CAPS capsule Take 100 mg by mouth daily      cyclobenzaprine (FLEXERIL) 10 MG tablet TAKE ONE TABLET BY MOUTH THREE TIMES A DAY AS NEEDED FOR MUSCLE SPASMS 60 tablet 0    DULoxetine (CYMBALTA) 30 MG capsule TAKE 2 CAPSULES BY MOUTH IN THE MORNING AND 1 CAPSULE BY MOUTH AT BEDTIME 270 capsule 1    EPINEPHrine (ANY BX GENERIC EQUIV) 0.3 MG/0.3ML injection 2-pack Inject 0.3 mLs (0.3 mg) into the muscle as needed for anaphylaxis May repeat one time in 5-15 minutes if response to initial dose is inadequate. 2 each 1     ferrous fumarate 65 mg, [place]. FE,-Vitamin C 125 mg (VITRON-C)  MG TABS tablet Take 1 tablet by mouth every other day 60 tablet 4    fluticasone (FLONASE) 50 MCG/ACT nasal spray SPRAY 2 SPRAYS INTO BOTH NOSTRILS DAILY 48 g 11    guanFACINE (TENEX) 1 MG tablet TAKE ONE TAB BY MOUTH ONCE NIGHTLY AT BEDTIME      HYDROmorphone (DILAUDID) 2 MG tablet Take 1 tablet (2 mg) by mouth 2 times daily. 15 tablet 0    hydrOXYzine HCl (ATARAX) 25 MG tablet Take 1 tablet (25 mg) by mouth every 6 hours as needed for anxiety. 180 tablet 1    ibuprofen ([employer] IBUPROFEN) 200 MG capsule Take 600 mg by mouth every 4 hours as needed for fever      ketorolac (TORADOL) 10 MG tablet Take 1 tablet (10 mg) by mouth every 6 hours as needed for moderate pain. 6 tablet 0    Lidocaine (LIDOCARE) 4 % Patch Place 1-3 patches onto the skin every 24 hours To prevent lidocaine toxicity, patient should be patch free for 12 hrs daily. 20 patch 0    medical cannabis (Patient's own supply.  Not a prescription) Take 1 Dose by mouth See Admin Instructions Capsule formulation - uses as needed      methocarbamol (ROBAXIN) 500 MG tablet TAKE ONE TABLET BY MOUTH FOUR TIMES A DAY 30 tablet 0    metoclopramide (REGLAN) 10 MG tablet Take 1 tablet (10 mg) by mouth 4 times daily as needed (headache). 40 tablet 3    metoclopramide (REGLAN) 5 MG tablet Take 1 tablet (5 mg) by mouth 3 times daily as needed (migraine). 12 tablet 0    Multiple Vitamins-Minerals (MULTIVITAMIN PO) Take 1 tablet by mouth daily       naloxone (NARCAN) 4 MG/0.1ML nasal spray Spray 1 spray (4 mg) into one nostril alternating nostrils once as needed for opioid reversal every 2-3 minutes until assistance arrives 0.2 mL 0    phentermine 30 MG capsule TAKE ONE CAPSULE BY MOUTH ONCE  EVERY MORNING. 30 capsule 0    Probiotic Product (PROBIOTIC DAILY PO) Take 6 packets by mouth every morning Five-Lac      propranolol ER (INDERAL LA) 120 MG 24 hr capsule Take 1 capsule (120 mg) by mouth daily. 90  capsule 3    ramelteon (ROZEREM) 8 MG tablet Take 1 tablet (8 mg) by mouth at bedtime. 30 tablet 5    rizatriptan (MAXALT-MLT) 5 MG ODT TAKE 1-2 TABLETS (5-10 MG) BY MOUTH AT ONSET OF HEADACHE FOR MIGRAINE (MAX 30 MG IN 24 HOURS) 18 tablet 11    Semaglutide-Weight Management (WEGOVY) 1.7 MG/0.75ML pen Inject 1.7 mg subcutaneously once a week. 3 mL 0    Semaglutide-Weight Management (WEGOVY) 2.4 MG/0.75ML pen Inject 2.4 mg subcutaneously once a week. 3 mL 0    SUMAtriptan (IMITREX STATDOSE) 6 MG/0.5ML pen injector kit Inject 0.5 ml (6 mg) subcutaneously at onset of migraine, May repeat in 1 hour , Max 12 mg/24 hrs 2 kit 11    topiramate (TOPAMAX) 100 MG tablet Take 1 tablet (100 mg) by mouth daily 90 tablet 3    traMADol (ULTRAM) 50 MG tablet TAKE ONE TABLET BY MOUTH EVERY 6 HOURS AS NEEDED FOR PAIN 65 tablet 0    ubrogepant (UBRELVY) 100 MG tablet Take 1 tablet (100 mg) by mouth at onset of headache (headache). 10 tablet 11    vitamin B complex with vitamin C (STRESS TAB) tablet Take 1 tablet by mouth daily      VITAMIN D, CHOLECALCIFEROL, PO Take 2,000 Units by mouth daily         Analgesic Medications:   Medications related to Pain Management (From now, onward)      None               LABORATORY VALUES:   Recent Labs   Lab Test 11/13/24  1552 06/23/24 2008    137   POTASSIUM 4.0 4.0   CHLORIDE 105 106   CO2 22 23   ANIONGAP 13 8   GLC 77 88   BUN 7.5 14.3   CR 0.92 0.93   JUAN 9.3 8.6       CBC RESULTS:   Recent Labs   Lab Test 11/13/24  1552   WBC 5.0   RBC 4.24   HGB 12.8   HCT 37.5   MCV 88   MCH 30.2   MCHC 34.1   RDW 12.5          Most Recent 3 INR's:  Recent Labs   Lab Test 01/30/20  1325 02/28/19  1045 12/17/18  0920   INR 0.93 0.97 0.90           ASSESSMENT:       Diagnoses         Codes Comments    Postlaminectomy syndrome, lumbar region    -  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             PLAN:    Continue Butrans 10 mics per hour.  4 patches per month.  No medication dispensed today  Tramadol 50 mg daily as  needed.  65 tablets dispensed.  No medication dispensed today    lidocaine 4% patches for the most painful spots on lower back every 12 hours  Continue using medical cannabis  Continue methocarbamol 500 mg 4 times daily as needed.    Sumatriptan/rizatriptan for migraine headache as needed.   Patient had previously stopped amitriptyline due to weight gain  I told patient that Spinal cord stimulation (SCS) has been shown to suppress visceral response to colon distension in an animal model. It may be an effective therapy for chronic pain secondary to chronic pancreatitis but we don't have enough data to recommend this therapy. Previous human study has been shown that the patient who  underwent SCS trial with  2 trial leads placed epidurally via T9-T10 paramedian entry with the tips positioned at T6 vertebral body, had good pain relief. After the completion of successful SCS trial, the patients were implanted with dual octrode leads and rechargeable pulse generator.    Assessment will be ongoing with changes in treatment as indicated.  Benefits/risks/alternatives to treatment have been reviewed and the patient has been instructed to contact this office if they have any questions or concerns.  This plan of care has been discussed with the patient and the patient is in agreement.     Faiza Martinez MD, PHD

## 2025-01-28 NOTE — NURSING NOTE
"How will you be connecting to the visit? MyChart  How would you like to obtain your AVS? MyChart  If the video visit is dropped, the invitation should be resent by: Text to cell phone: [phone]  Will anyone else be joining your video visit? No  Are you currently in the [facility] yes        Patient presents with:  Pain  RECHECK  Pain Management: Follow up-Wanting a \"stimulator\" place.      Severe Pain (7)     Pain Medications       Analgesics Other Refills Start End     acetaminophen (TYLENOL) 500 MG tablet --  --    Sig - Route: Take 1,000 mg by mouth every 6 hours as needed for mild pain - Oral    Class: Historical       Opioid Agonists Refills Start End     HYDROmorphone (DILAUDID) 2 MG tablet 0 11/20/2024 --    Sig - Route: Take 1 tablet (2 mg) by mouth 2 times daily. - Oral    Class: E-Prescribe    Earliest Fill Date: 11/20/2024     traMADol (ULTRAM) 50 MG tablet 0 1/20/2025 --    Sig - Route: TAKE ONE TABLET BY MOUTH EVERY 6 HOURS AS NEEDED FOR PAIN - Oral    Class: E-Prescribe    No prior authorization was found for this prescription.    Found prior authorization for another prescription for the same medication: Closed - Other       Opioid Partial Agonists Refills Start End     buprenorphine (BUTRANS) 10 MCG/HR WK patch 1 12/2/2024 --    Sig - Route: Place onto the skin once a week. - Transdermal    Class: E-Prescribe    No prior authorization was found for this prescription.    Found prior authorization for another prescription for the same medication: Approved            What medications are you using for pain? Tramadol, butrans, tylenol, cyclobenzaprine, methocarbomal.     What refills are you needing today? Tramadol, may need more    Expectations: check in regarding stimulator  Ginny Holland LPN      "

## 2025-01-28 NOTE — TELEPHONE ENCOUNTER
[facility] Call Center    Phone Message    May a detailed message be left on voicemail: yes     Reason for Call: Other: Pt has an appt on Friday on 25 and [place] step [place]  and she is with her mom and she is wondering if that 11 am appt would  eb able to be done virtually? Please advise.      Action Taken: Other: PCC    Travel Screening: Not Applicable     Date of Service: 25

## 2025-01-28 NOTE — LETTER
"1/28/2025       RE: Ada Welch  [address] [facility] [address]     Dear Colleague,    Thank you for referring your patient, Ada Welch, to the [facility] [place] at [facility]. Please see a copy of my visit note below.    [facility] : Progress Note    Date of visit: 1/28/2025    Video call duration 20 minutes    Chief Complaint   Patient presents with     Pain     RECHECK     Pain Management     Follow up-Wanting a \"stimulator\" place.         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 The patient has  complex history of low back pain for ~32 years for which she underwent L4-S1 fusion (TLIF) 12/9/13.  Postoperatively, her back pain worsened (mainly LBP, but also radiated down both lower limbs in an S1 dermatomal distribution).   Underwent removal of posterior instrumentation (screws and guadalupe).  After fusion removal, her pain continued.  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  She even sought care from a EDS specialist in [place].       She has a h/o significant autonomic dysfunction.  Since 2014, she reports that her heart rate has fluctuated from as low as  beats per minutes and her blood pressure also fluctuates at the same time without any aggravating factors.  The fluctuation of the blood pressure and heart rate happens when she is supine, sitting, standing, walking, etc.  She has seen cardiologist Dr. Russell who advised her to continue isometric exercise, low carbohydrate and low gluten diet.  Her blood pressure has been stable now.     She saw Dr. Singh for urinary urgency and incontinence.  She is advised for improving lifestyle, dietary modification, optimizing bowel regimen and to start pelvic physical " therapy.     She saw Dr. Moncada for left upper extremity radicular pain along the C8 nerve root distribution.  Per Dr. Moncada's note, she does not have a surgical target which may explain her symptoms.  She still continues to report this symptom.  She had cervical medial branch nerve block x2 with us 50% pain relief.   However medial branch nerve radiofrequency ablation was not approved by the insurance.  She had 2 cervical epidural steroid injections with moderate pain relief.      The patient saw Dr. Mack for eosinophilic esophagitis, GERD, and dysphagia.    She had open hernia surgery by Dr. Mack in February 2020.  Her postoperative course was uneventful.        The patient saw  Dr. Noemy Nguyen for chronic migraines. Patient reports that her chronic intractable headache pain recently got worse. 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  She had multiple blood patches intermittently for her headache pain.     Recently saw Dr. Enciso for lower back pain secondary to persistent spinal pain after L4-S1 fusion.    12/09/2013 - L4-S1 fusion, TLIF (Dr. Williamson in [place], [place])   2014 - removal of posterior instrumentation (screws and guadalupe) with exploration of fusion (Dr. MIKE Lucero ([facility]))  She was recommended  for nonoperative treatment including epidural steroid injection,  medial branch nerve block/radiofrequency ablation of the lumbar MBB, and/or SI joint injections.  If these injections are proven to be ineffective in the past and didn't provide long term relief.  She then saw Dr. Burks who also recommended against surgery.     Currently she has been  managing her pain with stable dose of Butrans 10 mcg per hour q 7 days, tramadol 50 mg twice daily.  In addition she takes tizanidine 4 mg 4 times daily as needed.    She takes sumatriptan for breakthrough migraine.  Occasionally she takes small doses of oxycodone/Dilaudid and bilateral piriformis muscle injection with good pain relief when her pain gets  significantly worse.      Today her main complaints are following:  - Overall feels her pain is not optimized. Feels like she's been hit by a bus.   - Struggled this summer. Tough to take care of herself and her home.   - Radiating all over her body. Overall hasn't changed in total character.   - Feels numb from face down into her whole body. Feels like her brain is being pulled down, constant fog.   - She says she missed her appointment on 8/15 for an SUNSHINE because she took a dilaudid in the morning due to pain and fell asleep.   - She saw Dr. Cecile ARCINIEGA. She's very interested in that. She has a pain psychology appointment scheduled for 9/06/2024. She would also be interested in a pain pump.   - She had an epidural blood patch on 7/2/24 with Dr. Eaton. She thinks it helped with her overall headache presentation.   - The Dilaudid recently given was helpful but when only given one per day, she feels like she has to choose between sleep and daily duties. She keeps her tramadol as a back up.  - She continues with her Butrans. She's wondering if it can be increased when she's in a flare.   - Continues to use celecoxib, lidocaine patches intermittently, medical cannabis daily (balm, gummies, ingestible). Not using methocarbamol, doesn't think it worked. Using her TENS unit. Does lots of herbal treatments.   - Continues to follow with Neurology for migraine management.   -She saw Dr. Huber on 11/12/2024 recommended clinical trial of TMS (RECODE study) and SCS trial.    Interval history  The patient visited today to discuss the possibility of a spinal cord stimulation (SCS) trial for her lower back and hip pain, secondary to failed back surgery syndrome. She also reports significant neck pain due to cervical spondylosis. We discussed the potential benefits of spinal cord stimulation. Additionally, we reviewed the pros and cons of SCS as a treatment option. The patient will send a [employer] message to us in a week. She has  already seen a psychologist and received clearance for the SCS procedure.      [place] Prescription Monitoring Program:   Reviewed. No concerns     Review of Systems:  The 14 system ROS was reviewed and was negative except what is documented above and as follows.  Any bowel or bladder problems: none  Mood: okay    Physical Exam:  There were no vitals filed for this visit.  Since this is a video visit    General: in no apparent distress.   Lungs: unlabored.   Musculoskeletal: Able to move all extremities  Neurologic exam:Sensation intact throughout all dermatomes bilateral upper extremities and lower extremities  Psychiatric; Normal affect.       Medications:  Current Outpatient Medications   Medication Sig Dispense Refill     acetaminophen (TYLENOL) 500 MG tablet Take 1,000 mg by mouth every 6 hours as needed for mild pain       albuterol (PROAIR HFA/PROVENTIL HFA/VENTOLIN HFA) 108 (90 Base) MCG/ACT inhaler Inhale 2 puffs into the lungs every 6 hours as needed for shortness of breath, wheezing or cough. 18 g 0     atorvastatin (LIPITOR) 20 MG tablet TAKE ONE TABLET BY MOUTH ONCE DAILY 90 tablet 3     benzonatate (TESSALON) 100 MG capsule Take 1 capsule (100 mg) by mouth 3 times daily as needed for cough. 30 capsule 0     benzonatate (TESSALON) 200 MG capsule Take 1 capsule (200 mg) by mouth 3 times daily as needed for cough. 45 capsule 0     budesonide-formoterol (SYMBICORT) 80-4.5 MCG/ACT Inhaler Inhale 2 puffs into the lungs 2 times daily. 10 g 0     buprenorphine (BUTRANS) 10 MCG/HR WK patch Place onto the skin once a week. 4 patch 1     celecoxib (CELEBREX) 100 MG capsule Take 1 capsule (100 mg) by mouth as needed for moderate pain. 60 capsule 2     cetirizine (ZYRTEC) 10 MG tablet Take 10 mg by mouth daily as needed        co-enzyme Q-10 100 MG CAPS capsule Take 100 mg by mouth daily       cyclobenzaprine (FLEXERIL) 10 MG tablet TAKE ONE TABLET BY MOUTH THREE TIMES A DAY AS NEEDED FOR MUSCLE SPASMS 60 tablet  0     DULoxetine (CYMBALTA) 30 MG capsule TAKE 2 CAPSULES BY MOUTH IN THE MORNING AND 1 CAPSULE BY MOUTH AT BEDTIME 270 capsule 1     EPINEPHrine (ANY BX GENERIC EQUIV) 0.3 MG/0.3ML injection 2-pack Inject 0.3 mLs (0.3 mg) into the muscle as needed for anaphylaxis May repeat one time in 5-15 minutes if response to initial dose is inadequate. 2 each 1     ferrous fumarate 65 mg, Nikolai. FE,-Vitamin C 125 mg (VITRON-C)  MG TABS tablet Take 1 tablet by mouth every other day 60 tablet 4     fluticasone (FLONASE) 50 MCG/ACT nasal spray SPRAY 2 SPRAYS INTO BOTH NOSTRILS DAILY 48 g 11     guanFACINE (TENEX) 1 MG tablet TAKE ONE TAB BY MOUTH ONCE NIGHTLY AT BEDTIME       HYDROmorphone (DILAUDID) 2 MG tablet Take 1 tablet (2 mg) by mouth 2 times daily. 15 tablet 0     hydrOXYzine HCl (ATARAX) 25 MG tablet Take 1 tablet (25 mg) by mouth every 6 hours as needed for anxiety. 180 tablet 1     ibuprofen ([employer] IBUPROFEN) 200 MG capsule Take 600 mg by mouth every 4 hours as needed for fever       ketorolac (TORADOL) 10 MG tablet Take 1 tablet (10 mg) by mouth every 6 hours as needed for moderate pain. 6 tablet 0     Lidocaine (LIDOCARE) 4 % Patch Place 1-3 patches onto the skin every 24 hours To prevent lidocaine toxicity, patient should be patch free for 12 hrs daily. 20 patch 0     medical cannabis (Patient's own supply.  Not a prescription) Take 1 Dose by mouth See Admin Instructions Capsule formulation - uses as needed       methocarbamol (ROBAXIN) 500 MG tablet TAKE ONE TABLET BY MOUTH FOUR TIMES A DAY 30 tablet 0     metoclopramide (REGLAN) 10 MG tablet Take 1 tablet (10 mg) by mouth 4 times daily as needed (headache). 40 tablet 3     metoclopramide (REGLAN) 5 MG tablet Take 1 tablet (5 mg) by mouth 3 times daily as needed (migraine). 12 tablet 0     Multiple Vitamins-Minerals (MULTIVITAMIN PO) Take 1 tablet by mouth daily        naloxone (NARCAN) 4 MG/0.1ML nasal spray Spray 1 spray (4 mg) into one nostril alternating  nostrils once as needed for opioid reversal every 2-3 minutes until assistance arrives 0.2 mL 0     phentermine 30 MG capsule TAKE ONE CAPSULE BY MOUTH ONCE  EVERY MORNING. 30 capsule 0     Probiotic Product (PROBIOTIC DAILY PO) Take 6 packets by mouth every morning Five-Lac       propranolol ER (INDERAL LA) 120 MG 24 hr capsule Take 1 capsule (120 mg) by mouth daily. 90 capsule 3     ramelteon (ROZEREM) 8 MG tablet Take 1 tablet (8 mg) by mouth at bedtime. 30 tablet 5     rizatriptan (MAXALT-MLT) 5 MG ODT TAKE 1-2 TABLETS (5-10 MG) BY MOUTH AT ONSET OF HEADACHE FOR MIGRAINE (MAX 30 MG IN 24 HOURS) 18 tablet 11     Semaglutide-Weight Management (WEGOVY) 1.7 MG/0.75ML pen Inject 1.7 mg subcutaneously once a week. 3 mL 0     Semaglutide-Weight Management (WEGOVY) 2.4 MG/0.75ML pen Inject 2.4 mg subcutaneously once a week. 3 mL 0     SUMAtriptan (IMITREX STATDOSE) 6 MG/0.5ML pen injector kit Inject 0.5 ml (6 mg) subcutaneously at onset of migraine, May repeat in 1 hour , Max 12 mg/24 hrs 2 kit 11     topiramate (TOPAMAX) 100 MG tablet Take 1 tablet (100 mg) by mouth daily 90 tablet 3     traMADol (ULTRAM) 50 MG tablet TAKE ONE TABLET BY MOUTH EVERY 6 HOURS AS NEEDED FOR PAIN 65 tablet 0     ubrogepant (UBRELVY) 100 MG tablet Take 1 tablet (100 mg) by mouth at onset of headache (headache). 10 tablet 11     vitamin B complex with vitamin C (STRESS TAB) tablet Take 1 tablet by mouth daily       VITAMIN D, CHOLECALCIFEROL, PO Take 2,000 Units by mouth daily         Analgesic Medications:   Medications related to Pain Management (From now, onward)      None               LABORATORY VALUES:   Recent Labs   Lab Test 11/13/24  1552 06/23/24 2008    137   POTASSIUM 4.0 4.0   CHLORIDE 105 106   CO2 22 23   ANIONGAP 13 8   GLC 77 88   BUN 7.5 14.3   CR 0.92 0.93   JUAN 9.3 8.6       CBC RESULTS:   Recent Labs   Lab Test 11/13/24  1552   WBC 5.0   RBC 4.24   HGB 12.8   HCT 37.5   MCV 88   MCH 30.2   MCHC 34.1   RDW 12.5           Most Recent 3 INR's:  Recent Labs   Lab Test 01/30/20  1325 02/28/19  1045 12/17/18  0920   INR 0.93 0.97 0.90           ASSESSMENT:       Diagnoses         Codes Comments    Postlaminectomy syndrome, lumbar region    -  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             PLAN:    Continue Butrans 10 mics per hour.  4 patches per month.  No medication dispensed today  Tramadol 50 mg daily as needed.  65 tablets dispensed.  No medication dispensed today    lidocaine 4% patches for the most painful spots on lower back every 12 hours  Continue using medical cannabis  Continue methocarbamol 500 mg 4 times daily as needed.    Sumatriptan/rizatriptan for migraine headache as needed.   Patient had previously stopped amitriptyline due to weight gain  I told patient that Spinal cord stimulation (SCS) has been shown to suppress visceral response to colon distension in an animal model. It may be an effective therapy for chronic pain secondary to chronic pancreatitis but we don't have enough data to recommend this therapy. Previous human study has been shown that the patient who  underwent SCS trial with  2 trial leads placed epidurally via T9-T10 paramedian entry with the tips positioned at T6 vertebral body, had good pain relief. After the completion of successful SCS trial, the patients were implanted with dual octrode leads and rechargeable pulse generator.    Assessment will be ongoing with changes in treatment as indicated.  Benefits/risks/alternatives to treatment have been reviewed and the patient has been instructed to contact this office if they have any questions or concerns.  This plan of care has been discussed with the patient and the patient is in agreement.     Faiza Martinez MD, PHD      Again, thank you for allowing me to participate in the care of your patient.      Sincerely,    Faiza Martinez MD

## 2025-01-29 NOTE — TELEPHONE ENCOUNTER
SECONDARY INSURANCE -      Retail Pharmacy Prior Authorization Team   Phone: [phone]    PA Initiation    Medication: Wegovy    Insurance Company: [employer] [place] - Phone [phone] Fax [phone]  Pharmacy Filling the Rx: [place], [place] - [address] [place] [address]  Filling Pharmacy Phone: [phone]  Filling Pharmacy Fax: [phone]  Start Date: 1/29/2025

## 2025-01-30 ENCOUNTER — TELEPHONE (OUTPATIENT)
Dept: INTERNAL MEDICINE | Facility: CLINIC | Age: 55
End: 2025-01-30

## 2025-01-30 ENCOUNTER — MYC REFILL (OUTPATIENT)
Dept: ANESTHESIOLOGY | Facility: CLINIC | Age: 55
End: 2025-01-30
Payer: COMMERCIAL

## 2025-01-30 DIAGNOSIS — M96.1 FAILED BACK SURGICAL SYNDROME: ICD-10-CM

## 2025-01-30 RX ORDER — HYDROMORPHONE HYDROCHLORIDE 2 MG/1
2 TABLET ORAL 2 TIMES DAILY
Qty: 15 TABLET | Refills: 0 | Status: SHIPPED | OUTPATIENT
Start: 2025-01-30

## 2025-01-30 NOTE — TELEPHONE ENCOUNTER
Refill request    Message from the patient:  Patient Comment: The pain is at the point where I can t manage it with what I have anymore. Its preventing me from sleeping much at all. The bad moments are up past a 9 and id rather not try to go to an ED up here in [place]. I think the closest pharmacies are [place] or [facility].    Preferred pharmacy: Other - [facility] in [facility]      Medication: HYDROmorphone (DILAUDID) 2 MG tablet     Sig: Take 1 tablet (2 mg) by mouth 2 times daily.     Dispensed: 15  Refills: 0  SOLD to the pt on: 11/22/24       Last clinic appointment: 1/28/25  Next clinic appointment: none listed    Last Drug Screen Collected: 5/25/23  Controlled Substance Agreement signed: 5/25/23      Preferred pharmacy:    [facility] [place] - [address]    Refill request routed to the provider to review.

## 2025-01-30 NOTE — TELEPHONE ENCOUNTER
PRIOR AUTHORIZATION DENIED    Medication: Semaglutide-Weight Management (WEGOVY) 2.4 MG/0.75ML pen - DENIED    Denial Date: 1/30/2025    Denial Rational: INSURANCE STATES MEDICATION IS EXCLUDED FROM COVERAGE.          Appeal Information:  IF YOU WOULD LIKE TO APPEAL PLEASE SUPPLY PA TEAM WITH A LETTER OF MEDICAL NECESSITY WITH CLINICAL REASON.

## 2025-01-30 NOTE — TELEPHONE ENCOUNTER
Retail Pharmacy Prior Authorization Team   Phone: [phone]    PA Initiation    Medication: Semaglutide-Weight Management (WEGOVY) 2.4 MG/0.75ML pen   Insurance Company: [employer] - Phone [phone] Fax [phone]  Pharmacy Filling the Rx: [place], [place] - [address]  Filling Pharmacy Phone: [phone]  Filling Pharmacy Fax: [phone]  Start Date: 1/30/2025

## 2025-01-30 NOTE — TELEPHONE ENCOUNTER
SECONDARY INSURANCE -      Prior Authorization Approval    Authorization Effective Date: 12/30/2024  Authorization Expiration Date: 1/29/2026  Medication: Wegovy - APPROVED  Approved Dose/Quantity:    Reference #:     Insurance Company: [employer] [place] - Phone [phone] Fax [phone]  Expected CoPay:       CoPay Card Available:      Foundation Assistance Needed:    Which Pharmacy is filling the prescription (Not needed for infusion/clinic administered): [facility] [place], [place] - [address] [place] [address]  Pharmacy Notified:  YES  Patient Notified:  YES

## 2025-02-04 ENCOUNTER — MYC MEDICAL ADVICE (OUTPATIENT)
Dept: INTERNAL MEDICINE | Facility: CLINIC | Age: 55
End: 2025-02-04
Payer: COMMERCIAL

## 2025-02-10 DIAGNOSIS — M96.1 LUMBAR POST-LAMINECTOMY SYNDROME: ICD-10-CM

## 2025-02-10 DIAGNOSIS — M96.1 POSTLAMINECTOMY SYNDROME: ICD-10-CM

## 2025-02-10 RX ORDER — BUPRENORPHINE 10 UG/H
PATCH TRANSDERMAL WEEKLY
Qty: 4 PATCH | Refills: 1 | Status: SHIPPED | OUTPATIENT
Start: 2025-02-10

## 2025-02-10 RX ORDER — DULOXETIN HYDROCHLORIDE 60 MG/1
CAPSULE, DELAYED RELEASE ORAL
Qty: 90 CAPSULE | Refills: 1 | Status: SHIPPED | OUTPATIENT
Start: 2025-02-10

## 2025-02-10 RX ORDER — DULOXETIN HYDROCHLORIDE 30 MG/1
30 CAPSULE, DELAYED RELEASE ORAL AT BEDTIME
Qty: 90 CAPSULE | Refills: 1 | Status: SHIPPED | OUTPATIENT
Start: 2025-02-10

## 2025-02-10 NOTE — TELEPHONE ENCOUNTER
Refill request    Medication/Sig:   DULoxetine (CYMBALTA) 30 MG capsule- 1 CAPSULE BY MOUTH AT BEDTIME   DULoxetine (CYMBALTA) 60 MG capsule-TAKE 1 CAPSULE BY MOUTH IN THE MORNING     buprenorphine (BUTRANS) 10 MCG/HR WK patch-Place onto the skin once a week.     Dispensed/Refills:DULoxetine-30mg and 60mg-90/1    SOLD to the pt on: Butrans-1/17/25   Last prescribed to patient: DULoxetine-9/11/24     Last clinic appointment: 1/28/25  Next clinic appointment: none listed    Last Drug Screen Collected: 5/25/23  Controlled Substance Agreement signed: none seen      Preferred pharmacy:    [place], [place] - [address] [place] [address]    Refill request routed to the provider to review.

## 2025-02-11 RX ORDER — PHENTERMINE HYDROCHLORIDE 30 MG/1
30 CAPSULE ORAL EVERY MORNING
Qty: 30 CAPSULE | Refills: 1 | Status: SHIPPED | OUTPATIENT
Start: 2025-02-11

## 2025-02-11 NOTE — TELEPHONE ENCOUNTER
Last Written Prescription:  phentermine 30 MG capsule 30 capsule 0 1/10/2025     ----------------------  Last Visit Date: 1/31/25  Future Visit Date: none  ----------------------    [x]  Refill decision: Medication unable to be refilled by RN due to: Controlled medication    Fatuma Espino RN  [facility] Central Nursing/Red Flag Triage & Med Refill Team       Request from pharmacy:  Requested Prescriptions   Pending Prescriptions Disp Refills    phentermine 30 MG capsule [Pharmacy Med Name: PHENTERMINE HCL 30MG CAPS] 30 capsule 0     Sig: TAKE ONE CAPSULE BY MOUTH ONCE EVERY MORNING.       There is no refill protocol information for this order

## 2025-02-18 ENCOUNTER — TELEPHONE (OUTPATIENT)
Dept: INTERNAL MEDICINE | Facility: CLINIC | Age: 55
End: 2025-02-18
Payer: COMMERCIAL

## 2025-02-18 NOTE — TELEPHONE ENCOUNTER
Prior Authorization Retail Medication Request    Medication/Dose: ZEPBOUND 5MG/0.5ML SOAJ  Diagnosis and ICD code (if different than what is on RX):    New/renewal/insurance change PA/secondary ins. PA:  Previously Tried and Failed:    Rationale:      Insurance   Primary: [employer] [place]  Insurance ID: [id number]      Secondary (if applicable): UC[employer] PART D [place]   Insurance ID: [id number]     Pharmacy Information (if different than what is on RX)  Name: [facility]   Phone: [phone]    Fax:    Clinic Information  Preferred routing pool for dept communication:       Thank you,  Claudia Day, Pharm Tech  [facility]

## 2025-02-21 NOTE — TELEPHONE ENCOUNTER
Retail Pharmacy Prior Authorization Team   Phone: [phone]    PA Initiation    Medication: ZEPBOUND 5 MG/0.5ML SC SOAJ  Insurance Company: [employer] - Phone [phone] Fax [phone]  Pharmacy Filling the Rx: [facility] [place], [place] - [address]  Filling Pharmacy Phone: [phone]  Filling Pharmacy Fax:    Start Date: 2/21/2025      Note: Due to record-high volumes, our turn-around time is taking longer than usual . We are currently 4  business days behind in the pools.   We are working diligently to submit all requests in a timely manner and in the order they are received. Please only flag TRUE URGENT requests as high priority to the pool at this time.   If you have questions on status of PA's,  please send a note/message in the active PA encounter and send back to the [facility] PA pool [[id number]].    If you have questions about the turn-around time or about our process, please reach out to our supervisor Tiara Velázquez.   Thank you!   RPPA (Retail Pharmacy Prior Authorization) team

## 2025-02-24 DIAGNOSIS — Q79.60 EHLERS-DANLOS SYNDROME: ICD-10-CM

## 2025-02-24 DIAGNOSIS — M96.1 POSTLAMINECTOMY SYNDROME: ICD-10-CM

## 2025-02-24 DIAGNOSIS — M96.1 FAILED BACK SURGICAL SYNDROME: ICD-10-CM

## 2025-02-24 NOTE — TELEPHONE ENCOUNTER
Refill request    Medication/Sig:     cyclobenzaprine (FLEXERIL) 10 MG tablet-TAKE ONE TABLET BY MOUTH THREE TIMES A DAY AS NEEDED FOR MUSCLE SPASMS   methocarbamol (ROBAXIN) 500 MG tablet-TAKE ONE TABLET BY MOUTH FOUR TIMES A DAY     traMADol (ULTRAM) 50 MG tablet-TAKE ONE TABLET BY MOUTH EVERY 6 HOURS AS NEEDED FOR PAIN     Dispensed/Refills:   cyclobenzaprine-60/0  Methocarbamol-30/0  traMADol-65/0  SOLD to the pt on:   traMADol-1/22/25   Last prescribed to patient:   cyclobenzaprine and methocarbamol -1/20/25    Last clinic appointment: 1/28/25  Next clinic appointment: none listed    Preferred pharmacy:    [facility], [place] - [address] [place] [address]    Refill request routed to the provider to review.

## 2025-02-25 NOTE — TELEPHONE ENCOUNTER
Retail Pharmacy Prior Authorization Team   Phone: [phone]    Prior Authorization Approval    Medication: ZEPBOUND 5 MG/0.5ML SC SOAJ  Authorization Effective Date: 1/25/2025  Authorization Expiration Date: 2/24/2026  Insurance Company: [employer] - Phone [phone] Fax [phone]  Which Pharmacy is filling the prescription: [address]  Pharmacy Notified: YES  Patient Notified: **Instructed pharmacy to notify patient when script is ready to /ship.**

## 2025-02-26 RX ORDER — CYCLOBENZAPRINE HCL 10 MG
10 TABLET ORAL 3 TIMES DAILY PRN
Qty: 60 TABLET | Refills: 0 | Status: SHIPPED | OUTPATIENT
Start: 2025-02-26

## 2025-02-26 RX ORDER — TRAMADOL HYDROCHLORIDE 50 MG/1
50 TABLET ORAL EVERY 6 HOURS PRN
Qty: 50 TABLET | Refills: 0 | Status: SHIPPED | OUTPATIENT
Start: 2025-02-26

## 2025-02-26 RX ORDER — METHOCARBAMOL 500 MG/1
500 TABLET, FILM COATED ORAL 4 TIMES DAILY
Qty: 30 TABLET | Refills: 0 | Status: SHIPPED | OUTPATIENT
Start: 2025-02-26

## 2025-03-03 ENCOUNTER — MYC REFILL (OUTPATIENT)
Dept: ANESTHESIOLOGY | Facility: CLINIC | Age: 55
End: 2025-03-03
Payer: COMMERCIAL

## 2025-03-03 DIAGNOSIS — M96.1 FAILED BACK SURGICAL SYNDROME: ICD-10-CM

## 2025-03-04 ENCOUNTER — TELEPHONE (OUTPATIENT)
Dept: NEUROSURGERY | Facility: CLINIC | Age: 55
End: 2025-03-04

## 2025-03-04 ENCOUNTER — MYC MEDICAL ADVICE (OUTPATIENT)
Dept: ANESTHESIOLOGY | Facility: CLINIC | Age: 55
End: 2025-03-04

## 2025-03-04 ENCOUNTER — VIRTUAL VISIT (OUTPATIENT)
Dept: NEUROSURGERY | Facility: CLINIC | Age: 55
End: 2025-03-04
Payer: COMMERCIAL

## 2025-03-04 VITALS — WEIGHT: 148 LBS | HEIGHT: 67 IN | BODY MASS INDEX: 23.23 KG/M2

## 2025-03-04 DIAGNOSIS — M96.1 FAILED BACK SURGICAL SYNDROME: Primary | ICD-10-CM

## 2025-03-04 ASSESSMENT — PAIN SCALES - GENERAL: PAINLEVEL_OUTOF10: SEVERE PAIN (9)

## 2025-03-04 NOTE — TELEPHONE ENCOUNTER
Refill request    Message from the patient: Im just not able to get out of this pain flare on my own. Im meditating through as much as i can but then i cant get much done. Its making me sick to my stomack. Mostly intense low back bone pain and baseof skull down neck. I have to be at the [facility] tomorrow so asked that it be filled there.    Medication: HYDROmorphone (DILAUDID) 2 MG tablet     Sig: Take 1 tablet (2 mg) by mouth 2 times daily    Dispensed: 15  Refills: 0  SOLD to the pt on: check      Last clinic appointment: 1/28/25  Next clinic appointment: none listed    Last Drug Screen Collected: 5/25/23  Controlled Substance Agreement signed: none found    Preferred pharmacy:    [place], [place] - [address] [place] [address] [id number]    Refill request routed to the provider to review.

## 2025-03-04 NOTE — TELEPHONE ENCOUNTER
Writer returned call to patient, OK per RN to change to video visit. Patient aware.     Dang Stevenson LPN  Neurosurgery

## 2025-03-04 NOTE — TELEPHONE ENCOUNTER
[facility] Call Center    Phone Message    May a detailed message be left on voicemail: yes     Reason for Call: Other: Ada called in today and stated that her pain level is at either a 8 or 9 and does not believe she can drive over an hour in this kind of pain. She states the pain is literally making her sick and she is unable to tolerate it at all. She is wanting to speak to someone on  team to see if she can do a video visit and or what she should do.      Action Taken: [facility] NEUROSURGERY     Travel Screening: Not Applicable     Date of Service:

## 2025-03-04 NOTE — NURSING NOTE
Current patient location: [address] [place] [id number]    Is the patient currently in the state of [place]? YES    Visit mode: VIDEO    If the visit is dropped, the patient can be reconnected by:VIDEO VISIT: Text to cell phone:   Telephone Information:   Mobile [phone]       Will anyone else be joining the visit? NO  (If patient encounters technical issues they should call [phone])    Are changes needed to the allergy or medication list? Pt stated no changes to allergies and Pt stated no med changes    Are refills needed on medications prescribed by this physician? NO    Rooming Documentation:  Not applicable    Reason for visit: SARAI Armstrong VVF

## 2025-03-04 NOTE — LETTER
3/4/2025       RE: Ada Welch  [address] [facility] [address]     Dear Colleague,    Thank you for referring your patient, Ada Welch, to the [facility] NEUROSURGERY CLINIC [place] at [facility]. Please see a copy of my visit note below.    Mri imaging are in pacs      [facility]       Virtual Visit Details    Type of service:  Video Visit   Video Start Time: 2:35 PM  Video End Time: 3:05    Originating Location (pt. Location): Home    Distant Location (provider location):  On-site  Platform used for Video Visit: [facility]    Neurosurgery Clinic Note    Reason for Visit: chronic pain    History of Present Illness  Ada Welch is a 53-year-old female with a history of Tom-Danlos syndrome (EDS), dysautonomia, chronic pain, and a history of spinal fusion, presenting for follow-up regarding chronic neck and back pain. She reports persistent, worsening pain affecting her cervical and lumbar spine that radiates upward into her head and downward to her mid-back and sacral region, with associated nausea and a deep, bone-aching pain. 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 Despite using meditation techniques, the pain remains severe and limits her ability to function.    She has previously trialed various conservative and interventional treatments, including medications, injections, and physical therapy, with limited long-term relief. Her primary concern today is the exhaustion from chronic pain and the associated fight-or-flight response, which she believes is compounding her fatigue and functional limitations. She is interested in pursuing spinal cord stimulation (SCS) as a potential treatment for her low back pain, despite skepticism from her long-time pain specialist, Dr. Martinez, who uncertain about what her response will be to  SCS.  However, she is determined to explore this option given her significant pain burden and lack of other viable alternatives due to her EDS-related contraindications for spinal fusion.    Additionally, she is curious about alternative treatment approaches, including prolotherapy for her cervical spine, though she understands this modality has variable efficacy. She also reports a recent and unexpected improvement in her muscle pain and mobility after starting apple cider vinegar supplements, speculating a potential link to lactic acid metabolism, though this remains unclear.    She has lost significant weight, currently at 148 lbs (down from 215 lbs) due to a combination of Herbalife and Wegovy, and she feels physically healthier overall aside from the pain. Her goal remains to regain mobility and independence, especially as she prepares for her daughter to enter high school.    Given her longstanding pain and refractory symptoms, we discussed proceeding with the SCS trial to determine its efficacy in modulating her pain before considering more invasive interventions.           Allergies   Allergen Reactions     Baclofen Other (See Comments)     Loss of muscle tone. Muscle weakness.     Bee Venom Shortness Of Breath, Palpitations, Anaphylaxis and Difficulty breathing     Patient does carry Epi-Pen     Chicken-Derived Products (Egg) Nausea and Diarrhea     Compazine [Prochlorperazine]      Extreme jittery     Food      Milk     Gabapentin      Dizzy     Gluten Meal GI Disturbance     Wheat bran and wheat     Pregabalin      Seasonal Allergies      Dust, mold       Current Outpatient Medications   Medication Sig Dispense Refill     acetaminophen (TYLENOL) 500 MG tablet Take 1,000 mg by mouth every 6 hours as needed for mild pain       albuterol (PROAIR HFA/PROVENTIL HFA/VENTOLIN HFA) 108 (90 Base) MCG/ACT inhaler Inhale 2 puffs into the lungs every 6 hours as needed for shortness of breath, wheezing or cough. 18 g  0     atorvastatin (LIPITOR) 20 MG tablet TAKE ONE TABLET BY MOUTH ONCE DAILY 90 tablet 3     benzonatate (TESSALON) 100 MG capsule Take 1 capsule (100 mg) by mouth 3 times daily as needed for cough. 30 capsule 0     benzonatate (TESSALON) 200 MG capsule Take 1 capsule (200 mg) by mouth 3 times daily as needed for cough. 45 capsule 0     budesonide-formoterol (SYMBICORT) 80-4.5 MCG/ACT Inhaler Inhale 2 puffs into the lungs 2 times daily. 10 g 0     buprenorphine (BUTRANS) 10 MCG/HR WK patch PLACE ONTO THE SKIN ONCE A WEEK. 4 patch 1     celecoxib (CELEBREX) 100 MG capsule Take 1 capsule (100 mg) by mouth as needed for moderate pain. 60 capsule 2     cetirizine (ZYRTEC) 10 MG tablet Take 10 mg by mouth daily as needed        co-enzyme Q-10 100 MG CAPS capsule Take 100 mg by mouth daily       cyclobenzaprine (FLEXERIL) 10 MG tablet TAKE ONE TABLET BY MOUTH THREE TIMES A DAY AS NEEDED FOR MUSCLE SPASMS 60 tablet 0     DULoxetine (CYMBALTA) 30 MG capsule Take 1 capsule (30 mg) by mouth at bedtime. TAKE ONE CAPSULE BY MOUTH EVERY NIGHT AT BEDTIME AND TAKE 60 MG CAPSULE IN THE MORNING 90 capsule 1     DULoxetine (CYMBALTA) 60 MG capsule TAKE ONE CAPSULE BY MOUTH EVERY MORNING AND TAKE 30 MG CAPSULE IN THE EVENING 90 capsule 1     EPINEPHrine (ANY BX GENERIC EQUIV) 0.3 MG/0.3ML injection 2-pack Inject 0.3 mLs (0.3 mg) into the muscle as needed for anaphylaxis May repeat one time in 5-15 minutes if response to initial dose is inadequate. 2 each 1     ferrous fumarate 65 mg, [place]. FE,-Vitamin C 125 mg (VITRON-C)  MG TABS tablet Take 1 tablet by mouth every other day 60 tablet 4     fluticasone (FLONASE) 50 MCG/ACT nasal spray SPRAY 2 SPRAYS INTO BOTH NOSTRILS DAILY 48 g 11     guanFACINE (TENEX) 1 MG tablet TAKE ONE TAB BY MOUTH ONCE NIGHTLY AT BEDTIME       HYDROmorphone (DILAUDID) 2 MG tablet Take 1 tablet (2 mg) by mouth 2 times daily. 15 tablet 0     hydrOXYzine HCl (ATARAX) 25 MG tablet Take 1 tablet (25 mg) by  mouth every 6 hours as needed for anxiety. 180 tablet 1     ibuprofen ([employer] IBUPROFEN) 200 MG capsule Take 600 mg by mouth every 4 hours as needed for fever       ketorolac (TORADOL) 10 MG tablet Take 1 tablet (10 mg) by mouth every 6 hours as needed for moderate pain. 6 tablet 0     Lidocaine (LIDOCARE) 4 % Patch Place 1-3 patches onto the skin every 24 hours To prevent lidocaine toxicity, patient should be patch free for 12 hrs daily. 20 patch 0     medical cannabis (Patient's own supply.  Not a prescription) Take 1 Dose by mouth See Admin Instructions Capsule formulation - uses as needed       methocarbamol (ROBAXIN) 500 MG tablet TAKE ONE TABLET BY MOUTH FOUR TIMES A DAY 30 tablet 0     metoclopramide (REGLAN) 10 MG tablet Take 1 tablet (10 mg) by mouth 4 times daily as needed (headache). 40 tablet 3     metoclopramide (REGLAN) 5 MG tablet Take 1 tablet (5 mg) by mouth 3 times daily as needed (migraine). 12 tablet 0     Multiple Vitamins-Minerals (MULTIVITAMIN PO) Take 1 tablet by mouth daily        naloxone (NARCAN) 4 MG/0.1ML nasal spray Spray 1 spray (4 mg) into one nostril alternating nostrils once as needed for opioid reversal every 2-3 minutes until assistance arrives 0.2 mL 0     phentermine 30 MG capsule Take 1 capsule (30 mg) by mouth every morning. 30 capsule 1     Probiotic Product (PROBIOTIC DAILY PO) Take 6 packets by mouth every morning Five-Lac       propranolol ER (INDERAL LA) 120 MG 24 hr capsule Take 1 capsule (120 mg) by mouth daily. 90 capsule 3     ramelteon (ROZEREM) 8 MG tablet Take 1 tablet (8 mg) by mouth at bedtime. 30 tablet 5     rizatriptan (MAXALT-MLT) 5 MG ODT TAKE 1-2 TABLETS (5-10 MG) BY MOUTH AT ONSET OF HEADACHE FOR MIGRAINE (MAX 30 MG IN 24 HOURS) 18 tablet 11     Semaglutide-Weight Management (WEGOVY) 1.7 MG/0.75ML pen Inject 1.7 mg subcutaneously once a week. 3 mL 0     Semaglutide-Weight Management (WEGOVY) 2.4 MG/0.75ML pen Inject 2.4 mg subcutaneously once a week. 3 mL 0      SUMAtriptan (IMITREX STATDOSE) 6 MG/0.5ML pen injector kit Inject 0.5 ml (6 mg) subcutaneously at onset of migraine, May repeat in 1 hour , Max 12 mg/24 hrs 2 kit 11     tirzepatide-Weight Management (ZEPBOUND) 5 MG/0.5ML prefilled pen Inject 0.5 mLs (5 mg) subcutaneously every 7 days. 2 mL 0     topiramate (TOPAMAX) 100 MG tablet Take 1 tablet (100 mg) by mouth daily 90 tablet 3     traMADol (ULTRAM) 50 MG tablet TAKE ONE TABLET BY MOUTH EVERY 6 HOURS AS NEEDED FOR PAIN 50 tablet 0     ubrogepant (UBRELVY) 100 MG tablet Take 1 tablet (100 mg) by mouth at onset of headache (headache). 10 tablet 11     vitamin B complex with vitamin C (STRESS TAB) tablet Take 1 tablet by mouth daily       VITAMIN D, CHOLECALCIFEROL, PO Take 2,000 Units by mouth daily       Current Facility-Administered Medications   Medication Dose Route Frequency Provider Last Rate Last Admin     Botulinum Toxin Type A (BOTOX) 200 units injection 155 Units  155 Units Intramuscular See Admin Instructions Noemy Nguyen MD   155 Units at 12/31/24 1000     Botulinum Toxin Type A (BOTOX) 200 units injection 155 Units  155 Units Intramuscular Q12 weeks Kristin Murphy MD         Botulinum Toxin Type A (BOTOX) 200 units injection 155 Units  155 Units Intramuscular See Admin Instructions Noemy Nguyen MD   155 Units at 03/28/23 1442       Assessment and Plan   Ada Welch is a 53-year-old female with a complex history of Tom-Danlos syndrome (EDS), dysautonomia, a history of spinal fusion, and chronic pain, presenting for reassessment of her persistent neck and back pain. Her pain remains severe and disabling, with burning, radiating discomfort in the cervical and lumbar spine, exacerbated by upright positioning and movement. She also experiences nausea, fatigue, and a persistent fight-or-flight response to pain, further contributing to her functional impairment.    Despite multiple prior conservative and  interventional treatments, including medications, epidural injections, and structured physical therapy, she has had limited durable relief. She is not a candidate for spinal fusion due to her EDS-related connective tissue fragility and remains highly motivated to explore alternative pain management options. A primary consideration is spinal cord stimulation (SCS) for her lumbar pain. She remains willing to proceed with an SCS trial, understanding the potential risks and limitations.    SCS trial with Dr. Juan Huber MD  Department of Neurosurgery  [facility]      Again, thank you for allowing me to participate in the care of your patient.      Sincerely,    Kirk Huber MD

## 2025-03-04 NOTE — PROGRESS NOTES
Virtual Visit Details    Type of service:  Video Visit   Video Start Time: 2:35 PM  Video End Time: 3:05    Originating Location (pt. Location): Home    Distant Location (provider location):  On-site  Platform used for Video Visit: [facility]    Neurosurgery Clinic Note    Reason for Visit: chronic pain    History of Present Illness  Ada Welch is a 53-year-old female with a history of Tom-Danlos syndrome (EDS), dysautonomia, chronic pain, and a history of spinal fusion, presenting for follow-up regarding chronic neck and back pain. She reports persistent, worsening pain affecting her cervical and lumbar spine that radiates upward into her head and downward to her mid-back and sacral region, with associated nausea and a deep, bone-aching pain. 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 Despite using meditation techniques, the pain remains severe and limits her ability to function.    She has previously trialed various conservative and interventional treatments, including medications, injections, and physical therapy, with limited long-term relief. Her primary concern today is the exhaustion from chronic pain and the associated fight-or-flight response, which she believes is compounding her fatigue and functional limitations. She is interested in pursuing spinal cord stimulation (SCS) as a potential treatment for her low back pain, despite skepticism from her long-time pain specialist, Dr. Martinez, who uncertain about what her response will be to SCS.  However, she is determined to explore this option given her significant pain burden and lack of other viable alternatives due to her EDS-related contraindications for spinal fusion.    Additionally, she is curious about alternative treatment approaches, including prolotherapy for her cervical spine, though she understands this modality has variable efficacy. She also  reports a recent and unexpected improvement in her muscle pain and mobility after starting apple cider vinegar supplements, speculating a potential link to lactic acid metabolism, though this remains unclear.    She has lost significant weight, currently at 148 lbs (down from 215 lbs) due to a combination of Herbalife and Wegovy, and she feels physically healthier overall aside from the pain. Her goal remains to regain mobility and independence, especially as she prepares for her daughter to enter high school.    Given her longstanding pain and refractory symptoms, we discussed proceeding with the SCS trial to determine its efficacy in modulating her pain before considering more invasive interventions.           Allergies   Allergen Reactions    Baclofen Other (See Comments)     Loss of muscle tone. Muscle weakness.    Bee Venom Shortness Of Breath, Palpitations, Anaphylaxis and Difficulty breathing     Patient does carry Epi-Pen    Chicken-Derived Products (Egg) Nausea and Diarrhea    Compazine [Prochlorperazine]      Extreme jittery    Food      Milk    Gabapentin      Dizzy    Gluten Meal GI Disturbance     Wheat bran and wheat    Pregabalin     Seasonal Allergies      Dust, mold       Current Outpatient Medications   Medication Sig Dispense Refill    acetaminophen (TYLENOL) 500 MG tablet Take 1,000 mg by mouth every 6 hours as needed for mild pain      albuterol (PROAIR HFA/PROVENTIL HFA/VENTOLIN HFA) 108 (90 Base) MCG/ACT inhaler Inhale 2 puffs into the lungs every 6 hours as needed for shortness of breath, wheezing or cough. 18 g 0    atorvastatin (LIPITOR) 20 MG tablet TAKE ONE TABLET BY MOUTH ONCE DAILY 90 tablet 3    benzonatate (TESSALON) 100 MG capsule Take 1 capsule (100 mg) by mouth 3 times daily as needed for cough. 30 capsule 0    benzonatate (TESSALON) 200 MG capsule Take 1 capsule (200 mg) by mouth 3 times daily as needed for cough. 45 capsule 0    budesonide-formoterol (SYMBICORT) 80-4.5 MCG/ACT  Inhaler Inhale 2 puffs into the lungs 2 times daily. 10 g 0    buprenorphine (BUTRANS) 10 MCG/HR WK patch PLACE ONTO THE SKIN ONCE A WEEK. 4 patch 1    celecoxib (CELEBREX) 100 MG capsule Take 1 capsule (100 mg) by mouth as needed for moderate pain. 60 capsule 2    cetirizine (ZYRTEC) 10 MG tablet Take 10 mg by mouth daily as needed       co-enzyme Q-10 100 MG CAPS capsule Take 100 mg by mouth daily      cyclobenzaprine (FLEXERIL) 10 MG tablet TAKE ONE TABLET BY MOUTH THREE TIMES A DAY AS NEEDED FOR MUSCLE SPASMS 60 tablet 0    DULoxetine (CYMBALTA) 30 MG capsule Take 1 capsule (30 mg) by mouth at bedtime. TAKE ONE CAPSULE BY MOUTH EVERY NIGHT AT BEDTIME AND TAKE 60 MG CAPSULE IN THE MORNING 90 capsule 1    DULoxetine (CYMBALTA) 60 MG capsule TAKE ONE CAPSULE BY MOUTH EVERY MORNING AND TAKE 30 MG CAPSULE IN THE EVENING 90 capsule 1    EPINEPHrine (ANY BX GENERIC EQUIV) 0.3 MG/0.3ML injection 2-pack Inject 0.3 mLs (0.3 mg) into the muscle as needed for anaphylaxis May repeat one time in 5-15 minutes if response to initial dose is inadequate. 2 each 1    ferrous fumarate 65 mg, [place]. FE,-Vitamin C 125 mg (VITRON-C)  MG TABS tablet Take 1 tablet by mouth every other day 60 tablet 4    fluticasone (FLONASE) 50 MCG/ACT nasal spray SPRAY 2 SPRAYS INTO BOTH NOSTRILS DAILY 48 g 11    guanFACINE (TENEX) 1 MG tablet TAKE ONE TAB BY MOUTH ONCE NIGHTLY AT BEDTIME      HYDROmorphone (DILAUDID) 2 MG tablet Take 1 tablet (2 mg) by mouth 2 times daily. 15 tablet 0    hydrOXYzine HCl (ATARAX) 25 MG tablet Take 1 tablet (25 mg) by mouth every 6 hours as needed for anxiety. 180 tablet 1    ibuprofen ([employer]S IBUPROFEN) 200 MG capsule Take 600 mg by mouth every 4 hours as needed for fever      ketorolac (TORADOL) 10 MG tablet Take 1 tablet (10 mg) by mouth every 6 hours as needed for moderate pain. 6 tablet 0    Lidocaine (LIDOCARE) 4 % Patch Place 1-3 patches onto the skin every 24 hours To prevent lidocaine toxicity, patient should  be patch free for 12 hrs daily. 20 patch 0    medical cannabis (Patient's own supply.  Not a prescription) Take 1 Dose by mouth See Admin Instructions Capsule formulation - uses as needed      methocarbamol (ROBAXIN) 500 MG tablet TAKE ONE TABLET BY MOUTH FOUR TIMES A DAY 30 tablet 0    metoclopramide (REGLAN) 10 MG tablet Take 1 tablet (10 mg) by mouth 4 times daily as needed (headache). 40 tablet 3    metoclopramide (REGLAN) 5 MG tablet Take 1 tablet (5 mg) by mouth 3 times daily as needed (migraine). 12 tablet 0    Multiple Vitamins-Minerals (MULTIVITAMIN PO) Take 1 tablet by mouth daily       naloxone (NARCAN) 4 MG/0.1ML nasal spray Spray 1 spray (4 mg) into one nostril alternating nostrils once as needed for opioid reversal every 2-3 minutes until assistance arrives 0.2 mL 0    phentermine 30 MG capsule Take 1 capsule (30 mg) by mouth every morning. 30 capsule 1    Probiotic Product (PROBIOTIC DAILY PO) Take 6 packets by mouth every morning Five-Lac      propranolol ER (INDERAL LA) 120 MG 24 hr capsule Take 1 capsule (120 mg) by mouth daily. 90 capsule 3    ramelteon (ROZEREM) 8 MG tablet Take 1 tablet (8 mg) by mouth at bedtime. 30 tablet 5    rizatriptan (MAXALT-MLT) 5 MG ODT TAKE 1-2 TABLETS (5-10 MG) BY MOUTH AT ONSET OF HEADACHE FOR MIGRAINE (MAX 30 MG IN 24 HOURS) 18 tablet 11    Semaglutide-Weight Management (WEGOVY) 1.7 MG/0.75ML pen Inject 1.7 mg subcutaneously once a week. 3 mL 0    Semaglutide-Weight Management (WEGOVY) 2.4 MG/0.75ML pen Inject 2.4 mg subcutaneously once a week. 3 mL 0    SUMAtriptan (IMITREX STATDOSE) 6 MG/0.5ML pen injector kit Inject 0.5 ml (6 mg) subcutaneously at onset of migraine, May repeat in 1 hour , Max 12 mg/24 hrs 2 kit 11    tirzepatide-Weight Management (ZEPBOUND) 5 MG/0.5ML prefilled pen Inject 0.5 mLs (5 mg) subcutaneously every 7 days. 2 mL 0    topiramate (TOPAMAX) 100 MG tablet Take 1 tablet (100 mg) by mouth daily 90 tablet 3    traMADol (ULTRAM) 50 MG tablet TAKE  ONE TABLET BY MOUTH EVERY 6 HOURS AS NEEDED FOR PAIN 50 tablet 0    ubrogepant (UBRELVY) 100 MG tablet Take 1 tablet (100 mg) by mouth at onset of headache (headache). 10 tablet 11    vitamin B complex with vitamin C (STRESS TAB) tablet Take 1 tablet by mouth daily      VITAMIN D, CHOLECALCIFEROL, PO Take 2,000 Units by mouth daily       Current Facility-Administered Medications   Medication Dose Route Frequency Provider Last Rate Last Admin    Botulinum Toxin Type A (BOTOX) 200 units injection 155 Units  155 Units Intramuscular See Admin Instructions Noemy Nguyen MD   155 Units at 12/31/24 1000    Botulinum Toxin Type A (BOTOX) 200 units injection 155 Units  155 Units Intramuscular Q12 weeks Kristin Murphy MD        Botulinum Toxin Type A (BOTOX) 200 units injection 155 Units  155 Units Intramuscular See Admin Instructions Noemy Nguyen MD   155 Units at 03/28/23 1442       Assessment and Plan   Ada Welch is a 53-year-old female with a complex history of Tom-Danlos syndrome (EDS), dysautonomia, a history of spinal fusion, and chronic pain, presenting for reassessment of her persistent neck and back pain. Her pain remains severe and disabling, with burning, radiating discomfort in the cervical and lumbar spine, exacerbated by upright positioning and movement. She also experiences nausea, fatigue, and a persistent fight-or-flight response to pain, further contributing to her functional impairment.    Despite multiple prior conservative and interventional treatments, including medications, epidural injections, and structured physical therapy, she has had limited durable relief. She is not a candidate for spinal fusion due to her EDS-related connective tissue fragility and remains highly motivated to explore alternative pain management options. A primary consideration is spinal cord stimulation (SCS) for her lumbar pain. She remains willing to proceed with an SCS trial,  understanding the potential risks and limitations.    SCS trial with Dr. Juan Huber MD  Department of Neurosurgery  [facility]

## 2025-03-05 ENCOUNTER — TELEPHONE (OUTPATIENT)
Dept: ANESTHESIOLOGY | Facility: CLINIC | Age: 55
End: 2025-03-05
Payer: COMMERCIAL

## 2025-03-05 DIAGNOSIS — M54.12 CERVICAL RADICULOPATHY: Primary | ICD-10-CM

## 2025-03-05 RX ORDER — HYDROMORPHONE HYDROCHLORIDE 2 MG/1
2 TABLET ORAL 2 TIMES DAILY
Qty: 10 TABLET | Refills: 0 | Status: SHIPPED | OUTPATIENT
Start: 2025-03-05

## 2025-03-05 RX ORDER — DIAZEPAM 5 MG/1
5-10 TABLET ORAL
OUTPATIENT
Start: 2025-03-05

## 2025-03-05 NOTE — TELEPHONE ENCOUNTER
Called patient to schedule procedure with Dr. Martinez    Date of Procedure: 3/19/25    Arrival time given: Yes: Arrival Time 12pm       Procedure Location: [facility]     Verified Location with Patient:  Yes  Address provided to the patient     Pre-op H&P Required:  No: Local anesthesia        Post-Op/Follow Up Appt:  Not Indicated in Request      Informed patient they will need a  to drive them home:  Yes    Patients : Spouse     Patient is aware that pre-op RN from the procedure center will call 2-3 days prior to scheduled procedure to confirm arrival time and review any instructions:  Yes       Additional Comments: N/A        Kerry Meyer MA on 3/5/2025 at 3:49 PM      P: [phone]*

## 2025-03-12 ENCOUNTER — MYC MEDICAL ADVICE (OUTPATIENT)
Dept: INTERNAL MEDICINE | Facility: CLINIC | Age: 55
End: 2025-03-12
Payer: COMMERCIAL

## 2025-03-13 DIAGNOSIS — M96.1 POSTLAMINECTOMY SYNDROME: ICD-10-CM

## 2025-03-13 RX ORDER — TRAMADOL HYDROCHLORIDE 50 MG/1
50 TABLET ORAL 2 TIMES DAILY PRN
Qty: 50 TABLET | Refills: 0 | Status: SHIPPED | OUTPATIENT
Start: 2025-03-25

## 2025-03-13 NOTE — TELEPHONE ENCOUNTER
Refill request    Medication: traMADol (ULTRAM) 50 MG tablet     Sig: TAKE ONE TABLET BY MOUTH EVERY 6 HOURS AS NEEDED FOR PAIN - Oral     Dispensed: 50 tablet  Refills: 0  SOLD to the pt on: 02/26/2025     Last clinic appointment: 01/28/2025  Next clinic appointment: N/A    Last Drug Screen Collected: 05/25/2023  Controlled Substance Agreement signed: 08/19/2024    Preferred pharmacy:  [facility], [place] - [address] [place] [address]     Refill request routed to the provider to review.

## 2025-03-14 NOTE — CONFIDENTIAL NOTE
Can we confirm patient's dose of her medication and her most recent weight?  Thanks,  Ellyn Rivera MD  Internal Medicine

## 2025-03-18 NOTE — TELEPHONE ENCOUNTER
Left Voicemail (1st Attempt) and Sent Mychart (1st Attempt) for the patient to call back and schedule the following:    Appointment type: [facility] RETURN or Video Return (whatever is first available)   Provider: PCP  Return date: First Available  Specialty phone number: [phone]  Additional appointment(s) needed: -  Additonal Notes: GLP1 med management

## 2025-03-19 ENCOUNTER — VIRTUAL VISIT (OUTPATIENT)
Dept: INTERNAL MEDICINE | Facility: CLINIC | Age: 55
End: 2025-03-19
Payer: COMMERCIAL

## 2025-03-19 DIAGNOSIS — R53.81 MALAISE: Primary | ICD-10-CM

## 2025-03-19 DIAGNOSIS — J06.9 UPPER RESPIRATORY TRACT INFECTION, UNSPECIFIED TYPE: ICD-10-CM

## 2025-03-19 PROCEDURE — 98006 SYNCH AUDIO-VIDEO EST MOD 30: CPT | Performed by: INTERNAL MEDICINE

## 2025-03-19 PROCEDURE — G2211 COMPLEX E/M VISIT ADD ON: HCPCS | Performed by: INTERNAL MEDICINE

## 2025-03-19 PROCEDURE — 1125F AMNT PAIN NOTED PAIN PRSNT: CPT | Performed by: INTERNAL MEDICINE

## 2025-03-19 RX ORDER — AZITHROMYCIN 250 MG/1
TABLET, FILM COATED ORAL
Qty: 6 TABLET | Refills: 0 | Status: SHIPPED | OUTPATIENT
Start: 2025-03-19 | End: 2025-03-24

## 2025-03-19 RX ORDER — PREDNISONE 20 MG/1
TABLET ORAL
Qty: 9 TABLET | Refills: 0 | Status: SHIPPED | OUTPATIENT
Start: 2025-03-19 | End: 2025-03-25

## 2025-03-19 NOTE — PROGRESS NOTES
Ada is a 54 year old who is being evaluated via a billable video visit.    How would you like to obtain your AVS? MyChart  If the video visit is dropped, the invitation should be resent by: Text to cell phone: [phone]  Will anyone else be joining your video visit? No      Assessment & Plan     BMI 31.0-31.9,adult  Discussed rapid significant weight loss, current BMI 23. Advised titrating down on dose, especially given malaise and anxiety which may or may not be related. Would consider further reduction or even a break if symptoms don't improve. She prefers to stay on topiramate and phentermine given other benefits.  - Semaglutide-Weight Management (WEGOVY) 1 MG/0.5ML pen; Inject 1 mg subcutaneously once a week.    Malaise  Advised to contact clinic if not improving or worsening.  - azithromycin (ZITHROMAX) 250 MG tablet; Take 2 tablets (500 mg) by mouth daily for 1 day, THEN 1 tablet (250 mg) daily for 4 days.  - predniSONE (DELTASONE) 20 MG tablet; Take 2 tablets (40 mg) by mouth daily for 3 days, THEN 1 tablet (20 mg) daily for 3 days.  - COVID-19 Virus (Coronavirus) by PCR; Future    Upper respiratory tract infection, unspecified type  - azithromycin (ZITHROMAX) 250 MG tablet; Take 2 tablets (500 mg) by mouth daily for 1 day, THEN 1 tablet (250 mg) daily for 4 days.  - predniSONE (DELTASONE) 20 MG tablet; Take 2 tablets (40 mg) by mouth daily for 3 days, THEN 1 tablet (20 mg) daily for 3 days.  - COVID-19 Virus (Coronavirus) by PCR; Future      The longitudinal plan of care for the diagnosis(es)/condition(s) as documented were addressed during this visit. Due to the added complexity in care, I will continue to support Ada in the subsequent management and with ongoing continuity of care.              No follow-ups on file.    Subjective   Ada is a 54 year old, presenting for the following health issues:  RECHECK and Cough      Video Start Time: 8:01 AM    History of Present Illness       Reason  "for visit:  Wegovy weight loss mood changes illness    She eats 2-3 servings of fruits and vegetables daily.She consumes 0 sweetened beverage(s) daily.She exercises with enough effort to increase her heart rate 10 to 19 minutes per day.  She exercises with enough effort to increase her heart rate 3 or less days per week.   She is taking medications regularly.     Not doing well, having mood swings, feeling weak  Coughing up green phlegm, sweats, no fevers, throat gets tight, no dyspnea, no wheezing, no sinus pain  Feels similar to last fall  Feeling sick for a couple weeks  She has lost tremendous weight on Wegovy, weight is around 148 lbs  She takes topiramate, helps her sleep  Worried about going off phentermine for mood  She states she is eating protein shakes and fresh foods, eats 2-3 meals per day  Okay with going down on wegovy  She is still on duloxetine but needs to find a new therapist, anxiety is worse lately      Estimated body mass index is 23.53 kg/m  as calculated from the following:    Height as of 3/4/25: 1.689 m (5' 6.5\").    Weight as of 3/4/25: 67.1 kg (148 lb).                 Objective    Vitals - Patient Reported  Pain Score: Moderate Pain (6)  Pain Loc: Low Back  Are refills needed on medications prescribed by this physician? YES     Vitals:  No vitals were obtained today due to virtual visit.    Physical Exam   GENERAL: alert and no distress, fatigued appearing, mildly anxious appearing  EYES: Eyes grossly normal to inspection.  No discharge or erythema, or obvious scleral/conjunctival abnormalities.  RESP: No audible wheeze, cough, or visible cyanosis.    SKIN: Visible skin clear. No significant rash, abnormal pigmentation or lesions.  NEURO: Cranial nerves grossly intact.  Mentation and speech appropriate for age.  PSYCH: Appropriate affect, tone, and pace of words          Video-Visit Details    Type of service:  Video Visit   Video End Time:8:22 AM  Originating Location (pt. Location): " Home    Distant Location (provider location):  Off-site  Platform used for Video Visit: Sobeida  Signed Electronically by: Ellyn Rivera MD

## 2025-03-19 NOTE — PATIENT INSTRUCTIONS
Thank you for visiting the Primary Care Center today at the [facility]! The following is some information about our clinic:     Primary Care Center Frequently-Asked Questions    (1) How do I schedule appointments at the [facility]?     Primary Care--to schedule or make changes to an existing appointment, please call our primary care line at [phone].    Labs--to schedule a lab appointment at the [facility] you can use [employer] or call [phone]. If you have a [place] location that is closer to home, you can reach out to that location for scheduling options.     Imaging--if you need to schedule a CT, X-ray, MRI, ultrasound, or other imaging study you can call [phone] to schedule at the [facility] or any other [facility] imaging location.     Referrals--if a referral to another specialty was ordered you can expect a phone call from their scheduling team. If you have not heard from them in a week, please call us or send us a [employer] message to check the status or get a scheduling number. Please note that this only applies to internal [facility] referrals. If the referral is external you would need to contact their office for scheduling.     (2) I have a question about my visit, who do I contact?     You can call us at the primary care line at [phone] to ask questions about your visit. You can also send a secure message through [employer], which is reviewed by clinic staff. Please note that [employer] messages have a twenty-four to forty-eight business hour turnaround time and should not be used for urgent concerns.    (3) How will I get the results of my tests?    If you are signed up for [employer] all tests will be released to you within twenty-four hours of resulting. Please allow three to five days for your doctor to review your results and place a note interpreting the results. If you do not have [employer] you will receive your  results through mail seven to ten business days following the return of the tests. Please note that if there should be any urgent or concerning results that your doctor or their registered nurse will reach out to you the same day as the tests come back. If you have follow up questions about your results or would like to discuss the results in detail please schedule a follow up with your provider either in person or virtually.     (4) How do I get refills of my prescriptions?     You should always first contact your pharmacy for refills of your medications. If submitting a refill request on [employer], please be sure to submit the request only once--repeat requests can cause delays in refill. If you are requesting a NEW medication or a medication related to new symptoms you will need to schedule an appointment with a provider prior to approval. Please note: Routine medication refills have up to one to three business day turnaround whereas controlled substances refills have up to five to seven business day turnaround.    (5) I have new symptoms, what do I do?     If you are having an immediate medical emergency, you should dial 911 for assistance.   For anything urgent that needs to be seen within a few hours to one day you should visit a local urgent care for assistance.  For non-urgent symptoms that need to be seen within a few days to a week you can schedule with an available provider in primary care by going to [employer] or calling [phone].   If you are not sure how serious your symptoms are or you would like to receive medical advice you can always call [phone] to speak with a triage nurse.

## 2025-03-20 NOTE — TELEPHONE ENCOUNTER
" omeprazole (PRILOSEC) 40 MG DR capsule   Last Written Prescription Date: 1/16/20  Last Fill Quantity: 60,   # refills: 1  Last Office Visit : 8/8/18  Future Office visit:  4/30/20  New:  Inga    \" RTC: Return in about 3 months (around 11/8/2018)\"       Routing refill request to provider for review/approval because: lv 8/18  F/u 4/3/20  Had 2 GI ESOPHAGEAL procedures. rf?  "
Yes

## 2025-03-24 DIAGNOSIS — G47.00 INSOMNIA, UNSPECIFIED TYPE: ICD-10-CM

## 2025-03-25 ENCOUNTER — OFFICE VISIT (OUTPATIENT)
Dept: NEUROLOGY | Facility: CLINIC | Age: 55
End: 2025-03-25
Payer: COMMERCIAL

## 2025-03-25 DIAGNOSIS — G43.719 INTRACTABLE CHRONIC MIGRAINE WITHOUT AURA AND WITHOUT STATUS MIGRAINOSUS: Primary | ICD-10-CM

## 2025-03-25 NOTE — LETTER
3/25/2025       RE: Ada Welch  [address] [address]     Dear Colleague,    Thank you for referring your patient, Ada Welch, to the [facility] [place] at [facility]. Please see a copy of my visit note below.    Botulinum Toxin Procedure Note     Ada Welch  [id number]     Ordering provider: Noemy Nguyen MD     The procedure was explained to the patient. Benefits of the treatment were discussed including headache and migraine reduction. Risks of the procedure were reviewed including but not limited to pain, bruising, bleeding, infection, weakness of muscles injected or those distal to injection. The patient voiced understanding of the risks and benefits. All questions answered and the patient consented to proceed.      Prior to receiving Botox injections the patient reported the following:  Baseline headache/migraine frequency: more than 15 days a month  Baseline headache/migraine duration: more than 4 hours  Associated migrainous features: wavy vision distortion, nausea, photophobia     Previous treatment trials:  amitriptyline, zonisamide, gabapentin, Aimovig, Emgality, duloxetine, tizanidine, propranolol     Last Botox procedure: 12/31/24  Current migraine frequency: She is feeling much better in the last few weeks. She thinks the cycle of intractable headache helped.  Finally the cycle is breaking for the past 6 months with headaches resolved a few weeks ago. She suspects this flare was triggered by COVID infection.   Current migraine duration: 1 day     No evidence that the patient has medication overuse headache this has been ruled out. Stopped using triptans not helping. Daily using metoclopramide. Not using Ketorolac or Butalbital.     Using ibuprofen and Tylenol daily.      Functional improvements since starting botulinum toxin treatments: In addition she notes that she is able to be more  functional and interactive with her family.      Last Neurology office visit: 12/31/24     A 200 unit vial of Botox was diluted with 4ml of 0.9% sodium chloride     Botox lot # and expiration date: See MAR for details  0.9% sodium chloride lot # and expiration date: See MAR for details     The following muscles were injected using a 30 gauge needle:  Procerus 1 site 5 units   2 sites (bilat) 10 units  Frontalis 4 sites (bilat) 20 units ( slight eyebrow droop on right, Botox shift up and lateral)  Temporalis 8 sites (bilat) 40 units  **Trapezius muscles 4 sites (bilat) 10 units (lateral and reduced)  Occipitalis 6 sites (bilat) 50 units  **Splenius capitus 2 sites (bilat) 10 units (lateral and reduced)  Previously masseters were being injected but pt requested this be held this appointment.    A total of 145 units were injected, 55 units wasted.   The patient tolerated the injections well. I was present for and performed the entire procedure.      Noemy Nguyen MD      Again, thank you for allowing me to participate in the care of your patient.      Sincerely,    Noemy Nguyen MD

## 2025-03-25 NOTE — PROGRESS NOTES
Botulinum Toxin Procedure Note     Ada Welch  [id number]     Ordering provider: Noemy Nguyen MD     The procedure was explained to the patient. Benefits of the treatment were discussed including headache and migraine reduction. Risks of the procedure were reviewed including but not limited to pain, bruising, bleeding, infection, weakness of muscles injected or those distal to injection. The patient voiced understanding of the risks and benefits. All questions answered and the patient consented to proceed.      Prior to receiving Botox injections the patient reported the following:  Baseline headache/migraine frequency: more than 15 days a month  Baseline headache/migraine duration: more than 4 hours  Associated migrainous features: wavy vision distortion, nausea, photophobia     Previous treatment trials:  amitriptyline, zonisamide, gabapentin, Aimovig, Emgality, duloxetine, tizanidine, propranolol     Last Botox procedure: 12/31/24  Current migraine frequency: She is feeling much better in the last few weeks. She thinks the cycle of intractable headache helped.  Finally the cycle is breaking for the past 6 months with headaches resolved a few weeks ago. She suspects this flare was triggered by COVID infection.   Current migraine duration: 1 day     No evidence that the patient has medication overuse headache this has been ruled out. Stopped using triptans not helping. Daily using metoclopramide. Not using Ketorolac or Butalbital.     Using ibuprofen and Tylenol daily.      Functional improvements since starting botulinum toxin treatments: In addition she notes that she is able to be more functional and interactive with her family.      Last Neurology office visit: 12/31/24     A 200 unit vial of Botox was diluted with 4ml of 0.9% sodium chloride     Botox lot # and expiration date: See MAR for details  0.9% sodium chloride lot # and expiration date: See MAR for details     The following muscles were  injected using a 30 gauge needle:  Procerus 1 site 5 units   2 sites (bilat) 10 units  Frontalis 4 sites (bilat) 20 units ( slight eyebrow droop on right, Botox shift up and lateral)  Temporalis 8 sites (bilat) 40 units  **Trapezius muscles 4 sites (bilat) 10 units (lateral and reduced)  Occipitalis 6 sites (bilat) 50 units  **Splenius capitus 2 sites (bilat) 10 units (lateral and reduced)  Previously masseters were being injected but pt requested this be held this appointment.    A total of 145 units were injected, 55 units wasted.   The patient tolerated the injections well. I was present for and performed the entire procedure.      Noemy Nguyen MD

## 2025-03-28 NOTE — TELEPHONE ENCOUNTER
ramelteon (ROZEREM) 8 MG tablet   Start: 10/10/2024   Disp-30 tablet, R-5   Take 1 tablet (8 mg) by mouth at bedtime     Ellyn Rivera MD  Internal Medicine   3/19/25  Nv none    Refill decision: Medication unable to be refilled by RN due to: Medication not included in refill protocol policy     Request from pharmacy:  Requested Prescriptions   Pending Prescriptions Disp Refills    ramelteon (ROZEREM) 8 MG tablet [Pharmacy Med Name: RAMELTEON 8MG TABS] 30 tablet 5     Sig: TAKE ONE TABLET BY MOUTH ONCE DAILY AT BEDTIME.       There is no refill protocol information for this order

## 2025-04-01 RX ORDER — RAMELTEON 8 MG/1
8 TABLET ORAL AT BEDTIME
Qty: 30 TABLET | Refills: 5 | Status: SHIPPED | OUTPATIENT
Start: 2025-04-01

## 2025-04-02 ENCOUNTER — MYC MEDICAL ADVICE (OUTPATIENT)
Dept: NEUROLOGY | Facility: CLINIC | Age: 55
End: 2025-04-02
Payer: COMMERCIAL

## 2025-04-02 ENCOUNTER — MYC MEDICAL ADVICE (OUTPATIENT)
Dept: INTERNAL MEDICINE | Facility: CLINIC | Age: 55
End: 2025-04-02
Payer: COMMERCIAL

## 2025-04-02 DIAGNOSIS — G43.719 INTRACTABLE CHRONIC MIGRAINE WITHOUT AURA AND WITHOUT STATUS MIGRAINOSUS: Primary | ICD-10-CM

## 2025-04-02 DIAGNOSIS — J01.01 ACUTE RECURRENT MAXILLARY SINUSITIS: Primary | ICD-10-CM

## 2025-04-03 ENCOUNTER — VIRTUAL VISIT (OUTPATIENT)
Dept: ENDOCRINOLOGY | Facility: CLINIC | Age: 55
End: 2025-04-03
Payer: COMMERCIAL

## 2025-04-03 DIAGNOSIS — R73.9 BLOOD SUGAR INCREASED: ICD-10-CM

## 2025-04-03 DIAGNOSIS — Z86.39 HISTORY OF THYROID DISEASE: Primary | ICD-10-CM

## 2025-04-03 DIAGNOSIS — L65.9 HAIR LOSS: ICD-10-CM

## 2025-04-03 DIAGNOSIS — R53.83 OTHER FATIGUE: ICD-10-CM

## 2025-04-03 DIAGNOSIS — E03.9 ACQUIRED HYPOTHYROIDISM: ICD-10-CM

## 2025-04-03 RX ORDER — METHYLPREDNISOLONE 4 MG/1
TABLET ORAL
Qty: 21 TABLET | Refills: 0 | Status: SHIPPED | OUTPATIENT
Start: 2025-04-03

## 2025-04-03 ASSESSMENT — PAIN SCALES - GENERAL: PAINLEVEL_OUTOF10: SEVERE PAIN (7)

## 2025-04-03 NOTE — NURSING NOTE
Current patient location: [address] [place] [id number]    Is the patient currently in the state of [place]? YES    Visit mode: VIDEO    If the visit is dropped, the patient can be reconnected by:VIDEO VISIT: Send to e-mail at: [email or web address]    Will anyone else be joining the visit? NO  (If patient encounters technical issues they should call [phone]    Are changes needed to the allergy or medication list? No    Are refills needed on medications prescribed by this physician? NO    Rooming Documentation:  Questionnaire(s) completed    Reason for visit: RECHECK    Caryn TREVINO

## 2025-04-03 NOTE — PROGRESS NOTES
Assessment/Plan :   IRS/Obesity. Ada is doing well. She continues to take 1 mg of Wegovy weekly and she feels like this is working well. She has continued to lose weight and she feels like her blood sugars have also stabilized. With her healthy sustained weight loss, I would like her to continue with the 1 mg dose. I will send in a new prescription today. We will follow-up in 6 mos.   Menopausal syndrome. Ada is not sure if she is menopausal or perimenopausal. She has not had a menstrual cycle in about 4 yrs. She didn't think that she had developed any signs of menopause until she recently listened to a podcast on the subject. She has been experiencing a lot of issues with fatigue, memory fog, and hair loss. She also has a history of vaginal dryness and her last Pap smear was very painful. We briefly discussed HRT. I would like to check hormone levels today. We may start a low dose of HRT but I would also like her to reach to a GYN to discuss this further.     Due to the COVID 19 pandemic this visit was a telephone/video visit in order to help prevent spread of infection in this patient and the general population. The patient gave verbal consent for the visit today. I have independently reviewed and interpreted labs, imaging as indicated.       Distant Location (provider location):  On-site  Mode of Communication:  Video Conference via [employer]  Chart review/prep time 5    Visit Start time 11:09:27  Visit Stop time 11:21:08  21 minutes spent on the date of the encounter doing chart review, history and exam, documentation and further activities as noted above.      Chief complaint:  Ada is a 54 year old female who returns for follow-up of obesity and IRS.    I have reviewed Care Everywhere including [facility], [facility], [facility], [facility], [place], [facility], [facility] , [facility], [place] lab reports, imaging reports and provider notes as indicated.      HISTORY OF PRESENT ILLNESS  Ada  "has noticed an improvement since getting back on Wegovy 1 mg weekly. Her weight is stable and she has noticed an improvement with her blood sugars. She is not experiencing as many low blood sugars. She has also been focused on cutting out bread, pasta, candy, and sugary treats. She feels like her metabolic syndrome as improved significant but she has also developed some other problems. She continues to struggle with fatigue, anxiousness and memory issues. She worries that she may be perimenopausal or menopausal. She has not had a menstrual cycle in about 4 yrs. She feels like the anxiousness and memory issues have continued to worsen over the last 4 yrs. She has also had a lot of hair loss. She doesn't have any eyebrows or pubic hair.    Ada has a complicated history that includes Tom-Danlos syndrome, history of hypothyroidism, obesity, POTS, HTN, lumbar and cervical radiculopathy, and migraines. She also has a history of gestational diabetes during 1 of her 5 pregnancies. In recent years, she has struggled with glucose fluctuations with significant hypoglycemia. She states that she can never go more than a few hours without food. Over time, this constant need to eat or snack, led to weight gain. In 2023, she was started on Wegovy to help with the weight and she finally felt \"normal.\" Her insurance did stop covering the medication in May of 2024. She had to discontinue it and ended up gaining back 50 lbs. She is back on the medication now and doing well. She also has a history of hypothyroidism.   Endocrine relevant labs are as follows:    Relevant imaging is as follows: (as read by me as it pertains to endocrine relevant organs)   Latest Reference Range & Units 05/24/24 10:27   Hemoglobin A1C 0.0 - 5.6 % 5.4      Latest Reference Range & Units 11/13/24 15:52   TSH 0.30 - 4.20 uIU/mL 1.65      Latest Reference Range & Units 12/15/23 11:19   Hemoglobin A1C <5.7 % 5.3      Latest Reference Range & Units " 12/15/23 11:19   TSH 0.30 - 4.20 uIU/mL 1.90     REVIEW OF SYSTEMS    Endocrine: positive for obesity, IRS, and menopausal symptoms  Skin: positive for hair changes  Eyes: negative for, redness, tearing  Ears/Nose/Throat: negative  Respiratory: No shortness of breath, dyspnea on exertion, cough, or hemoptysis  Cardiovascular: negative for, chest pain, dyspnea on exertion, lower extremity edema, and exercise intolerance  Gastrointestinal: negative for, nausea, vomiting, constipation, and diarrhea  Genitourinary: positive for amenorrhea and vaginal dryness, negative for, nocturia, dysuria, frequency, and urgency  Musculoskeletal: negative for, muscular weakness, nocturnal cramping, and foot pain  Neurologic: positive for memory problems, negative for, numbness or tingling of hands, and numbness or tingling of feet  Psychiatric: positive for excessive stress, sleep disturbance, and feeling anxious  Hematologic/Lymphatic/Immunologic: negative      Past Medical History  Past Medical History:   Diagnosis Date    Allergic rhinitis 09/2007    Anxiety     Arthritis 11/01/2013    Found during search for cause of back pain    Autoimmune disease 2016    Immunosuppresive    Autonomic dysfunction     Bleeding disorder 2016    Bruise easily    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    Cervicalgia     Chronic sinusitis 00/2007    Diagnosed with chronic pansinusitis 2016    Coagulation disorder     factor 8    CSF leak     Chiari malformation    Depression     Tom-Danlos disease     Eosinophilic esophagitis 08/2018    Gastroesophageal reflux disease 3/1/2017    I have large hiatal hernia    Hiatal hernia 2016    Have adeline hiatel hernia    Hoarseness Unsure    It comes and goes    Hypertension     Hypothyroid     IBS (irritable bowel syndrome) with constipation     improved on Linzess    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    Irregular heart beat     Migraines 1/1/2007    Unsure of  exact date    Nasal polyps 2013    Were revoved 03/2017, benign    Orthostatic hypotension     Other nervous system complications 1/2014    Autonomic nervous system disorder, neuralgia, neuropathy    Swelling, mass, or lump in head and neck 08/2016    Lymph node has been growing for last year    Thyroid disease 01/01/2008    Urinary incontinence     Urinary urgency        Medications  Current Outpatient Medications   Medication Sig Dispense Refill    Semaglutide-Weight Management (WEGOVY) 1 MG/0.5ML pen Inject 1 mg subcutaneously once a week. 2 mL 1    acetaminophen (TYLENOL) 500 MG tablet Take 1,000 mg by mouth every 6 hours as needed for mild pain      albuterol (PROAIR HFA/PROVENTIL HFA/VENTOLIN HFA) 108 (90 Base) MCG/ACT inhaler Inhale 2 puffs into the lungs every 6 hours as needed for shortness of breath, wheezing or cough. 18 g 0    atorvastatin (LIPITOR) 20 MG tablet TAKE ONE TABLET BY MOUTH ONCE DAILY 90 tablet 3    benzonatate (TESSALON) 100 MG capsule Take 1 capsule (100 mg) by mouth 3 times daily as needed for cough. 30 capsule 0    benzonatate (TESSALON) 200 MG capsule Take 1 capsule (200 mg) by mouth 3 times daily as needed for cough. 45 capsule 0    budesonide-formoterol (SYMBICORT) 80-4.5 MCG/ACT Inhaler Inhale 2 puffs into the lungs 2 times daily. 10 g 0    buprenorphine (BUTRANS) 10 MCG/HR WK patch PLACE ONTO THE SKIN ONCE A WEEK. 4 patch 1    celecoxib (CELEBREX) 100 MG capsule Take 1 capsule (100 mg) by mouth as needed for moderate pain. 60 capsule 2    cetirizine (ZYRTEC) 10 MG tablet Take 10 mg by mouth daily as needed       co-enzyme Q-10 100 MG CAPS capsule Take 100 mg by mouth daily      cyclobenzaprine (FLEXERIL) 10 MG tablet TAKE ONE TABLET BY MOUTH THREE TIMES A DAY AS NEEDED FOR MUSCLE SPASMS 60 tablet 0    DULoxetine (CYMBALTA) 30 MG capsule Take 1 capsule (30 mg) by mouth at bedtime. TAKE ONE CAPSULE BY MOUTH EVERY NIGHT AT BEDTIME AND TAKE 60 MG CAPSULE IN THE MORNING 90 capsule 1     DULoxetine (CYMBALTA) 60 MG capsule TAKE ONE CAPSULE BY MOUTH EVERY MORNING AND TAKE 30 MG CAPSULE IN THE EVENING 90 capsule 1    EPINEPHrine (ANY BX GENERIC EQUIV) 0.3 MG/0.3ML injection 2-pack Inject 0.3 mLs (0.3 mg) into the muscle as needed for anaphylaxis May repeat one time in 5-15 minutes if response to initial dose is inadequate. 2 each 1    ferrous fumarate 65 mg, [place]. FE,-Vitamin C 125 mg (VITRON-C)  MG TABS tablet Take 1 tablet by mouth every other day 60 tablet 4    fluticasone (FLONASE) 50 MCG/ACT nasal spray SPRAY 2 SPRAYS INTO BOTH NOSTRILS DAILY 48 g 11    guanFACINE (TENEX) 1 MG tablet TAKE ONE TAB BY MOUTH ONCE NIGHTLY AT BEDTIME      HYDROmorphone (DILAUDID) 2 MG tablet Take 1 tablet (2 mg) by mouth daily as needed for severe pain. 10 tablet 0    hydrOXYzine HCl (ATARAX) 25 MG tablet Take 1 tablet (25 mg) by mouth every 6 hours as needed for anxiety. 180 tablet 1    ibuprofen ([employer] IBUPROFEN) 200 MG capsule Take 600 mg by mouth every 4 hours as needed for fever      Lidocaine (LIDOCARE) 4 % Patch Place 1-3 patches onto the skin every 24 hours To prevent lidocaine toxicity, patient should be patch free for 12 hrs daily. 20 patch 0    medical cannabis (Patient's own supply.  Not a prescription) Take 1 Dose by mouth See Admin Instructions Capsule formulation - uses as needed      methocarbamol (ROBAXIN) 500 MG tablet TAKE ONE TABLET BY MOUTH FOUR TIMES A DAY 30 tablet 0    metoclopramide (REGLAN) 10 MG tablet Take 1 tablet (10 mg) by mouth 4 times daily as needed (headache). 40 tablet 3    metoclopramide (REGLAN) 5 MG tablet Take 1 tablet (5 mg) by mouth 3 times daily as needed (migraine). 12 tablet 0    Multiple Vitamins-Minerals (MULTIVITAMIN PO) Take 1 tablet by mouth daily       naloxone (NARCAN) 4 MG/0.1ML nasal spray Spray 1 spray (4 mg) into one nostril alternating nostrils once as needed for opioid reversal every 2-3 minutes until assistance arrives 0.2 mL 0    phentermine 30 MG capsule  Take 1 capsule (30 mg) by mouth every morning. 30 capsule 1    Probiotic Product (PROBIOTIC DAILY PO) Take 6 packets by mouth every morning Five-Lac      propranolol ER (INDERAL LA) 120 MG 24 hr capsule Take 1 capsule (120 mg) by mouth daily. 90 capsule 3    ramelteon (ROZEREM) 8 MG tablet TAKE ONE TABLET BY MOUTH ONCE DAILY AT BEDTIME. 30 tablet 5    rizatriptan (MAXALT-MLT) 5 MG ODT TAKE 1-2 TABLETS (5-10 MG) BY MOUTH AT ONSET OF HEADACHE FOR MIGRAINE (MAX 30 MG IN 24 HOURS) 18 tablet 11    SUMAtriptan (IMITREX STATDOSE) 6 MG/0.5ML pen injector kit Inject 0.5 ml (6 mg) subcutaneously at onset of migraine, May repeat in 1 hour , Max 12 mg/24 hrs 2 kit 11    topiramate (TOPAMAX) 100 MG tablet Take 1 tablet (100 mg) by mouth daily 90 tablet 3    traMADol (ULTRAM) 50 MG tablet Take 1 tablet (50 mg) by mouth 2 times daily as needed for pain. 50 tablet 0    ubrogepant (UBRELVY) 100 MG tablet Take 1 tablet (100 mg) by mouth at onset of headache (headache). 10 tablet 11    vitamin B complex with vitamin C (STRESS TAB) tablet Take 1 tablet by mouth daily      VITAMIN D, CHOLECALCIFEROL, PO Take 2,000 Units by mouth daily         Allergies  Allergies   Allergen Reactions    Baclofen Other (See Comments)     Loss of muscle tone. Muscle weakness.    Bee Venom Shortness Of Breath, Palpitations, Anaphylaxis and Difficulty breathing     Patient does carry Epi-Pen    Chicken-Derived Products (Egg) Nausea and Diarrhea    Compazine [Prochlorperazine]      Extreme jittery    Food      Milk    Gabapentin      Dizzy    Gluten Meal GI Disturbance     Wheat bran and wheat    Pregabalin     Seasonal Allergies      Dust, mold       Family History  family history includes Asthma in her sister and son; Cancer in her father, paternal grandfather, and paternal grandmother; Connective Tissue Disorder in her mother and sister; Diabetes in her maternal grandmother, paternal grandfather, and paternal grandmother; Heart Disease in her maternal  grandmother; Hypertension in her maternal grandmother; Migraines in her father, sister, sister, and sister; Thyroid Disease in her mother, sister, sister, and sister.    Social History  Social History     Tobacco Use    Smoking status: Former     Current packs/day: 0.00     Average packs/day: 0.2 packs/day for 22.9 years (4.6 ttl pk-yrs)     Types: Cigarettes     Start date: 1991     Quit date: 2013     Years since quittin.3     Passive exposure: Past    Smokeless tobacco: Never   Vaping Use    Vaping status: Some Days    Substances: THC   Substance Use Topics    Alcohol use: Yes     Comment: Occasionally    Drug use: Yes     Types: Marijuana     Comment: medical marijuana       Physical Exam  There is no height or weight on file to calculate BMI.  GENERAL: no distress  SKIN: Visible skin clear. No significant rash, abnormal pigmentation or lesions.  EYES: Eyes grossly normal to inspection.  No discharge or erythema, or obvious scleral/conjunctival abnormalities.  NECK: visible goiter is not present; no visible neck masses  RESP: No audible wheeze, cough, or visible cyanosis.  No visible retractions or increased work of breathing.    NEURO: Awake, alert, responds appropriately to questions.  Mentation and speech fluent.  PSYCH:affect normal and appearance well-groomed.

## 2025-04-03 NOTE — LETTER
4/3/2025      Ada Welch  [address] [facility] [address]      Dear Colleague,    Thank you for referring your patient, Ada Welch, to the [facility]. Please see a copy of my visit note below.    Virtual Visit Details    Type of service:  Video Visit     Originating Location (pt. Location): Home  {PROVIDER LOCATION On-site should be selected for visits conducted from your clinic location or adjoining [facility] hospital, academic office, or other nearby [facility] building. Off-site should be selected for all other provider locations, including home:[id number]}  Distant Location (provider location):  On-site  Platform used for Video Visit: Shiftboard Online Scheduling    Assessment/Plan :   IRS/Obesity. Ada is doing well. She continues to take 1 mg of Wegovy weekly and she feels like this is working well. She has continued to lose weight and she feels like her blood sugars have also stabilized. With her healthy sustained weight loss, I would like her to continue with the 1 mg dose. I will send in a new prescription today. We will follow-up in 6 mos.   Menopausal syndrome. Ada is not sure if she is menopausal or perimenopausal. She has not had a menstrual cycle in about 4 yrs. She didn't think that she had developed any signs of menopause until she recently listened to a podcast on the subject. She has been experiencing a lot of issues with fatigue, memory fog, and hair loss. She also has a history of vaginal dryness and her last Pap smear was very painful. We briefly discussed HRT. I would like to check hormone levels today. We may start a low dose of HRT but I would also like her to reach to a GYN to discuss this further.     Due to the COVID 19 pandemic this visit was a telephone/video visit in order to help prevent spread of infection in this patient and the general population. The patient gave verbal consent for the visit today. I have independently reviewed and interpreted labs, imaging as  indicated.     {PROVIDER LOCATION On-site should be selected for visits conducted from your clinic location or adjoining [facility] hospital, academic office, or other nearby [facility] building. Off-site should be selected for all other provider locations, including home:[id number]}  Distant Location (provider location):  On-site  Mode of Communication:  Video Conference via Stylecrook  Chart review/prep time 5    Visit Start time 11:09:27  Visit Stop time 11:21:08  21 minutes spent on the date of the encounter doing chart review, history and exam, documentation and further activities as noted above.      Chief complaint:  Ada is a 54 year old female who returns for follow-up of obesity and IRS.    I have reviewed Care Everywhere including [facility], Replaced by [facility], [facility], [facility], [facility], [facility] , [facility], [facility] lab reports, imaging reports and provider notes as indicated.      HISTORY OF PRESENT ILLNESS  Ada has noticed an improvement since getting back on Wegovy 1 mg weekly. Her weight is stable and she has noticed an improvement with her blood sugars. She is not experiencing as many low blood sugars. She has also been focused on cutting out bread, pasta, candy, and sugary treats. She feels like her metabolic syndrome as improved significant but she has also developed some other problems. She continues to struggle with fatigue, anxiousness and memory issues. She worries that she may be perimenopausal or menopausal. She has not had a menstrual cycle in about 4 yrs. She feels like the anxiousness and memory issues have continued to worsen over the last 4 yrs. She has also had a lot of hair loss. She doesn't have any eyebrows or pubic hair.    Ada has a complicated history that includes Tom-Danlos syndrome, history of hypothyroidism, obesity, POTS, HTN, lumbar and cervical radiculopathy, and migraines. She also has a history of gestational diabetes during 1 of her 5 pregnancies. In  "recent years, she has struggled with glucose fluctuations with significant hypoglycemia. She states that she can never go more than a few hours without food. Over time, this constant need to eat or snack, led to weight gain. In 2023, she was started on Wegovy to help with the weight and she finally felt \"normal.\" Her insurance did stop covering the medication in May of 2024. She had to discontinue it and ended up gaining back 50 lbs. She is back on the medication now and doing well. She also has a history of hypothyroidism.   Endocrine relevant labs are as follows:    Relevant imaging is as follows: (as read by me as it pertains to endocrine relevant organs)   Latest Reference Range & Units 05/24/24 10:27   Hemoglobin A1C 0.0 - 5.6 % 5.4      Latest Reference Range & Units 11/13/24 15:52   TSH 0.30 - 4.20 uIU/mL 1.65      Latest Reference Range & Units 12/15/23 11:19   Hemoglobin A1C <5.7 % 5.3      Latest Reference Range & Units 12/15/23 11:19   TSH 0.30 - 4.20 uIU/mL 1.90     REVIEW OF SYSTEMS    Endocrine: positive for obesity, IRS, and menopausal symptoms  Skin: positive for hair changes  Eyes: negative for, redness, tearing  Ears/Nose/Throat: negative  Respiratory: No shortness of breath, dyspnea on exertion, cough, or hemoptysis  Cardiovascular: negative for, chest pain, dyspnea on exertion, lower extremity edema, and exercise intolerance  Gastrointestinal: negative for, nausea, vomiting, constipation, and diarrhea  Genitourinary: positive for amenorrhea and vaginal dryness, negative for, nocturia, dysuria, frequency, and urgency  Musculoskeletal: negative for, muscular weakness, nocturnal cramping, and foot pain  Neurologic: positive for memory problems, negative for, numbness or tingling of hands, and numbness or tingling of feet  Psychiatric: positive for excessive stress, sleep disturbance, and feeling anxious  Hematologic/Lymphatic/Immunologic: negative      Past Medical History  Past Medical History: "   Diagnosis Date     Allergic rhinitis 09/2007     Anxiety      Arthritis 11/01/2013    Found during search for cause of back pain     Autoimmune disease 2016    Immunosuppresive     Autonomic dysfunction      Bleeding disorder 2016    Bruise easily     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     Cervicalgia      Chronic sinusitis 00/2007    Diagnosed with chronic pansinusitis 2016     Coagulation disorder     factor 8     CSF leak     Chiari malformation     Depression      Tom-Danlos disease      Eosinophilic esophagitis 08/2018     Gastroesophageal reflux disease 3/1/2017    I have large hiatal hernia     Hiatal hernia 2016    Have adeline hiatel hernia     Hoarseness Unsure    It comes and goes     Hypertension      Hypothyroid      IBS (irritable bowel syndrome) with constipation     improved on Linzess     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     Irregular heart beat      Migraines 1/1/2007    Unsure of exact date     Nasal polyps 2013    Were revoved 03/2017, benign     Orthostatic hypotension      Other nervous system complications 1/2014    Autonomic nervous system disorder, neuralgia, neuropathy     Swelling, mass, or lump in head and neck 08/2016    Lymph node has been growing for last year     Thyroid disease 01/01/2008     Urinary incontinence      Urinary urgency        Medications  Current Outpatient Medications   Medication Sig Dispense Refill     Semaglutide-Weight Management (WEGOVY) 1 MG/0.5ML pen Inject 1 mg subcutaneously once a week. 2 mL 1     acetaminophen (TYLENOL) 500 MG tablet Take 1,000 mg by mouth every 6 hours as needed for mild pain       albuterol (PROAIR HFA/PROVENTIL HFA/VENTOLIN HFA) 108 (90 Base) MCG/ACT inhaler Inhale 2 puffs into the lungs every 6 hours as needed for shortness of breath, wheezing or cough. 18 g 0     atorvastatin (LIPITOR) 20 MG tablet TAKE ONE TABLET BY MOUTH ONCE DAILY 90 tablet 3     benzonatate (TESSALON) 100  MG capsule Take 1 capsule (100 mg) by mouth 3 times daily as needed for cough. 30 capsule 0     benzonatate (TESSALON) 200 MG capsule Take 1 capsule (200 mg) by mouth 3 times daily as needed for cough. 45 capsule 0     budesonide-formoterol (SYMBICORT) 80-4.5 MCG/ACT Inhaler Inhale 2 puffs into the lungs 2 times daily. 10 g 0     buprenorphine (BUTRANS) 10 MCG/HR WK patch PLACE ONTO THE SKIN ONCE A WEEK. 4 patch 1     celecoxib (CELEBREX) 100 MG capsule Take 1 capsule (100 mg) by mouth as needed for moderate pain. 60 capsule 2     cetirizine (ZYRTEC) 10 MG tablet Take 10 mg by mouth daily as needed        co-enzyme Q-10 100 MG CAPS capsule Take 100 mg by mouth daily       cyclobenzaprine (FLEXERIL) 10 MG tablet TAKE ONE TABLET BY MOUTH THREE TIMES A DAY AS NEEDED FOR MUSCLE SPASMS 60 tablet 0     DULoxetine (CYMBALTA) 30 MG capsule Take 1 capsule (30 mg) by mouth at bedtime. TAKE ONE CAPSULE BY MOUTH EVERY NIGHT AT BEDTIME AND TAKE 60 MG CAPSULE IN THE MORNING 90 capsule 1     DULoxetine (CYMBALTA) 60 MG capsule TAKE ONE CAPSULE BY MOUTH EVERY MORNING AND TAKE 30 MG CAPSULE IN THE EVENING 90 capsule 1     EPINEPHrine (ANY BX GENERIC EQUIV) 0.3 MG/0.3ML injection 2-pack Inject 0.3 mLs (0.3 mg) into the muscle as needed for anaphylaxis May repeat one time in 5-15 minutes if response to initial dose is inadequate. 2 each 1     ferrous fumarate 65 mg, [place]. FE,-Vitamin C 125 mg (VITRON-C)  MG TABS tablet Take 1 tablet by mouth every other day 60 tablet 4     fluticasone (FLONASE) 50 MCG/ACT nasal spray SPRAY 2 SPRAYS INTO BOTH NOSTRILS DAILY 48 g 11     guanFACINE (TENEX) 1 MG tablet TAKE ONE TAB BY MOUTH ONCE NIGHTLY AT BEDTIME       HYDROmorphone (DILAUDID) 2 MG tablet Take 1 tablet (2 mg) by mouth daily as needed for severe pain. 10 tablet 0     hydrOXYzine HCl (ATARAX) 25 MG tablet Take 1 tablet (25 mg) by mouth every 6 hours as needed for anxiety. 180 tablet 1     ibuprofen ([employer] IBUPROFEN) 200 MG capsule Take  600 mg by mouth every 4 hours as needed for fever       Lidocaine (LIDOCARE) 4 % Patch Place 1-3 patches onto the skin every 24 hours To prevent lidocaine toxicity, patient should be patch free for 12 hrs daily. 20 patch 0     medical cannabis (Patient's own supply.  Not a prescription) Take 1 Dose by mouth See Admin Instructions Capsule formulation - uses as needed       methocarbamol (ROBAXIN) 500 MG tablet TAKE ONE TABLET BY MOUTH FOUR TIMES A DAY 30 tablet 0     metoclopramide (REGLAN) 10 MG tablet Take 1 tablet (10 mg) by mouth 4 times daily as needed (headache). 40 tablet 3     metoclopramide (REGLAN) 5 MG tablet Take 1 tablet (5 mg) by mouth 3 times daily as needed (migraine). 12 tablet 0     Multiple Vitamins-Minerals (MULTIVITAMIN PO) Take 1 tablet by mouth daily        naloxone (NARCAN) 4 MG/0.1ML nasal spray Spray 1 spray (4 mg) into one nostril alternating nostrils once as needed for opioid reversal every 2-3 minutes until assistance arrives 0.2 mL 0     phentermine 30 MG capsule Take 1 capsule (30 mg) by mouth every morning. 30 capsule 1     Probiotic Product (PROBIOTIC DAILY PO) Take 6 packets by mouth every morning Five-Lac       propranolol ER (INDERAL LA) 120 MG 24 hr capsule Take 1 capsule (120 mg) by mouth daily. 90 capsule 3     ramelteon (ROZEREM) 8 MG tablet TAKE ONE TABLET BY MOUTH ONCE DAILY AT BEDTIME. 30 tablet 5     rizatriptan (MAXALT-MLT) 5 MG ODT TAKE 1-2 TABLETS (5-10 MG) BY MOUTH AT ONSET OF HEADACHE FOR MIGRAINE (MAX 30 MG IN 24 HOURS) 18 tablet 11     SUMAtriptan (IMITREX STATDOSE) 6 MG/0.5ML pen injector kit Inject 0.5 ml (6 mg) subcutaneously at onset of migraine, May repeat in 1 hour , Max 12 mg/24 hrs 2 kit 11     topiramate (TOPAMAX) 100 MG tablet Take 1 tablet (100 mg) by mouth daily 90 tablet 3     traMADol (ULTRAM) 50 MG tablet Take 1 tablet (50 mg) by mouth 2 times daily as needed for pain. 50 tablet 0     ubrogepant (UBRELVY) 100 MG tablet Take 1 tablet (100 mg) by mouth  at onset of headache (headache). 10 tablet 11     vitamin B complex with vitamin C (STRESS TAB) tablet Take 1 tablet by mouth daily       VITAMIN D, CHOLECALCIFEROL, PO Take 2,000 Units by mouth daily         Allergies  Allergies   Allergen Reactions     Baclofen Other (See Comments)     Loss of muscle tone. Muscle weakness.     Bee Venom Shortness Of Breath, Palpitations, Anaphylaxis and Difficulty breathing     Patient does carry Epi-Pen     Chicken-Derived Products (Egg) Nausea and Diarrhea     Compazine [Prochlorperazine]      Extreme jittery     Food      Milk     Gabapentin      Dizzy     Gluten Meal GI Disturbance     Wheat bran and wheat     Pregabalin      Seasonal Allergies      Dust, mold       Family History  family history includes Asthma in her sister and son; Cancer in her father, paternal grandfather, and paternal grandmother; Connective Tissue Disorder in her mother and sister; Diabetes in her maternal grandmother, paternal grandfather, and paternal grandmother; Heart Disease in her maternal grandmother; Hypertension in her maternal grandmother; Migraines in her father, sister, sister, and sister; Thyroid Disease in her mother, sister, sister, and sister.    Social History  Social History     Tobacco Use     Smoking status: Former     Current packs/day: 0.00     Average packs/day: 0.2 packs/day for 22.9 years (4.6 ttl pk-yrs)     Types: Cigarettes     Start date: 1991     Quit date: 2013     Years since quittin.3     Passive exposure: Past     Smokeless tobacco: Never   Vaping Use     Vaping status: Some Days     Substances: THC   Substance Use Topics     Alcohol use: Yes     Comment: Occasionally     Drug use: Yes     Types: Marijuana     Comment: medical marijuana       Physical Exam  There is no height or weight on file to calculate BMI.  GENERAL: no distress  SKIN: Visible skin clear. No significant rash, abnormal pigmentation or lesions.  EYES: Eyes grossly normal to inspection.   No discharge or erythema, or obvious scleral/conjunctival abnormalities.  NECK: visible goiter is not present; no visible neck masses  RESP: No audible wheeze, cough, or visible cyanosis.  No visible retractions or increased work of breathing.    NEURO: Awake, alert, responds appropriately to questions.  Mentation and speech fluent.  PSYCH:affect normal and appearance well-groomed.          Again, thank you for allowing me to participate in the care of your patient.        Sincerely,        Noemy Veloz PA-C    Electronically signed

## 2025-04-03 NOTE — TELEPHONE ENCOUNTER
There seems to be a lot going on here? I recommend she be evaluated by anyone and if severe head aches be evaluated in the ED.    Thanks JOSIE Clemens

## 2025-04-04 ENCOUNTER — VIRTUAL VISIT (OUTPATIENT)
Dept: PHARMACY | Facility: CLINIC | Age: 55
End: 2025-04-04
Payer: COMMERCIAL

## 2025-04-04 DIAGNOSIS — R51.9 CHRONIC INTRACTABLE HEADACHE, UNSPECIFIED HEADACHE TYPE: Primary | ICD-10-CM

## 2025-04-04 DIAGNOSIS — G89.29 CHRONIC INTRACTABLE HEADACHE, UNSPECIFIED HEADACHE TYPE: Primary | ICD-10-CM

## 2025-04-04 DIAGNOSIS — Q79.60 EHLERS-DANLOS SYNDROME: ICD-10-CM

## 2025-04-04 DIAGNOSIS — M96.1 POSTLAMINECTOMY SYNDROME, LUMBAR REGION: ICD-10-CM

## 2025-04-04 PROCEDURE — 99605 MTMS BY PHARM NP 15 MIN: CPT | Mod: 93

## 2025-04-04 NOTE — LETTER
_  Medication List        Prepared on: Apr 4, 2025     Bring your Medication List when you go to the doctor, hospital, or   emergency room. And, share it with your family or caregivers.     Note any changes to how you take your medications.  Cross out medications when you no longer use them.    Medication How I take it Why I use it Prescriber   acetaminophen (TYLENOL) 500 MG tablet Take 1,000 mg by mouth every 6 hours as needed for mild pain Pain Patient Reported   albuterol (PROAIR HFA/PROVENTIL HFA/VENTOLIN HFA) 108 (90 Base) MCG/ACT inhaler Inhale 2 puffs into the lungs every 6 hours as needed for shortness of breath, wheezing or cough. Acute cough FIDELINA Enamorado CNP   amoxicillin-clavulanate (AUGMENTIN) 875-125 MG tablet Take 1 tablet by mouth 2 times daily for 5 days. Acute recurrent maxillary sinusitis Ellyn Rivera MD   atorvastatin (LIPITOR) 20 MG tablet TAKE ONE TABLET BY MOUTH ONCE DAILY Mixed Hyperlipidemia Ellyn Rivera MD   benzonatate (TESSALON) 100 MG capsule Take 1 capsule (100 mg) by mouth 3 times daily as needed for cough. Cough, unspecified type [place] PA-C   benzonatate (TESSALON) 200 MG capsule Take 1 capsule (200 mg) by mouth 3 times daily as needed for cough. Acute cough FIDELINA Enamorado CNP   Botulinum Toxin Type A (BOTOX) 200 units injection 155 Units Every 12 weeks Intractable chronic migraine without aura and with status migrainosus Noemy Nguyen MD   budesonide-formoterol (SYMBICORT) 80-4.5 MCG/ACT Inhaler Inhale 2 puffs into the lungs 2 times daily. Upper respiratory tract infection, unspecified type Ellyn Rivera MD   buprenorphine (BUTRANS) 10 MCG/HR WK patch PLACE ONTO THE SKIN ONCE A WEEK. Postlaminectomy syndrome Faiza Martinez MD   celecoxib (CELEBREX) 100 MG capsule Take 1 capsule (100 mg) by mouth as needed for moderate pain. Postlaminectomy Syndrome, Lumbar Region Ellyn Rivera MD   cetirizine (ZYRTEC) 10 MG tablet Take 10 mg by mouth daily as needed   Allergies Patient Reported   co-enzyme Q-10 100 MG CAPS capsule Take 100 mg by mouth daily Supplement Patient Reported   cyclobenzaprine (FLEXERIL) 10 MG tablet TAKE ONE TABLET BY MOUTH THREE TIMES A DAY AS NEEDED FOR MUSCLE SPASMS Postlaminectomy syndrome Donna Campbell MD   DULoxetine (CYMBALTA) 30 MG capsule Take 1 capsule (30 mg) by mouth at bedtime. TAKE ONE CAPSULE BY MOUTH EVERY NIGHT AT BEDTIME AND TAKE 60 MG CAPSULE IN THE MORNING Lumbar Post-Laminectomy Syndrome Faiza Martinez MD   DULoxetine (CYMBALTA) 60 MG capsule TAKE ONE CAPSULE BY MOUTH EVERY MORNING AND TAKE 30 MG CAPSULE IN THE EVENING Postlaminectomy syndrome; Lumbar Post-Laminectomy Syndrome Faiza Martinez MD   EPINEPHrine (ANY BX GENERIC EQUIV) 0.3 MG/0.3ML injection 2-pack Inject 0.3 mLs (0.3 mg) into the muscle as needed for anaphylaxis May repeat one time in 5-15 minutes if response to initial dose is inadequate. Allergic reaction, sequela Ellyn Rivera MD   ferrous fumarate 65 mg, [place]. FE,-Vitamin C 125 mg (VITRON-C)  MG TABS tablet Take 1 tablet by mouth every other day Supplement Ellyn Rivera MD   fluticasone (FLONASE) 50 MCG/ACT nasal spray SPRAY 2 SPRAYS INTO BOTH NOSTRILS DAILY Chronic Rhinitis Ellyn Rivera MD   guanFACINE (TENEX) 1 MG tablet TAKE ONE TAB BY MOUTH ONCE NIGHTLY AT BEDTIME Anxiety Patient Reported   HYDROmorphone (DILAUDID) 2 MG tablet Take 1 tablet (2 mg) by mouth daily as needed for severe pain. Failed back surgical syndrome Faiza Martinez MD   hydrOXYzine HCl (ATARAX) 25 MG tablet Take 1 tablet (25 mg) by mouth every 6 hours as needed for anxiety. Anxiety Ellyn Rivera MD   ibuprofen ([facility] IBUPROFEN) 200 MG capsule Take 600 mg by mouth every 4 hours as needed for fever Pain Patient Reported   Lidocaine (LIDOCARE) 4 % Patch Place 1-3 patches onto the skin every 24 hours To prevent lidocaine toxicity, patient should be patch free for 12 hrs daily. Gastroesophageal reflux disease with esophagitis Alana Mack V,  MD   medical cannabis (Patient's own supply.  Not a prescription) Take 1 Dose by mouth See Admin Instructions Capsule formulation - uses as needed Pain Entered By History Unknown   methocarbamol (ROBAXIN) 500 MG tablet TAKE ONE TABLET BY MOUTH FOUR TIMES A DAY Postlaminectomy syndrome; Tom-Danlos Syndrome; Failed back surgical syndrome Donna Campbell MD   methylPREDNISolone (MEDROL DOSEPAK) 4 MG tablet therapy pack Follow Package Directions Intractable chronic migraine without aura and without status migrainosus Noemy Nguyen MD   metoclopramide (REGLAN) 10 MG tablet Take 1 tablet (10 mg) by mouth 4 times daily as needed (headache). Intractable chronic migraine without aura and without status migrainosus Noemy Nguyen MD   Multiple Vitamins-Minerals (MULTIVITAMIN PO) Take 1 tablet by mouth daily  Supplement Patient Reported   naloxone (NARCAN) 4 MG/0.1ML nasal spray Spray 1 spray (4 mg) into one nostril alternating nostrils once as needed for opioid reversal every 2-3 minutes until assistance arrives Postlaminectomy Syndrome of Lumbar Region Faiza Martinez MD   phentermine 30 MG capsule Take 1 capsule (30 mg) by mouth every morning. BMI 29.0-29.9,Adult Ellyn Rivera MD   Probiotic Product (PROBIOTIC DAILY PO) Take 6 packets by mouth every morning Five-Lac Supplement Patient Reported   propranolol ER (INDERAL LA) 120 MG 24 hr capsule Take 1 capsule (120 mg) by mouth daily. Autonomic dysfunction Noemy Nguyen MD   ramelteon (ROZEREM) 8 MG tablet TAKE ONE TABLET BY MOUTH ONCE DAILY AT BEDTIME. Insomnia, unspecified type Ellyn Rivera MD   rizatriptan (MAXALT-MLT) 5 MG ODT TAKE 1-2 TABLETS (5-10 MG) BY MOUTH AT ONSET OF HEADACHE FOR MIGRAINE (MAX 30 MG IN 24 HOURS) Intractable chronic migraine without aura and without status migrainosus Noemy Nguyen MD   Semaglutide-Weight Management (WEGOVY) 1 MG/0.5ML pen Inject 1 mg subcutaneously once a week. BMI  31.0-31.9,Adult Noemy Veloz PA-C   SUMAtriptan (IMITREX STATDOSE) 6 MG/0.5ML pen injector kit Inject 0.5 ml (6 mg) subcutaneously at onset of migraine, May repeat in 1 hour , Max 12 mg/24 hrs Intractable chronic migraine without aura and without status migrainosus Noemy Nguyen MD   topiramate (TOPAMAX) 100 MG tablet Take 1 tablet (100 mg) by mouth daily BMI 31.0-31.9,Adult Ellyn Rivera MD   traMADol (ULTRAM) 50 MG tablet Take 1 tablet (50 mg) by mouth 2 times daily as needed for pain. Postlaminectomy syndrome Faiza Martinez MD   ubrogepant (UBRELVY) 100 MG tablet Take 1 tablet (100 mg) by mouth at onset of headache (headache). Intractable chronic migraine without aura and with status migrainosus Noemy Nguyen MD   vitamin B complex with vitamin C (STRESS TAB) tablet Take 1 tablet by mouth daily Supplement Patient Reported   VITAMIN D, CHOLECALCIFEROL, PO Take 2,000 Units by mouth daily Supplement Entered By History Unknown         Add new medications, over-the-counter drugs, herbals, vitamins, or  minerals in the blank rows below.    Medication How I take it Why I use it Prescriber                                      Allergies:      - Baclofen - Other (See Comments)  - Bee Venom - Shortness Of Breath, Palpitations, Anaphylaxis, Difficulty breathing  - Chicken-derived Products (egg) - Nausea, Diarrhea  - Compazine [prochlorperazine]  - Food  - Gabapentin  - Gluten Meal - GI Disturbance  - Pregabalin  - Seasonal Allergies        Side effects I have had:      Not on File        Other Information:              My notes and questions:

## 2025-04-04 NOTE — LETTER
"Recommended To-Do List      Prepared on: Apr 4, 2025       You can get the best results from your medications by completing the items on this \"To-Do List.\"      Bring your To-Do List when you go to your doctor. And, share it with your family or caregivers.    My To-Do List:  What we talked about: What I should do:    Migraines   I asked Dr. Nguyen about referring to neurology MTM              "

## 2025-04-04 NOTE — PROGRESS NOTES
Medication Therapy Management (MTM) Encounter    ASSESSMENT:                            Medication Adherence/Access: No issues identified.    Headache (Migraine)  Follows with Neurology. Migraines are not well controlled at this time and patient is interested in discussing alternative migraine medications. She may benefit from referral to neurology MTM given complex comorbidities and numerous medications that have been tried/failed.      Pain (EDS, Postlaminectomy Syndrome)  Follows with Pain Management.    PLAN:                            I asked Dr. Nguyen about referring to neurology MTM    Follow-up: with Primary Care MTM as needed    SUBJECTIVE/OBJECTIVE:                          Ada Welch is a 54 year old female seen for a follow-up visit. Patient was previously following with MTM pharmacist, Kirk Burger.    Reason for visit: 6 month follow up.    Allergies/ADRs: Reviewed in chart  Past Medical History: Reviewed in chart  Tobacco: She reports that she quit smoking about 11 years ago. Her smoking use included cigarettes. She started smoking about 34 years ago. She has a 4.6 pack-year smoking history. She has been exposed to tobacco smoke. She has never used smokeless tobacco.  Alcohol: Rarely    Medication Adherence/Access: no issues reported.    Headache (Migraine) - Follows with Neurology  Preventive medications  Botox every 3 months - last given 3/25/25  Topiramate 100 mg daily (taking for weight loss)  Propranolol  mg daily (taking for autonomic dysfunction)  Acute medications  Sumatriptan 6 mg subcutaneously at migraine onset  Rizatriptan 5-10 mg at migraine onset  Ubrelvy 100 mg at migraine onset  Metoclopramide 10 mg 4 times daily as needed  Patient reports having migraines daily since having pain/brain study last week. They are more intense and more severe than migraines she's had in the past. Normally her acute medications are helpful, but they are only providing temporary relief at this  time. She notes migraine cocktails were helpful in the past and is wondering if this is something she could try. She is interested in meeting with neurology MTM.  Associated symptoms include: Vision distortion, nausea, photophobia   Past medications tried/failed: Amitriptyline, zonisamide, gabapentin, Aimovig, Emgality, duloxetine, tizanidine, propranolol      Pain (EDS, Postlaminectomy Syndrome) - Follows with Pain Management   Acetaminophen 1000 mg every 6 hours as needed  Celecoxib 100 mg daily as needed  Ibuprofen 600 mg 4 times daily as needed  Ketorolac 10 mg every 6 hours as needed  Cyclobenzaprine 10 mg 3 times daily as needed - takes during the day  Methocarbamol 500 mg 4 times daily - takes at night  Duloxetine 90 mg daily  Butrans patch 10 mcg/hr weekly  Hydromorphone 2 mg daily as needed - takes every 3-4 months, only for severe pain   Tramadol 50 mg twice daily as needed - takes twice daily  Narcan nasal spray as needed - has on hand, hasn't needed to use  Lidocaine 4% 1-3 patches daily as needed  Patient reports no current medication side effects, but she didn't tolerate pregabalin, gabapentin, baclofen, or methadone. Pain has not been managable for the last 6 months, follows closely with pain management. She doesn't want to increase dose/frequency of pain medications. Is also using medical cannabis/THC balms, which provide some relief.     Unable to review all medications and conditions today due to time restraints.    Today's Vitals: LMP  (LMP Unknown)   ----------------    I spent 60 minutes with this patient today.     A summary of these recommendations was sent via [employer].    Yeimi Sharma (Hailie), PharmD, MPH  Medication Therapy Management Pharmacist     Telemedicine Visit Details  The patient's medications can be safely assessed via a telemedicine encounter.  Type of service:  Telephone visit  Originating Location (pt. Location): Home    Distant Location (provider location):  On-site  Start  Time:  11:00 AM  End Time:  12:00 PM     Medication Therapy Recommendations  No medication therapy recommendations to display

## 2025-04-04 NOTE — LETTER
April 8, 2025  Ada L Rebekah  [address] [address]    Dear Ms. Welch, [facility]     Thank you for talking with me on Apr 4, 2025 about your health and medications. As a follow-up to our conversation, I have included two documents:      Your Recommended To-Do List has steps you should take to get the best results from your medications.  Your Medication List will help you keep track of your medications and how to take them.    If you want to talk about these documents, please call Allison Sharma RPH at phone: [phone], Monday-Friday 8-4:30pm.    I look forward to working with you and your doctors to make sure your medications work well for you.    Sincerely,  Allison Sharma RPH  [facility] Pharmacist, [facility]

## 2025-04-04 NOTE — Clinical Note
Hi Dr. Nguyen,   I met with Ada for a med review last week and she expressed interest in discussing alternative medications for migraines. I thought she might benefit from a referral to neurology MTM given complex comorbidities and numerous medications that have been tried/failed, and Ada expressed interest in this. Would you be willing to place a referral to neurology MTM?  Thanks for considering! Yeimi Sharma (Hailie), NarcisoD, MPH Medication Therapy Management Pharmacist

## 2025-04-07 DIAGNOSIS — M96.1 FAILED BACK SURGICAL SYNDROME: ICD-10-CM

## 2025-04-07 DIAGNOSIS — Q79.60 EHLERS-DANLOS SYNDROME: ICD-10-CM

## 2025-04-07 DIAGNOSIS — M96.1 POSTLAMINECTOMY SYNDROME: ICD-10-CM

## 2025-04-07 RX ORDER — METHOCARBAMOL 500 MG/1
500 TABLET, FILM COATED ORAL 4 TIMES DAILY
Qty: 30 TABLET | Refills: 0 | Status: SHIPPED | OUTPATIENT
Start: 2025-04-07

## 2025-04-07 RX ORDER — CYCLOBENZAPRINE HCL 10 MG
10 TABLET ORAL 3 TIMES DAILY PRN
Qty: 60 TABLET | Refills: 0 | Status: SHIPPED | OUTPATIENT
Start: 2025-04-07

## 2025-04-07 NOTE — TELEPHONE ENCOUNTER
Refill request    Medication/Sig:   methocarbamol (ROBAXIN) 500 MG tablet-TAKE ONE TABLET BY MOUTH FOUR TIMES A DAY     cyclobenzaprine (FLEXERIL) 10 MG tablet-TAKE ONE TABLET BY MOUTH THREE TIMES A DAY AS NEEDED FOR MUSCLE SPASMS     Dispensed/Refills:   methocarbamol 30/0  Cyclobenzaprine-60/0  Last prescribed to patient: both-2/26/25     Last clinic appointment: 8/19/24  Next clinic appointment: none listed    Preferred pharmacy:    [facility] [id number] [place] [address]    Refill request routed to the provider to review.

## 2025-04-08 DIAGNOSIS — R68.2 DRY MOUTH: Primary | ICD-10-CM

## 2025-04-08 ASSESSMENT — PATIENT HEALTH QUESTIONNAIRE - PHQ9
SUM OF ALL RESPONSES TO PHQ QUESTIONS 1-9: 9
10. IF YOU CHECKED OFF ANY PROBLEMS, HOW DIFFICULT HAVE THESE PROBLEMS MADE IT FOR YOU TO DO YOUR WORK, TAKE CARE OF THINGS AT HOME, OR GET ALONG WITH OTHER PEOPLE: SOMEWHAT DIFFICULT
SUM OF ALL RESPONSES TO PHQ QUESTIONS 1-9: 9

## 2025-04-08 ASSESSMENT — ANXIETY QUESTIONNAIRES
5. BEING SO RESTLESS THAT IT IS HARD TO SIT STILL: NOT AT ALL
GAD7 TOTAL SCORE: 1
3. WORRYING TOO MUCH ABOUT DIFFERENT THINGS: NOT AT ALL
7. FEELING AFRAID AS IF SOMETHING AWFUL MIGHT HAPPEN: NOT AT ALL
7. FEELING AFRAID AS IF SOMETHING AWFUL MIGHT HAPPEN: NOT AT ALL
1. FEELING NERVOUS, ANXIOUS, OR ON EDGE: SEVERAL DAYS
2. NOT BEING ABLE TO STOP OR CONTROL WORRYING: NOT AT ALL
IF YOU CHECKED OFF ANY PROBLEMS ON THIS QUESTIONNAIRE, HOW DIFFICULT HAVE THESE PROBLEMS MADE IT FOR YOU TO DO YOUR WORK, TAKE CARE OF THINGS AT HOME, OR GET ALONG WITH OTHER PEOPLE: NOT DIFFICULT AT ALL
GAD7 TOTAL SCORE: 1
6. BECOMING EASILY ANNOYED OR IRRITABLE: NOT AT ALL
4. TROUBLE RELAXING: NOT AT ALL
8. IF YOU CHECKED OFF ANY PROBLEMS, HOW DIFFICULT HAVE THESE MADE IT FOR YOU TO DO YOUR WORK, TAKE CARE OF THINGS AT HOME, OR GET ALONG WITH OTHER PEOPLE?: NOT DIFFICULT AT ALL
GAD7 TOTAL SCORE: 1

## 2025-04-08 NOTE — PATIENT INSTRUCTIONS
"Recommendations from today's MTM visit:                                                    MTM (medication therapy management) is a service provided by a clinical pharmacist designed to help you get the most of out of your medicines.   Today we reviewed what your medicines are for, how to know if they are working, that your medicines are safe and how to make your medicine regimen as easy as possible.      I will ask Dr. Nguyen about referring to neurology MTM    Follow-up: with Primary Care MTM as needed    It was great speaking with you today.  I value your experience and would be very thankful for your time in providing feedback in our clinic survey. In the next few days, you may receive an email or text message from [email or web address] with a link to a survey related to your  clinical pharmacist.\"     To schedule another MTM appointment, please call the clinic directly or you may call the MTM scheduling line at [phone] or toll-free at [phone].     My Clinical Pharmacist's contact information:                                                      Please feel free to contact me with any questions or concerns you have.      Yeimi Sharma (Hailie), NarcisoD, MPH  Medication Therapy Management Pharmacist    "

## 2025-04-09 ENCOUNTER — VIRTUAL VISIT (OUTPATIENT)
Dept: PALLIATIVE MEDICINE | Facility: OTHER | Age: 55
End: 2025-04-09
Attending: ANESTHESIOLOGY
Payer: COMMERCIAL

## 2025-04-09 DIAGNOSIS — M96.1 FAILED BACK SURGICAL SYNDROME: ICD-10-CM

## 2025-04-09 PROCEDURE — 90791 PSYCH DIAGNOSTIC EVALUATION: CPT | Mod: 95 | Performed by: SOCIAL WORKER

## 2025-04-09 ASSESSMENT — COLUMBIA-SUICIDE SEVERITY RATING SCALE - C-SSRS
1. IN THE PAST MONTH, HAVE YOU WISHED YOU WERE DEAD OR WISHED YOU COULD GO TO SLEEP AND NOT WAKE UP?: NO
1. IN THE PAST MONTH, HAVE YOU WISHED YOU WERE DEAD OR WISHED YOU COULD GO TO SLEEP AND NOT WAKE UP?: NO
2. HAVE YOU ACTUALLY HAD ANY THOUGHTS OF KILLING YOURSELF LIFETIME?: NO
2. HAVE YOU ACTUALLY HAD ANY THOUGHTS OF KILLING YOURSELF IN THE PAST MONTH?: NO
6. HAVE YOU EVER DONE ANYTHING, STARTED TO DO ANYTHING, OR PREPARED TO DO ANYTHING TO END YOUR LIFE?: NO

## 2025-04-09 NOTE — PROGRESS NOTES
"[facility]         PATIENT'S NAME: Ada Welch  PREFERRED NAME: Ada  PRONOUNS:       MRN: [id number]  : 1970  ADDRESS: [address] [facility] [address]  ACCT. NUMBER:  [id number]  DATE OF SERVICE: 25  START TIME: 1500  END TIME: 1600  PREFERRED PHONE: [phone]   May we leave a program related message: Yes  EMERGENCY CONTACT: was obtained   .  SERVICE MODALITY:  Video Visit:      Provider verified identity through the following two step process.  Patient provided:  Patient was verified at admission/transfer    Telemedicine Visit: The patient's condition can be safely assessed and treated via synchronous audio and visual telemedicine encounter.      Reason for Telemedicine Visit: Patient has requested telehealth visit and Services only offered telehealth    Originating Site (Patient Location): Patient's home    Distant Site (Provider Location): Provider Remote Setting- Home Office    Consent:  The patient/guardian has verbally consented to: the potential risks and benefits of telemedicine (video visit) versus in person care; bill my insurance or make self-payment for services provided; and responsibility for payment of non-covered services.     Patient would like the video invitation sent by:  My Chart    Mode of Communication:  [facility] Conference [facility]    Distant Location (Provider):  Off-site    As the provider I attest to compliance with applicable laws and regulations related to telemedicine.    UNIVERSAL ADULT Mental Health DIAGNOSTIC ASSESSMENT    Identifying Information:  Patient is a 54 year old,   individual.  Patient was referred for an assessment by  Pain Center Provider/Dr. Faiza Martinez referring provider.  Patient attended the session alone.    Chief Complaint:   The reason for seeking services at this time is: \"Evaluation for spinal cord stimulator\".  The problem(s) began 14.    Patient has attempted to resolve these " concerns in the past through multiple medial modalities and hx of psychotherapy.  Pt is interested in the Spinal Cord Stimulator to improve quality of life and pain management .  Pt has a very complex medical hx and has been struggling with chronic pain/chronic illnesses for many years.  Pain at times is very debilitating and impacts all areas of her life.    Does the patient  :  Have a reasonable understanding of what the SCS actually is? (as below)  - Understand that the SCS is implanted surgically, beneath their skin, with wires placed into their spinal column? YES    - Understand that the SCS is made of metal, and that wires may remain in their body permanently? YES    - Understand that the SCS is battery powered, and the battery likely will need to be replaced after 7-10 years? YES    - Understand that the permanent implant is preceded by a trial, where the wires are placed into the spine, but they come out to the surface, and are plugged into an external battery that the patient wears for the duration of the trial (5-7 days)? YES       Know what the SCS is doing, and what it is not doing? (as below)      - Know that the SCS is distracting their nervous system from the pain they otherwise would feel? YES  - Know that it is not curing the source of the problem? YES    - know the battery needs to be charged weekly, and sometimes daily (depending on how much power they use), and eventually replaced after 7-10 years involving another small surgery?YES    - Know there are generally several months of reprogramming and fine-tuning that are needed after the implant to get the settings  just right ? YES  - Know they will have a remote control, need to learn how to use it to get the most out of the stimulator? YES       Understand the general process start to finish including:    - an understanding their health insurance is presented with all their past treatments tried (meds, procedures, therapy, etc.) and they give  approval or denial for the SCS trial? YES      - if the trial is approved and then scheduled that the scheduling is contingent upon appointment availability?  YES    - that the trial takes generally 1 week, at end of trial the patient decides if there was good stimulation/coverage of the painful areas? YES    -an understanding there are bench marks for proceeding with implant such as the need to have at least 50% relief of pain, 50% reduction in medication doses and 50% improvement in function? YES    -an understanding that request for prior approval from the insurance of the permanent SCS implant is required? YES    -an understanding that only when approval is granted is there an attempt to schedule patient and that this schedule is also contingent on provider availability? YES      -an understanding that the implant procedure is done in an outpatient surgery center and that s/he goes home from the surgery center the same day? YES      -an understanding that s/he returns to clinic approximately 1 week post op and has the reprograming done by device company at that time? YES    -an understanding that s/he returns to clinic at 2-3 weeks post op; has additional reprogramming, and that some restrictions are lifted for driving, lifting, bending, twisting? YES    -an understanding that s/he returns to clinic at 6 weeks post-op; fine-turning of programming (if needed), and all restrictions are lifted? YES      -an understanding that additional, as needed, visits for more reprogramming can be done out of the office, with just the device company? YES    - an understanding that generally at this point the pain provider will begin again with things like PT and start to make changes in medications (weaning off nerve-pain meds, and narcotics) if we have not already started this process. YES           Social/Family History:  Patient reported they grew up in [place], [place].  They were raised by biological parents;  grandmother; grandfather; aunt; uncle; other Family, close friends.  Parents .  Patient reported that their childhood had a lot of challenges after father passed away when she was 15 years old.  Pt was close relationship with her mother, who is alive.  She was also close with grandparents and extended family. Pt. Has 2 siblings.  Patient described their current relationships with family of origin as close and supportive..     The patient describes their cultural background as .  Cultural influences and impact on patient's life structure, values, norms, and healthcare: I [place] town with a large [place] family. My mom has spent half her life researching and educating my sisters and me on our geneology which is much more diverse and extremely interesting. We were raised with unconditional love and guidance to treat others the way we wanted to be treated. We learned to work hard and make the effort to do the job correctly the first time. We gave back to people less fortunate even though we didn t have a lot after my Dad . My mom worked really hard to make sure we never felt like we were lacking anything though. We grew up attending [employer] but I don t currently attend because my exhusband refused to attend [place] and I didn t want to confuse the kids so I continued their education at home. I prefer to raise my kids in a larger town or city because it offers so many more opportunities than growing up in a small town. Small towns have benefits but not as many as the larger areas..  Contextual influences on patient's health include: Individual Factors someone who understand complex medical concerns and pain .    These factors will be addressed in the Preliminary Treatment plan. Patient identified their preferred language to be English. Patient reported they does not need the assistance of an  or other support involved in therapy.      Patient reported had no significant delays in developmental tasks.   Patient's highest education level was some college  .  Patient identified the following learning problems: none reported.  Modifications will not be used to assist communication in therapy.   Patient reports they are  able to understand written materials.      Patient's current relationship status is  and current going through divorce process, which has been very difficult. Pt described relationship as toxic and unhealthy.  Patient identified their sexual orientation as heterosexual.  Patient reported having 5 child(wendi), 4 adult children and one 13 year old daughter that lives with her.  Patient identified mother; adult child; friends as part of their support system.  Patient identified the quality of these relationships as stable and meaningful,  .      Patient's current living/housing situation involves staying in own home/apartment.  The immediate members of family and household include Timothy Welch 13,Daughter   and they report that housing is stable.    Patient is currently disabled.  Patient reports their finances are obtained through SSDI disability; other. Patient does identify finances as a current stressor.      Patient reported that they have been involved with the legal system.  Working through a divorce  . Patient does not report being under probation/ parole/ jurisdiction. They are not under any current court jurisdiction. .    Patient's Strengths and Limitations:  Patient identified the following strengths or resources that will help them succeed in treatment: commitment to health and well being, exercise routine, emmett / spirituality, friends / good social support, family support, insight, intelligence, motivation, strong social skills, and work ethic. Things that may interfere with the patient's success in treatment include: financial hardship and physical health concerns.     Assessments:  The following assessments  were completed by patient for this visit:  PHQ9:       8/15/2017     7:36 AM 9/21/2017     4:59 PM 10/23/2019     8:01 AM 1/3/2020     8:38 AM 11/13/2020    11:38 AM 10/31/2024    10:41 AM 4/8/2025     3:52 PM   PHQ-9 SCORE   PHQ-9 Total Score MyChart 0 0  2 (Minimal depression)   9 (Mild depression)   PHQ-9 Total Score 0 0 0 2 8 8 9        Patient-reported     GAD7:       10/31/2019     8:48 AM 1/22/2020     2:47 PM 11/13/2020    11:38 AM 8/5/2021     6:53 AM 7/12/2022     9:03 AM 10/31/2024    10:41 AM 4/8/2025     3:53 PM   SAMANTHA-7 SCORE   Total Score 0 (minimal anxiety) 1 (minimal anxiety)  0 (minimal anxiety) 1 (minimal anxiety)  1 (minimal anxiety)   Total Score 0 1 0 0 1 2 1        Patient-reported     CAGE-AID:       4/8/2025     4:41 PM   CAGE-AID Total Score   Total Score 0    Total Score MyChart 0 (A total score of 2 or greater is considered clinically significant)       Patient-reported     PROMIS 10-Global Health (only subscores and total score):       8/29/2016     1:38 AM 9/2/2016     2:52 PM 4/26/2018    10:46 AM 7/10/2018     8:45 PM 9/20/2022    10:03 PM 4/8/2025     4:41 PM   PROMIS-10 Scores Only   Global Mental Health Score   11 13 15 13    Global Physical Health Score  8 7 6 8 9    PROMIS TOTAL - SUBSCORES   18 19 23 22    Global Physical Health Score : Raw Score 5 (SUM : G03 - G06 - G07 - G08)        Global Mental Health Score : Raw Score 9 (SUM : G02 - G04 - G05 - G10)        Total (Physical + Mental Health Score) 14            Patient-reported     [place] Suicide Severity Rating Scale (Lifetime/Recent)      12/17/2018     9:02 AM 3/3/2019     5:56 PM 6/23/2024     6:34 PM 7/2/2024    10:26 AM 4/9/2025     4:00 PM   [place] Suicide Severity Rating (Lifetime/Recent)   Q1 Wish to be Dead (Lifetime)     No   Q2 Non-Specific Active Suicidal Thoughts (Lifetime)     No   Q1 Wished to be Dead (Past Month) no no 0-->no 0-->no 0-->no   Q2 Suicidal Thoughts (Past Month) no no 0-->no 0-->no 0-->no   Q6  Suicide Behavior (Lifetime) no no 0-->no 0-->no 0-->no   Level of Risk per Screen   no risks indicated no risks indicated no risks indicated       Personal and Family Medical History:  Patient does report a family history of mental health concerns.  Patient reports family history includes Asthma in her sister and son; Cancer in her father, paternal grandfather, and paternal grandmother; Connective Tissue Disorder in her mother and sister; Diabetes in her maternal grandmother, paternal grandfather, and paternal grandmother; Heart Disease in her maternal grandmother; Hypertension in her maternal grandmother; Migraines in her father, sister, sister, and sister; Thyroid Disease in her mother, sister, sister, and sister..     Patient does report Mental Health Diagnosis and/or Treatment.  Patient Patient reported the following previous diagnoses which include(s): an Anxiety Disorder, Depression, and PTSD.  Patient reported symptoms began with adjustment to health issues and most recently the divorce process .Pr has complex medical hx and it appears to have an impact on her mental health. Her PHQ-9 and SAMANTHA-7 score were low.   Pt feels that trauma hx comes from childhood, loss of father at a young age, health issues as she got older and unhealthy marriage.  Patient has received mental health services in the past: therapy with external provider .  Psychiatric Hospitalizations: None.  Patient denies a history of civil commitment.  Patient is not receiving other mental health services.  These include none.     Patient has had a physical exam to rule out medical causes for current symptoms.  Date of last physical exam was within the past year. Client was encouraged to follow up with PCP if symptoms were to develop. The patient has a [facility] Provider, who is named Ellyn Rivera..  Patient reports the following current medical concerns: multiple and complex medical concerns. .  She reported that she has always had 2  vertebrae that would pop out.  She stated that in her early her adult life, she moved to an old farm house. She stated there was black mold in this farmhouse. She then developed cluster migraines. She was prescribed fluoroquinolone and subsequently developed chronic pansinusitis. 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 She thinks this medication sped up her back issues. She reported that she then became pregnant which 40 for the fifth time. She stated that she had constant pain after her last few pregnancies. She reported that she had spinal fusion surgery and developed complications. She reported that several of the screws from her surgery came out and she developed severe back pain.  She reports she was bed ridden for 5 years.  Reports pain has been very difficult to manage and is interested in the Spinal Cord Stimulator to help as a pain management tool.  Patient reports pain concerns including back pain, multiple locations w/ headaches.  Patient's pain provider is Dr. Faiza Martinez..   There are not significant appetite / nutritional concerns / weight changes. She has been working on weight loss and healthy eating. She is involved/participate in [employer] shakes program   Patient does not report a history of head injury / trauma / cognitive impairment.       Patient reports current meds as:   Current Outpatient Medications   Medication Sig Dispense Refill    acetaminophen (TYLENOL) 500 MG tablet Take 1,000 mg by mouth every 6 hours as needed for mild pain      albuterol (PROAIR HFA/PROVENTIL HFA/VENTOLIN HFA) 108 (90 Base) MCG/ACT inhaler Inhale 2 puffs into the lungs every 6 hours as needed for shortness of breath, wheezing or cough. 18 g 0    amoxicillin-clavulanate (AUGMENTIN) 875-125 MG tablet Take 1 tablet by mouth 2 times daily for 5 days. 10 tablet 0    atorvastatin (LIPITOR) 20 MG tablet TAKE ONE TABLET BY MOUTH ONCE DAILY 90 tablet 3    benzonatate (TESSALON) 100 MG capsule Take 1 capsule (100  mg) by mouth 3 times daily as needed for cough. 30 capsule 0    benzonatate (TESSALON) 200 MG capsule Take 1 capsule (200 mg) by mouth 3 times daily as needed for cough. 45 capsule 0    budesonide-formoterol (SYMBICORT) 80-4.5 MCG/ACT Inhaler Inhale 2 puffs into the lungs 2 times daily. 10 g 0    buprenorphine (BUTRANS) 10 MCG/HR WK patch PLACE ONTO THE SKIN ONCE A WEEK. 4 patch 1    celecoxib (CELEBREX) 100 MG capsule Take 1 capsule (100 mg) by mouth as needed for moderate pain. 60 capsule 2    cetirizine (ZYRTEC) 10 MG tablet Take 10 mg by mouth daily as needed       co-enzyme Q-10 100 MG CAPS capsule Take 100 mg by mouth daily      cyclobenzaprine (FLEXERIL) 10 MG tablet TAKE ONE TABLET BY MOUTH THREE TIMES A DAY AS NEEDED FOR MUSCLE SPASMS 60 tablet 0    DULoxetine (CYMBALTA) 30 MG capsule Take 1 capsule (30 mg) by mouth at bedtime. TAKE ONE CAPSULE BY MOUTH EVERY NIGHT AT BEDTIME AND TAKE 60 MG CAPSULE IN THE MORNING 90 capsule 1    DULoxetine (CYMBALTA) 60 MG capsule TAKE ONE CAPSULE BY MOUTH EVERY MORNING AND TAKE 30 MG CAPSULE IN THE EVENING 90 capsule 1    EPINEPHrine (ANY BX GENERIC EQUIV) 0.3 MG/0.3ML injection 2-pack Inject 0.3 mLs (0.3 mg) into the muscle as needed for anaphylaxis May repeat one time in 5-15 minutes if response to initial dose is inadequate. 2 each 1    ferrous fumarate 65 mg, [place]. FE,-Vitamin C 125 mg (VITRON-C)  MG TABS tablet Take 1 tablet by mouth every other day 60 tablet 4    fluticasone (FLONASE) 50 MCG/ACT nasal spray SPRAY 2 SPRAYS INTO BOTH NOSTRILS DAILY 48 g 11    guanFACINE (TENEX) 1 MG tablet TAKE ONE TAB BY MOUTH ONCE NIGHTLY AT BEDTIME      HYDROmorphone (DILAUDID) 2 MG tablet Take 1 tablet (2 mg) by mouth daily as needed for severe pain. 10 tablet 0    hydrOXYzine HCl (ATARAX) 25 MG tablet Take 1 tablet (25 mg) by mouth every 6 hours as needed for anxiety. 180 tablet 1    ibuprofen ([employer] IBUPROFEN) 200 MG capsule Take 600 mg by mouth every 4 hours as needed for  fever      Lidocaine (LIDOCARE) 4 % Patch Place 1-3 patches onto the skin every 24 hours To prevent lidocaine toxicity, patient should be patch free for 12 hrs daily. 20 patch 0    medical cannabis (Patient's own supply.  Not a prescription) Take 1 Dose by mouth See Admin Instructions Capsule formulation - uses as needed      methocarbamol (ROBAXIN) 500 MG tablet TAKE ONE TABLET BY MOUTH FOUR TIMES A DAY 30 tablet 0    methylPREDNISolone (MEDROL DOSEPAK) 4 MG tablet therapy pack Follow Package Directions 21 tablet 0    metoclopramide (REGLAN) 10 MG tablet Take 1 tablet (10 mg) by mouth 4 times daily as needed (headache). 40 tablet 3    Multiple Vitamins-Minerals (MULTIVITAMIN PO) Take 1 tablet by mouth daily       naloxone (NARCAN) 4 MG/0.1ML nasal spray Spray 1 spray (4 mg) into one nostril alternating nostrils once as needed for opioid reversal every 2-3 minutes until assistance arrives 0.2 mL 0    phentermine 30 MG capsule Take 1 capsule (30 mg) by mouth every morning. 30 capsule 1    Probiotic Product (PROBIOTIC DAILY PO) Take 6 packets by mouth every morning Five-Lac      propranolol ER (INDERAL LA) 120 MG 24 hr capsule Take 1 capsule (120 mg) by mouth daily. 90 capsule 3    ramelteon (ROZEREM) 8 MG tablet TAKE ONE TABLET BY MOUTH ONCE DAILY AT BEDTIME. 30 tablet 5    rizatriptan (MAXALT-MLT) 5 MG ODT TAKE 1-2 TABLETS (5-10 MG) BY MOUTH AT ONSET OF HEADACHE FOR MIGRAINE (MAX 30 MG IN 24 HOURS) 18 tablet 11    Semaglutide-Weight Management (WEGOVY) 1 MG/0.5ML pen Inject 1 mg subcutaneously once a week. 2 mL 1    SUMAtriptan (IMITREX STATDOSE) 6 MG/0.5ML pen injector kit Inject 0.5 ml (6 mg) subcutaneously at onset of migraine, May repeat in 1 hour , Max 12 mg/24 hrs 2 kit 11    topiramate (TOPAMAX) 100 MG tablet Take 1 tablet (100 mg) by mouth daily 90 tablet 3    traMADol (ULTRAM) 50 MG tablet Take 1 tablet (50 mg) by mouth 2 times daily as needed for pain. 50 tablet 0    ubrogepant (UBRELVY) 100 MG tablet Take  1 tablet (100 mg) by mouth at onset of headache (headache). 10 tablet 11    vitamin B complex with vitamin C (STRESS TAB) tablet Take 1 tablet by mouth daily      VITAMIN D, CHOLECALCIFEROL, PO Take 2,000 Units by mouth daily       Current Facility-Administered Medications   Medication Dose Route Frequency Provider Last Rate Last Admin    Botulinum Toxin Type A (BOTOX) 200 units injection 155 Units  155 Units Intramuscular See Admin Instructions    145 Units at 03/25/25 1225       Medication Adherence:  Patient reports taking.  taking psychiatric medications as prescribed.    Patient Allergies:    Allergies   Allergen Reactions    Baclofen Other (See Comments)     Loss of muscle tone. Muscle weakness.    Bee Venom Shortness Of Breath, Palpitations, Anaphylaxis and Difficulty breathing     Patient does carry Epi-Pen    Chicken-Derived Products (Egg) Nausea and Diarrhea    Compazine [Prochlorperazine]      Extreme jittery    Food      Milk    Gabapentin      Dizzy    Gluten Meal GI Disturbance     Wheat bran and wheat    Pregabalin     Seasonal Allergies      Dust, mold       Medical History:    Past Medical History:   Diagnosis Date    Allergic rhinitis 09/2007    Anxiety     Arthritis 11/01/2013    Found during search for cause of back pain    Autoimmune disease 2016    Immunosuppresive    Autonomic dysfunction     Bleeding disorder 2016    Bruise easily    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    Cervicalgia     Chronic sinusitis 00/2007    Diagnosed with chronic pansinusitis 2016    Coagulation disorder     factor 8    CSF leak     Chiari malformation    Depression     Tom-Danlos disease     Eosinophilic esophagitis 08/2018    Gastroesophageal reflux disease 3/1/2017    I have large hiatal hernia    Hiatal hernia 2016    Have adeline hiatel hernia    Hoarseness Unsure    It comes and goes    Hypertension     Hypothyroid     IBS (irritable bowel syndrome) with constipation     improved on  Linzess    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    Irregular heart beat     Migraines 1/1/2007    Unsure of exact date    Nasal polyps 2013    Were revoved 03/2017, benign    Orthostatic hypotension     Other nervous system complications 1/2014    Autonomic nervous system disorder, neuralgia, neuropathy    Swelling, mass, or lump in head and neck 08/2016    Lymph node has been growing for last year    Thyroid disease 01/01/2008    Urinary incontinence     Urinary urgency          Current Mental Status Exam:   Appearance:  Appropriate    Eye Contact:  Good   Psychomotor:  Normal       Gait / station:  not observed  Attitude / Demeanor: Cooperative   Speech      Rate / Production: Normal/ Responsive      Volume:  Normal  volume      Language:  intact  Mood:   Anxious   Affect:   Appropriate    Thought Content: Clear   Thought Process: Coherent       Associations: No loosening of associations  Insight:   Good   Judgment:  Intact   Orientation:  All  Attention/concentration: Fair    Substance Use:   Patient did not report a family history of substance use concerns; see medical history section for details.  Patient has not received chemical dependency treatment in the past.  Patient has not ever been to detox.      Patient is not currently receiving any chemical dependency treatment.           Substance History of use Age of first use Date of last use     Pattern and duration of use (include amounts and frequency)   Alcohol currently use   15 we had wine at [place] 12/24/24 Very minimal use, socially 1-2 drinks a few times per  year.   Cannabis   currently use 20 tried it once in college. I use medical cannabis now 04/08/25   Medical cannabis    Amphetamines   never used     REPORTS SUBSTANCE USE: N/A   Cocaine/crack    never used       REPORTS SUBSTANCE USE: N/A   Hallucinogens never used         REPORTS SUBSTANCE USE: N/A   Inhalants never used         REPORTS SUBSTANCE USE: N/A   Heroin  never used         REPORTS SUBSTANCE USE: N/A   Other Opiates used in the past 21 first used for birth of daughter Use now for pain flares 03/26/25 As prescribed for pain management   Benzodiazepine   used in the past 43 Was used for pain management 01/01/14 As prescribed   Barbiturates never used     REPORTS SUBSTANCE USE: N/A   Over the counter meds currently use 1 I was a sick baby 03/27/25 As prescribed   Caffeine currently use 9 We grew up drinking Pepsi then Coca [employer]a   daily   Nicotine  used in the past 20 11/01/13 none   Other substances not listed above:  Identify:  never used     REPORTS SUBSTANCE USE: N/A     Patient reported the following problems as a result of their substance use: no problems, not applicable.  Patient reports obtaining substances from  .    Substance Use: No symptoms    Based on the CAGE score of 0 and clinical interview there  are not indications of drug or alcohol abuse.    Significant Losses / Trauma / Abuse / Neglect Issues:   Patient did not   serve in the .  There are indications or report of significant loss, trauma, abuse or neglect issues related to: death of father at 15, major medical problems  , and divorce / relational changes   .  Patient has not been a victim of exploitation.  Concerns for possible neglect are not present.      Safety Assessment:   Patient denies current or past homicidal ideation and behaviors.  Patient denies current or past suicidal ideation and behaviors.  Patient denies current or past self-injurious behaviors.  Patient denied risk behaviors associated with substance use.  Patient denies any high risk behaviors associated with mental health symptoms.  Patient denied current or past personal safety concerns.    Patient denies past of current/recent assaultive behaviors.    Patient denied a history of sexual assault behaviors.     Patient reports there are not   firearms in the house.    Patient reports the following protective factors:  identifies reason for living, access to and engagement with healthcare, strong bond to family unit, community, job, school, etc, supportive social network or family, and responsibilities to others forward or future oriented thinking; dedication to family or friends; safe and stable environment; effectively controls impulses; sense of belonging; purpose; help seeking behaviors when distressed; adherence with prescribed medication; effective problem solving skills; commitment to well being; sense of meaning; strong sense of self worth or esteem; sense of personal control or determination    Risk Plan:  See Recommendations for Safety and Risk Management Plan    Review of Symptoms per patient report:   Depression: Change in energy level, Change in sleep, Change in appetite, Difficulties concentrating, and Feelings of helplessness  Shikha:  No Symptoms  Psychosis: No Symptoms  Anxiety: Nervousness, Physical complaints, such as headaches, stomachaches, muscle tension, and Sleep disturbance  Panic:  No symptoms  Post Traumatic Stress Disorder:  Experienced traumatic event childhood losses, major medical concerns/pain, unhealthy relationship hx    Eating Disorder: No Symptoms  ADD / ADHD:  No symptoms  Conduct Disorder: No symptoms  Autism Spectrum Disorder: No symptoms  Obsessive Compulsive Disorder: No Symptoms  Personality Disorders:   no symptoms    Patient reports the following compulsive behaviors and treatment history: None.      Diagnostic Criteria:   Adjustment Disorder  A. The development of emotional or behavioral symptoms in response to an identifiable stressor(s) occurring within 3 months of the onset of the stressor(s)  B. These symptoms or behaviors are clinically significant, as evidenced by one or both of the following:       - Marked distress that is out of proportion to the severity/intensity of the stressor (with consideration for external context & culture)       - Significant impairment in social,  occupational, or other important areas of functioning  C. The stress-related disturbance does not meet criteria for another disorder & is not not an exacerbation of another mental disorder  D. The symptoms do not represent normal bereavement  E. Once the stressor or its consequences have terminated, the symptoms do not persist for more than an additional 6 months       * Adjustment Disorder with Mixed Anxiety and Depressed Mood: The predominant manifestation is a combination of depression and anxiety    Rule Out PTSD- needs further assessment    Functional Status:  Patient reports the following functional impairments:  chronic disease management, management of the household and or completion of tasks, and self-care.     Nonprogrammatic care:  Patient is requesting basic services to address current mental health concerns.    Clinical Summary:  1. Psychosocial Factors:  Medical complexities, Trauma history, Relationship concerns, Limited social supports, Financial strain, Legal involvement.  Cultural and Contextual Factors: Pt is a 54 year old, ,  woman with 4 adult children and on teenage daughter w/ health concerns, lives independently, no learning disability, in the process of divorce, english primary language, [employer] background.  2. Principal DSM5 Diagnoses  (Sustained by DSM5 Criteria Listed Above):   Adjustment Disorders  309.28 (F43.23) With mixed anxiety and depressed mood.  3. Other Diagnoses that is relevant to services:   Rule out PTSD (needs further assessment).  4. Provisional Diagnosis:   5. Prognosis: Unknown.  6. Likely consequences of symptoms if not treated:  .  7. Patient strengths include:  caring, educated, empathetic, goal-focused, good listener, has a previous history of therapy, insightful, intelligent, motivated, open to learning, responsible parent, support of family, friends and providers, supportive, wants to learn, willing to ask questions, willing to relate to others,  and work history .     Recommendations:     1. Plan for Safety and Risk Management:   Safety and Risk: Recommended that patient call 911 or go to the local ED should there be a change in any of these risk factors        Report to child / adult protection services was NA.     2. Patient's identified  None identified .     3. Initial Treatment will focus on:    Adjustment Difficulties related to: family concerns and chronic health concerns .     4. Resources/Service Plan:    services are not indicated.   Modifications to assist communication are not indicated.   Additional disability accommodations are not indicated.      5. Collaboration:   Collaboration / coordination of treatment will be initiated with the following  support professionals:  Pain Center Provider .      6.  Referrals:   The following referral(s) will be initiated: Outpatient [facility].       A Release of Information has been obtained for the following:  None .     Clinical Substantiation/medical necessity for the above recommendations:  I declare these services are medically necessary and appropriate to the recipient's diagnosis and needs.  .    7. MAURI:    MAURI:  Discussed the general effects of drugs and alcohol on health and well-being. Provider gave patient printed information about the  effects of chemical use on their health and well being. Recommendations:  Take medications as prescribed .     8. Records:   These were reviewed at time of assessment.   Information in this assessment was obtained from the medical record and  provided by patient who is a good historian.    Patient's access to their mental health medical record will be withheld due to the following reason(s): therapeutic relationship .    9.   Interactive Complexity: No    10. Safety Plan: No Safety plan indicated    11.  Spinal Cord Stimulator Evaluation:  Patient appears to have a good understanding of the Spinal Cord Stimulator process.  She has reasonable  expectations.  She makes her own medical decisions and follows through with recommendations.  She lives independently and reports she has some support that she can access during procedures.  Pt reports hx of PTSD, but would need further assessment.   Adjustment disorder w/ anxiety & depression at this time due to her current stressors and coping with chronic pain/chronic illness.  She has requested to follow up with psychotherapy on a bi-weekly basis and scheduled follow up with writer.  She has multiple concerns/questions regarding the SCS that she wants to discuss further w/ her providers. She has no hx of chemical dependency concerns.        Provider Name/ Credentials:  Padmini Gomez, JUAN, BELINDA  April 9, 2025

## 2025-04-14 ENCOUNTER — LAB (OUTPATIENT)
Dept: LAB | Facility: CLINIC | Age: 55
End: 2025-04-14
Payer: COMMERCIAL

## 2025-04-14 DIAGNOSIS — R73.9 BLOOD SUGAR INCREASED: ICD-10-CM

## 2025-04-14 DIAGNOSIS — E03.9 ACQUIRED HYPOTHYROIDISM: ICD-10-CM

## 2025-04-14 DIAGNOSIS — R68.2 DRY MOUTH: ICD-10-CM

## 2025-04-14 DIAGNOSIS — R53.83 OTHER FATIGUE: ICD-10-CM

## 2025-04-14 DIAGNOSIS — Z86.39 HISTORY OF THYROID DISEASE: ICD-10-CM

## 2025-04-14 DIAGNOSIS — L65.9 HAIR LOSS: ICD-10-CM

## 2025-04-14 LAB
ANION GAP SERPL CALCULATED.3IONS-SCNC: 10 MMOL/L (ref 7–15)
BUN SERPL-MCNC: 11.2 MG/DL (ref 6–20)
CALCIUM SERPL-MCNC: 9.5 MG/DL (ref 8.8–10.4)
CHLORIDE SERPL-SCNC: 105 MMOL/L (ref 98–107)
CREAT SERPL-MCNC: 0.98 MG/DL (ref 0.51–0.95)
EGFRCR SERPLBLD CKD-EPI 2021: 69 ML/MIN/1.73M2
ERYTHROCYTE [DISTWIDTH] IN BLOOD BY AUTOMATED COUNT: 12.9 % (ref 10–15)
EST. AVERAGE GLUCOSE BLD GHB EST-MCNC: 91 MG/DL
FSH SERPL IRP2-ACNC: 35.5 MIU/ML
GLUCOSE SERPL-MCNC: 90 MG/DL (ref 70–99)
HBA1C MFR BLD: 4.8 % (ref 0–5.6)
HCO3 SERPL-SCNC: 24 MMOL/L (ref 22–29)
HCT VFR BLD AUTO: 38.9 % (ref 35–47)
HGB BLD-MCNC: 12.9 G/DL (ref 11.7–15.7)
MCH RBC QN AUTO: 30.9 PG (ref 26.5–33)
MCHC RBC AUTO-ENTMCNC: 33.2 G/DL (ref 31.5–36.5)
MCV RBC AUTO: 93 FL (ref 78–100)
PLATELET # BLD AUTO: 191 10E3/UL (ref 150–450)
POTASSIUM SERPL-SCNC: 4.7 MMOL/L (ref 3.4–5.3)
RBC # BLD AUTO: 4.17 10E6/UL (ref 3.8–5.2)
SODIUM SERPL-SCNC: 139 MMOL/L (ref 135–145)
TSH SERPL DL<=0.005 MIU/L-ACNC: 3.3 UIU/ML (ref 0.3–4.2)
WBC # BLD AUTO: 4.7 10E3/UL (ref 4–11)

## 2025-04-14 PROCEDURE — 85027 COMPLETE CBC AUTOMATED: CPT

## 2025-04-14 PROCEDURE — 83001 ASSAY OF GONADOTROPIN (FSH): CPT

## 2025-04-14 PROCEDURE — 80048 BASIC METABOLIC PNL TOTAL CA: CPT

## 2025-04-14 PROCEDURE — 36415 COLL VENOUS BLD VENIPUNCTURE: CPT

## 2025-04-14 PROCEDURE — 83036 HEMOGLOBIN GLYCOSYLATED A1C: CPT

## 2025-04-14 PROCEDURE — 86235 NUCLEAR ANTIGEN ANTIBODY: CPT | Performed by: INTERNAL MEDICINE

## 2025-04-14 PROCEDURE — 82670 ASSAY OF TOTAL ESTRADIOL: CPT

## 2025-04-14 PROCEDURE — 83498 ASY HYDROXYPROGESTERONE 17-D: CPT

## 2025-04-14 PROCEDURE — 84443 ASSAY THYROID STIM HORMONE: CPT

## 2025-04-15 LAB
ENA SS-A AB SER IA-ACNC: <0.5 U/ML
ENA SS-A AB SER IA-ACNC: NEGATIVE
ENA SS-B IGG SER IA-ACNC: <0.6 U/ML
ENA SS-B IGG SER IA-ACNC: NEGATIVE
ESTRADIOL SERPL-MCNC: <5 PG/ML

## 2025-04-15 RX ORDER — TOPIRAMATE 100 MG/1
100 TABLET, FILM COATED ORAL DAILY
Qty: 90 TABLET | Refills: 1 | Status: SHIPPED | OUTPATIENT
Start: 2025-04-15

## 2025-04-15 NOTE — TELEPHONE ENCOUNTER
Medication Requested: topiramate (TOPAMAX) 100 MG tablet   ---------------------  Last Written Prescription: 5/3/2024 Disp:90 R:3  ---------------------  Last Visit Date: 3/19/2025  ---------------------    [x]  Refill decision: Medication unable to be refilled by RN due to: Anti-Seizure Meds Protocol  Failed    [x]    Other: Medication not indicated for associated diagnosis    Request from pharmacy:  Requested Prescriptions   Pending Prescriptions Disp Refills    topiramate (TOPAMAX) 100 MG tablet [Pharmacy Med Name: TOPIRAMATE 100MG TABS] 90 tablet 3     Sig: TAKE ONE TABLET BY MOUTH ONCE DAILY       Anti-Seizure Meds Protocol  Failed - 4/15/2025 11:49 AM        Failed - Review Authorizing provider's last note.      Refer to last progress notes: confirm request is for original authorizing provider (cannot be through other providers).          Failed - Medication indicated for associated diagnosis     Medication is associated with one or more of the following diagnoses:     Bipolar   Dementia   Depression   Epilepsy   Migraine   Seizure   Trigeminal Neuralgia   Cyclothymia          Passed - Normal ALT or AST on file in past 26 months     Recent Labs   Lab Test 11/13/24  1552   ALT 13     Recent Labs   Lab Test 11/13/24  1552   AST 21             Passed - Medication is active on med list and the sig matches. RN to manually verify dose and sig if red X/fail.     If the protocol passes (green check), you do not need to verify med dose and sig.    A prescription matches if they are the same clinical intention.    For Example: once daily and every morning are the same.    The protocol can not identify upper and lower case letters as matching and will fail.     For Example: Take 1 tablet (50 mg) by mouth daily     TAKE 1 TABLET (50 MG) BY MOUTH DAILY    For all fails (red x), verify dose and sig.    If the refill does match what is on file, the RN can still proceed to approve the refill request.       If they do not  match, route to the appropriate provider.             Passed - Has GFR on file in past 12 months and most recent value is normal        Passed - Recent (12 mo) or future (90 days) visit within the authorizing provider's specialty     The patient must have completed an in-person or virtual visit within the past 12 months or has a future visit scheduled within the next 90 days with the authorizing provider s specialty.  Urgent care and e-visits do not qualify as an office visit for this protocol.          Passed - No active pregnancy on record        Passed - No positive pregnancy test in last 12 months

## 2025-04-15 NOTE — TELEPHONE ENCOUNTER
Anti-Seizure Meds Protocol  Failed    Medication indicated for associated diagnosis    Medication is associated with one or more of the following diagnoses:                Bipolar              Dementia              Depression              Epilepsy              Migraine              Seizure              Trigeminal Neuralgia              Cyclothymia

## 2025-04-16 DIAGNOSIS — E03.9 ACQUIRED HYPOTHYROIDISM: Primary | ICD-10-CM

## 2025-04-16 RX ORDER — LEVOTHYROXINE SODIUM 25 UG/1
25 TABLET ORAL
Qty: 90 TABLET | Refills: 1 | Status: SHIPPED | OUTPATIENT
Start: 2025-04-16

## 2025-04-17 ENCOUNTER — TELEPHONE (OUTPATIENT)
Dept: ENDOCRINOLOGY | Facility: CLINIC | Age: 55
End: 2025-04-17
Payer: COMMERCIAL

## 2025-04-17 LAB — 17OHP SERPL-MCNC: 13 NG/DL

## 2025-04-17 NOTE — TELEPHONE ENCOUNTER
"Called pt and LVM relaying providers recommendations stating \"You are menopausal. I would like you to reach out to your GYN to discuss hormone replacement therapy. This should help with some of your ongoing symptoms.     Also, your TSH is trending in the wrong direction. I would like to restart you on a low dose of thyroid replacement. I am going to send in a new prescription for 25 mcg of levothyroxine daily\".    So NOLAN RN  [facility]    "

## 2025-04-22 DIAGNOSIS — M96.1 POSTLAMINECTOMY SYNDROME: ICD-10-CM

## 2025-04-22 NOTE — TELEPHONE ENCOUNTER
Last Written Prescription:   Disp Refills Start End SYD   phentermine 30 MG capsule 30 capsule 1 2/11/2025 -- No   Sig - Route: Take 1 capsule (30 mg) by mouth every morning. - Oral     ----------------------  Last Visit Date:     3/19/2025  [facility] [place]      Future Visit Date: 0  ----------------------      Refill decision: Medication unable to be refilled by RN due to: Controlled medication        Request from pharmacy:  Requested Prescriptions   Pending Prescriptions Disp Refills    phentermine 30 MG capsule [Pharmacy Med Name: PHENTERMINE HCL 30MG CAPS] 30 capsule 1     Sig: TAKE ONE CAPSULE BY MOUTH EVERY MORNING       Rx Protocol Controlled Substance Failed - 4/22/2025  4:10 PM        Failed - Urine drug screeen results on file in past 12 months     [unfilled]           Failed - Medication not refilled in past 28 days     Invalid Medication Grouper          Failed - Auto Fail - Please forward to Provider        Passed - Visit with relevant provider in past 3 months or upcoming 3 months        Passed - Medication is active on med list and the sig matches        Passed - Controlled Substance Agreement on file in last 12 months     Please review last Controlled Substance Pain agreement document.   CSA -- Encounter Level on 03/07/2018:     [Media Unavailable] Controlled Substance Agreement - Scan on 4/19/2018  2:16 PM: CONTROLLED SUBSTANCE AGREEMENT       CSA -- Encounter Level on 09/27/2016:     [Media Unavailable] Controlled Substance Agreement - Scan on 9/28/2016 12:42 PM: CONTROLLED SUBSTANCE AGREEMENT       CSA -- Patient Level:     [Media Unavailable] Controlled Substance Agreement - Opioid - Scan on 8/21/2024  2:42 PM   [Media Unavailable] Controlled Substance Agreement - Opioid - Scan on 11/13/2021  2:46 PM               Passed - No active pregnancy on record        Passed - No pregnancy test in past 12 months or most recent test was negative

## 2025-04-22 NOTE — TELEPHONE ENCOUNTER
Controlled substance refill request notes    Refill request received for: Phentermine 30 Mg Capsule  [place]  data reviewed 04/22/25:  Medication last refill: qty 30, 30 day supply, filled/sold to patient on 03/31/2025  Pended order: Phentermine 30 Mg Capsule, qty 30, 30 day supply, fill on or after 04/30/2025     Primary care provider: Ellyn Rivera  Last office visit with this department: 10/31/2024  Last virtual visit with this department: 3/19/2025  Next appointment with PCP: None    Refill request forwarded to provider for review.     Jeri DUBOSE LPN  [facility]

## 2025-04-23 RX ORDER — PHENTERMINE HYDROCHLORIDE 30 MG/1
30 CAPSULE ORAL EVERY MORNING
Qty: 30 CAPSULE | Refills: 1 | Status: SHIPPED | OUTPATIENT
Start: 2025-04-30

## 2025-04-23 RX ORDER — BUPRENORPHINE 10 UG/H
PATCH TRANSDERMAL WEEKLY
Qty: 4 PATCH | Refills: 1 | Status: SHIPPED | OUTPATIENT
Start: 2025-04-23

## 2025-04-23 NOTE — TELEPHONE ENCOUNTER
Refill request    Medication: buprenorphine (BUTRANS) 10 MCG/HR WK patch     Sig: PLACE ONTO THE SKIN ONCE A WEEK.     Dispensed: 4  Refills: 1  SOLD to the pt on: 3/31/25     Last clinic appointment: 1/28/25  Next clinic appointment: 4/30/25    Last Drug Screen Collected: 5/25/23  Controlled Substance Agreement signed: none found      Preferred pharmacy:    [facility] [id number] [place] [address]      Refill request routed to the provider to review.

## 2025-04-29 DIAGNOSIS — M96.1 POSTLAMINECTOMY SYNDROME, LUMBAR REGION: ICD-10-CM

## 2025-04-30 ENCOUNTER — OFFICE VISIT (OUTPATIENT)
Dept: ANESTHESIOLOGY | Facility: CLINIC | Age: 55
End: 2025-04-30
Payer: COMMERCIAL

## 2025-04-30 DIAGNOSIS — R51.9 CHRONIC INTRACTABLE HEADACHE, UNSPECIFIED HEADACHE TYPE: Primary | ICD-10-CM

## 2025-04-30 DIAGNOSIS — G89.29 CHRONIC INTRACTABLE HEADACHE, UNSPECIFIED HEADACHE TYPE: Primary | ICD-10-CM

## 2025-04-30 DIAGNOSIS — M96.1 POSTLAMINECTOMY SYNDROME: ICD-10-CM

## 2025-04-30 PROCEDURE — 64405 NJX AA&/STRD GR OCPL NRV: CPT | Mod: LT | Performed by: ANESTHESIOLOGY

## 2025-04-30 PROCEDURE — 99207 PR NO CHARGE INJECTABLE MED/DRUG: CPT | Performed by: ANESTHESIOLOGY

## 2025-04-30 PROCEDURE — 64450 NJX AA&/STRD OTHER PN/BRANCH: CPT | Mod: LT | Performed by: ANESTHESIOLOGY

## 2025-04-30 RX ORDER — TRAMADOL HYDROCHLORIDE 50 MG/1
50 TABLET ORAL EVERY 6 HOURS PRN
Qty: 65 TABLET | Refills: 0 | Status: SHIPPED | OUTPATIENT
Start: 2025-05-18

## 2025-04-30 RX ORDER — BUPIVACAINE HYDROCHLORIDE 5 MG/ML
2 INJECTION, SOLUTION EPIDURAL; INTRACAUDAL; PERINEURAL ONCE
Status: COMPLETED | OUTPATIENT
Start: 2025-04-30 | End: 2025-04-30

## 2025-04-30 RX ADMIN — BUPIVACAINE HYDROCHLORIDE 10 MG: 5 INJECTION, SOLUTION EPIDURAL; INTRACAUDAL; PERINEURAL at 12:16

## 2025-04-30 NOTE — NURSING NOTE
RN reviewed AVS with patient. Patient to contact clinic if any questions/concerns. Patient verbalized understanding.    Skye Jernigan RN

## 2025-04-30 NOTE — PATIENT INSTRUCTIONS
Medications:    Tramadol 50 mg tablets ordered. Take 1 tablet (50 mg) by mouth every 6 hours as needed for pain.     *For refills of opioid medications - You MUST call (Or MyChart) the clinic DIRECTLY and at least 7 days before you run out of your medication.        Treatment planning:    Left Occipital Nerve Block completed.  Call us with any concerns for infection: redness and drainage at the injection site, fever, chills, sweats       Recommended Follow up:      Follow up as needed.        To speak with a nurse, schedule/reschedule/cancel a clinic appointment, or request a medication refill call: [phone]    You can also reach us by [facility]: [email or web address]

## 2025-04-30 NOTE — PROGRESS NOTES
Patient: Ada Welch Age: 54 year old   MRN: [id number] Attending: Dr. Martinez     Date of Visit: April 30, 2025      PAIN MEDICINE CLINIC PROCEDURE NOTE    ATTENDING CLINICIAN:    Faiza Martinez MD    PROCEDURE(S) PERFORMED:  Left greater occipital nerve block   Left lesser occipital nerve block     INDICATIONS:  Ada Welch is a 54 year old female with a history of chronic headache possibly secondary to occipital neuralgia.  The patient states that the patient was in their usual state of health and denied recent anticoagulant use or recent infections.  Therefore, the plan is to perform above mentioned procedure.     Procedure Details:  The patient was met in the exam room, where the patient was identified by name, medical record number and date of birth.  All of the patient s last minute questions were answered. Written informed consent was obtained and saved in the electronic medical record, after the risks, benefits, and alternatives were discussed with the patient.      A formal time-out procedure was performed, as per protocol, including patient name, title of procedure, and site of procedure, and all in the room concurred.  Routine monitors were applied.      The patient was placed in the sitting position on the exam room table. A chlorhexidine prep was completed on area just inferior to insertion of the left superior trapezius followed by sterile draping per standard procedure. Using palpation, the area was localized one third the distance between the occipital protuberance and the right mastoid process. After negative aspiration for heme, a 30G 1.5 inch needle was used to inject 1.5 mL of a treatment mixture containing  2 mL of bupivacaine 0.5% in a fan like distribution.  The needle was then removed.  I then performed left lesser occipital nerve block by infiltration of 0.5 ml of above solution in the midway between the greater occipital nerve and the mastoid process.     Light pressure was held at  the puncture site(s) to prevent ecchymosis and oozing.  The patient's skin was cleansed, and hemostasis was confirmed.      Condition:    The patient remained awake and alert throughout the procedure.  The patient tolerated the procedure well and was monitored for approximately 15 minutes afterward in the post procedure area.  There were no immediate post procedure complications noted.  The patient was then discharged to home as per protocol.      Pre-procedure pain score: 8/10  Post-procedure pain score: 3/10         cc

## 2025-04-30 NOTE — LETTER
4/30/2025       RE: Ada Welch  [address] [facility] [address]     Dear Colleague,    Thank you for referring your patient, Ada Welch, to the [facility] [place] at [facility]. Please see a copy of my visit note below.    Patient: Ada Welch Age: 54 year old   MRN: [id number] Attending: Dr. Martinez     Date of Visit: April 30, 2025      PAIN MEDICINE CLINIC PROCEDURE NOTE    ATTENDING CLINICIAN:    Faiza Martinez MD    PROCEDURE(S) PERFORMED:  Left greater occipital nerve block   Left lesser occipital nerve block     INDICATIONS:  Ada Welch is a 54 year old female with a history of chronic headache possibly secondary to occipital neuralgia.  The patient states that the patient was in their usual state of health and denied recent anticoagulant use or recent infections.  Therefore, the plan is to perform above mentioned procedure.     Procedure Details:  The patient was met in the exam room, where the patient was identified by name, medical record number and date of birth.  All of the patient s last minute questions were answered. Written informed consent was obtained and saved in the electronic medical record, after the risks, benefits, and alternatives were discussed with the patient.      A formal time-out procedure was performed, as per protocol, including patient name, title of procedure, and site of procedure, and all in the room concurred.  Routine monitors were applied.      The patient was placed in the sitting position on the exam room table. A chlorhexidine prep was completed on area just inferior to insertion of the left superior trapezius followed by sterile draping per standard procedure. Using palpation, the area was localized one third the distance between the occipital protuberance and the right mastoid process. After negative aspiration for heme, a 30G 1.5 inch needle was  used to inject 1.5 mL of a treatment mixture containing  2 mL of bupivacaine 0.5% in a fan like distribution.  The needle was then removed.  I then performed left lesser occipital nerve block by infiltration of 0.5 ml of above solution in the midway between the greater occipital nerve and the mastoid process.     Light pressure was held at the puncture site(s) to prevent ecchymosis and oozing.  The patient's skin was cleansed, and hemostasis was confirmed.      Condition:    The patient remained awake and alert throughout the procedure.  The patient tolerated the procedure well and was monitored for approximately 15 minutes afterward in the post procedure area.  There were no immediate post procedure complications noted.  The patient was then discharged to home as per protocol.      Pre-procedure pain score: 8/10  Post-procedure pain score: 3/10         cc      Again, thank you for allowing me to participate in the care of your patient.      Sincerely,    Faiza Martinez MD

## 2025-05-01 ENCOUNTER — VIRTUAL VISIT (OUTPATIENT)
Dept: PALLIATIVE MEDICINE | Facility: OTHER | Age: 55
End: 2025-05-01
Payer: COMMERCIAL

## 2025-05-01 DIAGNOSIS — F43.23 ADJUSTMENT DISORDER WITH MIXED ANXIETY AND DEPRESSED MOOD: Primary | ICD-10-CM

## 2025-05-01 DIAGNOSIS — G89.4 CHRONIC PAIN SYNDROME: ICD-10-CM

## 2025-05-01 PROCEDURE — 90837 PSYTX W PT 60 MINUTES: CPT | Mod: 95 | Performed by: SOCIAL WORKER

## 2025-05-01 RX ORDER — CELECOXIB 100 MG/1
100 CAPSULE ORAL PRN
Qty: 90 CAPSULE | Refills: 0 | Status: SHIPPED | OUTPATIENT
Start: 2025-05-01

## 2025-05-01 NOTE — TELEPHONE ENCOUNTER
NSAID Medications Failed    Rerun Protocol (5/1/2025 10:38 AM)    Always Fail Criteria - Chart Review Required    Validate Diagnosis. If the medication is requested for an acute flare of a chronic pain associated with a musculoskeletal or rheumatologic condition; okay to authorize if all other criteria are met. If not, then forward to provider for review.

## 2025-05-01 NOTE — TELEPHONE ENCOUNTER
Last Written Prescription:  celecoxib (CELEBREX) 100 MG capsule 60 capsule 2 11/8/2024 -- No   Sig - Route: Take 1 capsule (100 mg) by mouth as needed for moderate pain. - Oral   Sent to pharmacy as: Celecoxib 100 MG Oral Capsule (celeBREX)   Class: E-Prescribe     ----------------------  Last Visit Date: 3/19/25  Future Visit Date: 5/6/25  ----------------------      Refill decision: Medication unable to be refilled by RN due to: Medication not included in refill protocol policy     Associated Diagnoses    Postlaminectomy syndrome, lumbar region [M96.1]          Request from pharmacy:  Requested Prescriptions   Pending Prescriptions Disp Refills    celecoxib (CELEBREX) 100 MG capsule [Pharmacy Med Name: CELECOXIB 100MG CAPS] 60 capsule 2     Sig: TAKE 1 CAPSULE (100 MG) BY MOUTH AS NEEDED FOR MODERATE PAIN.       NSAID Medications Failed - 5/1/2025 10:33 AM        Failed - Always Fail Criteria - Chart Review Required     Validate Diagnosis. If the medication is requested for an acute flare of a chronic pain associated with a musculoskeletal or rheumatologic condition; okay to authorize if all other criteria are met. If not, then forward to provider for review.          Passed - Most recent blood pressure under 140/90 in past 12 months     BP Readings from Last 3 Encounters:   11/12/24 111/79   10/31/24 104/74   09/12/24 92/60       Systolic (Patient Reported): 0 (Not today) (1/31/2025 10:50 AM)              Passed - Patient is age 6-64 years        Passed - Normal CBC on file in past 12 months     Recent Labs   Lab Test 04/14/25  1220   WBC 4.7   RBC 4.17   HGB 12.9   HCT 38.9                    Passed - Medication is active on med list and the sig matches. RN to manually verify dose and sig if red X/fail.     If the protocol passes (green check), you do not need to verify med dose and sig.    A prescription matches if they are the same clinical intention.    For Example: once daily and every morning are the  same.    The protocol can not identify upper and lower case letters as matching and will fail.     For Example: Take 1 tablet (50 mg) by mouth daily     TAKE 1 TABLET (50 MG) BY MOUTH DAILY    For all fails (red x), verify dose and sig.    If the refill does match what is on file, the RN can still proceed to approve the refill request.       If they do not match, route to the appropriate provider.             Passed - Normal GFR on file in past 12 months     Recent Labs   Lab Test 04/14/25  1220 02/17/23  1037 11/13/20  1133   GFRESTIMATED 69   < > 83   GFRESTBLACK  --   --  >90    < > = values in this interval not displayed.             Passed - Recent (12 mo) or future (90 days) visit within the authorizing provider's specialty     The patient must have completed an in-person or virtual visit within the past 12 months or has a future visit scheduled within the next 90 days with the authorizing provider s specialty.  Urgent care and e-visits do not qualify as an office visit for this protocol.          Passed - No active pregnancy on record        Passed - No positive pregnancy test in past 12 months

## 2025-05-05 ENCOUNTER — TELEPHONE (OUTPATIENT)
Dept: ENDOCRINOLOGY | Facility: CLINIC | Age: 55
End: 2025-05-05
Payer: COMMERCIAL

## 2025-05-05 DIAGNOSIS — M96.1 POSTLAMINECTOMY SYNDROME: ICD-10-CM

## 2025-05-05 DIAGNOSIS — M96.1 FAILED BACK SURGICAL SYNDROME: ICD-10-CM

## 2025-05-05 DIAGNOSIS — F41.9 ANXIETY: ICD-10-CM

## 2025-05-05 DIAGNOSIS — Q79.60 EHLERS-DANLOS SYNDROME: ICD-10-CM

## 2025-05-05 RX ORDER — CYCLOBENZAPRINE HCL 10 MG
10 TABLET ORAL 3 TIMES DAILY PRN
Qty: 60 TABLET | Refills: 0 | Status: SHIPPED | OUTPATIENT
Start: 2025-05-05

## 2025-05-05 RX ORDER — METHOCARBAMOL 500 MG/1
500 TABLET, FILM COATED ORAL 4 TIMES DAILY
Qty: 30 TABLET | Refills: 0 | Status: SHIPPED | OUTPATIENT
Start: 2025-05-05

## 2025-05-05 NOTE — TELEPHONE ENCOUNTER
Refill request    Medication/Sig:   methocarbamol (ROBAXIN) 500 MG tablet-TAKE ONE TABLET BY MOUTH FOUR TIMES A DAY     cyclobenzaprine (FLEXERIL) 10 MG tablet-TAKE ONE TABLET BY MOUTH THREE TIMES A DAY AS NEEDED FOR MUSCLE SPASMS     Dispensed/Refills:   methocarbamol-30/0  Cyclobenzaprine-60/0    Last prescribed to patient: both-4/7/25     Last clinic appointment: 4/30/25  Next clinic appointment: none listed    Preferred pharmacy:    [facility] [address] [place] [address]    Refill request routed to the provider to review.

## 2025-05-05 NOTE — TELEPHONE ENCOUNTER
Prior Authorization Retail Medication Request    Medication/Dose: Wegovy    Diagnosis and ICD code (if different than what is on RX):    New/renewal/insurance change PA/secondary ins. PA:  Previously Tried and Failed:    Rationale:      Insurance   [place] [employer] [place]  Insurance ID:  [id number]  [phone]      Thank You,  Anne Garcia, [facility], [place]

## 2025-05-05 NOTE — PROGRESS NOTES
[facility]                                     Progress Note    Patient Name: Ada Welch  Date: 5/1/25         Service Type: Individual      Session Start Time: 1000   Session End Time: 1100     Session Length: 60    Session #: 1    Attendees: Client attended alone    Service Modality:  Video Visit:      Provider verified identity through the following two step process.  Patient provided:  Patient is known previously to provider    Telemedicine Visit: The patient's condition can be safely assessed and treated via synchronous audio and visual telemedicine encounter.      Reason for Telemedicine Visit: Patient has requested telehealth visit and Services only offered telehealth    Originating Site (Patient Location): Patient's home    Distant Site (Provider Location): Provider Remote Setting- Home Office    Consent:  The patient/guardian has verbally consented to: the potential risks and benefits of telemedicine (video visit) versus in person care; bill my insurance or make self-payment for services provided; and responsibility for payment of non-covered services.     Patient would like the video invitation sent by:  My Chart    Mode of Communication:  Video Conference via [employer]    Distant Location (Provider):  Off-site    As the provider I attest to compliance with applicable laws and regulations related to telemedicine.    DATA  Extended Session (53+ minutes):   - High distress and under complex circumstances.  See Data section for details  Interactive Complexity: No  Crisis: No         Progress Since Last Session (Related to Symptoms / Goals / Homework):   Symptoms: No change since diagnostic assessment    Homework:  Guided meditations/sleep hygiene/self-care skills      Episode of Care Goals: Satisfactory progress - ACTION (Actively working towards change); Intervened by reinforcing change plan / affirming steps taken     Current / Ongoing Stressors and Concerns:   Processed stressors  since last visit and the frustration with pain levels and feeling unheard by providers.  Began discussing short-term and long-term treatment plan goals.  Patient considering starting a podcast/youtube to help other patients with EDS.  Processed the long journey she has taken with her health and the impact of the pain on all areas of her life.  Patient processed martial hx and relationship w/ ex-.  Patient has trauma related to medical and relationship hx will continue to assess for PTSD diagnosis.  Processed how to self-care and set healthy boundaries.  Gave patient resources on chronic pain resources.      Treatment Objective(s) Addressed in This Session:   follow up with recommended medical treatments and appointments with Pain Center provider  Practice self-care, consider referral recommendations     Intervention:   CBT: cognitive restructuring, treatment plan setting, self-care    Assessments completed prior to visit:  The following assessments were completed by patient for this visit:  PHQ9:       8/15/2017     7:36 AM 9/21/2017     4:59 PM 10/23/2019     8:01 AM 1/3/2020     8:38 AM 11/13/2020    11:38 AM 10/31/2024    10:41 AM 4/8/2025     3:52 PM   PHQ-9 SCORE   PHQ-9 Total Score MyChart 0 0  2 (Minimal depression)   9 (Mild depression)   PHQ-9 Total Score 0 0 0 2 8 8 9        Patient-reported     GAD7:       10/31/2019     8:48 AM 1/22/2020     2:47 PM 11/13/2020    11:38 AM 8/5/2021     6:53 AM 7/12/2022     9:03 AM 10/31/2024    10:41 AM 4/8/2025     3:53 PM   SAMANTHA-7 SCORE   Total Score 0 (minimal anxiety) 1 (minimal anxiety)  0 (minimal anxiety) 1 (minimal anxiety)  1 (minimal anxiety)   Total Score 0 1 0 0 1 2 1        Patient-reported     PROMIS 10-Global Health (only subscores and total score):       8/29/2016     1:38 AM 9/2/2016     2:52 PM 4/26/2018    10:46 AM 7/10/2018     8:45 PM 9/20/2022    10:03 PM 4/8/2025     4:41 PM   PROMIS-10 Scores Only   Global Mental Health Score   11 13 15 13     Global Physical Health Score  8 7 6 8 9    PROMIS TOTAL - SUBSCORES   18 19 23 22    Global Physical Health Score : Raw Score 5 (SUM : G03 - G06 - G07 - G08)        Global Mental Health Score : Raw Score 9 (SUM : G02 - G04 - G05 - G10)        Total (Physical + Mental Health Score) 14            Patient-reported     [place] Suicide Severity Rating Scale (Lifetime/Recent)      12/17/2018     9:02 AM 3/3/2019     5:56 PM 6/23/2024     6:34 PM 7/2/2024    10:26 AM 4/9/2025     4:00 PM   Celina Suicide Severity Rating (Lifetime/Recent)   Q1 Wish to be Dead (Lifetime)     No   Q2 Non-Specific Active Suicidal Thoughts (Lifetime)     No   Q1 Wished to be Dead (Past Month) no no 0-->no 0-->no 0-->no   Q2 Suicidal Thoughts (Past Month) no no 0-->no 0-->no 0-->no   Q6 Suicide Behavior (Lifetime) no no 0-->no 0-->no 0-->no   Level of Risk per Screen   no risks indicated no risks indicated no risks indicated         ASSESSMENT: Current Emotional / Mental Status (status of significant symptoms):   Risk status (Self / Other harm or suicidal ideation)   Patient denies current fears or concerns for personal safety.   Patient denies current or recent suicidal ideation or behaviors.   Patient denies current or recent homicidal ideation or behaviors.   Patient denies current or recent self injurious behavior or ideation.   Patient denies other safety concerns.   Patient reports there has been no change in risk factors since their last session.     Patient reports there has been no change in protective factors since their last session.     Recommended that patient call 911 or go to the local ED should there be a change in any of these risk factors     Appearance:   Appropriate    Eye Contact:   Good    Psychomotor Behavior: Normal    Attitude:   Cooperative    Orientation:   All   Speech    Rate / Production: Normal     Volume:  Normal    Mood:    Depressed    Affect:    Appropriate  Tearful   Thought Content:  Clear    Thought  Form:  Coherent  Logical    Insight:    Good      Medication Review:   No changes to current psychiatric medication(s)     Medication Compliance:   Yes     Changes in Health Issues:   None reported     Chemical Use Review:   Substance Use: Chemical use reviewed, no active concerns identified      Tobacco Use: No current tobacco use.      Diagnosis:  1. Adjustment disorder with mixed anxiety and depressed mood    2. Chronic pain syndrome      Rule out- PTSD    Collateral Reports Completed:   Not Applicable    PLAN: (Patient Tasks / Therapist Tasks / Other)  Finish treatment plan at next session  Consider resources as discussed  Practice self-care and pain management strategies as discussed  Follow up with Arlyn in 2-4 weeks        JUAN Barbosa, LADC

## 2025-05-06 ENCOUNTER — VIRTUAL VISIT (OUTPATIENT)
Dept: INTERNAL MEDICINE | Facility: CLINIC | Age: 55
End: 2025-05-06
Payer: COMMERCIAL

## 2025-05-06 DIAGNOSIS — G43.719 INTRACTABLE CHRONIC MIGRAINE WITHOUT AURA AND WITHOUT STATUS MIGRAINOSUS: ICD-10-CM

## 2025-05-06 DIAGNOSIS — N95.1 MENOPAUSAL SYNDROME (HOT FLASHES): ICD-10-CM

## 2025-05-06 DIAGNOSIS — R51.9 CHRONIC INTRACTABLE HEADACHE, UNSPECIFIED HEADACHE TYPE: Primary | ICD-10-CM

## 2025-05-06 DIAGNOSIS — G89.29 CHRONIC INTRACTABLE HEADACHE, UNSPECIFIED HEADACHE TYPE: Primary | ICD-10-CM

## 2025-05-06 DIAGNOSIS — G89.29 OTHER CHRONIC PAIN: ICD-10-CM

## 2025-05-06 DIAGNOSIS — M47.812 CERVICAL FACET JOINT SYNDROME: ICD-10-CM

## 2025-05-06 PROCEDURE — 98007 SYNCH AUDIO-VIDEO EST HI 40: CPT | Performed by: INTERNAL MEDICINE

## 2025-05-06 PROCEDURE — 1125F AMNT PAIN NOTED PAIN PRSNT: CPT | Performed by: INTERNAL MEDICINE

## 2025-05-06 RX ORDER — RIZATRIPTAN BENZOATE 5 MG/1
TABLET, ORALLY DISINTEGRATING ORAL
Qty: 18 TABLET | Refills: 11 | Status: SHIPPED | OUTPATIENT
Start: 2025-05-06

## 2025-05-06 RX ORDER — ESTRADIOL 0.03 MG/D
1 FILM, EXTENDED RELEASE TRANSDERMAL
Qty: 24 PATCH | Refills: 1 | Status: SHIPPED | OUTPATIENT
Start: 2025-05-08

## 2025-05-06 RX ORDER — PROGESTERONE 100 MG/1
100 CAPSULE ORAL DAILY
Qty: 90 CAPSULE | Refills: 4 | Status: SHIPPED | OUTPATIENT
Start: 2025-05-06

## 2025-05-06 RX ORDER — HYDROXYZINE HYDROCHLORIDE 25 MG/1
25 TABLET, FILM COATED ORAL EVERY 6 HOURS PRN
Qty: 180 TABLET | Refills: 1 | Status: SHIPPED | OUTPATIENT
Start: 2025-05-06

## 2025-05-06 RX ORDER — PHENTERMINE HYDROCHLORIDE 15 MG/1
15 CAPSULE ORAL EVERY MORNING
Qty: 14 CAPSULE | Refills: 0 | Status: SHIPPED | OUTPATIENT
Start: 2025-05-06

## 2025-05-06 NOTE — PATIENT INSTRUCTIONS
Thank you for visiting the Primary Care Center today at the [facility]! The following is some information about our clinic:     Primary Care Center Frequently-Asked Questions    (1) How do I schedule appointments at the [facility]?     Primary Care--to schedule or make changes to an existing appointment, please call our primary care line at [phone].    Labs--to schedule a lab appointment at the [facility] you can use [employer] or call [phone]. If you have a [facility] location that is closer to home, you can reach out to that location for scheduling options.     Imaging--if you need to schedule a CT, X-ray, MRI, ultrasound, or other imaging study you can call [phone] to schedule at the [facility] or any other [facility] imaging location.     Referrals--if a referral to another specialty was ordered you can expect a phone call from their scheduling team. If you have not heard from them in a week, please call us or send us a [employer] message to check the status or get a scheduling number. Please note that this only applies to internal [facility] referrals. If the referral is external you would need to contact their office for scheduling.     (2) I have a question about my visit, who do I contact?     You can call us at the primary care line at [phone] to ask questions about your visit. You can also send a secure message through [employer], which is reviewed by clinic staff. Please note that [employer] messages have a twenty-four to forty-eight business hour turnaround time and should not be used for urgent concerns.    (3) How will I get the results of my tests?    If you are signed up for [employer] all tests will be released to you within twenty-four hours of resulting. Please allow three to five days for your doctor to review your results and place a note interpreting the results. If you do not have [employer] you will receive your  results through mail seven to ten business days following the return of the tests. Please note that if there should be any urgent or concerning results that your doctor or their registered nurse will reach out to you the same day as the tests come back. If you have follow up questions about your results or would like to discuss the results in detail please schedule a follow up with your provider either in person or virtually.     (4) How do I get refills of my prescriptions?     You should always first contact your pharmacy for refills of your medications. If submitting a refill request on "[employer] please be sure to submit the request only once--repeat requests can cause delays in refill. If you are requesting a NEW medication or a medication related to new symptoms you will need to schedule an appointment with a provider prior to approval. Please note: Routine medication refills have up to one to three business day turnaround whereas controlled substances refills have up to five to seven business day turnaround.    (5) I have new symptoms, what do I do?     If you are having an immediate medical emergency, you should dial 911 for assistance.   For anything urgent that needs to be seen within a few hours to one day you should visit a local urgent care for assistance.  For non-urgent symptoms that need to be seen within a few days to a week you can schedule with an available provider in primary care by going to [employer] or calling [phone].   If you are not sure how serious your symptoms are or you would like to receive medical advice you can always call [phone] to speak with a triage nurse.

## 2025-05-06 NOTE — TELEPHONE ENCOUNTER
Last Written Prescription:  hydrOXYzine HCl (ATARAX) 25 MG tablet 180 tablet 1 11/8/2024 -- No   Sig - Route: Take 1 tablet (25 mg) by mouth every 6 hours as needed for anxiety. - Oral     ----------------------  Last Visit Date:     5/6/2025  [facility] [place]      Future Visit Date: 0  ----------------------      Refill decision: Medication refilled per  Medication Refill in Ambulatory Care  policy.          Request from pharmacy:  Requested Prescriptions   Pending Prescriptions Disp Refills    hydrOXYzine HCl (ATARAX) 25 MG tablet [Pharmacy Med Name: hydrOXYzine HCL 25MG TAB] 180 tablet 1     Sig: TAKE 1 TABLET (25 MG) BY MOUTH EVERY 6 HOURS AS NEEDED FOR ANXIETY.       Antihistamines Protocol Passed - 5/6/2025 12:23 PM        Passed - Patient is age 3 or older     Apply age and/or weight-based dosing for peds patients age 3 and older.    Forward request to provider for patients under the age of 3.          Passed - Medication is active on med list and the sig matches. RN to manually verify dose and sig if red X/fail.     If the protocol passes (green check), you do not need to verify med dose and sig.    A prescription matches if they are the same clinical intention.    For Example: once daily and every morning are the same.    The protocol can not identify upper and lower case letters as matching and will fail.     For Example: Take 1 tablet (50 mg) by mouth daily     TAKE 1 TABLET (50 MG) BY MOUTH DAILY    For all fails (red x), verify dose and sig.    If the refill does match what is on file, the RN can still proceed to approve the refill request.       If they do not match, route to the appropriate provider.             Passed - Recent (12 month) or future (90 days) visit with authorizing provider s specialty     The patient must have completed an in-person or virtual visit within the past 12 months or has a future visit scheduled within the next 90 days with the  authorizing provider s specialty.  Urgent care and e-visits do not qualify as an office visit for this protocol.          Passed - Medication indicated for associated diagnosis     The medication is associated with one or more of the following diagnoses:  Allergies  Rhinitis  Upper respiratory tract allergy  Urticaria  Itching  Cystic Fibrosis  Bronchiectasis

## 2025-05-06 NOTE — TELEPHONE ENCOUNTER
RX Authorization    Medication: rizatriptan (MAXALT-MLT) 5 MG ODT     Date last refill ordered: 02/17/2024    Quantity ordered: 18     # refills: 11    Date of last clinic visit with ordering provider: 05/21/2024    Date of next clinic visit with ordering provider: 08/25/2025

## 2025-05-07 ENCOUNTER — MYC MEDICAL ADVICE (OUTPATIENT)
Dept: INTERNAL MEDICINE | Facility: CLINIC | Age: 55
End: 2025-05-07
Payer: COMMERCIAL

## 2025-05-07 NOTE — TELEPHONE ENCOUNTER
Left Voicemail (1st Attempt) and Sent Mychart (1st Attempt) for the patient to call back and schedule the following:    Appointment type: Video Return   Provider: PCP  Return date: around 5/27/2025  Specialty phone number: [phone]  Additonal Notes: per check out - Return in about 3 weeks (around 5/27/2025) for with me, using a video visit. Dr. Rivera

## 2025-05-07 NOTE — TELEPHONE ENCOUNTER
PRIOR AUTHORIZATION DENIED    Medication: WEGOVY 1 MG/0.5ML SC SOAJ    Insurance Company: [employer] [place] - Phone [phone] Fax [phone]    Denial Date: 5/7/2025    Denial Reason(s): Plan would want patient to move up to the maintenance dose of either 1.7mg or 2.4mg.  If provider would like the patient to stay on the 1mg, please provide a letter of medical necessity.

## 2025-05-14 NOTE — TELEPHONE ENCOUNTER
[facility] UGI FOCUS NOTE      Date:  5/14/2025  Time:  2:47 PM    Patient:  Haydee IqbalCliftonSergeduysai  Procedure:  UGI      Patient Questions  Lap Band Adjustment Patient Questionnaire:  If female, are you pregnant? []Yes     [x]No  Tolerates thick liquids:  []Well   []1     []2     []3     []4     []5     [x]Poorly  Tolerates red meat:  []Well   []1     []2     [x]3     []4     []5     [] Poorly  Tolerates chicken:  [x]Well   []1     []2     []3     []4     []5     []Poorly  Tolerates fish:   [x]Well   []1     []2     []3     []4     []5     []Poorly  Hunger is:   []Well Controlled     []1     []2     [x]3     []4     []5      [] Poorly Controlled  Nightime regurgitation:  []Never     []1     [x]2     []3     []4     []5     []Frequently    Lap Band Info:  Band Type  []Realize     []Realize-C     []AP     []VG     []10cm     []Unknown  []Other      Comments:        Traci Justice RN   [facility] Call Center    Phone Message    May a detailed message be left on voicemail: no     Reason for Call: Medication Question or concern regarding medication   Prescription Clarification  Name of Medication: ivabradine (CORLANOR) 7.5 MG tablet  Prescribing Provider: Dr. Russell   Pharmacy:[facility] [id number] [place] [address]   What on the order needs clarification? Jolanta called stating the medication needs a prior authorization.  Jolanta reports Pt has been out of the medication for a week.  Jolanta said to call to get the provider line if the fax is needed.  Please send prior authorization to [facility].            Action Taken: Message routed to:  [facility]  Cardiology    Travel Screening: Not Applicable

## 2025-05-15 ENCOUNTER — VIRTUAL VISIT (OUTPATIENT)
Dept: PALLIATIVE MEDICINE | Facility: OTHER | Age: 55
End: 2025-05-15
Payer: COMMERCIAL

## 2025-05-15 DIAGNOSIS — F43.23 ADJUSTMENT DISORDER WITH MIXED ANXIETY AND DEPRESSED MOOD: Primary | ICD-10-CM

## 2025-05-15 DIAGNOSIS — G89.4 CHRONIC PAIN SYNDROME: ICD-10-CM

## 2025-05-15 PROCEDURE — 90834 PSYTX W PT 45 MINUTES: CPT | Mod: 95 | Performed by: SOCIAL WORKER

## 2025-05-17 ENCOUNTER — HEALTH MAINTENANCE LETTER (OUTPATIENT)
Age: 55
End: 2025-05-17

## 2025-05-18 NOTE — PROGRESS NOTES
[facility]                                    Progress Note    Patient Name: Ada Welch  Date:  5/15/25         Service Type: Individual      Session Start Time:  1015  Session End Time: 1055     Session Length:  50    Session #:  2    Attendees: Client attended alone    Service Modality:  Video Visit:      Provider verified identity through the following two step process.  Patient provided:  Patient is known previously to provider    Telemedicine Visit: The patient's condition can be safely assessed and treated via synchronous audio and visual telemedicine encounter.      Reason for Telemedicine Visit: Patient has requested telehealth visit and Services only offered telehealth    Originating Site (Patient Location): Patient's other daughters house in [place]    Distant Site (Provider Location): Provider Remote Setting- Home Office    Consent:  The patient/guardian has verbally consented to: the potential risks and benefits of telemedicine (video visit) versus in person care; bill my insurance or make self-payment for services provided; and responsibility for payment of non-covered services.     Patient would like the video invitation sent by:  My Chart    Mode of Communication:  Video Conference via [employer]    Distant Location (Provider):  Off-site    As the provider I attest to compliance with applicable laws and regulations related to telemedicine.    DATA  Extended Session (53+ minutes): No  Interactive Complexity: No  Crisis: No         Progress Since Last Session (Related to Symptoms / Goals / Homework):   Symptoms: Worsening of physical health symptoms that have patient very distressed and difficulty functioning    Homework: Did not complete      Episode of Care Goals: Minimal progress - PREPARATION (Decided to change - considering how); Intervened by negotiating a change plan and determining options / strategies for behavior change, identifying triggers, exploring social  supports, and working towards setting a date to begin behavior change     Current / Ongoing Stressors and Concerns:   Patient was late to appointment reporting that she has been really struggling with an extreme headache, that was no within her normal physical symptoms and some facial changes.  Patient was waiting for ex- to get back into town to bring her to ER.  Discussed her last visit to ER and how she was treated and reported that she didn't want to be there without someone to advocate for her.  She was at her adult daughters home, stated she could not bring her.  Encouraged patient to get to ER as soon as possible as symptoms seemed concerning.  Adult daughter is aware of symptoms which have been present for the last day or more.  Discussed patients struggle to manage pain levels and self-care.  Processed options and self-care, communication and advocacy strategies. She did make an appointment with Dr. Coronado which is scheduled for next week. Was not able to complete treatment plan today as patient was having a hard time focusing throughout session because of the pain levels she was experiencing.     Treatment Objective(s) Addressed in This Session:   follow up with recommended medical treatments and appointments with Pain Center provider  Practice self-care, consider referral recommendations     Intervention:   CBT: cognitive restructuring, treatment plan setting, self-care    Assessments completed prior to visit:  The following assessments were completed by patient for this visit:  PHQ9:       8/15/2017     7:36 AM 9/21/2017     4:59 PM 10/23/2019     8:01 AM 1/3/2020     8:38 AM 11/13/2020    11:38 AM 10/31/2024    10:41 AM 4/8/2025     3:52 PM   PHQ-9 SCORE   PHQ-9 Total Score MyChart 0 0  2 (Minimal depression)   9 (Mild depression)   PHQ-9 Total Score 0  0  0 2 8 8 9        Patient-reported    Data saved with a previous flowsheet row definition     GAD7:       10/31/2019     8:48 AM 1/22/2020      2:47 PM 11/13/2020    11:38 AM 8/5/2021     6:53 AM 7/12/2022     9:03 AM 10/31/2024    10:41 AM 4/8/2025     3:53 PM   SAMANTHA-7 SCORE   Total Score 0 (minimal anxiety) 1 (minimal anxiety)  0 (minimal anxiety) 1 (minimal anxiety)  1 (minimal anxiety)   Total Score 0 1 0 0 1 2 1        Patient-reported     PROMIS 10-Global Health (only subscores and total score):       8/29/2016     1:38 AM 9/2/2016     2:52 PM 4/26/2018    10:46 AM 7/10/2018     8:45 PM 9/20/2022    10:03 PM 4/8/2025     4:41 PM   PROMIS-10 Scores Only   Global Mental Health Score   11 13 15 13    Global Physical Health Score  8  7 6 8 9    PROMIS TOTAL - SUBSCORES   18 19 23 22    Global Physical Health Score : Raw Score 5 (SUM : G03 - G06 - G07 - G08)        Global Mental Health Score : Raw Score 9 (SUM : G02 - G04 - G05 - G10)        Total (Physical + Mental Health Score) 14            Patient-reported    Data saved with a previous flowsheet row definition     [place] Suicide Severity Rating Scale (Lifetime/Recent)      12/17/2018     9:02 AM 3/3/2019     5:56 PM 6/23/2024     6:34 PM 7/2/2024    10:26 AM 4/9/2025     4:00 PM   [place] Suicide Severity Rating (Lifetime/Recent)   Q1 Wish to be Dead (Lifetime)     No   Q2 Non-Specific Active Suicidal Thoughts (Lifetime)     No   Q1 Wished to be Dead (Past Month) no  no  0-->no 0-->no 0-->no   Q2 Suicidal Thoughts (Past Month) no  no  0-->no 0-->no 0-->no   Q6 Suicide Behavior (Lifetime) no  no  0-->no 0-->no 0-->no   Level of Risk per Screen   no risks indicated no risks indicated no risks indicated       Data saved with a previous flowsheet row definition         ASSESSMENT: Current Emotional / Mental Status (status of significant symptoms):   Risk status (Self / Other harm or suicidal ideation)   Patient denies current fears or concerns for personal safety.   Patient denies current or recent suicidal ideation or behaviors.   Patient denies current or recent homicidal ideation or  behaviors.   Patient denies current or recent self injurious behavior or ideation.   Patient denies other safety concerns.   Patient reports there has been no change in risk factors since their last session.     Patient reports there has been no change in protective factors since their last session.     Recommended that patient call 911 or go to the local ED should there be a change in any of these risk factors     Appearance:   Appropriate    Eye Contact:   Good    Psychomotor Behavior: Normal    Attitude:   Cooperative    Orientation:   All   Speech    Rate / Production: Normal     Volume:  Normal    Mood:    Depressed    Affect:    Appropriate  Tearful   Thought Content:  Clear    Thought Form:  Coherent  Logical    Insight:    Good      Medication Review:   No changes to current psychiatric medication(s)     Medication Compliance:   Yes     Changes in Health Issues:   None reported     Chemical Use Review:   Substance Use: Chemical use reviewed, no active concerns identified      Tobacco Use: No current tobacco use.      Diagnosis:  1. Adjustment disorder with mixed anxiety and depressed mood    2. Chronic pain syndrome      Rule out- PTSD    Collateral Reports Completed:   Not Applicable    PLAN: (Patient Tasks / Therapist Tasks / Other)  Finish treatment plan at next session  Consider resources as discussed  Practice self-care and pain management strategies as discussed  Follow up with Arlyn in 2-4 weeks  Follow up with ER ASAP  Follow up with DR. Coronado as scheduled      Padmini Gomez, JUAN, [facility]

## 2025-05-20 NOTE — PROGRESS NOTES
Medication Therapy Management (MTM) Encounter    ASSESSMENT:                            Medication Adherence/Access: No issues identified.    Headache   (Migraine)  Given the number of severe migraine days patient is experiencing she may benefit from additional preventative treatment. Could consider a CGRP inhibitor even though this has been expensive in the past but could see if there is any patient assistance through the drug  or if more affordable with 2025 Medicare changes. Others to consider include nortriptyline, venlafaxine (would need to stop duloxetine), verapamil, or atenolol (switching from propranolol and would need to discuss with cardiology). Her acute treatment regimen seems to be appropriate and effective at this time.     Pain   Reviewed that acetaminophen, opioids, and NSAID's can contribute to medication overuse headaches. Patient does take celecoxib daily along with as needed naproxen and reviewed risk of taking multiple NSAIDs together. Recommend patient continue to take NSAIDs with food. She will be reducing her topiramate use and switching her current muscle relaxants (cyclobenzaprine and methocarbamol) to tizanidine once she picks up from pharmacy.     PLAN:                            Continue with as needed use of sumatriptan, rizatriptan, Ubrelvy, and metoclopramide   Follow pain providers recommendation for topiramate dose reduction and starting tizanidine to replace cyclobenzaprine   Preventative medications that could be considered: CGRP inhibitor (we could see if there is patient assistance available or see if more affordable with Medicare 2025 changes), nortriptyline, atenolol (would replace propranolol and need to discuss with cardiology), venlafaxine (would replace duloxetine), and/or verapamil   Please make sure you take celecoxib and naproxen with food, these can be very hard on the stomach   Just as an FYI - acetaminophen, NSAIDs, and opioids can lead to medication  overuse headaches.     Follow-up: as needed based on medication questions and concerns.     SUBJECTIVE/OBJECTIVE:                          Ada Welch is a 54 year old female seen for an initial visit. She was referred to me from Dr. Nguyen.      Reason for visit: [facility] .    Allergies/ADRs: Reviewed in chart  Past Medical History: Reviewed in chart  Tobacco: She reports that she quit smoking about 11 years ago. Her smoking use included cigarettes. She started smoking about 34 years ago. She has a 4.6 pack-year smoking history. She has been exposed to tobacco smoke. She has never used smokeless tobacco.  Alcohol: not currently using  THC/CBD: Medical cannabis - uses for pain and sleep, takes orally.   Medication Adherence/Access: no issues reported.      Headache   (Migraine)  Preventive medications  Botox every 3 months - last given 3/25/25, sometimes is helpful but last dose was not   Topiramate 100 mg daily - does help with sleep but will be reducing down to 50mg after pain follow-up this morning. Possibly having side effects of memory fog Unsure if helpful to prevent headaches   Propranolol  mg daily (taking for autonomic dysfunction)    Acute medications  Sumatriptan 6 mg subcutaneously at migraine onset- saved for a last resort rescue medication and will usually take once weekly   Rizatriptan 5-10 mg at migraine onset - three times weekly   Ubrelvy 100 mg at migraine onset - three times weekly   Metoclopramide 10 mg 4 times daily as needed - for nausea but doesn't notice if helpful for migraines     Will either take rizatriptan or ubrelvy, does not take on the same day     Will be starting the tizanidine soon for pain and headaches    She had Botox 6 weeks ago and then underwent a MRI the next day. Since these two events she has had a daily migraine and can barely function. She wants to get back to playing with her grand kids     Associated symptoms include: Vision distortion, nausea,  photophobia     Past medications tried/failed: Amitriptyline (caused weight gain), zonisamide (unsure why it didn't work), gabapentin and Lyrica (decreased her BP and HR), depakote (used for back pain and unsure if helpful), DHE (worked but infusions were a lot and made her throw up), Fioricet (worked)     CGRPs have not been affordable in the past  - Emgality and Aimovig were prescribed but could not afford so never started     Last year and a half have been the worst and pain is 9.5/10 if she is active   Laying down helps reduce to 7/10, will need to lay down for many hours   Always been at 6-7/10 for pain   If she is standing up right for a long time during the day she is unable to move legs at the end of the day     She experiences 30/30 headache days per month and 30/30 are severe headaches     Associated symptoms: nausea, vomiting, photophobia, and phonophobia     Nonpharmacologic: She is will try Ice caps and hot compresses, Water with lots of electrolytes, meditation, Essential oils, TENS unit, Epson salt bath        Pain   Acetaminophen 1000 mg every 6 hours as needed - usually takes twice daily   Celecoxib 100 mg daily as needed -takes daily   Cyclobenzaprine 10 mg 3 times daily as needed - will be stopping per pain provider and patient is suppose to switch to tizanidine   Methocarbamol 500 mg 4 times daily - takes at night - May be stopping this medication after visit this morning?  Naproxen 250mg twice daily as needed  - takes a few times a week, does try to take with food   Duloxetine 90 mg daily  Butrans patch 10 mcg/hr weekly  Hydromorphone 2 mg daily as needed - takes every 3-4 months, only for severe pain   Tramadol 50 mg twice daily as needed - takes twice daily, sometimes during a weather change will take a third dose in the afternoon   Narcan nasal spray as needed - has on hand, hasn't needed to use  Lidocaine 4% 1-3 patches daily as needed  Patient follows with pain clinic and had a visit this  morning.   Tizanidine was added today in place of other muscle relaxants. She hasn't picked up from the pharmacy yet.          Unable to perform a full medication review due to time constraints       Today's Vitals: LMP  (LMP Unknown)   ----------------      I spent 25 minutes with this patient today. I offer these suggestions for consideration by Dr. Nguyen.     A summary of these recommendations was sent via [employer].    Lisbeth Blake, PharmD, BCACP  Medication Therapy Management Pharmacist   [facility]      Telemedicine Visit Details  The patient's medications can be safely assessed via a telemedicine encounter.  Type of service:  Video Conference via [employer]  Originating Location (pt. Location): Home  Distant Location (provider location):  Off-site  Start Time: 4:03 PM  End Time: 4:28 PM     Medication Therapy Recommendations  No medication therapy recommendations to display

## 2025-05-20 NOTE — TELEPHONE ENCOUNTER
Please also do PA to secondary ins [facility] part d nvt id [id number]  Thank you   Shilpa Dillon [facility]  [phone]

## 2025-05-21 ENCOUNTER — VIRTUAL VISIT (OUTPATIENT)
Dept: PHARMACY | Facility: CLINIC | Age: 55
End: 2025-05-21
Attending: PHARMACIST
Payer: COMMERCIAL

## 2025-05-21 ENCOUNTER — VIRTUAL VISIT (OUTPATIENT)
Dept: NEUROLOGY | Facility: CLINIC | Age: 55
End: 2025-05-21
Payer: COMMERCIAL

## 2025-05-21 ENCOUNTER — OFFICE VISIT (OUTPATIENT)
Dept: PALLIATIVE MEDICINE | Facility: OTHER | Age: 55
End: 2025-05-21
Attending: INTERNAL MEDICINE
Payer: COMMERCIAL

## 2025-05-21 ENCOUNTER — TELEPHONE (OUTPATIENT)
Dept: ENDOCRINOLOGY | Facility: CLINIC | Age: 55
End: 2025-05-21

## 2025-05-21 ENCOUNTER — VIRTUAL VISIT (OUTPATIENT)
Dept: PHYSICAL MEDICINE AND REHAB | Facility: CLINIC | Age: 55
End: 2025-05-21
Payer: COMMERCIAL

## 2025-05-21 VITALS — HEART RATE: 81 BPM | OXYGEN SATURATION: 100 % | DIASTOLIC BLOOD PRESSURE: 77 MMHG | SYSTOLIC BLOOD PRESSURE: 108 MMHG

## 2025-05-21 DIAGNOSIS — G44.86 CERVICOGENIC HEADACHE: Primary | ICD-10-CM

## 2025-05-21 DIAGNOSIS — R52 PAIN: ICD-10-CM

## 2025-05-21 DIAGNOSIS — R51.9 CHRONIC INTRACTABLE HEADACHE, UNSPECIFIED HEADACHE TYPE: Primary | ICD-10-CM

## 2025-05-21 DIAGNOSIS — G43.719 INTRACTABLE CHRONIC MIGRAINE WITHOUT AURA AND WITHOUT STATUS MIGRAINOSUS: Primary | ICD-10-CM

## 2025-05-21 DIAGNOSIS — G89.29 OTHER CHRONIC PAIN: ICD-10-CM

## 2025-05-21 DIAGNOSIS — M47.812 CERVICAL FACET JOINT SYNDROME: ICD-10-CM

## 2025-05-21 DIAGNOSIS — G89.29 CHRONIC INTRACTABLE HEADACHE, UNSPECIFIED HEADACHE TYPE: Primary | ICD-10-CM

## 2025-05-21 DIAGNOSIS — G43.719 CHRONIC MIGRAINE WITHOUT AURA, INTRACTABLE, WITHOUT STATUS MIGRAINOSUS: Primary | ICD-10-CM

## 2025-05-21 PROCEDURE — 3078F DIAST BP <80 MM HG: CPT | Performed by: ANESTHESIOLOGY

## 2025-05-21 PROCEDURE — G0463 HOSPITAL OUTPT CLINIC VISIT: HCPCS | Performed by: ANESTHESIOLOGY

## 2025-05-21 PROCEDURE — 3074F SYST BP LT 130 MM HG: CPT | Performed by: ANESTHESIOLOGY

## 2025-05-21 PROCEDURE — 98001 SYNCH AUDIO-VIDEO NEW LOW 30: CPT | Performed by: PHYSICAL MEDICINE & REHABILITATION

## 2025-05-21 PROCEDURE — G2211 COMPLEX E/M VISIT ADD ON: HCPCS | Performed by: ANESTHESIOLOGY

## 2025-05-21 PROCEDURE — 99205 OFFICE O/P NEW HI 60 MIN: CPT | Performed by: ANESTHESIOLOGY

## 2025-05-21 RX ORDER — NAPROXEN 250 MG/1
250 TABLET ORAL 2 TIMES DAILY WITH MEALS
COMMUNITY

## 2025-05-21 ASSESSMENT — HEADACHE IMPACT TEST (HIT 6)
HOW OFTEN DO HEADACHES LIMIT YOUR DAILY ACTIVITIES: VERY OFTEN
WHEN YOU HAVE A HEADACHE HOW OFTEN DO YOU WISH YOU COULD LIE DOWN: VERY OFTEN
HIT6 TOTAL SCORE: 65
WHEN YOU HAVE HEADACHES HOW OFTEN IS THE PAIN SEVERE: VERY OFTEN
HOW OFTEN HAVE YOU FELT FED UP OR IRRITATED BECAUSE OF YOUR HEADACHES: SOMETIMES
HOW OFTEN DID HEADACHS LIMIT CONCENTRATION ON WORK OR DAILY ACTIVITY: VERY OFTEN
HOW OFTEN HAVE YOU FELT TOO TIRED TO WORK BECAUSE OF YOUR HEADACHES: VERY OFTEN

## 2025-05-21 ASSESSMENT — ANXIETY QUESTIONNAIRES
IF YOU CHECKED OFF ANY PROBLEMS ON THIS QUESTIONNAIRE, HOW DIFFICULT HAVE THESE PROBLEMS MADE IT FOR YOU TO DO YOUR WORK, TAKE CARE OF THINGS AT HOME, OR GET ALONG WITH OTHER PEOPLE: NOT DIFFICULT AT ALL
7. FEELING AFRAID AS IF SOMETHING AWFUL MIGHT HAPPEN: NOT AT ALL
6. BECOMING EASILY ANNOYED OR IRRITABLE: SEVERAL DAYS
4. TROUBLE RELAXING: NOT AT ALL
7. FEELING AFRAID AS IF SOMETHING AWFUL MIGHT HAPPEN: NOT AT ALL
7. FEELING AFRAID AS IF SOMETHING AWFUL MIGHT HAPPEN: NOT AT ALL
3. WORRYING TOO MUCH ABOUT DIFFERENT THINGS: NOT AT ALL
1. FEELING NERVOUS, ANXIOUS, OR ON EDGE: NOT AT ALL
GAD7 TOTAL SCORE: 1
6. BECOMING EASILY ANNOYED OR IRRITABLE: SEVERAL DAYS
3. WORRYING TOO MUCH ABOUT DIFFERENT THINGS: NOT AT ALL
2. NOT BEING ABLE TO STOP OR CONTROL WORRYING: NOT AT ALL
5. BEING SO RESTLESS THAT IT IS HARD TO SIT STILL: NOT AT ALL
4. TROUBLE RELAXING: NOT AT ALL
GAD7 TOTAL SCORE: 1
GAD7 TOTAL SCORE: 1
8. IF YOU CHECKED OFF ANY PROBLEMS, HOW DIFFICULT HAVE THESE MADE IT FOR YOU TO DO YOUR WORK, TAKE CARE OF THINGS AT HOME, OR GET ALONG WITH OTHER PEOPLE?: NOT DIFFICULT AT ALL
GAD7 TOTAL SCORE: 1
8. IF YOU CHECKED OFF ANY PROBLEMS, HOW DIFFICULT HAVE THESE MADE IT FOR YOU TO DO YOUR WORK, TAKE CARE OF THINGS AT HOME, OR GET ALONG WITH OTHER PEOPLE?: NOT DIFFICULT AT ALL
IF YOU CHECKED OFF ANY PROBLEMS ON THIS QUESTIONNAIRE, HOW DIFFICULT HAVE THESE PROBLEMS MADE IT FOR YOU TO DO YOUR WORK, TAKE CARE OF THINGS AT HOME, OR GET ALONG WITH OTHER PEOPLE: NOT DIFFICULT AT ALL
7. FEELING AFRAID AS IF SOMETHING AWFUL MIGHT HAPPEN: NOT AT ALL
5. BEING SO RESTLESS THAT IT IS HARD TO SIT STILL: NOT AT ALL
GAD7 TOTAL SCORE: 1
2. NOT BEING ABLE TO STOP OR CONTROL WORRYING: NOT AT ALL
1. FEELING NERVOUS, ANXIOUS, OR ON EDGE: NOT AT ALL
GAD7 TOTAL SCORE: 1

## 2025-05-21 ASSESSMENT — ACTIVITIES OF DAILY LIVING (ADL)
I_KEEP_THINKING_ABOUT_HOW_BADLY_I_WANT_THE_PAIN_TO_STOP: 2 = TO A MODERATE DEGREE
I_KEEP_THINKING_ABOUT_HOW_BADLY_I_WANT_THE_PAIN_TO_STOP: 2 = TO A MODERATE DEGREE

## 2025-05-21 NOTE — NURSING NOTE
Current patient location: [address] [place] [id number]    Is the patient currently in the state of [place]? YES    Visit mode: VIDEO    If the visit is dropped, the patient can be reconnected by:TELEPHONE VISIT: Phone number:   Telephone Information:   Mobile [phone]       Will anyone else be joining the visit? NO  (If patient encounters technical issues they should call [phone]    Are changes needed to the allergy or medication list? Pt stated no med changes    Are refills needed on medications prescribed by this physician? NO    Rooming Documentation:  Questionnaire(s) completed    Reason for visit: Headache    Sherin ANNEF

## 2025-05-21 NOTE — TELEPHONE ENCOUNTER
PRIOR AUTHORIZATION DENIED    Medication: WEGOVY 0.5 MG/0.5ML [place] SOAJ INSURANCE 2 OF 2     Insurance Company: [employer] - Phone [phone] Fax [phone]    Denial Date: 5/21/2025    Denial Reason(s): Weight loss medications are excluded from Medicare    Appeal Information:

## 2025-05-21 NOTE — LETTER
5/21/2025       RE: Ada Welch  [address] [address]     Dear Colleague,    Thank you for referring your patient, Ada Welch, to the [facility] [place] [facility]. Please see a copy of my visit note below.    Virtual Visit Details    Type of service:  Video Visit   Video Start Time: 2:45 PM  Video End Time:3:17 PM    Originating Location (pt. Location): Home    Distant Location (provider location):  Off-site  Platform used for Video Visit: [employer]  Answers submitted by the patient for this visit:  Patient Health Questionnaire (G7) (Submitted on 5/21/2025)  SAMANTHA 7 TOTAL SCORE: 1    [facility]    Headache Neurology Multidisciplinary Clinic  May 21, 2025      Assessment/Plan:   Ada Welch is a 54 year old who presents to the multidisciplinary headache clinic for further evaluation. She has been followed by Dr. Nguyen and currently receives botulinum toxin injections, and takes propranolol, topiramate which is being tapered, and duloxetine.    Her headache presentation shows a long history of migraine, starting as episodic before transforming to chronic years ago.  I suspect she is predisposed to headache and migraine on a genetic basis.  This is complicated by previous sinus surgery, rhinoplasty, chronic neck and back pain, EDS, TMD, dysautonomia, MCAS, medication overuse, opioid use.     No obvious neurologic deficits on virtual visit. She has had head imaging previously, most recently MRI, MRV, and MRA of the brain in June 2024.     Today, reviewed her current and previous treatment strategies for chronic migraine, and discussed other multidisciplinary approaches to living with chronic pain:    Regarding preventative treatments, the following could be trialed:  CGRP inhibitor - Vyepti?  Nortriptyline  Venlafaxine      Verapamil      Atenolol      Depakote       With her  complicating features, physical therapy and mental health care will be important to optimizing her function.        I spent over 40 minutes on record review, patient care, documentation, and coordination of care today.    Michaela Mesa MD  Neurology     Subjective:    Ada Welch has been living with headaches since childhood, with migraine at age 30. These were intermittent for some time, changing around 2006/2007 to be more frequent and severe, including prolonged attack lasting months in 2009. She recalls sinus surgery in 2007, a nose surgery in 2010. She took DHE at home for treatment of some of these. Gradually worsened over time.    They have always started in the back of her head and radiate forward to either side of her head, left sided most recently (since Botox and MRI brain for a study 6 weeks ago she reports). Pressure builds at the back of her head, then feels like a dull brain freeze in the front, like her skull isn't big enough for her brain.     In last 1.5 years, cannot read and reports numbness/buzzing from jaw into lips, and symptoms of pain, numbness, weakness in arms, hands, legs.     Pain is very severe. Not functioning through pain.    Headache is worse if spends the day up and around. Headache calms down from 9.5/10 to 7/10 if she lays down for 6 hours, but doesn't go away. She's been at 6-7/10 or higher for the past 5-6 years.    30/30 headache days per month, with 30/30 severe headache days per month.    Associated with nausea, vomiting, photophobia, phonophobia.     For treatment, she takes:  Ice cap, hot compresses  Water with electrolytes  Tea  Maxalt  Imitrex injection  Ubrelvy  Meditation   Essential oils, peppermint, tea tree  TENS unit  White noise, music  Epson salt bath  Medical cannabis balm  Muscle relaxers - tizanidine most recently    No particular order to trying treatments. Depends on where pain is.  Maxalt and Imitrex for migraine help sometimes, but not  lately.  Ubrelvy has been helpful, but not lately.    For prevention, she takes:  Botox is helpful in the past  Propranolol 120 mg is helpful in the past  Tapering topiramate now, she explains  Duloxetine for nerve pain    Previously, amitriptyline with 50 pound weight gain  CGRP inhibitors are not affordable     Gabapentin, Lyrica with decrease in bp and hr  Depakote in the past for low back pain    Used to see a PT for pool therapy    PMH:  EDS  Dysautonomia  Chiari malformation  Craniocervical instability  Spondylosis  MCAS  TMD    Family hx:  Child with migraine  Sisters with migraine  Mother and sister with EDS  Father with headaches,  at age 35 from cancer    Objective:    Vitals: LMP  (LMP Unknown)   General: Cooperative, appears to be in distress  Neurologic:  Mental Status: Fully alert, attentive and oriented. Speech clear and fluent.   Cranial Nerves: Facial movements symmetric.   Motor: No abnormal movements.      Pertinent Investigations:    MRI , MRA, and MRV brain wo contrast (2024):  1. No evidence of acute intracranial abnormality.   2. Few foci of T2 prolongation in the supratentorial white matter nonspecific.   Differential considerations include sequela of migraine headaches, prior   inflammation and chronic small vessel disease.   3. Mild mucosal thickening in the maxillary sinuses with air-fluid level could   represent acute sinusitis. Mild mucosal thickening in the right frontal sinus   and anterior ethmoid air cells.     MRA and MRV were unremarkable.        10/8/2024    10:38 AM 2024     9:32 AM 2025     2:28 PM   HIT-6   When you have headaches, how often is the pain severe 11 13 11   How often do headaches limit your ability to do usual daily activities including household work, work, school, or social activities? 11 13 11   When you have a headache, how often do you wish you could lie down? 13 13 11   In the past 4 weeks, how often have you felt too tired to do work or  daily activities because of your headaches 11 13 11   In the past 4 weeks, how often have you felt fed up or irritated because of your headaches 11 13 10   In the past 4 weeks, how often did headaches limit your ability to concentrate on work or daily activities 11 13 11   HIT-6 Total Score 68 78  65        Patient-reported           5/21/2024     8:58 AM   MIDAS - in the past three months:   On how many days did you miss work or school because of your headaches? 0   How many days was your productivity at work or school reduced by half or more because of your headaches? 0   On how many days did you not do household work because of your headaches? 90   How many days was your productivity in household work reduced by half or more because of your headaches? 0   On how many days did you miss family, social, or leisure activities because of your headaches? 90   On how many days did you have a headache? 90   On a scale of 0-10, on average how painful were these headaches? 7   MIDAS Score 180 (IV - Severe Disability)              Again, thank you for allowing me to participate in the care of your patient.      Sincerely,    Michaela Mesa MD

## 2025-05-21 NOTE — PROGRESS NOTES
Virtual Visit Details    Type of service:  Video Visit   Video Start Time: 2:45 PM  Video End Time:3:17 PM    Originating Location (pt. Location): Home    Distant Location (provider location):  Off-site  Platform used for Video Visit: [employer]  Answers submitted by the patient for this visit:  Patient Health Questionnaire (G7) (Submitted on 5/21/2025)  SAMANTHA 7 TOTAL SCORE: 1    [facility]    Headache Neurology Multidisciplinary Clinic  May 21, 2025      Assessment/Plan:   Ada Welch is a 54 year old who presents to the multidisciplinary headache clinic for further evaluation. She has been followed by Dr. Nguyen and currently receives botulinum toxin injections, and takes propranolol, topiramate which is being tapered, and duloxetine.    Her headache presentation shows a long history of migraine, starting as episodic before transforming to chronic years ago.  I suspect she is predisposed to headache and migraine on a genetic basis.  This is complicated by previous sinus surgery, rhinoplasty, chronic neck and back pain, EDS, TMD, dysautonomia, MCAS, medication overuse, opioid use.     No obvious neurologic deficits on virtual visit. She has had head imaging previously, most recently MRI, MRV, and MRA of the brain in June 2024.     Today, reviewed her current and previous treatment strategies for chronic migraine, and discussed other multidisciplinary approaches to living with chronic pain:    Regarding preventative treatments, the following could be trialed:  CGRP inhibitor - Vyepti?  Nortriptyline  Venlafaxine      Verapamil      Atenolol      Depakote       With her complicating features, physical therapy and mental health care will be important to optimizing her function.        I spent over 40 minutes on record review, patient care, documentation, and coordination of care today.    Michaela Mesa MD  Neurology     Subjective:    Ada Welch has been living with headaches since  childhood, with migraine at age 30. These were intermittent for some time, changing around 2006/2007 to be more frequent and severe, including prolonged attack lasting months in 2009. She recalls sinus surgery in 2007, a nose surgery in 2010. She took DHE at home for treatment of some of these. Gradually worsened over time.    They have always started in the back of her head and radiate forward to either side of her head, left sided most recently (since Botox and MRI brain for a study 6 weeks ago she reports). Pressure builds at the back of her head, then feels like a dull brain freeze in the front, like her skull isn't big enough for her brain.     In last 1.5 years, cannot read and reports numbness/buzzing from jaw into lips, and symptoms of pain, numbness, weakness in arms, hands, legs.     Pain is very severe. Not functioning through pain.    Headache is worse if spends the day up and around. Headache calms down from 9.5/10 to 7/10 if she lays down for 6 hours, but doesn't go away. She's been at 6-7/10 or higher for the past 5-6 years.    30/30 headache days per month, with 30/30 severe headache days per month.    Associated with nausea, vomiting, photophobia, phonophobia.     For treatment, she takes:  Ice cap, hot compresses  Water with electrolytes  Tea  Maxalt  Imitrex injection  Ubrelvy  Meditation   Essential oils, peppermint, tea tree  TENS unit  White noise, music  Epson salt bath  Medical cannabis balm  Muscle relaxers - tizanidine most recently    No particular order to trying treatments. Depends on where pain is.  Maxalt and Imitrex for migraine help sometimes, but not lately.  Ubrelvy has been helpful, but not lately.    For prevention, she takes:  Botox is helpful in the past  Propranolol 120 mg is helpful in the past  Tapering topiramate now, she explains  Duloxetine for nerve pain    Previously, amitriptyline with 50 pound weight gain  CGRP inhibitors are not affordable     Gabapentin, Lyrica with  decrease in bp and hr  Depakote in the past for low back pain    Used to see a PT for pool therapy    PMH:  EDS  Dysautonomia  Chiari malformation  Craniocervical instability  Spondylosis  MCAS  TMD    Family hx:  Child with migraine  Sisters with migraine  Mother and sister with EDS  Father with headaches,  at age 35 from cancer    Objective:    Vitals: LMP  (LMP Unknown)   General: Cooperative, appears to be in distress  Neurologic:  Mental Status: Fully alert, attentive and oriented. Speech clear and fluent.   Cranial Nerves: Facial movements symmetric.   Motor: No abnormal movements.      Pertinent Investigations:    MRI , MRA, and MRV brain wo contrast (2024):  1. No evidence of acute intracranial abnormality.   2. Few foci of T2 prolongation in the supratentorial white matter nonspecific.   Differential considerations include sequela of migraine headaches, prior   inflammation and chronic small vessel disease.   3. Mild mucosal thickening in the maxillary sinuses with air-fluid level could   represent acute sinusitis. Mild mucosal thickening in the right frontal sinus   and anterior ethmoid air cells.     MRA and MRV were unremarkable.        10/8/2024    10:38 AM 2024     9:32 AM 2025     2:28 PM   HIT-6   When you have headaches, how often is the pain severe 11 13 11   How often do headaches limit your ability to do usual daily activities including household work, work, school, or social activities? 11 13 11   When you have a headache, how often do you wish you could lie down? 13 13 11   In the past 4 weeks, how often have you felt too tired to do work or daily activities because of your headaches 11 13 11   In the past 4 weeks, how often have you felt fed up or irritated because of your headaches 11 13 10   In the past 4 weeks, how often did headaches limit your ability to concentrate on work or daily activities 11 13 11   HIT-6 Total Score 68 78  65        Patient-reported            5/21/2024     8:58 AM   MIDAS - in the past three months:   On how many days did you miss work or school because of your headaches? 0   How many days was your productivity at work or school reduced by half or more because of your headaches? 0   On how many days did you not do household work because of your headaches? 90   How many days was your productivity in household work reduced by half or more because of your headaches? 0   On how many days did you miss family, social, or leisure activities because of your headaches? 90   On how many days did you have a headache? 90   On a scale of 0-10, on average how painful were these headaches? 7   MIDAS Score 180 (IV - Severe Disability)

## 2025-05-21 NOTE — LETTER
"5/21/2025       RE: Ada Welch  [address] [facility] [address]     Dear Colleague,    Thank you for referring your patient, Ada Welch, to the [facility] PHYSICAL MEDICINE AND REHABILITATION CLINIC [place] at [facility]. Please see a copy of my visit note below.      [facility]     COMPLEX MULTIDISCIPLINARY HEADACHE PROGRAM  PHYSICAL MEDICINE & REHABILITATION    Ada Welch   YOB: 1970  MRN: [id number]   Date of Service: 05/21/2025      History of Present Illness:  Ada Welch is a 54 year old female from [place] with a longstanding history of intractable headaches, currently experiencing daily headaches (30/30 days per month). She reports that her headaches typically begin within 30 minutes of waking and worsen the longer she remains upright. Associated symptoms include numbness and tingling that start in the jaw/lips and radiate, as well as nausea, vomiting, photophobia, and phonophobia if she is unable to lie down throughout the day.    She describes the current headache pattern over the past six weeks as \"mind blowing\" and functionally debilitating. The pain now begins in the posterior head/neck and radiates forward. She cannot drive and is currently unable to function through the pain. This worsening began shortly after participating in a pain trial and receiving her scheduled Botox injections approximately six weeks ago.    She has a known history of lower spine pathology for which she is legally disabled, and she was bedridden from 2015 to 2018 due to these issues. She believes her cervical spine degeneration and a known Chiari malformation are contributing to her headaches. Last week, she experienced a transient left facial droop coinciding with a severe headache, which lasted 4-5 days and gradually resolved.    She receives Botox injections every three months for " headache management and is the primary caregiver for her 13-year-old child.      Past Medical History:   Diagnosis Date     Allergic rhinitis 09/2007     Anxiety      Arthritis 11/01/2013    Found during search for cause of back pain     Autoimmune disease 2016    Immunosuppresive     Autonomic dysfunction      Bleeding disorder 2016    Bruise easily     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     Cervicalgia      Chronic sinusitis 00/2007    Diagnosed with chronic pansinusitis 2016     Coagulation disorder     factor 8     CSF leak     Chiari malformation     Depression      Tom-Danlos disease      Eosinophilic esophagitis 08/2018     Gastroesophageal reflux disease 3/1/2017    I have large hiatal hernia     Hiatal hernia 2016    Have adeline hiatel hernia     Hoarseness Unsure    It comes and goes     Hypertension      Hypothyroid      IBS (irritable bowel syndrome) with constipation     improved on Linzess     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     Irregular heart beat      Migraines 1/1/2007    Unsure of exact date     Nasal polyps 2013    Were revoved 03/2017, benign     Orthostatic hypotension      Other nervous system complications 1/2014    Autonomic nervous system disorder, neuralgia, neuropathy     Swelling, mass, or lump in head and neck 08/2016    Lymph node has been growing for last year     Thyroid disease 01/01/2008     Urinary incontinence      Urinary urgency         Family History   Problem Relation Age of Onset     Connective Tissue Disorder Mother         eds     Thyroid Disease Mother      Cancer Father         Malignant tumor on base of spine that grew into spinal column & brain     Migraines Father      Diabetes Maternal Grandmother         Diagnosed later in life     Heart Disease Maternal Grandmother      Hypertension Maternal Grandmother      Diabetes Paternal Grandmother         Elderly diabetes     Cancer Paternal Grandmother          Uterine & Ovarian cancer     Diabetes Paternal Grandfather         Diagnosed later in life     Cancer Paternal Grandfather         Prostate cancer that spread to colon and lungs     Connective Tissue Disorder Sister         eds     Asthma Sister         Pediatric Asthma     Migraines Sister      Thyroid Disease Sister      Thyroid Disease Sister         ,     Migraines Sister      Migraines Sister      Thyroid Disease Sister      Asthma Son         Pediatric Asthma     Breast Cancer No family hx of      Glaucoma No family hx of      Macular Degeneration No family hx of        Social History     Socioeconomic History     Marital status:      Spouse name: Carry     Number of children: 5     Years of education: Not on file     Highest education level: Not on file   Occupational History     Not on file   Tobacco Use     Smoking status: Former     Current packs/day: 0.00     Average packs/day: 0.2 packs/day for 22.9 years (4.6 ttl pk-yrs)     Types: Cigarettes     Start date: 1991     Quit date: 2013     Years since quittin.5     Passive exposure: Past     Smokeless tobacco: Never   Vaping Use     Vaping status: Some Days     Substances: THC   Substance and Sexual Activity     Alcohol use: Yes     Comment: Occasionally     Drug use: Yes     Types: Marijuana     Comment: medical marijuana     Sexual activity: Yes     Partners: Male     Birth control/protection: Female Surgical   Other Topics Concern     Parent/sibling w/ CABG, MI or angioplasty before 65F 55M? No   Social History Narrative    5 kids: living with Radha Ballard/Dino had baby    Leslie,     Shay Brito expecting    Filing for disability        5 yo and 6 months grandchildren Marcelo and Jennifer     Social Drivers of Health     Financial Resource Strain: Low Risk  (3/19/2025)    Financial Resource Strain      Within the past 12 months, have you or your family members you live with been unable to get utilities (heat,  electricity) when it was really needed?: No   Food Insecurity: Low Risk  (3/19/2025)    Food Insecurity      Within the past 12 months, did you worry that your food would run out before you got money to buy more?: No      Within the past 12 months, did the food you bought just not last and you didn t have money to get more?: No   Transportation Needs: Low Risk  (3/19/2025)    Transportation Needs      Within the past 12 months, has lack of transportation kept you from medical appointments, getting your medicines, non-medical meetings or appointments, work, or from getting things that you need?: No   Physical Activity: Not on file   Stress: Not on file   Social Connections: Not on file   Interpersonal Safety: Low Risk  (10/31/2024)    Interpersonal Safety      Do you feel physically and emotionally safe where you currently live?: Yes      Within the past 12 months, have you been hit, slapped, kicked or otherwise physically hurt by someone?: No      Within the past 12 months, have you been humiliated or emotionally abused in other ways by your partner or ex-partner?: No   Housing Stability: Low Risk  (3/19/2025)    Housing Stability      Do you have housing? : Yes      Are you worried about losing your housing?: No         Current Outpatient Medications   Medication Sig Dispense Refill     acetaminophen (TYLENOL) 500 MG tablet Take 1,000 mg by mouth every 6 hours as needed for mild pain       albuterol (PROAIR HFA/PROVENTIL HFA/VENTOLIN HFA) 108 (90 Base) MCG/ACT inhaler Inhale 2 puffs into the lungs every 6 hours as needed for shortness of breath, wheezing or cough. 18 g 0     atorvastatin (LIPITOR) 20 MG tablet TAKE ONE TABLET BY MOUTH ONCE DAILY 90 tablet 3     budesonide-formoterol (SYMBICORT) 80-4.5 MCG/ACT Inhaler Inhale 2 puffs into the lungs 2 times daily. 10 g 0     buprenorphine (BUTRANS) 10 MCG/HR WK patch Place onto the skin once a week. 4 patch 1     cetirizine (ZYRTEC) 10 MG tablet Take 10 mg by mouth  daily as needed        co-enzyme Q-10 100 MG CAPS capsule Take 100 mg by mouth daily       DULoxetine (CYMBALTA) 30 MG capsule Take 1 capsule (30 mg) by mouth at bedtime. TAKE ONE CAPSULE BY MOUTH EVERY NIGHT AT BEDTIME AND TAKE 60 MG CAPSULE IN THE MORNING 90 capsule 1     DULoxetine (CYMBALTA) 60 MG capsule TAKE ONE CAPSULE BY MOUTH EVERY MORNING AND TAKE 30 MG CAPSULE IN THE EVENING 90 capsule 1     EPINEPHrine (ANY BX GENERIC EQUIV) 0.3 MG/0.3ML injection 2-pack Inject 0.3 mLs (0.3 mg) into the muscle as needed for anaphylaxis May repeat one time in 5-15 minutes if response to initial dose is inadequate. 2 each 1     estradiol (VIVELLE-DOT) 0.025 MG/24HR bi-weekly patch Place 1 patch over 96 hours onto the skin twice a week. 24 patch 1     ferrous fumarate 65 mg, [place]. FE,-Vitamin C 125 mg (VITRON-C)  MG TABS tablet Take 1 tablet by mouth every other day 60 tablet 4     fluticasone (FLONASE) 50 MCG/ACT nasal spray SPRAY 2 SPRAYS INTO BOTH NOSTRILS DAILY 48 g 11     guanFACINE (TENEX) 1 MG tablet TAKE ONE TAB BY MOUTH ONCE NIGHTLY AT BEDTIME       HYDROmorphone (DILAUDID) 2 MG tablet Take 1 tablet (2 mg) by mouth daily as needed for severe pain. 10 tablet 0     hydrOXYzine HCl (ATARAX) 25 MG tablet Take 1 tablet (25 mg) by mouth every 6 hours as needed for anxiety. 180 tablet 1     levothyroxine (SYNTHROID/LEVOTHROID) 25 MCG tablet Take 1 tablet (25 mcg) by mouth every morning (before breakfast). 90 tablet 1     Lidocaine (LIDOCARE) 4 % Patch Place 1-3 patches onto the skin every 24 hours To prevent lidocaine toxicity, patient should be patch free for 12 hrs daily. 20 patch 0     medical cannabis (Patient's own supply) 1 Dose See Admin Instructions. (The purpose of this order is to document that the patient reports taking medical cannabis.  This is not a prescription, and is not used to certify that the patient has a qualifying medical condition.)       medical cannabis (Patient's own supply.  Not a  prescription) Take 1 Dose by mouth See Admin Instructions Capsule formulation - uses as needed       methylPREDNISolone (MEDROL DOSEPAK) 4 MG tablet therapy pack Follow Package Directions 21 tablet 0     metoclopramide (REGLAN) 10 MG tablet Take 1 tablet (10 mg) by mouth 4 times daily as needed (headache). 40 tablet 3     Multiple Vitamins-Minerals (MULTIVITAMIN PO) Take 1 tablet by mouth daily        naloxone (NARCAN) 4 MG/0.1ML nasal spray Spray 1 spray (4 mg) into one nostril alternating nostrils once as needed for opioid reversal every 2-3 minutes until assistance arrives 0.2 mL 0     naproxen (NAPROSYN) 250 MG tablet Take 250 mg by mouth 2 times daily (with meals).       phentermine 15 MG capsule Take 1 capsule (15 mg) by mouth every morning. Take for 2 weeks then stop. 14 capsule 0     Probiotic Product (PROBIOTIC DAILY PO) Take 6 packets by mouth every morning Five-Lac       progesterone (PROMETRIUM) 100 MG capsule Take 1 capsule (100 mg) by mouth daily. 90 capsule 4     propranolol ER (INDERAL LA) 120 MG 24 hr capsule Take 1 capsule (120 mg) by mouth daily. 90 capsule 3     ramelteon (ROZEREM) 8 MG tablet TAKE ONE TABLET BY MOUTH ONCE DAILY AT BEDTIME. 30 tablet 5     rizatriptan (MAXALT-MLT) 5 MG ODT TAKE 1-2 TABLETS (5-10 MG) BY MOUTH AT ONSET OF HEADACHE FOR MIGRAINE (MAX 30 MG IN 24 HOURS) 18 tablet 11     Semaglutide-Weight Management (WEGOVY) 0.5 MG/0.5ML pen Inject 0.5 mg subcutaneously once a week. 2 mL 0     SUMAtriptan (IMITREX STATDOSE) 6 MG/0.5ML pen injector kit Inject 0.5 ml (6 mg) subcutaneously at onset of migraine, May repeat in 1 hour , Max 12 mg/24 hrs 2 kit 11     tiZANidine (ZANAFLEX) 4 MG tablet One-half tab bedtime for two nights, one bedtime, may repeat after 4 hours 60 tablet 3     topiramate (TOPAMAX) 100 MG tablet Take 1 tablet (100 mg) by mouth daily. 90 tablet 1     traMADol (ULTRAM) 50 MG tablet Take 1 tablet (50 mg) by mouth every 6 hours as needed for pain. 65 tablet 0      ubrogepant (UBRELVY) 100 MG tablet Take 1 tablet (100 mg) by mouth at onset of headache (headache). 10 tablet 11     vitamin B complex with vitamin C (STRESS TAB) tablet Take 1 tablet by mouth daily       VITAMIN D, CHOLECALCIFEROL, PO Take 2,000 Units by mouth daily           Allergies   Allergen Reactions     Baclofen Other (See Comments)     Loss of muscle tone. Muscle weakness.     Bee Venom Shortness Of Breath, Palpitations, Anaphylaxis and Difficulty breathing     Patient does carry Epi-Pen     Chicken-Derived Products (Egg) Nausea and Diarrhea     Compazine [Prochlorperazine]      Extreme jittery     Food      Milk     Gabapentin      Dizzy     Gluten Meal GI Disturbance     Wheat bran and wheat     Pregabalin      Seasonal Allergies      Dust, mold       Physical Examination:  VS: LMP  (LMP Unknown)      GEN: Pleasant and cooperative, in no acute distress  HEENT: No facial asymmetry      Assessment & Recommendations:  Ada Welch is a 54 year old female who presents to multidisciplinary headache clinic for recommendations regarding her headaches which have been historically difficult to treat.     Given the patient s report of significantly worsened headaches following her most recent Botox treatment (administered around the same time as a pain trial six weeks ago) it is unclear whether Botox continues to provide therapeutic benefit. Due to this change in headache pattern, severity, and associated functional decline, I recommend deferring further Botox injections until there is better understanding of the underlying cause. A comprehensive neurologic re-evaluation is advised, as recommended by the Neurology team.    Additionally, the patient s mental health should be treated as a high priority, given the chronicity and severity of her symptoms, current inability to function or drive, and the demands of raising her child. Ongoing psychological support and formal mental health evaluation may help improve  overall coping, quality of life, and treatment adherence.    Seema Berger MD  Physical Medicine and Rehabilitation  [phone]      I spent a total of 40 minutes managing the care of Ada Welch. Over 50% of my time was spent counseling the patient and coordinating care. Please see note for details.                Again, thank you for allowing me to participate in the care of your patient.      Sincerely,    Seema Berger MD

## 2025-05-21 NOTE — PROGRESS NOTES
Patient presents to the clinic today for a visit with HARPER LARRY MD regarding Pain Management.    UDS/CSA- 05.25.2023    Medications- Butrans patch day not currently     Notes    Sierra Baptiste  [facility] Clinical Assistant

## 2025-05-21 NOTE — PROGRESS NOTES
"The longer she is upright, she gets feelings of numbness/tingling in jaw/lips and this travels along. Does not wake up with a headache, but within a half hour her headache starts. 30/30 headache days per month. Will get nausea, vomiting, photophobia and phonophobia if up all day and unable to lay down. She is legally disabled for lower spine symptoms. She was bedridden for 5 years ([id number]). Lives in [place]. Gets Botox every 3 months. Every since the. She was in a pain trial 6 weeks ago around the same time of her Botox, and since then her headaches have been way worse. Pain is worse in the back and moves forward. Her headaches are now \"mind blowing\" for the last 6 weeks. She cannot function through them. She currently cannot drive. She is raising a 13 year old. She had left facial droop last week which coincided with a headache and lasted 4-5 days and gradually went away. She thinks the degenerative issues in her neck are contributing to her headaches. She also thinks the chiari malformation is contributing.   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    Irregular heart beat     Migraines 1/1/2007    Unsure of exact date    Nasal polyps 2013    Were revoved 03/2017, benign    Orthostatic hypotension     Other nervous system complications 1/2014    Autonomic nervous system disorder, neuralgia, neuropathy    Swelling, mass, or lump in head and neck 08/2016    Lymph node has been growing for last year    Thyroid disease 01/01/2008    Urinary incontinence     Urinary urgency         Family History   Problem Relation Age of Onset    Connective Tissue Disorder Mother         eds    Thyroid Disease Mother     Cancer Father         Malignant tumor on base of spine that grew into spinal column & brain    Migraines Father     Diabetes Maternal Grandmother         Diagnosed later in life    Heart Disease Maternal Grandmother     Hypertension Maternal Grandmother     Diabetes Paternal Grandmother         Elderly diabetes    Cancer Paternal Grandmother         Uterine & Ovarian cancer    Diabetes Paternal Grandfather         Diagnosed later in life    Cancer Paternal Grandfather         Prostate cancer that spread to colon and lungs    Connective Tissue Disorder Sister         eds    Asthma Sister         Pediatric Asthma    Migraines Sister     Thyroid Disease Sister     Thyroid Disease Sister         ,    Migraines Sister     Migraines  Sister     Thyroid Disease Sister     Asthma Son         Pediatric Asthma    Breast Cancer No family hx of     Glaucoma No family hx of     Macular Degeneration No family hx of        Social History     Socioeconomic History    Marital status:      Spouse name: Carry    Number of children: 5    Years of education: Not on file    Highest education level: Not on file   Occupational History    Not on file   Tobacco Use    Smoking status: Former     Current packs/day: 0.00     Average packs/day: 0.2 packs/day for 22.9 years (4.6 ttl pk-yrs)     Types: Cigarettes     Start date: 1991     Quit date: 2013     Years since quittin.5     Passive exposure: Past    Smokeless tobacco: Never   Vaping Use    Vaping status: Some Days    Substances: THC   Substance and Sexual Activity    Alcohol use: Yes     Comment: Occasionally    Drug use: Yes     Types: Marijuana     Comment: medical marijuana    Sexual activity: Yes     Partners: Male     Birth control/protection: Female Surgical   Other Topics Concern    Parent/sibling w/ CABG, MI or angioplasty before 65F 55M? No   Social History Narrative    5 kids: living with Radha Ballard/Dino had baby    Leslie,     Shay Brito expecting    Filing for disability        5 yo and 6 months grandchildren Marcelo and Jennifer     Social Drivers of Health     Financial Resource Strain: Low Risk  (3/19/2025)    Financial Resource Strain     Within the past 12 months, have you or your family members you live with been unable to get utilities (heat, electricity) when it was really needed?: No   Food Insecurity: Low Risk  (3/19/2025)    Food Insecurity     Within the past 12 months, did you worry that your food would run out before you got money to buy more?: No     Within the past 12 months, did the food you bought just not last and you didn t have money to get more?: No   Transportation Needs: Low Risk  (3/19/2025)    Transportation Needs     Within the past 12  months, has lack of transportation kept you from medical appointments, getting your medicines, non-medical meetings or appointments, work, or from getting things that you need?: No   Physical Activity: Not on file   Stress: Not on file   Social Connections: Not on file   Interpersonal Safety: Low Risk  (10/31/2024)    Interpersonal Safety     Do you feel physically and emotionally safe where you currently live?: Yes     Within the past 12 months, have you been hit, slapped, kicked or otherwise physically hurt by someone?: No     Within the past 12 months, have you been humiliated or emotionally abused in other ways by your partner or ex-partner?: No   Housing Stability: Low Risk  (3/19/2025)    Housing Stability     Do you have housing? : Yes     Are you worried about losing your housing?: No         Current Outpatient Medications   Medication Sig Dispense Refill    acetaminophen (TYLENOL) 500 MG tablet Take 1,000 mg by mouth every 6 hours as needed for mild pain      albuterol (PROAIR HFA/PROVENTIL HFA/VENTOLIN HFA) 108 (90 Base) MCG/ACT inhaler Inhale 2 puffs into the lungs every 6 hours as needed for shortness of breath, wheezing or cough. 18 g 0    atorvastatin (LIPITOR) 20 MG tablet TAKE ONE TABLET BY MOUTH ONCE DAILY 90 tablet 3    budesonide-formoterol (SYMBICORT) 80-4.5 MCG/ACT Inhaler Inhale 2 puffs into the lungs 2 times daily. 10 g 0    buprenorphine (BUTRANS) 10 MCG/HR WK patch Place onto the skin once a week. 4 patch 1    cetirizine (ZYRTEC) 10 MG tablet Take 10 mg by mouth daily as needed       co-enzyme Q-10 100 MG CAPS capsule Take 100 mg by mouth daily      DULoxetine (CYMBALTA) 30 MG capsule Take 1 capsule (30 mg) by mouth at bedtime. TAKE ONE CAPSULE BY MOUTH EVERY NIGHT AT BEDTIME AND TAKE 60 MG CAPSULE IN THE MORNING 90 capsule 1    DULoxetine (CYMBALTA) 60 MG capsule TAKE ONE CAPSULE BY MOUTH EVERY MORNING AND TAKE 30 MG CAPSULE IN THE EVENING 90 capsule 1    EPINEPHrine (ANY BX GENERIC EQUIV)  0.3 MG/0.3ML injection 2-pack Inject 0.3 mLs (0.3 mg) into the muscle as needed for anaphylaxis May repeat one time in 5-15 minutes if response to initial dose is inadequate. 2 each 1    estradiol (VIVELLE-DOT) 0.025 MG/24HR bi-weekly patch Place 1 patch over 96 hours onto the skin twice a week. 24 patch 1    ferrous fumarate 65 mg, Chilkat. FE,-Vitamin C 125 mg (VITRON-C)  MG TABS tablet Take 1 tablet by mouth every other day 60 tablet 4    fluticasone (FLONASE) 50 MCG/ACT nasal spray SPRAY 2 SPRAYS INTO BOTH NOSTRILS DAILY 48 g 11    guanFACINE (TENEX) 1 MG tablet TAKE ONE TAB BY MOUTH ONCE NIGHTLY AT BEDTIME      HYDROmorphone (DILAUDID) 2 MG tablet Take 1 tablet (2 mg) by mouth daily as needed for severe pain. 10 tablet 0    hydrOXYzine HCl (ATARAX) 25 MG tablet Take 1 tablet (25 mg) by mouth every 6 hours as needed for anxiety. 180 tablet 1    levothyroxine (SYNTHROID/LEVOTHROID) 25 MCG tablet Take 1 tablet (25 mcg) by mouth every morning (before breakfast). 90 tablet 1    Lidocaine (LIDOCARE) 4 % Patch Place 1-3 patches onto the skin every 24 hours To prevent lidocaine toxicity, patient should be patch free for 12 hrs daily. 20 patch 0    medical cannabis (Patient's own supply) 1 Dose See Admin Instructions. (The purpose of this order is to document that the patient reports taking medical cannabis.  This is not a prescription, and is not used to certify that the patient has a qualifying medical condition.)      medical cannabis (Patient's own supply.  Not a prescription) Take 1 Dose by mouth See Admin Instructions Capsule formulation - uses as needed      methylPREDNISolone (MEDROL DOSEPAK) 4 MG tablet therapy pack Follow Package Directions 21 tablet 0    metoclopramide (REGLAN) 10 MG tablet Take 1 tablet (10 mg) by mouth 4 times daily as needed (headache). 40 tablet 3    Multiple Vitamins-Minerals (MULTIVITAMIN PO) Take 1 tablet by mouth daily       naloxone (NARCAN) 4 MG/0.1ML nasal spray Spray 1 spray (4  mg) into one nostril alternating nostrils once as needed for opioid reversal every 2-3 minutes until assistance arrives 0.2 mL 0    naproxen (NAPROSYN) 250 MG tablet Take 250 mg by mouth 2 times daily (with meals).      phentermine 15 MG capsule Take 1 capsule (15 mg) by mouth every morning. Take for 2 weeks then stop. 14 capsule 0    Probiotic Product (PROBIOTIC DAILY PO) Take 6 packets by mouth every morning Five-Lac      progesterone (PROMETRIUM) 100 MG capsule Take 1 capsule (100 mg) by mouth daily. 90 capsule 4    propranolol ER (INDERAL LA) 120 MG 24 hr capsule Take 1 capsule (120 mg) by mouth daily. 90 capsule 3    ramelteon (ROZEREM) 8 MG tablet TAKE ONE TABLET BY MOUTH ONCE DAILY AT BEDTIME. 30 tablet 5    rizatriptan (MAXALT-MLT) 5 MG ODT TAKE 1-2 TABLETS (5-10 MG) BY MOUTH AT ONSET OF HEADACHE FOR MIGRAINE (MAX 30 MG IN 24 HOURS) 18 tablet 11    Semaglutide-Weight Management (WEGOVY) 0.5 MG/0.5ML pen Inject 0.5 mg subcutaneously once a week. 2 mL 0    SUMAtriptan (IMITREX STATDOSE) 6 MG/0.5ML pen injector kit Inject 0.5 ml (6 mg) subcutaneously at onset of migraine, May repeat in 1 hour , Max 12 mg/24 hrs 2 kit 11    tiZANidine (ZANAFLEX) 4 MG tablet One-half tab bedtime for two nights, one bedtime, may repeat after 4 hours 60 tablet 3    topiramate (TOPAMAX) 100 MG tablet Take 1 tablet (100 mg) by mouth daily. 90 tablet 1    traMADol (ULTRAM) 50 MG tablet Take 1 tablet (50 mg) by mouth every 6 hours as needed for pain. 65 tablet 0    ubrogepant (UBRELVY) 100 MG tablet Take 1 tablet (100 mg) by mouth at onset of headache (headache). 10 tablet 11    vitamin B complex with vitamin C (STRESS TAB) tablet Take 1 tablet by mouth daily      VITAMIN D, CHOLECALCIFEROL, PO Take 2,000 Units by mouth daily           Allergies   Allergen Reactions    Baclofen Other (See Comments)     Loss of muscle tone. Muscle weakness.    Bee Venom Shortness Of Breath, Palpitations, Anaphylaxis and Difficulty breathing     Patient  does carry Epi-Pen    Chicken-Derived Products (Egg) Nausea and Diarrhea    Compazine [Prochlorperazine]      Extreme jittery    Food      Milk    Gabapentin      Dizzy    Gluten Meal GI Disturbance     Wheat bran and wheat    Pregabalin     Seasonal Allergies      Dust, mold       Physical Examination:  VS: LMP  (LMP Unknown)      GEN: Pleasant and cooperative, in no acute distress  HEENT: No facial asymmetry      Assessment & Recommendations:  Ada Welch is a 54 year old female who presents to multidisciplinary headache clinic for recommendations regarding her headaches which have been historically difficult to treat.     Given the patient s report of significantly worsened headaches following her most recent Botox treatment (administered around the same time as a pain trial six weeks ago) it is unclear whether Botox continues to provide therapeutic benefit. Due to this change in headache pattern, severity, and associated functional decline, I recommend deferring further Botox injections until there is better understanding of the underlying cause. A comprehensive neurologic re-evaluation is advised, as recommended by the Neurology team.    Additionally, the patient s mental health should be treated as a high priority, given the chronicity and severity of her symptoms, current inability to function or drive, and the demands of raising her child. Ongoing psychological support and formal mental health evaluation may help improve overall coping, quality of life, and treatment adherence.    Seema Berger MD  Physical Medicine and Rehabilitation  [phone]      I spent a total of 40 minutes managing the care of Ada Welch. Over 50% of my time was spent counseling the patient and coordinating care. Please see note for details.

## 2025-05-21 NOTE — PATIENT INSTRUCTIONS
"PLAN:    Order placed for occipital nerve blocks with Dr. Duran.    Order placed for craniosacral physical therapy.    Tizanidine to help with sleep, muscle relaxant, 4 mg tablet 1/2 tablet at bedtime for 2 nights, may increase to 1 tablet at bedtime for 4 nights, if needed 1-1/2 at bedtime, may repeat if wake after 4 hours.  May use this to take the place of Flexeril, Topamax.    Discussed could use nortriptyline in place of amitriptyline if needed with a lower dose of Topamax.    Discussed the role of frequency specific microcurrent and either Danlos syndrome. May contact these providers to see if they take your insurance or you want to work with transitions in health (acupuncture [phone], [facility] [phone], [facility] [phone], [facility] [phone].    There are protocols using the frequency specific microcurrent, and limitation, 1 product is \"collaGen\" from [facility]    Repeat brain MRI with concern for Chiari malformation.  In future may consider agents such as acetazolamide.    Dr. Coronado to connect with Dr. Duran about coordination with the tramadol Butrans hydromorphone.    We will continue the medical cannabis program.    Laboratories ordered at [facility].    Follow-up with Coy Franks [facility] pharmacist in 4 weeks and with Dr. Coronado in 6 to 8 weeks      "

## 2025-05-21 NOTE — TELEPHONE ENCOUNTER
PA Initiation    Medication: WEGOVY 0.5 MG/0.5ML SC SO[employer] INSURANCE 2 OF 2  Insurance Company: [employer] - Phone [phone] Fax [phone]  Pharmacy Filling the Rx: [place], [place] - [address] [place] [address]  Filling Pharmacy Phone: [phone]  Filling Pharmacy Fax: [phone]  Start Date: 5/21/2025

## 2025-05-22 NOTE — PATIENT INSTRUCTIONS
"Recommendations from today's MTM visit:                                                    MTM (medication therapy management) is a service provided by a clinical pharmacist designed to help you get the most of out of your medicines.      Continue with as needed use of sumatriptan, rizatriptan, Ubrelvy, and metoclopramide   Follow pain providers recommendation for topiramate dose reduction and starting tizanidine to replace cyclobenzaprine   Preventative medications that could be considered: CGRP inhibitor (we could see if there is patient assistance available or see if more affordable with Medicare 2025 changes), nortriptyline, atenolol (would replace propranolol and need to discuss with cardiology), venlafaxine (would replace duloxetine), and/or verapamil   Please make sure you take celecoxib and naproxen with food, these can be very hard on the stomach   Just as an FYI - acetaminophen, NSAIDs, and opioids can lead to medication overuse headaches.     Follow-up: as needed based on medication questions and concerns.     It was great speaking with you today.  I value your experience and would be very thankful for your time in providing feedback in our clinic survey. In the next few days, you may receive an email or text message from [employer] with a link to a survey related to your  clinical pharmacist.\"     To schedule another MTM appointment, please call the clinic directly or you may call the MTM scheduling line at [phone] or toll-free at [phone].     My Clinical Pharmacist's contact information:                                                      Please feel free to contact me with any questions or concerns you have.      Lisbeth Blake, [place], BCACP  Medication Therapy Management Pharmacist   [facility]     "

## 2025-05-22 NOTE — PROGRESS NOTES
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  Reviewed with either Danlos syndrome and connective tissue problems the use of frequency specific microcurrent modality, with collagen supplementation resources discussed.    Suggest decreasing Topamax as cognitive concerns are significant element, and her anticholinergic medications.  Discussed could use agents such as tizanidine to help with sleep.    She has found craniosacral therapy in the past working with other healthcare systems and reviewed we do have some with [facility].    Suggested she return for occipital nerve blocks presently with the flareup after the MRI.    Reviewed some other functional medicine approaches with her GI system and mast cell activation.    She would like to pursue these.    Discussed I would contact Dr. Martinez how he would like to collaborate.    Total time 65 minutes.     minutes spent on the date of encounter doing chart review, history, and exam documentation and further activities as noted above.     Gagandeep Coronado MD  [facility]

## 2025-05-27 ENCOUNTER — VIRTUAL VISIT (OUTPATIENT)
Dept: INTERNAL MEDICINE | Facility: CLINIC | Age: 55
End: 2025-05-27
Payer: COMMERCIAL

## 2025-05-27 ENCOUNTER — VIRTUAL VISIT (OUTPATIENT)
Dept: NEUROLOGY | Facility: CLINIC | Age: 55
End: 2025-05-27
Payer: COMMERCIAL

## 2025-05-27 VITALS — BODY MASS INDEX: 23.17 KG/M2 | HEIGHT: 67 IN

## 2025-05-27 DIAGNOSIS — G43.719 INTRACTABLE CHRONIC MIGRAINE WITHOUT AURA AND WITHOUT STATUS MIGRAINOSUS: Primary | ICD-10-CM

## 2025-05-27 DIAGNOSIS — R53.83 LOW ENERGY: ICD-10-CM

## 2025-05-27 DIAGNOSIS — Q79.60 EHLERS-DANLOS SYNDROME: ICD-10-CM

## 2025-05-27 DIAGNOSIS — N95.1 MENOPAUSAL SYNDROME (HOT FLASHES): ICD-10-CM

## 2025-05-27 DIAGNOSIS — R68.2 DRY MOUTH: ICD-10-CM

## 2025-05-27 DIAGNOSIS — F45.42 PAIN DISORDER ASSOCIATED WITH PSYCHOLOGICAL AND PHYSICAL FACTORS: ICD-10-CM

## 2025-05-27 PROCEDURE — 98007 SYNCH AUDIO-VIDEO EST HI 40: CPT | Performed by: INTERNAL MEDICINE

## 2025-05-27 PROCEDURE — G2211 COMPLEX E/M VISIT ADD ON: HCPCS | Performed by: INTERNAL MEDICINE

## 2025-05-27 PROCEDURE — 1125F AMNT PAIN NOTED PAIN PRSNT: CPT | Performed by: INTERNAL MEDICINE

## 2025-05-27 ASSESSMENT — HEADACHE IMPACT TEST (HIT 6)
HOW OFTEN HAVE YOU FELT FED UP OR IRRITATED BECAUSE OF YOUR HEADACHES: SOMETIMES
WHEN YOU HAVE A HEADACHE HOW OFTEN DO YOU WISH YOU COULD LIE DOWN: VERY OFTEN
HOW OFTEN HAVE YOU FELT TOO TIRED TO WORK BECAUSE OF YOUR HEADACHES: VERY OFTEN
HOW OFTEN DO HEADACHES LIMIT YOUR DAILY ACTIVITIES: VERY OFTEN
WHEN YOU HAVE HEADACHES HOW OFTEN IS THE PAIN SEVERE: VERY OFTEN
HIT6 TOTAL SCORE: 65
HOW OFTEN DID HEADACHS LIMIT CONCENTRATION ON WORK OR DAILY ACTIVITY: VERY OFTEN

## 2025-05-27 ASSESSMENT — PAIN SCALES - GENERAL: PAINLEVEL_OUTOF10: SEVERE PAIN (7)

## 2025-05-27 NOTE — PROGRESS NOTES
[facility] HEADACHE [facility]INIC [place]  COMPREHENSIVE INTERDISCIPLINARY HEADACHE MANAGEMENT PROGRAM   [address]  3RD FLOOR  [facility] [address]  TEL: [phone]                                                            Comprehensive Interdisciplinary Headache Team  Lisbeth StuartD  Celine Stephenson MD PM&R  Bonita Jennings MD Neurology, Headache Specialist    Dear Drs ,   We had the pleasure of seeing your patient, Rebekah Caballero at our comprehensive headache clinic on 5/21/2025. As you know, this patient is a 54-year-old old with a long history of migraine starting as episodic before transformed to chronic years ago.    IMPRESSION:   Headache presentation shows a long history of migraine starting as episodic and transforming to chronic years ago and complicated by previous sinus surgery, rhinoplasty, chronic neck and back pain, EDS, TMD, dysautonomia, MCAS, medication overuse, opioid use.  PLAN:   Physical therapy and mental health care (has already established care)  Follow-up with recommendations of Dr. Coronado from pain clinic-physical therapy, starting tizanidine to replace cyclobenzaprine, topiramate dose reduction.  Continue with as needed rizatriptan, Ubrelvy and metoclopramide  Vyepti -patient is opened to any trial to help with migraines.  Return to clinic after 2 rounds of Vyepti if approved -approximately 6 months.       19 minutes spent on the date of the encounter doing video access, chart  review, treatment plan review, documentation and further activities as noted above      Fara KITCHEN, JOSIAS  Comprehensive Headache Clinic/Neurology  [phone]

## 2025-05-27 NOTE — NURSING NOTE
Current patient location: [address] [place] [address]    Is the patient currently in the state of [place]? YES    Visit mode: VIDEO    If the visit is dropped, the patient can be reconnected by:VIDEO VISIT: Text to cell phone:   Telephone Information:   Mobile [phone]       Will anyone else be joining the visit? NO  (If patient encounters technical issues they should call [phone]    Are changes needed to the allergy or medication list? No    Are refills needed on medications prescribed by this physician? NO    Rooming Documentation:  Questionnaire(s) completed    Reason for visit: Follow Up    Lazara TREVINO

## 2025-05-27 NOTE — PROGRESS NOTES
Ada is a 54 year old who is being evaluated via a billable video visit.    How would you like to obtain your AVS? MyChart  If the video visit is dropped, the invitation should be resent by: Text to cell phone: [phone]  Will anyone else be joining your video visit? No      Are refills needed on medications prescribed by this physician? NO    Lizzie Duncan VVF      Assessment & Plan     Intractable chronic migraine without aura and without status migrainosus  Meeting with complex HA clinic to ongoing management. Attempting to wean down on meds which may drive HA.    Pain disorder associated with psychological and physical factors  Tom-Danlos syndrome  Upcoming MRI to reevaluate Chiari malformation, going care with pain management    Menopausal syndrome (hot flashes)  Tolerating HRT well, states improved symptoms    Low energy  Patient feels related to weaning off of phentermine, advised ongoing monitoring prior to adding back given complexity of health issues and ongoing intractable HA. Advised she could try stopping melatonin sleep aid.  - TSH with free T4 reflex; Future    Dry mouth  Advised sialogogues, xylimelts, biotene.   - SSA Ro NICO Antibody IgG; Future  - SSB La NICO Antibody IgG; Future      The longitudinal plan of care for the diagnosis(es)/condition(s) as documented were addressed during this visit. Due to the added complexity in care, I will continue to support Ada in the subsequent management and with ongoing continuity of care.    >40 minutes spent today performing chart review, history and exam, documentation and further activities as noted above, exclusive of any procedures or EKG interpretation            Subjective   Ada is a 54 year old, presenting for the following health issues:  RECHECK and Back pain      Video Start Time: 9:20 AM    History of Present Illness       Back Pain:  She presents for follow up of back pain. Patient's back pain is a chronic problem.  Location of back  "pain:  Right lower back, left lower back, right middle of back, left middle of back, right upper back, left upper back, right side of neck, left side of neck, right shoulder, left shoulder, right buttock, left buttock, right hip, left hip, right side of waist and left side of waist  Description of back pain: burning, cramping, dull ache, sharp, shooting and stabbing  Back pain spreads: right buttocks, left buttocks, right thigh, left thigh, right knee, left knee, right foot, left foot, right shoulder, left shoulder, right side of neck and left side of neck    Since patient first noticed back pain, pain is: gradually worsening  Does back pain interfere with her job:  Not applicable       Diabetes:   She presents for follow up of diabetes.    She is not checking blood glucose.         She has no concerns regarding her diabetes at this time.  She is having excessive thirst and blurry vision.            Headaches:   Since the patient's last clinic visit, headaches are: worsened  The patient is getting headaches:  Daily  She is not able to do normal daily activities when she has a migraine.  The patient is taking the following rescue/relief medications:  Naproxyn (Aleve), Tylenol, sumatriptan (Imitrex), Maxalt and other   Patient states \"The relief is inconsistent\" from the rescue/relief medications.   The patient is taking the following medications to prevent migraines:  Topomax and other  In the past 4 weeks, the patient has gone to an Urgent Care or Emergency Room 0 times times due to headaches.    She eats 2-3 servings of fruits and vegetables daily.She consumes 0 sweetened beverage(s) daily.She exercises with enough effort to increase her heart rate 20 to 29 minutes per day.  She exercises with enough effort to increase her heart rate 5 days per week.   She is taking medications regularly.    Ada has a complicated history that includes Tom-Danlos syndrome, history of hypothyroidism, obesity, POTS, HTN, failed " "back syndrome, lumbar and cervical radiculopathy, and chronic headaches     She did meet with complex HA Clinic, including PMR, Neuro, PharmD for intractable HA.  Also saw new Pain doctor, ordered new MRI to evaluate Chiari and considering occipital blocks. Rec craniosacral PT.    Cut out flexeril, methocarbamol, celebrex, reduced topiramate and phentermine.  HA may be minimally improved.  She started HRT and states she feels more \"normal\". More energy. Able to accomplish laundry and closet organizers.                    Objective           Vitals:  No vitals were obtained today due to virtual visit.    Physical Exam   GENERAL: alert and no distress  EYES: Eyes grossly normal to inspection.  No discharge or erythema, or obvious scleral/conjunctival abnormalities.  RESP: No audible wheeze, cough, or visible cyanosis.    SKIN: Visible skin clear. No significant rash, abnormal pigmentation or lesions.  NEURO: Cranial nerves grossly intact.  Mentation and speech appropriate for age.  PSYCH: Appropriate affect, tone, and pace of words          Video-Visit Details    Type of service:  Video Visit   Video End Time:9:49 AM  Originating Location (pt. Location): Home    Distant Location (provider location):  Off-site  Platform used for Video Visit: Sobeida  Signed Electronically by: Ellyn Rivera MD    "

## 2025-05-27 NOTE — LETTER
5/27/2025       RE: Ada Welch  [address] [facility] [address]     Dear Colleague,    Thank you for referring your patient, Ada Welch, to the [facility] NEUROLOGY [facility] [place] at [facility]. Please see a copy of my visit note below.    Virtual Visit Details    Type of service:  Video Visit   Originating Location (pt. Location): Home    Distant Location (provider location):  on site  Platform used for Video Visit: [facility] HEADACHE CLINIC [place]  COMPREHENSIVE INTERDISCIPLINARY HEADACHE MANAGEMENT PROGRAM   [address]  3RD FLOOR  [facility] [address]  TEL: [phone]                                                            Comprehensive Interdisciplinary Headache Team  Lisbeth StuartD  Standal Seema REILLY PM&R  Bonita Jennings MD Neurology, Headache Specialist    Dear Bjorn ,   We had the pleasure of seeing your patient, Rebekah Caballero at our comprehensive headache clinic on 5/21/2025. As you know, this patient is a 54-year-old old with a long history of migraine starting as episodic before transformed to chronic years ago.    IMPRESSION:   Headache presentation shows a long history of migraine starting as episodic and transforming to chronic years ago and complicated by previous sinus surgery, rhinoplasty, chronic neck and back pain, EDS, TMD, dysautonomia, MCAS, medication overuse, opioid use.  PLAN:   Physical therapy and mental health care (has already established care)  Follow-up with recommendations of Dr. Coronado from pain clinic-physical therapy, starting tizanidine to replace cyclobenzaprine, topiramate dose reduction.  Continue with as needed rizatriptan, Ubrelvy and metoclopramide  Vyepti -patient is opened to any trial to help with migraines.  Return to clinic after 2 rounds of Vyepti if approved -approximately 6 months.       19 minutes spent on the date of the encounter  doing video access, chart  review, treatment plan review, documentation and further activities as noted above      Fara KITCHEN, JOSIAS  Comprehensive Headache Clinic/Neurology  [phone]              Again, thank you for allowing me to participate in the care of your patient.      Sincerely,    FIDELINA Ridley CNP

## 2025-05-27 NOTE — PATIENT INSTRUCTIONS
Thank you for visiting the Primary Care Center today at the [facility]! The following is some information about our clinic:     Primary Care Center Frequently-Asked Questions    (1) How do I schedule appointments at the [facility]?     Primary Care--to schedule or make changes to an existing appointment, please call our primary care line at [phone].    Labs--to schedule a lab appointment at the [facility] you can use GoInstant or call [phone]. If you have a [place] location that is closer to home, you can reach out to that location for scheduling options.     Imaging--if you need to schedule a CT, X-ray, MRI, ultrasound, or other imaging study you can call [phone] to schedule at the [facility] or any other [facility] imaging location.     Referrals--if a referral to another specialty was ordered you can expect a phone call from their scheduling team. If you have not heard from them in a week, please call us or send us a GoInstant message to check the status or get a scheduling number. Please note that this only applies to internal [facility] referrals. If the referral is external you would need to contact their office for scheduling.     (2) I have a question about my visit, who do I contact?     You can call us at the primary care line at [phone] to ask questions about your visit. You can also send a secure message through GoInstant, which is reviewed by clinic staff. Please note that GoInstant messages have a twenty-four to forty-eight business hour turnaround time and should not be used for urgent concerns.    (3) How will I get the results of my tests?    If you are signed up for [employer] all tests will be released to you within twenty-four hours of resulting. Please allow three to five days for your doctor to review your results and place a note interpreting the results. If you do not have [employer] you will receive your  results through mail seven to ten business days following the return of the tests. Please note that if there should be any urgent or concerning results that your doctor or their registered nurse will reach out to you the same day as the tests come back. If you have follow up questions about your results or would like to discuss the results in detail please schedule a follow up with your provider either in person or virtually.     (4) How do I get refills of my prescriptions?     You should always first contact your pharmacy for refills of your medications. If submitting a refill request on [employer], please be sure to submit the request only once--repeat requests can cause delays in refill. If you are requesting a NEW medication or a medication related to new symptoms you will need to schedule an appointment with a provider prior to approval. Please note: Routine medication refills have up to one to three business day turnaround whereas controlled substances refills have up to five to seven business day turnaround.    (5) I have new symptoms, what do I do?     If you are having an immediate medical emergency, you should dial 911 for assistance.   For anything urgent that needs to be seen within a few hours to one day you should visit a local urgent care for assistance.  For non-urgent symptoms that need to be seen within a few days to a week you can schedule with an available provider in primary care by going to Speak[employer]it or calling [phone].   If you are not sure how serious your symptoms are or you would like to receive medical advice you can always call [phone] to speak with a triage nurse.

## 2025-05-27 NOTE — PROGRESS NOTES
Virtual Visit Details    Type of service:  Video Visit   Originating Location (pt. Location): Home    Distant Location (provider location):  on site  Platform used for Video Visit: Sobeida

## 2025-05-29 ENCOUNTER — VIRTUAL VISIT (OUTPATIENT)
Dept: PALLIATIVE MEDICINE | Facility: OTHER | Age: 55
End: 2025-05-29
Payer: COMMERCIAL

## 2025-05-29 ENCOUNTER — TELEPHONE (OUTPATIENT)
Dept: INTERNAL MEDICINE | Facility: CLINIC | Age: 55
End: 2025-05-29
Payer: COMMERCIAL

## 2025-05-29 DIAGNOSIS — M96.1 POSTLAMINECTOMY SYNDROME: ICD-10-CM

## 2025-05-29 DIAGNOSIS — G89.4 CHRONIC PAIN SYNDROME: ICD-10-CM

## 2025-05-29 DIAGNOSIS — F43.23 ADJUSTMENT DISORDER WITH MIXED ANXIETY AND DEPRESSED MOOD: Primary | ICD-10-CM

## 2025-05-29 PROCEDURE — 90834 PSYTX W PT 45 MINUTES: CPT | Mod: 95 | Performed by: SOCIAL WORKER

## 2025-05-29 RX ORDER — TRAMADOL HYDROCHLORIDE 50 MG/1
50 TABLET ORAL EVERY 6 HOURS PRN
Qty: 65 TABLET | Refills: 0 | OUTPATIENT
Start: 2025-05-29

## 2025-05-29 NOTE — TELEPHONE ENCOUNTER
Patient confirmed scheduled appointment:  Date: 7/8  Time: 9:15am  Visit type: Video Return   Provider: PCP  Location: [facility]  Additional notes: per check out - Return in about 6 weeks (around 7/8/2025) for with me, in person prefer in person but virtual is acceptable

## 2025-06-03 NOTE — PROGRESS NOTES
[facility]                                    Progress Note    Patient Name: Ada Welch  Date: 5/29/25         Service Type: Individual      Session Start Time:   1310  Session End Time:  1400     Session Length: 50     Session #:   3    Attendees: Client attended alone    Service Modality:  Video Visit:      Provider verified identity through the following two step process.  Patient provided:  Patient is known previously to provider    Telemedicine Visit: The patient's condition can be safely assessed and treated via synchronous audio and visual telemedicine encounter.      Reason for Telemedicine Visit: Patient has requested telehealth visit and Services only offered telehealth    Originating Site (Patient Location): Patient's home    Distant Site (Provider Location): Provider Remote Setting- Home Office    Consent:  The patient/guardian has verbally consented to: the potential risks and benefits of telemedicine (video visit) versus in person care; bill my insurance or make self-payment for services provided; and responsibility for payment of non-covered services.     Patient would like the video invitation sent by:  My Chart    Mode of Communication:  Video Conference via Amwell    Distant Location (Provider):  Off-site    As the provider I attest to compliance with applicable laws and regulations related to telemedicine.    DATA  Extended Session (53+ minutes): No  Interactive Complexity: No  Crisis: No         Progress Since Last Session (Related to Symptoms / Goals / Homework):   Symptoms: patient continues to struggle with significant health concerns/pain that greatly impacts mental health symptoms    Homework: Partially completed      Episode of Care Goals: Minimal progress - PREPARATION (Decided to change - considering how); Intervened by negotiating a change plan and determining options / strategies for behavior change, identifying triggers, exploring social supports, and  working towards setting a date to begin behavior change     Current / Ongoing Stressors and Concerns:   Processed recent visit with Dr. Coronado and current health concerns.  Processed fears of new diagnosis and what that means for her.  Discussed self-care and focusing on the present along with what she can control rather than what she cannot control.  Processed the stressors w/ family and adjustment to being  and [facility].  Worked on cognitive restructuring skills and setting health boundaries.     Treatment Objective(s) Addressed in This Session:   follow up with recommended medical treatments and appointments with Pain Center provider     Intervention:   CBT: cognitive restructuring, self-care, boundaries    Assessments completed prior to visit:  The following assessments were completed by patient for this visit:  PHQ9:       8/15/2017     7:36 AM 9/21/2017     4:59 PM 10/23/2019     8:01 AM 1/3/2020     8:38 AM 11/13/2020    11:38 AM 10/31/2024    10:41 AM 4/8/2025     3:52 PM   PHQ-9 SCORE   PHQ-9 Total Score MyChart 0 0  2 (Minimal depression)   9 (Mild depression)   PHQ-9 Total Score 0  0  0 2 8 8 9        Patient-reported    Data saved with a previous flowsheet row definition     GAD7:       1/22/2020     2:47 PM 11/13/2020    11:38 AM 8/5/2021     6:53 AM 7/12/2022     9:03 AM 10/31/2024    10:41 AM 4/8/2025     3:53 PM 5/21/2025     2:30 PM   SAMANTHA-7 SCORE   Total Score 1 (minimal anxiety)  0 (minimal anxiety) 1 (minimal anxiety)  1 (minimal anxiety) 1 (minimal anxiety)   Total Score 1 0 0 1 2 1  1        Patient-reported     PROMIS 10-Global Health (only subscores and total score):       8/29/2016     1:38 AM 9/2/2016     2:52 PM 4/26/2018    10:46 AM 7/10/2018     8:45 PM 9/20/2022    10:03 PM 4/8/2025     4:41 PM   PROMIS-10 Scores Only   Global Mental Health Score   11 13 15 13    Global Physical Health Score  8  7 6 8 9    PROMIS TOTAL - SUBSCORES   18 19 23 22    Global Physical  Health Score : Raw Score 5 (SUM : G03 - G06 - G07 - G08)        Global Mental Health Score : Raw Score 9 (SUM : G02 - G04 - G05 - G10)        Total (Physical + Mental Health Score) 14            Patient-reported    Data saved with a previous flowsheet row definition     [place] Suicide Severity Rating Scale (Lifetime/Recent)      12/17/2018     9:02 AM 3/3/2019     5:56 PM 6/23/2024     6:34 PM 7/2/2024    10:26 AM 4/9/2025     4:00 PM   [place] Suicide Severity Rating (Lifetime/Recent)   Q1 Wish to be Dead (Lifetime)     No   Q2 Non-Specific Active Suicidal Thoughts (Lifetime)     No   Q1 Wished to be Dead (Past Month) no  no  0-->no 0-->no 0-->no   Q2 Suicidal Thoughts (Past Month) no  no  0-->no 0-->no 0-->no   Q6 Suicide Behavior (Lifetime) no  no  0-->no 0-->no 0-->no   Level of Risk per Screen   no risks indicated no risks indicated no risks indicated       Data saved with a previous flowsheet row definition         ASSESSMENT: Current Emotional / Mental Status (status of significant symptoms):   Risk status (Self / Other harm or suicidal ideation)   Patient denies current fears or concerns for personal safety.   Patient denies current or recent suicidal ideation or behaviors.   Patient denies current or recent homicidal ideation or behaviors.   Patient denies current or recent self injurious behavior or ideation.   Patient denies other safety concerns.   Patient reports there has been no change in risk factors since their last session.     Patient reports there has been no change in protective factors since their last session.     Recommended that patient call 911 or go to the local ED should there be a change in any of these risk factors     Appearance:   Appropriate    Eye Contact:   Good    Psychomotor Behavior: Normal    Attitude:   Cooperative    Orientation:   All   Speech    Rate / Production: Normal     Volume:  Normal    Mood:    Depressed    Affect:    Appropriate    Thought Content:  Clear     Thought Form:  Coherent  Logical    Insight:    Good      Medication Review:   No changes to current psychiatric medication(s)     Medication Compliance:   Yes     Changes in Health Issues:   None reported     Chemical Use Review:   Substance Use: Chemical use reviewed, no active concerns identified      Tobacco Use: No current tobacco use.      Diagnosis:  1. Adjustment disorder with mixed anxiety and depressed mood    2. Chronic pain syndrome      Rule out- PTSD    Collateral Reports Completed:   Not Applicable    PLAN: (Patient Tasks / Therapist Tasks / Other)     Follow up with DR. Coronado as scheduled  Set boundaries with friends/family  Practice self-care strategies as discussed  Follow up with Arlyn in 2-4 weeks    Padmini Gomez, [facility], [facility]                                                Individual Treatment Plan    Patient's Name: Ada Welch  YOB: 1970    Date of Creation: 5/29/25  Date Treatment Plan Last Reviewed/Revised:      DSM5 Diagnoses: Adjustment Disorders  309.28 (F43.23) With mixed anxiety and depressed mood;  Chronic Pain Syndrome  Rule Out- PTSD  Psychosocial / Contextual Factors:  Medical complexities, Trauma history, Relationship concerns, Limited social supports, Financial strain, Legal involvement.  Cultural and Contextual Factors: Pt is a 54 year old, ,  woman with 4 adult children and on teenage daughter w/ health concerns, lives independently, no learning disability, in the process of divorce, english primary language, Sikh background.     PROMIS 10-Global Health (only subscores and total score):        8/29/2016     1:38 AM 9/2/2016     2:52 PM 4/26/2018    10:46 AM 7/10/2018     8:45 PM 9/20/2022    10:03 PM 4/8/2025     4:41 PM   PROMIS-10 Scores Only   Global Mental Health Score     11 13 15 13    Global Physical Health Score   8 7 6 8 9    PROMIS TOTAL - SUBSCORES     18 19 23 22    Global Physical Health Score : Raw Score 5 (SUM : G03 -  G06 - G07 - G08)             Global Mental Health Score : Raw Score 9 (SUM : G02 - G04 - G05 - G10)             Total (Physical + Mental Health Score) 14                     Referral / Collaboration:  Referral to another professional/service is not indicated at this time..    Anticipated number of session for this episode of care: 26+  Anticipation frequency of session: Biweekly  Anticipated Duration of each session: 38-52 minutes  Treatment plan will be reviewed in 90 days or when goals have been changed.       MeasurableTreatment Goal(s) related to diagnosis / functional impairment(s)  Goal 1: Patient will reduce PHQ-9 and SAMANTHA-7 scores to 0    I will know I've met my goal when I have more tools to help me cope.      Objective #A (Patient Action)    Patient will identify   stressors which contribute to feelings of depression, identify   stressors which contribute to feelings of anxiety, participate in   activities to improve mood, identify   strategies to more effectively address stressors, focus on consistent sleep, eating, movement habits to support regulation, read   to increase education about mental health symptoms & how they manifest for patient (emotion recognition), identify  available supports, identify & practice   effective responses to stressors, engage in   enjoyable activities daily, take all medication as prescribed, identify   effective alternatives to maladaptive coping strategies , journal   steps towards progress each day, and learn and practice mindfulness techniques 3 days per week.    Status: New - Date: 5/29/25     Intervention(s)  Therapist will assign homework  , provide educational materials on depression, anxiety , teach about healthy boundaries.  , teach CBT strategies, encourage behavioral activation, teach sleep hygiene, teach interpersonal effectiveness skills, teach distress tolerance skills, teach mindfulness techniques , use motivational interviewing strategies, provide  psychoeducation           Goal 2: Patient will improve coping skills to manage chronic pain/chronic health conditions    I will know I've met my goal when pain does not control my whole life.      Objective #A (Patient Action)    Status: New - Date: 5/29/25     Patient will identify   strategies for managing pain, learn   facts about chronic pain, practice activity pacing days/week, practice relaxation training 3 times/week, follow up with recommended medical treatments and appointments with all providers, decrease depression/anxiety from 9to 0 associated with chronic pain, improve quality of life/living with pain by  , and use  cognitive restructuring strategies around movement as bad/harmful.    Intervention(s)  Therapist will assign homework  , provide educational materials on chronic pain, teach distraction skills.  , teach gate-control theory of pain, demonstrate   strategies to manage pain, teach mindfulness techniques , use motivational interviewing strategies, recommend apps that support activity pacing  , provide psychoeducation.           Patient has reviewed and agreed to the above plan.      Padmini Gomez, [facility], [facility]  May 29 2025

## 2025-06-04 ENCOUNTER — VIRTUAL VISIT (OUTPATIENT)
Dept: PHARMACY | Facility: CLINIC | Age: 55
End: 2025-06-04
Attending: NURSE PRACTITIONER
Payer: COMMERCIAL

## 2025-06-04 ENCOUNTER — TELEPHONE (OUTPATIENT)
Dept: PHARMACY | Facility: CLINIC | Age: 55
End: 2025-06-04
Payer: COMMERCIAL

## 2025-06-04 DIAGNOSIS — R51.9 CHRONIC INTRACTABLE HEADACHE, UNSPECIFIED HEADACHE TYPE: Primary | ICD-10-CM

## 2025-06-04 DIAGNOSIS — G43.719 INTRACTABLE CHRONIC MIGRAINE WITHOUT AURA AND WITHOUT STATUS MIGRAINOSUS: Primary | ICD-10-CM

## 2025-06-04 DIAGNOSIS — G89.29 CHRONIC INTRACTABLE HEADACHE, UNSPECIFIED HEADACHE TYPE: Primary | ICD-10-CM

## 2025-06-04 RX ORDER — METHYLPREDNISOLONE SODIUM SUCCINATE 40 MG/ML
40 INJECTION INTRAMUSCULAR; INTRAVENOUS
Start: 2025-06-11

## 2025-06-04 RX ORDER — HEPARIN SODIUM (PORCINE) LOCK FLUSH IV SOLN 100 UNIT/ML 100 UNIT/ML
5 SOLUTION INTRAVENOUS
OUTPATIENT
Start: 2025-06-11

## 2025-06-04 RX ORDER — EPINEPHRINE 1 MG/ML
0.3 INJECTION, SOLUTION, CONCENTRATE INTRAVENOUS EVERY 5 MIN PRN
OUTPATIENT
Start: 2025-06-11

## 2025-06-04 RX ORDER — DIPHENHYDRAMINE HYDROCHLORIDE 50 MG/ML
50 INJECTION, SOLUTION INTRAMUSCULAR; INTRAVENOUS
Start: 2025-06-11

## 2025-06-04 RX ORDER — DIPHENHYDRAMINE HYDROCHLORIDE 50 MG/ML
25 INJECTION, SOLUTION INTRAMUSCULAR; INTRAVENOUS
Start: 2025-06-11

## 2025-06-04 RX ORDER — ALBUTEROL SULFATE 0.83 MG/ML
2.5 SOLUTION RESPIRATORY (INHALATION)
OUTPATIENT
Start: 2025-06-11

## 2025-06-04 RX ORDER — HEPARIN SODIUM,PORCINE 10 UNIT/ML
5-20 VIAL (ML) INTRAVENOUS DAILY PRN
OUTPATIENT
Start: 2025-06-11

## 2025-06-04 RX ORDER — ALBUTEROL SULFATE 90 UG/1
1-2 INHALANT RESPIRATORY (INHALATION)
Start: 2025-06-11

## 2025-06-04 RX ORDER — MEPERIDINE HYDROCHLORIDE 25 MG/ML
25 INJECTION INTRAMUSCULAR; INTRAVENOUS; SUBCUTANEOUS
OUTPATIENT
Start: 2025-06-11

## 2025-06-04 NOTE — Clinical Note
Hello - I met with patient today to discuss starting Vyepti for migraines after [facility] two weeks ago. Dr. Nguyen you have a follow-up with patient in about 2 weeks. Thanks!

## 2025-06-04 NOTE — PATIENT INSTRUCTIONS
"Recommendations from today's MTM visit:                                                       Plan to start Vyepti 100mg infusions every 90 days   Possible side effects: infusion reaction (usually in the first 24 hours), scratchy throat, and runny nose   Please call the [facility] ([phone]) to schedule your infusion appointment in a week if scheduling has not contacted you. If you are eligible for [facility]), they will contact you to transition.    Your next Botox injection is scheduled for 6/17 with Dr. Nguyen     Follow-up: 6-8 weeks after your first Vyepti infusion     It was great speaking with you today.  I value your experience and would be very thankful for your time in providing feedback in our clinic survey. In the next few days, you may receive an email or text message from [facility] with a link to a survey related to your  clinical pharmacist.\"     To schedule another MTM appointment, please call the clinic directly or you may call the MTM scheduling line at [phone] or toll-free at [phone].     My Clinical Pharmacist's contact information:                                                      Please feel free to contact me with any questions or concerns you have.      Lisbeth Blake, PharmD, BCACP  Medication Therapy Management Pharmacist   [facility]     "

## 2025-06-04 NOTE — PROGRESS NOTES
Medication Therapy Management (MTM) Encounter    ASSESSMENT:                            Medication Adherence/Access: No issues identified.    Headache   (Migraine):   Given the number of migraines and the severity of migraines, patient may benefit from additional preventative treatment. After [facility], it has been recommended to try Vyepti infusions. Reviewed medication dosing, mechanism, time to expected benefit, possible side effects, and infusion process. Patient would prefer to infuse at the [facility]. Therapy plan has been ordered and ambulatory care prescription signed. Recommend to continue with as needed rizatriptan, Ubrelvy, and metoclopramide for treatment of migraines.     PLAN:                            Plan to start Vyepti 100mg infusions every 90 days   Possible side effects: infusion reaction (usually in the first 24 hours), scratchy throat, and runny nose   Please call the [facility] ([phone]) to schedule your infusion appointment in a week if scheduling has not contacted you. If you are eligible for [facility] ([facility]), they will contact you to transition.    Your next Botox injection is scheduled for 6/17 with Dr. Nguyen     Follow-up: 6-8 weeks after your first Vyepti infusion     SUBJECTIVE/OBJECTIVE:                          Aad Welch is a 54 year old female seen for a follow-up visit.       Reason for visit: Vyepti discussion.    Allergies/ADRs: Reviewed in chart  Past Medical History: Reviewed in chart  Tobacco: She reports that she quit smoking about 11 years ago. Her smoking use included cigarettes. She started smoking about 34 years ago. She has a 4.6 pack-year smoking history. She has been exposed to tobacco smoke. She has never used smokeless tobacco.  Alcohol: not currently using  THC/CBD: Medical cannabis - has tablets that she will take daily. 142.5mg THC and 7.5mg CBD  Also uses a THC balm that is applied to neck and back   Medication  Adherence/Access: no issues reported.    Headache   (Migraine)  Preventive medications  Botox every 3 months - last given 3/25/25, sometimes is helpful but last dose was not   Topiramate 50mg - this was reduced about a week and a half ago and seems to be going okay. Pain management is working to reduce this medication because of the memory concerns she has been experience  Propranolol  mg daily (taking for autonomic dysfunction)    Acute medications  Sumatriptan 6 mg subcutaneously at migraine onset- saved for a last resort rescue medication and will usually take once weekly   Rizatriptan 5-10 mg at migraine onset - usually about three times weekly   Ubrelvy 100 mg at migraine onset - usually about three times weekly   Metoclopramide 10 mg 4 times daily as needed - for nausea but doesn't notice if helpful for migraines     Will either take rizatriptan or ubrelvy, does not take on the same day.     She had Botox 8 weeks ago and then underwent a MRI the next day. Since these two events she has had a daily migraine and can barely function. Next Botox injection will be 6/17 with Dr. Nguyen    She hasn't been able to read a book in over a year and a half     Associated symptoms include: Vision distortion, nausea, photophobia     Past medications tried/failed: Amitriptyline (caused weight gain), zonisamide (unsure why it didn't work), gabapentin and Lyrica (decreased her BP and HR), depakote (used for back pain and unsure if helpful), DHE (worked but infusions were a lot and made her throw up), Fioricet (worked)     CGRPs have not been affordable in the past  - Emgality and Aimovig were prescribed but could not afford so never started     She experiences 30/30 headache days per month and 30/30 are severe headaches.   These are daily and it takes almost half the day to get rid of the migraine. Last year and a half have been the worst and pain is 9.5/10 if she is active. Laying down helps reduce to 7/10, will need to  lay down for many hours. Always been at 6-7/10 for pain   If she is standing up right for a long time during the day she is unable to move legs at the end of the day     Associated symptoms: nausea, vomiting, photophobia, and phonophobia     Nonpharmacologic: She is will try Ice caps and hot compresses, Water with lots of electrolytes, meditation, Essential oils, TENS unit, Epson salt bath          Today's Vitals: LMP  (LMP Unknown)   ----------------    I spent 26 minutes with this patient today. I offer these suggestions for consideration by FIDELINA Carlisle, CNP.     A summary of these recommendations was sent via FoneSense.    Lisbeth Blake, PharmD, BCACP  Medication Therapy Management Pharmacist   [facility]      Telemedicine Visit Details  The patient's medications can be safely assessed via a telemedicine encounter.  Type of service:  Video Conference via AQH  Originating Location (pt. Location): Home  Distant Location (provider location):  Off-site  Start Time: 3:41 PM  End Time: 4:07 PM     Medication Therapy Recommendations  No medication therapy recommendations to display

## 2025-06-04 NOTE — TELEPHONE ENCOUNTER
Patient to start Vyepti with plans of infusing at [facility]. Pending therapy plan to FIDELINA Carlisle, CNP to review and sign.     Lisbeth Blake, PharmD, BCACP  Medication Therapy Management Pharmacist   [facility]

## 2025-06-09 ENCOUNTER — TELEPHONE (OUTPATIENT)
Dept: NEUROLOGY | Facility: CLINIC | Age: 55
End: 2025-06-09
Payer: COMMERCIAL

## 2025-06-09 NOTE — TELEPHONE ENCOUNTER
LVM relaying message below. Confirmed that I have cancelled the appointment on her behalf due to not receiving approval yet. Clinic # provided.

## 2025-06-09 NOTE — TELEPHONE ENCOUNTER
----- Message from Fara Varner sent at 6/9/2025  9:36 AM CDT -----  Regarding: RE: Urgency of procedure on pending BCBS PA  Thank you for letting us know.   Darlene or Rebekah, Please let patient know that crow needs to be post pone until coverage is confirmed.   Lisbeth, what else we can suggest? I've not seen this patient so I don't know what would be the best-any oral pills-effexor? Verapamil?Emgality [facility] is qualified?    Thank you,  Fara  ----- Message -----  From: Rajan Erwin  Sent: 6/9/2025   8:17 AM CDT  To: FIDELINA Ridley CNP; April#  Subject: Urgency of procedure on pending BCBS PA          We are preparing for our financially unsecured huddle with the service line and clinic leaders and unfortunately, treatment for your patient is still unsecure at this time with a low likelihood of approval from the payor.      We would like to request that you make one of the two following decisions:     1.) Postpone appointment if coverage is still pending 24 hours prior to appt   - The coverage status can found be found by reviewing the plan auth status or the associated referral. If you are still unsure, you can reach out via inbasket to [facility]   - A member of the care team would need to inform patient and have appointment cancelled and rescheduled   - We recommend rescheduling no less than 7 days from today as this is the expected turn around time from the payor to make a determination      2.) If treatment is emergent and cannot be postponed, we can escalate this to the service line via the financially unsecured escalation huddle for approval to proceed    - We encourage a member of the care team be present on the huddle to discuss     - If the outcome of the huddle escalation is to postpone the appointment, you will be notified by the service line or your clinic leader        Definition of Emergent:  Delaying care will result in   - Placing the  health of the individual (or with respect to a pregnant woman, the health of her unborn child) in serious jeopardy,  - Serious impairment to bodily functions or,  - Serious dysfunction of any bodily organ or part     Based on the information provided, please let us know if it is the care team's decision to postpone if coverage is still not secure 24 hours prior to the appt, or would you like us to escalate to service line for review on the huddle?     Raf Erwin  Prior Authorization [facility]  [id number] [place] [address] [place] [address]  [email or web address]  p: [phone] f: [phone]

## 2025-06-13 ENCOUNTER — VIRTUAL VISIT (OUTPATIENT)
Dept: PALLIATIVE MEDICINE | Facility: OTHER | Age: 55
End: 2025-06-13
Payer: COMMERCIAL

## 2025-06-13 DIAGNOSIS — F43.23 ADJUSTMENT DISORDER WITH MIXED ANXIETY AND DEPRESSED MOOD: Primary | ICD-10-CM

## 2025-06-13 DIAGNOSIS — G89.4 CHRONIC PAIN SYNDROME: ICD-10-CM

## 2025-06-13 PROCEDURE — 90837 PSYTX W PT 60 MINUTES: CPT | Mod: 95 | Performed by: SOCIAL WORKER

## 2025-06-16 DIAGNOSIS — M79.605 LOW BACK PAIN RADIATING TO BOTH LEGS: Primary | ICD-10-CM

## 2025-06-16 DIAGNOSIS — M79.604 LOW BACK PAIN RADIATING TO BOTH LEGS: Primary | ICD-10-CM

## 2025-06-16 DIAGNOSIS — M54.50 LOW BACK PAIN RADIATING TO BOTH LEGS: Primary | ICD-10-CM

## 2025-06-16 RX ORDER — HYDROMORPHONE HYDROCHLORIDE 2 MG/1
2 TABLET ORAL EVERY 6 HOURS PRN
Qty: 15 TABLET | Refills: 0 | Status: SHIPPED | OUTPATIENT
Start: 2025-06-16 | End: 2025-06-19

## 2025-06-18 NOTE — PROGRESS NOTES
[facility]                                    Progress Note    Patient Name: Ada Welch  Date: 6/13/25         Service Type: Individual      Session Start Time:   1100  Session End Time:   1155     Session Length:  55    Session #:   4    Attendees: Client attended alone    Service Modality:  Video Visit:      Provider verified identity through the following two step process.  Patient provided:  Patient is known previously to provider    Telemedicine Visit: The patient's condition can be safely assessed and treated via synchronous audio and visual telemedicine encounter.      Reason for Telemedicine Visit: Patient has requested telehealth visit and Services only offered telehealth    Originating Site (Patient Location): Patient's home    Distant Site (Provider Location): Provider Remote Setting- Home Office    Consent:  The patient/guardian has verbally consented to: the potential risks and benefits of telemedicine (video visit) versus in person care; bill my insurance or make self-payment for services provided; and responsibility for payment of non-covered services.     Patient would like the video invitation sent by:  My Chart    Mode of Communication:  Video Conference via [employer]    Distant Location (Provider):  Off-site    As the provider I attest to compliance with applicable laws and regulations related to telemedicine.    DATA  Extended Session (53+ minutes):   - High distress and under complex circumstances.  See Data section for details  Interactive Complexity: No  Crisis: No         Progress Since Last Session (Related to Symptoms / Goals / Homework):   Symptoms: Patient continues to struggle with significant health concerns that greatly impact mental health.     Homework: Partially completed      Episode of Care Goals: Satisfactory progress - ACTION (Actively working towards change); Intervened by reinforcing change plan / affirming steps taken     Current / Ongoing Stressors  and Concerns:    Processed patients physical health/pain levels that continue to increase and is becoming more and more difficult for patient to function throughout her day.  Pt processed her fears of asking for help from providers due to her long hx of health issues & dealing with medical providers, appears to have a lot of medical trauma.  Discussed self-care and ability to ask for help.  Processed the multiple changes going on in her life and marital hx, with coming to terms with ex-husbands true character and letting go of her expectations.   Processed her martial hx and also a lot of trauma within that relationship.      Treatment Objective(s) Addressed in This Session:   identify 25 strategies for managing pain and follow up with recommended medical treatments and appointments with Pain Center provider, Increase interest, engagement, and pleasure in doing things  Identify negative self-talk and behaviors: challenge core beliefs, myths, and actions     Intervention:   CBT: cognitive restructuring, self-care, boundaries    Assessments completed prior to visit:  The following assessments were completed by patient for this visit:  PHQ9:       8/15/2017     7:36 AM 9/21/2017     4:59 PM 10/23/2019     8:01 AM 1/3/2020     8:38 AM 11/13/2020    11:38 AM 10/31/2024    10:41 AM 4/8/2025     3:52 PM   PHQ-9 SCORE   PHQ-9 Total Score MyChart 0 0  2 (Minimal depression)   9 (Mild depression)   PHQ-9 Total Score 0  0  0 2 8 8 9        Patient-reported    Data saved with a previous flowsheet row definition     GAD7:       1/22/2020     2:47 PM 11/13/2020    11:38 AM 8/5/2021     6:53 AM 7/12/2022     9:03 AM 10/31/2024    10:41 AM 4/8/2025     3:53 PM 5/21/2025     2:30 PM   SAMANTHA-7 SCORE   Total Score 1 (minimal anxiety)  0 (minimal anxiety) 1 (minimal anxiety)  1 (minimal anxiety) 1 (minimal anxiety)   Total Score 1 0 0 1 2 1  1        Patient-reported     PROMIS 10-Global Health (only subscores and total score):        8/29/2016     1:38 AM 9/2/2016     2:52 PM 4/26/2018    10:46 AM 7/10/2018     8:45 PM 9/20/2022    10:03 PM 4/8/2025     4:41 PM   PROMIS-10 Scores Only   Global Mental Health Score   11 13 15 13    Global Physical Health Score  8  7 6 8 9    PROMIS TOTAL - SUBSCORES   18 19 23 22    Global Physical Health Score : Raw Score 5 (SUM : G03 - G06 - G07 - G08)        Global Mental Health Score : Raw Score 9 (SUM : G02 - G04 - G05 - G10)        Total (Physical + Mental Health Score) 14            Patient-reported    Data saved with a previous flowsheet row definition     [place] Suicide Severity Rating Scale (Lifetime/Recent)      12/17/2018     9:02 AM 3/3/2019     5:56 PM 6/23/2024     6:34 PM 7/2/2024    10:26 AM 4/9/2025     4:00 PM   [place] Suicide Severity Rating (Lifetime/Recent)   Q1 Wish to be Dead (Lifetime)     No   Q2 Non-Specific Active Suicidal Thoughts (Lifetime)     No   Q1 Wished to be Dead (Past Month) no  no  0-->no 0-->no 0-->no   Q2 Suicidal Thoughts (Past Month) no  no  0-->no 0-->no 0-->no   Q6 Suicide Behavior (Lifetime) no  no  0-->no 0-->no 0-->no   Level of Risk per Screen   no risks indicated no risks indicated no risks indicated       Data saved with a previous flowsheet row definition         ASSESSMENT: Current Emotional / Mental Status (status of significant symptoms):   Risk status (Self / Other harm or suicidal ideation)   Patient denies current fears or concerns for personal safety.   Patient denies current or recent suicidal ideation or behaviors.   Patient denies current or recent homicidal ideation or behaviors.   Patient denies current or recent self injurious behavior or ideation.   Patient denies other safety concerns.   Patient reports there has been no change in risk factors since their last session.     Patient reports there has been no change in protective factors since their last session.     Recommended that patient call 911 or go to the local ED should there be a change  in any of these risk factors     Appearance:   Appropriate    Eye Contact:   Good    Psychomotor Behavior: Normal    Attitude:   Cooperative    Orientation:   All   Speech    Rate / Production: Normal     Volume:  Normal    Mood:    Depressed    Affect:    Appropriate    Thought Content:  Clear    Thought Form:  Coherent  Logical    Insight:    Good      Medication Review:   No changes to current psychiatric medication(s)     Medication Compliance:   Yes     Changes in Health Issues:   Yes: Pain, Associated Psychological Distress     Chemical Use Review:   Substance Use: Chemical use reviewed, no active concerns identified      Tobacco Use: No current tobacco use.      Diagnosis:  1. Adjustment disorder with mixed anxiety and depressed mood    2. Chronic pain syndrome      Rule out- PTSD    Collateral Reports Completed:   Not Applicable    PLAN: (Patient Tasks / Therapist Tasks / Other)     Follow up with DR. Coronado as scheduled  Set boundaries with friends/family  Practice self-care strategies as discussed  Follow up with Arlyn in 2-4 weeks    JUAN Barbosa, [facility]                                                Individual Treatment Plan    Patient's Name: Ada Welch  YOB: 1970    Date of Creation: 5/29/25  Date Treatment Plan Last Reviewed/Revised:      DSM5 Diagnoses: Adjustment Disorders  309.28 (F43.23) With mixed anxiety and depressed mood;  Chronic Pain Syndrome  Rule Out- PTSD  Psychosocial / Contextual Factors:  Medical complexities, Trauma history, Relationship concerns, Limited social supports, Financial strain, Legal involvement.  Cultural and Contextual Factors: Pt is a 54 year old, ,  woman with 4 adult children and on teenage daughter w/ health concerns, lives independently, no learning disability, in the process of divorce, english primary language, Zoroastrian background.     PROMIS 10-Global Health (only subscores and total score):        8/29/2016     1:38  AM 9/2/2016     2:52 PM 4/26/2018    10:46 AM 7/10/2018     8:45 PM 9/20/2022    10:03 PM 4/8/2025     4:41 PM   PROMIS-10 Scores Only   Global Mental Health Score     11 13 15 13    Global Physical Health Score   8 7 6 8 9    PROMIS TOTAL - SUBSCORES     18 19 23 22    Global Physical Health Score : Raw Score 5 (SUM : G03 - G06 - G07 - G08)             Global Mental Health Score : Raw Score 9 (SUM : G02 - G04 - G05 - G10)             Total (Physical + Mental Health Score) 14                     Referral / Collaboration:  Referral to another professional/service is not indicated at this time..    Anticipated number of session for this episode of care: 26+  Anticipation frequency of session: Biweekly  Anticipated Duration of each session: 38-52 minutes  Treatment plan will be reviewed in 90 days or when goals have been changed.       MeasurableTreatment Goal(s) related to diagnosis / functional impairment(s)  Goal 1: Patient will reduce PHQ-9 and SAMANTHA-7 scores to 0    I will know I've met my goal when I have more tools to help me cope.      Objective #A (Patient Action)    Patient will identify   stressors which contribute to feelings of depression, identify   stressors which contribute to feelings of anxiety, participate in   activities to improve mood, identify   strategies to more effectively address stressors, focus on consistent sleep, eating, movement habits to support regulation, read   to increase education about mental health symptoms & how they manifest for patient (emotion recognition), identify  available supports, identify & practice   effective responses to stressors, engage in   enjoyable activities daily, take all medication as prescribed, identify   effective alternatives to maladaptive coping strategies , journal   steps towards progress each day, and learn and practice mindfulness techniques 3 days per week.    Status: New - Date: 5/29/25     Intervention(s)  Therapist will assign homework  ,  provide educational materials on depression, anxiety , teach about healthy boundaries.  , teach CBT strategies, encourage behavioral activation, teach sleep hygiene, teach interpersonal effectiveness skills, teach distress tolerance skills, teach mindfulness techniques , use motivational interviewing strategies, provide psychoeducation           Goal 2: Patient will improve coping skills to manage chronic pain/chronic health conditions    I will know I've met my goal when pain does not control my whole life.      Objective #A (Patient Action)    Status: New - Date: 5/29/25     Patient will identify   strategies for managing pain, learn   facts about chronic pain, practice activity pacing days/week, practice relaxation training 3 times/week, follow up with recommended medical treatments and appointments with all providers, decrease depression/anxiety from 9to 0 associated with chronic pain, improve quality of life/living with pain by  , and use  cognitive restructuring strategies around movement as bad/harmful.    Intervention(s)  Therapist will assign homework  , provide educational materials on chronic pain, teach distraction skills.  , teach gate-control theory of pain, demonstrate   strategies to manage pain, teach mindfulness techniques , use motivational interviewing strategies, recommend apps that support activity pacing  , provide psychoeducation.           Patient has reviewed and agreed to the above plan.      Padmini Gomez, [facility], [facility]  May 29 2025

## 2025-06-23 ENCOUNTER — HOSPITAL ENCOUNTER (OUTPATIENT)
Dept: MRI IMAGING | Facility: CLINIC | Age: 55
Discharge: HOME | End: 2025-06-23
Attending: ANESTHESIOLOGY | Admitting: ANESTHESIOLOGY
Payer: COMMERCIAL

## 2025-06-23 DIAGNOSIS — G44.86 CERVICOGENIC HEADACHE: ICD-10-CM

## 2025-06-23 PROCEDURE — 70551 MRI BRAIN STEM W/O DYE: CPT

## 2025-06-24 ASSESSMENT — HEADACHE IMPACT TEST (HIT 6)
HOW OFTEN DID HEADACHS LIMIT CONCENTRATION ON WORK OR DAILY ACTIVITY: VERY OFTEN
WHEN YOU HAVE HEADACHES HOW OFTEN IS THE PAIN SEVERE: VERY OFTEN
HOW OFTEN DO HEADACHES LIMIT YOUR DAILY ACTIVITIES: VERY OFTEN
HOW OFTEN HAVE YOU FELT TOO TIRED TO WORK BECAUSE OF YOUR HEADACHES: VERY OFTEN
HIT6 TOTAL SCORE: 68
WHEN YOU HAVE A HEADACHE HOW OFTEN DO YOU WISH YOU COULD LIE DOWN: ALWAYS
HOW OFTEN HAVE YOU FELT FED UP OR IRRITATED BECAUSE OF YOUR HEADACHES: VERY OFTEN

## 2025-06-25 ENCOUNTER — OFFICE VISIT (OUTPATIENT)
Dept: ANESTHESIOLOGY | Facility: CLINIC | Age: 55
End: 2025-06-25
Payer: COMMERCIAL

## 2025-06-25 VITALS
RESPIRATION RATE: 16 BRPM | HEART RATE: 70 BPM | SYSTOLIC BLOOD PRESSURE: 113 MMHG | OXYGEN SATURATION: 100 % | DIASTOLIC BLOOD PRESSURE: 66 MMHG

## 2025-06-25 DIAGNOSIS — M79.18 MYOFASCIAL MUSCLE PAIN: ICD-10-CM

## 2025-06-25 DIAGNOSIS — M54.81 BILATERAL OCCIPITAL NEURALGIA: ICD-10-CM

## 2025-06-25 DIAGNOSIS — G89.29 CHRONIC INTRACTABLE HEADACHE, UNSPECIFIED HEADACHE TYPE: Primary | ICD-10-CM

## 2025-06-25 DIAGNOSIS — R51.9 CHRONIC INTRACTABLE HEADACHE, UNSPECIFIED HEADACHE TYPE: Primary | ICD-10-CM

## 2025-06-25 DIAGNOSIS — M96.1 POSTLAMINECTOMY SYNDROME: ICD-10-CM

## 2025-06-25 PROCEDURE — 3074F SYST BP LT 130 MM HG: CPT | Performed by: ANESTHESIOLOGY

## 2025-06-25 PROCEDURE — 99207 PR NO CHARGE INJECTABLE MED/DRUG: CPT | Performed by: ANESTHESIOLOGY

## 2025-06-25 PROCEDURE — 3078F DIAST BP <80 MM HG: CPT | Performed by: ANESTHESIOLOGY

## 2025-06-25 PROCEDURE — 64450 NJX AA&/STRD OTHER PN/BRANCH: CPT | Mod: 50 | Performed by: ANESTHESIOLOGY

## 2025-06-25 PROCEDURE — 64405 NJX AA&/STRD GR OCPL NRV: CPT | Mod: 50 | Performed by: ANESTHESIOLOGY

## 2025-06-25 PROCEDURE — 1125F AMNT PAIN NOTED PAIN PRSNT: CPT | Performed by: ANESTHESIOLOGY

## 2025-06-25 RX ORDER — BUPRENORPHINE 10 UG/H
PATCH TRANSDERMAL
Qty: 4 PATCH | Refills: 1 | OUTPATIENT
Start: 2025-06-25

## 2025-06-25 RX ORDER — BUPIVACAINE HYDROCHLORIDE 5 MG/ML
10 INJECTION, SOLUTION EPIDURAL; INTRACAUDAL; PERINEURAL ONCE
Status: COMPLETED | OUTPATIENT
Start: 2025-06-25 | End: 2025-06-25

## 2025-06-25 RX ORDER — DIAZEPAM 5 MG/1
5-10 TABLET ORAL
OUTPATIENT
Start: 2025-06-25

## 2025-06-25 RX ADMIN — BUPIVACAINE HYDROCHLORIDE 50 MG: 5 INJECTION, SOLUTION EPIDURAL; INTRACAUDAL; PERINEURAL at 12:24

## 2025-06-25 ASSESSMENT — PAIN SCALES - GENERAL: PAINLEVEL_OUTOF10: SEVERE PAIN (8)

## 2025-06-25 NOTE — NURSING NOTE
RN read through the instructions with the patient for the recommended procedure: Piriformis Injection  Patient verbalized understanding to holding appropriate medication per protocol and was agreeable to NPO policy and needing a .    Anticoagulant: no hold required    Recommended Follow Up:  BHUMI Jernigan RN

## 2025-06-25 NOTE — PROGRESS NOTES
Patient: Ada Welch Age: 54 year old   MRN: [id number] Attending: Dr. Martinez     Date of Visit: June 25, 2025      PAIN MEDICINE CLINIC PROCEDURE NOTE    ATTENDING CLINICIAN:    Faiza Martinez MD    PROCEDURE(S) PERFORMED:  Bilateral greater occipital nerve block   Bilateral lesser occipital nerve block     INDICATIONS:  Ada Welch is a 54 year old female with a history of chronic headache possibly secondary to occipital neuralgia.  The patient states that the patient was in their usual state of health and denied recent anticoagulant use or recent infections.  Therefore, the plan is to perform above mentioned procedure.     Procedure Details:  The patient was met in the exam room, where the patient was identified by name, medical record number and date of birth.  All of the patient s last minute questions were answered. Written informed consent was obtained and saved in the electronic medical record, after the risks, benefits, and alternatives were discussed with the patient.      A formal time-out procedure was performed, as per protocol, including patient name, title of procedure, and site of procedure, and all in the room concurred.  Routine monitors were applied.      The patient was placed in the sitting position on the exam room table. A chlorhexidine prep was completed on area just inferior to insertion of the right and left superior trapezius followed by sterile draping per standard procedure. Using palpation, the area was localized one third the distance between the occipital protuberance and the right mastoid process. After negative aspiration for heme, a 30G 1.5 inch needle was used to inject 1.5 mL of a treatment mixture containing  2 mL of bupivacaine 0.5% in a fan like distribution.  The needle was then removed.  The same procedure was performed on the left side. I then performed lesser occipital nerve block by infiltration of 0.5 ml of above solution in the midway between the greater  occipital nerve and the mastoid process. The same procedure was performed on the left side.    Light pressure was held at the puncture site(s) to prevent ecchymosis and oozing.  The patient's skin was cleansed, and hemostasis was confirmed.      Condition:    The patient remained awake and alert throughout the procedure.  The patient tolerated the procedure well and was monitored for approximately 15 minutes afterward in the post procedure area.  There were no immediate post procedure complications noted.  The patient was then discharged to home as per protocol.

## 2025-06-25 NOTE — LETTER
6/25/2025       RE: Ada Welch  [address] [address]     Dear Colleague,    Thank you for referring your patient, Ada Welch, to the [facility] [place] at [facility]. Please see a copy of my visit note below.    Patient: Ada Welch Age: 54 year old   MRN: [id number] Attending: Dr. Martinez     Date of Visit: June 25, 2025      PAIN MEDICINE CLINIC PROCEDURE NOTE    ATTENDING CLINICIAN:    Faiza Martinez MD    PROCEDURE(S) PERFORMED:  Bilateral greater occipital nerve block   Bilateral lesser occipital nerve block     INDICATIONS:  Ada Welch is a 54 year old female with a history of chronic headache possibly secondary to occipital neuralgia.  The patient states that the patient was in their usual state of health and denied recent anticoagulant use or recent infections.  Therefore, the plan is to perform above mentioned procedure.     Procedure Details:  The patient was met in the exam room, where the patient was identified by name, medical record number and date of birth.  All of the patient s last minute questions were answered. Written informed consent was obtained and saved in the electronic medical record, after the risks, benefits, and alternatives were discussed with the patient.      A formal time-out procedure was performed, as per protocol, including patient name, title of procedure, and site of procedure, and all in the room concurred.  Routine monitors were applied.      The patient was placed in the sitting position on the exam room table. A chlorhexidine prep was completed on area just inferior to insertion of the right and left superior trapezius followed by sterile draping per standard procedure. Using palpation, the area was localized one third the distance between the occipital protuberance and the right mastoid process. After negative aspiration for heme, a 30G 1.5  inch needle was used to inject 1.5 mL of a treatment mixture containing  2 mL of bupivacaine 0.5% in a fan like distribution.  The needle was then removed.  The same procedure was performed on the left side. I then performed lesser occipital nerve block by infiltration of 0.5 ml of above solution in the midway between the greater occipital nerve and the mastoid process. The same procedure was performed on the left side.    Light pressure was held at the puncture site(s) to prevent ecchymosis and oozing.  The patient's skin was cleansed, and hemostasis was confirmed.      Condition:    The patient remained awake and alert throughout the procedure.  The patient tolerated the procedure well and was monitored for approximately 15 minutes afterward in the post procedure area.  There were no immediate post procedure complications noted.  The patient was then discharged to home as per protocol.               Again, thank you for allowing me to participate in the care of your patient.      Sincerely,    Faiza Martinez MD

## 2025-06-25 NOTE — PATIENT INSTRUCTIONS
Procedures:    Call to schedule your procedure: [phone] option #2     Piriformis Injection    Your pre-procedure instructions are below, please call our clinic if you have any questions.      Treatment planning:    Occipital Nerve Blocks completed today-   Call us with any concerns for infection: redness and drainage at the injection site, fever, chills, sweats       Recommended Follow up:      Follow up as needed.       To speak with a nurse, schedule/reschedule/cancel a clinic appointment, or request a medication refill call: [phone]    You can also reach us by [employer]: [email or web address]       Procedure Information:     Please call [phone] option #2 to schedule, reschedule, or cancel your procedure appointment. Leave a voicemail with your name, birth date, and phone number if no one is available to take your call.     The procedure center staff will call or send a MyChart 1-3 days before the procedure to review important information that you will need to know for the day of the procedure, including your arrival time.    Please contact the clinic if you have further questions about this information [phone].         Information related to Scheduling and Pre-Procedure Instructions:  If you must reschedule your procedure more than two times, you must follow up in clinic before rescheduling again.    Preparing for your procedure    CAUTION - FAILURE TO FOLLOW THESE PRE-PROCEDURE INSTRUCTIONS WILL RESULT IN YOUR PROCEDURE BEING RESCHEDULED.    Your Procedure: Piriformis Injection            You must have a  take you home after your procedure. Transportation by taxi or para-transit is okay as long as you have a responsible adult accompany you. You must provide your 's full name and contact number at time of check in.   Fasting Protocol You are allowed to eat and drink as usual prior to the procedure.     Medications If you take any medications, DO NOT STOP.  Take your medications as usual the day of your procedure with a sip of water AT LEAST 2 HOURS PRIOR TO ARRIVAL.    Vaccines You must complete all vaccines more than 2 weeks prior to your scheduled procedure. No vaccines until 2 weeks after your procedure.   Antibiotics If you are currently taking antibiotics, you must complete the entire dose 7 days prior to your scheduled procedure. You must be clear of any signs or symptoms of infection. If you begin antibiotics, please contact our clinic for instructions.   Fever, Chills, Rash, or COVID If you experience a fever of higher than 100 degrees, chills, rash, open wounds, develop COVID symptoms or test positive during the one week before your procedure, please call the clinic to see if you may proceed with your procedure.      How should you care for yourself at home after your injection?  Get plenty of rest and avoid twisting, bending movements, heavy lifting, or strenuous activity for the first 24 hours. If receiving a steroid with your injection, this will help the steroid be more effective.   Resume your pain medications.  Apply ice packs (on for 20 minutes at a time), every 2-3 hours to your injection area for the first 2-3 days to help with pain control.    Avoid heat (pads or water bottles), you can use heat after 48 hours. Especially if receiving a steroid with your injection, heat can cause the veins to open up, making the steroid less effective.     For injections administering a steroid, please be aware that you can expect to feel your normal pain return after the anesthetic wears off in the first 1-2 days, until the steroid becomes effective. Steroid may take up to 3-14 days to be effective.     To speak with a nurse, schedule/reschedule/cancel a clinic appointment, or request a medication refill call: [phone]    You can also reach us by [employer] [email or web address]

## 2025-06-25 NOTE — NURSING NOTE
Patient presents with:  Pain  Pain Management  RECHECK: ONB and follow up      Severe Pain (8)     Pain Medications       Analgesics Other Refills Start End     acetaminophen (TYLENOL) 500 MG tablet --  --    Sig - Route: Take 1,000 mg by mouth every 6 hours as needed for mild pain - Oral    Class: Historical       Opioid Agonists Refills Start End     HYDROmorphone (DILAUDID) 2 MG tablet 0 4/18/2025 --    Sig - Route: Take 1 tablet (2 mg) by mouth daily as needed for severe pain. - Oral    Class: E-Prescribe    Earliest Fill Date: 4/18/2025     traMADol (ULTRAM) 50 MG tablet 0 5/30/2025 --    Sig - Route: Take 1 tablet (50 mg) by mouth every 6 hours as needed for pain. - Oral    Class: E-Prescribe    Notes to Pharmacy: Covering provider    No prior authorization was found for this prescription.    Found prior authorization for another prescription for the same medication: Closed - Other       Opioid Partial Agonists Refills Start End     buprenorphine (BUTRANS) 10 MCG/HR WK patch 1 4/23/2025 --    Sig - Route: Place onto the skin once a week. - Transdermal    Class: E-Prescribe    No prior authorization was found for this prescription.    Found prior authorization for another prescription for the same medication: Approved            What medications are you using for pain? Butrans, tramadol, tizanadine, temp dose of dilaudid    What refills are you needing today? none    Expectations: ONB    Ginny Holland, JACN

## 2025-06-26 ENCOUNTER — TELEPHONE (OUTPATIENT)
Dept: ANESTHESIOLOGY | Facility: CLINIC | Age: 55
End: 2025-06-26
Payer: COMMERCIAL

## 2025-06-26 RX ORDER — SEMAGLUTIDE 0.5 MG/.5ML
INJECTION, SOLUTION SUBCUTANEOUS
Qty: 2 ML | Refills: 0 | Status: SHIPPED | OUTPATIENT
Start: 2025-06-26

## 2025-06-26 NOTE — TELEPHONE ENCOUNTER
Phoned the patient and left VM. Stated to call the pain clinic to schedule injection procedure at [phone].

## 2025-06-27 ENCOUNTER — VIRTUAL VISIT (OUTPATIENT)
Dept: PALLIATIVE MEDICINE | Facility: OTHER | Age: 55
End: 2025-06-27
Payer: COMMERCIAL

## 2025-06-27 DIAGNOSIS — F43.23 ADJUSTMENT DISORDER WITH MIXED ANXIETY AND DEPRESSED MOOD: Primary | ICD-10-CM

## 2025-06-27 DIAGNOSIS — G89.4 CHRONIC PAIN SYNDROME: ICD-10-CM

## 2025-06-27 PROCEDURE — 90834 PSYTX W PT 45 MINUTES: CPT | Mod: 95 | Performed by: SOCIAL WORKER
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
## 2025-06-30 ENCOUNTER — TELEPHONE (OUTPATIENT)
Dept: PALLIATIVE MEDICINE | Facility: OTHER | Age: 55
End: 2025-06-30

## 2025-06-30 DIAGNOSIS — M96.1 POSTLAMINECTOMY SYNDROME: ICD-10-CM

## 2025-06-30 RX ORDER — TRAMADOL HYDROCHLORIDE 50 MG/1
50 TABLET ORAL EVERY 6 HOURS PRN
Qty: 65 TABLET | Refills: 0 | Status: SHIPPED | OUTPATIENT
Start: 2025-06-30 | End: 2025-06-30

## 2025-06-30 NOTE — TELEPHONE ENCOUNTER
Requested Renewals     Name from pharmacy: TRAMADOL HCL 50MG TABS         Will file in chart as: traMADol (ULTRAM) 50 MG tablet    Sig: Take 1 tablet (50 mg) by mouth every 6 hours as needed for pain.    Disp: 65 tablet    Refills: 0    Start: 6/30/2025    Class: E-Prescribe    Non-formulary For: Postlaminectomy syndrome    Last ordered: 1 month ago (5/30/2025) by FIDELINA Rose CNP    Last refill: 5/30/2025    Rx #: [id number]    Rx Protocol Controlled Substance [id number]/30/2025 01:44 PM   Protocol Details Urine drug screeen results on file in past 12 months    Medication not refilled in past 28 days    Auto Fail - Please forward to Provider    SAMANTHA-7 done in past year    Visit with relevant provider in past 3 months or upcoming 3 months (provided they have been seen in the last 6 months)    Medication is active on med list and the sig matches    Controlled Substance Agreement on file in last 12 months    No Benzodiazepines on acive med list    PHQ9 done in past year    Naloxone on active med list    No active pregnancy on record    No pregnancy test in past 12 months or most recent test was negative      To be filled at: [facility], [place] - [address] [place] [address]

## 2025-06-30 NOTE — TELEPHONE ENCOUNTER
Received call from patient requesting refill(s) traMADol (ULTRAM) 50 MG tablet    Last dispensed from pharmacy on 05/30/2025    Patient's last office/virtual visit by prescribing provider on 05/21/2025  Next office/virtual appointment scheduled for 07/30/2025    Last urine drug screen date 08/05/2021  Current opioid agreement on file (completed within the last year) Yes Date of opioid agreement: 08/21/2024    E-prescribe to    [facility] [place] [id number] [place] [address]    Will route to nursing [place] for review and preparation of prescription(s).     Daisy Honeycutt MA  [facility]

## 2025-06-30 NOTE — TELEPHONE ENCOUNTER
I see in Epic it says the receipt for the tramadol was confirmed by us at the [facility], however we don't have it here to process. There is a long-term known error with some refills not linking over to the pharmacy's. Could we please get this resent to us as soon as possible before the holiday weekend?       Thank you so much,   Willem Villarreal CPhT  [facility] Float [facility]

## 2025-07-01 ENCOUNTER — OFFICE VISIT (OUTPATIENT)
Dept: NEUROLOGY | Facility: CLINIC | Age: 55
End: 2025-07-01
Payer: COMMERCIAL

## 2025-07-01 DIAGNOSIS — G43.711 INTRACTABLE CHRONIC MIGRAINE WITHOUT AURA AND WITH STATUS MIGRAINOSUS: Primary | ICD-10-CM

## 2025-07-01 DIAGNOSIS — Q79.60 EHLERS-DANLOS SYNDROME: ICD-10-CM

## 2025-07-01 PROCEDURE — 64615 CHEMODENERV MUSC MIGRAINE: CPT | Performed by: PSYCHIATRY & NEUROLOGY

## 2025-07-01 RX ORDER — TRAMADOL HYDROCHLORIDE 50 MG/1
50 TABLET ORAL EVERY 6 HOURS PRN
Qty: 65 TABLET | Refills: 0 | Status: SHIPPED | OUTPATIENT
Start: 2025-07-01

## 2025-07-01 NOTE — PROGRESS NOTES
Botulinum Toxin Procedure Note     Ada Welch  [id number]     Ordering provider: Noemy Nguyen MD     The procedure was explained to the patient. Benefits of the treatment were discussed including headache and migraine reduction. Risks of the procedure were reviewed including but not limited to pain, bruising, bleeding, infection, weakness of muscles injected or those distal to injection. The patient voiced understanding of the risks and benefits. All questions answered and the patient consented to proceed.      Prior to receiving Botox injections the patient reported the following:  Baseline headache/migraine frequency: more than 15 days a month  Baseline headache/migraine duration: more than 4 hours  Associated migrainous features: wavy vision distortion, nausea, photophobia     Previous treatment trials:  amitriptyline, zonisamide, gabapentin, Aimovig, Emgality, duloxetine, tizanidine, propranolol     Last Botox procedure: 3/25/25  Current migraine frequency:She has been flaring since September. She reports miserable 3 months. Awaiting Vypeti.   Using short acting pain meds such as tramadol and dilaudid and Fioricet from pain clinic.   She has stopped Wegovy still looks very thin    Current migraine duration: constant        Functional improvements since starting botulinum toxin treatments: In addition she notes that she is able to be more functional and interactive with her family.      Last Neurology office visit: 12/31/24 today I reviewed several notes but did not have time to provide a full visit she was booked for a 20 minute procedure.      A 200 unit vial of Botox was diluted with 4ml of 0.9% sodium chloride     Botox lot # and expiration date: See MAR for details  0.9% sodium chloride lot # and expiration date: See MAR for details     The following muscles were injected using a 30 gauge needle:  Procerus 1 site 5 units   2 sites (bilat) 10 units  Frontalis 4 sites (bilat) 20 units ( slight  eyebrow droop on right, Botox shift up and lateral)  Temporalis 8 sites (bilat) 40 units  **Trapezius muscles 4 sites (bilat) 10 units (lateral and reduced)  Occipitalis 6 sites (bilat) 50 units  **Splenius capitus 2 sites (bilat) skipped  Masseter 2 sites (bilat) 20 units     A total of 155 units were injected, 45 units wasted.   The patient tolerated the injections well. I was present for and performed the entire procedure.      Noemy Nguyen MD

## 2025-07-01 NOTE — LETTER
7/1/2025       RE: Ada Welch  [address] [address]     Dear Colleague,    Thank you for referring your patient, Ada Welch, to the [facility]. Please see a copy of my visit note below.    Botulinum Toxin Procedure Note     Ada Welch  [id number]     Ordering provider: Noemy Nguyen MD     The procedure was explained to the patient. Benefits of the treatment were discussed including headache and migraine reduction. Risks of the procedure were reviewed including but not limited to pain, bruising, bleeding, infection, weakness of muscles injected or those distal to injection. The patient voiced understanding of the risks and benefits. All questions answered and the patient consented to proceed.      Prior to receiving Botox injections the patient reported the following:  Baseline headache/migraine frequency: more than 15 days a month  Baseline headache/migraine duration: more than 4 hours  Associated migrainous features: wavy vision distortion, nausea, photophobia     Previous treatment trials:  amitriptyline, zonisamide, gabapentin, Aimovig, Emgality, duloxetine, tizanidine, propranolol     Last Botox procedure: 3/25/25  Current migraine frequency:She has been flaring since September. She reports miserable 3 months. Awaiting Vypeti.   Using short acting pain meds such as tramadol and dilaudid and Fioricet from pain clinic.   She has stopped Wegovy still looks very thin    Current migraine duration: constant        Functional improvements since starting botulinum toxin treatments: In addition she notes that she is able to be more functional and interactive with her family.      Last Neurology office visit: 12/31/24 today I reviewed several notes but did not have time to provide a full visit she was booked for a 20 minute procedure.      A 200 unit vial of Botox was diluted with 4ml of  0.9% sodium chloride     Botox lot # and expiration date: See MAR for details  0.9% sodium chloride lot # and expiration date: See MAR for details     The following muscles were injected using a 30 gauge needle:  Procerus 1 site 5 units   2 sites (bilat) 10 units  Frontalis 4 sites (bilat) 20 units ( slight eyebrow droop on right, Botox shift up and lateral)  Temporalis 8 sites (bilat) 40 units  **Trapezius muscles 4 sites (bilat) 10 units (lateral and reduced)  Occipitalis 6 sites (bilat) 50 units  **Splenius capitus 2 sites (bilat) skipped  Masseter 2 sites (bilat) 20 units     A total of 155 units were injected, 45 units wasted.   The patient tolerated the injections well. I was present for and performed the entire procedure.      Noemy Nguyen MD    Again, thank you for allowing me to participate in the care of your patient.      Sincerely,    Noemy Nguyen MD

## 2025-07-02 DIAGNOSIS — G43.711 INTRACTABLE CHRONIC MIGRAINE WITHOUT AURA AND WITH STATUS MIGRAINOSUS: Primary | ICD-10-CM

## 2025-07-02 RX ORDER — BUPRENORPHINE 10 UG/H
PATCH TRANSDERMAL WEEKLY
Qty: 4 PATCH | Refills: 1 | Status: SHIPPED | OUTPATIENT
Start: 2025-07-02

## 2025-07-02 RX ORDER — ONDANSETRON 4 MG/1
4 TABLET, ORALLY DISINTEGRATING ORAL EVERY 8 HOURS PRN
Qty: 20 TABLET | Refills: 3 | Status: SHIPPED | OUTPATIENT
Start: 2025-07-02

## 2025-07-02 NOTE — TELEPHONE ENCOUNTER
Buprenorphine (Butrans) 10mcg/hr patch  Last filled 6/4 for 28 days  Last visit 5/21  Next visit 7/30    MTM visit scheduled 7/3  UDT/CSA- plan is to complete at next visit    Pending Prescriptions:                       Disp   Refills    buprenorphine (BUTRANS) 10 MCG/HR WK patc*4 patch1            Sig: REMOVE OLD PATCH AND PLACE ONE PATCH ONTO THE           SKIN ONCE EVERY 7 DAYS   [facility]

## 2025-07-07 ENCOUNTER — THERAPY VISIT (OUTPATIENT)
Dept: PHYSICAL THERAPY | Facility: CLINIC | Age: 55
End: 2025-07-07
Attending: ANESTHESIOLOGY
Payer: COMMERCIAL

## 2025-07-07 ENCOUNTER — TELEPHONE (OUTPATIENT)
Dept: ANESTHESIOLOGY | Facility: CLINIC | Age: 55
End: 2025-07-07

## 2025-07-07 DIAGNOSIS — G44.209 TENSION-TYPE HEADACHE, NOT INTRACTABLE, UNSPECIFIED CHRONICITY PATTERN: Primary | ICD-10-CM

## 2025-07-07 DIAGNOSIS — G43.709 CHRONIC MIGRAINE W/O AURA W/O STATUS MIGRAINOSUS, NOT INTRACTABLE: ICD-10-CM

## 2025-07-07 PROBLEM — M79.18 MYOFASCIAL MUSCLE PAIN: Status: ACTIVE | Noted: 2025-06-25

## 2025-07-07 PROCEDURE — 97112 NEUROMUSCULAR REEDUCATION: CPT | Mod: GP | Performed by: PHYSICAL THERAPIST

## 2025-07-07 PROCEDURE — 97161 PT EVAL LOW COMPLEX 20 MIN: CPT | Mod: GP | Performed by: PHYSICAL THERAPIST

## 2025-07-07 PROCEDURE — 97140 MANUAL THERAPY 1/> REGIONS: CPT | Mod: GP | Performed by: PHYSICAL THERAPIST

## 2025-07-07 PROCEDURE — 97110 THERAPEUTIC EXERCISES: CPT | Mod: GP | Performed by: PHYSICAL THERAPIST

## 2025-07-07 NOTE — TELEPHONE ENCOUNTER
Called patient to schedule procedure with Dr. Martinez    Date of Procedure: 7/30/25    Arrival time given: Yes: Arrival Time 1:30am      Procedure Location: [facility]     Verified Location with Patient:  Yes  Address provided to the patient     Pre-op H&P Required:  No: Local anesthesia      Post-Op/Follow Up Appt:  Not Indicated in Request      Informed patient they will need a  to drive them home:  Yes    Patients : Spouse     Patient is aware that pre-op RN from the procedure center will call 2-3 days prior to scheduled procedure to confirm arrival time and review any instructions:  Yes       Additional Comments: N/A        Kerry Meyer MA on 7/7/2025 at 2:00 PM      P: [phone]

## 2025-07-07 NOTE — PROGRESS NOTES
[facility]                                                                                   OUTPATIENT PHYSICAL THERAPY    PLAN OF TREATMENT FOR OUTPATIENT REHABILITATION   Patient's Last Name, First Name, Ada Nash YOB: 1970   Provider's Name   [facility]   Medical Record No.  [id number]     Onset Date: 05/21/25  Start of Care Date: 07/07/25     Medical Diagnosis:  migraines      PT Treatment Diagnosis:  migraines Plan of Treatment  Frequency/Duration: weekly/ 12 weeks initially    Certification date from 07/07/25 to 09/29/25         See note for plan of treatment details and functional goals     Justus Mcmanus, PT                         I CERTIFY THE NEED FOR THESE SERVICES FURNISHED UNDER        THIS PLAN OF TREATMENT AND WHILE UNDER MY CARE     (Physician attestation of this document indicates review and certification of the therapy plan).              Referring Provider:  Gagandeep Coronado    Initial Assessment  See Epic Evaluation- Start of Care Date: 07/07/25        PHYSICAL THERAPY EVALUATION  Type of Visit: Evaluation       Fall Risk Screen:  Have you fallen 2 or more times in the past year?: No  Have you fallen and had an injury in the past year?: No    Subjective   Pt reports she has EDS, has suffered migraines and headaches for many years. The Headaches can be debilitating and severe. She reports ups and downs. A period of COVID laid her low last year for weeks. Recently her headaches were so severe she was throwing up, couldn't leave the house.      Presenting condition or subjective complaint: EDS, mucle and ligament strain, rigid muscles,  Date of onset: 05/21/25    Relevant medical history: Menopause; Migraines or headaches; Numbness or tingling in perianal area; Pain at night or rest; Severe dizziness; Severe headaches; Vision problems   Dates & types of surgery:  80 tonsillectomy,  07 septum,  13  spinal fusion,  14 fusion revision,  19 hiatal hernia    Prior diagnostic imaging/testing results: MRI; CT scan; X-ray; Video fluoroscopic swallow study     Prior therapy history for the same diagnosis, illness or injury: Yes I had craniosacral, cupping, dry needling, grastin, zero gravity treadmill 01/15-05/18    Prior Level of Function  Transfers: Independent  Ambulation: Independent  ADL: Independent  IADL:     Living Environment  Social support: With family members   [facility] Multi-level; Basement   Stairs to enter the home: Yes 4 Is there a railing: Yes     Ramp: No   Stairs inside the home: Yes 28 Is there a railing: Yes     Help at home: Home and Yard maintenance tasks  Equipment owned: Straight Cane; Crutches; Standard wheelchair; Bath bench     Employment: No    Hobbies/Interests: Reading, drawing, walking, swimming, documentaries    Patient goals for therapy: Have more stamina    Pain assessment: Location: headaches/neck /Rating: 10/10      Objective   ADDITIONAL HISTORY:  Pain: Pain Level at Rest: 5/10  Pain Location: occipital, neck, shoulders, arms  Pain Frequency: daily  Pain is Exacerbated By: movement, noise, lifting, certain positions   Pain is Relieved By: prescription meds     Fatigue Level: (scale 0-10)  9/10 average    LEVEL OF FUNCTION AT START OF CARE  Walking tolerance: fair     Sitting tolerance: good   Standing tolerance: fair   Housework tolerance: good   Sleep: POOR     Current Aggravating Activities / Functional Limitations: driving, lifting, activity     Relieving Activities / Self Care: Ice and OTC Medication(s):     Patient's perceived quality of life:  poor    POSTURE: WFL    Observation: POOR Cspine control, AROM and +++++TTP      GAIT:   Weightbearing Status:   Assistive Device(s):   Gait Deviations:     Balance/Proprioception:     RANGE OF MOTION:   Cervical: MJR LOM rotation bilat, Mod LOM ret, ret /ext, flexn  Lumbar: LOM ++++SI dysf bilat   Shoulders:   Hips:  Pelvic asymetry     MUSCLE PERFORMANCE:   Strength: demonstrates core instability; ++      FLEXIBILITY: hyper mobility due to EDS     FUNCTIONAL TESTS:     Pain behaviors:     SPECIAL TEST(S): SI tests, Craniosacral palpations-POOR rhythm, lesions, asymetry, irregularity     SENSATION:       Assessment & Plan   CLINICAL IMPRESSIONS  Medical Diagnosis: migraines    Treatment Diagnosis: migraines   Impression/Assessment: Patient is a 54 year old female with headache, neck  complaints.  The following significant findings have been identified: Pain, Decreased ROM/flexibility, Inflammation, Impaired muscle performance, Decreased activity tolerance, Impaired posture, Instability, and CS findings . These impairments interfere with their ability to perform self care tasks, work tasks, recreational activities, household chores, driving , household mobility, and community mobility as compared to previous level of function.   Pt presents w/ chronic headache patterns, neck dysfunction, SI dysfunction, kinetic imbalance and poor motor control. She will benefit from an extended course of PT including manual therapy, therapeutic exercise and neuro re-ed to improve her functional levels.     Clinical Decision Making (Complexity):  Clinical Presentation: Unstable/Unpredictable   Clinical Presentation Rationale: based on medical and personal factors listed in PT evaluation  Clinical Decision Making (Complexity): High complexity    PLAN OF CARE  Treatment Interventions:  Interventions: Manual Therapy, Neuromuscular Re-education, Therapeutic Exercise, Self-Care/Home Management    Long Term Goals     PT Goal 1  Goal Identifier: headaches  Goal Description: reduce HA frequency/intensity  1-2x per wk 2-3/10  Rationale: to maximize safety and independence with performance of ADLs and functional tasks;to maximize safety and independence within the home;to maximize safety and independence with self cares  Target Date: 09/29/25      Frequency  of Treatment: weekly  Duration of Treatment: 12 weeks initially    Recommended Referrals to Other Professionals: Physical Therapy  Education Assessment:        Risks and benefits of evaluation/treatment have been explained.   Patient/Family/caregiver agrees with Plan of Care.     Evaluation Time:     PT Eval, Low Complexity Minutes (51771): 30       Signing Clinician: JESSICA Zaragoza [facility]                                                                                   OUTPATIENT PHYSICAL THERAPY      PLAN OF TREATMENT FOR OUTPATIENT REHABILITATION   Patient's Last Name, First Name, BRYCELEIGHA WelchAda  L YOB: 1970   Provider's Name   [facility]   Medical Record No.  [id number]     Onset Date: 05/21/25  Start of Care Date: 07/07/25     Medical Diagnosis:  migraines      PT Treatment Diagnosis:  migraines Plan of Treatment  Frequency/Duration: weekly/ 12 weeks initially    Certification date from 07/07/25 to 09/29/25         See note for plan of treatment details and functional goals     Justus Mcmanus, PT                         I CERTIFY THE NEED FOR THESE SERVICES FURNISHED UNDER        THIS PLAN OF TREATMENT AND WHILE UNDER MY CARE     (Physician attestation of this document indicates review and certification of the therapy plan).              Referring Provider:  Gagandeep Coronado    Initial Assessment  See Epic Evaluation- Start of Care Date: 07/07/25

## 2025-07-07 NOTE — TELEPHONE ENCOUNTER
RN reviewed patient chart. Pre procedure instructions were reviewed with the patient at clinic visit.     Daisy Delarosa RNCC

## 2025-07-10 ENCOUNTER — VIRTUAL VISIT (OUTPATIENT)
Dept: CARDIOLOGY | Facility: CLINIC | Age: 55
End: 2025-07-10
Attending: INTERNAL MEDICINE
Payer: COMMERCIAL

## 2025-07-10 VITALS — BODY MASS INDEX: 21.97 KG/M2 | WEIGHT: 140 LBS | HEIGHT: 67 IN

## 2025-07-10 DIAGNOSIS — G90.9 AUTONOMIC DYSFUNCTION: ICD-10-CM

## 2025-07-10 DIAGNOSIS — G90.A POSTURAL ORTHOSTATIC TACHYCARDIA SYNDROME: ICD-10-CM

## 2025-07-10 ASSESSMENT — PAIN SCALES - GENERAL: PAINLEVEL_OUTOF10: SEVERE PAIN (7)

## 2025-07-10 NOTE — PROGRESS NOTES
Virtual Visit Details    Type of service:  Video Visit     HPI:   Ada is a 54-year-old woman who is followed in this clinic for autonomic dysfunction.  Ada is followed by Dr.Mary Rivera    Ada was initially being treated for lightheadedness and rapid heart rates but these seems to have resolved perhaps contributed to by her weight loss treatment with Wegovy and dietary improvement..  She is no longer complaining of regular rapid heartbeat but indicates that it only occurs intermittently.    Overall Ada's main current problem is sleep disorder.      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    Cervicalgia     Chronic sinusitis 00/2007    Diagnosed with chronic pansinusitis 2016    Coagulation disorder     factor 8    CSF leak     Chiari malformation    Depression     Tom-Danlos disease     Eosinophilic esophagitis 08/2018    Gastroesophageal reflux disease 3/1/2017    I have large hiatal hernia    Hiatal hernia 2016    Have adeline hiatel hernia    Hoarseness Unsure    It comes and goes    Hypertension     Hypothyroid     IBS (irritable bowel syndrome) with constipation     improved on Linzess    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    Irregular heart beat     Migraines 1/1/2007    Unsure of exact date    Nasal polyps 2013    Were revoved 03/2017, benign    Orthostatic hypotension     Other nervous system complications 1/2014    Autonomic nervous system disorder, neuralgia, neuropathy    Swelling, mass, or lump in head and neck  08/2016    Lymph node has been growing for last year    Thyroid disease 01/01/2008    Urinary incontinence     Urinary urgency        CURRENT MEDICATIONS:  Current Outpatient Medications   Medication Sig Dispense Refill    estradiol (VIVELLE-DOT) 0.025 MG/24HR bi-weekly patch Place 1 patch over 96 hours onto the skin twice a week. 24 patch 1    guanFACINE (TENEX) 1 MG tablet TAKE ONE TAB BY MOUTH ONCE NIGHTLY AT BEDTIME      levothyroxine (SYNTHROID/LEVOTHROID) 25 MCG tablet Take 1 tablet (25 mcg) by mouth every morning (before breakfast). 90 tablet 1    progesterone (PROMETRIUM) 100 MG capsule Take 1 capsule (100 mg) by mouth daily. 90 capsule 4    propranolol ER (INDERAL LA) 120 MG 24 hr capsule Take 1 capsule (120 mg) by mouth daily. 90 capsule 3    ramelteon (ROZEREM) 8 MG tablet TAKE ONE TABLET BY MOUTH ONCE DAILY AT BEDTIME. 30 tablet 5    topiramate (TOPAMAX) 100 MG tablet Take 1 tablet (100 mg) by mouth daily. 90 tablet 1    acetaminophen (TYLENOL) 500 MG tablet Take 1,000 mg by mouth every 6 hours as needed for mild pain      albuterol (PROAIR HFA/PROVENTIL HFA/VENTOLIN HFA) 108 (90 Base) MCG/ACT inhaler Inhale 2 puffs into the lungs every 6 hours as needed for shortness of breath, wheezing or cough. 18 g 0    atorvastatin (LIPITOR) 20 MG tablet TAKE ONE TABLET BY MOUTH ONCE DAILY 90 tablet 3    budesonide-formoterol (SYMBICORT) 80-4.5 MCG/ACT Inhaler Inhale 2 puffs into the lungs 2 times daily. 10 g 0    buprenorphine (BUTRANS) 10 MCG/HR WK patch Place onto the skin once a week. 4 patch 1    cetirizine (ZYRTEC) 10 MG tablet Take 10 mg by mouth daily as needed       co-enzyme Q-10 100 MG CAPS capsule Take 100 mg by mouth daily      DULoxetine (CYMBALTA) 30 MG capsule Take 1 capsule (30 mg) by mouth at bedtime. TAKE ONE CAPSULE BY MOUTH EVERY NIGHT AT BEDTIME AND TAKE 60 MG CAPSULE IN THE MORNING 90 capsule 1    DULoxetine (CYMBALTA) 60 MG capsule TAKE ONE CAPSULE BY MOUTH EVERY MORNING AND TAKE 30 MG  CAPSULE IN THE EVENING 90 capsule 1    EPINEPHrine (ANY BX GENERIC EQUIV) 0.3 MG/0.3ML injection 2-pack Inject 0.3 mLs (0.3 mg) into the muscle as needed for anaphylaxis May repeat one time in 5-15 minutes if response to initial dose is inadequate. 2 each 1    eptinezumab-jjmr (VYEPTI) 100 MG/ML injection Inject 1 mL (100 mg) into the vein every 3 months.      ferrous fumarate 65 mg, [place]. FE,-Vitamin C 125 mg (VITRON-C)  MG TABS tablet Take 1 tablet by mouth every other day 60 tablet 4    fluticasone (FLONASE) 50 MCG/ACT nasal spray SPRAY 2 SPRAYS INTO BOTH NOSTRILS DAILY 48 g 11    HYDROmorphone (DILAUDID) 2 MG tablet Take 1 tablet (2 mg) by mouth daily as needed for severe pain. 10 tablet 0    hydrOXYzine HCl (ATARAX) 25 MG tablet Take 1 tablet (25 mg) by mouth every 6 hours as needed for anxiety. 180 tablet 1    Lidocaine (LIDOCARE) 4 % Patch Place 1-3 patches onto the skin every 24 hours To prevent lidocaine toxicity, patient should be patch free for 12 hrs daily. 20 patch 0    medical cannabis (Patient's own supply) 1 Dose See Admin Instructions. (The purpose of this order is to document that the patient reports taking medical cannabis.  This is not a prescription, and is not used to certify that the patient has a qualifying medical condition.)      medical cannabis (Patient's own supply.  Not a prescription) Take 1 Dose by mouth See Admin Instructions Capsule formulation - uses as needed      methylPREDNISolone (MEDROL DOSEPAK) 4 MG tablet therapy pack Follow Package Directions 21 tablet 0    Multiple Vitamins-Minerals (MULTIVITAMIN PO) Take 1 tablet by mouth daily       naloxone (NARCAN) 4 MG/0.1ML nasal spray Spray 1 spray (4 mg) into one nostril alternating nostrils once as needed for opioid reversal every 2-3 minutes until assistance arrives 0.2 mL 0    naproxen (NAPROSYN) 250 MG tablet Take 250 mg by mouth 2 times daily (with meals).      ondansetron (ZOFRAN ODT) 4 MG ODT tab Take 1 tablet (4 mg)  by mouth every 8 hours as needed for nausea. 20 tablet 3    Probiotic Product (PROBIOTIC DAILY PO) Take 6 packets by mouth every morning Five-Lac      rizatriptan (MAXALT-MLT) 5 MG ODT TAKE 1-2 TABLETS (5-10 MG) BY MOUTH AT ONSET OF HEADACHE FOR MIGRAINE (MAX 30 MG IN 24 HOURS) 18 tablet 11    SUMAtriptan (IMITREX STATDOSE) 6 MG/0.5ML pen injector kit Inject 0.5 ml (6 mg) subcutaneously at onset of migraine, May repeat in 1 hour , Max 12 mg/24 hrs 2 kit 11    tiZANidine (ZANAFLEX) 4 MG tablet One-half tab bedtime for two nights, one bedtime, may repeat after 4 hours 60 tablet 3    traMADol (ULTRAM) 50 MG tablet Take 1 tablet (50 mg) by mouth every 6 hours as needed for pain. 65 tablet 0    ubrogepant (UBRELVY) 100 MG tablet Take 1 tablet (100 mg) by mouth at onset of headache (headache). 10 tablet 11    vitamin B complex with vitamin C (STRESS TAB) tablet Take 1 tablet by mouth daily      VITAMIN D, CHOLECALCIFEROL, PO Take 2,000 Units by mouth daily         PAST SURGICAL HISTORY:  Past Surgical History:   Procedure Laterality Date    AS REPAIR OF NASAL SEPTUM  2009    repair broke nose    BACK SURGERY  12/09/2013  10/01/2014    Spinal fusion L4-S1, L5 moving 5/8ths in. Remove screws/rods, Dr. Vega    BIOPSY  01/01/2008 & 3/2017    mole biopsy, lymph node biopsy    COLONOSCOPY  03/2017    Colonoscopy and GI    EPIDURAL BLOOD PATCH (PP) N/A 7/2/2024    Procedure: EPIDURAL BLOOD PATCH (PP);  Surgeon: Clark Eaton MD;  Location: [facility] OR    ESOPHAGEAL IMPEDENCE FUNCTION TEST WITH 24 HOUR PH GREATER THAN 1 HOUR N/A 09/17/2019    Procedure: IMPEDANCE PH STUDY, ESOPHAGUS, 24 HOUR;  Surgeon: Raghavendra Grande MD;  Location:  GI    ESOPHAGOSCOPY, GASTROSCOPY, DUODENOSCOPY (EGD), COMBINED N/A 08/03/2018    Procedure: COMBINED ESOPHAGOSCOPY, GASTROSCOPY, DUODENOSCOPY (EGD), BIOPSY SINGLE OR MULTIPLE;;  Surgeon: Juan Woodson MD;  [facility]    ESOPHAGOSCOPY, GASTROSCOPY, DUODENOSCOPY (EGD), COMBINED N/A  10/22/2018    Procedure: COMBINED ESOPHAGOSCOPY, GASTROSCOPY, DUODENOSCOPY (EGD), BIOPSY SINGLE OR MULTIPLE;  Surgeon: Sadia Carolina MD;  [facility]: [facility]    ESOPHAGOSCOPY, GASTROSCOPY, DUODENOSCOPY (EGD), COMBINED N/A 12/17/2018    Procedure: COMBINED ESOPHAGOSCOPY, GASTROSCOPY, DUODENOSCOPY (EGD);  Surgeon: Juan Woodson MD;  [facility]: [facility]    ESOPHAGOSCOPY, GASTROSCOPY, DUODENOSCOPY (EGD), COMBINED N/A 10/14/2022    Procedure: ESOPHAGOGASTRODUODENOSCOPY, WITH BIOPSY;  Surgeon: Jay Shaffer MD;  Location: [facility] OR    INJECT BLOCK MEDIAL BRANCH CERVICAL/THORACIC/LUMBAR Left 09/24/2020    Procedure: BLOCK, NERVE, FACET JOINT, MEDIAL BRANCH, DIAGNOSTIC;  Surgeon: Faiza Martinez MD;  [facility]    INJECT BLOCK MEDIAL BRANCH CERVICAL/THORACIC/LUMBAR Bilateral 10/08/2020    Procedure: Bilateral cervical 3, cervical 4, and cervical 5 medial branch nerve block;  Surgeon: Faiza Martinez MD;  Location: [facility] OR    INJECT BLOCK MEDIAL BRANCH CERVICAL/THORACIC/LUMBAR Right 03/04/2021    Procedure: Cervical 3, cervical 4, cervical 5 medial branch nerve blocks to target C3-C4 and C4-C5 facet joint;  Surgeon: Faiza Martinez MD;  Location: [facility] OR    INJECT EPIDURAL CERVICAL N/A 10/27/2022    Procedure: INJECTION, SPINE, CERVICAL, EPIDURAL, C7-T1;  Surgeon: Faiza Martinez MD;  Location: [facility] OR    INJECT EPIDURAL CERVICAL Bilateral 03/30/2023    Procedure: INJECTION, SPINE, CERVICAL C7-T1, EPIDURAL;  Surgeon: Faiza Martinez MD;  Location: [facility] OR    INJECT EPIDURAL LUMBAR Left 06/08/2023    Procedure: INJECTION, SPINE, LUMBAR, EPIDURAL;  Surgeon: Faiza Martinez MD;  Location: [facility] OR    INJECT EPIDURAL LUMBAR Bilateral 11/30/2023    Procedure: Interlaminar lumbar epidural steroid injection (L3-L4);  Surgeon: Faiza Martinez MD;  Location: [facility] OR    INJECT JOINT SACROILIAC Bilateral 06/08/2023    Procedure: INJECT JOINT SACROILIAC (bilateral);  Surgeon: Faiza Martinez MD;  Location: [facility] OR    INJECT TRIGGER  POINT SINGLE / MULTIPLE 1 OR 2 MUSCLES Bilateral 2/8/2024    Procedure: Bilateral piriformis muscle injection under x-ray guidance;  Surgeon: Faiza Martinez MD;  Location: [facility] OR    LAPAROSCOPIC HERNIORRHAPHY HIATAL N/A 02/10/2020    Procedure: LAPAROSCOPIC HIATAL HERNIA REPAIR, NISSEN FUNDOPLICATION, ESOPHOGASTRODUODENOSCOPY, PEG TUBE PLACEMENT.;  Surgeon: Alana Mack MD;  [facility]    NASAL/SINUS POLYPECTOMY  2017    Polyps were benign    NOSE SURGERY  2007    deviated septum    TONSILLECTOMY & ADENOIDECTOMY  1981    TUBAL LIGATION  07/01/2011       ALLERGIES:     Allergies   Allergen Reactions    Baclofen Other (See Comments)     Loss of muscle tone. Muscle weakness.    Bee Venom Shortness Of Breath, Palpitations, Anaphylaxis and Difficulty breathing     Patient does carry Epi-Pen    Chicken-Derived Products (Egg) Nausea and Diarrhea    Compazine [Prochlorperazine]      Extreme jittery    Food      Milk    Gabapentin      Dizzy    Gluten Meal GI Disturbance     Wheat bran and wheat    Pregabalin     Seasonal Allergies      Dust, mold       FAMILY HISTORY:  Family History   Problem Relation Age of Onset    Connective Tissue Disorder Mother         eds    Thyroid Disease Mother     Cancer Father         Malignant tumor on base of spine that grew into spinal column & brain    Migraines Father     Diabetes Maternal Grandmother         Diagnosed later in life    Heart Disease Maternal Grandmother     Hypertension Maternal Grandmother     Diabetes Paternal Grandmother         Elderly diabetes    Cancer Paternal Grandmother         Uterine & Ovarian cancer    Diabetes Paternal Grandfather         Diagnosed later in life    Cancer Paternal Grandfather         Prostate cancer that spread to colon and lungs    Connective Tissue Disorder Sister         eds    Asthma Sister         Pediatric Asthma    Migraines Sister     Thyroid Disease Sister     Thyroid Disease Sister         ,    Migraines Sister      "Migraines Sister     Thyroid Disease Sister     Asthma Son         Pediatric Asthma    Breast Cancer No family hx of     Glaucoma No family hx of     Macular Degeneration No family hx of         SOCIAL HISTORY:  Social History     Tobacco Use    Smoking status: Former     Current packs/day: 0.00     Average packs/day: 0.2 packs/day for 22.9 years (4.6 ttl pk-yrs)     Types: Cigarettes     Start date: 1991     Quit date: 2013     Years since quittin.6     Passive exposure: Past    Smokeless tobacco: Never   Vaping Use    Vaping status: Some Days    Substances: THC   Substance Use Topics    Alcohol use: Yes     Comment: Occasionally    Drug use: Yes     Types: Marijuana     Comment: medical marijuana       ROS:   Constitutional: No fever, chills, or sweats. Weight stable.   ENT: No visual disturbance,Cardiovascular: As per HPI.   Respiratory: No cough, hemoptysis.    GI: No nausea, vomiting, : No hematuria.   Integument: Negative.   Psychiatric: Negative.   Hematologic:  Easy bruising, no easy bleeding.  Neuro: Negative.   Endocrinology: No significant heat or cold intolerance   Musculoskeletal: No myalgia.    Exam:  Ht 1.702 m (5' 7\")   Wt 63.5 kg (140 lb)   LMP  (LMP Unknown)   BMI 21.93 kg/m    GENERAL APPEARANCE: Marked weight loss noted otherwise healthy, alert and no distress  HEENT: no icterus, , no central cyanosis  NECK:, JVP not elevated  RESPIRATORY: no rales, rhonchi or wheezes, no use of accessory muscles, no retractions, respirations are unlabored, normal respiratory rate  NEURO: alert and oriented to person/place/time, normal speech,  and affect  SKIN: no ecchymoses, no rashes    Labs:  CBC RESULTS:   Lab Results   Component Value Date    WBC 4.7 2025    WBC 6.2 2020    RBC 4.17 2025    RBC 4.42 2020    HGB 12.9 2025    HGB 12.1 2020    HCT 38.9 2025    HCT 38.3 2020    MCV 93 2025    MCV 87 2020    MCH 30.9 2025    MCH " 27.4 11/13/2020    MCHC 33.2 04/14/2025    MCHC 31.6 11/13/2020    RDW 12.9 04/14/2025    RDW 14.6 11/13/2020     04/14/2025     11/13/2020       BMP RESULTS:  Lab Results   Component Value Date     04/14/2025     11/13/2020    POTASSIUM 4.7 04/14/2025    POTASSIUM 4.5 11/13/2020    CHLORIDE 105 04/14/2025    CHLORIDE 102 11/13/2020    CO2 24 04/14/2025    CO2 29 11/13/2020    ANIONGAP 10 04/14/2025    ANIONGAP 4 11/13/2020    GLC 90 04/14/2025    GLC 96 11/13/2020    BUN 11.2 04/14/2025    BUN 10 11/13/2020    CR 0.98 (H) 04/14/2025    CR 0.83 11/13/2020    GFRESTIMATED 69 04/14/2025    GFRESTIMATED >60 09/15/2023    GFRESTIMATED 83 11/13/2020    GFRESTBLACK >90 11/13/2020    JUAN 9.5 04/14/2025    JUAN 8.6 11/13/2020        INR RESULTS:  Lab Results   Component Value Date    INR 0.93 01/30/2020    INR 0.97 02/28/2019    INR 0.90 12/17/2018    INR 0.96 07/06/2018       Procedures:  PULMONARY FUNCTION TESTS:        No data to display                  ECHOCARDIOGRAM:   No results found for this or any previous visit (from the past 8760 hours).      Assessment and Plan:   1.  Autonomic dysfunction under adequate control with current medical regimen  2.  Marked weight loss associated with use of Wegovy and adjustment of diet  3.  No new cardiovascular complaints    Plan  1.  No treatment change with respect to cardiovascular issues  2.  Arrange a video follow-up 10 to 12 months    Total elapsed time today with chart review, clinic visit and documentation 30 minutes    I very much appreciated the opportunity to see and assess Ada KINA Welch in the clinic today. Please do not hesitate to contact my office if you have any questions or concerns.      Kirk Russell MD  Cardiac Arrhythmia Service  [facility]  [phone]       CC  LOGEAIS, HEATHER REILLY

## 2025-07-10 NOTE — NURSING NOTE
Current patient location: [address] [place] [id number]    Is the patient currently in the state of [place]? YES    Visit mode: VIDEO    If the visit is dropped, the patient can be reconnected by:VIDEO VISIT: Text to cell phone:   Telephone Information:   Mobile [phone]       Will anyone else be joining the visit? NO  (If patient encounters technical issues they should call [phone]    Are changes needed to the allergy or medication list? No    Are refills needed on medications prescribed by this physician? Discuss with provider    Rooming Documentation:  Questionnaire(s) completed    Reason for visit: RECHECK    Jackie Saez VVF  No vitals

## 2025-07-10 NOTE — PATIENT INSTRUCTIONS
Plan:   1.  No treatment change with respect to cardiovascular issues  2.  Arrange a video follow-up 10 to 12 months      Your Care Team:  [facility] Cardiology   Telephone Number     Peter Montanez RN [phone]    After business hours: [phone], ask for cardiologist on-call   Non-procedure scheduling:    Angela JOSE   [phone]   Procedure scheduling:    Arlyn Amezcua   [phone]   Device Clinic (Pacemakers, ICDs, Loop Recorders)    During business hours: [phone]  After business hours:   [phone]- select option 4 and ask for job code [id number].       Cardiovascular Clinic:   [address] [place] [address]. [place] [address]      As always, thank you for trusting us with your health care needs!    If you have further questions, please utilize eigital to contact us.

## 2025-07-10 NOTE — LETTER
7/10/2025      RE: Ada Welch  [address] [facility] [address]       Dear Colleague,    Thank you for the opportunity to participate in the care of your patient, Ada Welch, at the [facility]. Please see a copy of my visit note below.    Virtual Visit Details    Type of service:  Video Visit     HPI:   Ada is a 54-year-old woman who is followed in this clinic for autonomic dysfunction.  Ada is followed by Dr.Mary Rivera    Ada was initially being treated for lightheadedness and rapid heart rates but these seems to have resolved perhaps contributed to by her weight loss treatment with Wegovy and dietary improvement..  She is no longer complaining of regular rapid heartbeat but indicates that it only occurs intermittently.    Overall Ada's main current problem is sleep disorder.      There are no new cardiovascular symptoms or autonomic dysfunction seems to be stable and no medication changes are recommended at this time    PAST MEDICAL HISTORY:  Past Medical History:   Diagnosis Date     Allergic rhinitis 09/2007     Anxiety      Arthritis 11/01/2013    Found during search for cause of back pain     Autoimmune disease 2016    Immunosuppresive     Autonomic dysfunction      Bleeding disorder 2016    Bruise easily     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     Cervicalgia      Chronic sinusitis 00/2007    Diagnosed with chronic pansinusitis 2016     Coagulation disorder     factor 8     CSF leak     Chiari malformation     Depression      Tom-Danlos disease      Eosinophilic esophagitis 08/2018     Gastroesophageal reflux disease 3/1/2017    I have large hiatal hernia     Hiatal hernia 2016    Have adeline hiatel hernia     Hoarseness Unsure    It comes and goes     Hypertension      Hypothyroid      IBS (irritable bowel syndrome) with constipation     improved on Linzess      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     Irregular heart beat      Migraines 1/1/2007    Unsure of exact date     Nasal polyps 2013    Were revoved 03/2017, benign     Orthostatic hypotension      Other nervous system complications 1/2014    Autonomic nervous system disorder, neuralgia, neuropathy     Swelling, mass, or lump in head and neck 08/2016    Lymph node has been growing for last year     Thyroid disease 01/01/2008     Urinary incontinence      Urinary urgency        CURRENT MEDICATIONS:  Current Outpatient Medications   Medication Sig Dispense Refill     estradiol (VIVELLE-DOT) 0.025 MG/24HR bi-weekly patch Place 1 patch over 96 hours onto the skin twice a week. 24 patch 1     guanFACINE (TENEX) 1 MG tablet TAKE ONE TAB BY MOUTH ONCE NIGHTLY AT BEDTIME       levothyroxine (SYNTHROID/LEVOTHROID) 25 MCG tablet Take 1 tablet (25 mcg) by mouth every morning (before breakfast). 90 tablet 1     progesterone (PROMETRIUM) 100 MG capsule Take 1 capsule (100 mg) by mouth daily. 90 capsule 4     propranolol ER (INDERAL LA) 120 MG 24 hr capsule Take 1 capsule (120 mg) by mouth daily. 90 capsule 3     ramelteon (ROZEREM) 8 MG tablet TAKE ONE TABLET BY MOUTH ONCE DAILY AT BEDTIME. 30 tablet 5     topiramate (TOPAMAX) 100 MG tablet Take 1 tablet (100 mg) by mouth daily. 90 tablet 1     acetaminophen (TYLENOL) 500 MG tablet Take 1,000 mg by mouth every 6 hours as needed for mild pain       albuterol (PROAIR HFA/PROVENTIL HFA/VENTOLIN HFA) 108 (90 Base) MCG/ACT inhaler Inhale 2 puffs into the lungs every 6 hours as needed for shortness of breath, wheezing or cough. 18 g 0     atorvastatin (LIPITOR) 20 MG tablet TAKE ONE TABLET BY MOUTH ONCE DAILY 90 tablet 3     budesonide-formoterol (SYMBICORT) 80-4.5 MCG/ACT Inhaler Inhale 2 puffs into the lungs 2 times daily. 10 g 0     buprenorphine (BUTRANS) 10 MCG/HR WK patch Place onto the skin once a week. 4 patch 1     cetirizine (ZYRTEC) 10 MG tablet  Take 10 mg by mouth daily as needed        co-enzyme Q-10 100 MG CAPS capsule Take 100 mg by mouth daily       DULoxetine (CYMBALTA) 30 MG capsule Take 1 capsule (30 mg) by mouth at bedtime. TAKE ONE CAPSULE BY MOUTH EVERY NIGHT AT BEDTIME AND TAKE 60 MG CAPSULE IN THE MORNING 90 capsule 1     DULoxetine (CYMBALTA) 60 MG capsule TAKE ONE CAPSULE BY MOUTH EVERY MORNING AND TAKE 30 MG CAPSULE IN THE EVENING 90 capsule 1     EPINEPHrine (ANY BX GENERIC EQUIV) 0.3 MG/0.3ML injection 2-pack Inject 0.3 mLs (0.3 mg) into the muscle as needed for anaphylaxis May repeat one time in 5-15 minutes if response to initial dose is inadequate. 2 each 1     eptinezumab-jjmr (VYEPTI) 100 MG/ML injection Inject 1 mL (100 mg) into the vein every 3 months.       ferrous fumarate 65 mg, Tribe. FE,-Vitamin C 125 mg (VITRON-C)  MG TABS tablet Take 1 tablet by mouth every other day 60 tablet 4     fluticasone (FLONASE) 50 MCG/ACT nasal spray SPRAY 2 SPRAYS INTO BOTH NOSTRILS DAILY 48 g 11     HYDROmorphone (DILAUDID) 2 MG tablet Take 1 tablet (2 mg) by mouth daily as needed for severe pain. 10 tablet 0     hydrOXYzine HCl (ATARAX) 25 MG tablet Take 1 tablet (25 mg) by mouth every 6 hours as needed for anxiety. 180 tablet 1     Lidocaine (LIDOCARE) 4 % Patch Place 1-3 patches onto the skin every 24 hours To prevent lidocaine toxicity, patient should be patch free for 12 hrs daily. 20 patch 0     medical cannabis (Patient's own supply) 1 Dose See Admin Instructions. (The purpose of this order is to document that the patient reports taking medical cannabis.  This is not a prescription, and is not used to certify that the patient has a qualifying medical condition.)       medical cannabis (Patient's own supply.  Not a prescription) Take 1 Dose by mouth See Admin Instructions Capsule formulation - uses as needed       methylPREDNISolone (MEDROL DOSEPAK) 4 MG tablet therapy pack Follow Package Directions 21 tablet 0     Multiple  Vitamins-Minerals (MULTIVITAMIN PO) Take 1 tablet by mouth daily        naloxone (NARCAN) 4 MG/0.1ML nasal spray Spray 1 spray (4 mg) into one nostril alternating nostrils once as needed for opioid reversal every 2-3 minutes until assistance arrives 0.2 mL 0     naproxen (NAPROSYN) 250 MG tablet Take 250 mg by mouth 2 times daily (with meals).       ondansetron (ZOFRAN ODT) 4 MG ODT tab Take 1 tablet (4 mg) by mouth every 8 hours as needed for nausea. 20 tablet 3     Probiotic Product (PROBIOTIC DAILY PO) Take 6 packets by mouth every morning Five-Lac       rizatriptan (MAXALT-MLT) 5 MG ODT TAKE 1-2 TABLETS (5-10 MG) BY MOUTH AT ONSET OF HEADACHE FOR MIGRAINE (MAX 30 MG IN 24 HOURS) 18 tablet 11     SUMAtriptan (IMITREX STATDOSE) 6 MG/0.5ML pen injector kit Inject 0.5 ml (6 mg) subcutaneously at onset of migraine, May repeat in 1 hour , Max 12 mg/24 hrs 2 kit 11     tiZANidine (ZANAFLEX) 4 MG tablet One-half tab bedtime for two nights, one bedtime, may repeat after 4 hours 60 tablet 3     traMADol (ULTRAM) 50 MG tablet Take 1 tablet (50 mg) by mouth every 6 hours as needed for pain. 65 tablet 0     ubrogepant (UBRELVY) 100 MG tablet Take 1 tablet (100 mg) by mouth at onset of headache (headache). 10 tablet 11     vitamin B complex with vitamin C (STRESS TAB) tablet Take 1 tablet by mouth daily       VITAMIN D, CHOLECALCIFEROL, PO Take 2,000 Units by mouth daily         PAST SURGICAL HISTORY:  Past Surgical History:   Procedure Laterality Date     AS REPAIR OF NASAL SEPTUM  2009    repair broke nose     BACK SURGERY  12/09/2013  10/01/2014    Spinal fusion L4-S1, L5 moving 5/8ths in. Remove screws/rods, Dr. Vega     BIOPSY  01/01/2008 & 3/2017    mole biopsy, lymph node biopsy     COLONOSCOPY  03/2017    Colonoscopy and GI     EPIDURAL BLOOD PATCH (PP) N/A 7/2/2024    Procedure: EPIDURAL BLOOD PATCH (PP);  Surgeon: Clark Eaton MD;  Location: [facility] OR     ESOPHAGEAL IMPEDENCE FUNCTION TEST WITH 24 HOUR PH  GREATER THAN 1 HOUR N/A 09/17/2019    Procedure: IMPEDANCE PH STUDY, ESOPHAGUS, 24 HOUR;  Surgeon: Raghavendra Grande MD;  Location: [facility]     ESOPHAGOSCOPY, GASTROSCOPY, DUODENOSCOPY (EGD), COMBINED N/A 08/03/2018    Procedure: COMBINED ESOPHAGOSCOPY, GASTROSCOPY, DUODENOSCOPY (EGD), BIOPSY SINGLE OR MULTIPLE;;  Surgeon: Juan Woodson MD;  Location: [facility]     ESOPHAGOSCOPY, GASTROSCOPY, DUODENOSCOPY (EGD), COMBINED N/A 10/22/2018    Procedure: COMBINED ESOPHAGOSCOPY, GASTROSCOPY, DUODENOSCOPY (EGD), BIOPSY SINGLE OR MULTIPLE;  Surgeon: Sadia Carolina MD;  Location: [facility]     ESOPHAGOSCOPY, GASTROSCOPY, DUODENOSCOPY (EGD), COMBINED N/A 12/17/2018    Procedure: COMBINED ESOPHAGOSCOPY, GASTROSCOPY, DUODENOSCOPY (EGD);  Surgeon: Juan Woodson MD;  Location: [facility]     ESOPHAGOSCOPY, GASTROSCOPY, DUODENOSCOPY (EGD), COMBINED N/A 10/14/2022    Procedure: ESOPHAGOGASTRODUODENOSCOPY, WITH BIOPSY;  Surgeon: Jay Shaffer MD;  Location: [facility] OR     INJECT BLOCK MEDIAL BRANCH CERVICAL/THORACIC/LUMBAR Left 09/24/2020    Procedure: BLOCK, NERVE, FACET JOINT, MEDIAL BRANCH, DIAGNOSTIC;  Surgeon: Faiza Martinez MD;  [facility]     INJECT BLOCK MEDIAL BRANCH CERVICAL/THORACIC/LUMBAR Bilateral 10/08/2020    Procedure: Bilateral cervical 3, cervical 4, and cervical 5 medial branch nerve block;  Surgeon: Faiza Martinez MD;  Location: [facility] OR     INJECT BLOCK MEDIAL BRANCH CERVICAL/THORACIC/LUMBAR Right 03/04/2021    Procedure: Cervical 3, cervical 4, cervical 5 medial branch nerve blocks to target C3-C4 and C4-C5 facet joint;  Surgeon: Faiza Martinez MD;  Location: [facility] OR     INJECT EPIDURAL CERVICAL N/A 10/27/2022    Procedure: INJECTION, SPINE, CERVICAL, EPIDURAL, C7-T1;  Surgeon: Faiza Martinez MD;  Location: [facility] OR     INJECT EPIDURAL CERVICAL Bilateral 03/30/2023    Procedure: INJECTION, SPINE, CERVICAL C7-T1, EPIDURAL;  Surgeon: Faiza Martinez MD;  Location: [facility] OR     INJECT EPIDURAL LUMBAR Left  06/08/2023    Procedure: INJECTION, SPINE, LUMBAR, EPIDURAL;  Surgeon: Faiza Martinez MD;  Location: [facility] OR     INJECT EPIDURAL LUMBAR Bilateral 11/30/2023    Procedure: Interlaminar lumbar epidural steroid injection (L3-L4);  Surgeon: Faiza Martinez MD;  Location: [facility] OR     INJECT JOINT SACROILIAC Bilateral 06/08/2023    Procedure: INJECT JOINT SACROILIAC (bilateral);  Surgeon: Faiza Martinez MD;  Location: [facility] OR     INJECT TRIGGER POINT SINGLE / MULTIPLE 1 OR 2 MUSCLES Bilateral 2/8/2024    Procedure: Bilateral piriformis muscle injection under x-ray guidance;  Surgeon: Faiza Martinez MD;  Location: [facility] OR     LAPAROSCOPIC HERNIORRHAPHY HIATAL N/A 02/10/2020    Procedure: LAPAROSCOPIC HIATAL HERNIA REPAIR, NISSEN FUNDOPLICATION, ESOPHOGASTRODUODENOSCOPY, PEG TUBE PLACEMENT.;  Surgeon: Alana Mack MD;  [facility]     NASAL/SINUS POLYPECTOMY  2017    Polyps were benign     NOSE SURGERY  2007    deviated septum     TONSILLECTOMY & ADENOIDECTOMY  1981     TUBAL LIGATION  07/01/2011       ALLERGIES:     Allergies   Allergen Reactions     Baclofen Other (See Comments)     Loss of muscle tone. Muscle weakness.     Bee Venom Shortness Of Breath, Palpitations, Anaphylaxis and Difficulty breathing     Patient does carry Epi-Pen     Chicken-Derived Products (Egg) Nausea and Diarrhea     Compazine [Prochlorperazine]      Extreme jittery     Food      Milk     Gabapentin      Dizzy     Gluten Meal GI Disturbance     Wheat bran and wheat     Pregabalin      Seasonal Allergies      Dust, mold       FAMILY HISTORY:  Family History   Problem Relation Age of Onset     Connective Tissue Disorder Mother         eds     Thyroid Disease Mother      Cancer Father         Malignant tumor on base of spine that grew into spinal column & brain     Migraines Father      Diabetes Maternal Grandmother         Diagnosed later in life     Heart Disease Maternal Grandmother      Hypertension Maternal Grandmother      Diabetes  "Paternal Grandmother         Elderly diabetes     Cancer Paternal Grandmother         Uterine & Ovarian cancer     Diabetes Paternal Grandfather         Diagnosed later in life     Cancer Paternal Grandfather         Prostate cancer that spread to colon and lungs     Connective Tissue Disorder Sister         eds     Asthma Sister         Pediatric Asthma     Migraines Sister      Thyroid Disease Sister      Thyroid Disease Sister         ,     Migraines Sister      Migraines Sister      Thyroid Disease Sister      Asthma Son         Pediatric Asthma     Breast Cancer No family hx of      Glaucoma No family hx of      Macular Degeneration No family hx of         SOCIAL HISTORY:  Social History     Tobacco Use     Smoking status: Former     Current packs/day: 0.00     Average packs/day: 0.2 packs/day for 22.9 years (4.6 ttl pk-yrs)     Types: Cigarettes     Start date: 1991     Quit date: 2013     Years since quittin.6     Passive exposure: Past     Smokeless tobacco: Never   Vaping Use     Vaping status: Some Days     Substances: THC   Substance Use Topics     Alcohol use: Yes     Comment: Occasionally     Drug use: Yes     Types: Marijuana     Comment: medical marijuana       ROS:   Constitutional: No fever, chills, or sweats. Weight stable.   ENT: No visual disturbance,Cardiovascular: As per HPI.   Respiratory: No cough, hemoptysis.    GI: No nausea, vomiting, : No hematuria.   Integument: Negative.   Psychiatric: Negative.   Hematologic:  Easy bruising, no easy bleeding.  Neuro: Negative.   Endocrinology: No significant heat or cold intolerance   Musculoskeletal: No myalgia.    Exam:  Ht 1.702 m (5' 7\")   Wt 63.5 kg (140 lb)   LMP  (LMP Unknown)   BMI 21.93 kg/m    GENERAL APPEARANCE: Marked weight loss noted otherwise healthy, alert and no distress  HEENT: no icterus, , no central cyanosis  NECK:, JVP not elevated  RESPIRATORY: no rales, rhonchi or wheezes, no use of accessory muscles, no " retractions, respirations are unlabored, normal respiratory rate  NEURO: alert and oriented to person/place/time, normal speech,  and affect  SKIN: no ecchymoses, no rashes    Labs:  CBC RESULTS:   Lab Results   Component Value Date    WBC 4.7 04/14/2025    WBC 6.2 11/13/2020    RBC 4.17 04/14/2025    RBC 4.42 11/13/2020    HGB 12.9 04/14/2025    HGB 12.1 11/13/2020    HCT 38.9 04/14/2025    HCT 38.3 11/13/2020    MCV 93 04/14/2025    MCV 87 11/13/2020    MCH 30.9 04/14/2025    MCH 27.4 11/13/2020    MCHC 33.2 04/14/2025    MCHC 31.6 11/13/2020    RDW 12.9 04/14/2025    RDW 14.6 11/13/2020     04/14/2025     11/13/2020       BMP RESULTS:  Lab Results   Component Value Date     04/14/2025     11/13/2020    POTASSIUM 4.7 04/14/2025    POTASSIUM 4.5 11/13/2020    CHLORIDE 105 04/14/2025    CHLORIDE 102 11/13/2020    CO2 24 04/14/2025    CO2 29 11/13/2020    ANIONGAP 10 04/14/2025    ANIONGAP 4 11/13/2020    GLC 90 04/14/2025    GLC 96 11/13/2020    BUN 11.2 04/14/2025    BUN 10 11/13/2020    CR 0.98 (H) 04/14/2025    CR 0.83 11/13/2020    GFRESTIMATED 69 04/14/2025    GFRESTIMATED >60 09/15/2023    GFRESTIMATED 83 11/13/2020    GFRESTBLACK >90 11/13/2020    JUAN 9.5 04/14/2025    JUAN 8.6 11/13/2020        INR RESULTS:  Lab Results   Component Value Date    INR 0.93 01/30/2020    INR 0.97 02/28/2019    INR 0.90 12/17/2018    INR 0.96 07/06/2018       Procedures:  PULMONARY FUNCTION TESTS:        No data to display                  ECHOCARDIOGRAM:   No results found for this or any previous visit (from the past 8760 hours).      Assessment and Plan:   1.  Autonomic dysfunction under adequate control with current medical regimen  2.  Marked weight loss associated with use of Wegovy and adjustment of diet  3.  No new cardiovascular complaints    Plan  1.  No treatment change with respect to cardiovascular issues  2.  Arrange a video follow-up 10 to 12 months    Total elapsed time today with chart  review, clinic visit and documentation 30 minutes    I very much appreciated the opportunity to see and assess Ada Welch in the clinic today. Please do not hesitate to contact my office if you have any questions or concerns.      Kirk Russell MD  Cardiac Arrhythmia Service  [facility]  [phone]       CC  LOGEAIS, HEATHER REILLY      Please do not hesitate to contact me if you have any questions/concerns.     Sincerely,     Kirk Russell MD

## 2025-07-15 ENCOUNTER — THERAPY VISIT (OUTPATIENT)
Dept: PHYSICAL THERAPY | Facility: CLINIC | Age: 55
End: 2025-07-15
Payer: COMMERCIAL

## 2025-07-15 ENCOUNTER — VIRTUAL VISIT (OUTPATIENT)
Dept: INTERNAL MEDICINE | Facility: CLINIC | Age: 55
End: 2025-07-15
Payer: COMMERCIAL

## 2025-07-15 ENCOUNTER — VIRTUAL VISIT (OUTPATIENT)
Dept: PALLIATIVE MEDICINE | Facility: OTHER | Age: 55
End: 2025-07-15
Attending: ANESTHESIOLOGY
Payer: COMMERCIAL

## 2025-07-15 DIAGNOSIS — R63.5 WEIGHT GAIN: ICD-10-CM

## 2025-07-15 DIAGNOSIS — G47.00 INSOMNIA, UNSPECIFIED TYPE: ICD-10-CM

## 2025-07-15 DIAGNOSIS — G89.29 OTHER CHRONIC PAIN: ICD-10-CM

## 2025-07-15 DIAGNOSIS — J01.01 ACUTE RECURRENT MAXILLARY SINUSITIS: Primary | ICD-10-CM

## 2025-07-15 DIAGNOSIS — G89.4 CHRONIC PAIN SYNDROME: ICD-10-CM

## 2025-07-15 DIAGNOSIS — G43.711 INTRACTABLE CHRONIC MIGRAINE WITHOUT AURA AND WITH STATUS MIGRAINOSUS: ICD-10-CM

## 2025-07-15 DIAGNOSIS — F43.23 ADJUSTMENT DISORDER WITH MIXED ANXIETY AND DEPRESSED MOOD: Primary | ICD-10-CM

## 2025-07-15 DIAGNOSIS — G44.209 TENSION-TYPE HEADACHE, NOT INTRACTABLE, UNSPECIFIED CHRONICITY PATTERN: Primary | ICD-10-CM

## 2025-07-15 DIAGNOSIS — R41.840 INATTENTION: ICD-10-CM

## 2025-07-15 DIAGNOSIS — G43.709 CHRONIC MIGRAINE W/O AURA W/O STATUS MIGRAINOSUS, NOT INTRACTABLE: ICD-10-CM

## 2025-07-15 PROCEDURE — 1125F AMNT PAIN NOTED PAIN PRSNT: CPT | Performed by: INTERNAL MEDICINE

## 2025-07-15 PROCEDURE — 97110 THERAPEUTIC EXERCISES: CPT | Mod: GP | Performed by: PHYSICAL THERAPIST

## 2025-07-15 PROCEDURE — 90837 PSYTX W PT 60 MINUTES: CPT | Mod: 95 | Performed by: SOCIAL WORKER

## 2025-07-15 PROCEDURE — 97140 MANUAL THERAPY 1/> REGIONS: CPT | Mod: GP | Performed by: PHYSICAL THERAPIST

## 2025-07-15 PROCEDURE — 97112 NEUROMUSCULAR REEDUCATION: CPT | Mod: GP | Performed by: PHYSICAL THERAPIST

## 2025-07-15 PROCEDURE — G2211 COMPLEX E/M VISIT ADD ON: HCPCS | Performed by: INTERNAL MEDICINE

## 2025-07-15 PROCEDURE — 98006 SYNCH AUDIO-VIDEO EST MOD 30: CPT | Performed by: INTERNAL MEDICINE

## 2025-07-15 RX ORDER — AMOXICILLIN AND CLAVULANATE POTASSIUM 500; 125 MG/1; MG/1
1 TABLET, FILM COATED ORAL 2 TIMES DAILY
Qty: 20 TABLET | Refills: 0 | Status: SHIPPED | OUTPATIENT
Start: 2025-07-15 | End: 2025-07-25

## 2025-07-15 RX ORDER — PHENTERMINE HYDROCHLORIDE 15 MG/1
15 CAPSULE ORAL EVERY MORNING
Qty: 30 CAPSULE | Refills: 0 | Status: SHIPPED | OUTPATIENT
Start: 2025-07-15

## 2025-07-15 RX ORDER — TOPIRAMATE 50 MG/1
50 TABLET, FILM COATED ORAL DAILY
Qty: 90 TABLET | Refills: 1 | Status: SHIPPED | OUTPATIENT
Start: 2025-07-15

## 2025-07-15 ASSESSMENT — ANXIETY QUESTIONNAIRES
7. FEELING AFRAID AS IF SOMETHING AWFUL MIGHT HAPPEN: MORE THAN HALF THE DAYS
3. WORRYING TOO MUCH ABOUT DIFFERENT THINGS: MORE THAN HALF THE DAYS
8. IF YOU CHECKED OFF ANY PROBLEMS, HOW DIFFICULT HAVE THESE MADE IT FOR YOU TO DO YOUR WORK, TAKE CARE OF THINGS AT HOME, OR GET ALONG WITH OTHER PEOPLE?: SOMEWHAT DIFFICULT
5. BEING SO RESTLESS THAT IT IS HARD TO SIT STILL: NOT AT ALL
GAD7 TOTAL SCORE: 9
1. FEELING NERVOUS, ANXIOUS, OR ON EDGE: MORE THAN HALF THE DAYS
2. NOT BEING ABLE TO STOP OR CONTROL WORRYING: MORE THAN HALF THE DAYS
4. TROUBLE RELAXING: SEVERAL DAYS
6. BECOMING EASILY ANNOYED OR IRRITABLE: NOT AT ALL
GAD7 TOTAL SCORE: 9
7. FEELING AFRAID AS IF SOMETHING AWFUL MIGHT HAPPEN: MORE THAN HALF THE DAYS
GAD7 TOTAL SCORE: 9
IF YOU CHECKED OFF ANY PROBLEMS ON THIS QUESTIONNAIRE, HOW DIFFICULT HAVE THESE PROBLEMS MADE IT FOR YOU TO DO YOUR WORK, TAKE CARE OF THINGS AT HOME, OR GET ALONG WITH OTHER PEOPLE: SOMEWHAT DIFFICULT

## 2025-07-15 NOTE — PATIENT INSTRUCTIONS
Thank you for visiting the Primary Care Center today at the [facility]! The following is some information about our clinic:     Primary Care Center Frequently-Asked Questions    (1) How do I schedule appointments at the [facility]?     Primary Care--to schedule or make changes to an existing appointment, please call our primary care line at [phone].    Labs--to schedule a lab appointment at the [facility] you can use [employer] or call [phone]. If you have a [facility] location that is closer to home, you can reach out to that location for scheduling options.     Imaging--if you need to schedule a CT, X-ray, MRI, ultrasound, or other imaging study you can call [phone] to schedule at the [facility] or any other [facility] imaging location.     Referrals--if a referral to another specialty was ordered you can expect a phone call from their scheduling team. If you have not heard from them in a week, please call us or send us a [employer] message to check the status or get a scheduling number. Please note that this only applies to internal [facility] referrals. If the referral is external you would need to contact their office for scheduling.     (2) I have a question about my visit, who do I contact?     You can call us at the primary care line at [phone] to ask questions about your visit. You can also send a secure message through [employer], which is reviewed by clinic staff. Please note that [employer] messages have a twenty-four to forty-eight business hour turnaround time and should not be used for urgent concerns.    (3) How will I get the results of my tests?    If you are signed up for [employer] all tests will be released to you within twenty-four hours of resulting. Please allow three to five days for your doctor to review your results and place a note interpreting the results. If you do not have [employer] you will receive your  results through mail seven to ten business days following the return of the tests. Please note that if there should be any urgent or concerning results that your doctor or their registered nurse will reach out to you the same day as the tests come back. If you have follow up questions about your results or would like to discuss the results in detail please schedule a follow up with your provider either in person or virtually.     (4) How do I get refills of my prescriptions?     You should always first contact your pharmacy for refills of your medications. If submitting a refill request on "[employer]", please be sure to submit the request only once--repeat requests can cause delays in refill. If you are requesting a NEW medication or a medication related to new symptoms you will need to schedule an appointment with a provider prior to approval. Please note: Routine medication refills have up to one to three business day turnaround whereas controlled substances refills have up to five to seven business day turnaround.    (5) I have new symptoms, what do I do?     If you are having an immediate medical emergency, you should dial 911 for assistance.   For anything urgent that needs to be seen within a few hours to one day you should visit a local urgent care for assistance.  For non-urgent symptoms that need to be seen within a few days to a week you can schedule with an available provider in primary care by going to [employer] or calling [phone].   If you are not sure how serious your symptoms are or you would like to receive medical advice you can always call [phone] to speak with a triage nurse.

## 2025-07-15 NOTE — PROGRESS NOTES
Ada is a 54 year old who is being evaluated via a billable video visit.    How would you like to obtain your AVS? MyChart  If the video visit is dropped, the invitation should be resent by: Text to cell phone: [phone]  Will anyone else be joining your video visit? No      Are refills needed on medications prescribed by this physician? NO    Lizzie Duncan VVF      Assessment & Plan     Ada was seen today for recheck and mental health, back pain.    Diagnoses and all orders for this visit:    Acute recurrent maxillary sinusitis  -     amoxicillin-clavulanate (AUGMENTIN) 500-125 MG tablet; Take 1 tablet by mouth 2 times daily for 10 days.    Other chronic pain  Intractable chronic migraine without aura and with status migrainosus  [facility]    Weight gain  Initially we discontinued phentermine and topamax to ensure they weren't contributing to intractable headache.She reports no improvement in headache off of these meds and feels they were helping for weight management, attention/energy and sleep. Okay to resume at lower doses with close monitoring. Advised would not encourage further weight loss at this time, and it's appropriate to taper off GLP1a.  -     phentermine 15 MG capsule; Take 1 capsule (15 mg) by mouth every morning.  -     topiramate (TOPAMAX) 50 MG tablet; Take 1 tablet (50 mg) by mouth daily.    Insomnia, unspecified type  -     topiramate (TOPAMAX) 50 MG tablet; Take 1 tablet (50 mg) by mouth daily.    Inattention  -     Adult Mental Health [facility] Referral; Future  -     phentermine 15 MG capsule; Take 1 capsule (15 mg) by mouth every morning.    >30 minutes spent today performing chart review, history and exam, documentation and further activities as noted above, exclusive of any procedures or EKG interpretation      The longitudinal plan of care for the diagnosis(es)/condition(s) as documented were addressed during this visit. Due to the added complexity in  "care, I will continue to support Ada in the subsequent management and with ongoing continuity of care.            Subjective   Ada is a 54 year old, presenting for the following health issues:  RECHECK and Mental health, back pain      Video Start Time: 9:24 AM    History of Present Illness       Back Pain:  She presents for follow up of back pain. Patient's back pain is a chronic problem.  Location of back pain:  Right lower back, left lower back, right side of neck, left side of neck, right shoulder, left shoulder, right buttock, left buttock, right hip, left hip, right side of waist and left side of waist  Description of back pain: gnawing and shooting  Back pain spreads: right buttocks, left buttocks, left thigh, left knee, left foot, right shoulder, left shoulder, right side of neck and left side of neck    Since patient first noticed back pain, pain is: always present, but gets better and worse  Does back pain interfere with her job:  Yes       Mental Health Follow-up:  Patient presents to follow-up on Depression & Anxiety.Patient's depression since last visit has been:  Worse  The patient is having other symptoms associated with depression.  Patient's anxiety since last visit has been:  Worse  The patient is having other symptoms associated with anxiety.  Any significant life events: No  Patient is feeling anxious or having panic attacks.  Patient has no concerns about alcohol or drug use.    Headaches:   Since the patient's last clinic visit, headaches are: worsened  The patient is getting headaches:  Daily  She is not able to do normal daily activities when she has a migraine.  The patient is taking the following rescue/relief medications:  Naproxyn (Aleve), Tylenol, sumatriptan (Imitrex) and Maxalt   Patient states \"The relief is inconsistent\" from the rescue/relief medications.   The patient is taking the following medications to prevent migraines:  Other  In the past 4 weeks, the patient has gone " "to an Urgent Care or Emergency Room 0 times times due to headaches.    She eats 2-3 servings of fruits and vegetables daily.She consumes 0 sweetened beverage(s) daily.She exercises with enough effort to increase her heart rate 10 to 19 minutes per day.  She exercises with enough effort to increase her heart rate 3 or less days per week.   She is taking medications regularly.     HA worse, cranial sacral stim last week, seeing Dr Nguyen, Dr. Martinez, Dr. Coronado  Had repeat MRI which showed no clear chiari malformation  Had occipital nerve blocks  Trying to get approved for Vyepti  Feeling overwhelmed  For sleep taking ramelton, tizanidine  Stopped topiramate, was helping with sleep, perhaps not helping memory  She is titrating off of wegovy, 0.5 QOW  Weight stable, nutrition is reported to be \"clean\"  She is wondering about ADD, now off phentermine feeling a bit worse  Off celebrex  She reports nasal drainage, pain in face, no fevers, uses allegra D, flonase, sinus washes      Topamax, phenermine  Zofran QTC  insomnia                Objective           Vitals:  No vitals were obtained today due to virtual visit.    Physical Exam   GENERAL: alert and no distress  EYES: Eyes grossly normal to inspection.  No discharge or erythema, or obvious scleral/conjunctival abnormalities.  RESP: No audible wheeze, cough, or visible cyanosis.    SKIN: Visible skin clear. No significant rash, abnormal pigmentation or lesions.  NEURO: Cranial nerves grossly intact.  Mentation and speech appropriate for age.  PSYCH: Appropriate affect, tone, and pace of words          Video-Visit Details    Type of service:  Video Visit   Video End Time:9:51 AM  Originating Location (pt. Location): Home    Distant Location (provider location):  Off-site  Platform used for Video Visit: Sobeida  Signed Electronically by: Ellyn Rivera MD    "

## 2025-07-16 ENCOUNTER — PATIENT OUTREACH (OUTPATIENT)
Dept: CARE COORDINATION | Facility: CLINIC | Age: 55
End: 2025-07-16
Payer: COMMERCIAL

## 2025-07-17 NOTE — PROGRESS NOTES
[facility]                                    Progress Note    Patient Name: Ada Welch  Date: 7/15/25         Service Type: Individual      Session Start Time:     1000 Session End Time:  1100     Session Length:    60    Session #:   6    Attendees: Client attended alone    Service Modality:  Video Visit:      Provider verified identity through the following two step process.  Patient provided:  Patient is known previously to provider    Telemedicine Visit: The patient's condition can be safely assessed and treated via synchronous audio and visual telemedicine encounter.      Reason for Telemedicine Visit: Patient has requested telehealth visit and Services only offered telehealth    Originating Site (Patient Location): Patient's home    Distant Site (Provider Location): [facility]    Consent:  The patient/guardian has verbally consented to: the potential risks and benefits of telemedicine (video visit) versus in person care; bill my insurance or make self-payment for services provided; and responsibility for payment of non-covered services.     Patient would like the video invitation sent by:  My Chart    Mode of Communication:  Video Conference via Amwell    Distant Location (Provider):  On-site    As the provider I attest to compliance with applicable laws and regulations related to telemedicine.    DATA  Extended Session (53+ minutes):   - High distress and under complex circumstances.  See Data section for details  Interactive Complexity: No  Crisis: No         Progress Since Last Session (Related to Symptoms / Goals / Homework):   Symptoms: Patient reports a increase in her anxiety, depression and feelings of being overwhelmed impacted by her chronic health/pain symptoms    Homework: Partially completed      Episode of Care Goals: Satisfactory progress - ACTION (Actively working towards change); Intervened by reinforcing change plan / affirming steps  "taken     Current / Ongoing Stressors and Concerns:    Processed patients feelings of being overwhelmed and the impact of her physical health symptoms on mental health.  Also processed the changes/adjustments she has had to make in the past year and her mindset of \"ornelas up and push through\".  Discussed how that can lead to burnout and the feelings of being overwhelmed or shut down.  Processed importance of pacing, self-care, self-acceptance, boundaries and cognitive restructuring skills.  Also processed importance of showing those skills to her younger daughter that also struggles with chronic health conditions.      Treatment Objective(s) Addressed in This Session:   identify 25 strategies for managing pain and follow up with recommended medical treatments and appointments with Pain Center provider, Increase interest, engagement, and pleasure in doing things  Identify negative self-talk and behaviors: challenge core beliefs, myths, and actions     Intervention:   CBT: cognitive restructuring, self-care, boundaries    Assessments completed prior to visit:  The following assessments were completed by patient for this visit:  PHQ9:       8/15/2017     7:36 AM 9/21/2017     4:59 PM 10/23/2019     8:01 AM 1/3/2020     8:38 AM 11/13/2020    11:38 AM 10/31/2024    10:41 AM 4/8/2025     3:52 PM   PHQ-9 SCORE   PHQ-9 Total Score MyChart 0 0  2 (Minimal depression)   9 (Mild depression)   PHQ-9 Total Score 0  0  0 2 8 8 9        Patient-reported    Data saved with a previous flowsheet row definition     GAD7:       11/13/2020    11:38 AM 8/5/2021     6:53 AM 7/12/2022     9:03 AM 10/31/2024    10:41 AM 4/8/2025     3:53 PM 5/21/2025     2:30 PM 7/15/2025     9:12 AM   SAMANTHA-7 SCORE   Total Score  0 (minimal anxiety) 1 (minimal anxiety)  1 (minimal anxiety) 1 (minimal anxiety) 9 (mild anxiety)   Total Score 0 0 1 2 1  1  9        Patient-reported     PROMIS 10-Global Health (only subscores and total score):       8/29/2016     " 1:38 AM 9/2/2016     2:52 PM 4/26/2018    10:46 AM 7/10/2018     8:45 PM 9/20/2022    10:03 PM 4/8/2025     4:41 PM   PROMIS-10 Scores Only   Global Mental Health Score   11 13 15 13    Global Physical Health Score  8  7 6 8 9    PROMIS TOTAL - SUBSCORES   18 19 23 22    Global Physical Health Score : Raw Score 5 (SUM : G03 - G06 - G07 - G08)        Global Mental Health Score : Raw Score 9 (SUM : G02 - G04 - G05 - G10)        Total (Physical + Mental Health Score) 14            Patient-reported    Data saved with a previous flowsheet row definition     [place] Suicide Severity Rating Scale (Lifetime/Recent)      12/17/2018     9:02 AM 3/3/2019     5:56 PM 6/23/2024     6:34 PM 7/2/2024    10:26 AM 4/9/2025     4:00 PM   [place] Suicide Severity Rating (Lifetime/Recent)   Q1 Wish to be Dead (Lifetime)     No   Q2 Non-Specific Active Suicidal Thoughts (Lifetime)     No   Q1 Wished to be Dead (Past Month) no  no  0-->no 0-->no 0-->no   Q2 Suicidal Thoughts (Past Month) no  no  0-->no 0-->no 0-->no   Q6 Suicide Behavior (Lifetime) no  no  0-->no 0-->no 0-->no   Level of Risk per Screen   no risks indicated  no risks indicated  no risks indicated        Data saved with a previous flowsheet row definition         ASSESSMENT: Current Emotional / Mental Status (status of significant symptoms):   Risk status (Self / Other harm or suicidal ideation)   Patient denies current fears or concerns for personal safety.   Patient denies current or recent suicidal ideation or behaviors.   Patient denies current or recent homicidal ideation or behaviors.   Patient denies current or recent self injurious behavior or ideation.   Patient denies other safety concerns.   Patient reports there has been no change in risk factors since their last session.     Patient reports there has been no change in protective factors since their last session.     Recommended that patient call 911 or go to the local ED should there be a change in any of  these risk factors     Appearance:   Appropriate    Eye Contact:   Good    Psychomotor Behavior: Normal    Attitude:   Cooperative    Orientation:   All   Speech    Rate / Production: Normal     Volume:  Normal    Mood:    Depressed    Affect:    Appropriate  Tearful   Thought Content:  Clear    Thought Form:  Coherent  Logical    Insight:    Good      Medication Review:   No changes to current psychiatric medication(s)     Medication Compliance:   Yes     Changes in Health Issues:   Yes: Pain, Associated Psychological Distress     Chemical Use Review:   Substance Use: Chemical use reviewed, no active concerns identified      Tobacco Use: No current tobacco use.      Diagnosis:  1. Adjustment disorder with mixed anxiety and depressed mood    2. Chronic pain syndrome      Rule out- PTSD    Collateral Reports Completed:   Not Applicable    PLAN: (Patient Tasks / Therapist Tasks / Other)     Follow up with DR. Coronado as scheduled  Set boundaries with friends/family  Practice self-care strategies as discussed  Follow up with Arlyn in 2-4 weeks  Pacing strategies, cognitive restructuring skills daily    Padminirichard Gomez, [facility]                                                Individual Treatment Plan    Patient's Name: Ada Welch  YOB: 1970    Date of Creation: 5/29/25  Date Treatment Plan Last Reviewed/Revised:      DSM5 Diagnoses: Adjustment Disorders  309.28 (F43.23) With mixed anxiety and depressed mood;  Chronic Pain Syndrome  Rule Out- PTSD  Psychosocial / Contextual Factors:  Medical complexities, Trauma history, Relationship concerns, Limited social supports, Financial strain, Legal involvement.  Cultural and Contextual Factors: Pt is a 54 year old, ,  woman with 4 adult children and on teenage daughter w/ health concerns, lives independently, no learning disability, in the process of divorce, english primary language, Hindu background.     PROMIS 10-Global Health (only  subscores and total score):        8/29/2016     1:38 AM 9/2/2016     2:52 PM 4/26/2018    10:46 AM 7/10/2018     8:45 PM 9/20/2022    10:03 PM 4/8/2025     4:41 PM   PROMIS-10 Scores Only   Global Mental Health Score     11 13 15 13    Global Physical Health Score   8 7 6 8 9    PROMIS TOTAL - SUBSCORES     18 19 23 22    Global Physical Health Score : Raw Score 5 (SUM : G03 - G06 - G07 - G08)             Global Mental Health Score : Raw Score 9 (SUM : G02 - G04 - G05 - G10)             Total (Physical + Mental Health Score) 14                     Referral / Collaboration:  Referral to another professional/service is not indicated at this time..    Anticipated number of session for this episode of care: 26+  Anticipation frequency of session: Biweekly  Anticipated Duration of each session: 38-52 minutes  Treatment plan will be reviewed in 90 days or when goals have been changed.       MeasurableTreatment Goal(s) related to diagnosis / functional impairment(s)  Goal 1: Patient will reduce PHQ-9 and SAMANTHA-7 scores to 0    I will know I've met my goal when I have more tools to help me cope.      Objective #A (Patient Action)    Patient will identify   stressors which contribute to feelings of depression, identify   stressors which contribute to feelings of anxiety, participate in   activities to improve mood, identify   strategies to more effectively address stressors, focus on consistent sleep, eating, movement habits to support regulation, read   to increase education about mental health symptoms & how they manifest for patient (emotion recognition), identify  available supports, identify & practice   effective responses to stressors, engage in   enjoyable activities daily, take all medication as prescribed, identify   effective alternatives to maladaptive coping strategies , journal   steps towards progress each day, and learn and practice mindfulness techniques 3 days per week.    Status: New - Date: 5/29/25      Intervention(s)  Therapist will assign homework  , provide educational materials on depression, anxiety , teach about healthy boundaries.  , teach CBT strategies, encourage behavioral activation, teach sleep hygiene, teach interpersonal effectiveness skills, teach distress tolerance skills, teach mindfulness techniques , use motivational interviewing strategies, provide psychoeducation           Goal 2: Patient will improve coping skills to manage chronic pain/chronic health conditions    I will know I've met my goal when pain does not control my whole life.      Objective #A (Patient Action)    Status: New - Date: 5/29/25     Patient will identify   strategies for managing pain, learn   facts about chronic pain, practice activity pacing days/week, practice relaxation training 3 times/week, follow up with recommended medical treatments and appointments with all providers, decrease depression/anxiety from 9to 0 associated with chronic pain, improve quality of life/living with pain by  , and use  cognitive restructuring strategies around movement as bad/harmful.    Intervention(s)  Therapist will assign homework  , provide educational materials on chronic pain, teach distraction skills.  , teach gate-control theory of pain, demonstrate   strategies to manage pain, teach mindfulness techniques , use motivational interviewing strategies, recommend apps that support activity pacing  , provide psychoeducation.           Patient has reviewed and agreed to the above plan.      Padmini Gomez, [facility]  May 29 2025

## 2025-07-21 DIAGNOSIS — M96.1 POSTLAMINECTOMY SYNDROME: ICD-10-CM

## 2025-07-21 DIAGNOSIS — M96.1 LUMBAR POST-LAMINECTOMY SYNDROME: ICD-10-CM

## 2025-07-21 RX ORDER — TRAMADOL HYDROCHLORIDE 50 MG/1
50 TABLET ORAL EVERY 6 HOURS PRN
Qty: 65 TABLET | Refills: 0 | Status: SHIPPED | OUTPATIENT
Start: 2025-07-21

## 2025-07-21 NOTE — TELEPHONE ENCOUNTER
Received request for a refill(s) of     traMADol (ULTRAM) 50 MG tablet        Last dispensed from pharmacy on 7/1/2025    Patient's last office/virtual visit by prescribing provider on 5/21/2025  Next office/virtual appointment scheduled for 7/30/2025    Last urine drug screen date None on File  Current opioid agreement on file (completed within the last year) NoDate of opioid agreement: None on File    E-prescribe to [facility], [place] - [address] [place] [address]  pharmacy    Will route to [facility] for review and preparation of prescription(s).

## 2025-07-21 NOTE — TELEPHONE ENCOUNTER
Please sign on behalf of .    Pending Prescriptions:                       Disp   Refills    traMADol (ULTRAM) 50 MG tablet [Pharmacy *65 tab*0            Sig: TAKE 1 TABLET (50 MG) BY MOUTH EVERY 6 HOURS AS           NEEDED FOR PAIN.       E-prescribe to [facility], [place] - [address] [place] [address]

## 2025-07-22 NOTE — TELEPHONE ENCOUNTER
Please process a refill of DULOXETINE HCL 60MG CPEP  to     [facility], [place] - [address] [place] [id number]  Phone: [phone] Fax: [phone]

## 2025-07-23 ENCOUNTER — VIRTUAL VISIT (OUTPATIENT)
Dept: ENDOCRINOLOGY | Facility: CLINIC | Age: 55
End: 2025-07-23
Payer: COMMERCIAL

## 2025-07-23 DIAGNOSIS — E66.3 OVERWEIGHT (BMI 25.0-29.9): ICD-10-CM

## 2025-07-23 DIAGNOSIS — L65.9 HAIR LOSS: ICD-10-CM

## 2025-07-23 DIAGNOSIS — E03.9 ACQUIRED HYPOTHYROIDISM: Primary | ICD-10-CM

## 2025-07-23 DIAGNOSIS — R53.83 OTHER FATIGUE: ICD-10-CM

## 2025-07-23 PROCEDURE — 1125F AMNT PAIN NOTED PAIN PRSNT: CPT | Mod: 95 | Performed by: PHYSICIAN ASSISTANT

## 2025-07-23 PROCEDURE — 98005 SYNCH AUDIO-VIDEO EST LOW 20: CPT | Performed by: PHYSICIAN ASSISTANT

## 2025-07-23 RX ORDER — DULOXETIN HYDROCHLORIDE 60 MG/1
CAPSULE, DELAYED RELEASE ORAL
Qty: 90 CAPSULE | Refills: 1 | Status: SHIPPED | OUTPATIENT
Start: 2025-07-23

## 2025-07-23 RX ORDER — DULOXETIN HYDROCHLORIDE 30 MG/1
CAPSULE, DELAYED RELEASE ORAL
Qty: 90 CAPSULE | Refills: 1 | Status: SHIPPED | OUTPATIENT
Start: 2025-07-23

## 2025-07-23 NOTE — TELEPHONE ENCOUNTER
Received fax from pharmacy requesting refill(s) for     DULoxetine (CYMBALTA) 30 MG capsule  DULoxetine (CYMBALTA) 60 MG capsule    Date last filled:      DULoxetine (CYMBALTA) 30 MG capsule - 5/5/2025  DULoxetine (CYMBALTA) 60 MG capsule - 5/5/2025    Last Appt Date:  5/21/2025    Next Appt scheduled:  7/30/2025    Pharmacy:      [facility], [place] - [address] [place] [address] route for processing    Bobbi Tolbert [facility]

## 2025-07-23 NOTE — NURSING NOTE
Current patient location: [address] [place] [id number]    Is the patient currently in the state of [place]? YES    Visit mode: VIDEO    If the visit is dropped, the patient can be reconnected by:VIDEO VISIT: Text to cell phone:   Telephone Information:   Mobile [phone]       Will anyone else be joining the visit? NO  (If patient encounters technical issues they should call [phone]    Are changes needed to the allergy or medication list? No    Are refills needed on medications prescribed by this physician? Discuss with provider    Rooming Documentation:  Not applicable    Reason for visit: SARAI TREVINO

## 2025-07-23 NOTE — PROGRESS NOTES
Virtual Visit Details    Type of service:  Video Visit     Originating Location (pt. Location): Home    Distant Location (provider location):  Off-site  Platform used for Video Visit: [employer]  Assessment/Plan :   Hypothyroidism. Ada continues to struggle with fatigue and hair loss. She is worried that she will eventually lose so much hair that she won't have any left. She did notice a small improvement in her energy after starting levothyroxine but the fatigue quickly returned. We discussed that this is common, as we slowly adjust thyroid dosing. I would like to repeat thyroid testing and we can make adjustments based on the results. She has not had any adverse effects, it just sounds like we may need to increase the dose, slightly. A lab order was placed and I will contact her with the results. We will follow-up in 6 mos.   Obesity. Ada remains stable on 0.5 mg of Wegovy. She has continued to lose weight but this weight loss seems to be slowing. We discussed the adverse effects and she has not had any problems. We will increase the dose to 1 mg weekly. This should continue to help with ongoing weight loss. A new prescription was sent to her pharmacy. If she has any issues with the increase, she will contact our office.     Due to the COVID 19 pandemic this visit was a telephone/video visit in order to help prevent spread of infection in this patient and the general population. The patient gave verbal consent for the visit today. I have independently reviewed and interpreted labs, imaging as indicated.       Distant Location (provider location):  Off-site  Mode of Communication:  Video Conference via [employer]  Chart review/prep time 5    Joined the call at 7/23/2025, 10:08:50 am.  Left the call at 7/23/2025, 10:15:55 am.  You were on the call for 7 minutes 5 seconds.  16 minutes spent on the date of the encounter doing chart review, history and exam, documentation and further activities as noted  "above.      Chief complaint:  Ada is a 54 year old female who returns for follow-up of hypothyroidism and obesity.     I have reviewed Care Everywhere including [facility], [facility], [facility], [facility], [facility] , [facility], [facility] lab reports, imaging reports and provider notes as indicated.      HISTORY OF PRESENT ILLNESS  Ada continues to struggle with fatigue and hair loss. The hair loss has been occurring for the last couple of years. She was hopeful that the HRT and levothyroxine would help, but it has not. She did notice an improvement in her energy for a few weeks after starting the levothyroxine but then the fatigue returned. She also feels like her weight loss may be slowing, despite remaining consistent with 0.5 mg of Wegovy weekly. She has not had any problems with worsening anxiousness or irritability. She also has not had any issues with constipation or abdominal pain.     Ada has a complicated history that includes Tom-Danlos syndrome, history of hypothyroidism, obesity, POTS, HTN, lumbar and cervical radiculopathy, and migraines. She also has a history of gestational diabetes during 1 of her 5 pregnancies. In recent years, she has struggled with glucose fluctuations with significant hypoglycemia. She states that she can never go more than a few hours without food. Over time, this constant need to eat or snack, led to weight gain. In 2023, she was started on Wegovy to help with the weight and she finally felt \"normal.\" Her insurance did stop covering the medication in May of 2024. She had to discontinue it and ended up gaining back 50 lbs. She is back on the medication now and doing well. She also has a history of hypothyroidism.     Endocrine relevant labs are as follows:   Latest Reference Range & Units 04/14/25 12:20   Hemoglobin A1C 0.0 - 5.6 % 4.8      Latest Reference Range & Units 04/14/25 12:20   TSH 0.30 - 4.20 uIU/mL 3.30      Latest Reference " Range & Units 11/13/24 15:52   TSH 0.30 - 4.20 uIU/mL 1.65     REVIEW OF SYSTEMS    10 system ROS otherwise as per the HPI or negative    Past Medical History  Past Medical History:   Diagnosis Date    Allergic rhinitis 09/2007    Anxiety     Arthritis 11/01/2013    Found during search for cause of back pain    Autoimmune disease 2016    Immunosuppresive    Autonomic dysfunction     Bleeding disorder 2016    Bruise easily    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    Cervicalgia     Chronic sinusitis 00/2007    Diagnosed with chronic pansinusitis 2016    Coagulation disorder     factor 8    CSF leak     Chiari malformation    Depression     Tom-Danlos disease     Eosinophilic esophagitis 08/2018    Gastroesophageal reflux disease 3/1/2017    I have large hiatal hernia    Hiatal hernia 2016    Have adeline hiatel hernia    Hoarseness Unsure    It comes and goes    Hypertension     Hypothyroid     IBS (irritable bowel syndrome) with constipation     improved on Linzess    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    Irregular heart beat     Migraines 1/1/2007    Unsure of exact date    Nasal polyps 2013    Were revoved 03/2017, benign    Orthostatic hypotension     Other nervous system complications 1/2014    Autonomic nervous system disorder, neuralgia, neuropathy    Swelling, mass, or lump in head and neck 08/2016    Lymph node has been growing for last year    Thyroid disease 01/01/2008    Urinary incontinence     Urinary urgency        Medications  Current Outpatient Medications   Medication Sig Dispense Refill    acetaminophen (TYLENOL) 500 MG tablet Take 1,000 mg by mouth every 6 hours as needed for mild pain      albuterol (PROAIR HFA/PROVENTIL HFA/VENTOLIN HFA) 108 (90 Base) MCG/ACT inhaler Inhale 2 puffs into the lungs every 6 hours as needed for shortness of breath, wheezing or cough. 18 g 0    amoxicillin-clavulanate (AUGMENTIN) 500-125 MG tablet Take 1 tablet by  mouth 2 times daily for 10 days. 20 tablet 0    atorvastatin (LIPITOR) 20 MG tablet TAKE ONE TABLET BY MOUTH ONCE DAILY 90 tablet 3    budesonide-formoterol (SYMBICORT) 80-4.5 MCG/ACT Inhaler Inhale 2 puffs into the lungs 2 times daily. 10 g 0    buprenorphine (BUTRANS) 10 MCG/HR WK patch Place onto the skin once a week. 4 patch 1    cetirizine (ZYRTEC) 10 MG tablet Take 10 mg by mouth daily as needed       co-enzyme Q-10 100 MG CAPS capsule Take 100 mg by mouth daily      DULoxetine (CYMBALTA) 30 MG capsule Take 1 capsule (30 mg) by mouth at bedtime. TAKE ONE CAPSULE BY MOUTH EVERY NIGHT AT BEDTIME AND TAKE 60 MG CAPSULE IN THE MORNING 90 capsule 1    DULoxetine (CYMBALTA) 60 MG capsule TAKE ONE CAPSULE BY MOUTH EVERY MORNING AND TAKE 30 MG CAPSULE IN THE EVENING 90 capsule 1    EPINEPHrine (ANY BX GENERIC EQUIV) 0.3 MG/0.3ML injection 2-pack Inject 0.3 mLs (0.3 mg) into the muscle as needed for anaphylaxis May repeat one time in 5-15 minutes if response to initial dose is inadequate. 2 each 1    eptinezumab-jjmr (VYEPTI) 100 MG/ML injection Inject 1 mL (100 mg) into the vein every 3 months.      estradiol (VIVELLE-DOT) 0.025 MG/24HR bi-weekly patch Place 1 patch over 96 hours onto the skin twice a week. 24 patch 1    ferrous fumarate 65 mg, [place]. FE,-Vitamin C 125 mg (VITRON-C)  MG TABS tablet Take 1 tablet by mouth every other day 60 tablet 4    fluticasone (FLONASE) 50 MCG/ACT nasal spray SPRAY 2 SPRAYS INTO BOTH NOSTRILS DAILY 48 g 11    guanFACINE (TENEX) 1 MG tablet TAKE ONE TAB BY MOUTH ONCE NIGHTLY AT BEDTIME      HYDROmorphone (DILAUDID) 2 MG tablet Take 1 tablet (2 mg) by mouth daily as needed for severe pain. 10 tablet 0    hydrOXYzine HCl (ATARAX) 25 MG tablet Take 1 tablet (25 mg) by mouth every 6 hours as needed for anxiety. 180 tablet 1    levothyroxine (SYNTHROID/LEVOTHROID) 25 MCG tablet Take 1 tablet (25 mcg) by mouth every morning (before breakfast). 90 tablet 1    Lidocaine (LIDOCARE) 4 %  Patch Place 1-3 patches onto the skin every 24 hours To prevent lidocaine toxicity, patient should be patch free for 12 hrs daily. 20 patch 0    medical cannabis (Patient's own supply) 1 Dose See Admin Instructions. (The purpose of this order is to document that the patient reports taking medical cannabis.  This is not a prescription, and is not used to certify that the patient has a qualifying medical condition.)      medical cannabis (Patient's own supply.  Not a prescription) Take 1 Dose by mouth See Admin Instructions Capsule formulation - uses as needed      methylPREDNISolone (MEDROL DOSEPAK) 4 MG tablet therapy pack Follow Package Directions 21 tablet 0    Multiple Vitamins-Minerals (MULTIVITAMIN PO) Take 1 tablet by mouth daily       naloxone (NARCAN) 4 MG/0.1ML nasal spray Spray 1 spray (4 mg) into one nostril alternating nostrils once as needed for opioid reversal every 2-3 minutes until assistance arrives 0.2 mL 0    naproxen (NAPROSYN) 250 MG tablet Take 250 mg by mouth 2 times daily (with meals).      ondansetron (ZOFRAN ODT) 4 MG ODT tab Take 1 tablet (4 mg) by mouth every 8 hours as needed for nausea. 20 tablet 3    phentermine 15 MG capsule Take 1 capsule (15 mg) by mouth every morning. 30 capsule 0    Probiotic Product (PROBIOTIC DAILY PO) Take 6 packets by mouth every morning Five-Lac      progesterone (PROMETRIUM) 100 MG capsule Take 1 capsule (100 mg) by mouth daily. 90 capsule 4    propranolol ER (INDERAL LA) 120 MG 24 hr capsule Take 1 capsule (120 mg) by mouth daily. 90 capsule 3    ramelteon (ROZEREM) 8 MG tablet TAKE ONE TABLET BY MOUTH ONCE DAILY AT BEDTIME. 30 tablet 5    rizatriptan (MAXALT-MLT) 5 MG ODT TAKE 1-2 TABLETS (5-10 MG) BY MOUTH AT ONSET OF HEADACHE FOR MIGRAINE (MAX 30 MG IN 24 HOURS) 18 tablet 11    SUMAtriptan (IMITREX STATDOSE) 6 MG/0.5ML pen injector kit Inject 0.5 ml (6 mg) subcutaneously at onset of migraine, May repeat in 1 hour , Max 12 mg/24 hrs 2 kit 11    tiZANidine  (ZANAFLEX) 4 MG tablet One-half tab bedtime for two nights, one bedtime, may repeat after 4 hours 60 tablet 3    topiramate (TOPAMAX) 50 MG tablet Take 1 tablet (50 mg) by mouth daily. 90 tablet 1    traMADol (ULTRAM) 50 MG tablet TAKE 1 TABLET (50 MG) BY MOUTH EVERY 6 HOURS AS NEEDED FOR PAIN. 65 tablet 0    ubrogepant (UBRELVY) 100 MG tablet Take 1 tablet (100 mg) by mouth at onset of headache (headache). 10 tablet 11    vitamin B complex with vitamin C (STRESS TAB) tablet Take 1 tablet by mouth daily      VITAMIN D, CHOLECALCIFEROL, PO Take 2,000 Units by mouth daily         Allergies  Allergies   Allergen Reactions    Baclofen Other (See Comments)     Loss of muscle tone. Muscle weakness.    Bee Venom Shortness Of Breath, Palpitations, Anaphylaxis and Difficulty breathing     Patient does carry Epi-Pen    Chicken-Derived Products (Egg) Nausea and Diarrhea    Compazine [Prochlorperazine]      Extreme jittery    Food      Milk    Gabapentin      Dizzy    Gluten Meal GI Disturbance     Wheat bran and wheat    Pregabalin     Seasonal Allergies      Dust, mold       Family History  family history includes Asthma in her sister and son; Cancer in her father, paternal grandfather, and paternal grandmother; Connective Tissue Disorder in her mother and sister; Diabetes in her maternal grandmother, paternal grandfather, and paternal grandmother; Heart Disease in her maternal grandmother; Hypertension in her maternal grandmother; Migraines in her father, sister, sister, and sister; Thyroid Disease in her mother, sister, sister, and sister.    Social History  Social History     Tobacco Use    Smoking status: Former     Current packs/day: 0.00     Average packs/day: 0.2 packs/day for 22.9 years (4.6 ttl pk-yrs)     Types: Cigarettes     Start date: 1991     Quit date: 2013     Years since quittin.6     Passive exposure: Past    Smokeless tobacco: Never   Vaping Use    Vaping status: Some Days    Substances: THC    Substance Use Topics    Alcohol use: Yes     Comment: Occasionally    Drug use: Yes     Types: Marijuana     Comment: medical marijuana       Physical Exam  There is no height or weight on file to calculate BMI.  GENERAL: no distress  SKIN: Visible skin clear. No significant rash, abnormal pigmentation or lesions.  EYES: Eyes grossly normal to inspection.  No discharge or erythema, or obvious scleral/conjunctival abnormalities.  NECK: visible goiter is not present; no visible neck masses  RESP: No audible wheeze, cough, or visible cyanosis.  No visible retractions or increased work of breathing.    NEURO: Awake, alert, responds appropriately to questions.  Mentation and speech fluent.  PSYCH:affect normal and appearance well-groomed.

## 2025-07-23 NOTE — LETTER
7/23/2025      Ada Welch  [address] [facility] [address]      Dear Colleague,    Thank you for referring your patient, Ada Welch, to the [facility]. Please see a copy of my visit note below.    Virtual Visit Details    Type of service:  Video Visit     Originating Location (pt. Location): Home  {PROVIDER LOCATION On-site should be selected for visits conducted from your clinic location or adjoining [facility] hospital, academic office, or other nearby [facility] building. Off-site should be selected for all other provider locations, including home:[id number]}  Distant Location (provider location):  Off-site  Platform used for Video Visit: RedSeg[employer]  Assessment/Plan :   Hypothyroidism. Ada continues to struggle with fatigue and hair loss. She is worried that she will eventually lose so much hair that she won't have any left. She did notice a small improvement in her energy after starting levothyroxine but the fatigue quickly returned. We discussed that this is common, as we slowly adjust thyroid dosing. I would like to repeat thyroid testing and we can make adjustments based on the results. She has not had any adverse effects, it just sounds like we may need to increase the dose, slightly. A lab order was placed and I will contact her with the results. We will follow-up in 6 mos.   Obesity. Ada remains stable on 0.5 mg of Wegovy. She has continued to lose weight but this weight loss seems to be slowing. We discussed the adverse effects and she has not had any problems. We will increase the dose to 1 mg weekly. This should continue to help with ongoing weight loss. A new prescription was sent to her pharmacy. If she has any issues with the increase, she will contact our office.     Due to the COVID 19 pandemic this visit was a telephone/video visit in order to help prevent spread of infection in this patient and the general population. The patient gave verbal consent for the visit  today. I have independently reviewed and interpreted labs, imaging as indicated.     {PROVIDER LOCATION On-site should be selected for visits conducted from your clinic location or adjoining [facility] hospital, academic office, or other nearby [facility] building. Off-site should be selected for all other provider locations, including home:[id number]}  Distant Location (provider location):  Off-site  Mode of Communication:  Video Conference via [employer]  Chart review/prep time 5    Joined the call at 7/23/2025, 10:08:50 am.  Left the call at 7/23/2025, 10:15:55 am.  You were on the call for 7 minutes 5 seconds.  16 minutes spent on the date of the encounter doing chart review, history and exam, documentation and further activities as noted above.      Chief complaint:  Ada is a 54 year old female who returns for follow-up of hypothyroidism and obesity.     I have reviewed Care Everywhere including [facility], [facility], [facility], [place], [facility], [facility] , [facility], [place] lab reports, imaging reports and provider notes as indicated.      HISTORY OF PRESENT ILLNESS  Ada continues to struggle with fatigue and hair loss. The hair loss has been occurring for the last couple of years. She was hopeful that the HRT and levothyroxine would help, but it has not. She did notice an improvement in her energy for a few weeks after starting the levothyroxine but then the fatigue returned. She also feels like her weight loss may be slowing, despite remaining consistent with 0.5 mg of Wegovy weekly. She has not had any problems with worsening anxiousness or irritability. She also has not had any issues with constipation or abdominal pain.     Ada has a complicated history that includes Tom-Danlos syndrome, history of hypothyroidism, obesity, POTS, HTN, lumbar and cervical radiculopathy, and migraines. She also has a history of gestational diabetes during 1 of her 5 pregnancies. In recent years, she  "has struggled with glucose fluctuations with significant hypoglycemia. She states that she can never go more than a few hours without food. Over time, this constant need to eat or snack, led to weight gain. In 2023, she was started on Wegovy to help with the weight and she finally felt \"normal.\" Her insurance did stop covering the medication in May of 2024. She had to discontinue it and ended up gaining back 50 lbs. She is back on the medication now and doing well. She also has a history of hypothyroidism.     Endocrine relevant labs are as follows:   Latest Reference Range & Units 04/14/25 12:20   Hemoglobin A1C 0.0 - 5.6 % 4.8      Latest Reference Range & Units 04/14/25 12:20   TSH 0.30 - 4.20 uIU/mL 3.30      Latest Reference Range & Units 11/13/24 15:52   TSH 0.30 - 4.20 uIU/mL 1.65     REVIEW OF SYSTEMS    10 system ROS otherwise as per the HPI or negative    Past Medical History  Past Medical History:   Diagnosis Date     Allergic rhinitis 09/2007     Anxiety      Arthritis 11/01/2013    Found during search for cause of back pain     Autoimmune disease 2016    Immunosuppresive     Autonomic dysfunction      Bleeding disorder 2016    Bruise easily     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     Cervicalgia      Chronic sinusitis 00/2007    Diagnosed with chronic pansinusitis 2016     Coagulation disorder     factor 8     CSF leak     Chiari malformation     Depression      Tom-Danlos disease      Eosinophilic esophagitis 08/2018     Gastroesophageal reflux disease 3/1/2017    I have large hiatal hernia     Hiatal hernia 2016    Have adeline hiatel hernia     Hoarseness Unsure    It comes and goes     Hypertension      Hypothyroid      IBS (irritable bowel syndrome) with constipation     improved on Linzess     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     Irregular heart beat      Migraines 1/1/2007    Unsure of exact date     Nasal polyps 2013    Were revoved " 03/2017, benign     Orthostatic hypotension      Other nervous system complications 1/2014    Autonomic nervous system disorder, neuralgia, neuropathy     Swelling, mass, or lump in head and neck 08/2016    Lymph node has been growing for last year     Thyroid disease 01/01/2008     Urinary incontinence      Urinary urgency        Medications  Current Outpatient Medications   Medication Sig Dispense Refill     acetaminophen (TYLENOL) 500 MG tablet Take 1,000 mg by mouth every 6 hours as needed for mild pain       albuterol (PROAIR HFA/PROVENTIL HFA/VENTOLIN HFA) 108 (90 Base) MCG/ACT inhaler Inhale 2 puffs into the lungs every 6 hours as needed for shortness of breath, wheezing or cough. 18 g 0     amoxicillin-clavulanate (AUGMENTIN) 500-125 MG tablet Take 1 tablet by mouth 2 times daily for 10 days. 20 tablet 0     atorvastatin (LIPITOR) 20 MG tablet TAKE ONE TABLET BY MOUTH ONCE DAILY 90 tablet 3     budesonide-formoterol (SYMBICORT) 80-4.5 MCG/ACT Inhaler Inhale 2 puffs into the lungs 2 times daily. 10 g 0     buprenorphine (BUTRANS) 10 MCG/HR WK patch Place onto the skin once a week. 4 patch 1     cetirizine (ZYRTEC) 10 MG tablet Take 10 mg by mouth daily as needed        co-enzyme Q-10 100 MG CAPS capsule Take 100 mg by mouth daily       DULoxetine (CYMBALTA) 30 MG capsule Take 1 capsule (30 mg) by mouth at bedtime. TAKE ONE CAPSULE BY MOUTH EVERY NIGHT AT BEDTIME AND TAKE 60 MG CAPSULE IN THE MORNING 90 capsule 1     DULoxetine (CYMBALTA) 60 MG capsule TAKE ONE CAPSULE BY MOUTH EVERY MORNING AND TAKE 30 MG CAPSULE IN THE EVENING 90 capsule 1     EPINEPHrine (ANY BX GENERIC EQUIV) 0.3 MG/0.3ML injection 2-pack Inject 0.3 mLs (0.3 mg) into the muscle as needed for anaphylaxis May repeat one time in 5-15 minutes if response to initial dose is inadequate. 2 each 1     eptinezumab-jjmr (VYEPTI) 100 MG/ML injection Inject 1 mL (100 mg) into the vein every 3 months.       estradiol (VIVELLE-DOT) 0.025 MG/24HR  bi-weekly patch Place 1 patch over 96 hours onto the skin twice a week. 24 patch 1     ferrous fumarate 65 mg, [place]. FE,-Vitamin C 125 mg (VITRON-C)  MG TABS tablet Take 1 tablet by mouth every other day 60 tablet 4     fluticasone (FLONASE) 50 MCG/ACT nasal spray SPRAY 2 SPRAYS INTO BOTH NOSTRILS DAILY 48 g 11     guanFACINE (TENEX) 1 MG tablet TAKE ONE TAB BY MOUTH ONCE NIGHTLY AT BEDTIME       HYDROmorphone (DILAUDID) 2 MG tablet Take 1 tablet (2 mg) by mouth daily as needed for severe pain. 10 tablet 0     hydrOXYzine HCl (ATARAX) 25 MG tablet Take 1 tablet (25 mg) by mouth every 6 hours as needed for anxiety. 180 tablet 1     levothyroxine (SYNTHROID/LEVOTHROID) 25 MCG tablet Take 1 tablet (25 mcg) by mouth every morning (before breakfast). 90 tablet 1     Lidocaine (LIDOCARE) 4 % Patch Place 1-3 patches onto the skin every 24 hours To prevent lidocaine toxicity, patient should be patch free for 12 hrs daily. 20 patch 0     medical cannabis (Patient's own supply) 1 Dose See Admin Instructions. (The purpose of this order is to document that the patient reports taking medical cannabis.  This is not a prescription, and is not used to certify that the patient has a qualifying medical condition.)       medical cannabis (Patient's own supply.  Not a prescription) Take 1 Dose by mouth See Admin Instructions Capsule formulation - uses as needed       methylPREDNISolone (MEDROL DOSEPAK) 4 MG tablet therapy pack Follow Package Directions 21 tablet 0     Multiple Vitamins-Minerals (MULTIVITAMIN PO) Take 1 tablet by mouth daily        naloxone (NARCAN) 4 MG/0.1ML nasal spray Spray 1 spray (4 mg) into one nostril alternating nostrils once as needed for opioid reversal every 2-3 minutes until assistance arrives 0.2 mL 0     naproxen (NAPROSYN) 250 MG tablet Take 250 mg by mouth 2 times daily (with meals).       ondansetron (ZOFRAN ODT) 4 MG ODT tab Take 1 tablet (4 mg) by mouth every 8 hours as needed for nausea. 20  tablet 3     phentermine 15 MG capsule Take 1 capsule (15 mg) by mouth every morning. 30 capsule 0     Probiotic Product (PROBIOTIC DAILY PO) Take 6 packets by mouth every morning Five-Lac       progesterone (PROMETRIUM) 100 MG capsule Take 1 capsule (100 mg) by mouth daily. 90 capsule 4     propranolol ER (INDERAL LA) 120 MG 24 hr capsule Take 1 capsule (120 mg) by mouth daily. 90 capsule 3     ramelteon (ROZEREM) 8 MG tablet TAKE ONE TABLET BY MOUTH ONCE DAILY AT BEDTIME. 30 tablet 5     rizatriptan (MAXALT-MLT) 5 MG ODT TAKE 1-2 TABLETS (5-10 MG) BY MOUTH AT ONSET OF HEADACHE FOR MIGRAINE (MAX 30 MG IN 24 HOURS) 18 tablet 11     SUMAtriptan (IMITREX STATDOSE) 6 MG/0.5ML pen injector kit Inject 0.5 ml (6 mg) subcutaneously at onset of migraine, May repeat in 1 hour , Max 12 mg/24 hrs 2 kit 11     tiZANidine (ZANAFLEX) 4 MG tablet One-half tab bedtime for two nights, one bedtime, may repeat after 4 hours 60 tablet 3     topiramate (TOPAMAX) 50 MG tablet Take 1 tablet (50 mg) by mouth daily. 90 tablet 1     traMADol (ULTRAM) 50 MG tablet TAKE 1 TABLET (50 MG) BY MOUTH EVERY 6 HOURS AS NEEDED FOR PAIN. 65 tablet 0     ubrogepant (UBRELVY) 100 MG tablet Take 1 tablet (100 mg) by mouth at onset of headache (headache). 10 tablet 11     vitamin B complex with vitamin C (STRESS TAB) tablet Take 1 tablet by mouth daily       VITAMIN D, CHOLECALCIFEROL, PO Take 2,000 Units by mouth daily         Allergies  Allergies   Allergen Reactions     Baclofen Other (See Comments)     Loss of muscle tone. Muscle weakness.     Bee Venom Shortness Of Breath, Palpitations, Anaphylaxis and Difficulty breathing     Patient does carry Epi-Pen     Chicken-Derived Products (Egg) Nausea and Diarrhea     Compazine [Prochlorperazine]      Extreme jittery     Food      Milk     Gabapentin      Dizzy     Gluten Meal GI Disturbance     Wheat bran and wheat     Pregabalin      Seasonal Allergies      Dust, mold       Family History  family history  includes Asthma in her sister and son; Cancer in her father, paternal grandfather, and paternal grandmother; Connective Tissue Disorder in her mother and sister; Diabetes in her maternal grandmother, paternal grandfather, and paternal grandmother; Heart Disease in her maternal grandmother; Hypertension in her maternal grandmother; Migraines in her father, sister, sister, and sister; Thyroid Disease in her mother, sister, sister, and sister.    Social History  Social History     Tobacco Use     Smoking status: Former     Current packs/day: 0.00     Average packs/day: 0.2 packs/day for 22.9 years (4.6 ttl pk-yrs)     Types: Cigarettes     Start date: 1991     Quit date: 2013     Years since quittin.6     Passive exposure: Past     Smokeless tobacco: Never   Vaping Use     Vaping status: Some Days     Substances: THC   Substance Use Topics     Alcohol use: Yes     Comment: Occasionally     Drug use: Yes     Types: Marijuana     Comment: medical marijuana       Physical Exam  There is no height or weight on file to calculate BMI.  GENERAL: no distress  SKIN: Visible skin clear. No significant rash, abnormal pigmentation or lesions.  EYES: Eyes grossly normal to inspection.  No discharge or erythema, or obvious scleral/conjunctival abnormalities.  NECK: visible goiter is not present; no visible neck masses  RESP: No audible wheeze, cough, or visible cyanosis.  No visible retractions or increased work of breathing.    NEURO: Awake, alert, responds appropriately to questions.  Mentation and speech fluent.  PSYCH:affect normal and appearance well-groomed.                Again, thank you for allowing me to participate in the care of your patient.        Sincerely,        Noemy Veloz PA-C    Electronically signed

## 2025-07-30 ENCOUNTER — OFFICE VISIT (OUTPATIENT)
Dept: PALLIATIVE MEDICINE | Facility: OTHER | Age: 55
End: 2025-07-30
Payer: COMMERCIAL

## 2025-07-30 ENCOUNTER — TELEPHONE (OUTPATIENT)
Dept: PALLIATIVE MEDICINE | Facility: OTHER | Age: 55
End: 2025-07-30

## 2025-07-30 VITALS — DIASTOLIC BLOOD PRESSURE: 65 MMHG | HEART RATE: 67 BPM | OXYGEN SATURATION: 100 % | SYSTOLIC BLOOD PRESSURE: 120 MMHG

## 2025-07-30 DIAGNOSIS — Z79.899 HIGH RISK MEDICATION USE: Primary | ICD-10-CM

## 2025-07-30 DIAGNOSIS — G44.209 TENSION-TYPE HEADACHE, NOT INTRACTABLE, UNSPECIFIED CHRONICITY PATTERN: ICD-10-CM

## 2025-07-30 DIAGNOSIS — G96.00 CEREBROSPINAL FLUID LEAK: ICD-10-CM

## 2025-07-30 LAB
CANNABINOIDS UR QL SCN: ABNORMAL
CREAT UR-MCNC: 131 MG/DL
ETHANOL UR QL SCN: NORMAL

## 2025-07-30 PROCEDURE — 1125F AMNT PAIN NOTED PAIN PRSNT: CPT | Performed by: ANESTHESIOLOGY

## 2025-07-30 PROCEDURE — 3078F DIAST BP <80 MM HG: CPT | Performed by: ANESTHESIOLOGY

## 2025-07-30 PROCEDURE — 99215 OFFICE O/P EST HI 40 MIN: CPT | Performed by: ANESTHESIOLOGY

## 2025-07-30 PROCEDURE — 80307 DRUG TEST PRSMV CHEM ANLYZR: CPT | Performed by: ANESTHESIOLOGY

## 2025-07-30 PROCEDURE — G2211 COMPLEX E/M VISIT ADD ON: HCPCS | Performed by: ANESTHESIOLOGY

## 2025-07-30 PROCEDURE — G0463 HOSPITAL OUTPT CLINIC VISIT: HCPCS | Performed by: ANESTHESIOLOGY

## 2025-07-30 PROCEDURE — 3074F SYST BP LT 130 MM HG: CPT | Performed by: ANESTHESIOLOGY

## 2025-07-30 RX ORDER — ACETAZOLAMIDE 125 MG/1
TABLET ORAL
Qty: 90 TABLET | Refills: 3 | Status: SHIPPED | OUTPATIENT
Start: 2025-07-30

## 2025-07-30 RX ORDER — KETAMINE HCL 100 %
POWDER (GRAM) MISCELLANEOUS
Qty: 30 G | Refills: 3 | Status: SHIPPED | OUTPATIENT
Start: 2025-07-30

## 2025-07-30 ASSESSMENT — PAIN SCALES - GENERAL: PAINLEVEL_OUTOF10: SEVERE PAIN (7)

## 2025-07-30 NOTE — PROGRESS NOTES
Patient presents to the clinic today for a visit  with HARPER LARRY MD            2/17/2023    10:25 AM 10/31/2024     9:46 AM 7/30/2025     1:36 PM   PEG Score   PEG Total Score 7.67 5.67 7.33       UDS/CSA-Needs ?     Medications-tramadol buprenorphine     QUESTIONS:    Amy Escalante MA  [facility] [place]

## 2025-07-30 NOTE — PROGRESS NOTES
"[facility] Follow-up    Date of visit: 7/30/2025    Chief complaint:   Chief Complaint   Patient presents with    Pain    Pain Management       Seen in follow-up from initial evaluation for complex headaches, spinal pain, related to either Danlos syndrome.    History of present illness  Aad Welch, 54-year-old female  - Chronic pain cycle ongoing, unable to break latest pain cycle despite prior hydromorphone trial, minimal impact  - Pain described as severe, affecting knees, hips, lower back, back of head/neck; sensation of \"drill\" coring out joints and \"10 lbs of pressure in a 5 lb container\" in head  - Intractable headache since September 2024, worsens with time upright; associated with numbness in face, coordination issues in hands, spreading to outside of hands and feet, and vision blurring  - Headaches primarily in occipital region, sometimes radiating forward with sinus pressure; multiple headache types reported  - Headaches and pain exacerbated by barometric pressure changes, especially during storms; unable to read books for over a year and a half due to vision issues  - History of Tom-Danlos Syndrome (EDS) and prior diagnosis of borderline Chiari malformation (diagnosed by Dr. Sofia in 2018 or 2019)  - Multiple MRIs performed, including upright MRI for Dr. Juares; no formal review or comparison of upright MRIs; static MRI reported no Chiari malformation  - Calcification noted on MRI in June 2025 and previously in 2006; septum surgery performed in 2006 for similar issues  - History of cerebrospinal fluid (CSF) leaks; several blood patches performed by Dr. Nguyen and Dr. Dutta, with variable duration of relief (days to weeks); last blood patch over a year ago, last one by Dr. Eaton did not help  - Recent episodes of clear fluid leakage from nose when bending over, described as \"pouring\"  - History of intractable headaches managed in 2006 " with week-long DHE infusion; Vivepti infusion not started due to insurance issues  - Ongoing physical therapy with [facility], some transient improvement but not sustained; severe initial findings with no pulse or movement, described as one of the worst cases by therapist  - Butrans 10 mcg patch provides significant mobility benefit; missed dose resulted in near immobility; no higher dose tried  - Tramadol used (morning, night, and midday as needed), not effective for pain control  - Medical cannabis tablets used, effective for muscle release and euphoria, but cause significant sedation, limiting daytime use  - Tizanidine more effective than Flexeril for muscle relaxation  - Occipital nerve blocks and Botox provided improvement in some headache types, especially post-MRI pain; persistent constant pressure remains in occipital region  - History of frequent forgetfulness and difficulty managing daily life, attributed to pain and headaches  - Recently , primary caregiver for 14-year-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 Dutta s team  - No prior use of lidocaine or ketamine nasal sprays  - No history of butalbital use except as discussed with Dr. Nguyen, who limited use due to rebound concerns  - Reports regular water intake of  ounces per day     Physical exam  - MUSCULOSKELETAL: Examination revealed absent pulse and immobility initially, noted improvement after physical therapy sessions.     Results  - Upright MRI: No Chiari malformation detected  - MRI (June): Calcification observed  - Static MRI: No Chiari malformation detected  - Venogram and angiogram: Several performed a year ago  - Occipital nerve blocks: Reported improvement in headache symptoms     Assessment & Plan  High risk medication use:  - Increase Butrans patch to 15 micrograms. Monitor response after 2-3 weeks.     Intractable headache:  - Persistent headache with  pressure in the occipital area, worsened by being upright. Previous treatments include occipital nerve blocks and Botox with some improvement.  - Prescribe lidocaine nasal spray and ketamine nasal spray for headache management. Consider acetazolamide for potential CSF involvement.     Tom-Danlos Syndrome (EDS):  - Diagnosis of EDS with associated symptoms.  - Continue physical therapy with [place]. Consider craniosacral therapy.     Suspected cerebrospinal fluid (CSF) leak:  - History of CSF leaks with previous blood patches providing temporary relief.  - Prescribe acetazolamide 250 mg twice daily, potentially increasing to three times daily. Follow up with Doctor Juares regarding upright MRI results.            Medications:  Current Outpatient Medications   Medication Sig Dispense Refill    albuterol (PROAIR HFA/PROVENTIL HFA/VENTOLIN HFA) 108 (90 Base) MCG/ACT inhaler Inhale 2 puffs into the lungs every 6 hours as needed for shortness of breath, wheezing or cough. 18 g 0    atorvastatin (LIPITOR) 20 MG tablet TAKE ONE TABLET BY MOUTH ONCE DAILY 90 tablet 3    budesonide-formoterol (SYMBICORT) 80-4.5 MCG/ACT Inhaler Inhale 2 puffs into the lungs 2 times daily. 10 g 0    buprenorphine (BUTRANS) 10 MCG/HR WK patch Place onto the skin once a week. 4 patch 1    cetirizine (ZYRTEC) 10 MG tablet Take 10 mg by mouth daily as needed       co-enzyme Q-10 100 MG CAPS capsule Take 100 mg by mouth daily      DULoxetine (CYMBALTA) 30 MG capsule TAKE ONE CAPSULE BY MOUTH EVERY NIGHT AT BEDTIME AND TAKE A 60 MG CAPSULE IN THE MORNING 90 capsule 1    DULoxetine (CYMBALTA) 60 MG capsule TAKE ONE CAPSULE BY MOUTH EVERY MORNING AND TAKE 30 MG CAPSULE IN THE EVENING 90 capsule 1    EPINEPHrine (ANY BX GENERIC EQUIV) 0.3 MG/0.3ML injection 2-pack Inject 0.3 mLs (0.3 mg) into the muscle as needed for anaphylaxis May repeat one time in 5-15 minutes if response to initial dose is inadequate. 2 each 1    eptinezumab-jj (VYEPTI) 100  MG/ML injection Inject 1 mL (100 mg) into the vein every 3 months.      estradiol (VIVELLE-DOT) 0.025 MG/24HR bi-weekly patch Place 1 patch over 96 hours onto the skin twice a week. 24 patch 1    ferrous fumarate 65 mg, [place]. FE,-Vitamin C 125 mg (VITRON-C)  MG TABS tablet Take 1 tablet by mouth every other day 60 tablet 4    fluticasone (FLONASE) 50 MCG/ACT nasal spray SPRAY 2 SPRAYS INTO BOTH NOSTRILS DAILY 48 g 11    guanFACINE (TENEX) 1 MG tablet TAKE ONE TAB BY MOUTH ONCE NIGHTLY AT BEDTIME      hydrOXYzine HCl (ATARAX) 25 MG tablet Take 1 tablet (25 mg) by mouth every 6 hours as needed for anxiety. 180 tablet 1    levothyroxine (SYNTHROID/LEVOTHROID) 25 MCG tablet Take 1 tablet (25 mcg) by mouth every morning (before breakfast). 90 tablet 1    Lidocaine (LIDOCARE) 4 % Patch Place 1-3 patches onto the skin every 24 hours To prevent lidocaine toxicity, patient should be patch free for 12 hrs daily. 20 patch 0    medical cannabis (Patient's own supply) 1 Dose See Admin Instructions. (The purpose of this order is to document that the patient reports taking medical cannabis.  This is not a prescription, and is not used to certify that the patient has a qualifying medical condition.)      medical cannabis (Patient's own supply.  Not a prescription) Take 1 Dose by mouth See Admin Instructions Capsule formulation - uses as needed      methylPREDNISolone (MEDROL DOSEPAK) 4 MG tablet therapy pack Follow Package Directions 21 tablet 0    Multiple Vitamins-Minerals (MULTIVITAMIN PO) Take 1 tablet by mouth daily       naloxone (NARCAN) 4 MG/0.1ML nasal spray Spray 1 spray (4 mg) into one nostril alternating nostrils once as needed for opioid reversal every 2-3 minutes until assistance arrives 0.2 mL 0    ondansetron (ZOFRAN ODT) 4 MG ODT tab Take 1 tablet (4 mg) by mouth every 8 hours as needed for nausea. 20 tablet 3    Probiotic Product (PROBIOTIC DAILY PO) Take 6 packets by mouth every morning Five-Lac       progesterone (PROMETRIUM) 100 MG capsule Take 1 capsule (100 mg) by mouth daily. 90 capsule 4    propranolol ER (INDERAL LA) 120 MG 24 hr capsule Take 1 capsule (120 mg) by mouth daily. 90 capsule 3    ramelteon (ROZEREM) 8 MG tablet TAKE ONE TABLET BY MOUTH ONCE DAILY AT BEDTIME. 30 tablet 5    rizatriptan (MAXALT-MLT) 5 MG ODT TAKE 1-2 TABLETS (5-10 MG) BY MOUTH AT ONSET OF HEADACHE FOR MIGRAINE (MAX 30 MG IN 24 HOURS) 18 tablet 11    Semaglutide-Weight Management (WEGOVY) 1 MG/0.5ML pen Inject 1 mg subcutaneously once a week. 2 mL 0    SUMAtriptan (IMITREX STATDOSE) 6 MG/0.5ML pen injector kit Inject 0.5 ml (6 mg) subcutaneously at onset of migraine, May repeat in 1 hour , Max 12 mg/24 hrs 2 kit 11    tiZANidine (ZANAFLEX) 4 MG tablet One-half tab bedtime for two nights, one bedtime, may repeat after 4 hours 60 tablet 3    topiramate (TOPAMAX) 50 MG tablet Take 1 tablet (50 mg) by mouth daily. 90 tablet 1    traMADol (ULTRAM) 50 MG tablet TAKE 1 TABLET (50 MG) BY MOUTH EVERY 6 HOURS AS NEEDED FOR PAIN. 65 tablet 0    ubrogepant (UBRELVY) 100 MG tablet Take 1 tablet (100 mg) by mouth at onset of headache (headache). 10 tablet 11    vitamin B complex with vitamin C (STRESS TAB) tablet Take 1 tablet by mouth daily      VITAMIN D, CHOLECALCIFEROL, PO Take 2,000 Units by mouth daily      acetaminophen (TYLENOL) 500 MG tablet Take 1,000 mg by mouth every 6 hours as needed for mild pain      HYDROmorphone (DILAUDID) 2 MG tablet Take 1 tablet (2 mg) by mouth daily as needed for severe pain. (Patient not taking: Reported on 7/30/2025) 10 tablet 0    naproxen (NAPROSYN) 250 MG tablet Take 250 mg by mouth 2 times daily (with meals). (Patient not taking: Reported on 7/23/2025)      phentermine 15 MG capsule Take 1 capsule (15 mg) by mouth every morning. 30 capsule 0           Physical Exam:  Blood pressure 120/65, pulse 67, SpO2 100%, not currently breastfeeding.    Alert, clear sensorium, no respiratory distress.  No pain  "behavior.    Affect congruent      Assessment:   Headaches are complex, followed through the headache clinic at the [place] and neurology addressing CGRP agents, triptans, other agent.  Analgesic rebound headaches concern.    Having Botox, and occipital nerve blocks.  She also reports component of Chiari headaches or CSF leaks.  [place] these concerns.  Reports having a standing MRI with Dr. Mcdonald in December without follow-up.  I cannot find that in the record.  Describes had some blood patches in the past that were quite helpful, able to be more active without the typical experience of the longer she is up during the day headaches worse.  Describes also having \"random\" episodes of fluid going out of her nose at times.    Discussed a plan for off label use of acetazolamide for possible CSF \"microleaks\" that she describes.    I will contact Dr. Caicedo to review the upright MRI.    Discussed the use of lidocaine and a nasal spray and ketamine and a nasal spray to help with flareup of the headaches.  Reviewed caution with advancing the Butrans patch which she otherwise might like to do and using tramadol to avoid analgesic rebound headaches.    She noted some possible benefit getting started with craniosacral therapy and has more regular schedule.    Discussed again the protocol using frequency specific microcurrent and collagen supplementation, has been overwhelmed to pursue this presently.    She will follow-up with our [facility] pharmacist in a few weeks and with me in several weeks.    Total time 55 minutes   minutes spent on the date of encounter doing chart review, history, and exam documentation and further activities as noted above.     Gagandeep Coronado MD  [facility]        "

## 2025-07-30 NOTE — PATIENT INSTRUCTIONS
[facility] Number:  [phone]  Call with any questions about your care and for scheduling assistance.   Calls are returned Monday through Friday between 8 AM and 4:30 PM. We usually get back to you within 2 business days depending on the issue/request.    If we are prescribing your medications:  For opioid medication refills, call the clinic or send a BONESUPPORThart message 7 days in advance.  Please include:  Name of requested medication  Name of the pharmacy.  For non-opioid medications, call your pharmacy directly to request a refill. Please allow 3-4 days to be processed.   Per [place] State Law:  All controlled substance prescriptions must be filled within 30 days of being written.    For those controlled substances allowing refills, pickup must occur within 30 days of last fill.      We believe regular attendance is key to your success in our program!    Any time you are unable to keep your appointment we ask that you call us at least 24 hours in advance to cancel.This will allow us to offer the appointment time to another patient.   Multiple missed appointments may lead to dismissal from the clinic.     PLAN:    Lidocaine nasal spray, one spray in nostril on side of headache, every six hours as needed    Ketamine nasal spray, 10%, one spray each nostril every six hours as needed    Acetozolamide for trial for headaches affected by posture, 125 mg one twice a day 2 weeks, may increase to one 3 times a day    Continue the Cranial sacral physical therapy    Butrans 10 mcg weekly, will decrease if sprays useful    Tramadol 50 mg three times a day as needed, decrease if sprays useful    Continue with [facility]    Dr. Coronado to contact Dr. Huber regarding MRI    Follow-up with Ab Franks, pharmacist in 4 weeks, and Dr. Coronado in 8 weeks

## 2025-07-30 NOTE — LETTER
Opioid / Opioid Plus Controlled Substance Agreement    This is an agreement between you and your provider about the safe and appropriate use of controlled substance/opioids prescribed by your care team. Controlled substances are medicines that can cause physical and mental dependence (abuse).    There are strict laws about having and using these medicines. We here at [facility] are committing to working with you in your efforts to get better. To support you in this work, we ll help you schedule regular office appointments for medicine refills. If we must cancel or change your appointment for any reason, we ll make sure you have enough medicine to last until your next appointment.     As a Provider, I will:  Listen carefully to your concerns and treat you with respect.   Recommend a treatment plan that I believe is in your best interest. This plan may involve therapies other than opioid pain medication.   Talk with you often about the possible benefits, and the risk of harm of any medicine that we prescribe for you.   Provide a plan on how to taper (discontinue or go off) using this medicine if the decision is made to stop its use.    As a Patient, I understand that opioid(s):   Are a controlled substance prescribed by my care team to help me function or work and manage my condition(s).   Are strong medicines and can cause serious side effects such as:  Drowsiness, which can seriously affect my driving ability  A lower breathing rate, enough to cause death  Harm to my thinking ability   Depression   Abuse of and addiction to this medicine  Need to be taken exactly as prescribed. Combining opioids with certain medicines or chemicals (such as illegal drugs, sedatives, sleeping pills, and benzodiazepines) can be dangerous or even fatal. If I stop opioids suddenly, I may have severe withdrawal symptoms.  Do not work for all types of pain nor for all patients. If they re not helpful, I may be asked to stop  them.        The risks, benefits and side effects of these medicine(s) were explained to me. I agree that:  I will take part in other treatments as advised by my care team. This may be psychiatry or counseling, physical therapy, behavioral therapy, group treatment or a referral to a specialist.     I will keep all my appointments. I understand that this is part of the monitoring of opioids. My care team may require an office visit for EVERY opioid/controlled substance refill. If I miss appointments or don t follow instructions, my care team may stop my medicine.    I will take my medicines as prescribed. I will not change the dose or schedule unless my care team tells me to. There will be no refills if I run out early.     I may be asked to come to the clinic and complete a urine drug test or complete a pill count at any time. If I don t give a urine sample or participate in a pill count, the care team may stop my medicine.    I will only receive prescriptions from this clinic for chronic pain. If I am treated by another provider for acute pain issues, I will tell them that I am taking opioid pain medication for chronic pain and that I have a treatment agreement with this provider. I will inform my [facility] care team within one business day if I am given a prescription for any pain medication by another healthcare provider. My [facility] care team can contact other providers and pharmacists about my use of any medicines.    It is up to me to make sure that I don t run out of my medicines on weekends or holidays. If my care team is willing to refill my opioid prescription without a visit, I must request refills only during office hours. Refills may take up to 3 business days to process. I will use one pharmacy to fill all my opioid and other controlled substance prescriptions. I will notify the clinic about any changes to my insurance or medication availability.    I am responsible for my  prescriptions. If the medicine/prescription is lost, stolen or destroyed, it will not be replaced. I also agree not to share controlled substance medicines with anyone.    I am aware I should not use any illegal or recreational drugs. I agree not to drink alcohol unless my care team says I can.       If I enroll in the [place] [facility] program, I will tell my care team prior to my next refill.     I will tell my care team right away if I become pregnant, have a new medical problem treated outside of my regular clinic, or have a change in my medications.    I understand that this medicine can affect my thinking, judgment and reaction time. Alcohol and drugs affect the brain and body, which can affect the safety of my driving. Being under the influence of alcohol or drugs can affect my decision-making, behaviors, personal safety, and the safety of others. Driving while impaired (DWI) can occur if a person is driving, operating, or in physical control of a car, motorcycle, boat, snowmobile, ATV, motorbike, off-road vehicle, or any other motor vehicle ([place] Statute 169A.20). I understand the risk if I choose to drive or operate any vehicle or machinery.    I understand that if I do not follow any of the conditions above, my prescriptions or treatment may be stopped or changed.          Opioids  What You Need to Know    What are opioids?   Opioids are pain medicines that must be prescribed by a doctor. They are also known as narcotics.     Examples are:   morphine (MS Contin, Florencia)  oxycodone (Oxycontin)  oxycodone and acetaminophen (Percocet)  hydrocodone and acetaminophen (Vicodin, Norco)   fentanyl patch (Duragesic)   hydromorphone (Dilaudid)   methadone  codeine (Tylenol #3)     What do opioids do well?   Opioids are best for severe short-term pain such as after a surgery or injury. They may work well for cancer pain. They may help some people with long-lasting (chronic) pain.     What do opioids NOT do  well?   Opioids never get rid of pain entirely, and they don t work well for most patients with chronic pain. Opioids don t reduce swelling, one of the causes of pain.                                    Other ways to manage chronic pain and improve function include:     Treat the health problem that may be causing pain  Anti-inflammation medicines, which reduce swelling and tenderness, such as ibuprofen (Advil, Motrin) or naproxen (Aleve)  Acetaminophen (Tylenol)  Antidepressants and anti-seizure medicines, especially for nerve pain  Topical treatments such as patches or creams  Injections or nerve blocks  Chiropractic or osteopathic treatment  Acupuncture, massage, deep breathing, meditation, visual imagery, aromatherapy  Use heat or ice at the pain site  Physical therapy   Exercise  Stop smoking  Take part in therapy       Risks and side effects     Talk to your doctor before you start or decide to keep taking opioids. Possible side effects include:    Lowering your breathing rate enough to cause death  Overdose, including death, especially if taking higher than prescribed doses  Worse depression symptoms; less pleasure in things you usually enjoy  Feeling tired or sluggish  Slower thoughts or cloudy thinking  Being more sensitive to pain over time; pain is harder to control  Trouble sleeping or restless sleep  Changes in hormone levels (for example, less testosterone)  Changes in sex drive or ability to have sex  Constipation  Unsafe driving  Itching and sweating  Dizziness  Nausea, throwing up and dry mouth    What else should I know about opioids?    Opioids may lead to dependence, tolerance, or addiction.    Dependence means that if you stop or reduce the medicine too quickly, you will have withdrawal symptoms. These include loose poop (diarrhea), jitters, flu-like symptoms, nervousness and tremors. Dependence is not the same as addiction.                     Tolerance means needing higher doses over time to  get the same effect. This may increase the chance of serious side effects.    Addiction is when people improperly use a substance that harms their body, their mind or their relations with others. Use of opiates can cause a relapse of addiction if you have a history of drug or alcohol abuse.    People who have used opioids for a long time may have a lower quality of life, worse depression, higher levels of pain and more visits to doctors.    You can overdose on opioids. Take these steps to lower your risk of overdose:    Recognize the signs:  Signs of overdose include decrease or loss of consciousness (blackout), slowed breathing, trouble waking up and blue lips. If someone is worried about overdose, they should call 911.    Talk to your doctor about Narcan (naloxone).   If you are at risk for overdose, you may be given a prescription for Narcan. This medicine very quickly reverses the effects of opioids.   If you overdose, a friend or family member can give you Narcan while waiting for the ambulance. They need to know the signs of overdose and how to give Narcan.     Don't use alcohol or street drugs.   Taking them with opioids can cause death.    Do not take any of these medicines unless your doctor says it s OK. Taking these with opioids can cause death:  Benzodiazepines, such as lorazepam (Ativan), alprazolam (Xanax) or diazepam (Valium)  Muscle relaxers, such as cyclobenzaprine (Flexeril)  Sleeping pills like zolpidem (Ambien)   Other opioids      How to keep you and other people safe while taking opioids:    Never share your opioids with others.  Opioid medicines are regulated by the Drug Enforcement Agency ([place]). Selling or sharing medications is a criminal act.    2. Be sure to store opioids in a secure place, locked up if possible. Young children can easily swallow them and overdose.    3. When you are traveling with your medicines, keep them in the original bottles. If you use a pill box, be sure you also  carry a copy of your medicine list from your clinic or pharmacy.    4. Safe disposal of opioids    Most pharmacies have places to get rid of medicine, called disposal kiosks. Medicine disposal options are also available in every [facility]. Search your county and  medication disposal  to find more options. You can find more details at:  [email or web address]://[email or web address]-medications     I agree that my provider, clinic care team, and pharmacy may work with any city, state or federal law enforcement agency that investigates the misuse, sale, or other diversion of my controlled medicine. I will allow my provider to discuss my care with, or share a copy of, this agreement with any other treating provider, pharmacy or emergency room where I receive care.    I have read this agreement and have asked questions about anything I did not understand.    _______________________________________________________  Patient Signature - Ada Welch _____________________                   Date     _______________________________________________________  Provider Signature - HARPER LARRY MD   _____________________                   Date     _______________________________________________________  Witness Signature (required if provider not present while patient signing)   _____________________                   Date

## 2025-07-31 ENCOUNTER — VIRTUAL VISIT (OUTPATIENT)
Dept: PALLIATIVE MEDICINE | Facility: OTHER | Age: 55
End: 2025-07-31
Attending: ANESTHESIOLOGY
Payer: COMMERCIAL

## 2025-07-31 DIAGNOSIS — G89.4 CHRONIC PAIN SYNDROME: ICD-10-CM

## 2025-07-31 DIAGNOSIS — F43.23 ADJUSTMENT DISORDER WITH MIXED ANXIETY AND DEPRESSED MOOD: Primary | ICD-10-CM

## 2025-07-31 PROCEDURE — 90834 PSYTX W PT 45 MINUTES: CPT | Mod: 95 | Performed by: SOCIAL WORKER

## 2025-08-06 RX ORDER — KETAMINE HCL 100 %
POWDER (GRAM) MISCELLANEOUS
Qty: 30 G | Refills: 3 | Status: SHIPPED | OUTPATIENT
Start: 2025-08-06

## 2025-08-07 ENCOUNTER — VIRTUAL VISIT (OUTPATIENT)
Dept: PALLIATIVE MEDICINE | Facility: OTHER | Age: 55
End: 2025-08-07
Payer: COMMERCIAL

## 2025-08-07 DIAGNOSIS — G89.4 CHRONIC PAIN SYNDROME: ICD-10-CM

## 2025-08-07 DIAGNOSIS — F43.23 ADJUSTMENT DISORDER WITH MIXED ANXIETY AND DEPRESSED MOOD: Primary | ICD-10-CM

## 2025-08-07 PROCEDURE — 90837 PSYTX W PT 60 MINUTES: CPT | Mod: 95 | Performed by: SOCIAL WORKER

## 2025-08-10 ENCOUNTER — TELEPHONE (OUTPATIENT)
Dept: INTERNAL MEDICINE | Facility: CLINIC | Age: 55
End: 2025-08-10
Payer: COMMERCIAL

## 2025-08-19 ENCOUNTER — THERAPY VISIT (OUTPATIENT)
Dept: PHYSICAL THERAPY | Facility: CLINIC | Age: 55
End: 2025-08-19
Payer: COMMERCIAL

## 2025-08-19 ENCOUNTER — TELEPHONE (OUTPATIENT)
Dept: PALLIATIVE MEDICINE | Facility: OTHER | Age: 55
End: 2025-08-19

## 2025-08-19 ENCOUNTER — LAB (OUTPATIENT)
Dept: LAB | Facility: CLINIC | Age: 55
End: 2025-08-19
Payer: COMMERCIAL

## 2025-08-19 DIAGNOSIS — G89.29 OTHER CHRONIC PAIN: ICD-10-CM

## 2025-08-19 DIAGNOSIS — R53.83 LOW ENERGY: ICD-10-CM

## 2025-08-19 DIAGNOSIS — G43.709 CHRONIC MIGRAINE W/O AURA W/O STATUS MIGRAINOSUS, NOT INTRACTABLE: ICD-10-CM

## 2025-08-19 DIAGNOSIS — G44.209 TENSION-TYPE HEADACHE, NOT INTRACTABLE, UNSPECIFIED CHRONICITY PATTERN: Primary | ICD-10-CM

## 2025-08-19 DIAGNOSIS — M96.1 POSTLAMINECTOMY SYNDROME: ICD-10-CM

## 2025-08-19 DIAGNOSIS — E03.9 ACQUIRED HYPOTHYROIDISM: ICD-10-CM

## 2025-08-19 DIAGNOSIS — R68.2 DRY MOUTH: ICD-10-CM

## 2025-08-19 LAB
ANION GAP SERPL CALCULATED.3IONS-SCNC: 9 MMOL/L (ref 7–15)
BUN SERPL-MCNC: 14.6 MG/DL (ref 6–20)
CALCIUM SERPL-MCNC: 8.9 MG/DL (ref 8.8–10.4)
CHLORIDE SERPL-SCNC: 108 MMOL/L (ref 98–107)
CREAT SERPL-MCNC: 0.99 MG/DL (ref 0.51–0.95)
EGFRCR SERPLBLD CKD-EPI 2021: 68 ML/MIN/1.73M2
GLUCOSE SERPL-MCNC: 84 MG/DL (ref 70–99)
HCO3 SERPL-SCNC: 26 MMOL/L (ref 22–29)
POTASSIUM SERPL-SCNC: 4.8 MMOL/L (ref 3.4–5.3)
SODIUM SERPL-SCNC: 143 MMOL/L (ref 135–145)
TSH SERPL DL<=0.005 MIU/L-ACNC: 2.09 UIU/ML (ref 0.3–4.2)

## 2025-08-19 PROCEDURE — 84443 ASSAY THYROID STIM HORMONE: CPT

## 2025-08-19 PROCEDURE — 97112 NEUROMUSCULAR REEDUCATION: CPT | Mod: GP | Performed by: PHYSICAL THERAPIST

## 2025-08-19 PROCEDURE — 83520 IMMUNOASSAY QUANT NOS NONAB: CPT

## 2025-08-19 PROCEDURE — 83088 ASSAY OF HISTAMINE: CPT | Mod: 90

## 2025-08-19 PROCEDURE — 80048 BASIC METABOLIC PNL TOTAL CA: CPT

## 2025-08-19 PROCEDURE — 86235 NUCLEAR ANTIGEN ANTIBODY: CPT

## 2025-08-19 PROCEDURE — 36415 COLL VENOUS BLD VENIPUNCTURE: CPT

## 2025-08-19 PROCEDURE — 97140 MANUAL THERAPY 1/> REGIONS: CPT | Mod: GP | Performed by: PHYSICAL THERAPIST

## 2025-08-19 PROCEDURE — 99000 SPECIMEN HANDLING OFFICE-LAB: CPT

## 2025-08-19 RX ORDER — TRAMADOL HYDROCHLORIDE 50 MG/1
50 TABLET ORAL EVERY 6 HOURS PRN
Qty: 65 TABLET | Refills: 0 | Status: SHIPPED | OUTPATIENT
Start: 2025-08-19

## 2025-08-20 LAB
ENA SS-A AB SER IA-ACNC: <0.5 U/ML
ENA SS-A AB SER IA-ACNC: NEGATIVE
ENA SS-B IGG SER IA-ACNC: <0.6 U/ML
ENA SS-B IGG SER IA-ACNC: NEGATIVE
S100A8/A9 PROTEIN: 317.5 NG/ML (ref 98.4–1427)

## 2025-08-20 RX ORDER — SEMAGLUTIDE 1 MG/.5ML
INJECTION, SOLUTION SUBCUTANEOUS
Qty: 2 ML | Refills: 0 | Status: SHIPPED | OUTPATIENT
Start: 2025-08-20

## 2025-08-23 LAB — HISTAMINE BLD-SCNC: 1128 NMOL/L

## 2025-08-25 ENCOUNTER — VIRTUAL VISIT (OUTPATIENT)
Dept: NEUROLOGY | Facility: CLINIC | Age: 55
End: 2025-08-25
Payer: COMMERCIAL

## 2025-08-25 DIAGNOSIS — R20.2 NUMBNESS AND TINGLING: ICD-10-CM

## 2025-08-25 DIAGNOSIS — G43.711 INTRACTABLE CHRONIC MIGRAINE WITHOUT AURA AND WITH STATUS MIGRAINOSUS: Primary | ICD-10-CM

## 2025-08-25 DIAGNOSIS — R20.0 NUMBNESS AND TINGLING: ICD-10-CM

## 2025-08-25 DIAGNOSIS — R41.840 INATTENTION: ICD-10-CM

## 2025-08-25 DIAGNOSIS — R63.5 WEIGHT GAIN: ICD-10-CM

## 2025-08-25 PROCEDURE — 1125F AMNT PAIN NOTED PAIN PRSNT: CPT | Mod: 95 | Performed by: PSYCHIATRY & NEUROLOGY

## 2025-08-25 PROCEDURE — 98007 SYNCH AUDIO-VIDEO EST HI 40: CPT | Performed by: PSYCHIATRY & NEUROLOGY

## 2025-08-25 RX ORDER — METOCLOPRAMIDE 10 MG/1
10 TABLET ORAL 3 TIMES DAILY PRN
COMMUNITY
Start: 2025-07-21 | End: 2025-08-25

## 2025-08-25 RX ORDER — ONDANSETRON 4 MG/1
4 TABLET, ORALLY DISINTEGRATING ORAL EVERY 8 HOURS PRN
Qty: 20 TABLET | Refills: 3 | Status: SHIPPED | OUTPATIENT
Start: 2025-08-25

## 2025-08-25 RX ORDER — METOCLOPRAMIDE 10 MG/1
10 TABLET ORAL 3 TIMES DAILY PRN
Qty: 90 TABLET | Refills: 11 | Status: SHIPPED | OUTPATIENT
Start: 2025-08-25

## 2025-08-25 ASSESSMENT — HEADACHE IMPACT TEST (HIT 6)
HIT6 TOTAL SCORE: 67
WHEN YOU HAVE HEADACHES HOW OFTEN IS THE PAIN SEVERE: VERY OFTEN
WHEN YOU HAVE A HEADACHE HOW OFTEN DO YOU WISH YOU COULD LIE DOWN: ALWAYS
HOW OFTEN HAVE YOU FELT TOO TIRED TO WORK BECAUSE OF YOUR HEADACHES: VERY OFTEN
HOW OFTEN DO HEADACHES LIMIT YOUR DAILY ACTIVITIES: VERY OFTEN
HOW OFTEN DID HEADACHS LIMIT CONCENTRATION ON WORK OR DAILY ACTIVITY: VERY OFTEN
HOW OFTEN HAVE YOU FELT FED UP OR IRRITATED BECAUSE OF YOUR HEADACHES: SOMETIMES

## 2025-08-25 ASSESSMENT — MIGRAINE DISABILITY ASSESSMENT (MIDAS)
HOW MANY DAYS DID YOU NOT DO HOUSEWORK BECAUSE OF HEADACHES: 30
HOW MANY DAYS WAS YOUR PRODUCTIVITY CUT IN HALF BECAUSE OF HEADACHES: 30
TOTAL SCORE: 125
HOW OFTEN WERE SOCIAL ACTIVITIES MISSED DUE TO HEADACHES: 5
HOW MANY DAYS DID YOU MISS WORK OR SCHOOL BECAUSE OF HEADACHES: 30
ON A SCALE FROM 0-10 ON AVERAGE HOW PAINFUL WERE HEADACHES: 9
HOW MANY DAYS IN THE PAST 3 MONTHS HAVE YOU HAD A HEADACHE: 45
HOW MANY DAYS WAS HOUSEWORK PRODUCTIVITY CUT IN HALF DUE TO HEADACHES: 30

## 2025-08-25 ASSESSMENT — PAIN SCALES - GENERAL: PAINLEVEL_OUTOF10: SEVERE PAIN (7)

## 2025-08-26 ENCOUNTER — THERAPY VISIT (OUTPATIENT)
Dept: PHYSICAL THERAPY | Facility: CLINIC | Age: 55
End: 2025-08-26
Payer: COMMERCIAL

## 2025-08-26 DIAGNOSIS — G43.709 CHRONIC MIGRAINE W/O AURA W/O STATUS MIGRAINOSUS, NOT INTRACTABLE: ICD-10-CM

## 2025-08-26 DIAGNOSIS — G44.209 TENSION-TYPE HEADACHE, NOT INTRACTABLE, UNSPECIFIED CHRONICITY PATTERN: Primary | ICD-10-CM

## 2025-08-26 PROCEDURE — 97112 NEUROMUSCULAR REEDUCATION: CPT | Mod: GP | Performed by: PHYSICAL THERAPIST

## 2025-08-26 PROCEDURE — 97140 MANUAL THERAPY 1/> REGIONS: CPT | Mod: GP | Performed by: PHYSICAL THERAPIST

## 2025-08-26 RX ORDER — PHENTERMINE HYDROCHLORIDE 15 MG/1
15 CAPSULE ORAL EVERY MORNING
Qty: 30 CAPSULE | Refills: 1 | Status: SHIPPED | OUTPATIENT
Start: 2025-08-26

## 2025-09-03 DIAGNOSIS — M96.1 POSTLAMINECTOMY SYNDROME: ICD-10-CM

## 2025-09-03 RX ORDER — BUPRENORPHINE 10 UG/H
PATCH TRANSDERMAL WEEKLY
Qty: 4 PATCH | Refills: 0 | Status: SHIPPED | OUTPATIENT
Start: 2025-09-03

## (undated) DEVICE — SU MONOCRYL 4-0 PS-2 27" UND Y426H

## (undated) DEVICE — LINEN TOWEL PACK X6 WHITE 5487

## (undated) DEVICE — DEVICE SUTURE PASSER 14GA WECK EFX EFXSP2

## (undated) DEVICE — TUBING IV EXT SET MICRO VOLUME MALE LL ADAPTER 36" 2N3345

## (undated) DEVICE — LINEN TOWEL PACK X5 5464

## (undated) DEVICE — GLOVE BIOGEL PI MICRO SZ 8.0 48580

## (undated) DEVICE — SYR 10ML PERFIX LL 332152

## (undated) DEVICE — SYR 10ML LL W/O NDL

## (undated) DEVICE — TRAY PAIN INJECTION 97A 640

## (undated) DEVICE — PREP CHLORAPREP W/ORANGE TINT 10.5ML 260715

## (undated) DEVICE — ENDO BITE BLOCK ADULT OMNI-BLOC

## (undated) DEVICE — SU PLEDGET SOFT TFE 13MMX7MMX1.5MM D7044

## (undated) DEVICE — SU VICRYL 0 UR-6 27" J603H

## (undated) DEVICE — DRSG GAUZE 4X4" 3033

## (undated) DEVICE — SYR 30ML SLIP TIP W/O NDL 302833

## (undated) DEVICE — TUBING SUCTION 10'X3/16" N510

## (undated) DEVICE — DRAPE BACK TABLE  44X90" 8377

## (undated) DEVICE — TRAY EPIDURAL NDL TUOHY PERIFIX 18GA 3.5" W/CATH 332200

## (undated) DEVICE — CONNECTOR BLAKE DRAIN SGL BCC1

## (undated) DEVICE — JELLY LUBRICATING SURGILUBE 2OZ TUBE

## (undated) DEVICE — SUCTION CATH AIRLIFE TRI-FLO W/CONTROL PORT 14FR  T60C

## (undated) DEVICE — BAG URINARY DRAIN LUBRISIL IC 4000ML LF 253509A

## (undated) DEVICE — GLOVE PROTEXIS POWDER FREE SMT 7.0  2D72PT70X

## (undated) DEVICE — NDL SPINAL 25GA 3.5" QUINCKE 405180

## (undated) DEVICE — SYR 05ML LL W/O NDL

## (undated) DEVICE — SYR 07ML EPIDURAL LOSS OF RESISTANCE PULSATOR 4905

## (undated) DEVICE — SU SILK 0 SH 30" K834H

## (undated) DEVICE — GLOVE EXAM NITRILE LG PF LATEX FREE 5064

## (undated) DEVICE — SPECIMEN CONTAINER 3OZ W/FORMALIN 59901

## (undated) DEVICE — ESU ELEC BLADE 2.75" COATED/INSULATED E1455

## (undated) DEVICE — LINEN GOWN XLG 5407

## (undated) DEVICE — KIT ENDO TURNOVER/PROCEDURE CARRY-ON 101822

## (undated) DEVICE — TUBE GASTROSTOMY PONSKY PULL DELUXE 20FR 000792

## (undated) DEVICE — NDL 25GA 1.5" 305127

## (undated) DEVICE — SUCTION DRY CHEST DRAIN OASIS 3600-100

## (undated) DEVICE — NDL COUNTER 20CT 31142493

## (undated) DEVICE — NDL SPINAL 22GA 3.5" QUINCKE 405181

## (undated) DEVICE — GOWN IMPERVIOUS 2XL BLUE

## (undated) DEVICE — DRSG BANDAID 1X3" FABRIC CURITY LATEX FREE KC44101

## (undated) DEVICE — LABEL MEDICATION SYSTEM 3303-P

## (undated) DEVICE — KIT ENDO FIRST STEP DISINFECTANT 200ML W/POUCH EP-4

## (undated) DEVICE — SUCTION TIP YANKAUER W/O VENT K86

## (undated) DEVICE — ENDO SCOPE WARMER SEAL  C3101

## (undated) DEVICE — ESU LIGASURE MARYLAND JAW OPEN SEALER/DVDR 5MMX37CM LF1737

## (undated) DEVICE — ENDO SYSTEM WATER BOTTLE & TUBING W/CO2 FILTER 00711549

## (undated) DEVICE — GLOVE PROTEXIS MICRO 6.0  2D73PM60

## (undated) DEVICE — GLOVE ESTEEM POWDER FREE SMT 7.5  2D72PT75

## (undated) DEVICE — DRAPE IOBAN INCISE 23X17" 6650EZ

## (undated) DEVICE — Device

## (undated) DEVICE — ESU LIGASURE MARYLAND LAPAROSCOPIC SLR/DVDR 5MMX37CM LF1937

## (undated) DEVICE — ADH SKIN CLOSURE PREMIERPRO EXOFIN 1.0ML 3470

## (undated) DEVICE — DECANTER VIAL 2006S

## (undated) DEVICE — ENDO FORCEP BX CAPTURA PRO SPIKE G50696

## (undated) DEVICE — SYR 03ML LL W/O NDL 309657

## (undated) DEVICE — ESU PENCIL W/COATED BLADE E2450H

## (undated) DEVICE — SU SILK 2-0 SH 30" K833H

## (undated) DEVICE — SOL WATER IRRIG 500ML BOTTLE 2F7113

## (undated) DEVICE — GLOVE BIOGEL PI ULTRATOUCH G SZ 7.0 42170

## (undated) DEVICE — TUBING SUCTION 12"X1/4" N612

## (undated) DEVICE — SPONGE RAY-TEC 4X8" 7318

## (undated) DEVICE — ENDO TROCAR FIRST ENTRY KII FIOS Z-THRD 12X100MM CTF73

## (undated) DEVICE — DRSG PRIMAPORE 02X3" 7133

## (undated) DEVICE — SU VICRYL 2-0 SH 27" UND J417H

## (undated) DEVICE — SUCTION MANIFOLD NEPTUNE 2 SYS 1 PORT 702-025-000

## (undated) DEVICE — DRAIN JACKSON PRATT ROUND SIL 19FR W/TROCAR LF JP-2232

## (undated) DEVICE — KIT CONNECTOR FOR OLYMPUS ENDOSCOPES DEFENDO 100310

## (undated) DEVICE — NDL 18GAX1.5" 305185

## (undated) RX ORDER — LIDOCAINE HYDROCHLORIDE 10 MG/ML
INJECTION, SOLUTION EPIDURAL; INFILTRATION; INTRACAUDAL; PERINEURAL
Status: DISPENSED
Start: 2019-02-28

## (undated) RX ORDER — REGADENOSON 0.08 MG/ML
INJECTION, SOLUTION INTRAVENOUS
Status: DISPENSED
Start: 2019-09-27

## (undated) RX ORDER — HYDROMORPHONE HYDROCHLORIDE 1 MG/ML
INJECTION, SOLUTION INTRAMUSCULAR; INTRAVENOUS; SUBCUTANEOUS
Status: DISPENSED
Start: 2020-02-10

## (undated) RX ORDER — BUPIVACAINE HYDROCHLORIDE 2.5 MG/ML
INJECTION, SOLUTION EPIDURAL; INFILTRATION; INTRACAUDAL
Status: DISPENSED
Start: 2020-02-10

## (undated) RX ORDER — BUPIVACAINE HYDROCHLORIDE 2.5 MG/ML
INJECTION, SOLUTION EPIDURAL; INFILTRATION; INTRACAUDAL
Status: DISPENSED
Start: 2018-01-08

## (undated) RX ORDER — FENTANYL CITRATE 50 UG/ML
INJECTION, SOLUTION INTRAMUSCULAR; INTRAVENOUS
Status: DISPENSED
Start: 2020-02-10

## (undated) RX ORDER — LIDOCAINE HYDROCHLORIDE 10 MG/ML
INJECTION, SOLUTION EPIDURAL; INFILTRATION; INTRACAUDAL; PERINEURAL
Status: DISPENSED
Start: 2018-10-04

## (undated) RX ORDER — BUPIVACAINE HYDROCHLORIDE 5 MG/ML
INJECTION, SOLUTION EPIDURAL; INTRACAUDAL; PERINEURAL
Status: DISPENSED
Start: 2025-06-25

## (undated) RX ORDER — BUPIVACAINE HYDROCHLORIDE 5 MG/ML
INJECTION, SOLUTION EPIDURAL; INTRACAUDAL; PERINEURAL
Status: DISPENSED
Start: 2025-04-30

## (undated) RX ORDER — SODIUM CHLORIDE 9 MG/ML
INJECTION, SOLUTION INTRAVENOUS
Status: DISPENSED
Start: 2018-06-29

## (undated) RX ORDER — SODIUM CHLORIDE 9 MG/ML
INJECTION, SOLUTION INTRAVENOUS
Status: DISPENSED
Start: 2017-10-20

## (undated) RX ORDER — SODIUM CHLORIDE 9 MG/ML
INJECTION, SOLUTION INTRAVENOUS
Status: DISPENSED
Start: 2019-02-28

## (undated) RX ORDER — DIAZEPAM 5 MG
TABLET ORAL
Status: DISPENSED
Start: 2022-10-27

## (undated) RX ORDER — HYDRALAZINE HYDROCHLORIDE 20 MG/ML
INJECTION INTRAMUSCULAR; INTRAVENOUS
Status: DISPENSED
Start: 2020-02-10

## (undated) RX ORDER — FENTANYL CITRATE 50 UG/ML
INJECTION, SOLUTION INTRAMUSCULAR; INTRAVENOUS
Status: DISPENSED
Start: 2018-08-03

## (undated) RX ORDER — OXYCODONE AND ACETAMINOPHEN 5; 325 MG/1; MG/1
TABLET ORAL
Status: DISPENSED
Start: 2017-10-20

## (undated) RX ORDER — PROPOFOL 10 MG/ML
INJECTION, EMULSION INTRAVENOUS
Status: DISPENSED
Start: 2020-02-10

## (undated) RX ORDER — DEXAMETHASONE SODIUM PHOSPHATE 4 MG/ML
INJECTION, SOLUTION INTRA-ARTICULAR; INTRALESIONAL; INTRAMUSCULAR; INTRAVENOUS; SOFT TISSUE
Status: DISPENSED
Start: 2020-02-10

## (undated) RX ORDER — ATROPINE SULFATE 0.4 MG/ML
AMPUL (ML) INJECTION
Status: DISPENSED
Start: 2020-02-10

## (undated) RX ORDER — SIMETHICONE 20 MG/.3ML
EMULSION ORAL
Status: DISPENSED
Start: 2018-10-22

## (undated) RX ORDER — DIAZEPAM 5 MG
TABLET ORAL
Status: DISPENSED
Start: 2023-11-30

## (undated) RX ORDER — DIPHENHYDRAMINE HYDROCHLORIDE 50 MG/ML
INJECTION INTRAMUSCULAR; INTRAVENOUS
Status: DISPENSED
Start: 2018-12-17

## (undated) RX ORDER — LIDOCAINE HYDROCHLORIDE 20 MG/ML
INJECTION, SOLUTION EPIDURAL; INFILTRATION; INTRACAUDAL; PERINEURAL
Status: DISPENSED
Start: 2020-02-10

## (undated) RX ORDER — DEXAMETHASONE SODIUM PHOSPHATE 4 MG/ML
INJECTION, SOLUTION INTRA-ARTICULAR; INTRALESIONAL; INTRAMUSCULAR; INTRAVENOUS; SOFT TISSUE
Status: DISPENSED
Start: 2018-01-08

## (undated) RX ORDER — LIDOCAINE HYDROCHLORIDE 10 MG/ML
INJECTION, SOLUTION EPIDURAL; INFILTRATION; INTRACAUDAL; PERINEURAL
Status: DISPENSED
Start: 2020-01-10

## (undated) RX ORDER — PHENYLEPHRINE HCL IN 0.9% NACL 1 MG/10 ML
SYRINGE (ML) INTRAVENOUS
Status: DISPENSED
Start: 2020-02-10

## (undated) RX ORDER — GLYCOPYRROLATE 0.2 MG/ML
INJECTION, SOLUTION INTRAMUSCULAR; INTRAVENOUS
Status: DISPENSED
Start: 2020-02-10

## (undated) RX ORDER — DIPHENHYDRAMINE HYDROCHLORIDE 50 MG/ML
INJECTION INTRAMUSCULAR; INTRAVENOUS
Status: DISPENSED
Start: 2018-08-03

## (undated) RX ORDER — ONDANSETRON 2 MG/ML
INJECTION INTRAMUSCULAR; INTRAVENOUS
Status: DISPENSED
Start: 2020-02-10

## (undated) RX ORDER — CEFAZOLIN SODIUM 1 G/3ML
INJECTION, POWDER, FOR SOLUTION INTRAMUSCULAR; INTRAVENOUS
Status: DISPENSED
Start: 2020-02-10

## (undated) RX ORDER — SIMETHICONE 20 MG/.3ML
EMULSION ORAL
Status: DISPENSED
Start: 2018-08-03

## (undated) RX ORDER — BUPIVACAINE HYDROCHLORIDE 5 MG/ML
INJECTION, SOLUTION EPIDURAL; INTRACAUDAL
Status: DISPENSED
Start: 2018-01-08

## (undated) RX ORDER — SIMETHICONE 20 MG/.3ML
EMULSION ORAL
Status: DISPENSED
Start: 2018-12-17

## (undated) RX ORDER — DIPHENHYDRAMINE HYDROCHLORIDE 50 MG/ML
INJECTION INTRAMUSCULAR; INTRAVENOUS
Status: DISPENSED
Start: 2018-10-22

## (undated) RX ORDER — DEXTROSE MONOHYDRATE, SODIUM CHLORIDE, AND POTASSIUM CHLORIDE 50; 1.49; 4.5 G/1000ML; G/1000ML; G/1000ML
INJECTION, SOLUTION INTRAVENOUS
Status: DISPENSED
Start: 2020-02-10

## (undated) RX ORDER — FENTANYL CITRATE 50 UG/ML
INJECTION, SOLUTION INTRAMUSCULAR; INTRAVENOUS
Status: DISPENSED
Start: 2018-12-17

## (undated) RX ORDER — LIDOCAINE HYDROCHLORIDE 10 MG/ML
INJECTION, SOLUTION EPIDURAL; INFILTRATION; INTRACAUDAL; PERINEURAL
Status: DISPENSED
Start: 2019-09-13

## (undated) RX ORDER — DIAZEPAM 5 MG
TABLET ORAL
Status: DISPENSED
Start: 2023-06-08

## (undated) RX ORDER — CEFAZOLIN SODIUM 2 G/100ML
INJECTION, SOLUTION INTRAVENOUS
Status: DISPENSED
Start: 2020-02-10

## (undated) RX ORDER — LIDOCAINE HYDROCHLORIDE AND EPINEPHRINE 10; 10 MG/ML; UG/ML
INJECTION, SOLUTION INFILTRATION; PERINEURAL
Status: DISPENSED
Start: 2018-06-29

## (undated) RX ORDER — CEFAZOLIN SODIUM 2 G/100ML
INJECTION, SOLUTION INTRAVENOUS
Status: DISPENSED
Start: 2018-06-29

## (undated) RX ORDER — EPHEDRINE SULFATE 50 MG/ML
INJECTION, SOLUTION INTRAMUSCULAR; INTRAVENOUS; SUBCUTANEOUS
Status: DISPENSED
Start: 2020-02-10

## (undated) RX ORDER — ONDANSETRON 2 MG/ML
INJECTION INTRAMUSCULAR; INTRAVENOUS
Status: DISPENSED
Start: 2018-12-17

## (undated) RX ORDER — LIDOCAINE HYDROCHLORIDE 10 MG/ML
INJECTION, SOLUTION EPIDURAL; INFILTRATION; INTRACAUDAL; PERINEURAL
Status: DISPENSED
Start: 2017-07-26

## (undated) RX ORDER — FENTANYL CITRATE 50 UG/ML
INJECTION, SOLUTION INTRAMUSCULAR; INTRAVENOUS
Status: DISPENSED
Start: 2018-10-22

## (undated) RX ORDER — LABETALOL 20 MG/4 ML (5 MG/ML) INTRAVENOUS SYRINGE
Status: DISPENSED
Start: 2020-02-10